# Patient Record
Sex: FEMALE | Race: WHITE | NOT HISPANIC OR LATINO | Employment: OTHER | URBAN - METROPOLITAN AREA
[De-identification: names, ages, dates, MRNs, and addresses within clinical notes are randomized per-mention and may not be internally consistent; named-entity substitution may affect disease eponyms.]

---

## 2017-01-01 ENCOUNTER — HOSPITAL ENCOUNTER (EMERGENCY)
Facility: HOSPITAL | Age: 82
Discharge: HOME | End: 2017-01-01
Attending: SPECIALIST
Payer: MEDICARE

## 2017-01-01 VITALS
TEMPERATURE: 98 F | DIASTOLIC BLOOD PRESSURE: 71 MMHG | WEIGHT: 180 LBS | BODY MASS INDEX: 30.73 KG/M2 | HEART RATE: 70 BPM | HEIGHT: 64 IN | OXYGEN SATURATION: 97 % | RESPIRATION RATE: 20 BRPM | SYSTOLIC BLOOD PRESSURE: 154 MMHG

## 2017-01-01 DIAGNOSIS — R00.2 PALPITATIONS: ICD-10-CM

## 2017-01-01 DIAGNOSIS — R91.8 PULMONARY INFILTRATE IN RIGHT LUNG ON CHEST X-RAY: Primary | ICD-10-CM

## 2017-01-01 LAB
ALBUMIN SERPL BCP-MCNC: 4.1 G/DL
ALP SERPL-CCNC: 61 U/L
ALT SERPL W/O P-5'-P-CCNC: 16 U/L
ANION GAP SERPL CALC-SCNC: 14 MMOL/L
APTT BLDCRRT: 28.2 SEC
AST SERPL-CCNC: 22 U/L
BASOPHILS # BLD AUTO: 0.02 K/UL
BASOPHILS NFR BLD: 0.2 %
BILIRUB SERPL-MCNC: 0.5 MG/DL
BILIRUB UR QL STRIP: NEGATIVE
BNP SERPL-MCNC: 99 PG/ML
BUN SERPL-MCNC: 32 MG/DL
CALCIUM SERPL-MCNC: 9.6 MG/DL
CHLORIDE SERPL-SCNC: 103 MMOL/L
CK SERPL-CCNC: 71 U/L
CLARITY UR: CLEAR
CO2 SERPL-SCNC: 22 MMOL/L
COLOR UR: YELLOW
CREAT SERPL-MCNC: 1.7 MG/DL
D DIMER PPP IA.FEU-MCNC: 0.98 MG/L FEU
DIFFERENTIAL METHOD: ABNORMAL
EOSINOPHIL # BLD AUTO: 0.2 K/UL
EOSINOPHIL NFR BLD: 1.9 %
ERYTHROCYTE [DISTWIDTH] IN BLOOD BY AUTOMATED COUNT: 13 %
EST. GFR  (AFRICAN AMERICAN): 31 ML/MIN/1.73 M^2
EST. GFR  (NON AFRICAN AMERICAN): 27 ML/MIN/1.73 M^2
GLUCOSE SERPL-MCNC: 92 MG/DL
GLUCOSE UR QL STRIP: NEGATIVE
HCT VFR BLD AUTO: 37.5 %
HGB BLD-MCNC: 12.9 G/DL
HGB UR QL STRIP: ABNORMAL
INR PPP: 1
KETONES UR QL STRIP: NEGATIVE
LEUKOCYTE ESTERASE UR QL STRIP: NEGATIVE
LYMPHOCYTES # BLD AUTO: 2.7 K/UL
LYMPHOCYTES NFR BLD: 32.5 %
MAGNESIUM SERPL-MCNC: 2.6 MG/DL
MCH RBC QN AUTO: 32.5 PG
MCHC RBC AUTO-ENTMCNC: 34.4 %
MCV RBC AUTO: 95 FL
MONOCYTES # BLD AUTO: 1 K/UL
MONOCYTES NFR BLD: 12.2 %
NEUTROPHILS # BLD AUTO: 4.4 K/UL
NEUTROPHILS NFR BLD: 53.2 %
NITRITE UR QL STRIP: NEGATIVE
PH UR STRIP: 6 [PH] (ref 5–8)
PLATELET # BLD AUTO: 239 K/UL
PMV BLD AUTO: 9.4 FL
POTASSIUM SERPL-SCNC: 4 MMOL/L
PROT SERPL-MCNC: 7.6 G/DL
PROT UR QL STRIP: NEGATIVE
PROTHROMBIN TIME: 10.6 SEC
RBC # BLD AUTO: 3.97 M/UL
SODIUM SERPL-SCNC: 139 MMOL/L
SP GR UR STRIP: 1.01 (ref 1–1.03)
TROPONIN I SERPL DL<=0.01 NG/ML-MCNC: 0.03 NG/ML
URN SPEC COLLECT METH UR: ABNORMAL
UROBILINOGEN UR STRIP-ACNC: NEGATIVE EU/DL
WBC # BLD AUTO: 8.25 K/UL

## 2017-01-01 PROCEDURE — 82550 ASSAY OF CK (CPK): CPT

## 2017-01-01 PROCEDURE — 85610 PROTHROMBIN TIME: CPT

## 2017-01-01 PROCEDURE — 80053 COMPREHEN METABOLIC PANEL: CPT

## 2017-01-01 PROCEDURE — 93010 ELECTROCARDIOGRAM REPORT: CPT | Mod: ,,, | Performed by: INTERNAL MEDICINE

## 2017-01-01 PROCEDURE — 85379 FIBRIN DEGRADATION QUANT: CPT

## 2017-01-01 PROCEDURE — 96361 HYDRATE IV INFUSION ADD-ON: CPT

## 2017-01-01 PROCEDURE — 81003 URINALYSIS AUTO W/O SCOPE: CPT

## 2017-01-01 PROCEDURE — 83735 ASSAY OF MAGNESIUM: CPT

## 2017-01-01 PROCEDURE — 83880 ASSAY OF NATRIURETIC PEPTIDE: CPT

## 2017-01-01 PROCEDURE — 25000003 PHARM REV CODE 250: Performed by: EMERGENCY MEDICINE

## 2017-01-01 PROCEDURE — 85730 THROMBOPLASTIN TIME PARTIAL: CPT

## 2017-01-01 PROCEDURE — 84484 ASSAY OF TROPONIN QUANT: CPT

## 2017-01-01 PROCEDURE — 87040 BLOOD CULTURE FOR BACTERIA: CPT | Mod: 59

## 2017-01-01 PROCEDURE — 63600175 PHARM REV CODE 636 W HCPCS: Performed by: SPECIALIST

## 2017-01-01 PROCEDURE — 25000003 PHARM REV CODE 250: Performed by: SPECIALIST

## 2017-01-01 PROCEDURE — 85025 COMPLETE CBC W/AUTO DIFF WBC: CPT

## 2017-01-01 PROCEDURE — 99284 EMERGENCY DEPT VISIT MOD MDM: CPT | Mod: 25

## 2017-01-01 PROCEDURE — 96365 THER/PROPH/DIAG IV INF INIT: CPT | Mod: 59

## 2017-01-01 RX ORDER — DOXYCYCLINE 100 MG/1
100 CAPSULE ORAL 2 TIMES DAILY
Qty: 20 CAPSULE | Refills: 0 | Status: SHIPPED | OUTPATIENT
Start: 2017-01-01 | End: 2017-01-09 | Stop reason: SINTOL

## 2017-01-01 RX ORDER — MOXIFLOXACIN HYDROCHLORIDE 400 MG/250ML
400 INJECTION, SOLUTION INTRAVENOUS
Status: DISCONTINUED | OUTPATIENT
Start: 2017-01-01 | End: 2017-01-01 | Stop reason: HOSPADM

## 2017-01-01 RX ORDER — DIPHENHYDRAMINE HCL 25 MG
25 CAPSULE ORAL
Status: COMPLETED | OUTPATIENT
Start: 2017-01-01 | End: 2017-01-01

## 2017-01-01 RX ORDER — DOXYCYCLINE HYCLATE 100 MG
100 TABLET ORAL
Status: COMPLETED | OUTPATIENT
Start: 2017-01-01 | End: 2017-01-01

## 2017-01-01 RX ADMIN — MOXIFLOXACIN HYDROCHLORIDE 400 MG: 400 INJECTION, SOLUTION INTRAVENOUS at 02:01

## 2017-01-01 RX ADMIN — SODIUM CHLORIDE 1000 ML: 0.9 INJECTION, SOLUTION INTRAVENOUS at 12:01

## 2017-01-01 RX ADMIN — DIPHENHYDRAMINE HYDROCHLORIDE 25 MG: 25 CAPSULE ORAL at 03:01

## 2017-01-01 RX ADMIN — DOXYCYCLINE HYCLATE 100 MG: 100 TABLET, COATED ORAL at 05:01

## 2017-01-01 NOTE — ED AVS SNAPSHOT
[facility]  [address] [facility] [address]               Shirley Metz   2017 11:27 AM   ED    Description:  Female : 12/3/1932   Department:  [facility]           Your Care was Coordinated By:     Provider Role From To    Mimi Miner MD Attending Provider 17 1128 17 1610    Wilbert Pate MD Attending Provider 17 1610 --      Reason for Visit     Hypertension           Diagnoses this Visit        Comments    Pulmonary infiltrate in right lung on chest x-ray    -  Primary     Palpitations           ED Disposition     None           To Do List           Follow-up Information     Follow up with Yandy Terrell MD. Call in 1 day.    Specialty:  Family Medicine    Contact information:    [address] [place] [address] [address]  [place] [address] [phone]          Follow up with [facility].    Specialty:  Emergency Medicine    Why:  If symptoms worsen    Contact information:    [id number] [place] [id number] [phone]        Follow up with Pulmonary. Call in 2 days.    Why:  for evaluation of Pulmionary infiltrate       These Medications        Disp Refills Start End    doxycycline (VIBRAMYCIN) 100 MG Cap 20 capsule 0 2017    Take 1 capsule (100 mg total) by mouth 2 (two) times daily. - Oral    Pharmacy: [facility] - [place], [place] - [address] Ph #: [phone]         [facility]     [facility] -  Assistance  Registered nurses in the [facility] provide clinical advisement, health education, appointment booking, and other advisory services.  Call for this free service at [phone].             Medications           Message regarding Medications     Verify the changes and/or additions to your medication regime listed below are the same as discussed with your  clinician today.  If any of these changes or additions are incorrect, please notify your healthcare provider.        START taking these NEW medications        Refills    doxycycline (VIBRAMYCIN) 100 MG Cap 0    Sig: Take 1 capsule (100 mg total) by mouth 2 (two) times daily.    Class: Normal    Route: Oral      These medications were administered today        Dose Freq    sodium chloride 0.9% bolus 1,000 mL 1,000 mL ED 1 Time    Sig: Inject 1,000 mLs into the vein ED 1 Time.    Class: Normal    Route: Intravenous    moxifloxacin 400 mg/250 mL IVPB 400 mg 400 mg Every 24 hours (non-standard times)    Sig: Inject 250 mLs (400 mg total) into the vein every 24 hours.    Class: Normal    Route: Intravenous    diphenhydrAMINE capsule 25 mg 25 mg ED 1 Time    Sig: Take 1 each (25 mg total) by mouth ED 1 Time.    Class: Normal    Route: Oral           Verify that the below list of medications is an accurate representation of the medications you are currently taking.  If none reported, the list may be blank. If incorrect, please contact your healthcare provider. Carry this list with you in case of emergency.           Current Medications     cloNIDine (CATAPRES) 0.1 MG tablet Take 1 tablet (0.1 mg total) by mouth 2 (two) times daily as needed.    diphenhydramine-acetaminophen (TYLENOL PM)  mg Tab Take 1 tablet by mouth nightly as needed.    doxycycline (VIBRAMYCIN) 100 MG Cap Take 1 capsule (100 mg total) by mouth 2 (two) times daily.    losartan-hydrochlorothiazide 50-12.5 mg (HYZAAR) 50-12.5 mg per tablet TAKE ONE TABLET BY MOUTH TWICE A DAY    loteprednol (LOTEMAX) 0.5 % ophthalmic suspension 1 drop once daily. One drop in the left eye and 2 drops in the right eye    metoprolol succinate (TOPROL-XL) 25 MG 24 hr tablet Take 2 tablets (50 mg total) by mouth every evening.    moxifloxacin 400 mg/250 mL IVPB 400 mg Inject 250 mLs (400 mg total) into the vein every 24 hours.           Clinical Reference Information     "       Your Vitals Were     BP Pulse Temp Resp Height Weight    154/71 70 98.4 °F (36.9 °C) (Oral) 20 5' 4" (1.626 m) 81.6 kg (180 lb)    SpO2 BMI             97% 30.9 kg/m2         Allergies as of 1/1/2017        Reactions    Claritin [Loratadine] Other (See Comments)    Double vision/spots    Hydrocodone-acetaminophen     wheezing    Naproxen     wheezing    Amlodipine     Myalgias    Fenofibrate     Causes muscle weakness    Hydralazine Analogues     Muscle tremors/aches    Lasix [Furosemide]     myalgias    Allegra [Fexofenadine] Other (See Comments)    Double vision/spots     Codeine Diarrhea, Other (See Comments)    Loss of balance     Erythromycin Other (See Comments)    Complete weakness/could not walk    Hydrocortisone (Bulk) Other (See Comments)    Only suppository/ caused muscle weakness    Patanol [Olopatadine] Other (See Comments)    Muscle weakness    Prednisone Anxiety    Statins-hmg-coa Reductase Inhibitors Other (See Comments)    Muscle weakness    Xalatan [Latanoprost] Other (See Comments)    Muscle weakness      Immunizations Administered on Date of Encounter - 1/1/2017     None      ED Micro, Lab, POCT     Start Ordered       Status Ordering Provider    01/01/17 1348 01/01/17 1348  Blood culture  Once      In process     01/01/17 1348 01/01/17 1348  Blood culture  Once      In process     01/01/17 1148 01/01/17 1148  CBC auto differential  Once      Final result     01/01/17 1148 01/01/17 1148  Comprehensive metabolic panel  Once      Final result     01/01/17 1148 01/01/17 1148  Magnesium  STAT      Final result     01/01/17 1148 01/01/17 1148  CK  STAT      Final result     01/01/17 1148 01/01/17 1148  Troponin I  STAT      Final result     01/01/17 1148 01/01/17 1148  Urinalysis  Once      Final result     01/01/17 1148 01/01/17 1148  Protime-INR  STAT      Final result     01/01/17 1148 01/01/17 1148  APTT  STAT      Final result     01/01/17 1148 01/01/17 1148  Brain natriuretic peptide  " STAT      Final result     01/01/17 1148 01/01/17 1148  D dimer, quantitative  STAT      Final result       ED Imaging Orders     Start Ordered       Status Ordering Provider    01/01/17 1451 01/01/17 1450  NM Lung Ventilation Perfusion Imaging  1 time imaging      Final result     01/01/17 1344 01/01/17 1343    1 time imaging,   Status:  Canceled      Canceled     01/01/17 1148 01/01/17 1148  X-Ray Chest PA And Lateral  1 time imaging      Final result         Discharge Instructions         Understanding Heart Palpitations    Heart palpitations are a symptom. Its the feeling you have when your heartbeat seems to be racing, pounding, skipping, or fluttering. Heart palpitations are most often felt in the chest. Sometimes, they may also be felt in the neck.  What causes heart palpitations?  In most cases, heart palpitations are caused by:  · Stress or anxiety  · Exercise  · Pregnancy  · Some medicines  · Caffeine  · Nicotine  · Alcohol  · Illegal drugs, such as cocaine  · Health problems, such as anemia or overactive thyroid  In some cases, heart palpitations may be caused by a problem with the heart. Abnormal heart rhythms (arrhythmias) are the main concern. They may need to be managed by you and your healthcare provider or treated right away.  How are heart palpitations treated?  Treatments for heart palpitations depend on the cause. Options may include:  · Managing the things that trigger your heart palpitations. This could mean:  ¨ Learning ways to reduce stress and anxiety  ¨ Avoiding caffeine, nicotine, alcohol, or illegal drugs  ¨ Stopping the use of certain medicines, under your doctors guidance  · Medicines, procedures, or surgery to treat an arrhythmia or other health problem that is causing your symptoms  What are the complications of heart palpitations?  Complications of heart palpitations are rare unless they are caused by a problem such as an arrhythmia. In such cases, complications can  include:  · Fainting  · Heart failure. This problem occurs when the heart is so weak it no longer pumps blood well.  · Blood clots and stroke  · Sudden cardiac arrest. This problem occurs when the heart suddenly stops beating.  When should I call my healthcare provider?  Call your healthcare provider right away if you have any of these:  · Fever of 100.4°F (38°C) or higher, or as directed  · Symptoms that dont get better with treatment, or symptoms that get worse  · New symptoms, such as chest pain, shortness of breath, dizziness, or fainting   © 3754-3623 [employer]. [address] [place] [address]. All rights reserved. This information is not intended as a substitute for professional medical care. Always follow your healthcare professional's instructions.          Discharge References/Attachments     ADULT, PNEUMONIA (ENGLISH)      Your Scheduled Appointments     Jan 04, 2017  9:00 AM CST   Nurse Visit with PRIMARY CARE NURSEKIM - Primary Care ([facility])    [address] [facility] [address]   [phone]            Jan 09, 2017  9:20 AM CST   Established Patient Visit with Yandy Terrell MD   [facility] - Primary Care ([facility]    [address] [facility] [address]   [phone]            Feb 06, 2017 10:30 AM CST   Established Patient Visit with Noemi Chery MD   [facility]ology ([facility]    [address] [facility] [address]   [phone]               [facility]  complies with applicable Federal civil rights laws and does not discriminate on the basis of race, color, national origin, age, disability, or sex.        Language Assistance Services     ATTENTION: Language assistance services are available, free of charge. Please call [phone].      ATENCIÓN: Si habla viviane, tiene a wylie disposición servicios gratuitos de asistencia lingüística. Llame al [phone].     CHÚ Ý: N?u b?n nói Ti?ng Vi?t,  có các d?ch v? h? tr? zia ng? mi?n phí dành cho b?n. G?i s? [phone].

## 2017-01-01 NOTE — DISCHARGE INSTRUCTIONS
Understanding Heart Palpitations    Heart palpitations are a symptom. Its the feeling you have when your heartbeat seems to be racing, pounding, skipping, or fluttering. Heart palpitations are most often felt in the chest. Sometimes, they may also be felt in the neck.  What causes heart palpitations?  In most cases, heart palpitations are caused by:  · Stress or anxiety  · Exercise  · Pregnancy  · Some medicines  · Caffeine  · Nicotine  · Alcohol  · Illegal drugs, such as cocaine  · Health problems, such as anemia or overactive thyroid  In some cases, heart palpitations may be caused by a problem with the heart. Abnormal heart rhythms (arrhythmias) are the main concern. They may need to be managed by you and your healthcare provider or treated right away.  How are heart palpitations treated?  Treatments for heart palpitations depend on the cause. Options may include:  · Managing the things that trigger your heart palpitations. This could mean:  ¨ Learning ways to reduce stress and anxiety  ¨ Avoiding caffeine, nicotine, alcohol, or illegal drugs  ¨ Stopping the use of certain medicines, under your doctors guidance  · Medicines, procedures, or surgery to treat an arrhythmia or other health problem that is causing your symptoms  What are the complications of heart palpitations?  Complications of heart palpitations are rare unless they are caused by a problem such as an arrhythmia. In such cases, complications can include:  · Fainting  · Heart failure. This problem occurs when the heart is so weak it no longer pumps blood well.  · Blood clots and stroke  · Sudden cardiac arrest. This problem occurs when the heart suddenly stops beating.  When should I call my healthcare provider?  Call your healthcare provider right away if you have any of these:  · Fever of 100.4°F (38°C) or higher, or as directed  · Symptoms that dont get better with treatment, or symptoms that get worse  · New symptoms, such as chest pain,  shortness of breath, dizziness, or fainting   © 7903-1517 The "[employer]. [address] [place] [address], [place] [address]. All rights reserved. This information is not intended as a substitute for professional medical care. Always follow your healthcare professional's instructions.

## 2017-01-01 NOTE — ED NOTES
Pt resting quietly in bed without distress. No new complaints. Pt AAOx3. resp E/U. Skin W/D. Bed low and locked. Will cont to monitor.

## 2017-01-01 NOTE — ED NOTES
Assumed care of pt at this time. Pt resting in ER stretcher, aaox4, rr e/u, NAD noted. Pt called to nurse's station c/o redness and itching to L arm proximal with to site of avelox infusion. Infusion stopped at this time, Dr. Miner notified and new orders placed. Pt denies SOB. Bed low and locked, call light in reach, side rails up x2. Cardiac monitor and fall precautions in place. Vss noted. Pt verbalized understanding of status and POC; denies further needs, will continue to monitor.

## 2017-01-01 NOTE — ED NOTES
Pt c/o of tachycardia and hypertension on and off since Christmas. Pt seen at Clinic on Monday and was told to follow up with PCP. Pt AAOX3. Resp E/U. Skin W/D. Will cont to monitor.

## 2017-01-01 NOTE — ED NOTES
Patient placed on continuous cardiac monitor, automatic blood pressure cuff and continuous pulse oximeter.

## 2017-01-01 NOTE — ED PROVIDER NOTES
SCRIBE #1 NOTE: I, Vijaya Jacobson, am scribing for, and in the presence of, Mimi Miner MD. I have scribed the HPI, ROS, and PEx.     SCRIBE #2 NOTE: I, Lacie Garcia, am scribing for, and in the presence of,  Wilbert Pate MD. I have scribed the remaining portions of the note not scribed by Scribe #1.     History      Chief Complaint   Patient presents with    Hypertension     my BP and pulse have been going up and down, seen at clinic monday       Review of patient's allergies indicates:   Allergen Reactions    Claritin [loratadine] Other (See Comments)     Double vision/spots    Hydrocodone-acetaminophen      wheezing    Naproxen      wheezing    Amlodipine      Myalgias      Fenofibrate      Causes muscle weakness    Hydralazine analogues      Muscle tremors/aches      Lasix [furosemide]      myalgias    Allegra [fexofenadine] Other (See Comments)     Double vision/spots     Codeine Diarrhea and Other (See Comments)     Loss of balance     Erythromycin Other (See Comments)     Complete weakness/could not walk    Hydrocortisone (bulk) Other (See Comments)     Only suppository/ caused muscle weakness    Patanol [olopatadine] Other (See Comments)     Muscle weakness    Prednisone Anxiety    Statins-hmg-coa reductase inhibitors Other (See Comments)     Muscle weakness    Xalatan [latanoprost] Other (See Comments)     Muscle weakness        HPI   HPI    1/1/2017, 11:31 AM   History obtained from the patient      History of Present Illness: Shirley Metz is a 84 y.o. female patient who presents to the Emergency Department for palpitations which onset gradually 1 week ago. Sxs are episodic, described as a fast heart beat, and moderate in severity. Pt was seen in clinic on Monday for these sxs. There are no  mitigating or exacerbating factors noted. Associated sxs include fluctuating blood pressure and SOB.  Pt denies any fever, N/V/D, CP, chest tightness, calf pain, cough,  and all other sxs at this time. No further complaints or concerns at this time.     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 Quintanilla    Eye surgery       Cataract wIOL    Breast cyst excision Left 1997 approx    Tonsillectomy  1938    Adenoidectomy  1938    Cholecystectomy  1975         Family History:  Family History   Problem Relation Age of Onset    Heart disease Mother     Hypertension Mother     Cancer Father 88     sarcoma( ?Kaposi's ) on leg    Deep vein thrombosis Neg Hx     Ovarian cancer Neg Hx     Breast cancer Neg Hx     Kidney disease Neg Hx     Strabismus Neg Hx     Retinal detachment Neg Hx     Macular degeneration Neg Hx     Glaucoma Neg Hx     Blindness Neg Hx     Amblyopia Neg Hx     Eczema Neg Hx     Lupus Neg Hx     Melanoma Neg Hx     Psoriasis Neg Hx     Diabetes Neg Hx     Stroke Neg Hx     Mental retardation Neg Hx     Mental illness Neg Hx     Hyperlipidemia Neg Hx     COPD Neg Hx     Asthma Neg Hx     Depression Neg Hx     Alcohol abuse Neg Hx     Drug abuse Neg Hx        Social History:  Social History     Social History Main Topics    Smoking status: Never Smoker    Smokeless tobacco: Never Used      Comment: history of passive smoking  from her ex-    Alcohol use 1.2 oz/week     2 Standard drinks or equivalent per week      Comment: 2 scotch drinks a week.     Drug use: No    Sexual activity: Yes     Partners: Male      Comment: discussed protection for STD's       ROS   Review of Systems   Constitutional: Negative for fever.   HENT: Negative for sore throat.    Respiratory: Negative for cough, chest tightness and shortness of breath.    Cardiovascular: Positive for palpitations. Negative for chest pain.   Gastrointestinal: Negative for diarrhea, nausea and vomiting.   Genitourinary: Negative for dysuria.   Musculoskeletal: Negative for back pain.        (-) calf pain   Skin: Negative for rash.   Neurological: Negative for weakness.   Hematological: Does not bruise/bleed easily.       Physical Exam    Initial Vitals   BP Pulse Resp Temp SpO2   01/01/17 1125 01/01/17 1125 01/01/17 1125 01/01/17 1125 01/01/17 1125   146/69 69 18 98.4 °F (36.9 °C) 93 %      Physical Exam  Nursing Notes and Vital Signs Reviewed.  Constitutional: Patient is in no acute distress. Awake and alert. Well-developed and well-nourished.  Head: Atraumatic. Normocephalic.  Eyes: PERRL. EOM intact. Conjunctivae are not pale. No scleral icterus.  ENT: Mucous membranes are moist. Oropharynx is clear and symmetric.    Neck: Supple. Full ROM. No lymphadenopathy.  Cardiovascular: Regular rate. Regular rhythm. No murmurs, rubs, or gallops. Distal pulses are 2+ and symmetric.  Pulmonary/Chest: No respiratory distress. Clear to auscultation bilaterally. No wheezing, rales, or rhonchi.  Abdominal: Soft and non-distended.  There is no tenderness.  No rebound, guarding, or rigidity.    Genitourinary: No CVA tenderness  Musculoskeletal: Moves all extremities. No obvious deformities. No edema. No calf tenderness.  Skin: Warm and dry.  Neurological:  Alert, awake, and appropriate.  Normal speech.  No acute focal neurological deficits are appreciated.  Psychiatric: Normal affect.  "Good eye contact. Appropriate in content.    ED Course    Procedures  ED Vital Signs:  Vitals:    01/01/17 1125 01/01/17 1223 01/01/17 1403 01/01/17 1440   BP: (!) 146/69 131/83 (!) 161/59 (!) 160/64   Pulse: 69 69 73 75   Resp: 18 16 (!) 21 (!) 21   Temp: 98.4 °F (36.9 °C)      TempSrc: Oral      SpO2: (!) 93% 97% 98% 98%   Weight: 81.6 kg (180 lb)      Height: 5' 4" (1.626 m)       01/01/17 1502   BP: (!) 154/71   Pulse: 70   Resp: 20   Temp:    TempSrc:    SpO2: 97%   Weight:    Height:        Abnormal Lab Results:  Labs Reviewed   CBC W/ AUTO DIFFERENTIAL - Abnormal; Notable for the following:        Result Value    RBC 3.97 (*)     MCH 32.5 (*)     All other components within normal limits   COMPREHENSIVE METABOLIC PANEL - Abnormal; Notable for the following:     CO2 22 (*)     BUN, Bld 32 (*)     Creatinine 1.7 (*)     eGFR if  31 (*)     eGFR if non  27 (*)     All other components within normal limits   URINALYSIS - Abnormal; Notable for the following:     Occult Blood UA Trace (*)     All other components within normal limits   D DIMER, QUANTITATIVE - Abnormal; Notable for the following:     D-Dimer 0.98 (*)     All other components within normal limits   CULTURE, BLOOD   CULTURE, BLOOD   MAGNESIUM   CK   TROPONIN I   PROTIME-INR   APTT   B-TYPE NATRIURETIC PEPTIDE        All Lab Results:  Results for orders placed or performed during the hospital encounter of 01/01/17   CBC auto differential   Result Value Ref Range    WBC 8.25 3.90 - 12.70 K/uL    RBC 3.97 (L) 4.00 - 5.40 M/uL    Hemoglobin 12.9 12.0 - 16.0 g/dL    Hematocrit 37.5 37.0 - 48.5 %    MCV 95 82 - 98 fL    MCH 32.5 (H) 27.0 - 31.0 pg    MCHC 34.4 32.0 - 36.0 %    RDW 13.0 11.5 - 14.5 %    Platelets 239 150 - 350 K/uL    MPV 9.4 9.2 - 12.9 fL    Gran # 4.4 1.8 - 7.7 K/uL    Lymph # 2.7 1.0 - 4.8 K/uL    Mono # 1.0 0.3 - 1.0 K/uL    Eos # 0.2 0.0 - 0.5 K/uL    Baso # 0.02 0.00 - 0.20 K/uL    Gran% 53.2 38.0 - 73.0 " %    Lymph% 32.5 18.0 - 48.0 %    Mono% 12.2 4.0 - 15.0 %    Eosinophil% 1.9 0.0 - 8.0 %    Basophil% 0.2 0.0 - 1.9 %    Differential Method Automated    Comprehensive metabolic panel   Result Value Ref Range    Sodium 139 136 - 145 mmol/L    Potassium 4.0 3.5 - 5.1 mmol/L    Chloride 103 95 - 110 mmol/L    CO2 22 (L) 23 - 29 mmol/L    Glucose 92 70 - 110 mg/dL    BUN, Bld 32 (H) 8 - 23 mg/dL    Creatinine 1.7 (H) 0.5 - 1.4 mg/dL    Calcium 9.6 8.7 - 10.5 mg/dL    Total Protein 7.6 6.0 - 8.4 g/dL    Albumin 4.1 3.5 - 5.2 g/dL    Total Bilirubin 0.5 0.1 - 1.0 mg/dL    Alkaline Phosphatase 61 55 - 135 U/L    AST 22 10 - 40 U/L    ALT 16 10 - 44 U/L    Anion Gap 14 8 - 16 mmol/L    eGFR if African American 31 (A) >60 mL/min/1.73 m^2    eGFR if non African American 27 (A) >60 mL/min/1.73 m^2   Magnesium   Result Value Ref Range    Magnesium 2.6 1.6 - 2.6 mg/dL   CK   Result Value Ref Range    CPK 71 20 - 180 U/L   Troponin I   Result Value Ref Range    Troponin I 0.025 0.000 - 0.026 ng/mL   Urinalysis   Result Value Ref Range    Specimen UA Urine, Clean Catch     Color, UA Yellow Yellow, Straw, Pallavi    Appearance, UA Clear Clear    pH, UA 6.0 5.0 - 8.0    Specific Gravity, UA 1.010 1.005 - 1.030    Protein, UA Negative Negative    Glucose, UA Negative Negative    Ketones, UA Negative Negative    Bilirubin (UA) Negative Negative    Occult Blood UA Trace (A) Negative    Nitrite, UA Negative Negative    Urobilinogen, UA Negative <2.0 EU/dL    Leukocytes, UA Negative Negative   Protime-INR   Result Value Ref Range    Prothrombin Time 10.6 9.0 - 12.5 sec    INR 1.0 0.8 - 1.2   APTT   Result Value Ref Range    aPTT 28.2 21.0 - 32.0 sec   Brain natriuretic peptide   Result Value Ref Range    BNP 99 0 - 99 pg/mL   D dimer, quantitative   Result Value Ref Range    D-Dimer 0.98 (H) <0.50 mg/L FEU         Imaging Results:  Imaging Results         NM Lung Ventilation Perfusion Imaging (Final result) Result time:  01/01/17 16:19:41     Final result by Bere Henson III, MD (01/01/17 16:19:41)    Impression:           Low probability for pulmonary embolism.        Electronically signed by: BERE HENSON MD  Date:     01/01/17  Time:    16:19     Narrative:    NM LUNG VENTILATION AND PERFUSION IMAGING    Clinical History: Shortness of breath.    Findings:   The patient received 1 mCi of technetium 99 DTPA for ventilation images and 6 mCi of technetium 99 MAA for perfusion images.  There is a small fixed V/Q defect in the lateral right lower lobe seen on the posterior view.  There are no mismatched segmental or lobar perfusion defects to suggest PE.            X-Ray Chest PA And Lateral (Final result) Result time:  01/01/17 13:06:46    Final result by Amish Montemayor MD (01/01/17 13:06:46)    Impression:         Questionable infiltrate in medial right lung base.  New since 2 19,016.      Electronically signed by: AMISH MONTEMAYOR MD  Date:     01/01/17  Time:    13:06     Narrative:    Exam: XR CHEST PA AND LATERAL,    Date:  01/01/17 12:51:46    History: Hypertension.    Comparison:  02/09/2016        Findings:     Cardiac silhouette is enlarged.  Increased density medial right lung base could represent infiltrate or artifact due to a superimposition of densities.  No significant bony findings               The EKG was ordered, reviewed, and independently interpreted by the ED provider.  Interpretation time: 11:54 AM  Rate: 63 BPM  Rhythm: normal sinus rhythm  Interpretation: No STEMI.           The Emergency Provider reviewed the vital signs and test results, which are outlined above.    ED Discussion     1:45 PM: Re-evaluated pt. Pt is resting comfortably. D/w pt all pertinent results. D/w pt any concerns expressed at this time. Answered all questions. Pt expresses understanding at this time.    4:00 PM: Dr. Miner transfers care of pt to Dr. Pate, pending imaging and lab results.    4:40 PM: Reassessed pt at this time. Will treat  patient with Doxycycline antibiotics. Instructed pt to f/u with pulmonary for evaluation of abnormal chest X-ray.  Discussed with pt all pertinent ED information and results. Discussed pt dx and plan of tx. Gave pt all f/u and return to the ED instructions. All questions and concerns were addressed at this time. Pt expresses understanding of information and instructions, and is comfortable with plan to discharge. Pt is stable for discharge.    I discussed with patient and/or family/caretaker that evaluation in the ED does not suggest any emergent or life threatening medical conditions requiring immediate intervention beyond what was provided in the ED, and I believe patient is safe for discharge.  Regardless, an unremarkable evaluation in the ED does not preclude the development or presence of a serious of life threatening condition. As such, patient was instructed to return immediately for any worsening or change in current symptoms.      ED Medication(s):  Medications   moxifloxacin 400 mg/250 mL IVPB 400 mg (0 mg Intravenous Stopped 1/1/17 1512)   sodium chloride 0.9% bolus 1,000 mL (0 mLs Intravenous Stopped 1/1/17 1430)   diphenhydrAMINE capsule 25 mg (25 mg Oral Given 1/1/17 1520)       New Prescriptions    DOXYCYCLINE (VIBRAMYCIN) 100 MG CAP    Take 1 capsule (100 mg total) by mouth 2 (two) times daily.       Follow-up Information     Follow up with Yandy Terrell MD. Call in 1 day.    Specialty:  Family Medicine    Contact information:    [address] [place] [address] [phone]          Follow up with [facility] - .    Specialty:  Emergency Medicine    Why:  If symptoms worsen    Contact information:    [id number] [place] [address] [phone]        Follow up with Pulmonary. Call in 2 days.    Why:  for evaluation of Pulmionary infiltrate          Pre-hypertension/Hypertension: The pt has been informed that they may have pre-hypertension or  hypertension based on a blood pressure reading in the ED. I recommend that the pt call the PCP listed on their discharge instructions or a physician of their choice this week to arrange f/u for further evaluation of possible pre-hypertension or hypertension.     Medical Decision Making    Medical Decision Making:   Clinical Tests:   Lab Tests: Ordered and Reviewed  Radiological Study: Ordered and Reviewed  Medical Tests: Ordered and Reviewed           Scribe Attestation:   Scribe #1: I performed the above scribed service and the documentation accurately describes the services I performed. I attest to the accuracy of the note.    Attending:   Physician Attestation Statement for Scribe #1: I, Mimi Miner MD, personally performed the services described in this documentation, as scribed by Vijaya Jacobson, in my presence, and it is both accurate and complete.       Scribe Attestation:   Scribe #2: I performed the above scribed service and the documentation accurately describes the services I performed. I attest to the accuracy of the note.    Attending Attestation:           Physician Attestation for Scribe:    Physician Attestation Statement for Scribe #2: I, Wilbert Pate MD, reviewed documentation, as scribed by Lacie Garcia in my presence, and it is both accurate and complete. I also acknowledge and confirm the content of the note done by Drakeibfavian #1.          Clinical Impression       ICD-10-CM ICD-9-CM   1. Pulmonary infiltrate in right lung on chest x-ray R91.8 793.19   2. Palpitations R00.2 785.1       Disposition:   Disposition: Discharged  Condition: Stable         Wilbert Pate MD  01/01/17 1098

## 2017-01-01 NOTE — ED NOTES
Pt ambulatory to restroom and returned to ER stretcher without difficulty. Pt resting in ER stretcher, aaox4, rr e/u, NAD noted. Pt remains on cardiac monitor with vss noted. Bed low and locked, call light in reach, side rails up x2. Pt verbalized understanding of status and POC; denies further needs. Will continue to monitor.

## 2017-01-04 ENCOUNTER — CLINICAL SUPPORT (OUTPATIENT)
Dept: INTERNAL MEDICINE | Facility: CLINIC | Age: 82
End: 2017-01-04
Payer: MEDICARE

## 2017-01-04 ENCOUNTER — TELEPHONE (OUTPATIENT)
Dept: CARDIOLOGY | Facility: CLINIC | Age: 82
End: 2017-01-04

## 2017-01-04 ENCOUNTER — TELEPHONE (OUTPATIENT)
Dept: INTERNAL MEDICINE | Facility: CLINIC | Age: 82
End: 2017-01-04

## 2017-01-04 DIAGNOSIS — R00.2 PALPITATIONS: ICD-10-CM

## 2017-01-04 DIAGNOSIS — E78.2 MIXED HYPERLIPIDEMIA: ICD-10-CM

## 2017-01-04 DIAGNOSIS — I10 ESSENTIAL HYPERTENSION: ICD-10-CM

## 2017-01-04 DIAGNOSIS — R00.2 PALPITATIONS: Primary | ICD-10-CM

## 2017-01-04 DIAGNOSIS — I49.3 PVC'S (PREMATURE VENTRICULAR CONTRACTIONS): ICD-10-CM

## 2017-01-04 LAB
ALBUMIN SERPL BCP-MCNC: 4.2 G/DL
ALP SERPL-CCNC: 62 U/L
ALT SERPL W/O P-5'-P-CCNC: 17 U/L
ANION GAP SERPL CALC-SCNC: 10 MMOL/L
AST SERPL-CCNC: 24 U/L
BASOPHILS # BLD AUTO: 0.03 K/UL
BASOPHILS NFR BLD: 0.5 %
BILIRUB SERPL-MCNC: 0.6 MG/DL
BUN SERPL-MCNC: 26 MG/DL
CALCIUM SERPL-MCNC: 10 MG/DL
CHLORIDE SERPL-SCNC: 105 MMOL/L
CHOLEST/HDLC SERPL: 5.9 {RATIO}
CO2 SERPL-SCNC: 25 MMOL/L
CREAT SERPL-MCNC: 1.5 MG/DL
DIFFERENTIAL METHOD: ABNORMAL
EOSINOPHIL # BLD AUTO: 0.1 K/UL
EOSINOPHIL NFR BLD: 1.8 %
ERYTHROCYTE [DISTWIDTH] IN BLOOD BY AUTOMATED COUNT: 12.9 %
EST. GFR  (AFRICAN AMERICAN): 36.6 ML/MIN/1.73 M^2
EST. GFR  (NON AFRICAN AMERICAN): 31.8 ML/MIN/1.73 M^2
GLUCOSE SERPL-MCNC: 104 MG/DL
HCT VFR BLD AUTO: 37.7 %
HDL/CHOLESTEROL RATIO: 17.1 %
HDLC SERPL-MCNC: 258 MG/DL
HDLC SERPL-MCNC: 44 MG/DL
HGB BLD-MCNC: 12.8 G/DL
LDLC SERPL CALC-MCNC: 177.2 MG/DL
LYMPHOCYTES # BLD AUTO: 1.9 K/UL
LYMPHOCYTES NFR BLD: 28.5 %
MCH RBC QN AUTO: 32.8 PG
MCHC RBC AUTO-ENTMCNC: 34 %
MCV RBC AUTO: 97 FL
MONOCYTES # BLD AUTO: 0.6 K/UL
MONOCYTES NFR BLD: 9.4 %
NEUTROPHILS # BLD AUTO: 3.9 K/UL
NEUTROPHILS NFR BLD: 59.5 %
NONHDLC SERPL-MCNC: 214 MG/DL
PLATELET # BLD AUTO: 255 K/UL
PMV BLD AUTO: 10.1 FL
POTASSIUM SERPL-SCNC: 4.2 MMOL/L
PROT SERPL-MCNC: 7.8 G/DL
RBC # BLD AUTO: 3.9 M/UL
SODIUM SERPL-SCNC: 140 MMOL/L
TRIGL SERPL-MCNC: 184 MG/DL
TSH SERPL DL<=0.005 MIU/L-ACNC: 1.87 UIU/ML
WBC # BLD AUTO: 6.49 K/UL

## 2017-01-04 PROCEDURE — 99999 PR PBB SHADOW E&M-EST. PATIENT-LVL I: CPT | Mod: PBBFAC,,,

## 2017-01-04 PROCEDURE — 84443 ASSAY THYROID STIM HORMONE: CPT

## 2017-01-04 PROCEDURE — 80053 COMPREHEN METABOLIC PANEL: CPT

## 2017-01-04 PROCEDURE — 80061 LIPID PANEL: CPT

## 2017-01-04 PROCEDURE — 36415 COLL VENOUS BLD VENIPUNCTURE: CPT | Mod: S$GLB,,, | Performed by: FAMILY MEDICINE

## 2017-01-04 PROCEDURE — 85025 COMPLETE CBC W/AUTO DIFF WBC: CPT

## 2017-01-04 NOTE — TELEPHONE ENCOUNTER
Pt called and left VM on my machine for me to call her back  She says over maya her pulse was up over 100bpm  She says she saw urgent care and they did ekg and it didn't show anything  She says it was good all the next week then once the weekend came , her pulse was up again so she went to the ER.   She says she has been having dry mouth and out of breath.   They did labs and x rays and she had some questionable pneumonia.   They treated her for this but she says she is still having her heart race from time to time  She says it has happened twice this week.   I offered pt appt with NP this week and she declined  She says she is seeing pulmonary on Friday of this week then her PCP on Monday 1/9  Pt has scheduled with Arvind Metz NP-C on 1/13 as of now   She says she will call and move appt up if needed.

## 2017-01-04 NOTE — TELEPHONE ENCOUNTER
The patient came in today and had labs done,  Would you like for us to do the TSH that was ordered on 11/28/16???

## 2017-01-05 ENCOUNTER — TELEPHONE (OUTPATIENT)
Dept: CARDIOLOGY | Facility: HOSPITAL | Age: 82
End: 2017-01-05

## 2017-01-06 LAB
BACTERIA BLD CULT: NORMAL
BACTERIA BLD CULT: NORMAL

## 2017-01-09 ENCOUNTER — APPOINTMENT (OUTPATIENT)
Dept: RADIOLOGY | Facility: HOSPITAL | Age: 82
End: 2017-01-09
Attending: FAMILY MEDICINE
Payer: MEDICARE

## 2017-01-09 ENCOUNTER — OFFICE VISIT (OUTPATIENT)
Dept: INTERNAL MEDICINE | Facility: CLINIC | Age: 82
End: 2017-01-09
Payer: MEDICARE

## 2017-01-09 VITALS
BODY MASS INDEX: 31.22 KG/M2 | OXYGEN SATURATION: 95 % | SYSTOLIC BLOOD PRESSURE: 144 MMHG | DIASTOLIC BLOOD PRESSURE: 64 MMHG | HEIGHT: 64 IN | TEMPERATURE: 98 F | HEART RATE: 64 BPM | WEIGHT: 182.88 LBS

## 2017-01-09 DIAGNOSIS — I70.0 CALCIFICATION OF AORTA: ICD-10-CM

## 2017-01-09 DIAGNOSIS — I10 ESSENTIAL HYPERTENSION: ICD-10-CM

## 2017-01-09 DIAGNOSIS — J18.9 PNEUMONIA OF RIGHT LOWER LOBE DUE TO INFECTIOUS ORGANISM: Primary | ICD-10-CM

## 2017-01-09 DIAGNOSIS — J18.9 PNEUMONIA OF RIGHT LOWER LOBE DUE TO INFECTIOUS ORGANISM: ICD-10-CM

## 2017-01-09 DIAGNOSIS — N18.30 CKD (CHRONIC KIDNEY DISEASE) STAGE 3, GFR 30-59 ML/MIN: ICD-10-CM

## 2017-01-09 PROCEDURE — 3077F SYST BP >= 140 MM HG: CPT | Mod: S$GLB,,, | Performed by: FAMILY MEDICINE

## 2017-01-09 PROCEDURE — 71020 XR CHEST PA AND LATERAL: CPT | Mod: TC,PO

## 2017-01-09 PROCEDURE — 71020 XR CHEST PA AND LATERAL: CPT | Mod: 26,,, | Performed by: RADIOLOGY

## 2017-01-09 PROCEDURE — 99999 PR PBB SHADOW E&M-EST. PATIENT-LVL III: CPT | Mod: PBBFAC,,, | Performed by: FAMILY MEDICINE

## 2017-01-09 PROCEDURE — 3078F DIAST BP <80 MM HG: CPT | Mod: S$GLB,,, | Performed by: FAMILY MEDICINE

## 2017-01-09 PROCEDURE — 99499 UNLISTED E&M SERVICE: CPT | Mod: S$GLB,,, | Performed by: FAMILY MEDICINE

## 2017-01-09 PROCEDURE — 1159F MED LIST DOCD IN RCRD: CPT | Mod: S$GLB,,, | Performed by: FAMILY MEDICINE

## 2017-01-09 PROCEDURE — 1160F RVW MEDS BY RX/DR IN RCRD: CPT | Mod: S$GLB,,, | Performed by: FAMILY MEDICINE

## 2017-01-09 PROCEDURE — 1157F ADVNC CARE PLAN IN RCRD: CPT | Mod: S$GLB,,, | Performed by: FAMILY MEDICINE

## 2017-01-09 PROCEDURE — 1125F AMNT PAIN NOTED PAIN PRSNT: CPT | Mod: S$GLB,,, | Performed by: FAMILY MEDICINE

## 2017-01-09 PROCEDURE — 99213 OFFICE O/P EST LOW 20 MIN: CPT | Mod: S$GLB,,, | Performed by: FAMILY MEDICINE

## 2017-01-09 NOTE — PROGRESS NOTES
Subjective:       Patient ID: Shirley Metz is a 84 y.o. female.    Chief Complaint: discuss lab    HPI Comments: She was seen at ED 1/2/17 for SOB, HR elevation to 105. The CXR showed infiltrate. She had allergic rxn to moxifloxacin in the ED.     She had problems with doxy - had nausea, weakness, SOB. She stopped after 3 doses and felt her symptoms were better. After 36 hours she tried again but still felt extremely weak. She stopped again after 3 doses. She sts she has problems with many meds, usually involving weakness and muscle aches.    Review of Systems   Constitutional: Negative for fever.   Respiratory: Positive for shortness of breath. Negative for cough.    Cardiovascular: Negative for chest pain, palpitations and leg swelling.       Objective:      Physical Exam   Constitutional: She is oriented to person, place, and time. She appears well-developed and well-nourished.   HENT:   Head: Normocephalic and atraumatic.   Right Ear: Tympanic membrane, external ear and ear canal normal.   Left Ear: Tympanic membrane, external ear and ear canal normal.   Nose: Nose normal.   Mouth/Throat: Oropharynx is clear and moist. No oropharyngeal exudate.   Eyes: Conjunctivae and EOM are normal.   Neck: Neck supple. No thyromegaly present.   Cardiovascular: Normal rate, regular rhythm and normal heart sounds.    Pulmonary/Chest: Effort normal and breath sounds normal.   Lymphadenopathy:     She has no cervical adenopathy.   Neurological: She is alert and oriented to person, place, and time.   Skin: Skin is warm and dry.   Psychiatric: She has a normal mood and affect. Her behavior is normal.       Assessment:       1. Pneumonia of right lower lobe due to infectious organism    2. Calcification of aorta    3. Essential hypertension    4. CKD (chronic kidney disease) stage 3, GFR 30-59 ml/min        Plan:     1. Pneumonia of right lower lobe due to infectious organism  X-Ray Chest PA And Lateral   2.  Calcification of aorta     3. Essential hypertension  Basic metabolic panel   4. CKD (chronic kidney disease) stage 3, GFR 30-59 ml/min  Basic metabolic panel     Can rx bactrim if infiltrate present.

## 2017-01-09 NOTE — MR AVS SNAPSHOT
[address]  [place] [address]  Phone: [phone]  Fax: [phone]                  Shirley Bettencourt Isaías   2017 9:20 AM   Office Visit    Description:  Female : 12/3/1932   Provider:  Yandy Terrell MD   Department:  [facility]           Reason for Visit     discuss lab           Diagnoses this Visit        Comments    Pneumonia of right lower lobe due to infectious organism    -  Primary     Calcification of aorta         Essential hypertension         CKD (chronic kidney disease) stage 3, GFR 30-59 ml/min                To Do List           Future Appointments        Provider Department Dept Phone    2017 3:20 PM Rachel Nava NP [facility] Pulmonary Services [phone]    2017 9:00 AM [facility] - Cardiology [phone]    2017 10:30 AM Noemi Chery MD [facility] Rheumatology [phone]    2017 8:40 AM PRIMARY CARE NURSEKIM [facility] [phone]    4/10/2017 9:00 AM Yandy Terrell MD [facility] [phone]      Goals (5 Years of Data)     None      Follow-Up and Disposition     Return in about 3 months (around 2017), or if symptoms worsen or fail to improve.      [facility] On [facility]     [facility] On Call Nurse [facility] -  Assistance  Registered nurses in the [facility] provide clinical advisement, health education, appointment booking, and other advisory services.  Call for this free service at [phone].             Medications           Message regarding Medications     Verify the changes and/or additions to your medication regime listed below are the same as discussed with your clinician today.  If any of these changes or additions are incorrect, please notify your healthcare provider.        STOP taking these medications     doxycycline (VIBRAMYCIN) 100 MG Cap Take 1 capsule (100 mg total) by mouth 2 (two) times daily.     "       Verify that the below list of medications is an accurate representation of the medications you are currently taking.  If none reported, the list may be blank. If incorrect, please contact your healthcare provider. Carry this list with you in case of emergency.           Current Medications     cloNIDine (CATAPRES) 0.1 MG tablet Take 1 tablet (0.1 mg total) by mouth 2 (two) times daily as needed.    diphenhydramine-acetaminophen (TYLENOL PM)  mg Tab Take 1 tablet by mouth nightly as needed.    losartan-hydrochlorothiazide 50-12.5 mg (HYZAAR) 50-12.5 mg per tablet TAKE ONE TABLET BY MOUTH TWICE A DAY    loteprednol (LOTEMAX) 0.5 % ophthalmic suspension 1 drop once daily. One drop in the left eye and 1 drops in the right eye    metoprolol succinate (TOPROL-XL) 25 MG 24 hr tablet Take 2 tablets (50 mg total) by mouth every evening.           Clinical Reference Information           Vital Signs - Last Recorded  Most recent update: 1/9/2017  9:23 AM by Argentina Pruitt LPN    BP Pulse Temp Ht Wt SpO2    (!) 144/64 (BP Location: Right arm, Patient Position: Sitting, BP Method: Automatic) 64 98.1 °F (36.7 °C) (Oral) 5' 4" (1.626 m) 83 kg (182 lb 14 oz) 95%    BMI                31.39 kg/m2          Blood Pressure          Most Recent Value    BP  (!)  144/64      Allergies as of 1/9/2017     Claritin [Loratadine]    Hydrocodone-acetaminophen    Naproxen    Vibramycin [Doxycycline Calcium]    Amlodipine    Fenofibrate    Hydralazine Analogues    Isosorbide    Lasix [Furosemide]    Moxifloxacin    Timolol    Allegra [Fexofenadine]    Codeine    Erythromycin    Hydrocortisone (Bulk)    Macrolide Antibiotics    Patanol [Olopatadine]    Prednisone    Statins-hmg-coa Reductase Inhibitors    Xalatan [Latanoprost]      Immunizations Administered on Date of Encounter - 1/9/2017     None      Orders Placed During Today's Visit     Future Labs/Procedures Expected by Expires    X-Ray Chest PA And Lateral  1/9/2017 " 1/9/2018    Basic metabolic panel  4/9/2017 3/10/2018

## 2017-01-13 ENCOUNTER — OFFICE VISIT (OUTPATIENT)
Dept: CARDIOLOGY | Facility: CLINIC | Age: 82
End: 2017-01-13
Payer: MEDICARE

## 2017-01-13 VITALS
DIASTOLIC BLOOD PRESSURE: 52 MMHG | SYSTOLIC BLOOD PRESSURE: 116 MMHG | HEART RATE: 64 BPM | BODY MASS INDEX: 31.17 KG/M2 | WEIGHT: 182.56 LBS | HEIGHT: 64 IN

## 2017-01-13 DIAGNOSIS — I11.9 LVH (LEFT VENTRICULAR HYPERTROPHY) DUE TO HYPERTENSIVE DISEASE, WITHOUT HEART FAILURE: Chronic | ICD-10-CM

## 2017-01-13 DIAGNOSIS — I10 ESSENTIAL HYPERTENSION: Primary | Chronic | ICD-10-CM

## 2017-01-13 DIAGNOSIS — R00.2 PALPITATIONS: ICD-10-CM

## 2017-01-13 PROCEDURE — 1126F AMNT PAIN NOTED NONE PRSNT: CPT | Mod: S$GLB,,, | Performed by: NURSE PRACTITIONER

## 2017-01-13 PROCEDURE — 99214 OFFICE O/P EST MOD 30 MIN: CPT | Mod: S$GLB,,, | Performed by: NURSE PRACTITIONER

## 2017-01-13 PROCEDURE — 1159F MED LIST DOCD IN RCRD: CPT | Mod: S$GLB,,, | Performed by: NURSE PRACTITIONER

## 2017-01-13 PROCEDURE — 1160F RVW MEDS BY RX/DR IN RCRD: CPT | Mod: S$GLB,,, | Performed by: NURSE PRACTITIONER

## 2017-01-13 PROCEDURE — 99999 PR PBB SHADOW E&M-EST. PATIENT-LVL III: CPT | Mod: PBBFAC,,, | Performed by: NURSE PRACTITIONER

## 2017-01-13 PROCEDURE — 99499 UNLISTED E&M SERVICE: CPT | Mod: S$GLB,,, | Performed by: NURSE PRACTITIONER

## 2017-01-13 PROCEDURE — 3074F SYST BP LT 130 MM HG: CPT | Mod: S$GLB,,, | Performed by: NURSE PRACTITIONER

## 2017-01-13 PROCEDURE — 3078F DIAST BP <80 MM HG: CPT | Mod: S$GLB,,, | Performed by: NURSE PRACTITIONER

## 2017-01-13 PROCEDURE — 1157F ADVNC CARE PLAN IN RCRD: CPT | Mod: S$GLB,,, | Performed by: NURSE PRACTITIONER

## 2017-01-13 NOTE — MR AVS SNAPSHOT
[facility]  [address] [facility] [address]  Phone: [phone]  Fax: [phone]                  Shirley Metz   2017 9:00 AM   Office Visit    Description:  Female : 12/3/1932   Provider:  FABIO Estrella   Department:  [facility]           Reason for Visit     Hypertension           Diagnoses this Visit        Comments    Essential hypertension    -  Primary     Palpitations         LVH (left ventricular hypertrophy) due to hypertensive disease, without heart failure                To Do List           Future Appointments        Provider Department Dept Phone    2017 10:30 AM Noemi Chery MD [facility] - Rheumatology [phone]    2017 8:00 AM PRIMARY CARE NURSE, [facility] [phone]    4/10/2017 9:00 AM Yandy Terrell MD [facility] [phone]      Goals (5 Years of Data)     None      Follow-Up and Disposition     Return for to review 2D echo results .      [facility] On Call     [facility] On Call Nurse Care Line -  Assistance  Registered nurses in the [facility] On Call Center provide clinical advisement, health education, appointment booking, and other advisory services.  Call for this free service at [phone].             Medications           Message regarding Medications     Verify the changes and/or additions to your medication regime listed below are the same as discussed with your clinician today.  If any of these changes or additions are incorrect, please notify your healthcare provider.             Verify that the below list of medications is an accurate representation of the medications you are currently taking.  If none reported, the list may be blank. If incorrect, please contact your healthcare provider. Carry this list with you in case of emergency.           Current Medications     cloNIDine (CATAPRES) 0.1 MG tablet Take 1 tablet (0.1 mg total) by mouth 2 (two)  "times daily as needed.    diphenhydramine-acetaminophen (TYLENOL PM)  mg Tab Take 1 tablet by mouth nightly as needed.    losartan-hydrochlorothiazide 50-12.5 mg (HYZAAR) 50-12.5 mg per tablet TAKE ONE TABLET BY MOUTH TWICE A DAY    loteprednol (LOTEMAX) 0.5 % ophthalmic suspension 1 drop once daily. One drop in the left eye and 1 drops in the right eye    metoprolol succinate (TOPROL-XL) 25 MG 24 hr tablet Take 2 tablets (50 mg total) by mouth every evening.           Clinical Reference Information           Vital Signs - Last Recorded  Most recent update: 1/13/2017  9:05 AM by Yandy Calvo LPN    BP Pulse Ht Wt BMI    (!) 116/52 (BP Location: Left arm, Patient Position: Sitting, BP Method: Manual) 64 5' 4" (1.626 m) 82.8 kg (182 lb 8.7 oz) 31.33 kg/m2      Blood Pressure          Most Recent Value    BP  (!)  116/52      Allergies as of 1/13/2017     Claritin [Loratadine]    Hydrocodone-acetaminophen    Naproxen    Vibramycin [Doxycycline Calcium]    Amlodipine    Fenofibrate    Hydralazine Analogues    Isosorbide    Lasix [Furosemide]    Moxifloxacin    Timolol    Allegra [Fexofenadine]    Codeine    Erythromycin    Hydrocortisone (Bulk)    Macrolide Antibiotics    Patanol [Olopatadine]    Prednisone    Statins-hmg-coa Reductase Inhibitors    Xalatan [Latanoprost]      Immunizations Administered on Date of Encounter - 1/13/2017     None      Orders Placed During Today's Visit     Future Labs/Procedures Expected by Expires    2D Echo w/ Color Flow Doppler  As directed 1/13/2018      "

## 2017-01-13 NOTE — PROGRESS NOTES
Subjective:   Patient ID:  Shirley Metz is a 84 y.o. female who presents for follow up of Hypertension      HPI   Patient presents to clinic with complaints of elevated BP readings and tachycardia. She was treated for RLL pneumonia by PCP. Was started on Doxycycline by PCP, but was unable to tolerate due to muscle aches. Was switched to Bactrim. Had repeat CXR that was clear. Patient known to be very sensitive to meds in the past. She has had multiple changes made to her medications due to myalgias and weakness. BP today in clinic today looks good. She states that her BP usually runs high at night. States that she attempted a sleep study before, but was unable to complete because she couldn't fall asleep. Has SOB at rest. Gets it sometimes when she walks around. Has been having SOB for a couple of months. Has not gotten worse, but is still present.       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 Quintanilla    Eye surgery       Cataract wIOL    Breast cyst excision Left 1997 approx    Tonsillectomy  1938    Adenoidectomy  1938    Cholecystectomy  1975       Social History   Substance Use Topics    Smoking status: Never Smoker    Smokeless tobacco: Never Used      Comment: history of passive smoking from her ex-    Alcohol use 1.2 oz/week     2 Standard drinks or equivalent per week       Comment: 2 scotch drinks a week.        Family History   Problem Relation Age of Onset    Heart disease Mother     Hypertension Mother     Cancer Father 88     sarcoma( ?Kaposi's ) on leg    Deep vein thrombosis Neg Hx     Ovarian cancer Neg Hx     Breast cancer Neg Hx     Kidney disease Neg Hx     Strabismus Neg Hx     Retinal detachment Neg Hx     Macular degeneration Neg Hx     Glaucoma Neg Hx     Blindness Neg Hx     Amblyopia Neg Hx     Eczema Neg Hx     Lupus Neg Hx     Melanoma Neg Hx     Psoriasis Neg Hx     Diabetes Neg Hx     Stroke Neg Hx     Mental retardation Neg Hx     Mental illness Neg Hx     Hyperlipidemia Neg Hx     COPD Neg Hx     Asthma Neg Hx     Depression Neg Hx     Alcohol abuse Neg Hx     Drug abuse Neg Hx        Current Outpatient Prescriptions   Medication Sig    cloNIDine (CATAPRES) 0.1 MG tablet Take 1 tablet (0.1 mg total) by mouth 2 (two) times daily as needed. (Patient taking differently: Take 0.1 mg by mouth as needed. When systolic bp is greater than 170)    diphenhydramine-acetaminophen (TYLENOL PM)  mg Tab Take 1 tablet by mouth nightly as needed.    losartan-hydrochlorothiazide 50-12.5 mg (HYZAAR) 50-12.5 mg per tablet TAKE ONE TABLET BY MOUTH TWICE A DAY    loteprednol (LOTEMAX) 0.5 % ophthalmic suspension 1 drop once daily. One drop in the left eye and 1 drops in the right eye    metoprolol succinate (TOPROL-XL) 25 MG 24 hr tablet Take 2 tablets (50 mg total) by mouth every evening.     No current facility-administered medications for this visit.      Current Outpatient Prescriptions on File Prior to Visit   Medication Sig    cloNIDine (CATAPRES) 0.1 MG tablet Take 1 tablet (0.1 mg total) by mouth 2 (two) times daily as needed. (Patient taking differently: Take 0.1 mg by mouth as needed. When systolic bp is greater than 170)    diphenhydramine-acetaminophen (TYLENOL PM)  mg Tab Take 1 tablet by mouth nightly as needed.     losartan-hydrochlorothiazide 50-12.5 mg (HYZAAR) 50-12.5 mg per tablet TAKE ONE TABLET BY MOUTH TWICE A DAY    loteprednol (LOTEMAX) 0.5 % ophthalmic suspension 1 drop once daily. One drop in the left eye and 1 drops in the right eye    metoprolol succinate (TOPROL-XL) 25 MG 24 hr tablet Take 2 tablets (50 mg total) by mouth every evening.     No current facility-administered medications on file prior to visit.        Review of Systems   Constitution: Negative for decreased appetite, weakness, malaise/fatigue, weight gain and weight loss.   HENT: Negative for nosebleeds.    Cardiovascular: Negative for chest pain, claudication, dyspnea on exertion, irregular heartbeat, leg swelling, near-syncope, orthopnea, palpitations, paroxysmal nocturnal dyspnea and syncope.   Respiratory: Negative for cough, shortness of breath, sleep disturbances due to breathing, snoring and wheezing.    Hematologic/Lymphatic: Negative for bleeding problem. Does not bruise/bleed easily.   Skin: Negative for rash.   Musculoskeletal: Positive for arthritis and myalgias. Negative for back pain, falls, joint pain, joint swelling, muscle cramps and muscle weakness.   Gastrointestinal: Negative for bloating, abdominal pain, constipation, diarrhea, heartburn, nausea and vomiting.   Genitourinary: Negative for dysuria, hematuria and nocturia.   Neurological: Negative for excessive daytime sleepiness, dizziness, light-headedness, loss of balance, numbness, paresthesias and vertigo.       Objective:   Physical Exam   Constitutional: She is oriented to person, place, and time. She appears well-developed and well-nourished.   Neck: Neck supple. No JVD present.   Cardiovascular: Normal rate, regular rhythm, normal heart sounds and normal pulses.  Exam reveals no friction rub.    No murmur heard.  Pulmonary/Chest: Effort normal. No respiratory distress. She has no decreased breath sounds. She has no wheezes. She has no rhonchi. She has no rales.  "  Abdominal: Soft. Bowel sounds are normal. She exhibits no distension.   Musculoskeletal: She exhibits no edema or tenderness.   Neurological: She is alert and oriented to person, place, and time.   Skin: Skin is warm and dry. No rash noted.   Psychiatric: She has a normal mood and affect. Her behavior is normal.   Nursing note and vitals reviewed.    Vitals:    01/13/17 0901 01/13/17 0904   BP: (!) 130/50 (!) 116/52   BP Location: Right arm Left arm   Patient Position: Sitting Sitting   BP Method: Manual Manual   Pulse: 64    Weight: 82.8 kg (182 lb 8.7 oz)    Height: 5' 4" (1.626 m)      Lab Results   Component Value Date    CHOL 258 (H) 01/04/2017    CHOL 252 (H) 06/13/2016    CHOL 255 (H) 11/02/2015     Lab Results   Component Value Date    HDL 44 01/04/2017    HDL 42 06/13/2016    HDL 47 11/02/2015     Lab Results   Component Value Date    LDLCALC 177.2 (H) 01/04/2017    LDLCALC 152.4 06/13/2016    LDLCALC 160.0 (H) 11/02/2015     Lab Results   Component Value Date    TRIG 184 (H) 01/04/2017    TRIG 288 (H) 06/13/2016    TRIG 240 (H) 11/02/2015     Lab Results   Component Value Date    CHOLHDL 17.1 (L) 01/04/2017    CHOLHDL 16.7 (L) 06/13/2016    CHOLHDL 18.4 (L) 11/02/2015       Chemistry        Component Value Date/Time     01/04/2017 0849    K 4.2 01/04/2017 0849     01/04/2017 0849    CO2 25 01/04/2017 0849    BUN 26 (H) 01/04/2017 0849    CREATININE 1.5 (H) 01/04/2017 0849     01/04/2017 0849        Component Value Date/Time    CALCIUM 10.0 01/04/2017 0849    ALKPHOS 62 01/04/2017 0849    AST 24 01/04/2017 0849    ALT 17 01/04/2017 0849    BILITOT 0.6 01/04/2017 0849          Lab Results   Component Value Date    TSH 1.871 01/04/2017     Lab Results   Component Value Date    INR 1.0 01/01/2017    INR 1.0 02/09/2016    INR 1.0 01/22/2015     Lab Results   Component Value Date    WBC 6.49 01/04/2017    HGB 12.8 01/04/2017    HCT 37.7 01/04/2017    MCV 97 01/04/2017     01/04/2017 "     BMP  Sodium   Date Value Ref Range Status   01/04/2017 140 136 - 145 mmol/L Final     Potassium   Date Value Ref Range Status   01/04/2017 4.2 3.5 - 5.1 mmol/L Final     Chloride   Date Value Ref Range Status   01/04/2017 105 95 - 110 mmol/L Final     CO2   Date Value Ref Range Status   01/04/2017 25 23 - 29 mmol/L Final     BUN, Bld   Date Value Ref Range Status   01/04/2017 26 (H) 8 - 23 mg/dL Final     Creatinine   Date Value Ref Range Status   01/04/2017 1.5 (H) 0.5 - 1.4 mg/dL Final     Calcium   Date Value Ref Range Status   01/04/2017 10.0 8.7 - 10.5 mg/dL Final     Anion Gap   Date Value Ref Range Status   01/04/2017 10 8 - 16 mmol/L Final     eGFR if    Date Value Ref Range Status   01/04/2017 36.6 (A) >60 mL/min/1.73 m^2 Final     eGFR if non    Date Value Ref Range Status   01/04/2017 31.8 (A) >60 mL/min/1.73 m^2 Final     Comment:     Calculation used to obtain the estimated glomerular filtration  rate (eGFR) is the CKD-EPI equation. Since race is unknown   in our information system, the eGFR values for   -American and Non--American patients are given   for each creatinine result.       Estimated Creatinine Clearance: 29 mL/min (based on Cr of 1.5).    Assessment:     1. Essential hypertension    BP stable today in clinic. Her BP is running higher in the evenings. She responds well to clonidine PRN. She continues to have myalgias when she takes her medications. She is very sensitive to medications.   I think she may have undiagnosed ROBINSON that could be causing her BP to fluctuate. Wasn't able to complete sleet study years ago.   She was noted to have cardiomegaly on recent CXR. Will check 2D echo.   No CNS complaints to suggest TIA or CVA    Plan:   Repeat 2D echo   Recommend attempt sleep study to rule out ROBINSON  Continue to use Clonidine PRN for elevated BP reading as she has been doing  May need to repeat nuclear stress test   Will refer to pulmonology  pending echo results    RTC after echo for review of results

## 2017-01-17 ENCOUNTER — CLINICAL SUPPORT (OUTPATIENT)
Dept: CARDIOLOGY | Facility: CLINIC | Age: 82
End: 2017-01-17
Payer: MEDICARE

## 2017-01-17 DIAGNOSIS — I10 ESSENTIAL HYPERTENSION: Chronic | ICD-10-CM

## 2017-01-17 DIAGNOSIS — R00.2 PALPITATIONS: ICD-10-CM

## 2017-01-17 DIAGNOSIS — I11.9 LVH (LEFT VENTRICULAR HYPERTROPHY) DUE TO HYPERTENSIVE DISEASE, WITHOUT HEART FAILURE: Chronic | ICD-10-CM

## 2017-01-17 LAB
DIASTOLIC DYSFUNCTION: YES
ESTIMATED PA SYSTOLIC PRESSURE: 31.81
MITRAL VALVE REGURGITATION: ABNORMAL
RETIRED EF AND QEF - SEE NOTES: 60 (ref 55–65)

## 2017-01-17 PROCEDURE — 93306 TTE W/DOPPLER COMPLETE: CPT | Mod: S$GLB,,, | Performed by: INTERNAL MEDICINE

## 2017-01-18 ENCOUNTER — OFFICE VISIT (OUTPATIENT)
Dept: CARDIOLOGY | Facility: CLINIC | Age: 82
End: 2017-01-18
Payer: MEDICARE

## 2017-01-18 VITALS
BODY MASS INDEX: 31.17 KG/M2 | WEIGHT: 182.56 LBS | HEIGHT: 64 IN | DIASTOLIC BLOOD PRESSURE: 58 MMHG | HEART RATE: 60 BPM | SYSTOLIC BLOOD PRESSURE: 142 MMHG

## 2017-01-18 DIAGNOSIS — I10 ESSENTIAL HYPERTENSION: Primary | Chronic | ICD-10-CM

## 2017-01-18 DIAGNOSIS — E78.5 HYPERLIPIDEMIA, UNSPECIFIED HYPERLIPIDEMIA TYPE: Chronic | ICD-10-CM

## 2017-01-18 DIAGNOSIS — I11.9 LVH (LEFT VENTRICULAR HYPERTROPHY) DUE TO HYPERTENSIVE DISEASE, WITHOUT HEART FAILURE: Chronic | ICD-10-CM

## 2017-01-18 PROCEDURE — 99999 PR PBB SHADOW E&M-EST. PATIENT-LVL III: CPT | Mod: PBBFAC,,, | Performed by: NURSE PRACTITIONER

## 2017-01-18 PROCEDURE — 1159F MED LIST DOCD IN RCRD: CPT | Mod: S$GLB,,, | Performed by: NURSE PRACTITIONER

## 2017-01-18 PROCEDURE — 3078F DIAST BP <80 MM HG: CPT | Mod: S$GLB,,, | Performed by: NURSE PRACTITIONER

## 2017-01-18 PROCEDURE — 3077F SYST BP >= 140 MM HG: CPT | Mod: S$GLB,,, | Performed by: NURSE PRACTITIONER

## 2017-01-18 PROCEDURE — 1126F AMNT PAIN NOTED NONE PRSNT: CPT | Mod: S$GLB,,, | Performed by: NURSE PRACTITIONER

## 2017-01-18 PROCEDURE — 1157F ADVNC CARE PLAN IN RCRD: CPT | Mod: S$GLB,,, | Performed by: NURSE PRACTITIONER

## 2017-01-18 PROCEDURE — 99214 OFFICE O/P EST MOD 30 MIN: CPT | Mod: S$GLB,,, | Performed by: NURSE PRACTITIONER

## 2017-01-18 PROCEDURE — 1160F RVW MEDS BY RX/DR IN RCRD: CPT | Mod: S$GLB,,, | Performed by: NURSE PRACTITIONER

## 2017-01-18 PROCEDURE — 99499 UNLISTED E&M SERVICE: CPT | Mod: S$GLB,,, | Performed by: NURSE PRACTITIONER

## 2017-01-18 RX ORDER — CLONIDINE HYDROCHLORIDE 0.1 MG/1
0.1 TABLET ORAL 2 TIMES DAILY PRN
Qty: 60 TABLET | Refills: 11 | Status: SHIPPED | OUTPATIENT
Start: 2017-01-18 | End: 2018-02-14 | Stop reason: SDUPTHER

## 2017-01-18 NOTE — MR AVS SNAPSHOT
[facility]  [address]  [place] [address]  Phone: [phone]  Fax: [phone]                  Shirley Metz   2017 2:00 PM   Office Visit    Description:  Female : 12/3/1932   Provider:  FABIO Estrella   Department:  [facility]           Diagnoses this Visit        Comments    Essential hypertension    -  Primary     Hyperlipidemia, unspecified hyperlipidemia type         LVH (left ventricular hypertrophy) due to hypertensive disease, without heart failure                To Do List           Future Appointments        Provider Department Dept Phone    2017 10:30 AM Noemi Chery MD [facility] - Rheumatology [phone]    2017 8:00 AM PRIMARY CARE NURSE, [facility] [phone]    4/10/2017 9:00 AM Yandy Terrell MD [facility] [phone]      Goals (5 Years of Data)     None      Follow-Up and Disposition     Return in about 3 months (around 2017) for HTN .       These Medications        Disp Refills Start End    cloNIDine (CATAPRES) 0.1 MG tablet 60 tablet 11 2017    Take 1 tablet (0.1 mg total) by mouth 2 (two) times daily as needed (BP greater than 160/90). - Oral    Pharmacy: [facility] - [place] - [address] Ph #: [phone]         [facility] On Call     [facility] On Call Nurse Care Line -  Assistance  Registered nurses in the [facility] On Call Center provide clinical advisement, health education, appointment booking, and other advisory services.  Call for this free service at [phone].             Medications           Message regarding Medications     Verify the changes and/or additions to your medication regime listed below are the same as discussed with your clinician today.  If any of these changes or additions are incorrect, please notify your healthcare provider.        CHANGE how you are  "taking these medications     Start Taking Instead of    cloNIDine (CATAPRES) 0.1 MG tablet cloNIDine (CATAPRES) 0.1 MG tablet    Dosage:  Take 1 tablet (0.1 mg total) by mouth 2 (two) times daily as needed (BP greater than 160/90). Dosage:  Take 1 tablet (0.1 mg total) by mouth 2 (two) times daily as needed.    Reason for Change:  Reorder            Verify that the below list of medications is an accurate representation of the medications you are currently taking.  If none reported, the list may be blank. If incorrect, please contact your healthcare provider. Carry this list with you in case of emergency.           Current Medications     cloNIDine (CATAPRES) 0.1 MG tablet Take 1 tablet (0.1 mg total) by mouth 2 (two) times daily as needed (BP greater than 160/90).    diphenhydramine-acetaminophen (TYLENOL PM)  mg Tab Take 1 tablet by mouth nightly as needed.    losartan-hydrochlorothiazide 50-12.5 mg (HYZAAR) 50-12.5 mg per tablet TAKE ONE TABLET BY MOUTH TWICE A DAY    loteprednol (LOTEMAX) 0.5 % ophthalmic suspension 1 drop once daily. One drop in the left eye and 1 drops in the right eye    metoprolol succinate (TOPROL-XL) 25 MG 24 hr tablet Take 2 tablets (50 mg total) by mouth every evening.           Clinical Reference Information           Vital Signs - Last Recorded  Most recent update: 1/18/2017  1:47 PM by Yandy Calvo LPN    BP Pulse Ht Wt BMI    (!) 150/58 (BP Location: Right arm, Patient Position: Sitting, BP Method: Manual) 60 5' 4" (1.626 m) 82.8 kg (182 lb 8.7 oz) 31.33 kg/m2      Blood Pressure          Most Recent Value    BP  (!)  150/58      Allergies as of 1/18/2017     Claritin [Loratadine]    Hydrocodone-acetaminophen    Naproxen    Vibramycin [Doxycycline Calcium]    Amlodipine    Fenofibrate    Hydralazine Analogues    Isosorbide    Lasix [Furosemide]    Moxifloxacin    Timolol    Allegra [Fexofenadine]    Codeine    Erythromycin    Hydrocortisone (Bulk)    Macrolide Antibiotics    " Patanol [Olopatadine]    Prednisone    Statins-hmg-coa Reductase Inhibitors    Xalatan [Latanoprost]      Immunizations Administered on Date of Encounter - 1/18/2017     None

## 2017-01-18 NOTE — PROGRESS NOTES
"Subjective:   Patient ID:  Shirley Metz is a 84 y.o. female who presents for follow up of No chief complaint on file.      HPI  Patient presents to clinic for echo results review and symptom check. On last viist, she complained of SOB and palpitations. Had CXR in the ED not long ago and was treated for pneumonia. Repeat CXR was negative. She had issues with her BP being controlled in the evening. Has had issues medication intolerance in the past. VERY sensitive to many medications that I have tried to prescribe for her HTN. He tends to get significant myalgias with nearly everything she tries. She even has this side effect with the 3 hypertensive agents she is currently on. She is taking Clonidine nightly to help control her BP.This has been working for her lately. She is no longer having SOB or tachycardia, but is having insomnia. Feels that this may be related to her tossing and turning from "stuffy nose". Attempted to do sleep study years ago, but was unable to fall asleep, so test canceled. She canceled her recent Pulmonology appt. Her BP looks ok today in clinic. She has no CNS complaints to suggest TIA or CVA. Has no chest pain or angina symptoms. Currently feels fine. 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 Quintanilla    Eye surgery       " Cataract wIOL    Breast cyst excision Left 1997 approx    Tonsillectomy  1938    Adenoidectomy  1938    Cholecystectomy  1975       Social History   Substance Use Topics    Smoking status: Never Smoker    Smokeless tobacco: Never Used      Comment: history of passive smoking from her ex-    Alcohol use 1.2 oz/week     2 Standard drinks or equivalent per week      Comment: 2 scotch drinks a week.        Family History   Problem Relation Age of Onset    Heart disease Mother     Hypertension Mother     Cancer Father 88     sarcoma( ?Kaposi's ) on leg    Deep vein thrombosis Neg Hx     Ovarian cancer Neg Hx     Breast cancer Neg Hx     Kidney disease Neg Hx     Strabismus Neg Hx     Retinal detachment Neg Hx     Macular degeneration Neg Hx     Glaucoma Neg Hx     Blindness Neg Hx     Amblyopia Neg Hx     Eczema Neg Hx     Lupus Neg Hx     Melanoma Neg Hx     Psoriasis Neg Hx     Diabetes Neg Hx     Stroke Neg Hx     Mental retardation Neg Hx     Mental illness Neg Hx     Hyperlipidemia Neg Hx     COPD Neg Hx     Asthma Neg Hx     Depression Neg Hx     Alcohol abuse Neg Hx     Drug abuse Neg Hx        Current Outpatient Prescriptions   Medication Sig    cloNIDine (CATAPRES) 0.1 MG tablet Take 1 tablet (0.1 mg total) by mouth 2 (two) times daily as needed (BP greater than 160/90).    diphenhydramine-acetaminophen (TYLENOL PM)  mg Tab Take 1 tablet by mouth nightly as needed.    losartan-hydrochlorothiazide 50-12.5 mg (HYZAAR) 50-12.5 mg per tablet TAKE ONE TABLET BY MOUTH TWICE A DAY    loteprednol (LOTEMAX) 0.5 % ophthalmic suspension 1 drop once daily. One drop in the left eye and 1 drops in the right eye    metoprolol succinate (TOPROL-XL) 25 MG 24 hr tablet Take 2 tablets (50 mg total) by mouth every evening.     No current facility-administered medications for this visit.      Current Outpatient Prescriptions on File Prior to Visit   Medication Sig     diphenhydramine-acetaminophen (TYLENOL PM)  mg Tab Take 1 tablet by mouth nightly as needed.    losartan-hydrochlorothiazide 50-12.5 mg (HYZAAR) 50-12.5 mg per tablet TAKE ONE TABLET BY MOUTH TWICE A DAY    loteprednol (LOTEMAX) 0.5 % ophthalmic suspension 1 drop once daily. One drop in the left eye and 1 drops in the right eye    metoprolol succinate (TOPROL-XL) 25 MG 24 hr tablet Take 2 tablets (50 mg total) by mouth every evening.    [DISCONTINUED] cloNIDine (CATAPRES) 0.1 MG tablet Take 1 tablet (0.1 mg total) by mouth 2 (two) times daily as needed. (Patient taking differently: Take 0.1 mg by mouth as needed. When systolic bp is greater than 170)     No current facility-administered medications on file prior to visit.        Review of Systems   Constitution: Negative for decreased appetite, weakness, malaise/fatigue, weight gain and weight loss.   HENT: Negative for nosebleeds.         Nasal congestion      Cardiovascular: Negative for chest pain, claudication, dyspnea on exertion, irregular heartbeat, leg swelling, near-syncope, orthopnea, palpitations, paroxysmal nocturnal dyspnea and syncope.   Respiratory: Negative for cough, shortness of breath, sleep disturbances due to breathing, snoring and wheezing.    Hematologic/Lymphatic: Negative for bleeding problem. Does not bruise/bleed easily.   Skin: Negative for rash.   Musculoskeletal: Positive for arthritis and myalgias. Negative for back pain, falls, joint pain, joint swelling, muscle cramps and muscle weakness.   Gastrointestinal: Negative for bloating, abdominal pain, constipation, diarrhea, heartburn, nausea and vomiting.   Genitourinary: Negative for dysuria, hematuria and nocturia.   Neurological: Negative for excessive daytime sleepiness, dizziness, light-headedness, loss of balance, numbness, paresthesias and vertigo.   Psychiatric/Behavioral: The patient has insomnia.        Objective:   Physical Exam   Constitutional: She is oriented to  "person, place, and time. She appears well-developed and well-nourished.   Neck: Neck supple. No JVD present.   Cardiovascular: Normal rate, regular rhythm, normal heart sounds and normal pulses.  Exam reveals no friction rub.    No murmur heard.  Pulmonary/Chest: Effort normal and breath sounds normal. No respiratory distress. She has no wheezes. She has no rales.   Abdominal: Soft. Bowel sounds are normal. She exhibits no distension.   Musculoskeletal: She exhibits no edema or tenderness.   Neurological: She is alert and oriented to person, place, and time.   Skin: Skin is warm and dry. No rash noted.   Psychiatric: She has a normal mood and affect. Her behavior is normal.   Nursing note and vitals reviewed.    Vitals:    01/18/17 1344   BP: (!) 142/58   BP Location: Right arm   Patient Position: Sitting   BP Method: Manual   Pulse: 60   Weight: 82.8 kg (182 lb 8.7 oz)   Height: 5' 4" (1.626 m)     Lab Results   Component Value Date    CHOL 258 (H) 01/04/2017    CHOL 252 (H) 06/13/2016    CHOL 255 (H) 11/02/2015     Lab Results   Component Value Date    HDL 44 01/04/2017    HDL 42 06/13/2016    HDL 47 11/02/2015     Lab Results   Component Value Date    LDLCALC 177.2 (H) 01/04/2017    LDLCALC 152.4 06/13/2016    LDLCALC 160.0 (H) 11/02/2015     Lab Results   Component Value Date    TRIG 184 (H) 01/04/2017    TRIG 288 (H) 06/13/2016    TRIG 240 (H) 11/02/2015     Lab Results   Component Value Date    CHOLHDL 17.1 (L) 01/04/2017    CHOLHDL 16.7 (L) 06/13/2016    CHOLHDL 18.4 (L) 11/02/2015       Chemistry        Component Value Date/Time     01/04/2017 0849    K 4.2 01/04/2017 0849     01/04/2017 0849    CO2 25 01/04/2017 0849    BUN 26 (H) 01/04/2017 0849    CREATININE 1.5 (H) 01/04/2017 0849     01/04/2017 0849        Component Value Date/Time    CALCIUM 10.0 01/04/2017 0849    ALKPHOS 62 01/04/2017 0849    AST 24 01/04/2017 0849    ALT 17 01/04/2017 0849    BILITOT 0.6 01/04/2017 0849    "       Lab Results   Component Value Date    TSH 1.871 01/04/2017     Lab Results   Component Value Date    INR 1.0 01/01/2017    INR 1.0 02/09/2016    INR 1.0 01/22/2015     Lab Results   Component Value Date    WBC 6.49 01/04/2017    HGB 12.8 01/04/2017    HCT 37.7 01/04/2017    MCV 97 01/04/2017     01/04/2017     BMP  Sodium   Date Value Ref Range Status   01/04/2017 140 136 - 145 mmol/L Final     Potassium   Date Value Ref Range Status   01/04/2017 4.2 3.5 - 5.1 mmol/L Final     Chloride   Date Value Ref Range Status   01/04/2017 105 95 - 110 mmol/L Final     CO2   Date Value Ref Range Status   01/04/2017 25 23 - 29 mmol/L Final     BUN, Bld   Date Value Ref Range Status   01/04/2017 26 (H) 8 - 23 mg/dL Final     Creatinine   Date Value Ref Range Status   01/04/2017 1.5 (H) 0.5 - 1.4 mg/dL Final     Calcium   Date Value Ref Range Status   01/04/2017 10.0 8.7 - 10.5 mg/dL Final     Anion Gap   Date Value Ref Range Status   01/04/2017 10 8 - 16 mmol/L Final     eGFR if    Date Value Ref Range Status   01/04/2017 36.6 (A) >60 mL/min/1.73 m^2 Final     eGFR if non    Date Value Ref Range Status   01/04/2017 31.8 (A) >60 mL/min/1.73 m^2 Final     Comment:     Calculation used to obtain the estimated glomerular filtration  rate (eGFR) is the CKD-EPI equation. Since race is unknown   in our information system, the eGFR values for   -American and Non--American patients are given   for each creatinine result.       CrCl cannot be calculated (Patient has no serum creatinine result on file.).    CONCLUSIONS     1 - Normal left ventricular systolic function (EF 60-65%).     2 - Left ventricular diastolic dysfunction.     3 - Normal right ventricular systolic function .             This document has been electronically    SIGNED BY: Pete Durán MD On: 01/17/2017 14:38    Assessment:     1. Essential hypertension    2. Hyperlipidemia, unspecified hyperlipidemia type    3.  LVH (left ventricular hypertrophy) due to hypertensive disease, without heart failure      BP stable  BP controlled at night with her taking Clonidine at night and extra tab PRN  No CNS complaints to suggest TIA or CVA.   No evidence of volume overload  Normal LVF on echo, DD  Recommend sleep study, but patient would like to try OTC nasal spray to see if this helps with her insomnia     Plan:   Continue current medical management  Heart healthy diet   Risk factor modification   Pulmonology appt recommended to rule out ROBINSON  RTC in 3 months  Or sooner if needed

## 2017-04-06 ENCOUNTER — CLINICAL SUPPORT (OUTPATIENT)
Dept: INTERNAL MEDICINE | Facility: CLINIC | Age: 82
End: 2017-04-06
Payer: MEDICARE

## 2017-04-06 DIAGNOSIS — N18.30 CKD (CHRONIC KIDNEY DISEASE) STAGE 3, GFR 30-59 ML/MIN: ICD-10-CM

## 2017-04-06 DIAGNOSIS — I10 ESSENTIAL HYPERTENSION: ICD-10-CM

## 2017-04-06 LAB
ANION GAP SERPL CALC-SCNC: 11 MMOL/L
BUN SERPL-MCNC: 27 MG/DL
CALCIUM SERPL-MCNC: 9.9 MG/DL
CHLORIDE SERPL-SCNC: 105 MMOL/L
CO2 SERPL-SCNC: 27 MMOL/L
CREAT SERPL-MCNC: 1.4 MG/DL
EST. GFR  (AFRICAN AMERICAN): 39.8 ML/MIN/1.73 M^2
EST. GFR  (NON AFRICAN AMERICAN): 34.5 ML/MIN/1.73 M^2
GLUCOSE SERPL-MCNC: 105 MG/DL
POTASSIUM SERPL-SCNC: 3.8 MMOL/L
SODIUM SERPL-SCNC: 143 MMOL/L

## 2017-04-06 PROCEDURE — 99999 PR PBB SHADOW E&M-EST. PATIENT-LVL I: CPT | Mod: PBBFAC,,,

## 2017-04-06 PROCEDURE — 80048 BASIC METABOLIC PNL TOTAL CA: CPT

## 2017-04-17 ENCOUNTER — OFFICE VISIT (OUTPATIENT)
Dept: INTERNAL MEDICINE | Facility: CLINIC | Age: 82
End: 2017-04-17
Payer: MEDICARE

## 2017-04-17 VITALS
OXYGEN SATURATION: 94 % | WEIGHT: 178.81 LBS | DIASTOLIC BLOOD PRESSURE: 59 MMHG | HEART RATE: 66 BPM | SYSTOLIC BLOOD PRESSURE: 137 MMHG | TEMPERATURE: 98 F | BODY MASS INDEX: 30.69 KG/M2

## 2017-04-17 DIAGNOSIS — L98.9 FINGER LESION: ICD-10-CM

## 2017-04-17 DIAGNOSIS — N18.30 CKD (CHRONIC KIDNEY DISEASE) STAGE 3, GFR 30-59 ML/MIN: ICD-10-CM

## 2017-04-17 DIAGNOSIS — I10 ESSENTIAL HYPERTENSION: Primary | ICD-10-CM

## 2017-04-17 DIAGNOSIS — M19.072 OSTEOARTHRITIS OF JOINTS OF TOES OF BOTH FEET: ICD-10-CM

## 2017-04-17 DIAGNOSIS — R35.0 URINARY FREQUENCY: ICD-10-CM

## 2017-04-17 DIAGNOSIS — R82.90 ABNORMAL URINE FINDING: ICD-10-CM

## 2017-04-17 DIAGNOSIS — M19.071 OSTEOARTHRITIS OF JOINTS OF TOES OF BOTH FEET: ICD-10-CM

## 2017-04-17 LAB
BILIRUB SERPL-MCNC: ABNORMAL MG/DL
BLOOD URINE, POC: ABNORMAL
COLOR, POC UA: ABNORMAL
GLUCOSE UR QL STRIP: NORMAL
KETONES UR QL STRIP: ABNORMAL
LEUKOCYTE ESTERASE URINE, POC: ABNORMAL
NITRITE, POC UA: ABNORMAL
PH, POC UA: 5
PROTEIN, POC: ABNORMAL
SPECIFIC GRAVITY, POC UA: 1.02
UROBILINOGEN, POC UA: NORMAL

## 2017-04-17 PROCEDURE — 3075F SYST BP GE 130 - 139MM HG: CPT | Mod: S$GLB,,, | Performed by: FAMILY MEDICINE

## 2017-04-17 PROCEDURE — 1126F AMNT PAIN NOTED NONE PRSNT: CPT | Mod: S$GLB,,, | Performed by: FAMILY MEDICINE

## 2017-04-17 PROCEDURE — 81002 URINALYSIS NONAUTO W/O SCOPE: CPT | Mod: S$GLB,,, | Performed by: FAMILY MEDICINE

## 2017-04-17 PROCEDURE — 1160F RVW MEDS BY RX/DR IN RCRD: CPT | Mod: S$GLB,,, | Performed by: FAMILY MEDICINE

## 2017-04-17 PROCEDURE — 87086 URINE CULTURE/COLONY COUNT: CPT

## 2017-04-17 PROCEDURE — 3078F DIAST BP <80 MM HG: CPT | Mod: S$GLB,,, | Performed by: FAMILY MEDICINE

## 2017-04-17 PROCEDURE — 99999 PR PBB SHADOW E&M-EST. PATIENT-LVL III: CPT | Mod: PBBFAC,,, | Performed by: FAMILY MEDICINE

## 2017-04-17 PROCEDURE — 1159F MED LIST DOCD IN RCRD: CPT | Mod: S$GLB,,, | Performed by: FAMILY MEDICINE

## 2017-04-17 PROCEDURE — 99499 UNLISTED E&M SERVICE: CPT | Mod: S$GLB,,, | Performed by: FAMILY MEDICINE

## 2017-04-17 PROCEDURE — 99214 OFFICE O/P EST MOD 30 MIN: CPT | Mod: S$GLB,,, | Performed by: FAMILY MEDICINE

## 2017-04-17 RX ORDER — VALSARTAN AND HYDROCHLOROTHIAZIDE 160; 25 MG/1; MG/1
1 TABLET ORAL DAILY
Qty: 30 TABLET | Refills: 2 | Status: SHIPPED | OUTPATIENT
Start: 2017-04-17 | End: 2017-05-03

## 2017-04-17 NOTE — PROGRESS NOTES
Subjective:       Patient ID: Shirley Metz is a 84 y.o. female.    Chief Complaint: 6mth check up; possible bladder infection; toe issues (right foot); and blister on finger    HPI Comments: Here with recent onset UTI symptoms. Also here to review recent labs.  BP checks - 130/53- 155/70 in AM. PM readings 150-160. When it hits 160 she is to take the clonidine. She has been taking the clonidine nightly now.  She has had problems with muscle weakness and pain that she feels occur due to various medications - including metoprolol per her assessment, and other BP meds.  She has been told she has hammer toes and wants me to look at them asa well.   She also has a lesion to her finger.  She calls it a blister.  It has been present for a while.    Urinary Tract Infection    This is a new problem. The current episode started in the past 7 days. The quality of the pain is described as burning. The pain is mild. There has been no fever. There is no history of pyelonephritis. Associated symptoms include frequency and urgency. Pertinent negatives include no chills, discharge, hesitancy or nausea. She has tried home medications and increased fluids for the symptoms. Her past medical history is significant for recurrent UTIs (las year). There is no history of kidney stones.     Review of Systems   Constitutional: Negative for chills.   Gastrointestinal: Negative for nausea.   Genitourinary: Positive for frequency and urgency. Negative for hesitancy.       Objective:      Physical Exam   Constitutional: She is oriented to person, place, and time. She appears well-developed.   HENT:   Head: Normocephalic and atraumatic.   Cardiovascular: Normal rate, regular rhythm and normal heart sounds.    Pulmonary/Chest: Effort normal and breath sounds normal.   Neurological: She is alert and oriented to person, place, and time.   Skin: Skin is warm and dry.   To tip R middle finger lesion appears to be chronic blister. No  erythema. Mild TTP.   Psychiatric: She has a normal mood and affect. Her behavior is normal.         Assessment/Plan:     1. Essential hypertension  valsartan-hydrochlorothiazide (DIOVAN-HCT) 160-25 mg per tablet   2. Urinary frequency  POCT urine dipstick without microscope    Urine culture   3. CKD (chronic kidney disease) stage 3, GFR 30-59 ml/min     4. Finger lesion  Ambulatory referral to Dermatology   5. 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 Abnormal urine finding  Urine culture   prefers to await cx results to rx med for UTI  Protection to finger lesion for now - monitor - derm for future  CKD - increase fluids, recheck numbers in 4-6 months - control BP - DASH diet info given  Long discussion re BP meds and possible change - she is reticent to increase meds due to problems in the past - consider change to valsartan with option to increase to 320/25 if no problems

## 2017-04-17 NOTE — MR AVS SNAPSHOT
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       These Medications        Disp Refills Start End    valsartan-hydrochlorothiazide (DIOVAN-HCT) 160-25 mg per tablet 30 tablet 2 2017    Take 1 tablet by mouth once daily. - Oral    Pharmacy: [facility] - [place], [place] - [address] [address] Ph #: [phone]         [facility]     [facility] -  Assistance  Unless otherwise directed by your provider, please contact [facility], our nurse care line that is available for  assistance.     Registered nurses in the [facility] provide: appointment scheduling, clinical advisement, health education, and other advisory services.  Call: [phone] (toll free)               Medications           Message regarding Medications     Verify the changes and/or additions to your medication regime listed below are the same as discussed with your clinician today.  If any of these changes or additions are  incorrect, please notify your healthcare provider.        START taking these NEW medications        Refills    valsartan-hydrochlorothiazide (DIOVAN-HCT) 160-25 mg per tablet 2    Sig: Take 1 tablet by mouth once daily.    Class: Normal    Route: Oral      STOP taking these medications     losartan-hydrochlorothiazide 50-12.5 mg (HYZAAR) 50-12.5 mg per tablet TAKE ONE TABLET BY MOUTH TWICE A DAY           Verify that the below list of medications is an accurate representation of the medications you are currently taking.  If none reported, the list may be blank. If incorrect, please contact your healthcare provider. Carry this list with you in case of emergency.           Current Medications     cloNIDine (CATAPRES) 0.1 MG tablet Take 1 tablet (0.1 mg total) by mouth 2 (two) times daily as needed (BP greater than 160/90).    loteprednol (LOTEMAX) 0.5 % ophthalmic suspension 1 drop once daily. One drop in the left eye and 1 drops in the right eye    metoprolol succinate (TOPROL-XL) 25 MG 24 hr tablet Take 2 tablets (50 mg total) by mouth every evening.    diphenhydramine-acetaminophen (TYLENOL PM)  mg Tab Take 1 tablet by mouth nightly as needed.    valsartan-hydrochlorothiazide (DIOVAN-HCT) 160-25 mg per tablet Take 1 tablet by mouth once daily.           Clinical Reference Information           Your Vitals Were     BP Pulse Temp    137/59 (BP Location: Right arm, Patient Position: Sitting, BP Method: Automatic) 66 98.3 °F (36.8 °C) (Tympanic)    Weight SpO2 BMI    81.1 kg (178 lb 12.7 oz) 94% 30.69 kg/m2      Blood Pressure          Most Recent Value    BP  (!)  137/59      Allergies as of 4/17/2017     Claritin [Loratadine]    Hydrocodone-acetaminophen    Naproxen    Vibramycin [Doxycycline Calcium]    Amlodipine    Fenofibrate    Hydralazine Analogues    Isosorbide    Lasix [Furosemide]    Moxifloxacin    Timolol    Allegra [Fexofenadine]    Codeine    Erythromycin    Hydrocortisone (Bulk)    Macrolide  Antibiotics    Patanol [Olopatadine]    Prednisone    Statins-hmg-coa Reductase Inhibitors    Xalatan [Latanoprost]      Immunizations Administered on Date of Encounter - 4/17/2017     None      Orders Placed During Today's Visit      Normal Orders This Visit    Ambulatory referral to Dermatology     POCT urine dipstick without microscope     Urine culture       Instructions      Controlling High Blood Pressure  High blood pressure (hypertension) is often called the silent killer. This is because many people who have it dont know it. High blood pressure is defined as 140/90 mm Hg or higher. Know your blood pressure and remember to check it regularly. Doing so can save your life. Here are some things you can do to help control your blood pressure.    Choose heart-healthy foods  · Select low-salt, low-fat foods. Limit sodium intake to 2,400 mg per day or the amount suggested by your healthcare provider.  · Limit canned, dried, cured, packaged, and fast foods. These can contain a lot of salt.  · Eat 8 to 10 servings of fruits and vegetables every day.  · Choose lean meats, fish, or chicken.  · Eat whole-grain pasta, brown rice, and beans.  · Eat 2 to 3 servings of low-fat or fat-free dairy products.  · Ask your doctor about the DASH eating plan. This plan helps reduce blood pressure.  · When you go to a restaurant, ask that your meal be prepared with no added salt.  Maintain a healthy weight  · Ask your healthcare provider how many calories to eat a day. Then stick to that number.  · Ask your healthcare provider what weight range is healthiest for you. If you are overweight, a weight loss of only 3% to 5% of your body weight can help lower blood pressure. Generally, a good weight loss goal is to lose 10% of your body weight in a year.  · Limit snacks and sweets.  · Get regular exercise.  Get up and get active  · Choose activities you enjoy. Find ones you can do with friends or family. This includes bicycling, dancing,  walking, and jogging.  · Park farther away from building entrances.  · Use stairs instead of the elevator.  · When you can, walk or bike instead of driving.  · [place] leaves, garden, or do household repairs.  · Be active at a moderate to vigorous level of physical activity for at least 40 minutes for a minimum of 3 to 4 days a week.   Manage stress  · Make time to relax and enjoy life. Find time to laugh.  · Communicate your concerns with your loved ones and your healthcare provider.  · Visit with family and friends, and keep up with hobbies.  Limit alcohol and quit smoking  · Men should have no more than 2 drinks per day.  · Women should have no more than 1 drink per day.  · Talk with your healthcare provider about quitting smoking. Smoking significantly increases your risk for heart disease and stroke. Ask your healthcare provider about community smoking cessation programs and other options.  Medicines  If lifestyle changes arent enough, your healthcare provider may prescribe high blood pressure medicine. Take all medicines as prescribed. If you have any questions about your medicines, ask your healthcare provider before stopping or changing them.   Date Last Reviewed: 4/27/2016  © 5116-0150 [employer]. [address] [place] [address]. All rights reserved. This information is not intended as a substitute for professional medical care. Always follow your healthcare professional's instructions.             Language Assistance Services     ATTENTION: Language assistance services are available, free of charge. Please call [phone].      ATENCIÓN: Si habla español, tiene a wylie disposición servicios gratuitos de asistencia lingüística. Llame al [phone].     [facility] Ý: N?u b?n nói Ti?ng Vi?t, có các d?ch v? h? tr? ngôn ng? mi?n phí dành cho b?n. G?i s? [phone].         [facility] complies with applicable Federal civil rights laws and does not discriminate on the basis of  race, color, national origin, age, disability, or sex.

## 2017-04-17 NOTE — PATIENT INSTRUCTIONS
Controlling High Blood Pressure  High blood pressure (hypertension) is often called the silent killer. This is because many people who have it dont know it. High blood pressure is defined as 140/90 mm Hg or higher. Know your blood pressure and remember to check it regularly. Doing so can save your life. Here are some things you can do to help control your blood pressure.    Choose heart-healthy foods  · Select low-salt, low-fat foods. Limit sodium intake to 2,400 mg per day or the amount suggested by your healthcare provider.  · Limit canned, dried, cured, packaged, and fast foods. These can contain a lot of salt.  · Eat 8 to 10 servings of fruits and vegetables every day.  · Choose lean meats, fish, or chicken.  · Eat whole-grain pasta, brown rice, and beans.  · Eat 2 to 3 servings of low-fat or fat-free dairy products.  · Ask your doctor about the DASH eating plan. This plan helps reduce blood pressure.  · When you go to a restaurant, ask that your meal be prepared with no added salt.  Maintain a healthy weight  · Ask your healthcare provider how many calories to eat a day. Then stick to that number.  · Ask your healthcare provider what weight range is healthiest for you. If you are overweight, a weight loss of only 3% to 5% of your body weight can help lower blood pressure. Generally, a good weight loss goal is to lose 10% of your body weight in a year.  · Limit snacks and sweets.  · Get regular exercise.  Get up and get active  · Choose activities you enjoy. Find ones you can do with friends or family. This includes bicycling, dancing, walking, and jogging.  · Park farther away from building entrances.  · Use stairs instead of the elevator.  · When you can, walk or bike instead of driving.  · Sherman leaves, garden, or do household repairs.  · Be active at a moderate to vigorous level of physical activity for at least 40 minutes for a minimum of 3 to 4 days a week.   Manage stress  · Make time to relax and enjoy  life. Find time to laugh.  · Communicate your concerns with your loved ones and your healthcare provider.  · Visit with family and friends, and keep up with hobbies.  Limit alcohol and quit smoking  · Men should have no more than 2 drinks per day.  · Women should have no more than 1 drink per day.  · Talk with your healthcare provider about quitting smoking. Smoking significantly increases your risk for heart disease and stroke. Ask your healthcare provider about community smoking cessation programs and other options.  Medicines  If lifestyle changes arent enough, your healthcare provider may prescribe high blood pressure medicine. Take all medicines as prescribed. If you have any questions about your medicines, ask your healthcare provider before stopping or changing them.   Date Last Reviewed: 4/27/2016  © 9037-2766 [employer]. [address] [place] [address], [place] [address]. All rights reserved. This information is not intended as a substitute for professional medical care. Always follow your healthcare professional's instructions.

## 2017-04-19 LAB
BACTERIA UR CULT: NORMAL
BACTERIA UR CULT: NORMAL

## 2017-04-25 ENCOUNTER — PATIENT MESSAGE (OUTPATIENT)
Dept: INTERNAL MEDICINE | Facility: CLINIC | Age: 82
End: 2017-04-25

## 2017-04-26 NOTE — TELEPHONE ENCOUNTER
.  I spoke with the patient.  It appears she is not tolerating the diuretic portion.  I asked her to take one half the valsartan/thiazide 160/25 tablet for now and give me a call in a week read tolerance etc.  I asked her to continue taking one clonidine every night.  She has it prescribed is as needed.  If her blood pressure gets into the 180s she can certainly take it in the morning as well as she has been occasionally.

## 2017-05-03 ENCOUNTER — OFFICE VISIT (OUTPATIENT)
Dept: INTERNAL MEDICINE | Facility: CLINIC | Age: 82
End: 2017-05-03
Payer: MEDICARE

## 2017-05-03 ENCOUNTER — PATIENT MESSAGE (OUTPATIENT)
Dept: INTERNAL MEDICINE | Facility: CLINIC | Age: 82
End: 2017-05-03

## 2017-05-03 ENCOUNTER — APPOINTMENT (OUTPATIENT)
Dept: RADIOLOGY | Facility: HOSPITAL | Age: 82
End: 2017-05-03
Attending: FAMILY MEDICINE
Payer: MEDICARE

## 2017-05-03 ENCOUNTER — TELEPHONE (OUTPATIENT)
Dept: INTERNAL MEDICINE | Facility: CLINIC | Age: 82
End: 2017-05-03

## 2017-05-03 VITALS
HEART RATE: 67 BPM | HEIGHT: 64 IN | OXYGEN SATURATION: 93 % | WEIGHT: 182.56 LBS | TEMPERATURE: 98 F | SYSTOLIC BLOOD PRESSURE: 153 MMHG | BODY MASS INDEX: 31.17 KG/M2 | DIASTOLIC BLOOD PRESSURE: 67 MMHG

## 2017-05-03 DIAGNOSIS — K59.09 CONSTIPATION, CHRONIC: ICD-10-CM

## 2017-05-03 DIAGNOSIS — R03.0 ELEVATED BLOOD PRESSURE READING: ICD-10-CM

## 2017-05-03 DIAGNOSIS — T50.905D MEDICATION SIDE EFFECTS, SUBSEQUENT ENCOUNTER: ICD-10-CM

## 2017-05-03 DIAGNOSIS — R00.2 PALPITATIONS: ICD-10-CM

## 2017-05-03 DIAGNOSIS — I10 ESSENTIAL HYPERTENSION: Primary | ICD-10-CM

## 2017-05-03 PROBLEM — T88.7XXA MEDICATION SIDE EFFECTS: Status: ACTIVE | Noted: 2017-05-03

## 2017-05-03 PROCEDURE — 1159F MED LIST DOCD IN RCRD: CPT | Mod: S$GLB,,, | Performed by: FAMILY MEDICINE

## 2017-05-03 PROCEDURE — 99999 PR PBB SHADOW E&M-EST. PATIENT-LVL III: CPT | Mod: 25,PBBFAC,, | Performed by: FAMILY MEDICINE

## 2017-05-03 PROCEDURE — 74000 XR ABDOMEN AP 1 VIEW: CPT | Mod: 26,,, | Performed by: RADIOLOGY

## 2017-05-03 PROCEDURE — 99214 OFFICE O/P EST MOD 30 MIN: CPT | Mod: S$GLB,,, | Performed by: FAMILY MEDICINE

## 2017-05-03 PROCEDURE — 3078F DIAST BP <80 MM HG: CPT | Mod: S$GLB,,, | Performed by: FAMILY MEDICINE

## 2017-05-03 PROCEDURE — 1125F AMNT PAIN NOTED PAIN PRSNT: CPT | Mod: S$GLB,,, | Performed by: FAMILY MEDICINE

## 2017-05-03 PROCEDURE — 99499 UNLISTED E&M SERVICE: CPT | Mod: S$GLB,,, | Performed by: FAMILY MEDICINE

## 2017-05-03 PROCEDURE — 74000 XR ABDOMEN AP 1 VIEW: CPT | Mod: TC,PO

## 2017-05-03 PROCEDURE — 3077F SYST BP >= 140 MM HG: CPT | Mod: S$GLB,,, | Performed by: FAMILY MEDICINE

## 2017-05-03 PROCEDURE — 1160F RVW MEDS BY RX/DR IN RCRD: CPT | Mod: S$GLB,,, | Performed by: FAMILY MEDICINE

## 2017-05-03 RX ORDER — HYDROCHLOROTHIAZIDE 25 MG/1
25 TABLET ORAL 2 TIMES DAILY
Qty: 60 TABLET | Refills: 2 | Status: SHIPPED | OUTPATIENT
Start: 2017-05-03 | End: 2017-06-19 | Stop reason: ALTCHOICE

## 2017-05-03 RX ORDER — CARVEDILOL 25 MG/1
25 TABLET ORAL 2 TIMES DAILY WITH MEALS
Qty: 60 TABLET | Refills: 2 | Status: ON HOLD | OUTPATIENT
Start: 2017-05-03 | End: 2017-05-16 | Stop reason: HOSPADM

## 2017-05-03 RX ORDER — ACETAMINOPHEN 500 MG
500 TABLET ORAL NIGHTLY PRN
COMMUNITY

## 2017-05-03 RX ORDER — METOPROLOL SUCCINATE 25 MG/1
25 TABLET, EXTENDED RELEASE ORAL 2 TIMES DAILY
Qty: 60 TABLET | Refills: 6
Start: 2017-05-03 | End: 2017-05-23 | Stop reason: SDUPTHER

## 2017-05-03 NOTE — PROGRESS NOTES
Subjective:       Patient ID: Shirley Metz is a 84 y.o. female.    Chief Complaint: upset stomach    HPI Comments: She reports that she is having side effects from her current blood pressure medications.  She is on metoprolol XL 25 mg 2 a day, Diovan HCTZ 160-/25 one half a day, clonidine 0.1 mg twice a day if needed.  She reports that she has increased dry eyes, increased dry mouth,myalgia, weakness, increased joint pain, initial diarrhea and nowconstipation.  She has had ALLERGY reactions or side effects from Isordil, Norvasc, Apresoline, Lasix.  She denies any significant headache, chest pain, palpitations, shortness of breath.  She denies using nicotine products.  She denies excess alcohol intake.  She denies sleep apnea.  She reports her sleep was witnessed and she did not quit breathing during sleep hours.  She tried this sleep study in the past but this was discontinued because she could not fall asleep.    Review of Systems   Constitutional: Negative for chills and fever.   HENT: Negative for congestion.    Eyes: Negative for photophobia, pain, discharge, redness and itching.        Dry eyes   Respiratory: Negative for cough, chest tightness, shortness of breath and wheezing.    Cardiovascular: Negative for chest pain, palpitations and leg swelling.        Denies lightheadedness or syncope   Gastrointestinal: Positive for constipation and diarrhea. Negative for abdominal distention, blood in stool and nausea.   Neurological: Positive for weakness. Negative for dizziness, syncope, light-headedness and headaches.       Objective:      Physical Exam   Constitutional: She is oriented to person, place, and time. She appears well-developed and well-nourished. No distress.   Eyes: Conjunctivae are normal.   Neck: Neck supple. No JVD present. No thyromegaly present.   Cardiovascular: Normal rate, regular rhythm and normal heart sounds.    No murmur heard.  Pulmonary/Chest: Effort normal and breath sounds  normal. No respiratory distress. She has no wheezes. She has no rales.   Abdominal: Soft. Bowel sounds are normal. She exhibits no distension and no mass. There is no tenderness. There is no guarding.   Lymphadenopathy:     She has no cervical adenopathy.   Neurological: She is alert and oriented to person, place, and time. She exhibits normal muscle tone. Coordination normal.   Skin: She is not diaphoretic.       Office Visit on 04/17/2017   Component Date Value Ref Range Status    Color, UA 04/17/2017 givens yellow   Final    Spec Grav UA 04/17/2017 1.020   Final    pH, UA 04/17/2017 5   Final    WBC, UA 04/17/2017 +   Final    Nitrite, UA 04/17/2017 -   Final    Protein 04/17/2017 trace   Final    Glucose, UA 04/17/2017 normal   Final    Ketones, UA 04/17/2017 -   Final    Urobilinogen, UA 04/17/2017 normal   Final    Bilirubin 04/17/2017 -   Final    Blood, UA 04/17/2017 -   Final    Urine Culture, Routine 04/17/2017 Multiple organisms isolated. None in predominance.  Repeat if   Final    Urine Culture, Routine 04/17/2017 clinically necessary.   Final     Assessment:       1. Essential hypertension    2. Constipation, chronic        Plan:     KUB for stool evaluation.  We will attempt to adjust her medications.  Continue Toprol XL 25 mg taken twice a day, start Coreg 25 mg twice a day, HCTZ 25 mg twice a day.  use clonidine 0.1 mg twice a day if systolic blood pressure 170/80.  Discontinue Diovan HCTZ for now. followup in  two-weeks as planned with Dr Terrell.  She is to monitor blood pressure and pulse  twice a day at home.if pulse less than 60 hold metoprolol dose .  Regards constipation she has tried milk of magnesia, Dulcolax, MiraLAX.  Recommend one bottle mag citrate  followed by fleets enema if needed.  Essential hypertension  -     hydrochlorothiazide (HYDRODIURIL) 25 MG tablet; Take 1 tablet (25 mg total) by mouth 2 (two) times daily.  Dispense: 60 tablet; Refill: 2  -     carvedilol  (COREG) 25 MG tablet; Take 1 tablet (25 mg total) by mouth 2 (two) times daily with meals.  Dispense: 60 tablet; Refill: 2    Constipation, chronic  -     X-Ray Abdomen AP 1 View; Future; Expected date: 5/3/17

## 2017-05-03 NOTE — TELEPHONE ENCOUNTER
----- Message from Daiana Durán sent at 5/3/2017  7:31 AM CDT -----  Contact: pt   Pt says she may have a blocked bowel, dry mouth, upset stomach, and she would like to speak with someone in the office,, please call pt back [phone]

## 2017-05-04 ENCOUNTER — TELEPHONE (OUTPATIENT)
Dept: INTERNAL MEDICINE | Facility: CLINIC | Age: 82
End: 2017-05-04

## 2017-05-04 NOTE — TELEPHONE ENCOUNTER
----- Message from Analilia Howard sent at 5/4/2017  4:15 PM CDT -----  Contact: patient  Calling concerning having elevated BP, headaches and bowel issues. Please call patient  ASAP @ [phone]. Thanks, guy

## 2017-05-04 NOTE — TELEPHONE ENCOUNTER
Pt was calling in because she has been having bowel issues (since about the 17th), headaches and elevated blood pressure. Pt states that she had been having episodes of dry mouth now. Pt states that she tok medication for the diarrhea and now is constipated. Pt came in to see Dr. Montejo and states that she took an X-ray and was told that she had no blockages and told to take magnesium citrate. She states that she had been having bowel movements, but it is just liquid that she is passing. She also states that she is experiencing some shortness of breath no and is having frequent urination now. Pt states that when she last took her BP it was 209/87.      Please Advise

## 2017-05-05 NOTE — TELEPHONE ENCOUNTER
Tried calling pt, her phone just rang and there was no option for voicemail. I will try and call again

## 2017-05-05 NOTE — TELEPHONE ENCOUNTER
Please call this patient to check on her.  Appears that she had called last night after  had left the day.  If she is still having problems she needs to be seen at urgent care tonight or tomorrow/Sunday.

## 2017-05-05 NOTE — TELEPHONE ENCOUNTER
She states last night she had bad cramps lower ab and took two dulcolax and had bowel movements this AM and feels much better. BP running 155/60 now. She states her SOB is better today and still with lots of urination. She has not yet started the new carvedilol or HCTZ 25 BID.    I recommended that she take just one of the HCTA 25 daily as she is already having excessive urination. Also will have her start out with carvedilol 12.5 BID (take 1/2 the 25) to get her started due to her sensitivity to meds. Rec she see me in 2 weeks. She sts she will try for a sooner atppt than the 29th.

## 2017-05-11 ENCOUNTER — OFFICE VISIT (OUTPATIENT)
Dept: INTERNAL MEDICINE | Facility: CLINIC | Age: 82
DRG: 305 | End: 2017-05-11
Payer: MEDICARE

## 2017-05-11 ENCOUNTER — TELEPHONE (OUTPATIENT)
Dept: INTERNAL MEDICINE | Facility: CLINIC | Age: 82
End: 2017-05-11

## 2017-05-11 VITALS
DIASTOLIC BLOOD PRESSURE: 69 MMHG | OXYGEN SATURATION: 95 % | WEIGHT: 182.88 LBS | BODY MASS INDEX: 31.39 KG/M2 | HEART RATE: 66 BPM | TEMPERATURE: 98 F | SYSTOLIC BLOOD PRESSURE: 148 MMHG

## 2017-05-11 DIAGNOSIS — M19.071 OSTEOARTHRITIS OF JOINTS OF TOES OF BOTH FEET: ICD-10-CM

## 2017-05-11 DIAGNOSIS — I10 ESSENTIAL HYPERTENSION: Primary | ICD-10-CM

## 2017-05-11 DIAGNOSIS — M79.10 MYALGIA: ICD-10-CM

## 2017-05-11 DIAGNOSIS — M19.072 OSTEOARTHRITIS OF JOINTS OF TOES OF BOTH FEET: ICD-10-CM

## 2017-05-11 DIAGNOSIS — T50.905D MEDICATION SIDE EFFECTS, SUBSEQUENT ENCOUNTER: ICD-10-CM

## 2017-05-11 PROCEDURE — 99999 PR PBB SHADOW E&M-EST. PATIENT-LVL III: CPT | Mod: PBBFAC,,, | Performed by: FAMILY MEDICINE

## 2017-05-11 PROCEDURE — 99499 UNLISTED E&M SERVICE: CPT | Mod: S$GLB,,, | Performed by: FAMILY MEDICINE

## 2017-05-11 PROCEDURE — 1159F MED LIST DOCD IN RCRD: CPT | Mod: S$GLB,,, | Performed by: FAMILY MEDICINE

## 2017-05-11 PROCEDURE — 3077F SYST BP >= 140 MM HG: CPT | Mod: S$GLB,,, | Performed by: FAMILY MEDICINE

## 2017-05-11 PROCEDURE — 99214 OFFICE O/P EST MOD 30 MIN: CPT | Mod: S$GLB,,, | Performed by: FAMILY MEDICINE

## 2017-05-11 PROCEDURE — 3078F DIAST BP <80 MM HG: CPT | Mod: S$GLB,,, | Performed by: FAMILY MEDICINE

## 2017-05-11 PROCEDURE — 1157F ADVNC CARE PLAN IN RCRD: CPT | Mod: S$GLB,,, | Performed by: FAMILY MEDICINE

## 2017-05-11 PROCEDURE — 1125F AMNT PAIN NOTED PAIN PRSNT: CPT | Mod: S$GLB,,, | Performed by: FAMILY MEDICINE

## 2017-05-11 PROCEDURE — 1160F RVW MEDS BY RX/DR IN RCRD: CPT | Mod: S$GLB,,, | Performed by: FAMILY MEDICINE

## 2017-05-11 NOTE — TELEPHONE ENCOUNTER
----- Message from Sofai Limon sent at 5/11/2017  7:12 AM CDT -----  Contact: Pt  Pt is requesting to speak to the nurse regarding some issues that she's having with her blood pressure medication. Pt states that her blood pressure has been running high, and that she had to call the paramedics last night. Pt states that her bp reading for this morning was 180/69.  Pls call pt back at [phone] or [phone].

## 2017-05-11 NOTE — PATIENT INSTRUCTIONS
No med changes over the weekend. Continue current meds.  Starting Monday 5/15, if PM BPs still running high, you may start taking 1/2 the HCTZ 25mg once at midday.    See me for recheck on 5/22 as scheduled

## 2017-05-11 NOTE — MR AVS SNAPSHOT
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     Registered nurses in the [facility] provide: appointment scheduling, clinical advisement, health education, and other advisory services.  Call: [phone] (toll free)               Medications           Message regarding Medications     Verify the changes and/or additions to your medication regime listed below are the same as discussed with your clinician today.  If any of these changes or additions are incorrect, please notify your healthcare provider.             Verify that the below list of medications is an accurate representation of the medications you are currently taking.  If none reported, the list may be blank. If incorrect, please contact your healthcare provider. Carry this list with you in case of emergency.           Current Medications     acetaminophen (TYLENOL) 500 MG tablet Take 500 mg by mouth once daily at 6am.     carvedilol (COREG) 25 MG tablet Take 1 tablet (25 mg total) by mouth 2 (two) times daily with meals.    cloNIDine (CATAPRES) 0.1 MG tablet Take 1 tablet (0.1 mg total) by mouth 2 (two) times daily as needed (BP greater than 160/90).    loteprednol (LOTEMAX) 0.5 % ophthalmic suspension 1 drop once daily. One drop in the left eye and 1 drops in the right eye    metoprolol succinate (TOPROL-XL) 25 MG 24 hr tablet Take 1 tablet (25 mg total) by mouth 2 (two) times daily.    diphenhydramine-acetaminophen (TYLENOL PM)  mg Tab Take 1 tablet by mouth nightly as needed.    hydrochlorothiazide (HYDRODIURIL) 25 MG tablet Take 1 tablet (25 mg total) by mouth 2 (two) times daily.           Clinical Reference Information           Your Vitals Were     BP Pulse Temp Weight SpO2 BMI    148/69 (BP Location: Right arm, Patient Position: Sitting, BP Method: Automatic) 66 98 °F (36.7 °C) (Tympanic) 83 kg (182 lb 14 oz) 95% 31.39 kg/m2      Blood Pressure          Most Recent Value    BP  (!)  148/69      Allergies as of 5/11/2017     Claritin [Loratadine]    Hydrocodone-acetaminophen    Naproxen    Vibramycin [Doxycycline Calcium]    Amlodipine    Fenofibrate    Hydralazine Analogues    Isosorbide    Lasix [Furosemide]    Moxifloxacin    Timolol    Allegra [Fexofenadine]    Codeine    Erythromycin    Hydrocortisone (Bulk)    Macrolide Antibiotics    Patanol [Olopatadine]    Prednisone    Statins-hmg-coa Reductase Inhibitors    Xalatan [Latanoprost]      Immunizations Administered on Date of Encounter - 5/11/2017     None      Instructions    No med changes over the weekend. Continue current meds.  Starting Monday 5/15, if PM BPs still running high, you may start taking 1/2 the HCTZ 25mg once at midday.    See me for recheck on 5/22 as scheduled       Language Assistance Services     ATTENTION: Language assistance services are available, free of charge. Please call [phone].      ATENCIÓN: fareed Melgar  disposición servicios gratuitos de asistencia lingüística. Matt al [phone].     WANDER Ý: N?u b?n nói Ti?ng Vi?t, có các d?ch v? h? tr? ngôn ng? mi?n phí dành cho b?n. G?i s? [phone].         [facility] complies with applicable Federal civil rights laws and does not discriminate on the basis of race, color, national origin, age, disability, or sex.

## 2017-05-11 NOTE — PROGRESS NOTES
"Subjective:       Patient ID: Shirley Metz is a 84 y.o. female.    Chief Complaint: HBP Readings (have been high lately, pt had to call the ambulance on lastnight 5/10/17) and medication adjustments    HPI Comments: She started taking 1/2 the carvedilol 25 BID, 1/2 the clonidine 0.1 BID, and taking metoprolol 25 BID Saturday 5/6 as discussed by phone 5/5. She states her BPs were running in the 150-170s =- she brings in detailed records of her readings. She would check BPs and take the "extra" clonidine in PM based on her HS reading. It was taken nightly since I spoke with her 5/5. Yesterday morning the blood pressure was 121/48 and she felt weak.  She took no meds and it went back up to 137/56 in a few hours. Then last evening she took a second clonidine after her blood pressure was 200/76 at 10 PM.  Awoke feeling bad at around midnight and her blood pressure 214/94 at 12:30 and called EMS.  She was checked out and all appeared okay, blood pressure started to come down.  She was asked if she wished to the hospital but she chose not to at that time.    She has questions about whether the valsartan is causing nasal congestion - it usually responds to echinacea, she sts, but has not responded.  She is had dry mouth/eyes, arthritis flareup and wrist/hand/finger/left shoulder.  States she is not getting much sleep due to nasal stuffiness.    Review of Systems   HENT: Positive for congestion. Negative for postnasal drip, rhinorrhea and sneezing.    Respiratory: Positive for shortness of breath. Negative for cough and chest tightness.    Cardiovascular: Leg swelling: no leg sweilling but she had B foot swelling since last night.   Gastrointestinal: Positive for diarrhea.   Musculoskeletal: Positive for arthralgias, gait problem, joint swelling, myalgias (swelling to right wrist - ) and neck pain (hurrts to touch a spot on her left neck below jaw angle.).       Objective:      Physical Exam   Constitutional: She " is oriented to person, place, and time. She appears well-developed.   HENT:   Head: Normocephalic and atraumatic.   Cardiovascular: Normal rate, regular rhythm and normal heart sounds.    Pulmonary/Chest: Effort normal and breath sounds normal.   Neurological: She is alert and oriented to person, place, and time.   Skin: Skin is warm and dry.   Psychiatric: She has a normal mood and affect. Her behavior is normal.         Assessment/Plan:     1. Essential hypertension     2. 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 Medication side effects, subsequent encounter     4. Myalgia     More than 50% of visit was spent in counseling regarding options, recommendations, medication use, side effects, expectations, and precautions.  Total time spent face-to-face was 40 minutes.  Reviewed that not taking her meds yesterday AM would have contributed to the high BPs last night.  She will make appt with rheum for wrists, etc.  Pt instructioins: No med changes over the weekend. Continue current meds.  Starting Monday 5/15, if PM BPs still running high, you may start taking 1/2 the HCTZ 25mg once at midday.  See me for recheck on 5/22 as scheduled.

## 2017-05-11 NOTE — TELEPHONE ENCOUNTER
Pt was calling in regarding her blood pressure being high. Pt says that she called the paramedics last night due to her HBP. Her blood pressure this morning was 180/69. Made pt an appointment to be seen on today for 10:40am

## 2017-05-13 ENCOUNTER — NURSE TRIAGE (OUTPATIENT)
Dept: ADMINISTRATIVE | Facility: CLINIC | Age: 82
End: 2017-05-13

## 2017-05-13 ENCOUNTER — HOSPITAL ENCOUNTER (INPATIENT)
Facility: HOSPITAL | Age: 82
LOS: 3 days | Discharge: HOME | DRG: 305 | End: 2017-05-16
Attending: EMERGENCY MEDICINE | Admitting: INTERNAL MEDICINE
Payer: MEDICARE

## 2017-05-13 DIAGNOSIS — I16.1 HYPERTENSIVE EMERGENCY: ICD-10-CM

## 2017-05-13 DIAGNOSIS — I51.7 CARDIOMEGALY: ICD-10-CM

## 2017-05-13 DIAGNOSIS — R00.2 PALPITATIONS: ICD-10-CM

## 2017-05-13 DIAGNOSIS — R06.02 SHORTNESS OF BREATH: ICD-10-CM

## 2017-05-13 DIAGNOSIS — K59.09 CONSTIPATION, CHRONIC: ICD-10-CM

## 2017-05-13 DIAGNOSIS — R79.89 ELEVATED TROPONIN I LEVEL: Primary | ICD-10-CM

## 2017-05-13 DIAGNOSIS — R94.31 ABNORMAL ECG: ICD-10-CM

## 2017-05-13 DIAGNOSIS — H35.30 MACULAR DEGENERATION: Chronic | ICD-10-CM

## 2017-05-13 DIAGNOSIS — G62.9 NEUROPATHY: ICD-10-CM

## 2017-05-13 DIAGNOSIS — Z96.1 LENS REPLACED BY OTHER MEANS: ICD-10-CM

## 2017-05-13 DIAGNOSIS — R07.9 CHEST PAIN: ICD-10-CM

## 2017-05-13 DIAGNOSIS — N18.30 CHRONIC KIDNEY DISEASE, STAGE 3: ICD-10-CM

## 2017-05-13 DIAGNOSIS — R03.0 ELEVATED BLOOD PRESSURE READING: ICD-10-CM

## 2017-05-13 DIAGNOSIS — G72.9 MYOPATHY: ICD-10-CM

## 2017-05-13 DIAGNOSIS — I16.0 HYPERTENSIVE URGENCY: ICD-10-CM

## 2017-05-13 DIAGNOSIS — I49.3 PVC'S (PREMATURE VENTRICULAR CONTRACTIONS): ICD-10-CM

## 2017-05-13 DIAGNOSIS — H40.1190 COAG (CHRONIC OPEN-ANGLE GLAUCOMA): ICD-10-CM

## 2017-05-13 DIAGNOSIS — M79.10 MYALGIA: ICD-10-CM

## 2017-05-13 DIAGNOSIS — H01.009 BLEPHARITIS, UNSPECIFIED: ICD-10-CM

## 2017-05-13 DIAGNOSIS — I70.0 CALCIFICATION OF AORTA: Chronic | ICD-10-CM

## 2017-05-13 LAB
ALBUMIN SERPL BCP-MCNC: 3.9 G/DL
ALP SERPL-CCNC: 62 U/L
ALT SERPL W/O P-5'-P-CCNC: 26 U/L
ANION GAP SERPL CALC-SCNC: 10 MMOL/L
AST SERPL-CCNC: 21 U/L
BASOPHILS # BLD AUTO: 0.02 K/UL
BASOPHILS NFR BLD: 0.3 %
BILIRUB SERPL-MCNC: 0.3 MG/DL
BNP SERPL-MCNC: 430 PG/ML
BUN SERPL-MCNC: 25 MG/DL
CALCIUM SERPL-MCNC: 9.3 MG/DL
CHLORIDE SERPL-SCNC: 109 MMOL/L
CK MB SERPL-MCNC: 1.1 NG/ML
CK MB SERPL-RTO: 2 %
CK SERPL-CCNC: 56 U/L
CK SERPL-CCNC: 56 U/L
CO2 SERPL-SCNC: 23 MMOL/L
CREAT SERPL-MCNC: 1.2 MG/DL
DIFFERENTIAL METHOD: ABNORMAL
EOSINOPHIL # BLD AUTO: 0.1 K/UL
EOSINOPHIL NFR BLD: 2.2 %
ERYTHROCYTE [DISTWIDTH] IN BLOOD BY AUTOMATED COUNT: 13.7 %
EST. GFR  (AFRICAN AMERICAN): 48 ML/MIN/1.73 M^2
EST. GFR  (NON AFRICAN AMERICAN): 42 ML/MIN/1.73 M^2
GLUCOSE SERPL-MCNC: 99 MG/DL
HCT VFR BLD AUTO: 36.2 %
HGB BLD-MCNC: 12.2 G/DL
LYMPHOCYTES # BLD AUTO: 1.3 K/UL
LYMPHOCYTES NFR BLD: 21.4 %
MCH RBC QN AUTO: 32.4 PG
MCHC RBC AUTO-ENTMCNC: 33.7 %
MCV RBC AUTO: 96 FL
MONOCYTES # BLD AUTO: 0.7 K/UL
MONOCYTES NFR BLD: 12.1 %
NEUTROPHILS # BLD AUTO: 3.9 K/UL
NEUTROPHILS NFR BLD: 64 %
PLATELET # BLD AUTO: 190 K/UL
PMV BLD AUTO: 9.6 FL
POTASSIUM SERPL-SCNC: 4 MMOL/L
PROT SERPL-MCNC: 7.2 G/DL
RBC # BLD AUTO: 3.77 M/UL
SODIUM SERPL-SCNC: 142 MMOL/L
TROPONIN I SERPL DL<=0.01 NG/ML-MCNC: 0.03 NG/ML
TROPONIN I SERPL DL<=0.01 NG/ML-MCNC: 0.04 NG/ML
WBC # BLD AUTO: 6.04 K/UL

## 2017-05-13 PROCEDURE — 84484 ASSAY OF TROPONIN QUANT: CPT

## 2017-05-13 PROCEDURE — 93005 ELECTROCARDIOGRAM TRACING: CPT

## 2017-05-13 PROCEDURE — 84484 ASSAY OF TROPONIN QUANT: CPT | Mod: 91

## 2017-05-13 PROCEDURE — 63600175 PHARM REV CODE 636 W HCPCS: Performed by: INTERNAL MEDICINE

## 2017-05-13 PROCEDURE — 80053 COMPREHEN METABOLIC PANEL: CPT

## 2017-05-13 PROCEDURE — 83880 ASSAY OF NATRIURETIC PEPTIDE: CPT

## 2017-05-13 PROCEDURE — 25000003 PHARM REV CODE 250: Performed by: INTERNAL MEDICINE

## 2017-05-13 PROCEDURE — 99285 EMERGENCY DEPT VISIT HI MDM: CPT | Mod: 25

## 2017-05-13 PROCEDURE — 93010 ELECTROCARDIOGRAM REPORT: CPT | Mod: ,,, | Performed by: INTERNAL MEDICINE

## 2017-05-13 PROCEDURE — 82553 CREATINE MB FRACTION: CPT

## 2017-05-13 PROCEDURE — 21400001 HC TELEMETRY ROOM

## 2017-05-13 PROCEDURE — 25000003 PHARM REV CODE 250: Performed by: EMERGENCY MEDICINE

## 2017-05-13 PROCEDURE — 85025 COMPLETE CBC W/AUTO DIFF WBC: CPT

## 2017-05-13 RX ORDER — IPRATROPIUM BROMIDE AND ALBUTEROL SULFATE 2.5; .5 MG/3ML; MG/3ML
3 SOLUTION RESPIRATORY (INHALATION) EVERY 4 HOURS PRN
Status: DISCONTINUED | OUTPATIENT
Start: 2017-05-13 | End: 2017-05-16 | Stop reason: HOSPADM

## 2017-05-13 RX ORDER — CARVEDILOL 12.5 MG/1
12.5 TABLET ORAL 2 TIMES DAILY WITH MEALS
Status: DISCONTINUED | OUTPATIENT
Start: 2017-05-14 | End: 2017-05-13

## 2017-05-13 RX ORDER — HYDRALAZINE HYDROCHLORIDE 20 MG/ML
10 INJECTION INTRAMUSCULAR; INTRAVENOUS EVERY 8 HOURS PRN
Status: DISCONTINUED | OUTPATIENT
Start: 2017-05-13 | End: 2017-05-16 | Stop reason: HOSPADM

## 2017-05-13 RX ORDER — LOSARTAN POTASSIUM 25 MG/1
25 TABLET ORAL DAILY
Status: DISCONTINUED | OUTPATIENT
Start: 2017-05-13 | End: 2017-05-14

## 2017-05-13 RX ORDER — METOPROLOL SUCCINATE 25 MG/1
25 TABLET, EXTENDED RELEASE ORAL DAILY
Status: DISCONTINUED | OUTPATIENT
Start: 2017-05-14 | End: 2017-05-14

## 2017-05-13 RX ORDER — FAMOTIDINE 20 MG/1
20 TABLET, FILM COATED ORAL 2 TIMES DAILY
Status: DISCONTINUED | OUTPATIENT
Start: 2017-05-13 | End: 2017-05-13 | Stop reason: SDUPTHER

## 2017-05-13 RX ORDER — HYDROCHLOROTHIAZIDE 25 MG/1
25 TABLET ORAL DAILY
Status: DISCONTINUED | OUTPATIENT
Start: 2017-05-14 | End: 2017-05-16 | Stop reason: HOSPADM

## 2017-05-13 RX ORDER — ACETAMINOPHEN 325 MG/1
650 TABLET ORAL EVERY 8 HOURS PRN
Status: DISCONTINUED | OUTPATIENT
Start: 2017-05-13 | End: 2017-05-16 | Stop reason: HOSPADM

## 2017-05-13 RX ORDER — LOTEPREDNOL ETABONATE 5 MG/G
1 GEL OPHTHALMIC DAILY
Status: DISCONTINUED | OUTPATIENT
Start: 2017-05-14 | End: 2017-05-16 | Stop reason: HOSPADM

## 2017-05-13 RX ORDER — ACETAMINOPHEN 325 MG/1
650 TABLET ORAL EVERY 8 HOURS PRN
Status: DISCONTINUED | OUTPATIENT
Start: 2017-05-13 | End: 2017-05-13

## 2017-05-13 RX ORDER — FAMOTIDINE 20 MG/1
20 TABLET, FILM COATED ORAL 2 TIMES DAILY
Status: DISCONTINUED | OUTPATIENT
Start: 2017-05-13 | End: 2017-05-15

## 2017-05-13 RX ORDER — ENOXAPARIN SODIUM 100 MG/ML
40 INJECTION SUBCUTANEOUS EVERY 24 HOURS
Status: DISCONTINUED | OUTPATIENT
Start: 2017-05-13 | End: 2017-05-16 | Stop reason: HOSPADM

## 2017-05-13 RX ORDER — ONDANSETRON 2 MG/ML
4 INJECTION INTRAMUSCULAR; INTRAVENOUS EVERY 12 HOURS PRN
Status: DISCONTINUED | OUTPATIENT
Start: 2017-05-13 | End: 2017-05-13

## 2017-05-13 RX ORDER — DIPHENHYDRAMINE HCL 25 MG
25 CAPSULE ORAL EVERY 6 HOURS PRN
Status: DISCONTINUED | OUTPATIENT
Start: 2017-05-13 | End: 2017-05-16 | Stop reason: HOSPADM

## 2017-05-13 RX ORDER — ONDANSETRON 2 MG/ML
4 INJECTION INTRAMUSCULAR; INTRAVENOUS EVERY 8 HOURS PRN
Status: DISCONTINUED | OUTPATIENT
Start: 2017-05-13 | End: 2017-05-16 | Stop reason: HOSPADM

## 2017-05-13 RX ORDER — MAG HYDROX/ALUMINUM HYD/SIMETH 200-200-20
30 SUSPENSION, ORAL (FINAL DOSE FORM) ORAL EVERY 6 HOURS PRN
Status: DISCONTINUED | OUTPATIENT
Start: 2017-05-13 | End: 2017-05-16 | Stop reason: HOSPADM

## 2017-05-13 RX ORDER — GUAIFENESIN 100 MG/5ML
200 SOLUTION ORAL EVERY 4 HOURS PRN
Status: DISCONTINUED | OUTPATIENT
Start: 2017-05-13 | End: 2017-05-16 | Stop reason: HOSPADM

## 2017-05-13 RX ORDER — CLONIDINE HYDROCHLORIDE 0.1 MG/1
0.1 TABLET ORAL 2 TIMES DAILY PRN
Status: DISCONTINUED | OUTPATIENT
Start: 2017-05-13 | End: 2017-05-16 | Stop reason: HOSPADM

## 2017-05-13 RX ADMIN — LOSARTAN POTASSIUM 25 MG: 25 TABLET, FILM COATED ORAL at 10:05

## 2017-05-13 RX ADMIN — CLONIDINE HYDROCHLORIDE 0.1 MG: 0.1 TABLET ORAL at 11:05

## 2017-05-13 RX ADMIN — ENOXAPARIN SODIUM 40 MG: 100 INJECTION SUBCUTANEOUS at 10:05

## 2017-05-13 RX ADMIN — FAMOTIDINE 20 MG: 20 TABLET ORAL at 10:05

## 2017-05-13 RX ADMIN — ACETAMINOPHEN 650 MG: 325 TABLET ORAL at 11:05

## 2017-05-13 NOTE — ED PROVIDER NOTES
"SCRIBE #1 NOTE: I, Mejia Boles, am scribing for, and in the presence of, Chad Lima NP. I have scribed the entire note.   SCRIBE #1 NOTE: I, Darell Franklin, am scribing for, and in the presence of,  Chester Graham MD. I have scribed the following portions of the note - the EKG reading. Other sections scribed: ED discussion.   SCRIBE #2 NOTE: I, Adilene Dobson, am scribing for, and in the presence of,  Usman Mendiola MD. I have scribed the remaining portions of the note not scribed by Scribe #1.     History      Chief Complaint   Patient presents with    Medication Reaction     pt on coreg x 1 week c/o sob with "swollen lips and difficulty walking" after starting medication.  pt appears asymtomatic at this time       Review of patient's allergies indicates:   Allergen Reactions    Claritin [loratadine] Other (See Comments)     Double vision/spots    Hydrocodone-acetaminophen      wheezing    Naproxen      wheezing    Vibramycin [doxycycline calcium] Other (See Comments)     Weakness nausea   sob    Amlodipine      Myalgias      Fenofibrate      Causes muscle weakness    Hydralazine analogues      Muscle tremors/aches      Isosorbide     Lasix [furosemide]      myalgias    Moxifloxacin Other (See Comments)     Hives   muscle soreness    Timolol     Allegra [fexofenadine] Other (See Comments)     Double vision/spots     Codeine Diarrhea and Other (See Comments)     Loss of balance     Erythromycin Other (See Comments)     Complete weakness/could not walk    Hydrocortisone (bulk) Other (See Comments)     Only suppository/ caused muscle weakness    Macrolide antibiotics Other (See Comments)     Complete weakness/could not walk    Patanol [olopatadine] Other (See Comments)     Muscle weakness    Prednisone Anxiety    Statins-hmg-coa reductase inhibitors Other (See Comments)     Muscle weakness    Xalatan [latanoprost] Other (See Comments)     Muscle weakness        HPI   HPI    5/13/2017, 4:36 PM   History " obtained from the patient      History of Present Illness: Shirley Metz is a 84 y.o. female patient who presents to the Emergency Department for an evaluation of a medication reaction which onset gradually x1 weeks ago. Symptoms are constant and moderate in severity. Pt states she was put on coreg a few weeks ago which she believes she may be having a reaction to. Pt states she has been suffering from mild SOB for x6 weeks but reprots her sx have worsened since starting her new medication. Pt is also c/o a dry mouth since starting coreg. Exacerbated by walking and lying down. Relieved by nothing. Associated sxs include SOB and dry mouth. Patient denies any fever, chills, diaphoresis, CP, cough, chest tightness, leg swelling, abdominal pain, n/v/d, HA, weakness, dysuria, hematuria, and all other sxs at this time. No further complaints or concerns at this time.     Arrival mode: Personal vehicle    PCP: Yandy Terrell MD       Past Medical History:  Past Medical History:   Diagnosis Date    Arthritis     Back pain     Fuchs' corneal dystrophy     Glaucoma     Hyperlipidemia     Hypertension     Macular degeneration dry    Obesity     Trouble in sleeping     Urinary tract infection        Past Surgical History:  Past Surgical History:   Procedure Laterality Date    ADENOIDECTOMY  1938    BREAST CYST EXCISION Left 1997 approx    CARPAL TUNNEL RELEASE Right 2012 approx    CATARACT EXTRACTION Bilateral 1994    CHOLECYSTECTOMY  1975    CORNEAL TRANSPLANT Bilateral 08/2015 8/2015 - left; Right 4/2016    EYE SURGERY      Fuch's Corneal dystrophy - had 2nd operation with Dr. Quintanilla    EYE SURGERY      Cataract wIOL    left thumb Left 2011    removed cuboid for arthritis    SLT- OS (aka TRABECULOPLASTY) Left 05/11/2011    repeat 3/14/12    TONSILLECTOMY  1938         Family History:  Family History   Problem Relation Age of Onset    Heart disease Mother     Hypertension Mother      Cancer Father 88     sarcoma( ?Kaposi's ) on leg    Deep vein thrombosis Neg Hx     Ovarian cancer Neg Hx     Breast cancer Neg Hx     Kidney disease Neg Hx     Strabismus Neg Hx     Retinal detachment Neg Hx     Macular degeneration Neg Hx     Glaucoma Neg Hx     Blindness Neg Hx     Amblyopia Neg Hx     Eczema Neg Hx     Lupus Neg Hx     Melanoma Neg Hx     Psoriasis Neg Hx     Diabetes Neg Hx     Stroke Neg Hx     Mental retardation Neg Hx     Mental illness Neg Hx     Hyperlipidemia Neg Hx     COPD Neg Hx     Asthma Neg Hx     Depression Neg Hx     Alcohol abuse Neg Hx     Drug abuse Neg Hx        Social History:  Social History     Social History Main Topics    Smoking status: Never Smoker    Smokeless tobacco: Never Used      Comment: history of passive smoking from her ex-    Alcohol use 1.2 oz/week     2 Standard drinks or equivalent per week      Comment: 2 scotch drinks a week.     Drug use: No    Sexual activity: Yes     Partners: Male      Comment: discussed protection for STD's       ROS   Review of Systems   Constitutional: Negative for chills, diaphoresis and fever.        (+) Dry mouth   HENT: Negative for sore throat and trouble swallowing.    Respiratory: Positive for shortness of breath. Negative for cough and chest tightness.    Cardiovascular: Negative for chest pain and leg swelling.   Gastrointestinal: Negative for abdominal pain, diarrhea, nausea and vomiting.   Genitourinary: Negative for decreased urine volume, difficulty urinating, dysuria and hematuria.   Musculoskeletal: Negative for back pain, neck pain and neck stiffness.   Skin: Negative for rash.   Neurological: Negative for dizziness, weakness, light-headedness and headaches.   Hematological: Does not bruise/bleed easily.     Physical Exam    Initial Vitals   BP Pulse Resp Temp SpO2   05/13/17 1620 05/13/17 1620 05/13/17 1620 05/13/17 1620 05/13/17 1620   183/74 69 20 98.1 °F (36.7 °C) 94 %       Physical Exam  Nursing Notes and Vital Signs Reviewed.  Constitutional: Patient is in no acute distress. Awake and alert. Well-developed and well-nourished.  Head: Atraumatic. Normocephalic.  Eyes: PERRL. EOM intact. Conjunctivae are not pale. No scleral icterus.  ENT: Mucous membranes are moist.    Neck: Supple. Full ROM. No lymphadenopathy.  Cardiovascular: Regular rate. Regular rhythm. No murmurs, rubs, or gallops. Distal pulses are 2+ and symmetric.  Pulmonary/Chest: No respiratory distress. Clear to auscultation bilaterally. No wheezing, rales, or rhonchi.  Abdominal: Soft and non-distended.  There is no tenderness.  No rebound, guarding, or rigidity. Good bowel sounds.  Genitourinary: No CVA tenderness  Musculoskeletal: Moves all extremities. No obvious deformities. No edema. No calf tenderness.  Skin: Warm and dry.  Neurological:  Alert, awake, and appropriate.  Normal speech.  No acute focal neurological deficits are appreciated.  Psychiatric: Normal affect. Good eye contact. Appropriate in content.    ED Course    Procedures  ED Vital Signs:  Vitals:    05/15/17 0100 05/15/17 0300 05/15/17 0424 05/15/17 0500   BP:   (!) 143/67    Pulse: 82 68 74 68   Resp:   18    Temp:   98.9 °F (37.2 °C)    TempSrc:   Oral    SpO2:   (!) 94%    Weight:       Height:        05/15/17 0816 05/15/17 1131 05/15/17 1600 05/15/17 1915   BP: (!) 164/71 (!) 143/60 (!) 148/78 134/74   Pulse: 76 72 75 78   Resp: 18 19 18 16   Temp: 99.3 °F (37.4 °C) 99.2 °F (37.3 °C) 98.5 °F (36.9 °C) 99.4 °F (37.4 °C)   TempSrc: Oral Oral Oral Oral   SpO2: 95% 95% 95% 95%   Weight:       Height:        05/16/17 0015 05/16/17 0416 05/16/17 0418 05/16/17 0430   BP: 134/68 (!) 185/80  (!) 162/76   Pulse: 74 75     Resp: 18 20     Temp: 98.5 °F (36.9 °C) 98.2 °F (36.8 °C)     TempSrc: Oral Oral     SpO2: 95% (!) 93%     Weight:   79.8 kg (176 lb)    Height:        05/16/17 0730 05/16/17 0917 05/16/17 1200   BP: (!) 178/78 (!) 150/78 (!) 164/75    Pulse: 68  79   Resp: 19  18   Temp: 99.1 °F (37.3 °C)  98.7 °F (37.1 °C)   TempSrc: Oral  Oral   SpO2: 95%  95%   Weight:      Height:          Abnormal Lab Results:  Labs Reviewed   CBC W/ AUTO DIFFERENTIAL - Abnormal; Notable for the following:        Result Value    RBC 3.77 (*)     Hematocrit 36.2 (*)     MCH 32.4 (*)     All other components within normal limits   COMPREHENSIVE METABOLIC PANEL - Abnormal; Notable for the following:     BUN, Bld 25 (*)     eGFR if  48 (*)     eGFR if non  42 (*)     All other components within normal limits   TROPONIN I - Abnormal; Notable for the following:     Troponin I 0.029 (*)     All other components within normal limits   B-TYPE NATRIURETIC PEPTIDE - Abnormal; Notable for the following:      (*)     All other components within normal limits   TROPONIN I - Abnormal; Notable for the following:     Troponin I 0.043 (*)     All other components within normal limits   CK   CK-MB        All Lab Results:  Results for orders placed or performed during the hospital encounter of 05/13/17   CBC auto differential   Result Value Ref Range    WBC 6.04 3.90 - 12.70 K/uL    RBC 3.77 (L) 4.00 - 5.40 M/uL    Hemoglobin 12.2 12.0 - 16.0 g/dL    Hematocrit 36.2 (L) 37.0 - 48.5 %    MCV 96 82 - 98 fL    MCH 32.4 (H) 27.0 - 31.0 pg    MCHC 33.7 32.0 - 36.0 %    RDW 13.7 11.5 - 14.5 %    Platelets 190 150 - 350 K/uL    MPV 9.6 9.2 - 12.9 fL    Gran # 3.9 1.8 - 7.7 K/uL    Lymph # 1.3 1.0 - 4.8 K/uL    Mono # 0.7 0.3 - 1.0 K/uL    Eos # 0.1 0.0 - 0.5 K/uL    Baso # 0.02 0.00 - 0.20 K/uL    Gran% 64.0 38.0 - 73.0 %    Lymph% 21.4 18.0 - 48.0 %    Mono% 12.1 4.0 - 15.0 %    Eosinophil% 2.2 0.0 - 8.0 %    Basophil% 0.3 0.0 - 1.9 %    Differential Method Automated    Comprehensive metabolic panel   Result Value Ref Range    Sodium 142 136 - 145 mmol/L    Potassium 4.0 3.5 - 5.1 mmol/L    Chloride 109 95 - 110 mmol/L    CO2 23 23 - 29 mmol/L    Glucose 99 70  - 110 mg/dL    BUN, Bld 25 (H) 8 - 23 mg/dL    Creatinine 1.2 0.5 - 1.4 mg/dL    Calcium 9.3 8.7 - 10.5 mg/dL    Total Protein 7.2 6.0 - 8.4 g/dL    Albumin 3.9 3.5 - 5.2 g/dL    Total Bilirubin 0.3 0.1 - 1.0 mg/dL    Alkaline Phosphatase 62 55 - 135 U/L    AST 21 10 - 40 U/L    ALT 26 10 - 44 U/L    Anion Gap 10 8 - 16 mmol/L    eGFR if African American 48 (A) >60 mL/min/1.73 m^2    eGFR if non African American 42 (A) >60 mL/min/1.73 m^2   CPK   Result Value Ref Range    CPK 56 20 - 180 U/L   CK-MB   Result Value Ref Range    CPK 56 20 - 180 U/L    CPK MB 1.1 0.1 - 6.5 ng/mL    MB% 2.0 0.0 - 5.0 %   Troponin I   Result Value Ref Range    Troponin I 0.029 (H) 0.000 - 0.026 ng/mL   Brain natriuretic peptide   Result Value Ref Range     (H) 0 - 99 pg/mL   Troponin I   Result Value Ref Range    Troponin I 0.043 (H) 0.000 - 0.026 ng/mL   Troponin I   Result Value Ref Range    Troponin I 0.041 (H) 0.000 - 0.026 ng/mL   CBC auto differential   Result Value Ref Range    WBC 7.59 3.90 - 12.70 K/uL    RBC 3.61 (L) 4.00 - 5.40 M/uL    Hemoglobin 11.7 (L) 12.0 - 16.0 g/dL    Hematocrit 34.8 (L) 37.0 - 48.5 %    MCV 96 82 - 98 fL    MCH 32.4 (H) 27.0 - 31.0 pg    MCHC 33.6 32.0 - 36.0 %    RDW 13.7 11.5 - 14.5 %    Platelets 194 150 - 350 K/uL    MPV 9.9 9.2 - 12.9 fL    Gran # 5.0 1.8 - 7.7 K/uL    Lymph # 1.6 1.0 - 4.8 K/uL    Mono # 0.9 0.3 - 1.0 K/uL    Eos # 0.1 0.0 - 0.5 K/uL    Baso # 0.03 0.00 - 0.20 K/uL    Gran% 65.8 38.0 - 73.0 %    Lymph% 21.2 18.0 - 48.0 %    Mono% 11.2 4.0 - 15.0 %    Eosinophil% 1.4 0.0 - 8.0 %    Basophil% 0.4 0.0 - 1.9 %    Differential Method Automated    Comprehensive metabolic panel   Result Value Ref Range    Sodium 141 136 - 145 mmol/L    Potassium 3.7 3.5 - 5.1 mmol/L    Chloride 108 95 - 110 mmol/L    CO2 25 23 - 29 mmol/L    Glucose 110 70 - 110 mg/dL    BUN, Bld 22 8 - 23 mg/dL    Creatinine 1.1 0.5 - 1.4 mg/dL    Calcium 9.2 8.7 - 10.5 mg/dL    Total Protein 6.6 6.0 - 8.4 g/dL     Albumin 3.7 3.5 - 5.2 g/dL    Total Bilirubin 0.7 0.1 - 1.0 mg/dL    Alkaline Phosphatase 55 55 - 135 U/L    AST 18 10 - 40 U/L    ALT 22 10 - 44 U/L    Anion Gap 8 8 - 16 mmol/L    eGFR if African American 53 (A) >60 mL/min/1.73 m^2    eGFR if non African American 46 (A) >60 mL/min/1.73 m^2   Hemoglobin A1c   Result Value Ref Range    Hemoglobin A1C 6.3 (H) 4.5 - 6.2 %    Estimated Avg Glucose 134 (H) 68 - 131 mg/dL   Troponin I   Result Value Ref Range    Troponin I 0.061 (H) 0.000 - 0.026 ng/mL   CBC auto differential   Result Value Ref Range    WBC 8.17 3.90 - 12.70 K/uL    RBC 3.53 (L) 4.00 - 5.40 M/uL    Hemoglobin 11.6 (L) 12.0 - 16.0 g/dL    Hematocrit 33.8 (L) 37.0 - 48.5 %    MCV 96 82 - 98 fL    MCH 32.9 (H) 27.0 - 31.0 pg    MCHC 34.3 32.0 - 36.0 %    RDW 13.8 11.5 - 14.5 %    Platelets 192 150 - 350 K/uL    MPV 9.9 9.2 - 12.9 fL    Gran # 5.8 1.8 - 7.7 K/uL    Lymph # 1.3 1.0 - 4.8 K/uL    Mono # 1.0 0.3 - 1.0 K/uL    Eos # 0.1 0.0 - 0.5 K/uL    Baso # 0.03 0.00 - 0.20 K/uL    Gran% 70.5 38.0 - 73.0 %    Lymph% 15.9 (L) 18.0 - 48.0 %    Mono% 12.2 4.0 - 15.0 %    Eosinophil% 1.0 0.0 - 8.0 %    Basophil% 0.4 0.0 - 1.9 %    Differential Method Automated    Comprehensive metabolic panel   Result Value Ref Range    Sodium 142 136 - 145 mmol/L    Potassium 3.7 3.5 - 5.1 mmol/L    Chloride 106 95 - 110 mmol/L    CO2 26 23 - 29 mmol/L    Glucose 106 70 - 110 mg/dL    BUN, Bld 24 (H) 8 - 23 mg/dL    Creatinine 1.3 0.5 - 1.4 mg/dL    Calcium 9.3 8.7 - 10.5 mg/dL    Total Protein 6.6 6.0 - 8.4 g/dL    Albumin 3.6 3.5 - 5.2 g/dL    Total Bilirubin 0.9 0.1 - 1.0 mg/dL    Alkaline Phosphatase 58 55 - 135 U/L    AST 15 10 - 40 U/L    ALT 18 10 - 44 U/L    Anion Gap 10 8 - 16 mmol/L    eGFR if African American 44 (A) >60 mL/min/1.73 m^2    eGFR if non African American 38 (A) >60 mL/min/1.73 m^2   CBC auto differential   Result Value Ref Range    WBC 8.66 3.90 - 12.70 K/uL    RBC 3.90 (L) 4.00 - 5.40 M/uL     Hemoglobin 12.5 12.0 - 16.0 g/dL    Hematocrit 37.3 37.0 - 48.5 %    MCV 96 82 - 98 fL    MCH 32.1 (H) 27.0 - 31.0 pg    MCHC 33.5 32.0 - 36.0 %    RDW 13.9 11.5 - 14.5 %    Platelets 227 150 - 350 K/uL    MPV 9.9 9.2 - 12.9 fL    Gran # 5.2 1.8 - 7.7 K/uL    Lymph # 2.1 1.0 - 4.8 K/uL    Mono # 1.3 (H) 0.3 - 1.0 K/uL    Eos # 0.1 0.0 - 0.5 K/uL    Baso # 0.02 0.00 - 0.20 K/uL    Gran% 59.7 38.0 - 73.0 %    Lymph% 23.7 18.0 - 48.0 %    Mono% 14.8 4.0 - 15.0 %    Eosinophil% 1.6 0.0 - 8.0 %    Basophil% 0.2 0.0 - 1.9 %    Differential Method Automated    Comprehensive metabolic panel   Result Value Ref Range    Sodium 141 136 - 145 mmol/L    Potassium 3.6 3.5 - 5.1 mmol/L    Chloride 105 95 - 110 mmol/L    CO2 26 23 - 29 mmol/L    Glucose 95 70 - 110 mg/dL    BUN, Bld 27 (H) 8 - 23 mg/dL    Creatinine 1.3 0.5 - 1.4 mg/dL    Calcium 9.8 8.7 - 10.5 mg/dL    Total Protein 7.3 6.0 - 8.4 g/dL    Albumin 3.7 3.5 - 5.2 g/dL    Total Bilirubin 1.0 0.1 - 1.0 mg/dL    Alkaline Phosphatase 63 55 - 135 U/L    AST 15 10 - 40 U/L    ALT 18 10 - 44 U/L    Anion Gap 10 8 - 16 mmol/L    eGFR if African American 44 (A) >60 mL/min/1.73 m^2    eGFR if non African American 38 (A) >60 mL/min/1.73 m^2   NM Multi Pharm Stress Cardiac Component   Result Value Ref Range    Diastolic Dysfunction No    2D echo with color flow doppler   Result Value Ref Range    EF 60 55 - 65    Mitral Valve Regurgitation MILD     Diastolic Dysfunction Yes (A)     Est. PA Systolic Pressure 44.97 (A)     Tricuspid Valve Regurgitation TRIVIAL TO MILD          Imaging Results:  Imaging Results         NM Myocardial Perfusion Spect Multi Pharmacologic (Final result) Result time:  05/17/17 15:51:34    Final result by Interface, Rad Results In (05/17/17 15:51:34)    Impression:     Please see cardiology CVIS report of same date for interpretation..      Electronically signed by: VIRTUAL RADIOLOGIST  Date:     05/17/17  Time:    15:51     Narrative:    Please see  cardiology CVIS report of same date for interpretation.            X-Ray Chest PA And Lateral (Final result) Result time:  05/13/17 17:12:44    Final result by Lay Quinonez MD (05/13/17 17:12:44)    Impression:      Cardiomegaly.  No acute findings.      Electronically signed by: LAY QUINONEZ MD  Date:     05/13/17  Time:    17:12     Narrative:    Exam: XR CHEST PA AND LATERAL,    Date:  05/13/17 17:03:50    History: Shortness of breath.    Comparison:  01/09/2017.    Findings: Cardiomegaly is evident.  The lung fields appear clear with no obvious failure.             The EKG was ordered, reviewed, and independently interpreted by the ED provider.  Interpretation time: 16:55  Rate: 63 BPM  Rhythm: normal sinus rhythm  Interpretation: Nonspecific ST and T wave abnormality. No STEMI.           The Emergency Provider reviewed the vital signs and test results, which are outlined above.    ED Discussion     4:55 PM: Chad Lima transfers care of pt to Dr. Graham, pending imaging results.    6:31 PM: Re-evaluated pt. Pt is resting comfortably and is in no acute distress.   D/w pt all pertinent results. D/w pt any concerns expressed at this time. Answered all questions. Pt expresses understanding at this time.    8:00 PM: Dr. Graham transfers care of pt to Dr. Mendiola, pending repeat tropnin results.    8:42 PM: Dr. Mendiola evaluated pt. Pt reports being unable to control her BP for a week after her PCP prescribed her coreg. Pt reports it being in the 200s last night and then going down to 112/48 this morning, but increasing again through the day. Pt complains of SOB and generalized weakness. Pt denies CP. I have discussed test results, shared treatment plan, and the need for admission with patient and family at bedside. Pt and family express understanding at this time and agree with all information. All questions answered. Pt and family have no further questions or concerns at this time. Pt is ready for  admit.    8:52 PM: Discussed case with Dr. Irby ([facility]), agrees with current care and management of pt and accepts admission.   Admitting Service: Hospital medicine   Admitting Physician: Dr. Irby  Admit to: Tele-inpatient    ED Medication(s):  Medications   lorazepam injection 0.5 mg (0.5 mg Intravenous Given 5/15/17 0205)   diphenhydrAMINE capsule 25 mg (25 mg Oral Given 5/15/17 0205)   regadenoson injection 0.4 mg (0.4 mg Intravenous Given 5/15/17 1145)       Discharge Medication List as of 5/16/2017  1:59 PM      START taking these medications    Details   alprazolam (XANAX) 0.5 MG tablet Take 1 tablet (0.5 mg total) by mouth 2 (two) times daily as needed for Anxiety or Insomnia., Starting 5/16/2017, Until Thu 6/15/17, Print      losartan (COZAAR) 50 MG tablet Take 1 tablet (50 mg total) by mouth 2 (two) times daily., Starting 5/16/2017, Until Wed 5/16/18, Normal             Follow-up Information     Follow up with Yandy Terrell MD. Go on 5/22/2017.    Specialty:  Family Medicine    Why:  Hospital follow up at 8 AM     Contact information:    [address] [place] [address] [phone]          Follow up with Jude Metz. Go on 5/23/2017.    Why:  Hospital follow up at 8:30 AM     Contact information:    [address] [facility]  [place] [address]  [phone]                Medical Decision Making    Medical Decision Making:   Clinical Tests:   Lab Tests: Ordered and Reviewed  Radiological Study: Ordered and Reviewed  Medical Tests: Reviewed and Ordered           Scribe Attestation:   Scribe #1: I performed the above scribed service and the documentation accurately describes the services I performed. I attest to the accuracy of the note.    Attending:   Physician Attestation Statement for Scribe #1: I, Chad Lima NP, personally performed the services described in this documentation, as scribed by Mejia Boles, in my presence, and it is both accurate and complete.        Scribe Attestation:   Scribe #1: I performed the above scribed service and the documentation accurately describes the services I performed. I attest to the accuracy of the note.  Scribe #2: I performed the above scribed service and the documentation accurately describes the services I performed. I attest to the accuracy of the note.    Attending Attestation:           Physician Attestation for Scribe:  Physician Attestation Statement for Scribe #1: I, Chseter Graham MD, reviewed documentation, as scribed by Darell Franklin in my presence, and it is both accurate and complete.   Physician Attestation Statement for Scribe #2: I, Usman Mendiola MD, reviewed documentation, as scribed by Adilene Dobson in my presence, and it is both accurate and complete. I also acknowledge and confirm the content of the note done by Scribe #1.          Clinical Impression       ICD-10-CM ICD-9-CM   1. Elevated troponin I level R74.8 790.6   2. Shortness of breath R06.02 786.05   3. Hypertensive emergency I16.1 401.9   4. Chronic kidney disease, stage 3 N18.3 585.3   5. Cardiomegaly I51.7 429.3   6. Hypertensive urgency I16.0 401.9   7. Chest pain R07.9 786.50   8. Macular degeneration - Both Eyes H35.30 362.50   9. Lens replaced by other means Z96.1 V43.1   10. COAG (chronic open-angle glaucoma) - Both Eyes H40.1190 365.11     365.70   11. Blepharitis, unspecified - Both Eyes H01.009 373.00   12. Palpitations R00.2 785.1   13. Abnormal ECG R94.31 794.31   14. PVC's (premature ventricular contractions) I49.3 427.69   15. Myopathy G72.9 359.9   16. Myalgia M79.1 729.1   17. Calcification of aorta I70.0 440.0   18. Neuropathy G62.9 355.9   19. Constipation, chronic K59.09 564.00   20. Elevated blood pressure reading R03.0 796.2       Disposition:   Disposition: Admitted (Tele-inpatient)  Condition: Fair         Usman Mendiola MD  05/17/17 5347

## 2017-05-13 NOTE — TELEPHONE ENCOUNTER
"  Reason for Disposition   [1] MODERATE longstanding difficulty breathing (e.g., speaks in phrases, SOB even at rest, pulse 100-120) AND [2] SAME as normal   [1] BP  >= 160 / 100 AND [2] cardiac or neurologic symptoms    (e.g., chest pain, difficulty breathing, unsteady gait, blurred vision)    Answer Assessment - Initial Assessment Questions  1. RESPIRATORY STATUS: "Describe your breathing?" (e.g., wheezing, shortness of breath, unable to speak, severe coughing)       SOB  2. ONSET: "When did this breathing problem begin?"       5 weeks ago  3. PATTERN "Does the difficult breathing come and go, or has it been constant since it started?"       Comes and goes  4. SEVERITY: "How bad is your breathing?" (e.g., mild, moderate, severe)     - MILD: No SOB at rest, mild SOB with walking, speaks normally in sentences, can lay down, no retractions, pulse < 100.     - MODERATE: SOB at rest, SOB with minimal exertion and prefers to sit, cannot lie down flat, speaks in phrases, mild retractions, audible wheezing, pulse 100-120.     - SEVERE: Very SOB at rest, speaks in single words, struggling to breathe, sitting hunched forward, retractions, pulse > 120       Moderate  5. RECURRENT SYMPTOM: "Have you had difficulty breathing before?" If so, ask: "When was the last time?" and "What happened that time?"       Yes. Patient states nothing. Patient states current SpO2 is 97%.  6. CARDIAC HISTORY: "Do you have any history of heart disease?" (e.g., heart attack, angina, bypass surgery, angioplasty)       No  7. LUNG HISTORY: "Do you have any history of lung disease?"  (e.g., pulmonary embolus, asthma, emphysema)      No  8. CAUSE: "What do you think is causing the breathing problem?"       unknown  9. OTHER SYMPTOMS: "Do you have any other symptoms? (e.g., dizziness, runny nose, cough, chest pain, fever)      Wheezing per patient. Heart pounding. Both arms tingling. Swollen lips.  10. PREGNANCY: "Is there any chance you are " "pregnant?" "When was your last menstrual period?"        No  11. TRAVEL: "Have you traveled out of the country in the last month?" (e.g., travel history, exposures)        No    Answer Assessment - Initial Assessment Questions  1. BLOOD PRESSURE: "What is the blood pressure?" "Did you take at least two measurements 5 minutes apart?"      188/81, hr 68 - 30 min ago; 203/81, hr 67 now  2. ONSET: "When did you take your blood pressure?"      now  3. HOW: "How did you obtain the blood pressure?" (e.g., visiting nurse, automatic home BP monitor)      Automatic home bp monitor  4. HISTORY: "Do you have a history of high blood pressure?"      Yes  5. MEDICATIONS: "Are you taking any medications for blood pressure?" "Have you missed any doses recently?"      Yes. No  6. OTHER SYMPTOMS: "Do you have any symptoms?" (e.g., headache, chest pain, blurred vision, difficulty breathing, weakness)      Tingling in both arms, diffiiculty breathing, generalized weakness  7. PREGNANCY: "Is there any chance you are pregnant?" "When was your last menstrual period?"      no    Protocols used: ST BREATHING DIFFICULTY-A-AH, ST HIGH BLOOD PRESSURE-A-AH      "

## 2017-05-13 NOTE — IP AVS SNAPSHOT
[address] [place] [address]           Patient Discharge Instructions   Our goal is to set you up for success. This packet includes information on your condition, medications, and your home care.  It will help you care for yourself to prevent having to return to the hospital.     Please ask your nurse if you have any questions.      There are many details to remember when preparing to leave the hospital. Here is what you will need to do:    1. Take your medicine. If you are prescribed medications, review your Medication List on the following pages. You may have new medications to  at the pharmacy and others that you'll need to stop taking. Review the instructions for how and when to take your medications. Talk with your doctor or nurses if you are unsure of what to do.     2. Go to your follow-up appointments. Specific follow-up information is listed in the following pages. Your may be contacted by a nurse or clinical provider about future appointments. Be sure we have all of the phone numbers to reach you. Please contact your provider's office if you are unable to make an appointment.     3. Watch for warning signs. Your doctor or nurse will give you detailed warning signs to watch for and when to call for assistance. These instructions may also include educational information about your condition. If you experience any of warning signs to your health, call your doctor.               ** Verify the list of medication(s) below is accurate and up to date. Carry this with you in case of emergency. If your medications have changed, please notify your healthcare provider.             Medication List      START taking these medications        Additional Info                      alprazolam 0.5 MG tablet   Commonly known as:  XANAX   Quantity:  4 tablet   Refills:  0   Dose:  0.5 mg    Last time this was given:  0.5 mg on 5/15/2017  8:14 PM   Instructions:  Take 1  tablet (0.5 mg total) by mouth 2 (two) times daily as needed for Anxiety or Insomnia.     Begin Date    AM    Noon    PM    Bedtime       losartan 50 MG tablet   Commonly known as:  COZAAR   Quantity:  60 tablet   Refills:  1   Dose:  50 mg    Last time this was given:  50 mg on 5/16/2017  8:08 AM   Instructions:  Take 1 tablet (50 mg total) by mouth 2 (two) times daily.     Begin Date    AM    Noon    PM    Bedtime         CHANGE how you take these medications        Additional Info                      cloNIDine 0.1 MG tablet   Commonly known as:  CATAPRES   Quantity:  60 tablet   Refills:  11   Dose:  0.1 mg   What changed:  reasons to take this    Last time this was given:  0.1 mg on 5/13/2017 11:57 PM   Instructions:  Take 1 tablet (0.1 mg total) by mouth 2 (two) times daily as needed (BP greater than 160/90).     Begin Date    AM    Noon    PM    Bedtime         CONTINUE taking these medications        Additional Info                      acetaminophen 500 MG tablet   Commonly known as:  TYLENOL   Refills:  0   Dose:  500 mg    Last time this was given:  650 mg on 5/16/2017  8:06 AM   Instructions:  Take 500 mg by mouth once daily at 6am.     Begin Date    AM    Noon    PM    Bedtime       hydrochlorothiazide 25 MG tablet   Commonly known as:  HYDRODIURIL   Quantity:  60 tablet   Refills:  2   Dose:  25 mg    Last time this was given:  25 mg on 5/14/2017  9:17 AM   Instructions:  Take 1 tablet (25 mg total) by mouth 2 (two) times daily.     Begin Date    AM    Noon    PM    Bedtime       loteprednol 0.5 % ophthalmic suspension   Commonly known as:  LOTEMAX   Refills:  0   Dose:  1 drop    Instructions:  1 drop once daily. One drop in the left eye and 1 drops in the right eye     Begin Date    AM    Noon    PM    Bedtime       metoprolol succinate 25 MG 24 hr tablet   Commonly known as:  TOPROL-XL   Quantity:  60 tablet   Refills:  6   Dose:  25 mg   Comments:  Dose adjustment, please d/c previous orders for  metoprolol    Last time this was given:  25 mg on 5/16/2017  8:07 AM   Instructions:  Take 1 tablet (25 mg total) by mouth 2 (two) times daily.     Begin Date    AM    Noon    PM    Bedtime       MIRALAX ORAL   Refills:  0    Instructions:  Take by mouth.     Begin Date    AM    Noon    PM    Bedtime         STOP taking these medications     carvedilol 25 MG tablet   Commonly known as:  COREG       diphenhydramine-acetaminophen  mg Tab   Commonly known as:  TYLENOL PM            Where to Get Your Medications      These medications were sent to [facility] - [place] - [address] [facility] [address]  [address] [facility], [place] [address]     Phone:  [phone]     losartan 50 MG tablet         You can get these medications from any pharmacy     Bring a paper prescription for each of these medications     alprazolam 0.5 MG tablet                  Please bring to all follow up appointments:    1. A copy of your discharge instructions.  2. All medicines you are currently taking in their original bottles.  3. Identification and insurance card.    Please arrive 15 minutes ahead of scheduled appointment time.    Please call 24 hours in advance if you must reschedule your appointment and/or time.        Your Scheduled Appointments     May 22, 2017  8:00 AM CDT   Follow Up/Office Visit with Yandy Terrell MD   [facility] ([facility])    [address] [id number] [phone]            May 23, 2017  8:30 AM CDT   Established Patient Visit with FABIO Estrella - Cardiology ([facility])    [id number] [place] [address] [phone]              Follow-up Information     Follow up with Yandy Terrell MD. Go on 5/22/2017.    Specialty:  Family Medicine    Why:  Hospital follow up at 8 AM     Contact information:    [address] [place] [address] [address] [place] [address] [phone]          Follow up with  "Jude Metz. Go on 5/23/2017.    Why:  Hospital follow up at 8:30 AM     Contact information:    [address]  [place] [address] [phone]            Discharge Instructions     Future Orders    Activity as tolerated     Call MD for:  difficulty breathing or increased cough     Call MD for:  increased confusion or weakness     Call MD for:  persistent dizziness, light-headedness, or visual disturbances     Diet general     Questions:    Total calories:      Fat restriction, if any:      Protein restriction, if any:      Na restriction, if any:      Fluid restriction:      Additional restrictions:  Cardiac (Low Na/Chol)        Primary Diagnosis     Your primary diagnosis was:  Severe Uncontrolled High Blood Pressure      Admission Information     Date & Time Provider Department CSN    5/13/2017  4:36 PM Ric Riley MD [facility] -  [id number]      Care Providers     Provider Role Specialty Primary office phone    Ric Riley MD Attending Provider Internal Medicine [phone]    Camilo Ya MD Consulting Physician  Cardiology [phone]    Krzysztof Sams MD Consulting Physician  Internal Medicine  [phone]    Ric Riley MD Team Attending  Internal Medicine [phone]      Your Vitals Were     BP Pulse Temp Resp Height Weight    164/75 (BP Location: Right arm, Patient Position: Sitting, BP Method: Automatic) 79 98.7 °F (37.1 °C) (Oral) 18 5' 4" (1.626 m) 79.8 kg (176 lb)    SpO2 BMI             95% 30.21 kg/m2         Recent Lab Values        4/20/2012 10/19/2012 4/3/2013 10/7/2013 4/10/2014 11/20/2014 11/2/2015 5/14/2017      8:51 AM  7:55 AM  8:44 AM  8:42 AM  9:16 AM  8:26 AM  9:33 AM  5:50 AM    A1C 6.2 5.9 6.3 (H) 6.2 6.2 5.9 6.1 6.3 (H)    Comment for A1C at  5:50 AM on 5/14/2017:  According to ADA guidelines, hemoglobin A1C <7.0% represents  optimal control in non-pregnant diabetic patients.  Different  metrics may apply " to specific populations.   Standards of Medical Care in Diabetes - 2016.  For the purpose of screening for the presence of diabetes:  <5.7%     Consistent with the absence of diabetes  5.7-6.4%  Consistent with increasing risk for diabetes   (prediabetes)  >or=6.5%  Consistent with diabetes  Currently no consensus exists for use of hemoglobin A1C  for diagnosis of diabetes for children.        Pending Labs     Order Current Status    Lipid panel In process      Allergies as of 5/16/2017        Reactions    Claritin [Loratadine] Other (See Comments)    Double vision/spots    Hydrocodone-acetaminophen     wheezing    Naproxen     wheezing    Vibramycin [Doxycycline Calcium] Other (See Comments)    Weakness nausea   sob    Amlodipine     Myalgias    Fenofibrate     Causes muscle weakness    Hydralazine Analogues     Muscle tremors/aches    Isosorbide     Lasix [Furosemide]     myalgias    Moxifloxacin Other (See Comments)    Hives   muscle soreness    Timolol     Allegra [Fexofenadine] Other (See Comments)    Double vision/spots     Codeine Diarrhea, Other (See Comments)    Loss of balance     Erythromycin Other (See Comments)    Complete weakness/could not walk    Hydrocortisone (Bulk) Other (See Comments)    Only suppository/ caused muscle weakness    Macrolide Antibiotics Other (See Comments)    Complete weakness/could not walk    Patanol [Olopatadine] Other (See Comments)    Muscle weakness    Prednisone Anxiety    Statins-hmg-coa Reductase Inhibitors Other (See Comments)    Muscle weakness    Xalatan [Latanoprost] Other (See Comments)    Muscle weakness      [facility]     [facility] Care Line - 24/7 Assistance  Unless otherwise directed by your provider, please contact [facility], our nurse care line that is available for 24/7 assistance.     Registered nurses in the [facility] provide clinical advisement, health education, appointment booking, and other advisory services.  Call  for this free service at [phone].        Advance Directives     An advance directive is a document which, in the event you are no longer able to make decisions for yourself, tells your healthcare team what kind of treatment you do or do not want to receive, or who you would like to make those decisions for you.  If you do not currently have an advance directive, [facility] encourages you to create one.  For more information call:  [phone]), [phone] ([phone]),  or log on to [email or web address].        Language Assistance Services     ATTENTION: Language assistance services are available, free of charge. Please call [phone].      ATENCIÓN: Si habla español, tiene a wylie disposición servicios gratuitos de asistencia lingüística. Llame al [phone].     CHÚ Ý: N?u b?n nói Ti?ng Vi?t, có các d?ch v? h? tr? ngôn ng? mi?n phí dành cho b?n. G?i s? [phone].        Chronic Kindey Disease Education              [facility]  complies with applicable Federal civil rights laws and does not discriminate on the basis of race, color, national origin, age, disability, or sex.

## 2017-05-14 LAB
ALBUMIN SERPL BCP-MCNC: 3.7 G/DL
ALP SERPL-CCNC: 55 U/L
ALT SERPL W/O P-5'-P-CCNC: 22 U/L
ANION GAP SERPL CALC-SCNC: 8 MMOL/L
AST SERPL-CCNC: 18 U/L
BASOPHILS # BLD AUTO: 0.03 K/UL
BASOPHILS NFR BLD: 0.4 %
BILIRUB SERPL-MCNC: 0.7 MG/DL
BUN SERPL-MCNC: 22 MG/DL
CALCIUM SERPL-MCNC: 9.2 MG/DL
CHLORIDE SERPL-SCNC: 108 MMOL/L
CO2 SERPL-SCNC: 25 MMOL/L
CREAT SERPL-MCNC: 1.1 MG/DL
DIASTOLIC DYSFUNCTION: YES
DIFFERENTIAL METHOD: ABNORMAL
EOSINOPHIL # BLD AUTO: 0.1 K/UL
EOSINOPHIL NFR BLD: 1.4 %
ERYTHROCYTE [DISTWIDTH] IN BLOOD BY AUTOMATED COUNT: 13.7 %
EST. GFR  (AFRICAN AMERICAN): 53 ML/MIN/1.73 M^2
EST. GFR  (NON AFRICAN AMERICAN): 46 ML/MIN/1.73 M^2
ESTIMATED PA SYSTOLIC PRESSURE: 44.97
GLUCOSE SERPL-MCNC: 110 MG/DL
HCT VFR BLD AUTO: 34.8 %
HGB BLD-MCNC: 11.7 G/DL
LYMPHOCYTES # BLD AUTO: 1.6 K/UL
LYMPHOCYTES NFR BLD: 21.2 %
MCH RBC QN AUTO: 32.4 PG
MCHC RBC AUTO-ENTMCNC: 33.6 %
MCV RBC AUTO: 96 FL
MITRAL VALVE REGURGITATION: ABNORMAL
MONOCYTES # BLD AUTO: 0.9 K/UL
MONOCYTES NFR BLD: 11.2 %
NEUTROPHILS # BLD AUTO: 5 K/UL
NEUTROPHILS NFR BLD: 65.8 %
PLATELET # BLD AUTO: 194 K/UL
PMV BLD AUTO: 9.9 FL
POTASSIUM SERPL-SCNC: 3.7 MMOL/L
PROT SERPL-MCNC: 6.6 G/DL
RBC # BLD AUTO: 3.61 M/UL
RETIRED EF AND QEF - SEE NOTES: 60 (ref 55–65)
SODIUM SERPL-SCNC: 141 MMOL/L
TRICUSPID VALVE REGURGITATION: ABNORMAL
TROPONIN I SERPL DL<=0.01 NG/ML-MCNC: 0.04 NG/ML
TROPONIN I SERPL DL<=0.01 NG/ML-MCNC: 0.06 NG/ML
WBC # BLD AUTO: 7.59 K/UL

## 2017-05-14 PROCEDURE — 25000003 PHARM REV CODE 250: Performed by: INTERNAL MEDICINE

## 2017-05-14 PROCEDURE — 93308 TTE F-UP OR LMTD: CPT | Mod: 26,,, | Performed by: INTERNAL MEDICINE

## 2017-05-14 PROCEDURE — 21400001 HC TELEMETRY ROOM

## 2017-05-14 PROCEDURE — 36415 COLL VENOUS BLD VENIPUNCTURE: CPT

## 2017-05-14 PROCEDURE — 93306 TTE W/DOPPLER COMPLETE: CPT

## 2017-05-14 PROCEDURE — 25000003 PHARM REV CODE 250: Performed by: NURSE PRACTITIONER

## 2017-05-14 PROCEDURE — 83036 HEMOGLOBIN GLYCOSYLATED A1C: CPT

## 2017-05-14 PROCEDURE — 84484 ASSAY OF TROPONIN QUANT: CPT

## 2017-05-14 PROCEDURE — 99223 1ST HOSP IP/OBS HIGH 75: CPT | Mod: ,,, | Performed by: INTERNAL MEDICINE

## 2017-05-14 PROCEDURE — 80053 COMPREHEN METABOLIC PANEL: CPT

## 2017-05-14 PROCEDURE — 25000003 PHARM REV CODE 250: Performed by: EMERGENCY MEDICINE

## 2017-05-14 PROCEDURE — 85025 COMPLETE CBC W/AUTO DIFF WBC: CPT

## 2017-05-14 PROCEDURE — 63600175 PHARM REV CODE 636 W HCPCS: Performed by: INTERNAL MEDICINE

## 2017-05-14 RX ORDER — METOPROLOL SUCCINATE 25 MG/1
25 TABLET, EXTENDED RELEASE ORAL 2 TIMES DAILY
Status: DISCONTINUED | OUTPATIENT
Start: 2017-05-14 | End: 2017-05-14

## 2017-05-14 RX ORDER — METOPROLOL SUCCINATE 25 MG/1
25 TABLET, EXTENDED RELEASE ORAL 2 TIMES DAILY
Status: DISCONTINUED | OUTPATIENT
Start: 2017-05-14 | End: 2017-05-16 | Stop reason: HOSPADM

## 2017-05-14 RX ORDER — LOSARTAN POTASSIUM 50 MG/1
50 TABLET ORAL 2 TIMES DAILY
Status: DISCONTINUED | OUTPATIENT
Start: 2017-05-14 | End: 2017-05-16 | Stop reason: HOSPADM

## 2017-05-14 RX ORDER — METOPROLOL SUCCINATE 50 MG/1
50 TABLET, EXTENDED RELEASE ORAL 2 TIMES DAILY
Status: DISCONTINUED | OUTPATIENT
Start: 2017-05-14 | End: 2017-05-14

## 2017-05-14 RX ADMIN — METOPROLOL SUCCINATE 25 MG: 25 TABLET, EXTENDED RELEASE ORAL at 08:05

## 2017-05-14 RX ADMIN — DIPHENHYDRAMINE HYDROCHLORIDE 25 MG: 25 CAPSULE ORAL at 09:05

## 2017-05-14 RX ADMIN — FAMOTIDINE 20 MG: 20 TABLET ORAL at 09:05

## 2017-05-14 RX ADMIN — LOTEPREDNOL ETABONATE 1 DROP: 5 GEL OPHTHALMIC at 09:05

## 2017-05-14 RX ADMIN — LOSARTAN POTASSIUM 50 MG: 50 TABLET, FILM COATED ORAL at 08:05

## 2017-05-14 RX ADMIN — HYDRALAZINE HYDROCHLORIDE 10 MG: 20 INJECTION INTRAMUSCULAR; INTRAVENOUS at 09:05

## 2017-05-14 RX ADMIN — FAMOTIDINE 20 MG: 20 TABLET ORAL at 08:05

## 2017-05-14 RX ADMIN — HYDROCHLOROTHIAZIDE 25 MG: 25 TABLET ORAL at 09:05

## 2017-05-14 RX ADMIN — METOPROLOL SUCCINATE 25 MG: 25 TABLET, EXTENDED RELEASE ORAL at 09:05

## 2017-05-14 RX ADMIN — LOSARTAN POTASSIUM 50 MG: 50 TABLET, FILM COATED ORAL at 09:05

## 2017-05-14 RX ADMIN — ACETAMINOPHEN 650 MG: 325 TABLET ORAL at 06:05

## 2017-05-14 RX ADMIN — ENOXAPARIN SODIUM 40 MG: 100 INJECTION SUBCUTANEOUS at 04:05

## 2017-05-14 NOTE — PROGRESS NOTES
[facility] Hospital Medicine  Progress Note    Patient Name: Shirley Metz  MRN: [id number]  Patient Class: IP- Inpatient   Admission Date: 5/13/2017  Length of Stay: 1 days  Attending Physician: Sebastián Irby MD  Primary Care Provider: Yandy Terrell MD        Subjective:     Principal Problem:Hypertensive urgency    HPI:  Ms. Bettencourt is a 85 y/o  female with h/o HTN presents to the ED c/o worsening SOB and uncontrolled hypertension for the past 2-3 weeks associated with dry mouth, increasing fatigue since starting coreg. Patient reports intolerance to multiple anti-hypertensive medications. In the ED, her BP was elevated at 201/83, repeat 175/82, however troponin initially elevated at 0.029, increased to 0.043. EKG unremarkable. Patient denies chest pain.     Hospital Course:  Patient admitted to Telemetry Unit for hypertension crisis with Cardiology consulted. Patient treated with HCTZ, Losartan, Metoprolol, and PRN antihypertensives. EKG unremarkable. Troponin trending up. 2D-Echo pending. Stress test in AM. Blood pressure stable. Patient denies chest pain but reports SOB.     Interval History: Patient stable overnight with improved blood pressure. Patient denies any chest pain or SOB. Cardiology following. Stress test in AM.     Review of Systems   Constitutional: Positive for fatigue. Negative for chills, diaphoresis and fever.   HENT: Negative.  Negative for congestion, nosebleeds and sinus pressure.    Eyes: Negative.  Negative for photophobia, redness and visual disturbance.   Respiratory: Positive for shortness of breath. Negative for cough, chest tightness and wheezing.    Cardiovascular: Negative.  Negative for chest pain, palpitations and leg swelling.   Gastrointestinal: Negative.  Negative for abdominal pain, diarrhea, nausea and vomiting.   Endocrine: Negative.  Negative for polydipsia, polyphagia and polyuria.   Genitourinary: Negative.  Negative for dysuria,  flank pain, frequency and urgency.   Musculoskeletal: Negative.  Negative for back pain, joint swelling and neck stiffness.   Skin: Negative.  Negative for color change, pallor and rash.   Allergic/Immunologic: Negative.  Negative for environmental allergies, food allergies and immunocompromised state.   Neurological: Negative for dizziness, syncope, speech difficulty, numbness and headaches.   Hematological: Negative.  Negative for adenopathy. Does not bruise/bleed easily.   Psychiatric/Behavioral: Negative.  Negative for confusion, decreased concentration and hallucinations. The patient is not nervous/anxious.    All other systems reviewed and are negative.    Objective:     Vital Signs (Most Recent):  Temp: 98.3 °F (36.8 °C) (05/14/17 0341)  Pulse: 75 (05/14/17 0848)  Resp: 18 (05/14/17 0848)  BP: (!) 163/79 (05/14/17 0848)  SpO2: 97 % (05/14/17 0848) Vital Signs (24h Range):  Temp:  [98.1 °F (36.7 °C)-98.7 °F (37.1 °C)] 98.3 °F (36.8 °C)  Pulse:  [60-75] 75  Resp:  [18-20] 18  SpO2:  [94 %-98 %] 97 %  BP: (119-201)/(64-84) 163/79     Weight: 81.6 kg (180 lb)  Body mass index is 30.9 kg/(m^2).    Intake/Output Summary (Last 24 hours) at 05/14/17 1003  Last data filed at 05/14/17 0600   Gross per 24 hour   Intake              360 ml   Output              800 ml   Net             -440 ml      Physical Exam   Constitutional: She is oriented to person, place, and time. She appears well-developed and well-nourished. No distress.   HENT:   Head: Normocephalic and atraumatic.   Eyes: Conjunctivae and EOM are normal. No scleral icterus.   Neck: Normal range of motion. Neck supple. No JVD present. No tracheal deviation present. No thyromegaly present.   Cardiovascular: Normal rate, regular rhythm, normal heart sounds and intact distal pulses.    No murmur heard.  Pulmonary/Chest: Effort normal and breath sounds normal. No respiratory distress. She has no wheezes. She has no rales. She exhibits no tenderness.   Abdominal:  Soft. Bowel sounds are normal. She exhibits no distension. There is no tenderness.   Musculoskeletal: Normal range of motion. She exhibits no edema or tenderness.   Lymphadenopathy:     She has no cervical adenopathy.   Neurological: She is alert and oriented to person, place, and time. No cranial nerve deficit. She exhibits normal muscle tone. Coordination normal.   Skin: Skin is warm and dry. She is not diaphoretic. No erythema.   Psychiatric: She has a normal mood and affect. Her behavior is normal. Judgment and thought content normal.   Nursing note and vitals reviewed.      Significant Labs:   CBC:   Recent Labs  Lab 05/13/17 1655 05/14/17  0550   WBC 6.04 7.59   HGB 12.2 11.7*   HCT 36.2* 34.8*    194     CMP:   Recent Labs  Lab 05/13/17 1655 05/14/17  0550    141   K 4.0 3.7    108   CO2 23 25   GLU 99 110   BUN 25* 22   CREATININE 1.2 1.1   CALCIUM 9.3 9.2   PROT 7.2 6.6   ALBUMIN 3.9 3.7   BILITOT 0.3 0.7   ALKPHOS 62 55   AST 21 18   ALT 26 22   ANIONGAP 10 8   EGFRNONAA 42* 46*     Cardiac Markers:   Recent Labs  Lab 05/13/17 1655   *     All pertinent labs within the past 24 hours have been reviewed.    Significant Imaging:   Imaging Results         X-Ray Chest PA And Lateral (Final result) Result time:  05/13/17 17:12:44    Final result by Lay Quinonez MD (05/13/17 17:12:44)    Impression:      Cardiomegaly.  No acute findings.      Electronically signed by: LAY QUINONEZ MD  Date:     05/13/17  Time:    17:12     Narrative:    Exam: XR CHEST PA AND LATERAL,    Date:  05/13/17 17:03:50    History: Shortness of breath.    Comparison:  01/09/2017.    Findings: Cardiomegaly is evident.  The lung fields appear clear with no obvious failure.            Assessment/Plan:      * Hypertensive urgency  -  BP controlled currently, SOB reported  - Patient intolerant to CCB, Lasix, hydralazine, Coreg  - Will resume losartan, metoprolol and HCTZ  - Cardiology following        Elevated troponin I level  - Likely related to uncontrolled HTN  - Patient denies chest pain but reports SOB  - Most recent troponin 0.061   -Cardiology following  -Stress test in AM       VTE Risk Mitigation         Ordered     enoxaparin injection 40 mg  Daily     Route:  Subcutaneous        05/13/17 2158     Medium Risk of VTE  Once      05/13/17 2135          Mirna Gomez NP  Department of Hospital Medicine   [facility]

## 2017-05-14 NOTE — ASSESSMENT & PLAN NOTE
- Likely related to uncontrolled HTN  - Patient denies chest pain but reports SOB  - Most recent troponin 0.061   -Cardiology following  -Stress test in AM

## 2017-05-14 NOTE — ASSESSMENT & PLAN NOTE
- Likely related to uncontrolled HTN  - Patient denies chest pian, but c/o SOB  - Initial troponin 0.029, repeat increased to 0.043, trend cardiac enzymes

## 2017-05-14 NOTE — ASSESSMENT & PLAN NOTE
- Uncontrolled BP with SOB, multiple allergies  - Patient intolerant to CCB, Lasix, hydralazine, Coreg  - Will resume metoprolol and HCTZ  - Reports she was tolerating losartan (will resume)  - Consult cardiology (have been adjusting her BP medications)

## 2017-05-14 NOTE — H&P
"[facility] Hospital Medicine  History & Physical    Patient Name: Shirley Metz  MRN: [id number]  Admission Date: 5/13/2017  Attending Physician: Sebastián Irby MD  Primary Care Provider: Yandy Terrell MD         Patient information was obtained from patient and ER records.     Subjective:     Principal Problem:Hypertensive urgency    Chief Complaint:   Chief Complaint   Patient presents with    Medication Reaction     pt on coreg x 1 week c/o sob with "swollen lips and difficulty walking" after starting medication.  pt appears asymtomatic at this time        HPI: Ms. Bettencourt is a 85 y/o  female with h/o HTN presents to the ED c/o worsening SOB and uncontrolled hypertension for the past 2-3 weeks associated with dry mouth, increasing fatigue since starting coreg. Patient reports intolerance to multiple anti-hypertensive medications. In the ED, her BP was elevated at 201/83, repeat 175/82, however troponin initially elevated at 0.029, increased to 0.043. EKG unremarkable. Patient denies chest pain.     Past Medical History:   Diagnosis Date    Arthritis     Back pain     Fuchs' corneal dystrophy     Glaucoma     Hyperlipidemia     Hypertension     Macular degeneration dry    Obesity     Trouble in sleeping     Urinary tract infection        Past Surgical History:   Procedure Laterality Date    ADENOIDECTOMY  1938    BREAST CYST EXCISION Left 1997 approx    CARPAL TUNNEL RELEASE Right 2012 approx    CATARACT EXTRACTION Bilateral 1994    CHOLECYSTECTOMY  1975    CORNEAL TRANSPLANT Bilateral 08/2015 8/2015 - left; Right 4/2016    EYE SURGERY      Fuch's Corneal dystrophy - had 2nd operation with Dr. Quintanilla    EYE SURGERY      Cataract wIOL    left thumb Left 2011    removed cuboid for arthritis    SLT- OS (aka TRABECULOPLASTY) Left 05/11/2011    repeat 3/14/12    TONSILLECTOMY  1938       Review of patient's allergies indicates:   Allergen Reactions    " Claritin [loratadine] Other (See Comments)     Double vision/spots    Hydrocodone-acetaminophen      wheezing    Naproxen      wheezing    Vibramycin [doxycycline calcium] Other (See Comments)     Weakness nausea   sob    Amlodipine      Myalgias      Fenofibrate      Causes muscle weakness    Hydralazine analogues      Muscle tremors/aches      Isosorbide     Lasix [furosemide]      myalgias    Moxifloxacin Other (See Comments)     Hives   muscle soreness    Timolol     Allegra [fexofenadine] Other (See Comments)     Double vision/spots     Codeine Diarrhea and Other (See Comments)     Loss of balance     Erythromycin Other (See Comments)     Complete weakness/could not walk    Hydrocortisone (bulk) Other (See Comments)     Only suppository/ caused muscle weakness    Macrolide antibiotics Other (See Comments)     Complete weakness/could not walk    Patanol [olopatadine] Other (See Comments)     Muscle weakness    Prednisone Anxiety    Statins-hmg-coa reductase inhibitors Other (See Comments)     Muscle weakness    Xalatan [latanoprost] Other (See Comments)     Muscle weakness       No current facility-administered medications on file prior to encounter.      Current Outpatient Prescriptions on File Prior to Encounter   Medication Sig    acetaminophen (TYLENOL) 500 MG tablet Take 500 mg by mouth once daily at 6am.    carvedilol (COREG) 25 MG tablet Take 1 tablet (25 mg total) by mouth 2 (two) times daily with meals. (Patient taking differently: Take 12.5 mg by mouth 2 (two) times daily with meals. )    cloNIDine (CATAPRES) 0.1 MG tablet Take 1 tablet (0.1 mg total) by mouth 2 (two) times daily as needed (BP greater than 160/90). (Patient taking differently: Take 0.1 mg by mouth 2 (two) times daily as needed (BP greater than 160/90  takes one at 7 pm and one at 11 pm). )    loteprednol (LOTEMAX) 0.5 % ophthalmic suspension 1 drop once daily. One drop in the left eye and 1 drops in the right eye     metoprolol succinate (TOPROL-XL) 25 MG 24 hr tablet Take 1 tablet (25 mg total) by mouth 2 (two) times daily.    diphenhydramine-acetaminophen (TYLENOL PM)  mg Tab Take 1 tablet by mouth nightly as needed.    hydrochlorothiazide (HYDRODIURIL) 25 MG tablet Take 1 tablet (25 mg total) by mouth 2 (two) times daily.     Family History     Problem Relation (Age of Onset)    Cancer Father (88)    Heart disease Mother    Hypertension Mother        Social History Main Topics    Smoking status: Never Smoker    Smokeless tobacco: Never Used      Comment: history of passive smoking from her ex-    Alcohol use 1.2 oz/week     2 Standard drinks or equivalent per week      Comment: 2 scotch drinks a week.     Drug use: No    Sexual activity: Yes     Partners: Male      Comment: discussed protection for STD's     Review of Systems   Constitutional: Positive for fatigue. Negative for chills, diaphoresis and fever.   HENT: Negative.  Negative for congestion, nosebleeds and sinus pressure.    Eyes: Negative.  Negative for photophobia, redness and visual disturbance.   Respiratory: Positive for shortness of breath. Negative for cough, chest tightness and wheezing.    Cardiovascular: Negative.  Negative for chest pain, palpitations and leg swelling.   Gastrointestinal: Negative.  Negative for abdominal pain, diarrhea, nausea and vomiting.   Endocrine: Negative.  Negative for polydipsia, polyphagia and polyuria.   Genitourinary: Negative.  Negative for dysuria, flank pain, frequency and urgency.   Musculoskeletal: Negative.  Negative for back pain, joint swelling and neck stiffness.   Skin: Negative.  Negative for color change, pallor and rash.   Allergic/Immunologic: Negative.  Negative for environmental allergies, food allergies and immunocompromised state.   Neurological: Positive for dizziness. Negative for syncope, speech difficulty, numbness and headaches.   Hematological: Negative.  Negative for  adenopathy. Does not bruise/bleed easily.   Psychiatric/Behavioral: Negative.  Negative for confusion, decreased concentration and hallucinations. The patient is not nervous/anxious.    All other systems reviewed and are negative.    Objective:     Vital Signs (Most Recent):  Temp: 98.7 °F (37.1 °C) (05/13/17 2138)  Pulse: 72 (05/13/17 2138)  Resp: 20 (05/13/17 2138)  BP: (!) 175/82 (05/13/17 2138)  SpO2: 98 % (05/13/17 2138) Vital Signs (24h Range):  Temp:  [98.1 °F (36.7 °C)-98.7 °F (37.1 °C)] 98.7 °F (37.1 °C)  Pulse:  [65-72] 72  Resp:  [20] 20  SpO2:  [94 %-98 %] 98 %  BP: (175-201)/(74-83) 175/82     Weight: 81.6 kg (180 lb)  Body mass index is 30.9 kg/(m^2).    Physical Exam   Constitutional: She is oriented to person, place, and time. She appears well-developed and well-nourished. No distress.   HENT:   Head: Normocephalic and atraumatic.   Eyes: Conjunctivae and EOM are normal. No scleral icterus.   Neck: Normal range of motion. Neck supple. No JVD present. No tracheal deviation present. No thyromegaly present.   Cardiovascular: Normal rate, regular rhythm, normal heart sounds and intact distal pulses.    No murmur heard.  Pulmonary/Chest: Effort normal and breath sounds normal. No respiratory distress. She has no wheezes. She has no rales. She exhibits no tenderness.   Abdominal: Soft. Bowel sounds are normal. She exhibits no distension. There is no tenderness.   Musculoskeletal: Normal range of motion. She exhibits no edema or tenderness.   Lymphadenopathy:     She has no cervical adenopathy.   Neurological: She is alert and oriented to person, place, and time. No cranial nerve deficit. She exhibits normal muscle tone. Coordination normal.   Skin: Skin is warm and dry. She is not diaphoretic. No erythema.   Psychiatric: She has a normal mood and affect. Her behavior is normal. Judgment and thought content normal.   Nursing note and vitals reviewed.       Significant Labs:   BMP:   Recent Labs  Lab  05/13/17  1655   GLU 99      K 4.0      CO2 23   BUN 25*   CREATININE 1.2   CALCIUM 9.3     CBC:   Recent Labs  Lab 05/13/17  1655   WBC 6.04   HGB 12.2   HCT 36.2*        CMP:   Recent Labs  Lab 05/13/17  1655      K 4.0      CO2 23   GLU 99   BUN 25*   CREATININE 1.2   CALCIUM 9.3   PROT 7.2   ALBUMIN 3.9   BILITOT 0.3   ALKPHOS 62   AST 21   ALT 26   ANIONGAP 10   EGFRNONAA 42*     Cardiac Markers:   Recent Labs  Lab 05/13/17  1655   *     Troponin:   Recent Labs  Lab 05/13/17  1655 05/13/17  1925   TROPONINI 0.029* 0.043*     All pertinent labs within the past 24 hours have been reviewed.    Significant Imaging: I have reviewed and interpreted all pertinent imaging results/findings within the past 24 hours.     Imaging Results         X-Ray Chest PA And Lateral (Final result) Result time:  05/13/17 17:12:44    Final result by Lay Quinonez MD (05/13/17 17:12:44)    Impression:      Cardiomegaly.  No acute findings.      Electronically signed by: LAY QUINONEZ MD  Date:     05/13/17  Time:    17:12     Narrative:    Exam: XR CHEST PA AND LATERAL,    Date:  05/13/17 17:03:50    History: Shortness of breath.    Comparison:  01/09/2017.    Findings: Cardiomegaly is evident.  The lung fields appear clear with no obvious failure.                Assessment/Plan:     * Hypertensive urgency  - Uncontrolled BP with SOB, multiple allergies  - Patient intolerant to CCB, Lasix, hydralazine, Coreg  - Will resume metoprolol and HCTZ  - Reports she was tolerating losartan (will resume)  - Consult cardiology (have been adjusting her BP medications)      Elevated troponin I level  - Likely related to uncontrolled HTN  - Patient denies chest pian, but c/o SOB  - Initial troponin 0.029, repeat increased to 0.043, trend cardiac enzymes      VTE Risk Mitigation         Ordered     enoxaparin injection 40 mg  Daily     Route:  Subcutaneous        05/13/17 8529     Medium Risk of VTE   Once      05/13/17 2138        Sebastián Irby MD  Department of Hospital Medicine   [facility]

## 2017-05-14 NOTE — PROGRESS NOTES
Patient arrived to unit from ER via stretcher. Transferred into bed independently. Bedside report given by ALPHONSE Bryant. VS stable. Tele monitored applied. Patient oriented to room and call bell. Encouraged to notify of all needs. Will continue to monitor.

## 2017-05-14 NOTE — PLAN OF CARE
Problem: Patient Care Overview  Goal: Plan of Care Review  Outcome: Ongoing (interventions implemented as appropriate)  Pt. Is aware of plan of care to continue blood pressure management and that she will have a pharmacological stress test in the morning.  Pt. Has no complaints at this time.

## 2017-05-14 NOTE — ASSESSMENT & PLAN NOTE
-  BP controlled currently, SOB reported  - Patient intolerant to CCB, Lasix, hydralazine, Coreg  - Will resume losartan, metoprolol and HCTZ  - Cardiology following

## 2017-05-14 NOTE — CONSULTS
Consult Note  Cardiology    Consult Requested By: Dr Irby   Reason for Consult: Elevated troponin, uncontrolled HTN    SUBJECTIVE:     History of Present Illness:  Ms Edmond is a 84 y.o. female presents with PMHx of HTN, HLD and palpitations. [place] [facility] [place] shortness of breath associated with swollen lips, difficult walking and diaphoresis. Denies chest pain, syncope or dizziness. She was started on Coreg for hypertension about one week ago. She has history of multiple reactions to Blood pressure medications. EKG: Normal sinus rhythm, Nonspecific ST and T wave abnormality. 2 D Echo on 1/17/2017 normal left ventricular systolic function (EF 60-65%), left ventricular diastolic dysfunction and normal right ventricular systolic function. Troponin 0.029>0.043>0.041>0.061. Bnp 430. Blood pressure 184/74 in the Emergency Room.     Review of patient's allergies indicates:   Allergen Reactions    Claritin [loratadine] Other (See Comments)     Double vision/spots    Hydrocodone-acetaminophen      wheezing    Naproxen      wheezing    Vibramycin [doxycycline calcium] Other (See Comments)     Weakness nausea   sob    Amlodipine      Myalgias      Fenofibrate      Causes muscle weakness    Hydralazine analogues      Muscle tremors/aches      Isosorbide     Lasix [furosemide]      myalgias    Moxifloxacin Other (See Comments)     Hives   muscle soreness    Timolol     Allegra [fexofenadine] Other (See Comments)     Double vision/spots     Codeine Diarrhea and Other (See Comments)     Loss of balance     Erythromycin Other (See Comments)     Complete weakness/could not walk    Hydrocortisone (bulk) Other (See Comments)     Only suppository/ caused muscle weakness    Macrolide antibiotics Other (See Comments)     Complete weakness/could not walk    Patanol [olopatadine] Other (See Comments)     Muscle weakness    Prednisone Anxiety    Statins-hmg-coa reductase inhibitors Other (See  Comments)     Muscle weakness    Xalatan [latanoprost] Other (See Comments)     Muscle weakness       Past Medical History:   Diagnosis Date    Arthritis     Back pain     Fuchs' corneal dystrophy     Glaucoma     Hyperlipidemia     Hypertension     Macular degeneration dry    Obesity     Trouble in sleeping     Urinary tract infection      Past Surgical History:   Procedure Laterality Date    ADENOIDECTOMY  1938    BREAST CYST EXCISION Left 1997 approx    CARPAL TUNNEL RELEASE Right 2012 approx    CATARACT EXTRACTION Bilateral 1994    CHOLECYSTECTOMY  1975    CORNEAL TRANSPLANT Bilateral 08/2015 8/2015 - left; Right 4/2016    EYE SURGERY      Fuch's Corneal dystrophy - had 2nd operation with Dr. Quintanilla    EYE SURGERY      Cataract wIOL    left thumb Left 2011    removed cuboid for arthritis    SLT- OS (aka TRABECULOPLASTY) Left 05/11/2011    repeat 3/14/12    TONSILLECTOMY  1938     Family History   Problem Relation Age of Onset    Heart disease Mother     Hypertension Mother     Cancer Father 88     sarcoma( ?K[place]osi's ) on leg    Deep vein thrombosis Neg Hx     Ovarian cancer Neg Hx     Breast cancer Neg Hx     Kidney disease Neg Hx     Strabismus Neg Hx     Retinal detachment Neg Hx     Macular degeneration Neg Hx     Glaucoma Neg Hx     Blindness Neg Hx     Amblyopia Neg Hx     Eczema Neg Hx     Lupus Neg Hx     Melanoma Neg Hx     Psoriasis Neg Hx     Diabetes Neg Hx     Stroke Neg Hx     Mental retardation Neg Hx     Mental illness Neg Hx     Hyperlipidemia Neg Hx     COPD Neg Hx     Asthma Neg Hx     Depression Neg Hx     Alcohol abuse Neg Hx     Drug abuse Neg Hx      Social History   Substance Use Topics    Smoking status: Never Smoker    Smokeless tobacco: Never Used      Comment: history of passive smoking from her ex-    Alcohol use 1.2 oz/week     2 Standard drinks or equivalent per week      Comment: 2 scotch drinks a week.         Review of  Systems:  Constitutional: positive for diaphoresis, negative for fever or chills   Eyes: negative  Ears, nose, mouth, throat, and face: negative  Respiratory: positive for shortness of breath, negative for orthopnea or PND  Cardiovascular: negative for chest pain, palpitations, syncope or near syncope  Gastrointestinal: negative for nausea or vomiting  Genitourinary:negative  Hematologic/lymphatic: negative  Musculoskeletal:negative,   Neurological: negative  Behavioral/Psych: negative  Endocrine: negative  Allergic/Immunologic: negative    OBJECTIVE:     Vital Signs (Most Recent)  Temp: 98.3 °F (36.8 °C) (05/14/17 0341)  Pulse: 75 (05/14/17 0848)  Resp: 18 (05/14/17 0848)  BP: (!) 163/79 (05/14/17 0848)  SpO2: 97 % (05/14/17 0848)    Vital Signs Range (Last 24H):  Temp:  [98.1 °F (36.7 °C)-98.7 °F (37.1 °C)]   Pulse:  [60-75]   Resp:  [18-20]   BP: (119-201)/(64-84)   SpO2:  [94 %-98 %]     Current Facility-Administered Medications   Medication    acetaminophen tablet 650 mg    albuterol-ipratropium 2.5mg-0.5mg/3mL nebulizer solution 3 mL    aluminum-magnesium hydroxide-simethicone 200-200-20 mg/5 mL suspension 30 mL    cloNIDine tablet 0.1 mg    diphenhydrAMINE capsule 25 mg    enoxaparin injection 40 mg    famotidine tablet 20 mg    guaifenesin 100 mg/5 ml syrup 200 mg    hydrALAZINE injection 10 mg    hydrochlorothiazide tablet 25 mg    losartan tablet 50 mg    loteprednol 0.5 % ophthalmic suspension 1 drop    metoprolol succinate (TOPROL-XL) 24 hr tablet 25 mg    ondansetron injection 4 mg     No current facility-administered medications on file prior to encounter.      Current Outpatient Prescriptions on File Prior to Encounter   Medication Sig    acetaminophen (TYLENOL) 500 MG tablet Take 500 mg by mouth once daily at 6am.    carvedilol (COREG) 25 MG tablet Take 1 tablet (25 mg total) by mouth 2 (two) times daily with meals. (Patient taking differently: Take 12.5 mg by mouth 2 (two) times  daily with meals. )    cloNIDine (CATAPRES) 0.1 MG tablet Take 1 tablet (0.1 mg total) by mouth 2 (two) times daily as needed (BP greater than 160/90). (Patient taking differently: Take 0.1 mg by mouth 2 (two) times daily as needed (BP greater than 160/90  takes one at 7 pm and one at 11 pm). )    loteprednol (LOTEMAX) 0.5 % ophthalmic suspension 1 drop once daily. One drop in the left eye and 1 drops in the right eye    metoprolol succinate (TOPROL-XL) 25 MG 24 hr tablet Take 1 tablet (25 mg total) by mouth 2 (two) times daily.    diphenhydramine-acetaminophen (TYLENOL PM)  mg Tab Take 1 tablet by mouth nightly as needed.    hydrochlorothiazide (HYDRODIURIL) 25 MG tablet Take 1 tablet (25 mg total) by mouth 2 (two) times daily.       Physical Exam:  General appearance: alert, appears stated age and cooperative  Head: Normocephalic, without obvious abnormality, atraumatic  Eyes:  conjunctivae/corneas clear. PERRL, EOM's intact. Fundi benign.  Nose: no discharge  Throat: normal findings: tongue midline and normal  Neck: no adenopathy, no carotid bruit, no JVD, supple, symmetrical, trachea midline and thyroid not enlarged, symmetric, no tenderness/mass/nodules  Back:  no skin lesions, erythema, or scars  Lungs:  clear to auscultation bilaterally  Chest wall: no tenderness  Heart: regular rate and rhythm, S1, S2 normal, no murmur, click, rub or gallop  Abdomen: soft, non-tender; bowel sounds normal; no masses,  no organomegaly  Extremities: extremities normal, atraumatic, no cyanosis or edema  Pulses: 1+ and symmetric  Skin: Skin color, texture, turgor normal. No rashes or lesions  Neurologic: Grossly normal    Laboratory:  Chemistry:   Lab Results   Component Value Date     05/14/2017    K 3.7 05/14/2017     05/14/2017    CO2 25 05/14/2017    BUN 22 05/14/2017    CREATININE 1.1 05/14/2017    CALCIUM 9.2 05/14/2017     Cardiac Markers:   Lab Results   Component Value Date    TROPONINI 0.041 (H)  05/14/2017     Cardiac Markers (Last 3):   Lab Results   Component Value Date    TROPONINI 0.041 (H) 05/14/2017    TROPONINI 0.043 (H) 05/13/2017    TROPONINI 0.029 (H) 05/13/2017     CBC:   Lab Results   Component Value Date    WBC 7.59 05/14/2017    HGB 11.7 (L) 05/14/2017    HCT 34.8 (L) 05/14/2017    MCV 96 05/14/2017     05/14/2017     Lipids:   Lab Results   Component Value Date    CHOL 258 (H) 01/04/2017    TRIG 184 (H) 01/04/2017    HDL 44 01/04/2017     Coagulation:   Lab Results   Component Value Date    INR 1.0 01/01/2017    APTT 28.2 01/01/2017       Diagnostic Results:  ECG: Reviewed  X-Ray: Reviewed  US: Reviewed        ASSESSMENT/PLAN:     Patient Active Problem List   Diagnosis    HTN (hypertension)    Hyperlipemia    Osteoarthritis of finger    Macular degeneration - Both Eyes    Fuch's endothelial dystrophy - Both Eyes    Lens replaced by other means    COAG (chronic open-angle glaucoma) - Both Eyes    Blepharitis, unspecified - Both Eyes    Palpitations    Abnormal ECG    PVC's (premature ventricular contractions)    Myopathy    LVH (left ventricular hypertrophy) due to hypertensive disease    Shoulder pain    Osteopenia    Sciatica    Myalgia    Calcification of aorta    Neuropathy    Chronic kidney disease, stage 3    Constipation, chronic    Elevated blood pressure reading    Medication side effects    Elevated troponin I level    Hypertensive urgency      Patient experiencing shortness of breath and mildly elevated troponin, probable related to hypertension.   She has history of multiple allergic reactions to blood pressure medications. Will need better control of HTN.     Plan:     Continue current medical management  Increase losartan 50 mg BiD  Increase Toprol XL 25 mg BiD  Continue HCTZ 25 mg daily   NM Pharm Stress Test tomorrow    Chart reviewed. Dr. Ya  agrees with plan as outlined above.

## 2017-05-14 NOTE — PLAN OF CARE
Problem: Patient Care Overview  Goal: Plan of Care Review  Outcome: Ongoing (interventions implemented as appropriate)  Patient currently resting quietly in bed. Patient awake, alert, oriented x4. Patient NSR on monitor at this time. Fall prevention reviewed with patient. Patient educated and encouraged to use the call light for any needs. Patient call light within reach. Patient free of falls. Patient has no complaints of pain at this time. Plan of care reviewed with patient. Patient has no further questions about plan of care at this time. Will continue to monitor.

## 2017-05-14 NOTE — ED NOTES
Pt resting in ER stretcher, aaox4, rr e/u, NAD noted.  Bed low and locked, call light in reach, side rails up x2. Pt verbalized understanding of status and POC; denies further needs. Will continue to monitor.

## 2017-05-14 NOTE — SUBJECTIVE & OBJECTIVE
Past Medical History:   Diagnosis Date    Arthritis     Back pain     Fuchs' corneal dystrophy     Glaucoma     Hyperlipidemia     Hypertension     Macular degeneration dry    Obesity     Trouble in sleeping     Urinary tract infection        Past Surgical History:   Procedure Laterality Date    ADENOIDECTOMY  1938    BREAST CYST EXCISION Left 1997 approx    CARPAL TUNNEL RELEASE Right 2012 approx    CATARACT EXTRACTION Bilateral 1994    CHOLECYSTECTOMY  1975    CORNEAL TRANSPLANT Bilateral 08/2015 8/2015 - left; Right 4/2016    EYE SURGERY      Fuch's Corneal dystrophy - had 2nd operation with Dr. Quintanilla    EYE SURGERY      Cataract wIOL    left thumb Left 2011    removed cuboid for arthritis    SLT- OS (aka TRABECULOPLASTY) Left 05/11/2011    repeat 3/14/12    TONSILLECTOMY  1938       Review of patient's allergies indicates:   Allergen Reactions    Claritin [loratadine] Other (See Comments)     Double vision/spots    Hydrocodone-acetaminophen      wheezing    Naproxen      wheezing    Vibramycin [doxycycline calcium] Other (See Comments)     Weakness nausea   sob    Amlodipine      Myalgias      Fenofibrate      Causes muscle weakness    Hydralazine analogues      Muscle tremors/aches      Isosorbide     Lasix [furosemide]      myalgias    Moxifloxacin Other (See Comments)     Hives   muscle soreness    Timolol     Allegra [fexofenadine] Other (See Comments)     Double vision/spots     Codeine Diarrhea and Other (See Comments)     Loss of balance     Erythromycin Other (See Comments)     Complete weakness/could not walk    Hydrocortisone (bulk) Other (See Comments)     Only suppository/ caused muscle weakness    Macrolide antibiotics Other (See Comments)     Complete weakness/could not walk    Patanol [olopatadine] Other (See Comments)     Muscle weakness    Prednisone Anxiety    Statins-hmg-coa reductase inhibitors Other (See Comments)     Muscle weakness    Xalatan  [latanoprost] Other (See Comments)     Muscle weakness       No current facility-administered medications on file prior to encounter.      Current Outpatient Prescriptions on File Prior to Encounter   Medication Sig    acetaminophen (TYLENOL) 500 MG tablet Take 500 mg by mouth once daily at 6am.    carvedilol (COREG) 25 MG tablet Take 1 tablet (25 mg total) by mouth 2 (two) times daily with meals. (Patient taking differently: Take 12.5 mg by mouth 2 (two) times daily with meals. )    cloNIDine (CATAPRES) 0.1 MG tablet Take 1 tablet (0.1 mg total) by mouth 2 (two) times daily as needed (BP greater than 160/90). (Patient taking differently: Take 0.1 mg by mouth 2 (two) times daily as needed (BP greater than 160/90  takes one at 7 pm and one at 11 pm). )    loteprednol (LOTEMAX) 0.5 % ophthalmic suspension 1 drop once daily. One drop in the left eye and 1 drops in the right eye    metoprolol succinate (TOPROL-XL) 25 MG 24 hr tablet Take 1 tablet (25 mg total) by mouth 2 (two) times daily.    diphenhydramine-acetaminophen (TYLENOL PM)  mg Tab Take 1 tablet by mouth nightly as needed.    hydrochlorothiazide (HYDRODIURIL) 25 MG tablet Take 1 tablet (25 mg total) by mouth 2 (two) times daily.     Family History     Problem Relation (Age of Onset)    Cancer Father (88)    Heart disease Mother    Hypertension Mother        Social History Main Topics    Smoking status: Never Smoker    Smokeless tobacco: Never Used      Comment: history of passive smoking from her ex-    Alcohol use 1.2 oz/week     2 Standard drinks or equivalent per week      Comment: 2 scotch drinks a week.     Drug use: No    Sexual activity: Yes     Partners: Male      Comment: discussed protection for STD's     Review of Systems   Constitutional: Positive for fatigue. Negative for chills, diaphoresis and fever.   HENT: Negative.  Negative for congestion, nosebleeds and sinus pressure.    Eyes: Negative.  Negative for photophobia,  redness and visual disturbance.   Respiratory: Positive for shortness of breath. Negative for cough, chest tightness and wheezing.    Cardiovascular: Negative.  Negative for chest pain, palpitations and leg swelling.   Gastrointestinal: Negative.  Negative for abdominal pain, diarrhea, nausea and vomiting.   Endocrine: Negative.  Negative for polydipsia, polyphagia and polyuria.   Genitourinary: Negative.  Negative for dysuria, flank pain, frequency and urgency.   Musculoskeletal: Negative.  Negative for back pain, joint swelling and neck stiffness.   Skin: Negative.  Negative for color change, pallor and rash.   Allergic/Immunologic: Negative.  Negative for environmental allergies, food allergies and immunocompromised state.   Neurological: Positive for dizziness. Negative for syncope, speech difficulty, numbness and headaches.   Hematological: Negative.  Negative for adenopathy. Does not bruise/bleed easily.   Psychiatric/Behavioral: Negative.  Negative for confusion, decreased concentration and hallucinations. The patient is not nervous/anxious.    All other systems reviewed and are negative.    Objective:     Vital Signs (Most Recent):  Temp: 98.7 °F (37.1 °C) (05/13/17 2138)  Pulse: 72 (05/13/17 2138)  Resp: 20 (05/13/17 2138)  BP: (!) 175/82 (05/13/17 2138)  SpO2: 98 % (05/13/17 2138) Vital Signs (24h Range):  Temp:  [98.1 °F (36.7 °C)-98.7 °F (37.1 °C)] 98.7 °F (37.1 °C)  Pulse:  [65-72] 72  Resp:  [20] 20  SpO2:  [94 %-98 %] 98 %  BP: (175-201)/(74-83) 175/82     Weight: 81.6 kg (180 lb)  Body mass index is 30.9 kg/(m^2).    Physical Exam   Constitutional: She is oriented to person, place, and time. She appears well-developed and well-nourished. No distress.   HENT:   Head: Normocephalic and atraumatic.   Eyes: Conjunctivae and EOM are normal. No scleral icterus.   Neck: Normal range of motion. Neck supple. No JVD present. No tracheal deviation present. No thyromegaly present.   Cardiovascular: Normal rate,  regular rhythm, normal heart sounds and intact distal pulses.    No murmur heard.  Pulmonary/Chest: Effort normal and breath sounds normal. No respiratory distress. She has no wheezes. She has no rales. She exhibits no tenderness.   Abdominal: Soft. Bowel sounds are normal. She exhibits no distension. There is no tenderness.   Musculoskeletal: Normal range of motion. She exhibits no edema or tenderness.   Lymphadenopathy:     She has no cervical adenopathy.   Neurological: She is alert and oriented to person, place, and time. No cranial nerve deficit. She exhibits normal muscle tone. Coordination normal.   Skin: Skin is warm and dry. She is not diaphoretic. No erythema.   Psychiatric: She has a normal mood and affect. Her behavior is normal. Judgment and thought content normal.   Nursing note and vitals reviewed.       Significant Labs:   BMP:   Recent Labs  Lab 05/13/17  1655   GLU 99      K 4.0      CO2 23   BUN 25*   CREATININE 1.2   CALCIUM 9.3     CBC:   Recent Labs  Lab 05/13/17  1655   WBC 6.04   HGB 12.2   HCT 36.2*        CMP:   Recent Labs  Lab 05/13/17  1655      K 4.0      CO2 23   GLU 99   BUN 25*   CREATININE 1.2   CALCIUM 9.3   PROT 7.2   ALBUMIN 3.9   BILITOT 0.3   ALKPHOS 62   AST 21   ALT 26   ANIONGAP 10   EGFRNONAA 42*     Cardiac Markers:   Recent Labs  Lab 05/13/17  1655   *     Troponin:   Recent Labs  Lab 05/13/17  1655 05/13/17  1925   TROPONINI 0.029* 0.043*     All pertinent labs within the past 24 hours have been reviewed.    Significant Imaging: I have reviewed and interpreted all pertinent imaging results/findings within the past 24 hours.     Imaging Results         X-Ray Chest PA And Lateral (Final result) Result time:  05/13/17 17:12:44    Final result by Lay Quinonez MD (05/13/17 17:12:44)    Impression:      Cardiomegaly.  No acute findings.      Electronically signed by: LAY QUINONEZ MD  Date:     05/13/17  Time:    17:12      Narrative:    Exam: XR CHEST PA AND LATERAL,    Date:  05/13/17 17:03:50    History: Shortness of breath.    Comparison:  01/09/2017.    Findings: Cardiomegaly is evident.  The lung fields appear clear with no obvious failure.

## 2017-05-14 NOTE — PROGRESS NOTES
Patient /82. Dr. Irby aware of patient BPs. MD ordered scheduled and prn BP medication. MD orders to recheck BP at midnight vitals and administer prn BP medication according. Patient currently resting in bed with no complaints of headache or chest pain. No distress noted. Will continue to monitor.

## 2017-05-15 LAB
ALBUMIN SERPL BCP-MCNC: 3.6 G/DL
ALP SERPL-CCNC: 58 U/L
ALT SERPL W/O P-5'-P-CCNC: 18 U/L
ANION GAP SERPL CALC-SCNC: 10 MMOL/L
AST SERPL-CCNC: 15 U/L
BASOPHILS # BLD AUTO: 0.03 K/UL
BASOPHILS NFR BLD: 0.4 %
BILIRUB SERPL-MCNC: 0.9 MG/DL
BUN SERPL-MCNC: 24 MG/DL
CALCIUM SERPL-MCNC: 9.3 MG/DL
CHLORIDE SERPL-SCNC: 106 MMOL/L
CO2 SERPL-SCNC: 26 MMOL/L
CREAT SERPL-MCNC: 1.3 MG/DL
DIASTOLIC DYSFUNCTION: NO
DIFFERENTIAL METHOD: ABNORMAL
EOSINOPHIL # BLD AUTO: 0.1 K/UL
EOSINOPHIL NFR BLD: 1 %
ERYTHROCYTE [DISTWIDTH] IN BLOOD BY AUTOMATED COUNT: 13.8 %
EST. GFR  (AFRICAN AMERICAN): 44 ML/MIN/1.73 M^2
EST. GFR  (NON AFRICAN AMERICAN): 38 ML/MIN/1.73 M^2
ESTIMATED AVG GLUCOSE: 134 MG/DL
GLUCOSE SERPL-MCNC: 106 MG/DL
HBA1C MFR BLD HPLC: 6.3 %
HCT VFR BLD AUTO: 33.8 %
HGB BLD-MCNC: 11.6 G/DL
LYMPHOCYTES # BLD AUTO: 1.3 K/UL
LYMPHOCYTES NFR BLD: 15.9 %
MCH RBC QN AUTO: 32.9 PG
MCHC RBC AUTO-ENTMCNC: 34.3 %
MCV RBC AUTO: 96 FL
MONOCYTES # BLD AUTO: 1 K/UL
MONOCYTES NFR BLD: 12.2 %
NEUTROPHILS # BLD AUTO: 5.8 K/UL
NEUTROPHILS NFR BLD: 70.5 %
PLATELET # BLD AUTO: 192 K/UL
PMV BLD AUTO: 9.9 FL
POTASSIUM SERPL-SCNC: 3.7 MMOL/L
PROT SERPL-MCNC: 6.6 G/DL
RBC # BLD AUTO: 3.53 M/UL
SODIUM SERPL-SCNC: 142 MMOL/L
WBC # BLD AUTO: 8.17 K/UL

## 2017-05-15 PROCEDURE — 63600175 PHARM REV CODE 636 W HCPCS: Performed by: INTERNAL MEDICINE

## 2017-05-15 PROCEDURE — 80053 COMPREHEN METABOLIC PANEL: CPT

## 2017-05-15 PROCEDURE — 21400001 HC TELEMETRY ROOM

## 2017-05-15 PROCEDURE — 78452 HT MUSCLE IMAGE SPECT MULT: CPT | Mod: 26,,, | Performed by: INTERNAL MEDICINE

## 2017-05-15 PROCEDURE — 25000003 PHARM REV CODE 250: Performed by: NURSE PRACTITIONER

## 2017-05-15 PROCEDURE — 36415 COLL VENOUS BLD VENIPUNCTURE: CPT

## 2017-05-15 PROCEDURE — 85025 COMPLETE CBC W/AUTO DIFF WBC: CPT

## 2017-05-15 PROCEDURE — 99232 SBSQ HOSP IP/OBS MODERATE 35: CPT | Mod: ,,, | Performed by: INTERNAL MEDICINE

## 2017-05-15 PROCEDURE — 25000003 PHARM REV CODE 250: Performed by: INTERNAL MEDICINE

## 2017-05-15 PROCEDURE — 93017 CV STRESS TEST TRACING ONLY: CPT

## 2017-05-15 PROCEDURE — 93016 CV STRESS TEST SUPVJ ONLY: CPT | Mod: ,,, | Performed by: INTERNAL MEDICINE

## 2017-05-15 PROCEDURE — 63600175 PHARM REV CODE 636 W HCPCS: Performed by: NURSE PRACTITIONER

## 2017-05-15 PROCEDURE — 25000003 PHARM REV CODE 250: Performed by: EMERGENCY MEDICINE

## 2017-05-15 PROCEDURE — 93018 CV STRESS TEST I&R ONLY: CPT | Mod: ,,, | Performed by: INTERNAL MEDICINE

## 2017-05-15 RX ORDER — DIPHENHYDRAMINE HCL 25 MG
25 CAPSULE ORAL ONCE
Status: COMPLETED | OUTPATIENT
Start: 2017-05-15 | End: 2017-05-15

## 2017-05-15 RX ORDER — ALPRAZOLAM 0.5 MG/1
0.5 TABLET ORAL 2 TIMES DAILY PRN
Status: DISCONTINUED | OUTPATIENT
Start: 2017-05-15 | End: 2017-05-16 | Stop reason: HOSPADM

## 2017-05-15 RX ORDER — REGADENOSON 0.08 MG/ML
0.4 INJECTION, SOLUTION INTRAVENOUS ONCE
Status: COMPLETED | OUTPATIENT
Start: 2017-05-15 | End: 2017-05-15

## 2017-05-15 RX ORDER — FAMOTIDINE 20 MG/1
20 TABLET, FILM COATED ORAL DAILY
Status: DISCONTINUED | OUTPATIENT
Start: 2017-05-16 | End: 2017-05-16 | Stop reason: HOSPADM

## 2017-05-15 RX ORDER — LORAZEPAM 2 MG/ML
0.5 INJECTION INTRAMUSCULAR ONCE
Status: COMPLETED | OUTPATIENT
Start: 2017-05-15 | End: 2017-05-15

## 2017-05-15 RX ADMIN — ALPRAZOLAM 0.5 MG: 0.5 TABLET ORAL at 08:05

## 2017-05-15 RX ADMIN — LORAZEPAM 0.5 MG: 2 INJECTION INTRAMUSCULAR; INTRAVENOUS at 02:05

## 2017-05-15 RX ADMIN — DIPHENHYDRAMINE HYDROCHLORIDE 25 MG: 25 CAPSULE ORAL at 02:05

## 2017-05-15 RX ADMIN — LOSARTAN POTASSIUM 50 MG: 50 TABLET, FILM COATED ORAL at 09:05

## 2017-05-15 RX ADMIN — FAMOTIDINE 20 MG: 20 TABLET ORAL at 09:05

## 2017-05-15 RX ADMIN — ACETAMINOPHEN 650 MG: 325 TABLET ORAL at 08:05

## 2017-05-15 RX ADMIN — DIPHENHYDRAMINE HYDROCHLORIDE 25 MG: 25 CAPSULE ORAL at 09:05

## 2017-05-15 RX ADMIN — LOSARTAN POTASSIUM 50 MG: 50 TABLET, FILM COATED ORAL at 08:05

## 2017-05-15 RX ADMIN — METOPROLOL SUCCINATE 25 MG: 25 TABLET, EXTENDED RELEASE ORAL at 08:05

## 2017-05-15 RX ADMIN — METOPROLOL SUCCINATE 25 MG: 25 TABLET, EXTENDED RELEASE ORAL at 09:05

## 2017-05-15 RX ADMIN — ENOXAPARIN SODIUM 40 MG: 100 INJECTION SUBCUTANEOUS at 05:05

## 2017-05-15 RX ADMIN — REGADENOSON 0.4 MG: 0.08 INJECTION, SOLUTION INTRAVENOUS at 11:05

## 2017-05-15 NOTE — PLAN OF CARE
05/15/17 1009   Discharge Assessment   Assessment Type Discharge Planning Assessment   Confirmed/corrected address and phone number on facesheet? Yes   Assessment information obtained from? Patient;Medical Record   Expected Length of Stay (days) (TBD)   Prior to hospitilization cognitive status: Alert/Oriented   Prior to hospitalization functional status: Assistive Equipment   Current cognitive status: Alert/Oriented   Current Functional Status: Assistive Equipment   Arrived From assisted living;home or self-care   Lives With alone   Able to Return to Prior Arrangements yes   Is patient able to care for self after discharge? Yes   Patient's perception of discharge disposition home or selfcare;assisted living   Readmission Within The Last 30 Days no previous admission in last 30 days   Patient currently being followed by outpatient case management? No   Patient currently receives home health services? No   Does the patient currently use HME? Yes   Equipment Currently Used at Home rollator;grab bar   Do you have any problems affording any of your prescribed medications? No   Is the patient taking medications as prescribed? yes   Do you have any financial concerns preventing you from receiving the healthcare you need? No   Does the patient have transportation to healthcare appointments? Yes   Transportation Available car;agency transportation  (Still drives; however, she has access to transportation at the assisted  living. )   On Dialysis? No   Discharge Plan A Assisted Living  ([facility])   Discharge Plan B Home Health   Patient/Family In Agreement With Plan yes

## 2017-05-15 NOTE — ASSESSMENT & PLAN NOTE
- Improving, currently 148/78  - Denies chest pain, mild SOB -- SOB may be related to anxiety  - Cardiology following  - Continue ARB, BB, Hctz  - BP tend to increase overnight. Will monitor overnight and if BP stable, will discharge in AM

## 2017-05-15 NOTE — ASSESSMENT & PLAN NOTE
- Likely related to uncontrolled HTN  - Patient denies chest pain but reports mild SOB  - NM stress test negative  - Will continue to monitor

## 2017-05-15 NOTE — PLAN OF CARE
"Problem: Patient Care Overview  Goal: Plan of Care Review  Outcome: Ongoing (interventions implemented as appropriate)  Pt oriented x 4, underwent nuclear stress test today; negative findings, blood pressure stabilizing last reading 148/78, if continuing to stabilize, possible discharge in the a.m., antianxiety medication as needed overnight, pt refused HCTZ med this a.m. D/t "keeping her going to the bathroom", pt educated on the therapeutic reasons to take medication, pt continue to refuse, Pt c/o slight SOB on exertion, mild nausea after stress test, Zofran offered, pt refused, fall free this shift, no distress noted, calm environment promoted, plan of care discussed with pt.       "

## 2017-05-15 NOTE — PROGRESS NOTES
Patient /72. Prn hydralazine administered per order. Patient reports not feeling well. Patient complains of bilateral tingling pain in both upper extremities. Patient currently flushed in the face with a temp of 100 orally. Patient BP now 144/100. Patient resting in bed. No complaint of headache. Dr. Irby notified. MD ordered to administer prn benadryll order. Patient BP now 172/87. Patient O2 sat 96%.

## 2017-05-15 NOTE — PLAN OF CARE
Problem: Patient Care Overview  Goal: Plan of Care Review  Outcome: Ongoing (interventions implemented as appropriate)  Patient currently resting quietly in bed. Patient awake, alert, oriented x4. VS currently stable. Patient NSR on monitor at this time. Patient receiving O2 @ 2L nasal cannula. Fall prevention reviewed with patient. Patient educated and encouraged to use the call light for any needs. Patient call light within reach. Patient free of falls. Patient has no complaints of pain at this time. Plan of care reviewed with patient. Patient has no further questions about plan of care at this time. Will continue to monitor.

## 2017-05-15 NOTE — PROGRESS NOTES
Patient complaining of feeling anxious and like she can not sleep. Patient reports having trouble breathing and congested. Patient currently sitting at bedside restless. Dr. Irby notified of patient anxiety episode. MD orders a one time dose of 25mg benadryl. MD also orders one time dose of ativan 0.5mg IV push. Medication administered. Patient currently back in bed attempting to rest. Will continue to monitor.

## 2017-05-15 NOTE — PROGRESS NOTES
Patient BP currently 163/86. Patient denies any headache and is asymptomatic resting in bed, reading a book. Patient refuses to take her prn clonidine because she states it has been causes her muscle aches and her lips to swell. Will continue to monitor.

## 2017-05-15 NOTE — SUBJECTIVE & OBJECTIVE
"Interval History: Pt seen and examined. Pt reports improvement with SOB, "not like it was last night." Pt denies chest pain. Will monitor BP overnight and discharge in AM if BP stable    Review of Systems   Constitutional: Negative for appetite change, chills and fever.   HENT: Negative for trouble swallowing and voice change.    Eyes: Negative.    Respiratory: Positive for shortness of breath. Negative for apnea, cough, choking, chest tightness, wheezing and stridor.    Cardiovascular: Negative for chest pain, palpitations and leg swelling.   Gastrointestinal: Negative for abdominal pain, constipation, diarrhea, nausea and vomiting.   Endocrine: Negative.    Genitourinary: Negative.    Musculoskeletal: Negative.    Skin: Negative.    Allergic/Immunologic: Negative.    Neurological: Negative for dizziness, tremors, seizures, syncope, facial asymmetry, speech difficulty, weakness, light-headedness, numbness and headaches.   Hematological: Negative.    Psychiatric/Behavioral: Negative.    All other systems reviewed and are negative.    Objective:     Vital Signs (Most Recent):  Temp: 98.5 °F (36.9 °C) (05/15/17 1600)  Pulse: 75 (05/15/17 1600)  Resp: 18 (05/15/17 1600)  BP: (!) 148/78 (05/15/17 1600)  SpO2: 95 % (05/15/17 1600) Vital Signs (24h Range):  Temp:  [98.5 °F (36.9 °C)-99.6 °F (37.6 °C)] 98.5 °F (36.9 °C)  Pulse:  [63-82] 75  Resp:  [16-19] 18  SpO2:  [94 %-97 %] 95 %  BP: (134-182)/() 148/78     Weight: 81.6 kg (180 lb)  Body mass index is 30.9 kg/(m^2).    Intake/Output Summary (Last 24 hours) at 05/15/17 1612  Last data filed at 05/15/17 1200   Gross per 24 hour   Intake             1080 ml   Output              200 ml   Net              880 ml      Physical Exam   Constitutional: She is oriented to person, place, and time. She appears well-developed and well-nourished.   HENT:   Head: Normocephalic and atraumatic.   Eyes: Conjunctivae and EOM are normal.   Neck: Normal range of motion. Neck supple. "   Cardiovascular: Normal rate, regular rhythm, normal heart sounds and intact distal pulses.    No murmur heard.  Pulmonary/Chest: Effort normal and breath sounds normal. No respiratory distress. She has no wheezes.   Abdominal: Soft. Bowel sounds are normal. She exhibits no distension. There is no tenderness.   Musculoskeletal: Normal range of motion.   Neurological: She is alert and oriented to person, place, and time.   Skin: Skin is warm and dry.   Psychiatric: She has a normal mood and affect. Her behavior is normal. Judgment and thought content normal.   Vitals reviewed.      Significant Labs:   CBC:   Recent Labs  Lab 05/13/17 1655 05/14/17  0550 05/15/17  0521   WBC 6.04 7.59 8.17   HGB 12.2 11.7* 11.6*   HCT 36.2* 34.8* 33.8*    194 192     CMP:   Recent Labs  Lab 05/13/17 1655 05/14/17  0550 05/15/17  0521    141 142   K 4.0 3.7 3.7    108 106   CO2 23 25 26   GLU 99 110 106   BUN 25* 22 24*   CREATININE 1.2 1.1 1.3   CALCIUM 9.3 9.2 9.3   PROT 7.2 6.6 6.6   ALBUMIN 3.9 3.7 3.6   BILITOT 0.3 0.7 0.9   ALKPHOS 62 55 58   AST 21 18 15   ALT 26 22 18   ANIONGAP 10 8 10   EGFRNONAA 42* 46* 38*       Significant Imaging:   Imaging Results         NM Myocardial Perfusion Spect Multi Pharmacologic (In process)         X-Ray Chest PA And Lateral (Final result) Result time:  05/13/17 17:12:44    Final result by Lay Quinonez MD (05/13/17 17:12:44)    Impression:      Cardiomegaly.  No acute findings.      Electronically signed by: LAY QUINONEZ MD  Date:     05/13/17  Time:    17:12     Narrative:    Exam: XR CHEST PA AND LATERAL,    Date:  05/13/17 17:03:50    History: Shortness of breath.    Comparison:  01/09/2017.    Findings: Cardiomegaly is evident.  The lung fields appear clear with no obvious failure.

## 2017-05-15 NOTE — PROGRESS NOTES
Cardiology Progress Note        SUBJECTIVE:     History of Present Illness:  Patient is a 84 y.o. female presents with shortness of breath. She is feeling better today, blood pressure was elevated during the night. Vital signs have been stable today.       OBJECTIVE:     Vital Signs (Most Recent)  Temp: 99.2 °F (37.3 °C) (05/15/17 1131)  Pulse: 72 (05/15/17 1131)  Resp: 19 (05/15/17 1131)  BP: (!) 143/60 (05/15/17 1131)  SpO2: 95 % (05/15/17 1131)    Vital Signs Range (Last 24H):  Temp:  [98.9 °F (37.2 °C)-99.6 °F (37.6 °C)]   Pulse:  [63-82]   Resp:  [16-19]   BP: (134-182)/()   SpO2:  [94 %-97 %]     Intake/Output last 3 shifts:  I/O last 3 completed shifts:  In: 1920 [P.O.:1920]  Out: 800 [Urine:800]    Intake/Output this shift:       Review of patient's allergies indicates:   Allergen Reactions    Claritin [loratadine] Other (See Comments)     Double vision/spots    Hydrocodone-acetaminophen      wheezing    Naproxen      wheezing    Vibramycin [doxycycline calcium] Other (See Comments)     Weakness nausea   sob    Amlodipine      Myalgias      Fenofibrate      Causes muscle weakness    Hydralazine analogues      Muscle tremors/aches      Isosorbide     Lasix [furosemide]      myalgias    Moxifloxacin Other (See Comments)     Hives   muscle soreness    Timolol     Allegra [fexofenadine] Other (See Comments)     Double vision/spots     Codeine Diarrhea and Other (See Comments)     Loss of balance     Erythromycin Other (See Comments)     Complete weakness/could not walk    Hydrocortisone (bulk) Other (See Comments)     Only suppository/ caused muscle weakness    Macrolide antibiotics Other (See Comments)     Complete weakness/could not walk    Patanol [olopatadine] Other (See Comments)     Muscle weakness    Prednisone Anxiety    Statins-hmg-coa reductase inhibitors Other (See Comments)     Muscle weakness    Xalatan [latanoprost] Other (See Comments)     Muscle weakness       Current  Facility-Administered Medications   Medication    acetaminophen tablet 650 mg    albuterol-ipratropium 2.5mg-0.5mg/3mL nebulizer solution 3 mL    aluminum-magnesium hydroxide-simethicone 200-200-20 mg/5 mL suspension 30 mL    cloNIDine tablet 0.1 mg    diphenhydrAMINE capsule 25 mg    enoxaparin injection 40 mg    famotidine tablet 20 mg    guaifenesin 100 mg/5 ml syrup 200 mg    hydrALAZINE injection 10 mg    hydrochlorothiazide tablet 25 mg    losartan tablet 50 mg    loteprednol etabonate 0.5 % DrpG 1 drop    metoprolol succinate (TOPROL-XL) 24 hr tablet 25 mg    ondansetron injection 4 mg     No current facility-administered medications on file prior to encounter.      Current Outpatient Prescriptions on File Prior to Encounter   Medication Sig    acetaminophen (TYLENOL) 500 MG tablet Take 500 mg by mouth once daily at 6am.    carvedilol (COREG) 25 MG tablet Take 1 tablet (25 mg total) by mouth 2 (two) times daily with meals. (Patient taking differently: Take 12.5 mg by mouth 2 (two) times daily with meals. )    cloNIDine (CATAPRES) 0.1 MG tablet Take 1 tablet (0.1 mg total) by mouth 2 (two) times daily as needed (BP greater than 160/90). (Patient taking differently: Take 0.1 mg by mouth 2 (two) times daily as needed (BP greater than 160/90  takes one at 7 pm and one at 11 pm). )    loteprednol (LOTEMAX) 0.5 % ophthalmic suspension 1 drop once daily. One drop in the left eye and 1 drops in the right eye    metoprolol succinate (TOPROL-XL) 25 MG 24 hr tablet Take 1 tablet (25 mg total) by mouth 2 (two) times daily.    diphenhydramine-acetaminophen (TYLENOL PM)  mg Tab Take 1 tablet by mouth nightly as needed.    hydrochlorothiazide (HYDRODIURIL) 25 MG tablet Take 1 tablet (25 mg total) by mouth 2 (two) times daily.       Physical Exam:  General appearance: alert, appears stated age and cooperative  Head: Normocephalic, without obvious abnormality, atraumatic  Eyes:  conjunctivae/corneas  clear. PERRL, EOM's intact. Fundi benign.  Nose: no discharge  Throat: normal findings: tongue midline and normal  Neck: no adenopathy, no carotid bruit, no JVD, supple, symmetrical, trachea midline and thyroid not enlarged, symmetric, no tenderness/mass/nodules  Back:  no skin lesions, erythema, or scars  Lungs:  clear to auscultation bilaterally  Chest wall: no tenderness  Heart: regular rate and rhythm, S1, S2 normal, no murmur, click, rub or gallop  Abdomen: soft, non-tender; bowel sounds normal; no masses,  no organomegaly  Extremities: extremities normal, atraumatic, no cyanosis or edema  Pulses: 1+ and symmetric  Skin: Skin color, texture, turgor normal. No rashes or lesions  Neurologic: Grossly normal    Laboratory:  Chemistry:   Lab Results   Component Value Date     05/15/2017    K 3.7 05/15/2017     05/15/2017    CO2 26 05/15/2017    BUN 24 (H) 05/15/2017    CREATININE 1.3 05/15/2017    CALCIUM 9.3 05/15/2017     Cardiac Markers:   Lab Results   Component Value Date    TROPONINI 0.061 (H) 05/14/2017     Cardiac Markers (Last 3):   Lab Results   Component Value Date    TROPONINI 0.061 (H) 05/14/2017    TROPONINI 0.041 (H) 05/14/2017    TROPONINI 0.043 (H) 05/13/2017     CBC:   Lab Results   Component Value Date    WBC 8.17 05/15/2017    HGB 11.6 (L) 05/15/2017    HCT 33.8 (L) 05/15/2017    MCV 96 05/15/2017     05/15/2017     Lipids:   Lab Results   Component Value Date    CHOL 258 (H) 01/04/2017    TRIG 184 (H) 01/04/2017    HDL 44 01/04/2017     Coagulation:   Lab Results   Component Value Date    INR 1.0 01/01/2017    APTT 28.2 01/01/2017       Diagnostic Results:  ECG: Reviewed  X-Ray: Reviewed  US: Reviewed  CT: Reviewed  Echo: Reviewed      ASSESSMENT/PLAN:     Patient Active Problem List   Diagnosis    HTN (hypertension)    Hyperlipemia    Osteoarthritis of finger    Macular degeneration - Both Eyes    Fuch's endothelial dystrophy - Both Eyes    Lens replaced by other means     COAG (chronic open-angle glaucoma) - Both Eyes    Blepharitis, unspecified - Both Eyes    Palpitations    Abnormal ECG    PVC's (premature ventricular contractions)    Myopathy    LVH (left ventricular hypertrophy) due to hypertensive disease    Shoulder pain    Osteopenia    Sciatica    Myalgia    Calcification of aorta    Neuropathy    Chronic kidney disease, stage 3    Constipation, chronic    Elevated blood pressure reading    Medication side effects    Elevated troponin I level    Hypertensive urgency      Patient doing better today. BP medications were adjusted on yesterday.  Blood pressure stable today, she reports its elevated at night. NM Pharm Stress Test today normal.     Plan:     Will continue current medications and treatment plan  Risk modification   Low sodium diet  Continue Toprol XL, Losartan and Clonidine PRN   Follow in Cardiology Clinic in one week after discharge.     Chart reviewed. Dr. Samson agrees with plan as outlined above.

## 2017-05-15 NOTE — PROGRESS NOTES
"[facility] Hospital Medicine  Progress Note    Patient Name: Shirley Metz  MRN: [id number]  Patient Class: IP- Inpatient   Admission Date: 5/13/2017  Length of Stay: 2 days  Attending Physician: Ric Riley MD  Primary Care Provider: Yandy Terrell MD        Subjective:     Principal Problem:Hypertensive urgency    HPI:  Ms. Bettencourt is a 83 y/o  female with h/o HTN presents to the ED c/o worsening SOB and uncontrolled hypertension for the past 2-3 weeks associated with dry mouth, increasing fatigue since starting coreg. Patient reports intolerance to multiple anti-hypertensive medications. In the ED, her BP was elevated at 201/83, repeat 175/82, however troponin initially elevated at 0.029, increased to 0.043. EKG unremarkable. Patient denies chest pain.     Hospital Course:  Shirley Bettencourt is an 84 year old female admitted for hypertensive urgency. Cardiology consulted. Patient treated with HCTZ, Losartan, Metoprolol, and PRN antihypertensives. EKG unremarkable. Troponin trending up. 2D ECHO with EF 60-65% with DD and pulmonary HTN. NM stress test negative today. Blood pressure stable. Patient denies chest pain but reports mild SOB but improved from last night. Will monitor BP overnight and discharge in the AM.     Interval History: Pt seen and examined. Pt reports improvement with SOB, "not like it was last night." Pt denies chest pain. Will monitor BP overnight and discharge in AM if BP stable    Review of Systems   Constitutional: Negative for appetite change, chills and fever.   HENT: Negative for trouble swallowing and voice change.    Eyes: Negative.    Respiratory: Positive for shortness of breath. Negative for apnea, cough, choking, chest tightness, wheezing and stridor.    Cardiovascular: Negative for chest pain, palpitations and leg swelling.   Gastrointestinal: Negative for abdominal pain, constipation, diarrhea, nausea and vomiting.   Endocrine: Negative.  "   Genitourinary: Negative.    Musculoskeletal: Negative.    Skin: Negative.    Allergic/Immunologic: Negative.    Neurological: Negative for dizziness, tremors, seizures, syncope, facial asymmetry, speech difficulty, weakness, light-headedness, numbness and headaches.   Hematological: Negative.    Psychiatric/Behavioral: Negative.    All other systems reviewed and are negative.    Objective:     Vital Signs (Most Recent):  Temp: 98.5 °F (36.9 °C) (05/15/17 1600)  Pulse: 75 (05/15/17 1600)  Resp: 18 (05/15/17 1600)  BP: (!) 148/78 (05/15/17 1600)  SpO2: 95 % (05/15/17 1600) Vital Signs (24h Range):  Temp:  [98.5 °F (36.9 °C)-99.6 °F (37.6 °C)] 98.5 °F (36.9 °C)  Pulse:  [63-82] 75  Resp:  [16-19] 18  SpO2:  [94 %-97 %] 95 %  BP: (134-182)/() 148/78     Weight: 81.6 kg (180 lb)  Body mass index is 30.9 kg/(m^2).    Intake/Output Summary (Last 24 hours) at 05/15/17 1612  Last data filed at 05/15/17 1200   Gross per 24 hour   Intake             1080 ml   Output              200 ml   Net              880 ml      Physical Exam   Constitutional: She is oriented to person, place, and time. She appears well-developed and well-nourished.   HENT:   Head: Normocephalic and atraumatic.   Eyes: Conjunctivae and EOM are normal.   Neck: Normal range of motion. Neck supple.   Cardiovascular: Normal rate, regular rhythm, normal heart sounds and intact distal pulses.    No murmur heard.  Pulmonary/Chest: Effort normal and breath sounds normal. No respiratory distress. She has no wheezes.   Abdominal: Soft. Bowel sounds are normal. She exhibits no distension. There is no tenderness.   Musculoskeletal: Normal range of motion.   Neurological: She is alert and oriented to person, place, and time.   Skin: Skin is warm and dry.   Psychiatric: She has a normal mood and affect. Her behavior is normal. Judgment and thought content normal.   Vitals reviewed.      Significant Labs:   CBC:   Recent Labs  Lab 05/13/17  1655 05/14/17  0550  05/15/17  0521   WBC 6.04 7.59 8.17   HGB 12.2 11.7* 11.6*   HCT 36.2* 34.8* 33.8*    194 192     CMP:   Recent Labs  Lab 05/13/17  1655 05/14/17  0550 05/15/17  0521    141 142   K 4.0 3.7 3.7    108 106   CO2 23 25 26   GLU 99 110 106   BUN 25* 22 24*   CREATININE 1.2 1.1 1.3   CALCIUM 9.3 9.2 9.3   PROT 7.2 6.6 6.6   ALBUMIN 3.9 3.7 3.6   BILITOT 0.3 0.7 0.9   ALKPHOS 62 55 58   AST 21 18 15   ALT 26 22 18   ANIONGAP 10 8 10   EGFRNONAA 42* 46* 38*       Significant Imaging:   Imaging Results         NM Myocardial Perfusion Spect Multi Pharmacologic (In process)         X-Ray Chest PA And Lateral (Final result) Result time:  05/13/17 17:12:44    Final result by Lay Quinonez MD (05/13/17 17:12:44)    Impression:      Cardiomegaly.  No acute findings.      Electronically signed by: LAY QUINONEZ MD  Date:     05/13/17  Time:    17:12     Narrative:    Exam: XR CHEST PA AND LATERAL,    Date:  05/13/17 17:03:50    History: Shortness of breath.    Comparison:  01/09/2017.    Findings: Cardiomegaly is evident.  The lung fields appear clear with no obvious failure.            Assessment/Plan:      * Hypertensive urgency  - Improving, currently 148/78  - Denies chest pain, mild SOB -- SOB may be related to anxiety  - Cardiology following  - Continue ARB, BB, Hctz  - BP tend to increase overnight. Will monitor overnight and if BP stable, will discharge in AM    Elevated troponin I level  - Likely related to uncontrolled HTN  - Patient denies chest pain but reports mild SOB  - NM stress test negative  - Will continue to monitor        VTE Risk Mitigation         Ordered     enoxaparin injection 40 mg  Daily     Route:  Subcutaneous        05/13/17 2158     Medium Risk of VTE  Once      05/13/17 2135          FABIO Roblero  Department of Hospital Medicine   [facility]

## 2017-05-16 VITALS
BODY MASS INDEX: 30.05 KG/M2 | HEART RATE: 79 BPM | DIASTOLIC BLOOD PRESSURE: 75 MMHG | OXYGEN SATURATION: 95 % | TEMPERATURE: 99 F | HEIGHT: 64 IN | SYSTOLIC BLOOD PRESSURE: 164 MMHG | WEIGHT: 176 LBS | RESPIRATION RATE: 18 BRPM

## 2017-05-16 LAB
ALBUMIN SERPL BCP-MCNC: 3.7 G/DL
ALP SERPL-CCNC: 63 U/L
ALT SERPL W/O P-5'-P-CCNC: 18 U/L
ANION GAP SERPL CALC-SCNC: 10 MMOL/L
AST SERPL-CCNC: 15 U/L
BASOPHILS # BLD AUTO: 0.02 K/UL
BASOPHILS NFR BLD: 0.2 %
BILIRUB SERPL-MCNC: 1 MG/DL
BUN SERPL-MCNC: 27 MG/DL
CALCIUM SERPL-MCNC: 9.8 MG/DL
CHLORIDE SERPL-SCNC: 105 MMOL/L
CO2 SERPL-SCNC: 26 MMOL/L
CREAT SERPL-MCNC: 1.3 MG/DL
DIFFERENTIAL METHOD: ABNORMAL
EOSINOPHIL # BLD AUTO: 0.1 K/UL
EOSINOPHIL NFR BLD: 1.6 %
ERYTHROCYTE [DISTWIDTH] IN BLOOD BY AUTOMATED COUNT: 13.9 %
EST. GFR  (AFRICAN AMERICAN): 44 ML/MIN/1.73 M^2
EST. GFR  (NON AFRICAN AMERICAN): 38 ML/MIN/1.73 M^2
GLUCOSE SERPL-MCNC: 95 MG/DL
HCT VFR BLD AUTO: 37.3 %
HGB BLD-MCNC: 12.5 G/DL
LYMPHOCYTES # BLD AUTO: 2.1 K/UL
LYMPHOCYTES NFR BLD: 23.7 %
MCH RBC QN AUTO: 32.1 PG
MCHC RBC AUTO-ENTMCNC: 33.5 %
MCV RBC AUTO: 96 FL
MONOCYTES # BLD AUTO: 1.3 K/UL
MONOCYTES NFR BLD: 14.8 %
NEUTROPHILS # BLD AUTO: 5.2 K/UL
NEUTROPHILS NFR BLD: 59.7 %
PLATELET # BLD AUTO: 227 K/UL
PMV BLD AUTO: 9.9 FL
POTASSIUM SERPL-SCNC: 3.6 MMOL/L
PROT SERPL-MCNC: 7.3 G/DL
RBC # BLD AUTO: 3.9 M/UL
SODIUM SERPL-SCNC: 141 MMOL/L
WBC # BLD AUTO: 8.66 K/UL

## 2017-05-16 PROCEDURE — 85025 COMPLETE CBC W/AUTO DIFF WBC: CPT

## 2017-05-16 PROCEDURE — 36415 COLL VENOUS BLD VENIPUNCTURE: CPT

## 2017-05-16 PROCEDURE — 25000003 PHARM REV CODE 250: Performed by: INTERNAL MEDICINE

## 2017-05-16 PROCEDURE — 25000003 PHARM REV CODE 250: Performed by: NURSE PRACTITIONER

## 2017-05-16 PROCEDURE — 80053 COMPREHEN METABOLIC PANEL: CPT

## 2017-05-16 PROCEDURE — 63600175 PHARM REV CODE 636 W HCPCS: Performed by: INTERNAL MEDICINE

## 2017-05-16 RX ORDER — LOSARTAN POTASSIUM 50 MG/1
50 TABLET ORAL 2 TIMES DAILY
Qty: 60 TABLET | Refills: 1 | Status: SHIPPED | OUTPATIENT
Start: 2017-05-16 | End: 2017-05-23 | Stop reason: SDUPTHER

## 2017-05-16 RX ORDER — ALPRAZOLAM 0.5 MG/1
0.5 TABLET ORAL 2 TIMES DAILY PRN
Qty: 4 TABLET | Refills: 0 | Status: SHIPPED | OUTPATIENT
Start: 2017-05-16 | End: 2017-05-22 | Stop reason: SDUPTHER

## 2017-05-16 RX ADMIN — HYDRALAZINE HYDROCHLORIDE 10 MG: 20 INJECTION INTRAMUSCULAR; INTRAVENOUS at 08:05

## 2017-05-16 RX ADMIN — METOPROLOL SUCCINATE 25 MG: 25 TABLET, EXTENDED RELEASE ORAL at 08:05

## 2017-05-16 RX ADMIN — FAMOTIDINE 20 MG: 20 TABLET ORAL at 08:05

## 2017-05-16 RX ADMIN — LOSARTAN POTASSIUM 50 MG: 50 TABLET, FILM COATED ORAL at 08:05

## 2017-05-16 RX ADMIN — ACETAMINOPHEN 650 MG: 325 TABLET ORAL at 08:05

## 2017-05-16 NOTE — DISCHARGE SUMMARY
[facility] Hospital Medicine  Discharge Summary      Patient Name: Shirley Metz  MRN: [id number]  Admission Date: 5/13/2017  Hospital Length of Stay: 3 days  Discharge Date and Time:  05/16/2017 1:28 PM  Attending Physician: Ric Riley MD   Discharging Provider: FABIO Rbolero  Primary Care Provider: Yandy Terrell MD      HPI:   Ms. Bettencourt is a 85 y/o  female with h/o HTN presents to the ED c/o worsening SOB and uncontrolled hypertension for the past 2-3 weeks associated with dry mouth, increasing fatigue since starting coreg. Patient reports intolerance to multiple anti-hypertensive medications. In the ED, her BP was elevated at 201/83, repeat 175/82, however troponin initially elevated at 0.029, increased to 0.043. EKG unremarkable. Patient denies chest pain.     * No surgery found *      Indwelling Lines/Drains at time of discharge:   Lines/Drains/Airways          No matching active lines, drains, or airways        Hospital Course:   Shirley Bettencourt is an 84 year old female admitted for hypertensive urgency. Cardiology consulted. Patient treated with HCTZ, Losartan, Metoprolol, and PRN antihypertensives. EKG unremarkable. 2D ECHO with EF 60-65% with DD and pulmonary HTN. Troponin trended up. NM stress test negative on 05/15/17.  BP improved. Pt to follow up with PCP within 3 days for hospital follow up. Pt will also follow with Chad Stanford NP (cardiology) one week after discharge for hospital follow up. Pt seen and examined on the date of discharge and determined suitable for discharge.      Consults:   Consults         Status Ordering Provider     Inpatient consult to Cardiology  Once     Provider:  Camilo Ya MD    Completed STEVAN ZAVALA          Significant Diagnostic Studies: Labs:   BMP:   Recent Labs  Lab 05/15/17  0521 05/16/17  0518    95    141   K 3.7 3.6    105   CO2 26 26   BUN 24* 27*   CREATININE 1.3 1.3   CALCIUM 9.3 9.8     and CBC   Recent Labs  Lab 05/15/17  0521 05/16/17  0518   WBC 8.17 8.66   HGB 11.6* 12.5   HCT 33.8* 37.3    227     Radiology:   Imaging Results         NM Myocardial Perfusion Spect Multi Pharmacologic (In process)         X-Ray Chest PA And Lateral (Final result) Result time:  05/13/17 17:12:44    Final result by Emmanuel Quinonez MD (05/13/17 17:12:44)    Impression:      Cardiomegaly.  No acute findings.      Electronically signed by: EMMANUEL QUINONEZ MD  Date:     05/13/17  Time:    17:12     Narrative:    Exam: XR CHEST PA AND LATERAL,    Date:  05/13/17 17:03:50    History: Shortness of breath.    Comparison:  01/09/2017.    Findings: Cardiomegaly is evident.  The lung fields appear clear with no obvious failure.              Pending Diagnostic Studies:     Procedure Component Value Units Date/Time    Lipid panel [[id number]] Collected:  05/14/17 0550    Order Status:  Sent Lab Status:  In process Updated:  05/14/17 0550    Specimen:  Blood from Blood     NM Myocardial Perfusion Spect Multi Pharmacologic [[id number]] Resulted:  05/15/17 0906    Order Status:  Sent Lab Status:  In process Updated:  05/15/17 1400        Final Active Diagnoses:    Diagnosis Date Noted POA    PRINCIPAL PROBLEM:  Hypertensive urgency [I16.0] 05/13/2017 Yes    Elevated troponin I level [R74.8] 05/13/2017 Yes      Problems Resolved During this Admission:    Diagnosis Date Noted Date Resolved POA        Discharged Condition: stable    Disposition: Home or Self Care    Follow Up:  Follow-up Information     Follow up with Yandy Terrell MD. Schedule an appointment as soon as possible for a visit in 3 days.    Specialty:  Family Medicine    Why:  hospital follow up    Contact information:    [facility] [id number] [phone]          Follow up with Milton Stanford NP. Schedule an appointment as soon as possible for a visit in 1 week.    Specialty:  Cardiology    Why:  hospital follow up    Contact  information:    [address]  [place] [address]  [phone]          Patient Instructions:     Diet general   Order Specific Question Answer Comments   Additional restrictions: Cardiac (Low Na/Chol)      Activity as tolerated     Call MD for:  increased confusion or weakness     Call MD for:  persistent dizziness, light-headedness, or visual disturbances     Call MD for:  difficulty breathing or increased cough       Medications:  Reconciled Home Medications:   Current Discharge Medication List      START taking these medications    Details   alprazolam (XANAX) 0.5 MG tablet Take 1 tablet (0.5 mg total) by mouth 2 (two) times daily as needed for Anxiety or Insomnia.  Qty: 4 tablet, Refills: 0      losartan (COZAAR) 50 MG tablet Take 1 tablet (50 mg total) by mouth 2 (two) times daily.  Qty: 60 tablet, Refills: 1         CONTINUE these medications which have NOT CHANGED    Details   acetaminophen (TYLENOL) 500 MG tablet Take 500 mg by mouth once daily at 6am.      cloNIDine (CATAPRES) 0.1 MG tablet Take 1 tablet (0.1 mg total) by mouth 2 (two) times daily as needed (BP greater than 160/90).  Qty: 60 tablet, Refills: 11      loteprednol (LOTEMAX) 0.5 % ophthalmic suspension 1 drop once daily. One drop in the left eye and 1 drops in the right eye      metoprolol succinate (TOPROL-XL) 25 MG 24 hr tablet Take 1 tablet (25 mg total) by mouth 2 (two) times daily.  Qty: 60 tablet, Refills: 6    Comments: Dose adjustment, please d/c previous orders for metoprolol  Associated Diagnoses: Palpitations      POLYETHYLENE GLYCOL 3350 (MIRALAX ORAL) Take by mouth.      hydrochlorothiazide (HYDRODIURIL) 25 MG tablet Take 1 tablet (25 mg total) by mouth 2 (two) times daily.  Qty: 60 tablet, Refills: 2    Associated Diagnoses: Essential hypertension         STOP taking these medications       carvedilol (COREG) 25 MG tablet Comments:   Reason for Stopping:         diphenhydramine-acetaminophen (TYLENOL PM)  mg Tab  Comments:   Reason for Stopping:             Time spent on the discharge of patient: 35 minutes    FABIO Roblero  Department of Hospital Medicine  [facility]

## 2017-05-16 NOTE — PROGRESS NOTES
Pt blood pressure 178/78 manually taken, no distress noted, a.m medication given, hydralazine PRN medication given, bp decreased to 150/78 manually taken approx one hour after administering a.m medication and PRN medication.

## 2017-05-16 NOTE — PLAN OF CARE
CM followed up with pt for d/c planning - Pt states that she will be returning to [facility] assisted [facility], where she is satisfied with level of care.  CM discussed home health with pt but pt denied need for home health services.  Pt expected to discharge with no CM needs.      D/C Plan:  Return to [facility]       05/16/17 1345   Final Note   Assessment Type Final Discharge Note   Discharge Disposition Home   Discharge planning education complete? Yes   Hospital Follow Up  Appt(s) scheduled? Yes   Discharge plans and expectations educations in teach back method with documentation complete? Yes   Discharge/Hospital Encounter Summary to ([facility]) PCP n/a   Referral to Outpatient Case Management complete? n/a   Referral to / orders for Home Health Complete? n/a   30 day supply of medicines given at discharge, if documented non-compliance / non-adherence? n/a   Any social issues identified prior to discharge? No   Did you assess the readiness or willingness of the family or caregiver to support self management of care? Yes   Right Care Referral Info   Post Acute Recommendation Other   Referral Type Assisted living    Facility Name [facility]    Street [address] [place] [address]

## 2017-05-16 NOTE — PLAN OF CARE
Problem: Patient Care Overview  Goal: Plan of Care Review  Outcome: Ongoing (interventions implemented as appropriate)  Plan of care reviewed with patient. AAO x3. V/S stable. Patient on telemetry NS rhythm noted. Patient ambulating independent, fall precautions in place, patient free from fall/injury. Patient has no questions at this time. Will continue to monitor.

## 2017-05-16 NOTE — PROGRESS NOTES
Pt discharged via w/c, no distress noted, discharge planning discussed with pt, paper rx given to pt, IV access removed, heart monitor removed by pt.

## 2017-05-18 ENCOUNTER — PATIENT OUTREACH (OUTPATIENT)
Dept: ADMINISTRATIVE | Facility: CLINIC | Age: 82
End: 2017-05-18
Payer: MEDICARE

## 2017-05-18 ENCOUNTER — TELEPHONE (OUTPATIENT)
Dept: CARDIOLOGY | Facility: CLINIC | Age: 82
End: 2017-05-18

## 2017-05-18 NOTE — TELEPHONE ENCOUNTER
Pt calling states she is experiencing same symptoms she had when she was previously in hospital states symptoms are worse now sob and pain in shoulder and leg are worse. Pt states BP yesterday morning was 215/86 before meds, at noon 172/74 after meds, last night 8:00pm- 181/74 and 10:00pm 190/68. Pt states yesterday she took 4 clonidine within 24 hours. Pt states BP this morning before meds was 170/69. I advised pt to go to ER since she feels her symptoms have got worse and her BP is still elevated pt states she isn't going to ER states she wants to speak with Arvind before going. I let pt know Arvind is in clinic and i will send message to her. Pt asked to speak with Chad I let pt know Chad is currently working in hospital. Pt verbalized understanding.

## 2017-05-18 NOTE — PATIENT INSTRUCTIONS
Discharge Instructions: Taking Your Blood Pressure  Blood pressure is the force of blood as it moves from the heart through the blood vessels. You can take your own blood pressure reading using a digital monitor. Take readings as often as your healthcare provider instructs. Take your readings each time in the same way, using the same arm. Here are guidelines for taking your blood pressure.  The American Heart Association (AHA) recommends purchasing a blood pressure monitor that is validated and approved by the Association for the Advancement of Medical Instrumentation, the [place] Hypertension Society, and the International Protocol for the Validation of Automated BP Measuring Devices. If the blood pressure monitor is for a senior adult, a pregnant woman, or a child, make certain it is validated for use with such a population. For the most reliable readings, the AHA recommends an automatic, cuff-style, upper arm (bicep) monitor. The readings from finger and wrist monitors are not as reliable as the upper arm monitor.        Step 1. Relax    · Wait at least a half hour after smoking, eating, or exercising. Do not drink coffee, tea, soda, or other caffeinated beverages before checking your blood pressure.   · Sit comfortably at a table. Place the monitor near you.  · Rest for a few minutes before you begin.        Step 2. Wrap the cuff    · Place your arm on the table, palm up. Put your arm in a position that is level with your heart. Wrap the cuff around your upper arm, about an inch above your elbow. Its best to wrap the cuff on bare skin, not over clothing.  · Make sure your cuff fits. If it doesnt wrap around your upper arm, order a larger cuff. A cuff that is too large or too small can result in an inaccurate blood pressure reading.           Step 3. Inflate the cuff    · Pump the cuff until the scale reads 200. If you have a self-inflating cuff, push the button that starts the pump.  · The cuff will  tighten, then loosen.  · The numbers will change. When they stop changing, your blood pressure reading will appear.  · If you get a reading that is too high or too low for you, relax for a few minutes. Then do the test again.    Step 4. Write down the results  · Write down your blood pressure numbers. Paul the date and time. Keep your results in one place, such as a notebook.  · Remove the cuff from your arm. Turn off the machine.  · Take the readings with you to your medical appointments.  · If you start a new blood pressure medicine, or change a blood pressure medicine dose, note the day you started the new drug or dosage on your blood pressure recording sheet. This will help your healthcare provider monitor the effect of medication changes.     Date Last Reviewed: 4/27/2016  © 4029-4710 [employer]. [address] [place] [address], [place] [address]. All rights reserved. This information is not intended as a substitute for professional medical care. Always follow your healthcare professional's instructions.

## 2017-05-18 NOTE — TELEPHONE ENCOUNTER
I have contacted patient regarding her concerns about her BP running high and symptoms that she has been having.  She was discharged from the hospital last week and had negative stress test. She would like to try taking Clonidine 0.2mg in the evening scheduled and follow up in clinic next Tuesday as scheduled

## 2017-05-22 ENCOUNTER — OFFICE VISIT (OUTPATIENT)
Dept: INTERNAL MEDICINE | Facility: CLINIC | Age: 82
End: 2017-05-22
Payer: MEDICARE

## 2017-05-22 VITALS
SYSTOLIC BLOOD PRESSURE: 129 MMHG | BODY MASS INDEX: 30.46 KG/M2 | WEIGHT: 177.5 LBS | DIASTOLIC BLOOD PRESSURE: 55 MMHG | TEMPERATURE: 98 F | HEART RATE: 64 BPM | OXYGEN SATURATION: 98 %

## 2017-05-22 DIAGNOSIS — I10 ESSENTIAL HYPERTENSION: ICD-10-CM

## 2017-05-22 DIAGNOSIS — F41.9 ANXIETY: ICD-10-CM

## 2017-05-22 DIAGNOSIS — T50.905D MEDICATION SIDE EFFECTS, SUBSEQUENT ENCOUNTER: ICD-10-CM

## 2017-05-22 DIAGNOSIS — Z09 HOSPITAL DISCHARGE FOLLOW-UP: Primary | ICD-10-CM

## 2017-05-22 DIAGNOSIS — G47.00 INSOMNIA, UNSPECIFIED TYPE: ICD-10-CM

## 2017-05-22 PROCEDURE — 99214 OFFICE O/P EST MOD 30 MIN: CPT | Mod: S$GLB,,, | Performed by: FAMILY MEDICINE

## 2017-05-22 PROCEDURE — 3074F SYST BP LT 130 MM HG: CPT | Mod: S$GLB,,, | Performed by: FAMILY MEDICINE

## 2017-05-22 PROCEDURE — 1126F AMNT PAIN NOTED NONE PRSNT: CPT | Mod: S$GLB,,, | Performed by: FAMILY MEDICINE

## 2017-05-22 PROCEDURE — 1159F MED LIST DOCD IN RCRD: CPT | Mod: S$GLB,,, | Performed by: FAMILY MEDICINE

## 2017-05-22 PROCEDURE — 99499 UNLISTED E&M SERVICE: CPT | Mod: S$GLB,,, | Performed by: FAMILY MEDICINE

## 2017-05-22 PROCEDURE — 1160F RVW MEDS BY RX/DR IN RCRD: CPT | Mod: S$GLB,,, | Performed by: FAMILY MEDICINE

## 2017-05-22 PROCEDURE — 3078F DIAST BP <80 MM HG: CPT | Mod: S$GLB,,, | Performed by: FAMILY MEDICINE

## 2017-05-22 PROCEDURE — 99999 PR PBB SHADOW E&M-EST. PATIENT-LVL III: CPT | Mod: PBBFAC,,, | Performed by: FAMILY MEDICINE

## 2017-05-22 RX ORDER — ALPRAZOLAM 0.5 MG/1
0.5 TABLET ORAL NIGHTLY PRN
Qty: 20 TABLET | Refills: 0 | Status: SHIPPED | OUTPATIENT
Start: 2017-05-22 | End: 2017-06-19 | Stop reason: SDUPTHER

## 2017-05-22 NOTE — PROGRESS NOTES
Transitional Care Note  Subjective:       Patient ID: Shirley Metz is a 84 y.o. female.  Chief Complaint: Hospital Follow Up and hypertensive urgency    Family and/or Caretaker present at visit?  No.  Diagnostic tests reviewed/disposition: I have reviewed all completed as well as pending diagnostic tests at the time of discharge.  Disease/illness education: reviewed  Home health/community services discussion/referrals: Patient does not have home health established from hospital visit.  They do not need home health.  If needed, we will set up home health for the patient.   Establishment or re-establishment of referral orders for community resources: No other necessary community resources.   Discussion with other health care providers: No discussion with other health care providers necessary.   HPI  Review of Systems    Objective:      Physical Exam    Assessment:       1. Hospital discharge follow-up    2. Essential hypertension    3. Medication side effects, subsequent encounter    4. Insomnia, unspecified type    5. Anxiety        Plan:       ***

## 2017-05-22 NOTE — PROGRESS NOTES
Subjective:       Patient ID: Shirley Metz is a 84 y.o. female.    Chief Complaint: Hospital Follow Up and hypertensive urgency    She is here for hospital DC F/U - complex course with uncontrolled BP - appears much improved today and she has been stable - taking metoprolol 25 BID, HCTZ 25 BID, clonidine 0.1 mg 2 at night, losartan 50 BID. She is having improved BPs since her cards had her take the clonidine (2) nightly, starting 5/18/17. She has c/o problems with crawling sensation to her arms and sleeplessness. She thinks these are due to the clonidine. She took a xanax last night and did well - slept 5 hours before having to get up to urinate.    She has long-term problem with anxiety but thinks it is worse. She has trouble with sleep onset. She was on zoloft and lexapro in past and felt it did not work.  States dry mouth sensation is better.  TRANSITION OF CARE:  Family and/or Caretaker present at visit?  No.  Diagnostic tests reviewed/disposition: I have reviewed all completed as well as pending diagnostic tests at the time of discharge.  Disease/illness education: reviewed  Home health/community services discussion/referrals: Patient does not have home health established from hospital visit.  They do not need home health.  If needed, we will set up home health for the patient.   Establishment or re-establishment of referral orders for community resources: No other necessary community resources.   Discussion with other health care providers: No discussion with other health care providers necessary.       Hypertension   This is a recurrent problem. The current episode started more than 1 year ago. The problem has been waxing and waning since onset. The problem is resistant. Associated symptoms include anxiety, malaise/fatigue and shortness of breath (states improved but increases with exertion - more so in last few weeks). Pertinent negatives include no blurred vision, chest pain, headaches, neck  pain, orthopnea, palpitations, peripheral edema, PND or sweats. There are no associated agents to hypertension. Risk factors for coronary artery disease include dyslipidemia, obesity and stress. The current treatment provides moderate improvement. Compliance problems include medication side effects.      Review of Systems   Constitutional: Positive for malaise/fatigue.   Eyes: Negative for blurred vision.   Respiratory: Positive for shortness of breath (states improved but increases with exertion - more so in last few weeks).    Cardiovascular: Negative for chest pain, palpitations, orthopnea and PND.   Musculoskeletal: Negative for neck pain.   Neurological: Negative for headaches.   Psychiatric/Behavioral: Positive for sleep disturbance.       Objective:      Physical Exam   Constitutional: She is oriented to person, place, and time. She appears well-developed.   HENT:   Head: Normocephalic and atraumatic.   Cardiovascular: Normal rate, regular rhythm and normal heart sounds.    Pulmonary/Chest: Effort normal and breath sounds normal.   Abdominal: Soft. She exhibits no distension. There is no tenderness.   Neurological: She is alert and oriented to person, place, and time.   Skin: Skin is warm and dry.   Psychiatric: She has a normal mood and affect. Her behavior is normal.         Assessment/Plan:     1. Hospital discharge follow-up     2. Essential hypertension     3. Medication side effects, subsequent encounter     4. Insomnia, unspecified type  Ambulatory referral to Psychiatry   5. Anxiety  Ambulatory referral to Psychiatry   More than 50% of visit was spent in counseling regarding options, recommendations, medication use, side effects, expectations, and precautions.  Total time spent face-to-face was 25 minutes.    Xanax 0.5 RF sent for prn use at night. Reviewed that this is not to be used nightly - reviewed possible use of another med in future. She had trouble with SSRIs in past  Consider CBT for  insomnia - referral to see Mr iSngleton through psychiatry given.   Continue with current dosing of BP meds - sees cards tomorrow. No changes for now. She has several c/o, but I do not think any are specific to her medication at this time.  Recheck 4 weeks to assess BP stability, insomnia, xanax use.

## 2017-05-23 ENCOUNTER — OFFICE VISIT (OUTPATIENT)
Dept: CARDIOLOGY | Facility: CLINIC | Age: 82
End: 2017-05-23
Payer: MEDICARE

## 2017-05-23 VITALS
HEIGHT: 64 IN | DIASTOLIC BLOOD PRESSURE: 60 MMHG | WEIGHT: 176.81 LBS | HEART RATE: 66 BPM | SYSTOLIC BLOOD PRESSURE: 150 MMHG | BODY MASS INDEX: 30.19 KG/M2

## 2017-05-23 DIAGNOSIS — N18.30 CHRONIC KIDNEY DISEASE, STAGE 3: ICD-10-CM

## 2017-05-23 DIAGNOSIS — I10 ESSENTIAL HYPERTENSION: Primary | Chronic | ICD-10-CM

## 2017-05-23 DIAGNOSIS — E78.5 HYPERLIPIDEMIA, UNSPECIFIED HYPERLIPIDEMIA TYPE: Chronic | ICD-10-CM

## 2017-05-23 DIAGNOSIS — I11.9 LVH (LEFT VENTRICULAR HYPERTROPHY) DUE TO HYPERTENSIVE DISEASE, WITHOUT HEART FAILURE: Chronic | ICD-10-CM

## 2017-05-23 DIAGNOSIS — R00.2 PALPITATIONS: ICD-10-CM

## 2017-05-23 PROCEDURE — 1126F AMNT PAIN NOTED NONE PRSNT: CPT | Mod: S$GLB,,, | Performed by: NURSE PRACTITIONER

## 2017-05-23 PROCEDURE — 99999 PR PBB SHADOW E&M-EST. PATIENT-LVL III: CPT | Mod: PBBFAC,,, | Performed by: NURSE PRACTITIONER

## 2017-05-23 PROCEDURE — 99214 OFFICE O/P EST MOD 30 MIN: CPT | Mod: S$GLB,,, | Performed by: NURSE PRACTITIONER

## 2017-05-23 PROCEDURE — 3077F SYST BP >= 140 MM HG: CPT | Mod: S$GLB,,, | Performed by: NURSE PRACTITIONER

## 2017-05-23 PROCEDURE — 99499 UNLISTED E&M SERVICE: CPT | Mod: S$GLB,,, | Performed by: NURSE PRACTITIONER

## 2017-05-23 PROCEDURE — 1159F MED LIST DOCD IN RCRD: CPT | Mod: S$GLB,,, | Performed by: NURSE PRACTITIONER

## 2017-05-23 PROCEDURE — 3078F DIAST BP <80 MM HG: CPT | Mod: S$GLB,,, | Performed by: NURSE PRACTITIONER

## 2017-05-23 RX ORDER — METOPROLOL SUCCINATE 25 MG/1
25 TABLET, EXTENDED RELEASE ORAL 2 TIMES DAILY
Qty: 180 TABLET | Refills: 3 | Status: SHIPPED | OUTPATIENT
Start: 2017-05-23 | End: 2018-03-14 | Stop reason: SDUPTHER

## 2017-05-23 RX ORDER — LOSARTAN POTASSIUM 50 MG/1
50 TABLET ORAL 2 TIMES DAILY
Qty: 180 TABLET | Refills: 3 | Status: SHIPPED | OUTPATIENT
Start: 2017-05-23 | End: 2018-03-17

## 2017-05-23 NOTE — PROGRESS NOTES
Subjective:   Patient ID:  Shirley Metz is a 84 y.o. female who presents for follow up of Hypertension      HPI  Patient presents to clinic for hospital follow up. She presented to the ED with HTN urgency. She was also noted to have mild troponin leak that was thought to be related to HTN and elevated BNP.  Patient has long standing history of HTN and has been very sensitive to meds in the past. Has had side effects and allergic reaction to multiple meds that have been prescribed to control her HTN. She had 2D ECHO with EF 60-65% with DD and pulmonary HTN.  She was started on Coreg after hospital admission, but felt that she was having a reaction to it, had lip swelling. She had nuclear stress test during admission that was negative The last change that was made to her medications is she was instructed to take 2 clonidine in the evening and monitor BP response. She presents today with no complaints. No chest pain, no edema, orthopnea, PND, dizziness, near syncope. Has chronic SOB.  She states that she feels more so back to her baseline. BP is up a little this morning, but didn't take her meds today. She has no CNS complaints to suggest TIA or CVA.  Has high suspicion for ROBINSON, but was unable to complete study due to her not being able to fall asleep       Past Medical History:   Diagnosis Date    Arthritis     Back pain     Fuchs' corneal dystrophy     Glaucoma     Hyperlipidemia     Hypertension     Macular degeneration dry    Obesity     Trouble in sleeping     Urinary tract infection        Past Surgical History:   Procedure Laterality Date    ADENOIDECTOMY  1938    BREAST CYST EXCISION Left 1997 approx    CARPAL TUNNEL RELEASE Right 2012 approx    CATARACT EXTRACTION Bilateral 1994    CHOLECYSTECTOMY  1975    CORNEAL TRANSPLANT Bilateral 08/2015 8/2015 - left; Right 4/2016    EYE SURGERY      Fuch's Corneal dystrophy - had 2nd operation with Dr. Quintanilla    EYE SURGERY      Cataract  wIOL    left thumb Left 2011    removed cuboid for arthritis    SLT- OS (aka TRABECULOPLASTY) Left 05/11/2011    repeat 3/14/12    TONSILLECTOMY  1938       Social History   Substance Use Topics    Smoking status: Never Smoker    Smokeless tobacco: Never Used      Comment: history of passive smoking from her ex-    Alcohol use 1.2 oz/week     2 Standard drinks or equivalent per week      Comment: 2 scotch drinks a week.        Family History   Problem Relation Age of Onset    Heart disease Mother     Hypertension Mother     Cancer Father 88     sarcoma( ?[place]osi's ) on leg    Deep vein thrombosis Neg Hx     Ovarian cancer Neg Hx     Breast cancer Neg Hx     Kidney disease Neg Hx     Strabismus Neg Hx     Retinal detachment Neg Hx     Macular degeneration Neg Hx     Glaucoma Neg Hx     Blindness Neg Hx     Amblyopia Neg Hx     Eczema Neg Hx     Lupus Neg Hx     Melanoma Neg Hx     Psoriasis Neg Hx     Diabetes Neg Hx     Stroke Neg Hx     Mental retardation Neg Hx     Mental illness Neg Hx     Hyperlipidemia Neg Hx     COPD Neg Hx     Asthma Neg Hx     Depression Neg Hx     Alcohol abuse Neg Hx     Drug abuse Neg Hx        Current Outpatient Prescriptions   Medication Sig    acetaminophen (TYLENOL) 500 MG tablet Take 500 mg by mouth once daily at 6am.    alprazolam (XANAX) 0.5 MG tablet Take 1 tablet (0.5 mg total) by mouth nightly as needed for Insomnia or Anxiety.    cloNIDine (CATAPRES) 0.1 MG tablet Take 1 tablet (0.1 mg total) by mouth 2 (two) times daily as needed (BP greater than 160/90). (Patient taking differently: Take 0.1 mg by mouth 2 (two) times daily as needed (BP greater than 160/90  takes one at 7 pm and one at 11 pm). )    hydrochlorothiazide (HYDRODIURIL) 25 MG tablet Take 1 tablet (25 mg total) by mouth 2 (two) times daily.    losartan (COZAAR) 50 MG tablet Take 1 tablet (50 mg total) by mouth 2 (two) times daily.    loteprednol (LOTEMAX) 0.5 %  ophthalmic suspension 1 drop once daily. One drop in the left eye and 1 drops in the right eye    metoprolol succinate (TOPROL-XL) 25 MG 24 hr tablet Take 1 tablet (25 mg total) by mouth 2 (two) times daily.    POLYETHYLENE GLYCOL 3350 (MIRALAX ORAL) Take by mouth.     No current facility-administered medications for this visit.      Current Outpatient Prescriptions on File Prior to Visit   Medication Sig    acetaminophen (TYLENOL) 500 MG tablet Take 500 mg by mouth once daily at 6am.    alprazolam (XANAX) 0.5 MG tablet Take 1 tablet (0.5 mg total) by mouth nightly as needed for Insomnia or Anxiety.    cloNIDine (CATAPRES) 0.1 MG tablet Take 1 tablet (0.1 mg total) by mouth 2 (two) times daily as needed (BP greater than 160/90). (Patient taking differently: Take 0.1 mg by mouth 2 (two) times daily as needed (BP greater than 160/90  takes one at 7 pm and one at 11 pm). )    hydrochlorothiazide (HYDRODIURIL) 25 MG tablet Take 1 tablet (25 mg total) by mouth 2 (two) times daily.    losartan (COZAAR) 50 MG tablet Take 1 tablet (50 mg total) by mouth 2 (two) times daily.    loteprednol (LOTEMAX) 0.5 % ophthalmic suspension 1 drop once daily. One drop in the left eye and 1 drops in the right eye    metoprolol succinate (TOPROL-XL) 25 MG 24 hr tablet Take 1 tablet (25 mg total) by mouth 2 (two) times daily.    POLYETHYLENE GLYCOL 3350 (MIRALAX ORAL) Take by mouth.     No current facility-administered medications on file prior to visit.        Review of Systems   Constitution: Negative for decreased appetite, weakness, malaise/fatigue, weight gain and weight loss.   HENT: Negative for nosebleeds.         Nasal congestion      Cardiovascular: Negative for chest pain, claudication, dyspnea on exertion, irregular heartbeat, leg swelling, near-syncope, orthopnea, palpitations, paroxysmal nocturnal dyspnea and syncope.   Respiratory: Negative for cough, shortness of breath, sleep disturbances due to breathing, snoring  "and wheezing.    Hematologic/Lymphatic: Negative for bleeding problem. Does not bruise/bleed easily.   Skin: Negative for rash.   Musculoskeletal: Positive for arthritis and myalgias. Negative for back pain, falls, joint pain, joint swelling, muscle cramps and muscle weakness.   Gastrointestinal: Negative for bloating, abdominal pain, constipation, diarrhea, heartburn, nausea and vomiting.   Genitourinary: Negative for dysuria, hematuria and nocturia.   Neurological: Negative for excessive daytime sleepiness, dizziness, light-headedness, loss of balance, numbness, paresthesias and vertigo.   Psychiatric/Behavioral: The patient has insomnia.        Objective:   Physical Exam   Constitutional: She is oriented to person, place, and time. She appears well-developed and well-nourished.   Neck: Neck supple. No JVD present.   Cardiovascular: Normal rate, regular rhythm, normal heart sounds and normal pulses.  Exam reveals no friction rub.    No murmur heard.  Pulmonary/Chest: Effort normal and breath sounds normal. No respiratory distress. She has no wheezes. She has no rales.   Abdominal: Soft. Bowel sounds are normal. She exhibits no distension.   Musculoskeletal: She exhibits no edema or tenderness.   Neurological: She is alert and oriented to person, place, and time.   Skin: Skin is warm and dry. No rash noted.   Psychiatric: She has a normal mood and affect. Her behavior is normal.   Nursing note and vitals reviewed.    Vitals:    05/23/17 0829   BP: (!) 150/60   BP Location: Right arm   Patient Position: Sitting   BP Method: Manual   Pulse: 66   Weight: 80.2 kg (176 lb 12.9 oz)   Height: 5' 4" (1.626 m)     Lab Results   Component Value Date    CHOL 258 (H) 01/04/2017    CHOL 252 (H) 06/13/2016    CHOL 255 (H) 11/02/2015     Lab Results   Component Value Date    HDL 44 01/04/2017    HDL 42 06/13/2016    HDL 47 11/02/2015     Lab Results   Component Value Date    LDLCALC 177.2 (H) 01/04/2017    LDLCALC 152.4 " 06/13/2016    LDLCALC 160.0 (H) 11/02/2015     Lab Results   Component Value Date    TRIG 184 (H) 01/04/2017    TRIG 288 (H) 06/13/2016    TRIG 240 (H) 11/02/2015     Lab Results   Component Value Date    CHOLHDL 17.1 (L) 01/04/2017    CHOLHDL 16.7 (L) 06/13/2016    CHOLHDL 18.4 (L) 11/02/2015       Chemistry        Component Value Date/Time     05/16/2017 0518    K 3.6 05/16/2017 0518     05/16/2017 0518    CO2 26 05/16/2017 0518    BUN 27 (H) 05/16/2017 0518    CREATININE 1.3 05/16/2017 0518    GLU 95 05/16/2017 0518        Component Value Date/Time    CALCIUM 9.8 05/16/2017 0518    ALKPHOS 63 05/16/2017 0518    AST 15 05/16/2017 0518    ALT 18 05/16/2017 0518    BILITOT 1.0 05/16/2017 0518          Lab Results   Component Value Date    TSH 1.871 01/04/2017     Lab Results   Component Value Date    INR 1.0 01/01/2017    INR 1.0 02/09/2016    INR 1.0 01/22/2015     Lab Results   Component Value Date    WBC 8.66 05/16/2017    HGB 12.5 05/16/2017    HCT 37.3 05/16/2017    MCV 96 05/16/2017     05/16/2017     BMP  Sodium   Date Value Ref Range Status   05/16/2017 141 136 - 145 mmol/L Final     Potassium   Date Value Ref Range Status   05/16/2017 3.6 3.5 - 5.1 mmol/L Final     Chloride   Date Value Ref Range Status   05/16/2017 105 95 - 110 mmol/L Final     CO2   Date Value Ref Range Status   05/16/2017 26 23 - 29 mmol/L Final     BUN, Bld   Date Value Ref Range Status   05/16/2017 27 (H) 8 - 23 mg/dL Final     Creatinine   Date Value Ref Range Status   05/16/2017 1.3 0.5 - 1.4 mg/dL Final     Calcium   Date Value Ref Range Status   05/16/2017 9.8 8.7 - 10.5 mg/dL Final     Anion Gap   Date Value Ref Range Status   05/16/2017 10 8 - 16 mmol/L Final     eGFR if    Date Value Ref Range Status   05/16/2017 44 (A) >60 mL/min/1.73 m^2 Final     eGFR if non    Date Value Ref Range Status   05/16/2017 38 (A) >60 mL/min/1.73 m^2 Final     Comment:     Calculation used to  obtain the estimated glomerular filtration  rate (eGFR) is the CKD-EPI equation. Since race is unknown   in our information system, the eGFR values for   -American and Non--American patients are given   for each creatinine result.       Estimated Creatinine Clearance: 33 mL/min (based on Cr of 1.3).    Assessment:     1. Essential hypertension    2. LVH (left ventricular hypertrophy) due to hypertensive disease, without heart failure    3. Chronic kidney disease, stage 3    4. Hyperlipidemia, unspecified hyperlipidemia type    BP well controlled on current regimen. Her BP is a little elevated this morning, but she hasn't taken her medication this morning  She has multiple drug allergies and has been very sensitive to meds. She is tolerating current regimen  No CNS complaints to suggest TIA or CVA  No evidence of CHF on exam  Negative stress test   Has high suspicion for ROBINSON, but was unable to complete study due to her not being able to fall asleep     Plan:   Continue current medical management   Heart healthy diet  50-60 oz fluid restriction  RTC in 6 months or sooner if needed

## 2017-05-24 ENCOUNTER — PATIENT MESSAGE (OUTPATIENT)
Dept: INTERNAL MEDICINE | Facility: CLINIC | Age: 82
End: 2017-05-24

## 2017-05-24 ENCOUNTER — TELEPHONE (OUTPATIENT)
Dept: INTERNAL MEDICINE | Facility: CLINIC | Age: 82
End: 2017-05-24

## 2017-05-24 NOTE — TELEPHONE ENCOUNTER
Please see the psychiatry referral including my question about whether CBT is available from Mr. Arvizu for help with sleep.  Please contact ps staff to consult with Mr. Arvizu about whether he is able to provide this kind of therapy for patient.

## 2017-05-24 NOTE — TELEPHONE ENCOUNTER
Please see the psychiatry referral including my question about whether CBT is available from Mr. Arvizu for help with sleep.  Please contact [facility] staff to consult with Mr. Arvizu about whether he is able to provide this kind of therapy for patient          Please advise if you are able to provide this kind of service

## 2017-05-24 NOTE — TELEPHONE ENCOUNTER
I do CBT for depression and anxiety.  Insomnia can be a result of anxiety and depression.  However, the patient may benefit from a sleep clinic if anxiety and depression are not part of their problem.  Let me know if you have any other questions.

## 2017-06-19 ENCOUNTER — OFFICE VISIT (OUTPATIENT)
Dept: INTERNAL MEDICINE | Facility: CLINIC | Age: 82
End: 2017-06-19
Payer: MEDICARE

## 2017-06-19 VITALS
TEMPERATURE: 98 F | BODY MASS INDEX: 30.22 KG/M2 | WEIGHT: 176.06 LBS | HEART RATE: 86 BPM | SYSTOLIC BLOOD PRESSURE: 100 MMHG | DIASTOLIC BLOOD PRESSURE: 57 MMHG | OXYGEN SATURATION: 96 %

## 2017-06-19 DIAGNOSIS — I10 ESSENTIAL HYPERTENSION: Primary | ICD-10-CM

## 2017-06-19 DIAGNOSIS — N18.30 CKD (CHRONIC KIDNEY DISEASE) STAGE 3, GFR 30-59 ML/MIN: ICD-10-CM

## 2017-06-19 DIAGNOSIS — F41.9 ANXIETY: ICD-10-CM

## 2017-06-19 DIAGNOSIS — G47.00 INSOMNIA, UNSPECIFIED TYPE: ICD-10-CM

## 2017-06-19 PROCEDURE — 1159F MED LIST DOCD IN RCRD: CPT | Mod: S$GLB,,, | Performed by: FAMILY MEDICINE

## 2017-06-19 PROCEDURE — 99214 OFFICE O/P EST MOD 30 MIN: CPT | Mod: S$GLB,,, | Performed by: FAMILY MEDICINE

## 2017-06-19 PROCEDURE — 99999 PR PBB SHADOW E&M-EST. PATIENT-LVL III: CPT | Mod: PBBFAC,,, | Performed by: FAMILY MEDICINE

## 2017-06-19 PROCEDURE — 1126F AMNT PAIN NOTED NONE PRSNT: CPT | Mod: S$GLB,,, | Performed by: FAMILY MEDICINE

## 2017-06-19 PROCEDURE — 99499 UNLISTED E&M SERVICE: CPT | Mod: S$GLB,,, | Performed by: FAMILY MEDICINE

## 2017-06-19 RX ORDER — ALPRAZOLAM 0.5 MG/1
0.5 TABLET ORAL NIGHTLY PRN
Qty: 30 TABLET | Refills: 0 | Status: SHIPPED | OUTPATIENT
Start: 2017-06-19 | End: 2017-08-16 | Stop reason: SDUPTHER

## 2017-06-19 RX ORDER — CHLORTHALIDONE 25 MG/1
25 TABLET ORAL DAILY
Qty: 90 TABLET | Refills: 1 | Status: SHIPPED | OUTPATIENT
Start: 2017-06-19 | End: 2017-11-22

## 2017-06-19 NOTE — PROGRESS NOTES
"Subjective:       Patient ID: Shirley Metz is a 84 y.o. female.    Chief Complaint: b/p follow up and medication check    She is here for BP, insomnia check. Taking Xanax at night when she has to go out next day.  She takes tylenol for pain and this is worse at night - she takes one 500mg tylenol usually first time in afternoon, then again in PM - 1 or 2 - but she is trying to limit this to no more than 3 a day.  C/o walking with more difficulty. Barely able to get to dining room using her walker. She feels weak and pain "all over" - sts definitely not her joints. She thinks her BP meds are a problem but sts this occurs in past with other kinds of meds such as eye drops and allergy meds. She sts the symptoms are the same no matter - all over pain/weakness. She sts when she stops meds she feels better.    She does state that even when on her BP meds she never feels completely OK, but some days are better than others.    She brings in her BP readings for last week - she checks in AM and one to 2 times in evening - depending on level. She takes a second clonidine if she thinks her BP is too high. She has recorded the days she has felt extremely weak in AM and they appear to coincide with morning after she took a second clonidine.  She felt very weak this morning and her BP was 145/61. She took 2 clonidine last night.      Review of Systems   Respiratory: Negative for shortness of breath.    Cardiovascular: Negative for chest pain, palpitations and leg swelling.   Musculoskeletal: Positive for gait problem.   Neurological: Positive for weakness and light-headedness (occas at rest - lsats brief moment or two).       Objective:      Physical Exam   Constitutional: She is oriented to person, place, and time. She appears well-developed.   HENT:   Head: Normocephalic and atraumatic.   Cardiovascular: Normal rate, regular rhythm and normal heart sounds.    No murmur heard.  Pulmonary/Chest: Effort normal and " breath sounds normal. No respiratory distress.   Neurological: She is alert and oriented to person, place, and time.   Skin: Skin is warm and dry.   Psychiatric: She has a normal mood and affect. Her behavior is normal.         Assessment/Plan:     1. Essential hypertension  chlorthalidone (HYGROTEN) 25 MG Tab   2. Insomnia, unspecified type  alprazolam (XANAX) 0.5 MG tablet   3. Anxiety  alprazolam (XANAX) 0.5 MG tablet   4. CKD (chronic kidney disease) stage 3, GFR 30-59 ml/min       Note low BP - pt is instructed to take no more than the one clonidine in PM. She may continue to take it couple hours before bedtime, but if she checks BP at bedtime - and she states she will although I recommend against this - and will take the xanax at bedtime if BP is high.  Switch to chlorthalidone 25 - stop BID HCTZ 25.  More than 50% of visit was spent in counseling regarding options, recommendations, medication use, side effects, expectations, and precautions.  Total time spent face-to-face was 25 minutes.

## 2017-06-19 NOTE — PATIENT INSTRUCTIONS
Stop hydrochlorothiazide. Start chlorthalidone 25 daily in AM.  Take one clonidine nightly at 8 PM. Record your BPs and your episodes of weak feelings.  You may check your BP at night if desired. Take one xanax if needed for sleep.

## 2017-07-19 ENCOUNTER — OFFICE VISIT (OUTPATIENT)
Dept: INTERNAL MEDICINE | Facility: CLINIC | Age: 82
End: 2017-07-19
Payer: MEDICARE

## 2017-07-19 VITALS
WEIGHT: 178.13 LBS | TEMPERATURE: 98 F | OXYGEN SATURATION: 97 % | HEIGHT: 64 IN | SYSTOLIC BLOOD PRESSURE: 131 MMHG | DIASTOLIC BLOOD PRESSURE: 61 MMHG | BODY MASS INDEX: 30.41 KG/M2 | HEART RATE: 63 BPM

## 2017-07-19 DIAGNOSIS — M89.9 DISORDER OF BONE: ICD-10-CM

## 2017-07-19 DIAGNOSIS — R52 PAIN, GENERALIZED: ICD-10-CM

## 2017-07-19 DIAGNOSIS — T50.905D MEDICATION SIDE EFFECTS, SUBSEQUENT ENCOUNTER: ICD-10-CM

## 2017-07-19 DIAGNOSIS — M85.80 OSTEOPENIA, UNSPECIFIED LOCATION: Chronic | ICD-10-CM

## 2017-07-19 DIAGNOSIS — I10 ESSENTIAL HYPERTENSION: ICD-10-CM

## 2017-07-19 DIAGNOSIS — R53.1 WEAKNESS GENERALIZED: Primary | ICD-10-CM

## 2017-07-19 PROCEDURE — 99215 OFFICE O/P EST HI 40 MIN: CPT | Mod: S$GLB,,, | Performed by: FAMILY MEDICINE

## 2017-07-19 PROCEDURE — 1125F AMNT PAIN NOTED PAIN PRSNT: CPT | Mod: S$GLB,,, | Performed by: FAMILY MEDICINE

## 2017-07-19 PROCEDURE — 99499 UNLISTED E&M SERVICE: CPT | Mod: S$GLB,,, | Performed by: FAMILY MEDICINE

## 2017-07-19 PROCEDURE — 99999 PR PBB SHADOW E&M-EST. PATIENT-LVL IV: CPT | Mod: PBBFAC,,, | Performed by: FAMILY MEDICINE

## 2017-07-19 PROCEDURE — 1159F MED LIST DOCD IN RCRD: CPT | Mod: S$GLB,,, | Performed by: FAMILY MEDICINE

## 2017-07-19 RX ORDER — LOSARTAN POTASSIUM 50 MG/1
50 TABLET ORAL 2 TIMES DAILY
Qty: 180 TABLET | Refills: 3 | Status: CANCELLED | OUTPATIENT
Start: 2017-07-19 | End: 2018-07-19

## 2017-07-19 RX ORDER — PYRIDOXINE HCL (VITAMIN B6) 100 MG
100 TABLET ORAL DAILY
Status: ON HOLD | COMMUNITY
End: 2019-10-11 | Stop reason: HOSPADM

## 2017-07-19 RX ORDER — ACETAMINOPHEN 500 MG
1 TABLET ORAL DAILY
COMMUNITY
End: 2024-02-02

## 2017-07-19 RX ORDER — ACETAMINOPHEN 160 MG/5ML
200 SUSPENSION, ORAL (FINAL DOSE FORM) ORAL DAILY
COMMUNITY
End: 2024-02-02

## 2017-07-19 NOTE — PATIENT INSTRUCTIONS
OK to try one HCTZ 25 one in AM instead of chlorthalidone for 2 weeks and continue to monitor BPs and symptoms.

## 2017-07-19 NOTE — PROGRESS NOTES
"Subjective:       Patient ID: Shirley Metz is a 84 y.o. female.    Chief Complaint: Hypertension and Medication Refill    Here for recheck on BP, weak spells. Still having weakness. Feels wiped out by afternoon. Using walker. Having increased pain "whole body" as well as shoulder pain. Legs feel "like they are going to fall off". States this has been going on for years and always has felt this was due to meds.  She has weakness - just can't move - has seen 2 neurologists, rheum, in past with nerve testing negative.  She is experiencing fullness and decreased appetite for couple weeks.   She has made notes of blood pressures and symptoms which are reviewed.  No specific patterns are found.  Sometimes she feels bad when the blood pressure is low sometimes not.  Oftentimes with pain her blood pressure is elevated into the 170s.  Her low-dose of beta in the 120s and there was one measurement of 110/56.      Hypertension   This is a recurrent problem. The current episode started more than 1 year ago. The problem has been waxing and waning since onset. The problem is resistant. Associated symptoms include anxiety, malaise/fatigue and shortness of breath. Pertinent negatives include no blurred vision, chest pain, headaches, neck pain, orthopnea, palpitations, peripheral edema, PND or sweats. Agents associated with hypertension include NSAIDs. Risk factors for coronary artery disease include dyslipidemia and obesity. The current treatment provides no improvement. Compliance problems include medication side effects.      Review of Systems   Constitutional: Positive for appetite change and malaise/fatigue.   Eyes: Negative for blurred vision.   Respiratory: Positive for shortness of breath.    Cardiovascular: Negative for chest pain, palpitations, orthopnea, leg swelling and PND.   Musculoskeletal: Negative for neck pain.   Neurological: Negative for headaches.       Objective:      Physical Exam   Constitutional: " She is oriented to person, place, and time. She appears well-developed.   HENT:   Head: Normocephalic and atraumatic.   Cardiovascular: Normal rate, regular rhythm and normal heart sounds.    Pulmonary/Chest: Effort normal and breath sounds normal.   Neurological: She is alert and oriented to person, place, and time.   MS5/5 throughout BUE, BLE.   Skin: Skin is warm and dry.   Psychiatric: She has a normal mood and affect. Her behavior is normal.         Assessment/Plan:     1. Essential hypertension     2. Medication side effects, subsequent encounter     3. Weakness generalized     4. Pain, generalized     5. Osteopenia, unspecified location  DXA Bone Density Spine And Hip   6. Disorder of bone   DXA Bone Density Spine And Hip   More than 50% of visit was spent in counseling regarding options, recommendations, medication use, side effects, expectations, and precautions.  Total time spent face-to-face was 55 minutes.  She continues with episodic weakness.  Her symptom diary and BP records are reviewed.  Blood pressures run within normal range as well as high.  There does not seem to be any specific correlation between BP levels and symptoms.  It is clear that she has had the same symptoms for years.  They have usually been blamed on the most recent medication.  We discussed how it would be unusual for different medications to cause the same symptoms.  Other possible etiologies and options were reviewed in depth.   Consider gabapentin. Consideration re another elimination diet. She wants to try gabapentin but also wants to try back on HCTZ instead of chlorthalidone. OK to do this for two weeks.  She will try the 2 weeks first on HCTZ - just one daily in AM - and let me know results. If no better, will start with low dose gabapentin 100 up to 3 dialy to start.  Cymbalta trial in future?  rec PT for strengthening - she defers for now.  Will revisit decreased appetite and early fullness next visit if it has not  resolved.  She declines mammogram and it is not indicated at her age - pt preference.

## 2017-07-27 ENCOUNTER — INITIAL CONSULT (OUTPATIENT)
Dept: PSYCHIATRY | Facility: CLINIC | Age: 82
End: 2017-07-27
Payer: MEDICARE

## 2017-07-27 DIAGNOSIS — F41.1 GENERALIZED ANXIETY DISORDER: Primary | ICD-10-CM

## 2017-07-27 PROCEDURE — 99499 UNLISTED E&M SERVICE: CPT | Mod: S$GLB,,, | Performed by: SOCIAL WORKER

## 2017-07-27 PROCEDURE — 90791 PSYCH DIAGNOSTIC EVALUATION: CPT | Mod: S$GLB,,, | Performed by: SOCIAL WORKER

## 2017-07-27 NOTE — PROGRESS NOTES
"Psychiatry Initial Visit (PhD/LCSW)  Diagnostic Interview - CPT 66208    Date: 7/27/2017    Site: [place]    Referral source: Yandy Terrell MD    Clinical status of patient: Outpatient    Shirley Metz, a 84 y.o. female, for initial evaluation visit.  Met with patient, "Ifrah."    Chief complaint/reason for encounter: anxiety and sleep    History of present illness: She has been having problems sleeping.  She is allergic to a lot of medication.  She has been taking Xanax which knocks her out.  She has been on it for six weeks.  She tries not to take it every night.  She will sleep around two hours.  She really does not want to take it at all.  She has always had a sleeping problem.  She would wake with any worry or anticipation.  It has gotten worse in the last three years.  She is not able to sleep during the day.  She lost her second  in 2012.  They were  for eight years.  He had multiple medical problems.  She moved into [facility] in September of 2012.  It is a senior residence.  She has an art studio in her place.  It is an active place with multiple things to do.  There are two meals a day.  They have aides if you need help.  It is a clean place.  She feels safe there.  If she takes a whole pill of Xanax, she will have "wild dreams."      Pain: 0  She had a bad pain yesterday.  The blood pressure medication will cause her pain.  She will have a general pain of weakness.      Symptoms:   · Mood: insomnia and poor concentration  · Anxiety: denied  · Substance abuse: denied  · Cognitive functioning: denied  · Health behaviors: medication allergies    Psychiatric history: She has never been in counseling before.      Medical history: She has high blood pressure.  Due to her allergies, she can't take many medications.  She is currently on a medication for blood pressure that she can't stay on for a long time.  She took statin drugs for three years.  It effected her ability to " walk.  She can walk a small distance.  She has had an eye surgery in the last two years.  She had corneal tissue transplant.  She had fuchs disease.    Family history of psychiatric illness: The patient does not know much about her family history.    Social history (marriage, employment, etc.):  Patient's mother, Kerry,  in .  She was 48.  She had rheumatic fever as a child and her heart gave out.  The patient was in college when her mother passed.  Her mother had had heart trouble for several years.  She lived in [place].  She was a homemaker.  Patient's father, Albert, is  since .  He was around 93.  He had multiple medical problems including cancer.  Her parents were  about twenty five years.  She believes it was a good marriage.  She is an only child.  She was raised in [place] the [place] and in the [place].  She had no friends and a long bus ride to school.  Her father was a  in a department store.  He commuted to [place] every day.  She graduated from [employer] in .  She went to the [employer].  She graduated in  with a bachelor's of arts.  She did not go for graduate work.  She worked her way through college.  She worked for an  in [place] for years.  She  her first , Ralph.  They were  for thirty nine years.  He  due to colon cancer.  They were sent overseas for his job in [place].  He was a  for jet engines.  He lived another ten years after the colon surgery.  They had three sons.  They are:  Rickie, 57, lives in [place].  He travels all over the world.  He is a .  He has been  three times.  He has a daughter in [place].  Maximus, 55, lives in [place], but he lives in [facility].  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  He is .  He has a three year old daughter.  Boris, 53, lives in [place].   He is  with two daughters.  He was a .  He is now doing landscaping.  His daughters are at [facility].  She has lived in [place] and [place].  They were tired of the cold weather in [place].  She moved here in 1992.  Her  passed away in 1999.  She met her second , Darrell, three years later.  They were  for eight years.  It was a good marriage.  He had one daughter.  He was three years older.  Her younger son moved into her home in [place].  His wife works for an  as a .  The patient was not raised any particular teresa.  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  She makes tiles and other pieces.  Her art may be related to her sleeping problems.  She has the best studio she has ever had.  She can't get all of the pieces she has in mind out.  She is making jewelry to sell.  She also thinks of places she would like to travel to.  She would like to go back to [facility].  There is wonderful art and unusual materials.  She would like to go to [place].  She has been in the [employer] since 1996.  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  She is involved in the maker's faire.  She has published books about jewelry.  She has been dating, Jerel, for five years.  He is blind.  He is 91.      Substance use:   Alcohol: She drinks one glass of wine or scotch per week.   Drugs: none   Tobacco: none   Caffeine: She drinks decaf coffee.  She drinks water and juices.    Current medications and drug reactions (include OTC, herbal): see medication list    Strengths and liabilities: Strength: Patient accepts guidance/feedback, Strength: Patient is expressive/articulate., Strength: Patient is intelligent., Strength: Patient is motivated for change., Strength: Patient has positive support network., Strength: Patient has reasonable judgment., Strength: Patient is stable., Liability: Patient has poor health.    Current Evaluation:      Mental Status Exam:  General Appearance:  age appropriate, well dressed, neatly groomed   Speech: normal tone, normal rate, normal pitch, normal volume      Level of Cooperation: cooperative      Thought Processes: normal and logical   Mood: anxious      Thought Content: normal, no suicidality, no homicidality, delusions, or paranoia   Affect: anxious   Orientation: Oriented x3   Memory: recent >  intact, remote >  intact   Attention Span & Concentration: intact   Fund of General Knowledge: intact and appropriate to age and level of education   Abstract Reasoning:    Judgment & Insight: fair     Language  intact     Diagnostic Impression - Plan:     Generalized Anxiety Disorder    Plan:individual psychotherapy   Medication management by physician    Return to Clinic: as scheduled    Length of Service (minutes): 45

## 2017-08-02 ENCOUNTER — PATIENT MESSAGE (OUTPATIENT)
Dept: INTERNAL MEDICINE | Facility: CLINIC | Age: 82
End: 2017-08-02

## 2017-08-02 DIAGNOSIS — M19.072 OSTEOARTHRITIS OF JOINTS OF TOES OF BOTH FEET: Primary | ICD-10-CM

## 2017-08-02 DIAGNOSIS — M19.071 OSTEOARTHRITIS OF JOINTS OF TOES OF BOTH FEET: Primary | ICD-10-CM

## 2017-08-07 ENCOUNTER — APPOINTMENT (OUTPATIENT)
Dept: RADIOLOGY | Facility: CLINIC | Age: 82
End: 2017-08-07
Attending: FAMILY MEDICINE
Payer: MEDICARE

## 2017-08-07 DIAGNOSIS — M85.80 OSTEOPENIA, UNSPECIFIED LOCATION: Chronic | ICD-10-CM

## 2017-08-07 DIAGNOSIS — M89.9 DISORDER OF BONE: ICD-10-CM

## 2017-08-07 PROCEDURE — 77080 DXA BONE DENSITY AXIAL: CPT | Mod: TC

## 2017-08-07 PROCEDURE — 77080 DXA BONE DENSITY AXIAL: CPT | Mod: 26,,, | Performed by: INTERNAL MEDICINE

## 2017-08-15 ENCOUNTER — TELEPHONE (OUTPATIENT)
Dept: OPHTHALMOLOGY | Facility: CLINIC | Age: 82
End: 2017-08-15

## 2017-08-15 ENCOUNTER — OFFICE VISIT (OUTPATIENT)
Dept: OPHTHALMOLOGY | Facility: CLINIC | Age: 82
End: 2017-08-15
Payer: MEDICARE

## 2017-08-15 DIAGNOSIS — Z94.7 HISTORY OF CORNEA TRANSPLANT: ICD-10-CM

## 2017-08-15 DIAGNOSIS — Z96.1 PSEUDOPHAKIA: ICD-10-CM

## 2017-08-15 DIAGNOSIS — H40.1134 PRIMARY OPEN ANGLE GLAUCOMA OF BOTH EYES, INDETERMINATE STAGE: Primary | ICD-10-CM

## 2017-08-15 PROCEDURE — 99999 PR PBB SHADOW E&M-EST. PATIENT-LVL I: CPT | Mod: PBBFAC,,, | Performed by: OPHTHALMOLOGY

## 2017-08-15 PROCEDURE — 92012 INTRM OPH EXAM EST PATIENT: CPT | Mod: S$GLB,,, | Performed by: OPHTHALMOLOGY

## 2017-08-15 PROCEDURE — 99499 UNLISTED E&M SERVICE: CPT | Mod: S$GLB,,, | Performed by: OPHTHALMOLOGY

## 2017-08-15 RX ORDER — TAFLUPROST OPTHALMIC 0 MG/.3ML
1 SOLUTION/ DROPS OPHTHALMIC NIGHTLY
Qty: 60 EACH | Refills: 12 | Status: SHIPPED | OUTPATIENT
Start: 2017-08-15 | End: 2017-11-22

## 2017-08-15 NOTE — TELEPHONE ENCOUNTER
----- Message from Kerry Jeffries sent at 8/15/2017  2:18 PM CDT -----  [facility]  Insurance is not covering tafluprost, PF, (ZIOPTAN, PF,) 0.0015 %  Pharmacy would like to be advise of replacement drops pls call  [phone]

## 2017-08-15 NOTE — PROGRESS NOTES
SUBJECTIVE:   Shirley Metz is a 84 y.o. female   Corrected distance visual acuity was 20/50 in the right eye and 20/40 +1 in the left eye.   Chief Complaint   Patient presents with    Glaucoma     pt here to get reastablished with [facility] gor gla.        HPI:  HPI     Glaucoma    Additional comments: pt here to get reastablished with [facility] gor gla.       Last edited by Kiersten Phillips MA on 8/15/2017 11:15 AM. (History)        Assessment /Plan :  1. Primary open angle glaucoma of both eyes, indeterminate stage   Doing well, IOP OD within acceptable range relative to target IOP and no evidence of progression. Continue current treatment. Reviewed importance of continued compliance with treatment and follow up.    IOP OS not within acceptable range relative to target IOP with risk of irreversible visual loss. Better IOP control is recommended. Discussed options, risks, and benefits of additional medication, SLT laser, and/or incisional glaucoma surgery. Reviewed importance of continued compliance with treatment and follow up.     Patient chooses to add Zioptan qhs OS       2. Pseudophakia stable   3. History of cornea transplant followed by Dr. Quintanilla       Return to clinic in 3 to 4 weeks  or as needed.  With 24-2 HVF, Dilation and SDP's and MRX

## 2017-08-15 NOTE — TELEPHONE ENCOUNTER
Per [facility], will try Travatan Z since the insurance will cover this drop. The pharmacy is out of stock of this drop. They will call other pharmacies to see if they have it. They are to call back if they can't find it by 4pm today.

## 2017-08-16 ENCOUNTER — OFFICE VISIT (OUTPATIENT)
Dept: INTERNAL MEDICINE | Facility: CLINIC | Age: 82
End: 2017-08-16
Payer: MEDICARE

## 2017-08-16 VITALS
TEMPERATURE: 98 F | SYSTOLIC BLOOD PRESSURE: 138 MMHG | WEIGHT: 175.63 LBS | OXYGEN SATURATION: 96 % | HEIGHT: 64 IN | BODY MASS INDEX: 29.98 KG/M2 | DIASTOLIC BLOOD PRESSURE: 63 MMHG | HEART RATE: 72 BPM

## 2017-08-16 DIAGNOSIS — M19.041 OSTEOARTHRITIS OF FINGERS OF BOTH HANDS: ICD-10-CM

## 2017-08-16 DIAGNOSIS — F41.9 ANXIETY: ICD-10-CM

## 2017-08-16 DIAGNOSIS — I10 ESSENTIAL HYPERTENSION: ICD-10-CM

## 2017-08-16 DIAGNOSIS — R52 PAIN, GENERALIZED: ICD-10-CM

## 2017-08-16 DIAGNOSIS — G47.00 INSOMNIA, UNSPECIFIED TYPE: ICD-10-CM

## 2017-08-16 DIAGNOSIS — M19.042 OSTEOARTHRITIS OF FINGERS OF BOTH HANDS: ICD-10-CM

## 2017-08-16 DIAGNOSIS — M25.50 ARTHRALGIA, UNSPECIFIED JOINT: ICD-10-CM

## 2017-08-16 DIAGNOSIS — M85.80 OSTEOPENIA, UNSPECIFIED LOCATION: Primary | ICD-10-CM

## 2017-08-16 PROCEDURE — 99499 UNLISTED E&M SERVICE: CPT | Mod: S$GLB,,, | Performed by: FAMILY MEDICINE

## 2017-08-16 PROCEDURE — 3008F BODY MASS INDEX DOCD: CPT | Mod: S$GLB,,, | Performed by: FAMILY MEDICINE

## 2017-08-16 PROCEDURE — 3078F DIAST BP <80 MM HG: CPT | Mod: S$GLB,,, | Performed by: FAMILY MEDICINE

## 2017-08-16 PROCEDURE — 99999 PR PBB SHADOW E&M-EST. PATIENT-LVL III: CPT | Mod: PBBFAC,,, | Performed by: FAMILY MEDICINE

## 2017-08-16 PROCEDURE — 1159F MED LIST DOCD IN RCRD: CPT | Mod: S$GLB,,, | Performed by: FAMILY MEDICINE

## 2017-08-16 PROCEDURE — 99215 OFFICE O/P EST HI 40 MIN: CPT | Mod: S$GLB,,, | Performed by: FAMILY MEDICINE

## 2017-08-16 PROCEDURE — 3075F SYST BP GE 130 - 139MM HG: CPT | Mod: S$GLB,,, | Performed by: FAMILY MEDICINE

## 2017-08-16 PROCEDURE — 1125F AMNT PAIN NOTED PAIN PRSNT: CPT | Mod: S$GLB,,, | Performed by: FAMILY MEDICINE

## 2017-08-16 RX ORDER — ALPRAZOLAM 0.5 MG/1
TABLET ORAL
Qty: 30 TABLET | Refills: 2 | Status: SHIPPED | OUTPATIENT
Start: 2017-08-16 | End: 2018-01-13 | Stop reason: SDUPTHER

## 2017-08-16 NOTE — PROGRESS NOTES
"Subjective:       Patient ID: Shirley Metz is a 84 y.o. female.    Chief Complaint: Hypertension (follow up)    HTN - her readings reviewed. She is back on HCTZ.  She has BP readings that range from the 130s/50s to the 160s/60s.  Diastolic blood pressures are virtually always controlled.  There was 1/1/59/97.  She seems to be more stable without extremely high blood pressures.  Only 5 are above 160 of her multiple readings.  She did have one reading at 171/73 units her notes record that she took an extra metoprolol.  BMD results reviewed in depth.  Using "KT tape", castor oil on hands shoulders. Both work well in 10 minutes. She wants to know if she can use solanpas. Reviewed her allergies and current meds - camphor, menthol, methyl salicylate.  She has an appt with rheum for arthralgias - fingers swollen and exacerbated few weeks ago with jewewlry making - improving but not resolved yet. Reviewed shoulder xray in chart, spine films in chart re OA changes.  She did a lot of walking at several shows and has foot and leg pain for couple days after.      Hypertension   This is a chronic problem. The current episode started more than 1 year ago. The problem has been waxing and waning since onset. The problem is resistant. Associated symptoms include anxiety. Pertinent negatives include no blurred vision, chest pain, headaches, malaise/fatigue, neck pain, PND, shortness of breath or sweats. Agents associated with hypertension include NSAIDs. Past treatments include ACE inhibitors, alpha 1 blockers, beta blockers, calcium channel blockers and diuretics. The current treatment provides no improvement. There are no compliance problems.      Review of Systems   Constitutional: Negative for malaise/fatigue.   HENT: Positive for postnasal drip.    Eyes: Negative for blurred vision.   Respiratory: Negative for shortness of breath.    Cardiovascular: Negative for chest pain and PND.   Musculoskeletal: Positive for " arthralgias. Negative for neck pain.   Neurological: Positive for weakness (frequent - daily - but not persistent  shoulders/arms/legs). Negative for headaches.   Psychiatric/Behavioral: Positive for sleep disturbance.       Objective:      Physical Exam   Constitutional: She is oriented to person, place, and time. She appears well-developed.   HENT:   Head: Normocephalic and atraumatic.   Cardiovascular: Normal rate, regular rhythm and normal heart sounds.    Pulmonary/Chest: Effort normal and breath sounds normal.   Musculoskeletal: She exhibits no edema.   Neurological: She is alert and oriented to person, place, and time.   Skin: Skin is warm and dry.   Psychiatric: She has a normal mood and affect. Her behavior is normal.         Assessment/Plan:     1. Osteopenia, unspecified location  Vitamin D   2. Insomnia, unspecified type  alprazolam (XANAX) 0.5 MG tablet   3. Anxiety  alprazolam (XANAX) 0.5 MG tablet   4. Pain, generalized  Ambulatory referral to Rheumatology   5. Essential hypertension  Basic metabolic panel   6. Osteoarthritis of fingers of both hands  Ambulatory referral to Rheumatology   7. Arthralgia, unspecified joint  Ambulatory referral to Rheumatology    no changes to her blood pressure medication at this time.  I will see her back before the end of the year in 3 months.   Reviewed my rec to try gabapentin for her pains, but will defer per her preference to rheum with an upcoming visit.  Full review of her bone density results performed with patient today all questions answered.  More than 50% of visit was spent in counseling regarding options, recommendations, medication use, side effects, expectations, and precautions.  Total time spent face-to-face was 45 minutes.

## 2017-08-22 ENCOUNTER — OFFICE VISIT (OUTPATIENT)
Dept: RHEUMATOLOGY | Facility: CLINIC | Age: 82
End: 2017-08-22
Payer: MEDICARE

## 2017-08-22 VITALS
BODY MASS INDEX: 30.42 KG/M2 | SYSTOLIC BLOOD PRESSURE: 130 MMHG | WEIGHT: 177.25 LBS | DIASTOLIC BLOOD PRESSURE: 65 MMHG | HEART RATE: 67 BPM

## 2017-08-22 DIAGNOSIS — G89.29 CHRONIC LEFT SHOULDER PAIN: ICD-10-CM

## 2017-08-22 DIAGNOSIS — M25.512 CHRONIC LEFT SHOULDER PAIN: ICD-10-CM

## 2017-08-22 DIAGNOSIS — M15.9 PRIMARY OSTEOARTHRITIS INVOLVING MULTIPLE JOINTS: Primary | ICD-10-CM

## 2017-08-22 PROCEDURE — 1125F AMNT PAIN NOTED PAIN PRSNT: CPT | Mod: S$GLB,,, | Performed by: PHYSICIAN ASSISTANT

## 2017-08-22 PROCEDURE — 99214 OFFICE O/P EST MOD 30 MIN: CPT | Mod: S$GLB,,, | Performed by: PHYSICIAN ASSISTANT

## 2017-08-22 PROCEDURE — 1159F MED LIST DOCD IN RCRD: CPT | Mod: S$GLB,,, | Performed by: PHYSICIAN ASSISTANT

## 2017-08-22 PROCEDURE — 99999 PR PBB SHADOW E&M-EST. PATIENT-LVL IV: CPT | Mod: PBBFAC,,, | Performed by: PHYSICIAN ASSISTANT

## 2017-08-22 PROCEDURE — 3078F DIAST BP <80 MM HG: CPT | Mod: S$GLB,,, | Performed by: PHYSICIAN ASSISTANT

## 2017-08-22 PROCEDURE — 3075F SYST BP GE 130 - 139MM HG: CPT | Mod: S$GLB,,, | Performed by: PHYSICIAN ASSISTANT

## 2017-08-22 PROCEDURE — 3008F BODY MASS INDEX DOCD: CPT | Mod: S$GLB,,, | Performed by: PHYSICIAN ASSISTANT

## 2017-08-22 NOTE — PATIENT INSTRUCTIONS
Increase tumeric dose about 1000-2000mg/day has been shown to be effective     Omega 3 or fish oil can help joints as well    Can go back to therapy if needed     Ok for prn tylenol    Limbrel to consider in future

## 2017-08-22 NOTE — PROGRESS NOTES
Subjective:       Patient ID: Shirley Metz is a 84 y.o. female.    Chief Complaint: osteoarthritis    Shirley Rg was last seen a year ago by Eun for osteoarthritis.  She is back today complaining of pain in her hand, right hand, left shoulder.  She has been in PT ([facility] [address]) for her left shoulder previously, she does home exercises which help.  Muscle weakness started with statin therapy years ago, has never been back to normal.  She is very sensitive to multiple medications including nsaids, prednisone. She avoids steroid shots.  She does take tylenol prn and takes tumeric about 625mg/day. She uses castor oil for her joints. She really likes to stay natural and avoid adding new medications.  She does not want cortisone injections.  She uses KT strips (otc tape) which helps her joints.  Today she just wanted to re establish care with us.       Review of Systems   Constitutional: Negative for chills, fatigue and fever.   HENT: Negative for mouth sores, rhinorrhea and sore throat.    Eyes: Negative for pain and redness.        Glaucoma   Respiratory: Negative for cough and shortness of breath.    Cardiovascular: Negative for chest pain.   Gastrointestinal: Negative for abdominal pain, constipation, diarrhea, nausea and vomiting.   Genitourinary: Negative for dysuria and hematuria.   Musculoskeletal: Positive for arthralgias and myalgias. Negative for joint swelling.        Left wrist trapeziectomy   Skin: Negative for rash.   Neurological: Negative for weakness, numbness and headaches.   Psychiatric/Behavioral: The patient is not nervous/anxious.          Objective:   /65   Pulse 67   Wt 80.4 kg (177 lb 4 oz)   BMI 30.42 kg/m²      Physical Exam   Constitutional: She is oriented to person, place, and time and well-developed, well-nourished, and in no distress.   HENT:   Head: Normocephalic and atraumatic.   Eyes: Pupils are equal, round, and reactive to light. Right eye  exhibits no discharge.   Neck: Normal range of motion.   Cardiovascular: Normal rate, regular rhythm and normal heart sounds.  Exam reveals no friction rub.    Pulmonary/Chest: Effort normal and breath sounds normal. No respiratory distress.   Abdominal: Soft. She exhibits no distension. There is no tenderness.   Lymphadenopathy:     She has no cervical adenopathy.   Neurological: She is alert and oriented to person, place, and time.   Skin: No rash noted. No erythema.     Psychiatric: Mood normal.   Musculoskeletal: Normal range of motion. She exhibits no edema, tenderness or deformity.   Mild oa changes to bli wrists, pip and dips, no synovitis;  Left wrist volar surface with some soft tissue bulging at the radial aspect (prior surgery, trapeziectomy)  Left shoulder ff about 160 degress, mild impingement sign  Right shoulder full rom  donna knees no effusion, full rom           Assessment:       1. Primary osteoarthritis involving multiple joints    2. Chronic left shoulder pain        Impression:  OA multiple joints including donna hands, left shoulder: reactions to multiple meds in the past, avoids nsaids, topical nsaids, prednisone, steroid shots; does take tumeric, uses castor oil, KT strips otc  Chronic left shoulder pain: OA, +impingment, has done well with PT in past, does home exercises that help      Plan:       Recommend increasing her tumeric to bid, takes around 625mg/day   Recommend going back to PT if left shoulder doesn't improve in the next several weeks  rec fish oil/ omega 3 for joints  Ok to take tylenol prn   Can consider limbrel in the future    rtc in 1 year or sooner if needed

## 2017-08-22 NOTE — LETTER
August 22, 2017      Yandy Terrell MD  [address] [place] [address] [address] [place] [id number]           [facility]  [address] [place] [address]  Phone: [phone]  Fax: [phone]          Patient: Shirley Metz   MR Number: [id number]   YOB: 1932   Date of Visit: 8/22/2017       Dear Dr. Yandy Terrell:    Thank you for referring Shirley Metz to me for evaluation. Attached you will find relevant portions of my assessment and plan of care.    If you have questions, please do not hesitate to call me. I look forward to following Shirley Metz along with you.    Sincerely,    Suzan Gregorio PA-C    Enclosure  CC:  No Recipients    If you would like to receive this communication electronically, please contact [email or web address] or [phone] to request more information on [email or web address] Link access.    For providers and/or their staff who would like to refer a patient to [facility], please contact us through our one-stop-shop provider referral line, [facility], at [phone].    If you feel you have received this communication in error or would no longer like to receive these types of communications, please e-mail [email or web address]

## 2017-08-23 ENCOUNTER — TELEPHONE (OUTPATIENT)
Dept: OPHTHALMOLOGY | Facility: CLINIC | Age: 82
End: 2017-08-23

## 2017-08-23 NOTE — TELEPHONE ENCOUNTER
I spoke to the patient. She states that since she started the Travatan Z her left eye has been real dry. She is also experiencing a burning feeling and tightness in her left eye. She does have some redness in both eyes. Patient states that Dr. Quintanilla does not want her eyes to be dry, because she had a corneal transplant in both eyes done. The patient is currently on Refresh 8 times a day, Lotemax qd OU, and Travatan Z qhs OS. The patient is also taking blood pressure medications which can also cause dry eyes. She is taking Chlorthalidone, Metoprolol, Losartan, and Catapres. I will ask Dr. Ware and call the patient back with his response.

## 2017-08-23 NOTE — TELEPHONE ENCOUNTER
I attempted to call the patient. Per Dr. Ware, the patient can try the Zioptan or she can stay on her current treatment. She can continue using the artificial tears prn OU and add Lubricating Oint qhs and prn and [place] 3 Fish Oils 2891-9595 mg po bid

## 2017-08-23 NOTE — TELEPHONE ENCOUNTER
I spoke to the patient. She wants to stay on the Travatan Z for now and she will try the dry eye treatment per Dr. Ware's instructions. The patient will call us back if the new treatment does not help.

## 2017-08-23 NOTE — TELEPHONE ENCOUNTER
----- Message from Yancy Burger sent at 8/23/2017  8:12 AM CDT -----  Contact: pt  She's calling stating that she is having a allergic reaction to eye drops that were prescribed last week, please advise [phone] (home)  [phone](cell)

## 2017-09-07 ENCOUNTER — OFFICE VISIT (OUTPATIENT)
Dept: OPHTHALMOLOGY | Facility: CLINIC | Age: 82
End: 2017-09-07
Payer: MEDICARE

## 2017-09-07 DIAGNOSIS — H01.00B BLEPHARITIS OF UPPER AND LOWER EYELIDS OF BOTH EYES, UNSPECIFIED TYPE: ICD-10-CM

## 2017-09-07 DIAGNOSIS — H40.1134 PRIMARY OPEN ANGLE GLAUCOMA OF BOTH EYES, INDETERMINATE STAGE: Primary | ICD-10-CM

## 2017-09-07 DIAGNOSIS — H01.00A BLEPHARITIS OF UPPER AND LOWER EYELIDS OF BOTH EYES, UNSPECIFIED TYPE: ICD-10-CM

## 2017-09-07 DIAGNOSIS — Z96.1 PSEUDOPHAKIA: ICD-10-CM

## 2017-09-07 DIAGNOSIS — Z94.7 HISTORY OF CORNEA TRANSPLANT: ICD-10-CM

## 2017-09-07 PROCEDURE — 92250 FUNDUS PHOTOGRAPHY W/I&R: CPT | Mod: S$GLB,,, | Performed by: OPHTHALMOLOGY

## 2017-09-07 PROCEDURE — 92014 COMPRE OPH EXAM EST PT 1/>: CPT | Mod: S$GLB,,, | Performed by: OPHTHALMOLOGY

## 2017-09-07 PROCEDURE — 99499 UNLISTED E&M SERVICE: CPT | Mod: S$GLB,,, | Performed by: OPHTHALMOLOGY

## 2017-09-07 PROCEDURE — 99999 PR PBB SHADOW E&M-EST. PATIENT-LVL II: CPT | Mod: PBBFAC,,, | Performed by: OPHTHALMOLOGY

## 2017-09-07 PROCEDURE — 92083 EXTENDED VISUAL FIELD XM: CPT | Mod: S$GLB,,, | Performed by: OPHTHALMOLOGY

## 2017-09-07 RX ORDER — AMOXICILLIN 500 MG
1 CAPSULE ORAL DAILY
COMMUNITY
End: 2024-02-02

## 2017-09-07 NOTE — PROGRESS NOTES
SUBJECTIVE:   Shirley Metz is a 84 y.o. female   Corrected distance visual acuity was 20/50 +1 in the right eye and 20/30 -2 in the left eye.   Chief Complaint   Patient presents with    Glaucoma     3-4 wk hvf, dilation, sdp's, mrx. travatan qhs os        HPI:  HPI     Glaucoma    Additional comments: 3-4 wk hvf, dilation, sdp's, mrx. travatan qhs os           Comments   1. COAG Goal < 19  SLT OD 3/04 (20 to 15) + 3/11 (19 to 15)  SLT OS 5/05 (20 to 14) +5/11 (18-19 to 15) + 3/12 (min resp.) + 3/11/15   (20.5-17.5)  (Cosopt, Xalatan, and Timolol cause Myalgias)  (Alphagan causes redness)  2. PCIOL OU  3. Dry AMD  4. Fuch's Dystrophy   DSAEK OD 4/16 Dr. Quintanilla  DSAEK OS 8/15 Dr. Quintanilla    Lotemax qd ou  Systane prn ou  travatan qhs os       Last edited by Sofia Villar MA on 9/7/2017  3:17 PM. (History)        Assessment /Plan :  1. Primary open angle glaucoma of both eyes, indeterminate stage     Doing well, IOP OD within acceptable range relative to target IOP and no evidence of progression. Continue current treatment. Reviewed importance of continued compliance with treatment and follow up.    IOP OS not within acceptable range relative to target IOP with risk of irreversible visual loss. Better IOP control is recommended. Discussed options, risks, and benefits of additional medication, SLT laser, and/or incisional glaucoma surgery. Reviewed importance of continued compliance with treatment and follow up.     Patient chooses defer and recheck IOP next visit       2. Pseudophakia stable   3. History of cornea transplant    4.      Blepharitis Findings and symptoms consistent with blepharitis.recommending:Warm compresses, Gentle Lid Scrubs and [place] 3 Fish Oils [id number] mg po bid      Return to clinic in 6 to 8 weeks  or as needed.  With IOP Check and GOCT

## 2017-09-17 ENCOUNTER — PATIENT MESSAGE (OUTPATIENT)
Dept: INTERNAL MEDICINE | Facility: CLINIC | Age: 82
End: 2017-09-17

## 2017-09-18 DIAGNOSIS — G89.29 CHRONIC LEFT SHOULDER PAIN: ICD-10-CM

## 2017-09-18 DIAGNOSIS — M25.512 CHRONIC LEFT SHOULDER PAIN: ICD-10-CM

## 2017-09-18 DIAGNOSIS — M15.9 PRIMARY OSTEOARTHRITIS INVOLVING MULTIPLE JOINTS: Primary | ICD-10-CM

## 2017-09-18 NOTE — TELEPHONE ENCOUNTER
She saw rheumatology 8/22/17 after seeing me.  They recommended she see PT if she did not improve.    Can we forward her request to Suzan Gregorio with rheumatology and have them RX the PT? Thanks.    I have written pt a note stating we will do this.

## 2017-09-28 ENCOUNTER — CLINICAL SUPPORT (OUTPATIENT)
Dept: REHABILITATION | Facility: HOSPITAL | Age: 82
End: 2017-09-28
Payer: MEDICARE

## 2017-09-28 DIAGNOSIS — G89.29 CHRONIC LEFT SHOULDER PAIN: ICD-10-CM

## 2017-09-28 DIAGNOSIS — M25.512 CHRONIC LEFT SHOULDER PAIN: ICD-10-CM

## 2017-09-28 DIAGNOSIS — M15.9 PRIMARY OSTEOARTHRITIS INVOLVING MULTIPLE JOINTS: Primary | ICD-10-CM

## 2017-09-28 PROCEDURE — 97140 MANUAL THERAPY 1/> REGIONS: CPT

## 2017-09-28 PROCEDURE — G8984 CARRY CURRENT STATUS: HCPCS | Mod: CJ

## 2017-09-28 PROCEDURE — 97161 PT EVAL LOW COMPLEX 20 MIN: CPT

## 2017-09-28 PROCEDURE — 97014 ELECTRIC STIMULATION THERAPY: CPT

## 2017-09-28 PROCEDURE — G8985 CARRY GOAL STATUS: HCPCS | Mod: CI

## 2017-09-28 NOTE — PROGRESS NOTES
PHYSICAL THERAPY INITIAL OUTPATIENT EVALUATION    Referring Provider:  Suzan Gregorio    Diagnosis:       ICD-10-CM ICD-9-CM    1. Primary osteoarthritis involving multiple joints M15.0 715.09    2. Chronic left shoulder pain M25.512 719.41     G89.29 338.29             Orders:  Evaluate and Treat    Date of Initial Evaluation: 9-28-17      Visit # 1 of 20    SUBJECTIVE:  Patient reports years of pain at L shoulder with insiduous onset.  Pt. Reports that she had PT with mobilization of shoulders that seemed to help and still does exercises. Pt. Has also began having pain in R shoulder. Pt. Reports that pain is relieved by Castor oil and kinesiotape.  Pt. Reports that she is limited with using rollator, has difficulty don/ doff shirt, and reaching overhead.    Current Pain:  0/10, located B shoulders, described as L-sharp and clicking , R- just painful  Worse pain: 6/10 'using it'      OBJECTIVE:    Sensation: B UE intact         R  L  Shoulder A/PROM: Flexion     113  93     Extension   57  40     Abduction   130  100      Adduction   30  15     Internal Rotation  63  59     External Rotation  70  63 click per pt.           R  L  Strength:  Supraspinatus   1+/5  1+/5      Subscapularis   1+/5  1+/5     Latissimus dorsi   4/5  1/5     Rhomboids   4/5  4/5     Upper Trapezius  4/5  4/5      Middle Trapezius  4/5  4/5     Lower Trapezius  4/5  4/5     Levator scapula  4/5  4/5     Biceps    4/5  3+/5     Triceps   4/5  3+/5     shld flexors   1+/5  1+/5     Extensors   4/5  4/5     Abductors   1+/5  1+/5           R  L  Special test:  NEER     -  +    Moon sukhdeep   -  +    Drop arm    -  +    Empty can    -  -      Int. Rot. Functional Reach     T6  S1    GHJ mobility: Spongy end feel with L shoulder flexion, Hard end feel at end range of abduction, crepitus noted with L external and internal rotation,         Tenderness to palpation:  L upper trapezius, L infraspinatus, L bicipital groove, B  biceps        Function: Patient reports 39% disability based on score of the Upper Extremity Functional Scale.      Functional Limitations and Goal:  This patient's primary Physical Therapy goal is to return to their prior level of function (Carryiing G-8984) without limitations.  The patient's current level of impairment is 20-40  percent impaired (CJ) based on their score of 39 percent impaired on the Quick DASH Shoulder Questionnaire.  The patient is expected to achieve a score of 1 to 19 percent impaired ( G-8985  CI ) within 10 treatments.        ASSESSMENT:  The patient is a 84 y.o. year old female who presents to physical therapy with complaints of B shoulder pain.  Patient's impairments include pain B shoulders, decreased B shoulder AROM in all planes, mild weakness R shoulder muscles, mild to moderate weakness L shoulder muscles.  These impairments are limiting patient's ability to reach overhead, don/doff shirt, and use rollator.  Patient's prognosis is  Good.  Patient will benefit from skilled physical therapy intervention to decrease pain, increase AROM, increase strength in B shoulders.  Co-morbidities which may impact the plan of care and potentially impede the patient's progress in therapy include arthritis.    Patients CLINICAL PRESENTATION is STABLE.     Short Term Goals:  1-4 weeks  1. I with HEP  2. 75% R Shoulder AROM  3. Decrease pain B shoulders to less than 4/10     Long Term Goals: 6-8 weeks  1. Return to activities with pain less than 2/10  2.Pt. To have full R shoulder AROM to don/ doff shirt with ease  3. Increase R shoulder strength to 5/5 to lift groceries and moderate size objects.  4. Pt. To have 75% L shoulder AROM to lift objects from overhead cabinets.  4. Increase L shoulder functional internal rotation reach to T6 to bathe opposite side of body.    TREATMENT PROVIDED:  -Manual Therapy: B shoulder joint mobilizations Grade 1-2 Sup-Inf, Inf- Sup, A-P, P-A x 8 min. B shoulder  stretch flexion, abd, external rotation, internal rotation x 16 min.  -Therapeutic Exercise:  N/A  -Modalities: e-stim B shoulders x 10 min. With cold pack  -Evaluation completed  -Education on HEP, condition and posture, activity modification    PLAN:  Patient will benefit from physical therapy (2) x/week for (6-8) weeks including manual therapy, therapeutic exercise, functional activities, modalities, and patient education.    Thank you for this referral.    These services are reasonable and necessary for the conditions set forth above while under my care.

## 2017-10-02 ENCOUNTER — CLINICAL SUPPORT (OUTPATIENT)
Dept: REHABILITATION | Facility: HOSPITAL | Age: 82
End: 2017-10-02
Payer: MEDICARE

## 2017-10-02 DIAGNOSIS — M15.9 PRIMARY OSTEOARTHRITIS INVOLVING MULTIPLE JOINTS: Primary | ICD-10-CM

## 2017-10-02 DIAGNOSIS — M25.512 CHRONIC LEFT SHOULDER PAIN: ICD-10-CM

## 2017-10-02 DIAGNOSIS — G89.29 CHRONIC LEFT SHOULDER PAIN: ICD-10-CM

## 2017-10-02 PROCEDURE — 97140 MANUAL THERAPY 1/> REGIONS: CPT

## 2017-10-02 PROCEDURE — 97014 ELECTRIC STIMULATION THERAPY: CPT

## 2017-10-02 PROCEDURE — 97110 THERAPEUTIC EXERCISES: CPT

## 2017-10-02 NOTE — PROGRESS NOTES
PHYSICAL THERAPY Daily Note    Referring Provider:  Suzan Gregorio    Diagnosis:       ICD-10-CM ICD-9-CM    1. Primary osteoarthritis involving multiple joints M15.0 715.09    2. Chronic left shoulder pain M25.512 719.41     G89.29 338.29             Orders:  Evaluate and Treat    Date of Initial Evaluation: 9-28-17      Visit # 1 of 20    SUBJECTIVE:  Patient reports episodes of BP decreasing this weekend causing her to not feel well.  Pt. Reports that her L shoulder felt better and she's been able to sleep on her sides.     Current Pain: L shoulder 3-4/10  R shoulder 0/10- pain increases with use      OBJECTIVE:    TREATMENT PROVIDED:    -Manual Therapy: L shoulder joint mobilizations Grade 1-2 Sup-Inf, Inf- Sup, A-P, P-A x 4 min. R shoulder joint mob Grade 1 Sup-Inf, A-P x 2 min. Passive shoulder stretch B flexion, B abd, L external rotation, B internal rotation x 12 min., STM x 8 min. To lateral deltoid and proximal brachialis  -Therapeutic Exercise: Shoulder:  flexion with wand x2 min., abduction with wand 2 min., ext. Rotation with wand x 4 min., scapular retraction x 2 min., biceps curls x 2 min., B triceps 2 min., shoulder shrugs x 2 min.  -Modalities: e-stim B shoulders x 10 min. With MHP; US x 8 min. To L posterior shoulder in open pack position  -Education on HEP, condition      ASSESSMENT:  Mild Muscle tension noted L lateral deltoid and proximal brachioradialis which dissolved with STM. However trigger point still palpable at proximal brachioradialis. No rigidity noted with L shoulder passive flexion.  Pt. Still moderately limited with passive L shoulder flexion and L shoulder abduction.  Pt. Severely limited with L shoulder passive external rotation.  Pt. Had pain with R shoulder passive stretching. Pt. Had pain with B shoulder abduction and fatigue with strengthening therex allowing only one set of most exercises. Will add internal rotation stretch next visit for B shoulders.      PLAN:  Patient will  benefit from physical therapy (2) x/week for (6-8) weeks including manual therapy, therapeutic exercise, functional activities, modalities, and patient education.    Thank you for this referral.    These services are reasonable and necessary for the conditions set forth above while under my care.

## 2017-10-05 ENCOUNTER — CLINICAL SUPPORT (OUTPATIENT)
Dept: REHABILITATION | Facility: HOSPITAL | Age: 82
End: 2017-10-05
Payer: MEDICARE

## 2017-10-05 DIAGNOSIS — M15.9 PRIMARY OSTEOARTHRITIS INVOLVING MULTIPLE JOINTS: Primary | ICD-10-CM

## 2017-10-05 DIAGNOSIS — G89.29 CHRONIC LEFT SHOULDER PAIN: ICD-10-CM

## 2017-10-05 DIAGNOSIS — M25.512 CHRONIC LEFT SHOULDER PAIN: ICD-10-CM

## 2017-10-05 PROCEDURE — 97110 THERAPEUTIC EXERCISES: CPT

## 2017-10-05 PROCEDURE — 97140 MANUAL THERAPY 1/> REGIONS: CPT

## 2017-10-05 PROCEDURE — 97014 ELECTRIC STIMULATION THERAPY: CPT

## 2017-10-05 NOTE — PROGRESS NOTES
PHYSICAL THERAPY Daily Note    Referring Provider:  Suzan Gregorio    Diagnosis:       ICD-10-CM ICD-9-CM    1. Primary osteoarthritis involving multiple joints M15.0 715.09    2. Chronic left shoulder pain M25.512 719.41     G89.29 338.29             Orders:  Evaluate and Treat    Date of Initial Evaluation: 9-28-17      Visit # 3 of 20    SUBJECTIVE: Pt. Reports not feeling well.  Pt. Reports having pain with sciatica after last treatment which may be from the way she was lying on treatment table.   Pt. Reports that she was not able to put weight on leg yesterday but pain resolved.  Pt. Reports increased pain L shoulder at proximal brachioradialis.      Current Pain: L shoulder >6/10 with movement, no movement 6/10;  2/10 R shoulder       OBJECTIVE:    TREATMENT PROVIDED:    -Manual Therapy: , STM x 12 min. To B lateral deltoid and proximal brachialis   -Therapeutic Exercise: scapular retraction x 1 min., shoulder shrugs x 2 min., shoulder protraction x 1 min. B isometric: internal rotation x 2 min., adduction x 2 min., flexion x 2 min.  -Modalities: e-stim R shoulder and L proximal brachioradialis x 10 min. With MHP; US x 4 min. To L posterior shoulder and x 4 min. To L proximal brachioradialis  -Education on HEP, condition      ASSESSMENT: Pt. Not motivated/ willing to do shoulder ROM exercise or strengthening therex with resistance secondary pain per pt. Pt. Began isometric strengthening therex for shoulders which she tolerated well but some pain did occur at R shoulder.  Trigger point noted B proximal brachioradialis and mild to moderate muscle tension noted B lateral deltoids. Will continue focusing US on B proximal brachioradialis to help decrease muscle tension and pain.     PLAN:  Patient will benefit from physical therapy (2) x/week for (6-8) weeks including manual therapy, therapeutic exercise, functional activities, modalities, and patient education.    Thank you for this referral.    These services are  reasonable and necessary for the conditions set forth above while under my care.

## 2017-10-09 ENCOUNTER — CLINICAL SUPPORT (OUTPATIENT)
Dept: REHABILITATION | Facility: HOSPITAL | Age: 82
End: 2017-10-09
Payer: MEDICARE

## 2017-10-09 DIAGNOSIS — G89.29 CHRONIC LEFT SHOULDER PAIN: ICD-10-CM

## 2017-10-09 DIAGNOSIS — M25.512 CHRONIC LEFT SHOULDER PAIN: ICD-10-CM

## 2017-10-09 DIAGNOSIS — M15.9 PRIMARY OSTEOARTHRITIS INVOLVING MULTIPLE JOINTS: Primary | ICD-10-CM

## 2017-10-09 PROCEDURE — 97014 ELECTRIC STIMULATION THERAPY: CPT

## 2017-10-09 PROCEDURE — 97110 THERAPEUTIC EXERCISES: CPT

## 2017-10-09 PROCEDURE — 97140 MANUAL THERAPY 1/> REGIONS: CPT

## 2017-10-09 NOTE — PROGRESS NOTES
PHYSICAL THERAPY Daily Note    Referring Provider:  Suzan Gregorio    Diagnosis:       ICD-10-CM ICD-9-CM    1. Primary osteoarthritis involving multiple joints M15.0 715.09    2. Chronic left shoulder pain M25.512 719.41     G89.29 338.29             Orders:  Evaluate and Treat    Date of Initial Evaluation: 9-28-17      Visit # 4 of 20    SUBJECTIVE: Pt. Reports that her shoulders feel better.  Pt. Reports doing lots of driving this weekend which may have increased the muscle tension in R shoulder.      Current Pain: 2/10 B shoulders      OBJECTIVE:    TREATMENT PROVIDED:    -Manual Therapy: , STM x 10 min. To B lateral deltoid and proximal brachialis   -Therapeutic Exercise: scapular retraction x 1 min., shoulder shrugs x 1 min., shoulder protraction x 1 min. B isometric: internal rotation x 2 min., adduction x 2 min., flexion x 2 min, extension x 2 min., external rotation x 2 min.   -Modalities: e-stim R shoulder and L proximal brachioradialis x 10 min. With MHP; US x 4 min. To B proximal brachioradialis x 4 min. Each side  -Education on HEP, condition      ASSESSMENT: More muscle tension noted R proximal brachialis and R lateral deltoid today with little resolve with US and STM.  Less muscle tension L lateral deltoid and L proximal brachialis region today and resolved well with STM and US  . Pt. Tolerated isometric strengthening to shoulders and scapula Movements well.  Will advance patients to trying 2 sets of therex instead of one to further her strengthening.  ROM exercises seem to significantly increase patients pain.    PLAN:  Patient will benefit from physical therapy (2) x/week for (6-8) weeks including manual therapy, therapeutic exercise, functional activities, modalities, and patient education.    Thank you for this referral.    These services are reasonable and necessary for the conditions set forth above while under my care.

## 2017-10-16 ENCOUNTER — CLINICAL SUPPORT (OUTPATIENT)
Dept: REHABILITATION | Facility: HOSPITAL | Age: 82
End: 2017-10-16
Payer: MEDICARE

## 2017-10-16 DIAGNOSIS — M25.512 CHRONIC LEFT SHOULDER PAIN: ICD-10-CM

## 2017-10-16 DIAGNOSIS — G89.29 CHRONIC LEFT SHOULDER PAIN: ICD-10-CM

## 2017-10-16 DIAGNOSIS — M15.9 PRIMARY OSTEOARTHRITIS INVOLVING MULTIPLE JOINTS: Primary | ICD-10-CM

## 2017-10-16 PROCEDURE — 97110 THERAPEUTIC EXERCISES: CPT

## 2017-10-16 PROCEDURE — 97140 MANUAL THERAPY 1/> REGIONS: CPT

## 2017-10-16 PROCEDURE — 97014 ELECTRIC STIMULATION THERAPY: CPT

## 2017-10-16 NOTE — PROGRESS NOTES
PHYSICAL THERAPY Daily Note    Referring Provider:  Suzan Gregorio    Diagnosis:       ICD-10-CM ICD-9-CM    1. Primary osteoarthritis involving multiple joints M15.0 715.09    2. Chronic left shoulder pain M25.512 719.41     G89.29 338.29             Orders:  Evaluate and Treat    Date of Initial Evaluation: 9-28-17      Visit # 5 of 20    SUBJECTIVE:  Pt. Reports having BP issues limited her coming to one of her appointments last week.  Pt. Reports trying an exercises with a dumbbell that that increased her shoulder pain.    Current Pain: 5-6/10 B shoulders      OBJECTIVE:    TREATMENT PROVIDED:    -Manual Therapy: , STM x 10 min. To B lateral deltoid and proximal brachialis   -Therapeutic Exercise: scapular retraction x 1 min., shoulder shrugs x 1 min., shoulder protraction x 1 min. B isometric: internal rotation x 2 min., adduction x 2 min., flexion x 2 min, extension x 2 min., external rotation x 2 min.   -Modalities: e-stim R shoulder and L proximal brachioradialis x 10 min. With MHP; US x 4 min. To B proximal brachioradialis (x 4 min. Each side)  -Education on HEP, condition      ASSESSMENT: R lateral deltoid more prominent than L lateral deltoid.  US and STM able to resolve muscle tension L lateral deltoid but no change noted R lateral deltoid.  Pt. Tolerates isometric strengthening therex and scapular mobilization therex well.  Pt. Educated to continuing trying to do bilateral shoulder flexion and B abduction stretches to maintain ROM at home.  Pt.'s pain increased from last week in B upper arm regions.  Cont. Skilled PT to resolve muscle tension lateral arms bilaterally and decrease pain.      PLAN:  Patient will benefit from physical therapy (2) x/week for (6-8) weeks including manual therapy, therapeutic exercise, functional activities, modalities, and patient education.    Thank you for this referral.    These services are reasonable and necessary for the conditions set forth above while under my care.

## 2017-10-20 ENCOUNTER — CLINICAL SUPPORT (OUTPATIENT)
Dept: REHABILITATION | Facility: HOSPITAL | Age: 82
End: 2017-10-20
Payer: MEDICARE

## 2017-10-20 DIAGNOSIS — M25.512 CHRONIC LEFT SHOULDER PAIN: ICD-10-CM

## 2017-10-20 DIAGNOSIS — G89.29 CHRONIC LEFT SHOULDER PAIN: ICD-10-CM

## 2017-10-20 DIAGNOSIS — M15.9 PRIMARY OSTEOARTHRITIS INVOLVING MULTIPLE JOINTS: Primary | ICD-10-CM

## 2017-10-20 PROCEDURE — 97140 MANUAL THERAPY 1/> REGIONS: CPT

## 2017-10-20 PROCEDURE — 97014 ELECTRIC STIMULATION THERAPY: CPT

## 2017-10-20 PROCEDURE — 97110 THERAPEUTIC EXERCISES: CPT

## 2017-10-20 NOTE — PROGRESS NOTES
PHYSICAL THERAPY Daily Note    Referring Provider:  Suzan Gregorio    Diagnosis:       ICD-10-CM ICD-9-CM    1. Primary osteoarthritis involving multiple joints M15.0 715.09    2. Chronic left shoulder pain M25.512 719.41     G89.29 338.29             Orders:  Evaluate and Treat    Date of Initial Evaluation: 9-28-17      Visit # 6 of 20    SUBJECTIVE:  Pt. Reports pain B lateral deltoids.  Pt. Reports that pain was low this morning but after moving around throughout the day it has increased. Pt. Reports that she has not been doing lots of jewelry beading to rest arms.      Current Pain: 2-4/10 B lateral deltoids/ B prox. brachialis      OBJECTIVE:    TREATMENT PROVIDED:    -Manual Therapy: , STM x 15 min. To B lateral deltoid and proximal brachialis   -Therapeutic Exercise: scapular retraction x 1 min., shoulder shrugs x 1 min., shoulder protraction x 1 min. B isometric: internal rotation x 2 min., adduction x 2 min., flexion x 2 min, extension x 2 min., external rotation x 2 min.   -Modalities: e-stim R shoulder and L proximal brachioradialis x 10 min. With MHP; US x 8 min. To L proximal brachioradialis   -Education on HEP, condition      ASSESSMENT: Pt. Pain level decreased from last visit.  More muscle tension noted R upper arm than L upper arm.  Only mild muscle tension noted L upper arm after STM.  Mild to moderate muscle tension still noted R upper arm after US and STM.  Pt. Cont. With isometric strengthening to shoulders and scapula exercises without resistance.  Cont. Skilled PT to further decrease muscle tension B lateral upper arms.    PLAN:  Patient will benefit from physical therapy (2) x/week for (6-8) weeks including manual therapy, therapeutic exercise, functional activities, modalities, and patient education.    Thank you for this referral.    These services are reasonable and necessary for the conditions set forth above while under my care.

## 2017-10-23 ENCOUNTER — CLINICAL SUPPORT (OUTPATIENT)
Dept: REHABILITATION | Facility: HOSPITAL | Age: 82
End: 2017-10-23
Payer: MEDICARE

## 2017-10-23 DIAGNOSIS — M15.9 PRIMARY OSTEOARTHRITIS INVOLVING MULTIPLE JOINTS: Primary | ICD-10-CM

## 2017-10-23 DIAGNOSIS — G89.29 CHRONIC LEFT SHOULDER PAIN: ICD-10-CM

## 2017-10-23 DIAGNOSIS — M25.512 CHRONIC LEFT SHOULDER PAIN: ICD-10-CM

## 2017-10-23 PROCEDURE — 97140 MANUAL THERAPY 1/> REGIONS: CPT

## 2017-10-23 PROCEDURE — 97014 ELECTRIC STIMULATION THERAPY: CPT

## 2017-10-23 PROCEDURE — 97110 THERAPEUTIC EXERCISES: CPT

## 2017-10-23 PROCEDURE — 97035 APP MDLTY 1+ULTRASOUND EA 15: CPT

## 2017-10-23 NOTE — PROGRESS NOTES
PHYSICAL THERAPY Daily Note    Referring Provider:  Suzan Gregorio    Diagnosis:       ICD-10-CM ICD-9-CM    1. Primary osteoarthritis involving multiple joints M15.0 715.09    2. Chronic left shoulder pain M25.512 719.41     G89.29 338.29             Orders:  Evaluate and Treat    Date of Initial Evaluation: 9-28-17      Visit # 7 of 20    SUBJECTIVE:  Pt. Reported that her shoulders feel pretty good today.    Current Pain: 2/10 B prox. brachialis      OBJECTIVE:    TREATMENT PROVIDED:    -Manual Therapy:  STM x 15 min. To B lateral deltoid and proximal brachialis   -Therapeutic Exercise: scapular retraction x 1 min., shoulder shrugs x 1 min., B isometric: internal rotation x 2 min., adduction x 2 min., flexion x 4 min, extension x 2 min., external rotation x 2 min.   -Modalities: e-stim B lataral deltoid/ proximal brachioradialis x 10 min. With MHP; US x 6 min. To R proximal brachialis, x 3 min. To L proximal brachialis  -Education on HEP, condition      ASSESSMENT: Muscle tension  In L distal bicep and L lateral brachialis resolved after STM and US . Mild muscle tension continued at R brachialis even after STM and US . Pt. Still required cues with isometric strengthening therex for shoulders.  Pt's pain level lower than last visit.  Cont. Skilled PT to make sure muscle tension resolves completely in upper arms to help decrease pain and tolerate hobbies and household chores.      PLAN:  Patient will benefit from physical therapy (2) x/week for (6-8) weeks including manual therapy, therapeutic exercise, functional activities, modalities, and patient education.    Thank you for this referral.    These services are reasonable and necessary for the conditions set forth above while under my care.s

## 2017-10-26 ENCOUNTER — CLINICAL SUPPORT (OUTPATIENT)
Dept: REHABILITATION | Facility: HOSPITAL | Age: 82
End: 2017-10-26
Payer: MEDICARE

## 2017-10-26 DIAGNOSIS — M25.512 CHRONIC LEFT SHOULDER PAIN: ICD-10-CM

## 2017-10-26 DIAGNOSIS — M15.9 PRIMARY OSTEOARTHRITIS INVOLVING MULTIPLE JOINTS: Primary | ICD-10-CM

## 2017-10-26 DIAGNOSIS — G89.29 CHRONIC LEFT SHOULDER PAIN: ICD-10-CM

## 2017-10-26 PROCEDURE — G8984 CARRY CURRENT STATUS: HCPCS | Mod: CJ

## 2017-10-26 PROCEDURE — 97140 MANUAL THERAPY 1/> REGIONS: CPT

## 2017-10-26 PROCEDURE — 97164 PT RE-EVAL EST PLAN CARE: CPT

## 2017-10-26 PROCEDURE — G8985 CARRY GOAL STATUS: HCPCS | Mod: CI

## 2017-10-26 PROCEDURE — 97035 APP MDLTY 1+ULTRASOUND EA 15: CPT

## 2017-10-26 NOTE — PROGRESS NOTES
PHYSICAL THERAPY Re-Evaluation    Referring Provider:  Suazn Gregorio    Diagnosis:       ICD-10-CM ICD-9-CM    1. Primary osteoarthritis involving multiple joints M15.0 715.09    2. Chronic left shoulder pain M25.512 719.41     G89.29 338.29             Orders:  Evaluate and Treat    Date of Initial Evaluation: 9-28-17      Visit # 8 of 20    SUBJECTIVE:  Pt. Reports fatgiue in arms and arms felt 'jittery' after e-stim last treatment which lasted until last night.  Pt. Reports that she had redness where electrodes were placed last visit.      Current Pain:  410, located B upper arms      OBJECTIVE:    Sensation:  decreased sensation L lateral upper arm            Evaluation    Reeval         R  L   R  L  Shoulder A/PROM: Flexion     113  93   123  120     Extension   57  40   WnL  WNL     Abduction   130  100   115  105        Adduction   30  15   20  35     Internal Rotation  63  59   45  65     External Rotation  70  63 'click' per pt. 85  63              Evaluation   Re-Eval          R  L   R  L  Strength:  Supraspinatus   1+/5  1+/5   1+/5  1+/5     Subscapularis   1+/5  1+/5   1+/5  1+/5     Latissimus dorsi   4/5  1/5   4/5  1/5     Rhomboids   4/5  4/5   4/5  4/5     Upper Trapezius  4/5  4/5   4/5  4/5     Middle Trapezius  4/5  4/5   4/5  4/5     Lower Trapezius  4/5  4/5   4/5  4/5     Levator scapula  4/5  4/5   4/5  4/5     Biceps    4/5  3+/5   4+/5  4+/5     Triceps   4/5  3+/5   4/5  4/5     shld flexors   1+/5  1+/5   1+/5  1+/5     Extensors   4/5  4/5   5/5  5/5     Abductors   1+/5  1+/5   1+/5  1+/5           R  L   R  L  Special test:  NEER     -  +   -  +    Moon sukhdeep   -  +   -  +    Drop arm    -  +   -  -    Empty can    -  -   -  -      Int. Rot. Functional Reach     T6  S1   T7  T12    GHJ mobility: crepitus noted with L external and L internal rotation      Tenderness to palpation:  L upper trapezius, L lateral suprapinatus fossa         Function: Patient reports 39% disability  based on score of the Upper Extremity Functional Scale on [facility] 10-2-17      Functional Limitations and Goal:  This patient's primary Physical Therapy goal is to return to their prior level of function (Carryiing G-8984) without limitations.  The patient's current level of impairment is 20-40  percent impaired (CJ) based on their score of 39 percent impaired on the Quick DASH Shoulder Questionnaire.  The patient is expected to achieve a score of 1 to 19 percent impaired ( G-8985  CI ) within 10 treatments.        ASSESSMENT: Pt. Has made progress with skilled physical therapy.  Pt. Has improved AROM  Shoulders with flexion, L horizontal adduction, L internal rotation.  Strength has improved in B biceps, B triceps, and B shoulder extensors.  Pt. Has less tenderness in B shoulders.  Most of patients pain is located in B brachialis with muscle tension noted bilaterally.  US and STM able to resolve L brachialis muscle tension, but only mildly resolved muscle tension R brachialis.  Pt. Doing well with shoulder stretches with HEP.  Will cont. PT x 1-2 more weeks to resolve muscle tension B brachialis.  No therex today to help decrease fatigue patient having in upper extremities today.       Short Term Goals:  1-4 weeks  1. I with HEP MET  2. 75% R Shoulder AROM Not Met  3. Decrease pain B shoulders to less than 4/10 Not Met Consistently     Long Term Goals: 6-8 weeks  1. Return to activities with pain less than 2/10 Not Met  2.Pt. To have full R shoulder AROM to don/ doff shirt with ease Not Met  3. Increase R shoulder strength to 5/5 to lift groceries and moderate size objects. Not Met  4. Pt. To have 75% L shoulder AROM to lift objects from overhead cabinets. Not Met  4. Increase L shoulder functional internal rotation reach to T6 to bathe opposite side of body. Not Met    TREATMENT PROVIDED:  -Manual Therapy: STM x 14 min. To B lateral proximal brachioradialis  -Therapeutic Exercise:  N/A  -Modalities: US x 6 min. To R  brachialis, US x 3 min. To L brachialis  -Re-Evaluation completed      PLAN:  Patient will benefit from physical therapy (2) x/week for (6-8) weeks including manual therapy, therapeutic exercise, functional activities, modalities, and patient education.    Thank you for this referral.    These services are reasonable and necessary for the conditions set forth above while under my care.

## 2017-10-30 ENCOUNTER — CLINICAL SUPPORT (OUTPATIENT)
Dept: REHABILITATION | Facility: HOSPITAL | Age: 82
End: 2017-10-30
Payer: MEDICARE

## 2017-10-30 DIAGNOSIS — G89.29 CHRONIC LEFT SHOULDER PAIN: ICD-10-CM

## 2017-10-30 DIAGNOSIS — M25.512 CHRONIC LEFT SHOULDER PAIN: ICD-10-CM

## 2017-10-30 DIAGNOSIS — M15.9 PRIMARY OSTEOARTHRITIS INVOLVING MULTIPLE JOINTS: Primary | ICD-10-CM

## 2017-10-30 PROCEDURE — 97140 MANUAL THERAPY 1/> REGIONS: CPT

## 2017-10-30 PROCEDURE — 97110 THERAPEUTIC EXERCISES: CPT

## 2017-10-30 PROCEDURE — 97150 GROUP THERAPEUTIC PROCEDURES: CPT

## 2017-10-30 PROCEDURE — 97035 APP MDLTY 1+ULTRASOUND EA 15: CPT

## 2017-10-30 NOTE — PROGRESS NOTES
PHYSICAL THERAPY Daily Note    Referring Provider:  Suzan Gregorio    Diagnosis:       ICD-10-CM ICD-9-CM    1. Primary osteoarthritis involving multiple joints M15.0 715.09    2. Chronic left shoulder pain M25.512 719.41     G89.29 338.29             Orders:  Evaluate and Treat    Date of Initial Evaluation: 9-28-17      Visit # 9 of 20    SUBJECTIVE:  Pt. Reports pain lower cervical region and B upper trapezius with trouble sleeping and pain in lateral upper arms with driving.  Pt. Reports still limited with L shoulder flexion.  Pt. Reports still having discomfort in L lateral upper arm from e-stim 2 visits ago. Pt. Reports redness still occurring B UE from electrodes.    Current Pain:  410, located B upper arms      OBJECTIVE:    TREATMENT PROVIDED:  -Manual Therapy: STM x 14 min. To B lateral proximal brachioradialis/ L distal biceps; STM x 6 min. B upper trapezius and B cervical paraspinals   -Therapeutic Exercise: scapular retraction x 2 min., isometric: int. Rot. X 2 min., adduction x 2 min., flexion x 2 min., abduction x 2 min.   -Modalities: US x 4 1/2 min. To R brachialis, US x 3 1/2 min. To L  Prox. Brachialis/ distal L biceps trigger point        ASSESSMENT: No redness noted B upper arms however pt. Does have tiny red bumps along B lateral arms.  Pt. Cont. To have moderate muscle tension lateral proximal brachialis after US and STM.  L proximal brachialis muscle tension and distal L biceps trigger point releases with STM. US began to lateral supraspinatus fossa secondary patients complaint of discomfort that region.  STM to B upper trapezius and B cervical paraspinals with mild muscle tension noted.  If no further decrease in pain noted next visit will decrease pt. To 1x/wk to make sure pain level doesn't increase without PT.      PLAN:  Patient will benefit from physical therapy (2) x/week for (6-8) weeks including manual therapy, therapeutic exercise, functional activities, modalities, and patient  education.    Thank you for this referral.    These services are reasonable and necessary for the conditions set forth above while under my care.

## 2017-11-02 ENCOUNTER — OFFICE VISIT (OUTPATIENT)
Dept: OPHTHALMOLOGY | Facility: CLINIC | Age: 82
End: 2017-11-02
Payer: MEDICARE

## 2017-11-02 DIAGNOSIS — H40.1134 PRIMARY OPEN ANGLE GLAUCOMA OF BOTH EYES, INDETERMINATE STAGE: Primary | ICD-10-CM

## 2017-11-02 DIAGNOSIS — Z94.7 HISTORY OF CORNEA TRANSPLANT: ICD-10-CM

## 2017-11-02 DIAGNOSIS — H04.129 DRY EYE: ICD-10-CM

## 2017-11-02 DIAGNOSIS — Z96.1 PSEUDOPHAKIA: ICD-10-CM

## 2017-11-02 PROCEDURE — 99999 PR PBB SHADOW E&M-EST. PATIENT-LVL II: CPT | Mod: PBBFAC,,, | Performed by: OPHTHALMOLOGY

## 2017-11-02 PROCEDURE — 92133 CPTRZD OPH DX IMG PST SGM ON: CPT | Mod: S$GLB,,, | Performed by: OPHTHALMOLOGY

## 2017-11-02 PROCEDURE — 99499 UNLISTED E&M SERVICE: CPT | Mod: S$GLB,,, | Performed by: OPHTHALMOLOGY

## 2017-11-02 PROCEDURE — 92012 INTRM OPH EXAM EST PATIENT: CPT | Mod: S$GLB,,, | Performed by: OPHTHALMOLOGY

## 2017-11-02 NOTE — PROGRESS NOTES
SUBJECTIVE:   Shirley Metz is a 84 y.o. female   Corrected distance visual acuity was 20/60 +2 in the right eye and 20/50 +2 in the left eye.   Chief Complaint   Patient presents with    Glaucoma        HPI:  HPI     Patient returns for a 9 week iop check, patient states she is 100%   compliant with drop usage.        1. COAG Goal < 19  SLT OD 3/04 (20 to 15) + 3/11 (19 to 15)  SLT OS 5/05 (20 to 14) +5/11 (18-19 to 15) + 3/12 (min resp.) + 3/11/15   (20.5-17.5)  (Cosopt, Xalatan, and Timolol cause Myalgias)  (Alphagan causes redness) (zioptan too expensive)  2. PCIOL OU  3. Dry AMD  4. Fuch's Dystrophy   DSAEK OD 4/16 Dr. Quintanilla  DSAEK OS 8/15 Dr. Quintanilla    Lotemax qd ou  Systane prn ou  Travatan qhs os    Last edited by RACHELLE Sanchez on 11/2/2017  9:27 AM. (History)        Assessment /Plan :  1. Primary open angle glaucoma of both eyes, indeterminate stage Doing well, IOP within acceptable range relative to target IOP and no evidence of progression. Continue current treatment. Reviewed importance of continued compliance with treatment and follow up.   2. Pseudophakia stable   3. 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      Dry Eyes, bilateral recommend artificial tears prn OU      Return to clinic in 3 months  or as needed.  With IOP Check

## 2017-11-06 ENCOUNTER — CLINICAL SUPPORT (OUTPATIENT)
Dept: REHABILITATION | Facility: HOSPITAL | Age: 82
End: 2017-11-06
Payer: MEDICARE

## 2017-11-06 DIAGNOSIS — M25.512 CHRONIC LEFT SHOULDER PAIN: ICD-10-CM

## 2017-11-06 DIAGNOSIS — M15.9 PRIMARY OSTEOARTHRITIS INVOLVING MULTIPLE JOINTS: Primary | ICD-10-CM

## 2017-11-06 DIAGNOSIS — G89.29 CHRONIC LEFT SHOULDER PAIN: ICD-10-CM

## 2017-11-06 PROCEDURE — 97035 APP MDLTY 1+ULTRASOUND EA 15: CPT

## 2017-11-06 PROCEDURE — 97110 THERAPEUTIC EXERCISES: CPT

## 2017-11-06 PROCEDURE — 97140 MANUAL THERAPY 1/> REGIONS: CPT

## 2017-11-06 NOTE — PROGRESS NOTES
PHYSICAL THERAPY Daily Note    Referring Provider:  Suzan Gregorio    Diagnosis:       ICD-10-CM ICD-9-CM    1. Primary osteoarthritis involving multiple joints M15.0 715.09    2. Chronic left shoulder pain M25.512 719.41     G89.29 338.29             Orders:  Evaluate and Treat    Date of Initial Evaluation: 9-28-17      Visit # 10 of 20    SUBJECTIVE:  Pt. Reports that her shoulders are better.  Pt. Reports more difficulty with L shoulder to get AROM for donning shirts.  Pt. Reports problems with eyes secondary glaucoma and side effect from eye medicine make her more fatigue.      Current Pain:  410, located B upper arms      OBJECTIVE:    TREATMENT PROVIDED:  -Manual Therapy: STM x 10 min. L supraspinatus and L upper trapezius ; STM x 6 min.  To R distal lateral deltoids and proximal brachialis  -Therapeutic Exercise: flexion with wand x 2 min., abd with wand x 2min., ext. Rot. With wand x 2 min.; isometric: int. Rot. X 2 min., adduction x 2 min., flexion x 2 min., extension x 2 min.   -Modalities: US x 3 min.  To  L suprapinatus fossa; US x 3 min. To L upper trapezius;  US x 3 min. To R Prox. Brachialis/ distal R lateral deltoid        ASSESSMENT: Pt.  Noted to still have muscle tension R distal lateral deltoid and R proximal brachialis not relieved with US and STM today.  Pt. Had tenderness and muscle tension noted supraspinatus fossa and L upper trapezius.  US and STM to L supraspinatus fossa and L upper trapezius which helped resolved some muscle tension.  Pt. Has about 50% B shoulder flexion and abduction. Pt. Mildly limited with active B shoulder external rotation with pain.  B shoulder internal rotation WNL B.  Began stretches to shoulder muscles again during PT session to work on increasing AROM.     PLAN:  Patient will benefit from physical therapy (2) x/week for (6-8) weeks including manual therapy, therapeutic exercise, functional activities, modalities, and patient education.    Thank you for this  referral.    These services are reasonable and necessary for the conditions set forth above while under my care.

## 2017-11-10 ENCOUNTER — CLINICAL SUPPORT (OUTPATIENT)
Dept: REHABILITATION | Facility: HOSPITAL | Age: 82
End: 2017-11-10
Payer: MEDICARE

## 2017-11-10 DIAGNOSIS — G89.29 CHRONIC LEFT SHOULDER PAIN: ICD-10-CM

## 2017-11-10 DIAGNOSIS — M25.512 CHRONIC LEFT SHOULDER PAIN: ICD-10-CM

## 2017-11-10 DIAGNOSIS — M15.9 PRIMARY OSTEOARTHRITIS INVOLVING MULTIPLE JOINTS: Primary | ICD-10-CM

## 2017-11-10 PROCEDURE — 97035 APP MDLTY 1+ULTRASOUND EA 15: CPT

## 2017-11-10 PROCEDURE — 97140 MANUAL THERAPY 1/> REGIONS: CPT

## 2017-11-10 PROCEDURE — 97110 THERAPEUTIC EXERCISES: CPT

## 2017-11-10 NOTE — PROGRESS NOTES
PHYSICAL THERAPY Daily Note    Referring Provider:  Suzan Gregorio    Diagnosis:       ICD-10-CM ICD-9-CM    1. Primary osteoarthritis involving multiple joints M15.0 715.09    2. Chronic left shoulder pain M25.512 719.41     G89.29 338.29             Orders:  Evaluate and Treat    Date of Initial Evaluation: 9-28-17      Visit # 11 of 20    SUBJECTIVE:  Pt. Reports that her B lateral upper arms feel better and she hadn't changed or limited any activities.  Pt. Reports limitations now with donning shirt because of limited B shoulder ROM.    Current Pain:  410, located B upper arms      OBJECTIVE:    TREATMENT PROVIDED:  -Manual Therapy: STM x 10 min. R distal lateral deltoid/ prox. brachialis and L upper trapezius  -Therapeutic Exercise: flexion with wand x 2 min., B abd with wand x 4min., B ext. Rot. With wand x 4 min., B internal rotation stretch x 4 min., scapular retraction x 1 min..   -Modalities: US x 4 min. To L upper trapezius;  US x 4 min. To R Prox. Brachialis/ distal R lateral deltoid        ASSESSMENT:  Pt. Still has moderate muscle tension distal lateral deltoid/ prox. Brachialis even after US and STM.  Moderate muscle tension noted L upper trapezius that was relieved mildly with US and STM.  Manual therapy began again to B shoulders to help increase ROM.  Pt. Tolerates 50% PROM L shoulder flexion and L shoulder abduction.  Pt. Has about 0 degrees L shoulder external rotation.  Pt. Has about 50% L shoulder internal rotation.  R shoulder only mildly limited with flexion and abduction, moderately limited with R external and internal rotation.  Pt.'s HEP updated to advanced shoulder stretches.    PLAN:  Patient will benefit from physical therapy (2) x/week for (6-8) weeks including manual therapy, therapeutic exercise, functional activities, modalities, and patient education.    Thank you for this referral.    These services are reasonable and necessary for the conditions set forth above while under my  care.

## 2017-11-13 ENCOUNTER — CLINICAL SUPPORT (OUTPATIENT)
Dept: REHABILITATION | Facility: HOSPITAL | Age: 82
End: 2017-11-13
Payer: MEDICARE

## 2017-11-13 ENCOUNTER — CLINICAL SUPPORT (OUTPATIENT)
Dept: INTERNAL MEDICINE | Facility: CLINIC | Age: 82
End: 2017-11-13
Payer: MEDICARE

## 2017-11-13 DIAGNOSIS — I10 ESSENTIAL HYPERTENSION: ICD-10-CM

## 2017-11-13 DIAGNOSIS — M85.80 OSTEOPENIA, UNSPECIFIED LOCATION: ICD-10-CM

## 2017-11-13 DIAGNOSIS — M15.9 PRIMARY OSTEOARTHRITIS INVOLVING MULTIPLE JOINTS: Primary | ICD-10-CM

## 2017-11-13 DIAGNOSIS — G89.29 CHRONIC LEFT SHOULDER PAIN: ICD-10-CM

## 2017-11-13 DIAGNOSIS — M25.512 CHRONIC LEFT SHOULDER PAIN: ICD-10-CM

## 2017-11-13 LAB
25(OH)D3+25(OH)D2 SERPL-MCNC: 51 NG/ML
ANION GAP SERPL CALC-SCNC: 8 MMOL/L
BUN SERPL-MCNC: 28 MG/DL
CALCIUM SERPL-MCNC: 9.9 MG/DL
CHLORIDE SERPL-SCNC: 105 MMOL/L
CO2 SERPL-SCNC: 28 MMOL/L
CREAT SERPL-MCNC: 1.3 MG/DL
EST. GFR  (AFRICAN AMERICAN): 43.5 ML/MIN/1.73 M^2
EST. GFR  (NON AFRICAN AMERICAN): 37.8 ML/MIN/1.73 M^2
GLUCOSE SERPL-MCNC: 102 MG/DL
POTASSIUM SERPL-SCNC: 4.4 MMOL/L
SODIUM SERPL-SCNC: 141 MMOL/L

## 2017-11-13 PROCEDURE — 82306 VITAMIN D 25 HYDROXY: CPT

## 2017-11-13 PROCEDURE — 36415 COLL VENOUS BLD VENIPUNCTURE: CPT | Mod: S$GLB,,, | Performed by: FAMILY MEDICINE

## 2017-11-13 PROCEDURE — 80048 BASIC METABOLIC PNL TOTAL CA: CPT

## 2017-11-13 PROCEDURE — 97110 THERAPEUTIC EXERCISES: CPT

## 2017-11-13 PROCEDURE — 97140 MANUAL THERAPY 1/> REGIONS: CPT

## 2017-11-13 PROCEDURE — 99999 PR PBB SHADOW E&M-EST. PATIENT-LVL I: CPT | Mod: PBBFAC,,,

## 2017-11-13 PROCEDURE — 97035 APP MDLTY 1+ULTRASOUND EA 15: CPT

## 2017-11-13 NOTE — PROGRESS NOTES
PHYSICAL THERAPY Daily Note    Referring Provider:  Suzan Gregorio    Diagnosis:       ICD-10-CM ICD-9-CM    1. Primary osteoarthritis involving multiple joints M15.0 715.09    2. Chronic left shoulder pain M25.512 719.41     G89.29 338.29             Orders:  Evaluate and Treat    Date of Initial Evaluation: 9-28-17      Visit # 12 of 20    SUBJECTIVE: Pt. Reports not much discomfort in B lateral upper arms.  Pt. Reports not lots of discomfort in shoulders today even though she pushed her walker a lot this weekend.   Current Pain:  410, located B upper arms      OBJECTIVE:    B Shoulders   Current Pain: 0/10     Worst Pain: 6/10    Internal rotation functional reach - R:L T8: L1    TREATMENT PROVIDED:  -Manual Therapy: STM x 10 min. R distal lateral deltoid/ prox. brachialis  -Therapeutic Exercise: flexion with wand x 2 min., B ext. Rot. With wand x 2 min., scapular retraction x 2 min., isometric: B int. Rot. X 2min., B adduction x 2min., B flexion x 2min., B ext. Rot. X 2min., B abd x 2min.; B D1 flex x 2 min., B D2 flex x 2 min.  -Modalities: US x 4 min. To L posterior shoulder external rotators;  US x 4 min. To R Prox. Brachialis/ distal R lateral deltoid        ASSESSMENT:  Crepitus noted with L shoulder PROM.  Pt. Had pain at end range B flex and L abduction PROM.  L shoulder flexion still 90 degrees and L shoulder abduction still 90 degrees actively or passively.  Pt. Has only mild limitations R shoulder flexion and has Full R shoulder abduction. Internal rotation reach still limited functionally bilaterally.  Pt. Had pain and grinding with shoulder flexion and external rotation stretches.  Pt. Had discomfort with B external rotation isometric strengthening.   Pt. Began D1 and D2 flexion shoulder strengthening exercises to assist with functional activities.  Pt. Only able to perform mild movement along functional strengthening planes of motion.     PLAN:  Patient will benefit from physical therapy (2) x/week  for (6-8) weeks including manual therapy, therapeutic exercise, functional activities, modalities, and patient education.    Thank you for this referral.    These services are reasonable and necessary for the conditions set forth above while under my care.

## 2017-11-20 ENCOUNTER — CLINICAL SUPPORT (OUTPATIENT)
Dept: REHABILITATION | Facility: HOSPITAL | Age: 82
End: 2017-11-20
Payer: MEDICARE

## 2017-11-20 DIAGNOSIS — M15.9 PRIMARY OSTEOARTHRITIS INVOLVING MULTIPLE JOINTS: Primary | ICD-10-CM

## 2017-11-20 DIAGNOSIS — G89.29 CHRONIC LEFT SHOULDER PAIN: ICD-10-CM

## 2017-11-20 DIAGNOSIS — M25.512 CHRONIC LEFT SHOULDER PAIN: ICD-10-CM

## 2017-11-20 PROCEDURE — 97150 GROUP THERAPEUTIC PROCEDURES: CPT

## 2017-11-20 PROCEDURE — 97140 MANUAL THERAPY 1/> REGIONS: CPT

## 2017-11-20 PROCEDURE — 97035 APP MDLTY 1+ULTRASOUND EA 15: CPT

## 2017-11-20 PROCEDURE — 97110 THERAPEUTIC EXERCISES: CPT

## 2017-11-20 NOTE — PROGRESS NOTES
5PHYSICAL THERAPY Daily Note    Referring Provider:  Suzan Gregorio    Diagnosis:       ICD-10-CM ICD-9-CM    1. Primary osteoarthritis involving multiple joints M15.0 715.09    2. Chronic left shoulder pain M25.512 719.41     G89.29 338.29             Orders:  Evaluate and Treat    Date of Initial Evaluation: 9-28-17      Visit # 13 of 20    SUBJECTIVE: Pt. Reports shoulders were doing well until a few days ago while making niya felt pain R shoulder which radiates to L shoulder.       Current Pain:  3/10, located B upper arms      OBJECTIVE:    TREATMENT PROVIDED:  -Manual Therapy: STM x 5 min. R distal lateral deltoid/ prox. Brachialis; B shoulder joint mobilizations A-P, P-A, Inf.-Sup, Sup-Inf. X 8 min. B shoulder passive stretch: flex, abd, int. Rot. Ext. Rot. X 8 min.   -Therapeutic Exercise:  scapular retraction x 1 min., isometric: B int. Rot. X 1min., B adduction x 1min., B flexion x 1min., B ext. Rot. X 1min., B abd x 1min.; B D1 flex x 1 min., B D2 flex x 1 min., shoulder shrugs x 1 min., bicep curls x 1 min.   -Modalities: US x 4 min. To L posterior shoulder external rotators;  US x 4 min. To R Prox. Brachialis/ distal R lateral deltoid        ASSESSMENT:   L shoulder abduction increased passively, L shoulder flexion still 90 degrees.  Pt. Tolerated L shoulder passive external rotation and internal rotation with no crepitus today.  R shoulder PROM only slightly limited from full ROM passively.  Pt. Still has moderate muscle tension R prox. Brachialis muscle.  No tenderness B UT . Pt.     PLAN:  Patient will benefit from physical therapy (2) x/week for (6-8) weeks including manual therapy, therapeutic exercise, functional activities, modalities, and patient education.    Thank you for this referral.    These services are reasonable and necessary for the conditions set forth above while under my care.

## 2017-11-22 ENCOUNTER — OFFICE VISIT (OUTPATIENT)
Dept: INTERNAL MEDICINE | Facility: CLINIC | Age: 82
End: 2017-11-22
Payer: MEDICARE

## 2017-11-22 VITALS
HEART RATE: 62 BPM | TEMPERATURE: 98 F | OXYGEN SATURATION: 95 % | DIASTOLIC BLOOD PRESSURE: 62 MMHG | WEIGHT: 177.25 LBS | BODY MASS INDEX: 30.42 KG/M2 | SYSTOLIC BLOOD PRESSURE: 162 MMHG

## 2017-11-22 DIAGNOSIS — I10 ESSENTIAL HYPERTENSION: Primary | ICD-10-CM

## 2017-11-22 DIAGNOSIS — M25.432 WRIST SWELLING, LEFT: ICD-10-CM

## 2017-11-22 DIAGNOSIS — H04.123 BILATERAL DRY EYES: ICD-10-CM

## 2017-11-22 PROCEDURE — 99214 OFFICE O/P EST MOD 30 MIN: CPT | Mod: S$GLB,,, | Performed by: FAMILY MEDICINE

## 2017-11-22 PROCEDURE — 99499 UNLISTED E&M SERVICE: CPT | Mod: S$GLB,,, | Performed by: FAMILY MEDICINE

## 2017-11-22 PROCEDURE — 99999 PR PBB SHADOW E&M-EST. PATIENT-LVL III: CPT | Mod: PBBFAC,,, | Performed by: FAMILY MEDICINE

## 2017-11-22 RX ORDER — VERAPAMIL HYDROCHLORIDE 120 MG/1
120 CAPSULE, EXTENDED RELEASE ORAL DAILY
Qty: 30 CAPSULE | Refills: 1 | Status: SHIPPED | OUTPATIENT
Start: 2017-11-22 | End: 2017-12-15 | Stop reason: DRUGHIGH

## 2017-11-22 RX ORDER — HYDROCHLOROTHIAZIDE 25 MG/1
25 TABLET ORAL DAILY
COMMUNITY
Start: 2017-10-18 | End: 2018-01-10

## 2017-11-22 NOTE — PROGRESS NOTES
Subjective:       Patient ID: Shirley Metz is a 84 y.o. female.    Chief Complaint: discuss lab results    Here with c/o dry eyes and concern re meds causing this/contributing to this. Her BP checks are varialbe - 128 - 160s/50s-70s.  She has tried taking 1/2 HCTZ - due to too much urination on whole tab. Not doing this daily. Has taken whole tab last few days. No meds today.  Has hx of instrumentation to her left hand thumb. Has increased swelling to this wrist. Is concerned about frequent xrays and worried about local cancer. Does not want xray today.      Review of Systems   Genitourinary: Positive for frequency.   Musculoskeletal: Positive for arthralgias and joint swelling.       Objective:      Physical Exam   Constitutional: She is oriented to person, place, and time. She appears well-developed.   HENT:   Head: Normocephalic and atraumatic.   Cardiovascular: Normal rate, regular rhythm and normal heart sounds.    Pulmonary/Chest: Effort normal and breath sounds normal.   Musculoskeletal:   subcu fatty tissue beneath well healed scar to left volar wrist. Mildly TTP over this area. No pain on ROM wirsts B. NT wrist and thumb joints.   Neurological: She is alert and oriented to person, place, and time.   Skin: Skin is warm and dry.   Psychiatric: She has a normal mood and affect. Her behavior is normal.         Assessment/Plan:     1. Essential hypertension  verapamil (VERELAN) 120 MG C24P   2. Bilateral dry eyes     3. Wrist swelling, left     More than 50% of visit was spent in counseling regarding options, recommendations, medication use, side effects, expectations, and precautions.  Total time spent face-to-face was 25 minutes.  Splint - pt has one - and ice advised for very focal TTP to left wrist. Xray in future per pt preference.  HTN uncontrolled. Will try 1/2 HCTZ 25 mg daily due to c/o urinary freq and eye dryness. Start verapamil 120. Recheck 6 weeks.

## 2017-11-22 NOTE — PATIENT INSTRUCTIONS
Take 1/2 the HCTZ daily. Add verapamil 120 daily in AM.    Continue to take clonidine 0.1mg in PM for now.

## 2017-11-28 PROBLEM — G47.00 INSOMNIA: Status: ACTIVE | Noted: 2017-11-28

## 2017-11-28 PROBLEM — F41.9 ANXIETY: Status: ACTIVE | Noted: 2017-11-28

## 2017-11-28 PROBLEM — I51.89 DIASTOLIC DYSFUNCTION: Status: ACTIVE | Noted: 2017-11-28

## 2017-12-04 ENCOUNTER — OFFICE VISIT (OUTPATIENT)
Dept: INTERNAL MEDICINE | Facility: CLINIC | Age: 82
End: 2017-12-04
Payer: MEDICARE

## 2017-12-04 VITALS
HEART RATE: 65 BPM | HEIGHT: 64 IN | BODY MASS INDEX: 30.04 KG/M2 | OXYGEN SATURATION: 98 % | WEIGHT: 175.94 LBS | SYSTOLIC BLOOD PRESSURE: 128 MMHG | DIASTOLIC BLOOD PRESSURE: 86 MMHG

## 2017-12-04 DIAGNOSIS — M85.80 OSTEOPENIA, UNSPECIFIED LOCATION: Chronic | ICD-10-CM

## 2017-12-04 DIAGNOSIS — H40.1134 PRIMARY OPEN ANGLE GLAUCOMA OF BOTH EYES, INDETERMINATE STAGE: ICD-10-CM

## 2017-12-04 DIAGNOSIS — E66.9 OBESITY (BMI 30.0-34.9): ICD-10-CM

## 2017-12-04 DIAGNOSIS — F41.9 ANXIETY: ICD-10-CM

## 2017-12-04 DIAGNOSIS — I10 ESSENTIAL HYPERTENSION: Chronic | ICD-10-CM

## 2017-12-04 DIAGNOSIS — M19.90 OSTEOARTHRITIS, UNSPECIFIED OSTEOARTHRITIS TYPE, UNSPECIFIED SITE: ICD-10-CM

## 2017-12-04 DIAGNOSIS — N18.30 CKD (CHRONIC KIDNEY DISEASE) STAGE 3, GFR 30-59 ML/MIN: ICD-10-CM

## 2017-12-04 DIAGNOSIS — G47.00 INSOMNIA, UNSPECIFIED TYPE: ICD-10-CM

## 2017-12-04 DIAGNOSIS — H35.30 MACULAR DEGENERATION: ICD-10-CM

## 2017-12-04 DIAGNOSIS — R73.03 PRE-DIABETES: ICD-10-CM

## 2017-12-04 DIAGNOSIS — E78.5 HYPERLIPIDEMIA, UNSPECIFIED HYPERLIPIDEMIA TYPE: Chronic | ICD-10-CM

## 2017-12-04 DIAGNOSIS — Z00.00 ENCOUNTER FOR PREVENTIVE HEALTH EXAMINATION: Primary | ICD-10-CM

## 2017-12-04 DIAGNOSIS — Z94.7 HISTORY OF CORNEA TRANSPLANT: ICD-10-CM

## 2017-12-04 DIAGNOSIS — I51.89 DIASTOLIC DYSFUNCTION: ICD-10-CM

## 2017-12-04 DIAGNOSIS — I70.0 CALCIFICATION OF AORTA: Chronic | ICD-10-CM

## 2017-12-04 PROBLEM — E66.811 OBESITY (BMI 30.0-34.9): Status: ACTIVE | Noted: 2017-12-04

## 2017-12-04 PROCEDURE — G0439 PPPS, SUBSEQ VISIT: HCPCS | Mod: S$GLB,,, | Performed by: NURSE PRACTITIONER

## 2017-12-04 PROCEDURE — 99499 UNLISTED E&M SERVICE: CPT | Mod: S$GLB,,, | Performed by: NURSE PRACTITIONER

## 2017-12-04 PROCEDURE — 99999 PR PBB SHADOW E&M-EST. PATIENT-LVL IV: CPT | Mod: PBBFAC,,, | Performed by: NURSE PRACTITIONER

## 2017-12-04 NOTE — PATIENT INSTRUCTIONS
"  Counseling and Referral of Other Preventative  (Italic type indicates deductible and co-insurance are waived)    Patient Name: Shirley Metz  Today's Date: 12/4/2017      SERVICE LIMITATIONS RECOMMENDATION    Vaccines    · Pneumococcal (once after 65)    · Influenza (annually)    · Hepatitis B (if medium/high risk)    · Prevnar 13      Hepatitis B medium/high risk factors:       - End-stage renal disease       - Hemophiliacs who received Factor VII or         IX concentrates       - Clients of institutions for the mentally             retarded       - Persons who live in the same house as          a HepB carrier       - Homosexual men       - Illicit injectable drug abusers     Pneumococcal: Reports had a pneumonia vaccine in past but unsure type or date.   She declines any further pneumonia vaccines.      Influenza: Recommended to patient, declined     Hepatitis B: N/A     Prevnar 13: Recommended to patient, declined    Mammogram (biennial age 50-74)  Annually (age 40 or over)  Mammogram 8/29/2016, report recommended 1 year repeat.   She is outside of recommended age for continued screening. Advised to discuss continued screening with PCP.     Pap (up to age 70 and after 70 if unknown history or abnormal study last 10 years)    Denies a history of abnormal pap smear.   She is due to follow up with GYN.   Recommended follow up for annual well woman exam; she declines scheduling at this time.     Colorectal cancer screening (to age 75)    · Fecal occult blood test (annual)  · Flexible sigmoidoscopy (5y)  · Screening colonoscopy (10y)  · Barium enema   Denies a family history of colon cancer.   History of colon polyps.   Colonoscopy 11/26/2013, per report "...- Repeat colonoscopy is not recommended for screening purposes...."    Diabetes self-management training (no USPSTF recommendations)  Requires referral by treating physician for patient with diabetes or renal disease. 10 hours of initial DSMT sessions of " no less than 30 minutes each in a continuous 12-month period. 2 hours of follow-up DSMT in subsequent years. Patient is not a diabetic.     Bone mass measurements (age 65 & older, biennial)  Requires diagnosis related to osteoporosis or estrogen deficiency. Biennial benefit unless patient has history of long-term glucocorticoid  DEXA 8/7/2017, report recommended to repeat in 2 years.     Glaucoma screening (no USPSTF recommendation)  Diabetes mellitus, family history   , age 50 or over    American, age 65 or over  Encouraged yearly eye exams.    Eye exam 11/2/2017    Medical nutrition therapy for diabetes or renal disease (no recommended schedule)  Requires referral by treating physician for patient with diabetes or renal disease or kidney transplant within the past 3 years.  Can be provided in same year as diabetes self-management training (DSMT), and CMS recommends medical nutrition therapy take place after DSMT. Up to 3 hours for initial year and 2 hours in subsequent years.  advised to discuss scheduling with PCP.     Cardiovascular screening blood tests (every 5 years)  · Fasting lipid panel  Order as a panel if possible Lipid 1/4/2017, repeat 1 year.     Diabetes screening tests (at least every 3 years, Medicare covers annually or at 6-month intervals for prediabetic patients)  · Fasting blood sugar (FBS) or glucose tolerance test (GTT)  Patient must be diagnosed with one of the following:       - Hypertension       - Dyslipidemia       - Obesity (BMI 30kg/m2)       - Previous elevated impaired FBS or GTT       ... or any two of the following:       - Overweight (BMI 25 but <30)       - Family history of diabetes       - Age 65 or older       - History of gestational diabetes or birth of baby weighing more than 9 pounds A1C 6.3 on 5/14/2017; advised to follow up with PCP.     HIV screening (annually for increased risk patients)  · HIV-1 and HIV-2 by EIA, or JAY, rapid antibody test or  oral mucosa transudate  Patients must be at increased risk for HIV infection per USPSTF guidelines or pregnant. Tests covered annually for patient at increased risk or as requested by the patient. Pregnant patients may receive up to 3 tests during pregnancy.  Risks discussed, screening is not recommended    Smoking cessation counseling (up to 8 sessions per year)  Patients must be asymptomatic of tobacco-related conditions to receive as a preventative service.  Non-smoker    Subsequent annual wellness visit  At least 12 months since last AWV  Return in one year     The following information is provided to all patients.  This information is to help you find resources for any of the problems found today that may be affecting your health:                Living healthy guide: www.[facility].[place].[facility]      Understanding Diabetes: www.diabetes.org      Eating healthy: www.cdc.gov/healthyweight      CDC home safety checklist: www.cdc.gov/steadi/patient.html      Agency on Aging: www.goea.[place].[facility]      Alcoholics anonymous (AA): www.aa.org      Physical Activity: www.andrew.nih.gov/qz1lgtk      Tobacco use: www.quitwithusla.org

## 2017-12-04 NOTE — PROGRESS NOTES
PHYSICAL THERAPY Re-Evaluation    Referring Provider:  Suzan Gregorio    Diagnosis:       ICD-10-CM ICD-9-CM    1. Primary osteoarthritis involving multiple joints M15.0 715.09    2. Chronic left shoulder pain M25.512 719.41     G89.29 338.29             Orders:  Evaluate and Treat    Date of Initial Evaluation: 9-28-17      Visit # 14 of 20    SUBJECTIVE:  Pt. Has had pain the past week in upper arms limiting her from coming to therapy.  Pt. Reports that her L upper arm is hurting more than her right and the pain is causing her to wake up at night.  Pt. Reports that heat /cold pack is not working.  Pt. Reports that arnica natural homeopathic is the only thing that works to decrease her pain.  Pt. Reports difficulty with upper body dressing.    Current Pain: L  6/10, 4/10 R UE  Worst Pain: 10/10      OBJECTIVE:    Sensation:  B UE intact              10-26-17   12-5-17          Evaluation    Reeval  Re-Eval         R  L   R  L   R  L  Shoulder A/PROM: Flexion     113  93   123  120  120P!  97P!     Extension   57  40   WnL  WNL  WNL P! WNL     Abduction   130  100   115  105  130click 97         Adduction   30  15   20  35  30 P!  35     Internal Rotation  63  59   45  65  65  WNL Neutral     External Rotation  70  63 'click' per pt. 85  63  70  30 grinding Neutral                10-26-17   12-5-17          Evaluation   Re-Eval   Re-Eval         R  L   R  L  R  L  Strength:  Supraspinatus   1+/5  1+/5   1+/5  1+/5  1+/5  1+/5     Subscapularis   1+/5  1+/5   1+/5  1+/5  1+/5  1+/5     Latissimus dorsi   4/5  1/5   4/5  1/5  3+/5  1+/5     Rhomboids   4/5  4/5   4/5  4/5  3+/5  4/5     Upper Trapezius  4/5  4/5   4/5  4/5  5/5  5/5     Middle Trapezius  4/5  4/5   4/5  4/5  3+/5  3+/5     Lower Trapezius  4/5  4/5   4/5  4/5  3+/5  1+/5       Levator scapula  4/5  4/5   4/5  4/5  4/5  4/5     Biceps    4/5  3+/5   4+/5  4+/5  4/5  4/5     Triceps   4/5  3+/5   4/5  4/5  4/5  4/5     shld  flexors   1+/5  1+/5   1+/5  1+/5  1+/5  1+/5     Extensors   4/5  4/5   5/5  5/5  3+/5  4/5     Abductors   1+/5  1+/5   1+/5  1+/5  1+/5  1+/5           R  L   R  L  R  L  Special test:  NEER     -  +   -  -  -  -    Red tarango   -  +   -  +  -  -    Drop arm    -  +   -  -  -  -    Empty can    -  -   -  -  -  -      Int. Rot. Functional Reach     T6  S1   T7  T12  T7  L1    GHJ mobility: Initial Eval-crepitus noted with L external and L internal rotation      Tenderness to palpation:  L upper trapezius, L lateral suprapinatus fossa; Re-Eval 12-5-17 no tenderness         Function: Patient reports 39% disability based on score of the Upper Extremity Functional Scale on eval 10-2-17    Patient reports 71% disability based on score of the Upper Extremity Functional Scale on eval 12-5-17    Functional Limitations and Goal:  This patient's primary Physical Therapy goal is to return to their prior level of function (Carryiing G-8984) without limitations.  The patient's current level of impairment is 60-80  percent impaired (CL) based on their score of 71 percent impaired on the Quick DASH Shoulder Questionnaire.  The patient is expected to achieve a score of 1 to 19 percent impaired ( G-8985  CI ) within 10 treatments.        ASSESSMENT: Pt. Has been limited with progress since last re-eval.  Pt. Wanted to work more on ROM again and more manual tx restarted to increase shoulder ROM and continued US and STM to biceps to decrease muscle pain.  Many treatments patient would forego exercise or report that exercise increases her pain. Patient does make jewelry and crafts that she feels may cause her to overuse her muscles.  Since last reassessment, R shoulder AROM increased grossly except external rotation and   L shoulder AROM grossly decreased.  B UE strength decreased grossly. More ROM noted passively than actively without much complaint of pain from patient.  Pt. Reports that she will go to doctor sometimes this  week secondary increased pain in shoulder.  Pt. Asked for more exercises to help strengthen her arms and pt. Given HEP with shoulder strengthening therex with band and weights for all shoulder planes of motion.  Pt. Did not want to work on exercises in clinic.  Pt. Needs to be more consistent with performing exercises in therapy and with HEP to help increase strength.        Short Term Goals:  1-4 weeks  1. I with HEP MET  2. 75% R Shoulder AROM Not Met  3. Decrease pain B shoulders to less than 4/10 Not Met Consistently     Long Term Goals: 6-8 weeks  1. Return to activities with pain less than 2/10 Not Met  2.Pt. To have full R shoulder AROM to don/ doff shirt with ease Not Met  3. Increase R shoulder strength to 5/5 to lift groceries and moderate size objects. Not Met  4. Pt. To have 75% L shoulder AROM to lift objects from overhead cabinets. Not Met  4. Increase L shoulder functional internal rotation reach to T6 to bathe opposite side of body. Not Met    TREATMENT PROVIDED:  -Manual Therapy: N/A  -Therapeutic Exercise:  N/A  -Modalities: N/A  -Re-Evaluation completed      PLAN:  Patient will benefit from physical therapy (1) x/week for (6-8) weeks including manual therapy, therapeutic exercise, functional activities, modalities, and patient education.    Thank you for this referral.    These services are reasonable and necessary for the conditions set forth above while under my care.

## 2017-12-04 NOTE — Clinical Note
Your patient was seen today for a HRA visit. Abnormalities have been identified during this visit that may require additional testing and  follow up. I have included a copy of my visit note, please review the note and feel free to contact me with any questions.  Thank you for allowing me to participate in the care of your patients.  Minna Floyd NP

## 2017-12-04 NOTE — PROGRESS NOTES
"Shirley Metz presented for a  Medicare AWV and comprehensive Health Risk Assessment today. The following components were reviewed and updated:    · Medical history  · Family History  · Social history  · Allergies and Current Medications  · Health Risk Assessment  · Health Maintenance  · Care Team     ** See Completed Assessments for Annual Wellness Visit within the encounter summary.**       The following assessments were completed:  · Living Situation  · CAGE  · Depression Screening  · Timed Get Up and Go  · Whisper Test  · Cognitive Function Screening  · Nutrition Screening  · ADL Screening  · PAQ Screening    Vitals:    12/04/17 0848   BP: 128/86   BP Location: Left arm   Patient Position: Sitting   BP Method: Medium (Manual)   Pulse: 65   SpO2: 98%   Weight: 79.8 kg (175 lb 14.8 oz)   Height: 5' 4" (1.626 m)     Body mass index is 30.2 kg/m².  Physical Exam   Constitutional: She is oriented to person, place, and time. She appears well-developed and well-nourished.   HENT:   Head: Normocephalic.   Cardiovascular: Normal rate, regular rhythm and normal heart sounds.    No murmur heard.  Ectopic beats noted.    Pulmonary/Chest: Effort normal and breath sounds normal. No respiratory distress.   Abdominal: Soft. She exhibits no mass. There is no tenderness.   Musculoskeletal: Normal range of motion. She exhibits no edema.   Neurological: She is alert and oriented to person, place, and time. She exhibits normal muscle tone.   Skin: Skin is warm, dry and intact.   Psychiatric: She has a normal mood and affect. Her speech is normal and behavior is normal.   Nursing note and vitals reviewed.        Diagnoses and health risks identified today and associated recommendations/orders:    1. Encounter for preventive health examination    2. Diastolic dysfunction  Echo 5/14/2017---CONCLUSIONS     1 - Concentric hypertrophy.     2 - No wall motion abnormalities.     3 - Normal left ventricular systolic function (EF " 60-65%).     4 - Impaired LV relaxation, elevated LAP (grade 2 diastolic dysfunction).     5 - Normal right ventricular systolic function .     6 - Pulmonary hypertension. The estimated PA systolic pressure is 45 mmHg.     7 - Mild mitral regurgitation.     8 - Trivial to mild tricuspid regurgitation.     9 - Intermediate central venous pressure.   Continue current treatment plan as previously prescribed with cardiology, PIETER Metz.   She declined making follow up appointment with cardiology at this time.   Advised to follow up with PCP. She expressed understanding.        3. Essential hypertension  Stable and controlled. Continue current treatment plan as previously prescribed with your PCP.     4. Hyperlipidemia, unspecified hyperlipidemia type  Not at goal.   Per patient intolerant to statins.   Discussed dietary changes.   Continue current treatment plan as previously prescribed with your PCP.     5. Calcification of aorta  CT Chest 6/21/2011---THE THORACIC AORTA IS CALCIFIED WITHOUT ANEURYSMAL DILATATION.  Discussed diagnosis and risk reduction. She expressed understanding.     Stable. Continue current treatment plan as previously prescribed with your PCP.      6. Pre-diabetes  A1C 6.3, increased prior check.   Advised to follow up with PCP for further evaluation and recommendations. She expressed understanding.      7. CKD (chronic kidney disease) stage 3, GFR 30-59 ml/min  Discussed diagnosis. Advised to avoid NSAIDs.   Has seen nephrology in past; she declines follow up appointment.   Stable. Continue current treatment plan as previously prescribed with your PCP.     8. Insomnia, unspecified type  Per patient nightly Xanax helps with insomnia.   Stable. Continue current treatment plan as previously prescribed with your PCP.     9. Anxiety  Per patient not controlled on Xanax.   Has seen  in past; declines follow up visit.   Advised to follow up with PCP for further evaluation and treatment. She  expressed understanding.      10. Osteopenia, unspecified location  DEXA 8/7/2017  Continue current treatment plan as previously prescribed with your PCP.      11. Osteoarthritis, unspecified osteoarthritis type, unspecified site  Per patient stable.   Stable and controlled. Continue current treatment plan as previously prescribed with your PCP and rheumatology, JERARDO Adames.     12. Obesity (BMI 30.0-34.9)  Encouraged healthy diet and exercise as tolerated to help bring BMI into normal range.   Continue current treatment plan as previously prescribed with your PCP.     13. Primary open angle glaucoma of both eyes, indeterminate stage  Continue current treatment plan as previously prescribed with your eye doctor, Dr. Ware.     14. Macular degeneration  Continue current treatment plan as previously prescribed with your eye doctor, Dr. Ware.     15. History of cornea transplant  Per patient left 2015; right 2016  Continue current treatment plan as previously prescribed with your eye doctors, Dr. Ware and Dr. Quintanilla.     16. Ectopic beats noted on PE.   Denies chest pains, SOB, or palpitations.  Denies lightheadedness, dizziness, near syncope, or syncope.    Advised to follow up with PCP for further evaluation and recommendations. She expressed understanding.      17. Reports she occasionally (maybe twice a month) has pain in the distal esophagus after swallowing. Reports she will burp and pain will resolve. Ongoing for about 3-4 months. Denies dysphagia, ILIANA, heartburn, or epigastric pain.   Advised to follow up with PCP for further evaluation and treatment. She expressed understanding.      Reports she is in physical therapy for arm pain.     Provided Shirley with a 5-10 year written screening schedule and personal prevention plan. Recommendations were developed using the USPSTF age appropriate recommendations. Education, counseling, and referrals were provided as needed. After Visit Summary printed and given to  patient which includes a list of additional screenings\tests needed.    Return in about 1 year (around 12/4/2018) for HRA.    Minna Floyd, NP

## 2017-12-05 ENCOUNTER — CLINICAL SUPPORT (OUTPATIENT)
Dept: REHABILITATION | Facility: HOSPITAL | Age: 82
End: 2017-12-05
Payer: MEDICARE

## 2017-12-05 DIAGNOSIS — M25.512 CHRONIC LEFT SHOULDER PAIN: ICD-10-CM

## 2017-12-05 DIAGNOSIS — G89.29 CHRONIC LEFT SHOULDER PAIN: ICD-10-CM

## 2017-12-05 DIAGNOSIS — M15.9 PRIMARY OSTEOARTHRITIS INVOLVING MULTIPLE JOINTS: Primary | ICD-10-CM

## 2017-12-05 PROCEDURE — 97164 PT RE-EVAL EST PLAN CARE: CPT

## 2017-12-05 PROCEDURE — G8984 CARRY CURRENT STATUS: HCPCS | Mod: CL

## 2017-12-05 PROCEDURE — G8985 CARRY GOAL STATUS: HCPCS | Mod: CH

## 2017-12-10 ENCOUNTER — PATIENT MESSAGE (OUTPATIENT)
Dept: INTERNAL MEDICINE | Facility: CLINIC | Age: 82
End: 2017-12-10

## 2017-12-12 ENCOUNTER — TELEPHONE (OUTPATIENT)
Dept: INTERNAL MEDICINE | Facility: CLINIC | Age: 82
End: 2017-12-12

## 2017-12-12 NOTE — TELEPHONE ENCOUNTER
Patient calling in regards to scheduling a sooner appointment that her scheduled one in January for her HBP. Scheduled the pt for 12/15/17 at 11 am. Pt verbalized understanding of the information given.

## 2017-12-12 NOTE — TELEPHONE ENCOUNTER
----- Message from Melodie Shaffer sent at 12/12/2017 12:23 PM CST -----  Contact: Pt  Please give pt a call at ..[phone] (home) she was waiting on a call back from Nurse Elaine.

## 2017-12-15 ENCOUNTER — OFFICE VISIT (OUTPATIENT)
Dept: INTERNAL MEDICINE | Facility: CLINIC | Age: 82
End: 2017-12-15
Payer: MEDICARE

## 2017-12-15 ENCOUNTER — TELEPHONE (OUTPATIENT)
Dept: INTERNAL MEDICINE | Facility: CLINIC | Age: 82
End: 2017-12-15

## 2017-12-15 VITALS
TEMPERATURE: 98 F | OXYGEN SATURATION: 95 % | HEART RATE: 64 BPM | BODY MASS INDEX: 30.66 KG/M2 | DIASTOLIC BLOOD PRESSURE: 68 MMHG | SYSTOLIC BLOOD PRESSURE: 172 MMHG | HEIGHT: 64 IN | WEIGHT: 179.56 LBS

## 2017-12-15 DIAGNOSIS — I10 ESSENTIAL HYPERTENSION: Primary | ICD-10-CM

## 2017-12-15 DIAGNOSIS — R13.19 ESOPHAGEAL DYSPHAGIA: ICD-10-CM

## 2017-12-15 DIAGNOSIS — R63.0 DECREASED APPETITE: ICD-10-CM

## 2017-12-15 LAB
ALBUMIN SERPL BCP-MCNC: 3.8 G/DL
ALP SERPL-CCNC: 66 U/L
ALT SERPL W/O P-5'-P-CCNC: 20 U/L
AST SERPL-CCNC: 22 U/L
BASOPHILS # BLD AUTO: 0.05 K/UL
BASOPHILS NFR BLD: 0.7 %
BILIRUB DIRECT SERPL-MCNC: 0.2 MG/DL
BILIRUB SERPL-MCNC: 0.4 MG/DL
DIFFERENTIAL METHOD: ABNORMAL
EOSINOPHIL # BLD AUTO: 0.1 K/UL
EOSINOPHIL NFR BLD: 1.8 %
ERYTHROCYTE [DISTWIDTH] IN BLOOD BY AUTOMATED COUNT: 13.2 %
HCT VFR BLD AUTO: 35.3 %
HGB BLD-MCNC: 11.6 G/DL
IMM GRANULOCYTES # BLD AUTO: 0.02 K/UL
IMM GRANULOCYTES NFR BLD AUTO: 0.3 %
LYMPHOCYTES # BLD AUTO: 1.9 K/UL
LYMPHOCYTES NFR BLD: 26.4 %
MCH RBC QN AUTO: 31.4 PG
MCHC RBC AUTO-ENTMCNC: 32.9 G/DL
MCV RBC AUTO: 96 FL
MONOCYTES # BLD AUTO: 0.7 K/UL
MONOCYTES NFR BLD: 9.9 %
NEUTROPHILS # BLD AUTO: 4.4 K/UL
NEUTROPHILS NFR BLD: 60.9 %
NRBC BLD-RTO: 0 /100 WBC
PLATELET # BLD AUTO: 232 K/UL
PMV BLD AUTO: 10.5 FL
PROT SERPL-MCNC: 7.4 G/DL
RBC # BLD AUTO: 3.69 M/UL
WBC # BLD AUTO: 7.2 K/UL

## 2017-12-15 PROCEDURE — 99214 OFFICE O/P EST MOD 30 MIN: CPT | Mod: S$GLB,,, | Performed by: FAMILY MEDICINE

## 2017-12-15 PROCEDURE — 85025 COMPLETE CBC W/AUTO DIFF WBC: CPT

## 2017-12-15 PROCEDURE — 99999 PR PBB SHADOW E&M-EST. PATIENT-LVL IV: CPT | Mod: PBBFAC,,, | Performed by: FAMILY MEDICINE

## 2017-12-15 PROCEDURE — 99499 UNLISTED E&M SERVICE: CPT | Mod: S$GLB,,, | Performed by: FAMILY MEDICINE

## 2017-12-15 PROCEDURE — 80076 HEPATIC FUNCTION PANEL: CPT

## 2017-12-15 RX ORDER — VERAPAMIL HYDROCHLORIDE 120 MG/1
120 CAPSULE, EXTENDED RELEASE ORAL 2 TIMES DAILY
Qty: 60 CAPSULE | Refills: 1 | Status: SHIPPED | OUTPATIENT
Start: 2017-12-15 | End: 2018-01-24 | Stop reason: DRUGHIGH

## 2017-12-15 NOTE — TELEPHONE ENCOUNTER
----- Message from Milagros Tran sent at 12/15/2017 11:05 AM CST -----  Contact: [phone]  11:05am-Pt running 15 mins- Called no answer- Jaime

## 2017-12-15 NOTE — PROGRESS NOTES
Subjective:       Patient ID: Shirley Metz is a 85 y.o. female.    Chief Complaint: Hypertension; Hernia; and heartbeat    She is here early for recheck on hypertension.  She had a recent HRA evaluation and was concerned about some of the items that were identified.  She has abdominal obesity and is concerned that she might have a possible herniation to the left lower quadrant.  At her last visit it was recommended that she take one half the hydrochlorothiazide and start her on verapamil.  She took verapamil didn't like numbers and stopped taking it after a week.  Her numbers continue to be very wide ranging.  However, after she started taking the verapamil her numbers looked pretty good with nothing higher than 160.  Her first dose of verapamil was 11/25/17.  Then on 12/7 she started having higher numbers with the 170s to 180s and even up to 190 systolic blood pressure; she decided it must be due to the verapamil and stopped taking it.  Her numbers continued high; diastolic blood pressures however have remained well controlled.  At 4 AM today her number was 182/74 it was 143/60 at 11 PM and 201/72 at 8 p.m.    She also notes she has had some episodes of food hanging up or feeling that it gets caught in the upper chest region.      Review of Systems   HENT: Positive for trouble swallowing.    Respiratory: Negative for shortness of breath.    Cardiovascular: Negative for chest pain and leg swelling.   Gastrointestinal: Positive for abdominal distention and constipation (miralax daily controls it). Negative for abdominal pain and nausea.   Genitourinary: Positive for frequency. Negative for dysuria.       Objective:      Physical Exam   Constitutional: She is oriented to person, place, and time. She appears well-developed and well-nourished.   HENT:   Head: Normocephalic and atraumatic.   Right Ear: External ear normal.   Left Ear: External ear normal.   Mouth/Throat: Oropharynx is clear and moist. No  oropharyngeal exudate.   Neck: Normal range of motion. Neck supple.   Cardiovascular: Normal rate, regular rhythm and normal heart sounds.    Pulmonary/Chest: Effort normal and breath sounds normal.   Abdominal: Soft. Bowel sounds are normal. She exhibits no distension and no mass. There is no tenderness. There is no rebound and no guarding. No hernia.   Obese abdomen   Neurological: She is alert and oriented to person, place, and time.   Skin: Skin is warm and dry.   Psychiatric: She has a normal mood and affect. Her behavior is normal.         Assessment/Plan:     1. Essential hypertension  verapamil (VERELAN) 120 MG C24P    CBC auto differential   2. Esophageal dysphagia     3. Decreased appetite  Hepatic function panel    CBC auto differential   increase to BID verapamil - pt does not want tablet, will ocntinue on the capsule but take it bid.  See patient instructions.  Recheck in 4 weeks.  Abdominal obesity - decreased appetite  - run LFTs. Consider abdominal imaging. She declines referral to GI at this time for swallowing evaluation. Also declines UGI/barium swallow at this time.  Coffee with coffee mate, pancake

## 2017-12-15 NOTE — PATIENT INSTRUCTIONS
Et back on the verapamil 120 daily for one week - then increase to twice daily.  After two weeks on two daily, if BPs are not controlled, you may add a third verapamil 120.

## 2017-12-19 ENCOUNTER — TELEPHONE (OUTPATIENT)
Dept: OPHTHALMOLOGY | Facility: CLINIC | Age: 82
End: 2017-12-19

## 2017-12-19 NOTE — TELEPHONE ENCOUNTER
----- Message from Yael Rao MA sent at 12/19/2017  1:16 PM CST -----  Contact: Pt  Pt would like to talk to a nurse re: broken blood vessel in the eye.  Please call the Pt#[phone].    Thank you,  Yael

## 2017-12-19 NOTE — TELEPHONE ENCOUNTER
Spoke to patient she has had a broken blood vessel in her right eye, she has no changes in vision or pain and has seen PCP who is treating her for increase in her BP.  I instructed patient if she has any pain or changes in vision, to call and we will get her in to see a doctor.  Explained that it will be like a bruise and may get worse before better and take upward of 10 days to improve

## 2017-12-20 ENCOUNTER — TELEPHONE (OUTPATIENT)
Dept: INTERNAL MEDICINE | Facility: CLINIC | Age: 82
End: 2017-12-20

## 2017-12-20 NOTE — TELEPHONE ENCOUNTER
----- Message from Lilia Paez sent at 12/20/2017  8:58 AM CST -----  Contact: Donnell[facility]  Donnell called and stated he has been trying for a few days to get a hold of someone regarding the Verelan; he has a question about the change.    [facility] - [place] - [address]  [address]  [place] [id number]  Phone: [phone] Fax: [phone]    Mary,  Lilia

## 2017-12-20 NOTE — TELEPHONE ENCOUNTER
I spoke with pharmacist.  We are going to keep her on the same medication as rxd, taken twice a day as she wants a capsule formula.

## 2017-12-20 NOTE — TELEPHONE ENCOUNTER
Spoke with Donnell from [facility]. He wanted to verify the pt Rx for the Verapamil 120 mg. He states that this medication it to be taken only once a day. He states that there is a 240 mg dosage that can be taken once per day and there is other different types of this medication that can be taken two times per day.     Please Advise

## 2017-12-20 NOTE — TELEPHONE ENCOUNTER
----- Message from Abril Mccrary sent at 12/20/2017  9:56 AM CST -----  Contact: Patient   Patient request a call back  at [phone], Regards to her results.    Thanks  td

## 2018-01-03 ENCOUNTER — CLINICAL SUPPORT (OUTPATIENT)
Dept: REHABILITATION | Facility: HOSPITAL | Age: 83
End: 2018-01-03
Payer: MEDICARE

## 2018-01-03 DIAGNOSIS — M25.512 CHRONIC LEFT SHOULDER PAIN: ICD-10-CM

## 2018-01-03 DIAGNOSIS — G89.29 CHRONIC LEFT SHOULDER PAIN: ICD-10-CM

## 2018-01-03 DIAGNOSIS — M15.9 PRIMARY OSTEOARTHRITIS INVOLVING MULTIPLE JOINTS: Primary | ICD-10-CM

## 2018-01-03 PROCEDURE — G8985 CARRY GOAL STATUS: HCPCS | Mod: CI

## 2018-01-03 PROCEDURE — G8984 CARRY CURRENT STATUS: HCPCS | Mod: CK

## 2018-01-03 PROCEDURE — 97164 PT RE-EVAL EST PLAN CARE: CPT

## 2018-01-03 PROCEDURE — 97140 MANUAL THERAPY 1/> REGIONS: CPT

## 2018-01-03 NOTE — PROGRESS NOTES
PHYSICAL THERAPY Re-Evaluation    Referring Provider:  Suzan Gregorio    Diagnosis:       ICD-10-CM ICD-9-CM    1. Primary osteoarthritis involving multiple joints M15.0 715.09    2. Chronic left shoulder pain M25.512 719.41     G89.29 338.29             Orders:  Evaluate and Treat    Date of Initial Evaluation: 9-28-17      Visit # 15 of 20    SUBJECTIVE:  Pt. Reports no longer having pain in lower upper arms but moved more proximally and greater in L arm than R arm.   Pt. feels that she has more movement in shoulders now. Pt. Still reports that pain in shoulders wakes her up at night  but shecan lie on sides.  Pt. Reports performing stretches at home but still has pain trying to perform strengthening therex.      Current Pain: 4/10 B UE      OBJECTIVE:    Sensation:  B UE intact              10-26-17   12-5-17    1-3-17          Evaluation    Reeval  Re-Eval    Re-Eval         R  L   R  L   R  L   R  L    Shoulder A/PROM: Flexion     113  93   123  120  120P!  97P!   122  110     Extension   57  40   WnL  WNL  WNL P! WNL   WNL  WNL     Abduction   130  100   115  105  130click 97   140  120      Adduction-(hor.)  30  15   20  35  30 P!  35   12             10     Internal Rotation  63  59   45  65  65  WNL Neutral  45-ABD 72     External Rotation  70  63 'click' per pt. 85  63  70  30 grinding Neutral 95  45                10-26-17   12-5-17   1-3-17          Evaluation   Re-Eval   Re-Eval   Re-Eval         R  L   R  L  R  L  R L  Strength:  Supraspinatus   1+/5  1+/5   1+/5  1+/5  1+/5  1+/5  1+/5    1+/5     Subscapularis   1+/5  1+/5   1+/5  1+/5  1+/5  1+/5  3+/5    1+/5     Latissimus dorsi   4/5  1/5   4/5  1/5  3+/5  1+/5  3+/5    1+/5     Rhomboids   4/5  4/5   4/5  4/5  3+/5  4/5  4/5    4/5     Upper Trapezius  4/5  4/5   4/5  4/5  5/5  5/5  5/5    5/5     Middle Trapezius  4/5 4/5 4/5 4/5  3+/5  3+/5  3+/5    3+/5     Lower Trapezius  4/5 4/5 4/5 4/5  3+/5  1+/5  3+/5    1+/5     Levator  scapula  4/5  4/5   4/5  4/5  4/5  4/5  4/5     4/5     Biceps    4/5  3+/5   4+/5  4+/5  4/5  4/5  5/5           5/5     Triceps   4/5  3+/5   4/5  4/5  4/5  4/5  5/5     5/5     shld flexors   1+/5  1+/5   1+/5  1+/5  1+/5  1+/5  3+/5     3+/5     Extensors   4/5  4/5   5/5  5/5  3+/5  4/5  4/5     4/5     Abductors   1+/5  1+/5   1+/5  1+/5  1+/5  1+/5  1+/5    1+/5           R  L   R  L  R  L  R L  Special test:  NEER     -  +   -  -  -  -  -    -    Red tarango   -  +   -  +  -  -  -    -    Drop arm    -  +   -  -  -  -  -    -    Empty can    -  -   -  -  -  -  -    -      Int. Rot. Functional Reach     T6  S1   T7  T12  T7  L1  T6    T10    GHJ mobility: Initial Eval-crepitus noted with L external and L internal rotation      Tenderness to palpation:  L upper trapezius, L lateral suprapinatus fossa; Re-Eval 12-5-17 no tenderness         Function: Patient reports 39% disability based on score of the Upper Extremity Functional Scale on eval 10-2-17    Patient reports 71% disability based on score of the Upper Extremity Functional Scale on eval 12-5-17    Patient reports 47% disability based on score of the Upper Extremity Functional Scale on eval 12-5-17        Functional Limitations and Goal:  This patient's primary Physical Therapy goal is to return to their prior level of function (Carryiing G-8984) without limitations.  The patient's current level of impairment is 40-60  percent impaired (CK) based on their score of 47 percent impaired on the Quick DASH Shoulder Questionnaire.  The patient is expected to achieve a score of 1 to 19 percent impaired ( G-8985  CI ) within 10 treatments.      ASSESSMENT: Pt. Showed progress with skilled PT since last re-eval.  Pt. Has improved AROM in B shoulders grossly except horizontal adduction bilaterally and R internal rotation.  Pt. Strength has improved in bilat. Biceps, triceps, shld flexors and shld extensors.   Pt. Still has crepitus with L shoulder external  rotation.  Pt. Still reports that pain occurs with strengthening exercises.  Pt. HEP updated with shoulder adduction stretches to help increase AROM bilaterally. Pt. Has moderate muscle tension L lateral deltoids that resolved with US and STM and mild muscle tension R lateral deltoids.  Moderate muscle tension still located R proximal brachialis but pain no longer occurs in that area.   Cont. skilled PT secondary pt. Still having difficulty with upper body dressing at L UE and reach maximum ROM able to be achieved for pt.        Short Term Goals:  1-4 weeks  1. I with HEP MET  2. 75% R Shoulder AROM Not Met  3. Decrease pain B shoulders to less than 4/10 Not Met Consistently     Long Term Goals: 6-8 weeks  1. Return to activities with pain less than 2/10 Not Met  2.Pt. To have full R shoulder AROM to don/ doff shirt with ease Not Met  3. Increase R shoulder strength to 5/5 to lift groceries and moderate size objects. Not Met  4. Pt. To have 75% L shoulder AROM to lift objects from overhead cabinets. Not Met  4. Increase L shoulder functional internal rotation reach to T6 to bathe opposite side of body. Not Met    TREATMENT PROVIDED:  -Manual Therapy: STM x 10 min. To B lateral deltoids and R proximal brachialis  -Therapeutic Exercise:  N/A  -Modalities: US x 8 min. To bilat. Lateral deltoids (4 min. Each)  -Re-Evaluation completed      PLAN:  Patient will benefit from physical therapy (1) x/week for (6-8) weeks including manual therapy, therapeutic exercise, functional activities, modalities, and patient education.    Thank you for this referral.    These services are reasonable and necessary for the conditions set forth above while under my care.

## 2018-01-10 ENCOUNTER — OFFICE VISIT (OUTPATIENT)
Dept: CARDIOLOGY | Facility: CLINIC | Age: 83
End: 2018-01-10
Payer: MEDICARE

## 2018-01-10 ENCOUNTER — LAB VISIT (OUTPATIENT)
Dept: LAB | Facility: HOSPITAL | Age: 83
End: 2018-01-10
Attending: NURSE PRACTITIONER
Payer: MEDICARE

## 2018-01-10 VITALS
HEART RATE: 62 BPM | WEIGHT: 177.94 LBS | BODY MASS INDEX: 30.38 KG/M2 | HEIGHT: 64 IN | DIASTOLIC BLOOD PRESSURE: 64 MMHG | SYSTOLIC BLOOD PRESSURE: 150 MMHG

## 2018-01-10 DIAGNOSIS — R35.0 FREQUENCY OF URINATION: ICD-10-CM

## 2018-01-10 DIAGNOSIS — I11.9 HYPERTENSIVE LEFT VENTRICULAR HYPERTROPHY, WITHOUT HEART FAILURE: Chronic | ICD-10-CM

## 2018-01-10 DIAGNOSIS — I10 ESSENTIAL HYPERTENSION: Primary | Chronic | ICD-10-CM

## 2018-01-10 DIAGNOSIS — I51.89 DIASTOLIC DYSFUNCTION: ICD-10-CM

## 2018-01-10 DIAGNOSIS — E78.5 HYPERLIPIDEMIA, UNSPECIFIED HYPERLIPIDEMIA TYPE: Chronic | ICD-10-CM

## 2018-01-10 DIAGNOSIS — N18.30 CHRONIC KIDNEY DISEASE, STAGE 3: ICD-10-CM

## 2018-01-10 LAB
BILIRUB UR QL STRIP: NEGATIVE
CLARITY UR REFRACT.AUTO: CLEAR
COLOR UR AUTO: ABNORMAL
GLUCOSE UR QL STRIP: NEGATIVE
HGB UR QL STRIP: NEGATIVE
KETONES UR QL STRIP: NEGATIVE
LEUKOCYTE ESTERASE UR QL STRIP: ABNORMAL
MICROSCOPIC COMMENT: NORMAL
NITRITE UR QL STRIP: NEGATIVE
PH UR STRIP: 7 [PH] (ref 5–8)
PROT UR QL STRIP: NEGATIVE
RBC #/AREA URNS AUTO: 1 /HPF (ref 0–4)
SP GR UR STRIP: 1.01 (ref 1–1.03)
SQUAMOUS #/AREA URNS AUTO: 1 /HPF
URN SPEC COLLECT METH UR: ABNORMAL
UROBILINOGEN UR STRIP-ACNC: NEGATIVE EU/DL
WBC #/AREA URNS AUTO: 3 /HPF (ref 0–5)

## 2018-01-10 PROCEDURE — 99214 OFFICE O/P EST MOD 30 MIN: CPT | Mod: S$GLB,,, | Performed by: NURSE PRACTITIONER

## 2018-01-10 PROCEDURE — 99999 PR PBB SHADOW E&M-EST. PATIENT-LVL III: CPT | Mod: PBBFAC,,, | Performed by: NURSE PRACTITIONER

## 2018-01-10 PROCEDURE — 99499 UNLISTED E&M SERVICE: CPT | Mod: S$GLB,,, | Performed by: NURSE PRACTITIONER

## 2018-01-10 PROCEDURE — 81001 URINALYSIS AUTO W/SCOPE: CPT

## 2018-01-10 NOTE — PROGRESS NOTES
Subjective:   Patient ID:  Shirley Metz is a 85 y.o. female who presents for follow up of Hypertension      HPI    Patient presents to clinic for follow up and management of HTN. Patient denies any chest pain . Has been a little SOB. Has been having significant bilaterally shoulder pain. Does PT and following closely with Rheumatologist.   Patient has had issues with BP control in the past. Is very sensitive to medical therapy in the past. Gets myalgias with almost every medicine that she has tried in the past. Her Baseline BP usually runs 140's-150's. Recently started on Verapamil 120mg BID by PCP for elevated BP readings. She  States that her BP has been running 150's systolic with taking Verapamil once daily. Still taking clonidine in the evening. Patient states that she hasn't taken her HCTZ in a few weeks. Complains of frequency. No hematuria, dysuria, fevers or chills.  No chest pain, no edema, orthopnea, PND, dizziness, near syncope. Has chronic SOB. Limiting sodium intake.     Past Medical History:   Diagnosis Date    Anxiety 11/28/2017    Arthritis     Back pain     Colon polyps     reported per patient.     Fuchs' corneal dystrophy     Glaucoma     Hyperlipidemia     Hypertension     Macular degeneration dry    Obesity     Trouble in sleeping     Urinary tract infection        Past Surgical History:   Procedure Laterality Date    ADENOIDECTOMY  1938    BREAST CYST EXCISION Left 1997 approx    CARPAL TUNNEL RELEASE Right 2012 approx    CATARACT EXTRACTION Bilateral 1994    CHOLECYSTECTOMY  1975    CORNEAL TRANSPLANT Bilateral 08/2015 8/2015 - left; Right 4/2016    EYE SURGERY      Fuch's Corneal dystrophy - had 2nd operation with Dr. Quintanilla    EYE SURGERY      Cataract wIOL    left thumb Left 2011    removed cuboid for arthritis    SLT- OS (aka TRABECULOPLASTY) Left 05/11/2011    repeat 3/14/12    TONSILLECTOMY  1938       Social History   Substance Use Topics     Smoking status: Never Smoker    Smokeless tobacco: Never Used      Comment: history of passive smoking from her ex-    Alcohol use 1.2 oz/week     2 Standard drinks or equivalent per week      Comment: occasional        Family History   Problem Relation Age of Onset    Heart disease Mother     Hypertension Mother     Cancer Father 88     sarcoma( ?Kaposi's ) on leg    Deep vein thrombosis Neg Hx     Ovarian cancer Neg Hx     Breast cancer Neg Hx     Kidney disease Neg Hx     Strabismus Neg Hx     Retinal detachment Neg Hx     Macular degeneration Neg Hx     Glaucoma Neg Hx     Blindness Neg Hx     Amblyopia Neg Hx     Eczema Neg Hx     Lupus Neg Hx     Melanoma Neg Hx     Psoriasis Neg Hx     Diabetes Neg Hx     Stroke Neg Hx     Mental retardation Neg Hx     Mental illness Neg Hx     Hyperlipidemia Neg Hx     COPD Neg Hx     Asthma Neg Hx     Depression Neg Hx     Alcohol abuse Neg Hx     Drug abuse Neg Hx        Current Outpatient Prescriptions   Medication Sig    acetaminophen (TYLENOL) 500 MG tablet Take 500 mg by mouth once daily at 6am. Taking 1 to 3    ALPHA LIPOIC ACID ORAL Take 200 mg by mouth.     alprazolam (XANAX) 0.5 MG tablet 1/2 to 1 tablet nightly by mouth for sleep or anxiety    CALCIUM CITRATE ORAL Take by mouth.    cholecalciferol, vitamin D3, (VITAMIN D3) 2,000 unit Cap Take 1 capsule by mouth once daily.    cloNIDine (CATAPRES) 0.1 MG tablet Take 1 tablet (0.1 mg total) by mouth 2 (two) times daily as needed (BP greater than 160/90). (Patient taking differently: Take 0.1 mg by mouth 2 (two) times daily as needed (BP greater than 160/90  takes one at 7 pm and one at 11 pm). 7/19/17 taking one nightly per the pt)    coenzyme Q10 200 mg capsule Take 400 mg by mouth once daily.     fish oil-omega-3 fatty acids 300-1,000 mg capsule Take 2 g by mouth once daily.    losartan (COZAAR) 50 MG tablet Take 1 tablet (50 mg total) by mouth 2 (two) times daily.     loteprednol (LOTEMAX) 0.5 % ophthalmic suspension 1 drop once daily. One drop in the left eye and 1 drops in the right eye    metoprolol succinate (TOPROL-XL) 25 MG 24 hr tablet Take 1 tablet (25 mg total) by mouth 2 (two) times daily.    POLYETHYLENE GLYCOL 3350 (MIRALAX ORAL) Take by mouth as needed.     pyridoxine, vitamin B6, (B-6) 100 MG Tab Take 100 mg by mouth 2 (two) times daily.     travoprost, benzalkonium, (TRAVATAN) 0.004 % ophthalmic solution Place 1 drop into the left eye every evening.    TURMERIC ROOT EXTRACT ORAL Take 665 mg by mouth.     verapamil (VERELAN) 120 MG C24P Take 1 capsule (120 mg total) by mouth 2 (two) times daily. (Patient taking differently: Take 120 mg by mouth once daily. )    hydroCHLOROthiazide (HYDRODIURIL) 25 MG tablet Take 25 mg by mouth once daily. As needed     No current facility-administered medications for this visit.      Current Outpatient Prescriptions on File Prior to Visit   Medication Sig    acetaminophen (TYLENOL) 500 MG tablet Take 500 mg by mouth once daily at 6am. Taking 1 to 3    ALPHA LIPOIC ACID ORAL Take 200 mg by mouth.     alprazolam (XANAX) 0.5 MG tablet 1/2 to 1 tablet nightly by mouth for sleep or anxiety    CALCIUM CITRATE ORAL Take by mouth.    cholecalciferol, vitamin D3, (VITAMIN D3) 2,000 unit Cap Take 1 capsule by mouth once daily.    cloNIDine (CATAPRES) 0.1 MG tablet Take 1 tablet (0.1 mg total) by mouth 2 (two) times daily as needed (BP greater than 160/90). (Patient taking differently: Take 0.1 mg by mouth 2 (two) times daily as needed (BP greater than 160/90  takes one at 7 pm and one at 11 pm). 7/19/17 taking one nightly per the pt)    coenzyme Q10 200 mg capsule Take 400 mg by mouth once daily.     fish oil-omega-3 fatty acids 300-1,000 mg capsule Take 2 g by mouth once daily.    losartan (COZAAR) 50 MG tablet Take 1 tablet (50 mg total) by mouth 2 (two) times daily.    loteprednol (LOTEMAX) 0.5 % ophthalmic suspension 1  drop once daily. One drop in the left eye and 1 drops in the right eye    metoprolol succinate (TOPROL-XL) 25 MG 24 hr tablet Take 1 tablet (25 mg total) by mouth 2 (two) times daily.    POLYETHYLENE GLYCOL 3350 (MIRALAX ORAL) Take by mouth as needed.     pyridoxine, vitamin B6, (B-6) 100 MG Tab Take 100 mg by mouth 2 (two) times daily.     travoprost, benzalkonium, (TRAVATAN) 0.004 % ophthalmic solution Place 1 drop into the left eye every evening.    TURMERIC ROOT EXTRACT ORAL Take 665 mg by mouth.     verapamil (VERELAN) 120 MG C24P Take 1 capsule (120 mg total) by mouth 2 (two) times daily. (Patient taking differently: Take 120 mg by mouth once daily. )    hydroCHLOROthiazide (HYDRODIURIL) 25 MG tablet Take 25 mg by mouth once daily. As needed     No current facility-administered medications on file prior to visit.        Review of Systems   Constitution: Negative for decreased appetite, weakness, malaise/fatigue, weight gain and weight loss.   HENT: Negative for nosebleeds.    Cardiovascular: Negative for chest pain, claudication, dyspnea on exertion, irregular heartbeat, leg swelling, near-syncope, orthopnea, palpitations, paroxysmal nocturnal dyspnea and syncope.   Respiratory: Negative for cough, shortness of breath, sleep disturbances due to breathing, snoring and wheezing.    Hematologic/Lymphatic: Negative for bleeding problem. Does not bruise/bleed easily.   Skin: Negative for rash.   Musculoskeletal: Positive for arthritis and myalgias. Negative for back pain, falls, joint pain, joint swelling, muscle cramps and muscle weakness.   Gastrointestinal: Negative for bloating, abdominal pain, constipation, diarrhea, heartburn, nausea and vomiting.   Genitourinary: Negative for dysuria, hematuria and nocturia.   Neurological: Negative for excessive daytime sleepiness, dizziness, light-headedness, loss of balance, numbness, paresthesias and vertigo.   Psychiatric/Behavioral: The patient has insomnia.   "      Objective:   Physical Exam   Constitutional: She is oriented to person, place, and time. She appears well-developed and well-nourished.   Neck: Neck supple. No JVD present.   Cardiovascular: Normal rate, regular rhythm, normal heart sounds and normal pulses.  Exam reveals no friction rub.    No murmur heard.  Pulmonary/Chest: Effort normal and breath sounds normal. No respiratory distress. She has no wheezes. She has no rales.   Abdominal: Soft. Bowel sounds are normal. She exhibits no distension.   Musculoskeletal: She exhibits no edema or tenderness.   Neurological: She is alert and oriented to person, place, and time.   Skin: Skin is warm and dry. No rash noted.   Psychiatric: She has a normal mood and affect. Her behavior is normal.   Nursing note and vitals reviewed.    Vitals:    01/10/18 0925   BP: (!) 150/64   BP Location: Left arm   Patient Position: Sitting   BP Method: Medium (Manual)   Pulse: 62   Weight: 80.7 kg (177 lb 14.6 oz)   Height: 5' 4" (1.626 m)     Lab Results   Component Value Date    CHOL 258 (H) 01/04/2017    CHOL 252 (H) 06/13/2016    CHOL 255 (H) 11/02/2015     Lab Results   Component Value Date    HDL 44 01/04/2017    HDL 42 06/13/2016    HDL 47 11/02/2015     Lab Results   Component Value Date    LDLCALC 177.2 (H) 01/04/2017    LDLCALC 152.4 06/13/2016    LDLCALC 160.0 (H) 11/02/2015     Lab Results   Component Value Date    TRIG 184 (H) 01/04/2017    TRIG 288 (H) 06/13/2016    TRIG 240 (H) 11/02/2015     Lab Results   Component Value Date    CHOLHDL 17.1 (L) 01/04/2017    CHOLHDL 16.7 (L) 06/13/2016    CHOLHDL 18.4 (L) 11/02/2015       Chemistry        Component Value Date/Time     11/13/2017 0935    K 4.4 11/13/2017 0935     11/13/2017 0935    CO2 28 11/13/2017 0935    BUN 28 (H) 11/13/2017 0935    CREATININE 1.3 11/13/2017 0935     11/13/2017 0935        Component Value Date/Time    CALCIUM 9.9 11/13/2017 0935    ALKPHOS 66 12/15/2017 1427    AST 22 " 12/15/2017 1427    ALT 20 12/15/2017 1427    BILITOT 0.4 12/15/2017 1427    ESTGFRAFRICA 43.5 (A) 11/13/2017 0935    EGFRNONAA 37.8 (A) 11/13/2017 0935          Lab Results   Component Value Date    TSH 1.871 01/04/2017     Lab Results   Component Value Date    INR 1.0 01/01/2017    INR 1.0 02/09/2016    INR 1.0 01/22/2015     Lab Results   Component Value Date    WBC 7.20 12/15/2017    HGB 11.6 (L) 12/15/2017    HCT 35.3 (L) 12/15/2017    MCV 96 12/15/2017     12/15/2017     BMP  Sodium   Date Value Ref Range Status   11/13/2017 141 136 - 145 mmol/L Final     Potassium   Date Value Ref Range Status   11/13/2017 4.4 3.5 - 5.1 mmol/L Final     Chloride   Date Value Ref Range Status   11/13/2017 105 95 - 110 mmol/L Final     CO2   Date Value Ref Range Status   11/13/2017 28 23 - 29 mmol/L Final     BUN, Bld   Date Value Ref Range Status   11/13/2017 28 (H) 8 - 23 mg/dL Final     Creatinine   Date Value Ref Range Status   11/13/2017 1.3 0.5 - 1.4 mg/dL Final     Calcium   Date Value Ref Range Status   11/13/2017 9.9 8.7 - 10.5 mg/dL Final     Anion Gap   Date Value Ref Range Status   11/13/2017 8 8 - 16 mmol/L Final     eGFR if    Date Value Ref Range Status   11/13/2017 43.5 (A) >60 mL/min/1.73 m^2 Final     eGFR if non    Date Value Ref Range Status   11/13/2017 37.8 (A) >60 mL/min/1.73 m^2 Final     Comment:     Calculation used to obtain the estimated glomerular filtration  rate (eGFR) is the CKD-EPI equation.        CrCl cannot be calculated (Patient's most recent lab result is older than the maximum 7 days allowed.).    Assessment:     1. Essential hypertension    2. Hyperlipidemia, unspecified hyperlipidemia type    3. Hypertensive left ventricular hypertrophy, without heart failure    4. Diastolic dysfunction    5. Chronic kidney disease, stage 3    BP looks ok given her baseline. She is having bilateral shoulder pain in which he is seeing her Rheumatologist.   She has  multiple drug allergies and has been very sensitive to meds. She is tolerating current regimen  No CNS complaints to suggest TIA or CVA  No evidence of CHF on exam  Has high suspicion for ROBINSON, but was unable to complete study due to her not being able to fall asleep      Plan:   Will continue current medical management for now. She would like to continue her Verapamil at 120mg daily and use her clonidine 0.1mg PRN. She would like to see how her BP balances out before making any further changes. She has been very sensitive to medication titration in the past. Can increase Verapamil to 180mg once daily if BP needs better control  Heart healthy diet   Keep BP log  RTC in 3 months or sooner if needed

## 2018-01-11 ENCOUNTER — TELEPHONE (OUTPATIENT)
Dept: CARDIOLOGY | Facility: CLINIC | Age: 83
End: 2018-01-11

## 2018-01-11 NOTE — TELEPHONE ENCOUNTER
Please inform patient that her UA looks ok. She can see her PCP to work up other possible causes of frequency

## 2018-01-11 NOTE — TELEPHONE ENCOUNTER
I have attempted without success to contact this patient by phone no answer left message for pt to call back.

## 2018-01-13 DIAGNOSIS — G47.00 INSOMNIA, UNSPECIFIED TYPE: ICD-10-CM

## 2018-01-13 DIAGNOSIS — F41.9 ANXIETY: ICD-10-CM

## 2018-01-15 RX ORDER — ALPRAZOLAM 0.5 MG/1
TABLET ORAL
Qty: 30 TABLET | Refills: 2 | Status: SHIPPED | OUTPATIENT
Start: 2018-01-15 | End: 2018-06-13 | Stop reason: SDUPTHER

## 2018-01-18 NOTE — PROGRESS NOTES
PHYSICAL THERAPY Daily Note    Referring Provider:  Suzan Gregorio    Diagnosis:       ICD-10-CM ICD-9-CM    1. Primary osteoarthritis involving multiple joints M15.0 715.09    2. Chronic left shoulder pain M25.512 719.41     G89.29 338.29             Orders:  Evaluate and Treat    Date of Initial Evaluation: 9-28-17      Visit # 16 of 20    SUBJECTIVE: Pt. Reports using self- applying kinesiotape on shoulders which helps but tape began to irritate her today and she felt her BP increasing and doffed tape.  Pt. Reports that her pain in her shoulders and upper arms calmed down after last visit but came back and has been fluctuating.  Pt. Reports that ifting or carrying doesn't bother her shoulders or arms but moving fingers and hands bothers upper arms.  Pt. Reports good ROM in L shoulder until last 2-3 days.       Current Pain: 4/10 B UE      OBJECTIVE:    /70    TREATMENT PROVIDED:  -Manual Therapy: STM x 12 min. To B lateral deltoids and B proximal brachialis  -Therapeutic Exercise:  N/A  -Modalities: US x 12 min. To bilat. Lateral deltoids (4 min. Each), L posterior shoulder joint    -Kinesiotape applied L proximal brachialis    ASSESSMENT: Pt.'s BP elevated secondary pain per pt.  Pt. Reports that she only takes her BP med if her systolic is above 175.  Muscle tension visible and palpable B lateral deltoid and B prox. Brachialis regions.  After US and e-stim, pt. Less muscle tension noted. Pt. Moderately limited with L shoulder AROM with pain at end ranges.  US to posterior shoulder to help increase ROM.  Kinesiotape applied to L prox. Brachialis with 25% stretch  To help decrease pain.  Pt. Educated to doff kinesiotape 2-3 days or immediately if irritation occurs.  Pt. Reports that she will followup with doctor next week to see if further PT is recommended.        PLAN:  Patient will benefit from physical therapy (1) x/week for (6-8) weeks including manual therapy, therapeutic exercise, functional  activities, modalities, and patient education.    Thank you for this referral.    These services are reasonable and necessary for the conditions set forth above while under my care.

## 2018-01-19 ENCOUNTER — CLINICAL SUPPORT (OUTPATIENT)
Dept: REHABILITATION | Facility: HOSPITAL | Age: 83
End: 2018-01-19
Payer: MEDICARE

## 2018-01-19 DIAGNOSIS — M25.512 CHRONIC LEFT SHOULDER PAIN: ICD-10-CM

## 2018-01-19 DIAGNOSIS — G89.29 CHRONIC LEFT SHOULDER PAIN: ICD-10-CM

## 2018-01-19 DIAGNOSIS — M15.9 PRIMARY OSTEOARTHRITIS INVOLVING MULTIPLE JOINTS: Primary | ICD-10-CM

## 2018-01-19 PROCEDURE — 97035 APP MDLTY 1+ULTRASOUND EA 15: CPT

## 2018-01-19 PROCEDURE — 97140 MANUAL THERAPY 1/> REGIONS: CPT

## 2018-01-19 NOTE — PROGRESS NOTES
Subjective:       Patient ID: Shirley Metz is a 85 y.o. female.    Chief Complaint: osteoarthritis    Shirley Rg is seen for osteoarthritis.  She has multiple joint pain.  She has been in PT ([facility] [address]) for her left shoulder previously, she does home exercises which help.  Muscle weakness started with statin therapy years ago, has never been back to normal.  She is very sensitive to multiple medications including nsaids, prednisone. She avoids steroid shots.  She does take tylenol prn and takes tumeric daily. She uses castor oil for her joints. She really likes to stay natural and avoid adding new medications.  She does not want cortisone injections. Has had bad experience with steroids in the past.   She uses KT strips (otc tape) which helps her joints.      Today she is here with increased pain in her shoulders and arms. Pain 6/10  She has been taking tylenol but hasn't helped.  Her pain is mainly at night and mainly in her left shoulder. Her motion is limited, pain wakes her from sleep. Goes into her arm and stops around her elbow. She has been having issues with high blood pressure.  Her pcp and cardiologist told her to see us.  She is on several medications for her BP and keeping logs of her BP.  This is worrying her.       Review of Systems   Constitutional: Negative for chills, fatigue and fever.   HENT: Negative for mouth sores, rhinorrhea and sore throat.    Eyes: Negative for pain and redness.        Glaucoma   Respiratory: Negative for cough and shortness of breath.    Cardiovascular: Negative for chest pain.   Gastrointestinal: Negative for abdominal pain, constipation, diarrhea, nausea and vomiting.   Genitourinary: Negative for dysuria and hematuria.   Musculoskeletal: Positive for arthralgias and myalgias. Negative for joint swelling.        Left wrist trapeziectomy   Skin: Negative for rash.   Neurological: Negative for weakness, numbness and headaches.  "  Psychiatric/Behavioral: The patient is not nervous/anxious.          Objective:   BP (!) 177/76   Pulse 68   Ht 5' 4" (1.626 m)   Wt 83.1 kg (183 lb 3.2 oz)   BMI 31.45 kg/m²      Physical Exam   Constitutional: She is oriented to person, place, and time and well-developed, well-nourished, and in no distress.   HENT:   Head: Normocephalic and atraumatic.   Eyes: Pupils are equal, round, and reactive to light. Right eye exhibits no discharge.   Neck: Normal range of motion.   Cardiovascular: Normal rate, regular rhythm and normal heart sounds.  Exam reveals no friction rub.    Pulmonary/Chest: Effort normal and breath sounds normal. No respiratory distress.   Abdominal: Soft. She exhibits no distension. There is no tenderness.   Lymphadenopathy:     She has no cervical adenopathy.   Neurological: She is alert and oriented to person, place, and time.   Skin: No rash noted. No erythema.     Psychiatric: Mood normal.   Musculoskeletal: Normal range of motion. She exhibits no edema, tenderness or deformity.   Mild oa changes to bli wrists, pip and dips, no synovitis;  Left wrist volar surface with some soft tissue bulging at the radial aspect (prior surgery, trapeziectomy)  Left shoulder ff about 130 degress, + impingement sign  Right shoulder full rom  donna knees no effusion, full rom           Assessment:       1. Chronic left shoulder pain    2. Tendinitis of left rotator cuff    3. Primary osteoarthritis involving multiple joints    4. Myalgia    5. Uncontrolled hypertension    6. Chronic kidney disease, stage 3        Impression:  OA multiple joints including donna hands, left shoulder: reactions to multiple meds in the past, avoids nsaids, topical nsaids, prednisone, steroid shots; does take tumeric, uses castor oil, KT strips otc    Myalgia: generalized, h/o rxn to statin with residual weakness     Chronic left shoulder pain: with acute exacerbation today, due to djd, +impingment, rotator cuff " tendonitis    Sensitivity to multiple medications: long list of med allergies, doesn't like to take much    CKD: avoid nsaids    Uncontrolled htn: working with pcp and cards, told to see us?; some elevation could be due to pain but certainly a systolic of 200 plus is not due to her left shoulder pain, medication needs to be adjusted.     Plan:       Would like to inject with steroid but she is declining this,   Recommend going back to PT and focus on shoulder, maybe dry needling may help?  We are really limited with options as she doesn't take many medications and will not get injections  rec fish oil/ omega 3 for joints, cont tumeric  Ok to take tylenol   Considered limbrel in the past but currently off the market  Discussed that her blood pressure issues need to be addressed by pcp, cards and that her significant elevation can not be attributed all to her shoulder pain.       See back prn

## 2018-01-22 ENCOUNTER — OFFICE VISIT (OUTPATIENT)
Dept: RHEUMATOLOGY | Facility: CLINIC | Age: 83
End: 2018-01-22
Payer: MEDICARE

## 2018-01-22 VITALS
WEIGHT: 183.19 LBS | HEART RATE: 68 BPM | BODY MASS INDEX: 31.27 KG/M2 | HEIGHT: 64 IN | DIASTOLIC BLOOD PRESSURE: 76 MMHG | SYSTOLIC BLOOD PRESSURE: 177 MMHG

## 2018-01-22 DIAGNOSIS — M79.10 MYALGIA: ICD-10-CM

## 2018-01-22 DIAGNOSIS — M75.82 TENDINITIS OF LEFT ROTATOR CUFF: ICD-10-CM

## 2018-01-22 DIAGNOSIS — G89.29 CHRONIC LEFT SHOULDER PAIN: Primary | ICD-10-CM

## 2018-01-22 DIAGNOSIS — M25.512 CHRONIC LEFT SHOULDER PAIN: Primary | ICD-10-CM

## 2018-01-22 DIAGNOSIS — M15.9 PRIMARY OSTEOARTHRITIS INVOLVING MULTIPLE JOINTS: ICD-10-CM

## 2018-01-22 DIAGNOSIS — I10 UNCONTROLLED HYPERTENSION: ICD-10-CM

## 2018-01-22 DIAGNOSIS — N18.30 CHRONIC KIDNEY DISEASE, STAGE 3: ICD-10-CM

## 2018-01-22 PROCEDURE — 99999 PR PBB SHADOW E&M-EST. PATIENT-LVL V: CPT | Mod: PBBFAC,,, | Performed by: PHYSICIAN ASSISTANT

## 2018-01-22 PROCEDURE — 99214 OFFICE O/P EST MOD 30 MIN: CPT | Mod: S$GLB,,, | Performed by: PHYSICIAN ASSISTANT

## 2018-01-22 PROCEDURE — 99499 UNLISTED E&M SERVICE: CPT | Mod: S$GLB,,, | Performed by: PHYSICIAN ASSISTANT

## 2018-01-22 NOTE — PATIENT INSTRUCTIONS
Ask about dry needling, if they think that would help     Back to PT     Can try increasing tumeric

## 2018-01-24 ENCOUNTER — TELEPHONE (OUTPATIENT)
Dept: INTERNAL MEDICINE | Facility: CLINIC | Age: 83
End: 2018-01-24

## 2018-01-24 ENCOUNTER — PATIENT MESSAGE (OUTPATIENT)
Dept: INTERNAL MEDICINE | Facility: CLINIC | Age: 83
End: 2018-01-24

## 2018-01-24 DIAGNOSIS — I10 ESSENTIAL HYPERTENSION: Primary | ICD-10-CM

## 2018-01-24 RX ORDER — VERAPAMIL HYDROCHLORIDE 180 MG/1
180 CAPSULE, EXTENDED RELEASE ORAL DAILY
Qty: 30 CAPSULE | Refills: 1 | Status: SHIPPED | OUTPATIENT
Start: 2018-01-24 | End: 2018-02-12 | Stop reason: SINTOL

## 2018-01-24 NOTE — TELEPHONE ENCOUNTER
----- Message from Maddi Mccrary sent at 1/24/2018 12:18 PM CST -----  Contact: pt   Call pt regarding the e-mail that she received on [facility].  Pt states that she cant see the messages and need to know what the doctor want her to do.   [phone]

## 2018-01-24 NOTE — TELEPHONE ENCOUNTER
I spoke with pt - she had problems going to verapamil 120 twice a day.  Per cardiology's suggestion, I suggested we increase to 180 verapamil daily.  She is agreeable.  Rx is sent to her pharmacy.    She has scheduled appointment with nephrology.  She has an appointment with PT for Monday.    For future, I would like her to consider a low dose of duloxetine 20 mg daily.  She is currently taking one half the 0.5 Xanax nightly.  She is currently taking clonidine 0.1 nightly at around 7 PM and a second dose at midnight when she wakes up.  She states that in the morning she ends up taking a half dose because of high blood pressure.  Patient is advised that we would like to get her on regular medication schedule to control her blood pressure, instead of her PRN dosing.

## 2018-01-25 ENCOUNTER — HOSPITAL ENCOUNTER (EMERGENCY)
Facility: HOSPITAL | Age: 83
Discharge: HOME | End: 2018-01-25
Attending: EMERGENCY MEDICINE
Payer: MEDICARE

## 2018-01-25 VITALS
OXYGEN SATURATION: 95 % | BODY MASS INDEX: 30.65 KG/M2 | SYSTOLIC BLOOD PRESSURE: 153 MMHG | RESPIRATION RATE: 24 BRPM | WEIGHT: 178.56 LBS | TEMPERATURE: 98 F | DIASTOLIC BLOOD PRESSURE: 67 MMHG | HEART RATE: 60 BPM

## 2018-01-25 DIAGNOSIS — M25.512 CHRONIC LEFT SHOULDER PAIN: ICD-10-CM

## 2018-01-25 DIAGNOSIS — R06.00 DYSPNEA, UNSPECIFIED TYPE: ICD-10-CM

## 2018-01-25 DIAGNOSIS — I10 ESSENTIAL HYPERTENSION: Primary | ICD-10-CM

## 2018-01-25 DIAGNOSIS — G89.29 CHRONIC LEFT SHOULDER PAIN: ICD-10-CM

## 2018-01-25 LAB
ALBUMIN SERPL BCP-MCNC: 3.8 G/DL
ALP SERPL-CCNC: 71 U/L
ALT SERPL W/O P-5'-P-CCNC: 25 U/L
ANION GAP SERPL CALC-SCNC: 12 MMOL/L
APTT BLDCRRT: 23.4 SEC
AST SERPL-CCNC: 23 U/L
BASOPHILS # BLD AUTO: 0.02 K/UL
BASOPHILS NFR BLD: 0.3 %
BILIRUB SERPL-MCNC: 0.8 MG/DL
BNP SERPL-MCNC: 487 PG/ML
BUN SERPL-MCNC: 24 MG/DL
CALCIUM SERPL-MCNC: 9.9 MG/DL
CHLORIDE SERPL-SCNC: 105 MMOL/L
CO2 SERPL-SCNC: 24 MMOL/L
CREAT SERPL-MCNC: 1.2 MG/DL
DIFFERENTIAL METHOD: ABNORMAL
EOSINOPHIL # BLD AUTO: 0.1 K/UL
EOSINOPHIL NFR BLD: 1.3 %
ERYTHROCYTE [DISTWIDTH] IN BLOOD BY AUTOMATED COUNT: 13.8 %
EST. GFR  (AFRICAN AMERICAN): 48 ML/MIN/1.73 M^2
EST. GFR  (NON AFRICAN AMERICAN): 41 ML/MIN/1.73 M^2
GLUCOSE SERPL-MCNC: 99 MG/DL
HCT VFR BLD AUTO: 35.8 %
HGB BLD-MCNC: 11.8 G/DL
INR PPP: 1
LYMPHOCYTES # BLD AUTO: 1.5 K/UL
LYMPHOCYTES NFR BLD: 20.4 %
MCH RBC QN AUTO: 31.3 PG
MCHC RBC AUTO-ENTMCNC: 33 G/DL
MCV RBC AUTO: 95 FL
MONOCYTES # BLD AUTO: 0.7 K/UL
MONOCYTES NFR BLD: 9.9 %
NEUTROPHILS # BLD AUTO: 5.1 K/UL
NEUTROPHILS NFR BLD: 68.1 %
PLATELET # BLD AUTO: 199 K/UL
PMV BLD AUTO: 9.5 FL
POTASSIUM SERPL-SCNC: 4 MMOL/L
PROT SERPL-MCNC: 7.1 G/DL
PROTHROMBIN TIME: 10.6 SEC
RBC # BLD AUTO: 3.77 M/UL
SODIUM SERPL-SCNC: 141 MMOL/L
TROPONIN I SERPL DL<=0.01 NG/ML-MCNC: 0.02 NG/ML
WBC # BLD AUTO: 7.45 K/UL

## 2018-01-25 PROCEDURE — 80053 COMPREHEN METABOLIC PANEL: CPT

## 2018-01-25 PROCEDURE — 36415 COLL VENOUS BLD VENIPUNCTURE: CPT

## 2018-01-25 PROCEDURE — 93010 ELECTROCARDIOGRAM REPORT: CPT | Mod: ,,, | Performed by: INTERNAL MEDICINE

## 2018-01-25 PROCEDURE — 93005 ELECTROCARDIOGRAM TRACING: CPT

## 2018-01-25 PROCEDURE — 85610 PROTHROMBIN TIME: CPT

## 2018-01-25 PROCEDURE — 85730 THROMBOPLASTIN TIME PARTIAL: CPT

## 2018-01-25 PROCEDURE — 85025 COMPLETE CBC W/AUTO DIFF WBC: CPT

## 2018-01-25 PROCEDURE — 83880 ASSAY OF NATRIURETIC PEPTIDE: CPT

## 2018-01-25 PROCEDURE — 84484 ASSAY OF TROPONIN QUANT: CPT

## 2018-01-25 PROCEDURE — 99284 EMERGENCY DEPT VISIT MOD MDM: CPT | Mod: 25

## 2018-01-25 NOTE — ED PROVIDER NOTES
SCRIBE #1 NOTE: I, Bryson Feliz, am scribing for, and in the presence of, Trang Tobar MD. I have scribed the entire note.      History      Chief Complaint   Patient presents with    Shortness of Breath     shortness of breath and nausea       Review of patient's allergies indicates:   Allergen Reactions    Claritin [loratadine] Other (See Comments)     Double vision/spots    Hydrocodone-acetaminophen      wheezing    Naproxen      wheezing    Vibramycin [doxycycline calcium] Other (See Comments)     Weakness nausea   sob    Amlodipine      Myalgias      Coreg [carvedilol] Swelling     lips    Fenofibrate      Causes muscle weakness    Hydralazine analogues      Muscle tremors/aches      Isosorbide     Lasix [furosemide]      myalgias    Moxifloxacin Other (See Comments)     Hives   muscle soreness    Timolol     Allegra [fexofenadine] Other (See Comments)     Double vision/spots     Codeine Diarrhea and Other (See Comments)     Loss of balance     Erythromycin Other (See Comments)     Complete weakness/could not walk    Hydrocortisone (bulk) Other (See Comments)     Only suppository/ caused muscle weakness    Macrolide antibiotics Other (See Comments)     Complete weakness/could not walk    Patanol [olopatadine] Other (See Comments)     Muscle weakness    Prednisone Anxiety    Statins-hmg-coa reductase inhibitors Other (See Comments)     Muscle weakness    Xalatan [latanoprost] Other (See Comments)     Muscle weakness        HPI   HPI    1/25/2018, 7:34 AM   History obtained from the patient      History of Present Illness: Shirley Metz is a 85 y.o. female patient who presents to the Emergency Department for SOB which onset gradually this morning after waking up at approximately 4 AM. Sxs are constant and moderate in severity. Pt states her BP was elevated this morning (235/85). Pt recently saw PCP and had verapamil dosage increased, but has yet to take increased  dosage.There are no mitigating or exacerbating factors noted. Associated sxs include nausea, fatigue, urinary frequency.  Pt denies any fever, N/V/D, chills, HA, numbness, CP, ABD pain, dysuria, and all other sxs at this time. No further complaints or concerns at this time.     Arrival mode: Personal vehicle      PCP: Yandy Terrell MD       Past Medical History:  Past Medical History:   Diagnosis Date    Anxiety 11/28/2017    Arthritis     Back pain     Colon polyps     reported per patient.     Fuchs' corneal dystrophy     Glaucoma     Hyperlipidemia     Hypertension     Macular degeneration dry    Obesity     Trouble in sleeping     Urinary tract infection        Past Surgical History:  Past Surgical History:   Procedure Laterality Date    ADENOIDECTOMY  1938    BREAST CYST EXCISION Left 1997 approx    CARPAL TUNNEL RELEASE Right 2012 approx    CATARACT EXTRACTION Bilateral 1994    CHOLECYSTECTOMY  1975    CORNEAL TRANSPLANT Bilateral 08/2015 8/2015 - left; Right 4/2016    EYE SURGERY      Fuch's Corneal dystrophy - had 2nd operation with Dr. Quintanilla    EYE SURGERY      Cataract wIOL    left thumb Left 2011    removed cuboid for arthritis    SLT- OS (aka TRABECULOPLASTY) Left 05/11/2011    repeat 3/14/12    TONSILLECTOMY  1938         Family History:  Family History   Problem Relation Age of Onset    Heart disease Mother     Hypertension Mother     Cancer Father 88     sarcoma( ?[place]'s ) on leg    Deep vein thrombosis Neg Hx     Ovarian cancer Neg Hx     Breast cancer Neg Hx     Kidney disease Neg Hx     Strabismus Neg Hx     Retinal detachment Neg Hx     Macular degeneration Neg Hx     Glaucoma Neg Hx     Blindness Neg Hx     Amblyopia Neg Hx     Eczema Neg Hx     Lupus Neg Hx     Melanoma Neg Hx     Psoriasis Neg Hx     Diabetes Neg Hx     Stroke Neg Hx     Mental retardation Neg Hx     Mental illness Neg Hx     Hyperlipidemia Neg Hx     COPD Neg Hx      Asthma Neg Hx     Depression Neg Hx     Alcohol abuse Neg Hx     Drug abuse Neg Hx        Social History:  Social History     Social History Main Topics    Smoking status: Never Smoker    Smokeless tobacco: Never Used      Comment: history of passive smoking from her ex-    Alcohol use 1.2 oz/week     2 Standard drinks or equivalent per week      Comment: occasional     Drug use: No    Sexual activity: Not Currently     Partners: Male     Birth control/ protection: Post-menopausal      Comment: discussed protection for STD's       ROS   Review of Systems   Constitutional: Positive for fatigue. Negative for fever.        (+) HTN   HENT: Negative for sore throat.    Respiratory: Positive for shortness of breath. Negative for cough.    Cardiovascular: Negative for chest pain, palpitations and leg swelling.   Gastrointestinal: Positive for nausea. Negative for abdominal distention, abdominal pain, diarrhea and vomiting.   Genitourinary: Positive for frequency. Negative for dysuria.   Musculoskeletal: Negative for back pain and gait problem.   Skin: Negative for rash.   Neurological: Negative for weakness.   Hematological: Does not bruise/bleed easily.     Physical Exam      Initial Vitals [01/25/18 0704]   BP Pulse Resp Temp SpO2   (!) 184/86 66 20 98.3 °F (36.8 °C) 95 %      MAP       118.67          Physical Exam  Nursing Notes and Vital Signs Reviewed.  Constitutional: Patient is in no acute distress. Well-developed and well-nourished.  Head: Atraumatic. Normocephalic.  Eyes: PERRL. EOM intact. Conjunctivae are not pale. No scleral icterus.  ENT: Mucous membranes are moist. Oropharynx is clear and symmetric.    Neck: Supple. Full ROM. No lymphadenopathy.  Cardiovascular: Regular rate. Regular rhythm. No murmurs, rubs, or gallops. Distal pulses are 2+ and symmetric.  Pulmonary/Chest: No respiratory distress. Clear to auscultation bilaterally. No wheezing or rales.  Abdominal: Soft and non-distended.   There is no tenderness.  No rebound, guarding, or rigidity. Good bowel sounds.  Genitourinary: No CVA tenderness  Musculoskeletal: Moves all extremities. No obvious deformities. No edema. No calf tenderness.  Skin: Warm and dry.  Neurological:  Alert, awake, and appropriate.  Normal speech.  No acute focal neurological deficits are appreciated.  Psychiatric: Normal affect. Good eye contact. Appropriate in content.    ED Course    Procedures  ED Vital Signs:  Vitals:    01/25/18 0704 01/25/18 0742 01/25/18 0808 01/25/18 0822   BP: (!) 184/86 (!) 165/72  (!) 153/67   Pulse: 66 60 60 60   Resp: 20 (!) 24  (!) 24   Temp: 98.3 °F (36.8 °C)      TempSrc: Oral      SpO2: 95% 95%  95%   Weight: 81 kg (178 lb 9.2 oz)          Abnormal Lab Results:  Labs Reviewed   CBC W/ AUTO DIFFERENTIAL - Abnormal; Notable for the following:        Result Value    RBC 3.77 (*)     Hemoglobin 11.8 (*)     Hematocrit 35.8 (*)     MCH 31.3 (*)     All other components within normal limits   COMPREHENSIVE METABOLIC PANEL - Abnormal; Notable for the following:     BUN, Bld 24 (*)     eGFR if  48 (*)     eGFR if non  41 (*)     All other components within normal limits   B-TYPE NATRIURETIC PEPTIDE - Abnormal; Notable for the following:      (*)     All other components within normal limits   TROPONIN I   PROTIME-INR   APTT        All Lab Results:  Results for orders placed or performed during the hospital encounter of 01/25/18   CBC auto differential   Result Value Ref Range    WBC 7.45 3.90 - 12.70 K/uL    RBC 3.77 (L) 4.00 - 5.40 M/uL    Hemoglobin 11.8 (L) 12.0 - 16.0 g/dL    Hematocrit 35.8 (L) 37.0 - 48.5 %    MCV 95 82 - 98 fL    MCH 31.3 (H) 27.0 - 31.0 pg    MCHC 33.0 32.0 - 36.0 g/dL    RDW 13.8 11.5 - 14.5 %    Platelets 199 150 - 350 K/uL    MPV 9.5 9.2 - 12.9 fL    Gran # (ANC) 5.1 1.8 - 7.7 K/uL    Lymph # 1.5 1.0 - 4.8 K/uL    Mono # 0.7 0.3 - 1.0 K/uL    Eos # 0.1 0.0 - 0.5 K/uL    Baso #  0.02 0.00 - 0.20 K/uL    Gran% 68.1 38.0 - 73.0 %    Lymph% 20.4 18.0 - 48.0 %    Mono% 9.9 4.0 - 15.0 %    Eosinophil% 1.3 0.0 - 8.0 %    Basophil% 0.3 0.0 - 1.9 %    Differential Method Automated    Comprehensive metabolic panel   Result Value Ref Range    Sodium 141 136 - 145 mmol/L    Potassium 4.0 3.5 - 5.1 mmol/L    Chloride 105 95 - 110 mmol/L    CO2 24 23 - 29 mmol/L    Glucose 99 70 - 110 mg/dL    BUN, Bld 24 (H) 8 - 23 mg/dL    Creatinine 1.2 0.5 - 1.4 mg/dL    Calcium 9.9 8.7 - 10.5 mg/dL    Total Protein 7.1 6.0 - 8.4 g/dL    Albumin 3.8 3.5 - 5.2 g/dL    Total Bilirubin 0.8 0.1 - 1.0 mg/dL    Alkaline Phosphatase 71 55 - 135 U/L    AST 23 10 - 40 U/L    ALT 25 10 - 44 U/L    Anion Gap 12 8 - 16 mmol/L    eGFR if African American 48 (A) >60 mL/min/1.73 m^2    eGFR if non African American 41 (A) >60 mL/min/1.73 m^2   Troponin I #1   Result Value Ref Range    Troponin I 0.020 0.000 - 0.026 ng/mL   B-Type natriuretic peptide (BNP)   Result Value Ref Range     (H) 0 - 99 pg/mL   Protime-INR   Result Value Ref Range    Prothrombin Time 10.6 9.0 - 12.5 sec    INR 1.0 0.8 - 1.2   APTT   Result Value Ref Range    aPTT 23.4 21.0 - 32.0 sec         Imaging Results:  Imaging Results          X-Ray Chest AP Portable (Final result)  Result time 01/25/18 08:04:43    Final result by Darell Merritt III, MD (01/25/18 08:04:43)                 Impression:     Cardiomegaly with grossly clear lung fields.      Electronically signed by: DARELL MERRITT MD  Date:     01/25/18  Time:    08:04              Narrative:    One view chest x-ray.    Clinical indication: Chest pain      Heart size is enlarged, grossly unchanged since prior study.  The lung fields are clear with mild chronic changes suggested.  Patient is slightly rotated.. No acute pulmonary infiltrate.                                    The EKG was ordered, reviewed, and independently interpreted by the ED provider.  Interpretation time: 7:16  Rate:  66 BPM  Rhythm: normal sinus rhythm  Interpretation: No acute ST changes. No STEMI.      The Emergency Provider reviewed the vital signs and test results, which are outlined above.    ED Discussion     8:57 AM: Reassessed pt, advised to fill BP medication, states she had a urine test done few days ago and urinalysis was negative, also reports she has an appointment with nephrology in few days with Dr. March. Pt states their condition has improved at this time.  Discussed with pt all pertinent ED information and results. Discussed plan of treatment with pt. Gave pt all f/u and return to the ED instructions. All questions and concerns were addressed at this time. Pt understands and agrees to plan as discussed. Pt is stable for discharge.     Pre-hypertension/Hypertension: The pt has been informed that they may have pre-hypertension or hypertension based on a blood pressure reading in the ED. I recommend that the pt call the PCP listed on their discharge instructions or a physician of their choice this week to arrange f/u for further evaluation of possible pre-hypertension or hypertension.     ED Medication(s):  Medications - No data to display    Discharge Medication List as of 1/25/2018  8:58 AM          Follow-up Information     Yandy Terrell MD. Schedule an appointment as soon as possible for a visit in 1 day.    Specialty:  Family Medicine  Why:  Return to the Emergency Room, If symptoms worsen  Contact information:  [address] [place] [id number]  [phone]                     Medical Decision Making    Medical Decision Making:   Clinical Tests:   Lab Tests: Reviewed and Ordered  Radiological Study: Reviewed and Ordered  Medical Tests: Reviewed and Ordered           Scribe Attestation:   Scribe #1: I performed the above scribed service and the documentation accurately describes the services I performed. I attest to the accuracy of the note.    Attending:   Physician Attestation Statement for Scribe  #1: I, Trang Tobar MD, personally performed the services described in this documentation, as scribed by Bryson Feliz, in my presence, and it is both accurate and complete.          Clinical Impression       ICD-10-CM ICD-9-CM   1. Essential hypertension I10 401.9   2. Dyspnea, unspecified type R06.00 786.09   3. Chronic left shoulder pain M25.512 719.41    G89.29 338.29       Disposition:   Disposition: Discharged  Condition: Stable         Trang Tobar MD  01/29/18 1624

## 2018-01-25 NOTE — ED NOTES
Patient requesting to use bathroom before being roomed. Patient able to ambulate without difficulty.

## 2018-01-29 ENCOUNTER — CLINICAL SUPPORT (OUTPATIENT)
Dept: REHABILITATION | Facility: HOSPITAL | Age: 83
End: 2018-01-29
Payer: MEDICARE

## 2018-01-29 DIAGNOSIS — M25.512 CHRONIC LEFT SHOULDER PAIN: ICD-10-CM

## 2018-01-29 DIAGNOSIS — M15.9 PRIMARY OSTEOARTHRITIS INVOLVING MULTIPLE JOINTS: Primary | ICD-10-CM

## 2018-01-29 DIAGNOSIS — G89.29 CHRONIC LEFT SHOULDER PAIN: ICD-10-CM

## 2018-01-29 PROCEDURE — 97140 MANUAL THERAPY 1/> REGIONS: CPT

## 2018-01-29 NOTE — PROGRESS NOTES
PHYSICAL THERAPY Daily Note    Referring Provider:  Suzan Gregorio    Diagnosis:       ICD-10-CM ICD-9-CM    1. Primary osteoarthritis involving multiple joints M15.0 715.09    2. Chronic left shoulder pain M25.512 719.41     G89.29 338.29             Orders:  Evaluate and Treat    Date of Initial Evaluation: 9-28-17      Visit # 16 of 20    SUBJECTIVE: Pt. Reports that kinesiotaping did help. Pt. Reports that she has pain in L shoulder when driving.  Pt. Reports that she went to ER last week secondary BP increased- systolic 235.  Pt. Reports having trouble with breathing sometimes.  Pt. Reports that she adjusted the time for taking her pain pill and an  extra strength BP pill was prescribed for her.     Current Pain: 4/10 B UE      OBJECTIVE:    /70    TREATMENT PROVIDED:  -Manual Therapy: STM x 8 min. To B lateral deltoids and B proximal brachialis; PROM L shoulder x 2 min.   -Therapeutic Exercise:  N/A  -Modalities: US x 8 min. To R prox. brachialis (4 min. Each), and L posterior shoulder joint (4 min.)  -Kinesiotape applied L shoulder to increase stability.  Kinesiotape applied to R shoulder R prox. Brachialis to decrease pain.    ASSESSMENT: Pt. Noted to have prominent muscle tension at R proximal brachialis and mild to moderate muscle tension L lateral deltoid and prox. Brachialis.  Pt. Still moderately to significantly limited with L shoulder PROM.  Kinesiotape performed L shoulder joint for stability and R proximal brachialis for pain.  Pt. Reported that her 'arthritis' doctor recommended dry needling.  Will begin dry needling next PT visit to help decrease pain and muscle tension.      PLAN:  Patient will benefit from physical therapy (1) x/week for (6-8) weeks including manual therapy, therapeutic exercise, functional activities, modalities, and patient education.    Thank you for this referral.    These services are reasonable and necessary for the conditions set forth above while under my care.

## 2018-01-31 ENCOUNTER — OFFICE VISIT (OUTPATIENT)
Dept: NEPHROLOGY | Facility: CLINIC | Age: 83
End: 2018-01-31
Payer: MEDICARE

## 2018-01-31 VITALS
BODY MASS INDEX: 29.77 KG/M2 | HEART RATE: 68 BPM | HEIGHT: 64 IN | SYSTOLIC BLOOD PRESSURE: 140 MMHG | WEIGHT: 174.38 LBS | DIASTOLIC BLOOD PRESSURE: 60 MMHG

## 2018-01-31 DIAGNOSIS — I11.9 HYPERTENSIVE LEFT VENTRICULAR HYPERTROPHY, WITHOUT HEART FAILURE: Chronic | ICD-10-CM

## 2018-01-31 DIAGNOSIS — N18.30 CHRONIC KIDNEY DISEASE, STAGE 3: Primary | ICD-10-CM

## 2018-01-31 PROCEDURE — 1125F AMNT PAIN NOTED PAIN PRSNT: CPT | Mod: S$GLB,,, | Performed by: INTERNAL MEDICINE

## 2018-01-31 PROCEDURE — 99999 PR PBB SHADOW E&M-EST. PATIENT-LVL IV: CPT | Mod: PBBFAC,,, | Performed by: INTERNAL MEDICINE

## 2018-01-31 PROCEDURE — 99213 OFFICE O/P EST LOW 20 MIN: CPT | Mod: S$GLB,,, | Performed by: INTERNAL MEDICINE

## 2018-01-31 PROCEDURE — 99499 UNLISTED E&M SERVICE: CPT | Mod: S$GLB,,, | Performed by: INTERNAL MEDICINE

## 2018-01-31 PROCEDURE — 1159F MED LIST DOCD IN RCRD: CPT | Mod: S$GLB,,, | Performed by: INTERNAL MEDICINE

## 2018-01-31 PROCEDURE — 3008F BODY MASS INDEX DOCD: CPT | Mod: S$GLB,,, | Performed by: INTERNAL MEDICINE

## 2018-01-31 PROCEDURE — 99354 PR PROLONGED SVC, OUPT, 1ST HR: CPT | Mod: S$GLB,,, | Performed by: INTERNAL MEDICINE

## 2018-01-31 NOTE — PATIENT INSTRUCTIONS
1.  Hypertension: Negative screening for renal artery stenosis no further workup at this point; patient has been managed by cardiology.  I would not make any adjustments in medications at this time.    Issues with dipping of BP in sleep vs ROBINSON vs evaluation for chronic bronchitis with possible evaluation with pulmonary if needed d/w patient       2.  Chronic kidney disease stage III: No recurrent UTI.  No heavy proteinuria and no recent hematuria.  Check Cystatin C  on follow-up.  Patient's PTH and vitamin D were normal in the past.  Latest vitamin D was checked in 2017.      Follow-up one year      Mineral oil for constipation

## 2018-01-31 NOTE — PROGRESS NOTES
Subjective:       Patient ID: Shirley Metz is a 85 y.o. White female who presents for follow up  evaluation of Chronic Kidney Disease; recurrent UTI; and Hypertension    Hematuria   Irritative symptoms do not include frequency or urgency. Pertinent negatives include no abdominal pain, chills, dysuria, fever, flank pain, nausea or vomiting.   Hypertension   This is a chronic problem. The current episode started more than 1 year ago. The problem has been waxing and waning since onset. The problem is resistant. Associated symptoms include anxiety, malaise/fatigue and peripheral edema. Pertinent negatives include no chest pain, headaches, neck pain, palpitations or shortness of breath. There are no associated agents (passive smoking years ago ) to hypertension. Risk factors for coronary artery disease include dyslipidemia, obesity, post-menopausal state, sedentary lifestyle and smoking/tobacco exposure. Past treatments include angiotensin blockers, beta blockers, calcium channel blockers and diuretics. The current treatment provides moderate improvement. There are no compliance problems.  Hypertensive end-organ damage includes left ventricular hypertrophy. Identifiable causes of hypertension include sleep apnea.   Edema   Associated symptoms include myalgias. Pertinent negatives include no abdominal pain, arthralgias, chest pain, chills, congestion, coughing, diaphoresis, fatigue, fever, headaches, joint swelling, nausea, neck pain, numbness, rash, vomiting or weakness.     Patient is an 85-year-old white female with long-standing history of hypertension which was adequately controlled in 2014 .  She has had issues of edema of the legs and also intolerance to different medications.  She has had severe weakness of her arms and legs for which she is now walking with a walker.  Apparently she has had severe side effects from cholesterol medications.  He has some symptoms of sleep apnea.        In 9/2014 she  comes in for consultation for fluctuating blood pressure status post 3 visits to the emergency room.  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 Cardiology consulted. Patient treated with HCTZ, Losartan, Metoprolol, and PRN antihypertensives. EKG unremarkable. 2D ECHO with EF 60-65% with DD and pulmonary HTN. Troponin trended up. NM stress test negative on 05/15/17. BP improved. .        January 2018 Today seen for follow up after a gap of over 2 years.  Creatinine has been fluctuating between 1.2 and 1.3.  No recent UTI.  Records reviewed 2015.  Normal vitamin D level.  Recent emergency room visit was for shortness of breath and hypertension uncontrolled.  Patient has been followed in multiple specialties including primary care physician Dr. Terrell, due to multiple ALLERGIES/reactions from medications patient has been managed with multiple blood pressure medications without clear evidence of ALLERGY.  Patient has had history of carvedilol causing ALLERGIC reaction but when she was seen in the emergency room no evidence was documented ALLERGY or lip swelling.  Patient has been very difficult to control with blood pressure.  Patient has been followed and managed in cardiology  division as well as rheumatology division.  Patient does have diastolic dysfunction and pulmonary hypertension with evidence of hypertension uncontrolled. Corneal transplant both sides. Off HCTZ due to polyuria       Review of Systems   Constitutional: Positive for malaise/fatigue. Negative for activity change, appetite change, chills, diaphoresis, fatigue, fever and unexpected weight change.   HENT: Negative for congestion, dental problem, drooling, ear discharge, hearing loss, postnasal drip, rhinorrhea and voice change.    Eyes: Negative for discharge and visual disturbance.   Respiratory: Negative for apnea, cough, choking, chest tightness, shortness of breath, wheezing and stridor.    Cardiovascular: Negative for chest pain, palpitations and leg swelling.   Gastrointestinal: Negative for abdominal distention, abdominal pain, blood in stool, constipation, diarrhea, nausea, rectal pain and vomiting.   Endocrine: Negative for cold intolerance, heat intolerance, polydipsia and polyuria.   Genitourinary: Negative for decreased urine volume, difficulty urinating, dyspareunia, dysuria, enuresis, flank pain, frequency, genital sores, hematuria, pelvic pain, urgency and vaginal discharge.   Musculoskeletal: Positive for gait problem and myalgias. Negative for arthralgias, back pain, joint swelling, neck pain and neck stiffness.   Skin: Negative for color change and rash.   Allergic/Immunologic: Negative for food allergies and immunocompromised state.   Neurological: Negative for dizziness, tremors, seizures, syncope, weakness, light-headedness, numbness and headaches.   Hematological: Does not bruise/bleed easily.   Psychiatric/Behavioral: Negative for agitation, behavioral problems, confusion, self-injury, sleep disturbance and suicidal ideas. The patient is not nervous/anxious and is not hyperactive.    All other systems reviewed and are negative.      Objective:       Vitals:    01/31/18 0854   BP: (!) 140/60  "  Pulse: 68   Weight: 79.1 kg (174 lb 6.1 oz)   Height: 5' 4" (1.626 m)       Physical Exam   Constitutional: She is oriented to person, place, and time. She appears well-developed and well-nourished.   HENT:   Head: Normocephalic and atraumatic.   Eyes: Conjunctivae and EOM are normal. Pupils are equal, round, and reactive to light.   Neck: Normal range of motion and full passive range of motion without pain. Neck supple. Carotid bruit is not present. No edema present. No thyroid mass and no thyromegaly present.   Cardiovascular: Normal rate, regular rhythm, S1 normal, S2 normal, normal heart sounds, intact distal pulses and normal pulses.   No extrasystoles are present. PMI is not displaced.  Exam reveals no friction rub.    No murmur heard.  Pulmonary/Chest: Effort normal and breath sounds normal. No accessory muscle usage. No respiratory distress. She has no wheezes. She has no rales. She exhibits no tenderness.   Abdominal: Soft. Bowel sounds are normal. She exhibits no distension and no mass. There is no hepatosplenomegaly. There is no tenderness. There is no rebound and no CVA tenderness. No hernia.   Musculoskeletal: Normal range of motion. She exhibits no edema or tenderness.   Neurological: She is alert and oriented to person, place, and time. She has normal reflexes. No cranial nerve deficit or sensory deficit. She exhibits normal muscle tone. Coordination normal.   Skin: Skin is warm and dry. No bruising, no ecchymosis and no rash noted. No cyanosis or erythema. No pallor. Nails show no clubbing.   Cholesterol emboli in both feet are better now    Psychiatric: She has a normal mood and affect. Her speech is normal and behavior is normal. Judgment and thought content normal.         Lab Results   Component Value Date    WBC 7.45 01/25/2018    HGB 11.8 (L) 01/25/2018    HCT 35.8 (L) 01/25/2018    MCV 95 01/25/2018     01/25/2018     Lab Results   Component Value Date    CREATININE 1.2 01/25/2018    " BUN 24 (H) 01/25/2018     01/25/2018    K 4.0 01/25/2018     01/25/2018    CO2 24 01/25/2018       Assessment:       1. Chronic kidney disease, stage 3    2. 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  Hypertension: Negative screening for renal artery stenosis no further workup at this point; patient has been managed by cardiology.  I would not make any adjustments in medications at this time.    Issues with dipping of BP in sleep vs ROBINSON vs evaluation for chronic bronchitis with possible evaluation with pulmonary if needed d/w patient       2.  Chronic kidney disease stage III: No recurrent UTI.  No heavy proteinuria and no recent hematuria.  Check Cystatin C  on follow-up.  Patient's PTH and vitamin D were normal in the past.  Latest vitamin D was checked in 2017.      Extended time was 30 minutes which was spent face-to-face with the patient.  Greater than 70% of the extended time was spent in discussing all of the complex medical problems including chronic kidney disease, management of hypertension, nocturnal high blood pressure, importance of evaluation of pulmonary hypertension and possibility of sleep apnea.  Reading material about blood pressure, nocturnal high blood pressure and nonpitting blood pressure given to the patient in detail.    Follow-up one year        Lana Beach MD

## 2018-02-01 ENCOUNTER — CLINICAL SUPPORT (OUTPATIENT)
Dept: REHABILITATION | Facility: HOSPITAL | Age: 83
End: 2018-02-01
Payer: MEDICARE

## 2018-02-01 DIAGNOSIS — G89.29 CHRONIC LEFT SHOULDER PAIN: ICD-10-CM

## 2018-02-01 DIAGNOSIS — M15.9 PRIMARY OSTEOARTHRITIS INVOLVING MULTIPLE JOINTS: Primary | ICD-10-CM

## 2018-02-01 DIAGNOSIS — M25.512 CHRONIC LEFT SHOULDER PAIN: ICD-10-CM

## 2018-02-01 PROCEDURE — G8984 CARRY CURRENT STATUS: HCPCS | Mod: CK

## 2018-02-01 PROCEDURE — G8985 CARRY GOAL STATUS: HCPCS | Mod: CI

## 2018-02-01 PROCEDURE — 97164 PT RE-EVAL EST PLAN CARE: CPT

## 2018-02-01 PROCEDURE — 97140 MANUAL THERAPY 1/> REGIONS: CPT

## 2018-02-01 NOTE — PROGRESS NOTES
PHYSICAL THERAPY Re-Evaluation    Referring Provider:  Suzan Gregorio    Diagnosis:       ICD-10-CM ICD-9-CM    1. Primary osteoarthritis involving multiple joints M15.0 715.09    2. Chronic left shoulder pain M25.512 719.41     G89.29 338.29             Orders:  Evaluate and Treat    Date of Initial Evaluation: 9-28-17      Visit # 16 of 20    SUBJECTIVE:  Pt. Reports using a long handled brush to wash back .  Pt. Reports that both shoulders hurt with driving.    Current Pain: 2/10 B UE      OBJECTIVE:    Sensation:  B UE intact              10-26-17   12-5-17    1-3-17    2-1-18          Evaluation   Reeval   Re-Eval    Re-Eval   Re-Eval         R  L   R  L   R  L   R  L        R L   Shoulder A/PROM: Flexion     113  93   123  120  120P!  97P!   122  110       140        110     Extension   57  40   WnL  WNL  WNL P! WNL   WNL  WNL     WNL      WNL     Abduction   130  100   115  105  130click 97   140  120       138         95      Adduction-(hor.)  30  15   20  35  30 P!  35   12             10          23          20     Internal Rotation  63  59   45  65  65  WNL Neutral  45-ABD 72   74          18 abducted     External Rotation  70  63 'click' per pt. 85  63  70  30 grinding Neutral 95  45                     90          18 neutral, 40 degrees abd                10-26-17   12-5-17   1-3-17     2-1-18          Evaluation   Re-Eval   Re-Eval   Re-Eval    Re-Eval         R  L   R  L  R  L  R L         R L  Strength:  Supraspinatus   1+/5  1+/5   1+/5  1+/5  1+/5  1+/5  1+/5    1+/5              1+/5         1+/5     Subscapularis   1+/5  1+/5   1+/5  1+/5  1+/5  1+/5  3+/5    1+/5    4/5         1+/5     Latissimus dorsi   4/5  1/5   4/5  1/5  3+/5  1+/5  3+/5    1+/5    4+/5      1+/5         Rhomboids   4/5  4/5   4/5  4/5  3+/5  4/5  4/5    4/5       4/5         4/5      Upper Trapezius  4/5  4/5   4/5  4/5  5/5  5/5  5/5    5/5    5/5         5/5     Middle Trapezius  4/5 4/5 4/5  4/5  3+/5  3+/5  3+/5     3+/5    3+/5       3+/5     Lower Trapezius  4/5  4/5   4/5  4/5  3+/5  1+/5  3+/5    1+/5    4+/5       1+/5     Levator scapula  4/5  4/5   4/5  4/5  4/5  4/5  4/5     4/5                                       5/5    3+/5     Biceps    4/5  3+/5   4+/5  4+/5  4/5  4/5  5/5           5/5     5/5           4/5     Triceps   4/5  3+/5   4/5  4/5  4/5  4/5  5/5     5/5     5/5           4/5     shld flexors   1+/5  1+/5   1+/5  1+/5  1+/5  1+/5  3+/5     3+/5   4/5           1+/5     Extensors   4/5  4/5   5/5  5/5  3+/5  4/5  4/5     4/5     4/5            1+/5     Abductors   1+/5  1+/5   1+/5  1+/5  1+/5  1+/5  1+/5    1+/5    4/5            1+/5           R  L   R  L  R  L  R L    R L  Special test:  NEER     -  +   -  -  -  -  -    -    -   +      Moon sukhdeep   -  +   -  +  -  -  -    -        -              +      Drop arm    -  +   -  -  -  -  -    -                                                -              +    Empty can    -  -   -  -  -  -  -    -                                                -              +      Int. Rot. Functional Reach     T6  S1   T7  T12  T7  L1  T6    T10        T6           T10    GHJ mobility: Initial Eval-crepitus noted with L external and L internal rotation      Tenderness to palpation:  L upper trapezius, L lateral suprapinatus fossa; Re-Eval 12-5-17 no tenderness         Function: Patient reports 39% disability based on score of the Upper Extremity Functional Scale on eval 10-2-17    Patient reports 71% disability based on score of the Upper Extremity Functional Scale on eval 12-5-17    Patient reports 47% disability based on score of the Upper Extremity Functional Scale on eval 12-5-17    Patient reports 52.3% disability based on score of the Upper Extremity Functional Scale on eval 12-5-17    Functional Limitations and Goal:  This patient's primary Physical Therapy goal is to return to their prior level of function (Car[place] G-0760) without limitations.  The  patient's current level of impairment is 40-60  percent impaired (CK) based on their score of 47 percent impaired on the Quick DASH Shoulder Questionnaire.  The patient is expected to achieve a score of 1 to 19 percent impaired ( G-8985  CI ) within 10 treatments.      ASSESSMENT: Pt. Has had decreased pain level last few visits.  Pt.'s R shoulder flexion, abduction, and adduction have improved with AROM.  L shoulder abduction actually has continued to decrease, but horizontal adduction has increased.  Increased R shoulder strength noted grossly.  Decreased L biceps and triceps strength noted the last few weeks.  Pt. Had less muscle tension in B lateral deltoids and triceps today.  Pt. Reports that she feels kinesiotape helped to decrease her pain. Dry needling to B lateral deltoids and B triceps performed today by Pete Hernandez, PT .  After dry needling, no muscle tension noted L lateral deltoids or triceps and mild muscle tension still noted R lateral deltoids and triceps.  Cont. POC.        Short Term Goals:  1-4 weeks  1. I with HEP MET  2. 75% R Shoulder AROM Not Met  3. Decrease pain B shoulders to less than 4/10 MET     Long Term Goals: 6-8 weeks  1. Return to activities with pain less than 2/10 Not Met  2.Pt. To have full R shoulder AROM to don/ doff shirt with ease Not Met  3. Increase R shoulder strength to 5/5 to lift groceries and moderate size objects. Not Met  4. Pt. To have 75% L shoulder AROM to lift objects from overhead cabinets. Not Met  4. Increase L shoulder functional internal rotation reach to T6 to bathe opposite side of body. Not Met    TREATMENT PROVIDED:  -Manual Therapy: STM x 10 min. To B lateral deltoids and R proximal brachialis; Dry Needling to B lateral deltoids and triceps x 5 min.  -Therapeutic Exercise:  N/A  -Modalities: N/A  -Re-Evaluation completed  - Kinesiotape to L shoulder for stabilization      PLAN:  Patient will benefit from physical therapy (1) x/week for (6-8) weeks  including manual therapy, therapeutic exercise, functional activities, modalities, and patient education.    Thank you for this referral.    These services are reasonable and necessary for the conditions set forth above while under my care.

## 2018-02-01 NOTE — PLAN OF CARE
PHYSICAL THERAPY Re-Evaluation    Referring Provider:  Suzan Gregorio    Diagnosis:       ICD-10-CM ICD-9-CM    1. Primary osteoarthritis involving multiple joints M15.0 715.09    2. Chronic left shoulder pain M25.512 719.41     G89.29 338.29             Orders:  Evaluate and Treat    Date of Initial Evaluation: 9-28-17      Visit # 16 of 20    SUBJECTIVE:  Pt. Reports using a long handled brush to wash back .  Pt. Reports that both shoulders hurt with driving.    Current Pain: 2/10 B UE      OBJECTIVE:    Sensation:  B UE intact              10-26-17   12-5-17    1-3-17    2-1-18          Evaluation   Reeval   Re-Eval    Re-Eval   Re-Eval         R  L   R  L   R  L   R  L        R L   Shoulder A/PROM: Flexion     113  93   123  120  120P!  97P!   122  110       140        110     Extension   57  40   WnL  WNL  WNL P! WNL   WNL  WNL     WNL      WNL     Abduction   130  100   115  105  130click 97   140  120       138         95      Adduction-(hor.)  30  15   20  35  30 P!  35   12             10          23          20     Internal Rotation  63  59   45  65  65  WNL Neutral  45-ABD 72   74          18 abducted     External Rotation  70  63 'click' per pt. 85  63  70  30 grinding Neutral 95  45                     90          18 neutral, 40 degrees abd                10-26-17   12-5-17   1-3-17     2-1-18          Evaluation   Re-Eval   Re-Eval   Re-Eval    Re-Eval         R  L   R  L  R  L  R L         R L  Strength:  Supraspinatus   1+/5  1+/5   1+/5  1+/5  1+/5  1+/5  1+/5    1+/5              1+/5         1+/5     Subscapularis   1+/5  1+/5   1+/5  1+/5  1+/5  1+/5  3+/5    1+/5    4/5         1+/5     Latissimus dorsi   4/5  1/5   4/5  1/5  3+/5  1+/5  3+/5    1+/5    4+/5      1+/5         Rhomboids   4/5  4/5   4/5  4/5  3+/5  4/5  4/5    4/5       4/5         4/5      Upper Trapezius  4/5  4/5   4/5  4/5  5/5  5/5  5/5    5/5    5/5         5/5     Middle Trapezius  4/5 4/5 4/5  4/5  3+/5  3+/5  3+/5     3+/5    3+/5       3+/5     Lower Trapezius  4/5  4/5   4/5  4/5  3+/5  1+/5  3+/5    1+/5    4+/5       1+/5     Levator scapula  4/5  4/5   4/5  4/5  4/5  4/5  4/5     4/5                                       5/5    3+/5     Biceps    4/5  3+/5   4+/5  4+/5  4/5  4/5  5/5           5/5     5/5           4/5     Triceps   4/5  3+/5   4/5  4/5  4/5  4/5  5/5     5/5     5/5           4/5     shld flexors   1+/5  1+/5   1+/5  1+/5  1+/5  1+/5  3+/5     3+/5   4/5           1+/5     Extensors   4/5  4/5   5/5  5/5  3+/5  4/5  4/5     4/5     4/5            1+/5     Abductors   1+/5  1+/5   1+/5  1+/5  1+/5  1+/5  1+/5    1+/5    4/5            1+/5           R  L   R  L  R  L  R L    R L  Special test:  NEER     -  +   -  -  -  -  -    -    -   +      Moon sukhdeep   -  +   -  +  -  -  -    -        -              +      Drop arm    -  +   -  -  -  -  -    -                                                -              +    Empty can    -  -   -  -  -  -  -    -                                                -              +      Int. Rot. Functional Reach     T6  S1   T7  T12  T7  L1  T6    T10        T6           T10    GHJ mobility: Initial Eval-crepitus noted with L external and L internal rotation      Tenderness to palpation:  L upper trapezius, L lateral suprapinatus fossa; Re-Eval 12-5-17 no tenderness         Function: Patient reports 39% disability based on score of the Upper Extremity Functional Scale on eval 10-2-17    Patient reports 71% disability based on score of the Upper Extremity Functional Scale on eval 12-5-17    Patient reports 47% disability based on score of the Upper Extremity Functional Scale on eval 12-5-17    Patient reports 52.3% disability based on score of the Upper Extremity Functional Scale on eval 12-5-17    Functional Limitations and Goal:  This patient's primary Physical Therapy goal is to return to their prior level of function (Carryii G-7025) without limitations.  The  patient's current level of impairment is 40-60  percent impaired (CK) based on their score of 47 percent impaired on the Quick DASH Shoulder Questionnaire.  The patient is expected to achieve a score of 1 to 19 percent impaired ( G-8985  CI ) within 10 treatments.      ASSESSMENT: Pt. Has had decreased pain level last few visits.  Pt.'s R shoulder flexion, abduction, and adduction have improved with AROM.  L shoulder abduction actually has continued to decrease, but horizontal adduction has increased.  Increased R shoulder strength noted grossly.  Decreased L biceps and triceps strength noted the last few weeks.  Pt. Had less muscle tension in B lateral deltoids and triceps today.  Pt. Reports that she feels kinesiotape helped to decrease her pain. Dry needling to B lateral deltoids and B triceps performed today by Pete Hernandez, PT .  After dry needling, no muscle tension noted L lateral deltoids or triceps and mild muscle tension still noted R lateral deltoids and triceps.  Cont. POC.        Short Term Goals:  1-4 weeks  1. I with HEP MET  2. 75% R Shoulder AROM Not Met  3. Decrease pain B shoulders to less than 4/10 MET     Long Term Goals: 6-8 weeks  1. Return to activities with pain less than 2/10 Not Met  2.Pt. To have full R shoulder AROM to don/ doff shirt with ease Not Met  3. Increase R shoulder strength to 5/5 to lift groceries and moderate size objects. Not Met  4. Pt. To have 75% L shoulder AROM to lift objects from overhead cabinets. Not Met  4. Increase L shoulder functional internal rotation reach to T6 to bathe opposite side of body. Not Met    TREATMENT PROVIDED:  -Manual Therapy: STM x 10 min. To B lateral deltoids and R proximal brachialis; Dry Needling to B lateral deltoids and triceps x 5 min.  -Therapeutic Exercise:  N/A  -Modalities: N/A  -Re-Evaluation completed  - Kinesiotape to L shoulder for stabilization      PLAN:  Patient will benefit from physical therapy (1) x/week for (6-8) weeks  including manual therapy, therapeutic exercise, functional activities, modalities, and patient education.    Thank you for this referral.    These services are reasonable and necessary for the conditions set forth above while under my care.

## 2018-02-05 ENCOUNTER — OFFICE VISIT (OUTPATIENT)
Dept: DERMATOLOGY | Facility: CLINIC | Age: 83
End: 2018-02-05
Payer: MEDICARE

## 2018-02-05 ENCOUNTER — PATIENT OUTREACH (OUTPATIENT)
Dept: ADMINISTRATIVE | Facility: HOSPITAL | Age: 83
End: 2018-02-05

## 2018-02-05 ENCOUNTER — PATIENT MESSAGE (OUTPATIENT)
Dept: INTERNAL MEDICINE | Facility: CLINIC | Age: 83
End: 2018-02-05

## 2018-02-05 DIAGNOSIS — D18.00 ANGIOMA: ICD-10-CM

## 2018-02-05 DIAGNOSIS — R23.8 SKIN IRRITATION: ICD-10-CM

## 2018-02-05 DIAGNOSIS — L82.1 SEBORRHEIC KERATOSIS: Primary | ICD-10-CM

## 2018-02-05 PROCEDURE — 1159F MED LIST DOCD IN RCRD: CPT | Mod: S$GLB,,, | Performed by: DERMATOLOGY

## 2018-02-05 PROCEDURE — 99213 OFFICE O/P EST LOW 20 MIN: CPT | Mod: S$GLB,,, | Performed by: DERMATOLOGY

## 2018-02-05 PROCEDURE — 99999 PR PBB SHADOW E&M-EST. PATIENT-LVL II: CPT | Mod: PBBFAC,,, | Performed by: DERMATOLOGY

## 2018-02-05 PROCEDURE — 3008F BODY MASS INDEX DOCD: CPT | Mod: S$GLB,,, | Performed by: DERMATOLOGY

## 2018-02-05 RX ORDER — TRIAMCINOLONE ACETONIDE 1 MG/G
CREAM TOPICAL 2 TIMES DAILY PRN
Qty: 30 G | Refills: 1 | Status: ON HOLD | OUTPATIENT
Start: 2018-02-05 | End: 2018-03-16 | Stop reason: HOSPADM

## 2018-02-05 NOTE — PROGRESS NOTES
Subjective:       Patient ID:  Shirley Metz is a 85 y.o. female who presents for   Chief Complaint   Patient presents with    Mole     c/o new moles on chest x several months.      Hx of seb derm, last seen on 2/3/16.  She c/o several lesions on chest x several months. No tx tried.         Review of Systems   Constitutional: Negative for fever and chills.   Gastrointestinal: Negative for nausea and vomiting.   Skin: Positive for activity-related sunscreen use. Negative for daily sunscreen use and recent sunburn.   Hematologic/Lymphatic: Does not bruise/bleed easily.        Objective:    Physical Exam   Constitutional: She appears well-developed and well-nourished. No distress.   Neurological: She is alert and oriented to person, place, and time. She is not disoriented.   Psychiatric: She has a normal mood and affect.   Skin:   Areas Examined (abnormalities noted in diagram):   Head / Face Inspection Performed  Neck Inspection Performed  Chest / Axilla Inspection Performed  Abdomen Inspection Performed  Back Inspection Performed  RUE Inspected  LUE Inspection Performed  Nails and Digits Inspection Performed                   Diagram Legend     Erythematous scaling macule/papule c/w actinic keratosis       Vascular papule c/w angioma      Pigmented verrucoid papule/plaque c/w seborrheic keratosis      Yellow umbilicated papule c/w sebaceous hyperplasia      Irregularly shaped tan macule c/w lentigo     1-2 mm smooth white papules consistent with Milia      Movable subcutaneous cyst with punctum c/w epidermal inclusion cyst      Subcutaneous movable cyst c/w pilar cyst      Firm pink to brown papule c/w dermatofibroma      Pedunculated fleshy papule(s) c/w skin tag(s)      Evenly pigmented macule c/w junctional nevus     Mildly variegated pigmented, slightly irregular-bordered macule c/w mildly atypical nevus      Flesh colored to evenly pigmented papule c/w intradermal nevus       Pink pearly  papule/plaque c/w basal cell carcinoma      Erythematous hyperkeratotic cursted plaque c/w SCC      Surgical scar with no sign of skin cancer recurrence      Open and closed comedones      Inflammatory papules and pustules      Verrucoid papule consistent consistent with wart     Erythematous eczematous patches and plaques     Dystrophic onycholytic nail with subungual debris c/w onychomycosis     Umbilicated papule    Erythematous-base heme-crusted tan verrucoid plaque consistent with inflamed seborrheic keratosis     Erythematous Silvery Scaling Plaque c/w Psoriasis     See annotation      Assessment / Plan:        Seborrheic keratosis  Reassurance given.  Lesions are benign.    Angioma  Reassurance given.  Lesions are benign.    Skin irritation  -     triamcinolone acetonide 0.1% (KENALOG) 0.1 % cream; Apply topically 2 (two) times daily as needed. itch  Dispense: 30 g; Refill: 1  -     Recommend above med for lesion on left back. If fails to resolve, recommend pt return to clinic for biopsy. The patient acknowledged understanding.              Follow-up if symptoms worsen or fail to improve.

## 2018-02-05 NOTE — PROGRESS NOTES
Last documented 2017 Medication reconciliation Post d/c has been sent to [facility] as attestation documentation for MEDICATION RECONCILITION.(PER [facility] CALL 05/18/17) Measure has been met.

## 2018-02-06 NOTE — TELEPHONE ENCOUNTER
I spoke w pt - she is having stools about every 1 1/2 days - while taking miralax daily for 3-4 weeks, daily prune juice, and daily Dulcolax bisacodyl.  She is having to strain a lot as well.    Advised to go back to her 120 mg verapamil.  She has this.  I will see her in one week.  She is also given a recipe for a fiber supplement from applesauce/wheat bran/prune juice.  Recommend stopping the bisacodyl and starting stool softener instead daily 100 mg of docusate sodium.

## 2018-02-07 ENCOUNTER — CLINICAL SUPPORT (OUTPATIENT)
Dept: REHABILITATION | Facility: HOSPITAL | Age: 83
End: 2018-02-07
Payer: MEDICARE

## 2018-02-07 DIAGNOSIS — M15.9 PRIMARY OSTEOARTHRITIS INVOLVING MULTIPLE JOINTS: Primary | ICD-10-CM

## 2018-02-07 DIAGNOSIS — M25.512 CHRONIC LEFT SHOULDER PAIN: ICD-10-CM

## 2018-02-07 DIAGNOSIS — G89.29 CHRONIC LEFT SHOULDER PAIN: ICD-10-CM

## 2018-02-07 PROCEDURE — 97035 APP MDLTY 1+ULTRASOUND EA 15: CPT

## 2018-02-07 PROCEDURE — 97140 MANUAL THERAPY 1/> REGIONS: CPT

## 2018-02-07 NOTE — PROGRESS NOTES
PHYSICAL THERAPY Daily Note    Referring Provider:  Suzan Gregorio    Diagnosis:       ICD-10-CM ICD-9-CM    1. Primary osteoarthritis involving multiple joints M15.0 715.09    2. Chronic left shoulder pain M25.512 719.41     G89.29 338.29             Orders:  Evaluate and Treat    Date of Initial Evaluation: 9-28-17      Visit # 19 of 20    SUBJECTIVE:  Pt. Reports soreness after dry needling but went away and feels that it helped her upper arms and she was able to sleep longer during the night without waking up.  Pt. Reports that kinesiotaping is helping but preferred initial technique.      Current Pain: 2/10 B UE      OBJECTIVE:    TREATMENT PROVIDED:  -Manual Therapy: STM x 12 min. To B lateral deltoids and R proximal brachialis; PROM x 4 min. To L shoulder   -Therapeutic Exercise:  N/A  -Modalities: US x 8 min. To B prox. Brachialis (x 4 min. Each)     .      ASSESSMENT: Pt. Did not want dry needling or kinesiotape today to see how long dry needling last.  Less muscle tension noted B shoulders.  Pt. Still tolerating L shoulder PROM well with moderate restrictions but less crepitus in L shoulder joint.          PLAN:  Patient will benefit from physical therapy (1) x/week for (6-8) weeks including manual therapy, therapeutic exercise, functional activities, modalities, and patient education.    Thank you for this referral.    These services are reasonable and necessary for the conditions set forth above while under my care.

## 2018-02-08 ENCOUNTER — OFFICE VISIT (OUTPATIENT)
Dept: OPHTHALMOLOGY | Facility: CLINIC | Age: 83
End: 2018-02-08
Payer: MEDICARE

## 2018-02-08 DIAGNOSIS — H04.129 DRY EYE: ICD-10-CM

## 2018-02-08 DIAGNOSIS — Z96.1 PSEUDOPHAKIA: ICD-10-CM

## 2018-02-08 DIAGNOSIS — Z94.7 HISTORY OF CORNEA TRANSPLANT: ICD-10-CM

## 2018-02-08 DIAGNOSIS — H40.1134 PRIMARY OPEN ANGLE GLAUCOMA OF BOTH EYES, INDETERMINATE STAGE: Primary | ICD-10-CM

## 2018-02-08 PROCEDURE — 99999 PR PBB SHADOW E&M-EST. PATIENT-LVL II: CPT | Mod: PBBFAC,,, | Performed by: OPHTHALMOLOGY

## 2018-02-08 PROCEDURE — 99499 UNLISTED E&M SERVICE: CPT | Mod: S$GLB,,, | Performed by: OPHTHALMOLOGY

## 2018-02-08 PROCEDURE — 92012 INTRM OPH EXAM EST PATIENT: CPT | Mod: S$GLB,,, | Performed by: OPHTHALMOLOGY

## 2018-02-08 RX ORDER — KETOROLAC TROMETHAMINE 5 MG/ML
SOLUTION OPHTHALMIC
Qty: 1 BOTTLE | Refills: 0 | Status: SHIPPED | OUTPATIENT
Start: 2018-02-08 | End: 2018-02-12

## 2018-02-08 NOTE — PROGRESS NOTES
PHYSICAL THERAPY Daily Note    Referring Provider:  Suzan Gregorio    Diagnosis:       ICD-10-CM ICD-9-CM    1. Primary osteoarthritis involving multiple joints M15.0 715.09    2. Chronic left shoulder pain M25.512 719.41     G89.29 338.29             Orders:  Evaluate and Treat    Date of Initial Evaluation: 9-28-17      Visit # 20     SUBJECTIVE: Pt. Reports that she had to run a lot of errands and go to appointments yesterday which wore her shoulders and arms out.  Pt. Reports that she has still been able to sleep better at night.       Current Pain: 4/10 B UE      OBJECTIVE:    TREATMENT PROVIDED:  -Manual Therapy: STM x 8 min. To R proximal brachialis; R shoulder PROM x 7 min.   -Therapeutic Exercise:  N/A  -Modalities: US x 8 min. To R prox. Brachialis, and L post. Shoulder ( 4 min. Each)    .      ASSESSMENT: Pt. Noted to have moderate muscle tension in R prox. Brachialis and lateral triceps.  No muscle tension noted L lateral deltoids or L prox. Brachialis.  Pt. Still able to tolerate PROM L shoulder with L shoulder flexion and abduction range above shoulder level, moderate restriction still noted with L external rotation at neutral, no limitation with internal rotation at neutral.  Pt. Did not want kinesiotape today to see if pain will not increase.          PLAN:  Patient will benefit from physical therapy (1) x/week for (6-8) weeks including manual therapy, therapeutic exercise, functional activities, modalities, and patient education.    Thank you for this referral.    These services are reasonable and necessary for the conditions set forth above while under my care.

## 2018-02-08 NOTE — PROGRESS NOTES
SUBJECTIVE:   Shirley Metz is a 85 y.o. female   Corrected distance visual acuity was 20/60 +2 in the right eye and 20/70 in the left eye.   Chief Complaint   Patient presents with    Glaucoma     Travatan qhs os        HPI:  HPI     Glaucoma    Additional comments: Travatan qhs os           Comments   Patient here for 3 month IOP, patient states she is 100% compliant with   drop usage, patient used systane gel in OS  At 300am and states her vision   in OS  Is still blurry.      1. COAG Goal < 19  SLT OD 3/04 (20 to 15) + 3/11 (19 to 15)  SLT OS 5/05 (20 to 14) +5/11 (18-19 to 15) + 3/12 (min resp.) + 3/11/15   (20.5-17.5)  (Cosopt, Xalatan, and Timolol cause Myalgias)  (Alphagan causes redness) (zioptan too expensive)  2. PCIOL OU  3. Dry AMD  4. Fuch's Dystrophy   DSAEK OD 4/16 Dr. Quintanilla  DSAEK OS 8/15 Dr. Quintanilla    Lotemax qd ou  Systane prn ou  Travatan qhs os       Last edited by RACHELLE Sanchez on 2/8/2018  9:06 AM. (History)        Assessment /Plan :  1. Primary open angle glaucoma of both eyes, indeterminate stage   Doing well, IOP OD within acceptable range relative to target IOP and no evidence of progression. Continue current treatment. Reviewed importance of continued compliance with treatment and follow up.      IOP OS not within acceptable range relative to target IOP with risk of irreversible visual loss. Better IOP control is recommended. Discussed options, risks, and benefits of additional medication, SLT laser, and/or incisional glaucoma surgery. Reviewed importance of continued compliance with treatment and follow up.     Patient chooses schedule SLT  OS     2. Pseudophakia  -- Condition stable, no therapeutic change required. Monitoring routinely.     3. History of cornea transplant followed by    4. Dry eye cont systane qid ou begin Xiidra bid ou

## 2018-02-09 ENCOUNTER — CLINICAL SUPPORT (OUTPATIENT)
Dept: REHABILITATION | Facility: HOSPITAL | Age: 83
End: 2018-02-09
Payer: MEDICARE

## 2018-02-09 DIAGNOSIS — G89.29 CHRONIC LEFT SHOULDER PAIN: ICD-10-CM

## 2018-02-09 DIAGNOSIS — M25.512 CHRONIC LEFT SHOULDER PAIN: ICD-10-CM

## 2018-02-09 DIAGNOSIS — M15.9 PRIMARY OSTEOARTHRITIS INVOLVING MULTIPLE JOINTS: Primary | ICD-10-CM

## 2018-02-09 PROCEDURE — 97035 APP MDLTY 1+ULTRASOUND EA 15: CPT

## 2018-02-09 PROCEDURE — 97140 MANUAL THERAPY 1/> REGIONS: CPT

## 2018-02-12 ENCOUNTER — OFFICE VISIT (OUTPATIENT)
Dept: INTERNAL MEDICINE | Facility: CLINIC | Age: 83
End: 2018-02-12
Payer: MEDICARE

## 2018-02-12 ENCOUNTER — CLINICAL SUPPORT (OUTPATIENT)
Dept: REHABILITATION | Facility: HOSPITAL | Age: 83
End: 2018-02-12
Payer: MEDICARE

## 2018-02-12 VITALS
OXYGEN SATURATION: 95 % | WEIGHT: 174.81 LBS | BODY MASS INDEX: 30.01 KG/M2 | TEMPERATURE: 99 F | SYSTOLIC BLOOD PRESSURE: 148 MMHG | HEART RATE: 62 BPM | DIASTOLIC BLOOD PRESSURE: 58 MMHG

## 2018-02-12 DIAGNOSIS — N18.30 CKD (CHRONIC KIDNEY DISEASE) STAGE 3, GFR 30-59 ML/MIN: ICD-10-CM

## 2018-02-12 DIAGNOSIS — E78.5 HYPERLIPIDEMIA, UNSPECIFIED HYPERLIPIDEMIA TYPE: ICD-10-CM

## 2018-02-12 DIAGNOSIS — I70.0 CALCIFICATION OF AORTA: ICD-10-CM

## 2018-02-12 DIAGNOSIS — M15.9 PRIMARY OSTEOARTHRITIS INVOLVING MULTIPLE JOINTS: Primary | ICD-10-CM

## 2018-02-12 DIAGNOSIS — G89.29 CHRONIC LEFT SHOULDER PAIN: ICD-10-CM

## 2018-02-12 DIAGNOSIS — M25.512 CHRONIC LEFT SHOULDER PAIN: ICD-10-CM

## 2018-02-12 DIAGNOSIS — I10 ESSENTIAL HYPERTENSION: Primary | ICD-10-CM

## 2018-02-12 DIAGNOSIS — I10 UNCONTROLLED HYPERTENSION: ICD-10-CM

## 2018-02-12 PROCEDURE — 1159F MED LIST DOCD IN RCRD: CPT | Mod: S$GLB,,, | Performed by: FAMILY MEDICINE

## 2018-02-12 PROCEDURE — 97035 APP MDLTY 1+ULTRASOUND EA 15: CPT

## 2018-02-12 PROCEDURE — 99999 PR PBB SHADOW E&M-EST. PATIENT-LVL III: CPT | Mod: PBBFAC,,, | Performed by: FAMILY MEDICINE

## 2018-02-12 PROCEDURE — 3008F BODY MASS INDEX DOCD: CPT | Mod: S$GLB,,, | Performed by: FAMILY MEDICINE

## 2018-02-12 PROCEDURE — 99499 UNLISTED E&M SERVICE: CPT | Mod: S$GLB,,, | Performed by: FAMILY MEDICINE

## 2018-02-12 PROCEDURE — 99213 OFFICE O/P EST LOW 20 MIN: CPT | Mod: S$GLB,,, | Performed by: FAMILY MEDICINE

## 2018-02-12 PROCEDURE — 97140 MANUAL THERAPY 1/> REGIONS: CPT

## 2018-02-12 PROCEDURE — 1125F AMNT PAIN NOTED PAIN PRSNT: CPT | Mod: S$GLB,,, | Performed by: FAMILY MEDICINE

## 2018-02-12 RX ORDER — VERAPAMIL HYDROCHLORIDE 120 MG/1
120 CAPSULE, EXTENDED RELEASE ORAL DAILY
COMMUNITY
End: 2018-03-14

## 2018-02-12 NOTE — PROGRESS NOTES
PHYSICAL THERAPY Daily Note    Referring Provider:  Suzan Gregorio    Diagnosis:       ICD-10-CM ICD-9-CM    1. Primary osteoarthritis involving multiple joints M15.0 715.09    2. Chronic left shoulder pain M25.512 719.41     G89.29 338.29             Orders:  Evaluate and Treat    Date of Initial Evaluation: 9-28-17      Visit # 21    SUBJECTIVE: Pt. Reports sore spot on L arm this morning.  Pt. Reports feeling that kinesiotape is helping.  Pt. Reports feeling weakness in L shoulder.    Current Pain: 4/10 B UE      OBJECTIVE:    L wrist strength: 4/5 grossly  L shoulder internal rotation T10    TREATMENT PROVIDED:  -Manual Therapy: STM x 8 min. To B proximal brachialis; L shoulder PROM x 6 min. ; Dry needling to B triceps and B lateral deltoids x 5 min. Performed by Pete Hernandez, PT  -Therapeutic Exercise:  N/A  -Modalities: US x 8 min. To R prox. Brachialis, and L post. Shoulder ( 4 min. Each)  -Kinesiotape applied to mid lateral upper arm to decrease pain with 70% stretch    .      ASSESSMENT: Mild to moderate muscle tension noted R lateral proximal brachialis.  Increased muscle tension noted L lateral deltoid, L proximal brachialis, distal L biceps and L triceps.  Pt. Noted to have increased PROM L shoulder flex, abd, ext. Rotation.  Dry needling performed to B shoulder regions.  Kinesiotape applied over L lateral mid arm region to decrease pain where increase muscle tension occurring.  Pt. Educated on having 6 more visits for PT .  Pt. Educated on exercises B shoulders at least 3 x/ week.  Pt. Educated that we can guide her through the exercises in the clinic.  Pt. Reported that she does exercises at home, but Pt. Reported that its hard to find a time when she can tolerate exercise secondary pain.  Pt. Educated that exercise may be able to help decrease her pain and to try exercises if pain not too intense.      PLAN:  Patient will benefit from physical therapy (1) x/week for (6-8) weeks including manual  therapy, therapeutic exercise, functional activities, modalities, and patient education.    Thank you for this referral.    These services are reasonable and necessary for the conditions set forth above while under my care.

## 2018-02-12 NOTE — PROGRESS NOTES
Subjective:       Patient ID: Shirley Metz is a 85 y.o. female.    Chief Complaint: 8 week f/u    She is here for recheck HTN - at last visit 2 months ago she was to increase her verapamil 120 to BID. Problems with this and we switched her to 180 daily - still problem and a week ago we reduced her to one 120 verapamil again. She sts BP checks are a little better in middle of night when she awakens: 150s-160s. States shoulder are hurting, but in the AM they are hurting worse and her BPs are 170s-180s/ 70s. She has PT for her shoulders. Intake BP is 148/58. My repeat is 162/70.    Her eyes are very dry and she wonders if BP meds are contributing.      Hypertension   This is a recurrent problem. The current episode started more than 1 year ago. The problem has been waxing and waning since onset. The problem is resistant. Associated symptoms include blurred vision, malaise/fatigue and shortness of breath. Pertinent negatives include no anxiety, chest pain, headaches, neck pain, orthopnea, palpitations, peripheral edema, PND or sweats. There are no associated agents to hypertension. Risk factors for coronary artery disease include dyslipidemia, obesity and post-menopausal state. Past treatments include ACE inhibitors, alpha 1 blockers, beta blockers and diuretics. The current treatment provides moderate improvement. Compliance problems include medication side effects.      Review of Systems   Constitutional: Positive for malaise/fatigue.   Eyes: Positive for blurred vision.   Respiratory: Positive for shortness of breath.    Cardiovascular: Negative for chest pain, palpitations, orthopnea and PND.   Musculoskeletal: Negative for neck pain.   Neurological: Negative for headaches.       Objective:      Physical Exam   Constitutional: She is oriented to person, place, and time. She appears well-developed.   HENT:   Head: Normocephalic and atraumatic.   Cardiovascular: Normal rate, regular rhythm and normal heart  sounds.    Pulmonary/Chest: Effort normal and breath sounds normal.   Neurological: She is alert and oriented to person, place, and time.   Skin: Skin is warm and dry.   Psychiatric: She has a normal mood and affect. Her behavior is normal.         Assessment/Plan:     1. Essential hypertension  Ambulatory referral to Endocrinology   2. Calcification of aorta     3. Hyperlipidemia, unspecified hyperlipidemia type  Lipid panel   4. CKD (chronic kidney disease) stage 3, GFR 30-59 ml/min     5. Uncontrolled hypertension  Ambulatory referral to Endocrinology     BP uncontrolled in office on intake, not on my recheck, and not on her checks. Several changes contemplated with pt but prior use of similar meds and cessation due to perceived SEs causes sig problem - no changes today.   Will get a fasting lipid level and consider Zetia.   She will see endo for difficult to control BP.

## 2018-02-13 ENCOUNTER — TELEPHONE (OUTPATIENT)
Dept: CARDIOLOGY | Facility: CLINIC | Age: 83
End: 2018-02-13

## 2018-02-13 NOTE — TELEPHONE ENCOUNTER
Patient refused appt offer with midlevel and cardiologist, stating times inconvenient and have a sick cat in hospital.    Awakes early morning hours around 2 a.m with severe arthritic shoulder and upper arm pain with a head ache.    's-190's, take clonidine 0.1mg , apply warm compress to shoulders, return back to bed, only to awake again at 6a.m and SBP back elevated.    Patient also stated PCP started her on Verapamil,  causing severe constipation, unrelieved with Miralax, blurred vision, dry eyes.  Patient also read clonidine can also cause dry eyes.    Patient again request a call from you.

## 2018-02-13 NOTE — TELEPHONE ENCOUNTER
Please call patient , having serious issues with blood pressure, refused to see other providers.      Request you call her.

## 2018-02-13 NOTE — TELEPHONE ENCOUNTER
I have spoken to patient regarding her BP concerns. She states that her PCP recently increased Verapamil to 180mg, but causing constipation. I have recommend that she continue this dose and try suppositories and daily stool softner

## 2018-02-14 DIAGNOSIS — I10 HYPERTENSION, UNSPECIFIED TYPE: ICD-10-CM

## 2018-02-14 DIAGNOSIS — I10 HYPERTENSION, UNSPECIFIED TYPE: Primary | ICD-10-CM

## 2018-02-14 RX ORDER — CLONIDINE HYDROCHLORIDE 0.1 MG/1
0.1 TABLET ORAL 2 TIMES DAILY PRN
Qty: 60 TABLET | Refills: 3 | Status: ON HOLD | OUTPATIENT
Start: 2018-02-14 | End: 2018-03-16 | Stop reason: HOSPADM

## 2018-02-14 RX ORDER — CLONIDINE HYDROCHLORIDE 0.1 MG/1
0.1 TABLET ORAL 2 TIMES DAILY PRN
Qty: 60 TABLET | Refills: 3 | Status: SHIPPED | OUTPATIENT
Start: 2018-02-14 | End: 2018-02-14 | Stop reason: SDUPTHER

## 2018-02-14 NOTE — TELEPHONE ENCOUNTER
----- Message from Liasndra Nguyen sent at 2/14/2018  1:18 PM CST -----  Contact: Rosemary ( [facility] )  Rosemary ( [facility] ) is requesting in approval on a refill request for clonidine .1 mg.          Please call Rosemary ( [facility] ) [phone]        ..  [facility] - [place] - [address]  [address]  [place] [address]  Phone: [phone] Fax: [phone]

## 2018-02-19 ENCOUNTER — CLINICAL SUPPORT (OUTPATIENT)
Dept: REHABILITATION | Facility: HOSPITAL | Age: 83
End: 2018-02-19
Payer: MEDICARE

## 2018-02-19 ENCOUNTER — CLINICAL SUPPORT (OUTPATIENT)
Dept: INTERNAL MEDICINE | Facility: CLINIC | Age: 83
End: 2018-02-19
Payer: MEDICARE

## 2018-02-19 DIAGNOSIS — E78.5 HYPERLIPIDEMIA, UNSPECIFIED HYPERLIPIDEMIA TYPE: ICD-10-CM

## 2018-02-19 DIAGNOSIS — G89.29 CHRONIC LEFT SHOULDER PAIN: ICD-10-CM

## 2018-02-19 DIAGNOSIS — M25.512 CHRONIC LEFT SHOULDER PAIN: ICD-10-CM

## 2018-02-19 DIAGNOSIS — M15.9 PRIMARY OSTEOARTHRITIS INVOLVING MULTIPLE JOINTS: Primary | ICD-10-CM

## 2018-02-19 LAB
CHOLEST SERPL-MCNC: 181 MG/DL
CHOLEST/HDLC SERPL: 4 {RATIO}
HDLC SERPL-MCNC: 45 MG/DL
HDLC SERPL: 24.9 %
LDLC SERPL CALC-MCNC: 118.2 MG/DL
NONHDLC SERPL-MCNC: 136 MG/DL
TRIGL SERPL-MCNC: 89 MG/DL

## 2018-02-19 PROCEDURE — 99999 PR PBB SHADOW E&M-EST. PATIENT-LVL I: CPT | Mod: PBBFAC,,,

## 2018-02-19 PROCEDURE — 97140 MANUAL THERAPY 1/> REGIONS: CPT

## 2018-02-19 PROCEDURE — G8986 CARRY D/C STATUS: HCPCS | Mod: CK

## 2018-02-19 PROCEDURE — 80061 LIPID PANEL: CPT

## 2018-02-19 PROCEDURE — 36415 COLL VENOUS BLD VENIPUNCTURE: CPT | Mod: S$GLB,,, | Performed by: FAMILY MEDICINE

## 2018-02-19 PROCEDURE — G8985 CARRY GOAL STATUS: HCPCS | Mod: CI

## 2018-02-19 PROCEDURE — G8984 CARRY CURRENT STATUS: HCPCS | Mod: CK

## 2018-02-19 NOTE — PLAN OF CARE
PHYSICAL THERAPY Treatment/ Discharge    Referring Provider:  Suzan Gregorio    Diagnosis:       ICD-10-CM ICD-9-CM    1. Primary osteoarthritis involving multiple joints M15.0 715.09    2. Chronic left shoulder pain M25.512 719.41     G89.29 338.29             Orders:  Evaluate and Treat    Date of Initial Evaluation: 9-28-17      Visit # 22    SUBJECTIVE:  Pt. Reports pain continues and feels that  physical therapy is no longer helping.  Pt. Reports pain with driving and lifting walker.    Current Pain: 6/10 B UE      OBJECTIVE:    Sensation:  B UE intact            10-26-17   12-5-17    1-3-17               2-1-18         Evaluation   Reeval   Re-Eval    Re-Eval              Re-Eval        R  L   R  L   R  L   R  L        R L   Shoulder A/PROM: Flexion    113  93   123  120  120P!  97P!   122  110       140        110     Extension  57  40   WnL  WNL  WNL P! WNL   WNL  WNL     WNL      WNL     Abduction  130  100   115  105  130click 97   140  120       138         95      Adduction-(hor.) 30  15   20  35  30 P!  35   12             10             23          20     Internal Rotation 63  59   45  65  65  WNL Neutral  45-ABD 72   74         18abd      External Rotation 70  63 'click' per pt. 85  63  70  30 grinding Neutral 95  45                     90   18neu,40abd       Discharge     R L             Flexion  132     105  Abduction  144 85  External rot WNL    33 neutral  Internal rot  WNL     WNL        Strength:                10-26-17   12-5-17   1-3-17   2-1-18           Evaluation   Re-Eval   Re-Eval   Re-Eval  Re-Eval         R  L   R  L  R  L  R L        R L     Supraspinatus   1+/5  1+/5   1+/5  1+/5  1+/5  1+/5  1+/5    1+/5              1+/5       1+/5     Subscapularis   1+/5  1+/5   1+/5  1+/5  1+/5  1+/5  3+/5    1+/5 4/5         1+/5      1+/5     Latissimus dorsi   4/5  1/5   4/5  1/5  3+/5  1+/5  3+/5    1+/5   4+/5      1+/5         Rhomboids   4/5  4/5   4/5  4/5  3+/5  4/5  4/5    4/5       4/5          4/5      Upper Trapezius  4/5  4/5   4/5  4/5  5/5  5/5  5/5    5/5               5/5          5/5      Middle Trapezius  4/5  4/5   4/5  4/5  3+/5  3+/5  3+/5    3+/5    3+/5       3+/5     Lower Trapezius  4/5  4/5   4/5  4/5  3+/5  1+/5  3+/5    1+/5    4+/5       1+/5     Levator scapula  4/5  4/5   4/5  4/5  4/5  4/5  4/5     4/5                  5/5      3+/5     Biceps    4/5  3+/5   4+/5  4+/5  4/5  4/5  5/5           5/5       5/5          4/5     Triceps   4/5  3+/5   4/5  4/5  4/5  4/5  5/5     5/5        5/5          4/5     shld flexors   1+/5  1+/5   1+/5  1+/5  1+/5  1+/5  3+/5     3+/5                 4/5          1+/5     Extensors   4/5  4/5   5/5  5/5  3+/5  4/5  4/5     4/5        4/5          1+/5     Abductors   1+/5  1+/5   1+/5  1+/5  1+/5  1+/5  1+/5    1+/5      4/5           1+/5    Discharge Strength: R:L no change         R  L   R  L  R  L  R L       R L  Special test:  NEER     -  +   -  -  -  -  -    -         -   +      Moon sukhdeep   -  +   -  +  -  -  -    -   -              +      Drop arm    -  +   -  -  -  -  -    -                                -              +    Empty can    -  -   -  -  -  -  -    -                                -              +      Int. Rot. Functional Reach     T6  S1   T7  T12  T7  L1  T6    T10   T6           T10     Discharge: Internal rotation functional reach R:L  T8: S5    GHJ mobility: Initial Eval-crepitus noted with L external and L internal rotation      Tenderness to palpation:  L upper trapezius, L lateral suprapinatus fossa; Re-Eval 12-5-17 no tenderness         Function: Patient reports 39% disability based on score of the Upper Extremity Functional Scale on eval 10-2-17    Patient reports 71% disability based on score of the Upper Extremity Functional Scale on eval 12-5-17    Patient reports 47% disability based on score of the Upper Extremity Functional Scale on eval 1-3-18    Patient reports 52.3% disability based on score of  the Upper Extremity Functional Scale on eval 2-1-18    Patient reports 54.5 disability based on score of the Upper Extremity Functional Scale on eval 2-19-18    Functional Limitations and Goal:  This patient's primary Physical Therapy goal is to return to their prior level of function (Carryiing [id number]) without limitations.  The patient's current level of impairment is 40-60  percent impaired (CK) based on their score of 54.5 percent impaired on the Quick DASH Shoulder Questionnaire.        ASSESSMENT: Pt.'s pain level increased from last visit.  Pt. Has reached maximum B shoulder AROM and maintained it this past month.  Pt. Still mildly to significantly weak in B shoulder musculature.  Pt. Has tried dry needling, strengthening, ROM, kinesiotape and not found any continued pain relief.  Pt. Educated that dry needling may need to be tried more than once but she did not want to try it anymore.  Pt. Had increased muscle tension in L upper trapezius so US and STM performed to area to help increase pt. Comfort.       Short Term Goals:  1-4 weeks  1. I with HEP MET  2. 75% R Shoulder AROM Not Met  3. Decrease pain B shoulders to less than 4/10 MET     Long Term Goals: 6-8 weeks  1. Return to activities with pain less than 2/10 Not Met  2.Pt. To have full R shoulder AROM to don/ doff shirt with ease Not Met  3. Increase R shoulder strength to 5/5 to lift groceries and moderate size objects. Not Met  4. Pt. To have 75% L shoulder AROM to lift objects from overhead cabinets. Not Met  4. Increase L shoulder functional internal rotation reach to T6 to bathe opposite side of body. Not Met    TREATMENT PROVIDED:  -Manual Therapy: STM x 8 minutes to L upper trapezius  -Therapeutic Exercise:  N/A  -Modalities: US x 8 min. To L upper trapezius        PLAN:  Patient discharged from skilled PT .  Thank you for this referral.    These services are reasonable and necessary for the conditions set forth above while under my care.

## 2018-03-11 ENCOUNTER — PATIENT MESSAGE (OUTPATIENT)
Dept: DERMATOLOGY | Facility: CLINIC | Age: 83
End: 2018-03-11

## 2018-03-11 ENCOUNTER — PATIENT MESSAGE (OUTPATIENT)
Dept: CARDIOLOGY | Facility: CLINIC | Age: 83
End: 2018-03-11

## 2018-03-12 ENCOUNTER — PATIENT MESSAGE (OUTPATIENT)
Dept: CARDIOLOGY | Facility: CLINIC | Age: 83
End: 2018-03-12

## 2018-03-12 NOTE — TELEPHONE ENCOUNTER
Recommend that patient be seen to discuss further and for possible med adjustments. She needs to bring all meds to clinic visit

## 2018-03-14 ENCOUNTER — OFFICE VISIT (OUTPATIENT)
Dept: CARDIOLOGY | Facility: CLINIC | Age: 83
End: 2018-03-14
Payer: MEDICARE

## 2018-03-14 VITALS
SYSTOLIC BLOOD PRESSURE: 130 MMHG | DIASTOLIC BLOOD PRESSURE: 54 MMHG | BODY MASS INDEX: 30.41 KG/M2 | HEIGHT: 64 IN | WEIGHT: 178.13 LBS | HEART RATE: 58 BPM

## 2018-03-14 DIAGNOSIS — E66.9 OBESITY (BMI 30.0-34.9): ICD-10-CM

## 2018-03-14 DIAGNOSIS — I11.9 HYPERTENSIVE LEFT VENTRICULAR HYPERTROPHY, WITHOUT HEART FAILURE: Chronic | ICD-10-CM

## 2018-03-14 DIAGNOSIS — I10 ESSENTIAL HYPERTENSION: Primary | Chronic | ICD-10-CM

## 2018-03-14 DIAGNOSIS — N18.30 CHRONIC KIDNEY DISEASE, STAGE 3: ICD-10-CM

## 2018-03-14 DIAGNOSIS — R00.2 PALPITATIONS: ICD-10-CM

## 2018-03-14 DIAGNOSIS — E78.5 HYPERLIPIDEMIA, UNSPECIFIED HYPERLIPIDEMIA TYPE: Chronic | ICD-10-CM

## 2018-03-14 DIAGNOSIS — R73.03 PRE-DIABETES: ICD-10-CM

## 2018-03-14 PROCEDURE — 99213 OFFICE O/P EST LOW 20 MIN: CPT | Mod: S$GLB,,, | Performed by: PHYSICIAN ASSISTANT

## 2018-03-14 PROCEDURE — 3078F DIAST BP <80 MM HG: CPT | Mod: CPTII,S$GLB,, | Performed by: PHYSICIAN ASSISTANT

## 2018-03-14 PROCEDURE — 99999 PR PBB SHADOW E&M-EST. PATIENT-LVL IV: CPT | Mod: PBBFAC,,, | Performed by: PHYSICIAN ASSISTANT

## 2018-03-14 PROCEDURE — 3075F SYST BP GE 130 - 139MM HG: CPT | Mod: CPTII,S$GLB,, | Performed by: PHYSICIAN ASSISTANT

## 2018-03-14 PROCEDURE — 99499 UNLISTED E&M SERVICE: CPT | Mod: S$GLB,,, | Performed by: PHYSICIAN ASSISTANT

## 2018-03-14 RX ORDER — METOPROLOL SUCCINATE 25 MG/1
50 TABLET, EXTENDED RELEASE ORAL 2 TIMES DAILY
Qty: 180 TABLET | Refills: 3 | Status: SHIPPED | OUTPATIENT
Start: 2018-03-14 | End: 2018-05-15 | Stop reason: SDUPTHER

## 2018-03-14 RX ORDER — VERAPAMIL HYDROCHLORIDE 180 MG/1
CAPSULE, EXTENDED RELEASE ORAL
COMMUNITY
Start: 2018-03-02 | End: 2018-03-14 | Stop reason: SINTOL

## 2018-03-14 NOTE — PROGRESS NOTES
Subjective:    Patient ID:  Shirley Metz is a 85 y.o. female who presents for follow-up of HTN    HPI   Ms. Flower is an 85 year old female patient with a PMHx of HTN, hyperlipidemia, palpitations who presents today for follow-up. Patient returns today and states she is doing ok. Her main complaint today is BP fluctuations that she attributes to bilateral shoulder and arm pain. Per patient, she injured her shoulder in October and has been having issues since that time with arthritis. Pain awakens her at night and goes down into her arms. Movement will often exacerbate the pain, but not always. Tylenol with sometimes help. She has seen rheumatology, declined steroid injection. Cardiac wise, seems to be doing ok. No increased SOB, chest pain, or chest tightness. No palpitations, lightheadedness, dizziness, near syncope, or syncope. Does feel weak when her BP drops. She reports compliance with her medications. Mindful of her salt intake. BP ok today in office but still having spikes at home. She would like to d/c Verapamil due to dry eyes and intolerable constipation. Patient is very sensitive to medications and titration/adjustment has been difficult in the past.     Review of Systems   Constitution: Positive for weakness and malaise/fatigue. Negative for chills, decreased appetite and fever.   HENT: Negative for congestion, hoarse voice and sore throat.    Eyes: Negative for blurred vision and discharge.   Cardiovascular: Negative for chest pain, claudication, cyanosis, dyspnea on exertion, irregular heartbeat, leg swelling, near-syncope, orthopnea, palpitations and paroxysmal nocturnal dyspnea.   Respiratory: Negative for cough, hemoptysis, shortness of breath, snoring, sputum production and wheezing.    Endocrine: Negative for cold intolerance and heat intolerance.   Hematologic/Lymphatic: Negative for bleeding problem. Does not bruise/bleed easily.   Skin: Negative for rash.   Musculoskeletal:  "Positive for arthritis, joint pain and myalgias. Negative for back pain, joint swelling, muscle cramps and muscle weakness.   Gastrointestinal: Negative for abdominal pain, constipation, diarrhea, heartburn, melena and nausea.   Genitourinary: Negative for hematuria.   Neurological: Negative for dizziness, focal weakness, headaches, light-headedness, loss of balance, numbness, paresthesias and seizures.   Psychiatric/Behavioral: Negative for memory loss. The patient does not have insomnia.    Allergic/Immunologic: Negative for hives.       BP (!) 130/54   Pulse (!) 58 Comment: radial  Ht 5' 4" (1.626 m)   Wt 80.8 kg (178 lb 2.1 oz)   BMI 30.58 kg/m²   Pulse upon recheck 62 in normal range  Objective:    Physical Exam   Constitutional: She is oriented to person, place, and time. She appears well-developed and well-nourished. No distress.   HENT:   Head: Normocephalic and atraumatic.   Eyes: Pupils are equal, round, and reactive to light. Right eye exhibits no discharge. Left eye exhibits no discharge.   Neck: Neck supple. No JVD present.   Cardiovascular: Normal rate, regular rhythm, S1 normal, S2 normal and normal heart sounds.    No murmur heard.  Pulmonary/Chest: Effort normal and breath sounds normal. No respiratory distress. She has no wheezes. She has no rales.   Abdominal: Soft. Bowel sounds are normal. She exhibits no distension. There is no tenderness. There is no rebound.   Musculoskeletal: She exhibits no edema.   Neurological: She is alert and oriented to person, place, and time.   Skin: Skin is warm and dry. She is not diaphoretic. No erythema.   Psychiatric: She has a normal mood and affect. Her behavior is normal. Thought content normal.   Nursing note and vitals reviewed.        Impression: NORMAL MYOCARDIAL PERFUSION  1. The perfusion scan is free of evidence for myocardial ischemia or injury.   2. Resting wall motion is physiologic.   3. Resting LV function is normal.   4. The ventricular " volumes are normal at rest and stress.   5. The extracardiac distribution of radioactivity is normal.   Assessment:       1. Essential hypertension    2. Hyperlipidemia, unspecified hyperlipidemia type    3. Palpitations    4. Obesity (BMI 30.0-34.9)    5. Pre-diabetes    6. Chronic kidney disease, stage 3    7. Hypertensive left ventricular hypertrophy, without heart failure       Doing ok, still having issues with fluctuating BP's, wants to stop Verapamil. Limited with therapy due to side effects/med intolerances. Will try increasing Toprol Xl to 50 mg BID. Patient's pulse has been in normal range, 62 on my recheck in office. She will monitor at home and let me know if any low readings. Continue other meds for now. Consider re-adding back HCTZ to regimen.   Plan:    -Stop Verapamil; increase Metoprolol to 50 mg BID  -Continue other meds  -Cardiac low salt diet  -Consider adding HCTZ  -RTC 2 weeks for re-check  -Monitor at home    Chart reviewed. Dr. Ya agrees with plan as outlined above.

## 2018-03-15 ENCOUNTER — HOSPITAL ENCOUNTER (OUTPATIENT)
Facility: HOSPITAL | Age: 83
Discharge: HOME | End: 2018-03-16
Attending: HOSPITALIST | Admitting: HOSPITALIST
Payer: MEDICARE

## 2018-03-15 ENCOUNTER — TELEPHONE (OUTPATIENT)
Dept: CARDIOLOGY | Facility: CLINIC | Age: 83
End: 2018-03-15

## 2018-03-15 DIAGNOSIS — I10 ESSENTIAL HYPERTENSION: Primary | ICD-10-CM

## 2018-03-15 DIAGNOSIS — I10 ACCELERATED HYPERTENSION: ICD-10-CM

## 2018-03-15 DIAGNOSIS — R79.89 ELEVATED TROPONIN: ICD-10-CM

## 2018-03-15 DIAGNOSIS — I10 HTN (HYPERTENSION): ICD-10-CM

## 2018-03-15 DIAGNOSIS — I16.0 HYPERTENSIVE URGENCY: Primary | ICD-10-CM

## 2018-03-15 LAB
ALBUMIN SERPL BCP-MCNC: 3.8 G/DL
ALP SERPL-CCNC: 62 U/L
ALT SERPL W/O P-5'-P-CCNC: 18 U/L
ANION GAP SERPL CALC-SCNC: 9 MMOL/L
AST SERPL-CCNC: 18 U/L
BASOPHILS # BLD AUTO: 0.02 K/UL
BASOPHILS NFR BLD: 0.3 %
BILIRUB SERPL-MCNC: 0.7 MG/DL
BNP SERPL-MCNC: 454 PG/ML
BUN SERPL-MCNC: 27 MG/DL
CALCIUM SERPL-MCNC: 10 MG/DL
CHLORIDE SERPL-SCNC: 106 MMOL/L
CK MB SERPL-MCNC: 1.3 NG/ML
CK MB SERPL-RTO: 2.7 %
CK SERPL-CCNC: 49 U/L
CO2 SERPL-SCNC: 26 MMOL/L
CREAT SERPL-MCNC: 1.1 MG/DL
DIFFERENTIAL METHOD: ABNORMAL
EOSINOPHIL # BLD AUTO: 0.2 K/UL
EOSINOPHIL NFR BLD: 2.9 %
ERYTHROCYTE [DISTWIDTH] IN BLOOD BY AUTOMATED COUNT: 13.9 %
EST. GFR  (AFRICAN AMERICAN): 53 ML/MIN/1.73 M^2
EST. GFR  (NON AFRICAN AMERICAN): 46 ML/MIN/1.73 M^2
GLUCOSE SERPL-MCNC: 105 MG/DL
HCT VFR BLD AUTO: 35.6 %
HGB BLD-MCNC: 11.8 G/DL
LYMPHOCYTES # BLD AUTO: 1.7 K/UL
LYMPHOCYTES NFR BLD: 27.2 %
MAGNESIUM SERPL-MCNC: 2.2 MG/DL
MCH RBC QN AUTO: 31.6 PG
MCHC RBC AUTO-ENTMCNC: 33.1 G/DL
MCV RBC AUTO: 95 FL
MONOCYTES # BLD AUTO: 0.8 K/UL
MONOCYTES NFR BLD: 12.5 %
NEUTROPHILS # BLD AUTO: 3.5 K/UL
NEUTROPHILS NFR BLD: 57.1 %
PLATELET # BLD AUTO: 198 K/UL
PMV BLD AUTO: 9.8 FL
POTASSIUM SERPL-SCNC: 4.1 MMOL/L
PROT SERPL-MCNC: 6.9 G/DL
RBC # BLD AUTO: 3.74 M/UL
SODIUM SERPL-SCNC: 141 MMOL/L
TROPONIN I SERPL DL<=0.01 NG/ML-MCNC: 0.03 NG/ML
TROPONIN I SERPL DL<=0.01 NG/ML-MCNC: 0.06 NG/ML
WBC # BLD AUTO: 6.15 K/UL

## 2018-03-15 PROCEDURE — 84484 ASSAY OF TROPONIN QUANT: CPT

## 2018-03-15 PROCEDURE — 99284 EMERGENCY DEPT VISIT MOD MDM: CPT

## 2018-03-15 PROCEDURE — 25000003 PHARM REV CODE 250: Performed by: HOSPITALIST

## 2018-03-15 PROCEDURE — 82550 ASSAY OF CK (CPK): CPT

## 2018-03-15 PROCEDURE — 83880 ASSAY OF NATRIURETIC PEPTIDE: CPT

## 2018-03-15 PROCEDURE — G0378 HOSPITAL OBSERVATION PER HR: HCPCS

## 2018-03-15 PROCEDURE — 83735 ASSAY OF MAGNESIUM: CPT

## 2018-03-15 PROCEDURE — 85025 COMPLETE CBC W/AUTO DIFF WBC: CPT

## 2018-03-15 PROCEDURE — 36415 COLL VENOUS BLD VENIPUNCTURE: CPT

## 2018-03-15 PROCEDURE — 82553 CREATINE MB FRACTION: CPT

## 2018-03-15 PROCEDURE — 93010 ELECTROCARDIOGRAM REPORT: CPT | Mod: 76,,, | Performed by: INTERNAL MEDICINE

## 2018-03-15 PROCEDURE — 80053 COMPREHEN METABOLIC PANEL: CPT

## 2018-03-15 PROCEDURE — 93010 ELECTROCARDIOGRAM REPORT: CPT | Mod: ,,, | Performed by: INTERNAL MEDICINE

## 2018-03-15 RX ORDER — TRIAMTERENE AND HYDROCHLOROTHIAZIDE 37.5; 25 MG/1; MG/1
1 CAPSULE ORAL EVERY MORNING
Qty: 30 CAPSULE | Refills: 11 | Status: SHIPPED | OUTPATIENT
Start: 2018-03-15 | End: 2018-05-01

## 2018-03-15 RX ORDER — TRIAMTERENE AND HYDROCHLOROTHIAZIDE 37.5; 25 MG/1; MG/1
1 CAPSULE ORAL EVERY MORNING
Status: DISCONTINUED | OUTPATIENT
Start: 2018-03-15 | End: 2018-03-16

## 2018-03-15 RX ORDER — ACETAMINOPHEN 325 MG/1
650 TABLET ORAL EVERY 6 HOURS PRN
Status: DISCONTINUED | OUTPATIENT
Start: 2018-03-15 | End: 2018-03-16 | Stop reason: HOSPADM

## 2018-03-15 RX ORDER — ASPIRIN 81 MG/1
81 TABLET ORAL DAILY
Status: DISCONTINUED | OUTPATIENT
Start: 2018-03-16 | End: 2018-03-16 | Stop reason: HOSPADM

## 2018-03-15 RX ORDER — NITROGLYCERIN 0.4 MG/1
0.4 TABLET SUBLINGUAL EVERY 5 MIN PRN
Status: DISCONTINUED | OUTPATIENT
Start: 2018-03-15 | End: 2018-03-16 | Stop reason: HOSPADM

## 2018-03-15 RX ORDER — METOPROLOL SUCCINATE 50 MG/1
50 TABLET, EXTENDED RELEASE ORAL 2 TIMES DAILY
Status: DISCONTINUED | OUTPATIENT
Start: 2018-03-15 | End: 2018-03-16

## 2018-03-15 RX ORDER — TRAVOPROST OPHTHALMIC SOLUTION 0.04 MG/ML
1 SOLUTION OPHTHALMIC NIGHTLY
Status: DISCONTINUED | OUTPATIENT
Start: 2018-03-16 | End: 2018-03-16 | Stop reason: HOSPADM

## 2018-03-15 RX ORDER — ASPIRIN 81 MG/1
81 TABLET ORAL DAILY
Status: DISCONTINUED | OUTPATIENT
Start: 2018-03-15 | End: 2018-03-15

## 2018-03-15 RX ORDER — ONDANSETRON 2 MG/ML
4 INJECTION INTRAMUSCULAR; INTRAVENOUS EVERY 6 HOURS PRN
Status: DISCONTINUED | OUTPATIENT
Start: 2018-03-15 | End: 2018-03-16 | Stop reason: HOSPADM

## 2018-03-15 RX ORDER — ALPRAZOLAM 0.5 MG/1
0.5 TABLET ORAL 2 TIMES DAILY PRN
Status: DISCONTINUED | OUTPATIENT
Start: 2018-03-15 | End: 2018-03-16 | Stop reason: HOSPADM

## 2018-03-15 RX ORDER — RAMELTEON 8 MG/1
8 TABLET ORAL NIGHTLY PRN
Status: DISCONTINUED | OUTPATIENT
Start: 2018-03-15 | End: 2018-03-16 | Stop reason: HOSPADM

## 2018-03-15 RX ORDER — LOSARTAN POTASSIUM 50 MG/1
50 TABLET ORAL 2 TIMES DAILY
Status: DISCONTINUED | OUTPATIENT
Start: 2018-03-15 | End: 2018-03-16

## 2018-03-15 RX ORDER — ENOXAPARIN SODIUM 100 MG/ML
40 INJECTION SUBCUTANEOUS EVERY 24 HOURS
Status: DISCONTINUED | OUTPATIENT
Start: 2018-03-16 | End: 2018-03-16 | Stop reason: HOSPADM

## 2018-03-15 RX ORDER — ASPIRIN 325 MG
325 TABLET ORAL ONCE
Status: COMPLETED | OUTPATIENT
Start: 2018-03-16 | End: 2018-03-15

## 2018-03-15 RX ADMIN — LOSARTAN POTASSIUM 50 MG: 50 TABLET, FILM COATED ORAL at 11:03

## 2018-03-15 RX ADMIN — METOPROLOL SUCCINATE 50 MG: 50 TABLET, EXTENDED RELEASE ORAL at 11:03

## 2018-03-15 RX ADMIN — ALPRAZOLAM 0.5 MG: 0.5 TABLET ORAL at 11:03

## 2018-03-15 RX ADMIN — ASPIRIN 325 MG ORAL TABLET 325 MG: 325 PILL ORAL at 11:03

## 2018-03-15 RX ADMIN — TRIAMTERENE AND HYDROCHLOROTHIAZIDE 1 CAPSULE: 25; 37.5 CAPSULE ORAL at 11:03

## 2018-03-15 NOTE — TELEPHONE ENCOUNTER
I spoke with patient. BP high at time of call 202/82. She has taken extra clonidine doses close together without improvement and may have element of rebound HTN. Sent to ED for further evaluation.    Advised to stay off Verapamil. Start dyazide once d/c'd from ED. Script sent to pharmacy

## 2018-03-15 NOTE — TELEPHONE ENCOUNTER
Pt states since Jo-Ann increased medication metoprolol to 50 mg twice daily states she d/c medication verapamil. Pt states she hasn't taking verapamil since Wednesday morning.    Pt states her bp has been elevated since change    3/14/18  Early am- 156/54  Pt states took clonidine around 7:30pm  11:00pm after med- 180/58  1:30am- 190/85 took another clonidine    3/15/18  6:00am 187/79- took clonidine and morning medication  Pt states she didn't take bp until just now at 2:20pm 188/68.     Pt denies any other symptoms.

## 2018-03-15 NOTE — PROGRESS NOTES
I spoke with patient. BP still elevated, she has take 3.5 total doses of Clonidine today and may have a component of rebound HTN due to close doses. BP at time of phone call was 205/82. Given persistent elevation, ED evaluation warranted. Patient agreed with recommendations.    Recommendations would be to re-start

## 2018-03-15 NOTE — ED PROVIDER NOTES
"SCRIBE #1 NOTE: I, Jennifer Iraheta, am scribing for, and in the presence of, JERARDO Lott. I have scribed the entire note.      History      Chief Complaint   Patient presents with    Hypertension     " My B/P has been really bad yesterday and today"       Review of patient's allergies indicates:   Allergen Reactions    Claritin [loratadine] Other (See Comments)     Double vision/spots    Hydrocodone-acetaminophen      wheezing    Naproxen      wheezing    Vibramycin [doxycycline calcium] Other (See Comments)     Weakness nausea   sob    Amlodipine      Myalgias      Coreg [carvedilol] Swelling     lips    Fenofibrate      Causes muscle weakness    Hydralazine analogues      Muscle tremors/aches      Isosorbide     Lasix [furosemide]      myalgias    Moxifloxacin Other (See Comments)     Hives   muscle soreness    Timolol     Allegra [fexofenadine] Other (See Comments)     Double vision/spots     Codeine Diarrhea and Other (See Comments)     Loss of balance     Erythromycin Other (See Comments)     Complete weakness/could not walk    Hydrocortisone (bulk) Other (See Comments)     Only suppository/ caused muscle weakness    Macrolide antibiotics Other (See Comments)     Complete weakness/could not walk    Patanol [olopatadine] Other (See Comments)     Muscle weakness    Prednisone Anxiety    Statins-hmg-coa reductase inhibitors Other (See Comments)     Muscle weakness    Xalatan [latanoprost] Other (See Comments)     Muscle weakness        HPI   HPI    3/15/2018, 6:32 PM   History obtained from the patient      History of Present Illness: Shirley Metz is a 85 y.o. female patient with PMHx of HTN who presents to the Emergency Department for persistent hypertension which onset gradually yesterday morning, despite reported compliance with medications. Symptoms are intermittent and moderate in severity. Associated sxs include pain in arms and irritability. Patient denies any HA , CP " , SOB , N/V , blurry vision, dizziness, palpitations, leg swelling, and all other sxs at this time. Patient reports that she has taken 3 doses of clonidine since last night. Was told by cardiologist to seek evaluation in the ED d/t persistently elevated BP.  Pt is compliant with daily medications and low sodium diet. Pt also reports that she has recently been taken off of verapamil d/t side effects of dry mouth, dry eyes, and constipation. No further complaints or concerns at this time.         Arrival mode: Personal vehicle     PCP: Yandy Terrell MD       Past Medical History:  Past Medical History:   Diagnosis Date    Anxiety 11/28/2017    Arthritis     Back pain     Colon polyps     reported per patient.     Fuchs' corneal dystrophy     Glaucoma     Hyperlipidemia     Hypertension     Macular degeneration dry    Obesity     Trouble in sleeping     Urinary tract infection        Past Surgical History:  Past Surgical History:   Procedure Laterality Date    ADENOIDECTOMY  1938    BREAST CYST EXCISION Left 1997 approx    CARPAL TUNNEL RELEASE Right 2012 approx    CATARACT EXTRACTION Bilateral 1994    CHOLECYSTECTOMY  1975    CORNEAL TRANSPLANT Bilateral 08/2015 8/2015 - left; Right 4/2016    EYE SURGERY      Fuch's Corneal dystrophy - had 2nd operation with Dr. Quintanilla    EYE SURGERY      Cataract wIOL    left thumb Left 2011    removed cuboid for arthritis    SLT- OS (aka TRABECULOPLASTY) Left 05/11/2011    repeat 3/14/12    TONSILLECTOMY  1938         Family History:  Family History   Problem Relation Age of Onset    Heart disease Mother     Hypertension Mother     Cancer Father 88     sarcoma( ?Kaposi's ) on leg    Deep vein thrombosis Neg Hx     Ovarian cancer Neg Hx     Breast cancer Neg Hx     Kidney disease Neg Hx     Strabismus Neg Hx     Retinal detachment Neg Hx     Macular degeneration Neg Hx     Glaucoma Neg Hx     Blindness Neg Hx     Amblyopia Neg Hx     Eczema  Neg Hx     Lupus Neg Hx     Melanoma Neg Hx     Psoriasis Neg Hx     Diabetes Neg Hx     Stroke Neg Hx     Mental retardation Neg Hx     Mental illness Neg Hx     Hyperlipidemia Neg Hx     COPD Neg Hx     Asthma Neg Hx     Depression Neg Hx     Alcohol abuse Neg Hx     Drug abuse Neg Hx        Social History:  Social History     Social History Main Topics    Smoking status: Never Smoker    Smokeless tobacco: Never Used      Comment: history of passive smoking from her ex-    Alcohol use 1.2 oz/week     2 Standard drinks or equivalent per week      Comment: occasional     Drug use: No    Sexual activity: Not Currently     Partners: Male     Birth control/ protection: Post-menopausal      Comment: discussed protection for STD's       ROS   Review of Systems   Constitutional: Negative for chills, diaphoresis and fever.   HENT: Negative for congestion, rhinorrhea and sore throat.    Eyes: Negative for visual disturbance.   Respiratory: Negative for cough and shortness of breath.    Cardiovascular: Negative for chest pain, palpitations and leg swelling.   Gastrointestinal: Negative for abdominal pain, blood in stool, constipation, nausea and vomiting.   Genitourinary: Negative for dysuria and hematuria.   Musculoskeletal: Negative for neck pain and neck stiffness.        (+) bilateral upper arm pain   Neurological: Negative for dizziness, numbness and headaches.   Psychiatric/Behavioral:        +irritability        Physical Exam      Initial Vitals [03/15/18 1804]   BP Pulse Resp Temp SpO2   (!) 183/67 60 18 98.1 °F (36.7 °C) 95 %      MAP       105.67          Physical Exam  Nursing Notes and Vital Signs Reviewed.  Constitutional:  Well-developed and well-nourished.  Head: Atraumatic. Normocephalic.  Eyes: PERRL. EOM intact. Conjunctivae are not pale. No scleral icterus.  ENT: Mucous membranes are moist. Oropharynx is clear and symmetric.    Neck: Supple. Full ROM. No  lymphadenopathy.  Cardiovascular: Regular rate. Regular rhythm. No murmurs, rubs, or gallops. Distal pulses are 2+ and symmetric.  Pulmonary/Chest: No respiratory distress. Clear to auscultation bilaterally. No wheezing or rales.  Abdominal: Soft and non-distended.  There is no tenderness.  No rebound, guarding, or rigidity. Good bowel sounds.  Musculoskeletal: Moves all extremities. No obvious deformities. No edema. No calf tenderness.  Skin: Warm and dry.  Neurological:  Alert, awake, and appropriate.  Normal speech.  No acute focal neurological deficits are appreciated.  Psychiatric: Normal affect. Good eye contact. Appropriate in content.    ED Course    Procedures  ED Vital Signs:  Vitals:    03/15/18 2046 03/15/18 2048 03/15/18 2217 03/15/18 2232   BP: (!) 188/86  (!) 186/78 (!) 190/83   Pulse:  64 61 65   Resp:  19  16   Temp:    98.2 °F (36.8 °C)   TempSrc:    Oral   SpO2:  97% 96% 97%   Weight:       Height:        03/15/18 2246 03/16/18 0001 03/16/18 0101 03/16/18 0156   BP:  (!) 206/87 (!) 172/77 (!) 109/56   Pulse: 62 66 66 61   Resp:  16     Temp:  97.9 °F (36.6 °C)     TempSrc:  Oral     SpO2:  95%     Weight:       Height:        03/16/18 0329 03/16/18 0728 03/16/18 0920 03/16/18 1045   BP: (!) 125/58 (!) 181/76  (!) 192/74   Pulse: 60 (!) 54 62    Resp: 16 18     Temp: 98.2 °F (36.8 °C) 96.3 °F (35.7 °C)     TempSrc: Oral Oral     SpO2: (!) 94% 95%     Weight:       Height:        03/16/18 1108 03/16/18 1217 03/16/18 1235   BP: (!) 192/91 (!) 192/74 (!) 172/82   Pulse: 62 61    Resp: 18 18    Temp: 98.7 °F (37.1 °C)     TempSrc: Oral     SpO2: 96% 96%    Weight:      Height:          Abnormal Lab Results:  Labs Reviewed   CBC W/ AUTO DIFFERENTIAL - Abnormal; Notable for the following:        Result Value    RBC 3.74 (*)     Hemoglobin 11.8 (*)     Hematocrit 35.6 (*)     MCH 31.6 (*)     All other components within normal limits   COMPREHENSIVE METABOLIC PANEL - Abnormal; Notable for the following:      BUN, Bld 27 (*)     eGFR if  53 (*)     eGFR if non  46 (*)     All other components within normal limits   TROPONIN I - Abnormal; Notable for the following:     Troponin I 0.033 (*)     All other components within normal limits   B-TYPE NATRIURETIC PEPTIDE - Abnormal; Notable for the following:      (*)     All other components within normal limits   TROPONIN I - Abnormal; Notable for the following:     Troponin I 0.057 (*)     All other components within normal limits        All Lab Results:  Results for orders placed or performed during the hospital encounter of 03/15/18   CBC auto differential   Result Value Ref Range    WBC 6.15 3.90 - 12.70 K/uL    RBC 3.74 (L) 4.00 - 5.40 M/uL    Hemoglobin 11.8 (L) 12.0 - 16.0 g/dL    Hematocrit 35.6 (L) 37.0 - 48.5 %    MCV 95 82 - 98 fL    MCH 31.6 (H) 27.0 - 31.0 pg    MCHC 33.1 32.0 - 36.0 g/dL    RDW 13.9 11.5 - 14.5 %    Platelets 198 150 - 350 K/uL    MPV 9.8 9.2 - 12.9 fL    Gran # (ANC) 3.5 1.8 - 7.7 K/uL    Lymph # 1.7 1.0 - 4.8 K/uL    Mono # 0.8 0.3 - 1.0 K/uL    Eos # 0.2 0.0 - 0.5 K/uL    Baso # 0.02 0.00 - 0.20 K/uL    Gran% 57.1 38.0 - 73.0 %    Lymph% 27.2 18.0 - 48.0 %    Mono% 12.5 4.0 - 15.0 %    Eosinophil% 2.9 0.0 - 8.0 %    Basophil% 0.3 0.0 - 1.9 %    Differential Method Automated    Comprehensive metabolic panel   Result Value Ref Range    Sodium 141 136 - 145 mmol/L    Potassium 4.1 3.5 - 5.1 mmol/L    Chloride 106 95 - 110 mmol/L    CO2 26 23 - 29 mmol/L    Glucose 105 70 - 110 mg/dL    BUN, Bld 27 (H) 8 - 23 mg/dL    Creatinine 1.1 0.5 - 1.4 mg/dL    Calcium 10.0 8.7 - 10.5 mg/dL    Total Protein 6.9 6.0 - 8.4 g/dL    Albumin 3.8 3.5 - 5.2 g/dL    Total Bilirubin 0.7 0.1 - 1.0 mg/dL    Alkaline Phosphatase 62 55 - 135 U/L    AST 18 10 - 40 U/L    ALT 18 10 - 44 U/L    Anion Gap 9 8 - 16 mmol/L    eGFR if African American 53 (A) >60 mL/min/1.73 m^2    eGFR if non African American 46 (A) >60 mL/min/1.73 m^2    Troponin I   Result Value Ref Range    Troponin I 0.033 (H) 0.000 - 0.026 ng/mL   Brain natriuretic peptide   Result Value Ref Range     (H) 0 - 99 pg/mL   Troponin I   Result Value Ref Range    Troponin I 0.057 (H) 0.000 - 0.026 ng/mL   Magnesium   Result Value Ref Range    Magnesium 2.2 1.6 - 2.6 mg/dL   CK-MB   Result Value Ref Range    CPK 49 20 - 180 U/L    CPK MB 1.3 0.1 - 6.5 ng/mL    MB% 2.7 0.0 - 5.0 %   Troponin I   Result Value Ref Range    Troponin I 0.020 0.000 - 0.026 ng/mL   CBC auto differential   Result Value Ref Range    WBC 6.77 3.90 - 12.70 K/uL    RBC 3.59 (L) 4.00 - 5.40 M/uL    Hemoglobin 11.3 (L) 12.0 - 16.0 g/dL    Hematocrit 34.2 (L) 37.0 - 48.5 %    MCV 95 82 - 98 fL    MCH 31.5 (H) 27.0 - 31.0 pg    MCHC 33.0 32.0 - 36.0 g/dL    RDW 13.9 11.5 - 14.5 %    Platelets 189 150 - 350 K/uL    MPV 9.8 9.2 - 12.9 fL    Gran # (ANC) 3.7 1.8 - 7.7 K/uL    Lymph # 2.1 1.0 - 4.8 K/uL    Mono # 0.8 0.3 - 1.0 K/uL    Eos # 0.2 0.0 - 0.5 K/uL    Baso # 0.03 0.00 - 0.20 K/uL    Gran% 54.5 38.0 - 73.0 %    Lymph% 30.9 18.0 - 48.0 %    Mono% 11.8 4.0 - 15.0 %    Eosinophil% 2.4 0.0 - 8.0 %    Basophil% 0.4 0.0 - 1.9 %    Differential Method Automated    Basic metabolic panel   Result Value Ref Range    Sodium 140 136 - 145 mmol/L    Potassium 3.9 3.5 - 5.1 mmol/L    Chloride 107 95 - 110 mmol/L    CO2 26 23 - 29 mmol/L    Glucose 95 70 - 110 mg/dL    BUN, Bld 24 (H) 8 - 23 mg/dL    Creatinine 1.0 0.5 - 1.4 mg/dL    Calcium 9.6 8.7 - 10.5 mg/dL    Anion Gap 7 (L) 8 - 16 mmol/L    eGFR if African American 59 (A) >60 mL/min/1.73 m^2    eGFR if non African American 51 (A) >60 mL/min/1.73 m^2   Magnesium   Result Value Ref Range    Magnesium 1.9 1.6 - 2.6 mg/dL   Troponin I   Result Value Ref Range    Troponin I 0.029 (H) 0.000 - 0.026 ng/mL   2D echo with color flow doppler   Result Value Ref Range    EF 55 55 - 65    Diastolic Dysfunction No     Tricuspid Valve Regurgitation MILD          Imaging  Results:  Imaging Results          X-Ray Chest PA And Lateral (Final result)  Result time 03/15/18 19:37:05    Final result by Amish Lee MD (03/15/18 19:37:05)                 Impression:         No acute findings.    Cardiomegaly.    No significant change since previous exam.      Electronically signed by: AMISH LEE MD  Date:     03/15/18  Time:    19:37              Narrative:    Exam: XR CHEST PA AND LATERAL,    Date:  03/15/18 19:13:50    History: primary hypertension.    Comparison:  01/25/2018        Findings:     Lungs clear.  Cardiac silhouette is enlarged.  No significant bony findings.                                  The EKG was ordered, reviewed, and independently interpreted by the ED provider.  Interpretation time: 18:54  Rate: 57 BPM  Rhythm: sinus bradycardia  Interpretation: no acute ST changes. No STEMI.      The Emergency Provider reviewed the vital signs and test results, which are outlined above.    ED Discussion     9:39 PM: Discussed case with Tracie ([facility]). Tracie agrees with current care and management of pt and accepts admission.   Admitting Service: [facility]   Admitting Physician: Dr. Cohen  Admit to: Tele    9:45 PM: Re-evaluated pt. I have discussed test results, shared treatment plan, and the need for admission with patient and family at bedside. Pt and family express understanding at this time and agree with all information. All questions answered. Pt and family have no further questions or concerns at this time. Pt is ready for admit.      ED Medication(s):  Medications   aspirin tablet 325 mg (325 mg Oral Given 3/15/18 2309)   nitroGLYCERIN 2% TD oint ointment 1 inch (1 inch Topical (Top) Given 3/16/18 0016)   nitroGLYCERIN 2% TD oint ointment 1 inch (1 inch Topical (Top) Given 3/16/18 1150)       Discharge Medication List as of 3/16/2018  1:29 PM      START taking these medications    Details   cloNIDine 0.2 mg/24 hr td ptwk (CATAPRES)  0.2 mg/24 hr Place 1 patch onto the skin every 7 days., Starting Fri 3/16/2018, Until Fri 3/30/2018, Normal             Follow-up Information     Yandy Terrell MD In 3 days.    Specialty:  Family Medicine  Why:  hospital follow-up   Contact information:  [address] [place] [address]  [phone]             Pete Durán MD In 1 week.    Specialties:  Cardiology, INTERVENTIONAL CARDIOLOGY  Why:  hospital follow-up   Contact information:  [address]  [place] [address]  [phone]                     Medical Decision Making    Medical Decision Making:   Clinical Tests:   Lab Tests: Ordered and Reviewed  Radiological Study: Ordered and Reviewed  Medical Tests: Ordered and Reviewed           Scribe Attestation:   Scribe #1: I performed the above scribed service and the documentation accurately describes the services I performed. I attest to the accuracy of the note.    Attending:   Physician Attestation Statement for Scribe #1: I, JERARDO Lott, personally performed the services described in this documentation, as scribed by Jennifer Iraheta, in my presence, and it is both accurate and complete.          Clinical Impression       ICD-10-CM ICD-9-CM   1. Hypertensive urgency I16.0 401.9   2. HTN (hypertension) I10 401.9   3. Elevated troponin R74.8 790.6   4. Accelerated hypertension I10 401.0       Disposition:   Disposition: Admitted  Condition: Fair         Joana Larson PA-C  03/16/18 1751

## 2018-03-15 NOTE — TELEPHONE ENCOUNTER
----- Message from Yvonne Jeffries sent at 3/15/2018  1:53 PM CDT -----  Contact: pt  The pt states she is having a reaction to a new med she was prescribed the pt can be reached at [phone]//[email or web address]

## 2018-03-16 ENCOUNTER — TELEPHONE (OUTPATIENT)
Dept: CARDIOLOGY | Facility: CLINIC | Age: 83
End: 2018-03-16

## 2018-03-16 ENCOUNTER — HOSPITAL ENCOUNTER (EMERGENCY)
Facility: HOSPITAL | Age: 83
Discharge: HOME | End: 2018-03-17
Attending: EMERGENCY MEDICINE
Payer: MEDICARE

## 2018-03-16 VITALS
WEIGHT: 176.38 LBS | BODY MASS INDEX: 30.11 KG/M2 | HEART RATE: 61 BPM | SYSTOLIC BLOOD PRESSURE: 172 MMHG | OXYGEN SATURATION: 96 % | RESPIRATION RATE: 18 BRPM | DIASTOLIC BLOOD PRESSURE: 82 MMHG | TEMPERATURE: 99 F | HEIGHT: 64 IN

## 2018-03-16 DIAGNOSIS — I10 ESSENTIAL HYPERTENSION: Primary | ICD-10-CM

## 2018-03-16 PROBLEM — E88.810 METABOLIC SYNDROME: Chronic | Status: ACTIVE | Noted: 2018-03-16

## 2018-03-16 PROBLEM — K59.09 CONSTIPATION, CHRONIC: Status: RESOLVED | Noted: 2017-05-03 | Resolved: 2018-03-16

## 2018-03-16 PROBLEM — I51.89 DIASTOLIC DYSFUNCTION WITHOUT HEART FAILURE: Chronic | Status: ACTIVE | Noted: 2017-11-28

## 2018-03-16 LAB
ANION GAP SERPL CALC-SCNC: 7 MMOL/L
BASOPHILS # BLD AUTO: 0.03 K/UL
BASOPHILS NFR BLD: 0.4 %
BUN SERPL-MCNC: 24 MG/DL
CALCIUM SERPL-MCNC: 9.6 MG/DL
CHLORIDE SERPL-SCNC: 107 MMOL/L
CHOLEST SERPL-MCNC: 180 MG/DL
CHOLEST/HDLC SERPL: 4.2 {RATIO}
CO2 SERPL-SCNC: 26 MMOL/L
CREAT SERPL-MCNC: 1 MG/DL
DIASTOLIC DYSFUNCTION: NO
DIFFERENTIAL METHOD: ABNORMAL
EOSINOPHIL # BLD AUTO: 0.2 K/UL
EOSINOPHIL NFR BLD: 2.4 %
ERYTHROCYTE [DISTWIDTH] IN BLOOD BY AUTOMATED COUNT: 13.9 %
EST. GFR  (AFRICAN AMERICAN): 59 ML/MIN/1.73 M^2
EST. GFR  (NON AFRICAN AMERICAN): 51 ML/MIN/1.73 M^2
ESTIMATED AVG GLUCOSE: 117 MG/DL
GLUCOSE SERPL-MCNC: 95 MG/DL
HBA1C MFR BLD HPLC: 5.7 %
HCT VFR BLD AUTO: 34.2 %
HDLC SERPL-MCNC: 43 MG/DL
HDLC SERPL: 23.9 %
HGB BLD-MCNC: 11.3 G/DL
LDLC SERPL CALC-MCNC: 117.6 MG/DL
LYMPHOCYTES # BLD AUTO: 2.1 K/UL
LYMPHOCYTES NFR BLD: 30.9 %
MAGNESIUM SERPL-MCNC: 1.9 MG/DL
MCH RBC QN AUTO: 31.5 PG
MCHC RBC AUTO-ENTMCNC: 33 G/DL
MCV RBC AUTO: 95 FL
MONOCYTES # BLD AUTO: 0.8 K/UL
MONOCYTES NFR BLD: 11.8 %
NEUTROPHILS # BLD AUTO: 3.7 K/UL
NEUTROPHILS NFR BLD: 54.5 %
NONHDLC SERPL-MCNC: 137 MG/DL
PLATELET # BLD AUTO: 189 K/UL
PMV BLD AUTO: 9.8 FL
POTASSIUM SERPL-SCNC: 3.9 MMOL/L
RBC # BLD AUTO: 3.59 M/UL
RETIRED EF AND QEF - SEE NOTES: 55 (ref 55–65)
SODIUM SERPL-SCNC: 140 MMOL/L
TRICUSPID VALVE REGURGITATION: NORMAL
TRIGL SERPL-MCNC: 97 MG/DL
TROPONIN I SERPL DL<=0.01 NG/ML-MCNC: 0.02 NG/ML
TROPONIN I SERPL DL<=0.01 NG/ML-MCNC: 0.03 NG/ML
WBC # BLD AUTO: 6.77 K/UL

## 2018-03-16 PROCEDURE — 83036 HEMOGLOBIN GLYCOSYLATED A1C: CPT

## 2018-03-16 PROCEDURE — 83735 ASSAY OF MAGNESIUM: CPT

## 2018-03-16 PROCEDURE — 36415 COLL VENOUS BLD VENIPUNCTURE: CPT

## 2018-03-16 PROCEDURE — 93005 ELECTROCARDIOGRAM TRACING: CPT

## 2018-03-16 PROCEDURE — 99283 EMERGENCY DEPT VISIT LOW MDM: CPT

## 2018-03-16 PROCEDURE — 25000003 PHARM REV CODE 250: Performed by: PHYSICIAN ASSISTANT

## 2018-03-16 PROCEDURE — 93306 TTE W/DOPPLER COMPLETE: CPT | Mod: 26,,, | Performed by: INTERNAL MEDICINE

## 2018-03-16 PROCEDURE — 25000003 PHARM REV CODE 250: Performed by: NURSE PRACTITIONER

## 2018-03-16 PROCEDURE — 84484 ASSAY OF TROPONIN QUANT: CPT

## 2018-03-16 PROCEDURE — 93306 TTE W/DOPPLER COMPLETE: CPT

## 2018-03-16 PROCEDURE — 80061 LIPID PANEL: CPT

## 2018-03-16 PROCEDURE — 25000003 PHARM REV CODE 250: Performed by: INTERNAL MEDICINE

## 2018-03-16 PROCEDURE — 80048 BASIC METABOLIC PNL TOTAL CA: CPT

## 2018-03-16 PROCEDURE — 25000003 PHARM REV CODE 250: Performed by: EMERGENCY MEDICINE

## 2018-03-16 PROCEDURE — 85025 COMPLETE CBC W/AUTO DIFF WBC: CPT

## 2018-03-16 PROCEDURE — G0378 HOSPITAL OBSERVATION PER HR: HCPCS

## 2018-03-16 RX ORDER — SPIRONOLACTONE 25 MG/1
25 TABLET ORAL DAILY
Status: DISCONTINUED | OUTPATIENT
Start: 2018-03-16 | End: 2018-03-16

## 2018-03-16 RX ORDER — CLONIDINE 0.2 MG/24H
1 PATCH, EXTENDED RELEASE TRANSDERMAL
Status: DISCONTINUED | OUTPATIENT
Start: 2018-03-16 | End: 2018-03-16 | Stop reason: HOSPADM

## 2018-03-16 RX ORDER — LOSARTAN POTASSIUM 50 MG/1
50 TABLET ORAL 2 TIMES DAILY
Status: DISCONTINUED | OUTPATIENT
Start: 2018-03-16 | End: 2018-03-16 | Stop reason: HOSPADM

## 2018-03-16 RX ORDER — METOPROLOL SUCCINATE 50 MG/1
50 TABLET, EXTENDED RELEASE ORAL 2 TIMES DAILY
Status: DISCONTINUED | OUTPATIENT
Start: 2018-03-16 | End: 2018-03-16 | Stop reason: HOSPADM

## 2018-03-16 RX ORDER — NIFEDIPINE 30 MG/1
30 TABLET, EXTENDED RELEASE ORAL
Status: COMPLETED | OUTPATIENT
Start: 2018-03-16 | End: 2018-03-16

## 2018-03-16 RX ORDER — TRIAMTERENE AND HYDROCHLOROTHIAZIDE 37.5; 25 MG/1; MG/1
1 CAPSULE ORAL EVERY MORNING
Status: DISCONTINUED | OUTPATIENT
Start: 2018-03-16 | End: 2018-03-16 | Stop reason: HOSPADM

## 2018-03-16 RX ORDER — CLONIDINE 0.2 MG/24H
1 PATCH, EXTENDED RELEASE TRANSDERMAL
Qty: 2 PATCH | Refills: 0 | Status: SHIPPED | OUTPATIENT
Start: 2018-03-16 | End: 2018-04-09 | Stop reason: SDUPTHER

## 2018-03-16 RX ADMIN — LOSARTAN POTASSIUM 50 MG: 50 TABLET, FILM COATED ORAL at 08:03

## 2018-03-16 RX ADMIN — TRIAMTERENE AND HYDROCHLOROTHIAZIDE 1 CAPSULE: 25; 37.5 CAPSULE ORAL at 09:03

## 2018-03-16 RX ADMIN — CLONIDINE 1 PATCH: 0.2 PATCH TRANSDERMAL at 12:03

## 2018-03-16 RX ADMIN — NITROGLYCERIN 1 INCH: 20 OINTMENT TOPICAL at 11:03

## 2018-03-16 RX ADMIN — METOPROLOL SUCCINATE 50 MG: 50 TABLET, EXTENDED RELEASE ORAL at 08:03

## 2018-03-16 RX ADMIN — NIFEDIPINE 30 MG: 30 TABLET, FILM COATED, EXTENDED RELEASE ORAL at 11:03

## 2018-03-16 RX ADMIN — ASPIRIN 81 MG: 81 TABLET, COATED ORAL at 08:03

## 2018-03-16 RX ADMIN — NITROGLYCERIN 1 INCH: 20 OINTMENT TOPICAL at 12:03

## 2018-03-16 NOTE — PLAN OF CARE
03/16/18 1057   NEGRO Message   Medicare Outpatient and Observation Notification regarding financial responsibility Given to patient/caregiver;Explained to patient/caregiver;Signed/date by patient/caregiver   Date NEGRO was signed 03/16/18   Time NEGRO was signed 1030

## 2018-03-16 NOTE — SUBJECTIVE & OBJECTIVE
Past Medical History:   Diagnosis Date    Anxiety 11/28/2017    Arthritis     Back pain     Colon polyps     reported per patient.     Fuchs' corneal dystrophy     Glaucoma     Hyperlipidemia     Hypertension     Macular degeneration dry    Obesity     Trouble in sleeping     Urinary tract infection        Past Surgical History:   Procedure Laterality Date    ADENOIDECTOMY  1938    BREAST CYST EXCISION Left 1997 approx    CARPAL TUNNEL RELEASE Right 2012 approx    CATARACT EXTRACTION Bilateral 1994    CHOLECYSTECTOMY  1975    CORNEAL TRANSPLANT Bilateral 08/2015 8/2015 - left; Right 4/2016    EYE SURGERY      Fuch's Corneal dystrophy - had 2nd operation with Dr. Quintanilla    EYE SURGERY      Cataract wIOL    left thumb Left 2011    removed cuboid for arthritis    SLT- OS (aka TRABECULOPLASTY) Left 05/11/2011    repeat 3/14/12    TONSILLECTOMY  1938       Review of patient's allergies indicates:   Allergen Reactions    Claritin [loratadine] Other (See Comments)     Double vision/spots    Hydrocodone-acetaminophen      wheezing    Naproxen      wheezing    Vibramycin [doxycycline calcium] Other (See Comments)     Weakness nausea   sob    Amlodipine      Myalgias      Coreg [carvedilol] Swelling     lips    Fenofibrate      Causes muscle weakness    Hydralazine analogues      Muscle tremors/aches      Isosorbide     Lasix [furosemide]      myalgias    Moxifloxacin Other (See Comments)     Hives   muscle soreness    Timolol     Allegra [fexofenadine] Other (See Comments)     Double vision/spots     Codeine Diarrhea and Other (See Comments)     Loss of balance     Erythromycin Other (See Comments)     Complete weakness/could not walk    Hydrocortisone (bulk) Other (See Comments)     Only suppository/ caused muscle weakness    Macrolide antibiotics Other (See Comments)     Complete weakness/could not walk    Patanol [olopatadine] Other (See Comments)     Muscle weakness     Prednisone Anxiety    Statins-hmg-coa reductase inhibitors Other (See Comments)     Muscle weakness    Xalatan [latanoprost] Other (See Comments)     Muscle weakness       No current facility-administered medications on file prior to encounter.      Current Outpatient Prescriptions on File Prior to Encounter   Medication Sig    acetaminophen (TYLENOL) 500 MG tablet Take 500 mg by mouth once daily at 6am. Taking 1 to 3    ALPHA LIPOIC ACID ORAL Take 200 mg by mouth.     ALPRAZolam (XANAX) 0.5 MG tablet TAKE 1/2 - 1 TABLET BY MOUTH NIGHTLY FOR SLEEP OR ANXIETY    CALCIUM CITRATE ORAL Take by mouth.    cholecalciferol, vitamin D3, (VITAMIN D3) 2,000 unit Cap Take 1 capsule by mouth once daily.    cloNIDine (CATAPRES) 0.1 MG tablet Take 1 tablet (0.1 mg total) by mouth 2 (two) times daily as needed.    coenzyme Q10 200 mg capsule Take 400 mg by mouth once daily.     fish oil-omega-3 fatty acids 300-1,000 mg capsule Take 2 g by mouth once daily.    losartan (COZAAR) 50 MG tablet Take 1 tablet (50 mg total) by mouth 2 (two) times daily.    loteprednol (LOTEMAX) 0.5 % ophthalmic suspension 1 drop once daily. One drop in the left eye and 1 drops in the right eye    metoprolol succinate (TOPROL-XL) 25 MG 24 hr tablet Take 2 tablets (50 mg total) by mouth 2 (two) times daily.    triamterene-hydrochlorothiazide 37.5-25 mg (DYAZIDE) 37.5-25 mg per capsule Take 1 capsule by mouth every morning.    POLYETHYLENE GLYCOL 3350 (MIRALAX ORAL) Take by mouth as needed.     pyridoxine, vitamin B6, (B-6) 100 MG Tab Take 100 mg by mouth 2 (two) times daily.     travoprost, benzalkonium, (TRAVATAN) 0.004 % ophthalmic solution Place 1 drop into the left eye every evening.    triamcinolone acetonide 0.1% (KENALOG) 0.1 % cream Apply topically 2 (two) times daily as needed. itch    TURMERIC ROOT EXTRACT ORAL Take 665 mg by mouth.      Family History     Problem Relation (Age of Onset)    Cancer Father (88)    Heart disease  Mother    Hypertension Mother        Social History Main Topics    Smoking status: Never Smoker    Smokeless tobacco: Never Used      Comment: history of passive smoking from her ex-    Alcohol use 1.2 oz/week     2 Standard drinks or equivalent per week      Comment: occasional     Drug use: No    Sexual activity: Not Currently     Partners: Male     Birth control/ protection: Post-menopausal      Comment: discussed protection for STD's     Review of Systems   Constitutional: Negative for activity change, chills, diaphoresis, fatigue, fever and unexpected weight change.   HENT: Negative for congestion, sore throat and trouble swallowing.    Eyes: Negative for photophobia and visual disturbance.   Respiratory: Negative for apnea, cough, chest tightness, shortness of breath and wheezing.    Cardiovascular: Negative for chest pain, palpitations and leg swelling.   Gastrointestinal: Negative for abdominal distention, abdominal pain, blood in stool, constipation, diarrhea, nausea and vomiting.   Endocrine: Negative for polydipsia, polyphagia and polyuria.   Genitourinary: Negative for difficulty urinating, dysuria, frequency, hematuria and urgency.   Musculoskeletal: Positive for myalgias. Negative for arthralgias, back pain, gait problem and joint swelling.        Tightness to bilateral upper arms.   Skin: Negative for pallor, rash and wound.   Neurological: Negative for dizziness, seizures, syncope, facial asymmetry, speech difficulty, weakness, light-headedness, numbness and headaches.   Psychiatric/Behavioral: Positive for agitation. Negative for confusion, decreased concentration, hallucinations and sleep disturbance. The patient is not nervous/anxious.    All other systems reviewed and are negative.    Objective:     Vital Signs (Most Recent):  Temp: 98.1 °F (36.7 °C) (03/15/18 1804)  Pulse: 61 (03/15/18 2217)  Resp: 19 (03/15/18 2048)  BP: (!) 186/78 (03/15/18 2217)  SpO2: 96 % (03/15/18 2217) Vital  Signs (24h Range):  Temp:  [98.1 °F (36.7 °C)] 98.1 °F (36.7 °C)  Pulse:  [60-64] 61  Resp:  [18-19] 19  SpO2:  [95 %-98 %] 96 %  BP: (173-188)/(67-86) 186/78     Weight: 80 kg (176 lb 5.9 oz)  Body mass index is 30.27 kg/m².    Physical Exam   Constitutional: She is oriented to person, place, and time. She appears well-developed and well-nourished. No distress.   HENT:   Head: Normocephalic and atraumatic.   Eyes: Conjunctivae are normal.   PERRL; EOM intact.   Neck: Normal range of motion. Neck supple. No JVD present.   Cardiovascular: Normal rate, regular rhythm, S1 normal, S2 normal and intact distal pulses.   No extrasystoles are present. Exam reveals no gallop and no friction rub.    No murmur heard.  Pulses:       Radial pulses are 2+ on the right side, and 2+ on the left side.        Dorsalis pedis pulses are 2+ on the right side, and 2+ on the left side.        Posterior tibial pulses are 2+ on the right side, and 2+ on the left side.   Pulmonary/Chest: Effort normal and breath sounds normal. No accessory muscle usage. No tachypnea. No respiratory distress. She has no wheezes. She has no rhonchi. She has no rales.   Abdominal: Soft. Bowel sounds are normal. She exhibits no distension. There is no tenderness. There is no rebound, no guarding and no CVA tenderness.   Musculoskeletal: Normal range of motion. She exhibits no edema, tenderness or deformity.   Neurological: She is alert and oriented to person, place, and time. No cranial nerve deficit or sensory deficit.   Skin: Skin is warm, dry and intact. Capillary refill takes less than 2 seconds. No rash noted. She is not diaphoretic. No cyanosis or erythema.   Psychiatric: She has a normal mood and affect. Her speech is normal and behavior is normal. Cognition and memory are normal.   Nursing note and vitals reviewed.          Significant Labs:   Results for orders placed or performed during the hospital encounter of 03/15/18   CBC auto differential    Result Value Ref Range    WBC 6.15 3.90 - 12.70 K/uL    RBC 3.74 (L) 4.00 - 5.40 M/uL    Hemoglobin 11.8 (L) 12.0 - 16.0 g/dL    Hematocrit 35.6 (L) 37.0 - 48.5 %    MCV 95 82 - 98 fL    MCH 31.6 (H) 27.0 - 31.0 pg    MCHC 33.1 32.0 - 36.0 g/dL    RDW 13.9 11.5 - 14.5 %    Platelets 198 150 - 350 K/uL    MPV 9.8 9.2 - 12.9 fL    Gran # (ANC) 3.5 1.8 - 7.7 K/uL    Lymph # 1.7 1.0 - 4.8 K/uL    Mono # 0.8 0.3 - 1.0 K/uL    Eos # 0.2 0.0 - 0.5 K/uL    Baso # 0.02 0.00 - 0.20 K/uL    Gran% 57.1 38.0 - 73.0 %    Lymph% 27.2 18.0 - 48.0 %    Mono% 12.5 4.0 - 15.0 %    Eosinophil% 2.9 0.0 - 8.0 %    Basophil% 0.3 0.0 - 1.9 %    Differential Method Automated    Comprehensive metabolic panel   Result Value Ref Range    Sodium 141 136 - 145 mmol/L    Potassium 4.1 3.5 - 5.1 mmol/L    Chloride 106 95 - 110 mmol/L    CO2 26 23 - 29 mmol/L    Glucose 105 70 - 110 mg/dL    BUN, Bld 27 (H) 8 - 23 mg/dL    Creatinine 1.1 0.5 - 1.4 mg/dL    Calcium 10.0 8.7 - 10.5 mg/dL    Total Protein 6.9 6.0 - 8.4 g/dL    Albumin 3.8 3.5 - 5.2 g/dL    Total Bilirubin 0.7 0.1 - 1.0 mg/dL    Alkaline Phosphatase 62 55 - 135 U/L    AST 18 10 - 40 U/L    ALT 18 10 - 44 U/L    Anion Gap 9 8 - 16 mmol/L    eGFR if African American 53 (A) >60 mL/min/1.73 m^2    eGFR if non African American 46 (A) >60 mL/min/1.73 m^2   Troponin I   Result Value Ref Range    Troponin I 0.033 (H) 0.000 - 0.026 ng/mL   Brain natriuretic peptide   Result Value Ref Range     (H) 0 - 99 pg/mL   Troponin I   Result Value Ref Range    Troponin I 0.057 (H) 0.000 - 0.026 ng/mL      All pertinent labs within the past 24 hours have been reviewed.    Significant Imaging:   Imaging Results          X-Ray Chest PA And Lateral (Final result)  Result time 03/15/18 19:37:05    Final result by Amish Montemayor MD (03/15/18 19:37:05)                 Impression:         No acute findings.    Cardiomegaly.    No significant change since previous exam.      Electronically signed  by: JOSE ALFREDO LEE MD  Date:     03/15/18  Time:    19:37              Narrative:    Exam: XR CHEST PA AND LATERAL,    Date:  03/15/18 19:13:50    History: primary hypertension.    Comparison:  01/25/2018        Findings:     Lungs clear.  Cardiac silhouette is enlarged.  No significant bony findings.                             I have reviewed all pertinent imaging results/findings within the past 24 hours.     EKG: (personally reviewed)  Normal sinus rhythm, no acute changes from previous tracings.

## 2018-03-16 NOTE — HOSPITAL COURSE
85 year old female admitted for accelerated HTN. Troponin 0.033, , EKG unrevealing, CXR unremarkable. Likely due to medication noncompliance. Resumed BB, diuretic, and ARB. Troponin chronically elevated. Patient needing better blood pressure control, clonidine patch started. Symptoms improved. Patient instructed on the importance of medication complaince, pt. Verbalized understanding. Case discussed with Dr. Benton. Home meds reconciled. New prescription given (clonidine patch). Patient to follow-up with PCP in 3-5 days for hospital follow-up. Patient to also follow-up with Dr. Durán (Cardiology) in 1 week. Patient seen and examined on the date of discharge and found suitable for discharge.

## 2018-03-16 NOTE — NURSING
Pt arrive to unit via stretcher, pt ambulated from stretcher to bed without assistance. Made comfortable in bed and placed on heart monitor. Vitals and assessment completed. Educated on fall prevention and hourly rounding. No complaints at this time. Call bell and personal belongings within reach. Reminded to call for assistance. Will continue to monitor.

## 2018-03-16 NOTE — TELEPHONE ENCOUNTER
I spoke with patient, she actually received dyazide in hospital and tolerated it well. Ok to continue it for now.

## 2018-03-16 NOTE — DISCHARGE SUMMARY
[facility] Hospital Medicine  Discharge Summary      Patient Name: Shirley Metz  MRN: [id number]  Admission Date: 3/15/2018  Hospital Length of Stay: 0 days  Discharge Date and Time:  03/16/2018 12:54 PM  Attending Physician: Faith Benton MD   Discharging Provider: Mirna Gomez NP  Primary Care Provider: Yandy Terrell MD      HPI:   Ms. Bettencourt is an 86yo female with a PMHx of resistant HTN, HLD, metabolic syndrome, and CKD, who presented to the ED with c/o bilateral upper arm tightness secondary to persistently elevated blood pressure.  No aggravating or alleviating factors.  Denies any CP, palpitations, SOB/RUIZ, orthopnea, PND, edema, cough, ABD pain, N/V, diaphoresis, lightheadedness/dizziness, syncope, visual disturbances, HA, AMS, or fever.  She states being compliant with all BP medications.  She recently stopped her verapamil due to dry eyes and mouth, and constipation.  She has h/o intolerance to multiple medications due to side effects.  Upon arrival to ED, /67.  Troponin 0.033, , EKG unrevealing, CXR unremarkable.  Subsequently, patient was admitted to r/o AMI and BP monitoring under Hospital Medicine services.  Currently, patient appears comfortable in NAD.  Denies any CP, upper extremity pain/tightness, or SOB.    * No surgery found *      Hospital Course:   85 year old female admitted for accelerated HTN. Troponin 0.033, , EKG unrevealing, CXR unremarkable. Likely due to medication noncompliance. Resumed BB, diuretic, and ARB. Troponin chronically elevated. Patient needing better blood pressure control, clonidine patch started. Symptoms improved. Patient instructed on the importance of medication complaince, pt. Verbalized understanding. Case discussed with Dr. Benton. Home meds reconciled. New prescription given (clonidine patch). Patient to follow-up with PCP in 3-5 days for hospital follow-up. Patient to also follow-up with Dr. Durán  (Cardiology) in 1 week. Patient seen and examined on the date of discharge and found suitable for discharge.          Consults:     No new Assessment & Plan notes have been filed under this hospital service since the last note was generated.  Service: Hospital Medicine    Final Active Diagnoses:    Diagnosis Date Noted POA    PRINCIPAL PROBLEM:  Accelerated hypertension [I10] 03/16/2018 Yes    Metabolic syndrome [E88.81] 03/16/2018 Yes     Chronic    Elevated troponin [R74.8] 03/15/2018 Yes    Chronic kidney disease, stage 3 [N18.3] 08/08/2016 Yes     Chronic      Problems Resolved During this Admission:    Diagnosis Date Noted Date Resolved POA       Discharged Condition: stable    Disposition: Home or Self Care    Follow Up:  Follow-up Information     Yandy Terrell MD In 3 days.    Specialty:  Family Medicine  Why:  hospital follow-up   Contact information:  [address] [place] [address]  [phone]             Pete Durán MD In 1 week.    Specialties:  Cardiology, INTERVENTIONAL CARDIOLOGY  Why:  hospital follow-up   Contact information:  [address]  [place] [id number] [phone]                 Patient Instructions:     Diet Cardiac     Activity as tolerated     Notify your health care provider if you experience any of the following:  difficulty breathing or increased cough     Notify your health care provider if you experience any of the following:  severe persistent headache     Notify your health care provider if you experience any of the following:  increased confusion or weakness     Notify your health care provider if you experience any of the following:  persistent dizziness, light-headedness, or visual disturbances         Significant Diagnostic Studies: Labs:   BMP:   Recent Labs  Lab 03/15/18  1910 03/15/18  2045 03/16/18  0429     --  95     --  140   K 4.1  --  3.9     --  107   CO2 26  --  26   BUN 27*  --  24*   CREATININE 1.1  --  1.0   CALCIUM 10.0   --  9.6   MG  --  2.2 1.9   , CMP   Recent Labs  Lab 03/15/18  1910 03/16/18  0429    140   K 4.1 3.9    107   CO2 26 26    95   BUN 27* 24*   CREATININE 1.1 1.0   CALCIUM 10.0 9.6   PROT 6.9  --    ALBUMIN 3.8  --    BILITOT 0.7  --    ALKPHOS 62  --    AST 18  --    ALT 18  --    ANIONGAP 9 7*   ESTGFRAFRICA 53* 59*   EGFRNONAA 46* 51*   , INR   Lab Results   Component Value Date    INR 1.0 01/25/2018    INR 1.0 01/01/2017    INR 1.0 02/09/2016   , Troponin   Recent Labs  Lab 03/16/18 0825   TROPONINI 0.029*    and All labs within the past 24 hours have been reviewed    Pending Diagnostic Studies:     Procedure Component Value Units Date/Time    Hemoglobin A1c [[id number]] Collected:  03/16/18 0825    Order Status:  Sent Lab Status:  In process Updated:  03/16/18 0825    Specimen:  Blood from Blood     Lipid panel [[id number]] Collected:  03/16/18 0250    Order Status:  Sent Lab Status:  In process Updated:  03/16/18 0250    Specimen:  Blood from Blood          Medications:  Reconciled Home Medications:   Current Discharge Medication List      START taking these medications    Details   cloNIDine 0.2 mg/24 hr td ptwk (CATAPRES) 0.2 mg/24 hr Place 1 patch onto the skin every 7 days.  Qty: 2 patch, Refills: 0         CONTINUE these medications which have NOT CHANGED    Details   acetaminophen (TYLENOL) 500 MG tablet Take 500 mg by mouth once daily at 6am. Taking 1 to 3      ALPHA LIPOIC ACID ORAL Take 200 mg by mouth.       ALPRAZolam (XANAX) 0.5 MG tablet TAKE 1/2 - 1 TABLET BY MOUTH NIGHTLY FOR SLEEP OR ANXIETY  Qty: 30 tablet, Refills: 2    Associated Diagnoses: Insomnia, unspecified type; Anxiety      CALCIUM CITRATE ORAL Take by mouth.      cholecalciferol, vitamin D3, (VITAMIN D3) 2,000 unit Cap Take 1 capsule by mouth once daily.      coenzyme Q10 200 mg capsule Take 400 mg by mouth once daily.       fish oil-omega-3 fatty acids 300-1,000 mg capsule Take 2 g by mouth once daily.       losartan (COZAAR) 50 MG tablet Take 1 tablet (50 mg total) by mouth 2 (two) times daily.  Qty: 180 tablet, Refills: 3      loteprednol (LOTEMAX) 0.5 % ophthalmic suspension 1 drop once daily. One drop in the left eye and 1 drops in the right eye      metoprolol succinate (TOPROL-XL) 25 MG 24 hr tablet Take 2 tablets (50 mg total) by mouth 2 (two) times daily.  Qty: 180 tablet, Refills: 3    Comments: Dose adjustment, please d/c previous orders for metoprolol  Associated Diagnoses: Palpitations      triamterene-hydrochlorothiazide 37.5-25 mg (DYAZIDE) 37.5-25 mg per capsule Take 1 capsule by mouth every morning.  Qty: 30 capsule, Refills: 11    Associated Diagnoses: Essential hypertension      POLYETHYLENE GLYCOL 3350 (MIRALAX ORAL) Take by mouth as needed.       pyridoxine, vitamin B6, (B-6) 100 MG Tab Take 100 mg by mouth 2 (two) times daily.       travoprost, benzalkonium, (TRAVATAN) 0.004 % ophthalmic solution Place 1 drop into the left eye every evening.      TURMERIC ROOT EXTRACT ORAL Take 665 mg by mouth.          STOP taking these medications       cloNIDine (CATAPRES) 0.1 MG tablet Comments:   Reason for Stopping:         triamcinolone acetonide 0.1% (KENALOG) 0.1 % cream Comments:   Reason for Stopping:               Indwelling Lines/Drains at time of discharge:   Lines/Drains/Airways          No matching active lines, drains, or airways          Time spent on the discharge of patient: >45 minutes  Patient was seen and examined on the date of discharge and determined to be suitable for discharge.         Mirna Gomez NP  Department of Hospital Medicine  [facility]

## 2018-03-16 NOTE — PLAN OF CARE
CM met with patient Discharge Assessment completed. Patient states she lives in [facility]. Patient verbalizes that she has good family support with her son Arie and daughter in law Yandy. No discharge needs identified by patient or CM at this time.              03/16/18 1032   Discharge Assessment   Assessment Type Discharge Planning Assessment   Confirmed/corrected address and phone number on facesheet? Yes   Assessment information obtained from? Patient   Communicated expected length of stay with patient/caregiver no   Prior to hospitilization cognitive status: Alert/Oriented   Prior to hospitalization functional status: Independent;Assistive Equipment   Current cognitive status: Alert/Oriented   Current Functional Status: Independent;Assistive Equipment   Facility Arrived From: [facility]    Lives With alone  (Lives in [facility]. )   Able to Return to Prior Arrangements yes   Is patient able to care for self after discharge? Yes   Who are your caregiver(s) and their phone number(s)? Arie Bettencourt (son) [phone] & Yandy Bettencourt (daughter in law) [phone]   Patient's perception of discharge disposition independent living facility;home or selfcare   Patient currently being followed by outpatient case management? No   Patient currently receives any other outside agency services? No   Equipment Currently Used at Home cane, straight;rollator   Do you have any problems affording any of your prescribed medications? No   Is the patient taking medications as prescribed? yes   Does the patient have transportation home? Yes   Transportation Available family or friend will provide;other (see comments)  (And pateint states she still drives herself as well. )   Dialysis Name and Scheduled days N/A   Does the patient receive services at [facility]? No   Discharge Plan A Home;Independent living facility   Discharge Plan B  Home;Independent living facility   Patient/Family In Agreement With Plan yes

## 2018-03-16 NOTE — PROGRESS NOTES
Pt had 12 beat run of vtach. VSS. Pt currently sleeping. Denies s/s. Tracie, NP notified. No new orders. Will continue to monitor.

## 2018-03-16 NOTE — PROGRESS NOTES
Went over discharge instructions with patient.   Stressed importance of making and keeping all follow ups.   Patient verbalized understanding and has no questions in regards to discharge.  IV removed, catheter intact.  Telemetry box removed.  Patient walked down to waiting car with staff, no distress noted.

## 2018-03-16 NOTE — ASSESSMENT & PLAN NOTE
- Likely due to medication noncompliance.  - Will resume home beta blocker, diuretic, and ARB.  - Antihypertensive options are limited given intolerance to multiple medications.  - Consider adding shilpi if needed.  - Patient would benefit from sleep study outpatient.

## 2018-03-16 NOTE — PLAN OF CARE
Problem: Patient Care Overview  Goal: Plan of Care Review  Outcome: Ongoing (interventions implemented as appropriate)  POC reviewed, verbalized understanding. Pt remained free from falls, fall precautions in place. Pt is NSR/SB on monitor, 56-70s. BP monitored, see previous notes. PRN medication given for anxiety. No other c/o at this time. PIV intact. Turns/ambulates independently. Call bell and personal belongings within reach. Hourly rounding complete. Reminded to call for assistance. Will continue to monitor.

## 2018-03-16 NOTE — TELEPHONE ENCOUNTER
Spoke with aleksandar with [facility] pharmacy to d/c medication dyazide Aleksandar states medication isn't on file but will d/c if medication comes to pharmacy.

## 2018-03-16 NOTE — H&P
"[facility] Hospital Medicine  History & Physical    Patient Name: Shirley Metz  MRN: [id number]  Admission Date: 3/15/2018  Attending Physician: Adonis Cohen MD   Primary Care Provider: Yandy Terrell MD         Patient information was obtained from patient, past medical records and ER records.     Subjective:     Principal Problem:Accelerated hypertension    Chief Complaint:   Chief Complaint   Patient presents with    Hypertension     " My B/P has been really bad yesterday and today"        HPI: Ms. Bettencourt is an 86yo female with a PMHx of resistant HTN, HLD, metabolic syndrome, and CKD, who presented to the ED with c/o bilateral upper arm tightness secondary to persistently elevated blood pressure.  No aggravating or alleviating factors.  Denies any CP, palpitations, SOB/RUIZ, orthopnea, PND, edema, cough, ABD pain, N/V, diaphoresis, lightheadedness/dizziness, syncope, visual disturbances, HA, AMS, or fever.  She states being compliant with all BP medications.  She recently stopped her verapamil due to dry eyes and mouth, and constipation.  She has h/o intolerance to multiple medications due to side effects.  Upon arrival to ED, /67.  Troponin 0.033, , EKG unrevealing, CXR unremarkable.  Subsequently, patient was admitted to r/o AMI and BP monitoring under Hospital Medicine services.  Currently, patient appears comfortable in NAD.  Denies any CP, upper extremity pain/tightness, or SOB.    Past Medical History:   Diagnosis Date    Anxiety 11/28/2017    Arthritis     Back pain     Colon polyps     reported per patient.     Fuchs' corneal dystrophy     Glaucoma     Hyperlipidemia     Hypertension     Macular degeneration dry    Obesity     Trouble in sleeping     Urinary tract infection        Past Surgical History:   Procedure Laterality Date    ADENOIDECTOMY  1938    BREAST CYST EXCISION Left 1997 approx    CARPAL TUNNEL RELEASE Right 2012 approx    " CATARACT EXTRACTION Bilateral 1994    CHOLECYSTECTOMY  1975    CORNEAL TRANSPLANT Bilateral 08/2015 8/2015 - left; Right 4/2016    EYE SURGERY      Fuch's Corneal dystrophy - had 2nd operation with Dr. Quintanilla    EYE SURGERY      Cataract wIOL    left thumb Left 2011    removed cuboid for arthritis    SLT- OS (aka TRABECULOPLASTY) Left 05/11/2011    repeat 3/14/12    TONSILLECTOMY  1938       Review of patient's allergies indicates:   Allergen Reactions    Claritin [loratadine] Other (See Comments)     Double vision/spots    Hydrocodone-acetaminophen      wheezing    Naproxen      wheezing    Vibramycin [doxycycline calcium] Other (See Comments)     Weakness nausea   sob    Amlodipine      Myalgias      Coreg [carvedilol] Swelling     lips    Fenofibrate      Causes muscle weakness    Hydralazine analogues      Muscle tremors/aches      Isosorbide     Lasix [furosemide]      myalgias    Moxifloxacin Other (See Comments)     Hives   muscle soreness    Timolol     Allegra [fexofenadine] Other (See Comments)     Double vision/spots     Codeine Diarrhea and Other (See Comments)     Loss of balance     Erythromycin Other (See Comments)     Complete weakness/could not walk    Hydrocortisone (bulk) Other (See Comments)     Only suppository/ caused muscle weakness    Macrolide antibiotics Other (See Comments)     Complete weakness/could not walk    Patanol [olopatadine] Other (See Comments)     Muscle weakness    Prednisone Anxiety    Statins-hmg-coa reductase inhibitors Other (See Comments)     Muscle weakness    Xalatan [latanoprost] Other (See Comments)     Muscle weakness       No current facility-administered medications on file prior to encounter.      Current Outpatient Prescriptions on File Prior to Encounter   Medication Sig    acetaminophen (TYLENOL) 500 MG tablet Take 500 mg by mouth once daily at 6am. Taking 1 to 3    ALPHA LIPOIC ACID ORAL Take 200 mg by mouth.     ALPRAZolam  (XANAX) 0.5 MG tablet TAKE 1/2 - 1 TABLET BY MOUTH NIGHTLY FOR SLEEP OR ANXIETY    CALCIUM CITRATE ORAL Take by mouth.    cholecalciferol, vitamin D3, (VITAMIN D3) 2,000 unit Cap Take 1 capsule by mouth once daily.    cloNIDine (CATAPRES) 0.1 MG tablet Take 1 tablet (0.1 mg total) by mouth 2 (two) times daily as needed.    coenzyme Q10 200 mg capsule Take 400 mg by mouth once daily.     fish oil-omega-3 fatty acids 300-1,000 mg capsule Take 2 g by mouth once daily.    losartan (COZAAR) 50 MG tablet Take 1 tablet (50 mg total) by mouth 2 (two) times daily.    loteprednol (LOTEMAX) 0.5 % ophthalmic suspension 1 drop once daily. One drop in the left eye and 1 drops in the right eye    metoprolol succinate (TOPROL-XL) 25 MG 24 hr tablet Take 2 tablets (50 mg total) by mouth 2 (two) times daily.    triamterene-hydrochlorothiazide 37.5-25 mg (DYAZIDE) 37.5-25 mg per capsule Take 1 capsule by mouth every morning.    POLYETHYLENE GLYCOL 3350 (MIRALAX ORAL) Take by mouth as needed.     pyridoxine, vitamin B6, (B-6) 100 MG Tab Take 100 mg by mouth 2 (two) times daily.     travoprost, benzalkonium, (TRAVATAN) 0.004 % ophthalmic solution Place 1 drop into the left eye every evening.    triamcinolone acetonide 0.1% (KENALOG) 0.1 % cream Apply topically 2 (two) times daily as needed. itch    TURMERIC ROOT EXTRACT ORAL Take 665 mg by mouth.      Family History     Problem Relation (Age of Onset)    Cancer Father (88)    Heart disease Mother    Hypertension Mother        Social History Main Topics    Smoking status: Never Smoker    Smokeless tobacco: Never Used      Comment: history of passive smoking from her ex-    Alcohol use 1.2 oz/week     2 Standard drinks or equivalent per week      Comment: occasional     Drug use: No    Sexual activity: Not Currently     Partners: Male     Birth control/ protection: Post-menopausal      Comment: discussed protection for STD's     Review of Systems    Constitutional: Negative for activity change, chills, diaphoresis, fatigue, fever and unexpected weight change.   HENT: Negative for congestion, sore throat and trouble swallowing.    Eyes: Negative for photophobia and visual disturbance.   Respiratory: Negative for apnea, cough, chest tightness, shortness of breath and wheezing.    Cardiovascular: Negative for chest pain, palpitations and leg swelling.   Gastrointestinal: Negative for abdominal distention, abdominal pain, blood in stool, constipation, diarrhea, nausea and vomiting.   Endocrine: Negative for polydipsia, polyphagia and polyuria.   Genitourinary: Negative for difficulty urinating, dysuria, frequency, hematuria and urgency.   Musculoskeletal: Positive for myalgias. Negative for arthralgias, back pain, gait problem and joint swelling.        Tightness to bilateral upper arms.   Skin: Negative for pallor, rash and wound.   Neurological: Negative for dizziness, seizures, syncope, facial asymmetry, speech difficulty, weakness, light-headedness, numbness and headaches.   Psychiatric/Behavioral: Positive for agitation. Negative for confusion, decreased concentration, hallucinations and sleep disturbance. The patient is not nervous/anxious.    All other systems reviewed and are negative.    Objective:     Vital Signs (Most Recent):  Temp: 98.1 °F (36.7 °C) (03/15/18 1804)  Pulse: 61 (03/15/18 2217)  Resp: 19 (03/15/18 2048)  BP: (!) 186/78 (03/15/18 2217)  SpO2: 96 % (03/15/18 2217) Vital Signs (24h Range):  Temp:  [98.1 °F (36.7 °C)] 98.1 °F (36.7 °C)  Pulse:  [60-64] 61  Resp:  [18-19] 19  SpO2:  [95 %-98 %] 96 %  BP: (173-188)/(67-86) 186/78     Weight: 80 kg (176 lb 5.9 oz)  Body mass index is 30.27 kg/m².    Physical Exam   Constitutional: She is oriented to person, place, and time. She appears well-developed and well-nourished. No distress.   HENT:   Head: Normocephalic and atraumatic.   Eyes: Conjunctivae are normal.   PERRL; EOM intact.   Neck:  Normal range of motion. Neck supple. No JVD present.   Cardiovascular: Normal rate, regular rhythm, S1 normal, S2 normal and intact distal pulses.   No extrasystoles are present. Exam reveals no gallop and no friction rub.    No murmur heard.  Pulses:       Radial pulses are 2+ on the right side, and 2+ on the left side.        Dorsalis pedis pulses are 2+ on the right side, and 2+ on the left side.        Posterior tibial pulses are 2+ on the right side, and 2+ on the left side.   Pulmonary/Chest: Effort normal and breath sounds normal. No accessory muscle usage. No tachypnea. No respiratory distress. She has no wheezes. She has no rhonchi. She has no rales.   Abdominal: Soft. Bowel sounds are normal. She exhibits no distension. There is no tenderness. There is no rebound, no guarding and no CVA tenderness.   Musculoskeletal: Normal range of motion. She exhibits no edema, tenderness or deformity.   Neurological: She is alert and oriented to person, place, and time. No cranial nerve deficit or sensory deficit.   Skin: Skin is warm, dry and intact. Capillary refill takes less than 2 seconds. No rash noted. She is not diaphoretic. No cyanosis or erythema.   Psychiatric: She has a normal mood and affect. Her speech is normal and behavior is normal. Cognition and memory are normal.   Nursing note and vitals reviewed.          Significant Labs:   Results for orders placed or performed during the hospital encounter of 03/15/18   CBC auto differential   Result Value Ref Range    WBC 6.15 3.90 - 12.70 K/uL    RBC 3.74 (L) 4.00 - 5.40 M/uL    Hemoglobin 11.8 (L) 12.0 - 16.0 g/dL    Hematocrit 35.6 (L) 37.0 - 48.5 %    MCV 95 82 - 98 fL    MCH 31.6 (H) 27.0 - 31.0 pg    MCHC 33.1 32.0 - 36.0 g/dL    RDW 13.9 11.5 - 14.5 %    Platelets 198 150 - 350 K/uL    MPV 9.8 9.2 - 12.9 fL    Gran # (ANC) 3.5 1.8 - 7.7 K/uL    Lymph # 1.7 1.0 - 4.8 K/uL    Mono # 0.8 0.3 - 1.0 K/uL    Eos # 0.2 0.0 - 0.5 K/uL    Baso # 0.02 0.00 - 0.20  K/uL    Gran% 57.1 38.0 - 73.0 %    Lymph% 27.2 18.0 - 48.0 %    Mono% 12.5 4.0 - 15.0 %    Eosinophil% 2.9 0.0 - 8.0 %    Basophil% 0.3 0.0 - 1.9 %    Differential Method Automated    Comprehensive metabolic panel   Result Value Ref Range    Sodium 141 136 - 145 mmol/L    Potassium 4.1 3.5 - 5.1 mmol/L    Chloride 106 95 - 110 mmol/L    CO2 26 23 - 29 mmol/L    Glucose 105 70 - 110 mg/dL    BUN, Bld 27 (H) 8 - 23 mg/dL    Creatinine 1.1 0.5 - 1.4 mg/dL    Calcium 10.0 8.7 - 10.5 mg/dL    Total Protein 6.9 6.0 - 8.4 g/dL    Albumin 3.8 3.5 - 5.2 g/dL    Total Bilirubin 0.7 0.1 - 1.0 mg/dL    Alkaline Phosphatase 62 55 - 135 U/L    AST 18 10 - 40 U/L    ALT 18 10 - 44 U/L    Anion Gap 9 8 - 16 mmol/L    eGFR if African American 53 (A) >60 mL/min/1.73 m^2    eGFR if non African American 46 (A) >60 mL/min/1.73 m^2   Troponin I   Result Value Ref Range    Troponin I 0.033 (H) 0.000 - 0.026 ng/mL   Brain natriuretic peptide   Result Value Ref Range     (H) 0 - 99 pg/mL   Troponin I   Result Value Ref Range    Troponin I 0.057 (H) 0.000 - 0.026 ng/mL      All pertinent labs within the past 24 hours have been reviewed.    Significant Imaging:   Imaging Results          X-Ray Chest PA And Lateral (Final result)  Result time 03/15/18 19:37:05    Final result by Amish Lee MD (03/15/18 19:37:05)                 Impression:         No acute findings.    Cardiomegaly.    No significant change since previous exam.      Electronically signed by: AMISH LEE MD  Date:     03/15/18  Time:    19:37              Narrative:    Exam: XR CHEST PA AND LATERAL,    Date:  03/15/18 19:13:50    History: primary hypertension.    Comparison:  01/25/2018        Findings:     Lungs clear.  Cardiac silhouette is enlarged.  No significant bony findings.                             I have reviewed all pertinent imaging results/findings within the past 24 hours.     EKG: (personally reviewed)  Normal sinus rhythm, no acute  changes from previous tracings.         Assessment/Plan:     * Accelerated hypertension    - Likely due to medication noncompliance.  - Will resume home beta blocker, diuretic, and ARB.  - Antihypertensive options are limited given intolerance to multiple medications.  - Consider adding shilpi if needed.  - Patient would benefit from sleep study outpatient.         Elevated troponin    - Likely due to #1.  - No angina, initial troponin minimally elevated at 0.033, EKG unrevealing.  - Trend serial CE's, EKG.  - ASA and beta blocker.  - Will consult Cardiology as needed.        Metabolic syndrome    - Antihypertensives as above.  - No statin due to h/o intolerance.  - Check HbA1c.  - Counseled on lifestyle modifications.         Chronic kidney disease, stage 3    - Cr. stable, monitor.  - Avoid nephrotoxic agents.          VTE Risk Mitigation         Ordered     enoxaparin injection 40 mg  Daily     Route:  Subcutaneous        03/15/18 2259     Place sequential compression device  Until discontinued      03/15/18 2259     IP VTE LOW RISK PATIENT  Once      03/15/18 2227             FABIO Tanner  Department of Hospital Medicine   [facility]

## 2018-03-16 NOTE — PROGRESS NOTES
Pt /87 1 hr after medication administration, see TYSON Hodges NP notified. Orders for 1 inch nitro paste.  Double checked allergies with pt, pt states she used to take nitro spray and is not allergic to nitro.

## 2018-03-16 NOTE — ASSESSMENT & PLAN NOTE
- Antihypertensives as above.  - No statin due to h/o intolerance.  - Check HbA1c.  - Counseled on lifestyle modifications.

## 2018-03-16 NOTE — ASSESSMENT & PLAN NOTE
- Likely due to #1.  - No angina, initial troponin minimally elevated at 0.033, EKG unrevealing.  - Trend serial CE's, EKG.  - ASA and beta blocker.  - Will consult Cardiology as needed.

## 2018-03-17 VITALS
RESPIRATION RATE: 20 BRPM | HEIGHT: 64 IN | TEMPERATURE: 99 F | WEIGHT: 171.5 LBS | DIASTOLIC BLOOD PRESSURE: 90 MMHG | SYSTOLIC BLOOD PRESSURE: 146 MMHG | BODY MASS INDEX: 29.28 KG/M2 | OXYGEN SATURATION: 95 % | HEART RATE: 63 BPM

## 2018-03-17 PROCEDURE — 25000003 PHARM REV CODE 250: Performed by: EMERGENCY MEDICINE

## 2018-03-17 RX ORDER — LOSARTAN POTASSIUM 50 MG/1
100 TABLET ORAL 2 TIMES DAILY
Qty: 30 TABLET | Refills: 0 | Status: SHIPPED | OUTPATIENT
Start: 2018-03-17 | End: 2018-06-11 | Stop reason: SDUPTHER

## 2018-03-17 RX ORDER — LORAZEPAM 1 MG/1
1 TABLET ORAL
Status: COMPLETED | OUTPATIENT
Start: 2018-03-17 | End: 2018-03-17

## 2018-03-17 RX ADMIN — LORAZEPAM 1 MG: 1 TABLET ORAL at 01:03

## 2018-03-17 NOTE — ED NOTES
Patient left ed ambulatory with steady gait accompanied by family. Pt speaking full clear sentences without difficulty. Pt verbalized understanding and importance of follow up. Pt states she has 2 dr appointments coming up.

## 2018-03-17 NOTE — ED PROVIDER NOTES
SCRIBE #1 NOTE: I, Jennifer Iraheta, am scribing for, and in the presence of, Gretel Chavez MD. I have scribed the entire note.      History      Chief Complaint   Patient presents with    Hypertension     Pt reports being seen here yesterday for HTN. Pt reports wearing a clonidine pt w/o relief. CP denied       Review of patient's allergies indicates:   Allergen Reactions    Claritin [loratadine] Other (See Comments)     Double vision/spots    Hydrocodone-acetaminophen      wheezing    Naproxen      wheezing    Vibramycin [doxycycline calcium] Other (See Comments)     Weakness nausea   sob    Amlodipine      Myalgias      Coreg [carvedilol] Swelling     lips    Fenofibrate      Causes muscle weakness    Hydralazine analogues      Muscle tremors/aches      Isosorbide     Lasix [furosemide]      myalgias    Moxifloxacin Other (See Comments)     Hives   muscle soreness    Timolol     Allegra [fexofenadine] Other (See Comments)     Double vision/spots     Codeine Diarrhea and Other (See Comments)     Loss of balance     Erythromycin Other (See Comments)     Complete weakness/could not walk    Hydrocortisone (bulk) Other (See Comments)     Only suppository/ caused muscle weakness    Macrolide antibiotics Other (See Comments)     Complete weakness/could not walk    Patanol [olopatadine] Other (See Comments)     Muscle weakness    Prednisone Anxiety    Statins-hmg-coa reductase inhibitors Other (See Comments)     Muscle weakness    Xalatan [latanoprost] Other (See Comments)     Muscle weakness        HPI   HPI    3/16/2018, 11:48 PM   History obtained from the patient      History of Present Illness: Shirley Metz is a 85 y.o. female patient who presents to the Emergency Department for evaluation of HTN which onset gradually tonight. Symptoms are constant and moderate in severity. Pt reports she was admitted to the hospital yesterday for HTN and was discharged this morning. Pt reports BP  increased again tonight which prompted her to RTED. No mitigating or exacerbating factors reported. Associated sxs include pain in arms and weakness. Patient denies any fever, leg swelling, chills, diaphoresis, dizziness, blurry vision, HA , SOB, CP , and all other sxs at this time. Prior Tx includes cloNIDine patch with no relief. Pt is compliant with daily medications of metoprolol and losartan. No further complaints or concerns at this time.         Arrival mode: Personal vehicle     PCP: Yandy Terrell MD       Past Medical History:  Past Medical History:   Diagnosis Date    Anxiety 11/28/2017    Arthritis     Back pain     Colon polyps     reported per patient.     Fuchs' corneal dystrophy     Glaucoma     Hyperlipidemia     Hypertension     Macular degeneration dry    Obesity     Trouble in sleeping     Urinary tract infection        Past Surgical History:  Past Surgical History:   Procedure Laterality Date    ADENOIDECTOMY  1938    BREAST CYST EXCISION Left 1997 approx    CARPAL TUNNEL RELEASE Right 2012 approx    CATARACT EXTRACTION Bilateral 1994    CHOLECYSTECTOMY  1975    CORNEAL TRANSPLANT Bilateral 08/2015 8/2015 - left; Right 4/2016    EYE SURGERY      Fuch's Corneal dystrophy - had 2nd operation with Dr. Quintanilla    EYE SURGERY      Cataract wIOL    left thumb Left 2011    removed cuboid for arthritis    SLT- OS (aka TRABECULOPLASTY) Left 05/11/2011    repeat 3/14/12    TONSILLECTOMY  1938         Family History:  Family History   Problem Relation Age of Onset    Heart disease Mother     Hypertension Mother     Cancer Father 88     sarcoma( ?K[place]osi's ) on leg    Deep vein thrombosis Neg Hx     Ovarian cancer Neg Hx     Breast cancer Neg Hx     Kidney disease Neg Hx     Strabismus Neg Hx     Retinal detachment Neg Hx     Macular degeneration Neg Hx     Glaucoma Neg Hx     Blindness Neg Hx     Amblyopia Neg Hx     Eczema Neg Hx     Lupus Neg Hx     Melanoma Neg  Hx     Psoriasis Neg Hx     Diabetes Neg Hx     Stroke Neg Hx     Mental retardation Neg Hx     Mental illness Neg Hx     Hyperlipidemia Neg Hx     COPD Neg Hx     Asthma Neg Hx     Depression Neg Hx     Alcohol abuse Neg Hx     Drug abuse Neg Hx        Social History:  Social History     Social History Main Topics    Smoking status: Never Smoker    Smokeless tobacco: Never Used      Comment: history of passive smoking from her ex-    Alcohol use 1.2 oz/week     2 Standard drinks or equivalent per week      Comment: occasional     Drug use: No    Sexual activity: Not Currently     Partners: Male     Birth control/ protection: Post-menopausal      Comment: discussed protection for STD's       ROS   Review of Systems   Constitutional: Negative for chills, diaphoresis and fever.   HENT: Negative for congestion, rhinorrhea and sore throat.    Respiratory: Negative for cough and shortness of breath.    Cardiovascular: Negative for chest pain, palpitations and leg swelling.   Gastrointestinal: Negative for abdominal pain, nausea and vomiting.   Genitourinary: Negative for dysuria and hematuria.   Musculoskeletal: Positive for myalgias (bilateral arms). Negative for neck pain and neck stiffness.   Neurological: Positive for weakness. Negative for dizziness, light-headedness and headaches.       Physical Exam      Initial Vitals [03/16/18 2226]   BP Pulse Resp Temp SpO2   (!) 197/81 64 20 98.6 °F (37 °C) (!) 94 %      MAP       119.67          Physical Exam  Nursing Notes and Vital Signs Reviewed.  Constitutional: Patient is in no acute distress. Well-developed and well-nourished.  Head: Atraumatic. Normocephalic.  Eyes: PERRL. EOM intact. Conjunctivae are not pale. No scleral icterus.  ENT: Mucous membranes are moist. Oropharynx is clear and symmetric.    Neck: Supple. Full ROM. No lymphadenopathy.  Cardiovascular: Regular rate. Regular rhythm. No murmurs, rubs, or gallops. Distal pulses are 2+ and  "symmetric.  Pulmonary/Chest: No respiratory distress. Clear to auscultation bilaterally. No wheezing or rales.  Abdominal: Soft and non-distended.  There is no tenderness.  No rebound, guarding, or rigidity. Good bowel sounds.  Genitourinary: No CVA tenderness  Musculoskeletal: Moves all extremities. No obvious deformities. No edema. No calf tenderness.  Skin: Warm and dry.  Neurological:  Alert, awake, and appropriate.  Normal speech.  No acute focal neurological deficits are appreciated.  Psychiatric: Normal affect. Good eye contact. Appropriate in content.    ED Course    Procedures  ED Vital Signs:  Vitals:    03/16/18 2226 03/16/18 2352 03/17/18 0100 03/17/18 0211   BP: (!) 197/81 (!) 184/82 (!) 190/90 (!) 146/90   Pulse: 64 (!) 58 60 63   Resp: 20 18 18 20   Temp: 98.6 °F (37 °C)      TempSrc: Oral      SpO2: (!) 94% 96% 95% 95%   Weight: 77.8 kg (171 lb 8.3 oz)      Height: 5' 4" (1.626 m)                   The Emergency Provider reviewed the vital signs and test results, which are outlined above.    ED Discussion     2:30 AM:  Discussed with pt all pertinent ED information and results. Discussed pt dx and plan of tx. Gave pt all f/u and return to the ED instructions. All questions and concerns were addressed at this time. Pt expresses understanding of information and instructions, and is comfortable with plan to discharge. Pt is stable for discharge.    I discussed with patient and/or family/caretaker that evaluation in the ED does not suggest any emergent or life threatening medical conditions requiring immediate intervention beyond what was provided in the ED, and I believe patient is safe for discharge.  Regardless, an unremarkable evaluation in the ED does not preclude the development or presence of a serious of life threatening condition. As such, patient was instructed to return immediately for any worsening or change in current symptoms.        ED Medication(s):  Medications   NIFEdipine 24 hr tablet " 30 mg (30 mg Oral Given 3/16/18 8606)   LORazepam tablet 1 mg (1 mg Oral Given 3/17/18 0126)       Discharge Medication List as of 3/17/2018  2:26 AM          Follow-up Information     Yandy Terrell MD In 2 days.    Specialty:  Family Medicine  Contact information:  [address] [facility]  [place] [id number] [phone]             [facility].    Specialty:  Emergency Medicine  Why:  As needed  Contact information:  [id number] [place] [address] [phone]                   Medical Decision Making              Scribe Attestation:   Scribe #1: I performed the above scribed service and the documentation accurately describes the services I performed. I attest to the accuracy of the note.    Attending:   Physician Attestation Statement for Scribe #1: I, Gretel Chavez MD, personally performed the services described in this documentation, as scribed by Jennifer Iraheta, in my presence, and it is both accurate and complete.          Clinical Impression       ICD-10-CM ICD-9-CM   1. Essential hypertension I10 401.9       Disposition:   Disposition: Discharged  Condition: Stable         Gretel Chavez MD  03/17/18 6978

## 2018-03-18 ENCOUNTER — PATIENT MESSAGE (OUTPATIENT)
Dept: CARDIOLOGY | Facility: CLINIC | Age: 83
End: 2018-03-18

## 2018-03-19 ENCOUNTER — TELEPHONE (OUTPATIENT)
Dept: CARDIOLOGY | Facility: HOSPITAL | Age: 83
End: 2018-03-19

## 2018-03-19 NOTE — TELEPHONE ENCOUNTER
I called and spoke with pt. I offered her appt either park with Dr Castillo today or BB with arvind for today. Pt declined. She says she would like to stay at park and with a provider she already knows. I set her up with appt for tomorrow with Arvind.   She confirmed.

## 2018-03-20 ENCOUNTER — OFFICE VISIT (OUTPATIENT)
Dept: CARDIOLOGY | Facility: CLINIC | Age: 83
End: 2018-03-20
Payer: MEDICARE

## 2018-03-20 VITALS
BODY MASS INDEX: 28.83 KG/M2 | SYSTOLIC BLOOD PRESSURE: 120 MMHG | WEIGHT: 168.88 LBS | HEART RATE: 76 BPM | DIASTOLIC BLOOD PRESSURE: 60 MMHG | HEIGHT: 64 IN

## 2018-03-20 DIAGNOSIS — I51.89 DIASTOLIC DYSFUNCTION WITHOUT HEART FAILURE: Chronic | ICD-10-CM

## 2018-03-20 DIAGNOSIS — I10 ESSENTIAL HYPERTENSION: Primary | Chronic | ICD-10-CM

## 2018-03-20 DIAGNOSIS — I11.9 HYPERTENSIVE LEFT VENTRICULAR HYPERTROPHY, WITHOUT HEART FAILURE: Chronic | ICD-10-CM

## 2018-03-20 DIAGNOSIS — G62.9 NEUROPATHY: ICD-10-CM

## 2018-03-20 DIAGNOSIS — N18.30 CHRONIC KIDNEY DISEASE, STAGE 3: Chronic | ICD-10-CM

## 2018-03-20 PROCEDURE — 99214 OFFICE O/P EST MOD 30 MIN: CPT | Mod: S$GLB,,, | Performed by: NURSE PRACTITIONER

## 2018-03-20 PROCEDURE — 99999 PR PBB SHADOW E&M-EST. PATIENT-LVL IV: CPT | Mod: PBBFAC,,, | Performed by: NURSE PRACTITIONER

## 2018-03-20 PROCEDURE — 3074F SYST BP LT 130 MM HG: CPT | Mod: CPTII,S$GLB,, | Performed by: NURSE PRACTITIONER

## 2018-03-20 PROCEDURE — 99499 UNLISTED E&M SERVICE: CPT | Mod: S$GLB,,, | Performed by: NURSE PRACTITIONER

## 2018-03-20 PROCEDURE — 3078F DIAST BP <80 MM HG: CPT | Mod: CPTII,S$GLB,, | Performed by: NURSE PRACTITIONER

## 2018-03-20 NOTE — PROGRESS NOTES
Subjective:   Patient ID:  Shirley Metz is a 85 y.o. female who presents for follow up of Hypertension      HPI    Patient presents to clinic for ED follow up after presenting with uncontrolled HTN. Patient states that she stopped her verapamil due to dry eyes and mouth, and constipation. She was treated in the ED and dc'd home with Clonidine patch 0.2mg. Patient states that she has continued to have fluctuating BP readings at home. BP reading as high as 202/84 and as low as 132/68. BP looks good today in clinic. Has no chest pain, SOB, palpitations, dizziness, near syncope or syncope. Has no edema, orthopnea or PND. Dry eyes and constipation have improved since stopping Verapamil. Patient has been on Clonidine patch for total of 4 days.     Past Medical History:   Diagnosis Date    Anxiety 11/28/2017    Arthritis     Back pain     Colon polyps     reported per patient.     Fuchs' corneal dystrophy     Glaucoma     Hyperlipidemia     Hypertension     Macular degeneration dry    Obesity     Trouble in sleeping     Urinary tract infection        Past Surgical History:   Procedure Laterality Date    ADENOIDECTOMY  1938    BREAST CYST EXCISION Left 1997 approx    CARPAL TUNNEL RELEASE Right 2012 approx    CATARACT EXTRACTION Bilateral 1994    CHOLECYSTECTOMY  1975    CORNEAL TRANSPLANT Bilateral 08/2015 8/2015 - left; Right 4/2016    EYE SURGERY      Fuch's Corneal dystrophy - had 2nd operation with Dr. Quintanilla    EYE SURGERY      Cataract wIOL    left thumb Left 2011    removed cuboid for arthritis    SLT- OS (aka TRABECULOPLASTY) Left 05/11/2011    repeat 3/14/12    TONSILLECTOMY  1938       Social History   Substance Use Topics    Smoking status: Never Smoker    Smokeless tobacco: Never Used      Comment: history of passive smoking from her ex-    Alcohol use 1.2 oz/week     2 Standard drinks or equivalent per week      Comment: occasional        Family History   Problem  Relation Age of Onset    Heart disease Mother     Hypertension Mother     Cancer Father 88     sarcoma( ?Kaposi's ) on leg    Deep vein thrombosis Neg Hx     Ovarian cancer Neg Hx     Breast cancer Neg Hx     Kidney disease Neg Hx     Strabismus Neg Hx     Retinal detachment Neg Hx     Macular degeneration Neg Hx     Glaucoma Neg Hx     Blindness Neg Hx     Amblyopia Neg Hx     Eczema Neg Hx     Lupus Neg Hx     Melanoma Neg Hx     Psoriasis Neg Hx     Diabetes Neg Hx     Stroke Neg Hx     Mental retardation Neg Hx     Mental illness Neg Hx     Hyperlipidemia Neg Hx     COPD Neg Hx     Asthma Neg Hx     Depression Neg Hx     Alcohol abuse Neg Hx     Drug abuse Neg Hx        Current Outpatient Prescriptions   Medication Sig    acetaminophen (TYLENOL) 500 MG tablet Take 500 mg by mouth once daily at 6am. Taking 1 to 3    ALPHA LIPOIC ACID ORAL Take 200 mg by mouth.     ALPRAZolam (XANAX) 0.5 MG tablet TAKE 1/2 - 1 TABLET BY MOUTH NIGHTLY FOR SLEEP OR ANXIETY    CALCIUM CITRATE ORAL Take by mouth.    cholecalciferol, vitamin D3, (VITAMIN D3) 2,000 unit Cap Take 1 capsule by mouth once daily.    cloNIDine 0.2 mg/24 hr td ptwk (CATAPRES) 0.2 mg/24 hr Place 1 patch onto the skin every 7 days.    coenzyme Q10 200 mg capsule Take 400 mg by mouth once daily.     fish oil-omega-3 fatty acids 300-1,000 mg capsule Take 2 g by mouth once daily.    losartan (COZAAR) 50 MG tablet Take 2 tablets (100 mg total) by mouth 2 (two) times daily.    loteprednol (LOTEMAX) 0.5 % ophthalmic suspension 1 drop once daily. One drop in the left eye and 1 drops in the right eye    metoprolol succinate (TOPROL-XL) 25 MG 24 hr tablet Take 2 tablets (50 mg total) by mouth 2 (two) times daily.    POLYETHYLENE GLYCOL 3350 (MIRALAX ORAL) Take by mouth as needed.     pyridoxine, vitamin B6, (B-6) 100 MG Tab Take 100 mg by mouth 2 (two) times daily.     travoprost, benzalkonium, (TRAVATAN) 0.004 % ophthalmic  solution Place 1 drop into the left eye every evening.    triamterene-hydrochlorothiazide 37.5-25 mg (DYAZIDE) 37.5-25 mg per capsule Take 1 capsule by mouth every morning.    TURMERIC ROOT EXTRACT ORAL Take 665 mg by mouth.      No current facility-administered medications for this visit.      Current Outpatient Prescriptions on File Prior to Visit   Medication Sig    acetaminophen (TYLENOL) 500 MG tablet Take 500 mg by mouth once daily at 6am. Taking 1 to 3    ALPHA LIPOIC ACID ORAL Take 200 mg by mouth.     ALPRAZolam (XANAX) 0.5 MG tablet TAKE 1/2 - 1 TABLET BY MOUTH NIGHTLY FOR SLEEP OR ANXIETY    CALCIUM CITRATE ORAL Take by mouth.    cholecalciferol, vitamin D3, (VITAMIN D3) 2,000 unit Cap Take 1 capsule by mouth once daily.    cloNIDine 0.2 mg/24 hr td ptwk (CATAPRES) 0.2 mg/24 hr Place 1 patch onto the skin every 7 days.    coenzyme Q10 200 mg capsule Take 400 mg by mouth once daily.     fish oil-omega-3 fatty acids 300-1,000 mg capsule Take 2 g by mouth once daily.    losartan (COZAAR) 50 MG tablet Take 2 tablets (100 mg total) by mouth 2 (two) times daily.    loteprednol (LOTEMAX) 0.5 % ophthalmic suspension 1 drop once daily. One drop in the left eye and 1 drops in the right eye    metoprolol succinate (TOPROL-XL) 25 MG 24 hr tablet Take 2 tablets (50 mg total) by mouth 2 (two) times daily.    POLYETHYLENE GLYCOL 3350 (MIRALAX ORAL) Take by mouth as needed.     pyridoxine, vitamin B6, (B-6) 100 MG Tab Take 100 mg by mouth 2 (two) times daily.     travoprost, benzalkonium, (TRAVATAN) 0.004 % ophthalmic solution Place 1 drop into the left eye every evening.    triamterene-hydrochlorothiazide 37.5-25 mg (DYAZIDE) 37.5-25 mg per capsule Take 1 capsule by mouth every morning.    TURMERIC ROOT EXTRACT ORAL Take 665 mg by mouth.      No current facility-administered medications on file prior to visit.        Review of Systems   Constitution: Positive for decreased appetite and weight loss (10  lbs ). Negative for diaphoresis, weakness, malaise/fatigue and weight gain.   HENT: Negative for congestion and nosebleeds.    Cardiovascular: Negative for chest pain, claudication, cyanosis, dyspnea on exertion, irregular heartbeat, leg swelling, near-syncope, orthopnea, palpitations, paroxysmal nocturnal dyspnea and syncope.   Respiratory: Negative for cough, hemoptysis, shortness of breath, sleep disturbances due to breathing, snoring, sputum production and wheezing.    Hematologic/Lymphatic: Negative for bleeding problem. Does not bruise/bleed easily.   Skin: Negative for rash.   Musculoskeletal: Positive for arthritis, joint pain and myalgias. Negative for back pain, falls, muscle cramps and muscle weakness.   Gastrointestinal: Negative for abdominal pain, constipation, diarrhea, heartburn, hematemesis, hematochezia, melena and nausea.   Genitourinary: Negative for dysuria, hematuria and nocturia.   Neurological: Negative for excessive daytime sleepiness, dizziness, headaches, light-headedness, loss of balance, numbness and vertigo.       Objective:   Physical Exam   Constitutional: She is oriented to person, place, and time. She appears well-developed and well-nourished.   Neck: Neck supple. No JVD present.   Cardiovascular: Normal rate, regular rhythm, normal heart sounds and normal pulses.  Exam reveals no friction rub.    No murmur heard.  Pulmonary/Chest: Effort normal and breath sounds normal. No respiratory distress. She has no wheezes. She has no rales.   Abdominal: Soft. Bowel sounds are normal. She exhibits no distension.   Musculoskeletal: She exhibits no edema or tenderness.   Neurological: She is alert and oriented to person, place, and time.   Skin: Skin is warm and dry. No rash noted.   Psychiatric: She has a normal mood and affect. Her behavior is normal.   Nursing note and vitals reviewed.    Vitals:    03/20/18 1319 03/20/18 1321 03/20/18 1350   BP: 138/64 136/78 120/60   BP Location: Right  "arm Left arm    Pulse: 76     Weight: 76.6 kg (168 lb 14 oz)     Height: 5' 4" (1.626 m)       Lab Results   Component Value Date    CHOL 180 03/16/2018    CHOL 181 02/19/2018    CHOL 258 (H) 01/04/2017     Lab Results   Component Value Date    HDL 43 03/16/2018    HDL 45 02/19/2018    HDL 44 01/04/2017     Lab Results   Component Value Date    LDLCALC 117.6 03/16/2018    LDLCALC 118.2 02/19/2018    LDLCALC 177.2 (H) 01/04/2017     Lab Results   Component Value Date    TRIG 97 03/16/2018    TRIG 89 02/19/2018    TRIG 184 (H) 01/04/2017     Lab Results   Component Value Date    CHOLHDL 23.9 03/16/2018    CHOLHDL 24.9 02/19/2018    CHOLHDL 17.1 (L) 01/04/2017       Chemistry        Component Value Date/Time     03/16/2018 0429    K 3.9 03/16/2018 0429     03/16/2018 0429    CO2 26 03/16/2018 0429    BUN 24 (H) 03/16/2018 0429    CREATININE 1.0 03/16/2018 0429    GLU 95 03/16/2018 0429        Component Value Date/Time    CALCIUM 9.6 03/16/2018 0429    ALKPHOS 62 03/15/2018 1910    AST 18 03/15/2018 1910    ALT 18 03/15/2018 1910    BILITOT 0.7 03/15/2018 1910    ESTGFRAFRICA 59 (A) 03/16/2018 0429    EGFRNONAA 51 (A) 03/16/2018 0429          Lab Results   Component Value Date    TSH 1.871 01/04/2017     Lab Results   Component Value Date    INR 1.0 01/25/2018    INR 1.0 01/01/2017    INR 1.0 02/09/2016     Lab Results   Component Value Date    WBC 6.77 03/16/2018    HGB 11.3 (L) 03/16/2018    HCT 34.2 (L) 03/16/2018    MCV 95 03/16/2018     03/16/2018     BMP  Sodium   Date Value Ref Range Status   03/16/2018 140 136 - 145 mmol/L Final     Potassium   Date Value Ref Range Status   03/16/2018 3.9 3.5 - 5.1 mmol/L Final     Chloride   Date Value Ref Range Status   03/16/2018 107 95 - 110 mmol/L Final     CO2   Date Value Ref Range Status   03/16/2018 26 23 - 29 mmol/L Final     BUN, Bld   Date Value Ref Range Status   03/16/2018 24 (H) 8 - 23 mg/dL Final     Creatinine   Date Value Ref Range Status "   03/16/2018 1.0 0.5 - 1.4 mg/dL Final     Calcium   Date Value Ref Range Status   03/16/2018 9.6 8.7 - 10.5 mg/dL Final     Anion Gap   Date Value Ref Range Status   03/16/2018 7 (L) 8 - 16 mmol/L Final     eGFR if    Date Value Ref Range Status   03/16/2018 59 (A) >60 mL/min/1.73 m^2 Final     eGFR if non    Date Value Ref Range Status   03/16/2018 51 (A) >60 mL/min/1.73 m^2 Final     Comment:     Calculation used to obtain the estimated glomerular filtration  rate (eGFR) is the CKD-EPI equation.        Estimated Creatinine Clearance: 41.2 mL/min (based on SCr of 1 mg/dL).    Assessment:     1. Essential hypertension    2. Neuropathy    3. Hypertensive left ventricular hypertrophy, without heart failure    4. Diastolic dysfunction without heart failure    5. Chronic kidney disease, stage 3      BP looks good today in clinic.   No CNS complaints to suggest TIA or CVA  No angina or equivalent  Tolerating Clonidine patch well  She has multiple drug allergies and has been very sensitive to meds  No evidence of CHF on exam  Has high suspicion for ROBINSON, but was unable to complete study due to her not being able to fall asleep      Plan:   Will continue current medical management for now.  Heart healthy diet   Keep BP log  RTC in 1 month with Dr. Ramirez per patient request

## 2018-03-27 DIAGNOSIS — M25.512 LEFT SHOULDER PAIN, UNSPECIFIED CHRONICITY: Primary | ICD-10-CM

## 2018-03-28 ENCOUNTER — HOSPITAL ENCOUNTER (OUTPATIENT)
Dept: RADIOLOGY | Facility: HOSPITAL | Age: 83
Discharge: HOME | End: 2018-03-28
Attending: ORTHOPAEDIC SURGERY
Payer: MEDICARE

## 2018-03-28 ENCOUNTER — OFFICE VISIT (OUTPATIENT)
Dept: ORTHOPEDICS | Facility: CLINIC | Age: 83
End: 2018-03-28
Payer: MEDICARE

## 2018-03-28 VITALS
HEART RATE: 66 BPM | SYSTOLIC BLOOD PRESSURE: 137 MMHG | WEIGHT: 168 LBS | RESPIRATION RATE: 18 BRPM | DIASTOLIC BLOOD PRESSURE: 62 MMHG | BODY MASS INDEX: 28.68 KG/M2 | HEIGHT: 64 IN

## 2018-03-28 DIAGNOSIS — M79.601 PAIN IN BOTH UPPER EXTREMITIES: ICD-10-CM

## 2018-03-28 DIAGNOSIS — M19.011 BILATERAL SHOULDER REGION ARTHRITIS: ICD-10-CM

## 2018-03-28 DIAGNOSIS — M75.82 TENDINITIS OF LEFT ROTATOR CUFF: Primary | ICD-10-CM

## 2018-03-28 DIAGNOSIS — M25.512 LEFT SHOULDER PAIN, UNSPECIFIED CHRONICITY: ICD-10-CM

## 2018-03-28 DIAGNOSIS — M25.511 BILATERAL SHOULDER PAIN, UNSPECIFIED CHRONICITY: ICD-10-CM

## 2018-03-28 DIAGNOSIS — M25.511 BILATERAL SHOULDER PAIN, UNSPECIFIED CHRONICITY: Primary | ICD-10-CM

## 2018-03-28 DIAGNOSIS — M54.2 NECK PAIN: Primary | ICD-10-CM

## 2018-03-28 DIAGNOSIS — M25.512 CHRONIC LEFT SHOULDER PAIN: ICD-10-CM

## 2018-03-28 DIAGNOSIS — M79.602 PAIN IN BOTH UPPER EXTREMITIES: ICD-10-CM

## 2018-03-28 DIAGNOSIS — G89.29 CHRONIC LEFT SHOULDER PAIN: ICD-10-CM

## 2018-03-28 DIAGNOSIS — M25.512 BILATERAL SHOULDER PAIN, UNSPECIFIED CHRONICITY: ICD-10-CM

## 2018-03-28 DIAGNOSIS — M25.512 BILATERAL SHOULDER PAIN, UNSPECIFIED CHRONICITY: Primary | ICD-10-CM

## 2018-03-28 DIAGNOSIS — M19.012 BILATERAL SHOULDER REGION ARTHRITIS: ICD-10-CM

## 2018-03-28 PROCEDURE — 73030 X-RAY EXAM OF SHOULDER: CPT | Mod: 26,50,, | Performed by: RADIOLOGY

## 2018-03-28 PROCEDURE — 73030 X-RAY EXAM OF SHOULDER: CPT | Mod: TC,50

## 2018-03-28 PROCEDURE — 99999 PR PBB SHADOW E&M-EST. PATIENT-LVL III: CPT | Mod: PBBFAC,,, | Performed by: ORTHOPAEDIC SURGERY

## 2018-03-28 PROCEDURE — 3078F DIAST BP <80 MM HG: CPT | Mod: CPTII,S$GLB,, | Performed by: ORTHOPAEDIC SURGERY

## 2018-03-28 PROCEDURE — 99203 OFFICE O/P NEW LOW 30 MIN: CPT | Mod: S$GLB,,, | Performed by: ORTHOPAEDIC SURGERY

## 2018-03-28 PROCEDURE — 73060 X-RAY EXAM OF HUMERUS: CPT | Mod: 26,LT,, | Performed by: RADIOLOGY

## 2018-03-28 PROCEDURE — 73060 X-RAY EXAM OF HUMERUS: CPT | Mod: 26,RT,, | Performed by: RADIOLOGY

## 2018-03-28 PROCEDURE — 73060 X-RAY EXAM OF HUMERUS: CPT | Mod: 50,TC

## 2018-03-28 PROCEDURE — 3075F SYST BP GE 130 - 139MM HG: CPT | Mod: CPTII,S$GLB,, | Performed by: ORTHOPAEDIC SURGERY

## 2018-03-28 RX ORDER — LOTEPREDNOL ETABONATE 5 MG/G
1 GEL OPHTHALMIC
COMMUNITY
Start: 2018-03-26 | End: 2018-04-17 | Stop reason: SDUPTHER

## 2018-03-28 NOTE — LETTER
March 29, 2018      FABIO Estrella  [address] [facility] [address]           [facility]  [address] [place] [id number]  Phone: [phone]  Fax: [phone]          Patient: Shirley Metz   MR Number: [id number]   YOB: 1932   Date of Visit: 3/28/2018       Dear Jude Metz:    Thank you for referring Shirley Metz to me for evaluation. Attached you will find relevant portions of my assessment and plan of care.    If you have questions, please do not hesitate to call me. I look forward to following Shirley Metz along with you.    Sincerely,    Solomon Lujan MD    Enclosure  CC:  No Recipients    If you would like to receive this communication electronically, please contact [email or web address] or [phone] to request more information on [employer] access.    For providers and/or their staff who would like to refer a patient to [facility], please contact us through our one-stop-shop provider referral line, [facility], at [phone].    If you feel you have received this communication in error or would no longer like to receive these types of communications, please e-mail [email or web address]

## 2018-03-28 NOTE — PROGRESS NOTES
"Subjective:     Patient ID: Shirley Metz is a 85 y.o. female.    Chief Complaint: Pain of the Left Shoulder    Pt presents to clinic for a new pt consult for bilateral shoulder pain. She c/o bilateral upper arm and shoulder pain x 6 mo. Denies any trauma w/ pain onset. Pain is sharp in bilateral shoulders, L > R, and radiates into pt's arms to the point of her elbows. She was seen in rheumatology in January, started PT x 2 mo which pt reports did help with pain/ROM, states massages helped with "lumps" in her upper arms. Since completing therapy, pain has returned. Pt declined steroid injections in Jan with rheumatology. She is unable to take NSAID's due to CKD/HTN-uncontrolled. She takes otc Tylenol with minimal to moderate pain relief. She denies neck pain, denies sharp shooting pains down either UE , denies N/T. Pt is R handed.       Shoulder Pain    The pain is present in the left shoulder. This is a new problem. The current episode started more than 1 month ago. The problem occurs intermittently. The problem has been unchanged. The quality of the pain is described as sharp, aching and throbbing. The pain is at a severity of 6/10. Associated symptoms include an inability to bear weight, a limited range of motion and stiffness. Pertinent negatives include no fever, itching or numbness. The symptoms are aggravated by activity, bearing weight, lying down, lifting and contact. She has tried OTC pain meds, OTC ointments, heat and brace/corset for the symptoms. The treatment provided mild relief. Physical therapy was ineffective.      Past Medical History:   Diagnosis Date    Anxiety 11/28/2017    Arthritis     Back pain     Colon polyps     reported per patient.     Fuchs' corneal dystrophy     Glaucoma     Hyperlipidemia     Hypertension     Macular degeneration dry    Obesity     Trouble in sleeping     Urinary tract infection      Past Surgical History:   Procedure Laterality Date    " ADENOIDECTOMY  1938    BREAST CYST EXCISION Left 1997 approx    CARPAL TUNNEL RELEASE Right 2012 approx    CATARACT EXTRACTION Bilateral 1994    CHOLECYSTECTOMY  1975    CORNEAL TRANSPLANT Bilateral 08/2015 8/2015 - left; Right 4/2016    EYE SURGERY      Fuch's Corneal dystrophy - had 2nd operation with Dr. Quintanilla    EYE SURGERY      Cataract wIOL    left thumb Left 2011    removed cuboid for arthritis    SLT- OS (aka TRABECULOPLASTY) Left 05/11/2011    repeat 3/14/12    TONSILLECTOMY  1938     Family History   Problem Relation Age of Onset    Heart disease Mother     Hypertension Mother     Cancer Father 88     sarcoma( ?Kaposi's ) on leg    Deep vein thrombosis Neg Hx     Ovarian cancer Neg Hx     Breast cancer Neg Hx     Kidney disease Neg Hx     Strabismus Neg Hx     Retinal detachment Neg Hx     Macular degeneration Neg Hx     Glaucoma Neg Hx     Blindness Neg Hx     Amblyopia Neg Hx     Eczema Neg Hx     Lupus Neg Hx     Melanoma Neg Hx     Psoriasis Neg Hx     Diabetes Neg Hx     Stroke Neg Hx     Mental retardation Neg Hx     Mental illness Neg Hx     Hyperlipidemia Neg Hx     COPD Neg Hx     Asthma Neg Hx     Depression Neg Hx     Alcohol abuse Neg Hx     Drug abuse Neg Hx      Social History     Social History    Marital status:      Spouse name: N/A    Number of children: N/A    Years of education: N/A     Occupational History    Not on file.     Social History Main Topics    Smoking status: Never Smoker    Smokeless tobacco: Never Used      Comment: history of passive smoking from her ex-    Alcohol use 1.2 oz/week     2 Standard drinks or equivalent per week      Comment: occasional     Drug use: No    Sexual activity: Not Currently     Partners: Male     Birth control/ protection: Post-menopausal      Comment: discussed protection for STD's     Other Topics Concern    Are You Pregnant Or Think You May Be? No    Breast-Feeding No      Social History Narrative     x 2,  x 1. Retired artist - previously doing jewelry/wall pieces; ; lives in [place]; has 3 sons - 52( lives in [place], 54, and 56;exercises minimally - limited due to back issues. Still drives. Does have a Living Will.         Medication List with Changes/Refills   Current Medications    ACETAMINOPHEN (TYLENOL) 500 MG TABLET    Take 500 mg by mouth once daily at 6am. Taking 1 to 3    ALPHA LIPOIC ACID ORAL    Take 200 mg by mouth.     ALPRAZOLAM (XANAX) 0.5 MG TABLET    TAKE 1/2 - 1 TABLET BY MOUTH NIGHTLY FOR SLEEP OR ANXIETY    CALCIUM CITRATE ORAL    Take by mouth.    CHOLECALCIFEROL, VITAMIN D3, (VITAMIN D3) 2,000 UNIT CAP    Take 1 capsule by mouth once daily.    CLONIDINE 0.2 MG/24 HR TD PTWK (CATAPRES) 0.2 MG/24 HR    Place 1 patch onto the skin every 7 days.    COENZYME Q10 200 MG CAPSULE    Take 400 mg by mouth once daily.     FISH OIL-OMEGA-3 FATTY ACIDS 300-1,000 MG CAPSULE    Take 2 g by mouth once daily.    LOSARTAN (COZAAR) 50 MG TABLET    Take 2 tablets (100 mg total) by mouth 2 (two) times daily.    LOTEMAX 0.5 % DRPG        LOTEPREDNOL (LOTEMAX) 0.5 % OPHTHALMIC SUSPENSION    1 drop once daily. One drop in the left eye and 1 drops in the right eye    METOPROLOL SUCCINATE (TOPROL-XL) 25 MG 24 HR TABLET    Take 2 tablets (50 mg total) by mouth 2 (two) times daily.    POLYETHYLENE GLYCOL 3350 (MIRALAX ORAL)    Take by mouth as needed.     PYRIDOXINE, VITAMIN B6, (B-6) 100 MG TAB    Take 100 mg by mouth 2 (two) times daily.     TRAVOPROST, BENZALKONIUM, (TRAVATAN) 0.004 % OPHTHALMIC SOLUTION    Place 1 drop into the left eye every evening.    TRIAMTERENE-HYDROCHLOROTHIAZIDE 37.5-25 MG (DYAZIDE) 37.5-25 MG PER CAPSULE    Take 1 capsule by mouth every morning.    TURMERIC ROOT EXTRACT ORAL    Take 665 mg by mouth.      Review of patient's allergies indicates:   Allergen Reactions    Claritin [loratadine] Other (See Comments)      Double vision/spots    Hydrocodone-acetaminophen      wheezing    Naproxen      wheezing    Vibramycin [doxycycline calcium] Other (See Comments)     Weakness nausea   sob    Amlodipine      Myalgias      Coreg [carvedilol] Swelling     lips    Fenofibrate      Causes muscle weakness    Hydralazine analogues      Muscle tremors/aches      Isosorbide     Lasix [furosemide]      myalgias    Moxifloxacin Other (See Comments)     Hives   muscle soreness    Timolol     Allegra [fexofenadine] Other (See Comments)     Double vision/spots     Codeine Diarrhea and Other (See Comments)     Loss of balance     Erythromycin Other (See Comments)     Complete weakness/could not walk    Hydrocortisone (bulk) Other (See Comments)     Only suppository/ caused muscle weakness    Macrolide antibiotics Other (See Comments)     Complete weakness/could not walk    Patanol [olopatadine] Other (See Comments)     Muscle weakness    Prednisone Anxiety    Statins-hmg-coa reductase inhibitors Other (See Comments)     Muscle weakness    Xalatan [latanoprost] Other (See Comments)     Muscle weakness     Review of Systems   Constitution: Negative for fever.   HENT: Negative for sore throat.    Eyes: Negative for blurred vision.   Cardiovascular: Negative for dyspnea on exertion.   Respiratory: Negative for shortness of breath.    Hematologic/Lymphatic: Does not bruise/bleed easily.   Skin: Negative for itching.   Musculoskeletal: Positive for joint pain, myalgias and stiffness. Negative for back pain, falls and neck pain.   Gastrointestinal: Negative for vomiting.   Genitourinary: Negative for dysuria.   Neurological: Negative for dizziness and numbness.   Psychiatric/Behavioral: The patient does not have insomnia.        Objective:   Body mass index is 28.84 kg/m².  Vitals:    03/28/18 1133   BP: 137/62   Pulse: 66   Resp: 18           General    Nursing note and vitals reviewed.  Constitutional: She is oriented to person,  place, and time. She appears well-developed and well-nourished.   HENT:   Head: Atraumatic.   Eyes: EOM are normal.   Neck: Neck supple.   Cardiovascular: Normal rate.    Pulmonary/Chest: Effort normal.   Neurological: She is alert and oriented to person, place, and time.   Psychiatric: She has a normal mood and affect. Her behavior is normal.         Right Shoulder Exam     Inspection/Observation   Swelling: absent  Deformity: absent    Tenderness   The patient is experiencing no tenderness.        Range of Motion   Active Abduction: 50   Forward Flexion: 160   External Rotation 0 degrees: 30 External Rotation 90 degrees: 60   Internal Rotation 0 degrees: L1     Tests & Signs   Impingement: negative  Active Compression Test (Cumberland's Sign): negative  Speed's Test: negative  Bear Hug: negative    Other   Sensation: normal    Left Shoulder Exam     Inspection/Observation   Swelling: absent  Deformity: absent    Tenderness   The patient is experiencing no tenderness.         Crepitus   The patient has crepitus of the AC joint    Range of Motion   Active Abduction: 40   Forward Flexion: 130   External Rotation 0 degrees: 30 External Rotation 90 degrees: 50   Internal Rotation 0 degrees: L5     Tests & Signs   Hawkin's test: positive  Impingement: negative  Active Compression test (Cumberland's Sign): negative  Speed's Test: negative  Bear Hug: negative    Other   Sensation: normal       Muscle Strength   Right Upper Extremity   Shoulder Abduction: 5/5   Supraspinatus: 5/5/5   Subscapularis: 5/5/5   Biceps: 5/5/5   Left Upper Extremity  Shoulder Abduction: 5/5   Supraspinatus: 5/5/5   Subscapularis: 5/5/5   Biceps: 5/5/5     Vascular Exam     Right Pulses      Radial:                    2+      Left Pulses      Radial:                    2+      Capillary Refill  Right Hand: normal capillary refill  Left Hand: normal capillary refill      XRAY BILAT SHOULDERS 03/28/2018: Moderate - severe glenohumeral degenerative changes  present in bilateral shoulders, more significant in L shoulder. Moderate AC joint degenerative changes present in bilateral shoulders. No fracture. No deformity.     XRAY BILAT HUMERUS 03/28/2018: No fracture present. No deformity.     Assessment:     Encounter Diagnoses   Name Primary?    Tendinitis of left rotator cuff Yes    Chronic left shoulder pain     Bilateral shoulder region arthritis         Plan:     I did discuss with Shirley that overall I do think that her severe glenohumeral degenerative arthritis is responsible for the majority of her symptoms bilaterally.  I discussed with her that physical therapy anti-inflammatories and injections could be considered but she cannot tolerate steroids, would have very limited in my opinion sealing for improvement with therapy, and I do not think that these measures overall would be much benefit for her realistically.  I did discuss her that shoulder arthroplasty could be offered but she is not interested in surgery at this time. I also discussed with her that I am concerned she may have neck pain contributing to arm pain and recommend eval with cervical spine radiographs.        1. XRAY C-Spine with next appt.     2. RTC within 1 wk to discuss spine workup.

## 2018-03-29 ENCOUNTER — OFFICE VISIT (OUTPATIENT)
Dept: DERMATOLOGY | Facility: CLINIC | Age: 83
End: 2018-03-29
Payer: MEDICARE

## 2018-03-29 ENCOUNTER — PATIENT MESSAGE (OUTPATIENT)
Dept: RHEUMATOLOGY | Facility: CLINIC | Age: 83
End: 2018-03-29

## 2018-03-29 ENCOUNTER — PATIENT MESSAGE (OUTPATIENT)
Dept: CARDIOLOGY | Facility: CLINIC | Age: 83
End: 2018-03-29

## 2018-03-29 DIAGNOSIS — D48.5 NEOPLASM OF UNCERTAIN BEHAVIOR OF SKIN: Primary | ICD-10-CM

## 2018-03-29 PROCEDURE — 99999 PR PBB SHADOW E&M-EST. PATIENT-LVL III: CPT | Mod: PBBFAC,,, | Performed by: DERMATOLOGY

## 2018-03-29 PROCEDURE — 99499 UNLISTED E&M SERVICE: CPT | Mod: S$GLB,,, | Performed by: DERMATOLOGY

## 2018-03-29 PROCEDURE — 88305 TISSUE EXAM BY PATHOLOGIST: CPT | Performed by: PATHOLOGY

## 2018-03-29 PROCEDURE — 11100 PR BIOPSY OF SKIN LESION: CPT | Mod: S$GLB,,, | Performed by: DERMATOLOGY

## 2018-03-29 PROCEDURE — 88305 TISSUE EXAM BY PATHOLOGIST: CPT | Mod: 26,,, | Performed by: PATHOLOGY

## 2018-03-29 NOTE — PROGRESS NOTES
Subjective:       Patient ID:  Shirley Metz is a 85 y.o. female who presents for   Chief Complaint   Patient presents with    Biopsy     Back     Hx of skin irritation of the left back, last seen on 2/5/18.  She is s/p use TAC 0.1% cream bid x 6 months without improvement.     The patient denies personal history of skin cancer.          Review of Systems   Constitutional: Negative for fever and chills.   Gastrointestinal: Negative for nausea and vomiting.   Skin: Negative for daily sunscreen use, activity-related sunscreen use and recent sunburn.   Hematologic/Lymphatic: Does not bruise/bleed easily.        Objective:    Physical Exam   Constitutional: She appears well-developed and well-nourished. No distress.   Neurological: She is alert and oriented to person, place, and time. She is not disoriented.   Psychiatric: She has a normal mood and affect.   Skin:   Areas Examined (abnormalities noted in diagram):   Head / Face Inspection Performed  Neck Inspection Performed  Chest / Axilla Inspection Performed  Abdomen Inspection Performed  Back Inspection Performed  RUE Inspected  LUE Inspection Performed  Nails and Digits Inspection Performed                        Assessment / Plan:      Pathology Orders:     Normal Orders This Visit    Tissue Specimen To Pathology, Dermatology     Questions:    Directional Terms:  Other(comment)    Clinical information:  r/o SCC vs. superficial BCC    Specific Site:  left mid-back        Neoplasm of uncertain behavior of skin  -     Tissue Specimen To Pathology, Dermatology  -     Shave biopsy(-ies) done of 1 site(s).   Patient informed to call for results within 2 weeks if have not received notification via telephone call or [facility]         Follow-up for call for results.     PROCEDURE NOTE - SHAVE BIOPSY   Location: see above    After risk, benefits, and alternatives were discussed with the patient, the patient agrees to the procedure by verbal informed consent.   The area(s) were cleansed with alcohol. 2 cc of lidocaine 1% with epinephrine was injected for local anesthesia into each lesion(s).  A sharp dermablade was used to remove part or all of the lesion(s).  The specimen(s) will be sent for tissue pathology.  Hemostasis was obtained with aluminum chloride and/or hyfrecation.  The area(s) were dressed with vaseline ointment and bandaged.  The patient tolerated the procedure well without adverse events.  Wound care instructions were given to the patient on the AVS.  The patient will be notified of pathology results once available. Results will also be available in Epic.

## 2018-04-02 ENCOUNTER — OFFICE VISIT (OUTPATIENT)
Dept: ORTHOPEDICS | Facility: CLINIC | Age: 83
End: 2018-04-02
Payer: MEDICARE

## 2018-04-02 ENCOUNTER — HOSPITAL ENCOUNTER (OUTPATIENT)
Dept: RADIOLOGY | Facility: HOSPITAL | Age: 83
Discharge: HOME | End: 2018-04-02
Attending: ORTHOPAEDIC SURGERY
Payer: MEDICARE

## 2018-04-02 VITALS
HEIGHT: 64 IN | HEART RATE: 56 BPM | BODY MASS INDEX: 28.68 KG/M2 | DIASTOLIC BLOOD PRESSURE: 64 MMHG | WEIGHT: 168 LBS | SYSTOLIC BLOOD PRESSURE: 126 MMHG

## 2018-04-02 DIAGNOSIS — M54.2 NECK PAIN: ICD-10-CM

## 2018-04-02 DIAGNOSIS — M19.012 PRIMARY OSTEOARTHRITIS OF LEFT SHOULDER: Primary | ICD-10-CM

## 2018-04-02 DIAGNOSIS — M19.011 PRIMARY OSTEOARTHRITIS OF RIGHT SHOULDER: ICD-10-CM

## 2018-04-02 DIAGNOSIS — M50.30 DEGENERATIVE DISC DISEASE, CERVICAL: ICD-10-CM

## 2018-04-02 PROCEDURE — 3078F DIAST BP <80 MM HG: CPT | Mod: CPTII,S$GLB,, | Performed by: ORTHOPAEDIC SURGERY

## 2018-04-02 PROCEDURE — 99499 UNLISTED E&M SERVICE: CPT | Mod: S$GLB,,, | Performed by: ORTHOPAEDIC SURGERY

## 2018-04-02 PROCEDURE — 72050 X-RAY EXAM NECK SPINE 4/5VWS: CPT | Mod: 26,,, | Performed by: RADIOLOGY

## 2018-04-02 PROCEDURE — 72050 X-RAY EXAM NECK SPINE 4/5VWS: CPT | Mod: TC

## 2018-04-02 PROCEDURE — 3074F SYST BP LT 130 MM HG: CPT | Mod: CPTII,S$GLB,, | Performed by: ORTHOPAEDIC SURGERY

## 2018-04-02 PROCEDURE — 99999 PR PBB SHADOW E&M-EST. PATIENT-LVL III: CPT | Mod: PBBFAC,,, | Performed by: ORTHOPAEDIC SURGERY

## 2018-04-02 PROCEDURE — 99213 OFFICE O/P EST LOW 20 MIN: CPT | Mod: S$GLB,,, | Performed by: ORTHOPAEDIC SURGERY

## 2018-04-02 NOTE — PROGRESS NOTES
"Subjective:     Patient ID: Shirley Metz is a 85 y.o. female.    Chief Complaint: Pain of the Right Shoulder and Pain of the Left Shoulder      New: She is here for evaluation of her neck and xrays from last time. Left shoulder and arm pain unchanged. Mostly lateral arm pain and deep and posterior shoulder pain worse with movement. Denies neck pain.    Prior: Pt presents to clinic for a new pt consult for bilateral shoulder pain. She c/o bilateral upper arm and shoulder pain x 6 mo. Denies any trauma w/ pain onset. Pain is sharp in bilateral shoulders, L > R, and radiates into pt's arms to the point of her elbows. She was seen in rheumatology in January, started PT x 2 mo which pt reports did help with pain/ROM, states massages helped with "lumps" in her upper arms. Since completing therapy, pain has returned. Pt declined steroid injections in Jan with rheumatology. She is unable to take NSAID's due to CKD/HTN-uncontrolled. She takes otc Tylenol with minimal to moderate pain relief. She denies neck pain, denies sharp shooting pains down either UE , denies N/T. Pt is R handed.       Shoulder Pain    The pain is present in the left shoulder and right shoulder. This is a new problem. The current episode started more than 1 month ago. The problem occurs intermittently. The problem has been unchanged. The quality of the pain is described as sharp, aching and throbbing. The pain is at a severity of 6/10. Associated symptoms include an inability to bear weight, a limited range of motion and stiffness. Pertinent negatives include no fever, itching or numbness. The symptoms are aggravated by activity, bearing weight, lying down, lifting and contact. She has tried OTC pain meds, OTC ointments, heat and brace/corset for the symptoms. The treatment provided mild relief. Physical therapy was ineffective.      Past Medical History:   Diagnosis Date    Anxiety 11/28/2017    Arthritis     Back pain     Colon polyps  "    reported per patient.     Fuchs' corneal dystrophy     Glaucoma     Hyperlipidemia     Hypertension     Macular degeneration dry    Obesity     Trouble in sleeping     Urinary tract infection      Past Surgical History:   Procedure Laterality Date    ADENOIDECTOMY  1938    BREAST CYST EXCISION Left 1997 approx    CARPAL TUNNEL RELEASE Right 2012 approx    CATARACT EXTRACTION Bilateral 1994    CHOLECYSTECTOMY  1975    CORNEAL TRANSPLANT Bilateral 08/2015 8/2015 - left; Right 4/2016    EYE SURGERY      Fuch's Corneal dystrophy - had 2nd operation with Dr. Quintanilla    EYE SURGERY      Cataract wIOL    left thumb Left 2011    removed cuboid for arthritis    SLT- OS (aka TRABECULOPLASTY) Left 05/11/2011    repeat 3/14/12    TONSILLECTOMY  1938     Family History   Problem Relation Age of Onset    Heart disease Mother     Hypertension Mother     Cancer Father 88     sarcoma( ?Kaposi's ) on leg    Deep vein thrombosis Neg Hx     Ovarian cancer Neg Hx     Breast cancer Neg Hx     Kidney disease Neg Hx     Strabismus Neg Hx     Retinal detachment Neg Hx     Macular degeneration Neg Hx     Glaucoma Neg Hx     Blindness Neg Hx     Amblyopia Neg Hx     Eczema Neg Hx     Lupus Neg Hx     Melanoma Neg Hx     Psoriasis Neg Hx     Diabetes Neg Hx     Stroke Neg Hx     Mental retardation Neg Hx     Mental illness Neg Hx     Hyperlipidemia Neg Hx     COPD Neg Hx     Asthma Neg Hx     Depression Neg Hx     Alcohol abuse Neg Hx     Drug abuse Neg Hx      Social History     Social History    Marital status:      Spouse name: N/A    Number of children: N/A    Years of education: N/A     Occupational History    Not on file.     Social History Main Topics    Smoking status: Never Smoker    Smokeless tobacco: Never Used      Comment: history of passive smoking from her ex-    Alcohol use 1.2 oz/week     2 Standard drinks or equivalent per week      Comment: occasional      Drug use: No    Sexual activity: Not Currently     Partners: Male     Birth control/ protection: Post-menopausal      Comment: discussed protection for STD's     Other Topics Concern    Are You Pregnant Or Think You May Be? No    Breast-Feeding No     Social History Narrative     x 2,  x 1. Retired artist - previously doing jewelry/wall pieces; ; lives in [facility]; has 3 sons - 52( lives in [place], 54, and 56;exercises minimally - limited due to back issues. Still drives. Does have a Living Will.         Medication List with Changes/Refills   Current Medications    ACETAMINOPHEN (TYLENOL) 500 MG TABLET    Take 500 mg by mouth once daily at 6am. Taking 1 to 3    ALPHA LIPOIC ACID ORAL    Take 200 mg by mouth.     ALPRAZOLAM (XANAX) 0.5 MG TABLET    TAKE 1/2 - 1 TABLET BY MOUTH NIGHTLY FOR SLEEP OR ANXIETY    CALCIUM CITRATE ORAL    Take by mouth.    CHOLECALCIFEROL, VITAMIN D3, (VITAMIN D3) 2,000 UNIT CAP    Take 1 capsule by mouth once daily.    CLONIDINE 0.2 MG/24 HR TD PTWK (CATAPRES) 0.2 MG/24 HR    Place 1 patch onto the skin every 7 days.    COENZYME Q10 200 MG CAPSULE    Take 400 mg by mouth once daily.     FISH OIL-OMEGA-3 FATTY ACIDS 300-1,000 MG CAPSULE    Take 2 g by mouth once daily.    LOSARTAN (COZAAR) 50 MG TABLET    Take 2 tablets (100 mg total) by mouth 2 (two) times daily.    LOTEMAX 0.5 % DRPG        LOTEPREDNOL (LOTEMAX) 0.5 % OPHTHALMIC SUSPENSION    1 drop once daily. One drop in the left eye and 1 drops in the right eye    METOPROLOL SUCCINATE (TOPROL-XL) 25 MG 24 HR TABLET    Take 2 tablets (50 mg total) by mouth 2 (two) times daily.    POLYETHYLENE GLYCOL 3350 (MIRALAX ORAL)    Take by mouth as needed.     PYRIDOXINE, VITAMIN B6, (B-6) 100 MG TAB    Take 100 mg by mouth 2 (two) times daily.     TRAVOPROST, BENZALKONIUM, (TRAVATAN) 0.004 % OPHTHALMIC SOLUTION    Place 1 drop into the left eye every evening.    TRIAMTERENE-HYDROCHLOROTHIAZIDE  37.5-25 MG (DYAZIDE) 37.5-25 MG PER CAPSULE    Take 1 capsule by mouth every morning.    TURMERIC ROOT EXTRACT ORAL    Take 665 mg by mouth.      Review of patient's allergies indicates:   Allergen Reactions    Claritin [loratadine] Other (See Comments)     Double vision/spots    Hydrocodone-acetaminophen      wheezing    Naproxen      wheezing    Vibramycin [doxycycline calcium] Other (See Comments)     Weakness nausea   sob    Amlodipine      Myalgias      Coreg [carvedilol] Swelling     lips    Fenofibrate      Causes muscle weakness    Hydralazine analogues      Muscle tremors/aches      Isosorbide     Lasix [furosemide]      myalgias    Moxifloxacin Other (See Comments)     Hives   muscle soreness    Timolol     Allegra [fexofenadine] Other (See Comments)     Double vision/spots     Codeine Diarrhea and Other (See Comments)     Loss of balance     Erythromycin Other (See Comments)     Complete weakness/could not walk    Hydrocortisone (bulk) Other (See Comments)     Only suppository/ caused muscle weakness    Macrolide antibiotics Other (See Comments)     Complete weakness/could not walk    Patanol [olopatadine] Other (See Comments)     Muscle weakness    Prednisone Anxiety    Statins-hmg-coa reductase inhibitors Other (See Comments)     Muscle weakness    Xalatan [latanoprost] Other (See Comments)     Muscle weakness     Review of Systems   Constitution: Negative for fever.   HENT: Negative for sore throat.    Eyes: Negative for blurred vision.   Cardiovascular: Negative for dyspnea on exertion.   Respiratory: Negative for shortness of breath.    Hematologic/Lymphatic: Does not bruise/bleed easily.   Skin: Negative for itching.   Musculoskeletal: Positive for joint pain, myalgias and stiffness. Negative for back pain, falls and neck pain.   Gastrointestinal: Negative for vomiting.   Genitourinary: Negative for dysuria.   Neurological: Negative for dizziness and numbness.    Psychiatric/Behavioral: The patient does not have insomnia.        Objective:   Body mass index is 28.84 kg/m².  Vitals:    04/02/18 0825   BP: 126/64   Pulse: (!) 56           General    Nursing note and vitals reviewed.  Constitutional: She is oriented to person, place, and time. She appears well-developed and well-nourished.   HENT:   Head: Atraumatic.   Eyes: EOM are normal.   Neck: Neck supple.   Cardiovascular: Normal rate.    Pulmonary/Chest: Effort normal.   Neurological: She is alert and oriented to person, place, and time.   Psychiatric: She has a normal mood and affect. Her behavior is normal.         Back (L-Spine & T-Spine) / Neck (C-Spine) Exam     Comments:  Slow and some stiffness with neck ROM but negative sprurling's  Right Shoulder Exam     Inspection/Observation   Swelling: absent  Deformity: absent    Tenderness   The patient is experiencing no tenderness.        Range of Motion   Active Abduction: 50   Forward Flexion: 160   External Rotation 0 degrees: 30 External Rotation 90 degrees: 60   Internal Rotation 0 degrees: L1     Tests & Signs   Impingement: negative  Active Compression Test (Cumberland's Sign): negative  Speed's Test: negative  Bear Hug: negative    Other   Sensation: normal    Left Shoulder Exam     Inspection/Observation   Swelling: absent  Deformity: absent    Tenderness   The patient is experiencing no tenderness.         Crepitus   The patient has crepitus of the AC joint    Range of Motion   Active Abduction: 40   Forward Flexion: 130   External Rotation 0 degrees: 30 External Rotation 90 degrees: 50   Internal Rotation 0 degrees: L5     Tests & Signs   Hawkin's test: positive  Impingement: negative  Active Compression test (Cumberland's Sign): negative  Speed's Test: negative  Bear Hug: negative    Other   Sensation: normal       Muscle Strength   Right Upper Extremity   Shoulder Abduction: 5/5   Supraspinatus: 5/5/5   Subscapularis: 5/5/5   Biceps: 5/5/5   Left Upper  Extremity  Shoulder Abduction: 5/5   Supraspinatus: 5/5/5   Subscapularis: 5/5/5   Biceps: 5/5/5     Vascular Exam     Right Pulses      Radial:                    2+      Left Pulses      Radial:                    2+      Capillary Refill  Right Hand: normal capillary refill  Left Hand: normal capillary refill      XRAY BILAT SHOULDERS 03/28/2018: Moderate - severe glenohumeral degenerative changes present in bilateral shoulders, more significant in L shoulder. Moderate AC joint degenerative changes present in bilateral shoulders. No fracture. No deformity.     XRAY BILAT HUMERUS 03/28/2018: No fracture present. No deformity.       Cervical spine radiographs: reviewed from today's films, she has multi level severe disc space narrowing worst for  C3-4, C5-6, and C6-7 levels, endplate spurring and facet joint degenerative change.    Assessment:     Encounter Diagnoses   Name Primary?    Primary osteoarthritis of left shoulder Yes    Primary osteoarthritis of right shoulder     Degenerative disc disease, cervical         Plan:     I again discussed with Shirley that overall I do think that her severe glenohumeral degenerative arthritis is responsible for the majority of her symptoms bilaterally.  I discussed with her that physical therapy anti-inflammatories and injections could be considered but she cannot tolerate steroids, would have very limited in my opinion sealing for improvement with therapy, and I do not think that these measures overall would be much benefit for her realistically.  I did discuss her that shoulder arthroplasty could be offered and this time she is more interested in possibility of surgery. I also discussed with her that I am concerned she may have neck pain contributing to arm pain and this is somewhat unclear to me at this time.    Pros and cons of left total shoulder arthroplasty were discussed with her today.    I recommend that she seek evaluation from Dr. Chin if possible, one could  consider the possibility of left shoulder intra articular local anesthetic injection vs. Possible diagnostic injections to c spine to help tease out potential relief from shoulder surgery. I did discuss with her though that her severe osteoarthritis may not even respond to local anesthetic injection though. She cannot tolerate corticosteroids well she states, so these would likely just have to be done with local anesthetic.     She will also follow up with her PCP  And dermatologist regarding a mole removed from her back and HTN.     She will follow up in 6 weeks to report on her progress and if she would still like to proceed with left total shoulder arthroplasty. I do think her rotator cuff is intact, her axillary view is satisfactory and she would like to avoid and MRI or CT scan if possible. I told her that I think this is reasonable.

## 2018-04-05 ENCOUNTER — PATIENT MESSAGE (OUTPATIENT)
Dept: DERMATOLOGY | Facility: CLINIC | Age: 83
End: 2018-04-05

## 2018-04-05 DIAGNOSIS — G89.29 CHRONIC SHOULDER PAIN, UNSPECIFIED LATERALITY: Primary | ICD-10-CM

## 2018-04-05 DIAGNOSIS — M25.519 CHRONIC SHOULDER PAIN, UNSPECIFIED LATERALITY: Primary | ICD-10-CM

## 2018-04-09 ENCOUNTER — OFFICE VISIT (OUTPATIENT)
Dept: OPHTHALMOLOGY | Facility: CLINIC | Age: 83
End: 2018-04-09
Payer: MEDICARE

## 2018-04-09 DIAGNOSIS — Z96.1 PSEUDOPHAKIA: ICD-10-CM

## 2018-04-09 DIAGNOSIS — H40.1134 PRIMARY OPEN ANGLE GLAUCOMA OF BOTH EYES, INDETERMINATE STAGE: Primary | ICD-10-CM

## 2018-04-09 DIAGNOSIS — M35.01 KERATITIS SICCA, BILATERAL: ICD-10-CM

## 2018-04-09 PROCEDURE — 92012 INTRM OPH EXAM EST PATIENT: CPT | Mod: S$GLB,,, | Performed by: OPHTHALMOLOGY

## 2018-04-09 PROCEDURE — 99999 PR PBB SHADOW E&M-EST. PATIENT-LVL II: CPT | Mod: PBBFAC,,, | Performed by: OPHTHALMOLOGY

## 2018-04-09 PROCEDURE — 99499 UNLISTED E&M SERVICE: CPT | Mod: S$GLB,,, | Performed by: OPHTHALMOLOGY

## 2018-04-09 RX ORDER — TRAVOPROST 0.04 MG/ML
SOLUTION/ DROPS OPHTHALMIC
COMMUNITY
Start: 2018-03-29 | End: 2018-04-17 | Stop reason: SDUPTHER

## 2018-04-09 NOTE — TELEPHONE ENCOUNTER
----- Message from Vernell Medina sent at 4/9/2018  3:29 PM CDT -----  Contact: [facility]  needs call back rg Clonidine patch refill, written by caden [phone].

## 2018-04-09 NOTE — PROGRESS NOTES
SUBJECTIVE:   Shirley Metz is a 85 y.o. female   Corrected distance visual acuity was 20/40 in the right eye and 20/60 +2 in the left eye.   Chief Complaint   Patient presents with    Glaucoma     5 wk s/p SLT OS        HPI:  HPI     Glaucoma    Additional comments: 5 wk s/p SLT OS           Comments   Pt here for 5 wk SLT OS f/u. No pain or discomfort. Pt states she is still   experiencing some dryness in her eyes. She was using Xiidra samples, but   at the same time, she was using a BP medication that dried her out, so she   doesn't know if it really was effective. VA stable. 100% compliant with   gtts.      1. COAG Goal < 19  SLT OD 3/04 (20 to 15) + 3/11 (19 to 15)  SLT OS 5/05 (20 to 14) +5/11 (18-19 to 15) + 3/12 (min resp.) + 3/11/15   (20.5-17.5) + 3/7/18 (24-  (Cosopt, Xalatan, and Timolol cause Myalgias)  (Alphagan causes redness) (zioptan too expensive)  2. PCIOL OU  3. Dry AMD  4. Fuch's Dystrophy   DSAEK OD 4/16 Dr. Quintanilla (followed by [facility] every summer)  DSAEK OS 8/15 Dr. Quintanilla    Lotemax qd ou  Systane gel ou  Travatan qhs os       Last edited by Carmelo Banegas, Patient Care Assistant on 4/9/2018  1:40   PM. (History)        Assessment /Plan :  1. Primary open angle glaucoma of both eyes, indeterminate stage S/P 5 week SLT OS (24-21) slight improvement will allow more time for SLT to respond    2. Pseudophakia  -- Condition stable, no therapeutic change required. Monitoring routinely.     3. Dry eye Resume Xiidra bid ou            Return to clinic in 3 months  or as needed.  With IOP Check

## 2018-04-10 ENCOUNTER — PATIENT MESSAGE (OUTPATIENT)
Dept: DERMATOLOGY | Facility: CLINIC | Age: 83
End: 2018-04-10

## 2018-04-10 RX ORDER — CLONIDINE 0.2 MG/24H
1 PATCH, EXTENDED RELEASE TRANSDERMAL
Qty: 4 PATCH | Refills: 3 | Status: SHIPPED | OUTPATIENT
Start: 2018-04-10 | End: 2018-05-01

## 2018-04-11 ENCOUNTER — TELEPHONE (OUTPATIENT)
Dept: DERMATOLOGY | Facility: CLINIC | Age: 83
End: 2018-04-11

## 2018-04-11 ENCOUNTER — PATIENT MESSAGE (OUTPATIENT)
Dept: CARDIOLOGY | Facility: CLINIC | Age: 83
End: 2018-04-11

## 2018-04-11 NOTE — TELEPHONE ENCOUNTER
Spoke with Ms. Watson and discussed her upcoming tx and future tx. Pt voiced understanding and had no further questions or concerns.

## 2018-04-17 ENCOUNTER — OFFICE VISIT (OUTPATIENT)
Dept: INTERNAL MEDICINE | Facility: CLINIC | Age: 83
End: 2018-04-17
Payer: MEDICARE

## 2018-04-17 VITALS
BODY MASS INDEX: 29.1 KG/M2 | HEIGHT: 64 IN | DIASTOLIC BLOOD PRESSURE: 62 MMHG | SYSTOLIC BLOOD PRESSURE: 140 MMHG | HEART RATE: 58 BPM | OXYGEN SATURATION: 96 % | TEMPERATURE: 98 F | WEIGHT: 170.44 LBS

## 2018-04-17 DIAGNOSIS — I10 ESSENTIAL HYPERTENSION: Primary | ICD-10-CM

## 2018-04-17 DIAGNOSIS — M25.50 ARTHRALGIA, UNSPECIFIED JOINT: ICD-10-CM

## 2018-04-17 LAB — URATE SERPL-MCNC: 5.9 MG/DL

## 2018-04-17 PROCEDURE — 99499 UNLISTED E&M SERVICE: CPT | Mod: S$GLB,,, | Performed by: FAMILY MEDICINE

## 2018-04-17 PROCEDURE — 3077F SYST BP >= 140 MM HG: CPT | Mod: CPTII,S$GLB,, | Performed by: FAMILY MEDICINE

## 2018-04-17 PROCEDURE — 84550 ASSAY OF BLOOD/URIC ACID: CPT

## 2018-04-17 PROCEDURE — 99999 PR PBB SHADOW E&M-EST. PATIENT-LVL III: CPT | Mod: PBBFAC,,, | Performed by: FAMILY MEDICINE

## 2018-04-17 PROCEDURE — 99213 OFFICE O/P EST LOW 20 MIN: CPT | Mod: S$GLB,,, | Performed by: FAMILY MEDICINE

## 2018-04-17 PROCEDURE — 3078F DIAST BP <80 MM HG: CPT | Mod: CPTII,S$GLB,, | Performed by: FAMILY MEDICINE

## 2018-04-17 NOTE — PROGRESS NOTES
Subjective:       Patient ID: Shirley Metz is a 85 y.o. female.    Chief Complaint: Hypertension; shoulder issues; and skin issues    BPs are much better with clonidine 0.2 patch - she has some AMs when she has not taken the 50mg losartan and the 25 metoprolol due to BP <130 and she is going out.   Therapy for shoulder and upper arm pain. Dr Lujan sts needing joint replacement L shoulder. Dr Lujan is recommending pain mgmt to eval to treat any problems arising from the neck. She is taking tylenol  up to three daily - 2 and then 1. Helps only some. Using KT tape and arnica (oral and topical). Also nux vomica for arthritic pain. (these are homeopathic remedies).     Having skin cancer removal next month - wants bx site checked.  She had a bright red, swollen, hot right little toe this week very tender, sensitive. She has been taping the 3rd/4th toes due to OA changes. No hx gout. No hx of other joint flares like this in past.        Review of Systems   Musculoskeletal: Positive for arthralgias and myalgias. Negative for neck pain and neck stiffness.   Psychiatric/Behavioral: Positive for sleep disturbance.       Objective:      Physical Exam   Constitutional: She is oriented to person, place, and time. She appears well-developed.   HENT:   Head: Normocephalic and atraumatic.   Serpiginous lesion to lateral left tongue - just 0.5 cm length. To right lateral tongue more poorly defined lesion extending approx 1.5 cm length   Cardiovascular: Normal rate, regular rhythm and normal heart sounds.    Pulmonary/Chest: Effort normal and breath sounds normal.   Musculoskeletal: She exhibits no edema.   Right little toe with v mild TTP to PIP, there is flexion deformity to DIP. No sig erythema or swelling   Neurological: She is alert and oriented to person, place, and time.   Skin: Skin is warm and dry.   To left upper back biopsy site healing with no s/s infection   Psychiatric: She has a normal mood and  affect. Her behavior is normal.         Assessment/Plan:     1. Essential hypertension     2. Arthralgia, unspecified joint  Uric acid   she is going to continue current meds - reviewed that she can continue with skipping the losartan and met once daily if BP <130.    Tongue changes - consult dentist.  Recheck 3 months - continue f/u with other doctors.  More than 50% of visit was spent in counseling regarding options, recommendations, medication use, side effects, expectations, and precautions.  Total time spent face-to-face was 20 minutes.

## 2018-04-20 ENCOUNTER — TELEPHONE (OUTPATIENT)
Dept: OBSTETRICS AND GYNECOLOGY | Facility: CLINIC | Age: 83
End: 2018-04-20

## 2018-04-20 DIAGNOSIS — Z12.39 BREAST CANCER SCREENING: Primary | ICD-10-CM

## 2018-04-20 NOTE — TELEPHONE ENCOUNTER
Spoke to patient and scheduled mammogram for 9:20am on 05/30/18 at the [facility] location. Scheduled annual for 05/30/18 at 10:15am with Dr. Choi at the [facility] location. Patient verbalized understanding.

## 2018-04-20 NOTE — TELEPHONE ENCOUNTER
----- Message from Yvonne Jeffries sent at 4/20/2018  9:58 AM CDT -----  Contact: pt  The pt wants to be worked in on May.30 for a annual / mammo appt, the pt can be reached at [phone]///[id number]

## 2018-05-01 ENCOUNTER — OFFICE VISIT (OUTPATIENT)
Dept: CARDIOLOGY | Facility: CLINIC | Age: 83
End: 2018-05-01
Payer: MEDICARE

## 2018-05-01 VITALS
BODY MASS INDEX: 29.24 KG/M2 | WEIGHT: 171.31 LBS | DIASTOLIC BLOOD PRESSURE: 56 MMHG | HEIGHT: 64 IN | HEART RATE: 60 BPM | SYSTOLIC BLOOD PRESSURE: 140 MMHG

## 2018-05-01 DIAGNOSIS — I16.0 HYPERTENSIVE URGENCY: ICD-10-CM

## 2018-05-01 DIAGNOSIS — E78.5 HYPERLIPIDEMIA, UNSPECIFIED HYPERLIPIDEMIA TYPE: Chronic | ICD-10-CM

## 2018-05-01 DIAGNOSIS — I11.9 HYPERTENSIVE LEFT VENTRICULAR HYPERTROPHY, WITHOUT HEART FAILURE: Chronic | ICD-10-CM

## 2018-05-01 DIAGNOSIS — I10 ESSENTIAL HYPERTENSION: Primary | Chronic | ICD-10-CM

## 2018-05-01 DIAGNOSIS — I51.89 DIASTOLIC DYSFUNCTION WITHOUT HEART FAILURE: Chronic | ICD-10-CM

## 2018-05-01 PROCEDURE — 99499 UNLISTED E&M SERVICE: CPT | Mod: S$GLB,,, | Performed by: INTERNAL MEDICINE

## 2018-05-01 PROCEDURE — 99214 OFFICE O/P EST MOD 30 MIN: CPT | Mod: S$GLB,,, | Performed by: INTERNAL MEDICINE

## 2018-05-01 PROCEDURE — 99999 PR PBB SHADOW E&M-EST. PATIENT-LVL IV: CPT | Mod: PBBFAC,,, | Performed by: INTERNAL MEDICINE

## 2018-05-01 PROCEDURE — 3077F SYST BP >= 140 MM HG: CPT | Mod: CPTII,S$GLB,, | Performed by: INTERNAL MEDICINE

## 2018-05-01 PROCEDURE — 3078F DIAST BP <80 MM HG: CPT | Mod: CPTII,S$GLB,, | Performed by: INTERNAL MEDICINE

## 2018-05-01 RX ORDER — CLONIDINE 0.1 MG/24H
1 PATCH, EXTENDED RELEASE TRANSDERMAL
Qty: 4 PATCH | Refills: 11 | Status: SHIPPED | OUTPATIENT
Start: 2018-05-01 | End: 2018-05-01 | Stop reason: SDUPTHER

## 2018-05-01 RX ORDER — CLONIDINE 0.1 MG/24H
1 PATCH, EXTENDED RELEASE TRANSDERMAL
Qty: 4 PATCH | Refills: 11 | Status: SHIPPED | OUTPATIENT
Start: 2018-05-01 | End: 2018-05-24

## 2018-05-01 NOTE — PROGRESS NOTES
Subjective:   Patient ID:  Shirley Metz is a 85 y.o. female who presents for cardiac consult of Hypertension      HPI  The patient came in today for cardiac consult of Hypertension    Ms. Flower is an 85 year old female patient with a PMHx of HTN, hyperlipidemia, palpitations who presents today for follow-up.    Has significant side effects from many BP meds, could not tolerate Verapamil recently. BP has improved, but sometimes BP gets too low - occasionally 119/53.   She thinks her BP is too low under 130 systolic and wants to decrease some meds. Discussed we can half the clonidine patch and monitor. Will continue other meds for now.  She has a good log with BPs daily. Overall BP is well controlled now. Tries to eat low salt diet for the most part but is at Nursing home and can't always control the diet.   Other main issue is her arm pain due to shoulder pain will see pain management specialist Dr. Chin.     Patient feels no chest pain, no sob, no leg swelling, no PND, no palpitation, no dizziness, no syncope, no CNS symptoms.    Patient is compliant with medications.    Past Medical History:   Diagnosis Date    Anxiety 11/28/2017    Arthritis     Back pain     Colon polyps     reported per patient.     Fuchs' corneal dystrophy     Glaucoma     Hyperlipidemia     Hypertension     Macular degeneration dry    Obesity     Trouble in sleeping     Urinary tract infection        Past Surgical History:   Procedure Laterality Date    ADENOIDECTOMY  1938    BREAST CYST EXCISION Left 1997 approx    CARPAL TUNNEL RELEASE Right 2012 approx    CATARACT EXTRACTION Bilateral 1994    CHOLECYSTECTOMY  1975    CORNEAL TRANSPLANT Bilateral 08/2015 8/2015 - left; Right 4/2016    EYE SURGERY      Fuch's Corneal dystrophy - had 2nd operation with Dr. Quintanilla    EYE SURGERY      Cataract wIOL    left thumb Left 2011    removed cuboid for arthritis    SLT- OS (aka TRABECULOPLASTY) Left  05/11/2011    repeat 3/14/12    TONSILLECTOMY  1938       Social History   Substance Use Topics    Smoking status: Never Smoker    Smokeless tobacco: Never Used      Comment: history of passive smoking from her ex-    Alcohol use 1.2 oz/week     2 Standard drinks or equivalent per week      Comment: occasional        Family History   Problem Relation Age of Onset    Heart disease Mother     Hypertension Mother     Cancer Father 88     sarcoma( ?Kaposi's ) on leg    Deep vein thrombosis Neg Hx     Ovarian cancer Neg Hx     Breast cancer Neg Hx     Kidney disease Neg Hx     Strabismus Neg Hx     Retinal detachment Neg Hx     Macular degeneration Neg Hx     Glaucoma Neg Hx     Blindness Neg Hx     Amblyopia Neg Hx     Eczema Neg Hx     Lupus Neg Hx     Melanoma Neg Hx     Psoriasis Neg Hx     Diabetes Neg Hx     Stroke Neg Hx     Mental retardation Neg Hx     Mental illness Neg Hx     Hyperlipidemia Neg Hx     COPD Neg Hx     Asthma Neg Hx     Depression Neg Hx     Alcohol abuse Neg Hx     Drug abuse Neg Hx        Patient's Medications   New Prescriptions    CLONIDINE 0.1 MG/24 HR TD PTWK (CATAPRES) 0.1 MG/24 HR    Place 1 patch onto the skin every 7 days.   Previous Medications    ACETAMINOPHEN (TYLENOL) 500 MG TABLET    Take 500 mg by mouth once daily at 6am. Taking 1 to 3    ALPHA LIPOIC ACID ORAL    Take 200 mg by mouth.     ALPRAZOLAM (XANAX) 0.5 MG TABLET    TAKE 1/2 - 1 TABLET BY MOUTH NIGHTLY FOR SLEEP OR ANXIETY    B COMPLEX-VITAMIN C-FOLIC ACID 0.8 MG TAB    Take 1 tablet by mouth once daily.    CALCIUM CITRATE ORAL    Take by mouth.    CHOLECALCIFEROL, VITAMIN D3, (VITAMIN D3) 2,000 UNIT CAP    Take 1 capsule by mouth once daily.    COENZYME Q10 200 MG CAPSULE    Take 400 mg by mouth once daily.     FISH OIL-OMEGA-3 FATTY ACIDS 300-1,000 MG CAPSULE    Take 2 g by mouth once daily.    LIFITEGRAST (XIIDRA) 5 % DPET    Apply 1 drop to eye 2 (two) times daily.    LOSARTAN  "(COZAAR) 50 MG TABLET    Take 2 tablets (100 mg total) by mouth 2 (two) times daily.    LOTEPREDNOL (LOTEMAX) 0.5 % OPHTHALMIC SUSPENSION    1 drop once daily. One drop in the left eye and 1 drops in the right eye    METOPROLOL SUCCINATE (TOPROL-XL) 25 MG 24 HR TABLET    Take 2 tablets (50 mg total) by mouth 2 (two) times daily.    POLYETHYLENE GLYCOL 3350 (MIRALAX ORAL)    Take by mouth as needed.     PYRIDOXINE, VITAMIN B6, (B-6) 100 MG TAB    Take 100 mg by mouth 2 (two) times daily.     TRAVOPROST, BENZALKONIUM, (TRAVATAN) 0.004 % OPHTHALMIC SOLUTION    Place 1 drop into the left eye every evening.    TRIAMTERENE-HYDROCHLOROTHIAZIDE 37.5-25 MG (DYAZIDE) 37.5-25 MG PER CAPSULE    Take 1 capsule by mouth every morning.    TURMERIC ROOT EXTRACT ORAL    Take 665 mg by mouth.    Modified Medications    No medications on file   Discontinued Medications    CLONIDINE 0.2 MG/24 HR TD PTWK (CATAPRES) 0.2 MG/24 HR    Place 1 patch onto the skin every 7 days.       Review of Systems   Respiratory: Negative for shortness of breath.    Cardiovascular: Negative for chest pain, palpitations and leg swelling.   Musculoskeletal: Positive for joint pain.   Neurological: Positive for dizziness.       Wt Readings from Last 3 Encounters:   05/01/18 77.7 kg (171 lb 4.8 oz)   04/17/18 77.3 kg (170 lb 6.7 oz)   04/02/18 76.2 kg (167 lb 15.9 oz)     Temp Readings from Last 3 Encounters:   04/17/18 97.8 °F (36.6 °C)   03/16/18 98.6 °F (37 °C) (Oral)   03/16/18 98.7 °F (37.1 °C) (Oral)     BP Readings from Last 3 Encounters:   05/01/18 (!) 140/56   04/17/18 (!) 140/62   04/02/18 126/64     Pulse Readings from Last 3 Encounters:   05/01/18 60   04/17/18 (!) 58   04/02/18 (!) 56       BP (!) 140/56 (BP Method: Large (Manual))   Pulse 60   Ht 5' 4" (1.626 m)   Wt 77.7 kg (171 lb 4.8 oz)   BMI 29.40 kg/m²     Objective:   Physical Exam   Constitutional: She is oriented to person, place, and time. She appears well-developed and " well-nourished. No distress.   HENT:   Head: Normocephalic and atraumatic.   Nose: Nose normal.   Mouth/Throat: Oropharynx is clear and moist.   Eyes: Conjunctivae and EOM are normal. No scleral icterus.   Neck: Normal range of motion. Neck supple. No JVD present. No thyromegaly present.   Cardiovascular: Normal rate, regular rhythm, S1 normal and S2 normal.  Exam reveals no gallop, no S3, no S4 and no friction rub.    No murmur heard.  Pulmonary/Chest: Effort normal and breath sounds normal. No stridor. No respiratory distress. She has no wheezes. She has no rales. She exhibits no tenderness.   Abdominal: Soft. Bowel sounds are normal. She exhibits no distension and no mass. There is no tenderness. There is no rebound.   Genitourinary:   Genitourinary Comments: Deferred   Musculoskeletal: Normal range of motion. She exhibits no edema, tenderness or deformity.   Lymphadenopathy:     She has no cervical adenopathy.   Neurological: She is alert and oriented to person, place, and time. She exhibits normal muscle tone. Coordination normal.   Skin: Skin is warm and dry. No rash noted. She is not diaphoretic. No erythema. No pallor.   Psychiatric: She has a normal mood and affect. Her behavior is normal. Judgment and thought content normal.   Nursing note and vitals reviewed.      Lab Results   Component Value Date     03/16/2018    K 3.9 03/16/2018     03/16/2018    CO2 26 03/16/2018    BUN 24 (H) 03/16/2018    CREATININE 1.0 03/16/2018    GLU 95 03/16/2018    HGBA1C 5.7 (H) 03/16/2018    MG 1.9 03/16/2018    AST 18 03/15/2018    ALT 18 03/15/2018    ALBUMIN 3.8 03/15/2018    PROT 6.9 03/15/2018    BILITOT 0.7 03/15/2018    WBC 6.77 03/16/2018    HGB 11.3 (L) 03/16/2018    HCT 34.2 (L) 03/16/2018    MCV 95 03/16/2018     03/16/2018    INR 1.0 01/25/2018    TSH 1.871 01/04/2017    CHOL 180 03/16/2018    HDL 43 03/16/2018    LDLCALC 117.6 03/16/2018    TRIG 97 03/16/2018     (H) 03/15/2018      Assessment:      1. Essential hypertension    2. Hyperlipidemia, unspecified hyperlipidemia type    3. Hypertensive left ventricular hypertrophy, without heart failure    4. Diastolic dysfunction without heart failure    5. Hypertensive urgency        Plan:   1. HTN  - decreased clonidine patch to 0.1 mg weekly  - will continue other meds and log BP and call if BP is significantly higher  - cont low salt diet    2. HLD  - cont low manuel diet    3. Diastolc HF  - pt euvolemic  - occ ankle swelling at night but now normal    4. Palpitations  - cont BB    RTC in 4 weeks  Thank you for allowing me to participate in this patient's care. Please do not hesitate to contact me with any questions or concerns. Consult note has been forwarded to the referral physician.

## 2018-05-10 ENCOUNTER — PATIENT MESSAGE (OUTPATIENT)
Dept: CARDIOLOGY | Facility: CLINIC | Age: 83
End: 2018-05-10

## 2018-05-14 ENCOUNTER — OFFICE VISIT (OUTPATIENT)
Dept: PAIN MEDICINE | Facility: CLINIC | Age: 83
End: 2018-05-14
Payer: MEDICARE

## 2018-05-14 VITALS
DIASTOLIC BLOOD PRESSURE: 69 MMHG | WEIGHT: 171 LBS | HEART RATE: 73 BPM | HEIGHT: 64 IN | SYSTOLIC BLOOD PRESSURE: 155 MMHG | BODY MASS INDEX: 29.19 KG/M2

## 2018-05-14 DIAGNOSIS — G89.29 CHRONIC LEFT SHOULDER PAIN: ICD-10-CM

## 2018-05-14 DIAGNOSIS — M19.011 BILATERAL SHOULDER REGION ARTHRITIS: ICD-10-CM

## 2018-05-14 DIAGNOSIS — M19.012 BILATERAL SHOULDER REGION ARTHRITIS: ICD-10-CM

## 2018-05-14 DIAGNOSIS — G89.29 CHRONIC PAIN OF BOTH SHOULDERS: Primary | ICD-10-CM

## 2018-05-14 DIAGNOSIS — M25.512 CHRONIC LEFT SHOULDER PAIN: ICD-10-CM

## 2018-05-14 DIAGNOSIS — M25.512 CHRONIC PAIN OF BOTH SHOULDERS: Primary | ICD-10-CM

## 2018-05-14 DIAGNOSIS — M25.511 CHRONIC PAIN OF BOTH SHOULDERS: Primary | ICD-10-CM

## 2018-05-14 DIAGNOSIS — I51.7 LVH (LEFT VENTRICULAR HYPERTROPHY): Primary | ICD-10-CM

## 2018-05-14 PROCEDURE — 99204 OFFICE O/P NEW MOD 45 MIN: CPT | Mod: S$GLB,,, | Performed by: ANESTHESIOLOGY

## 2018-05-14 PROCEDURE — 99999 PR PBB SHADOW E&M-EST. PATIENT-LVL III: CPT | Mod: PBBFAC,,, | Performed by: ANESTHESIOLOGY

## 2018-05-14 PROCEDURE — 3078F DIAST BP <80 MM HG: CPT | Mod: CPTII,S$GLB,, | Performed by: ANESTHESIOLOGY

## 2018-05-14 PROCEDURE — 3077F SYST BP >= 140 MM HG: CPT | Mod: CPTII,S$GLB,, | Performed by: ANESTHESIOLOGY

## 2018-05-14 RX ORDER — METOPROLOL SUCCINATE 50 MG/1
50 TABLET, EXTENDED RELEASE ORAL DAILY
Qty: 30 TABLET | Refills: 6 | Status: SHIPPED | OUTPATIENT
Start: 2018-05-14 | End: 2018-06-11 | Stop reason: SDUPTHER

## 2018-05-14 NOTE — TELEPHONE ENCOUNTER
----- Message from Lilia Paez sent at 5/14/2018  9:46 AM CDT -----  Contact: [facility]  [facility] called and stated because there is a dose change of the Metoprolol the insurance is requiring an Authorization.    [facility] - [place], [place] - [address]   [address] [facility] [address]  Phone: [phone] Fax: [phone]    Thanks,  Lilia

## 2018-05-14 NOTE — PROGRESS NOTES
Chief Pain Complaint:  Shoulder Pain (left)        History of Present Illness:   Shirley Metz is a 85 y.o. female  who is presenting with a chief complaint of Shoulder Pain (left)  . The patient began experiencing this problem insidiously, and the pain has been gradually worsening over the past 2 year(s). The pain is described as throbbing, cramping, aching and heavy and is located in the bilateral shoudlers. Pain is intermittent and lasts hours. The  pain is nonradiating. The patient rates her pain a 8 out of ten and interferes with activities of daily living a 8 out of ten. Pain is exacerbated by abduction of the shoulder joint, and is improved by rest. Patient reports no prior trauma, no prior spinal surgery     - pertinent negatives: No fever, No chills, No weight loss, No bladder dysfunction, No bowel dysfunction, No saddle anesthesia  - pertinent positives: none    - medications, other therapies tried (physical therapy, injections):     >> Tylenol and zanaflex    >> Has previously undergone Physical Therapy    >> Has NOT previously undergone spinal injection/s      Imaging / Labs / Studies (reviewed on 5/14/2018):                                    Results for orders placed during the hospital encounter of 06/11/15   X-Ray Lumbar Spine AP And Lateral    Narrative Comparison: None     two views, 3 images    Findings:    Body habitus, generalized osteopenia and overlying bowel combine to limit the study.  Multilevel marginal spurring and facet arthropathy.  Multilevel loss of disc height and grade 1 anterolisthesis L4 on L5 noted.  No acute fracture.  Pedicles and SI   joints appear intact.  Postsurgical changes right upper quadrant.    Impression       Osteopenia and spondylosis without acute fracture noted.    Grade 1 anterolisthesis L4 on L5.    Follow-up and/or further evaluation as warranted.       Electronically signed by: MAGDY RIOS III, MD  Date:     06/11/15  Time:    13:46               Results for orders placed in visit on 07/05/12   X-Ray Cervical Spine AP And Lateral    Narrative DATE OF EXAM: Jul 5 2012      [facility]   0133  -  C-SPINE 2 OR 3 VIEWS:   \  [id number]     CLINICAL HISTORY:   \723.1 0 CERVICALGIA     PROCEDURE COMMENT:   \     ICD 9 CODE(S):   (\)     CPT 4 CODE(S)/MODIFIER(S):   (\)     Findings: There is moderate to marked multilevel degenerative change with   prominent bilateral facet arthropathy, vertebral end plate spurring,   and/or disk space narrowing from C3-4 through C7-T1.  Minimal degree of   grade 1 anterolisthesis is present at C5-6.  Odontoid and lateral masses   appear intact.  There is no visible fracture.        Impression: As above.  ______________________________________      Electronically signed by: GABRIEL MELGOZA MD  Date:     07/05/12  Time:    08:56            : JOLEEN  Transcribe Date/Time: Jul 5 2012  8:56A  Dictated by : GABRIEL MELGOZA MD  Report reviewed by:   Read On:   \  Images were reviewed, findings were verified and document was   electronically  SIGNED BY: GABRIEL MELGOZA MD On: Jul 5 2012  8:56A          Results for orders placed during the hospital encounter of 04/02/18   X-Ray Cervical Spine Complete 5 view    Narrative EXAMINATION:  XR CERVICAL SPINE COMPLETE 5 VIEW    CLINICAL HISTORY:  . Cervicalgia    TECHNIQUE:  AP, Lateral, bilateral oblique and open mouth views of the cervical spine were performed.    COMPARISON:  None    FINDINGS:  The bones are diffusely osteopenic.  There is degenerative vertebral endplate spurring, bilateral facet arthropathy, and disc space narrowing, most pronounced at C3-4, C5-6, and C6-7.  Variable degree of bony foraminal encroachment noted bilaterally at multiple levels.  Odontoid is intact.  No fracture or subluxation.      Impression As above.      Electronically signed by: ROHAN Melgoza MD  Date:    04/02/2018  Time:    08:42           Review of Systems:  CONSTITUTIONAL: patient  "denies any fever, chills, or weight loss  SKIN: patient denies any rash or itching  RESPIRATORY: patient denies having any shortness of breath  GASTROINTESTINAL: patient denies having any diarrhea, constipation, or bowel incontinence  GENITOURINARY: patient denies having any abnormal bladder function    MUSCULOSKELETAL:  - patient complains of the above noted pain/s (see chief pain complaint)    NEUROLOGICAL:   - pain as above  - strength in Upper extremities is intact, BILATERALLY  - sensation in Upper extremities is intact, BILATERALLY  - patient denies any loss of bowel or bladder control      PSYCHIATRIC: patient denies any change in mood    Other:  All other systems reviewed and are negative      Physical Exam:  BP (!) 155/69   Pulse 73   Ht 5' 4" (1.626 m)   Wt 77.6 kg (171 lb)   BMI 29.35 kg/m²  (reviewed on 5/14/2018)  General: Alert and oriented, in no apparent distress.  Gait: normal gait.  Skin: No rashes, No discoloration, No obvious lesions  HEENT: Normocephalic, atraumatic. Pupils equal and round.  Cardiovascular: Regular rate and rhythm , no significant peripheral edema present  Respiratory: Without audible wheezing, without use of accessory muscles of respiration.    Musculoskeletal:    Cervical Spine    - Pain on flexion of cervical spine Absent  - Spurling's Test:  Absent    - Pain on extension of cervical spine Absent  - TTP over the cervical facet joints Absent  - Cervical facet loading Absent    -TTP over bilateral Shoulder Joints  -ROM slight decresed    Neuro:    Strength:  UE R/L: D: 5/5; B: 5/5; T: 5/5; WF: 5/5; WE: 5/5; IO: 5/5;    Extremity Reflexes: Brisk and symmetric throughout.      Extremity Sensory: Sensation to pinprick and temperature symmetric. Proprioception intact.      Psych:  Mood and affect is appropriate      Assessment:    Shirley Metz is a 85 y.o. year old female who is presenting with    Encounter Diagnoses   Name Primary?    Chronic left shoulder pain  "    Bilateral shoulder region arthritis     Chronic pain of both shoulders Yes       Plan:    1. Interventional: Bilateral Shoulder Injection with local NO STEROID ONLY LIDOCAINE.     2. Pharmacologic: Tylenol PRN.    3. Rehabilitative: Encouraged PT.    4. Diagnostic: None for now.    5. Follow up: Follow-up for After Injection.      20 minutes were spent in this encounter with more than 50% of the time used for counseling and review of the plan.  Imaging / studies reviewed, detailed above.  I discussed in detail the risks, benefits, and alternatives to any and all potential treatment options.  All questions and concerns were fully addressed today in clinic. Medical decision making moderate.    Thank you for the opportunity to assist in the care of this patient.    Best wishes,    Signed:    Gregor Chin MD          Disclaimer:  This note may have been prepared using voice recognition software, it may have not been extensively proofed, as such there could be errors within the text such as sound alike errors.

## 2018-05-14 NOTE — TELEPHONE ENCOUNTER
New prescription for Metoprolol Succinate 50 mg tablet one tablet by mouth once daily sent to pharmacy

## 2018-05-14 NOTE — LETTER
May 14, 2018      Solomon Lujan MD  [id number] [place] [address] [address] [place] [id number]           [facility]  [address] [facility] [address]  [place] [address]  Phone: [phone]  Fax: [phone]          Patient: Shirley Metz   MR Number: [id number]   YOB: 1932   Date of Visit: 5/14/2018       Dear Dr. Solomon Lujan:    Thank you for referring Shirley Metz to me for evaluation. Attached you will find relevant portions of my assessment and plan of care.    If you have questions, please do not hesitate to call me. I look forward to following Shirley Metz along with you.    Sincerely,    Gregor Chin MD    Enclosure  CC:  No Recipients    If you would like to receive this communication electronically, please contact [email or web address] or [phone] to request more information on [facility] access.    For providers and/or their staff who would like to refer a patient to [facility], please contact us through our one-stop-shop provider referral line, [facility], at [phone].    If you feel you have received this communication in error or would no longer like to receive these types of communications, please e-mail [email or web address]

## 2018-05-15 ENCOUNTER — TELEPHONE (OUTPATIENT)
Dept: CARDIOLOGY | Facility: CLINIC | Age: 83
End: 2018-05-15

## 2018-05-15 DIAGNOSIS — R00.2 PALPITATIONS: ICD-10-CM

## 2018-05-15 DIAGNOSIS — I51.7 LVH (LEFT VENTRICULAR HYPERTROPHY): ICD-10-CM

## 2018-05-15 RX ORDER — METOPROLOL SUCCINATE 25 MG/1
100 TABLET, EXTENDED RELEASE ORAL 2 TIMES DAILY
Qty: 180 TABLET | Refills: 3 | Status: SHIPPED | OUTPATIENT
Start: 2018-05-15 | End: 2018-05-24

## 2018-05-15 NOTE — TELEPHONE ENCOUNTER
Called back to [facility]--states patient would like to get an order for Metoprolol XL 25 mg tablets one tablet by mouth four times a day because patient tapers medication according to blood pressure readings--advised [facility] I will mention to Dr. Ramirez and uncertain that patient's insurance will cover--please advise

## 2018-05-15 NOTE — TELEPHONE ENCOUNTER
----- Message from Moriah Siddiqui sent at 5/15/2018  9:34 AM CDT -----  Contact: [facility]  She's calling in regards to , prior authorization (metoprolol 25mg er tablets), Pls call back [phone] ask for Snehal Londono

## 2018-05-16 NOTE — TELEPHONE ENCOUNTER
Called to [employer] for prior authorization for metoprolol 25 mg tablet one tablet by mouth four times a day dispense 120 tablets for 30 day fill with six refills--diagnosis palpitations and htn--information given to Daiana--states will fax answer to [phone] within 24-72 hiours----

## 2018-05-21 ENCOUNTER — OFFICE VISIT (OUTPATIENT)
Dept: ORTHOPEDICS | Facility: CLINIC | Age: 83
End: 2018-05-21
Payer: MEDICARE

## 2018-05-21 ENCOUNTER — PROCEDURE VISIT (OUTPATIENT)
Dept: DERMATOLOGY | Facility: CLINIC | Age: 83
End: 2018-05-21
Payer: MEDICARE

## 2018-05-21 VITALS
SYSTOLIC BLOOD PRESSURE: 112 MMHG | WEIGHT: 171 LBS | DIASTOLIC BLOOD PRESSURE: 55 MMHG | BODY MASS INDEX: 29.19 KG/M2 | HEART RATE: 62 BPM | HEIGHT: 64 IN

## 2018-05-21 DIAGNOSIS — M19.011 PRIMARY OSTEOARTHRITIS OF BOTH SHOULDERS: Primary | ICD-10-CM

## 2018-05-21 DIAGNOSIS — C44.519 BASAL CELL CARCINOMA (BCC) OF SKIN OF TRUNK: Primary | ICD-10-CM

## 2018-05-21 DIAGNOSIS — M19.012 PRIMARY OSTEOARTHRITIS OF BOTH SHOULDERS: Primary | ICD-10-CM

## 2018-05-21 PROCEDURE — 99499 UNLISTED E&M SERVICE: CPT | Mod: S$GLB,,, | Performed by: DERMATOLOGY

## 2018-05-21 PROCEDURE — 99213 OFFICE O/P EST LOW 20 MIN: CPT | Mod: S$GLB,,, | Performed by: ORTHOPAEDIC SURGERY

## 2018-05-21 PROCEDURE — 99499 UNLISTED E&M SERVICE: CPT | Mod: S$GLB,,, | Performed by: ORTHOPAEDIC SURGERY

## 2018-05-21 PROCEDURE — 3078F DIAST BP <80 MM HG: CPT | Mod: CPTII,S$GLB,, | Performed by: ORTHOPAEDIC SURGERY

## 2018-05-21 PROCEDURE — 3074F SYST BP LT 130 MM HG: CPT | Mod: CPTII,S$GLB,, | Performed by: ORTHOPAEDIC SURGERY

## 2018-05-21 PROCEDURE — 99999 PR PBB SHADOW E&M-EST. PATIENT-LVL IV: CPT | Mod: PBBFAC,,, | Performed by: ORTHOPAEDIC SURGERY

## 2018-05-21 PROCEDURE — 11602 EXC TR-EXT MAL+MARG 1.1-2 CM: CPT | Mod: S$GLB,,, | Performed by: DERMATOLOGY

## 2018-05-21 RX ORDER — LOTEPREDNOL ETABONATE 5 MG/G
GEL OPHTHALMIC
COMMUNITY
Start: 2018-05-04 | End: 2020-10-28 | Stop reason: SDUPTHER

## 2018-05-21 NOTE — PATIENT INSTRUCTIONS
Wound Care    A pressure dressing and vaseline ointment has been placed over the site.  This should remain in place for 48 hours.  It is recommended that you keep the area dry for the first 24 hours.  After 24 hours, you may remove the bandage and wash the area with warm soap and water, apply Vaseline, and keep covered with a bandage.  This should be repeated every 24 hours. Many patients prefer to use Neosporin or Bacitracin ointment.  This is acceptable; however, know that you can develop an allergy to this medication even if you have used it safely for years.  It is important to keep the area moist.  Letting it dry out and get air slows healing time, and will worsen the scar.  Keep the areas covered with a bandage for two weeks.       If you notice increasing redness, tenderness, pain, or yellow drainage at the site, please notify your doctor.  These are signs of an infection.    If your site is bleeding, apply firm pressure for 15 minutes straight.  Repeat for another 15 minutes, if it is still bleeding.   If the surgical site continues to bleed, then please contact your doctor.      [facility]   DERMATOLOGY  [address]   [place] [address]   Dept: [phone]   Dept Fax: [phone]

## 2018-05-21 NOTE — PROGRESS NOTES
"Subjective:     Patient ID: Shirley Metz is a 85 y.o. female.    Chief Complaint: Pain of the Right Shoulder and Pain of the Left Shoulder      New: Since last visit she has bilateral injections to shoulders scheduled with Dr. Chin, is interested in possible arthroplasty     Prior: Pt presents to clinic for a new pt consult for bilateral shoulder pain. She c/o bilateral upper arm and shoulder pain x 6 mo. Denies any trauma w/ pain onset. Pain is sharp in bilateral shoulders, L > R, and radiates into pt's arms to the point of her elbows. She was seen in rheumatology in January, started PT x 2 mo which pt reports did help with pain/ROM, states massages helped with "lumps" in her upper arms. Since completing therapy, pain has returned. Pt declined steroid injections in Jan with rheumatology. She is unable to take NSAID's due to CKD/HTN-uncontrolled. She takes otc Tylenol with minimal to moderate pain relief. She denies neck pain, denies sharp shooting pains down either UE , denies N/T. Pt is R handed.       Shoulder Pain    The pain is present in the left shoulder and right shoulder. This is a new problem. The current episode started more than 1 month ago. The problem occurs intermittently. The problem has been unchanged. The quality of the pain is described as sharp, aching and throbbing. The pain is at a severity of 6/10. Associated symptoms include an inability to bear weight, a limited range of motion and stiffness. Pertinent negatives include no fever, itching or numbness. The symptoms are aggravated by activity, bearing weight, lying down, lifting and contact. She has tried OTC pain meds, OTC ointments, heat and brace/corset for the symptoms. The treatment provided mild relief. Physical therapy was ineffective.      Past Medical History:   Diagnosis Date    Anxiety 11/28/2017    Arthritis     Back pain     Colon polyps     reported per patient.     Fuchs' corneal dystrophy     Glaucoma     " Hyperlipidemia     Hypertension     Macular degeneration dry    Obesity     Trouble in sleeping     Urinary tract infection      Past Surgical History:   Procedure Laterality Date    ADENOIDECTOMY  1938    BREAST CYST EXCISION Left 1997 approx    CARPAL TUNNEL RELEASE Right 2012 approx    CATARACT EXTRACTION Bilateral 1994    CHOLECYSTECTOMY  1975    CORNEAL TRANSPLANT Bilateral 08/2015 8/2015 - left; Right 4/2016    EYE SURGERY      Fuch's Corneal dystrophy - had 2nd operation with Dr. Quintanilla    EYE SURGERY      Cataract wIOL    left thumb Left 2011    removed cuboid for arthritis    SLT- OS (aka TRABECULOPLASTY) Left 05/11/2011    repeat 3/14/12    TONSILLECTOMY  1938     Family History   Problem Relation Age of Onset    Heart disease Mother     Hypertension Mother     Cancer Father 88        sarcoma( ?Kaposi's ) on leg    Deep vein thrombosis Neg Hx     Ovarian cancer Neg Hx     Breast cancer Neg Hx     Kidney disease Neg Hx     Strabismus Neg Hx     Retinal detachment Neg Hx     Macular degeneration Neg Hx     Glaucoma Neg Hx     Blindness Neg Hx     Amblyopia Neg Hx     Eczema Neg Hx     Lupus Neg Hx     Melanoma Neg Hx     Psoriasis Neg Hx     Diabetes Neg Hx     Stroke Neg Hx     Mental retardation Neg Hx     Mental illness Neg Hx     Hyperlipidemia Neg Hx     COPD Neg Hx     Asthma Neg Hx     Depression Neg Hx     Alcohol abuse Neg Hx     Drug abuse Neg Hx      Social History     Social History    Marital status:      Spouse name: N/A    Number of children: N/A    Years of education: N/A     Occupational History    Not on file.     Social History Main Topics    Smoking status: Never Smoker    Smokeless tobacco: Never Used      Comment: history of passive smoking from her ex-    Alcohol use 1.2 oz/week     2 Standard drinks or equivalent per week      Comment: occasional     Drug use: No    Sexual activity: Not Currently     Partners: Male      Birth control/ protection: Post-menopausal      Comment: discussed protection for STD's     Other Topics Concern    Are You Pregnant Or Think You May Be? No    Breast-Feeding No     Social History Narrative     x 2,  x 1. Retired artist - previously doing jewelry/wall pieces; ; lives in [facility]; has 3 sons - 52( lives in [place], 54, and 56;exercises minimally - limited due to back issues. Still drives. Does have a Living Will.         Medication List with Changes/Refills   Current Medications    ACETAMINOPHEN (TYLENOL) 500 MG TABLET    Take 500 mg by mouth once daily at 6am. Taking 1 to 3    ALPHA LIPOIC ACID ORAL    Take 200 mg by mouth.     ALPRAZOLAM (XANAX) 0.5 MG TABLET    TAKE 1/2 - 1 TABLET BY MOUTH NIGHTLY FOR SLEEP OR ANXIETY    B COMPLEX-VITAMIN C-FOLIC ACID 0.8 MG TAB    Take 1 tablet by mouth once daily.    CALCIUM CITRATE ORAL    Take by mouth.    CHOLECALCIFEROL, VITAMIN D3, (VITAMIN D3) 2,000 UNIT CAP    Take 1 capsule by mouth once daily.    CLONIDINE 0.1 MG/24 HR TD PTWK (CATAPRES) 0.1 MG/24 HR    Place 1 patch onto the skin every 7 days.    COENZYME Q10 200 MG CAPSULE    Take 400 mg by mouth once daily.     FISH OIL-OMEGA-3 FATTY ACIDS 300-1,000 MG CAPSULE    Take 2 g by mouth once daily.    LIFITEGRAST (XIIDRA) 5 % DPET    Apply 1 drop to eye 2 (two) times daily.    LOSARTAN (COZAAR) 50 MG TABLET    Take 2 tablets (100 mg total) by mouth 2 (two) times daily.    LOTEMAX 0.5 % DRPG        LOTEPREDNOL (LOTEMAX) 0.5 % OPHTHALMIC SUSPENSION    1 drop once daily. One drop in the left eye and 1 drops in the right eye    METOPROLOL SUCCINATE (TOPROL-XL) 25 MG 24 HR TABLET    Take 4 tablets (100 mg total) by mouth 2 (two) times daily.    METOPROLOL SUCCINATE (TOPROL-XL) 50 MG 24 HR TABLET    Take 1 tablet (50 mg total) by mouth once daily.    POLYETHYLENE GLYCOL 3350 (MIRALAX ORAL)    Take by mouth as needed.     PYRIDOXINE, VITAMIN B6, (B-6) 100 MG TAB     Take 100 mg by mouth 2 (two) times daily.     TRAVOPROST, BENZALKONIUM, (TRAVATAN) 0.004 % OPHTHALMIC SOLUTION    Place 1 drop into the left eye every evening.    TRIAMTERENE-HYDROCHLOROTHIAZIDE 37.5-25 MG (DYAZIDE) 37.5-25 MG PER CAPSULE    Take 1 capsule by mouth every morning.    TURMERIC ROOT EXTRACT ORAL    Take 665 mg by mouth.      Review of patient's allergies indicates:   Allergen Reactions    Claritin [loratadine] Other (See Comments)     Double vision/spots    Hydrocodone-acetaminophen      wheezing    Naproxen      wheezing    Vibramycin [doxycycline calcium] Other (See Comments)     Weakness nausea   sob    Amlodipine      Myalgias      Coreg [carvedilol] Swelling     lips    Fenofibrate      Causes muscle weakness    Hydralazine analogues      Muscle tremors/aches      Isosorbide     Lasix [furosemide]      myalgias    Moxifloxacin Other (See Comments)     Hives   muscle soreness    Timolol     Allegra [fexofenadine] Other (See Comments)     Double vision/spots     Codeine Diarrhea and Other (See Comments)     Loss of balance     Erythromycin Other (See Comments)     Complete weakness/could not walk    Hydrocortisone (bulk) Other (See Comments)     Only suppository/ caused muscle weakness    Macrolide antibiotics Other (See Comments)     Complete weakness/could not walk    Patanol [olopatadine] Other (See Comments)     Muscle weakness    Prednisone Anxiety    Statins-hmg-coa reductase inhibitors Other (See Comments)     Muscle weakness    Xalatan [latanoprost] Other (See Comments)     Muscle weakness     Review of Systems   Constitution: Negative for fever.   HENT: Negative for sore throat.    Eyes: Negative for blurred vision.   Cardiovascular: Negative for dyspnea on exertion.   Respiratory: Negative for shortness of breath.    Hematologic/Lymphatic: Does not bruise/bleed easily.   Skin: Negative for itching.   Musculoskeletal: Positive for joint pain, myalgias and stiffness.  Negative for back pain, falls and neck pain.   Gastrointestinal: Negative for vomiting.   Genitourinary: Negative for dysuria.   Neurological: Negative for dizziness and numbness.   Psychiatric/Behavioral: The patient does not have insomnia.        Objective:   Body mass index is 29.35 kg/m².  Vitals:    05/21/18 1458   BP: (!) 112/55   Pulse: 62           General    Nursing note and vitals reviewed.  Constitutional: She is oriented to person, place, and time. She appears well-developed and well-nourished.   HENT:   Head: Atraumatic.   Eyes: EOM are normal.   Neck: Neck supple.   Cardiovascular: Normal rate.    Pulmonary/Chest: Effort normal.   Neurological: She is alert and oriented to person, place, and time.   Psychiatric: She has a normal mood and affect. Her behavior is normal.         Back (L-Spine & T-Spine) / Neck (C-Spine) Exam     Comments:  Slow and some stiffness with neck ROM but negative sprurling's  Right Shoulder Exam     Inspection/Observation   Swelling: absent  Deformity: absent    Tenderness   The patient is experiencing no tenderness.        Range of Motion   Active Abduction: 50   Forward Flexion: 160   External Rotation 0 degrees: 30 External Rotation 90 degrees: 60   Internal Rotation 0 degrees: L1     Tests & Signs   Impingement: negative  Active Compression Test (Sussex's Sign): negative  Speed's Test: negative  Bear Hug: negative    Other   Sensation: normal    Left Shoulder Exam     Inspection/Observation   Swelling: absent  Deformity: absent    Tenderness   The patient is experiencing no tenderness.         Crepitus   The patient has crepitus of the AC joint    Range of Motion   Active Abduction: 40   Forward Flexion: 130   External Rotation 0 degrees: 30 External Rotation 90 degrees: 50   Internal Rotation 0 degrees: L5     Tests & Signs   Hawkin's test: positive  Impingement: negative  Active Compression test (Sussex's Sign): negative  Speed's Test: negative  Bear Hug:  negative    Other   Sensation: normal       Muscle Strength   Right Upper Extremity   Shoulder Abduction: 5/5   Supraspinatus: 5/5/5   Subscapularis: 5/5/5   Biceps: 5/5/5   Left Upper Extremity  Shoulder Abduction: 5/5   Supraspinatus: 4/5/5   Subscapularis: 4/5/5   Biceps: 5/5/5     Vascular Exam     Right Pulses      Radial:                    2+      Left Pulses      Radial:                    2+      Capillary Refill  Right Hand: normal capillary refill  Left Hand: normal capillary refill      XRAY BILAT SHOULDERS 03/28/2018: severe glenohumeral degenerative changes present in bilateral shoulders, more significant in L shoulder. Moderate AC joint degenerative changes present in bilateral shoulders. No fracture. No deformity.     XRAY BILAT HUMERUS 03/28/2018: No fracture present. No deformity.       Cervical spine radiographs: reviewed from today's films, she has multi level severe disc space narrowing worst for  C3-4, C5-6, and C6-7 levels, endplate spurring and facet joint degenerative change.    Assessment:     Encounter Diagnosis   Name Primary?    Primary osteoarthritis of both shoulders Yes        Plan:     -keep appointment with Dr. Chin for injections to shoulders  -exercises for ROM reviewed and importance reviewed  -RTC in 3 weeks, to likely discuss L TSA vs L RTSA  Likely recommend CT and MRI presurgical planning

## 2018-05-21 NOTE — PROGRESS NOTES
Here for electrodesiccation and curettage of superficial basal cell carcinoma on the left mid-back. Biopsy was done on 3/29/18:    Electrodessication and Curettage Procedure note:    Written and informed consent obtained. Lesional tissue marked and prepped with alcohol. Lesion anesthetized with 3 cc 1% lidocaine with epinephrine. Curettage and desiccation x 3 cycles to base. Aluminum chloride for hemostasis. Lesion size after primary curettage: 1.6 cm    Area bandaged and wound care explained. AVS given.     Follow-up in about 2 months (around 7/21/2018).

## 2018-05-24 ENCOUNTER — OFFICE VISIT (OUTPATIENT)
Dept: CARDIOLOGY | Facility: CLINIC | Age: 83
End: 2018-05-24
Payer: MEDICARE

## 2018-05-24 VITALS
HEIGHT: 64 IN | BODY MASS INDEX: 29.1 KG/M2 | WEIGHT: 170.44 LBS | HEART RATE: 81 BPM | DIASTOLIC BLOOD PRESSURE: 56 MMHG | SYSTOLIC BLOOD PRESSURE: 140 MMHG

## 2018-05-24 DIAGNOSIS — I70.0 CALCIFICATION OF AORTA: Chronic | ICD-10-CM

## 2018-05-24 DIAGNOSIS — I49.3 PVC'S (PREMATURE VENTRICULAR CONTRACTIONS): ICD-10-CM

## 2018-05-24 DIAGNOSIS — E78.5 HYPERLIPIDEMIA, UNSPECIFIED HYPERLIPIDEMIA TYPE: Chronic | ICD-10-CM

## 2018-05-24 DIAGNOSIS — I51.89 DIASTOLIC DYSFUNCTION WITHOUT HEART FAILURE: Chronic | ICD-10-CM

## 2018-05-24 DIAGNOSIS — I10 ESSENTIAL HYPERTENSION: Primary | Chronic | ICD-10-CM

## 2018-05-24 DIAGNOSIS — R00.2 PALPITATIONS: ICD-10-CM

## 2018-05-24 DIAGNOSIS — I11.9 HYPERTENSIVE LEFT VENTRICULAR HYPERTROPHY, WITHOUT HEART FAILURE: Chronic | ICD-10-CM

## 2018-05-24 PROCEDURE — 99499 UNLISTED E&M SERVICE: CPT | Mod: S$GLB,,, | Performed by: INTERNAL MEDICINE

## 2018-05-24 PROCEDURE — 99215 OFFICE O/P EST HI 40 MIN: CPT | Mod: S$GLB,,, | Performed by: INTERNAL MEDICINE

## 2018-05-24 PROCEDURE — 99999 PR PBB SHADOW E&M-EST. PATIENT-LVL III: CPT | Mod: PBBFAC,,, | Performed by: INTERNAL MEDICINE

## 2018-05-24 PROCEDURE — 3077F SYST BP >= 140 MM HG: CPT | Mod: CPTII,S$GLB,, | Performed by: INTERNAL MEDICINE

## 2018-05-24 PROCEDURE — 3078F DIAST BP <80 MM HG: CPT | Mod: CPTII,S$GLB,, | Performed by: INTERNAL MEDICINE

## 2018-05-24 RX ORDER — CLONIDINE 0.2 MG/24H
PATCH, EXTENDED RELEASE TRANSDERMAL
COMMUNITY
Start: 2018-05-21 | End: 2018-06-11

## 2018-05-24 RX ORDER — TRIAMTERENE AND HYDROCHLOROTHIAZIDE 37.5; 25 MG/1; MG/1
1 CAPSULE ORAL DAILY
COMMUNITY
Start: 2018-05-21 | End: 2018-10-18 | Stop reason: SDUPTHER

## 2018-05-24 NOTE — PROGRESS NOTES
Subjective:   Patient ID:  Shirley Metz is a 85 y.o. female who presents for cardiac consult of Follow-up      HPI  The patient came in today for cardiac consult of Follow-up    Ms. Flower is an 85 year old female patient with a PMHx of HTN, hyperlipidemia, palpitations who presents today for follow-up.    5/1/18  She has significant side effects from many BP meds, could not tolerate Verapamil recently. BP has improved, but sometimes BP gets too low - occasionally 119/53.   She thinks her BP is too low under 130 systolic and wants to decrease some meds. Discussed we can half the clonidine patch and monitor. Will continue other meds for now.  She has a good log with BPs daily. Overall BP is well controlled now. Tries to eat low salt diet for the most part but is at Nursing home and can't always control the diet.   Other main issue is her arm pain due to shoulder pain will see pain management specialist Dr. Chin.     5/24/18  Has a lot of pain in both arms, will be seeing Dr. Chin and then may need surgery by Dr. Lujan. Reviewed BP log - mostly 130s-140s, once 96/58. Occasional palpitations - usually with waking up or sleeping.     Patient feels no chest pain, no sob, no leg swelling, no PND,  no dizziness, no syncope, no CNS symptoms.    Patient is compliant with medications.    2D ECHO  CONCLUSIONS     1 - Concentric hypertrophy.     2 - No wall motion abnormalities.     3 - Normal left ventricular systolic function (EF 55-60%).     4 - Normal left ventricular diastolic function.     5 - Normal right ventricular systolic function .     6 - Mild tricuspid regurgitation.       This document has been electronically    SIGNED BY: Samy Castillo MD On: 03/16/2018 12:40    Past Medical History:   Diagnosis Date    Anxiety 11/28/2017    Arthritis     Back pain     Colon polyps     reported per patient.     Fuchs' corneal dystrophy     Glaucoma     Hyperlipidemia     Hypertension     Macular  degeneration dry    Obesity     Trouble in sleeping     Urinary tract infection        Past Surgical History:   Procedure Laterality Date    ADENOIDECTOMY  1938    BREAST CYST EXCISION Left 1997 approx    CARPAL TUNNEL RELEASE Right 2012 approx    CATARACT EXTRACTION Bilateral 1994    CHOLECYSTECTOMY  1975    CORNEAL TRANSPLANT Bilateral 08/2015 8/2015 - left; Right 4/2016    EYE SURGERY      Fuch's Corneal dystrophy - had 2nd operation with Dr. Quintanilla    EYE SURGERY      Cataract wIOL    left thumb Left 2011    removed cuboid for arthritis    SLT- OS (aka TRABECULOPLASTY) Left 05/11/2011    repeat 3/14/12    TONSILLECTOMY  1938       Social History   Substance Use Topics    Smoking status: Never Smoker    Smokeless tobacco: Never Used      Comment: history of passive smoking from her ex-    Alcohol use 1.2 oz/week     2 Standard drinks or equivalent per week      Comment: occasional        Family History   Problem Relation Age of Onset    Heart disease Mother     Hypertension Mother     Cancer Father 88        sarcoma( ?Kaposi's ) on leg    Deep vein thrombosis Neg Hx     Ovarian cancer Neg Hx     Breast cancer Neg Hx     Kidney disease Neg Hx     Strabismus Neg Hx     Retinal detachment Neg Hx     Macular degeneration Neg Hx     Glaucoma Neg Hx     Blindness Neg Hx     Amblyopia Neg Hx     Eczema Neg Hx     Lupus Neg Hx     Melanoma Neg Hx     Psoriasis Neg Hx     Diabetes Neg Hx     Stroke Neg Hx     Mental retardation Neg Hx     Mental illness Neg Hx     Hyperlipidemia Neg Hx     COPD Neg Hx     Asthma Neg Hx     Depression Neg Hx     Alcohol abuse Neg Hx     Drug abuse Neg Hx        Patient's Medications   New Prescriptions    No medications on file   Previous Medications    ACETAMINOPHEN (TYLENOL) 500 MG TABLET    Take 500 mg by mouth once daily at 6am. Taking 1 to 3    ALPHA LIPOIC ACID ORAL    Take 200 mg by mouth.     ALPRAZOLAM (XANAX) 0.5 MG TABLET     TAKE 1/2 - 1 TABLET BY MOUTH NIGHTLY FOR SLEEP OR ANXIETY    B COMPLEX-VITAMIN C-FOLIC ACID 0.8 MG TAB    Take 1 tablet by mouth once daily.    CALCIUM CITRATE ORAL    Take by mouth.    CHOLECALCIFEROL, VITAMIN D3, (VITAMIN D3) 2,000 UNIT CAP    Take 1 capsule by mouth once daily.    CLONIDINE 0.2 MG/24 HR TD PTWK (CATAPRES) 0.2 MG/24 HR        COENZYME Q10 200 MG CAPSULE    Take 400 mg by mouth once daily.     FISH OIL-OMEGA-3 FATTY ACIDS 300-1,000 MG CAPSULE    Take 2 g by mouth once daily.    LIFITEGRAST (XIIDRA) 5 % DPET    Apply 1 drop to eye 2 (two) times daily.    LOSARTAN (COZAAR) 50 MG TABLET    Take 2 tablets (100 mg total) by mouth 2 (two) times daily.    LOTEMAX 0.5 % DRPG        METOPROLOL SUCCINATE (TOPROL-XL) 50 MG 24 HR TABLET    Take 1 tablet (50 mg total) by mouth once daily.    POLYETHYLENE GLYCOL 3350 (MIRALAX ORAL)    Take by mouth as needed.     PYRIDOXINE, VITAMIN B6, (B-6) 100 MG TAB    Take 100 mg by mouth 2 (two) times daily.     TRAVOPROST, BENZALKONIUM, (TRAVATAN) 0.004 % OPHTHALMIC SOLUTION    Place 1 drop into the left eye every evening.    TRIAMTERENE-HYDROCHLOROTHIAZIDE 37.5-25 MG (DYAZIDE) 37.5-25 MG PER CAPSULE    Take 1 capsule by mouth every morning.    TRIAMTERENE-HYDROCHLOROTHIAZIDE 37.5-25 MG (DYAZIDE) 37.5-25 MG PER CAPSULE        TURMERIC ROOT EXTRACT ORAL    Take 665 mg by mouth.    Modified Medications    No medications on file   Discontinued Medications    CLONIDINE 0.1 MG/24 HR TD PTWK (CATAPRES) 0.1 MG/24 HR    Place 1 patch onto the skin every 7 days.    LOTEPREDNOL (LOTEMAX) 0.5 % OPHTHALMIC SUSPENSION    1 drop once daily. One drop in the left eye and 1 drops in the right eye    METOPROLOL SUCCINATE (TOPROL-XL) 25 MG 24 HR TABLET    Take 4 tablets (100 mg total) by mouth 2 (two) times daily.       Review of Systems   Constitutional: Negative.    HENT: Negative.    Eyes: Negative.    Respiratory: Negative.  Negative for shortness of breath.    Cardiovascular: Positive  "for palpitations. Negative for chest pain and leg swelling.   Gastrointestinal: Negative.    Genitourinary: Negative.    Musculoskeletal: Positive for joint pain.   Skin: Negative.    Neurological: Positive for dizziness.   Endo/Heme/Allergies: Negative.    Psychiatric/Behavioral: Negative.    All 12 systems otherwise negative.      Wt Readings from Last 3 Encounters:   05/24/18 77.3 kg (170 lb 6.7 oz)   05/21/18 77.6 kg (171 lb)   05/14/18 77.6 kg (171 lb)     Temp Readings from Last 3 Encounters:   04/17/18 97.8 °F (36.6 °C)   03/16/18 98.6 °F (37 °C) (Oral)   03/16/18 98.7 °F (37.1 °C) (Oral)     BP Readings from Last 3 Encounters:   05/24/18 (!) 140/56   05/21/18 (!) 112/55   05/14/18 (!) 155/69     Pulse Readings from Last 3 Encounters:   05/24/18 81   05/21/18 62   05/14/18 73       BP (!) 140/56   Pulse 81   Ht 5' 4" (1.626 m)   Wt 77.3 kg (170 lb 6.7 oz)   BMI 29.25 kg/m²     Objective:   Physical Exam   Constitutional: She is oriented to person, place, and time. She appears well-developed and well-nourished. No distress.   HENT:   Head: Normocephalic and atraumatic.   Nose: Nose normal.   Mouth/Throat: Oropharynx is clear and moist.   Eyes: Conjunctivae and EOM are normal. No scleral icterus.   Neck: Normal range of motion. Neck supple. No JVD present. No thyromegaly present.   Cardiovascular: Normal rate, regular rhythm, S1 normal and S2 normal.  Exam reveals no gallop, no S3, no S4 and no friction rub.    No murmur heard.  Pulmonary/Chest: Effort normal and breath sounds normal. No stridor. No respiratory distress. She has no wheezes. She has no rales. She exhibits no tenderness.   Abdominal: Soft. Bowel sounds are normal. She exhibits no distension and no mass. There is no tenderness. There is no rebound.   Genitourinary:   Genitourinary Comments: Deferred   Musculoskeletal: Normal range of motion. She exhibits no edema, tenderness or deformity.   Lymphadenopathy:     She has no cervical adenopathy. "   Neurological: She is alert and oriented to person, place, and time. She exhibits normal muscle tone. Coordination normal.   Skin: Skin is warm and dry. No rash noted. She is not diaphoretic. No erythema. No pallor.   Psychiatric: She has a normal mood and affect. Her behavior is normal. Judgment and thought content normal.   Nursing note and vitals reviewed.      Lab Results   Component Value Date     03/16/2018    K 3.9 03/16/2018     03/16/2018    CO2 26 03/16/2018    BUN 24 (H) 03/16/2018    CREATININE 1.0 03/16/2018    GLU 95 03/16/2018    HGBA1C 5.7 (H) 03/16/2018    MG 1.9 03/16/2018    AST 18 03/15/2018    ALT 18 03/15/2018    ALBUMIN 3.8 03/15/2018    PROT 6.9 03/15/2018    BILITOT 0.7 03/15/2018    WBC 6.77 03/16/2018    HGB 11.3 (L) 03/16/2018    HCT 34.2 (L) 03/16/2018    MCV 95 03/16/2018     03/16/2018    INR 1.0 01/25/2018    TSH 1.871 01/04/2017    CHOL 180 03/16/2018    HDL 43 03/16/2018    LDLCALC 117.6 03/16/2018    TRIG 97 03/16/2018     (H) 03/15/2018     Assessment:      1. Essential hypertension    2. Hyperlipidemia, unspecified hyperlipidemia type    3. Hypertensive left ventricular hypertrophy, without heart failure    4. Calcification of aorta    5. Diastolic dysfunction without heart failure    6. Palpitations    7. PVC's (premature ventricular contractions)        Plan:   1. HTN  - cont meds  - cont low salt diet    2. HLD  - cont low manuel diet    3. Diastolc HF  - pt euvolemic  - occ ankle swelling at night but now normal    4. Palpitations  - cont BB    5. Pre-OP CV evaluation prior to shoulder surgery  Low periop risk of CV events for moderate risk procedure.  Ok to proceed to the scheduled surgery without further cardiac study.  Continue Metoprolol periop.      Thank you for allowing me to participate in this patient's care. Please do not hesitate to contact me with any questions or concerns. Consult note has been forwarded to the referral physician.

## 2018-05-30 ENCOUNTER — HOSPITAL ENCOUNTER (OUTPATIENT)
Dept: RADIOLOGY | Facility: HOSPITAL | Age: 83
Discharge: HOME | End: 2018-05-30
Attending: OBSTETRICS & GYNECOLOGY
Payer: MEDICARE

## 2018-05-30 ENCOUNTER — OFFICE VISIT (OUTPATIENT)
Dept: OBSTETRICS AND GYNECOLOGY | Facility: CLINIC | Age: 83
End: 2018-05-30
Payer: MEDICARE

## 2018-05-30 VITALS
SYSTOLIC BLOOD PRESSURE: 124 MMHG | BODY MASS INDEX: 29.59 KG/M2 | DIASTOLIC BLOOD PRESSURE: 72 MMHG | HEIGHT: 64 IN | WEIGHT: 173.31 LBS

## 2018-05-30 DIAGNOSIS — Z12.39 BREAST CANCER SCREENING: ICD-10-CM

## 2018-05-30 DIAGNOSIS — Z01.419 WELL WOMAN EXAM WITH ROUTINE GYNECOLOGICAL EXAM: Primary | ICD-10-CM

## 2018-05-30 PROCEDURE — 3078F DIAST BP <80 MM HG: CPT | Mod: CPTII,S$GLB,, | Performed by: OBSTETRICS & GYNECOLOGY

## 2018-05-30 PROCEDURE — 3074F SYST BP LT 130 MM HG: CPT | Mod: CPTII,S$GLB,, | Performed by: OBSTETRICS & GYNECOLOGY

## 2018-05-30 PROCEDURE — 99397 PER PM REEVAL EST PAT 65+ YR: CPT | Mod: S$GLB,,, | Performed by: OBSTETRICS & GYNECOLOGY

## 2018-05-30 PROCEDURE — 99999 PR PBB SHADOW E&M-EST. PATIENT-LVL III: CPT | Mod: PBBFAC,,, | Performed by: OBSTETRICS & GYNECOLOGY

## 2018-05-30 PROCEDURE — 77067 SCR MAMMO BI INCL CAD: CPT | Mod: TC

## 2018-05-30 PROCEDURE — 77067 SCR MAMMO BI INCL CAD: CPT | Mod: 26,,, | Performed by: RADIOLOGY

## 2018-05-30 PROCEDURE — 77063 BREAST TOMOSYNTHESIS BI: CPT | Mod: 26,,, | Performed by: RADIOLOGY

## 2018-05-30 NOTE — PROGRESS NOTES
CHIEF COMPLAINT:   Gynecologic Exam  Chief Complaint   Patient presents with    Annual Exam       HISTORY OF PRESENT ILLNESS  Patient presents for annual exam. The patient has no complaints today.     No LMP recorded. Patient is postmenopausal.      GYN screening history: last Pap: was normal. Never had any abnormal Pap smears in past.     Reports no bowel movement irregularities from baseline, bloating, or weight loss  HRT:   None        Health Maintenance   Topic Date Due    Zoster Vaccine  12/03/1992    Pneumococcal (65+) (1 of 2 - PCV13) 12/03/1997    Mammogram  08/30/2017    Influenza Vaccine  08/01/2018    Lipid Panel  03/16/2019    DEXA SCAN  08/07/2019    TETANUS VACCINE  11/09/2021    Hepatitis C Screening  Completed       HISTORY  Patient Active Problem List   Diagnosis    HTN (hypertension)    Hyperlipemia    Primary osteoarthritis involving multiple joints    Macular degeneration - Both Eyes    Fuch's endothelial dystrophy - Both Eyes    Lens replaced by other means    COAG (chronic open-angle glaucoma) - Both Eyes    Blepharitis, unspecified - Both Eyes    Palpitations    Abnormal ECG    PVC's (premature ventricular contractions)    Myopathy    LVH (left ventricular hypertrophy) due to hypertensive disease    Shoulder pain    Osteopenia    Sciatica    Myalgia    Calcification of aorta    Neuropathy    Chronic kidney disease, stage 3    Elevated blood pressure reading    Medication side effects    Elevated troponin I level    Hypertensive urgency    History of cornea transplant    Pseudophakia    Insomnia    Anxiety    Diastolic dysfunction without heart failure    Pre-diabetes    Obesity (BMI 30.0-34.9)    Tendinitis of left rotator cuff    Chronic left shoulder pain    Elevated troponin    Accelerated hypertension    Metabolic syndrome    Bilateral shoulder region arthritis       Past Medical History:   Diagnosis Date    Anxiety 11/28/2017    Arthritis      Back pain     Colon polyps     reported per patient.     Fuchs' corneal dystrophy     Glaucoma     Hyperlipidemia     Hypertension     Macular degeneration dry    Obesity     Trouble in sleeping     Urinary tract infection        Past Surgical History:   Procedure Laterality Date    ADENOIDECTOMY  1938    BREAST BIOPSY Left     1997. benign    BREAST CYST EXCISION Left 1997 approx    CARPAL TUNNEL RELEASE Right 2012 approx    CATARACT EXTRACTION Bilateral 1994    CHOLECYSTECTOMY  1975    CORNEAL TRANSPLANT Bilateral 08/2015 8/2015 - left; Right 4/2016    EYE SURGERY      Fuch's Corneal dystrophy - had 2nd operation with Dr. Quintanilla    EYE SURGERY      Cataract wIOL    left thumb Left 2011    removed cuboid for arthritis    SLT- OS (aka TRABECULOPLASTY) Left 05/11/2011    repeat 3/14/12    TONSILLECTOMY  1938       Family History   Problem Relation Age of Onset    Heart disease Mother     Hypertension Mother     Cancer Father 88        sarcoma( ?Kaposi's ) on leg    Deep vein thrombosis Neg Hx     Ovarian cancer Neg Hx     Breast cancer Neg Hx     Kidney disease Neg Hx     Strabismus Neg Hx     Retinal detachment Neg Hx     Macular degeneration Neg Hx     Glaucoma Neg Hx     Blindness Neg Hx     Amblyopia Neg Hx     Eczema Neg Hx     Lupus Neg Hx     Melanoma Neg Hx     Psoriasis Neg Hx     Diabetes Neg Hx     Stroke Neg Hx     Mental retardation Neg Hx     Mental illness Neg Hx     Hyperlipidemia Neg Hx     COPD Neg Hx     Asthma Neg Hx     Depression Neg Hx     Alcohol abuse Neg Hx     Drug abuse Neg Hx        Social History     Social History    Marital status:      Spouse name: N/A    Number of children: N/A    Years of education: N/A     Social History Main Topics    Smoking status: Never Smoker    Smokeless tobacco: Never Used      Comment: history of passive smoking from her ex-    Alcohol use 1.2 oz/week     2 Standard drinks or  equivalent per week      Comment: occasional     Drug use: No    Sexual activity: Not Currently     Partners: Male     Birth control/ protection: Post-menopausal      Comment: discussed protection for STD's     Other Topics Concern    Are You Pregnant Or Think You May Be? No    Breast-Feeding No     Social History Narrative     x 2,  x 1. Retired artist - previously doing jewelry/wall pieces; ; lives in [facility]; has 3 sons - 52( lives in [place], 54, and 56;exercises minimally - limited due to back issues. Still drives. Does have a Living Will.           Current Outpatient Prescriptions   Medication Sig Dispense Refill    acetaminophen (TYLENOL) 500 MG tablet Take 500 mg by mouth once daily at 6am. Taking 1 to 3      ALPHA LIPOIC ACID ORAL Take 200 mg by mouth.       ALPRAZolam (XANAX) 0.5 MG tablet TAKE 1/2 - 1 TABLET BY MOUTH NIGHTLY FOR SLEEP OR ANXIETY 30 tablet 2    B complex-vitamin C-folic acid 0.8 mg Tab Take 1 tablet by mouth once daily.      CALCIUM CITRATE ORAL Take by mouth.      cholecalciferol, vitamin D3, (VITAMIN D3) 2,000 unit Cap Take 1 capsule by mouth once daily.      cloNIDine 0.2 mg/24 hr td ptwk (CATAPRES) 0.2 mg/24 hr       coenzyme Q10 200 mg capsule Take 400 mg by mouth once daily.       fish oil-omega-3 fatty acids 300-1,000 mg capsule Take 2 g by mouth once daily.      lifitegrast (XIIDRA) 5 % Dpet Apply 1 drop to eye 2 (two) times daily. 60 each 12    losartan (COZAAR) 50 MG tablet Take 2 tablets (100 mg total) by mouth 2 (two) times daily. (Patient taking differently: Take 50 mg by mouth 2 (two) times daily. ) 30 tablet 0    LOTEMAX 0.5 % DrpG       metoprolol succinate (TOPROL-XL) 50 MG 24 hr tablet Take 1 tablet (50 mg total) by mouth once daily. 30 tablet 6    POLYETHYLENE GLYCOL 3350 (MIRALAX ORAL) Take by mouth as needed.       pyridoxine, vitamin B6, (B-6) 100 MG Tab Take 100 mg by mouth 2 (two) times daily.        travoprost, benzalkonium, (TRAVATAN) 0.004 % ophthalmic solution Place 1 drop into the left eye every evening.      triamterene-hydrochlorothiazide 37.5-25 mg (DYAZIDE) 37.5-25 mg per capsule       TURMERIC ROOT EXTRACT ORAL Take 665 mg by mouth.        No current facility-administered medications for this visit.        Review of patient's allergies indicates:   Allergen Reactions    Claritin [loratadine] Other (See Comments)     Double vision/spots    Hydrocodone-acetaminophen      wheezing    Naproxen      wheezing    Verapamil Diarrhea    Vibramycin [doxycycline calcium] Other (See Comments)     Weakness nausea   sob    Amlodipine      Myalgias      Coreg [carvedilol] Swelling     lips    Fenofibrate      Causes muscle weakness    Hydralazine analogues      Muscle tremors/aches      Isosorbide     Lasix [furosemide]      myalgias    Moxifloxacin Other (See Comments)     Hives   muscle soreness    Timolol     Allegra [fexofenadine] Other (See Comments)     Double vision/spots     Codeine Diarrhea and Other (See Comments)     Loss of balance     Erythromycin Other (See Comments)     Complete weakness/could not walk    Hydrocortisone (bulk) Other (See Comments)     Only suppository/ caused muscle weakness    Macrolide antibiotics Other (See Comments)     Complete weakness/could not walk    Patanol [olopatadine] Other (See Comments)     Muscle weakness    Prednisone Anxiety    Statins-hmg-coa reductase inhibitors Other (See Comments)     Muscle weakness    Xalatan [latanoprost] Other (See Comments)     Muscle weakness           PHYSICAL EXAM     Vitals:    05/30/18 1100   BP: 124/72       PAIN SCALE: 0/10 None    ROS:  GENERAL: No fever, chills, fatigability or weight loss.  ABDOMEN: Appetite fine. No weight loss. Denies diarrhea, abdominal pain, hematemesis or blood in stool.  No change in bowel movement pattern.  URINARY: No flank pain, dysuria or hematuria.  REPRODUCTIVE: No abnormal  vaginal bleeding.  BREASTS: Breasts symmetric, nontender and no lumps detected.    PE:   APPEARANCE: Well nourished, well developed, in no acute distress.  NECK: Neck symmetric without masses or thyromegaly.   NODES: No inguinal lymph node enlargement.  ABDOMEN: Soft. No tenderness or masses. No hepatosplenomegaly. No hernias.  BREASTS: Symmetrical, no skin changes or visible lesions. No palpable masses, nipple discharge or adenopathy bilaterally.    PELVIC:   VULVA: No lesions.  Atrophic but normal female genitalia.  URETHRAL MEATUS: Normal size and location, no lesions, no prolapse.  URETHRA: No masses, tenderness, prolapse or scarring.  VAGINA: dry and narrow w/ atrophy, no discharge, no significant cystocele or rectocele.  CERVIX: No lesions, normal diameter, no stenosis, no cervical motion tenderness.  UTERUS: about 8 week size, mobile, non-tender, normal position, good support. - overall somewhat limited exam due to protuberant abdomen - however it is smaller and flatter than last encounter  ADNEXA: No masses, tenderness or CDS nodularity.  ANUS PERINEUM: No lesions, no relaxation, external hemorrhoids noted.       DIAGNOSIS:   1. Normal gyn exam  2.  Physiologic atrophy    PLAN:   Mammogram    Colonoscopy  dexa      MEDICATIONS PRESCRIBED:   Calcium vitamin D and weight bearing exercise    COUNSELING:  Patient was counseled today on A.C.S. Pap guidelines and recommendations for yearly pelvic exams, mammograms and monthly self breast exams; to see her PCP for other health maintenance.     FOLLOW-UP: With me in 1 year.

## 2018-06-07 ENCOUNTER — HOSPITAL ENCOUNTER (OUTPATIENT)
Dept: RADIOLOGY | Facility: HOSPITAL | Age: 83
Discharge: HOME | End: 2018-06-07
Attending: ANESTHESIOLOGY
Payer: MEDICARE

## 2018-06-07 ENCOUNTER — SURGERY (OUTPATIENT)
Age: 83
End: 2018-06-07

## 2018-06-07 DIAGNOSIS — M25.512 CHRONIC PAIN OF BOTH SHOULDERS: ICD-10-CM

## 2018-06-07 DIAGNOSIS — G89.29 CHRONIC PAIN OF BOTH SHOULDERS: ICD-10-CM

## 2018-06-07 DIAGNOSIS — M25.511 CHRONIC PAIN OF BOTH SHOULDERS: ICD-10-CM

## 2018-06-07 DIAGNOSIS — M19.012 BILATERAL SHOULDER REGION ARTHRITIS: ICD-10-CM

## 2018-06-07 DIAGNOSIS — M19.011 BILATERAL SHOULDER REGION ARTHRITIS: ICD-10-CM

## 2018-06-07 PROCEDURE — 77002 NEEDLE LOCALIZATION BY XRAY: CPT | Mod: TC

## 2018-06-07 RX ADMIN — BUPIVACAINE HYDROCHLORIDE 20 ML: 2.5 INJECTION, SOLUTION EPIDURAL; INFILTRATION; INTRACAUDAL; PERINEURAL at 10:06

## 2018-06-07 RX ADMIN — LIDOCAINE HYDROCHLORIDE 10 ML: 20 INJECTION, SOLUTION EPIDURAL; INFILTRATION; INTRACAUDAL; PERINEURAL at 10:06

## 2018-06-07 NOTE — H&P (VIEW-ONLY)
Chief Pain Complaint:  Shoulder Pain (left)        History of Present Illness:   Shirley Metz is a 85 y.o. female  who is presenting with a chief complaint of Shoulder Pain (left)  . The patient began experiencing this problem insidiously, and the pain has been gradually worsening over the past 2 year(s). The pain is described as throbbing, cramping, aching and heavy and is located in the bilateral shoudlers. Pain is intermittent and lasts hours. The  pain is nonradiating. The patient rates her pain a 8 out of ten and interferes with activities of daily living a 8 out of ten. Pain is exacerbated by abduction of the shoulder joint, and is improved by rest. Patient reports no prior trauma, no prior spinal surgery     - pertinent negatives: No fever, No chills, No weight loss, No bladder dysfunction, No bowel dysfunction, No saddle anesthesia  - pertinent positives: none    - medications, other therapies tried (physical therapy, injections):     >> Tylenol and zanaflex    >> Has previously undergone Physical Therapy    >> Has NOT previously undergone spinal injection/s      Imaging / Labs / Studies (reviewed on 5/14/2018):                                    Results for orders placed during the hospital encounter of 06/11/15   X-Ray Lumbar Spine AP And Lateral    Narrative Comparison: None     two views, 3 images    Findings:    Body habitus, generalized osteopenia and overlying bowel combine to limit the study.  Multilevel marginal spurring and facet arthropathy.  Multilevel loss of disc height and grade 1 anterolisthesis L4 on L5 noted.  No acute fracture.  Pedicles and SI   joints appear intact.  Postsurgical changes right upper quadrant.    Impression       Osteopenia and spondylosis without acute fracture noted.    Grade 1 anterolisthesis L4 on L5.    Follow-up and/or further evaluation as warranted.       Electronically signed by: MAGDY RIOS III, MD  Date:     06/11/15  Time:    13:46               Results for orders placed in visit on 07/05/12   X-Ray Cervical Spine AP And Lateral    Narrative DATE OF EXAM: Jul 5 2012      [facility]   0133  -  C-SPINE 2 OR 3 VIEWS:   \  [id number]     CLINICAL HISTORY:   \723.1 0 CERVICALGIA     PROCEDURE COMMENT:   \     ICD 9 CODE(S):   (\)     CPT 4 CODE(S)/MODIFIER(S):   (\)     Findings: There is moderate to marked multilevel degenerative change with   prominent bilateral facet arthropathy, vertebral end plate spurring,   and/or disk space narrowing from C3-4 through C7-T1.  Minimal degree of   grade 1 anterolisthesis is present at C5-6.  Odontoid and lateral masses   appear intact.  There is no visible fracture.        Impression: As above.  ______________________________________      Electronically signed by: GABRIEL MELGOZA MD  Date:     07/05/12  Time:    08:56            : JOLEEN  Transcribe Date/Time: Jul 5 2012  8:56A  Dictated by : GABRIEL MELGOZA MD  Report reviewed by:   Read On:   \  Images were reviewed, findings were verified and document was   electronically  SIGNED BY: GABRIEL MELGOZA MD On: Jul 5 2012  8:56A          Results for orders placed during the hospital encounter of 04/02/18   X-Ray Cervical Spine Complete 5 view    Narrative EXAMINATION:  XR CERVICAL SPINE COMPLETE 5 VIEW    CLINICAL HISTORY:  . Cervicalgia    TECHNIQUE:  AP, Lateral, bilateral oblique and open mouth views of the cervical spine were performed.    COMPARISON:  None    FINDINGS:  The bones are diffusely osteopenic.  There is degenerative vertebral endplate spurring, bilateral facet arthropathy, and disc space narrowing, most pronounced at C3-4, C5-6, and C6-7.  Variable degree of bony foraminal encroachment noted bilaterally at multiple levels.  Odontoid is intact.  No fracture or subluxation.      Impression As above.      Electronically signed by: ROHAN Melgoza MD  Date:    04/02/2018  Time:    08:42           Review of Systems:  CONSTITUTIONAL: patient  "denies any fever, chills, or weight loss  SKIN: patient denies any rash or itching  RESPIRATORY: patient denies having any shortness of breath  GASTROINTESTINAL: patient denies having any diarrhea, constipation, or bowel incontinence  GENITOURINARY: patient denies having any abnormal bladder function    MUSCULOSKELETAL:  - patient complains of the above noted pain/s (see chief pain complaint)    NEUROLOGICAL:   - pain as above  - strength in Upper extremities is intact, BILATERALLY  - sensation in Upper extremities is intact, BILATERALLY  - patient denies any loss of bowel or bladder control      PSYCHIATRIC: patient denies any change in mood    Other:  All other systems reviewed and are negative      Physical Exam:  BP (!) 155/69   Pulse 73   Ht 5' 4" (1.626 m)   Wt 77.6 kg (171 lb)   BMI 29.35 kg/m²  (reviewed on 5/14/2018)  General: Alert and oriented, in no apparent distress.  Gait: normal gait.  Skin: No rashes, No discoloration, No obvious lesions  HEENT: Normocephalic, atraumatic. Pupils equal and round.  Cardiovascular: Regular rate and rhythm , no significant peripheral edema present  Respiratory: Without audible wheezing, without use of accessory muscles of respiration.    Musculoskeletal:    Cervical Spine    - Pain on flexion of cervical spine Absent  - Spurling's Test:  Absent    - Pain on extension of cervical spine Absent  - TTP over the cervical facet joints Absent  - Cervical facet loading Absent    -TTP over bilateral Shoulder Joints  -ROM slight decresed    Neuro:    Strength:  UE R/L: D: 5/5; B: 5/5; T: 5/5; WF: 5/5; WE: 5/5; IO: 5/5;    Extremity Reflexes: Brisk and symmetric throughout.      Extremity Sensory: Sensation to pinprick and temperature symmetric. Proprioception intact.      Psych:  Mood and affect is appropriate      Assessment:    Shirley Metz is a 85 y.o. year old female who is presenting with    Encounter Diagnoses   Name Primary?    Chronic left shoulder pain  "    Bilateral shoulder region arthritis     Chronic pain of both shoulders Yes       Plan:    1. Interventional: Bilateral Shoulder Injection with local NO STEROID ONLY LIDOCAINE.     2. Pharmacologic: Tylenol PRN.    3. Rehabilitative: Encouraged PT.    4. Diagnostic: None for now.    5. Follow up: Follow-up for After Injection.      20 minutes were spent in this encounter with more than 50% of the time used for counseling and review of the plan.  Imaging / studies reviewed, detailed above.  I discussed in detail the risks, benefits, and alternatives to any and all potential treatment options.  All questions and concerns were fully addressed today in clinic. Medical decision making moderate.    Thank you for the opportunity to assist in the care of this patient.    Best wishes,    Signed:    Gregor Chin MD          Disclaimer:  This note may have been prepared using voice recognition software, it may have not been extensively proofed, as such there could be errors within the text such as sound alike errors.

## 2018-06-08 NOTE — DISCHARGE SUMMARY
[facility]  Discharge Note       Description of Procedure: Bilateral Shoulder Injection under Fluoroscopic Guidance    Procedure Date: 6/8/2018    Admit Date: 6/7/2018  Discharge Date: 6/7/2018     Attending Physician: Giuseppe Bundy   Discharge Provider: Giuseppe Bnudy    Preoperative Diagnosis: Bilateral Shoulder Pain and Osteoarthritis      Postoperative Diagnosis: as above, same as preoperative diagnosis    Discharged Condition: Stable    Hospital Course: Patient was admitted for an outpatient procedure. The procedure was tolerated well with no complications.    Final Diagnoses: Same as principal problem.    Disposition: Home, self-care.    Follow up/Patient Instructions:  Follow-up in clinic in 2-3 weeks.    Medications: No medications were prescribed today. The patient was advised to resume normal medication regimen without change.  Specific information was provided regarding restarting any anticoagulant/s.    Discharge Procedure Orders (must include Diet, Follow-up, Activity):  Light activity for the remainder of the day, resume normal activity tomorrow. Resume normal diet. Follow-up in clinic in 2-3 weeks.

## 2018-06-08 NOTE — OP NOTE
PROCEDURE: Bilateral Shoulder Injection under fluoroscopic guidance       SIDE: Bilateral      PROCEDURE DATE: 06/07/2018     PREOPERATIVE DIAGNOSIS: Bilateral Shoulder Pain   POSTOPERATIVE DIAGNOSIS: Bilateral Shoulder Pain     PROVIDER: Gregor Chin MD  Assistant(s): none     Anesthesia: Local     >> 0 mg of VERSED    >> 0 mcg of FENTANYL      INDICATION: The patient has a history of pain due to shoulder pain unresponsive to conservative treatments. Fluoroscopy was used to optimize visualization of needle placement and to maximize safety.         PROCEDURE DESCRIPTION: The patient was seen and identified in the preoperative area. Risks, benefits, complications, and alternatives were discussed with the patient. The patient agreed to proceed with the procedure and signed the consent. The site and side of the procedure was identified and marked.     Bilateral Shoulder Injection:  The right and left shoulders was prepped and draped in the usual sterile fashion with ChloraPrep. 1% lidocaine through a 27-gauge needle was used to anesthetize the target area, and then a 3-1/2 inch 25-gauge Quincke-point needle advanced under direct vision fluoroscopy toward the right-sided shoulder joint through the lateral approach, and the placement of the needle was confirmed by injecting 1 mL Omnipaque contrast live fluoroscopy, which demonstrated excellent placement of the needle. Aspiration was negative for blood and CSF and air. Subsequently 3 mL solution containing 0.25% Bupuvacaine was injected, and then the needle was withdrawn. The same was done on the left. The patient tolerated the procedure very well and was brought to the postprocedure recovery room in excellent condition     Description of Findings: Not applicable     Prosthetic devices, grafts, tissues, or devices implanted: None     Specimen Removed: No     Estimated Blood Loss: minimal     COMPLICATIONS: None     DISPOSITION / PLANS: The patient was transferred to the  recovery area in a stable condition for observation. The patient was reexamined prior to discharge. There was no evidence of acute neurologic injury following the procedure.  Patient was discharged from the recovery room after meeting discharge criteria. Home discharge instructions were given to the patient by the staff.

## 2018-06-10 ENCOUNTER — NURSE TRIAGE (OUTPATIENT)
Dept: ADMINISTRATIVE | Facility: CLINIC | Age: 83
End: 2018-06-10

## 2018-06-10 NOTE — TELEPHONE ENCOUNTER
Spoke with Dr. Durán. Patient can apply Benadryl cream to site and call PCP in [place] for further tx.  I called to patient and related the same. She voiced understanding.

## 2018-06-10 NOTE — TELEPHONE ENCOUNTER
Reason for Disposition   Patient wants to be seen    Protocols used: ST RASH OR REDNESS - [id number]

## 2018-06-11 ENCOUNTER — OFFICE VISIT (OUTPATIENT)
Dept: ORTHOPEDICS | Facility: CLINIC | Age: 83
End: 2018-06-11
Payer: MEDICARE

## 2018-06-11 ENCOUNTER — OFFICE VISIT (OUTPATIENT)
Dept: CARDIOLOGY | Facility: CLINIC | Age: 83
End: 2018-06-11
Payer: MEDICARE

## 2018-06-11 VITALS
DIASTOLIC BLOOD PRESSURE: 67 MMHG | WEIGHT: 173 LBS | BODY MASS INDEX: 29.53 KG/M2 | HEIGHT: 64 IN | SYSTOLIC BLOOD PRESSURE: 151 MMHG | HEART RATE: 65 BPM

## 2018-06-11 VITALS
DIASTOLIC BLOOD PRESSURE: 58 MMHG | SYSTOLIC BLOOD PRESSURE: 154 MMHG | WEIGHT: 173.06 LBS | BODY MASS INDEX: 29.55 KG/M2 | HEIGHT: 64 IN | HEART RATE: 64 BPM

## 2018-06-11 DIAGNOSIS — R00.2 PALPITATIONS: ICD-10-CM

## 2018-06-11 DIAGNOSIS — M19.012 PRIMARY OSTEOARTHRITIS OF LEFT SHOULDER: ICD-10-CM

## 2018-06-11 DIAGNOSIS — I10 ESSENTIAL HYPERTENSION: Primary | Chronic | ICD-10-CM

## 2018-06-11 DIAGNOSIS — I51.7 LVH (LEFT VENTRICULAR HYPERTROPHY): ICD-10-CM

## 2018-06-11 DIAGNOSIS — M25.512 LEFT SHOULDER PAIN, UNSPECIFIED CHRONICITY: Primary | ICD-10-CM

## 2018-06-11 DIAGNOSIS — E78.5 HYPERLIPIDEMIA, UNSPECIFIED HYPERLIPIDEMIA TYPE: Chronic | ICD-10-CM

## 2018-06-11 DIAGNOSIS — M75.22 BICEPS TENDINITIS OF LEFT SHOULDER: ICD-10-CM

## 2018-06-11 DIAGNOSIS — I49.3 PVC'S (PREMATURE VENTRICULAR CONTRACTIONS): ICD-10-CM

## 2018-06-11 DIAGNOSIS — I51.89 DIASTOLIC DYSFUNCTION WITHOUT HEART FAILURE: Chronic | ICD-10-CM

## 2018-06-11 PROCEDURE — 99213 OFFICE O/P EST LOW 20 MIN: CPT | Mod: S$GLB,,, | Performed by: ORTHOPAEDIC SURGERY

## 2018-06-11 PROCEDURE — 99999 PR PBB SHADOW E&M-EST. PATIENT-LVL V: CPT | Mod: PBBFAC,,, | Performed by: ORTHOPAEDIC SURGERY

## 2018-06-11 PROCEDURE — 99999 PR PBB SHADOW E&M-EST. PATIENT-LVL IV: CPT | Mod: PBBFAC,,, | Performed by: INTERNAL MEDICINE

## 2018-06-11 PROCEDURE — 99499 UNLISTED E&M SERVICE: CPT | Mod: S$GLB,,, | Performed by: INTERNAL MEDICINE

## 2018-06-11 PROCEDURE — 3078F DIAST BP <80 MM HG: CPT | Mod: CPTII,S$GLB,, | Performed by: ORTHOPAEDIC SURGERY

## 2018-06-11 PROCEDURE — 99214 OFFICE O/P EST MOD 30 MIN: CPT | Mod: S$GLB,,, | Performed by: INTERNAL MEDICINE

## 2018-06-11 PROCEDURE — 3078F DIAST BP <80 MM HG: CPT | Mod: CPTII,S$GLB,, | Performed by: INTERNAL MEDICINE

## 2018-06-11 PROCEDURE — 99499 UNLISTED E&M SERVICE: CPT | Mod: S$GLB,,, | Performed by: ORTHOPAEDIC SURGERY

## 2018-06-11 PROCEDURE — 3077F SYST BP >= 140 MM HG: CPT | Mod: CPTII,S$GLB,, | Performed by: ORTHOPAEDIC SURGERY

## 2018-06-11 PROCEDURE — 3077F SYST BP >= 140 MM HG: CPT | Mod: CPTII,S$GLB,, | Performed by: INTERNAL MEDICINE

## 2018-06-11 RX ORDER — METOPROLOL SUCCINATE 25 MG/1
50 TABLET, EXTENDED RELEASE ORAL DAILY
Qty: 180 TABLET | Refills: 3 | Status: SHIPPED | OUTPATIENT
Start: 2018-06-11 | End: 2018-10-10

## 2018-06-11 RX ORDER — CLONIDINE 0.1 MG/24H
1 PATCH, EXTENDED RELEASE TRANSDERMAL
Qty: 12 PATCH | Refills: 3 | Status: SHIPPED | OUTPATIENT
Start: 2018-06-11 | End: 2018-07-18 | Stop reason: SINTOL

## 2018-06-11 RX ORDER — CLONIDINE HYDROCHLORIDE 0.1 MG/1
0.1 TABLET ORAL 2 TIMES DAILY
Qty: 180 TABLET | Refills: 3 | Status: SHIPPED | OUTPATIENT
Start: 2018-06-11 | End: 2019-10-07

## 2018-06-11 RX ORDER — LOSARTAN POTASSIUM 50 MG/1
50 TABLET ORAL 2 TIMES DAILY
Qty: 180 TABLET | Refills: 3 | Status: SHIPPED | OUTPATIENT
Start: 2018-06-11 | End: 2019-08-07 | Stop reason: SDUPTHER

## 2018-06-11 NOTE — PROGRESS NOTES
Subjective:   Patient ID:  Shirley Metz is a 85 y.o. female who presents for cardiac consult of Hypertension      HPI  The patient came in today for cardiac consult of Hypertension    Ms. Flower is an 85 year old female patient with a PMHx of HTN, hyperlipidemia, palpitations who presents today for follow-up.    5/1/18  She has significant side effects from many BP meds, could not tolerate Verapamil recently. BP has improved, but sometimes BP gets too low - occasionally 119/53.   She thinks her BP is too low under 130 systolic and wants to decrease some meds. Discussed we can half the clonidine patch and monitor. Will continue other meds for now.  She has a good log with BPs daily. Overall BP is well controlled now. Tries to eat low salt diet for the most part but is at [facility] home and can't always control the diet.   Other main issue is her arm pain due to shoulder pain will see pain management specialist Dr. Chin.     5/24/18  Has a lot of pain in both arms, will be seeing Dr. Chin and then may need surgery by Dr. Lujan. Reviewed BP log - mostly 130s-140s, once 96/58. Occasional palpitations - usually with waking up or sleeping.     6/11/18  Pt has been getting painful rash at site of clonidine. Also has an infection possibly at left shoulder where procedure happened for shoulders. She is also seeing surgery today for shoulder pain. Pt also feels very weak due to clonidine and has trouble getting up with 0.2 mg patch. Last night BP went up to 190/71 and took a clonidine .1 mg PO dose which improved BP. Bp mildly elevated today but also in a lot of pain.     Patient feels no chest pain, no sob, no leg swelling, no PND,  no dizziness, no syncope, no CNS symptoms.    Patient is compliant with medications.    2D ECHO  CONCLUSIONS     1 - Concentric hypertrophy.     2 - No wall motion abnormalities.     3 - Normal left ventricular systolic function (EF 55-60%).     4 - Normal left ventricular  diastolic function.     5 - Normal right ventricular systolic function .     6 - Mild tricuspid regurgitation.       This document has been electronically    SIGNED BY: Samy Castillo MD On: 03/16/2018 12:40    Past Medical History:   Diagnosis Date    Anxiety 11/28/2017    Arthritis     Back pain     Colon polyps     reported per patient.     Fuchs' corneal dystrophy     Glaucoma     Hyperlipidemia     Hypertension     Macular degeneration dry    Obesity     Trouble in sleeping     Urinary tract infection        Past Surgical History:   Procedure Laterality Date    ADENOIDECTOMY  1938    BREAST BIOPSY Left     1997. benign    BREAST CYST EXCISION Left 1997 approx    CARPAL TUNNEL RELEASE Right 2012 approx    CATARACT EXTRACTION Bilateral 1994    CHOLECYSTECTOMY  1975    CORNEAL TRANSPLANT Bilateral 08/2015 8/2015 - left; Right 4/2016    EYE SURGERY      Fuch's Corneal dystrophy - had 2nd operation with Dr. Quintanilla    EYE SURGERY      Cataract wIOL    left thumb Left 2011    removed cuboid for arthritis    SLT- OS (aka TRABECULOPLASTY) Left 05/11/2011    repeat 3/14/12    TONSILLECTOMY  1938       Social History   Substance Use Topics    Smoking status: Never Smoker    Smokeless tobacco: Never Used      Comment: history of passive smoking from her ex-    Alcohol use 1.2 oz/week     2 Standard drinks or equivalent per week      Comment: occasional        Family History   Problem Relation Age of Onset    Heart disease Mother     Hypertension Mother     Cancer Father 88        sarcoma( ?Kaposi's ) on leg    Deep vein thrombosis Neg Hx     Ovarian cancer Neg Hx     Breast cancer Neg Hx     Kidney disease Neg Hx     Strabismus Neg Hx     Retinal detachment Neg Hx     Macular degeneration Neg Hx     Glaucoma Neg Hx     Blindness Neg Hx     Amblyopia Neg Hx     Eczema Neg Hx     Lupus Neg Hx     Melanoma Neg Hx     Psoriasis Neg Hx     Diabetes Neg Hx     Stroke Neg Hx      Mental retardation Neg Hx     Mental illness Neg Hx     Hyperlipidemia Neg Hx     COPD Neg Hx     Asthma Neg Hx     Depression Neg Hx     Alcohol abuse Neg Hx     Drug abuse Neg Hx        Patient's Medications   New Prescriptions    CLONIDINE (CATAPRES) 0.1 MG TABLET    Take 1 tablet (0.1 mg total) by mouth 2 (two) times daily.    CLONIDINE 0.1 MG/24 HR TD PTWK (CATAPRES) 0.1 MG/24 HR    Place 1 patch onto the skin every 7 days.   Previous Medications    ACETAMINOPHEN (TYLENOL) 500 MG TABLET    Take 500 mg by mouth once daily at 6am. Taking 1 to 3    ALPHA LIPOIC ACID ORAL    Take 200 mg by mouth.     ALPRAZOLAM (XANAX) 0.5 MG TABLET    TAKE 1/2 - 1 TABLET BY MOUTH NIGHTLY FOR SLEEP OR ANXIETY    B COMPLEX-VITAMIN C-FOLIC ACID 0.8 MG TAB    Take 1 tablet by mouth once daily.    CALCIUM CITRATE ORAL    Take by mouth.    CHOLECALCIFEROL, VITAMIN D3, (VITAMIN D3) 2,000 UNIT CAP    Take 1 capsule by mouth once daily.    COENZYME Q10 200 MG CAPSULE    Take 400 mg by mouth once daily.     FISH OIL-OMEGA-3 FATTY ACIDS 300-1,000 MG CAPSULE    Take 2 g by mouth once daily.    LIFITEGRAST (XIIDRA) 5 % DPET    Apply 1 drop to eye 2 (two) times daily.    LOTEMAX 0.5 % DRPG        POLYETHYLENE GLYCOL 3350 (MIRALAX ORAL)    Take by mouth as needed.     PYRIDOXINE, VITAMIN B6, (B-6) 100 MG TAB    Take 100 mg by mouth 2 (two) times daily.     TRAVOPROST, BENZALKONIUM, (TRAVATAN) 0.004 % OPHTHALMIC SOLUTION    Place 1 drop into the left eye every evening.    TRIAMTERENE-HYDROCHLOROTHIAZIDE 37.5-25 MG (DYAZIDE) 37.5-25 MG PER CAPSULE    1 capsule once daily.     TURMERIC ROOT EXTRACT ORAL    Take 665 mg by mouth.    Modified Medications    Modified Medication Previous Medication    LOSARTAN (COZAAR) 50 MG TABLET losartan (COZAAR) 50 MG tablet       Take 1 tablet (50 mg total) by mouth 2 (two) times daily.    Take 2 tablets (100 mg total) by mouth 2 (two) times daily.    METOPROLOL SUCCINATE (TOPROL-XL) 25 MG 24 HR TABLET  "metoprolol succinate (TOPROL-XL) 50 MG 24 hr tablet       Take 2 tablets (50 mg total) by mouth once daily.    Take 1 tablet (50 mg total) by mouth once daily.   Discontinued Medications    CLONIDINE 0.2 MG/24 HR TD PTWK (CATAPRES) 0.2 MG/24 HR           Review of Systems   Constitutional: Negative.    HENT: Negative.    Eyes: Negative.    Respiratory: Negative.    Cardiovascular: Negative for leg swelling.   Gastrointestinal: Negative.    Genitourinary: Negative.    Musculoskeletal: Positive for joint pain.   Skin: Positive for rash.   Neurological: Positive for dizziness.   Endo/Heme/Allergies: Negative.    Psychiatric/Behavioral: Negative.    All 12 systems otherwise negative.      Wt Readings from Last 3 Encounters:   06/11/18 78.5 kg (173 lb 1 oz)   05/30/18 78.6 kg (173 lb 4.5 oz)   05/24/18 77.3 kg (170 lb 6.7 oz)     Temp Readings from Last 3 Encounters:   06/07/18 98 °F (36.7 °C) (Oral)   04/17/18 97.8 °F (36.6 °C)   03/16/18 98.6 °F (37 °C) (Oral)     BP Readings from Last 3 Encounters:   06/11/18 (!) 154/58   06/07/18 (!) 173/80   05/30/18 124/72     Pulse Readings from Last 3 Encounters:   06/11/18 64   06/07/18 64   05/24/18 81       BP (!) 154/58 (BP Method: Large (Manual))   Pulse 64   Ht 5' 4" (1.626 m)   Wt 78.5 kg (173 lb 1 oz)   BMI 29.71 kg/m²     Objective:   Physical Exam   Constitutional: She is oriented to person, place, and time. She appears well-developed and well-nourished. No distress.   HENT:   Head: Normocephalic and atraumatic.   Nose: Nose normal.   Mouth/Throat: Oropharynx is clear and moist.   Eyes: Conjunctivae and EOM are normal. No scleral icterus.   Neck: Normal range of motion. Neck supple. No JVD present. No thyromegaly present.   Cardiovascular: Normal rate, regular rhythm, S1 normal and S2 normal.  Exam reveals no gallop, no S3, no S4 and no friction rub.    No murmur heard.  Pulmonary/Chest: Effort normal and breath sounds normal. No stridor. No respiratory distress. " She has no wheezes. She has no rales. She exhibits no tenderness.   Abdominal: Soft. Bowel sounds are normal. She exhibits no distension and no mass. There is no tenderness. There is no rebound.   Genitourinary:   Genitourinary Comments: Deferred   Musculoskeletal: Normal range of motion. She exhibits no edema, tenderness or deformity.   Lymphadenopathy:     She has no cervical adenopathy.   Neurological: She is alert and oriented to person, place, and time. She exhibits normal muscle tone. Coordination normal.   Skin: Skin is warm and dry. Rash (on anterior chest - 2x2 square of clonide) noted. She is not diaphoretic. There is erythema. No pallor.   Psychiatric: She has a normal mood and affect. Her behavior is normal. Judgment and thought content normal.   Nursing note and vitals reviewed.      Lab Results   Component Value Date     03/16/2018    K 3.9 03/16/2018     03/16/2018    CO2 26 03/16/2018    BUN 24 (H) 03/16/2018    CREATININE 1.0 03/16/2018    GLU 95 03/16/2018    HGBA1C 5.7 (H) 03/16/2018    MG 1.9 03/16/2018    AST 18 03/15/2018    ALT 18 03/15/2018    ALBUMIN 3.8 03/15/2018    PROT 6.9 03/15/2018    BILITOT 0.7 03/15/2018    WBC 6.77 03/16/2018    HGB 11.3 (L) 03/16/2018    HCT 34.2 (L) 03/16/2018    MCV 95 03/16/2018     03/16/2018    INR 1.0 01/25/2018    TSH 1.871 01/04/2017    CHOL 180 03/16/2018    HDL 43 03/16/2018    LDLCALC 117.6 03/16/2018    TRIG 97 03/16/2018     (H) 03/15/2018     Assessment:      1. Essential hypertension    2. Hyperlipidemia, unspecified hyperlipidemia type    3. Diastolic dysfunction without heart failure    4. Palpitations    5. PVC's (premature ventricular contractions)    6. LVH (left ventricular hypertrophy)        Plan:   1. HTN - cannot tolerate clonidine 2 mg patch  - cont clonidine 0.1mg patch, may need x 2; PRN clonidine  - pt has numerous side effects to almost all other BP meds - norvasc, coreg, verapamil  - cont low salt diet    2.  HLD  - cont low manuel diet    3. Diastolc HF  - pt euvolemic  - occ ankle swelling at night but now normal    4. Palpitations  - cont BB    5. Pre-OP CV evaluation prior to shoulder surgery  Low periop risk of CV events for moderate risk procedure.  Ok to proceed to the scheduled surgery without further cardiac study.  Continue Metoprolol periop.    Thank you for allowing me to participate in this patient's care. Please do not hesitate to contact me with any questions or concerns. Consult note has been forwarded to the referral physician.

## 2018-06-11 NOTE — PROGRESS NOTES
"Subjective:     Patient ID: Shirley Metz is a 85 y.o. female.    Chief Complaint: Pain of the Right Shoulder and Pain of the Left Shoulder      New: Since last visit she had bilateral lidocaine injections to shoulders with Dr. Chin, definite short term improvement to the left shoulder immediately after the injection, is interested in possible arthroplasty     Prior: Pt presents to clinic for a new pt consult for bilateral shoulder pain. She c/o bilateral upper arm and shoulder pain x 6 mo. Denies any trauma w/ pain onset. Pain is sharp in bilateral shoulders, L > R, and radiates into pt's arms to the point of her elbows. She was seen in rheumatology in January, started PT x 2 mo which pt reports did help with pain/ROM, states massages helped with "lumps" in her upper arms. Since completing therapy, pain has returned. Pt declined steroid injections in Jan with rheumatology. She is unable to take NSAID's due to CKD/HTN-uncontrolled. She takes otc Tylenol with minimal to moderate pain relief. She denies neck pain, denies sharp shooting pains down either UE , denies N/T. Pt is R handed.       Shoulder Pain    The pain is present in the left shoulder and right shoulder. This is a new problem. The current episode started more than 1 month ago. The problem occurs intermittently. The problem has been unchanged. The quality of the pain is described as sharp, aching and throbbing. The pain is at a severity of 6/10. Associated symptoms include an inability to bear weight, a limited range of motion and stiffness. Pertinent negatives include no fever, itching or numbness. The symptoms are aggravated by activity, bearing weight, lying down, lifting and contact. She has tried OTC pain meds, OTC ointments, heat and brace/corset for the symptoms. The treatment provided mild relief. Physical therapy was ineffective.      Past Medical History:   Diagnosis Date    Anxiety 11/28/2017    Arthritis     Back pain     Colon " polyps     reported per patient.     Fuchs' corneal dystrophy     Glaucoma     Hyperlipidemia     Hypertension     Macular degeneration dry    Obesity     Trouble in sleeping     Urinary tract infection      Past Surgical History:   Procedure Laterality Date    ADENOIDECTOMY  1938    BREAST BIOPSY Left     1997. benign    BREAST CYST EXCISION Left 1997 approx    CARPAL TUNNEL RELEASE Right 2012 approx    CATARACT EXTRACTION Bilateral 1994    CHOLECYSTECTOMY  1975    CORNEAL TRANSPLANT Bilateral 08/2015 8/2015 - left; Right 4/2016    EYE SURGERY      Fuch's Corneal dystrophy - had 2nd operation with Dr. Quintanilla    EYE SURGERY      Cataract wIOL    left thumb Left 2011    removed cuboid for arthritis    SLT- OS (aka TRABECULOPLASTY) Left 05/11/2011    repeat 3/14/12    TONSILLECTOMY  1938     Family History   Problem Relation Age of Onset    Heart disease Mother     Hypertension Mother     Cancer Father 88        sarcoma( ?Kaposi's ) on leg    Deep vein thrombosis Neg Hx     Ovarian cancer Neg Hx     Breast cancer Neg Hx     Kidney disease Neg Hx     Strabismus Neg Hx     Retinal detachment Neg Hx     Macular degeneration Neg Hx     Glaucoma Neg Hx     Blindness Neg Hx     Amblyopia Neg Hx     Eczema Neg Hx     Lupus Neg Hx     Melanoma Neg Hx     Psoriasis Neg Hx     Diabetes Neg Hx     Stroke Neg Hx     Mental retardation Neg Hx     Mental illness Neg Hx     Hyperlipidemia Neg Hx     COPD Neg Hx     Asthma Neg Hx     Depression Neg Hx     Alcohol abuse Neg Hx     Drug abuse Neg Hx      Social History     Social History    Marital status:      Spouse name: N/A    Number of children: N/A    Years of education: N/A     Occupational History    Not on file.     Social History Main Topics    Smoking status: Never Smoker    Smokeless tobacco: Never Used      Comment: history of passive smoking from her ex-    Alcohol use 1.2 oz/week     2 Standard drinks  or equivalent per week      Comment: occasional     Drug use: No    Sexual activity: Not Currently     Partners: Male     Birth control/ protection: Post-menopausal      Comment: discussed protection for STD's     Other Topics Concern    Are You Pregnant Or Think You May Be? No    Breast-Feeding No     Social History Narrative     x 2,  x 1. Retired artist - previously doing jewelry/wall pieces; ; lives in [facility]; has 3 sons - 52( lives in [place], 54, and 56;exercises minimally - limited due to back issues. Still drives. Does have a Living Will.         Medication List with Changes/Refills   Current Medications    ACETAMINOPHEN (TYLENOL) 500 MG TABLET    Take 500 mg by mouth once daily at 6am. Taking 1 to 3    ALPHA LIPOIC ACID ORAL    Take 200 mg by mouth.     ALPRAZOLAM (XANAX) 0.5 MG TABLET    TAKE 1/2 - 1 TABLET BY MOUTH NIGHTLY FOR SLEEP OR ANXIETY    B COMPLEX-VITAMIN C-FOLIC ACID 0.8 MG TAB    Take 1 tablet by mouth once daily.    CALCIUM CITRATE ORAL    Take by mouth.    CHOLECALCIFEROL, VITAMIN D3, (VITAMIN D3) 2,000 UNIT CAP    Take 1 capsule by mouth once daily.    CLONIDINE (CATAPRES) 0.1 MG TABLET    Take 1 tablet (0.1 mg total) by mouth 2 (two) times daily.    CLONIDINE 0.1 MG/24 HR TD PTWK (CATAPRES) 0.1 MG/24 HR    Place 1 patch onto the skin every 7 days.    COENZYME Q10 200 MG CAPSULE    Take 400 mg by mouth once daily.     FISH OIL-OMEGA-3 FATTY ACIDS 300-1,000 MG CAPSULE    Take 2 g by mouth once daily.    LIFITEGRAST (XIIDRA) 5 % DPET    Apply 1 drop to eye 2 (two) times daily.    LOSARTAN (COZAAR) 50 MG TABLET    Take 1 tablet (50 mg total) by mouth 2 (two) times daily.    LOTEMAX 0.5 % DRPG        METOPROLOL SUCCINATE (TOPROL-XL) 25 MG 24 HR TABLET    Take 2 tablets (50 mg total) by mouth once daily.    POLYETHYLENE GLYCOL 3350 (MIRALAX ORAL)    Take by mouth as needed.     PYRIDOXINE, VITAMIN B6, (B-6) 100 MG TAB    Take 100 mg by mouth 2 (two)  times daily.     TRAVOPROST, BENZALKONIUM, (TRAVATAN) 0.004 % OPHTHALMIC SOLUTION    Place 1 drop into the left eye every evening.    TRIAMTERENE-HYDROCHLOROTHIAZIDE 37.5-25 MG (DYAZIDE) 37.5-25 MG PER CAPSULE    1 capsule once daily.     TURMERIC ROOT EXTRACT ORAL    Take 665 mg by mouth.      Review of patient's allergies indicates:   Allergen Reactions    Claritin [loratadine] Other (See Comments)     Double vision/spots    Hydrocodone-acetaminophen      wheezing    Naproxen      wheezing    Vibramycin [doxycycline calcium] Other (See Comments)     Weakness nausea   sob    Amlodipine      Myalgias      Coreg [carvedilol] Swelling     lips    Fenofibrate      Causes muscle weakness    Hydralazine analogues      Muscle tremors/aches      Isosorbide     Lasix [furosemide]      myalgias    Moxifloxacin Other (See Comments)     Hives   muscle soreness    Timolol     Allegra [fexofenadine] Other (See Comments)     Double vision/spots     Codeine Diarrhea and Other (See Comments)     Loss of balance     Erythromycin Other (See Comments)     Complete weakness/could not walk    Hydrocortisone (bulk) Other (See Comments)     Only suppository/ caused muscle weakness    Macrolide antibiotics Other (See Comments)     Complete weakness/could not walk    Patanol [olopatadine] Other (See Comments)     Muscle weakness    Prednisone Anxiety    Statins-hmg-coa reductase inhibitors Other (See Comments)     Muscle weakness    Xalatan [latanoprost] Other (See Comments)     Muscle weakness     Review of Systems   Constitution: Negative for fever.   HENT: Negative for sore throat.    Eyes: Negative for blurred vision.   Cardiovascular: Negative for dyspnea on exertion.   Respiratory: Negative for shortness of breath.    Hematologic/Lymphatic: Does not bruise/bleed easily.   Skin: Negative for itching.   Musculoskeletal: Positive for joint pain, myalgias and stiffness. Negative for back pain, falls and neck pain.    Gastrointestinal: Negative for vomiting.   Genitourinary: Negative for dysuria.   Neurological: Negative for dizziness and numbness.   Psychiatric/Behavioral: The patient does not have insomnia.        Objective:   Body mass index is 29.7 kg/m².  Vitals:    06/11/18 1336   BP: (!) 151/67   Pulse: 65           General    Nursing note and vitals reviewed.  Constitutional: She is oriented to person, place, and time. She appears well-developed and well-nourished.   HENT:   Head: Atraumatic.   Eyes: EOM are normal.   Neck: Neck supple.   Cardiovascular: Normal rate.    Pulmonary/Chest: Effort normal.   Neurological: She is alert and oriented to person, place, and time.   Psychiatric: She has a normal mood and affect. Her behavior is normal.         Back (L-Spine & T-Spine) / Neck (C-Spine) Exam     Comments:  Slow and some stiffness with neck ROM but negative sprurling's  Right Shoulder Exam     Inspection/Observation   Swelling: absent  Deformity: absent    Tenderness   The patient is experiencing no tenderness.        Range of Motion   Active Abduction: 50   Forward Flexion: 160   External Rotation 0 degrees: 30 External Rotation 90 degrees: 60   Internal Rotation 0 degrees: L1     Tests & Signs   Impingement: negative  Active Compression Test (Roger Mills's Sign): negative  Speed's Test: negative  Bear Hug: negative    Other   Sensation: normal    Left Shoulder Exam     Inspection/Observation   Swelling: absent  Deformity: absent    Tenderness   The patient is tender to palpation of the biceps tendon.    Crepitus   The patient has crepitus of the AC joint    Range of Motion   Active Abduction: 40   Forward Flexion: 130   External Rotation 0 degrees: 30 External Rotation 90 degrees: 50   Internal Rotation 0 degrees: L5     Tests & Signs   Hawkin's test: positive  Impingement: negative  Active Compression test (Roger Mills's Sign): negative  Speed's Test: negative  Bear Hug: negative    Other   Sensation: normal       Muscle  Strength   Right Upper Extremity   Shoulder Abduction: 5/5   Supraspinatus: 5/5/5   Subscapularis: 5/5/5   Biceps: 5/5/5   Left Upper Extremity  Shoulder Abduction: 5/5   Supraspinatus: 4/5/5   Subscapularis: 4/5/5   Biceps: 5/5/5     Vascular Exam     Right Pulses      Radial:                    2+      Left Pulses      Radial:                    2+      Capillary Refill  Right Hand: normal capillary refill  Left Hand: normal capillary refill      XRAY BILAT SHOULDERS 03/28/2018: severe glenohumeral degenerative changes present in bilateral shoulders, more significant in L shoulder. Moderate AC joint degenerative changes present in bilateral shoulders. No fracture. No deformity.     XRAY BILAT HUMERUS 03/28/2018: No fracture present. No deformity.       Cervical spine radiographs: reviewed from today's films, she has multi level severe disc space narrowing worst for  C3-4, C5-6, and C6-7 levels, endplate spurring and facet joint degenerative change.      All 3 sets of these prior 3 radiographs sets were reviewed by me and reviewed by me and reviewed with the patient today.      Assessment:     Encounter Diagnoses   Name Primary?    Left shoulder pain, unspecified chronicity Yes    Primary osteoarthritis of left shoulder     Biceps tendinitis of left shoulder         Plan:     -exercises for ROM reviewed and importance reviewed  recommend CT and MRI presurgical planning    Discussed the importance of these preoperative imaging studies to help plan left shoulder arthroplasty.  Discussed that the results of these studies will help determine whether not we can do a total shoulder arthroplasty anatomic, versus reverse total shoulder arthroplasty. Also discussed that CT scan will help planned glenoid version and bone stock available.  These tests are necessary for proper surgical planning and outcomes here.

## 2018-06-13 DIAGNOSIS — G47.00 INSOMNIA, UNSPECIFIED TYPE: ICD-10-CM

## 2018-06-13 DIAGNOSIS — F41.9 ANXIETY: ICD-10-CM

## 2018-06-13 RX ORDER — ALPRAZOLAM 0.5 MG/1
TABLET ORAL
Qty: 30 TABLET | Refills: 2 | Status: SHIPPED | OUTPATIENT
Start: 2018-06-13 | End: 2018-12-28 | Stop reason: SDUPTHER

## 2018-06-13 RX ORDER — ALPRAZOLAM 0.5 MG/1
TABLET ORAL
Qty: 30 TABLET | Refills: 2 | Status: CANCELLED | OUTPATIENT
Start: 2018-06-13

## 2018-06-18 ENCOUNTER — PATIENT MESSAGE (OUTPATIENT)
Dept: ORTHOPEDICS | Facility: CLINIC | Age: 83
End: 2018-06-18

## 2018-06-20 ENCOUNTER — PATIENT MESSAGE (OUTPATIENT)
Dept: ORTHOPEDICS | Facility: CLINIC | Age: 83
End: 2018-06-20

## 2018-06-21 ENCOUNTER — PATIENT MESSAGE (OUTPATIENT)
Dept: CARDIOLOGY | Facility: CLINIC | Age: 83
End: 2018-06-21

## 2018-06-22 ENCOUNTER — OFFICE VISIT (OUTPATIENT)
Dept: INTERNAL MEDICINE | Facility: CLINIC | Age: 83
End: 2018-06-22
Payer: MEDICARE

## 2018-06-22 VITALS
OXYGEN SATURATION: 98 % | DIASTOLIC BLOOD PRESSURE: 70 MMHG | SYSTOLIC BLOOD PRESSURE: 144 MMHG | BODY MASS INDEX: 28.93 KG/M2 | HEART RATE: 64 BPM | HEIGHT: 64 IN | WEIGHT: 169.44 LBS | TEMPERATURE: 97 F

## 2018-06-22 DIAGNOSIS — I10 ESSENTIAL HYPERTENSION: ICD-10-CM

## 2018-06-22 DIAGNOSIS — L23.1 CONTACT DERMATITIS DUE TO ADHESIVES, UNSPECIFIED CONTACT DERMATITIS TYPE: Primary | ICD-10-CM

## 2018-06-22 PROCEDURE — 99499 UNLISTED E&M SERVICE: CPT | Mod: S$GLB,,, | Performed by: FAMILY MEDICINE

## 2018-06-22 PROCEDURE — 3077F SYST BP >= 140 MM HG: CPT | Mod: CPTII,S$GLB,, | Performed by: FAMILY MEDICINE

## 2018-06-22 PROCEDURE — 99999 PR PBB SHADOW E&M-EST. PATIENT-LVL III: CPT | Mod: PBBFAC,,, | Performed by: FAMILY MEDICINE

## 2018-06-22 PROCEDURE — 3078F DIAST BP <80 MM HG: CPT | Mod: CPTII,S$GLB,, | Performed by: FAMILY MEDICINE

## 2018-06-22 PROCEDURE — 99214 OFFICE O/P EST MOD 30 MIN: CPT | Mod: S$GLB,,, | Performed by: FAMILY MEDICINE

## 2018-06-22 RX ORDER — HYDROCORTISONE VALERATE CREAM 2 MG/G
CREAM TOPICAL 2 TIMES DAILY
Status: CANCELLED | OUTPATIENT
Start: 2018-06-22 | End: 2018-07-02

## 2018-06-22 RX ORDER — TRIAMCINOLONE ACETONIDE 5 MG/G
CREAM TOPICAL 2 TIMES DAILY
Status: CANCELLED | OUTPATIENT
Start: 2018-06-22 | End: 2018-07-02

## 2018-06-22 RX ORDER — TRIAMCINOLONE ACETONIDE 1 MG/G
CREAM TOPICAL 2 TIMES DAILY
Qty: 15 G | Refills: 0 | Status: SHIPPED | OUTPATIENT
Start: 2018-06-22 | End: 2018-10-10

## 2018-06-22 NOTE — PROGRESS NOTES
Subjective:       Patient ID: Shirley Metz is a 85 y.o. female.    Chief Complaint: Rash (clonidine patch)    She is here due to problems with clonidine patch. Starts with severe pain, itching after 4-5 days.  Then she has to take it off.  She states she has tried some over-the-counter steroids and some antibiotic ointment.  Her cardiologist prescribed a patch from a different company.  The first patch was removed 4 days ago because of severe itching.  She shows me the area and it is still bright red.  She has a new patch on for the last 4 days and sent for it has not caused any symptoms.  Unfortunately, the patch is very expensive and she can't afford to remove them early before they start causing symptoms.    Her blood pressure has been better apparently with patches. 160/88 at intake with repeat 144/70 in exam room.      Rash       Review of Systems   Skin: Positive for rash.       Objective:      Physical Exam   Constitutional: She is oriented to person, place, and time. She appears well-developed.   HENT:   Head: Normocephalic and atraumatic.   Cardiovascular: Normal rate, regular rhythm and normal heart sounds.    Pulmonary/Chest: Effort normal and breath sounds normal.   Neurological: She is alert and oriented to person, place, and time.   Skin: Skin is warm and dry.   Erythematous, finely papular, oblong patch to her right upper back/shoulder area.  Patch in place to her left upper arm with no surrounding erythema or other changes to the skin.   Psychiatric: She has a normal mood and affect. Her behavior is normal.         Assessment/Plan:     1. Contact dermatitis due to adhesives, unspecified contact dermatitis type  triamcinolone acetonide 0.1% (KENALOG) 0.1 % cream   2. Essential hypertension       She tried OTC steroid - we will try a stronger dose. Also can consider pre-placement steroid treatment if the triamcinolone is not effective to relieve symptoms.  Also advised to try zyrtec at  "night for any itch.  Reviewed in depth her method of taking her blood pressure medicines.  She currently checks her blood pressure twice a day.  If her systolic blood pressure is less than 130, she is holding both her losartan and her metoprolol.  She is taking both of them twice a day so if her blood pressure is over 130 at the next check she will take them both.  She states her last hold was about 2 days ago. She continues to take her Dyazide daily.  She is only taking the clonidine 0.1 mg in the evening and only if her blood pressure is higher than about 150 or 160.  Advised her to try holding only the metoprolol if she has a "low" blood pressure.  Advised her to try and continue to take the losartan.  More than 50% of visit was spent in counseling regarding options, recommendations, medication use, side effects, expectations, and precautions.  Total time spent face-to-face was 25 minutes.      "

## 2018-07-02 ENCOUNTER — TELEPHONE (OUTPATIENT)
Dept: DERMATOLOGY | Facility: CLINIC | Age: 83
End: 2018-07-02

## 2018-07-02 NOTE — TELEPHONE ENCOUNTER
----- Message from Sharmaine Bearden sent at 7/2/2018  1:48 PM CDT -----  Contact: pt  States called to check on her appts and it shows Dr Valadez's appt needs to be rescheduled. Nothing available until October. Please call pt at [phone]. Thank you

## 2018-07-17 ENCOUNTER — OFFICE VISIT (OUTPATIENT)
Dept: DERMATOLOGY | Facility: CLINIC | Age: 83
End: 2018-07-17
Payer: MEDICARE

## 2018-07-17 DIAGNOSIS — Z12.83 SCREENING, MALIGNANT NEOPLASM, SKIN: ICD-10-CM

## 2018-07-17 DIAGNOSIS — D18.00 ANGIOMA: ICD-10-CM

## 2018-07-17 DIAGNOSIS — Z85.828 HISTORY OF SKIN CANCER: ICD-10-CM

## 2018-07-17 DIAGNOSIS — L90.5 SCAR CONDITIONS/SKIN FIBROSIS: Primary | ICD-10-CM

## 2018-07-17 PROCEDURE — 99999 PR PBB SHADOW E&M-EST. PATIENT-LVL II: CPT | Mod: PBBFAC,,, | Performed by: DERMATOLOGY

## 2018-07-17 PROCEDURE — 99213 OFFICE O/P EST LOW 20 MIN: CPT | Mod: S$GLB,,, | Performed by: DERMATOLOGY

## 2018-07-17 NOTE — PATIENT INSTRUCTIONS
Preventing Skin Cancer  Relaxing in the sun may feel good. But it isnt good for your skin. In fact, being exposed to the suns harmful rays is a major cause of skin cancer. This is a serious disease that can be life-threatening. People of all ages and backgrounds are at risk. But in most cases, skin cancer can be prevented.    Your Role in Prevention  You can act today to help prevent skin cancer. Start by avoiding the suns UV (ultraviolet) rays. And dont use tanning beds, which are no safer than the sun. Taking these steps can help keep you from getting skin cancer. It can also help prevent wrinkles and other sun-induced aging effects. Make sure your children also follow these safeguards. Now is the time to start taking preventive steps against skin cancer.  When You Are Outdoors  Protect your skin when you go outdoors during the day. Take precautions whenever you go out to eat, run errands by car or on foot, or do any outdoor activity. There isnt just one easy way to protect your skin. Its best to follow all of these steps:  · Wear tightly woven clothing that covers your skin. Put on a wide-brimmed hat to protect your face, ears, and scalp.  · Watch the clock. Try to avoid the sun between 10 a.m. and 4 p.m., when it is strongest.  · Head for the shade or create your own. Use an umbrella when sitting or strolling.  · Know that the suns rays can reflect off sand, water, and snow. This can harm your skin. Take extra care when you are near reflective surfaces.  · Keep in mind that even when the weather is hazy or cloudy, your skin can be exposed to strong UV rays.  · Shield your skin with sunscreen. Also, apply sunscreen to your childrens skin.  Tips for Using Sunscreen  To help prevent skin cancer, choose the right sunscreen and use it correctly. Try the following tips:  · Choose a sunscreen that has a sun protection factor (SPF) of at least 15. For the best protection, an SPF of at least 30 is preferred.  Also, choose a sunscreen labeled broad spectrum. This will shield you from both UVA and UVB (ultraviolet A and B) rays.  · If one brand irritates your skin, try another, particularly ones without fragrance.  · Use a water-resistant sunscreen if swimming or sweating.  · Reapply sunscreen every 2 hours. If youre active, do this more often.  · Cover any sun-exposed skin, from your face to your feet. Dont forget your ears and your lips.  · Know that while sunscreen helps protect you, it isnt enough. You should also wear protective clothing. And try to stay out of the sun as much as you can, especially from 10 a.m. to 4 p.m.  © 0100-2345 [employer]. [address] [place] [address], [place] [address]. All rights reserved. This information is not intended as a substitute for professional medical care. Always follow your healthcare professional's instructions.

## 2018-07-17 NOTE — PROGRESS NOTES
Subjective:       Patient ID:  Shirley Metz is a 85 y.o. female who presents for   Chief Complaint   Patient presents with    Skin Check     UBSE,, 3 month f/u for BCC to left mid back    Spot     c/o bumps to forehead x 4 months,, itchy     Hx of BCC of the left mid-back (s/p ED&C on 5/21/18), here today for skin check.  She c/o lesions of the forehead, + pruritus.  Tried OTC creams without improvement.         Review of Systems   Constitutional: Negative for fever and chills.   Gastrointestinal: Negative for nausea.   Skin: Negative for daily sunscreen use, activity-related sunscreen use and recent sunburn.   Hematologic/Lymphatic: Does not bruise/bleed easily.        Objective:    Physical Exam   Constitutional: She appears well-developed and well-nourished. No distress.   Neurological: She is alert and oriented to person, place, and time. She is not disoriented.   Psychiatric: She has a normal mood and affect.   Skin:   Areas Examined (abnormalities noted in diagram):   Scalp / Hair Palpated and Inspected  Head / Face Inspection Performed  Neck Inspection Performed  Chest / Axilla Inspection Performed  Abdomen Inspection Performed  Back Inspection Performed  RUE Inspected  LUE Inspection Performed                   Diagram Legend     Erythematous scaling macule/papule c/w actinic keratosis       Vascular papule c/w angioma      Pigmented verrucoid papule/plaque c/w seborrheic keratosis      Yellow umbilicated papule c/w sebaceous hyperplasia      Irregularly shaped tan macule c/w lentigo     1-2 mm smooth white papules consistent with Milia      Movable subcutaneous cyst with punctum c/w epidermal inclusion cyst      Subcutaneous movable cyst c/w pilar cyst      Firm pink to brown papule c/w dermatofibroma      Pedunculated fleshy papule(s) c/w skin tag(s)      Evenly pigmented macule c/w junctional nevus     Mildly variegated pigmented, slightly irregular-bordered macule c/w mildly atypical  nevus      Flesh colored to evenly pigmented papule c/w intradermal nevus       Pink pearly papule/plaque c/w basal cell carcinoma      Erythematous hyperkeratotic cursted plaque c/w SCC      Surgical scar with no sign of skin cancer recurrence      Open and closed comedones      Inflammatory papules and pustules      Verrucoid papule consistent consistent with wart     Erythematous eczematous patches and plaques     Dystrophic onycholytic nail with subungual debris c/w onychomycosis     Umbilicated papule    Erythematous-base heme-crusted tan verrucoid plaque consistent with inflamed seborrheic keratosis     Erythematous Silvery Scaling Plaque c/w Psoriasis     See annotation      Assessment / Plan:        Scar conditions/skin fibrosis  Screening, malignant neoplasm, skin  History of skin cancer  Scar of the left mid-back, hx of NMSC.  No evidence of recurrence on physical exam today.  Continue routine skin surveillance. Daily sunscreen advised.    Angioma  Reassurance given.  Lesions are benign.             Follow-up in about 6 months (around 1/17/2019).

## 2018-07-18 ENCOUNTER — OFFICE VISIT (OUTPATIENT)
Dept: INTERNAL MEDICINE | Facility: CLINIC | Age: 83
End: 2018-07-18
Payer: MEDICARE

## 2018-07-18 VITALS
HEIGHT: 64 IN | BODY MASS INDEX: 29 KG/M2 | HEART RATE: 80 BPM | TEMPERATURE: 98 F | OXYGEN SATURATION: 96 % | WEIGHT: 169.88 LBS | DIASTOLIC BLOOD PRESSURE: 66 MMHG | SYSTOLIC BLOOD PRESSURE: 160 MMHG

## 2018-07-18 DIAGNOSIS — L23.1 CONTACT DERMATITIS DUE TO ADHESIVES, UNSPECIFIED CONTACT DERMATITIS TYPE: ICD-10-CM

## 2018-07-18 DIAGNOSIS — I10 ESSENTIAL HYPERTENSION: Primary | ICD-10-CM

## 2018-07-18 PROCEDURE — 99213 OFFICE O/P EST LOW 20 MIN: CPT | Mod: S$GLB,,, | Performed by: FAMILY MEDICINE

## 2018-07-18 PROCEDURE — 99999 PR PBB SHADOW E&M-EST. PATIENT-LVL III: CPT | Mod: PBBFAC,,, | Performed by: FAMILY MEDICINE

## 2018-07-18 PROCEDURE — 3077F SYST BP >= 140 MM HG: CPT | Mod: CPTII,S$GLB,, | Performed by: FAMILY MEDICINE

## 2018-07-18 PROCEDURE — 3078F DIAST BP <80 MM HG: CPT | Mod: CPTII,S$GLB,, | Performed by: FAMILY MEDICINE

## 2018-07-18 NOTE — PROGRESS NOTES
Subjective:       Patient ID: Shirley Metz is a 85 y.o. female.    Chief Complaint: Hypertension    Here for recheck with contact dermatitis to clonidine patches - on topical steroid - and changes to her management of BP recs at last visit 4 weeks ago. She was holding both bP meds - ARB and BB if BP < 130 - checking BPs 2 x daily. Asked her to simply hold the BB, continue the ARB. The clonidine patches were helping control BP more than prior meds. Possibly because she can't stop and start the patch as she does with oral meds.    Could not continue the patches - too painful sore even with steroid cream.  Stopped patches. Took clonidine only twice since stopping ptatches 4 days ago - she was trying to take them off early but even that did not help - they were getting red after two days.  BPs running 130-140s/60-70. She states she has started acupuncture with Jack Bond one time so far -for B shoulder pain - couldn't sleep, and this is better but both still sore. She has been through 3.5 months PT for shoulders and has been seen by Dr Lujan. She is scheduled for MRI left shoulder tomorrow (open).    She felt better in general - legs loosened up - post acupuncture.     She is still stopping both ARB and BB up to twice daily if BP below 130 - she has stopped both meds for both doses two of the last four days for  or so. hse states she has a lightheaded feeling if her BP goes to 120. She fears falling/fainting. She fainted a few times with vomiting and with some vertigo - nothing in years.      Review of Systems   Respiratory: Negative for chest tightness and shortness of breath.    Cardiovascular: Negative for chest pain, palpitations and leg swelling.   Skin: Positive for rash.       Objective:      Physical Exam   Constitutional: She is oriented to person, place, and time. She appears well-developed.   HENT:   Head: Normocephalic and atraumatic.   Cardiovascular: Normal rate, regular rhythm and  "normal heart sounds.    Pulmonary/Chest: Effort normal and breath sounds normal.   Musculoskeletal: She exhibits no edema.   Neurological: She is alert and oriented to person, place, and time.   Skin: Skin is warm and dry.   Psychiatric: She has a normal mood and affect. Her behavior is normal.         Assessment/Plan:     1. Essential hypertension     2. Contact dermatitis due to adhesives, unspecified contact dermatitis type     still with HTN, better control while on catapres patches as she could not hold them as she does with other meds. Unfortunately has had rash with two different mfger's patches.  Am going to see if she can change her habit of holding metoprolol and the ARB when BP lower than 130.   see pt instructions: Hold only the metoprolol if you have a "low" blood pressure. Consider taking 1/2 the metoprolol instead of none at all at these times.  Please continue to take the losartan twice a day.  She is not open to changing her personal rule of adjusting med if SBP lower than 130.  Recheck 3 months - she will be having surgery.  She again declines PNV 13 or 23.  "

## 2018-07-18 NOTE — PATIENT INSTRUCTIONS
"Hold only the metoprolol if you have a "low" blood pressure. Consider taking 1/2 the metoprolol instead of none at all at these times.    Please continue to take the losartan twice a day.  "

## 2018-07-23 ENCOUNTER — OFFICE VISIT (OUTPATIENT)
Dept: ORTHOPEDICS | Facility: CLINIC | Age: 83
End: 2018-07-23
Payer: MEDICARE

## 2018-07-23 VITALS
BODY MASS INDEX: 28.85 KG/M2 | HEIGHT: 64 IN | HEART RATE: 78 BPM | WEIGHT: 169 LBS | SYSTOLIC BLOOD PRESSURE: 170 MMHG | DIASTOLIC BLOOD PRESSURE: 70 MMHG

## 2018-07-23 DIAGNOSIS — Z01.818 PREOP TESTING: Primary | ICD-10-CM

## 2018-07-23 DIAGNOSIS — M19.012 PRIMARY OSTEOARTHRITIS OF LEFT SHOULDER: Primary | ICD-10-CM

## 2018-07-23 PROCEDURE — 99499 UNLISTED E&M SERVICE: CPT | Mod: HCNC,S$GLB,, | Performed by: ORTHOPAEDIC SURGERY

## 2018-07-23 PROCEDURE — 3077F SYST BP >= 140 MM HG: CPT | Mod: CPTII,S$GLB,, | Performed by: ORTHOPAEDIC SURGERY

## 2018-07-23 PROCEDURE — 3078F DIAST BP <80 MM HG: CPT | Mod: CPTII,S$GLB,, | Performed by: ORTHOPAEDIC SURGERY

## 2018-07-23 PROCEDURE — 99214 OFFICE O/P EST MOD 30 MIN: CPT | Mod: S$GLB,,, | Performed by: ORTHOPAEDIC SURGERY

## 2018-07-23 PROCEDURE — 99999 PR PBB SHADOW E&M-EST. PATIENT-LVL III: CPT | Mod: PBBFAC,,, | Performed by: ORTHOPAEDIC SURGERY

## 2018-07-23 NOTE — PROGRESS NOTES
"Subjective:     Patient ID: Shirley Metz is a 85 y.o. female.    Chief Complaint: Pain of the Left Shoulder      New: Since last visit she had bilateral lidocaine injections to shoulders with Dr. Chin, definite short term improvement to the left shoulder immediately after the injection, is interested in possible arthroplasty     Prior: Pt presents to clinic for a new pt consult for bilateral shoulder pain. She c/o bilateral upper arm and shoulder pain x 6 mo. Denies any trauma w/ pain onset. Pain is sharp in bilateral shoulders, L > R, and radiates into pt's arms to the point of her elbows. She was seen in rheumatology in January, started PT x 2 mo which pt reports did help with pain/ROM, states massages helped with "lumps" in her upper arms. Since completing therapy, pain has returned. Pt declined steroid injections in Jan with rheumatology. She is unable to take NSAID's due to CKD/HTN-uncontrolled. She takes otc Tylenol with minimal to moderate pain relief. She denies neck pain, denies sharp shooting pains down either UE , denies N/T. Pt is R handed.       Shoulder Pain    The pain is present in the left shoulder and right shoulder. This is a new problem. The current episode started more than 1 month ago. The problem occurs intermittently. The problem has been unchanged. The quality of the pain is described as sharp, aching and throbbing. The pain is at a severity of 6/10. Associated symptoms include an inability to bear weight, a limited range of motion and stiffness. Pertinent negatives include no fever, itching or numbness. The symptoms are aggravated by activity, bearing weight, lying down, lifting and contact. She has tried OTC pain meds, OTC ointments, heat and brace/corset for the symptoms. The treatment provided mild relief. Physical therapy was ineffective.      Past Medical History:   Diagnosis Date    Anxiety 11/28/2017    Arthritis     Back pain     Basal cell carcinoma 03/29/2018    " left mid back    Colon polyps     reported per patient.     Fuchs' corneal dystrophy     Glaucoma     Hyperlipidemia     Hypertension     Macular degeneration dry    Obesity     Trouble in sleeping     Urinary tract infection      Past Surgical History:   Procedure Laterality Date    ADENOIDECTOMY  1938    BREAST BIOPSY Left     1997. benign    BREAST CYST EXCISION Left 1997 approx    CARPAL TUNNEL RELEASE Right 2012 approx    CATARACT EXTRACTION Bilateral 1994    CHOLECYSTECTOMY  1975    CORNEAL TRANSPLANT Bilateral 08/2015 8/2015 - left; Right 4/2016    EYE SURGERY      Fuch's Corneal dystrophy - had 2nd operation with Dr. Quintanilla    EYE SURGERY      Cataract wIOL    left thumb Left 2011    removed cuboid for arthritis    SLT- OS (aka TRABECULOPLASTY) Left 05/11/2011    repeat 3/14/12    TONSILLECTOMY  1938     Family History   Problem Relation Age of Onset    Heart disease Mother     Hypertension Mother     Cancer Father 88        sarcoma( ?[place]'s ) on leg    Deep vein thrombosis Neg Hx     Ovarian cancer Neg Hx     Breast cancer Neg Hx     Kidney disease Neg Hx     Strabismus Neg Hx     Retinal detachment Neg Hx     Macular degeneration Neg Hx     Glaucoma Neg Hx     Blindness Neg Hx     Amblyopia Neg Hx     Eczema Neg Hx     Lupus Neg Hx     Melanoma Neg Hx     Psoriasis Neg Hx     Diabetes Neg Hx     Stroke Neg Hx     Mental retardation Neg Hx     Mental illness Neg Hx     Hyperlipidemia Neg Hx     COPD Neg Hx     Asthma Neg Hx     Depression Neg Hx     Alcohol abuse Neg Hx     Drug abuse Neg Hx      Social History     Social History    Marital status:      Spouse name: N/A    Number of children: N/A    Years of education: N/A     Occupational History    Not on file.     Social History Main Topics    Smoking status: Never Smoker    Smokeless tobacco: Never Used      Comment: history of passive smoking from her ex-    Alcohol use 1.2  oz/week     2 Standard drinks or equivalent per week      Comment: occasional     Drug use: No    Sexual activity: Not Currently     Partners: Male     Birth control/ protection: Post-menopausal      Comment: discussed protection for STD's     Other Topics Concern    Are You Pregnant Or Think You May Be? No    Breast-Feeding No     Social History Narrative     x 2,  x 1. Retired artist - previously doing jewelry/wall pieces; ; lives in [facility]; has 3 sons - 52( lives in [place], 54, and 56;exercises minimally - limited due to back issues. Still drives. Does have a Living Will.         Medication List with Changes/Refills   Current Medications    ACETAMINOPHEN (TYLENOL) 500 MG TABLET    Take 500 mg by mouth once daily at 6am. Taking 1 to 3    ALPHA LIPOIC ACID ORAL    Take 200 mg by mouth.     ALPRAZOLAM (XANAX) 0.5 MG TABLET    TAKE 1/2 - 1 TABLET BY MOUTH NIGHTLY FOR SLEEP OR ANXIETY    B COMPLEX-VITAMIN C-FOLIC ACID 0.8 MG TAB    Take 1 tablet by mouth once daily.    CALCIUM CITRATE ORAL    Take by mouth.    CHOLECALCIFEROL, VITAMIN D3, (VITAMIN D3) 2,000 UNIT CAP    Take 1 capsule by mouth once daily.    CLONIDINE (CATAPRES) 0.1 MG TABLET    Take 1 tablet (0.1 mg total) by mouth 2 (two) times daily.    COENZYME Q10 200 MG CAPSULE    Take 400 mg by mouth once daily.     FISH OIL-OMEGA-3 FATTY ACIDS 300-1,000 MG CAPSULE    Take 2 g by mouth once daily.    LIFITEGRAST (XIIDRA) 5 % DPET    Apply 1 drop to eye 2 (two) times daily.    LOSARTAN (COZAAR) 50 MG TABLET    Take 1 tablet (50 mg total) by mouth 2 (two) times daily.    LOTEMAX 0.5 % DRPG        METOPROLOL SUCCINATE (TOPROL-XL) 25 MG 24 HR TABLET    Take 2 tablets (50 mg total) by mouth once daily.    POLYETHYLENE GLYCOL 3350 (MIRALAX ORAL)    Take by mouth as needed.     PYRIDOXINE, VITAMIN B6, (B-6) 100 MG TAB    Take 100 mg by mouth 2 (two) times daily.     TRAVOPROST, BENZALKONIUM, (TRAVATAN) 0.004 % OPHTHALMIC  SOLUTION    Place 1 drop into the left eye every evening.    TRIAMCINOLONE ACETONIDE 0.1% (KENALOG) 0.1 % CREAM    Apply topically 2 (two) times daily. For up to one week as needed    TRIAMTERENE-HYDROCHLOROTHIAZIDE 37.5-25 MG (DYAZIDE) 37.5-25 MG PER CAPSULE    1 capsule once daily.     TURMERIC ROOT EXTRACT ORAL    Take 665 mg by mouth.      Review of patient's allergies indicates:   Allergen Reactions    Claritin [loratadine] Other (See Comments)     Double vision/spots    Hydrocodone-acetaminophen      wheezing    Naproxen      wheezing    Vibramycin [doxycycline calcium] Other (See Comments)     Weakness nausea   sob    Amlodipine      Myalgias      Coreg [carvedilol] Swelling     lips    Fenofibrate      Causes muscle weakness    Hydralazine analogues      Muscle tremors/aches      Isosorbide     Lasix [furosemide]      myalgias    Moxifloxacin Other (See Comments)     Hives   muscle soreness    Timolol     Allegra [fexofenadine] Other (See Comments)     Double vision/spots     Codeine Diarrhea and Other (See Comments)     Loss of balance     Erythromycin Other (See Comments)     Complete weakness/could not walk    Hydrocortisone (bulk) Other (See Comments)     Only suppository/ caused muscle weakness    Macrolide antibiotics Other (See Comments)     Complete weakness/could not walk    Patanol [olopatadine] Other (See Comments)     Muscle weakness    Prednisone Anxiety    Statins-hmg-coa reductase inhibitors Other (See Comments)     Muscle weakness    Xalatan [latanoprost] Other (See Comments)     Muscle weakness     Review of Systems   Constitution: Negative for fever.   HENT: Negative for sore throat.    Eyes: Negative for blurred vision.   Cardiovascular: Negative for dyspnea on exertion.   Respiratory: Negative for shortness of breath.    Hematologic/Lymphatic: Does not bruise/bleed easily.   Skin: Negative for itching.   Musculoskeletal: Positive for joint pain, myalgias and  stiffness. Negative for back pain, falls and neck pain.   Gastrointestinal: Negative for vomiting.   Genitourinary: Negative for dysuria.   Neurological: Negative for dizziness and numbness.   Psychiatric/Behavioral: The patient does not have insomnia.        Objective:   Body mass index is 29.01 kg/m².  Vitals:    07/23/18 1020   BP: (!) 170/70   Pulse: 78           General    Nursing note and vitals reviewed.  Constitutional: She is oriented to person, place, and time. She appears well-developed and well-nourished.   HENT:   Head: Atraumatic.   Eyes: EOM are normal.   Neck: Neck supple.   Cardiovascular: Normal rate.    Pulmonary/Chest: Effort normal.   Neurological: She is alert and oriented to person, place, and time.   Psychiatric: She has a normal mood and affect. Her behavior is normal.         Back (L-Spine & T-Spine) / Neck (C-Spine) Exam     Comments:  Slow and some stiffness with neck ROM but negative sprurling's  Right Shoulder Exam     Inspection/Observation   Swelling: absent  Deformity: absent    Tenderness   The patient is experiencing no tenderness.        Range of Motion   Active Abduction: 50   Forward Flexion: 160   External Rotation 0 degrees: 30 External Rotation 90 degrees: 60   Internal Rotation 0 degrees: L1     Tests & Signs   Impingement: negative  Active Compression Test ([place]or[place]'s Sign): negative  Speed's Test: negative  Bear Hug: negative    Other   Sensation: normal    Left Shoulder Exam     Inspection/Observation   Swelling: absent  Deformity: absent    Tenderness   The patient is tender to palpation of the biceps tendon.    Crepitus   The patient has crepitus of the AC joint    Range of Motion   Active Abduction: 40   Forward Flexion: 130   External Rotation 0 degrees: 30 External Rotation 90 degrees: 50   Internal Rotation 0 degrees: L5     Tests & Signs   Hawkin's test: positive  Impingement: negative  Active Compression test ([place]'s Sign): negative  Speed's Test: negative  Bear  Hug: negative    Other   Sensation: normal       Muscle Strength   Right Upper Extremity   Shoulder Abduction: 5/5   Supraspinatus: 5/5/5   Subscapularis: 5/5/5   Biceps: 5/5/5   Left Upper Extremity  Shoulder Abduction: 5/5   Supraspinatus: 4/5/5   Subscapularis: 4/5/5   Biceps: 5/5/5     Vascular Exam     Right Pulses      Radial:                    2+      Left Pulses      Radial:                    2+      Capillary Refill  Right Hand: normal capillary refill  Left Hand: normal capillary refill      XRAY BILAT SHOULDERS 03/28/2018: severe glenohumeral degenerative changes present in bilateral shoulders, more significant in L shoulder. Moderate AC joint degenerative changes present in bilateral shoulders. No fracture. No deformity.     XRAY BILAT HUMERUS 03/28/2018: No fracture present. No deformity.       Cervical spine radiographs: reviewed from today's films, she has multi level severe disc space narrowing worst for  C3-4, C5-6, and C6-7 levels, endplate spurring and facet joint degenerative change.      All 3 sets of these prior 3 radiographs sets were reviewed by me and reviewed by me and reviewed with the patient today.        MRI reviewed  CT reviewed    Shows cyst humeral head and shaft, no excessive biconcave glenoid, adequate glenoid vault, rotator cuff looks intact with only small insertional tendinosis. Evidence AVN humeral head.    Assessment:     Encounter Diagnosis   Name Primary?    Primary osteoarthritis of left shoulder Yes    Left shoulder avascular necrosis    Plan:       We reviewed with Shirley today, the pathology and natural history of her diagnosis. We have discussed a variety of treatment options including medications, physical therapy and other alternative treatments. I also explained the indications, risks and benefits of surgery. After discussion, Shirley decided to proceed with surgery. The decision was made to go forward with     Left shoulder total shoulder arthroplasty, possible  reverse total shoulder arthroplasty, biceps tenodesis.      The details of the surgical procedure were explained, including the location of probable incisions and a description of likely hardware and/or grafts to be used.  The patient understands the likely convalescence after surgery.  Also, we have thoroughly discussed the risks, benefits and alternatives to surgery, including, but not limited to, the risk of infection, joint stiffness, blood clot (including DVT and/or pulmonary embolus), neurologic and vascular injury.  It was explained that, if tissue has been repaired or reconstructed, there is a chance of failure, which may require further management. Discussed risk of loosening, poly wear, dislocation, continued pain, worsening pain, notching, possible need for more surgery, risks of anesthesia heart attack, stroke, death. Medical clearance per protocol.      All of the patient's questions were answered and informed consent was obtained. The patient will contact us if they have any questions or concerns in the interim.

## 2018-07-23 NOTE — H&P (VIEW-ONLY)
"Subjective:     Patient ID: Shirley Metz is a 85 y.o. female.    Chief Complaint: Pain of the Left Shoulder      New: Since last visit she had bilateral lidocaine injections to shoulders with Dr. Chin, definite short term improvement to the left shoulder immediately after the injection, is interested in possible arthroplasty     Prior: Pt presents to clinic for a new pt consult for bilateral shoulder pain. She c/o bilateral upper arm and shoulder pain x 6 mo. Denies any trauma w/ pain onset. Pain is sharp in bilateral shoulders, L > R, and radiates into pt's arms to the point of her elbows. She was seen in rheumatology in January, started PT x 2 mo which pt reports did help with pain/ROM, states massages helped with "lumps" in her upper arms. Since completing therapy, pain has returned. Pt declined steroid injections in Jan with rheumatology. She is unable to take NSAID's due to CKD/HTN-uncontrolled. She takes otc Tylenol with minimal to moderate pain relief. She denies neck pain, denies sharp shooting pains down either UE , denies N/T. Pt is R handed.       Shoulder Pain    The pain is present in the left shoulder and right shoulder. This is a new problem. The current episode started more than 1 month ago. The problem occurs intermittently. The problem has been unchanged. The quality of the pain is described as sharp, aching and throbbing. The pain is at a severity of 6/10. Associated symptoms include an inability to bear weight, a limited range of motion and stiffness. Pertinent negatives include no fever, itching or numbness. The symptoms are aggravated by activity, bearing weight, lying down, lifting and contact. She has tried OTC pain meds, OTC ointments, heat and brace/corset for the symptoms. The treatment provided mild relief. Physical therapy was ineffective.      Past Medical History:   Diagnosis Date    Anxiety 11/28/2017    Arthritis     Back pain     Basal cell carcinoma 03/29/2018    " left mid back    Colon polyps     reported per patient.     Fuchs' corneal dystrophy     Glaucoma     Hyperlipidemia     Hypertension     Macular degeneration dry    Obesity     Trouble in sleeping     Urinary tract infection      Past Surgical History:   Procedure Laterality Date    ADENOIDECTOMY  1938    BREAST BIOPSY Left     1997. benign    BREAST CYST EXCISION Left 1997 approx    CARPAL TUNNEL RELEASE Right 2012 approx    CATARACT EXTRACTION Bilateral 1994    CHOLECYSTECTOMY  1975    CORNEAL TRANSPLANT Bilateral 08/2015 8/2015 - left; Right 4/2016    EYE SURGERY      Fuch's Corneal dystrophy - had 2nd operation with Dr. Quintanilla    EYE SURGERY      Cataract wIOL    left thumb Left 2011    removed cuboid for arthritis    SLT- OS (aka TRABECULOPLASTY) Left 05/11/2011    repeat 3/14/12    TONSILLECTOMY  1938     Family History   Problem Relation Age of Onset    Heart disease Mother     Hypertension Mother     Cancer Father 88        sarcoma( ?Kaposi's ) on leg    Deep vein thrombosis Neg Hx     Ovarian cancer Neg Hx     Breast cancer Neg Hx     Kidney disease Neg Hx     Strabismus Neg Hx     Retinal detachment Neg Hx     Macular degeneration Neg Hx     Glaucoma Neg Hx     Blindness Neg Hx     Amblyopia Neg Hx     Eczema Neg Hx     Lupus Neg Hx     Melanoma Neg Hx     Psoriasis Neg Hx     Diabetes Neg Hx     Stroke Neg Hx     Mental retardation Neg Hx     Mental illness Neg Hx     Hyperlipidemia Neg Hx     COPD Neg Hx     Asthma Neg Hx     Depression Neg Hx     Alcohol abuse Neg Hx     Drug abuse Neg Hx      Social History     Social History    Marital status:      Spouse name: N/A    Number of children: N/A    Years of education: N/A     Occupational History    Not on file.     Social History Main Topics    Smoking status: Never Smoker    Smokeless tobacco: Never Used      Comment: history of passive smoking from her ex-    Alcohol use 1.2  oz/week     2 Standard drinks or equivalent per week      Comment: occasional     Drug use: No    Sexual activity: Not Currently     Partners: Male     Birth control/ protection: Post-menopausal      Comment: discussed protection for STD's     Other Topics Concern    Are You Pregnant Or Think You May Be? No    Breast-Feeding No     Social History Narrative     x 2,  x 1. Retired artist - previously doing jewelry/wall pieces; ; lives in [facility]; has 3 sons - 52( lives in [place], 54, and 56;exercises minimally - limited due to back issues. Still drives. Does have a Living Will.         Medication List with Changes/Refills   Current Medications    ACETAMINOPHEN (TYLENOL) 500 MG TABLET    Take 500 mg by mouth once daily at 6am. Taking 1 to 3    ALPHA LIPOIC ACID ORAL    Take 200 mg by mouth.     ALPRAZOLAM (XANAX) 0.5 MG TABLET    TAKE 1/2 - 1 TABLET BY MOUTH NIGHTLY FOR SLEEP OR ANXIETY    B COMPLEX-VITAMIN C-FOLIC ACID 0.8 MG TAB    Take 1 tablet by mouth once daily.    CALCIUM CITRATE ORAL    Take by mouth.    CHOLECALCIFEROL, VITAMIN D3, (VITAMIN D3) 2,000 UNIT CAP    Take 1 capsule by mouth once daily.    CLONIDINE (CATAPRES) 0.1 MG TABLET    Take 1 tablet (0.1 mg total) by mouth 2 (two) times daily.    COENZYME Q10 200 MG CAPSULE    Take 400 mg by mouth once daily.     FISH OIL-OMEGA-3 FATTY ACIDS 300-1,000 MG CAPSULE    Take 2 g by mouth once daily.    LIFITEGRAST (XIIDRA) 5 % DPET    Apply 1 drop to eye 2 (two) times daily.    LOSARTAN (COZAAR) 50 MG TABLET    Take 1 tablet (50 mg total) by mouth 2 (two) times daily.    LOTEMAX 0.5 % DRPG        METOPROLOL SUCCINATE (TOPROL-XL) 25 MG 24 HR TABLET    Take 2 tablets (50 mg total) by mouth once daily.    POLYETHYLENE GLYCOL 3350 (MIRALAX ORAL)    Take by mouth as needed.     PYRIDOXINE, VITAMIN B6, (B-6) 100 MG TAB    Take 100 mg by mouth 2 (two) times daily.     TRAVOPROST, BENZALKONIUM, (TRAVATAN) 0.004 % OPHTHALMIC  SOLUTION    Place 1 drop into the left eye every evening.    TRIAMCINOLONE ACETONIDE 0.1% (KENALOG) 0.1 % CREAM    Apply topically 2 (two) times daily. For up to one week as needed    TRIAMTERENE-HYDROCHLOROTHIAZIDE 37.5-25 MG (DYAZIDE) 37.5-25 MG PER CAPSULE    1 capsule once daily.     TURMERIC ROOT EXTRACT ORAL    Take 665 mg by mouth.      Review of patient's allergies indicates:   Allergen Reactions    Claritin [loratadine] Other (See Comments)     Double vision/spots    Hydrocodone-acetaminophen      wheezing    Naproxen      wheezing    Vibramycin [doxycycline calcium] Other (See Comments)     Weakness nausea   sob    Amlodipine      Myalgias      Coreg [carvedilol] Swelling     lips    Fenofibrate      Causes muscle weakness    Hydralazine analogues      Muscle tremors/aches      Isosorbide     Lasix [furosemide]      myalgias    Moxifloxacin Other (See Comments)     Hives   muscle soreness    Timolol     Allegra [fexofenadine] Other (See Comments)     Double vision/spots     Codeine Diarrhea and Other (See Comments)     Loss of balance     Erythromycin Other (See Comments)     Complete weakness/could not walk    Hydrocortisone (bulk) Other (See Comments)     Only suppository/ caused muscle weakness    Macrolide antibiotics Other (See Comments)     Complete weakness/could not walk    Patanol [olopatadine] Other (See Comments)     Muscle weakness    Prednisone Anxiety    Statins-hmg-coa reductase inhibitors Other (See Comments)     Muscle weakness    Xalatan [latanoprost] Other (See Comments)     Muscle weakness     Review of Systems   Constitution: Negative for fever.   HENT: Negative for sore throat.    Eyes: Negative for blurred vision.   Cardiovascular: Negative for dyspnea on exertion.   Respiratory: Negative for shortness of breath.    Hematologic/Lymphatic: Does not bruise/bleed easily.   Skin: Negative for itching.   Musculoskeletal: Positive for joint pain, myalgias and  stiffness. Negative for back pain, falls and neck pain.   Gastrointestinal: Negative for vomiting.   Genitourinary: Negative for dysuria.   Neurological: Negative for dizziness and numbness.   Psychiatric/Behavioral: The patient does not have insomnia.        Objective:   Body mass index is 29.01 kg/m².  Vitals:    07/23/18 1020   BP: (!) 170/70   Pulse: 78           General    Nursing note and vitals reviewed.  Constitutional: She is oriented to person, place, and time. She appears well-developed and well-nourished.   HENT:   Head: Atraumatic.   Eyes: EOM are normal.   Neck: Neck supple.   Cardiovascular: Normal rate.    Pulmonary/Chest: Effort normal.   Neurological: She is alert and oriented to person, place, and time.   Psychiatric: She has a normal mood and affect. Her behavior is normal.         Back (L-Spine & T-Spine) / Neck (C-Spine) Exam     Comments:  Slow and some stiffness with neck ROM but negative sprurling's  Right Shoulder Exam     Inspection/Observation   Swelling: absent  Deformity: absent    Tenderness   The patient is experiencing no tenderness.        Range of Motion   Active Abduction: 50   Forward Flexion: 160   External Rotation 0 degrees: 30 External Rotation 90 degrees: 60   Internal Rotation 0 degrees: L1     Tests & Signs   Impingement: negative  Active Compression Test ([place]'s Sign): negative  Speed's Test: negative  Bear Hug: negative    Other   Sensation: normal    Left Shoulder Exam     Inspection/Observation   Swelling: absent  Deformity: absent    Tenderness   The patient is tender to palpation of the biceps tendon.    Crepitus   The patient has crepitus of the AC joint    Range of Motion   Active Abduction: 40   Forward Flexion: 130   External Rotation 0 degrees: 30 External Rotation 90 degrees: 50   Internal Rotation 0 degrees: L5     Tests & Signs   Hawkin's test: positive  Impingement: negative  Active Compression test ([place]'s Sign): negative  Speed's Test: negative  Bear  Hug: negative    Other   Sensation: normal       Muscle Strength   Right Upper Extremity   Shoulder Abduction: 5/5   Supraspinatus: 5/5/5   Subscapularis: 5/5/5   Biceps: 5/5/5   Left Upper Extremity  Shoulder Abduction: 5/5   Supraspinatus: 4/5/5   Subscapularis: 4/5/5   Biceps: 5/5/5     Vascular Exam     Right Pulses      Radial:                    2+      Left Pulses      Radial:                    2+      Capillary Refill  Right Hand: normal capillary refill  Left Hand: normal capillary refill      XRAY BILAT SHOULDERS 03/28/2018: severe glenohumeral degenerative changes present in bilateral shoulders, more significant in L shoulder. Moderate AC joint degenerative changes present in bilateral shoulders. No fracture. No deformity.     XRAY BILAT HUMERUS 03/28/2018: No fracture present. No deformity.       Cervical spine radiographs: reviewed from today's films, she has multi level severe disc space narrowing worst for  C3-4, C5-6, and C6-7 levels, endplate spurring and facet joint degenerative change.      All 3 sets of these prior 3 radiographs sets were reviewed by me and reviewed by me and reviewed with the patient today.        MRI reviewed  CT reviewed    Shows cyst humeral head and shaft, no excessive biconcave glenoid, adequate glenoid vault, rotator cuff looks intact with only small insertional tendinosis. Evidence AVN humeral head.    Assessment:     Encounter Diagnosis   Name Primary?    Primary osteoarthritis of left shoulder Yes    Left shoulder avascular necrosis    Plan:       We reviewed with Shirley today, the pathology and natural history of her diagnosis. We have discussed a variety of treatment options including medications, physical therapy and other alternative treatments. I also explained the indications, risks and benefits of surgery. After discussion, Shirley decided to proceed with surgery. The decision was made to go forward with     Left shoulder total shoulder arthroplasty, possible  reverse total shoulder arthroplasty, biceps tenodesis.      The details of the surgical procedure were explained, including the location of probable incisions and a description of likely hardware and/or grafts to be used.  The patient understands the likely convalescence after surgery.  Also, we have thoroughly discussed the risks, benefits and alternatives to surgery, including, but not limited to, the risk of infection, joint stiffness, blood clot (including DVT and/or pulmonary embolus), neurologic and vascular injury.  It was explained that, if tissue has been repaired or reconstructed, there is a chance of failure, which may require further management. Discussed risk of loosening, poly wear, dislocation, continued pain, worsening pain, notching, possible need for more surgery, risks of anesthesia heart attack, stroke, death. Medical clearance per protocol.      All of the patient's questions were answered and informed consent was obtained. The patient will contact us if they have any questions or concerns in the interim.

## 2018-07-24 ENCOUNTER — PATIENT MESSAGE (OUTPATIENT)
Dept: INTERNAL MEDICINE | Facility: CLINIC | Age: 83
End: 2018-07-24

## 2018-07-24 ENCOUNTER — LAB VISIT (OUTPATIENT)
Dept: LAB | Facility: HOSPITAL | Age: 83
End: 2018-07-24
Attending: ORTHOPAEDIC SURGERY
Payer: MEDICARE

## 2018-07-24 ENCOUNTER — OFFICE VISIT (OUTPATIENT)
Dept: CARDIOLOGY | Facility: CLINIC | Age: 83
End: 2018-07-24
Payer: MEDICARE

## 2018-07-24 VITALS
HEART RATE: 68 BPM | DIASTOLIC BLOOD PRESSURE: 64 MMHG | SYSTOLIC BLOOD PRESSURE: 154 MMHG | HEIGHT: 64 IN | WEIGHT: 169.56 LBS | BODY MASS INDEX: 28.95 KG/M2

## 2018-07-24 DIAGNOSIS — M19.012 PRIMARY OSTEOARTHRITIS OF LEFT SHOULDER: Primary | ICD-10-CM

## 2018-07-24 DIAGNOSIS — I10 ESSENTIAL HYPERTENSION: Primary | Chronic | ICD-10-CM

## 2018-07-24 DIAGNOSIS — Z01.818 PREOP TESTING: ICD-10-CM

## 2018-07-24 DIAGNOSIS — R00.2 PALPITATIONS: ICD-10-CM

## 2018-07-24 DIAGNOSIS — I11.9 HYPERTENSIVE LEFT VENTRICULAR HYPERTROPHY, WITHOUT HEART FAILURE: Chronic | ICD-10-CM

## 2018-07-24 DIAGNOSIS — I49.3 PVC'S (PREMATURE VENTRICULAR CONTRACTIONS): ICD-10-CM

## 2018-07-24 DIAGNOSIS — E78.49 OTHER HYPERLIPIDEMIA: Chronic | ICD-10-CM

## 2018-07-24 DIAGNOSIS — I51.89 DIASTOLIC DYSFUNCTION WITHOUT HEART FAILURE: Chronic | ICD-10-CM

## 2018-07-24 DIAGNOSIS — I70.0 CALCIFICATION OF AORTA: Chronic | ICD-10-CM

## 2018-07-24 DIAGNOSIS — I50.30 HEART FAILURE WITH PRESERVED LEFT VENTRICULAR FUNCTION (HFPEF): ICD-10-CM

## 2018-07-24 LAB
ALBUMIN SERPL BCP-MCNC: 3.8 G/DL
ALP SERPL-CCNC: 74 U/L
ALT SERPL W/O P-5'-P-CCNC: 14 U/L
ANION GAP SERPL CALC-SCNC: 13 MMOL/L
AST SERPL-CCNC: 18 U/L
BASOPHILS # BLD AUTO: 0.04 K/UL
BASOPHILS NFR BLD: 0.5 %
BILIRUB SERPL-MCNC: 0.5 MG/DL
BUN SERPL-MCNC: 40 MG/DL
CALCIUM SERPL-MCNC: 10.7 MG/DL
CHLORIDE SERPL-SCNC: 103 MMOL/L
CO2 SERPL-SCNC: 25 MMOL/L
CREAT SERPL-MCNC: 1.4 MG/DL
DIFFERENTIAL METHOD: ABNORMAL
EOSINOPHIL # BLD AUTO: 0.2 K/UL
EOSINOPHIL NFR BLD: 2.3 %
ERYTHROCYTE [DISTWIDTH] IN BLOOD BY AUTOMATED COUNT: 12.7 %
EST. GFR  (AFRICAN AMERICAN): 39.5 ML/MIN/1.73 M^2
EST. GFR  (NON AFRICAN AMERICAN): 34.3 ML/MIN/1.73 M^2
GLUCOSE SERPL-MCNC: 98 MG/DL
HCT VFR BLD AUTO: 39.1 %
HGB BLD-MCNC: 12.8 G/DL
IMM GRANULOCYTES # BLD AUTO: 0.02 K/UL
IMM GRANULOCYTES NFR BLD AUTO: 0.3 %
LYMPHOCYTES # BLD AUTO: 2.3 K/UL
LYMPHOCYTES NFR BLD: 29.1 %
MCH RBC QN AUTO: 32.2 PG
MCHC RBC AUTO-ENTMCNC: 32.7 G/DL
MCV RBC AUTO: 99 FL
MONOCYTES # BLD AUTO: 0.8 K/UL
MONOCYTES NFR BLD: 9.7 %
NEUTROPHILS # BLD AUTO: 4.5 K/UL
NEUTROPHILS NFR BLD: 58.1 %
NRBC BLD-RTO: 0 /100 WBC
PLATELET # BLD AUTO: 260 K/UL
PMV BLD AUTO: 10.7 FL
POTASSIUM SERPL-SCNC: 4.3 MMOL/L
PROT SERPL-MCNC: 7.8 G/DL
RBC # BLD AUTO: 3.97 M/UL
SODIUM SERPL-SCNC: 141 MMOL/L
WBC # BLD AUTO: 7.81 K/UL

## 2018-07-24 PROCEDURE — 80053 COMPREHEN METABOLIC PANEL: CPT

## 2018-07-24 PROCEDURE — 99999 PR PBB SHADOW E&M-EST. PATIENT-LVL III: CPT | Mod: PBBFAC,,, | Performed by: INTERNAL MEDICINE

## 2018-07-24 PROCEDURE — 85025 COMPLETE CBC W/AUTO DIFF WBC: CPT

## 2018-07-24 PROCEDURE — 36415 COLL VENOUS BLD VENIPUNCTURE: CPT

## 2018-07-24 PROCEDURE — 3077F SYST BP >= 140 MM HG: CPT | Mod: CPTII,S$GLB,, | Performed by: INTERNAL MEDICINE

## 2018-07-24 PROCEDURE — 3078F DIAST BP <80 MM HG: CPT | Mod: CPTII,S$GLB,, | Performed by: INTERNAL MEDICINE

## 2018-07-24 PROCEDURE — 99214 OFFICE O/P EST MOD 30 MIN: CPT | Mod: S$GLB,,, | Performed by: INTERNAL MEDICINE

## 2018-07-24 NOTE — PROGRESS NOTES
Subjective:   Patient ID:  Shirley Metz is a 85 y.o. female who presents for cardiac consult of Hypertension (pre-op) and Hyperlipidemia      HPI  The patient came in today for cardiac consult of Hypertension (pre-op) and Hyperlipidemia    Ms. Flower is an 85 year old female patient with HTN, hyperlipidemia, palpitations presents today for follow-up.    5/1/18  She has significant side effects from many BP meds, could not tolerate Verapamil recently. BP has improved, but sometimes BP gets too low - occasionally 119/53.   She thinks her BP is too low under 130 systolic and wants to decrease some meds. Discussed we can half the clonidine patch and monitor. Will continue other meds for now.  She has a good log with BPs daily. Overall BP is well controlled now. Tries to eat low salt diet for the most part but is at Nursing home and can't always control the diet.   Other main issue is her arm pain due to shoulder pain will see pain management specialist Dr. Chin.     5/24/18  Has a lot of pain in both arms, will be seeing Dr. Chin and then may need surgery by Dr. Lujan. Reviewed BP log - mostly 130s-140s, once 96/58. Occasional palpitations - usually with waking up or sleeping.     6/11/18  Pt has been getting painful rash at site of clonidine. Also has an infection possibly at left shoulder where procedure happened for shoulders. She is also seeing surgery today for shoulder pain. Pt also feels very weak due to clonidine and has trouble getting up with 0.2 mg patch. Last night BP went up to 190/71 and took a clonidine .1 mg PO dose which improved BP. Bp mildly elevated today but also in a lot of pain.     7/24/18  Pt is scheduled for shoulder surgery on shoulder Aug 21st. She has been getting accupuncture which has helped to walk. She has stopped clonidine patch is taking pills BID/TID. BP overall have been well controlled, 140-150s/50-60s. Otherwise feels ok.     Patient feels no chest pain, no  sob, no leg swelling, no PND,  no dizziness, no syncope, no CNS symptoms.    Patient is compliant with medications.    2D ECHO  CONCLUSIONS     1 - Concentric hypertrophy.     2 - No wall motion abnormalities.     3 - Normal left ventricular systolic function (EF 55-60%).     4 - Normal left ventricular diastolic function.     5 - Normal right ventricular systolic function .     6 - Mild tricuspid regurgitation.       This document has been electronically    SIGNED BY: Samy Castillo MD On: 03/16/2018 12:40    Past Medical History:   Diagnosis Date    Anxiety 11/28/2017    Arthritis     Back pain     Basal cell carcinoma 03/29/2018    left mid back    Colon polyps     reported per patient.     Fuchs' corneal dystrophy     Glaucoma     Heart failure with preserved left ventricular function (HFpEF) 7/24/2018    Hyperlipidemia     Hypertension     Macular degeneration dry    Obesity     Trouble in sleeping     Urinary tract infection        Past Surgical History:   Procedure Laterality Date    ADENOIDECTOMY  1938    BREAST BIOPSY Left     1997. benign    BREAST CYST EXCISION Left 1997 approx    CARPAL TUNNEL RELEASE Right 2012 approx    CATARACT EXTRACTION Bilateral 1994    CHOLECYSTECTOMY  1975    CORNEAL TRANSPLANT Bilateral 08/2015 8/2015 - left; Right 4/2016    EYE SURGERY      Fuch's Corneal dystrophy - had 2nd operation with Dr. Quintanilla    EYE SURGERY      Cataract wIOL    left thumb Left 2011    removed cuboid for arthritis    SLT- OS (aka TRABECULOPLASTY) Left 05/11/2011    repeat 3/14/12    TONSILLECTOMY  1938       Social History   Substance Use Topics    Smoking status: Never Smoker    Smokeless tobacco: Never Used      Comment: history of passive smoking from her ex-    Alcohol use 1.2 oz/week     2 Standard drinks or equivalent per week      Comment: occasional        Family History   Problem Relation Age of Onset    Heart disease Mother     Hypertension Mother      Cancer Father 88        sarcoma( ?Kaposi's ) on leg    Deep vein thrombosis Neg Hx     Ovarian cancer Neg Hx     Breast cancer Neg Hx     Kidney disease Neg Hx     Strabismus Neg Hx     Retinal detachment Neg Hx     Macular degeneration Neg Hx     Glaucoma Neg Hx     Blindness Neg Hx     Amblyopia Neg Hx     Eczema Neg Hx     Lupus Neg Hx     Melanoma Neg Hx     Psoriasis Neg Hx     Diabetes Neg Hx     Stroke Neg Hx     Mental retardation Neg Hx     Mental illness Neg Hx     Hyperlipidemia Neg Hx     COPD Neg Hx     Asthma Neg Hx     Depression Neg Hx     Alcohol abuse Neg Hx     Drug abuse Neg Hx        Patient's Medications   New Prescriptions    No medications on file   Previous Medications    ACETAMINOPHEN (TYLENOL) 500 MG TABLET    Take 500 mg by mouth once daily at 6am. Taking 1 to 3    ALPHA LIPOIC ACID ORAL    Take 200 mg by mouth.     ALPRAZOLAM (XANAX) 0.5 MG TABLET    TAKE 1/2 - 1 TABLET BY MOUTH NIGHTLY FOR SLEEP OR ANXIETY    B COMPLEX-VITAMIN C-FOLIC ACID 0.8 MG TAB    Take 1 tablet by mouth once daily.    BOSWELLIA LASHAWN EXTRACT (BOSWELLIA LASHAWN XT, BULK, MISC)    by Misc.(Non-Drug; Combo Route) route.    CALCIUM CITRATE ORAL    Take by mouth.    CHOLECALCIFEROL, VITAMIN D3, (VITAMIN D3) 2,000 UNIT CAP    Take 1 capsule by mouth once daily.    CLONIDINE (CATAPRES) 0.1 MG TABLET    Take 1 tablet (0.1 mg total) by mouth 2 (two) times daily.    COENZYME Q10 200 MG CAPSULE    Take 400 mg by mouth once daily.     FISH OIL-OMEGA-3 FATTY ACIDS 300-1,000 MG CAPSULE    Take 2 g by mouth once daily.    LIFITEGRAST (XIIDRA) 5 % DPET    Apply 1 drop to eye 2 (two) times daily.    LOSARTAN (COZAAR) 50 MG TABLET    Take 1 tablet (50 mg total) by mouth 2 (two) times daily.    LOTEMAX 0.5 % DRPG        METOPROLOL SUCCINATE (TOPROL-XL) 25 MG 24 HR TABLET    Take 2 tablets (50 mg total) by mouth once daily.    POLYETHYLENE GLYCOL 3350 (MIRALAX ORAL)    Take by mouth as needed.      "PYRIDOXINE, VITAMIN B6, (B-6) 100 MG TAB    Take 100 mg by mouth 2 (two) times daily.     TRAVOPROST, BENZALKONIUM, (TRAVATAN) 0.004 % OPHTHALMIC SOLUTION    Place 1 drop into the left eye every evening.    TRIAMCINOLONE ACETONIDE 0.1% (KENALOG) 0.1 % CREAM    Apply topically 2 (two) times daily. For up to one week as needed    TRIAMTERENE-HYDROCHLOROTHIAZIDE 37.5-25 MG (DYAZIDE) 37.5-25 MG PER CAPSULE    1 capsule once daily.     TURMERIC ROOT EXTRACT ORAL    Take 665 mg by mouth.    Modified Medications    No medications on file   Discontinued Medications    No medications on file       Review of Systems   Constitutional: Negative.    HENT: Negative.    Eyes: Negative.    Respiratory: Negative.    Cardiovascular: Negative for leg swelling.   Gastrointestinal: Negative.    Genitourinary: Negative.    Musculoskeletal: Positive for joint pain.   Skin: Positive for rash.   Neurological: Positive for dizziness.   Endo/Heme/Allergies: Negative.    Psychiatric/Behavioral: Negative.    All 12 systems otherwise negative.      Wt Readings from Last 3 Encounters:   07/24/18 76.9 kg (169 lb 8.5 oz)   07/23/18 76.7 kg (169 lb)   07/18/18 77 kg (169 lb 13.8 oz)     Temp Readings from Last 3 Encounters:   07/18/18 97.7 °F (36.5 °C) (Tympanic)   06/22/18 97.2 °F (36.2 °C) (Tympanic)   06/07/18 98 °F (36.7 °C) (Oral)     BP Readings from Last 3 Encounters:   07/24/18 (!) 154/64   07/23/18 (!) 170/70   07/18/18 (!) 160/66     Pulse Readings from Last 3 Encounters:   07/24/18 68   07/23/18 78   07/18/18 80       BP (!) 154/64 (BP Method: Small (Manual))   Pulse 68   Ht 5' 4" (1.626 m)   Wt 76.9 kg (169 lb 8.5 oz)   BMI 29.10 kg/m²     Objective:   Physical Exam   Constitutional: She is oriented to person, place, and time. She appears well-developed and well-nourished. No distress.   HENT:   Head: Normocephalic and atraumatic.   Nose: Nose normal.   Mouth/Throat: Oropharynx is clear and moist.   Eyes: Conjunctivae and EOM are " normal. No scleral icterus.   Neck: Normal range of motion. Neck supple. No JVD present. No thyromegaly present.   Cardiovascular: Normal rate, regular rhythm, S1 normal and S2 normal.  Exam reveals no gallop, no S3, no S4 and no friction rub.    No murmur heard.  Pulmonary/Chest: Effort normal and breath sounds normal. No stridor. No respiratory distress. She has no wheezes. She has no rales. She exhibits no tenderness.   Abdominal: Soft. Bowel sounds are normal. She exhibits no distension and no mass. There is no tenderness. There is no rebound.   Genitourinary:   Genitourinary Comments: Deferred   Musculoskeletal: Normal range of motion. She exhibits no edema, tenderness or deformity.   Lymphadenopathy:     She has no cervical adenopathy.   Neurological: She is alert and oriented to person, place, and time. She exhibits normal muscle tone. Coordination normal.   Skin: Skin is warm and dry. Rash (on anterior chest - 2x2 square of clonide) noted. She is not diaphoretic. There is erythema. No pallor.   Psychiatric: She has a normal mood and affect. Her behavior is normal. Judgment and thought content normal.   Nursing note and vitals reviewed.      Lab Results   Component Value Date     03/16/2018    K 3.9 03/16/2018     03/16/2018    CO2 26 03/16/2018    BUN 24 (H) 03/16/2018    CREATININE 1.0 03/16/2018    GLU 95 03/16/2018    HGBA1C 5.7 (H) 03/16/2018    MG 1.9 03/16/2018    AST 18 03/15/2018    ALT 18 03/15/2018    ALBUMIN 3.8 03/15/2018    PROT 6.9 03/15/2018    BILITOT 0.7 03/15/2018    WBC 6.77 03/16/2018    HGB 11.3 (L) 03/16/2018    HCT 34.2 (L) 03/16/2018    MCV 95 03/16/2018     03/16/2018    INR 1.0 01/25/2018    TSH 1.871 01/04/2017    CHOL 180 03/16/2018    HDL 43 03/16/2018    LDLCALC 117.6 03/16/2018    TRIG 97 03/16/2018     (H) 03/15/2018     Assessment:      1. Essential hypertension    2. Other hyperlipidemia    3. Hypertensive left ventricular hypertrophy, without heart  failure    4. Diastolic dysfunction without heart failure    5. Calcification of aorta    6. Palpitations    7. PVC's (premature ventricular contractions)    8. Heart failure with preserved left ventricular function (HFpEF)        Plan:   1. HTN - well controlled overall  - cont current meds   - pt has numerous side effects to almost all other BP meds - norvasc, coreg, verapamil  - cont low salt diet    2. HLD  - cont low manuel diet    3. Diastolc HF  - pt euvolemic  - occ ankle swelling at night but now normal    4. Palpitations  - cont BB    5. Pre-OP CV evaluation prior to shoulder surgery  Low periop risk of CV events for moderate risk procedure.  Ok to proceed to the scheduled surgery without further cardiac study.  Continue Metoprolol periop.      Thank you for allowing me to participate in this patient's care. Please do not hesitate to contact me with any questions or concerns. Consult note has been forwarded to the referral physician.

## 2018-07-25 NOTE — TELEPHONE ENCOUNTER
Patient states that she is scheduled for left shoulder joint replacement operation on 8/21/18. She had blood work done and her BUN test showed 40. She wants to know is there anything that she can do about this?    Please Advise

## 2018-08-06 ENCOUNTER — OFFICE VISIT (OUTPATIENT)
Dept: OPHTHALMOLOGY | Facility: CLINIC | Age: 83
End: 2018-08-06
Payer: MEDICARE

## 2018-08-06 DIAGNOSIS — H52.7 REFRACTIVE ERROR: ICD-10-CM

## 2018-08-06 DIAGNOSIS — H40.1134 PRIMARY OPEN ANGLE GLAUCOMA OF BOTH EYES, INDETERMINATE STAGE: Primary | ICD-10-CM

## 2018-08-06 DIAGNOSIS — Z94.7 HISTORY OF CORNEA TRANSPLANT: ICD-10-CM

## 2018-08-06 DIAGNOSIS — Z96.1 PSEUDOPHAKIA: ICD-10-CM

## 2018-08-06 PROCEDURE — 92133 CPTRZD OPH DX IMG PST SGM ON: CPT | Mod: S$GLB,,, | Performed by: OPHTHALMOLOGY

## 2018-08-06 PROCEDURE — 92012 INTRM OPH EXAM EST PATIENT: CPT | Mod: S$GLB,,, | Performed by: OPHTHALMOLOGY

## 2018-08-06 PROCEDURE — 99999 PR PBB SHADOW E&M-EST. PATIENT-LVL II: CPT | Mod: PBBFAC,,, | Performed by: OPHTHALMOLOGY

## 2018-08-06 PROCEDURE — 92015 DETERMINE REFRACTIVE STATE: CPT | Mod: S$GLB,,, | Performed by: OPHTHALMOLOGY

## 2018-08-06 RX ORDER — TRAVOPROST OPHTHALMIC SOLUTION 0.04 MG/ML
1 SOLUTION OPHTHALMIC NIGHTLY
Qty: 3 ML | Refills: 12 | Status: SHIPPED | OUTPATIENT
Start: 2018-08-06 | End: 2019-09-03 | Stop reason: SDUPTHER

## 2018-08-06 NOTE — PROGRESS NOTES
SUBJECTIVE:   Shirley Metz is a 85 y.o. female   Corrected distance visual acuity was 20/40 in the right eye and 20/70 +1 in the left eye.   Chief Complaint   Patient presents with    Glaucoma        HPI:  HPI     Pt here for 3m IOP chk. No pain or discomfort. VA stable. 100% compliant   with gtts.     1. COAG Goal < 19  SLT OD 3/04 (20 to 15) + 3/11 (19 to 15)  SLT OS 5/05 (20 to 14) +5/11 (18-19 to 15) + 3/12 (min resp.) + 3/11/15   (20.5-17.5) + 3/7/18 (24- 21)  (Cosopt, Xalatan, and Timolol cause Myalgias)  (Alphagan causes redness) (zioptan too expensive)  2. PCIOL OU  3. Dry AMD  4. Fuch's Dystrophy   DSAEK OD 4/16 Dr. Quintanilla (followed by Dwight every summer)  DSAEK OS 8/15 Dr. Quintanilla  5. Dry Eye -intolerance to Restasis       Lotemax qd ou  Systane gel ou  Travatan qhs os    Last edited by Carmelo Banegas, Patient Care Assistant on 8/6/2018  8:21   AM. (History)        Assessment /Plan :  1. Primary open angle glaucoma of both eyes, indeterminate stage   Doing well, IOP OD within acceptable range relative to target IOP and no evidence of progression. Continue current treatment. Reviewed importance of continued compliance with treatment and follow up.  IOP OS not within acceptable range relative to target IOP with risk of irreversible visual loss. Better IOP control is recommended. Discussed options, risks, and benefits of additional medication, SLT laser, and/or incisional glaucoma surgery. Reviewed importance of continued compliance with treatment and follow up.     Patient chooses to add Rhopressa qhs OS   Continue Travatan Z qhs OS and Lotemax qd OU     2. Pseudophakia  -- Condition stable, no therapeutic change required. Monitoring routinely.     3. History of cornea transplant stable   4.      Refractive Error distance rx and rx for readers    Return to clinic in 2 weeks  or as needed.  With IOP Check

## 2018-08-17 ENCOUNTER — PATIENT MESSAGE (OUTPATIENT)
Dept: SURGERY | Facility: HOSPITAL | Age: 83
End: 2018-08-17

## 2018-08-17 ENCOUNTER — PES CALL (OUTPATIENT)
Dept: ADMINISTRATIVE | Facility: CLINIC | Age: 83
End: 2018-08-17

## 2018-08-20 ENCOUNTER — TELEPHONE (OUTPATIENT)
Dept: PHARMACY | Facility: CLINIC | Age: 83
End: 2018-08-20

## 2018-08-20 ENCOUNTER — ANESTHESIA EVENT (OUTPATIENT)
Dept: SURGERY | Facility: HOSPITAL | Age: 83
DRG: 483 | End: 2018-08-20
Payer: MEDICARE

## 2018-08-20 ENCOUNTER — TELEPHONE (OUTPATIENT)
Dept: ORTHOPEDICS | Facility: CLINIC | Age: 83
End: 2018-08-20

## 2018-08-20 ENCOUNTER — OFFICE VISIT (OUTPATIENT)
Dept: OPHTHALMOLOGY | Facility: CLINIC | Age: 83
End: 2018-08-20
Payer: MEDICARE

## 2018-08-20 DIAGNOSIS — Z96.1 PSEUDOPHAKIA: ICD-10-CM

## 2018-08-20 DIAGNOSIS — H40.1134 PRIMARY OPEN ANGLE GLAUCOMA OF BOTH EYES, INDETERMINATE STAGE: Primary | ICD-10-CM

## 2018-08-20 PROCEDURE — 92012 INTRM OPH EXAM EST PATIENT: CPT | Mod: S$GLB,,, | Performed by: OPHTHALMOLOGY

## 2018-08-20 PROCEDURE — 99999 PR PBB SHADOW E&M-EST. PATIENT-LVL II: CPT | Mod: PBBFAC,,, | Performed by: OPHTHALMOLOGY

## 2018-08-20 NOTE — PRE-PROCEDURE INSTRUCTIONS
Pre op instructions reviewed with patient per phone:    To confirm, Your surgeon has instructed you:  Surgery is scheduled 8/21/18 at 0700.      Please report to [facility] 1st floor main lobby by 0500.   Pre admit office to call this afternoon only if arrival time for surgery changes      INSTRUCTIONS IMPORTANT!!!  ¨ Do not eat, drink, or smoke after 12 midnight-including water. OK to brush teeth, no gum, candy or mints!    ¨ Take only these medicines with a small swallow of water-morning of surgery.  Losartan    ____  Do not wear makeup, including mascara.  ____  No powder, lotions or creams to surgical area.  ____  Please remove all jewelry, including piercings and leave at home.  ____  No money or valuables needed. Please leave at home.  ____  Please bring identification and insurance information to hospital.  ____  If going home the same day, arrange for a ride home. You will not be able to   drive if Anesthesia was used.  ____  Children, under 12 years old, must remain in the waiting room with an adult.  They are not allowed in patient areas.  ____  Wear loose fitting clothing. Allow for dressings, bandages.  ____  Stop Aspirin, Ibuprofen, Motrin and Aleve at least 5-7 days before surgery, unless otherwise instructed by your doctor, or the nurse.   You MAY use Tylenol/acetaminophen until day of surgery.  ____  If you take diabetic medication, do not take am of surgery unless instructed by   Doctor.  ____ Stop taking any Fish Oil supplement or any Vitamins that contain Vitamin E at least 5 days prior to surgery.          Bathing Instructions-- The night before surgery and the morning prior to coming to the hospital:   -Do not shave the surgical area.   -Shower and wash your hair and body as usual with anti-bacterial  soap and shampoo.   -Rinse your hair and body completely.   -Use one packet of hibiclens to wash the surgical site (using your hand) gently for 5 minutes.  Do not scrub you  skin too hard.   -Do not use hibiclens on your head, face, or genitals.   -Do not wash with anti-bacterial soap after you use the hibiclens.   -Rinse your body thoroughly.   -Dry with clean, soft towel.  Do not use lotion, cream, deodorant, or powders on   the surgical site.    Use antibacterial soap in place of hibiclens if your surgery is on the head, face or genitals.         Surgical Site Infection    Prevention of surgical site infections:     -Keep incisions clean and dry.   -Do not soak/submerge incisions in water until completely healed.   -Do not apply lotions, powders, creams, or deodorants to site.   -Always make sure hands are cleaned with antibacterial soap/ alcohol-based   prior to touching the surgical site.  (This includes doctors, nurses, staff, and yourself.)    Signs and symptoms:   -Redness and pain around the area where you had surgery   -Drainage of cloudy fluid from your surgical wound   -Fever over 100.4  I have read or had read and explained to me, and understand the above information.

## 2018-08-20 NOTE — TELEPHONE ENCOUNTER
Patient notified face to face PA required for Rhopressa by insurance.    Explained that PA has been submitted to her insurance company, [employer], today, 8/20/18, @ 12:20pm and can take up to 72 hours.    Will notify patient and provider upon approval / denial.    PT verbalized understanding.    Thanks,   Pily Perdomo CPhT, B.A  Patient Care Advocate   [facility]  Phone: [phone] Ext [phone]  Fax: [phone]

## 2018-08-20 NOTE — PROGRESS NOTES
SUBJECTIVE:   Shirley Metz is a 85 y.o. female   Corrected distance visual acuity was 20/25 -2 in the right eye and 20/30 -2 in the left eye.   Chief Complaint   Patient presents with    Glaucoma     2 wk IOP check. rhopressa trial.         HPI:  HPI     Glaucoma      Additional comments: 2 wk IOP check. rhopressa trial.               Comments     1. COAG Goal < 19  SLT OD 3/04 (20 to 15) + 3/11 (19 to 15)  SLT OS 5/05 (20 to 14) +5/11 (18-19 to 15) + 3/12 (min resp.) + 3/11/15   (20.5-17.5) + 3/7/18 (24- 21)  (Cosopt, Xalatan, and Timolol cause Myalgias)  (Alphagan causes redness) (zioptan too expensive)  2. PCIOL OU  3. Dry AMD  4. Fuch's Dystrophy   DSAEK OD 4/16 Dr. Quintanilla (followed by Dwight every summer)  DSAEK OS 8/15 Dr. Quintanilla  5. Dry Eye -intolerance to Restasis       Lotemax qd ou  Systane gel ou  Travatan Z qhs os  Rhopressa qhs OS          Last edited by Sofia Vizcaino MA on 8/20/2018  8:45 AM. (History)        Assessment /Plan :  1. Primary open angle glaucoma of both eyes, indeterminate stage IOP not within acceptable range relative to target IOP with risk of irreversible visual loss. Better IOP control is recommended. Discussed options, risks, and benefits of additional medication, SLT laser, and/or incisional glaucoma surgery. Reviewed importance of continued compliance with treatment and follow up.     Patient chooses decrease Lotemax to once a day every other day OS cont current glaucoma drops       IOP Improving but still above Target    2. Pseudophakia  -- Condition stable, no therapeutic change required. Monitoring routinely.           RTC 3-4 weeks IOP check

## 2018-08-20 NOTE — TELEPHONE ENCOUNTER
Discussed w patient to come in at 7:30 am to preop for shoulder surgery, she expressed good understanding. Will do her case second tomorrow.

## 2018-08-21 ENCOUNTER — ANESTHESIA (OUTPATIENT)
Dept: SURGERY | Facility: HOSPITAL | Age: 83
DRG: 483 | End: 2018-08-21
Payer: MEDICARE

## 2018-08-21 ENCOUNTER — PES CALL (OUTPATIENT)
Dept: ADMINISTRATIVE | Facility: CLINIC | Age: 83
End: 2018-08-21

## 2018-08-21 ENCOUNTER — HOSPITAL ENCOUNTER (INPATIENT)
Facility: HOSPITAL | Age: 83
LOS: 2 days | Discharge: HOME HEALTH | DRG: 483 | End: 2018-08-23
Attending: ORTHOPAEDIC SURGERY | Admitting: ORTHOPAEDIC SURGERY
Payer: MEDICARE

## 2018-08-21 DIAGNOSIS — Z96.612 S/P REVERSE TOTAL SHOULDER ARTHROPLASTY, LEFT: ICD-10-CM

## 2018-08-21 DIAGNOSIS — G89.29 CHRONIC LEFT SHOULDER PAIN: Primary | ICD-10-CM

## 2018-08-21 DIAGNOSIS — M19.012 BILATERAL SHOULDER REGION ARTHRITIS: ICD-10-CM

## 2018-08-21 DIAGNOSIS — M19.012 ARTHRITIS OF LEFT SHOULDER REGION: ICD-10-CM

## 2018-08-21 DIAGNOSIS — M25.512 CHRONIC LEFT SHOULDER PAIN: Primary | ICD-10-CM

## 2018-08-21 DIAGNOSIS — M19.011 BILATERAL SHOULDER REGION ARTHRITIS: ICD-10-CM

## 2018-08-21 LAB
ABO + RH BLD: NORMAL
BLD GP AB SCN CELLS X3 SERPL QL: NORMAL

## 2018-08-21 PROCEDURE — 63600175 PHARM REV CODE 636 W HCPCS: Performed by: PHYSICIAN ASSISTANT

## 2018-08-21 PROCEDURE — 63600175 PHARM REV CODE 636 W HCPCS: Performed by: ANESTHESIOLOGY

## 2018-08-21 PROCEDURE — C1769 GUIDE WIRE: HCPCS | Performed by: ORTHOPAEDIC SURGERY

## 2018-08-21 PROCEDURE — 25000003 PHARM REV CODE 250: Performed by: NURSE ANESTHETIST, CERTIFIED REGISTERED

## 2018-08-21 PROCEDURE — 0RRK00Z REPLACEMENT OF LEFT SHOULDER JOINT WITH REVERSE BALL AND SOCKET SYNTHETIC SUBSTITUTE, OPEN APPROACH: ICD-10-PCS | Performed by: ORTHOPAEDIC SURGERY

## 2018-08-21 PROCEDURE — 36000711: Performed by: ORTHOPAEDIC SURGERY

## 2018-08-21 PROCEDURE — 23472 RECONSTRUCT SHOULDER JOINT: CPT | Mod: LT,,, | Performed by: ORTHOPAEDIC SURGERY

## 2018-08-21 PROCEDURE — 37000009 HC ANESTHESIA EA ADD 15 MINS: Performed by: ORTHOPAEDIC SURGERY

## 2018-08-21 PROCEDURE — 36000710: Performed by: ORTHOPAEDIC SURGERY

## 2018-08-21 PROCEDURE — 37000008 HC ANESTHESIA 1ST 15 MINUTES: Performed by: ORTHOPAEDIC SURGERY

## 2018-08-21 PROCEDURE — 63600175 PHARM REV CODE 636 W HCPCS: Performed by: NURSE ANESTHETIST, CERTIFIED REGISTERED

## 2018-08-21 PROCEDURE — 27201423 OPTIME MED/SURG SUP & DEVICES STERILE SUPPLY: Performed by: ORTHOPAEDIC SURGERY

## 2018-08-21 PROCEDURE — 71000039 HC RECOVERY, EACH ADD'L HOUR: Performed by: ORTHOPAEDIC SURGERY

## 2018-08-21 PROCEDURE — 71000033 HC RECOVERY, INTIAL HOUR: Performed by: ORTHOPAEDIC SURGERY

## 2018-08-21 PROCEDURE — 64415 NJX AA&/STRD BRCH PLXS IMG: CPT | Performed by: ANESTHESIOLOGY

## 2018-08-21 PROCEDURE — 23472 RECONSTRUCT SHOULDER JOINT: CPT | Mod: 80,LT,, | Performed by: ORTHOPAEDIC SURGERY

## 2018-08-21 PROCEDURE — 11000001 HC ACUTE MED/SURG PRIVATE ROOM

## 2018-08-21 PROCEDURE — 86901 BLOOD TYPING SEROLOGIC RH(D): CPT

## 2018-08-21 PROCEDURE — 23430 REPAIR BICEPS TENDON: CPT | Mod: 80,59,LT, | Performed by: ORTHOPAEDIC SURGERY

## 2018-08-21 PROCEDURE — 25000003 PHARM REV CODE 250: Performed by: PHYSICIAN ASSISTANT

## 2018-08-21 PROCEDURE — 25000003 PHARM REV CODE 250: Performed by: ANESTHESIOLOGY

## 2018-08-21 PROCEDURE — C1776 JOINT DEVICE (IMPLANTABLE): HCPCS | Performed by: ORTHOPAEDIC SURGERY

## 2018-08-21 PROCEDURE — 23430 REPAIR BICEPS TENDON: CPT | Mod: 59,LT,, | Performed by: ORTHOPAEDIC SURGERY

## 2018-08-21 PROCEDURE — 94799 UNLISTED PULMONARY SVC/PX: CPT

## 2018-08-21 PROCEDURE — C1713 ANCHOR/SCREW BN/BN,TIS/BN: HCPCS | Performed by: ORTHOPAEDIC SURGERY

## 2018-08-21 PROCEDURE — 3E0T3BZ INTRODUCTION OF ANESTHETIC AGENT INTO PERIPHERAL NERVES AND PLEXI, PERCUTANEOUS APPROACH: ICD-10-PCS | Performed by: ANESTHESIOLOGY

## 2018-08-21 DEVICE — IMPLANTABLE DEVICE: Type: IMPLANTABLE DEVICE | Site: SHOULDER | Status: FUNCTIONAL

## 2018-08-21 RX ORDER — OXYCODONE AND ACETAMINOPHEN 5; 325 MG/1; MG/1
1 TABLET ORAL EVERY 4 HOURS PRN
Status: DISCONTINUED | OUTPATIENT
Start: 2018-08-21 | End: 2018-08-22

## 2018-08-21 RX ORDER — CLONIDINE HYDROCHLORIDE 0.1 MG/1
0.1 TABLET ORAL 2 TIMES DAILY
Status: DISCONTINUED | OUTPATIENT
Start: 2018-08-21 | End: 2018-08-23 | Stop reason: HOSPADM

## 2018-08-21 RX ORDER — SUCCINYLCHOLINE CHLORIDE 20 MG/ML
INJECTION INTRAMUSCULAR; INTRAVENOUS
Status: DISCONTINUED | OUTPATIENT
Start: 2018-08-21 | End: 2018-08-21

## 2018-08-21 RX ORDER — SODIUM CHLORIDE 0.9 % (FLUSH) 0.9 %
3 SYRINGE (ML) INJECTION
Status: DISCONTINUED | OUTPATIENT
Start: 2018-08-21 | End: 2018-08-23 | Stop reason: HOSPADM

## 2018-08-21 RX ORDER — CEFAZOLIN SODIUM 2 G/50ML
2 SOLUTION INTRAVENOUS
Status: COMPLETED | OUTPATIENT
Start: 2018-08-21 | End: 2018-08-21

## 2018-08-21 RX ORDER — TRANEXAMIC ACID 100 MG/ML
1000 INJECTION, SOLUTION INTRAVENOUS
Status: COMPLETED | OUTPATIENT
Start: 2018-08-21 | End: 2018-08-21

## 2018-08-21 RX ORDER — SODIUM CHLORIDE 0.9 % (FLUSH) 0.9 %
3 SYRINGE (ML) INJECTION EVERY 8 HOURS
Status: DISCONTINUED | OUTPATIENT
Start: 2018-08-21 | End: 2018-08-21 | Stop reason: HOSPADM

## 2018-08-21 RX ORDER — NAPROXEN SODIUM 220 MG/1
81 TABLET, FILM COATED ORAL DAILY
Status: DISCONTINUED | OUTPATIENT
Start: 2018-08-22 | End: 2018-08-23 | Stop reason: HOSPADM

## 2018-08-21 RX ORDER — OXYCODONE AND ACETAMINOPHEN 5; 325 MG/1; MG/1
1 TABLET ORAL
Status: DISCONTINUED | OUTPATIENT
Start: 2018-08-21 | End: 2018-08-21 | Stop reason: HOSPADM

## 2018-08-21 RX ORDER — CEFAZOLIN SODIUM 2 G/50ML
2 SOLUTION INTRAVENOUS
Status: COMPLETED | OUTPATIENT
Start: 2018-08-21 | End: 2018-08-22

## 2018-08-21 RX ORDER — MORPHINE SULFATE 4 MG/ML
2 INJECTION, SOLUTION INTRAMUSCULAR; INTRAVENOUS EVERY 5 MIN PRN
Status: DISCONTINUED | OUTPATIENT
Start: 2018-08-21 | End: 2018-08-21 | Stop reason: HOSPADM

## 2018-08-21 RX ORDER — ACETAMINOPHEN 500 MG
1000 TABLET ORAL EVERY 8 HOURS PRN
Status: DISCONTINUED | OUTPATIENT
Start: 2018-08-21 | End: 2018-08-23 | Stop reason: HOSPADM

## 2018-08-21 RX ORDER — GLYCOPYRROLATE 0.2 MG/ML
INJECTION INTRAMUSCULAR; INTRAVENOUS
Status: DISCONTINUED | OUTPATIENT
Start: 2018-08-21 | End: 2018-08-21

## 2018-08-21 RX ORDER — LIDOCAINE HYDROCHLORIDE 10 MG/ML
INJECTION INFILTRATION; PERINEURAL
Status: DISCONTINUED | OUTPATIENT
Start: 2018-08-21 | End: 2018-08-21

## 2018-08-21 RX ORDER — TRANEXAMIC ACID 100 MG/ML
1000 INJECTION, SOLUTION INTRAVENOUS
Status: DISCONTINUED | OUTPATIENT
Start: 2018-08-21 | End: 2018-08-21

## 2018-08-21 RX ORDER — CHLORHEXIDINE GLUCONATE ORAL RINSE 1.2 MG/ML
10 SOLUTION DENTAL 2 TIMES DAILY
Status: DISCONTINUED | OUTPATIENT
Start: 2018-08-21 | End: 2018-08-23 | Stop reason: HOSPADM

## 2018-08-21 RX ORDER — FENTANYL CITRATE 50 UG/ML
INJECTION, SOLUTION INTRAMUSCULAR; INTRAVENOUS
Status: DISCONTINUED | OUTPATIENT
Start: 2018-08-21 | End: 2018-08-21

## 2018-08-21 RX ORDER — ROCURONIUM BROMIDE 10 MG/ML
INJECTION, SOLUTION INTRAVENOUS
Status: DISCONTINUED | OUTPATIENT
Start: 2018-08-21 | End: 2018-08-21

## 2018-08-21 RX ORDER — ONDANSETRON 2 MG/ML
INJECTION INTRAMUSCULAR; INTRAVENOUS
Status: DISCONTINUED | OUTPATIENT
Start: 2018-08-21 | End: 2018-08-21

## 2018-08-21 RX ORDER — MEPERIDINE HYDROCHLORIDE 50 MG/ML
12.5 INJECTION INTRAMUSCULAR; INTRAVENOUS; SUBCUTANEOUS
Status: DISCONTINUED | OUTPATIENT
Start: 2018-08-21 | End: 2018-08-21 | Stop reason: HOSPADM

## 2018-08-21 RX ORDER — CHLORHEXIDINE GLUCONATE ORAL RINSE 1.2 MG/ML
10 SOLUTION DENTAL
Status: DISCONTINUED | OUTPATIENT
Start: 2018-08-21 | End: 2018-08-21

## 2018-08-21 RX ORDER — ROPIVACAINE HYDROCHLORIDE 5 MG/ML
INJECTION, SOLUTION EPIDURAL; INFILTRATION; PERINEURAL
Status: DISPENSED
Start: 2018-08-21 | End: 2018-08-22

## 2018-08-21 RX ORDER — ONDANSETRON 8 MG/1
8 TABLET, ORALLY DISINTEGRATING ORAL EVERY 8 HOURS PRN
Status: DISCONTINUED | OUTPATIENT
Start: 2018-08-21 | End: 2018-08-23 | Stop reason: HOSPADM

## 2018-08-21 RX ORDER — METOPROLOL SUCCINATE 50 MG/1
50 TABLET, EXTENDED RELEASE ORAL DAILY
Status: DISCONTINUED | OUTPATIENT
Start: 2018-08-21 | End: 2018-08-23 | Stop reason: HOSPADM

## 2018-08-21 RX ORDER — ALPRAZOLAM 0.5 MG/1
0.5 TABLET ORAL NIGHTLY PRN
Status: DISCONTINUED | OUTPATIENT
Start: 2018-08-21 | End: 2018-08-23 | Stop reason: HOSPADM

## 2018-08-21 RX ORDER — SODIUM CHLORIDE 0.9 % (FLUSH) 0.9 %
3 SYRINGE (ML) INJECTION
Status: DISCONTINUED | OUTPATIENT
Start: 2018-08-21 | End: 2024-02-02

## 2018-08-21 RX ORDER — TRAVOPROST OPHTHALMIC SOLUTION 0.04 MG/ML
1 SOLUTION OPHTHALMIC NIGHTLY
Status: DISCONTINUED | OUTPATIENT
Start: 2018-08-21 | End: 2018-08-23 | Stop reason: HOSPADM

## 2018-08-21 RX ORDER — SODIUM CHLORIDE, SODIUM LACTATE, POTASSIUM CHLORIDE, CALCIUM CHLORIDE 600; 310; 30; 20 MG/100ML; MG/100ML; MG/100ML; MG/100ML
INJECTION, SOLUTION INTRAVENOUS CONTINUOUS
Status: DISCONTINUED | OUTPATIENT
Start: 2018-08-21 | End: 2018-08-21

## 2018-08-21 RX ORDER — LOSARTAN POTASSIUM 50 MG/1
50 TABLET ORAL 2 TIMES DAILY
Status: DISCONTINUED | OUTPATIENT
Start: 2018-08-21 | End: 2018-08-23 | Stop reason: HOSPADM

## 2018-08-21 RX ORDER — SODIUM CHLORIDE 9 MG/ML
INJECTION, SOLUTION INTRAVENOUS CONTINUOUS
Status: ACTIVE | OUTPATIENT
Start: 2018-08-21 | End: 2018-08-22

## 2018-08-21 RX ORDER — PROPOFOL 10 MG/ML
VIAL (ML) INTRAVENOUS
Status: DISCONTINUED | OUTPATIENT
Start: 2018-08-21 | End: 2018-08-21

## 2018-08-21 RX ORDER — ONDANSETRON 2 MG/ML
4 INJECTION INTRAMUSCULAR; INTRAVENOUS DAILY PRN
Status: DISCONTINUED | OUTPATIENT
Start: 2018-08-21 | End: 2018-08-21 | Stop reason: HOSPADM

## 2018-08-21 RX ORDER — NEOSTIGMINE METHYLSULFATE 1 MG/ML
INJECTION, SOLUTION INTRAVENOUS
Status: DISCONTINUED | OUTPATIENT
Start: 2018-08-21 | End: 2018-08-21

## 2018-08-21 RX ORDER — ROPIVACAINE HYDROCHLORIDE 5 MG/ML
INJECTION, SOLUTION EPIDURAL; INFILTRATION; PERINEURAL
Status: DISCONTINUED | OUTPATIENT
Start: 2018-08-21 | End: 2018-08-21

## 2018-08-21 RX ADMIN — ROCURONIUM BROMIDE 5 MG: 10 INJECTION, SOLUTION INTRAVENOUS at 09:08

## 2018-08-21 RX ADMIN — FENTANYL CITRATE 25 MCG: 50 INJECTION, SOLUTION INTRAMUSCULAR; INTRAVENOUS at 11:08

## 2018-08-21 RX ADMIN — FENTANYL CITRATE 50 MCG: 50 INJECTION, SOLUTION INTRAMUSCULAR; INTRAVENOUS at 10:08

## 2018-08-21 RX ADMIN — ROCURONIUM BROMIDE 10 MG: 10 INJECTION, SOLUTION INTRAVENOUS at 12:08

## 2018-08-21 RX ADMIN — CHLORHEXIDINE GLUCONATE 10 ML: 1.2 RINSE ORAL at 08:08

## 2018-08-21 RX ADMIN — CEFAZOLIN SODIUM 2 G: 2 SOLUTION INTRAVENOUS at 10:08

## 2018-08-21 RX ADMIN — SODIUM CHLORIDE, SODIUM LACTATE, POTASSIUM CHLORIDE, AND CALCIUM CHLORIDE: 600; 310; 30; 20 INJECTION, SOLUTION INTRAVENOUS at 12:08

## 2018-08-21 RX ADMIN — FENTANYL CITRATE 25 MCG: 50 INJECTION, SOLUTION INTRAMUSCULAR; INTRAVENOUS at 01:08

## 2018-08-21 RX ADMIN — MEPERIDINE HYDROCHLORIDE 12.5 MG: 50 INJECTION INTRAMUSCULAR; INTRAVENOUS; SUBCUTANEOUS at 02:08

## 2018-08-21 RX ADMIN — CEFAZOLIN SODIUM 2 G: 2 SOLUTION INTRAVENOUS at 05:08

## 2018-08-21 RX ADMIN — ROCURONIUM BROMIDE 35 MG: 10 INJECTION, SOLUTION INTRAVENOUS at 09:08

## 2018-08-21 RX ADMIN — ROBINUL 0.6 MG: 0.2 INJECTION INTRAMUSCULAR; INTRAVENOUS at 01:08

## 2018-08-21 RX ADMIN — NEOSTIGMINE METHYLSULFATE 4 MG: 1 INJECTION INTRAVENOUS at 01:08

## 2018-08-21 RX ADMIN — TRAVOPROST 1 DROP: 0.04 SOLUTION/ DROPS OPHTHALMIC at 08:08

## 2018-08-21 RX ADMIN — ROCURONIUM BROMIDE 10 MG: 10 INJECTION, SOLUTION INTRAVENOUS at 11:08

## 2018-08-21 RX ADMIN — LIDOCAINE HYDROCHLORIDE 50 MG: 10 INJECTION, SOLUTION INFILTRATION; PERINEURAL at 09:08

## 2018-08-21 RX ADMIN — TRANEXAMIC ACID 1000 MG: 100 INJECTION, SOLUTION INTRAVENOUS at 08:08

## 2018-08-21 RX ADMIN — ONDANSETRON 4 MG: 2 INJECTION, SOLUTION INTRAMUSCULAR; INTRAVENOUS at 01:08

## 2018-08-21 RX ADMIN — LOSARTAN POTASSIUM 50 MG: 50 TABLET, FILM COATED ORAL at 08:08

## 2018-08-21 RX ADMIN — SODIUM CHLORIDE, SODIUM LACTATE, POTASSIUM CHLORIDE, AND CALCIUM CHLORIDE: 600; 310; 30; 20 INJECTION, SOLUTION INTRAVENOUS at 08:08

## 2018-08-21 RX ADMIN — SUCCINYLCHOLINE CHLORIDE 100 MG: 20 INJECTION, SOLUTION INTRAMUSCULAR; INTRAVENOUS at 09:08

## 2018-08-21 RX ADMIN — OXYCODONE HYDROCHLORIDE AND ACETAMINOPHEN 1 TABLET: 5; 325 TABLET ORAL at 09:08

## 2018-08-21 RX ADMIN — PROPOFOL 140 MG: 10 INJECTION, EMULSION INTRAVENOUS at 09:08

## 2018-08-21 RX ADMIN — ROPIVACAINE HYDROCHLORIDE 20 ML: 5 INJECTION, SOLUTION EPIDURAL; INFILTRATION; PERINEURAL at 03:08

## 2018-08-21 NOTE — SUBJECTIVE & OBJECTIVE
Past Medical History:   Diagnosis Date    Anxiety 11/28/2017    Arthritis     Back pain     Basal cell carcinoma 03/29/2018    left mid back    Colon polyps     reported per patient.     Fuchs' corneal dystrophy     General anesthetics causing adverse effect in therapeutic use     woke up during surgery and gagged on ET tube    Glaucoma     Heart failure with preserved left ventricular function (HFpEF) 7/24/2018    Hyperlipidemia     Hypertension     Macular degeneration dry    Obesity     Trouble in sleeping     Urinary tract infection        Past Surgical History:   Procedure Laterality Date    ADENOIDECTOMY  1938    BREAST BIOPSY Left     1997. benign    BREAST CYST EXCISION Left 1997 approx    CARPAL TUNNEL RELEASE Right 2012 approx    CATARACT EXTRACTION Bilateral 1994    CHOLECYSTECTOMY  1975    CORNEAL TRANSPLANT Bilateral 08/2015 8/2015 - left; Right 4/2016    EYE SURGERY      Fuch's Corneal dystrophy - had 2nd operation with Dr. Quintanilla    EYE SURGERY      Cataract wIOL    left thumb Left 2011    removed cuboid for arthritis    SLT- OS (aka TRABECULOPLASTY) Left 05/11/2011    repeat 3/14/12    TONSILLECTOMY  1938       Review of patient's allergies indicates:   Allergen Reactions    Claritin [loratadine] Other (See Comments)     Double vision/spots    Hydrocodone-acetaminophen      wheezing    Naproxen      wheezing    Verapamil Diarrhea    Vibramycin [doxycycline calcium] Other (See Comments)     Weakness nausea   sob    Amlodipine      Myalgias      Coreg [carvedilol] Swelling     lips    Fenofibrate      Causes muscle weakness    Hydralazine analogues      Muscle tremors/aches      Isosorbide     Lasix [furosemide]      myalgias    Moxifloxacin Other (See Comments)     Hives   muscle soreness    Timolol     Adhesive Rash     Clonidine patch - 2 mfg - contact dermatitis    Allegra [fexofenadine] Other (See Comments)     Double vision/spots     Codeine Diarrhea  and Other (See Comments)     Loss of balance     Erythromycin Other (See Comments)     Complete weakness/could not walk    Hydrocortisone (bulk) Other (See Comments)     Only suppository/ caused muscle weakness    Macrolide antibiotics Other (See Comments)     Complete weakness/could not walk    Patanol [olopatadine] Other (See Comments)     Muscle weakness    Prednisone Anxiety    Statins-hmg-coa reductase inhibitors Other (See Comments)     Muscle weakness    Xalatan [latanoprost] Other (See Comments)     Muscle weakness       No current facility-administered medications on file prior to encounter.      Current Outpatient Medications on File Prior to Encounter   Medication Sig    ALPHA LIPOIC ACID ORAL Take 200 mg by mouth.     ALPRAZolam (XANAX) 0.5 MG tablet TAKE 1/2 - 1 TABLET BY MOUTH NIGHTLY FOR SLEEP OR ANXIETY    cloNIDine (CATAPRES) 0.1 MG tablet Take 1 tablet (0.1 mg total) by mouth 2 (two) times daily.    coenzyme Q10 200 mg capsule Take 400 mg by mouth once daily.     fish oil-omega-3 fatty acids 300-1,000 mg capsule Take 2 g by mouth once daily.    losartan (COZAAR) 50 MG tablet Take 1 tablet (50 mg total) by mouth 2 (two) times daily.    LOTEMAX 0.5 % DrpG     metoprolol succinate (TOPROL-XL) 25 MG 24 hr tablet Take 2 tablets (50 mg total) by mouth once daily. (Patient taking differently: Take 50 mg by mouth once daily. )    POLYETHYLENE GLYCOL 3350 (MIRALAX ORAL) Take by mouth as needed.     pyridoxine, vitamin B6, (B-6) 100 MG Tab Take 100 mg by mouth 2 (two) times daily.     triamcinolone acetonide 0.1% (KENALOG) 0.1 % cream Apply topically 2 (two) times daily. For up to one week as needed    triamterene-hydrochlorothiazide 37.5-25 mg (DYAZIDE) 37.5-25 mg per capsule 1 capsule once daily.     TURMERIC ROOT EXTRACT ORAL Take 665 mg by mouth.     acetaminophen (TYLENOL) 500 MG tablet Take 500 mg by mouth once daily at 6am. Taking 1 to 3    B complex-vitamin C-folic acid 0.8 mg  Tab Take 1 tablet by mouth once daily.    Boswellia lashawn extract (BOSWELLIA LASAHWN XT, BULK, MISC) by Misc.(Non-Drug; Combo Route) route.    CALCIUM CITRATE ORAL Take by mouth.    cholecalciferol, vitamin D3, (VITAMIN D3) 2,000 unit Cap Take 1 capsule by mouth once daily.     Family History     Problem Relation (Age of Onset)    Cancer Father (88)    Heart disease Mother    Hypertension Mother        Tobacco Use    Smoking status: Never Smoker    Smokeless tobacco: Never Used    Tobacco comment: history of passive smoking from her ex-   Substance and Sexual Activity    Alcohol use: Yes     Alcohol/week: 1.2 oz     Types: 2 Standard drinks or equivalent per week     Comment: occasional     Drug use: No    Sexual activity: Not Currently     Partners: Male     Birth control/protection: Post-menopausal     Comment: discussed protection for STD's     Review of Systems   Constitutional: Negative for appetite change, chills, diaphoresis, fatigue and fever.   HENT: Negative for congestion, ear pain, mouth sores, sore throat and trouble swallowing.    Eyes: Negative for pain and visual disturbance.   Respiratory: Negative for cough, chest tightness and shortness of breath.    Cardiovascular: Negative for chest pain, palpitations and leg swelling.   Gastrointestinal: Negative for abdominal pain, constipation and nausea.   Endocrine: Negative for cold intolerance, heat intolerance, polydipsia and polyuria.   Genitourinary: Negative for dysuria, frequency and hematuria.   Musculoskeletal: Positive for arthralgias (bilateral shoulder). Negative for back pain, myalgias and neck pain.   Skin: Negative for pallor, rash and wound.   Allergic/Immunologic: Negative for environmental allergies and immunocompromised state.   Neurological: Negative for dizziness, seizures, syncope, weakness, numbness and headaches.   Hematological: Negative for adenopathy. Does not bruise/bleed easily.   Psychiatric/Behavioral:  Negative for agitation, confusion and sleep disturbance.     Objective:     Vital Signs (Most Recent):  Temp: 97.6 °F (36.4 °C) (08/21/18 1530)  Pulse: 85 (08/21/18 1530)  Resp: 16 (08/21/18 1530)  BP: (!) 152/70 (08/21/18 1530)  SpO2: (!) 92 % (08/21/18 1530) Vital Signs (24h Range):  Temp:  [97.5 °F (36.4 °C)-98.8 °F (37.1 °C)] 97.6 °F (36.4 °C)  Pulse:  [70-85] 85  Resp:  [15-20] 16  SpO2:  [92 %-100 %] 92 %  BP: (140-183)/(62-78) 152/70     Weight: 76 kg (167 lb 8.8 oz)  Body mass index is 28.76 kg/m².    Physical Exam   Constitutional: She is oriented to person, place, and time. She appears well-developed and well-nourished. No distress.   HENT:   Head: Normocephalic and atraumatic.   Eyes: Conjunctivae are normal.   PERRL   Neck: Neck supple. No JVD present.   Cardiovascular: Normal rate, regular rhythm and normal heart sounds.   Pulmonary/Chest: Effort normal and breath sounds normal.   Abdominal: Soft. Bowel sounds are normal. She exhibits no distension. There is no tenderness.   Musculoskeletal:        Left shoulder: She exhibits decreased range of motion, tenderness and swelling.   Left upper extremity sling in place.    Neurological: She is alert and oriented to person, place, and time.   Skin: Skin is warm and dry.   Psychiatric: She has a normal mood and affect. Her behavior is normal. Thought content normal.   Nursing note and vitals reviewed.          Significant Labs: All pertinent labs within the past 24 hours have been reviewed.    Significant Imaging: I have reviewed all pertinent imaging results/findings within the past 24 hours.

## 2018-08-21 NOTE — ANESTHESIA RELEASE NOTE
"Anesthesia Release from PACU Note    Patient: Shirley Metz    Procedure(s) Performed: Procedure(s) (LRB):  ARTHROPLASTY, SHOULDER, TOTAL, REVERSE (Left)    Anesthesia type: general    Post pain: Adequate analgesia    Post assessment: no apparent anesthetic complications, tolerated procedure well and no evidence of recall    Last Vitals:   Visit Vitals  BP (!) 144/67 (BP Location: Right arm, Patient Position: Sitting)   Pulse 70   Temp 36.4 °C (97.5 °F) (Temporal)   Resp 18   Ht 5' 4" (1.626 m)   Wt 76 kg (167 lb 8.8 oz)   SpO2 97%   Breastfeeding? No   BMI 28.76 kg/m²       Post vital signs: stable    Level of consciousness: responds to stimulation    Nausea/Vomiting: no nausea/no vomiting    Complications: none    Airway Patency: patent    Respiratory: unassisted    Cardiovascular: stable and blood pressure at baseline    Hydration: euvolemic     "

## 2018-08-21 NOTE — OP NOTE
Operative Note       Surgery Date: 8/21/2018     Surgeon(s) and Role:     * Solomon Lujan MD - Primary     * Alexandru Walker MD - Assisting    The use of Dr. Walker as an assistant surgeon was medically necessary for patient positioning, skin retraction, closure and assistance with this procedure. The procedure could not be performed properly without the use of Dr. Walker as an assisting surgeon. There was no qualified resident/fellow available for assistance with this procedure.       Pre-op Diagnosis:      Primary osteoarthritis of left shoulder [M19.012]  Rotator cuff tear left shoulder  Cyst of humeral head and metaphysis left humerus  Left shoulder biceps tendonitis    Post-op Diagnosis: Same    Procedure(s) (LRB):    1. Left reverse total shoulder arthroplasty  2. Left shoulder open biceps soft tissue tenodesis    Anesthesia: General    Estimated Blood Loss: 50 cc           Specimens: None    Implants:       Cementless metaglene  36,30, 18 screws  38 glenosphere  38+9 spacer  Size 1 left epi  12 stemp      Implant Name Type Inv. Item Serial No.  Lot No. LRB No. Used   KIT PIN STEINMANN PFC .[id number] - [id number]  KIT PIN STEINMANN PFC .[id number]  [employer] [id number] Left 1   DELTAXTEND locking metaglene screw    [employer] [id number] Left 1   DELTA XTEND cementless metaglene    [employer] [id number] Left 1   DELTA XTEND locking metaglene screw    [employer] [id number] Left 1   DELTA XTEND nonlocking metaglene screw     [id number] Left 1   DELTA XTEND Glenosphere    [employer] [id number] Left 1   DELTA XTEND STANDARD HUMERAL PE CUP    [employer] [id number] Left 1   DELTA XTEND MODULAR ECCENTRIC EPIPHYSIS    [employer] [id number] Left 1   DELTA XTEND MODULAR HUMERAL STEM    [employer] [id number] Left 1   DELTA XTEND nonlocking metaglene screw    [employer] [id number] Left 1   PIN GUIDE DELTA [id number] - [id number]  PIN GUIDE DELTA [id number]  [employer] [id number] Left 1   1.5MM GUIDE PIN     [id number] Left 1        OPERATIVE INDICATIONS: Shirley Metz is a 85 y.o. female with progressive pain in her left shoulder, diagnosed with severe glenohumeral osteoarthritis and biceps tendinitis with humeral head cyst involving and extending towards the insertion of the rotator cuff insertion. Preoperative imaging showed partial thickness tearing of the supraspinatus tendon and mild posterior wear to glenoid. She failed non-operative treatment and elected operative intervention to include a left total shoulder replacement vs reverse shoulder arthroplasty and biceps tenodesis. All risks and benefits have been discussed, including dislocation, infection, bleeding, scarring, need for additional surgery and damage to neurovascular structures, risks of anesthesia stroke, heart attack and  and she elected to proceed. No guarantees given nor implied.     INTRAOPERATIVE FINDINGS:  1. Severe glenohumeral osteoarthritis  2. High grade biceps tendinopathy  3. Humeral head cyst extending to the greater tuberosity and rotator cuff insertion    APPROACH: Deltopectoral with subscapularis tenotomy.      PROCEDURE IN DETAIL: The patient was brought in the room.  After undergoing general anesthesia, she was placed in a well-padded beach chair positioner. The left upper extremity was prepped and draped in usual sterile fashion including the use of Barrier Ioban devices and space suits, which were worn during the case.      Perioperative antibiotics including ancef was given during the case, prior to the incision and redosed if appropriate.     A 10 cm deltopectoral incision was made from the tip of the coracoid down to the insertion of the deltoid. After going through skin and subcutaneous tissues, excellent hemostasis was achieved. Blunt dissection was taken down to the deltopectoral interval.     The soft tissue lateral to the conjoined tendon condyle of the clavipectoral fascia was cleaned off.      BICEPS TENODESIS:  The biceps tendon  was identified and tenotomized with #2 fiberwire to pectoralis major tendon before releasing it within the groove to ensure the correct length-tension relationship was maintained. The proximal remnant was removed and discarded     The subscapularis was identified. The large three sisters were ligated. A subscapularis tenotomy was planned. Then #2 Fiber wire sutures were placed as tagging sutures into the subscapularis.      Subscapularis tenotomy 1 - 1.5 cm from its insertion was performed. Joint fluid was evacuated.  The soft tissue on the neck of the humerus was cleaned off with the Bovie device. A retractor was placed inferiorly to protect the axillary nerve, and the axillary nerve was protected during the entire portion of the case. Subscapularis had good excursion and did not need formal release other than finger dissection, was freely mobile. The humeral head was dislocated anteriorly with external rotation. Humeral head osteophytes were removed.   The overlying supraspinatus was protected during the case and overall looked to be intact, but humeral head cyst extended to the tuberosity with thin cortical bone here. Because of the risk for perioperative fracture of the greater tuberosity and potential loss of the rotator cuff insertion / attachment, which could compromise the results of a conventional total shoulder arthroplasty, decision was made to proceed with reverse total shoulder arthroplasty, felt appropriate given patients age.     Attention was turned to the humerus first. A starting reamer, followed by sequential reamers up to a size 12, were inserted. Once this was in place, the neck cut intermedullary jig was applied. A 10 degree retroverted cut was planned, which was in line with the normal anatomy. Rotator cuff insertion was protected, but as previously stated due to destructive cyst the rotator cuff insertion was not felt to be at potential long term risk for displacement. The cut was  "provisionally done, once the guide had been pinned in place. The reamer was removed, and the neck cut was completed. All excess bony osteophytes were removed. A metal disk was put in place to protect the neck cut during glenoid exposure. Rotator cuff protected and intact after cut.     Retractors were taken out and the glenoid was exposed. Multiple forked glenoid retractors were used for a complete exposure. The remnant in the biceps was removed. The labrum was removed in 360 degrees. Remaining osteophytes were removed. Inferior release from 3 o clock to 9 oclock was performed. Template was placed at the inferior margin of the glenoid to ensure the glenosphere would eventually be inferiorized and avoid / minimize notching. Guidewire was inserted. This was judged to be in a correct location. The reamer, followed by peripheral reamer, was used and a rim of bone was removed. The central reamer was planned and performed. The trial was placed and 38 felt to best reproduce the patient's anatomy. The metaglene was placed and three screws placed all with excellent purchase with more than "2 hand" tight feedback. Inferior screw was housed within bone and measured 38 mm thus 36 mm screw placed. Superior screw was 30 mm, posterior screw was 18 mm. The anterior screw hole was measured at 14 mm and thus left free.     After pulsatile lavage, the glenosphere was placed. This was impacted into position and screwed to 2 hand tight. The glenosphere stability was checked with freer and was stable.     The broach was used and sequentially up to a size 12, which was judged to be the correct size.  A size 38 + 6 trial was reduced and showed excellent ROM with forward flexion, adduction, external rotation with abduction, and internal rotation with abduction. Good stability for all of the above and with subscapularis held over, good stability with external rotation with abduction. With the arm in neutral position, there was no "shuck" - " "deltoid felt appropriately tensioned but not over tensioned, and with axial traction there was no separation of the components, the entire shoulder moved before 1 mm of separation of the components.       The trial broach was taken out, and the permanent size 12 stem and 38 cup + 9 was selected to upsize one size from trial to implant placed and was put in good position. The cup was impacted into place and checked and was stable on the stem.  There was excellent range of motion as noted above with no evidence of impingement or instability after final implants were in place. With the arm in neutral position, there was no "shuck" - deltoid felt appropriately tensioned but not overtensioned, and with axial traction there was no separation of the components, the entire shoulder moved before 1 mm of separation of the components. With external rotation and abduction there was only 2 mm of separation of the components with good stability.        The wound was copiously irrigated with a normal saline lavage. Subscapularis was not over tensioned when trial reduced and thus was repaired with #2 Fiber wire. Strong repair here was achieved. After final repair, full range of motion was achieved. The deltopectoral interval was reapproximated with #0 Vicryl. Skin was closed with 0 Vicryl, 2-0 Vicryl and staples. Wound covered with Xeroform, 4 x 4 fluffs and foam tape. The patient was placed in a sling for protection, was extubated in the room and transferred to the Recovery Room in stable condition.     There were no complications in the case.     Postop plan: NWB LUE in sling for 2 weeks  No ER past neutral  Pain control  24 hours abx                   "

## 2018-08-21 NOTE — PLAN OF CARE
Patient prepared for surgery. Son at bedside. Blood delivered to lab for type and screen. Tranexamic acid administered per order.

## 2018-08-21 NOTE — ANESTHESIA PROCEDURE NOTES
Peripheral    Patient location during procedure: post-op   Block not for primary anesthetic.  Reason for block: at surgeon's request and post-op pain management   Post-op Pain Location: left shoulder  Start time: 8/21/2018 2:27 PM  Timeout: 8/21/2018 2:25 PM   End time: 8/21/2018 2:34 PM  Staffing  Anesthesiologist: Basilia Winter MD  Preanesthetic Checklist  Completed: patient identified, site marked, surgical consent, pre-op evaluation, timeout performed, IV checked, risks and benefits discussed and monitors and equipment checked  Peripheral Block  Patient position: sitting  Prep: ChloraPrep  Patient monitoring: heart rate, cardiac monitor, continuous pulse ox, continuous capnometry and frequent blood pressure checks  Block type: interscalene  Laterality: left  Injection technique: single shot  Needle  Needle type: Stimuplex   Needle gauge: 24.  Needle length: 1in.  Needle localization: ultrasound guidance and nerve stimulator     Assessment  Injection assessment: negative aspiration, negative parasthesia and local visualized surrounding nerve  Paresthesia pain: none  Heart rate change: no  Slow fractionated injection: yes  Additional Notes  VSS.  [facility] RN monitoring vitals throughout procedure.  Patient tolerated procedure well.

## 2018-08-21 NOTE — ANESTHESIA POSTPROCEDURE EVALUATION
"Anesthesia Post Evaluation    Patient: Shirley Metz    Procedure(s) Performed: Procedure(s) (LRB):  ARTHROPLASTY, SHOULDER, TOTAL, REVERSE (Left)    Final Anesthesia Type: general  Patient location during evaluation: PACU  Patient participation: Yes- Able to Participate  Level of consciousness: awake and alert  Post-procedure vital signs: reviewed and stable  Pain management: adequate  Airway patency: patent  PONV status at discharge: No PONV  Anesthetic complications: no      Cardiovascular status: hemodynamically stable  Respiratory status: spontaneous ventilation  Hydration status: euvolemic  Follow-up not needed.        Visit Vitals  BP (!) 140/77   Pulse 75   Temp 37.1 °C (98.8 °F) (Axillary)   Resp 20   Ht 5' 4" (1.626 m)   Wt 76 kg (167 lb 8.8 oz)   SpO2 95%   Breastfeeding? No   BMI 28.76 kg/m²       Pain/Beata Score: Pain Assessment Performed: Yes (8/21/2018  3:00 PM)  Presence of Pain: complains of pain/discomfort (8/21/2018  3:00 PM)  Pain Rating Prior to Med Admin: 8 (8/21/2018  2:09 PM)  Beata Score: 10 (8/21/2018  3:00 PM)        "

## 2018-08-21 NOTE — HPI
Shirley Metz is an 85 year old female with CHF, hypertension and hyperlipidemia who presented for elective left total reverse shoulder arthroplasty performed by Dr. Lujan today. The patient tolerated the procedure well. She denies uncontrolled shoulder pain. Prior to surgery, the patient reports feeling well. She denies fever, cough, SOB and lower extremity edema. Code status was discussed with the patient. She is a full code. Her son, Boris Bettencourt is her medical power of .

## 2018-08-21 NOTE — PLAN OF CARE
Problem: Patient Care Overview  Goal: Plan of Care Review  Outcome: Ongoing (interventions implemented as appropriate)  Received patient from PACU, tolerating well at this time. Complains of numbness in left hand. Sling adjusted and in place. Dressing clean, dry, and intact. Patient due to void. Will continue to monitor. 12 hour chart check.

## 2018-08-21 NOTE — ANESTHESIA PREPROCEDURE EVALUATION
08/20/2018  Shirley Metz is a 85 y.o., female.    Anesthesia Evaluation    I have reviewed the Patient Summary Reports.    I have reviewed the Nursing Notes.      Review of Systems  Anesthesia Hx:  Denies Hx of Anesthetic complications    Social:  Non-Smoker    Hematology/Oncology:  Hematology Normal   Oncology Normal     Cardiovascular:   Hypertension ECG has been reviewed.    HPI  The patient came in today for cardiac consult of Hypertension (pre-op) and Hyperlipidemia     Ms. Flower is an 85 year old female patient with HTN, hyperlipidemia, palpitations presents today for follow-up.     5/1/18  She has significant side effects from many BP meds, could not tolerate Verapamil recently. BP has improved, but sometimes BP gets too low - occasionally 119/53.   She thinks her BP is too low under 130 systolic and wants to decrease some meds. Discussed we can half the clonidine patch and monitor. Will continue other meds for now.  She has a good log with BPs daily. Overall BP is well controlled now. Tries to eat low salt diet for the most part but is at Nursing home and can't always control the diet.   Other main issue is her arm pain due to shoulder pain will see pain management specialist Dr. Chin.      5/24/18  Has a lot of pain in both arms, will be seeing Dr. Chin and then may need surgery by Dr. Lujan. Reviewed BP log - mostly 130s-140s, once 96/58. Occasional palpitations - usually with waking up or sleeping.      6/11/18  Pt has been getting painful rash at site of clonidine. Also has an infection possibly at left shoulder where procedure happened for shoulders. She is also seeing surgery today for shoulder pain. Pt also feels very weak due to clonidine and has trouble getting up with 0.2 mg patch. Last night BP went up to 190/71 and took a clonidine .1 mg PO dose which improved BP.  Bp mildly elevated today but also in a lot of pain.      7/24/18  Pt is scheduled for shoulder surgery on shoulder Aug 21st. She has been getting accupuncture which has helped to walk. She has stopped clonidine patch is taking pills BID/TID. BP overall have been well controlled, 140-150s/50-60s. Otherwise feels ok.      Patient feels no chest pain, no sob, no leg swelling, no PND,  no dizziness, no syncope, no CNS symptoms.     Patient is compliant with medications.     2D ECHO  CONCLUSIONS     1 - Concentric hypertrophy.     2 - No wall motion abnormalities.     3 - Normal left ventricular systolic function (EF 55-60%).     4 - Normal left ventricular diastolic function.     5 - Normal right ventricular systolic function .     6 - Mild tricuspid regurgitation.     5. Pre-OP CV evaluation prior to shoulder surgery  Low periop risk of CV events for moderate risk procedure.  Ok to proceed to the scheduled surgery without further cardiac study.  Continue Metoprolol periop.      Pulmonary:  Pulmonary Normal    Renal/:   Chronic Renal Disease, CRI    Hepatic/GI:  Hepatic/GI Normal    Neurological:   Denies Neuromuscular Disease.    Psych:   Psychiatric History anxiety          Physical Exam  General:  Well nourished    Airway/Jaw/Neck:  Airway Findings: Mouth Opening: Normal General Airway Assessment: Adult  Mallampati: II  TM Distance: 4 - 6 cm       Chest/Lungs:  Chest/Lungs Findings: Clear to auscultation     Heart/Vascular:  Heart Findings: Rate: Normal  Rhythm: Regular Rhythm        Mental Status:  Mental Status Findings:  Cooperative, Alert and Oriented         Anesthesia Plan  Type of Anesthesia, risks & benefits discussed:  Anesthesia Type:  general  Patient's Preference:   Intra-op Monitoring Plan:   Intra-op Monitoring Plan Comments:   Post Op Pain Control Plan: peripheral nerve block  Post Op Pain Control Plan Comments:   Induction:   IV  Beta Blocker:    Patient is on a Beta-Blocker and is not required to  be redosed perioperatively for the following medical conditions:   Beta Blocker Comments: Patient states she takes beta blocker on a prn basis and does not meet criteria this AM for dose.  She also relates a history of labile BP.  Will treat intraop if needed.  Informed Consent:    ASA Score: 3     Day of Surgery Review of History & Physical:            Ready For Surgery From Anesthesia Perspective.

## 2018-08-21 NOTE — H&P
[facility] Hospital Medicine  History & Physical    Patient Name: Shirley Metz  MRN: [id number]  Admission Date: 8/21/2018  Attending Physician: Solomon Lujan MD   Primary Care Provider: Yandy Terrell MD         Patient information was obtained from patient, past medical records and ER records.     Subjective:     Principal Problem:Arthritis of left shoulder region    Chief Complaint:   Chief Complaint   Patient presents with    Shoulder Pain        HPI: Shirley Metz is an 85 year old female with CHF, hypertension and hyperlipidemia who presented for elective left total reverse shoulder arthroplasty performed by Dr. Lujan today. The patient tolerated the procedure well. She denies uncontrolled shoulder pain. Prior to surgery, the patient reports feeling well. She denies fever, cough, SOB and lower extremity edema. Code status was discussed with the patient. She is a full code. Her son, Boris Bettencourt is her medical power of .     Past Medical History:   Diagnosis Date    Anxiety 11/28/2017    Arthritis     Back pain     Basal cell carcinoma 03/29/2018    left mid back    Colon polyps     reported per patient.     Fuchs' corneal dystrophy     General anesthetics causing adverse effect in therapeutic use     woke up during surgery and gagged on ET tube    Glaucoma     Heart failure with preserved left ventricular function (HFpEF) 7/24/2018    Hyperlipidemia     Hypertension     Macular degeneration dry    Obesity     Trouble in sleeping     Urinary tract infection        Past Surgical History:   Procedure Laterality Date    ADENOIDECTOMY  1938    BREAST BIOPSY Left     1997. benign    BREAST CYST EXCISION Left 1997 approx    CARPAL TUNNEL RELEASE Right 2012 approx    CATARACT EXTRACTION Bilateral 1994    CHOLECYSTECTOMY  1975    CORNEAL TRANSPLANT Bilateral 08/2015 8/2015 - left; Right 4/2016    EYE SURGERY      Fuch's Corneal dystrophy -  had 2nd operation with Dr. Quintanilla    EYE SURGERY      Cataract wIOL    left thumb Left 2011    removed cuboid for arthritis    SLT- OS (aka TRABECULOPLASTY) Left 05/11/2011    repeat 3/14/12    TONSILLECTOMY  1938       Review of patient's allergies indicates:   Allergen Reactions    Claritin [loratadine] Other (See Comments)     Double vision/spots    Hydrocodone-acetaminophen      wheezing    Naproxen      wheezing    Verapamil Diarrhea    Vibramycin [doxycycline calcium] Other (See Comments)     Weakness nausea   sob    Amlodipine      Myalgias      Coreg [carvedilol] Swelling     lips    Fenofibrate      Causes muscle weakness    Hydralazine analogues      Muscle tremors/aches      Isosorbide     Lasix [furosemide]      myalgias    Moxifloxacin Other (See Comments)     Hives   muscle soreness    Timolol     Adhesive Rash     Clonidine patch - 2 mfg - contact dermatitis    Allegra [fexofenadine] Other (See Comments)     Double vision/spots     Codeine Diarrhea and Other (See Comments)     Loss of balance     Erythromycin Other (See Comments)     Complete weakness/could not walk    Hydrocortisone (bulk) Other (See Comments)     Only suppository/ caused muscle weakness    Macrolide antibiotics Other (See Comments)     Complete weakness/could not walk    Patanol [olopatadine] Other (See Comments)     Muscle weakness    Prednisone Anxiety    Statins-hmg-coa reductase inhibitors Other (See Comments)     Muscle weakness    Xalatan [latanoprost] Other (See Comments)     Muscle weakness       No current facility-administered medications on file prior to encounter.      Current Outpatient Medications on File Prior to Encounter   Medication Sig    ALPHA LIPOIC ACID ORAL Take 200 mg by mouth.     ALPRAZolam (XANAX) 0.5 MG tablet TAKE 1/2 - 1 TABLET BY MOUTH NIGHTLY FOR SLEEP OR ANXIETY    cloNIDine (CATAPRES) 0.1 MG tablet Take 1 tablet (0.1 mg total) by mouth 2 (two) times daily.    coenzyme  Q10 200 mg capsule Take 400 mg by mouth once daily.     fish oil-omega-3 fatty acids 300-1,000 mg capsule Take 2 g by mouth once daily.    losartan (COZAAR) 50 MG tablet Take 1 tablet (50 mg total) by mouth 2 (two) times daily.    LOTEMAX 0.5 % DrpG     metoprolol succinate (TOPROL-XL) 25 MG 24 hr tablet Take 2 tablets (50 mg total) by mouth once daily. (Patient taking differently: Take 50 mg by mouth once daily. )    POLYETHYLENE GLYCOL 3350 (MIRALAX ORAL) Take by mouth as needed.     pyridoxine, vitamin B6, (B-6) 100 MG Tab Take 100 mg by mouth 2 (two) times daily.     triamcinolone acetonide 0.1% (KENALOG) 0.1 % cream Apply topically 2 (two) times daily. For up to one week as needed    triamterene-hydrochlorothiazide 37.5-25 mg (DYAZIDE) 37.5-25 mg per capsule 1 capsule once daily.     TURMERIC ROOT EXTRACT ORAL Take 665 mg by mouth.     acetaminophen (TYLENOL) 500 MG tablet Take 500 mg by mouth once daily at 6am. Taking 1 to 3    B complex-vitamin C-folic acid 0.8 mg Tab Take 1 tablet by mouth once daily.    Boswellia lashawn extract (BOSWELLIA LASHAWN XT, BULK, MISC) by Misc.(Non-Drug; Combo Route) route.    CALCIUM CITRATE ORAL Take by mouth.    cholecalciferol, vitamin D3, (VITAMIN D3) 2,000 unit Cap Take 1 capsule by mouth once daily.     Family History     Problem Relation (Age of Onset)    Cancer Father (88)    Heart disease Mother    Hypertension Mother        Tobacco Use    Smoking status: Never Smoker    Smokeless tobacco: Never Used    Tobacco comment: history of passive smoking from her ex-   Substance and Sexual Activity    Alcohol use: Yes     Alcohol/week: 1.2 oz     Types: 2 Standard drinks or equivalent per week     Comment: occasional     Drug use: No    Sexual activity: Not Currently     Partners: Male     Birth control/protection: Post-menopausal     Comment: discussed protection for STD's     Review of Systems   Constitutional: Negative for appetite change, chills,  diaphoresis, fatigue and fever.   HENT: Negative for congestion, ear pain, mouth sores, sore throat and trouble swallowing.    Eyes: Negative for pain and visual disturbance.   Respiratory: Negative for cough, chest tightness and shortness of breath.    Cardiovascular: Negative for chest pain, palpitations and leg swelling.   Gastrointestinal: Negative for abdominal pain, constipation and nausea.   Endocrine: Negative for cold intolerance, heat intolerance, polydipsia and polyuria.   Genitourinary: Negative for dysuria, frequency and hematuria.   Musculoskeletal: Positive for arthralgias (bilateral shoulder). Negative for back pain, myalgias and neck pain.   Skin: Negative for pallor, rash and wound.   Allergic/Immunologic: Negative for environmental allergies and immunocompromised state.   Neurological: Negative for dizziness, seizures, syncope, weakness, numbness and headaches.   Hematological: Negative for adenopathy. Does not bruise/bleed easily.   Psychiatric/Behavioral: Negative for agitation, confusion and sleep disturbance.     Objective:     Vital Signs (Most Recent):  Temp: 97.6 °F (36.4 °C) (08/21/18 1530)  Pulse: 85 (08/21/18 1530)  Resp: 16 (08/21/18 1530)  BP: (!) 152/70 (08/21/18 1530)  SpO2: (!) 92 % (08/21/18 1530) Vital Signs (24h Range):  Temp:  [97.5 °F (36.4 °C)-98.8 °F (37.1 °C)] 97.6 °F (36.4 °C)  Pulse:  [70-85] 85  Resp:  [15-20] 16  SpO2:  [92 %-100 %] 92 %  BP: (140-183)/(62-78) 152/70     Weight: 76 kg (167 lb 8.8 oz)  Body mass index is 28.76 kg/m².    Physical Exam   Constitutional: She is oriented to person, place, and time. She appears well-developed and well-nourished. No distress.   HENT:   Head: Normocephalic and atraumatic.   Eyes: Conjunctivae are normal.   PERRL   Neck: Neck supple. No JVD present.   Cardiovascular: Normal rate, regular rhythm and normal heart sounds.   Pulmonary/Chest: Effort normal and breath sounds normal.   Abdominal: Soft. Bowel sounds are normal. She  exhibits no distension. There is no tenderness.   Musculoskeletal:        Left shoulder: She exhibits decreased range of motion, tenderness and swelling.   Left upper extremity sling in place.    Neurological: She is alert and oriented to person, place, and time.   Skin: Skin is warm and dry.   Psychiatric: She has a normal mood and affect. Her behavior is normal. Thought content normal.   Nursing note and vitals reviewed.          Significant Labs: All pertinent labs within the past 24 hours have been reviewed.    Significant Imaging: I have reviewed all pertinent imaging results/findings within the past 24 hours.           Assessment/Plan:     * Arthritis of left shoulder region    -S/P reverse total arthroplasty.   -Management per Orthopedics.           Heart failure with preserved left ventricular function (HFpEF)    -Chronic and appears compensated.   -Monitor volume status.   -Continue metoprolol.           HTN (hypertension)    Continue Dyazide, clonidine and losartan.          Anxiety    Continue Xanax as needed.             VTE Risk Mitigation (From admission, onward)        Ordered     IP VTE LOW RISK PATIENT  Once      08/21/18 0725     Place sequential compression device  Until discontinued      08/21/18 0725             JERADRO Beatty  Department of Hospital Medicine   [facility]

## 2018-08-21 NOTE — INTERVAL H&P NOTE
The patient has been examined and the H&P has been reviewed:    I concur with the findings and no changes have occurred since H&P was written. plan left total shoulder arthroplasty vs reverse shoulder arthroplasty     Anesthesia/Surgery risks, benefits and alternative options discussed and understood by patient/family.          Active Hospital Problems    Diagnosis  POA    Arthritis of left shoulder region [M19.012]  Yes      Resolved Hospital Problems   No resolved problems to display.

## 2018-08-21 NOTE — OR NURSING
Left interscalene nerve block complete per Dr. Winter, no complications noted. Verbalized understanding.

## 2018-08-21 NOTE — TRANSFER OF CARE
"Anesthesia Transfer of Care Note    Patient: Shirley Metz    Procedure(s) Performed: Procedure(s) (LRB):  ARTHROPLASTY, SHOULDER, TOTAL, REVERSE (Left)    Patient location: PACU    Anesthesia Type: general    Transport from OR: Transported from OR on room air with adequate spontaneous ventilation    Post pain: adequate analgesia    Post assessment: no apparent anesthetic complications    Post vital signs: stable    Level of consciousness: responds to stimulation    Nausea/Vomiting: no nausea/vomiting    Complications: none    Transfer of care protocol was followed      Last vitals:   Visit Vitals  BP (!) 144/67 (BP Location: Right arm, Patient Position: Sitting)   Pulse 70   Temp 36.4 °C (97.5 °F) (Temporal)   Resp 18   Ht 5' 4" (1.626 m)   Wt 76 kg (167 lb 8.8 oz)   SpO2 97%   Breastfeeding? No   BMI 28.76 kg/m²     "

## 2018-08-22 LAB
ANION GAP SERPL CALC-SCNC: 8 MMOL/L
BASOPHILS # BLD AUTO: 0.02 K/UL
BASOPHILS NFR BLD: 0.2 %
BUN SERPL-MCNC: 29 MG/DL
CALCIUM SERPL-MCNC: 9 MG/DL
CHLORIDE SERPL-SCNC: 102 MMOL/L
CO2 SERPL-SCNC: 24 MMOL/L
CREAT SERPL-MCNC: 1.4 MG/DL
DIFFERENTIAL METHOD: ABNORMAL
EOSINOPHIL # BLD AUTO: 0 K/UL
EOSINOPHIL NFR BLD: 0.2 %
ERYTHROCYTE [DISTWIDTH] IN BLOOD BY AUTOMATED COUNT: 12.9 %
EST. GFR  (AFRICAN AMERICAN): 40 ML/MIN/1.73 M^2
EST. GFR  (NON AFRICAN AMERICAN): 34 ML/MIN/1.73 M^2
GLUCOSE SERPL-MCNC: 150 MG/DL
HCT VFR BLD AUTO: 31.6 %
HGB BLD-MCNC: 10.6 G/DL
LYMPHOCYTES # BLD AUTO: 1.2 K/UL
LYMPHOCYTES NFR BLD: 12.8 %
MCH RBC QN AUTO: 31.8 PG
MCHC RBC AUTO-ENTMCNC: 33.5 G/DL
MCV RBC AUTO: 95 FL
MONOCYTES # BLD AUTO: 1.2 K/UL
MONOCYTES NFR BLD: 12.9 %
NEUTROPHILS # BLD AUTO: 7.1 K/UL
NEUTROPHILS NFR BLD: 73.9 %
PLATELET # BLD AUTO: 198 K/UL
PMV BLD AUTO: 9.3 FL
POTASSIUM SERPL-SCNC: 3.9 MMOL/L
RBC # BLD AUTO: 3.33 M/UL
SODIUM SERPL-SCNC: 134 MMOL/L
WBC # BLD AUTO: 9.59 K/UL

## 2018-08-22 PROCEDURE — 25000003 PHARM REV CODE 250: Performed by: NURSE PRACTITIONER

## 2018-08-22 PROCEDURE — 99900035 HC TECH TIME PER 15 MIN (STAT)

## 2018-08-22 PROCEDURE — 63600175 PHARM REV CODE 636 W HCPCS: Performed by: NURSE PRACTITIONER

## 2018-08-22 PROCEDURE — 25000003 PHARM REV CODE 250: Performed by: PHYSICIAN ASSISTANT

## 2018-08-22 PROCEDURE — 94799 UNLISTED PULMONARY SVC/PX: CPT

## 2018-08-22 PROCEDURE — 63600175 PHARM REV CODE 636 W HCPCS: Performed by: PHYSICIAN ASSISTANT

## 2018-08-22 PROCEDURE — 80048 BASIC METABOLIC PNL TOTAL CA: CPT

## 2018-08-22 PROCEDURE — 11000001 HC ACUTE MED/SURG PRIVATE ROOM

## 2018-08-22 PROCEDURE — 85025 COMPLETE CBC W/AUTO DIFF WBC: CPT

## 2018-08-22 PROCEDURE — 94760 N-INVAS EAR/PLS OXIMETRY 1: CPT

## 2018-08-22 PROCEDURE — 36415 COLL VENOUS BLD VENIPUNCTURE: CPT

## 2018-08-22 RX ORDER — CHLORHEXIDINE GLUCONATE ORAL RINSE 1.2 MG/ML
10 SOLUTION DENTAL 2 TIMES DAILY
Status: CANCELLED | OUTPATIENT
Start: 2018-08-22 | End: 2018-08-27

## 2018-08-22 RX ORDER — OXYCODONE AND ACETAMINOPHEN 7.5; 325 MG/1; MG/1
1 TABLET ORAL EVERY 4 HOURS PRN
Qty: 40 TABLET | Refills: 0 | Status: SHIPPED | OUTPATIENT
Start: 2018-08-22 | End: 2018-10-10

## 2018-08-22 RX ORDER — BISACODYL 5 MG
5 TABLET, DELAYED RELEASE (ENTERIC COATED) ORAL DAILY PRN
Status: DISCONTINUED | OUTPATIENT
Start: 2018-08-22 | End: 2018-08-23 | Stop reason: HOSPADM

## 2018-08-22 RX ORDER — OXYCODONE AND ACETAMINOPHEN 7.5; 325 MG/1; MG/1
2 TABLET ORAL EVERY 6 HOURS PRN
Status: DISCONTINUED | OUTPATIENT
Start: 2018-08-22 | End: 2018-08-23 | Stop reason: HOSPADM

## 2018-08-22 RX ORDER — ONDANSETRON 4 MG/1
8 TABLET, ORALLY DISINTEGRATING ORAL EVERY 8 HOURS PRN
Qty: 20 TABLET | Refills: 0 | Status: SHIPPED | OUTPATIENT
Start: 2018-08-22 | End: 2018-10-10

## 2018-08-22 RX ORDER — MORPHINE SULFATE 4 MG/ML
4 INJECTION, SOLUTION INTRAMUSCULAR; INTRAVENOUS ONCE
Status: COMPLETED | OUTPATIENT
Start: 2018-08-22 | End: 2018-08-22

## 2018-08-22 RX ORDER — OXYCODONE AND ACETAMINOPHEN 7.5; 325 MG/1; MG/1
1 TABLET ORAL EVERY 4 HOURS PRN
Status: DISCONTINUED | OUTPATIENT
Start: 2018-08-22 | End: 2018-08-23 | Stop reason: HOSPADM

## 2018-08-22 RX ADMIN — CEFAZOLIN SODIUM 2 G: 2 SOLUTION INTRAVENOUS at 09:08

## 2018-08-22 RX ADMIN — OXYCODONE HYDROCHLORIDE AND ACETAMINOPHEN 1 TABLET: 5; 325 TABLET ORAL at 07:08

## 2018-08-22 RX ADMIN — OXYCODONE HYDROCHLORIDE AND ACETAMINOPHEN 1 TABLET: 5; 325 TABLET ORAL at 04:08

## 2018-08-22 RX ADMIN — OXYCODONE HYDROCHLORIDE AND ACETAMINOPHEN 1 TABLET: 5; 325 TABLET ORAL at 02:08

## 2018-08-22 RX ADMIN — BISACODYL 5 MG: 5 TABLET, COATED ORAL at 06:08

## 2018-08-22 RX ADMIN — SODIUM CHLORIDE: 0.9 INJECTION, SOLUTION INTRAVENOUS at 12:08

## 2018-08-22 RX ADMIN — CLONIDINE HYDROCHLORIDE 0.1 MG: 0.1 TABLET ORAL at 09:08

## 2018-08-22 RX ADMIN — LOSARTAN POTASSIUM 50 MG: 50 TABLET, FILM COATED ORAL at 09:08

## 2018-08-22 RX ADMIN — OXYCODONE HYDROCHLORIDE AND ACETAMINOPHEN 2 TABLET: 7.5; 325 TABLET ORAL at 06:08

## 2018-08-22 RX ADMIN — CEFAZOLIN SODIUM 2 G: 2 SOLUTION INTRAVENOUS at 01:08

## 2018-08-22 RX ADMIN — MORPHINE SULFATE 4 MG: 4 INJECTION INTRAVENOUS at 12:08

## 2018-08-22 RX ADMIN — CHLORHEXIDINE GLUCONATE 10 ML: 1.2 RINSE ORAL at 09:08

## 2018-08-22 RX ADMIN — TRAVOPROST 1 DROP: 0.04 SOLUTION/ DROPS OPHTHALMIC at 09:08

## 2018-08-22 NOTE — ASSESSMENT & PLAN NOTE
Continue Dyazide, clonidine and losartan.    8/22-  Continue current medications  Elevated BP today due to pain, pain medications adjusted.  Monitor

## 2018-08-22 NOTE — HOSPITAL COURSE
Ms Bettencourt is a 85 year old female who underwent left reverse total shoulder arthroplasty by Dr Lujan on yesterday. She has experienced increase Left shoulder pain today. Patient received dose of Morphine today and Percocet dose was increased. Blood pressure has also been elevated, thought to be related to pain. Denies associated symptoms of chest pain, shortness of breath, fever or chills.     8/23/2018- Patient seen and examined today. Reports less pain to left shoulder. Vital signs more stable. Ambulated in valles with assistance. Will monitor. Plan discharge today.

## 2018-08-22 NOTE — PLAN OF CARE
Problem: Patient Care Overview  Goal: Plan of Care Review  Outcome: Ongoing (interventions implemented as appropriate)  Pt had no adverse events during shift. IVF/abx administered as ordered. Pt kept LUE elevated and immobile during shift. Ambulated to the bathroom. Pt repositions in bed w/ minimal assistance. Sx site CDI. Applied ice to shoulder area and pt reported local relief. Pain well controlled w/ PRN pain meds. VSS. Chart reviewed, will continue to monitor.

## 2018-08-22 NOTE — SUBJECTIVE & OBJECTIVE
Past Medical History:   Diagnosis Date    Anxiety 11/28/2017    Arthritis     Back pain     Basal cell carcinoma 03/29/2018    left mid back    Colon polyps     reported per patient.     Fuchs' corneal dystrophy     General anesthetics causing adverse effect in therapeutic use     woke up during surgery and gagged on ET tube    Glaucoma     Heart failure with preserved left ventricular function (HFpEF) 7/24/2018    Hyperlipidemia     Hypertension     Macular degeneration dry    Obesity     Trouble in sleeping     Urinary tract infection        Past Surgical History:   Procedure Laterality Date    ADENOIDECTOMY  1938    BREAST BIOPSY Left     1997. benign    BREAST CYST EXCISION Left 1997 approx    CARPAL TUNNEL RELEASE Right 2012 approx    CATARACT EXTRACTION Bilateral 1994    CHOLECYSTECTOMY  1975    CORNEAL TRANSPLANT Bilateral 08/2015 8/2015 - left; Right 4/2016    EYE SURGERY      Fuch's Corneal dystrophy - had 2nd operation with Dr. Quintanilla    EYE SURGERY      Cataract wIOL    left thumb Left 2011    removed cuboid for arthritis    SLT- OS (aka TRABECULOPLASTY) Left 05/11/2011    repeat 3/14/12    TONSILLECTOMY  1938       Review of patient's allergies indicates:   Allergen Reactions    Claritin [loratadine] Other (See Comments)     Double vision/spots    Hydrocodone-acetaminophen      wheezing    Naproxen      wheezing    Verapamil Diarrhea    Vibramycin [doxycycline calcium] Other (See Comments)     Weakness nausea   sob    Amlodipine      Myalgias      Coreg [carvedilol] Swelling     lips    Fenofibrate      Causes muscle weakness    Hydralazine analogues      Muscle tremors/aches      Isosorbide     Lasix [furosemide]      myalgias    Moxifloxacin Other (See Comments)     Hives   muscle soreness    Timolol     Adhesive Rash     Clonidine patch - 2 mfg - contact dermatitis    Allegra [fexofenadine] Other (See Comments)     Double vision/spots     Codeine Diarrhea  and Other (See Comments)     Loss of balance     Erythromycin Other (See Comments)     Complete weakness/could not walk    Hydrocortisone (bulk) Other (See Comments)     Only suppository/ caused muscle weakness    Macrolide antibiotics Other (See Comments)     Complete weakness/could not walk    Patanol [olopatadine] Other (See Comments)     Muscle weakness    Prednisone Anxiety    Statins-hmg-coa reductase inhibitors Other (See Comments)     Muscle weakness    Xalatan [latanoprost] Other (See Comments)     Muscle weakness       No current facility-administered medications on file prior to encounter.      Current Outpatient Medications on File Prior to Encounter   Medication Sig    ALPHA LIPOIC ACID ORAL Take 200 mg by mouth.     ALPRAZolam (XANAX) 0.5 MG tablet TAKE 1/2 - 1 TABLET BY MOUTH NIGHTLY FOR SLEEP OR ANXIETY    cloNIDine (CATAPRES) 0.1 MG tablet Take 1 tablet (0.1 mg total) by mouth 2 (two) times daily.    coenzyme Q10 200 mg capsule Take 400 mg by mouth once daily.     fish oil-omega-3 fatty acids 300-1,000 mg capsule Take 2 g by mouth once daily.    losartan (COZAAR) 50 MG tablet Take 1 tablet (50 mg total) by mouth 2 (two) times daily.    LOTEMAX 0.5 % DrpG     metoprolol succinate (TOPROL-XL) 25 MG 24 hr tablet Take 2 tablets (50 mg total) by mouth once daily. (Patient taking differently: Take 50 mg by mouth once daily. )    POLYETHYLENE GLYCOL 3350 (MIRALAX ORAL) Take by mouth as needed.     pyridoxine, vitamin B6, (B-6) 100 MG Tab Take 100 mg by mouth 2 (two) times daily.     triamcinolone acetonide 0.1% (KENALOG) 0.1 % cream Apply topically 2 (two) times daily. For up to one week as needed    triamterene-hydrochlorothiazide 37.5-25 mg (DYAZIDE) 37.5-25 mg per capsule 1 capsule once daily.     TURMERIC ROOT EXTRACT ORAL Take 665 mg by mouth.     acetaminophen (TYLENOL) 500 MG tablet Take 500 mg by mouth once daily at 6am. Taking 1 to 3    B complex-vitamin C-folic acid 0.8 mg  Tab Take 1 tablet by mouth once daily.    Boswellia lashawn extract (BOSWELLIA LASHAWN XT, BULK, MISC) by Misc.(Non-Drug; Combo Route) route.    CALCIUM CITRATE ORAL Take by mouth.    cholecalciferol, vitamin D3, (VITAMIN D3) 2,000 unit Cap Take 1 capsule by mouth once daily.     Family History     Problem Relation (Age of Onset)    Cancer Father (88)    Heart disease Mother    Hypertension Mother        Tobacco Use    Smoking status: Never Smoker    Smokeless tobacco: Never Used    Tobacco comment: history of passive smoking from her ex-   Substance and Sexual Activity    Alcohol use: Yes     Alcohol/week: 1.2 oz     Types: 2 Standard drinks or equivalent per week     Comment: occasional     Drug use: No    Sexual activity: Not Currently     Partners: Male     Birth control/protection: Post-menopausal     Comment: discussed protection for STD's     Review of Systems   Constitutional: Negative for chills and fever.   HENT: Negative for congestion, rhinorrhea and sinus pressure.    Respiratory: Negative for apnea, cough, choking, chest tightness, shortness of breath, wheezing and stridor.    Cardiovascular: Negative for chest pain, palpitations and leg swelling.   Gastrointestinal: Negative for abdominal distention, abdominal pain, diarrhea, nausea and vomiting.   Endocrine: Negative for cold intolerance and heat intolerance.   Genitourinary: Negative for difficulty urinating and hematuria.   Musculoskeletal: Negative for arthralgias and joint swelling.        Left shoulder pain     Skin: Negative for color change, pallor and rash.   Neurological: Negative for dizziness, seizures, weakness, numbness and headaches.   Psychiatric/Behavioral: Negative for agitation. The patient is not nervous/anxious.      Objective:     Vital Signs (Most Recent):  Temp: 99.9 °F (37.7 °C) (08/22/18 1557)  Pulse: 91 (08/22/18 1557)  Resp: 20 (08/22/18 1156)  BP: (!) 149/67 (08/22/18 1557)  SpO2: 96 % (08/22/18 1557) Vital  Signs (24h Range):  Temp:  [97.9 °F (36.6 °C)-99.9 °F (37.7 °C)] 99.9 °F (37.7 °C)  Pulse:  [] 91  Resp:  [18-20] 20  SpO2:  [92 %-98 %] 96 %  BP: (113-186)/(53-72) 149/67     Weight: 76 kg (167 lb 8.8 oz)  Body mass index is 28.76 kg/m².    Physical Exam   HENT:   Head: Normocephalic and atraumatic.   Eyes: Right eye exhibits no discharge. Left eye exhibits no discharge.   Neck: No JVD present.   Cardiovascular: Exam reveals no gallop and no friction rub.   No murmur heard.  Pulmonary/Chest: No stridor. No respiratory distress. She has no wheezes. She has no rales. She exhibits no tenderness.   Abdominal: She exhibits no distension and no mass. There is no tenderness. There is no rebound and no guarding. No hernia.   Musculoskeletal: Normal range of motion. She exhibits no edema or deformity.   Lt arm abduction sling intact    Neurological: She is alert.   Skin: Skin is warm and dry.   Psychiatric: She has a normal mood and affect. Her behavior is normal. Judgment and thought content normal.   Nursing note and vitals reviewed.          Significant Labs:   CBC:   Recent Labs   Lab  08/22/18   0513   WBC  9.59   HGB  10.6*   HCT  31.6*   PLT  198     CMP:   Recent Labs   Lab  08/22/18   0513   NA  134*   K  3.9   CL  102   CO2  24   GLU  150*   BUN  29*   CREATININE  1.4   CALCIUM  9.0   ANIONGAP  8   EGFRNONAA  34*

## 2018-08-22 NOTE — PLAN OF CARE
Sep5 Hospital Follow Up with Solomon Lujan MD   Wednesday Sep 5, 2018 2:20 PM  [facility]   [id number] [facility] [id number] [phone]         08/22/18 1253   Final Note   Assessment Type Final Discharge Note   Discharge Disposition Home-[facility]   Hospital Follow Up  Appt(s) scheduled? Yes   Right Care Referral Info   Post Acute Recommendation Home-care   Facility Name [facility]

## 2018-08-22 NOTE — PLAN OF CARE
Problem: Patient Care Overview  Goal: Plan of Care Review  Outcome: Outcome(s) achieved Date Met: 08/22/18  Goals adequately met for discharge.

## 2018-08-22 NOTE — NURSING
Pt has urinated very small amounts since start of shift totaling less than 100mL. Bladder scan showed approx 120mL. Spoke to Darell Martinez NP about starting yang vs waiting, he ordered 1L of NS 100mL/hr x 12h. Will monitor pt for need for yang or if needed will d/c fluids.

## 2018-08-22 NOTE — PROGRESS NOTES
[facility] Hospital Medicine  Progress Note    Patient Name: Shirley Metz  MRN: [id number]  Patient Class: IP- Inpatient   Admission Date: 8/21/2018  Length of Stay: 1 days  Attending Physician: Solomon Lujan MD  Primary Care Provider: Yandy Terrell MD        Subjective:     Principal Problem:Arthritis of left shoulder region    HPI:  Shirley Metz is an 85 year old female with CHF, hypertension and hyperlipidemia who presented for elective left total reverse shoulder arthroplasty performed by Dr. Lujan today. The patient tolerated the procedure well. She denies uncontrolled shoulder pain. Prior to surgery, the patient reports feeling well. She denies fever, cough, SOB and lower extremity edema. Code status was discussed with the patient. She is a full code. Her son, Boris Bettencourt is her medical power of .     Hospital Course:  Ms Bettencourt is a 85 year old female who underwent left reverse total shoulder arthroplasty by Dr Lujan on yesterday. She has experienced increase Left shoulder pain today. Patient received dose of Morphine today and Percocet dose was increased. Blood pressure has also been elevated, thought to be related to pain. Denies associated symptoms of chest pain, shortness of breath, fever or chills.     Past Medical History:   Diagnosis Date    Anxiety 11/28/2017    Arthritis     Back pain     Basal cell carcinoma 03/29/2018    left mid back    Colon polyps     reported per patient.     Fuchs' corneal dystrophy     General anesthetics causing adverse effect in therapeutic use     woke up during surgery and gagged on ET tube    Glaucoma     Heart failure with preserved left ventricular function (HFpEF) 7/24/2018    Hyperlipidemia     Hypertension     Macular degeneration dry    Obesity     Trouble in sleeping     Urinary tract infection        Past Surgical History:   Procedure Laterality Date    ADENOIDECTOMY  1938    BREAST BIOPSY  Left     1997. benign    BREAST CYST EXCISION Left 1997 approx    CARPAL TUNNEL RELEASE Right 2012 approx    CATARACT EXTRACTION Bilateral 1994    CHOLECYSTECTOMY  1975    CORNEAL TRANSPLANT Bilateral 08/2015 8/2015 - left; Right 4/2016    EYE SURGERY      Fuch's Corneal dystrophy - had 2nd operation with Dr. Quintanilla    EYE SURGERY      Cataract wIOL    left thumb Left 2011    removed cuboid for arthritis    SLT- OS (aka TRABECULOPLASTY) Left 05/11/2011    repeat 3/14/12    TONSILLECTOMY  1938       Review of patient's allergies indicates:   Allergen Reactions    Claritin [loratadine] Other (See Comments)     Double vision/spots    Hydrocodone-acetaminophen      wheezing    Naproxen      wheezing    Verapamil Diarrhea    Vibramycin [doxycycline calcium] Other (See Comments)     Weakness nausea   sob    Amlodipine      Myalgias      Coreg [carvedilol] Swelling     lips    Fenofibrate      Causes muscle weakness    Hydralazine analogues      Muscle tremors/aches      Isosorbide     Lasix [furosemide]      myalgias    Moxifloxacin Other (See Comments)     Hives   muscle soreness    Timolol     Adhesive Rash     Clonidine patch - 2 mfg - contact dermatitis    Allegra [fexofenadine] Other (See Comments)     Double vision/spots     Codeine Diarrhea and Other (See Comments)     Loss of balance     Erythromycin Other (See Comments)     Complete weakness/could not walk    Hydrocortisone (bulk) Other (See Comments)     Only suppository/ caused muscle weakness    Macrolide antibiotics Other (See Comments)     Complete weakness/could not walk    Patanol [olopatadine] Other (See Comments)     Muscle weakness    Prednisone Anxiety    Statins-hmg-coa reductase inhibitors Other (See Comments)     Muscle weakness    Xalatan [latanoprost] Other (See Comments)     Muscle weakness       No current facility-administered medications on file prior to encounter.      Current Outpatient Medications on File  Prior to Encounter   Medication Sig    ALPHA LIPOIC ACID ORAL Take 200 mg by mouth.     ALPRAZolam (XANAX) 0.5 MG tablet TAKE 1/2 - 1 TABLET BY MOUTH NIGHTLY FOR SLEEP OR ANXIETY    cloNIDine (CATAPRES) 0.1 MG tablet Take 1 tablet (0.1 mg total) by mouth 2 (two) times daily.    coenzyme Q10 200 mg capsule Take 400 mg by mouth once daily.     fish oil-omega-3 fatty acids 300-1,000 mg capsule Take 2 g by mouth once daily.    losartan (COZAAR) 50 MG tablet Take 1 tablet (50 mg total) by mouth 2 (two) times daily.    LOTEMAX 0.5 % DrpG     metoprolol succinate (TOPROL-XL) 25 MG 24 hr tablet Take 2 tablets (50 mg total) by mouth once daily. (Patient taking differently: Take 50 mg by mouth once daily. )    POLYETHYLENE GLYCOL 3350 (MIRALAX ORAL) Take by mouth as needed.     pyridoxine, vitamin B6, (B-6) 100 MG Tab Take 100 mg by mouth 2 (two) times daily.     triamcinolone acetonide 0.1% (KENALOG) 0.1 % cream Apply topically 2 (two) times daily. For up to one week as needed    triamterene-hydrochlorothiazide 37.5-25 mg (DYAZIDE) 37.5-25 mg per capsule 1 capsule once daily.     TURMERIC ROOT EXTRACT ORAL Take 665 mg by mouth.     acetaminophen (TYLENOL) 500 MG tablet Take 500 mg by mouth once daily at 6am. Taking 1 to 3    B complex-vitamin C-folic acid 0.8 mg Tab Take 1 tablet by mouth once daily.    Boswellia lashawn extract (BOSWELLIA LASHAWN XT, BULK, MISC) by Misc.(Non-Drug; Combo Route) route.    CALCIUM CITRATE ORAL Take by mouth.    cholecalciferol, vitamin D3, (VITAMIN D3) 2,000 unit Cap Take 1 capsule by mouth once daily.     Family History     Problem Relation (Age of Onset)    Cancer Father (88)    Heart disease Mother    Hypertension Mother        Tobacco Use    Smoking status: Never Smoker    Smokeless tobacco: Never Used    Tobacco comment: history of passive smoking from her ex-   Substance and Sexual Activity    Alcohol use: Yes     Alcohol/week: 1.2 oz     Types: 2 Standard  drinks or equivalent per week     Comment: occasional     Drug use: No    Sexual activity: Not Currently     Partners: Male     Birth control/protection: Post-menopausal     Comment: discussed protection for STD's     Review of Systems   Constitutional: Negative for chills and fever.   HENT: Negative for congestion, rhinorrhea and sinus pressure.    Respiratory: Negative for apnea, cough, choking, chest tightness, shortness of breath, wheezing and stridor.    Cardiovascular: Negative for chest pain, palpitations and leg swelling.   Gastrointestinal: Negative for abdominal distention, abdominal pain, diarrhea, nausea and vomiting.   Endocrine: Negative for cold intolerance and heat intolerance.   Genitourinary: Negative for difficulty urinating and hematuria.   Musculoskeletal: Negative for arthralgias and joint swelling.        Left shoulder pain     Skin: Negative for color change, pallor and rash.   Neurological: Negative for dizziness, seizures, weakness, numbness and headaches.   Psychiatric/Behavioral: Negative for agitation. The patient is not nervous/anxious.      Objective:     Vital Signs (Most Recent):  Temp: 99.9 °F (37.7 °C) (08/22/18 1557)  Pulse: 91 (08/22/18 1557)  Resp: 20 (08/22/18 1156)  BP: (!) 149/67 (08/22/18 1557)  SpO2: 96 % (08/22/18 1557) Vital Signs (24h Range):  Temp:  [97.9 °F (36.6 °C)-99.9 °F (37.7 °C)] 99.9 °F (37.7 °C)  Pulse:  [] 91  Resp:  [18-20] 20  SpO2:  [92 %-98 %] 96 %  BP: (113-186)/(53-72) 149/67     Weight: 76 kg (167 lb 8.8 oz)  Body mass index is 28.76 kg/m².    Physical Exam   HENT:   Head: Normocephalic and atraumatic.   Eyes: Right eye exhibits no discharge. Left eye exhibits no discharge.   Neck: No JVD present.   Cardiovascular: Exam reveals no gallop and no friction rub.   No murmur heard.  Pulmonary/Chest: No stridor. No respiratory distress. She has no wheezes. She has no rales. She exhibits no tenderness.   Abdominal: She exhibits no distension and no  mass. There is no tenderness. There is no rebound and no guarding. No hernia.   Musculoskeletal: Normal range of motion. She exhibits no edema or deformity.   Lt arm abduction sling intact    Neurological: She is alert.   Skin: Skin is warm and dry.   Psychiatric: She has a normal mood and affect. Her behavior is normal. Judgment and thought content normal.   Nursing note and vitals reviewed.          Significant Labs:   CBC:   Recent Labs   Lab  08/22/18 0513   WBC  9.59   HGB  10.6*   HCT  31.6*   PLT  198     CMP:   Recent Labs   Lab  08/22/18 0513   NA  134*   K  3.9   CL  102   CO2  24   GLU  150*   BUN  29*   CREATININE  1.4   CALCIUM  9.0   ANIONGAP  8   EGFRNONAA  34*                 Assessment/Plan:      * Arthritis of left shoulder region    -S/P reverse total arthroplasty.   -Management per Orthopedics.     8/22  Patient seen and examined  Orthopedic following  Morphine 4 mg IV X 1  Percocet dose increased by orthopedic for better pain control.           Heart failure with preserved left ventricular function (HFpEF)    -Chronic and appears compensated.   -Monitor volume status.   -Continue metoprolol.     8/22-  History of HFpEF  No sign of ADHF on exam   Monitor           Anxiety    Continue Xanax as needed.           HTN (hypertension)    Continue Dyazide, clonidine and losartan.    8/22-  Continue current medications  Elevated BP today due to pain, pain medications adjusted.  Monitor           VTE Risk Mitigation (From admission, onward)        Ordered     IP VTE LOW RISK PATIENT  Once      08/21/18 0725     Place sequential compression device  Until discontinued      08/21/18 0725              Milton Stanford NP  Department of Hospital Medicine   [facility]

## 2018-08-22 NOTE — NURSING
Patient c/o unbearable pain and threatening to pass out.  Tai Kwok notified multiple times.  No changes to medication.  Patient refused available pain medication.  With OCT help, assisted patient to bedside commode.  Provided patient with privacy.  Will continue to monitor.

## 2018-08-22 NOTE — PLAN OF CARE
CM met with patient/family at the bedside to discuss discharge planning.  Patient lives alone in an apartment at [facility].  She has services through [facility], but was told that she would have home health at discharge.  She states that she was driving prior to admission but [place] has a bus and she has family that can transport her until she is able to drive again.  She has a rollator that she uses for longer distances.  CM to follow.  CM provided a transitional care folder, information on advanced directives, information on pharmacy bedside delivery, and discharge planning begins on admission with contact information for any needs/questions.    [facility]  [address] [place] [address]  Phone: [phone] Fax: [phone]    Yandy Terrell MD  Payor: [employer] MEDICARE / Plan: [employer] MEDICARE HMO / Product Type: Capitation /       08/22/18 0826   Discharge Assessment   Assessment Type Discharge Planning Assessment   Confirmed/corrected address and phone number on facesheet? Yes   Assessment information obtained from? Patient;Medical Record   Expected Length of Stay (days) (1-2)   Communicated expected length of stay with patient/caregiver yes   Prior to hospitilization cognitive status: Alert/Oriented   Prior to hospitalization functional status: Independent;Assistive Equipment   Current cognitive status: Alert/Oriented   Current Functional Status: Independent;Assistive Equipment;Needs Assistance   Facility Arrived From: home   Lives With alone  ([facility])   Able to Return to Prior Arrangements yes   Is patient able to care for self after discharge? Yes   Who are your caregiver(s) and their phone number(s)? Arie Bettencourt, son [phone]   Patient's perception of discharge disposition home health   Readmission Within The Last 30 Days no previous admission in last 30 days   Patient currently being followed by outpatient case  management? No   Patient currently receives any other outside agency services? No   Equipment Currently Used at Home rollator   Do you have any problems affording any of your prescribed medications? No   Is the patient taking medications as prescribed? yes   Does the patient have transportation home? Yes   Transportation Available family or friend will provide   Dialysis Name and Scheduled days Na   Does the patient receive services at the [facility]mad[facility] Clinic? (na)   Discharge Plan A Home;Home Health   Patient/Family In Agreement With Plan yes

## 2018-08-22 NOTE — ASSESSMENT & PLAN NOTE
-Chronic and appears compensated.   -Monitor volume status.   -Continue metoprolol.     8/22-  History of HFpEF  No sign of ADHF on exam   Monitor

## 2018-08-22 NOTE — PROGRESS NOTES
Orthopedic Sports Surgery Rounding Note     2018    Patient is status post left reverse total shoulder replacement performed on 18.  Pain is better controlled now that she was given a one time dose of Morphine per HM. No fever, vomiting, shortness-of-breath or chest pain.     Discussed treatment and discharge plans.    Vital Signs:    Vitals:    18 0352 18 0737 18 0759 18 1156   BP: 132/60  (!) 142/65 (!) 186/72   BP Location: Right arm  Right arm Right arm   Patient Position: Lying  Sitting Lying   Pulse: 91  88 86   Resp: 20  19 20   Temp: 98.6 °F (37 °C)  99 °F (37.2 °C) 98.3 °F (36.8 °C)   TempSrc: Oral  Oral Oral   SpO2: (!) 92% 98% 95% 98%   Weight:       Height:           Temp (48hrs), Av.3 °F (36.8 °C), Min:97.5 °F (36.4 °C), Max:99 °F (37.2 °C)      Recent Lab Results:  Recent Labs   Lab  18   0513   NA  134*   K  3.9   CL  102   CO2  24   BUN  29*   CREATININE  1.4   GLU  150*   CALCIUM  9.0     Recent Labs      18   0513   WBC  9.59   HGB  10.6*   HCT  31.6*   PLT  198         Physical Exam:  A/O *3. No acute distress    Left Upper Extremity  Dressing/Incision C/D/I  Sensation Present  Cap refill less than 2 sec  Compartment soft, NT  2+ DR pulse  AIN and PIN function - intact      Assessment:  Left shoulder, status post reverse total shoulder replacement, POD # 1    Plan:  Pain Control- one time dose with Morphine per HM. Switch pain medication to Percocet 7.5 Q4H  DVT prophylaxis with ASA 81 mg  Left LE NWB  Antibiotics Ancef- completed 3 doses   D/C planning with [facility]  Follow-up in 2 weeks with Dr. Lujan    Disposition: Pain control, DC when stable today or tomorrow    Steve Galarza PA-C  Orthopedic Surgery

## 2018-08-22 NOTE — ASSESSMENT & PLAN NOTE
-S/P reverse total arthroplasty.   -Management per Orthopedics.     8/22  Patient seen and examined  Orthopedic following  Morphine 4 mg IV X 1  Percocet dose increased by orthopedic for better pain control.

## 2018-08-22 NOTE — NURSING
"Reviewed pt meds and pt was about to take them when she said she could not take the Xanax or Clonidine b/c she takes "half a pink pill" of each at home and was very unsure of the dosage. Told pt the meds had been reviewed but she refused. Pt also has multiple eye meds at bedside. Told pt we could take them down to be approved by pharmacy so we can administer while she is here, she said she could not let anyone take them b/c they are too expensive. She also does not want them filled while she is here for the same reason.   "

## 2018-08-23 VITALS
SYSTOLIC BLOOD PRESSURE: 109 MMHG | BODY MASS INDEX: 28.6 KG/M2 | OXYGEN SATURATION: 94 % | WEIGHT: 167.56 LBS | DIASTOLIC BLOOD PRESSURE: 53 MMHG | HEIGHT: 64 IN | TEMPERATURE: 99 F | HEART RATE: 80 BPM | RESPIRATION RATE: 20 BRPM

## 2018-08-23 PROCEDURE — 25000003 PHARM REV CODE 250: Performed by: PHYSICIAN ASSISTANT

## 2018-08-23 PROCEDURE — 99900035 HC TECH TIME PER 15 MIN (STAT)

## 2018-08-23 RX ADMIN — OXYCODONE HYDROCHLORIDE AND ACETAMINOPHEN 1 TABLET: 7.5; 325 TABLET ORAL at 12:08

## 2018-08-23 RX ADMIN — ONDANSETRON 8 MG: 8 TABLET, ORALLY DISINTEGRATING ORAL at 09:08

## 2018-08-23 RX ADMIN — OXYCODONE HYDROCHLORIDE AND ACETAMINOPHEN 1 TABLET: 7.5; 325 TABLET ORAL at 07:08

## 2018-08-23 NOTE — NURSING
Patient refused all morning medications and says she could not eat her breakfast.  50% of breakfast meal consumed.  When asked if patient would like something different, she requested choices.  Called dietary from room.  Dietary personnel states that patient had already disclosed wanting a different meal, but refused any other options.  Dietary personnel to call room again and verify what patient would like.

## 2018-08-23 NOTE — NURSING
"Patient states she "does not feel good" sitting up in chair.  Asked patient if she would like to lay down, she denied.  No acute distress or injury noted to patient.  Vital signs in stable condition.  No ssx of pain.  Patient c/o nausea, but drinking coffee and juice.  Will continue to monitor.   "

## 2018-08-23 NOTE — PLAN OF CARE
Problem: Patient Care Overview  Goal: Plan of Care Review  Outcome: Outcome(s) achieved Date Met: 08/23/18  POC reviewed, including indications and possible side effects of administered medications. Patient verbalized understanding and teach back. No adverse reactions noted. Patient c/o L should pain. Administered medications per order. Sling in place. VSS. Sitter at bedside. Patient remains free of falls and injuries during shift. Will continue to monitor.    Chart check complete.

## 2018-08-23 NOTE — NURSING
Verbalized understanding of discharge paperwork, follow up care, prescriptions, etc.  No acute distress noted.  Will continue to monitor until discharge.

## 2018-08-23 NOTE — ASSESSMENT & PLAN NOTE
Continue Dyazide, clonidine and losartan.    8/22-  Continue current medications  Elevated BP today due to pain, pain medications adjusted.  Monitor     8/23-   BP stable

## 2018-08-23 NOTE — ASSESSMENT & PLAN NOTE
-S/P reverse total arthroplasty.   -Management per Orthopedics.     8/22  Patient seen and examined  Orthopedic following  Morphine 4 mg IV X 1  Percocet dose increased by orthopedic for better pain control.     8/23-  S/p Lt reverse total shoulder arthroplasty.   Seen and examined today.  Reports Lt shoulder pain decreased   Abduction sling intact

## 2018-08-23 NOTE — SUBJECTIVE & OBJECTIVE
Interval History: Patient seen and examined. Vital signs stable. Ambulated in valles with assistance. Abduction sling intact to left arm, Left shoulder dressing intact. Plan discharge today.     Review of Systems   Constitutional: Negative for chills and fever.   HENT: Negative for congestion, rhinorrhea and sinus pressure.    Respiratory: Negative for apnea, cough, choking, chest tightness, shortness of breath, wheezing and stridor.    Cardiovascular: Negative for chest pain, palpitations and leg swelling.   Gastrointestinal: Negative for abdominal distention, abdominal pain, diarrhea, nausea and vomiting.   Endocrine: Negative for cold intolerance and heat intolerance.   Genitourinary: Negative for difficulty urinating and hematuria.   Musculoskeletal: Negative for arthralgias and joint swelling.        Left shoulder pain     Skin: Negative for color change, pallor and rash.   Neurological: Negative for dizziness, seizures, weakness, numbness and headaches.   Psychiatric/Behavioral: Negative for agitation. The patient is nervous/anxious.      Objective:     Vital Signs (Most Recent):  Temp: 98.5 °F (36.9 °C) (08/23/18 1143)  Pulse: 80 (08/23/18 1143)  Resp: 20 (08/23/18 1143)  BP: (!) 109/53 (08/23/18 1143)  SpO2: (!) 94 % (08/23/18 1143) Vital Signs (24h Range):  Temp:  [98.3 °F (36.8 °C)-99.9 °F (37.7 °C)] 98.5 °F (36.9 °C)  Pulse:  [80-96] 80  Resp:  [16-20] 20  SpO2:  [94 %-98 %] 94 %  BP: (109-186)/(53-72) 109/53     Weight: 76 kg (167 lb 8.8 oz)  Body mass index is 28.76 kg/m².    Intake/Output Summary (Last 24 hours) at 8/23/2018 1144  Last data filed at 8/23/2018 0452  Gross per 24 hour   Intake 540 ml   Output 900 ml   Net -360 ml      Physical Exam   HENT:   Head: Normocephalic and atraumatic.   Eyes: Right eye exhibits no discharge. Left eye exhibits no discharge.   Neck: No JVD present.   Cardiovascular: Exam reveals no gallop and no friction rub.   No murmur heard.  Pulmonary/Chest: No stridor. No  respiratory distress. She has no wheezes. She has no rales. She exhibits no tenderness.   Abdominal: She exhibits no distension and no mass. There is no tenderness. There is no rebound and no guarding. No hernia.   Musculoskeletal: Normal range of motion. She exhibits no edema or deformity.   Lt arm abduction sling intact    Neurological: She is alert.   Skin: Skin is warm and dry.   Psychiatric: Her mood appears anxious.   Nursing note and vitals reviewed.      Significant Labs:   CBC:   Recent Labs   Lab  08/22/18   0513   WBC  9.59   HGB  10.6*   HCT  31.6*   PLT  198     CMP:   Recent Labs   Lab  08/22/18   0513   NA  134*   K  3.9   CL  102   CO2  24   GLU  150*   BUN  29*   CREATININE  1.4   CALCIUM  9.0   ANIONGAP  8   EGFRNONAA  34*

## 2018-08-23 NOTE — PLAN OF CARE
Problem: Patient Care Overview  Goal: Plan of Care Review  Outcome: Outcome(s) achieved Date Met: 08/23/18  Goals adequately met for discharge.

## 2018-08-23 NOTE — NURSING
"Called to room by patient. Upon entering room, pt sitting up in chair in NAD.  Pt stated that when she sits up that her head "spins" and that she cant swallow food but patient also stated that she was able to get some biscuit and estrada this am.  Pt stated she was concerned with being dc. I informed pt that I would contact VINITA Bonilla to come assess pt. Patient agreeable with this.  Contacted VINITA Bonilla of the above and primary nurse ALPHONSE Mayers aware.   "

## 2018-08-24 NOTE — DISCHARGE SUMMARY
Discharge Note    SUMMARY     Admit Date: 8/21/2018    Discharge Date and Time:  8/23/2018  1:10 PM    Pre-op Diagnosis:  Primary osteoarthritis of left shoulder [M19.012]     * Primary osteoarthritis of left shoulder [M19.012]  Rotator cuff tear high grade partial thickness left shoulder  Cysts left humeral head involving metaphysis and extending to the rotator cuff insertion    Post-op Diagnosis:  Post-Op Diagnosis Codes:     * Primary osteoarthritis of left shoulder [M19.012]  Rotator cuff tear high grade partial thickness left shoulder  Cysts left humeral head involving metaphysis and extending to the rotator cuff insertion    Procedure: Procedure(s) (LRB):  ARTHROPLASTY, SHOULDER, TOTAL, REVERSE (Left)    Hospital Course (synopsis of major diagnoses, care, treatment, and services provided during the course of the hospital stay):      Shirley Metz underwent same day outpatient surgery with procedure(s) noted above without complication. She had short stay in hospital for pain control.  Shirley was discharged from the hospital in stable condition to home with appropriate follow up as an outpatient arranged.    Final Diagnosis: Post-Op Diagnosis Codes:     * Primary osteoarthritis of left shoulder [M19.012]  Rotator cuff tear high grade partial thickness left shoulder  Cysts left humeral head involving metaphysis and extending to the rotator cuff insertion    Disposition: Home or Self Care    Follow Up/Patient Instructions: see discharge instructions    Medications:  Reconciled Home Medications:      Medication List      START taking these medications    nozaseptin nasal   Commonly known as:  NOZIN  1 each by Each Nare route 2 (two) times daily.     ondansetron 4 MG Tbdl  Commonly known as:  ZOFRAN-ODT  Take 2 tablets (8 mg total) by mouth every 8 (eight) hours as needed.     oxyCODONE-acetaminophen 7.5-325 mg per tablet  Commonly known as:  PERCOCET  Take 1 tablet by mouth every 4 (four) hours as  needed for Pain. If needed, take 1-2 tablets every 4-8 hrs for severe pain.        CONTINUE taking these medications    acetaminophen 500 MG tablet  Commonly known as:  TYLENOL  Take 500 mg by mouth once daily at 6am. Taking 1 to 3     ALPHA LIPOIC ACID ORAL  Take 200 mg by mouth.     ALPRAZolam 0.5 MG tablet  Commonly known as:  XANAX  TAKE 1/2 - 1 TABLET BY MOUTH NIGHTLY FOR SLEEP OR ANXIETY     B complex-vitamin C-folic acid 0.8 mg Tab  Take 1 tablet by mouth once daily.     BOSWELLIA LASHAWN XT (BULK) MISC  by Misc.(Non-Drug; Combo Route) route.     CALCIUM CITRATE ORAL  Take by mouth.     cloNIDine 0.1 MG tablet  Commonly known as:  CATAPRES  Take 1 tablet (0.1 mg total) by mouth 2 (two) times daily.     coenzyme Q10 200 mg capsule  Take 400 mg by mouth once daily.     fish oil-omega-3 fatty acids 300-1,000 mg capsule  Take 2 g by mouth once daily.     losartan 50 MG tablet  Commonly known as:  COZAAR  Take 1 tablet (50 mg total) by mouth 2 (two) times daily.     LOTEMAX 0.5 % Drpg  Generic drug:  loteprednol etabonate     metoprolol succinate 25 MG 24 hr tablet  Commonly known as:  TOPROL-XL  Take 2 tablets (50 mg total) by mouth once daily.     MIRALAX ORAL  Take by mouth as needed.     * netarsudil 0.02 % Drop  Commonly known as:  RHOPRESSA  Place 1 drop into the left eye every evening.     * netarsudil 0.02 % Drop  Commonly known as:  RHOPRESSA  Place 1 drop into both eyes every evening.     pyridoxine (vitamin B6) 100 MG Tab  Commonly known as:  B-6  Take 100 mg by mouth 2 (two) times daily.     travoprost 0.004 % ophthalmic solution  Commonly known as:  TRAVATAN Z  Place 1 drop into the left eye every evening.     triamcinolone acetonide 0.1% 0.1 % cream  Commonly known as:  KENALOG  Apply topically 2 (two) times daily. For up to one week as needed     triamterene-hydrochlorothiazide 37.5-25 mg 37.5-25 mg per capsule  Commonly known as:  DYAZIDE  1 capsule once daily.     TURMERIC ROOT EXTRACT ORAL  Take  665 mg by mouth.     VITAMIN D3 2,000 unit Cap  Generic drug:  cholecalciferol (vitamin D3)  Take 1 capsule by mouth once daily.         * This list has 2 medication(s) that are the same as other medications prescribed for you. Read the directions carefully, and ask your doctor or other care provider to review them with you.              Discharge Procedure Orders   Diet general     Call MD for:  temperature >100.4     Call MD for:  persistent nausea and vomiting     Call MD for:  severe uncontrolled pain     Call MD for:  difficulty breathing, headache or visual disturbances     Call MD for:  redness, tenderness, or signs of infection (pain, swelling, redness, odor or green/yellow discharge around incision site)     Call MD for:  hives     Ice to affected area     Lifting restrictions     Keep surgical extremity elevated     Non weight bearing     Follow-up Information     [facility] [place] [facility].    Specialty:  Home Health Services  Contact information:  [address], [address]  [place] [address]  [phone]             Solomon Lujan MD. Schedule an appointment as soon as possible for a visit in 14 days.    Specialty:  Orthopedic Surgery  Why:  For wound re-check  Contact information:  [id number] [place] [address] [address] [place] [id number]  [phone]

## 2018-08-27 ENCOUNTER — PATIENT OUTREACH (OUTPATIENT)
Dept: ADMINISTRATIVE | Facility: CLINIC | Age: 83
End: 2018-08-27

## 2018-08-27 NOTE — PATIENT INSTRUCTIONS
After Reverse Total Shoulder Replacement  Reverse total shoulder replacement is a type of surgery. Its done to repair an injury to the rotator cuff. The rotator cuff is a group of muscles and tendons that hold the shoulder joint together.  After your surgery  After the procedure, you will spend several hours in a recovery room. You may be sleepy and confused when you wake up. Your health care team will watch your vital signs, such as your heart rate and breathing. Your arm and shoulder will feel numb if you had regional anesthesia. This will begin to wear off over several hours. Youll be given pain medicine if you need it. You may also be given antibiotic medicine. This is to help prevent infection.  Youll likely be in the hospital for 2 to 3 days. To help lessen pain and swelling, a cooling device may be used on your shoulder. You will likely have follow-up X-rays. These are to make sure the artificial ball and socket are in place.  You may begin physical therapy in the hospital. This is to help you regain strength and movement.  Recovering at home  Follow all the instructions your health care provider gives you for medicines, exercise, diet, and wound care. Your arm will probably be in a sling for several weeks. You will continue to have some pain as you heal. But any pain you had from the rotator cuff injury and joint damage should be gone.  Continue to use the cooling device on your shoulder. Do physical therapy as instructed. Limit the use of your arm and shoulder as instructed. Your doctor will tell you when you can return to normal activities.  Follow-up care  Make sure to keep all of your follow-up appointments with your surgeon and with your physical therapist. Most joints last for several years before they need to be replaced. Talk with your surgeon about what he or she expects for you.     When to call your health care provider  Call your health care provider right away if any of these  occur:  · Fever of 100.4°F (38°C) or higher  · Redness, swelling, or fluid leaking from your incision that gets worse  · Pain that gets worse  · Symptoms that dont get better, or get worse  · New symptoms   Date Last Reviewed: 6/16/2015  © 8549-7092 [employer]. [address] [place] [address], [place] [address]. All rights reserved. This information is not intended as a substitute for professional medical care. Always follow your healthcare professional's instructions.

## 2018-08-28 ENCOUNTER — PATIENT MESSAGE (OUTPATIENT)
Dept: ORTHOPEDICS | Facility: CLINIC | Age: 83
End: 2018-08-28

## 2018-08-30 ENCOUNTER — PATIENT MESSAGE (OUTPATIENT)
Dept: ORTHOPEDICS | Facility: CLINIC | Age: 83
End: 2018-08-30

## 2018-08-31 DIAGNOSIS — R52 PAIN: Primary | ICD-10-CM

## 2018-09-05 ENCOUNTER — OFFICE VISIT (OUTPATIENT)
Dept: ORTHOPEDICS | Facility: CLINIC | Age: 83
End: 2018-09-05
Payer: MEDICARE

## 2018-09-05 ENCOUNTER — TELEPHONE (OUTPATIENT)
Dept: ORTHOPEDICS | Facility: CLINIC | Age: 83
End: 2018-09-05

## 2018-09-05 ENCOUNTER — HOSPITAL ENCOUNTER (OUTPATIENT)
Dept: RADIOLOGY | Facility: HOSPITAL | Age: 83
Discharge: HOME | End: 2018-09-05
Attending: ORTHOPAEDIC SURGERY
Payer: MEDICARE

## 2018-09-05 VITALS
HEART RATE: 97 BPM | DIASTOLIC BLOOD PRESSURE: 70 MMHG | BODY MASS INDEX: 28.51 KG/M2 | WEIGHT: 167 LBS | SYSTOLIC BLOOD PRESSURE: 118 MMHG | HEIGHT: 64 IN | TEMPERATURE: 97 F | RESPIRATION RATE: 18 BRPM

## 2018-09-05 DIAGNOSIS — R52 PAIN: ICD-10-CM

## 2018-09-05 DIAGNOSIS — M19.012 PRIMARY OSTEOARTHRITIS OF LEFT SHOULDER: Primary | ICD-10-CM

## 2018-09-05 PROCEDURE — 99999 PR PBB SHADOW E&M-EST. PATIENT-LVL V: CPT | Mod: PBBFAC,,, | Performed by: ORTHOPAEDIC SURGERY

## 2018-09-05 PROCEDURE — 99215 OFFICE O/P EST HI 40 MIN: CPT | Mod: PBBFAC,25 | Performed by: ORTHOPAEDIC SURGERY

## 2018-09-05 PROCEDURE — 73030 X-RAY EXAM OF SHOULDER: CPT | Mod: TC,LT

## 2018-09-05 PROCEDURE — 99024 POSTOP FOLLOW-UP VISIT: CPT | Mod: ,,, | Performed by: ORTHOPAEDIC SURGERY

## 2018-09-05 PROCEDURE — 73030 X-RAY EXAM OF SHOULDER: CPT | Mod: 26,LT,, | Performed by: RADIOLOGY

## 2018-09-05 NOTE — PROGRESS NOTES
Subjective:     Patient ID: Shirley Metz is a 85 y.o. female.    Chief Complaint: Post-op Evaluation, Pain, and Follow-up of the Left Shoulder    She is post op visit 1 left reverse TSA  No F or C  Pain rated 8/10 today      Shoulder Pain    The pain is present in the left shoulder and right shoulder. This is a new problem. The current episode started more than 1 month ago. The problem occurs intermittently. The problem has been gradually improving. The quality of the pain is described as sharp, aching, throbbing and tightness. The pain is at a severity of 8/10. Associated symptoms include an inability to bear weight, a limited range of motion and stiffness. Pertinent negatives include no fever, itching or numbness. The symptoms are aggravated by activity, bearing weight, lying down, lifting and contact. She has tried OTC pain meds, OTC ointments, heat and brace/corset for the symptoms. The treatment provided mild relief. Physical therapy was ineffective.      Past Medical History:   Diagnosis Date    Anxiety 11/28/2017    Arthritis     Back pain     Basal cell carcinoma 03/29/2018    left mid back    Colon polyps     reported per patient.     Fuchs' corneal dystrophy     General anesthetics causing adverse effect in therapeutic use     woke up during surgery and gagged on ET tube    Glaucoma     Heart failure with preserved left ventricular function (HFpEF) 7/24/2018    Hyperlipidemia     Hypertension     Macular degeneration dry    Obesity     Trouble in sleeping     Urinary tract infection      Past Surgical History:   Procedure Laterality Date    ADENOIDECTOMY  1938    ARTHROPLASTY, SHOULDER, TOTAL, REVERSE Left 8/21/2018    [facility]    BREAST BIOPSY Left     1997. benign    BREAST CYST EXCISION Left 1997 approx    CARPAL TUNNEL RELEASE Right 2012 approx    CATARACT EXTRACTION Bilateral 1994    CHOLECYSTECTOMY  1975    COLONOSCOPY N/A  11/26/2013    [facility] Familia Santo [facility] at [facility]    CORNEAL TRANSPLANT Bilateral 08/2015 8/2015 - left; Right 4/2016    EYE SURGERY      Fuch's Corneal dystrophy - had 2nd operation with Dr. Quintanilla    EYE SURGERY      Cataract wIOL    left thumb Left 2011    removed cuboid for arthritis    REVERSE TOTAL SHOULDER ARTHROPLASTY Left 8/21/2018    Procedure: ARTHROPLASTY, SHOULDER, TOTAL, REVERSE;  Surgeon: Solomon Lujan MD;  Location: [facility] OR;  Service: Orthopedics;  Laterality: Left;  WITH BICEP TENODESIS    SLT- OS (aka TRABECULOPLASTY) Left 05/11/2011    repeat 3/14/12    TENOTOMY Left 8/21/2018    Procedure: TENOTOMY;  Surgeon: Solomon Lujan MD;  Location: [facility] OR;  Service: Orthopedics;  Laterality: Left;    TENOTOMY Left 8/21/2018    [facility] at [facility] OR    TONSILLECTOMY  1938     Family History   Problem Relation Age of Onset    Heart disease Mother     Hypertension Mother     Cancer Father 88        sarcoma( ?Kaposi's ) on leg    Deep vein thrombosis Neg Hx     Ovarian cancer Neg Hx     Breast cancer Neg Hx     Kidney disease Neg Hx     Strabismus Neg Hx     Retinal detachment Neg Hx     Macular degeneration Neg Hx     Glaucoma Neg Hx     Blindness Neg Hx     Amblyopia Neg Hx     Eczema Neg Hx     Lupus Neg Hx     Melanoma Neg Hx     Psoriasis Neg Hx     Diabetes Neg Hx     Stroke Neg Hx     Mental retardation Neg Hx     Mental illness Neg Hx     Hyperlipidemia Neg Hx     COPD Neg Hx     Asthma Neg Hx     Depression Neg Hx     Alcohol abuse Neg Hx     Drug abuse Neg Hx      Social History     Socioeconomic History    Marital status:      Spouse name: Not on file    Number of children: Not on file    Years of education: Not on file    Highest education level: Not on file   Social Needs    Financial resource strain: Not on file    Food insecurity - worry: Not on file    Food insecurity - inability: Not on file     Transportation needs - medical: Not on file    Transportation needs - non-medical: Not on file   Occupational History    Not on file   Tobacco Use    Smoking status: Never Smoker    Smokeless tobacco: Never Used    Tobacco comment: history of passive smoking from her ex-   Substance and Sexual Activity    Alcohol use: Yes     Alcohol/week: 1.2 oz     Types: 2 Standard drinks or equivalent per week     Comment: occasional     Drug use: No    Sexual activity: Not Currently     Partners: Male     Birth control/protection: Post-menopausal     Comment: discussed protection for STD's   Other Topics Concern    Are you pregnant or think you may be? No    Breast-feeding No   Social History Narrative     x 2,  x 1. Retired artist - previously doing jewelry/wall pieces; ; lives in [facility]; has 3 sons - 52( lives in [place], 54, and 56;exercises minimally - limited due to back issues. Still drives. Does have a Living Will.        Medication List           Accurate as of 9/5/18 11:59 PM. If you have any questions, ask your nurse or doctor.               CONTINUE taking these medications    acetaminophen 500 MG tablet  Commonly known as:  TYLENOL     ALPHA LIPOIC ACID ORAL     ALPRAZolam 0.5 MG tablet  Commonly known as:  XANAX  TAKE 1/2 - 1 TABLET BY MOUTH NIGHTLY FOR SLEEP OR ANXIETY     B complex-vitamin C-folic acid 0.8 mg Tab  Commonly known as:  LEVY-REG/NEPHRO-REG     BOSWELLIA ALSHAWN XT (BULK) MISC     CALCIUM CITRATE ORAL     cloNIDine 0.1 MG tablet  Commonly known as:  CATAPRES  Take 1 tablet (0.1 mg total) by mouth 2 (two) times daily.     coenzyme Q10 200 mg capsule     fish oil-omega-3 fatty acids 300-1,000 mg capsule     losartan 50 MG tablet  Commonly known as:  COZAAR  Take 1 tablet (50 mg total) by mouth 2 (two) times daily.     LOTEMAX 0.5 % Drpg  Generic drug:  loteprednol etabonate     metoprolol succinate 25 MG 24 hr tablet  Commonly known as:   TOPROL-XL  Take 2 tablets (50 mg total) by mouth once daily.     MIRALAX ORAL     * netarsudil 0.02 % Drop  Commonly known as:  RHOPRESSA  Place 1 drop into the left eye every evening.     * RHOPRESSA 0.02 % Drop  Generic drug:  netarsudil  Place 1 drop into both eyes every evening.     nozaseptin nasal   Commonly known as:  NOZIN  1 each by Each Nare route 2 (two) times daily.     ondansetron 4 MG Tbdl  Commonly known as:  ZOFRAN-ODT  Take 2 tablets (8 mg total) by mouth every 8 (eight) hours as needed.     oxyCODONE-acetaminophen 7.5-325 mg per tablet  Commonly known as:  PERCOCET  Take 1 tablet by mouth every 4 (four) hours as needed for Pain. If needed, take 1-2 tablets every 4-8 hrs for severe pain.     pyridoxine (vitamin B6) 100 MG Tab  Commonly known as:  B-6     travoprost 0.004 % ophthalmic solution  Commonly known as:  TRAVATAN Z  Place 1 drop into the left eye every evening.     triamcinolone acetonide 0.1% 0.1 % cream  Commonly known as:  KENALOG  Apply topically 2 (two) times daily. For up to one week as needed     triamterene-hydrochlorothiazide 37.5-25 mg 37.5-25 mg per capsule  Commonly known as:  DYAZIDE     TURMERIC ROOT EXTRACT ORAL     VITAMIN D3 2,000 unit Cap  Generic drug:  cholecalciferol (vitamin D3)         * This list has 2 medication(s) that are the same as other medications prescribed for you. Read the directions carefully, and ask your doctor or other care provider to review them with you.              Review of patient's allergies indicates:   Allergen Reactions    Claritin [loratadine] Other (See Comments)     Double vision/spots    Hydrocodone-acetaminophen      wheezing    Naproxen      wheezing    Vibramycin [doxycycline calcium] Other (See Comments)     Weakness nausea   sob    Amlodipine      Myalgias      Coreg [carvedilol] Swelling     lips    Fenofibrate      Causes muscle weakness    Hydralazine analogues      Muscle tremors/aches      Isosorbide     Lasix  [furosemide]      myalgias    Moxifloxacin Other (See Comments)     Hives   muscle soreness    Timolol     Allegra [fexofenadine] Other (See Comments)     Double vision/spots     Codeine Diarrhea and Other (See Comments)     Loss of balance     Erythromycin Other (See Comments)     Complete weakness/could not walk    Hydrocortisone (bulk) Other (See Comments)     Only suppository/ caused muscle weakness    Macrolide antibiotics Other (See Comments)     Complete weakness/could not walk    Patanol [olopatadine] Other (See Comments)     Muscle weakness    Prednisone Anxiety    Statins-hmg-coa reductase inhibitors Other (See Comments)     Muscle weakness    Xalatan [latanoprost] Other (See Comments)     Muscle weakness     Review of Systems   Constitution: Negative for fever.   HENT: Negative for sore throat.    Eyes: Negative for blurred vision.   Cardiovascular: Negative for dyspnea on exertion.   Respiratory: Negative for shortness of breath.    Hematologic/Lymphatic: Does not bruise/bleed easily.   Skin: Negative for itching.   Musculoskeletal: Positive for joint pain, muscle cramps, muscle weakness, myalgias and stiffness. Negative for back pain, falls and neck pain.   Gastrointestinal: Negative for vomiting.   Genitourinary: Negative for dysuria.   Neurological: Negative for dizziness and numbness.   Psychiatric/Behavioral: The patient does not have insomnia.        Objective:   Body mass index is 28.67 kg/m².  Vitals:    09/05/18 1408   BP: 118/70   Pulse: 97   Resp: 18   Temp: 97.3 °F (36.3 °C)                       Left Shoulder Exam     Other   Sensation: normal     Comments:  Incision c/d/i  No SOI  SLTI RUM Ax  +AIN PIN INT  Staples removed  steris applied        Vascular Exam       Capillary Refill  Left Hand: normal capillary refill       EXAMINATION:  XR SHOULDER COMPLETE 2 OR MORE VIEWS LEFT    CLINICAL HISTORY:  Pain, unspecified    TECHNIQUE:  Two or three views of the left shoulder were  preformed.    COMPARISON:  08/21/2018    FINDINGS:  A reverse left shoulder prosthesis is noted.  Skin staples are in place.  Air within the soft tissues has resolved.  No hardware failure or loosening.   Impression       1.  As above      Electronically signed by: Ugo Ramos DO  Date: 09/05/2018  Time: 14:11     Radiographs / Imaging : Results reviewed by me and interpreted by me, discussed with the patient and / or family       Assessment:     Encounter Diagnosis   Name Primary?    Primary osteoarthritis of left shoulder Yes         Plan:      -nwb left shoulder  In sling other than for hygiene and elbow and wrist ROM  Docusate for constipation, miralax, senna  Hospital bed  Staples out  steris on

## 2018-09-06 ENCOUNTER — TELEPHONE (OUTPATIENT)
Dept: ORTHOPEDICS | Facility: CLINIC | Age: 83
End: 2018-09-06

## 2018-09-06 ENCOUNTER — PATIENT MESSAGE (OUTPATIENT)
Dept: ORTHOPEDICS | Facility: CLINIC | Age: 83
End: 2018-09-06

## 2018-09-06 DIAGNOSIS — Z98.890 STATUS POST SURGERY: Primary | ICD-10-CM

## 2018-09-06 RX ORDER — POLYETHYLENE GLYCOL 3350 17 G/17G
17 POWDER, FOR SOLUTION ORAL DAILY
Qty: 1 BOTTLE | Refills: 0 | Status: CANCELLED | OUTPATIENT
Start: 2018-09-06

## 2018-09-06 RX ORDER — SENNOSIDES 8.6 MG/1
1 TABLET ORAL DAILY
Status: CANCELLED | COMMUNITY
Start: 2018-09-06

## 2018-09-07 ENCOUNTER — PATIENT MESSAGE (OUTPATIENT)
Dept: ORTHOPEDICS | Facility: CLINIC | Age: 83
End: 2018-09-07

## 2018-09-09 ENCOUNTER — PATIENT MESSAGE (OUTPATIENT)
Dept: INTERNAL MEDICINE | Facility: CLINIC | Age: 83
End: 2018-09-09

## 2018-09-10 DIAGNOSIS — Z96.612 STATUS POST TOTAL SHOULDER ARTHROPLASTY, LEFT: Primary | ICD-10-CM

## 2018-09-10 DIAGNOSIS — Z96.612 STATUS POST REVERSE ARTHROPLASTY OF LEFT SHOULDER: ICD-10-CM

## 2018-09-13 ENCOUNTER — TELEPHONE (OUTPATIENT)
Dept: ADMINISTRATIVE | Facility: CLINIC | Age: 83
End: 2018-09-13

## 2018-09-13 NOTE — TELEPHONE ENCOUNTER
Home Health SOC 08/24/2018 - 10/22/2018 with [facility] ([place] [facility] - Dr. Isaias Lujan. SN and HHA services.

## 2018-09-17 ENCOUNTER — OFFICE VISIT (OUTPATIENT)
Dept: OPHTHALMOLOGY | Facility: CLINIC | Age: 83
End: 2018-09-17
Payer: MEDICARE

## 2018-09-17 DIAGNOSIS — H40.1134 PRIMARY OPEN ANGLE GLAUCOMA OF BOTH EYES, INDETERMINATE STAGE: Primary | ICD-10-CM

## 2018-09-17 PROCEDURE — 99999 PR PBB SHADOW E&M-EST. PATIENT-LVL I: CPT | Mod: PBBFAC,,, | Performed by: OPTOMETRIST

## 2018-09-17 PROCEDURE — 92012 INTRM OPH EXAM EST PATIENT: CPT | Mod: S$PBB,,, | Performed by: OPTOMETRIST

## 2018-09-17 PROCEDURE — 99211 OFF/OP EST MAY X REQ PHY/QHP: CPT | Mod: PBBFAC,PO | Performed by: OPTOMETRIST

## 2018-09-20 ENCOUNTER — TELEPHONE (OUTPATIENT)
Dept: ORTHOPEDICS | Facility: CLINIC | Age: 83
End: 2018-09-20

## 2018-09-20 NOTE — TELEPHONE ENCOUNTER
Home health nurse stated her insurance is denying pt after next week. I informed the home health nurse I would speak to dr. freyder nurse to get another order in for her. I asked the home health when is her last visit he stated Tuesday. I told him we should be contacting her with another order for PT. [facility] will tell the pt.        ----- Message from Rhonda Wilson sent at 9/20/2018  3:10 PM CDT -----  Contact: Mauricio Purvis- [facility]  Mauricio is calling in regards to pt insurance is denying pt after next next week. Mauricio states the pt will have to be discharged from [facility] after next week. Please call Mauricio back at [phone].    Thanks,   Rhonda Wilson

## 2018-09-23 ENCOUNTER — PATIENT MESSAGE (OUTPATIENT)
Dept: ORTHOPEDICS | Facility: CLINIC | Age: 83
End: 2018-09-23

## 2018-09-25 ENCOUNTER — PATIENT MESSAGE (OUTPATIENT)
Dept: ORTHOPEDICS | Facility: CLINIC | Age: 83
End: 2018-09-25

## 2018-09-27 ENCOUNTER — PATIENT MESSAGE (OUTPATIENT)
Dept: ORTHOPEDICS | Facility: CLINIC | Age: 83
End: 2018-09-27

## 2018-10-01 ENCOUNTER — TELEPHONE (OUTPATIENT)
Dept: ORTHOPEDICS | Facility: CLINIC | Age: 83
End: 2018-10-01

## 2018-10-01 NOTE — TELEPHONE ENCOUNTER
After speaking with pt she provided me with number for [facility]- [phone].     Spoke with Irene from [facility] that stated the part of the patients PT is not approved and that she would have to ask her home health agency to re-request more visits or call the appeals number that was provided on her denial letter.     Informed pt of what insurance said. She said she was told that the DR would have to do change codes but I informed her that in order for us to change/PA she would have to start the appeal.     Pt verbalized understanding.

## 2018-10-01 NOTE — TELEPHONE ENCOUNTER
----- Message from Kristina Martin sent at 10/1/2018  2:02 PM CDT -----  Contact: Pt  Pt called and stated that she has left several messages requesting a nurse to call her and she has not received a call back She stated that it is regarding a wrong diagnosis code and her insurance coverage. She can be reached at [phone] or [phone].    Thanks,  TF

## 2018-10-10 ENCOUNTER — OFFICE VISIT (OUTPATIENT)
Dept: ORTHOPEDICS | Facility: CLINIC | Age: 83
End: 2018-10-10
Payer: MEDICARE

## 2018-10-10 VITALS
HEART RATE: 86 BPM | DIASTOLIC BLOOD PRESSURE: 69 MMHG | SYSTOLIC BLOOD PRESSURE: 140 MMHG | WEIGHT: 167 LBS | BODY MASS INDEX: 28.51 KG/M2 | HEIGHT: 64 IN

## 2018-10-10 DIAGNOSIS — Z96.612 STATUS POST TOTAL SHOULDER ARTHROPLASTY, LEFT: Primary | ICD-10-CM

## 2018-10-10 PROCEDURE — 99499 UNLISTED E&M SERVICE: CPT | Mod: HCNC,S$GLB,, | Performed by: ORTHOPAEDIC SURGERY

## 2018-10-10 PROCEDURE — 99024 POSTOP FOLLOW-UP VISIT: CPT | Mod: ,,, | Performed by: ORTHOPAEDIC SURGERY

## 2018-10-10 PROCEDURE — 99214 OFFICE O/P EST MOD 30 MIN: CPT | Mod: PBBFAC | Performed by: ORTHOPAEDIC SURGERY

## 2018-10-10 PROCEDURE — 99999 PR PBB SHADOW E&M-EST. PATIENT-LVL IV: CPT | Mod: PBBFAC,,, | Performed by: ORTHOPAEDIC SURGERY

## 2018-10-10 NOTE — PROGRESS NOTES
Subjective:     Patient ID: Shirley Metz is a 85 y.o. female.    Chief Complaint: Pain of the Left Shoulder    She is post op 6 weeks left reverse total shoulder arthroplasty. Pain is controlled. 4/10 today. She is upset that some home therapy visits were not covered by her insurance.      Shoulder Pain    The pain is present in the left shoulder. This is a new problem. The current episode started more than 1 month ago. The problem occurs intermittently. The problem has been gradually improving. The quality of the pain is described as sharp, aching and numbing. The pain is at a severity of 4/10. Associated symptoms include an inability to bear weight, a limited range of motion and stiffness. Pertinent negatives include no fever, itching or numbness. The symptoms are aggravated by activity, bearing weight, lying down, lifting and contact. She has tried OTC pain meds, OTC ointments, heat and brace/corset for the symptoms. The treatment provided mild relief. Physical therapy was ineffective.      Past Medical History:   Diagnosis Date    Anxiety 11/28/2017    Arthritis     Back pain     Basal cell carcinoma 03/29/2018    left mid back    Colon polyps     reported per patient.     Fuchs' corneal dystrophy     General anesthetics causing adverse effect in therapeutic use     woke up during surgery and gagged on ET tube    Glaucoma     Heart failure with preserved left ventricular function (HFpEF) 7/24/2018    Hyperlipidemia     Hypertension     Macular degeneration dry    Obesity     Trouble in sleeping     Urinary tract infection      Past Surgical History:   Procedure Laterality Date    ADENOIDECTOMY  1938    ARTHROPLASTY, SHOULDER, TOTAL, REVERSE Left 8/21/2018    [facility] at [facility]    BREAST BIOPSY Left     1997. benign    BREAST CYST EXCISION Left 1997 approx    CARPAL TUNNEL RELEASE Right 2012 approx    CATARACT EXTRACTION Bilateral 1994     CHOLECYSTECTOMY  1975    COLONOSCOPY N/A 11/26/2013    Performed by Familia Santo MD at [facility] ENDO    CORNEAL TRANSPLANT Bilateral 08/2015 8/2015 - left; Right 4/2016    EYE SURGERY      Fuch's Corneal dystrophy - had 2nd operation with Dr. Quintanilla    EYE SURGERY      Cataract wIOL    left thumb Left 2011    removed cuboid for arthritis    REVERSE TOTAL SHOULDER ARTHROPLASTY Left 8/21/2018    Procedure: ARTHROPLASTY, SHOULDER, TOTAL, REVERSE;  Surgeon: Solomon Lujan MD;  Location: [facility] OR;  Service: Orthopedics;  Laterality: Left;  WITH BICEP TENODESIS    SLT- OS (aka TRABECULOPLASTY) Left 05/11/2011    repeat 3/14/12    TENOTOMY Left 8/21/2018    Procedure: TENOTOMY;  Surgeon: Solomon Lujan MD;  Location: [facility] OR;  Service: Orthopedics;  Laterality: Left;    TENOTOMY Left 8/21/2018    Performed by [facility] at [facility] OR    TONSILLECTOMY  1938     Family History   Problem Relation Age of Onset    Heart disease Mother     Hypertension Mother     Cancer Father 88        sarcoma( ?[place]'s ) on leg    Deep vein thrombosis Neg Hx     Ovarian cancer Neg Hx     Breast cancer Neg Hx     Kidney disease Neg Hx     Strabismus Neg Hx     Retinal detachment Neg Hx     Macular degeneration Neg Hx     Glaucoma Neg Hx     Blindness Neg Hx     Amblyopia Neg Hx     Eczema Neg Hx     Lupus Neg Hx     Melanoma Neg Hx     Psoriasis Neg Hx     Diabetes Neg Hx     Stroke Neg Hx     Mental retardation Neg Hx     Mental illness Neg Hx     Hyperlipidemia Neg Hx     COPD Neg Hx     Asthma Neg Hx     Depression Neg Hx     Alcohol abuse Neg Hx     Drug abuse Neg Hx      Social History     Socioeconomic History    Marital status:      Spouse name: Not on file    Number of children: Not on file    Years of education: Not on file    Highest education level: Not on file   Social Needs    Financial resource strain: Not on file    Food insecurity - worry: Not on file    Food  insecurity - inability: Not on file    Transportation needs - medical: Not on file    Transportation needs - non-medical: Not on file   Occupational History    Not on file   Tobacco Use    Smoking status: Never Smoker    Smokeless tobacco: Never Used    Tobacco comment: history of passive smoking from her ex-   Substance and Sexual Activity    Alcohol use: Yes     Alcohol/week: 1.2 oz     Types: 2 Standard drinks or equivalent per week     Comment: occasional     Drug use: No    Sexual activity: Not Currently     Partners: Male     Birth control/protection: Post-menopausal     Comment: discussed protection for STD's   Other Topics Concern    Are you pregnant or think you may be? No    Breast-feeding No   Social History Narrative     x 2,  x 1. Retired artist - previously doing jewelry/wall pieces; ; lives in [facility]; has 3 sons - 52( lives in [place], 54, and 56;exercises minimally - limited due to back issues. Still drives. Does have a Living Will.        Medication List           Accurate as of 10/10/18 11:59 PM. If you have any questions, ask your nurse or doctor.               CONTINUE taking these medications    acetaminophen 500 MG tablet  Commonly known as:  TYLENOL     ALPHA LIPOIC ACID ORAL     ALPRAZolam 0.5 MG tablet  Commonly known as:  XANAX  TAKE 1/2 - 1 TABLET BY MOUTH NIGHTLY FOR SLEEP OR ANXIETY     B complex-vitamin C-folic acid 0.8 mg Tab  Commonly known as:  LEVY-REG/NEPHRO-REG     BOSWELLIA LASHAWN XT (BULK) MISC     CALCIUM CITRATE ORAL     cloNIDine 0.1 MG tablet  Commonly known as:  CATAPRES  Take 1 tablet (0.1 mg total) by mouth 2 (two) times daily.     coenzyme Q10 200 mg capsule     fish oil-omega-3 fatty acids 300-1,000 mg capsule     losartan 50 MG tablet  Commonly known as:  COZAAR  Take 1 tablet (50 mg total) by mouth 2 (two) times daily.     LOTEMAX 0.5 % Drpg  Generic drug:  loteprednol etabonate     MIRALAX ORAL     *  netarsudil 0.02 % Drop  Commonly known as:  RHOPRESSA  Place 1 drop into the left eye every evening.     * RHOPRESSA 0.02 % Drop  Generic drug:  netarsudil  Place 1 drop into both eyes every evening.     pyridoxine (vitamin B6) 100 MG Tab  Commonly known as:  B-6     travoprost 0.004 % ophthalmic solution  Commonly known as:  TRAVATAN Z  Place 1 drop into the left eye every evening.     triamterene-hydrochlorothiazide 37.5-25 mg 37.5-25 mg per capsule  Commonly known as:  DYAZIDE     VITAMIN D3 2,000 unit Cap  Generic drug:  cholecalciferol (vitamin D3)         * This list has 2 medication(s) that are the same as other medications prescribed for you. Read the directions carefully, and ask your doctor or other care provider to review them with you.            STOP taking these medications    metoprolol succinate 25 MG 24 hr tablet  Commonly known as:  TOPROL-XL  Stopped by:  Solomon Lujan MD     nozaseptin nasal   Commonly known as:  NOZIN  Stopped by:  Solomon Lujan MD     ondansetron 4 MG Tbdl  Commonly known as:  ZOFRAN-ODT  Stopped by:  Solomon Lujan MD     oxyCODONE-acetaminophen 7.5-325 mg per tablet  Commonly known as:  PERCOCET  Stopped by:  Solomon Lujan MD     triamcinolone acetonide 0.1% 0.1 % cream  Commonly known as:  KENALOG  Stopped by:  Solomon Lujan MD     TURMERIC ROOT EXTRACT ORAL  Stopped by:  Solomon Lujan MD          Review of patient's allergies indicates:   Allergen Reactions    Claritin [loratadine] Other (See Comments)     Double vision/spots    Hydrocodone-acetaminophen      wheezing    Naproxen      wheezing    Vibramycin [doxycycline calcium] Other (See Comments)     Weakness nausea   sob    Amlodipine      Myalgias      Coreg [carvedilol] Swelling     lips    Fenofibrate      Causes muscle weakness    Hydralazine analogues      Muscle tremors/aches      Isosorbide     Lasix [furosemide]      myalgias    Moxifloxacin Other (See Comments)      Hives   muscle soreness    Timolol     Allegra [fexofenadine] Other (See Comments)     Double vision/spots     Codeine Diarrhea and Other (See Comments)     Loss of balance     Erythromycin Other (See Comments)     Complete weakness/could not walk    Hydrocortisone (bulk) Other (See Comments)     Only suppository/ caused muscle weakness    Macrolide antibiotics Other (See Comments)     Complete weakness/could not walk    Patanol [olopatadine] Other (See Comments)     Muscle weakness    Prednisone Anxiety    Statins-hmg-coa reductase inhibitors Other (See Comments)     Muscle weakness    Xalatan [latanoprost] Other (See Comments)     Muscle weakness     Review of Systems   Constitution: Negative for fever.   HENT: Negative for sore throat.    Eyes: Negative for blurred vision.   Cardiovascular: Negative for dyspnea on exertion.   Respiratory: Negative for shortness of breath.    Hematologic/Lymphatic: Does not bruise/bleed easily.   Skin: Negative for itching.   Musculoskeletal: Positive for joint pain, muscle cramps, muscle weakness, myalgias and stiffness. Negative for back pain, falls and neck pain.   Gastrointestinal: Negative for vomiting.   Genitourinary: Negative for dysuria.   Neurological: Negative for dizziness and numbness.   Psychiatric/Behavioral: The patient does not have insomnia.        Objective:   Body mass index is 28.67 kg/m².  Vitals:    10/10/18 1349   BP: (!) 140/69   Pulse: 86                       Left Shoulder Exam     Other   Sensation: normal     Comments:  Incision healing well, no SOI    SLTI RUM AX  Deltoid firiing  Formal ROM not tested      Vascular Exam       Capillary Refill  Left Hand: normal capillary refill           Assessment:     Encounter Diagnosis   Name Primary?    Status post total shoulder arthroplasty, left Yes    Left shoulder avascular necrosis    Plan:     -doing well post reverse total shoulder  -PT ochsner protocol left shoulder post reverse total  shoulder arthroplasty  Did explain to Ms. Bettencourt that I will do whatever I can to help her get her visits covered by her insurance, but I did explain that I am just the physician here, and not the insurance company / medicare  She does need home health visits  No more than 1 lb lifting left shoulder

## 2018-10-18 ENCOUNTER — OFFICE VISIT (OUTPATIENT)
Dept: INTERNAL MEDICINE | Facility: CLINIC | Age: 83
End: 2018-10-18
Payer: MEDICARE

## 2018-10-18 VITALS
BODY MASS INDEX: 28.67 KG/M2 | HEART RATE: 79 BPM | SYSTOLIC BLOOD PRESSURE: 139 MMHG | DIASTOLIC BLOOD PRESSURE: 80 MMHG | OXYGEN SATURATION: 98 % | TEMPERATURE: 98 F | WEIGHT: 167 LBS

## 2018-10-18 DIAGNOSIS — N18.30 CKD (CHRONIC KIDNEY DISEASE) STAGE 3, GFR 30-59 ML/MIN: ICD-10-CM

## 2018-10-18 DIAGNOSIS — I10 ESSENTIAL HYPERTENSION: Primary | ICD-10-CM

## 2018-10-18 DIAGNOSIS — Z23 FLU VACCINE NEED: ICD-10-CM

## 2018-10-18 LAB
ANION GAP SERPL CALC-SCNC: 10 MMOL/L
BASOPHILS # BLD AUTO: 0.07 K/UL
BASOPHILS NFR BLD: 1 %
BUN SERPL-MCNC: 32 MG/DL
CALCIUM SERPL-MCNC: 10.2 MG/DL
CHLORIDE SERPL-SCNC: 105 MMOL/L
CO2 SERPL-SCNC: 25 MMOL/L
CREAT SERPL-MCNC: 1.3 MG/DL
DIFFERENTIAL METHOD: ABNORMAL
EOSINOPHIL # BLD AUTO: 0.2 K/UL
EOSINOPHIL NFR BLD: 3.3 %
ERYTHROCYTE [DISTWIDTH] IN BLOOD BY AUTOMATED COUNT: 13.2 %
EST. GFR  (AFRICAN AMERICAN): 43.2 ML/MIN/1.73 M^2
EST. GFR  (NON AFRICAN AMERICAN): 37.5 ML/MIN/1.73 M^2
GLUCOSE SERPL-MCNC: 87 MG/DL
HCT VFR BLD AUTO: 36.9 %
HGB BLD-MCNC: 11.5 G/DL
IMM GRANULOCYTES # BLD AUTO: 0.01 K/UL
IMM GRANULOCYTES NFR BLD AUTO: 0.1 %
LYMPHOCYTES # BLD AUTO: 1.7 K/UL
LYMPHOCYTES NFR BLD: 25.3 %
MCH RBC QN AUTO: 31.4 PG
MCHC RBC AUTO-ENTMCNC: 31.2 G/DL
MCV RBC AUTO: 101 FL
MONOCYTES # BLD AUTO: 0.9 K/UL
MONOCYTES NFR BLD: 12.4 %
NEUTROPHILS # BLD AUTO: 4 K/UL
NEUTROPHILS NFR BLD: 57.9 %
NRBC BLD-RTO: 0 /100 WBC
PLATELET # BLD AUTO: 262 K/UL
PMV BLD AUTO: 10.2 FL
POTASSIUM SERPL-SCNC: 4.4 MMOL/L
RBC # BLD AUTO: 3.66 M/UL
SODIUM SERPL-SCNC: 140 MMOL/L
WBC # BLD AUTO: 6.87 K/UL

## 2018-10-18 PROCEDURE — 99999 PR PBB SHADOW E&M-EST. PATIENT-LVL III: CPT | Mod: PBBFAC,,, | Performed by: FAMILY MEDICINE

## 2018-10-18 PROCEDURE — 3075F SYST BP GE 130 - 139MM HG: CPT | Mod: CPTII,,, | Performed by: FAMILY MEDICINE

## 2018-10-18 PROCEDURE — 85025 COMPLETE CBC W/AUTO DIFF WBC: CPT

## 2018-10-18 PROCEDURE — 3079F DIAST BP 80-89 MM HG: CPT | Mod: CPTII,,, | Performed by: FAMILY MEDICINE

## 2018-10-18 PROCEDURE — 99213 OFFICE O/P EST LOW 20 MIN: CPT | Mod: PBBFAC,PO,25 | Performed by: FAMILY MEDICINE

## 2018-10-18 PROCEDURE — 90662 IIV NO PRSV INCREASED AG IM: CPT | Mod: PBBFAC,PO

## 2018-10-18 PROCEDURE — 1101F PT FALLS ASSESS-DOCD LE1/YR: CPT | Mod: CPTII,,, | Performed by: FAMILY MEDICINE

## 2018-10-18 PROCEDURE — 99213 OFFICE O/P EST LOW 20 MIN: CPT | Mod: S$PBB,,, | Performed by: FAMILY MEDICINE

## 2018-10-18 PROCEDURE — 80048 BASIC METABOLIC PNL TOTAL CA: CPT

## 2018-10-18 RX ORDER — TRIAMTERENE AND HYDROCHLOROTHIAZIDE 37.5; 25 MG/1; MG/1
1 CAPSULE ORAL DAILY
Qty: 90 CAPSULE | Refills: 3 | Status: SHIPPED | OUTPATIENT
Start: 2018-10-18 | End: 2019-03-12 | Stop reason: ALTCHOICE

## 2018-10-18 NOTE — PROGRESS NOTES
"Subjective:       Patient ID: Shirley Metz is a 85 y.o. female.    Chief Complaint: Hypertension    Here for the three-month recheck hypertension.  At her last visit her instructions were to "Hold only the metoprolol if you have a "low" blood pressure. Consider taking 1/2 the metoprolol instead of none at all at these times. Please continue to take the losartan twice a day."  Intake BP is 140/75. She has hx of holding meds for "low" BPs.    She states she had her left shoulder surgery end of August. States prior to surgery she was getting acupuncture and sts her BP was down so she stopped her metoprolol about 3 weeks prior to surgery. States she has had two acupuncture txs since surgery. She brings in her numbers and they range from 119/52 to 143/64. Ranges generally in the 130s/60s.    Losartan 50 BID, clonidine 0.1 1/2 qHS, triam/hctz 37.5/25 qAM (did not take this this AM however) still being takien dialy - having trouble with sleep - frequent awakening. Right shoulder still hurts, and there is pain into left upper arm but not shoulder joint.      Review of Systems   Constitutional: Positive for activity change. Negative for unexpected weight change.   HENT: Negative for hearing loss, rhinorrhea and trouble swallowing.    Eyes: Positive for visual disturbance. Negative for discharge.   Respiratory: Negative for chest tightness and wheezing.    Cardiovascular: Negative for chest pain and palpitations.   Gastrointestinal: Positive for constipation (improving). Negative for blood in stool, diarrhea and vomiting.   Endocrine: Positive for polyuria. Negative for polydipsia.   Genitourinary: Positive for frequency (nocturia several times nightly). Negative for difficulty urinating, dysuria, hematuria and menstrual problem.   Musculoskeletal: Positive for neck pain. Negative for arthralgias and joint swelling.   Neurological: Positive for weakness. Negative for headaches.   Psychiatric/Behavioral: Negative " for confusion and dysphoric mood.       Objective:      Physical Exam   Constitutional: She is oriented to person, place, and time. She appears well-developed.   HENT:   Head: Normocephalic and atraumatic.   Cardiovascular: Normal rate, regular rhythm and normal heart sounds.   Pulmonary/Chest: Effort normal and breath sounds normal.   Musculoskeletal: She exhibits no edema.   Neurological: She is alert and oriented to person, place, and time.   Skin: Skin is warm and dry.   Psychiatric: She has a normal mood and affect. Her behavior is normal.         Assessment/Plan:     1. Essential hypertension  triamterene-hydrochlorothiazide 37.5-25 mg (DYAZIDE) 37.5-25 mg per capsule    Basic metabolic panel    CBC auto differential   2. Flu vaccine need  Influenza - High Dose (65+) (PF) (IM)   3. CKD (chronic kidney disease) stage 3, GFR 30-59 ml/min       She declines PNV/prevnar.   No smart phone - so no digital HTN program.  Recheck 6 months - same meds. BP much improved. Ok to stay off metoprolol for now but may want to get back on low dose metoprolol for cardiac protection in future. Using clonidine in tiny dose qHS and it may help her sleep - will not D/C.

## 2018-10-24 ENCOUNTER — CLINICAL SUPPORT (OUTPATIENT)
Dept: REHABILITATION | Facility: HOSPITAL | Age: 83
End: 2018-10-24
Attending: ORTHOPAEDIC SURGERY
Payer: MEDICARE

## 2018-10-24 DIAGNOSIS — Z96.612 STATUS POST TOTAL SHOULDER ARTHROPLASTY, LEFT: Primary | ICD-10-CM

## 2018-10-24 PROCEDURE — G8984 CARRY CURRENT STATUS: HCPCS | Mod: CK

## 2018-10-24 PROCEDURE — G8985 CARRY GOAL STATUS: HCPCS | Mod: CI

## 2018-10-24 PROCEDURE — 97110 THERAPEUTIC EXERCISES: CPT

## 2018-10-24 PROCEDURE — 97161 PT EVAL LOW COMPLEX 20 MIN: CPT

## 2018-10-24 NOTE — PLAN OF CARE
PHYSICAL THERAPY INITIAL OUTPATIENT EVALUATION    Referring Provider:  Dr. Solomon Lujan    Diagnosis:       ICD-10-CM ICD-9-CM    1. Status post total shoulder arthroplasty, left Z96.612 V43.61             Orders:  Evaluate and Treat    Date of Initial Evaluation: 10-24-18      Visit # 1     SUBJECTIVE:    Pt. Had previous physical therapy months ago however reports that pain has continued in L shoulder resulting in surgery.  Pt. S/p L shoulder open biceps tenotomy and L reverse TSA on 8-21-18.  Patient reports dx with finding a cyst in L humerus causing surgery to be more intense. Pt. Reports being in immobilizer about 8 weeks.  Pt. Reports pain occurs across B shoulder blades.  Pt. Reports lots of edema in L forearm and fingers after surgery but decreased with treatment from home health PT.  Pt. reports of skin irritation in L forearm that is finally now resolving.   Pt. Reports having accupuncture prior surgery which helped lower BP and decreased L shoulder pain.    Current Pain: 4 /10, across upper arm and forearm and into hand  Worse pain: sometimes uses tape      OBJECTIVE:    Sensation: decreased sensation medial and lateral forearm and hand            L  Shoulder A/PROM: Flexion     65 pain      Extension   WNL      Abduction   60 pain      Adduction   Lack 35 degrees      Internal Rotation  WNL neutral      External Rotation  0 degrees neutral    L elbow flex: 115  L shoulder int. Rot functional reach L hip     R UE AROM: R UE ROM 75%  Flexion and abduction, WNL ext. And int. Rot              R shoulder int. Rot functional reach  T 8             L   Strength:  Supraspinatus   1+/5      Subscapularis   1+/5     Latissimus dorsi   NT     Rhomboids   NT     Upper Trapezius  NT     Middle Trapezius  NT     Lower Trapezius  1+/5     Levator scapula  NT     Biceps    NT     Triceps   NT     Flexors    1+/5     Abductors   1+/5           STT (selective tension test)- NT    Special test:  NT    GHJ mobility:  pain at end range of all planes of movement  AC mobility: NT  ST mobility: NT      Tenderness to palpation:  NT      Quick DASH Shoulder Questionnaire           Score 1-5  1. Opening a tight jar       4/5  2. Do heavy household chores     NA  3. Carry a shopping bag or briefcase     4/5  4. Wash your back      3/5  5. Use a knife to cut food     3/5  6. Recreactional activities requiring force   5/5  7. Social Limitation      4/5  8. Work/ADL limitation      3/5  9. Arm, Shoulder or hand Pain    3/5  10. Tingling       2/5  11. Sleeping Limitation      2/5         Function: Patient reports 57.5% disability based on score of the Quick DASH.    Functional Limitations and Goal:   This patient's primary Physical Therapy goal is to improve his current level of function(carrying,moving and handling objects ) with minimal limitations. The patient's current level of impairment is 40-60% Impaired(CK) based on their score of 57.5% impaired on the Quick DASH. The Patient is expected to achieve a score of 1-20%(CI) within 10 treatments.    ASSESSMENT:  The patient is a 85 y.o. year old female who presents to physical therapy s/p L shoulder open biceps tenotomy and L reverse TSA.  Patient's impairments include decreased L shoulder AROM, significant weakness in L UE, decreased sensation L forearm and L hand.  These impairments are limiting patient's ability to open jars, lift or carry objects, wash back, cut food, social activities, and sleeping.  Patient's prognosis is good.  Patient will benefit from skilled physical therapy intervention to increase L shoulder ROM and strength to perform usual activities.    Co-morbidities which may impact the plan of care and potentially impede the patient's progress in therapy include:  Patients CLINICAL PRESENTATION is STABLE. Pt. Has multiple comorbidities.  Pt. Had 2 body systems examined.  Pt. Has low complexity eval.     Short Term Goals:  1-4 weeks  1. I with HEP  2.60% or greater  and pain free  Shoulder AROM     Long Term Goals: 5-12 weeks  1. Return to activities without complains of pain and dysfunction  2. Increase L shoulder/ UE strength to 4+/5 to perform lifting and carrying activities.  3. Increase L shoulder AROM to 75% or greater to reach into overhead cabinet.      TREATMENT PROVIDED:  -Manual Therapy:  L shoulder PROM  X 5 min.  -Therapeutic Exercise: shoulder shrugs x 2min., shoulder protraction/ retraction x 2min., shoulder circumduction x 2 min., AAROM with wand: flex x 2min., abd x 2 min., ext. Rot x 2 min.; int. Rot stretch with belt x 1 min., scap retraction x 2 min., isometrics: flex x 2min., abd x 2min., ext. Rot x 2min., int. Rot x 2 min., add x 2min.  -Modalities: N/A  -Evaluation completed  -Education on HEP and importance of performing HEP, condition and posture, activity modification    PLAN:  Patient will benefit from physical therapy (2-3) x/week for (10-11) weeks including manual therapy, therapeutic exercise, functional activities, modalities, and patient education.    Thank you for this referral.    These services are reasonable and necessary for the conditions set forth above while under my care.

## 2018-10-26 ENCOUNTER — TELEPHONE (OUTPATIENT)
Dept: ORTHOPEDICS | Facility: CLINIC | Age: 83
End: 2018-10-26

## 2018-10-26 NOTE — TELEPHONE ENCOUNTER
Called and spoke to pt to inform of location CHANGE with appointment on 12/10/2018 with Dr. Lujan to [facility]. Pt verbally understood.

## 2018-10-29 ENCOUNTER — CLINICAL SUPPORT (OUTPATIENT)
Dept: REHABILITATION | Facility: HOSPITAL | Age: 83
End: 2018-10-29
Attending: ORTHOPAEDIC SURGERY
Payer: MEDICARE

## 2018-10-29 DIAGNOSIS — M15.9 PRIMARY OSTEOARTHRITIS INVOLVING MULTIPLE JOINTS: ICD-10-CM

## 2018-10-29 DIAGNOSIS — Z96.612 STATUS POST TOTAL SHOULDER ARTHROPLASTY, LEFT: Primary | ICD-10-CM

## 2018-10-29 DIAGNOSIS — M25.512 CHRONIC LEFT SHOULDER PAIN: ICD-10-CM

## 2018-10-29 DIAGNOSIS — G89.29 CHRONIC LEFT SHOULDER PAIN: ICD-10-CM

## 2018-10-29 PROCEDURE — 97140 MANUAL THERAPY 1/> REGIONS: CPT

## 2018-10-29 PROCEDURE — 97110 THERAPEUTIC EXERCISES: CPT

## 2018-10-29 NOTE — PROGRESS NOTES
PHYSICAL THERAPY Daily Note    Referring Provider:  Dr. Solomon Lujan    Diagnosis:       ICD-10-CM ICD-9-CM    1. Status post total shoulder arthroplasty, left Z96.612 V43.61    2. Primary osteoarthritis involving multiple joints M15.0 715.09    3. Chronic left shoulder pain M25.512 719.41     G89.29 338.29             Orders:  Evaluate and Treat    Date of Initial Evaluation: 10-24-18      Visit # 2    SUBJECTIVE:    Pt. Reports performing HEP. Pt. Reports pain occurred one night while lying down.    Current Pain: not reported today    OBJECTIVE:      ASSESSMENT:  Pt. Tolerated manual therapy well with pain occurring at end range of all planes of motion.  Pt. Noted to have improved ROM in all planes of motion with only moderate ROM limits in flexion and abduction now.  Pt. Still limited 75% in ext. And int. Rot and hor. Add.  Cont. POC.      TREATMENT PROVIDED:  -Manual Therapy:  L shoulder PROM  X 5 min., L shoulder joint mob x 4 min.   -Therapeutic Exercise: one on one 14 min., supervised: 11 min.: shoulder shrugs x 2min., shoulder protraction/ retraction x 2min., shoulder circumduction x 2 min., AAROM with wand: flex x 2min., abd x 2 min., ext. Rot x 2 min.; int. Rot stretch with belt x 1 min., scap retraction x 2 min., isometrics: flex x 2min., abd x 2min., ext. Rot x 2min., int. Rot x 2 min., add x 2min.  -Modalities: N/A  -Education on HEP and importance of performing HEP, condition and posture, activity modification    PLAN:  Patient will benefit from physical therapy (2-3) x/week for (10-11) weeks including manual therapy, therapeutic exercise, functional activities, modalities, and patient education.    Thank you for this referral.    These services are reasonable and necessary for the conditions set forth above while under my care.

## 2018-11-01 ENCOUNTER — CLINICAL SUPPORT (OUTPATIENT)
Dept: REHABILITATION | Facility: HOSPITAL | Age: 83
End: 2018-11-01
Attending: ORTHOPAEDIC SURGERY
Payer: MEDICARE

## 2018-11-01 DIAGNOSIS — Z96.612 STATUS POST TOTAL SHOULDER ARTHROPLASTY, LEFT: Primary | ICD-10-CM

## 2018-11-01 PROCEDURE — 97110 THERAPEUTIC EXERCISES: CPT

## 2018-11-01 NOTE — PROGRESS NOTES
PHYSICAL THERAPY Daily Note    Referring Provider:  Dr. Solomon Lujan    Diagnosis:       ICD-10-CM ICD-9-CM    1. Status post total shoulder arthroplasty, left Z96.612 V43.61             Orders:  Evaluate and Treat    Date of Initial Evaluation: 10-24-18      Visit # 3    SUBJECTIVE:    Pt. Reports that she's only able to sleep about 2.5 hours at a time secondary shoulder discomfort (L).  Pt. Reports pain in L elbow mostly with driving.       Current Pain: not reported today    OBJECTIVE:      ASSESSMENT: Pt. Has about 75% ROM passively in flexion and abduction. Pt. Has almost 50% AROM L shoulder flexion and abduction.  Pt. Has 50%  Passive or AROM ext. And int. Rot and hor. Add. Pt. More compliant with HEP and seems to remember exercises well.  Cont. POC to increase strength and ROM to maximum potential in L shoulder.    TREATMENT PROVIDED:  -Manual Therapy:  L shoulder PROM  X 5 min.  -Therapeutic Exercise: one on one 27 min.: shoulder shrugs x 2min., shoulder protraction/ retraction x 2min., shoulder circumduction x 2 min., int. Rot x 2 min., ext. Rot x 2 min.; int. Rot stretch with belt x 2 min., scap retraction x 2 min., isometrics: flex x 2min., abd x 2min., ext. Rot x 2min., int. Rot x 2 min., add x 2min., AROM x 5 min.  -Modalities: N/A     PLAN:  Patient will benefit from physical therapy (2-3) x/week for (10-11) weeks including manual therapy, therapeutic exercise, functional activities, modalities, and patient education.    Thank you for this referral.    These services are reasonable and necessary for the conditions set forth above while under my care.

## 2018-11-05 ENCOUNTER — CLINICAL SUPPORT (OUTPATIENT)
Dept: REHABILITATION | Facility: HOSPITAL | Age: 83
End: 2018-11-05
Attending: ORTHOPAEDIC SURGERY
Payer: MEDICARE

## 2018-11-05 DIAGNOSIS — Z96.612 STATUS POST TOTAL SHOULDER ARTHROPLASTY, LEFT: Primary | ICD-10-CM

## 2018-11-05 PROCEDURE — 97140 MANUAL THERAPY 1/> REGIONS: CPT

## 2018-11-05 PROCEDURE — 97110 THERAPEUTIC EXERCISES: CPT

## 2018-11-05 NOTE — PROGRESS NOTES
PHYSICAL THERAPY Daily Note    Referring Provider:  Dr. Solomon Lujan    Diagnosis:       ICD-10-CM ICD-9-CM    1. Status post total shoulder arthroplasty, left Z96.612 V43.61             Orders:  Evaluate and Treat    Date of Initial Evaluation: 10-24-18      Visit # 4    SUBJECTIVE:    Pt. Reports that her L shoulder has been good.  Pt.. Reports that she takes Arnica for pain at night.    Current Pain: not reported today    OBJECTIVE:      ASSESSMENT: Pt. Cont. To have in L shoulder AROM in all planes.  Pt. Tolerates PROM to shoulder in all planes well.  Pt. Is reaching about  50% AROM L shoulder flexion and abduction.  Pt. Has 75% PROM in all planes.  Pt. Had muscle tension L biceps- manual STM resolved tension mildly.  Cont. POC.    TREATMENT PROVIDED:  -Manual Therapy:  L shoulder PROM  X 5 min., STM x 6 min., L biceps  -Therapeutic Exercise: one on one 12min., supervised 13min.: shoulder shrugs x 2min.,  int. Rot x 2 min., ext. Rot x 2 min.; int. Rot stretch with belt x 2 min., scap retraction x 2 min., isometrics: flex x 2min., abd x 2min., ext. Rot x 2min., int. Rot x 2 min., add x 2min., AROM x 5 min.  -Modalities: N/A     PLAN:  Patient will benefit from physical therapy (2-3) x/week for (10-11) weeks including manual therapy, therapeutic exercise, functional activities, modalities, and patient education.    Thank you for this referral.    These services are reasonable and necessary for the conditions set forth above while under my care.

## 2018-11-09 ENCOUNTER — CLINICAL SUPPORT (OUTPATIENT)
Dept: REHABILITATION | Facility: HOSPITAL | Age: 83
End: 2018-11-09
Attending: ORTHOPAEDIC SURGERY
Payer: MEDICARE

## 2018-11-09 DIAGNOSIS — Z96.612 STATUS POST TOTAL SHOULDER ARTHROPLASTY, LEFT: Primary | ICD-10-CM

## 2018-11-09 PROCEDURE — 97140 MANUAL THERAPY 1/> REGIONS: CPT | Mod: HCNC

## 2018-11-09 NOTE — PROGRESS NOTES
PHYSICAL THERAPY Daily Note    Referring Provider:  Dr. Solomon Lujan    Diagnosis:       ICD-10-CM ICD-9-CM    1. Status post total shoulder arthroplasty, left Z96.612 V43.61             Orders:  Evaluate and Treat    Date of Initial Evaluation: 10-24-18      Visit # 5    SUBJECTIVE:    Pt. Reports not having to take Arnica every night now.  Pt. Reports tolerating sleeping better.    Current Pain: not reported today    OBJECTIVE:      ASSESSMENT: Pt. Passively shows increase in L shoulder ROM.  Pt. Not noted to have increase in L shoulder AROM this visit.  Pt. Has little pain reported with L shoulder ROM.  Pt.'s muscle tension in L biceps decreased with STM today.  Will begin more scapula strengthening next week.  Cont. POC.    TREATMENT PROVIDED:  -Manual Therapy:  L shoulder PROM  X 5 min., STM x 8 min., L biceps  -Therapeutic Exercise:  supervised 25 min.: shoulder shrugs x 2min.,  int. Rot x 2 min., ext. Rot x 2 min.; int. Rot stretch with belt x 2 min., scap retraction x 2 min., isometrics: flex x 2min., abd x 2min., ext. Rot x 2min., int. Rot x 2 min., add x 2min., AROM x 5 min.  -Modalities: N/A     PLAN:  Patient will benefit from physical therapy (2-3) x/week for (10-11) weeks including manual therapy, therapeutic exercise, functional activities, modalities, and patient education.    Thank you for this referral.    These services are reasonable and necessary for the conditions set forth above while under my care.

## 2018-11-12 DIAGNOSIS — I10 ESSENTIAL HYPERTENSION: Primary | ICD-10-CM

## 2018-11-13 ENCOUNTER — CLINICAL SUPPORT (OUTPATIENT)
Dept: CARDIOLOGY | Facility: CLINIC | Age: 83
End: 2018-11-13
Payer: MEDICARE

## 2018-11-13 ENCOUNTER — CLINICAL SUPPORT (OUTPATIENT)
Dept: REHABILITATION | Facility: HOSPITAL | Age: 83
End: 2018-11-13
Attending: ORTHOPAEDIC SURGERY
Payer: MEDICARE

## 2018-11-13 ENCOUNTER — OFFICE VISIT (OUTPATIENT)
Dept: CARDIOLOGY | Facility: CLINIC | Age: 83
End: 2018-11-13
Payer: MEDICARE

## 2018-11-13 VITALS
BODY MASS INDEX: 29.28 KG/M2 | DIASTOLIC BLOOD PRESSURE: 70 MMHG | HEIGHT: 64 IN | SYSTOLIC BLOOD PRESSURE: 136 MMHG | HEART RATE: 76 BPM | WEIGHT: 171.5 LBS

## 2018-11-13 DIAGNOSIS — I70.0 CALCIFICATION OF AORTA: Chronic | ICD-10-CM

## 2018-11-13 DIAGNOSIS — I49.3 PVC'S (PREMATURE VENTRICULAR CONTRACTIONS): ICD-10-CM

## 2018-11-13 DIAGNOSIS — N18.30 CHRONIC KIDNEY DISEASE, STAGE 3: Chronic | ICD-10-CM

## 2018-11-13 DIAGNOSIS — I10 ESSENTIAL HYPERTENSION: Chronic | ICD-10-CM

## 2018-11-13 DIAGNOSIS — I50.30 HEART FAILURE WITH PRESERVED LEFT VENTRICULAR FUNCTION (HFPEF): ICD-10-CM

## 2018-11-13 DIAGNOSIS — Z96.612 STATUS POST TOTAL SHOULDER ARTHROPLASTY, LEFT: Primary | ICD-10-CM

## 2018-11-13 DIAGNOSIS — I11.9 HYPERTENSIVE LEFT VENTRICULAR HYPERTROPHY, WITHOUT HEART FAILURE: Chronic | ICD-10-CM

## 2018-11-13 DIAGNOSIS — I10 ESSENTIAL HYPERTENSION: ICD-10-CM

## 2018-11-13 DIAGNOSIS — E78.49 OTHER HYPERLIPIDEMIA: Chronic | ICD-10-CM

## 2018-11-13 DIAGNOSIS — I51.89 DIASTOLIC DYSFUNCTION WITHOUT HEART FAILURE: Primary | Chronic | ICD-10-CM

## 2018-11-13 PROCEDURE — 1101F PT FALLS ASSESS-DOCD LE1/YR: CPT | Mod: CPTII,HCNC,S$GLB, | Performed by: INTERNAL MEDICINE

## 2018-11-13 PROCEDURE — 93000 ELECTROCARDIOGRAM COMPLETE: CPT | Mod: HCNC,S$GLB,, | Performed by: INTERNAL MEDICINE

## 2018-11-13 PROCEDURE — 3078F DIAST BP <80 MM HG: CPT | Mod: CPTII,HCNC,S$GLB, | Performed by: INTERNAL MEDICINE

## 2018-11-13 PROCEDURE — 3075F SYST BP GE 130 - 139MM HG: CPT | Mod: CPTII,HCNC,S$GLB, | Performed by: INTERNAL MEDICINE

## 2018-11-13 PROCEDURE — 97110 THERAPEUTIC EXERCISES: CPT | Mod: HCNC

## 2018-11-13 PROCEDURE — 99999 PR PBB SHADOW E&M-EST. PATIENT-LVL III: CPT | Mod: PBBFAC,HCNC,, | Performed by: INTERNAL MEDICINE

## 2018-11-13 PROCEDURE — 99214 OFFICE O/P EST MOD 30 MIN: CPT | Mod: HCNC,S$GLB,, | Performed by: INTERNAL MEDICINE

## 2018-11-13 NOTE — PROGRESS NOTES
PHYSICAL THERAPY Daily Note    Referring Provider:  Dr. Solomon Lujan    Diagnosis:       ICD-10-CM ICD-9-CM    1. Status post total shoulder arthroplasty, left Z96.612 V43.61             Orders:  Evaluate and Treat    Date of Initial Evaluation: 10-24-18      Visit # 6    SUBJECTIVE:   Pt. Reports that her L shoulder is feeling good.  Pt. Reports most limited with reaching L hand behind back.  Pt. Educated on holding stretch longer for internal rotation functional reach.      Current Pain: not reported today    OBJECTIVE:      ASSESSMENT: Pt. Progressing with all L shoulder PROM.  L shoulder AROM  Flex, abd, and horizontal adduction about 50%.  Pt. Has only slightly decreased L shoulder external rotation.  Advanced pt. To internal rotation strengthening with theraband.  Pt. Has good ROM with ext. Rot in abd but significantly limited in neutral.  Began middle and lower trapezius strengthening.  Pt. Limited with L lower trapezius strengthening secondary decrease flexion.       TREATMENT PROVIDED:  -Manual Therapy:  L shoulder PROM  X 5 min.  -Therapeutic Exercise:    One on one 23min.:   int. Rot x 2 min., ext. Rot x 2 min.; int. Rot stretch with belt x 2 min., scap retraction x 2 min., middle trap x 2 min., lower trap x 2min., isometrics: flex x 2min., abd x 2min., ext. Rot x 2min.,, add x 2min., AROM x 5 min.  -Modalities: N/A     PLAN:  Patient will benefit from physical therapy (2-3) x/week for (10-11) weeks including manual therapy, therapeutic exercise, functional activities, modalities, and patient education.    Thank you for this referral.    These services are reasonable and necessary for the conditions set forth above while under my care.

## 2018-11-13 NOTE — SUBJECTIVE & OBJECTIVE
Interval History: Patient stable overnight with improved blood pressure. Patient denies any chest pain or SOB. Cardiology following. Stress test in AM.     Review of Systems   Constitutional: Positive for fatigue. Negative for chills, diaphoresis and fever.   HENT: Negative.  Negative for congestion, nosebleeds and sinus pressure.    Eyes: Negative.  Negative for photophobia, redness and visual disturbance.   Respiratory: Positive for shortness of breath. Negative for cough, chest tightness and wheezing.    Cardiovascular: Negative.  Negative for chest pain, palpitations and leg swelling.   Gastrointestinal: Negative.  Negative for abdominal pain, diarrhea, nausea and vomiting.   Endocrine: Negative.  Negative for polydipsia, polyphagia and polyuria.   Genitourinary: Negative.  Negative for dysuria, flank pain, frequency and urgency.   Musculoskeletal: Negative.  Negative for back pain, joint swelling and neck stiffness.   Skin: Negative.  Negative for color change, pallor and rash.   Allergic/Immunologic: Negative.  Negative for environmental allergies, food allergies and immunocompromised state.   Neurological: Negative for dizziness, syncope, speech difficulty, numbness and headaches.   Hematological: Negative.  Negative for adenopathy. Does not bruise/bleed easily.   Psychiatric/Behavioral: Negative.  Negative for confusion, decreased concentration and hallucinations. The patient is not nervous/anxious.    All other systems reviewed and are negative.    Objective:     Vital Signs (Most Recent):  Temp: 98.3 °F (36.8 °C) (05/14/17 0341)  Pulse: 75 (05/14/17 0848)  Resp: 18 (05/14/17 0848)  BP: (!) 163/79 (05/14/17 0848)  SpO2: 97 % (05/14/17 0848) Vital Signs (24h Range):  Temp:  [98.1 °F (36.7 °C)-98.7 °F (37.1 °C)] 98.3 °F (36.8 °C)  Pulse:  [60-75] 75  Resp:  [18-20] 18  SpO2:  [94 %-98 %] 97 %  BP: (119-201)/(64-84) 163/79     Weight: 81.6 kg (180 lb)  Body mass index is 30.9 kg/(m^2).    Intake/Output Summary  (Last 24 hours) at 05/14/17 1003  Last data filed at 05/14/17 0600   Gross per 24 hour   Intake              360 ml   Output              800 ml   Net             -440 ml      Physical Exam   Constitutional: She is oriented to person, place, and time. She appears well-developed and well-nourished. No distress.   HENT:   Head: Normocephalic and atraumatic.   Eyes: Conjunctivae and EOM are normal. No scleral icterus.   Neck: Normal range of motion. Neck supple. No JVD present. No tracheal deviation present. No thyromegaly present.   Cardiovascular: Normal rate, regular rhythm, normal heart sounds and intact distal pulses.    No murmur heard.  Pulmonary/Chest: Effort normal and breath sounds normal. No respiratory distress. She has no wheezes. She has no rales. She exhibits no tenderness.   Abdominal: Soft. Bowel sounds are normal. She exhibits no distension. There is no tenderness.   Musculoskeletal: Normal range of motion. She exhibits no edema or tenderness.   Lymphadenopathy:     She has no cervical adenopathy.   Neurological: She is alert and oriented to person, place, and time. No cranial nerve deficit. She exhibits normal muscle tone. Coordination normal.   Skin: Skin is warm and dry. She is not diaphoretic. No erythema.   Psychiatric: She has a normal mood and affect. Her behavior is normal. Judgment and thought content normal.   Nursing note and vitals reviewed.      Significant Labs:   CBC:   Recent Labs  Lab 05/13/17  1655 05/14/17  0550   WBC 6.04 7.59   HGB 12.2 11.7*   HCT 36.2* 34.8*    194     CMP:   Recent Labs  Lab 05/13/17  1655 05/14/17  0550    141   K 4.0 3.7    108   CO2 23 25   GLU 99 110   BUN 25* 22   CREATININE 1.2 1.1   CALCIUM 9.3 9.2   PROT 7.2 6.6   ALBUMIN 3.9 3.7   BILITOT 0.3 0.7   ALKPHOS 62 55   AST 21 18   ALT 26 22   ANIONGAP 10 8   EGFRNONAA 42* 46*     Cardiac Markers:   Recent Labs  Lab 05/13/17  1655   *     All pertinent labs within the past 24 hours  have been reviewed.    Significant Imaging:   Imaging Results         X-Ray Chest PA And Lateral (Final result) Result time:  05/13/17 17:12:44    Final result by Lay Quinonez MD (05/13/17 17:12:44)    Impression:      Cardiomegaly.  No acute findings.      Electronically signed by: LAY QUINONEZ MD  Date:     05/13/17  Time:    17:12     Narrative:    Exam: XR CHEST PA AND LATERAL,    Date:  05/13/17 17:03:50    History: Shortness of breath.    Comparison:  01/09/2017.    Findings: Cardiomegaly is evident.  The lung fields appear clear with no obvious failure.             98.2

## 2018-11-13 NOTE — PROGRESS NOTES
Subjective:   Patient ID:  Shirley Metz is a 85 y.o. female who presents for cardiac consult of Hypertension (6 mth f/u)      HPI  The patient came in today for cardiac consult of Hypertension (6 mth f/u)    Ms. Flower is an 85 year old female patient with HTN, hyperlipidemia, palpitations presents today for follow-up.    5/1/18  She has significant side effects from many BP meds, could not tolerate Verapamil recently. BP has improved, but sometimes BP gets too low - occasionally 119/53.   She thinks her BP is too low under 130 systolic and wants to decrease some meds. Discussed we can half the clonidine patch and monitor. Will continue other meds for now.  She has a good log with BPs daily. Overall BP is well controlled now. Tries to eat low salt diet for the most part but is at Nursing home and can't always control the diet.   Other main issue is her arm pain due to shoulder pain will see pain management specialist Dr. Chin.     5/24/18  Has a lot of pain in both arms, will be seeing Dr. Chin and then may need surgery by Dr. Lujan. Reviewed BP log - mostly 130s-140s, once 96/58. Occasional palpitations - usually with waking up or sleeping.     6/11/18  Pt has been getting painful rash at site of clonidine. Also has an infection possibly at left shoulder where procedure happened for shoulders. She is also seeing surgery today for shoulder pain. Pt also feels very weak due to clonidine and has trouble getting up with 0.2 mg patch. Last night BP went up to 190/71 and took a clonidine .1 mg PO dose which improved BP. Bp mildly elevated today but also in a lot of pain.     7/24/18  Pt is scheduled for shoulder surgery on shoulder Aug 21st. She has been getting accupuncture which has helped to walk. She has stopped clonidine patch is taking pills BID/TID. BP overall have been well controlled, 140-150s/50-60s. Otherwise feels ok.     11/13/18  She is s/p L shoulder surgery currently undergoing PT.  Pt had reverse joint repair/surgery due to a cyst in the joint/bone. Has another month of rehab at least. BP overall has been in 130s/60s. No further dizziness, has stopped metoprolol.   ECG - NSR    Patient feels no chest pain, no sob, no leg swelling, no PND,  no dizziness, no syncope, no CNS symptoms.    Patient is compliant with medications.    2D ECHO 3/2018  CONCLUSIONS     1 - Concentric hypertrophy.     2 - No wall motion abnormalities.     3 - Normal left ventricular systolic function (EF 55-60%).     4 - Normal left ventricular diastolic function.     5 - Normal right ventricular systolic function .     6 - Mild tricuspid regurgitation.       Past Medical History:   Diagnosis Date    Anxiety 11/28/2017    Arthritis     Back pain     Basal cell carcinoma 03/29/2018    left mid back    Colon polyps     reported per patient.     Fuchs' corneal dystrophy     General anesthetics causing adverse effect in therapeutic use     woke up during surgery and gagged on ET tube    Glaucoma     Heart failure with preserved left ventricular function (HFpEF) 7/24/2018    Hyperlipidemia     Hypertension     Macular degeneration dry    Obesity     Trouble in sleeping     Urinary tract infection        Past Surgical History:   Procedure Laterality Date    ADENOIDECTOMY  1938    ARTHROPLASTY, SHOULDER, TOTAL, REVERSE Left 8/21/2018    [facility]    BREAST BIOPSY Left     1997. benign    BREAST CYST EXCISION Left 1997 approx    CARPAL TUNNEL RELEASE Right 2012 approx    CATARACT EXTRACTION Bilateral 1994    CHOLECYSTECTOMY  1975    COLONOSCOPY N/A 11/26/2013    Performed by Familia Santo MD at [facility] ENDO    CORNEAL TRANSPLANT Bilateral 08/2015 8/2015 - left; Right 4/2016    EYE SURGERY      Fuch's Corneal dystrophy - had 2nd operation with Dr. Quintanilla    EYE SURGERY      Cataract wIOL    left thumb Left 2011    removed cuboid for arthritis    REVERSE TOTAL  SHOULDER ARTHROPLASTY Left 8/21/2018    Procedure: ARTHROPLASTY, SHOULDER, TOTAL, REVERSE;  Surgeon: Solomon Lujan MD;  Location: [facility] OR;  Service: Orthopedics;  Laterality: Left;  WITH BICEP TENODESIS    SLT- OS (aka TRABECULOPLASTY) Left 05/11/2011    repeat 3/14/12    TENOTOMY Left 8/21/2018    Procedure: TENOTOMY;  Surgeon: Solomon Lujan MD;  Location: [facility] OR;  Service: Orthopedics;  Laterality: Left;    TENOTOMY Left 8/21/2018    Performed by [facility]jan MD at [facility] OR    TONSILLECTOMY  1938       Social History     Tobacco Use    Smoking status: Never Smoker    Smokeless tobacco: Never Used    Tobacco comment: history of passive smoking from her ex-   Substance Use Topics    Alcohol use: Yes     Alcohol/week: 1.2 oz     Types: 2 Standard drinks or equivalent per week     Comment: occasional     Drug use: No       Family History   Problem Relation Age of Onset    Heart disease Mother     Hypertension Mother     Cancer Father 88        sarcoma( ?[place]'s ) on leg    Deep vein thrombosis Neg Hx     Ovarian cancer Neg Hx     Breast cancer Neg Hx     Kidney disease Neg Hx     Strabismus Neg Hx     Retinal detachment Neg Hx     Macular degeneration Neg Hx     Glaucoma Neg Hx     Blindness Neg Hx     Amblyopia Neg Hx     Eczema Neg Hx     Lupus Neg Hx     Melanoma Neg Hx     Psoriasis Neg Hx     Diabetes Neg Hx     Stroke Neg Hx     Mental retardation Neg Hx     Mental illness Neg Hx     Hyperlipidemia Neg Hx     COPD Neg Hx     Asthma Neg Hx     Depression Neg Hx     Alcohol abuse Neg Hx     Drug abuse Neg Hx           Medication List           Accurate as of 11/13/18 11:36 AM. If you have any questions, ask your nurse or doctor.               CHANGE how you take these medications    cloNIDine 0.1 MG tablet  Commonly known as:  CATAPRES  Take 1 tablet (0.1 mg total) by mouth 2 (two) times daily.  What changed:    · how much to take  · when to take  this        CONTINUE taking these medications    acetaminophen 500 MG tablet  Commonly known as:  TYLENOL     ALPHA LIPOIC ACID ORAL     ALPRAZolam 0.5 MG tablet  Commonly known as:  XANAX  TAKE 1/2 - 1 TABLET BY MOUTH NIGHTLY FOR SLEEP OR ANXIETY     B complex-vitamin C-folic acid 0.8 mg Tab  Commonly known as:  LEVY-REG/NEPHRO-REG     BOSWELLIA LASHAWN XT (BULK) MISC     CALCIUM CITRATE ORAL     coenzyme Q10 200 mg capsule     fish oil-omega-3 fatty acids 300-1,000 mg capsule     losartan 50 MG tablet  Commonly known as:  COZAAR  Take 1 tablet (50 mg total) by mouth 2 (two) times daily.     LOTEMAX 0.5 % Drpg  Generic drug:  loteprednol etabonate     MIRALAX ORAL     * netarsudil 0.02 % Drop  Commonly known as:  RHOPRESSA  Place 1 drop into the left eye every evening.     * RHOPRESSA 0.02 % Drop  Generic drug:  netarsudil  Place 1 drop into both eyes every evening.     pyridoxine (vitamin B6) 100 MG Tab  Commonly known as:  B-6     travoprost 0.004 % ophthalmic solution  Commonly known as:  TRAVATAN Z  Place 1 drop into the left eye every evening.     triamterene-hydrochlorothiazide 37.5-25 mg 37.5-25 mg per capsule  Commonly known as:  DYAZIDE  Take 1 capsule by mouth once daily.     VITAMIN D3 2,000 unit Cap  Generic drug:  cholecalciferol (vitamin D3)         * This list has 2 medication(s) that are the same as other medications prescribed for you. Read the directions carefully, and ask your doctor or other care provider to review them with you.                Review of Systems   Constitutional: Negative.    HENT: Negative.    Eyes: Negative.    Respiratory: Negative.    Cardiovascular: Negative for leg swelling.   Gastrointestinal: Negative.    Genitourinary: Negative.    Musculoskeletal: Positive for joint pain.   Skin: Negative for rash.   Neurological: Negative for dizziness.   Endo/Heme/Allergies: Negative.    Psychiatric/Behavioral: Negative.    All 12 systems otherwise negative.      Wt Readings from  "Last 3 Encounters:   11/13/18 77.8 kg (171 lb 8.3 oz)   10/18/18 75.8 kg (166 lb 16 oz)   10/10/18 75.8 kg (167 lb)     Temp Readings from Last 3 Encounters:   10/18/18 98.2 °F (36.8 °C) (Tympanic)   09/05/18 97.3 °F (36.3 °C)   08/23/18 98.5 °F (36.9 °C) (Oral)     BP Readings from Last 3 Encounters:   11/13/18 136/70   10/18/18 139/80   10/10/18 (!) 140/69     Pulse Readings from Last 3 Encounters:   11/13/18 76   10/18/18 79   10/10/18 86       /70   Pulse 76   Ht 5' 4" (1.626 m)   Wt 77.8 kg (171 lb 8.3 oz)   BMI 29.44 kg/m²     Objective:   Physical Exam   Constitutional: She is oriented to person, place, and time. She appears well-developed and well-nourished. No distress.   HENT:   Head: Normocephalic and atraumatic.   Nose: Nose normal.   Mouth/Throat: Oropharynx is clear and moist.   Eyes: Conjunctivae and EOM are normal. No scleral icterus.   Neck: Normal range of motion. Neck supple. No JVD present. No thyromegaly present.   Cardiovascular: Normal rate, regular rhythm, S1 normal and S2 normal. Exam reveals no gallop, no S3, no S4 and no friction rub.   No murmur heard.  Pulmonary/Chest: Effort normal and breath sounds normal. No stridor. No respiratory distress. She has no wheezes. She has no rales. She exhibits no tenderness.   Abdominal: Soft. Bowel sounds are normal. She exhibits no distension and no mass. There is no tenderness. There is no rebound.   Genitourinary:   Genitourinary Comments: Deferred   Musculoskeletal: Normal range of motion. She exhibits no edema, tenderness or deformity.   Lymphadenopathy:     She has no cervical adenopathy.   Neurological: She is alert and oriented to person, place, and time. She exhibits normal muscle tone. Coordination normal.   Skin: Skin is warm and dry. Rash (on anterior chest - 2x2 square of clonide) noted. She is not diaphoretic. There is erythema. No pallor.   Psychiatric: She has a normal mood and affect. Her behavior is normal. Judgment and " thought content normal.   Nursing note and vitals reviewed.      Lab Results   Component Value Date     10/18/2018    K 4.4 10/18/2018     10/18/2018    CO2 25 10/18/2018    BUN 32 (H) 10/18/2018    CREATININE 1.3 10/18/2018    GLU 87 10/18/2018    HGBA1C 5.7 (H) 03/16/2018    MG 1.9 03/16/2018    AST 18 07/24/2018    ALT 14 07/24/2018    ALBUMIN 3.8 07/24/2018    PROT 7.8 07/24/2018    BILITOT 0.5 07/24/2018    WBC 6.87 10/18/2018    HGB 11.5 (L) 10/18/2018    HCT 36.9 (L) 10/18/2018     (H) 10/18/2018     10/18/2018    INR 1.0 01/25/2018    TSH 1.871 01/04/2017    CHOL 180 03/16/2018    HDL 43 03/16/2018    LDLCALC 117.6 03/16/2018    TRIG 97 03/16/2018     (H) 03/15/2018     Assessment:      1. Diastolic dysfunction without heart failure    2. Calcification of aorta    3. Hypertensive left ventricular hypertrophy, without heart failure    4. Essential hypertension    5. Other hyperlipidemia    6. Heart failure with preserved left ventricular function (HFpEF)    7. PVC's (premature ventricular contractions)    8. Chronic kidney disease, stage 3        Plan:   1. HTN - well controlled overall  - cont current meds   - pt has numerous side effects to almost all other BP meds - norvasc, coreg, verapamil, BB  - cont low salt diet    2. HLD  - cont low manuel diet    3. Diastolc HF  - pt euvolemic  - cont low salt diet    4. Palpitations  - no further episdoes  - off BB     5. CKD3  - cont to monitor    Thank you for allowing me to participate in this patient's care. Please do not hesitate to contact me with any questions or concerns. Consult note has been forwarded to the referral physician.

## 2018-11-15 ENCOUNTER — OFFICE VISIT (OUTPATIENT)
Dept: OPHTHALMOLOGY | Facility: CLINIC | Age: 83
End: 2018-11-15
Payer: MEDICARE

## 2018-11-15 DIAGNOSIS — Z94.7 HISTORY OF CORNEA TRANSPLANT: ICD-10-CM

## 2018-11-15 DIAGNOSIS — Z96.1 PSEUDOPHAKIA: ICD-10-CM

## 2018-11-15 DIAGNOSIS — H40.1134 PRIMARY OPEN ANGLE GLAUCOMA OF BOTH EYES, INDETERMINATE STAGE: Primary | ICD-10-CM

## 2018-11-15 PROCEDURE — 99999 PR PBB SHADOW E&M-EST. PATIENT-LVL II: CPT | Mod: PBBFAC,HCNC,, | Performed by: OPHTHALMOLOGY

## 2018-11-15 PROCEDURE — 92012 INTRM OPH EXAM EST PATIENT: CPT | Mod: HCNC,S$GLB,, | Performed by: OPHTHALMOLOGY

## 2018-11-15 PROCEDURE — 92133 CPTRZD OPH DX IMG PST SGM ON: CPT | Mod: HCNC,S$GLB,, | Performed by: OPHTHALMOLOGY

## 2018-11-15 NOTE — PROGRESS NOTES
PHYSICAL THERAPY Daily Note    Referring Provider:  Dr. Solomon Lujan    Diagnosis:       ICD-10-CM ICD-9-CM    1. Status post total shoulder arthroplasty, left Z96.612 V43.61             Orders:  Evaluate and Treat    Date of Initial Evaluation: 10-24-18      Visit # 7    SUBJECTIVE:  Pt. Has no complaints.  Pt. Reports more tenderness L thumb after wearing shoulder immobilizer.      Current Pain: not reported today    OBJECTIVE:      ASSESSMENT: Pt. Cont. To have increase in L shoulder PROM in all planes of motion.  Pt. Tolerate PROM well.  Pt. Still limited about  50% with L shoulder flexion and abduction but showed increased speed with performing AROM.  Pt. Had mild muscle tension in L Prox. Biceps tendon that resolved mildly with STM. Pt. Educated on biceps and shoulder extensor strengthening.  Pt. Began UBE x 2 min., and able to perform full revolutions forward and backward.  Cont. POC.      TREATMENT PROVIDED:  -Manual Therapy:  L shoulder PROM  X 5 min.  -Therapeutic Exercise:    One on one 28min.:   int. Rot x 2 min., ext. Rot x 2 min.; int. Rot stretch with belt x 2 min., middle trap x 2 min., lower trap x 2min., isometrics: flex x 2min., abd x 2min., ext. Rot x 2min., add x 2min., AROM x 5 min., bicep curls x 2min., shoulder ext x 2 min. UBE x 2 min.   -Modalities: N/A     PLAN:  Patient will benefit from physical therapy (2-3) x/week for (10-11) weeks including manual therapy, therapeutic exercise, functional activities, modalities, and patient education.    Thank you for this referral.    These services are reasonable and necessary for the conditions set forth above while under my care.

## 2018-11-15 NOTE — PROGRESS NOTES
SUBJECTIVE:   Shirley Metz is a 85 y.o. female   Corrected distance visual acuity was 20/30 +2 in the right eye and 20/40 +2 in the left eye.   Chief Complaint   Patient presents with    Glaucoma        HPI:  HPI     Patient saw TRF  On 09/17/18 for iop check, patient returns today for iop   check patient  states she is 100% compliant with drop usage, patient will   get dilated next visit.        Lives at [facility] sx on 8/21/18    1. COAG Goal < 19  SLT OD 3/04 (20 to 15) + 3/11 (19 to 15)  SLT OS 5/05 (20 to 14) +5/11 (18-19 to 15) + 3/12 (min resp.) + 3/11/15   (20.5-17.5) + 3/7/18 (24- 21)  (Cosopt, Xalatan, and Timolol cause Myalgias)  (Alphagan causes redness) (zioptan too expensive)  2. PCIOL OU  3. Dry AMD  4. Fuch's Dystrophy   DSAEK OD 4/16 Dr. Quintanilla (followed by [facility] every summer)  DSAEK OS 8/15 Dr. Quintanilla  5. Dry Eye -intolerance to Restasis       Lotemax Mon, Wed, Fri and Sun once daily OU  Systane 4-5 times daily  Travatan Z qhs os  Rhopressa qhs OS             Last edited by RACHELLE Sanchez on 11/15/2018  9:51 AM. (History)        Assessment /Plan :  1. Primary open angle glaucoma of both eyes, indeterminate stage   Doing well, IOP OD within acceptable range relative to target IOP and no evidence of progression. Continue current treatment. Reviewed importance of continued compliance with treatment and follow up.    IOP OS not within acceptable range relative to target IOP with risk of irreversible visual loss. Better IOP control is recommended. Discussed options, risks, and benefits of additional medication, SLT laser, and/or incisional glaucoma surgery. Reviewed importance of continued compliance with treatment and follow up.     Patient chooses defer and recheck IOP next visit     If IOP is still increased may consider the Xen gel OS     2. Pseudophakia  -- Condition stable, no therapeutic change required. Monitoring routinely.     3. History of cornea  transplant followed by Dr. Quintanilla     Return to clinic in 5 to 6 weeks  or as needed.  With IOP Check and GOCT

## 2018-11-16 ENCOUNTER — CLINICAL SUPPORT (OUTPATIENT)
Dept: REHABILITATION | Facility: HOSPITAL | Age: 83
End: 2018-11-16
Attending: ORTHOPAEDIC SURGERY
Payer: MEDICARE

## 2018-11-16 DIAGNOSIS — Z96.612 STATUS POST TOTAL SHOULDER ARTHROPLASTY, LEFT: Primary | ICD-10-CM

## 2018-11-16 PROCEDURE — 97110 THERAPEUTIC EXERCISES: CPT | Mod: HCNC

## 2018-11-19 ENCOUNTER — CLINICAL SUPPORT (OUTPATIENT)
Dept: REHABILITATION | Facility: HOSPITAL | Age: 83
End: 2018-11-19
Attending: ORTHOPAEDIC SURGERY
Payer: MEDICARE

## 2018-11-19 DIAGNOSIS — Z96.612 STATUS POST TOTAL SHOULDER ARTHROPLASTY, LEFT: Primary | ICD-10-CM

## 2018-11-19 PROCEDURE — 97140 MANUAL THERAPY 1/> REGIONS: CPT | Mod: HCNC

## 2018-11-19 PROCEDURE — 97110 THERAPEUTIC EXERCISES: CPT | Mod: HCNC

## 2018-11-26 ENCOUNTER — CLINICAL SUPPORT (OUTPATIENT)
Dept: REHABILITATION | Facility: HOSPITAL | Age: 83
End: 2018-11-26
Attending: ORTHOPAEDIC SURGERY
Payer: MEDICARE

## 2018-11-26 DIAGNOSIS — Z96.612 STATUS POST TOTAL SHOULDER ARTHROPLASTY, LEFT: Primary | ICD-10-CM

## 2018-11-26 DIAGNOSIS — M15.9 PRIMARY OSTEOARTHRITIS INVOLVING MULTIPLE JOINTS: ICD-10-CM

## 2018-11-26 PROCEDURE — 97164 PT RE-EVAL EST PLAN CARE: CPT | Mod: HCNC

## 2018-11-26 PROCEDURE — G8985 CARRY GOAL STATUS: HCPCS | Mod: CI,HCNC

## 2018-11-26 PROCEDURE — G8984 CARRY CURRENT STATUS: HCPCS | Mod: CJ,HCNC

## 2018-11-26 PROCEDURE — 97140 MANUAL THERAPY 1/> REGIONS: CPT | Mod: HCNC

## 2018-11-26 NOTE — PROGRESS NOTES
PHYSICAL THERAPY Re- EVALUATION    Referring Provider:  Dr. Solomon Lujan    Diagnosis:       ICD-10-CM ICD-9-CM    1. Status post total shoulder arthroplasty, left Z96.612 V43.61    2. Primary osteoarthritis involving multiple joints M15.0 715.09             Orders:  Evaluate and Treat    Date of Initial Evaluation: 10-24-18      Visit # 9    SUBJECTIVE:    Pt. Has no complaints with L shoulder today.        Current Pain:  Not reported today    OBJECTIVE:    Sensation: decreased sensation medial and lateral forearm and hand            Eval    Re-Eval         L    L  Shoulder A/PROM: Flexion     65 pain    80     Extension   WNL     WNL     Abduction   60 pain    90     Adduction   Lack 35 degrees   10     Internal Rotation  WNL neutral    WNL abd 90 deg.     External Rotation  0 degrees neutral  20 deg, 60 deg abd 90    L elbow flex: 115  L shoulder int. Rot functional reach L hip   L shoulder int. Rot functional reach L glut    R UE AROM: R UE ROM 75%  Flexion and abduction, WNL ext. And int. Rot              R shoulder int. Rot functional reach  T 8             Eval  Re-Eval         L   L  Strength:  Supraspinatus   1+/5  1+/5     Subscapularis   1+/5  3+/5     Latissimus dorsi   NT  NT     Rhomboids   NT  3+/5     Upper Trapezius  NT  NT     Middle Trapezius  NT  3+/5     Lower Trapezius  1+/5  1+/5     Levator scapula  NT  NT     Biceps    NT  4/5     Triceps   NT  4/5     Flexors    1+/5  1+/5     Abductors   1+/5  1+/5           STT (selective tension test)- NT    Special test:  NT    GHJ mobility: pain at end range of all planes of movement  AC mobility: NT  ST mobility: NT      Tenderness to palpation:  NT      Quick DASH Shoulder Questionnaire           Score 1-5  Re-Eval  1. Opening a tight jar       4/5    4/5  2. Do heavy household chores     NA    4/5  3. Carry a shopping bag or briefcase     4/5    2/5  4. Wash your back      3/5    4/5  5. Use a knife to cut food     3/5    NA  6. Recreactional  activities requiring force   5/5 4/5  7. Social Limitation      4/5 2/5  8. Work/ADL limitation      3/5    1/5  9. Arm, Shoulder or hand Pain    3/5    2/5  10. Tingling       2/5 1/5  11. Sleeping Limitation      2/5 1/5         Function: Patient reports 37.5% disability based on score of the Quick DASH.    Functional Limitations and Goal:   This patient's primary Physical Therapy goal is to improve his current level of function(carrying,moving and handling objects ) with minimal limitations. The patient's current level of impairment is 20-40% Impaired(CJ) based on their score of 37.5% impaired on the Quick DASH. The Patient is expected to achieve a score of 1-20%(CI) within 10 treatments.    ASSESSMENT:  Pt.  Is progressing with L shoulder ROM.  L shoulder PROM is 75% flexion and abduction about 60%.  Pt.'s strength is improving in L UE but shoulder strength is weak due to limited ROM.  Cont. POC.      Co-morbidities which may impact the plan of care and potentially impede the patient's progress in therapy include:  Patients CLINICAL PRESENTATION is STABLE. Pt. Has multiple comorbidities.  Pt. Had 2 body systems examined.  Pt. Has low complexity eval.     Short Term Goals:  1-4 weeks  1. I with HEP Met  2.60% or greater and pain free  Shoulder AROM Progressing      Long Term Goals: 5-12 weeks  1. Return to activities without complains of pain and dysfunction Not Met  2. Increase L shoulder/ UE strength to 4+/5 to perform lifting and carrying activities. Not Met  3. Increase L shoulder AROM to 75% or greater to reach into overhead cabinet.      TREATMENT PROVIDED:  -Manual Therapy:  L shoulder PROM  X 5 min., STM x 5 min. To L biceps long head   -Therapeutic Exercise: supervised 25 min.: shoulder shrugs x 2min., shoulder protraction/ retraction x 2min., shoulder circumduction x 2 min., AAROM with wand: flex x 2min., abd x 2 min., ext. Rot x 2 min.; int. Rot stretch with belt x 1 min., scap  retraction x 2 min., isometrics: flex x 2min., abd x 2min., ext. Rot x 2min., int. Rot x 2 min., add x 2min.  -Modalities: N/A  -Re-Evaluation completed  -Education on HEP and importance of performing HEP, condition and posture, activity modification    PLAN:  Patient will benefit from physical therapy (2-3) x/week for (10-11) weeks including manual therapy, therapeutic exercise, functional activities, modalities, and patient education.    Thank you for this referral.    These services are reasonable and necessary for the conditions set forth above while under my care.

## 2018-11-28 NOTE — PROGRESS NOTES
PHYSICAL THERAPY Daily Note    Referring Provider:  Dr. Solomon Lujan    Diagnosis:       ICD-10-CM ICD-9-CM    1. Status post total shoulder arthroplasty, left Z96.612 V43.61             Orders:  Evaluate and Treat    Date of Initial Evaluation: 10-24-18      Visit # 10    SUBJECTIVE:    Pt. Reports having 'boil' superior L shoulder and doctor examined it and wants her to have a biopsy next week.  Pt. Reports no discomfort in L shoulder.      Current Pain:  Not reported today      OBJECTIVE:    L shoulder int. Rot functional reach: L glut      ASSESSMENT: Pt. Has less muscle tension noted with L prox. Biceps tendon..  Pt. Finally reaching 60% L shoulder abduction actively. Pt. Reaches 65% L shoulder flexion actively, 50% ext. Rot actively, full L shoulder int. Rot.  Pt. Is showing progress with L shoulder AROM.  Began int. Rot strengthening with resistance today which pt. Tolerated well. Pt. Still lack full L scaption ROM but has more stability with middle trapezius strengthening.  Pt. Will begin strengthening L shoulder flexors next visit.        TREATMENT PROVIDED:  -Manual Therapy:  L shoulder PROM  X 5 min.  -Therapeutic Exercise: one on one 24 min.:  int. Rot stretch with belt x 2 min., scap retraction x 2 min., int. Rot x 2 min., lower trap x 2 min., middle trap x 2min. Biceps curl x 2 min., shoulder ext x 2 min., isometrics: flex x 2min., abd x 2min., ext. Rot x 2min., int. Rot x 2 min., add x 2min.  -Modalities: N/A  -Education on HEP and importance of performing HEP, condition and posture, activity modification    PLAN:  Patient will benefit from physical therapy (2-3) x/week for (10-11) weeks including manual therapy, therapeutic exercise, functional activities, modalities, and patient education.    Thank you for this referral.    These services are reasonable and necessary for the conditions set forth above while under my care.

## 2018-11-29 ENCOUNTER — TELEPHONE (OUTPATIENT)
Dept: DERMATOLOGY | Facility: CLINIC | Age: 83
End: 2018-11-29

## 2018-11-29 ENCOUNTER — CLINICAL SUPPORT (OUTPATIENT)
Dept: REHABILITATION | Facility: HOSPITAL | Age: 83
End: 2018-11-29
Attending: ORTHOPAEDIC SURGERY
Payer: MEDICARE

## 2018-11-29 ENCOUNTER — OFFICE VISIT (OUTPATIENT)
Dept: INTERNAL MEDICINE | Facility: CLINIC | Age: 83
End: 2018-11-29
Payer: MEDICARE

## 2018-11-29 VITALS
BODY MASS INDEX: 29.2 KG/M2 | OXYGEN SATURATION: 98 % | DIASTOLIC BLOOD PRESSURE: 80 MMHG | HEIGHT: 64 IN | TEMPERATURE: 98 F | RESPIRATION RATE: 18 BRPM | HEART RATE: 80 BPM | WEIGHT: 171.06 LBS | SYSTOLIC BLOOD PRESSURE: 132 MMHG

## 2018-11-29 DIAGNOSIS — M85.80 OSTEOPENIA, UNSPECIFIED LOCATION: Chronic | ICD-10-CM

## 2018-11-29 DIAGNOSIS — Z00.00 ENCOUNTER FOR PREVENTIVE HEALTH EXAMINATION: Primary | ICD-10-CM

## 2018-11-29 DIAGNOSIS — F41.9 ANXIETY: ICD-10-CM

## 2018-11-29 DIAGNOSIS — I10 ESSENTIAL HYPERTENSION: Chronic | ICD-10-CM

## 2018-11-29 DIAGNOSIS — E66.9 OBESITY (BMI 30.0-34.9): ICD-10-CM

## 2018-11-29 DIAGNOSIS — I51.89 DIASTOLIC DYSFUNCTION WITHOUT HEART FAILURE: Chronic | ICD-10-CM

## 2018-11-29 DIAGNOSIS — H40.1131 CHRONIC OPEN ANGLE GLAUCOMA OF BOTH EYES, MILD STAGE: ICD-10-CM

## 2018-11-29 DIAGNOSIS — G47.00 INSOMNIA, UNSPECIFIED TYPE: ICD-10-CM

## 2018-11-29 DIAGNOSIS — Z96.612 STATUS POST TOTAL SHOULDER ARTHROPLASTY, LEFT: Primary | ICD-10-CM

## 2018-11-29 DIAGNOSIS — I70.0 CALCIFICATION OF AORTA: Chronic | ICD-10-CM

## 2018-11-29 DIAGNOSIS — R73.03 PRE-DIABETES: ICD-10-CM

## 2018-11-29 DIAGNOSIS — D64.9 ANEMIA, UNSPECIFIED TYPE: ICD-10-CM

## 2018-11-29 DIAGNOSIS — N18.30 CHRONIC KIDNEY DISEASE, STAGE 3: Chronic | ICD-10-CM

## 2018-11-29 DIAGNOSIS — E78.49 OTHER HYPERLIPIDEMIA: Chronic | ICD-10-CM

## 2018-11-29 PROCEDURE — 3079F DIAST BP 80-89 MM HG: CPT | Mod: CPTII,HCNC,S$GLB, | Performed by: NURSE PRACTITIONER

## 2018-11-29 PROCEDURE — 99999 PR PBB SHADOW E&M-EST. PATIENT-LVL V: CPT | Mod: PBBFAC,HCNC,, | Performed by: NURSE PRACTITIONER

## 2018-11-29 PROCEDURE — 97110 THERAPEUTIC EXERCISES: CPT | Mod: HCNC

## 2018-11-29 PROCEDURE — 3075F SYST BP GE 130 - 139MM HG: CPT | Mod: CPTII,HCNC,S$GLB, | Performed by: NURSE PRACTITIONER

## 2018-11-29 PROCEDURE — G0439 PPPS, SUBSEQ VISIT: HCPCS | Mod: HCNC,S$GLB,, | Performed by: NURSE PRACTITIONER

## 2018-11-29 NOTE — ASSESSMENT & PLAN NOTE
Lab Results   Component Value Date    CHOL 180 03/16/2018    CHOL 181 02/19/2018    CHOL 258 (H) 01/04/2017     Lab Results   Component Value Date    HDL 43 03/16/2018    HDL 45 02/19/2018    HDL 44 01/04/2017     Lab Results   Component Value Date    LDLCALC 117.6 03/16/2018    LDLCALC 118.2 02/19/2018    LDLCALC 177.2 (H) 01/04/2017     Lab Results   Component Value Date    TRIG 97 03/16/2018    TRIG 89 02/19/2018    TRIG 184 (H) 01/04/2017     Lab Results   Component Value Date    CHOLHDL 23.9 03/16/2018    CHOLHDL 24.9 02/19/2018    CHOLHDL 17.1 (L) 01/04/2017     Stable and controlled with diet. Continue current treatment plan as previously prescribed with your PCP.

## 2018-11-29 NOTE — ASSESSMENT & PLAN NOTE
CMP  Sodium   Date Value Ref Range Status   10/18/2018 140 136 - 145 mmol/L Final     Potassium   Date Value Ref Range Status   10/18/2018 4.4 3.5 - 5.1 mmol/L Final     Chloride   Date Value Ref Range Status   10/18/2018 105 95 - 110 mmol/L Final     CO2   Date Value Ref Range Status   10/18/2018 25 23 - 29 mmol/L Final     Glucose   Date Value Ref Range Status   10/18/2018 87 70 - 110 mg/dL Final     BUN, Bld   Date Value Ref Range Status   10/18/2018 32 (H) 8 - 23 mg/dL Final     Creatinine   Date Value Ref Range Status   10/18/2018 1.3 0.5 - 1.4 mg/dL Final     Calcium   Date Value Ref Range Status   10/18/2018 10.2 8.7 - 10.5 mg/dL Final     Total Protein   Date Value Ref Range Status   07/24/2018 7.8 6.0 - 8.4 g/dL Final     Albumin   Date Value Ref Range Status   07/24/2018 3.8 3.5 - 5.2 g/dL Final     Total Bilirubin   Date Value Ref Range Status   07/24/2018 0.5 0.1 - 1.0 mg/dL Final     Comment:     For infants and newborns, interpretation of results should be based  on gestational age, weight and in agreement with clinical  observations.  Premature Infant recommended reference ranges:  Up to 24 hours.............<8.0 mg/dL  Up to 48 hours............<12.0 mg/dL  3-5 days..................<15.0 mg/dL  6-29 days.................<15.0 mg/dL       Alkaline Phosphatase   Date Value Ref Range Status   07/24/2018 74 55 - 135 U/L Final     AST   Date Value Ref Range Status   07/24/2018 18 10 - 40 U/L Final     ALT   Date Value Ref Range Status   07/24/2018 14 10 - 44 U/L Final     Anion Gap   Date Value Ref Range Status   10/18/2018 10 8 - 16 mmol/L Final     eGFR if    Date Value Ref Range Status   10/18/2018 43.2 (A) >60 mL/min/1.73 m^2 Final     eGFR if non    Date Value Ref Range Status   10/18/2018 37.5 (A) >60 mL/min/1.73 m^2 Final     Comment:     Calculation used to obtain the estimated glomerular filtration  rate (eGFR) is the CKD-EPI equation.        Stable. Avoids  NSAIDs. Continue current treatment plan as previously prescribed with your PCP.

## 2018-11-29 NOTE — PROGRESS NOTES
"Shirley Metz presented for a  Medicare AWV and comprehensive Health Risk Assessment today. The following components were reviewed and updated:    · Medical history  · Family History  · Social history  · Allergies and Current Medications  · Health Risk Assessment  · Health Maintenance  · Care Team     ** See Completed Assessments for Annual Wellness Visit within the encounter summary.**       The following assessments were completed:  · Living Situation  · CAGE  · Depression Screening  · Timed Get Up and Go  · Whisper Test  · Cognitive Function Screening  · Nutrition Screening  · ADL Screening  · PAQ Screening    Vitals:    11/29/18 0946   BP: 132/80   Pulse: 80   Resp: 18   Temp: 98 °F (36.7 °C)   TempSrc: Tympanic   SpO2: 98%   Weight: 77.6 kg (171 lb 1.2 oz)   Height: 5' 4" (1.626 m)     Body mass index is 29.37 kg/m².  Physical Exam   Constitutional: She is oriented to person, place, and time. She appears well-developed and well-nourished. No distress.   HENT:   Head: Normocephalic and atraumatic.   Eyes: Conjunctivae are normal.   Cardiovascular: Normal rate and regular rhythm. Exam reveals no gallop and no friction rub.   No murmur heard.  Pulmonary/Chest: Effort normal and breath sounds normal.   Neurological: She is alert and oriented to person, place, and time.   Skin: Skin is warm and dry.        Psychiatric: She has a normal mood and affect.   Vitals reviewed.        Diagnoses and health risks identified today and associated recommendations/orders:    1. Encounter for preventive health examination  Reviewed H/M. Patient declines shingles and PNA vaccines.    Calcification of aorta  6/25/11: CT chest: THORACIC AORTA IS CALCIFIED WITHOUT ANEURYSMAL DILATATION.    Stable and controlled. Continue current treatment plan as previously prescribed with your PCP.       Chronic kidney disease, stage 3  CMP  Sodium   Date Value Ref Range Status   10/18/2018 140 136 - 145 mmol/L Final     Potassium   Date Value " Ref Range Status   10/18/2018 4.4 3.5 - 5.1 mmol/L Final     Chloride   Date Value Ref Range Status   10/18/2018 105 95 - 110 mmol/L Final     CO2   Date Value Ref Range Status   10/18/2018 25 23 - 29 mmol/L Final     Glucose   Date Value Ref Range Status   10/18/2018 87 70 - 110 mg/dL Final     BUN, Bld   Date Value Ref Range Status   10/18/2018 32 (H) 8 - 23 mg/dL Final     Creatinine   Date Value Ref Range Status   10/18/2018 1.3 0.5 - 1.4 mg/dL Final     Calcium   Date Value Ref Range Status   10/18/2018 10.2 8.7 - 10.5 mg/dL Final     Total Protein   Date Value Ref Range Status   07/24/2018 7.8 6.0 - 8.4 g/dL Final     Albumin   Date Value Ref Range Status   07/24/2018 3.8 3.5 - 5.2 g/dL Final     Total Bilirubin   Date Value Ref Range Status   07/24/2018 0.5 0.1 - 1.0 mg/dL Final     Comment:     For infants and newborns, interpretation of results should be based  on gestational age, weight and in agreement with clinical  observations.  Premature Infant recommended reference ranges:  Up to 24 hours.............<8.0 mg/dL  Up to 48 hours............<12.0 mg/dL  3-5 days..................<15.0 mg/dL  6-29 days.................<15.0 mg/dL       Alkaline Phosphatase   Date Value Ref Range Status   07/24/2018 74 55 - 135 U/L Final     AST   Date Value Ref Range Status   07/24/2018 18 10 - 40 U/L Final     ALT   Date Value Ref Range Status   07/24/2018 14 10 - 44 U/L Final     Anion Gap   Date Value Ref Range Status   10/18/2018 10 8 - 16 mmol/L Final     eGFR if    Date Value Ref Range Status   10/18/2018 43.2 (A) >60 mL/min/1.73 m^2 Final     eGFR if non    Date Value Ref Range Status   10/18/2018 37.5 (A) >60 mL/min/1.73 m^2 Final     Comment:     Calculation used to obtain the estimated glomerular filtration  rate (eGFR) is the CKD-EPI equation.        Stable. Avoids NSAIDs. Continue current treatment plan as previously prescribed with your PCP.      Diastolic dysfunction without  heart failure  Echo 5/14/2017---CONCLUSIONS     1 - Concentric hypertrophy.     2 - No wall motion abnormalities.     3 - Normal left ventricular systolic function (EF 60-65%).     4 - Impaired LV relaxation, elevated LAP (grade 2 diastolic dysfunction).     5 - Normal right ventricular systolic function .     6 - Pulmonary hypertension. The estimated PA systolic pressure is 45 mmHg.     7 - Mild mitral regurgitation.     8 - Trivial to mild tricuspid regurgitation.     9 - Intermediate central venous pressure.     ECHO 3/16/18  CONCLUSIONS     1 - Concentric hypertrophy.     2 - No wall motion abnormalities.     3 - Normal left ventricular systolic function (EF 55-60%).     4 - Normal left ventricular diastolic function.     5 - Normal right ventricular systolic function .     6 - Mild tricuspid regurgitation.     Stable and controlled. Continue current treatment plan as previously prescribed with your PCP and cardiologist.    HTN (hypertension)  Stable and controlled. Continue current treatment plan as previously prescribed with your PCP.         Hyperlipemia  Lab Results   Component Value Date    CHOL 180 03/16/2018    CHOL 181 02/19/2018    CHOL 258 (H) 01/04/2017     Lab Results   Component Value Date    HDL 43 03/16/2018    HDL 45 02/19/2018    HDL 44 01/04/2017     Lab Results   Component Value Date    LDLCALC 117.6 03/16/2018    LDLCALC 118.2 02/19/2018    LDLCALC 177.2 (H) 01/04/2017     Lab Results   Component Value Date    TRIG 97 03/16/2018    TRIG 89 02/19/2018    TRIG 184 (H) 01/04/2017     Lab Results   Component Value Date    CHOLHDL 23.9 03/16/2018    CHOLHDL 24.9 02/19/2018    CHOLHDL 17.1 (L) 01/04/2017     Stable and controlled with diet. Continue current treatment plan as previously prescribed with your PCP.       Pre-diabetes  Lab Results   Component Value Date    HGBA1C 5.7 (H) 03/16/2018     Improving with diet changes. Continue current treatment plan as previously prescribed with your  PCP.      Osteopenia  DEXA 8/7/2017  DEXA 7/31/15 H -0.9, N -1.2, S 1.5     Stable and controlled. On vitamin D. Continue current treatment plan as previously prescribed with your PCP.       Insomnia  Takes partial xanax nightly. Continue current treatment plan as previously prescribed with your PCP.      Anxiety  Stable and controlled with clonodine and xanax. Continue current treatment plan as previously prescribed with your PCP.         COAG (chronic open-angle glaucoma) - Both Eyes  Not currently controlled. Followed by Dr. Ware.    Obesity (BMI 30.0-34.9)  Improving. Recent weight loss after shoulder surgery. Continue current treatment plan as previously prescribed with your PCP.      Anemia  Lab Results   Component Value Date    WBC 6.87 10/18/2018    HGB 11.5 (L) 10/18/2018    HCT 36.9 (L) 10/18/2018     (H) 10/18/2018     10/18/2018     This is a new problem discussed today. Likely due to CKD. Advised to follow up with PCP for further evaluation and treatment    History of BCC  Patient had BCC removed by Dr. Valadez in 2017. She has a new spot on her left shoulder she first noted last month. Given history of BCC and physical exam, recommended she follow up with derm.        Provided Shirley with a 5-10 year written screening schedule and personal prevention plan. Recommendations were developed using the USPSTF age appropriate recommendations. Education, counseling, and referrals were provided as needed. After Visit Summary printed and given to patient which includes a list of additional screenings\tests needed.    Follow-up in about 1 year (around 11/29/2019) for annual HRA.    Shirley Mccrary NP

## 2018-11-29 NOTE — ASSESSMENT & PLAN NOTE
Echo 5/14/2017---CONCLUSIONS     1 - Concentric hypertrophy.     2 - No wall motion abnormalities.     3 - Normal left ventricular systolic function (EF 60-65%).     4 - Impaired LV relaxation, elevated LAP (grade 2 diastolic dysfunction).     5 - Normal right ventricular systolic function .     6 - Pulmonary hypertension. The estimated PA systolic pressure is 45 mmHg.     7 - Mild mitral regurgitation.     8 - Trivial to mild tricuspid regurgitation.     9 - Intermediate central venous pressure.     ECHO 3/16/18  CONCLUSIONS     1 - Concentric hypertrophy.     2 - No wall motion abnormalities.     3 - Normal left ventricular systolic function (EF 55-60%).     4 - Normal left ventricular diastolic function.     5 - Normal right ventricular systolic function .     6 - Mild tricuspid regurgitation.     Stable and controlled. Continue current treatment plan as previously prescribed with your PCP and cardiologist.

## 2018-11-29 NOTE — ASSESSMENT & PLAN NOTE
Stable and controlled with clonodine and xanax. Continue current treatment plan as previously prescribed with your PCP.

## 2018-11-29 NOTE — ASSESSMENT & PLAN NOTE
Takes partial xanax nightly. Continue current treatment plan as previously prescribed with your PCP.

## 2018-11-29 NOTE — ASSESSMENT & PLAN NOTE
Improving. Recent weight loss after shoulder surgery. Continue current treatment plan as previously prescribed with your PCP.

## 2018-11-29 NOTE — TELEPHONE ENCOUNTER
----- Message from Barbara Durán sent at 11/29/2018 11:07 AM CST -----  Contact: self- [phone]  Would like to consult with the nurse. Would like to be work in sooner then January for a appt. patients has a spot on back. . Only wants to see Ms Valadez. Please call back at [phone]. Thanks lea

## 2018-11-29 NOTE — ASSESSMENT & PLAN NOTE
Lab Results   Component Value Date    WBC 6.87 10/18/2018    HGB 11.5 (L) 10/18/2018    HCT 36.9 (L) 10/18/2018     (H) 10/18/2018     10/18/2018     This is a new problem discussed today. Likely due to CKD. Advised to follow up with PCP for further evaluation and treatment

## 2018-11-29 NOTE — Clinical Note
Your patient was seen today for a HRA visit. Abnormalities have been identified during this visit that may require additional testing and  follow up. I have included a copy of my visit note, please review the note and feel free to contact me with any questions. Added anemia to problem listHas a new skin lesion on left upper back. Recommended derm follow up as she has a history of BCC. She will contact derm to be seen sooner than January. Thank you for allowing me to participate in the care of your patients.JHOAN [place]

## 2018-11-29 NOTE — ASSESSMENT & PLAN NOTE
6/25/11: CT chest: THORACIC AORTA IS CALCIFIED WITHOUT ANEURYSMAL DILATATION.    Stable and controlled. Continue current treatment plan as previously prescribed with your PCP.

## 2018-11-29 NOTE — ASSESSMENT & PLAN NOTE
Lab Results   Component Value Date    HGBA1C 5.7 (H) 03/16/2018     Improving with diet changes. Continue current treatment plan as previously prescribed with your PCP.

## 2018-11-29 NOTE — PATIENT INSTRUCTIONS
Counseling and Referral of Other Preventative  (Italic type indicates deductible and co-insurance are waived)    Patient Name: Shirley Metz  Today's Date: 11/29/2018    Health Maintenance       Date Due Completion Date    Zoster Vaccine 11/28/2019 (Originally [birth year]/3/1992) ---    Pneumococcal (65+) (1 of 2 - PCV13) 11/29/2019 (Originally 12/3/1997) ---    Lipid Panel 03/16/2019 3/16/2018    Mammogram 05/30/2019 5/30/2018    Override on 11/27/2012: Done    Override on 5/23/2011: Done    Override on 3/22/2010: Done    Override on 1/5/2009: Done    DEXA SCAN 08/07/2019 8/7/2017    Override on 11/17/2008: Done    Override on 11/21/2006: Done    Override on 12/8/2003: Done    TETANUS VACCINE 11/09/2021 11/9/2011        No orders of the defined types were placed in this encounter.    The following information is provided to all patients.  This information is to help you find resources for any of the problems found today that may be affecting your health:                Living healthy guide: www.[facility].[place].gov      Understanding Diabetes: www.diabetes.org      Eating healthy: www.cdc.gov/healthyweight      CDC home safety checklist: www.cdc.gov/steadi/patient.html      Agency on Aging: www.goea.[place].[facility]      Alcoholics anonymous (AA): www.aa.org      Physical Activity: www.andrew.nih.gov/rp8gqjr      Tobacco use: www.quitwithusla.org

## 2018-11-29 NOTE — ASSESSMENT & PLAN NOTE
DEXA 8/7/2017  DEXA 7/31/15 H -0.9, N -1.2, S 1.5     Stable and controlled. On vitamin D. Continue current treatment plan as previously prescribed with your PCP.

## 2018-12-03 ENCOUNTER — OFFICE VISIT (OUTPATIENT)
Dept: DERMATOLOGY | Facility: CLINIC | Age: 83
End: 2018-12-03
Payer: MEDICARE

## 2018-12-03 DIAGNOSIS — Z12.83 SCREENING, MALIGNANT NEOPLASM, SKIN: ICD-10-CM

## 2018-12-03 DIAGNOSIS — D18.00 ANGIOMA: ICD-10-CM

## 2018-12-03 DIAGNOSIS — Z85.828 HISTORY OF SKIN CANCER: ICD-10-CM

## 2018-12-03 DIAGNOSIS — D48.5 NEOPLASM OF UNCERTAIN BEHAVIOR OF SKIN: ICD-10-CM

## 2018-12-03 DIAGNOSIS — L82.1 SEBORRHEIC KERATOSIS: ICD-10-CM

## 2018-12-03 DIAGNOSIS — L91.0 HYPERTROPHIC SCAR: ICD-10-CM

## 2018-12-03 DIAGNOSIS — L90.5 SCAR CONDITIONS/SKIN FIBROSIS: Primary | ICD-10-CM

## 2018-12-03 PROCEDURE — 88305 TISSUE EXAM BY PATHOLOGIST: CPT | Mod: 26,HCNC,, | Performed by: PATHOLOGY

## 2018-12-03 PROCEDURE — 11900 INJECT SKIN LESIONS </W 7: CPT | Mod: 59,HCNC,S$GLB, | Performed by: DERMATOLOGY

## 2018-12-03 PROCEDURE — 99213 OFFICE O/P EST LOW 20 MIN: CPT | Mod: 25,HCNC,S$GLB, | Performed by: DERMATOLOGY

## 2018-12-03 PROCEDURE — 11100 PR BIOPSY OF SKIN LESION: CPT | Mod: HCNC,S$GLB,, | Performed by: DERMATOLOGY

## 2018-12-03 PROCEDURE — 99999 PR PBB SHADOW E&M-EST. PATIENT-LVL III: CPT | Mod: PBBFAC,HCNC,, | Performed by: DERMATOLOGY

## 2018-12-03 PROCEDURE — 1101F PT FALLS ASSESS-DOCD LE1/YR: CPT | Mod: CPTII,HCNC,S$GLB, | Performed by: DERMATOLOGY

## 2018-12-03 PROCEDURE — 11101 PR BIOPSY, EACH ADDED LESION: CPT | Mod: HCNC,S$GLB,, | Performed by: DERMATOLOGY

## 2018-12-03 PROCEDURE — 88305 TISSUE EXAM BY PATHOLOGIST: CPT | Mod: HCNC | Performed by: PATHOLOGY

## 2018-12-03 RX ORDER — TRIAMCINOLONE ACETONIDE 40 MG/ML
20 INJECTION, SUSPENSION INTRA-ARTICULAR; INTRAMUSCULAR
Status: COMPLETED | OUTPATIENT
Start: 2018-12-03 | End: 2018-12-03

## 2018-12-03 RX ADMIN — TRIAMCINOLONE ACETONIDE 20 MG: 40 INJECTION, SUSPENSION INTRA-ARTICULAR; INTRAMUSCULAR at 11:12

## 2018-12-03 NOTE — PROGRESS NOTES
Subjective:       Patient ID:  Shirley Metz is a 86 y.o. female who presents for   Chief Complaint   Patient presents with    Acne     on shoulder      Hx of BCC of the left mid-back (s/p ED&C on 5/21/18), last seen on 7/17/18.  She is s/p left total shoulder arthropalsty on 8/21/18 and noted development of new lesion superior to surgical site x 1 month.  Denies pain.  No tx tried.         Review of Systems   Constitutional: Negative for fever and chills.   Gastrointestinal: Negative for nausea and vomiting.   Skin: Negative for daily sunscreen use, activity-related sunscreen use and recent sunburn.   Hematologic/Lymphatic: Does not bruise/bleed easily.        Objective:    Physical Exam   Constitutional: She appears well-developed and well-nourished. No distress.   Neurological: She is alert and oriented to person, place, and time. She is not disoriented.   Psychiatric: She has a normal mood and affect.   Skin:   Areas Examined (abnormalities noted in diagram):   Head / Face Inspection Performed  Neck Inspection Performed  Chest / Axilla Inspection Performed  Abdomen Inspection Performed  Back Inspection Performed  RUE Inspected  LUE Inspection Performed  Nails and Digits Inspection Performed                   Diagram Legend     Erythematous scaling macule/papule c/w actinic keratosis       Vascular papule c/w angioma      Pigmented verrucoid papule/plaque c/w seborrheic keratosis      Yellow umbilicated papule c/w sebaceous hyperplasia      Irregularly shaped tan macule c/w lentigo     1-2 mm smooth white papules consistent with Milia      Movable subcutaneous cyst with punctum c/w epidermal inclusion cyst      Subcutaneous movable cyst c/w pilar cyst      Firm pink to brown papule c/w dermatofibroma      Pedunculated fleshy papule(s) c/w skin tag(s)      Evenly pigmented macule c/w junctional nevus     Mildly variegated pigmented, slightly irregular-bordered macule c/w mildly atypical nevus       Flesh colored to evenly pigmented papule c/w intradermal nevus       Pink pearly papule/plaque c/w basal cell carcinoma      Erythematous hyperkeratotic cursted plaque c/w SCC      Surgical scar with no sign of skin cancer recurrence      Open and closed comedones      Inflammatory papules and pustules      Verrucoid papule consistent consistent with wart     Erythematous eczematous patches and plaques     Dystrophic onycholytic nail with subungual debris c/w onychomycosis     Umbilicated papule    Erythematous-base heme-crusted tan verrucoid plaque consistent with inflamed seborrheic keratosis     Erythematous Silvery Scaling Plaque c/w Psoriasis     See annotation                Assessment / Plan:      Pathology Orders:     Normal Orders This Visit    Tissue Specimen To Pathology, Dermatology     Questions:    Directional Terms:  Other(comment)    Clinical information:  scarred EIC vs. BCC    Specific Site:  left forehead    Tissue Specimen To Pathology, Dermatology     Questions:    Directional Terms:  Other(comment)    Clinical information:  SCC, KA-type    Specific Site:  left posterior shoulder        Scar conditions/skin fibrosis  Screening, malignant neoplasm, skin  History of skin cancer  Scar of the left mid-back, hx of NMSC.  No evidence of recurrence on physical exam today.  Continue routine skin surveillance. Daily sunscreen advised.    Seborrheic keratosis  Reassurance given. Discussed diagnosis and that lesions are benign.  AAD handout given.     Angioma  Reassurance given.  Lesions are benign.    Hypertrophic scar  -     triamcinolone acetonide injection 20 mg  -     ILK #1 done today. After risks, benefits and alternatives explained and side effect profile reviewed, including hypopigmentation, telangiectasia and atrophy, the patient verbally consented to ILK injection. A total of 0.2 cc of kenalog 20 mg/ml was injected into the hypertrophic scar of the left mid-back.  Pt tolerated well without side  effects.  RTC in 4 weeks for repeat injection.      Neoplasm of uncertain behavior of skin  -     Tissue Specimen To Pathology, Dermatology  -     Tissue Specimen To Pathology, Dermatology  -     Shave biopsy(-ies) done of 2 site(s).   Patient informed to call for results within 2 weeks if have not [facility]             Follow-up for call for results.     PROCEDURE NOTE - SHAVE BIOPSY   Location: see above    After risk, benefits, and alternatives were discussed with the patient, the patient agrees to the procedure by verbal informed consent.  The area(s) were cleansed with alcohol. 2 cc of lidocaine 1% with epinephrine was injected for local anesthesia into each lesion(s).  A sharp dermablade was used to remove part or all of the lesion(s).  The specimen(s) will be sent for tissue pathology.  Hemostasis was obtained with aluminum chloride and/or hyfrecation.  The area(s) were dressed with vaseline ointment and bandaged.  The patient tolerated the procedure well without adverse events.  Wound care instructions were given to the patient on the AVS.  The patient will be notified of pathology results once available. Results will also be available in Epic.

## 2018-12-03 NOTE — PATIENT INSTRUCTIONS
Shave Biopsy Wound Care    Your doctor has performed a shave biopsy today.  A band aid and vaseline ointment has been placed over the site.  This should remain in place for 24 hours.  It is recommended that you keep the area dry for the first 24 hours.  After 24 hours, you may remove the band aid and wash the area with warm soap and water and apply Vaseline jelly.  Many patients prefer to use Neosporin or Bacitracin ointment.  This is acceptable; however, know that you can develop an allergy to this medication even if you have used it safely for years.  It is important to keep the area moist.  Letting it dry out and get air slows healing time, and will worsen the scar.  Band aid is optional after first 24 hours.      If you notice increasing redness, tenderness, pain, or yellow drainage at the biopsy site, please notify your doctor.  These are signs of an infection.    If your biopsy site is bleeding, apply firm pressure for 15 minutes straight.  Repeat for another 15 minutes, if it is still bleeding.   If the surgical site continues to bleed, then please contact your doctor.      [facility]   DERMATOLOGY  [address]   [place] [address]   Dept: [phone]   Dept Fax: [phone]

## 2018-12-06 ENCOUNTER — CLINICAL SUPPORT (OUTPATIENT)
Dept: REHABILITATION | Facility: HOSPITAL | Age: 83
End: 2018-12-06
Attending: ORTHOPAEDIC SURGERY
Payer: MEDICARE

## 2018-12-06 DIAGNOSIS — M25.512 CHRONIC LEFT SHOULDER PAIN: ICD-10-CM

## 2018-12-06 DIAGNOSIS — Z96.612 STATUS POST TOTAL SHOULDER ARTHROPLASTY, LEFT: Primary | ICD-10-CM

## 2018-12-06 DIAGNOSIS — M15.9 PRIMARY OSTEOARTHRITIS INVOLVING MULTIPLE JOINTS: ICD-10-CM

## 2018-12-06 DIAGNOSIS — G89.29 CHRONIC LEFT SHOULDER PAIN: ICD-10-CM

## 2018-12-06 PROCEDURE — 97110 THERAPEUTIC EXERCISES: CPT | Mod: HCNC

## 2018-12-06 NOTE — PROGRESS NOTES
PHYSICAL THERAPY Daily Note    Referring Provider:  Dr. Solomon Lujan    Diagnosis:       ICD-10-CM ICD-9-CM    1. Status post total shoulder arthroplasty, left Z96.612 V43.61    2. Primary osteoarthritis involving multiple joints M15.0 715.09    3. Chronic left shoulder pain M25.512 719.41     G89.29 338.29             Orders:  Evaluate and Treat    Date of Initial Evaluation: 10-24-18      Visit # 11    SUBJECTIVE:    Pt. Reports that her L shoulder is feeling 'good'.    Current Pain:  Not reported today      OBJECTIVE:    L shoulder int. Rot functional reach: L glut      ASSESSMENT: Passively L shoulder flex 75% and abd 65%, 90% ext. Rot , int. Rot and hor. Add WNL. No muscle tension L biceps tendon.  Pt. Showing increase ROM reaching 50% scaption/ lower trap strengthening. Pt. Had biopsy Monday of boil on L superior shoulder- undergoing testing to find if it is cancerous.  Pt. Advanced to strengthening with theraband for int and ext. Rot strengthening and tolerated it well.  Cont. POC.        TREATMENT PROVIDED:  -Manual Therapy:  L shoulder PROM  X 5 min.  -Therapeutic Exercise: one on one 25 min.:  scap retraction x 2 min., lower trap x 2 min., middle trap x 2min. Biceps curl x 2 min., shoulder ext x 2 min., isometrics: flex x 2min., abd x 2min., add x 2 min.; Theraband: ext. Rot x 2min., int. Rot x 2 min., AROM/AAROM x 5 min.   -Modalities: N/A  -Education on HEP and importance of performing HEP, condition and posture, activity modification    PLAN:  Patient will benefit from physical therapy (2-3) x/week for (10-11) weeks including manual therapy, therapeutic exercise, functional activities, modalities, and patient education.    Thank you for this referral.    These services are reasonable and necessary for the conditions set forth above while under my care.

## 2018-12-10 ENCOUNTER — CLINICAL SUPPORT (OUTPATIENT)
Dept: REHABILITATION | Facility: HOSPITAL | Age: 83
End: 2018-12-10
Attending: ORTHOPAEDIC SURGERY
Payer: MEDICARE

## 2018-12-10 ENCOUNTER — TELEPHONE (OUTPATIENT)
Dept: ORTHOPEDICS | Facility: CLINIC | Age: 83
End: 2018-12-10

## 2018-12-10 ENCOUNTER — HOSPITAL ENCOUNTER (OUTPATIENT)
Dept: RADIOLOGY | Facility: HOSPITAL | Age: 83
Discharge: HOME | End: 2018-12-10
Attending: ORTHOPAEDIC SURGERY
Payer: MEDICARE

## 2018-12-10 ENCOUNTER — OFFICE VISIT (OUTPATIENT)
Dept: ORTHOPEDICS | Facility: CLINIC | Age: 83
End: 2018-12-10
Payer: MEDICARE

## 2018-12-10 VITALS
DIASTOLIC BLOOD PRESSURE: 77 MMHG | SYSTOLIC BLOOD PRESSURE: 159 MMHG | WEIGHT: 171 LBS | BODY MASS INDEX: 29.19 KG/M2 | HEART RATE: 93 BPM | HEIGHT: 64 IN

## 2018-12-10 DIAGNOSIS — Z96.612 STATUS POST TOTAL SHOULDER ARTHROPLASTY, LEFT: ICD-10-CM

## 2018-12-10 DIAGNOSIS — M19.012 PRIMARY OSTEOARTHRITIS OF LEFT SHOULDER: Primary | ICD-10-CM

## 2018-12-10 DIAGNOSIS — R52 PAIN: Primary | ICD-10-CM

## 2018-12-10 DIAGNOSIS — Z96.612 STATUS POST TOTAL SHOULDER ARTHROPLASTY, LEFT: Primary | ICD-10-CM

## 2018-12-10 DIAGNOSIS — R52 PAIN: ICD-10-CM

## 2018-12-10 PROCEDURE — 97140 MANUAL THERAPY 1/> REGIONS: CPT | Mod: HCNC

## 2018-12-10 PROCEDURE — 73030 X-RAY EXAM OF SHOULDER: CPT | Mod: TC,FY,HCNC,PO,LT

## 2018-12-10 PROCEDURE — 99213 OFFICE O/P EST LOW 20 MIN: CPT | Mod: HCNC,S$GLB,, | Performed by: ORTHOPAEDIC SURGERY

## 2018-12-10 PROCEDURE — 73030 X-RAY EXAM OF SHOULDER: CPT | Mod: 26,HCNC,LT, | Performed by: RADIOLOGY

## 2018-12-10 PROCEDURE — 99999 PR PBB SHADOW E&M-EST. PATIENT-LVL IV: CPT | Mod: PBBFAC,HCNC,, | Performed by: ORTHOPAEDIC SURGERY

## 2018-12-10 PROCEDURE — 1101F PT FALLS ASSESS-DOCD LE1/YR: CPT | Mod: CPTII,HCNC,S$GLB, | Performed by: ORTHOPAEDIC SURGERY

## 2018-12-10 NOTE — PROGRESS NOTES
Subjective:     Patient ID: Shirley Metz is a 86 y.o. female.    Chief Complaint: Pain of the Left Shoulder    She is post op 4 months left reverse total shoulder arthroplasty. Pain is continuing to improve.     Continue to improve with therapy, improving strength, range of motion and pain is continuing to improve.    She also had recently a biopsy taken on a lesion that she states appeared in past 1-2 months on her lateral aspect of her shoulder, awaiting results from Dermatology.      Shoulder Pain    The pain is present in the left shoulder. The current episode started more than 1 month ago. The problem occurs intermittently. The problem has been resolved. The quality of the pain is described as aching. The pain is at a severity of 0/10. Pertinent negatives include no fever, itching or numbness. The symptoms are aggravated by activity. She has tried OTC pain meds, OTC ointments, heat and brace/corset for the symptoms. The treatment provided moderate relief. Physical therapy was effective.      Past Medical History:   Diagnosis Date    Anemia 11/29/2018    Anxiety 11/28/2017    Arthritis     Back pain     Basal cell carcinoma 03/29/2018    left mid back    Colon polyps     reported per patient.     Fuchs' corneal dystrophy     General anesthetics causing adverse effect in therapeutic use     woke up during surgery and gagged on ET tube    Glaucoma     Glaucoma     Heart failure with preserved left ventricular function (HFpEF) 7/24/2018    Hyperlipidemia     Hypertension     Macular degeneration dry    Obesity     Trouble in sleeping     Urinary tract infection      Past Surgical History:   Procedure Laterality Date    ADENOIDECTOMY  1938    ARTHROPLASTY, SHOULDER, TOTAL, REVERSE Left 8/21/2018    [facility]    BREAST BIOPSY Left     1997. benign    BREAST CYST EXCISION Left 1997 approx    CARPAL TUNNEL RELEASE Right 2012 approx    CATARACT  EXTRACTION Bilateral 1994    CHOLECYSTECTOMY  1975    COLONOSCOPY N/A 11/26/2013    Per[facility] by Familia Santo [facility] at [facility] ENDO    CORNEAL TRANSPLANT Bilateral 08/2015 8/2015 - left; Right 4/2016    EYE SURGERY      Fuch's Corneal dystrophy - had 2nd operation with Dr. Quintanilla    EYE SURGERY      Cataract wIOL    left thumb Left 2011    removed cuboid for arthritis    REVERSE TOTAL SHOULDER ARTHROPLASTY Left 8/21/2018    Procedure: ARTHROPLASTY, SHOULDER, TOTAL, REVERSE;  Surgeon: Solomon Lujan MD;  Location: [facility] OR;  Service: Orthopedics;  Laterality: Left;  WITH BICEP TENODESIS    SKIN CANCER EXCISION Left 2017    BCC removal by Dr. Valadez    SLT- OS (aka TRABECULOPLASTY) Left 05/11/2011    repeat 3/14/12    TENOTOMY Left 8/21/2018    Procedure: TENOTOMY;  Surgeon: Solomon Lujan MD;  Location: [facility] OR;  Service: Orthopedics;  Laterality: Left;    TENOTOMY Left 8/21/2018    [facility] at [facility] OR    TONSILLECTOMY  1938     Family History   Problem Relation Age of Onset    Heart disease Mother     Hypertension Mother     Cancer Father 88        sarcoma( ?[place]'s ) on leg    Deep vein thrombosis Neg Hx     Ovarian cancer Neg Hx     Breast cancer Neg Hx     Kidney disease Neg Hx     Strabismus Neg Hx     Retinal detachment Neg Hx     Macular degeneration Neg Hx     Glaucoma Neg Hx     Blindness Neg Hx     Amblyopia Neg Hx     Eczema Neg Hx     Lupus Neg Hx     Melanoma Neg Hx     Psoriasis Neg Hx     Diabetes Neg Hx     Stroke Neg Hx     Mental retardation Neg Hx     Mental illness Neg Hx     Hyperlipidemia Neg Hx     COPD Neg Hx     Asthma Neg Hx     Depression Neg Hx     Alcohol abuse Neg Hx     Drug abuse Neg Hx      Social History     Socioeconomic History    Marital status:      Spouse name: Not on file    Number of children: Not on file    Years of education: Not on file    Highest education level: Not on file   Social Needs     Financial resource strain: Not on file    Food insecurity - worry: Not on file    Food insecurity - inability: Not on file    Transportation needs - medical: Not on file    Transportation needs - non-medical: Not on file   Occupational History    Not on file   Tobacco Use    Smoking status: Never Smoker    Smokeless tobacco: Never Used    Tobacco comment: history of passive smoking from her ex-   Substance and Sexual Activity    Alcohol use: Yes     Alcohol/week: 1.2 oz     Types: 2 Standard drinks or equivalent per week     Comment: occasional     Drug use: No    Sexual activity: Not Currently     Partners: Male     Birth control/protection: Post-menopausal     Comment: discussed protection for STD's   Other Topics Concern    Are you pregnant or think you may be? No    Breast-feeding No   Social History Narrative     x 2,  x 1. Retired artist - previously doing jewelry/wall pieces; ; lives in [facility] [place] [facility]; has 3 sons - 52( lives in [place], 54, and 56;exercises minimally - limited due to back issues. Still drives. Does have a Living Will.        Medication List           Accurate as of 12/10/18  8:44 PM. If you have any questions, ask your nurse or doctor.               CHANGE how you take these medications    cloNIDine 0.1 MG tablet  Commonly known as:  CATAPRES  Take 1 tablet (0.1 mg total) by mouth 2 (two) times daily.  What changed:    · how much to take  · when to take this        CONTINUE taking these medications    acetaminophen 500 MG tablet  Commonly known as:  TYLENOL     ALPHA LIPOIC ACID ORAL     ALPRAZolam 0.5 MG tablet  Commonly known as:  XANAX  TAKE 1/2 - 1 TABLET BY MOUTH NIGHTLY FOR SLEEP OR ANXIETY     B complex-vitamin C-folic acid 0.8 mg Tab  Commonly known as:  LEVY-REG/NEPHRO-REG     BOSWELLIA LASHAWN XT (BULK) MISC     CALCIUM CITRATE ORAL     coenzyme Q10 200 mg capsule     fish oil-omega-3 fatty acids 300-1,000 mg capsule      losartan 50 MG tablet  Commonly known as:  COZAAR  Take 1 tablet (50 mg total) by mouth 2 (two) times daily.     LOTEMAX 0.5 % Drpg  Generic drug:  loteprednol etabonate     MIRALAX ORAL     netarsudil 0.02 % Drop  Commonly known as:  RHOPRESSA  Place 1 drop into the left eye every evening.     pyridoxine (vitamin B6) 100 MG Tab  Commonly known as:  B-6     travoprost 0.004 % ophthalmic solution  Commonly known as:  TRAVATAN Z  Place 1 drop into the left eye every evening.     triamterene-hydrochlorothiazide 37.5-25 mg 37.5-25 mg per capsule  Commonly known as:  DYAZIDE  Take 1 capsule by mouth once daily.     VITAMIN D3 2,000 unit Cap  Generic drug:  cholecalciferol (vitamin D3)          Review of patient's allergies indicates:   Allergen Reactions    Claritin [loratadine] Other (See Comments)     Double vision/spots    Hydrocodone-acetaminophen      wheezing    Naproxen      wheezing    Verapamil Diarrhea    Vibramycin [doxycycline calcium] Other (See Comments)     Weakness nausea   sob    Amlodipine      Myalgias      Coreg [carvedilol] Swelling     lips    Fenofibrate      Causes muscle weakness    Hydralazine analogues      Muscle tremors/aches      Isosorbide     Lasix [furosemide]      myalgias    Moxifloxacin Other (See Comments)     Hives   muscle soreness    Timolol     Adhesive Rash     Clonidine patch - 2 mfg - contact dermatitis    Allegra [fexofenadine] Other (See Comments)     Double vision/spots     Codeine Diarrhea and Other (See Comments)     Loss of balance     Erythromycin Other (See Comments)     Complete weakness/could not walk    Hydrocortisone (bulk) Other (See Comments)     Only suppository/ caused muscle weakness    Macrolide antibiotics Other (See Comments)     Complete weakness/could not walk    Patanol [olopatadine] Other (See Comments)     Muscle weakness    Prednisone Anxiety    Statins-hmg-coa reductase inhibitors Other (See Comments)     Muscle weakness     Xalatan [latanoprost] Other (See Comments)     Muscle weakness     Review of Systems   Constitution: Negative for fever.   HENT: Negative for sore throat.    Eyes: Negative for blurred vision.   Cardiovascular: Negative for dyspnea on exertion.   Respiratory: Negative for shortness of breath.    Hematologic/Lymphatic: Does not bruise/bleed easily.   Skin: Negative for itching.   Gastrointestinal: Negative for vomiting.   Genitourinary: Negative for dysuria.   Neurological: Negative for dizziness and numbness.   Psychiatric/Behavioral: The patient does not have insomnia.        Objective:   Body mass index is 29.35 kg/m².  Vitals:    12/10/18 1447   BP: (!) 159/77   Pulse: 93           General    Nursing note and vitals reviewed.  Constitutional: She is oriented to person, place, and time. She appears well-developed. No distress.   HENT:   Head: Normocephalic and atraumatic.   Eyes: EOM are normal.   Cardiovascular: Normal rate.    Pulmonary/Chest: Effort normal. No stridor.   Neurological: She is alert and oriented to person, place, and time.   Psychiatric: She has a normal mood and affect. Her behavior is normal.             Left Shoulder Exam     Tests & Signs   Rotator cuff painful arc/range: No significant.    Other   Sensation: normal     Comments:  Left shoulder incision clean dry intact healing well no signs infection, sensation light touch axillary nerve distribution, active range of motion with forward flexion to 100, external rotation at the side is to 10, internal rotation is to L5 no crepitus      Vascular Exam       Capillary Refill  Left Hand: normal capillary refill      IMAGING    EXAMINATION:  XR SHOULDER COMPLETE 2 OR MORE VIEWS LEFT    CLINICAL HISTORY:  Pain, unspecified    TECHNIQUE:  Two or three views of the left shoulder were performed.    COMPARISON:  09/05/2018    FINDINGS:  Postoperative findings related to prior left shoulder arthroplasty with reverse prosthesis again noted.  Hardware  appears unchanged without definite evidence of failure or loosening.  No acute fractures or dislocations visualized.  Mild AC joint arthrosis again noted.  Skin staples have been removed.   Impression       As Above      Electronically signed by: Molina Dial MD  Date: 12/10/2018  Time: 15:48     Radiographs / Imaging : Results reviewed by me and interpreted by me, discussed with the patient and / or family       Assessment:     Encounter Diagnoses   Name Primary?    Primary osteoarthritis of left shoulder Yes    Status post total shoulder arthroplasty, left         Plan:     -continue physical therapy ROM, strengthening  -avoid lifting more than 5 pounds overhead  -follow up dermatology biopsy  -follow up in 3 months with new xrays left shoulder

## 2018-12-10 NOTE — PROGRESS NOTES
PHYSICAL THERAPY Daily Note    Referring Provider:  Dr. Solomon Lujan    Diagnosis:       ICD-10-CM ICD-9-CM    1. Status post total shoulder arthroplasty, left Z96.612 V43.61             Orders:  Evaluate and Treat    Date of Initial Evaluation: 10-24-18      Visit # 12    SUBJECTIVE:    Pt. Reports that her blood pressure is low today and does not feel that she can do exercise today.      Current Pain: 0/10      OBJECTIVE:      ASSESSMENT:   Pt. Noted to tolerate greater L shoulder flexion passively 170 degrees.  Pt. Tolerates 75% L shoulder abduction passively.  Pt. Tolerate 90% L shoulder internal rotation passively.  L shoulder internal rotation and hor. Adduction WNL passively.  Pt. Had moderate muscle tension L prox. Biceps tendon that resolved well with STM.  Cont. POC.        TREATMENT PROVIDED:  -Manual Therapy:  L shoulder PROM  X 5 min., STM L proximal biceps tendon x 5 min.  -Therapeutic Exercise: N/A  -Modalities: N/A  -Education on HEP and importance of performing HEP, condition and posture, activity modification    PLAN:  Patient will benefit from physical therapy (2-3) x/week for (10-11) weeks including manual therapy, therapeutic exercise, functional activities, modalities, and patient education.    Thank you for this referral.    These services are reasonable and necessary for the conditions set forth above while under my care.

## 2018-12-11 ENCOUNTER — TELEPHONE (OUTPATIENT)
Dept: DERMATOLOGY | Facility: CLINIC | Age: 83
End: 2018-12-11

## 2018-12-11 NOTE — TELEPHONE ENCOUNTER
Returned call to pt and informed her we will contact her as soon as Dr Valadez reviews her biopsy results and give us her plan of care.  Pt verbalized understanding to information given and stated she will be available after 2 pm tomorrow (12/12/18)

## 2018-12-11 NOTE — TELEPHONE ENCOUNTER
----- Message from Jackie Durán sent at 12/11/2018  2:41 PM CST -----  Contact: pt  Calling for results and please advise. [phone]

## 2018-12-12 ENCOUNTER — TELEPHONE (OUTPATIENT)
Dept: DERMATOLOGY | Facility: CLINIC | Age: 83
End: 2018-12-12

## 2018-12-12 ENCOUNTER — PATIENT MESSAGE (OUTPATIENT)
Dept: DERMATOLOGY | Facility: CLINIC | Age: 83
End: 2018-12-12

## 2018-12-12 NOTE — TELEPHONE ENCOUNTER
Called to advise pt that her path report has been placed in the mail and r/s pt for a sooner procedure.

## 2018-12-12 NOTE — TELEPHONE ENCOUNTER
----- Message from Mona Fuller sent at 12/12/2018  3:07 PM CST -----  Contact: Shirley Rg [phone] or [phone]  Returning a missed call from the nurse

## 2018-12-12 NOTE — TELEPHONE ENCOUNTER
LVM for pt to call clinic to get scheduled for a sooner date also to advise her that I have mailed her path report to her.

## 2018-12-13 NOTE — PROGRESS NOTES
PHYSICAL THERAPY Daily Note    Referring Provider:  Dr. Solomon Lujan    Diagnosis:       ICD-10-CM ICD-9-CM    1. Status post total shoulder arthroplasty, left Z96.612 V43.61             Orders:  Evaluate and Treat    Date of Initial Evaluation: 10-24-18      Visit # 13    SUBJECTIVE:    Pt. Reports will have cancer spot removed from superior shoulder January 8.  Pt. Reports returning to ortho surgeon and surgeons feels that everything is going well.      Current Pain: 0/10      OBJECTIVE:      ASSESSMENT:   Pt reports having accupuncture this morning.  Pt more relaxed during PROM today.  Only mild limitation noted with L shoulder flexion, abduction, external rotation passively.  Pt. Has full L shoulder hor. Adduction and int. Rot passively.  Pt. Has about 90 degrees active L shoulder flexion and abduction, 50% ext. Rot., full L int. Rot., and only 5 degrees hor. Add.  Began scapula mob manually today and additional L shoulder flexion and abduction stretches to increase ROM.          TREATMENT PROVIDED:  -Manual Therapy:  L shoulder PROM  X 5 min., STM L proximal biceps tendon x 3 min., L shoulder scapula mob x 2min.   -Therapeutic Exercise:  one on one 13 min., supervised x 16 min.   scap retraction x 2 min., lower trap x 2 min., middle trap x 2min. Biceps curl x 2 min., shoulder ext x 2 min., isometrics: flex x 2min., abd x 2min., add x 2 min.; Theraband: ext. Rot x 2min., int. Rot x 2 min., AROM/AAROM x 5 min, flex stretch x 2min., abd stretch x 2 min.   -Modalities: N/A  -Education on HEP and importance of performing HEP, condition and posture, activity modification    PLAN:  Patient will benefit from physical therapy (2-3) x/week for (10-11) weeks including manual therapy, therapeutic exercise, functional activities, modalities, and patient education.    Thank you for this referral.    These services are reasonable and necessary for the conditions set forth above while under my care.

## 2018-12-14 ENCOUNTER — CLINICAL SUPPORT (OUTPATIENT)
Dept: REHABILITATION | Facility: HOSPITAL | Age: 83
End: 2018-12-14
Attending: ORTHOPAEDIC SURGERY
Payer: MEDICARE

## 2018-12-14 DIAGNOSIS — Z96.612 STATUS POST TOTAL SHOULDER ARTHROPLASTY, LEFT: Primary | ICD-10-CM

## 2018-12-14 PROCEDURE — 97140 MANUAL THERAPY 1/> REGIONS: CPT | Mod: HCNC

## 2018-12-14 PROCEDURE — 97110 THERAPEUTIC EXERCISES: CPT | Mod: HCNC

## 2018-12-17 ENCOUNTER — OFFICE VISIT (OUTPATIENT)
Dept: OPHTHALMOLOGY | Facility: CLINIC | Age: 83
End: 2018-12-17
Payer: MEDICARE

## 2018-12-17 ENCOUNTER — PATIENT MESSAGE (OUTPATIENT)
Dept: DERMATOLOGY | Facility: CLINIC | Age: 83
End: 2018-12-17

## 2018-12-17 DIAGNOSIS — Z94.7 HISTORY OF CORNEA TRANSPLANT: ICD-10-CM

## 2018-12-17 DIAGNOSIS — M35.01 KERATITIS SICCA, BILATERAL: ICD-10-CM

## 2018-12-17 DIAGNOSIS — H40.1134 PRIMARY OPEN ANGLE GLAUCOMA OF BOTH EYES, INDETERMINATE STAGE: Primary | ICD-10-CM

## 2018-12-17 DIAGNOSIS — Z96.1 PSEUDOPHAKIA: ICD-10-CM

## 2018-12-17 PROCEDURE — 92133 CPTRZD OPH DX IMG PST SGM ON: CPT | Mod: HCNC,S$GLB,, | Performed by: OPHTHALMOLOGY

## 2018-12-17 PROCEDURE — 92012 INTRM OPH EXAM EST PATIENT: CPT | Mod: HCNC,S$GLB,, | Performed by: OPHTHALMOLOGY

## 2018-12-17 PROCEDURE — 99999 PR PBB SHADOW E&M-EST. PATIENT-LVL II: CPT | Mod: PBBFAC,HCNC,, | Performed by: OPHTHALMOLOGY

## 2018-12-17 NOTE — PROGRESS NOTES
SUBJECTIVE:   Shirley Metz is a 86 y.o. female   Corrected distance visual acuity was 20/30 -1 in the right eye and 20/30 in the left eye.   Chief Complaint   Patient presents with    Glaucoma        HPI:  HPI     Pt here for 6 wk IOP chk. No pain or discomfort. VA stable. 100%   compliant with gtts.     Lives at [facility] sx on 8/21/18    1. Mild COAG Goal < 19  SLT OD 3/04 (20 to 15) + 3/11 (19 to 15)  SLT OS 5/05 (20 to 14) +5/11 (18-19 to 15) + 3/12 (min resp.) + 3/11/15   (20.5-17.5) + 3/7/18 (24- 21)  (Cosopt, Xalatan, and Timolol cause Myalgias)  (Alphagan causes redness) (zioptan too expensive)  2. PCIOL OU  3. Dry AMD  4. Fuch's Dystrophy   DSAEK OD 4/16 Dr. Quintanilla (followed by [facility] every summer)  DSAEK OS 8/15 Dr. Quintanilla  5. Dry Eye -intolerance to Restasis       Lotemax Mon, Wed, Fri and Sun once daily OU  Systane Ultra 4-5 tiimes daily  Travatan Z qhs os  Rhopressa qhs OS    Last edited by Carmelo Banegas, Patient Care Assistant on 12/17/2018 10:29   AM. (History)        Assessment /Plan :  1. Primary open angle glaucoma of both eyes, indeterminate stage Doing well, IOP OD within acceptable range relative to target IOP and no evidence of progression. Continue current treatment. Reviewed importance of continued compliance with treatment and follow up.    IOP OS not within acceptable range relative to target IOP with risk of irreversible visual loss. Better IOP control is recommended. Discussed options, risks, and benefits of additional medication, SLT laser, and/or incisional glaucoma surgery. Reviewed importance of continued compliance with treatment and follow up.     Patient chooses continue current drop regimen will recheck iop in 3 months     IOP much improved today but slightly above goal OS   2. Pseudophakia  -- Condition stable, no therapeutic change required. Monitoring routinely.     3. History of cornea transplant  -- Condition stable, no therapeutic change  required. Monitoring routinely.     4. Keratitis sicca, bilateral  -- Condition stable, no therapeutic change required. Monitoring routinely.         Return to clinic in 3 months  or as needed.  With Dilation, HVF 24-2 and SDP

## 2018-12-18 ENCOUNTER — TELEPHONE (OUTPATIENT)
Dept: DERMATOLOGY | Facility: CLINIC | Age: 83
End: 2018-12-18

## 2018-12-18 ENCOUNTER — CLINICAL SUPPORT (OUTPATIENT)
Dept: REHABILITATION | Facility: HOSPITAL | Age: 83
End: 2018-12-18
Attending: ORTHOPAEDIC SURGERY
Payer: MEDICARE

## 2018-12-18 DIAGNOSIS — Z96.612 STATUS POST TOTAL SHOULDER ARTHROPLASTY, LEFT: Primary | ICD-10-CM

## 2018-12-18 PROCEDURE — 97110 THERAPEUTIC EXERCISES: CPT | Mod: HCNC

## 2018-12-18 NOTE — TELEPHONE ENCOUNTER
S/w pt and answer all of her questions. Pt voiced understanding and had no further questions or concerns.

## 2018-12-18 NOTE — PROGRESS NOTES
PHYSICAL THERAPY Daily Note    Referring Provider:  Dr. Solomon Lujan    Diagnosis:       ICD-10-CM ICD-9-CM    1. Status post total shoulder arthroplasty, left Z96.612 V43.61             Orders:  Evaluate and Treat    Date of Initial Evaluation: 10-24-18      Visit # 14    SUBJECTIVE:    Pt. Reports BP being high the last few days and unable to do her exercises.  Pt. Reports BP currently 130/65.    Current Pain: 0/10      OBJECTIVE:    AROM  Shoulder flexion: 50%  Shoulder abduction: 40%  Ext. Rot: 75%  Int. Rot: WNL  Hor. Abd: 25%    ASSESSMENT:  Pt. Tolerated PROM well.  Pt. Still has limitations with L shoulder AROM which may be due to weakness in scapular muscles to achieve higher ROM.  Pt. Began shoulder extension strengthening.  No subscapularis tension noted but mild tension in L pectoral noted which could be limiting ext. Rot.  Began Pectoral stretches.  Cont. POC.      TREATMENT PROVIDED:  -Manual Therapy:  L shoulder PROM  X 5 min., L shoulder scapula mob x 3min.   -Therapeutic Exercise:  one on one 23 min., supervised x 7 min.   scap retraction x 2 min., lower trap x 2 min., middle trap x 2min. Biceps curl x 2 min., shoulder ext x 2 min., isometrics: flex x 2min., abd x 2min.; Theraband: ext. Rot x 2min., int. Rot x 2 min., AROM/AAROM x 5 min, flex stretch x 2min., abd stretch x 2 min., hor. Add stretch x 2min., pectoral stretch x 2min.   -Modalities: N/A  -Education on HEP and importance of performing HEP, condition and posture, activity modification    PLAN:  Patient will benefit from physical therapy (2-3) x/week for (10-11) weeks including manual therapy, therapeutic exercise, functional activities, modalities, and patient education.    Thank you for this referral.    These services are reasonable and necessary for the conditions set forth above while under my care.

## 2018-12-19 NOTE — PROGRESS NOTES
PHYSICAL THERAPY Daily Note    Referring Provider:  Dr. Solomon Lujan    Diagnosis:       ICD-10-CM ICD-9-CM    1. Status post total shoulder arthroplasty, left Z96.612 V43.61             Orders:  Evaluate and Treat    Date of Initial Evaluation: 10-24-18      Visit # 15    SUBJECTIVE:    Pt. Reports some pain in morning across upper back. Pt. Reports that she is able to sleep on L shoulder sometimes.      Current Pain: 0/10      OBJECTIVE:    PROM  Shoulder flexion: 85%  Shoulder abduction: WNL  Ext. Rot.  75%  Int. Rot: WNL  Hor. Add: 50%    Shoulder AROM  Flexion  60%  Abduction  90%  Ext. Rot. 50    Int. Rot. WNL  Hor. Add 5 degrees     ASSESSMENT:  Muscle tension noted L inferior shoulder which resolved with STM.  Muscle tension noted L prox. Biceps with mild resolve with STM. Pt. Educated on applying hot pack to L upper arm and inferior L shoulder.  Pt. Reports decreased supination with L elbow flexion since surgery.  Pt. Educated on how to stretch into supination.  Pt. Tender at L prox. Radioulnar joint secondary previous injury limiting mobilization.  Pt. Requires encouragement to hold stretches and perform full set of exercises with patients.  Pt. Complained of fatigue and left without completing scap retraction and rotator cuff strengthening therex and shoulder flexion and abduction strengthening.  Pt. Limited with progress secondary decreased motivation.      TREATMENT PROVIDED:  -Manual Therapy:  L shoulder PROM  X 5 min., L shoulder scapula mob x 3min., STM L pectorals and L prox. Biceps tendon x 6 min.   -Therapeutic Exercise:  one on one 25 min.:  lower trap x 2 min., middle trap x 2min. Biceps curl x 2 min., shoulder ext x 2 min.,  Rot x 2 min., AROM/AAROM x 5 min, flex stretch x 2min., abd stretch x 2 min., hor. Add stretch x 2min., pectoral stretch x 2min., shld flex stretch on mat x 2 min.    -Modalities: N/A  -Education on HEP and importance of performing HEP, condition and posture, activity  modification    PLAN:  Patient will benefit from physical therapy (2-3) x/week for (10-11) weeks including manual therapy, therapeutic exercise, functional activities, modalities, and patient education.    Thank you for this referral.    These services are reasonable and necessary for the conditions set forth above while under my care.

## 2018-12-21 ENCOUNTER — CLINICAL SUPPORT (OUTPATIENT)
Dept: REHABILITATION | Facility: HOSPITAL | Age: 83
End: 2018-12-21
Attending: ORTHOPAEDIC SURGERY
Payer: MEDICARE

## 2018-12-21 DIAGNOSIS — Z96.612 STATUS POST TOTAL SHOULDER ARTHROPLASTY, LEFT: Primary | ICD-10-CM

## 2018-12-21 PROCEDURE — 97140 MANUAL THERAPY 1/> REGIONS: CPT | Mod: HCNC

## 2018-12-21 PROCEDURE — 97110 THERAPEUTIC EXERCISES: CPT | Mod: HCNC

## 2018-12-27 ENCOUNTER — TELEPHONE (OUTPATIENT)
Dept: DERMATOLOGY | Facility: CLINIC | Age: 83
End: 2018-12-27

## 2018-12-27 NOTE — TELEPHONE ENCOUNTER
----- Message from Lisandra Nguyen sent at 12/27/2018 10:14 AM CST -----  Pt is returning a call to nurse.          Please call pt back at [phone]

## 2018-12-27 NOTE — TELEPHONE ENCOUNTER
Returned call to pt and reviewed biopsy results.  Pt verbalized understanding to all information given.

## 2018-12-28 DIAGNOSIS — G47.00 INSOMNIA, UNSPECIFIED TYPE: ICD-10-CM

## 2018-12-28 DIAGNOSIS — F41.9 ANXIETY: ICD-10-CM

## 2018-12-28 RX ORDER — ALPRAZOLAM 0.5 MG/1
TABLET ORAL
Qty: 30 TABLET | Refills: 2 | Status: SHIPPED | OUTPATIENT
Start: 2018-12-28 | End: 2019-07-09 | Stop reason: SDUPTHER

## 2019-01-02 NOTE — TELEPHONE ENCOUNTER
Patient informed of her medication being sent to the pharmacy and verbally understood the information given.

## 2019-01-11 ENCOUNTER — CLINICAL SUPPORT (OUTPATIENT)
Dept: REHABILITATION | Facility: HOSPITAL | Age: 84
End: 2019-01-11
Attending: ORTHOPAEDIC SURGERY
Payer: MEDICARE

## 2019-01-11 DIAGNOSIS — Z96.612 STATUS POST TOTAL SHOULDER ARTHROPLASTY, LEFT: Primary | ICD-10-CM

## 2019-01-11 PROCEDURE — 97164 PT RE-EVAL EST PLAN CARE: CPT | Mod: HCNC

## 2019-01-11 PROCEDURE — G8985 CARRY GOAL STATUS: HCPCS | Mod: CI,HCNC

## 2019-01-11 PROCEDURE — G8984 CARRY CURRENT STATUS: HCPCS | Mod: CJ,HCNC

## 2019-01-11 PROCEDURE — 97140 MANUAL THERAPY 1/> REGIONS: CPT | Mod: HCNC

## 2019-01-11 NOTE — PLAN OF CARE
PHYSICAL THERAPY Re- EVALUATION    Referring Provider:  Dr. Solomon Lujan    Diagnosis:       ICD-10-CM ICD-9-CM    1. Status post total shoulder arthroplasty, left Z96.612 V43.61             Orders:  Evaluate and Treat    Date of Initial Evaluation: 10-24-18      Visit # 16    SUBJECTIVE:    Pt. Reports still having problems with blood pressure being elevated which is elevated today.  Patient reports skin cancer will be removed next Tuesday.        Current Pain:  Feels pretty good, does ache at night    OBJECTIVE:    Sensation: decreased sensation medial and lateral forearm and hand            Eval    Re-Eval   Re-Eval 1-11-9         L    L    L  Shoulder A/PROM: Flexion     65 pain    80    110     Extension   WNL     WNL    WNL     Abduction   60 pain    90    105     Adduction   Lack 35 degrees   10    10     Internal Rotation  WNL neutral    WNL abd 90 deg.  WNL     External Rotation  0 degrees neutral  20 deg, 60 deg abd 90 25 deg. Neutral, 35 deg abd 90    Eval- L elbow flex: 115  Eval- L shoulder int. Rot functional reach L hip   Re-Eval L shoulder int. Rot functional reach L glut  Re-Eval- S1    R UE AROM: R UE ROM 75%  Flexion and abduction, WNL ext. And int. Rot              R shoulder int. Rot functional reach  T 8               Eval  Re-Eval  Re-Eval 1-11-19         L   L   L  Strength:  Supraspinatus   1+/5  1+/5   1+/5     Subscapularis   1+/5  3+/5   3+/5     Latissimus dorsi   NT  NT   NT     Rhomboids   NT  3+/5   3+/5     Upper Trapezius  NT  NT   B 5/5     Middle Trapezius  NT  3+/5   3+/5     Lower Trapezius  1+/5  1+/5   1+/5     Levator scapula  NT  NT   NT     Biceps    NT  4/5   4+/5     Triceps   NT  4/5   3/5     Flexors    1+/5  1+/5   1+/5       Abductors   1+/5  1+/5   1+/5           STT (selective tension test)- NT    Special test:  NT    GHJ mobility: pain at end range of all planes of movement  AC mobility: NT  ST mobility: NT      Tenderness to palpation:  NT      Quick DASH  Shoulder Questionnaire           Score 1-5         Re-Eval            Re-Eval 1-11-19  1. Opening a tight jar       4/5    4/5  3/5  2. Do heavy household chores     NA    4/5 4/5  3. Carry a shopping bag or briefcase     4/5    2/5  3/5  4. Wash your back      3/5    4/5  4/5  5. Use a knife to cut food     3/5    NA  3/5  6. Recreactional activities requiring force   5/5 4/5  7. Social Limitation      4/5 2/5 2/5  8. Work/ADL limitation      3/5    1/5  2/5  9. Arm, Shoulder or hand Pain    3/5    2/5  3/5  10. Tingling       2/5 1/5 1/5  11. Sleeping Limitation      2/5 1/5 1/5         Function: Patient reports 37.5% disability based on score of the Quick DASH. Eval    Patient reports 40% disability based on score of the Quick DASH. Re-Eval    Functional Limitations and Goal:   This patient's primary Physical Therapy goal is to improve his current level of function(carrying,moving and handling objects ) with minimal limitations. The patient's current level of impairment is 20-40% Impaired(CJ) based on their score of 40% impaired on the Quick DASH. The Patient is expected to achieve a score of 1-20%(CI) within 10 treatments.    ASSESSMENT:  Pt. Had made progress since last re-eval.  Pt. Has less pain in L shoulder.  Pt. Has increased L shoulder ROM.  Pt.'s strength in her L shoulder has improved however scapula strength is still weak.  Cont. Skilled PT needed to increase R shoulder strength and ROM to reach into overhead cabinets and perform overhead tasks without discomfort.  Pt. Reports unable to do therex today secondary increased BP.      Co-morbidities which may impact the plan of care and potentially impede the patient's progress in therapy include:  Patients CLINICAL PRESENTATION is STABLE. Pt. Has multiple comorbidities.  Pt. Had 2 body systems examined.  Pt. Has low complexity eval.     Short Term Goals:  1-4 weeks  1. I with HEP Met  2.60% or greater and pain free  Shoulder AROM  Progressing      Long Term Goals: 5-12 weeks  1. Return to activities without complains of pain and dysfunction Not Met  2. Increase L shoulder/ UE strength to 4+/5 to perform lifting and carrying activities. Not Met  3. Increase L shoulder AROM to 75% or greater to reach into overhead cabinet.  Not Met    TREATMENT PROVIDED:  -Manual Therapy:  L shoulder PROM  X 5 min., STM x 5 min. To B UT., UT stretch x 2 min., B levator stretch x 2min., L scapula mob x 2 min.  -Therapeutic Exercise: N/A  -Modalities: N/A  -Re-Evaluation completed  -Education on condition and posture, activity modification    PLAN:  Patient will benefit from physical therapy (2-3) x/week for (4-6) weeks including manual therapy, therapeutic exercise, functional activities, modalities, and patient education.    Thank you for this referral.    These services are reasonable and necessary for the conditions set forth above while under my care.

## 2019-01-14 ENCOUNTER — CLINICAL SUPPORT (OUTPATIENT)
Dept: REHABILITATION | Facility: HOSPITAL | Age: 84
End: 2019-01-14
Attending: ORTHOPAEDIC SURGERY
Payer: MEDICARE

## 2019-01-14 DIAGNOSIS — Z96.612 STATUS POST TOTAL SHOULDER ARTHROPLASTY, LEFT: Primary | ICD-10-CM

## 2019-01-14 PROCEDURE — 97140 MANUAL THERAPY 1/> REGIONS: CPT | Mod: HCNC

## 2019-01-14 PROCEDURE — 97110 THERAPEUTIC EXERCISES: CPT | Mod: HCNC

## 2019-01-14 NOTE — PROGRESS NOTES
PHYSICAL THERAPY Daily Note    Referring Provider:  Dr. Solomon Lujan    Diagnosis:       ICD-10-CM ICD-9-CM    1. Status post total shoulder arthroplasty, left Z96.612 V43.61             Orders:  Evaluate and Treat    Date of Initial Evaluation: 10-24-18      Visit # 17    SUBJECTIVE:    Pt. Reports that her BP dropped today but did return to normal now.  Pt reports some discomfort in L shoulder at night.  Pt. Reports being able to sleep on L shoulder now.         Current Pain:  Not reported today     OBJECTIVE:  AROM Shoulder  Flexion: 75%  Abduction 50%  Ext. Rot WNL  Int. Rot WNL  Hor. Add WNL    ASSESSMENT:  Pt.'s L shoulder flexion and horizontal adduction is improving well.  Pt. Still has moderate AROM limitations with L shoulder abduction.  No muscle tension noted in L biceps or L pectoral muscle today.  Pt.has most fatigue with lower trapezius strengthening.  Pt. Has discomfort also with shoulder abduction stretch.  Pt. Began shoulder flexion and abduction strengthening with resistance band today.      TREATMENT PROVIDED:  -Manual Therapy:  L shoulder PROM  X 5 min.,  L AC joint mob x 2 min., L scapula mob x 2 min.  -Therapeutic Exercise: one on one 27 min.:  lower trap x 2 min., middle trap x 2min., Biceps curl x 2 min., Theraband: flex x 2min., abd x 2 min., shoulder ext x 2 min.,  ext. Rot x 2 min. Int. Rot x 2 min., AROM/AAROM x 5 min, flex stretch x 2min., abd stretch x 2 min., shld flex stretch on mat x 2 min.    -Modalities: N/A  -Education on condition and posture, activity modification    PLAN:  Patient will benefit from physical therapy (2-3) x/week for (4-6) weeks including manual therapy, therapeutic exercise, functional activities, modalities, and patient education.    Thank you for this referral.    These services are reasonable and necessary for the conditions set forth above while under my care.

## 2019-01-17 NOTE — PROGRESS NOTES
PHYSICAL THERAPY Daily Note    Referring Provider:  Dr. Solomon Lujan    Diagnosis:       ICD-10-CM ICD-9-CM    1. Status post total shoulder arthroplasty, left Z96.612 V43.61             Orders:  Evaluate and Treat    Date of Initial Evaluation: 10-24-18      Visit # 18    SUBJECTIVE:    Pt. Reports back pain today after lifting boxes.  Pt. Reports that her L shoulder is feeling good.  Mild pain R UT region.        Current Pain:  Not reported today     OBJECTIVE:    AROM Shoulder:  Flexion: 50%  Abduction 50%  Ext. Rot WNL  Int. Rot WNL  Hor. Add WNL    ASSESSMENT:  Pt. Still has moderate limitations with L shoulder flexion and abduction ROM.  Pt. Has some tenderness with L shoulder inferior joint mobilization.  Pain in low back with active L ext. Rot.  Pt.  Reported discomfort L olecranon and was found to have tightness at prox. Extensor digitorum- STM began.  Pt. Reported discomfort R UT region- STM performed.  Pt. Did not want to perform exercise today secondary her back hurting.  Pt. Educated on donning cold pack to low back secondary acute injury.  Pt. Educated to cont. Lower trap strengthening to be able to reach overhead.      TREATMENT PROVIDED:  -Manual Therapy:  L shoulder PROM  X 5 min.,  L AC joint mob x 2 min., L scapula mob x 2 min., STM x L prox. Extensor digitorum and R UT x 8 min.   -Therapeutic Exercise: one on one 27 min.:  AROM x 8 min.  -Modalities: N/A  -Education on condition and posture, activity modification    PLAN:  Patient will benefit from physical therapy (2-3) x/week for (4-6) weeks including manual therapy, therapeutic exercise, functional activities, modalities, and patient education.    Thank you for this referral.    These services are reasonable and necessary for the conditions set forth above while under my care.

## 2019-01-18 ENCOUNTER — CLINICAL SUPPORT (OUTPATIENT)
Dept: REHABILITATION | Facility: HOSPITAL | Age: 84
End: 2019-01-18
Attending: ORTHOPAEDIC SURGERY
Payer: MEDICARE

## 2019-01-18 DIAGNOSIS — Z96.612 STATUS POST TOTAL SHOULDER ARTHROPLASTY, LEFT: Primary | ICD-10-CM

## 2019-01-18 PROCEDURE — 97140 MANUAL THERAPY 1/> REGIONS: CPT | Mod: HCNC

## 2019-01-18 PROCEDURE — 97110 THERAPEUTIC EXERCISES: CPT | Mod: HCNC

## 2019-01-21 ENCOUNTER — TELEPHONE (OUTPATIENT)
Dept: RHEUMATOLOGY | Facility: CLINIC | Age: 84
End: 2019-01-21

## 2019-01-21 NOTE — PROGRESS NOTES
"Subjective:       Patient ID: Shirley Metz is a 86 y.o. female.    Chief Complaint: osteoarthritis    Shirley Rg is seen for osteoarthritis.  She has multiple joint pain. L shoulder replaced since last seen.  She has been in PT ([facility] [address]) for her joints in the past, she does home exercises which help.  Muscle weakness started with statin therapy years ago, has never been back to normal.  She is very sensitive to multiple medications including nsaids, prednisone. She avoids steroid shots.  She does take tylenol prn and takes tumeric daily. She uses castor oil for her joints. She really likes to stay natural and avoid adding new medications.  Has had bad experience with steroids in the past.   She uses KT strips (otc tape) which helps her joints.  Uses Arnica pills and ointment and this sig helps her joint pain.     Today she has some low back pain, strained muscle lifting heavy objects a few days ago. No radiating pain into her legs.  No worsening weakness.  Using ice for her back pain.         Review of Systems   Constitutional: Negative for chills, fatigue and fever.   HENT: Negative for mouth sores, rhinorrhea and sore throat.    Eyes: Negative for pain and redness.        Glaucoma   Respiratory: Negative for cough and shortness of breath.    Cardiovascular: Negative for chest pain.   Gastrointestinal: Negative for abdominal pain, constipation, diarrhea, nausea and vomiting.   Genitourinary: Negative for dysuria and hematuria.   Musculoskeletal: Positive for arthralgias and myalgias. Negative for joint swelling.        Left wrist trapeziectomy   Skin: Negative for rash.   Neurological: Negative for weakness, numbness and headaches.   Psychiatric/Behavioral: The patient is not nervous/anxious.          Objective:   BP (!) 142/60   Pulse 74   Ht 5' 4" (1.626 m)   Wt 77 kg (169 lb 12.1 oz)   BMI 29.14 kg/m²      Physical Exam   Constitutional: She is oriented to person, place, and " time and well-developed, well-nourished, and in no distress.   HENT:   Head: Normocephalic and atraumatic.   Eyes: Pupils are equal, round, and reactive to light. Right eye exhibits no discharge.   Neck: Normal range of motion.   Cardiovascular: Normal rate, regular rhythm and normal heart sounds.  Exam reveals no friction rub.    Pulmonary/Chest: Effort normal and breath sounds normal. No respiratory distress.   Abdominal: Soft. She exhibits no distension. There is no tenderness.   Lymphadenopathy:     She has no cervical adenopathy.   Neurological: She is alert and oriented to person, place, and time.   Skin: No rash noted. No erythema.     Psychiatric: Mood normal.   Musculoskeletal: Normal range of motion. She exhibits no edema, tenderness or deformity.   Mild oa changes to bli wrists, pip and dips, no synovitis;  Left wrist volar surface with some soft tissue bulging at the radial aspect (prior surgery, trapeziectomy)  L shoulder replaced   Right shoulder full rom  donna knees no effusion, full rom    Mild low back tenderness right and left paraspinals           Assessment:       1. Primary osteoarthritis involving multiple joints    2. Chronic kidney disease, stage 3    3. Myalgia        Impression:  OA multiple joints including donna hands L shoulder: now s/p L TSA,  reactions to multiple meds in the past, avoids nsaids, topical nsaids, prednisone, steroid shots; does take tumeric, uses castor oil, KT strips otc, arnica supplement    Low back pain: recent muscle strain, no radiation, using ice    Myalgia: generalized, h/o rxn to statin with residual weakness     Sensitivity to multiple medications: long list of med allergies, doesn't like to take much    CKD: avoid nsaids    Plan:       Recommend heat for her low back strain, thermacare patches as well otc  Continue with her natural supplements for her OA, she wont take rx meds really  Cont tumeric arnica, omega 3 etc  Ok to take tylenol         See back prn

## 2019-01-22 ENCOUNTER — PROCEDURE VISIT (OUTPATIENT)
Dept: DERMATOLOGY | Facility: CLINIC | Age: 84
End: 2019-01-22
Payer: MEDICARE

## 2019-01-22 ENCOUNTER — OFFICE VISIT (OUTPATIENT)
Dept: RHEUMATOLOGY | Facility: CLINIC | Age: 84
End: 2019-01-22
Payer: MEDICARE

## 2019-01-22 VITALS
BODY MASS INDEX: 28.98 KG/M2 | HEART RATE: 74 BPM | WEIGHT: 169.75 LBS | SYSTOLIC BLOOD PRESSURE: 142 MMHG | DIASTOLIC BLOOD PRESSURE: 60 MMHG | HEIGHT: 64 IN

## 2019-01-22 DIAGNOSIS — C44.629 SQUAMOUS CELL CANCER OF SKIN OF LEFT SHOULDER: Primary | ICD-10-CM

## 2019-01-22 DIAGNOSIS — M15.9 PRIMARY OSTEOARTHRITIS INVOLVING MULTIPLE JOINTS: Primary | ICD-10-CM

## 2019-01-22 DIAGNOSIS — N18.30 CHRONIC KIDNEY DISEASE, STAGE 3: ICD-10-CM

## 2019-01-22 DIAGNOSIS — D48.5 NEOPLASM OF UNCERTAIN BEHAVIOR OF SKIN: ICD-10-CM

## 2019-01-22 DIAGNOSIS — S39.012A STRAIN OF LUMBAR REGION, INITIAL ENCOUNTER: ICD-10-CM

## 2019-01-22 DIAGNOSIS — L71.9 ROSACEA: ICD-10-CM

## 2019-01-22 DIAGNOSIS — M79.10 MYALGIA: ICD-10-CM

## 2019-01-22 PROCEDURE — 12032 PR LAYR CLOS WND TRUNK,ARM,LEG 2.6-7.5 CM: ICD-10-PCS | Mod: 59,HCNC,S$GLB, | Performed by: DERMATOLOGY

## 2019-01-22 PROCEDURE — 99999 PR PBB SHADOW E&M-EST. PATIENT-LVL V: CPT | Mod: PBBFAC,HCNC,, | Performed by: PHYSICIAN ASSISTANT

## 2019-01-22 PROCEDURE — 1101F PR PT FALLS ASSESS DOC 0-1 FALLS W/OUT INJ PAST YR: ICD-10-PCS | Mod: CPTII,HCNC,S$GLB, | Performed by: PHYSICIAN ASSISTANT

## 2019-01-22 PROCEDURE — 99499 RISK ADDL DX/OHS AUDIT: ICD-10-PCS | Mod: S$GLB,,, | Performed by: PHYSICIAN ASSISTANT

## 2019-01-22 PROCEDURE — 99499 NO LOS: ICD-10-PCS | Mod: HCNC,S$GLB,, | Performed by: DERMATOLOGY

## 2019-01-22 PROCEDURE — 99213 PR OFFICE/OUTPT VISIT, EST, LEVL III, 20-29 MIN: ICD-10-PCS | Mod: HCNC,S$GLB,, | Performed by: PHYSICIAN ASSISTANT

## 2019-01-22 PROCEDURE — 11102 TANGNTL BX SKIN SINGLE LES: CPT | Mod: 59,HCNC,S$GLB, | Performed by: DERMATOLOGY

## 2019-01-22 PROCEDURE — 88305 TISSUE EXAM BY PATHOLOGIST: CPT | Mod: HCNC,59 | Performed by: PATHOLOGY

## 2019-01-22 PROCEDURE — 99499 UNLISTED E&M SERVICE: CPT | Mod: S$GLB,,, | Performed by: PHYSICIAN ASSISTANT

## 2019-01-22 PROCEDURE — 88305 TISSUE EXAM BY PATHOLOGIST: CPT | Mod: 26,HCNC,, | Performed by: PATHOLOGY

## 2019-01-22 PROCEDURE — 88305 TISSUE SPECIMEN TO PATHOLOGY, DERMATOLOGY: ICD-10-PCS | Mod: 26,HCNC,, | Performed by: PATHOLOGY

## 2019-01-22 PROCEDURE — 99999 PR PBB SHADOW E&M-EST. PATIENT-LVL V: ICD-10-PCS | Mod: PBBFAC,HCNC,, | Performed by: PHYSICIAN ASSISTANT

## 2019-01-22 PROCEDURE — 99213 OFFICE O/P EST LOW 20 MIN: CPT | Mod: HCNC,S$GLB,, | Performed by: PHYSICIAN ASSISTANT

## 2019-01-22 PROCEDURE — 11102 PR TANGENTIAL BIOPSY, SKIN, SINGLE LESION: ICD-10-PCS | Mod: 59,HCNC,S$GLB, | Performed by: DERMATOLOGY

## 2019-01-22 PROCEDURE — 1101F PT FALLS ASSESS-DOCD LE1/YR: CPT | Mod: CPTII,HCNC,S$GLB, | Performed by: PHYSICIAN ASSISTANT

## 2019-01-22 PROCEDURE — 11602 EXC TR-EXT MAL+MARG 1.1-2 CM: CPT | Mod: HCNC,S$GLB,, | Performed by: DERMATOLOGY

## 2019-01-22 PROCEDURE — 12032 INTMD RPR S/A/T/EXT 2.6-7.5: CPT | Mod: 59,HCNC,S$GLB, | Performed by: DERMATOLOGY

## 2019-01-22 PROCEDURE — 11602 PR EXC SKIN MALIG 1.1-2 CM TRUNK,ARM,LEG: ICD-10-PCS | Mod: HCNC,S$GLB,, | Performed by: DERMATOLOGY

## 2019-01-22 PROCEDURE — 99499 UNLISTED E&M SERVICE: CPT | Mod: HCNC,S$GLB,, | Performed by: DERMATOLOGY

## 2019-01-22 RX ORDER — METRONIDAZOLE 7.5 MG/G
GEL TOPICAL 2 TIMES DAILY
Qty: 45 G | Refills: 3 | Status: SHIPPED | OUTPATIENT
Start: 2019-01-22 | End: 2019-03-12

## 2019-01-22 NOTE — PROGRESS NOTES
Subjective:       Patient ID:  Shirley Metz is a 86 y.o. female who presents for No chief complaint on file.    HPI    Review of Systems     Objective:    Physical Exam       Diagram Legend     Erythematous scaling macule/papule c/w actinic keratosis       Vascular papule c/w angioma      Pigmented verrucoid papule/plaque c/w seborrheic keratosis      Yellow umbilicated papule c/w sebaceous hyperplasia      Irregularly shaped tan macule c/w lentigo     1-2 mm smooth white papules consistent with Milia      Movable subcutaneous cyst with punctum c/w epidermal inclusion cyst      Subcutaneous movable cyst c/w pilar cyst      Firm pink to brown papule c/w dermatofibroma      Pedunculated fleshy papule(s) c/w skin tag(s)      Evenly pigmented macule c/w junctional nevus     Mildly variegated pigmented, slightly irregular-bordered macule c/w mildly atypical nevus      Flesh colored to evenly pigmented papule c/w intradermal nevus       Pink pearly papule/plaque c/w basal cell carcinoma      Erythematous hyperkeratotic cursted plaque c/w SCC      Surgical scar with no sign of skin cancer recurrence      Open and closed comedones      Inflammatory papules and pustules      Verrucoid papule consistent consistent with wart     Erythematous eczematous patches and plaques     Dystrophic onycholytic nail with subungual debris c/w onychomycosis     Umbilicated papule    Erythematous-base heme-crusted tan verrucoid plaque consistent with inflamed seborrheic keratosis     Erythematous Silvery Scaling Plaque c/w Psoriasis     See annotation      Assessment / Plan:        There are no diagnoses linked to this encounter.         No Follow-up on file.

## 2019-01-22 NOTE — PATIENT INSTRUCTIONS
Wound Care    A pressure dressing and vaseline ointment has been placed over the site.  This should remain in place for 48 hours.  It is recommended that you keep the area dry for the first 48 hours.  After 48 hours, you may remove the bandage and wash the area with warm soap and water, apply Vaseline, and keep covered with a bandage.  This should be repeated every 24 hours. Many patients prefer to use Neosporin or Bacitracin ointment.  This is acceptable; however, know that you can develop an allergy to this medication even if you have used it safely for years.  It is important to keep the area moist.  Letting it dry out and get air slows healing time, and will worsen the scar.  Keep the areas covered with a bandage until sutures are removed. Sutures will be removed in 1 week for face sutures and 2 weeks for body sutures unless otherwise specified.      If you notice increasing redness, tenderness, pain, or yellow drainage at the site, please notify your doctor.  These are signs of an infection.    If your site is bleeding, apply firm pressure for 15 minutes straight.  Repeat for another 15 minutes, if it is still bleeding.   If the surgical site continues to bleed, then please contact your doctor.      [facility]   DERMATOLOGY  [address] [address]   Dept: [phone]   Dept Fax: [phone]     Shave Biopsy Wound Care    Your doctor has performed a shave biopsy today.  A band aid and vaseline ointment has been placed over the site.  This should remain in place for 24 hours.  It is recommended that you keep the area dry for the first 24 hours.  After 24 hours, you may remove the band aid and wash the area with warm soap and water and apply Vaseline jelly.  Many patients prefer to use Neosporin or Bacitracin ointment.  This is acceptable; however, know that you can develop an allergy to this medication even if you have used it safely for  years.  It is important to keep the area moist.  Letting it dry out and get air slows healing time, and will worsen the scar.  Band aid is optional after first 24 hours.      If you notice increasing redness, tenderness, pain, or yellow drainage at the biopsy site, please notify your doctor.  These are signs of an infection.    If your biopsy site is bleeding, apply firm pressure for 15 minutes straight.  Repeat for another 15 minutes, if it is still bleeding.   If the surgical site continues to bleed, then please contact your doctor.      [facility]   DERMATOLOGY  [address] [address]   Dept: [phone]   Dept Fax: [phone]

## 2019-01-22 NOTE — PROGRESS NOTES
PROCEDURE #1: Elliptical excision with intermediate layered repair    ANESTHETIC: 7 cc 2% Lidocaine with Epinephrine 1:100,000    SURGICAL PREP: Betadine    SURGEON: Rosario Valadez MD    ASSISTANTS: Yvonne Vicente LPN     PREOPERATIVE DIAGNOSIS: SCC    POSTOPERATIVE DIAGNOSIS: SCC    PATHOLOGIC DIAGNOSIS: Pending    LOCATION: left posterior shoulder    INITIAL LESION SIZE: 1.0 cm    EXCISED DIAMETER: 2.0 cm    PREPARATION:  The diagnosis, procedure, alternatives, benefits and risks, including but not limited to: drug reactions, pain, scar or cosmetic defect, local sensation disturbances, and/or recurrence of present condition were explained to the patient. The patient elected to proceed.    PROCEDURE:  The area of the left posterior shoulder was prepped, draped, and anesthetized in the usual sterile fashion. Lesional tissue was carefully marked prior to administration of the anesthesia. An elliptical excision was drawn around the lesion with margins. A fusiform elliptical excision was done with a #15 blade carried down completely through the dermis into the subcutaneous tissues. Then, with a combination of blunt and sharp dissection, the lesion was removed.  The specimen was marked at the 12 o'clock position with suture and submitted for histologic evaluation. The operative site was widely undermined in the subcutaneous tissue plane. Then, electrocoagulation was used to obtain hemostasis. Blood loss was minimal. The wound was then approximated in a layered fashion with subcutaneous and intradermal 3-0 Vicryl sutures. The wound was then superficially closed with interrupted 3-0 Ethilon sutures.    The patient tolerated the procedure well.    The area was cleaned and dressed appropriately and the patient was given wound care instructions, as well as an appointment for follow-up evaluation.    LENGTH OF REPAIR: 4.3 cm          PROCEDURE #2:     HPI:  Pt c/o growth of possible scar of the left-mid back at prior skin  cancer site for several months.  Pt with hx of BCC s/p ED&C 5/2018.  ILK done at last visit for possible hypertrophic scar.  She states the areas has continued to be indurated, concern for skin cancer recurrence.     PE: 3 mm indurated papule within surgical scar of the left mid-back    A/P:    NUB -     Shave biopsy(-ies) done of 1 site(s).   Patient informed to call for results within 2 weeks if have not received notification via telephone call or [employer]    PROCEDURE NOTE - SHAVE BIOPSY   Location: see above    After risk, benefits, and alternatives were discussed with the patient, the patient agrees to the procedure by verbal informed consent.  The area(s) were cleansed with alcohol. 2 cc of lidocaine 1% with epinephrine was injected for local anesthesia into each lesion(s).  A sharp dermablade was used to remove part or all of the lesion(s).  The specimen(s) will be sent for tissue pathology.  Hemostasis was obtained with aluminum chloride and/or hyfrecation.  The area(s) were dressed with vaseline ointment and bandaged.  The patient tolerated the procedure well without adverse events.  Wound care instructions were given to the patient on the AVS.  The patient will be notified of pathology results once available. Results will also be available in Epic.        Follow-up in about 2 weeks (around 2/5/2019).

## 2019-01-22 NOTE — PATIENT INSTRUCTIONS
Thermacare patches over the counter   Or heat for low back     Cont natural supplements   instaflex

## 2019-01-30 ENCOUNTER — CLINICAL SUPPORT (OUTPATIENT)
Dept: REHABILITATION | Facility: HOSPITAL | Age: 84
End: 2019-01-30
Attending: ORTHOPAEDIC SURGERY
Payer: MEDICARE

## 2019-01-30 DIAGNOSIS — Z96.612 STATUS POST TOTAL SHOULDER ARTHROPLASTY, LEFT: Primary | ICD-10-CM

## 2019-01-30 PROCEDURE — 97140 MANUAL THERAPY 1/> REGIONS: CPT | Mod: HCNC

## 2019-01-30 NOTE — PROGRESS NOTES
PHYSICAL THERAPY Daily Note    Referring Provider:  Dr. Solomon Lujan    Diagnosis:       ICD-10-CM ICD-9-CM    1. Status post total shoulder arthroplasty, left Z96.612 V43.61             Orders:  Evaluate and Treat    Date of Initial Evaluation: 10-24-18      Visit # 19    SUBJECTIVE:    Pt. Reports skin cancer removed last Thursday.  Pt. Reports that she was instructed that she can't do anything to pull on those muscles around the debrided area until stitches are removed and reassessed.      Current Pain:  Not reported today     OBJECTIVE:    PROM Shoulder:  Flexion: 50%  Abduction 60%  Ext. Rot 75%  Int. Rot WNL  Hor. Add WNL    ASSESSMENT:  Pt. Has had a decrease in PROM of L shoulder flexion, abduction, and external rotation.  Pt. Did not want to do any active exercises but reports that she's been performing scapula elevation and depression and protraction and retraction type exercises.  Pt. Did have mild muscle tension proximal L biceps and L lateral pectoral muscle that resolved with STM.  Pt. Reports discomfort in neck so manual stretches and STM performed. Pt. Reports more tension on R side cervical region than left.  Pt. Educated on obtaining a referral for PT for cervical pain for further treatment.  Pt. Educated on importance of strengthening to increase ROM in L shoulder. Cont. POC.      TREATMENT PROVIDED:  -Manual Therapy:  L shoulder PROM  X 5 min., STM B cervical paraspinals and B UT and L prox. Biceps x L lateral pectoral muscle x 12 min., B UT stretch x 4min., B levator stretch x 4 min.   -Therapeutic Exercise: N/A  -Modalities: N/A  -Education on condition and posture, activity modification    PLAN:  Patient will benefit from physical therapy (2-3) x/week for (4-6) weeks including manual therapy, therapeutic exercise, functional activities, modalities, and patient education.    Thank you for this referral.    These services are reasonable and necessary for the conditions set forth above  while under my care.

## 2019-02-04 ENCOUNTER — CLINICAL SUPPORT (OUTPATIENT)
Dept: REHABILITATION | Facility: HOSPITAL | Age: 84
End: 2019-02-04
Attending: ORTHOPAEDIC SURGERY
Payer: MEDICARE

## 2019-02-04 DIAGNOSIS — Z96.612 STATUS POST TOTAL SHOULDER ARTHROPLASTY, LEFT: Primary | ICD-10-CM

## 2019-02-04 PROCEDURE — 97140 MANUAL THERAPY 1/> REGIONS: CPT | Mod: HCNC

## 2019-02-04 NOTE — PROGRESS NOTES
PHYSICAL THERAPY Daily Note    Referring Provider:  Dr. Solomon Lujan    Diagnosis:       ICD-10-CM ICD-9-CM    1. Status post total shoulder arthroplasty, left Z96.612 V43.61             Orders:  Evaluate and Treat    Date of Initial Evaluation: 10-24-18      Visit # 20    SUBJECTIVE:   Pt. Reports bumping L shoulder a few times but no increased pain or bruising.  Pt. Wants to do same for last treatment and will return to doctor tomorrow to get stitches removed from skin lesion.       Current Pain:  Not reported today     OBJECTIVE:    PROM Shoulder:  Flexion: 75%  Abduction 65%  Ext. Rot 85%  Int. Rot WNL  Hor. Add WNL    ASSESSMENT:  Pt. Able to tolerate greater L shoulder PROM and AROM today.  Pt. Cont. To report pain in cervical region which may be due from guarding after shoulder surgery and skin lesion removal.  Moderate muscle tension noted B UT and mild muscle tension B cervical paraspinals.  Pt. Educated on maintaining upright posture for upper body to help decrease muscle tension.      TREATMENT PROVIDED:  -Manual Therapy:  L shoulder PROM  X 5 min., STM B cervical paraspinals and B UT and L prox. Biceps x L lateral pectoral muscle x 10 min., B UT stretch x 4 min., B levator stretch x 4 min.   -Therapeutic Exercise: AROM L shoulder x 5 min.  -Modalities: N/A  -Education on condition and posture, activity modification    PLAN:  Patient will benefit from physical therapy (2-3) x/week for (4-6) weeks including manual therapy, therapeutic exercise, functional activities, modalities, and patient education.    Thank you for this referral.    These services are reasonable and necessary for the conditions set forth above while under my care.

## 2019-02-05 ENCOUNTER — CLINICAL SUPPORT (OUTPATIENT)
Dept: DERMATOLOGY | Facility: CLINIC | Age: 84
End: 2019-02-05
Payer: MEDICARE

## 2019-02-05 DIAGNOSIS — Z48.02 VISIT FOR SUTURE REMOVAL: Primary | ICD-10-CM

## 2019-02-05 PROCEDURE — 99024 PR POST-OP FOLLOW-UP VISIT: ICD-10-PCS | Mod: HCNC,S$GLB,, | Performed by: DERMATOLOGY

## 2019-02-05 PROCEDURE — 99024 POSTOP FOLLOW-UP VISIT: CPT | Mod: HCNC,S$GLB,, | Performed by: DERMATOLOGY

## 2019-02-05 NOTE — PROGRESS NOTES
Patient presents for suture removal. The wound is well healed without signs of infection.  The sutures are removed. Wound care and activity instructions given. Return 3 months.

## 2019-02-06 ENCOUNTER — PATIENT MESSAGE (OUTPATIENT)
Dept: ORTHOPEDICS | Facility: CLINIC | Age: 84
End: 2019-02-06

## 2019-02-06 DIAGNOSIS — Z96.612 STATUS POST SHOULDER REPLACEMENT, LEFT: Primary | ICD-10-CM

## 2019-02-06 NOTE — PROGRESS NOTES
PHYSICAL THERAPY Daily Note    Referring Provider:  Dr. Solomon Lujan    Diagnosis:       ICD-10-CM ICD-9-CM    1. Status post total shoulder arthroplasty, left Z96.612 V43.61             Orders:  Evaluate and Treat    Date of Initial Evaluation: 10-24-18      Visit # 21    SUBJECTIVE:   Pt. Reports she was cleared to perform all movements from dermatology doctor visit earlier this week.       Current Pain:  Not reported today     OBJECTIVE:    PROM Shoulder:  Flexion: 80%  Abduction 80%  Ext. Rot 85%  Int. Rot WNL  Hor. Add WNL    AROM L shoulder:  Flexion 75%  Abduction 60%  Ext. Rot  In 90 deg abd 50%  Int. Rot WNL  Hor. Add WNL    L Int. Rotation Functional reach : reach to L glut        ASSESSMENT: Pt. L shoulder  AROM and PROM of flexion and abduction increased 25% from last visit.  Pt. Able to resume all exercises recommended per protocol at this time.  Pt. Tolerated therex and manual tx well.  Pt tolerated therex with dumbbell better than TB for flexion and abduction.  Cont. POC.  Pt. Expected to D/C 2-3 more weeks.         TREATMENT PROVIDED:  -Manual Therapy:  L shoulder PROM  X 5 min., L scapula mob x 2 min., L AC joint mob x 2 min.   -Therapeutic Exercise:  min.one on one 21 min.:  lower trap x 2 min., middle trap x 2min., shoulder ext x 2 min., int. Rot x 2 min., ext. Rot x 2 min.,  AROM/AAROM x 5 min, scap retraction x 2 min., shld flex x 2 min., shld abd x 2 min.    -Modalities: N/A  -Education on condition and posture, activity modifications    PLAN:  Patient will benefit from physical therapy (2-3) x/week for (4-6) weeks including manual therapy, therapeutic exercise, functional activities, modalities, and patient education.    Thank you for this referral.    These services are reasonable and necessary for the conditions set forth above while under my care.

## 2019-02-08 ENCOUNTER — CLINICAL SUPPORT (OUTPATIENT)
Dept: REHABILITATION | Facility: HOSPITAL | Age: 84
End: 2019-02-08
Attending: ORTHOPAEDIC SURGERY
Payer: MEDICARE

## 2019-02-08 DIAGNOSIS — Z96.612 STATUS POST TOTAL SHOULDER ARTHROPLASTY, LEFT: Primary | ICD-10-CM

## 2019-02-08 PROCEDURE — 97140 MANUAL THERAPY 1/> REGIONS: CPT | Mod: HCNC

## 2019-02-08 PROCEDURE — 97110 THERAPEUTIC EXERCISES: CPT | Mod: HCNC

## 2019-02-11 ENCOUNTER — CLINICAL SUPPORT (OUTPATIENT)
Dept: REHABILITATION | Facility: HOSPITAL | Age: 84
End: 2019-02-11
Attending: ORTHOPAEDIC SURGERY
Payer: MEDICARE

## 2019-02-11 DIAGNOSIS — Z96.612 STATUS POST TOTAL SHOULDER ARTHROPLASTY, LEFT: Primary | ICD-10-CM

## 2019-02-11 PROCEDURE — 97110 THERAPEUTIC EXERCISES: CPT | Mod: HCNC

## 2019-02-11 PROCEDURE — G8978 MOBILITY CURRENT STATUS: HCPCS | Mod: CJ,HCNC

## 2019-02-11 PROCEDURE — 97164 PT RE-EVAL EST PLAN CARE: CPT | Mod: HCNC,59

## 2019-02-11 PROCEDURE — G8979 MOBILITY GOAL STATUS: HCPCS | Mod: CI,HCNC

## 2019-02-11 NOTE — PLAN OF CARE
PHYSICAL THERAPY Re- EVALUATION    Referring Provider:  Dr. Solomon Lujan    Diagnosis:       ICD-10-CM ICD-9-CM    1. Status post total shoulder arthroplasty, left Z96.612 V43.61             Orders:  Evaluate and Treat    Date of Initial Evaluation: 10-24-18      Visit #     SUBJECTIVE:    Pt. Reports low BP today and taking eye steroid on empty stomach has caused her to feel weak also.        Current Pain:  Feels pretty good, does ache at night    OBJECTIVE:    Sensation: decreased sensation medial and lateral forearm and hand            Eval    Re-Eval   Re-Eval 1-11-9   Re-Eval 2-11-19         L    L    L     L  Shoulder A/PROM: Flexion     65 pain    80    110     150     Extension   WNL     WNL    WNL     WNL     Abduction   60 pain    90    105     112     Adduction   Lack 35 degrees   10    10     WNL     Internal Rotation  WNL neutral    WNL abd 90 deg.  WNL     WNL     External Rotation  0 degrees neutral  20 deg, 60 deg abd 90 25 deg. Neutral, 35 deg abd 90 50% with L shoulder abd           Eval     Re-Eval  Eval- L elbow flex: 115     141        Eval- L shoulder int. Rot functional reach L hip    Re-Eval L shoulder int. Rot functional reach L glut  Re-Eval- S1  Re-Eval 2-11-19: L sacrum      R UE AROM: R UE ROM 75%  Flexion and abduction, WNL ext. And int. Rot              R shoulder int. Rot functional reach  T 8               Eval  Re-Eval  Re-Eval 1-11-19 Re-Eval 2-11-19         L   L   L   L  Strength:  Supraspinatus   1+/5  1+/5   1+/5   1+/5     Subscapularis   1+/5  3+/5   3+/5   3+/5     Latissimus dorsi   NT  NT   NT     Rhomboids   NT  3+/5   3+/5   3/5     Upper Trapezius  NT  NT   B 5/5     Middle Trapezius  NT  3+/5   3+/5   3/5     Lower Trapezius  1+/5  1+/5   1+/5   3/5     Levator scapula  NT  NT   NT     Biceps    NT  4/5   4+/5   5/5     Triceps   NT  4/5   3/5   4/5     Flexors    1+/5  1+/5   1+/5   4/5      Abductors   1+/5  1+/5   1+/5   1+/5           STT (selective tension  test)- NT    Special test:  NT    GHJ mobility: pain at end range of all planes of movement  AC mobility: NT  ST mobility: NT      Tenderness to palpation:  NT      Quick DASH Shoulder Questionnaire           Score 1-5          Re-Eval            Re-Eval 1-11-19    Re-Eval 2-11-19  1. Opening a tight jar       4/5    4/5  3/5   2/5  2. Do heavy household chores     NA    4/5 4/5 4/5  3. Carry a shopping bag or briefcase     4/5    2/5  3/5   2/5  4. Wash your back      3/5    4/5  4/5   4/5  5. Use a knife to cut food     3/5    NA  3/5   2/5  6. Recreactional activities requiring force   5/5 4/5  7. Social Limitation      4/5 2/5 2/5 2/5  8. Work/ADL limitation      3/5    1/5  2/5   2/5  9. Arm, Shoulder or hand Pain    3/5    2/5  3/5   2/5  10. Tingling       2/5 1/5 1/5 1/5  11. Sleeping Limitation      2/5 1/5 1/5 2/5         Function: Patient reports 57.5% disability based on score of the Quick DASH. Eval    Patient reports 37.5% disability based on score of the Quick DASH. Re-Eval    Patient reports 40% disability based on score of the Quick DASH. Re-Eval    Patient reports 32.5% disability based on score of the Quick DASH. Re-Eval 2-11-19    Functional Limitations and Goal:   This patient's primary Physical Therapy goal is to improve his current level of function(carrying,moving and handling objects ) with minimal limitations. The patient's current level of impairment is 20-40% Impaired(CJ) based on their score of 40% impaired on the Quick DASH. The Patient is expected to achieve a score of 1-20%(CI) within 10 treatments.    ASSESSMENT:  Pt. Is progressing very well.  Pt. Is reaching overhead into flexion with ease.  Pt. Still limited moderately with L shoulder abduction and external rotation.  Pt.'s strength is improving but still has moderate weakness in L scapula muscles.  Pt. No longer has muscle tension in L biceps.  Pt. Expected to be ready for D/C in the  Next 3-4 weeks  with improved ROM and functional strength.      Co-morbidities which may impact the plan of care and potentially impede the patient's progress in therapy include:  Patients CLINICAL PRESENTATION is STABLE. Pt. Has multiple comorbidities.  Pt. Had 2 body systems examined.  Pt. Has low complexity eval.     Short Term Goals:  1-4 weeks  1. I with HEP Met  2.60% or greater and pain free  Shoulder AROM Progressing      Long Term Goals: 5-12 weeks  1. Return to activities without complains of pain and dysfunction Progressing  2. Increase L shoulder/ UE strength to 4+/5 to perform lifting and carrying activities. Progressing  3. Increase L shoulder AROM to 75% or greater to reach into overhead cabinet.  Progressing    TREATMENT PROVIDED:  -Manual Therapy:  L shoulder PROM  X 5 min.  -Therapeutic Exercise: min.one on one 12 min.: shoulder ext x 2 min., int. Rot x 2 min., ext. Rot x 2 min., scap retraction x 2 min., shld flex x 2 min., shld abd x 2 min.   -Modalities: N/A  -Re-Evaluation completed  -Education on condition and posture, activity modification    PLAN:  Patient will benefit from physical therapy (2-3) x/week for (4-6) weeks including manual therapy, therapeutic exercise, functional activities, modalities, and patient education.    Thank you for this referral.    These services are reasonable and necessary for the conditions set forth above while under my care.

## 2019-02-15 ENCOUNTER — CLINICAL SUPPORT (OUTPATIENT)
Dept: REHABILITATION | Facility: HOSPITAL | Age: 84
End: 2019-02-15
Attending: ORTHOPAEDIC SURGERY
Payer: MEDICARE

## 2019-02-15 DIAGNOSIS — Z96.612 STATUS POST TOTAL SHOULDER ARTHROPLASTY, LEFT: Primary | ICD-10-CM

## 2019-02-15 PROCEDURE — 97110 THERAPEUTIC EXERCISES: CPT | Mod: HCNC

## 2019-02-15 NOTE — PROGRESS NOTES
PHYSICAL THERAPY Daily Note    Referring Provider:  Dr. Solomon Lujan    Diagnosis:       ICD-10-CM ICD-9-CM    1. Status post total shoulder arthroplasty, left Z96.612 V43.61             Orders:  Evaluate and Treat    Date of Initial Evaluation: 10-24-18      Visit # 23    SUBJECTIVE:    Pt. Reports achiness in L shoulder just sitting or lying down.        Current Pain:  Not reported today    OBJECTIVE:      ASSESSMENT:  Pt. Cont. To improve with L shoulder PROM.  L shoulder active flexion improving.  Pt. Still moderately limited with L shoulder abduction and external rotation.  Pt. Has full L shoulder internal rotation and horizontal adduction.  Added strengthening to L shoulder horizontal adductors.  Pt. Had more tolerance for therex today.  Pt. Reported feeling that her ROM of L shoulder is back to where it was prior surgery.  Pt. Educated on completing rehab and educating PT on her goals.  Will begun functional diagonal pattern exercises and power and coordination activities next visit.      TREATMENT PROVIDED:  -Manual Therapy:  L shoulder PROM  X 5 min.  -Therapeutic Exercise: one on one 23 min.: L shoulder AROM x 5 min., shoulder ext x 2 min., int. Rot x 2 min., ext. Rot x 2 min., scap retraction x 2 min., shld flex x 2 min., shld abd x 2 min., Middle trap x 2 min., lower trap x 2 min., hor. Adduction x 2 min.    -Modalities: N/A  -Education on condition and posture, activity modification    PLAN:  Patient will benefit from physical therapy (2-3) x/week for (4-6) weeks including manual therapy, therapeutic exercise, functional activities, modalities, and patient education.    Thank you for this referral.    These services are reasonable and necessary for the conditions set forth above while under my care.

## 2019-02-18 ENCOUNTER — CLINICAL SUPPORT (OUTPATIENT)
Dept: REHABILITATION | Facility: HOSPITAL | Age: 84
End: 2019-02-18
Attending: ORTHOPAEDIC SURGERY
Payer: MEDICARE

## 2019-02-18 DIAGNOSIS — Z96.612 STATUS POST TOTAL SHOULDER ARTHROPLASTY, LEFT: Primary | ICD-10-CM

## 2019-02-18 PROCEDURE — 97110 THERAPEUTIC EXERCISES: CPT | Mod: HCNC

## 2019-02-18 NOTE — PROGRESS NOTES
PHYSICAL THERAPY Daily Note    Referring Provider:  Dr. Solomon Lujan    Diagnosis:       ICD-10-CM ICD-9-CM    1. Status post total shoulder arthroplasty, left Z96.612 V43.61             Orders:  Evaluate and Treat    Date of Initial Evaluation: 10-24-18      Visit # 24    SUBJECTIVE:    Pt. Reports achiness in shoulders.        Current Pain:  Not reported today    OBJECTIVE:      ASSESSMENT: Pt. Cont. To have 85% L shoulder flexion, abduction, and external rotation passively.  Pt. Cont. To have full PROM and active L shoulder horizontal adduction internal rotation.   Pt.'s L shoulder abduction improving actively to about 70% now.  Pt. Began functional strengthening of UE today  Which she tolerated well.  Will cont. 2-3  More visits to make sure pt. Independent with HEP.      TREATMENT PROVIDED:  -Manual Therapy:  L shoulder PROM  X 5 min.  -Therapeutic Exercise: one on one 23 min.: L shoulder AROM x 5 min., D1 flex x 2 min., D2 flex x 2 min., middle trap x 2 min., lower trap x 2 min., UBE x 3 min., Wall pushups x 2 min., ball on wall x 2 min., rebounder x 3 min.   -Modalities: N/A  -Education on condition and posture, activity modification    PLAN:  Patient will benefit from physical therapy (2-3) x/week for (4-6) weeks including manual therapy, therapeutic exercise, functional activities, modalities, and patient education.    Thank you for this referral.    These services are reasonable and necessary for the conditions set forth above while under my care.

## 2019-02-19 ENCOUNTER — TELEPHONE (OUTPATIENT)
Dept: OPHTHALMOLOGY | Facility: CLINIC | Age: 84
End: 2019-02-19

## 2019-02-19 NOTE — TELEPHONE ENCOUNTER
----- Message from Kerry Jeffries sent at 2/19/2019  8:26 AM CST -----  Contact: pt  Calling to speak with nurse of Dr. Ware about a RX. Please call patient at  [phone].

## 2019-02-19 NOTE — TELEPHONE ENCOUNTER
Spoke with pt.  She received a letter stating that her Rhopressa will cost over $200 now.  [facility] uses [facility] and would like to appeal.

## 2019-02-22 ENCOUNTER — CLINICAL SUPPORT (OUTPATIENT)
Dept: REHABILITATION | Facility: HOSPITAL | Age: 84
End: 2019-02-22
Attending: ORTHOPAEDIC SURGERY
Payer: MEDICARE

## 2019-02-22 DIAGNOSIS — Z96.612 STATUS POST TOTAL SHOULDER ARTHROPLASTY, LEFT: Primary | ICD-10-CM

## 2019-02-22 PROCEDURE — 97140 MANUAL THERAPY 1/> REGIONS: CPT | Mod: HCNC

## 2019-02-22 PROCEDURE — 97110 THERAPEUTIC EXERCISES: CPT | Mod: HCNC

## 2019-02-25 ENCOUNTER — CLINICAL SUPPORT (OUTPATIENT)
Dept: REHABILITATION | Facility: HOSPITAL | Age: 84
End: 2019-02-25
Attending: ORTHOPAEDIC SURGERY
Payer: MEDICARE

## 2019-02-25 DIAGNOSIS — Z96.612 STATUS POST TOTAL SHOULDER ARTHROPLASTY, LEFT: Primary | ICD-10-CM

## 2019-02-25 PROCEDURE — 97140 MANUAL THERAPY 1/> REGIONS: CPT | Mod: HCNC

## 2019-02-25 PROCEDURE — 97110 THERAPEUTIC EXERCISES: CPT | Mod: HCNC

## 2019-02-27 ENCOUNTER — PATIENT MESSAGE (OUTPATIENT)
Dept: INTERNAL MEDICINE | Facility: CLINIC | Age: 84
End: 2019-02-27

## 2019-02-27 DIAGNOSIS — R53.83 FATIGUE, UNSPECIFIED TYPE: ICD-10-CM

## 2019-02-27 DIAGNOSIS — R52 BODY ACHES: ICD-10-CM

## 2019-02-27 DIAGNOSIS — D53.9 MACROCYTIC ANEMIA: ICD-10-CM

## 2019-02-27 DIAGNOSIS — H40.1134 PRIMARY OPEN ANGLE GLAUCOMA OF BOTH EYES, INDETERMINATE STAGE: ICD-10-CM

## 2019-02-27 DIAGNOSIS — D64.9 ANEMIA, UNSPECIFIED TYPE: ICD-10-CM

## 2019-02-27 DIAGNOSIS — I10 ESSENTIAL HYPERTENSION: Primary | ICD-10-CM

## 2019-02-27 DIAGNOSIS — D75.89 MACROCYTOSIS: ICD-10-CM

## 2019-02-27 NOTE — TELEPHONE ENCOUNTER
She had B12 and folate checked back in 2015 and they were WNL. We will go ahead and recheck for anemia and recheck B12 and folate due to increased red cell size.    Orders placed - pls schedule

## 2019-02-27 NOTE — TELEPHONE ENCOUNTER
Patient sent over a My Chart message stating that she recently had a health assessment and was made aware that she do a test for anemia. Patient wants to know if she can do this for her up coming office visit 3/12/19. Patient last seen on 10/18/18.    Please Advise   6

## 2019-02-27 NOTE — TELEPHONE ENCOUNTER
----- Message from Kerry Jeffries sent at 2/27/2019 10:29 AM CST -----  Contact: [facility]  Need an ok before filling netarsudil (RHOPRESSA) 0.02 % Drop pls call pt is waiting at pharmacy [phone]

## 2019-03-04 ENCOUNTER — CLINICAL SUPPORT (OUTPATIENT)
Dept: INTERNAL MEDICINE | Facility: CLINIC | Age: 84
End: 2019-03-04
Payer: MEDICARE

## 2019-03-04 DIAGNOSIS — D75.89 MACROCYTOSIS: ICD-10-CM

## 2019-03-04 DIAGNOSIS — R53.83 FATIGUE, UNSPECIFIED TYPE: ICD-10-CM

## 2019-03-04 DIAGNOSIS — D53.9 MACROCYTIC ANEMIA: ICD-10-CM

## 2019-03-04 DIAGNOSIS — R52 BODY ACHES: ICD-10-CM

## 2019-03-04 DIAGNOSIS — D64.9 ANEMIA, UNSPECIFIED TYPE: ICD-10-CM

## 2019-03-04 DIAGNOSIS — I10 ESSENTIAL HYPERTENSION: ICD-10-CM

## 2019-03-04 LAB
ANION GAP SERPL CALC-SCNC: 9 MMOL/L
BASOPHILS # BLD AUTO: 0.05 K/UL
BASOPHILS NFR BLD: 0.8 %
BUN SERPL-MCNC: 39 MG/DL
CALCIUM SERPL-MCNC: 10.3 MG/DL
CHLORIDE SERPL-SCNC: 104 MMOL/L
CO2 SERPL-SCNC: 28 MMOL/L
CREAT SERPL-MCNC: 1.3 MG/DL
DIFFERENTIAL METHOD: ABNORMAL
EOSINOPHIL # BLD AUTO: 0.2 K/UL
EOSINOPHIL NFR BLD: 2.5 %
ERYTHROCYTE [DISTWIDTH] IN BLOOD BY AUTOMATED COUNT: 13 %
EST. GFR  (AFRICAN AMERICAN): 42.9 ML/MIN/1.73 M^2
EST. GFR  (NON AFRICAN AMERICAN): 37.2 ML/MIN/1.73 M^2
FOLATE SERPL-MCNC: 13.5 NG/ML
GLUCOSE SERPL-MCNC: 104 MG/DL
HCT VFR BLD AUTO: 38.9 %
HGB BLD-MCNC: 12.5 G/DL
IMM GRANULOCYTES # BLD AUTO: 0.01 K/UL
IMM GRANULOCYTES NFR BLD AUTO: 0.2 %
LYMPHOCYTES # BLD AUTO: 1.8 K/UL
LYMPHOCYTES NFR BLD: 27.7 %
MCH RBC QN AUTO: 32.2 PG
MCHC RBC AUTO-ENTMCNC: 32.1 G/DL
MCV RBC AUTO: 100 FL
MONOCYTES # BLD AUTO: 0.6 K/UL
MONOCYTES NFR BLD: 8.9 %
NEUTROPHILS # BLD AUTO: 3.8 K/UL
NEUTROPHILS NFR BLD: 59.9 %
NRBC BLD-RTO: 0 /100 WBC
PLATELET # BLD AUTO: 259 K/UL
PMV BLD AUTO: 10.5 FL
POTASSIUM SERPL-SCNC: 4.3 MMOL/L
RBC # BLD AUTO: 3.88 M/UL
SODIUM SERPL-SCNC: 141 MMOL/L
VIT B12 SERPL-MCNC: 532 PG/ML
WBC # BLD AUTO: 6.32 K/UL

## 2019-03-04 PROCEDURE — 82607 VITAMIN B-12: CPT | Mod: HCNC

## 2019-03-04 PROCEDURE — 82746 ASSAY OF FOLIC ACID SERUM: CPT | Mod: HCNC

## 2019-03-04 PROCEDURE — 85025 COMPLETE CBC W/AUTO DIFF WBC: CPT | Mod: HCNC

## 2019-03-04 PROCEDURE — 99999 PR PBB SHADOW E&M-EST. PATIENT-LVL I: ICD-10-PCS | Mod: PBBFAC,HCNC,,

## 2019-03-04 PROCEDURE — 80048 BASIC METABOLIC PNL TOTAL CA: CPT | Mod: HCNC

## 2019-03-04 PROCEDURE — 99999 PR PBB SHADOW E&M-EST. PATIENT-LVL I: CPT | Mod: PBBFAC,HCNC,,

## 2019-03-05 NOTE — PROGRESS NOTES
PHYSICAL THERAPY Daily Note/ Daily Note    Referring Provider:  Dr. Solomon Lujan    Diagnosis:       ICD-10-CM ICD-9-CM    1. Status post total shoulder arthroplasty, left Z96.612 V43.61             Orders:  Evaluate and Treat    Date of Initial Evaluation: 10-24-18      Visit # 27    SUBJECTIVE:  Pt. Reports having some aches at night in L shoulder.        Current Pain:  Not reported today      OBJECTIVE:                                             Function:       Quick DASH Shoulder Questionnaire           Score 1-5  1. Opening a tight jar       3/5  2. Do heavy household chores     3/5  3. Carry a shopping bag or briefcase     2/5  4. Wash your back      2/5  5. Use a knife to cut food     1/5  6. Recreactional activities requiring force   N/A  7. Social Limitation      1/5  8. Work/ADL limitation      1/5  9. Arm, Shoulder or hand Pain    2/5  10. Tingling       1/5  11. Sleeping Limitation      1/5           Patient reports 57.5% disability based on score of the Quick DASH. Eval                          Patient reports 37.5% disability based on score of the Quick DASH. Re-Eval                          Patient reports 40% disability based on score of the Quick DASH. Re-Eval                          Patient reports 32.5% disability based on score of the Quick DASH. Re-Eval 2-11-19      Patient reports 17.5% disability based on score of the Quick DASH. Re-Eval 2-11-19    Functional Limitations and Goal:   This patient's primary Physical Therapy goal is to improve his current level of function(carrying,moving and handling objects ) with minimal limitations. The patient's current level of impairment is 1-20% Impaired(CJ) based on their score of 17.5% impaired on the Quick DASH.     ASSESSMENT: Pt. Has full ROM passively of L shoulder.  Pt. Has 75%-85% L shoulder AROM.  Pt. Has good strength in L shoulder musculature now.  Pt. Has fair strength L scapula muscles now.  Pt. Has mild muscle tension L long  head of biceps tendon.  Pt. Educated on eccentric biceps strengthening to help decrease muscle tension.  Pt. Independent with HEP.  Pt. Agreement with D/C and continuing HEP.  Pt. Educated on continuing HEP to prevent decrease in ROM and strength.  Pt. Educated to ask for PT referral if any increase in pain or decrease in strength or ROM.        TREATMENT PROVIDED:    -Manual Therapy:  L shoulder PROM with stretch and end range  X 5 min    -Therapeutic Exercise: one on one: AROM x 5 min., L eccentric biceps x 3 min.     -Modalities: N/A    -Education on condition and posture, activity modification        PLAN:  Patient D/C with HEP.      Thank you for this referral.    These services are reasonable and necessary for the conditions set forth above while under my care.

## 2019-03-06 ENCOUNTER — CLINICAL SUPPORT (OUTPATIENT)
Dept: REHABILITATION | Facility: HOSPITAL | Age: 84
End: 2019-03-06
Attending: ORTHOPAEDIC SURGERY
Payer: MEDICARE

## 2019-03-06 DIAGNOSIS — Z96.612 STATUS POST TOTAL SHOULDER ARTHROPLASTY, LEFT: Primary | ICD-10-CM

## 2019-03-06 PROCEDURE — G8986 CARRY D/C STATUS: HCPCS | Mod: CI,HCNC

## 2019-03-06 PROCEDURE — G8984 CARRY CURRENT STATUS: HCPCS | Mod: CI,HCNC

## 2019-03-06 PROCEDURE — G8985 CARRY GOAL STATUS: HCPCS | Mod: CI,HCNC

## 2019-03-06 PROCEDURE — 97110 THERAPEUTIC EXERCISES: CPT | Mod: HCNC

## 2019-03-11 ENCOUNTER — OFFICE VISIT (OUTPATIENT)
Dept: ORTHOPEDICS | Facility: CLINIC | Age: 84
End: 2019-03-11
Payer: MEDICARE

## 2019-03-11 ENCOUNTER — HOSPITAL ENCOUNTER (OUTPATIENT)
Dept: RADIOLOGY | Facility: HOSPITAL | Age: 84
Discharge: HOME | End: 2019-03-11
Attending: ORTHOPAEDIC SURGERY
Payer: MEDICARE

## 2019-03-11 ENCOUNTER — OFFICE VISIT (OUTPATIENT)
Dept: DERMATOLOGY | Facility: CLINIC | Age: 84
End: 2019-03-11
Payer: MEDICARE

## 2019-03-11 VITALS — SYSTOLIC BLOOD PRESSURE: 139 MMHG | HEART RATE: 74 BPM | RESPIRATION RATE: 12 BRPM | DIASTOLIC BLOOD PRESSURE: 82 MMHG

## 2019-03-11 DIAGNOSIS — M19.012 PRIMARY OSTEOARTHRITIS OF LEFT SHOULDER: Primary | ICD-10-CM

## 2019-03-11 DIAGNOSIS — D18.00 ANGIOMA: ICD-10-CM

## 2019-03-11 DIAGNOSIS — Z85.828 HISTORY OF SKIN CANCER: ICD-10-CM

## 2019-03-11 DIAGNOSIS — Z12.83 SCREENING, MALIGNANT NEOPLASM, SKIN: ICD-10-CM

## 2019-03-11 DIAGNOSIS — L82.1 SEBORRHEIC KERATOSIS: ICD-10-CM

## 2019-03-11 DIAGNOSIS — L90.5 SCAR CONDITIONS/SKIN FIBROSIS: Primary | ICD-10-CM

## 2019-03-11 DIAGNOSIS — Z96.612 STATUS POST REVERSE ARTHROPLASTY OF LEFT SHOULDER: ICD-10-CM

## 2019-03-11 PROCEDURE — 73030 X-RAY EXAM OF SHOULDER: CPT | Mod: 26,HCNC,LT, | Performed by: RADIOLOGY

## 2019-03-11 PROCEDURE — 99213 PR OFFICE/OUTPT VISIT, EST, LEVL III, 20-29 MIN: ICD-10-PCS | Mod: HCNC,S$GLB,, | Performed by: ORTHOPAEDIC SURGERY

## 2019-03-11 PROCEDURE — 99999 PR PBB SHADOW E&M-EST. PATIENT-LVL II: CPT | Mod: PBBFAC,HCNC,, | Performed by: DERMATOLOGY

## 2019-03-11 PROCEDURE — 1101F PT FALLS ASSESS-DOCD LE1/YR: CPT | Mod: HCNC,CPTII,S$GLB, | Performed by: DERMATOLOGY

## 2019-03-11 PROCEDURE — 1101F PR PT FALLS ASSESS DOC 0-1 FALLS W/OUT INJ PAST YR: ICD-10-PCS | Mod: HCNC,CPTII,S$GLB, | Performed by: ORTHOPAEDIC SURGERY

## 2019-03-11 PROCEDURE — 99213 PR OFFICE/OUTPT VISIT, EST, LEVL III, 20-29 MIN: ICD-10-PCS | Mod: HCNC,S$GLB,, | Performed by: DERMATOLOGY

## 2019-03-11 PROCEDURE — 73030 XR SHOULDER COMPLETE 2 OR MORE VIEWS LEFT: ICD-10-PCS | Mod: 26,HCNC,LT, | Performed by: RADIOLOGY

## 2019-03-11 PROCEDURE — 99213 OFFICE O/P EST LOW 20 MIN: CPT | Mod: HCNC,S$GLB,, | Performed by: DERMATOLOGY

## 2019-03-11 PROCEDURE — 99999 PR PBB SHADOW E&M-EST. PATIENT-LVL IV: ICD-10-PCS | Mod: PBBFAC,HCNC,, | Performed by: ORTHOPAEDIC SURGERY

## 2019-03-11 PROCEDURE — 1101F PR PT FALLS ASSESS DOC 0-1 FALLS W/OUT INJ PAST YR: ICD-10-PCS | Mod: HCNC,CPTII,S$GLB, | Performed by: DERMATOLOGY

## 2019-03-11 PROCEDURE — 99213 OFFICE O/P EST LOW 20 MIN: CPT | Mod: HCNC,S$GLB,, | Performed by: ORTHOPAEDIC SURGERY

## 2019-03-11 PROCEDURE — 99999 PR PBB SHADOW E&M-EST. PATIENT-LVL II: ICD-10-PCS | Mod: PBBFAC,HCNC,, | Performed by: DERMATOLOGY

## 2019-03-11 PROCEDURE — 99999 PR PBB SHADOW E&M-EST. PATIENT-LVL IV: CPT | Mod: PBBFAC,HCNC,, | Performed by: ORTHOPAEDIC SURGERY

## 2019-03-11 PROCEDURE — 73030 X-RAY EXAM OF SHOULDER: CPT | Mod: TC,HCNC,LT

## 2019-03-11 PROCEDURE — 1101F PT FALLS ASSESS-DOCD LE1/YR: CPT | Mod: HCNC,CPTII,S$GLB, | Performed by: ORTHOPAEDIC SURGERY

## 2019-03-11 NOTE — PATIENT INSTRUCTIONS
Over-the-counter Retinol Products    Neutrogena Rapid Wrinkle Repair with retinol     - OR -    [employer] [place] Regenerist Intensive Repair with retinol    Apply a small amount to the entire face at bedtime    Use a facial sunscreen with SPF of at least 30 daily        Preventing Skin Cancer  Relaxing in the sun may feel good. But it isnt good for your skin. In fact, being exposed to the suns harmful rays is a major cause of skin cancer. This is a serious disease that can be life-threatening. People of all ages and backgrounds are at risk. But in most cases, skin cancer can be prevented.    Your Role in Prevention  You can act today to help prevent skin cancer. Start by avoiding the suns UV (ultraviolet) rays. And dont use tanning beds, which are no safer than the sun. Taking these steps can help keep you from getting skin cancer. It can also help prevent wrinkles and other sun-induced aging effects. Make sure your children also follow these safeguards. Now is the time to start taking preventive steps against skin cancer.  When You Are Outdoors  Protect your skin when you go outdoors during the day. Take precautions whenever you go out to eat, run errands by car or on foot, or do any outdoor activity. There isnt just one easy way to protect your skin. Its best to follow all of these steps:  · Wear tightly woven clothing that covers your skin. Put on a wide-brimmed hat to protect your face, ears, and scalp.  · Watch the clock. Try to avoid the sun between 10 a.m. and 4 p.m., when it is strongest.  · Head for the shade or create your own. Use an umbrella when sitting or strolling.  · Know that the suns rays can reflect off sand, water, and snow. This can harm your skin. Take extra care when you are near reflective surfaces.  · Keep in mind that even when the weather is hazy or cloudy, your skin can be exposed to strong UV rays.  · Shield your skin with sunscreen. Also, apply sunscreen to your childrens skin.  Tips  for Using Sunscreen  To help prevent skin cancer, choose the right sunscreen and use it correctly. Try the following tips:  · Choose a sunscreen that has a sun protection factor (SPF) of at least 15. For the best protection, an SPF of at least 30 is preferred. Also, choose a sunscreen labeled broad spectrum. This will shield you from both UVA and UVB (ultraviolet A and B) rays.  · If one brand irritates your skin, try another, particularly ones without fragrance.  · Use a water-resistant sunscreen if swimming or sweating.  · Reapply sunscreen every 2 hours. If youre active, do this more often.  · Cover any sun-exposed skin, from your face to your feet. Dont forget your ears and your lips.  · Know that while sunscreen helps protect you, it isnt enough. You should also wear protective clothing. And try to stay out of the sun as much as you can, especially from 10 a.m. to 4 p.m.  © 0520-7888 [employer]. [address] [place] [address], [place] [address]. All rights reserved. This information is not intended as a substitute for professional medical care. Always follow your healthcare professional's instructions.

## 2019-03-11 NOTE — PROGRESS NOTES
Subjective:       Patient ID:  Shirley Metz is a 86 y.o. female who presents for   Chief Complaint   Patient presents with    Follow-up     check spots on back     Hx of SCC left posterior shoulder and BCC of the left mid-back (s/p ED&C on 5/21/18, s/p re-biopsy of ED&C site on 1/22/19 showing keloid scar no evidence of recurrence), last seen on 7/17/18.  This is a high risk patient here to check for the development of new lesions. Denies bleeding, tender, growing, or concerning lesions.           Review of Systems   Constitutional: Negative for fever and chills.   Gastrointestinal: Negative for nausea and vomiting.   Skin: Negative for daily sunscreen use, activity-related sunscreen use and recent sunburn.   Hematologic/Lymphatic: Does not bruise/bleed easily.        Objective:    Physical Exam   Constitutional: She appears well-developed and well-nourished. No distress.   Neurological: She is alert and oriented to person, place, and time. She is not disoriented.   Psychiatric: She has a normal mood and affect.   Skin:   Areas Examined (abnormalities noted in diagram):   Scalp / Hair Palpated and Inspected  Head / Face Inspection Performed  Neck Inspection Performed  Chest / Axilla Inspection Performed  Abdomen Inspection Performed  Back Inspection Performed  RUE Inspected  LUE Inspection Performed  RLE Inspected  LLE Inspection Performed  Nails and Digits Inspection Performed                   Diagram Legend     Erythematous scaling macule/papule c/w actinic keratosis       Vascular papule c/w angioma      Pigmented verrucoid papule/plaque c/w seborrheic keratosis      Yellow umbilicated papule c/w sebaceous hyperplasia      Irregularly shaped tan macule c/w lentigo     1-2 mm smooth white papules consistent with Milia      Movable subcutaneous cyst with punctum c/w epidermal inclusion cyst      Subcutaneous movable cyst c/w pilar cyst      Firm pink to brown papule c/w dermatofibroma       Pedunculated fleshy papule(s) c/w skin tag(s)      Evenly pigmented macule c/w junctional nevus     Mildly variegated pigmented, slightly irregular-bordered macule c/w mildly atypical nevus      Flesh colored to evenly pigmented papule c/w intradermal nevus       Pink pearly papule/plaque c/w basal cell carcinoma      Erythematous hyperkeratotic cursted plaque c/w SCC      Surgical scar with no sign of skin cancer recurrence      Open and closed comedones      Inflammatory papules and pustules      Verrucoid papule consistent consistent with wart     Erythematous eczematous patches and plaques     Dystrophic onycholytic nail with subungual debris c/w onychomycosis     Umbilicated papule    Erythematous-base heme-crusted tan verrucoid plaque consistent with inflamed seborrheic keratosis     Erythematous Silvery Scaling Plaque c/w Psoriasis     See annotation      Assessment / Plan:        Scar conditions/skin fibrosis  Screening, malignant neoplasm, skin  History of skin cancer  Scar of the left posterior shoulder, left mid-back, hx of NMSC.  No evidence of recurrence on physical exam today.  Continue routine skin surveillance. Daily sunscreen advised.    Seborrheic keratosis  Reassurance given. Discussed diagnosis and that lesions are benign.  AAD handout given.     Angioma  Reassurance given.  Lesions are benign.             Follow-up in about 6 months (around 9/11/2019).

## 2019-03-11 NOTE — PROGRESS NOTES
Subjective:     Patient ID: Shirley Metz is a 86 y.o. female.    Chief Complaint: Post-op Evaluation and Pain of the Left Shoulder    She is post op 6-7  months left reverse total shoulder arthroplasty. Pain is continuing to improve.     Continue to improve with therapy, improving strength, range of motion and pain is continuing to improve.        Shoulder Pain    The pain is present in the left shoulder. This is a chronic problem. The current episode started more than 1 month ago. The problem occurs rarely. The problem has been gradually improving. The quality of the pain is described as aching. The pain is at a severity of 0/10. Pertinent negatives include no fever or itching. The symptoms are aggravated by activity. She has tried OTC pain meds, OTC ointments, heat and brace/corset for the symptoms. The treatment provided moderate relief. Physical therapy was effective.      Past Medical History:   Diagnosis Date    Anemia 11/29/2018    Anxiety 11/28/2017    Arthritis     Back pain     Basal cell carcinoma 03/29/2018    left mid back    Colon polyps     reported per patient.     Fuchs' corneal dystrophy     General anesthetics causing adverse effect in therapeutic use     woke up during surgery and gagged on ET tube    Glaucoma     Glaucoma     Heart failure with preserved left ventricular function (HFpEF) 7/24/2018    Hyperlipidemia     Hypertension     Macular degeneration dry    Obesity     Squamous cell carcinoma 01/08/2019    left shoulder    Trouble in sleeping     Urinary tract infection      Past Surgical History:   Procedure Laterality Date    ADENOIDECTOMY  1938    ARTHROPLASTY, SHOULDER, TOTAL, REVERSE Left 8/21/2018    [facility]    BREAST BIOPSY Left     1997. 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 Quintanilla    EYE SURGERY      Cataract wIOL    left thumb Left 2011    removed cuboid for arthritis    SKIN CANCER EXCISION Left 2017    BCC removal by Dr. Valadez    SLT- OS (aka TRABECULOPLASTY) Left 05/11/2011    repeat 3/14/12    TENOTOMY Left 8/21/2018    [facility] OR    TONSILLECTOMY  1938     Family History   Problem Relation Age of Onset    Heart disease Mother     Hypertension Mother     Cancer Father 88        sarcoma( ?Kaposi's ) on leg    Deep vein thrombosis Neg Hx     Ovarian cancer Neg Hx     Breast cancer Neg Hx     Kidney disease Neg Hx     Strabismus Neg Hx     Retinal detachment Neg Hx     Macular degeneration Neg Hx     Glaucoma Neg Hx     Blindness Neg Hx     Amblyopia Neg Hx     Eczema Neg Hx     Lupus Neg Hx     Melanoma Neg Hx     Psoriasis Neg Hx     Diabetes Neg Hx     Stroke Neg Hx     Mental retardation Neg Hx     Mental illness Neg Hx     Hyperlipidemia Neg Hx     COPD Neg Hx     Asthma Neg Hx     Depression Neg Hx     Alcohol abuse Neg Hx     Drug abuse Neg Hx      Social History     Socioeconomic History    Marital status:      Spouse name: Not on file    Number of children: Not on file    Years of education: Not on file    Highest education level: Not on file   Social Needs    Financial resource strain: Not on file    Food insecurity - worry: Not on file    Food insecurity - inability: Not on file    Transportation needs - medical: Not on file    Transportation needs - non-medical: Not on file   Occupational History    Not on file   Tobacco Use    Smoking status: Never Smoker    Smokeless tobacco: Never Used    Tobacco comment: history of passive smoking from her ex-   Substance and Sexual  Activity    Alcohol use: Yes     Alcohol/week: 1.2 oz     Types: 2 Standard drinks or equivalent per week     Comment: occasional     Drug use: No    Sexual activity: Not Currently     Partners: Male     Birth control/protection: Post-menopausal     Comment: discussed protection for STD's   Other Topics Concern    Are you pregnant or think you may be? No    Breast-feeding No   Social History Narrative     x 2,  x 1. Retired artist - previously doing jewelry/wall pieces; ; lives in [facility]; has 3 sons - 52( lives in [place], 54, and 56;exercises minimally - limited due to back issues. Still drives. Does have a Living Will.     Medication List with Changes/Refills   Current Medications    ACETAMINOPHEN (TYLENOL) 500 MG TABLET    Take 500 mg by mouth once daily at 6am. Taking 1 to 3    ALPHA LIPOIC ACID ORAL    Take 200 mg by mouth.     ALPRAZOLAM (XANAX) 0.5 MG TABLET    TAKE 1/2 - 1 TABLET BY MOUTH NIGHTLY FOR SLEEP OR ANXIETY    B COMPLEX-VITAMIN C-FOLIC ACID 0.8 MG TAB    Take 1 tablet by mouth once daily.    BOSWELLIA LASHAWN EXTRACT (BOSWELLIA LASHAWN XT, BULK, MISC)    by Misc.(Non-Drug; Combo Route) route.    CALCIUM CITRATE ORAL    Take by mouth.    CHOLECALCIFEROL, VITAMIN D3, (VITAMIN D3) 2,000 UNIT CAP    Take 1 capsule by mouth once daily.    CLONIDINE (CATAPRES) 0.1 MG TABLET    Take 1 tablet (0.1 mg total) by mouth 2 (two) times daily.    COENZYME Q10 200 MG CAPSULE    Take 400 mg by mouth once daily.     FISH OIL-OMEGA-3 FATTY ACIDS 300-1,000 MG CAPSULE    Take 2 g by mouth once daily.    LOSARTAN (COZAAR) 50 MG TABLET    Take 1 tablet (50 mg total) by mouth 2 (two) times daily.    LOTEMAX 0.5 % DRPG        METRONIDAZOLE (METROGEL) 0.75 % GEL    Apply topically 2 (two) times daily.    NETARSUDIL (RHOPRESSA) 0.02 % DROP    Place 1 drop into the left eye every evening.    NETARSUDIL (RHOPRESSA) 0.02 % DROP    Place 1 drop into the left eye every evening.     POLYETHYLENE GLYCOL 3350 (MIRALAX ORAL)    Take by mouth as needed.     PYRIDOXINE, VITAMIN B6, (B-6) 100 MG TAB    Take 100 mg by mouth 2 (two) times daily.     TRAVOPROST (TRAVATAN Z) 0.004 % OPHTHALMIC SOLUTION    Place 1 drop into the left eye every evening.    TRIAMTERENE-HYDROCHLOROTHIAZIDE 37.5-25 MG (DYAZIDE) 37.5-25 MG PER CAPSULE    Take 1 capsule by mouth once daily.     Review of patient's allergies indicates:   Allergen Reactions    Claritin [loratadine] Other (See Comments)     Double vision/spots    Hydrocodone-acetaminophen      wheezing    Naproxen      wheezing    Verapamil Diarrhea    Vibramycin [doxycycline calcium] Other (See Comments)     Weakness nausea   sob    Amlodipine      Myalgias      Coreg [carvedilol] Swelling     lips    Fenofibrate      Causes muscle weakness    Hydralazine analogues      Muscle tremors/aches      Isosorbide     Lasix [furosemide]      myalgias    Moxifloxacin Other (See Comments)     Hives   muscle soreness    Timolol     Adhesive Rash     Clonidine patch - 2 mfg - contact dermatitis    Allegra [fexofenadine] Other (See Comments)     Double vision/spots     Codeine Diarrhea and Other (See Comments)     Loss of balance     Erythromycin Other (See Comments)     Complete weakness/could not walk    Hydrocortisone (bulk) Other (See Comments)     Only suppository/ caused muscle weakness    Macrolide antibiotics Other (See Comments)     Complete weakness/could not walk    Patanol [olopatadine] Other (See Comments)     Muscle weakness    Prednisone Anxiety    Statins-hmg-coa reductase inhibitors Other (See Comments)     Muscle weakness    Xalatan [latanoprost] Other (See Comments)     Muscle weakness     Review of Systems   Constitution: Negative for fever.   HENT: Negative for sore throat.    Eyes: Negative for blurred vision.   Cardiovascular: Negative for dyspnea on exertion.   Respiratory: Negative for shortness of breath.     Hematologic/Lymphatic: Does not bruise/bleed easily.   Skin: Negative for itching.   Gastrointestinal: Negative for vomiting.   Genitourinary: Negative for dysuria.   Neurological: Negative for dizziness.   Psychiatric/Behavioral: The patient does not have insomnia.        Objective:   There is no height or weight on file to calculate BMI.  Vitals:    03/11/19 0926   BP: 139/82   Pulse: 74   Resp: 12           General    Nursing note and vitals reviewed.  Constitutional: She is oriented to person, place, and time. She appears well-developed. No distress.   HENT:   Head: Normocephalic and atraumatic.   Eyes: EOM are normal.   Cardiovascular: Normal rate.    Pulmonary/Chest: Effort normal. No stridor.   Neurological: She is alert and oriented to person, place, and time.   Psychiatric: She has a normal mood and affect. Her behavior is normal.         Right Shoulder Exam     Comments:  For flexion actively 155    Left Shoulder Exam     Tests & Signs   Rotator cuff painful arc/range: No significant.    Other   Sensation: normal     Comments:  Left shoulder incision clean dry intact healing well no signs infection, sensation light touch axillary nerve distribution, active range of motion with forward flexion to 140, external rotation at the side is to 20, internal rotation is to L5 no crepitus    5/5 strength external rotation and internal rotation, also good strength with abduction and supraspinatus testing      Vascular Exam       Capillary Refill  Left Hand: normal capillary refill      Radiographs / Imaging : Results reviewed by me and interpreted by me, discussed with the patient and / or family   Left reverse total shoulder prosthesis in good position no signs of loosening or failure, no signs of scapular notching, no interval detrimental changes seen      Assessment:     Encounter Diagnoses   Name Primary?    Primary osteoarthritis of left shoulder Yes    Status post reverse arthroplasty of left shoulder          Plan:     Overall Ifrah is doing excellent, congratulated on her her on her rehabilitation    Can progress to activities as tolerated left shoulder, recommend avoid heavy lifting in general    Continue home exercise program    Follow-up in 6 months for new x-rays of the left shoulder for surveillance -  she relates that she is very happy with her outcome and definitely notices decreased pain now compared to prior to surgery and increased function and range of motion to the left shoulder.

## 2019-03-12 ENCOUNTER — OFFICE VISIT (OUTPATIENT)
Dept: INTERNAL MEDICINE | Facility: CLINIC | Age: 84
End: 2019-03-12
Payer: MEDICARE

## 2019-03-12 VITALS
SYSTOLIC BLOOD PRESSURE: 127 MMHG | WEIGHT: 172.06 LBS | DIASTOLIC BLOOD PRESSURE: 64 MMHG | BODY MASS INDEX: 29.54 KG/M2 | HEART RATE: 71 BPM | TEMPERATURE: 98 F | OXYGEN SATURATION: 99 %

## 2019-03-12 DIAGNOSIS — I50.30 HEART FAILURE WITH PRESERVED LEFT VENTRICULAR FUNCTION (HFPEF): ICD-10-CM

## 2019-03-12 DIAGNOSIS — T88.7XXA MEDICATION SIDE EFFECTS: ICD-10-CM

## 2019-03-12 DIAGNOSIS — I70.0 CALCIFICATION OF AORTA: ICD-10-CM

## 2019-03-12 DIAGNOSIS — I10 ESSENTIAL HYPERTENSION: Primary | ICD-10-CM

## 2019-03-12 DIAGNOSIS — I51.89 DIASTOLIC DYSFUNCTION WITHOUT HEART FAILURE: ICD-10-CM

## 2019-03-12 DIAGNOSIS — N18.30 CKD (CHRONIC KIDNEY DISEASE) STAGE 3, GFR 30-59 ML/MIN: ICD-10-CM

## 2019-03-12 DIAGNOSIS — D75.89 MACROCYTOSIS: ICD-10-CM

## 2019-03-12 PROCEDURE — 1101F PT FALLS ASSESS-DOCD LE1/YR: CPT | Mod: HCNC,CPTII,S$GLB, | Performed by: FAMILY MEDICINE

## 2019-03-12 PROCEDURE — 1101F PR PT FALLS ASSESS DOC 0-1 FALLS W/OUT INJ PAST YR: ICD-10-PCS | Mod: HCNC,CPTII,S$GLB, | Performed by: FAMILY MEDICINE

## 2019-03-12 PROCEDURE — 99999 PR PBB SHADOW E&M-EST. PATIENT-LVL III: CPT | Mod: PBBFAC,HCNC,, | Performed by: FAMILY MEDICINE

## 2019-03-12 PROCEDURE — 99999 PR PBB SHADOW E&M-EST. PATIENT-LVL III: ICD-10-PCS | Mod: PBBFAC,HCNC,, | Performed by: FAMILY MEDICINE

## 2019-03-12 PROCEDURE — 99499 RISK ADDL DX/OHS AUDIT: ICD-10-PCS | Mod: S$GLB,,, | Performed by: FAMILY MEDICINE

## 2019-03-12 PROCEDURE — 99214 OFFICE O/P EST MOD 30 MIN: CPT | Mod: HCNC,S$GLB,, | Performed by: FAMILY MEDICINE

## 2019-03-12 PROCEDURE — 99214 PR OFFICE/OUTPT VISIT, EST, LEVL IV, 30-39 MIN: ICD-10-PCS | Mod: HCNC,S$GLB,, | Performed by: FAMILY MEDICINE

## 2019-03-12 PROCEDURE — 99499 UNLISTED E&M SERVICE: CPT | Mod: S$GLB,,, | Performed by: FAMILY MEDICINE

## 2019-03-12 RX ORDER — HYDROCHLOROTHIAZIDE 12.5 MG/1
12.5 CAPSULE ORAL DAILY
Qty: 90 CAPSULE | Refills: 1 | Status: SHIPPED | OUTPATIENT
Start: 2019-03-12 | End: 2019-04-17

## 2019-03-12 RX ORDER — VITAMIN E 268 MG
400 CAPSULE ORAL DAILY
Status: ON HOLD | COMMUNITY
End: 2019-10-11 | Stop reason: HOSPADM

## 2019-03-12 NOTE — PROGRESS NOTES
Subjective:       Patient ID: Shirley Metz is a 86 y.o. female.    Chief Complaint: 6mth check up    Patient with difficult to control hypertension, hyperlipidemia, multiple med sensitivities/intolerance, anxiety here for scheduled recheck with recent labs. Lab Results       Component                Value               Date                       WBC                      6.32                03/04/2019                 HGB                      12.5                03/04/2019                 HCT                      38.9                03/04/2019                 PLT                      259                 03/04/2019                 CHOL                     180                 03/16/2018                 TRIG                     97                  03/16/2018                 HDL                      43                  03/16/2018                 LDLCALC                  117.6               03/16/2018                 ALT                      14                  07/24/2018                 AST                      18                  07/24/2018                 NA                       141                 03/04/2019                 K                        4.3                 03/04/2019                 CL                       104                 03/04/2019                 CALCIUM                  10.3                03/04/2019                 CREATININE               1.3                 03/04/2019                 BUN                      39 (H)              03/04/2019                 CO2                      28                  03/04/2019                 TSH                      1.871               01/04/2017                 INR                      1.0                 01/25/2018                 GLU                      104                 03/04/2019                 ESTGFRAFRICA             42.9 (A)            03/04/2019                 EGFRNONAA                37.2 (A)            03/04/2019                  HGBA1C                   5.7 (H)             03/16/2018   Last visit notes 10/18/18: No smart phone - so no digital HTN program. Recheck 6 months - same meds. BP much improved. Ok to stay off metoprolol for now but may want to get back on low dose metoprolol for cardiac protection in future. Using clonidine in tiny dose qHS and it may help her sleep - will not D/C.    She is continued on the 1/2 clonidine nightly, losartan 50 b.i.d., and 12.5 hydrochlorothiazide daily.  Her blood pressure is controlled at intake.    She is concerned about a patch of skin to left axilla. She thinks there is a bulge there, different from the right.    She continues with macrocytosis - B12 and folate checked once again and WNL. Anemia is resolved.      Review of Systems   Constitutional: Negative for activity change and fever.   Psychiatric/Behavioral: Positive for sleep disturbance (improved with meds).       Objective:      Physical Exam   Constitutional: She is oriented to person, place, and time. She appears well-developed.   HENT:   Head: Normocephalic and atraumatic.   Cardiovascular: Normal rate, regular rhythm and normal heart sounds.   Pulmonary/Chest: Effort normal and breath sounds normal.   Neurological: She is alert and oriented to person, place, and time.   Skin: Skin is warm and dry.   Sl asymmetry to fat collection axillae with pulled in puckered skin L - no subcu or deep lesion palpable. No lymph nodes either side.   Psychiatric: She has a normal mood and affect. Her behavior is normal.         Assessment/Plan:     1. Essential hypertension  hydroCHLOROthiazide (MICROZIDE) 12.5 mg capsule   2. CKD (chronic kidney disease) stage 3, GFR 30-59 ml/min     3. Calcification of aorta     4. Macrocytosis     5. Diastolic dysfunction without heart failure     6. Heart failure with preserved left ventricular function (HFpEF)     7. Medication side effects

## 2019-03-13 ENCOUNTER — TELEPHONE (OUTPATIENT)
Dept: OPHTHALMOLOGY | Facility: CLINIC | Age: 84
End: 2019-03-13

## 2019-03-13 DIAGNOSIS — H40.1134 PRIMARY OPEN ANGLE GLAUCOMA OF BOTH EYES, INDETERMINATE STAGE: ICD-10-CM

## 2019-03-13 NOTE — TELEPHONE ENCOUNTER
Mrs Sg Metz sent a letter that her Rhopressa was $199.00 for one bottle based on how the script is being wrote as taken to one eye. She can get 1 bottle if dosed to OU for $99.00  I also spoke to the Pharmacist today at [facility] that verified  This information and I gave her a verbal to change to [facility] over the phone   Dr Ware therefore sent a new rx to [facility] over   I then notified the patient that this process was completed as per her letter   Her co pay is more also due to her deductible

## 2019-03-21 ENCOUNTER — OFFICE VISIT (OUTPATIENT)
Dept: OPHTHALMOLOGY | Facility: CLINIC | Age: 84
End: 2019-03-21
Payer: MEDICARE

## 2019-03-21 DIAGNOSIS — Z96.1 PSEUDOPHAKIA: ICD-10-CM

## 2019-03-21 DIAGNOSIS — Z94.7 HISTORY OF CORNEA TRANSPLANT: ICD-10-CM

## 2019-03-21 DIAGNOSIS — H40.1134 PRIMARY OPEN ANGLE GLAUCOMA OF BOTH EYES, INDETERMINATE STAGE: Primary | ICD-10-CM

## 2019-03-21 PROCEDURE — 99999 PR PBB SHADOW E&M-EST. PATIENT-LVL II: CPT | Mod: PBBFAC,HCNC,, | Performed by: OPHTHALMOLOGY

## 2019-03-21 PROCEDURE — 92083 EXTENDED VISUAL FIELD XM: CPT | Mod: HCNC,S$GLB,, | Performed by: OPHTHALMOLOGY

## 2019-03-21 PROCEDURE — 92083 HUMPHREY VISUAL FIELD - OU - BOTH EYES: ICD-10-PCS | Mod: HCNC,S$GLB,, | Performed by: OPHTHALMOLOGY

## 2019-03-21 PROCEDURE — 92012 PR EYE EXAM, EST PATIENT,INTERMED: ICD-10-PCS | Mod: HCNC,S$GLB,, | Performed by: OPHTHALMOLOGY

## 2019-03-21 PROCEDURE — 99499 RISK ADDL DX/OHS AUDIT: ICD-10-PCS | Mod: S$GLB,,, | Performed by: OPHTHALMOLOGY

## 2019-03-21 PROCEDURE — 92012 INTRM OPH EXAM EST PATIENT: CPT | Mod: HCNC,S$GLB,, | Performed by: OPHTHALMOLOGY

## 2019-03-21 PROCEDURE — 99999 PR PBB SHADOW E&M-EST. PATIENT-LVL II: ICD-10-PCS | Mod: PBBFAC,HCNC,, | Performed by: OPHTHALMOLOGY

## 2019-03-21 PROCEDURE — 99499 UNLISTED E&M SERVICE: CPT | Mod: S$GLB,,, | Performed by: OPHTHALMOLOGY

## 2019-03-21 NOTE — PROGRESS NOTES
SUBJECTIVE:   Shirley Metz is a 86 y.o. female   Corrected distance visual acuity was 20/25 -2 in the right eye and 20/30 -1 in the left eye.   Chief Complaint   Patient presents with    Glaucoma     3M HVF, SDP, and DOA        HPI:  HPI     Glaucoma      Additional comments: 3M HVF, SDP, and DOA              Comments     The patient states her eyes are doing okay but they are very dry still.  100% drop compliance    Lives at [facility] sx on 8/21/18    1. Mild COAG Goal < 19  SLT OD 3/04 (20 to 15) + 3/11 (19 to 15)  SLT OS 5/05 (20 to 14) +5/11 (18-19 to 15) + 3/12 (min resp.) + 3/11/15   (20.5-17.5) + 3/7/18 (24- 21)  (Cosopt, Xalatan, and Timolol cause Myalgias)  (Alphagan causes redness) (zioptan too expensive)  2. PCIOL OU  3. Dry AMD  4. Fuch's Dystrophy   DSAEK OD 4/16 Dr. Quintanilla (followed by Dwight every summer)  DSAEK OS 8/15 Dr. Quintanilla  5. Dry Eye -intolerance to Restasis       Lotemax Mon, Wed, Fri and Sun once daily OU  Systane Ultra 4-5 tiimes daily  Travatan Z qhs os  Rhopressa qhs OS          Last edited by Nupur Amaya on 3/21/2019  1:20 PM. (History)        Assessment /Plan :  1. Primary open angle glaucoma of both eyes, indeterminate stage   Doing well, IOP OD within acceptable range relative to target IOP and no evidence of progression. Continue current treatment. Reviewed importance of continued compliance with treatment and follow up.    IOP OS not within acceptable range relative to target IOP with risk of irreversible visual loss. Better IOP control is recommended. Discussed options, risks, and benefits of additional medication, SLT laser, and/or incisional glaucoma surgery. Reviewed importance of continued compliance with treatment and follow up.     Patient chooses defer and recheck IOP next visit     2. Pseudophakia stable   3. History of cornea transplant followed by Dr. Quintanilla       Return to clinic in 6 weeks  or as needed.  With Dilation and  SDP

## 2019-04-02 ENCOUNTER — TELEPHONE (OUTPATIENT)
Dept: ORTHOPEDICS | Facility: CLINIC | Age: 84
End: 2019-04-02

## 2019-04-17 ENCOUNTER — OFFICE VISIT (OUTPATIENT)
Dept: INTERNAL MEDICINE | Facility: CLINIC | Age: 84
End: 2019-04-17
Payer: MEDICARE

## 2019-04-17 VITALS
WEIGHT: 172.94 LBS | TEMPERATURE: 96 F | OXYGEN SATURATION: 99 % | HEART RATE: 71 BPM | SYSTOLIC BLOOD PRESSURE: 141 MMHG | HEIGHT: 64 IN | BODY MASS INDEX: 29.52 KG/M2 | DIASTOLIC BLOOD PRESSURE: 64 MMHG

## 2019-04-17 DIAGNOSIS — I10 ESSENTIAL HYPERTENSION: Primary | ICD-10-CM

## 2019-04-17 DIAGNOSIS — R20.2 PARESTHESIA OF BOTH FEET: ICD-10-CM

## 2019-04-17 DIAGNOSIS — N18.30 CKD (CHRONIC KIDNEY DISEASE) STAGE 3, GFR 30-59 ML/MIN: ICD-10-CM

## 2019-04-17 DIAGNOSIS — R73.03 PRE-DIABETES: ICD-10-CM

## 2019-04-17 PROCEDURE — 1101F PR PT FALLS ASSESS DOC 0-1 FALLS W/OUT INJ PAST YR: ICD-10-PCS | Mod: HCNC,CPTII,S$GLB, | Performed by: FAMILY MEDICINE

## 2019-04-17 PROCEDURE — 99999 PR PBB SHADOW E&M-EST. PATIENT-LVL III: ICD-10-PCS | Mod: PBBFAC,HCNC,, | Performed by: FAMILY MEDICINE

## 2019-04-17 PROCEDURE — 99214 PR OFFICE/OUTPT VISIT, EST, LEVL IV, 30-39 MIN: ICD-10-PCS | Mod: HCNC,S$GLB,, | Performed by: FAMILY MEDICINE

## 2019-04-17 PROCEDURE — 1101F PT FALLS ASSESS-DOCD LE1/YR: CPT | Mod: HCNC,CPTII,S$GLB, | Performed by: FAMILY MEDICINE

## 2019-04-17 PROCEDURE — 99999 PR PBB SHADOW E&M-EST. PATIENT-LVL III: CPT | Mod: PBBFAC,HCNC,, | Performed by: FAMILY MEDICINE

## 2019-04-17 PROCEDURE — 99214 OFFICE O/P EST MOD 30 MIN: CPT | Mod: HCNC,S$GLB,, | Performed by: FAMILY MEDICINE

## 2019-04-17 RX ORDER — TRIAMTERENE AND HYDROCHLOROTHIAZIDE 37.5; 25 MG/1; MG/1
1 CAPSULE ORAL EVERY MORNING
COMMUNITY
End: 2019-06-27

## 2019-04-17 RX ORDER — LOSARTAN POTASSIUM AND HYDROCHLOROTHIAZIDE 12.5; 5 MG/1; MG/1
2 TABLET ORAL
COMMUNITY
Start: 2016-05-09 | End: 2019-04-17

## 2019-04-17 NOTE — PATIENT INSTRUCTIONS
Take both BP meds prior to nurse visit in 4 weeks.  You may start now taking both at the same time.

## 2019-04-17 NOTE — PROGRESS NOTES
Subjective:       Patient ID: Shirley Metz is a 86 y.o. female.    Chief Complaint: 6 wk follow up r/t HTN    Patient with difficult to control hypertension, hyperlipidemia, multiple med sensitivities/intolerance, anxiety here for recheck. Last seen 5 weeks ago and BP controlled at that time on multiple meds.  Intake today 142/73. Repeat also not quite controlled - 141/64.    Lab Results       Component                Value               Date                       WBC                      6.32                03/04/2019                 HGB                      12.5                03/04/2019                 HCT                      38.9                03/04/2019                 PLT                      259                 03/04/2019                 CHOL                     180                 03/16/2018                 TRIG                     97                  03/16/2018                 HDL                      43                  03/16/2018                 LDLCALC                  117.6               03/16/2018                 ALT                      14                  07/24/2018                 AST                      18                  07/24/2018                 NA                       141                 03/04/2019                 K                        4.3                 03/04/2019                 CL                       104                 03/04/2019                 CALCIUM                  10.3                03/04/2019                 CREATININE               1.3                 03/04/2019                 BUN                      39 (H)              03/04/2019                 CO2                      28                  03/04/2019                 TSH                      1.871               01/04/2017                 INR                      1.0                 01/25/2018                 GLU                      104                 03/04/2019                 ESTGFRAFRICA              "42.9 (A)            03/04/2019                 EGFRNONAA                37.2 (A)            03/04/2019                 HGBA1C                   5.7 (H)             03/16/2018              She states the HCTZ 12.5 did not control her BP - she had more swelling, SOB, -160s. She tried it for little over a week. Then she went back on the Dyazide. She states these symptoms resolved. She does not want to be on a diuretic. She sts she wants to be on a "natural diuretic" - sts she has researched dandelion and wants to use a product that has multiple herbs that she has in mind.    She brings in BPS - running 120s -130s/50s-70s.  Reviewed prior mammo, BMD, Colonoscopy reports with pt.    She is c/o redness and heat to both feet at end of day - notices it after she gets home. Happening daily. Feet get hot. NO pain, burning, numbness.    Review of Systems   Constitutional: Negative for fever.   Respiratory: Negative for shortness of breath.    Cardiovascular: Negative for chest pain, palpitations and leg swelling.   Musculoskeletal: Positive for myalgias (sore R calf for 3-4 weeks).   Skin: Positive for color change (redness to toes and lateral feet - it comes and goes - assoc with feet feeling hot.).   Neurological: Positive for light-headedness. Negative for numbness.       Objective:      Physical Exam   Constitutional: She is oriented to person, place, and time. She appears well-developed.   HENT:   Head: Normocephalic and atraumatic.   Cardiovascular: Normal rate, regular rhythm and normal heart sounds.   Pulmonary/Chest: Effort normal and breath sounds normal.   Neurological: She is alert and oriented to person, place, and time.   Skin: Skin is warm and dry.   Psychiatric: She has a normal mood and affect. Her behavior is normal.         Assessment/Plan:     1. Essential hypertension  Comprehensive metabolic panel    Lipid panel   2. CKD (chronic kidney disease) stage 3, GFR 30-59 ml/min     3. Pre-diabetes  " Hemoglobin A1c   4. Paresthesia of both feet     foot symptoms do not appear c/w peripheral neuropathy. Will monitor.  Will recheck in 6 months if BP controlled at nurse visit. Get lipids/BMD at that time - due in august earliest and she wishes to get it later.  She is off the metoprolol - would add this if still not controlled.  More than 50% of visit was spent in counseling regarding options, recommendations, medication use, side effects, expectations, and precautions.  Total time spent face-to-face was 25 minutes.

## 2019-05-06 ENCOUNTER — OFFICE VISIT (OUTPATIENT)
Dept: OPHTHALMOLOGY | Facility: CLINIC | Age: 84
End: 2019-05-06
Payer: MEDICARE

## 2019-05-06 DIAGNOSIS — H40.1134 PRIMARY OPEN ANGLE GLAUCOMA OF BOTH EYES, INDETERMINATE STAGE: Primary | ICD-10-CM

## 2019-05-06 DIAGNOSIS — Z96.1 PSEUDOPHAKIA: ICD-10-CM

## 2019-05-06 DIAGNOSIS — R60.0 FACIAL EDEMA: ICD-10-CM

## 2019-05-06 DIAGNOSIS — Z94.7 HISTORY OF CORNEA TRANSPLANT: ICD-10-CM

## 2019-05-06 PROCEDURE — 92014 COMPRE OPH EXAM EST PT 1/>: CPT | Mod: S$GLB,,, | Performed by: OPHTHALMOLOGY

## 2019-05-06 PROCEDURE — 99999 PR PBB SHADOW E&M-EST. PATIENT-LVL II: ICD-10-PCS | Mod: PBBFAC,HCNC,, | Performed by: OPHTHALMOLOGY

## 2019-05-06 PROCEDURE — 92250 FUNDUS PHOTOGRAPHY W/I&R: CPT | Mod: S$GLB,,, | Performed by: OPHTHALMOLOGY

## 2019-05-06 PROCEDURE — 92014 PR EYE EXAM, EST PATIENT,COMPREHESV: ICD-10-PCS | Mod: S$GLB,,, | Performed by: OPHTHALMOLOGY

## 2019-05-06 PROCEDURE — 92250 COLOR FUNDUS PHOTOGRAPHY - OU - BOTH EYES: ICD-10-PCS | Mod: S$GLB,,, | Performed by: OPHTHALMOLOGY

## 2019-05-06 PROCEDURE — 99999 PR PBB SHADOW E&M-EST. PATIENT-LVL II: CPT | Mod: PBBFAC,HCNC,, | Performed by: OPHTHALMOLOGY

## 2019-05-06 NOTE — PROGRESS NOTES
SUBJECTIVE:   Shirley Metz is a 86 y.o. female   Corrected distance visual acuity was 20/25 -2 in the right eye and 20/30 +1 in the left eye.   Chief Complaint   Patient presents with    Glaucoma     IOP check then FD, L'max MWF daily OU, Rhopressa OS QHS, Travatan Z QHS OS        HPI:  HPI     Glaucoma      Additional comments: IOP check then FD, L'max MWF daily OU, Rhopressa OS   QHS, Travatan Z QHS OS              Comments     Pt states she's using her drops as directed. No ocular pain or   irritation. Vision is about the same.    [facility]    1. Mild COAG Goal < 19  SLT OD 3/04 (20 to 15) + 3/11 (19 to 15)  SLT OS 5/05 (20 to 14) +5/11 (18-19 to 15) + 3/12 (min resp.) + 3/11/15   (20.5-17.5) + 3/7/18 (24- 21)  (Cosopt, Xalatan, and Timolol cause Myalgias)  (Alphagan causes redness) (zioptan too expensive)  2. PCIOL OU  3. Dry AMD  4. Fuch's Dystrophy   DSAEK OD 4/16 Dr. Quintanilla (followed by [facility] every summer)  DSAEK OS 8/15 Dr. Quintanilla  5. Dry Eye -intolerance to Restasis       Lotemax Mon, Wed, Fri and Sun once daily OU  Systane Ultra 4-5 tiimes daily  Travatan Z qhs os  Rhopressa qhs OS          Last edited by Wenceslao Sher on 5/6/2019 10:17 AM. (History)        Assessment /Plan :  1. Primary open angle glaucoma of both eyes, indeterminate stage   Doing well, IOP OD within acceptable range relative to target IOP and no evidence of progression. Continue current treatment. Reviewed importance of continued compliance with treatment and follow up.    IOP OS not within acceptable range relative to target IOP with risk of irreversible visual loss. Better IOP control is recommended. Discussed options, risks, and benefits of additional medication, SLT laser, and/or incisional glaucoma surgery. Reviewed importance of continued compliance with treatment and follow up.     Patient chooses defer and recheck IOP next visit       2. Pseudophakia stable   3.  History of cornea transplant    4.      Facial edema recommend ice packs and Benadryl po as directed, could consult Dr. Henson in the future    Return to clinic in 3 months  or as needed.  With IOP Check and GOCT

## 2019-06-27 ENCOUNTER — TELEPHONE (OUTPATIENT)
Dept: CARDIOLOGY | Facility: CLINIC | Age: 84
End: 2019-06-27

## 2019-06-27 ENCOUNTER — OFFICE VISIT (OUTPATIENT)
Dept: CARDIOLOGY | Facility: CLINIC | Age: 84
End: 2019-06-27
Payer: MEDICARE

## 2019-06-27 ENCOUNTER — CLINICAL SUPPORT (OUTPATIENT)
Dept: CARDIOLOGY | Facility: CLINIC | Age: 84
End: 2019-06-27
Payer: MEDICARE

## 2019-06-27 VITALS
DIASTOLIC BLOOD PRESSURE: 70 MMHG | WEIGHT: 169.31 LBS | SYSTOLIC BLOOD PRESSURE: 168 MMHG | HEIGHT: 64 IN | BODY MASS INDEX: 28.91 KG/M2 | HEART RATE: 71 BPM

## 2019-06-27 DIAGNOSIS — I10 ESSENTIAL HYPERTENSION: ICD-10-CM

## 2019-06-27 DIAGNOSIS — E78.49 OTHER HYPERLIPIDEMIA: Chronic | ICD-10-CM

## 2019-06-27 DIAGNOSIS — I10 ESSENTIAL HYPERTENSION: Primary | ICD-10-CM

## 2019-06-27 DIAGNOSIS — I50.30 HEART FAILURE WITH PRESERVED LEFT VENTRICULAR FUNCTION (HFPEF): ICD-10-CM

## 2019-06-27 DIAGNOSIS — I10 ESSENTIAL HYPERTENSION: Primary | Chronic | ICD-10-CM

## 2019-06-27 DIAGNOSIS — I49.3 PVC'S (PREMATURE VENTRICULAR CONTRACTIONS): ICD-10-CM

## 2019-06-27 DIAGNOSIS — I11.9 HYPERTENSIVE LEFT VENTRICULAR HYPERTROPHY, WITHOUT HEART FAILURE: Chronic | ICD-10-CM

## 2019-06-27 DIAGNOSIS — N18.30 CHRONIC KIDNEY DISEASE, STAGE 3: Chronic | ICD-10-CM

## 2019-06-27 PROBLEM — R03.0 ELEVATED BLOOD PRESSURE READING: Status: RESOLVED | Noted: 2017-05-03 | Resolved: 2019-06-27

## 2019-06-27 PROBLEM — I51.89 DIASTOLIC DYSFUNCTION WITHOUT HEART FAILURE: Chronic | Status: RESOLVED | Noted: 2017-11-28 | Resolved: 2019-06-27

## 2019-06-27 PROBLEM — R79.89 ELEVATED TROPONIN I LEVEL: Status: RESOLVED | Noted: 2017-05-13 | Resolved: 2019-06-27

## 2019-06-27 PROCEDURE — 99214 OFFICE O/P EST MOD 30 MIN: CPT | Mod: HCNC,S$GLB,, | Performed by: INTERNAL MEDICINE

## 2019-06-27 PROCEDURE — 99214 PR OFFICE/OUTPT VISIT, EST, LEVL IV, 30-39 MIN: ICD-10-PCS | Mod: HCNC,S$GLB,, | Performed by: INTERNAL MEDICINE

## 2019-06-27 PROCEDURE — 99499 RISK ADDL DX/OHS AUDIT: ICD-10-PCS | Mod: HCNC,S$GLB,, | Performed by: INTERNAL MEDICINE

## 2019-06-27 PROCEDURE — 99499 UNLISTED E&M SERVICE: CPT | Mod: HCNC,S$GLB,, | Performed by: INTERNAL MEDICINE

## 2019-06-27 PROCEDURE — 1101F PT FALLS ASSESS-DOCD LE1/YR: CPT | Mod: HCNC,CPTII,S$GLB, | Performed by: INTERNAL MEDICINE

## 2019-06-27 PROCEDURE — 93000 ELECTROCARDIOGRAM COMPLETE: CPT | Mod: HCNC,S$GLB,, | Performed by: INTERNAL MEDICINE

## 2019-06-27 PROCEDURE — 99999 PR PBB SHADOW E&M-EST. PATIENT-LVL III: CPT | Mod: PBBFAC,HCNC,, | Performed by: INTERNAL MEDICINE

## 2019-06-27 PROCEDURE — 93000 EKG 12-LEAD: ICD-10-PCS | Mod: HCNC,S$GLB,, | Performed by: INTERNAL MEDICINE

## 2019-06-27 PROCEDURE — 1101F PR PT FALLS ASSESS DOC 0-1 FALLS W/OUT INJ PAST YR: ICD-10-PCS | Mod: HCNC,CPTII,S$GLB, | Performed by: INTERNAL MEDICINE

## 2019-06-27 PROCEDURE — 99999 PR PBB SHADOW E&M-EST. PATIENT-LVL III: ICD-10-PCS | Mod: PBBFAC,HCNC,, | Performed by: INTERNAL MEDICINE

## 2019-06-27 RX ORDER — HYDROCHLOROTHIAZIDE 12.5 MG/1
12.5 TABLET ORAL DAILY
Qty: 30 TABLET | Refills: 11 | Status: SHIPPED | OUTPATIENT
Start: 2019-06-27 | End: 2019-08-01

## 2019-06-27 RX ORDER — TRIAMTERENE CAPSULES 50 MG/1
50 CAPSULE ORAL DAILY
Qty: 30 CAPSULE | Refills: 11 | Status: SHIPPED | OUTPATIENT
Start: 2019-06-27 | End: 2019-08-07 | Stop reason: SDUPTHER

## 2019-06-27 NOTE — TELEPHONE ENCOUNTER
----- Message from Sharmaine Bearden sent at 6/27/2019  2:36 PM CDT -----  Contact: [facility]  Type:  Pharmacy Calling to Clarify an RX    Name of Caller:Bud  Pharmacy Name:[facility]   Prescription Name:dyrenium isn't available, she can't find the brand, the generic (triamterene), its a couple hundred bucks   What do they need to clarify?:she would like discuss this with us  Best Call Back Number:[phone]  Additional Information: .    Thank you

## 2019-06-27 NOTE — PROGRESS NOTES
Subjective:   Patient ID:  Shirley Metz is a 86 y.o. female who presents for cardiac consult of Hypertension (6 mo f/u) and Hyperlipidemia      Hypertension       The patient came in today for cardiac consult of Hypertension (6 mo f/u) and Hyperlipidemia    Shirley Metz is a 86 y.o. female pt with HTN, HFpEF, hyperlipidemia, palpitations, CKD presents today for follow-up CV eval.    5/1/18  She has significant side effects from many BP meds, could not tolerate Verapamil recently. BP has improved, but sometimes BP gets too low - occasionally 119/53.   She thinks her BP is too low under 130 systolic and wants to decrease some meds. Discussed we can half the clonidine patch and monitor. Will continue other meds for now.She has a good log with BPs daily. Overall BP is well controlled now. Tries to eat low salt diet for the most part but is at Nursing home and can't always control the diet. Other main issue is her arm pain due to shoulder pain will see pain management specialist Dr. Chin.     5/24/18  Has a lot of pain in both arms, will be seeing Dr. Chin and then may need surgery by Dr. Lujan. Reviewed BP log - mostly 130s-140s, once 96/58. Occasional palpitations - usually with waking up or sleeping.     6/11/18  Pt has been getting painful rash at site of clonidine. Also has an infection possibly at left shoulder where procedure happened for shoulders. She is also seeing surgery today for shoulder pain. Pt also feels very weak due to clonidine and has trouble getting up with 0.2 mg patch. Last night BP went up to 190/71 and took a clonidine .1 mg PO dose which improved BP. Bp mildly elevated today but also in a lot of pain.     7/24/18  Pt is scheduled for shoulder surgery on shoulder Aug 21st. She has been getting accupuncture which has helped to walk. She has stopped clonidine patch is taking pills BID/TID. BP overall have been well controlled, 140-150s/50-60s. Otherwise feels ok.      11/13/18  She is s/p L shoulder surgery currently undergoing PT. Pt had reverse joint repair/surgery due to a cyst in the joint/bone. Has another month of rehab at least. BP overall has been in 130s/60s. No further dizziness, has stopped metoprolol.   ECG - NSR    6/27/19  She had some allergy to chemicals at [employer] while something was sprayed. She had sneezing, runny nose. She was taking echinacea and other herbal meds which helped. She then took Benadryl and accupuncture. BP has been up for a month. She was off metoprolol, doubled clonidine and still elevated. Discussed wants lower HCTZ but BP has been normal at home as well.   ECG - NSR    Patient feels no chest pain, no sob, no leg swelling, no PND,  no dizziness, no syncope, no CNS symptoms.    Patient is compliant with medications.    2D ECHO 3/2018  CONCLUSIONS     1 - Concentric hypertrophy.     2 - No wall motion abnormalities.     3 - Normal left ventricular systolic function (EF 55-60%).     4 - Normal left ventricular diastolic function.     5 - Normal right ventricular systolic function .     6 - Mild tricuspid regurgitation.       Past Medical History:   Diagnosis Date    Anemia 11/29/2018    Anxiety 11/28/2017    Arthritis     Back pain     Basal cell carcinoma 03/29/2018    left mid back    Colon polyps     reported per patient.     Fuchs' corneal dystrophy     General anesthetics causing adverse effect in therapeutic use     woke up during surgery and gagged on ET tube    Glaucoma     Glaucoma     Heart failure with preserved left ventricular function (HFpEF) 7/24/2018    Hyperlipidemia     Hypertension     Macular degeneration dry    Obesity     Squamous cell carcinoma 01/08/2019    left shoulder    Trouble in sleeping     Urinary tract infection        Past Surgical History:   Procedure Laterality Date    ADENOIDECTOMY  1938    ARTHROPLASTY, SHOULDER, TOTAL, REVERSE Left 8/21/2018    Per[facility] by [facility]  at [facility]    BREAST BIOPSY Left     1997. benign    BREAST CYST EXCISION Left 1997 approx    CARPAL TUNNEL RELEASE Right 2012 approx    CATARACT EXTRACTION Bilateral 1994    CHOLECYSTECTOMY  1975    COLONOSCOPY N/A 11/26/2013    Performed by Familia Santo MD at [facility] ENDO    CORNEAL TRANSPLANT Bilateral 08/2015 8/2015 - left; Right 4/2016    EYE SURGERY      Fuch's Corneal dystrophy - had 2nd operation with Dr. Quintanilla    EYE SURGERY      Cataract wIOL    left thumb Left 2011    removed cuboid for arthritis    SKIN CANCER EXCISION Left 2017    BCC removal by Dr. Valadez    SLT- OS (aka TRABECULOPLASTY) Left 05/11/2011    repeat 3/14/12    TENOTOMY Left 8/21/2018    Performed by [facility] at [facility] OR    TONSILLECTOMY  1938       Social History     Tobacco Use    Smoking status: Never Smoker    Smokeless tobacco: Never Used    Tobacco comment: history of passive smoking from her ex-   Substance Use Topics    Alcohol use: Yes     Alcohol/week: 1.2 oz     Types: 2 Standard drinks or equivalent per week     Comment: occasional     Drug use: No       Family History   Problem Relation Age of Onset    Heart disease Mother     Hypertension Mother     Cancer Father 88        sarcoma( ?Kaposi's ) on leg    Deep vein thrombosis Neg Hx     Ovarian cancer Neg Hx     Breast cancer Neg Hx     Kidney disease Neg Hx     Strabismus Neg Hx     Retinal detachment Neg Hx     Macular degeneration Neg Hx     Glaucoma Neg Hx     Blindness Neg Hx     Amblyopia Neg Hx     Eczema Neg Hx     Lupus Neg Hx     Melanoma Neg Hx     Psoriasis Neg Hx     Diabetes Neg Hx     Stroke Neg Hx     Mental retardation Neg Hx     Mental illness Neg Hx     Hyperlipidemia Neg Hx     COPD Neg Hx     Asthma Neg Hx     Depression Neg Hx     Alcohol abuse Neg Hx     Drug abuse Neg Hx        Patient's Medications   New Prescriptions    No medications on file   Previous Medications    ACETAMINOPHEN  (TYLENOL) 500 MG TABLET    Take 500 mg by mouth daily as needed. Taking 1 to 3    ALPHA LIPOIC ACID ORAL    Take 200 mg by mouth.     ALPRAZOLAM (XANAX) 0.5 MG TABLET    TAKE 1/2 - 1 TABLET BY MOUTH NIGHTLY FOR SLEEP OR ANXIETY    B COMPLEX-VITAMIN C-FOLIC ACID 0.8 MG TAB    Take 1 tablet by mouth once daily.    BOSWELLIA LASHAWN EXTRACT (BOSWELLIA LASHAWN XT, BULK, MISC)    by Misc.(Non-Drug; Combo Route) route.    CALCIUM CITRATE ORAL    Take by mouth.    CHOLECALCIFEROL, VITAMIN D3, (VITAMIN D3) 2,000 UNIT CAP    Take 1 capsule by mouth once daily.    CHROMIUM ORAL    Take 200 mg by mouth once daily.    CLONIDINE (CATAPRES) 0.1 MG TABLET    Take 1 tablet (0.1 mg total) by mouth 2 (two) times daily.    COENZYME Q10 200 MG CAPSULE    Take 400 mg by mouth once daily.     DIPHENHYDRAMINE-ACETAMINOPHEN (TYLENOL PM)  MG TAB    Take 1 tablet by mouth nightly as needed.     FISH OIL-OMEGA-3 FATTY ACIDS 300-1,000 MG CAPSULE    Take 2 g by mouth 2 (two) times daily.     LOSARTAN (COZAAR) 50 MG TABLET    Take 1 tablet (50 mg total) by mouth 2 (two) times daily.    LOTEMAX 0.5 % DRPG        NETARSUDIL (RHOPRESSA) 0.02 % DROP    Place 1 drop into the left eye every evening.    POLYETHYLENE GLYCOL 3350 (MIRALAX ORAL)    Take by mouth as needed.     PYRIDOXINE, VITAMIN B6, (B-6) 100 MG TAB    Take 100 mg by mouth once daily.     TRAVOPROST (TRAVATAN Z) 0.004 % OPHTHALMIC SOLUTION    Place 1 drop into the left eye every evening.    TRIAMTERENE-HYDROCHLOROTHIAZIDE 37.5-25 MG (DYAZIDE) 37.5-25 MG PER CAPSULE    Take 1 capsule by mouth every morning.    TURMERIC ORAL    Take 500 mg by mouth 2 (two) times daily.    VITAMIN E 400 UNIT CAPSULE    Take 400 Units by mouth once daily.    Modified Medications    No medications on file   Discontinued Medications    NETARSUDIL (RHOPRESSA) 0.02 % DROP    Place 1 drop into both eyes every evening.       Review of Systems   Constitutional: Negative.    HENT: Negative.    Eyes: Negative.   "  Respiratory: Negative.    Cardiovascular: Negative for leg swelling.   Gastrointestinal: Negative.    Genitourinary: Negative.    Musculoskeletal: Positive for joint pain.   Skin: Negative for rash.   Neurological: Negative for dizziness.   Endo/Heme/Allergies: Negative.    Psychiatric/Behavioral: Negative.        Wt Readings from Last 3 Encounters:   06/27/19 76.8 kg (169 lb 5 oz)   04/17/19 78.4 kg (172 lb 15.2 oz)   03/12/19 78 kg (172 lb 1.1 oz)     Temp Readings from Last 3 Encounters:   04/17/19 96.1 °F (35.6 °C) (Oral)   03/12/19 97.5 °F (36.4 °C) (Tympanic)   11/29/18 98 °F (36.7 °C) (Tympanic)     BP Readings from Last 3 Encounters:   06/27/19 (!) 168/70   04/17/19 (!) 141/64   03/12/19 127/64     Pulse Readings from Last 3 Encounters:   06/27/19 71   04/17/19 71   03/12/19 71       BP (!) 168/70 (BP Method: Large (Manual)) Comment: checked with pt's home monitor right arm: 174/67  Pulse 71   Ht 5' 4" (1.626 m)   Wt 76.8 kg (169 lb 5 oz)   BMI 29.06 kg/m²     Objective:   Physical Exam   Constitutional: She is oriented to person, place, and time. She appears well-developed and well-nourished. No distress.   HENT:   Head: Normocephalic and atraumatic.   Nose: Nose normal.   Mouth/Throat: Oropharynx is clear and moist.   Eyes: Conjunctivae and EOM are normal. No scleral icterus.   Neck: Normal range of motion. Neck supple. No JVD present. No thyromegaly present.   Cardiovascular: Normal rate, regular rhythm, S1 normal and S2 normal. Exam reveals no gallop, no S3, no S4 and no friction rub.   No murmur heard.  Pulmonary/Chest: Effort normal and breath sounds normal. No stridor. No respiratory distress. She has no wheezes. She has no rales. She exhibits no tenderness.   Abdominal: Soft. Bowel sounds are normal. She exhibits no distension and no mass. There is no tenderness. There is no rebound.   Genitourinary:   Genitourinary Comments: Deferred   Musculoskeletal: Normal range of motion. She exhibits no " edema, tenderness or deformity.   Lymphadenopathy:     She has no cervical adenopathy.   Neurological: She is alert and oriented to person, place, and time. She exhibits normal muscle tone. Coordination normal.   Skin: Skin is warm and dry. Rash (on anterior chest - 2x2 square of clonide) noted. She is not diaphoretic. There is erythema. No pallor.   Psychiatric: She has a normal mood and affect. Her behavior is normal. Judgment and thought content normal.   Nursing note and vitals reviewed.      Lab Results   Component Value Date     03/04/2019    K 4.3 03/04/2019     03/04/2019    CO2 28 03/04/2019    BUN 39 (H) 03/04/2019    CREATININE 1.3 03/04/2019     03/04/2019    HGBA1C 5.7 (H) 03/16/2018    MG 1.9 03/16/2018    AST 18 07/24/2018    ALT 14 07/24/2018    ALBUMIN 3.8 07/24/2018    PROT 7.8 07/24/2018    BILITOT 0.5 07/24/2018    WBC 6.32 03/04/2019    HGB 12.5 03/04/2019    HCT 38.9 03/04/2019     (H) 03/04/2019     03/04/2019    INR 1.0 01/25/2018    TSH 1.871 01/04/2017    CHOL 180 03/16/2018    HDL 43 03/16/2018    LDLCALC 117.6 03/16/2018    TRIG 97 03/16/2018     (H) 03/15/2018     Assessment:      1. Essential hypertension    2. Other hyperlipidemia    3. Hypertensive left ventricular hypertrophy, without heart failure    4. Heart failure with preserved left ventricular function (HFpEF)    5. Chronic kidney disease, stage 3    6. PVC's (premature ventricular contractions)        Plan:   1. HTN - elevated today  - increase Triamterine, decrease HCTZ due to freq uination   - pt has numerous side effects to almost all other BP meds - norvasc, coreg, verapamil, BB  - cont low salt diet    2. HLD  - cont low manuel diet    3. HFpEF  - pt euvolemic  - cont low salt diet    4. Palpitations  - no further episdoes  - off BB     5. CKD3  - cont to monitor    Thank you for allowing me to participate in this patient's care. Please do not hesitate to contact me with any  questions or concerns. Consult note has been forwarded to the referral physician.

## 2019-06-27 NOTE — TELEPHONE ENCOUNTER
Called to patient and informed of Dr. Ramirez's recommendations concerning issue with insurance covering hydrodiuril---tell her to revert to her current meds she's on and check BP, will have to adjust meds but it will be a challenge due to multiple allergies-patient verbalizes understanding of all information

## 2019-07-09 ENCOUNTER — PATIENT MESSAGE (OUTPATIENT)
Dept: INTERNAL MEDICINE | Facility: CLINIC | Age: 84
End: 2019-07-09

## 2019-07-09 DIAGNOSIS — F41.9 ANXIETY: ICD-10-CM

## 2019-07-09 DIAGNOSIS — G47.00 INSOMNIA, UNSPECIFIED TYPE: ICD-10-CM

## 2019-07-09 RX ORDER — ALPRAZOLAM 0.5 MG/1
TABLET ORAL
Qty: 30 TABLET | Refills: 2 | Status: SHIPPED | OUTPATIENT
Start: 2019-07-09 | End: 2020-01-08 | Stop reason: SDUPTHER

## 2019-08-01 ENCOUNTER — OFFICE VISIT (OUTPATIENT)
Dept: INTERNAL MEDICINE | Facility: CLINIC | Age: 84
End: 2019-08-01
Payer: MEDICARE

## 2019-08-01 VITALS
HEIGHT: 64 IN | WEIGHT: 170.5 LBS | BODY MASS INDEX: 29.11 KG/M2 | SYSTOLIC BLOOD PRESSURE: 138 MMHG | DIASTOLIC BLOOD PRESSURE: 64 MMHG | TEMPERATURE: 99 F | HEART RATE: 71 BPM | OXYGEN SATURATION: 97 %

## 2019-08-01 DIAGNOSIS — R10.84 GENERALIZED ABDOMINAL PAIN: Primary | ICD-10-CM

## 2019-08-01 DIAGNOSIS — I10 ESSENTIAL HYPERTENSION: Chronic | ICD-10-CM

## 2019-08-01 DIAGNOSIS — R73.03 PRE-DIABETES: ICD-10-CM

## 2019-08-01 LAB
ALBUMIN SERPL BCP-MCNC: 3.6 G/DL (ref 3.5–5.2)
ALP SERPL-CCNC: 72 U/L (ref 55–135)
ALT SERPL W/O P-5'-P-CCNC: 14 U/L (ref 10–44)
ANION GAP SERPL CALC-SCNC: 12 MMOL/L (ref 8–16)
AST SERPL-CCNC: 21 U/L (ref 10–40)
BILIRUB SERPL-MCNC: 0.4 MG/DL (ref 0.1–1)
BUN SERPL-MCNC: 30 MG/DL (ref 8–23)
CALCIUM SERPL-MCNC: 10 MG/DL (ref 8.7–10.5)
CHLORIDE SERPL-SCNC: 104 MMOL/L (ref 95–110)
CHOLEST SERPL-MCNC: 233 MG/DL (ref 120–199)
CHOLEST/HDLC SERPL: 4.8 {RATIO} (ref 2–5)
CO2 SERPL-SCNC: 24 MMOL/L (ref 23–29)
CREAT SERPL-MCNC: 1.3 MG/DL (ref 0.5–1.4)
EST. GFR  (AFRICAN AMERICAN): 42.9 ML/MIN/1.73 M^2
EST. GFR  (NON AFRICAN AMERICAN): 37.2 ML/MIN/1.73 M^2
ESTIMATED AVG GLUCOSE: 126 MG/DL (ref 68–131)
GLUCOSE SERPL-MCNC: 94 MG/DL (ref 70–110)
HBA1C MFR BLD HPLC: 6 % (ref 4–5.6)
HDLC SERPL-MCNC: 49 MG/DL (ref 40–75)
HDLC SERPL: 21 % (ref 20–50)
LDLC SERPL CALC-MCNC: 155 MG/DL (ref 63–159)
MAGNESIUM SERPL-MCNC: 2 MG/DL (ref 1.6–2.6)
NONHDLC SERPL-MCNC: 184 MG/DL
POTASSIUM SERPL-SCNC: 4.2 MMOL/L (ref 3.5–5.1)
PROT SERPL-MCNC: 7.4 G/DL (ref 6–8.4)
SODIUM SERPL-SCNC: 140 MMOL/L (ref 136–145)
TRIGL SERPL-MCNC: 145 MG/DL (ref 30–150)

## 2019-08-01 PROCEDURE — 99213 OFFICE O/P EST LOW 20 MIN: CPT | Mod: HCNC,S$GLB,, | Performed by: FAMILY MEDICINE

## 2019-08-01 PROCEDURE — 83036 HEMOGLOBIN GLYCOSYLATED A1C: CPT | Mod: HCNC

## 2019-08-01 PROCEDURE — 80053 COMPREHEN METABOLIC PANEL: CPT | Mod: HCNC

## 2019-08-01 PROCEDURE — 80061 LIPID PANEL: CPT | Mod: HCNC

## 2019-08-01 PROCEDURE — 99213 PR OFFICE/OUTPT VISIT, EST, LEVL III, 20-29 MIN: ICD-10-PCS | Mod: HCNC,S$GLB,, | Performed by: FAMILY MEDICINE

## 2019-08-01 PROCEDURE — 99999 PR PBB SHADOW E&M-EST. PATIENT-LVL III: ICD-10-PCS | Mod: PBBFAC,HCNC,, | Performed by: FAMILY MEDICINE

## 2019-08-01 PROCEDURE — 99999 PR PBB SHADOW E&M-EST. PATIENT-LVL III: CPT | Mod: PBBFAC,HCNC,, | Performed by: FAMILY MEDICINE

## 2019-08-01 PROCEDURE — 1101F PR PT FALLS ASSESS DOC 0-1 FALLS W/OUT INJ PAST YR: ICD-10-PCS | Mod: HCNC,CPTII,S$GLB, | Performed by: FAMILY MEDICINE

## 2019-08-01 PROCEDURE — 83735 ASSAY OF MAGNESIUM: CPT | Mod: HCNC

## 2019-08-01 PROCEDURE — 1101F PT FALLS ASSESS-DOCD LE1/YR: CPT | Mod: HCNC,CPTII,S$GLB, | Performed by: FAMILY MEDICINE

## 2019-08-01 RX ORDER — TRIAMTERENE AND HYDROCHLOROTHIAZIDE 37.5; 25 MG/1; MG/1
1 CAPSULE ORAL EVERY MORNING
COMMUNITY
End: 2019-09-18

## 2019-08-01 NOTE — PROGRESS NOTES
"Subjective:       Patient ID: Shirley Metz is a 86 y.o. female.    Chief Complaint: Abdominal Pain (since last night)    HPI  Onset last night  0/10 now  Off and on for 3 months  Last BM this AM  No improvement with positional changes  Arnica helped  Concerns regarding diuretic causing abdominal pain  Worried diuretic affecting kidney function  Was seen recently by cards  Denies fever, weight loss, fmh cancers  Urinating helps pain  Characterizes as sharp and radiating from sides to front bilaterally  Review of Systems   Constitutional: Negative for fever and unexpected weight change.   Gastrointestinal: Positive for abdominal pain. Negative for abdominal distention and constipation.   Genitourinary: Positive for frequency. Negative for flank pain.   Musculoskeletal: Negative for myalgias.        Objective:   /64 (BP Location: Left arm, Patient Position: Sitting, BP Method: Large (Automatic))   Pulse 71   Temp 99.1 °F (37.3 °C) (Tympanic)   Ht 5' 4" (1.626 m)   Wt 77.3 kg (170 lb 8.4 oz)   SpO2 97%   BMI 29.27 kg/m²     BP Readings from Last 3 Encounters:   08/01/19 138/64   06/27/19 (!) 168/70   04/17/19 (!) 141/64       Lab Results   Component Value Date    HGBA1C 5.7 (H) 03/16/2018       Physical Exam   Constitutional: She is oriented to person, place, and time. She appears well-nourished. No distress.   HENT:   Head: Normocephalic and atraumatic.   Mouth/Throat: Oropharynx is clear and moist.   Eyes: Conjunctivae and EOM are normal. No scleral icterus.   Neck: Normal range of motion. Neck supple.   Cardiovascular: Normal rate, regular rhythm and normal heart sounds.   Pulmonary/Chest: Effort normal and breath sounds normal. She has no wheezes.   Abdominal: Soft. Bowel sounds are normal. There is no tenderness. There is no rebound, no CVA tenderness and negative Iraheta's sign.   Normal abdominal exam w/  Tympanic to percussion upper quadrants  Dullness to percussion lower quadrants  Neg " carnet sign  Neg peritoneal signs with heel strike   Musculoskeletal: She exhibits no edema or deformity.   Neurological: She is alert and oriented to person, place, and time.   Skin: Skin is warm and dry.   Abdominal scar from manuel, no s/s infection   Psychiatric: She has a normal mood and affect. Her behavior is normal.   Vitals reviewed.    Assessment:     1. Generalized abdominal pain    2. Essential hypertension    3. Pre-diabetes      Plan:     Problem List Items Addressed This Visit        Cardiac/Vascular    HTN (hypertension) (Chronic)    Overview     Fluctuating BP with Myalgias         Relevant Orders    Magnesium       Endocrine    Pre-diabetes      Other Visit Diagnoses     Generalized abdominal pain    -  Primary    Relevant Orders    Magnesium      released labs from PCP to eval electrolytes. Added mg  Abdominal exam reassuring. Likely msk cramping  Ok to continue arnica for sx  D/t allergy list, reluctant to add anything  Will defer management of antihypertensives/diuretic to pcp and/or cards    Follow up if symptoms worsen or fail to improve.

## 2019-08-01 NOTE — PATIENT INSTRUCTIONS
Unknown Causes of Abdominal Pain (Female)    The exact cause of your abdominal (stomach) pain is not clear. This does not mean that this is something to worry about. Everyone likes to know the exact cause of the problem, but sometimes with abdominal pain, there is no clear-cut cause, and this could be a good thing. The good news is that your symptoms can be treated, and you will feel better.   Your condition does not seem serious now; however, sometimes the signs of a serious problem may take more time to appear. For this reason, it is important for you to watch for any new symptoms, problems, or worsening of your condition.  Over the next few days, the abdominal pain may come and go, or be continuous. Other common symptoms can include nausea and vomiting. Sometimes it can be difficult to tell if you feel nauseous, you may just feel bad and not associate that feeling with nausea. Constipation, diarrhea, and a fever may go along with the pain.  The pain may continue even if treated correctly over the following days. Depending on how things go, sometimes the cause can become clear and may require further or different treatment. Additional evaluations, medications, or tests may also be needed.  Home care  Your healthcare provider may prescribe medicine for pain, symptoms, or an infection.  Follow the healthcare provider's instructions for taking these medicines.  General care  · Rest as much as you can until your next exam. No strenuous activities.  · Try to find positions that ease discomfort. A small pillow placed on the abdomen may help relieve pain.  · Something warm on your abdomen (such as a heating pad) may help, but be careful not to burn yourself.  Diet  · Do not force yourself to eat, especially if having cramps, vomiting, or diarrhea.  · Water is important so you do not get dehydrated. Soup may also be good. Sports drinks may also help, especially if they are not too acidic. Make sure you don't drink  sugary drinks as this can make things worse. Take liquids in small amounts. Do not guzzle them.  · Caffeine sometimes makes the pain and cramping worse.  · Avoid dairy products if you have vomiting or diarrhea.  · Don't eat large amounts at a time. Wait a few minutes between bites.  · Eat a diet low in fiber (called a low-residue diet). Foods allowed include refined breads, white rice, fruit and vegetable juices without pulp, tender meats. These foods will pass more easily through the intestine.  · Avoid whole-grain foods, whole fruits and vegetables, meats, seeds and nuts, fried or fatty foods, dairy, alcohol and spicy foods until your symptoms go away.  Follow-up care  Follow up with your healthcare provider, or as advised, if your pain does not begin to improve in the next 24 hours.  Call 911  Call 911 if any of these occur:  · Trouble breathing  · Confusion  · Fainting or loss of consciousness  · Rapid heart rate  · Seizure  When to seek medical advice  Call your healthcare provider right away if any of these occur:  · Pain gets worse or moves to the right lower abdomen  · New or worsening vomiting or diarrhea  · Swelling of the abdomen  · Unable to pass stool for more than 3 days  · Fever of 100.4ºF (38ºC) or higher, or as directed by your healthcare provider.  · Blood in vomit or bowel movements (dark red or black color)  · Jaundice (yellow color of eyes and skin)  · Weakness, dizziness  · Chest, arm, back, neck or jaw pain  · Unexpected vaginal bleeding or missed period  · Can't keep down liquids or water and are getting dehydrated  Date Last Reviewed: 12/30/2015  © 6696-1077 [employer]. [address] [place] [address], [place] [address]. All rights reserved. This information is not intended as a substitute for professional medical care. Always follow your healthcare professional's instructions.

## 2019-08-02 NOTE — PROGRESS NOTES
pls notify pt labs are stable. Cont to monitor sx, likely muscle-related. rtc if sx persist despite taking arnica

## 2019-08-07 ENCOUNTER — OFFICE VISIT (OUTPATIENT)
Dept: INTERNAL MEDICINE | Facility: CLINIC | Age: 84
End: 2019-08-07
Payer: MEDICARE

## 2019-08-07 VITALS
SYSTOLIC BLOOD PRESSURE: 154 MMHG | HEIGHT: 64 IN | WEIGHT: 163.13 LBS | TEMPERATURE: 98 F | HEART RATE: 66 BPM | BODY MASS INDEX: 27.85 KG/M2 | DIASTOLIC BLOOD PRESSURE: 73 MMHG | OXYGEN SATURATION: 98 %

## 2019-08-07 DIAGNOSIS — N18.30 CKD (CHRONIC KIDNEY DISEASE) STAGE 3, GFR 30-59 ML/MIN: ICD-10-CM

## 2019-08-07 DIAGNOSIS — R10.2 PELVIC PAIN: ICD-10-CM

## 2019-08-07 DIAGNOSIS — I10 ESSENTIAL HYPERTENSION: Chronic | ICD-10-CM

## 2019-08-07 DIAGNOSIS — R73.03 PRE-DIABETES: ICD-10-CM

## 2019-08-07 DIAGNOSIS — R82.90 ABNORMAL URINE FINDINGS: Primary | ICD-10-CM

## 2019-08-07 LAB
BILIRUB SERPL-MCNC: NEGATIVE MG/DL
BILIRUB UR QL STRIP: NEGATIVE
BLOOD URINE, POC: 50
CLARITY UR REFRACT.AUTO: CLEAR
COLOR UR AUTO: YELLOW
COLOR, POC UA: ABNORMAL
GLUCOSE UR QL STRIP: NEGATIVE
GLUCOSE UR QL STRIP: NORMAL
HGB UR QL STRIP: NEGATIVE
KETONES UR QL STRIP: NEGATIVE
KETONES UR QL STRIP: NEGATIVE
LEUKOCYTE ESTERASE UR QL STRIP: NEGATIVE
LEUKOCYTE ESTERASE URINE, POC: ABNORMAL
NITRITE UR QL STRIP: NEGATIVE
NITRITE, POC UA: NEGATIVE
PH UR STRIP: 7 [PH] (ref 5–8)
PH, POC UA: 7
PROT UR QL STRIP: NEGATIVE
PROTEIN, POC: ABNORMAL
SP GR UR STRIP: 1.01 (ref 1–1.03)
SPECIFIC GRAVITY, POC UA: 1.01
URN SPEC COLLECT METH UR: NORMAL
UROBILINOGEN, POC UA: NORMAL

## 2019-08-07 PROCEDURE — 81003 URINALYSIS AUTO W/O SCOPE: CPT | Mod: HCNC

## 2019-08-07 PROCEDURE — 99213 PR OFFICE/OUTPT VISIT, EST, LEVL III, 20-29 MIN: ICD-10-PCS | Mod: 25,HCNC,S$GLB, | Performed by: FAMILY MEDICINE

## 2019-08-07 PROCEDURE — 81002 URINALYSIS NONAUTO W/O SCOPE: CPT | Mod: HCNC,S$GLB,, | Performed by: FAMILY MEDICINE

## 2019-08-07 PROCEDURE — 1101F PR PT FALLS ASSESS DOC 0-1 FALLS W/OUT INJ PAST YR: ICD-10-PCS | Mod: HCNC,CPTII,S$GLB, | Performed by: FAMILY MEDICINE

## 2019-08-07 PROCEDURE — 87086 URINE CULTURE/COLONY COUNT: CPT | Mod: HCNC

## 2019-08-07 PROCEDURE — 99999 PR PBB SHADOW E&M-EST. PATIENT-LVL III: CPT | Mod: PBBFAC,HCNC,, | Performed by: FAMILY MEDICINE

## 2019-08-07 PROCEDURE — 99213 OFFICE O/P EST LOW 20 MIN: CPT | Mod: 25,HCNC,S$GLB, | Performed by: FAMILY MEDICINE

## 2019-08-07 PROCEDURE — 81002 POCT URINE DIPSTICK WITHOUT MICROSCOPE: ICD-10-PCS | Mod: HCNC,S$GLB,, | Performed by: FAMILY MEDICINE

## 2019-08-07 PROCEDURE — 99999 PR PBB SHADOW E&M-EST. PATIENT-LVL III: ICD-10-PCS | Mod: PBBFAC,HCNC,, | Performed by: FAMILY MEDICINE

## 2019-08-07 PROCEDURE — 1101F PT FALLS ASSESS-DOCD LE1/YR: CPT | Mod: HCNC,CPTII,S$GLB, | Performed by: FAMILY MEDICINE

## 2019-08-07 RX ORDER — LOSARTAN POTASSIUM 50 MG/1
50 TABLET ORAL 2 TIMES DAILY
Qty: 180 TABLET | Refills: 3 | Status: ON HOLD | OUTPATIENT
Start: 2019-08-07 | End: 2019-10-11 | Stop reason: HOSPADM

## 2019-08-07 NOTE — PATIENT INSTRUCTIONS
Check your old meds for the HCTZ 12.5 (hydrochlorothiazide) and you may use that occasionally for increased fluid accumulation.

## 2019-08-07 NOTE — PROGRESS NOTES
Subjective:       Patient ID: Shirley Metz is a 86 y.o. female.    Chief Complaint: Cystitis (not taking fluid pill)    She has had problems with low abdominal pain last few days. She is concerned about possible UTI/bladder infection.  Pain is off and on. No fever.     Having urinary frequency still - nocturia x 4 last night - usually x3. Quantity is less now not taking any diuretic.  Reviewed in detail the history of attempt to adjust her dyazide - triamterene alone was not covered by insurance - she was back on dyazide  Until her visit for abdominal pain 8/1/19. She has not taken the diuretic since then.    Her BP checks have been 130s - 140s. Occasionally low 112/xx and she takes 1/2 clonidine at night if BP < 140, this occurs about 50% of the time.    Lab Results       Component                Value               Date                       WBC                      6.32                03/04/2019                 HGB                      12.5                03/04/2019                 HCT                      38.9                03/04/2019                 PLT                      259                 03/04/2019                 CHOL                     233 (H)             08/01/2019                 TRIG                     145                 08/01/2019                 HDL                      49                  08/01/2019                 LDLCALC                  155.0               08/01/2019                 ALT                      14                  08/01/2019                 AST                      21                  08/01/2019                 NA                       140                 08/01/2019                 K                        4.2                 08/01/2019                 CL                       104                 08/01/2019                 CALCIUM                  10.0                08/01/2019                 CREATININE               1.3                 08/01/2019                  BUN                      30 (H)              08/01/2019                 CO2                      24                  08/01/2019                 TSH                      1.871               01/04/2017                 INR                      1.0                 01/25/2018                 GLU                      94                  08/01/2019                 ESTGFRAFRICA             42.9 (A)            08/01/2019                 EGFRNONAA                37.2 (A)            08/01/2019                 HGBA1C                   6.0 (H)             08/01/2019            reviewd labs including increase in lipids ocver last time - not tolerate statins.    Review of Systems   Constitutional: Positive for unexpected weight change. Negative for activity change.   HENT: Negative for hearing loss, rhinorrhea and trouble swallowing.    Eyes: Positive for visual disturbance. Negative for discharge.   Respiratory: Negative for chest tightness and wheezing.    Cardiovascular: Negative for chest pain and palpitations.   Gastrointestinal: Negative for blood in stool, constipation, diarrhea and vomiting.   Endocrine: Positive for polyuria. Negative for polydipsia.   Genitourinary: Negative for difficulty urinating, dysuria, hematuria and menstrual problem.   Musculoskeletal: Positive for arthralgias (shoulders painful in AM) and joint swelling (fingers - swell and sometimes they hurt - uses arnica topically, hot water). Negative for neck pain.   Neurological: Positive for weakness (legs get weak with distance - uses rolling seated walker to got o dining valles etc at [facility] living). Negative for headaches.   Psychiatric/Behavioral: Negative for confusion and dysphoric mood.       Objective:      Physical Exam   Constitutional: She is oriented to person, place, and time. She appears well-developed and well-nourished.   HENT:   Head: Normocephalic and atraumatic.   Right Ear: External ear normal.   Left Ear: External ear normal.    Mouth/Throat: Oropharynx is clear and moist. No oropharyngeal exudate.   Neck: Normal range of motion. Neck supple.   Cardiovascular: Normal rate, regular rhythm and normal heart sounds.   Pulmonary/Chest: Effort normal and breath sounds normal.   Abdominal: Soft. Bowel sounds are normal. She exhibits no distension. There is tenderness (mild TTP just prox to inguinal fold R>L otherwise ab NTTP).   No CVA tenderness B   Neurological: She is alert and oriented to person, place, and time.   Skin: Skin is warm and dry.   Psychiatric: She has a normal mood and affect. Her behavior is normal.         Assessment/Plan:     1. Abnormal urine findings  Urinalysis    Urine culture   2. Essential hypertension  losartan (COZAAR) 50 MG tablet    Basic metabolic panel    CBC auto differential   3. Pelvic pain  POCT urine dipstick without microscope   4. Pre-diabetes  Hemoglobin A1c   5. CKD (chronic kidney disease) stage 3, GFR 30-59 ml/min  Basic metabolic panel    Check urine.  Continue on losartan without the hydrochlorothiazide.  She has some 12.5 tablets and can use them for episodes of perceived swelling.  She prefers to defer theBMD - wait 3 yrs per her preference - reviewed stable BMD 2015/2017 with borderline osteopenia hip only.  She prefers to continue obtaining mammograms; every 2 years.

## 2019-08-08 DIAGNOSIS — M25.512 LEFT SHOULDER PAIN, UNSPECIFIED CHRONICITY: Primary | ICD-10-CM

## 2019-08-08 LAB — BACTERIA UR CULT: NORMAL

## 2019-08-15 ENCOUNTER — OFFICE VISIT (OUTPATIENT)
Dept: OPHTHALMOLOGY | Facility: CLINIC | Age: 84
End: 2019-08-15
Payer: MEDICARE

## 2019-08-15 ENCOUNTER — PATIENT MESSAGE (OUTPATIENT)
Dept: INTERNAL MEDICINE | Facility: CLINIC | Age: 84
End: 2019-08-15

## 2019-08-15 DIAGNOSIS — Z96.1 PSEUDOPHAKIA: ICD-10-CM

## 2019-08-15 DIAGNOSIS — M35.01 KERATITIS SICCA, BILATERAL: ICD-10-CM

## 2019-08-15 DIAGNOSIS — H40.1134 PRIMARY OPEN ANGLE GLAUCOMA OF BOTH EYES, INDETERMINATE STAGE: Primary | ICD-10-CM

## 2019-08-15 PROCEDURE — 92133 POSTERIOR SEGMENT OCT OPTIC NERVE(OCULAR COHERENCE TOMOGRAPHY) - OU - BOTH EYES: ICD-10-PCS | Mod: HCNC,S$GLB,, | Performed by: OPHTHALMOLOGY

## 2019-08-15 PROCEDURE — 92133 CPTRZD OPH DX IMG PST SGM ON: CPT | Mod: HCNC,S$GLB,, | Performed by: OPHTHALMOLOGY

## 2019-08-15 PROCEDURE — 99999 PR PBB SHADOW E&M-EST. PATIENT-LVL II: ICD-10-PCS | Mod: PBBFAC,HCNC,, | Performed by: OPHTHALMOLOGY

## 2019-08-15 PROCEDURE — 99999 PR PBB SHADOW E&M-EST. PATIENT-LVL II: CPT | Mod: PBBFAC,HCNC,, | Performed by: OPHTHALMOLOGY

## 2019-08-15 PROCEDURE — 92012 INTRM OPH EXAM EST PATIENT: CPT | Mod: HCNC,S$GLB,, | Performed by: OPHTHALMOLOGY

## 2019-08-15 PROCEDURE — 92012 PR EYE EXAM, EST PATIENT,INTERMED: ICD-10-PCS | Mod: HCNC,S$GLB,, | Performed by: OPHTHALMOLOGY

## 2019-08-15 NOTE — PROGRESS NOTES
SUBJECTIVE:   Shirley Metz is a 86 y.o. female   Corrected distance visual acuity was 20/30 -1 in the right eye and 20/25 -1 in the left eye.   Chief Complaint   Patient presents with    Glaucoma     3 mth IOP check and GOCT. pt wants new glasses rx. svl distance and svl near         HPI:  HPI     Glaucoma      Additional comments: 3 mth IOP check and GOCT. pt wants new glasses rx.   svl distance and svl near               Comments     1. Mild COAG Goal < 19  SLT OD 3/04 (20 to 15) + 3/11 (19 to 15)  SLT OS 5/05 (20 to 14) +5/11 (18-19 to 15) + 3/12 (min resp.) + 3/11/15   (20.5-17.5) + 3/7/18 (24- 21)  (Cosopt, Xalatan, and Timolol cause Myalgias)  (Alphagan causes redness) (zioptan too expensive)  2. PCIOL OU  3. Dry AMD  4. Fuch's Dystrophy   DSAEK OD 4/16 Dr. Quintanilla (followed by [facility] every summer)  DSAEK OS 8/15 Dr. Quintanilla  5. Dry Eye -intolerance to Restasis       Lotemax Mon, Wed, Fri and Sun once daily OU  Systane Ultra 4-5 tiimes daily  Travatan Z qhs os  Rhopressa qhs OS          Last edited by Sofai Vizcaino MA on 8/15/2019 10:14 AM. (History)        Assessment /Plan :  1. Primary open angle glaucoma of both eyes, indeterminate stage Doing well, IOP within acceptable range relative to target IOP and no evidence of progression. Continue current treatment. Reviewed importance of continued compliance with treatment and follow up.     2. Pseudophakia  -- Condition stable, no therapeutic change required. Monitoring routinely.     3. Keratitis sicca, bilateral  Cont systane qid ou     Return to clinic in 4 months  or as needed.  With IOP Check

## 2019-08-19 NOTE — TELEPHONE ENCOUNTER
SPoke with pt by phone - states after getting sleep few nights it is in the 130s.     States with HCTZ 12.5 - frequent urination but less amount than with dyazide in past. Still urinates a lot on days she does not take the HCTZ 12.5.  She does continue to drink water in good amounts.  I reviewed her most recent Chem panel and a UA done earlier this month and see no reason to see Nephrology.  She continues with CKD 3 but it is relatively stable.    Regarding her question of whether to add metoprolol when she notes high blood pressures I advised that she could take a 3rd clonidine 0.1 mg.  She was taking it b.i.d. during the time she was having high blood pressures.  Usually takes it once at night.

## 2019-09-03 DIAGNOSIS — H40.1134 PRIMARY OPEN ANGLE GLAUCOMA OF BOTH EYES, INDETERMINATE STAGE: ICD-10-CM

## 2019-09-03 RX ORDER — TRAVOPROST 0.04 MG/ML
SOLUTION/ DROPS OPHTHALMIC
Qty: 2.5 ML | Refills: 12 | Status: SHIPPED | OUTPATIENT
Start: 2019-09-03 | End: 2020-11-12

## 2019-09-04 ENCOUNTER — PATIENT OUTREACH (OUTPATIENT)
Dept: ADMINISTRATIVE | Facility: HOSPITAL | Age: 84
End: 2019-09-04

## 2019-09-04 ENCOUNTER — TELEPHONE (OUTPATIENT)
Dept: ORTHOPEDICS | Facility: CLINIC | Age: 84
End: 2019-09-04

## 2019-09-04 NOTE — TELEPHONE ENCOUNTER
Called pt to reschedule her apt due to provider being out of office. Pt is now reschedule to 9/24. Pt was reminded to arrive early due to x-ray. Pt understood.

## 2019-09-12 ENCOUNTER — PATIENT MESSAGE (OUTPATIENT)
Dept: INTERNAL MEDICINE | Facility: CLINIC | Age: 84
End: 2019-09-12

## 2019-09-12 NOTE — TELEPHONE ENCOUNTER
Patient has a lab appointment scheduled on 09/16/2019 for CBC,BMP, and A1C.  On 08/01/2019, patient had a CMP, Lipid, magnesium, and A1C done.  Please advise if the patient has to have other labs done on 09/16/2019.

## 2019-09-16 ENCOUNTER — CLINICAL SUPPORT (OUTPATIENT)
Dept: INTERNAL MEDICINE | Facility: CLINIC | Age: 84
End: 2019-09-16
Payer: MEDICARE

## 2019-09-16 DIAGNOSIS — N18.30 CKD (CHRONIC KIDNEY DISEASE) STAGE 3, GFR 30-59 ML/MIN: ICD-10-CM

## 2019-09-16 DIAGNOSIS — I10 ESSENTIAL HYPERTENSION: Chronic | ICD-10-CM

## 2019-09-16 PROCEDURE — 36415 COLL VENOUS BLD VENIPUNCTURE: CPT | Mod: HCNC,S$GLB,, | Performed by: FAMILY MEDICINE

## 2019-09-16 PROCEDURE — 36415 PR COLLECTION VENOUS BLOOD,VENIPUNCTURE: ICD-10-PCS | Mod: HCNC,S$GLB,, | Performed by: FAMILY MEDICINE

## 2019-09-16 PROCEDURE — 85025 COMPLETE CBC W/AUTO DIFF WBC: CPT | Mod: HCNC

## 2019-09-16 NOTE — PROGRESS NOTES
Patient here to have her labs drawn.  Patient just had labs done on 08/01/2019 and wanted to know why she had to do a BMP and she has completed a CMP on 08/01/2019.  Had  to take a look at the patient's labs again and was advised to only do a CBC.  CBC drawn successfully.

## 2019-09-17 LAB
BASOPHILS # BLD AUTO: 0.05 K/UL (ref 0–0.2)
BASOPHILS NFR BLD: 0.8 % (ref 0–1.9)
DIFFERENTIAL METHOD: ABNORMAL
EOSINOPHIL # BLD AUTO: 0.2 K/UL (ref 0–0.5)
EOSINOPHIL NFR BLD: 3 % (ref 0–8)
ERYTHROCYTE [DISTWIDTH] IN BLOOD BY AUTOMATED COUNT: 13.7 % (ref 11.5–14.5)
HCT VFR BLD AUTO: 35.1 % (ref 37–48.5)
HGB BLD-MCNC: 11 G/DL (ref 12–16)
IMM GRANULOCYTES # BLD AUTO: 0.01 K/UL (ref 0–0.04)
IMM GRANULOCYTES NFR BLD AUTO: 0.2 % (ref 0–0.5)
LYMPHOCYTES # BLD AUTO: 1.6 K/UL (ref 1–4.8)
LYMPHOCYTES NFR BLD: 24.6 % (ref 18–48)
MCH RBC QN AUTO: 31.6 PG (ref 27–31)
MCHC RBC AUTO-ENTMCNC: 31.3 G/DL (ref 32–36)
MCV RBC AUTO: 101 FL (ref 82–98)
MONOCYTES # BLD AUTO: 0.6 K/UL (ref 0.3–1)
MONOCYTES NFR BLD: 9.6 % (ref 4–15)
NEUTROPHILS # BLD AUTO: 3.9 K/UL (ref 1.8–7.7)
NEUTROPHILS NFR BLD: 61.8 % (ref 38–73)
NRBC BLD-RTO: 0 /100 WBC
PLATELET # BLD AUTO: 206 K/UL (ref 150–350)
PMV BLD AUTO: 11 FL (ref 9.2–12.9)
RBC # BLD AUTO: 3.48 M/UL (ref 4–5.4)
WBC # BLD AUTO: 6.34 K/UL (ref 3.9–12.7)

## 2019-09-18 ENCOUNTER — OFFICE VISIT (OUTPATIENT)
Dept: INTERNAL MEDICINE | Facility: CLINIC | Age: 84
End: 2019-09-18
Payer: MEDICARE

## 2019-09-18 VITALS
SYSTOLIC BLOOD PRESSURE: 155 MMHG | WEIGHT: 171.75 LBS | HEIGHT: 64 IN | HEART RATE: 65 BPM | TEMPERATURE: 99 F | DIASTOLIC BLOOD PRESSURE: 69 MMHG | BODY MASS INDEX: 29.32 KG/M2 | OXYGEN SATURATION: 97 %

## 2019-09-18 DIAGNOSIS — Z79.899 ON POTASSIUM WASTING DIURETIC THERAPY: ICD-10-CM

## 2019-09-18 DIAGNOSIS — I10 UNCONTROLLED HYPERTENSION: Primary | ICD-10-CM

## 2019-09-18 DIAGNOSIS — N18.30 CKD (CHRONIC KIDNEY DISEASE) STAGE 3, GFR 30-59 ML/MIN: ICD-10-CM

## 2019-09-18 DIAGNOSIS — R53.1 WEAKNESS: ICD-10-CM

## 2019-09-18 DIAGNOSIS — R53.83 FATIGUE, UNSPECIFIED TYPE: ICD-10-CM

## 2019-09-18 DIAGNOSIS — R35.0 URINARY FREQUENCY: ICD-10-CM

## 2019-09-18 DIAGNOSIS — Z78.9 MEDICATION INTOLERANCE: ICD-10-CM

## 2019-09-18 LAB
BILIRUB SERPL-MCNC: NEGATIVE MG/DL
BLOOD URINE, POC: ABNORMAL
COLOR, POC UA: YELLOW
GLUCOSE UR QL STRIP: NORMAL
KETONES UR QL STRIP: NEGATIVE
LEUKOCYTE ESTERASE URINE, POC: NEGATIVE
NITRITE, POC UA: NEGATIVE
PH, POC UA: 6
PROTEIN, POC: ABNORMAL
SPECIFIC GRAVITY, POC UA: 1.01
UROBILINOGEN, POC UA: NORMAL

## 2019-09-18 PROCEDURE — 80048 BASIC METABOLIC PNL TOTAL CA: CPT | Mod: HCNC

## 2019-09-18 PROCEDURE — 99214 PR OFFICE/OUTPT VISIT, EST, LEVL IV, 30-39 MIN: ICD-10-PCS | Mod: 25,HCNC,S$GLB, | Performed by: FAMILY MEDICINE

## 2019-09-18 PROCEDURE — 81002 POCT URINE DIPSTICK WITHOUT MICROSCOPE: ICD-10-PCS | Mod: HCNC,S$GLB,, | Performed by: FAMILY MEDICINE

## 2019-09-18 PROCEDURE — 1101F PR PT FALLS ASSESS DOC 0-1 FALLS W/OUT INJ PAST YR: ICD-10-PCS | Mod: HCNC,CPTII,S$GLB, | Performed by: FAMILY MEDICINE

## 2019-09-18 PROCEDURE — 99214 OFFICE O/P EST MOD 30 MIN: CPT | Mod: 25,HCNC,S$GLB, | Performed by: FAMILY MEDICINE

## 2019-09-18 PROCEDURE — 81002 URINALYSIS NONAUTO W/O SCOPE: CPT | Mod: HCNC,S$GLB,, | Performed by: FAMILY MEDICINE

## 2019-09-18 PROCEDURE — 1101F PT FALLS ASSESS-DOCD LE1/YR: CPT | Mod: HCNC,CPTII,S$GLB, | Performed by: FAMILY MEDICINE

## 2019-09-18 PROCEDURE — 99999 PR PBB SHADOW E&M-EST. PATIENT-LVL V: ICD-10-PCS | Mod: PBBFAC,HCNC,, | Performed by: FAMILY MEDICINE

## 2019-09-18 PROCEDURE — 84443 ASSAY THYROID STIM HORMONE: CPT | Mod: HCNC

## 2019-09-18 PROCEDURE — 99999 PR PBB SHADOW E&M-EST. PATIENT-LVL V: CPT | Mod: PBBFAC,HCNC,, | Performed by: FAMILY MEDICINE

## 2019-09-18 RX ORDER — HYDROCHLOROTHIAZIDE 12.5 MG/1
12.5 CAPSULE ORAL DAILY
COMMUNITY
End: 2019-10-07

## 2019-09-18 RX ORDER — NETARSUDIL 0.2 MG/ML
SOLUTION/ DROPS OPHTHALMIC; TOPICAL DAILY
COMMUNITY
Start: 2019-08-15 | End: 2020-06-02

## 2019-09-18 NOTE — PATIENT INSTRUCTIONS
Potassium-Rich Foods  The normal adult diet usually contains 2,000 mg to 4,000 mg of potassium per day. More potassium is needed when you lose too much potassium from your body. This can happen if you have diarrhea or vomiting. It can also happen if you take a medicine to make you urinate more (diuretic). To increase the amount of potassium in your diet, include these high-potassium foods.     [The (*) indicates foods highest in potassium.]  Vegetables  Artichokes. Cooked 1/2 cup, 200 mg to 300 mg*  Asparagus. Cooked 1/2 cup, 200 mg to 300 mg  Beans. White, red, macias cooked 1/2 cup, 300 mg to 500 mg*  Beets. Cooked 1/2 cup, 200 mg to 300 mg  Broccoli. Cooked or raw 1 cup, 200 mg to 500 mg*  Josephine sprouts. Cooked 1/2 cup, 200 mg to 300 mg  Cabbage. Raw 1 cup, 100 mg to 200 mg  Carrots. Raw or cooked 1/2 cup, 100 mg to 200 mg  Celery. Raw 1 cup, 200 mg to 300 mg  Lima beans. Fresh or frozen 1/2 cup, 300 mg to 500 mg*   Mushrooms. Raw or cooked 1/2 cup, 100 mg to 300 mg  Peas. Cooked 1/2 cup, 150 mg to 250 mg   Potatoes. Baked 1 medium, 500 mg to 900 mg*   Spinach. Cooked 1 cup, 800 mg to 900 mg*   Spinach. Raw 2 cups, 300 mg to 400 mg *  Squash, winter. Fresh, frozen, or cooked 1/2 cup, 200 mg to 400 mg   Tomato. Fresh 1 medium, 200 mg to 300 mg   Tomato juice. Canned 1/2 cup, 200 mg to 300 mg   Fruits  Apple juice. Unsweetened 1 cup, 200 mg to 300 mg   Apricots. Canned 1/2 cup, 200 mg to 300 mg   Apricots. Dried 4 pieces, 100 mg to 200 mg   Avocado. Raw 1/2 cup, 300 mg to 400 mg*  Banana. Fresh 1 small, 300 mg to 400 mg*   Cantaloupe. Fresh 1 cup diced, 300 mg to 400 mg*   Grape juice. Unsweetened 1 cup, 200 mg to 300 mg   Honeydew melon. Fresh 1 cup diced, 300 mg to 400 mg*   Orange. Fresh 1 medium, 200 mg to 300 mg    Orange juice. Unsweetened, fresh or frozen 1/2 cup, 200 mg to 300 mg  Pineapple juice. Unsweetened 1 cup, 300 mg to 400 mg   Prune juice. Unsweetened 1/2 cup, 300 mg to 400 mg*   Prunes. Dried 5  pieces, 300 mg to 400 mg*   Strawberries. Fresh or frozen 1 cup, 200 mg to 300 mg  Meat  Red meat. Cooked 3 ounces, 100 mg to 300 mg   Seafood  Cod, flounder, halibut. Cooked 3 ounces, 100 mg to 300 mg*  Lyndon. Cooked, 3 ounces 300 mg to 400 mg*   Scallops. Cooked 3 ounces, 200 mg to 300 mg*  Shrimp. Cooked 3/4 cup, 100 mg to 200 mg   Tuna. Fresh or canned 3/4 cup, 200 mg to 500 mg   Date Last Reviewed: 10/1/2016  © 9089-2352 [employer]. [address] [place] [address], [place] [address]. All rights reserved. This information is not intended as a substitute for professional medical care. Always follow your healthcare professional's instructions.

## 2019-09-18 NOTE — PROGRESS NOTES
Subjective:       Patient ID: Shirley Metz is a 86 y.o. female.    Chief Complaint: lab follow up    She had scheduled a visit and got some lab work preceding it.  Only the CBC was drawn.  She will need a repeat on the BMP if we think it is needed. Last seen about 6 weeks ago with concerns about diuretic use and hypertension.  That time she was to stay off diuretics at her last visit and use occasional p.r.n. 12.5 hydrochlorothiazide for perceived swelling. States she stayed off it as well as the Dyazide for about 2 weeks.  She was monitoring her urination.  She states it was no more frequent the back on the diuretic but she had larger quantities of urine each time.  She has stayed on the hydrochlorothiazide daily for about 2 weeks possibly more.  She has been concerned about high blood pressures ranging up into the 160s systolic but in the 70s diastolic.  For the past couple weeks she has been taking the clonidine b.i.d..  She will then take an extra 1/2 tablet at bedtime if her blood pressure is still up. She is continuing to take her losartan 50 b.i.d. and is asking if she can have a higher dose of that.    She states she has been having weakness for the last 3 or 4 weeks.  She states she is stumbling.  She states she has been so weak she can't get down stairs some days.  She uses a walker with a seat, has an elevator in her house.  She notes increased shortness of breath with exertion.    BP Readings from Last 3 Encounters:  09/18/19 : (!) 155/69  08/07/19 : (!) 154/73  08/01/19 : 138/64  Hypertension still uncontrolled.  My BP check is 164/70.  Hypertension Medications      losartan (COZAAR) 50 MG tablet Take 1 tablet (50 mg total) BID    cloNIDine (CATAPRES) 0.1 MG tablet Take 1 tablet (0.1 mg total) BID - only takes PM dose until recently taking BID.  Lab Results       Component                Value               Date                       WBC                      6.34                09/16/2019                  HGB                      11.0 (L)            09/16/2019                 HCT                      35.1 (L)            09/16/2019                 PLT                      206                 09/16/2019                 CHOL                     233 (H)             08/01/2019                 TRIG                     145                 08/01/2019                 HDL                      49                  08/01/2019                 LDLCALC                  155.0               08/01/2019                 ALT                      14                  08/01/2019                 AST                      21                  08/01/2019                 NA                       140                 08/01/2019                 K                        4.2                 08/01/2019                 CL                       104                 08/01/2019                 CALCIUM                  10.0                08/01/2019                 CREATININE               1.3                 08/01/2019                 BUN                      30 (H)              08/01/2019                 CO2                      24                  08/01/2019                 TSH                      1.871               01/04/2017                 INR                      1.0                 01/25/2018                 GLU                      94                  08/01/2019                 ESTGFRAFRICA             42.9 (A)            08/01/2019                 EGFRNONAA                37.2 (A)            08/01/2019                 HGBA1C                   6.0 (H)             08/01/2019            EGFR has been stable at 37.  No significant change to her mild anemia.  She does have macrocytosis and B12 is normal 6 months ago.    She states she has been on daily MiraLax for a long time and over the last 2-4 weeks has doubled that to BID as well as taking a stool softener daily.    She is c/o urinary freq - getting up too often at night - lack of  "sleep may be contributing to weakness.  She is having leg cramps again mostly at night. She c/o contd "swelling" to her chest at end of day - takes off bra and is more comfortable.      Hypertension   This is a chronic problem. The current episode started more than 1 year ago. The problem has been waxing and waning since onset. The problem is resistant. Associated symptoms include anxiety, malaise/fatigue and shortness of breath. Pertinent negatives include no blurred vision, chest pain, headaches, neck pain, orthopnea, palpitations, peripheral edema, PND or sweats. Agents associated with hypertension include NSAIDs. Risk factors for coronary artery disease include stress. Past treatments include ACE inhibitors, beta blockers, calcium channel blockers and diuretics. The current treatment provides mild improvement. Compliance problems include medication side effects.      Review of Systems   Constitutional: Positive for fatigue and malaise/fatigue.   Eyes: Negative for blurred vision.   Respiratory: Positive for shortness of breath.    Cardiovascular: Negative for chest pain, palpitations, orthopnea and PND.   Gastrointestinal: Positive for constipation.   Musculoskeletal: Positive for arthralgias (stiff painful on getting up - to shoulder girdle and hip girdle - clears with movemenet) and gait problem. Negative for neck pain.   Neurological: Positive for weakness. Negative for headaches.       Objective:      Physical Exam   Constitutional: She is oriented to person, place, and time. She appears well-developed.   HENT:   Head: Normocephalic and atraumatic.   Cardiovascular: Normal rate, regular rhythm and normal heart sounds.   Pulmonary/Chest: Effort normal and breath sounds normal.   Musculoskeletal: She exhibits no edema.   Neurological: She is alert and oriented to person, place, and time.   Skin: Skin is warm and dry.   Psychiatric: She has a normal mood and affect. Her behavior is normal.     "     Assessment/Plan:     1. Uncontrolled hypertension  Ambulatory referral to Nephrology    Basic metabolic panel   2. Weakness     3. Medication intolerance  Ambulatory referral to Nephrology   4. Urinary frequency  POCT urine dipstick without microscope   5. Fatigue, unspecified type  TSH   6. CKD (chronic kidney disease) stage 3, GFR 30-59 ml/min  Ambulatory referral to Nephrology   7. On potassium wasting diuretic therapy     will refer to renal for HTN help in pt with multiple med intolerances, CKD 3.  Would benefit from digital medicine program.  Check the BMP again now she is back on diuretic. K+ rich foods list given. Check TSH for perceived weakness.  Will await labs, recheck 6 weeks and prn. Continue current losartan 50 BID and HCTZ 12.5. Clonidine not preferred med for her 2/2 SEs at increased dose and non-24 hour coverage.  Look forward to assistance from renal.  Has upcoming recheck appt with cards 2 weeks.

## 2019-09-19 LAB
ANION GAP SERPL CALC-SCNC: 10 MMOL/L (ref 8–16)
BUN SERPL-MCNC: 28 MG/DL (ref 8–23)
CALCIUM SERPL-MCNC: 10 MG/DL (ref 8.7–10.5)
CHLORIDE SERPL-SCNC: 103 MMOL/L (ref 95–110)
CO2 SERPL-SCNC: 26 MMOL/L (ref 23–29)
CREAT SERPL-MCNC: 1.3 MG/DL (ref 0.5–1.4)
EST. GFR  (AFRICAN AMERICAN): 42.9 ML/MIN/1.73 M^2
EST. GFR  (NON AFRICAN AMERICAN): 37.2 ML/MIN/1.73 M^2
GLUCOSE SERPL-MCNC: 108 MG/DL (ref 70–110)
POTASSIUM SERPL-SCNC: 4 MMOL/L (ref 3.5–5.1)
SODIUM SERPL-SCNC: 139 MMOL/L (ref 136–145)
TSH SERPL DL<=0.005 MIU/L-ACNC: 1.14 UIU/ML (ref 0.4–4)

## 2019-09-23 ENCOUNTER — OFFICE VISIT (OUTPATIENT)
Dept: NEPHROLOGY | Facility: CLINIC | Age: 84
End: 2019-09-23
Payer: MEDICARE

## 2019-09-23 VITALS
BODY MASS INDEX: 29.59 KG/M2 | HEART RATE: 80 BPM | HEIGHT: 64 IN | WEIGHT: 173.31 LBS | SYSTOLIC BLOOD PRESSURE: 160 MMHG | DIASTOLIC BLOOD PRESSURE: 62 MMHG

## 2019-09-23 DIAGNOSIS — N18.30 CKD (CHRONIC KIDNEY DISEASE) STAGE 3, GFR 30-59 ML/MIN: Primary | ICD-10-CM

## 2019-09-23 DIAGNOSIS — R35.0 URINARY FREQUENCY: ICD-10-CM

## 2019-09-23 PROCEDURE — 99999 PR PBB SHADOW E&M-EST. PATIENT-LVL III: CPT | Mod: PBBFAC,HCNC,, | Performed by: INTERNAL MEDICINE

## 2019-09-23 PROCEDURE — 1101F PT FALLS ASSESS-DOCD LE1/YR: CPT | Mod: HCNC,CPTII,S$GLB, | Performed by: INTERNAL MEDICINE

## 2019-09-23 PROCEDURE — 99999 PR PBB SHADOW E&M-EST. PATIENT-LVL III: ICD-10-PCS | Mod: PBBFAC,HCNC,, | Performed by: INTERNAL MEDICINE

## 2019-09-23 PROCEDURE — 99214 PR OFFICE/OUTPT VISIT, EST, LEVL IV, 30-39 MIN: ICD-10-PCS | Mod: HCNC,S$GLB,, | Performed by: INTERNAL MEDICINE

## 2019-09-23 PROCEDURE — 99214 OFFICE O/P EST MOD 30 MIN: CPT | Mod: HCNC,S$GLB,, | Performed by: INTERNAL MEDICINE

## 2019-09-23 PROCEDURE — 1101F PR PT FALLS ASSESS DOC 0-1 FALLS W/OUT INJ PAST YR: ICD-10-PCS | Mod: HCNC,CPTII,S$GLB, | Performed by: INTERNAL MEDICINE

## 2019-09-23 RX ORDER — LABETALOL 100 MG/1
100 TABLET, FILM COATED ORAL 2 TIMES DAILY
Qty: 60 TABLET | Refills: 11 | Status: SHIPPED | OUTPATIENT
Start: 2019-09-23 | End: 2019-10-07 | Stop reason: ALTCHOICE

## 2019-09-23 NOTE — LETTER
September 23, 2019      Yandy Terrell MD  [address] [place] [address] [address] [place] [id number]           [facility]  [address] [place] [address]  [place] [id number]  Phone: [phone]  Fax: [phone]          Patient: Shirley Metz   MR Number: [id number]   YOB: 1932   Date of Visit: 9/23/2019       Dear Dr. Yandy Terrell:    Thank you for referring Shirley Metz to me for evaluation. Attached you will find relevant portions of my assessment and plan of care.    If you have questions, please do not hesitate to call me. I look forward to following Shirley Metz along with you.    Sincerely,    Sven Cortez MD    Enclosure  CC:  No Recipients    If you would like to receive this communication electronically, please contact [email or web address] or [phone] to request more information on [employer] Link access.    For providers and/or their staff who would like to refer a patient to [facility], please contact us through our one-stop-shop provider referral line, [facility], at [phone].    If you feel you have received this communication in error or would no longer like to receive these types of communications, please e-mail [email or web address]

## 2019-09-23 NOTE — PROGRESS NOTES
Subjective:       Patient ID: Shirley Metz is a 86 y.o. White female who presents for follow up  evaluation of No chief complaint on file.    Hypertension   This is a chronic problem. The current episode started more than 1 year ago. The problem has been waxing and waning since onset. The problem is resistant. Associated symptoms include anxiety, malaise/fatigue and peripheral edema. Pertinent negatives include no chest pain, headaches, neck pain, palpitations or shortness of breath. There are no associated agents (passive smoking years ago ) to hypertension. Risk factors for coronary artery disease include dyslipidemia, obesity, post-menopausal state, sedentary lifestyle and smoking/tobacco exposure. Past treatments include angiotensin blockers, beta blockers, calcium channel blockers and diuretics. The current treatment provides moderate improvement. There are no compliance problems.  Hypertensive end-organ damage includes left ventricular hypertrophy. Identifiable causes of hypertension include sleep apnea.   Hematuria   Irritative symptoms do not include frequency or urgency. Pertinent negatives include no abdominal pain, chills, dysuria, fever, flank pain, nausea or vomiting.   Edema   Pertinent negatives include no abdominal pain, arthralgias, chest pain, chills, congestion, coughing, diaphoresis, fatigue, fever, headaches, joint swelling, nausea, neck pain, numbness, rash, vomiting or weakness.     Patient is an 85-year-old white female with long-standing history of hypertension which was adequately controlled in 2014 .  She has had issues of edema of the legs and also intolerance to different medications.  She has had severe weakness of her arms and legs for which she is now walking with a walker.  Apparently she has had severe side effects from cholesterol medications.  He has some symptoms of sleep apnea.        In 9/2014 she comes in for consultation for fluctuating blood pressure status post  3 visits to the emergency room.  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 Cardiology consulted. Patient treated with HCTZ, Losartan, Metoprolol, and PRN antihypertensives. EKG unremarkable. 2D ECHO with EF 60-65% with DD and pulmonary HTN. Troponin trended up. NM stress test negative on 05/15/17. BP improved. .        January 2018 Today seen for follow up after a gap of over 2 years.  Creatinine has been fluctuating between 1.2 and 1.3.  No recent UTI.  Records reviewed 2015.  Normal vitamin D level.  Recent emergency room visit was for shortness of breath and hypertension uncontrolled.  Patient has been followed in multiple specialties including primary care physician Dr. Terrell, due to multiple ALLERGIES/reactions from medications patient has been managed with multiple blood pressure medications without clear evidence of ALLERGY.  Patient has had history of carvedilol causing ALLERGIC reaction but when she was seen in the emergency room no evidence was documented ALLERGY or lip swelling.  Patient has been very difficult to control with blood pressure.  Patient has been followed and managed in cardiology division as well as rheumatology division.  Patient does have  diastolic dysfunction and pulmonary hypertension with evidence of hypertension uncontrolled. Corneal transplant both sides. Off HCTZ due to polyuria       September 23, 2019:  Patient was last seen by Dr. Beach on 1/31/18. She presents to renal clinic for follow-up today. Renal function has remained stable with creatinine at 1.3.   Patient states that she is currently taking Clonidine and Losartan for BP control only. She stopped taking HCTZ because of frequent urination. No major complaints at present.       Review of Systems   Constitutional: Positive for malaise/fatigue. Negative for activity change, appetite change, chills, diaphoresis, fatigue, fever and unexpected weight change.   HENT: Negative for congestion, dental problem, drooling, ear discharge, hearing loss, postnasal drip, rhinorrhea and voice change.    Eyes: Negative for discharge and visual disturbance.   Respiratory: Negative for apnea, cough, choking, chest tightness, shortness of breath, wheezing and stridor.    Cardiovascular: Negative for chest pain, palpitations and leg swelling.   Gastrointestinal: Negative for abdominal distention, abdominal pain, blood in stool, constipation, diarrhea, nausea, rectal pain and vomiting.   Endocrine: Negative for cold intolerance, heat intolerance, polydipsia and polyuria.   Genitourinary: Negative for decreased urine volume, difficulty urinating, dyspareunia, dysuria, enuresis, flank pain, frequency, genital sores, hematuria, pelvic pain, urgency and vaginal discharge.   Musculoskeletal: Negative for arthralgias, back pain, joint swelling, neck pain and neck stiffness.   Skin: Negative for color change and rash.   Allergic/Immunologic: Negative for food allergies and immunocompromised state.   Neurological: Negative for dizziness, tremors, seizures, syncope, weakness, light-headedness, numbness and headaches.   Hematological: Does not bruise/bleed easily.   Psychiatric/Behavioral: Negative for agitation,  behavioral problems, confusion, self-injury, sleep disturbance and suicidal ideas. The patient is not nervous/anxious and is not hyperactive.    All other systems reviewed and are negative.      Objective:       There were no vitals filed for this visit.    Physical Exam   Constitutional: She is oriented to person, place, and time. She appears well-developed and well-nourished.   HENT:   Head: Normocephalic and atraumatic.   Eyes: Pupils are equal, round, and reactive to light. Conjunctivae and EOM are normal.   Neck: Normal range of motion and full passive range of motion without pain. Neck supple. Carotid bruit is not present. No edema present. No thyroid mass and no thyromegaly present.   Cardiovascular: Normal rate, regular rhythm, S1 normal, S2 normal, normal heart sounds, intact distal pulses and normal pulses.  No extrasystoles are present. PMI is not displaced. Exam reveals no friction rub.   No murmur heard.  Pulmonary/Chest: Effort normal and breath sounds normal. No accessory muscle usage. No respiratory distress. She has no wheezes. She has no rales. She exhibits no tenderness.   Abdominal: Soft. Bowel sounds are normal. She exhibits no distension and no mass. There is no hepatosplenomegaly. There is no tenderness. There is no rebound and no CVA tenderness. No hernia.   Musculoskeletal: Normal range of motion. She exhibits no edema or tenderness.   Neurological: She is alert and oriented to person, place, and time. She has normal reflexes. No cranial nerve deficit or sensory deficit. She exhibits normal muscle tone. Coordination normal.   Skin: Skin is warm and dry. No bruising, no ecchymosis and no rash noted. No cyanosis or erythema. No pallor. Nails show no clubbing.   Psychiatric: She has a normal mood and affect. Her speech is normal and behavior is normal. Judgment and thought content normal.         Lab Results   Component Value Date    WBC 6.34 09/16/2019    HGB 11.0 (L) 09/16/2019    HCT 35.1  (L) 09/16/2019     (H) 09/16/2019     09/16/2019     Lab Results   Component Value Date    CREATININE 1.3 09/18/2019    BUN 28 (H) 09/18/2019     09/18/2019    K 4.0 09/18/2019     09/18/2019    CO2 26 09/18/2019       Assessment:       No diagnosis found.    Plan:           1.  Hypertension: She reports home SBP ranging from 130s to 170s. Currently on Losartan and clonidine. Will stop clonidine and start Labetalol 100 mg po bid instead. Patient will reports BP readings via [employer]. Patient not a candidate for digital BP monitoring since she does not have a smartphone. Will make adjustments as needed.     2.  Chronic kidney disease stage III: Stable with creatinine at 1.3. No proteinuria/hematuria. She will return to clinic in 4 months.         Sven Cortez MD

## 2019-09-24 ENCOUNTER — OFFICE VISIT (OUTPATIENT)
Dept: ORTHOPEDICS | Facility: CLINIC | Age: 84
End: 2019-09-24
Payer: MEDICARE

## 2019-09-24 ENCOUNTER — HOSPITAL ENCOUNTER (OUTPATIENT)
Dept: RADIOLOGY | Facility: HOSPITAL | Age: 84
Discharge: HOME | End: 2019-09-24
Attending: ORTHOPAEDIC SURGERY
Payer: MEDICARE

## 2019-09-24 VITALS
BODY MASS INDEX: 29.53 KG/M2 | WEIGHT: 173 LBS | RESPIRATION RATE: 18 BRPM | HEIGHT: 64 IN | SYSTOLIC BLOOD PRESSURE: 171 MMHG | DIASTOLIC BLOOD PRESSURE: 88 MMHG | HEART RATE: 80 BPM

## 2019-09-24 DIAGNOSIS — M25.512 LEFT SHOULDER PAIN, UNSPECIFIED CHRONICITY: ICD-10-CM

## 2019-09-24 DIAGNOSIS — M19.012 PRIMARY OSTEOARTHRITIS OF LEFT SHOULDER: Primary | ICD-10-CM

## 2019-09-24 DIAGNOSIS — Z96.612 STATUS POST REVERSE ARTHROPLASTY OF LEFT SHOULDER: ICD-10-CM

## 2019-09-24 PROCEDURE — 73030 X-RAY EXAM OF SHOULDER: CPT | Mod: 26,HCNC,LT, | Performed by: RADIOLOGY

## 2019-09-24 PROCEDURE — 73030 X-RAY EXAM OF SHOULDER: CPT | Mod: TC,HCNC,LT

## 2019-09-24 PROCEDURE — 1101F PT FALLS ASSESS-DOCD LE1/YR: CPT | Mod: HCNC,CPTII,S$GLB, | Performed by: ORTHOPAEDIC SURGERY

## 2019-09-24 PROCEDURE — 1101F PR PT FALLS ASSESS DOC 0-1 FALLS W/OUT INJ PAST YR: ICD-10-PCS | Mod: HCNC,CPTII,S$GLB, | Performed by: ORTHOPAEDIC SURGERY

## 2019-09-24 PROCEDURE — 99999 PR PBB SHADOW E&M-EST. PATIENT-LVL IV: ICD-10-PCS | Mod: PBBFAC,HCNC,, | Performed by: ORTHOPAEDIC SURGERY

## 2019-09-24 PROCEDURE — 99213 PR OFFICE/OUTPT VISIT, EST, LEVL III, 20-29 MIN: ICD-10-PCS | Mod: HCNC,S$GLB,, | Performed by: ORTHOPAEDIC SURGERY

## 2019-09-24 PROCEDURE — 99213 OFFICE O/P EST LOW 20 MIN: CPT | Mod: HCNC,S$GLB,, | Performed by: ORTHOPAEDIC SURGERY

## 2019-09-24 PROCEDURE — 73030 XR SHOULDER COMPLETE 2 OR MORE VIEWS LEFT: ICD-10-PCS | Mod: 26,HCNC,LT, | Performed by: RADIOLOGY

## 2019-09-24 PROCEDURE — 99999 PR PBB SHADOW E&M-EST. PATIENT-LVL IV: CPT | Mod: PBBFAC,HCNC,, | Performed by: ORTHOPAEDIC SURGERY

## 2019-09-24 NOTE — PROGRESS NOTES
"Subjective:     Patient ID: Shirley Metz is a 86 y.o. female.    Chief Complaint: Pain of the Left Shoulder    She is post op 13 months left reverse total shoulder arthroplasty. Pain rated 0/10. Having some numbness to anterolateral forearm, doesn't bother her though. Pain to shoulder is markedly improved. "no pain." She continues to do her shoulder exercises at home for both right and left shoulder she states.  Right shoulder doing better with the exercises, left shoulder doing better with the exercises.  Overall very happy that she had the surgery she states.    No fevers or chills no wound drainage      Shoulder Pain    The pain is present in the left shoulder. This is a chronic problem. The current episode started more than 1 year ago. The problem occurs rarely. The problem has been resolved. The quality of the pain is described as aching. The pain is at a severity of 0/10. Pertinent negatives include no fever or itching. The symptoms are aggravated by activity. She has tried OTC pain meds, OTC ointments, heat and brace/corset for the symptoms. The treatment provided moderate relief. Physical therapy was effective.      Past Medical History:   Diagnosis Date    Anemia 11/29/2018    Anxiety 11/28/2017    Arthritis     Back pain     Basal cell carcinoma 03/29/2018    left mid back    Colon polyps     reported per patient.     Fuchs' corneal dystrophy     General anesthetics causing adverse effect in therapeutic use     woke up during surgery and gagged on ET tube    Glaucoma     Glaucoma     Heart failure with preserved left ventricular function (HFpEF) 7/24/2018    Hyperlipidemia     Hypertension     Macular degeneration dry    Obesity     Squamous cell carcinoma 01/08/2019    left shoulder    Trouble in sleeping     Urinary tract infection      Past Surgical History:   Procedure Laterality Date    ADENOIDECTOMY  1938    BREAST BIOPSY Left     1997. benign    BREAST CYST EXCISION " Left 1997 approx    CARPAL TUNNEL RELEASE Right 2012 approx    CATARACT EXTRACTION Bilateral 1994    CHOLECYSTECTOMY  1975    CORNEAL TRANSPLANT Bilateral 08/2015 8/2015 - left; Right 4/2016    EYE SURGERY      Fuch's Corneal dystrophy - had 2nd operation with Dr. Quintanilla    EYE SURGERY      Cataract wIOL    left thumb Left 2011    removed cuboid for arthritis    REVERSE TOTAL SHOULDER ARTHROPLASTY Left 8/21/2018    Procedure: ARTHROPLASTY, SHOULDER, TOTAL, REVERSE;  Surgeon: Solomon Lujan MD;  Location: [facility] OR;  Service: Orthopedics;  Laterality: Left;  WITH BICEP TENODESIS    SKIN CANCER EXCISION Left 2017    BCC removal by Dr. Valadez    SLT- OS (aka TRABECULOPLASTY) Left 05/11/2011    repeat 3/14/12    TENOTOMY Left 8/21/2018    Procedure: TENOTOMY;  Surgeon: Solomon Lujan MD;  Location: [facility] OR;  Service: Orthopedics;  Laterality: Left;    TONSILLECTOMY  1938     Family History   Problem Relation Age of Onset    Heart disease Mother     Hypertension Mother     Cancer Father 88        sarcoma( ?Kaposi's ) on leg    Deep vein thrombosis Neg Hx     Ovarian cancer Neg Hx     Breast cancer Neg Hx     Kidney disease Neg Hx     Strabismus Neg Hx     Retinal detachment Neg Hx     Macular degeneration Neg Hx     Glaucoma Neg Hx     Blindness Neg Hx     Amblyopia Neg Hx     Eczema Neg Hx     Lupus Neg Hx     Melanoma Neg Hx     Psoriasis Neg Hx     Diabetes Neg Hx     Stroke Neg Hx     Mental retardation Neg Hx     Mental illness Neg Hx     Hyperlipidemia Neg Hx     COPD Neg Hx     Asthma Neg Hx     Depression Neg Hx     Alcohol abuse Neg Hx     Drug abuse Neg Hx      Social History     Socioeconomic History    Marital status:      Spouse name: Not on file    Number of children: Not on file    Years of education: Not on file    Highest education level: Not on file   Occupational History    Not on file   Social Needs    Financial resource strain: Not on file     Food insecurity:     Worry: Not on file     Inability: Not on file    Transportation needs:     Medical: Not on file     Non-medical: Not on file   Tobacco Use    Smoking status: Never Smoker    Smokeless tobacco: Never Used    Tobacco comment: history of passive smoking from her ex-   Substance and Sexual Activity    Alcohol use: Yes     Alcohol/week: 2.0 standard drinks     Types: 2 Standard drinks or equivalent per week     Comment: occasional     Drug use: No    Sexual activity: Not Currently     Partners: Male     Birth control/protection: Post-menopausal     Comment: discussed protection for STD's   Lifestyle    Physical activity:     Days per week: Not on file     Minutes per session: Not on file    Stress: Not on file   Relationships    Social connections:     Talks on phone: Not on file     Gets together: Not on file     Attends Oriental orthodox service: Not on file     Active member of club or organization: Not on file     Attends meetings of clubs or organizations: Not on file     Relationship status: Not on file   Other Topics Concern    Are you pregnant or think you may be? No    Breast-feeding No   Social History Narrative     x 2,  x 1. Retired artist - previously doing jewelry/wall pieces; ; lives in [facility]; has 3 sons - 52( lives in [place], 54, and 56;exercises minimally - limited due to back issues. Still drives. Does have a Living Will.     Medication List with Changes/Refills   Current Medications    ACETAMINOPHEN (TYLENOL) 500 MG TABLET    Take 500 mg by mouth daily as needed. Taking 1 to 3    ALPHA LIPOIC ACID ORAL    Take 200 mg by mouth.     ALPRAZOLAM (XANAX) 0.5 MG TABLET    TAKE 1/2 - 1 TABLET BY MOUTH NIGHTLY FOR SLEEP OR ANXIETY    B COMPLEX-VITAMIN C-FOLIC ACID 0.8 MG TAB    Take 1 tablet by mouth once daily.    BOSWELLIA LASHAWN EXTRACT (BOSWELLIA LASHAWN XT, BULK, MISC)    by Misc.(Non-Drug; Combo Route) route.    CALCIUM  CITRATE ORAL    Take by mouth.    CHOLECALCIFEROL, VITAMIN D3, (VITAMIN D3) 2,000 UNIT CAP    Take 1 capsule by mouth once daily.    CHROMIUM ORAL    Take 200 mg by mouth once daily.    CLONIDINE (CATAPRES) 0.1 MG TABLET    Take 1 tablet (0.1 mg total) by mouth 2 (two) times daily.    COENZYME Q10 200 MG CAPSULE    Take 400 mg by mouth once daily.     DIPHENHYDRAMINE-ACETAMINOPHEN (TYLENOL PM)  MG TAB    Take 1 tablet by mouth nightly as needed.     FISH OIL-OMEGA-3 FATTY ACIDS 300-1,000 MG CAPSULE    Take 2 g by mouth 2 (two) times daily.     HYDROCHLOROTHIAZIDE (MICROZIDE) 12.5 MG CAPSULE    Take 12.5 mg by mouth once daily.    LABETALOL (NORMODYNE) 100 MG TABLET    Take 1 tablet (100 mg total) by mouth 2 (two) times daily.    LOSARTAN (COZAAR) 50 MG TABLET    Take 1 tablet (50 mg total) by mouth 2 (two) times daily.    LOTEMAX 0.5 % DRPG        POLYETHYLENE GLYCOL 3350 (MIRALAX ORAL)    Take by mouth as needed.     PYRIDOXINE, VITAMIN B6, (B-6) 100 MG TAB    Take 100 mg by mouth once daily.     RHOPRESSA 0.02 % DROP        TRAVATAN Z 0.004 % OPHTHALMIC SOLUTION    PLACE 1 DROP INTO THE LEFT EYE EVERY EVENING.    TURMERIC ORAL    Take 500 mg by mouth 2 (two) times daily.    VITAMIN E 400 UNIT CAPSULE    Take 400 Units by mouth once daily.      Review of patient's allergies indicates:   Allergen Reactions    Claritin [loratadine] Other (See Comments)     Double vision/spots    Hydrocodone-acetaminophen      wheezing    Naproxen      wheezing    Verapamil Diarrhea    Vibramycin [doxycycline calcium] Other (See Comments)     Weakness nausea   sob    Amlodipine      Myalgias      Coreg [carvedilol] Swelling     lips    Fenofibrate      Causes muscle weakness    Hydralazine analogues      Muscle tremors/aches      Isosorbide     Lasix [furosemide]      myalgias    Moxifloxacin Other (See Comments)     Hives   muscle soreness    Timolol     Adhesive Rash     Clonidine patch - 2 mfg - contact  dermatitis    Allegra [fexofenadine] Other (See Comments)     Double vision/spots     Codeine Diarrhea and Other (See Comments)     Loss of balance     Erythromycin Other (See Comments)     Complete weakness/could not walk    Hydrocortisone (bulk) Other (See Comments)     Only suppository/ caused muscle weakness    Macrolide antibiotics Other (See Comments)     Complete weakness/could not walk    Patanol [olopatadine] Other (See Comments)     Muscle weakness    Prednisone Anxiety    Statins-hmg-coa reductase inhibitors Other (See Comments)     Muscle weakness    Xalatan [latanoprost] Other (See Comments)     Muscle weakness     Review of Systems   Constitution: Negative for fever.   HENT: Negative for sore throat.    Eyes: Negative for blurred vision.   Cardiovascular: Negative for dyspnea on exertion.   Respiratory: Negative for shortness of breath.    Hematologic/Lymphatic: Does not bruise/bleed easily.   Skin: Negative for itching.   Gastrointestinal: Negative for vomiting.   Genitourinary: Negative for dysuria.   Neurological: Negative for dizziness.   Psychiatric/Behavioral: The patient does not have insomnia.        Objective:   Body mass index is 29.7 kg/m².  Vitals:    09/24/19 1039   BP: (!) 171/88   Pulse: 80   Resp: 18           General    Nursing note and vitals reviewed.  Constitutional: She is oriented to person, place, and time. She appears well-developed. No distress.   HENT:   Head: Normocephalic and atraumatic.   Eyes: EOM are normal.   Cardiovascular: Normal rate.    Pulmonary/Chest: Effort normal. No stridor.   Neurological: She is alert and oriented to person, place, and time.   Psychiatric: She has a normal mood and affect. Her behavior is normal.         Right Shoulder Exam     Comments:  For flexion actively 155    Left Shoulder Exam     Tests & Signs   Rotator cuff painful arc/range: No significant.    Other   Sensation: normal     Comments:  Left shoulder incision clean dry intact  healing well no signs infection, sensation light touch axillary nerve distribution, active range of motion with forward flexion to 140, external rotation at the side is to 20, internal rotation is to L4 no crepitus    5/5 strength external rotation and internal rotation, also good strength with abduction and supraspinatus testing      Vascular Exam       Capillary Refill  Left Hand: normal capillary refill      Radiographs / Imaging : Results reviewed by me and interpreted by me, discussed with the patient and / or family   Left reverse total shoulder prosthesis in good position no signs of loosening or failure, no signs of scapular notching, no interval detrimental changes seen, reviewed and agree      Assessment:     Encounter Diagnoses   Name Primary?    Primary osteoarthritis of left shoulder Yes    Status post reverse arthroplasty of left shoulder         Plan:     Overall Ifrah is doing excellent, congratulated on her her on her rehabilitation    Can progress to activities as tolerated left shoulder, recommend avoid heavy lifting in general    Continue home exercise program    Follow-up in 12-24 months for new x-rays of the left shoulder for surveillance -  she relates that she is very happy with her outcome and definitely notices decreased pain now compared to prior to surgery and increased function and range of motion to the left shoulder.

## 2019-09-24 NOTE — PROGRESS NOTES
Subjective:     Patient ID: Shirley Metz is a 86 y.o. female.    Chief Complaint: No chief complaint on file.    She is post op 6-7  months left reverse total shoulder arthroplasty. Pain is continuing to improve.     Continue to improve with therapy, improving strength, range of motion and pain is continuing to improve.      Shoulder Pain    The pain is present in the left shoulder. This is a chronic problem. The current episode started more than 1 year ago. The problem occurs rarely. The problem has been gradually improving. The quality of the pain is described as aching. The pain is at a severity of 0/10. Pertinent negatives include no fever or itching. The symptoms are aggravated by activity. She has tried OTC pain meds, OTC ointments, heat and brace/corset for the symptoms. The treatment provided moderate relief. Physical therapy was effective.      Past Medical History:   Diagnosis Date    Anemia 11/29/2018    Anxiety 11/28/2017    Arthritis     Back pain     Basal cell carcinoma 03/29/2018    left mid back    Colon polyps     reported per patient.     Fuchs' corneal dystrophy     General anesthetics causing adverse effect in therapeutic use     woke up during surgery and gagged on ET tube    Glaucoma     Glaucoma     Heart failure with preserved left ventricular function (HFpEF) 7/24/2018    Hyperlipidemia     Hypertension     Macular degeneration dry    Obesity     Squamous cell carcinoma 01/08/2019    left shoulder    Trouble in sleeping     Urinary tract infection      Past Surgical History:   Procedure Laterality Date    ADENOIDECTOMY  1938    BREAST BIOPSY Left     1997. benign    BREAST CYST EXCISION Left 1997 approx    CARPAL TUNNEL RELEASE Right 2012 approx    CATARACT EXTRACTION Bilateral 1994    CHOLECYSTECTOMY  1975    CORNEAL TRANSPLANT Bilateral 08/2015 8/2015 - left; Right 4/2016    EYE SURGERY      Fuch's Corneal dystrophy - had 2nd operation with   [facility]    EYE SURGERY      Cataract wIOL    left thumb Left 2011    removed cuboid for arthritis    REVERSE TOTAL SHOULDER ARTHROPLASTY Left 8/21/2018    Procedure: ARTHROPLASTY, SHOULDER, TOTAL, REVERSE;  Surgeon: Solomon Lujan MD;  Location: [facility];  Service: Orthopedics;  Laterality: Left;  WITH BICEP TENODESIS    SKIN CANCER EXCISION Left 2017    BCC removal by Dr. Valadez    SLT- OS (aka TRABECULOPLASTY) Left 05/11/2011    repeat 3/14/12    TENOTOMY Left 8/21/2018    Procedure: TENOTOMY;  Surgeon: Solomon Lujan MD;  Location: [facility] OR;  Service: Orthopedics;  Laterality: Left;    TONSILLECTOMY  1938     Family History   Problem Relation Age of Onset    Heart disease Mother     Hypertension Mother     Cancer Father 88        sarcoma( ?Kaposi's ) on leg    Deep vein thrombosis Neg Hx     Ovarian cancer Neg Hx     Breast cancer Neg Hx     Kidney disease Neg Hx     Strabismus Neg Hx     Retinal detachment Neg Hx     Macular degeneration Neg Hx     Glaucoma Neg Hx     Blindness Neg Hx     Amblyopia Neg Hx     Eczema Neg Hx     Lupus Neg Hx     Melanoma Neg Hx     Psoriasis Neg Hx     Diabetes Neg Hx     Stroke Neg Hx     Mental retardation Neg Hx     Mental illness Neg Hx     Hyperlipidemia Neg Hx     COPD Neg Hx     Asthma Neg Hx     Depression Neg Hx     Alcohol abuse Neg Hx     Drug abuse Neg Hx      Social History     Socioeconomic History    Marital status:      Spouse name: Not on file    Number of children: Not on file    Years of education: Not on file    Highest education level: Not on file   Occupational History    Not on file   Social Needs    Financial resource strain: Not on file    Food insecurity:     Worry: Not on file     Inability: Not on file    Transportation needs:     Medical: Not on file     Non-medical: Not on file   Tobacco Use    Smoking status: Never Smoker    Smokeless tobacco: Never Used    Tobacco comment: history of passive  smoking from her ex-   Substance and Sexual Activity    Alcohol use: Yes     Alcohol/week: 2.0 standard drinks     Types: 2 Standard drinks or equivalent per week     Comment: occasional     Drug use: No    Sexual activity: Not Currently     Partners: Male     Birth control/protection: Post-menopausal     Comment: discussed protection for STD's   Lifestyle    Physical activity:     Days per week: Not on file     Minutes per session: Not on file    Stress: Not on file   Relationships    Social connections:     Talks on phone: Not on file     Gets together: Not on file     Attends Je[employer]'s witness service: Not on file     Active member of club or organization: Not on file     Attends meetings of clubs or organizations: Not on file     Relationship status: Not on file   Other Topics Concern    Are you pregnant or think you may be? No    Breast-feeding No   Social History Narrative     x 2,  x 1. Retired artist - previously doing jewelry/wall pieces; ; lives in [facility]; has 3 sons - 52( lives in [place], 54, and 56;exercises minimally - limited due to back issues. Still drives. Does have a Living Will.     Medication List with Changes/Refills   Current Medications    ACETAMINOPHEN (TYLENOL) 500 MG TABLET    Take 500 mg by mouth daily as needed. Taking 1 to 3    ALPHA LIPOIC ACID ORAL    Take 200 mg by mouth.     ALPRAZOLAM (XANAX) 0.5 MG TABLET    TAKE 1/2 - 1 TABLET BY MOUTH NIGHTLY FOR SLEEP OR ANXIETY    B COMPLEX-VITAMIN C-FOLIC ACID 0.8 MG TAB    Take 1 tablet by mouth once daily.    BOSWELLIA LASHAWN EXTRACT (BOSWELLIA LASHAWN XT, BULK, MISC)    by Misc.(Non-Drug; Combo Route) route.    CALCIUM CITRATE ORAL    Take by mouth.    CHOLECALCIFEROL, VITAMIN D3, (VITAMIN D3) 2,000 UNIT CAP    Take 1 capsule by mouth once daily.    CHROMIUM ORAL    Take 200 mg by mouth once daily.    CLONIDINE (CATAPRES) 0.1 MG TABLET    Take 1 tablet (0.1 mg total) by mouth 2 (two) times  daily.    COENZYME Q10 200 MG CAPSULE    Take 400 mg by mouth once daily.     DIPHENHYDRAMINE-ACETAMINOPHEN (TYLENOL PM)  MG TAB    Take 1 tablet by mouth nightly as needed.     FISH OIL-OMEGA-3 FATTY ACIDS 300-1,000 MG CAPSULE    Take 2 g by mouth 2 (two) times daily.     HYDROCHLOROTHIAZIDE (MICROZIDE) 12.5 MG CAPSULE    Take 12.5 mg by mouth once daily.    LABETALOL (NORMODYNE) 100 MG TABLET    Take 1 tablet (100 mg total) by mouth 2 (two) times daily.    LOSARTAN (COZAAR) 50 MG TABLET    Take 1 tablet (50 mg total) by mouth 2 (two) times daily.    LOTEMAX 0.5 % DRPG        POLYETHYLENE GLYCOL 3350 (MIRALAX ORAL)    Take by mouth as needed.     PYRIDOXINE, VITAMIN B6, (B-6) 100 MG TAB    Take 100 mg by mouth once daily.     RHOPRESSA 0.02 % DROP        TRAVATAN Z 0.004 % OPHTHALMIC SOLUTION    PLACE 1 DROP INTO THE LEFT EYE EVERY EVENING.    TURMERIC ORAL    Take 500 mg by mouth 2 (two) times daily.    VITAMIN E 400 UNIT CAPSULE    Take 400 Units by mouth once daily.      Review of patient's allergies indicates:   Allergen Reactions    Claritin [loratadine] Other (See Comments)     Double vision/spots    Hydrocodone-acetaminophen      wheezing    Naproxen      wheezing    Verapamil Diarrhea    Vibramycin [doxycycline calcium] Other (See Comments)     Weakness nausea   sob    Amlodipine      Myalgias      Coreg [carvedilol] Swelling     lips    Fenofibrate      Causes muscle weakness    Hydralazine analogues      Muscle tremors/aches      Isosorbide     Lasix [furosemide]      myalgias    Moxifloxacin Other (See Comments)     Hives   muscle soreness    Timolol     Adhesive Rash     Clonidine patch - 2 mfg - contact dermatitis    Allegra [fexofenadine] Other (See Comments)     Double vision/spots     Codeine Diarrhea and Other (See Comments)     Loss of balance     Erythromycin Other (See Comments)     Complete weakness/could not walk    Hydrocortisone (bulk) Other (See Comments)     Only  suppository/ caused muscle weakness    Macrolide antibiotics Other (See Comments)     Complete weakness/could not walk    Patanol [olopatadine] Other (See Comments)     Muscle weakness    Prednisone Anxiety    Statins-hmg-coa reductase inhibitors Other (See Comments)     Muscle weakness    Xalatan [latanoprost] Other (See Comments)     Muscle weakness     Review of Systems   Constitution: Negative for fever.   HENT: Negative for sore throat.    Eyes: Negative for blurred vision.   Cardiovascular: Negative for dyspnea on exertion.   Respiratory: Negative for shortness of breath.    Hematologic/Lymphatic: Does not bruise/bleed easily.   Skin: Negative for itching.   Gastrointestinal: Negative for vomiting.   Genitourinary: Negative for dysuria.   Neurological: Negative for dizziness.   Psychiatric/Behavioral: The patient does not have insomnia.        Objective:   There is no height or weight on file to calculate BMI.  There were no vitals filed for this visit.        General    Nursing note and vitals reviewed.  Constitutional: She is oriented to person, place, and time. She appears well-developed. No distress.   HENT:   Head: Normocephalic and atraumatic.   Eyes: EOM are normal.   Cardiovascular: Normal rate.    Pulmonary/Chest: Effort normal. No stridor.   Neurological: She is alert and oriented to person, place, and time.   Psychiatric: She has a normal mood and affect. Her behavior is normal.         Right Shoulder Exam     Comments:  For flexion actively 155    Left Shoulder Exam     Tests & Signs   Rotator cuff painful arc/range: No significant.    Other   Sensation: normal     Comments:  Left shoulder incision clean dry intact healing well no signs infection, sensation light touch axillary nerve distribution, active range of motion with forward flexion to 140, external rotation at the side is to 20, internal rotation is to L5 no crepitus    5/5 strength external rotation and internal rotation, also good  strength with abduction and supraspinatus testing      Vascular Exam       Capillary Refill  Left Hand: normal capillary refill      Radiographs / Imaging : Results reviewed by me and interpreted by me, discussed with the patient and / or family   Left reverse total shoulder prosthesis in good position no signs of loosening or failure, no signs of scapular notching, no interval detrimental changes seen      Assessment:     No diagnosis found.     Plan:     Overall Ifrah is doing excellent, congratulated on her her on her rehabilitation    Can progress to activities as tolerated left shoulder, recommend avoid heavy lifting in general    Continue home exercise program    Follow-up in 6 months for new x-rays of the left shoulder for surveillance -  she relates that she is very happy with her outcome and definitely notices decreased pain now compared to prior to surgery and increased function and range of motion to the left shoulder.

## 2019-09-26 ENCOUNTER — OFFICE VISIT (OUTPATIENT)
Dept: INTERNAL MEDICINE | Facility: CLINIC | Age: 84
End: 2019-09-26
Payer: MEDICARE

## 2019-09-26 ENCOUNTER — PATIENT MESSAGE (OUTPATIENT)
Dept: NEPHROLOGY | Facility: CLINIC | Age: 84
End: 2019-09-26

## 2019-09-26 VITALS
TEMPERATURE: 98 F | HEIGHT: 64 IN | WEIGHT: 171.5 LBS | DIASTOLIC BLOOD PRESSURE: 74 MMHG | BODY MASS INDEX: 29.28 KG/M2 | HEART RATE: 79 BPM | OXYGEN SATURATION: 97 % | SYSTOLIC BLOOD PRESSURE: 186 MMHG

## 2019-09-26 DIAGNOSIS — I16.0 ASYMPTOMATIC HYPERTENSIVE URGENCY: Primary | ICD-10-CM

## 2019-09-26 PROCEDURE — 99213 PR OFFICE/OUTPT VISIT, EST, LEVL III, 20-29 MIN: ICD-10-PCS | Mod: HCNC,S$GLB,, | Performed by: FAMILY MEDICINE

## 2019-09-26 PROCEDURE — 99499 UNLISTED E&M SERVICE: CPT | Mod: HCNC,S$GLB,, | Performed by: FAMILY MEDICINE

## 2019-09-26 PROCEDURE — 1101F PR PT FALLS ASSESS DOC 0-1 FALLS W/OUT INJ PAST YR: ICD-10-PCS | Mod: HCNC,CPTII,S$GLB, | Performed by: FAMILY MEDICINE

## 2019-09-26 PROCEDURE — 1101F PT FALLS ASSESS-DOCD LE1/YR: CPT | Mod: HCNC,CPTII,S$GLB, | Performed by: FAMILY MEDICINE

## 2019-09-26 PROCEDURE — 99999 PR PBB SHADOW E&M-EST. PATIENT-LVL III: CPT | Mod: PBBFAC,HCNC,, | Performed by: FAMILY MEDICINE

## 2019-09-26 PROCEDURE — 99999 PR PBB SHADOW E&M-EST. PATIENT-LVL III: ICD-10-PCS | Mod: PBBFAC,HCNC,, | Performed by: FAMILY MEDICINE

## 2019-09-26 PROCEDURE — 99499 RISK ADDL DX/OHS AUDIT: ICD-10-PCS | Mod: HCNC,S$GLB,, | Performed by: FAMILY MEDICINE

## 2019-09-26 PROCEDURE — 99213 OFFICE O/P EST LOW 20 MIN: CPT | Mod: HCNC,S$GLB,, | Performed by: FAMILY MEDICINE

## 2019-09-26 NOTE — PATIENT INSTRUCTIONS
Take 1/2 the labetolol 100 AM and PM.    OK to stay off the HCTZ 12.5 for now.    Consider getting back on 100 mg daily of losartan - consult with Dr Cortez.    OK to take clonidine for persistent BP =>180. Consider taking no more than 1/2 tablet for as needed use and try to avoid its use.    Schedule recheck with nephrology.

## 2019-09-26 NOTE — PROGRESS NOTES
Subjective:       Patient ID: Shirley Metz is a 86 y.o. female.    Chief Complaint: Hypertension    Here for previously scheduled visit.  She has seen Nephrology who made some changes.  She was placed on labetalol 100 b.i.d., states she was decreased to losartan 50 mg once a day from b.i.d., hydrochlorothiazide discontinued, clonidine discontinued.  Patient was supposed to be taking clonidine p.r.n. Only, but was taking it regularly.  She took her 1st dose of labetalol 9/25 at 5:00 a.m..  Her blood pressure was 136/63 and she was dizzy, nauseated, out of breath at 6:30 a.m..  At 2:00 p.m. her blood pressure was 136/66.  143/71 at 5:00 p.m. and she took the 2nd low bed all.  She was concerned because the next morning her blood pressure is 192/100 at 5:00 a.m..  Several other checks today have been in the 160s/70-80s and 182/78.    She has no hx asthma.    Review of Systems   Constitutional: Negative for fever.   Cardiovascular: Negative for chest pain.   Gastrointestinal: Positive for nausea.   Neurological: Positive for light-headedness.       Objective:      Physical Exam   Constitutional: She is oriented to person, place, and time. She appears well-developed.   HENT:   Head: Normocephalic and atraumatic.   Cardiovascular: Normal rate, regular rhythm and normal heart sounds.   Pulmonary/Chest: Effort normal and breath sounds normal.   Neurological: She is alert and oriented to person, place, and time.   Skin: Skin is warm and dry.   Psychiatric: She has a normal mood and affect. Her behavior is normal.         Assessment/Plan:     1. Asymptomatic hypertensive urgency     Pt got off several meds at same time as getting on the labetolol. Going to get her back on some of those meds - she is c/o feeling bad with first dose labetolol. Will have her try a lower dose. Consider increasing back up to 100 mg losartan. Take clonidine prn as directed.  Pt instruction: Take 1/2 the labetolol 100 AM and PM. OK to  stay off the HCTZ 12.5 for now.   Consider getting back on 100 mg daily of losartan - consult with Dr Cortez.  OK to take clonidine for persistent BP =>180. Consider taking no more than 1/2 tablet for as needed use and try to avoid its use.    Schedule recheck with nephrology pls.  She should see me as needed as well.   Advised pt that the low BPs may not have been related to effect of labetolol with just one initial dose. Advised her that sensations may be experienced with a new med that resolve with contd use over course of few days.

## 2019-09-30 ENCOUNTER — TELEPHONE (OUTPATIENT)
Dept: NEPHROLOGY | Facility: CLINIC | Age: 84
End: 2019-09-30

## 2019-09-30 ENCOUNTER — PES CALL (OUTPATIENT)
Dept: ADMINISTRATIVE | Facility: CLINIC | Age: 84
End: 2019-09-30

## 2019-09-30 NOTE — TELEPHONE ENCOUNTER
----- Message from Anika Downs sent at 9/30/2019  3:39 PM CDT -----  Contact: Pt   Pt is calling .Type:  Patient Returning Call    Who Called: Pt   Who Left Message for Patient: nurse   Does the patient know what this is regarding?:  Concerning taking dosages    Would the patient rather a call back or a response via [facility]? Call back   Best Call Back Number: [phone]         .Thank You  Anika Downs

## 2019-09-30 NOTE — TELEPHONE ENCOUNTER
Returned call to patient. She wanted to know if provider sent a message to her through the portal. Informed her not as of yet. She expressed understanding.

## 2019-10-03 NOTE — PROGRESS NOTES
[facility]      cc:   MD Izabel Ruiz M.D.    PREOPERATIVE DIAGNOSIS:  SMALL-BOWEL OBSTRUCTION.    POSTOPERATIVE DIAGNOSIS:  Small-bowel obstruction, internal hernia.    PROCEDURE:  1. DIAGNOSTIC LAPAROSCOPY.  2. EXPLORATORY LAPAROTOMY.  3. LYSIS OF ADHESIONS.    SURGEON:  HOPE GARZA MD.    ASSISTANT:  IZABEL NICOLAS M.D.    ANESTHESIA:  GENERAL ENDOTRACHEAL.    CONDITION:  The patient is stable.    EBL:  Minimal 10 mL.    COMPLICATIONS:  None.    SPECIMENS:  None.    The counts of needles, instruments, and sponges at the end of the operation  are correct.    SHORT HISTORY:  The patient is a 35-year-old lady  3 mom, 33 weeks pregnant who came to  the labor and delivery with abdominal pain, nausea, and vomiting for 1 day.  An extensive workup of the patient was done for her abdominal pain, nausea,  and vomiting including ultrasound, MRI of the abdomen as well as CT scan of  abdomen and pelvis with p.o. and IV contrast.  A diagnosis of small-bowel  obstruction high grade with a possible internal hernia or close loop was done  by clinical, laboratory, and imaging criteria.  An extensive long conversation  with the patient and her family was held regarding the patient's condition,  possible treatment options, and possible perioperative complications.  All the  questions were answered to patient and her family's satisfaction.  The patient  and the family verbalized understanding of the condition, possible treatment  options, and possible perioperative complications and agreed to proceed with  diagnostic laparoscopy, possible exploratory laparotomy, possible lysis of  adhesions, possible bowel resection.    DESCRIPTION OF PROCEDURE:  Operative Report - 1  Once the consent was obtained, the patient was brought to the operating room  and put on the operating table in supine position with slightly turned to the  left.  We started the operation after proper  Patient currently now resting quietly in bed. Patient has no complaints of anxiety. Will continue to monitor.   time-out and sterile prepping and  draping of the patient with 2 g of Ancef given preoperatively.  The patient  received OG tube as well as a Ugalde catheter.  We started the operation with a  midline incision about 10 cm above the umbilicus.  We used open method of  Selena to obtain access to the peritoneal cavity.  We put a 10 mm balloon  Selena trocar, inflated the balloon, and obtained pneumoperitoneum using CO2  at the highest level of 15 mmHg.  Through these ports, we put a 10-mm 30-  degree scope and our initial inspection showed the patient has extremely  dilated loops of small bowel and some hemorrhagic changes of the small bowel  in the right lower quadrant.  Because of the advanced pregnancy of the patient  and dilated loops, we decided that laparoscopic exploration is not going to be  safe for the patient.  For that reason, we decided to do an exploratory  laparotomy, about 18 cm in length, started from the incision for the  laparoscopy going down below the umbilicus about 5 cm below the umbilicus.  We  entered the abdominal cavity layer by layer obtaining excellent hemostasis.  Once we did so, we found that the patient has moderate amount of ascites in  her peritoneal cavity which was suctioned back.  We eviscerated the small  bowel out of the wound and what we found is that the patient has a very tight  and dense band that was obstructing the small bowel.  It was cut with the use  of a Bovie, and it was immediate release of the small bowel mesentery.  We ran  the small bowel from the ligament of Treitz down to the ileocecal valve, and  we did not find any other problems.  At that time, we decided to put the  bowels back into the abdomen and decided to close the abdomen using two 0  Vicryl looped stitches starting from above and from below, tying them to  themselves and in between them as well.  Once we did so, we used 3-0 Vicryl  for the subcutaneous tissue for better approximation of the wound.  The  skin  was approximated using skin staples.  Bacitracin and sterile dressing, Opsite  was put upon the incision site.  The patient was extubated in the operating  room, brought to recovery room in stable condition where she is going to be  monitored by the labor and delivery.    Preoperative and postoperative diagnosis; small bowel obstruction, internal  hernia.  Procedure; diagnostic laparoscopy, exploratory laparotomy, lysis of  adhesions.      Hope Tavarez MD          JACOBBREANA  MRN#:  [id number]  ACCT#:  [id number]  DATE OF SURGERY:  08/23/2017  ROOM:  [facility]  SVC:  O-B  DICTATED BY: Hope Tavarez MD  DD: 08/23/2017 DT: 08/23/2017 TD: 18:21 TT: 21:21 K#: [id number]    REPORT OF OPERATION                           Operative Report - 2           Electronically Signed On 08/24/2017 06:57  __________________________________________________   HOPE BUTTERFIELD

## 2019-10-06 ENCOUNTER — PATIENT MESSAGE (OUTPATIENT)
Dept: INTERNAL MEDICINE | Facility: CLINIC | Age: 84
End: 2019-10-06

## 2019-10-07 ENCOUNTER — OFFICE VISIT (OUTPATIENT)
Dept: CARDIOLOGY | Facility: CLINIC | Age: 84
End: 2019-10-07
Payer: MEDICARE

## 2019-10-07 ENCOUNTER — OFFICE VISIT (OUTPATIENT)
Dept: INTERNAL MEDICINE | Facility: CLINIC | Age: 84
End: 2019-10-07
Payer: MEDICARE

## 2019-10-07 VITALS
HEART RATE: 148 BPM | WEIGHT: 172.81 LBS | OXYGEN SATURATION: 95 % | SYSTOLIC BLOOD PRESSURE: 160 MMHG | DIASTOLIC BLOOD PRESSURE: 92 MMHG | BODY MASS INDEX: 29.67 KG/M2

## 2019-10-07 VITALS
WEIGHT: 171.44 LBS | HEIGHT: 64 IN | BODY MASS INDEX: 29.27 KG/M2 | OXYGEN SATURATION: 97 % | HEART RATE: 134 BPM | TEMPERATURE: 98 F | DIASTOLIC BLOOD PRESSURE: 103 MMHG | SYSTOLIC BLOOD PRESSURE: 152 MMHG

## 2019-10-07 DIAGNOSIS — I48.91 NEW ONSET A-FIB: ICD-10-CM

## 2019-10-07 DIAGNOSIS — I49.9 IRREGULAR HEART BEATS: ICD-10-CM

## 2019-10-07 DIAGNOSIS — I48.91 ATRIAL FIBRILLATION, UNSPECIFIED TYPE: Primary | ICD-10-CM

## 2019-10-07 DIAGNOSIS — I10 ESSENTIAL HYPERTENSION: Chronic | ICD-10-CM

## 2019-10-07 DIAGNOSIS — I48.91 ATRIAL FIBRILLATION WITH RAPID VENTRICULAR RESPONSE: Primary | ICD-10-CM

## 2019-10-07 DIAGNOSIS — M25.561 RIGHT MEDIAL KNEE PAIN: ICD-10-CM

## 2019-10-07 DIAGNOSIS — I47.9 TACHYCARDIA, PAROXYSMAL: ICD-10-CM

## 2019-10-07 DIAGNOSIS — M79.662 PAIN OF LEFT CALF: ICD-10-CM

## 2019-10-07 DIAGNOSIS — I50.30 HEART FAILURE WITH PRESERVED LEFT VENTRICULAR FUNCTION (HFPEF): ICD-10-CM

## 2019-10-07 DIAGNOSIS — I10 ESSENTIAL HYPERTENSION: Primary | Chronic | ICD-10-CM

## 2019-10-07 PROCEDURE — 99999 PR PBB SHADOW E&M-EST. PATIENT-LVL III: CPT | Mod: PBBFAC,HCNC,, | Performed by: INTERNAL MEDICINE

## 2019-10-07 PROCEDURE — 99999 PR PBB SHADOW E&M-EST. PATIENT-LVL III: ICD-10-PCS | Mod: PBBFAC,HCNC,, | Performed by: FAMILY MEDICINE

## 2019-10-07 PROCEDURE — 99999 PR PBB SHADOW E&M-EST. PATIENT-LVL III: CPT | Mod: PBBFAC,HCNC,, | Performed by: FAMILY MEDICINE

## 2019-10-07 PROCEDURE — 99215 PR OFFICE/OUTPT VISIT, EST, LEVL V, 40-54 MIN: ICD-10-PCS | Mod: HCNC,S$GLB,, | Performed by: INTERNAL MEDICINE

## 2019-10-07 PROCEDURE — 1101F PT FALLS ASSESS-DOCD LE1/YR: CPT | Mod: HCNC,CPTII,S$GLB, | Performed by: FAMILY MEDICINE

## 2019-10-07 PROCEDURE — 1101F PR PT FALLS ASSESS DOC 0-1 FALLS W/OUT INJ PAST YR: ICD-10-PCS | Mod: HCNC,CPTII,S$GLB, | Performed by: FAMILY MEDICINE

## 2019-10-07 PROCEDURE — 99999 PR PBB SHADOW E&M-EST. PATIENT-LVL III: ICD-10-PCS | Mod: PBBFAC,HCNC,, | Performed by: INTERNAL MEDICINE

## 2019-10-07 PROCEDURE — 99213 PR OFFICE/OUTPT VISIT, EST, LEVL III, 20-29 MIN: ICD-10-PCS | Mod: HCNC,S$GLB,, | Performed by: FAMILY MEDICINE

## 2019-10-07 PROCEDURE — 99213 OFFICE O/P EST LOW 20 MIN: CPT | Mod: HCNC,S$GLB,, | Performed by: FAMILY MEDICINE

## 2019-10-07 PROCEDURE — 1101F PR PT FALLS ASSESS DOC 0-1 FALLS W/OUT INJ PAST YR: ICD-10-PCS | Mod: HCNC,CPTII,S$GLB, | Performed by: INTERNAL MEDICINE

## 2019-10-07 PROCEDURE — 1101F PT FALLS ASSESS-DOCD LE1/YR: CPT | Mod: HCNC,CPTII,S$GLB, | Performed by: INTERNAL MEDICINE

## 2019-10-07 PROCEDURE — 93005 EKG 12-LEAD: ICD-10-PCS | Mod: HCNC,S$GLB,, | Performed by: FAMILY MEDICINE

## 2019-10-07 PROCEDURE — 93005 ELECTROCARDIOGRAM TRACING: CPT | Mod: HCNC,S$GLB,, | Performed by: FAMILY MEDICINE

## 2019-10-07 PROCEDURE — 93010 ELECTROCARDIOGRAM REPORT: CPT | Mod: HCNC,S$GLB,, | Performed by: NUCLEAR MEDICINE

## 2019-10-07 PROCEDURE — 93010 EKG 12-LEAD: ICD-10-PCS | Mod: HCNC,S$GLB,, | Performed by: NUCLEAR MEDICINE

## 2019-10-07 PROCEDURE — 99215 OFFICE O/P EST HI 40 MIN: CPT | Mod: HCNC,S$GLB,, | Performed by: INTERNAL MEDICINE

## 2019-10-07 RX ORDER — METOPROLOL TARTRATE 50 MG/1
100 TABLET ORAL 2 TIMES DAILY
Qty: 60 TABLET | Refills: 11 | Status: SHIPPED | OUTPATIENT
Start: 2019-10-07 | End: 2019-10-08 | Stop reason: SDUPTHER

## 2019-10-07 NOTE — PROGRESS NOTES
Subjective:   Patient ID:  Shirley Metz is a 86 y.o. female who presents for follow up of Follow-up      85 yo female. Came in for afib. F/u at cardiology service  PMH HTN, HLD chfpEF and CKD  C/o recent weakness,  stomach upset, worse after added Labetalol in   Has fast pulse. Some dizziness, and Monet.  No chest pain, and faint  C/o muscle cramp. Held HCTZ. Only losartan 50 mg bid and labetolol  Walker depedent chronically. No fall  Today, ekg showed afib with RVR.  bpm.    echo showed EF 60%. LVH   Nulcear stress test no ischemia      Past Medical History:   Diagnosis Date    Anemia 11/29/2018    Anxiety 11/28/2017    Arthritis     Atrial fibrillation with RVR 10/9/2019    Back pain     Basal cell carcinoma 03/29/2018    left mid back    Colon polyps     reported per patient.     Fuchs' corneal dystrophy     General anesthetics causing adverse effect in therapeutic use     woke up during surgery and gagged on ET tube    Glaucoma     Glaucoma     Heart failure with preserved left ventricular function (HFpEF) 7/24/2018    Hyperlipidemia     Hypertension     Macular degeneration dry    Obesity     Squamous cell carcinoma 01/08/2019    left shoulder    Trouble in sleeping     Urinary tract infection        Past Surgical History:   Procedure Laterality Date    ADENOIDECTOMY  1938    BREAST BIOPSY Left     1997. benign    BREAST CYST EXCISION Left 1997 approx    CARPAL TUNNEL RELEASE Right 2012 approx    CATARACT EXTRACTION Bilateral 1994    CHOLECYSTECTOMY  1975    CORNEAL TRANSPLANT Bilateral 08/2015 8/2015 - left; Right 4/2016    EYE SURGERY      Fuch's Corneal dystrophy - had 2nd operation with Dr. Quintanilla    EYE SURGERY      Cataract wIOL    left thumb Left 2011    removed cuboid for arthritis    REVERSE TOTAL SHOULDER ARTHROPLASTY Left 8/21/2018    Procedure: ARTHROPLASTY, SHOULDER, TOTAL, REVERSE;  Surgeon: Solomon Lujan MD;  Location:  [facility] OR;  Service: Orthopedics;  Laterality: Left;  WITH BICEP TENODESIS    SKIN CANCER EXCISION Left 2017    BCC removal by Dr. Valadez    SLT- OS (aka TRABECULOPLASTY) Left 05/11/2011    repeat 3/14/12    TENOTOMY Left 8/21/2018    Procedure: TENOTOMY;  Surgeon: Solomon Lujan MD;  Location: [facility] OR;  Service: Orthopedics;  Laterality: Left;    TONSILLECTOMY  1938       Social History     Tobacco Use    Smoking status: Never Smoker    Smokeless tobacco: Never Used    Tobacco comment: history of passive smoking from her ex-   Substance Use Topics    Alcohol use: Yes     Alcohol/week: 2.0 standard drinks     Types: 2 Standard drinks or equivalent per week     Comment: occasional     Drug use: No       Family History   Problem Relation Age of Onset    Heart disease Mother     Hypertension Mother     Cancer Father 88        sarcoma( ?Kaposi's ) on leg    Deep vein thrombosis Neg Hx     Ovarian cancer Neg Hx     Breast cancer Neg Hx     Kidney disease Neg Hx     Strabismus Neg Hx     Retinal detachment Neg Hx     Macular degeneration Neg Hx     Glaucoma Neg Hx     Blindness Neg Hx     Amblyopia Neg Hx     Eczema Neg Hx     Lupus Neg Hx     Melanoma Neg Hx     Psoriasis Neg Hx     Diabetes Neg Hx     Stroke Neg Hx     Mental retardation Neg Hx     Mental illness Neg Hx     Hyperlipidemia Neg Hx     COPD Neg Hx     Asthma Neg Hx     Depression Neg Hx     Alcohol abuse Neg Hx     Drug abuse Neg Hx          Review of Systems   Constitution: Positive for malaise/fatigue. Negative for decreased appetite, diaphoresis, fever and night sweats.   HENT: Negative for nosebleeds.    Eyes: Negative for blurred vision and double vision.   Cardiovascular: Positive for dyspnea on exertion and palpitations. Negative for chest pain, claudication, irregular heartbeat, leg swelling, near-syncope, orthopnea, paroxysmal nocturnal dyspnea and syncope.   Respiratory: Negative for cough, shortness of  breath, sleep disturbances due to breathing, snoring, sputum production and wheezing.    Endocrine: Negative for cold intolerance and polyuria.   Hematologic/Lymphatic: Does not bruise/bleed easily.   Skin: Negative for rash.   Musculoskeletal: Negative for back pain, falls, joint pain, joint swelling and neck pain.   Gastrointestinal: Positive for abdominal pain. Negative for heartburn, nausea and vomiting.   Genitourinary: Negative for dysuria, frequency and hematuria.   Neurological: Negative for difficulty with concentration, dizziness, focal weakness, headaches, light-headedness, numbness, seizures and weakness.   Psychiatric/Behavioral: Negative for depression, memory loss and substance abuse. The patient does not have insomnia.    Allergic/Immunologic: Negative for HIV exposure and hives.       Objective:   Physical Exam   Constitutional: She is oriented to person, place, and time. She appears well-nourished.   HENT:   Head: Normocephalic.   Eyes: Pupils are equal, round, and reactive to light.   Neck: Normal carotid pulses and no JVD present. Carotid bruit is not present. No thyromegaly present.   Cardiovascular: Normal heart sounds and normal pulses. An irregularly irregular rhythm present.  No extrasystoles are present. Tachycardia present. PMI is not displaced. Exam reveals no gallop and no S3.   No murmur heard.  Pulmonary/Chest: Breath sounds normal. No stridor. No respiratory distress.   Abdominal: Soft. Bowel sounds are normal. There is no tenderness. There is no rebound.   Musculoskeletal: Normal range of motion.   Neurological: She is alert and oriented to person, place, and time.   Skin: Skin is intact. No rash noted.   Psychiatric: Her behavior is normal.       Lab Results   Component Value Date    CHOL 233 (H) 08/01/2019    CHOL 180 03/16/2018    CHOL 181 02/19/2018     Lab Results   Component Value Date    HDL 49 08/01/2019    HDL 43 03/16/2018    HDL 45 02/19/2018     Lab Results   Component  Value Date    LDLCALC 155.0 08/01/2019    LDLCALC 117.6 03/16/2018    LDLCALC 118.2 02/19/2018     Lab Results   Component Value Date    TRIG 145 08/01/2019    TRIG 97 03/16/2018    TRIG 89 02/19/2018     Lab Results   Component Value Date    CHOLHDL 21.0 08/01/2019    CHOLHDL 23.9 03/16/2018    CHOLHDL 24.9 02/19/2018       Chemistry        Component Value Date/Time     10/11/2019 0410    K 4.2 10/11/2019 0410     10/11/2019 0410    CO2 24 10/11/2019 0410    BUN 46 (H) 10/11/2019 0410    CREATININE 1.6 (H) 10/11/2019 0410     (H) 10/11/2019 0410        Component Value Date/Time    CALCIUM 9.6 10/11/2019 0410    ALKPHOS 76 10/09/2019 0203    AST 42 (H) 10/09/2019 0203    ALT 48 (H) 10/09/2019 0203    BILITOT 0.5 10/09/2019 0203    ESTGFRAFRICA 33 (A) 10/11/2019 0410    EGFRNONAA 29 (A) 10/11/2019 0410          Lab Results   Component Value Date    HGBA1C 6.0 (H) 08/01/2019     Lab Results   Component Value Date    TSH 0.849 10/09/2019     Lab Results   Component Value Date    INR 1.0 01/25/2018    INR 1.0 01/01/2017    INR 1.0 02/09/2016     Lab Results   Component Value Date    WBC 8.57 10/11/2019    HGB 10.4 (L) 10/11/2019    HCT 32.6 (L) 10/11/2019    MCV 99 (H) 10/11/2019     10/11/2019     BMP  Sodium   Date Value Ref Range Status   10/11/2019 139 136 - 145 mmol/L Final     Potassium   Date Value Ref Range Status   10/11/2019 4.2 3.5 - 5.1 mmol/L Final     Chloride   Date Value Ref Range Status   10/11/2019 104 95 - 110 mmol/L Final     CO2   Date Value Ref Range Status   10/11/2019 24 23 - 29 mmol/L Final     BUN, Bld   Date Value Ref Range Status   10/11/2019 46 (H) 8 - 23 mg/dL Final     Creatinine   Date Value Ref Range Status   10/11/2019 1.6 (H) 0.5 - 1.4 mg/dL Final     Calcium   Date Value Ref Range Status   10/11/2019 9.6 8.7 - 10.5 mg/dL Final     Anion Gap   Date Value Ref Range Status   10/11/2019 11 8 - 16 mmol/L Final     eGFR if    Date Value Ref Range  Status   10/11/2019 33 (A) >60 mL/min/1.73 m^2 Final     eGFR if non    Date Value Ref Range Status   10/11/2019 29 (A) >60 mL/min/1.73 m^2 Final     Comment:     Calculation used to obtain the estimated glomerular filtration  rate (eGFR) is the CKD-EPI equation.        BNP  @LABRCNTIP(BNP,BNPTRIAGEBLO)@  @LABRCNTIP(troponini)@  CrCl cannot be calculated (Unknown ideal weight.).  No results found in the last 24 hours.  No results found in the last 24 hours.  No results found in the last 24 hours.    Assessment:      1. Atrial fibrillation, unspecified type    2. Heart failure with preserved left ventricular function (HFpEF)    3. Essential hypertension    4. New onset a-fib      New Dx of afib with RVR. Symptomatic. Pt is reluctant to go to ER now.  CHFpEF FC III  BP high    Plan:   Increase Metoprolol to 100 mg bid  Continue Cardizem 120 mg daily  Add Eliquis 2.5 mg bid For systemic thromboembolism prophylaxis.  Discussed the benefits and risks of NOAC.   Advise pt to go to ER if the symptoms worse  F/u in 1 week

## 2019-10-07 NOTE — PROGRESS NOTES
Subjective:       Patient ID: Shirley Metz is a 86 y.o. female.    Chief Complaint: Hypertension    See patient's e-mail note from earlier today.  She has been getting elevated heart rates over the last week.  Her diastolic blood pressures have also been elevated in 90s or low 100s.  Variable reading some within normal range at 124/86 HR 90.  Heart rate running variably to 120s-130s on her blood pressure checks.  Occasional low blood pressure:  Yesterday 94/81 at a heart rate of 136.  States she is very tired and legs feel weak.  She needed help getting back to her room after eating last night.  Currently taking losartan 50 mg b.i.d. and labetalol 50 mg b.i.d..  She was previously on clonidine taking 1/2 in a.m. and 1 tab in pm.  Labs change per Nephrology visit and then further adjusted by me 09/26/2019 visit as she was not tolerating 100 mg labetalol b.i.d..    Also complaining of right calf spasms using a topical menthol salicylate product on the calf.  Complaining of soreness there running 6 to 7/10 most the time.  The topical medication relieves it and then it returns.  She is also having a sore right medial knee just tender to touch.  BP Readings from Last 3 Encounters:  10/07/19 : (!) 152/103 - my repeat 155/96, her meter 139/89 with   09/26/19 : (!) 186/74  09/24/19 : (!) 171/88    Lab Results       Component                Value               Date                       WBC                      6.34                09/16/2019                 HGB                      11.0 (L)            09/16/2019                 HCT                      35.1 (L)            09/16/2019                 PLT                      206                 09/16/2019                 CHOL                     233 (H)             08/01/2019                 TRIG                     145                 08/01/2019                 HDL                      49                  08/01/2019                 LDLCALC                   155.0               08/01/2019                 ALT                      14                  08/01/2019                 AST                      21                  08/01/2019                 NA                       139                 09/18/2019                 K                        4.0                 09/18/2019                 CL                       103                 09/18/2019                 CALCIUM                  10.0                09/18/2019                 CREATININE               1.3                 09/18/2019                 BUN                      28 (H)              09/18/2019                 CO2                      26                  09/18/2019                 TSH                      1.140               09/18/2019                 INR                      1.0                 01/25/2018                 GLU                      108                 09/18/2019                 ESTGFRAFRICA             42.9 (A)            09/18/2019                 EGFRNONAA                37.2 (A)            09/18/2019                 HGBA1C                   6.0 (H)             08/01/2019            Has had recent TSH within normal range checked just 3 weeks ago.      Review of Systems   Constitutional: Positive for fatigue.   Cardiovascular: Positive for palpitations. Negative for chest pain and leg swelling.   Musculoskeletal: Positive for arthralgias and myalgias.       Objective:      Physical Exam   Constitutional: She is oriented to person, place, and time. She appears well-developed.   HENT:   Head: Normocephalic and atraumatic.   Cardiovascular: Normal heart sounds.   Heart rate running to 132 and irregular on exam.   Pulmonary/Chest: Effort normal and breath sounds normal. No respiratory distress. She has no wheezes.   Musculoskeletal:   Calves without swelling or tenderness generally to palpation.  There is a specific point to her right medial calf which is tender to palpation.  Tenderness to the  medial right knee generally.   Neurological: She is alert and oriented to person, place, and time.   Skin: Skin is warm and dry.   Psychiatric: She has a normal mood and affect. Her behavior is normal.         Assessment/Plan:     1. Atrial fibrillation with rapid ventricular response     2. Irregular heart beats  EKG 12-lead   3. Tachycardia, paroxysmal     4. Right medial knee pain     5. Pain of left calf     new onset a fib with RVR. Denies lightheaded or SOB. She is fatigued.  Sees Dr Ramirez - emily with him or another provider in cards for tomorrow.  rec add the second half of the labetolol 100 tonight. (she has been taking 1/2 100 mg BID)  No clonidine as BP not elevated.  Addendum: she will see cards today.

## 2019-10-08 ENCOUNTER — NURSE TRIAGE (OUTPATIENT)
Dept: ADMINISTRATIVE | Facility: CLINIC | Age: 84
End: 2019-10-08

## 2019-10-09 ENCOUNTER — HOSPITAL ENCOUNTER (INPATIENT)
Facility: HOSPITAL | Age: 84
LOS: 2 days | Discharge: HOME | DRG: 309 | End: 2019-10-11
Attending: EMERGENCY MEDICINE | Admitting: INTERNAL MEDICINE
Payer: MEDICARE

## 2019-10-09 DIAGNOSIS — I48.91 ATRIAL FIBRILLATION WITH RVR: ICD-10-CM

## 2019-10-09 DIAGNOSIS — I48.91 NEW ONSET ATRIAL FIBRILLATION: ICD-10-CM

## 2019-10-09 DIAGNOSIS — R07.9 CHEST PAIN: ICD-10-CM

## 2019-10-09 DIAGNOSIS — I48.91 NEW ONSET A-FIB: Primary | ICD-10-CM

## 2019-10-09 DIAGNOSIS — I48.91 ATRIAL FIBRILLATION: ICD-10-CM

## 2019-10-09 LAB
ALBUMIN SERPL BCP-MCNC: 3.9 G/DL (ref 3.5–5.2)
ALP SERPL-CCNC: 76 U/L (ref 55–135)
ALT SERPL W/O P-5'-P-CCNC: 48 U/L (ref 10–44)
ANION GAP SERPL CALC-SCNC: 15 MMOL/L (ref 8–16)
AST SERPL-CCNC: 42 U/L (ref 10–40)
BASOPHILS # BLD AUTO: 0.07 K/UL (ref 0–0.2)
BASOPHILS NFR BLD: 0.7 % (ref 0–1.9)
BILIRUB SERPL-MCNC: 0.5 MG/DL (ref 0.1–1)
BNP SERPL-MCNC: 491 PG/ML (ref 0–99)
BUN SERPL-MCNC: 37 MG/DL (ref 8–23)
CALCIUM SERPL-MCNC: 10.4 MG/DL (ref 8.7–10.5)
CHLORIDE SERPL-SCNC: 105 MMOL/L (ref 95–110)
CO2 SERPL-SCNC: 22 MMOL/L (ref 23–29)
CREAT SERPL-MCNC: 1.4 MG/DL (ref 0.5–1.4)
DIASTOLIC DYSFUNCTION: NO
DIFFERENTIAL METHOD: ABNORMAL
EOSINOPHIL # BLD AUTO: 0.2 K/UL (ref 0–0.5)
EOSINOPHIL NFR BLD: 2.2 % (ref 0–8)
ERYTHROCYTE [DISTWIDTH] IN BLOOD BY AUTOMATED COUNT: 13.6 % (ref 11.5–14.5)
EST. GFR  (AFRICAN AMERICAN): 39 ML/MIN/1.73 M^2
EST. GFR  (NON AFRICAN AMERICAN): 34 ML/MIN/1.73 M^2
ESTIMATED PA SYSTOLIC PRESSURE: 40.04
GLOBAL PERICARDIAL EFFUSION: ABNORMAL
GLUCOSE SERPL-MCNC: 120 MG/DL (ref 70–110)
HCT VFR BLD AUTO: 37.1 % (ref 37–48.5)
HGB BLD-MCNC: 12.1 G/DL (ref 12–16)
IMM GRANULOCYTES # BLD AUTO: 0.03 K/UL (ref 0–0.04)
IMM GRANULOCYTES NFR BLD AUTO: 0.3 % (ref 0–0.5)
LYMPHOCYTES # BLD AUTO: 2.2 K/UL (ref 1–4.8)
LYMPHOCYTES NFR BLD: 21.2 % (ref 18–48)
MAGNESIUM SERPL-MCNC: 2.1 MG/DL (ref 1.6–2.6)
MCH RBC QN AUTO: 31.8 PG (ref 27–31)
MCHC RBC AUTO-ENTMCNC: 32.6 G/DL (ref 32–36)
MCV RBC AUTO: 97 FL (ref 82–98)
MITRAL VALVE REGURGITATION: ABNORMAL
MONOCYTES # BLD AUTO: 0.9 K/UL (ref 0.3–1)
MONOCYTES NFR BLD: 9.2 % (ref 4–15)
NEUTROPHILS # BLD AUTO: 6.7 K/UL (ref 1.8–7.7)
NEUTROPHILS NFR BLD: 66.4 % (ref 38–73)
NRBC BLD-RTO: 0 /100 WBC
PLATELET # BLD AUTO: 255 K/UL (ref 150–350)
PMV BLD AUTO: 10.1 FL (ref 9.2–12.9)
POTASSIUM SERPL-SCNC: 4.3 MMOL/L (ref 3.5–5.1)
PROT SERPL-MCNC: 7.4 G/DL (ref 6–8.4)
RBC # BLD AUTO: 3.81 M/UL (ref 4–5.4)
RETIRED EF AND QEF - SEE NOTES: 55 (ref 55–65)
SODIUM SERPL-SCNC: 142 MMOL/L (ref 136–145)
TRICUSPID VALVE REGURGITATION: ABNORMAL
TROPONIN I SERPL DL<=0.01 NG/ML-MCNC: 0.03 NG/ML (ref 0–0.03)
TROPONIN I SERPL DL<=0.01 NG/ML-MCNC: 0.03 NG/ML (ref 0–0.03)
TROPONIN I SERPL DL<=0.01 NG/ML-MCNC: 0.04 NG/ML (ref 0–0.03)
TSH SERPL DL<=0.005 MIU/L-ACNC: 0.85 UIU/ML (ref 0.4–4)
WBC # BLD AUTO: 10.14 K/UL (ref 3.9–12.7)

## 2019-10-09 PROCEDURE — 83735 ASSAY OF MAGNESIUM: CPT | Mod: HCNC

## 2019-10-09 PROCEDURE — 99223 1ST HOSP IP/OBS HIGH 75: CPT | Mod: HCNC,,, | Performed by: INTERNAL MEDICINE

## 2019-10-09 PROCEDURE — 93306 TTE W/DOPPLER COMPLETE: CPT | Mod: 26,HCNC,, | Performed by: INTERNAL MEDICINE

## 2019-10-09 PROCEDURE — 93010 ELECTROCARDIOGRAM REPORT: CPT | Mod: HCNC,,, | Performed by: INTERNAL MEDICINE

## 2019-10-09 PROCEDURE — 21400001 HC TELEMETRY ROOM: Mod: HCNC

## 2019-10-09 PROCEDURE — 25000003 PHARM REV CODE 250: Mod: HCNC | Performed by: EMERGENCY MEDICINE

## 2019-10-09 PROCEDURE — 84484 ASSAY OF TROPONIN QUANT: CPT | Mod: HCNC

## 2019-10-09 PROCEDURE — 99223 PR INITIAL HOSPITAL CARE,LEVL III: ICD-10-PCS | Mod: HCNC,,, | Performed by: INTERNAL MEDICINE

## 2019-10-09 PROCEDURE — 25000003 PHARM REV CODE 250: Mod: HCNC

## 2019-10-09 PROCEDURE — 84484 ASSAY OF TROPONIN QUANT: CPT | Mod: 91,HCNC

## 2019-10-09 PROCEDURE — 80053 COMPREHEN METABOLIC PANEL: CPT | Mod: HCNC

## 2019-10-09 PROCEDURE — 93005 ELECTROCARDIOGRAM TRACING: CPT | Mod: HCNC

## 2019-10-09 PROCEDURE — 96374 THER/PROPH/DIAG INJ IV PUSH: CPT | Mod: HCNC

## 2019-10-09 PROCEDURE — 36415 COLL VENOUS BLD VENIPUNCTURE: CPT | Mod: HCNC

## 2019-10-09 PROCEDURE — 25000003 PHARM REV CODE 250: Mod: HCNC | Performed by: INTERNAL MEDICINE

## 2019-10-09 PROCEDURE — 63600175 PHARM REV CODE 636 W HCPCS: Mod: HCNC | Performed by: NURSE PRACTITIONER

## 2019-10-09 PROCEDURE — 93306 2D ECHO WITH COLOR FLOW DOPPLER: ICD-10-PCS | Mod: 26,HCNC,, | Performed by: INTERNAL MEDICINE

## 2019-10-09 PROCEDURE — 93010 EKG 12-LEAD: ICD-10-PCS | Mod: HCNC,,, | Performed by: INTERNAL MEDICINE

## 2019-10-09 PROCEDURE — 83880 ASSAY OF NATRIURETIC PEPTIDE: CPT | Mod: HCNC

## 2019-10-09 PROCEDURE — 93306 TTE W/DOPPLER COMPLETE: CPT | Mod: HCNC

## 2019-10-09 PROCEDURE — 85025 COMPLETE CBC W/AUTO DIFF WBC: CPT | Mod: HCNC

## 2019-10-09 PROCEDURE — 99285 EMERGENCY DEPT VISIT HI MDM: CPT | Mod: 25,HCNC

## 2019-10-09 PROCEDURE — 84443 ASSAY THYROID STIM HORMONE: CPT | Mod: HCNC

## 2019-10-09 RX ORDER — DILTIAZEM HCL/D5W 125 MG/125
15 PLASTIC BAG, INJECTION (ML) INTRAVENOUS CONTINUOUS
Status: DISCONTINUED | OUTPATIENT
Start: 2019-10-09 | End: 2019-10-09

## 2019-10-09 RX ORDER — ALPRAZOLAM 0.5 MG/1
0.5 TABLET ORAL 2 TIMES DAILY PRN
Status: DISCONTINUED | OUTPATIENT
Start: 2019-10-09 | End: 2019-10-11 | Stop reason: HOSPADM

## 2019-10-09 RX ORDER — SODIUM CHLORIDE 0.9 % (FLUSH) 0.9 %
10 SYRINGE (ML) INJECTION
Status: DISCONTINUED | OUTPATIENT
Start: 2019-10-09 | End: 2019-10-11 | Stop reason: HOSPADM

## 2019-10-09 RX ORDER — POLYETHYLENE GLYCOL 3350 17 G/17G
17 POWDER, FOR SOLUTION ORAL DAILY
Status: DISCONTINUED | OUTPATIENT
Start: 2019-10-10 | End: 2019-10-10

## 2019-10-09 RX ORDER — LANOLIN ALCOHOL/MO/W.PET/CERES
100 CREAM (GRAM) TOPICAL DAILY
Status: DISCONTINUED | OUTPATIENT
Start: 2019-10-10 | End: 2019-10-11 | Stop reason: HOSPADM

## 2019-10-09 RX ORDER — VITAMIN E 268 MG
400 CAPSULE ORAL DAILY
Status: DISCONTINUED | OUTPATIENT
Start: 2019-10-10 | End: 2019-10-11 | Stop reason: HOSPADM

## 2019-10-09 RX ORDER — HYDROCHLOROTHIAZIDE 12.5 MG/1
12.5 TABLET ORAL DAILY
Status: DISCONTINUED | OUTPATIENT
Start: 2019-10-10 | End: 2019-10-10

## 2019-10-09 RX ORDER — ACETAMINOPHEN 325 MG/1
650 TABLET ORAL EVERY 4 HOURS PRN
Status: DISCONTINUED | OUTPATIENT
Start: 2019-10-09 | End: 2019-10-11 | Stop reason: HOSPADM

## 2019-10-09 RX ORDER — ENOXAPARIN SODIUM 100 MG/ML
1 INJECTION SUBCUTANEOUS
Status: DISCONTINUED | OUTPATIENT
Start: 2019-10-09 | End: 2019-10-09

## 2019-10-09 RX ORDER — METOPROLOL TARTRATE 50 MG/1
100 TABLET ORAL 2 TIMES DAILY
Status: DISCONTINUED | OUTPATIENT
Start: 2019-10-09 | End: 2019-10-11 | Stop reason: HOSPADM

## 2019-10-09 RX ORDER — HYDROCHLOROTHIAZIDE 12.5 MG/1
12.5 TABLET ORAL DAILY
Status: ON HOLD | COMMUNITY
End: 2019-10-11 | Stop reason: HOSPADM

## 2019-10-09 RX ORDER — ASPIRIN 325 MG
325 TABLET ORAL
Status: COMPLETED | OUTPATIENT
Start: 2019-10-09 | End: 2019-10-09

## 2019-10-09 RX ORDER — IBUPROFEN 200 MG
200 CAPSULE ORAL DAILY
Status: DISCONTINUED | OUTPATIENT
Start: 2019-10-10 | End: 2019-10-11 | Stop reason: HOSPADM

## 2019-10-09 RX ORDER — METOPROLOL TARTRATE 50 MG/1
100 TABLET ORAL 2 TIMES DAILY
Qty: 60 TABLET | Refills: 11 | Status: SHIPPED | OUTPATIENT
Start: 2019-10-09 | End: 2019-12-17 | Stop reason: SDUPTHER

## 2019-10-09 RX ORDER — ACETAMINOPHEN 500 MG
500 TABLET ORAL EVERY 4 HOURS PRN
Status: DISCONTINUED | OUTPATIENT
Start: 2019-10-09 | End: 2019-10-09 | Stop reason: SDUPTHER

## 2019-10-09 RX ORDER — DILTIAZEM HYDROCHLORIDE 5 MG/ML
10 INJECTION INTRAVENOUS
Status: COMPLETED | OUTPATIENT
Start: 2019-10-09 | End: 2019-10-09

## 2019-10-09 RX ORDER — ACETAMINOPHEN 325 MG/1
650 TABLET ORAL EVERY 8 HOURS PRN
Status: DISCONTINUED | OUTPATIENT
Start: 2019-10-09 | End: 2019-10-09 | Stop reason: SDUPTHER

## 2019-10-09 RX ORDER — ACETAMINOPHEN 325 MG/1
650 TABLET ORAL EVERY 6 HOURS PRN
Status: DISCONTINUED | OUTPATIENT
Start: 2019-10-09 | End: 2019-10-09 | Stop reason: SDUPTHER

## 2019-10-09 RX ORDER — TRAVOPROST OPHTHALMIC SOLUTION 0.04 MG/ML
1 SOLUTION OPHTHALMIC NIGHTLY
Status: DISCONTINUED | OUTPATIENT
Start: 2019-10-09 | End: 2019-10-11 | Stop reason: HOSPADM

## 2019-10-09 RX ORDER — ONDANSETRON 8 MG/1
8 TABLET, ORALLY DISINTEGRATING ORAL EVERY 8 HOURS PRN
Status: DISCONTINUED | OUTPATIENT
Start: 2019-10-09 | End: 2019-10-11 | Stop reason: HOSPADM

## 2019-10-09 RX ORDER — DILTIAZEM HCL/D5W 125 MG/125
10 PLASTIC BAG, INJECTION (ML) INTRAVENOUS CONTINUOUS
Status: DISCONTINUED | OUTPATIENT
Start: 2019-10-09 | End: 2019-10-11

## 2019-10-09 RX ORDER — LOSARTAN POTASSIUM 50 MG/1
50 TABLET ORAL 2 TIMES DAILY
Status: DISCONTINUED | OUTPATIENT
Start: 2019-10-09 | End: 2019-10-10

## 2019-10-09 RX ADMIN — ACETAMINOPHEN 650 MG: 325 TABLET ORAL at 07:10

## 2019-10-09 RX ADMIN — ENOXAPARIN SODIUM 80 MG: 100 INJECTION SUBCUTANEOUS at 04:10

## 2019-10-09 RX ADMIN — APIXABAN 2.5 MG: 2.5 TABLET, FILM COATED ORAL at 09:10

## 2019-10-09 RX ADMIN — Medication 10 MG/HR: at 11:10

## 2019-10-09 RX ADMIN — DILTIAZEM HYDROCHLORIDE 10 MG: 5 INJECTION INTRAVENOUS at 09:10

## 2019-10-09 RX ADMIN — DILTIAZEM HYDROCHLORIDE 10 MG: 5 INJECTION INTRAVENOUS at 01:10

## 2019-10-09 RX ADMIN — ASPIRIN 325 MG ORAL TABLET 325 MG: 325 PILL ORAL at 01:10

## 2019-10-09 RX ADMIN — Medication 15 MG/HR: at 03:10

## 2019-10-09 RX ADMIN — TRAVOPROST 1 DROP: 0.04 SOLUTION/ DROPS OPHTHALMIC at 09:10

## 2019-10-09 RX ADMIN — Medication 5 MG/HR: at 04:10

## 2019-10-09 RX ADMIN — LOSARTAN POTASSIUM 50 MG: 50 TABLET, FILM COATED ORAL at 09:10

## 2019-10-09 RX ADMIN — METOPROLOL TARTRATE 100 MG: 50 TABLET ORAL at 09:10

## 2019-10-09 NOTE — ASSESSMENT & PLAN NOTE
Patient presented to ED due to chest pain and palpitations which occurred suddenly PTA  EKG revealed AFIB/AFL, variable rate control  Received IV Diltiazem bolus then IV Cardizem gtt started in ED  Received Lovenox 1 mg/kg SQ x 1 dose this AM in ED  Resume home Eliquis 2.5 mg BID for CVA prophylaxis  Increase Cardizem gtt to 15 mg/hr today  ECHO pending  NO CNS complaints to suggest TIA or CVA today  NO signs of abnormal bleeding  Repeat EKG in AM  Further recs to follow.     Discussed with patient in detail per Dr. Durán regarding risks, benefits and treatment alternatives regarding Eliquis for CVA prophylaxis. Patient agrees to proceed with Eliquis at this time.

## 2019-10-09 NOTE — TELEPHONE ENCOUNTER
Was seen by Dr. Randhawa on yesterday afternoon from consult from . Medication was changed a couple of weeks ago and did not help. Was diagnosed with A fib by Dr. Randhawa and she was started on eloquist and took about 5 pm. Now have a pain coming down left side of chest. Blood pressure 144/96. Pulse 130. Pt resides at [facility] and will have security call EMS for her now.    Reason for Disposition   [1] Chest pain lasts > 5 minutes AND [2] history of heart disease  (i.e., heart attack, bypass surgery, angina, angioplasty, CHF; not just a heart murmur)    Additional Information   Negative: Shock suspected (e.g., cold/pale/clammy skin, too weak to stand, low BP, rapid pulse)   Negative: Difficult to awaken or acting confused (e.g., disoriented, slurred speech)   Negative: Severe difficulty breathing (e.g., struggling for each breath, speaks in single words)    Protocols used: CHEST PAIN-A-AH

## 2019-10-09 NOTE — H&P (VIEW-ONLY)
[facility] -   Cardiology  Consult Note    Patient Name: Shirley Metz  MRN: [id number]  Admission Date: 10/9/2019  Hospital Length of Stay: 0 days  Code Status: Prior   Attending Provider: Emanuel Kwok MD   Consulting Provider: KE Almazan  Primary Care Physician: Yandy Terrell MD  Principal Problem:New onset a-fib    Patient information was obtained from patient, past medical records and ER records.     Inpatient consult to Cardiology  Consult performed by: KE Sanabria  Consult ordered by: Tracie Lancaster NP  Reason for consult: chest pain        Subjective:     Chief Complaint:  Chest pain, new onset AFIB     HPI:   Shirley Metz is a 86 year old female who presented to [facility] due to chest pain radiation down her left side which started last night PTA. She was recently seen by Dr. Castillo and started on Eliquis and her medications were adjusted. Her current medical conditions include Anemia, Basal cell carcinoma, HTN, HLP, AFIB. Of note, she has numerous intolerances with multiple cardiac medications. ED workup revealed , K+ 4.3, Cr 1.4, Troponin 0.033, EKG revealed AFL/AFIB, variable rate control. She received IV Diltiazem in ED and was started on IV Diltiazem drip. She also received Lovenox 1mg/kg this AM as well. Cardiology consulted to assist with medical management. Chart reviewed, Patient seen and examined in ED. Denies chest pain on exam today. She remains in AFIB, variable rate control at time of exam today. No shortness of breath, RUIZ or palpitations. NO leg swelling or claudications. Denies orthopnea, PND or abdominal bloating today. Will adjust Cardizem gtt to 15 mg/hr today for better rate control and give an additional bolus today. ECHO pending. Further recs to follow.          Past Medical History:   Diagnosis Date    Anemia 11/29/2018    Anxiety 11/28/2017    Arthritis     Atrial fibrillation with RVR 10/9/2019    Back pain      Basal cell carcinoma 03/29/2018    left mid back    Colon polyps     reported per patient.     Fuchs' corneal dystrophy     General anesthetics causing adverse effect in therapeutic use     woke up during surgery and gagged on ET tube    Glaucoma     Glaucoma     Heart failure with preserved left ventricular function (HFpEF) 7/24/2018    Hyperlipidemia     Hypertension     Macular degeneration dry    Obesity     Squamous cell carcinoma 01/08/2019    left shoulder    Trouble in sleeping     Urinary tract infection        Past Surgical History:   Procedure Laterality Date    ADENOIDECTOMY  1938    BREAST BIOPSY Left     1997. benign    BREAST CYST EXCISION Left 1997 approx    CARPAL TUNNEL RELEASE Right 2012 approx    CATARACT EXTRACTION Bilateral 1994    CHOLECYSTECTOMY  1975    CORNEAL TRANSPLANT Bilateral 08/2015 8/2015 - left; Right 4/2016    EYE SURGERY      Fuch's Corneal dystrophy - had 2nd operation with Dr. Quintanilla    EYE SURGERY      Cataract wIOL    left thumb Left 2011    removed cuboid for arthritis    REVERSE TOTAL SHOULDER ARTHROPLASTY Left 8/21/2018    Procedure: ARTHROPLASTY, SHOULDER, TOTAL, REVERSE;  Surgeon: Solomon Lujan MD;  Location: [facility] OR;  Service: Orthopedics;  Laterality: Left;  WITH BICEP TENODESIS    SKIN CANCER EXCISION Left 2017    BCC removal by Dr. Valadez    SLT- OS (aka TRABECULOPLASTY) Left 05/11/2011    repeat 3/14/12    TENOTOMY Left 8/21/2018    Procedure: TENOTOMY;  Surgeon: Solomon Lujan MD;  Location: [facility] OR;  Service: Orthopedics;  Laterality: Left;    TONSILLECTOMY  1938       Review of patient's allergies indicates:   Allergen Reactions    Claritin [loratadine] Other (See Comments)     Double vision/spots    Hydrocodone-acetaminophen      wheezing    Labetalol Shortness Of Breath, Nausea Only and Other (See Comments)     dizziness    Naproxen      wheezing    Verapamil Diarrhea    Vibramycin [doxycycline calcium] Other (See  Comments)     Weakness nausea   sob    Amlodipine      Myalgias      Coreg [carvedilol] Swelling     lips    Fenofibrate      Causes muscle weakness    Hydralazine analogues      Muscle tremors/aches      Isosorbide     Lasix [furosemide]      myalgias    Moxifloxacin Other (See Comments)     Hives   muscle soreness    Timolol     Adhesive Rash     Clonidine patch - 2 mfg - contact dermatitis    Allegra [fexofenadine] Other (See Comments)     Double vision/spots     Codeine Diarrhea and Other (See Comments)     Loss of balance     Erythromycin Other (See Comments)     Complete weakness/could not walk    Hydrocortisone (bulk) Other (See Comments)     Only suppository/ caused muscle weakness    Macrolide antibiotics Other (See Comments)     Complete weakness/could not walk    Patanol [olopatadine] Other (See Comments)     Muscle weakness    Prednisone Anxiety    Statins-hmg-coa reductase inhibitors Other (See Comments)     Muscle weakness    Xalatan [latanoprost] Other (See Comments)     Muscle weakness       Current Facility-Administered Medications on File Prior to Encounter   Medication    omnipaque 240 iohexol 2 mL    sodium chloride 0.9% flush 3 mL     Current Outpatient Medications on File Prior to Encounter   Medication Sig    acetaminophen (TYLENOL) 500 MG tablet Take 500 mg by mouth daily as needed. Taking 1 to 3    ALPHA LIPOIC ACID ORAL Take 200 mg by mouth.     ALPRAZolam (XANAX) 0.5 MG tablet TAKE 1/2 - 1 TABLET BY MOUTH NIGHTLY FOR SLEEP OR ANXIETY    apixaban (ELIQUIS) 2.5 mg Tab Take 1 tablet (2.5 mg total) by mouth 2 (two) times daily.    B complex-vitamin C-folic acid 0.8 mg Tab Take 1 tablet by mouth once daily.    Boswellia lashawn extract (BOSWELLIA LASHAWN XT, BULK, MISC) by Misc.(Non-Drug; Combo Route) route.    CALCIUM CITRATE ORAL Take by mouth.    cholecalciferol, vitamin D3, (VITAMIN D3) 2,000 unit Cap Take 1 capsule by mouth once daily.    CHROMIUM ORAL Take 200  mg by mouth once daily.    coenzyme Q10 200 mg capsule Take 400 mg by mouth once daily.     fish oil-omega-3 fatty acids 300-1,000 mg capsule Take 2 g by mouth 2 (two) times daily.     hydroCHLOROthiazide (HYDRODIURIL) 12.5 MG Tab Take 12.5 mg by mouth once daily.    losartan (COZAAR) 50 MG tablet Take 1 tablet (50 mg total) by mouth 2 (two) times daily.    LOTEMAX 0.5 % DrpG     POLYETHYLENE GLYCOL 3350 (MIRALAX ORAL) Take by mouth as needed.     pyridoxine, vitamin B6, (B-6) 100 MG Tab Take 100 mg by mouth once daily.     RHOPRESSA 0.02 % Drop     TRAVATAN Z 0.004 % ophthalmic solution PLACE 1 DROP INTO THE LEFT EYE EVERY EVENING.    TURMERIC ORAL Take 500 mg by mouth 2 (two) times daily.    vitamin E 400 UNIT capsule Take 400 Units by mouth once daily.     metoprolol tartrate (LOPRESSOR) 50 MG tablet Take 2 tablets (100 mg total) by mouth 2 (two) times daily.    [DISCONTINUED] diphenhydramine-acetaminophen (TYLENOL PM)  mg Tab Take 1 tablet by mouth nightly as needed.      Family History     Problem Relation (Age of Onset)    Cancer Father (88)    Heart disease Mother    Hypertension Mother        Tobacco Use    Smoking status: Never Smoker    Smokeless tobacco: Never Used    Tobacco comment: history of passive smoking from her ex-   Substance and Sexual Activity    Alcohol use: Yes     Alcohol/week: 2.0 standard drinks     Types: 2 Standard drinks or equivalent per week     Comment: occasional     Drug use: No    Sexual activity: Not Currently     Partners: Male     Birth control/protection: Post-menopausal     Comment: discussed protection for STD's     Review of Systems   Constitution: Positive for malaise/fatigue.   HENT: Negative for hearing loss and hoarse voice.    Eyes: Negative for blurred vision and visual disturbance.   Cardiovascular: Positive for chest pain, irregular heartbeat and palpitations. Negative for claudication, dyspnea on exertion, leg swelling,  near-syncope, orthopnea, paroxysmal nocturnal dyspnea and syncope.   Respiratory: Negative for cough, hemoptysis, shortness of breath, sleep disturbances due to breathing, snoring and wheezing.    Endocrine: Negative for cold intolerance and heat intolerance.   Hematologic/Lymphatic: Bruises/bleeds easily.   Skin: Negative for color change, dry skin and nail changes.   Musculoskeletal: Positive for arthritis and back pain. Negative for joint pain and myalgias.   Gastrointestinal: Negative for bloating, abdominal pain, constipation, nausea and vomiting.   Genitourinary: Negative for dysuria, flank pain, hematuria and hesitancy.   Neurological: Negative for headaches, light-headedness, loss of balance, numbness, paresthesias and weakness.   Psychiatric/Behavioral: Negative for altered mental status.   Allergic/Immunologic: Negative for environmental allergies.     Objective:     Vital Signs (Most Recent):  Temp: 98 °F (36.7 °C) (10/09/19 0739)  Pulse: (!) 111 (10/09/19 0739)  Resp: (!) 27 (10/09/19 0739)  BP: 135/68 (10/09/19 0739)  SpO2: 98 % (10/09/19 0739) Vital Signs (24h Range):  Temp:  [98 °F (36.7 °C)-98.3 °F (36.8 °C)] 98 °F (36.7 °C)  Pulse:  [] 111  Resp:  [16-27] 27  SpO2:  [95 %-99 %] 98 %  BP: (107-145)/(64-80) 135/68     Weight: 78 kg (172 lb)  Body mass index is 29.52 kg/m².    SpO2: 98 %  O2 Device (Oxygen Therapy): room air    No intake or output data in the 24 hours ending 10/09/19 1001    Lines/Drains/Airways     Peripheral Intravenous Line                 Peripheral IV - Single Lumen 10/09/19 0123 18 G Right Hand less than 1 day                Physical Exam   Constitutional: She is oriented to person, place, and time. She appears well-developed and well-nourished. No distress.   HENT:   Head: Normocephalic and atraumatic.   Eyes: Pupils are equal, round, and reactive to light.   Neck: Normal range of motion and full passive range of motion without pain. Neck supple. No JVD present.    Cardiovascular: S1 normal, S2 normal and intact distal pulses. An irregularly irregular rhythm present. Tachycardia present. PMI is not displaced. Exam reveals no distant heart sounds.   No murmur heard.  Pulses:       Radial pulses are 2+ on the right side, and 2+ on the left side.        Dorsalis pedis pulses are 2+ on the right side, and 2+ on the left side.   Remains in AFIB today, variable rate control   Pulmonary/Chest: Effort normal and breath sounds normal. No accessory muscle usage. No respiratory distress. She has no decreased breath sounds. She has no wheezes. She has no rales.   Abdominal: Soft. Bowel sounds are normal. She exhibits no distension. There is no tenderness.   Musculoskeletal: Normal range of motion. She exhibits no edema.        Right ankle: She exhibits no swelling.        Left ankle: She exhibits no swelling.   Neurological: She is alert and oriented to person, place, and time.   Skin: Skin is warm and dry. She is not diaphoretic. No cyanosis. Nails show no clubbing.   Psychiatric: She has a normal mood and affect. Her speech is normal and behavior is normal. Judgment and thought content normal. Cognition and memory are normal.   Nursing note and vitals reviewed.      Significant Labs:   CMP   Recent Labs   Lab 10/09/19  0203      K 4.3      CO2 22*   *   BUN 37*   CREATININE 1.4   CALCIUM 10.4   PROT 7.4   ALBUMIN 3.9   BILITOT 0.5   ALKPHOS 76   AST 42*   ALT 48*   ANIONGAP 15   ESTGFRAFRICA 39*   EGFRNONAA 34*   , CBC   Recent Labs   Lab 10/09/19  0203   WBC 10.14   HGB 12.1   HCT 37.1      , Troponin   Recent Labs   Lab 10/09/19  0203   TROPONINI 0.033*    and All pertinent lab results from the last 24 hours have been reviewed.    Significant Imaging: Echocardiogram:   2D echo with color flow doppler:   Results for orders placed or performed during the hospital encounter of 03/15/18   2D echo with color flow doppler   Result Value Ref Range    QEF 55 55 -  65    Diastolic Dysfunction No     Tricuspid Valve Regurgitation MILD     Narrative    Date of Procedure: 03/16/2018        TEST DESCRIPTION       Aorta: The aortic root is normal in size, measuring 2.9 cm at sinotubular junction and 3.1 cm at Sinuses of Valsalva. The proximal ascending aorta is normal in size, measuring 3.2 cm across.     Left Atrium: The left atrial volume index is normal, measuring 15.90 cc/m2.     Left Ventricle: The left ventricle is normal in size, with an end-diastolic diameter of 4.2 cm, and an end-systolic diameter of 2.5 cm. LV wall thickness is normal, with the septum measuring 1.3 cm and the posterior wall measuring 1.6 cm across. Relative   wall thickness was increased at 0.76, and the LV mass index was increased at 152.2 g/m2 consistent with concentric left ventricular hypertrophy. There are no regional wall motion abnormalities. Left ventricular systolic function appears normal. Visually   estimated ejection fraction is 55-60%. The LV Doppler derived stroke volume equals 90.0 ccs.     Diastolic indices: E wave velocity 1.1 m/s, E/A ratio 3.0,  msec., E/e' ratio(avg) 10. Diastolic function is normal.     Right Atrium: The right atrium is normal in size, measuring 4.2 cm in length in the apical view.     Right Ventricle: The right ventricle is normal in size. Global right ventricular systolic function appears normal. Tricuspid annular plane systolic excursion (TAPSE) is 1.3 cm.     Aortic Valve:  The aortic valve is normal in structure. The mean gradient obtained across the aortic valve is 6 mmHg.     Mitral Valve:  The mitral valve is normal in structure. The pressure half time is 54 msec. The calculated mitral valve area is 4.07 cm2.     Tricuspid Valve:  The tricuspid valve is normal in structure. There is mild tricuspid regurgitation.     Pulmonary Valve:  The pulmonic valve is not well seen.     IVC: IVC is normal in size and collapses > 50% with a sniff, suggesting normal  right atrial pressure of 3 mmHg.     Intracavitary: There is no evidence of pericardial effusion, intracavity mass, thrombi, or vegetation.         CONCLUSIONS     1 - Concentric hypertrophy.     2 - No wall motion abnormalities.     3 - Normal left ventricular systolic function (EF 55-60%).     4 - Normal left ventricular diastolic function.     5 - Normal right ventricular systolic function .     6 - Mild tricuspid regurgitation.             This document has been electronically    SIGNED BY: Samy Castillo MD On: 03/16/2018 12:40    and X-Ray: CXR: X-Ray Chest 1 View (CXR): No results found for this visit on 10/09/19.    Assessment and Plan:     * New onset a-fib  Patient presented to ED due to chest pain and palpitations which occurred suddenly PTA  EKG revealed AFIB/AFL, variable rate control  Received IV Diltiazem bolus then IV Cardizem gtt started in ED  Received Lovenox 1 mg/kg SQ x 1 dose this AM in ED  Resume home Eliquis 2.5 mg BID for CVA prophylaxis  Increase Cardizem gtt to 15 mg/hr today  ECHO pending  NO CNS complaints to suggest TIA or CVA today  NO signs of abnormal bleeding  Repeat EKG in AM  Further recs to follow.     Discussed with patient in detail per Dr. Durán regarding risks, benefits and treatment alternatives regarding Eliquis for CVA prophylaxis. Patient agrees to proceed with Eliquis at this time.     Chest pain  Presented to ED due to chest pain and found to be in AFIB, variable rate control  Chest pain resolved after receiving IV diltiazem  Troponin 0.033, continue to trend serial troponin  ECHO pending    Chronic kidney disease, stage 3  Monitor Closely  Recommend daily labs    HTN (hypertension)  -Continue Diltiazem gtt today  -Resume BB tomorrow, to allow room for increase in Cardizem gtt today.   -Monitor BP trend        VTE Risk Mitigation (From admission, onward)         Ordered     apixaban tablet 2.5 mg  2 times daily      10/09/19 0910                Thank you for your consult. I  will follow-up with patient. Please contact us if you have any additional questions.    KE Almazan  Cardiology   [facility] [place]

## 2019-10-09 NOTE — ED PROVIDER NOTES
SCRIBE #1 NOTE: I, Bradford Castle, am scribing for, and in the presence of, Chester Graham Jr., MD. I have scribed the entire note.      History      Chief Complaint   Patient presents with    Chest Pain     afib RVR on scene 140's       Review of patient's allergies indicates:   Allergen Reactions    Claritin [loratadine] Other (See Comments)     Double vision/spots    Hydrocodone-acetaminophen      wheezing    Labetalol Shortness Of Breath, Nausea Only and Other (See Comments)     dizziness    Naproxen      wheezing    Verapamil Diarrhea    Vibramycin [doxycycline calcium] Other (See Comments)     Weakness nausea   sob    Amlodipine      Myalgias      Coreg [carvedilol] Swelling     lips    Fenofibrate      Causes muscle weakness    Hydralazine analogues      Muscle tremors/aches      Isosorbide     Lasix [furosemide]      myalgias    Moxifloxacin Other (See Comments)     Hives   muscle soreness    Timolol     Adhesive Rash     Clonidine patch - 2 mfg - contact dermatitis    Allegra [fexofenadine] Other (See Comments)     Double vision/spots     Codeine Diarrhea and Other (See Comments)     Loss of balance     Erythromycin Other (See Comments)     Complete weakness/could not walk    Hydrocortisone (bulk) Other (See Comments)     Only suppository/ caused muscle weakness    Macrolide antibiotics Other (See Comments)     Complete weakness/could not walk    Patanol [olopatadine] Other (See Comments)     Muscle weakness    Prednisone Anxiety    Statins-hmg-coa reductase inhibitors Other (See Comments)     Muscle weakness    Xalatan [latanoprost] Other (See Comments)     Muscle weakness        HPI   HPI    10/9/2019, 1:35 AM   History obtained from the patient      History of Present Illness: Shirley Metz is a 86 y.o. female patient with a PMHx of anemia, basal cell carcinoma, Flaucoma, Hyperlipidemia, and HTN who presents to the Emergency Department for CP radiating down left side  which onset gradually 2 weeks PTA. Pt states her BP has been fluctuating and had a change in BP meds by Dr. Franks (Cardiology). Symptoms are constant and moderate in severity. No mitigating or exacerbating factors reported. No associated sxs included. Patient denies any diaphoresis, cough, SOB, palpitations, leg swelling/pain, N/V/D, dizziness, extremity weakness/numbness, and all other sxs at this time. Prior Tx includes eliquis with no relief. No further complaints or concerns at this time.       Arrival mode: Personal vehicle     PCP: Yandy Terrell MD       Past Medical History:  Past Medical History:   Diagnosis Date    Anemia 11/29/2018    Anxiety 11/28/2017    Arthritis     Atrial fibrillation with RVR 10/9/2019    Back pain     Basal cell carcinoma 03/29/2018    left mid back    Colon polyps     reported per patient.     Fuchs' corneal dystrophy     General anesthetics causing adverse effect in therapeutic use     woke up during surgery and gagged on ET tube    Glaucoma     Glaucoma     Heart failure with preserved left ventricular function (HFpEF) 7/24/2018    Hyperlipidemia     Hypertension     Macular degeneration dry    Obesity     Squamous cell carcinoma 01/08/2019    left shoulder    Trouble in sleeping     Urinary tract infection        Past Surgical History:  Past Surgical History:   Procedure Laterality Date    ADENOIDECTOMY  1938    BREAST BIOPSY Left     1997. benign    BREAST CYST EXCISION Left 1997 approx    CARPAL TUNNEL RELEASE Right 2012 approx    CATARACT EXTRACTION Bilateral 1994    CHOLECYSTECTOMY  1975    CORNEAL TRANSPLANT Bilateral 08/2015 8/2015 - left; Right 4/2016    EYE SURGERY      Fuch's Corneal dystrophy - had 2nd operation with Dr. Quintanilla    EYE SURGERY      Cataract wIOL    left thumb Left 2011    removed cuboid for arthritis    REVERSE TOTAL SHOULDER ARTHROPLASTY Left 8/21/2018    Procedure: ARTHROPLASTY, SHOULDER, TOTAL, REVERSE;  Surgeon:  Solomon Lujan MD;  Location: [facility] OR;  Service: Orthopedics;  Laterality: Left;  WITH BICEP TENODESIS    SKIN CANCER EXCISION Left 2017    BCC removal by Dr. Valadez    SLT- OS (aka TRABECULOPLASTY) Left 05/11/2011    repeat 3/14/12    TENOTOMY Left 8/21/2018    Procedure: TENOTOMY;  Surgeon: Solomon Lujan MD;  Location: [facility] OR;  Service: Orthopedics;  Laterality: Left;    TONSILLECTOMY  1938         Family History:  Family History   Problem Relation Age of Onset    Heart disease Mother     Hypertension Mother     Cancer Father 88        sarcoma( ?[place]'s ) on leg    Deep vein thrombosis Neg Hx     Ovarian cancer Neg Hx     Breast cancer Neg Hx     Kidney disease Neg Hx     Strabismus Neg Hx     Retinal detachment Neg Hx     Macular degeneration Neg Hx     Glaucoma Neg Hx     Blindness Neg Hx     Amblyopia Neg Hx     Eczema Neg Hx     Lupus Neg Hx     Melanoma Neg Hx     Psoriasis Neg Hx     Diabetes Neg Hx     Stroke Neg Hx     Mental retardation Neg Hx     Mental illness Neg Hx     Hyperlipidemia Neg Hx     COPD Neg Hx     Asthma Neg Hx     Depression Neg Hx     Alcohol abuse Neg Hx     Drug abuse Neg Hx        Social History:  Social History     Tobacco Use    Smoking status: Never Smoker    Smokeless tobacco: Never Used    Tobacco comment: history of passive smoking from her ex-   Substance and Sexual Activity    Alcohol use: Yes     Alcohol/week: 2.0 standard drinks     Types: 2 Standard drinks or equivalent per week     Comment: occasional     Drug use: No    Sexual activity: Not Currently     Partners: Male     Birth control/protection: Post-menopausal     Comment: discussed protection for STD's       ROS   Review of Systems   Constitutional: Negative for diaphoresis and fever.   HENT: Negative for sore throat.    Respiratory: Negative for cough and shortness of breath.    Cardiovascular: Positive for chest pain. Negative for palpitations and leg  swelling.   Gastrointestinal: Negative for diarrhea, nausea and vomiting.   Genitourinary: Negative for dysuria.   Musculoskeletal: Negative for back pain.        (-) leg pain     Skin: Negative for rash.   Neurological: Negative for dizziness, weakness and numbness.   Hematological: Does not bruise/bleed easily.   All other systems reviewed and are negative.    Physical Exam      Initial Vitals [10/09/19 0108]   BP Pulse Resp Temp SpO2   126/80 (!) 120 16 98.3 °F (36.8 °C) 99 %      MAP       --          Physical Exam  Nursing Notes and Vital Signs Reviewed.  Constitutional: Patient is in no acute distress. Well-developed and well-nourished.  Head: Atraumatic. Normocephalic.  Eyes: PERRL. EOM intact. Conjunctivae are not pale. No scleral icterus.  ENT: Mucous membranes are moist. Oropharynx is clear and symmetric.    Neck: Supple. Full ROM. No lymphadenopathy.  Cardiovascular: Tachycardic. Regularly irregular rhythm . No murmurs, rubs, or gallops. Distal pulses are 2+ and symmetric.  Pulmonary/Chest: No respiratory distress. Clear to auscultation bilaterally. No wheezing or rales.  Abdominal: Soft and non-distended.  There is no tenderness.  No rebound, guarding, or rigidity. Good bowel sounds.  Genitourinary: No CVA tenderness  Musculoskeletal: Moves all extremities. No obvious deformities. No edema. No calf tenderness.  Skin: Warm and dry.  Neurological:  Alert, awake, and appropriate.  Normal speech.  No acute focal neurological deficits are appreciated.  Psychiatric: Normal affect. Good eye contact. Appropriate in content.    ED Course    Critical Care  Date/Time: 10/9/2019 5:02 AM  Performed by: Chester Graham Jr., MD  Authorized by: Chester Graham Jr., MD   Direct patient critical care time: 20 minutes  Additional history critical care time: 20 minutes  Ordering / reviewing critical care time: 15 minutes  Documentation critical care time: 10 minutes  Consulting other physicians critical care time: 10  "minutes  Total critical care time (exclusive of procedural time) : 75 minutes  Critical care time was exclusive of separately billable procedures and treating other patients and teaching time.  Critical care was necessary to treat or prevent imminent or life-threatening deterioration of the following conditions: atrial fibrillation with RVR.  Critical care was time spent personally by me on the following activities: blood draw for specimens, discussions with consultants, development of treatment plan with patient or surrogate, interpretation of cardiac output measurements, evaluation of patient's response to treatment, examination of patient, obtaining history from patient or surrogate, ordering and performing treatments and interventions, ordering and review of laboratory studies, pulse oximetry, review of old charts, re-evaluation of patient's condition and ordering and review of radiographic studies.        ED Vital Signs:  Vitals:    10/09/19 0108 10/09/19 0127 10/09/19 0131 10/09/19 0201   BP: 126/80  (!) 145/68 107/67   Pulse: (!) 120 (!) 116 (!) 131 92   Resp: 16  18 20   Temp: 98.3 °F (36.8 °C)      TempSrc: Oral      SpO2: 99%  96% 97%   Weight: 78 kg (172 lb)      Height: 5' 4" (1.626 m)       10/09/19 0231 10/09/19 0331   BP: 113/64 131/80   Pulse: 99 105   Resp: 20 17   Temp:     TempSrc:     SpO2: 96% 96%   Weight:     Height:         Abnormal Lab Results:  Labs Reviewed   CBC W/ AUTO DIFFERENTIAL - Abnormal; Notable for the following components:       Result Value    RBC 3.81 (*)     Mean Corpuscular Hemoglobin 31.8 (*)     All other components within normal limits   COMPREHENSIVE METABOLIC PANEL - Abnormal; Notable for the following components:    CO2 22 (*)     Glucose 120 (*)     BUN, Bld 37 (*)     AST 42 (*)     ALT 48 (*)     eGFR if  39 (*)     eGFR if non  34 (*)     All other components within normal limits   TROPONIN I - Abnormal; Notable for the following " components:    Troponin I 0.033 (*)     All other components within normal limits   B-TYPE NATRIURETIC PEPTIDE - Abnormal; Notable for the following components:     (*)     All other components within normal limits   MAGNESIUM   TSH        All Lab Results:  Results for orders placed or performed during the hospital encounter of 10/09/19   CBC auto differential   Result Value Ref Range    WBC 10.14 3.90 - 12.70 K/uL    RBC 3.81 (L) 4.00 - 5.40 M/uL    Hemoglobin 12.1 12.0 - 16.0 g/dL    Hematocrit 37.1 37.0 - 48.5 %    Mean Corpuscular Volume 97 82 - 98 fL    Mean Corpuscular Hemoglobin 31.8 (H) 27.0 - 31.0 pg    Mean Corpuscular Hemoglobin Conc 32.6 32.0 - 36.0 g/dL    RDW 13.6 11.5 - 14.5 %    Platelets 255 150 - 350 K/uL    MPV 10.1 9.2 - 12.9 fL    Immature Granulocytes 0.3 0.0 - 0.5 %    Gran # (ANC) 6.7 1.8 - 7.7 K/uL    Immature Grans (Abs) 0.03 0.00 - 0.04 K/uL    Lymph # 2.2 1.0 - 4.8 K/uL    Mono # 0.9 0.3 - 1.0 K/uL    Eos # 0.2 0.0 - 0.5 K/uL    Baso # 0.07 0.00 - 0.20 K/uL    nRBC 0 0 /100 WBC    Gran% 66.4 38.0 - 73.0 %    Lymph% 21.2 18.0 - 48.0 %    Mono% 9.2 4.0 - 15.0 %    Eosinophil% 2.2 0.0 - 8.0 %    Basophil% 0.7 0.0 - 1.9 %    Differential Method Automated    Comprehensive metabolic panel   Result Value Ref Range    Sodium 142 136 - 145 mmol/L    Potassium 4.3 3.5 - 5.1 mmol/L    Chloride 105 95 - 110 mmol/L    CO2 22 (L) 23 - 29 mmol/L    Glucose 120 (H) 70 - 110 mg/dL    BUN, Bld 37 (H) 8 - 23 mg/dL    Creatinine 1.4 0.5 - 1.4 mg/dL    Calcium 10.4 8.7 - 10.5 mg/dL    Total Protein 7.4 6.0 - 8.4 g/dL    Albumin 3.9 3.5 - 5.2 g/dL    Total Bilirubin 0.5 0.1 - 1.0 mg/dL    Alkaline Phosphatase 76 55 - 135 U/L    AST 42 (H) 10 - 40 U/L    ALT 48 (H) 10 - 44 U/L    Anion Gap 15 8 - 16 mmol/L    eGFR if African American 39 (A) >60 mL/min/1.73 m^2    eGFR if non African American 34 (A) >60 mL/min/1.73 m^2   Troponin I #1   Result Value Ref Range    Troponin I 0.033 (H) 0.000 - 0.026 ng/mL    B-Type natriuretic peptide (BNP)   Result Value Ref Range     (H) 0 - 99 pg/mL         Imaging Results:  Imaging Results          X-Ray Chest AP Portable (In process)                The EKG was ordered, reviewed, and independently interpreted by the ED provider.  Interpretation time: 01:22  Rate: 116 BPM  Rhythm: atrial flutter and variable AV block  Interpretation: Abnormal ECG. No STEMI.           The Emergency Provider reviewed the vital signs and test results, which are outlined above.    ED Discussion     3:14 AM: Re-evaluated pt. Heart Rate is persistently in the 120-130s. Ordered a cardizem drip. Pt is resting comfortably and is in no acute distress.  D/w pt all pertinent results. D/w pt any concerns expressed at this time. Answered all questions. Pt expresses understanding at this time.    3:35 AM: Discussed case with Tracie Lancaster NP (Hospital Medicine). Dr. Blair agrees with current care and management of pt and accepts admission.   Admitting Service: Hospital Medicine  Admitting Physician: Dr. Blair  Admit to: Inpatient Tele    3:38 AM: Re-evaluated pt. I have discussed test results, shared treatment plan, and the need for admission with patient and family at bedside. Pt and family express understanding at this time and agree with all information. All questions answered. Pt and family have no further questions or concerns at this time. Pt is ready for admit.      ED Medication(s):  Medications   diltiaZEM 125 mg in dextrose 5% 125 mL infusion (non-titrating) (5 mg/hr Intravenous New Bag 10/9/19 0405)   enoxaparin injection 80 mg (80 mg Subcutaneous Given 10/9/19 041)   aspirin tablet 325 mg (325 mg Oral Given 10/9/19 0159)   diltiaZEM injection 10 mg (10 mg Intravenous Given 10/9/19 0148)             Medical Decision Making    Medical Decision Making:   Clinical Tests:   Lab Tests: Reviewed and Ordered  Radiological Study: Reviewed and Ordered  Medical Tests: Ordered and Reviewed            Scribe Attestation:   Scribe #1: I performed the above scribed service and the documentation accurately describes the services I performed. I attest to the accuracy of the note.    Attending:   Physician Attestation Statement for Scribe #1: I, Chester Graham Jr., MD, personally performed the services described in this documentation, as scribed by Bradford Castle, in my presence, and it is both accurate and complete.          Clinical Impression       ICD-10-CM ICD-9-CM   1. New onset a-fib I48.91 427.31   2. Chest pain R07.9 786.50   3. Atrial fibrillation with RVR I48.91 427.31   4. New onset atrial fibrillation I48.91 427.31       Disposition:   Disposition: Admitted (Inpatient Tele)  Condition: Fair         Chester Graham Jr., MD  10/09/19 0521

## 2019-10-09 NOTE — ASSESSMENT & PLAN NOTE
Presented to ED due to chest pain and found to be in AFIB, variable rate control  Chest pain resolved after receiving IV diltiazem  Troponin 0.033, continue to trend serial troponin  ECHO pending

## 2019-10-09 NOTE — CONSULTS
[facility] -   Cardiology  Consult Note    Patient Name: Shirley Metz  MRN: [id number]  Admission Date: 10/9/2019  Hospital Length of Stay: 0 days  Code Status: Prior   Attending Provider: Emanuel Kwok MD   Consulting Provider: KE Almazan  Primary Care Physician: Yandy Terrell MD  Principal Problem:New onset a-fib    Patient information was obtained from patient, past medical records and ER records.     Inpatient consult to Cardiology  Consult performed by: KE Sanabria  Consult ordered by: Tracie Lancaster NP  Reason for consult: chest pain        Subjective:     Chief Complaint:  Chest pain, new onset AFIB     HPI:   Shirley Metz is a 86 year old female who presented to [facility] due to chest pain radiation down her left side which started last night PTA. She was recently seen by Dr. Castillo and started on Eliquis and her medications were adjusted. Her current medical conditions include Anemia, Basal cell carcinoma, HTN, HLP, AFIB. Of note, she has numerous intolerances with multiple cardiac medications. ED workup revealed , K+ 4.3, Cr 1.4, Troponin 0.033, EKG revealed AFL/AFIB, variable rate control. She received IV Diltiazem in ED and was started on IV Diltiazem drip. She also received Lovenox 1mg/kg this AM as well. Cardiology consulted to assist with medical management. Chart reviewed, Patient seen and examined in ED. Denies chest pain on exam today. She remains in AFIB, variable rate control at time of exam today. No shortness of breath, RUIZ or palpitations. NO leg swelling or claudications. Denies orthopnea, PND or abdominal bloating today. Will adjust Cardizem gtt to 15 mg/hr today for better rate control and give an additional bolus today. ECHO pending. Further recs to follow.          Past Medical History:   Diagnosis Date    Anemia 11/29/2018    Anxiety 11/28/2017    Arthritis     Atrial fibrillation with RVR 10/9/2019    Back pain      Basal cell carcinoma 03/29/2018    left mid back    Colon polyps     reported per patient.     Fuchs' corneal dystrophy     General anesthetics causing adverse effect in therapeutic use     woke up during surgery and gagged on ET tube    Glaucoma     Glaucoma     Heart failure with preserved left ventricular function (HFpEF) 7/24/2018    Hyperlipidemia     Hypertension     Macular degeneration dry    Obesity     Squamous cell carcinoma 01/08/2019    left shoulder    Trouble in sleeping     Urinary tract infection        Past Surgical History:   Procedure Laterality Date    ADENOIDECTOMY  1938    BREAST BIOPSY Left     1997. benign    BREAST CYST EXCISION Left 1997 approx    CARPAL TUNNEL RELEASE Right 2012 approx    CATARACT EXTRACTION Bilateral 1994    CHOLECYSTECTOMY  1975    CORNEAL TRANSPLANT Bilateral 08/2015 8/2015 - left; Right 4/2016    EYE SURGERY      Fuch's Corneal dystrophy - had 2nd operation with Dr. Quintanilla    EYE SURGERY      Cataract wIOL    left thumb Left 2011    removed cuboid for arthritis    REVERSE TOTAL SHOULDER ARTHROPLASTY Left 8/21/2018    Procedure: ARTHROPLASTY, SHOULDER, TOTAL, REVERSE;  Surgeon: Solomon Lujan MD;  Location: [facility] OR;  Service: Orthopedics;  Laterality: Left;  WITH BICEP TENODESIS    SKIN CANCER EXCISION Left 2017    BCC removal by Dr. Valadez    SLT- OS (aka TRABECULOPLASTY) Left 05/11/2011    repeat 3/14/12    TENOTOMY Left 8/21/2018    Procedure: TENOTOMY;  Surgeon: Solomon Lujan MD;  Location: [facility] OR;  Service: Orthopedics;  Laterality: Left;    TONSILLECTOMY  1938       Review of patient's allergies indicates:   Allergen Reactions    Claritin [loratadine] Other (See Comments)     Double vision/spots    Hydrocodone-acetaminophen      wheezing    Labetalol Shortness Of Breath, Nausea Only and Other (See Comments)     dizziness    Naproxen      wheezing    Verapamil Diarrhea    Vibramycin [doxycycline calcium] Other (See  Comments)     Weakness nausea   sob    Amlodipine      Myalgias      Coreg [carvedilol] Swelling     lips    Fenofibrate      Causes muscle weakness    Hydralazine analogues      Muscle tremors/aches      Isosorbide     Lasix [furosemide]      myalgias    Moxifloxacin Other (See Comments)     Hives   muscle soreness    Timolol     Adhesive Rash     Clonidine patch - 2 mfg - contact dermatitis    Allegra [fexofenadine] Other (See Comments)     Double vision/spots     Codeine Diarrhea and Other (See Comments)     Loss of balance     Erythromycin Other (See Comments)     Complete weakness/could not walk    Hydrocortisone (bulk) Other (See Comments)     Only suppository/ caused muscle weakness    Macrolide antibiotics Other (See Comments)     Complete weakness/could not walk    Patanol [olopatadine] Other (See Comments)     Muscle weakness    Prednisone Anxiety    Statins-hmg-coa reductase inhibitors Other (See Comments)     Muscle weakness    Xalatan [latanoprost] Other (See Comments)     Muscle weakness       Current Facility-Administered Medications on File Prior to Encounter   Medication    omnipaque 240 iohexol 2 mL    sodium chloride 0.9% flush 3 mL     Current Outpatient Medications on File Prior to Encounter   Medication Sig    acetaminophen (TYLENOL) 500 MG tablet Take 500 mg by mouth daily as needed. Taking 1 to 3    ALPHA LIPOIC ACID ORAL Take 200 mg by mouth.     ALPRAZolam (XANAX) 0.5 MG tablet TAKE 1/2 - 1 TABLET BY MOUTH NIGHTLY FOR SLEEP OR ANXIETY    apixaban (ELIQUIS) 2.5 mg Tab Take 1 tablet (2.5 mg total) by mouth 2 (two) times daily.    B complex-vitamin C-folic acid 0.8 mg Tab Take 1 tablet by mouth once daily.    Boswellia lashawn extract (BOSWELLIA LASHAWN XT, BULK, MISC) by Misc.(Non-Drug; Combo Route) route.    CALCIUM CITRATE ORAL Take by mouth.    cholecalciferol, vitamin D3, (VITAMIN D3) 2,000 unit Cap Take 1 capsule by mouth once daily.    CHROMIUM ORAL Take 200  mg by mouth once daily.    coenzyme Q10 200 mg capsule Take 400 mg by mouth once daily.     fish oil-omega-3 fatty acids 300-1,000 mg capsule Take 2 g by mouth 2 (two) times daily.     hydroCHLOROthiazide (HYDRODIURIL) 12.5 MG Tab Take 12.5 mg by mouth once daily.    losartan (COZAAR) 50 MG tablet Take 1 tablet (50 mg total) by mouth 2 (two) times daily.    LOTEMAX 0.5 % DrpG     POLYETHYLENE GLYCOL 3350 (MIRALAX ORAL) Take by mouth as needed.     pyridoxine, vitamin B6, (B-6) 100 MG Tab Take 100 mg by mouth once daily.     RHOPRESSA 0.02 % Drop     TRAVATAN Z 0.004 % ophthalmic solution PLACE 1 DROP INTO THE LEFT EYE EVERY EVENING.    TURMERIC ORAL Take 500 mg by mouth 2 (two) times daily.    vitamin E 400 UNIT capsule Take 400 Units by mouth once daily.     metoprolol tartrate (LOPRESSOR) 50 MG tablet Take 2 tablets (100 mg total) by mouth 2 (two) times daily.    [DISCONTINUED] diphenhydramine-acetaminophen (TYLENOL PM)  mg Tab Take 1 tablet by mouth nightly as needed.      Family History     Problem Relation (Age of Onset)    Cancer Father (88)    Heart disease Mother    Hypertension Mother        Tobacco Use    Smoking status: Never Smoker    Smokeless tobacco: Never Used    Tobacco comment: history of passive smoking from her ex-   Substance and Sexual Activity    Alcohol use: Yes     Alcohol/week: 2.0 standard drinks     Types: 2 Standard drinks or equivalent per week     Comment: occasional     Drug use: No    Sexual activity: Not Currently     Partners: Male     Birth control/protection: Post-menopausal     Comment: discussed protection for STD's     Review of Systems   Constitution: Positive for malaise/fatigue.   HENT: Negative for hearing loss and hoarse voice.    Eyes: Negative for blurred vision and visual disturbance.   Cardiovascular: Positive for chest pain, irregular heartbeat and palpitations. Negative for claudication, dyspnea on exertion, leg swelling,  near-syncope, orthopnea, paroxysmal nocturnal dyspnea and syncope.   Respiratory: Negative for cough, hemoptysis, shortness of breath, sleep disturbances due to breathing, snoring and wheezing.    Endocrine: Negative for cold intolerance and heat intolerance.   Hematologic/Lymphatic: Bruises/bleeds easily.   Skin: Negative for color change, dry skin and nail changes.   Musculoskeletal: Positive for arthritis and back pain. Negative for joint pain and myalgias.   Gastrointestinal: Negative for bloating, abdominal pain, constipation, nausea and vomiting.   Genitourinary: Negative for dysuria, flank pain, hematuria and hesitancy.   Neurological: Negative for headaches, light-headedness, loss of balance, numbness, paresthesias and weakness.   Psychiatric/Behavioral: Negative for altered mental status.   Allergic/Immunologic: Negative for environmental allergies.     Objective:     Vital Signs (Most Recent):  Temp: 98 °F (36.7 °C) (10/09/19 0739)  Pulse: (!) 111 (10/09/19 0739)  Resp: (!) 27 (10/09/19 0739)  BP: 135/68 (10/09/19 0739)  SpO2: 98 % (10/09/19 0739) Vital Signs (24h Range):  Temp:  [98 °F (36.7 °C)-98.3 °F (36.8 °C)] 98 °F (36.7 °C)  Pulse:  [] 111  Resp:  [16-27] 27  SpO2:  [95 %-99 %] 98 %  BP: (107-145)/(64-80) 135/68     Weight: 78 kg (172 lb)  Body mass index is 29.52 kg/m².    SpO2: 98 %  O2 Device (Oxygen Therapy): room air    No intake or output data in the 24 hours ending 10/09/19 1001    Lines/Drains/Airways     Peripheral Intravenous Line                 Peripheral IV - Single Lumen 10/09/19 0123 18 G Right Hand less than 1 day                Physical Exam   Constitutional: She is oriented to person, place, and time. She appears well-developed and well-nourished. No distress.   HENT:   Head: Normocephalic and atraumatic.   Eyes: Pupils are equal, round, and reactive to light.   Neck: Normal range of motion and full passive range of motion without pain. Neck supple. No JVD present.    Cardiovascular: S1 normal, S2 normal and intact distal pulses. An irregularly irregular rhythm present. Tachycardia present. PMI is not displaced. Exam reveals no distant heart sounds.   No murmur heard.  Pulses:       Radial pulses are 2+ on the right side, and 2+ on the left side.        Dorsalis pedis pulses are 2+ on the right side, and 2+ on the left side.   Remains in AFIB today, variable rate control   Pulmonary/Chest: Effort normal and breath sounds normal. No accessory muscle usage. No respiratory distress. She has no decreased breath sounds. She has no wheezes. She has no rales.   Abdominal: Soft. Bowel sounds are normal. She exhibits no distension. There is no tenderness.   Musculoskeletal: Normal range of motion. She exhibits no edema.        Right ankle: She exhibits no swelling.        Left ankle: She exhibits no swelling.   Neurological: She is alert and oriented to person, place, and time.   Skin: Skin is warm and dry. She is not diaphoretic. No cyanosis. Nails show no clubbing.   Psychiatric: She has a normal mood and affect. Her speech is normal and behavior is normal. Judgment and thought content normal. Cognition and memory are normal.   Nursing note and vitals reviewed.      Significant Labs:   CMP   Recent Labs   Lab 10/09/19  0203      K 4.3      CO2 22*   *   BUN 37*   CREATININE 1.4   CALCIUM 10.4   PROT 7.4   ALBUMIN 3.9   BILITOT 0.5   ALKPHOS 76   AST 42*   ALT 48*   ANIONGAP 15   ESTGFRAFRICA 39*   EGFRNONAA 34*   , CBC   Recent Labs   Lab 10/09/19  0203   WBC 10.14   HGB 12.1   HCT 37.1      , Troponin   Recent Labs   Lab 10/09/19  0203   TROPONINI 0.033*    and All pertinent lab results from the last 24 hours have been reviewed.    Significant Imaging: Echocardiogram:   2D echo with color flow doppler:   Results for orders placed or performed during the hospital encounter of 03/15/18   2D echo with color flow doppler   Result Value Ref Range    QEF 55 55 -  65    Diastolic Dysfunction No     Tricuspid Valve Regurgitation MILD     Narrative    Date of Procedure: 03/16/2018        TEST DESCRIPTION       Aorta: The aortic root is normal in size, measuring 2.9 cm at sinotubular junction and 3.1 cm at Sinuses of Valsalva. The proximal ascending aorta is normal in size, measuring 3.2 cm across.     Left Atrium: The left atrial volume index is normal, measuring 15.90 cc/m2.     Left Ventricle: The left ventricle is normal in size, with an end-diastolic diameter of 4.2 cm, and an end-systolic diameter of 2.5 cm. LV wall thickness is normal, with the septum measuring 1.3 cm and the posterior wall measuring 1.6 cm across. Relative   wall thickness was increased at 0.76, and the LV mass index was increased at 152.2 g/m2 consistent with concentric left ventricular hypertrophy. There are no regional wall motion abnormalities. Left ventricular systolic function appears normal. Visually   estimated ejection fraction is 55-60%. The LV Doppler derived stroke volume equals 90.0 ccs.     Diastolic indices: E wave velocity 1.1 m/s, E/A ratio 3.0,  msec., E/e' ratio(avg) 10. Diastolic function is normal.     Right Atrium: The right atrium is normal in size, measuring 4.2 cm in length in the apical view.     Right Ventricle: The right ventricle is normal in size. Global right ventricular systolic function appears normal. Tricuspid annular plane systolic excursion (TAPSE) is 1.3 cm.     Aortic Valve:  The aortic valve is normal in structure. The mean gradient obtained across the aortic valve is 6 mmHg.     Mitral Valve:  The mitral valve is normal in structure. The pressure half time is 54 msec. The calculated mitral valve area is 4.07 cm2.     Tricuspid Valve:  The tricuspid valve is normal in structure. There is mild tricuspid regurgitation.     Pulmonary Valve:  The pulmonic valve is not well seen.     IVC: IVC is normal in size and collapses > 50% with a sniff, suggesting normal  right atrial pressure of 3 mmHg.     Intracavitary: There is no evidence of pericardial effusion, intracavity mass, thrombi, or vegetation.         CONCLUSIONS     1 - Concentric hypertrophy.     2 - No wall motion abnormalities.     3 - Normal left ventricular systolic function (EF 55-60%).     4 - Normal left ventricular diastolic function.     5 - Normal right ventricular systolic function .     6 - Mild tricuspid regurgitation.             This document has been electronically    SIGNED BY: Samy Castillo MD On: 03/16/2018 12:40    and X-Ray: CXR: X-Ray Chest 1 View (CXR): No results found for this visit on 10/09/19.    Assessment and Plan:     * New onset a-fib  Patient presented to ED due to chest pain and palpitations which occurred suddenly PTA  EKG revealed AFIB/AFL, variable rate control  Received IV Diltiazem bolus then IV Cardizem gtt started in ED  Received Lovenox 1 mg/kg SQ x 1 dose this AM in ED  Resume home Eliquis 2.5 mg BID for CVA prophylaxis  Increase Cardizem gtt to 15 mg/hr today  ECHO pending  NO CNS complaints to suggest TIA or CVA today  NO signs of abnormal bleeding  Repeat EKG in AM  Further recs to follow.     Discussed with patient in detail per Dr. Durán regarding risks, benefits and treatment alternatives regarding Eliquis for CVA prophylaxis. Patient agrees to proceed with Eliquis at this time.     Chest pain  Presented to ED due to chest pain and found to be in AFIB, variable rate control  Chest pain resolved after receiving IV diltiazem  Troponin 0.033, continue to trend serial troponin  ECHO pending    Chronic kidney disease, stage 3  Monitor Closely  Recommend daily labs    HTN (hypertension)  -Continue Diltiazem gtt today  -Resume BB tomorrow, to allow room for increase in Cardizem gtt today.   -Monitor BP trend        VTE Risk Mitigation (From admission, onward)         Ordered     apixaban tablet 2.5 mg  2 times daily      10/09/19 0910                Thank you for your consult. I  will follow-up with patient. Please contact us if you have any additional questions.    KE Almazan  Cardiology   [facility] [place]

## 2019-10-09 NOTE — ASSESSMENT & PLAN NOTE
-Continue Diltiazem gtt today  -Resume BB tomorrow, to allow room for increase in Cardizem gtt today.   -Monitor BP trend

## 2019-10-09 NOTE — SUBJECTIVE & OBJECTIVE
Past Medical History:   Diagnosis Date    Anemia 11/29/2018    Anxiety 11/28/2017    Arthritis     Atrial fibrillation with RVR 10/9/2019    Back pain     Basal cell carcinoma 03/29/2018    left mid back    Colon polyps     reported per patient.     Fuchs' corneal dystrophy     General anesthetics causing adverse effect in therapeutic use     woke up during surgery and gagged on ET tube    Glaucoma     Glaucoma     Heart failure with preserved left ventricular function (HFpEF) 7/24/2018    Hyperlipidemia     Hypertension     Macular degeneration dry    Obesity     Squamous cell carcinoma 01/08/2019    left shoulder    Trouble in sleeping     Urinary tract infection        Past Surgical History:   Procedure Laterality Date    ADENOIDECTOMY  1938    BREAST BIOPSY Left     1997. benign    BREAST CYST EXCISION Left 1997 approx    CARPAL TUNNEL RELEASE Right 2012 approx    CATARACT EXTRACTION Bilateral 1994    CHOLECYSTECTOMY  1975    CORNEAL TRANSPLANT Bilateral 08/2015 8/2015 - left; Right 4/2016    EYE SURGERY      Fuch's Corneal dystrophy - had 2nd operation with Dr. Quintanilla    EYE SURGERY      Cataract wIOL    left thumb Left 2011    removed cuboid for arthritis    REVERSE TOTAL SHOULDER ARTHROPLASTY Left 8/21/2018    Procedure: ARTHROPLASTY, SHOULDER, TOTAL, REVERSE;  Surgeon: Solomon Lujan MD;  Location: [facility] OR;  Service: Orthopedics;  Laterality: Left;  WITH BICEP TENODESIS    SKIN CANCER EXCISION Left 2017    BCC removal by Dr. Valadez    SLT- OS (aka TRABECULOPLASTY) Left 05/11/2011    repeat 3/14/12    TENOTOMY Left 8/21/2018    Procedure: TENOTOMY;  Surgeon: Solomon Lujan MD;  Location: [facility] OR;  Service: Orthopedics;  Laterality: Left;    TONSILLECTOMY  1938       Review of patient's allergies indicates:   Allergen Reactions    Claritin [loratadine] Other (See Comments)     Double vision/spots    Hydrocodone-acetaminophen      wheezing    Labetalol Shortness  Of Breath, Nausea Only and Other (See Comments)     dizziness    Naproxen      wheezing    Verapamil Diarrhea    Vibramycin [doxycycline calcium] Other (See Comments)     Weakness nausea   sob    Amlodipine      Myalgias      Coreg [carvedilol] Swelling     lips    Fenofibrate      Causes muscle weakness    Hydralazine analogues      Muscle tremors/aches      Isosorbide     Lasix [furosemide]      myalgias    Moxifloxacin Other (See Comments)     Hives   muscle soreness    Timolol     Adhesive Rash     Clonidine patch - 2 mfg - contact dermatitis    Allegra [fexofenadine] Other (See Comments)     Double vision/spots     Codeine Diarrhea and Other (See Comments)     Loss of balance     Erythromycin Other (See Comments)     Complete weakness/could not walk    Hydrocortisone (bulk) Other (See Comments)     Only suppository/ caused muscle weakness    Macrolide antibiotics Other (See Comments)     Complete weakness/could not walk    Patanol [olopatadine] Other (See Comments)     Muscle weakness    Prednisone Anxiety    Statins-hmg-coa reductase inhibitors Other (See Comments)     Muscle weakness    Xalatan [latanoprost] Other (See Comments)     Muscle weakness       Current Facility-Administered Medications on File Prior to Encounter   Medication    omnipaque 240 iohexol 2 mL    sodium chloride 0.9% flush 3 mL     Current Outpatient Medications on File Prior to Encounter   Medication Sig    acetaminophen (TYLENOL) 500 MG tablet Take 500 mg by mouth daily as needed. Taking 1 to 3    ALPHA LIPOIC ACID ORAL Take 200 mg by mouth.     ALPRAZolam (XANAX) 0.5 MG tablet TAKE 1/2 - 1 TABLET BY MOUTH NIGHTLY FOR SLEEP OR ANXIETY    apixaban (ELIQUIS) 2.5 mg Tab Take 1 tablet (2.5 mg total) by mouth 2 (two) times daily.    B complex-vitamin C-folic acid 0.8 mg Tab Take 1 tablet by mouth once daily.    Boswellia lashawn extract (BOSWELLIA LASHAWN XT, BULK, MISC) by Misc.(Non-Drug; Combo Route) route.     CALCIUM CITRATE ORAL Take by mouth.    cholecalciferol, vitamin D3, (VITAMIN D3) 2,000 unit Cap Take 1 capsule by mouth once daily.    CHROMIUM ORAL Take 200 mg by mouth once daily.    coenzyme Q10 200 mg capsule Take 400 mg by mouth once daily.     fish oil-omega-3 fatty acids 300-1,000 mg capsule Take 2 g by mouth 2 (two) times daily.     hydroCHLOROthiazide (HYDRODIURIL) 12.5 MG Tab Take 12.5 mg by mouth once daily.    losartan (COZAAR) 50 MG tablet Take 1 tablet (50 mg total) by mouth 2 (two) times daily.    LOTEMAX 0.5 % DrpG     POLYETHYLENE GLYCOL 3350 (MIRALAX ORAL) Take by mouth as needed.     pyridoxine, vitamin B6, (B-6) 100 MG Tab Take 100 mg by mouth once daily.     RHOPRESSA 0.02 % Drop     TRAVATAN Z 0.004 % ophthalmic solution PLACE 1 DROP INTO THE LEFT EYE EVERY EVENING.    TURMERIC ORAL Take 500 mg by mouth 2 (two) times daily.    vitamin E 400 UNIT capsule Take 400 Units by mouth once daily.     metoprolol tartrate (LOPRESSOR) 50 MG tablet Take 2 tablets (100 mg total) by mouth 2 (two) times daily.    [DISCONTINUED] diphenhydramine-acetaminophen (TYLENOL PM)  mg Tab Take 1 tablet by mouth nightly as needed.      Family History     Problem Relation (Age of Onset)    Cancer Father (88)    Heart disease Mother    Hypertension Mother        Tobacco Use    Smoking status: Never Smoker    Smokeless tobacco: Never Used    Tobacco comment: history of passive smoking from her ex-   Substance and Sexual Activity    Alcohol use: Yes     Alcohol/week: 2.0 standard drinks     Types: 2 Standard drinks or equivalent per week     Comment: occasional     Drug use: No    Sexual activity: Not Currently     Partners: Male     Birth control/protection: Post-menopausal     Comment: discussed protection for STD's     Review of Systems   Constitution: Positive for malaise/fatigue.   HENT: Negative for hearing loss and hoarse voice.    Eyes: Negative for blurred vision and visual  disturbance.   Cardiovascular: Positive for chest pain, irregular heartbeat and palpitations. Negative for claudication, dyspnea on exertion, leg swelling, near-syncope, orthopnea, paroxysmal nocturnal dyspnea and syncope.   Respiratory: Negative for cough, hemoptysis, shortness of breath, sleep disturbances due to breathing, snoring and wheezing.    Endocrine: Negative for cold intolerance and heat intolerance.   Hematologic/Lymphatic: Bruises/bleeds easily.   Skin: Negative for color change, dry skin and nail changes.   Musculoskeletal: Positive for arthritis and back pain. Negative for joint pain and myalgias.   Gastrointestinal: Negative for bloating, abdominal pain, constipation, nausea and vomiting.   Genitourinary: Negative for dysuria, flank pain, hematuria and hesitancy.   Neurological: Negative for headaches, light-headedness, loss of balance, numbness, paresthesias and weakness.   Psychiatric/Behavioral: Negative for altered mental status.   Allergic/Immunologic: Negative for environmental allergies.     Objective:     Vital Signs (Most Recent):  Temp: 98 °F (36.7 °C) (10/09/19 0739)  Pulse: (!) 111 (10/09/19 0739)  Resp: (!) 27 (10/09/19 0739)  BP: 135/68 (10/09/19 0739)  SpO2: 98 % (10/09/19 0739) Vital Signs (24h Range):  Temp:  [98 °F (36.7 °C)-98.3 °F (36.8 °C)] 98 °F (36.7 °C)  Pulse:  [] 111  Resp:  [16-27] 27  SpO2:  [95 %-99 %] 98 %  BP: (107-145)/(64-80) 135/68     Weight: 78 kg (172 lb)  Body mass index is 29.52 kg/m².    SpO2: 98 %  O2 Device (Oxygen Therapy): room air    No intake or output data in the 24 hours ending 10/09/19 1001    Lines/Drains/Airways     Peripheral Intravenous Line                 Peripheral IV - Single Lumen 10/09/19 0123 18 G Right Hand less than 1 day                Physical Exam   Constitutional: She is oriented to person, place, and time. She appears well-developed and well-nourished. No distress.   HENT:   Head: Normocephalic and atraumatic.   Eyes: Pupils  are equal, round, and reactive to light.   Neck: Normal range of motion and full passive range of motion without pain. Neck supple. No JVD present.   Cardiovascular: S1 normal, S2 normal and intact distal pulses. An irregularly irregular rhythm present. Tachycardia present. PMI is not displaced. Exam reveals no distant heart sounds.   No murmur heard.  Pulses:       Radial pulses are 2+ on the right side, and 2+ on the left side.        Dorsalis pedis pulses are 2+ on the right side, and 2+ on the left side.   Remains in AFIB today, variable rate control   Pulmonary/Chest: Effort normal and breath sounds normal. No accessory muscle usage. No respiratory distress. She has no decreased breath sounds. She has no wheezes. She has no rales.   Abdominal: Soft. Bowel sounds are normal. She exhibits no distension. There is no tenderness.   Musculoskeletal: Normal range of motion. She exhibits no edema.        Right ankle: She exhibits no swelling.        Left ankle: She exhibits no swelling.   Neurological: She is alert and oriented to person, place, and time.   Skin: Skin is warm and dry. She is not diaphoretic. No cyanosis. Nails show no clubbing.   Psychiatric: She has a normal mood and affect. Her speech is normal and behavior is normal. Judgment and thought content normal. Cognition and memory are normal.   Nursing note and vitals reviewed.      Significant Labs:   CMP   Recent Labs   Lab 10/09/19  0203      K 4.3      CO2 22*   *   BUN 37*   CREATININE 1.4   CALCIUM 10.4   PROT 7.4   ALBUMIN 3.9   BILITOT 0.5   ALKPHOS 76   AST 42*   ALT 48*   ANIONGAP 15   ESTGFRAFRICA 39*   EGFRNONAA 34*   , CBC   Recent Labs   Lab 10/09/19  0203   WBC 10.14   HGB 12.1   HCT 37.1      , Troponin   Recent Labs   Lab 10/09/19  0203   TROPONINI 0.033*    and All pertinent lab results from the last 24 hours have been reviewed.    Significant Imaging: Echocardiogram:   2D echo with color flow doppler:   Results  for orders placed or performed during the hospital encounter of 03/15/18   2D echo with color flow doppler   Result Value Ref Range    QEF 55 55 - 65    Diastolic Dysfunction No     Tricuspid Valve Regurgitation MILD     Narrative    Date of Procedure: 03/16/2018        TEST DESCRIPTION       Aorta: The aortic root is normal in size, measuring 2.9 cm at sinotubular junction and 3.1 cm at Sinuses of Valsalva. The proximal ascending aorta is normal in size, measuring 3.2 cm across.     Left Atrium: The left atrial volume index is normal, measuring 15.90 cc/m2.     Left Ventricle: The left ventricle is normal in size, with an end-diastolic diameter of 4.2 cm, and an end-systolic diameter of 2.5 cm. LV wall thickness is normal, with the septum measuring 1.3 cm and the posterior wall measuring 1.6 cm across. Relative   wall thickness was increased at 0.76, and the LV mass index was increased at 152.2 g/m2 consistent with concentric left ventricular hypertrophy. There are no regional wall motion abnormalities. Left ventricular systolic function appears normal. Visually   estimated ejection fraction is 55-60%. The LV Doppler derived stroke volume equals 90.0 ccs.     Diastolic indices: E wave velocity 1.1 m/s, E/A ratio 3.0,  msec., E/e' ratio(avg) 10. Diastolic function is normal.     Right Atrium: The right atrium is normal in size, measuring 4.2 cm in length in the apical view.     Right Ventricle: The right ventricle is normal in size. Global right ventricular systolic function appears normal. Tricuspid annular plane systolic excursion (TAPSE) is 1.3 cm.     Aortic Valve:  The aortic valve is normal in structure. The mean gradient obtained across the aortic valve is 6 mmHg.     Mitral Valve:  The mitral valve is normal in structure. The pressure half time is 54 msec. The calculated mitral valve area is 4.07 cm2.     Tricuspid Valve:  The tricuspid valve is normal in structure. There is mild tricuspid  regurgitation.     Pulmonary Valve:  The pulmonic valve is not well seen.     IVC: IVC is normal in size and collapses > 50% with a sniff, suggesting normal right atrial pressure of 3 mmHg.     Intracavitary: There is no evidence of pericardial effusion, intracavity mass, thrombi, or vegetation.         CONCLUSIONS     1 - Concentric hypertrophy.     2 - No wall motion abnormalities.     3 - Normal left ventricular systolic function (EF 55-60%).     4 - Normal left ventricular diastolic function.     5 - Normal right ventricular systolic function .     6 - Mild tricuspid regurgitation.             This document has been electronically    SIGNED BY: Samy Castillo MD On: 03/16/2018 12:40    and X-Ray: CXR: X-Ray Chest 1 View (CXR): No results found for this visit on 10/09/19.

## 2019-10-09 NOTE — PROGRESS NOTES
Pt seen and examined, chart, EKG reviewed. Pleasant elderly lady, sitting up in bed comfortably, on Cardizem drip, rate controlled at 75 but remains in Afib. On Eliquis. Mildly Pre renal and troponin remain flat.

## 2019-10-10 ENCOUNTER — ANESTHESIA (OUTPATIENT)
Dept: CARDIOLOGY | Facility: HOSPITAL | Age: 84
DRG: 309 | End: 2019-10-10
Payer: MEDICARE

## 2019-10-10 ENCOUNTER — ANESTHESIA EVENT (OUTPATIENT)
Dept: CARDIOLOGY | Facility: HOSPITAL | Age: 84
DRG: 309 | End: 2019-10-10
Payer: MEDICARE

## 2019-10-10 PROBLEM — I48.92 PAROXYSMAL ATRIAL FLUTTER: Status: ACTIVE | Noted: 2019-10-10

## 2019-10-10 PROBLEM — I48.0 PAROXYSMAL ATRIAL FIBRILLATION: Status: ACTIVE | Noted: 2019-10-10

## 2019-10-10 LAB
ANION GAP SERPL CALC-SCNC: 11 MMOL/L (ref 8–16)
AORTIC ATHEROMA: YES
BASOPHILS # BLD AUTO: 0.06 K/UL (ref 0–0.2)
BASOPHILS NFR BLD: 0.6 % (ref 0–1.9)
BUN SERPL-MCNC: 41 MG/DL (ref 8–23)
CALCIUM SERPL-MCNC: 9.6 MG/DL (ref 8.7–10.5)
CHLORIDE SERPL-SCNC: 104 MMOL/L (ref 95–110)
CO2 SERPL-SCNC: 25 MMOL/L (ref 23–29)
CREAT SERPL-MCNC: 1.5 MG/DL (ref 0.5–1.4)
DIASTOLIC DYSFUNCTION: NO
DIFFERENTIAL METHOD: ABNORMAL
EOSINOPHIL # BLD AUTO: 0.2 K/UL (ref 0–0.5)
EOSINOPHIL NFR BLD: 1.7 % (ref 0–8)
ERYTHROCYTE [DISTWIDTH] IN BLOOD BY AUTOMATED COUNT: 13.6 % (ref 11.5–14.5)
EST. GFR  (AFRICAN AMERICAN): 36 ML/MIN/1.73 M^2
EST. GFR  (NON AFRICAN AMERICAN): 31 ML/MIN/1.73 M^2
GLUCOSE SERPL-MCNC: 126 MG/DL (ref 70–110)
HCT VFR BLD AUTO: 34.8 % (ref 37–48.5)
HGB BLD-MCNC: 11 G/DL (ref 12–16)
IMM GRANULOCYTES # BLD AUTO: 0.04 K/UL (ref 0–0.04)
IMM GRANULOCYTES NFR BLD AUTO: 0.4 % (ref 0–0.5)
LYMPHOCYTES # BLD AUTO: 1.6 K/UL (ref 1–4.8)
LYMPHOCYTES NFR BLD: 17.4 % (ref 18–48)
MAGNESIUM SERPL-MCNC: 2.1 MG/DL (ref 1.6–2.6)
MCH RBC QN AUTO: 31.3 PG (ref 27–31)
MCHC RBC AUTO-ENTMCNC: 31.6 G/DL (ref 32–36)
MCV RBC AUTO: 99 FL (ref 82–98)
MITRAL VALVE REGURGITATION: ABNORMAL
MONOCYTES # BLD AUTO: 1 K/UL (ref 0.3–1)
MONOCYTES NFR BLD: 10.4 % (ref 4–15)
NEUTROPHILS # BLD AUTO: 6.4 K/UL (ref 1.8–7.7)
NEUTROPHILS NFR BLD: 69.5 % (ref 38–73)
NRBC BLD-RTO: 0 /100 WBC
PHOSPHATE SERPL-MCNC: 4.5 MG/DL (ref 2.7–4.5)
PLATELET # BLD AUTO: 228 K/UL (ref 150–350)
PMV BLD AUTO: 10.3 FL (ref 9.2–12.9)
POTASSIUM SERPL-SCNC: 4 MMOL/L (ref 3.5–5.1)
RBC # BLD AUTO: 3.52 M/UL (ref 4–5.4)
RETIRED EF AND QEF - SEE NOTES: 60 (ref 55–65)
SODIUM SERPL-SCNC: 140 MMOL/L (ref 136–145)
TRICUSPID VALVE REGURGITATION: ABNORMAL
WBC # BLD AUTO: 9.26 K/UL (ref 3.9–12.7)

## 2019-10-10 PROCEDURE — 93010 ELECTROCARDIOGRAM REPORT: CPT | Mod: HCNC,,, | Performed by: INTERNAL MEDICINE

## 2019-10-10 PROCEDURE — 37000009 HC ANESTHESIA EA ADD 15 MINS: Mod: HCNC | Performed by: INTERNAL MEDICINE

## 2019-10-10 PROCEDURE — 93320 TRANSESOPHAGEAL ECHO: ICD-10-PCS | Mod: 26,HCNC,, | Performed by: INTERNAL MEDICINE

## 2019-10-10 PROCEDURE — 83735 ASSAY OF MAGNESIUM: CPT | Mod: HCNC

## 2019-10-10 PROCEDURE — 37000008 HC ANESTHESIA 1ST 15 MINUTES: Mod: HCNC | Performed by: INTERNAL MEDICINE

## 2019-10-10 PROCEDURE — 80048 BASIC METABOLIC PNL TOTAL CA: CPT | Mod: HCNC

## 2019-10-10 PROCEDURE — 63600175 PHARM REV CODE 636 W HCPCS: Mod: HCNC | Performed by: NURSE ANESTHETIST, CERTIFIED REGISTERED

## 2019-10-10 PROCEDURE — 85025 COMPLETE CBC W/AUTO DIFF WBC: CPT | Mod: HCNC

## 2019-10-10 PROCEDURE — 93312 TRANSESOPHAGEAL ECHO: ICD-10-PCS | Mod: 26,HCNC,, | Performed by: INTERNAL MEDICINE

## 2019-10-10 PROCEDURE — 93320 DOPPLER ECHO COMPLETE: CPT | Performed by: INTERNAL MEDICINE

## 2019-10-10 PROCEDURE — 93005 ELECTROCARDIOGRAM TRACING: CPT | Mod: HCNC

## 2019-10-10 PROCEDURE — 93325 DOPPLER ECHO COLOR FLOW MAPG: CPT | Performed by: INTERNAL MEDICINE

## 2019-10-10 PROCEDURE — 25000003 PHARM REV CODE 250: Mod: HCNC

## 2019-10-10 PROCEDURE — 25000003 PHARM REV CODE 250: Mod: HCNC | Performed by: INTERNAL MEDICINE

## 2019-10-10 PROCEDURE — 93312 ECHO TRANSESOPHAGEAL: CPT | Performed by: INTERNAL MEDICINE

## 2019-10-10 PROCEDURE — 93325 DOPPLER ECHO COLOR FLOW MAPG: CPT | Mod: 26,HCNC,, | Performed by: INTERNAL MEDICINE

## 2019-10-10 PROCEDURE — 21400001 HC TELEMETRY ROOM: Mod: HCNC

## 2019-10-10 PROCEDURE — 93325 TRANSESOPHAGEAL ECHO: ICD-10-PCS | Mod: 26,HCNC,, | Performed by: INTERNAL MEDICINE

## 2019-10-10 PROCEDURE — 84100 ASSAY OF PHOSPHORUS: CPT | Mod: HCNC

## 2019-10-10 PROCEDURE — 99233 SBSQ HOSP IP/OBS HIGH 50: CPT | Mod: HCNC,,, | Performed by: INTERNAL MEDICINE

## 2019-10-10 PROCEDURE — 93312 ECHO TRANSESOPHAGEAL: CPT | Mod: 26,HCNC,, | Performed by: INTERNAL MEDICINE

## 2019-10-10 PROCEDURE — 93010 EKG 12-LEAD: ICD-10-PCS | Mod: HCNC,,, | Performed by: INTERNAL MEDICINE

## 2019-10-10 PROCEDURE — 25000003 PHARM REV CODE 250: Mod: HCNC | Performed by: EMERGENCY MEDICINE

## 2019-10-10 PROCEDURE — 36415 COLL VENOUS BLD VENIPUNCTURE: CPT | Mod: HCNC

## 2019-10-10 PROCEDURE — 99233 PR SUBSEQUENT HOSPITAL CARE,LEVL III: ICD-10-PCS | Mod: HCNC,,, | Performed by: INTERNAL MEDICINE

## 2019-10-10 PROCEDURE — 25000003 PHARM REV CODE 250: Mod: HCNC | Performed by: NURSE ANESTHETIST, CERTIFIED REGISTERED

## 2019-10-10 PROCEDURE — 93320 DOPPLER ECHO COMPLETE: CPT | Mod: 26,HCNC,, | Performed by: INTERNAL MEDICINE

## 2019-10-10 RX ORDER — POLYETHYLENE GLYCOL 3350 17 G/17G
17 POWDER, FOR SOLUTION ORAL DAILY
Status: DISCONTINUED | OUTPATIENT
Start: 2019-10-10 | End: 2019-10-11 | Stop reason: HOSPADM

## 2019-10-10 RX ORDER — PROPOFOL 10 MG/ML
VIAL (ML) INTRAVENOUS
Status: DISCONTINUED | OUTPATIENT
Start: 2019-10-10 | End: 2019-10-10

## 2019-10-10 RX ORDER — SODIUM CHLORIDE 9 MG/ML
INJECTION, SOLUTION INTRAVENOUS CONTINUOUS PRN
Status: DISCONTINUED | OUTPATIENT
Start: 2019-10-10 | End: 2019-10-10

## 2019-10-10 RX ORDER — LIDOCAINE HYDROCHLORIDE 10 MG/ML
INJECTION, SOLUTION EPIDURAL; INFILTRATION; INTRACAUDAL; PERINEURAL
Status: DISCONTINUED | OUTPATIENT
Start: 2019-10-10 | End: 2019-10-10

## 2019-10-10 RX ADMIN — Medication 100 MG: at 08:10

## 2019-10-10 RX ADMIN — METOPROLOL TARTRATE 100 MG: 50 TABLET ORAL at 08:10

## 2019-10-10 RX ADMIN — RENO CAPS 1 CAPSULE: 100; 1.5; 1.7; 20; 10; 1; 150; 5; 6 CAPSULE ORAL at 08:10

## 2019-10-10 RX ADMIN — APIXABAN 5 MG: 2.5 TABLET, FILM COATED ORAL at 08:10

## 2019-10-10 RX ADMIN — TRAVOPROST 1 DROP: 0.04 SOLUTION/ DROPS OPHTHALMIC at 08:10

## 2019-10-10 RX ADMIN — PROPOFOL 80 MG: 10 INJECTION, EMULSION INTRAVENOUS at 03:10

## 2019-10-10 RX ADMIN — ONDANSETRON 8 MG: 8 TABLET, ORALLY DISINTEGRATING ORAL at 12:10

## 2019-10-10 RX ADMIN — Medication 10 MG/HR: at 01:10

## 2019-10-10 RX ADMIN — VITAMIN E CAP 400 UNIT 400 UNITS: 400 CAP at 08:10

## 2019-10-10 RX ADMIN — LIDOCAINE HYDROCHLORIDE 30 MG: 10 INJECTION, SOLUTION EPIDURAL; INFILTRATION; INTRACAUDAL; PERINEURAL at 03:10

## 2019-10-10 RX ADMIN — CALCIUM CITRATE 200 MG (950 MG) TABLET 200 MG: at 08:10

## 2019-10-10 RX ADMIN — POLYETHYLENE GLYCOL (3350) 17 G: 17 POWDER, FOR SOLUTION ORAL at 08:10

## 2019-10-10 RX ADMIN — SODIUM CHLORIDE: 9 INJECTION, SOLUTION INTRAVENOUS at 03:10

## 2019-10-10 NOTE — ASSESSMENT & PLAN NOTE
Continue present regimen  Monitor renal function and electrolytes;   Avoid nephrotoxins  Renal adjust meds.

## 2019-10-10 NOTE — INTERVAL H&P NOTE
The patient has been examined and the H&P has been reviewed:    I concur with the findings and no changes have occurred since H&P was written.     STACEY GUIDED CARDIOVERSION FOR SYMPTOMATIC A FIB/FLUTTER.    Anesthesia/Surgery risks, benefits and alternative options discussed and understood by patient/family.          Active Hospital Problems    Diagnosis  POA    *New onset a-fib [I48.91]  Yes    Chest pain [R07.9]  Yes    Chronic kidney disease, stage 3 [N18.3]  Yes     Chronic    Abnormal ECG [R94.31]  Yes    HTN (hypertension) [I10]  Yes     Chronic     Fluctuating BP with Myalgias        Resolved Hospital Problems   No resolved problems to display.

## 2019-10-10 NOTE — H&P
[facility] Hospital Medicine  History & Physical    Patient Name: Shirley Metz  MRN: [id number]  Admission Date: 10/9/2019  Attending Physician: Dr. Kwok  Primary Care Provider: Yandy Terrell MD         Patient information was obtained from patient and ER records.     Subjective:     Principal Problem:New onset a-fib    Chief Complaint:   Chief Complaint   Patient presents with    Chest Pain     afib RVR on scene 140's        HPI: 85 YO WF presenting with complaints of left sided chest discomfort and palpitations.  She has history of hypertension for several years and complains of recent fluctuations in the BP;  She was recently referred to cardiology Dr. Randhawa with apparent new onset of atrial fibrillation.  She was started on eliquis and asked to follow up in the office;  With the occurrence of chest pain she was told to come in to the hospital for further management;  She is chest pain free at present;      Past Medical History:   Diagnosis Date    Anemia 11/29/2018    Anxiety 11/28/2017    Arthritis     Atrial fibrillation with RVR 10/9/2019    Back pain     Basal cell carcinoma 03/29/2018    left mid back    Colon polyps     reported per patient.     Fuchs' corneal dystrophy     General anesthetics causing adverse effect in therapeutic use     woke up during surgery and gagged on ET tube    Glaucoma     Glaucoma     Heart failure with preserved left ventricular function (HFpEF) 7/24/2018    Hyperlipidemia     Hypertension     Macular degeneration dry    Obesity     Squamous cell carcinoma 01/08/2019    left shoulder    Trouble in sleeping     Urinary tract infection        Past Surgical History:   Procedure Laterality Date    ADENOIDECTOMY  1938    BREAST BIOPSY Left     1997. benign    BREAST CYST EXCISION Left 1997 approx    CARPAL TUNNEL RELEASE Right 2012 approx    CATARACT EXTRACTION Bilateral 1994    CHOLECYSTECTOMY  1975    CORNEAL TRANSPLANT  Bilateral 08/2015 8/2015 - left; Right 4/2016    EYE SURGERY      Fuch's Corneal dystrophy - had 2nd operation with Dr. Quintanilla    EYE SURGERY      Cataract wIOL    left thumb Left 2011    removed cuboid for arthritis    REVERSE TOTAL SHOULDER ARTHROPLASTY Left 8/21/2018    Procedure: ARTHROPLASTY, SHOULDER, TOTAL, REVERSE;  Surgeon: Solomon Lujan MD;  Location: [facility] OR;  Service: Orthopedics;  Laterality: Left;  WITH BICEP TENODESIS    SKIN CANCER EXCISION Left 2017    BCC removal by Dr. Valadez    SLT- OS (aka TRABECULOPLASTY) Left 05/11/2011    repeat 3/14/12    TENOTOMY Left 8/21/2018    Procedure: TENOTOMY;  Surgeon: Solomon Lujan MD;  Location: [facility] OR;  Service: Orthopedics;  Laterality: Left;    TONSILLECTOMY  1938       Review of patient's allergies indicates:   Allergen Reactions    Claritin [loratadine] Other (See Comments)     Double vision/spots    Hydrocodone-acetaminophen      wheezing    Labetalol Shortness Of Breath, Nausea Only and Other (See Comments)     dizziness    Naproxen      wheezing    Verapamil Diarrhea    Vibramycin [doxycycline calcium] Other (See Comments)     Weakness nausea   sob    Amlodipine      Myalgias      Coreg [carvedilol] Swelling     lips    Fenofibrate      Causes muscle weakness    Hydralazine analogues      Muscle tremors/aches      Isosorbide     Lasix [furosemide]      myalgias    Moxifloxacin Other (See Comments)     Hives   muscle soreness    Timolol     Adhesive Rash     Clonidine patch - 2 mfg - contact dermatitis    Allegra [fexofenadine] Other (See Comments)     Double vision/spots     Codeine Diarrhea and Other (See Comments)     Loss of balance     Erythromycin Other (See Comments)     Complete weakness/could not walk    Hydrocortisone (bulk) Other (See Comments)     Only suppository/ caused muscle weakness    Macrolide antibiotics Other (See Comments)     Complete weakness/could not walk    Patanol [olopatadine] Other  (See Comments)     Muscle weakness    Prednisone Anxiety    Statins-hmg-coa reductase inhibitors Other (See Comments)     Muscle weakness    Xalatan [latanoprost] Other (See Comments)     Muscle weakness       Current Facility-Administered Medications on File Prior to Encounter   Medication    sodium chloride 0.9% flush 3 mL     Current Outpatient Medications on File Prior to Encounter   Medication Sig    acetaminophen (TYLENOL) 500 MG tablet Take 500 mg by mouth daily as needed. Taking 1 to 3    ALPHA LIPOIC ACID ORAL Take 200 mg by mouth.     ALPRAZolam (XANAX) 0.5 MG tablet TAKE 1/2 - 1 TABLET BY MOUTH NIGHTLY FOR SLEEP OR ANXIETY    apixaban (ELIQUIS) 2.5 mg Tab Take 1 tablet (2.5 mg total) by mouth 2 (two) times daily.    B complex-vitamin C-folic acid 0.8 mg Tab Take 1 tablet by mouth once daily.    Boswellia lashawn extract (BOSWELLIA LASHAWN XT, BULK, MISC) by Misc.(Non-Drug; Combo Route) route.    CALCIUM CITRATE ORAL Take by mouth.    cholecalciferol, vitamin D3, (VITAMIN D3) 2,000 unit Cap Take 1 capsule by mouth once daily.    CHROMIUM ORAL Take 200 mg by mouth once daily.    coenzyme Q10 200 mg capsule Take 400 mg by mouth once daily.     fish oil-omega-3 fatty acids 300-1,000 mg capsule Take 2 g by mouth 2 (two) times daily.     hydroCHLOROthiazide (HYDRODIURIL) 12.5 MG Tab Take 12.5 mg by mouth once daily.    losartan (COZAAR) 50 MG tablet Take 1 tablet (50 mg total) by mouth 2 (two) times daily.    LOTEMAX 0.5 % DrpG     metoprolol tartrate (LOPRESSOR) 50 MG tablet Take 2 tablets (100 mg total) by mouth 2 (two) times daily.    POLYETHYLENE GLYCOL 3350 (MIRALAX ORAL) Take by mouth as needed.     pyridoxine, vitamin B6, (B-6) 100 MG Tab Take 100 mg by mouth once daily.     RHOPRESSA 0.02 % Drop     TRAVATAN Z 0.004 % ophthalmic solution PLACE 1 DROP INTO THE LEFT EYE EVERY EVENING.    TURMERIC ORAL Take 500 mg by mouth 2 (two) times daily.    vitamin E 400 UNIT capsule Take 400  Units by mouth once daily.     [DISCONTINUED] diphenhydramine-acetaminophen (TYLENOL PM)  mg Tab Take 1 tablet by mouth nightly as needed.      Family History     Problem Relation (Age of Onset)    Cancer Father (88)    Heart disease Mother    Hypertension Mother        Tobacco Use    Smoking status: Never Smoker    Smokeless tobacco: Never Used    Tobacco comment: history of passive smoking from her ex-   Substance and Sexual Activity    Alcohol use: Yes     Alcohol/week: 2.0 standard drinks     Types: 2 Standard drinks or equivalent per week     Comment: occasional     Drug use: No    Sexual activity: Not Currently     Partners: Male     Birth control/protection: Post-menopausal     Comment: discussed protection for STD's     Review of Systems   Constitutional: Negative for activity change and appetite change.   HENT: Negative.    Eyes: Negative for discharge and itching.   Respiratory: Negative for apnea and shortness of breath.    Cardiovascular: Positive for palpitations. Negative for chest pain and leg swelling.   Gastrointestinal: Negative for abdominal distention.   Endocrine: Negative for cold intolerance and heat intolerance.   Genitourinary: Negative for difficulty urinating.   Musculoskeletal: Negative.    Skin: Negative.    Allergic/Immunologic: Negative.    Neurological: Negative for dizziness, weakness and light-headedness.   Hematological: Negative for adenopathy. Does not bruise/bleed easily.   Psychiatric/Behavioral: Negative for agitation and behavioral problems.     Objective:     Vital Signs (Most Recent):  Temp: 98 °F (36.7 °C) (10/09/19 0739)  Pulse: (!) 111 (10/09/19 0739)  Resp: (!) 27 (10/09/19 0739)  BP: 135/68 (10/09/19 0739)  SpO2: 98 % (10/09/19 0739) Vital Signs (24h Range):  Temp:  [98 °F (36.7 °C)-98.3 °F (36.8 °C)] 98 °F (36.7 °C)  Pulse:  [] 111  Resp:  [16-27] 27  SpO2:  [95 %-99 %] 98 %  BP: (107-145)/(64-80) 135/68     Weight: 78 kg (172 lb)  Body mass  index is 29.52 kg/m².    Physical Exam   Constitutional: She is oriented to person, place, and time. She appears well-developed and well-nourished.   HENT:   Head: Normocephalic and atraumatic.   Eyes: Pupils are equal, round, and reactive to light. EOM are normal.   Neck: Normal range of motion.   Cardiovascular: Normal rate.   Irreg; rate controlled   Pulmonary/Chest: Effort normal and breath sounds normal.   Abdominal: Soft. Bowel sounds are normal.   Genitourinary:   Genitourinary Comments: Deferred   Neurological: She is alert and oriented to person, place, and time.   Skin: Skin is warm and dry.   Psychiatric: She has a normal mood and affect. Her behavior is normal. Judgment and thought content normal.   Nursing note and vitals reviewed.        CRANIAL NERVES     CN III, IV, VI   Pupils are equal, round, and reactive to light.  Extraocular motions are normal.        Significant Labs:   BMP:   Recent Labs   Lab 10/09/19  0203 10/09/19  1003   *  --      --    K 4.3  --      --    CO2 22*  --    BUN 37*  --    CREATININE 1.4  --    CALCIUM 10.4  --    MG  --  2.1     CBC:   Recent Labs   Lab 10/09/19  0203   WBC 10.14   HGB 12.1   HCT 37.1        CMP:   Recent Labs   Lab 10/09/19  0203      K 4.3      CO2 22*   *   BUN 37*   CREATININE 1.4   CALCIUM 10.4   PROT 7.4   ALBUMIN 3.9   BILITOT 0.5   ALKPHOS 76   AST 42*   ALT 48*   ANIONGAP 15   EGFRNONAA 34*     Coagulation: No results for input(s): PT, INR, APTT in the last 48 hours.  Lipid Panel: No results for input(s): CHOL, HDL, LDLCALC, TRIG, CHOLHDL in the last 48 hours.  Troponin:   Recent Labs   Lab 10/09/19  0203 10/09/19  1003 10/09/19  1357   TROPONINI 0.033* 0.027* 0.035*       Significant Imaging:   Imaging Results          X-Ray Chest AP Portable (Final result)  Result time 10/09/19 08:23:30    Final result by Victor M Lopez MD (10/09/19 08:23:30)                 Impression:      No acute  abnormality.      Electronically signed by: Victor M Lopez  Date:    10/09/2019  Time:    08:23             Narrative:    EXAMINATION:  XR CHEST AP PORTABLE    CLINICAL HISTORY:  Chest Pain;.    TECHNIQUE:  Single frontal portable view of the chest was performed.    COMPARISON:  03/15/2018    FINDINGS:  Support devices: Telemetry leads    The lungs are clear, with normal appearance of pulmonary vasculature and no pleural effusion or pneumothorax.    Unchanged cardiomegaly.    Left total shoulder arthroplasty.                              Assessment/Plan:     * New onset a-fib  Continue cardizerm drip  Cycle cardiac enzymes  Cardiology to see      Chest pain  Cardiac monitoring for ischemia  Cycle cardiac enzymes  cardiolgy to see.      Chronic kidney disease, stage 3  Continue present regimen  Monitor renal function and electrolytes;   Avoid nephrotoxins  Renal adjust meds.      HTN (hypertension)  Continue present regimen with adjustment based on clinical course        VTE Risk Mitigation (From admission, onward)         Ordered     apixaban tablet 2.5 mg  2 times daily      10/09/19 0910                   Patrick Morrissey MD  Department of Hospital Medicine   [facility]

## 2019-10-10 NOTE — HOSPITAL COURSE
Pleasant elderly lady, hx of HTN, sitting up in bed comfortably, on Cardizem drip, rate controlled at 75 but remains in Afib. On Eliquis. Mildly Pre renal and troponin remains flat.  10/10- looks comfortable, Nuclear stress test Negative, about to get STACEY/DCCV by Dr. Durán.    10/11- pt underwent STACEY which showed LA Thrombus-- hence no cardioversion. Pt is already on Eliquis and her HR is controlled. She was cleared by Dr. Durán for discharge and may undergo cardioversion in 4-6 weeks if needed and if there is no thrombus seen. She is doing well, eating drinking well. She was seen and examined and deemed stable for discharge home today.

## 2019-10-10 NOTE — ASSESSMENT & PLAN NOTE
Continue cardizerm drip  Cycle cardiac enzymes  Cardiology to see    Getting STACEY/DCCV today-

## 2019-10-10 NOTE — PLAN OF CARE
POC reviewed, verbalized understanding. Pt remained free from falls, fall precautions in place. Pt is a fib on monitor. VSS. No other c/o at this time. PIV intact, infusing cardizem. Call bell and personal belongings within reach. Hourly rounding complete. Reminded to call for assistance. Will continue to monitor.

## 2019-10-10 NOTE — PLAN OF CARE
CM attempted bedside discharge planning assessment.  Patient not in the room at this time.  Medical record review completed and CM will meet with patient at a later date/time.     10/10/19 6094   Discharge Assessment   Assessment Type Discharge Planning Assessment   Assessment information obtained from? Medical Record   Expected Length of Stay (days)   (1-2)   Prior to hospitilization cognitive status: Alert/Oriented   Readmission Within the Last 30 Days no previous admission in last 30 days   Patient/Family in Agreement with Plan unable to assess

## 2019-10-10 NOTE — TRANSFER OF CARE
"Anesthesia Transfer of Care Note    Patient: Shirley Metz    Procedure(s) Performed: Procedure(s) (LRB):  ECHOCARDIOGRAM,TRANSESOPHAGEAL (N/A)  CARDIOVERSION (N/A)    Patient location: Cath Lab    Anesthesia Type: MAC    Post pain: adequate analgesia    Post assessment: no apparent anesthetic complications and tolerated procedure well    Post vital signs: stable    Level of consciousness: awake, alert and oriented    Nausea/Vomiting: no nausea/vomiting    Complications: none    Transfer of care protocol was followed      Last vitals:   Visit Vitals  /62   Pulse 84   Temp 36.8 °C (98.2 °F)   Resp 18   Ht 5' 4" (1.626 m)   Wt 79.9 kg (176 lb 2.4 oz)   SpO2 (!) 92%   Breastfeeding? No   BMI 30.24 kg/m²     "

## 2019-10-10 NOTE — ASSESSMENT & PLAN NOTE
Presented to ED due to chest pain and found to be in AFIB, variable rate control  Chest pain resolved after receiving IV diltiazem  Troponin 0.033, continue to trend serial troponin  ECHO pending    10/10/19  -Resolved  -Likely secondary to afib with RVR  -Troponin slightly elevated but flat  -2D echo showed normal EF  -Consider MPI stress test as OP

## 2019-10-10 NOTE — PHYSICIAN QUERY
PT Name: Shirley Metz  MR #: [id number]    Physician Query Form - Atrial Fibrillation Specificity     CDS/: Chelsea Davis               Contact information:[email or web address]     This form is a permanent document in the medical record.     Query Date: October 10, 2019    By submitting this query, we are merely seeking further clarification of documentation. Please utilize your independent clinical judgment when addressing the question(s) below.    The medical record contains the following:   Indicators     Supporting Clinical Findings Location in Medical Record   x Atrial Fibrillation New onset a-fib       H&P   x EKG results EKG revealed AFIB/AFL, variable rate control    Cardiology cn 10-9   x Medication Continue cardizerm drip     H&P    Treatment      Other         Provider, please further specify the Atrial Fibrillation diagnosis.    [xx  ] Paroxysmal   [  ] Other (please specify):   [  ] Clinically Undetermined       Please document in your progress notes daily for the duration of treatment until resolved, and include in your discharge summary.

## 2019-10-10 NOTE — ANESTHESIA PREPROCEDURE EVALUATION
10/10/2019  Shirley Metz is a 86 y.o., female.    Anesthesia Evaluation    I have reviewed the Patient Summary Reports.    I have reviewed the Nursing Notes.   I have reviewed the Medications.     Review of Systems  Anesthesia Hx:  No problems with previous Anesthesia Hx of Anesthetic complications  Neg history of prior surgery. Denies Family Hx of Anesthesia complications.   Denies Personal Hx of Anesthesia complications.   Social:  Non-Smoker, No Alcohol Use    Hematology/Oncology:  Hematology Normal   Oncology Normal     EENT/Dental:EENT/Dental Normal   Cardiovascular:   Exercise tolerance: good Denies Pacemaker.  Denies Hypertension. Dysrhythmias atrial fibrillation CHF NYHA Classification I ECG has been reviewed.    Pulmonary:  Pulmonary Normal    Renal/:   Chronic Renal Disease    Hepatic/GI:  Hepatic/GI Normal    Musculoskeletal:   Arthritis     Neurological:   Neuromuscular Disease,    Endocrine:  Endocrine Normal    Psych:  Psychiatric Normal           Physical Exam  General:  Obesity    Airway/Jaw/Neck:  Airway Findings: Mouth Opening: Normal Tongue: Normal  Mallampati: I  Improves to II with phonation.  TM Distance: Normal, at least 6 cm     Eyes/Ears/Nose:  EYES/EARS/NOSE FINDINGS: Normal   Dental:  DENTAL FINDINGS: Normal   Chest/Lungs:  Chest/Lungs Findings: Clear to auscultation, Normal Respiratory Rate     Heart/Vascular:  Heart Findings: Rate: Tachycardia  Rhythm: Irregularly Irregular  Sounds: Normal     Abdomen:  Abdomen Findings:  Normal, Soft     Musculoskeletal:  Musculoskeletal Findings: Normal   Skin:  Skin Findings: Normal    Mental Status:  Mental Status Findings: Normal        Anesthesia Plan  Type of Anesthesia, risks & benefits discussed:  Anesthesia Type:  MAC  Patient's Preference:   Intra-op Monitoring Plan: standard ASA monitors  Intra-op Monitoring Plan  Comments:   Post Op Pain Control Plan:   Post Op Pain Control Plan Comments:   Induction:   IV  Beta Blocker:         Informed Consent: Patient understands risks and agrees with Anesthesia plan.  Questions answered. Anesthesia consent signed with patient.  ASA Score: 4     Day of Surgery Review of History & Physical: I have interviewed and examined the patient. I have reviewed the patient's H&P dated:  There are no significant changes.          Ready For Surgery From Anesthesia Perspective.

## 2019-10-10 NOTE — ASSESSMENT & PLAN NOTE
Patient presented to ED due to chest pain and palpitations which occurred suddenly PTA  EKG revealed AFIB/AFL, variable rate control  Received IV Diltiazem bolus then IV Cardizem gtt started in ED  Received Lovenox 1 mg/kg SQ x 1 dose this AM in ED  Resume home Eliquis 2.5 mg BID for CVA prophylaxis  Increase Cardizem gtt to 15 mg/hr today  ECHO pending  NO CNS complaints to suggest TIA or CVA today  NO signs of abnormal bleeding  Repeat EKG in AM  Further recs to follow.     Discussed with patient in detail per Dr. Durán regarding risks, benefits and treatment alternatives regarding Eliquis for CVA prophylaxis. Patient agrees to proceed with Eliquis at this time.     10/10/19  -Remains in afib with variable rate  -Continue Cardizem gtt  -Continue BB  -Continue Eliquis  -STACEY/DCCV planned today by Dr. Durán. All risks, benefits, and treatment alternatives explained to patient in detail. All questions answered. She has agreed to proceed.

## 2019-10-10 NOTE — HOSPITAL COURSE
10/10/19-Patient seen and examined today, sitting up on couch. Seems to feel ok. Admits to palpitations and some nausea overnight. No chest pain or SOB. Labs reviewed and are stable. 2D echo showed normal EF. STACEY/DCCV planned for today.    10/11/19-Patient seen and examined today, sitting up in bed. STACEY yesterday showed LA thrombus, so no DCCV performed. Feels well. No chest pain or SOB. Remains in afib but rate controlled. Labs stable, creatinine 1.6.

## 2019-10-10 NOTE — ASSESSMENT & PLAN NOTE
Cardiac monitoring for ischemia  Cycle cardiac enzymes  cardiolgy to see.      Sec to Afib w RVR- no cardiac ischemia

## 2019-10-10 NOTE — PROGRESS NOTES
[facility] -   Cardiology  Progress Note    Patient Name: Shirley Metz  MRN: [id number]  Admission Date: 10/9/2019  Hospital Length of Stay: 1 days  Code Status: Full Code   Attending Physician: Emanuel Kwok MD   Primary Care Physician: Yandy Terrell MD  Expected Discharge Date:   Principal Problem:New onset a-fib    Subjective:   HPI:  Shirley Metz is a 86 year old female who presented to [facility] due to chest pain radiation down her left side which started last night PTA. She was recently seen by Dr. Castillo and started on Eliquis and her medications were adjusted. Her current medical conditions include Anemia, Basal cell carcinoma, HTN, HLP, AFIB. Of note, she has numerous intolerances with multiple cardiac medications. ED workup revealed , K+ 4.3, Cr 1.4, Troponin 0.033, EKG revealed AFL/AFIB, variable rate control. She received IV Diltiazem in ED and was started on IV Diltiazem drip. She also received Lovenox 1mg/kg this AM as well. Cardiology consulted to assist with medical management. Chart reviewed, Patient seen and examined in ED. Denies chest pain on exam today. She remains in AFIB, variable rate control at time of exam today. No shortness of breath, RUIZ or palpitations. NO leg swelling or claudications. Denies orthopnea, PND or abdominal bloating today. Will adjust Cardizem gtt to 15 mg/hr today for better rate control and give an additional bolus today. ECHO pending. Further recs to follow.    Hospital Course:   10/10/19-Patient seen and examined today, sitting up on couch. Seems to feel ok. Admits to palpitations and some nausea overnight. No chest pain or SOB. Labs reviewed and are stable. 2D echo showed normal EF. STACEY/DCCV planned for today.        Review of Systems   Constitution: Negative.   HENT: Negative.    Eyes: Negative.    Cardiovascular: Positive for palpitations.   Respiratory: Negative.    Endocrine: Negative.    Hematologic/Lymphatic: Negative.     Skin: Negative.    Musculoskeletal: Negative.    Gastrointestinal: Positive for nausea.   Genitourinary: Negative.    Neurological: Negative.    Psychiatric/Behavioral: Negative.    Allergic/Immunologic: Negative.      Objective:     Vital Signs (Most Recent):  Temp: 98.2 °F (36.8 °C) (10/10/19 1113)  Pulse: 84 (10/10/19 1113)  Resp: 18 (10/10/19 1113)  BP: 139/62 (10/10/19 1113)  SpO2: (!) 92 % (10/10/19 1113) Vital Signs (24h Range):  Temp:  [98.1 °F (36.7 °C)-98.8 °F (37.1 °C)] 98.2 °F (36.8 °C)  Pulse:  [59-86] 84  Resp:  [16-18] 18  SpO2:  [92 %-97 %] 92 %  BP: (103-139)/(58-67) 139/62     Weight: 79.9 kg (176 lb 2.4 oz)  Body mass index is 30.24 kg/m².     SpO2: (!) 92 %  O2 Device (Oxygen Therapy): room air      Intake/Output Summary (Last 24 hours) at 10/10/2019 1354  Last data filed at 10/10/2019 0617  Gross per 24 hour   Intake 360 ml   Output 75 ml   Net 285 ml       Lines/Drains/Airways     Peripheral Intravenous Line                 Peripheral IV - Single Lumen 10/09/19 1522 20 G Anterior;Left;Proximal Forearm less than 1 day                Physical Exam   Constitutional: She is oriented to person, place, and time. She appears well-developed and well-nourished. No distress.   HENT:   Head: Normocephalic and atraumatic.   Eyes: Pupils are equal, round, and reactive to light. Right eye exhibits no discharge. Left eye exhibits no discharge.   Neck: Neck supple. No JVD present.   Cardiovascular: Normal rate, regular rhythm, S1 normal, S2 normal and normal heart sounds.   No murmur heard.  Pulmonary/Chest: Effort normal and breath sounds normal. No respiratory distress. She has no wheezes. She has no rales.   Abdominal: Soft. She exhibits no distension.   Musculoskeletal: She exhibits no edema.   Neurological: She is alert and oriented to person, place, and time.   Skin: Skin is warm and dry. She is not diaphoretic. No erythema.   Psychiatric: She has a normal mood and affect. Her behavior is normal.  Thought content normal.   Nursing note and vitals reviewed.      Significant Labs:   CMP   Recent Labs   Lab 10/09/19  0203 10/10/19  0422    140   K 4.3 4.0    104   CO2 22* 25   * 126*   BUN 37* 41*   CREATININE 1.4 1.5*   CALCIUM 10.4 9.6   PROT 7.4  --    ALBUMIN 3.9  --    BILITOT 0.5  --    ALKPHOS 76  --    AST 42*  --    ALT 48*  --    ANIONGAP 15 11   ESTGFRAFRICA 39* 36*   EGFRNONAA 34* 31*   , CBC   Recent Labs   Lab 10/09/19  0203 10/10/19  0422   WBC 10.14 9.26   HGB 12.1 11.0*   HCT 37.1 34.8*    228   , Troponin   Recent Labs   Lab 10/09/19  0203 10/09/19  1003 10/09/19  1357   TROPONINI 0.033* 0.027* 0.035*    and All pertinent lab results from the last 24 hours have been reviewed.    Significant Imaging: Echocardiogram:   2D echo with color flow doppler:   Results for orders placed or performed during the hospital encounter of 10/09/19   2D echo with color flow doppler   Result Value Ref Range    QEF 55 55 - 65    Mitral Valve Regurgitation MILD TO MODERATE     Diastolic Dysfunction No     Est. PA Systolic Pressure 40.04 (A)     Pericardial Effusion TRIVIAL     Tricuspid Valve Regurgitation MILD     Narrative    Date of Procedure: 10/09/2019        TEST DESCRIPTION   Technical Quality: This is a technically challenging study. There is poor endocardial definition.     General: The patient was in an irregularly irregular rhythm throughout the study.     Aorta: The aortic root is normal in size, measuring 2.9 cm at sinotubular junction and 3.4 cm at Sinuses of Valsalva. The proximal ascending aorta is normal in size, measuring 2.9 cm across.     Left Atrium: The left atrial volume index is normal, measuring 22.02 cc/m2.     Left Ventricle: The left ventricle is normal in size, with an end-diastolic diameter of 3.8 cm, and an end-systolic diameter of 2.8 cm. Wall thickness is increased, with the septum measuring 1.7 cm and the posterior wall measuring 1.6 cm across. Relative    wall thickness was increased at 0.84, and the LV mass index was increased at 164.8 g/m2 consistent with concentric left ventricular hypertrophy. There are no regional wall motion abnormalities. Left ventricular systolic function appears normal. Visually   estimated ejection fraction is 55-60%. The LV Doppler derived stroke volume equals 60.0 ccs.     Diastolic indices: E wave velocity 1.0 m/s, E/A ratio 4.8,  msec., E/e' ratio(avg) 8. Diastolic function is normal.     Right Atrium: The right atrium is normal in size, measuring 3.9 cm in length in the apical view.     Right Ventricle: The right ventricle is normal in size. Global right ventricular systolic function appears normal. Tricuspid annular plane systolic excursion (TAPSE) is 1.5 cm. The estimated PA systolic pressure is 40 mmHg.     Aortic Valve:  The aortic valve is normal in structure. The mean gradient obtained across the aortic valve is 7 mmHg.     Mitral Valve:  The mitral valve is mildly sclerotic. The pressure half time is 62 msec. The calculated mitral valve area is 3.55 cm2. There is mild to moderate mitral regurgitation. There is mitral annular calcification.     Tricuspid Valve:  The tricuspid valve is normal in structure. There is mild tricuspid regurgitation.     Pulmonary Valve:  The pulmonic valve is not well seen.     Pericardium: There is evidence of a trivial pericardial effusion.     IVC: IVC is enlarged but collapses > 50% with a sniff, suggesting intermediate right atrial pressure of 8 mmHg.     Intracavitary: There is no evidence of intracavity mass, thrombi, or vegetation.         CONCLUSIONS     1 - Normal left ventricular systolic function (EF 55-60%).     2 - Normal left ventricular diastolic function.     3 - Normal right ventricular systolic function .     4 - Mild to moderate mitral regurgitation.     5 - Concentric hypertrophy.             This document has been electronically    SIGNED BY: Pete Durán MD On: 10/09/2019  20:00   , EKG: Reviewed and X-Ray: CXR: X-Ray Chest 1 View (CXR): No results found for this visit on 10/09/19. and X-Ray Chest PA and Lateral (CXR): No results found for this visit on 10/09/19.    Assessment and Plan:   Patient who presents with new onset symptomatic afib with RVR. Continue same meds/mgmt. STACEY/DCCV planned for today.    * New onset a-fib  Patient presented to ED due to chest pain and palpitations which occurred suddenly PTA  EKG revealed AFIB/AFL, variable rate control  Received IV Diltiazem bolus then IV Cardizem gtt started in ED  Received Lovenox 1 mg/kg SQ x 1 dose this AM in ED  Resume home Eliquis 2.5 mg BID for CVA prophylaxis  Increase Cardizem gtt to 15 mg/hr today  ECHO pending  NO CNS complaints to suggest TIA or CVA today  NO signs of abnormal bleeding  Repeat EKG in AM  Further recs to follow.     Discussed with patient in detail per Dr. Durán regarding risks, benefits and treatment alternatives regarding Eliquis for CVA prophylaxis. Patient agrees to proceed with Eliquis at this time.     10/10/19  -Remains in afib with variable rate  -Continue Cardizem gtt  -Continue BB  -Continue Eliquis  -STACEY/DCCV planned today by Dr. Durán. All risks, benefits, and treatment alternatives explained to patient in detail. All questions answered. She has agreed to proceed.    Chest pain  Presented to ED due to chest pain and found to be in AFIB, variable rate control  Chest pain resolved after receiving IV diltiazem  Troponin 0.033, continue to trend serial troponin  ECHO pending    10/10/19  -Resolved  -Likely secondary to afib with RVR  -Troponin slightly elevated but flat  -2D echo showed normal EF  -Consider MPI stress test as OP    Chronic kidney disease, stage 3  -Creatinine stable, monitor    HTN (hypertension)  -Continue Diltiazem gtt today  -Continue BB        VTE Risk Mitigation (From admission, onward)         Ordered     apixaban tablet 5 mg  2 times daily      10/10/19 1292                 Jo-Ann Jacobo PA-C  Cardiology  [facility]

## 2019-10-10 NOTE — SUBJECTIVE & OBJECTIVE
Review of Systems   Constitution: Negative.   HENT: Negative.    Eyes: Negative.    Cardiovascular: Positive for palpitations.   Respiratory: Negative.    Endocrine: Negative.    Hematologic/Lymphatic: Negative.    Skin: Negative.    Musculoskeletal: Negative.    Gastrointestinal: Positive for nausea.   Genitourinary: Negative.    Neurological: Negative.    Psychiatric/Behavioral: Negative.    Allergic/Immunologic: Negative.      Objective:     Vital Signs (Most Recent):  Temp: 98.2 °F (36.8 °C) (10/10/19 1113)  Pulse: 84 (10/10/19 1113)  Resp: 18 (10/10/19 1113)  BP: 139/62 (10/10/19 1113)  SpO2: (!) 92 % (10/10/19 1113) Vital Signs (24h Range):  Temp:  [98.1 °F (36.7 °C)-98.8 °F (37.1 °C)] 98.2 °F (36.8 °C)  Pulse:  [59-86] 84  Resp:  [16-18] 18  SpO2:  [92 %-97 %] 92 %  BP: (103-139)/(58-67) 139/62     Weight: 79.9 kg (176 lb 2.4 oz)  Body mass index is 30.24 kg/m².     SpO2: (!) 92 %  O2 Device (Oxygen Therapy): room air      Intake/Output Summary (Last 24 hours) at 10/10/2019 1354  Last data filed at 10/10/2019 0617  Gross per 24 hour   Intake 360 ml   Output 75 ml   Net 285 ml       Lines/Drains/Airways     Peripheral Intravenous Line                 Peripheral IV - Single Lumen 10/09/19 1522 20 G Anterior;Left;Proximal Forearm less than 1 day                Physical Exam   Constitutional: She is oriented to person, place, and time. She appears well-developed and well-nourished. No distress.   HENT:   Head: Normocephalic and atraumatic.   Eyes: Pupils are equal, round, and reactive to light. Right eye exhibits no discharge. Left eye exhibits no discharge.   Neck: Neck supple. No JVD present.   Cardiovascular: Normal rate, regular rhythm, S1 normal, S2 normal and normal heart sounds.   No murmur heard.  Pulmonary/Chest: Effort normal and breath sounds normal. No respiratory distress. She has no wheezes. She has no rales.   Abdominal: Soft. She exhibits no distension.   Musculoskeletal: She exhibits no  edema.   Neurological: She is alert and oriented to person, place, and time.   Skin: Skin is warm and dry. She is not diaphoretic. No erythema.   Psychiatric: She has a normal mood and affect. Her behavior is normal. Thought content normal.   Nursing note and vitals reviewed.      Significant Labs:   CMP   Recent Labs   Lab 10/09/19  0203 10/10/19  0422    140   K 4.3 4.0    104   CO2 22* 25   * 126*   BUN 37* 41*   CREATININE 1.4 1.5*   CALCIUM 10.4 9.6   PROT 7.4  --    ALBUMIN 3.9  --    BILITOT 0.5  --    ALKPHOS 76  --    AST 42*  --    ALT 48*  --    ANIONGAP 15 11   ESTGFRAFRICA 39* 36*   EGFRNONAA 34* 31*   , CBC   Recent Labs   Lab 10/09/19  0203 10/10/19  0422   WBC 10.14 9.26   HGB 12.1 11.0*   HCT 37.1 34.8*    228   , Troponin   Recent Labs   Lab 10/09/19  0203 10/09/19  1003 10/09/19  1357   TROPONINI 0.033* 0.027* 0.035*    and All pertinent lab results from the last 24 hours have been reviewed.    Significant Imaging: Echocardiogram:   2D echo with color flow doppler:   Results for orders placed or performed during the hospital encounter of 10/09/19   2D echo with color flow doppler   Result Value Ref Range    QEF 55 55 - 65    Mitral Valve Regurgitation MILD TO MODERATE     Diastolic Dysfunction No     Est. PA Systolic Pressure 40.04 (A)     Pericardial Effusion TRIVIAL     Tricuspid Valve Regurgitation MILD     Narrative    Date of Procedure: 10/09/2019        TEST DESCRIPTION   Technical Quality: This is a technically challenging study. There is poor endocardial definition.     General: The patient was in an irregularly irregular rhythm throughout the study.     Aorta: The aortic root is normal in size, measuring 2.9 cm at sinotubular junction and 3.4 cm at Sinuses of Valsalva. The proximal ascending aorta is normal in size, measuring 2.9 cm across.     Left Atrium: The left atrial volume index is normal, measuring 22.02 cc/m2.     Left Ventricle: The left ventricle is  normal in size, with an end-diastolic diameter of 3.8 cm, and an end-systolic diameter of 2.8 cm. Wall thickness is increased, with the septum measuring 1.7 cm and the posterior wall measuring 1.6 cm across. Relative   wall thickness was increased at 0.84, and the LV mass index was increased at 164.8 g/m2 consistent with concentric left ventricular hypertrophy. There are no regional wall motion abnormalities. Left ventricular systolic function appears normal. Visually   estimated ejection fraction is 55-60%. The LV Doppler derived stroke volume equals 60.0 ccs.     Diastolic indices: E wave velocity 1.0 m/s, E/A ratio 4.8,  msec., E/e' ratio(avg) 8. Diastolic function is normal.     Right Atrium: The right atrium is normal in size, measuring 3.9 cm in length in the apical view.     Right Ventricle: The right ventricle is normal in size. Global right ventricular systolic function appears normal. Tricuspid annular plane systolic excursion (TAPSE) is 1.5 cm. The estimated PA systolic pressure is 40 mmHg.     Aortic Valve:  The aortic valve is normal in structure. The mean gradient obtained across the aortic valve is 7 mmHg.     Mitral Valve:  The mitral valve is mildly sclerotic. The pressure half time is 62 msec. The calculated mitral valve area is 3.55 cm2. There is mild to moderate mitral regurgitation. There is mitral annular calcification.     Tricuspid Valve:  The tricuspid valve is normal in structure. There is mild tricuspid regurgitation.     Pulmonary Valve:  The pulmonic valve is not well seen.     Pericardium: There is evidence of a trivial pericardial effusion.     IVC: IVC is enlarged but collapses > 50% with a sniff, suggesting intermediate right atrial pressure of 8 mmHg.     Intracavitary: There is no evidence of intracavity mass, thrombi, or vegetation.         CONCLUSIONS     1 - Normal left ventricular systolic function (EF 55-60%).     2 - Normal left ventricular diastolic function.     3 -  Normal right ventricular systolic function .     4 - Mild to moderate mitral regurgitation.     5 - Concentric hypertrophy.             This document has been electronically    SIGNED BY: Pete Durán MD On: 10/09/2019 20:00   , EKG: Reviewed and X-Ray: CXR: X-Ray Chest 1 View (CXR): No results found for this visit on 10/09/19. and X-Ray Chest PA and Lateral (CXR): No results found for this visit on 10/09/19.

## 2019-10-10 NOTE — OP NOTE
[facility] -   Cardiac   Procedure Note    SUMMARY     Shirley Metz  [id number]  Yanyd Terrell MD    Date of Procedure: 10/10/2019    Procedure:  STACEY    Provider: Pete Durán MD    Indications: She was referred for STACEY for a fib cardioversion.    Pre-Procedure Diagnosis: Atrial Fibrillation    Post-Procedure Diagnosis:  Left atrial appendage thrombus    Anesthesia: Monitor Anesthesia Care    Description of the Findings of the Procedure:     The risks, benefits, complications, treatment options, and expected outcomes were discussed with the patient. The patient and/or family concurred with the proposed plan, giving informed consent. Patient was brought to the cath lab after IV hydration was begun and oral premedication was given.    Findings:  Left atrial appendage thrombus  Normal LVEF  See report for full details.    Complications: None; patient tolerated the procedure well.    Estimated Blood Loss (EBL): None           Implants: None    Specimens: None    Condition: stable    Disposition: PACU - hemodynamically stable.    Attestation: I was present and scrubbed for the entire procedure.     Recommendations:    Usual post STACEY care.  Continue anticoagulation with Eliqus and consider repeat STACEY guided cardioversion at later date.

## 2019-10-10 NOTE — HPI
85 YO WF presenting with complaints of left sided chest discomfort and palpitations.  She has history of hypertension for several years and complains of recent fluctuations in the BP;  She was recently referred to cardiology Dr. Randhawa with apparent new onset of atrial fibrillation.  She was started on eliquis and asked to follow up in the office;  With the occurrence of chest pain she was told to come in to the hospital for further management;  She is chest pain free at present;

## 2019-10-10 NOTE — SUBJECTIVE & OBJECTIVE
Interval History: looks comfortable, Nuclear stress test Negative, about to get STACEY/DCCV by Dr. Durán.       Review of Systems   Constitutional: Negative for activity change and appetite change.   HENT: Negative.    Eyes: Negative for discharge and itching.   Respiratory: Negative for apnea and shortness of breath.    Cardiovascular: Positive for palpitations. Negative for chest pain and leg swelling.   Gastrointestinal: Negative for abdominal distention.   Endocrine: Negative for cold intolerance and heat intolerance.   Genitourinary: Negative for difficulty urinating.   Musculoskeletal: Negative.    Skin: Negative.    Allergic/Immunologic: Negative.    Neurological: Negative for dizziness, weakness and light-headedness.   Hematological: Negative for adenopathy. Does not bruise/bleed easily.   Psychiatric/Behavioral: Negative for agitation and behavioral problems.     Objective:     Vital Signs (Most Recent):  Temp: 98.2 °F (36.8 °C) (10/10/19 1113)  Pulse: 84 (10/10/19 1113)  Resp: 18 (10/10/19 1113)  BP: 139/62 (10/10/19 1113)  SpO2: (!) 92 % (10/10/19 1113) Vital Signs (24h Range):  Temp:  [98.1 °F (36.7 °C)-98.8 °F (37.1 °C)] 98.2 °F (36.8 °C)  Pulse:  [59-86] 84  Resp:  [16-18] 18  SpO2:  [92 %-97 %] 92 %  BP: (103-139)/(58-67) 139/62     Weight: 79.9 kg (176 lb 2.4 oz)  Body mass index is 30.24 kg/m².    Intake/Output Summary (Last 24 hours) at 10/10/2019 1547  Last data filed at 10/10/2019 0617  Gross per 24 hour   Intake 360 ml   Output 75 ml   Net 285 ml      Physical Exam   Constitutional: She is oriented to person, place, and time. She appears well-developed and well-nourished.   HENT:   Head: Normocephalic and atraumatic.   Eyes: Pupils are equal, round, and reactive to light. EOM are normal.   Neck: Normal range of motion.   Cardiovascular: Normal rate.   Irreg; rate controlled   Pulmonary/Chest: Effort normal and breath sounds normal.   Abdominal: Soft. Bowel sounds are normal.   Genitourinary:    Genitourinary Comments: Deferred   Neurological: She is alert and oriented to person, place, and time.   Skin: Skin is warm and dry.   Psychiatric: She has a normal mood and affect. Her behavior is normal. Judgment and thought content normal.   Nursing note and vitals reviewed.      Significant Labs:   BMP:   Recent Labs   Lab 10/10/19  0422   *      K 4.0      CO2 25   BUN 41*   CREATININE 1.5*   CALCIUM 9.6   MG 2.1     CBC:   Recent Labs   Lab 10/09/19  0203 10/10/19  0422   WBC 10.14 9.26   HGB 12.1 11.0*   HCT 37.1 34.8*    228     CMP:   Recent Labs   Lab 10/09/19  0203 10/10/19  0422    140   K 4.3 4.0    104   CO2 22* 25   * 126*   BUN 37* 41*   CREATININE 1.4 1.5*   CALCIUM 10.4 9.6   PROT 7.4  --    ALBUMIN 3.9  --    BILITOT 0.5  --    ALKPHOS 76  --    AST 42*  --    ALT 48*  --    ANIONGAP 15 11   EGFRNONAA 34* 31*     Coagulation:   Magnesium:   Recent Labs   Lab 10/09/19  1003 10/10/19  0422   MG 2.1 2.1     Troponin:   Recent Labs   Lab 10/09/19  0203 10/09/19  1003 10/09/19  1357   TROPONINI 0.033* 0.027* 0.035*     All pertinent labs within the past 24 hours have been reviewed.    Significant Imaging: I have reviewed all pertinent imaging results/findings within the past 24 hours.

## 2019-10-10 NOTE — ANESTHESIA POSTPROCEDURE EVALUATION
Anesthesia Post Evaluation    Patient: Shirley Metz    Procedure(s) Performed: Procedure(s) (LRB):  ECHOCARDIOGRAM,TRANSESOPHAGEAL (N/A)  CARDIOVERSION (N/A)    Final Anesthesia Type: MAC  Patient location during evaluation: Cath Lab  Patient participation: Yes- Able to Participate  Level of consciousness: awake and alert and oriented  Post-procedure vital signs: reviewed and stable  Pain management: adequate  Airway patency: patent  PONV status at discharge: No PONV  Anesthetic complications: no      Cardiovascular status: hemodynamically stable  Respiratory status: spontaneous ventilation and nasal cannula  Hydration status: euvolemic  Follow-up not needed.          Vitals Value Taken Time   /62 10/10/2019 11:13 AM   Temp 36.8 °C (98.2 °F) 10/10/2019 11:13 AM   Pulse 84 10/10/2019 11:13 AM   Resp 18 10/10/2019 11:13 AM   SpO2 92 % 10/10/2019 11:13 AM         No case tracking events are documented in the log.      Pain/Beata Score: Pain Rating Prior to Med Admin: 2 (10/9/2019  7:37 PM)

## 2019-10-10 NOTE — SUBJECTIVE & OBJECTIVE
Past Medical History:   Diagnosis Date    Anemia 11/29/2018    Anxiety 11/28/2017    Arthritis     Atrial fibrillation with RVR 10/9/2019    Back pain     Basal cell carcinoma 03/29/2018    left mid back    Colon polyps     reported per patient.     Fuchs' corneal dystrophy     General anesthetics causing adverse effect in therapeutic use     woke up during surgery and gagged on ET tube    Glaucoma     Glaucoma     Heart failure with preserved left ventricular function (HFpEF) 7/24/2018    Hyperlipidemia     Hypertension     Macular degeneration dry    Obesity     Squamous cell carcinoma 01/08/2019    left shoulder    Trouble in sleeping     Urinary tract infection        Past Surgical History:   Procedure Laterality Date    ADENOIDECTOMY  1938    BREAST BIOPSY Left     1997. benign    BREAST CYST EXCISION Left 1997 approx    CARPAL TUNNEL RELEASE Right 2012 approx    CATARACT EXTRACTION Bilateral 1994    CHOLECYSTECTOMY  1975    CORNEAL TRANSPLANT Bilateral 08/2015 8/2015 - left; Right 4/2016    EYE SURGERY      Fuch's Corneal dystrophy - had 2nd operation with Dr. Quintanilla    EYE SURGERY      Cataract wIOL    left thumb Left 2011    removed cuboid for arthritis    REVERSE TOTAL SHOULDER ARTHROPLASTY Left 8/21/2018    Procedure: ARTHROPLASTY, SHOULDER, TOTAL, REVERSE;  Surgeon: Solomon Lujan MD;  Location: [facility] OR;  Service: Orthopedics;  Laterality: Left;  WITH BICEP TENODESIS    SKIN CANCER EXCISION Left 2017    BCC removal by Dr. Valadez    SLT- OS (aka TRABECULOPLASTY) Left 05/11/2011    repeat 3/14/12    TENOTOMY Left 8/21/2018    Procedure: TENOTOMY;  Surgeon: Solomon Lujan MD;  Location: [facility] OR;  Service: Orthopedics;  Laterality: Left;    TONSILLECTOMY  1938       Review of patient's allergies indicates:   Allergen Reactions    Claritin [loratadine] Other (See Comments)     Double vision/spots    Hydrocodone-acetaminophen      wheezing    Labetalol Shortness  Of Breath, Nausea Only and Other (See Comments)     dizziness    Naproxen      wheezing    Verapamil Diarrhea    Vibramycin [doxycycline calcium] Other (See Comments)     Weakness nausea   sob    Amlodipine      Myalgias      Coreg [carvedilol] Swelling     lips    Fenofibrate      Causes muscle weakness    Hydralazine analogues      Muscle tremors/aches      Isosorbide     Lasix [furosemide]      myalgias    Moxifloxacin Other (See Comments)     Hives   muscle soreness    Timolol     Adhesive Rash     Clonidine patch - 2 mfg - contact dermatitis    Allegra [fexofenadine] Other (See Comments)     Double vision/spots     Codeine Diarrhea and Other (See Comments)     Loss of balance     Erythromycin Other (See Comments)     Complete weakness/could not walk    Hydrocortisone (bulk) Other (See Comments)     Only suppository/ caused muscle weakness    Macrolide antibiotics Other (See Comments)     Complete weakness/could not walk    Patanol [olopatadine] Other (See Comments)     Muscle weakness    Prednisone Anxiety    Statins-hmg-coa reductase inhibitors Other (See Comments)     Muscle weakness    Xalatan [latanoprost] Other (See Comments)     Muscle weakness       Current Facility-Administered Medications on File Prior to Encounter   Medication    sodium chloride 0.9% flush 3 mL     Current Outpatient Medications on File Prior to Encounter   Medication Sig    acetaminophen (TYLENOL) 500 MG tablet Take 500 mg by mouth daily as needed. Taking 1 to 3    ALPHA LIPOIC ACID ORAL Take 200 mg by mouth.     ALPRAZolam (XANAX) 0.5 MG tablet TAKE 1/2 - 1 TABLET BY MOUTH NIGHTLY FOR SLEEP OR ANXIETY    apixaban (ELIQUIS) 2.5 mg Tab Take 1 tablet (2.5 mg total) by mouth 2 (two) times daily.    B complex-vitamin C-folic acid 0.8 mg Tab Take 1 tablet by mouth once daily.    Boswellia lashawn extract (BOSWELLIA LASHAWN XT, BULK, MISC) by Misc.(Non-Drug; Combo Route) route.    CALCIUM CITRATE ORAL Take by  mouth.    cholecalciferol, vitamin D3, (VITAMIN D3) 2,000 unit Cap Take 1 capsule by mouth once daily.    CHROMIUM ORAL Take 200 mg by mouth once daily.    coenzyme Q10 200 mg capsule Take 400 mg by mouth once daily.     fish oil-omega-3 fatty acids 300-1,000 mg capsule Take 2 g by mouth 2 (two) times daily.     hydroCHLOROthiazide (HYDRODIURIL) 12.5 MG Tab Take 12.5 mg by mouth once daily.    losartan (COZAAR) 50 MG tablet Take 1 tablet (50 mg total) by mouth 2 (two) times daily.    LOTEMAX 0.5 % DrpG     metoprolol tartrate (LOPRESSOR) 50 MG tablet Take 2 tablets (100 mg total) by mouth 2 (two) times daily.    POLYETHYLENE GLYCOL 3350 (MIRALAX ORAL) Take by mouth as needed.     pyridoxine, vitamin B6, (B-6) 100 MG Tab Take 100 mg by mouth once daily.     RHOPRESSA 0.02 % Drop     TRAVATAN Z 0.004 % ophthalmic solution PLACE 1 DROP INTO THE LEFT EYE EVERY EVENING.    TURMERIC ORAL Take 500 mg by mouth 2 (two) times daily.    vitamin E 400 UNIT capsule Take 400 Units by mouth once daily.     [DISCONTINUED] diphenhydramine-acetaminophen (TYLENOL PM)  mg Tab Take 1 tablet by mouth nightly as needed.      Family History     Problem Relation (Age of Onset)    Cancer Father (88)    Heart disease Mother    Hypertension Mother        Tobacco Use    Smoking status: Never Smoker    Smokeless tobacco: Never Used    Tobacco comment: history of passive smoking from her ex-   Substance and Sexual Activity    Alcohol use: Yes     Alcohol/week: 2.0 standard drinks     Types: 2 Standard drinks or equivalent per week     Comment: occasional     Drug use: No    Sexual activity: Not Currently     Partners: Male     Birth control/protection: Post-menopausal     Comment: discussed protection for STD's     Review of Systems   Constitutional: Negative for activity change and appetite change.   HENT: Negative.    Eyes: Negative for discharge and itching.   Respiratory: Negative for apnea and shortness of  breath.    Cardiovascular: Positive for palpitations. Negative for chest pain and leg swelling.   Gastrointestinal: Negative for abdominal distention.   Endocrine: Negative for cold intolerance and heat intolerance.   Genitourinary: Negative for difficulty urinating.   Musculoskeletal: Negative.    Skin: Negative.    Allergic/Immunologic: Negative.    Neurological: Negative for dizziness, weakness and light-headedness.   Hematological: Negative for adenopathy. Does not bruise/bleed easily.   Psychiatric/Behavioral: Negative for agitation and behavioral problems.     Objective:     Vital Signs (Most Recent):  Temp: 98 °F (36.7 °C) (10/09/19 0739)  Pulse: (!) 111 (10/09/19 0739)  Resp: (!) 27 (10/09/19 0739)  BP: 135/68 (10/09/19 0739)  SpO2: 98 % (10/09/19 0739) Vital Signs (24h Range):  Temp:  [98 °F (36.7 °C)-98.3 °F (36.8 °C)] 98 °F (36.7 °C)  Pulse:  [] 111  Resp:  [16-27] 27  SpO2:  [95 %-99 %] 98 %  BP: (107-145)/(64-80) 135/68     Weight: 78 kg (172 lb)  Body mass index is 29.52 kg/m².    Physical Exam   Constitutional: She is oriented to person, place, and time. She appears well-developed and well-nourished.   HENT:   Head: Normocephalic and atraumatic.   Eyes: Pupils are equal, round, and reactive to light. EOM are normal.   Neck: Normal range of motion.   Cardiovascular: Normal rate.   Irreg; rate controlled   Pulmonary/Chest: Effort normal and breath sounds normal.   Abdominal: Soft. Bowel sounds are normal.   Genitourinary:   Genitourinary Comments: Deferred   Neurological: She is alert and oriented to person, place, and time.   Skin: Skin is warm and dry.   Psychiatric: She has a normal mood and affect. Her behavior is normal. Judgment and thought content normal.   Nursing note and vitals reviewed.        CRANIAL NERVES     CN III, IV, VI   Pupils are equal, round, and reactive to light.  Extraocular motions are normal.        Significant Labs:   BMP:   Recent Labs   Lab 10/09/19  020  10/09/19  1003   *  --      --    K 4.3  --      --    CO2 22*  --    BUN 37*  --    CREATININE 1.4  --    CALCIUM 10.4  --    MG  --  2.1     CBC:   Recent Labs   Lab 10/09/19  0203   WBC 10.14   HGB 12.1   HCT 37.1        CMP:   Recent Labs   Lab 10/09/19  0203      K 4.3      CO2 22*   *   BUN 37*   CREATININE 1.4   CALCIUM 10.4   PROT 7.4   ALBUMIN 3.9   BILITOT 0.5   ALKPHOS 76   AST 42*   ALT 48*   ANIONGAP 15   EGFRNONAA 34*     Coagulation: No results for input(s): PT, INR, APTT in the last 48 hours.  Lipid Panel: No results for input(s): CHOL, HDL, LDLCALC, TRIG, CHOLHDL in the last 48 hours.  Troponin:   Recent Labs   Lab 10/09/19  0203 10/09/19  1003 10/09/19  1357   TROPONINI 0.033* 0.027* 0.035*       Significant Imaging:   Imaging Results          X-Ray Chest AP Portable (Final result)  Result time 10/09/19 08:23:30    Final result by Victor M Lopez MD (10/09/19 08:23:30)                 Impression:      No acute abnormality.      Electronically signed by: Victor M Lopez  Date:    10/09/2019  Time:    08:23             Narrative:    EXAMINATION:  XR CHEST AP PORTABLE    CLINICAL HISTORY:  Chest Pain;.    TECHNIQUE:  Single frontal portable view of the chest was performed.    COMPARISON:  03/15/2018    FINDINGS:  Support devices: Telemetry leads    The lungs are clear, with normal appearance of pulmonary vasculature and no pleural effusion or pneumothorax.    Unchanged cardiomegaly.    Left total shoulder arthroplasty.

## 2019-10-10 NOTE — ASSESSMENT & PLAN NOTE
Continue present regimen  Monitor renal function and electrolytes;   Avoid nephrotoxins  Renal adjust meds.    Likely sec to HCTZ and Afib w RVR

## 2019-10-10 NOTE — PROGRESS NOTES
Clinical Pharmacy Progress Note: Telemetry Pharmacist Rounding     Pharmacist counseled patient on the telemetry pharmacist's availability to discuss new medications, side effects, and reasons for changes in medication during admission.   Asked if patient had any medication questions at this time.   Instructed patient to use call light with any questions or concerns to discuss with pharmacy during the admission.  Offered bedside medication delivery to patient.   Patient expressed understanding and had no further questions.    Thank you for allowing us to participate in this patient's care.  Laura Schroeder PharmD 10/10/2019 3:30 PM

## 2019-10-11 VITALS
HEIGHT: 64 IN | OXYGEN SATURATION: 95 % | SYSTOLIC BLOOD PRESSURE: 128 MMHG | WEIGHT: 175.5 LBS | RESPIRATION RATE: 18 BRPM | DIASTOLIC BLOOD PRESSURE: 74 MMHG | HEART RATE: 87 BPM | TEMPERATURE: 98 F | BODY MASS INDEX: 29.96 KG/M2

## 2019-10-11 PROBLEM — R07.9 CHEST PAIN: Status: RESOLVED | Noted: 2019-10-09 | Resolved: 2019-10-11

## 2019-10-11 LAB
ANION GAP SERPL CALC-SCNC: 11 MMOL/L (ref 8–16)
BASOPHILS # BLD AUTO: 0.04 K/UL (ref 0–0.2)
BASOPHILS NFR BLD: 0.5 % (ref 0–1.9)
BUN SERPL-MCNC: 46 MG/DL (ref 8–23)
CALCIUM SERPL-MCNC: 9.6 MG/DL (ref 8.7–10.5)
CHLORIDE SERPL-SCNC: 104 MMOL/L (ref 95–110)
CO2 SERPL-SCNC: 24 MMOL/L (ref 23–29)
CREAT SERPL-MCNC: 1.6 MG/DL (ref 0.5–1.4)
DIFFERENTIAL METHOD: ABNORMAL
EOSINOPHIL # BLD AUTO: 0.1 K/UL (ref 0–0.5)
EOSINOPHIL NFR BLD: 1.4 % (ref 0–8)
ERYTHROCYTE [DISTWIDTH] IN BLOOD BY AUTOMATED COUNT: 13.5 % (ref 11.5–14.5)
EST. GFR  (AFRICAN AMERICAN): 33 ML/MIN/1.73 M^2
EST. GFR  (NON AFRICAN AMERICAN): 29 ML/MIN/1.73 M^2
GLUCOSE SERPL-MCNC: 116 MG/DL (ref 70–110)
HCT VFR BLD AUTO: 32.6 % (ref 37–48.5)
HGB BLD-MCNC: 10.4 G/DL (ref 12–16)
IMM GRANULOCYTES # BLD AUTO: 0.04 K/UL (ref 0–0.04)
IMM GRANULOCYTES NFR BLD AUTO: 0.5 % (ref 0–0.5)
LYMPHOCYTES # BLD AUTO: 1.5 K/UL (ref 1–4.8)
LYMPHOCYTES NFR BLD: 16.9 % (ref 18–48)
MAGNESIUM SERPL-MCNC: 2.2 MG/DL (ref 1.6–2.6)
MCH RBC QN AUTO: 31.5 PG (ref 27–31)
MCHC RBC AUTO-ENTMCNC: 31.9 G/DL (ref 32–36)
MCV RBC AUTO: 99 FL (ref 82–98)
MONOCYTES # BLD AUTO: 0.9 K/UL (ref 0.3–1)
MONOCYTES NFR BLD: 10.6 % (ref 4–15)
NEUTROPHILS # BLD AUTO: 6 K/UL (ref 1.8–7.7)
NEUTROPHILS NFR BLD: 70.1 % (ref 38–73)
NRBC BLD-RTO: 0 /100 WBC
PLATELET # BLD AUTO: 214 K/UL (ref 150–350)
PMV BLD AUTO: 10.6 FL (ref 9.2–12.9)
POTASSIUM SERPL-SCNC: 4.2 MMOL/L (ref 3.5–5.1)
RBC # BLD AUTO: 3.3 M/UL (ref 4–5.4)
SODIUM SERPL-SCNC: 139 MMOL/L (ref 136–145)
WBC # BLD AUTO: 8.57 K/UL (ref 3.9–12.7)

## 2019-10-11 PROCEDURE — 83735 ASSAY OF MAGNESIUM: CPT | Mod: HCNC

## 2019-10-11 PROCEDURE — 25000003 PHARM REV CODE 250: Mod: HCNC | Performed by: INTERNAL MEDICINE

## 2019-10-11 PROCEDURE — 25000003 PHARM REV CODE 250: Mod: HCNC | Performed by: PHYSICIAN ASSISTANT

## 2019-10-11 PROCEDURE — 99233 SBSQ HOSP IP/OBS HIGH 50: CPT | Mod: HCNC,,, | Performed by: INTERNAL MEDICINE

## 2019-10-11 PROCEDURE — 85025 COMPLETE CBC W/AUTO DIFF WBC: CPT | Mod: HCNC

## 2019-10-11 PROCEDURE — 36415 COLL VENOUS BLD VENIPUNCTURE: CPT | Mod: HCNC

## 2019-10-11 PROCEDURE — 25000003 PHARM REV CODE 250: Mod: HCNC

## 2019-10-11 PROCEDURE — 80048 BASIC METABOLIC PNL TOTAL CA: CPT | Mod: HCNC

## 2019-10-11 PROCEDURE — 99233 PR SUBSEQUENT HOSPITAL CARE,LEVL III: ICD-10-PCS | Mod: HCNC,,, | Performed by: INTERNAL MEDICINE

## 2019-10-11 RX ORDER — DILTIAZEM HYDROCHLORIDE 120 MG/1
120 CAPSULE, COATED, EXTENDED RELEASE ORAL DAILY
Status: DISCONTINUED | OUTPATIENT
Start: 2019-10-11 | End: 2019-10-11 | Stop reason: HOSPADM

## 2019-10-11 RX ORDER — ONDANSETRON 8 MG/1
8 TABLET, ORALLY DISINTEGRATING ORAL EVERY 8 HOURS PRN
Qty: 30 TABLET | Refills: 1 | Status: SHIPPED | OUTPATIENT
Start: 2019-10-11 | End: 2020-01-08

## 2019-10-11 RX ORDER — DILTIAZEM HYDROCHLORIDE 120 MG/1
120 CAPSULE, COATED, EXTENDED RELEASE ORAL DAILY
Qty: 30 CAPSULE | Refills: 11 | Status: SHIPPED | OUTPATIENT
Start: 2019-10-12 | End: 2019-11-11 | Stop reason: ALTCHOICE

## 2019-10-11 RX ADMIN — ONDANSETRON 8 MG: 8 TABLET, ORALLY DISINTEGRATING ORAL at 01:10

## 2019-10-11 RX ADMIN — ACETAMINOPHEN 650 MG: 325 TABLET ORAL at 01:10

## 2019-10-11 RX ADMIN — VITAMIN E CAP 400 UNIT 400 UNITS: 400 CAP at 08:10

## 2019-10-11 RX ADMIN — DILTIAZEM HYDROCHLORIDE 120 MG: 120 CAPSULE, COATED, EXTENDED RELEASE ORAL at 01:10

## 2019-10-11 RX ADMIN — METOPROLOL TARTRATE 100 MG: 50 TABLET ORAL at 08:10

## 2019-10-11 RX ADMIN — RENO CAPS 1 CAPSULE: 100; 1.5; 1.7; 20; 10; 1; 150; 5; 6 CAPSULE ORAL at 08:10

## 2019-10-11 RX ADMIN — APIXABAN 5 MG: 2.5 TABLET, FILM COATED ORAL at 08:10

## 2019-10-11 RX ADMIN — CALCIUM CITRATE 200 MG (950 MG) TABLET 200 MG: at 08:10

## 2019-10-11 RX ADMIN — Medication 10 MG/HR: at 02:10

## 2019-10-11 RX ADMIN — Medication 100 MG: at 08:10

## 2019-10-11 RX ADMIN — POLYETHYLENE GLYCOL (3350) 17 G: 17 POWDER, FOR SOLUTION ORAL at 08:10

## 2019-10-11 NOTE — PLAN OF CARE
POC reviewed. Pt AAOx4. Diltiazem @10mg/hr. Comfort measures and safety measures addressed. Pt on  telemetry in controlled Afib. VSS. Pt remains free from falls and injury this shift.

## 2019-10-11 NOTE — ASSESSMENT & PLAN NOTE
Patient presented to ED due to chest pain and palpitations which occurred suddenly PTA  EKG revealed AFIB/AFL, variable rate control  Received IV Diltiazem bolus then IV Cardizem gtt started in ED  Received Lovenox 1 mg/kg SQ x 1 dose this AM in ED  Resume home Eliquis 2.5 mg BID for CVA prophylaxis  Increase Cardizem gtt to 15 mg/hr today  ECHO pending  NO CNS complaints to suggest TIA or CVA today  NO signs of abnormal bleeding  Repeat EKG in AM  Further recs to follow.     Discussed with patient in detail per Dr. Durán regarding risks, benefits and treatment alternatives regarding Eliquis for CVA prophylaxis. Patient agrees to proceed with Eliquis at this time.     10/10/19  -Remains in afib with variable rate  -Continue Cardizem gtt  -Continue BB  -Continue Eliquis  -STACEY/DCCV planned today by Dr. Durán. All risks, benefits, and treatment alternatives explained to patient in detail. All questions answered. She has agreed to proceed.    10/11/19  -Remains in afib  -STACEY showed LA thrombus, so no DCCV performed  -Stop cardizem gtt; start Cardizem  mg daily  -Continue Lopressor 100 mg BID  -Continue Eliquis 5 mg BID for CVA prophylaxis; will re-check renal function next week

## 2019-10-11 NOTE — PROGRESS NOTES
[facility] -   Cardiology  Progress Note    Patient Name: Shirley Metz  MRN: [id number]  Admission Date: 10/9/2019  Hospital Length of Stay: 2 days  Code Status: Full Code   Attending Physician: Emanuel wKok MD   Primary Care Physician: Yandy Terrell MD  Expected Discharge Date:   Principal Problem:New onset a-fib    Subjective:   HPI:  Shirley Metz is a 86 year old female who presented to [facility] due to chest pain radiation down her left side which started last night PTA. She was recently seen by Dr. Castillo and started on Eliquis and her medications were adjusted. Her current medical conditions include Anemia, Basal cell carcinoma, HTN, HLP, AFIB. Of note, she has numerous intolerances with multiple cardiac medications. ED workup revealed , K+ 4.3, Cr 1.4, Troponin 0.033, EKG revealed AFL/AFIB, variable rate control. She received IV Diltiazem in ED and was started on IV Diltiazem drip. She also received Lovenox 1mg/kg this AM as well. Cardiology consulted to assist with medical management. Chart reviewed, Patient seen and examined in ED. Denies chest pain on exam today. She remains in AFIB, variable rate control at time of exam today. No shortness of breath, RUIZ or palpitations. NO leg swelling or claudications. Denies orthopnea, PND or abdominal bloating today. Will adjust Cardizem gtt to 15 mg/hr today for better rate control and give an additional bolus today. ECHO pending. Further recs to follow.    Hospital Course:   10/10/19-Patient seen and examined today, sitting up on couch. Seems to feel ok. Admits to palpitations and some nausea overnight. No chest pain or SOB. Labs reviewed and are stable. 2D echo showed normal EF. STACEY/DCCV planned for today.    10/11/19-Patient seen and examined today, sitting up in bed. STACEY yesterday showed LA thrombus, so no DCCV performed. Feels well. No chest pain or SOB. Remains in afib but rate controlled. Labs stable, creatinine  1.6.         Review of Systems   Constitution: Negative.   HENT: Negative.    Eyes: Negative.    Cardiovascular: Negative.    Respiratory: Negative.    Endocrine: Negative.    Hematologic/Lymphatic: Negative.    Skin: Negative.    Musculoskeletal: Positive for arthritis, back pain and joint pain.   Gastrointestinal: Negative.    Genitourinary: Negative.    Neurological: Negative.    Psychiatric/Behavioral: Negative.    Allergic/Immunologic: Negative.      Objective:     Vital Signs (Most Recent):  Temp: 97.9 °F (36.6 °C) (10/11/19 1509)  Pulse: 87 (10/11/19 1509)  Resp: 18 (10/11/19 1509)  BP: 128/74 (10/11/19 1509)  SpO2: 95 % (10/11/19 1509) Vital Signs (24h Range):  Temp:  [97.5 °F (36.4 °C)-98.4 °F (36.9 °C)] 97.9 °F (36.6 °C)  Pulse:  [60-92] 87  Resp:  [18] 18  SpO2:  [92 %-96 %] 95 %  BP: (117-136)/(55-74) 128/74     Weight: 79.6 kg (175 lb 7.8 oz)  Body mass index is 30.12 kg/m².     SpO2: 95 %  O2 Device (Oxygen Therapy): room air      Intake/Output Summary (Last 24 hours) at 10/11/2019 1616  Last data filed at 10/11/2019 0400  Gross per 24 hour   Intake 652 ml   Output 875 ml   Net -223 ml       Lines/Drains/Airways     Peripheral Intravenous Line                 Peripheral IV - Single Lumen 10/10/19 1511 22 G;1 in Right Wrist 1 day                Physical Exam   Constitutional: She is oriented to person, place, and time. She appears well-developed and well-nourished. No distress.   HENT:   Head: Normocephalic and atraumatic.   Eyes: Pupils are equal, round, and reactive to light. Right eye exhibits no discharge. Left eye exhibits no discharge.   Neck: Neck supple. No JVD present.   Cardiovascular: Normal rate, S1 normal, S2 normal and normal heart sounds. An irregularly irregular rhythm present.   No murmur heard.  Pulmonary/Chest: Effort normal and breath sounds normal. No respiratory distress. She has no wheezes. She has no rales.   Abdominal: Soft. She exhibits no distension.   Musculoskeletal: She  exhibits no edema.   Neurological: She is alert and oriented to person, place, and time.   Skin: Skin is warm and dry. She is not diaphoretic. No erythema.   Psychiatric: She has a normal mood and affect. Her behavior is normal. Thought content normal.   Nursing note and vitals reviewed.      Significant Labs:   CMP   Recent Labs   Lab 10/10/19  0422 10/11/19  0410    139   K 4.0 4.2    104   CO2 25 24   * 116*   BUN 41* 46*   CREATININE 1.5* 1.6*   CALCIUM 9.6 9.6   ANIONGAP 11 11   ESTGFRAFRICA 36* 33*   EGFRNONAA 31* 29*   , CBC   Recent Labs   Lab 10/10/19  0422 10/11/19  0410   WBC 9.26 8.57   HGB 11.0* 10.4*   HCT 34.8* 32.6*    214   , Troponin No results for input(s): TROPONINI in the last 48 hours. and All pertinent lab results from the last 24 hours have been reviewed.    Significant Imaging: Echocardiogram:   2D echo with color flow doppler:   Results for orders placed or performed during the hospital encounter of 10/09/19   2D echo with color flow doppler   Result Value Ref Range    QEF 55 55 - 65    Mitral Valve Regurgitation MILD TO MODERATE     Diastolic Dysfunction No     Est. PA Systolic Pressure 40.04 (A)     Pericardial Effusion TRIVIAL     Tricuspid Valve Regurgitation MILD     Narrative    Date of Procedure: 10/09/2019        TEST DESCRIPTION   Technical Quality: This is a technically challenging study. There is poor endocardial definition.     General: The patient was in an irregularly irregular rhythm throughout the study.     Aorta: The aortic root is normal in size, measuring 2.9 cm at sinotubular junction and 3.4 cm at Sinuses of Valsalva. The proximal ascending aorta is normal in size, measuring 2.9 cm across.     Left Atrium: The left atrial volume index is normal, measuring 22.02 cc/m2.     Left Ventricle: The left ventricle is normal in size, with an end-diastolic diameter of 3.8 cm, and an end-systolic diameter of 2.8 cm. Wall thickness is increased, with the  septum measuring 1.7 cm and the posterior wall measuring 1.6 cm across. Relative   wall thickness was increased at 0.84, and the LV mass index was increased at 164.8 g/m2 consistent with concentric left ventricular hypertrophy. There are no regional wall motion abnormalities. Left ventricular systolic function appears normal. Visually   estimated ejection fraction is 55-60%. The LV Doppler derived stroke volume equals 60.0 ccs.     Diastolic indices: E wave velocity 1.0 m/s, E/A ratio 4.8,  msec., E/e' ratio(avg) 8. Diastolic function is normal.     Right Atrium: The right atrium is normal in size, measuring 3.9 cm in length in the apical view.     Right Ventricle: The right ventricle is normal in size. Global right ventricular systolic function appears normal. Tricuspid annular plane systolic excursion (TAPSE) is 1.5 cm. The estimated PA systolic pressure is 40 mmHg.     Aortic Valve:  The aortic valve is normal in structure. The mean gradient obtained across the aortic valve is 7 mmHg.     Mitral Valve:  The mitral valve is mildly sclerotic. The pressure half time is 62 msec. The calculated mitral valve area is 3.55 cm2. There is mild to moderate mitral regurgitation. There is mitral annular calcification.     Tricuspid Valve:  The tricuspid valve is normal in structure. There is mild tricuspid regurgitation.     Pulmonary Valve:  The pulmonic valve is not well seen.     Pericardium: There is evidence of a trivial pericardial effusion.     IVC: IVC is enlarged but collapses > 50% with a sniff, suggesting intermediate right atrial pressure of 8 mmHg.     Intracavitary: There is no evidence of intracavity mass, thrombi, or vegetation.         CONCLUSIONS     1 - Normal left ventricular systolic function (EF 55-60%).     2 - Normal left ventricular diastolic function.     3 - Normal right ventricular systolic function .     4 - Mild to moderate mitral regurgitation.     5 - Concentric hypertrophy.              This document has been electronically    SIGNED BY: Pete Durán MD On: 10/09/2019 20:00   , EKG: Reviewed and X-Ray: CXR: X-Ray Chest 1 View (CXR): No results found for this visit on 10/09/19. and X-Ray Chest PA and Lateral (CXR): No results found for this visit on 10/09/19.    Assessment and Plan:   Patient who presents with new onset afib. Meds adjusted. Follow-up in clinic.    * New onset a-fib  Patient presented to ED due to chest pain and palpitations which occurred suddenly PTA  EKG revealed AFIB/AFL, variable rate control  Received IV Diltiazem bolus then IV Cardizem gtt started in ED  Received Lovenox 1 mg/kg SQ x 1 dose this AM in ED  Resume home Eliquis 2.5 mg BID for CVA prophylaxis  Increase Cardizem gtt to 15 mg/hr today  ECHO pending  NO CNS complaints to suggest TIA or CVA today  NO signs of abnormal bleeding  Repeat EKG in AM  Further recs to follow.     Discussed with patient in detail per Dr. Durán regarding risks, benefits and treatment alternatives regarding Eliquis for CVA prophylaxis. Patient agrees to proceed with Eliquis at this time.     10/10/19  -Remains in afib with variable rate  -Continue Cardizem gtt  -Continue BB  -Continue Eliquis  -STACEY/DCCV planned today by Dr. Durán. All risks, benefits, and treatment alternatives explained to patient in detail. All questions answered. She has agreed to proceed.    10/11/19  -Remains in afib  -STACEY showed LA thrombus, so no DCCV performed  -Stop cardizem gtt; start Cardizem  mg daily  -Continue Lopressor 100 mg BID  -Continue Eliquis 5 mg BID for CVA prophylaxis; will re-check renal function next week    Chest pain  Presented to ED due to chest pain and found to be in AFIB, variable rate control  Chest pain resolved after receiving IV diltiazem  Troponin 0.033, continue to trend serial troponin  ECHO pending    10/10/19  -Resolved  -Likely secondary to afib with RVR  -Troponin slightly elevated but flat  -2D echo showed normal  EF  -Consider MPI stress test as OP    ARF/ Pre Renal Azotemia  -Creatinine stable, monitor    HTN (hypertension)  -Switch diltizem gtt to cardizem  mg daily  -Continue BB        VTE Risk Mitigation (From admission, onward)         Ordered     apixaban tablet 5 mg  2 times daily      10/10/19 4365                Jo-Ann Jacobo PA-C  Cardiology  [facility]

## 2019-10-11 NOTE — PHYSICIAN QUERY
PT Name: Shirley Metz  MR #: [id number]     Physician Query Form - Documentation Clarification      CDS/: Chelsea Davis               Contact information:[email or web address]    This form is a permanent document in the medical record.     Query Date: October 11, 2019    By submitting this query, we are merely seeking further clarification of documentation. Please utilize your independent clinical judgment when addressing the question(s) below.    The Medical record reflects the following:    Supporting Clinical Findings Location in Medical Record   ARF/ Pre Renal Azotemia   Continue present regimen   Monitor renal function and electrolytes;   Avoid nephrotoxins   Renal adjust meds.      Chronic kidney disease, stage 3   Continue present regimen   Monitor renal function and electrolytes;   Avoid nephrotoxins   Renal adjust meds.      Bun=37 to 46  Creatinine=1.4 to 1.6   HM pn 10-10            H&P            Lab 10-9 to 10-11                                                                                    Doctor, Please specify diagnosis or diagnoses associated with above clinical findings.    Please further specify the renal condition and poa status.    Provider Use Only      [ xx  ] ARF meaning acute renal failure present on admit    [   ] ARF meaning acute renal failure and not present on admit    [   ] Renal insufficiency present on admit      [   ] Renal insufficiency not present on admit      [   ] Other please explain _________________                                                                                                                   [   ] Clinically Undetermined

## 2019-10-11 NOTE — SUBJECTIVE & OBJECTIVE
Review of Systems   Constitution: Negative.   HENT: Negative.    Eyes: Negative.    Cardiovascular: Negative.    Respiratory: Negative.    Endocrine: Negative.    Hematologic/Lymphatic: Negative.    Skin: Negative.    Musculoskeletal: Positive for arthritis, back pain and joint pain.   Gastrointestinal: Negative.    Genitourinary: Negative.    Neurological: Negative.    Psychiatric/Behavioral: Negative.    Allergic/Immunologic: Negative.      Objective:     Vital Signs (Most Recent):  Temp: 97.9 °F (36.6 °C) (10/11/19 1509)  Pulse: 87 (10/11/19 1509)  Resp: 18 (10/11/19 1509)  BP: 128/74 (10/11/19 1509)  SpO2: 95 % (10/11/19 1509) Vital Signs (24h Range):  Temp:  [97.5 °F (36.4 °C)-98.4 °F (36.9 °C)] 97.9 °F (36.6 °C)  Pulse:  [60-92] 87  Resp:  [18] 18  SpO2:  [92 %-96 %] 95 %  BP: (117-136)/(55-74) 128/74     Weight: 79.6 kg (175 lb 7.8 oz)  Body mass index is 30.12 kg/m².     SpO2: 95 %  O2 Device (Oxygen Therapy): room air      Intake/Output Summary (Last 24 hours) at 10/11/2019 1616  Last data filed at 10/11/2019 0400  Gross per 24 hour   Intake 652 ml   Output 875 ml   Net -223 ml       Lines/Drains/Airways     Peripheral Intravenous Line                 Peripheral IV - Single Lumen 10/10/19 1511 22 G;1 in Right Wrist 1 day                Physical Exam   Constitutional: She is oriented to person, place, and time. She appears well-developed and well-nourished. No distress.   HENT:   Head: Normocephalic and atraumatic.   Eyes: Pupils are equal, round, and reactive to light. Right eye exhibits no discharge. Left eye exhibits no discharge.   Neck: Neck supple. No JVD present.   Cardiovascular: Normal rate, S1 normal, S2 normal and normal heart sounds. An irregularly irregular rhythm present.   No murmur heard.  Pulmonary/Chest: Effort normal and breath sounds normal. No respiratory distress. She has no wheezes. She has no rales.   Abdominal: Soft. She exhibits no distension.   Musculoskeletal: She exhibits no  edema.   Neurological: She is alert and oriented to person, place, and time.   Skin: Skin is warm and dry. She is not diaphoretic. No erythema.   Psychiatric: She has a normal mood and affect. Her behavior is normal. Thought content normal.   Nursing note and vitals reviewed.      Significant Labs:   CMP   Recent Labs   Lab 10/10/19  0422 10/11/19  0410    139   K 4.0 4.2    104   CO2 25 24   * 116*   BUN 41* 46*   CREATININE 1.5* 1.6*   CALCIUM 9.6 9.6   ANIONGAP 11 11   ESTGFRAFRICA 36* 33*   EGFRNONAA 31* 29*   , CBC   Recent Labs   Lab 10/10/19  0422 10/11/19  0410   WBC 9.26 8.57   HGB 11.0* 10.4*   HCT 34.8* 32.6*    214   , Troponin No results for input(s): TROPONINI in the last 48 hours. and All pertinent lab results from the last 24 hours have been reviewed.    Significant Imaging: Echocardiogram:   2D echo with color flow doppler:   Results for orders placed or performed during the hospital encounter of 10/09/19   2D echo with color flow doppler   Result Value Ref Range    QEF 55 55 - 65    Mitral Valve Regurgitation MILD TO MODERATE     Diastolic Dysfunction No     Est. PA Systolic Pressure 40.04 (A)     Pericardial Effusion TRIVIAL     Tricuspid Valve Regurgitation MILD     Narrative    Date of Procedure: 10/09/2019        TEST DESCRIPTION   Technical Quality: This is a technically challenging study. There is poor endocardial definition.     General: The patient was in an irregularly irregular rhythm throughout the study.     Aorta: The aortic root is normal in size, measuring 2.9 cm at sinotubular junction and 3.4 cm at Sinuses of Valsalva. The proximal ascending aorta is normal in size, measuring 2.9 cm across.     Left Atrium: The left atrial volume index is normal, measuring 22.02 cc/m2.     Left Ventricle: The left ventricle is normal in size, with an end-diastolic diameter of 3.8 cm, and an end-systolic diameter of 2.8 cm. Wall thickness is increased, with the septum  measuring 1.7 cm and the posterior wall measuring 1.6 cm across. Relative   wall thickness was increased at 0.84, and the LV mass index was increased at 164.8 g/m2 consistent with concentric left ventricular hypertrophy. There are no regional wall motion abnormalities. Left ventricular systolic function appears normal. Visually   estimated ejection fraction is 55-60%. The LV Doppler derived stroke volume equals 60.0 ccs.     Diastolic indices: E wave velocity 1.0 m/s, E/A ratio 4.8,  msec., E/e' ratio(avg) 8. Diastolic function is normal.     Right Atrium: The right atrium is normal in size, measuring 3.9 cm in length in the apical view.     Right Ventricle: The right ventricle is normal in size. Global right ventricular systolic function appears normal. Tricuspid annular plane systolic excursion (TAPSE) is 1.5 cm. The estimated PA systolic pressure is 40 mmHg.     Aortic Valve:  The aortic valve is normal in structure. The mean gradient obtained across the aortic valve is 7 mmHg.     Mitral Valve:  The mitral valve is mildly sclerotic. The pressure half time is 62 msec. The calculated mitral valve area is 3.55 cm2. There is mild to moderate mitral regurgitation. There is mitral annular calcification.     Tricuspid Valve:  The tricuspid valve is normal in structure. There is mild tricuspid regurgitation.     Pulmonary Valve:  The pulmonic valve is not well seen.     Pericardium: There is evidence of a trivial pericardial effusion.     IVC: IVC is enlarged but collapses > 50% with a sniff, suggesting intermediate right atrial pressure of 8 mmHg.     Intracavitary: There is no evidence of intracavity mass, thrombi, or vegetation.         CONCLUSIONS     1 - Normal left ventricular systolic function (EF 55-60%).     2 - Normal left ventricular diastolic function.     3 - Normal right ventricular systolic function .     4 - Mild to moderate mitral regurgitation.     5 - Concentric hypertrophy.             This  document has been electronically    SIGNED BY: Pete Durán MD On: 10/09/2019 20:00   , EKG: Reviewed and X-Ray: CXR: X-Ray Chest 1 View (CXR): No results found for this visit on 10/09/19. and X-Ray Chest PA and Lateral (CXR): No results found for this visit on 10/09/19.

## 2019-10-11 NOTE — PLAN OF CARE
Patient discharged to home. PIV removed. Tele-monitor removed. Discharge instructions reviewed with pt. Prescriptions sent to patient's pharmacy. Patient wheeled down to son's car.

## 2019-10-13 NOTE — DISCHARGE SUMMARY
[facility] Hospital Medicine  Discharge Summary      Patient Name: Shirley Metz  MRN: [id number]  Admission Date: 10/9/2019  Hospital Length of Stay: 2 days  Discharge Date and Time:  10/13/2019 10:36 AM  Attending Physician: No att. providers found   Discharging Provider: Emanuel Kwok MD  Primary Care Provider: Yandy Terrell MD      HPI:   87 YO WF presenting with complaints of left sided chest discomfort and palpitations.  She has history of hypertension for several years and complains of recent fluctuations in the BP;  She was recently referred to cardiology Dr. Randhawa with apparent new onset of atrial fibrillation.  She was started on eliquis and asked to follow up in the office;  With the occurrence of chest pain she was told to come in to the hospital for further management;  She is chest pain free at present;      Procedure(s) (LRB):  ECHOCARDIOGRAM,TRANSESOPHAGEAL (N/A)  CARDIOVERSION (N/A)      Hospital Course:   Pleasant elderly lady, hx of HTN, sitting up in bed comfortably, on Cardizem drip, rate controlled at 75 but remains in Afib. On Eliquis. Mildly Pre renal and troponin remains flat.  10/10- looks comfortable, Nuclear stress test Negative, about to get STACEY/DCCV by Dr. Durán.    10/11- pt underwent STACEY which showed LA Thrombus-- hence no cardioversion. Pt is already on Eliquis and her HR is controlled. She was cleared by Dr. Durán for discharge and may undergo cardioversion in 4-6 weeks if needed and if there is no thrombus seen. She is doing well, eating drinking well. She was seen and examined and deemed stable for discharge home today.       Consults:   Consults (From admission, onward)        Status Ordering Provider     Inpatient consult to Cardiology  Once     Provider:  (Not yet assigned)    Completed BRAD TOUSSAINT          No new Assessment & Plan notes have been filed under this hospital service since the last note was generated.  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 Schedule an appointment as soon as possible for a visit in 1 week.    Specialty:  Cardiology  Why:  Hospital follow up  Contact information:  [address] [place] [address] DR Juan C [facility] [id number] [phone]                 Patient Instructions:      Diet Cardiac     Activity as tolerated       Significant Diagnostic Studies: Labs:   Lab Results   Component Value Date    WBC 8.57 10/11/2019    RBC 3.30 (L) 10/11/2019    HGB 10.4 (L) 10/11/2019    HCT 32.6 (L) 10/11/2019    MCV 99 (H) 10/11/2019    MCH 31.5 (H) 10/11/2019    MCHC 31.9 (L) 10/11/2019    RDW 13.5 10/11/2019     10/11/2019    MPV 10.6 10/11/2019    GRAN 6.0 10/11/2019    GRAN 70.1 10/11/2019    LYMPH 1.5 10/11/2019    LYMPH 16.9 (L) 10/11/2019    MONO 0.9 10/11/2019    MONO 10.6 10/11/2019    EOS 0.1 10/11/2019    BASO 0.04 10/11/2019    EOSINOPHIL 1.4 10/11/2019    BASOPHIL 0.5 10/11/2019    INR   Lab Results   Component Value Date    INR 1.0 01/25/2018    INR 1.0 01/01/2017    INR 1.0 02/09/2016   , Troponin   Recent Labs   Lab 10/09/19  1357   TROPONINI 0.035*   , A1C:   Recent Labs   Lab 08/01/19  1015   HGBA1C 6.0*     CMP  Sodium   Date Value Ref Range Status   10/11/2019 139 136 - 145 mmol/L Final     Potassium   Date Value Ref Range Status   10/11/2019 4.2 3.5 - 5.1 mmol/L Final     Chloride   Date Value Ref Range Status   10/11/2019 104 95 - 110 mmol/L Final     CO2   Date Value Ref  Range Status   10/11/2019 24 23 - 29 mmol/L Final     Glucose   Date Value Ref Range Status   10/11/2019 116 (H) 70 - 110 mg/dL Final     BUN, Bld   Date Value Ref Range Status   10/11/2019 46 (H) 8 - 23 mg/dL Final     Creatinine   Date Value Ref Range Status   10/11/2019 1.6 (H) 0.5 - 1.4 mg/dL Final     Calcium   Date Value Ref Range Status   10/11/2019 9.6 8.7 - 10.5 mg/dL Final     Total Protein   Date Value Ref Range Status   10/09/2019 7.4 6.0 - 8.4 g/dL Final     Albumin   Date Value Ref Range Status   10/09/2019 3.9 3.5 - 5.2 g/dL Final     Total Bilirubin   Date Value Ref Range Status   10/09/2019 0.5 0.1 - 1.0 mg/dL Final     Comment:     For infants and newborns, interpretation of results should be based  on gestational age, weight and in agreement with clinical  observations.  Premature Infant recommended reference ranges:  Up to 24 hours.............<8.0 mg/dL  Up to 48 hours............<12.0 mg/dL  3-5 days..................<15.0 mg/dL  6-29 days.................<15.0 mg/dL       Alkaline Phosphatase   Date Value Ref Range Status   10/09/2019 76 55 - 135 U/L Final     AST   Date Value Ref Range Status   10/09/2019 42 (H) 10 - 40 U/L Final     ALT   Date Value Ref Range Status   10/09/2019 48 (H) 10 - 44 U/L Final     Anion Gap   Date Value Ref Range Status   10/11/2019 11 8 - 16 mmol/L Final     eGFR if    Date Value Ref Range Status   10/11/2019 33 (A) >60 mL/min/1.73 m^2 Final     eGFR if non    Date Value Ref Range Status   10/11/2019 29 (A) >60 mL/min/1.73 m^2 Final     Comment:     Calculation used to obtain the estimated glomerular filtration  rate (eGFR) is the CKD-EPI equation.       All labs within the past 24 hours have been reviewed  Cardiac Graphics: Echocardiogram:   2D echo with color flow doppler:   Results for orders placed or performed during the hospital encounter of 10/09/19   2D echo with color flow doppler   Result Value Ref Range    QEF 55 55 - 65     Mitral Valve Regurgitation MILD TO MODERATE     Diastolic Dysfunction No     Est. PA Systolic Pressure 40.04 (A)     Pericardial Effusion TRIVIAL     Tricuspid Valve Regurgitation MILD     Narrative    Date of Procedure: 10/09/2019        TEST DESCRIPTION   Technical Quality: This is a technically challenging study. There is poor endocardial definition.     General: The patient was in an irregularly irregular rhythm throughout the study.     Aorta: The aortic root is normal in size, measuring 2.9 cm at sinotubular junction and 3.4 cm at Sinuses of Valsalva. The proximal ascending aorta is normal in size, measuring 2.9 cm across.     Left Atrium: The left atrial volume index is normal, measuring 22.02 cc/m2.     Left Ventricle: The left ventricle is normal in size, with an end-diastolic diameter of 3.8 cm, and an end-systolic diameter of 2.8 cm. Wall thickness is increased, with the septum measuring 1.7 cm and the posterior wall measuring 1.6 cm across. Relative   wall thickness was increased at 0.84, and the LV mass index was increased at 164.8 g/m2 consistent with concentric left ventricular hypertrophy. There are no regional wall motion abnormalities. Left ventricular systolic function appears normal. Visually   estimated ejection fraction is 55-60%. The LV Doppler derived stroke volume equals 60.0 ccs.     Diastolic indices: E wave velocity 1.0 m/s, E/A ratio 4.8,  msec., E/e' ratio(avg) 8. Diastolic function is normal.     Right Atrium: The right atrium is normal in size, measuring 3.9 cm in length in the apical view.     Right Ventricle: The right ventricle is normal in size. Global right ventricular systolic function appears normal. Tricuspid annular plane systolic excursion (TAPSE) is 1.5 cm. The estimated PA systolic pressure is 40 mmHg.     Aortic Valve:  The aortic valve is normal in structure. The mean gradient obtained across the aortic valve is 7 mmHg.     Mitral Valve:  The mitral valve is  mildly sclerotic. The pressure half time is 62 msec. The calculated mitral valve area is 3.55 cm2. There is mild to moderate mitral regurgitation. There is mitral annular calcification.     Tricuspid Valve:  The tricuspid valve is normal in structure. There is mild tricuspid regurgitation.     Pulmonary Valve:  The pulmonic valve is not well seen.     Pericardium: There is evidence of a trivial pericardial effusion.     IVC: IVC is enlarged but collapses > 50% with a sniff, suggesting intermediate right atrial pressure of 8 mmHg.     Intracavitary: There is no evidence of intracavity mass, thrombi, or vegetation.         CONCLUSIONS     1 - Normal left ventricular systolic function (EF 55-60%).     2 - Normal left ventricular diastolic function.     3 - Normal right ventricular systolic function .     4 - Mild to moderate mitral regurgitation.     5 - Concentric hypertrophy.             This document has been electronically    SIGNED BY: Pete Durán MD On: 10/09/2019 20:00      Pete Durán MD   Physician   Cardiology   Op Note   Signed                       []Hide copied text    []Hover for details  [facility]   Cardiac   Procedure Note     SUMMARY      Shirley Bettencourt [place]  [id number]  Yandy Terrell MD     Date of Procedure: 10/10/2019     Procedure:  STACEY     Provider: Pete Durán MD     Indications: She was referred for STACEY for a fib cardioversion.     Pre-Procedure Diagnosis: Atrial Fibrillation     Post-Procedure Diagnosis:  Left atrial appendage thrombus     Anesthesia: Monitor Anesthesia Care     Description of the Findings of the Procedure:      The risks, benefits, complications, treatment options, and expected outcomes were discussed with the patient. The patient and/or family concurred with the proposed plan, giving informed consent. Patient was brought to the cath lab after IV hydration was begun and oral premedication was given.     Findings:  Left atrial appendage  thrombus  Normal LVEF  See report for full details.     Complications: None; patient tolerated the procedure well.     Estimated Blood Loss (EBL): None           Implants: None     Specimens: None     Condition: stable     Disposition: PACU - hemodynamically stable.     Attestation: I was present and scrubbed for the entire procedure.      Recommendations:     Usual post STACEY care.  Continue anticoagulation with Eliqus and consider repeat STACEY guided cardioversion at later date.               Pending Diagnostic Studies:     None         Medications:  Reconciled Home Medications:      Medication List      START taking these medications    diltiaZEM 120 MG Cp24  Commonly known as:  CARDIZEM CD  Take 1 capsule (120 mg total) by mouth once daily.     ondansetron 8 MG Tbdl  Commonly known as:  ZOFRAN-ODT  Take 1 tablet (8 mg total) by mouth every 8 (eight) hours as needed.        CONTINUE taking these medications    acetaminophen 500 MG tablet  Commonly known as:  TYLENOL  Take 500 mg by mouth daily as needed. Taking 1 to 3     ALPRAZolam 0.5 MG tablet  Commonly known as:  XANAX  TAKE 1/2 - 1 TABLET BY MOUTH NIGHTLY FOR SLEEP OR ANXIETY     apixaban 2.5 mg Tab  Commonly known as:  ELIQUIS  Take 1 tablet (2.5 mg total) by mouth 2 (two) times daily.     coenzyme Q10 200 mg capsule  Take 400 mg by mouth once daily.     fish oil-omega-3 fatty acids 300-1,000 mg capsule  Take 2 g by mouth 2 (two) times daily.     LOTEMAX 0.5 % Drpg  Generic drug:  loteprednol etabonate     metoprolol tartrate 50 MG tablet  Commonly known as:  LOPRESSOR  Take 2 tablets (100 mg total) by mouth 2 (two) times daily.     MIRALAX ORAL  Take by mouth as needed.     RHOPRESSA 0.02 % Drop  Generic drug:  netarsudil     TRAVATAN Z 0.004 % ophthalmic solution  Generic drug:  travoprost  PLACE 1 DROP INTO THE LEFT EYE EVERY EVENING.     VITAMIN D3 2,000 unit Cap  Generic drug:  cholecalciferol (vitamin D3)  Take 1 capsule by mouth once daily.        STOP  taking these medications    ALPHA LIPOIC ACID ORAL     B complex-vitamin C-folic acid 0.8 mg Tab  Commonly known as:  LEVY-REG/NEPHRO-REG     BOSWELLIA LASHAWN XT (BULK) MISC     CALCIUM CITRATE ORAL     CHROMIUM ORAL     hydroCHLOROthiazide 12.5 MG Tab  Commonly known as:  HYDRODIURIL     losartan 50 MG tablet  Commonly known as:  COZAAR     pyridoxine (vitamin B6) 100 MG Tab  Commonly known as:  B-6     TURMERIC ORAL     vitamin E 400 UNIT capsule            Indwelling Lines/Drains at time of discharge:   Lines/Drains/Airways     None                 Time spent on the discharge of patient: 43 minutes  Patient was seen and examined on the date of discharge and determined to be suitable for discharge.         Emanuel Kwok MD  Department of Hospital Medicine  [facility]

## 2019-10-14 ENCOUNTER — PATIENT OUTREACH (OUTPATIENT)
Dept: ADMINISTRATIVE | Facility: CLINIC | Age: 84
End: 2019-10-14

## 2019-10-14 ENCOUNTER — PATIENT MESSAGE (OUTPATIENT)
Dept: INTERNAL MEDICINE | Facility: CLINIC | Age: 84
End: 2019-10-14

## 2019-10-14 NOTE — PATIENT INSTRUCTIONS
Discharge Instructions for Atrial Fibrillation  You have been diagnosed with an abnormal heart rhythm called atrial fibrillation. With this condition, your hearts 2 upper chambers quiver rather than squeeze the blood out in a normal pattern. This leads to an irregular and sometimes rapid heartbeat. Some people will develop associated symptoms such as a flip-flopping heartbeat, chest pain, lightheadedness, or shortness of breath. Other people may have no symptoms at all. Atrial fibrillation is serious because it affects the hearts ability to fill with blood as it should. Blood clots may form. This increases the risk for stroke. Untreated atrial fibrillation can also lead to heart failure. Atrial fibrillation can be controlled. With treatment, most people with atrial fibrillation lead normal lives.  Treatment options  Recommended treatment for atrial fibrillation depends on your age, symptoms, how long you have had atrial fibrillation, and other factors. You will have a complete evaluation to find out if you have any abnormalities that caused your heart to go into atrial fibrillation. This might be blocked heart arteries or a thyroid problem. Your doctor will assess your particular case and discuss choices with you.  Treatment choices may include:  · Treating an underlying disorder that puts you at risk for atrial fibrillation. For example, correcting an abnormal thyroid or electrolyte problem, or treating a blocked heart artery.  · Restoring a normal heart rhythm with an electrical shock (cardioversion) or with an antiarrhythmic medicine (chemical cardioversion)  · Using medicine to control your heart rate in atrial fibrillation.  · Preventing the risk for blood clot and stroke using blood-thinning medicines. Your doctor will tell you what he or she recommends. Choices may include aspirin, clopidogrel, warfarin, dabigatran, rivaroxaban, apixaban, and edoxaban.  · Doing catheter ablation or a surgical maze  procedure. These use different methods to destroy certain areas of heart tissue. This interrupts the electrical signals causing atrial fibrillation. One of these procedures may be a choice when medicines do not work, or as an alternative to long-term medicine.  · Other treatment choices may be recommended for you by your doctor.  Managing risk factors for stroke and preventing heart failure are important parts of any treatment plan for atrial fibrillation.  Home care  · Take your medicines exactly as directed. Dont skip doses.  · Work with your doctor to find the right medicines and doses for you.  · Learn to take your own pulse. Keep a record of your results. Ask your doctor which pulse rates mean that you need medical attention. Slowing your pulse is often the goal of treatment. Ask your doctor if its OK for you to use an automatic machine to check your pulse at home. Sometimes these machines dont count the pulse correctly when you have atrial fibrillation.  · Limit your intake of coffee, tea, cola, and other beverages with caffeine. Talk with your doctor about whether you should eliminate caffeine.  · Avoid over-the-counter medicines that have caffeine in them.  · Let your doctor know what medicines you take, including prescription and over-the-counter medicines, as well as any supplements. They interfere with some medicines given for atrial fibrillation.  · Ask your doctor about whether you can drink alcohol. Some people need to avoid alcohol to better treat atrial fibrillation. If you are taking blood-thinner medicines, alcohol may interfere with them by increasing their effect.  · Never take stimulants such as amphetamines or cocaine. These drugs can speed up your heart rate and trigger atrial fibrillation.  Follow-up care  Follow up with your doctor, or as advised.     When should I call my healthcare provider  Call your healthcare provider right away if you have any of the  following:  · Weakness  · Dizziness  · Fainting  · Fatigue  · Shortness of breath  · Chest pain with increased activity  · A change in the usual regularity of your heartbeat, or an unusually fast heartbeat   Date Last Reviewed: 4/23/2016  © 1477-2710 [employer]. [address] [place] [address], [place] [address]. All rights reserved. This information is not intended as a substitute for professional medical care. Always follow your healthcare professional's instructions.

## 2019-10-14 NOTE — PLAN OF CARE
Oct15 Follow Up/Office Visit with Samy Castillo MD   Tuesday Oct 15, 2019 3:40 PM   Arrive at check-in approximately 15 minutes before your scheduled appointment time. Bring all outside medical records and imaging, along with a list of your current medications and insurance card.  3rd Floor - From [address], take the [facility] exit ([address]). Enter the facility from the [address]  [facility]   [address] [facility] [address]  [place] [address]   [phone]         10/14/19 0724   Final Note   Assessment Type Final Discharge Note   Anticipated Discharge Disposition Home   Hospital Follow Up  Appt(s) scheduled? Yes   Right Care Referral Info   Post Acute Recommendation No Care

## 2019-10-15 ENCOUNTER — OFFICE VISIT (OUTPATIENT)
Dept: CARDIOLOGY | Facility: CLINIC | Age: 84
End: 2019-10-15
Payer: MEDICARE

## 2019-10-15 VITALS
DIASTOLIC BLOOD PRESSURE: 60 MMHG | HEART RATE: 86 BPM | WEIGHT: 177.69 LBS | SYSTOLIC BLOOD PRESSURE: 134 MMHG | BODY MASS INDEX: 30.34 KG/M2 | HEIGHT: 64 IN | OXYGEN SATURATION: 97 %

## 2019-10-15 DIAGNOSIS — I51.3 THROMBUS OF LEFT ATRIAL APPENDAGE: ICD-10-CM

## 2019-10-15 DIAGNOSIS — I49.3 PVC'S (PREMATURE VENTRICULAR CONTRACTIONS): ICD-10-CM

## 2019-10-15 DIAGNOSIS — I50.32 CHRONIC DIASTOLIC CONGESTIVE HEART FAILURE: ICD-10-CM

## 2019-10-15 DIAGNOSIS — I10 ESSENTIAL HYPERTENSION: Chronic | ICD-10-CM

## 2019-10-15 DIAGNOSIS — I48.91 NEW ONSET A-FIB: Primary | ICD-10-CM

## 2019-10-15 DIAGNOSIS — E78.49 OTHER HYPERLIPIDEMIA: Chronic | ICD-10-CM

## 2019-10-15 PROCEDURE — 99999 PR PBB SHADOW E&M-EST. PATIENT-LVL III: ICD-10-PCS | Mod: PBBFAC,HCNC,, | Performed by: INTERNAL MEDICINE

## 2019-10-15 PROCEDURE — 99999 PR PBB SHADOW E&M-EST. PATIENT-LVL III: CPT | Mod: PBBFAC,HCNC,, | Performed by: INTERNAL MEDICINE

## 2019-10-15 PROCEDURE — 99214 PR OFFICE/OUTPT VISIT, EST, LEVL IV, 30-39 MIN: ICD-10-PCS | Mod: HCNC,S$GLB,, | Performed by: INTERNAL MEDICINE

## 2019-10-15 PROCEDURE — 1101F PR PT FALLS ASSESS DOC 0-1 FALLS W/OUT INJ PAST YR: ICD-10-PCS | Mod: HCNC,CPTII,S$GLB, | Performed by: INTERNAL MEDICINE

## 2019-10-15 PROCEDURE — 1101F PT FALLS ASSESS-DOCD LE1/YR: CPT | Mod: HCNC,CPTII,S$GLB, | Performed by: INTERNAL MEDICINE

## 2019-10-15 PROCEDURE — 99214 OFFICE O/P EST MOD 30 MIN: CPT | Mod: HCNC,S$GLB,, | Performed by: INTERNAL MEDICINE

## 2019-10-15 RX ORDER — BUMETANIDE 1 MG/1
1 TABLET ORAL DAILY
Qty: 30 TABLET | Refills: 11 | Status: SHIPPED | OUTPATIENT
Start: 2019-10-15 | End: 2019-12-17 | Stop reason: SDUPTHER

## 2019-10-15 NOTE — PROGRESS NOTES
Subjective:   Patient ID:  Shirley Metz is a 86 y.o. female who presents for follow up of Atrial Fibrillation (F/u)      87 yo female. came in for afib. F/u at cardiology service  PMH Persistent afib, + positive SELVIN, HTN, HLD chfpEF and CKD   echo showed EF 60%. LVH   Nulcear stress test no ischemia    10/07 saw pt in the office for C/o recent weakness,  stomach upset,. Has fast pulse. Some dizziness, and Monet. ekg showed new onset afib with RVR. Increased Mwetoprolol to 100 mg bid and add cardizem and eliquis 2.5 m bid.  10/09, admitted to [facility] for chest pain. Troponin 0.035 chronic elevation. Afib with RVR  . Echo showed normal EF and mild MR> Next day, STACEY showed + SELVIN thrombus and DCCV cancelled.   Today, still has fatigue, leg swelling, imbalanced,   Chronic MONET  And sleeps with two pillows.   No chest pain and faint.            Past Medical History:   Diagnosis Date    Anemia 11/29/2018    Anxiety 11/28/2017    Arthritis     Atrial fibrillation with RVR 10/9/2019    Back pain     Basal cell carcinoma 03/29/2018    left mid back    Chronic diastolic congestive heart failure 10/15/2019    Colon polyps     reported per patient.     Fuchs' corneal dystrophy     General anesthetics causing adverse effect in therapeutic use     woke up during surgery and gagged on ET tube    Glaucoma     Glaucoma     Heart failure with preserved left ventricular function (HFpEF) 7/24/2018    Hyperlipidemia     Hypertension     Macular degeneration dry    Obesity     Squamous cell carcinoma 01/08/2019    left shoulder    Trouble in sleeping     Urinary tract infection        Past Surgical History:   Procedure Laterality Date    ADENOIDECTOMY  1938    BREAST BIOPSY Left     1997. benign    BREAST CYST EXCISION Left 1997 approx    CARPAL TUNNEL RELEASE Right 2012 approx    CATARACT EXTRACTION Bilateral 1994    CHOLECYSTECTOMY  1975    CORNEAL TRANSPLANT Bilateral 08/2015     8/2015 - left; Right 4/2016    EYE SURGERY      Fuch's Corneal dystrophy - had 2nd operation with Dr. Quintanilla    EYE SURGERY      Cataract wIOL    left thumb Left 2011    removed cuboid for arthritis    REVERSE TOTAL SHOULDER ARTHROPLASTY Left 8/21/2018    Procedure: ARTHROPLASTY, SHOULDER, TOTAL, REVERSE;  Surgeon: Solomon Lujan MD;  Location: [facility] OR;  Service: Orthopedics;  Laterality: Left;  WITH BICEP TENODESIS    SKIN CANCER EXCISION Left 2017    BCC removal by Dr. Valadez    SLT- OS (aka TRABECULOPLASTY) Left 05/11/2011    repeat 3/14/12    TENOTOMY Left 8/21/2018    Procedure: TENOTOMY;  Surgeon: Solomon Lujan MD;  Location: [facility] OR;  Service: Orthopedics;  Laterality: Left;    TONSILLECTOMY  1938       Social History     Tobacco Use    Smoking status: Never Smoker    Smokeless tobacco: Never Used    Tobacco comment: history of passive smoking from her ex-   Substance Use Topics    Alcohol use: Yes     Alcohol/week: 2.0 standard drinks     Types: 2 Standard drinks or equivalent per week     Comment: occasional     Drug use: No       Family History   Problem Relation Age of Onset    Heart disease Mother     Hypertension Mother     Cancer Father 88        sarcoma( ?Kaposi's ) on leg    Deep vein thrombosis Neg Hx     Ovarian cancer Neg Hx     Breast cancer Neg Hx     Kidney disease Neg Hx     Strabismus Neg Hx     Retinal detachment Neg Hx     Macular degeneration Neg Hx     Glaucoma Neg Hx     Blindness Neg Hx     Amblyopia Neg Hx     Eczema Neg Hx     Lupus Neg Hx     Melanoma Neg Hx     Psoriasis Neg Hx     Diabetes Neg Hx     Stroke Neg Hx     Mental retardation Neg Hx     Mental illness Neg Hx     Hyperlipidemia Neg Hx     COPD Neg Hx     Asthma Neg Hx     Depression Neg Hx     Alcohol abuse Neg Hx     Drug abuse Neg Hx          Review of Systems   Constitution: Positive for malaise/fatigue. Negative for decreased appetite, diaphoresis, fever and  night sweats.   HENT: Negative for nosebleeds.    Eyes: Negative for blurred vision and double vision.   Cardiovascular: Positive for dyspnea on exertion and palpitations. Negative for chest pain, claudication, irregular heartbeat, leg swelling, near-syncope, orthopnea, paroxysmal nocturnal dyspnea and syncope.   Respiratory: Negative for cough, shortness of breath, sleep disturbances due to breathing, snoring, sputum production and wheezing.    Endocrine: Negative for cold intolerance and polyuria.   Hematologic/Lymphatic: Does not bruise/bleed easily.   Skin: Negative for rash.   Musculoskeletal: Negative for back pain, falls, joint pain, joint swelling and neck pain.   Gastrointestinal: Positive for abdominal pain. Negative for heartburn, nausea and vomiting.   Genitourinary: Negative for dysuria, frequency and hematuria.   Neurological: Negative for difficulty with concentration, dizziness, focal weakness, headaches, light-headedness, numbness, seizures and weakness.   Psychiatric/Behavioral: Negative for depression, memory loss and substance abuse. The patient does not have insomnia.    Allergic/Immunologic: Negative for HIV exposure and hives.       Objective:   Physical Exam   Constitutional: She is oriented to person, place, and time. She appears well-nourished.   HENT:   Head: Normocephalic.   Eyes: Pupils are equal, round, and reactive to light.   Neck: Normal carotid pulses and no JVD present. Carotid bruit is not present. No thyromegaly present.   Cardiovascular: Normal heart sounds and normal pulses. An irregularly irregular rhythm present.  No extrasystoles are present. Tachycardia present. PMI is not displaced. Exam reveals no gallop and no S3.   No murmur heard.  Pulmonary/Chest: Breath sounds normal. No stridor. No respiratory distress.   Abdominal: Soft. Bowel sounds are normal. There is no tenderness. There is no rebound.   Musculoskeletal: Normal range of motion.   Neurological: She is alert and  oriented to person, place, and time.   Skin: Skin is intact. No rash noted.   Psychiatric: Her behavior is normal.       Lab Results   Component Value Date    CHOL 233 (H) 08/01/2019    CHOL 180 03/16/2018    CHOL 181 02/19/2018     Lab Results   Component Value Date    HDL 49 08/01/2019    HDL 43 03/16/2018    HDL 45 02/19/2018     Lab Results   Component Value Date    LDLCALC 155.0 08/01/2019    LDLCALC 117.6 03/16/2018    LDLCALC 118.2 02/19/2018     Lab Results   Component Value Date    TRIG 145 08/01/2019    TRIG 97 03/16/2018    TRIG 89 02/19/2018     Lab Results   Component Value Date    CHOLHDL 21.0 08/01/2019    CHOLHDL 23.9 03/16/2018    CHOLHDL 24.9 02/19/2018       Chemistry        Component Value Date/Time     10/11/2019 0410    K 4.2 10/11/2019 0410     10/11/2019 0410    CO2 24 10/11/2019 0410    BUN 46 (H) 10/11/2019 0410    CREATININE 1.6 (H) 10/11/2019 0410     (H) 10/11/2019 0410        Component Value Date/Time    CALCIUM 9.6 10/11/2019 0410    ALKPHOS 76 10/09/2019 0203    AST 42 (H) 10/09/2019 0203    ALT 48 (H) 10/09/2019 0203    BILITOT 0.5 10/09/2019 0203    ESTGFRAFRICA 33 (A) 10/11/2019 0410    EGFRNONAA 29 (A) 10/11/2019 0410          Lab Results   Component Value Date    HGBA1C 6.0 (H) 08/01/2019     Lab Results   Component Value Date    TSH 0.849 10/09/2019     Lab Results   Component Value Date    INR 1.0 01/25/2018    INR 1.0 01/01/2017    INR 1.0 02/09/2016     Lab Results   Component Value Date    WBC 8.57 10/11/2019    HGB 10.4 (L) 10/11/2019    HCT 32.6 (L) 10/11/2019    MCV 99 (H) 10/11/2019     10/11/2019     BMP  Sodium   Date Value Ref Range Status   10/11/2019 139 136 - 145 mmol/L Final     Potassium   Date Value Ref Range Status   10/11/2019 4.2 3.5 - 5.1 mmol/L Final     Chloride   Date Value Ref Range Status   10/11/2019 104 95 - 110 mmol/L Final     CO2   Date Value Ref Range Status   10/11/2019 24 23 - 29 mmol/L Final     BUN, Bld   Date Value  Ref Range Status   10/11/2019 46 (H) 8 - 23 mg/dL Final     Creatinine   Date Value Ref Range Status   10/11/2019 1.6 (H) 0.5 - 1.4 mg/dL Final     Calcium   Date Value Ref Range Status   10/11/2019 9.6 8.7 - 10.5 mg/dL Final     Anion Gap   Date Value Ref Range Status   10/11/2019 11 8 - 16 mmol/L Final     eGFR if    Date Value Ref Range Status   10/11/2019 33 (A) >60 mL/min/1.73 m^2 Final     eGFR if non    Date Value Ref Range Status   10/11/2019 29 (A) >60 mL/min/1.73 m^2 Final     Comment:     Calculation used to obtain the estimated glomerular filtration  rate (eGFR) is the CKD-EPI equation.        BNP  @LABRCNTIP(BNP,BNPTRIAGEBLO)@  @LABRCNTIP(troponini)@  Estimated Creatinine Clearance: 25.9 mL/min (A) (based on SCr of 1.6 mg/dL (H)).  No results found in the last 24 hours.  No results found in the last 24 hours.  No results found in the last 24 hours.    Assessment:      1. New onset a-fib    2. Thrombus of left atrial appendage    3. Chronic diastolic congestive heart failure        Plan:   Add Bumex 1 mg daily for CHF  Repeat BNP and BMP in 1 week  Due to SELVIN thrombus ,eliquis 10 mg bid for 7 days and then 2.5 mg bid  Continue metoprolol 100 mg bid and cardizem 120 mg   DASH  RTC in 4 weeks

## 2019-10-23 ENCOUNTER — LAB VISIT (OUTPATIENT)
Dept: LAB | Facility: HOSPITAL | Age: 84
End: 2019-10-23
Attending: INTERNAL MEDICINE
Payer: MEDICARE

## 2019-10-23 DIAGNOSIS — I50.32 CHRONIC DIASTOLIC CONGESTIVE HEART FAILURE: ICD-10-CM

## 2019-10-23 LAB
ANION GAP SERPL CALC-SCNC: 10 MMOL/L (ref 8–16)
BNP SERPL-MCNC: 599 PG/ML (ref 0–99)
BUN SERPL-MCNC: 31 MG/DL (ref 8–23)
CALCIUM SERPL-MCNC: 9.4 MG/DL (ref 8.7–10.5)
CHLORIDE SERPL-SCNC: 106 MMOL/L (ref 95–110)
CO2 SERPL-SCNC: 27 MMOL/L (ref 23–29)
CREAT SERPL-MCNC: 1.4 MG/DL (ref 0.5–1.4)
EST. GFR  (AFRICAN AMERICAN): 39.2 ML/MIN/1.73 M^2
EST. GFR  (NON AFRICAN AMERICAN): 34 ML/MIN/1.73 M^2
GLUCOSE SERPL-MCNC: 113 MG/DL (ref 70–110)
POTASSIUM SERPL-SCNC: 4 MMOL/L (ref 3.5–5.1)
SODIUM SERPL-SCNC: 143 MMOL/L (ref 136–145)

## 2019-10-23 PROCEDURE — 80048 BASIC METABOLIC PNL TOTAL CA: CPT | Mod: HCNC

## 2019-10-23 PROCEDURE — 83880 ASSAY OF NATRIURETIC PEPTIDE: CPT | Mod: HCNC

## 2019-10-23 PROCEDURE — 36415 COLL VENOUS BLD VENIPUNCTURE: CPT | Mod: HCNC

## 2019-10-24 ENCOUNTER — PATIENT MESSAGE (OUTPATIENT)
Dept: CARDIOLOGY | Facility: CLINIC | Age: 84
End: 2019-10-24

## 2019-10-24 ENCOUNTER — TELEPHONE (OUTPATIENT)
Dept: CARDIOLOGY | Facility: CLINIC | Age: 84
End: 2019-10-24

## 2019-10-24 NOTE — TELEPHONE ENCOUNTER
Patient contacted and was advised that her;Lab stable   Continue current rx    Patient stated understanding with no questions or concerns.

## 2019-10-24 NOTE — TELEPHONE ENCOUNTER
Patient contacted and wanted to know if the BMP lab results come with any restrictions they are out of range. Patient viewed them online and would like to be advised.  Previous message:  I am concerned about my blood test results and assume I will be hearing from you about them.  I have finished the high doses of Eliquis and back on the 2.5%.  I have averaged up my blood pressure results over that time and wanted to get them to you   :         115/77   pulse 97.  Please advise.

## 2019-10-30 ENCOUNTER — OFFICE VISIT (OUTPATIENT)
Dept: INTERNAL MEDICINE | Facility: CLINIC | Age: 84
End: 2019-10-30
Payer: MEDICARE

## 2019-10-30 VITALS
HEIGHT: 64 IN | OXYGEN SATURATION: 98 % | WEIGHT: 170.63 LBS | TEMPERATURE: 97 F | DIASTOLIC BLOOD PRESSURE: 86 MMHG | HEART RATE: 105 BPM | BODY MASS INDEX: 29.13 KG/M2 | SYSTOLIC BLOOD PRESSURE: 139 MMHG

## 2019-10-30 DIAGNOSIS — M79.662 BILATERAL CALF PAIN: ICD-10-CM

## 2019-10-30 DIAGNOSIS — I48.91 ATRIAL FIBRILLATION, UNSPECIFIED TYPE: Primary | ICD-10-CM

## 2019-10-30 DIAGNOSIS — I51.3 THROMBUS OF LEFT ATRIAL APPENDAGE: ICD-10-CM

## 2019-10-30 DIAGNOSIS — M79.661 BILATERAL CALF PAIN: ICD-10-CM

## 2019-10-30 PROCEDURE — 1101F PR PT FALLS ASSESS DOC 0-1 FALLS W/OUT INJ PAST YR: ICD-10-PCS | Mod: HCNC,CPTII,S$GLB, | Performed by: FAMILY MEDICINE

## 2019-10-30 PROCEDURE — 99999 PR PBB SHADOW E&M-EST. PATIENT-LVL III: CPT | Mod: PBBFAC,HCNC,, | Performed by: FAMILY MEDICINE

## 2019-10-30 PROCEDURE — 99213 OFFICE O/P EST LOW 20 MIN: CPT | Mod: HCNC,S$GLB,, | Performed by: FAMILY MEDICINE

## 2019-10-30 PROCEDURE — 1101F PT FALLS ASSESS-DOCD LE1/YR: CPT | Mod: HCNC,CPTII,S$GLB, | Performed by: FAMILY MEDICINE

## 2019-10-30 PROCEDURE — 99213 PR OFFICE/OUTPT VISIT, EST, LEVL III, 20-29 MIN: ICD-10-PCS | Mod: HCNC,S$GLB,, | Performed by: FAMILY MEDICINE

## 2019-10-30 PROCEDURE — 99999 PR PBB SHADOW E&M-EST. PATIENT-LVL III: ICD-10-PCS | Mod: PBBFAC,HCNC,, | Performed by: FAMILY MEDICINE

## 2019-10-30 PROCEDURE — 99499 RISK ADDL DX/OHS AUDIT: ICD-10-PCS | Mod: S$GLB,,, | Performed by: FAMILY MEDICINE

## 2019-10-30 PROCEDURE — 99499 UNLISTED E&M SERVICE: CPT | Mod: S$GLB,,, | Performed by: FAMILY MEDICINE

## 2019-10-30 NOTE — PROGRESS NOTES
Subjective:       Patient ID: Shirley Metz is a 86 y.o. female.    Chief Complaint: follow up from hospital    Patient here for hospital follow-up.  She was seen here 10/07/2019 and diagnosed with new onset atrial fibrillation.  Seen by Cardiology and placed on Eliquis.  Then hospitalized 10/10 through 10/13/2019 experiencing atrial flutter at presentation to ED.  She was also found to have a left atrial thrombus no cardioversion therefore was performed.  She was discharged on Eliquis 2.5 b.i.d., metoprolol 50 two b.i.d., diltiazem 120 mg. She is not taking diltiazem - her understanding at her cardiology F/U was to take just eliquis, metoprolol and bumex. In his note, he sts she should be taking it.    Hypertension Medications        bumetanide (BUMEX) 1 MG tablet Take 1 tablet (1 mg total) by mouth once daily.    metoprolol tartrate (LOPRESSOR) 50 MG tablet Take 2 tablets (100 mg total) by mouth 2 (two) times daily.    diltiaZEM (CARDIZEM CD) 120 MG Cp24 Take 1 capsule (120 mg total) by mouth once daily. - not taking since 10/15/19     Reviewed her BP readings which she brings in - they are a little higher since she stopped the diltiazem - running 120s-130s/60s-90s - prior was [id number]/120s/60s-70s for the few days post hospital. She stopped taking the diltiazem 10/15/19.    C/O sore legs R>L and muscle spasm on standing up R>L. Note Mg was WNL - K WNL last check. Heating pad helps. She did stop all her supplements after being put on eliquis    Lab Results       Component                Value               Date                       WBC                      8.57                10/11/2019                 HGB                      10.4 (L)            10/11/2019                 HCT                      32.6 (L)            10/11/2019                 PLT                      214                 10/11/2019                 CHOL                     233 (H)             08/01/2019                 TRIG                      145                 08/01/2019                 HDL                      49                  08/01/2019                 LDLCALC                  155.0               08/01/2019                 ALT                      48 (H)              10/09/2019                 AST                      42 (H)              10/09/2019                 NA                       143                 10/23/2019                 K                        4.0                 10/23/2019                 CL                       106                 10/23/2019                 CALCIUM                  9.4                 10/23/2019                 CREATININE               1.4                 10/23/2019                 BUN                      31 (H)              10/23/2019                 CO2                      27                  10/23/2019                 TSH                      0.849               10/09/2019                 INR                      1.0                 01/25/2018                 GLU                      113 (H)             10/23/2019                 ESTGFRAFRICA             39.2 (A)            10/23/2019                 EGFRNONAA                34.0 (A)            10/23/2019                 HGBA1C                   6.0 (H)             08/01/2019      TCC elements:  Family and/or Caretaker present at visit?  No.  Diagnostic tests reviewed/disposition: I have reviewed all completed as well as pending diagnostic tests at the time of discharge.  Disease/illness education: reviewed need for K+ foods  Home health/community services discussion/referrals: Patient does not have home health established from hospital visit.  They do not need home health.  If needed, we will set up home health for the patient.   Establishment or re-establishment of referral orders for community resources: No other necessary community resources.   Discussion with other health care providers: No discussion with other health care providers  necessary.     Review of Systems   Respiratory: Negative for shortness of breath and wheezing.    Cardiovascular: Negative for chest pain, palpitations and leg swelling.   Psychiatric/Behavioral: Negative for sleep disturbance.       Objective:      Physical Exam   Constitutional: She is oriented to person, place, and time. She appears well-developed.   HENT:   Head: Normocephalic and atraumatic.   Cardiovascular: Normal rate, regular rhythm and normal heart sounds.   Pulmonary/Chest: Effort normal and breath sounds normal.   Musculoskeletal: She exhibits no edema.   Neurological: She is alert and oriented to person, place, and time.   Skin: Skin is warm and dry.   Psychiatric: She has a normal mood and affect. Her behavior is normal.         Assessment/Plan:     1. Atrial fibrillation, unspecified type     2. Thrombus of left atrial appendage     3. Bilateral calf pain      Continue to follow with Cardiology.  K+ rich foods reviewed with pt/list given. Recheck early Jan with BMP at that time.  More than 50% of visit was spent in counseling regarding options, recommendations, medication use, side effects, expectations, and precautions.  Total time spent face-to-face was 20 minutes.

## 2019-10-30 NOTE — PATIENT INSTRUCTIONS
Potassium-Rich Foods  The normal adult diet usually contains 2,000 mg to 4,000 mg of potassium per day. More potassium is needed when you lose too much potassium from your body. This can happen if you have diarrhea or vomiting. It can also happen if you take a medicine to make you urinate more (diuretic). To increase the amount of potassium in your diet, include these high-potassium foods.     [The (*) indicates foods highest in potassium.]  Vegetables  Artichokes. Cooked 1/2 cup, 200 mg to 300 mg*  Asparagus. Cooked 1/2 cup, 200 mg to 300 mg  Beans. White, red, macias cooked 1/2 cup, 300 mg to 500 mg*  Beets. Cooked 1/2 cup, 200 mg to 300 mg  Broccoli. Cooked or raw 1 cup, 200 mg to 500 mg*  [place] sprouts. Cooked 1/2 cup, 200 mg to 300 mg  Cabbage. Raw 1 cup, 100 mg to 200 mg  Carrots. Raw or cooked 1/2 cup, 100 mg to 200 mg  Celery. Raw 1 cup, 200 mg to 300 mg  Lima beans. Fresh or frozen 1/2 cup, 300 mg to 500 mg*   Mushrooms. Raw or cooked 1/2 cup, 100 mg to 300 mg  Peas. Cooked 1/2 cup, 150 mg to 250 mg   Potatoes. Baked 1 medium, 500 mg to 900 mg*   Spinach. Cooked 1 cup, 800 mg to 900 mg*   Spinach. Raw 2 cups, 300 mg to 400 mg *  Squash, winter. Fresh, frozen, or cooked 1/2 cup, 200 mg to 400 mg   Tomato. Fresh 1 medium, 200 mg to 300 mg   Tomato juice. Canned 1/2 cup, 200 mg to 300 mg   Fruits  Apple juice. Unsweetened 1 cup, 200 mg to 300 mg   Apricots. Canned 1/2 cup, 200 mg to 300 mg   Apricots. Dried 4 pieces, 100 mg to 200 mg   Avocado. Raw 1/2 cup, 300 mg to 400 mg*  Banana. Fresh 1 small, 300 mg to 400 mg*   Cantaloupe. Fresh 1 cup diced, 300 mg to 400 mg*   Grape juice. Unsweetened 1 cup, 200 mg to 300 mg   Honeydew melon. Fresh 1 cup diced, 300 mg to 400 mg*   Orange. Fresh 1 medium, 200 mg to 300 mg    Orange juice. Unsweetened, fresh or frozen 1/2 cup, 200 mg to 300 mg  Pineapple juice. Unsweetened 1 cup, 300 mg to 400 mg   Prune juice. Unsweetened 1/2 cup, 300 mg to 400 mg*   Prunes. Dried 5  pieces, 300 mg to 400 mg*   Strawberries. Fresh or frozen 1 cup, 200 mg to 300 mg  Meat  Red meat. Cooked 3 ounces, 100 mg to 300 mg   Seafood  Cod, flounder, halibut. Cooked 3 ounces, 100 mg to 300 mg*  Lenore. Cooked, 3 ounces 300 mg to 400 mg*   Scallops. Cooked 3 ounces, 200 mg to 300 mg*  Shrimp. Cooked 3/4 cup, 100 mg to 200 mg   Tuna. Fresh or canned 3/4 cup, 200 mg to 500 mg   Date Last Reviewed: 10/1/2016  © 1371-8770 [employer]. [address] [place] [address], [place] [address]. All rights reserved. This information is not intended as a substitute for professional medical care. Always follow your healthcare professional's instructions.

## 2019-11-03 ENCOUNTER — PATIENT MESSAGE (OUTPATIENT)
Dept: OPHTHALMOLOGY | Facility: CLINIC | Age: 84
End: 2019-11-03

## 2019-11-11 ENCOUNTER — TELEPHONE (OUTPATIENT)
Dept: CARDIOLOGY | Facility: CLINIC | Age: 84
End: 2019-11-11

## 2019-11-11 ENCOUNTER — OFFICE VISIT (OUTPATIENT)
Dept: CARDIOLOGY | Facility: CLINIC | Age: 84
End: 2019-11-11
Payer: MEDICARE

## 2019-11-11 VITALS
BODY MASS INDEX: 29.62 KG/M2 | HEART RATE: 120 BPM | RESPIRATION RATE: 16 BRPM | SYSTOLIC BLOOD PRESSURE: 112 MMHG | WEIGHT: 173.5 LBS | OXYGEN SATURATION: 95 % | HEIGHT: 64 IN | DIASTOLIC BLOOD PRESSURE: 78 MMHG

## 2019-11-11 DIAGNOSIS — I48.91 NEW ONSET A-FIB: Primary | ICD-10-CM

## 2019-11-11 DIAGNOSIS — I51.3 THROMBUS OF LEFT ATRIAL APPENDAGE: ICD-10-CM

## 2019-11-11 DIAGNOSIS — I10 ESSENTIAL HYPERTENSION: Chronic | ICD-10-CM

## 2019-11-11 DIAGNOSIS — I50.32 CHRONIC DIASTOLIC CONGESTIVE HEART FAILURE: ICD-10-CM

## 2019-11-11 DIAGNOSIS — E66.9 OBESITY (BMI 30.0-34.9): ICD-10-CM

## 2019-11-11 DIAGNOSIS — E78.49 OTHER HYPERLIPIDEMIA: Chronic | ICD-10-CM

## 2019-11-11 PROCEDURE — 1101F PT FALLS ASSESS-DOCD LE1/YR: CPT | Mod: HCNC,CPTII,S$GLB, | Performed by: INTERNAL MEDICINE

## 2019-11-11 PROCEDURE — 99215 PR OFFICE/OUTPT VISIT, EST, LEVL V, 40-54 MIN: ICD-10-PCS | Mod: HCNC,S$GLB,, | Performed by: INTERNAL MEDICINE

## 2019-11-11 PROCEDURE — 99999 PR PBB SHADOW E&M-EST. PATIENT-LVL III: CPT | Mod: PBBFAC,HCNC,, | Performed by: INTERNAL MEDICINE

## 2019-11-11 PROCEDURE — 1101F PR PT FALLS ASSESS DOC 0-1 FALLS W/OUT INJ PAST YR: ICD-10-PCS | Mod: HCNC,CPTII,S$GLB, | Performed by: INTERNAL MEDICINE

## 2019-11-11 PROCEDURE — 99215 OFFICE O/P EST HI 40 MIN: CPT | Mod: HCNC,S$GLB,, | Performed by: INTERNAL MEDICINE

## 2019-11-11 PROCEDURE — 99999 PR PBB SHADOW E&M-EST. PATIENT-LVL III: ICD-10-PCS | Mod: PBBFAC,HCNC,, | Performed by: INTERNAL MEDICINE

## 2019-11-11 RX ORDER — LABETALOL 100 MG/1
100 TABLET, FILM COATED ORAL 2 TIMES DAILY
COMMUNITY
End: 2019-11-11 | Stop reason: ALTCHOICE

## 2019-11-11 NOTE — H&P (VIEW-ONLY)
Subjective:   Patient ID:  Shirley Metz is a 86 y.o. female who presents for follow up of Follow-up (4 wk.)      87 yo female. came in for afib. F/u at cardiology service  [facility] Persistent afib, + positive SELVIN, HTN, HLD chfpEF and CKD   echo showed EF 60%. LVH   Nulcear stress test no ischemia    10/07 saw pt in the office for C/o recent weakness,  stomach upset,. Has fast pulse. Some dizziness, and Monet. ekg showed new onset afib with RVR. Increased Mwetoprolol to 100 mg bid and add cardizem and eliquis 2.5 m bid.  10/09, admitted to [facility] for chest pain. Troponin 0.035 chronic elevation. Afib with RVR  . Echo showed normal EF and mild MR> Next day, STACEY showed + SELVIN thrombus and DCCV cancelled.     States that not taking cardizem for 4 weeks.  Log checked, BP and pulse controlled  Taking Bumex, eliquis 2.5 mg bid and Metoprolol. Could not tolerate Labetalol   Improved fatigue, leg swelling, imbalanced,   Chronic MONET  And sleeps with two pillows.   No chest pain and faint.          Past Medical History:   Diagnosis Date    Anemia 11/29/2018    Anxiety 11/28/2017    Arthritis     Atrial fibrillation with RVR 10/9/2019    Back pain     Basal cell carcinoma 03/29/2018    left mid back    Chronic diastolic congestive heart failure 10/15/2019    Colon polyps     reported per patient.     Fuchs' corneal dystrophy     General anesthetics causing adverse effect in therapeutic use     woke up during surgery and gagged on ET tube    Glaucoma     Glaucoma     Heart failure with preserved left ventricular function (HFpEF) 7/24/2018    Hyperlipidemia     Hypertension     Macular degeneration dry    Obesity     Squamous cell carcinoma 01/08/2019    left shoulder    Trouble in sleeping     Urinary tract infection        Past Surgical History:   Procedure Laterality Date    ADENOIDECTOMY  1938    BREAST BIOPSY Left     1997. benign    BREAST CYST EXCISION Left 1997 approx     CARPAL TUNNEL RELEASE Right 2012 approx    CATARACT EXTRACTION Bilateral 1994    CHOLECYSTECTOMY  1975    CORNEAL TRANSPLANT Bilateral 08/2015 8/2015 - left; Right 4/2016    EYE SURGERY      Fuch's Corneal dystrophy - had 2nd operation with Dr. Quintanilla    EYE SURGERY      Cataract wIOL    left thumb Left 2011    removed cuboid for arthritis    REVERSE TOTAL SHOULDER ARTHROPLASTY Left 8/21/2018    Procedure: ARTHROPLASTY, SHOULDER, TOTAL, REVERSE;  Surgeon: Solomon Lujan MD;  Location: [facility] OR;  Service: Orthopedics;  Laterality: Left;  WITH BICEP TENODESIS    SKIN CANCER EXCISION Left 2017    BCC removal by Dr. Valadez    SLT- OS (aka TRABECULOPLASTY) Left 05/11/2011    repeat 3/14/12    TENOTOMY Left 8/21/2018    Procedure: TENOTOMY;  Surgeon: Solomon Lujan MD;  Location: [facility] OR;  Service: Orthopedics;  Laterality: Left;    TONSILLECTOMY  1938    TREATMENT OF CARDIAC ARRHYTHMIA N/A 10/10/2019    Procedure: CARDIOVERSION;  Surgeon: Pete Durán MD;  Location: [facility] CATH LAB;  Service: Cardiology;  Laterality: N/A;       Social History     Tobacco Use    Smoking status: Never Smoker    Smokeless tobacco: Never Used    Tobacco comment: history of passive smoking from her ex-   Substance Use Topics    Alcohol use: Not Currently     Alcohol/week: 2.0 standard drinks     Types: 2 Standard drinks or equivalent per week     Frequency: Never     Comment: occasional     Drug use: No       Family History   Problem Relation Age of Onset    Heart disease Mother     Hypertension Mother     Cancer Father 88        sarcoma( ?Kaposi's ) on leg    Deep vein thrombosis Neg Hx     Ovarian cancer Neg Hx     Breast cancer Neg Hx     Kidney disease Neg Hx     Strabismus Neg Hx     Retinal detachment Neg Hx     Macular degeneration Neg Hx     Glaucoma Neg Hx     Blindness Neg Hx     Amblyopia Neg Hx     Eczema Neg Hx     Lupus Neg Hx     Melanoma Neg Hx     Psoriasis Neg Hx      Diabetes Neg Hx     Stroke Neg Hx     Mental retardation Neg Hx     Mental illness Neg Hx     Hyperlipidemia Neg Hx     COPD Neg Hx     Asthma Neg Hx     Depression Neg Hx     Alcohol abuse Neg Hx     Drug abuse Neg Hx          Review of Systems   Constitution: Positive for malaise/fatigue. Negative for decreased appetite, diaphoresis, fever and night sweats.   HENT: Negative for nosebleeds.    Eyes: Negative for blurred vision and double vision.   Cardiovascular: Positive for dyspnea on exertion and palpitations. Negative for chest pain, claudication, irregular heartbeat, leg swelling, near-syncope, orthopnea, paroxysmal nocturnal dyspnea and syncope.   Respiratory: Negative for cough, shortness of breath, sleep disturbances due to breathing, snoring, sputum production and wheezing.    Endocrine: Negative for cold intolerance and polyuria.   Hematologic/Lymphatic: Does not bruise/bleed easily.   Skin: Negative for rash.   Musculoskeletal: Negative for back pain, falls, joint pain, joint swelling and neck pain.   Gastrointestinal: Positive for abdominal pain. Negative for heartburn, nausea and vomiting.   Genitourinary: Negative for dysuria, frequency and hematuria.   Neurological: Negative for difficulty with concentration, dizziness, focal weakness, headaches, light-headedness, numbness, seizures and weakness.   Psychiatric/Behavioral: Negative for depression, memory loss and substance abuse. The patient does not have insomnia.    Allergic/Immunologic: Negative for HIV exposure and hives.       Objective:   Physical Exam   Constitutional: She is oriented to person, place, and time. She appears well-nourished.   HENT:   Head: Normocephalic.   Eyes: Pupils are equal, round, and reactive to light.   Neck: Normal carotid pulses and no JVD present. Carotid bruit is not present. No thyromegaly present.   Cardiovascular: Normal heart sounds and normal pulses. An irregularly irregular rhythm present.  No  extrasystoles are present. Tachycardia present. PMI is not displaced. Exam reveals no gallop and no S3.   No murmur heard.  Pulmonary/Chest: Breath sounds normal. No stridor. No respiratory distress.   Abdominal: Soft. Bowel sounds are normal. There is no tenderness. There is no rebound.   Musculoskeletal: Normal range of motion.   Neurological: She is alert and oriented to person, place, and time.   Skin: Skin is intact. No rash noted.   Psychiatric: Her behavior is normal.       Lab Results   Component Value Date    CHOL 233 (H) 08/01/2019    CHOL 180 03/16/2018    CHOL 181 02/19/2018     Lab Results   Component Value Date    HDL 49 08/01/2019    HDL 43 03/16/2018    HDL 45 02/19/2018     Lab Results   Component Value Date    LDLCALC 155.0 08/01/2019    LDLCALC 117.6 03/16/2018    LDLCALC 118.2 02/19/2018     Lab Results   Component Value Date    TRIG 145 08/01/2019    TRIG 97 03/16/2018    TRIG 89 02/19/2018     Lab Results   Component Value Date    CHOLHDL 21.0 08/01/2019    CHOLHDL 23.9 03/16/2018    CHOLHDL 24.9 02/19/2018       Chemistry        Component Value Date/Time     10/23/2019 0859    K 4.0 10/23/2019 0859     10/23/2019 0859    CO2 27 10/23/2019 0859    BUN 31 (H) 10/23/2019 0859    CREATININE 1.4 10/23/2019 0859     (H) 10/23/2019 0859        Component Value Date/Time    CALCIUM 9.4 10/23/2019 0859    ALKPHOS 76 10/09/2019 0203    AST 42 (H) 10/09/2019 0203    ALT 48 (H) 10/09/2019 0203    BILITOT 0.5 10/09/2019 0203    ESTGFRAFRICA 39.2 (A) 10/23/2019 0859    EGFRNONAA 34.0 (A) 10/23/2019 0859          Lab Results   Component Value Date    HGBA1C 6.0 (H) 08/01/2019     Lab Results   Component Value Date    TSH 0.849 10/09/2019     Lab Results   Component Value Date    INR 1.0 01/25/2018    INR 1.0 01/01/2017    INR 1.0 02/09/2016     Lab Results   Component Value Date    WBC 8.57 10/11/2019    HGB 10.4 (L) 10/11/2019    HCT 32.6 (L) 10/11/2019    MCV 99 (H) 10/11/2019      10/11/2019     BMP  Sodium   Date Value Ref Range Status   10/23/2019 143 136 - 145 mmol/L Final     Potassium   Date Value Ref Range Status   10/23/2019 4.0 3.5 - 5.1 mmol/L Final     Chloride   Date Value Ref Range Status   10/23/2019 106 95 - 110 mmol/L Final     CO2   Date Value Ref Range Status   10/23/2019 27 23 - 29 mmol/L Final     BUN, Bld   Date Value Ref Range Status   10/23/2019 31 (H) 8 - 23 mg/dL Final     Creatinine   Date Value Ref Range Status   10/23/2019 1.4 0.5 - 1.4 mg/dL Final     Calcium   Date Value Ref Range Status   10/23/2019 9.4 8.7 - 10.5 mg/dL Final     Anion Gap   Date Value Ref Range Status   10/23/2019 10 8 - 16 mmol/L Final     eGFR if    Date Value Ref Range Status   10/23/2019 39.2 (A) >60 mL/min/1.73 m^2 Final     eGFR if non    Date Value Ref Range Status   10/23/2019 34.0 (A) >60 mL/min/1.73 m^2 Final     Comment:     Calculation used to obtain the estimated glomerular filtration  rate (eGFR) is the CKD-EPI equation.        BNP  @LABRCNTIP(BNP,BNPTRIAGEBLO)@  @LABRCNTIP(troponini)@  CrCl cannot be calculated (Patient's most recent lab result is older than the maximum 7 days allowed.).  No results found in the last 24 hours.  No results found in the last 24 hours.  No results found in the last 24 hours.    Assessment:      1. New onset a-fib    2. Chronic diastolic congestive heart failure    3. Essential hypertension    4. Other hyperlipidemia    5. Thrombus of left atrial appendage    6. Obesity (BMI 30.0-34.9)      Afib with RVR, + SELVIN thrombus  BP and HR controlled and symptoms improved    Plan:   Continue Bumex, Eliquis and Metoprolol  DASH  Recommend heart-healthy diet, weight control and regular exercise.  Shannen. Risk modification.   Repeat STACEY and DCCV on 12/06/2019      I have reviewed all pertinent labs and cardiac studies. Plans and recommendations have been formulated under my direct supervision. All questions answered and patient  voiced understanding. Patient to continue current medications.

## 2019-11-11 NOTE — PROGRESS NOTES
Subjective:   Patient ID:  Shirley Metz is a 86 y.o. female who presents for follow up of Follow-up (4 wk.)      87 yo female. came in for afib. F/u at cardiology service  [facility] Persistent afib, + positive SELVIN, HTN, HLD chfpEF and CKD   echo showed EF 60%. LVH   Nulcear stress test no ischemia    10/07 saw pt in the office for C/o recent weakness,  stomach upset,. Has fast pulse. Some dizziness, and Monet. ekg showed new onset afib with RVR. Increased Mwetoprolol to 100 mg bid and add cardizem and eliquis 2.5 m bid.  10/09, admitted to [facility] for chest pain. Troponin 0.035 chronic elevation. Afib with RVR  . Echo showed normal EF and mild MR> Next day, STACEY showed + SELVIN thrombus and DCCV cancelled.     States that not taking cardizem for 4 weeks.  Log checked, BP and pulse controlled  Taking Bumex, eliquis 2.5 mg bid and Metoprolol. Could not tolerate Labetalol   Improved fatigue, leg swelling, imbalanced,   Chronic MONET  And sleeps with two pillows.   No chest pain and faint.          Past Medical History:   Diagnosis Date    Anemia 11/29/2018    Anxiety 11/28/2017    Arthritis     Atrial fibrillation with RVR 10/9/2019    Back pain     Basal cell carcinoma 03/29/2018    left mid back    Chronic diastolic congestive heart failure 10/15/2019    Colon polyps     reported per patient.     Fuchs' corneal dystrophy     General anesthetics causing adverse effect in therapeutic use     woke up during surgery and gagged on ET tube    Glaucoma     Glaucoma     Heart failure with preserved left ventricular function (HFpEF) 7/24/2018    Hyperlipidemia     Hypertension     Macular degeneration dry    Obesity     Squamous cell carcinoma 01/08/2019    left shoulder    Trouble in sleeping     Urinary tract infection        Past Surgical History:   Procedure Laterality Date    ADENOIDECTOMY  1938    BREAST BIOPSY Left     1997. benign    BREAST CYST EXCISION Left 1997 approx     CARPAL TUNNEL RELEASE Right 2012 approx    CATARACT EXTRACTION Bilateral 1994    CHOLECYSTECTOMY  1975    CORNEAL TRANSPLANT Bilateral 08/2015 8/2015 - left; Right 4/2016    EYE SURGERY      Fuch's Corneal dystrophy - had 2nd operation with Dr. Quintanilla    EYE SURGERY      Cataract wIOL    left thumb Left 2011    removed cuboid for arthritis    REVERSE TOTAL SHOULDER ARTHROPLASTY Left 8/21/2018    Procedure: ARTHROPLASTY, SHOULDER, TOTAL, REVERSE;  Surgeon: Solomon Lujan MD;  Location: [facility] OR;  Service: Orthopedics;  Laterality: Left;  WITH BICEP TENODESIS    SKIN CANCER EXCISION Left 2017    BCC removal by Dr. Valadez    SLT- OS (aka TRABECULOPLASTY) Left 05/11/2011    repeat 3/14/12    TENOTOMY Left 8/21/2018    Procedure: TENOTOMY;  Surgeon: Solomon Lujan MD;  Location: [facility] OR;  Service: Orthopedics;  Laterality: Left;    TONSILLECTOMY  1938    TREATMENT OF CARDIAC ARRHYTHMIA N/A 10/10/2019    Procedure: CARDIOVERSION;  Surgeon: Pete Durán MD;  Location: [facility] CATH LAB;  Service: Cardiology;  Laterality: N/A;       Social History     Tobacco Use    Smoking status: Never Smoker    Smokeless tobacco: Never Used    Tobacco comment: history of passive smoking from her ex-   Substance Use Topics    Alcohol use: Not Currently     Alcohol/week: 2.0 standard drinks     Types: 2 Standard drinks or equivalent per week     Frequency: Never     Comment: occasional     Drug use: No       Family History   Problem Relation Age of Onset    Heart disease Mother     Hypertension Mother     Cancer Father 88        sarcoma( ?Kaposi's ) on leg    Deep vein thrombosis Neg Hx     Ovarian cancer Neg Hx     Breast cancer Neg Hx     Kidney disease Neg Hx     Strabismus Neg Hx     Retinal detachment Neg Hx     Macular degeneration Neg Hx     Glaucoma Neg Hx     Blindness Neg Hx     Amblyopia Neg Hx     Eczema Neg Hx     Lupus Neg Hx     Melanoma Neg Hx     Psoriasis Neg Hx      Diabetes Neg Hx     Stroke Neg Hx     Mental retardation Neg Hx     Mental illness Neg Hx     Hyperlipidemia Neg Hx     COPD Neg Hx     Asthma Neg Hx     Depression Neg Hx     Alcohol abuse Neg Hx     Drug abuse Neg Hx          Review of Systems   Constitution: Positive for malaise/fatigue. Negative for decreased appetite, diaphoresis, fever and night sweats.   HENT: Negative for nosebleeds.    Eyes: Negative for blurred vision and double vision.   Cardiovascular: Positive for dyspnea on exertion and palpitations. Negative for chest pain, claudication, irregular heartbeat, leg swelling, near-syncope, orthopnea, paroxysmal nocturnal dyspnea and syncope.   Respiratory: Negative for cough, shortness of breath, sleep disturbances due to breathing, snoring, sputum production and wheezing.    Endocrine: Negative for cold intolerance and polyuria.   Hematologic/Lymphatic: Does not bruise/bleed easily.   Skin: Negative for rash.   Musculoskeletal: Negative for back pain, falls, joint pain, joint swelling and neck pain.   Gastrointestinal: Positive for abdominal pain. Negative for heartburn, nausea and vomiting.   Genitourinary: Negative for dysuria, frequency and hematuria.   Neurological: Negative for difficulty with concentration, dizziness, focal weakness, headaches, light-headedness, numbness, seizures and weakness.   Psychiatric/Behavioral: Negative for depression, memory loss and substance abuse. The patient does not have insomnia.    Allergic/Immunologic: Negative for HIV exposure and hives.       Objective:   Physical Exam   Constitutional: She is oriented to person, place, and time. She appears well-nourished.   HENT:   Head: Normocephalic.   Eyes: Pupils are equal, round, and reactive to light.   Neck: Normal carotid pulses and no JVD present. Carotid bruit is not present. No thyromegaly present.   Cardiovascular: Normal heart sounds and normal pulses. An irregularly irregular rhythm present.  No  extrasystoles are present. Tachycardia present. PMI is not displaced. Exam reveals no gallop and no S3.   No murmur heard.  Pulmonary/Chest: Breath sounds normal. No stridor. No respiratory distress.   Abdominal: Soft. Bowel sounds are normal. There is no tenderness. There is no rebound.   Musculoskeletal: Normal range of motion.   Neurological: She is alert and oriented to person, place, and time.   Skin: Skin is intact. No rash noted.   Psychiatric: Her behavior is normal.       Lab Results   Component Value Date    CHOL 233 (H) 08/01/2019    CHOL 180 03/16/2018    CHOL 181 02/19/2018     Lab Results   Component Value Date    HDL 49 08/01/2019    HDL 43 03/16/2018    HDL 45 02/19/2018     Lab Results   Component Value Date    LDLCALC 155.0 08/01/2019    LDLCALC 117.6 03/16/2018    LDLCALC 118.2 02/19/2018     Lab Results   Component Value Date    TRIG 145 08/01/2019    TRIG 97 03/16/2018    TRIG 89 02/19/2018     Lab Results   Component Value Date    CHOLHDL 21.0 08/01/2019    CHOLHDL 23.9 03/16/2018    CHOLHDL 24.9 02/19/2018       Chemistry        Component Value Date/Time     10/23/2019 0859    K 4.0 10/23/2019 0859     10/23/2019 0859    CO2 27 10/23/2019 0859    BUN 31 (H) 10/23/2019 0859    CREATININE 1.4 10/23/2019 0859     (H) 10/23/2019 0859        Component Value Date/Time    CALCIUM 9.4 10/23/2019 0859    ALKPHOS 76 10/09/2019 0203    AST 42 (H) 10/09/2019 0203    ALT 48 (H) 10/09/2019 0203    BILITOT 0.5 10/09/2019 0203    ESTGFRAFRICA 39.2 (A) 10/23/2019 0859    EGFRNONAA 34.0 (A) 10/23/2019 0859          Lab Results   Component Value Date    HGBA1C 6.0 (H) 08/01/2019     Lab Results   Component Value Date    TSH 0.849 10/09/2019     Lab Results   Component Value Date    INR 1.0 01/25/2018    INR 1.0 01/01/2017    INR 1.0 02/09/2016     Lab Results   Component Value Date    WBC 8.57 10/11/2019    HGB 10.4 (L) 10/11/2019    HCT 32.6 (L) 10/11/2019    MCV 99 (H) 10/11/2019      10/11/2019     BMP  Sodium   Date Value Ref Range Status   10/23/2019 143 136 - 145 mmol/L Final     Potassium   Date Value Ref Range Status   10/23/2019 4.0 3.5 - 5.1 mmol/L Final     Chloride   Date Value Ref Range Status   10/23/2019 106 95 - 110 mmol/L Final     CO2   Date Value Ref Range Status   10/23/2019 27 23 - 29 mmol/L Final     BUN, Bld   Date Value Ref Range Status   10/23/2019 31 (H) 8 - 23 mg/dL Final     Creatinine   Date Value Ref Range Status   10/23/2019 1.4 0.5 - 1.4 mg/dL Final     Calcium   Date Value Ref Range Status   10/23/2019 9.4 8.7 - 10.5 mg/dL Final     Anion Gap   Date Value Ref Range Status   10/23/2019 10 8 - 16 mmol/L Final     eGFR if    Date Value Ref Range Status   10/23/2019 39.2 (A) >60 mL/min/1.73 m^2 Final     eGFR if non    Date Value Ref Range Status   10/23/2019 34.0 (A) >60 mL/min/1.73 m^2 Final     Comment:     Calculation used to obtain the estimated glomerular filtration  rate (eGFR) is the CKD-EPI equation.        BNP  @LABRCNTIP(BNP,BNPTRIAGEBLO)@  @LABRCNTIP(troponini)@  CrCl cannot be calculated (Patient's most recent lab result is older than the maximum 7 days allowed.).  No results found in the last 24 hours.  No results found in the last 24 hours.  No results found in the last 24 hours.    Assessment:      1. New onset a-fib    2. Chronic diastolic congestive heart failure    3. Essential hypertension    4. Other hyperlipidemia    5. Thrombus of left atrial appendage    6. Obesity (BMI 30.0-34.9)      Afib with RVR, + SELVIN thrombus  BP and HR controlled and symptoms improved    Plan:   Continue Bumex, Eliquis and Metoprolol  DASH  Recommend heart-healthy diet, weight control and regular exercise.  Shannen. Risk modification.   Repeat STACEY and DCCV on 12/06/2019      I have reviewed all pertinent labs and cardiac studies. Plans and recommendations have been formulated under my direct supervision. All questions answered and patient  voiced understanding. Patient to continue current medications.

## 2019-11-11 NOTE — TELEPHONE ENCOUNTER
Scheduled 12/6/19 for 10am  Will call and review instructions    ----- Message from Samy Castillo MD sent at 11/11/2019  1:52 PM CST -----  STACEY and DCCV on 12/06/2019  THX

## 2019-11-11 NOTE — PROGRESS NOTES
Transitional Care Note  Subjective:       Patient ID: Shirley Metz is a 86 y.o. female.  Chief Complaint: follow up from hospital    Family and/or Caretaker present at visit?  No.  Diagnostic tests reviewed/disposition: I have reviewed all completed as well as pending diagnostic tests at the time of discharge.  Disease/illness education: reviewed need for K+ foods  Home health/community services discussion/referrals: Patient does not have home health established from hospital visit.  They do not need home health.  If needed, we will set up home health for the patient.   Establishment or re-establishment of referral orders for community resources: No other necessary community resources.   Discussion with other health care providers: No discussion with other health care providers necessary.   HPI  Review of Systems    Objective:      Physical Exam    Assessment:       1. Atrial fibrillation, unspecified type    2. Thrombus of left atrial appendage    3. Bilateral calf pain        Plan:       ***

## 2019-11-19 ENCOUNTER — PATIENT MESSAGE (OUTPATIENT)
Dept: CARDIOLOGY | Facility: HOSPITAL | Age: 84
End: 2019-11-19

## 2019-11-21 NOTE — TELEPHONE ENCOUNTER
Telephoned patient with instructions and confirm Master/Cv on 12/6/19 for 10am  Arrival time 830, Npo status confirmed

## 2019-11-26 ENCOUNTER — PES CALL (OUTPATIENT)
Dept: ADMINISTRATIVE | Facility: CLINIC | Age: 84
End: 2019-11-26

## 2019-12-06 ENCOUNTER — ANESTHESIA EVENT (OUTPATIENT)
Dept: CARDIOLOGY | Facility: HOSPITAL | Age: 84
End: 2019-12-06
Payer: MEDICARE

## 2019-12-06 ENCOUNTER — ANESTHESIA (OUTPATIENT)
Dept: CARDIOLOGY | Facility: HOSPITAL | Age: 84
End: 2019-12-06
Payer: MEDICARE

## 2019-12-06 ENCOUNTER — HOSPITAL ENCOUNTER (OUTPATIENT)
Facility: HOSPITAL | Age: 84
Discharge: HOME | End: 2019-12-06
Attending: INTERNAL MEDICINE | Admitting: INTERNAL MEDICINE
Payer: MEDICARE

## 2019-12-06 VITALS
SYSTOLIC BLOOD PRESSURE: 132 MMHG | HEART RATE: 109 BPM | HEIGHT: 64 IN | WEIGHT: 173 LBS | BODY MASS INDEX: 29.53 KG/M2 | RESPIRATION RATE: 18 BRPM | DIASTOLIC BLOOD PRESSURE: 73 MMHG | TEMPERATURE: 98 F | OXYGEN SATURATION: 99 %

## 2019-12-06 DIAGNOSIS — I48.91 A-FIB: ICD-10-CM

## 2019-12-06 DIAGNOSIS — I51.3 INTRACARDIAC THROMBOSIS, NOT ELSEWHERE CLASSIFIED: ICD-10-CM

## 2019-12-06 PROCEDURE — 63600175 PHARM REV CODE 636 W HCPCS: Mod: HCNC | Performed by: NURSE ANESTHETIST, CERTIFIED REGISTERED

## 2019-12-06 PROCEDURE — 93325 TRANSESOPHAGEAL ECHO: ICD-10-PCS | Mod: 26,HCNC,, | Performed by: INTERNAL MEDICINE

## 2019-12-06 PROCEDURE — 93005 ELECTROCARDIOGRAM TRACING: CPT | Mod: HCNC,59

## 2019-12-06 PROCEDURE — 92960 CARDIOVERSION (DCCV): ICD-10-PCS | Mod: HCNC,,, | Performed by: INTERNAL MEDICINE

## 2019-12-06 PROCEDURE — 93312 ECHO TRANSESOPHAGEAL: CPT | Mod: HCNC | Performed by: INTERNAL MEDICINE

## 2019-12-06 PROCEDURE — 93312 TRANSESOPHAGEAL ECHO: ICD-10-PCS | Mod: 26,HCNC,, | Performed by: INTERNAL MEDICINE

## 2019-12-06 PROCEDURE — 25000003 PHARM REV CODE 250: Mod: HCNC

## 2019-12-06 PROCEDURE — 92960 CARDIOVERSION ELECTRIC EXT: CPT | Mod: HCNC

## 2019-12-06 PROCEDURE — 93010 ELECTROCARDIOGRAM REPORT: CPT | Mod: HCNC,,, | Performed by: INTERNAL MEDICINE

## 2019-12-06 PROCEDURE — 37000008 HC ANESTHESIA 1ST 15 MINUTES: Mod: HCNC | Performed by: INTERNAL MEDICINE

## 2019-12-06 PROCEDURE — 92960 CARDIOVERSION ELECTRIC EXT: CPT | Mod: HCNC,,, | Performed by: INTERNAL MEDICINE

## 2019-12-06 PROCEDURE — 93325 DOPPLER ECHO COLOR FLOW MAPG: CPT | Mod: HCNC | Performed by: INTERNAL MEDICINE

## 2019-12-06 PROCEDURE — 93325 DOPPLER ECHO COLOR FLOW MAPG: CPT | Mod: 26,HCNC,, | Performed by: INTERNAL MEDICINE

## 2019-12-06 PROCEDURE — 93312 ECHO TRANSESOPHAGEAL: CPT | Mod: 26,HCNC,, | Performed by: INTERNAL MEDICINE

## 2019-12-06 PROCEDURE — 63600175 PHARM REV CODE 636 W HCPCS: Mod: HCNC

## 2019-12-06 PROCEDURE — 93010 EKG 12-LEAD: ICD-10-PCS | Mod: HCNC,,, | Performed by: INTERNAL MEDICINE

## 2019-12-06 RX ORDER — SODIUM CHLORIDE 0.9 % (FLUSH) 0.9 %
10 SYRINGE (ML) INJECTION
Status: DISCONTINUED | OUTPATIENT
Start: 2019-12-06 | End: 2019-12-06 | Stop reason: HOSPADM

## 2019-12-06 RX ORDER — SODIUM CHLORIDE 9 MG/ML
INJECTION, SOLUTION INTRAVENOUS CONTINUOUS PRN
Status: DISCONTINUED | OUTPATIENT
Start: 2019-12-06 | End: 2019-12-06

## 2019-12-06 RX ORDER — PROPOFOL 10 MG/ML
VIAL (ML) INTRAVENOUS
Status: DISCONTINUED | OUTPATIENT
Start: 2019-12-06 | End: 2019-12-06

## 2019-12-06 RX ADMIN — PROPOFOL 80 MG: 10 INJECTION, EMULSION INTRAVENOUS at 11:12

## 2019-12-06 RX ADMIN — SODIUM CHLORIDE: 9 INJECTION, SOLUTION INTRAVENOUS at 10:12

## 2019-12-06 NOTE — ANESTHESIA RELEASE NOTE
"Anesthesia Release from PACU Note    Patient: Shirley Metz    Procedure(s) Performed: Procedure(s) (LRB):  ECHOCARDIOGRAM,TRANSESOPHAGEAL (N/A)    Anesthesia type: MAC    Post pain: Adequate analgesia    Post assessment: no apparent anesthetic complications    Last Vitals:   Visit Vitals  /73   Pulse 109   Temp 36.7 °C (98.1 °F) (Oral)   Resp 18   Ht 5' 4" (1.626 m)   Wt 78.5 kg (173 lb)   SpO2 99%   Breastfeeding? No   BMI 29.70 kg/m²       Post vital signs: stable    Level of consciousness: responds to stimulation    Nausea/Vomiting: no nausea/no vomiting    Complications: none    Airway Patency: patent    Respiratory: unassisted, nasal cannula    Cardiovascular: stable and blood pressure at baseline    Hydration: euvolemic  "

## 2019-12-06 NOTE — PLAN OF CARE
1205 AWAKE AND ORIENTED.  DISCHARGE INST. REVIEWED AND COPY GIVEN.  1210 IV REMOVED. 1215 DISCHARGED HOME.  TO EXIT VIA W/C

## 2019-12-06 NOTE — DISCHARGE INSTRUCTIONS
ACTIVITY/SAFETY  Because of the aftereffect of the sedation you received, we advise you to refrain from the following activities for 24 hours.  1. Do not drive or operate machinery.  2. Do not operate appliances if alone. (stove, iron, lawnmower)  3. Do not sign legal papers or make important decisions.    Have standby assistance on stairways.  It is advised that you have someone stay with you for at least 8 hours after procedure    Comfort:  If you develop a sore throat gargle with warm salt water (1/2 tsp.of salt in 8oz of warm water) or use throat lozenges.     Some of the sedatives you received today may make you nauseated.  Begin with clear liquids and then progress to solid foods as tolerated.    Avoid eating for 1 hour after procedure due to numbing of throat before procedure.    CALL THE DOCTOR FOR:  SEVERE THROAT PAIN / DIFFICULTY SWALLOWING                                                  SEVERE ABDOMINAL OR CHEST PAIN                                                  VOMITING BLOOD.                 ACTIVITY LEVEL  · You may feel sleepy for several hours.  Rest until you are more awake.  Gradually resume your normal activities over the next 24 hours.   · For the next 8 hours, you should be watched by a responsible adult. This person should make sure your condition is not getting worse.   · Don't drink any alcohol for the next 24 hours.  · Don't drive, operate dangerous machinery, or make important business or personal decisions during the next 24 hours.  · Your healthcare provider may tell you not to take any medicine by mouth for pain or sleep in the next 4 hours. These medicines may react with the medicines you were given in the hospital. This could cause a much stronger response than usual.     DIET  At home, begin with liquids and then progress to normal foods if you don't experience nausea.    MEDICATIONS  Continue home medications as before procedure.  You may take Tylenol every four hours as needed  for minor discomfort at pad sites or chest soreness.  If you take Coumadin, resume your regimen and continue to have your blood tested weekly as directed by your physician.    FOLLOW UP  You will be scheduled for a follow up appointment and EKG within two weeks of your procedure.              CALL YOUR HEALTHCARE PROVIDER IF YOU START TO HAVE THE FOLLOWING SYMPTOMS:        1.  Drowsiness that doesn't get better       2. Weakness or dizziness that doesn't get better            3. Repeated vomiting

## 2019-12-06 NOTE — ANESTHESIA PREPROCEDURE EVALUATION
12/06/2019  Shirley Metz is a 87 y.o., female.    Anesthesia Evaluation    I have reviewed the Patient Summary Reports.    I have reviewed the Nursing Notes.   I have reviewed the Medications.     Review of Systems  Anesthesia Hx:  No problems with previous Anesthesia  Neg history of prior surgery. Denies Family Hx of Anesthesia complications.   Denies Personal Hx of Anesthesia complications.   Social:  Non-Smoker, No Alcohol Use    Hematology/Oncology:  Hematology Normal   Oncology Normal     EENT/Dental:EENT/Dental Normal   Cardiovascular:   Exercise tolerance: good Hypertension Dysrhythmias atrial fibrillation CHF hyperlipidemia RUIZ NYHA Classification I ECG has been reviewed. Echo 10/2019:    CONCLUSIONS     1 - Left atrial appendage thrombus.      2 - Normal left ventricular systolic function (EF 60-65%).     3 - Mild to moderate mitral regurgitation.    Pulmonary:  Pulmonary Normal    Renal/:  Renal/ Normal     Hepatic/GI:   GERD    Musculoskeletal:   Arthritis   Spine Disorders: Chronic Pain    Endocrine:  Endocrine Normal    Dermatological:  Skin Normal    Psych:  Psychiatric Normal           Physical Exam  General:  Obesity    Airway/Jaw/Neck:  Airway Findings: Mouth Opening: Normal Tongue: Normal  General Airway Assessment: Adult  Mallampati: I  Improves to II with phonation.  TM Distance: Normal, at least 6 cm       Chest/Lungs:  Chest/Lungs Findings: Normal Respiratory Rate     Heart/Vascular:  Heart Findings: Rate: Tachycardia  Rhythm: Irregularly Irregular  Sounds: Normal             Anesthesia Plan  Type of Anesthesia, risks & benefits discussed:  Anesthesia Type:  MAC  Patient's Preference:   Intra-op Monitoring Plan: standard ASA monitors  Intra-op Monitoring Plan Comments:   Post Op Pain Control Plan: per primary service following discharge from PACU  Post Op Pain Control  Plan Comments:   Induction:   IV  Beta Blocker:  Patient is on a Beta-Blocker and has received one dose within the past 24 hours (No further documentation required).       Informed Consent: Patient understands risks and agrees with Anesthesia plan.  Questions answered. Anesthesia consent signed with patient.  ASA Score: 4     Day of Surgery Review of History & Physical: I have interviewed and examined the patient. I have reviewed the patient's H&P dated:  There are no significant changes.  H&P update referred to the surgeon.         Ready For Surgery From Anesthesia Perspective.

## 2019-12-06 NOTE — ANESTHESIA POSTPROCEDURE EVALUATION
Anesthesia Post Evaluation    Patient: Shirley Metz    Procedure(s) Performed: Procedure(s) (LRB):  ECHOCARDIOGRAM,TRANSESOPHAGEAL (N/A)    Final Anesthesia Type: MAC    Patient location: [facility].  Patient participation: Yes- Able to Participate  Level of consciousness: responds to stimulation  Post-procedure vital signs: reviewed and stable  Pain management: adequate  Airway patency: patent    PONV status at discharge: No PONV  Anesthetic complications: no      Cardiovascular status: blood pressure returned to baseline  Respiratory status: unassisted, room air and nasal cannula  Hydration status: euvolemic  Follow-up not needed.          Vitals Value Taken Time   /73 12/6/2019 10:14 AM   Temp 36.7 °C (98.1 °F) 12/6/2019  9:57 AM   Pulse 109 12/6/2019  9:57 AM   Resp 18 12/6/2019  9:57 AM   SpO2 99 % 12/6/2019  9:57 AM         No case tracking events are documented in the log.      Pain/Beata Score: No data recorded

## 2019-12-06 NOTE — TRANSFER OF CARE
"Anesthesia Transfer of Care Note    Patient: Shirley Metz    Procedure(s) Performed: Procedure(s) (LRB):  ECHOCARDIOGRAM,TRANSESOPHAGEAL (N/A)    Patient location: [facility].    Anesthesia Type: MAC    Transport from OR: Transported from OR on room air with adequate spontaneous ventilation    Post pain: adequate analgesia    Post assessment: no apparent anesthetic complications    Post vital signs: stable    Level of consciousness: responds to stimulation    Nausea/Vomiting: no nausea/vomiting    Complications: none    Transfer of care protocol was followed      Last vitals:   Visit Vitals  /73   Pulse 109   Temp 36.7 °C (98.1 °F) (Oral)   Resp 18   Ht 5' 4" (1.626 m)   Wt 78.5 kg (173 lb)   SpO2 99%   Breastfeeding? No   BMI 29.70 kg/m²     "

## 2019-12-06 NOTE — BRIEF OP NOTE
[facility]  Discharge and Brief Operative Note     SUMMARY     Surgery Date: 12/6/2019     Surgeon(s) and Role:     * Samy Castillo MD - Primary    Assisting Surgeon: None    Pre-op Diagnosis:  Atrial fibrillation, new onset [I48.91]  Thrombus of left atrial appendage [I51.3]    Post-op Diagnosis:  Post-Op Diagnosis Codes:     * Atrial fibrillation, new onset [I48.91]     * Thrombus of left atrial appendage [I51.3]    Procedure(s) (LRB):  ECHOCARDIOGRAM,TRANSESOPHAGEAL (N/A)  DCCV  Anesthesia: Monitor Anesthesia Care      Procedure Description: The risks, benefits, and alternatives of the procedure expalined in detail with patient and family. The patient voices understanding and all questions have been addressed. The patient agrees to proceed as planned.   The patient was sedated by anesthesiology service. The probe was advanced via throat into esophagus smoothly. The patient tolerated the procedure well and there was no complications. The probed was withdrawal at the end of study. The patient was hemodynamically stable.   Estimated Blood Loss: none.   Findings / Operative Note:   No SELVIN thrombus  After STACEY, DCCv x1 and afib was converted to sinus  Continue Eliquis, metoprolol    Ok to discharge to home once hemodynamically stable.  F/U with me in two weeks at cardiology clinic.  DIET DASH  ACTIVITY as toleratde  Specimens:   Specimen (12h ago, onward)    None          Discharge Note    SUMMARY     Admit Date: 12/6/2019    Discharge Date and Time:  12/06/2019 11:17 AM    Final Diagnosis: Post-Op Diagnosis Codes:     * Atrial fibrillation, new onset [I48.91]     * Thrombus of left atrial appendage [I51.3]    Disposition: Home or Self Care    Follow Up/Patient Instructions:     Medications:  Reconciled Home Medications:      Medication List      CONTINUE taking these medications    acetaminophen 500 MG tablet  Commonly known as:  TYLENOL  Take 500 mg by mouth daily as needed. Taking 1 to 3     ALPRAZolam  0.5 MG tablet  Commonly known as:  XANAX  TAKE 1/2 - 1 TABLET BY MOUTH NIGHTLY FOR SLEEP OR ANXIETY     apixaban 2.5 mg Tab  Commonly known as:  ELIQUIS  Take 1 tablet (2.5 mg total) by mouth 2 (two) times daily.     bumetanide 1 MG tablet  Commonly known as:  BUMEX  Take 1 tablet (1 mg total) by mouth once daily.     coenzyme Q10 200 mg capsule  Take 400 mg by mouth once daily.     fish oil-omega-3 fatty acids 300-1,000 mg capsule  Take 2 g by mouth 2 (two) times daily.     Lotemax 0.5 % Drpg  Generic drug:  loteprednol etabonate     metoprolol tartrate 50 MG tablet  Commonly known as:  LOPRESSOR  Take 2 tablets (100 mg total) by mouth 2 (two) times daily.     MIRALAX ORAL  Take by mouth as needed.     ondansetron 8 MG Tbdl  Commonly known as:  ZOFRAN-ODT  Take 1 tablet (8 mg total) by mouth every 8 (eight) hours as needed.     Rhopressa 0.02 % Drop  Generic drug:  netarsudil  once daily.     Travatan Z 0.004 % ophthalmic solution  Generic drug:  travoprost  PLACE 1 DROP INTO THE LEFT EYE EVERY EVENING.     Vitamin D3 50 mcg (2,000 unit) Cap  Generic drug:  cholecalciferol (vitamin D3)  Take 1 capsule by mouth once daily.          No discharge procedures on file.

## 2019-12-09 ENCOUNTER — OFFICE VISIT (OUTPATIENT)
Dept: DERMATOLOGY | Facility: CLINIC | Age: 84
End: 2019-12-09
Payer: MEDICARE

## 2019-12-09 DIAGNOSIS — L82.0 SEBORRHEIC KERATOSES, INFLAMED: ICD-10-CM

## 2019-12-09 DIAGNOSIS — Z12.83 SCREENING EXAM FOR SKIN CANCER: ICD-10-CM

## 2019-12-09 DIAGNOSIS — L82.1 SEBORRHEIC KERATOSIS: ICD-10-CM

## 2019-12-09 DIAGNOSIS — Z85.828 HISTORY OF SKIN CANCER: Primary | ICD-10-CM

## 2019-12-09 DIAGNOSIS — D18.00 HEMANGIOMA, UNSPECIFIED SITE: ICD-10-CM

## 2019-12-09 PROCEDURE — 99214 OFFICE O/P EST MOD 30 MIN: CPT | Mod: 25,HCNC,S$GLB, | Performed by: DERMATOLOGY

## 2019-12-09 PROCEDURE — 17110 DESTRUCTION B9 LES UP TO 14: CPT | Mod: HCNC,S$GLB,, | Performed by: DERMATOLOGY

## 2019-12-09 PROCEDURE — 99999 PR PBB SHADOW E&M-EST. PATIENT-LVL II: CPT | Mod: PBBFAC,HCNC,, | Performed by: DERMATOLOGY

## 2019-12-09 PROCEDURE — 99999 PR PBB SHADOW E&M-EST. PATIENT-LVL II: ICD-10-PCS | Mod: PBBFAC,HCNC,, | Performed by: DERMATOLOGY

## 2019-12-09 PROCEDURE — 1126F AMNT PAIN NOTED NONE PRSNT: CPT | Mod: HCNC,S$GLB,, | Performed by: DERMATOLOGY

## 2019-12-09 PROCEDURE — 1101F PT FALLS ASSESS-DOCD LE1/YR: CPT | Mod: HCNC,CPTII,S$GLB, | Performed by: DERMATOLOGY

## 2019-12-09 PROCEDURE — 1101F PR PT FALLS ASSESS DOC 0-1 FALLS W/OUT INJ PAST YR: ICD-10-PCS | Mod: HCNC,CPTII,S$GLB, | Performed by: DERMATOLOGY

## 2019-12-09 PROCEDURE — 1126F PR PAIN SEVERITY QUANTIFIED, NO PAIN PRESENT: ICD-10-PCS | Mod: HCNC,S$GLB,, | Performed by: DERMATOLOGY

## 2019-12-09 PROCEDURE — 17110 PR DESTRUCTION BENIGN LESIONS UP TO 14: ICD-10-PCS | Mod: HCNC,S$GLB,, | Performed by: DERMATOLOGY

## 2019-12-09 PROCEDURE — 1159F PR MEDICATION LIST DOCUMENTED IN MEDICAL RECORD: ICD-10-PCS | Mod: HCNC,S$GLB,, | Performed by: DERMATOLOGY

## 2019-12-09 PROCEDURE — 99214 PR OFFICE/OUTPT VISIT, EST, LEVL IV, 30-39 MIN: ICD-10-PCS | Mod: 25,HCNC,S$GLB, | Performed by: DERMATOLOGY

## 2019-12-09 PROCEDURE — 1159F MED LIST DOCD IN RCRD: CPT | Mod: HCNC,S$GLB,, | Performed by: DERMATOLOGY

## 2019-12-09 NOTE — PATIENT INSTRUCTIONS

## 2019-12-09 NOTE — PROGRESS NOTES
Subjective:       Patient ID:  Shirley Metz is a 87 y.o. female who presents for   Chief Complaint   Patient presents with    Mole     mole check     Skin Check     all over      History of Present Illness: The patient presents for follow up of skin check.    Last seen March 2019 by Dr. Valadez. Hx of SCC left posterior shoulder and BCC of the left mid-back (s/p ED&C on 5/21/18, s/p re-biopsy of ED&C site on 1/22/19 showing keloid scar no evidence of recurrence).    This is a high risk patient here to check for the development of new lesions. Denies bleeding lesions.     Other skin complaints: see below    Patient complains of lesion(s)  Location: lip  Duration: several months to a year  Symptoms: none  Relieving factors/Previous treatments: none    Patient complains of lesion(s)  Location: L forehead  Duration: 6 months  Symptoms: itches  Relieving factors/Previous treatments: none    Patient complains of lesion(s)  Location: lower forehead  Duration: several months-year  Symptoms: intermittent swelling, enlarging, and itching, described as half inch circles above the eyebrows and lumpy when wrinkling the forehead  Relieving factors/Previous treatments: none              Review of Systems   Skin: Positive for activity-related sunscreen use. Negative for daily sunscreen use and recent sunburn.   Hematologic/Lymphatic: Bruises/bleeds easily (on eliquis).        Objective:    Physical Exam   Constitutional: She appears well-developed and well-nourished. No distress.   Neurological: She is alert and oriented to person, place, and time. She is not disoriented.   Psychiatric: She has a normal mood and affect.   Skin:   Areas Examined (abnormalities noted in diagram):   Scalp / Hair Palpated and Inspected  Head / Face Inspection Performed  Neck Inspection Performed  Chest / Axilla Inspection Performed  Abdomen Inspection Performed  Back Inspection Performed  RUE Inspected  LUE Inspection Performed  RLE  Inspected  LLE Inspection Performed  Nails and Digits Inspection Performed                   Diagram Legend     Erythematous scaling macule/papule c/w actinic keratosis       Vascular papule c/w angioma      Pigmented verrucoid papule/plaque c/w seborrheic keratosis      Yellow umbilicated papule c/w sebaceous hyperplasia      Irregularly shaped tan macule c/w lentigo     1-2 mm smooth white papules consistent with Milia      Movable subcutaneous cyst with punctum c/w epidermal inclusion cyst      Subcutaneous movable cyst c/w pilar cyst      Firm pink to brown papule c/w dermatofibroma      Pedunculated fleshy papule(s) c/w skin tag(s)      Evenly pigmented macule c/w junctional nevus     Mildly variegated pigmented, slightly irregular-bordered macule c/w mildly atypical nevus      Flesh colored to evenly pigmented papule c/w intradermal nevus       Pink pearly papule/plaque c/w basal cell carcinoma      Erythematous hyperkeratotic cursted plaque c/w SCC      Surgical scar with no sign of skin cancer recurrence      Open and closed comedones      Inflammatory papules and pustules      Verrucoid papule consistent consistent with wart     Erythematous eczematous patches and plaques     Dystrophic onycholytic nail with subungual debris c/w onychomycosis     Umbilicated papule    Erythematous-base heme-crusted tan verrucoid plaque consistent with inflamed seborrheic keratosis     Erythematous Silvery Scaling Plaque c/w Psoriasis     See annotation      Assessment / Plan:        History of NMSC  Screening exam for skin cancer  Area of previous skin cancers examined. Site well healed with no signs of recurrence.    Total body skin examination performed today including at least 12 points as noted in physical examination. No lesions suspicious for malignancy noted.      Angiomas (trunk), venous lake (lip)  This is a benign vascular lesion. Reassurance given. No treatment required.     Seborrheic keratosis  This is a common  condition representing benign enlargement of the sebaceous lobule. It typically occurs in adulthood. Reassurance given to patient.     Seborrheic keratoses, inflamed  Cryosurgery procedure note:    Verbal consent from the patient is obtained. Liquid nitrogen cryosurgery is applied to 1 lesions to produce a freeze injury. The patient is aware that blisters may form and is instructed on wound care with gentle cleansing and use of vaseline ointment to keep moist until healed. The patient is supplied a handout on cryosurgery.    Multiple benign nevi  Full body skin examination performed today including at least 12 points as noted in physical examination. No lesions suspicious for malignancy noted.  Reassurance provided.  Instructed patient to observe lesion(s) for changes and follow up in clinic if changes are noted. Discussed ABCDE's of moles and brochure provided.    Offered biopsy of area of lower forehead that is concerning to patient, though likely to be low yield as I could not identify physical exam findings. Patient declined but will continue to monitor.           Follow up in about 6 months (around 6/9/2020) with Dr. Valadez

## 2019-12-10 ENCOUNTER — OFFICE VISIT (OUTPATIENT)
Dept: OPHTHALMOLOGY | Facility: CLINIC | Age: 84
End: 2019-12-10
Payer: MEDICARE

## 2019-12-10 DIAGNOSIS — H40.1134 PRIMARY OPEN ANGLE GLAUCOMA OF BOTH EYES, INDETERMINATE STAGE: Primary | ICD-10-CM

## 2019-12-10 DIAGNOSIS — Z96.1 PSEUDOPHAKIA: ICD-10-CM

## 2019-12-10 PROCEDURE — 92012 INTRM OPH EXAM EST PATIENT: CPT | Mod: HCNC,S$GLB,, | Performed by: OPHTHALMOLOGY

## 2019-12-10 PROCEDURE — 99999 PR PBB SHADOW E&M-EST. PATIENT-LVL II: CPT | Mod: PBBFAC,HCNC,, | Performed by: OPHTHALMOLOGY

## 2019-12-10 PROCEDURE — 92012 PR EYE EXAM, EST PATIENT,INTERMED: ICD-10-PCS | Mod: HCNC,S$GLB,, | Performed by: OPHTHALMOLOGY

## 2019-12-10 PROCEDURE — 99999 PR PBB SHADOW E&M-EST. PATIENT-LVL II: ICD-10-PCS | Mod: PBBFAC,HCNC,, | Performed by: OPHTHALMOLOGY

## 2019-12-10 NOTE — PROGRESS NOTES
SUBJECTIVE:   Shirley Metz is a 87 y.o. female   Corrected distance visual acuity was 20/30 in the right eye and 20/30 in the left eye.   Chief Complaint   Patient presents with    Glaucoma        HPI:  HPI     Pt here for iop check stated doing good with eye drops   OS running water    1. Mild COAG Goal < 19  SLT OD 3/04 (20 to 15) + 3/11 (19 to 15)  SLT OS 5/05 (20 to 14) +5/11 (18-19 to 15) + 3/12 (min resp.) + 3/11/15   (20.5-17.5) + 3/7/18 (24- 21)  (Cosopt, Xalatan, and Timolol cause Myalgias)  (Alphagan causes redness) (zioptan too expensive)  2. PCIOL OU  3. Dry AMD  4. Fuch's Dystrophy   DSAEK OD 4/16 Dr. Quintanilla (followed by Dwight every summer)  DSAEK OS 8/15 Dr. Quintanilla  5. Dry Eye -intolerance to Restasis       Lotemax Mon, Wed, Fri and Sun once daily OU  Systane Ultra 4-5 tiimes daily  Travatan Z qhs os  Rhopressa qhs OS    Last edited by Kiersten Phillips MA on 12/10/2019 10:40 AM. (History)        Assessment /Plan :  1. Primary open angle glaucoma of both eyes, indeterminate stage   Doing well, IOP OD within acceptable range relative to target IOP and no evidence of progression. Continue current treatment. Reviewed importance of continued compliance with treatment and follow up.    IOP OS not within acceptable range relative to target IOP with risk of irreversible visual loss. Better IOP control is recommended. Discussed options, risks, and benefits of additional medication, SLT laser, and/or incisional glaucoma surgery. Reviewed importance of continued compliance with treatment and follow up.     Patient chooses defer and recheck IOP next visit       2. Pseudophakia  -- Condition stable, no therapeutic change required. Monitoring routinely.       Return to clinic in 4 months  or as needed.  With GOCT, Dilation and HVF 24-2

## 2019-12-17 ENCOUNTER — LAB VISIT (OUTPATIENT)
Dept: LAB | Facility: HOSPITAL | Age: 84
End: 2019-12-17
Attending: INTERNAL MEDICINE
Payer: MEDICARE

## 2019-12-17 ENCOUNTER — OFFICE VISIT (OUTPATIENT)
Dept: CARDIOLOGY | Facility: CLINIC | Age: 84
End: 2019-12-17
Payer: MEDICARE

## 2019-12-17 VITALS
BODY MASS INDEX: 29.33 KG/M2 | OXYGEN SATURATION: 97 % | SYSTOLIC BLOOD PRESSURE: 148 MMHG | HEART RATE: 69 BPM | WEIGHT: 170.88 LBS | DIASTOLIC BLOOD PRESSURE: 80 MMHG

## 2019-12-17 DIAGNOSIS — I50.32 CHRONIC DIASTOLIC CONGESTIVE HEART FAILURE: ICD-10-CM

## 2019-12-17 DIAGNOSIS — I48.0 PAROXYSMAL ATRIAL FIBRILLATION: Primary | ICD-10-CM

## 2019-12-17 DIAGNOSIS — I51.3 THROMBUS OF LEFT ATRIAL APPENDAGE: ICD-10-CM

## 2019-12-17 DIAGNOSIS — I10 ESSENTIAL HYPERTENSION: Chronic | ICD-10-CM

## 2019-12-17 DIAGNOSIS — I48.0 PAROXYSMAL ATRIAL FIBRILLATION: ICD-10-CM

## 2019-12-17 LAB
ANION GAP SERPL CALC-SCNC: 8 MMOL/L (ref 8–16)
BUN SERPL-MCNC: 31 MG/DL (ref 8–23)
CALCIUM SERPL-MCNC: 9.9 MG/DL (ref 8.7–10.5)
CHLORIDE SERPL-SCNC: 104 MMOL/L (ref 95–110)
CO2 SERPL-SCNC: 30 MMOL/L (ref 23–29)
CREAT SERPL-MCNC: 1.3 MG/DL (ref 0.5–1.4)
EST. GFR  (AFRICAN AMERICAN): 42.6 ML/MIN/1.73 M^2
EST. GFR  (NON AFRICAN AMERICAN): 37 ML/MIN/1.73 M^2
GLUCOSE SERPL-MCNC: 95 MG/DL (ref 70–110)
POTASSIUM SERPL-SCNC: 4 MMOL/L (ref 3.5–5.1)
SODIUM SERPL-SCNC: 142 MMOL/L (ref 136–145)

## 2019-12-17 PROCEDURE — 99999 PR PBB SHADOW E&M-EST. PATIENT-LVL III: ICD-10-PCS | Mod: PBBFAC,HCNC,, | Performed by: INTERNAL MEDICINE

## 2019-12-17 PROCEDURE — 1101F PR PT FALLS ASSESS DOC 0-1 FALLS W/OUT INJ PAST YR: ICD-10-PCS | Mod: HCNC,CPTII,S$GLB, | Performed by: INTERNAL MEDICINE

## 2019-12-17 PROCEDURE — 1126F PR PAIN SEVERITY QUANTIFIED, NO PAIN PRESENT: ICD-10-PCS | Mod: HCNC,S$GLB,, | Performed by: INTERNAL MEDICINE

## 2019-12-17 PROCEDURE — 1159F MED LIST DOCD IN RCRD: CPT | Mod: HCNC,S$GLB,, | Performed by: INTERNAL MEDICINE

## 2019-12-17 PROCEDURE — 36415 COLL VENOUS BLD VENIPUNCTURE: CPT | Mod: HCNC

## 2019-12-17 PROCEDURE — 99214 PR OFFICE/OUTPT VISIT, EST, LEVL IV, 30-39 MIN: ICD-10-PCS | Mod: HCNC,S$GLB,, | Performed by: INTERNAL MEDICINE

## 2019-12-17 PROCEDURE — 80048 BASIC METABOLIC PNL TOTAL CA: CPT | Mod: HCNC

## 2019-12-17 PROCEDURE — 1159F PR MEDICATION LIST DOCUMENTED IN MEDICAL RECORD: ICD-10-PCS | Mod: HCNC,S$GLB,, | Performed by: INTERNAL MEDICINE

## 2019-12-17 PROCEDURE — 1126F AMNT PAIN NOTED NONE PRSNT: CPT | Mod: HCNC,S$GLB,, | Performed by: INTERNAL MEDICINE

## 2019-12-17 PROCEDURE — 99999 PR PBB SHADOW E&M-EST. PATIENT-LVL III: CPT | Mod: PBBFAC,HCNC,, | Performed by: INTERNAL MEDICINE

## 2019-12-17 PROCEDURE — 99214 OFFICE O/P EST MOD 30 MIN: CPT | Mod: HCNC,S$GLB,, | Performed by: INTERNAL MEDICINE

## 2019-12-17 PROCEDURE — 1101F PT FALLS ASSESS-DOCD LE1/YR: CPT | Mod: HCNC,CPTII,S$GLB, | Performed by: INTERNAL MEDICINE

## 2019-12-17 RX ORDER — METOPROLOL TARTRATE 50 MG/1
75 TABLET ORAL 2 TIMES DAILY
Qty: 60 TABLET | Refills: 11 | Status: SHIPPED | OUTPATIENT
Start: 2019-12-17 | End: 2020-01-20 | Stop reason: ALTCHOICE

## 2019-12-17 RX ORDER — BUMETANIDE 0.5 MG/1
0.5 TABLET ORAL DAILY
Qty: 30 TABLET | Refills: 11 | Status: SHIPPED | OUTPATIENT
Start: 2019-12-17 | End: 2020-01-20 | Stop reason: SDUPTHER

## 2019-12-17 NOTE — PROGRESS NOTES
Subjective:   Patient ID:  Shirley Metz is a 87 y.o. female who presents for follow up of No chief complaint on file.      86 yo female. came in for afib. F/u at cardiology service  PMH pafib, + positive SELVIN, HTN, HLD chfpEF and CKD   echo showed EF 60%. LVH   Nulcear stress test no ischemia    10/07 saw pt in the office for C/o recent weakness,  stomach upset,. Has fast pulse. Some dizziness, and Monet. ekg showed new onset afib with RVR. Increased Mwetoprolol to 100 mg bid and add cardizem and eliquis 2.5 m bid.  10/09, admitted to [facility] for chest pain. Troponin 0.035 chronic elevation. Afib with RVR  . Echo showed normal EF and mild MR> Next day, STACEY showed + SELVIN thrombus and DCCV cancelled. Continued Eliquis  12/07 repeated STACEY no SELVIN thrombus and DCCV x1 converted to sinus  Log checked, BP and pulse controlled  Taking Bumex, eliquis 2.5 mg bid and Metoprolol. Could not tolerate Labetalol   Improved fatigue, leg swelling, imbalanced,   Chronic MONET improved.  And sleeps with two pillows.   C/o urinated too much. Edema better  No chest pain and faint.          Past Medical History:   Diagnosis Date    Anemia 11/29/2018    Anxiety 11/28/2017    Arthritis     Atrial fibrillation with RVR 10/9/2019    Back pain     Basal cell carcinoma 03/29/2018    left mid back    Chronic diastolic congestive heart failure 10/15/2019    Colon polyps     reported per patient.     Fuchs' corneal dystrophy     General anesthetics causing adverse effect in therapeutic use     woke up during surgery and gagged on ET tube    Glaucoma     Glaucoma     Heart failure with preserved left ventricular function (HFpEF) 7/24/2018    Hyperlipidemia     Hypertension     Macular degeneration dry    Obesity     Squamous cell carcinoma 01/08/2019    left shoulder    Trouble in sleeping     Urinary tract infection        Past Surgical History:   Procedure Laterality Date    ADENOIDECTOMY  1938     BREAST BIOPSY Left     1997. benign    BREAST CYST EXCISION Left 1997 approx    CARPAL TUNNEL RELEASE Right 2012 approx    CATARACT EXTRACTION Bilateral 1994    CHOLECYSTECTOMY  1975    CORNEAL TRANSPLANT Bilateral 08/2015 8/2015 - left; Right 4/2016    EYE SURGERY      Fuch's Corneal dystrophy - had 2nd operation with Dr. Quintanilla    EYE SURGERY      Cataract wIOL    left thumb Left 2011    removed cuboid for arthritis    REVERSE TOTAL SHOULDER ARTHROPLASTY Left 8/21/2018    Procedure: ARTHROPLASTY, SHOULDER, TOTAL, REVERSE;  Surgeon: Solomon Lujan MD;  Location: [facility] OR;  Service: Orthopedics;  Laterality: Left;  WITH BICEP TENODESIS    SKIN CANCER EXCISION Left 2017    BCC removal by Dr. Valadez    SLT- OS (aka TRABECULOPLASTY) Left 05/11/2011    repeat 3/14/12    TENOTOMY Left 8/21/2018    Procedure: TENOTOMY;  Surgeon: Solomon Lujan MD;  Location: [facility] OR;  Service: Orthopedics;  Laterality: Left;    TONSILLECTOMY  1938    TREATMENT OF CARDIAC ARRHYTHMIA N/A 10/10/2019    Procedure: CARDIOVERSION;  Surgeon: Pete Durán MD;  Location: [facility] CATH LAB;  Service: Cardiology;  Laterality: N/A;    TREATMENT OF CARDIAC ARRHYTHMIA N/A 12/6/2019    Procedure: CARDIOVERSION;  Surgeon: Samy Castillo MD;  Location: [facility] CATH LAB;  Service: Cardiology;  Laterality: N/A;       Social History     Tobacco Use    Smoking status: Never Smoker    Smokeless tobacco: Never Used    Tobacco comment: history of passive smoking from her ex-   Substance Use Topics    Alcohol use: Not Currently     Alcohol/week: 2.0 standard drinks     Types: 2 Standard drinks or equivalent per week     Frequency: Never     Comment: occasional     Drug use: No       Family History   Problem Relation Age of Onset    Heart disease Mother     Hypertension Mother     Cancer Father 88        sarcoma( ?Kaposi's ) on leg    Deep vein thrombosis Neg Hx     Ovarian cancer Neg Hx     Breast cancer Neg Hx     Kidney  disease Neg Hx     Strabismus Neg Hx     Retinal detachment Neg Hx     Macular degeneration Neg Hx     Glaucoma Neg Hx     Blindness Neg Hx     Amblyopia Neg Hx     Eczema Neg Hx     Lupus Neg Hx     Melanoma Neg Hx     Psoriasis Neg Hx     Diabetes Neg Hx     Stroke Neg Hx     Mental retardation Neg Hx     Mental illness Neg Hx     Hyperlipidemia Neg Hx     COPD Neg Hx     Asthma Neg Hx     Depression Neg Hx     Alcohol abuse Neg Hx     Drug abuse Neg Hx          Review of Systems   Constitution: Positive for malaise/fatigue and weight loss. Negative for decreased appetite, diaphoresis, fever and night sweats.   HENT: Negative for nosebleeds.    Eyes: Negative for blurred vision and double vision.   Cardiovascular: Positive for palpitations. Negative for chest pain, claudication, irregular heartbeat, leg swelling, near-syncope, orthopnea, paroxysmal nocturnal dyspnea and syncope.   Respiratory: Negative for cough, shortness of breath, sleep disturbances due to breathing, snoring, sputum production and wheezing.    Endocrine: Negative for cold intolerance and polyuria.   Hematologic/Lymphatic: Does not bruise/bleed easily.   Skin: Negative for rash.   Musculoskeletal: Negative for back pain, falls, joint pain, joint swelling and neck pain.   Gastrointestinal: Positive for abdominal pain. Negative for heartburn, nausea and vomiting.   Genitourinary: Negative for dysuria, frequency and hematuria.   Neurological: Negative for difficulty with concentration, dizziness, focal weakness, headaches, light-headedness, numbness, seizures and weakness.   Psychiatric/Behavioral: Negative for depression, memory loss and substance abuse. The patient does not have insomnia.    Allergic/Immunologic: Negative for HIV exposure and hives.       Objective:   Physical Exam   Constitutional: She is oriented to person, place, and time. She appears well-nourished.   HENT:   Head: Normocephalic.   Eyes: Pupils are equal,  round, and reactive to light.   Neck: Normal carotid pulses and no JVD present. Carotid bruit is not present. No thyromegaly present.   Cardiovascular: Normal rate, regular rhythm, normal heart sounds and normal pulses.  No extrasystoles are present. PMI is not displaced. Exam reveals no gallop and no S3.   No murmur heard.  Pulmonary/Chest: Breath sounds normal. No stridor. No respiratory distress.   Abdominal: Soft. Bowel sounds are normal. There is no tenderness. There is no rebound.   Musculoskeletal: Normal range of motion.   Neurological: She is alert and oriented to person, place, and time.   Skin: Skin is intact. No rash noted.   Psychiatric: Her behavior is normal.       Lab Results   Component Value Date    CHOL 233 (H) 08/01/2019    CHOL 180 03/16/2018    CHOL 181 02/19/2018     Lab Results   Component Value Date    HDL 49 08/01/2019    HDL 43 03/16/2018    HDL 45 02/19/2018     Lab Results   Component Value Date    LDLCALC 155.0 08/01/2019    LDLCALC 117.6 03/16/2018    LDLCALC 118.2 02/19/2018     Lab Results   Component Value Date    TRIG 145 08/01/2019    TRIG 97 03/16/2018    TRIG 89 02/19/2018     Lab Results   Component Value Date    CHOLHDL 21.0 08/01/2019    CHOLHDL 23.9 03/16/2018    CHOLHDL 24.9 02/19/2018       Chemistry        Component Value Date/Time     10/23/2019 0859    K 4.0 10/23/2019 0859     10/23/2019 0859    CO2 27 10/23/2019 0859    BUN 31 (H) 10/23/2019 0859    CREATININE 1.4 10/23/2019 0859     (H) 10/23/2019 0859        Component Value Date/Time    CALCIUM 9.4 10/23/2019 0859    ALKPHOS 76 10/09/2019 0203    AST 42 (H) 10/09/2019 0203    ALT 48 (H) 10/09/2019 0203    BILITOT 0.5 10/09/2019 0203    ESTGFRAFRICA 39.2 (A) 10/23/2019 0859    EGFRNONAA 34.0 (A) 10/23/2019 0859          Lab Results   Component Value Date    HGBA1C 6.0 (H) 08/01/2019     Lab Results   Component Value Date    TSH 0.849 10/09/2019     Lab Results   Component Value Date    INR 1.0  01/25/2018    INR 1.0 01/01/2017    INR 1.0 02/09/2016     Lab Results   Component Value Date    WBC 8.57 10/11/2019    HGB 10.4 (L) 10/11/2019    HCT 32.6 (L) 10/11/2019    MCV 99 (H) 10/11/2019     10/11/2019     BMP  Sodium   Date Value Ref Range Status   10/23/2019 143 136 - 145 mmol/L Final     Potassium   Date Value Ref Range Status   10/23/2019 4.0 3.5 - 5.1 mmol/L Final     Chloride   Date Value Ref Range Status   10/23/2019 106 95 - 110 mmol/L Final     CO2   Date Value Ref Range Status   10/23/2019 27 23 - 29 mmol/L Final     BUN, Bld   Date Value Ref Range Status   10/23/2019 31 (H) 8 - 23 mg/dL Final     Creatinine   Date Value Ref Range Status   10/23/2019 1.4 0.5 - 1.4 mg/dL Final     Calcium   Date Value Ref Range Status   10/23/2019 9.4 8.7 - 10.5 mg/dL Final     Anion Gap   Date Value Ref Range Status   10/23/2019 10 8 - 16 mmol/L Final     eGFR if    Date Value Ref Range Status   10/23/2019 39.2 (A) >60 mL/min/1.73 m^2 Final     eGFR if non    Date Value Ref Range Status   10/23/2019 34.0 (A) >60 mL/min/1.73 m^2 Final     Comment:     Calculation used to obtain the estimated glomerular filtration  rate (eGFR) is the CKD-EPI equation.        BNP  @LABRCNTIP(BNP,BNPTRIAGEBLO)@  @LABRCNTIP(troponini)@  CrCl cannot be calculated (Patient's most recent lab result is older than the maximum 7 days allowed.).  No results found in the last 24 hours.  No results found in the last 24 hours.  No results found in the last 24 hours.    Assessment:      1. Paroxysmal atrial fibrillation    2. Chronic diastolic congestive heart failure    3. Essential hypertension    4. Thrombus of left atrial appendage      Pt's BP always been controlled at home  PAF on sinus  No active bleeding  CHfpEF compensated FC III  Lives at senior apartment     Plan:   Decrease Bumex to 0.5 mg daily  Continue Metoprolol 75 mg bid and Eliquis 2.5 mg bid advanced age dose  BMP now  DASH  Recommend  heart-healthy diet, weight control and regular exercise.  Shannen. Risk modification.   RTC in 3 months    I have reviewed all pertinent labs and cardiac studies. Plans and recommendations have been formulated under my direct supervision. All questions answered and patient voiced understanding. Patient to continue current medications.

## 2019-12-20 ENCOUNTER — CLINICAL SUPPORT (OUTPATIENT)
Dept: CARDIOLOGY | Facility: CLINIC | Age: 84
End: 2019-12-20
Payer: MEDICARE

## 2019-12-20 ENCOUNTER — OFFICE VISIT (OUTPATIENT)
Dept: INTERNAL MEDICINE | Facility: CLINIC | Age: 84
End: 2019-12-20
Payer: MEDICARE

## 2019-12-20 VITALS
BODY MASS INDEX: 29.24 KG/M2 | SYSTOLIC BLOOD PRESSURE: 150 MMHG | HEART RATE: 60 BPM | WEIGHT: 171.31 LBS | TEMPERATURE: 99 F | DIASTOLIC BLOOD PRESSURE: 76 MMHG | OXYGEN SATURATION: 95 % | HEIGHT: 64 IN

## 2019-12-20 DIAGNOSIS — I10 ESSENTIAL HYPERTENSION: Chronic | ICD-10-CM

## 2019-12-20 DIAGNOSIS — I48.0 PAROXYSMAL ATRIAL FIBRILLATION: Primary | ICD-10-CM

## 2019-12-20 DIAGNOSIS — I48.0 PAROXYSMAL ATRIAL FIBRILLATION: ICD-10-CM

## 2019-12-20 PROBLEM — R74.8 ELEVATED LIVER ENZYMES: Status: ACTIVE | Noted: 2019-12-20

## 2019-12-20 PROCEDURE — 99214 OFFICE O/P EST MOD 30 MIN: CPT | Mod: HCNC,S$GLB,, | Performed by: FAMILY MEDICINE

## 2019-12-20 PROCEDURE — 1101F PT FALLS ASSESS-DOCD LE1/YR: CPT | Mod: HCNC,CPTII,S$GLB, | Performed by: FAMILY MEDICINE

## 2019-12-20 PROCEDURE — 93010 ELECTROCARDIOGRAM REPORT: CPT | Mod: HCNC,S$GLB,, | Performed by: INTERNAL MEDICINE

## 2019-12-20 PROCEDURE — 1125F AMNT PAIN NOTED PAIN PRSNT: CPT | Mod: HCNC,S$GLB,, | Performed by: FAMILY MEDICINE

## 2019-12-20 PROCEDURE — 1159F MED LIST DOCD IN RCRD: CPT | Mod: HCNC,S$GLB,, | Performed by: FAMILY MEDICINE

## 2019-12-20 PROCEDURE — 1101F PR PT FALLS ASSESS DOC 0-1 FALLS W/OUT INJ PAST YR: ICD-10-PCS | Mod: HCNC,CPTII,S$GLB, | Performed by: FAMILY MEDICINE

## 2019-12-20 PROCEDURE — 93005 EKG 12-LEAD: ICD-10-PCS | Mod: HCNC,S$GLB,, | Performed by: FAMILY MEDICINE

## 2019-12-20 PROCEDURE — 1125F PR PAIN SEVERITY QUANTIFIED, PAIN PRESENT: ICD-10-PCS | Mod: HCNC,S$GLB,, | Performed by: FAMILY MEDICINE

## 2019-12-20 PROCEDURE — 93010 EKG 12-LEAD: ICD-10-PCS | Mod: HCNC,S$GLB,, | Performed by: INTERNAL MEDICINE

## 2019-12-20 PROCEDURE — 99999 PR PBB SHADOW E&M-EST. PATIENT-LVL III: ICD-10-PCS | Mod: PBBFAC,HCNC,, | Performed by: FAMILY MEDICINE

## 2019-12-20 PROCEDURE — 93005 ELECTROCARDIOGRAM TRACING: CPT | Mod: HCNC,S$GLB,, | Performed by: FAMILY MEDICINE

## 2019-12-20 PROCEDURE — 1159F PR MEDICATION LIST DOCUMENTED IN MEDICAL RECORD: ICD-10-PCS | Mod: HCNC,S$GLB,, | Performed by: FAMILY MEDICINE

## 2019-12-20 PROCEDURE — 99999 PR PBB SHADOW E&M-EST. PATIENT-LVL III: CPT | Mod: PBBFAC,HCNC,, | Performed by: FAMILY MEDICINE

## 2019-12-20 PROCEDURE — 99214 PR OFFICE/OUTPT VISIT, EST, LEVL IV, 30-39 MIN: ICD-10-PCS | Mod: HCNC,S$GLB,, | Performed by: FAMILY MEDICINE

## 2019-12-20 RX ORDER — CLONIDINE HYDROCHLORIDE 0.1 MG/1
0.1 TABLET ORAL 3 TIMES DAILY
Qty: 90 TABLET | Refills: 0 | Status: SHIPPED | OUTPATIENT
Start: 2019-12-20 | End: 2020-01-24 | Stop reason: SDUPTHER

## 2019-12-20 RX ORDER — CLONIDINE HYDROCHLORIDE 0.1 MG/1
0.1 TABLET ORAL 3 TIMES DAILY
Qty: 90 TABLET | Refills: 0 | Status: SHIPPED | OUTPATIENT
Start: 2019-12-20 | End: 2019-12-20 | Stop reason: SDUPTHER

## 2019-12-20 NOTE — PATIENT INSTRUCTIONS
Clonidine .1 mg      Take 1 tab if top number is greater than 150 or bottom is greater than 100. Repeat blood pressure in 2 hours. Can repeat a pill if same high number. Do not exceed more than 4 tab in 1 day.

## 2019-12-20 NOTE — PROGRESS NOTES
Subjective:       Patient ID: Shirley Metz is a 87 y.o. female.    Chief Complaint: Hypertension    F/U:         Pt is a 87 year old who on the 9th for Dec was cardio converted. Pt EKG showed Sinus ed after the procedure but 2 weeks now after BP has gone up to as high 160's.     Review of Systems   Constitutional: Negative.    Respiratory: Negative.    Cardiovascular: Negative.    Genitourinary: Negative.    Musculoskeletal: Negative.    Neurological: Negative.    Hematological: Negative.    Psychiatric/Behavioral: Negative.        Objective:      Physical Exam   Constitutional: She is oriented to person, place, and time. She appears well-developed and well-nourished.   Cardiovascular: Regular rhythm.   Pulmonary/Chest: Effort normal and breath sounds normal. No stridor. She has no wheezes.   Abdominal: Soft. Bowel sounds are normal.   Neurological: She is alert and oriented to person, place, and time.   Psychiatric: She has a normal mood and affect. Her behavior is normal.       Assessment:       1. Paroxysmal atrial fibrillation    2. Essential hypertension        Plan:       Paroxysmal atrial fibrillation  Comments:  Do not appreciate irregular rhythm on PE but will get xray  Orders:  -     SCHEDULED EKG 12-LEAD (to Muse); Future; Expected date: 12/20/2019    Essential hypertension  Comments:  Will send pt to Cardiology. Will start as needed Clonidine .1 mg if BP is elevated    Other orders  -     Discontinue: cloNIDine (CATAPRES) 0.1 MG tablet; Take 1 tablet (0.1 mg total) by mouth 3 (three) times daily.  Dispense: 90 tablet; Refill: 0  -     cloNIDine (CATAPRES) 0.1 MG tablet; Take 1 tablet (0.1 mg total) by mouth 3 (three) times daily.  Dispense: 90 tablet; Refill: 0

## 2019-12-23 ENCOUNTER — OFFICE VISIT (OUTPATIENT)
Dept: CARDIOLOGY | Facility: CLINIC | Age: 84
End: 2019-12-23
Payer: MEDICARE

## 2019-12-23 ENCOUNTER — PATIENT MESSAGE (OUTPATIENT)
Dept: CARDIOLOGY | Facility: CLINIC | Age: 84
End: 2019-12-23

## 2019-12-23 VITALS
DIASTOLIC BLOOD PRESSURE: 70 MMHG | HEIGHT: 64 IN | BODY MASS INDEX: 29.5 KG/M2 | SYSTOLIC BLOOD PRESSURE: 148 MMHG | OXYGEN SATURATION: 98 % | HEART RATE: 52 BPM | WEIGHT: 172.81 LBS

## 2019-12-23 DIAGNOSIS — I48.91 NEW ONSET A-FIB: ICD-10-CM

## 2019-12-23 DIAGNOSIS — E66.9 OBESITY (BMI 30.0-34.9): ICD-10-CM

## 2019-12-23 DIAGNOSIS — R94.31 ABNORMAL ECG: ICD-10-CM

## 2019-12-23 DIAGNOSIS — I11.9 HYPERTENSIVE LEFT VENTRICULAR HYPERTROPHY, WITHOUT HEART FAILURE: Chronic | ICD-10-CM

## 2019-12-23 DIAGNOSIS — I50.32 CHRONIC DIASTOLIC CONGESTIVE HEART FAILURE: ICD-10-CM

## 2019-12-23 DIAGNOSIS — I10 ACCELERATED HYPERTENSION: ICD-10-CM

## 2019-12-23 DIAGNOSIS — D64.9 ANEMIA, UNSPECIFIED TYPE: ICD-10-CM

## 2019-12-23 DIAGNOSIS — I10 ESSENTIAL HYPERTENSION: Primary | Chronic | ICD-10-CM

## 2019-12-23 DIAGNOSIS — E78.49 OTHER HYPERLIPIDEMIA: Chronic | ICD-10-CM

## 2019-12-23 PROCEDURE — 1126F PR PAIN SEVERITY QUANTIFIED, NO PAIN PRESENT: ICD-10-PCS | Mod: HCNC,S$GLB,, | Performed by: PHYSICIAN ASSISTANT

## 2019-12-23 PROCEDURE — 99999 PR PBB SHADOW E&M-EST. PATIENT-LVL IV: ICD-10-PCS | Mod: PBBFAC,HCNC,, | Performed by: PHYSICIAN ASSISTANT

## 2019-12-23 PROCEDURE — 99999 PR PBB SHADOW E&M-EST. PATIENT-LVL IV: CPT | Mod: PBBFAC,HCNC,, | Performed by: PHYSICIAN ASSISTANT

## 2019-12-23 PROCEDURE — 1126F AMNT PAIN NOTED NONE PRSNT: CPT | Mod: HCNC,S$GLB,, | Performed by: PHYSICIAN ASSISTANT

## 2019-12-23 PROCEDURE — 1101F PT FALLS ASSESS-DOCD LE1/YR: CPT | Mod: HCNC,CPTII,S$GLB, | Performed by: PHYSICIAN ASSISTANT

## 2019-12-23 PROCEDURE — 1101F PR PT FALLS ASSESS DOC 0-1 FALLS W/OUT INJ PAST YR: ICD-10-PCS | Mod: HCNC,CPTII,S$GLB, | Performed by: PHYSICIAN ASSISTANT

## 2019-12-23 PROCEDURE — 99214 OFFICE O/P EST MOD 30 MIN: CPT | Mod: HCNC,S$GLB,, | Performed by: PHYSICIAN ASSISTANT

## 2019-12-23 PROCEDURE — 1159F MED LIST DOCD IN RCRD: CPT | Mod: HCNC,S$GLB,, | Performed by: PHYSICIAN ASSISTANT

## 2019-12-23 PROCEDURE — 99214 PR OFFICE/OUTPT VISIT, EST, LEVL IV, 30-39 MIN: ICD-10-PCS | Mod: HCNC,S$GLB,, | Performed by: PHYSICIAN ASSISTANT

## 2019-12-23 PROCEDURE — 1159F PR MEDICATION LIST DOCUMENTED IN MEDICAL RECORD: ICD-10-PCS | Mod: HCNC,S$GLB,, | Performed by: PHYSICIAN ASSISTANT

## 2019-12-23 RX ORDER — LOSARTAN POTASSIUM 25 MG/1
25 TABLET ORAL DAILY
Qty: 90 TABLET | Refills: 3 | Status: SHIPPED | OUTPATIENT
Start: 2019-12-23 | End: 2020-01-20

## 2019-12-23 NOTE — PROGRESS NOTES
Subjective:    Patient ID:  Shirley Metz is a 87 y.o. female who presents for follow-up of HTN      HPI   Ms. Sg Metz is an 87 year old female patient whose current medical conditions include PAF (on Eliquis), HTN, hyperlipidemia, diastolic CHF, and CKD who presents today for follow-up. Patient returns today and states she is doing ok. Reports she has been having BP issues over the past 1-2 weeks, s/p cardioversion. Notices when it spikes, feels weak and tired when this happens. She saw seen by her PCP and given clonidine prn. Reviewed home log, BP variable with some systolic BP's in 170's-180's and some diastolic BP's in 100's. Other than that issue, patient seems to be doing well. No SOB, LE edema, PND, or orthopnea. No chest pain, heaviness, or tightness. No near syncope or syncope. BP ok today in clinic however patient did take a clonidine pill earlier this AM. Patient reports compliance with her medications. No bleeding issues. No s/s of TIA/CVA.    Review of Systems   Constitution: Positive for malaise/fatigue. Negative for chills, decreased appetite and fever.   HENT: Negative for congestion, hoarse voice and sore throat.    Eyes: Negative for blurred vision and discharge.   Cardiovascular: Negative for chest pain, claudication, cyanosis, dyspnea on exertion, irregular heartbeat, leg swelling, near-syncope, orthopnea, palpitations and paroxysmal nocturnal dyspnea.   Respiratory: Negative for cough, hemoptysis, shortness of breath, snoring, sputum production and wheezing.    Endocrine: Negative for cold intolerance and heat intolerance.   Hematologic/Lymphatic: Negative for bleeding problem. Does not bruise/bleed easily.   Skin: Negative for rash.   Musculoskeletal: Negative for arthritis, back pain, joint pain, joint swelling, muscle cramps, muscle weakness and myalgias.   Gastrointestinal: Negative for abdominal pain, constipation, diarrhea, heartburn, melena and nausea.   Genitourinary:  "Negative for hematuria.   Neurological: Positive for weakness. Negative for dizziness, focal weakness, headaches, light-headedness, loss of balance, numbness, paresthesias and seizures.   Psychiatric/Behavioral: Negative for memory loss. The patient does not have insomnia.    Allergic/Immunologic: Negative for hives.     BP (!) 148/70 (BP Location: Right arm, Patient Position: Sitting, BP Method: Medium (Manual))   Pulse (!) 52   Ht 5' 4" (1.626 m)   Wt 78.4 kg (172 lb 13.5 oz)   SpO2 98%   BMI 29.67 kg/m²     Objective:    Physical Exam   Constitutional: She is oriented to person, place, and time. She appears well-developed and well-nourished. No distress.   HENT:   Head: Normocephalic and atraumatic.   Eyes: Pupils are equal, round, and reactive to light. Right eye exhibits no discharge. Left eye exhibits no discharge.   Neck: Neck supple. No JVD present.   Cardiovascular: Regular rhythm, S1 normal, S2 normal, normal heart sounds and intact distal pulses. Bradycardia present.   No murmur heard.  Pulmonary/Chest: Effort normal and breath sounds normal. No respiratory distress. She has no wheezes. She has no rales.   Abdominal: Soft. She exhibits no distension.   Musculoskeletal: She exhibits no edema.   Neurological: She is alert and oriented to person, place, and time.   Skin: Skin is warm and dry. She is not diaphoretic. No erythema.   Psychiatric: She has a normal mood and affect. Her behavior is normal. Thought content normal.   Nursing note and vitals reviewed.    2D Echo CONCLUSIONS     1 - Normal left ventricular systolic function (EF 55-60%).     2 - Normal left ventricular diastolic function.     3 - Normal right ventricular systolic function .     4 - Mild to moderate mitral regurgitation.     5 - Concentric hypertrophy.   Assessment:       1. Essential hypertension    2. Abnormal ECG    3. Accelerated hypertension    4. Other hyperlipidemia    5. New onset a-fib    6. Chronic diastolic congestive " heart failure    7. Hypertensive left ventricular hypertrophy, without heart failure    8. Anemia, unspecified type    9. Obesity (BMI 30.0-34.9)      Patient presents for f/u. Doing ok but having BP spikes, has had to use clonidine prn on a few occasions. Multiple drug intolerances/allergies which can make BP control a challenge. She was previously on Losartan in the past and did well with this. I cannot find documentation as to why this was d/c'd. Will re-start at a low dose with close monitoring of her renal function. Ok to use clonidine prn, parameters given.  Plan:   -Add Losartan 25 mg daily  -Continue Metoprolol, Bumex  -Clonidine prn with parameters  -BMP scheduled 1/8/19   -Follow-up TBA pending review of labs  -Patient will review her records and contact me with the reason as to why Losartan was d/c'd

## 2019-12-29 ENCOUNTER — PATIENT MESSAGE (OUTPATIENT)
Dept: CARDIOLOGY | Facility: CLINIC | Age: 84
End: 2019-12-29

## 2019-12-30 ENCOUNTER — TELEPHONE (OUTPATIENT)
Dept: CARDIOLOGY | Facility: HOSPITAL | Age: 84
End: 2019-12-30

## 2019-12-30 DIAGNOSIS — I10 ESSENTIAL HYPERTENSION: Primary | Chronic | ICD-10-CM

## 2019-12-30 NOTE — TELEPHONE ENCOUNTER
Please phone patient and do symptom check. I reviewed her BP numbers and they do look better. Make sure she has started the Losartan 25 mg daily    She mentions stumbling and drooling in her patient message to me. Make sure she is taking Eliquis 2.5 mg BID, with these symptoms I would advise ED evaluation to make sure she has not had any stroke    Thanks

## 2019-12-30 NOTE — TELEPHONE ENCOUNTER
I spoke with patient. Advised ED evaluation/CT of head given her symptoms. She declined, stated she did not feel she was having a stroke. Thinks some of her symptoms could be due to Losartan.    Advised ok to hold Losartan for now, will reassess in AM. She is taking Eliquis 2.5 mg BID.    Can you see if she can have a BMP drawn today or tmw.  Thanks

## 2019-12-31 ENCOUNTER — TELEPHONE (OUTPATIENT)
Dept: CARDIOLOGY | Facility: HOSPITAL | Age: 84
End: 2019-12-31

## 2019-12-31 NOTE — TELEPHONE ENCOUNTER
Please do symptom check-I stopped her Losartan yesterday due to side effects.    If any concerning symptoms, I again recommend ED.    Thanks

## 2020-01-03 ENCOUNTER — PATIENT MESSAGE (OUTPATIENT)
Dept: CARDIOLOGY | Facility: CLINIC | Age: 85
End: 2020-01-03

## 2020-01-03 ENCOUNTER — HOSPITAL ENCOUNTER (EMERGENCY)
Facility: HOSPITAL | Age: 85
Discharge: HOME | End: 2020-01-03
Attending: EMERGENCY MEDICINE
Payer: MEDICARE

## 2020-01-03 VITALS
BODY MASS INDEX: 29.57 KG/M2 | RESPIRATION RATE: 18 BRPM | HEART RATE: 51 BPM | SYSTOLIC BLOOD PRESSURE: 151 MMHG | WEIGHT: 173.19 LBS | OXYGEN SATURATION: 96 % | HEIGHT: 64 IN | TEMPERATURE: 98 F | DIASTOLIC BLOOD PRESSURE: 68 MMHG

## 2020-01-03 DIAGNOSIS — I10 ESSENTIAL HYPERTENSION: Primary | ICD-10-CM

## 2020-01-03 PROCEDURE — 99284 EMERGENCY DEPT VISIT MOD MDM: CPT | Mod: HCNC,,, | Performed by: INTERNAL MEDICINE

## 2020-01-03 PROCEDURE — 99281 EMR DPT VST MAYX REQ PHY/QHP: CPT | Mod: HCNC

## 2020-01-03 PROCEDURE — 99284 PR EMERGENCY DEPT VISIT,LEVEL IV: ICD-10-PCS | Mod: HCNC,,, | Performed by: INTERNAL MEDICINE

## 2020-01-03 NOTE — SUBJECTIVE & OBJECTIVE
Past Medical History:   Diagnosis Date    Anemia 11/29/2018    Anxiety 11/28/2017    Arthritis     Atrial fibrillation with RVR 10/9/2019    Back pain     Basal cell carcinoma 03/29/2018    left mid back    Chronic diastolic congestive heart failure 10/15/2019    Colon polyps     reported per patient.     Fuchs' corneal dystrophy     General anesthetics causing adverse effect in therapeutic use     woke up during surgery and gagged on ET tube    Glaucoma     Glaucoma     Heart failure with preserved left ventricular function (HFpEF) 7/24/2018    Hyperlipidemia     Hypertension     Macular degeneration dry    Obesity     Squamous cell carcinoma 01/08/2019    left shoulder    Trouble in sleeping     Urinary tract infection        Past Surgical History:   Procedure Laterality Date    ADENOIDECTOMY  1938    BREAST BIOPSY Left     1997. benign    BREAST CYST EXCISION Left 1997 approx    CARPAL TUNNEL RELEASE Right 2012 approx    CATARACT EXTRACTION Bilateral 1994    CHOLECYSTECTOMY  1975    CORNEAL TRANSPLANT Bilateral 08/2015 8/2015 - left; Right 4/2016    EYE SURGERY      Fuch's Corneal dystrophy - had 2nd operation with Dr. Quintanilla    EYE SURGERY      Cataract wIOL    left thumb Left 2011    removed cuboid for arthritis    REVERSE TOTAL SHOULDER ARTHROPLASTY Left 8/21/2018    Procedure: ARTHROPLASTY, SHOULDER, TOTAL, REVERSE;  Surgeon: Solomon Lujan MD;  Location: [facility] OR;  Service: Orthopedics;  Laterality: Left;  WITH BICEP TENODESIS    SKIN CANCER EXCISION Left 2017    BCC removal by Dr. Valadez    SLT- OS (aka TRABECULOPLASTY) Left 05/11/2011    repeat 3/14/12    TENOTOMY Left 8/21/2018    Procedure: TENOTOMY;  Surgeon: Solomon Lujan MD;  Location: [facility] OR;  Service: Orthopedics;  Laterality: Left;    TONSILLECTOMY  1938    TREATMENT OF CARDIAC ARRHYTHMIA N/A 10/10/2019    Procedure: CARDIOVERSION;  Surgeon: Pete Durán MD;  Location: [facility] CATH LAB;  Service:  Cardiology;  Laterality: N/A;    TREATMENT OF CARDIAC ARRHYTHMIA N/A 12/6/2019    Procedure: CARDIOVERSION;  Surgeon: Samy Castillo MD;  Location: [facility] CATH LAB;  Service: Cardiology;  Laterality: N/A;       Review of patient's allergies indicates:   Allergen Reactions    Claritin [loratadine] Other (See Comments)     Double vision/spots    Hydrocodone-acetaminophen      wheezing    Labetalol Shortness Of Breath, Nausea Only and Other (See Comments)     dizziness    Naproxen      wheezing    Verapamil Diarrhea    Vibramycin [doxycycline calcium] Other (See Comments)     Weakness nausea   sob    Amlodipine      Myalgias      Coreg [carvedilol] Swelling     lips    Fenofibrate      Causes muscle weakness    Hydralazine analogues      Muscle tremors/aches      Isosorbide     Lasix [furosemide]      myalgias    Moxifloxacin Other (See Comments)     Hives   muscle soreness    Timolol     Adhesive Rash     Clonidine patch - 2 mfg - contact dermatitis    Allegra [fexofenadine] Other (See Comments)     Double vision/spots     Codeine Diarrhea and Other (See Comments)     Loss of balance     Erythromycin Other (See Comments)     Complete weakness/could not walk    Hydrocortisone (bulk) Other (See Comments)     Only suppository/ caused muscle weakness    Macrolide antibiotics Other (See Comments)     Complete weakness/could not walk    Patanol [olopatadine] Other (See Comments)     Muscle weakness    Prednisone Anxiety    Statins-hmg-coa reductase inhibitors Other (See Comments)     Muscle weakness    Xalatan [latanoprost] Other (See Comments)     Muscle weakness       Current Facility-Administered Medications on File Prior to Encounter   Medication    sodium chloride 0.9% flush 3 mL     Current Outpatient Medications on File Prior to Encounter   Medication Sig    acetaminophen (TYLENOL) 500 MG tablet Take 500 mg by mouth daily as needed. Taking 1 to 3    ALPRAZolam (XANAX) 0.5 MG tablet TAKE 1/2 - 1  TABLET BY MOUTH NIGHTLY FOR SLEEP OR ANXIETY    apixaban (ELIQUIS) 2.5 mg Tab Take 1 tablet (2.5 mg total) by mouth 2 (two) times daily.    bumetanide (BUMEX) 0.5 MG Tab Take 1 tablet (0.5 mg total) by mouth once daily.    cholecalciferol, vitamin D3, (VITAMIN D3) 2,000 unit Cap Take 1 capsule by mouth once daily.    cloNIDine (CATAPRES) 0.1 MG tablet Take 1 tablet (0.1 mg total) by mouth 3 (three) times daily.    coenzyme Q10 200 mg capsule Take 400 mg by mouth once daily.     fish oil-omega-3 fatty acids 300-1,000 mg capsule Take 2 g by mouth 2 (two) times daily.     LOTEMAX 0.5 % DrpG     metoprolol tartrate (LOPRESSOR) 50 MG tablet Take 1.5 tablets (75 mg total) by mouth 2 (two) times daily.    POLYETHYLENE GLYCOL 3350 (MIRALAX ORAL) Take by mouth as needed.     RHOPRESSA 0.02 % Drop once daily.     TRAVATAN Z 0.004 % ophthalmic solution PLACE 1 DROP INTO THE LEFT EYE EVERY EVENING.    losartan (COZAAR) 25 MG tablet Take 1 tablet (25 mg total) by mouth once daily.    ondansetron (ZOFRAN-ODT) 8 MG TbDL Take 1 tablet (8 mg total) by mouth every 8 (eight) hours as needed.     Family History     Problem Relation (Age of Onset)    Cancer Father (88)    Heart disease Mother    Hypertension Mother        Tobacco Use    Smoking status: Never Smoker    Smokeless tobacco: Never Used    Tobacco comment: history of passive smoking from her ex-   Substance and Sexual Activity    Alcohol use: Not Currently     Alcohol/week: 2.0 standard drinks     Types: 2 Standard drinks or equivalent per week     Frequency: Never     Comment: occasional     Drug use: No    Sexual activity: Not Currently     Partners: Male     Birth control/protection: Post-menopausal     Comment: discussed protection for STD's     Review of Systems   Constitution: Positive for malaise/fatigue. Negative for decreased appetite, diaphoresis, fever and night sweats.   HENT: Negative for nosebleeds.    Eyes: Negative for blurred vision  and double vision.   Cardiovascular: Positive for dyspnea on exertion. Negative for chest pain, claudication, irregular heartbeat, leg swelling, near-syncope, orthopnea, palpitations, paroxysmal nocturnal dyspnea and syncope.   Respiratory: Positive for shortness of breath. Negative for cough, sleep disturbances due to breathing, snoring, sputum production and wheezing.    Endocrine: Negative for cold intolerance and polyuria.   Hematologic/Lymphatic: Does not bruise/bleed easily.   Skin: Negative for rash.   Musculoskeletal: Positive for back pain. Negative for falls, joint pain, joint swelling and neck pain.   Gastrointestinal: Negative for abdominal pain, heartburn, nausea and vomiting.   Genitourinary: Negative for dysuria, frequency and hematuria.   Neurological: Positive for headaches. Negative for difficulty with concentration, dizziness, focal weakness, light-headedness, numbness, seizures and weakness.   Psychiatric/Behavioral: Negative for depression, memory loss and substance abuse. The patient does not have insomnia.    Allergic/Immunologic: Negative for HIV exposure and hives.     Objective:     Vital Signs (Most Recent):  Temp: 97.8 °F (36.6 °C) (01/03/20 0940)  Pulse: (!) 53 (01/03/20 1033)  Resp: 18 (01/03/20 0940)  BP: 129/60 (01/03/20 1033)  SpO2: 95 % (01/03/20 1033) Vital Signs (24h Range):  Temp:  [97.8 °F (36.6 °C)] 97.8 °F (36.6 °C)  Pulse:  [53-56] 53  Resp:  [18] 18  SpO2:  [95 %] 95 %  BP: (127-129)/(60-61) 129/60     Weight: 78.5 kg (173 lb 2.7 oz)  Body mass index is 29.72 kg/m².    SpO2: 95 %  O2 Device (Oxygen Therapy): room air    No intake or output data in the 24 hours ending 01/03/20 1109    Lines/Drains/Airways     None                 Physical Exam   Constitutional: She is oriented to person, place, and time. She appears well-nourished.   HENT:   Head: Normocephalic.   Eyes: Pupils are equal, round, and reactive to light.   Neck: Normal carotid pulses and no JVD present. Carotid  bruit is not present. No thyromegaly present.   Cardiovascular: Normal rate, regular rhythm, normal heart sounds and normal pulses.  No extrasystoles are present. PMI is not displaced. Exam reveals no gallop and no S3.   No murmur heard.  Pulmonary/Chest: Breath sounds normal. No stridor. No respiratory distress.   Abdominal: Soft. Bowel sounds are normal. There is no tenderness. There is no rebound.   Neurological: She is alert and oriented to person, place, and time.   Skin: Skin is intact. No rash noted.   Psychiatric: Her behavior is normal.       Significant Labs:   ABG: No results for input(s): PH, PCO2, HCO3, POCSATURATED, BE in the last 48 hours., Blood Culture: No results for input(s): LABBLOO in the last 48 hours., BMP: No results for input(s): GLU, NA, K, CL, CO2, BUN, CREATININE, CALCIUM, MG in the last 48 hours., CMP No results for input(s): NA, K, CL, CO2, GLU, BUN, CREATININE, CALCIUM, PROT, ALBUMIN, BILITOT, ALKPHOS, AST, ALT, ANIONGAP, ESTGFRAFRICA, EGFRNONAA in the last 48 hours., CBC No results for input(s): WBC, HGB, HCT, PLT in the last 48 hours., INR No results for input(s): INR, PROTIME in the last 48 hours.,   Pathology Results  (Last 10 years)    None       and Troponin No results for input(s): TROPONINI in the last 48 hours.    Significant Imaging: Echocardiogram:   2D echo with color flow doppler:   Results for orders placed or performed during the hospital encounter of 10/09/19   2D echo with color flow doppler   Result Value Ref Range    QEF 55 55 - 65    Mitral Valve Regurgitation MILD TO MODERATE     Diastolic Dysfunction No     Est. PA Systolic Pressure 40.04 (A)     Pericardial Effusion TRIVIAL     Tricuspid Valve Regurgitation MILD     Narrative    Date of Procedure: 10/09/2019        TEST DESCRIPTION   Technical Quality: This is a technically challenging study. There is poor endocardial definition.     General: The patient was in an irregularly irregular rhythm throughout the  study.     Aorta: The aortic root is normal in size, measuring 2.9 cm at sinotubular junction and 3.4 cm at Sinuses of Valsalva. The proximal ascending aorta is normal in size, measuring 2.9 cm across.     Left Atrium: The left atrial volume index is normal, measuring 22.02 cc/m2.     Left Ventricle: The left ventricle is normal in size, with an end-diastolic diameter of 3.8 cm, and an end-systolic diameter of 2.8 cm. Wall thickness is increased, with the septum measuring 1.7 cm and the posterior wall measuring 1.6 cm across. Relative   wall thickness was increased at 0.84, and the LV mass index was increased at 164.8 g/m2 consistent with concentric left ventricular hypertrophy. There are no regional wall motion abnormalities. Left ventricular systolic function appears normal. Visually   estimated ejection fraction is 55-60%. The LV Doppler derived stroke volume equals 60.0 ccs.     Diastolic indices: E wave velocity 1.0 m/s, E/A ratio 4.8,  msec., E/e' ratio(avg) 8. Diastolic function is normal.     Right Atrium: The right atrium is normal in size, measuring 3.9 cm in length in the apical view.     Right Ventricle: The right ventricle is normal in size. Global right ventricular systolic function appears normal. Tricuspid annular plane systolic excursion (TAPSE) is 1.5 cm. The estimated PA systolic pressure is 40 mmHg.     Aortic Valve:  The aortic valve is normal in structure. The mean gradient obtained across the aortic valve is 7 mmHg.     Mitral Valve:  The mitral valve is mildly sclerotic. The pressure half time is 62 msec. The calculated mitral valve area is 3.55 cm2. There is mild to moderate mitral regurgitation. There is mitral annular calcification.     Tricuspid Valve:  The tricuspid valve is normal in structure. There is mild tricuspid regurgitation.     Pulmonary Valve:  The pulmonic valve is not well seen.     Pericardium: There is evidence of a trivial pericardial effusion.     IVC: IVC is  enlarged but collapses > 50% with a sniff, suggesting intermediate right atrial pressure of 8 mmHg.     Intracavitary: There is no evidence of intracavity mass, thrombi, or vegetation.         CONCLUSIONS     1 - Normal left ventricular systolic function (EF 55-60%).     2 - Normal left ventricular diastolic function.     3 - Normal right ventricular systolic function .     4 - Mild to moderate mitral regurgitation.     5 - Concentric hypertrophy.             This document has been electronically    SIGNED BY: Pete Durán MD On: 10/09/2019 20:00

## 2020-01-03 NOTE — ASSESSMENT & PLAN NOTE
Episode of uncontrolled BP, improved after clonidine prn    -advise low salt diet  -taking Clonidine prn if SBP > 160 mmHG  -continue metoprolol and Losartan  -pt was allergic to multiple HTN meds

## 2020-01-03 NOTE — CONSULTS
[facility]  Cardiology  Consult Note    Patient Name: Shirley Metz  MRN: [id number]  Admission Date: 1/3/2020  Hospital Length of Stay: 0 days  Code Status: Prior   Attending Provider: Fili Whitlock MD   Consulting Provider: Samy Castillo MD  Primary Care Physician: Yandy Terrell MD  Principal Problem:<principal problem not specified>    Patient information was obtained from patient and ER records.     Inpatient consult to Cardiology  Consult performed by: Samy Castillo MD  Consult ordered by: Fili Whitlock MD        Subjective:       HPI:   86 yo female consult for HTN  F/u at cardiology service  [facility] pafib, + positive SELVIN, HTN, HLD chfpEF and CKD   echo showed EF 60%. LVH   Nulcear stress test no ischemia   10/07 saw pt in the office for C/o recent weakness,  stomach upset,. Has fast pulse. Some dizziness, and Monet. ekg showed new onset afib with RVR. Increased Mwetoprolol to 100 mg bid and add cardizem and eliquis 2.5 m bid.  10/09, admitted to [facility] for chest pain. Troponin 0.035 chronic elevation. Afib with RVR  . Echo showed normal EF and mild MR> Next day, STACEY showed + SELVIN thrombus and DCCV cancelled. Continued Eliquis  12/07 repeated STACEY no SELVIN thrombus and DCCV x1 converted to sinus  Log checked, BP and pulse controlled  Taking Bumex, eliquis 2.5 mg bid and Metoprolol. Could not tolerate Labetalol   Improved fatigue, leg swelling, imbalanced,     Today, c/p SBP at 190 mmHG with headache and weakness in AM and took Clonidine twice. When arrived ER. BR normal,.  Pt c/o chronic leg weakness and lower lip swelling.  No chest pain and dyspnea.   On regular rhythm    Past Medical History:   Diagnosis Date    Anemia 11/29/2018    Anxiety 11/28/2017    Arthritis     Atrial fibrillation with RVR 10/9/2019    Back pain     Basal cell carcinoma 03/29/2018    left mid back    Chronic diastolic congestive heart failure 10/15/2019    Colon polyps     reported  per patient.     Fuchs' corneal dystrophy     General anesthetics causing adverse effect in therapeutic use     woke up during surgery and gagged on ET tube    Glaucoma     Glaucoma     Heart failure with preserved left ventricular function (HFpEF) 7/24/2018    Hyperlipidemia     Hypertension     Macular degeneration dry    Obesity     Squamous cell carcinoma 01/08/2019    left shoulder    Trouble in sleeping     Urinary tract infection        Past Surgical History:   Procedure Laterality Date    ADENOIDECTOMY  1938    BREAST BIOPSY Left     1997. benign    BREAST CYST EXCISION Left 1997 approx    CARPAL TUNNEL RELEASE Right 2012 approx    CATARACT EXTRACTION Bilateral 1994    CHOLECYSTECTOMY  1975    CORNEAL TRANSPLANT Bilateral 08/2015 8/2015 - left; Right 4/2016    EYE SURGERY      Fuch's Corneal dystrophy - had 2nd operation with Dr. Quintanilla    EYE SURGERY      Cataract wIOL    left thumb Left 2011    removed cuboid for arthritis    REVERSE TOTAL SHOULDER ARTHROPLASTY Left 8/21/2018    Procedure: ARTHROPLASTY, SHOULDER, TOTAL, REVERSE;  Surgeon: Solomon Lujan MD;  Location: [facility] OR;  Service: Orthopedics;  Laterality: Left;  WITH BICEP TENODESIS    SKIN CANCER EXCISION Left 2017    BCC removal by Dr. Valadez    SLT- OS (aka TRABECULOPLASTY) Left 05/11/2011    repeat 3/14/12    TENOTOMY Left 8/21/2018    Procedure: TENOTOMY;  Surgeon: Solomon Lujan MD;  Location: [facility] OR;  Service: Orthopedics;  Laterality: Left;    TONSILLECTOMY  1938    TREATMENT OF CARDIAC ARRHYTHMIA N/A 10/10/2019    Procedure: CARDIOVERSION;  Surgeon: Pete Durán MD;  Location: [facility] CATH LAB;  Service: Cardiology;  Laterality: N/A;    TREATMENT OF CARDIAC ARRHYTHMIA N/A 12/6/2019    Procedure: CARDIOVERSION;  Surgeon: Samy Castillo MD;  Location: [facility] CATH LAB;  Service: Cardiology;  Laterality: N/A;       Review of patient's allergies indicates:   Allergen Reactions    Claritin [loratadine] Other  (See Comments)     Double vision/spots    Hydrocodone-acetaminophen      wheezing    Labetalol Shortness Of Breath, Nausea Only and Other (See Comments)     dizziness    Naproxen      wheezing    Verapamil Diarrhea    Vibramycin [doxycycline calcium] Other (See Comments)     Weakness nausea   sob    Amlodipine      Myalgias      Coreg [carvedilol] Swelling     lips    Fenofibrate      Causes muscle weakness    Hydralazine analogues      Muscle tremors/aches      Isosorbide     Lasix [furosemide]      myalgias    Moxifloxacin Other (See Comments)     Hives   muscle soreness    Timolol     Adhesive Rash     Clonidine patch - 2 mfg - contact dermatitis    Allegra [fexofenadine] Other (See Comments)     Double vision/spots     Codeine Diarrhea and Other (See Comments)     Loss of balance     Erythromycin Other (See Comments)     Complete weakness/could not walk    Hydrocortisone (bulk) Other (See Comments)     Only suppository/ caused muscle weakness    Macrolide antibiotics Other (See Comments)     Complete weakness/could not walk    Patanol [olopatadine] Other (See Comments)     Muscle weakness    Prednisone Anxiety    Statins-hmg-coa reductase inhibitors Other (See Comments)     Muscle weakness    Xalatan [latanoprost] Other (See Comments)     Muscle weakness       Current Facility-Administered Medications on File Prior to Encounter   Medication    sodium chloride 0.9% flush 3 mL     Current Outpatient Medications on File Prior to Encounter   Medication Sig    acetaminophen (TYLENOL) 500 MG tablet Take 500 mg by mouth daily as needed. Taking 1 to 3    ALPRAZolam (XANAX) 0.5 MG tablet TAKE 1/2 - 1 TABLET BY MOUTH NIGHTLY FOR SLEEP OR ANXIETY    apixaban (ELIQUIS) 2.5 mg Tab Take 1 tablet (2.5 mg total) by mouth 2 (two) times daily.    bumetanide (BUMEX) 0.5 MG Tab Take 1 tablet (0.5 mg total) by mouth once daily.    cholecalciferol, vitamin D3, (VITAMIN D3) 2,000 unit Cap Take 1 capsule by  mouth once daily.    cloNIDine (CATAPRES) 0.1 MG tablet Take 1 tablet (0.1 mg total) by mouth 3 (three) times daily.    coenzyme Q10 200 mg capsule Take 400 mg by mouth once daily.     fish oil-omega-3 fatty acids 300-1,000 mg capsule Take 2 g by mouth 2 (two) times daily.     LOTEMAX 0.5 % DrpG     metoprolol tartrate (LOPRESSOR) 50 MG tablet Take 1.5 tablets (75 mg total) by mouth 2 (two) times daily.    POLYETHYLENE GLYCOL 3350 (MIRALAX ORAL) Take by mouth as needed.     RHOPRESSA 0.02 % Drop once daily.     TRAVATAN Z 0.004 % ophthalmic solution PLACE 1 DROP INTO THE LEFT EYE EVERY EVENING.    losartan (COZAAR) 25 MG tablet Take 1 tablet (25 mg total) by mouth once daily.    ondansetron (ZOFRAN-ODT) 8 MG TbDL Take 1 tablet (8 mg total) by mouth every 8 (eight) hours as needed.     Family History     Problem Relation (Age of Onset)    Cancer Father (88)    Heart disease Mother    Hypertension Mother        Tobacco Use    Smoking status: Never Smoker    Smokeless tobacco: Never Used    Tobacco comment: history of passive smoking from her ex-   Substance and Sexual Activity    Alcohol use: Not Currently     Alcohol/week: 2.0 standard drinks     Types: 2 Standard drinks or equivalent per week     Frequency: Never     Comment: occasional     Drug use: No    Sexual activity: Not Currently     Partners: Male     Birth control/protection: Post-menopausal     Comment: discussed protection for STD's     Review of Systems   Constitution: Positive for malaise/fatigue. Negative for decreased appetite, diaphoresis, fever and night sweats.   HENT: Negative for nosebleeds.    Eyes: Negative for blurred vision and double vision.   Cardiovascular: Positive for dyspnea on exertion. Negative for chest pain, claudication, irregular heartbeat, leg swelling, near-syncope, orthopnea, palpitations, paroxysmal nocturnal dyspnea and syncope.   Respiratory: Positive for shortness of breath. Negative for cough, sleep  disturbances due to breathing, snoring, sputum production and wheezing.    Endocrine: Negative for cold intolerance and polyuria.   Hematologic/Lymphatic: Does not bruise/bleed easily.   Skin: Negative for rash.   Musculoskeletal: Positive for back pain. Negative for falls, joint pain, joint swelling and neck pain.   Gastrointestinal: Negative for abdominal pain, heartburn, nausea and vomiting.   Genitourinary: Negative for dysuria, frequency and hematuria.   Neurological: Positive for headaches. Negative for difficulty with concentration, dizziness, focal weakness, light-headedness, numbness, seizures and weakness.   Psychiatric/Behavioral: Negative for depression, memory loss and substance abuse. The patient does not have insomnia.    Allergic/Immunologic: Negative for HIV exposure and hives.     Objective:     Vital Signs (Most Recent):  Temp: 97.8 °F (36.6 °C) (01/03/20 0940)  Pulse: (!) 53 (01/03/20 1033)  Resp: 18 (01/03/20 0940)  BP: 129/60 (01/03/20 1033)  SpO2: 95 % (01/03/20 1033) Vital Signs (24h Range):  Temp:  [97.8 °F (36.6 °C)] 97.8 °F (36.6 °C)  Pulse:  [53-56] 53  Resp:  [18] 18  SpO2:  [95 %] 95 %  BP: (127-129)/(60-61) 129/60     Weight: 78.5 kg (173 lb 2.7 oz)  Body mass index is 29.72 kg/m².    SpO2: 95 %  O2 Device (Oxygen Therapy): room air    No intake or output data in the 24 hours ending 01/03/20 1109    Lines/Drains/Airways     None                 Physical Exam   Constitutional: She is oriented to person, place, and time. She appears well-nourished.   HENT:   Head: Normocephalic.   Eyes: Pupils are equal, round, and reactive to light.   Neck: Normal carotid pulses and no JVD present. Carotid bruit is not present. No thyromegaly present.   Cardiovascular: Normal rate, regular rhythm, normal heart sounds and normal pulses.  No extrasystoles are present. PMI is not displaced. Exam reveals no gallop and no S3.   No murmur heard.  Pulmonary/Chest: Breath sounds normal. No stridor. No  respiratory distress.   Abdominal: Soft. Bowel sounds are normal. There is no tenderness. There is no rebound.   Neurological: She is alert and oriented to person, place, and time.   Skin: Skin is intact. No rash noted.   Psychiatric: Her behavior is normal.       Significant Labs:   ABG: No results for input(s): PH, PCO2, HCO3, POCSATURATED, BE in the last 48 hours., Blood Culture: No results for input(s): LABBLOO in the last 48 hours., BMP: No results for input(s): GLU, NA, K, CL, CO2, BUN, CREATININE, CALCIUM, MG in the last 48 hours., CMP No results for input(s): NA, K, CL, CO2, GLU, BUN, CREATININE, CALCIUM, PROT, ALBUMIN, BILITOT, ALKPHOS, AST, ALT, ANIONGAP, ESTGFRAFRICA, EGFRNONAA in the last 48 hours., CBC No results for input(s): WBC, HGB, HCT, PLT in the last 48 hours., INR No results for input(s): INR, PROTIME in the last 48 hours.,   Pathology Results  (Last 10 years)    None       and Troponin No results for input(s): TROPONINI in the last 48 hours.    Significant Imaging: Echocardiogram:   2D echo with color flow doppler:   Results for orders placed or performed during the hospital encounter of 10/09/19   2D echo with color flow doppler   Result Value Ref Range    QEF 55 55 - 65    Mitral Valve Regurgitation MILD TO MODERATE     Diastolic Dysfunction No     Est. PA Systolic Pressure 40.04 (A)     Pericardial Effusion TRIVIAL     Tricuspid Valve Regurgitation MILD     Narrative    Date of Procedure: 10/09/2019        TEST DESCRIPTION   Technical Quality: This is a technically challenging study. There is poor endocardial definition.     General: The patient was in an irregularly irregular rhythm throughout the study.     Aorta: The aortic root is normal in size, measuring 2.9 cm at sinotubular junction and 3.4 cm at Sinuses of Valsalva. The proximal ascending aorta is normal in size, measuring 2.9 cm across.     Left Atrium: The left atrial volume index is normal, measuring 22.02 cc/m2.     Left  Ventricle: The left ventricle is normal in size, with an end-diastolic diameter of 3.8 cm, and an end-systolic diameter of 2.8 cm. Wall thickness is increased, with the septum measuring 1.7 cm and the posterior wall measuring 1.6 cm across. Relative   wall thickness was increased at 0.84, and the LV mass index was increased at 164.8 g/m2 consistent with concentric left ventricular hypertrophy. There are no regional wall motion abnormalities. Left ventricular systolic function appears normal. Visually   estimated ejection fraction is 55-60%. The LV Doppler derived stroke volume equals 60.0 ccs.     Diastolic indices: E wave velocity 1.0 m/s, E/A ratio 4.8,  msec., E/e' ratio(avg) 8. Diastolic function is normal.     Right Atrium: The right atrium is normal in size, measuring 3.9 cm in length in the apical view.     Right Ventricle: The right ventricle is normal in size. Global right ventricular systolic function appears normal. Tricuspid annular plane systolic excursion (TAPSE) is 1.5 cm. The estimated PA systolic pressure is 40 mmHg.     Aortic Valve:  The aortic valve is normal in structure. The mean gradient obtained across the aortic valve is 7 mmHg.     Mitral Valve:  The mitral valve is mildly sclerotic. The pressure half time is 62 msec. The calculated mitral valve area is 3.55 cm2. There is mild to moderate mitral regurgitation. There is mitral annular calcification.     Tricuspid Valve:  The tricuspid valve is normal in structure. There is mild tricuspid regurgitation.     Pulmonary Valve:  The pulmonic valve is not well seen.     Pericardium: There is evidence of a trivial pericardial effusion.     IVC: IVC is enlarged but collapses > 50% with a sniff, suggesting intermediate right atrial pressure of 8 mmHg.     Intracavitary: There is no evidence of intracavity mass, thrombi, or vegetation.         CONCLUSIONS     1 - Normal left ventricular systolic function (EF 55-60%).     2 - Normal left  ventricular diastolic function.     3 - Normal right ventricular systolic function .     4 - Mild to moderate mitral regurgitation.     5 - Concentric hypertrophy.             This document has been electronically    SIGNED BY: Pete Durán MD On: 10/09/2019 20:00     Assessment and Plan:     A-fib  Sinus  Continue ELiquis 2.5 mg bid and BB    Chronic diastolic congestive heart failure  CHFpEF compensated  Continue Bumex    Accelerated hypertension  Episode of uncontrolled BP, improved after clonidine prn    -advise low salt diet  -taking Clonidine prn if SBP > 160 mmHG  -continue metoprolol and Losartan  -pt was allergic to multiple HTN meds        VTE Risk Mitigation (From admission, onward)    None          Thank you for your consult. I will sign off. Please contact us if you have any additional questions.    Samy Castillo MD  Cardiology   [facility]

## 2020-01-03 NOTE — ED PROVIDER NOTES
"SCRIBE #1 NOTE: I, Taty Ruiz, am scribing for, and in the presence of, Fili Whitlock MD. I have scribed the entire note.       History     Chief Complaint   Patient presents with    Hypertension     with "really bad headache". took two 0.1 mg clonidine at 0600 and took home BP meds     Review of patient's allergies indicates:   Allergen Reactions    Claritin [loratadine] Other (See Comments)     Double vision/spots    Hydrocodone-acetaminophen      wheezing    Labetalol Shortness Of Breath, Nausea Only and Other (See Comments)     dizziness    Naproxen      wheezing    Verapamil Diarrhea    Vibramycin [doxycycline calcium] Other (See Comments)     Weakness nausea   sob    Amlodipine      Myalgias      Coreg [carvedilol] Swelling     lips    Fenofibrate      Causes muscle weakness    Hydralazine analogues      Muscle tremors/aches      Isosorbide     Lasix [furosemide]      myalgias    Moxifloxacin Other (See Comments)     Hives   muscle soreness    Timolol     Adhesive Rash     Clonidine patch - 2 mfg - contact dermatitis    Allegra [fexofenadine] Other (See Comments)     Double vision/spots     Codeine Diarrhea and Other (See Comments)     Loss of balance     Erythromycin Other (See Comments)     Complete weakness/could not walk    Hydrocortisone (bulk) Other (See Comments)     Only suppository/ caused muscle weakness    Macrolide antibiotics Other (See Comments)     Complete weakness/could not walk    Patanol [olopatadine] Other (See Comments)     Muscle weakness    Prednisone Anxiety    Statins-hmg-coa reductase inhibitors Other (See Comments)     Muscle weakness    Xalatan [latanoprost] Other (See Comments)     Muscle weakness         History of Present Illness     HPI    1/3/2020, 9:47 AM  History obtained from the patient      History of Present Illness: Shirley Metz is a 87 y.o. female patient with a PMHx of anemia, anxiety, Afib w/ RVR, HTN, HLD, obesity, and s/p " CHANDA COOK, and cardioversion (12/6/19) who presents to the Emergency Department for evaluation of hypertension which onset gradually this morning. Pt reports that she took her BP at home with a reading of 187 systolic, waited 20 minutes, and rechecked her BP with a reading of 193/93. Pt states that she has had difficulty regulating her BP over the last few months and has been evaluated in the ED twice in the last month for her BP. Symptoms are intermittent and moderate in severity. No mitigating or exacerbating factors reported. Associated sxs include HA, lip swelling (x1 month), and generalized weakness of BLE. Patient denies any speech difficulty, facial droop, confusion, neck stiffness, photophobia, visual disturbances,  fever/ chills, SOB, cough, CP, palpations, numbness, dizziness, rash, wound, abdominal pain, n/v/d, back/ neck pain, sore throat, congestion, dysuria, hematuria, localized weakness, easily bruising, and all other sxs at this time. Prior Tx includes two 0.1 mg Clonidine tablets this morning. No further complaints or concerns at this time.     Arrival mode: Personal vehicle      PCP: Yandy Terrell MD        Past Medical History:  Past Medical History:   Diagnosis Date    Anemia 11/29/2018    Anxiety 11/28/2017    Arthritis     Atrial fibrillation with RVR 10/9/2019    Back pain     Basal cell carcinoma 03/29/2018    left mid back    Chronic diastolic congestive heart failure 10/15/2019    Colon polyps     reported per patient.     Fuchs' corneal dystrophy     General anesthetics causing adverse effect in therapeutic use     woke up during surgery and gagged on ET tube    Glaucoma     Glaucoma     Heart failure with preserved left ventricular function (HFpEF) 7/24/2018    Hyperlipidemia     Hypertension     Macular degeneration dry    Obesity     Squamous cell carcinoma 01/08/2019    left shoulder    Trouble in sleeping     Urinary tract infection        Past Surgical  History:  Past Surgical History:   Procedure Laterality Date    ADENOIDECTOMY  1938    BREAST BIOPSY Left     1997. benign    BREAST CYST EXCISION Left 1997 approx    CARPAL TUNNEL RELEASE Right 2012 approx    CATARACT EXTRACTION Bilateral 1994    CHOLECYSTECTOMY  1975    CORNEAL TRANSPLANT Bilateral 08/2015 8/2015 - left; Right 4/2016    EYE SURGERY      Fuch's Corneal dystrophy - had 2nd operation with Dr. Quintanilla    EYE SURGERY      Cataract wIOL    left thumb Left 2011    removed cuboid for arthritis    REVERSE TOTAL SHOULDER ARTHROPLASTY Left 8/21/2018    Procedure: ARTHROPLASTY, SHOULDER, TOTAL, REVERSE;  Surgeon: Solomon Lujan MD;  Location: [facility] OR;  Service: Orthopedics;  Laterality: Left;  WITH BICEP TENODESIS    SKIN CANCER EXCISION Left 2017    BCC removal by Dr. Valadez    SLT- OS (aka TRABECULOPLASTY) Left 05/11/2011    repeat 3/14/12    TENOTOMY Left 8/21/2018    Procedure: TENOTOMY;  Surgeon: Solomon Lujan MD;  Location: [facility] OR;  Service: Orthopedics;  Laterality: Left;    TONSILLECTOMY  1938    TREATMENT OF CARDIAC ARRHYTHMIA N/A 10/10/2019    Procedure: CARDIOVERSION;  Surgeon: Pete Durán MD;  Location: [facility] CATH LAB;  Service: Cardiology;  Laterality: N/A;    TREATMENT OF CARDIAC ARRHYTHMIA N/A 12/6/2019    Procedure: CARDIOVERSION;  Surgeon: Samy Castillo MD;  Location: [facility] CATH LAB;  Service: Cardiology;  Laterality: N/A;         Family History:  Family History   Problem Relation Age of Onset    Heart disease Mother     Hypertension Mother     Cancer Father 88        sarcoma( ?Kaposi's ) on leg    Deep vein thrombosis Neg Hx     Ovarian cancer Neg Hx     Breast cancer Neg Hx     Kidney disease Neg Hx     Strabismus Neg Hx     Retinal detachment Neg Hx     Macular degeneration Neg Hx     Glaucoma Neg Hx     Blindness Neg Hx     Amblyopia Neg Hx     Eczema Neg Hx     Lupus Neg Hx     Melanoma Neg Hx     Psoriasis Neg Hx     Diabetes Neg Hx      Stroke Neg Hx     Mental retardation Neg Hx     Mental illness Neg Hx     Hyperlipidemia Neg Hx     COPD Neg Hx     Asthma Neg Hx     Depression Neg Hx     Alcohol abuse Neg Hx     Drug abuse Neg Hx        Social History:  Social History     Tobacco Use    Smoking status: Never Smoker    Smokeless tobacco: Never Used    Tobacco comment: history of passive smoking from her ex-   Substance and Sexual Activity    Alcohol use: Not Currently     Alcohol/week: 2.0 standard drinks     Types: 2 Standard drinks or equivalent per week     Frequency: Never     Comment: occasional     Drug use: No    Sexual activity: Not Currently     Partners: Male     Birth control/protection: Post-menopausal     Comment: discussed protection for STD's        Review of Systems     Review of Systems   Constitutional: Negative for chills and fever.        + Generalized weakness of BLE   HENT: Positive for facial swelling (lips, x1 month). Negative for congestion and sore throat.    Eyes: Negative for photophobia and visual disturbance.   Respiratory: Negative for cough and shortness of breath.    Cardiovascular: Negative for chest pain and palpitations.   Gastrointestinal: Negative for abdominal pain, diarrhea, nausea and vomiting.   Genitourinary: Negative for dysuria and hematuria.   Musculoskeletal: Negative for back pain, neck pain and neck stiffness.   Skin: Negative for rash and wound.   Neurological: Positive for headaches. Negative for dizziness, facial asymmetry, speech difficulty, weakness and numbness.   Hematological: Does not bruise/bleed easily.   Psychiatric/Behavioral: Negative for confusion.   All other systems reviewed and are negative.     Physical Exam     Initial Vitals [01/03/20 0940]   BP Pulse Resp Temp SpO2   127/61 (!) 56 18 97.8 °F (36.6 °C) 95 %      MAP       --          Physical Exam  Nursing Notes and Vital Signs Reviewed.  Constitutional: Patient is in no acute distress. Well-developed and  "well-nourished.  Head: Atraumatic. Normocephalic.  Eyes: PERRL. EOM intact. Conjunctivae are not pale. No scleral icterus.  ENT: Mucous membranes are moist. Oropharynx is clear and symmetric.    Neck: Supple. Full ROM. No lymphadenopathy.  Cardiovascular: Regular rate. Regular rhythm. No murmurs, rubs, or gallops. Distal pulses are 2+ and symmetric.  Pulmonary/Chest: No respiratory distress. Clear to auscultation bilaterally. No wheezing or rales.  Abdominal: Soft and non-distended.  There is no tenderness.  No rebound, guarding, or rigidity. Good bowel sounds.  Genitourinary: No CVA tenderness  Musculoskeletal: Moves all extremities. No obvious deformities. No edema. No calf tenderness.  Skin: Warm and dry.  Neurological:  Alert, awake, and appropriate.  Normal speech.  No acute focal neurological deficits are appreciated.  Psychiatric: Normal affect. Good eye contact. Appropriate in content.     ED Course   Procedures  ED Vital Signs:  Vitals:    01/03/20 0940 01/03/20 1033 01/03/20 1114   BP: 127/61 129/60 (!) 151/68   Pulse: (!) 56 (!) 53 (!) 51   Resp: 18  18   Temp: 97.8 °F (36.6 °C)     TempSrc: Oral     SpO2: 95% 95% 96%   Weight: 78.5 kg (173 lb 2.7 oz)     Height: 5' 4" (1.626 m)         Abnormal Lab Results:  Labs Reviewed - No data to display     All Lab Results:  None.     Imaging Results:  Imaging Results    None                 The Emergency Provider reviewed the vital signs and test results, which are outlined above.     ED Discussion     10:56 AM: Discussed pt's case with Dr. Samy Castillo (Cardiology) who evaluated the pt in the ED and advised the pt to take an additional Clonidine PRN when the pt notices her BP to be elevated. Dr. Castillo feels that the pt is stable to be discharged and does not need lab work at this time. Pt had a normal work-up in her outpatient appointment last week.     10:59 AM: Reassessed pt at this time.  Pt states her condition has improved at this time. Discussed with pt all " pertinent ED information. Discussed pt dx and plan of tx. Gave pt all f/u and return to the ED instructions. All questions and concerns were addressed at this time. Pt expresses understanding of information and instructions, and is comfortable with plan to discharge. Pt is stable for discharge.    Pre-hypertension/Hypertension: The pt has been informed that they may have pre-hypertension or hypertension based on a blood pressure reading in the ED. I recommend that the pt call the PCP listed on their discharge instructions or a physician of their choice this week to arrange f/u for further evaluation of possible pre-hypertension or hypertension.     I discussed with patient and/or family/caretaker that evaluation in the ED does not suggest any emergent or life threatening medical conditions requiring immediate intervention beyond what was provided in the ED, and I believe patient is safe for discharge.  Regardless, an unremarkable evaluation in the ED does not preclude the development or presence of a serious of life threatening condition. As such, patient was instructed to return immediately for any worsening or change in current symptoms.                   ED Medication(s):  Medications - No data to display    Discharge Medication List as of 1/3/2020 10:56 AM          Follow-up Information     Yandy Terrell MD.    Specialty:  Family Medicine  Contact information:  [address] [place] [address] [address] [place] [id number]  [phone]                       Scribe Attestation:   Scribe #1: I performed the above scribed service and the documentation accurately describes the services I performed. I attest to the accuracy of the note.     Attending:   Physician Attestation Statement for Scribe #1: I, Fili Whitlock MD, personally performed the services described in this documentation, as scribed by Taty Ruiz, in my presence, and it is both accurate and complete.           Clinical Impression       ICD-10-CM ICD-9-CM   1.  Essential hypertension I10 401.9       Disposition:   Disposition: Discharged  Condition: Stable         Fili Whitlock MD  01/03/20 1152

## 2020-01-03 NOTE — HPI
86 yo female consult for HTN  F/u at cardiology service  PMH pafib, + positive SELVIN, HTN, HLD chfpEF and CKD   echo showed EF 60%. LVH   Nulcear stress test no ischemia   10/07 saw pt in the office for C/o recent weakness,  stomach upset,. Has fast pulse. Some dizziness, and Monet. ekg showed new onset afib with RVR. Increased Mwetoprolol to 100 mg bid and add cardizem and eliquis 2.5 m bid.  10/09, admitted to [facility] for chest pain. Troponin 0.035 chronic elevation. Afib with RVR  . Echo showed normal EF and mild MR> Next day, STACEY showed + SELVIN thrombus and DCCV cancelled. Continued Eliquis  12/07 repeated STACEY no SELVIN thrombus and DCCV x1 converted to sinus  Log checked, BP and pulse controlled  Taking Bumex, eliquis 2.5 mg bid and Metoprolol. Could not tolerate Labetalol   Improved fatigue, leg swelling, imbalanced,     Today, c/p SBP at 190 mmHG with headache and weakness in AM and took Clonidine twice. When arrived ER. BR normal,.  Pt c/o chronic leg weakness and lower lip swelling.  No chest pain and dyspnea.   On regular rhythm

## 2020-01-06 ENCOUNTER — PATIENT MESSAGE (OUTPATIENT)
Dept: CARDIOLOGY | Facility: CLINIC | Age: 85
End: 2020-01-06

## 2020-01-08 ENCOUNTER — OFFICE VISIT (OUTPATIENT)
Dept: INTERNAL MEDICINE | Facility: CLINIC | Age: 85
End: 2020-01-08
Payer: MEDICARE

## 2020-01-08 VITALS
DIASTOLIC BLOOD PRESSURE: 86 MMHG | BODY MASS INDEX: 29.47 KG/M2 | WEIGHT: 172.63 LBS | HEART RATE: 52 BPM | OXYGEN SATURATION: 98 % | HEIGHT: 64 IN | SYSTOLIC BLOOD PRESSURE: 175 MMHG | TEMPERATURE: 98 F

## 2020-01-08 DIAGNOSIS — M72.0 DUPUYTREN'S CONTRACTURE OF LEFT HAND: ICD-10-CM

## 2020-01-08 DIAGNOSIS — R13.10 DYSPHAGIA, UNSPECIFIED TYPE: ICD-10-CM

## 2020-01-08 DIAGNOSIS — G47.00 INSOMNIA, UNSPECIFIED TYPE: ICD-10-CM

## 2020-01-08 DIAGNOSIS — I10 ESSENTIAL HYPERTENSION: Primary | Chronic | ICD-10-CM

## 2020-01-08 DIAGNOSIS — R73.03 PRE-DIABETES: ICD-10-CM

## 2020-01-08 DIAGNOSIS — I70.0 CALCIFICATION OF AORTA: Chronic | ICD-10-CM

## 2020-01-08 DIAGNOSIS — F41.9 ANXIETY: ICD-10-CM

## 2020-01-08 DIAGNOSIS — N18.30 CKD (CHRONIC KIDNEY DISEASE) STAGE 3, GFR 30-59 ML/MIN: ICD-10-CM

## 2020-01-08 DIAGNOSIS — K59.00 CONSTIPATION, UNSPECIFIED CONSTIPATION TYPE: ICD-10-CM

## 2020-01-08 PROBLEM — S39.012A LOW BACK STRAIN: Status: RESOLVED | Noted: 2019-01-22 | Resolved: 2020-01-08

## 2020-01-08 PROBLEM — M25.512 CHRONIC LEFT SHOULDER PAIN: Status: RESOLVED | Noted: 2018-01-22 | Resolved: 2020-01-08

## 2020-01-08 PROBLEM — M75.82 TENDINITIS OF LEFT ROTATOR CUFF: Status: RESOLVED | Noted: 2018-01-22 | Resolved: 2020-01-08

## 2020-01-08 PROBLEM — G89.29 CHRONIC LEFT SHOULDER PAIN: Status: RESOLVED | Noted: 2018-01-22 | Resolved: 2020-01-08

## 2020-01-08 LAB
ALBUMIN SERPL BCP-MCNC: 3.9 G/DL (ref 3.5–5.2)
ANION GAP SERPL CALC-SCNC: 8 MMOL/L (ref 8–16)
ANION GAP SERPL CALC-SCNC: 8 MMOL/L (ref 8–16)
BACTERIA #/AREA URNS AUTO: NORMAL /HPF
BILIRUB UR QL STRIP: NEGATIVE
BUN SERPL-MCNC: 26 MG/DL (ref 8–23)
BUN SERPL-MCNC: 26 MG/DL (ref 8–23)
CALCIUM SERPL-MCNC: 9.9 MG/DL (ref 8.7–10.5)
CALCIUM SERPL-MCNC: 9.9 MG/DL (ref 8.7–10.5)
CHLORIDE SERPL-SCNC: 103 MMOL/L (ref 95–110)
CHLORIDE SERPL-SCNC: 103 MMOL/L (ref 95–110)
CLARITY UR REFRACT.AUTO: CLEAR
CO2 SERPL-SCNC: 29 MMOL/L (ref 23–29)
CO2 SERPL-SCNC: 29 MMOL/L (ref 23–29)
COLOR UR AUTO: YELLOW
CREAT SERPL-MCNC: 1.3 MG/DL (ref 0.5–1.4)
CREAT SERPL-MCNC: 1.3 MG/DL (ref 0.5–1.4)
EST. GFR  (AFRICAN AMERICAN): 42.6 ML/MIN/1.73 M^2
EST. GFR  (AFRICAN AMERICAN): 42.6 ML/MIN/1.73 M^2
EST. GFR  (NON AFRICAN AMERICAN): 37 ML/MIN/1.73 M^2
EST. GFR  (NON AFRICAN AMERICAN): 37 ML/MIN/1.73 M^2
GLUCOSE SERPL-MCNC: 94 MG/DL (ref 70–110)
GLUCOSE SERPL-MCNC: 94 MG/DL (ref 70–110)
GLUCOSE UR QL STRIP: NEGATIVE
HGB UR QL STRIP: NEGATIVE
KETONES UR QL STRIP: NEGATIVE
LEUKOCYTE ESTERASE UR QL STRIP: NEGATIVE
MICROSCOPIC COMMENT: NORMAL
NITRITE UR QL STRIP: NEGATIVE
PH UR STRIP: 5 [PH] (ref 5–8)
PHOSPHATE SERPL-MCNC: 3.9 MG/DL (ref 2.7–4.5)
POTASSIUM SERPL-SCNC: 3.9 MMOL/L (ref 3.5–5.1)
POTASSIUM SERPL-SCNC: 3.9 MMOL/L (ref 3.5–5.1)
PROT UR QL STRIP: NEGATIVE
RBC #/AREA URNS AUTO: 2 /HPF (ref 0–4)
SODIUM SERPL-SCNC: 140 MMOL/L (ref 136–145)
SODIUM SERPL-SCNC: 140 MMOL/L (ref 136–145)
SP GR UR STRIP: 1.02 (ref 1–1.03)
SQUAMOUS #/AREA URNS AUTO: 1 /HPF
URN SPEC COLLECT METH UR: NORMAL
WBC #/AREA URNS AUTO: 0 /HPF (ref 0–5)

## 2020-01-08 PROCEDURE — 99214 OFFICE O/P EST MOD 30 MIN: CPT | Mod: HCNC,S$GLB,, | Performed by: FAMILY MEDICINE

## 2020-01-08 PROCEDURE — 1125F PR PAIN SEVERITY QUANTIFIED, PAIN PRESENT: ICD-10-PCS | Mod: HCNC,S$GLB,, | Performed by: FAMILY MEDICINE

## 2020-01-08 PROCEDURE — 81001 URINALYSIS AUTO W/SCOPE: CPT | Mod: HCNC

## 2020-01-08 PROCEDURE — 1159F MED LIST DOCD IN RCRD: CPT | Mod: HCNC,S$GLB,, | Performed by: FAMILY MEDICINE

## 2020-01-08 PROCEDURE — 99499 RISK ADDL DX/OHS AUDIT: ICD-10-PCS | Mod: HCNC,S$GLB,, | Performed by: FAMILY MEDICINE

## 2020-01-08 PROCEDURE — 1159F PR MEDICATION LIST DOCUMENTED IN MEDICAL RECORD: ICD-10-PCS | Mod: HCNC,S$GLB,, | Performed by: FAMILY MEDICINE

## 2020-01-08 PROCEDURE — 99999 PR PBB SHADOW E&M-EST. PATIENT-LVL IV: CPT | Mod: PBBFAC,HCNC,, | Performed by: FAMILY MEDICINE

## 2020-01-08 PROCEDURE — 99499 UNLISTED E&M SERVICE: CPT | Mod: HCNC,S$GLB,, | Performed by: FAMILY MEDICINE

## 2020-01-08 PROCEDURE — 1101F PT FALLS ASSESS-DOCD LE1/YR: CPT | Mod: HCNC,CPTII,S$GLB, | Performed by: FAMILY MEDICINE

## 2020-01-08 PROCEDURE — 99214 PR OFFICE/OUTPT VISIT, EST, LEVL IV, 30-39 MIN: ICD-10-PCS | Mod: HCNC,S$GLB,, | Performed by: FAMILY MEDICINE

## 2020-01-08 PROCEDURE — 1125F AMNT PAIN NOTED PAIN PRSNT: CPT | Mod: HCNC,S$GLB,, | Performed by: FAMILY MEDICINE

## 2020-01-08 PROCEDURE — 1101F PR PT FALLS ASSESS DOC 0-1 FALLS W/OUT INJ PAST YR: ICD-10-PCS | Mod: HCNC,CPTII,S$GLB, | Performed by: FAMILY MEDICINE

## 2020-01-08 PROCEDURE — 80069 RENAL FUNCTION PANEL: CPT | Mod: HCNC

## 2020-01-08 PROCEDURE — 99999 PR PBB SHADOW E&M-EST. PATIENT-LVL IV: ICD-10-PCS | Mod: PBBFAC,HCNC,, | Performed by: FAMILY MEDICINE

## 2020-01-08 PROCEDURE — 83036 HEMOGLOBIN GLYCOSYLATED A1C: CPT | Mod: HCNC

## 2020-01-08 RX ORDER — ALPRAZOLAM 0.5 MG/1
TABLET ORAL
Qty: 30 TABLET | Refills: 2 | Status: SHIPPED | OUTPATIENT
Start: 2020-01-08 | End: 2020-07-07

## 2020-01-08 NOTE — PROGRESS NOTES
"Subjective:       Patient ID: Shirley Metz is a 87 y.o. female.    Chief Complaint: follow-up HTN and Medication Refill    Here for scheduled 2 month recheck - she brings in a summary of problems since last seen. Has been to cards, ED. Brings in extensive records re BP readings - they are reviewed with pt. Readings can range from 113/57 to 189/103 2 weeks later in December.    Had addition of losartan 25mg 12/23 with subsequent rather prompt removal 2/2 "wasn't helping BP". Had been on losartan, valsartan in past over the years previously. Does not appear she was on it at time she was c/o mouth swelling in December - see 12/6/19 dates.    Went to ED 1/3/2020 for high BP not responding to clonidine prn. Dr Castillo encouraged her to lie down and relax if BP high rather than adjusting meds.    Hypertension   This is a chronic problem. The current episode started more than 1 year ago. The problem has been waxing and waning since onset. The problem is resistant. Associated symptoms include anxiety, blurred vision, malaise/fatigue, palpitations and shortness of breath. Pertinent negatives include no chest pain, headaches, neck pain, orthopnea, peripheral edema, PND or sweats. There are no associated agents to hypertension. Risk factors for coronary artery disease include stress. Past treatments include ACE inhibitors, beta blockers, calcium channel blockers and diuretics. The current treatment provides moderate improvement. Compliance problems include medication side effects.      Review of Systems   Constitutional: Positive for malaise/fatigue.   Eyes: Positive for blurred vision.   Respiratory: Positive for shortness of breath.    Cardiovascular: Positive for palpitations. Negative for chest pain, orthopnea and PND.   Musculoskeletal: Negative for neck pain.   Neurological: Negative for headaches.       Objective:      Physical Exam   Constitutional: She is oriented to person, place, and time. She appears " well-developed.   HENT:   Head: Normocephalic and atraumatic.   Cardiovascular: Normal rate, regular rhythm and normal heart sounds.   Pulmonary/Chest: Effort normal and breath sounds normal.   Neurological: She is alert and oriented to person, place, and time.   Skin: Skin is warm and dry.   Psychiatric: She has a normal mood and affect. Her behavior is normal.         Assessment/Plan:     1. Essential hypertension  Basic metabolic panel    Ambulatory Referral to Outpatient Case Management   2. CKD (chronic kidney disease) stage 3, GFR 30-59 ml/min  Urinalysis    Ambulatory Referral to Outpatient Case Management    Renal function panel   3. Dupuytren's contracture of left hand     4. Pre-diabetes  Hemoglobin A1c   5. Dysphagia, unspecified type  Ambulatory referral to Gastroenterology   6. Insomnia, unspecified type  ALPRAZolam (XANAX) 0.5 MG tablet   7. Anxiety  Ambulatory Referral to Outpatient Case Management    ALPRAZolam (XANAX) 0.5 MG tablet   8. Constipation, unspecified constipation type  Ambulatory referral to Gastroenterology   9. Calcification of aorta      Continues with hypertension problems with blood pressures, reactions to medications.  Anxiety without daily med - using xanax approx #15/month - 1/2 qhs.  She is to follow with renal, refer to GI.  Has Dupuytren's contracture but not currently a candidate for treatment.  Referred patient to outpatient case management due to multiple issues and frequent ED use.  Reviewed with pt the aortic calcification and implications.  Encouraged pt to follow Dr Castillo's rec re lying down if BP up.  Recheck 3 months.

## 2020-01-09 ENCOUNTER — TELEPHONE (OUTPATIENT)
Dept: INTERNAL MEDICINE | Facility: CLINIC | Age: 85
End: 2020-01-09

## 2020-01-09 ENCOUNTER — OUTPATIENT CASE MANAGEMENT (OUTPATIENT)
Dept: ADMINISTRATIVE | Facility: OTHER | Age: 85
End: 2020-01-09

## 2020-01-09 LAB
ESTIMATED AVG GLUCOSE: 137 MG/DL (ref 68–131)
HBA1C MFR BLD HPLC: 6.4 % (ref 4–5.6)

## 2020-01-09 NOTE — TELEPHONE ENCOUNTER
----- Message from Shirley Ryan RN sent at 1/9/2020  3:44 PM CST -----  Contact: Shirley Terrell/Staff,    Mrs Metz questioned me about a referral to a RD for nutritional counseling related to her diagnosis of CKD. I am sending literature related to CKD and associated diet recommendations, but she requested that I message you to see if you will refer her to a dietician. Thank you.     Kindest Regards,     Shirley Ryan RN, [facility]  Outpatient Case Management  [phone]  Ext [phone]  [email or web address]

## 2020-01-09 NOTE — PROGRESS NOTES
Outpatient Care Management  Initial Patient Assessment    Patient: Shirley Metz  MRN: [id number]  Date of Service: 01/09/2020  Completed by: Shirley Ryan RN  Referral Date: 01/08/2020  Program: Case Management (High Risk)    Reason for Visit   Patient presents with    OPCM Enrollment Call     Medication Reconciliation     Nursing Assessment    PHQ-9    Plan Of Care       Brief Summary: Pt is an 88 y/o female with diagnoses of neuropathy, anxiety, glaucoma, Fuch's endothelial dystrophy, HTN, CHF, newly diagnosed a fib, CKD3 and OA of multiple joints. She lives alone in an assisted living apt and reports she uses a rollator for all mobility. She reports having had no falls in the past 12 months. She is independent with most ADL's and IADL's and is assisted by the staff at the facility with those things she cannot manage, like housekeeping and driving. She reports good med compliance. She checks her BP three times a day and takes a half of a 0.1 mg Clonidine if it is >150/100. She reports she does not ever have to take a second dose as the first one always brings it down. She tries to follow diet restrictions for management of her HTN and CKD3, but states the renal diet is confusing. She questions whether this CM can refer her to a dietician. Advised this CM will message her PCP that referred her to OPCM and advise of her request and she can make referral, if in agreement. She is aware of her  [employer] OTC benefit, but is not accessing it. She is not aware of the transportation benefit or [employer] benefit and requests information related to both of these. Discussed that this CM will send literature about a fib, CKD and HTN and ways to help manage, including recommended diet for CKD and HTN and will follow up with her in 2 weeks. She voiced understanding and agreement with this plan.     Assessment Documentation     OPCM Initial Assessment    Involvement of Care  Do I have permission to speak with other  family members about your care?:  Yes (Comment: Arie, son)  Assessment completed by:  Patient  Patient Reported Insurance  Verified current insurance plan:  [employer] Medicare Advantage  [employer] benefits discussed:  Silver Sneakers, Mail Order Pharmacy, OTC prescription discounts, Transportation, [employer]  Current Health Status  Patient Health Rating  Compared to other people your age, how would you rate your health?:  Good  Patient Reported Labs & Vitals  Any patient reported labs and/or vitals?:  Yes  Patient reported blood pressure (mmHg):  152/73  Social Determinants  Advanced Care Planning  Do you have any of the following?:  Medical power of , Living will  If yes, do we have a copy?:  Yes (Comment: States it is on chart, but not soon in demographics. )  If no, would the patient like Advance Directive resources?:  N/A  Advanced Care Planning resources provided?:  No  Is Advanced Care Planning an area of need?:  No  Support  Present activity level:  not limited in any of these ways  Who takes you to your medical appointments?:  other (see comment), patient (Comment: Assisted living van. )  Housing  Living arrangements:  alone  Number of people in home:  1  Type of residence:  apartment  Own or rent?:  rent  Permanent residence?:  yes  Does the patient's residence have any of the following?:  grab bars in the bathroom  Is housing an area of need?:  no  Access to [employer]  Does the patient have access to any of the following devices or technologies?:  Internet access, home computer  Clinical Assessment  Medication Adherence  How does the patient obtain their medications?:  pharmacy delivery  How many days a week do you miss medications?:  never  Do you use a pill box or medication chart to help you manage your medications?:  no  Do you sometimes have difficulty refilling your medications?:  no  Medication reconciliation completed?:  Yes  Is medication adherence an area of need?:   No  *Active medication list was reviewed and reconciled with patient and/or caregiver:    Nutritional Status  Diet:  low sodium, other diet (see comments) (Comment: low carbs)  Change in appetite?:  no  Recent changes in weight?:  no  Dentition:  N/A  Is nutrition an area of need?:  no  Labs  Do you have regular lab work to monitor your medications?:  no  Is lab adherence an area of need?:  no  PHQ Depression Screen  Does patient's PHQ Depression Screening indicate a barrier to meeting self-care needs?:  No  Cognitive/Behavioral Health  Alert and oriented?:  yes  Difficulty thinking?:  no  Requires prompting?:  no  Requires assistance for routine expression?:  no  Is Cognitive/Behavioral health an area of need?:  no  Culture/Yazidism  Does patient have cultural or Quaker beliefs that may impact ability to access healthcare?:  no  Communication  Language preference:  English   needed?:  no  Hearing problems?:  no  Decreased vision?:  no  Legally blind?:  no  Vision assistance:  glasses  Is Communication an area of need?:  no  Health Literacy  Preferred learning method:  reading materials  How often do you need to have someone help you read instructions, pamphlets, or other written material from your doctor or pharmacy?:  never  Is there a Health Literacy need?:  no  Activities of Daily Living  Ambulation:  assistance required  Dressing:  independent  Bathing:  assistance required  Transfers:  independent  Toileting:  assistance required  Feeding:  independent  Cleaning home/chores:  assistance required  Telephone use:  independent  Shopping/attending doctors' appointments:  assistance required  Paying bills:  independent  Taking meds:  independent  Climbing stairs:  dependent  Fall Risk  Patient mobility status:  Ambulatory w/ assistance (Comment: Uses rollator with all mobility. )  Number of falls in the past 12 months:  0  Equipment/Current Services  Equipment/supplies used in home:  walker, cane, tub  seat, hearing or visual assistive devices  Current services:  n/a  Is Equipment/Supplies/Services an area of need?:  no  Community & Government Programs  Support:  none  Government suppot assistance:  N/A  [facility] Office of Aging and Adult Services:  N/A  Community Resource Assessment  Based on the assessment of needs:  No community resources needed at this time  Completion of Initial Assessment  Is the Initial Assessment Complete at this time?:  yes         Problem List and History     Patient Active Problem List   Diagnosis    HTN (hypertension)    Hyperlipemia    Primary osteoarthritis involving multiple joints    Macular degeneration - Both Eyes    Fuch's endothelial dystrophy - Both Eyes    Lens replaced by other means    COAG (chronic open-angle glaucoma) - Both Eyes    Blepharitis, unspecified - Both Eyes    Palpitations    Abnormal ECG    PVC's (premature ventricular contractions)    Myopathy    LVH (left ventricular hypertrophy) due to hypertensive disease    Osteopenia    Sciatica    Myalgia    Calcification of aorta    Neuropathy    Medication side effects    Hypertensive urgency    History of cornea transplant    Pseudophakia    Insomnia    Anxiety    Pre-diabetes    Obesity (BMI 30.0-34.9)    Elevated troponin    Accelerated hypertension    Metabolic syndrome    Bilateral shoulder region arthritis    Heart failure with preserved left ventricular function (HFpEF)    Arthritis of left shoulder region    Anemia    New onset a-fib    Thrombus of left atrial appendage    Chronic diastolic congestive heart failure    A-fib    Elevated liver enzymes       Reviewed Active Problem List with patient and/or Caregiver. The following were identified as areas of need: HTN, CKD3 and a fib    Medical History:  Reviewed medical history with patient and/or caregiver    Social History:  Reviewed social history with patient and/or caregiver    Complex Care Plan    Care plan was discussed  and completed today with input from patient and/or caregiver.    Goals      Patient/caregiver will have an action plan in place to manage and prevent complications of CKD prior to discharge from OPCM. - Priority: Med      Overall Time to Completion  2 months from 01/09/2020     OPCM Identified Patient Needs:  Advanced Care Planning:  No  Nutrition:  no  Housing:  no  Medication Adherence:  No  Lab Adherence:  no  Cognitive/Behavioral Health:  no  Communication:  no  Health Literacy:  no  Equipment/Supplies/Services:  no  Culural/Gnosticist Beliefs:  no  PHQ:  No       OPCM Identified Disease Education Needs:  Hypertension:  Pos - Care Plan Created  Chronic Kidney Disease:  Neg          Short Term Goals  Patient/caregiver will verbalize 2 signs and symptoms of Kidney Disease within 1 month.   Interventions   · Recognize and provide educational material (ANTONIO).     Status  · Partially met      Patient/caregiver will verbalize 2 ways of preventing complications due to disease process within 1 month.  Interventions   · Encourage compliance with Physician follow-ups.  · Encourage Dietary Compliance.  · Encourage Exercise.  · Encourage Medication Compliance.  · Recognize and provide educational material (ANTONIO).     Status  · Partially met    Patient/Caregiver agrees to receive educational literature related to CKD by 1/23/20.  Patient/Caregiver agrees to OPCM follow up within 2 weeks to assess progress to goal.            Clinical Reference Documents Added to Patient Instructions       Document    BALANCING CALCIUM AND PHOSPHORUS, KIDNEY DISEASE (ENGLISH)    CHOOSING THE RIGHT PROTEIN FOR YOUR BODY, KIDNEY DISEASE (ENGLISH)    CHRONIC KIDNEY DISEASE, DIET FOR (ENGLISH)    HEART-HEALTHY FOOD, EATING: USING THE DASH PLAN (ENGLISH)             Patient/caregiver will have an action plan in place to manage and prevent complications of high blood pressure prior to discharge from OPCM. - Priority: High      Overall Time to  Completion  2 months from 01/09/2020     OPCM Identified Patient Needs:  Advanced Care Planning:  No  Nutrition:  no  Housing:  no  Medication Adherence:  No  Lab Adherence:  no  Cognitive/Behavioral Health:  no  Communication:  no  Health Literacy:  no  Equipment/Supplies/Services:  no  Culural/Worship Beliefs:  no  PHQ:  No       OPCM Identified Disease Education Needs:  Hypertension:  Pos - Care Plan Created  Chronic Kidney Disease:  Neg         Short Term Goals      Patient/caregiver will verbalize 2 signs and symptoms of Hypertension within 3 weeks.   Interventions    · Recognize and provide educational material (ANTONIO).     Status  · Partially met      Patient/caregiver will verbalize 2 ways of preventing complications due to disease process within 3 weeks.  Interventions     · Encourage Dietary Compliance.  · Encourage Exercise.  · Encourage Medication Compliance.  · Recognize and provide educational material (ANTONIO).     Status  · Partially met    Patient/Caregiver agrees to receive educational literature related to HTN by 1/23/20.  Patient/Caregiver agrees to OPCM follow up within 2 weeks to assess progress to goal.          Clinical Reference Documents Added to Patient Instructions       Document    BALANCING CALCIUM AND PHOSPHORUS, KIDNEY DISEASE (ENGLISH)    CHOOSING THE RIGHT PROTEIN FOR YOUR BODY, KIDNEY DISEASE (ENGLISH)    CHRONIC KIDNEY DISEASE, DIET FOR (ENGLISH)    HEART-HEALTHY FOOD, EATING: USING THE DASH PLAN (ENGLISH)                  Patient Instructions     Instructions were provided via the [employer] patient resources and are available for the patient to view on the patient portal, if active.    Next steps: Mail educational literature as above and follow up in two weeks. Shirley Ryan RN    No follow-ups on file.    [facility] Self-Management Care Plan was developed with the patients/caregivers input and was based on identified barriers from todays assessment.  Goals were written  today with the patient/caregiver and the patient has agreed to work towards these goals to improve his/her overall well-being. Patient verbalized understanding of the care plan, goals, and all of today's instructions. Encouraged patient/caregiver to communicate with his/her physician and health care team about health conditions and the treatment plan.  Provided my contact information today and encouraged patient/caregiver to call me with any questions as needed.

## 2020-01-09 NOTE — LETTER
January 9, 2020    Shirley Bettencourt Isaías  [address]  [address]  [place] [address]             [facility]  [address]  [place] [address] Welcome to [facility]s Complex Care Management Program.  It was a pleasure talking with you today.  My name is Shirley Ryan and I look forward to being your Care Manager.  My goal is to help you function at the healthiest and highest level possible.  You can contact me directly at [phone].    As an [facility] patient, some of the services we may be able to provide include:      Development of an individualized care plan with a Registered Nurse    Connection with a    Connection with available resources and services     Coordinate communication among your care team members    Provide coaching and education    Help you understand your doctors treatment plan   Help you obtain information about your insurance coverage.     All services provided by [facility]s Complex Care Managers and other care team members are coordinated with and communicated to your primary care team.    As part of your enrollment, you will be receiving education materials and more information about these services in your [facility] account, by phone or through the mail.  If you do not wish to participate or receive information, please contact our office at [phone].      Sincerely,        Shirley Banks RN, CCM [facility]   Out-patient RN Complex Care Manager

## 2020-01-09 NOTE — PATIENT INSTRUCTIONS
Kidney Disease: Balancing Calcium and Phosphorus    Calcium and phosphorus are minerals found in many foods. Your body works best when these minerals are in balance. But if you have kidney disease, phosphorus may build up in your blood. This can weaken your bones over time. To help keep your bones strong, control the amount of phosphorus in your body. This sheet tells you how.  Take phosphate binders  Phosphate binders are medicines that stick to the phosphorus in the food you eat. This keeps the phosphorus from being absorbed into your body. Instead, the phosphorus passes from your body with stool (solid waste). For best results, keep these tips in mind:  ? Use only the type of phosphate binder that your healthcare provider recommends. The type of binder that you should be taking is ___________________________  ? Always take phosphate binders as directed.  ? With meals  ? Other _________________________  ? Phosphate binders can cause constipation. You may need to eat more fiber or take stool softeners.  Limit these foods   Do not eat these foods   To keep calcium and phosphorus in balance, limit the amount of phosphorus you eat. To do so, eat less of the following foods:  · Milk  · Chocolate  · Cheese  · Beer  · Yogurt  · Soybeans  · Ice cream   Some foods are so high in phosphorus that you may need to stop eating them. Talk with your dietitian before eating these foods:  · Cola drinks  · Dried or baked beans  · Nuts and seeds of all kinds  · Peanut butter  · Split peas  · Whole-grain cereals   Date Last Reviewed: 1/1/2017 © 2000-2017 [employer]. [address] [place] [id number]. All rights reserved. This information is not intended as a substitute for professional medical care. Always follow your healthcare professional's instructions.        Kidney Disease: Choosing the Right Protein for Your Body  Choosing the right type and quantity of proteins you eat is important when you have  chronic kidney disease. This can affect your overall health and kidney function we well.    Choosing the right amount of protein  If you have kidney disease but are not yet on dialysis, you will most likely need a low-protein diet. This is important in slowing down the speed at which your kidneys are failing. The amount of protein you can eat daily will be calculated by the dietitian in the kidney clinic. It is based on your body weight, the degree of kidney failure, and your daily activities.  Eat your daily protein  The amount of protein that you can eat each day may change with time. Your healthcare provider determines your protein intake according to the stage of your kidney disease.  Your body weight is also a factor. If your protein intake is decreased, you may need to eat more calories from other types of food. Carbohydrates, such as bread and pasta, make good choices.  · I can eat _____ grams of protein each day.  · I should eat a total of _____ calories each day to maintain my body weight and muscle mass.  Choosing the right type of protein  People with kidney failure have to limit the amount of phosphorus in the diet. Unfortunately, many proteins contain high amounts of phosphorus and may have to be taken in limited amounts. Milk products are a good example because they have a lot of phosphorus. The choices of protein may also be limited because of cultural, Religion, and social values. Vegetarian, vegan, kosher, and halal diets are all good examples of diets with limited protein choices. Dietitians in the clinic can work out the protein types and amounts to suit your needs.  Date Last Reviewed: 1/1/2017  © 6335-1191 [employer]. [address] [place] [address], [place] [address]. All rights reserved. This information is not intended as a substitute for professional medical care. Always follow your healthcare professional's instructions.        Diet for Chronic Kidney Disease  Following a special  diet when you have kidney disease can help you stay as healthy as possible. Your healthcare provider or dietitian should make a special diet plan just for you.    Eating right  Here are some good eating rules to follow:  · Protein. Eating protein is important for your body. But too much protein can put a strain on your kidneys. Eating less protein may slow the progression of chronic kidney disease. Foods high in protein include meat, fish, eggs, cheese, and other dairy products. A registered dietitian can help you plan a diet that has the right amount of protein for you.  · Sodium. Having too much salt in your diet can make your body hold onto (retain) water. Ask your provider or dietitian how much sodium per day you are allowed. This will help you avoid fluid buildup in your body (fluid retention). It can also help control high blood pressure. Learn to read food labels to know how much sodium is in one serving. Foods high in salt include processed meats, canned and boxed foods, sauces, salted chips and snacks, pickled foods, frozen dinners, and restaurant and fast food.  · Fluids. If you have advanced kidney disease, you will need to limit the water and fluids you drink. If you dont, then too much water will build up in your body. The exact amount of fluid you can drink depends on how well your kidneys are working. Ask your provider how much water you can safely drink each day.  · Potassium. In advanced kidney disease, your potassium level can go dangerously high. This affects your heart. It can cause an irregular heartbeat (arrhythmia). Ask your provider or dietitian if you should limit potassium in your diet. Foods high in potassium include dairy products (milk, yogurt, cheese), dried beans, bananas, oranges, potatoes, tomatoes, spinach, cantaloupe, honeydew melon, dried fruits, and nuts.   · Calcium. Calcium is important to build strong bones. But foods high in calcium are also high in phosphorus, which can  take calcium from your bones. Limiting foods high in phosphorus will help keep calcium in your bones. Ask your provider how much calcium you should get each day.  · Phosphorus. In advanced kidney disease, your phosphorus level can go dangerously high. This affects many systems in the body and can damage your heart. Limit your intake of phosphorus-rich foods. These include dried beans and peas, nuts, peanut butter, cocoa, beer, cola drinks, and dairy products.  Date Last Reviewed: 8/1/2016  © 7988-3820 [employer]. [address] [place] [address]. All rights reserved. This information is not intended as a substitute for professional medical care. Always follow your healthcare professional's instructions.        Eating Heart-Healthy Food: Using the DASH Plan    Eating for your heart doesnt have to be hard or boring. You just need to know how to make healthier choices. The DASH eating plan has been developed to help you do just that. DASH stands for Dietary Approaches to Stop Hypertension. It is a plan that has been proven to be healthier for your heart and to lower your risk for high blood pressure. It can also help lower your risk for cancer, heart disease, osteoporosis, and diabetes.  Choosing from each food group  Choose foods from each of the food groups below each day. Try to get the recommended number of servings for each food group. The serving numbers are based on a diet of 2,000 calories a day. Talk to your doctor if youre unsure about your calorie needs. Along with getting the correct servings, the DASH plan also recommends a sodium intake less than 2,300 mg per day.        Grains  Servings: 6 to 8 a day  A serving is:  · 1 slice bread  · 1 ounce dry cereal  · Half a cup cooked rice, pasta or cereal  Best choices: Whole grains and any grains high in fiber. Vegetables  Servings: 4 to 5 a day  A serving is:  · 1 cup raw leafy vegetable  · Half a cup cut-up raw or cooked  vegetable  · Half a cup vegetable juice  Best choices: Fresh or frozen vegetables prepared without added salt or fat.   Fruits  Servings: 4 to 5 a day  A serving is:  · 1 medium fruit  · One-quarter cup dried fruit  · Half a cup fresh, frozen, or canned fruit  · Half a cup of 100% fruit juices  Best choices: A variety of fresh fruits of different colors. Whole fruits are a better choice than fruit juices. Low-fat or fat-free dairy  Servings: 2 to 3 a day  A serving is:  · 1 cup milk  · 1 cup yogurt  · One and a half ounces cheese  Best choices: Skim or 1% milk, low-fat or fat-free yogurt or buttermilk, and low-fat cheeses.         Lean meats, poultry, fish  Servings: 6 or fewer a day  A serving is:  · 1 ounce cooked meats, poultry, or fish  · 1 egg  Best choices: Lean poultry and fish. Trim away visible fat. Broil, grill, roast, or boil instead of frying. Remove skin from poultry before eating. Limit how much red meat you eat.  Nuts, seeds, beans  Servings: 4 to 5 a week  A serving is:  · One-third cup nuts (one and a half ounces)  · 2 tablespoons nut butter or seeds  · Half a cup cooked dry beans or legumes  Best choices: Dry roasted nuts with no salt added, lentils, kidney beans, garbanzo beans, and whole macias beans.   Fats and oils  Servings: 2 to 3 a day  A serving is:  · 1 teaspoon vegetable oil  · 1 teaspoon soft margarine  · 1 tablespoon mayonnaise  · 2 tablespoons salad dressing  Best choices: Nut and vegetable oils (nontropical vegetable oils), such as olive and canola oil. Sweets  Servings: 5 a week or fewer  A serving is:  · 1 tablespoon sugar, maple syrup, or honey  · 1 tablespoon jam or jelly  · 1 half-ounce jelly beans (about 15)  · 1 cup lemonade  Best choices: Dried fruit can be a satisfying sweet. Choose low-fat sweets. And watch your serving sizes!      For more on the DASH eating plan, visit:  www.nhlbi.nih.gov/health/health-topics/topics/dash   Date Last Reviewed: 6/1/2016  © 4025-6050 [employer]. [address] [place] [address], [place] [address]. All rights reserved. This information is not intended as a substitute for professional medical care. Always follow your healthcare professional's instructions.        Understanding Atrial Fibrillation    An arrhythmia is any problem with the speed or pattern of the heartbeat. Atrial fibrillation (AFib) is a common type of arrhythmia. It causes fast, chaotic electrical signals in the atria. This leads to poor functioning of the heart. It also affects how much blood your heart can pump out to the body.  Afib may occur once in a while and go away on its own. Or it may continue for longer periods and need treatment.  AFib can lead to serious problems, such as stroke. Your healthcare provider will need to monitor and manage it.  What happens during atrial fibrillation?   The heart has an electrical system that sends signals to control the heartbeat. As signals move through the heart, they tell the hearts upper chambers (atria) and lower chambers (ventricles) when to squeeze (contract) and relax. This lets blood move through the heart and out to the body and lungs.  With AFib, the atria receive abnormal signals. This causes them to contract in a fast and irregular way, and out of sync with the ventricles. When this happens, the atria also have a harder time moving blood into the ventricles. Blood may then pool in the atria, which increases the risk for blood clots and stroke. The ventricles also may contract too quickly and irregularly. As a result, they may not pump blood to the body and lungs as well as they should. This can weaken the heart muscle over time and cause heart failure.  What causes atrial fibrillation?  AFib is more common in older adults. It has many possible causes including:  · Coronary artery disease  · Heart valve disease  · Heart attack  · Heart surgery  · High blood pressure  · Thyroid disease  · Diabetes  · Lung disease  · Sleep  apnea  · Heavy alcohol use  In some cases of AFib, doctors do not know the cause.  What are the symptoms of atrial fibrillation?  AFib may or may not cause symptoms. If symptoms do occur, they may include:  · A fast, pounding, irregular heartbeat  · Shortness of breath  · Tiredness  · Dizziness or fainting  · Chest pain  How is atrial fibrillation treated?  Treatments for AFib can include any of the options below.  · Medicines. You may be prescribed:  ¨ Heart rate medicines to help slow down the heartbeat  ¨ Heart rhythm medicines to help the heart beat more regularly  ¨ Anti-clotting medicines to help reduce the risk for blood clots and stroke  · Electrical cardioversion. Your healthcare provider uses special pads or paddles to send one or more brief electrical shocks to the heart. This can help reset the heartbeat to normal.  · Ablation. Long, thin tubes called catheters are threaded through a blood vessel to the heart. There, the catheters send out hot or cold energy to the areas causing the abnormal signals. This energy destroys the problem tissue or cells. This improves the chances that your heart will stay in normal rhythm without using medicines. If your heart rate and rhythm cant be controlled, you may need ablation and a pacemaker. These will help control the heart rate and regularity of the heartbeat.  · Surgery. During surgery, your healthcare provider may use different methods to create scar tissue in the areas of the heart causing the abnormal signals. The scar tissue disrupts the abnormal signals and may stop AFib from occurring.  What are the complications of atrial fibrillation?  These can include:  · Blood clots  · Stroke  · Heart failure. This problem occurs when the heart muscle weakens so much that it can no longer pump blood well.  When should I call my healthcare provider?  Call your healthcare provider right away if you have any of these:  · Symptoms that dont get better with treatment, or  get worse  · New symptoms   Date Last Reviewed: 5/1/2016  © 6606-8261 [employer]. [address] [place] [address], [place] [address]. All rights reserved. This information is not intended as a substitute for professional medical care. Always follow your healthcare professional's instructions.

## 2020-01-10 ENCOUNTER — TELEPHONE (OUTPATIENT)
Dept: CARDIOLOGY | Facility: HOSPITAL | Age: 85
End: 2020-01-10

## 2020-01-10 NOTE — TELEPHONE ENCOUNTER
she sees Dr Coretz  1/16/20. Recommend she discuss diet changes with him/referral if indicated.  pls inform pt.

## 2020-01-15 ENCOUNTER — OFFICE VISIT (OUTPATIENT)
Dept: OPHTHALMOLOGY | Facility: CLINIC | Age: 85
End: 2020-01-15
Payer: MEDICARE

## 2020-01-15 ENCOUNTER — TELEPHONE (OUTPATIENT)
Dept: OPHTHALMOLOGY | Facility: CLINIC | Age: 85
End: 2020-01-15

## 2020-01-15 DIAGNOSIS — H04.123 DRY EYES, BILATERAL: Primary | ICD-10-CM

## 2020-01-15 DIAGNOSIS — H10.13 ALLERGIC CONJUNCTIVITIS, BILATERAL: ICD-10-CM

## 2020-01-15 PROCEDURE — 99999 PR PBB SHADOW E&M-EST. PATIENT-LVL I: ICD-10-PCS | Mod: PBBFAC,HCNC,, | Performed by: OPTOMETRIST

## 2020-01-15 PROCEDURE — 92014 COMPRE OPH EXAM EST PT 1/>: CPT | Mod: HCNC,S$GLB,, | Performed by: OPTOMETRIST

## 2020-01-15 PROCEDURE — 99999 PR PBB SHADOW E&M-EST. PATIENT-LVL I: CPT | Mod: PBBFAC,HCNC,, | Performed by: OPTOMETRIST

## 2020-01-15 PROCEDURE — 92014 PR EYE EXAM, EST PATIENT,COMPREHESV: ICD-10-PCS | Mod: HCNC,S$GLB,, | Performed by: OPTOMETRIST

## 2020-01-15 NOTE — TELEPHONE ENCOUNTER
Spoke with pt.  She is having pain and tearing OD>OS.  She is being treated by ENT for fungal infection.  She thinks her eye problems may be caused by an allergic reaction to one of her other meds.  She is seeing nephro tomorrow about this.  Appt today with .

## 2020-01-15 NOTE — PROGRESS NOTES
HPI     Both eye watering, hurting, blurred vision x several weeks getting worse.  Pain scale 7 this morning. Pain scale now 4.  Last eye visit 12/10/2019 [facility].  Last eye visit with [facility] 09/17/2018.  HX of Glaucoma     Last edited by Marlee Jeffries on 1/15/2020  2:39 PM. (History)            Assessment /Plan     For exam results, see Encounter Report.    Dry eyes, bilateral    Allergic conjunctivitis, bilateral      Increase PF Refresh gtts and Refresh gel    Start Al[place] bid OU    Continue all glaucoma meds. Lower IOP today.    RTC prn

## 2020-01-15 NOTE — TELEPHONE ENCOUNTER
----- Message from Josie Newman sent at 1/15/2020  9:57 AM CST -----  Contact: self  Type:  Needs Medical Advice    Who Called: pt  Symptoms (please be specific): eyes are running and burning and blurred vision   How long has patient had these symptoms: n/a  Pharmacy name and phone #:  n/a  Would the patient rather a call back or a response via [facility]? Call back  Best Call Back Number:[phone] or [phone]  Additional Information: pt states it has gotten progressively worse within the last two-three days. Pt sees Dr. Ware    Thanks,  Josie Newman

## 2020-01-16 ENCOUNTER — PATIENT MESSAGE (OUTPATIENT)
Dept: INTERNAL MEDICINE | Facility: CLINIC | Age: 85
End: 2020-01-16

## 2020-01-16 ENCOUNTER — PATIENT MESSAGE (OUTPATIENT)
Dept: CARDIOLOGY | Facility: CLINIC | Age: 85
End: 2020-01-16

## 2020-01-18 ENCOUNTER — NURSE TRIAGE (OUTPATIENT)
Dept: ADMINISTRATIVE | Facility: CLINIC | Age: 85
End: 2020-01-18

## 2020-01-18 NOTE — TELEPHONE ENCOUNTER
Reason for Disposition   [1] Systolic BP  >= 160 OR Diastolic >= 100 AND [2] cardiac or neurologic symptoms (e.g., chest pain, difficulty breathing, unsteady gait, blurred vision)    Additional Information   Negative: Difficult to awaken or acting confused (e.g., disoriented, slurred speech)   Negative: Severe difficulty breathing (e.g., struggling for each breath, speaks in single words)   Negative: [1] Weakness of the face, arm or leg on one side of the body AND [2] new onset   Negative: [1] Numbness (i.e., loss of sensation) of the face, arm or leg on one side of the body AND [2] new onset   Negative: [1] Chest pain lasts > 5 minutes AND [2] history of heart disease  (i.e., heart attack, bypass surgery, angina, angioplasty, CHF)   Negative: [1] Chest pain AND [2] took nitrogylcerin AND [3] pain was not relieved   Negative: Sounds like a life-threatening emergency to the triager   Negative: Symptom is main concern  (e.g., headache, chest pain)   Negative: Low blood pressure is main concern    Protocols used: HIGH BLOOD PRESSURE-A-    Pt stated her BP is 165/82 and she has blurred vision. Per triage protocol, Pt advised to go to ED. Pt verbalized understanding.

## 2020-01-19 ENCOUNTER — HOSPITAL ENCOUNTER (EMERGENCY)
Facility: HOSPITAL | Age: 85
Discharge: HOME | End: 2020-01-19
Attending: EMERGENCY MEDICINE
Payer: MEDICARE

## 2020-01-19 VITALS
OXYGEN SATURATION: 96 % | WEIGHT: 176.81 LBS | DIASTOLIC BLOOD PRESSURE: 90 MMHG | HEIGHT: 64 IN | RESPIRATION RATE: 21 BRPM | BODY MASS INDEX: 30.19 KG/M2 | TEMPERATURE: 98 F | SYSTOLIC BLOOD PRESSURE: 173 MMHG | HEART RATE: 47 BPM

## 2020-01-19 DIAGNOSIS — I10 ESSENTIAL HYPERTENSION: Primary | ICD-10-CM

## 2020-01-19 PROCEDURE — 99281 EMR DPT VST MAYX REQ PHY/QHP: CPT | Mod: HCNC

## 2020-01-19 NOTE — ED NOTES
Patient identifiers verified and correct for name and .    Pt states BP has been elevated this morning. Took Clonidine approx 1.5 hours prior to arrival but BP wasn't going down and was different in each arm so she called 911. Patient has not taken her routine blood pressure medicatin this morning. Denies pain and dizziness    LOC: The patient is awake, alert. Appropriate affect, oriented x 3, speech clear.  SKIN: Warm and dry, color consistent with ethnicity, normal skin turgor and moist mucus membranes,   MUSCULOSKELETAL: Moving all extremities spontaneously.   RESPIRATORY: Airway is patent, no accessory muscle use noted, bilateral breath sounds clear.  CARDIAC: Sinus ed without ectopy,no peripheral edema, capillary refill brisk.  :  Denies urinary symptoms.  ABDOMEN: Soft and non tender to palpation, no distention noted, bowel sounds present.  NEUROLOGIC: Eyes open spontaneously, behavior appropriate to situation, follows commands, facial expression symmetrical, purposeful motor response noted, normal sensation in all extremities.

## 2020-01-19 NOTE — ED PROVIDER NOTES
SCRIBE #1 NOTE: I, Bradford Castle, am scribing for, and in the presence of, Darell Spain MD. I have scribed the entire note.      History      Chief Complaint   Patient presents with    Hypertension     uncontrolled BP x 2-3 days with weakness       Review of patient's allergies indicates:   Allergen Reactions    Claritin [loratadine] Other (See Comments)     Double vision/spots    Hydrocodone-acetaminophen      wheezing    Labetalol Shortness Of Breath, Nausea Only and Other (See Comments)     dizziness    Naproxen      wheezing    Verapamil Diarrhea    Vibramycin [doxycycline calcium] Other (See Comments)     Weakness nausea   sob    Amlodipine      Myalgias      Coreg [carvedilol] Swelling     lips    Fenofibrate      Causes muscle weakness    Hydralazine analogues      Muscle tremors/aches      Isosorbide     Lasix [furosemide]      myalgias    Moxifloxacin Other (See Comments)     Hives   muscle soreness    Timolol     Adhesive Rash     Clonidine patch - 2 mfg - contact dermatitis    Allegra [fexofenadine] Other (See Comments)     Double vision/spots     Codeine Diarrhea and Other (See Comments)     Loss of balance     Erythromycin Other (See Comments)     Complete weakness/could not walk    Hydrocortisone (bulk) Other (See Comments)     Only suppository/ caused muscle weakness    Macrolide antibiotics Other (See Comments)     Complete weakness/could not walk    Patanol [olopatadine] Other (See Comments)     Muscle weakness    Prednisone Anxiety    Statins-hmg-coa reductase inhibitors Other (See Comments)     Muscle weakness    Xalatan [latanoprost] Other (See Comments)     Muscle weakness        HPI   HPI    1/19/2020, 8:45 AM   History obtained from the patient      History of Present Illness: Shirley Metz is a 87 y.o. female patient with a PMHx of hyperlipidemia and HTN who presents to the Emergency Department for uncontrolled BP which onset gradually 3-4 days ago.   Pt had an average systolic BP of 175 from 1/16-1/19. Symptoms are constant and moderate in severity. No mitigating or exacerbating factors reported. No associated sxs included. Patient denies any fatigue, blurred vision, palpitations, dizziness, lightheadedness, syncope, N/V, extremity weakness/numbness, confusion, and all other sxs at this time. Prior Tx includes 2 clonidine with no relief. Pt states she only takes clonidine prn, but rarely ever takes it. No further complaints or concerns at this time.       Arrival mode: EMS    PCP: Yandy Terrell MD       Past Medical History:  Past Medical History:   Diagnosis Date    Anemia 11/29/2018    Anxiety 11/28/2017    Arthritis     Atrial fibrillation with RVR 10/9/2019    Back pain     Basal cell carcinoma 03/29/2018    left mid back    Chronic diastolic congestive heart failure 10/15/2019    Colon polyps     reported per patient.     Fuchs' corneal dystrophy     General anesthetics causing adverse effect in therapeutic use     woke up during surgery and gagged on ET tube    Glaucoma     Glaucoma     Heart failure with preserved left ventricular function (HFpEF) 7/24/2018    Hyperlipidemia     Hypertension     Macular degeneration dry    Obesity     Squamous cell carcinoma 01/08/2019    left shoulder    Trouble in sleeping     Urinary tract infection        Past Surgical History:  Past Surgical History:   Procedure Laterality Date    ADENOIDECTOMY  1938    BREAST BIOPSY Left     1997. benign    BREAST CYST EXCISION Left 1997 approx    CARPAL TUNNEL RELEASE Right 2012 approx    CATARACT EXTRACTION Bilateral 1994    CHOLECYSTECTOMY  1975    CORNEAL TRANSPLANT Bilateral 08/2015 8/2015 - left; Right 4/2016    EYE SURGERY      Fuch's Corneal dystrophy - had 2nd operation with Dr. Quintanilla    EYE SURGERY      Cataract wIOL    left thumb Left 2011    removed cuboid for arthritis    REVERSE TOTAL SHOULDER ARTHROPLASTY Left 8/21/2018     Procedure: ARTHROPLASTY, SHOULDER, TOTAL, REVERSE;  Surgeon: Solomon Lujan MD;  Location: [facility] OR;  Service: Orthopedics;  Laterality: Left;  WITH BICEP TENODESIS    SKIN CANCER EXCISION Left 2017    BCC removal by Dr. Rory MAHONEY- OS (aka TRABECULOPLASTY) Left 05/11/2011    repeat 3/14/12    TENOTOMY Left 8/21/2018    Procedure: TENOTOMY;  Surgeon: Solomon Lujan MD;  Location: [facility] OR;  Service: Orthopedics;  Laterality: Left;    TONSILLECTOMY  1938    TREATMENT OF CARDIAC ARRHYTHMIA N/A 10/10/2019    Procedure: CARDIOVERSION;  Surgeon: Pete Durán MD;  Location: [facility] CATH LAB;  Service: Cardiology;  Laterality: N/A;    TREATMENT OF CARDIAC ARRHYTHMIA N/A 12/6/2019    Procedure: CARDIOVERSION;  Surgeon: Samy Castillo MD;  Location: [facility] CATH LAB;  Service: Cardiology;  Laterality: N/A;         Family History:  Family History   Problem Relation Age of Onset    Heart disease Mother     Hypertension Mother     Cancer Father 88        sarcoma( ?K[place]'s ) on leg    Deep vein thrombosis Neg Hx     Ovarian cancer Neg Hx     Breast cancer Neg Hx     Kidney disease Neg Hx     Strabismus Neg Hx     Retinal detachment Neg Hx     Macular degeneration Neg Hx     Glaucoma Neg Hx     Blindness Neg Hx     Amblyopia Neg Hx     Eczema Neg Hx     Lupus Neg Hx     Melanoma Neg Hx     Psoriasis Neg Hx     Diabetes Neg Hx     Stroke Neg Hx     Mental retardation Neg Hx     Mental illness Neg Hx     Hyperlipidemia Neg Hx     COPD Neg Hx     Asthma Neg Hx     Depression Neg Hx     Alcohol abuse Neg Hx     Drug abuse Neg Hx        Social History:  Social History     Tobacco Use    Smoking status: Never Smoker    Smokeless tobacco: Never Used    Tobacco comment: history of passive smoking from her ex-   Substance and Sexual Activity    Alcohol use: Not Currently     Alcohol/week: 2.0 standard drinks     Types: 2 Standard drinks or equivalent per week     Frequency: Never     Comment:  occasional     Drug use: No    Sexual activity: Not Currently     Partners: Male     Birth control/protection: Post-menopausal     Comment: discussed protection for STD's       ROS   Review of Systems   Constitutional: Negative for fatigue and fever.   HENT: Negative for sore throat.    Eyes: Negative for visual disturbance.   Respiratory: Negative for shortness of breath.    Cardiovascular: Negative for chest pain and palpitations.        (+) increased BP   Gastrointestinal: Negative for nausea and vomiting.   Genitourinary: Negative for dysuria.   Musculoskeletal: Negative for back pain.   Skin: Negative for rash.   Neurological: Negative for dizziness, syncope, weakness, light-headedness and numbness.   Hematological: Does not bruise/bleed easily.   Psychiatric/Behavioral: Negative for confusion.   All other systems reviewed and are negative.    Physical Exam      Initial Vitals [01/19/20 0802]   BP Pulse Resp Temp SpO2   (!) 184/74 (!) 49 20 98.2 °F (36.8 °C) 98 %      MAP       --          Physical Exam  Nursing Notes and Vital Signs Reviewed.  Constitutional: Patient is in no acute distress. Well-developed and well-nourished.  Head: Atraumatic. Normocephalic.  Eyes: PERRL. EOM intact. Conjunctivae are not pale. No scleral icterus.  ENT: Mucous membranes are moist. Oropharynx is clear and symmetric.    Neck: Supple. Full ROM. No lymphadenopathy.  Cardiovascular: Regular rate. Regular rhythm. No murmurs, rubs, or gallops. Distal pulses are 2+ and symmetric.  Pulmonary/Chest: No respiratory distress. Clear to auscultation bilaterally. No wheezing or rales.  Abdominal: Soft and non-distended.  There is no tenderness.  No rebound, guarding, or rigidity. Good bowel sounds.  Genitourinary: No CVA tenderness  Musculoskeletal: Moves all extremities. No obvious deformities. No edema. No calf tenderness.  Skin: Warm and dry.  Neurological:  Alert, awake, and appropriate.  Normal speech.  No acute focal neurological  "deficits are appreciated.  Psychiatric: Normal affect. Good eye contact. Appropriate in content.    ED Course    Procedures  ED Vital Signs:  Vitals:    01/19/20 0802 01/19/20 0845 01/19/20 0853 01/19/20 0856   BP: (!) 184/74 (!) 164/92  (!) 165/76   Pulse: (!) 49 (!) 47  (!) 49   Resp: 20 (!) 21  16   Temp: 98.2 °F (36.8 °C)      TempSrc: Oral      SpO2: 98% 96%     Weight:   80.2 kg (176 lb 12.9 oz)    Height: 5' 4" (1.626 m)       01/19/20 0930 01/19/20 1000   BP: (!) 188/79 (!) 173/90   Pulse: (!) 46 (!) 47   Resp: 19 (!) 21   Temp:     TempSrc:     SpO2: 96% 96%   Weight:     Height:         Imaging Results:  Imaging Results    None                 The Emergency Provider reviewed the vital signs and test results, which are outlined above.    ED Discussion     10:44 AM: Reassessed pt at this time.  Pt states her condition has improved at this time. Discussed with pt all pertinent ED information. Discussed pt dx and plan of tx. Gave pt all f/u and return to the ED instructions. All questions and concerns were addressed at this time. Pt expresses understanding of information and instructions, and is comfortable with plan to discharge. Pt is stable for discharge.    I discussed with patient and/or family/caretaker that evaluation in the ED does not suggest any emergent or life threatening medical conditions requiring immediate intervention beyond what was provided in the ED, and I believe patient is safe for discharge.  Regardless, an unremarkable evaluation in the ED does not preclude the development or presence of a serious of life threatening condition. As such, patient was instructed to return immediately for any worsening or change in current symptoms.    Pre-hypertension/Hypertension: The pt has been informed that they may have pre-hypertension or hypertension based on a blood pressure reading in the ED. I recommend that the pt call the PCP listed on their discharge instructions or a physician of their choice " this week to arrange f/u for further evaluation of possible pre-hypertension or hypertension.          ED Medication(s):  Medications - No data to display    Discharge Medication List as of 2020 10:45 AM           Medication List      ASK your doctor about these medications    acetaminophen 500 MG tablet  Commonly known as:  TYLENOL     ALPRAZolam 0.5 MG tablet  Commonly known as:  XANAX  TAKE 1/2 - 1 TABLET BY MOUTH NIGHTLY FOR SLEEP OR ANXIETY     apixaban 2.5 mg Tab  Commonly known as:  ELIQUIS  Take 1 tablet (2.5 mg total) by mouth 2 (two) times daily.     cloNIDine 0.1 MG tablet  Commonly known as:  CATAPRES  Take 1 tablet (0.1 mg total) by mouth 3 (three) times daily.     coenzyme Q10 200 mg capsule     fish oil-omega-3 fatty acids 300-1,000 mg capsule     Lotemax 0.5 % Drpg  Generic drug:  loteprednol etabonate     MIRALAX ORAL     Rhopressa 0.02 % Drop  Generic drug:  netarsudil     Travatan Z 0.004 % ophthalmic solution  Generic drug:  travoprost  PLACE 1 DROP INTO THE LEFT EYE EVERY EVENING.     Vitamin D3 50 mcg (2,000 unit) Cap  Generic drug:  cholecalciferol (vitamin D3)            Follow-up Information     Schedule an appointment as soon as possible for a visit  with Yandy Terrell MD.    Specialty:  Family Medicine  Contact information:  [address] [place] [address] [phone]             [facility]    Specialty:  Emergency Medicine  Why:  As needed, If symptoms worsen  Contact information:  [id number] [place] [id number] [phone]                   Medical Decision Making    Medical Decision Makin-year-old female with a past medical history of hypertension presents to the emergency department for complaints of asymptomatic hypertension; she has a blood pressure log that shows elevated blood pressures for the past 3-4 days; average systolic around 175; no systolic blood pressures above 200; she is on multiple blood pressure  medicines currently; she states that she is prescribed clonidine t.i.d. P.r.n, but states that she almost never takes it; she does state that she took 2 this morning prior to coming to the emergency department; she says that she has never taking 2 before; she also states that she was recently taken off of her losartan due to concerns for angioedema; patient was advised to start taking her clonidine more regularly as advised by her physician if her blood pressure remains elevated and to follow up with her primary care physician for outpatient evaluation and long-term management of essential hypertension           Scribe Attestation:   Scribe #1: I performed the above scribed service and the documentation accurately describes the services I performed. I attest to the accuracy of the note.    Attending:   Physician Attestation Statement for Scribe #1: I, Darell Spain MD, personally performed the services described in this documentation, as scribed by Bradford Castle, in my presence, and it is both accurate and complete.          Clinical Impression       ICD-10-CM ICD-9-CM   1. Essential hypertension I10 401.9       Disposition:   Disposition: Discharged  Condition: Stable         Darell Spain MD  01/20/20 [id number]

## 2020-01-20 ENCOUNTER — OFFICE VISIT (OUTPATIENT)
Dept: CARDIOLOGY | Facility: CLINIC | Age: 85
End: 2020-01-20
Payer: MEDICARE

## 2020-01-20 VITALS
OXYGEN SATURATION: 96 % | SYSTOLIC BLOOD PRESSURE: 144 MMHG | HEART RATE: 56 BPM | WEIGHT: 175.94 LBS | BODY MASS INDEX: 30.2 KG/M2 | DIASTOLIC BLOOD PRESSURE: 78 MMHG

## 2020-01-20 DIAGNOSIS — I48.0 PAROXYSMAL ATRIAL FIBRILLATION: ICD-10-CM

## 2020-01-20 DIAGNOSIS — I49.3 PVC'S (PREMATURE VENTRICULAR CONTRACTIONS): ICD-10-CM

## 2020-01-20 DIAGNOSIS — I50.32 CHRONIC DIASTOLIC CONGESTIVE HEART FAILURE: Primary | ICD-10-CM

## 2020-01-20 DIAGNOSIS — I10 ESSENTIAL HYPERTENSION: Chronic | ICD-10-CM

## 2020-01-20 PROCEDURE — 1159F MED LIST DOCD IN RCRD: CPT | Mod: HCNC,S$GLB,, | Performed by: INTERNAL MEDICINE

## 2020-01-20 PROCEDURE — 1101F PT FALLS ASSESS-DOCD LE1/YR: CPT | Mod: HCNC,CPTII,S$GLB, | Performed by: INTERNAL MEDICINE

## 2020-01-20 PROCEDURE — 99999 PR PBB SHADOW E&M-EST. PATIENT-LVL III: ICD-10-PCS | Mod: PBBFAC,HCNC,, | Performed by: INTERNAL MEDICINE

## 2020-01-20 PROCEDURE — 1125F AMNT PAIN NOTED PAIN PRSNT: CPT | Mod: HCNC,S$GLB,, | Performed by: INTERNAL MEDICINE

## 2020-01-20 PROCEDURE — 99214 PR OFFICE/OUTPT VISIT, EST, LEVL IV, 30-39 MIN: ICD-10-PCS | Mod: HCNC,S$GLB,, | Performed by: INTERNAL MEDICINE

## 2020-01-20 PROCEDURE — 1101F PR PT FALLS ASSESS DOC 0-1 FALLS W/OUT INJ PAST YR: ICD-10-PCS | Mod: HCNC,CPTII,S$GLB, | Performed by: INTERNAL MEDICINE

## 2020-01-20 PROCEDURE — 99999 PR PBB SHADOW E&M-EST. PATIENT-LVL III: CPT | Mod: PBBFAC,HCNC,, | Performed by: INTERNAL MEDICINE

## 2020-01-20 PROCEDURE — 99214 OFFICE O/P EST MOD 30 MIN: CPT | Mod: HCNC,S$GLB,, | Performed by: INTERNAL MEDICINE

## 2020-01-20 PROCEDURE — 1125F PR PAIN SEVERITY QUANTIFIED, PAIN PRESENT: ICD-10-PCS | Mod: HCNC,S$GLB,, | Performed by: INTERNAL MEDICINE

## 2020-01-20 PROCEDURE — 1159F PR MEDICATION LIST DOCUMENTED IN MEDICAL RECORD: ICD-10-PCS | Mod: HCNC,S$GLB,, | Performed by: INTERNAL MEDICINE

## 2020-01-20 RX ORDER — METOPROLOL TARTRATE 25 MG/1
25 TABLET, FILM COATED ORAL 2 TIMES DAILY
Qty: 60 TABLET | Refills: 11 | Status: SHIPPED | OUTPATIENT
Start: 2020-01-20 | End: 2020-03-23 | Stop reason: SDUPTHER

## 2020-01-20 RX ORDER — IRBESARTAN 75 MG/1
75 TABLET ORAL 2 TIMES DAILY
Qty: 60 TABLET | Refills: 3 | Status: SHIPPED | OUTPATIENT
Start: 2020-01-20 | End: 2020-01-21 | Stop reason: SDUPTHER

## 2020-01-20 RX ORDER — BUMETANIDE 0.5 MG/1
0.5 TABLET ORAL
Qty: 16 TABLET | Refills: 11 | Status: ON HOLD | OUTPATIENT
Start: 2020-01-20 | End: 2020-02-03 | Stop reason: HOSPADM

## 2020-01-20 NOTE — PATIENT INSTRUCTIONS
Add Irbesartan 75 mg twice a day  Decrease Bumex to 0.5 mg on M, W, F S.  Decrease Metoprolol to 25 mg bid  Taking Clonidine 0.1 mg tid. Ok to have extra clonidine if SBP > 160 mmHg

## 2020-01-20 NOTE — PROGRESS NOTES
Subjective:   Patient ID:  Shirley Metz is a 87 y.o. female who presents for follow up of Shortness of Breath and Dizziness      86 yo female. came in er visit f/uF/u at cardiology service  PMH pafib, + positive SELVIN, HTN, HLD chfpEF and CKD   echo showed EF 60%. LVH   Nulcear stress test no ischemia  10/07 saw pt in the office for C/o recent weakness,  stomach upset,. Has fast pulse. Some dizziness, and Monet. ekg showed new onset afib with RVR. Increased Mwetoprolol to 100 mg bid and add cardizem and eliquis 2.5 m bid.  10/09, admitted to OMR for chest pain. Troponin 0.035 chronic elevation. Afib with RVR  . Echo showed normal EF and mild MR> Next day, STACEY showed + SELVIN thrombus and DCCV cancelled. Continued Eliquis  12/07 repeated STACEY no SELVIN thrombus and DCCV x1 converted to sinus    Pt has had multiple allergies to HTB medication and multiple ER visits for uncontrolled HTN. Yesterday went to  again for /89 mmHG. CXR stable  Pt also feels dehydration and trouble to swallow. Has lightheaded, MONET, weakness. Has constipation.   Her pulse was at 45 to 55 bpm.   Last time to take Bumex was two days ago.  Held Losartan due to lip swelling.            Past Medical History:   Diagnosis Date    Anemia 11/29/2018    Anxiety 11/28/2017    Arthritis     Atrial fibrillation with RVR 10/9/2019    Back pain     Basal cell carcinoma 03/29/2018    left mid back    Chronic diastolic congestive heart failure 10/15/2019    Colon polyps     reported per patient.     Fuchs' corneal dystrophy     General anesthetics causing adverse effect in therapeutic use     woke up during surgery and gagged on ET tube    Glaucoma     Glaucoma     Heart failure with preserved left ventricular function (HFpEF) 7/24/2018    Hyperlipidemia     Hypertension     Macular degeneration dry    Obesity     Squamous cell carcinoma 01/08/2019    left shoulder    Trouble in sleeping     Urinary tract  infection        Past Surgical History:   Procedure Laterality Date    ADENOIDECTOMY  1938    BREAST BIOPSY Left     1997. benign    BREAST CYST EXCISION Left 1997 approx    CARPAL TUNNEL RELEASE Right 2012 approx    CATARACT EXTRACTION Bilateral 1994    CHOLECYSTECTOMY  1975    CORNEAL TRANSPLANT Bilateral 08/2015 8/2015 - left; Right 4/2016    EYE SURGERY      Fuch's Corneal dystrophy - had 2nd operation with Dr. Quintanilla    EYE SURGERY      Cataract wIOL    left thumb Left 2011    removed cuboid for arthritis    REVERSE TOTAL SHOULDER ARTHROPLASTY Left 8/21/2018    Procedure: ARTHROPLASTY, SHOULDER, TOTAL, REVERSE;  Surgeon: Solomon Lujan MD;  Location: [facility] OR;  Service: Orthopedics;  Laterality: Left;  WITH BICEP TENODESIS    SKIN CANCER EXCISION Left 2017    BCC removal by Dr. Valadez    SLT- OS (aka TRABECULOPLASTY) Left 05/11/2011    repeat 3/14/12    TENOTOMY Left 8/21/2018    Procedure: TENOTOMY;  Surgeon: Solomon Lujan MD;  Location: [facility] OR;  Service: Orthopedics;  Laterality: Left;    TONSILLECTOMY  1938    TREATMENT OF CARDIAC ARRHYTHMIA N/A 10/10/2019    Procedure: CARDIOVERSION;  Surgeon: Pete Durán MD;  Location: [facility] CATH LAB;  Service: Cardiology;  Laterality: N/A;    TREATMENT OF CARDIAC ARRHYTHMIA N/A 12/6/2019    Procedure: CARDIOVERSION;  Surgeon: Samy Castillo MD;  Location: [facility] CATH LAB;  Service: Cardiology;  Laterality: N/A;       Social History     Tobacco Use    Smoking status: Never Smoker    Smokeless tobacco: Never Used    Tobacco comment: history of passive smoking from her ex-   Substance Use Topics    Alcohol use: Not Currently     Alcohol/week: 2.0 standard drinks     Types: 2 Standard drinks or equivalent per week     Frequency: Never     Comment: occasional     Drug use: No       Family History   Problem Relation Age of Onset    Heart disease Mother     Hypertension Mother     Cancer Father 88        sarcoma( ?Kaposi's ) on leg     Deep vein thrombosis Neg Hx     Ovarian cancer Neg Hx     Breast cancer Neg Hx     Kidney disease Neg Hx     Strabismus Neg Hx     Retinal detachment Neg Hx     Macular degeneration Neg Hx     Glaucoma Neg Hx     Blindness Neg Hx     Amblyopia Neg Hx     Eczema Neg Hx     Lupus Neg Hx     Melanoma Neg Hx     Psoriasis Neg Hx     Diabetes Neg Hx     Stroke Neg Hx     Mental retardation Neg Hx     Mental illness Neg Hx     Hyperlipidemia Neg Hx     COPD Neg Hx     Asthma Neg Hx     Depression Neg Hx     Alcohol abuse Neg Hx     Drug abuse Neg Hx          Review of Systems   Constitution: Positive for malaise/fatigue and weight loss. Negative for decreased appetite, diaphoresis, fever and night sweats.   HENT: Negative for nosebleeds.    Eyes: Negative for blurred vision and double vision.   Cardiovascular: Positive for palpitations. Negative for chest pain, claudication, irregular heartbeat, leg swelling, near-syncope, orthopnea, paroxysmal nocturnal dyspnea and syncope.   Respiratory: Negative for cough, shortness of breath, sleep disturbances due to breathing, snoring, sputum production and wheezing.    Endocrine: Negative for cold intolerance and polyuria.   Hematologic/Lymphatic: Does not bruise/bleed easily.   Skin: Negative for rash.   Musculoskeletal: Negative for back pain, falls, joint pain, joint swelling and neck pain.   Gastrointestinal: Positive for abdominal pain. Negative for heartburn, nausea and vomiting.   Genitourinary: Negative for dysuria, frequency and hematuria.   Neurological: Negative for difficulty with concentration, dizziness, focal weakness, headaches, light-headedness, numbness, seizures and weakness.   Psychiatric/Behavioral: Negative for depression, memory loss and substance abuse. The patient does not have insomnia.    Allergic/Immunologic: Negative for HIV exposure and hives.       Objective:   Physical Exam   Constitutional: She is oriented to person, place,  and time. She appears well-nourished.   HENT:   Head: Normocephalic.   Eyes: Pupils are equal, round, and reactive to light.   Neck: Normal carotid pulses and no JVD present. Carotid bruit is not present. No thyromegaly present.   Cardiovascular: Normal rate, regular rhythm, normal heart sounds and normal pulses.  No extrasystoles are present. PMI is not displaced. Exam reveals no gallop and no S3.   No murmur heard.  Pulmonary/Chest: Breath sounds normal. No stridor. No respiratory distress.   Abdominal: Soft. Bowel sounds are normal. There is no tenderness. There is no rebound.   Musculoskeletal: Normal range of motion.   Neurological: She is alert and oriented to person, place, and time.   Skin: Skin is intact. No rash noted.   Psychiatric: Her behavior is normal.       Lab Results   Component Value Date    CHOL 233 (H) 08/01/2019    CHOL 180 03/16/2018    CHOL 181 02/19/2018     Lab Results   Component Value Date    HDL 49 08/01/2019    HDL 43 03/16/2018    HDL 45 02/19/2018     Lab Results   Component Value Date    LDLCALC 155.0 08/01/2019    LDLCALC 117.6 03/16/2018    LDLCALC 118.2 02/19/2018     Lab Results   Component Value Date    TRIG 145 08/01/2019    TRIG 97 03/16/2018    TRIG 89 02/19/2018     Lab Results   Component Value Date    CHOLHDL 21.0 08/01/2019    CHOLHDL 23.9 03/16/2018    CHOLHDL 24.9 02/19/2018       Chemistry        Component Value Date/Time     01/08/2020 1342     01/08/2020 1342    K 3.9 01/08/2020 1342    K 3.9 01/08/2020 1342     01/08/2020 1342     01/08/2020 1342    CO2 29 01/08/2020 1342    CO2 29 01/08/2020 1342    BUN 26 (H) 01/08/2020 1342    BUN 26 (H) 01/08/2020 1342    CREATININE 1.3 01/08/2020 1342    CREATININE 1.3 01/08/2020 1342    GLU 94 01/08/2020 1342    GLU 94 01/08/2020 1342        Component Value Date/Time    CALCIUM 9.9 01/08/2020 1342    CALCIUM 9.9 01/08/2020 1342    ALKPHOS 76 10/09/2019 0203    AST 42 (H) 10/09/2019 0203    ALT 48 (H)  10/09/2019 0203    BILITOT 0.5 10/09/2019 0203    ESTGFRAFRICA 42.6 (A) 01/08/2020 1342    ESTGFRAFRICA 42.6 (A) 01/08/2020 1342    EGFRNONAA 37.0 (A) 01/08/2020 1342    EGFRNONAA 37.0 (A) 01/08/2020 1342          Lab Results   Component Value Date    HGBA1C 6.4 (H) 01/08/2020     Lab Results   Component Value Date    TSH 0.849 10/09/2019     Lab Results   Component Value Date    INR 1.0 01/25/2018    INR 1.0 01/01/2017    INR 1.0 02/09/2016     Lab Results   Component Value Date    WBC 8.57 10/11/2019    HGB 10.4 (L) 10/11/2019    HCT 32.6 (L) 10/11/2019    MCV 99 (H) 10/11/2019     10/11/2019     BMP  Sodium   Date Value Ref Range Status   01/08/2020 140 136 - 145 mmol/L Final   01/08/2020 140 136 - 145 mmol/L Final     Potassium   Date Value Ref Range Status   01/08/2020 3.9 3.5 - 5.1 mmol/L Final   01/08/2020 3.9 3.5 - 5.1 mmol/L Final     Chloride   Date Value Ref Range Status   01/08/2020 103 95 - 110 mmol/L Final   01/08/2020 103 95 - 110 mmol/L Final     CO2   Date Value Ref Range Status   01/08/2020 29 23 - 29 mmol/L Final   01/08/2020 29 23 - 29 mmol/L Final     BUN, Bld   Date Value Ref Range Status   01/08/2020 26 (H) 8 - 23 mg/dL Final   01/08/2020 26 (H) 8 - 23 mg/dL Final     Creatinine   Date Value Ref Range Status   01/08/2020 1.3 0.5 - 1.4 mg/dL Final   01/08/2020 1.3 0.5 - 1.4 mg/dL Final     Calcium   Date Value Ref Range Status   01/08/2020 9.9 8.7 - 10.5 mg/dL Final   01/08/2020 9.9 8.7 - 10.5 mg/dL Final     Anion Gap   Date Value Ref Range Status   01/08/2020 8 8 - 16 mmol/L Final   01/08/2020 8 8 - 16 mmol/L Final     eGFR if    Date Value Ref Range Status   01/08/2020 42.6 (A) >60 mL/min/1.73 m^2 Final   01/08/2020 42.6 (A) >60 mL/min/1.73 m^2 Final     eGFR if non    Date Value Ref Range Status   01/08/2020 37.0 (A) >60 mL/min/1.73 m^2 Final     Comment:     Calculation used to obtain the estimated glomerular filtration  rate (eGFR) is the CKD-EPI  equation.      01/08/2020 37.0 (A) >60 mL/min/1.73 m^2 Final     Comment:     Calculation used to obtain the estimated glomerular filtration  rate (eGFR) is the CKD-EPI equation.        BNP  @LABRCNTIP(BNP,BNPTRIAGEBLO)@  @LABRCNTIP(troponini)@  CrCl cannot be calculated (Patient's most recent lab result is older than the maximum 7 days allowed.).  No results found in the last 24 hours.  No results found in the last 24 hours.  No results found in the last 24 hours.    Assessment:      1. Chronic diastolic congestive heart failure    2. Essential hypertension    3. Paroxysmal atrial fibrillation    4. PVC's (premature ventricular contractions)      CHF pEF compensated  BP uncontrolled, consider due to holding Losartan recently, pt states that her lip and gem swelling no change after holdign Losartan. Pt is allergic to multiple HTB medications. And ARB is only one of the medications pt can tolerate.  Plan:   Add Irbesartan 75 mg twice a day  Decrease Bumex to 0.5 mg on M, W, F S. rpn  Decrease Metoprolol to 25 mg bid due to bradycardia  Taking Clonidine 0.1 mg tid. Ok to have extra clonidine if SBP > 160 mmHg  Check BMP and BNP in 2 weeks.  Order ZIO to afib load and bradycardia  Fluid restriction 1.5 liters a day  Na< 2 gm  RTC in 4 to 6 weeks.

## 2020-01-21 DIAGNOSIS — I10 ESSENTIAL HYPERTENSION: Primary | ICD-10-CM

## 2020-01-21 NOTE — TELEPHONE ENCOUNTER
Pt has seen cardiology yesterday and was given multiple new instructions. She is to F/U in 4-6 weeks with cards.

## 2020-01-21 NOTE — TELEPHONE ENCOUNTER
----- Message from Anthony Howard sent at 1/21/2020  1:11 PM CST -----  Contact: pt   ..Type:  RX Refill Request    Who Called:  Pt   Refill or New Rx: new   RX Name and Strength irbesartan (AVAPRO) 75 MG tablet  How is the patient currently taking it? (ex. 1XDay): 2 x day   Is this a 30 day or 90 day RX: pt doesn't know   Preferred Pharmacy with phone number: [facility] or Mail Order: local   Ordering Provider: naomi   Would the patient rather a call back or a response via [facility]? Callback   Best Call Back Number: .[phone]    Additional Information:          .  [facility] - [place] - [address]  [address]  [place] [address]  Phone: [phone] Fax: [phone]

## 2020-01-22 ENCOUNTER — PATIENT MESSAGE (OUTPATIENT)
Dept: CARDIOLOGY | Facility: CLINIC | Age: 85
End: 2020-01-22

## 2020-01-22 ENCOUNTER — OUTPATIENT CASE MANAGEMENT (OUTPATIENT)
Dept: ADMINISTRATIVE | Facility: OTHER | Age: 85
End: 2020-01-22

## 2020-01-22 DIAGNOSIS — I10 ESSENTIAL HYPERTENSION: Primary | ICD-10-CM

## 2020-01-22 RX ORDER — IRBESARTAN 150 MG/1
150 TABLET ORAL 2 TIMES DAILY
COMMUNITY
End: 2020-01-22

## 2020-01-22 RX ORDER — IRBESARTAN 150 MG/1
150 TABLET ORAL DAILY
Qty: 30 TABLET | Refills: 11 | Status: SHIPPED | OUTPATIENT
Start: 2020-01-22 | End: 2020-01-23 | Stop reason: SDUPTHER

## 2020-01-22 RX ORDER — IRBESARTAN 75 MG/1
75 TABLET ORAL 2 TIMES DAILY
Qty: 60 TABLET | Refills: 3 | Status: SHIPPED | OUTPATIENT
Start: 2020-01-22 | End: 2020-01-22

## 2020-01-22 NOTE — TELEPHONE ENCOUNTER
----- Message from Leida Rowe sent at 1/22/2020  7:55 AM CST -----  Type:  Needs Medical Advice    Who Called:  Pt Shirley Rg  Symptoms (please be specific):   Needing a prescription filled(Irdesartin)   How long has patient had these symptoms:     Pharmacy name and phone #:   [facility] on [address]  Would the patient rather a call back or a response via [facility]?   Call back  Best Call Back Number:   [phone]  Additional Information:  Pt states Dr Castillo had prescribed a medication on Monday/1-20-20 and she is having a problem filling it//she was told  needs to call the manufacturor//please call to discuss//ernesto/laura

## 2020-01-22 NOTE — PROGRESS NOTES
Outpatient Care Management  Plan of Care Follow Up Visit    Patient: Shirley Metz  MRN: [id number]  Date of Service: 01/22/2020  Completed by: Shirley Ryan RN  Referral Date: 01/08/2020  Program: Case Management (High Risk)    Reason for Visit   Patient presents with    Update Plan Of Care       Brief Summary: Phone contact with pt per her phoned request this AM for contact one day earlier than this CM's planned follow up. She advised she has seen her cardio provider recently and had irbisartan 75 mg ordered BID, but [employer] will not approve for pharmacy to dispense 60 pills monthly and are requesting that provider change order to 150 mg, 1/2 tab BID. She has messaged cardio provider on [facility] and called and left messages with his staff requesting new corrected script, but it has not been called or sent to pharmacy yet. She advised she has rec'd mailed literature sent by this CM and reviewed most, but wanted [employer] benefits informational brochures that this CM failed to include in mail out. Advised this CM will send OTC booklet, transportation number and Well Dine brochures, along with [employer] number. CM will also message Dr Castillo's staff about script correction needed and will follow up with her in two weeks to see if she has rec'd new mail out. She voiced understanding and agreement with this plan.     Patient Summary     Involvement of Care:  Do I have permission to speak with other family members about your care?   Yes, son     Patient Reported Labs & Vitals:  1.  Any Patient Reported Labs & Vitals?   No  2.  Patient Reported Blood Pressure:     3.  Patient Reported Pulse:     4.  Patient Reported Weight (Kg):     5.  Patient Reported Blood Glucose (mg/dl):       Medical History:  Reviewed medical history with patient and/or caregiver    Social History:  Reviewed social history with patient and/or caregiver    Clinical Assessment     Reviewed and provided basic information on available  community resources for mental health, transportation, wellness resources, and palliative care programs with patient and/or caregiver.    Complex Care Plan     Care plan was discussed and completed today with input from patient and/or caregiver.    Goals      Patient/caregiver will have an action plan in place to manage and prevent complications of a fib prior to discharge from OPCM. - Priority: Low      Overall Time to Completion  2 months from 01/09/2020     OPCM Identified Patient Needs:  Advanced Care Planning:  No  Nutrition:  no  Housing:  no  Medication Adherence:  No  Lab Adherence:  no  Cognitive/Behavioral Health:  no  Communication:  no  Health Literacy:  no  Equipment/Supplies/Services:  no  Culural/Oriental orthodox Beliefs:  no  PHQ:  No       OPCM Identified Disease Education Needs:  Hypertension:  Pos - Care Plan Created  Chronic Kidney Disease:  Neg         Short Term Goals  Patient/caregiver will verbalize 2 signs and symptoms of A-Fib within 1 month.   Interventions   · Recognize and provide educational material (ANTONIO).     Status  · Partially met      Patient/caregiver will verbalize 2 ways of preventing complications due to disease process within 1 month.  Interventions   · Encourage compliance with Physician follow-ups.  · Encourage Dietary Compliance.  · Encourage Exercise.  · Encourage Medication Compliance.  · Recognize and provide educational material (ANTONIO).     Status  · Partially met    Patient/Caregiver agrees to receive educational literature about a fib by 1/23/20.  Patient/Caregiver agrees to OPCM follow up within 2 weeks to assess progress to goal.          Clinical Reference Documents Added to Patient Instructions       Document    ATRIAL FIBRILLATION, UNDERSTANDING (ENGLISH)    BALANCING CALCIUM AND PHOSPHORUS, KIDNEY DISEASE (ENGLISH)    CHOOSING THE RIGHT PROTEIN FOR YOUR BODY, KIDNEY DISEASE (ENGLISH)    CHRONIC KIDNEY DISEASE, DIET FOR (ENGLISH)    HEART-HEALTHY FOOD, EATING:  USING THE DASH PLAN (ENGLISH)             Patient/caregiver will have an action plan in place to manage and prevent complications of CKD prior to discharge from OPCM. - Priority: Med      Overall Time to Completion  2 months from 01/09/2020     OPCM Identified Patient Needs:  Advanced Care Planning:  No  Nutrition:  no  Housing:  no  Medication Adherence:  No  Lab Adherence:  no  Cognitive/Behavioral Health:  no  Communication:  no  Health Literacy:  no  Equipment/Supplies/Services:  no  Culural/Protestant Beliefs:  no  PHQ:  No       OPCM Identified Disease Education Needs:  Hypertension:  Pos - Care Plan Created  Chronic Kidney Disease:  Neg          Short Term Goals  Patient/caregiver will verbalize 2 signs and symptoms of Kidney Disease within 1 month.   Interventions   · Recognize and provide educational material (KRAMES).     Status  · Partially met      Patient/caregiver will verbalize 2 ways of preventing complications due to disease process within 1 month.  Interventions   · Encourage compliance with Physician follow-ups.  · Encourage Dietary Compliance.  · Encourage Exercise.  · Encourage Medication Compliance.  · Recognize and provide educational material (KRAMES).     Status  · Partially met    Patient/Caregiver agrees to receive educational literature related to CKD by 1/23/20.  Patient/Caregiver agrees to OPCM follow up within 2 weeks to assess progress to goal.            Clinical Reference Documents Added to Patient Instructions       Document    BALANCING CALCIUM AND PHOSPHORUS, KIDNEY DISEASE (ENGLISH)    CHOOSING THE RIGHT PROTEIN FOR YOUR BODY, KIDNEY DISEASE (ENGLISH)    CHRONIC KIDNEY DISEASE, DIET FOR (ENGLISH)    HEART-HEALTHY FOOD, EATING: USING THE DASH PLAN (ENGLISH)             Patient/caregiver will have knowledge of resources available in order to obtain the services that are needed prior to discharge from OPCM. - Priority: Low      Overall Time to Completion  2 months from  01/22/2020     OPCM Identified Patient Needs:     OPCM Identified Disease Education Needs:       Short Term Goals  Patient/caregiver will have improved understanding of support resources within 1 month.  Interventions   · Mail [employer] OTC Benefit Contact Information.  · Mail [employer] Transportation Contact Information.  · Refer to Community Resource: Mail [employer] Well [employer]ine Brochure.     Status  · Partially met - 1/22/20 - Mailed today.       Patient/Caregiver agrees to receipt of [employer] program brochures by 2/5/20.  Patient/Caregiver agrees to OPCM follow up within 2 weeks to assess progress to goal.           Clinical Reference Documents Added to Patient Instructions       Document    BALANCING CALCIUM AND PHOSPHORUS, KIDNEY DISEASE (ENGLISH)    CHOOSING THE RIGHT PROTEIN FOR YOUR BODY, KIDNEY DISEASE (ENGLISH)    CHRONIC KIDNEY DISEASE, DIET FOR (ENGLISH)    HEART-HEALTHY FOOD, EATING: USING THE DASH PLAN (ENGLISH)                      Patient Instructions     Instructions were provided via the [facility] patient resources and are available for the patient to view on the patient portal.    Next Steps: Follow up in two weeks to see if she has rec'd mailed literature and has any questions. Did irbisartan script get corrected? How is blood pressure? Is she following recommended daily fluid restriction? Shirley Ryan RN    No follow-ups on file.      [facility] was developed with the patients/caregivers input and was based on identified barriers from todays assessment.  Goals were written today with the patient/caregiver and the patient has agreed to work towards these goals to improve his/her overall well-being. Patient verbalized understanding of the care plan, goals, and all of today's instructions. Encouraged patient/caregiver to communicate with his/her physician and health care team about health conditions and the treatment plan.  Provided my contact information today and encouraged  patient/caregiver to call me with any questions as needed.

## 2020-01-22 NOTE — TELEPHONE ENCOUNTER
----- Message from Shirley Ryan RN sent at 1/22/2020  3:52 PM CST -----  Contact: Shirley Castillo/Staff,    I am working with Mrs Bettencourt in Outpatient Case Management and she called me today asking that I message you about her irbesartan script. Apparently, [employer] will not approve dispensing 60 tabs a month of 75 mg strength and are requesting that you change the script to 150 mg, 1/2 tab BID. I told her I would also message you, altho I see that she has messaged your staff and left voice mails. Thank you.     Kindest Regards,     Shirley Ryan RN, [facility]  Outpatient Case Management  [phone]  Ext [phone]  [email or web address]

## 2020-01-22 NOTE — TELEPHONE ENCOUNTER
I am working with Mrs Bettencourt in Outpatient Case Management and she called me today asking that I message you about her irbesartan script. Apparently, [employer] will not approve dispensing 60 tabs a month of 75 mg strength and are requesting that you change the script to 150 mg, 1/2 tab BID. I told her I would also message you, altho I see that she has messaged your staff and left voice mails. Thank you.     Patient Rx resubmitted in her chart to change the dosage for the irbesartan script 150 mg. The Rx will be sent to Dr. Ramirez as he is covering Dr. Castillo's box while he is out the office. Patient will be notified of the Rx changes.

## 2020-01-23 DIAGNOSIS — I10 ESSENTIAL HYPERTENSION: ICD-10-CM

## 2020-01-23 RX ORDER — CLONIDINE HYDROCHLORIDE 0.1 MG/1
0.1 TABLET ORAL 3 TIMES DAILY
Qty: 180 TABLET | Refills: 3 | Status: CANCELLED | OUTPATIENT
Start: 2020-01-23 | End: 2021-01-22

## 2020-01-23 NOTE — TELEPHONE ENCOUNTER
The pharmacist called and the patient's insurance will pay for Avapro 150 mg 1/2 a tablet once a day, but not 150 mg 1 tablet once a day.

## 2020-01-24 ENCOUNTER — OFFICE VISIT (OUTPATIENT)
Dept: INTERNAL MEDICINE | Facility: CLINIC | Age: 85
End: 2020-01-24
Payer: MEDICARE

## 2020-01-24 ENCOUNTER — TELEPHONE (OUTPATIENT)
Dept: CARDIOLOGY | Facility: CLINIC | Age: 85
End: 2020-01-24

## 2020-01-24 ENCOUNTER — CLINICAL SUPPORT (OUTPATIENT)
Dept: CARDIOLOGY | Facility: CLINIC | Age: 85
End: 2020-01-24
Payer: MEDICARE

## 2020-01-24 ENCOUNTER — NURSE TRIAGE (OUTPATIENT)
Dept: ADMINISTRATIVE | Facility: CLINIC | Age: 85
End: 2020-01-24

## 2020-01-24 VITALS
TEMPERATURE: 99 F | SYSTOLIC BLOOD PRESSURE: 142 MMHG | BODY MASS INDEX: 29.47 KG/M2 | WEIGHT: 172.63 LBS | HEIGHT: 64 IN | DIASTOLIC BLOOD PRESSURE: 80 MMHG | OXYGEN SATURATION: 97 % | HEART RATE: 54 BPM

## 2020-01-24 DIAGNOSIS — I48.91 NEW ONSET A-FIB: ICD-10-CM

## 2020-01-24 DIAGNOSIS — I50.32 CHRONIC DIASTOLIC CONGESTIVE HEART FAILURE: ICD-10-CM

## 2020-01-24 DIAGNOSIS — I48.91 NEW ONSET A-FIB: Primary | ICD-10-CM

## 2020-01-24 DIAGNOSIS — I16.0 HYPERTENSIVE URGENCY: ICD-10-CM

## 2020-01-24 PROCEDURE — 99214 OFFICE O/P EST MOD 30 MIN: CPT | Mod: HCNC,S$GLB,, | Performed by: FAMILY MEDICINE

## 2020-01-24 PROCEDURE — 1125F PR PAIN SEVERITY QUANTIFIED, PAIN PRESENT: ICD-10-PCS | Mod: HCNC,S$GLB,, | Performed by: FAMILY MEDICINE

## 2020-01-24 PROCEDURE — 99999 PR PBB SHADOW E&M-EST. PATIENT-LVL III: ICD-10-PCS | Mod: PBBFAC,HCNC,, | Performed by: FAMILY MEDICINE

## 2020-01-24 PROCEDURE — 99214 PR OFFICE/OUTPT VISIT, EST, LEVL IV, 30-39 MIN: ICD-10-PCS | Mod: HCNC,S$GLB,, | Performed by: FAMILY MEDICINE

## 2020-01-24 PROCEDURE — 1101F PT FALLS ASSESS-DOCD LE1/YR: CPT | Mod: HCNC,CPTII,S$GLB, | Performed by: FAMILY MEDICINE

## 2020-01-24 PROCEDURE — 1159F MED LIST DOCD IN RCRD: CPT | Mod: HCNC,S$GLB,, | Performed by: FAMILY MEDICINE

## 2020-01-24 PROCEDURE — 1125F AMNT PAIN NOTED PAIN PRSNT: CPT | Mod: HCNC,S$GLB,, | Performed by: FAMILY MEDICINE

## 2020-01-24 PROCEDURE — 99999 PR PBB SHADOW E&M-EST. PATIENT-LVL III: CPT | Mod: PBBFAC,HCNC,, | Performed by: FAMILY MEDICINE

## 2020-01-24 PROCEDURE — 1101F PR PT FALLS ASSESS DOC 0-1 FALLS W/OUT INJ PAST YR: ICD-10-PCS | Mod: HCNC,CPTII,S$GLB, | Performed by: FAMILY MEDICINE

## 2020-01-24 PROCEDURE — 93010 ELECTROCARDIOGRAM REPORT: CPT | Mod: HCNC,S$GLB,, | Performed by: INTERNAL MEDICINE

## 2020-01-24 PROCEDURE — 1159F PR MEDICATION LIST DOCUMENTED IN MEDICAL RECORD: ICD-10-PCS | Mod: HCNC,S$GLB,, | Performed by: FAMILY MEDICINE

## 2020-01-24 PROCEDURE — 93010 EKG 12-LEAD: ICD-10-PCS | Mod: HCNC,S$GLB,, | Performed by: INTERNAL MEDICINE

## 2020-01-24 PROCEDURE — 93005 ELECTROCARDIOGRAM TRACING: CPT | Mod: HCNC,S$GLB,, | Performed by: FAMILY MEDICINE

## 2020-01-24 PROCEDURE — 93005 EKG 12-LEAD: ICD-10-PCS | Mod: HCNC,S$GLB,, | Performed by: FAMILY MEDICINE

## 2020-01-24 RX ORDER — CLONIDINE HYDROCHLORIDE 0.1 MG/1
0.1 TABLET ORAL 3 TIMES DAILY
Qty: 90 TABLET | Refills: 0 | Status: SHIPPED | OUTPATIENT
Start: 2020-01-24 | End: 2020-01-30 | Stop reason: SDUPTHER

## 2020-01-24 NOTE — PROGRESS NOTES
Subjective:       Patient ID: Shirley Metz is a 87 y.o. female.    Chief Complaint: No chief complaint on file.    Pt is a 87 year old with increase BP that is not being controlled.     Review of Systems    Objective:      Physical Exam    Assessment:       No diagnosis found.    Plan:       ***

## 2020-01-24 NOTE — TELEPHONE ENCOUNTER
Reason for Disposition   [1] Systolic BP  >= 160 OR Diastolic >= 100 AND [2] cardiac or neurologic symptoms (e.g., chest pain, difficulty breathing, unsteady gait, blurred vision)    Additional Information   Negative: Difficult to awaken or acting confused (e.g., disoriented, slurred speech)   Negative: Severe difficulty breathing (e.g., struggling for each breath, speaks in single words)   Negative: [1] Numbness (i.e., loss of sensation) of the face, arm or leg on one side of the body AND [2] new onset   Negative: [1] Weakness of the face, arm or leg on one side of the body AND [2] new onset   Negative: [1] Chest pain lasts > 5 minutes AND [2] history of heart disease  (i.e., heart attack, bypass surgery, angina, angioplasty, CHF)   Negative: [1] Chest pain AND [2] took nitrogylcerin AND [3] pain was not relieved   Negative: Sounds like a life-threatening emergency to the triager    Protocols used: HIGH BLOOD PRESSURE-A-AH    Patient states she is shaky and mild shortness of breath with exertion. She states that she was told by Dr. Castillo she could take up to 5 clonidine in 24 hour period. Patient informed that the limit of clonidine in 24 hours is not written on her record, but she could call a pharmacist to inquire about that. Also, she just took 1 dose of irbesartan because she picked it up on 1/23 although it was ordered on 1/20/2020. But for right now, she is not responding to the clonidine and I would have recommend she got to the ED for evaluation of bp and shakiness. She wants to take another clonidine and wait and see what happens because the ED didn't do anything the other day. Unsure what patient will do.

## 2020-01-24 NOTE — PROGRESS NOTES
Subjective:       Patient ID: Shirley Metz is a 87 y.o. female.    Chief Complaint: Blood Pressure Check    Pt BP is per her home records up and down to high 180's. Pt has Afib but recently cardio-converted and is no in sinus ed. She reports no CP or SOB when BP raises. Medication have been changed in the last 4-6 months.    Review of Systems   Constitutional: Negative.    Respiratory: Negative.    Cardiovascular: Negative.    Genitourinary: Negative.    Neurological: Negative.        Objective:      Physical Exam   Constitutional: She appears well-developed and well-nourished.   Cardiovascular: Normal rate and regular rhythm. Exam reveals no friction rub.   No murmur heard.  Pulmonary/Chest: Effort normal and breath sounds normal.   Abdominal: Soft. Bowel sounds are normal.   Skin: Skin is warm and dry.   Psychiatric: She has a normal mood and affect.       Assessment:       1. New onset a-fib    2. Hypertensive urgency    3. Chronic diastolic congestive heart failure        Plan:       New onset a-fib  Comments:  EKG is Bradycardia with NSR.  Orders:  -     SCHEDULED EKG 12-LEAD (to [place]); Future    Hypertensive urgency  Comments:  Will continue to use the clonidicine .1 mg but have her take 2 if needed    Chronic diastolic congestive heart failure  Comments:  Seems to be stable. Continue to follow-up with Cardiology    Other orders  -     cloNIDine (CATAPRES) 0.1 MG tablet; Take 1 tablet (0.1 mg total) by mouth 3 (three) times daily.  Dispense: 90 tablet; Refill: 0

## 2020-01-24 NOTE — TELEPHONE ENCOUNTER
Patient is experiencing elevated blood pressure and would like to be seen today. Patient states has an appointment with Dr. Bryson today at the [place] location about her elevated blood pressure.

## 2020-01-26 ENCOUNTER — NURSE TRIAGE (OUTPATIENT)
Dept: ADMINISTRATIVE | Facility: CLINIC | Age: 85
End: 2020-01-26

## 2020-01-27 RX ORDER — IRBESARTAN 150 MG/1
150 TABLET ORAL DAILY
Qty: 30 TABLET | Refills: 11 | Status: SHIPPED | OUTPATIENT
Start: 2020-01-27 | End: 2020-01-28

## 2020-01-27 NOTE — TELEPHONE ENCOUNTER
Reason for Disposition   Systolic BP  >= 180 OR Diastolic >= 110    Additional Information   Negative: Difficult to awaken or acting confused (e.g., disoriented, slurred speech)   Negative: Severe difficulty breathing (e.g., struggling for each breath, speaks in single words)   Negative: [1] Weakness of the face, arm or leg on one side of the body AND [2] new onset   Negative: [1] Numbness (i.e., loss of sensation) of the face, arm or leg on one side of the body AND [2] new onset   Negative: [1] Chest pain lasts > 5 minutes AND [2] history of heart disease  (i.e., heart attack, bypass surgery, angina, angioplasty, CHF)   Negative: [1] Chest pain AND [2] took nitrogylcerin AND [3] pain was not relieved   Negative: Sounds like a life-threatening emergency to the triager   Negative: [1] Systolic BP  >= 160 OR Diastolic >= 100 AND [2] cardiac or neurologic symptoms (e.g., chest pain, difficulty breathing, unsteady gait, blurred vision)   Negative: [1] Systolic BP  >= 200 OR Diastolic >= 120  AND [2] having NO cardiac or neurologic symptoms   Negative: [1] Postpartum < 6 weeks AND [2] BP Systolic BP  >= 140 OR Diastolic >= 90   Negative: [1] Systolic BP  >= 180 OR Diastolic >= 110 AND [2] missed most recent dose of blood pressure medication    Protocols used: HIGH BLOOD PRESSURE-A-    Patient called in reference to her bp going up and down. She denies symptoms. Her bp is 198/98 HR 58. She states she took clonidine 945p. Patient advised to call back if she begins to experience symptoms, but if no symptoms, patient still needs to see her card tomorrow and she should call the clinic. She verbalized understanding of care advice.

## 2020-01-28 ENCOUNTER — OFFICE VISIT (OUTPATIENT)
Dept: INTERNAL MEDICINE | Facility: CLINIC | Age: 85
End: 2020-01-28
Payer: MEDICARE

## 2020-01-28 ENCOUNTER — TELEPHONE (OUTPATIENT)
Dept: INTERNAL MEDICINE | Facility: CLINIC | Age: 85
End: 2020-01-28

## 2020-01-28 VITALS
HEART RATE: 49 BPM | SYSTOLIC BLOOD PRESSURE: 195 MMHG | DIASTOLIC BLOOD PRESSURE: 9 MMHG | HEIGHT: 64 IN | BODY MASS INDEX: 29.32 KG/M2 | TEMPERATURE: 99 F | WEIGHT: 171.75 LBS

## 2020-01-28 DIAGNOSIS — N30.00 ACUTE CYSTITIS WITHOUT HEMATURIA: Primary | ICD-10-CM

## 2020-01-28 DIAGNOSIS — I48.91 NEW ONSET A-FIB: ICD-10-CM

## 2020-01-28 DIAGNOSIS — I10 ESSENTIAL HYPERTENSION: Chronic | ICD-10-CM

## 2020-01-28 PROCEDURE — 1126F PR PAIN SEVERITY QUANTIFIED, NO PAIN PRESENT: ICD-10-PCS | Mod: HCNC,S$GLB,, | Performed by: FAMILY MEDICINE

## 2020-01-28 PROCEDURE — 1126F AMNT PAIN NOTED NONE PRSNT: CPT | Mod: HCNC,S$GLB,, | Performed by: FAMILY MEDICINE

## 2020-01-28 PROCEDURE — 1101F PR PT FALLS ASSESS DOC 0-1 FALLS W/OUT INJ PAST YR: ICD-10-PCS | Mod: HCNC,CPTII,S$GLB, | Performed by: FAMILY MEDICINE

## 2020-01-28 PROCEDURE — 1159F PR MEDICATION LIST DOCUMENTED IN MEDICAL RECORD: ICD-10-PCS | Mod: HCNC,S$GLB,, | Performed by: FAMILY MEDICINE

## 2020-01-28 PROCEDURE — 1101F PT FALLS ASSESS-DOCD LE1/YR: CPT | Mod: HCNC,CPTII,S$GLB, | Performed by: FAMILY MEDICINE

## 2020-01-28 PROCEDURE — 1159F MED LIST DOCD IN RCRD: CPT | Mod: HCNC,S$GLB,, | Performed by: FAMILY MEDICINE

## 2020-01-28 PROCEDURE — 99214 OFFICE O/P EST MOD 30 MIN: CPT | Mod: HCNC,S$GLB,, | Performed by: FAMILY MEDICINE

## 2020-01-28 PROCEDURE — 99214 PR OFFICE/OUTPT VISIT, EST, LEVL IV, 30-39 MIN: ICD-10-PCS | Mod: HCNC,S$GLB,, | Performed by: FAMILY MEDICINE

## 2020-01-28 PROCEDURE — 99999 PR PBB SHADOW E&M-EST. PATIENT-LVL III: ICD-10-PCS | Mod: PBBFAC,HCNC,, | Performed by: FAMILY MEDICINE

## 2020-01-28 PROCEDURE — 99999 PR PBB SHADOW E&M-EST. PATIENT-LVL III: CPT | Mod: PBBFAC,HCNC,, | Performed by: FAMILY MEDICINE

## 2020-01-28 RX ORDER — LOSARTAN POTASSIUM 50 MG/1
100 TABLET ORAL DAILY
Qty: 180 TABLET | Refills: 0 | Status: SHIPPED | OUTPATIENT
Start: 2020-01-28 | End: 2020-05-07

## 2020-01-28 NOTE — TELEPHONE ENCOUNTER
I returned pt's call in regards to lab/UA results, I informed her that we were still waiting on Urine Culture to come back and will contact her once results are resulted by Dr. Bryson. Pt thanked me for returning call and ended call. //BJ

## 2020-01-28 NOTE — TELEPHONE ENCOUNTER
----- Message from Analilia Howard sent at 1/28/2020  3:57 PM CST -----  Contact: patient  Type:  Test Results    Who Called: patient  Name of Test (Lab/Mammo/Etc): lab/U/A  Date of Test: TODAY  Ordering Provider: VALENTE  Where the test was performed: [facility]  Would the patient rather a call back or a response via [facility]? CALL  Best Call Back Number: [phone]  Additional Information:  PLEASE RETURN CALL

## 2020-01-28 NOTE — PROGRESS NOTES
Subjective:       Patient ID: Shirley Metz is a 87 y.o. female.    Chief Complaint: Hypertension and Dehydration    Pt is a 87 year old who was seen last week for elevated BP. Pt had some recent medication changes and to the best of her records BP became erratic when changed from losartan to avapro. Pt certainly has had better pressures since the switch. She recently was diagnosed with Afib and seems to have cardio converted. She is complaining of Shortness of breath    Hypertension   This is a recurrent problem. The current episode started more than 1 year ago. The problem has been waxing and waning since onset. The problem is uncontrolled. Associated symptoms include anxiety and shortness of breath. There are no associated agents to hypertension. Risk factors for coronary artery disease include obesity and stress. Past treatments include ACE inhibitors, beta blockers, calcium channel blockers and diuretics. The current treatment provides moderate improvement. Compliance problems include medication side effects.      Review of Systems   Constitutional: Negative.    Respiratory: Positive for shortness of breath.    Cardiovascular: Negative.    Genitourinary: Negative.    Hematological: Negative.        Objective:      Physical Exam   Constitutional: She appears well-developed and well-nourished.   Cardiovascular: Normal rate and regular rhythm.   Pulmonary/Chest: Effort normal and breath sounds normal. No stridor. She has no wheezes.   Abdominal: Soft. Bowel sounds are normal.   Neurological: She displays normal reflexes. Coordination normal.       Assessment:       1. Acute cystitis without hematuria    2. Essential hypertension    3. New onset a-fib        Plan:       Acute cystitis without hematuria  Comments:  Will do UA just to see is infection causing disruption  Orders:  -     Urinalysis; Future; Expected date: 01/28/2020  -     Urine culture; Future; Expected date: 01/28/2020    Essential  hypertension  Comments:  Switched pt to losartan. Not sure why this would make a difference as both are [facility]. Follow up with Cards in 2 days. Stay on clonidine as back up    New onset a-fib  Comments:  Sinus ed    Other orders  -     losartan (COZAAR) 50 MG tablet; Take 2 tablets (100 mg total) by mouth once daily.  Dispense: 180 tablet; Refill: 0

## 2020-01-30 ENCOUNTER — PATIENT MESSAGE (OUTPATIENT)
Dept: INTERNAL MEDICINE | Facility: CLINIC | Age: 85
End: 2020-01-30

## 2020-01-30 ENCOUNTER — TELEPHONE (OUTPATIENT)
Dept: CARDIOLOGY | Facility: CLINIC | Age: 85
End: 2020-01-30

## 2020-01-30 ENCOUNTER — OFFICE VISIT (OUTPATIENT)
Dept: CARDIOLOGY | Facility: CLINIC | Age: 85
End: 2020-01-30
Payer: MEDICARE

## 2020-01-30 VITALS
BODY MASS INDEX: 29.93 KG/M2 | WEIGHT: 174.38 LBS | DIASTOLIC BLOOD PRESSURE: 88 MMHG | HEART RATE: 59 BPM | OXYGEN SATURATION: 96 % | SYSTOLIC BLOOD PRESSURE: 178 MMHG

## 2020-01-30 DIAGNOSIS — R09.89 UNEQUAL BLOOD PRESSURES IN PAIRED EXTREMITIES: ICD-10-CM

## 2020-01-30 DIAGNOSIS — I10 ESSENTIAL HYPERTENSION: Primary | Chronic | ICD-10-CM

## 2020-01-30 DIAGNOSIS — I50.32 CHRONIC DIASTOLIC CONGESTIVE HEART FAILURE: ICD-10-CM

## 2020-01-30 DIAGNOSIS — I48.91 NEW ONSET A-FIB: ICD-10-CM

## 2020-01-30 PROCEDURE — 1101F PR PT FALLS ASSESS DOC 0-1 FALLS W/OUT INJ PAST YR: ICD-10-PCS | Mod: HCNC,CPTII,S$GLB, | Performed by: INTERNAL MEDICINE

## 2020-01-30 PROCEDURE — 99999 PR PBB SHADOW E&M-EST. PATIENT-LVL III: ICD-10-PCS | Mod: PBBFAC,HCNC,, | Performed by: INTERNAL MEDICINE

## 2020-01-30 PROCEDURE — 1126F AMNT PAIN NOTED NONE PRSNT: CPT | Mod: HCNC,S$GLB,, | Performed by: INTERNAL MEDICINE

## 2020-01-30 PROCEDURE — 1101F PT FALLS ASSESS-DOCD LE1/YR: CPT | Mod: HCNC,CPTII,S$GLB, | Performed by: INTERNAL MEDICINE

## 2020-01-30 PROCEDURE — 1126F PR PAIN SEVERITY QUANTIFIED, NO PAIN PRESENT: ICD-10-PCS | Mod: HCNC,S$GLB,, | Performed by: INTERNAL MEDICINE

## 2020-01-30 PROCEDURE — 1159F MED LIST DOCD IN RCRD: CPT | Mod: HCNC,S$GLB,, | Performed by: INTERNAL MEDICINE

## 2020-01-30 PROCEDURE — 1159F PR MEDICATION LIST DOCUMENTED IN MEDICAL RECORD: ICD-10-PCS | Mod: HCNC,S$GLB,, | Performed by: INTERNAL MEDICINE

## 2020-01-30 PROCEDURE — 99999 PR PBB SHADOW E&M-EST. PATIENT-LVL III: CPT | Mod: PBBFAC,HCNC,, | Performed by: INTERNAL MEDICINE

## 2020-01-30 PROCEDURE — 99214 OFFICE O/P EST MOD 30 MIN: CPT | Mod: HCNC,S$GLB,, | Performed by: INTERNAL MEDICINE

## 2020-01-30 PROCEDURE — 99214 PR OFFICE/OUTPT VISIT, EST, LEVL IV, 30-39 MIN: ICD-10-PCS | Mod: HCNC,S$GLB,, | Performed by: INTERNAL MEDICINE

## 2020-01-30 RX ORDER — DOXAZOSIN 4 MG/1
4 TABLET ORAL NIGHTLY
Qty: 30 TABLET | Refills: 11 | Status: SHIPPED | OUTPATIENT
Start: 2020-01-30 | End: 2020-01-30 | Stop reason: SDUPTHER

## 2020-01-30 RX ORDER — CLONIDINE HYDROCHLORIDE 0.1 MG/1
0.2 TABLET ORAL 3 TIMES DAILY
Qty: 180 TABLET | Refills: 5 | Status: ON HOLD | OUTPATIENT
Start: 2020-01-30 | End: 2020-02-03 | Stop reason: SDUPTHER

## 2020-01-30 RX ORDER — DOXAZOSIN 4 MG/1
4 TABLET ORAL NIGHTLY
Qty: 30 TABLET | Refills: 11 | Status: SHIPPED | OUTPATIENT
Start: 2020-01-30 | End: 2020-02-15

## 2020-01-30 NOTE — PROGRESS NOTES
Subjective:   Patient ID:  Shirley Metz is a 87 y.o. female who presents for follow up of No chief complaint on file.      86 yo female. came in er visit f/uF/u at cardiology service  [facility] pafib, + positive SELVIN, HTN, HLD chfpEF and CKD   echo showed EF 60%. LVH   Nulcear stress test no ischemia  10/07 saw pt in the office for C/o recent weakness,  stomach upset,. Has fast pulse. Some dizziness, and Monet. ekg showed new onset afib with RVR. Increased Mwetoprolol to 100 mg bid and add cardizem and eliquis 2.5 m bid.  10/09, admitted to [facility] for chest pain. Troponin 0.035 chronic elevation. Afib with RVR  . Echo showed normal EF and mild MR> Next day, STACEY showed + SELVIN thrombus and DCCV cancelled. Continued Eliquis  12/07 repeated STACEY no SELVIN thrombus and DCCV x1 converted to sinus    Pt has had multiple allergies to HTN medication and multiple ER visits for uncontrolled HTN.   Pt also feels dehydration and trouble to swallow. Has lightheaded, MONET, weakness. Has constipation.   Her pulse was at 45 to 55 bpm.   Had headache and blurred vision  BP at home 160 to 200 mmHG  Today states that ok to continue Losartan 100 mg daily  Daily taking clonidine 1 mg daily  Continue Metoprolol 25 mg bid              Past Medical History:   Diagnosis Date    Anemia 11/29/2018    Anxiety 11/28/2017    Arthritis     Atrial fibrillation with RVR 10/9/2019    Back pain     Basal cell carcinoma 03/29/2018    left mid back    Chronic diastolic congestive heart failure 10/15/2019    Colon polyps     reported per patient.     Fuchs' corneal dystrophy     General anesthetics causing adverse effect in therapeutic use     woke up during surgery and gagged on ET tube    Glaucoma     Glaucoma     Heart failure with preserved left ventricular function (HFpEF) 7/24/2018    Hyperlipidemia     Hypertension     Macular degeneration dry    Obesity     Squamous cell carcinoma 01/08/2019    left shoulder     Trouble in sleeping     Urinary tract infection        Past Surgical History:   Procedure Laterality Date    ADENOIDECTOMY  1938    BREAST BIOPSY Left     1997. benign    BREAST CYST EXCISION Left 1997 approx    CARPAL TUNNEL RELEASE Right 2012 approx    CATARACT EXTRACTION Bilateral 1994    CHOLECYSTECTOMY  1975    CORNEAL TRANSPLANT Bilateral 08/2015 8/2015 - left; Right 4/2016    EYE SURGERY      Fuch's Corneal dystrophy - had 2nd operation with Dr. Quintanilla    EYE SURGERY      Cataract wIOL    left thumb Left 2011    removed cuboid for arthritis    REVERSE TOTAL SHOULDER ARTHROPLASTY Left 8/21/2018    Procedure: ARTHROPLASTY, SHOULDER, TOTAL, REVERSE;  Surgeon: Solomon Lujan MD;  Location: [facility] OR;  Service: Orthopedics;  Laterality: Left;  WITH BICEP TENODESIS    SKIN CANCER EXCISION Left 2017    BCC removal by Dr. Valadez    SLT- OS (aka TRABECULOPLASTY) Left 05/11/2011    repeat 3/14/12    TENOTOMY Left 8/21/2018    Procedure: TENOTOMY;  Surgeon: Solomon Lujan MD;  Location: [facility] OR;  Service: Orthopedics;  Laterality: Left;    TONSILLECTOMY  1938    TREATMENT OF CARDIAC ARRHYTHMIA N/A 10/10/2019    Procedure: CARDIOVERSION;  Surgeon: Pete Durán MD;  Location: [facility] CATH LAB;  Service: Cardiology;  Laterality: N/A;    TREATMENT OF CARDIAC ARRHYTHMIA N/A 12/6/2019    Procedure: CARDIOVERSION;  Surgeon: Samy Castillo MD;  Location: [facility] CATH LAB;  Service: Cardiology;  Laterality: N/A;       Social History     Tobacco Use    Smoking status: Never Smoker    Smokeless tobacco: Never Used    Tobacco comment: history of passive smoking from her ex-   Substance Use Topics    Alcohol use: Not Currently     Alcohol/week: 2.0 standard drinks     Types: 2 Standard drinks or equivalent per week     Frequency: Monthly or less     Drinks per session: 1 or 2     Binge frequency: Never     Comment: occasional     Drug use: No       Family History   Problem Relation Age of Onset     Heart disease Mother     Hypertension Mother     Cancer Father 88        sarcoma( ?[place]'s ) on leg    Deep vein thrombosis Neg Hx     Ovarian cancer Neg Hx     Breast cancer Neg Hx     Kidney disease Neg Hx     Strabismus Neg Hx     Retinal detachment Neg Hx     Macular degeneration Neg Hx     Glaucoma Neg Hx     Blindness Neg Hx     Amblyopia Neg Hx     Eczema Neg Hx     Lupus Neg Hx     Melanoma Neg Hx     Psoriasis Neg Hx     Diabetes Neg Hx     Stroke Neg Hx     Mental retardation Neg Hx     Mental illness Neg Hx     Hyperlipidemia Neg Hx     COPD Neg Hx     Asthma Neg Hx     Depression Neg Hx     Alcohol abuse Neg Hx     Drug abuse Neg Hx          Review of Systems   Constitution: Positive for malaise/fatigue and weight loss. Negative for decreased appetite, diaphoresis, fever and night sweats.   HENT: Negative for nosebleeds.    Eyes: Negative for blurred vision and double vision.   Cardiovascular: Positive for palpitations. Negative for chest pain, claudication, irregular heartbeat, leg swelling, near-syncope, orthopnea, paroxysmal nocturnal dyspnea and syncope.   Respiratory: Negative for cough, shortness of breath, sleep disturbances due to breathing, snoring, sputum production and wheezing.    Endocrine: Negative for cold intolerance and polyuria.   Hematologic/Lymphatic: Does not bruise/bleed easily.   Skin: Negative for rash.   Musculoskeletal: Negative for back pain, falls, joint pain, joint swelling and neck pain.   Gastrointestinal: Positive for abdominal pain. Negative for heartburn, nausea and vomiting.   Genitourinary: Negative for dysuria, frequency and hematuria.   Neurological: Negative for difficulty with concentration, dizziness, focal weakness, headaches, light-headedness, numbness, seizures and weakness.   Psychiatric/Behavioral: Negative for depression, memory loss and substance abuse. The patient does not have insomnia.    Allergic/Immunologic: Negative for HIV  exposure and hives.       Objective:   Physical Exam   Constitutional: She is oriented to person, place, and time. She appears well-nourished.   HENT:   Head: Normocephalic.   Eyes: Pupils are equal, round, and reactive to light.   Neck: Normal carotid pulses and no JVD present. Carotid bruit is not present. No thyromegaly present.   Cardiovascular: Normal rate, regular rhythm, normal heart sounds and normal pulses.  No extrasystoles are present. PMI is not displaced. Exam reveals no gallop and no S3.   No murmur heard.  Pulmonary/Chest: Breath sounds normal. No stridor. No respiratory distress.   Abdominal: Soft. Bowel sounds are normal. There is no tenderness. There is no rebound.   Musculoskeletal: Normal range of motion.   Neurological: She is alert and oriented to person, place, and time.   Skin: Skin is intact. No rash noted.   Psychiatric: Her behavior is normal.       Lab Results   Component Value Date    CHOL 233 (H) 08/01/2019    CHOL 180 03/16/2018    CHOL 181 02/19/2018     Lab Results   Component Value Date    HDL 49 08/01/2019    HDL 43 03/16/2018    HDL 45 02/19/2018     Lab Results   Component Value Date    LDLCALC 155.0 08/01/2019    LDLCALC 117.6 03/16/2018    LDLCALC 118.2 02/19/2018     Lab Results   Component Value Date    TRIG 145 08/01/2019    TRIG 97 03/16/2018    TRIG 89 02/19/2018     Lab Results   Component Value Date    CHOLHDL 21.0 08/01/2019    CHOLHDL 23.9 03/16/2018    CHOLHDL 24.9 02/19/2018       Chemistry        Component Value Date/Time     01/08/2020 1342     01/08/2020 1342    K 3.9 01/08/2020 1342    K 3.9 01/08/2020 1342     01/08/2020 1342     01/08/2020 1342    CO2 29 01/08/2020 1342    CO2 29 01/08/2020 1342    BUN 26 (H) 01/08/2020 1342    BUN 26 (H) 01/08/2020 1342    CREATININE 1.3 01/08/2020 1342    CREATININE 1.3 01/08/2020 1342    GLU 94 01/08/2020 1342    GLU 94 01/08/2020 1342        Component Value Date/Time    CALCIUM 9.9 01/08/2020 1341     CALCIUM 9.9 01/08/2020 1342    ALKPHOS 76 10/09/2019 0203    AST 42 (H) 10/09/2019 0203    ALT 48 (H) 10/09/2019 0203    BILITOT 0.5 10/09/2019 0203    ESTGFRAFRICA 42.6 (A) 01/08/2020 1342    ESTGFRAFRICA 42.6 (A) 01/08/2020 1342    EGFRNONAA 37.0 (A) 01/08/2020 1342    EGFRNONAA 37.0 (A) 01/08/2020 1342          Lab Results   Component Value Date    HGBA1C 6.4 (H) 01/08/2020     Lab Results   Component Value Date    TSH 0.849 10/09/2019     Lab Results   Component Value Date    INR 1.0 01/25/2018    INR 1.0 01/01/2017    INR 1.0 02/09/2016     Lab Results   Component Value Date    WBC 8.57 10/11/2019    HGB 10.4 (L) 10/11/2019    HCT 32.6 (L) 10/11/2019    MCV 99 (H) 10/11/2019     10/11/2019     BMP  Sodium   Date Value Ref Range Status   01/08/2020 140 136 - 145 mmol/L Final   01/08/2020 140 136 - 145 mmol/L Final     Potassium   Date Value Ref Range Status   01/08/2020 3.9 3.5 - 5.1 mmol/L Final   01/08/2020 3.9 3.5 - 5.1 mmol/L Final     Chloride   Date Value Ref Range Status   01/08/2020 103 95 - 110 mmol/L Final   01/08/2020 103 95 - 110 mmol/L Final     CO2   Date Value Ref Range Status   01/08/2020 29 23 - 29 mmol/L Final   01/08/2020 29 23 - 29 mmol/L Final     BUN, Bld   Date Value Ref Range Status   01/08/2020 26 (H) 8 - 23 mg/dL Final   01/08/2020 26 (H) 8 - 23 mg/dL Final     Creatinine   Date Value Ref Range Status   01/08/2020 1.3 0.5 - 1.4 mg/dL Final   01/08/2020 1.3 0.5 - 1.4 mg/dL Final     Calcium   Date Value Ref Range Status   01/08/2020 9.9 8.7 - 10.5 mg/dL Final   01/08/2020 9.9 8.7 - 10.5 mg/dL Final     Anion Gap   Date Value Ref Range Status   01/08/2020 8 8 - 16 mmol/L Final   01/08/2020 8 8 - 16 mmol/L Final     eGFR if    Date Value Ref Range Status   01/08/2020 42.6 (A) >60 mL/min/1.73 m^2 Final   01/08/2020 42.6 (A) >60 mL/min/1.73 m^2 Final     eGFR if non    Date Value Ref Range Status   01/08/2020 37.0 (A) >60 mL/min/1.73 m^2 Final      Comment:     Calculation used to obtain the estimated glomerular filtration  rate (eGFR) is the CKD-EPI equation.      01/08/2020 37.0 (A) >60 mL/min/1.73 m^2 Final     Comment:     Calculation used to obtain the estimated glomerular filtration  rate (eGFR) is the CKD-EPI equation.        BNP  @LABRCNTIP(BNP,BNPTRIAGEBLO)@  @LABRCNTIP(troponini)@  CrCl cannot be calculated (Patient's most recent lab result is older than the maximum 7 days allowed.).  No results found in the last 24 hours.  No results found in the last 24 hours.  No results found in the last 24 hours.    Assessment:      1. Essential hypertension    2. Chronic diastolic congestive heart failure    3. New onset a-fib    4. Unequal blood pressures in paired extremities        Plan:   Add cardura 4 mg daily  Continue clonidine 0.2 mg tid, losartan 100 gm and metoprolol 25 mg bid  Carotid US and left arm arterial US for different BP reading on both arms  DASH  Check BP at home  Continue Eliquis 2.5 mg bid for PAF.  rtC in 3 months sooner if indicated

## 2020-01-30 NOTE — TELEPHONE ENCOUNTER
Patient contacted the office to report high BP readings that have lasted all this week in the ranges of 190/90. Patient stated she has been taking all her medications as prescribed. Patient  Would like to be advised if she should go to the ER or have a scheduled visit today?

## 2020-02-01 ENCOUNTER — HOSPITAL ENCOUNTER (OUTPATIENT)
Facility: HOSPITAL | Age: 85
Discharge: HOME | End: 2020-02-03
Attending: EMERGENCY MEDICINE | Admitting: FAMILY MEDICINE
Payer: MEDICARE

## 2020-02-01 DIAGNOSIS — I10 CHRONIC HYPERTENSION: ICD-10-CM

## 2020-02-01 DIAGNOSIS — I16.0 HYPERTENSIVE URGENCY: Primary | ICD-10-CM

## 2020-02-01 DIAGNOSIS — R53.1 WEAKNESS: ICD-10-CM

## 2020-02-01 LAB
ALBUMIN SERPL BCP-MCNC: 4 G/DL (ref 3.5–5.2)
ALP SERPL-CCNC: 79 U/L (ref 55–135)
ALT SERPL W/O P-5'-P-CCNC: 11 U/L (ref 10–44)
ANION GAP SERPL CALC-SCNC: 14 MMOL/L (ref 8–16)
AST SERPL-CCNC: 16 U/L (ref 10–40)
BASOPHILS # BLD AUTO: 0.03 K/UL (ref 0–0.2)
BASOPHILS NFR BLD: 0.4 % (ref 0–1.9)
BILIRUB SERPL-MCNC: 0.6 MG/DL (ref 0.1–1)
BNP SERPL-MCNC: 621 PG/ML (ref 0–99)
BUN SERPL-MCNC: 25 MG/DL (ref 8–23)
CALCIUM SERPL-MCNC: 9.9 MG/DL (ref 8.7–10.5)
CHLORIDE SERPL-SCNC: 102 MMOL/L (ref 95–110)
CO2 SERPL-SCNC: 25 MMOL/L (ref 23–29)
CREAT SERPL-MCNC: 1.4 MG/DL (ref 0.5–1.4)
DIFFERENTIAL METHOD: ABNORMAL
EOSINOPHIL # BLD AUTO: 0.2 K/UL (ref 0–0.5)
EOSINOPHIL NFR BLD: 2.1 % (ref 0–8)
ERYTHROCYTE [DISTWIDTH] IN BLOOD BY AUTOMATED COUNT: 13.2 % (ref 11.5–14.5)
EST. GFR  (AFRICAN AMERICAN): 39 ML/MIN/1.73 M^2
EST. GFR  (NON AFRICAN AMERICAN): 34 ML/MIN/1.73 M^2
GLUCOSE SERPL-MCNC: 106 MG/DL (ref 70–110)
HCT VFR BLD AUTO: 42.3 % (ref 37–48.5)
HGB BLD-MCNC: 13.3 G/DL (ref 12–16)
IMM GRANULOCYTES # BLD AUTO: 0.02 K/UL (ref 0–0.04)
IMM GRANULOCYTES NFR BLD AUTO: 0.3 % (ref 0–0.5)
LYMPHOCYTES # BLD AUTO: 2 K/UL (ref 1–4.8)
LYMPHOCYTES NFR BLD: 26 % (ref 18–48)
MCH RBC QN AUTO: 31 PG (ref 27–31)
MCHC RBC AUTO-ENTMCNC: 31.4 G/DL (ref 32–36)
MCV RBC AUTO: 99 FL (ref 82–98)
MONOCYTES # BLD AUTO: 0.7 K/UL (ref 0.3–1)
MONOCYTES NFR BLD: 9.8 % (ref 4–15)
NEUTROPHILS # BLD AUTO: 4.7 K/UL (ref 1.8–7.7)
NEUTROPHILS NFR BLD: 61.4 % (ref 38–73)
NRBC BLD-RTO: 0 /100 WBC
PLATELET # BLD AUTO: 202 K/UL (ref 150–350)
PMV BLD AUTO: 11.3 FL (ref 9.2–12.9)
POTASSIUM SERPL-SCNC: 4.2 MMOL/L (ref 3.5–5.1)
PROT SERPL-MCNC: 7.4 G/DL (ref 6–8.4)
RBC # BLD AUTO: 4.29 M/UL (ref 4–5.4)
SODIUM SERPL-SCNC: 141 MMOL/L (ref 136–145)
TROPONIN I SERPL DL<=0.01 NG/ML-MCNC: 0.03 NG/ML (ref 0–0.03)
WBC # BLD AUTO: 7.58 K/UL (ref 3.9–12.7)

## 2020-02-01 PROCEDURE — 99291 CRITICAL CARE FIRST HOUR: CPT | Mod: HCNC

## 2020-02-01 PROCEDURE — 25000003 PHARM REV CODE 250: Mod: HCNC | Performed by: FAMILY MEDICINE

## 2020-02-01 PROCEDURE — 84484 ASSAY OF TROPONIN QUANT: CPT | Mod: HCNC

## 2020-02-01 PROCEDURE — 80053 COMPREHEN METABOLIC PANEL: CPT | Mod: HCNC

## 2020-02-01 PROCEDURE — 96374 THER/PROPH/DIAG INJ IV PUSH: CPT | Mod: HCNC

## 2020-02-01 PROCEDURE — 36415 COLL VENOUS BLD VENIPUNCTURE: CPT | Mod: HCNC

## 2020-02-01 PROCEDURE — 93005 ELECTROCARDIOGRAM TRACING: CPT | Mod: HCNC

## 2020-02-01 PROCEDURE — 83880 ASSAY OF NATRIURETIC PEPTIDE: CPT | Mod: HCNC

## 2020-02-01 PROCEDURE — 84484 ASSAY OF TROPONIN QUANT: CPT | Mod: 91,HCNC

## 2020-02-01 PROCEDURE — 85025 COMPLETE CBC W/AUTO DIFF WBC: CPT | Mod: HCNC

## 2020-02-01 PROCEDURE — G0378 HOSPITAL OBSERVATION PER HR: HCPCS | Mod: HCNC

## 2020-02-01 PROCEDURE — 93010 EKG 12-LEAD: ICD-10-PCS | Mod: HCNC,,, | Performed by: INTERNAL MEDICINE

## 2020-02-01 PROCEDURE — 25000003 PHARM REV CODE 250: Mod: HCNC | Performed by: EMERGENCY MEDICINE

## 2020-02-01 PROCEDURE — 93010 ELECTROCARDIOGRAM REPORT: CPT | Mod: HCNC,,, | Performed by: INTERNAL MEDICINE

## 2020-02-01 RX ORDER — PANTOPRAZOLE SODIUM 40 MG/1
40 TABLET, DELAYED RELEASE ORAL DAILY
Status: DISCONTINUED | OUTPATIENT
Start: 2020-02-02 | End: 2020-02-03 | Stop reason: HOSPADM

## 2020-02-01 RX ORDER — ACETAMINOPHEN 325 MG/1
650 TABLET ORAL EVERY 6 HOURS PRN
Status: DISCONTINUED | OUTPATIENT
Start: 2020-02-01 | End: 2020-02-03 | Stop reason: HOSPADM

## 2020-02-01 RX ORDER — ALPRAZOLAM 0.5 MG/1
0.5 TABLET ORAL 2 TIMES DAILY PRN
Status: DISCONTINUED | OUTPATIENT
Start: 2020-02-01 | End: 2020-02-03 | Stop reason: HOSPADM

## 2020-02-01 RX ORDER — METOPROLOL TARTRATE 25 MG/1
25 TABLET, FILM COATED ORAL 2 TIMES DAILY
Status: DISCONTINUED | OUTPATIENT
Start: 2020-02-01 | End: 2020-02-02

## 2020-02-01 RX ORDER — ENALAPRILAT 1.25 MG/ML
0.62 INJECTION INTRAVENOUS
Status: COMPLETED | OUTPATIENT
Start: 2020-02-01 | End: 2020-02-01

## 2020-02-01 RX ORDER — POLYETHYLENE GLYCOL 3350 17 G/17G
17 POWDER, FOR SOLUTION ORAL 2 TIMES DAILY PRN
Status: DISCONTINUED | OUTPATIENT
Start: 2020-02-01 | End: 2020-02-03 | Stop reason: HOSPADM

## 2020-02-01 RX ORDER — DOXAZOSIN 2 MG/1
4 TABLET ORAL NIGHTLY
Status: DISCONTINUED | OUTPATIENT
Start: 2020-02-01 | End: 2020-02-03 | Stop reason: HOSPADM

## 2020-02-01 RX ORDER — TRAVOPROST OPHTHALMIC SOLUTION 0.04 MG/ML
1 SOLUTION OPHTHALMIC NIGHTLY
Status: DISCONTINUED | OUTPATIENT
Start: 2020-02-01 | End: 2020-02-03 | Stop reason: HOSPADM

## 2020-02-01 RX ORDER — ONDANSETRON 2 MG/ML
4 INJECTION INTRAMUSCULAR; INTRAVENOUS EVERY 6 HOURS PRN
Status: DISCONTINUED | OUTPATIENT
Start: 2020-02-01 | End: 2020-02-03 | Stop reason: HOSPADM

## 2020-02-01 RX ORDER — CLONIDINE HYDROCHLORIDE 0.2 MG/1
0.2 TABLET ORAL 3 TIMES DAILY
Status: DISCONTINUED | OUTPATIENT
Start: 2020-02-02 | End: 2020-02-02

## 2020-02-01 RX ORDER — LOSARTAN POTASSIUM 50 MG/1
100 TABLET ORAL DAILY
Status: DISCONTINUED | OUTPATIENT
Start: 2020-02-02 | End: 2020-02-03 | Stop reason: HOSPADM

## 2020-02-01 RX ORDER — SPIRONOLACTONE 25 MG/1
25 TABLET ORAL DAILY
Status: DISCONTINUED | OUTPATIENT
Start: 2020-02-01 | End: 2020-02-03 | Stop reason: HOSPADM

## 2020-02-01 RX ADMIN — SPIRONOLACTONE 25 MG: 25 TABLET ORAL at 11:02

## 2020-02-01 RX ADMIN — ENALAPRILAT 0.62 MG: 1.25 INJECTION INTRAVENOUS at 05:02

## 2020-02-01 RX ADMIN — NITROGLYCERIN 1 INCH: 20 OINTMENT TOPICAL at 07:02

## 2020-02-01 RX ADMIN — METOPROLOL TARTRATE 25 MG: 25 TABLET, FILM COATED ORAL at 11:02

## 2020-02-01 RX ADMIN — APIXABAN 2.5 MG: 2.5 TABLET, FILM COATED ORAL at 11:02

## 2020-02-01 NOTE — ED PROVIDER NOTES
SCRIBE #1 NOTE: I, Shaila Griffin, am scribing for, and in the presence of, Trang Tobar MD. I have scribed the entire note.       History     Chief Complaint   Patient presents with    Fatigue     elevated BP x weeks, weakness     Review of patient's allergies indicates:   Allergen Reactions    Claritin [loratadine] Other (See Comments)     Double vision/spots    Hydrocodone-acetaminophen      wheezing    Labetalol Shortness Of Breath, Nausea Only and Other (See Comments)     dizziness    Naproxen      wheezing    Verapamil Diarrhea    Vibramycin [doxycycline calcium] Other (See Comments)     Weakness nausea   sob    Amlodipine      Myalgias      Coreg [carvedilol] Swelling     lips    Fenofibrate      Causes muscle weakness    Hydralazine analogues      Muscle tremors/aches      Isosorbide     Lasix [furosemide]      myalgias    Moxifloxacin Other (See Comments)     Hives   muscle soreness    Timolol     Adhesive Rash     Clonidine patch - 2 mfg - contact dermatitis    Allegra [fexofenadine] Other (See Comments)     Double vision/spots     Codeine Diarrhea and Other (See Comments)     Loss of balance     Erythromycin Other (See Comments)     Complete weakness/could not walk    Hydrocortisone (bulk) Other (See Comments)     Only suppository/ caused muscle weakness    Macrolide antibiotics Other (See Comments)     Complete weakness/could not walk    Patanol [olopatadine] Other (See Comments)     Muscle weakness    Prednisone Anxiety    Statins-hmg-coa reductase inhibitors Other (See Comments)     Muscle weakness    Xalatan [latanoprost] Other (See Comments)     Muscle weakness         History of Present Illness     HPI    2/1/2020, 4:30 PM  History obtained from the patient      History of Present Illness: Shirley Metz is a 87 y.o. female patient with a PMHx of Afib with RVR, CHF, HLD, and HTN who presents to the Emergency Department for evaluation of elevated blood  pressure which onset gradually several weeks ago. Reports her blood pressure has gradually increased every day. Symptoms are worsening and moderate in severity. No mitigating or exacerbating factors reported. Associated sxs include generalized weakness and fatigue. Patient denies any HA, blurred vision, CP, SOB, cough, palpitations, N/V, abdominal pain, and all other sxs at this time. Patient admits taking incorrect clonidine dosage to try to lower her blood pressure with no improvement. No further complaints or concerns at this time.         Arrival mode: Personal vehicle    PCP: Yandy Terrell MD        Past Medical History:  Past Medical History:   Diagnosis Date    Anemia 11/29/2018    Anxiety 11/28/2017    Arthritis     Atrial fibrillation with RVR 10/9/2019    Back pain     Basal cell carcinoma 03/29/2018    left mid back    Chronic diastolic congestive heart failure 10/15/2019    Colon polyps     reported per patient.     Fuchs' corneal dystrophy     General anesthetics causing adverse effect in therapeutic use     woke up during surgery and gagged on ET tube    Glaucoma     Glaucoma     Heart failure with preserved left ventricular function (HFpEF) 7/24/2018    Hyperlipidemia     Hypertension     Macular degeneration dry    Obesity     Squamous cell carcinoma 01/08/2019    left shoulder    Trouble in sleeping     Urinary tract infection        Past Surgical History:  Past Surgical History:   Procedure Laterality Date    ADENOIDECTOMY  1938    BREAST BIOPSY Left     1997. benign    BREAST CYST EXCISION Left 1997 approx    CARPAL TUNNEL RELEASE Right 2012 approx    CATARACT EXTRACTION Bilateral 1994    CHOLECYSTECTOMY  1975    CORNEAL TRANSPLANT Bilateral 08/2015 8/2015 - left; Right 4/2016    EYE SURGERY      Fuch's Corneal dystrophy - had 2nd operation with Dr. Quintanilla    EYE SURGERY      Cataract wIOL    left thumb Left 2011    removed cuboid for arthritis    REVERSE  TOTAL SHOULDER ARTHROPLASTY Left 8/21/2018    Procedure: ARTHROPLASTY, SHOULDER, TOTAL, REVERSE;  Surgeon: Solomon Lujan MD;  Location: [facility] OR;  Service: Orthopedics;  Laterality: Left;  WITH BICEP TENODESIS    SKIN CANCER EXCISION Left 2017    BCC removal by Dr. Valadez    SLT- OS (aka TRABECULOPLASTY) Left 05/11/2011    repeat 3/14/12    TENOTOMY Left 8/21/2018    Procedure: TENOTOMY;  Surgeon: Solomon Lujan MD;  Location: [facility] OR;  Service: Orthopedics;  Laterality: Left;    TONSILLECTOMY  1938    TREATMENT OF CARDIAC ARRHYTHMIA N/A 10/10/2019    Procedure: CARDIOVERSION;  Surgeon: Pete Durán MD;  Location: [facility] CATH LAB;  Service: Cardiology;  Laterality: N/A;    TREATMENT OF CARDIAC ARRHYTHMIA N/A 12/6/2019    Procedure: CARDIOVERSION;  Surgeon: Samy Castillo MD;  Location: [facility] CATH LAB;  Service: Cardiology;  Laterality: N/A;         Family History:  Family History   Problem Relation Age of Onset    Heart disease Mother     Hypertension Mother     Cancer Father 88        sarcoma( ?[place]'s ) on leg    Deep vein thrombosis Neg Hx     Ovarian cancer Neg Hx     Breast cancer Neg Hx     Kidney disease Neg Hx     Strabismus Neg Hx     Retinal detachment Neg Hx     Macular degeneration Neg Hx     Glaucoma Neg Hx     Blindness Neg Hx     Amblyopia Neg Hx     Eczema Neg Hx     Lupus Neg Hx     Melanoma Neg Hx     Psoriasis Neg Hx     Diabetes Neg Hx     Stroke Neg Hx     Mental retardation Neg Hx     Mental illness Neg Hx     Hyperlipidemia Neg Hx     COPD Neg Hx     Asthma Neg Hx     Depression Neg Hx     Alcohol abuse Neg Hx     Drug abuse Neg Hx        Social History:  Social History     Tobacco Use    Smoking status: Never Smoker    Smokeless tobacco: Never Used    Tobacco comment: history of passive smoking from her ex-   Substance and Sexual Activity    Alcohol use: Not Currently     Alcohol/week: 2.0 standard drinks     Types: 2 Standard drinks or  equivalent per week     Frequency: Monthly or less     Drinks per session: 1 or 2     Binge frequency: Never     Comment: occasional     Drug use: No    Sexual activity: Not Currently     Partners: Male     Birth control/protection: Post-menopausal     Comment: discussed protection for STD's        Review of Systems     Review of Systems   Constitutional: Positive for fatigue. Negative for activity change, appetite change, chills, diaphoresis and fever.        (+) elevated BP  (+) generalized weakness   HENT: Negative for congestion, ear pain, nosebleeds, rhinorrhea, sinus pain, sore throat and trouble swallowing.    Eyes: Negative for pain, discharge and visual disturbance.   Respiratory: Negative for cough, chest tightness, shortness of breath, wheezing and stridor.    Cardiovascular: Negative for chest pain, palpitations and leg swelling.   Gastrointestinal: Negative for abdominal distention, abdominal pain, blood in stool, constipation, diarrhea, nausea and vomiting.   Genitourinary: Negative for difficulty urinating, dysuria, flank pain, frequency, hematuria and urgency.   Musculoskeletal: Negative for arthralgias, back pain, myalgias and neck pain.   Skin: Negative for pallor, rash and wound.   Neurological: Negative for dizziness, syncope, weakness, light-headedness, numbness and headaches.   Hematological: Does not bruise/bleed easily.   Psychiatric/Behavioral: Negative for confusion and self-injury.   All other systems reviewed and are negative.     Physical Exam     Initial Vitals [02/01/20 1557]   BP Pulse Resp Temp SpO2   (!) 204/88 (!) 56 16 98.3 °F (36.8 °C) 95 %      MAP       --          Physical Exam  Nursing Notes and Vital Signs Reviewed.  Constitutional: Patient is in no acute distress. Well-developed and well-nourished.  Head: Atraumatic. Normocephalic.  Eyes: PERRL. EOM intact. Conjunctivae are not pale. No scleral icterus.  ENT: Mucous membranes are moist. Oropharynx is clear and symmetric.     Neck: Supple. Full ROM. No lymphadenopathy.  Cardiovascular: Regular rate. Regular rhythm. No murmurs, rubs, or gallops. Distal pulses are 2+ and symmetric.  Pulmonary/Chest: No respiratory distress. Clear to auscultation bilaterally. No wheezing or rales.  Abdominal: Soft and non-distended.  There is no tenderness.  No rebound, guarding, or rigidity. Good bowel sounds.  Genitourinary: No CVA tenderness  Musculoskeletal: Moves all extremities. No obvious deformities. No edema. No calf tenderness.  Skin: Warm and dry.  Neurological:  Alert, awake, and appropriate.  Normal speech.  No acute focal neurological deficits are appreciated.  Psychiatric: Normal affect. Good eye contact. Appropriate in content.     ED Course   Critical Care  Date/Time: 2/1/2020 8:37 PM  Performed by: Trang Tobar MD  Authorized by: Trang Tobar MD   Direct patient critical care time: 16 minutes  Additional history critical care time: 7 minutes  Ordering / reviewing critical care time: 12 minutes  Documentation critical care time: 4 minutes  Consulting other physicians critical care time: 6 minutes  Consult with family critical care time: 0 minutes  Other critical care time: 0 minutes  Total critical care time (exclusive of procedural time) : 45 minutes  Critical care time was exclusive of separately billable procedures and treating other patients and teaching time.  Critical care was necessary to treat or prevent imminent or life-threatening deterioration of the following conditions: hypertensive urgency.  Critical care was time spent personally by me on the following activities: blood draw for specimens, development of treatment plan with patient or surrogate, discussions with consultants, interpretation of cardiac output measurements, evaluation of patient's response to treatment, examination of patient, obtaining history from patient or surrogate, ordering and performing treatments and interventions, ordering and  review of laboratory studies, pulse oximetry, re-evaluation of patient's condition and review of old charts.        ED Vital Signs:  Vitals:    02/01/20 1641 02/01/20 1702 02/01/20 1706 02/01/20 1713   BP:  (!) 202/96 (!) 177/74 (!) 199/83   Pulse: (!) 49 (!) 53 (!) 53 (!) 50   Resp:  20 20 19   Temp:       TempSrc:       SpO2:  95% 96% 96%   Weight:        02/01/20 1732 02/01/20 1802 02/01/20 1818 02/01/20 1903   BP: (!) 205/85 (!) 179/78 (!) 197/84 (!) 199/98   Pulse: (!) 49 (!) 50 (!) 51 (!) 53   Resp: 18 20 20    Temp:       TempSrc:       SpO2: 96% 96% 96% 97%   Weight:        02/01/20 1917 02/01/20 1933 02/01/20 1943 02/01/20 1946   BP: (!) 204/92 (!) 196/88  (!) 189/71   Pulse: (!) 48 (!) 51  (!) 53   Resp:       Temp:       TempSrc:       SpO2: 98% 95% 96%    Weight:        02/01/20 2000 02/01/20 2002 02/01/20 2007   BP:  (!) 169/71    Pulse:  (!) 58    Resp:      Temp:      TempSrc:      SpO2: 96%  96%   Weight:          Abnormal Lab Results:  Labs Reviewed   CBC W/ AUTO DIFFERENTIAL - Abnormal; Notable for the following components:       Result Value    Mean Corpuscular Volume 99 (*)     Mean Corpuscular Hemoglobin Conc 31.4 (*)     All other components within normal limits   COMPREHENSIVE METABOLIC PANEL - Abnormal; Notable for the following components:    BUN, Bld 25 (*)     eGFR if  39 (*)     eGFR if non  34 (*)     All other components within normal limits   TROPONIN I - Abnormal; Notable for the following components:    Troponin I 0.030 (*)     All other components within normal limits   B-TYPE NATRIURETIC PEPTIDE - Abnormal; Notable for the following components:     (*)     All other components within normal limits        All Lab Results:  Results for orders placed or performed during the hospital encounter of 02/01/20   CBC auto differential   Result Value Ref Range    WBC 7.58 3.90 - 12.70 K/uL    RBC 4.29 4.00 - 5.40 M/uL    Hemoglobin 13.3 12.0 - 16.0 g/dL     Hematocrit 42.3 37.0 - 48.5 %    Mean Corpuscular Volume 99 (H) 82 - 98 fL    Mean Corpuscular Hemoglobin 31.0 27.0 - 31.0 pg    Mean Corpuscular Hemoglobin Conc 31.4 (L) 32.0 - 36.0 g/dL    RDW 13.2 11.5 - 14.5 %    Platelets 202 150 - 350 K/uL    MPV 11.3 9.2 - 12.9 fL    Immature Granulocytes 0.3 0.0 - 0.5 %    Gran # (ANC) 4.7 1.8 - 7.7 K/uL    Immature Grans (Abs) 0.02 0.00 - 0.04 K/uL    Lymph # 2.0 1.0 - 4.8 K/uL    Mono # 0.7 0.3 - 1.0 K/uL    Eos # 0.2 0.0 - 0.5 K/uL    Baso # 0.03 0.00 - 0.20 K/uL    nRBC 0 0 /100 WBC    Gran% 61.4 38.0 - 73.0 %    Lymph% 26.0 18.0 - 48.0 %    Mono% 9.8 4.0 - 15.0 %    Eosinophil% 2.1 0.0 - 8.0 %    Basophil% 0.4 0.0 - 1.9 %    Differential Method Automated    Comprehensive metabolic panel   Result Value Ref Range    Sodium 141 136 - 145 mmol/L    Potassium 4.2 3.5 - 5.1 mmol/L    Chloride 102 95 - 110 mmol/L    CO2 25 23 - 29 mmol/L    Glucose 106 70 - 110 mg/dL    BUN, Bld 25 (H) 8 - 23 mg/dL    Creatinine 1.4 0.5 - 1.4 mg/dL    Calcium 9.9 8.7 - 10.5 mg/dL    Total Protein 7.4 6.0 - 8.4 g/dL    Albumin 4.0 3.5 - 5.2 g/dL    Total Bilirubin 0.6 0.1 - 1.0 mg/dL    Alkaline Phosphatase 79 55 - 135 U/L    AST 16 10 - 40 U/L    ALT 11 10 - 44 U/L    Anion Gap 14 8 - 16 mmol/L    eGFR if African American 39 (A) >60 mL/min/1.73 m^2    eGFR if non African American 34 (A) >60 mL/min/1.73 m^2   Troponin I #1   Result Value Ref Range    Troponin I 0.030 (H) 0.000 - 0.026 ng/mL   B-Type natriuretic peptide (BNP)   Result Value Ref Range     (H) 0 - 99 pg/mL         Imaging Results:  Imaging Results    None          The EKG was ordered, reviewed, and independently interpreted by the ED provider.  Interpretation time: 15:58  Rate: 51 BPM  Rhythm: sinus bradycardia  Interpretation: Nonspecific ST abnormality. No STEMI.  When compared to EKG performed 1/24/2020, there are no significant changes.           The Emergency Provider reviewed the vital signs and test results, which  are outlined above.     ED Discussion     7:32 PM: Discussed pt's case with Tracie Lancaster NP (hospitals medicine) who recommends try nitro paste and if BP does not improve will admit to ICU.    8:19 PM: Discussed case with Tracie Lancaster NP ([facility]). Dr. Esposito agrees with current care and management of pt and accepts admission.   Admitting Service: hospital medicine  Admitting Physician: Dr. Esposito  Admit to: observation tele    8:20 PM: Re-evaluated pt. I have discussed test results, shared treatment plan, and the need for admission with patient and family at bedside. Pt and family express understanding at this time and agree with all information. All questions answered. Pt and family have no further questions or concerns at this time. Pt is ready for admit.       Medical Decision Making:   Clinical Tests:   Lab Tests: Ordered and Reviewed  Medical Tests: Ordered and Reviewed           ED Medication(s):  Medications   enalaprilat injection 0.625 mg (0.625 mg Intravenous Given 2/1/20 1703)   nitroGLYCERIN 2% TD oint ointment 1 inch (1 inch Topical (Top) Given 2/1/20 1937)       Scribe Attestation:   Scribe #1: I performed the above scribed service and the documentation accurately describes the services I performed. I attest to the accuracy of the note.     Attending:   Physician Attestation Statement for Scribe #1: I, Trang Tobar MD, personally performed the services described in this documentation, as scribed by Shaila Griffin, in my presence, and it is both accurate and complete.           Clinical Impression       ICD-10-CM ICD-9-CM   1. Hypertensive urgency I16.0 401.9   2. Weakness R53.1 780.79   3. Chronic hypertension I10 401.9       Disposition:   Disposition: Placed in Observation  Condition: Fair         Trang Tobar MD  02/01/20 7990

## 2020-02-02 PROBLEM — R00.1 SINUS BRADYCARDIA: Status: ACTIVE | Noted: 2020-02-02

## 2020-02-02 LAB
ANION GAP SERPL CALC-SCNC: 10 MMOL/L (ref 8–16)
BUN SERPL-MCNC: 28 MG/DL (ref 8–23)
CALCIUM SERPL-MCNC: 9.5 MG/DL (ref 8.7–10.5)
CHLORIDE SERPL-SCNC: 105 MMOL/L (ref 95–110)
CO2 SERPL-SCNC: 25 MMOL/L (ref 23–29)
CREAT SERPL-MCNC: 1.3 MG/DL (ref 0.5–1.4)
EST. GFR  (AFRICAN AMERICAN): 43 ML/MIN/1.73 M^2
EST. GFR  (NON AFRICAN AMERICAN): 37 ML/MIN/1.73 M^2
GLUCOSE SERPL-MCNC: 101 MG/DL (ref 70–110)
POTASSIUM SERPL-SCNC: 4.1 MMOL/L (ref 3.5–5.1)
SODIUM SERPL-SCNC: 140 MMOL/L (ref 136–145)
TROPONIN I SERPL DL<=0.01 NG/ML-MCNC: 0.03 NG/ML (ref 0–0.03)
TROPONIN I SERPL DL<=0.01 NG/ML-MCNC: 0.03 NG/ML (ref 0–0.03)

## 2020-02-02 PROCEDURE — 97161 PT EVAL LOW COMPLEX 20 MIN: CPT | Mod: HCNC

## 2020-02-02 PROCEDURE — 36415 COLL VENOUS BLD VENIPUNCTURE: CPT | Mod: HCNC

## 2020-02-02 PROCEDURE — 80048 BASIC METABOLIC PNL TOTAL CA: CPT | Mod: HCNC

## 2020-02-02 PROCEDURE — 25000003 PHARM REV CODE 250: Mod: HCNC | Performed by: FAMILY MEDICINE

## 2020-02-02 PROCEDURE — G0378 HOSPITAL OBSERVATION PER HR: HCPCS | Mod: HCNC

## 2020-02-02 PROCEDURE — 25000003 PHARM REV CODE 250: Mod: HCNC | Performed by: INTERNAL MEDICINE

## 2020-02-02 PROCEDURE — 97530 THERAPEUTIC ACTIVITIES: CPT | Mod: HCNC

## 2020-02-02 PROCEDURE — 84484 ASSAY OF TROPONIN QUANT: CPT | Mod: HCNC

## 2020-02-02 RX ORDER — CLONIDINE HYDROCHLORIDE 0.2 MG/1
0.2 TABLET ORAL 3 TIMES DAILY PRN
Status: DISCONTINUED | OUTPATIENT
Start: 2020-02-02 | End: 2020-02-03 | Stop reason: HOSPADM

## 2020-02-02 RX ORDER — ISOSORBIDE MONONITRATE 30 MG/1
30 TABLET, EXTENDED RELEASE ORAL DAILY
Status: DISCONTINUED | OUTPATIENT
Start: 2020-02-02 | End: 2020-02-03 | Stop reason: HOSPADM

## 2020-02-02 RX ORDER — DIPHENOXYLATE HYDROCHLORIDE AND ATROPINE SULFATE 2.5; .025 MG/1; MG/1
1 TABLET ORAL 4 TIMES DAILY PRN
Status: DISCONTINUED | OUTPATIENT
Start: 2020-02-02 | End: 2020-02-03 | Stop reason: HOSPADM

## 2020-02-02 RX ORDER — METOPROLOL TARTRATE 25 MG/1
12.5 TABLET ORAL 2 TIMES DAILY
Status: DISCONTINUED | OUTPATIENT
Start: 2020-02-02 | End: 2020-02-03 | Stop reason: HOSPADM

## 2020-02-02 RX ADMIN — ACETAMINOPHEN 650 MG: 325 TABLET ORAL at 09:02

## 2020-02-02 RX ADMIN — LOSARTAN POTASSIUM 100 MG: 50 TABLET, FILM COATED ORAL at 09:02

## 2020-02-02 RX ADMIN — POLYETHYLENE GLYCOL (3350) 17 G: 17 POWDER, FOR SOLUTION ORAL at 09:02

## 2020-02-02 RX ADMIN — DOXAZOSIN 4 MG: 2 TABLET ORAL at 09:02

## 2020-02-02 RX ADMIN — APIXABAN 2.5 MG: 2.5 TABLET, FILM COATED ORAL at 09:02

## 2020-02-02 RX ADMIN — PANTOPRAZOLE SODIUM 40 MG: 40 TABLET, DELAYED RELEASE ORAL at 09:02

## 2020-02-02 RX ADMIN — ACETAMINOPHEN 650 MG: 325 TABLET ORAL at 12:02

## 2020-02-02 RX ADMIN — SPIRONOLACTONE 25 MG: 25 TABLET ORAL at 09:02

## 2020-02-02 RX ADMIN — ALPRAZOLAM 0.5 MG: 0.5 TABLET ORAL at 09:02

## 2020-02-02 RX ADMIN — METOPROLOL TARTRATE 12.5 MG: 25 TABLET, FILM COATED ORAL at 09:02

## 2020-02-02 RX ADMIN — METOPROLOL TARTRATE 12.5 MG: 25 TABLET, FILM COATED ORAL at 10:02

## 2020-02-02 RX ADMIN — ISOSORBIDE MONONITRATE 30 MG: 30 TABLET, EXTENDED RELEASE ORAL at 10:02

## 2020-02-02 RX ADMIN — DIPHENOXYLATE HYDROCHLORIDE AND ATROPINE SULFATE 1 TABLET: 2.5; .025 TABLET ORAL at 04:02

## 2020-02-02 NOTE — ASSESSMENT & PLAN NOTE
- Likely due to #1.  - No angina, initial troponin minimally elevated at 0.03, EKG unrevealing.  - Trend serial CE's.  - ASA and beta blocker.  - Will consult Cardiology as needed.  2/2  Flat elevation and does not appear significant   Pt does not have chest pain   EKG- non specific ST  abnormality

## 2020-02-02 NOTE — HOSPITAL COURSE
2/2-  Admitted overnight for evaluation of markedly elevated blood pressure . No evidence of acute end organ damage . Renal function preserved . Cardiac troponin is marginally elevated and does not appears significant. BNP is elevated noted. Echo in 10/2019 resulted concentric hypertrophy , normal systolic and diastolic function and estimated  PA  pressure 40.     Clonidine was not given due to persistent bradycardia and risk of rebound HTN.  Metoprolol resumed at 12.5 mg bid   Added  Spironolactone 25 mg and Imdur 30 mg and pt seems to be tolerating and responding   Will monitor BP trend   Possible D/C in am   2/3  BP control is better with current regimen of antihypertensives . Pt was examined before discharge and found stable and suitable for discharge.

## 2020-02-02 NOTE — PROGRESS NOTES
"[facility] Hospital Medicine  Progress Note    Patient Name: Shirley Metz  MRN: [id number]  Patient Class: OP- Observation   Admission Date: 2/1/2020  Length of Stay: 0 days  Attending Physician: Levar Jacobo MD  Primary Care Provider: Yandy Terrell MD        Subjective:     Principal Problem:Accelerated hypertension        HPI:  Ms. Bettencourt is an 86yo female with a PMHx of resistant HTN, HLD, metabolic syndrome, and CKD, who presented to the ED with c/o generalized weakness and fatigue x several weeks.  Associated elevated blood pressure.  No aggravating or alleviating factors.  She reports taking 10 clonidine pills throughout the day today with no improvement in BP.  Denies any CP, palpitations, SOB/RUIZ, orthopnea, PND, edema, cough, ABD pain, N/V, diaphoresis, lightheadedness/dizziness, syncope, visual disturbances, HA, AMS, or fever.  She states being compliant with all BP medications.  She has h/o intolerance to multiple BP medications due to side effects.  Upon arrival to ED, /95.  Initial work-up resulted t roponin of 0.03, , and EKG unrevealing.  Patient was bradycardic in the ED, likely due to multiple doses of clonidine and beta-blocker today.  0.625 mg IV enalaprilat and 1" nitro paste given, which improved BP to 168/74.  Hospital Medicine was called for admission.      Overview/Hospital Course:  2/2-  Admitted overnight for evaluation of markedly elevated blood pressure . No evidence of acute end organ damage . Renal function preserved . Cardiac troponin is marginally elevated and does not appears significant. BNP is elevated noted. Echo in 10/2019 resulted concentric hypertrophy , normal systolic and diastolic function and estimated  PA  pressure 40.     Clonidine was not given due to persistent bradycardia and risk of rebound HTN.  Metoprolol resumed at 12.5 mg bid   Added  Spironolactone 25 mg and Imdur 30 mg and pt seems to be tolerating and responding "   Will monitor BP trend   Possible D/C in am       Interval History:     Admitted overnight for evaluation of markedly elevated blood pressure . No evidence of acute end organ damage . Renal function preserved . Cardiac troponin is marginally elevated and does not appears significant. BNP is elevated noted. Echo in 10/2019 resulted concentric hypertrophy , normal systolic and diastolic function and estimated  PA  pressure 40.     Clonidine was not given due to persistent bradycardia and risk of rebound HTN.  Metoprolol resumed at 12.5 mg bid   Added  Spironolactone 25 mg and Imdur 30 mg and pt seems to be tolerating and responding   Will monitor BP trend   Possible D/C in am     Review of Systems   Constitutional: Negative for activity change, appetite change and fever.   HENT: Negative for sore throat.    Eyes: Negative for visual disturbance.   Respiratory: Negative for cough, chest tightness and shortness of breath.    Cardiovascular: Negative for chest pain, palpitations and leg swelling.   Gastrointestinal: Negative for abdominal distention, abdominal pain, constipation, diarrhea, nausea and vomiting.   Endocrine: Negative for polyuria.   Genitourinary: Negative for decreased urine volume, dysuria, flank pain, frequency and hematuria.   Musculoskeletal: Negative for back pain and gait problem.   Skin: Negative for rash.   Neurological: Negative for syncope, speech difficulty, weakness, light-headedness and headaches.   Psychiatric/Behavioral: Negative for confusion, hallucinations and sleep disturbance.     Objective:     Vital Signs (Most Recent):  Temp: 98.4 °F (36.9 °C) (02/02/20 1520)  Pulse: (!) 59 (02/02/20 1520)  Resp: 20 (02/02/20 1520)  BP: 136/63 (02/02/20 1520)  SpO2: (!) 93 % (02/02/20 1520) Vital Signs (24h Range):  Temp:  [97.4 °F (36.3 °C)-99.6 °F (37.6 °C)] 98.4 °F (36.9 °C)  Pulse:  [48-61] 59  Resp:  [16-20] 20  SpO2:  [93 %-98 %] 93 %  BP: (136-221)/(62-98) 136/63     Weight: 77.6 kg (171 lb  1.2 oz)  Body mass index is 29.37 kg/m².    Intake/Output Summary (Last 24 hours) at 2/2/2020 1550  Last data filed at 2/2/2020 1200  Gross per 24 hour   Intake 840 ml   Output 250 ml   Net 590 ml      Physical Exam   Constitutional: She is oriented to person, place, and time. She appears well-developed and well-nourished. No distress.   HENT:   Head: Normocephalic and atraumatic.   Mouth/Throat: No oropharyngeal exudate.   Eyes: Pupils are equal, round, and reactive to light. Conjunctivae and EOM are normal.   Neck: Normal range of motion. Neck supple. No JVD present. No thyromegaly present.   Cardiovascular: Normal rate, regular rhythm and normal heart sounds.   No murmur heard.  Pulmonary/Chest: Effort normal and breath sounds normal. No respiratory distress. She has no wheezes. She has no rales. She exhibits no tenderness.   Abdominal: Soft. Bowel sounds are normal. She exhibits no distension. There is no tenderness. There is no rebound and no guarding.   Musculoskeletal: Normal range of motion. She exhibits no edema.   Lymphadenopathy:     She has no cervical adenopathy.   Neurological: She is alert and oriented to person, place, and time. She displays normal reflexes. No cranial nerve deficit or sensory deficit.   Skin: Skin is warm and dry. No rash noted. She is not diaphoretic.   Psychiatric: She has a normal mood and affect.       Significant Labs:   BMP:   Recent Labs   Lab 02/02/20  0402         K 4.1      CO2 25   BUN 28*   CREATININE 1.3   CALCIUM 9.5     CBC:   Recent Labs   Lab 02/01/20  1642   WBC 7.58   HGB 13.3   HCT 42.3        CMP:   Recent Labs   Lab 02/01/20  1642 02/02/20  0402    140   K 4.2 4.1    105   CO2 25 25    101   BUN 25* 28*   CREATININE 1.4 1.3   CALCIUM 9.9 9.5   PROT 7.4  --    ALBUMIN 4.0  --    BILITOT 0.6  --    ALKPHOS 79  --    AST 16  --    ALT 11  --    ANIONGAP 14 10   EGFRNONAA 34* 37*       Significant Imaging:        Assessment/Plan:      * Accelerated hypertension  - Patient with long-standing h/o uncontrolled HTN despite medication compliance.  - Will resume home Lopressor and clonidine (with HR parameters to prevent further bradycardia), losartan, and doxazosin.  Consider changing PO clonidine to transdermal patch to avoid rebound hypertension.   - Antihypertensive options are limited given intolerance to multiple medications.  Will add spironolactone for further BP control.  - Patient would benefit from sleep study outpatient.   2/2  Clonidine held   Added spironolactone and Imdur   Metoprolol tartrate resumed at 12.5 mg bid   Monitor BP trend     Sinus bradycardia  - Likely due to the multiple doses of clonidine and Lopressor patient took throughout the day today.  HR remains bradycardic, but stable.  Patient asymptomatic.   - Monitor telemetry.  - Will add HR parameters to Lopressor and clonidine to prevent further bradycardia.   2/2  We held Clonidine   Metoprolol tartrate resumed at 12.5 mg bid     Elevated troponin  - Likely due to #1.  - No angina, initial troponin minimally elevated at 0.03, EKG unrevealing.  - Trend serial CE's.  - ASA and beta blocker.  - Will consult Cardiology as needed.  2/2  Flat elevation and does not appear significant   Pt does not have chest pain   EKG- non specific ST  abnormality       VTE Risk Mitigation (From admission, onward)         Ordered     apixaban tablet 2.5 mg  2 times daily      02/01/20 2205     IP VTE HIGH RISK PATIENT  Once      02/01/20 2205     Place sequential compression device  Until discontinued      02/01/20 2205                      Levar Jacobo MD  Department of Hospital Medicine   [facility]

## 2020-02-02 NOTE — ASSESSMENT & PLAN NOTE
- Likely due to #1.  - No angina, initial troponin minimally elevated at 0.03, EKG unrevealing.  - Trend serial CE's.  - ASA and beta blocker.  - Will consult Cardiology as needed.

## 2020-02-02 NOTE — ASSESSMENT & PLAN NOTE
- Likely due to the multiple doses of clonidine and Lopressor patient took throughout the day today.  HR remains bradycardic, but stable.  Patient asymptomatic.   - Monitor telemetry.  - Will add HR parameters to Lopressor and clonidine to prevent further bradycardia.   2/2  We held Clonidine   Metoprolol tartrate resumed at 12.5 mg bid

## 2020-02-02 NOTE — PT/OT/SLP EVAL
Physical Therapy Evaluation and Discharge Note    Patient Name:  Shirley Metz   MRN:  [id number]    Recommendations:     Discharge Recommendations:  home   Discharge Equipment Recommendations: none   Barriers to discharge: None    Assessment:     Shirley Metz is a 87 y.o. female admitted with a medical diagnosis of Accelerated hypertension. .  At this time, patient is functioning at their prior level of function and does not require further acute PT services.     Recent Surgery: * No surgery found *      Plan:     During this hospitalization, patient does not require further acute PT services.  Please re-consult if situation changes.      Subjective     Chief Complaint: none  Patient/Family Comments/goals: to go home  Pain/Comfort:  · Pain Rating 1: 0/10    Patients cultural, spiritual, Holiness conflicts given the current situation:      Living Environment:  Lives at [facility] in indep apt  Prior to admission, patients level of function was mod indep with cane vs rollator.  Equipment used at home: rollator/spc/built in shower seat with grab bars in bathroom.  DME owned (not currently used): none.  Upon discharge, patient will have assistance from friends.    Objective:     Communicated with RN prior to session.  Patient found HOB elevated with telemetry upon PT entry to room.    General Precautions: Standard,     Orthopedic Precautions:N/A   Braces: N/A     Exams:  · B LE ROM WFL and strength at least 4+/5    Functional Mobility:  · All mobility spv or less; amb >150ft SPC no LOB    AM-PAC 6 CLICK MOBILITY  Total Score:21       Therapeutic Activities and Exercises:   PT educated patient on POC, B LE TE to prep mobility and safety precautions with mobility.    AM-PAC 6 CLICK MOBILITY  Total Score:21     Patient left HOB elevated with all lines intact, call button in reach, RN notified and friend present.    GOALS:   Multidisciplinary Problems     Physical Therapy Goals     Not on file                 History:     Past Medical History:   Diagnosis Date    Anemia 11/29/2018    Anxiety 11/28/2017    Arthritis     Atrial fibrillation with RVR 10/9/2019    Back pain     Basal cell carcinoma 03/29/2018    left mid back    Chronic diastolic congestive heart failure 10/15/2019    Colon polyps     reported per patient.     Fuchs' corneal dystrophy     General anesthetics causing adverse effect in therapeutic use     woke up during surgery and gagged on ET tube    Glaucoma     Glaucoma     Heart failure with preserved left ventricular function (HFpEF) 7/24/2018    Hyperlipidemia     Hypertension     Macular degeneration dry    Obesity     Squamous cell carcinoma 01/08/2019    left shoulder    Trouble in sleeping     Urinary tract infection        Past Surgical History:   Procedure Laterality Date    ADENOIDECTOMY  1938    BREAST BIOPSY Left     1997. benign    BREAST CYST EXCISION Left 1997 approx    CARPAL TUNNEL RELEASE Right 2012 approx    CATARACT EXTRACTION Bilateral 1994    CHOLECYSTECTOMY  1975    CORNEAL TRANSPLANT Bilateral 08/2015 8/2015 - left; Right 4/2016    EYE SURGERY      Fuch's Corneal dystrophy - had 2nd operation with Dr. Quintanilla    EYE SURGERY      Cataract wIOL    left thumb Left 2011    removed cuboid for arthritis    REVERSE TOTAL SHOULDER ARTHROPLASTY Left 8/21/2018    Procedure: ARTHROPLASTY, SHOULDER, TOTAL, REVERSE;  Surgeon: Solomon Lujan MD;  Location: [facility] OR;  Service: Orthopedics;  Laterality: Left;  WITH BICEP TENODESIS    SKIN CANCER EXCISION Left 2017    BCC removal by Dr. Valadez    SLT- OS (aka TRABECULOPLASTY) Left 05/11/2011    repeat 3/14/12    TENOTOMY Left 8/21/2018    Procedure: TENOTOMY;  Surgeon: Solomon Lujan MD;  Location: [facility] OR;  Service: Orthopedics;  Laterality: Left;    TONSILLECTOMY  1938    TREATMENT OF CARDIAC ARRHYTHMIA N/A 10/10/2019    Procedure: CARDIOVERSION;  Surgeon: Pete Durán MD;  Location: [facility]  LAB;  Service: Cardiology;  Laterality: N/A;    TREATMENT OF CARDIAC ARRHYTHMIA N/A 12/6/2019    Procedure: CARDIOVERSION;  Surgeon: Samy Castillo MD;  Location: [facility] CATH LAB;  Service: Cardiology;  Laterality: N/A;       Time Tracking:     PT Received On: 02/02/20  PT Start Time: 1355     PT Stop Time: 1420  PT Total Time (min): 25 min     Billable Minutes: Evaluation 15 and Therapeutic Activity 10      Jonathon Raymond, PT  02/02/2020

## 2020-02-02 NOTE — ASSESSMENT & PLAN NOTE
- Patient with long-standing h/o uncontrolled HTN despite medication compliance.  - Will resume home Lopressor and clonidine (with HR parameters to prevent further bradycardia), losartan, and doxazosin.  Consider changing PO clonidine to transdermal patch to avoid rebound hypertension.   - Antihypertensive options are limited given intolerance to multiple medications.  Will add spironolactone for further BP control.  - Patient would benefit from sleep study outpatient.   2/2  Clonidine held   Added spironolactone and Imdur   Metoprolol tartrate resumed at 12.5 mg bid   Monitor BP trend

## 2020-02-02 NOTE — HPI
"Ms. Bettencourt is an 88yo female with a PMHx of resistant HTN, HLD, metabolic syndrome, and CKD, who presented to the ED with c/o generalized weakness and fatigue x several weeks.  Associated elevated blood pressure.  No aggravating or alleviating factors.  She reports taking 10 clonidine pills throughout the day today with no improvement in BP.  Denies any CP, palpitations, SOB/RUIZ, orthopnea, PND, edema, cough, ABD pain, N/V, diaphoresis, lightheadedness/dizziness, syncope, visual disturbances, HA, AMS, or fever.  She states being compliant with all BP medications.  She has h/o intolerance to multiple BP medications due to side effects.  Upon arrival to ED, /95.  Initial work-up resulted troponin of 0.03, , and EKG unrevealing.  Patient was bradycardic in the ED, likely due to multiple doses of clonidine and beta-blocker today.  0.625 mg IV enalaprilat and 1" nitro paste given, which improved BP to 168/74.  Hospital Medicine was called for admission.    "

## 2020-02-02 NOTE — H&P
"[facility] Hospital Medicine  History & Physical    Patient Name: Shirley Metz  MRN: [id number]  Admission Date: 2/1/2020  Attending Physician: Tong Esposito MD   Primary Care Provider: Yandy Terrell MD         Patient information was obtained from patient, past medical records and ER records.     Subjective:     Principal Problem:Accelerated hypertension    Chief Complaint:   Chief Complaint   Patient presents with    Fatigue     elevated BP x weeks, weakness        HPI:  Ms. Bettencourt is an 86yo female with a PMHx of resistant HTN, HLD, metabolic syndrome, and CKD, who presented to the ED with c/o generalized weakness and fatigue x several weeks.  Associated elevated blood pressure.  No aggravating or alleviating factors.  She reports taking 10 clonidine pills throughout the day today with no improvement in BP.  Denies any CP, palpitations, SOB/RUIZ, orthopnea, PND, edema, cough, ABD pain, N/V, diaphoresis, lightheadedness/dizziness, syncope, visual disturbances, HA, AMS, or fever.  She states being compliant with all BP medications.  She has h/o intolerance to multiple BP medications due to side effects.  Upon arrival to ED, /95.  Initial work-up resulted troponin of 0.03, , and EKG unrevealing.  Patient was bradycardic in the ED, likely due to multiple doses of clonidine and beta-blocker today.  0.625 mg IV enalaprilat and 1" nitro paste given, which improved BP to 168/74.  Hospital Medicine was called for admission.      Past Medical History:   Diagnosis Date    Anemia 11/29/2018    Anxiety 11/28/2017    Arthritis     Atrial fibrillation with RVR 10/9/2019    Back pain     Basal cell carcinoma 03/29/2018    left mid back    Chronic diastolic congestive heart failure 10/15/2019    Colon polyps     reported per patient.     Fuchs' corneal dystrophy     General anesthetics causing adverse effect in therapeutic use     woke up during surgery and gagged on ET tube    " Glaucoma     Glaucoma     Heart failure with preserved left ventricular function (HFpEF) 7/24/2018    Hyperlipidemia     Hypertension     Macular degeneration dry    Obesity     Squamous cell carcinoma 01/08/2019    left shoulder    Trouble in sleeping     Urinary tract infection        Past Surgical History:   Procedure Laterality Date    ADENOIDECTOMY  1938    BREAST BIOPSY Left     1997. benign    BREAST CYST EXCISION Left 1997 approx    CARPAL TUNNEL RELEASE Right 2012 approx    CATARACT EXTRACTION Bilateral 1994    CHOLECYSTECTOMY  1975    CORNEAL TRANSPLANT Bilateral 08/2015 8/2015 - left; Right 4/2016    EYE SURGERY      Fuch's Corneal dystrophy - had 2nd operation with Dr. Quintanilla    EYE SURGERY      Cataract wIOL    left thumb Left 2011    removed cuboid for arthritis    REVERSE TOTAL SHOULDER ARTHROPLASTY Left 8/21/2018    Procedure: ARTHROPLASTY, SHOULDER, TOTAL, REVERSE;  Surgeon: Solomon Lujan MD;  Location: [facility] OR;  Service: Orthopedics;  Laterality: Left;  WITH BICEP TENODESIS    SKIN CANCER EXCISION Left 2017    BCC removal by Dr. Valadez    SLT- OS (aka TRABECULOPLASTY) Left 05/11/2011    repeat 3/14/12    TENOTOMY Left 8/21/2018    Procedure: TENOTOMY;  Surgeon: Solomon Lujan MD;  Location: [facility] OR;  Service: Orthopedics;  Laterality: Left;    TONSILLECTOMY  1938    TREATMENT OF CARDIAC ARRHYTHMIA N/A 10/10/2019    Procedure: CARDIOVERSION;  Surgeon: Pete Durán MD;  Location: [facility] CATH LAB;  Service: Cardiology;  Laterality: N/A;    TREATMENT OF CARDIAC ARRHYTHMIA N/A 12/6/2019    Procedure: CARDIOVERSION;  Surgeon: Samy Castillo MD;  Location: [facility] CATH LAB;  Service: Cardiology;  Laterality: N/A;       Review of patient's allergies indicates:   Allergen Reactions    Claritin [loratadine] Other (See Comments)     Double vision/spots    Hydrocodone-acetaminophen      wheezing    Labetalol Shortness Of Breath, Nausea Only and Other (See Comments)      dizziness    Naproxen      wheezing    Verapamil Diarrhea    Vibramycin [doxycycline calcium] Other (See Comments)     Weakness nausea   sob    Amlodipine      Myalgias      Coreg [carvedilol] Swelling     lips    Fenofibrate      Causes muscle weakness    Hydralazine analogues      Muscle tremors/aches      Isosorbide     Lasix [furosemide]      myalgias    Moxifloxacin Other (See Comments)     Hives   muscle soreness    Timolol     Adhesive Rash     Clonidine patch - 2 mfg - contact dermatitis    Allegra [fexofenadine] Other (See Comments)     Double vision/spots     Codeine Diarrhea and Other (See Comments)     Loss of balance     Erythromycin Other (See Comments)     Complete weakness/could not walk    Hydrocortisone (bulk) Other (See Comments)     Only suppository/ caused muscle weakness    Macrolide antibiotics Other (See Comments)     Complete weakness/could not walk    Patanol [olopatadine] Other (See Comments)     Muscle weakness    Prednisone Anxiety    Statins-hmg-coa reductase inhibitors Other (See Comments)     Muscle weakness    Xalatan [latanoprost] Other (See Comments)     Muscle weakness       Current Facility-Administered Medications on File Prior to Encounter   Medication    sodium chloride 0.9% flush 3 mL     Current Outpatient Medications on File Prior to Encounter   Medication Sig    acetaminophen (TYLENOL) 500 MG tablet Take 500 mg by mouth daily as needed. Taking 1 to 3    ALPRAZolam (XANAX) 0.5 MG tablet TAKE 1/2 - 1 TABLET BY MOUTH NIGHTLY FOR SLEEP OR ANXIETY    apixaban (ELIQUIS) 2.5 mg Tab Take 1 tablet (2.5 mg total) by mouth 2 (two) times daily.    cholecalciferol, vitamin D3, (VITAMIN D3) 2,000 unit Cap Take 1 capsule by mouth once daily.    cloNIDine (CATAPRES) 0.1 MG tablet Take 2 tablets (0.2 mg total) by mouth 3 (three) times daily.    coenzyme Q10 200 mg capsule Take 400 mg by mouth once daily.     doxazosin (CARDURA) 4 MG tablet Take 1 tablet (4 mg  total) by mouth every evening.    fish oil-omega-3 fatty acids 300-1,000 mg capsule Take 2 g by mouth 2 (two) times daily.     losartan (COZAAR) 50 MG tablet Take 2 tablets (100 mg total) by mouth once daily.    LOTEMAX 0.5 % DrpG     metoprolol tartrate (LOPRESSOR) 25 MG tablet Take 1 tablet (25 mg total) by mouth 2 (two) times daily.    POLYETHYLENE GLYCOL 3350 (MIRALAX ORAL) Take by mouth as needed. Taking BID    RHOPRESSA 0.02 % Drop once daily.     TRAVATAN Z 0.004 % ophthalmic solution PLACE 1 DROP INTO THE LEFT EYE EVERY EVENING.    bumetanide (BUMEX) 0.5 MG Tab Take 1 tablet (0.5 mg total) by mouth every Mon, Wed, Fri, Sun.     Family History     Problem Relation (Age of Onset)    Cancer Father (88)    Heart disease Mother    Hypertension Mother        Tobacco Use    Smoking status: Never Smoker    Smokeless tobacco: Never Used    Tobacco comment: history of passive smoking from her ex-   Substance and Sexual Activity    Alcohol use: Not Currently     Alcohol/week: 2.0 standard drinks     Types: 2 Standard drinks or equivalent per week     Frequency: Monthly or less     Drinks per session: 1 or 2     Binge frequency: Never     Comment: occasional     Drug use: No    Sexual activity: Not Currently     Partners: Male     Birth control/protection: Post-menopausal     Comment: discussed protection for STD's     Review of Systems   Constitutional: Positive for fatigue. Negative for chills, diaphoresis, fever and unexpected weight change.   HENT: Negative for congestion.    Eyes: Negative for visual disturbance.   Respiratory: Negative for cough, chest tightness, shortness of breath and wheezing.    Cardiovascular: Negative for chest pain, palpitations and leg swelling.   Gastrointestinal: Negative for abdominal pain, diarrhea, nausea and vomiting.   Musculoskeletal: Negative for back pain.   Skin: Negative for pallor and rash.   Neurological: Positive for weakness (generalized). Negative for  dizziness, syncope, facial asymmetry, speech difficulty, light-headedness, numbness and headaches.   Psychiatric/Behavioral: Negative for confusion. The patient is not nervous/anxious.    All other systems reviewed and are negative.    Objective:     Vital Signs (Most Recent):  Temp: 97.9 °F (36.6 °C) (02/01/20 2139)  Pulse: (!) 53 (02/01/20 2139)  Resp: 19 (02/01/20 2139)  BP: (!) 168/74 (02/01/20 2139)  SpO2: 96 % (02/01/20 2139) Vital Signs (24h Range):  Temp:  [97.9 °F (36.6 °C)-98.5 °F (36.9 °C)] 97.9 °F (36.6 °C)  Pulse:  [48-58] 53  Resp:  [16-20] 19  SpO2:  [95 %-98 %] 96 %  BP: (144-221)/(62-98) 168/74     Weight: 77.8 kg (171 lb 8.3 oz)  Body mass index is 29.44 kg/m².    Physical Exam   Constitutional: She is oriented to person, place, and time. She appears well-developed and well-nourished. No distress.   HENT:   Head: Normocephalic and atraumatic.   Eyes: Conjunctivae are normal.   PERRL; EOM intact.   Neck: Normal range of motion. Neck supple. No JVD present.   Cardiovascular: Regular rhythm, S1 normal, S2 normal and intact distal pulses.  No extrasystoles are present. Bradycardia present. Exam reveals no gallop and no friction rub.   No murmur heard.  Pulses:       Radial pulses are 2+ on the right side, and 2+ on the left side.        Dorsalis pedis pulses are 2+ on the right side, and 2+ on the left side.        Posterior tibial pulses are 2+ on the right side, and 2+ on the left side.   Pulmonary/Chest: Effort normal and breath sounds normal. No accessory muscle usage. No tachypnea. No respiratory distress. She has no wheezes. She has no rhonchi. She has no rales.   Abdominal: Soft. Bowel sounds are normal. She exhibits no distension. There is no tenderness. There is no rebound, no guarding and no CVA tenderness.   Musculoskeletal: Normal range of motion. She exhibits no edema, tenderness or deformity.   Neurological: She is alert and oriented to person, place, and time. No cranial nerve deficit or  sensory deficit.   Skin: Skin is warm, dry and intact. Capillary refill takes less than 2 seconds. No rash noted. She is not diaphoretic. No cyanosis or erythema.   Psychiatric: She has a normal mood and affect. Her speech is normal and behavior is normal. Cognition and memory are normal.   Nursing note and vitals reviewed.          Significant Labs:  Results for orders placed or performed during the hospital encounter of 02/01/20   CBC auto differential   Result Value Ref Range    WBC 7.58 3.90 - 12.70 K/uL    RBC 4.29 4.00 - 5.40 M/uL    Hemoglobin 13.3 12.0 - 16.0 g/dL    Hematocrit 42.3 37.0 - 48.5 %    Mean Corpuscular Volume 99 (H) 82 - 98 fL    Mean Corpuscular Hemoglobin 31.0 27.0 - 31.0 pg    Mean Corpuscular Hemoglobin Conc 31.4 (L) 32.0 - 36.0 g/dL    RDW 13.2 11.5 - 14.5 %    Platelets 202 150 - 350 K/uL    MPV 11.3 9.2 - 12.9 fL    Immature Granulocytes 0.3 0.0 - 0.5 %    Gran # (ANC) 4.7 1.8 - 7.7 K/uL    Immature Grans (Abs) 0.02 0.00 - 0.04 K/uL    Lymph # 2.0 1.0 - 4.8 K/uL    Mono # 0.7 0.3 - 1.0 K/uL    Eos # 0.2 0.0 - 0.5 K/uL    Baso # 0.03 0.00 - 0.20 K/uL    nRBC 0 0 /100 WBC    Gran% 61.4 38.0 - 73.0 %    Lymph% 26.0 18.0 - 48.0 %    Mono% 9.8 4.0 - 15.0 %    Eosinophil% 2.1 0.0 - 8.0 %    Basophil% 0.4 0.0 - 1.9 %    Differential Method Automated    Comprehensive metabolic panel   Result Value Ref Range    Sodium 141 136 - 145 mmol/L    Potassium 4.2 3.5 - 5.1 mmol/L    Chloride 102 95 - 110 mmol/L    CO2 25 23 - 29 mmol/L    Glucose 106 70 - 110 mg/dL    BUN, Bld 25 (H) 8 - 23 mg/dL    Creatinine 1.4 0.5 - 1.4 mg/dL    Calcium 9.9 8.7 - 10.5 mg/dL    Total Protein 7.4 6.0 - 8.4 g/dL    Albumin 4.0 3.5 - 5.2 g/dL    Total Bilirubin 0.6 0.1 - 1.0 mg/dL    Alkaline Phosphatase 79 55 - 135 U/L    AST 16 10 - 40 U/L    ALT 11 10 - 44 U/L    Anion Gap 14 8 - 16 mmol/L    eGFR if African American 39 (A) >60 mL/min/1.73 m^2    eGFR if non African American 34 (A) >60 mL/min/1.73 m^2   Troponin I #1    Result Value Ref Range    Troponin I 0.030 (H) 0.000 - 0.026 ng/mL   B-Type natriuretic peptide (BNP)   Result Value Ref Range     (H) 0 - 99 pg/mL      All pertinent labs within the past 24 hours have been reviewed.    Significant Imaging:  Imaging Results    None        I have reviewed all pertinent imaging results/findings within the past 24 hours.     EKG: (personally reviewed)  Sinus bradycardia, nonspecific ST abnormality, no acute ischemic ST-T changes.  No significant change from previous tracings.             Assessment/Plan:     * Accelerated hypertension  - Patient with long-standing h/o uncontrolled HTN despite medication compliance.  - Will resume home Lopressor and clonidine (with HR parameters to prevent further bradycardia), losartan, and doxazosin.  Consider changing PO clonidine to transdermal patch to avoid rebound hypertension.   - Antihypertensive options are limited given intolerance to multiple medications.  Will add spironolactone for further BP control.  - Patient would benefit from sleep study outpatient.     Elevated troponin  - Likely due to #1.  - No angina, initial troponin minimally elevated at 0.03, EKG unrevealing.  - Trend serial CE's.  - ASA and beta blocker.  - Will consult Cardiology as needed.    Sinus bradycardia  - Likely due to the multiple doses of clonidine and Lopressor patient took throughout the day today.  HR remains bradycardic, but stable.  Patient asymptomatic.   - Monitor telemetry.  - Will add HR parameters to Lopressor and clonidine to prevent further bradycardia.       VTE Risk Mitigation (From admission, onward)         Ordered     apixaban tablet 2.5 mg  2 times daily      02/01/20 2205     IP VTE HIGH RISK PATIENT  Once      02/01/20 2205     Place sequential compression device  Until discontinued      02/01/20 2205                   Tracie Lancaster NP  Department of Hospital Medicine   [facility]

## 2020-02-02 NOTE — SUBJECTIVE & OBJECTIVE
Past Medical History:   Diagnosis Date    Anemia 11/29/2018    Anxiety 11/28/2017    Arthritis     Atrial fibrillation with RVR 10/9/2019    Back pain     Basal cell carcinoma 03/29/2018    left mid back    Chronic diastolic congestive heart failure 10/15/2019    Colon polyps     reported per patient.     Fuchs' corneal dystrophy     General anesthetics causing adverse effect in therapeutic use     woke up during surgery and gagged on ET tube    Glaucoma     Glaucoma     Heart failure with preserved left ventricular function (HFpEF) 7/24/2018    Hyperlipidemia     Hypertension     Macular degeneration dry    Obesity     Squamous cell carcinoma 01/08/2019    left shoulder    Trouble in sleeping     Urinary tract infection        Past Surgical History:   Procedure Laterality Date    ADENOIDECTOMY  1938    BREAST BIOPSY Left     1997. benign    BREAST CYST EXCISION Left 1997 approx    CARPAL TUNNEL RELEASE Right 2012 approx    CATARACT EXTRACTION Bilateral 1994    CHOLECYSTECTOMY  1975    CORNEAL TRANSPLANT Bilateral 08/2015 8/2015 - left; Right 4/2016    EYE SURGERY      Fuch's Corneal dystrophy - had 2nd operation with Dr. Quintanilla    EYE SURGERY      Cataract wIOL    left thumb Left 2011    removed cuboid for arthritis    REVERSE TOTAL SHOULDER ARTHROPLASTY Left 8/21/2018    Procedure: ARTHROPLASTY, SHOULDER, TOTAL, REVERSE;  Surgeon: Solomon Lujan MD;  Location: [facility] OR;  Service: Orthopedics;  Laterality: Left;  WITH BICEP TENODESIS    SKIN CANCER EXCISION Left 2017    BCC removal by Dr. Valadez    SLT- OS (aka TRABECULOPLASTY) Left 05/11/2011    repeat 3/14/12    TENOTOMY Left 8/21/2018    Procedure: TENOTOMY;  Surgeon: Solomon Lujan MD;  Location: [facility] OR;  Service: Orthopedics;  Laterality: Left;    TONSILLECTOMY  1938    TREATMENT OF CARDIAC ARRHYTHMIA N/A 10/10/2019    Procedure: CARDIOVERSION;  Surgeon: Pete Durán MD;  Location: [facility] CATH LAB;  Service:  Cardiology;  Laterality: N/A;    TREATMENT OF CARDIAC ARRHYTHMIA N/A 12/6/2019    Procedure: CARDIOVERSION;  Surgeon: Samy Castillo MD;  Location: [facility] CATH LAB;  Service: Cardiology;  Laterality: N/A;       Review of patient's allergies indicates:   Allergen Reactions    Claritin [loratadine] Other (See Comments)     Double vision/spots    Hydrocodone-acetaminophen      wheezing    Labetalol Shortness Of Breath, Nausea Only and Other (See Comments)     dizziness    Naproxen      wheezing    Verapamil Diarrhea    Vibramycin [doxycycline calcium] Other (See Comments)     Weakness nausea   sob    Amlodipine      Myalgias      Coreg [carvedilol] Swelling     lips    Fenofibrate      Causes muscle weakness    Hydralazine analogues      Muscle tremors/aches      Isosorbide     Lasix [furosemide]      myalgias    Moxifloxacin Other (See Comments)     Hives   muscle soreness    Timolol     Adhesive Rash     Clonidine patch - 2 mfg - contact dermatitis    Allegra [fexofenadine] Other (See Comments)     Double vision/spots     Codeine Diarrhea and Other (See Comments)     Loss of balance     Erythromycin Other (See Comments)     Complete weakness/could not walk    Hydrocortisone (bulk) Other (See Comments)     Only suppository/ caused muscle weakness    Macrolide antibiotics Other (See Comments)     Complete weakness/could not walk    Patanol [olopatadine] Other (See Comments)     Muscle weakness    Prednisone Anxiety    Statins-hmg-coa reductase inhibitors Other (See Comments)     Muscle weakness    Xalatan [latanoprost] Other (See Comments)     Muscle weakness       Current Facility-Administered Medications on File Prior to Encounter   Medication    sodium chloride 0.9% flush 3 mL     Current Outpatient Medications on File Prior to Encounter   Medication Sig    acetaminophen (TYLENOL) 500 MG tablet Take 500 mg by mouth daily as needed. Taking 1 to 3    ALPRAZolam (XANAX) 0.5 MG tablet TAKE 1/2 - 1  TABLET BY MOUTH NIGHTLY FOR SLEEP OR ANXIETY    apixaban (ELIQUIS) 2.5 mg Tab Take 1 tablet (2.5 mg total) by mouth 2 (two) times daily.    cholecalciferol, vitamin D3, (VITAMIN D3) 2,000 unit Cap Take 1 capsule by mouth once daily.    cloNIDine (CATAPRES) 0.1 MG tablet Take 2 tablets (0.2 mg total) by mouth 3 (three) times daily.    coenzyme Q10 200 mg capsule Take 400 mg by mouth once daily.     doxazosin (CARDURA) 4 MG tablet Take 1 tablet (4 mg total) by mouth every evening.    fish oil-omega-3 fatty acids 300-1,000 mg capsule Take 2 g by mouth 2 (two) times daily.     losartan (COZAAR) 50 MG tablet Take 2 tablets (100 mg total) by mouth once daily.    LOTEMAX 0.5 % DrpG     metoprolol tartrate (LOPRESSOR) 25 MG tablet Take 1 tablet (25 mg total) by mouth 2 (two) times daily.    POLYETHYLENE GLYCOL 3350 (MIRALAX ORAL) Take by mouth as needed. Taking BID    RHOPRESSA 0.02 % Drop once daily.     TRAVATAN Z 0.004 % ophthalmic solution PLACE 1 DROP INTO THE LEFT EYE EVERY EVENING.    bumetanide (BUMEX) 0.5 MG Tab Take 1 tablet (0.5 mg total) by mouth every Mon, Wed, Fri, Sun.     Family History     Problem Relation (Age of Onset)    Cancer Father (88)    Heart disease Mother    Hypertension Mother        Tobacco Use    Smoking status: Never Smoker    Smokeless tobacco: Never Used    Tobacco comment: history of passive smoking from her ex-   Substance and Sexual Activity    Alcohol use: Not Currently     Alcohol/week: 2.0 standard drinks     Types: 2 Standard drinks or equivalent per week     Frequency: Monthly or less     Drinks per session: 1 or 2     Binge frequency: Never     Comment: occasional     Drug use: No    Sexual activity: Not Currently     Partners: Male     Birth control/protection: Post-menopausal     Comment: discussed protection for STD's     Review of Systems   Constitutional: Positive for fatigue. Negative for chills, diaphoresis, fever and unexpected weight change.    HENT: Negative for congestion.    Eyes: Negative for visual disturbance.   Respiratory: Negative for cough, chest tightness, shortness of breath and wheezing.    Cardiovascular: Negative for chest pain, palpitations and leg swelling.   Gastrointestinal: Negative for abdominal pain, diarrhea, nausea and vomiting.   Musculoskeletal: Negative for back pain.   Skin: Negative for pallor and rash.   Neurological: Positive for weakness (generalized). Negative for dizziness, syncope, facial asymmetry, speech difficulty, light-headedness, numbness and headaches.   Psychiatric/Behavioral: Negative for confusion. The patient is not nervous/anxious.    All other systems reviewed and are negative.    Objective:     Vital Signs (Most Recent):  Temp: 97.9 °F (36.6 °C) (02/01/20 2139)  Pulse: (!) 53 (02/01/20 2139)  Resp: 19 (02/01/20 2139)  BP: (!) 168/74 (02/01/20 2139)  SpO2: 96 % (02/01/20 2139) Vital Signs (24h Range):  Temp:  [97.9 °F (36.6 °C)-98.5 °F (36.9 °C)] 97.9 °F (36.6 °C)  Pulse:  [48-58] 53  Resp:  [16-20] 19  SpO2:  [95 %-98 %] 96 %  BP: (144-221)/(62-98) 168/74     Weight: 77.8 kg (171 lb 8.3 oz)  Body mass index is 29.44 kg/m².    Physical Exam   Constitutional: She is oriented to person, place, and time. She appears well-developed and well-nourished. No distress.   HENT:   Head: Normocephalic and atraumatic.   Eyes: Conjunctivae are normal.   PERRL; EOM intact.   Neck: Normal range of motion. Neck supple. No JVD present.   Cardiovascular: Regular rhythm, S1 normal, S2 normal and intact distal pulses.  No extrasystoles are present. Bradycardia present. Exam reveals no gallop and no friction rub.   No murmur heard.  Pulses:       Radial pulses are 2+ on the right side, and 2+ on the left side.        Dorsalis pedis pulses are 2+ on the right side, and 2+ on the left side.        Posterior tibial pulses are 2+ on the right side, and 2+ on the left side.   Pulmonary/Chest: Effort normal and breath sounds normal.  No accessory muscle usage. No tachypnea. No respiratory distress. She has no wheezes. She has no rhonchi. She has no rales.   Abdominal: Soft. Bowel sounds are normal. She exhibits no distension. There is no tenderness. There is no rebound, no guarding and no CVA tenderness.   Musculoskeletal: Normal range of motion. She exhibits no edema, tenderness or deformity.   Neurological: She is alert and oriented to person, place, and time. No cranial nerve deficit or sensory deficit.   Skin: Skin is warm, dry and intact. Capillary refill takes less than 2 seconds. No rash noted. She is not diaphoretic. No cyanosis or erythema.   Psychiatric: She has a normal mood and affect. Her speech is normal and behavior is normal. Cognition and memory are normal.   Nursing note and vitals reviewed.          Significant Labs:  Results for orders placed or performed during the hospital encounter of 02/01/20   CBC auto differential   Result Value Ref Range    WBC 7.58 3.90 - 12.70 K/uL    RBC 4.29 4.00 - 5.40 M/uL    Hemoglobin 13.3 12.0 - 16.0 g/dL    Hematocrit 42.3 37.0 - 48.5 %    Mean Corpuscular Volume 99 (H) 82 - 98 fL    Mean Corpuscular Hemoglobin 31.0 27.0 - 31.0 pg    Mean Corpuscular Hemoglobin Conc 31.4 (L) 32.0 - 36.0 g/dL    RDW 13.2 11.5 - 14.5 %    Platelets 202 150 - 350 K/uL    MPV 11.3 9.2 - 12.9 fL    Immature Granulocytes 0.3 0.0 - 0.5 %    Gran # (ANC) 4.7 1.8 - 7.7 K/uL    Immature Grans (Abs) 0.02 0.00 - 0.04 K/uL    Lymph # 2.0 1.0 - 4.8 K/uL    Mono # 0.7 0.3 - 1.0 K/uL    Eos # 0.2 0.0 - 0.5 K/uL    Baso # 0.03 0.00 - 0.20 K/uL    nRBC 0 0 /100 WBC    Gran% 61.4 38.0 - 73.0 %    Lymph% 26.0 18.0 - 48.0 %    Mono% 9.8 4.0 - 15.0 %    Eosinophil% 2.1 0.0 - 8.0 %    Basophil% 0.4 0.0 - 1.9 %    Differential Method Automated    Comprehensive metabolic panel   Result Value Ref Range    Sodium 141 136 - 145 mmol/L    Potassium 4.2 3.5 - 5.1 mmol/L    Chloride 102 95 - 110 mmol/L    CO2 25 23 - 29 mmol/L    Glucose  106 70 - 110 mg/dL    BUN, Bld 25 (H) 8 - 23 mg/dL    Creatinine 1.4 0.5 - 1.4 mg/dL    Calcium 9.9 8.7 - 10.5 mg/dL    Total Protein 7.4 6.0 - 8.4 g/dL    Albumin 4.0 3.5 - 5.2 g/dL    Total Bilirubin 0.6 0.1 - 1.0 mg/dL    Alkaline Phosphatase 79 55 - 135 U/L    AST 16 10 - 40 U/L    ALT 11 10 - 44 U/L    Anion Gap 14 8 - 16 mmol/L    eGFR if African American 39 (A) >60 mL/min/1.73 m^2    eGFR if non African American 34 (A) >60 mL/min/1.73 m^2   Troponin I #1   Result Value Ref Range    Troponin I 0.030 (H) 0.000 - 0.026 ng/mL   B-Type natriuretic peptide (BNP)   Result Value Ref Range     (H) 0 - 99 pg/mL      All pertinent labs within the past 24 hours have been reviewed.    Significant Imaging:  Imaging Results    None        I have reviewed all pertinent imaging results/findings within the past 24 hours.     EKG: (personally reviewed)  Sinus bradycardia, nonspecific ST abnormality, no acute ischemic ST-T changes.  No significant change from previous tracings.

## 2020-02-02 NOTE — ASSESSMENT & PLAN NOTE
- Patient with long-standing h/o uncontrolled HTN despite medication compliance.  - Will resume home Lopressor and clonidine (with HR parameters to prevent further bradycardia), losartan, and doxazosin.  Consider changing PO clonidine to transdermal patch to avoid rebound hypertension.   - Antihypertensive options are limited given intolerance to multiple medications.  Will add spironolactone for further BP control.  - Patient would benefit from sleep study outpatient.

## 2020-02-02 NOTE — SUBJECTIVE & OBJECTIVE
Interval History:     Admitted overnight for evaluation of markedly elevated blood pressure . No evidence of acute end organ damage . Renal function preserved . Cardiac troponin is marginally elevated and does not appears significant. BNP is elevated noted. Echo in 10/2019 resulted concentric hypertrophy , normal systolic and diastolic function and estimated  PA  pressure 40.     Clonidine was not given due to persistent bradycardia and risk of rebound HTN.  Metoprolol resumed at 12.5 mg bid   Added  Spironolactone 25 mg and Imdur 30 mg and pt seems to be tolerating and responding   Will monitor BP trend   Possible D/C in am     Review of Systems   Constitutional: Negative for activity change, appetite change and fever.   HENT: Negative for sore throat.    Eyes: Negative for visual disturbance.   Respiratory: Negative for cough, chest tightness and shortness of breath.    Cardiovascular: Negative for chest pain, palpitations and leg swelling.   Gastrointestinal: Negative for abdominal distention, abdominal pain, constipation, diarrhea, nausea and vomiting.   Endocrine: Negative for polyuria.   Genitourinary: Negative for decreased urine volume, dysuria, flank pain, frequency and hematuria.   Musculoskeletal: Negative for back pain and gait problem.   Skin: Negative for rash.   Neurological: Negative for syncope, speech difficulty, weakness, light-headedness and headaches.   Psychiatric/Behavioral: Negative for confusion, hallucinations and sleep disturbance.     Objective:     Vital Signs (Most Recent):  Temp: 98.4 °F (36.9 °C) (02/02/20 1520)  Pulse: (!) 59 (02/02/20 1520)  Resp: 20 (02/02/20 1520)  BP: 136/63 (02/02/20 1520)  SpO2: (!) 93 % (02/02/20 1520) Vital Signs (24h Range):  Temp:  [97.4 °F (36.3 °C)-99.6 °F (37.6 °C)] 98.4 °F (36.9 °C)  Pulse:  [48-61] 59  Resp:  [16-20] 20  SpO2:  [93 %-98 %] 93 %  BP: (136-221)/(62-98) 136/63     Weight: 77.6 kg (171 lb 1.2 oz)  Body mass index is 29.37  kg/m².    Intake/Output Summary (Last 24 hours) at 2/2/2020 1550  Last data filed at 2/2/2020 1200  Gross per 24 hour   Intake 840 ml   Output 250 ml   Net 590 ml      Physical Exam   Constitutional: She is oriented to person, place, and time. She appears well-developed and well-nourished. No distress.   HENT:   Head: Normocephalic and atraumatic.   Mouth/Throat: No oropharyngeal exudate.   Eyes: Pupils are equal, round, and reactive to light. Conjunctivae and EOM are normal.   Neck: Normal range of motion. Neck supple. No JVD present. No thyromegaly present.   Cardiovascular: Normal rate, regular rhythm and normal heart sounds.   No murmur heard.  Pulmonary/Chest: Effort normal and breath sounds normal. No respiratory distress. She has no wheezes. She has no rales. She exhibits no tenderness.   Abdominal: Soft. Bowel sounds are normal. She exhibits no distension. There is no tenderness. There is no rebound and no guarding.   Musculoskeletal: Normal range of motion. She exhibits no edema.   Lymphadenopathy:     She has no cervical adenopathy.   Neurological: She is alert and oriented to person, place, and time. She displays normal reflexes. No cranial nerve deficit or sensory deficit.   Skin: Skin is warm and dry. No rash noted. She is not diaphoretic.   Psychiatric: She has a normal mood and affect.       Significant Labs:   BMP:   Recent Labs   Lab 02/02/20  0402         K 4.1      CO2 25   BUN 28*   CREATININE 1.3   CALCIUM 9.5     CBC:   Recent Labs   Lab 02/01/20  1642   WBC 7.58   HGB 13.3   HCT 42.3        CMP:   Recent Labs   Lab 02/01/20  1642 02/02/20  0402    140   K 4.2 4.1    105   CO2 25 25    101   BUN 25* 28*   CREATININE 1.4 1.3   CALCIUM 9.9 9.5   PROT 7.4  --    ALBUMIN 4.0  --    BILITOT 0.6  --    ALKPHOS 79  --    AST 16  --    ALT 11  --    ANIONGAP 14 10   EGFRNONAA 34* 37*       Significant Imaging:

## 2020-02-02 NOTE — PLAN OF CARE
Pt c/o of HTN.   Interventions performed: cardiac monitoring continued, labs monitored, medications adjusted, encouraged slow position changes, activity increased as tolerated, BPs monitored closely.  Mobility status: Independent, minimal assist.   Patient SB on monitor.   Safety maintained per pt.  Pt aware of POC.  Will continue to monitor.

## 2020-02-02 NOTE — ASSESSMENT & PLAN NOTE
- Likely due to the multiple doses of clonidine and Lopressor patient took throughout the day today.  HR remains bradycardic, but stable.  Patient asymptomatic.   - Monitor telemetry.  - Will add HR parameters to Lopressor and clonidine to prevent further bradycardia.

## 2020-02-03 ENCOUNTER — TELEPHONE (OUTPATIENT)
Dept: INTERNAL MEDICINE | Facility: CLINIC | Age: 85
End: 2020-02-03

## 2020-02-03 VITALS
BODY MASS INDEX: 28.98 KG/M2 | RESPIRATION RATE: 20 BRPM | HEART RATE: 62 BPM | WEIGHT: 169.75 LBS | SYSTOLIC BLOOD PRESSURE: 145 MMHG | TEMPERATURE: 98 F | OXYGEN SATURATION: 96 % | HEIGHT: 64 IN | DIASTOLIC BLOOD PRESSURE: 63 MMHG

## 2020-02-03 PROBLEM — R00.1 SINUS BRADYCARDIA: Status: RESOLVED | Noted: 2020-02-02 | Resolved: 2020-02-03

## 2020-02-03 PROBLEM — I10 ACCELERATED HYPERTENSION: Status: RESOLVED | Noted: 2018-03-16 | Resolved: 2020-02-03

## 2020-02-03 PROBLEM — R79.89 ELEVATED TROPONIN: Status: RESOLVED | Noted: 2018-03-15 | Resolved: 2020-02-03

## 2020-02-03 LAB
ANION GAP SERPL CALC-SCNC: 9 MMOL/L (ref 8–16)
BASOPHILS # BLD AUTO: 0.03 K/UL (ref 0–0.2)
BASOPHILS NFR BLD: 0.5 % (ref 0–1.9)
BUN SERPL-MCNC: 32 MG/DL (ref 8–23)
CALCIUM SERPL-MCNC: 9.3 MG/DL (ref 8.7–10.5)
CHLORIDE SERPL-SCNC: 105 MMOL/L (ref 95–110)
CO2 SERPL-SCNC: 25 MMOL/L (ref 23–29)
CREAT SERPL-MCNC: 1.5 MG/DL (ref 0.5–1.4)
DIFFERENTIAL METHOD: ABNORMAL
EOSINOPHIL # BLD AUTO: 0.2 K/UL (ref 0–0.5)
EOSINOPHIL NFR BLD: 2.3 % (ref 0–8)
ERYTHROCYTE [DISTWIDTH] IN BLOOD BY AUTOMATED COUNT: 13.3 % (ref 11.5–14.5)
EST. GFR  (AFRICAN AMERICAN): 36 ML/MIN/1.73 M^2
EST. GFR  (NON AFRICAN AMERICAN): 31 ML/MIN/1.73 M^2
GLUCOSE SERPL-MCNC: 102 MG/DL (ref 70–110)
HCT VFR BLD AUTO: 36.5 % (ref 37–48.5)
HGB BLD-MCNC: 11.7 G/DL (ref 12–16)
IMM GRANULOCYTES # BLD AUTO: 0.02 K/UL (ref 0–0.04)
IMM GRANULOCYTES NFR BLD AUTO: 0.3 % (ref 0–0.5)
LYMPHOCYTES # BLD AUTO: 1.6 K/UL (ref 1–4.8)
LYMPHOCYTES NFR BLD: 24.7 % (ref 18–48)
MAGNESIUM SERPL-MCNC: 2 MG/DL (ref 1.6–2.6)
MCH RBC QN AUTO: 31.3 PG (ref 27–31)
MCHC RBC AUTO-ENTMCNC: 32.1 G/DL (ref 32–36)
MCV RBC AUTO: 98 FL (ref 82–98)
MONOCYTES # BLD AUTO: 0.9 K/UL (ref 0.3–1)
MONOCYTES NFR BLD: 13.9 % (ref 4–15)
NEUTROPHILS # BLD AUTO: 3.7 K/UL (ref 1.8–7.7)
NEUTROPHILS NFR BLD: 58.3 % (ref 38–73)
NRBC BLD-RTO: 0 /100 WBC
PLATELET # BLD AUTO: 159 K/UL (ref 150–350)
PMV BLD AUTO: 10.7 FL (ref 9.2–12.9)
POTASSIUM SERPL-SCNC: 3.9 MMOL/L (ref 3.5–5.1)
RBC # BLD AUTO: 3.74 M/UL (ref 4–5.4)
SODIUM SERPL-SCNC: 139 MMOL/L (ref 136–145)
WBC # BLD AUTO: 6.39 K/UL (ref 3.9–12.7)

## 2020-02-03 PROCEDURE — 85025 COMPLETE CBC W/AUTO DIFF WBC: CPT | Mod: HCNC

## 2020-02-03 PROCEDURE — 36415 COLL VENOUS BLD VENIPUNCTURE: CPT | Mod: HCNC

## 2020-02-03 PROCEDURE — 83735 ASSAY OF MAGNESIUM: CPT | Mod: HCNC

## 2020-02-03 PROCEDURE — G0378 HOSPITAL OBSERVATION PER HR: HCPCS | Mod: HCNC

## 2020-02-03 PROCEDURE — 25000003 PHARM REV CODE 250: Mod: HCNC | Performed by: INTERNAL MEDICINE

## 2020-02-03 PROCEDURE — 25000003 PHARM REV CODE 250: Mod: HCNC | Performed by: FAMILY MEDICINE

## 2020-02-03 PROCEDURE — 80048 BASIC METABOLIC PNL TOTAL CA: CPT | Mod: HCNC

## 2020-02-03 RX ORDER — ISOSORBIDE MONONITRATE 30 MG/1
30 TABLET, EXTENDED RELEASE ORAL DAILY
Qty: 30 TABLET | Refills: 0 | Status: SHIPPED | OUTPATIENT
Start: 2020-02-04 | End: 2020-02-17 | Stop reason: SDUPTHER

## 2020-02-03 RX ORDER — CLONIDINE HYDROCHLORIDE 0.1 MG/1
0.2 TABLET ORAL 3 TIMES DAILY PRN
Qty: 180 TABLET | Refills: 5
Start: 2020-02-03 | End: 2020-02-17 | Stop reason: SDUPTHER

## 2020-02-03 RX ORDER — SPIRONOLACTONE 25 MG/1
25 TABLET ORAL DAILY
Qty: 30 TABLET | Refills: 0 | Status: ON HOLD | OUTPATIENT
Start: 2020-02-04 | End: 2020-02-13 | Stop reason: HOSPADM

## 2020-02-03 RX ADMIN — PANTOPRAZOLE SODIUM 40 MG: 40 TABLET, DELAYED RELEASE ORAL at 08:02

## 2020-02-03 RX ADMIN — APIXABAN 2.5 MG: 2.5 TABLET, FILM COATED ORAL at 08:02

## 2020-02-03 RX ADMIN — ISOSORBIDE MONONITRATE 30 MG: 30 TABLET, EXTENDED RELEASE ORAL at 08:02

## 2020-02-03 RX ADMIN — ACETAMINOPHEN 650 MG: 325 TABLET ORAL at 03:02

## 2020-02-03 RX ADMIN — METOPROLOL TARTRATE 12.5 MG: 25 TABLET, FILM COATED ORAL at 08:02

## 2020-02-03 RX ADMIN — LOSARTAN POTASSIUM 100 MG: 50 TABLET, FILM COATED ORAL at 08:02

## 2020-02-03 RX ADMIN — SPIRONOLACTONE 25 MG: 25 TABLET ORAL at 08:02

## 2020-02-03 NOTE — PLAN OF CARE
Pt alert and oriented. Pt remained free of falls during shift, no skin breakdown noted at this time. Bed alarm set , call light in reach, room free of clutter, side rails  up x2, pt on telemetry monitor SR/SB, will continue to monitor.

## 2020-02-03 NOTE — TELEPHONE ENCOUNTER
----- Message from Carol Smith RN sent at 2/3/2020 12:13 PM CST -----  This patient was admitted to the hospital for hypertension and her medications were adjusted. She is being discharged today but will need a follow up visit as soon as possible to ensure her new medication regimen is controlling her BP as intended. Please contact the pt directly with appointment availability. Thank you!

## 2020-02-03 NOTE — NURSING
Went over discharge instructions with patient.   Stressed importance of making and keeping all follow ups as well as making prescribed medication changes.   Message sent to Dr. Terrell's staff in regards to f/u with pt upon discharge.  Prescriptions sent to pt's requested pharmacy.  Patient verbalized understanding and has had all questions in regards to discharge answered to satisfaction.  IV rand tele monitor removed by REN Sandoval.  PCT sent to pt's room to discharge pt via wheelchair to personal transportation.

## 2020-02-03 NOTE — TELEPHONE ENCOUNTER
----- Message from Idalia Bartlett RN sent at 2/3/2020 11:37 AM CST -----  Pt needs a hospital follow up appointment with physician or mid level provider in 3 days.  Please call pt with date and time of arranged appointment.  Thanks.

## 2020-02-05 ENCOUNTER — OUTPATIENT CASE MANAGEMENT (OUTPATIENT)
Dept: ADMINISTRATIVE | Facility: OTHER | Age: 85
End: 2020-02-05

## 2020-02-06 NOTE — PROGRESS NOTES
Outpatient Care Management  Plan of Care Follow Up Visit    Patient: Shirley Metz  MRN: [id number]  Date of Service: 02/05/2020  Completed by: Shirley Ryan RN  Referral Date: 01/08/2020  Program: Case Management (High Risk)    Reason for Visit   Patient presents with    OPCM RN First Follow-Up Attempt       Brief Summary: Rec'd return call from pt after voice mail left yesterday. She advised she [facility] in [facility] to continued BP elevations that she could not control with home meds. She reports meds were adjusted and she is aware of new med doses and frequencies and states systolic BP has been around 150 since being home, which she is very happy with. She has rec'd literature mailed by this CM related to [employer] programs and has called and qualified for meal delivery and was told she has a $75 quarterly OTC benefit, but has not yet ordered anything. Reviewed upcoming nephro provider appt tomorrow and encouraged her to review CKD literature mailed by this CM so he may answer any questions she may have after reading it. Also reviewed PCP appt and cardio procedure appts next week and advised this CM will follow up after all are completed to discuss any changes to her meds or other care protocol. She voiced understanding and agreement with this.     Patient Summary     Involvement of Care:  Do I have permission to speak with other family members about your care?   Yes, son    Patient Reported Labs & Vitals:  1.  Any Patient Reported Labs & Vitals?   No  2.  Patient Reported Blood Pressure:     3.  Patient Reported Pulse:     4.  Patient Reported Weight (Kg):     5.  Patient Reported Blood Glucose (mg/dl):       Medical History:  Reviewed medical history with patient and/or caregiver    Social History:  Reviewed social history with patient and/or caregiver    Clinical Assessment     Reviewed and provided basic information on available community  resources for mental health, transportation, wellness resources, and palliative care programs with patient and/or caregiver.    Complex Care Plan     Care plan was discussed and completed today with input from patient and/or caregiver.    Goals      Patient/caregiver will have an action plan in place to manage and prevent complications of a fib prior to discharge from OPCM. - Priority: Low      Overall Time to Completion  2 months from 01/09/2020     OPCM Identified Patient Needs:  Advanced Care Planning:  No  Nutrition:  no  Housing:  no  Medication Adherence:  No  Lab Adherence:  no  Cognitive/Behavioral Health:  no  Communication:  no  Health Literacy:  no  Equipment/Supplies/Services:  no  Culural/[employer] Beliefs:  no  PHQ:  No       OPCM Identified Disease Education Needs:  Hypertension:  Pos - Care Plan Created  Chronic Kidney Disease:  Neg         Short Term Goals  Patient/caregiver will verbalize 2 signs and symptoms of A-Fib within 1 month.   Interventions   · Recognize and provide educational material (ANTONIO).     Status  · Partially met      Patient/caregiver will verbalize 2 ways of preventing complications due to disease process within 1 month.  Interventions   · Encourage compliance with Physician follow-ups.  · Encourage Dietary Compliance.  · Encourage Exercise.  · Encourage Medication Compliance.  · Recognize and provide educational material (ANTONIO).     Status  · Partially met    Patient/Caregiver agrees to receive educational literature about a fib by 1/23/20.  Patient/Caregiver agrees to OPCM follow up within 2 weeks to assess progress to goal.          Clinical Reference Documents Added to Patient Instructions       Document    ATRIAL FIBRILLATION, UNDERSTANDING (ENGLISH)    BALANCING CALCIUM AND PHOSPHORUS, KIDNEY DISEASE (ENGLISH)    CHOOSING THE RIGHT PROTEIN FOR YOUR BODY, KIDNEY DISEASE (ENGLISH)    CHRONIC KIDNEY DISEASE, DIET FOR (ENGLISH)    HEART-HEALTHY FOOD, EATING: USING THE  DASH PLAN (ENGLISH)             Patient/caregiver will have an action plan in place to manage and prevent complications of CKD prior to discharge from OPCM. - Priority: Med      Overall Time to Completion  2 months from 01/09/2020     OPCM Identified Patient Needs:  Advanced Care Planning:  No  Nutrition:  no  Housing:  no  Medication Adherence:  No  Lab Adherence:  no  Cognitive/Behavioral Health:  no  Communication:  no  Health Literacy:  no  Equipment/Supplies/Services:  no  Culural/Rastafari Beliefs:  no  PHQ:  No       OPCM Identified Disease Education Needs:  Hypertension:  Pos - Care Plan Created  Chronic Kidney Disease:  Neg          Short Term Goals  Patient/caregiver will verbalize 2 signs and symptoms of Kidney Disease within 1 month.   Interventions   · Recognize and provide educational material (ANTONIO).     Status  · Partially met - 2/6/20 -       Patient/caregiver will verbalize 2 ways of preventing complications due to disease process within 1 month.  Interventions   · Encourage compliance with Physician follow-ups.  · Encourage Dietary Compliance.  · Encourage Exercise.  · Encourage Medication Compliance.  · Recognize and provide educational material (ANTONIO).     Status  · Partially met    Patient/Caregiver agrees to receive educational literature related to CKD by 1/23/20.  Patient/Caregiver agrees to OPCM follow up within 2 weeks to assess progress to goal.            Clinical Reference Documents Added to Patient Instructions       Document    BALANCING CALCIUM AND PHOSPHORUS, KIDNEY DISEASE (ENGLISH)    CHOOSING THE RIGHT PROTEIN FOR YOUR BODY, KIDNEY DISEASE (ENGLISH)    CHRONIC KIDNEY DISEASE, DIET FOR (ENGLISH)    HEART-HEALTHY FOOD, EATING: USING THE DASH PLAN (ENGLISH)                  Patient Instructions     Instructions were provided via the [facility] patient resources and are available for the patient to view on the patient portal.    Next Steps: Follow up in one week. Discuss outcome of  PCP and nephro provider appts. Review CKD symptoms of exacerbation and measures to help to control. Shirley Ryan RN    Follow up in about 1 week (around 2/12/2020).      Todays OPCM Self-Management Care Plan was developed with the patients/caregivers input and was based on identified barriers from todays assessment.  Goals were written today with the patient/caregiver and the patient has agreed to work towards these goals to improve his/her overall well-being. Patient verbalized understanding of the care plan, goals, and all of today's instructions. Encouraged patient/caregiver to communicate with his/her physician and health care team about health conditions and the treatment plan.  Provided my contact information today and encouraged patient/caregiver to call me with any questions as needed.

## 2020-02-07 ENCOUNTER — OFFICE VISIT (OUTPATIENT)
Dept: NEPHROLOGY | Facility: CLINIC | Age: 85
End: 2020-02-07
Payer: MEDICARE

## 2020-02-07 VITALS
HEART RATE: 64 BPM | DIASTOLIC BLOOD PRESSURE: 82 MMHG | BODY MASS INDEX: 28.79 KG/M2 | SYSTOLIC BLOOD PRESSURE: 148 MMHG | HEIGHT: 64 IN | WEIGHT: 168.63 LBS | RESPIRATION RATE: 18 BRPM | OXYGEN SATURATION: 95 %

## 2020-02-07 DIAGNOSIS — N18.30 CKD (CHRONIC KIDNEY DISEASE) STAGE 3, GFR 30-59 ML/MIN: Primary | ICD-10-CM

## 2020-02-07 PROCEDURE — 99999 PR PBB SHADOW E&M-EST. PATIENT-LVL III: ICD-10-PCS | Mod: PBBFAC,HCNC,, | Performed by: INTERNAL MEDICINE

## 2020-02-07 PROCEDURE — 1159F PR MEDICATION LIST DOCUMENTED IN MEDICAL RECORD: ICD-10-PCS | Mod: HCNC,S$GLB,, | Performed by: INTERNAL MEDICINE

## 2020-02-07 PROCEDURE — 1159F MED LIST DOCD IN RCRD: CPT | Mod: HCNC,S$GLB,, | Performed by: INTERNAL MEDICINE

## 2020-02-07 PROCEDURE — 1101F PR PT FALLS ASSESS DOC 0-1 FALLS W/OUT INJ PAST YR: ICD-10-PCS | Mod: HCNC,CPTII,S$GLB, | Performed by: INTERNAL MEDICINE

## 2020-02-07 PROCEDURE — 1101F PT FALLS ASSESS-DOCD LE1/YR: CPT | Mod: HCNC,CPTII,S$GLB, | Performed by: INTERNAL MEDICINE

## 2020-02-07 PROCEDURE — 99214 PR OFFICE/OUTPT VISIT, EST, LEVL IV, 30-39 MIN: ICD-10-PCS | Mod: HCNC,S$GLB,, | Performed by: INTERNAL MEDICINE

## 2020-02-07 PROCEDURE — 99999 PR PBB SHADOW E&M-EST. PATIENT-LVL III: CPT | Mod: PBBFAC,HCNC,, | Performed by: INTERNAL MEDICINE

## 2020-02-07 PROCEDURE — 1126F PR PAIN SEVERITY QUANTIFIED, NO PAIN PRESENT: ICD-10-PCS | Mod: HCNC,S$GLB,, | Performed by: INTERNAL MEDICINE

## 2020-02-07 PROCEDURE — 99499 RISK ADDL DX/OHS AUDIT: ICD-10-PCS | Mod: HCNC,S$GLB,, | Performed by: INTERNAL MEDICINE

## 2020-02-07 PROCEDURE — 99214 OFFICE O/P EST MOD 30 MIN: CPT | Mod: HCNC,S$GLB,, | Performed by: INTERNAL MEDICINE

## 2020-02-07 PROCEDURE — 99499 UNLISTED E&M SERVICE: CPT | Mod: HCNC,S$GLB,, | Performed by: INTERNAL MEDICINE

## 2020-02-07 PROCEDURE — 1126F AMNT PAIN NOTED NONE PRSNT: CPT | Mod: HCNC,S$GLB,, | Performed by: INTERNAL MEDICINE

## 2020-02-07 NOTE — PROGRESS NOTES
Subjective:       Patient ID: Shirley Metz is a 87 y.o. White female who presents for follow up  evaluation of No chief complaint on file.    Hypertension   This is a chronic problem. The current episode started more than 1 year ago. The problem has been waxing and waning since onset. The problem is resistant. Associated symptoms include anxiety and peripheral edema. Pertinent negatives include no chest pain, headaches, neck pain, palpitations or shortness of breath. There are no associated agents (passive smoking years ago ) to hypertension. Risk factors for coronary artery disease include dyslipidemia, obesity, post-menopausal state, sedentary lifestyle and smoking/tobacco exposure. Past treatments include angiotensin blockers, beta blockers, calcium channel blockers and diuretics. The current treatment provides moderate improvement. There are no compliance problems.  Hypertensive end-organ damage includes left ventricular hypertrophy. Identifiable causes of hypertension include sleep apnea.   Hematuria   Irritative symptoms do not include frequency or urgency. Pertinent negatives include no abdominal pain, chills, dysuria, fever, flank pain, nausea or vomiting.   Edema   Pertinent negatives include no abdominal pain, arthralgias, chest pain, chills, congestion, coughing, diaphoresis, fatigue, fever, headaches, joint swelling, nausea, neck pain, numbness, rash, vomiting or weakness.     Patient is an 85-year-old white female with long-standing history of hypertension which was adequately controlled in 2014 .  She has had issues of edema of the legs and also intolerance to different medications.  She has had severe weakness of her arms and legs for which she is now walking with a walker.  Apparently she has had severe side effects from cholesterol medications.  He has some symptoms of sleep apnea.        In 9/2014 she comes in for consultation for fluctuating blood pressure status post 3 visits to the  emergency room.  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 Cardiology consulted. Patient treated with HCTZ, Losartan, Metoprolol, and PRN antihypertensives. EKG unremarkable. 2D ECHO with EF 60-65% with DD and pulmonary HTN. Troponin trended up. NM stress test negative on 05/15/17. BP improved. .        January 2018 Today seen for follow up after a gap of over 2 years.  Creatinine has been fluctuating between 1.2 and 1.3.  No recent UTI.  Records reviewed 2015.  Normal vitamin D level.  Recent emergency room visit was for shortness of breath and hypertension uncontrolled.  Patient has been followed in multiple specialties including primary care physician Dr. Terrell, due to multiple ALLERGIES/reactions from medications patient has been managed with multiple blood pressure medications without clear evidence of ALLERGY.  Patient has had history of carvedilol causing ALLERGIC reaction but when she was seen in the emergency room no evidence was documented ALLERGY or lip swelling.  Patient has been very difficult to control with blood pressure.  Patient has been followed and managed in cardiology division as well as rheumatology division.  Patient does have diastolic dysfunction and  pulmonary hypertension with evidence of hypertension uncontrolled. Corneal transplant both sides. Off HCTZ due to polyuria       September 23, 2019:  Patient was last seen by Dr. Beach on 1/31/18. She presents to renal clinic for follow-up today. Renal function has remained stable with creatinine at 1.3.   Patient states that she is currently taking Clonidine and Losartan for BP control only. She stopped taking HCTZ because of frequent urination. No major complaints at present.       February 7, 2020:  Patient reports new onset atrial fibrillation since last visit. She also developed intracardiac clot which has now resolved. Patient is s/p cardioversion and now in sinus rhythm. She is still on Eliquis. Patient also reports problems with fluctuating BP. This is in part related to frequent BP medication changes since patient has developed allergies to multiple commonly used BP meds. She is currently on Losartan, metoprolol, isosorbide and aldactone for BP control. She presents home SBP with average of 150s. Patient has no complaints at present.     Review of Systems   Constitutional: Negative for chills, diaphoresis, fatigue and fever.   HENT: Negative for congestion.    Respiratory: Negative for cough and shortness of breath.    Cardiovascular: Negative for chest pain and palpitations.   Gastrointestinal: Negative for abdominal pain, nausea and vomiting.   Genitourinary: Negative for dysuria, flank pain, frequency, hematuria and urgency.   Musculoskeletal: Negative for arthralgias, joint swelling and neck pain.   Skin: Negative for rash.   Neurological: Negative for weakness, numbness and headaches.         Objective:       There were no vitals filed for this visit.    Physical Exam   Constitutional: She is oriented to person, place, and time. She appears well-developed and well-nourished.   HENT:   Head: Normocephalic and atraumatic.   Eyes: Pupils are equal, round, and reactive to light. Conjunctivae and EOM are  normal.   Neck: Normal range of motion and full passive range of motion without pain. Neck supple. Carotid bruit is not present. No edema present. No thyroid mass and no thyromegaly present.   Cardiovascular: Normal rate, regular rhythm, S1 normal, S2 normal, normal heart sounds, intact distal pulses and normal pulses.  No extrasystoles are present. PMI is not displaced. Exam reveals no friction rub.   No murmur heard.  Pulmonary/Chest: Effort normal and breath sounds normal. No accessory muscle usage. No respiratory distress. She has no wheezes. She has no rales. She exhibits no tenderness.   Abdominal: Soft. Bowel sounds are normal. She exhibits no distension and no mass. There is no hepatosplenomegaly. There is no tenderness. There is no rebound and no CVA tenderness. No hernia.   Musculoskeletal: Normal range of motion. She exhibits no edema or tenderness.   Neurological: She is alert and oriented to person, place, and time. She has normal reflexes. No cranial nerve deficit or sensory deficit. She exhibits normal muscle tone. Coordination normal.   Skin: Skin is warm and dry. No bruising, no ecchymosis and no rash noted. No cyanosis or erythema. No pallor. Nails show no clubbing.   Psychiatric: She has a normal mood and affect. Her speech is normal and behavior is normal. Judgment and thought content normal.         Lab Results   Component Value Date    WBC 6.39 02/03/2020    HGB 11.7 (L) 02/03/2020    HCT 36.5 (L) 02/03/2020    MCV 98 02/03/2020     02/03/2020     Lab Results   Component Value Date    CREATININE 1.5 (H) 02/03/2020    BUN 32 (H) 02/03/2020     02/03/2020    K 3.9 02/03/2020     02/03/2020    CO2 25 02/03/2020     Lab Results   Component Value Date    PTH 46.0 01/28/2015    CALCIUM 9.3 02/03/2020    PHOS 3.9 01/08/2020     Lab Results   Component Value Date    ALBUMIN 4.0 02/01/2020     Urinalysis: no protein, no blood (1/28/20)    Assessment:       No diagnosis found.    Plan:            1.  Hypertension: She reports home SBP with average of 150s. Currently on Losartan, metoprolol, isosorbide and aldactone. Last 2 meds were added 3-4 days ago. Will not make any changes at present. She has follow-up with PMD and Cardiology in near future and BP meds adjustments can be made by these providers. Patient voiced understanding.     2.  Chronic kidney disease stage III: Stable with creatinine at 1.5. No proteinuria/hematuria. She will return to clinic in 3 months.     3. Electrolytes: at goal.    4. Acid-base: no issues.    5. Atrial fibrillation: s/p cardioversion, now in NSR, still on Eliquis, followed by Cardiology.    6. Medications. Reviewed.                 Sven Cortez MD

## 2020-02-08 NOTE — DISCHARGE SUMMARY
"[facility] Hospital Medicine  Discharge Summary      Patient Name: Shirley Metz  MRN: [id number]  Admission Date: 2/1/2020  Hospital Length of Stay: 0 days  Discharge Date and Time:  02/08/2020 3:00 PM  Attending Physician: No att. providers found   Discharging Provider: Levar Jacobo MD  Primary Care Provider: Yandy Terrell MD      HPI:   Ms. Bettencourt is an 88yo female with a PMHx of resistant HTN, HLD, metabolic syndrome, and CKD, who presented to the ED with c/o generalized weakness and fatigue x several weeks.  Associated elevated blood pressure.  No aggravating or alleviating factors.  She reports taking 10 clonidine pills throughout the day today with no improvement in BP.  Denies any CP, palpitations, SOB/RUIZ, orthopnea, PND, edema, cough, ABD pain, N/V, diaphoresis, lightheadedness/dizziness, syncope, visual disturbances, HA, AMS, or fever.  She states being compliant with all BP medications.  She has h/o intolerance to multiple BP medications due to side effects.  Upon arrival to ED, /95.  Initial work-up resulted t roponin of 0.03, , and EKG unrevealing.  Patient was bradycardic in the ED, likely due to multiple doses of clonidine and beta-blocker today.  0.625 mg IV enalaprilat and 1" nitro paste given, which improved BP to 168/74.  Hospital Medicine was called for admission.      * No surgery found *      Hospital Course:   2/2-  Admitted overnight for evaluation of markedly elevated blood pressure . No evidence of acute end organ damage . Renal function preserved . Cardiac troponin is marginally elevated and does not appears significant. BNP is elevated noted. Echo in 10/2019 resulted concentric hypertrophy , normal systolic and diastolic function and estimated  PA  pressure 40.     Clonidine was not given due to persistent bradycardia and risk of rebound HTN.  Metoprolol resumed at 12.5 mg bid   Added  Spironolactone 25 mg and Imdur 30 mg and pt seems to be " tolerating and responding   Will monitor BP trend   Possible D/C in am   2/3  BP control is better with current regimen of antihypertensives . Pt was examined before discharge and found stable and suitable for discharge.      Consults:     No new Assessment & Plan notes have been filed under this hospital service since the last note was generated.  Service: Hospital Medicine    Final Active Diagnoses:      Problems Resolved During this Admission:    Diagnosis Date Noted Date Resolved POA    PRINCIPAL PROBLEM:  Accelerated hypertension [I10] 03/16/2018 02/03/2020 Yes    Sinus bradycardia [R00.1] 02/02/2020 02/03/2020 Yes    Elevated troponin [R79.89] 03/15/2018 02/03/2020 Yes       Discharged Condition: stable    Disposition: Home or Self Care    Follow Up:  Follow-up Information     Yandy Terrell MD. Schedule an appointment as soon as possible for a visit in 3 days.    Specialty:  Family Medicine  Why:  Discharge follow up on HTN and new meds   Contact information:  [facility] [place] [address] [place] [id number] [phone]                 Patient Instructions:      Diet Cardiac   Order Comments: Salt restriction 2 gram /day     Activity as tolerated       Significant Diagnostic Studies: Labs: BMP: No results for input(s): GLU, NA, K, CL, CO2, BUN, CREATININE, CALCIUM, MG in the last 48 hours., CMP No results for input(s): NA, K, CL, CO2, GLU, BUN, CREATININE, CALCIUM, PROT, ALBUMIN, BILITOT, ALKPHOS, AST, ALT, ANIONGAP, ESTGFRAFRICA, EGFRNONAA in the last 48 hours. and CBC No results for input(s): WBC, HGB, HCT, PLT in the last 48 hours.    Pending Diagnostic Studies:     None         Medications:  Reconciled Home Medications:      Medication List      START taking these medications    isosorbide mononitrate 30 MG 24 hr tablet  Commonly known as:  IMDUR  Take 1 tablet (30 mg total) by mouth once daily.     spironolactone 25 MG tablet  Commonly known as:  ALDACTONE  Take 1 tablet (25 mg total) by mouth once daily.         CHANGE how you take these medications    cloNIDine 0.1 MG tablet  Commonly known as:  CATAPRES  Take 2 tablets (0.2 mg total) by mouth 3 (three) times daily as needed (for BP more than 170/100).  What changed:    · when to take this  · reasons to take this        CONTINUE taking these medications    acetaminophen 500 MG tablet  Commonly known as:  TYLENOL  Take 500 mg by mouth daily as needed. Taking 1 to 3     ALPRAZolam 0.5 MG tablet  Commonly known as:  XANAX  TAKE 1/2 - 1 TABLET BY MOUTH NIGHTLY FOR SLEEP OR ANXIETY     apixaban 2.5 mg Tab  Commonly known as:  ELIQUIS  Take 1 tablet (2.5 mg total) by mouth 2 (two) times daily.     coenzyme Q10 200 mg capsule  Take 400 mg by mouth once daily.     doxazosin 4 MG tablet  Commonly known as:  CARDURA  Take 1 tablet (4 mg total) by mouth every evening.     fish oil-omega-3 fatty acids 300-1,000 mg capsule  Take 2 g by mouth 2 (two) times daily.     losartan 50 MG tablet  Commonly known as:  COZAAR  Take 2 tablets (100 mg total) by mouth once daily.     Lotemax 0.5 % Drpg  Generic drug:  loteprednol etabonate     metoprolol tartrate 25 MG tablet  Commonly known as:  LOPRESSOR  Take 1 tablet (25 mg total) by mouth 2 (two) times daily.     MIRALAX ORAL  Take by mouth as needed. Taking BID     Rhopressa 0.02 % Drop  Generic drug:  netarsudil  once daily.     Travatan Z 0.004 % ophthalmic solution  Generic drug:  travoprost  PLACE 1 DROP INTO THE LEFT EYE EVERY EVENING.     Vitamin D3 50 mcg (2,000 unit) Cap  Generic drug:  cholecalciferol (vitamin D3)  Take 1 capsule by mouth once daily.        STOP taking these medications    bumetanide 0.5 MG Tab  Commonly known as:  BUMEX            Indwelling Lines/Drains at time of discharge:   Lines/Drains/Airways     None                 Time spent on the discharge of patient: 40  minutes  Patient was seen and examined on the date of discharge and determined to be suitable for discharge.         Levar Jacobo MD  Department  of [facility] Medicine  [facility]

## 2020-02-10 ENCOUNTER — HOSPITAL ENCOUNTER (OUTPATIENT)
Dept: CARDIOLOGY | Facility: HOSPITAL | Age: 85
Discharge: HOME | DRG: 309 | End: 2020-02-10
Attending: INTERNAL MEDICINE
Payer: MEDICARE

## 2020-02-10 ENCOUNTER — OFFICE VISIT (OUTPATIENT)
Dept: INTERNAL MEDICINE | Facility: CLINIC | Age: 85
DRG: 309 | End: 2020-02-10
Payer: MEDICARE

## 2020-02-10 VITALS
WEIGHT: 168.75 LBS | BODY MASS INDEX: 28.81 KG/M2 | DIASTOLIC BLOOD PRESSURE: 67 MMHG | HEART RATE: 65 BPM | HEIGHT: 64 IN | OXYGEN SATURATION: 96 % | TEMPERATURE: 98 F | SYSTOLIC BLOOD PRESSURE: 126 MMHG

## 2020-02-10 VITALS
HEIGHT: 64 IN | DIASTOLIC BLOOD PRESSURE: 67 MMHG | WEIGHT: 168 LBS | BODY MASS INDEX: 28.68 KG/M2 | SYSTOLIC BLOOD PRESSURE: 126 MMHG

## 2020-02-10 VITALS
DIASTOLIC BLOOD PRESSURE: 67 MMHG | WEIGHT: 168 LBS | BODY MASS INDEX: 28.68 KG/M2 | HEIGHT: 64 IN | SYSTOLIC BLOOD PRESSURE: 126 MMHG

## 2020-02-10 DIAGNOSIS — Z09 HOSPITAL DISCHARGE FOLLOW-UP: Primary | ICD-10-CM

## 2020-02-10 DIAGNOSIS — R09.89 UNEQUAL BLOOD PRESSURES IN PAIRED EXTREMITIES: ICD-10-CM

## 2020-02-10 DIAGNOSIS — I10 ESSENTIAL HYPERTENSION: ICD-10-CM

## 2020-02-10 DIAGNOSIS — R09.89 UNEQUAL BLOOD PRESSURES IN PAIRED EXTREMITIES: Primary | ICD-10-CM

## 2020-02-10 PROCEDURE — 1159F PR MEDICATION LIST DOCUMENTED IN MEDICAL RECORD: ICD-10-PCS | Mod: HCNC,S$GLB,, | Performed by: FAMILY MEDICINE

## 2020-02-10 PROCEDURE — 1159F MED LIST DOCD IN RCRD: CPT | Mod: HCNC,S$GLB,, | Performed by: FAMILY MEDICINE

## 2020-02-10 PROCEDURE — 99213 OFFICE O/P EST LOW 20 MIN: CPT | Mod: HCNC,S$GLB,, | Performed by: FAMILY MEDICINE

## 2020-02-10 PROCEDURE — 99999 PR PBB SHADOW E&M-EST. PATIENT-LVL III: CPT | Mod: PBBFAC,HCNC,, | Performed by: FAMILY MEDICINE

## 2020-02-10 PROCEDURE — 1125F PR PAIN SEVERITY QUANTIFIED, PAIN PRESENT: ICD-10-PCS | Mod: HCNC,S$GLB,, | Performed by: FAMILY MEDICINE

## 2020-02-10 PROCEDURE — 99999 PR PBB SHADOW E&M-EST. PATIENT-LVL III: ICD-10-PCS | Mod: PBBFAC,HCNC,, | Performed by: FAMILY MEDICINE

## 2020-02-10 PROCEDURE — 93880 EXTRACRANIAL BILAT STUDY: CPT | Mod: 26,HCNC,, | Performed by: INTERNAL MEDICINE

## 2020-02-10 PROCEDURE — 1101F PT FALLS ASSESS-DOCD LE1/YR: CPT | Mod: HCNC,CPTII,S$GLB, | Performed by: FAMILY MEDICINE

## 2020-02-10 PROCEDURE — 93931 UPPER EXTREMITY STUDY: CPT | Mod: 26,HCNC,LT, | Performed by: INTERNAL MEDICINE

## 2020-02-10 PROCEDURE — 93930 UPPER EXTREMITY STUDY: CPT | Mod: 50,HCNC

## 2020-02-10 PROCEDURE — 93931 CV US DOPPLER ARTERIAL ARM LEFT (CUPID ONLY): ICD-10-PCS | Mod: 26,HCNC,LT, | Performed by: INTERNAL MEDICINE

## 2020-02-10 PROCEDURE — 93880 CV US DOPPLER CAROTID (CUPID ONLY): ICD-10-PCS | Mod: 26,HCNC,, | Performed by: INTERNAL MEDICINE

## 2020-02-10 PROCEDURE — 99213 PR OFFICE/OUTPT VISIT, EST, LEVL III, 20-29 MIN: ICD-10-PCS | Mod: HCNC,S$GLB,, | Performed by: FAMILY MEDICINE

## 2020-02-10 PROCEDURE — 1101F PR PT FALLS ASSESS DOC 0-1 FALLS W/OUT INJ PAST YR: ICD-10-PCS | Mod: HCNC,CPTII,S$GLB, | Performed by: FAMILY MEDICINE

## 2020-02-10 PROCEDURE — 93931 UPPER EXTREMITY STUDY: CPT | Mod: HCNC,LT

## 2020-02-10 PROCEDURE — 93880 EXTRACRANIAL BILAT STUDY: CPT | Mod: HCNC

## 2020-02-10 PROCEDURE — 1125F AMNT PAIN NOTED PAIN PRSNT: CPT | Mod: HCNC,S$GLB,, | Performed by: FAMILY MEDICINE

## 2020-02-10 NOTE — PROGRESS NOTES
Subjective:       Patient ID: Shirley Metz is a 87 y.o. female.    Chief Complaint: Hospital Follow Up    Here for Hospital F/U - her BP is excellent today, 126/67 at intake. She was discharged 02/08/20/20 after brief stay for significantly elevated blood pressures.  She was also bradycardic, felt to be due to multiple doses of clonidine in pt's attempts to bring her blood pressure down. She was started on spironolactone 25 and isosorbide mononitrate 30 mg each once daily while inpt.  Patient states she has taken clonidine 3 times since discharge the last time this morning. Her blood pressure was 190/96 at 5:00 a.m. she took a clonidine 0.1 and came down to 141/73.  She states it is in the 150s to 170s in general.  She states that if it is 180 or above she will take a clonidine.  It will drop down to 120 after taking clonidine. Not experiencing any problems with the new medications.    Review of Systems   HENT: Positive for trouble swallowing (improved - no problem with food - just fluid in early AM or late PM).    Respiratory: Negative for shortness of breath.    Cardiovascular: Positive for leg swelling (off and on). Negative for chest pain and palpitations (not since hosp DC).       Objective:      Physical Exam   Constitutional: She is oriented to person, place, and time. She appears well-developed.   HENT:   Head: Normocephalic and atraumatic.   Cardiovascular: Normal rate, regular rhythm and normal heart sounds.   Pulmonary/Chest: Effort normal and breath sounds normal.   Musculoskeletal: She exhibits no edema.   Neurological: She is alert and oriented to person, place, and time.   Skin: Skin is warm and dry.   Psychiatric: She has a normal mood and affect. Her behavior is normal.         Assessment/Plan:     1. Hospital discharge follow-up     2. Essential hypertension      Recommend she restart her doxazosin 4 mg at bedtime to help with persistent high blood pressures. (initially started  1/30/2020 by cardiology).She is warned about hypotension possible especially with the 1st dosing. Recheck 4 weeks.

## 2020-02-11 LAB
LEFT ARM DIASTOLIC BLOOD PRESSURE: 67 MMHG
LEFT ARM SYSTOLIC BLOOD PRESSURE: 118 MMHG
LEFT CBA DIAS: 9 CM/S
LEFT CBA SYS: 82 CM/S
LEFT CCA DIST DIAS: 8 CM/S
LEFT CCA DIST SYS: 88 CM/S
LEFT CCA MID DIAS: 10 CM/S
LEFT CCA MID SYS: 84 CM/S
LEFT CCA PROX DIAS: 9 CM/S
LEFT CCA PROX SYS: 88 CM/S
LEFT ECA SYS: 115 CM/S
LEFT ICA DIST DIAS: 20 CM/S
LEFT ICA DIST SYS: 67 CM/S
LEFT ICA MID DIAS: 15 CM/S
LEFT ICA MID SYS: 104 CM/S
LEFT ICA PROX DIAS: 11 CM/S
LEFT ICA PROX SYS: 110 CM/S
LEFT VERTEBRAL DIAS: 9 CM/S
LEFT VERTEBRAL SYS: 47 CM/S
LEFT WRIST BRACHIAL INDEX: 1.1 WBI
OHS CV CAROTID RIGHT ICA EDV HIGHEST: 13
OHS CV CAROTID ULTRASOUND LEFT ICA/CCA RATIO: 1.25
OHS CV CAROTID ULTRASOUND RIGHT ICA/CCA RATIO: 1.15
OHS CV PV CAROTID LEFT HIGHEST CCA: 88
OHS CV PV CAROTID LEFT HIGHEST ICA: 110
OHS CV PV CAROTID RIGHT HIGHEST CCA: 101
OHS CV PV CAROTID RIGHT HIGHEST ICA: 116
OHS CV US CAROTID LEFT HIGHEST EDV: 20
RIGHT ARM DIASTOLIC BLOOD PRESSURE: 67 MMHG
RIGHT ARM SYSTOLIC BLOOD PRESSURE: 126 MMHG
RIGHT CBA DIAS: 11 CM/S
RIGHT CBA SYS: 77 CM/S
RIGHT CCA DIST DIAS: 11 CM/S
RIGHT CCA DIST SYS: 101 CM/S
RIGHT CCA MID DIAS: 6 CM/S
RIGHT CCA MID SYS: 62 CM/S
RIGHT CCA PROX DIAS: 6 CM/S
RIGHT CCA PROX SYS: 69 CM/S
RIGHT ECA SYS: 125 CM/S
RIGHT ICA DIST DIAS: 13 CM/S
RIGHT ICA DIST SYS: 68 CM/S
RIGHT ICA MID DIAS: 8 CM/S
RIGHT ICA MID SYS: 109 CM/S
RIGHT ICA PROX DIAS: 12 CM/S
RIGHT ICA PROX SYS: 116 CM/S
RIGHT VERTEBRAL DIAS: 6 CM/S
RIGHT VERTEBRAL SYS: 29 CM/S
UPPER ARTERIAL LEFT ARM AXILLARY SYS MAX: 189 CM/S
UPPER ARTERIAL LEFT ARM BRACHIAL SYS MAX: 199 CM/S
UPPER ARTERIAL LEFT ARM RADIAL SYS MAX: 81 CM/S
UPPER ARTERIAL LEFT ARM SUBCLAVIAN SYS MAX: 223 CM/S
UPPER ARTERIAL LEFT ARM ULNAR SYS MAX: 109 CM/S

## 2020-02-12 ENCOUNTER — PATIENT MESSAGE (OUTPATIENT)
Dept: INTERNAL MEDICINE | Facility: CLINIC | Age: 85
End: 2020-02-12

## 2020-02-12 ENCOUNTER — TELEPHONE (OUTPATIENT)
Dept: FAMILY MEDICINE | Facility: CLINIC | Age: 85
End: 2020-02-12

## 2020-02-12 ENCOUNTER — HOSPITAL ENCOUNTER (INPATIENT)
Facility: HOSPITAL | Age: 85
LOS: 1 days | Discharge: HOME | DRG: 309 | End: 2020-02-13
Attending: EMERGENCY MEDICINE | Admitting: FAMILY MEDICINE
Payer: MEDICARE

## 2020-02-12 ENCOUNTER — NURSE TRIAGE (OUTPATIENT)
Dept: ADMINISTRATIVE | Facility: CLINIC | Age: 85
End: 2020-02-12

## 2020-02-12 DIAGNOSIS — I48.92 ATRIAL FLUTTER WITH RAPID VENTRICULAR RESPONSE: Primary | ICD-10-CM

## 2020-02-12 DIAGNOSIS — I48.91 A-FIB: ICD-10-CM

## 2020-02-12 DIAGNOSIS — R00.0 TACHYCARDIA: ICD-10-CM

## 2020-02-12 DIAGNOSIS — R79.89 ELEVATED TROPONIN: ICD-10-CM

## 2020-02-12 DIAGNOSIS — Z79.01 CHRONIC ANTICOAGULATION: ICD-10-CM

## 2020-02-12 PROBLEM — E87.5 HYPERKALEMIA: Status: ACTIVE | Noted: 2020-02-12

## 2020-02-12 LAB
ALBUMIN SERPL BCP-MCNC: 3.8 G/DL (ref 3.5–5.2)
ALP SERPL-CCNC: 76 U/L (ref 55–135)
ALT SERPL W/O P-5'-P-CCNC: 19 U/L (ref 10–44)
ANION GAP SERPL CALC-SCNC: 12 MMOL/L (ref 8–16)
APTT BLDCRRT: 27.8 SEC (ref 21–32)
AST SERPL-CCNC: 33 U/L (ref 10–40)
BASOPHILS # BLD AUTO: 0.05 K/UL (ref 0–0.2)
BASOPHILS NFR BLD: 0.7 % (ref 0–1.9)
BILIRUB SERPL-MCNC: 0.5 MG/DL (ref 0.1–1)
BNP SERPL-MCNC: 170 PG/ML (ref 0–99)
BUN SERPL-MCNC: 25 MG/DL (ref 8–23)
CALCIUM SERPL-MCNC: 9.7 MG/DL (ref 8.7–10.5)
CHLORIDE SERPL-SCNC: 106 MMOL/L (ref 95–110)
CO2 SERPL-SCNC: 21 MMOL/L (ref 23–29)
CREAT SERPL-MCNC: 1.3 MG/DL (ref 0.5–1.4)
DIFFERENTIAL METHOD: ABNORMAL
EOSINOPHIL # BLD AUTO: 0.1 K/UL (ref 0–0.5)
EOSINOPHIL NFR BLD: 1.9 % (ref 0–8)
ERYTHROCYTE [DISTWIDTH] IN BLOOD BY AUTOMATED COUNT: 13.2 % (ref 11.5–14.5)
EST. GFR  (AFRICAN AMERICAN): 43 ML/MIN/1.73 M^2
EST. GFR  (NON AFRICAN AMERICAN): 37 ML/MIN/1.73 M^2
GLUCOSE SERPL-MCNC: 133 MG/DL (ref 70–110)
HCT VFR BLD AUTO: 42.1 % (ref 37–48.5)
HGB BLD-MCNC: 13.5 G/DL (ref 12–16)
IMM GRANULOCYTES # BLD AUTO: 0.01 K/UL (ref 0–0.04)
IMM GRANULOCYTES NFR BLD AUTO: 0.1 % (ref 0–0.5)
INR PPP: 1 (ref 0.8–1.2)
LYMPHOCYTES # BLD AUTO: 1.6 K/UL (ref 1–4.8)
LYMPHOCYTES NFR BLD: 21.4 % (ref 18–48)
MAGNESIUM SERPL-MCNC: 2.1 MG/DL (ref 1.6–2.6)
MCH RBC QN AUTO: 31.3 PG (ref 27–31)
MCHC RBC AUTO-ENTMCNC: 32.1 G/DL (ref 32–36)
MCV RBC AUTO: 98 FL (ref 82–98)
MONOCYTES # BLD AUTO: 0.8 K/UL (ref 0.3–1)
MONOCYTES NFR BLD: 10.4 % (ref 4–15)
NEUTROPHILS # BLD AUTO: 4.9 K/UL (ref 1.8–7.7)
NEUTROPHILS NFR BLD: 65.5 % (ref 38–73)
NRBC BLD-RTO: 0 /100 WBC
PLATELET # BLD AUTO: 252 K/UL (ref 150–350)
PMV BLD AUTO: 10 FL (ref 9.2–12.9)
POTASSIUM SERPL-SCNC: 3.6 MMOL/L (ref 3.5–5.1)
POTASSIUM SERPL-SCNC: 5.6 MMOL/L (ref 3.5–5.1)
PROT SERPL-MCNC: 8 G/DL (ref 6–8.4)
PROTHROMBIN TIME: 10.7 SEC (ref 9–12.5)
RBC # BLD AUTO: 4.32 M/UL (ref 4–5.4)
SODIUM SERPL-SCNC: 139 MMOL/L (ref 136–145)
TROPONIN I SERPL DL<=0.01 NG/ML-MCNC: 0.07 NG/ML (ref 0–0.03)
WBC # BLD AUTO: 7.42 K/UL (ref 3.9–12.7)

## 2020-02-12 PROCEDURE — 83880 ASSAY OF NATRIURETIC PEPTIDE: CPT | Mod: HCNC

## 2020-02-12 PROCEDURE — 80053 COMPREHEN METABOLIC PANEL: CPT | Mod: HCNC

## 2020-02-12 PROCEDURE — 83735 ASSAY OF MAGNESIUM: CPT | Mod: HCNC

## 2020-02-12 PROCEDURE — 96374 THER/PROPH/DIAG INJ IV PUSH: CPT | Mod: HCNC

## 2020-02-12 PROCEDURE — 93005 ELECTROCARDIOGRAM TRACING: CPT | Mod: HCNC

## 2020-02-12 PROCEDURE — 21400001 HC TELEMETRY ROOM: Mod: HCNC

## 2020-02-12 PROCEDURE — 25000003 PHARM REV CODE 250: Mod: HCNC | Performed by: FAMILY MEDICINE

## 2020-02-12 PROCEDURE — 84484 ASSAY OF TROPONIN QUANT: CPT | Mod: HCNC

## 2020-02-12 PROCEDURE — 85730 THROMBOPLASTIN TIME PARTIAL: CPT | Mod: HCNC

## 2020-02-12 PROCEDURE — 99233 SBSQ HOSP IP/OBS HIGH 50: CPT | Mod: 25,HCNC,, | Performed by: INTERNAL MEDICINE

## 2020-02-12 PROCEDURE — 99285 EMERGENCY DEPT VISIT HI MDM: CPT | Mod: 25,HCNC

## 2020-02-12 PROCEDURE — 63600175 PHARM REV CODE 636 W HCPCS: Mod: HCNC | Performed by: FAMILY MEDICINE

## 2020-02-12 PROCEDURE — 96375 TX/PRO/DX INJ NEW DRUG ADDON: CPT

## 2020-02-12 PROCEDURE — 36415 COLL VENOUS BLD VENIPUNCTURE: CPT | Mod: HCNC

## 2020-02-12 PROCEDURE — 85025 COMPLETE CBC W/AUTO DIFF WBC: CPT | Mod: HCNC

## 2020-02-12 PROCEDURE — 63600175 PHARM REV CODE 636 W HCPCS: Mod: HCNC | Performed by: EMERGENCY MEDICINE

## 2020-02-12 PROCEDURE — 85610 PROTHROMBIN TIME: CPT | Mod: HCNC

## 2020-02-12 PROCEDURE — 93010 EKG 12-LEAD: ICD-10-PCS | Mod: HCNC,,, | Performed by: INTERNAL MEDICINE

## 2020-02-12 PROCEDURE — 25000003 PHARM REV CODE 250: Mod: HCNC | Performed by: EMERGENCY MEDICINE

## 2020-02-12 PROCEDURE — 93010 ELECTROCARDIOGRAM REPORT: CPT | Mod: HCNC,,, | Performed by: INTERNAL MEDICINE

## 2020-02-12 PROCEDURE — 25000003 PHARM REV CODE 250: Mod: HCNC | Performed by: INTERNAL MEDICINE

## 2020-02-12 PROCEDURE — 99233 PR SUBSEQUENT HOSPITAL CARE,LEVL III: ICD-10-PCS | Mod: 25,HCNC,, | Performed by: INTERNAL MEDICINE

## 2020-02-12 PROCEDURE — 84132 ASSAY OF SERUM POTASSIUM: CPT | Mod: HCNC

## 2020-02-12 RX ORDER — METOPROLOL TARTRATE 1 MG/ML
5 INJECTION, SOLUTION INTRAVENOUS EVERY 4 HOURS PRN
Status: DISCONTINUED | OUTPATIENT
Start: 2020-02-12 | End: 2020-02-13 | Stop reason: HOSPADM

## 2020-02-12 RX ORDER — DILTIAZEM HYDROCHLORIDE 5 MG/ML
20 INJECTION INTRAVENOUS
Status: DISCONTINUED | OUTPATIENT
Start: 2020-02-12 | End: 2020-02-12

## 2020-02-12 RX ORDER — METOPROLOL TARTRATE 25 MG/1
25 TABLET, FILM COATED ORAL 2 TIMES DAILY
Status: DISCONTINUED | OUTPATIENT
Start: 2020-02-12 | End: 2020-02-13 | Stop reason: HOSPADM

## 2020-02-12 RX ORDER — ISOSORBIDE MONONITRATE 30 MG/1
30 TABLET, EXTENDED RELEASE ORAL DAILY
Status: DISCONTINUED | OUTPATIENT
Start: 2020-02-13 | End: 2020-02-13 | Stop reason: HOSPADM

## 2020-02-12 RX ORDER — DOXAZOSIN 2 MG/1
4 TABLET ORAL NIGHTLY
Status: DISCONTINUED | OUTPATIENT
Start: 2020-02-12 | End: 2020-02-13 | Stop reason: HOSPADM

## 2020-02-12 RX ORDER — LOSARTAN POTASSIUM 50 MG/1
100 TABLET ORAL DAILY
Status: DISCONTINUED | OUTPATIENT
Start: 2020-02-13 | End: 2020-02-12

## 2020-02-12 RX ORDER — SPIRONOLACTONE 25 MG/1
25 TABLET ORAL DAILY
Status: DISCONTINUED | OUTPATIENT
Start: 2020-02-13 | End: 2020-02-12

## 2020-02-12 RX ORDER — ACETAMINOPHEN 325 MG/1
650 TABLET ORAL EVERY 6 HOURS PRN
Status: DISCONTINUED | OUTPATIENT
Start: 2020-02-12 | End: 2020-02-13 | Stop reason: HOSPADM

## 2020-02-12 RX ORDER — ALPRAZOLAM 0.5 MG/1
0.5 TABLET ORAL NIGHTLY PRN
Status: DISCONTINUED | OUTPATIENT
Start: 2020-02-12 | End: 2020-02-13 | Stop reason: HOSPADM

## 2020-02-12 RX ORDER — METOPROLOL TARTRATE 1 MG/ML
5 INJECTION, SOLUTION INTRAVENOUS EVERY 5 MIN PRN
Status: COMPLETED | OUTPATIENT
Start: 2020-02-12 | End: 2020-02-12

## 2020-02-12 RX ORDER — CLONIDINE HYDROCHLORIDE 0.2 MG/1
0.2 TABLET ORAL 3 TIMES DAILY PRN
Status: DISCONTINUED | OUTPATIENT
Start: 2020-02-12 | End: 2020-02-13 | Stop reason: HOSPADM

## 2020-02-12 RX ORDER — SODIUM CHLORIDE 9 MG/ML
INJECTION, SOLUTION INTRAVENOUS CONTINUOUS
Status: DISCONTINUED | OUTPATIENT
Start: 2020-02-12 | End: 2020-02-13 | Stop reason: HOSPADM

## 2020-02-12 RX ADMIN — METOPROLOL TARTRATE 5 MG: 5 INJECTION INTRAVENOUS at 04:02

## 2020-02-12 RX ADMIN — METOPROLOL TARTRATE 25 MG: 25 TABLET, FILM COATED ORAL at 10:02

## 2020-02-12 RX ADMIN — SODIUM CHLORIDE: 0.9 INJECTION, SOLUTION INTRAVENOUS at 09:02

## 2020-02-12 RX ADMIN — AMIODARONE HYDROCHLORIDE 0.5 MG/MIN: 1.8 INJECTION, SOLUTION INTRAVENOUS at 11:02

## 2020-02-12 RX ADMIN — APIXABAN 2.5 MG: 2.5 TABLET, FILM COATED ORAL at 09:02

## 2020-02-12 RX ADMIN — ALPRAZOLAM 0.5 MG: 0.5 TABLET ORAL at 09:02

## 2020-02-12 RX ADMIN — AMIODARONE HYDROCHLORIDE 150 MG: 1.5 INJECTION, SOLUTION INTRAVENOUS at 05:02

## 2020-02-12 RX ADMIN — AMIODARONE HYDROCHLORIDE 1 MG/MIN: 1.8 INJECTION, SOLUTION INTRAVENOUS at 05:02

## 2020-02-12 RX ADMIN — ACETAMINOPHEN 650 MG: 325 TABLET ORAL at 10:02

## 2020-02-12 NOTE — ED PROVIDER NOTES
SCRIBE #1 NOTE: I, Molina Allison, am scribing for, and in the presence of, Trang Tobar MD. I have scribed the entire note.       History     Chief Complaint   Patient presents with    Irregular Heart Beat     pt reports irregular pulse, blurred vision, and weakness x 2 days     Review of patient's allergies indicates:   Allergen Reactions    Claritin [loratadine] Other (See Comments)     Double vision/spots    Hydrocodone-acetaminophen      wheezing    Labetalol Shortness Of Breath, Nausea Only and Other (See Comments)     Palpitations, LE weakness    Naproxen      wheezing    Verapamil Diarrhea    Vibramycin [doxycycline calcium] Other (See Comments)     Weakness nausea   sob    Amlodipine      Myalgias      Coreg [carvedilol] Swelling     lips    Fenofibrate      Causes muscle weakness    Hydralazine analogues      Muscle tremors/aches      Isosorbide     Lasix [furosemide]      myalgias    Moxifloxacin Other (See Comments)     Hives   muscle soreness    Timolol     Adhesive Rash     Clonidine patch - 2 mfg - contact dermatitis    Allegra [fexofenadine] Other (See Comments)     Double vision/spots     Codeine Diarrhea and Other (See Comments)     Loss of balance     Doxazosin Palpitations    Erythromycin Other (See Comments)     Complete weakness/could not walk    Hydrocortisone (bulk) Other (See Comments)     Only suppository/ caused muscle weakness    Macrolide antibiotics Other (See Comments)     Complete weakness/could not walk    Patanol [olopatadine] Other (See Comments)     Muscle weakness    Prednisone Anxiety    Statins-hmg-coa reductase inhibitors Other (See Comments)     Muscle weakness    Xalatan [latanoprost] Other (See Comments)     Muscle weakness         History of Present Illness     HPI    2/12/2020, 3:02 PM  History obtained from the patient      History of Present Illness: Shirley Metz is a 87 y.o. female patient with a h/o AFRVR who presents to  the Emergency Department for evaluation of SOB which onset 2 days ago. Symptoms are constant and moderate in severity. No mitigating or exacerbating factors reported. Associated sxs include dizziness, nausea, and palpitations. Patient denies any fever, cough, CP, emesis, weakness/numbness, and all other sxs at this time. No prior Tx reported. No further complaints or concerns at this time. Patient recently started on doxazosin by PCP. Patient states she has taken 2 doses and thinks current sx are a reaction to doxazosin.       Arrival mode: Personal transportation      PCP: Yandy Terrell MD      Past Medical History:  Past Medical History:   Diagnosis Date    Anemia 11/29/2018    Anxiety 11/28/2017    Arthritis     Atrial fibrillation with RVR 10/9/2019    Back pain     Basal cell carcinoma 03/29/2018    left mid back    Chronic diastolic congestive heart failure 10/15/2019    Colon polyps     reported per patient.     Fuchs' corneal dystrophy     General anesthetics causing adverse effect in therapeutic use     woke up during surgery and gagged on ET tube    Glaucoma     Glaucoma     Heart failure with preserved left ventricular function (HFpEF) 7/24/2018    Hyperlipidemia     Hypertension     Macular degeneration dry    Obesity     Squamous cell carcinoma 01/08/2019    left shoulder    Trouble in sleeping     Urinary tract infection        Past Surgical History:  Past Surgical History:   Procedure Laterality Date    ADENOIDECTOMY  1938    BREAST BIOPSY Left     1997. benign    BREAST CYST EXCISION Left 1997 approx    CARPAL TUNNEL RELEASE Right 2012 approx    CATARACT EXTRACTION Bilateral 1994    CHOLECYSTECTOMY  1975    CORNEAL TRANSPLANT Bilateral 08/2015 8/2015 - left; Right 4/2016    EYE SURGERY      Fuch's Corneal dystrophy - had 2nd operation with Dr. Quintanilla    EYE SURGERY      Cataract wIOL    left thumb Left 2011    removed cuboid for arthritis    REVERSE TOTAL  SHOULDER ARTHROPLASTY Left 8/21/2018    Procedure: ARTHROPLASTY, SHOULDER, TOTAL, REVERSE;  Surgeon: Solomon Lujan MD;  Location: [facility] OR;  Service: Orthopedics;  Laterality: Left;  WITH BICEP TENODESIS    SKIN CANCER EXCISION Left 2017    BCC removal by Dr. Valadez    SLT- OS (aka TRABECULOPLASTY) Left 05/11/2011    repeat 3/14/12    TENOTOMY Left 8/21/2018    Procedure: TENOTOMY;  Surgeon: Solomon Lujan MD;  Location: [facility] OR;  Service: Orthopedics;  Laterality: Left;    TONSILLECTOMY  1938    TREATMENT OF CARDIAC ARRHYTHMIA N/A 10/10/2019    Procedure: CARDIOVERSION;  Surgeon: Pete Durán MD;  Location: [facility] CATH LAB;  Service: Cardiology;  Laterality: N/A;    TREATMENT OF CARDIAC ARRHYTHMIA N/A 12/6/2019    Procedure: CARDIOVERSION;  Surgeon: Samy Castillo MD;  Location: [facility] CATH LAB;  Service: Cardiology;  Laterality: N/A;         Family History:  Family History   Problem Relation Age of Onset    Heart disease Mother     Hypertension Mother     Cancer Father 88        sarcoma( ?[place]'s ) on leg    Deep vein thrombosis Neg Hx     Ovarian cancer Neg Hx     Breast cancer Neg Hx     Kidney disease Neg Hx     Strabismus Neg Hx     Retinal detachment Neg Hx     Macular degeneration Neg Hx     Glaucoma Neg Hx     Blindness Neg Hx     Amblyopia Neg Hx     Eczema Neg Hx     Lupus Neg Hx     Melanoma Neg Hx     Psoriasis Neg Hx     Diabetes Neg Hx     Stroke Neg Hx     Mental retardation Neg Hx     Mental illness Neg Hx     Hyperlipidemia Neg Hx     COPD Neg Hx     Asthma Neg Hx     Depression Neg Hx     Alcohol abuse Neg Hx     Drug abuse Neg Hx        Social History:   Social History     Tobacco Use    Smoking status: Never Smoker    Smokeless tobacco: Never Used    Tobacco comment: history of passive smoking from her ex-   Substance and Sexual Activity    Alcohol use: Not Currently     Alcohol/week: 2.0 standard drinks     Types: 2 Standard drinks or equivalent  per week     Frequency: Monthly or less     Drinks per session: 1 or 2     Binge frequency: Never     Comment: occasional     Drug use: No    Sexual activity: Not Currently     Partners: Male     Birth control/protection: Post-menopausal     Comment: discussed protection for STD's        Review of Systems     Review of Systems   Constitutional: Negative for fever.   HENT: Negative for sore throat.    Respiratory: Positive for shortness of breath. Negative for cough.    Cardiovascular: Positive for palpitations. Negative for chest pain.   Gastrointestinal: Positive for nausea. Negative for vomiting.   Genitourinary: Negative for dysuria.   Musculoskeletal: Negative for back pain.   Skin: Negative for rash.   Neurological: Positive for dizziness. Negative for weakness and numbness.   Hematological: Does not bruise/bleed easily.   All other systems reviewed and are negative.       Physical Exam     Initial Vitals [02/12/20 1445]   BP Pulse Resp Temp SpO2   (!) 152/86 (!) 122 18 98.4 °F (36.9 °C) 100 %      MAP       --          Physical Exam  Nursing Notes and Vital Signs Reviewed.  Constitutional: Well-developed and well-nourished. NAD  Head: Atraumatic. Normocephalic.  Eyes: PERRL. EOM intact. Conjunctivae are not pale. No scleral icterus.  ENT: Mucous membranes are moist. Oropharynx is clear and symmetric.    Neck: Supple. Full ROM. No lymphadenopathy.  Cardiovascular: Tachycardic. Regular rhythm. No murmurs, rubs, or gallops. Distal pulses are 2+ and symmetric.  Pulmonary/Chest: No respiratory distress. Clear to auscultation bilaterally. No wheezing or rales.  Abdominal: Soft and non-distended.  There is no tenderness.  No rebound, guarding, or rigidity. Good bowel sounds.  Genitourinary: No CVA tenderness  Musculoskeletal: Moves all extremities. No obvious deformities. No calf tenderness.  Skin: Warm and dry.  Neurological:  Alert, awake, and appropriate.  Normal speech.  No acute focal neurological deficits are  "appreciated.  Psychiatric: Anxious affect. Good eye contact. Appropriate in content.     ED Course   Procedures  ED Vital Signs:  Vitals:    02/12/20 1612 02/12/20 1615 02/12/20 1617 02/12/20 1626   BP: 131/89  (!) 205/72 125/80   Pulse: (!) 149 (!) 135 (!) 140 (!) 125   Resp: 18 (!) 22 (!) 22 20   Temp:       TempSrc:       SpO2: 96% 97% 97% 96%   Weight:       Height:        02/12/20 1630 02/12/20 1645 02/12/20 1700 02/12/20 1715   BP: (!) 145/70 102/65 (!) 159/76 (!) 163/85   Pulse: (!) 114 106 109 (!) 111   Resp: (!) 22 (!) 21 (!) 22 15   Temp:       TempSrc:       SpO2: 96% 95% 97% 97%   Weight:       Height:        02/12/20 1730 02/12/20 1745 02/12/20 1746 02/12/20 1800   BP: (!) 151/76  (!) 170/78 (!) 143/67   Pulse: 96 73 73 71   Resp: 12 20 20 20   Temp:       TempSrc:       SpO2: 96% 96% 96% 98%   Weight:       Height:        02/12/20 1831 02/12/20 1906 02/12/20 1943   BP: (!) 141/68 (!) 141/77    Pulse: 73 77    Resp: 20 18    Temp:  97.8 °F (36.6 °C)    TempSrc:  Oral    SpO2: 96% 98%    Weight:   76.1 kg (167 lb 12.3 oz)   Height:   5' 4" (1.626 m)       Abnormal Lab Results:  Labs Reviewed   CBC W/ AUTO DIFFERENTIAL - Abnormal; Notable for the following components:       Result Value    Mean Corpuscular Hemoglobin 31.3 (*)     All other components within normal limits   COMPREHENSIVE METABOLIC PANEL - Abnormal; Notable for the following components:    Potassium 5.6 (*)     CO2 21 (*)     Glucose 133 (*)     BUN, Bld 25 (*)     eGFR if  43 (*)     eGFR if non  37 (*)     All other components within normal limits   TROPONIN I - Abnormal; Notable for the following components:    Troponin I 0.075 (*)     All other components within normal limits   B-TYPE NATRIURETIC PEPTIDE - Abnormal; Notable for the following components:     (*)     All other components within normal limits   PROTIME-INR   APTT   MAGNESIUM   POTASSIUM        All Lab Results:  Results for orders " placed or performed during the hospital encounter of 02/12/20   CBC auto differential   Result Value Ref Range    WBC 7.42 3.90 - 12.70 K/uL    RBC 4.32 4.00 - 5.40 M/uL    Hemoglobin 13.5 12.0 - 16.0 g/dL    Hematocrit 42.1 37.0 - 48.5 %    Mean Corpuscular Volume 98 82 - 98 fL    Mean Corpuscular Hemoglobin 31.3 (H) 27.0 - 31.0 pg    Mean Corpuscular Hemoglobin Conc 32.1 32.0 - 36.0 g/dL    RDW 13.2 11.5 - 14.5 %    Platelets 252 150 - 350 K/uL    MPV 10.0 9.2 - 12.9 fL    Immature Granulocytes 0.1 0.0 - 0.5 %    Gran # (ANC) 4.9 1.8 - 7.7 K/uL    Immature Grans (Abs) 0.01 0.00 - 0.04 K/uL    Lymph # 1.6 1.0 - 4.8 K/uL    Mono # 0.8 0.3 - 1.0 K/uL    Eos # 0.1 0.0 - 0.5 K/uL    Baso # 0.05 0.00 - 0.20 K/uL    nRBC 0 0 /100 WBC    Gran% 65.5 38.0 - 73.0 %    Lymph% 21.4 18.0 - 48.0 %    Mono% 10.4 4.0 - 15.0 %    Eosinophil% 1.9 0.0 - 8.0 %    Basophil% 0.7 0.0 - 1.9 %    Differential Method Automated    Comprehensive metabolic panel   Result Value Ref Range    Sodium 139 136 - 145 mmol/L    Potassium 5.6 (H) 3.5 - 5.1 mmol/L    Chloride 106 95 - 110 mmol/L    CO2 21 (L) 23 - 29 mmol/L    Glucose 133 (H) 70 - 110 mg/dL    BUN, Bld 25 (H) 8 - 23 mg/dL    Creatinine 1.3 0.5 - 1.4 mg/dL    Calcium 9.7 8.7 - 10.5 mg/dL    Total Protein 8.0 6.0 - 8.4 g/dL    Albumin 3.8 3.5 - 5.2 g/dL    Total Bilirubin 0.5 0.1 - 1.0 mg/dL    Alkaline Phosphatase 76 55 - 135 U/L    AST 33 10 - 40 U/L    ALT 19 10 - 44 U/L    Anion Gap 12 8 - 16 mmol/L    eGFR if African American 43 (A) >60 mL/min/1.73 m^2    eGFR if non African American 37 (A) >60 mL/min/1.73 m^2   Troponin I #1   Result Value Ref Range    Troponin I 0.075 (H) 0.000 - 0.026 ng/mL   B-Type natriuretic peptide (BNP)   Result Value Ref Range     (H) 0 - 99 pg/mL   Protime-INR   Result Value Ref Range    Prothrombin Time 10.7 9.0 - 12.5 sec    INR 1.0 0.8 - 1.2   APTT   Result Value Ref Range    aPTT 27.8 21.0 - 32.0 sec   Magnesium   Result Value Ref Range     Magnesium 2.1 1.6 - 2.6 mg/dL     *Note: Due to a large number of results and/or encounters for the requested time period, some results have not been displayed. A complete set of results can be found in Results Review.         Imaging Results          X-Ray Chest AP Portable (Final result)  Result time 02/12/20 15:33:44    Final result by Darell Merritt III, MD (02/12/20 15:33:44)                 Impression:      Borderline heart size.  Clear lungs..      Electronically signed by: Darell Merritt MD  Date:    02/12/2020  Time:    15:33             Narrative:    EXAMINATION:  XR CHEST AP PORTABLE    CLINICAL HISTORY:  tachycardia;    COMPARISON:  October    FINDINGS:  No change.  Heart size upper limits of normal.  Lungs are clear.  Left shoulder prosthesis noted.                                 The EKG was ordered, reviewed, and independently interpreted by the ED provider.  Interpretation time: 1525  Rate: 119 BPM  Rhythm: atrial flutter with variable AV block  Interpretation: rightward axis. Nonspecific ST and T wave abnormality. No STEMI.      The Emergency Provider reviewed the vital signs and test results, which are outlined above.     ED Discussion     4:49 PM: Dr. Ramirez (Cardiology) informed of plan for admit to hospital medicine.    4:53 PM: Discussed case with Kateryna Tran NP (Hospital Medicine). Dr. Esposito agrees with current care and management of pt and accepts admission.   Admitting Service: Hospital Medicine  Admit to: obs / tele    Re-evaluated pt. I have discussed test results, shared treatment plan, and the need for admission with patient and family at bedside. Pt and family express understanding at this time and agree with all information. All questions answered. Pt and family have no further questions or concerns at this time. Pt is ready for admit.    4:57 PM: Discussed pt's case with Dr. Ramirez (Cardiology) who recommends starting amiodarone.       MDM        Medical Decision Making:    Clinical Tests:   Lab Tests: Ordered and Reviewed  Radiological Study: Ordered and Reviewed  Medical Tests: Reviewed and Ordered           ED Medication(s):  Medications   amiodarone 360 mg/200 mL (1.8 mg/mL) infusion (1 mg/min Intravenous New Bag 2/12/20 1741)   amiodarone 360 mg/200 mL (1.8 mg/mL) infusion (has no administration in time range)   ALPRAZolam tablet 0.5 mg (has no administration in time range)   doxazosin tablet 4 mg (has no administration in time range)   isosorbide mononitrate 24 hr tablet 30 mg (has no administration in time range)   metoprolol tartrate (LOPRESSOR) tablet 25 mg (has no administration in time range)   cloNIDine tablet 0.2 mg (has no administration in time range)   apixaban tablet 2.5 mg (has no administration in time range)   metoprolol injection 5 mg (has no administration in time range)   0.9%  NaCl infusion (has no administration in time range)   metoprolol injection 5 mg (5 mg Intravenous Given 2/12/20 1624)   amiodarone in dextrose 150 mg/100 mL (1.5 mg/mL) loading dose 150 mg (0 mg Intravenous Stopped 2/12/20 1729)       Current Discharge Medication List                  Scribe Attestation:   Scribe #1: I performed the above scribed service and the documentation accurately describes the services I performed. I attest to the accuracy of the note.     Attending:   Physician Attestation Statement for Scribe #1: I, Trang Tobar MD, personally performed the services described in this documentation, as scribed by Molina Allison, in my presence, and it is both accurate and complete.           Clinical Impression       ICD-10-CM ICD-9-CM   1. Atrial flutter with rapid ventricular response I48.92 427.32   2. Tachycardia R00.0 785.0   3. Chronic anticoagulation Z79.01 V58.61   4. Elevated troponin R79.89 790.6       Disposition:   Disposition: Placed in Observation  Condition: Fair         Trang Tobar MD  02/12/20 2001

## 2020-02-12 NOTE — TELEPHONE ENCOUNTER
Spoke to pt - she is awaiting son who will take her to ED.  had taken her doxazosyn Monday night at 10 PM and felt bad at 2 AM - nausea, weak. This resolved over time and she took the doxazosyn again last night.  heart has not stopped beating fast and BP low. 150 HR. She is now lying down. Advised continue with plan to be seen at ED for elevated HR, etc.

## 2020-02-12 NOTE — HPI
Ms. Bettencourt is an 84yo female with a PMHx of resistant HTN, HLD, metabolic syndrome, and CKD, who presented to the ED with c/o bilateral upper arm tightness secondary to persistently elevated blood pressure.  No aggravating or alleviating factors.  Denies any CP, palpitations, SOB/RUIZ, orthopnea, PND, edema, cough, ABD pain, N/V, diaphoresis, lightheadedness/dizziness, syncope, visual disturbances, HA, AMS, or fever.  She states being compliant with all BP medications.  She recently stopped her verapamil due to dry eyes and mouth, and constipation.  She has h/o intolerance to multiple medications due to side effects.  Upon arrival to ED, /67.  Troponin 0.033, , EKG unrevealing, CXR unremarkable.  Subsequently, patient was admitted to r/o AMI and BP monitoring under Hospital Medicine services.  Currently, patient appears comfortable in NAD.  Denies any CP, upper extremity pain/tightness, or SOB.   Pt had colposcopy 02/11/20

## 2020-02-12 NOTE — HPI
Shirley Metz is a 87 y.o. female with AFib on Eliquis s/p STACEY/DCCV, HFPEF, h/o SELVIN thrombus, resistant HTN, HLD, metabolic syndrome, and CKD here in Emergency Department for evaluation of SOB which onset 2 days ago. Info obtained from chart and pt.  Symptoms are constant and moderate in severity. No mitigating or exacerbating factors reported. Associated sxs include dizziness, nausea, and palpitations. Patient denies any fever, cough, CP, emesis, weakness/numbness, and all other sxs at this time. No prior Tx reported. No further complaints or concerns at this time. Patient recently started on doxazosin by PCP. Patient states she has taken 2 doses and thinks current sx are a reaction to doxazosin.   Cardiology consulted for rapid Afib. Discussed will start Amio drip and may repeat DCCV if she does not convert. Has been on Eliquis.

## 2020-02-12 NOTE — SUBJECTIVE & OBJECTIVE
Past Medical History:   Diagnosis Date    Anemia 11/29/2018    Anxiety 11/28/2017    Arthritis     Atrial fibrillation with RVR 10/9/2019    Back pain     Basal cell carcinoma 03/29/2018    left mid back    Chronic diastolic congestive heart failure 10/15/2019    Colon polyps     reported per patient.     Fuchs' corneal dystrophy     General anesthetics causing adverse effect in therapeutic use     woke up during surgery and gagged on ET tube    Glaucoma     Glaucoma     Heart failure with preserved left ventricular function (HFpEF) 7/24/2018    Hyperlipidemia     Hypertension     Macular degeneration dry    Obesity     Squamous cell carcinoma 01/08/2019    left shoulder    Trouble in sleeping     Urinary tract infection        Past Surgical History:   Procedure Laterality Date    ADENOIDECTOMY  1938    BREAST BIOPSY Left     1997. benign    BREAST CYST EXCISION Left 1997 approx    CARPAL TUNNEL RELEASE Right 2012 approx    CATARACT EXTRACTION Bilateral 1994    CHOLECYSTECTOMY  1975    CORNEAL TRANSPLANT Bilateral 08/2015 8/2015 - left; Right 4/2016    EYE SURGERY      Fuch's Corneal dystrophy - had 2nd operation with Dr. Quintanilla    EYE SURGERY      Cataract wIOL    left thumb Left 2011    removed cuboid for arthritis    REVERSE TOTAL SHOULDER ARTHROPLASTY Left 8/21/2018    Procedure: ARTHROPLASTY, SHOULDER, TOTAL, REVERSE;  Surgeon: Solomon Lujan MD;  Location: [facility] OR;  Service: Orthopedics;  Laterality: Left;  WITH BICEP TENODESIS    SKIN CANCER EXCISION Left 2017    BCC removal by Dr. Valadez    SLT- OS (aka TRABECULOPLASTY) Left 05/11/2011    repeat 3/14/12    TENOTOMY Left 8/21/2018    Procedure: TENOTOMY;  Surgeon: Solomon Lujan MD;  Location: [facility] OR;  Service: Orthopedics;  Laterality: Left;    TONSILLECTOMY  1938    TREATMENT OF CARDIAC ARRHYTHMIA N/A 10/10/2019    Procedure: CARDIOVERSION;  Surgeon: Pete Durán MD;  Location: [facility] CATH LAB;  Service:  Cardiology;  Laterality: N/A;    TREATMENT OF CARDIAC ARRHYTHMIA N/A 12/6/2019    Procedure: CARDIOVERSION;  Surgeon: Samy Castillo MD;  Location[facility] C[facility] LAB;  Service: Cardiology;  Laterality: N/A;       Review of patient's allergies indicates:   Allergen Reactions    Claritin [loratadine] Other (See Comments)     Double vision/spots    Hydrocodone-acetaminophen      wheezing    Labetalol Shortness Of Breath, Nausea Only and Other (See Comments)     Palpitations, LE weakness    Naproxen      wheezing    Verapamil Diarrhea    Vibramycin [doxycycline calcium] Other (See Comments)     Weakness nausea   sob    Amlodipine      Myalgias      Coreg [carvedilol] Swelling     lips    Fenofibrate      Causes muscle weakness    Hydralazine analogues      Muscle tremors/aches      Isosorbide     Lasix [furosemide]      myalgias    Moxifloxacin Other (See Comments)     Hives   muscle soreness    Timolol     Adhesive Rash     Clonidine patch - 2 mfg - contact dermatitis    Allegra [fexofenadine] Other (See Comments)     Double vision/spots     Codeine Diarrhea and Other (See Comments)     Loss of balance     Erythromycin Other (See Comments)     Complete weakness/could not walk    Hydrocortisone (bulk) Other (See Comments)     Only suppository/ caused muscle weakness    Macrolide antibiotics Other (See Comments)     Complete weakness/could not walk    Patanol [olopatadine] Other (See Comments)     Muscle weakness    Prednisone Anxiety    Statins-hmg-coa reductase inhibitors Other (See Comments)     Muscle weakness    Xalatan [latanoprost] Other (See Comments)     Muscle weakness       Current Facility-Administered Medications on File Prior to Encounter   Medication    sodium chloride 0.9% flush 3 mL     Current Outpatient Medications on File Prior to Encounter   Medication Sig    acetaminophen (TYLENOL) 500 MG tablet Take 500 mg by mouth daily as needed. Taking 1 to 3    ALPRAZolam (XANAX) 0.5 MG  tablet TAKE 1/2 - 1 TABLET BY MOUTH NIGHTLY FOR SLEEP OR ANXIETY    apixaban (ELIQUIS) 2.5 mg Tab Take 1 tablet (2.5 mg total) by mouth 2 (two) times daily.    cholecalciferol, vitamin D3, (VITAMIN D3) 2,000 unit Cap Take 1 capsule by mouth once daily.    cloNIDine (CATAPRES) 0.1 MG tablet Take 2 tablets (0.2 mg total) by mouth 3 (three) times daily as needed (for BP more than 170/100).    coenzyme Q10 200 mg capsule Take 400 mg by mouth once daily.     doxazosin (CARDURA) 4 MG tablet Take 1 tablet (4 mg total) by mouth every evening.    fish oil-omega-3 fatty acids 300-1,000 mg capsule Take 2 g by mouth 2 (two) times daily.     isosorbide mononitrate (IMDUR) 30 MG 24 hr tablet Take 1 tablet (30 mg total) by mouth once daily.    LOTEMAX 0.5 % DrpG     metoprolol tartrate (LOPRESSOR) 25 MG tablet Take 1 tablet (25 mg total) by mouth 2 (two) times daily.    POLYETHYLENE GLYCOL 3350 (MIRALAX ORAL) Take by mouth as needed. Taking BID    RHOPRESSA 0.02 % Drop once daily.     spironolactone (ALDACTONE) 25 MG tablet Take 1 tablet (25 mg total) by mouth once daily.    TRAVATAN Z 0.004 % ophthalmic solution PLACE 1 DROP INTO THE LEFT EYE EVERY EVENING.    losartan (COZAAR) 50 MG tablet Take 2 tablets (100 mg total) by mouth once daily.     Family History     Problem Relation (Age of Onset)    Cancer Father (88)    Heart disease Mother    Hypertension Mother        Tobacco Use    Smoking status: Never Smoker    Smokeless tobacco: Never Used    Tobacco comment: history of passive smoking from her ex-   Substance and Sexual Activity    Alcohol use: Not Currently     Alcohol/week: 2.0 standard drinks     Types: 2 Standard drinks or equivalent per week     Frequency: Monthly or less     Drinks per session: 1 or 2     Binge frequency: Never     Comment: occasional     Drug use: No    Sexual activity: Not Currently     Partners: Male     Birth control/protection: Post-menopausal     Comment: discussed  protection for STD's     Review of Systems   Constitutional: Negative for activity change, appetite change, chills, fatigue and fever.   HENT: Negative for congestion, dental problem, ear pain, hearing loss, mouth sores, sinus pressure, sore throat, tinnitus and trouble swallowing.    Eyes: Negative for pain, discharge, redness and visual disturbance.   Respiratory: Positive for shortness of breath. Negative for apnea, cough, choking, chest tightness and wheezing.    Cardiovascular: Positive for palpitations. Negative for chest pain and leg swelling.   Gastrointestinal: Positive for nausea. Negative for abdominal distention, abdominal pain, anal bleeding, blood in stool, constipation, diarrhea, rectal pain and vomiting.   Endocrine: Negative for cold intolerance, heat intolerance, polydipsia and polyuria.   Genitourinary: Negative for difficulty urinating, dysuria, flank pain, frequency, hematuria and urgency.   Musculoskeletal: Negative for arthralgias, back pain, gait problem, joint swelling, myalgias, neck pain and neck stiffness.   Skin: Negative for color change, rash and wound.   Allergic/Immunologic: Negative for food allergies and immunocompromised state.   Neurological: Positive for dizziness. Negative for tremors, seizures, syncope, speech difficulty, light-headedness and headaches.   Hematological: Negative.    Psychiatric/Behavioral: Negative for agitation, behavioral problems, confusion and sleep disturbance. The patient is not nervous/anxious.    All other systems reviewed and are negative.    Objective:     Vital Signs (Most Recent):  Temp: 98.4 °F (36.9 °C) (02/12/20 1445)  Pulse: 106 (02/12/20 1645)  Resp: (!) 21 (02/12/20 1645)  BP: 102/65 (02/12/20 1645)  SpO2: 95 % (02/12/20 1645) Vital Signs (24h Range):  Temp:  [98.4 °F (36.9 °C)] 98.4 °F (36.9 °C)  Pulse:  [106-149] 106  Resp:  [18-22] 21  SpO2:  [94 %-100 %] 95 %  BP: (102-205)/(64-91) 102/65     Weight: 74.8 kg (165 lb)  Body mass index is  28.32 kg/m².    Physical Exam   Constitutional: She is oriented to person, place, and time. She appears well-developed and well-nourished. No distress.   HENT:   Head: Normocephalic and atraumatic.   Nose: Nose normal.   Mouth/Throat: Oropharynx is clear and moist.   Eyes: Pupils are equal, round, and reactive to light. EOM are normal. Right eye exhibits no discharge. Left eye exhibits no discharge.   Neck: Normal range of motion. Neck supple. No JVD present. No tracheal deviation present. No thyromegaly present.   Cardiovascular: Normal heart sounds and intact distal pulses. An irregularly irregular rhythm present. Tachycardia present. Exam reveals no gallop and no friction rub.   No murmur heard.  Pulmonary/Chest: Effort normal and breath sounds normal. Tachypnea noted. No respiratory distress. She has no wheezes. She has no rales. She exhibits no tenderness.   Abdominal: Soft. Bowel sounds are normal. She exhibits no distension and no mass. There is no tenderness.   Genitourinary:   Genitourinary Comments: deferred   Musculoskeletal: Normal range of motion. She exhibits no edema, tenderness or deformity.   Neurological: She is alert and oriented to person, place, and time. No cranial nerve deficit. She exhibits normal muscle tone. Coordination normal.   Skin: Skin is warm and dry. Capillary refill takes less than 2 seconds. No rash noted. She is not diaphoretic. No erythema. No pallor.   Psychiatric: She has a normal mood and affect. Her behavior is normal.   Nursing note and vitals reviewed.        CRANIAL NERVES     CN III, IV, VI   Pupils are equal, round, and reactive to light.  Extraocular motions are normal.        Significant Labs:   Recent Lab Results       02/12/20  1530        Albumin 3.8     Alkaline Phosphatase 76     ALT 19     Anion Gap 12     aPTT 27.8  Comment:  aPTT therapeutic range = 39-69 seconds     AST 33     Baso # 0.05     Basophil% 0.7     BILIRUBIN TOTAL 0.5  Comment:  For infants and  newborns, interpretation of results should be based  on gestational age, weight and in agreement with clinical  observations.  Premature Infant recommended reference ranges:  Up to 24 hours.............<8.0 mg/dL  Up to 48 hours............<12.0 mg/dL  3-5 days..................<15.0 mg/dL  6-29 days.................<15.0 mg/dL         Comment:  Values of less than 100 pg/ml are consistent with non-CHF populations.     BUN, Bld 25     Calcium 9.7     Chloride 106     CO2 21     Creatinine 1.3     Differential Method Automated     eGFR if  43     eGFR if non  37  Comment:  Calculation used to obtain the estimated glomerular filtration  rate (eGFR) is the CKD-EPI equation.        Eos # 0.1     Eosinophil% 1.9     Glucose 133     Gran # (ANC) 4.9     Gran% 65.5     Hematocrit 42.1     Hemoglobin 13.5     Immature Grans (Abs) 0.01  Comment:  Mild elevation in immature granulocytes is non specific and   can be seen in a variety of conditions including stress response,   acute inflammation, trauma and pregnancy. Correlation with other   laboratory and clinical findings is essential.       Immature Granulocytes 0.1     INR 1.0  Comment:  Coumadin Therapy:  2.0 - 3.0 for INR for all indicators except mechanical heart valves  and antiphospholipid syndromes which should use 2.5 - 3.5.       Lymph # 1.6     Lymph% 21.4     Magnesium 2.1     MCH 31.3     MCHC 32.1     MCV 98     Mono # 0.8     Mono% 10.4     MPV 10.0     nRBC 0     Platelets 252     Potassium 5.6     PROTEIN TOTAL 8.0     Protime 10.7     RBC 4.32     RDW 13.2     Sodium 139     Troponin I 0.075  Comment:  The reference interval for Troponin I represents the 99th percentile   cutoff   for our facility and is consistent with 3rd generation assay   performance.       WBC 7.42         All pertinent labs within the past 24 hours have been reviewed.    Significant Imaging: I have reviewed all pertinent imaging results/findings  within the past 24 hours.

## 2020-02-12 NOTE — CONSULTS
[facility]  Cardiology  Consult Note    Patient Name: Shirley Metz  MRN: [id number]  Admission Date: 2/12/2020  Hospital Length of Stay: 0 days  Code Status: Prior   Attending Provider: Trang Tobar MD   Consulting Provider: Trevon Ramirez Md, MD  Primary Care Physician: Yandy Terrell MD  Principal Problem:<principal problem not specified>    Patient information was obtained from patient, past medical records and ER records.     Inpatient consult to Cardiology  Consult performed by: Trevon Ramirez MD  Consult ordered by: Trang Tobar MD  Reason for consult: Afib V RVR        Subjective:     Chief Complaint:  SOB, palpitations, weakness     HPI:   Shirley Metz is a 87 y.o. female with AFib on Eliquis s/p STACEY/DCCV, HFPEF, h/o SELVIN thrombus, resistant HTN, HLD, metabolic syndrome, and CKD here in Emergency Department for evaluation of SOB which onset 2 days ago. Info obtained from chart and pt.  Symptoms are constant and moderate in severity. No mitigating or exacerbating factors reported. Associated sxs include dizziness, nausea, and palpitations. Patient denies any fever, cough, CP, emesis, weakness/numbness, and all other sxs at this time. No prior Tx reported. No further complaints or concerns at this time. Patient recently started on doxazosin by PCP. Patient states she has taken 2 doses and thinks current sx are a reaction to doxazosin.   Cardiology consulted for rapid Afib. Discussed will start Amio drip and may repeat DCCV if she does not convert. Has been on Eliquis.     Past Medical History:   Diagnosis Date    Anemia 11/29/2018    Anxiety 11/28/2017    Arthritis     Atrial fibrillation with RVR 10/9/2019    Back pain     Basal cell carcinoma 03/29/2018    left mid back    Chronic diastolic congestive heart failure 10/15/2019    Colon polyps     reported per patient.     Fuchs' corneal dystrophy     General anesthetics causing adverse effect in  therapeutic use     woke up during surgery and gagged on ET tube    Glaucoma     Glaucoma     Heart failure with preserved left ventricular function (HFpEF) 7/24/2018    Hyperlipidemia     Hypertension     Macular degeneration dry    Obesity     Squamous cell carcinoma 01/08/2019    left shoulder    Trouble in sleeping     Urinary tract infection        Past Surgical History:   Procedure Laterality Date    ADENOIDECTOMY  1938    BREAST BIOPSY Left     1997. benign    BREAST CYST EXCISION Left 1997 approx    CARPAL TUNNEL RELEASE Right 2012 approx    CATARACT EXTRACTION Bilateral 1994    CHOLECYSTECTOMY  1975    CORNEAL TRANSPLANT Bilateral 08/2015 8/2015 - left; Right 4/2016    EYE SURGERY      Fuch's Corneal dystrophy - had 2nd operation with Dr. Quintanilla    EYE SURGERY      Cataract wIOL    left thumb Left 2011    removed cuboid for arthritis    REVERSE TOTAL SHOULDER ARTHROPLASTY Left 8/21/2018    Procedure: ARTHROPLASTY, SHOULDER, TOTAL, REVERSE;  Surgeon: Solomon Lujan MD;  Location: [facility] OR;  Service: Orthopedics;  Laterality: Left;  WITH BICEP TENODESIS    SKIN CANCER EXCISION Left 2017    BCC removal by Dr. Valadez    SLT- OS (aka TRABECULOPLASTY) Left 05/11/2011    repeat 3/14/12    TENOTOMY Left 8/21/2018    Procedure: TENOTOMY;  Surgeon: Solomon Lujan MD;  Location: [facility] OR;  Service: Orthopedics;  Laterality: Left;    TONSILLECTOMY  1938    TREATMENT OF CARDIAC ARRHYTHMIA N/A 10/10/2019    Procedure: CARDIOVERSION;  Surgeon: Pete Durán MD;  Location: [facility] CATH LAB;  Service: Cardiology;  Laterality: N/A;    TREATMENT OF CARDIAC ARRHYTHMIA N/A 12/6/2019    Procedure: CARDIOVERSION;  Surgeon: Samy Castillo MD;  Location: [facility] CATH LAB;  Service: Cardiology;  Laterality: N/A;       Review of patient's allergies indicates:   Allergen Reactions    Claritin [loratadine] Other (See Comments)     Double vision/spots    Hydrocodone-acetaminophen      wheezing    Labetalol  Shortness Of Breath, Nausea Only and Other (See Comments)     Palpitations, LE weakness    Naproxen      wheezing    Verapamil Diarrhea    Vibramycin [doxycycline calcium] Other (See Comments)     Weakness nausea   sob    Amlodipine      Myalgias      Coreg [carvedilol] Swelling     lips    Fenofibrate      Causes muscle weakness    Hydralazine analogues      Muscle tremors/aches      Isosorbide     Lasix [furosemide]      myalgias    Moxifloxacin Other (See Comments)     Hives   muscle soreness    Timolol     Adhesive Rash     Clonidine patch - 2 mfg - contact dermatitis    Allegra [fexofenadine] Other (See Comments)     Double vision/spots     Codeine Diarrhea and Other (See Comments)     Loss of balance     Erythromycin Other (See Comments)     Complete weakness/could not walk    Hydrocortisone (bulk) Other (See Comments)     Only suppository/ caused muscle weakness    Macrolide antibiotics Other (See Comments)     Complete weakness/could not walk    Patanol [olopatadine] Other (See Comments)     Muscle weakness    Prednisone Anxiety    Statins-hmg-coa reductase inhibitors Other (See Comments)     Muscle weakness    Xalatan [latanoprost] Other (See Comments)     Muscle weakness       Current Facility-Administered Medications on File Prior to Encounter   Medication    sodium chloride 0.9% flush 3 mL     Current Outpatient Medications on File Prior to Encounter   Medication Sig    acetaminophen (TYLENOL) 500 MG tablet Take 500 mg by mouth daily as needed. Taking 1 to 3    ALPRAZolam (XANAX) 0.5 MG tablet TAKE 1/2 - 1 TABLET BY MOUTH NIGHTLY FOR SLEEP OR ANXIETY    apixaban (ELIQUIS) 2.5 mg Tab Take 1 tablet (2.5 mg total) by mouth 2 (two) times daily.    cholecalciferol, vitamin D3, (VITAMIN D3) 2,000 unit Cap Take 1 capsule by mouth once daily.    cloNIDine (CATAPRES) 0.1 MG tablet Take 2 tablets (0.2 mg total) by mouth 3 (three) times daily as needed (for BP more than 170/100).     coenzyme Q10 200 mg capsule Take 400 mg by mouth once daily.     doxazosin (CARDURA) 4 MG tablet Take 1 tablet (4 mg total) by mouth every evening.    fish oil-omega-3 fatty acids 300-1,000 mg capsule Take 2 g by mouth 2 (two) times daily.     isosorbide mononitrate (IMDUR) 30 MG 24 hr tablet Take 1 tablet (30 mg total) by mouth once daily.    LOTEMAX 0.5 % DrpG     metoprolol tartrate (LOPRESSOR) 25 MG tablet Take 1 tablet (25 mg total) by mouth 2 (two) times daily.    POLYETHYLENE GLYCOL 3350 (MIRALAX ORAL) Take by mouth as needed. Taking BID    RHOPRESSA 0.02 % Drop once daily.     spironolactone (ALDACTONE) 25 MG tablet Take 1 tablet (25 mg total) by mouth once daily.    TRAVATAN Z 0.004 % ophthalmic solution PLACE 1 DROP INTO THE LEFT EYE EVERY EVENING.    losartan (COZAAR) 50 MG tablet Take 2 tablets (100 mg total) by mouth once daily.     Family History     Problem Relation (Age of Onset)    Cancer Father (88)    Heart disease Mother    Hypertension Mother        Tobacco Use    Smoking status: Never Smoker    Smokeless tobacco: Never Used    Tobacco comment: history of passive smoking from her ex-   Substance and Sexual Activity    Alcohol use: Not Currently     Alcohol/week: 2.0 standard drinks     Types: 2 Standard drinks or equivalent per week     Frequency: Monthly or less     Drinks per session: 1 or 2     Binge frequency: Never     Comment: occasional     Drug use: No    Sexual activity: Not Currently     Partners: Male     Birth control/protection: Post-menopausal     Comment: discussed protection for STD's     Review of Systems   Constitution: Positive for chills, decreased appetite and malaise/fatigue.   HENT: Negative.    Eyes: Negative.    Cardiovascular: Positive for dyspnea on exertion, irregular heartbeat, orthopnea and palpitations.   Respiratory: Positive for shortness of breath.    Endocrine: Negative.    Hematologic/Lymphatic: Negative.    Skin: Negative.     Musculoskeletal: Negative.    Gastrointestinal: Negative.    Genitourinary: Negative.    Neurological: Negative.    Psychiatric/Behavioral: Negative.    Allergic/Immunologic: Negative.    All other systems reviewed and are negative.    Objective:     Vital Signs (Most Recent):  Temp: 98.4 °F (36.9 °C) (02/12/20 1445)  Pulse: 106 (02/12/20 1645)  Resp: (!) 21 (02/12/20 1645)  BP: 102/65 (02/12/20 1645)  SpO2: 95 % (02/12/20 1645) Vital Signs (24h Range):  Temp:  [98.4 °F (36.9 °C)] 98.4 °F (36.9 °C)  Pulse:  [106-149] 106  Resp:  [18-22] 21  SpO2:  [94 %-100 %] 95 %  BP: (102-205)/(64-91) 102/65     Weight: 74.8 kg (165 lb)  Body mass index is 28.32 kg/m².    SpO2: 95 %  O2 Device (Oxygen Therapy): room air    No intake or output data in the 24 hours ending 02/12/20 1708    Lines/Drains/Airways     Peripheral Intravenous Line                 Peripheral IV - Single Lumen 02/12/20 1530 20 G Left Hand less than 1 day                Physical Exam   Constitutional: She is oriented to person, place, and time. She appears well-developed and well-nourished. No distress.   HENT:   Head: Normocephalic and atraumatic.   Nose: Nose normal.   Mouth/Throat: Oropharynx is clear and moist.   Eyes: Conjunctivae and EOM are normal. No scleral icterus.   Neck: Normal range of motion. Neck supple. No JVD present. No thyromegaly present.   Cardiovascular: S1 normal and S2 normal. An irregularly irregular rhythm present. Tachycardia present. Exam reveals no gallop, no S3, no S4 and no friction rub.   Murmur heard.  Pulmonary/Chest: Effort normal and breath sounds normal. No stridor. No respiratory distress. She has no wheezes. She has no rales. She exhibits no tenderness.   Abdominal: Soft. Bowel sounds are normal. She exhibits no distension and no mass. There is no tenderness. There is no rebound.   Genitourinary:   Genitourinary Comments: Deferred   Musculoskeletal: Normal range of motion. She exhibits no edema, tenderness or  deformity.   Lymphadenopathy:     She has no cervical adenopathy.   Neurological: She is alert and oriented to person, place, and time. She exhibits normal muscle tone. Coordination normal.   Skin: Skin is warm and dry. No rash noted. She is not diaphoretic. No erythema. No pallor.   Psychiatric: She has a normal mood and affect. Her behavior is normal. Judgment and thought content normal.   Nursing note and vitals reviewed.      Significant Labs:   All pertinent lab results from the last 24 hours have been reviewed. and   Recent Lab Results       02/12/20  1530        Albumin 3.8     Alkaline Phosphatase 76     ALT 19     Anion Gap 12     aPTT 27.8  Comment:  aPTT therapeutic range = 39-69 seconds     AST 33     Baso # 0.05     Basophil% 0.7     BILIRUBIN TOTAL 0.5  Comment:  For infants and newborns, interpretation of results should be based  on gestational age, weight and in agreement with clinical  observations.  Premature Infant recommended reference ranges:  Up to 24 hours.............<8.0 mg/dL  Up to 48 hours............<12.0 mg/dL  3-5 days..................<15.0 mg/dL  6-29 days.................<15.0 mg/dL         Comment:  Values of less than 100 pg/ml are consistent with non-CHF populations.     BUN, Bld 25     Calcium 9.7     Chloride 106     CO2 21     Creatinine 1.3     Differential Method Automated     eGFR if  43     eGFR if non  37  Comment:  Calculation used to obtain the estimated glomerular filtration  rate (eGFR) is the CKD-EPI equation.        Eos # 0.1     Eosinophil% 1.9     Glucose 133     Gran # (ANC) 4.9     Gran% 65.5     Hematocrit 42.1     Hemoglobin 13.5     Immature Grans (Abs) 0.01  Comment:  Mild elevation in immature granulocytes is non specific and   can be seen in a variety of conditions including stress response,   acute inflammation, trauma and pregnancy. Correlation with other   laboratory and clinical findings is essential.        Immature Granulocytes 0.1     INR 1.0  Comment:  Coumadin Therapy:  2.0 - 3.0 for INR for all indicators except mechanical heart valves  and antiphospholipid syndromes which should use 2.5 - 3.5.       Lymph # 1.6     Lymph% 21.4     Magnesium 2.1     MCH 31.3     MCHC 32.1     MCV 98     Mono # 0.8     Mono% 10.4     MPV 10.0     nRBC 0     Platelets 252     Potassium 5.6     PROTEIN TOTAL 8.0     Protime 10.7     RBC 4.32     RDW 13.2     Sodium 139     Troponin I 0.075  Comment:  The reference interval for Troponin I represents the 99th percentile   cutoff   for our facility and is consistent with 3rd generation assay   performance.       WBC 7.42           Significant Imaging: Echocardiogram:   2D echo with color flow doppler:   Results for orders placed or performed during the hospital encounter of 10/09/19   2D echo with color flow doppler   Result Value Ref Range    QEF 55 55 - 65    Mitral Valve Regurgitation MILD TO MODERATE     Diastolic Dysfunction No     Est. PA Systolic Pressure 40.04 (A)     Pericardial Effusion TRIVIAL     Tricuspid Valve Regurgitation MILD     Narrative    Date of Procedure: 10/09/2019        TEST DESCRIPTION   Technical Quality: This is a technically challenging study. There is poor endocardial definition.     General: The patient was in an irregularly irregular rhythm throughout the study.     Aorta: The aortic root is normal in size, measuring 2.9 cm at sinotubular junction and 3.4 cm at Sinuses of Valsalva. The proximal ascending aorta is normal in size, measuring 2.9 cm across.     Left Atrium: The left atrial volume index is normal, measuring 22.02 cc/m2.     Left Ventricle: The left ventricle is normal in size, with an end-diastolic diameter of 3.8 cm, and an end-systolic diameter of 2.8 cm. Wall thickness is increased, with the septum measuring 1.7 cm and the posterior wall measuring 1.6 cm across. Relative   wall thickness was increased at 0.84, and the LV mass index was  increased at 164.8 g/m2 consistent with concentric left ventricular hypertrophy. There are no regional wall motion abnormalities. Left ventricular systolic function appears normal. Visually   estimated ejection fraction is 55-60%. The LV Doppler derived stroke volume equals 60.0 ccs.     Diastolic indices: E wave velocity 1.0 m/s, E/A ratio 4.8,  msec., E/e' ratio(avg) 8. Diastolic function is normal.     Right Atrium: The right atrium is normal in size, measuring 3.9 cm in length in the apical view.     Right Ventricle: The right ventricle is normal in size. Global right ventricular systolic function appears normal. Tricuspid annular plane systolic excursion (TAPSE) is 1.5 cm. The estimated PA systolic pressure is 40 mmHg.     Aortic Valve:  The aortic valve is normal in structure. The mean gradient obtained across the aortic valve is 7 mmHg.     Mitral Valve:  The mitral valve is mildly sclerotic. The pressure half time is 62 msec. The calculated mitral valve area is 3.55 cm2. There is mild to moderate mitral regurgitation. There is mitral annular calcification.     Tricuspid Valve:  The tricuspid valve is normal in structure. There is mild tricuspid regurgitation.     Pulmonary Valve:  The pulmonic valve is not well seen.     Pericardium: There is evidence of a trivial pericardial effusion.     IVC: IVC is enlarged but collapses > 50% with a sniff, suggesting intermediate right atrial pressure of 8 mmHg.     Intracavitary: There is no evidence of intracavity mass, thrombi, or vegetation.         CONCLUSIONS     1 - Normal left ventricular systolic function (EF 55-60%).     2 - Normal left ventricular diastolic function.     3 - Normal right ventricular systolic function .     4 - Mild to moderate mitral regurgitation.     5 - Concentric hypertrophy.             This document has been electronically    SIGNED BY: Pete Durán MD On: 10/09/2019 20:00    and X-Ray: CXR: X-Ray Chest 1 View (CXR): No results  found for this visit on 02/12/20.    Assessment and Plan:     Atrial flutter with rapid ventricular response  Afib/Flutter  Will load with IV amio  Cont Eliquis and BB  DCCV on Friday if she does not convert    A-fib  See above    Chronic diastolic congestive heart failure  I/O  Low salt diet  Cont diuretics    Anemia  Cont to monitor    HTN (hypertension)  Cont meds   Will titrate  Difficult to manage due to AE's        VTE Risk Mitigation (From admission, onward)    None          Thank you for your consult. I will follow-up with patient. Please contact us if you have any additional questions.    Trevon Ramirez Md, MD  Cardiology   [facility]

## 2020-02-12 NOTE — TELEPHONE ENCOUNTER
----- Message from Claus Beyer sent at 2/12/2020  8:58 AM CST -----  Contact: Pt   Pt called in regards to canceling appointment. Pt can be reached at [phone] (home). Pt is having a bad reaction to medication.

## 2020-02-12 NOTE — HPI
86 y/o female with PMHx of AF with RVR who presented to the ED with c/o SOB that onset gradually 2 days ago. associated symptoms include palpitations, nausea, and dizziness. Symptoms are constant and moderate in severity. No mitigating or exacerbating factors reported. Patient denies any fever, cough, CP, emesis, weakness/numbness, and all other sxs at this time.  Patient recently started on doxazosin by PCP. Patient states she has taken 2 doses and thinks current sx are a reaction to doxazosin.   She will be kept on OBS for a fib with RVR under the care of [facility].     Advance Care Planning   she is a full code and her SMD is her son, Kirt

## 2020-02-12 NOTE — TELEPHONE ENCOUNTER
Pt calling, states BP is 98/70 with . Per triage protocol, pt advised to dial 911 and then lie down with feet elevated. Pt verbalized understanding.    Reason for Disposition   Shock suspected (e.g., cold/pale/clammy skin, too weak to stand, low BP, rapid pulse)    Additional Information   Negative: Systolic BP < 90 and feeling dizzy, lightheaded, or weak   Negative: Started suddenly after an allergic medicine, an allergic food, or bee sting    Protocols used: LOW BLOOD PRESSURE-A-OH

## 2020-02-12 NOTE — SUBJECTIVE & OBJECTIVE
Past Medical History:   Diagnosis Date    Anemia 11/29/2018    Anxiety 11/28/2017    Arthritis     Atrial fibrillation with RVR 10/9/2019    Back pain     Basal cell carcinoma 03/29/2018    left mid back    Chronic diastolic congestive heart failure 10/15/2019    Colon polyps     reported per patient.     Fuchs' corneal dystrophy     General anesthetics causing adverse effect in therapeutic use     woke up during surgery and gagged on ET tube    Glaucoma     Glaucoma     Heart failure with preserved left ventricular function (HFpEF) 7/24/2018    Hyperlipidemia     Hypertension     Macular degeneration dry    Obesity     Squamous cell carcinoma 01/08/2019    left shoulder    Trouble in sleeping     Urinary tract infection        Past Surgical History:   Procedure Laterality Date    ADENOIDECTOMY  1938    BREAST BIOPSY Left     1997. benign    BREAST CYST EXCISION Left 1997 approx    CARPAL TUNNEL RELEASE Right 2012 approx    CATARACT EXTRACTION Bilateral 1994    CHOLECYSTECTOMY  1975    CORNEAL TRANSPLANT Bilateral 08/2015 8/2015 - left; Right 4/2016    EYE SURGERY      Fuch's Corneal dystrophy - had 2nd operation with Dr. Quintanilla    EYE SURGERY      Cataract wIOL    left thumb Left 2011    removed cuboid for arthritis    REVERSE TOTAL SHOULDER ARTHROPLASTY Left 8/21/2018    Procedure: ARTHROPLASTY, SHOULDER, TOTAL, REVERSE;  Surgeon: Solomon Lujan MD;  Location: [facility] OR;  Service: Orthopedics;  Laterality: Left;  WITH BICEP TENODESIS    SKIN CANCER EXCISION Left 2017    BCC removal by Dr. Valadez    SLT- OS (aka TRABECULOPLASTY) Left 05/11/2011    repeat 3/14/12    TENOTOMY Left 8/21/2018    Procedure: TENOTOMY;  Surgeon: Solomon Lujan MD;  Location: [facility] OR;  Service: Orthopedics;  Laterality: Left;    TONSILLECTOMY  1938    TREATMENT OF CARDIAC ARRHYTHMIA N/A 10/10/2019    Procedure: CARDIOVERSION;  Surgeon: Pete Durán MD;  Location: [facility] CATH LAB;  Service:  Cardiology;  Laterality: N/A;    TREATMENT OF CARDIAC ARRHYTHMIA N/A 12/6/2019    Procedure: CARDIOVERSION;  Surgeon: Samy Castillo MD;  Location: [facility] CATH LAB;  Service: Cardiology;  Laterality: N/A;       Review of patient's allergies indicates:   Allergen Reactions    Claritin [loratadine] Other (See Comments)     Double vision/spots    Hydrocodone-acetaminophen      wheezing    Labetalol Shortness Of Breath, Nausea Only and Other (See Comments)     Palpitations, LE weakness    Naproxen      wheezing    Verapamil Diarrhea    Vibramycin [doxycycline calcium] Other (See Comments)     Weakness nausea   sob    Amlodipine      Myalgias      Coreg [carvedilol] Swelling     lips    Fenofibrate      Causes muscle weakness    Hydralazine analogues      Muscle tremors/aches      Isosorbide     Lasix [furosemide]      myalgias    Moxifloxacin Other (See Comments)     Hives   muscle soreness    Timolol     Adhesive Rash     Clonidine patch - 2 mfg - contact dermatitis    Allegra [fexofenadine] Other (See Comments)     Double vision/spots     Codeine Diarrhea and Other (See Comments)     Loss of balance     Erythromycin Other (See Comments)     Complete weakness/could not walk    Hydrocortisone (bulk) Other (See Comments)     Only suppository/ caused muscle weakness    Macrolide antibiotics Other (See Comments)     Complete weakness/could not walk    Patanol [olopatadine] Other (See Comments)     Muscle weakness    Prednisone Anxiety    Statins-hmg-coa reductase inhibitors Other (See Comments)     Muscle weakness    Xalatan [latanoprost] Other (See Comments)     Muscle weakness       Current Facility-Administered Medications on File Prior to Encounter   Medication    sodium chloride 0.9% flush 3 mL     Current Outpatient Medications on File Prior to Encounter   Medication Sig    acetaminophen (TYLENOL) 500 MG tablet Take 500 mg by mouth daily as needed. Taking 1 to 3    ALPRAZolam (XANAX) 0.5 MG  tablet TAKE 1/2 - 1 TABLET BY MOUTH NIGHTLY FOR SLEEP OR ANXIETY    apixaban (ELIQUIS) 2.5 mg Tab Take 1 tablet (2.5 mg total) by mouth 2 (two) times daily.    cholecalciferol, vitamin D3, (VITAMIN D3) 2,000 unit Cap Take 1 capsule by mouth once daily.    cloNIDine (CATAPRES) 0.1 MG tablet Take 2 tablets (0.2 mg total) by mouth 3 (three) times daily as needed (for BP more than 170/100).    coenzyme Q10 200 mg capsule Take 400 mg by mouth once daily.     doxazosin (CARDURA) 4 MG tablet Take 1 tablet (4 mg total) by mouth every evening.    fish oil-omega-3 fatty acids 300-1,000 mg capsule Take 2 g by mouth 2 (two) times daily.     isosorbide mononitrate (IMDUR) 30 MG 24 hr tablet Take 1 tablet (30 mg total) by mouth once daily.    LOTEMAX 0.5 % DrpG     metoprolol tartrate (LOPRESSOR) 25 MG tablet Take 1 tablet (25 mg total) by mouth 2 (two) times daily.    POLYETHYLENE GLYCOL 3350 (MIRALAX ORAL) Take by mouth as needed. Taking BID    RHOPRESSA 0.02 % Drop once daily.     spironolactone (ALDACTONE) 25 MG tablet Take 1 tablet (25 mg total) by mouth once daily.    TRAVATAN Z 0.004 % ophthalmic solution PLACE 1 DROP INTO THE LEFT EYE EVERY EVENING.    losartan (COZAAR) 50 MG tablet Take 2 tablets (100 mg total) by mouth once daily.     Family History     Problem Relation (Age of Onset)    Cancer Father (88)    Heart disease Mother    Hypertension Mother        Tobacco Use    Smoking status: Never Smoker    Smokeless tobacco: Never Used    Tobacco comment: history of passive smoking from her ex-   Substance and Sexual Activity    Alcohol use: Not Currently     Alcohol/week: 2.0 standard drinks     Types: 2 Standard drinks or equivalent per week     Frequency: Monthly or less     Drinks per session: 1 or 2     Binge frequency: Never     Comment: occasional     Drug use: No    Sexual activity: Not Currently     Partners: Male     Birth control/protection: Post-menopausal     Comment: discussed  protection for STD's     Review of Systems   Constitution: Positive for chills, decreased appetite and malaise/fatigue.   HENT: Negative.    Eyes: Negative.    Cardiovascular: Positive for dyspnea on exertion, irregular heartbeat, orthopnea and palpitations.   Respiratory: Positive for shortness of breath.    Endocrine: Negative.    Hematologic/Lymphatic: Negative.    Skin: Negative.    Musculoskeletal: Negative.    Gastrointestinal: Negative.    Genitourinary: Negative.    Neurological: Negative.    Psychiatric/Behavioral: Negative.    Allergic/Immunologic: Negative.    All other systems reviewed and are negative.    Objective:     Vital Signs (Most Recent):  Temp: 98.4 °F (36.9 °C) (02/12/20 1445)  Pulse: 106 (02/12/20 1645)  Resp: (!) 21 (02/12/20 1645)  BP: 102/65 (02/12/20 1645)  SpO2: 95 % (02/12/20 1645) Vital Signs (24h Range):  Temp:  [98.4 °F (36.9 °C)] 98.4 °F (36.9 °C)  Pulse:  [106-149] 106  Resp:  [18-22] 21  SpO2:  [94 %-100 %] 95 %  BP: (102-205)/(64-91) 102/65     Weight: 74.8 kg (165 lb)  Body mass index is 28.32 kg/m².    SpO2: 95 %  O2 Device (Oxygen Therapy): room air    No intake or output data in the 24 hours ending 02/12/20 1708    Lines/Drains/Airways     Peripheral Intravenous Line                 Peripheral IV - Single Lumen 02/12/20 1530 20 G Left Hand less than 1 day                Physical Exam   Constitutional: She is oriented to person, place, and time. She appears well-developed and well-nourished. No distress.   HENT:   Head: Normocephalic and atraumatic.   Nose: Nose normal.   Mouth/Throat: Oropharynx is clear and moist.   Eyes: Conjunctivae and EOM are normal. No scleral icterus.   Neck: Normal range of motion. Neck supple. No JVD present. No thyromegaly present.   Cardiovascular: S1 normal and S2 normal. An irregularly irregular rhythm present. Tachycardia present. Exam reveals no gallop, no S3, no S4 and no friction rub.   Murmur heard.  Pulmonary/Chest: Effort normal and breath  sounds normal. No stridor. No respiratory distress. She has no wheezes. She has no rales. She exhibits no tenderness.   Abdominal: Soft. Bowel sounds are normal. She exhibits no distension and no mass. There is no tenderness. There is no rebound.   Genitourinary:   Genitourinary Comments: Deferred   Musculoskeletal: Normal range of motion. She exhibits no edema, tenderness or deformity.   Lymphadenopathy:     She has no cervical adenopathy.   Neurological: She is alert and oriented to person, place, and time. She exhibits normal muscle tone. Coordination normal.   Skin: Skin is warm and dry. No rash noted. She is not diaphoretic. No erythema. No pallor.   Psychiatric: She has a normal mood and affect. Her behavior is normal. Judgment and thought content normal.   Nursing note and vitals reviewed.      Significant Labs:   All pertinent lab results from the last 24 hours have been reviewed. and   Recent Lab Results       02/12/20  1530        Albumin 3.8     Alkaline Phosphatase 76     ALT 19     Anion Gap 12     aPTT 27.8  Comment:  aPTT therapeutic range = 39-69 seconds     AST 33     Baso # 0.05     Basophil% 0.7     BILIRUBIN TOTAL 0.5  Comment:  For infants and newborns, interpretation of results should be based  on gestational age, weight and in agreement with clinical  observations.  Premature Infant recommended reference ranges:  Up to 24 hours.............<8.0 mg/dL  Up to 48 hours............<12.0 mg/dL  3-5 days..................<15.0 mg/dL  6-29 days.................<15.0 mg/dL         Comment:  Values of less than 100 pg/ml are consistent with non-CHF populations.     BUN, Bld 25     Calcium 9.7     Chloride 106     CO2 21     Creatinine 1.3     Differential Method Automated     eGFR if  43     eGFR if non  37  Comment:  Calculation used to obtain the estimated glomerular filtration  rate (eGFR) is the CKD-EPI equation.        Eos # 0.1     Eosinophil% 1.9      Glucose 133     Gran # (ANC) 4.9     Gran% 65.5     Hematocrit 42.1     Hemoglobin 13.5     Immature Grans (Abs) 0.01  Comment:  Mild elevation in immature granulocytes is non specific and   can be seen in a variety of conditions including stress response,   acute inflammation, trauma and pregnancy. Correlation with other   laboratory and clinical findings is essential.       Immature Granulocytes 0.1     INR 1.0  Comment:  Coumadin Therapy:  2.0 - 3.0 for INR for all indicators except mechanical heart valves  and antiphospholipid syndromes which should use 2.5 - 3.5.       Lymph # 1.6     Lymph% 21.4     Magnesium 2.1     MCH 31.3     MCHC 32.1     MCV 98     Mono # 0.8     Mono% 10.4     MPV 10.0     nRBC 0     Platelets 252     Potassium 5.6     PROTEIN TOTAL 8.0     Protime 10.7     RBC 4.32     RDW 13.2     Sodium 139     Troponin I 0.075  Comment:  The reference interval for Troponin I represents the 99th percentile   cutoff   for our facility and is consistent with 3rd generation assay   performance.       WBC 7.42           Significant Imaging: Echocardiogram:   2D echo with color flow doppler:   Results for orders placed or performed during the hospital encounter of 10/09/19   2D echo with color flow doppler   Result Value Ref Range    QEF 55 55 - 65    Mitral Valve Regurgitation MILD TO MODERATE     Diastolic Dysfunction No     Est. PA Systolic Pressure 40.04 (A)     Pericardial Effusion TRIVIAL     Tricuspid Valve Regurgitation MILD     Narrative    Date of Procedure: 10/09/2019        TEST DESCRIPTION   Technical Quality: This is a technically challenging study. There is poor endocardial definition.     General: The patient was in an irregularly irregular rhythm throughout the study.     Aorta: The aortic root is normal in size, measuring 2.9 cm at sinotubular junction and 3.4 cm at Sinuses of Valsalva. The proximal ascending aorta is normal in size, measuring 2.9 cm across.     Left Atrium: The left  atrial volume index is normal, measuring 22.02 cc/m2.     Left Ventricle: The left ventricle is normal in size, with an end-diastolic diameter of 3.8 cm, and an end-systolic diameter of 2.8 cm. Wall thickness is increased, with the septum measuring 1.7 cm and the posterior wall measuring 1.6 cm across. Relative   wall thickness was increased at 0.84, and the LV mass index was increased at 164.8 g/m2 consistent with concentric left ventricular hypertrophy. There are no regional wall motion abnormalities. Left ventricular systolic function appears normal. Visually   estimated ejection fraction is 55-60%. The LV Doppler derived stroke volume equals 60.0 ccs.     Diastolic indices: E wave velocity 1.0 m/s, E/A ratio 4.8,  msec., E/e' ratio(avg) 8. Diastolic function is normal.     Right Atrium: The right atrium is normal in size, measuring 3.9 cm in length in the apical view.     Right Ventricle: The right ventricle is normal in size. Global right ventricular systolic function appears normal. Tricuspid annular plane systolic excursion (TAPSE) is 1.5 cm. The estimated PA systolic pressure is 40 mmHg.     Aortic Valve:  The aortic valve is normal in structure. The mean gradient obtained across the aortic valve is 7 mmHg.     Mitral Valve:  The mitral valve is mildly sclerotic. The pressure half time is 62 msec. The calculated mitral valve area is 3.55 cm2. There is mild to moderate mitral regurgitation. There is mitral annular calcification.     Tricuspid Valve:  The tricuspid valve is normal in structure. There is mild tricuspid regurgitation.     Pulmonary Valve:  The pulmonic valve is not well seen.     Pericardium: There is evidence of a trivial pericardial effusion.     IVC: IVC is enlarged but collapses > 50% with a sniff, suggesting intermediate right atrial pressure of 8 mmHg.     Intracavitary: There is no evidence of intracavity mass, thrombi, or vegetation.         CONCLUSIONS     1 - Normal left  ventricular systolic function (EF 55-60%).     2 - Normal left ventricular diastolic function.     3 - Normal right ventricular systolic function .     4 - Mild to moderate mitral regurgitation.     5 - Concentric hypertrophy.             This document has been electronically    SIGNED BY: Pete Durán MD On: 10/09/2019 20:00    and X-Ray: CXR: X-Ray Chest 1 View (CXR): No results found for this visit on 02/12/20.

## 2020-02-12 NOTE — TELEPHONE ENCOUNTER
Noted. I had spoken with the patient after she talked to triage and was waiting to go to the ED earlier today.

## 2020-02-13 ENCOUNTER — TELEPHONE (OUTPATIENT)
Dept: CARDIOLOGY | Facility: CLINIC | Age: 85
End: 2020-02-13

## 2020-02-13 ENCOUNTER — TELEPHONE (OUTPATIENT)
Dept: INTERNAL MEDICINE | Facility: CLINIC | Age: 85
End: 2020-02-13

## 2020-02-13 ENCOUNTER — OUTPATIENT CASE MANAGEMENT (OUTPATIENT)
Dept: ADMINISTRATIVE | Facility: OTHER | Age: 85
End: 2020-02-13

## 2020-02-13 VITALS
TEMPERATURE: 98 F | BODY MASS INDEX: 28.6 KG/M2 | SYSTOLIC BLOOD PRESSURE: 145 MMHG | DIASTOLIC BLOOD PRESSURE: 67 MMHG | OXYGEN SATURATION: 95 % | HEART RATE: 66 BPM | WEIGHT: 167.56 LBS | HEIGHT: 64 IN | RESPIRATION RATE: 18 BRPM

## 2020-02-13 PROBLEM — E87.5 HYPERKALEMIA: Status: RESOLVED | Noted: 2020-02-12 | Resolved: 2020-02-13

## 2020-02-13 PROBLEM — I48.92 ATRIAL FLUTTER WITH RAPID VENTRICULAR RESPONSE: Status: RESOLVED | Noted: 2020-02-12 | Resolved: 2020-02-13

## 2020-02-13 LAB
ANION GAP SERPL CALC-SCNC: 11 MMOL/L (ref 8–16)
BASOPHILS # BLD AUTO: 0.05 K/UL (ref 0–0.2)
BASOPHILS NFR BLD: 0.7 % (ref 0–1.9)
BUN SERPL-MCNC: 26 MG/DL (ref 8–23)
CALCIUM SERPL-MCNC: 9.9 MG/DL (ref 8.7–10.5)
CHLORIDE SERPL-SCNC: 105 MMOL/L (ref 95–110)
CO2 SERPL-SCNC: 25 MMOL/L (ref 23–29)
CREAT SERPL-MCNC: 1.4 MG/DL (ref 0.5–1.4)
DIFFERENTIAL METHOD: ABNORMAL
EOSINOPHIL # BLD AUTO: 0.2 K/UL (ref 0–0.5)
EOSINOPHIL NFR BLD: 3.3 % (ref 0–8)
ERYTHROCYTE [DISTWIDTH] IN BLOOD BY AUTOMATED COUNT: 13.3 % (ref 11.5–14.5)
EST. GFR  (AFRICAN AMERICAN): 39 ML/MIN/1.73 M^2
EST. GFR  (NON AFRICAN AMERICAN): 34 ML/MIN/1.73 M^2
GLUCOSE SERPL-MCNC: 117 MG/DL (ref 70–110)
HCT VFR BLD AUTO: 40.3 % (ref 37–48.5)
HGB BLD-MCNC: 12.9 G/DL (ref 12–16)
IMM GRANULOCYTES # BLD AUTO: 0.02 K/UL (ref 0–0.04)
IMM GRANULOCYTES NFR BLD AUTO: 0.3 % (ref 0–0.5)
LYMPHOCYTES # BLD AUTO: 2.4 K/UL (ref 1–4.8)
LYMPHOCYTES NFR BLD: 32.8 % (ref 18–48)
MCH RBC QN AUTO: 31.5 PG (ref 27–31)
MCHC RBC AUTO-ENTMCNC: 32 G/DL (ref 32–36)
MCV RBC AUTO: 99 FL (ref 82–98)
MONOCYTES # BLD AUTO: 0.7 K/UL (ref 0.3–1)
MONOCYTES NFR BLD: 9.5 % (ref 4–15)
NEUTROPHILS # BLD AUTO: 3.9 K/UL (ref 1.8–7.7)
NEUTROPHILS NFR BLD: 53.4 % (ref 38–73)
NRBC BLD-RTO: 0 /100 WBC
PLATELET # BLD AUTO: 242 K/UL (ref 150–350)
PMV BLD AUTO: 10.1 FL (ref 9.2–12.9)
POTASSIUM SERPL-SCNC: 4 MMOL/L (ref 3.5–5.1)
RBC # BLD AUTO: 4.09 M/UL (ref 4–5.4)
SODIUM SERPL-SCNC: 141 MMOL/L (ref 136–145)
TROPONIN I SERPL DL<=0.01 NG/ML-MCNC: 0.08 NG/ML (ref 0–0.03)
WBC # BLD AUTO: 7.23 K/UL (ref 3.9–12.7)

## 2020-02-13 PROCEDURE — 25000003 PHARM REV CODE 250: Mod: HCNC | Performed by: PHYSICIAN ASSISTANT

## 2020-02-13 PROCEDURE — 93005 ELECTROCARDIOGRAM TRACING: CPT | Mod: HCNC

## 2020-02-13 PROCEDURE — 93010 ELECTROCARDIOGRAM REPORT: CPT | Mod: HCNC,,, | Performed by: INTERNAL MEDICINE

## 2020-02-13 PROCEDURE — 36415 COLL VENOUS BLD VENIPUNCTURE: CPT | Mod: HCNC

## 2020-02-13 PROCEDURE — 99233 SBSQ HOSP IP/OBS HIGH 50: CPT | Mod: HCNC,,, | Performed by: INTERNAL MEDICINE

## 2020-02-13 PROCEDURE — 84484 ASSAY OF TROPONIN QUANT: CPT | Mod: HCNC

## 2020-02-13 PROCEDURE — 85025 COMPLETE CBC W/AUTO DIFF WBC: CPT | Mod: HCNC

## 2020-02-13 PROCEDURE — 99233 PR SUBSEQUENT HOSPITAL CARE,LEVL III: ICD-10-PCS | Mod: HCNC,,, | Performed by: INTERNAL MEDICINE

## 2020-02-13 PROCEDURE — 80048 BASIC METABOLIC PNL TOTAL CA: CPT | Mod: HCNC

## 2020-02-13 PROCEDURE — 93010 EKG 12-LEAD: ICD-10-PCS | Mod: HCNC,,, | Performed by: INTERNAL MEDICINE

## 2020-02-13 PROCEDURE — 25000003 PHARM REV CODE 250: Mod: HCNC | Performed by: FAMILY MEDICINE

## 2020-02-13 RX ORDER — LOSARTAN POTASSIUM 50 MG/1
100 TABLET ORAL DAILY
Status: DISCONTINUED | OUTPATIENT
Start: 2020-02-13 | End: 2020-02-13

## 2020-02-13 RX ORDER — AMIODARONE HYDROCHLORIDE 200 MG/1
200 TABLET ORAL 2 TIMES DAILY
Qty: 60 TABLET | Refills: 11 | Status: SHIPPED | OUTPATIENT
Start: 2020-02-13 | End: 2020-03-23 | Stop reason: SDUPTHER

## 2020-02-13 RX ORDER — AMIODARONE HYDROCHLORIDE 200 MG/1
200 TABLET ORAL 2 TIMES DAILY
Status: DISCONTINUED | OUTPATIENT
Start: 2020-02-13 | End: 2020-02-13 | Stop reason: HOSPADM

## 2020-02-13 RX ADMIN — METOPROLOL TARTRATE 25 MG: 25 TABLET, FILM COATED ORAL at 09:02

## 2020-02-13 RX ADMIN — ISOSORBIDE MONONITRATE 30 MG: 30 TABLET, EXTENDED RELEASE ORAL at 09:02

## 2020-02-13 RX ADMIN — APIXABAN 2.5 MG: 2.5 TABLET, FILM COATED ORAL at 09:02

## 2020-02-13 RX ADMIN — AMIODARONE HYDROCHLORIDE 200 MG: 200 TABLET ORAL at 12:02

## 2020-02-13 NOTE — DISCHARGE SUMMARY
[facility] Hospital Medicine  Discharge Summary      Patient Name: Shirley Metz  MRN: [id number]  Admission Date: 2/12/2020  Hospital Length of Stay: 1 days  Discharge Date and Time: 2/13/2020  2:26 PM  Attending Physician: No att. providers found   Discharging Provider: Tong Esposito MD  Primary Care Provider: Yandy Terrell MD      HPI:   88 y/o female with PMHx of AF with RVR who presented to the ED with c/o SOB that onset gradually 2 days ago. associated symptoms include palpitations, nausea, and dizziness. Symptoms are constant and moderate in severity. No mitigating or exacerbating factors reported. Patient denies any fever, cough, CP, emesis, weakness/numbness, and all other sxs at this time.  Patient recently started on doxazosin by PCP. Patient states she has taken 2 doses and thinks current sx are a reaction to doxazosin.   She will be kept on OBS for a fib with RVR under the care of Hospital Medicine.     Advance Care Planning   she is a full code and her SMD is her son, Kirt         * No surgery found *      Hospital Course:   Patient was admitted for afib RVR. Patient was started on amiodarone drip and converted to NSR overnight. She was started on PO amiodarone and discharged home with instructions to follow up with cards outpatient.      Consults:   Consults (From admission, onward)        Status Ordering Provider     Inpatient consult to Cardiology  Once     Provider:  Trevon Ramirez MD    Completed SEN SONI          No new Assessment & Plan notes have been filed under this hospital service since the last note was generated.  Service: Hospital Medicine    Final Active Diagnoses:    Diagnosis Date Noted POA    Chronic diastolic congestive heart failure [I50.32] 10/15/2019 Yes    HTN (hypertension) [I10] 06/10/2013 Yes     Chronic      Problems Resolved During this Admission:    Diagnosis Date Noted Date Resolved POA    PRINCIPAL PROBLEM:  Atrial flutter with  rapid ventricular response [I48.92] 02/12/2020 02/13/2020 Yes    Hyperkalemia [E87.5] 02/12/2020 02/13/2020 Yes       Discharged Condition: good    Disposition: Home or Self Care    Follow Up:  Follow-up Information     Yandy Terrell MD In 1 week.    Specialty:  Family Medicine  Contact information:  [address] [place] [address]  [phone]             Trevon Ramirez Md, MD In 1 week.    Specialties:  Cardiology, Internal Medicine  Contact information:  [address] [facility]  [place] [address]  [phone]                 Patient Instructions:   No discharge procedures on file.    Significant Diagnostic Studies:   Results for orders placed or performed during the hospital encounter of 02/12/20   CBC auto differential   Result Value Ref Range    WBC 7.42 3.90 - 12.70 K/uL    RBC 4.32 4.00 - 5.40 M/uL    Hemoglobin 13.5 12.0 - 16.0 g/dL    Hematocrit 42.1 37.0 - 48.5 %    Mean Corpuscular Volume 98 82 - 98 fL    Mean Corpuscular Hemoglobin 31.3 (H) 27.0 - 31.0 pg    Mean Corpuscular Hemoglobin Conc 32.1 32.0 - 36.0 g/dL    RDW 13.2 11.5 - 14.5 %    Platelets 252 150 - 350 K/uL    MPV 10.0 9.2 - 12.9 fL    Immature Granulocytes 0.1 0.0 - 0.5 %    Gran # (ANC) 4.9 1.8 - 7.7 K/uL    Immature Grans (Abs) 0.01 0.00 - 0.04 K/uL    Lymph # 1.6 1.0 - 4.8 K/uL    Mono # 0.8 0.3 - 1.0 K/uL    Eos # 0.1 0.0 - 0.5 K/uL    Baso # 0.05 0.00 - 0.20 K/uL    nRBC 0 0 /100 WBC    Gran% 65.5 38.0 - 73.0 %    Lymph% 21.4 18.0 - 48.0 %    Mono% 10.4 4.0 - 15.0 %    Eosinophil% 1.9 0.0 - 8.0 %    Basophil% 0.7 0.0 - 1.9 %    Differential Method Automated    Comprehensive metabolic panel   Result Value Ref Range    Sodium 139 136 - 145 mmol/L    Potassium 5.6 (H) 3.5 - 5.1 mmol/L    Chloride 106 95 - 110 mmol/L    CO2 21 (L) 23 - 29 mmol/L    Glucose 133 (H) 70 - 110 mg/dL    BUN, Bld 25 (H) 8 - 23 mg/dL    Creatinine 1.3 0.5 - 1.4 mg/dL    Calcium 9.7 8.7 - 10.5 mg/dL    Total Protein 8.0 6.0 - 8.4 g/dL    Albumin 3.8 3.5 -  5.2 g/dL    Total Bilirubin 0.5 0.1 - 1.0 mg/dL    Alkaline Phosphatase 76 55 - 135 U/L    AST 33 10 - 40 U/L    ALT 19 10 - 44 U/L    Anion Gap 12 8 - 16 mmol/L    eGFR if African American 43 (A) >60 mL/min/1.73 m^2    eGFR if non African American 37 (A) >60 mL/min/1.73 m^2   Troponin I #1   Result Value Ref Range    Troponin I 0.075 (H) 0.000 - 0.026 ng/mL   B-Type natriuretic peptide (BNP)   Result Value Ref Range     (H) 0 - 99 pg/mL   Protime-INR   Result Value Ref Range    Prothrombin Time 10.7 9.0 - 12.5 sec    INR 1.0 0.8 - 1.2   APTT   Result Value Ref Range    aPTT 27.8 21.0 - 32.0 sec   Magnesium   Result Value Ref Range    Magnesium 2.1 1.6 - 2.6 mg/dL   Potassium   Result Value Ref Range    Potassium 3.6 3.5 - 5.1 mmol/L   CBC auto differential   Result Value Ref Range    WBC 7.23 3.90 - 12.70 K/uL    RBC 4.09 4.00 - 5.40 M/uL    Hemoglobin 12.9 12.0 - 16.0 g/dL    Hematocrit 40.3 37.0 - 48.5 %    Mean Corpuscular Volume 99 (H) 82 - 98 fL    Mean Corpuscular Hemoglobin 31.5 (H) 27.0 - 31.0 pg    Mean Corpuscular Hemoglobin Conc 32.0 32.0 - 36.0 g/dL    RDW 13.3 11.5 - 14.5 %    Platelets 242 150 - 350 K/uL    MPV 10.1 9.2 - 12.9 fL    Immature Granulocytes 0.3 0.0 - 0.5 %    Gran # (ANC) 3.9 1.8 - 7.7 K/uL    Immature Grans (Abs) 0.02 0.00 - 0.04 K/uL    Lymph # 2.4 1.0 - 4.8 K/uL    Mono # 0.7 0.3 - 1.0 K/uL    Eos # 0.2 0.0 - 0.5 K/uL    Baso # 0.05 0.00 - 0.20 K/uL    nRBC 0 0 /100 WBC    Gran% 53.4 38.0 - 73.0 %    Lymph% 32.8 18.0 - 48.0 %    Mono% 9.5 4.0 - 15.0 %    Eosinophil% 3.3 0.0 - 8.0 %    Basophil% 0.7 0.0 - 1.9 %    Differential Method Automated    Basic metabolic panel   Result Value Ref Range    Sodium 141 136 - 145 mmol/L    Potassium 4.0 3.5 - 5.1 mmol/L    Chloride 105 95 - 110 mmol/L    CO2 25 23 - 29 mmol/L    Glucose 117 (H) 70 - 110 mg/dL    BUN, Bld 26 (H) 8 - 23 mg/dL    Creatinine 1.4 0.5 - 1.4 mg/dL    Calcium 9.9 8.7 - 10.5 mg/dL    Anion Gap 11 8 - 16 mmol/L    eGFR  if African American 39 (A) >60 mL/min/1.73 m^2    eGFR if non African American 34 (A) >60 mL/min/1.73 m^2   Troponin I   Result Value Ref Range    Troponin I 0.081 (H) 0.000 - 0.026 ng/mL     *Note: Due to a large number of results and/or encounters for the requested time period, some results have not been displayed. A complete set of results can be found in Results Review.        Pending Diagnostic Studies:     None         Medications:  Reconciled Home Medications:      Medication List      START taking these medications    amiodarone 200 MG Tab  Commonly known as:  PACERONE  Take 1 tablet (200 mg total) by mouth 2 (two) times daily.        CONTINUE taking these medications    acetaminophen 500 MG tablet  Commonly known as:  TYLENOL  Take 500 mg by mouth daily as needed. Taking 1 to 3     ALPRAZolam 0.5 MG tablet  Commonly known as:  XANAX  TAKE 1/2 - 1 TABLET BY MOUTH NIGHTLY FOR SLEEP OR ANXIETY     apixaban 2.5 mg Tab  Commonly known as:  ELIQUIS  Take 1 tablet (2.5 mg total) by mouth 2 (two) times daily.     cloNIDine 0.1 MG tablet  Commonly known as:  CATAPRES  Take 2 tablets (0.2 mg total) by mouth 3 (three) times daily as needed (for BP more than 170/100).     coenzyme Q10 200 mg capsule  Take 400 mg by mouth once daily.     doxazosin 4 MG tablet  Commonly known as:  CARDURA  Take 1 tablet (4 mg total) by mouth every evening.     fish oil-omega-3 fatty acids 300-1,000 mg capsule  Take 2 g by mouth 2 (two) times daily.     isosorbide mononitrate 30 MG 24 hr tablet  Commonly known as:  IMDUR  Take 1 tablet (30 mg total) by mouth once daily.     losartan 50 MG tablet  Commonly known as:  COZAAR  Take 2 tablets (100 mg total) by mouth once daily.     Lotemax 0.5 % Drpg  Generic drug:  loteprednol etabonate     metoprolol tartrate 25 MG tablet  Commonly known as:  LOPRESSOR  Take 1 tablet (25 mg total) by mouth 2 (two) times daily.     MIRALAX ORAL  Take by mouth as needed. Taking BID     Rhopressa 0.02 %  Drop  Generic drug:  netarsudil  once daily.     Travatan Z 0.004 % ophthalmic solution  Generic drug:  travoprost  PLACE 1 DROP INTO THE LEFT EYE EVERY EVENING.     Vitamin D3 50 mcg (2,000 unit) Cap  Generic drug:  cholecalciferol (vitamin D3)  Take 1 capsule by mouth once daily.        STOP taking these medications    spironolactone 25 MG tablet  Commonly known as:  ALDACTONE            Indwelling Lines/Drains at time of discharge:   Lines/Drains/Airways     None                 Time spent on the discharge of patient: 40 minutes  Patient was seen and examined on the date of discharge and determined to be suitable for discharge.         Tong Esposito MD  Department of Hospital Medicine  [facility]

## 2020-02-13 NOTE — NURSING
Patient received from ED. Placed on tele box [id number] and verified. Patient oriented to unit and room. VSS. Will continue to monitor.

## 2020-02-13 NOTE — PROGRESS NOTES
[facility] -   Cardiology  Progress Note    Patient Name: Shirley Metz  MRN: [id number]  Admission Date: 2/12/2020  Hospital Length of Stay: 1 days  Code Status: Full Code   Attending Physician: Tong Esposito MD   Primary Care Physician: Yandy Terrell MD  Expected Discharge Date:   Principal Problem:Atrial flutter with rapid ventricular response    Subjective:   HPI:  Shirley Metz is a 87 y.o. female with AFib on Eliquis s/p STACEY/DCCV, HFPEF, h/o SELVIN thrombus, resistant HTN, HLD, metabolic syndrome, and CKD here in Emergency Department for evaluation of SOB which onset 2 days ago. Info obtained from chart and pt.  Symptoms are constant and moderate in severity. No mitigating or exacerbating factors reported. Associated sxs include dizziness, nausea, and palpitations. Patient denies any fever, cough, CP, emesis, weakness/numbness, and all other sxs at this time. No prior Tx reported. No further complaints or concerns at this time. Patient recently started on doxazosin by PCP. Patient states she has taken 2 doses and thinks current sx are a reaction to doxazosin.   Cardiology consulted for rapid Afib. Discussed will start Amio drip and may repeat DCCV if she does not convert. Has been on [facility] Course:   2/13/2020-Patient seen and examined today, resting in bed. Feels well. No complaints. Converted to SR. Labs reviewed, K normal.         Review of Systems   Constitution: Positive for malaise/fatigue.   HENT: Negative.    Eyes: Negative.    Cardiovascular: Negative.    Respiratory: Negative.    Endocrine: Negative.    Hematologic/Lymphatic: Bruises/bleeds easily.   Skin: Negative.    Musculoskeletal: Positive for arthritis, back pain and joint pain.   Gastrointestinal: Negative.    Genitourinary: Negative.    Neurological: Negative.    Psychiatric/Behavioral: Negative.    Allergic/Immunologic: Negative.      Objective:     Vital Signs (Most Recent):  Temp: 97.8 °F (36.6 °C)  (02/13/20 0743)  Pulse: 62 (02/13/20 1114)  Resp: 20 (02/13/20 0743)  BP: (!) 173/74 (02/13/20 0743)  SpO2: 97 % (02/13/20 0743) Vital Signs (24h Range):  Temp:  [97.1 °F (36.2 °C)-98.4 °F (36.9 °C)] 97.8 °F (36.6 °C)  Pulse:  [] 62  Resp:  [12-22] 20  SpO2:  [94 %-100 %] 97 %  BP: (102-205)/(52-91) 173/74     Weight: 76 kg (167 lb 8.8 oz)  Body mass index is 28.76 kg/m².     SpO2: 97 %  O2 Device (Oxygen Therapy): room air      Intake/Output Summary (Last 24 hours) at 2/13/2020 1116  Last data filed at 2/13/2020 0743  Gross per 24 hour   Intake 1182.05 ml   Output 250 ml   Net 932.05 ml       Lines/Drains/Airways     Peripheral Intravenous Line                 Peripheral IV - Single Lumen 02/12/20 1530 20 G Left Hand less than 1 day         Peripheral IV - Single Lumen 02/12/20 2030 22 G Anterior;Left;Proximal Forearm less than 1 day                Physical Exam   Constitutional: She is oriented to person, place, and time. She appears well-developed and well-nourished. No distress.   HENT:   Head: Normocephalic and atraumatic.   Eyes: Pupils are equal, round, and reactive to light. Right eye exhibits no discharge. Left eye exhibits no discharge.   Neck: Neck supple. No JVD present.   Cardiovascular: Normal rate, regular rhythm, S1 normal, S2 normal and normal heart sounds.   No murmur heard.  Pulmonary/Chest: Effort normal and breath sounds normal. No respiratory distress. She has no wheezes. She has no rales.   Abdominal: Soft. She exhibits no distension.   Musculoskeletal: She exhibits no edema.   Neurological: She is alert and oriented to person, place, and time.   Skin: Skin is warm and dry. She is not diaphoretic. No erythema.   Psychiatric: She has a normal mood and affect. Her behavior is normal. Thought content normal.   Nursing note and vitals reviewed.      Significant Labs:   CMP   Recent Labs   Lab 02/12/20  1530 02/12/20  2116 02/13/20  0600     --  141   K 5.6* 3.6 4.0     --  105    CO2 21*  --  25   *  --  117*   BUN 25*  --  26*   CREATININE 1.3  --  1.4   CALCIUM 9.7  --  9.9   PROT 8.0  --   --    ALBUMIN 3.8  --   --    BILITOT 0.5  --   --    ALKPHOS 76  --   --    AST 33  --   --    ALT 19  --   --    ANIONGAP 12  --  11   ESTGFRAFRICA 43*  --  39*   EGFRNONAA 37*  --  34*   , CBC   Recent Labs   Lab 02/12/20  1530 02/13/20  0600   WBC 7.42 7.23   HGB 13.5 12.9   HCT 42.1 40.3    242   , Troponin   Recent Labs   Lab 02/12/20  1530   TROPONINI 0.075*    and All pertinent lab results from the last 24 hours have been reviewed.    Significant Imaging: Echocardiogram:   2D echo with color flow doppler:   Results for orders placed or performed during the hospital encounter of 10/09/19   2D echo with color flow doppler   Result Value Ref Range    QEF 55 55 - 65    Mitral Valve Regurgitation MILD TO MODERATE     Diastolic Dysfunction No     Est. PA Systolic Pressure 40.04 (A)     Pericardial Effusion TRIVIAL     Tricuspid Valve Regurgitation MILD     Narrative    Date of Procedure: 10/09/2019        TEST DESCRIPTION   Technical Quality: This is a technically challenging study. There is poor endocardial definition.     General: The patient was in an irregularly irregular rhythm throughout the study.     Aorta: The aortic root is normal in size, measuring 2.9 cm at sinotubular junction and 3.4 cm at Sinuses of Valsalva. The proximal ascending aorta is normal in size, measuring 2.9 cm across.     Left Atrium: The left atrial volume index is normal, measuring 22.02 cc/m2.     Left Ventricle: The left ventricle is normal in size, with an end-diastolic diameter of 3.8 cm, and an end-systolic diameter of 2.8 cm. Wall thickness is increased, with the septum measuring 1.7 cm and the posterior wall measuring 1.6 cm across. Relative   wall thickness was increased at 0.84, and the LV mass index was increased at 164.8 g/m2 consistent with concentric left ventricular hypertrophy. There are no  regional wall motion abnormalities. Left ventricular systolic function appears normal. Visually   estimated ejection fraction is 55-60%. The LV Doppler derived stroke volume equals 60.0 ccs.     Diastolic indices: E wave velocity 1.0 m/s, E/A ratio 4.8,  msec., E/e' ratio(avg) 8. Diastolic function is normal.     Right Atrium: The right atrium is normal in size, measuring 3.9 cm in length in the apical view.     Right Ventricle: The right ventricle is normal in size. Global right ventricular systolic function appears normal. Tricuspid annular plane systolic excursion (TAPSE) is 1.5 cm. The estimated PA systolic pressure is 40 mmHg.     Aortic Valve:  The aortic valve is normal in structure. The mean gradient obtained across the aortic valve is 7 mmHg.     Mitral Valve:  The mitral valve is mildly sclerotic. The pressure half time is 62 msec. The calculated mitral valve area is 3.55 cm2. There is mild to moderate mitral regurgitation. There is mitral annular calcification.     Tricuspid Valve:  The tricuspid valve is normal in structure. There is mild tricuspid regurgitation.     Pulmonary Valve:  The pulmonic valve is not well seen.     Pericardium: There is evidence of a trivial pericardial effusion.     IVC: IVC is enlarged but collapses > 50% with a sniff, suggesting intermediate right atrial pressure of 8 mmHg.     Intracavitary: There is no evidence of intracavity mass, thrombi, or vegetation.         CONCLUSIONS     1 - Normal left ventricular systolic function (EF 55-60%).     2 - Normal left ventricular diastolic function.     3 - Normal right ventricular systolic function .     4 - Mild to moderate mitral regurgitation.     5 - Concentric hypertrophy.             This document has been electronically    SIGNED BY: Pete Durán MD On: 10/09/2019 20:00   , EKG: Reviewed and X-Ray: CXR: X-Ray Chest 1 View (CXR): No results found for this visit on 02/12/20. and X-Ray Chest PA and Lateral (CXR): No results  found for this visit on 02/12/20.    Assessment and Plan:   Patient who presents with aflutter with RVR. Converted to SR overnight. PO amiodarone started. Continue BB and Eliquis. Elevated troponin secondary to demand ischemia from tachycardia. Repeat today.    * Atrial flutter with rapid ventricular response  Afib/Flutter  Will load with IV amio  Cont Eliquis and BB  DCCV on Friday if she does not convert    2/13/2020  -Converted to SR overnight  -D/C amiodarone gtt; start amiodarone 200 mg BID  -Continue BB  -Continue Eliquis for CVA prophylaxis     Hyperkalemia  -Resolved      A-fib  See above    Chronic diastolic congestive heart failure  -I/O  Low salt diet  Cont diuretics    Anemia  Cont to monitor    HTN (hypertension)  -Continue BB and Imdur  -Continue ARB  -Would be cautious with Aldactone given elevated K upon admission  -Can f/u in clinic for further adjustments        VTE Risk Mitigation (From admission, onward)         Ordered     apixaban tablet 2.5 mg  2 times daily      02/12/20 0207                Jo-Ann Jacobo PA-C  Cardiology  [facility]

## 2020-02-13 NOTE — TELEPHONE ENCOUNTER
I spoke with Ms Watson and she is requesting a hospital follow up appt with Dr Ramirez on MWF at [place] before 2 pm next week. I will send a message to Dr Ramirez's staff.

## 2020-02-13 NOTE — TELEPHONE ENCOUNTER
----- Message from Jesus Patel RN sent at 2/13/2020  1:20 PM CST -----  Please called patient for hospital follow up appointment within 1 week of discharge, no availability noted per request. Patient is planned for discharge 2/13/20, thank you

## 2020-02-13 NOTE — TELEPHONE ENCOUNTER
----- Message from Jesus Patel RN sent at 2/13/2020  1:12 PM CST -----  Please call patient to set hospital follow up appointment, no availability listed within 1 week of discharge per AVS. Patient being discharged 2/13/20. Thank you

## 2020-02-13 NOTE — SUBJECTIVE & OBJECTIVE
Review of Systems   Constitution: Positive for malaise/fatigue.   HENT: Negative.    Eyes: Negative.    Cardiovascular: Negative.    Respiratory: Negative.    Endocrine: Negative.    Hematologic/Lymphatic: Bruises/bleeds easily.   Skin: Negative.    Musculoskeletal: Positive for arthritis, back pain and joint pain.   Gastrointestinal: Negative.    Genitourinary: Negative.    Neurological: Negative.    Psychiatric/Behavioral: Negative.    Allergic/Immunologic: Negative.      Objective:     Vital Signs (Most Recent):  Temp: 97.8 °F (36.6 °C) (02/13/20 0743)  Pulse: 62 (02/13/20 1114)  Resp: 20 (02/13/20 0743)  BP: (!) 173/74 (02/13/20 0743)  SpO2: 97 % (02/13/20 0743) Vital Signs (24h Range):  Temp:  [97.1 °F (36.2 °C)-98.4 °F (36.9 °C)] 97.8 °F (36.6 °C)  Pulse:  [] 62  Resp:  [12-22] 20  SpO2:  [94 %-100 %] 97 %  BP: (102-205)/(52-91) 173/74     Weight: 76 kg (167 lb 8.8 oz)  Body mass index is 28.76 kg/m².     SpO2: 97 %  O2 Device (Oxygen Therapy): room air      Intake/Output Summary (Last 24 hours) at 2/13/2020 1116  Last data filed at 2/13/2020 0743  Gross per 24 hour   Intake 1182.05 ml   Output 250 ml   Net 932.05 ml       Lines/Drains/Airways     Peripheral Intravenous Line                 Peripheral IV - Single Lumen 02/12/20 1530 20 G Left Hand less than 1 day         Peripheral IV - Single Lumen 02/12/20 2030 22 G Anterior;Left;Proximal Forearm less than 1 day                Physical Exam   Constitutional: She is oriented to person, place, and time. She appears well-developed and well-nourished. No distress.   HENT:   Head: Normocephalic and atraumatic.   Eyes: Pupils are equal, round, and reactive to light. Right eye exhibits no discharge. Left eye exhibits no discharge.   Neck: Neck supple. No JVD present.   Cardiovascular: Normal rate, regular rhythm, S1 normal, S2 normal and normal heart sounds.   No murmur heard.  Pulmonary/Chest: Effort normal and breath sounds normal. No respiratory  distress. She has no wheezes. She has no rales.   Abdominal: Soft. She exhibits no distension.   Musculoskeletal: She exhibits no edema.   Neurological: She is alert and oriented to person, place, and time.   Skin: Skin is warm and dry. She is not diaphoretic. No erythema.   Psychiatric: She has a normal mood and affect. Her behavior is normal. Thought content normal.   Nursing note and vitals reviewed.      Significant Labs:   CMP   Recent Labs   Lab 02/12/20  1530 02/12/20  2116 02/13/20  0600     --  141   K 5.6* 3.6 4.0     --  105   CO2 21*  --  25   *  --  117*   BUN 25*  --  26*   CREATININE 1.3  --  1.4   CALCIUM 9.7  --  9.9   PROT 8.0  --   --    ALBUMIN 3.8  --   --    BILITOT 0.5  --   --    ALKPHOS 76  --   --    AST 33  --   --    ALT 19  --   --    ANIONGAP 12  --  11   ESTGFRAFRICA 43*  --  39*   EGFRNONAA 37*  --  34*   , CBC   Recent Labs   Lab 02/12/20  1530 02/13/20  0600   WBC 7.42 7.23   HGB 13.5 12.9   HCT 42.1 40.3    242   , Troponin   Recent Labs   Lab 02/12/20  1530   TROPONINI 0.075*    and All pertinent lab results from the last 24 hours have been reviewed.    Significant Imaging: Echocardiogram:   2D echo with color flow doppler:   Results for orders placed or performed during the hospital encounter of 10/09/19   2D echo with color flow doppler   Result Value Ref Range    QEF 55 55 - 65    Mitral Valve Regurgitation MILD TO MODERATE     Diastolic Dysfunction No     Est. PA Systolic Pressure 40.04 (A)     Pericardial Effusion TRIVIAL     Tricuspid Valve Regurgitation MILD     Narrative    Date of Procedure: 10/09/2019        TEST DESCRIPTION   Technical Quality: This is a technically challenging study. There is poor endocardial definition.     General: The patient was in an irregularly irregular rhythm throughout the study.     Aorta: The aortic root is normal in size, measuring 2.9 cm at sinotubular junction and 3.4 cm at Sinuses of Valsalva. The proximal  ascending aorta is normal in size, measuring 2.9 cm across.     Left Atrium: The left atrial volume index is normal, measuring 22.02 cc/m2.     Left Ventricle: The left ventricle is normal in size, with an end-diastolic diameter of 3.8 cm, and an end-systolic diameter of 2.8 cm. Wall thickness is increased, with the septum measuring 1.7 cm and the posterior wall measuring 1.6 cm across. Relative   wall thickness was increased at 0.84, and the LV mass index was increased at 164.8 g/m2 consistent with concentric left ventricular hypertrophy. There are no regional wall motion abnormalities. Left ventricular systolic function appears normal. Visually   estimated ejection fraction is 55-60%. The LV Doppler derived stroke volume equals 60.0 ccs.     Diastolic indices: E wave velocity 1.0 m/s, E/A ratio 4.8,  msec., E/e' ratio(avg) 8. Diastolic function is normal.     Right Atrium: The right atrium is normal in size, measuring 3.9 cm in length in the apical view.     Right Ventricle: The right ventricle is normal in size. Global right ventricular systolic function appears normal. Tricuspid annular plane systolic excursion (TAPSE) is 1.5 cm. The estimated PA systolic pressure is 40 mmHg.     Aortic Valve:  The aortic valve is normal in structure. The mean gradient obtained across the aortic valve is 7 mmHg.     Mitral Valve:  The mitral valve is mildly sclerotic. The pressure half time is 62 msec. The calculated mitral valve area is 3.55 cm2. There is mild to moderate mitral regurgitation. There is mitral annular calcification.     Tricuspid Valve:  The tricuspid valve is normal in structure. There is mild tricuspid regurgitation.     Pulmonary Valve:  The pulmonic valve is not well seen.     Pericardium: There is evidence of a trivial pericardial effusion.     IVC: IVC is enlarged but collapses > 50% with a sniff, suggesting intermediate right atrial pressure of 8 mmHg.     Intracavitary: There is no evidence of  intracavity mass, thrombi, or vegetation.         CONCLUSIONS     1 - Normal left ventricular systolic function (EF 55-60%).     2 - Normal left ventricular diastolic function.     3 - Normal right ventricular systolic function .     4 - Mild to moderate mitral regurgitation.     5 - Concentric hypertrophy.             This document has been electronically    SIGNED BY: Pete Durán MD On: 10/09/2019 20:00   , EKG: Reviewed and X-Ray: CXR: X-Ray Chest 1 View (CXR): No results found for this visit on 02/12/20. and X-Ray Chest PA and Lateral (CXR): No results found for this visit on 02/12/20.

## 2020-02-13 NOTE — NURSING
Discharge instructions provided and reviewed with patient  IV removed  Telemetry removed and returned  Pt properly dressed for weather  Pt ate prior to discharge  Prescriptions provided by retail pharmacy as ordered   Pt medicated prior to discharge, refer to MAR for medication administration  Pt left via wheelchair safely out of hospital with all personal belongings  Jesus Patel 02/13/2020 2:15 PM

## 2020-02-13 NOTE — ASSESSMENT & PLAN NOTE
-Continue BB and Imdur  -Continue ARB  -Would be cautious with Aldactone given elevated K upon admission  -Can f/u in clinic for further adjustments

## 2020-02-13 NOTE — PLAN OF CARE
CM met with patient at bedside to assess for discharge needs. Pt states that she lives at [email or web address] and is mainly independent with her needs. She uses a cane and a walker for assistance. She denies having home health services or the need for anything post discharge. DC plan dependent on hospital progress at this time. CM provided a transitional care folder, information on advanced directives, information on pharmacy bedside delivery, and discharge planning begins on admission with contact information for any needs/questions.    D/C Plan: Home  PCP: Yandy Terrell MD  Preferred Pharmacy: [facility]  Discharge transportation: Family  My [facility]: Active  Pharmacy Bedside Delivery: Yes       02/13/20 1015   Discharge Assessment   Assessment Type Discharge Planning Assessment   Confirmed/corrected address and phone number on facesheet? Yes   Assessment information obtained from? Patient   Expected Length of Stay (days)   (TBD)   Communicated expected length of stay with patient/caregiver yes   Prior to hospitilization cognitive status: Alert/Oriented   Prior to hospitalization functional status: Independent;Assistive Equipment   Current cognitive status: Alert/Oriented   Current Functional Status: Independent;Assistive Equipment   Facility Arrived From: [facility]   Lives With alone  ([place])   Able to Return to Prior Arrangements yes   Is patient able to care for self after discharge? Yes   Who are your caregiver(s) and their phone number(s)? Arie Bettencourt, Son- [phone]; Yandy Bettencourt, Daughter- [phone]   Patient's perception of discharge disposition home or selfcare   Readmission Within the Last 30 Days no previous admission in last 30 days   Patient currently being followed by outpatient case management? No   Patient currently receives any other outside agency services? No   Equipment Currently Used at Home walker, rolling;cane, straight   Do  you have any problems affording any of your prescribed medications? No   Is the patient taking medications as prescribed? yes   Does the patient have transportation home? Yes   Transportation Anticipated family or friend will provide   Dialysis Name and Scheduled days NA   Does the patient receive services at the [facility]mad[facility] Clinic? No   Discharge Plan A Home   DME Needed Upon Discharge  none   Patient/Family in Agreement with Plan yes

## 2020-02-13 NOTE — H&P
[facility]  History & Physical    Patient Name: Shirley Metz  MRN: [id number]  Admission Date: 2/12/2020  Attending Physician: Trang Tobar MD   Primary Care Provider: Yandy Terrell MD         Patient information was obtained from patient, relative(s), past medical records and ER records.     Subjective:     Principal Problem:Atrial flutter with rapid ventricular response    Chief Complaint:   Chief Complaint   Patient presents with    Irregular Heart Beat     pt reports irregular pulse, blurred vision, and weakness x 2 days        HPI: 86 y/o female with PMHx of AF with RVR who presented to the ED with c/o SOB that onset gradually 2 days ago. associated symptoms include palpitations, nausea, and dizziness. Symptoms are constant and moderate in severity. No mitigating or exacerbating factors reported. Patient denies any fever, cough, CP, emesis, weakness/numbness, and all other sxs at this time.  Patient recently started on doxazosin by PCP. Patient states she has taken 2 doses and thinks current sx are a reaction to doxazosin.   She will be kept on OBS for a fib with RVR under the care of [facility].     Advance Care Planning   she is a full code and her SMD is her son, Kirt         Past Medical History:   Diagnosis Date    Anemia 11/29/2018    Anxiety 11/28/2017    Arthritis     Atrial fibrillation with RVR 10/9/2019    Back pain     Basal cell carcinoma 03/29/2018    left mid back    Chronic diastolic congestive heart failure 10/15/2019    Colon polyps     reported per patient.     Fuchs' corneal dystrophy     General anesthetics causing adverse effect in therapeutic use     woke up during surgery and gagged on ET tube    Glaucoma     Glaucoma     Heart failure with preserved left ventricular function (HFpEF) 7/24/2018    Hyperlipidemia     Hypertension     Macular degeneration dry    Obesity     Squamous cell carcinoma  01/08/2019    left shoulder    Trouble in sleeping     Urinary tract infection        Past Surgical History:   Procedure Laterality Date    ADENOIDECTOMY  1938    BREAST BIOPSY Left     1997. benign    BREAST CYST EXCISION Left 1997 approx    CARPAL TUNNEL RELEASE Right 2012 approx    CATARACT EXTRACTION Bilateral 1994    CHOLECYSTECTOMY  1975    CORNEAL TRANSPLANT Bilateral 08/2015 8/2015 - left; Right 4/2016    EYE SURGERY      Fuch's Corneal dystrophy - had 2nd operation with Dr. Quintanilla    EYE SURGERY      Cataract wIOL    left thumb Left 2011    removed cuboid for arthritis    REVERSE TOTAL SHOULDER ARTHROPLASTY Left 8/21/2018    Procedure: ARTHROPLASTY, SHOULDER, TOTAL, REVERSE;  Surgeon: Solomon Lujan MD;  Location: [facility] OR;  Service: Orthopedics;  Laterality: Left;  WITH BICEP TENODESIS    SKIN CANCER EXCISION Left 2017    BCC removal by Dr. Valadez    SLT- OS (aka TRABECULOPLASTY) Left 05/11/2011    repeat 3/14/12    TENOTOMY Left 8/21/2018    Procedure: TENOTOMY;  Surgeon: Solomon Lujan MD;  Location: [facility] OR;  Service: Orthopedics;  Laterality: Left;    TONSILLECTOMY  1938    TREATMENT OF CARDIAC ARRHYTHMIA N/A 10/10/2019    Procedure: CARDIOVERSION;  Surgeon: Pete Durán MD;  Location: [facility] CATH LAB;  Service: Cardiology;  Laterality: N/A;    TREATMENT OF CARDIAC ARRHYTHMIA N/A 12/6/2019    Procedure: CARDIOVERSION;  Surgeon: Samy Castillo MD;  Location: [facility] CATH LAB;  Service: Cardiology;  Laterality: N/A;       Review of patient's allergies indicates:   Allergen Reactions    Claritin [loratadine] Other (See Comments)     Double vision/spots    Hydrocodone-acetaminophen      wheezing    Labetalol Shortness Of Breath, Nausea Only and Other (See Comments)     Palpitations, LE weakness    Naproxen      wheezing    Verapamil Diarrhea    Vibramycin [doxycycline calcium] Other (See Comments)     Weakness nausea   sob    Amlodipine      Myalgias      Coreg [carvedilol]  Swelling     lips    Fenofibrate      Causes muscle weakness    Hydralazine analogues      Muscle tremors/aches      Isosorbide     Lasix [furosemide]      myalgias    Moxifloxacin Other (See Comments)     Hives   muscle soreness    Timolol     Adhesive Rash     Clonidine patch - 2 mfg - contact dermatitis    Allegra [fexofenadine] Other (See Comments)     Double vision/spots     Codeine Diarrhea and Other (See Comments)     Loss of balance     Erythromycin Other (See Comments)     Complete weakness/could not walk    Hydrocortisone (bulk) Other (See Comments)     Only suppository/ caused muscle weakness    Macrolide antibiotics Other (See Comments)     Complete weakness/could not walk    Patanol [olopatadine] Other (See Comments)     Muscle weakness    Prednisone Anxiety    Statins-hmg-coa reductase inhibitors Other (See Comments)     Muscle weakness    Xalatan [latanoprost] Other (See Comments)     Muscle weakness       Current Facility-Administered Medications on File Prior to Encounter   Medication    sodium chloride 0.9% flush 3 mL     Current Outpatient Medications on File Prior to Encounter   Medication Sig    acetaminophen (TYLENOL) 500 MG tablet Take 500 mg by mouth daily as needed. Taking 1 to 3    ALPRAZolam (XANAX) 0.5 MG tablet TAKE 1/2 - 1 TABLET BY MOUTH NIGHTLY FOR SLEEP OR ANXIETY    apixaban (ELIQUIS) 2.5 mg Tab Take 1 tablet (2.5 mg total) by mouth 2 (two) times daily.    cholecalciferol, vitamin D3, (VITAMIN D3) 2,000 unit Cap Take 1 capsule by mouth once daily.    cloNIDine (CATAPRES) 0.1 MG tablet Take 2 tablets (0.2 mg total) by mouth 3 (three) times daily as needed (for BP more than 170/100).    coenzyme Q10 200 mg capsule Take 400 mg by mouth once daily.     doxazosin (CARDURA) 4 MG tablet Take 1 tablet (4 mg total) by mouth every evening.    fish oil-omega-3 fatty acids 300-1,000 mg capsule Take 2 g by mouth 2 (two) times daily.     isosorbide mononitrate (IMDUR) 30  MG 24 hr tablet Take 1 tablet (30 mg total) by mouth once daily.    LOTEMAX 0.5 % DrpG     metoprolol tartrate (LOPRESSOR) 25 MG tablet Take 1 tablet (25 mg total) by mouth 2 (two) times daily.    POLYETHYLENE GLYCOL 3350 (MIRALAX ORAL) Take by mouth as needed. Taking BID    RHOPRESSA 0.02 % Drop once daily.     spironolactone (ALDACTONE) 25 MG tablet Take 1 tablet (25 mg total) by mouth once daily.    TRAVATAN Z 0.004 % ophthalmic solution PLACE 1 DROP INTO THE LEFT EYE EVERY EVENING.    losartan (COZAAR) 50 MG tablet Take 2 tablets (100 mg total) by mouth once daily.     Family History     Problem Relation (Age of Onset)    Cancer Father (88)    Heart disease Mother    Hypertension Mother        Tobacco Use    Smoking status: Never Smoker    Smokeless tobacco: Never Used    Tobacco comment: history of passive smoking from her ex-   Substance and Sexual Activity    Alcohol use: Not Currently     Alcohol/week: 2.0 standard drinks     Types: 2 Standard drinks or equivalent per week     Frequency: Monthly or less     Drinks per session: 1 or 2     Binge frequency: Never     Comment: occasional     Drug use: No    Sexual activity: Not Currently     Partners: Male     Birth control/protection: Post-menopausal     Comment: discussed protection for STD's     Review of Systems   Constitutional: Negative for activity change, appetite change, chills, fatigue and fever.   HENT: Negative for congestion, dental problem, ear pain, hearing loss, mouth sores, sinus pressure, sore throat, tinnitus and trouble swallowing.    Eyes: Negative for pain, discharge, redness and visual disturbance.   Respiratory: Positive for shortness of breath. Negative for apnea, cough, choking, chest tightness and wheezing.    Cardiovascular: Positive for palpitations. Negative for chest pain and leg swelling.   Gastrointestinal: Positive for nausea. Negative for abdominal distention, abdominal pain, anal bleeding, blood in stool,  constipation, diarrhea, rectal pain and vomiting.   Endocrine: Negative for cold intolerance, heat intolerance, polydipsia and polyuria.   Genitourinary: Negative for difficulty urinating, dysuria, flank pain, frequency, hematuria and urgency.   Musculoskeletal: Negative for arthralgias, back pain, gait problem, joint swelling, myalgias, neck pain and neck stiffness.   Skin: Negative for color change, rash and wound.   Allergic/Immunologic: Negative for food allergies and immunocompromised state.   Neurological: Positive for dizziness. Negative for tremors, seizures, syncope, speech difficulty, light-headedness and headaches.   Hematological: Negative.    Psychiatric/Behavioral: Negative for agitation, behavioral problems, confusion and sleep disturbance. The patient is not nervous/anxious.    All other systems reviewed and are negative.    Objective:     Vital Signs (Most Recent):  Temp: 98.4 °F (36.9 °C) (02/12/20 1445)  Pulse: 106 (02/12/20 1645)  Resp: (!) 21 (02/12/20 1645)  BP: 102/65 (02/12/20 1645)  SpO2: 95 % (02/12/20 1645) Vital Signs (24h Range):  Temp:  [98.4 °F (36.9 °C)] 98.4 °F (36.9 °C)  Pulse:  [106-149] 106  Resp:  [18-22] 21  SpO2:  [94 %-100 %] 95 %  BP: (102-205)/(64-91) 102/65     Weight: 74.8 kg (165 lb)  Body mass index is 28.32 kg/m².    Physical Exam   Constitutional: She is oriented to person, place, and time. She appears well-developed and well-nourished. No distress.   HENT:   Head: Normocephalic and atraumatic.   Nose: Nose normal.   Mouth/Throat: Oropharynx is clear and moist.   Eyes: Pupils are equal, round, and reactive to light. EOM are normal. Right eye exhibits no discharge. Left eye exhibits no discharge.   Neck: Normal range of motion. Neck supple. No JVD present. No tracheal deviation present. No thyromegaly present.   Cardiovascular: Normal heart sounds and intact distal pulses. An irregularly irregular rhythm present. Tachycardia present. Exam reveals no gallop and no  friction rub.   No murmur heard.  Pulmonary/Chest: Effort normal and breath sounds normal. Tachypnea noted. No respiratory distress. She has no wheezes. She has no rales. She exhibits no tenderness.   Abdominal: Soft. Bowel sounds are normal. She exhibits no distension and no mass. There is no tenderness.   Genitourinary:   Genitourinary Comments: deferred   Musculoskeletal: Normal range of motion. She exhibits no edema, tenderness or deformity.   Neurological: She is alert and oriented to person, place, and time. No cranial nerve deficit. She exhibits normal muscle tone. Coordination normal.   Skin: Skin is warm and dry. Capillary refill takes less than 2 seconds. No rash noted. She is not diaphoretic. No erythema. No pallor.   Psychiatric: She has a normal mood and affect. Her behavior is normal.   Nursing note and vitals reviewed.        CRANIAL NERVES     CN III, IV, VI   Pupils are equal, round, and reactive to light.  Extraocular motions are normal.        Significant Labs:   Recent Lab Results       02/12/20  1530        Albumin 3.8     Alkaline Phosphatase 76     ALT 19     Anion Gap 12     aPTT 27.8  Comment:  aPTT therapeutic range = 39-69 seconds     AST 33     Baso # 0.05     Basophil% 0.7     BILIRUBIN TOTAL 0.5  Comment:  For infants and newborns, interpretation of results should be based  on gestational age, weight and in agreement with clinical  observations.  Premature Infant recommended reference ranges:  Up to 24 hours.............<8.0 mg/dL  Up to 48 hours............<12.0 mg/dL  3-5 days..................<15.0 mg/dL  6-29 days.................<15.0 mg/dL         Comment:  Values of less than 100 pg/ml are consistent with non-CHF populations.     BUN, Bld 25     Calcium 9.7     Chloride 106     CO2 21     Creatinine 1.3     Differential Method Automated     eGFR if  43     eGFR if non  37  Comment:  Calculation used to obtain the estimated glomerular  filtration  rate (eGFR) is the CKD-EPI equation.        Eos # 0.1     Eosinophil% 1.9     Glucose 133     Gran # (ANC) 4.9     Gran% 65.5     Hematocrit 42.1     Hemoglobin 13.5     Immature Grans (Abs) 0.01  Comment:  Mild elevation in immature granulocytes is non specific and   can be seen in a variety of conditions including stress response,   acute inflammation, trauma and pregnancy. Correlation with other   laboratory and clinical findings is essential.       Immature Granulocytes 0.1     INR 1.0  Comment:  Coumadin Therapy:  2.0 - 3.0 for INR for all indicators except mechanical heart valves  and antiphospholipid syndromes which should use 2.5 - 3.5.       Lymph # 1.6     Lymph% 21.4     Magnesium 2.1     MCH 31.3     MCHC 32.1     MCV 98     Mono # 0.8     Mono% 10.4     MPV 10.0     nRBC 0     Platelets 252     Potassium 5.6     PROTEIN TOTAL 8.0     Protime 10.7     RBC 4.32     RDW 13.2     Sodium 139     Troponin I 0.075  Comment:  The reference interval for Troponin I represents the 99th percentile   cutoff   for our facility and is consistent with 3rd generation assay   performance.       WBC 7.42         All pertinent labs within the past 24 hours have been reviewed.    Significant Imaging: I have reviewed all pertinent imaging results/findings within the past 24 hours.    Assessment/Plan:     * Atrial flutter with rapid ventricular response  2/12:  Currently on amiodarone drip  Cards consulted on case  Resumed eliquis  Metoprolol prn   Telemetry admission      Hyperkalemia  2/12:  K: 5.6  No renal dysfunction currently  No ekg changes  Possibly due to ace and aldactone use   Will hold   Repeat K this evening  Plan to treat should K worsen  Gently hydrate         Chronic diastolic congestive heart failure  2/12:  Resumed home meds  Holding losartan and aldactone due to hyperkalemia       HTN (hypertension)  2/12:  Resumed home meds  Clonidine PRN          VTE Risk Mitigation (From admission, onward)          Ordered     apixaban tablet 2.5 mg  2 times daily      02/12/20 1749                   JENNIFER ContrerasP-C  Department of Hospital Medicine   [facility]

## 2020-02-13 NOTE — ASSESSMENT & PLAN NOTE
2/12:  Currently on amiodarone drip  Cards consulted on case  Resumed eliquis  Metoprolol prn   Telemetry admission

## 2020-02-13 NOTE — HOSPITAL COURSE
2/13/2020-Patient seen and examined today, resting in bed. Feels well. No complaints. Converted to SR. Labs reviewed, K normal.

## 2020-02-13 NOTE — HOSPITAL COURSE
Patient was admitted for afib RVR. Patient was started on amiodarone drip and converted to NSR overnight. She was started on PO amiodarone and discharged home with instructions to follow up with cards outpatient.

## 2020-02-13 NOTE — PLAN OF CARE
Patient AAOx4.  Piv clean dry and intact.   Amio drip infusing. Amio drip decreased to 16.7mL/hr per order.  No falls this shift   Will continue to monitor.

## 2020-02-13 NOTE — ASSESSMENT & PLAN NOTE
2/12:  K: 5.6  No renal dysfunction currently  No ekg changes  Possibly due to ace and aldactone use   Will hold   Repeat K this evening  Plan to treat should K worsen  Gently hydrate

## 2020-02-13 NOTE — ASSESSMENT & PLAN NOTE
Afib/Flutter  Will load with IV amio  Cont Eliquis and BB  DCCV on Friday if she does not convert    2/13/2020  -Converted to SR overnight  -D/C amiodarone gtt; start amiodarone 200 mg BID  -Continue BB  -Continue Eliquis for CVA prophylaxis

## 2020-02-14 ENCOUNTER — PATIENT MESSAGE (OUTPATIENT)
Dept: CARDIOLOGY | Facility: CLINIC | Age: 85
End: 2020-02-14

## 2020-02-14 NOTE — PLAN OF CARE
02/14/20 0709   Final Note   Assessment Type Final Discharge Note   Anticipated Discharge Disposition Home   Right Care Referral Info   Post Acute Recommendation No Care

## 2020-02-15 ENCOUNTER — NURSE TRIAGE (OUTPATIENT)
Dept: ADMINISTRATIVE | Facility: CLINIC | Age: 85
End: 2020-02-15

## 2020-02-15 ENCOUNTER — HOSPITAL ENCOUNTER (EMERGENCY)
Facility: HOSPITAL | Age: 85
Discharge: HOME | End: 2020-02-15
Attending: EMERGENCY MEDICINE
Payer: MEDICARE

## 2020-02-15 VITALS
HEART RATE: 69 BPM | RESPIRATION RATE: 20 BRPM | WEIGHT: 168 LBS | SYSTOLIC BLOOD PRESSURE: 192 MMHG | TEMPERATURE: 98 F | OXYGEN SATURATION: 98 % | HEIGHT: 64 IN | BODY MASS INDEX: 28.68 KG/M2 | DIASTOLIC BLOOD PRESSURE: 86 MMHG

## 2020-02-15 DIAGNOSIS — I10 ESSENTIAL HYPERTENSION: Primary | ICD-10-CM

## 2020-02-15 PROCEDURE — 25000003 PHARM REV CODE 250: Mod: HCNC | Performed by: EMERGENCY MEDICINE

## 2020-02-15 PROCEDURE — 99283 EMERGENCY DEPT VISIT LOW MDM: CPT | Mod: HCNC

## 2020-02-15 RX ORDER — HYDROCHLOROTHIAZIDE 25 MG/1
25 TABLET ORAL DAILY
Qty: 7 TABLET | Refills: 0 | Status: SHIPPED | OUTPATIENT
Start: 2020-02-15 | End: 2020-02-17 | Stop reason: SDUPTHER

## 2020-02-15 RX ORDER — HYDROCHLOROTHIAZIDE 25 MG/1
25 TABLET ORAL
Status: COMPLETED | OUTPATIENT
Start: 2020-02-15 | End: 2020-02-15

## 2020-02-15 RX ADMIN — HYDROCHLOROTHIAZIDE 25 MG: 25 TABLET ORAL at 05:02

## 2020-02-15 NOTE — ED PROVIDER NOTES
SCRIBE #1 NOTE: I, Molina Salazar, am scribing for, and in the presence of, Darell Spain MD. I have scribed the entire note.       History     Chief Complaint   Patient presents with    Hypertension     pt reports b/p at home 198/93 at home. takes b/p med in the am. did not take any tonight     Review of patient's allergies indicates:   Allergen Reactions    Claritin [loratadine] Other (See Comments)     Double vision/spots    Hydrocodone-acetaminophen      wheezing    Labetalol Shortness Of Breath, Nausea Only and Other (See Comments)     Palpitations, LE weakness    Naproxen      wheezing    Verapamil Diarrhea    Vibramycin [doxycycline calcium] Other (See Comments)     Weakness nausea   sob    Amlodipine      Myalgias      Coreg [carvedilol] Swelling     lips    Fenofibrate      Causes muscle weakness    Hydralazine analogues      Muscle tremors/aches      Isosorbide     Lasix [furosemide]      myalgias    Moxifloxacin Other (See Comments)     Hives   muscle soreness    Timolol     Adhesive Rash     Clonidine patch - 2 mfg - contact dermatitis    Allegra [fexofenadine] Other (See Comments)     Double vision/spots     Codeine Diarrhea and Other (See Comments)     Loss of balance     Doxazosin Palpitations    Erythromycin Other (See Comments)     Complete weakness/could not walk    Hydrocortisone (bulk) Other (See Comments)     Only suppository/ caused muscle weakness    Macrolide antibiotics Other (See Comments)     Complete weakness/could not walk    Patanol [olopatadine] Other (See Comments)     Muscle weakness    Prednisone Anxiety    Statins-hmg-coa reductase inhibitors Other (See Comments)     Muscle weakness    Xalatan [latanoprost] Other (See Comments)     Muscle weakness         History of Present Illness     [facility]    2/15/2020, 3:41 AM  History obtained from the patient      History of Present Illness: Shirley Metz is a 87 y.o. female patient with a PMHx of anemia,  anxiety, arthritis, A Fib with RVR, back pain, chronic diastolic congestive heart failure, colon polyps, HLD, HTN, and UTI who presents to the Emergency Department for evaluation of hypertension which onset gradually today. Pt states that her blood pressure measured 198/93 at home and took nothing tonight. Pt took her morning dose of Losartan 100 mg. Pt is currently Rx'd Elequis, Losartan, Metoprolol, and (recently) amiodarone. Symptoms are constant and moderate in severity. No mitigating or exacerbating factors reported. No associated sxs reported. Patient denies any fever, diaphoresis, N/V/D, appetite changes, HA, dizziness, blurred vision, CP, SOB, neck pain, back pain, and all other sxs at this time. Prior Tx includes Losartan as Rx'd without sx improvement. No further complaints or concerns at this time.       Arrival mode: Personal vehicle    PCP: Yandy Terrell MD        Past Medical History:  Past Medical History:   Diagnosis Date    Anemia 11/29/2018    Anxiety 11/28/2017    Arthritis     Atrial fibrillation with RVR 10/9/2019    Back pain     Basal cell carcinoma 03/29/2018    left mid back    Chronic diastolic congestive heart failure 10/15/2019    Colon polyps     reported per patient.     Fuchs' corneal dystrophy     General anesthetics causing adverse effect in therapeutic use     woke up during surgery and gagged on ET tube    Glaucoma     Glaucoma     Heart failure with preserved left ventricular function (HFpEF) 7/24/2018    Hyperlipidemia     Hypertension     Macular degeneration dry    Obesity     Squamous cell carcinoma 01/08/2019    left shoulder    Trouble in sleeping     Urinary tract infection        Past Surgical History:  Past Surgical History:   Procedure Laterality Date    ADENOIDECTOMY  1938    BREAST BIOPSY Left     1997. benign    BREAST CYST EXCISION Left 1997 approx    CARPAL TUNNEL RELEASE Right 2012 approx    CATARACT EXTRACTION Bilateral 1994     CHOLECYSTECTOMY  1975    CORNEAL TRANSPLANT Bilateral 08/2015 8/2015 - left; Right 4/2016    EYE SURGERY      Fuch's Corneal dystrophy - had 2nd operation with Dr. Quintanilla    EYE SURGERY      Cataract wIOL    left thumb Left 2011    removed cuboid for arthritis    REVERSE TOTAL SHOULDER ARTHROPLASTY Left 8/21/2018    Procedure: ARTHROPLASTY, SHOULDER, TOTAL, REVERSE;  Surgeon: Solomon Lujan MD;  Location: [facility] OR;  Service: Orthopedics;  Laterality: Left;  WITH BICEP TENODESIS    SKIN CANCER EXCISION Left 2017    BCC removal by Dr. Valadez    SLT- OS (aka TRABECULOPLASTY) Left 05/11/2011    repeat 3/14/12    TENOTOMY Left 8/21/2018    Procedure: TENOTOMY;  Surgeon: Solomon Lujan MD;  Location: [facility] OR;  Service: Orthopedics;  Laterality: Left;    TONSILLECTOMY  1938    TREATMENT OF CARDIAC ARRHYTHMIA N/A 10/10/2019    Procedure: CARDIOVERSION;  Surgeon: Pete Durán MD;  Location: [facility] CATH LAB;  Service: Cardiology;  Laterality: N/A;    TREATMENT OF CARDIAC ARRHYTHMIA N/A 12/6/2019    Procedure: CARDIOVERSION;  Surgeon: Samy Castillo MD;  Location: [facility] CATH LAB;  Service: Cardiology;  Laterality: N/A;         Family History:  Family History   Problem Relation Age of Onset    Heart disease Mother     Hypertension Mother     Cancer Father 88        sarcoma( ?Kaposi's ) on leg    Deep vein thrombosis Neg Hx     Ovarian cancer Neg Hx     Breast cancer Neg Hx     Kidney disease Neg Hx     Strabismus Neg Hx     Retinal detachment Neg Hx     Macular degeneration Neg Hx     Glaucoma Neg Hx     Blindness Neg Hx     Amblyopia Neg Hx     Eczema Neg Hx     Lupus Neg Hx     Melanoma Neg Hx     Psoriasis Neg Hx     Diabetes Neg Hx     Stroke Neg Hx     Mental retardation Neg Hx     Mental illness Neg Hx     Hyperlipidemia Neg Hx     COPD Neg Hx     Asthma Neg Hx     Depression Neg Hx     Alcohol abuse Neg Hx     Drug abuse Neg Hx        Social History:  Social History     Tobacco  Use    Smoking status: Never Smoker    Smokeless tobacco: Never Used    Tobacco comment: history of passive smoking from her ex-   Substance and Sexual Activity    Alcohol use: Not Currently     Alcohol/week: 2.0 standard drinks     Types: 2 Standard drinks or equivalent per week     Frequency: Monthly or less     Drinks per session: 1 or 2     Binge frequency: Never     Comment: occasional     Drug use: No    Sexual activity: Not Currently     Partners: Male     Birth control/protection: Post-menopausal     Comment: discussed protection for STD's        Review of Systems     Review of Systems   Constitutional: Negative for appetite change, chills, diaphoresis and fever.   HENT: Negative for sore throat.    Eyes: Negative for visual disturbance.   Respiratory: Negative for cough and shortness of breath.    Cardiovascular: Negative for chest pain and leg swelling.   Gastrointestinal: Negative for abdominal pain, diarrhea, nausea and vomiting.   Genitourinary: Negative for dysuria.   Musculoskeletal: Negative for back pain, neck pain and neck stiffness.   Skin: Negative for rash and wound.   Neurological: Negative for dizziness, weakness, light-headedness, numbness and headaches.   Hematological: Does not bruise/bleed easily.   All other systems reviewed and are negative.     Physical Exam     Initial Vitals [02/15/20 0316]   BP Pulse Resp Temp SpO2   (!) 218/99 71 18 97.7 °F (36.5 °C) 96 %      MAP       --          Physical Exam  Nursing Notes and Vital Signs Reviewed.  Constitutional: Patient is in no acute distress. Well-developed and well-nourished.  Head: Atraumatic. Normocephalic.  Eyes: PERRL. EOM intact. Conjunctivae are not pale. No scleral icterus.  ENT: Mucous membranes are moist. Oropharynx is clear and symmetric.    Neck: Supple. Full ROM. No lymphadenopathy.  Cardiovascular: Regular rate. Regular rhythm. No murmurs, rubs, or gallops. Distal pulses are 2+ and symmetric.  Pulmonary/Chest: No  "respiratory distress. Clear to auscultation bilaterally. No wheezing or rales.  Abdominal: Soft and non-distended.  There is no tenderness.  No rebound, guarding, or rigidity. Good bowel sounds.  Genitourinary: No CVA tenderness  Musculoskeletal: Moves all extremities. No obvious deformities. No edema. No calf tenderness.  Skin: Warm and dry.  Neurological:  Alert, awake, and appropriate.  Normal speech.  No acute focal neurological deficits are appreciated.  Psychiatric: Normal affect. Good eye contact. Appropriate in content.     ED Course   Procedures  ED Vital Signs:  Vitals:    02/15/20 0316 02/15/20 0333 02/15/20 0402 02/15/20 0431   BP: (!) 218/99 (!) 229/95 (!) 193/84    Pulse: 71 73 66 69   Resp: 18 18 16 18   Temp: 97.7 °F (36.5 °C)      TempSrc: Oral      SpO2: 96% 97% 97% 99%   Weight: 76.2 kg (167 lb 15.9 oz)      Height: 5' 4" (1.626 m)       02/15/20 0504 02/15/20 0531 02/15/20 0600   BP: (!) 194/84  (!) 192/86   Pulse: 67 68 69   Resp: 18 17 20   Temp:      TempSrc:      SpO2: 99% 98% 98%   Weight:      Height:          Abnormal Lab Results:  Labs Reviewed - No data to display     All Lab Results:  None    Imaging Results:  Imaging Results    None                 The Emergency Provider reviewed the vital signs and test results, which are outlined above.     ED Discussion     5:14 AM: Reassessed pt at this time. Discussed with pt all pertinent ED information and results. Discussed pt dx and plan of tx. Gave pt all f/u and return to the ED instructions. All questions and concerns were addressed at this time. Pt expresses understanding of information and instructions, and is comfortable with plan to discharge. Pt is stable for discharge.    Pre-hypertension/Hypertension: The pt has been informed that they may have pre-hypertension or hypertension based on a blood pressure reading in the ED. I recommend that the pt call the PCP listed on their discharge instructions or a physician of their choice this " week to arrange f/u for further evaluation of possible pre-hypertension or hypertension.    I discussed with patient and/or family/caretaker that evaluation in the ED does not suggest any emergent or life threatening medical conditions requiring immediate intervention beyond what was provided in the ED, and I believe patient is safe for discharge.  Regardless, an unremarkable evaluation in the ED does not preclude the development or presence of a serious of life threatening condition. As such, patient was instructed to return immediately for any worsening or change in current symptoms.       Medical Decision Making:   Patient presents to the emergency department for asymptomatic hypertension; patient has a known history of hypertension;  she is being treated by her cardiologist; she has numerous allergies reported including numerous hypertension medications making treatment of hypertension difficult; 1 medication that she is not on and not allergic to is hydrochlorothiazide; will provide patient with a 7 day prescription of hydrochlorothiazide to take to bridge her to her cardiology appointment, where she will receive further long-term management; discharged home stable condition           ED Medication(s):  Medications   hydroCHLOROthiazide tablet 25 mg (25 mg Oral Given 2/15/20 0523)       Discharge Medication List as of 2/15/2020  5:16 AM      START taking these medications    Details   hydroCHLOROthiazide (HYDRODIURIL) 25 MG tablet Take 1 tablet (25 mg total) by mouth once daily. for 7 days, Starting Sat 2/15/2020, Until Sat 2/22/2020, Print             Follow-up Information     Schedule an appointment as soon as possible for a visit  with Trevon Ramirez Md, MD.    Specialties:  Cardiology, Internal Medicine  Contact information:  [id number]  [place] [address]  [phone]             [facility] - .    Specialty:  Emergency Medicine  Why:  As needed, If symptoms worsen  Contact  information:  [address] [facility] [address] [phone]                     Scribe Attestation:   Scribe #1: I performed the above scribed service and the documentation accurately describes the services I performed. I attest to the accuracy of the note.     Attending:   Physician Attestation Statement for Scribe #1: I, Darell Spain MD, personally performed the services described in this documentation, as scribed by Molina Salazar, in my presence, and it is both accurate and complete.           Clinical Impression       ICD-10-CM ICD-9-CM   1. Essential hypertension I10 401.9       Disposition:   Disposition: Discharged  Condition: Stable       Darell Spain MD  02/16/20 4204

## 2020-02-15 NOTE — ED NOTES
Pt to ED c/o high blood pressure. States she was seen in the ER a couple of days ago for same and started on amiodarone. Pt denies any symptoms/complaints.

## 2020-02-15 NOTE — TELEPHONE ENCOUNTER
Eliquis, metoprolol, and xanax, and amiodarone @1930 on     Reason for Disposition   [1] Systolic BP  >= 200 OR Diastolic >= 120  AND [2] having NO cardiac or neurologic symptoms    Additional Information   Negative: Difficult to awaken or acting confused (e.g., disoriented, slurred speech)   Negative: Severe difficulty breathing (e.g., struggling for each breath, speaks in single words)   Negative: [1] Weakness of the face, arm or leg on one side of the body AND [2] new onset   Negative: [1] Numbness (i.e., loss of sensation) of the face, arm or leg on one side of the body AND [2] new onset   Negative: [1] Chest pain lasts > 5 minutes AND [2] history of heart disease  (i.e., heart attack, bypass surgery, angina, angioplasty, CHF)   Negative: [1] Chest pain AND [2] took nitrogylcerin AND [3] pain was not relieved   Negative: Sounds like a life-threatening emergency to the triager   Negative: [1] Systolic BP  >= 160 OR Diastolic >= 100 AND [2] cardiac or neurologic symptoms (e.g., chest pain, difficulty breathing, unsteady gait, blurred vision)   Negative: [1] Pregnant > 20 weeks AND [2] new hand or face swelling   Negative: [1] Pregnant > 20 weeks AND [2] BP Systolic BP  >= 140 OR Diastolic >= 90    Protocols used: HIGH BLOOD PRESSURE-A-AH    Current bp 194/94 HR 71, but @11pm 203/93 HR 69. Patient denies symptoms (no SOB, no chest pain, no vision changes, no dizziness, no swelling, etc.). She states they told her several months ago to stop taking clonidine because she was probably taking too much. The d/c notes and AVS from patient's recent OBS stay on 2/12 to 2/13 indicates she is to continue the doxazosin and the clonidine. So, after asking the patient questions about when the changes occurred with the clonidine, I told her I needed to call Dr. Ramirez.     Dr. Ramirez informed that per protocol I would have to send her to the ED because of her bp alone although symptomatic. However, clonidine was never  d/c'd and would I tell the patient to take it as prescribed though she was told several months ago to stop it because she was taking too much. Dr. Ramirez states he never told her that and because that was an issue before she should be seen in the ED tonight. If they d/c her and tell her to monitor at home, Dr. Ramirez states to let her know this is ok. If they keep her in OBS overnight until the morning, he will see her in the hospital.     Went back to the line with the patient to discuss. She states she has been to the ED 5 times since the end of last year and asked why again. I explained Dr. Ramirez doesn't want her to resume the clonidine if she was told not take it by another provider. Mrs. Sg Metz verbalized understanding.

## 2020-02-17 ENCOUNTER — PATIENT OUTREACH (OUTPATIENT)
Dept: ADMINISTRATIVE | Facility: CLINIC | Age: 85
End: 2020-02-17

## 2020-02-17 ENCOUNTER — TELEPHONE (OUTPATIENT)
Dept: CARDIOLOGY | Facility: CLINIC | Age: 85
End: 2020-02-17

## 2020-02-17 ENCOUNTER — OFFICE VISIT (OUTPATIENT)
Dept: CARDIOLOGY | Facility: CLINIC | Age: 85
End: 2020-02-17
Payer: MEDICARE

## 2020-02-17 VITALS
OXYGEN SATURATION: 94 % | SYSTOLIC BLOOD PRESSURE: 130 MMHG | DIASTOLIC BLOOD PRESSURE: 64 MMHG | HEIGHT: 64 IN | BODY MASS INDEX: 28.53 KG/M2 | WEIGHT: 167.13 LBS | HEART RATE: 64 BPM

## 2020-02-17 DIAGNOSIS — I11.9 HYPERTENSIVE LEFT VENTRICULAR HYPERTROPHY, WITHOUT HEART FAILURE: Chronic | ICD-10-CM

## 2020-02-17 DIAGNOSIS — E78.49 OTHER HYPERLIPIDEMIA: Chronic | ICD-10-CM

## 2020-02-17 DIAGNOSIS — I16.0 HYPERTENSIVE URGENCY: ICD-10-CM

## 2020-02-17 DIAGNOSIS — I50.32 CHRONIC DIASTOLIC CONGESTIVE HEART FAILURE: ICD-10-CM

## 2020-02-17 DIAGNOSIS — I50.30 HEART FAILURE WITH PRESERVED LEFT VENTRICULAR FUNCTION (HFPEF): ICD-10-CM

## 2020-02-17 DIAGNOSIS — I48.91 NEW ONSET A-FIB: ICD-10-CM

## 2020-02-17 DIAGNOSIS — I10 ESSENTIAL HYPERTENSION: Primary | Chronic | ICD-10-CM

## 2020-02-17 PROCEDURE — 1101F PT FALLS ASSESS-DOCD LE1/YR: CPT | Mod: HCNC,CPTII,S$GLB, | Performed by: INTERNAL MEDICINE

## 2020-02-17 PROCEDURE — 1159F PR MEDICATION LIST DOCUMENTED IN MEDICAL RECORD: ICD-10-PCS | Mod: HCNC,S$GLB,, | Performed by: INTERNAL MEDICINE

## 2020-02-17 PROCEDURE — 99214 PR OFFICE/OUTPT VISIT, EST, LEVL IV, 30-39 MIN: ICD-10-PCS | Mod: HCNC,S$GLB,, | Performed by: INTERNAL MEDICINE

## 2020-02-17 PROCEDURE — 99999 PR PBB SHADOW E&M-EST. PATIENT-LVL III: CPT | Mod: PBBFAC,HCNC,, | Performed by: INTERNAL MEDICINE

## 2020-02-17 PROCEDURE — 99214 OFFICE O/P EST MOD 30 MIN: CPT | Mod: HCNC,S$GLB,, | Performed by: INTERNAL MEDICINE

## 2020-02-17 PROCEDURE — 1159F MED LIST DOCD IN RCRD: CPT | Mod: HCNC,S$GLB,, | Performed by: INTERNAL MEDICINE

## 2020-02-17 PROCEDURE — 1101F PR PT FALLS ASSESS DOC 0-1 FALLS W/OUT INJ PAST YR: ICD-10-PCS | Mod: HCNC,CPTII,S$GLB, | Performed by: INTERNAL MEDICINE

## 2020-02-17 PROCEDURE — 99999 PR PBB SHADOW E&M-EST. PATIENT-LVL III: ICD-10-PCS | Mod: PBBFAC,HCNC,, | Performed by: INTERNAL MEDICINE

## 2020-02-17 RX ORDER — HYDROCHLOROTHIAZIDE 25 MG/1
25 TABLET ORAL DAILY
Qty: 30 TABLET | Refills: 3 | OUTPATIENT
Start: 2020-02-17 | End: 2020-05-18

## 2020-02-17 RX ORDER — CLONIDINE HYDROCHLORIDE 0.1 MG/1
0.2 TABLET ORAL 3 TIMES DAILY PRN
Qty: 180 TABLET | Refills: 5
Start: 2020-02-17 | End: 2020-05-07

## 2020-02-17 RX ORDER — ISOSORBIDE MONONITRATE 60 MG/1
60 TABLET, EXTENDED RELEASE ORAL DAILY
Qty: 30 TABLET | Refills: 0 | Status: SHIPPED | OUTPATIENT
Start: 2020-02-17 | End: 2020-03-13 | Stop reason: SDUPTHER

## 2020-02-17 NOTE — PROGRESS NOTES
Subjective:   Patient ID:  Shirley Metz is a 87 y.o. female who presents for cardiac consult of Hypertension and Shortness of Breath      Hypertension   Associated symptoms include shortness of breath.   Shortness of Breath   Pertinent negatives include no leg swelling or rash.     The patient came in today for cardiac consult of Hypertension and Shortness of Breath    Shirley Metz is a 87 y.o. female pt with HTN, HFpEF, hyperlipidemia, palpitations, CKD presents today for follow-up CV eval.    5/1/18  She has significant side effects from many BP meds, could not tolerate Verapamil recently. BP has improved, but sometimes BP gets too low - occasionally 119/53.   She thinks her BP is too low under 130 systolic and wants to decrease some meds. Discussed we can half the clonidine patch and monitor. Will continue other meds for now.She has a good log with BPs daily. Overall BP is well controlled now. Tries to eat low salt diet for the most part but is at Nursing home and can't always control the diet. Other main issue is her arm pain due to shoulder pain will see pain management specialist Dr. Chin.     5/24/18  Has a lot of pain in both arms, will be seeing Dr. Chin and then may need surgery by Dr. Lujan. Reviewed BP log - mostly 130s-140s, once 96/58. Occasional palpitations - usually with waking up or sleeping.     6/11/18  Pt has been getting painful rash at site of clonidine. Also has an infection possibly at left shoulder where procedure happened for shoulders. She is also seeing surgery today for shoulder pain. Pt also feels very weak due to clonidine and has trouble getting up with 0.2 mg patch. Last night BP went up to 190/71 and took a clonidine .1 mg PO dose which improved BP. Bp mildly elevated today but also in a lot of pain.     7/24/18  Pt is scheduled for shoulder surgery on shoulder Aug 21st. She has been getting accupuncture which has helped to walk. She has stopped  clonidine patch is taking pills BID/TID. BP overall have been well controlled, 140-150s/50-60s. Otherwise feels ok.     11/13/18  She is s/p L shoulder surgery currently undergoing PT. Pt had reverse joint repair/surgery due to a cyst in the joint/bone. Has another month of rehab at least. BP overall has been in 130s/60s. No further dizziness, has stopped metoprolol.   ECG - NSR    6/27/19  She had some allergy to chemicals at [facility] while something was sprayed. She had sneezing, runny nose. She was taking echinacea and other herbal meds which helped. She then took Benadryl and accupuncture. BP has been up for a month. She was off metoprolol, doubled clonidine and still elevated. Discussed wants lower HCTZ but BP has been normal at home as well.   ECG - NSR    2/17/20  She was admitted for afib RVR. Patient was started on amiodarone drip and converted to NSR overnight. She was started on PO amiodarone and discharged home with instructions to follow up with cards outpatient.  She went to ER after DC for HTN urgency - she was given HCTZ.      Patient feels no chest pain, no sob, no leg swelling, no PND,  no dizziness, no syncope, no CNS symptoms.    Patient is compliant with medications.    2D ECHO 3/2018  CONCLUSIONS     1 - Concentric hypertrophy.     2 - No wall motion abnormalities.     3 - Normal left ventricular systolic function (EF 55-60%).     4 - Normal left ventricular diastolic function.     5 - Normal right ventricular systolic function .     6 - Mild tricuspid regurgitation.       Past Medical History:   Diagnosis Date    Anemia 11/29/2018    Anxiety 11/28/2017    Arthritis     Atrial fibrillation with RVR 10/9/2019    Back pain     Basal cell carcinoma 03/29/2018    left mid back    Chronic diastolic congestive heart failure 10/15/2019    Colon polyps     reported per patient.     Fuchs' corneal dystrophy     General anesthetics causing adverse effect in therapeutic use     woke up during  surgery and gagged on ET tube    Glaucoma     Glaucoma     Heart failure with preserved left ventricular function (HFpEF) 7/24/2018    Hyperlipidemia     Hypertension     Macular degeneration dry    Obesity     Squamous cell carcinoma 01/08/2019    left shoulder    Trouble in sleeping     Urinary tract infection        Past Surgical History:   Procedure Laterality Date    ADENOIDECTOMY  1938    BREAST BIOPSY Left     1997. benign    BREAST CYST EXCISION Left 1997 approx    CARPAL TUNNEL RELEASE Right 2012 approx    CATARACT EXTRACTION Bilateral 1994    CHOLECYSTECTOMY  1975    CORNEAL TRANSPLANT Bilateral 08/2015 8/2015 - left; Right 4/2016    EYE SURGERY      Fuch's Corneal dystrophy - had 2nd operation with Dr. Quintanilla    EYE SURGERY      Cataract wIOL    left thumb Left 2011    removed cuboid for arthritis    REVERSE TOTAL SHOULDER ARTHROPLASTY Left 8/21/2018    Procedure: ARTHROPLASTY, SHOULDER, TOTAL, REVERSE;  Surgeon: Solomon Lujan MD;  Location: [facility] OR;  Service: Orthopedics;  Laterality: Left;  WITH BICEP TENODESIS    SKIN CANCER EXCISION Left 2017    BCC removal by Dr. Valadez    SLT- OS (aka TRABECULOPLASTY) Left 05/11/2011    repeat 3/14/12    TENOTOMY Left 8/21/2018    Procedure: TENOTOMY;  Surgeon: Solomon Lujan MD;  Location: [facility] OR;  Service: Orthopedics;  Laterality: Left;    TONSILLECTOMY  1938    TREATMENT OF CARDIAC ARRHYTHMIA N/A 10/10/2019    Procedure: CARDIOVERSION;  Surgeon: Pete Durán MD;  Location: [facility] CATH LAB;  Service: Cardiology;  Laterality: N/A;    TREATMENT OF CARDIAC ARRHYTHMIA N/A 12/6/2019    Procedure: CARDIOVERSION;  Surgeon: Samy Castillo MD;  Location: [facility] CATH LAB;  Service: Cardiology;  Laterality: N/A;       Social History     Tobacco Use    Smoking status: Never Smoker    Smokeless tobacco: Never Used    Tobacco comment: history of passive smoking from her ex-   Substance Use Topics    Alcohol use: Not Currently      Alcohol/week: 2.0 standard drinks     Types: 2 Standard drinks or equivalent per week     Frequency: Monthly or less     Drinks per session: 1 or 2     Binge frequency: Never     Comment: occasional     Drug use: No       Family History   Problem Relation Age of Onset    Heart disease Mother     Hypertension Mother     Cancer Father 88        sarcoma( ?[place]'s ) on leg    Deep vein thrombosis Neg Hx     Ovarian cancer Neg Hx     Breast cancer Neg Hx     Kidney disease Neg Hx     Strabismus Neg Hx     Retinal detachment Neg Hx     Macular degeneration Neg Hx     Glaucoma Neg Hx     Blindness Neg Hx     Amblyopia Neg Hx     Eczema Neg Hx     Lupus Neg Hx     Melanoma Neg Hx     Psoriasis Neg Hx     Diabetes Neg Hx     Stroke Neg Hx     Mental retardation Neg Hx     Mental illness Neg Hx     Hyperlipidemia Neg Hx     COPD Neg Hx     Asthma Neg Hx     Depression Neg Hx     Alcohol abuse Neg Hx     Drug abuse Neg Hx        Patient's Medications   New Prescriptions    No medications on file   Previous Medications    ACETAMINOPHEN (TYLENOL) 500 MG TABLET    Take 500 mg by mouth daily as needed. Taking 1 to 3    ALPRAZOLAM (XANAX) 0.5 MG TABLET    TAKE 1/2 - 1 TABLET BY MOUTH NIGHTLY FOR SLEEP OR ANXIETY    AMIODARONE (PACERONE) 200 MG TAB    Take 1 tablet (200 mg total) by mouth 2 (two) times daily.    APIXABAN (ELIQUIS) 2.5 MG TAB    Take 1 tablet (2.5 mg total) by mouth 2 (two) times daily.    CHOLECALCIFEROL, VITAMIN D3, (VITAMIN D3) 2,000 UNIT CAP    Take 1 capsule by mouth once daily.    COENZYME Q10 200 MG CAPSULE    Take 400 mg by mouth once daily.     FISH OIL-OMEGA-3 FATTY ACIDS 300-1,000 MG CAPSULE    Take 2 g by mouth 2 (two) times daily.     LOSARTAN (COZAAR) 50 MG TABLET    Take 2 tablets (100 mg total) by mouth once daily.    LOTEMAX 0.5 % DRPG        METOPROLOL TARTRATE (LOPRESSOR) 25 MG TABLET    Take 1 tablet (25 mg total) by mouth 2 (two) times daily.    POLYETHYLENE GLYCOL  3350 (MIRALAX ORAL)    Take by mouth as needed. Taking BID    RHOPRESSA 0.02 % DROP    once daily.     TRAVATAN Z 0.004 % OPHTHALMIC SOLUTION    PLACE 1 DROP INTO THE LEFT EYE EVERY EVENING.   Modified Medications    Modified Medication Previous Medication    CLONIDINE (CATAPRES) 0.1 MG TABLET cloNIDine (CATAPRES) 0.1 MG tablet       Take 2 tablets (0.2 mg total) by mouth 3 (three) times daily as needed (for BP more than 170/100).    Take 2 tablets (0.2 mg total) by mouth 3 (three) times daily as needed (for BP more than 170/100).    HYDROCHLOROTHIAZIDE (HYDRODIURIL) 25 MG TABLET hydroCHLOROthiazide (HYDRODIURIL) 25 MG tablet       Take 1 tablet (25 mg total) by mouth once daily.    Take 1 tablet (25 mg total) by mouth once daily. for 7 days    ISOSORBIDE MONONITRATE (IMDUR) 60 MG 24 HR TABLET isosorbide mononitrate (IMDUR) 30 MG 24 hr tablet       Take 1 tablet (60 mg total) by mouth once daily.    Take 1 tablet (30 mg total) by mouth once daily.   Discontinued Medications    No medications on file       Review of Systems   Constitutional: Negative.    HENT: Negative.    Eyes: Negative.    Respiratory: Positive for shortness of breath.    Cardiovascular: Negative for leg swelling.   Gastrointestinal: Negative.    Genitourinary: Negative.    Musculoskeletal: Positive for joint pain.   Skin: Negative for rash.   Neurological: Negative for dizziness.   Endo/Heme/Allergies: Negative.    Psychiatric/Behavioral: Negative.        Wt Readings from Last 3 Encounters:   02/17/20 75.8 kg (167 lb 1.7 oz)   02/15/20 76.2 kg (167 lb 15.9 oz)   02/13/20 76 kg (167 lb 8.8 oz)     Temp Readings from Last 3 Encounters:   02/15/20 97.7 °F (36.5 °C) (Oral)   02/13/20 98.4 °F (36.9 °C)   02/10/20 98.3 °F (36.8 °C) (Oral)     BP Readings from Last 3 Encounters:   02/17/20 130/64   02/15/20 (!) 192/86   02/13/20 (!) 145/67     Pulse Readings from Last 3 Encounters:   02/17/20 64   02/15/20 69   02/13/20 66       /64 (BP Location:  "Right arm, Patient Position: Sitting, BP Method: Small (Manual))   Pulse 64   Ht 5' 4" (1.626 m)   Wt 75.8 kg (167 lb 1.7 oz)   LMP  (LMP Unknown)   SpO2 (!) 94%   BMI 28.68 kg/m²     Objective:   Physical Exam   Constitutional: She is oriented to person, place, and time. She appears well-developed and well-nourished. No distress.   HENT:   Head: Normocephalic and atraumatic.   Nose: Nose normal.   Mouth/Throat: Oropharynx is clear and moist.   Eyes: Conjunctivae and EOM are normal. No scleral icterus.   Neck: Normal range of motion. Neck supple. No JVD present. No thyromegaly present.   Cardiovascular: Normal rate, regular rhythm, S1 normal and S2 normal. Exam reveals no gallop, no S3, no S4 and no friction rub.   No murmur heard.  Pulmonary/Chest: Effort normal and breath sounds normal. No stridor. No respiratory distress. She has no wheezes. She has no rales. She exhibits no tenderness.   Abdominal: Soft. Bowel sounds are normal. She exhibits no distension and no mass. There is no tenderness. There is no rebound.   Genitourinary:   Genitourinary Comments: Deferred   Musculoskeletal: Normal range of motion. She exhibits no edema, tenderness or deformity.   Lymphadenopathy:     She has no cervical adenopathy.   Neurological: She is alert and oriented to person, place, and time. She exhibits normal muscle tone. Coordination normal.   Skin: Skin is warm and dry. Rash (on anterior chest - 2x2 square of clonide) noted. She is not diaphoretic. There is erythema. No pallor.   Psychiatric: She has a normal mood and affect. Her behavior is normal. Judgment and thought content normal.   Nursing note and vitals reviewed.      Lab Results   Component Value Date     02/13/2020    K 4.0 02/13/2020     02/13/2020    CO2 25 02/13/2020    BUN 26 (H) 02/13/2020    CREATININE 1.4 02/13/2020     (H) 02/13/2020    HGBA1C 6.4 (H) 01/08/2020    MG 2.1 02/12/2020    AST 33 02/12/2020    ALT 19 02/12/2020    " ALBUMIN 3.8 02/12/2020    PROT 8.0 02/12/2020    BILITOT 0.5 02/12/2020    WBC 7.23 02/13/2020    HGB 12.9 02/13/2020    HCT 40.3 02/13/2020    MCV 99 (H) 02/13/2020     02/13/2020    INR 1.0 02/12/2020    TSH 0.849 10/09/2019    CHOL 233 (H) 08/01/2019    HDL 49 08/01/2019    LDLCALC 155.0 08/01/2019    TRIG 145 08/01/2019     (H) 02/12/2020     Assessment:      1. Essential hypertension    2. Other hyperlipidemia    3. Hypertensive left ventricular hypertrophy, without heart failure    4. Hypertensive urgency    5. Heart failure with preserved left ventricular function (HFpEF)    6. New onset a-fib    7. Chronic diastolic congestive heart failure        Plan:   1. HTN   - cont meds - increase imdur, cont rest of meds  - pt has numerous side effects to almost all other BP meds - norvasc, coreg, verapamil, BB  - cont low salt diet  - aldactone stopped due to hyperkalemia; repeat BMP and Mg in 2 weeks    2. HLD  - cont low manuel diet    3. HFpEF  - pt euvolemic  - cont low salt diet    4. CKD  - cont to monitor    5. PAF - in nSR   - cont cordell and Eliquis    Thank you for allowing me to participate in this patient's care. Please do not hesitate to contact me with any questions or concerns. Consult note has been forwarded to the referral physician.

## 2020-02-17 NOTE — PHYSICIAN QUERY
PT Name: Shirley Metz  MR #: [id number]    Physician Query Form - Atrial Flutter Specificity Clarification     CDS/: Kathy Marie               Contact information: [email or web address]    This form is a permanent document in the medical record.     Query Date: February 17, 2020    By submitting this query, we are merely seeking further clarification of documentation. Please utilize your independent clinical judgment when addressing the question(s) below.    The medical record contains the following:   Indicators     Supporting Clinical Findings Location in Medical Record   x Atrial Flutter Atrial flutter with rapid ventricular response  Afib/Flutter  Will load with IV amio  Cont Eliquis and BB  DCCV on Friday if she does not convert    2/13/2020  -Converted to SR overnight  -D/C amiodarone gtt; start amiodarone 200 mg BID  -Continue BB  -Continue Eliquis for CVA prophylaxis  Cards consult             Cards note 2/13   x EKG results Atrial flutter with variable A-V block  Rightward axis  Nonspecific ST and T wave abnormality  Abnormal ECG EKG 2/12    x Medication amiodarone in dextrose 150 mg/100 mL (1.5 mg/mL) loading dose 150 mg   Dose: 150 mg  Freq: Once Route: IV  Start: 02/12/20 1700 End: 02/12/20 1729    amiodarone 360 mg/200 mL (1.8 mg/mL) infusion   Rate: 16.7 mL/hr Dose: 0.5 mg/min  Freq: Continuous Route: IV  Start: 02/12/20 2315 End: 02/13/20 1114 MAR             MAR     Treatment      Other       Provider, please further specify the Atrial Flutter diagnosis.    [   ] Atypical   [  x ] Type I   [   ] Type II   [   ] Typical   [   ] Other (please specify):   [  ] Clinically Undetermined         Please document in your progress notes daily for the duration of treatment until resolved, and include in your discharge summary.

## 2020-02-17 NOTE — PHYSICIAN QUERY
PT Name: Shirley Metz  MR #: [id number]    Physician Query Form - Atrial Fibrillation Specificity     CDS/: Kathy Marie               Contact information: [email or web address]       This form is a permanent document in the medical record.     Query Date: February 17, 2020    By submitting this query, we are merely seeking further clarification of documentation. Please utilize your independent clinical judgment when addressing the question(s) below.    The medical record contains the following:   Indicators     Supporting Clinical Findings Location in Medical Record   x Atrial Fibrillation AFib on Eliquis s/p STACEY/DCCV    Atrial flutter with rapid ventricular response  Afib/Flutter  Will load with IV amio  Cont Eliquis and BB  DCCV on Friday if she does not convert     2/13/2020  -Converted to SR overnight  -D/C amiodarone gtt; start amiodarone 200 mg BID  -Continue BB  -Continue Eliquis for CVA prophylaxis  Cards note 2/13     Cards note 2/13      x EKG results Atrial flutter with variable A-V block  Rightward axis  Nonspecific ST and T wave abnormality  Abnormal ECG EKG 2/12    x Medication amiodarone in dextrose 150 mg/100 mL (1.5 mg/mL) loading dose 150 mg   Dose: 150 mg  Freq: Once Route: IV  Start: 02/12/20 1700 End: 02/12/20 1729     amiodarone 360 mg/200 mL (1.8 mg/mL) infusion   Rate: 16.7 mL/hr Dose: 0.5 mg/min  Freq: Continuous Route: IV  Start: 02/12/20 2315 End: 02/13/20 1114 MAR             MAR     Treatment      Other         Provider, please further specify the Atrial Fibrillation diagnosis.    [  ] Chronic Persistent atrial fibrillation   [  ] Chronic, unspecified atrial fibrillation   [  ] Other persistent atrial fibrillation   [  x] Other (please specify): PAF   [  ] Clinically Undetermined         Please document in your progress notes daily for the duration of treatment until resolved, and include in your discharge summary.

## 2020-02-17 NOTE — PATIENT INSTRUCTIONS
Discharge Instructions for Atrial Fibrillation  You have been diagnosed with an abnormal heart rhythm called atrial fibrillation. With this condition, your hearts 2 upper chambers quiver rather than squeeze the blood out in a normal pattern. This leads to an irregular and sometimes rapid heartbeat. Some people will develop associated symptoms such as a flip-flopping heartbeat, chest pain, lightheadedness, or shortness of breath. Other people may have no symptoms at all. Atrial fibrillation is serious because it affects the hearts ability to fill with blood as it should. Blood clots may form. This increases the risk for stroke. Untreated atrial fibrillation can also lead to heart failure. Atrial fibrillation can be controlled. With treatment, most people with atrial fibrillation lead normal lives.  Treatment options  Recommended treatment for atrial fibrillation depends on your age, symptoms, how long you have had atrial fibrillation, and other factors. You will have a complete evaluation to find out if you have any abnormalities that caused your heart to go into atrial fibrillation. This might be blocked heart arteries or a thyroid problem. Your doctor will assess your particular case and discuss choices with you.  Treatment choices may include:  · Treating an underlying disorder that puts you at risk for atrial fibrillation. For example, correcting an abnormal thyroid or electrolyte problem, or treating a blocked heart artery.  · Restoring a normal heart rhythm with an electrical shock (cardioversion) or with an antiarrhythmic medicine (chemical cardioversion)  · Using medicine to control your heart rate in atrial fibrillation.  · Preventing the risk for blood clot and stroke using blood-thinning medicines. Your doctor will tell you what he or she recommends. Choices may include aspirin, clopidogrel, warfarin, dabigatran, rivaroxaban, apixaban, and edoxaban.  · Doing catheter ablation or a surgical maze  procedure. These use different methods to destroy certain areas of heart tissue. This interrupts the electrical signals causing atrial fibrillation. One of these procedures may be a choice when medicines do not work, or as an alternative to long-term medicine.  · Other treatment choices may be recommended for you by your doctor.  Managing risk factors for stroke and preventing heart failure are important parts of any treatment plan for atrial fibrillation.  Home care  · Take your medicines exactly as directed. Dont skip doses.  · Work with your doctor to find the right medicines and doses for you.  · Learn to take your own pulse. Keep a record of your results. Ask your doctor which pulse rates mean that you need medical attention. Slowing your pulse is often the goal of treatment. Ask your doctor if its OK for you to use an automatic machine to check your pulse at home. Sometimes these machines dont count the pulse correctly when you have atrial fibrillation.  · Limit your intake of coffee, tea, cola, and other beverages with caffeine. Talk with your doctor about whether you should eliminate caffeine.  · Avoid over-the-counter medicines that have caffeine in them.  · Let your doctor know what medicines you take, including prescription and over-the-counter medicines, as well as any supplements. They interfere with some medicines given for atrial fibrillation.  · Ask your doctor about whether you can drink alcohol. Some people need to avoid alcohol to better treat atrial fibrillation. If you are taking blood-thinner medicines, alcohol may interfere with them by increasing their effect.  · Never take stimulants such as amphetamines or cocaine. These drugs can speed up your heart rate and trigger atrial fibrillation.  Follow-up care  Follow up with your doctor, or as advised.     When should I call my healthcare provider  Call your healthcare provider right away if you have any of the  following:  · Weakness  · Dizziness  · Fainting  · Fatigue  · Shortness of breath  · Chest pain with increased activity  · A change in the usual regularity of your heartbeat, or an unusually fast heartbeat   Date Last Reviewed: 4/23/2016  © 9590-3500 [employer]. [address] [place] [address], [place] [address]. All rights reserved. This information is not intended as a substitute for professional medical care. Always follow your healthcare professional's instructions.

## 2020-02-19 ENCOUNTER — NURSE TRIAGE (OUTPATIENT)
Dept: ADMINISTRATIVE | Facility: CLINIC | Age: 85
End: 2020-02-19

## 2020-02-19 ENCOUNTER — HOSPITAL ENCOUNTER (EMERGENCY)
Facility: HOSPITAL | Age: 85
Discharge: HOME | End: 2020-02-19
Attending: EMERGENCY MEDICINE
Payer: MEDICARE

## 2020-02-19 VITALS
RESPIRATION RATE: 20 BRPM | HEIGHT: 64 IN | TEMPERATURE: 98 F | WEIGHT: 167.75 LBS | HEART RATE: 78 BPM | BODY MASS INDEX: 28.64 KG/M2 | OXYGEN SATURATION: 99 % | SYSTOLIC BLOOD PRESSURE: 164 MMHG | DIASTOLIC BLOOD PRESSURE: 78 MMHG

## 2020-02-19 DIAGNOSIS — R07.89 LEFT-SIDED CHEST WALL PAIN: ICD-10-CM

## 2020-02-19 LAB
ALBUMIN SERPL BCP-MCNC: 4.3 G/DL (ref 3.5–5.2)
ALP SERPL-CCNC: 80 U/L (ref 55–135)
ALT SERPL W/O P-5'-P-CCNC: 16 U/L (ref 10–44)
ANION GAP SERPL CALC-SCNC: 12 MMOL/L (ref 8–16)
AST SERPL-CCNC: 20 U/L (ref 10–40)
BASOPHILS # BLD AUTO: 0.07 K/UL (ref 0–0.2)
BASOPHILS NFR BLD: 0.9 % (ref 0–1.9)
BILIRUB SERPL-MCNC: 0.8 MG/DL (ref 0.1–1)
BNP SERPL-MCNC: 160 PG/ML (ref 0–99)
BUN SERPL-MCNC: 31 MG/DL (ref 8–23)
CALCIUM SERPL-MCNC: 10.2 MG/DL (ref 8.7–10.5)
CHLORIDE SERPL-SCNC: 103 MMOL/L (ref 95–110)
CO2 SERPL-SCNC: 27 MMOL/L (ref 23–29)
CREAT SERPL-MCNC: 1.6 MG/DL (ref 0.5–1.4)
D DIMER PPP IA.FEU-MCNC: 0.79 MG/L FEU
DIFFERENTIAL METHOD: ABNORMAL
EOSINOPHIL # BLD AUTO: 0.4 K/UL (ref 0–0.5)
EOSINOPHIL NFR BLD: 4.5 % (ref 0–8)
ERYTHROCYTE [DISTWIDTH] IN BLOOD BY AUTOMATED COUNT: 13.1 % (ref 11.5–14.5)
EST. GFR  (AFRICAN AMERICAN): 33 ML/MIN/1.73 M^2
EST. GFR  (NON AFRICAN AMERICAN): 29 ML/MIN/1.73 M^2
GLUCOSE SERPL-MCNC: 103 MG/DL (ref 70–110)
HCT VFR BLD AUTO: 43.1 % (ref 37–48.5)
HGB BLD-MCNC: 14 G/DL (ref 12–16)
IMM GRANULOCYTES # BLD AUTO: 0.02 K/UL (ref 0–0.04)
IMM GRANULOCYTES NFR BLD AUTO: 0.3 % (ref 0–0.5)
LYMPHOCYTES # BLD AUTO: 2.1 K/UL (ref 1–4.8)
LYMPHOCYTES NFR BLD: 26.8 % (ref 18–48)
MCH RBC QN AUTO: 31.7 PG (ref 27–31)
MCHC RBC AUTO-ENTMCNC: 32.5 G/DL (ref 32–36)
MCV RBC AUTO: 98 FL (ref 82–98)
MONOCYTES # BLD AUTO: 0.7 K/UL (ref 0.3–1)
MONOCYTES NFR BLD: 8.5 % (ref 4–15)
NEUTROPHILS # BLD AUTO: 4.7 K/UL (ref 1.8–7.7)
NEUTROPHILS NFR BLD: 59 % (ref 38–73)
NRBC BLD-RTO: 0 /100 WBC
PLATELET # BLD AUTO: 252 K/UL (ref 150–350)
PMV BLD AUTO: 9.6 FL (ref 9.2–12.9)
POTASSIUM SERPL-SCNC: 4 MMOL/L (ref 3.5–5.1)
PROT SERPL-MCNC: 7.9 G/DL (ref 6–8.4)
RBC # BLD AUTO: 4.41 M/UL (ref 4–5.4)
SODIUM SERPL-SCNC: 142 MMOL/L (ref 136–145)
TROPONIN I SERPL DL<=0.01 NG/ML-MCNC: 0.04 NG/ML (ref 0–0.03)
WBC # BLD AUTO: 7.99 K/UL (ref 3.9–12.7)

## 2020-02-19 PROCEDURE — 84484 ASSAY OF TROPONIN QUANT: CPT | Mod: HCNC

## 2020-02-19 PROCEDURE — 85025 COMPLETE CBC W/AUTO DIFF WBC: CPT | Mod: HCNC

## 2020-02-19 PROCEDURE — 93005 ELECTROCARDIOGRAM TRACING: CPT | Mod: HCNC

## 2020-02-19 PROCEDURE — 80053 COMPREHEN METABOLIC PANEL: CPT | Mod: HCNC

## 2020-02-19 PROCEDURE — 93010 EKG 12-LEAD: ICD-10-PCS | Mod: HCNC,,, | Performed by: INTERNAL MEDICINE

## 2020-02-19 PROCEDURE — 99285 EMERGENCY DEPT VISIT HI MDM: CPT | Mod: 25,HCNC

## 2020-02-19 PROCEDURE — 93010 ELECTROCARDIOGRAM REPORT: CPT | Mod: HCNC,,, | Performed by: INTERNAL MEDICINE

## 2020-02-19 PROCEDURE — 25000003 PHARM REV CODE 250: Mod: HCNC | Performed by: EMERGENCY MEDICINE

## 2020-02-19 PROCEDURE — 83880 ASSAY OF NATRIURETIC PEPTIDE: CPT | Mod: HCNC

## 2020-02-19 PROCEDURE — 85379 FIBRIN DEGRADATION QUANT: CPT | Mod: HCNC

## 2020-02-19 RX ORDER — ACETAMINOPHEN 500 MG
1000 TABLET ORAL
Status: COMPLETED | OUTPATIENT
Start: 2020-02-19 | End: 2020-02-19

## 2020-02-19 RX ADMIN — ACETAMINOPHEN 1000 MG: 500 TABLET ORAL at 01:02

## 2020-02-19 NOTE — TELEPHONE ENCOUNTER
"Shirley called to report severe pain under her left arm, axilla. She stressed that it is not in her shoulder. States never had this pain before.  She states she made an appointment for 1000 with Dr Terrell, pcp, but the pain is excruciating and she cannot wait. Pain is not related to arm movement, and is present even when still. Of note, she did not take her BP this morning, but says she was seen in ED 02/12 for A flutter, and 02/15 for HTN; she did follow up with Dr Ramirez on 02/17/2020.  The pain was not present for any of those dates, and not discussed with Dr Ramirez.   Per [facility] triage protocol, recommend ED now for evaluation.  She does have someone to bring her and will go now. Message to Dr Ramirez and Yandy Terrell MD, pcp.  Please contact caller directly with any additional care advice.      Reason for Disposition   [1] Age > 40 AND [2] no obvious cause AND [3] pain even when not moving the arm    (Exception: pain is clearly made worse by moving arm or bending neck)    Additional Information   Negative: Shock suspected (e.g., cold/pale/clammy skin, too weak to stand, low BP, rapid pulse)   Negative: [1] Similar pain previously AND [2] it was from "heart attack"   Negative: [1] Similar pain previously AND [2] it was from "angina" AND [3] not relieved by nitroglycerin   Negative: Sounds like a life-threatening emergency to the triager   Negative: Followed an arm injury   Negative: Chest pain   Negative: Pain, redness, or swelling at intravenous (IV) site or along course of vein   Negative: Wound looks infected   Negative: Elbow pain is main symptom   Negative: Wrist pain is main symptom   Negative: Difficulty breathing or unusual sweating (e.g., sweating without exertion)   Negative: [1] Age > 40 AND [2] associated chest or jaw pain AND [3] pain lasts > 5 minutes    Protocols used: ARM PAIN-A-AH      "

## 2020-02-19 NOTE — ED PROVIDER NOTES
SCRIBE #1 NOTE: I, Daniel Manzano, am scribing for, and in the presence of, Jose Manuel Green MD. I have scribed the entire note.      History      Chief Complaint   Patient presents with    Pain     pt reports sharp pain to under L armpit that started last night      Review of patient's allergies indicates:   Allergen Reactions    Claritin [loratadine] Other (See Comments)     Double vision/spots    Hydrocodone-acetaminophen      wheezing    Labetalol Shortness Of Breath, Nausea Only and Other (See Comments)     Palpitations, LE weakness    Naproxen      wheezing    Verapamil Diarrhea    Vibramycin [doxycycline calcium] Other (See Comments)     Weakness nausea   sob    Amlodipine      Myalgias      Coreg [carvedilol] Swelling     lips    Fenofibrate      Causes muscle weakness    Hydralazine analogues      Muscle tremors/aches      Isosorbide     Lasix [furosemide]      myalgias    Moxifloxacin Other (See Comments)     Hives   muscle soreness    Timolol     Adhesive Rash     Clonidine patch - 2 mfg - contact dermatitis    Allegra [fexofenadine] Other (See Comments)     Double vision/spots     Codeine Diarrhea and Other (See Comments)     Loss of balance     Doxazosin Palpitations    Erythromycin Other (See Comments)     Complete weakness/could not walk    Hydrocortisone (bulk) Other (See Comments)     Only suppository/ caused muscle weakness    Macrolide antibiotics Other (See Comments)     Complete weakness/could not walk    Patanol [olopatadine] Other (See Comments)     Muscle weakness    Prednisone Anxiety    Statins-hmg-coa reductase inhibitors Other (See Comments)     Muscle weakness    Xalatan [latanoprost] Other (See Comments)     Muscle weakness        HPI   HPI    2/19/2020, 8:26 AM   History obtained from the patient      History of Present Illness: Shirley Metz is a 87 y.o. female patient with a PMHx of Afib RVR who presents to the Emergency Department for L axillary pain,  onset last night. Pt is concerned that she may have a blood clot in her LUE. Symptoms are constant and moderate in severity. No mitigating or exacerbating factors reported. No associated sxs reported. Patient denies any fever, chills, n/v/d, SOB, CP, weakness, numbness, dizziness, headache, and all other sxs at this time. Pt is currently on Eliquis. No further complaints or concerns at this time.       Arrival mode: Personal vehicle    PCP: Yandy Terrell MD     Past Medical History:  Past Medical History:   Diagnosis Date    Anemia 11/29/2018    Anxiety 11/28/2017    Arthritis     Atrial fibrillation with RVR 10/9/2019    Back pain     Basal cell carcinoma 03/29/2018    left mid back    Chronic diastolic congestive heart failure 10/15/2019    Colon polyps     reported per patient.     Fuchs' corneal dystrophy     General anesthetics causing adverse effect in therapeutic use     woke up during surgery and gagged on ET tube    Glaucoma     Glaucoma     Heart failure with preserved left ventricular function (HFpEF) 7/24/2018    Hyperlipidemia     Hypertension     Macular degeneration dry    Obesity     Squamous cell carcinoma 01/08/2019    left shoulder    Trouble in sleeping     Urinary tract infection        Past Surgical History:  Past Surgical History:   Procedure Laterality Date    ADENOIDECTOMY  1938    BREAST BIOPSY Left     1997. benign    BREAST CYST EXCISION Left 1997 approx    CARPAL TUNNEL RELEASE Right 2012 approx    CATARACT EXTRACTION Bilateral 1994    CHOLECYSTECTOMY  1975    CORNEAL TRANSPLANT Bilateral 08/2015 8/2015 - left; Right 4/2016    EYE SURGERY      Fuch's Corneal dystrophy - had 2nd operation with Dr. Quintanilla    EYE SURGERY      Cataract wIOL    left thumb Left 2011    removed cuboid for arthritis    REVERSE TOTAL SHOULDER ARTHROPLASTY Left 8/21/2018    Procedure: ARTHROPLASTY, SHOULDER, TOTAL, REVERSE;  Surgeon: Solomon Lujan MD;  Location: [facility] OR;   Service: Orthopedics;  Laterality: Left;  WITH BICEP TENODESIS    SKIN CANCER EXCISION Left 2017    BCC removal by Dr. Valadez    SLT- OS (aka TRABECULOPLASTY) Left 05/11/2011    repeat 3/14/12    TENOTOMY Left 8/21/2018    Procedure: TENOTOMY;  Surgeon: Solomon Lujan MD;  Location: [facility] OR;  Service: Orthopedics;  Laterality: Left;    TONSILLECTOMY  1938    TREATMENT OF CARDIAC ARRHYTHMIA N/A 10/10/2019    Procedure: CARDIOVERSION;  Surgeon: Pete Durán MD;  Location: [facility] CATH LAB;  Service: Cardiology;  Laterality: N/A;    TREATMENT OF CARDIAC ARRHYTHMIA N/A 12/6/2019    Procedure: CARDIOVERSION;  Surgeon: Samy Castillo MD;  Location: [facility] CATH LAB;  Service: Cardiology;  Laterality: N/A;         Family History:  Family History   Problem Relation Age of Onset    Heart disease Mother     Hypertension Mother     Cancer Father 88        sarcoma( ?Kaposi's ) on leg    Deep vein thrombosis Neg Hx     Ovarian cancer Neg Hx     Breast cancer Neg Hx     Kidney disease Neg Hx     Strabismus Neg Hx     Retinal detachment Neg Hx     Macular degeneration Neg Hx     Glaucoma Neg Hx     Blindness Neg Hx     Amblyopia Neg Hx     Eczema Neg Hx     Lupus Neg Hx     Melanoma Neg Hx     Psoriasis Neg Hx     Diabetes Neg Hx     Stroke Neg Hx     Mental retardation Neg Hx     Mental illness Neg Hx     Hyperlipidemia Neg Hx     COPD Neg Hx     Asthma Neg Hx     Depression Neg Hx     Alcohol abuse Neg Hx     Drug abuse Neg Hx        Social History:  Social History     Tobacco Use    Smoking status: Never Smoker    Smokeless tobacco: Never Used    Tobacco comment: history of passive smoking from her ex-   Substance and Sexual Activity    Alcohol use: Not Currently     Alcohol/week: 2.0 standard drinks     Types: 2 Standard drinks or equivalent per week     Frequency: Monthly or less     Drinks per session: 1 or 2     Binge frequency: Never     Comment: occasional     Drug use: No     Sexual activity: Not Currently     Partners: Male     Birth control/protection: Post-menopausal     Comment: discussed protection for STD's       ROS   Review of Systems   Constitutional: Negative for chills, diaphoresis, fatigue and fever.   HENT: Negative for sore throat.    Respiratory: Negative for shortness of breath.    Cardiovascular: Negative for chest pain.   Gastrointestinal: Negative for diarrhea, nausea and vomiting.   Genitourinary: Negative for dysuria.   Musculoskeletal: Positive for myalgias (L axilla). Negative for back pain.   Skin: Negative for rash.   Neurological: Negative for dizziness, weakness, light-headedness, numbness and headaches.   Hematological: Does not bruise/bleed easily.   All other systems reviewed and are negative.    Physical Exam      Initial Vitals [02/19/20 0727]   BP Pulse Resp Temp SpO2   (!) 195/80 72 18 98.4 °F (36.9 °C) 96 %      MAP       --          Physical Exam  Nursing Notes and Vital Signs Reviewed.  Constitutional: Patient is in no acute distress. Well-developed and well-nourished.  Head: Atraumatic. Normocephalic.  Eyes: PERRL. EOM intact. Conjunctivae are not pale. No scleral icterus.  ENT: Mucous membranes are moist. Oropharynx is clear and symmetric.    Neck: Supple. Full ROM. No lymphadenopathy.  Cardiovascular: Regular rate. Regular rhythm. No murmurs, rubs, or gallops. Distal pulses are 2+ and symmetric. Specifically, there are strong radial pulses in the LUE.  Pulmonary/Chest: Tenderness to palpation to the L chest wall, inferior to the axilla. No respiratory distress. Clear to auscultation bilaterally. No wheezing or rales.  Abdominal: Soft and non-distended.  There is no tenderness.  No rebound, guarding, or rigidity.   Musculoskeletal: Moves all extremities. No obvious deformities. No edema.  Skin: Warm and dry.  Neurological:  Alert, awake, and appropriate.  Normal speech.  No acute focal neurological deficits are appreciated.  Psychiatric: Normal  "affect. Good eye contact. Appropriate in content.    ED Course    Procedures  ED Vital Signs:  Vitals:    02/19/20 0727 02/19/20 0845 02/19/20 0900 02/19/20 0920   BP: (!) 195/80 (!) 173/106 (!) 175/76 (!) 164/78   Pulse: 72 71 65 67   Resp: 18 18 20 20   Temp: 98.4 °F (36.9 °C)      TempSrc: Oral      SpO2: 96% 97% 97% 96%   Weight: 76.1 kg (167 lb 12.3 oz)      Height: 5' 4" (1.626 m)       02/19/20 1030 02/19/20 1103 02/19/20 1117 02/19/20 1130   BP: (!) 177/84 (!) 183/84 (!) 179/77 (!) 168/59   Pulse: 80 74 73 75   Resp: 20 20 20 20   Temp:  98.1 °F (36.7 °C)     TempSrc:  Oral     SpO2: 99% 97% 96% 97%   Weight:       Height:        02/19/20 1220 02/19/20 1319   BP: (!) 177/84 (!) 164/78   Pulse: 72 78   Resp: 20 20   Temp:  97.8 °F (36.6 °C)   TempSrc:  Oral   SpO2: 96% 99%   Weight:     Height:         Abnormal Lab Results:  Labs Reviewed   CBC W/ AUTO DIFFERENTIAL - Abnormal; Notable for the following components:       Result Value    Mean Corpuscular Hemoglobin 31.7 (*)     All other components within normal limits   COMPREHENSIVE METABOLIC PANEL - Abnormal; Notable for the following components:    BUN, Bld 31 (*)     Creatinine 1.6 (*)     eGFR if  33 (*)     eGFR if non  29 (*)     All other components within normal limits   B-TYPE NATRIURETIC PEPTIDE - Abnormal; Notable for the following components:     (*)     All other components within normal limits   TROPONIN I - Abnormal; Notable for the following components:    Troponin I 0.038 (*)     All other components within normal limits   D DIMER, QUANTITATIVE - Abnormal; Notable for the following components:    D-Dimer 0.79 (*)     All other components within normal limits        All Lab Results:  Results for orders placed or performed during the hospital encounter of 02/19/20   CBC auto differential   Result Value Ref Range    WBC 7.99 3.90 - 12.70 K/uL    RBC 4.41 4.00 - 5.40 M/uL    Hemoglobin 14.0 12.0 - 16.0 g/dL "    Hematocrit 43.1 37.0 - 48.5 %    Mean Corpuscular Volume 98 82 - 98 fL    Mean Corpuscular Hemoglobin 31.7 (H) 27.0 - 31.0 pg    Mean Corpuscular Hemoglobin Conc 32.5 32.0 - 36.0 g/dL    RDW 13.1 11.5 - 14.5 %    Platelets 252 150 - 350 K/uL    MPV 9.6 9.2 - 12.9 fL    Immature Granulocytes 0.3 0.0 - 0.5 %    Gran # (ANC) 4.7 1.8 - 7.7 K/uL    Immature Grans (Abs) 0.02 0.00 - 0.04 K/uL    Lymph # 2.1 1.0 - 4.8 K/uL    Mono # 0.7 0.3 - 1.0 K/uL    Eos # 0.4 0.0 - 0.5 K/uL    Baso # 0.07 0.00 - 0.20 K/uL    nRBC 0 0 /100 WBC    Gran% 59.0 38.0 - 73.0 %    Lymph% 26.8 18.0 - 48.0 %    Mono% 8.5 4.0 - 15.0 %    Eosinophil% 4.5 0.0 - 8.0 %    Basophil% 0.9 0.0 - 1.9 %    Differential Method Automated    Comprehensive metabolic panel   Result Value Ref Range    Sodium 142 136 - 145 mmol/L    Potassium 4.0 3.5 - 5.1 mmol/L    Chloride 103 95 - 110 mmol/L    CO2 27 23 - 29 mmol/L    Glucose 103 70 - 110 mg/dL    BUN, Bld 31 (H) 8 - 23 mg/dL    Creatinine 1.6 (H) 0.5 - 1.4 mg/dL    Calcium 10.2 8.7 - 10.5 mg/dL    Total Protein 7.9 6.0 - 8.4 g/dL    Albumin 4.3 3.5 - 5.2 g/dL    Total Bilirubin 0.8 0.1 - 1.0 mg/dL    Alkaline Phosphatase 80 55 - 135 U/L    AST 20 10 - 40 U/L    ALT 16 10 - 44 U/L    Anion Gap 12 8 - 16 mmol/L    eGFR if African American 33 (A) >60 mL/min/1.73 m^2    eGFR if non African American 29 (A) >60 mL/min/1.73 m^2   Brain natriuretic peptide   Result Value Ref Range     (H) 0 - 99 pg/mL   Troponin I   Result Value Ref Range    Troponin I 0.038 (H) 0.000 - 0.026 ng/mL   D dimer, quantitative   Result Value Ref Range    D-Dimer 0.79 (H) <0.50 mg/L FEU     *Note: Due to a large number of results and/or encounters for the requested time period, some results have not been displayed. A complete set of results can be found in Results Review.     Imaging Results:  Imaging Results          NM Lung Scan Ventilation Perfusion (Final result)  Result time 02/19/20 12:30:41    Final result by Darell MADRID  Shaina SEGOVIA MD (02/19/20 12:30:41)                 Impression:      1.  Low probability for pulmonary emboli.  No ventilation/perfusion mismatch.      Electronically signed by: Darell Merritt MD  Date:    02/19/2020  Time:    12:30             Narrative:    EXAMINATION:  NM LUNG VENTILATION AND PERFUSION IMAGING    CLINICAL HISTORY:  Chest pain, acute, PE suspected, intermed prob, positive D-dimer;    FINDINGS:  The ventilation portion of the scan was performed utilizing 1 millicurie of technetium 99 M DTPA.  The perfusion portion of the scan was performed utilizing 6 millicuries of technetium 99 M DTPA.    The ventilation and perfusion scans are similar.  There is no ventilation/perfusion mismatch.  Probability for pulmonary emboli is felt to be extremely low.                               X-Ray Chest AP Portable (Final result)  Result time 02/19/20 09:29:39    Final result by Darell Calderon MD (02/19/20 09:29:39)                 Impression:      No acute process seen.      Electronically signed by: Darell Calderon MD  Date:    02/19/2020  Time:    09:29             Narrative:    EXAMINATION:  XR CHEST AP PORTABLE    CLINICAL HISTORY:  Left chest wall pain;    FINDINGS:  Single view of the chest.  Aorta demonstrates atherosclerotic disease.    Cardiac silhouette is enlarged but stable.  The lungs demonstrate no evidence of active disease.  No evidence of pleural effusion or pneumothorax.  Bones demonstrate degenerative changes.  No displaced rib fractures are seen.                               The EKG was ordered, reviewed, and independently interpreted by the ED provider.  Interpretation time: 08:36  Rate: 66 BPM  Rhythm: normal sinus rhythm  Interpretation: No acute ST changes. No STEMI.           The Emergency Provider reviewed the vital signs and test results, which are outlined above.    ED Discussion     12:38 PM: Reassessed pt at this time. Discussed with pt all pertinent ED information and results.  Discussed pt dx and plan of tx. Gave pt all f/u and return to the ED instructions. All questions and concerns were addressed at this time. Pt expresses understanding of information and instructions, and is comfortable with plan to discharge. Pt is stable for discharge.    I discussed with patient and/or family/caretaker that evaluation in the ED does not suggest any emergent or life threatening medical conditions requiring immediate intervention beyond what was provided in the ED, and I believe patient is safe for discharge.  Regardless, an unremarkable evaluation in the ED does not preclude the development or presence of a serious of life threatening condition. As such, patient was instructed to return immediately for any worsening or change in current symptoms.    ED Course as of Feb 19 1943   Wed Feb 19, 2020 1940 Troponin at patients baseline. D-dimer elevated, but V/Q is low probability for PE. BNP not elevated. Creatinine is only mildly elevated from level 2 days ago and does not relate to her chief complaint of left axillary/chest wall pain. I discussed her arterial studies performed with the patient that occurred several days ago, and recommended further cardiology follow up. She I stable for discharge, and will f/u with her cardiologist.     [BA]   1941 Sodium: 142 [BA]   1941 Sodium: 142 [BA]   1941 Sodium: 142 [BA]   1941 Sodium: 142 [BA]      ED Course User Index  [BA] Jose Manuel Green MD       ED Medication(s):  Medications   acetaminophen tablet 1,000 mg (1,000 mg Oral Given 2/19/20 1316)     Follow-up Information     Samy MD Jonathan. Schedule an appointment as soon as possible for a visit in 1 day.    Specialties:  Cardiology, Cardiovascular Disease  Why:  For re-evaluation and further treatment  Contact information:  [id number]  [place] [address]  [phone]             [facility]. Go today.    Specialty:  Emergency Medicine  Why:  If symptoms worsen, For re-evaluation  and further treatment  Contact information:  [id number] [facility] [address] [phone]                Discharge Medication List as of 2/19/2020 12:41 PM            Medical Decision Making    Medical Decision Making:   Clinical Tests:   Lab Tests: Ordered and Reviewed  Radiological Study: Ordered and Reviewed  Medical Tests: Ordered and Reviewed           Scribe Attestation:   Scribe #1: I performed the above scribed service and the documentation accurately describes the services I performed. I attest to the accuracy of the note.    Attending:   Physician Attestation Statement for Scribe #1: I, Jose Manuel Green MD, personally performed the services described in this documentation, as scribed by Daniel Manzano, in my presence, and it is both accurate and complete.          Clinical Impression       ICD-10-CM ICD-9-CM   1. Left-sided chest wall pain R07.89 786.52       Disposition:   Disposition: Discharged  Condition: Stable         Jose Manuel Green MD  02/19/20 1944

## 2020-02-19 NOTE — ED NOTES
Notified Dr. Green that patient has complaints of being starved and dehydrated. Patient states she has had nothing to eat or drink for 8 hours and would like something. Patient would like to know which cardiologist is on-call and would like to talk to them. Dr. Green notified.

## 2020-02-20 NOTE — PROGRESS NOTES
Outpatient Care Management  Plan of Care Follow Up Visit    Patient: Shirley Metz  MRN: [id number]  Date of Service: 02/13/2020  Completed by: Shirley Ryan RN  Referral Date: 01/08/2020  Program: Case Management (High Risk)    Reason for Visit   Patient presents with    Update Plan Of Care       Brief Summary: Phone contact with pt this AM. Reviewed that she has been treated in the ED X 2 with an overnite hospital stay, since this CM's last contact and has recently had a lot of cardiac symptoms including HTN and a fib and most recently uncontrolled pain to her L axilla. She reports she is taking all meds as prescribed and is without any symptoms causing concern this morning. When upcoming appts were reviewed she advised she plans to follow up with Dr Castillo for cardio, so advised she needs to cancel upcoming appt with Dr Ramirez in March. Also advised Dr Castillo's staff attempted to contact her on 2/17/20 to review results of carotid US and encouraged her to call his staff to get these results. Discussed a fib symptoms and importance of med compliance and prompt treatment if/when symptoms occur. She advised she was given meds while admitted recently when in a fib that led to convert back to NSR and she has not felt symptoms since then. Discussed CKD and importance of med and diet compliance to help control. She questioned CM about night shade vegetables and need to avoid these for CKD, but eat them for heart disease. Encouraged her to follow a well balanced diet of lean meats, vegetables and fruits in moderation with protein portions of 3-4 oz per meal and her frequent provider appts with lab checks can monitor for any abnormals in her blood chemistry that can be addressed. Discussed [employer] programs and she advised she thinks she is eligible for Chronic Conditions Meal Program, but advised it is not available to [place] residents this year. Advised she can get [employer] meals delivered related to  recent over nite stay on 2/15/20 and she advised she will call about this. Advised this CM will follow up in two weeks and will plan to d/c from OPCM at that time if no new problems.     Patient Summary     Involvement of Care:  Do I have permission to speak with other family members about your care?    Yes, son  Patient Reported Labs & Vitals:  1.  Any Patient Reported Labs & Vitals?   No  2.  Patient Reported Blood Pressure:     3.  Patient Reported Pulse:     4.  Patient Reported Weight (Kg):     5.  Patient Reported Blood Glucose (mg/dl):       Medical History:  Reviewed medical history with patient and/or caregiver    Social History:  Reviewed social history with patient and/or caregiver    Clinical Assessment     Reviewed and provided basic information on available community resources for mental health, transportation, wellness resources, and palliative care programs with patient and/or caregiver.    Complex Care Plan     Care plan was discussed and completed today with input from patient and/or caregiver.    Goals      Patient/caregiver will have an action plan in place to manage and prevent complications of a fib prior to discharge from OPCM. - Priority: Low      Overall Time to Completion  2 months from 01/09/2020     OPCM Identified Patient Needs:  Advanced Care Planning:  No  Nutrition:  no  Housing:  no  Medication Adherence:  No  Lab Adherence:  no  Cognitive/Behavioral Health:  no  Communication:  no  Health Literacy:  no  Equipment/Supplies/Services:  no  Culural/Sikhism Beliefs:  no  PHQ:  No       OPCM Identified Disease Education Needs:  Hypertension:  Pos - Care Plan Created  Chronic Kidney Disease:  Neg         Short Term Goals  Patient/caregiver will verbalize 2 signs and symptoms of A-Fib within 1 month.   Interventions   · Recognize and provide educational material (ANTONIO).     Status  · Partially met - 2/20/20 -       Patient/caregiver will verbalize 2 ways of preventing complications due  to disease process within 1 month.  Interventions   · Encourage compliance with Physician follow-ups.  · Encourage Dietary Compliance.  · Encourage Exercise.  · Encourage Medication Compliance.  · Recognize and provide educational material (ANTONIO).     Status  · Partially met -     Patient/Caregiver agrees to receive educational literature about a fib by 1/23/20.  Patient/Caregiver agrees to OPCM follow up within 2 weeks to assess progress to goal.          Clinical Reference Documents Added to Patient Instructions       Document    ATRIAL FIBRILLATION, UNDERSTANDING (ENGLISH)    BALANCING CALCIUM AND PHOSPHORUS, KIDNEY DISEASE (ENGLISH)    CHOOSING THE RIGHT PROTEIN FOR YOUR BODY, KIDNEY DISEASE (ENGLISH)    CHRONIC KIDNEY DISEASE, DIET FOR (ENGLISH)    HEART-HEALTHY FOOD, EATING: USING THE DASH PLAN (ENGLISH)             Patient/caregiver will have an action plan in place to manage and prevent complications of CKD prior to discharge from OPCM. - Priority: Med      Overall Time to Completion  2 months from 01/09/2020     OPCM Identified Patient Needs:  Advanced Care Planning:  No  Nutrition:  no  Housing:  no  Medication Adherence:  No  Lab Adherence:  no  Cognitive/Behavioral Health:  no  Communication:  no  Health Literacy:  no  Equipment/Supplies/Services:  no  Culural/Evangelical Beliefs:  no  PHQ:  No       OPCM Identified Disease Education Needs:  Hypertension:  Pos - Care Plan Created  Chronic Kidney Disease:  Neg          Short Term Goals  Patient/caregiver will verbalize 2 signs and symptoms of Kidney Disease within 1 month.   Interventions   · Recognize and provide educational material (ANTONIO).     Status  · Partially met - 2/6/20 -       Patient/caregiver will verbalize 2 ways of preventing complications due to disease process within 1 month.  Interventions   · Encourage compliance with Physician follow-ups.  · Encourage Dietary Compliance.  · Encourage Exercise.  · Encourage Medication  Compliance.  · Recognize and provide educational material (ANTONIO).     Status  · Partially met    Patient/Caregiver agrees to receive educational literature related to CKD by 1/23/20.  Patient/Caregiver agrees to OPCM follow up within 2 weeks to assess progress to goal.            Clinical Reference Documents Added to Patient Instructions       Document    BALANCING CALCIUM AND PHOSPHORUS, KIDNEY DISEASE (ENGLISH)    CHOOSING THE RIGHT PROTEIN FOR YOUR BODY, KIDNEY DISEASE (ENGLISH)    CHRONIC KIDNEY DISEASE, DIET FOR (ENGLISH)    HEART-HEALTHY FOOD, EATING: USING THE DASH PLAN (ENGLISH)                  Patient Instructions     Instructions were provided via the [facility] patient resources and are available for the patient to view on the patient portal.    Next Steps: Review CKD symptoms and current diet. D/C from OPCM if no new problems/concerns. Shirley Ryan RN    Follow up in about 1 week (around 2/20/2020).      Todays OPCM Self-Management Care Plan was developed with the patients/caregivers input and was based on identified barriers from todays assessment.  Goals were written today with the patient/caregiver and the patient has agreed to work towards these goals to improve his/her overall well-being. Patient verbalized understanding of the care plan, goals, and all of today's instructions. Encouraged patient/caregiver to communicate with his/her physician and health care team about health conditions and the treatment plan.  Provided my contact information today and encouraged patient/caregiver to call me with any questions as needed.

## 2020-02-24 ENCOUNTER — OFFICE VISIT (OUTPATIENT)
Dept: CARDIOLOGY | Facility: CLINIC | Age: 85
End: 2020-02-24
Payer: MEDICARE

## 2020-02-24 VITALS
WEIGHT: 170.63 LBS | BODY MASS INDEX: 29.29 KG/M2 | SYSTOLIC BLOOD PRESSURE: 166 MMHG | OXYGEN SATURATION: 96 % | HEART RATE: 65 BPM | DIASTOLIC BLOOD PRESSURE: 77 MMHG

## 2020-02-24 DIAGNOSIS — I50.30 HEART FAILURE WITH PRESERVED LEFT VENTRICULAR FUNCTION (HFPEF): ICD-10-CM

## 2020-02-24 DIAGNOSIS — I48.0 PAROXYSMAL ATRIAL FIBRILLATION: ICD-10-CM

## 2020-02-24 DIAGNOSIS — I10 ESSENTIAL HYPERTENSION: Primary | Chronic | ICD-10-CM

## 2020-02-24 DIAGNOSIS — I51.3 THROMBUS OF LEFT ATRIAL APPENDAGE: ICD-10-CM

## 2020-02-24 DIAGNOSIS — I70.0 CALCIFICATION OF AORTA: Chronic | ICD-10-CM

## 2020-02-24 PROCEDURE — 99214 OFFICE O/P EST MOD 30 MIN: CPT | Mod: HCNC,S$GLB,, | Performed by: INTERNAL MEDICINE

## 2020-02-24 PROCEDURE — 99214 PR OFFICE/OUTPT VISIT, EST, LEVL IV, 30-39 MIN: ICD-10-PCS | Mod: HCNC,S$GLB,, | Performed by: INTERNAL MEDICINE

## 2020-02-24 PROCEDURE — 99499 RISK ADDL DX/OHS AUDIT: ICD-10-PCS | Mod: HCNC,S$GLB,, | Performed by: INTERNAL MEDICINE

## 2020-02-24 PROCEDURE — 1101F PR PT FALLS ASSESS DOC 0-1 FALLS W/OUT INJ PAST YR: ICD-10-PCS | Mod: HCNC,CPTII,S$GLB, | Performed by: INTERNAL MEDICINE

## 2020-02-24 PROCEDURE — 1159F PR MEDICATION LIST DOCUMENTED IN MEDICAL RECORD: ICD-10-PCS | Mod: HCNC,S$GLB,, | Performed by: INTERNAL MEDICINE

## 2020-02-24 PROCEDURE — 99999 PR PBB SHADOW E&M-EST. PATIENT-LVL III: ICD-10-PCS | Mod: PBBFAC,HCNC,, | Performed by: INTERNAL MEDICINE

## 2020-02-24 PROCEDURE — 1159F MED LIST DOCD IN RCRD: CPT | Mod: HCNC,S$GLB,, | Performed by: INTERNAL MEDICINE

## 2020-02-24 PROCEDURE — 99999 PR PBB SHADOW E&M-EST. PATIENT-LVL III: CPT | Mod: PBBFAC,HCNC,, | Performed by: INTERNAL MEDICINE

## 2020-02-24 PROCEDURE — 99499 UNLISTED E&M SERVICE: CPT | Mod: HCNC,S$GLB,, | Performed by: INTERNAL MEDICINE

## 2020-02-24 PROCEDURE — 1101F PT FALLS ASSESS-DOCD LE1/YR: CPT | Mod: HCNC,CPTII,S$GLB, | Performed by: INTERNAL MEDICINE

## 2020-02-24 NOTE — PROGRESS NOTES
Subjective:   Patient ID:  Shirley Metz is a 87 y.o. female who presents for follow up of Hospital Follow Up      88 yo female. came in er visit f/u   PMH pafib, + positive SELVIN, HTN, HLD chfpEF and CKD   echo showed EF 60%. LVH   Nulcear stress test no ischemia  10/07 saw pt in the office for C/o recent weakness,  stomach upset,. Has fast pulse. Some dizziness, and Monet. ekg showed new onset afib with RVR. Increased Mwetoprolol to 100 mg bid and add cardizem and eliquis 2.5 m bid.  10/09, admitted to [facility] for chest pain. Troponin 0.035 chronic elevation. Afib with RVR  . Echo showed normal EF and mild MR> Next day, STACEY showed + SELVIN thrombus and DCCV cancelled. Continued Eliquis  12/07 repeated STACEY no SELVIN thrombus and DCCV x1 converted to sinus   LUM arterial and carotid US showed mild Dz.    went to ER due to severe left arm pain. Troponin 0.03 and lung perfusion scan negative for PE    Pt has had multiple allergies to HTN medication and multiple ER visits for uncontrolled HTN.   Pt also feels dehydration and trouble to swallow. Has lightheaded, MONET, weakness. Has constipation.   BP log showed  to 116 mmHG. Pt is satisfied her BP  And not took her clonidine for a week.  Her pulse was at 45 to 55 bpm.   Had headache and blurred vision  Today states that ok to continue Losartan 100 mg daily  Daily taking clonidine 1 mg daily  Continue Metoprolol 25 mg bid              Past Medical History:   Diagnosis Date    Anemia 11/29/2018    Anxiety 11/28/2017    Arthritis     Atrial fibrillation with RVR 10/9/2019    Back pain     Basal cell carcinoma 03/29/2018    left mid back    Chronic diastolic congestive heart failure 10/15/2019    Colon polyps     reported per patient.     Fuchs' corneal dystrophy     General anesthetics causing adverse effect in therapeutic use     woke up during surgery and gagged on ET tube    Glaucoma     Glaucoma     Heart failure  with preserved left ventricular function (HFpEF) 7/24/2018    Hyperlipidemia     Hypertension     Macular degeneration dry    Obesity     Squamous cell carcinoma 01/08/2019    left shoulder    Trouble in sleeping     Urinary tract infection        Past Surgical History:   Procedure Laterality Date    ADENOIDECTOMY  1938    BREAST BIOPSY Left     1997. benign    BREAST CYST EXCISION Left 1997 approx    CARPAL TUNNEL RELEASE Right 2012 approx    CATARACT EXTRACTION Bilateral 1994    CHOLECYSTECTOMY  1975    CORNEAL TRANSPLANT Bilateral 08/2015 8/2015 - left; Right 4/2016    EYE SURGERY      Fuch's Corneal dystrophy - had 2nd operation with Dr. Quintanilla    EYE SURGERY      Cataract wIOL    left thumb Left 2011    removed cuboid for arthritis    REVERSE TOTAL SHOULDER ARTHROPLASTY Left 8/21/2018    Procedure: ARTHROPLASTY, SHOULDER, TOTAL, REVERSE;  Surgeon: Solomon Lujan MD;  Location: [facility] OR;  Service: Orthopedics;  Laterality: Left;  WITH BICEP TENODESIS    SKIN CANCER EXCISION Left 2017    BCC removal by Dr. Valadez    SLT- OS (aka TRABECULOPLASTY) Left 05/11/2011    repeat 3/14/12    TENOTOMY Left 8/21/2018    Procedure: TENOTOMY;  Surgeon: Solomon Lujan MD;  Location: [facility] OR;  Service: Orthopedics;  Laterality: Left;    TONSILLECTOMY  1938    TREATMENT OF CARDIAC ARRHYTHMIA N/A 10/10/2019    Procedure: CARDIOVERSION;  Surgeon: Pete Durán MD;  Location: [facility] CATH LAB;  Service: Cardiology;  Laterality: N/A;    TREATMENT OF CARDIAC ARRHYTHMIA N/A 12/6/2019    Procedure: CARDIOVERSION;  Surgeon: Samy Castillo MD;  Location: [facility] CATH LAB;  Service: Cardiology;  Laterality: N/A;       Social History     Tobacco Use    Smoking status: Never Smoker    Smokeless tobacco: Never Used    Tobacco comment: history of passive smoking from her ex-   Substance Use Topics    Alcohol use: Not Currently     Alcohol/week: 2.0 standard drinks     Types: 2 Standard drinks or equivalent  per week     Frequency: Monthly or less     Drinks per session: 1 or 2     Binge frequency: Never     Comment: occasional     Drug use: No       Family History   Problem Relation Age of Onset    Heart disease Mother     Hypertension Mother     Cancer Father 88        sarcoma( ?[place]'s ) on leg    Deep vein thrombosis Neg Hx     Ovarian cancer Neg Hx     Breast cancer Neg Hx     Kidney disease Neg Hx     Strabismus Neg Hx     Retinal detachment Neg Hx     Macular degeneration Neg Hx     Glaucoma Neg Hx     Blindness Neg Hx     Amblyopia Neg Hx     Eczema Neg Hx     Lupus Neg Hx     Melanoma Neg Hx     Psoriasis Neg Hx     Diabetes Neg Hx     Stroke Neg Hx     Mental retardation Neg Hx     Mental illness Neg Hx     Hyperlipidemia Neg Hx     COPD Neg Hx     Asthma Neg Hx     Depression Neg Hx     Alcohol abuse Neg Hx     Drug abuse Neg Hx          Review of Systems   Constitution: Positive for malaise/fatigue and weight loss. Negative for decreased appetite, diaphoresis, fever and night sweats.   HENT: Negative for nosebleeds.    Eyes: Negative for blurred vision and double vision.   Cardiovascular: Positive for palpitations. Negative for chest pain, claudication, irregular heartbeat, leg swelling, near-syncope, orthopnea, paroxysmal nocturnal dyspnea and syncope.   Respiratory: Negative for cough, shortness of breath, sleep disturbances due to breathing, snoring, sputum production and wheezing.    Endocrine: Negative for cold intolerance and polyuria.   Hematologic/Lymphatic: Does not bruise/bleed easily.   Skin: Negative for rash.   Musculoskeletal: Negative for back pain, falls, joint pain, joint swelling and neck pain.   Gastrointestinal: Positive for abdominal pain. Negative for heartburn, nausea and vomiting.   Genitourinary: Negative for dysuria, frequency and hematuria.   Neurological: Negative for difficulty with concentration, dizziness, focal weakness, headaches,  light-headedness, numbness, seizures and weakness.   Psychiatric/Behavioral: Negative for depression, memory loss and substance abuse. The patient does not have insomnia.    Allergic/Immunologic: Negative for HIV exposure and hives.       Objective:   Physical Exam   Constitutional: She is oriented to person, place, and time. She appears well-nourished.   HENT:   Head: Normocephalic.   Eyes: Pupils are equal, round, and reactive to light.   Neck: Normal carotid pulses and no JVD present. Carotid bruit is not present. No thyromegaly present.   Cardiovascular: Normal rate, regular rhythm, normal heart sounds and normal pulses.  No extrasystoles are present. PMI is not displaced. Exam reveals no gallop and no S3.   No murmur heard.  Pulmonary/Chest: Breath sounds normal. No stridor. No respiratory distress.   Abdominal: Soft. Bowel sounds are normal. There is no tenderness. There is no rebound.   Musculoskeletal: Normal range of motion.   Neurological: She is alert and oriented to person, place, and time.   Skin: Skin is intact. No rash noted.   Psychiatric: Her behavior is normal.       Lab Results   Component Value Date    CHOL 233 (H) 08/01/2019    CHOL 180 03/16/2018    CHOL 181 02/19/2018     Lab Results   Component Value Date    HDL 49 08/01/2019    HDL 43 03/16/2018    HDL 45 02/19/2018     Lab Results   Component Value Date    LDLCALC 155.0 08/01/2019    LDLCALC 117.6 03/16/2018    LDLCALC 118.2 02/19/2018     Lab Results   Component Value Date    TRIG 145 08/01/2019    TRIG 97 03/16/2018    TRIG 89 02/19/2018     Lab Results   Component Value Date    CHOLHDL 21.0 08/01/2019    CHOLHDL 23.9 03/16/2018    CHOLHDL 24.9 02/19/2018       Chemistry        Component Value Date/Time     02/19/2020 0855    K 4.0 02/19/2020 0855     02/19/2020 0855    CO2 27 02/19/2020 0855    BUN 31 (H) 02/19/2020 0855    CREATININE 1.6 (H) 02/19/2020 0855     02/19/2020 0855        Component Value Date/Time     CALCIUM 10.2 02/19/2020 0855    ALKPHOS 80 02/19/2020 0855    AST 20 02/19/2020 0855    ALT 16 02/19/2020 0855    BILITOT 0.8 02/19/2020 0855    ESTGFRAFRICA 33 (A) 02/19/2020 0855    EGFRNONAA 29 (A) 02/19/2020 0855          Lab Results   Component Value Date    HGBA1C 6.4 (H) 01/08/2020     Lab Results   Component Value Date    TSH 0.849 10/09/2019     Lab Results   Component Value Date    INR 1.0 02/12/2020    INR 1.0 01/25/2018    INR 1.0 01/01/2017     Lab Results   Component Value Date    WBC 7.99 02/19/2020    HGB 14.0 02/19/2020    HCT 43.1 02/19/2020    MCV 98 02/19/2020     02/19/2020     BMP  Sodium   Date Value Ref Range Status   02/19/2020 142 136 - 145 mmol/L Final     Potassium   Date Value Ref Range Status   02/19/2020 4.0 3.5 - 5.1 mmol/L Final     Chloride   Date Value Ref Range Status   02/19/2020 103 95 - 110 mmol/L Final     CO2   Date Value Ref Range Status   02/19/2020 27 23 - 29 mmol/L Final     BUN, Bld   Date Value Ref Range Status   02/19/2020 31 (H) 8 - 23 mg/dL Final     Creatinine   Date Value Ref Range Status   02/19/2020 1.6 (H) 0.5 - 1.4 mg/dL Final     Calcium   Date Value Ref Range Status   02/19/2020 10.2 8.7 - 10.5 mg/dL Final     Anion Gap   Date Value Ref Range Status   02/19/2020 12 8 - 16 mmol/L Final     eGFR if    Date Value Ref Range Status   02/19/2020 33 (A) >60 mL/min/1.73 m^2 Final     eGFR if non    Date Value Ref Range Status   02/19/2020 29 (A) >60 mL/min/1.73 m^2 Final     Comment:     Calculation used to obtain the estimated glomerular filtration  rate (eGFR) is the CKD-EPI equation.        BNP  @LABRCNTIP(BNP,BNPTRIAGEBLO)@  @LABRCNTIP(troponini)@  Estimated Creatinine Clearance: 24.9 mL/min (A) (based on SCr of 1.6 mg/dL (H)).  No results found in the last 24 hours.  No results found in the last 24 hours.  No results found in the last 24 hours.    Assessment:      1. Essential hypertension    2. Heart failure with preserved  left ventricular function (HFpEF)    3. Calcification of aorta    4. Paroxysmal atrial fibrillation    5. Thrombus of left atrial appendage      Labile HTN. Better controlled BP and pt is satisfied of BP control  PAF on sinus no active bleeding    Plan:   Advise Clonidine 0.1 mg prn if SBP > 170 mmHG  Continue Metoprolol, losartan and HCTZ  Continue Eliquis 2.5 mg bid and Amiodarone 200 mg daily  Counseled DASH  Recommend heart-healthy diet, weight control and regular exercise.  Shannen. Risk modification.   RTC in 3 months    I have reviewed all pertinent labs and cardiac studies. Plans and recommendations have been formulated under my direct supervision. All questions answered and patient voiced understanding. Patient to continue current medications.

## 2020-03-02 ENCOUNTER — PATIENT MESSAGE (OUTPATIENT)
Dept: CARDIOLOGY | Facility: CLINIC | Age: 85
End: 2020-03-02

## 2020-03-02 ENCOUNTER — PATIENT MESSAGE (OUTPATIENT)
Dept: INTERNAL MEDICINE | Facility: CLINIC | Age: 85
End: 2020-03-02

## 2020-03-05 ENCOUNTER — OUTPATIENT CASE MANAGEMENT (OUTPATIENT)
Dept: ADMINISTRATIVE | Facility: OTHER | Age: 85
End: 2020-03-05

## 2020-03-05 NOTE — PROGRESS NOTES
Outpatient Care Management  Plan of Care Follow Up Visit    Patient: Shirley Metz  MRN: [id number]  Date of Service: 03/05/2020  Completed by: Shirley Ryan RN  Referral Date: 01/08/2020  Program: Case Management (High Risk)    Reason for Visit   Patient presents with    Update Plan Of Care       Brief Summary: Phone contact made with pt, but she is out to eat at a restaurant and requests to call this CM back this afternoon.   Addendum @ 3:15 PM on 3/5/20 - Rec'd return call from pt. She advised she has not had chest pain or a fib symptoms since this CM's last contact in Feb. She has had one BP that she reported to her PCP and cardio provider that she felt was too low, but rec'd a response from Dr Castillo yesterday to continue her current regimen as long as she does not have any discomfort. She voiced understanding of this. Upcoming appts were reviewed and she is aware of those scheduled later this month with plans to attend. Also discussed [employer] programs and she is accessing all that she is eligible for. Advised this CM will d/c her from OPCM today related to all goals met and encouraged her to follow up with the appropriate provider for any symptoms causing concern in the future. She verbalized understanding.     Patient Summary     Involvement of Care:  Do I have permission to speak with other family members about your care?   Yes, son    Patient Reported Labs & Vitals:  1.  Any Patient Reported Labs & Vitals?    No  2.  Patient Reported Blood Pressure:     3.  Patient Reported Pulse:     4.  Patient Reported Weight (Kg):     5.  Patient Reported Blood Glucose (mg/dl):       Medical History:  Reviewed medical history with patient and/or caregiver    Social History:  Reviewed social history with patient and/or caregiver    Clinical Assessment     Reviewed and provided basic information on available community resources for mental health, transportation, wellness resources, and palliative care programs  with patient and/or caregiver.    Complex Care Plan     Care plan was discussed and completed today with input from patient and/or caregiver.    Goals      Patient/caregiver will have an action plan in place to manage and prevent complications of CKD prior to discharge from OPCM. - Priority: Med      Overall Time to Completion  2 months from 01/09/2020     OPCM Identified Patient Needs:  Advanced Care Planning:  No  Nutrition:  no  Housing:  no  Medication Adherence:  No  Lab Adherence:  no  Cognitive/Behavioral Health:  no  Communication:  no  Health Literacy:  no  Equipment/Supplies/Services:  no  Culural/Holiness Beliefs:  no  PHQ:  No       OPCM Identified Disease Education Needs:  Hypertension:  Pos - Care Plan Created  Chronic Kidney Disease:  Neg          Short Term Goals  Patient/caregiver will verbalize 2 signs and symptoms of Kidney Disease within 1 month.   Interventions   · Recognize and provide educational material (ANTONIO).     Status  · Partially met - 2/20/20 - Discussed      Patient/caregiver will verbalize 2 ways of preventing complications due to disease process within 1 month.  Interventions   · Encourage compliance with Physician follow-ups.  · Encourage Dietary Compliance.  · Encourage Exercise.  · Encourage Medication Compliance.  · Recognize and provide educational material (ANTONIO).     Status  · Partially met - 2/20/20 - Discussed    Patient/Caregiver agrees to receive educational literature related to CKD by 1/23/20.  Patient/Caregiver agrees to OPCM follow up within 2 weeks to assess progress to goal.            Clinical Reference Documents Added to Patient Instructions       Document    BALANCING CALCIUM AND PHOSPHORUS, KIDNEY DISEASE (ENGLISH)    CHOOSING THE RIGHT PROTEIN FOR YOUR BODY, KIDNEY DISEASE (ENGLISH)    CHRONIC KIDNEY DISEASE, DIET FOR (ENGLISH)    HEART-HEALTHY FOOD, EATING: USING THE DASH PLAN (ENGLISH)                  Patient Instructions     Instructions were provided  via the [employer] patient resources and are available for the patient to view on the patient portal.    Next Steps: D/C from OPCM related to goals met. Shirley Ryan RN    No follow-ups on file.      Todays OPCM Self-Management Care Plan was developed with the patients/caregivers input and was based on identified barriers from todays assessment.  Goals were written today with the patient/caregiver and the patient has agreed to work towards these goals to improve his/her overall well-being. Patient verbalized understanding of the care plan, goals, and all of today's instructions. Encouraged patient/caregiver to communicate with his/her physician and health care team about health conditions and the treatment plan.  Provided my contact information today and encouraged patient/caregiver to call me with any questions as needed.

## 2020-03-09 ENCOUNTER — PATIENT MESSAGE (OUTPATIENT)
Dept: CARDIOLOGY | Facility: CLINIC | Age: 85
End: 2020-03-09

## 2020-03-13 RX ORDER — ISOSORBIDE MONONITRATE 60 MG/1
60 TABLET, EXTENDED RELEASE ORAL DAILY
Qty: 30 TABLET | Refills: 11 | Status: SHIPPED | OUTPATIENT
Start: 2020-03-13 | End: 2020-08-20 | Stop reason: SDUPTHER

## 2020-03-13 NOTE — TELEPHONE ENCOUNTER
----- Message from Josie Newman sent at 3/13/2020  9:44 AM CDT -----  [facility]  Requesting call back regarding getting refill on rx amiodarone and isosorbide. States they have sent a refill request over as well. Please call back at [phone].    Thanks,  Josie Newman

## 2020-03-22 ENCOUNTER — PATIENT MESSAGE (OUTPATIENT)
Dept: CARDIOLOGY | Facility: CLINIC | Age: 85
End: 2020-03-22

## 2020-03-23 ENCOUNTER — TELEPHONE (OUTPATIENT)
Dept: CARDIOLOGY | Facility: CLINIC | Age: 85
End: 2020-03-23

## 2020-03-23 RX ORDER — METOPROLOL TARTRATE 25 MG/1
12.5 TABLET, FILM COATED ORAL 2 TIMES DAILY
Qty: 30 TABLET | Refills: 11
Start: 2020-03-23 | End: 2020-12-04 | Stop reason: SDUPTHER

## 2020-03-23 RX ORDER — AMIODARONE HYDROCHLORIDE 200 MG/1
100 TABLET ORAL DAILY
Qty: 15 TABLET | Refills: 11
Start: 2020-03-23 | End: 2020-05-18 | Stop reason: SDUPTHER

## 2020-03-23 NOTE — TELEPHONE ENCOUNTER
Contacted pt and she would like to keep her scheduled appointment for Friday @11:40.        ----- Message from Leida Rowe sent at 3/23/2020  3:17 PM CDT -----  Type:  Patient Returning Call    Who Called: Pt  Shirley Rg  Who Left Message for Patient:  Dr Castillo's office  Does the patient know what this is regarding?:   Would the patient rather a call back or a response via [facility]?    Call back  Best Call Back Number:  [phone]  Additional Information:  States she is returning  Your call//please call again//thanks/lh

## 2020-03-23 NOTE — TELEPHONE ENCOUNTER
Attempted to contacted pt about Decrease amiodarone to 1/2 pill once a day and Lopressor 1/2 pill twice a day lvm for pt to call back.

## 2020-03-26 ENCOUNTER — PATIENT MESSAGE (OUTPATIENT)
Dept: CARDIOLOGY | Facility: CLINIC | Age: 85
End: 2020-03-26

## 2020-03-26 ENCOUNTER — PATIENT OUTREACH (OUTPATIENT)
Dept: ADMINISTRATIVE | Facility: OTHER | Age: 85
End: 2020-03-26

## 2020-03-27 ENCOUNTER — PATIENT MESSAGE (OUTPATIENT)
Dept: CARDIOLOGY | Facility: CLINIC | Age: 85
End: 2020-03-27

## 2020-04-02 ENCOUNTER — PATIENT MESSAGE (OUTPATIENT)
Dept: UROLOGY | Facility: CLINIC | Age: 85
End: 2020-04-02

## 2020-04-15 RX ORDER — APIXABAN 2.5 MG/1
TABLET, FILM COATED ORAL
Qty: 60 TABLET | Refills: 5 | Status: SHIPPED | OUTPATIENT
Start: 2020-04-15 | End: 2020-08-20 | Stop reason: SDUPTHER

## 2020-04-25 ENCOUNTER — PATIENT MESSAGE (OUTPATIENT)
Dept: OPHTHALMOLOGY | Facility: CLINIC | Age: 85
End: 2020-04-25

## 2020-05-05 ENCOUNTER — PATIENT MESSAGE (OUTPATIENT)
Dept: OPHTHALMOLOGY | Facility: CLINIC | Age: 85
End: 2020-05-05

## 2020-05-07 ENCOUNTER — OFFICE VISIT (OUTPATIENT)
Dept: INTERNAL MEDICINE | Facility: CLINIC | Age: 85
End: 2020-05-07
Payer: MEDICARE

## 2020-05-07 VITALS — SYSTOLIC BLOOD PRESSURE: 141 MMHG | HEART RATE: 51 BPM | DIASTOLIC BLOOD PRESSURE: 70 MMHG

## 2020-05-07 DIAGNOSIS — I10 HYPERTENSION, ESSENTIAL: Primary | ICD-10-CM

## 2020-05-07 DIAGNOSIS — R21 RASH OF FACE: ICD-10-CM

## 2020-05-07 PROCEDURE — 99443 PR PHYSICIAN TELEPHONE EVALUATION 21-30 MIN: ICD-10-PCS | Mod: HCNC,95,, | Performed by: FAMILY MEDICINE

## 2020-05-07 PROCEDURE — 99443 PR PHYSICIAN TELEPHONE EVALUATION 21-30 MIN: CPT | Mod: HCNC,95,, | Performed by: FAMILY MEDICINE

## 2020-05-07 RX ORDER — VITAMIN B COMPLEX
1 CAPSULE ORAL DAILY
COMMUNITY
End: 2024-02-02

## 2020-05-07 RX ORDER — LOSARTAN POTASSIUM 50 MG/1
50 TABLET ORAL 2 TIMES DAILY
Qty: 180 TABLET | Refills: 0
Start: 2020-05-07 | End: 2020-08-14

## 2020-05-07 NOTE — PATIENT INSTRUCTIONS
Increase the losartan from 50 mg once daily to twice daily - AM and PM. Continue with BP checks.  Please schedule 3 month recheck for approx 8/7/2020

## 2020-05-07 NOTE — PROGRESS NOTES
Subjective:       Patient ID: Shirley Metz is a 87 y.o. female.    Chief Complaint: Hypertension    Audio Only Telehealth Visit     The patient location is: home / [place]  The chief complaint leading to consultation is: HTN3  Visit type: Virtual visit with audio only (telephone)  Time In/Out: 11:21 - 12:03  The reason for the audio only service rather than synchronous audio and video virtual visit was related to technical difficulties or patient preference/necessity.     Each patient to whom I provide medical services by telemedicine is:  (1) informed of the relationship between the physician and patient and the respective role of any other health care provider with respect to management of the patient; and (2) notified that they may decline to receive medical services by telemedicine and may withdraw from such care at any time. Patient verbally consented to receive this service via voice-only telephone call.    This service was not originating from a related E/M service provided within the previous 7 days nor will  to an E/M service or procedure within the next 24 hours or my soonest available appointment.  Prevailing standard of care was able to be met in this audio-only visit.      HPI:  Here for 3 month recheck hypertension.  She was to recheck with me in about 4 weeks but COVID-19 intervened.  This is an audio only visit.  She states her walking is steadier, better - not SOB as much. Doing more. Not sure why better.   Reviewed her BP readings.    In addition, she wanted to address: having trouble with  - right handed - but both having trouble gripping. No numbness. No problem with strength, grasp - just fine movement perhaps. Can't  small items like paper clip. Notes tips of digits red and indent with pressure. States not swollen.  She mentions the lip swelling that occurred over the winter - it resolved. Now c/o blue spots on her lips now - was one, now 4. States there was just one  when seen by derm this winter.  Also c/o swelling itchy bumps to forehead. States she showed it to me and to derm - we could not appreciate anything. Nothing OTC or the rx given by derm has helped the bumps resolve.  States itchiness off and on - topicals do work for that. 1/2 inch in diam circles x 5. Feels the bone is elevated underneath.  Reviewed derm note - states no evidence of any changes to skin or bone beneath are appreciated. I could find no mention in my notes of similar c/o.      She does her own BP checks.  BP Readings from Last 3 Encounters:  02/24/20 : (!) 166/77  02/19/20 : (!) 164/78  02/17/20 : 130/64  Hypertension Medications  - reviewed all meds - she is only taking one 50mg losartan daily.  Reviewed her numbers - AMs are high 150s-160s. Yesterday /75, noon 116/56, p.m. 132/57.  Heart rate runs low 50s.  She has a 2 week average for her a.m., noon and p.m. BP's.  They are quite similar to yesterday's readings.  A.m. 157/73, noon 128/61, p.m. 145/68.     hydroCHLOROthiazide (HYDRODIURIL) 25 MG tablet Take 1 tablet (25 mg total) by mouth once daily.    isosorbide mononitrate (IMDUR) 60 MG 24 hr tablet Take 1 tablet (60 mg total) by mouth once daily.    losartan (COZAAR) 50 MG tablet Take 2 tablets (100 mg total) by mouth once daily.    metoprolol tartrate (LOPRESSOR) 25 MG tablet Take 0.5 tablets (12.5 mg total) by mouth 2 (two) times daily.          Review of Systems   Skin: Positive for rash.       Objective:      Physical Exam   Constitutional: She is oriented to person, place, and time. No distress.   Cardiovascular: Normal rate.   Pulmonary/Chest: Effort normal. No respiratory distress.   Neurological: She is alert and oriented to person, place, and time.   Skin: Skin is warm and dry.   Psychiatric: She has a normal mood and affect. Her behavior is normal.         Assessment/Plan:     1. Hypertension, essential  losartan (COZAAR) 50 MG tablet   2. Rash of face      per pt description -  audio only visit - cannot assess    She is only taking one 50 mg losartan daily.  Her prescription is for 2 daily.  Recommend she take 50 mg BID and this may help lower her a.m. high blood pressure readings.  Recheck 3 months and as needed.  Will assess for the forehead symptoms at her next visit.  She does see Dermatology next week and will also review this with dermatology.  Consider outpatient care management.  She is a candidate.  Did not discuss today.  In the past she has declined.

## 2020-05-12 ENCOUNTER — PATIENT OUTREACH (OUTPATIENT)
Dept: ADMINISTRATIVE | Facility: OTHER | Age: 85
End: 2020-05-12

## 2020-05-12 ENCOUNTER — TELEPHONE (OUTPATIENT)
Dept: OPHTHALMOLOGY | Facility: CLINIC | Age: 85
End: 2020-05-12

## 2020-05-12 NOTE — TELEPHONE ENCOUNTER
----- Message from Arlene Murillo sent at 5/12/2020  2:24 PM CDT -----  Contact: Patient  Patient states that she has left messages before regarding a medication from another Dr, but she has not heard back please call her back at [phone]. Thank you

## 2020-05-13 ENCOUNTER — TELEPHONE (OUTPATIENT)
Dept: OPHTHALMOLOGY | Facility: CLINIC | Age: 85
End: 2020-05-13

## 2020-05-13 ENCOUNTER — OFFICE VISIT (OUTPATIENT)
Dept: DERMATOLOGY | Facility: CLINIC | Age: 85
End: 2020-05-13
Payer: MEDICARE

## 2020-05-13 DIAGNOSIS — Z12.83 SCREENING EXAM FOR SKIN CANCER: ICD-10-CM

## 2020-05-13 DIAGNOSIS — L90.5 SCAR CONDITIONS/SKIN FIBROSIS: Primary | ICD-10-CM

## 2020-05-13 DIAGNOSIS — Z12.83 SCREENING, MALIGNANT NEOPLASM, SKIN: ICD-10-CM

## 2020-05-13 DIAGNOSIS — R23.8 VENOUS LAKE: ICD-10-CM

## 2020-05-13 DIAGNOSIS — D18.00 HEMANGIOMA, UNSPECIFIED SITE: ICD-10-CM

## 2020-05-13 DIAGNOSIS — L57.8 ACTINIC ELASTOSIS: ICD-10-CM

## 2020-05-13 DIAGNOSIS — L29.9 PRURITUS: ICD-10-CM

## 2020-05-13 PROCEDURE — 99214 PR OFFICE/OUTPT VISIT, EST, LEVL IV, 30-39 MIN: ICD-10-PCS | Mod: HCNC,S$GLB,, | Performed by: DERMATOLOGY

## 2020-05-13 PROCEDURE — 1159F PR MEDICATION LIST DOCUMENTED IN MEDICAL RECORD: ICD-10-PCS | Mod: HCNC,S$GLB,, | Performed by: DERMATOLOGY

## 2020-05-13 PROCEDURE — 1159F MED LIST DOCD IN RCRD: CPT | Mod: HCNC,S$GLB,, | Performed by: DERMATOLOGY

## 2020-05-13 PROCEDURE — 99999 PR PBB SHADOW E&M-EST. PATIENT-LVL II: ICD-10-PCS | Mod: PBBFAC,HCNC,, | Performed by: DERMATOLOGY

## 2020-05-13 PROCEDURE — 1101F PT FALLS ASSESS-DOCD LE1/YR: CPT | Mod: HCNC,CPTII,S$GLB, | Performed by: DERMATOLOGY

## 2020-05-13 PROCEDURE — 99214 OFFICE O/P EST MOD 30 MIN: CPT | Mod: HCNC,S$GLB,, | Performed by: DERMATOLOGY

## 2020-05-13 PROCEDURE — 99999 PR PBB SHADOW E&M-EST. PATIENT-LVL II: CPT | Mod: PBBFAC,HCNC,, | Performed by: DERMATOLOGY

## 2020-05-13 PROCEDURE — 1101F PR PT FALLS ASSESS DOC 0-1 FALLS W/OUT INJ PAST YR: ICD-10-PCS | Mod: HCNC,CPTII,S$GLB, | Performed by: DERMATOLOGY

## 2020-05-13 NOTE — PATIENT INSTRUCTIONS
Start Sarna Anti-Itch Lotion    XEROSIS (DRY SKIN)      1. Definition    Xerosis is the term for dry skin.  We all have a natural oil coating over our skin produced by the skin oil glands.  If this oil is removed, the skin becomes dry which can lead to cracking, which can lead to inflammation.  Xerosis is usually a long-term problem that recurs often, especially in the winter.    2. Cause     Long hot baths or showers can remove our natural oil and lead to xerosis.  One should never take more than one bath or shower a day and for no longer than ten minutes.   Use of harsh soaps such as Zest, Dial, Chinese Spring, Lever and Ivory can worsen and cause xerosis.   Cold winter weather worsens xerosis because the amount of moisture contained in cold air is much less than the amount of moisture in warm air.    3. Treatment     Treatment is intended to restore the natural oil to your skin.  Keep the skin lubricated.     Do not take more than one bath or shower a day.  Use lukewarm water, not hot.  Hot water dries out the skin.     Use a gentle moisturizing soap such as Cetaphil soap, Dove, or Cetaphil Restoraderm cleanser.     When toweling dry, dont rub.  Blot the skin so there is still some water left on the skin.  You should apply a moisturizing cream to all of the skin such as Cerave cream, Cetaphil cream, Restoraderm or Eucerin Original Formula cream.   Alpha hydroxyacid lotions, i.e., AmLactin, also work very well for preventing dry skin, but may burn when used on inflamed or reddened skin.      [facility]   DERMATOLOGY  [address] [place] [address]    Dept: [phone]   Dept Fax: [phone]

## 2020-05-13 NOTE — PROGRESS NOTES
Subjective:       Patient ID:  Shirley Metz is a 87 y.o. 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 Davis.  She c/o swelling of the forehead and lips.  This is a high risk patient here to check for the development of new lesions. Denies bleeding, tender, growing, or concerning lesions.     She c/o pruritus of numerous sites of the back, shoulder blades and ears.     + floaters and double vision with anti-histamines       Review of Systems   Constitutional: Negative for fever and chills.   Gastrointestinal: Negative for nausea and vomiting.   Skin: Positive for activity-related sunscreen use. Negative for daily sunscreen use and recent sunburn.   Hematologic/Lymphatic: Does not bruise/bleed easily.        Objective:    Physical Exam   Constitutional: She appears well-developed and well-nourished. No distress.   Neurological: She is alert and oriented to person, place, and time. She is not disoriented.   Psychiatric: She has a normal mood and affect.   Skin:   Areas Examined (abnormalities noted in diagram):   Head / Face Inspection Performed  Neck Inspection Performed  Chest / Axilla Inspection Performed  Abdomen Inspection Performed  Back Inspection Performed  RUE Inspected  LUE Inspection Performed  Nails and Digits Inspection Performed                       Diagram Legend     Erythematous scaling macule/papule c/w actinic keratosis       Vascular papule c/w angioma      Pigmented verrucoid papule/plaque c/w seborrheic keratosis      Yellow umbilicated papule c/w sebaceous hyperplasia      Irregularly shaped tan macule c/w lentigo     1-2 mm smooth white papules consistent with Milia      Movable subcutaneous cyst with punctum c/w  epidermal inclusion cyst      Subcutaneous movable cyst c/w pilar cyst      Firm pink to brown papule c/w dermatofibroma      Pedunculated fleshy papule(s) c/w skin tag(s)      Evenly pigmented macule c/w junctional nevus     Mildly variegated pigmented, slightly irregular-bordered macule c/w mildly atypical nevus      Flesh colored to evenly pigmented papule c/w intradermal nevus       Pink pearly papule/plaque c/w basal cell carcinoma      Erythematous hyperkeratotic cursted plaque c/w SCC      Surgical scar with no sign of skin cancer recurrence      Open and closed comedones      Inflammatory papules and pustules      Verrucoid papule consistent consistent with wart     Erythematous eczematous patches and plaques     Dystrophic onycholytic nail with subungual debris c/w onychomycosis     Umbilicated papule    Erythematous-base heme-crusted tan verrucoid plaque consistent with inflamed seborrheic keratosis     Erythematous Silvery Scaling Plaque c/w Psoriasis     See annotation      Assessment / Plan:        Scar conditions/skin fibrosis  Screening, malignant neoplasm, skin  Screening exam for skin cancer  Scar of the left posterior shoulder, left mid-back, hx of NMSC.  No evidence of recurrence on physical exam today.  + hypertrophic scar of the left mid-back, pt defers ILK. Continue routine skin surveillance. Daily sunscreen advised.    Hemangioma, unspecified site  Venous lake  + venous lake on the lips, angiomas on body. Reassurance given.  Lesions are benign.    Pruritus  Pt currently undergoing w/u by Dr. Cortez for elevated Cr.  Pt wishes to avoid antihistamines and topical steroids at this time. She will continue with herbal supplements    Actinic elastosis  Of the forehead, discussed relationship to actinic damage. The patient acknowledged understanding. Pt reports circular swellings of the area, discussed possible biopsy in the future if pt feels area has worsened.              Follow up in about 6  months (around 11/13/2020).

## 2020-05-18 ENCOUNTER — OFFICE VISIT (OUTPATIENT)
Dept: CARDIOLOGY | Facility: CLINIC | Age: 85
End: 2020-05-18
Payer: MEDICARE

## 2020-05-18 ENCOUNTER — PATIENT OUTREACH (OUTPATIENT)
Dept: ADMINISTRATIVE | Facility: OTHER | Age: 85
End: 2020-05-18

## 2020-05-18 VITALS
BODY MASS INDEX: 29.59 KG/M2 | SYSTOLIC BLOOD PRESSURE: 178 MMHG | HEART RATE: 57 BPM | WEIGHT: 173.31 LBS | HEIGHT: 64 IN | OXYGEN SATURATION: 95 % | DIASTOLIC BLOOD PRESSURE: 82 MMHG

## 2020-05-18 DIAGNOSIS — E78.2 MIXED HYPERLIPIDEMIA: Chronic | ICD-10-CM

## 2020-05-18 DIAGNOSIS — I10 ESSENTIAL HYPERTENSION: Primary | Chronic | ICD-10-CM

## 2020-05-18 DIAGNOSIS — I50.30 HEART FAILURE WITH PRESERVED LEFT VENTRICULAR FUNCTION (HFPEF): ICD-10-CM

## 2020-05-18 DIAGNOSIS — I51.3 THROMBUS OF LEFT ATRIAL APPENDAGE: ICD-10-CM

## 2020-05-18 PROCEDURE — 99214 PR OFFICE/OUTPT VISIT, EST, LEVL IV, 30-39 MIN: ICD-10-PCS | Mod: HCNC,S$GLB,, | Performed by: INTERNAL MEDICINE

## 2020-05-18 PROCEDURE — 1159F MED LIST DOCD IN RCRD: CPT | Mod: HCNC,S$GLB,, | Performed by: INTERNAL MEDICINE

## 2020-05-18 PROCEDURE — 1126F PR PAIN SEVERITY QUANTIFIED, NO PAIN PRESENT: ICD-10-PCS | Mod: HCNC,S$GLB,, | Performed by: INTERNAL MEDICINE

## 2020-05-18 PROCEDURE — 1101F PR PT FALLS ASSESS DOC 0-1 FALLS W/OUT INJ PAST YR: ICD-10-PCS | Mod: HCNC,CPTII,S$GLB, | Performed by: INTERNAL MEDICINE

## 2020-05-18 PROCEDURE — 99499 RISK ADDL DX/OHS AUDIT: ICD-10-PCS | Mod: HCNC,S$GLB,, | Performed by: INTERNAL MEDICINE

## 2020-05-18 PROCEDURE — 99499 UNLISTED E&M SERVICE: CPT | Mod: HCNC,S$GLB,, | Performed by: INTERNAL MEDICINE

## 2020-05-18 PROCEDURE — 1101F PT FALLS ASSESS-DOCD LE1/YR: CPT | Mod: HCNC,CPTII,S$GLB, | Performed by: INTERNAL MEDICINE

## 2020-05-18 PROCEDURE — 99999 PR PBB SHADOW E&M-EST. PATIENT-LVL III: ICD-10-PCS | Mod: PBBFAC,HCNC,, | Performed by: INTERNAL MEDICINE

## 2020-05-18 PROCEDURE — 1126F AMNT PAIN NOTED NONE PRSNT: CPT | Mod: HCNC,S$GLB,, | Performed by: INTERNAL MEDICINE

## 2020-05-18 PROCEDURE — 1159F PR MEDICATION LIST DOCUMENTED IN MEDICAL RECORD: ICD-10-PCS | Mod: HCNC,S$GLB,, | Performed by: INTERNAL MEDICINE

## 2020-05-18 PROCEDURE — 99214 OFFICE O/P EST MOD 30 MIN: CPT | Mod: HCNC,S$GLB,, | Performed by: INTERNAL MEDICINE

## 2020-05-18 PROCEDURE — 99999 PR PBB SHADOW E&M-EST. PATIENT-LVL III: CPT | Mod: PBBFAC,HCNC,, | Performed by: INTERNAL MEDICINE

## 2020-05-18 RX ORDER — AMIODARONE HYDROCHLORIDE 200 MG/1
100 TABLET ORAL DAILY
Qty: 45 TABLET | Refills: 11 | Status: SHIPPED | OUTPATIENT
Start: 2020-05-18 | End: 2021-06-17

## 2020-05-29 ENCOUNTER — PATIENT OUTREACH (OUTPATIENT)
Dept: ADMINISTRATIVE | Facility: OTHER | Age: 85
End: 2020-05-29

## 2020-06-01 ENCOUNTER — OFFICE VISIT (OUTPATIENT)
Dept: UROLOGY | Facility: CLINIC | Age: 85
End: 2020-06-01
Payer: MEDICARE

## 2020-06-01 VITALS
SYSTOLIC BLOOD PRESSURE: 140 MMHG | WEIGHT: 172.38 LBS | TEMPERATURE: 97 F | DIASTOLIC BLOOD PRESSURE: 64 MMHG | BODY MASS INDEX: 29.59 KG/M2

## 2020-06-01 DIAGNOSIS — N39.0 URINARY TRACT INFECTION WITHOUT HEMATURIA, SITE UNSPECIFIED: ICD-10-CM

## 2020-06-01 DIAGNOSIS — N32.81 OAB (OVERACTIVE BLADDER): Primary | ICD-10-CM

## 2020-06-01 DIAGNOSIS — I10 HYPERTENSION, UNSPECIFIED TYPE: ICD-10-CM

## 2020-06-01 DIAGNOSIS — R35.1 NOCTURIA: ICD-10-CM

## 2020-06-01 LAB
BACTERIA #/AREA URNS AUTO: ABNORMAL /HPF
BILIRUB SERPL-MCNC: NORMAL MG/DL
BLOOD URINE, POC: NORMAL
COLOR, POC UA: YELLOW
GLUCOSE UR QL STRIP: NORMAL
HYALINE CASTS UR QL AUTO: 3 /LPF
KETONES UR QL STRIP: NORMAL
LEUKOCYTE ESTERASE URINE, POC: NORMAL
MICROSCOPIC COMMENT: ABNORMAL
NITRITE, POC UA: NORMAL
PH, POC UA: 5
POC RESIDUAL URINE VOLUME: 13 ML (ref 0–100)
PROTEIN, POC: NORMAL
RBC #/AREA URNS AUTO: 5 /HPF (ref 0–4)
SPECIFIC GRAVITY, POC UA: 1.01
UROBILINOGEN, POC UA: NORMAL
WBC #/AREA URNS AUTO: 0 /HPF (ref 0–5)

## 2020-06-01 PROCEDURE — 87086 URINE CULTURE/COLONY COUNT: CPT | Mod: HCNC

## 2020-06-01 PROCEDURE — 99204 OFFICE O/P NEW MOD 45 MIN: CPT | Mod: HCNC,25,S$GLB, | Performed by: UROLOGY

## 2020-06-01 PROCEDURE — 1101F PT FALLS ASSESS-DOCD LE1/YR: CPT | Mod: HCNC,CPTII,S$GLB, | Performed by: UROLOGY

## 2020-06-01 PROCEDURE — 99499 RISK ADDL DX/OHS AUDIT: ICD-10-PCS | Mod: HCNC,S$GLB,, | Performed by: UROLOGY

## 2020-06-01 PROCEDURE — 81002 URINALYSIS NONAUTO W/O SCOPE: CPT | Mod: HCNC,S$GLB,, | Performed by: UROLOGY

## 2020-06-01 PROCEDURE — 51798 POCT BLADDER SCAN: ICD-10-PCS | Mod: HCNC,S$GLB,, | Performed by: UROLOGY

## 2020-06-01 PROCEDURE — 99499 UNLISTED E&M SERVICE: CPT | Mod: HCNC,S$GLB,, | Performed by: UROLOGY

## 2020-06-01 PROCEDURE — 99999 PR PBB SHADOW E&M-EST. PATIENT-LVL III: ICD-10-PCS | Mod: PBBFAC,HCNC,, | Performed by: UROLOGY

## 2020-06-01 PROCEDURE — 81001 URINALYSIS AUTO W/SCOPE: CPT | Mod: HCNC

## 2020-06-01 PROCEDURE — 81002 POCT URINE DIPSTICK WITHOUT MICROSCOPE: ICD-10-PCS | Mod: HCNC,S$GLB,, | Performed by: UROLOGY

## 2020-06-01 PROCEDURE — 99999 PR PBB SHADOW E&M-EST. PATIENT-LVL III: CPT | Mod: PBBFAC,HCNC,, | Performed by: UROLOGY

## 2020-06-01 PROCEDURE — 99204 PR OFFICE/OUTPT VISIT, NEW, LEVL IV, 45-59 MIN: ICD-10-PCS | Mod: HCNC,25,S$GLB, | Performed by: UROLOGY

## 2020-06-01 PROCEDURE — 1101F PR PT FALLS ASSESS DOC 0-1 FALLS W/OUT INJ PAST YR: ICD-10-PCS | Mod: HCNC,CPTII,S$GLB, | Performed by: UROLOGY

## 2020-06-01 PROCEDURE — 1126F AMNT PAIN NOTED NONE PRSNT: CPT | Mod: HCNC,S$GLB,, | Performed by: UROLOGY

## 2020-06-01 PROCEDURE — 1159F MED LIST DOCD IN RCRD: CPT | Mod: HCNC,S$GLB,, | Performed by: UROLOGY

## 2020-06-01 PROCEDURE — 1159F PR MEDICATION LIST DOCUMENTED IN MEDICAL RECORD: ICD-10-PCS | Mod: HCNC,S$GLB,, | Performed by: UROLOGY

## 2020-06-01 PROCEDURE — 51798 US URINE CAPACITY MEASURE: CPT | Mod: HCNC,S$GLB,, | Performed by: UROLOGY

## 2020-06-01 PROCEDURE — 1126F PR PAIN SEVERITY QUANTIFIED, NO PAIN PRESENT: ICD-10-PCS | Mod: HCNC,S$GLB,, | Performed by: UROLOGY

## 2020-06-01 NOTE — LETTER
June 1, 2020      Sven Cortez MD  [address]  [place] [address]           '[facility]  [address] [place] [id number]  Phone: [phone]  Fax: [phone]          Patient: Shirley Metz   MR Number: [id number]   YOB: 1932   Date of Visit: 6/1/2020       Dear Dr. Sven Cortez:    Thank you for referring Shirley Metz to me for evaluation. Attached you will find relevant portions of my assessment and plan of care.    If you have questions, please do not hesitate to call me. I look forward to following Shirley Metz along with you.    Sincerely,    Patrick Queen IV, MD    Enclosure  CC:  No Recipients    If you would like to receive this communication electronically, please contact [email or web address] or [phone] to request more information on [employer] Link access.    For providers and/or their staff who would like to refer a patient to [facility], please contact us through our one-stop-shop provider referral line, [facility], at [phone].    If you feel you have received this communication in error or would no longer like to receive these types of communications, please e-mail [email or web address]

## 2020-06-01 NOTE — PROGRESS NOTES
Chief Complaint: Nocturia    HPI:   6/1/20: 86 yo woman referred by Dr. Terrell with CKD-3 and trouble sleeping with nocturiz x3.  Takes diuretic in AM.  No abd/pelvic pain and no exac/rel factors.  Takes miralax bid for constipation.  No hematuria.  No urolithiasis.  No urinary bother.  No sig  history.   Has an eye pressure problem with multiple surgeries.  Decaff coffee.  Has to have chocolate daily, usually right after lunch.  One good glass of water with her pills at night.  Tested, does not have sleep apnea.    Allergies:  Claritin [loratadine]; Hydrocodone-acetaminophen; Labetalol; Naproxen; Verapamil; Vibramycin [doxycycline calcium]; Amlodipine; Coreg [carvedilol]; Fenofibrate; Hydralazine analogues; Isosorbide; Lasix [furosemide]; Moxifloxacin; Timolol; Adhesive; Allegra [fexofenadine]; Codeine; Doxazosin; Erythromycin; Hydrocortisone (bulk); Macrolide antibiotics; Patanol [olopatadine]; Prednisone; Statins-hmg-coa reductase inhibitors; and Xalatan [latanoprost]    Medications: has a current medication list which includes the following prescription(s): acetaminophen, alprazolam, amiodarone, b complex vitamins, cholecalciferol (vitamin d3), coenzyme q10, eliquis, fish oil-omega-3 fatty acids, losartan, loteprednol etabonate, metoprolol tartrate, polyethylene glycol 3350, rhopressa, travatan z, hydrochlorothiazide, isosorbide mononitrate, and lotemax, and the following Facility-Administered Medications: sodium chloride 0.9%.    Review of Systems:  General: No fever, chills, fatigability, or weight loss.  Skin: No rashes, itching, or changes in color or texture of skin.  Chest: Denies RUIZ, cyanosis, wheezing, cough, and sputum production.  Abdomen: Appetite fine. No weight loss. Denies diarrhea, abdominal pain, hematemesis, or blood in stool.  Musculoskeletal: No joint stiffness or swelling. Denies back pain.  : As above.  All other review of systems negative.    PMH:   has a past medical history of  Anemia (11/29/2018), Anxiety (11/28/2017), Arthritis, Atrial fibrillation with RVR (10/9/2019), Back pain, Basal cell carcinoma (03/29/2018), Chronic diastolic congestive heart failure (10/15/2019), Colon polyps, Fuchs' corneal dystrophy, General anesthetics causing adverse effect in therapeutic use, Glaucoma, Glaucoma, Heart failure with preserved left ventricular function (HFpEF) (7/24/2018), Hyperlipidemia, Hypertension, Macular degeneration (dry), Obesity, Squamous cell carcinoma (01/08/2019), Trouble in sleeping, and Urinary tract infection.    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    FamHx: family history includes Cancer (age of onset: 88) in her father; Heart disease in her mother; Hypertension in her mother.    SocHx:  reports that she has never smoked. She has never used smokeless tobacco. She reports that she drank about 2.0 standard drinks of alcohol per week. She reports that she does not use drugs.     Physical Exam:  Vitals:   Vitals:    06/01/20 1033   BP: (!) 140/64   Temp: 97.3 °F (36.3 °C)     General: A&Ox3. No apparent distress. No deformities.  Neck: No masses. Normal thyroid.  Lungs: normal inspiration. No use of accessory muscles.  Heart: normal pulse. No arrhythmias.  Abdomen: Soft. NT. ND. No masses. No hernias. No hepatosplenomegaly.  Lymphatic: Neck and groin nodes negative.  Skin: The skin is warm and dry. No  jaundice.  Ext: No c/c/e.  : External genitalia normal.     Labs/Studies:   Urinalysis performed in clinic, summary: UA normal     Impression/Plan:   1. HTN: mild elev; takes her meds discussed  2. Anticholinergics absolutely contraindicated secondary to eye pressure/glaucoma  3. Discussed OAB/nocturia. Offered mybetriq but she defers.

## 2020-06-01 NOTE — PROGRESS NOTES
.Pt in for cath ua/cs. Specimen collected via sterile technique. Pt tolerated well. Informed that we will contact her with results. Pt verbalized understanding.

## 2020-06-02 ENCOUNTER — TELEPHONE (OUTPATIENT)
Dept: UROLOGY | Facility: CLINIC | Age: 85
End: 2020-06-02

## 2020-06-03 LAB — BACTERIA UR CULT: NO GROWTH

## 2020-06-08 ENCOUNTER — PATIENT OUTREACH (OUTPATIENT)
Dept: ADMINISTRATIVE | Facility: OTHER | Age: 85
End: 2020-06-08

## 2020-06-08 NOTE — PROGRESS NOTES
Chart reviewed.   Immunizations: Triggered Imm Registry     Orders placed: n/a  Upcoming appts to satisfy SEBAS topics: n/a

## 2020-06-09 ENCOUNTER — PATIENT MESSAGE (OUTPATIENT)
Dept: CARDIOLOGY | Facility: CLINIC | Age: 85
End: 2020-06-09

## 2020-06-09 ENCOUNTER — TELEPHONE (OUTPATIENT)
Dept: OPHTHALMOLOGY | Facility: CLINIC | Age: 85
End: 2020-06-09

## 2020-06-09 DIAGNOSIS — M79.641 RIGHT HAND PAIN: Primary | ICD-10-CM

## 2020-06-09 NOTE — TELEPHONE ENCOUNTER
----- Message from Kanika Rojas sent at 6/9/2020 11:45 AM CDT -----  Contact: pt  Pt is calling the staff regarding to speak with the staff regarding to explain what happen with today's appt and blood pressure.    Pt call back to be advised [phone]    Thanks

## 2020-06-16 ENCOUNTER — OFFICE VISIT (OUTPATIENT)
Dept: OPHTHALMOLOGY | Facility: CLINIC | Age: 85
End: 2020-06-16
Payer: MEDICARE

## 2020-06-16 DIAGNOSIS — Z96.1 PSEUDOPHAKIA: ICD-10-CM

## 2020-06-16 DIAGNOSIS — Z94.7 HISTORY OF CORNEA TRANSPLANT: ICD-10-CM

## 2020-06-16 DIAGNOSIS — M35.01 KERATITIS SICCA, BILATERAL: ICD-10-CM

## 2020-06-16 DIAGNOSIS — H40.1134 PRIMARY OPEN ANGLE GLAUCOMA OF BOTH EYES, INDETERMINATE STAGE: Primary | ICD-10-CM

## 2020-06-16 PROCEDURE — 92012 PR EYE EXAM, EST PATIENT,INTERMED: ICD-10-PCS | Mod: HCNC,S$GLB,, | Performed by: OPHTHALMOLOGY

## 2020-06-16 PROCEDURE — 99999 PR PBB SHADOW E&M-EST. PATIENT-LVL III: CPT | Mod: PBBFAC,HCNC,, | Performed by: OPHTHALMOLOGY

## 2020-06-16 PROCEDURE — 92012 INTRM OPH EXAM EST PATIENT: CPT | Mod: HCNC,S$GLB,, | Performed by: OPHTHALMOLOGY

## 2020-06-16 PROCEDURE — 99999 PR PBB SHADOW E&M-EST. PATIENT-LVL III: ICD-10-PCS | Mod: PBBFAC,HCNC,, | Performed by: OPHTHALMOLOGY

## 2020-06-16 RX ORDER — LOTEPREDNOL ETABONATE 5 MG/ML
1 SUSPENSION/ DROPS OPHTHALMIC DAILY
Qty: 10 ML | Refills: 3 | Status: SHIPPED | OUTPATIENT
Start: 2020-06-16 | End: 2022-03-16 | Stop reason: SDUPTHER

## 2020-06-16 NOTE — PROGRESS NOTES
SUBJECTIVE  AdrianaIfrah Metz is 87 y.o. female  Corrected distance visual acuity was 20/25 -2 in the right eye and 20/30 -2 in the left eye.   Chief Complaint   Patient presents with    Glaucoma          HPI     Pt here for iop check doing well with drops         1. Mild COAG Goal < 19  SLT OD 3/04 (20 to 15) + 3/11 (19 to 15)  SLT OS 5/05 (20 to 14) +5/11 (18-19 to 15) + 3/12 (min resp.) + 3/11/15   (20.5-17.5) + 3/7/18 (24- 21)  (Cosopt, Xalatan, and Timolol cause Myalgias)  (Alphagan causes redness) (zioptan too expensive)  2. PCIOL OU  3. Dry AMD  4. Fuch's Dystrophy   DSAEK OD 4/16 Dr. Quintanilla (followed by [facility] every summer)  DSAEK OS 8/15 Dr. Quintanilla  5. Dry Eye -intolerance to Restasis   6. Recent A-Fib. (from Labetolol ?)      Lotemax Mon, Wed, Fri and Sun once daily OU  Systane Ultra 4-5 tiimes daily  Travatan Z qhs os  Rhopressa qhs OS    Last edited by Kiersten Phillips MA on 6/16/2020  1:15 PM. (History)         Assessment /Plan :  1. Primary open angle glaucoma of both eyes, indeterminate stage Doing well, IOP within acceptable range relative to target IOP and no evidence of progression. Continue current treatment. Reviewed importance of continued compliance with treatment and follow up.     2. Keratitis sicca, bilateral  -- Condition stable, no therapeutic change required. Monitoring routinely.     3. Pseudophakia  -- Condition stable, no therapeutic change required. Monitoring routinely.     4. History of cornea transplant   Inveltys increased the IOP so change to Lotemax 0.5% ou qd     RTC as scheduled or prn

## 2020-06-17 ENCOUNTER — TELEPHONE (OUTPATIENT)
Dept: ORTHOPEDICS | Facility: CLINIC | Age: 85
End: 2020-06-17

## 2020-06-17 NOTE — TELEPHONE ENCOUNTER
Dr. Fish will not be in clinic 6-19-20 has surgery. Pt is rescheduled to 7-8-20 840a. Pt would not like to X-ray, until she sees Dr. Fish. Xray cancelled. Pt [facility] [place] location for appt

## 2020-06-22 ENCOUNTER — TELEPHONE (OUTPATIENT)
Dept: PHARMACY | Facility: CLINIC | Age: 85
End: 2020-06-22

## 2020-06-22 NOTE — TELEPHONE ENCOUNTER
The prior authorization for Shirley Metz's Lotemax has been submitted on 6/22/2020 @ 1:39pm.  It can take up to 72 hours for a decision to be rendered from the insurance.  Patient is aware of PA and process.  Please let me know if you have any questions.    Thank You!   Pily Perdomo CPhT, B.A  Patient Care Advocate   [facility]  Phone: [phone] Ext [phone]  Fax: [phone]

## 2020-06-26 ENCOUNTER — LAB VISIT (OUTPATIENT)
Dept: LAB | Facility: HOSPITAL | Age: 85
End: 2020-06-26
Attending: INTERNAL MEDICINE
Payer: MEDICARE

## 2020-06-26 ENCOUNTER — TELEPHONE (OUTPATIENT)
Dept: NEPHROLOGY | Facility: CLINIC | Age: 85
End: 2020-06-26

## 2020-06-26 DIAGNOSIS — N18.30 CKD (CHRONIC KIDNEY DISEASE) STAGE 3, GFR 30-59 ML/MIN: ICD-10-CM

## 2020-06-26 LAB
ALBUMIN SERPL BCP-MCNC: 3.3 G/DL (ref 3.5–5.2)
ANION GAP SERPL CALC-SCNC: 7 MMOL/L (ref 8–16)
BASOPHILS # BLD AUTO: 0.05 K/UL (ref 0–0.2)
BASOPHILS NFR BLD: 0.8 % (ref 0–1.9)
BUN SERPL-MCNC: 28 MG/DL (ref 8–23)
CALCIUM SERPL-MCNC: 9.4 MG/DL (ref 8.7–10.5)
CHLORIDE SERPL-SCNC: 103 MMOL/L (ref 95–110)
CO2 SERPL-SCNC: 32 MMOL/L (ref 23–29)
CREAT SERPL-MCNC: 1.6 MG/DL (ref 0.5–1.4)
DIFFERENTIAL METHOD: ABNORMAL
EOSINOPHIL # BLD AUTO: 0.4 K/UL (ref 0–0.5)
EOSINOPHIL NFR BLD: 5.7 % (ref 0–8)
ERYTHROCYTE [DISTWIDTH] IN BLOOD BY AUTOMATED COUNT: 13.2 % (ref 11.5–14.5)
EST. GFR  (AFRICAN AMERICAN): 33 ML/MIN/1.73 M^2
EST. GFR  (NON AFRICAN AMERICAN): 29 ML/MIN/1.73 M^2
GLUCOSE SERPL-MCNC: 136 MG/DL (ref 70–110)
HCT VFR BLD AUTO: 37.6 % (ref 37–48.5)
HGB BLD-MCNC: 12.4 G/DL (ref 12–16)
IMM GRANULOCYTES # BLD AUTO: 0.01 K/UL (ref 0–0.04)
IMM GRANULOCYTES NFR BLD AUTO: 0.2 % (ref 0–0.5)
LYMPHOCYTES # BLD AUTO: 1.5 K/UL (ref 1–4.8)
LYMPHOCYTES NFR BLD: 24.1 % (ref 18–48)
MCH RBC QN AUTO: 33.2 PG (ref 27–31)
MCHC RBC AUTO-ENTMCNC: 33 G/DL (ref 32–36)
MCV RBC AUTO: 101 FL (ref 82–98)
MONOCYTES # BLD AUTO: 0.5 K/UL (ref 0.3–1)
MONOCYTES NFR BLD: 8.7 % (ref 4–15)
NEUTROPHILS # BLD AUTO: 3.7 K/UL (ref 1.8–7.7)
NEUTROPHILS NFR BLD: 60.5 % (ref 38–73)
NRBC BLD-RTO: 0 /100 WBC
PHOSPHATE SERPL-MCNC: 3.7 MG/DL (ref 2.7–4.5)
PLATELET # BLD AUTO: 200 K/UL (ref 150–350)
PMV BLD AUTO: 9.4 FL (ref 9.2–12.9)
POTASSIUM SERPL-SCNC: 4 MMOL/L (ref 3.5–5.1)
RBC # BLD AUTO: 3.74 M/UL (ref 4–5.4)
SODIUM SERPL-SCNC: 142 MMOL/L (ref 136–145)
WBC # BLD AUTO: 6.18 K/UL (ref 3.9–12.7)

## 2020-06-26 PROCEDURE — 85025 COMPLETE CBC W/AUTO DIFF WBC: CPT | Mod: HCNC

## 2020-06-26 PROCEDURE — 36415 COLL VENOUS BLD VENIPUNCTURE: CPT | Mod: HCNC

## 2020-06-26 PROCEDURE — 80069 RENAL FUNCTION PANEL: CPT | Mod: HCNC

## 2020-07-13 ENCOUNTER — PATIENT MESSAGE (OUTPATIENT)
Dept: UROLOGY | Facility: CLINIC | Age: 85
End: 2020-07-13

## 2020-07-28 ENCOUNTER — PATIENT OUTREACH (OUTPATIENT)
Dept: ADMINISTRATIVE | Facility: OTHER | Age: 85
End: 2020-07-28

## 2020-07-29 ENCOUNTER — CLINICAL SUPPORT (OUTPATIENT)
Dept: UROLOGY | Facility: CLINIC | Age: 85
End: 2020-07-29
Payer: MEDICARE

## 2020-07-29 ENCOUNTER — OFFICE VISIT (OUTPATIENT)
Dept: ORTHOPEDICS | Facility: CLINIC | Age: 85
End: 2020-07-29
Payer: MEDICARE

## 2020-07-29 VITALS
WEIGHT: 172.38 LBS | DIASTOLIC BLOOD PRESSURE: 72 MMHG | BODY MASS INDEX: 29.37 KG/M2 | BODY MASS INDEX: 29.43 KG/M2 | HEIGHT: 64 IN | HEIGHT: 64 IN | HEART RATE: 56 BPM | SYSTOLIC BLOOD PRESSURE: 152 MMHG | WEIGHT: 172 LBS

## 2020-07-29 DIAGNOSIS — M72.0 DUPUYTREN'S CONTRACTURE OF LEFT HAND: Primary | ICD-10-CM

## 2020-07-29 DIAGNOSIS — N39.0 URINARY TRACT INFECTION WITHOUT HEMATURIA, SITE UNSPECIFIED: Primary | ICD-10-CM

## 2020-07-29 PROCEDURE — 81001 URINALYSIS AUTO W/SCOPE: CPT | Mod: HCNC

## 2020-07-29 PROCEDURE — 99213 OFFICE O/P EST LOW 20 MIN: CPT | Mod: HCNC,S$GLB,, | Performed by: ORTHOPAEDIC SURGERY

## 2020-07-29 PROCEDURE — 1126F PR PAIN SEVERITY QUANTIFIED, NO PAIN PRESENT: ICD-10-PCS | Mod: HCNC,S$GLB,, | Performed by: ORTHOPAEDIC SURGERY

## 2020-07-29 PROCEDURE — 99213 PR OFFICE/OUTPT VISIT, EST, LEVL III, 20-29 MIN: ICD-10-PCS | Mod: HCNC,S$GLB,, | Performed by: ORTHOPAEDIC SURGERY

## 2020-07-29 PROCEDURE — 99999 PR PBB SHADOW E&M-EST. PATIENT-LVL III: CPT | Mod: PBBFAC,HCNC,,

## 2020-07-29 PROCEDURE — 1159F MED LIST DOCD IN RCRD: CPT | Mod: HCNC,S$GLB,, | Performed by: ORTHOPAEDIC SURGERY

## 2020-07-29 PROCEDURE — 1101F PT FALLS ASSESS-DOCD LE1/YR: CPT | Mod: HCNC,CPTII,S$GLB, | Performed by: ORTHOPAEDIC SURGERY

## 2020-07-29 PROCEDURE — 1159F PR MEDICATION LIST DOCUMENTED IN MEDICAL RECORD: ICD-10-PCS | Mod: HCNC,S$GLB,, | Performed by: ORTHOPAEDIC SURGERY

## 2020-07-29 PROCEDURE — 1101F PR PT FALLS ASSESS DOC 0-1 FALLS W/OUT INJ PAST YR: ICD-10-PCS | Mod: HCNC,CPTII,S$GLB, | Performed by: ORTHOPAEDIC SURGERY

## 2020-07-29 PROCEDURE — 87086 URINE CULTURE/COLONY COUNT: CPT | Mod: HCNC

## 2020-07-29 PROCEDURE — 99999 PR PBB SHADOW E&M-EST. PATIENT-LVL III: ICD-10-PCS | Mod: PBBFAC,HCNC,, | Performed by: ORTHOPAEDIC SURGERY

## 2020-07-29 PROCEDURE — 1126F AMNT PAIN NOTED NONE PRSNT: CPT | Mod: HCNC,S$GLB,, | Performed by: ORTHOPAEDIC SURGERY

## 2020-07-29 PROCEDURE — 99999 PR PBB SHADOW E&M-EST. PATIENT-LVL III: CPT | Mod: PBBFAC,HCNC,, | Performed by: ORTHOPAEDIC SURGERY

## 2020-07-29 PROCEDURE — 99999 PR PBB SHADOW E&M-EST. PATIENT-LVL III: ICD-10-PCS | Mod: PBBFAC,HCNC,,

## 2020-07-29 NOTE — PROGRESS NOTES
..Pt in for cath ua/cs. Specimen collected via sterile technique. Pt tolerated well. Informed that we will contact her with results. Pt verbalized understanding.

## 2020-07-29 NOTE — PROGRESS NOTES
Subjective:     Patient ID: Shirley Metz is a 87 y.o. female.    Chief Complaint: Pain of the Right Hand    The patient is an 87-year-old female who has previously had arthrodesis left thumb metacarpophalangeal joint by  and did well.  She has had previous carpal tunnel surgery.  She has developed a early Dupuytren's contracture left ring finger metacarpophalangeal joint of about 10°.  She has no functional problems at this point.      Past Medical History:   Diagnosis Date    Anemia 11/29/2018    Anxiety 11/28/2017    Arthritis     Atrial fibrillation with RVR 10/9/2019    Back pain     Basal cell carcinoma 03/29/2018    left mid back    Chronic diastolic congestive heart failure 10/15/2019    Colon polyps     reported per patient.     Fuchs' corneal dystrophy     General anesthetics causing adverse effect in therapeutic use     woke up during surgery and gagged on ET tube    Glaucoma     Glaucoma     Heart failure with preserved left ventricular function (HFpEF) 7/24/2018    Hyperlipidemia     Hypertension     Macular degeneration dry    Obesity     Squamous cell carcinoma 01/08/2019    left shoulder    Trouble in sleeping     Urinary tract infection      Past Surgical History:   Procedure Laterality Date    ADENOIDECTOMY  1938    BREAST BIOPSY Left     1997. benign    BREAST CYST EXCISION Left 1997 approx    CARPAL TUNNEL RELEASE Right 2012 approx    CATARACT EXTRACTION Bilateral 1994    CHOLECYSTECTOMY  1975    CORNEAL TRANSPLANT Bilateral 08/2015 8/2015 - left; Right 4/2016    EYE SURGERY      Fuch's Corneal dystrophy - had 2nd operation with Dr. Quintanilla    EYE SURGERY      Cataract wIOL    left thumb Left 2011    removed cuboid for arthritis    REVERSE TOTAL SHOULDER ARTHROPLASTY Left 8/21/2018    Procedure: ARTHROPLASTY, SHOULDER, TOTAL, REVERSE;  Surgeon: Solomon Lujan MD;  Location: [facility];  Service: Orthopedics;  Laterality: Left;  WITH BICEP  TENODESIS    SKIN CANCER EXCISION Left 2017    BCC removal by Dr. Valadez    SLT- OS (aka TRABECULOPLASTY) Left 05/11/2011    repeat 3/14/12    TENOTOMY Left 8/21/2018    Procedure: TENOTOMY;  Surgeon: Sloomon Lujan MD;  Location: [facility] OR;  Service: Orthopedics;  Laterality: Left;    TONSILLECTOMY  1938    TREATMENT OF CARDIAC ARRHYTHMIA N/A 10/10/2019    Procedure: CARDIOVERSION;  Surgeon: Pete Durán MD;  Location: [facility] CATH LAB;  Service: Cardiology;  Laterality: N/A;    TREATMENT OF CARDIAC ARRHYTHMIA N/A 12/6/2019    Procedure: CARDIOVERSION;  Surgeon: Samy Castillo MD;  Location: [facility] CATH LAB;  Service: Cardiology;  Laterality: N/A;     Family History   Problem Relation Age of Onset    Heart disease Mother     Hypertension Mother     Cancer Father 88        sarcoma( ?Kaposi's ) on leg    Deep vein thrombosis Neg Hx     Ovarian cancer Neg Hx     Breast cancer Neg Hx     Kidney disease Neg Hx     Strabismus Neg Hx     Retinal detachment Neg Hx     Macular degeneration Neg Hx     Glaucoma Neg Hx     Blindness Neg Hx     Amblyopia Neg Hx     Eczema Neg Hx     Lupus Neg Hx     Melanoma Neg Hx     Psoriasis Neg Hx     Diabetes Neg Hx     Stroke Neg Hx     Mental retardation Neg Hx     Mental illness Neg Hx     Hyperlipidemia Neg Hx     COPD Neg Hx     Asthma Neg Hx     Depression Neg Hx     Alcohol abuse Neg Hx     Drug abuse Neg Hx      Social History     Socioeconomic History    Marital status:      Spouse name: Not on file    Number of children: Not on file    Years of education: Not on file    Highest education level: Not on file   Occupational History    Not on file   Social Needs    Financial resource strain: Not hard at all    Food insecurity     Worry: Never true     Inability: Never true    Transportation needs     Medical: No     Non-medical: No   Tobacco Use    Smoking status: Never Smoker    Smokeless tobacco: Never Used    Tobacco comment: history  of passive smoking from her ex-   Substance and Sexual Activity    Alcohol use: Not Currently     Alcohol/week: 2.0 standard drinks     Types: 2 Standard drinks or equivalent per week     Frequency: Monthly or less     Drinks per session: 1 or 2     Binge frequency: Never     Comment: occasional     Drug use: No    Sexual activity: Not Currently     Partners: Male     Birth control/protection: Post-menopausal     Comment: discussed protection for STD's   Lifestyle    Physical activity     Days per week: 0 days     Minutes per session: 20 min    Stress: To some extent   Relationships    Social connections     Talks on phone: More than three times a week     Gets together: Three times a week     Attends L[employer] day service: Not on file     Active member of club or organization: Yes     Attends meetings of clubs or organizations: More than 4 times per year     Relationship status:    Other Topics Concern    Are you pregnant or think you may be? No    Breast-feeding No   Social History Narrative     x 2,  x 1. Retired artist - previously doing jewelry/wall pieces; ; lives in [facility]; has 3 sons - 52( lives in [place], 54, and 56;exercises minimally - limited due to back issues. Still drives. Does have a Living Will.     Medication List with Changes/Refills   Current Medications    ACETAMINOPHEN (TYLENOL) 500 MG TABLET    Take 500 mg by mouth daily as needed. Taking 1 to 3    ALPRAZOLAM (XANAX) 0.5 MG TABLET    TAKE 1/2 - 1 TABLET BY MOUTH NIGHTLY FOR SLEEP OR ANXIETY    AMIODARONE (PACERONE) 200 MG TAB    Take 0.5 tablets (100 mg total) by mouth once daily.    B COMPLEX VITAMINS CAPSULE    Take 1 capsule by mouth once daily.    CHOLECALCIFEROL, VITAMIN D3, (VITAMIN D3) 2,000 UNIT CAP    Take 1 capsule by mouth once daily.    COENZYME Q10 200 MG CAPSULE    Take 400 mg by mouth once daily.     ELIQUIS 2.5 MG TAB    TAKE 1 TABLET (2.5 MG TOTAL) BY MOUTH 2 (TWO)  TIMES DAILY.    FISH OIL-OMEGA-3 FATTY ACIDS 300-1,000 MG CAPSULE    Take 2 g by mouth 2 (two) times daily.     HYDROCHLOROTHIAZIDE (HYDRODIURIL) 12.5 MG TAB    TAKE 1 TABLET (12.5 MG TOTAL) BY MOUTH ONCE DAILY.    ISOSORBIDE MONONITRATE (IMDUR) 60 MG 24 HR TABLET    Take 1 tablet (60 mg total) by mouth once daily.    LOSARTAN (COZAAR) 50 MG TABLET    Take 1 tablet (50 mg total) by mouth 2 (two) times a day. This is NO-PRINT - documentation of change only    LOTEMAX 0.5 % DRPG        LOTEPREDNOL (LOTEMAX) 0.5 % OPHTHALMIC SUSPENSION    Place 1 drop into both eyes once daily.    LOTEPREDNOL ETABONATE (INVELTYS) 1 % DRPS    Apply to eye every other day.    METOPROLOL TARTRATE (LOPRESSOR) 25 MG TABLET    Take 0.5 tablets (12.5 mg total) by mouth 2 (two) times daily.    POLYETHYLENE GLYCOL 3350 (MIRALAX ORAL)    Take by mouth once daily. Taking BID    RHOPRESSA 0.02 % DROP    PLACE 1 DROP INTO BOTH EYES EVERY EVENING.    TRAVATAN Z 0.004 % OPHTHALMIC SOLUTION    PLACE 1 DROP INTO THE LEFT EYE EVERY EVENING.     Review of patient's allergies indicates:   Allergen Reactions    Claritin [loratadine] Other (See Comments)     Double vision/spots    Hydrocodone-acetaminophen      wheezing    Labetalol Shortness Of Breath, Nausea Only and Other (See Comments)     Palpitations, LE weakness    Naproxen      wheezing    Verapamil Diarrhea    Vibramycin [doxycycline calcium] Other (See Comments)     Weakness nausea   sob    Amlodipine      Myalgias      Coreg [carvedilol] Swelling     lips    Fenofibrate      Causes muscle weakness    Hydralazine analogues      Muscle tremors/aches      Isosorbide     Lasix [furosemide]      myalgias    Moxifloxacin Other (See Comments)     Hives   muscle soreness    Timolol     Adhesive Rash     Clonidine patch - 2 mfg - contact dermatitis    Allegra [fexofenadine] Other (See Comments)     Double vision/spots     Codeine Diarrhea and Other (See Comments)     Loss of balance      Doxazosin Palpitations    Erythromycin Other (See Comments)     Complete weakness/could not walk    Hydrocortisone (bulk) Other (See Comments)     Only suppository/ caused muscle weakness    Macrolide antibiotics Other (See Comments)     Complete weakness/could not walk    Patanol [olopatadine] Other (See Comments)     Muscle weakness    Prednisone Anxiety    Statins-hmg-coa reductase inhibitors Other (See Comments)     Muscle weakness    Xalatan [latanoprost] Other (See Comments)     Muscle weakness     Review of Systems   Constitution: Negative for malaise/fatigue.   HENT: Negative for hearing loss.    Eyes: Positive for visual disturbance. Negative for double vision.   Cardiovascular: Positive for irregular heartbeat. Negative for chest pain.   Respiratory: Negative for shortness of breath.    Endocrine: Negative for cold intolerance.   Hematologic/Lymphatic: Does not bruise/bleed easily.   Skin: Negative for poor wound healing and suspicious lesions.   Musculoskeletal: Positive for myalgias. Negative for gout, joint pain and joint swelling.   Gastrointestinal: Negative for nausea and vomiting.   Genitourinary: Negative for dysuria.   Neurological: Positive for numbness, paresthesias and sensory change.   Psychiatric/Behavioral: Negative for depression, memory loss and substance abuse. The patient has insomnia and is nervous/anxious.    Allergic/Immunologic: Negative for persistent infections.       Objective:   Body mass index is 29.52 kg/m².  Vitals:    07/29/20 1030   BP: (!) 152/72   Pulse: (!) 56                General    Constitutional: She is oriented to person, place, and time. She appears well-developed and well-nourished. No distress.   HENT:   Head: Normocephalic.   Eyes: EOM are normal.   Neck: Normal range of motion.   Pulmonary/Chest: Effort normal.   Neurological: She is oriented to person, place, and time.   Psychiatric: She has a normal mood and affect.         Left Hand/Wrist Exam      Inspection   Scars: Wrist - present Hand -  absent  Effusion: Wrist - absent Hand -  absent    Other     Sensory Exam  Median Distribution: normal  Ulnar Distribution: normal  Radial Distribution: normal    Comments:  The patient has a pretendinous cord left ring finger with about a 10 degree flexion contracture metacarpophalangeal joint ring finger.  Table top test is very slightly positive.  There are no motor or sensory deficits.          Vascular Exam       Capillary Refill  Left Hand: normal capillary refill      Relevant imaging results reviewed and interpreted by me, discussed with the patient and / or family today radiographs left hand were reviewed which show 2 screws across the thumb metacarpophalangeal joint and good fusion.  The remainder of the exam is unremarkable.  Assessment:     Encounter Diagnosis   Name Primary?    Dupuytren's contracture of left hand Yes    Dupuytren's contracture left ring finger metacarpophalangeal joint     Plan:     The patient is doing well.  I see no indication for Xiaflex at this time.  If she becomes more symptomatic she will return for consideration of injection.                Disclaimer: This note was prepared using a voice recognition system and is likely to have sound alike errors within the text.

## 2020-07-30 ENCOUNTER — OFFICE VISIT (OUTPATIENT)
Dept: OPHTHALMOLOGY | Facility: CLINIC | Age: 85
End: 2020-07-30
Payer: MEDICARE

## 2020-07-30 DIAGNOSIS — Z94.7 HISTORY OF CORNEA TRANSPLANT: ICD-10-CM

## 2020-07-30 DIAGNOSIS — M35.01 KERATITIS SICCA, BILATERAL: ICD-10-CM

## 2020-07-30 DIAGNOSIS — Z96.1 PSEUDOPHAKIA: ICD-10-CM

## 2020-07-30 DIAGNOSIS — H40.1131 PRIMARY OPEN ANGLE GLAUCOMA (POAG) OF BOTH EYES, MILD STAGE: Primary | ICD-10-CM

## 2020-07-30 LAB
HYALINE CASTS UR QL AUTO: 1 /LPF
MICROSCOPIC COMMENT: NORMAL
RBC #/AREA URNS AUTO: 3 /HPF (ref 0–4)
WBC #/AREA URNS AUTO: 1 /HPF (ref 0–5)

## 2020-07-30 PROCEDURE — 92012 PR EYE EXAM, EST PATIENT,INTERMED: ICD-10-PCS | Mod: HCNC,S$GLB,, | Performed by: OPHTHALMOLOGY

## 2020-07-30 PROCEDURE — 92133 POSTERIOR SEGMENT OCT OPTIC NERVE(OCULAR COHERENCE TOMOGRAPHY) - OU - BOTH EYES: ICD-10-PCS | Mod: HCNC,S$GLB,, | Performed by: OPHTHALMOLOGY

## 2020-07-30 PROCEDURE — 92083 HUMPHREY VISUAL FIELD - OU - BOTH EYES: ICD-10-PCS | Mod: HCNC,S$GLB,, | Performed by: OPHTHALMOLOGY

## 2020-07-30 PROCEDURE — 92083 EXTENDED VISUAL FIELD XM: CPT | Mod: HCNC,S$GLB,, | Performed by: OPHTHALMOLOGY

## 2020-07-30 PROCEDURE — 92133 CPTRZD OPH DX IMG PST SGM ON: CPT | Mod: HCNC,S$GLB,, | Performed by: OPHTHALMOLOGY

## 2020-07-30 PROCEDURE — 99999 PR PBB SHADOW E&M-EST. PATIENT-LVL III: ICD-10-PCS | Mod: PBBFAC,HCNC,, | Performed by: OPHTHALMOLOGY

## 2020-07-30 PROCEDURE — 99999 PR PBB SHADOW E&M-EST. PATIENT-LVL III: CPT | Mod: PBBFAC,HCNC,, | Performed by: OPHTHALMOLOGY

## 2020-07-30 PROCEDURE — 92012 INTRM OPH EXAM EST PATIENT: CPT | Mod: HCNC,S$GLB,, | Performed by: OPHTHALMOLOGY

## 2020-07-30 RX ORDER — CLONIDINE HYDROCHLORIDE 0.1 MG/1
0.1 TABLET ORAL 2 TIMES DAILY PRN
COMMUNITY
Start: 2016-05-09 | End: 2021-12-15

## 2020-07-30 RX ORDER — LOSARTAN POTASSIUM 25 MG/1
TABLET ORAL
COMMUNITY
Start: 2020-07-18 | End: 2020-08-13 | Stop reason: SDUPTHER

## 2020-07-30 NOTE — PROGRESS NOTES
SUBJECTIVE  Shirley Metz is 87 y.o. female  Corrected distance visual acuity was 20/25 in the right eye and 20/40 in the left eye.   Chief Complaint   Patient presents with    Glaucoma     [facility] with [facility]          HPI     Glaucoma      Additional comments: IOP check with [facility]              Comments     Patient feels OU is doing well, no changes in VA and happy with current   glasses, using drops as directed.      Lives at [facility] sx on 8/21/18    1. Mild COAG Goal < 19  SLT OD 3/04 (20 to 15) + 3/11 (19 to 15)  SLT OS 5/05 (20 to 14) +5/11 (18-19 to 15) + 3/12 (min resp.) + 3/11/15   (20.5-17.5) + 3/7/18 (24- 21)  (Cosopt, Xalatan, and Timolol cause Myalgias)  (Alphagan causes redness) (zioptan too expensive)  Possible steroid responder  2. PCIOL OU  3. Dry AMD  4. Fuch's Dystrophy   DSAEK OD 4/16 Dr. Quintanilla (followed by [facility] every summer)  DSAEK OS 8/15 Dr. Quintanilla  Rx Inveltys (1% lotemax) (IOP 15/23) 5/29/20  5. Dry Eye -intolerance to Restasis   6. Recent A-Fib. (from Labetolol ?)      Eduardo OU: Mon, Wed,Fri and Sun once daily  Systane Ultra 4-5 tiimes daily  Travatan Z qhs os  Rhopressa qhs OS          Last edited by Bonita Petersen, Patient Care Assistant on 7/30/2020  8:25   AM. (History)         Assessment /Plan :  1. Primary open angle glaucoma (POAG) of both eyes, mild stage   Doing well, IOP OD within acceptable range relative to target IOP and no evidence of progression. Continue current treatment. Reviewed importance of continued compliance with treatment and follow up.    IOP OS not within acceptable range relative to target IOP with risk of irreversible visual loss. Better IOP control is recommended. Discussed options, risks, and benefits of additional medication, SLT laser, and/or incisional glaucoma surgery. Reviewed importance of continued compliance with treatment and follow up.     Patient chooses defer and recheck IOP next visit       2. Keratitis sicca,  bilateral continue with current treatment   3. Pseudophakia  -- Condition stable, no therapeutic change required. Monitoring routinely.     4. History of cornea transplant followed by Dr. Quintanilla     Return to clinic in 3 months  or as needed.  With Dilation and SDP

## 2020-07-31 LAB — BACTERIA UR CULT: NO GROWTH

## 2020-08-10 DIAGNOSIS — I10 ESSENTIAL HYPERTENSION: Primary | Chronic | ICD-10-CM

## 2020-08-14 RX ORDER — LOSARTAN POTASSIUM 25 MG/1
25 TABLET ORAL 2 TIMES DAILY
Qty: 180 TABLET | Refills: 3 | Status: SHIPPED | OUTPATIENT
Start: 2020-08-14 | End: 2021-01-08 | Stop reason: SDUPTHER

## 2020-08-18 ENCOUNTER — PES CALL (OUTPATIENT)
Dept: ADMINISTRATIVE | Facility: CLINIC | Age: 85
End: 2020-08-18

## 2020-08-20 ENCOUNTER — OFFICE VISIT (OUTPATIENT)
Dept: CARDIOLOGY | Facility: CLINIC | Age: 85
End: 2020-08-20
Payer: MEDICARE

## 2020-08-20 VITALS
DIASTOLIC BLOOD PRESSURE: 84 MMHG | WEIGHT: 171.5 LBS | BODY MASS INDEX: 29.44 KG/M2 | OXYGEN SATURATION: 97 % | SYSTOLIC BLOOD PRESSURE: 184 MMHG | HEART RATE: 61 BPM

## 2020-08-20 DIAGNOSIS — I48.0 PAROXYSMAL ATRIAL FIBRILLATION: Primary | ICD-10-CM

## 2020-08-20 DIAGNOSIS — I50.32 CHRONIC DIASTOLIC CONGESTIVE HEART FAILURE: ICD-10-CM

## 2020-08-20 DIAGNOSIS — E66.9 OBESITY (BMI 30.0-34.9): ICD-10-CM

## 2020-08-20 DIAGNOSIS — E78.2 MIXED HYPERLIPIDEMIA: Chronic | ICD-10-CM

## 2020-08-20 DIAGNOSIS — I10 ESSENTIAL HYPERTENSION: Chronic | ICD-10-CM

## 2020-08-20 PROCEDURE — 99214 OFFICE O/P EST MOD 30 MIN: CPT | Mod: HCNC,S$GLB,, | Performed by: INTERNAL MEDICINE

## 2020-08-20 PROCEDURE — 1159F MED LIST DOCD IN RCRD: CPT | Mod: HCNC,S$GLB,, | Performed by: INTERNAL MEDICINE

## 2020-08-20 PROCEDURE — 99214 PR OFFICE/OUTPT VISIT, EST, LEVL IV, 30-39 MIN: ICD-10-PCS | Mod: HCNC,S$GLB,, | Performed by: INTERNAL MEDICINE

## 2020-08-20 PROCEDURE — 1126F PR PAIN SEVERITY QUANTIFIED, NO PAIN PRESENT: ICD-10-PCS | Mod: HCNC,S$GLB,, | Performed by: INTERNAL MEDICINE

## 2020-08-20 PROCEDURE — 1126F AMNT PAIN NOTED NONE PRSNT: CPT | Mod: HCNC,S$GLB,, | Performed by: INTERNAL MEDICINE

## 2020-08-20 PROCEDURE — 1159F PR MEDICATION LIST DOCUMENTED IN MEDICAL RECORD: ICD-10-PCS | Mod: HCNC,S$GLB,, | Performed by: INTERNAL MEDICINE

## 2020-08-20 PROCEDURE — 99999 PR PBB SHADOW E&M-EST. PATIENT-LVL IV: CPT | Mod: PBBFAC,HCNC,, | Performed by: INTERNAL MEDICINE

## 2020-08-20 PROCEDURE — 1101F PR PT FALLS ASSESS DOC 0-1 FALLS W/OUT INJ PAST YR: ICD-10-PCS | Mod: HCNC,CPTII,S$GLB, | Performed by: INTERNAL MEDICINE

## 2020-08-20 PROCEDURE — 1101F PT FALLS ASSESS-DOCD LE1/YR: CPT | Mod: HCNC,CPTII,S$GLB, | Performed by: INTERNAL MEDICINE

## 2020-08-20 PROCEDURE — 99999 PR PBB SHADOW E&M-EST. PATIENT-LVL IV: ICD-10-PCS | Mod: PBBFAC,HCNC,, | Performed by: INTERNAL MEDICINE

## 2020-08-20 RX ORDER — ISOSORBIDE MONONITRATE 60 MG/1
60 TABLET, EXTENDED RELEASE ORAL DAILY
Qty: 90 TABLET | Refills: 3 | Status: SHIPPED | OUTPATIENT
Start: 2020-08-20 | End: 2021-05-06 | Stop reason: SDUPTHER

## 2020-08-20 RX ORDER — HYDROCHLOROTHIAZIDE 25 MG/1
25 TABLET ORAL DAILY
Qty: 90 TABLET | Refills: 3 | Status: SHIPPED | OUTPATIENT
Start: 2020-08-20 | End: 2020-09-22 | Stop reason: ALTCHOICE

## 2020-08-20 NOTE — PROGRESS NOTES
Subjective:   Patient ID:  Shirley Metz is a 87 y.o. female who presents for follow up of No chief complaint on file.      86 yo female. came in HTN.  PMH pafib, + positive SELVIN, HTN, HLD chfpEF and CKD, s/p corneal Tx   echo showed EF 60%. LVH   Nulcear stress test no ischemia  10/07 saw pt in the office for C/o recent weakness,  stomach upset,. Has fast pulse. Some dizziness, and Monet. ekg showed new onset afib with RVR. Increased Mwetoprolol to 100 mg bid and add cardizem and eliquis 2.5 m bid.  10/09, admitted to [facility] for chest pain. Troponin 0.035 chronic elevation. Afib with RVR  . Echo showed normal EF and mild MR> Next day, STACEY showed + SELVIN thrombus and DCCV cancelled. Continued Eliquis  12/07 repeated STACEY no SELVIN thrombus and DCCV x1 converted to sinus   LUM arterial and carotid US showed mild Dz.    went to ER due to severe left arm pain. Troponin 0.03 and lung perfusion scan negative for PE    Pt has had multiple allergies to HTN medication and multiple ER visits for uncontrolled HTN.   In the past 3 moths, felt better. Walks longer, more energy and less MONET. Sleeps with two pillows. Improved headache and blurred vision. Occasional skin ictching.   SBP high in  to 150 mmHG. Sometime 140 mmHG   Pt is satisfied her BP Her pulse was at 45 to 55 bpm.    And not took her clonidine for a week.    Today states that ok to continue Losartan 100 mg daily  Daily taking clonidine 1 mg daily  Continue Metoprolol 25 mg bid  In the past few weeks the BP controlled and pt feels better  Today stressful and BP high in the office              Past Medical History:   Diagnosis Date    Anemia 11/29/2018    Anxiety 11/28/2017    Arthritis     Atrial fibrillation with RVR 10/9/2019    Back pain     Basal cell carcinoma 03/29/2018    left mid back    Chronic diastolic congestive heart failure 10/15/2019    Colon polyps     reported per patient.     Fuchs' corneal  dystrophy     General anesthetics causing adverse effect in therapeutic use     woke up during surgery and gagged on ET tube    Glaucoma     Glaucoma     Heart failure with preserved left ventricular function (HFpEF) 7/24/2018    Hyperlipidemia     Hypertension     Macular degeneration dry    Obesity     Squamous cell carcinoma 01/08/2019    left shoulder    Trouble in sleeping     Urinary tract infection        Past Surgical History:   Procedure Laterality Date    ADENOIDECTOMY  1938    BREAST BIOPSY Left     1997. benign    BREAST CYST EXCISION Left 1997 approx    CARPAL TUNNEL RELEASE Right 2012 approx    CATARACT EXTRACTION Bilateral 1994    CHOLECYSTECTOMY  1975    CORNEAL TRANSPLANT Bilateral 08/2015 8/2015 - left; Right 4/2016    EYE SURGERY      Fuch's Corneal dystrophy - had 2nd operation with Dr. Quintanilla    EYE SURGERY      Cataract wIOL    left thumb Left 2011    removed cuboid for arthritis    REVERSE TOTAL SHOULDER ARTHROPLASTY Left 8/21/2018    Procedure: ARTHROPLASTY, SHOULDER, TOTAL, REVERSE;  Surgeon: Solomon Lujan MD;  Location: [facility] OR;  Service: Orthopedics;  Laterality: Left;  WITH BICEP TENODESIS    SKIN CANCER EXCISION Left 2017    BCC removal by Dr. Valadez    SLT- OS (aka TRABECULOPLASTY) Left 05/11/2011    repeat 3/14/12    TENOTOMY Left 8/21/2018    Procedure: TENOTOMY;  Surgeon: Solomon Lujan MD;  Location: [facility] OR;  Service: Orthopedics;  Laterality: Left;    TONSILLECTOMY  1938    TREATMENT OF CARDIAC ARRHYTHMIA N/A 10/10/2019    Procedure: CARDIOVERSION;  Surgeon: Pete Durán MD;  Location: [facility] CATH LAB;  Service: Cardiology;  Laterality: N/A;    TREATMENT OF CARDIAC ARRHYTHMIA N/A 12/6/2019    Procedure: CARDIOVERSION;  Surgeon: Samy Castillo MD;  Location: [facility] CATH LAB;  Service: Cardiology;  Laterality: N/A;       Social History     Tobacco Use    Smoking status: Never Smoker    Smokeless tobacco: Never Used    Tobacco comment: history of  passive smoking from her ex-   Substance Use Topics    Alcohol use: Not Currently     Alcohol/week: 2.0 standard drinks     Types: 2 Standard drinks or equivalent per week     Frequency: Monthly or less     Drinks per session: 1 or 2     Binge frequency: Never     Comment: occasional     Drug use: No       Family History   Problem Relation Age of Onset    Heart disease Mother     Hypertension Mother     Cancer Father 88        sarcoma( ?Kaposi's ) on leg    Deep vein thrombosis Neg Hx     Ovarian cancer Neg Hx     Breast cancer Neg Hx     Kidney disease Neg Hx     Strabismus Neg Hx     Retinal detachment Neg Hx     Macular degeneration Neg Hx     Glaucoma Neg Hx     Blindness Neg Hx     Amblyopia Neg Hx     Eczema Neg Hx     Lupus Neg Hx     Melanoma Neg Hx     Psoriasis Neg Hx     Diabetes Neg Hx     Stroke Neg Hx     Mental retardation Neg Hx     Mental illness Neg Hx     Hyperlipidemia Neg Hx     COPD Neg Hx     Asthma Neg Hx     Depression Neg Hx     Alcohol abuse Neg Hx     Drug abuse Neg Hx          Review of Systems   Constitution: Positive for malaise/fatigue and weight loss. Negative for decreased appetite, diaphoresis, fever and night sweats.   HENT: Negative for nosebleeds.    Eyes: Negative for blurred vision and double vision.   Cardiovascular: Positive for palpitations. Negative for chest pain, claudication, irregular heartbeat, leg swelling, near-syncope, orthopnea, paroxysmal nocturnal dyspnea and syncope.   Respiratory: Negative for cough, shortness of breath, sleep disturbances due to breathing, snoring, sputum production and wheezing.    Endocrine: Negative for cold intolerance and polyuria.   Hematologic/Lymphatic: Does not bruise/bleed easily.   Skin: Negative for rash.   Musculoskeletal: Negative for back pain, falls, joint pain, joint swelling and neck pain.   Gastrointestinal: Positive for abdominal pain. Negative for heartburn, nausea and vomiting.    Genitourinary: Negative for dysuria, frequency and hematuria.   Neurological: Negative for difficulty with concentration, dizziness, focal weakness, headaches, light-headedness, numbness, seizures and weakness.   Psychiatric/Behavioral: Negative for depression, memory loss and substance abuse. The patient does not have insomnia.    Allergic/Immunologic: Negative for HIV exposure and hives.       Objective:   Physical Exam   Constitutional: She is oriented to person, place, and time. She appears well-nourished.   HENT:   Head: Normocephalic.   Eyes: Pupils are equal, round, and reactive to light.   Neck: Normal carotid pulses and no JVD present. Carotid bruit is not present. No thyromegaly present.   Cardiovascular: Normal rate, regular rhythm, normal heart sounds and normal pulses.  No extrasystoles are present. PMI is not displaced. Exam reveals no gallop and no S3.   No murmur heard.  Pulmonary/Chest: Breath sounds normal. No stridor. No respiratory distress.   Abdominal: Soft. Bowel sounds are normal. There is no abdominal tenderness. There is no rebound.   Musculoskeletal: Normal range of motion.   Neurological: She is alert and oriented to person, place, and time.   Skin: Skin is intact. No rash noted.   Psychiatric: Her behavior is normal.       Lab Results   Component Value Date    CHOL 233 (H) 08/01/2019    CHOL 180 03/16/2018    CHOL 181 02/19/2018     Lab Results   Component Value Date    HDL 49 08/01/2019    HDL 43 03/16/2018    HDL 45 02/19/2018     Lab Results   Component Value Date    LDLCALC 155.0 08/01/2019    LDLCALC 117.6 03/16/2018    LDLCALC 118.2 02/19/2018     Lab Results   Component Value Date    TRIG 145 08/01/2019    TRIG 97 03/16/2018    TRIG 89 02/19/2018     Lab Results   Component Value Date    CHOLHDL 21.0 08/01/2019    CHOLHDL 23.9 03/16/2018    CHOLHDL 24.9 02/19/2018       Chemistry        Component Value Date/Time     06/26/2020 0926    K 4.0 06/26/2020 0926      06/26/2020 0926    CO2 32 (H) 06/26/2020 0926    BUN 28 (H) 06/26/2020 0926    CREATININE 1.6 (H) 06/26/2020 0926     (H) 06/26/2020 0926        Component Value Date/Time    CALCIUM 9.4 06/26/2020 0926    ALKPHOS 80 02/19/2020 0855    AST 20 02/19/2020 0855    ALT 16 02/19/2020 0855    BILITOT 0.8 02/19/2020 0855    ESTGFRAFRICA 33 (A) 06/26/2020 0926    EGFRNONAA 29 (A) 06/26/2020 0926          Lab Results   Component Value Date    HGBA1C 6.4 (H) 01/08/2020     Lab Results   Component Value Date    TSH 0.849 10/09/2019     Lab Results   Component Value Date    INR 1.0 02/12/2020    INR 1.0 01/25/2018    INR 1.0 01/01/2017     Lab Results   Component Value Date    WBC 6.18 06/26/2020    HGB 12.4 06/26/2020    HCT 37.6 06/26/2020     (H) 06/26/2020     06/26/2020     BMP  Sodium   Date Value Ref Range Status   06/26/2020 142 136 - 145 mmol/L Final     Potassium   Date Value Ref Range Status   06/26/2020 4.0 3.5 - 5.1 mmol/L Final     Chloride   Date Value Ref Range Status   06/26/2020 103 95 - 110 mmol/L Final     CO2   Date Value Ref Range Status   06/26/2020 32 (H) 23 - 29 mmol/L Final     BUN, Bld   Date Value Ref Range Status   06/26/2020 28 (H) 8 - 23 mg/dL Final     Creatinine   Date Value Ref Range Status   06/26/2020 1.6 (H) 0.5 - 1.4 mg/dL Final     Calcium   Date Value Ref Range Status   06/26/2020 9.4 8.7 - 10.5 mg/dL Final     Anion Gap   Date Value Ref Range Status   06/26/2020 7 (L) 8 - 16 mmol/L Final     eGFR if    Date Value Ref Range Status   06/26/2020 33 (A) >60 mL/min/1.73 m^2 Final     eGFR if non    Date Value Ref Range Status   06/26/2020 29 (A) >60 mL/min/1.73 m^2 Final     Comment:     Calculation used to obtain the estimated glomerular filtration  rate (eGFR) is the CKD-EPI equation.        BNP  @LABRCNTIP(BNP,BNPTRIAGEBLO)@  @LABRCNTIP(troponini)@  CrCl cannot be calculated (Patient's most recent lab result is older than the maximum 7  days allowed.).  No results found in the last 24 hours.  No results found in the last 24 hours.  No results found in the last 24 hours.    Assessment:      1. Paroxysmal atrial fibrillation    2. Chronic diastolic congestive heart failure    3. Essential hypertension    4. Mixed hyperlipidemia    5. Obesity (BMI 30.0-34.9)        Plan:   Continue Eliquis 2.5 mg bid amiodarone 100 mg qd, Losartan 25 mg bid, lopressor 12.5 mg bid, imdur 60 mg daily  Continue Clonidine prn if SBP > 160 mmHG  Counseled DASH  Check Lipid profile in 6 months  Recommend heart-healthy diet, weight control and regular exercise.  Shannen. Risk modification.   RTC in 6 months    I have reviewed all pertinent labs and cardiac studies independently. Plans and recommendations have been formulated under my direct supervision. All questions answered and patient voiced understanding.   If symptoms persist go to the ED

## 2020-08-31 ENCOUNTER — HOSPITAL ENCOUNTER (EMERGENCY)
Facility: HOSPITAL | Age: 85
Discharge: HOME | End: 2020-08-31
Attending: EMERGENCY MEDICINE
Payer: MEDICARE

## 2020-08-31 VITALS
BODY MASS INDEX: 29.34 KG/M2 | HEART RATE: 58 BPM | RESPIRATION RATE: 19 BRPM | TEMPERATURE: 98 F | HEIGHT: 64 IN | SYSTOLIC BLOOD PRESSURE: 143 MMHG | DIASTOLIC BLOOD PRESSURE: 69 MMHG | OXYGEN SATURATION: 96 % | WEIGHT: 171.88 LBS

## 2020-08-31 DIAGNOSIS — R07.9 CHEST PAIN: ICD-10-CM

## 2020-08-31 DIAGNOSIS — R00.2 PALPITATION: Primary | ICD-10-CM

## 2020-08-31 DIAGNOSIS — I10 HYPERTENSION, UNSPECIFIED TYPE: ICD-10-CM

## 2020-08-31 LAB
ALBUMIN SERPL BCP-MCNC: 3.8 G/DL (ref 3.5–5.2)
ALP SERPL-CCNC: 64 U/L (ref 55–135)
ALT SERPL W/O P-5'-P-CCNC: 18 U/L (ref 10–44)
ANION GAP SERPL CALC-SCNC: 14 MMOL/L (ref 8–16)
AST SERPL-CCNC: 36 U/L (ref 10–40)
BASOPHILS # BLD AUTO: 0.07 K/UL (ref 0–0.2)
BASOPHILS NFR BLD: 1 % (ref 0–1.9)
BILIRUB SERPL-MCNC: 0.3 MG/DL (ref 0.1–1)
BNP SERPL-MCNC: 98 PG/ML (ref 0–99)
BUN SERPL-MCNC: 38 MG/DL (ref 8–23)
CALCIUM SERPL-MCNC: 10 MG/DL (ref 8.7–10.5)
CHLORIDE SERPL-SCNC: 104 MMOL/L (ref 95–110)
CO2 SERPL-SCNC: 23 MMOL/L (ref 23–29)
CREAT SERPL-MCNC: 1.6 MG/DL (ref 0.5–1.4)
DIFFERENTIAL METHOD: ABNORMAL
EOSINOPHIL # BLD AUTO: 0.3 K/UL (ref 0–0.5)
EOSINOPHIL NFR BLD: 4.6 % (ref 0–8)
ERYTHROCYTE [DISTWIDTH] IN BLOOD BY AUTOMATED COUNT: 13.3 % (ref 11.5–14.5)
EST. GFR  (AFRICAN AMERICAN): 33 ML/MIN/1.73 M^2
EST. GFR  (NON AFRICAN AMERICAN): 29 ML/MIN/1.73 M^2
GLUCOSE SERPL-MCNC: 143 MG/DL (ref 70–110)
HCT VFR BLD AUTO: 40.3 % (ref 37–48.5)
HGB BLD-MCNC: 13 G/DL (ref 12–16)
IMM GRANULOCYTES # BLD AUTO: 0.02 K/UL (ref 0–0.04)
IMM GRANULOCYTES NFR BLD AUTO: 0.3 % (ref 0–0.5)
LYMPHOCYTES # BLD AUTO: 1.8 K/UL (ref 1–4.8)
LYMPHOCYTES NFR BLD: 26.7 % (ref 18–48)
MCH RBC QN AUTO: 32.6 PG (ref 27–31)
MCHC RBC AUTO-ENTMCNC: 32.3 G/DL (ref 32–36)
MCV RBC AUTO: 101 FL (ref 82–98)
MONOCYTES # BLD AUTO: 0.8 K/UL (ref 0.3–1)
MONOCYTES NFR BLD: 11.5 % (ref 4–15)
NEUTROPHILS # BLD AUTO: 3.7 K/UL (ref 1.8–7.7)
NEUTROPHILS NFR BLD: 55.9 % (ref 38–73)
NRBC BLD-RTO: 0 /100 WBC
PLATELET # BLD AUTO: 235 K/UL (ref 150–350)
PMV BLD AUTO: 9.9 FL (ref 9.2–12.9)
POTASSIUM SERPL-SCNC: 4.7 MMOL/L (ref 3.5–5.1)
PROT SERPL-MCNC: 8.2 G/DL (ref 6–8.4)
RBC # BLD AUTO: 3.99 M/UL (ref 4–5.4)
SARS-COV-2 RDRP RESP QL NAA+PROBE: NEGATIVE
SODIUM SERPL-SCNC: 141 MMOL/L (ref 136–145)
TROPONIN I SERPL DL<=0.01 NG/ML-MCNC: 0.04 NG/ML (ref 0–0.03)
WBC # BLD AUTO: 6.67 K/UL (ref 3.9–12.7)

## 2020-08-31 PROCEDURE — 84484 ASSAY OF TROPONIN QUANT: CPT | Mod: HCNC

## 2020-08-31 PROCEDURE — 36415 COLL VENOUS BLD VENIPUNCTURE: CPT | Mod: HCNC

## 2020-08-31 PROCEDURE — 83880 ASSAY OF NATRIURETIC PEPTIDE: CPT | Mod: HCNC

## 2020-08-31 PROCEDURE — 80053 COMPREHEN METABOLIC PANEL: CPT | Mod: HCNC

## 2020-08-31 PROCEDURE — 93005 ELECTROCARDIOGRAM TRACING: CPT | Mod: HCNC

## 2020-08-31 PROCEDURE — 93010 ELECTROCARDIOGRAM REPORT: CPT | Mod: HCNC,,, | Performed by: INTERNAL MEDICINE

## 2020-08-31 PROCEDURE — 36000 PLACE NEEDLE IN VEIN: CPT | Mod: HCNC

## 2020-08-31 PROCEDURE — 93010 EKG 12-LEAD: ICD-10-PCS | Mod: HCNC,,, | Performed by: INTERNAL MEDICINE

## 2020-08-31 PROCEDURE — U0002 COVID-19 LAB TEST NON-CDC: HCPCS | Mod: HCNC

## 2020-08-31 PROCEDURE — 85025 COMPLETE CBC W/AUTO DIFF WBC: CPT | Mod: HCNC

## 2020-08-31 PROCEDURE — 99285 EMERGENCY DEPT VISIT HI MDM: CPT | Mod: 25,HCNC

## 2020-09-01 ENCOUNTER — PES CALL (OUTPATIENT)
Dept: ADMINISTRATIVE | Facility: CLINIC | Age: 85
End: 2020-09-01

## 2020-09-01 NOTE — ED PROVIDER NOTES
SCRIBE #1 NOTE: I, Artur Thornton, am scribing for, and in the presence of, Juan Leroy Do, MD. I have scribed the entire note.       History     Chief Complaint   Patient presents with    Hypertension     States elevated BP today, took Catapress and feels like it is going down.    Palpitations     States palpitations associated with her high BP.     Review of patient's allergies indicates:   Allergen Reactions    Claritin [loratadine] Other (See Comments)     Double vision/spots    Hydrocodone-acetaminophen      wheezing    Labetalol Shortness Of Breath, Nausea Only and Other (See Comments)     Palpitations, LE weakness    Naproxen      wheezing    Verapamil Diarrhea    Vibramycin [doxycycline calcium] Other (See Comments)     Weakness nausea   sob    Amlodipine      Myalgias      Coreg [carvedilol] Swelling     lips    Fenofibrate      Causes muscle weakness    Hydralazine analogues      Muscle tremors/aches      Isosorbide     Lasix [furosemide]      myalgias    Moxifloxacin Other (See Comments)     Hives   muscle soreness    Timolol     Adhesive Rash     Clonidine patch - 2 mfg - contact dermatitis    Allegra [fexofenadine] Other (See Comments)     Double vision/spots     Codeine Diarrhea and Other (See Comments)     Loss of balance     Doxazosin Palpitations    Erythromycin Other (See Comments)     Complete weakness/could not walk    Hydrocortisone (bulk) Other (See Comments)     Only suppository/ caused muscle weakness    Macrolide antibiotics Other (See Comments)     Complete weakness/could not walk    Patanol [olopatadine] Other (See Comments)     Muscle weakness    Prednisone Anxiety    Statins-hmg-coa reductase inhibitors Other (See Comments)     Muscle weakness    Xalatan [latanoprost] Other (See Comments)     Muscle weakness         History of Present Illness     [facility]    8/31/2020, 9:04 PM  History obtained from the patient      History of Present Illness: Shirley Bettencourt  "Isaías is a 87 y.o. female patient with a PMHx of A fib who presents to the Emergency Department for evaluation of HTN and palpitations which onset today. Pt reports her BP this morning was 135/65 before it dean to 215/78. Pt reports that at times, "I can feel my heart beat." Symptoms are intermittent and moderate in severity. No mitigating or exacerbating factors reported. Associated sxs include HA. Patient denies any CP, SOB, fever, chills, n/v/d, abd pain, and all other sxs at this time. Prior Tx includes clonidine at 7:10 PM with mild relief. Pt saw her cardiologist 2 weeks ago. No further complaints or concerns at this time.       Arrival mode: Personal vehicle    PCP: Yandy Terrell MD        Past Medical History:  Past Medical History:   Diagnosis Date    Anemia 11/29/2018    Anxiety 11/28/2017    Arthritis     Atrial fibrillation with RVR 10/9/2019    Back pain     Basal cell carcinoma 03/29/2018    left mid back    Chronic diastolic congestive heart failure 10/15/2019    Colon polyps     reported per patient.     Fuchs' corneal dystrophy     General anesthetics causing adverse effect in therapeutic use     woke up during surgery and gagged on ET tube    Glaucoma     Glaucoma     Heart failure with preserved left ventricular function (HFpEF) 7/24/2018    Hyperlipidemia     Hypertension     Macular degeneration dry    Obesity     Squamous cell carcinoma 01/08/2019    left shoulder    Trouble in sleeping     Urinary tract infection        Past Surgical History:  Past Surgical History:   Procedure Laterality Date    ADENOIDECTOMY  1938    BREAST BIOPSY Left     1997. benign    BREAST CYST EXCISION Left 1997 approx    CARPAL TUNNEL RELEASE Right 2012 approx    CATARACT EXTRACTION Bilateral 1994    CHOLECYSTECTOMY  1975    CORNEAL TRANSPLANT Bilateral 08/2015 8/2015 - left; Right 4/2016    EYE SURGERY      Fuch's Corneal dystrophy - had 2nd operation with Dr. Quintanilla    EYE " SURGERY      Cataract wIOL    left thumb Left 2011    removed cuboid for arthritis    REVERSE TOTAL SHOULDER ARTHROPLASTY Left 8/21/2018    Procedure: ARTHROPLASTY, SHOULDER, TOTAL, REVERSE;  Surgeon: Solomon Lujan MD;  Location: [facility] OR;  Service: Orthopedics;  Laterality: Left;  WITH BICEP TENODESIS    SKIN CANCER EXCISION Left 2017    BCC removal by Dr. Valadez    SLT- OS (aka TRABECULOPLASTY) Left 05/11/2011    repeat 3/14/12    TENOTOMY Left 8/21/2018    Procedure: TENOTOMY;  Surgeon: Solomon Lujan MD;  Location: [facility] OR;  Service: Orthopedics;  Laterality: Left;    TONSILLECTOMY  1938    TREATMENT OF CARDIAC ARRHYTHMIA N/A 10/10/2019    Procedure: CARDIOVERSION;  Surgeon: Pete Durán MD;  Location: [facility] CATH LAB;  Service: Cardiology;  Laterality: N/A;    TREATMENT OF CARDIAC ARRHYTHMIA N/A 12/6/2019    Procedure: CARDIOVERSION;  Surgeon: Samy Castillo MD;  Location: [facility] CATH LAB;  Service: Cardiology;  Laterality: N/A;         Family History:  Family History   Problem Relation Age of Onset    Heart disease Mother     Hypertension Mother     Cancer Father 88        sarcoma( ?Kaposi's ) on leg    Deep vein thrombosis Neg Hx     Ovarian cancer Neg Hx     Breast cancer Neg Hx     Kidney disease Neg Hx     Strabismus Neg Hx     Retinal detachment Neg Hx     Macular degeneration Neg Hx     Glaucoma Neg Hx     Blindness Neg Hx     Amblyopia Neg Hx     Eczema Neg Hx     Lupus Neg Hx     Melanoma Neg Hx     Psoriasis Neg Hx     Diabetes Neg Hx     Stroke Neg Hx     Mental retardation Neg Hx     Mental illness Neg Hx     Hyperlipidemia Neg Hx     COPD Neg Hx     Asthma Neg Hx     Depression Neg Hx     Alcohol abuse Neg Hx     Drug abuse Neg Hx        Social History:  Social History     Tobacco Use    Smoking status: Never Smoker    Smokeless tobacco: Never Used    Tobacco comment: history of passive smoking from her ex-   Substance and Sexual Activity    Alcohol  use: Not Currently     Alcohol/week: 2.0 standard drinks     Types: 2 Standard drinks or equivalent per week     Frequency: Monthly or less     Drinks per session: 1 or 2     Binge frequency: Never     Comment: occasional     Drug use: No    Sexual activity: Not Currently     Partners: Male     Birth control/protection: Post-menopausal     Comment: discussed protection for STD's        Review of Systems     Review of Systems   Constitutional: Negative for activity change, appetite change, chills, diaphoresis and fever.   HENT: Negative for congestion, drooling, ear pain, mouth sores, rhinorrhea, sinus pain, sore throat and trouble swallowing.    Eyes: Negative for pain and discharge.   Respiratory: Negative for cough, chest tightness, shortness of breath, wheezing and stridor.    Cardiovascular: Positive for palpitations. Negative for chest pain and leg swelling.        (+) HTN   Gastrointestinal: Negative for abdominal distention, abdominal pain, blood in stool, constipation, diarrhea, nausea and vomiting.   Genitourinary: Negative for difficulty urinating, dysuria, flank pain, frequency, hematuria and urgency.   Musculoskeletal: Negative for arthralgias, back pain and myalgias.   Skin: Negative for pallor, rash and wound.   Neurological: Positive for headaches. Negative for dizziness, syncope, weakness, light-headedness and numbness.   All other systems reviewed and are negative.       Physical Exam     Initial Vitals [08/31/20 2035]   BP Pulse Resp Temp SpO2   (!) 177/75 71 18 98.1 °F (36.7 °C) 95 %      MAP       --          Physical Exam  Nursing Notes and Vital Signs Reviewed.  Constitutional: Patient is in no acute distress. Elderly.  Head: Atraumatic. Normocephalic.  Eyes: PERRL. EOM intact.  ENT: Mucous membranes are moist. Oropharynx is clear and symmetric.    Neck: Supple. Full ROM. No lymphadenopathy.  Cardiovascular: Regular rate. Regular rhythm. No murmurs, rubs, or gallops. Distal pulses are 2+ and  "symmetric.  Pulmonary/Chest: No respiratory distress. Clear to auscultation bilaterally. No wheezing or rales.  Abdominal: Soft and non-distended.  There is no tenderness.  No rebound, guarding, or rigidity. Good bowel sounds.  Genitourinary: No CVA tenderness  Musculoskeletal: Moves all extremities. No obvious deformities. No pitting edema. No calf tenderness.  Skin: Warm and dry.  Neurological:  Alert, awake, and appropriate.  Normal speech.  No acute focal neurological deficits are appreciated.  Psychiatric: Normal affect. Good eye contact. Appropriate in content.     ED Course   Procedures  ED Vital Signs:  Vitals:    08/31/20 2035 08/31/20 2102 08/31/20 2108 08/31/20 2133   BP: (!) 177/75 (!) 163/71     Pulse: 71 63 65 (!) 59   Resp: 18   20   Temp: 98.1 °F (36.7 °C)      TempSrc: Oral      SpO2: 95% 98%  96%   Weight: 78 kg (171 lb 13.6 oz)      Height: 5' 4" (1.626 m)       08/31/20 2202 08/31/20 2232   BP: (!) 140/55 (!) 158/67   Pulse: (!) 58 (!) 58   Resp: 20 19   Temp:     TempSrc:     SpO2: 97% 96%   Weight:     Height:         Abnormal Lab Results:  Labs Reviewed   CBC W/ AUTO DIFFERENTIAL - Abnormal; Notable for the following components:       Result Value    RBC 3.99 (*)     Mean Corpuscular Volume 101 (*)     Mean Corpuscular Hemoglobin 32.6 (*)     All other components within normal limits   COMPREHENSIVE METABOLIC PANEL - Abnormal; Notable for the following components:    Glucose 143 (*)     BUN, Bld 38 (*)     Creatinine 1.6 (*)     eGFR if  33 (*)     eGFR if non  29 (*)     All other components within normal limits   TROPONIN I - Abnormal; Notable for the following components:    Troponin I 0.036 (*)     All other components within normal limits   B-TYPE NATRIURETIC PEPTIDE   SARS-COV-2 RNA AMPLIFICATION, QUAL        All Lab Results:  Results for orders placed or performed during the hospital encounter of 08/31/20   CBC auto differential   Result Value Ref Range "    WBC 6.67 3.90 - 12.70 K/uL    RBC 3.99 (L) 4.00 - 5.40 M/uL    Hemoglobin 13.0 12.0 - 16.0 g/dL    Hematocrit 40.3 37.0 - 48.5 %    Mean Corpuscular Volume 101 (H) 82 - 98 fL    Mean Corpuscular Hemoglobin 32.6 (H) 27.0 - 31.0 pg    Mean Corpuscular Hemoglobin Conc 32.3 32.0 - 36.0 g/dL    RDW 13.3 11.5 - 14.5 %    Platelets 235 150 - 350 K/uL    MPV 9.9 9.2 - 12.9 fL    Immature Granulocytes 0.3 0.0 - 0.5 %    Gran # (ANC) 3.7 1.8 - 7.7 K/uL    Immature Grans (Abs) 0.02 0.00 - 0.04 K/uL    Lymph # 1.8 1.0 - 4.8 K/uL    Mono # 0.8 0.3 - 1.0 K/uL    Eos # 0.3 0.0 - 0.5 K/uL    Baso # 0.07 0.00 - 0.20 K/uL    nRBC 0 0 /100 WBC    Gran% 55.9 38.0 - 73.0 %    Lymph% 26.7 18.0 - 48.0 %    Mono% 11.5 4.0 - 15.0 %    Eosinophil% 4.6 0.0 - 8.0 %    Basophil% 1.0 0.0 - 1.9 %    Differential Method Automated    Comprehensive metabolic panel   Result Value Ref Range    Sodium 141 136 - 145 mmol/L    Potassium 4.7 3.5 - 5.1 mmol/L    Chloride 104 95 - 110 mmol/L    CO2 23 23 - 29 mmol/L    Glucose 143 (H) 70 - 110 mg/dL    BUN, Bld 38 (H) 8 - 23 mg/dL    Creatinine 1.6 (H) 0.5 - 1.4 mg/dL    Calcium 10.0 8.7 - 10.5 mg/dL    Total Protein 8.2 6.0 - 8.4 g/dL    Albumin 3.8 3.5 - 5.2 g/dL    Total Bilirubin 0.3 0.1 - 1.0 mg/dL    Alkaline Phosphatase 64 55 - 135 U/L    AST 36 10 - 40 U/L    ALT 18 10 - 44 U/L    Anion Gap 14 8 - 16 mmol/L    eGFR if African American 33 (A) >60 mL/min/1.73 m^2    eGFR if non African American 29 (A) >60 mL/min/1.73 m^2   Troponin I #1   Result Value Ref Range    Troponin I 0.036 (H) 0.000 - 0.026 ng/mL   B-Type natriuretic peptide (BNP)   Result Value Ref Range    BNP 98 0 - 99 pg/mL   COVID-19 Rapid Screening   Result Value Ref Range    SARS-CoV-2 RNA, Amplification, Qual Negative Negative     *Note: Due to a large number of results and/or encounters for the requested time period, some results have not been displayed. A complete set of results can be found in Results Review.         Imaging  Results:  Imaging Results          X-Ray Chest AP Portable (Final result)  Result time 08/31/20 21:40:12    Final result by PAXTON Burger Sr., MD (08/31/20 21:40:12)                 Impression:      1. There is a mild amount of haziness in the base of the right lung.  This is characteristic of atelectasis or pneumonia.  2. The borders of the heart are not well seen.  This is characteristic of pericardial fat pads.  3. There is prosthetic hardware in the left shoulder.  .      Electronically signed by: Amish Burger MD  Date:    08/31/2020  Time:    21:40             Narrative:    EXAMINATION:  XR CHEST AP PORTABLE    CLINICAL HISTORY:  Chest Pain;    COMPARISON:  02/19/2020    FINDINGS:  The borders of the heart are not well seen.  There is a mild amount of haziness in the base of the right lung.  There is no focal pulmonary infiltrate visualized in the left lung.  There is no pneumothorax.  The costophrenic angles are sharp.  There is prosthetic hardware in the left shoulder.                                 The EKG was ordered, reviewed, and independently interpreted by the ED provider.  Interpretation time: 20:50  Rate: 68 BPM  Rhythm: normal sinus rhythm  Interpretation: Incomplete RBBB. Nonspecific ST abnormality. No STEMI.           The Emergency Provider reviewed the vital signs and test results, which are outlined above.     ED Discussion       11:13 PM: Reassessed pt at this time.  Pt states her condition has improved at this time. Discussed with pt all pertinent ED information and results. Discussed pt dx and plan of tx. Gave pt all f/u and return to the ED instructions. All questions and concerns were addressed at this time. Pt expresses understanding of information and instructions, and is comfortable with plan to discharge. Pt is stable for discharge.     I discussed with patient and/or family/caretaker that evaluation in the ED does not suggest any emergent or life threatening medical conditions  requiring immediate intervention beyond what was provided in the ED, and I believe patient is safe for discharge.  Regardless, an unremarkable evaluation in the ED does not preclude the development or presence of a serious of life threatening condition. As such, patient was instructed to return immediately for any worsening or change in current symptoms.    I will forward pt's chart to cardiology so they may monitor palpitations.         Medical Decision Making:   Clinical Tests:   Lab Tests: Ordered and Reviewed  Radiological Study: Ordered and Reviewed  Medical Tests: Ordered and Reviewed           ED Medication(s):  Medications - No data to display    New Prescriptions    No medications on file       Follow-up Information     Samy MD Jonathan In 2 days.    Specialties: Cardiology, Cardiovascular Disease  Contact information:  [address] [facility] [address] [phone]                       Scribe Attestation:   Scribe #1: I performed the above scribed service and the documentation accurately describes the services I performed. I attest to the accuracy of the note.     Attending:   Physician Attestation Statement for Scribe #1: I, Juan Leroy Do, MD, personally performed the services described in this documentation, as scribed by Artur Thornton, in my presence, and it is both accurate and complete.           Clinical Impression       ICD-10-CM ICD-9-CM   1. Palpitation  R00.2 785.1   2. Chest pain  R07.9 786.50   3. Hypertension, unspecified type  I10 401.9       Disposition:   Disposition: Discharged  Condition: Stable         Juan Leroy Do, MD  09/01/20 0056

## 2020-09-03 ENCOUNTER — PATIENT OUTREACH (OUTPATIENT)
Dept: ADMINISTRATIVE | Facility: OTHER | Age: 85
End: 2020-09-03

## 2020-09-04 ENCOUNTER — OFFICE VISIT (OUTPATIENT)
Dept: CARDIOLOGY | Facility: CLINIC | Age: 85
End: 2020-09-04
Payer: MEDICARE

## 2020-09-04 VITALS
BODY MASS INDEX: 29.55 KG/M2 | SYSTOLIC BLOOD PRESSURE: 148 MMHG | WEIGHT: 172.19 LBS | OXYGEN SATURATION: 96 % | HEART RATE: 59 BPM | DIASTOLIC BLOOD PRESSURE: 80 MMHG

## 2020-09-04 DIAGNOSIS — I10 ESSENTIAL HYPERTENSION: Primary | Chronic | ICD-10-CM

## 2020-09-04 DIAGNOSIS — I49.3 PVC'S (PREMATURE VENTRICULAR CONTRACTIONS): ICD-10-CM

## 2020-09-04 DIAGNOSIS — I48.0 PAROXYSMAL ATRIAL FIBRILLATION: ICD-10-CM

## 2020-09-04 DIAGNOSIS — I50.32 CHRONIC DIASTOLIC CONGESTIVE HEART FAILURE: ICD-10-CM

## 2020-09-04 PROCEDURE — 1126F AMNT PAIN NOTED NONE PRSNT: CPT | Mod: HCNC,S$GLB,, | Performed by: INTERNAL MEDICINE

## 2020-09-04 PROCEDURE — 99499 UNLISTED E&M SERVICE: CPT | Mod: HCNC,S$GLB,, | Performed by: INTERNAL MEDICINE

## 2020-09-04 PROCEDURE — 1101F PR PT FALLS ASSESS DOC 0-1 FALLS W/OUT INJ PAST YR: ICD-10-PCS | Mod: HCNC,CPTII,S$GLB, | Performed by: INTERNAL MEDICINE

## 2020-09-04 PROCEDURE — 1159F PR MEDICATION LIST DOCUMENTED IN MEDICAL RECORD: ICD-10-PCS | Mod: HCNC,S$GLB,, | Performed by: INTERNAL MEDICINE

## 2020-09-04 PROCEDURE — 99214 PR OFFICE/OUTPT VISIT, EST, LEVL IV, 30-39 MIN: ICD-10-PCS | Mod: HCNC,S$GLB,, | Performed by: INTERNAL MEDICINE

## 2020-09-04 PROCEDURE — 1159F MED LIST DOCD IN RCRD: CPT | Mod: HCNC,S$GLB,, | Performed by: INTERNAL MEDICINE

## 2020-09-04 PROCEDURE — 1126F PR PAIN SEVERITY QUANTIFIED, NO PAIN PRESENT: ICD-10-PCS | Mod: HCNC,S$GLB,, | Performed by: INTERNAL MEDICINE

## 2020-09-04 PROCEDURE — 99499 RISK ADDL DX/OHS AUDIT: ICD-10-PCS | Mod: HCNC,S$GLB,, | Performed by: INTERNAL MEDICINE

## 2020-09-04 PROCEDURE — 99999 PR PBB SHADOW E&M-EST. PATIENT-LVL IV: ICD-10-PCS | Mod: PBBFAC,HCNC,, | Performed by: INTERNAL MEDICINE

## 2020-09-04 PROCEDURE — 99999 PR PBB SHADOW E&M-EST. PATIENT-LVL IV: CPT | Mod: PBBFAC,HCNC,, | Performed by: INTERNAL MEDICINE

## 2020-09-04 PROCEDURE — 99214 OFFICE O/P EST MOD 30 MIN: CPT | Mod: HCNC,S$GLB,, | Performed by: INTERNAL MEDICINE

## 2020-09-04 PROCEDURE — 1101F PT FALLS ASSESS-DOCD LE1/YR: CPT | Mod: HCNC,CPTII,S$GLB, | Performed by: INTERNAL MEDICINE

## 2020-09-04 NOTE — PROGRESS NOTES
Subjective:   Patient ID:  Shirley Metz is a 87 y.o. female who presents for follow up of No chief complaint on file.      88 yo female. ER visit f/u  PMH pafib, + positive SELVIN, HTN, HLD chfpEF and CKD, s/p corneal Tx   echo showed EF 60%. LVH   Nulcear stress test no ischemia   LUM arterial and carotid US showed mild Dz.   10/07 saw pt in the office for C/o recent weakness,  stomach upset,. Has fast pulse. Some dizziness, and Monet. ekg showed new onset afib with RVR. Increased Mwetoprolol to 100 mg bid and add cardizem and eliquis 2.5 m bid.  [facility]09, [facility]. Troponin 0.035 chronic elevation. Afib with RVR  . Echo showed normal EF and mild MR> Next day, STACEY showed + SELVIN thrombus and DCCV cancelled. Continued Eliquis  12/07 repeated STACEY no SELVIN thrombus and DCCV x1 converted to sinus   went to ER due to severe left arm pain. Troponin 0.03 and lung perfusion scan negative for PE  Pt has had multiple allergies to HTN medication and multiple ER visits for uncontrolled HTN.   In the past 3 moths, felt better. Walks longer, more energy and less MONET. Sleeps with two pillows. Improved headache and blurred vision. Occasional skin ictching.   SBP high in  to 150 mmHG. Sometime 140 mmHG   Pt Her pulse was at 45 to 55 bpm.   Continue Losartan 100 mg daily  Daily taking clonidine 1 mg daily  Continue Metoprolol 25 mg bid  In the past few weeks the BP controlled and pt feels better    Went to ER on 09/01 for high BP and palpitation. She had two days of anxiety and could not sleep well. She worried she may develop the afib. EKG in ER NSR and Lab normal BNP slight elevation of troponin chronically.   Today reviewed BP log controlled,             Past Medical History:   Diagnosis Date    Anemia 11/29/2018    Anxiety 11/28/2017    Arthritis     Atrial fibrillation with RVR 10/9/2019    Back pain     Basal cell carcinoma 03/29/2018    left mid back     Chronic diastolic congestive heart failure 10/15/2019    Colon polyps     reported per patient.     Fuchs' corneal dystrophy     General anesthetics causing adverse effect in therapeutic use     woke up during surgery and gagged on ET tube    Glaucoma     Glaucoma     Heart failure with preserved left ventricular function (HFpEF) 7/24/2018    Hyperlipidemia     Hypertension     Macular degeneration dry    Obesity     Squamous cell carcinoma 01/08/2019    left shoulder    Trouble in sleeping     Urinary tract infection        Past Surgical History:   Procedure Laterality Date    ADENOIDECTOMY  1938    BREAST BIOPSY Left     1997. benign    BREAST CYST EXCISION Left 1997 approx    CARPAL TUNNEL RELEASE Right 2012 approx    CATARACT EXTRACTION Bilateral 1994    CHOLECYSTECTOMY  1975    CORNEAL TRANSPLANT Bilateral 08/2015 8/2015 - left; Right 4/2016    EYE SURGERY      Fuch's Corneal dystrophy - had 2nd operation with Dr. Quintanilla    EYE SURGERY      Cataract wIOL    left thumb Left 2011    removed cuboid for arthritis    REVERSE TOTAL SHOULDER ARTHROPLASTY Left 8/21/2018    Procedure: ARTHROPLASTY, SHOULDER, TOTAL, REVERSE;  Surgeon: Solomon Lujan MD;  Location: [facility] OR;  Service: Orthopedics;  Laterality: Left;  WITH BICEP TENODESIS    SKIN CANCER EXCISION Left 2017    BCC removal by Dr. Valadez    SLT- OS (aka TRABECULOPLASTY) Left 05/11/2011    repeat 3/14/12    TENOTOMY Left 8/21/2018    Procedure: TENOTOMY;  Surgeon: Solomon Lujan MD;  Location: [facility] OR;  Service: Orthopedics;  Laterality: Left;    TONSILLECTOMY  1938    TREATMENT OF CARDIAC ARRHYTHMIA N/A 10/10/2019    Procedure: CARDIOVERSION;  Surgeon: Pete Durán MD;  Location: [facility] CATH LAB;  Service: Cardiology;  Laterality: N/A;    TREATMENT OF CARDIAC ARRHYTHMIA N/A 12/6/2019    Procedure: CARDIOVERSION;  Surgeon: Samy Castillo MD;  Location: [facility] CATH LAB;  Service: Cardiology;  Laterality: N/A;       Social  History     Tobacco Use    Smoking status: Never Smoker    Smokeless tobacco: Never Used    Tobacco comment: history of passive smoking from her ex-   Substance Use Topics    Alcohol use: Not Currently     Alcohol/week: 2.0 standard drinks     Types: 2 Standard drinks or equivalent per week     Frequency: Monthly or less     Drinks per session: 1 or 2     Binge frequency: Never     Comment: occasional     Drug use: No       Family History   Problem Relation Age of Onset    Heart disease Mother     Hypertension Mother     Cancer Father 88        sarcoma( ?Kaposi's ) on leg    Deep vein thrombosis Neg Hx     Ovarian cancer Neg Hx     Breast cancer Neg Hx     Kidney disease Neg Hx     Strabismus Neg Hx     Retinal detachment Neg Hx     Macular degeneration Neg Hx     Glaucoma Neg Hx     Blindness Neg Hx     Amblyopia Neg Hx     Eczema Neg Hx     Lupus Neg Hx     Melanoma Neg Hx     Psoriasis Neg Hx     Diabetes Neg Hx     Stroke Neg Hx     Mental retardation Neg Hx     Mental illness Neg Hx     Hyperlipidemia Neg Hx     COPD Neg Hx     Asthma Neg Hx     Depression Neg Hx     Alcohol abuse Neg Hx     Drug abuse Neg Hx          Review of Systems   Constitution: Positive for malaise/fatigue and weight loss. Negative for decreased appetite, diaphoresis, fever and night sweats.   HENT: Negative for nosebleeds.    Eyes: Negative for blurred vision and double vision.   Cardiovascular: Positive for palpitations. Negative for chest pain, claudication, irregular heartbeat, leg swelling, near-syncope, orthopnea, paroxysmal nocturnal dyspnea and syncope.   Respiratory: Negative for cough, shortness of breath, sleep disturbances due to breathing, snoring, sputum production and wheezing.    Endocrine: Negative for cold intolerance and polyuria.   Hematologic/Lymphatic: Does not bruise/bleed easily.   Skin: Negative for rash.   Musculoskeletal: Negative for back pain, falls, joint pain, joint  swelling and neck pain.   Gastrointestinal: Positive for abdominal pain. Negative for heartburn, nausea and vomiting.   Genitourinary: Negative for dysuria, frequency and hematuria.   Neurological: Negative for difficulty with concentration, dizziness, focal weakness, headaches, light-headedness, numbness, seizures and weakness.   Psychiatric/Behavioral: Negative for depression, memory loss and substance abuse. The patient does not have insomnia.    Allergic/Immunologic: Negative for HIV exposure and hives.       Objective:   Physical Exam   Constitutional: She is oriented to person, place, and time. She appears well-nourished.   HENT:   Head: Normocephalic.   Eyes: Pupils are equal, round, and reactive to light.   Neck: Normal carotid pulses and no JVD present. Carotid bruit is not present. No thyromegaly present.   Cardiovascular: Normal rate, regular rhythm, normal heart sounds and normal pulses.  No extrasystoles are present. PMI is not displaced. Exam reveals no gallop and no S3.   No murmur heard.  Pulmonary/Chest: Breath sounds normal. No stridor. No respiratory distress.   Abdominal: Soft. Bowel sounds are normal. There is no abdominal tenderness. There is no rebound.   Musculoskeletal: Normal range of motion.   Neurological: She is alert and oriented to person, place, and time.   Skin: Skin is intact. No rash noted.   Psychiatric: Her behavior is normal.       Lab Results   Component Value Date    CHOL 233 (H) 08/01/2019    CHOL 180 03/16/2018    CHOL 181 02/19/2018     Lab Results   Component Value Date    HDL 49 08/01/2019    HDL 43 03/16/2018    HDL 45 02/19/2018     Lab Results   Component Value Date    LDLCALC 155.0 08/01/2019    LDLCALC 117.6 03/16/2018    LDLCALC 118.2 02/19/2018     Lab Results   Component Value Date    TRIG 145 08/01/2019    TRIG 97 03/16/2018    TRIG 89 02/19/2018     Lab Results   Component Value Date    CHOLHDL 21.0 08/01/2019    CHOLHDL 23.9 03/16/2018    CHOLHDL 24.9  02/19/2018       Chemistry        Component Value Date/Time     08/31/2020 2101    K 4.7 08/31/2020 2101     08/31/2020 2101    CO2 23 08/31/2020 2101    BUN 38 (H) 08/31/2020 2101    CREATININE 1.6 (H) 08/31/2020 2101     (H) 08/31/2020 2101        Component Value Date/Time    CALCIUM 10.0 08/31/2020 2101    ALKPHOS 64 08/31/2020 2101    AST 36 08/31/2020 2101    ALT 18 08/31/2020 2101    BILITOT 0.3 08/31/2020 2101    ESTGFRAFRICA 33 (A) 08/31/2020 2101    EGFRNONAA 29 (A) 08/31/2020 2101          Lab Results   Component Value Date    HGBA1C 6.4 (H) 01/08/2020     Lab Results   Component Value Date    TSH 0.849 10/09/2019     Lab Results   Component Value Date    INR 1.0 02/12/2020    INR 1.0 01/25/2018    INR 1.0 01/01/2017     Lab Results   Component Value Date    WBC 6.67 08/31/2020    HGB 13.0 08/31/2020    HCT 40.3 08/31/2020     (H) 08/31/2020     08/31/2020     BMP  Sodium   Date Value Ref Range Status   08/31/2020 141 136 - 145 mmol/L Final     Potassium   Date Value Ref Range Status   08/31/2020 4.7 3.5 - 5.1 mmol/L Final     Chloride   Date Value Ref Range Status   08/31/2020 104 95 - 110 mmol/L Final     CO2   Date Value Ref Range Status   08/31/2020 23 23 - 29 mmol/L Final     BUN, Bld   Date Value Ref Range Status   08/31/2020 38 (H) 8 - 23 mg/dL Final     Creatinine   Date Value Ref Range Status   08/31/2020 1.6 (H) 0.5 - 1.4 mg/dL Final     Calcium   Date Value Ref Range Status   08/31/2020 10.0 8.7 - 10.5 mg/dL Final     Anion Gap   Date Value Ref Range Status   08/31/2020 14 8 - 16 mmol/L Final     eGFR if    Date Value Ref Range Status   08/31/2020 33 (A) >60 mL/min/1.73 m^2 Final     eGFR if non    Date Value Ref Range Status   08/31/2020 29 (A) >60 mL/min/1.73 m^2 Final     Comment:     Calculation used to obtain the estimated glomerular filtration  rate (eGFR) is the CKD-EPI equation.         BNP  @LABRCNTIP(BNP,BNPTRIAGEBLO)@  @LABRCNTIP(troponini)@  Estimated Creatinine Clearance: 25.1 mL/min (A) (based on SCr of 1.6 mg/dL (H)).  No results found in the last 24 hours.  No results found in the last 24 hours.  No results found in the last 24 hours.    Assessment:      1. Essential hypertension    2. Chronic diastolic congestive heart failure    3. Paroxysmal atrial fibrillation    4. PVC's (premature ventricular contractions)        Plan:   Continue Eliquis 2.5 mg bid amiodarone 100 mg qd, Losartan 25 mg bid, lopressor 12.5 mg bid, imdur 60 mg daily and HCTZ 25 mg  Continue Clonidine prn if SBP > 160 mmHG  DM Rx per PCP  Counseled DASH  Check Lipid profile in 6 months  Recommend heart-healthy diet, weight control and regular exercise.  Shannen. Risk modification.   RTC in 3 months    I have reviewed all pertinent labs and cardiac studies independently. Plans and recommendations have been formulated under my direct supervision. All questions answered and patient voiced understanding.   If symptoms persist go to the ED

## 2020-09-18 ENCOUNTER — LAB VISIT (OUTPATIENT)
Dept: LAB | Facility: HOSPITAL | Age: 85
End: 2020-09-18
Attending: INTERNAL MEDICINE
Payer: MEDICARE

## 2020-09-18 DIAGNOSIS — R73.03 PRE-DIABETES: ICD-10-CM

## 2020-09-18 DIAGNOSIS — I10 ESSENTIAL HYPERTENSION: Chronic | ICD-10-CM

## 2020-09-18 LAB
ALBUMIN SERPL BCP-MCNC: 3.6 G/DL (ref 3.5–5.2)
ALBUMIN SERPL BCP-MCNC: 3.6 G/DL (ref 3.5–5.2)
ALP SERPL-CCNC: 59 U/L (ref 55–135)
ALT SERPL W/O P-5'-P-CCNC: 16 U/L (ref 10–44)
ANION GAP SERPL CALC-SCNC: 10 MMOL/L (ref 8–16)
AST SERPL-CCNC: 20 U/L (ref 10–40)
BASOPHILS # BLD AUTO: 0.05 K/UL (ref 0–0.2)
BASOPHILS NFR BLD: 0.8 % (ref 0–1.9)
BILIRUB DIRECT SERPL-MCNC: 0.2 MG/DL (ref 0.1–0.3)
BILIRUB SERPL-MCNC: 0.6 MG/DL (ref 0.1–1)
BUN SERPL-MCNC: 24 MG/DL (ref 8–23)
CALCIUM SERPL-MCNC: 9.4 MG/DL (ref 8.7–10.5)
CHLORIDE SERPL-SCNC: 101 MMOL/L (ref 95–110)
CO2 SERPL-SCNC: 29 MMOL/L (ref 23–29)
CREAT SERPL-MCNC: 1.7 MG/DL (ref 0.5–1.4)
DIFFERENTIAL METHOD: ABNORMAL
EOSINOPHIL # BLD AUTO: 0.3 K/UL (ref 0–0.5)
EOSINOPHIL NFR BLD: 4.4 % (ref 0–8)
ERYTHROCYTE [DISTWIDTH] IN BLOOD BY AUTOMATED COUNT: 13.5 % (ref 11.5–14.5)
EST. GFR  (AFRICAN AMERICAN): 31 ML/MIN/1.73 M^2
EST. GFR  (NON AFRICAN AMERICAN): 27 ML/MIN/1.73 M^2
ESTIMATED AVG GLUCOSE: 123 MG/DL (ref 68–131)
GLUCOSE SERPL-MCNC: 95 MG/DL (ref 70–110)
HBA1C MFR BLD HPLC: 5.9 % (ref 4–5.6)
HCT VFR BLD AUTO: 36.7 % (ref 37–48.5)
HGB BLD-MCNC: 12 G/DL (ref 12–16)
IMM GRANULOCYTES # BLD AUTO: 0.03 K/UL (ref 0–0.04)
IMM GRANULOCYTES NFR BLD AUTO: 0.5 % (ref 0–0.5)
LYMPHOCYTES # BLD AUTO: 1.4 K/UL (ref 1–4.8)
LYMPHOCYTES NFR BLD: 21.8 % (ref 18–48)
MCH RBC QN AUTO: 32.7 PG (ref 27–31)
MCHC RBC AUTO-ENTMCNC: 32.7 G/DL (ref 32–36)
MCV RBC AUTO: 100 FL (ref 82–98)
MONOCYTES # BLD AUTO: 0.6 K/UL (ref 0.3–1)
MONOCYTES NFR BLD: 9.5 % (ref 4–15)
NEUTROPHILS # BLD AUTO: 4.1 K/UL (ref 1.8–7.7)
NEUTROPHILS NFR BLD: 63 % (ref 38–73)
NRBC BLD-RTO: 0 /100 WBC
PHOSPHATE SERPL-MCNC: 3.9 MG/DL (ref 2.7–4.5)
PLATELET # BLD AUTO: 210 K/UL (ref 150–350)
PMV BLD AUTO: 9 FL (ref 9.2–12.9)
POTASSIUM SERPL-SCNC: 4 MMOL/L (ref 3.5–5.1)
PROT SERPL-MCNC: 7.1 G/DL (ref 6–8.4)
PTH-INTACT SERPL-MCNC: 76.8 PG/ML (ref 9–77)
RBC # BLD AUTO: 3.67 M/UL (ref 4–5.4)
SODIUM SERPL-SCNC: 140 MMOL/L (ref 136–145)
WBC # BLD AUTO: 6.43 K/UL (ref 3.9–12.7)

## 2020-09-18 PROCEDURE — 83970 ASSAY OF PARATHORMONE: CPT | Mod: HCNC

## 2020-09-18 PROCEDURE — 36415 COLL VENOUS BLD VENIPUNCTURE: CPT | Mod: HCNC

## 2020-09-18 PROCEDURE — 80069 RENAL FUNCTION PANEL: CPT | Mod: HCNC

## 2020-09-18 PROCEDURE — 85025 COMPLETE CBC W/AUTO DIFF WBC: CPT | Mod: HCNC

## 2020-09-18 PROCEDURE — 80076 HEPATIC FUNCTION PANEL: CPT | Mod: HCNC,59

## 2020-09-18 PROCEDURE — 80061 LIPID PANEL: CPT | Mod: HCNC

## 2020-09-18 PROCEDURE — 83036 HEMOGLOBIN GLYCOSYLATED A1C: CPT | Mod: HCNC

## 2020-09-19 LAB
CHOLEST SERPL-MCNC: 247 MG/DL (ref 120–199)
CHOLEST/HDLC SERPL: 4.1 {RATIO} (ref 2–5)
HDLC SERPL-MCNC: 60 MG/DL (ref 40–75)
HDLC SERPL: 24.3 % (ref 20–50)
LDLC SERPL CALC-MCNC: 163.8 MG/DL (ref 63–159)
NONHDLC SERPL-MCNC: 187 MG/DL
TRIGL SERPL-MCNC: 116 MG/DL (ref 30–150)

## 2020-09-22 ENCOUNTER — OFFICE VISIT (OUTPATIENT)
Dept: INTERNAL MEDICINE | Facility: CLINIC | Age: 85
End: 2020-09-22
Payer: MEDICARE

## 2020-09-22 VITALS
SYSTOLIC BLOOD PRESSURE: 136 MMHG | HEIGHT: 64 IN | HEART RATE: 65 BPM | DIASTOLIC BLOOD PRESSURE: 70 MMHG | BODY MASS INDEX: 29.77 KG/M2 | TEMPERATURE: 98 F | OXYGEN SATURATION: 95 % | WEIGHT: 174.38 LBS

## 2020-09-22 DIAGNOSIS — Z78.0 ASYMPTOMATIC MENOPAUSAL STATE: ICD-10-CM

## 2020-09-22 DIAGNOSIS — I12.9 CKD STAGE 4 SECONDARY TO HYPERTENSION: ICD-10-CM

## 2020-09-22 DIAGNOSIS — N18.4 CKD STAGE 4 SECONDARY TO HYPERTENSION: ICD-10-CM

## 2020-09-22 DIAGNOSIS — R73.03 PRE-DIABETES: ICD-10-CM

## 2020-09-22 DIAGNOSIS — E78.2 MIXED HYPERLIPIDEMIA: ICD-10-CM

## 2020-09-22 DIAGNOSIS — M85.859 OSTEOPENIA OF HIP, UNSPECIFIED LATERALITY: ICD-10-CM

## 2020-09-22 DIAGNOSIS — I10 ESSENTIAL HYPERTENSION: Primary | ICD-10-CM

## 2020-09-22 PROCEDURE — 1101F PR PT FALLS ASSESS DOC 0-1 FALLS W/OUT INJ PAST YR: ICD-10-PCS | Mod: HCNC,CPTII,S$GLB, | Performed by: FAMILY MEDICINE

## 2020-09-22 PROCEDURE — 1159F PR MEDICATION LIST DOCUMENTED IN MEDICAL RECORD: ICD-10-PCS | Mod: HCNC,S$GLB,, | Performed by: FAMILY MEDICINE

## 2020-09-22 PROCEDURE — 99499 UNLISTED E&M SERVICE: CPT | Mod: HCNC,S$GLB,, | Performed by: FAMILY MEDICINE

## 2020-09-22 PROCEDURE — 99999 PR PBB SHADOW E&M-EST. PATIENT-LVL V: ICD-10-PCS | Mod: PBBFAC,HCNC,, | Performed by: FAMILY MEDICINE

## 2020-09-22 PROCEDURE — 1159F MED LIST DOCD IN RCRD: CPT | Mod: HCNC,S$GLB,, | Performed by: FAMILY MEDICINE

## 2020-09-22 PROCEDURE — 1126F PR PAIN SEVERITY QUANTIFIED, NO PAIN PRESENT: ICD-10-PCS | Mod: HCNC,S$GLB,, | Performed by: FAMILY MEDICINE

## 2020-09-22 PROCEDURE — 99213 OFFICE O/P EST LOW 20 MIN: CPT | Mod: HCNC,S$GLB,, | Performed by: FAMILY MEDICINE

## 2020-09-22 PROCEDURE — 99213 PR OFFICE/OUTPT VISIT, EST, LEVL III, 20-29 MIN: ICD-10-PCS | Mod: HCNC,S$GLB,, | Performed by: FAMILY MEDICINE

## 2020-09-22 PROCEDURE — 1101F PT FALLS ASSESS-DOCD LE1/YR: CPT | Mod: HCNC,CPTII,S$GLB, | Performed by: FAMILY MEDICINE

## 2020-09-22 PROCEDURE — 1126F AMNT PAIN NOTED NONE PRSNT: CPT | Mod: HCNC,S$GLB,, | Performed by: FAMILY MEDICINE

## 2020-09-22 PROCEDURE — 99499 RISK ADDL DX/OHS AUDIT: ICD-10-PCS | Mod: HCNC,S$GLB,, | Performed by: FAMILY MEDICINE

## 2020-09-22 PROCEDURE — 99999 PR PBB SHADOW E&M-EST. PATIENT-LVL V: CPT | Mod: PBBFAC,HCNC,, | Performed by: FAMILY MEDICINE

## 2020-09-22 RX ORDER — HYDROCHLOROTHIAZIDE 12.5 MG/1
12.5 TABLET ORAL DAILY
COMMUNITY
Start: 2020-09-02 | End: 2021-11-30

## 2020-09-29 ENCOUNTER — PATIENT MESSAGE (OUTPATIENT)
Dept: OTHER | Facility: OTHER | Age: 85
End: 2020-09-29

## 2020-10-14 ENCOUNTER — OFFICE VISIT (OUTPATIENT)
Dept: NEPHROLOGY | Facility: CLINIC | Age: 85
End: 2020-10-14
Payer: MEDICARE

## 2020-10-14 VITALS
SYSTOLIC BLOOD PRESSURE: 140 MMHG | WEIGHT: 177.69 LBS | HEART RATE: 58 BPM | HEIGHT: 64 IN | RESPIRATION RATE: 20 BRPM | DIASTOLIC BLOOD PRESSURE: 70 MMHG | BODY MASS INDEX: 30.34 KG/M2

## 2020-10-14 DIAGNOSIS — N18.30 STAGE 3 CHRONIC KIDNEY DISEASE, UNSPECIFIED WHETHER STAGE 3A OR 3B CKD: Primary | ICD-10-CM

## 2020-10-14 PROCEDURE — 99214 PR OFFICE/OUTPT VISIT, EST, LEVL IV, 30-39 MIN: ICD-10-PCS | Mod: HCNC,S$GLB,, | Performed by: INTERNAL MEDICINE

## 2020-10-14 PROCEDURE — 1159F MED LIST DOCD IN RCRD: CPT | Mod: HCNC,S$GLB,, | Performed by: INTERNAL MEDICINE

## 2020-10-14 PROCEDURE — 1126F AMNT PAIN NOTED NONE PRSNT: CPT | Mod: HCNC,S$GLB,, | Performed by: INTERNAL MEDICINE

## 2020-10-14 PROCEDURE — 99214 OFFICE O/P EST MOD 30 MIN: CPT | Mod: HCNC,S$GLB,, | Performed by: INTERNAL MEDICINE

## 2020-10-14 PROCEDURE — 1101F PR PT FALLS ASSESS DOC 0-1 FALLS W/OUT INJ PAST YR: ICD-10-PCS | Mod: HCNC,CPTII,S$GLB, | Performed by: INTERNAL MEDICINE

## 2020-10-14 PROCEDURE — 99999 PR PBB SHADOW E&M-EST. PATIENT-LVL IV: ICD-10-PCS | Mod: PBBFAC,HCNC,, | Performed by: INTERNAL MEDICINE

## 2020-10-14 PROCEDURE — 99999 PR PBB SHADOW E&M-EST. PATIENT-LVL IV: CPT | Mod: PBBFAC,HCNC,, | Performed by: INTERNAL MEDICINE

## 2020-10-14 PROCEDURE — 1126F PR PAIN SEVERITY QUANTIFIED, NO PAIN PRESENT: ICD-10-PCS | Mod: HCNC,S$GLB,, | Performed by: INTERNAL MEDICINE

## 2020-10-14 PROCEDURE — 1101F PT FALLS ASSESS-DOCD LE1/YR: CPT | Mod: HCNC,CPTII,S$GLB, | Performed by: INTERNAL MEDICINE

## 2020-10-14 PROCEDURE — 1159F PR MEDICATION LIST DOCUMENTED IN MEDICAL RECORD: ICD-10-PCS | Mod: HCNC,S$GLB,, | Performed by: INTERNAL MEDICINE

## 2020-10-14 NOTE — PROGRESS NOTES
Subjective:       Patient ID: Shirley Metz is a 87 y.o. White female who presents for follow up  evaluation of No chief complaint on file.    Hypertension  This is a chronic problem. The current episode started more than 1 year ago. The problem has been waxing and waning since onset. The problem is resistant. Associated symptoms include anxiety and peripheral edema. Pertinent negatives include no chest pain, headaches, neck pain, palpitations or shortness of breath. There are no associated agents (passive smoking years ago ) to hypertension. Risk factors for coronary artery disease include dyslipidemia, obesity, post-menopausal state, sedentary lifestyle and smoking/tobacco exposure. Past treatments include angiotensin blockers, beta blockers, calcium channel blockers and diuretics. The current treatment provides moderate improvement. There are no compliance problems.  Hypertensive end-organ damage includes left ventricular hypertrophy. Identifiable causes of hypertension include sleep apnea.   Hematuria  Irritative symptoms do not include frequency or urgency. Pertinent negatives include no abdominal pain, chills, dysuria, fever, flank pain, nausea or vomiting.   Edema  Pertinent negatives include no abdominal pain, arthralgias, chest pain, chills, congestion, coughing, diaphoresis, fatigue, fever, headaches, joint swelling, nausea, neck pain, numbness, rash, vomiting or weakness.     Patient is an 85-year-old white female with long-standing history of hypertension which was adequately controlled in 2014 .  She has had issues of edema of the legs and also intolerance to different medications.  She has had severe weakness of her arms and legs for which she is now walking with a walker.  Apparently she has had severe side effects from cholesterol medications.  He has some symptoms of sleep apnea.        In 9/2014 she comes in for consultation for fluctuating blood pressure status post 3 visits to the  emergency room.  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 Cardiology consulted. Patient treated with HCTZ, Losartan, Metoprolol, and PRN antihypertensives. EKG unremarkable. 2D ECHO with EF 60-65% with DD and pulmonary HTN. Troponin trended up. NM stress test negative on 05/15/17. BP improved. .        January 2018 Today seen for follow up after a gap of over 2 years.  Creatinine has been fluctuating between 1.2 and 1.3.  No recent UTI.  Records reviewed 2015.  Normal vitamin D level.  Recent emergency room visit was for shortness of breath and hypertension uncontrolled.  Patient has been followed in multiple specialties including primary care physician Dr. Terrell, due to multiple ALLERGIES/reactions from medications patient has been managed with multiple blood pressure medications without clear evidence of ALLERGY.  Patient has had history of carvedilol causing ALLERGIC reaction but when she was seen in the emergency room no evidence was documented ALLERGY or lip swelling.  Patient has been very difficult to control with blood pressure.  Patient has been followed and managed in cardiology division as well as rheumatology division.  Patient does have diastolic dysfunction and  "pulmonary hypertension with evidence of hypertension uncontrolled. Corneal transplant both sides. Off HCTZ due to polyuria       September 23, 2019:  Patient was last seen by Dr. Beach on 1/31/18. She presents to renal clinic for follow-up today. Renal function has remained stable with creatinine at 1.3.   Patient states that she is currently taking Clonidine and Losartan for BP control only. She stopped taking HCTZ because of frequent urination. No major complaints at present.       February 7, 2020:  Patient reports new onset atrial fibrillation since last visit. She also developed intracardiac clot which has now resolved. Patient is s/p cardioversion and now in sinus rhythm. She is still on Eliquis. Patient also reports problems with fluctuating BP. This is in part related to frequent BP medication changes since patient has developed allergies to multiple commonly used BP meds. She is currently on Losartan, metoprolol, isosorbide and aldactone for BP control. She presents home SBP with average of 150s. Patient has no complaints at present.     October 14, 2020:  Patient without complaints. Creatinine at 1.7 today.     Review of Systems   Constitutional: Negative for chills, diaphoresis, fatigue and fever.   HENT: Negative for congestion.    Respiratory: Negative for cough and shortness of breath.    Cardiovascular: Negative for chest pain and palpitations.   Gastrointestinal: Negative for abdominal pain, nausea and vomiting.   Genitourinary: Negative for dysuria, flank pain, frequency, hematuria and urgency.   Musculoskeletal: Negative for arthralgias, joint swelling and neck pain.   Skin: Negative for rash.   Neurological: Negative for weakness, numbness and headaches.         Objective:       Vitals:    10/14/20 1002   BP: (!) 140/70   Pulse: (!) 58   Resp: 20   Weight: 80.6 kg (177 lb 11.1 oz)   Height: 5' 4" (1.626 m)       Physical Exam   Constitutional: She is oriented to person, place, and time. She appears " well-developed and well-nourished.   HENT:   Head: Normocephalic and atraumatic.   Eyes: Pupils are equal, round, and reactive to light. Conjunctivae and EOM are normal.   Neck: Normal range of motion and full passive range of motion without pain. Neck supple. Carotid bruit is not present. No edema present. No thyroid mass and no thyromegaly present.   Cardiovascular: Normal rate, regular rhythm, S1 normal, S2 normal, normal heart sounds, intact distal pulses and normal pulses.  No extrasystoles are present. PMI is not displaced. Exam reveals no friction rub.   No murmur heard.  Pulmonary/Chest: Effort normal and breath sounds normal. No accessory muscle usage. No respiratory distress. She has no wheezes. She has no rales. She exhibits no tenderness.   Abdominal: Soft. Bowel sounds are normal. She exhibits no distension and no mass. There is no hepatosplenomegaly. There is no abdominal tenderness. There is no rebound and no CVA tenderness. No hernia.   Musculoskeletal: Normal range of motion.         General: No tenderness or edema.   Neurological: She is alert and oriented to person, place, and time. She has normal reflexes. No cranial nerve deficit or sensory deficit. She exhibits normal muscle tone. Coordination normal.   Skin: Skin is warm and dry. No bruising, no ecchymosis and no rash noted. No cyanosis or erythema. No pallor. Nails show no clubbing.   Psychiatric: She has a normal mood and affect. Her speech is normal and behavior is normal. Judgment and thought content normal.         Lab Results   Component Value Date    WBC 6.43 09/18/2020    HGB 12.0 09/18/2020    HCT 36.7 (L) 09/18/2020     (H) 09/18/2020     09/18/2020     Lab Results   Component Value Date    CREATININE 1.7 (H) 09/18/2020    BUN 24 (H) 09/18/2020     09/18/2020    K 4.0 09/18/2020     09/18/2020    CO2 29 09/18/2020     Lab Results   Component Value Date    PTH 76.8 09/18/2020    CALCIUM 9.4 09/18/2020    PHOS  3.9 09/18/2020     Lab Results   Component Value Date    ALBUMIN 3.6 09/18/2020    ALBUMIN 3.6 09/18/2020     Urinalysis: no protein, no blood (1/28/20)    Assessment:       No diagnosis found.    Plan:           1.  Hypertension: She reports home SBP with average of 130s. Continue current regimen.    2.  Chronic kidney disease stage III: Stable with creatinine at 1.7. No proteinuria/hematuria. She will return to clinic in 5 months.     3. Electrolytes: at goal.    4. Acid-base: no issues.    5. Atrial fibrillation: s/p cardioversion, now in NSR, still on Eliquis, followed by Cardiology.    6. Medications. Reviewed.         Sven Cortez MD

## 2020-10-19 ENCOUNTER — IMMUNIZATION (OUTPATIENT)
Dept: INTERNAL MEDICINE | Facility: CLINIC | Age: 85
End: 2020-10-19
Payer: MEDICARE

## 2020-10-19 ENCOUNTER — APPOINTMENT (OUTPATIENT)
Dept: RADIOLOGY | Facility: HOSPITAL | Age: 85
End: 2020-10-19
Attending: FAMILY MEDICINE
Payer: MEDICARE

## 2020-10-19 DIAGNOSIS — Z78.0 ASYMPTOMATIC MENOPAUSAL STATE: ICD-10-CM

## 2020-10-19 PROCEDURE — G0008 ADMIN INFLUENZA VIRUS VAC: HCPCS | Mod: HCNC,S$GLB,, | Performed by: FAMILY MEDICINE

## 2020-10-19 PROCEDURE — 77080 DXA BONE DENSITY AXIAL: CPT | Mod: 26,HCNC,, | Performed by: RADIOLOGY

## 2020-10-19 PROCEDURE — 90694 FLU VACCINE - QUADRIVALENT - ADJUVANTED: ICD-10-PCS | Mod: HCNC,S$GLB,, | Performed by: FAMILY MEDICINE

## 2020-10-19 PROCEDURE — 77080 DEXA BONE DENSITY SPINE HIP: ICD-10-PCS | Mod: 26,HCNC,, | Performed by: RADIOLOGY

## 2020-10-19 PROCEDURE — G0008 FLU VACCINE - QUADRIVALENT - ADJUVANTED: ICD-10-PCS | Mod: HCNC,S$GLB,, | Performed by: FAMILY MEDICINE

## 2020-10-19 PROCEDURE — 90694 VACC AIIV4 NO PRSRV 0.5ML IM: CPT | Mod: HCNC,S$GLB,, | Performed by: FAMILY MEDICINE

## 2020-10-19 PROCEDURE — 77080 DXA BONE DENSITY AXIAL: CPT | Mod: TC,HCNC

## 2020-10-28 ENCOUNTER — OFFICE VISIT (OUTPATIENT)
Dept: HOME HEALTH SERVICES | Facility: CLINIC | Age: 85
End: 2020-10-28
Payer: MEDICARE

## 2020-10-28 VITALS
BODY MASS INDEX: 30.22 KG/M2 | OXYGEN SATURATION: 97 % | DIASTOLIC BLOOD PRESSURE: 67 MMHG | HEIGHT: 64 IN | WEIGHT: 177 LBS | HEART RATE: 58 BPM | TEMPERATURE: 97 F | SYSTOLIC BLOOD PRESSURE: 155 MMHG

## 2020-10-28 DIAGNOSIS — M19.012 BILATERAL SHOULDER REGION ARTHRITIS: ICD-10-CM

## 2020-10-28 DIAGNOSIS — Z94.7 HISTORY OF CORNEA TRANSPLANT: ICD-10-CM

## 2020-10-28 DIAGNOSIS — H40.1131 CHRONIC OPEN ANGLE GLAUCOMA OF BOTH EYES, MILD STAGE: ICD-10-CM

## 2020-10-28 DIAGNOSIS — F41.9 ANXIETY: ICD-10-CM

## 2020-10-28 DIAGNOSIS — M85.852 OSTEOPENIA OF LEFT HIP: ICD-10-CM

## 2020-10-28 DIAGNOSIS — R73.03 PRE-DIABETES: ICD-10-CM

## 2020-10-28 DIAGNOSIS — D64.9 ANEMIA, UNSPECIFIED TYPE: ICD-10-CM

## 2020-10-28 DIAGNOSIS — I70.0 CALCIFICATION OF AORTA: Chronic | ICD-10-CM

## 2020-10-28 DIAGNOSIS — N18.30 STAGE 3 CHRONIC KIDNEY DISEASE, UNSPECIFIED WHETHER STAGE 3A OR 3B CKD: ICD-10-CM

## 2020-10-28 DIAGNOSIS — Z74.09 OTHER REDUCED MOBILITY: ICD-10-CM

## 2020-10-28 DIAGNOSIS — Z79.01 CHRONIC ANTICOAGULATION: ICD-10-CM

## 2020-10-28 DIAGNOSIS — M19.011 BILATERAL SHOULDER REGION ARTHRITIS: ICD-10-CM

## 2020-10-28 DIAGNOSIS — G47.00 INSOMNIA, UNSPECIFIED TYPE: ICD-10-CM

## 2020-10-28 DIAGNOSIS — E66.9 OBESITY (BMI 30.0-34.9): ICD-10-CM

## 2020-10-28 DIAGNOSIS — H18.519 FUCHS' CORNEAL DYSTROPHY: Chronic | ICD-10-CM

## 2020-10-28 DIAGNOSIS — E78.2 MIXED HYPERLIPIDEMIA: ICD-10-CM

## 2020-10-28 DIAGNOSIS — Z00.00 ENCOUNTER FOR PREVENTIVE HEALTH EXAMINATION: Primary | ICD-10-CM

## 2020-10-28 DIAGNOSIS — I48.0 PAROXYSMAL ATRIAL FIBRILLATION: ICD-10-CM

## 2020-10-28 DIAGNOSIS — I50.30 HEART FAILURE WITH PRESERVED LEFT VENTRICULAR FUNCTION (HFPEF): ICD-10-CM

## 2020-10-28 DIAGNOSIS — I10 ESSENTIAL HYPERTENSION: Chronic | ICD-10-CM

## 2020-10-28 DIAGNOSIS — H35.30 MACULAR DEGENERATION, UNSPECIFIED LATERALITY, UNSPECIFIED TYPE: Chronic | ICD-10-CM

## 2020-10-28 PROBLEM — I16.0 HYPERTENSIVE URGENCY: Status: RESOLVED | Noted: 2017-05-13 | Resolved: 2020-10-28

## 2020-10-28 PROBLEM — I50.32 CHRONIC DIASTOLIC CONGESTIVE HEART FAILURE: Status: RESOLVED | Noted: 2019-10-15 | Resolved: 2020-10-28

## 2020-10-28 PROBLEM — I48.91 NEW ONSET A-FIB: Status: RESOLVED | Noted: 2019-10-09 | Resolved: 2020-10-28

## 2020-10-28 PROCEDURE — G0439 PR MEDICARE ANNUAL WELLNESS SUBSEQUENT VISIT: ICD-10-PCS | Mod: S$GLB,,, | Performed by: NURSE PRACTITIONER

## 2020-10-28 PROCEDURE — G0439 PPPS, SUBSEQ VISIT: HCPCS | Mod: S$GLB,,, | Performed by: NURSE PRACTITIONER

## 2020-10-28 NOTE — Clinical Note
Medicare awv complete. Health maintenance: Patient declined shingles and pneumonia vaccine. Annual physical exam due.     2. Essential hypertension  This problem is currently not controlled. Continue amiodarone, hctz, and cozaar, and lopressor.  follow up with your PCP as planned to discuss adjustments to your treatment plan.

## 2020-10-28 NOTE — PROGRESS NOTES
"Shirley Metz presented for Medicare AWV today. The following components were reviewed and updated:    · Medical history  · Family History  · Social history  · Allergies and Current Medications  · Health Risk Assessment  · Health Maintenance  · Care Team    **See Completed Assessments for Annual Wellness visit with in the encounter summary    The following assessments were completed:  · Depression Screening  · Cognitive function Screening      · Timed Get Up Test  · Whisper Test    Vitals:    10/28/20 0903   BP: (!) 155/67   Pulse: (!) 58   Temp: 97.2 °F (36.2 °C)   TempSrc: Temporal   SpO2: 97%   Weight: 80.3 kg (177 lb)   Height: 5' 4" (1.626 m)     Body mass index is 30.38 kg/m².   ]    Physical Exam  Vitals signs reviewed.   Constitutional:       Appearance: Normal appearance. She is obese.   HENT:      Head: Normocephalic and atraumatic.   Neck:      Musculoskeletal: Normal range of motion and neck supple.   Cardiovascular:      Rate and Rhythm: Normal rate and regular rhythm.      Pulses: Normal pulses.      Heart sounds: Normal heart sounds.   Pulmonary:      Effort: Pulmonary effort is normal.      Breath sounds: Normal breath sounds.   Musculoskeletal: Normal range of motion.   Skin:     General: Skin is warm and dry.      Comments: Superficial Varicose veins to distal bilateral lower extremities.    Neurological:      Mental Status: She is alert and oriented to person, place, and time.   Psychiatric:         Mood and Affect: Mood normal.         Behavior: Behavior normal.          Outpatient Medications Marked as Taking for the 10/28/20 encounter (Office Visit) with FABIO Aguilar   Medication Sig Dispense Refill    acetaminophen (TYLENOL) 500 MG tablet Take 500 mg by mouth daily as needed. Taking 1 to 3      ALPRAZolam (XANAX) 0.5 MG tablet TAKE 1/2 - 1 TABLET BY MOUTH NIGHTLY FOR SLEEP OR ANXIETY 30 tablet 1    amiodarone (PACERONE) 200 MG Tab Take 0.5 tablets (100 mg total) by mouth " once daily. 45 tablet 11    apixaban (ELIQUIS) 2.5 mg Tab Take 1 tablet (2.5 mg total) by mouth 2 (two) times daily. 180 tablet 3    b complex vitamins capsule Take 1 capsule by mouth once daily.      cholecalciferol, vitamin D3, (VITAMIN D3) 2,000 unit Cap Take 1 capsule by mouth once daily.      cloNIDine (CATAPRES) 0.1 MG tablet Take 0.1 mg by mouth 2 (two) times daily as needed.      coenzyme Q10 200 mg capsule Take 400 mg by mouth once daily.       fish oil-omega-3 fatty acids 300-1,000 mg capsule Take 2 g by mouth 2 (two) times daily.       hydroCHLOROthiazide (HYDRODIURIL) 12.5 MG Tab Take 12.5 mg by mouth once daily.       isosorbide mononitrate (IMDUR) 60 MG 24 hr tablet Take 1 tablet (60 mg total) by mouth once daily. 90 tablet 3    losartan (COZAAR) 25 MG tablet Take 1 tablet (25 mg total) by mouth 2 (two) times daily. 180 tablet 3    loteprednol (LOTEMAX) 0.5 % ophthalmic suspension Place 1 drop into both eyes once daily. 10 mL 3    metoprolol tartrate (LOPRESSOR) 25 MG tablet Take 0.5 tablets (12.5 mg total) by mouth 2 (two) times daily. 30 tablet 11    POLYETHYLENE GLYCOL 3350 (MIRALAX ORAL) Take by mouth once daily. Taking BID      RHOPRESSA 0.02 % Drop PLACE 1 DROP INTO BOTH EYES EVERY EVENING. (Patient taking differently: Place 1 drop into the left eye every evening. ) 2.5 mL 11    TRAVATAN Z 0.004 % ophthalmic solution PLACE 1 DROP INTO THE LEFT EYE EVERY EVENING. 2.5 mL 12        Diagnoses and health risks identified today and associated recommendations/orders:  1. Encounter for preventive health examination  Medicare aw complete. Health maintenance: Patient declined shingles and pneumonia vaccine. Annual physical exam due.     2. Essential hypertension  This problem is currently not controlled. Continue amiodarone, hctz, and cozaar, and lopressor.  follow up with your PCP as planned to discuss adjustments to your treatment plan.     3. Heart failure with preserved left ventricular  function (HFpEF)  Chronic and stable. No acute issues. Continue current management. See med list above. Follow up with cardiology.     4. Calcification of aorta  Chronic and stable. No acute issues. Continue current management. See med list above. Follow up with PCP.     5. Paroxysmal atrial fibrillation  Chronic and stable. No acute issues. Continue current management Eliquis, amiodarone, lopressor. See med list above. Follow up with cardiology.     6. Chronic anticoagulation  See #5.    7. Mixed hyperlipidemia  Lab Results   Component Value Date    CHOL 247 (H) 09/18/2020    CHOL 233 (H) 08/01/2019    CHOL 180 03/16/2018     Lab Results   Component Value Date    HDL 60 09/18/2020    HDL 49 08/01/2019    HDL 43 03/16/2018     Lab Results   Component Value Date    LDLCALC 163.8 (H) 09/18/2020    LDLCALC 155.0 08/01/2019    LDLCALC 117.6 03/16/2018     Lab Results   Component Value Date    TRIG 116 09/18/2020    TRIG 145 08/01/2019    TRIG 97 03/16/2018     Lab Results   Component Value Date    CHOLHDL 24.3 09/18/2020    CHOLHDL 21.0 08/01/2019    CHOLHDL 23.9 03/16/2018    Chronic and stable. No acute issues. Continue current management. See med list above. Follow up with PCP.     8. Anemia, unspecified type   Results for NAYANA UZÑIGA ACOSTA VALDOVINOS (MRN [id number]) as of 10/28/2020 14:39   Ref. Range 9/18/2020 10:10   Hemoglobin Latest Ref Range: 12.0 - 16.0 g/dL 12.0   Hematocrit Latest Ref Range: 37.0 - 48.5 % 36.7 (L)   Stable. F/u with PCP.     9. Obesity (BMI 30.0-34.9)  This problem is currently not controlled.  follow up with your PCP as planned to discuss adjustments to your treatment plan.     10. Pre-diabetes  Lab Results   Component Value Date    HGBA1C 5.9 (H) 09/18/2020    Chronic and stable. No acute issues. Continue current management. See med list above. Follow up with PCP.     11. Bilateral shoulder region arthritis  Chronic and stable. No acute issues. Continue current management. See med list above.  Follow up with PCP.     12. Osteopenia of left hip  Chronic and stable. No acute issues. Continue current management. Recommend vitamin d supplement, calcium in the diet, and weight bearing exercise. Follow up with PCP.     13. Chronic open angle glaucoma of both eyes, mild stage  Chronic and stable. No acute issues. Continue current management. See med list above. Follow up with ophthalmology.     14. Macular degeneration, unspecified laterality, unspecified type  Chronic and stable. No acute issues. Continue current management. See med list above. Follow up with ophthalmology.     15. Fuch's endothelial dystrophy - Both Eyes  Chronic and stable. No acute issues. Continue current management. Follow up with ophthalmology.     16. History of cornea transplant  Chronic and stable. No acute issues. Continue current management. Follow up with ophthalmology.     17. Anxiety  Chronic and stable. No acute issues. Continue current management on xanax. See med list above. Follow up with PCP.     18. Insomnia, unspecified type  Chronic and stable. No acute issues. Continue current management on xanax. See med list above. Follow up with PCP.     19. Other reduced mobility  Chronic and stable. No acute issues. Continue current management. Fall precautions recommended. Follow up with PCP.      20. Stage 3 chronic kidney disease, unspecified whether stage 3a or 3b CKD  Results for ACOSTA SLAUGHTER (MRN [id number]) as of 10/28/2020 14:39   Ref. Range 2/19/2020 08:55 6/26/2020 09:26 8/31/2020 21:01 9/18/2020 10:10   eGFR if non African American Latest Ref Range: >60 mL/min/1.73 m^2 29 (A) 29 (A) 29 (A) 27 (A)   Chronic and stable. No acute issues. Continue current management.  Follow up with nephrology.         Provided Acosta with a 5-10 year written screening schedule and personal prevention plan. Recommendations were developed using the USPSTF age appropriate recommendations. Education, counseling, and referrals were  provided as needed.  After Visit Summary printed and given to patient which includes a list of additional screenings\tests needed.    Follow up in about 1 year (around 10/28/2021) for annual wellness visit.      FABIO Aguilar    Patient will bring medical living will and power of  to [facility] to be uploaded to code section of her chart.

## 2020-10-28 NOTE — PATIENT INSTRUCTIONS
Counseling and Referral of Other Preventative  (Italic type indicates deductible and co-insurance are waived)    Patient Name: Shirley Metz  Today's Date: 10/28/2020    Health Maintenance       Date Due Completion Date    Pneumococcal Vaccine (65+ Low/Medium Risk) (1 of 2 - PCV13) 10/28/2021 (Originally 12/3/1997) ---    Shingles Vaccine (1 of 2) 10/28/2021 (Originally 12/3/1982) ---    Lipid Panel 09/18/2021 9/18/2020    TETANUS VACCINE 11/09/2021 11/9/2011    DEXA SCAN 10/19/2023 10/19/2020    Override on 11/17/2008: Done    Override on 11/21/2006: Done    Override on 12/8/2003: Done        No orders of the defined types were placed in this encounter.    The following information is provided to all patients.  This information is to help you find resources for any of the problems found today that may be affecting your health:                Living healthy guide: www.[facility].[place].[facility]      Understanding Diabetes: www.diabetes.org      Eating healthy: www.cdc.gov/healthyweight      CDC home safety checklist: www.cdc.gov/steadi/patient.html      Agency on Aging: www.goea.[place].gov      Alcoholics anonymous (AA): www.aa.org      Physical Activity: www.andrew.nih.gov/xo1ueqt      Tobacco use: www.quitwithusla.org

## 2020-11-06 ENCOUNTER — OFFICE VISIT (OUTPATIENT)
Dept: OPHTHALMOLOGY | Facility: CLINIC | Age: 85
End: 2020-11-06
Payer: MEDICARE

## 2020-11-06 DIAGNOSIS — M35.01 KERATITIS SICCA, BILATERAL: ICD-10-CM

## 2020-11-06 DIAGNOSIS — Z96.1 PSEUDOPHAKIA: ICD-10-CM

## 2020-11-06 DIAGNOSIS — Z94.7 HISTORY OF CORNEA TRANSPLANT: ICD-10-CM

## 2020-11-06 DIAGNOSIS — H40.1131 PRIMARY OPEN ANGLE GLAUCOMA (POAG) OF BOTH EYES, MILD STAGE: Primary | ICD-10-CM

## 2020-11-06 DIAGNOSIS — H35.3131 EARLY DRY STAGE NONEXUDATIVE AGE-RELATED MACULAR DEGENERATION OF BOTH EYES: ICD-10-CM

## 2020-11-06 PROCEDURE — 92014 PR EYE EXAM, EST PATIENT,COMPREHESV: ICD-10-PCS | Mod: HCNC,S$GLB,, | Performed by: OPHTHALMOLOGY

## 2020-11-06 PROCEDURE — 92014 COMPRE OPH EXAM EST PT 1/>: CPT | Mod: HCNC,S$GLB,, | Performed by: OPHTHALMOLOGY

## 2020-11-06 PROCEDURE — 99999 PR PBB SHADOW E&M-EST. PATIENT-LVL II: CPT | Mod: PBBFAC,HCNC,, | Performed by: OPHTHALMOLOGY

## 2020-11-06 PROCEDURE — 99999 PR PBB SHADOW E&M-EST. PATIENT-LVL II: ICD-10-PCS | Mod: PBBFAC,HCNC,, | Performed by: OPHTHALMOLOGY

## 2020-11-06 NOTE — PROGRESS NOTES
SUBJECTIVE  AdrianaIfrah Metz is 87 y.o. female  Corrected distance visual acuity was 20/30 +1 in the right eye and 20/30 -2 in the left eye.   Chief Complaint   Patient presents with    Glaucoma     3 month FD, OS: Travatan QHS, Rhopressa QHS, OU: Lotemax MWF and sunday once a day          HPI     Glaucoma      Additional comments: 3 month FD, OS: Travatan QHS, Rhopressa QHS, OU:   Lotemax MWF and sunday once a day              Comments     Pt states she's been having a few floaters since her last visit but not   any flashes or any big changes. Using her drops as directed. No pain in   the eyes.     1. Mild COAG Goal < 19  SLT OD 3/04 (20 to 15) + 3/11 (19 to 15)  SLT OS 5/05 (20 to 14) +5/11 (18-19 to 15) + 3/12 (min resp.) + 3/11/15   (20.5-17.5) + 3/7/18 (24- 21)  (Cosopt, Xalatan, and Timolol cause Myalgias)  (Alphagan causes redness) (zioptan too expensive)  Possible steroid responder  2. PCIOL OU  3. Dry AMD  4. Fuch's Dystrophy   DSAEK OD 4/16 Dr. Quintanilla (followed by [facility] every summer)  DSAEK OS 8/15 Dr. Quintanilla  Rx Inveltys (1% lotemax) (IOP 15/23) 5/29/20  5. Dry Eye -intolerance to Restasis   6. Recent A-Fib. (from Labetolol ?)      Lotemax OU: Mon, Wed,Fri and Sun once daily  Systane Ultra 4-5 tiimes daily  Travatan Z qhs os  Rhopressa qhs OS          Last edited by Wenceslao Sher on 11/6/2020  9:48 AM. (History)         Assessment /Plan :  1. Primary open angle glaucoma (POAG) of both eyes, mild stage Doing well, IOP within acceptable range relative to target IOP and no evidence of progression. Continue current treatment. Reviewed importance of continued compliance with treatment and follow up.     2. Pseudophakia  -- Condition stable, no therapeutic change required. Monitoring routinely.     3. Keratitis sicca, bilateral  -- Condition stable, no therapeutic change required. Monitoring routinely.     4. History of cornea transplant  -- Condition stable, no therapeutic change required.  Monitoring routinely. followed by Dr. Quintanilla     5. Early dry stage nonexudative age-related macular degeneration of both eyes  -- Condition stable, no therapeutic change required. Monitoring routinely.         Return to clinic in 4 months  or as needed.  With IOP Check and GOCT

## 2020-11-20 ENCOUNTER — PATIENT MESSAGE (OUTPATIENT)
Dept: INTERNAL MEDICINE | Facility: CLINIC | Age: 85
End: 2020-11-20

## 2020-11-23 DIAGNOSIS — M85.859 OSTEOPENIA OF HIP, UNSPECIFIED LATERALITY: Primary | ICD-10-CM

## 2020-11-23 DIAGNOSIS — M89.9 DISORDER OF BONE, UNSPECIFIED: ICD-10-CM

## 2020-12-04 ENCOUNTER — OFFICE VISIT (OUTPATIENT)
Dept: CARDIOLOGY | Facility: CLINIC | Age: 85
End: 2020-12-04
Payer: MEDICARE

## 2020-12-04 VITALS
WEIGHT: 183.63 LBS | DIASTOLIC BLOOD PRESSURE: 68 MMHG | SYSTOLIC BLOOD PRESSURE: 172 MMHG | HEART RATE: 56 BPM | OXYGEN SATURATION: 96 % | BODY MASS INDEX: 31.52 KG/M2

## 2020-12-04 DIAGNOSIS — D64.9 ANEMIA, UNSPECIFIED TYPE: ICD-10-CM

## 2020-12-04 DIAGNOSIS — I48.0 PAROXYSMAL ATRIAL FIBRILLATION: ICD-10-CM

## 2020-12-04 DIAGNOSIS — I50.30 HEART FAILURE WITH PRESERVED LEFT VENTRICULAR FUNCTION (HFPEF): Primary | ICD-10-CM

## 2020-12-04 DIAGNOSIS — I10 ESSENTIAL HYPERTENSION: Chronic | ICD-10-CM

## 2020-12-04 DIAGNOSIS — E78.2 MIXED HYPERLIPIDEMIA: ICD-10-CM

## 2020-12-04 DIAGNOSIS — N18.32 STAGE 3B CHRONIC KIDNEY DISEASE: ICD-10-CM

## 2020-12-04 PROCEDURE — 1159F MED LIST DOCD IN RCRD: CPT | Mod: HCNC,S$GLB,, | Performed by: INTERNAL MEDICINE

## 2020-12-04 PROCEDURE — 99999 PR PBB SHADOW E&M-EST. PATIENT-LVL IV: CPT | Mod: PBBFAC,HCNC,, | Performed by: INTERNAL MEDICINE

## 2020-12-04 PROCEDURE — 1126F PR PAIN SEVERITY QUANTIFIED, NO PAIN PRESENT: ICD-10-PCS | Mod: HCNC,S$GLB,, | Performed by: INTERNAL MEDICINE

## 2020-12-04 PROCEDURE — 99214 OFFICE O/P EST MOD 30 MIN: CPT | Mod: HCNC,S$GLB,, | Performed by: INTERNAL MEDICINE

## 2020-12-04 PROCEDURE — 1126F AMNT PAIN NOTED NONE PRSNT: CPT | Mod: HCNC,S$GLB,, | Performed by: INTERNAL MEDICINE

## 2020-12-04 PROCEDURE — 99999 PR PBB SHADOW E&M-EST. PATIENT-LVL IV: ICD-10-PCS | Mod: PBBFAC,HCNC,, | Performed by: INTERNAL MEDICINE

## 2020-12-04 PROCEDURE — 1159F PR MEDICATION LIST DOCUMENTED IN MEDICAL RECORD: ICD-10-PCS | Mod: HCNC,S$GLB,, | Performed by: INTERNAL MEDICINE

## 2020-12-04 PROCEDURE — 99499 RISK ADDL DX/OHS AUDIT: ICD-10-PCS | Mod: S$GLB,,, | Performed by: INTERNAL MEDICINE

## 2020-12-04 PROCEDURE — 99499 UNLISTED E&M SERVICE: CPT | Mod: S$GLB,,, | Performed by: INTERNAL MEDICINE

## 2020-12-04 PROCEDURE — 99214 PR OFFICE/OUTPT VISIT, EST, LEVL IV, 30-39 MIN: ICD-10-PCS | Mod: HCNC,S$GLB,, | Performed by: INTERNAL MEDICINE

## 2020-12-04 RX ORDER — FUROSEMIDE 20 MG/1
20 TABLET ORAL
Qty: 12 TABLET | Refills: 11 | Status: SHIPPED | OUTPATIENT
Start: 2020-12-04 | End: 2020-12-07 | Stop reason: SDUPTHER

## 2020-12-04 RX ORDER — METOPROLOL TARTRATE 25 MG/1
12.5 TABLET, FILM COATED ORAL 2 TIMES DAILY
Qty: 90 TABLET | Refills: 2 | Status: SHIPPED | OUTPATIENT
Start: 2020-12-04 | End: 2021-08-23

## 2020-12-04 NOTE — PROGRESS NOTES
Subjective:   Patient ID:  Shirley Metz is a 88 y.o. female who presents for follow up of No chief complaint on file.      89 yo female. Came in for uncontrolled HTN and SOB  PMH pafib, + positive SELVIN, HTN, HLD chfpEF and CKD, s/p corneal Tx   echo showed EF 60%. LVH   Nulcear stress test no ischemia   LUM arterial and carotid US showed mild Dz.   10/07 saw pt in the office for C/o recent weakness,  stomach upset,. Has fast pulse. Some dizziness, and Monet. ekg showed new onset afib with RVR. Increased Mwetoprolol to 100 mg bid and add cardizem and eliquis 2.5 m bid.  10/09, admitted to [facility] for chest pain. Troponin 0.035 chronic elevation. Afib with RVR  . Echo showed normal EF and mild MR> Next day, STACEY showed + SELVIN thrombus and DCCV cancelled. Continued Eliquis  12/07 repeated STACEY no SELVIN thrombus and DCCV x1 converted to sinus   went to ER due to severe left arm pain. Troponin 0.03 and lung perfusion scan negative for PE  Pt has had multiple allergies to HTN medications and multiple ER visits for uncontrolled HTN.   In the past 3 moths, felt better. Walks longer, more energy and less MONET. Sleeps with two pillows. Improved headache and blurred vision. Occasional skin ictching.   SBP high in  to 150 mmHG. Sometime 140 mmHG   Pt Her pulse was at 45 to 55 bpm.   Continue Losartan 100 mg daily  Daily taking clonidine 1 mg daily  Continue Metoprolol 25 mg bid  In the past few weeks the BP controlled and pt feels better    Went to ER on 09/01 for high BP and palpitation. She had two days of anxiety and could not sleep well. She worried she may develop the afib. EKG in ER NSR and Lab normal BNP slight elevation of troponin chronically.   Today reviewed BP log controlled,     Today states that SOB worse for 1 month, with ankle swelling, sleeps with 2 pillows. No PND  No chest pain  SBP up to 170 to 190 mmHG. No headache and vision change. No N/B  Weight gained 16 pounds  compared to       Past Medical History:   Diagnosis Date    Anemia 11/29/2018    Anxiety 11/28/2017    Arthritis     Atrial fibrillation with RVR 10/9/2019    Back pain     Basal cell carcinoma 03/29/2018    left mid back    Chronic diastolic congestive heart failure 10/15/2019    Colon polyps     reported per patient.     Fuchs' corneal dystrophy     General anesthetics causing adverse effect in therapeutic use     woke up during surgery and gagged on ET tube    Glaucoma     Glaucoma     Heart failure with preserved left ventricular function (HFpEF) 7/24/2018    Hyperlipidemia     Hypertension     Macular degeneration dry    Obesity     Squamous cell carcinoma 01/08/2019    left shoulder    Trouble in sleeping     Urinary tract infection        Past Surgical History:   Procedure Laterality Date    ADENOIDECTOMY  1938    BREAST BIOPSY Left     1997. benign    BREAST CYST EXCISION Left 1997 approx    CARPAL TUNNEL RELEASE Right 2012 approx    CATARACT EXTRACTION Bilateral 1994    CHOLECYSTECTOMY  1975    CORNEAL TRANSPLANT Bilateral 08/2015 8/2015 - left; Right 4/2016    EYE SURGERY      Fuch's Corneal dystrophy - had 2nd operation with Dr. Quintanilla    EYE SURGERY      Cataract wIOL    left thumb Left 2011    removed cuboid for arthritis    REVERSE TOTAL SHOULDER ARTHROPLASTY Left 8/21/2018    Procedure: ARTHROPLASTY, SHOULDER, TOTAL, REVERSE;  Surgeon: Solomon Lujan MD;  Location: [facility] OR;  Service: Orthopedics;  Laterality: Left;  WITH BICEP TENODESIS    SKIN CANCER EXCISION Left 2017    BCC removal by Dr. Valadez    SLT- OS (aka TRABECULOPLASTY) Left 05/11/2011    repeat 3/14/12    TENOTOMY Left 8/21/2018    Procedure: TENOTOMY;  Surgeon: Solomon Lujan MD;  Location: [facility] OR;  Service: Orthopedics;  Laterality: Left;    TONSILLECTOMY  1938    TREATMENT OF CARDIAC ARRHYTHMIA N/A 10/10/2019    Procedure: CARDIOVERSION;  Surgeon: Pete Durán MD;  Location: [facility]  CATH LAB;  Service: Cardiology;  Laterality: N/A;    TREATMENT OF CARDIAC ARRHYTHMIA N/A 12/6/2019    Procedure: CARDIOVERSION;  Surgeon: Samy Castillo MD;  Location[facility] CATH LAB;  Service: Cardiology;  Laterality: N/A;       Social History     Tobacco Use    Smoking status: Never Smoker    Smokeless tobacco: Never Used    Tobacco comment: history of passive smoking from her ex-   Substance Use Topics    Alcohol use: Not Currently     Alcohol/week: 2.0 standard drinks     Types: 2 Standard drinks or equivalent per week     Frequency: Monthly or less     Drinks per session: 1 or 2     Binge frequency: Never    Drug use: No       Family History   Problem Relation Age of Onset    Heart disease Mother     Hypertension Mother     Cancer Father 88        sarcoma( ?Kaposi's ) on leg    Deep vein thrombosis Neg Hx     Ovarian cancer Neg Hx     Breast cancer Neg Hx     Kidney disease Neg Hx     Strabismus Neg Hx     Retinal detachment Neg Hx     Macular degeneration Neg Hx     Glaucoma Neg Hx     Blindness Neg Hx     Amblyopia Neg Hx     Eczema Neg Hx     Lupus Neg Hx     Melanoma Neg Hx     Psoriasis Neg Hx     Diabetes Neg Hx     Stroke Neg Hx     Mental retardation Neg Hx     Mental illness Neg Hx     Hyperlipidemia Neg Hx     COPD Neg Hx     Asthma Neg Hx     Depression Neg Hx     Alcohol abuse Neg Hx     Drug abuse Neg Hx          Review of Systems   Constitution: Positive for malaise/fatigue and weight gain. Negative for decreased appetite, diaphoresis, fever and night sweats.   HENT: Negative for nosebleeds.    Eyes: Negative for blurred vision and double vision.   Cardiovascular: Positive for dyspnea on exertion, leg swelling and palpitations. Negative for chest pain, claudication, irregular heartbeat, near-syncope, orthopnea, paroxysmal nocturnal dyspnea and syncope.   Respiratory: Negative for cough, shortness of breath, sleep disturbances due to breathing, snoring, sputum  production and wheezing.    Endocrine: Negative for cold intolerance and polyuria.   Hematologic/Lymphatic: Does not bruise/bleed easily.   Skin: Negative for rash.   Musculoskeletal: Negative for back pain, falls, joint pain, joint swelling and neck pain.   Gastrointestinal: Positive for abdominal pain. Negative for heartburn, nausea and vomiting.   Genitourinary: Negative for dysuria, frequency and hematuria.   Neurological: Positive for dizziness. Negative for difficulty with concentration, focal weakness, headaches, light-headedness, numbness, seizures and weakness.   Psychiatric/Behavioral: Negative for depression, memory loss and substance abuse. The patient does not have insomnia.    Allergic/Immunologic: Negative for HIV exposure and hives.       Objective:   Physical Exam   Constitutional: She is oriented to person, place, and time. She appears well-nourished.   HENT:   Head: Normocephalic.   Eyes: Pupils are equal, round, and reactive to light.   Neck: Normal carotid pulses and no JVD present. Carotid bruit is not present. No thyromegaly present.   Cardiovascular: Normal rate, regular rhythm, normal heart sounds and normal pulses.  No extrasystoles are present. PMI is not displaced. Exam reveals no gallop and no S3.   No murmur heard.  Pulmonary/Chest: Breath sounds normal. No stridor. No respiratory distress.   Abdominal: Soft. Bowel sounds are normal. There is no abdominal tenderness. There is no rebound.   Musculoskeletal: Normal range of motion.   Neurological: She is alert and oriented to person, place, and time.   Skin: Skin is intact. No rash noted.   Psychiatric: Her behavior is normal.       Lab Results   Component Value Date    CHOL 247 (H) 09/18/2020    CHOL 233 (H) 08/01/2019    CHOL 180 03/16/2018     Lab Results   Component Value Date    HDL 60 09/18/2020    HDL 49 08/01/2019    HDL 43 03/16/2018     Lab Results   Component Value Date    LDLCALC 163.8 (H) 09/18/2020    LDLCALC 155.0  08/01/2019    LDLCALC 117.6 03/16/2018     Lab Results   Component Value Date    TRIG 116 09/18/2020    TRIG 145 08/01/2019    TRIG 97 03/16/2018     Lab Results   Component Value Date    CHOLHDL 24.3 09/18/2020    CHOLHDL 21.0 08/01/2019    CHOLHDL 23.9 03/16/2018       Chemistry        Component Value Date/Time     09/18/2020 1010    K 4.0 09/18/2020 1010     09/18/2020 1010    CO2 29 09/18/2020 1010    BUN 24 (H) 09/18/2020 1010    CREATININE 1.7 (H) 09/18/2020 1010    GLU 95 09/18/2020 1010        Component Value Date/Time    CALCIUM 9.4 09/18/2020 1010    ALKPHOS 59 09/18/2020 1010    AST 20 09/18/2020 1010    ALT 16 09/18/2020 1010    BILITOT 0.6 09/18/2020 1010    ESTGFRAFRICA 31 (A) 09/18/2020 1010    EGFRNONAA 27 (A) 09/18/2020 1010          Lab Results   Component Value Date    HGBA1C 5.9 (H) 09/18/2020     Lab Results   Component Value Date    TSH 0.849 10/09/2019     Lab Results   Component Value Date    INR 1.0 02/12/2020    INR 1.0 01/25/2018    INR 1.0 01/01/2017     Lab Results   Component Value Date    WBC 6.43 09/18/2020    HGB 12.0 09/18/2020    HCT 36.7 (L) 09/18/2020     (H) 09/18/2020     09/18/2020     BMP  Sodium   Date Value Ref Range Status   09/18/2020 140 136 - 145 mmol/L Final     Potassium   Date Value Ref Range Status   09/18/2020 4.0 3.5 - 5.1 mmol/L Final     Chloride   Date Value Ref Range Status   09/18/2020 101 95 - 110 mmol/L Final     CO2   Date Value Ref Range Status   09/18/2020 29 23 - 29 mmol/L Final     BUN   Date Value Ref Range Status   09/18/2020 24 (H) 8 - 23 mg/dL Final     Creatinine   Date Value Ref Range Status   09/18/2020 1.7 (H) 0.5 - 1.4 mg/dL Final     Calcium   Date Value Ref Range Status   09/18/2020 9.4 8.7 - 10.5 mg/dL Final     Anion Gap   Date Value Ref Range Status   09/18/2020 10 8 - 16 mmol/L Final     eGFR if    Date Value Ref Range Status   09/18/2020 31 (A) >60 mL/min/1.73 m^2 Final     eGFR if non African  American   Date Value Ref Range Status   09/18/2020 27 (A) >60 mL/min/1.73 m^2 Final     Comment:     Calculation used to obtain the estimated glomerular filtration  rate (eGFR) is the CKD-EPI equation.        BNP  @LABRCNTIP(BNP,BNPTRIAGEBLO)@  @LABRCNTIP(troponini)@  CrCl cannot be calculated (Patient's most recent lab result is older than the maximum 7 days allowed.).  No results found in the last 24 hours.  No results found in the last 24 hours.  No results found in the last 24 hours.    Assessment:      1. Heart failure with preserved left ventricular function (HFpEF)    2. Essential hypertension    3. Mixed hyperlipidemia    4. Paroxysmal atrial fibrillation    5. Stage 3b chronic kidney disease    6. Anemia, unspecified type      Fluid overloaed    Plan:   Add Lasix 20 mg on MWF ONLY AS needed  BNP and BMP in 2 weeks  Continue lopressor 12.5 mg bid Losartan 25 mg bid, imdur 60 mg daily HCTZ 12.5 mg daily , clonidine 0.1 mg bid   Continue ELiquis 2.5 mg bid pacerone 200 mg daily  DM Rx per PCP    Counseled DASH  Check Lipid profile in 6 months  Recommend heart-healthy diet, weight control and regular exercise.  Shannen. Risk modification.   I have reviewed all pertinent labs and cardiac studies independently. Plans and recommendations have been formulated under my direct supervision. All questions answered and patient voiced understanding.   If symptoms persist go to the ED  RTC in 4 to 6 weeks

## 2020-12-07 ENCOUNTER — TELEPHONE (OUTPATIENT)
Dept: CARDIOLOGY | Facility: HOSPITAL | Age: 85
End: 2020-12-07

## 2020-12-07 RX ORDER — FUROSEMIDE 20 MG/1
20 TABLET ORAL
Qty: 12 TABLET | Refills: 11 | Status: CANCELLED | OUTPATIENT
Start: 2020-12-07 | End: 2021-12-07

## 2020-12-07 RX ORDER — FUROSEMIDE 20 MG/1
20 TABLET ORAL
Qty: 12 TABLET | Refills: 11 | Status: SHIPPED | OUTPATIENT
Start: 2020-12-07 | End: 2021-01-08 | Stop reason: SDUPTHER

## 2020-12-07 RX ORDER — FUROSEMIDE 20 MG/1
20 TABLET ORAL
Qty: 12 TABLET | Refills: 11 | Status: SHIPPED | OUTPATIENT
Start: 2020-12-07 | End: 2020-12-07 | Stop reason: SDUPTHER

## 2020-12-07 NOTE — TELEPHONE ENCOUNTER
----- Message from Alicja Xie sent at 12/7/2020 10:48 AM CST -----  Regarding: pharm. call  .Type:  Pharmacy Calling to Clarify an RX    Name of Caller:   Pharmacy Name:  [id number]  Prescription Name:alex   What do they need to clarify?:  needs new Rx   Best Call Back Number:# [phone]   Additional Information:

## 2020-12-07 NOTE — TELEPHONE ENCOUNTER
Patient stated did nit receive Lasix 20mg rx as was instructed. Patient informed rx was printed and left in office. Informed will call rx in to [facility] as requested.    Patient verbalized understanding.    Patient notified refill called in.

## 2020-12-07 NOTE — TELEPHONE ENCOUNTER
----- Message from Kaitlin Cuevas sent at 12/7/2020  8:17 AM CST -----  Contact: Shirley Rg  Pt called in regarding speaking to the nurse about her medication that she was supposed to be taking. She stated it did not go through. Please give her a call back at [phone].    Thanks,  Pb

## 2020-12-11 ENCOUNTER — PATIENT MESSAGE (OUTPATIENT)
Dept: OTHER | Facility: OTHER | Age: 85
End: 2020-12-11

## 2020-12-12 NOTE — LETTER
July 27, 2017        Yandy Terrell MD  [address] [place] [address] [address] [place] [id number]             [facility]  [address] [facility] [place] [address]  Phone: [phone]  Fax: [phone]   Patient: Shirley Metz   MR Number: [id number]   YOB: 1932   Date of Visit: 7/27/2017       Dear Dr. Terrell:    Thank you for referring Shirley Metz to me for evaluation. Below are the relevant portions of my assessment and plan of care.            If you have questions, please do not hesitate to call me. I look forward to following Shirley along with you.    Sincerely,      Michael Arvizu, ARVIN           CC  No Recipients       
Chance Randle

## 2020-12-15 ENCOUNTER — TELEPHONE (OUTPATIENT)
Dept: DERMATOLOGY | Facility: CLINIC | Age: 85
End: 2020-12-15

## 2020-12-15 ENCOUNTER — PATIENT MESSAGE (OUTPATIENT)
Dept: DERMATOLOGY | Facility: CLINIC | Age: 85
End: 2020-12-15

## 2020-12-18 ENCOUNTER — LAB VISIT (OUTPATIENT)
Dept: LAB | Facility: HOSPITAL | Age: 85
End: 2020-12-18
Attending: INTERNAL MEDICINE
Payer: MEDICARE

## 2020-12-18 DIAGNOSIS — I50.30 HEART FAILURE WITH PRESERVED LEFT VENTRICULAR FUNCTION (HFPEF): ICD-10-CM

## 2020-12-18 LAB
ANION GAP SERPL CALC-SCNC: 11 MMOL/L (ref 8–16)
BUN SERPL-MCNC: 29 MG/DL (ref 8–23)
CALCIUM SERPL-MCNC: 9.5 MG/DL (ref 8.7–10.5)
CHLORIDE SERPL-SCNC: 106 MMOL/L (ref 95–110)
CO2 SERPL-SCNC: 28 MMOL/L (ref 23–29)
CREAT SERPL-MCNC: 1.6 MG/DL (ref 0.5–1.4)
EST. GFR  (AFRICAN AMERICAN): 32.9 ML/MIN/1.73 M^2
EST. GFR  (NON AFRICAN AMERICAN): 28.6 ML/MIN/1.73 M^2
GLUCOSE SERPL-MCNC: 99 MG/DL (ref 70–110)
POTASSIUM SERPL-SCNC: 4 MMOL/L (ref 3.5–5.1)
SODIUM SERPL-SCNC: 145 MMOL/L (ref 136–145)

## 2020-12-18 PROCEDURE — 36415 COLL VENOUS BLD VENIPUNCTURE: CPT | Mod: HCNC

## 2020-12-18 PROCEDURE — 80048 BASIC METABOLIC PNL TOTAL CA: CPT | Mod: HCNC

## 2020-12-21 DIAGNOSIS — I11.9 HYPERTENSIVE LEFT VENTRICULAR HYPERTROPHY, WITHOUT HEART FAILURE: Chronic | ICD-10-CM

## 2020-12-21 DIAGNOSIS — I10 ESSENTIAL HYPERTENSION: Primary | Chronic | ICD-10-CM

## 2020-12-22 ENCOUNTER — LAB VISIT (OUTPATIENT)
Dept: LAB | Facility: HOSPITAL | Age: 85
End: 2020-12-22
Attending: INTERNAL MEDICINE
Payer: MEDICARE

## 2020-12-22 DIAGNOSIS — I10 ESSENTIAL HYPERTENSION: Chronic | ICD-10-CM

## 2020-12-22 DIAGNOSIS — I11.9 HYPERTENSIVE LEFT VENTRICULAR HYPERTROPHY, WITHOUT HEART FAILURE: Chronic | ICD-10-CM

## 2020-12-22 PROCEDURE — 83880 ASSAY OF NATRIURETIC PEPTIDE: CPT | Mod: HCNC

## 2020-12-22 PROCEDURE — 36415 COLL VENOUS BLD VENIPUNCTURE: CPT | Mod: HCNC

## 2020-12-24 LAB — NT-PROBNP SERPL-MCNC: 760 PG/ML

## 2020-12-29 ENCOUNTER — TELEPHONE (OUTPATIENT)
Dept: CARDIOLOGY | Facility: CLINIC | Age: 85
End: 2020-12-29

## 2020-12-29 NOTE — TELEPHONE ENCOUNTER
Pt states that the lasix is causing her to have muscle weakness and she cant get up or walk without assistance. She is wanting to stop the lasix. Please advise pb

## 2020-12-29 NOTE — TELEPHONE ENCOUNTER
Pt notified of results     ----- Message from Samy Castillo MD sent at 12/28/2020 10:59 PM CST -----  Lab showed no CHF

## 2020-12-30 ENCOUNTER — OFFICE VISIT (OUTPATIENT)
Dept: DERMATOLOGY | Facility: CLINIC | Age: 85
End: 2020-12-30
Payer: MEDICARE

## 2020-12-30 VITALS — WEIGHT: 183.63 LBS | HEIGHT: 64 IN | BODY MASS INDEX: 31.35 KG/M2

## 2020-12-30 DIAGNOSIS — Z12.83 SCREENING EXAM FOR SKIN CANCER: ICD-10-CM

## 2020-12-30 DIAGNOSIS — L90.5 SCAR CONDITIONS/SKIN FIBROSIS: Primary | ICD-10-CM

## 2020-12-30 DIAGNOSIS — L71.9 ROSACEA: ICD-10-CM

## 2020-12-30 DIAGNOSIS — Z12.83 SCREENING, MALIGNANT NEOPLASM, SKIN: ICD-10-CM

## 2020-12-30 PROCEDURE — 99214 OFFICE O/P EST MOD 30 MIN: CPT | Mod: HCNC,S$GLB,, | Performed by: DERMATOLOGY

## 2020-12-30 PROCEDURE — 99999 PR PBB SHADOW E&M-EST. PATIENT-LVL IV: CPT | Mod: PBBFAC,HCNC,, | Performed by: DERMATOLOGY

## 2020-12-30 PROCEDURE — 99999 PR PBB SHADOW E&M-EST. PATIENT-LVL IV: ICD-10-PCS | Mod: PBBFAC,HCNC,, | Performed by: DERMATOLOGY

## 2020-12-30 PROCEDURE — 3288F FALL RISK ASSESSMENT DOCD: CPT | Mod: HCNC,CPTII,S$GLB, | Performed by: DERMATOLOGY

## 2020-12-30 PROCEDURE — 1101F PT FALLS ASSESS-DOCD LE1/YR: CPT | Mod: HCNC,CPTII,S$GLB, | Performed by: DERMATOLOGY

## 2020-12-30 PROCEDURE — 1159F MED LIST DOCD IN RCRD: CPT | Mod: HCNC,S$GLB,, | Performed by: DERMATOLOGY

## 2020-12-30 PROCEDURE — 99214 PR OFFICE/OUTPT VISIT, EST, LEVL IV, 30-39 MIN: ICD-10-PCS | Mod: HCNC,S$GLB,, | Performed by: DERMATOLOGY

## 2020-12-30 PROCEDURE — 1159F PR MEDICATION LIST DOCUMENTED IN MEDICAL RECORD: ICD-10-PCS | Mod: HCNC,S$GLB,, | Performed by: DERMATOLOGY

## 2020-12-30 PROCEDURE — 3288F PR FALLS RISK ASSESSMENT DOCUMENTED: ICD-10-PCS | Mod: HCNC,CPTII,S$GLB, | Performed by: DERMATOLOGY

## 2020-12-30 PROCEDURE — 1101F PR PT FALLS ASSESS DOC 0-1 FALLS W/OUT INJ PAST YR: ICD-10-PCS | Mod: HCNC,CPTII,S$GLB, | Performed by: DERMATOLOGY

## 2020-12-30 RX ORDER — METRONIDAZOLE 7.5 MG/G
CREAM TOPICAL
Qty: 45 G | Refills: 5 | Status: SHIPPED | OUTPATIENT
Start: 2020-12-30 | End: 2021-12-15

## 2020-12-30 NOTE — PATIENT INSTRUCTIONS
Rosacea  Rosacea is a long-lasting (chronic) skin condition affecting the face. In the early stages it causes easy flushing or blushing. Redness may become long-term (permanent) as the small blood vessels of the face widen (dilate). There may be small, red, pus-filled bumps (pustules). It looks like a bad case of acne, and has been called adult acne. But it is not caused by the same things that cause acne.  Rosacea is a chronic illness. You will have flare-ups that come and go. This may happen every few weeks or every few months. Experts don't know what causes rosacea. If not treated, it tends to get worse over time.  Some men with a more severe form of rosacea develop a condition called rhinophyma. The oil glands on the skin of the nose become blocked and the nose gets bigger. The cheeks also become puffy. Alcohol may increase the flushing. But this condition is not caused by alcohol use.  Rosacea can be treated with topical gels and creams. Oral antibiotics are used for more severe cases. You should see improvement in the first 4 weeks. Dilated blood vessels can be treated with a small electric needle or laser surgery. Rhinophyma can be treated with surgery. Or it may be treated by surgically scraping the skin (dermabrasion).  Home care  · Avoid things that make your face red or flushed. These include hot drinks, spicy foods, caffeine, and alcohol.  · Exercise indoors or in a cool area to avoid getting overheated.  · Avoid excess sun exposure. Use a sunscreen with at least SPF 15 or higher.  · Avoid extreme hot or extreme cold weather.  · Don't scrub your face. That will irritate the skin and increase redness.  · Avoid harsh soaps or moisturizers with irritating ingredients. You may need to use hypoallergenic cosmetics.  · Avoid over-the-counter treatments unless instructed by your healthcare provider. Some of these treatments may make rosacea worse.  · Try to figure out what triggers your flares (such as  stress, sun exposure, or certain foods).  Follow-up care  Follow up with your healthcare provider, or as advised. Contact your provider if your condition is not responding to the medicines you were given. Getting treatment early can stop it from getting worse.  When to seek medical advice  Call your healthcare provider right away if you have redness, burning, or a gritty feeling in your eyes.  Date Last Reviewed: 8/1/2016  © 6952-0951 [employer]. [address] [place] [address], [place] [address]. All rights reserved. This information is not intended as a substitute for professional medical care. Always follow your healthcare professional's instructions.        Treating Rosacea     Use sunscreen with at least SPF 15 or more every day.      There is no cure for rosacea. But rosacea can be controlled in most cases, particularly with medical treatment to manage your symptoms.  Your healthcare provider may prescribe 1 or more treatments to put on your skin each day. You may also be given medicines to take by mouth if you have more severe rosacea symptoms. To relieve rosacea eye symptoms, you may need prescription eye drops. Avoid things that can easily cause your rosacea to flare up. These include spicy foods, alcohol, getting embarrassed, and going from a cold environment to a warm environment. You may have surgery to fix the more severe forms of scarring rosacea of the nose. This is called rhinophyma and means the redness and swelling that cause the nose to enlarge.  Your role in medical treatment  How well your treatment works depends partly on you. Follow your healthcare providers treatment plan. Rosacea symptoms often get better with medicines. But they tend to get worse again if you stop taking the medicines. If your symptoms continue or get worse, ask about other treatment options, including combinations of treatments.  Rosacea self-care  Besides sticking with your treatment plan, follow these tips to care  for your skin:  · Wash your face twice a day with a gentle facial cleanser. Rinse your skin well with warm (not hot) water. Pat your skin dry with a cotton towel.  · Dont scrub your skin or use sponges, brushes, or other abrasive tools. Doing so can irritate your skin.  · Avoid harsh scrubs or astringents. These products can irritate your skin.  · If you shave your face, use an electric razor.  · Apply sunscreen with at least SPF 15 or more every day. Sun exposure can make rosacea symptoms worse.  · Choose skin care products and cosmetics that do not irritate the skin, and are oil-free and fragrance-free.  · Avoid putting steroids on the skin sores. Steroids may make rosacea worse.   Getting good results  Learning about rosacea and treating it early is the first step toward controlling this disease. With proper treatment and self-care, you can manage your symptoms and feel better about your skin. The National Rosacea Society is a great resource for information.   What causes rosacea flare-ups?  Its often hard to pinpoint the factors that cause rosacea flare-ups. Different people can be affected by different triggers. Common triggers include weather extremes, sun exposure, alcoholic or hot beverages, spicy food, physical exertion, stress, illness, some skin products, and medicines. To prevent flare-ups, keep a list of things that seem to make your rosacea worse. Then try to avoid them.  Date Last Reviewed: 2/1/2017  © 0943-1424 [employer]. [address] [place] [address], [place] [address]. All rights reserved. This information is not intended as a substitute for professional medical care. Always follow your healthcare professional's instructions.

## 2020-12-30 NOTE — PROGRESS NOTES
Subjective:       Patient ID:  Shirley Metz is a 88 y.o. female who presents for   Chief Complaint   Patient presents with    Skin Check     Skin check on face     Hx of SCC left posterior shoulder (s/p excision 1/22/19) and BCC of the left mid-back (s/p ED&C on 5/21/18, s/p re-biopsy of ED&C site on 1/22/19 showing keloid scar no evidence of recurrence), last seen on 5/13/20.  She c/o lesion of the scalp, + irritation. No tx tried.   This is a high risk patient here to check for the development of new lesions. Denies bleeding, tender, growing, or concerning lesions.       Review of Systems   Constitutional: Negative for fever and chills.   Gastrointestinal: Negative for nausea and vomiting.   Skin: Positive for activity-related sunscreen use. Negative for daily sunscreen use and recent sunburn.   Hematologic/Lymphatic: Does not bruise/bleed easily.        Objective:    Physical Exam   Constitutional: She appears well-developed and well-nourished. No distress.   Neurological: She is alert and oriented to person, place, and time. She is not disoriented.   Psychiatric: She has a normal mood and affect.   Skin:   Areas Examined (abnormalities noted in diagram):   Head / Face Inspection Performed  Neck Inspection Performed  Chest / Axilla Inspection Performed  Abdomen Inspection Performed  Back Inspection Performed  RUE Inspected  LUE Inspection Performed  Nails and Digits Inspection Performed                   Diagram Legend     Erythematous scaling macule/papule c/w actinic keratosis       Vascular papule c/w angioma      Pigmented verrucoid papule/plaque c/w seborrheic keratosis      Yellow umbilicated papule c/w sebaceous hyperplasia      Irregularly shaped tan macule c/w lentigo     1-2 mm smooth white papules consistent with Milia      Movable subcutaneous cyst with punctum c/w epidermal inclusion cyst      Subcutaneous movable cyst c/w pilar cyst      Firm pink to brown papule c/w dermatofibroma       Pedunculated fleshy papule(s) c/w skin tag(s)      Evenly pigmented macule c/w junctional nevus     Mildly variegated pigmented, slightly irregular-bordered macule c/w mildly atypical nevus      Flesh colored to evenly pigmented papule c/w intradermal nevus       Pink pearly papule/plaque c/w basal cell carcinoma      Erythematous hyperkeratotic cursted plaque c/w SCC      Surgical scar with no sign of skin cancer recurrence      Open and closed comedones      Inflammatory papules and pustules      Verrucoid papule consistent consistent with wart     Erythematous eczematous patches and plaques     Dystrophic onycholytic nail with subungual debris c/w onychomycosis     Umbilicated papule    Erythematous-base heme-crusted tan verrucoid plaque consistent with inflamed seborrheic keratosis     Erythematous Silvery Scaling Plaque c/w Psoriasis     See annotation      Assessment / Plan:        Scar conditions/skin fibrosis  Screening, malignant neoplasm, skin  Screening exam for skin cancer  Scar of the left posterior shoulder and left mid-back, hx of NMSC.  No evidence of recurrence on physical exam today.  Continue routine skin surveillance. Daily sunscreen advised.      Rosacea  -     metronidazole 0.75% (METROCREAM) 0.75 % Crea; AAA bid for rosacea/redness  Dispense: 45 g; Refill: 5  -     Discussed dx, AVS given.               Follow up in about 6 months (around 6/30/2021).

## 2021-01-05 ENCOUNTER — IMMUNIZATION (OUTPATIENT)
Dept: INTERNAL MEDICINE | Facility: CLINIC | Age: 86
End: 2021-01-05
Payer: MEDICARE

## 2021-01-05 DIAGNOSIS — Z23 NEED FOR VACCINATION: ICD-10-CM

## 2021-01-05 PROCEDURE — 91300 COVID-19, MRNA, LNP-S, PF, 30 MCG/0.3 ML DOSE VACCINE: CPT | Mod: PBBFAC | Performed by: FAMILY MEDICINE

## 2021-01-08 ENCOUNTER — OFFICE VISIT (OUTPATIENT)
Dept: CARDIOLOGY | Facility: CLINIC | Age: 86
End: 2021-01-08
Payer: MEDICARE

## 2021-01-08 VITALS
BODY MASS INDEX: 31.67 KG/M2 | SYSTOLIC BLOOD PRESSURE: 148 MMHG | OXYGEN SATURATION: 96 % | WEIGHT: 184.5 LBS | DIASTOLIC BLOOD PRESSURE: 78 MMHG | HEART RATE: 64 BPM

## 2021-01-08 DIAGNOSIS — I10 ESSENTIAL HYPERTENSION: Primary | Chronic | ICD-10-CM

## 2021-01-08 DIAGNOSIS — I48.0 PAROXYSMAL ATRIAL FIBRILLATION: ICD-10-CM

## 2021-01-08 DIAGNOSIS — I50.30 HEART FAILURE WITH PRESERVED LEFT VENTRICULAR FUNCTION (HFPEF): ICD-10-CM

## 2021-01-08 DIAGNOSIS — I51.3 THROMBUS OF LEFT ATRIAL APPENDAGE: ICD-10-CM

## 2021-01-08 PROCEDURE — 99214 PR OFFICE/OUTPT VISIT, EST, LEVL IV, 30-39 MIN: ICD-10-PCS | Mod: HCNC,S$GLB,, | Performed by: INTERNAL MEDICINE

## 2021-01-08 PROCEDURE — 99999 PR PBB SHADOW E&M-EST. PATIENT-LVL IV: CPT | Mod: PBBFAC,HCNC,, | Performed by: INTERNAL MEDICINE

## 2021-01-08 PROCEDURE — 1159F MED LIST DOCD IN RCRD: CPT | Mod: HCNC,S$GLB,, | Performed by: INTERNAL MEDICINE

## 2021-01-08 PROCEDURE — 99999 PR PBB SHADOW E&M-EST. PATIENT-LVL IV: ICD-10-PCS | Mod: PBBFAC,HCNC,, | Performed by: INTERNAL MEDICINE

## 2021-01-08 PROCEDURE — 99214 OFFICE O/P EST MOD 30 MIN: CPT | Mod: HCNC,S$GLB,, | Performed by: INTERNAL MEDICINE

## 2021-01-08 PROCEDURE — 1159F PR MEDICATION LIST DOCUMENTED IN MEDICAL RECORD: ICD-10-PCS | Mod: HCNC,S$GLB,, | Performed by: INTERNAL MEDICINE

## 2021-01-08 RX ORDER — FUROSEMIDE 20 MG/1
20 TABLET ORAL WEEKLY
Qty: 4 TABLET | Refills: 11 | Status: SHIPPED | OUTPATIENT
Start: 2021-01-08 | End: 2021-01-08 | Stop reason: SDUPTHER

## 2021-01-08 RX ORDER — LOSARTAN POTASSIUM 50 MG/1
50 TABLET ORAL 2 TIMES DAILY
Qty: 60 TABLET | Refills: 5 | Status: SHIPPED | OUTPATIENT
Start: 2021-01-08 | End: 2021-03-25 | Stop reason: SDUPTHER

## 2021-01-08 RX ORDER — FUROSEMIDE 20 MG/1
20 TABLET ORAL WEEKLY
Qty: 4 TABLET | Refills: 11 | Status: SHIPPED | OUTPATIENT
Start: 2021-01-08 | End: 2021-03-25 | Stop reason: SDUPTHER

## 2021-01-26 ENCOUNTER — IMMUNIZATION (OUTPATIENT)
Dept: INTERNAL MEDICINE | Facility: CLINIC | Age: 86
End: 2021-01-26
Payer: MEDICARE

## 2021-01-26 DIAGNOSIS — Z23 NEED FOR VACCINATION: Primary | ICD-10-CM

## 2021-01-26 PROCEDURE — 0002A COVID-19, MRNA, LNP-S, PF, 30 MCG/0.3 ML DOSE VACCINE: CPT | Mod: PBBFAC | Performed by: FAMILY MEDICINE

## 2021-01-26 PROCEDURE — 91300 COVID-19, MRNA, LNP-S, PF, 30 MCG/0.3 ML DOSE VACCINE: CPT | Mod: PBBFAC | Performed by: FAMILY MEDICINE

## 2021-02-01 ENCOUNTER — TELEPHONE (OUTPATIENT)
Dept: INTERNAL MEDICINE | Facility: CLINIC | Age: 86
End: 2021-02-01

## 2021-02-22 ENCOUNTER — OFFICE VISIT (OUTPATIENT)
Dept: PRIMARY CARE CLINIC | Facility: CLINIC | Age: 86
End: 2021-02-22
Payer: MEDICARE

## 2021-02-22 VITALS
OXYGEN SATURATION: 93 % | WEIGHT: 190.06 LBS | BODY MASS INDEX: 32.45 KG/M2 | HEIGHT: 64 IN | DIASTOLIC BLOOD PRESSURE: 82 MMHG | SYSTOLIC BLOOD PRESSURE: 180 MMHG | TEMPERATURE: 98 F | HEART RATE: 72 BPM

## 2021-02-22 DIAGNOSIS — I70.0 CALCIFICATION OF AORTA: ICD-10-CM

## 2021-02-22 DIAGNOSIS — I50.30 HEART FAILURE WITH PRESERVED LEFT VENTRICULAR FUNCTION (HFPEF): Primary | ICD-10-CM

## 2021-02-22 DIAGNOSIS — N18.4 CKD STAGE 4 SECONDARY TO HYPERTENSION: ICD-10-CM

## 2021-02-22 DIAGNOSIS — I12.9 CKD STAGE 4 SECONDARY TO HYPERTENSION: ICD-10-CM

## 2021-02-22 DIAGNOSIS — I51.3 THROMBUS OF LEFT ATRIAL APPENDAGE: ICD-10-CM

## 2021-02-22 DIAGNOSIS — I10 ESSENTIAL HYPERTENSION: Chronic | ICD-10-CM

## 2021-02-22 DIAGNOSIS — E11.9 TYPE 2 DIABETES MELLITUS WITHOUT COMPLICATION, WITHOUT LONG-TERM CURRENT USE OF INSULIN: ICD-10-CM

## 2021-02-22 DIAGNOSIS — I48.19 PERSISTENT ATRIAL FIBRILLATION: ICD-10-CM

## 2021-02-22 DIAGNOSIS — M35.01 KERATITIS SICCA, BILATERAL: ICD-10-CM

## 2021-02-22 PROCEDURE — 1159F MED LIST DOCD IN RCRD: CPT | Mod: S$GLB,,, | Performed by: NURSE PRACTITIONER

## 2021-02-22 PROCEDURE — 99499 UNLISTED E&M SERVICE: CPT | Mod: S$GLB,,, | Performed by: NURSE PRACTITIONER

## 2021-02-22 PROCEDURE — 99215 OFFICE O/P EST HI 40 MIN: CPT | Mod: S$GLB,,, | Performed by: NURSE PRACTITIONER

## 2021-02-22 PROCEDURE — 99999 PR PBB SHADOW E&M-EST. PATIENT-LVL III: CPT | Mod: PBBFAC,,, | Performed by: NURSE PRACTITIONER

## 2021-02-22 PROCEDURE — 1159F PR MEDICATION LIST DOCUMENTED IN MEDICAL RECORD: ICD-10-PCS | Mod: S$GLB,,, | Performed by: NURSE PRACTITIONER

## 2021-02-22 PROCEDURE — 99215 PR OFFICE/OUTPT VISIT, EST, LEVL V, 40-54 MIN: ICD-10-PCS | Mod: S$GLB,,, | Performed by: NURSE PRACTITIONER

## 2021-02-22 PROCEDURE — 99999 PR PBB SHADOW E&M-EST. PATIENT-LVL III: ICD-10-PCS | Mod: PBBFAC,,, | Performed by: NURSE PRACTITIONER

## 2021-02-22 PROCEDURE — 99499 RISK ADDL DX/OHS AUDIT: ICD-10-PCS | Mod: S$GLB,,, | Performed by: NURSE PRACTITIONER

## 2021-02-24 PROBLEM — N18.4 CKD STAGE 4 SECONDARY TO HYPERTENSION: Status: ACTIVE | Noted: 2021-02-24

## 2021-02-24 PROBLEM — H16.223 KERATITIS SICCA, BILATERAL: Status: ACTIVE | Noted: 2021-02-24

## 2021-02-24 PROBLEM — I12.9 CKD STAGE 4 SECONDARY TO HYPERTENSION: Status: ACTIVE | Noted: 2021-02-24

## 2021-02-24 PROBLEM — E11.9 TYPE 2 DIABETES MELLITUS WITHOUT COMPLICATION, WITHOUT LONG-TERM CURRENT USE OF INSULIN: Status: ACTIVE | Noted: 2021-02-24

## 2021-02-24 PROBLEM — M35.01 KERATITIS SICCA, BILATERAL: Status: ACTIVE | Noted: 2021-02-24

## 2021-03-08 ENCOUNTER — LAB VISIT (OUTPATIENT)
Dept: LAB | Facility: HOSPITAL | Age: 86
End: 2021-03-08
Attending: FAMILY MEDICINE
Payer: MEDICARE

## 2021-03-08 ENCOUNTER — OFFICE VISIT (OUTPATIENT)
Dept: OPHTHALMOLOGY | Facility: CLINIC | Age: 86
End: 2021-03-08
Payer: MEDICARE

## 2021-03-08 DIAGNOSIS — M35.01 KERATITIS SICCA, BILATERAL: ICD-10-CM

## 2021-03-08 DIAGNOSIS — Z94.7 HISTORY OF CORNEA TRANSPLANT: ICD-10-CM

## 2021-03-08 DIAGNOSIS — H40.1131 PRIMARY OPEN ANGLE GLAUCOMA (POAG) OF BOTH EYES, MILD STAGE: Primary | ICD-10-CM

## 2021-03-08 DIAGNOSIS — R73.03 PRE-DIABETES: ICD-10-CM

## 2021-03-08 DIAGNOSIS — I10 ESSENTIAL HYPERTENSION: Chronic | ICD-10-CM

## 2021-03-08 DIAGNOSIS — Z96.1 PSEUDOPHAKIA: ICD-10-CM

## 2021-03-08 DIAGNOSIS — I50.30 HEART FAILURE WITH PRESERVED LEFT VENTRICULAR FUNCTION (HFPEF): ICD-10-CM

## 2021-03-08 LAB
ALBUMIN SERPL BCP-MCNC: 3.7 G/DL (ref 3.5–5.2)
ALP SERPL-CCNC: 63 U/L (ref 55–135)
ALT SERPL W/O P-5'-P-CCNC: 15 U/L (ref 10–44)
ANION GAP SERPL CALC-SCNC: 10 MMOL/L (ref 8–16)
AST SERPL-CCNC: 18 U/L (ref 10–40)
BILIRUB SERPL-MCNC: 0.6 MG/DL (ref 0.1–1)
BNP SERPL-MCNC: 199 PG/ML (ref 0–99)
BUN SERPL-MCNC: 31 MG/DL (ref 8–23)
CALCIUM SERPL-MCNC: 9.7 MG/DL (ref 8.7–10.5)
CHLORIDE SERPL-SCNC: 103 MMOL/L (ref 95–110)
CO2 SERPL-SCNC: 30 MMOL/L (ref 23–29)
CREAT SERPL-MCNC: 1.8 MG/DL (ref 0.5–1.4)
EST. GFR  (AFRICAN AMERICAN): 28.6 ML/MIN/1.73 M^2
EST. GFR  (NON AFRICAN AMERICAN): 24.8 ML/MIN/1.73 M^2
GLUCOSE SERPL-MCNC: 111 MG/DL (ref 70–110)
POTASSIUM SERPL-SCNC: 4 MMOL/L (ref 3.5–5.1)
PROT SERPL-MCNC: 7.6 G/DL (ref 6–8.4)
SODIUM SERPL-SCNC: 143 MMOL/L (ref 136–145)

## 2021-03-08 PROCEDURE — 92133 POSTERIOR SEGMENT OCT OPTIC NERVE(OCULAR COHERENCE TOMOGRAPHY) - OU - BOTH EYES: ICD-10-PCS | Mod: S$GLB,,, | Performed by: OPHTHALMOLOGY

## 2021-03-08 PROCEDURE — 1159F PR MEDICATION LIST DOCUMENTED IN MEDICAL RECORD: ICD-10-PCS | Mod: S$GLB,,, | Performed by: OPHTHALMOLOGY

## 2021-03-08 PROCEDURE — 80053 COMPREHEN METABOLIC PANEL: CPT | Performed by: NURSE PRACTITIONER

## 2021-03-08 PROCEDURE — 92133 CPTRZD OPH DX IMG PST SGM ON: CPT | Mod: S$GLB,,, | Performed by: OPHTHALMOLOGY

## 2021-03-08 PROCEDURE — 99999 PR PBB SHADOW E&M-EST. PATIENT-LVL III: ICD-10-PCS | Mod: PBBFAC,,, | Performed by: OPHTHALMOLOGY

## 2021-03-08 PROCEDURE — 1159F MED LIST DOCD IN RCRD: CPT | Mod: S$GLB,,, | Performed by: OPHTHALMOLOGY

## 2021-03-08 PROCEDURE — 83880 ASSAY OF NATRIURETIC PEPTIDE: CPT | Performed by: NURSE PRACTITIONER

## 2021-03-08 PROCEDURE — 99999 PR PBB SHADOW E&M-EST. PATIENT-LVL III: CPT | Mod: PBBFAC,,, | Performed by: OPHTHALMOLOGY

## 2021-03-08 PROCEDURE — 83036 HEMOGLOBIN GLYCOSYLATED A1C: CPT | Performed by: FAMILY MEDICINE

## 2021-03-08 PROCEDURE — 36415 COLL VENOUS BLD VENIPUNCTURE: CPT | Performed by: NURSE PRACTITIONER

## 2021-03-08 PROCEDURE — 99214 PR OFFICE/OUTPT VISIT, EST, LEVL IV, 30-39 MIN: ICD-10-PCS | Mod: S$GLB,,, | Performed by: OPHTHALMOLOGY

## 2021-03-08 PROCEDURE — 99214 OFFICE O/P EST MOD 30 MIN: CPT | Mod: S$GLB,,, | Performed by: OPHTHALMOLOGY

## 2021-03-09 LAB
ESTIMATED AVG GLUCOSE: 123 MG/DL (ref 68–131)
HBA1C MFR BLD: 5.9 % (ref 4–5.6)

## 2021-03-17 ENCOUNTER — LAB VISIT (OUTPATIENT)
Dept: LAB | Facility: HOSPITAL | Age: 86
End: 2021-03-17
Attending: NURSE PRACTITIONER
Payer: MEDICARE

## 2021-03-17 ENCOUNTER — OFFICE VISIT (OUTPATIENT)
Dept: PRIMARY CARE CLINIC | Facility: CLINIC | Age: 86
End: 2021-03-17
Payer: MEDICARE

## 2021-03-17 VITALS
HEIGHT: 64 IN | HEART RATE: 63 BPM | SYSTOLIC BLOOD PRESSURE: 162 MMHG | TEMPERATURE: 99 F | WEIGHT: 190.25 LBS | BODY MASS INDEX: 32.48 KG/M2 | OXYGEN SATURATION: 95 % | DIASTOLIC BLOOD PRESSURE: 84 MMHG

## 2021-03-17 DIAGNOSIS — R31.9 HEMATURIA OF UNKNOWN CAUSE: ICD-10-CM

## 2021-03-17 DIAGNOSIS — I10 ESSENTIAL HYPERTENSION: Chronic | ICD-10-CM

## 2021-03-17 DIAGNOSIS — I12.9 CKD STAGE 4 SECONDARY TO HYPERTENSION: ICD-10-CM

## 2021-03-17 DIAGNOSIS — N18.4 CKD STAGE 4 SECONDARY TO HYPERTENSION: Primary | ICD-10-CM

## 2021-03-17 DIAGNOSIS — R39.82 CHRONIC BLADDER PAIN: ICD-10-CM

## 2021-03-17 DIAGNOSIS — R31.29 OTHER MICROSCOPIC HEMATURIA: ICD-10-CM

## 2021-03-17 DIAGNOSIS — I12.9 CKD STAGE 4 SECONDARY TO HYPERTENSION: Primary | ICD-10-CM

## 2021-03-17 DIAGNOSIS — N18.4 CKD STAGE 4 SECONDARY TO HYPERTENSION: ICD-10-CM

## 2021-03-17 LAB
BACTERIA #/AREA URNS HPF: NORMAL /HPF
BILIRUB UR QL STRIP: NEGATIVE
CLARITY UR: ABNORMAL
COLOR UR: YELLOW
GLUCOSE UR QL STRIP: NEGATIVE
HGB UR QL STRIP: ABNORMAL
HYALINE CASTS #/AREA URNS LPF: 0 /LPF
KETONES UR QL STRIP: NEGATIVE
LEUKOCYTE ESTERASE UR QL STRIP: NEGATIVE
MICROSCOPIC COMMENT: NORMAL
NITRITE UR QL STRIP: NEGATIVE
PH UR STRIP: 7 [PH] (ref 5–8)
PROT UR QL STRIP: ABNORMAL
RBC #/AREA URNS HPF: 2 /HPF (ref 0–4)
SP GR UR STRIP: 1.01 (ref 1–1.03)
SQUAMOUS #/AREA URNS HPF: 1 /HPF
URN SPEC COLLECT METH UR: ABNORMAL
WBC #/AREA URNS HPF: 1 /HPF (ref 0–5)

## 2021-03-17 PROCEDURE — 3288F FALL RISK ASSESSMENT DOCD: CPT | Mod: CPTII,S$GLB,, | Performed by: NURSE PRACTITIONER

## 2021-03-17 PROCEDURE — 1101F PR PT FALLS ASSESS DOC 0-1 FALLS W/OUT INJ PAST YR: ICD-10-PCS | Mod: CPTII,S$GLB,, | Performed by: NURSE PRACTITIONER

## 2021-03-17 PROCEDURE — 99215 OFFICE O/P EST HI 40 MIN: CPT | Mod: S$GLB,,, | Performed by: NURSE PRACTITIONER

## 2021-03-17 PROCEDURE — 1159F MED LIST DOCD IN RCRD: CPT | Mod: S$GLB,,, | Performed by: NURSE PRACTITIONER

## 2021-03-17 PROCEDURE — 81000 URINALYSIS NONAUTO W/SCOPE: CPT | Performed by: NURSE PRACTITIONER

## 2021-03-17 PROCEDURE — 99999 PR PBB SHADOW E&M-EST. PATIENT-LVL V: ICD-10-PCS | Mod: PBBFAC,,, | Performed by: NURSE PRACTITIONER

## 2021-03-17 PROCEDURE — 3288F PR FALLS RISK ASSESSMENT DOCUMENTED: ICD-10-PCS | Mod: CPTII,S$GLB,, | Performed by: NURSE PRACTITIONER

## 2021-03-17 PROCEDURE — 1125F PR PAIN SEVERITY QUANTIFIED, PAIN PRESENT: ICD-10-PCS | Mod: S$GLB,,, | Performed by: NURSE PRACTITIONER

## 2021-03-17 PROCEDURE — 99215 PR OFFICE/OUTPT VISIT, EST, LEVL V, 40-54 MIN: ICD-10-PCS | Mod: S$GLB,,, | Performed by: NURSE PRACTITIONER

## 2021-03-17 PROCEDURE — 1101F PT FALLS ASSESS-DOCD LE1/YR: CPT | Mod: CPTII,S$GLB,, | Performed by: NURSE PRACTITIONER

## 2021-03-17 PROCEDURE — 1159F PR MEDICATION LIST DOCUMENTED IN MEDICAL RECORD: ICD-10-PCS | Mod: S$GLB,,, | Performed by: NURSE PRACTITIONER

## 2021-03-17 PROCEDURE — 99999 PR PBB SHADOW E&M-EST. PATIENT-LVL V: CPT | Mod: PBBFAC,,, | Performed by: NURSE PRACTITIONER

## 2021-03-17 PROCEDURE — 1125F AMNT PAIN NOTED PAIN PRSNT: CPT | Mod: S$GLB,,, | Performed by: NURSE PRACTITIONER

## 2021-03-18 ENCOUNTER — PATIENT MESSAGE (OUTPATIENT)
Dept: PRIMARY CARE CLINIC | Facility: CLINIC | Age: 86
End: 2021-03-18

## 2021-03-23 DIAGNOSIS — I11.9 HYPERTENSIVE LEFT VENTRICULAR HYPERTROPHY, WITHOUT HEART FAILURE: ICD-10-CM

## 2021-03-23 DIAGNOSIS — R94.31 ABNORMAL ECG: ICD-10-CM

## 2021-03-23 DIAGNOSIS — I50.30 HEART FAILURE WITH PRESERVED LEFT VENTRICULAR FUNCTION (HFPEF): ICD-10-CM

## 2021-03-23 DIAGNOSIS — I48.19 PERSISTENT ATRIAL FIBRILLATION: ICD-10-CM

## 2021-03-23 DIAGNOSIS — I10 ESSENTIAL HYPERTENSION: Primary | ICD-10-CM

## 2021-03-23 DIAGNOSIS — E78.2 MIXED HYPERLIPIDEMIA: ICD-10-CM

## 2021-03-23 DIAGNOSIS — I49.3 PVC'S (PREMATURE VENTRICULAR CONTRACTIONS): ICD-10-CM

## 2021-03-23 DIAGNOSIS — I51.3 THROMBUS OF LEFT ATRIAL APPENDAGE: ICD-10-CM

## 2021-03-23 DIAGNOSIS — I70.0 CALCIFICATION OF AORTA: ICD-10-CM

## 2021-03-24 PROCEDURE — 93010 EKG 12-LEAD: ICD-10-PCS | Mod: ,,, | Performed by: INTERNAL MEDICINE

## 2021-03-24 PROCEDURE — 93010 ELECTROCARDIOGRAM REPORT: CPT | Mod: ,,, | Performed by: INTERNAL MEDICINE

## 2021-03-25 ENCOUNTER — OFFICE VISIT (OUTPATIENT)
Dept: CARDIOLOGY | Facility: CLINIC | Age: 86
End: 2021-03-25
Payer: MEDICARE

## 2021-03-25 ENCOUNTER — HOSPITAL ENCOUNTER (OUTPATIENT)
Dept: CARDIOLOGY | Facility: HOSPITAL | Age: 86
Discharge: HOME | End: 2021-03-25
Attending: INTERNAL MEDICINE
Payer: MEDICARE

## 2021-03-25 ENCOUNTER — TELEPHONE (OUTPATIENT)
Dept: CARDIOLOGY | Facility: CLINIC | Age: 86
End: 2021-03-25

## 2021-03-25 VITALS
SYSTOLIC BLOOD PRESSURE: 148 MMHG | DIASTOLIC BLOOD PRESSURE: 78 MMHG | WEIGHT: 185.19 LBS | HEART RATE: 65 BPM | OXYGEN SATURATION: 98 % | BODY MASS INDEX: 31.79 KG/M2

## 2021-03-25 DIAGNOSIS — I50.30 HEART FAILURE WITH PRESERVED LEFT VENTRICULAR FUNCTION (HFPEF): ICD-10-CM

## 2021-03-25 DIAGNOSIS — I49.3 PVC'S (PREMATURE VENTRICULAR CONTRACTIONS): ICD-10-CM

## 2021-03-25 DIAGNOSIS — I48.0 PAROXYSMAL ATRIAL FIBRILLATION: Primary | ICD-10-CM

## 2021-03-25 DIAGNOSIS — I11.9 HYPERTENSIVE LEFT VENTRICULAR HYPERTROPHY, WITHOUT HEART FAILURE: Chronic | ICD-10-CM

## 2021-03-25 DIAGNOSIS — R60.9 EDEMA, UNSPECIFIED TYPE: ICD-10-CM

## 2021-03-25 DIAGNOSIS — I12.9 CKD STAGE 4 SECONDARY TO HYPERTENSION: ICD-10-CM

## 2021-03-25 DIAGNOSIS — T88.7XXA MEDICATION SIDE EFFECTS: ICD-10-CM

## 2021-03-25 DIAGNOSIS — E78.2 MIXED HYPERLIPIDEMIA: ICD-10-CM

## 2021-03-25 DIAGNOSIS — I51.3 THROMBUS OF LEFT ATRIAL APPENDAGE: ICD-10-CM

## 2021-03-25 DIAGNOSIS — N18.4 CKD STAGE 4 SECONDARY TO HYPERTENSION: ICD-10-CM

## 2021-03-25 DIAGNOSIS — I11.9 HYPERTENSIVE LEFT VENTRICULAR HYPERTROPHY, WITHOUT HEART FAILURE: ICD-10-CM

## 2021-03-25 DIAGNOSIS — I10 ESSENTIAL HYPERTENSION: Chronic | ICD-10-CM

## 2021-03-25 DIAGNOSIS — I48.19 PERSISTENT ATRIAL FIBRILLATION: ICD-10-CM

## 2021-03-25 DIAGNOSIS — I10 ESSENTIAL HYPERTENSION: ICD-10-CM

## 2021-03-25 DIAGNOSIS — R94.31 ABNORMAL ECG: ICD-10-CM

## 2021-03-25 DIAGNOSIS — I70.0 CALCIFICATION OF AORTA: ICD-10-CM

## 2021-03-25 PROCEDURE — 93005 ELECTROCARDIOGRAM TRACING: CPT

## 2021-03-25 PROCEDURE — 1101F PR PT FALLS ASSESS DOC 0-1 FALLS W/OUT INJ PAST YR: ICD-10-PCS | Mod: CPTII,S$GLB,, | Performed by: INTERNAL MEDICINE

## 2021-03-25 PROCEDURE — 99999 PR PBB SHADOW E&M-EST. PATIENT-LVL IV: CPT | Mod: PBBFAC,,, | Performed by: INTERNAL MEDICINE

## 2021-03-25 PROCEDURE — 1159F MED LIST DOCD IN RCRD: CPT | Mod: S$GLB,,, | Performed by: INTERNAL MEDICINE

## 2021-03-25 PROCEDURE — 1126F PR PAIN SEVERITY QUANTIFIED, NO PAIN PRESENT: ICD-10-PCS | Mod: S$GLB,,, | Performed by: INTERNAL MEDICINE

## 2021-03-25 PROCEDURE — 3288F PR FALLS RISK ASSESSMENT DOCUMENTED: ICD-10-PCS | Mod: CPTII,S$GLB,, | Performed by: INTERNAL MEDICINE

## 2021-03-25 PROCEDURE — 3288F FALL RISK ASSESSMENT DOCD: CPT | Mod: CPTII,S$GLB,, | Performed by: INTERNAL MEDICINE

## 2021-03-25 PROCEDURE — 1159F PR MEDICATION LIST DOCUMENTED IN MEDICAL RECORD: ICD-10-PCS | Mod: S$GLB,,, | Performed by: INTERNAL MEDICINE

## 2021-03-25 PROCEDURE — 99999 PR PBB SHADOW E&M-EST. PATIENT-LVL IV: ICD-10-PCS | Mod: PBBFAC,,, | Performed by: INTERNAL MEDICINE

## 2021-03-25 PROCEDURE — 1126F AMNT PAIN NOTED NONE PRSNT: CPT | Mod: S$GLB,,, | Performed by: INTERNAL MEDICINE

## 2021-03-25 PROCEDURE — 99214 OFFICE O/P EST MOD 30 MIN: CPT | Mod: S$GLB,,, | Performed by: INTERNAL MEDICINE

## 2021-03-25 PROCEDURE — 1101F PT FALLS ASSESS-DOCD LE1/YR: CPT | Mod: CPTII,S$GLB,, | Performed by: INTERNAL MEDICINE

## 2021-03-25 PROCEDURE — 99214 PR OFFICE/OUTPT VISIT, EST, LEVL IV, 30-39 MIN: ICD-10-PCS | Mod: S$GLB,,, | Performed by: INTERNAL MEDICINE

## 2021-03-25 RX ORDER — FUROSEMIDE 40 MG/1
40 TABLET ORAL WEEKLY
Qty: 30 TABLET | Refills: 3 | Status: SHIPPED | OUTPATIENT
Start: 2021-03-25 | End: 2021-06-17

## 2021-03-25 RX ORDER — LOSARTAN POTASSIUM 50 MG/1
50 TABLET ORAL 2 TIMES DAILY
Qty: 60 TABLET | Refills: 5 | Status: SHIPPED | OUTPATIENT
Start: 2021-03-25 | End: 2021-07-22 | Stop reason: SDUPTHER

## 2021-03-29 ENCOUNTER — HOSPITAL ENCOUNTER (OUTPATIENT)
Dept: RADIOLOGY | Facility: HOSPITAL | Age: 86
Discharge: HOME | End: 2021-03-29
Attending: NURSE PRACTITIONER
Payer: MEDICARE

## 2021-03-29 DIAGNOSIS — E27.8 ADRENAL NODULE: Primary | ICD-10-CM

## 2021-03-29 DIAGNOSIS — R31.9 HEMATURIA OF UNKNOWN CAUSE: ICD-10-CM

## 2021-03-29 DIAGNOSIS — R39.82 CHRONIC BLADDER PAIN: ICD-10-CM

## 2021-03-29 DIAGNOSIS — R91.1 SOLITARY PULMONARY NODULE: ICD-10-CM

## 2021-03-29 PROCEDURE — 74176 CT ABD & PELVIS W/O CONTRAST: CPT | Mod: TC

## 2021-04-05 ENCOUNTER — TELEPHONE (OUTPATIENT)
Dept: CARDIOLOGY | Facility: CLINIC | Age: 86
End: 2021-04-05

## 2021-04-05 ENCOUNTER — TELEPHONE (OUTPATIENT)
Dept: INTERNAL MEDICINE | Facility: CLINIC | Age: 86
End: 2021-04-05

## 2021-04-05 ENCOUNTER — PATIENT MESSAGE (OUTPATIENT)
Dept: PRIMARY CARE CLINIC | Facility: CLINIC | Age: 86
End: 2021-04-05

## 2021-04-07 DIAGNOSIS — G47.00 INSOMNIA, UNSPECIFIED TYPE: ICD-10-CM

## 2021-04-07 DIAGNOSIS — F41.9 ANXIETY: ICD-10-CM

## 2021-04-07 RX ORDER — ALPRAZOLAM 0.5 MG/1
TABLET ORAL
Qty: 30 TABLET | Refills: 2 | Status: SHIPPED | OUTPATIENT
Start: 2021-04-07 | End: 2021-06-16 | Stop reason: SDUPTHER

## 2021-04-09 ENCOUNTER — HOSPITAL ENCOUNTER (OUTPATIENT)
Dept: CARDIOLOGY | Facility: HOSPITAL | Age: 86
Discharge: HOME | End: 2021-04-09
Attending: INTERNAL MEDICINE
Payer: MEDICARE

## 2021-04-09 VITALS
HEIGHT: 64 IN | WEIGHT: 185 LBS | BODY MASS INDEX: 31.58 KG/M2 | WEIGHT: 185 LBS | BODY MASS INDEX: 31.58 KG/M2 | BODY MASS INDEX: 31.58 KG/M2 | HEIGHT: 64 IN | WEIGHT: 185 LBS | HEIGHT: 64 IN

## 2021-04-09 DIAGNOSIS — R60.9 EDEMA, UNSPECIFIED TYPE: ICD-10-CM

## 2021-04-09 PROCEDURE — 93970 EXTREMITY STUDY: CPT | Mod: 50

## 2021-04-09 PROCEDURE — 93970 CV US DOPPLER VENOUS LEGS BILATERAL (CUPID ONLY): ICD-10-PCS | Mod: 26,,, | Performed by: INTERNAL MEDICINE

## 2021-04-09 PROCEDURE — 93925 LOWER EXTREMITY STUDY: CPT

## 2021-04-09 PROCEDURE — 93922 UPR/L XTREMITY ART 2 LEVELS: CPT

## 2021-04-09 PROCEDURE — 93922 ANKLE BRACHIAL INDICES (ABI): ICD-10-PCS | Mod: 26,,, | Performed by: INTERNAL MEDICINE

## 2021-04-09 PROCEDURE — 93922 UPR/L XTREMITY ART 2 LEVELS: CPT | Mod: 26,,, | Performed by: INTERNAL MEDICINE

## 2021-04-09 PROCEDURE — 93925 CV US DOPPLER ARTERIAL LEGS BILATERAL (CUPID ONLY): ICD-10-PCS | Mod: 26,,, | Performed by: INTERNAL MEDICINE

## 2021-04-09 PROCEDURE — 93925 LOWER EXTREMITY STUDY: CPT | Mod: 26,,, | Performed by: INTERNAL MEDICINE

## 2021-04-09 PROCEDURE — 93970 EXTREMITY STUDY: CPT | Mod: 26,,, | Performed by: INTERNAL MEDICINE

## 2021-04-12 ENCOUNTER — HOSPITAL ENCOUNTER (OUTPATIENT)
Dept: RADIOLOGY | Facility: HOSPITAL | Age: 86
Discharge: HOME | End: 2021-04-12
Attending: NURSE PRACTITIONER
Payer: MEDICARE

## 2021-04-12 ENCOUNTER — TELEPHONE (OUTPATIENT)
Dept: CARDIOLOGY | Facility: CLINIC | Age: 86
End: 2021-04-12

## 2021-04-12 ENCOUNTER — TELEPHONE (OUTPATIENT)
Dept: PRIMARY CARE CLINIC | Facility: CLINIC | Age: 86
End: 2021-04-12

## 2021-04-12 DIAGNOSIS — R91.1 SOLITARY PULMONARY NODULE: ICD-10-CM

## 2021-04-12 DIAGNOSIS — R91.8 PULMONARY NODULES: Primary | ICD-10-CM

## 2021-04-12 LAB
LEFT ABI: 0.66
LEFT ANT TIBIAL SYS PSV: 25 CM/S
LEFT ARM BP: 176 MMHG
LEFT CFA PSV: 164 CM/S
LEFT DORSALIS PEDIS: 112 MMHG
LEFT PERONEAL SYS PSV: 45 CM/S
LEFT POPLITEAL PSV: 77 CM/S
LEFT POST TIBIAL SYS PSV: 35 CM/S
LEFT POSTERIOR TIBIAL: 116 MMHG
LEFT PROFUNDA SYS PSV: 143 CM/S
LEFT SUPER FEMORAL DIST SYS PSV: 264 CM/S
LEFT SUPER FEMORAL MID SYS PSV: 492 CM/S
LEFT SUPER FEMORAL OSTIAL SYS PSV: 77 CM/S
LEFT SUPER FEMORAL PROX SYS PSV: 75 CM/S
LEFT TBI: 0.56
LEFT TIB/PER TRUNK SYS PSV: 124 CM/S
LEFT TOE PRESSURE: 99 MMHG
OHS CV LEFT LOWER EXTREMITY ABI (NO CALC): 0.66
OHS CV RIGHT ABI LOWER EXTREMITY (NO CALC): 0.71
RIGHT ABI: 0.71
RIGHT ANT TIBIAL SYS PSV: 38 CM/S
RIGHT ARM BP: 148 MMHG
RIGHT CFA PSV: 151 CM/S
RIGHT DORSALIS PEDIS: 124 MMHG
RIGHT PERONEAL SYS PSV: 78 CM/S
RIGHT POPLITEAL PSV: 83 CM/S
RIGHT POST TIBIAL SYS PSV: 38 CM/S
RIGHT POSTERIOR TIBIAL: 125 MMHG
RIGHT PROFUNDA SYS PSV: 76 CM/S
RIGHT SUPER FEMORAL DIST SYS PSV: 338 CM/S
RIGHT SUPER FEMORAL MID SYS PSV: 58 CM/S
RIGHT SUPER FEMORAL OSTIAL SYS PSV: 117 CM/S
RIGHT SUPER FEMORAL PROX SYS PSV: 82 CM/S
RIGHT TBI: 0.66
RIGHT TIB/PER TRUNK SYS PSV: 65 CM/S
RIGHT TOE PRESSURE: 117 MMHG

## 2021-04-12 PROCEDURE — 71250 CT THORAX DX C-: CPT | Mod: TC

## 2021-04-14 ENCOUNTER — OFFICE VISIT (OUTPATIENT)
Dept: ORTHOPEDICS | Facility: CLINIC | Age: 86
End: 2021-04-14
Payer: MEDICARE

## 2021-04-14 VITALS
HEIGHT: 64 IN | WEIGHT: 185 LBS | BODY MASS INDEX: 31.58 KG/M2 | HEART RATE: 46 BPM | SYSTOLIC BLOOD PRESSURE: 164 MMHG | DIASTOLIC BLOOD PRESSURE: 64 MMHG

## 2021-04-14 DIAGNOSIS — M72.0 DUPUYTREN'S CONTRACTURE OF LEFT HAND: Primary | ICD-10-CM

## 2021-04-14 PROCEDURE — 1125F PR PAIN SEVERITY QUANTIFIED, PAIN PRESENT: ICD-10-PCS | Mod: S$GLB,,, | Performed by: ORTHOPAEDIC SURGERY

## 2021-04-14 PROCEDURE — 1159F PR MEDICATION LIST DOCUMENTED IN MEDICAL RECORD: ICD-10-PCS | Mod: S$GLB,,, | Performed by: ORTHOPAEDIC SURGERY

## 2021-04-14 PROCEDURE — 3288F PR FALLS RISK ASSESSMENT DOCUMENTED: ICD-10-PCS | Mod: CPTII,S$GLB,, | Performed by: ORTHOPAEDIC SURGERY

## 2021-04-14 PROCEDURE — 99213 PR OFFICE/OUTPT VISIT, EST, LEVL III, 20-29 MIN: ICD-10-PCS | Mod: S$GLB,,, | Performed by: ORTHOPAEDIC SURGERY

## 2021-04-14 PROCEDURE — 99213 OFFICE O/P EST LOW 20 MIN: CPT | Mod: S$GLB,,, | Performed by: ORTHOPAEDIC SURGERY

## 2021-04-14 PROCEDURE — 1101F PR PT FALLS ASSESS DOC 0-1 FALLS W/OUT INJ PAST YR: ICD-10-PCS | Mod: CPTII,S$GLB,, | Performed by: ORTHOPAEDIC SURGERY

## 2021-04-14 PROCEDURE — 99999 PR PBB SHADOW E&M-EST. PATIENT-LVL IV: CPT | Mod: PBBFAC,,, | Performed by: ORTHOPAEDIC SURGERY

## 2021-04-14 PROCEDURE — 1125F AMNT PAIN NOTED PAIN PRSNT: CPT | Mod: S$GLB,,, | Performed by: ORTHOPAEDIC SURGERY

## 2021-04-14 PROCEDURE — 99999 PR PBB SHADOW E&M-EST. PATIENT-LVL IV: ICD-10-PCS | Mod: PBBFAC,,, | Performed by: ORTHOPAEDIC SURGERY

## 2021-04-14 PROCEDURE — 1159F MED LIST DOCD IN RCRD: CPT | Mod: S$GLB,,, | Performed by: ORTHOPAEDIC SURGERY

## 2021-04-14 PROCEDURE — 1101F PT FALLS ASSESS-DOCD LE1/YR: CPT | Mod: CPTII,S$GLB,, | Performed by: ORTHOPAEDIC SURGERY

## 2021-04-14 PROCEDURE — 3288F FALL RISK ASSESSMENT DOCD: CPT | Mod: CPTII,S$GLB,, | Performed by: ORTHOPAEDIC SURGERY

## 2021-04-26 ENCOUNTER — OFFICE VISIT (OUTPATIENT)
Dept: CARDIOLOGY | Facility: CLINIC | Age: 86
End: 2021-04-26
Payer: MEDICARE

## 2021-04-26 VITALS
SYSTOLIC BLOOD PRESSURE: 132 MMHG | HEART RATE: 68 BPM | BODY MASS INDEX: 32.54 KG/M2 | OXYGEN SATURATION: 93 % | WEIGHT: 189.63 LBS | DIASTOLIC BLOOD PRESSURE: 74 MMHG

## 2021-04-26 DIAGNOSIS — I48.0 PAROXYSMAL ATRIAL FIBRILLATION: ICD-10-CM

## 2021-04-26 DIAGNOSIS — N18.32 STAGE 3B CHRONIC KIDNEY DISEASE: ICD-10-CM

## 2021-04-26 DIAGNOSIS — R73.03 PRE-DIABETES: ICD-10-CM

## 2021-04-26 DIAGNOSIS — I70.0 CALCIFICATION OF AORTA: Chronic | ICD-10-CM

## 2021-04-26 DIAGNOSIS — I49.3 PVC'S (PREMATURE VENTRICULAR CONTRACTIONS): ICD-10-CM

## 2021-04-26 DIAGNOSIS — F41.9 ANXIETY: ICD-10-CM

## 2021-04-26 DIAGNOSIS — I51.3 THROMBUS OF LEFT ATRIAL APPENDAGE: ICD-10-CM

## 2021-04-26 DIAGNOSIS — R09.89 UNEQUAL BLOOD PRESSURES IN PAIRED EXTREMITIES: ICD-10-CM

## 2021-04-26 DIAGNOSIS — R94.31 ABNORMAL ECG: ICD-10-CM

## 2021-04-26 DIAGNOSIS — G62.9 NEUROPATHY: ICD-10-CM

## 2021-04-26 DIAGNOSIS — I11.9 HYPERTENSIVE LEFT VENTRICULAR HYPERTROPHY, WITHOUT HEART FAILURE: Chronic | ICD-10-CM

## 2021-04-26 DIAGNOSIS — E78.2 MIXED HYPERLIPIDEMIA: ICD-10-CM

## 2021-04-26 DIAGNOSIS — I10 ESSENTIAL HYPERTENSION: Chronic | ICD-10-CM

## 2021-04-26 DIAGNOSIS — I73.9 PVD (PERIPHERAL VASCULAR DISEASE): Primary | ICD-10-CM

## 2021-04-26 PROCEDURE — 1159F MED LIST DOCD IN RCRD: CPT | Mod: S$GLB,,, | Performed by: INTERNAL MEDICINE

## 2021-04-26 PROCEDURE — 99204 OFFICE O/P NEW MOD 45 MIN: CPT | Mod: S$GLB,,, | Performed by: INTERNAL MEDICINE

## 2021-04-26 PROCEDURE — 1159F PR MEDICATION LIST DOCUMENTED IN MEDICAL RECORD: ICD-10-PCS | Mod: S$GLB,,, | Performed by: INTERNAL MEDICINE

## 2021-04-26 PROCEDURE — 99204 PR OFFICE/OUTPT VISIT, NEW, LEVL IV, 45-59 MIN: ICD-10-PCS | Mod: S$GLB,,, | Performed by: INTERNAL MEDICINE

## 2021-04-26 PROCEDURE — 99499 UNLISTED E&M SERVICE: CPT | Mod: S$GLB,,, | Performed by: INTERNAL MEDICINE

## 2021-04-26 PROCEDURE — 99999 PR PBB SHADOW E&M-EST. PATIENT-LVL III: ICD-10-PCS | Mod: PBBFAC,,, | Performed by: INTERNAL MEDICINE

## 2021-04-26 PROCEDURE — 99999 PR PBB SHADOW E&M-EST. PATIENT-LVL III: CPT | Mod: PBBFAC,,, | Performed by: INTERNAL MEDICINE

## 2021-04-26 PROCEDURE — 99499 RISK ADDL DX/OHS AUDIT: ICD-10-PCS | Mod: S$GLB,,, | Performed by: INTERNAL MEDICINE

## 2021-04-27 ENCOUNTER — TELEPHONE (OUTPATIENT)
Dept: PRIMARY CARE CLINIC | Facility: CLINIC | Age: 86
End: 2021-04-27

## 2021-04-29 PROBLEM — E88.810 METABOLIC SYNDROME: Chronic | Status: RESOLVED | Noted: 2018-03-16 | Resolved: 2021-04-29

## 2021-04-29 PROBLEM — E11.9 TYPE 2 DIABETES MELLITUS WITHOUT COMPLICATION, WITHOUT LONG-TERM CURRENT USE OF INSULIN: Status: RESOLVED | Noted: 2021-02-24 | Resolved: 2021-04-29

## 2021-05-03 ENCOUNTER — OUTPATIENT CASE MANAGEMENT (OUTPATIENT)
Dept: ADMINISTRATIVE | Facility: OTHER | Age: 86
End: 2021-05-03

## 2021-05-03 ENCOUNTER — OFFICE VISIT (OUTPATIENT)
Dept: PRIMARY CARE CLINIC | Facility: CLINIC | Age: 86
End: 2021-05-03
Payer: MEDICARE

## 2021-05-03 ENCOUNTER — TELEPHONE (OUTPATIENT)
Dept: PULMONOLOGY | Facility: CLINIC | Age: 86
End: 2021-05-03

## 2021-05-03 ENCOUNTER — LAB VISIT (OUTPATIENT)
Dept: LAB | Facility: HOSPITAL | Age: 86
End: 2021-05-03
Attending: NURSE PRACTITIONER
Payer: MEDICARE

## 2021-05-03 VITALS
TEMPERATURE: 99 F | BODY MASS INDEX: 32.29 KG/M2 | OXYGEN SATURATION: 92 % | DIASTOLIC BLOOD PRESSURE: 72 MMHG | HEIGHT: 64 IN | WEIGHT: 189.13 LBS | SYSTOLIC BLOOD PRESSURE: 122 MMHG | HEART RATE: 70 BPM

## 2021-05-03 DIAGNOSIS — Z78.9 MEDICATION INTOLERANCE: ICD-10-CM

## 2021-05-03 DIAGNOSIS — I12.9 CKD STAGE 4 SECONDARY TO HYPERTENSION: ICD-10-CM

## 2021-05-03 DIAGNOSIS — I10 ESSENTIAL HYPERTENSION: Chronic | ICD-10-CM

## 2021-05-03 DIAGNOSIS — N18.4 CKD STAGE 4 SECONDARY TO HYPERTENSION: Primary | ICD-10-CM

## 2021-05-03 DIAGNOSIS — N18.4 CKD STAGE 4 SECONDARY TO HYPERTENSION: ICD-10-CM

## 2021-05-03 DIAGNOSIS — R91.1 SOLITARY PULMONARY NODULE: ICD-10-CM

## 2021-05-03 DIAGNOSIS — I13.2 HYPERTENSIVE HEART AND RENAL DISEASE WITH BOTH (CONGESTIVE) HEART FAILURE AND RENAL FAILURE: ICD-10-CM

## 2021-05-03 DIAGNOSIS — I48.11 LONGSTANDING PERSISTENT ATRIAL FIBRILLATION: ICD-10-CM

## 2021-05-03 DIAGNOSIS — I12.9 CKD STAGE 4 SECONDARY TO HYPERTENSION: Primary | ICD-10-CM

## 2021-05-03 DIAGNOSIS — M48.061 SPINAL STENOSIS AT L4-L5 LEVEL: ICD-10-CM

## 2021-05-03 DIAGNOSIS — R91.8 PULMONARY NODULES/LESIONS, MULTIPLE: ICD-10-CM

## 2021-05-03 DIAGNOSIS — N19 HYPERTENSIVE HEART AND RENAL DISEASE WITH BOTH (CONGESTIVE) HEART FAILURE AND RENAL FAILURE: ICD-10-CM

## 2021-05-03 LAB
BASOPHILS # BLD AUTO: 0.08 K/UL (ref 0–0.2)
BASOPHILS NFR BLD: 1 % (ref 0–1.9)
DIFFERENTIAL METHOD: ABNORMAL
EOSINOPHIL # BLD AUTO: 0.3 K/UL (ref 0–0.5)
EOSINOPHIL NFR BLD: 3.9 % (ref 0–8)
ERYTHROCYTE [DISTWIDTH] IN BLOOD BY AUTOMATED COUNT: 12.9 % (ref 11.5–14.5)
ERYTHROCYTE [SEDIMENTATION RATE] IN BLOOD BY WESTERGREN METHOD: 26 MM/HR (ref 0–20)
HCT VFR BLD AUTO: 39 % (ref 37–48.5)
HGB BLD-MCNC: 12.6 G/DL (ref 12–16)
IMM GRANULOCYTES # BLD AUTO: 0.03 K/UL (ref 0–0.04)
IMM GRANULOCYTES NFR BLD AUTO: 0.4 % (ref 0–0.5)
LYMPHOCYTES # BLD AUTO: 1.6 K/UL (ref 1–4.8)
LYMPHOCYTES NFR BLD: 19.7 % (ref 18–48)
MCH RBC QN AUTO: 33.2 PG (ref 27–31)
MCHC RBC AUTO-ENTMCNC: 32.3 G/DL (ref 32–36)
MCV RBC AUTO: 103 FL (ref 82–98)
MONOCYTES # BLD AUTO: 0.8 K/UL (ref 0.3–1)
MONOCYTES NFR BLD: 10.1 % (ref 4–15)
NEUTROPHILS # BLD AUTO: 5.1 K/UL (ref 1.8–7.7)
NEUTROPHILS NFR BLD: 64.9 % (ref 38–73)
NRBC BLD-RTO: 0 /100 WBC
PLATELET # BLD AUTO: 229 K/UL (ref 150–450)
PMV BLD AUTO: 10.2 FL (ref 9.2–12.9)
RBC # BLD AUTO: 3.8 M/UL (ref 4–5.4)
WBC # BLD AUTO: 7.9 K/UL (ref 3.9–12.7)

## 2021-05-03 PROCEDURE — 99214 OFFICE O/P EST MOD 30 MIN: CPT | Mod: S$GLB,,, | Performed by: NURSE PRACTITIONER

## 2021-05-03 PROCEDURE — 1125F PR PAIN SEVERITY QUANTIFIED, PAIN PRESENT: ICD-10-PCS | Mod: S$GLB,,, | Performed by: NURSE PRACTITIONER

## 2021-05-03 PROCEDURE — 1159F PR MEDICATION LIST DOCUMENTED IN MEDICAL RECORD: ICD-10-PCS | Mod: S$GLB,,, | Performed by: NURSE PRACTITIONER

## 2021-05-03 PROCEDURE — 99499 RISK ADDL DX/OHS AUDIT: ICD-10-PCS | Mod: S$GLB,,, | Performed by: NURSE PRACTITIONER

## 2021-05-03 PROCEDURE — 1159F MED LIST DOCD IN RCRD: CPT | Mod: S$GLB,,, | Performed by: NURSE PRACTITIONER

## 2021-05-03 PROCEDURE — 86140 C-REACTIVE PROTEIN: CPT | Performed by: NURSE PRACTITIONER

## 2021-05-03 PROCEDURE — 99214 PR OFFICE/OUTPT VISIT, EST, LEVL IV, 30-39 MIN: ICD-10-PCS | Mod: S$GLB,,, | Performed by: NURSE PRACTITIONER

## 2021-05-03 PROCEDURE — 36415 COLL VENOUS BLD VENIPUNCTURE: CPT | Performed by: NURSE PRACTITIONER

## 2021-05-03 PROCEDURE — 1101F PT FALLS ASSESS-DOCD LE1/YR: CPT | Mod: CPTII,S$GLB,, | Performed by: NURSE PRACTITIONER

## 2021-05-03 PROCEDURE — 3288F FALL RISK ASSESSMENT DOCD: CPT | Mod: CPTII,S$GLB,, | Performed by: NURSE PRACTITIONER

## 2021-05-03 PROCEDURE — 85025 COMPLETE CBC W/AUTO DIFF WBC: CPT | Performed by: NURSE PRACTITIONER

## 2021-05-03 PROCEDURE — 1101F PR PT FALLS ASSESS DOC 0-1 FALLS W/OUT INJ PAST YR: ICD-10-PCS | Mod: CPTII,S$GLB,, | Performed by: NURSE PRACTITIONER

## 2021-05-03 PROCEDURE — 85651 RBC SED RATE NONAUTOMATED: CPT | Performed by: NURSE PRACTITIONER

## 2021-05-03 PROCEDURE — 99499 UNLISTED E&M SERVICE: CPT | Mod: S$GLB,,, | Performed by: NURSE PRACTITIONER

## 2021-05-03 PROCEDURE — 80053 COMPREHEN METABOLIC PANEL: CPT | Performed by: NURSE PRACTITIONER

## 2021-05-03 PROCEDURE — 99999 PR PBB SHADOW E&M-EST. PATIENT-LVL V: ICD-10-PCS | Mod: PBBFAC,,, | Performed by: NURSE PRACTITIONER

## 2021-05-03 PROCEDURE — 1125F AMNT PAIN NOTED PAIN PRSNT: CPT | Mod: S$GLB,,, | Performed by: NURSE PRACTITIONER

## 2021-05-03 PROCEDURE — 3288F PR FALLS RISK ASSESSMENT DOCUMENTED: ICD-10-PCS | Mod: CPTII,S$GLB,, | Performed by: NURSE PRACTITIONER

## 2021-05-03 PROCEDURE — 99999 PR PBB SHADOW E&M-EST. PATIENT-LVL V: CPT | Mod: PBBFAC,,, | Performed by: NURSE PRACTITIONER

## 2021-05-04 LAB
ALBUMIN SERPL BCP-MCNC: 3.7 G/DL (ref 3.5–5.2)
ALP SERPL-CCNC: 62 U/L (ref 55–135)
ALT SERPL W/O P-5'-P-CCNC: 15 U/L (ref 10–44)
ANION GAP SERPL CALC-SCNC: 22 MMOL/L (ref 8–16)
AST SERPL-CCNC: 21 U/L (ref 10–40)
BILIRUB SERPL-MCNC: 0.5 MG/DL (ref 0.1–1)
BUN SERPL-MCNC: 29 MG/DL (ref 8–23)
CALCIUM SERPL-MCNC: 9.6 MG/DL (ref 8.7–10.5)
CHLORIDE SERPL-SCNC: 104 MMOL/L (ref 95–110)
CO2 SERPL-SCNC: 18 MMOL/L (ref 23–29)
CREAT SERPL-MCNC: 1.7 MG/DL (ref 0.5–1.4)
CRP SERPL-MCNC: 1.4 MG/L (ref 0–8.2)
EST. GFR  (AFRICAN AMERICAN): 30.6 ML/MIN/1.73 M^2
EST. GFR  (NON AFRICAN AMERICAN): 26.5 ML/MIN/1.73 M^2
GLUCOSE SERPL-MCNC: 85 MG/DL (ref 70–110)
POTASSIUM SERPL-SCNC: 4.2 MMOL/L (ref 3.5–5.1)
PROT SERPL-MCNC: 7.5 G/DL (ref 6–8.4)
SODIUM SERPL-SCNC: 144 MMOL/L (ref 136–145)

## 2021-05-05 ENCOUNTER — TELEPHONE (OUTPATIENT)
Dept: PRIMARY CARE CLINIC | Facility: CLINIC | Age: 86
End: 2021-05-05

## 2021-05-05 DIAGNOSIS — R93.89 ABNORMAL CT SCAN: Primary | ICD-10-CM

## 2021-05-06 ENCOUNTER — OFFICE VISIT (OUTPATIENT)
Dept: CARDIOLOGY | Facility: CLINIC | Age: 86
End: 2021-05-06
Payer: MEDICARE

## 2021-05-06 VITALS
HEIGHT: 64 IN | DIASTOLIC BLOOD PRESSURE: 70 MMHG | SYSTOLIC BLOOD PRESSURE: 156 MMHG | RESPIRATION RATE: 16 BRPM | OXYGEN SATURATION: 98 % | HEART RATE: 66 BPM | WEIGHT: 190.69 LBS | BODY MASS INDEX: 32.56 KG/M2

## 2021-05-06 DIAGNOSIS — N19 HYPERTENSIVE HEART AND RENAL DISEASE WITH BOTH (CONGESTIVE) HEART FAILURE AND RENAL FAILURE: ICD-10-CM

## 2021-05-06 DIAGNOSIS — I13.2 HYPERTENSIVE HEART AND RENAL DISEASE WITH BOTH (CONGESTIVE) HEART FAILURE AND RENAL FAILURE: ICD-10-CM

## 2021-05-06 DIAGNOSIS — I73.9 PVD (PERIPHERAL VASCULAR DISEASE): ICD-10-CM

## 2021-05-06 DIAGNOSIS — I10 ESSENTIAL HYPERTENSION: Chronic | ICD-10-CM

## 2021-05-06 DIAGNOSIS — I48.11 LONGSTANDING PERSISTENT ATRIAL FIBRILLATION: ICD-10-CM

## 2021-05-06 DIAGNOSIS — I70.0 CALCIFICATION OF AORTA: Chronic | ICD-10-CM

## 2021-05-06 DIAGNOSIS — I11.9 HYPERTENSIVE LEFT VENTRICULAR HYPERTROPHY, WITHOUT HEART FAILURE: Primary | Chronic | ICD-10-CM

## 2021-05-06 DIAGNOSIS — I49.3 PVC'S (PREMATURE VENTRICULAR CONTRACTIONS): ICD-10-CM

## 2021-05-06 DIAGNOSIS — I51.3 THROMBUS OF LEFT ATRIAL APPENDAGE: ICD-10-CM

## 2021-05-06 DIAGNOSIS — E78.2 MIXED HYPERLIPIDEMIA: ICD-10-CM

## 2021-05-06 PROCEDURE — 99999 PR PBB SHADOW E&M-EST. PATIENT-LVL IV: CPT | Mod: PBBFAC,,, | Performed by: INTERNAL MEDICINE

## 2021-05-06 PROCEDURE — 1159F MED LIST DOCD IN RCRD: CPT | Mod: S$GLB,,, | Performed by: INTERNAL MEDICINE

## 2021-05-06 PROCEDURE — 99999 PR PBB SHADOW E&M-EST. PATIENT-LVL IV: ICD-10-PCS | Mod: PBBFAC,,, | Performed by: INTERNAL MEDICINE

## 2021-05-06 PROCEDURE — 99214 PR OFFICE/OUTPT VISIT, EST, LEVL IV, 30-39 MIN: ICD-10-PCS | Mod: S$GLB,,, | Performed by: INTERNAL MEDICINE

## 2021-05-06 PROCEDURE — 99214 OFFICE O/P EST MOD 30 MIN: CPT | Mod: S$GLB,,, | Performed by: INTERNAL MEDICINE

## 2021-05-06 PROCEDURE — 1159F PR MEDICATION LIST DOCUMENTED IN MEDICAL RECORD: ICD-10-PCS | Mod: S$GLB,,, | Performed by: INTERNAL MEDICINE

## 2021-05-06 RX ORDER — ISOSORBIDE MONONITRATE 30 MG/1
30 TABLET, EXTENDED RELEASE ORAL 2 TIMES DAILY
Qty: 90 TABLET | Refills: 1 | Status: SHIPPED | OUTPATIENT
Start: 2021-05-06 | End: 2021-07-15

## 2021-05-10 ENCOUNTER — TELEPHONE (OUTPATIENT)
Dept: PRIMARY CARE CLINIC | Facility: CLINIC | Age: 86
End: 2021-05-10

## 2021-05-13 ENCOUNTER — OUTPATIENT CASE MANAGEMENT (OUTPATIENT)
Dept: ADMINISTRATIVE | Facility: OTHER | Age: 86
End: 2021-05-13

## 2021-05-17 ENCOUNTER — TELEPHONE (OUTPATIENT)
Dept: PRIMARY CARE CLINIC | Facility: CLINIC | Age: 86
End: 2021-05-17

## 2021-05-20 ENCOUNTER — PATIENT MESSAGE (OUTPATIENT)
Dept: PRIMARY CARE CLINIC | Facility: CLINIC | Age: 86
End: 2021-05-20

## 2021-05-21 ENCOUNTER — PATIENT MESSAGE (OUTPATIENT)
Dept: HEPATOLOGY | Facility: CLINIC | Age: 86
End: 2021-05-21

## 2021-05-31 ENCOUNTER — HOSPITAL ENCOUNTER (OUTPATIENT)
Dept: RADIOLOGY | Facility: HOSPITAL | Age: 86
Discharge: HOME | End: 2021-05-31
Attending: NURSE PRACTITIONER
Payer: MEDICARE

## 2021-05-31 DIAGNOSIS — R91.1 SOLITARY PULMONARY NODULE: ICD-10-CM

## 2021-05-31 PROCEDURE — 78815 PET IMAGE W/CT SKULL-THIGH: CPT | Mod: TC,PI

## 2021-05-31 PROCEDURE — 78815 PET IMAGE W/CT SKULL-THIGH: CPT | Mod: 26,PI,, | Performed by: RADIOLOGY

## 2021-05-31 PROCEDURE — 78815 NM PET CT ROUTINE: ICD-10-PCS | Mod: 26,PI,, | Performed by: RADIOLOGY

## 2021-06-02 ENCOUNTER — LAB VISIT (OUTPATIENT)
Dept: LAB | Facility: HOSPITAL | Age: 86
End: 2021-06-02
Payer: MEDICARE

## 2021-06-02 ENCOUNTER — OFFICE VISIT (OUTPATIENT)
Dept: PRIMARY CARE CLINIC | Facility: CLINIC | Age: 86
End: 2021-06-02
Payer: MEDICARE

## 2021-06-02 VITALS
HEIGHT: 64 IN | HEART RATE: 68 BPM | WEIGHT: 190.25 LBS | DIASTOLIC BLOOD PRESSURE: 78 MMHG | BODY MASS INDEX: 32.48 KG/M2 | SYSTOLIC BLOOD PRESSURE: 128 MMHG | OXYGEN SATURATION: 95 %

## 2021-06-02 DIAGNOSIS — R73.03 PRE-DIABETES: ICD-10-CM

## 2021-06-02 DIAGNOSIS — R30.0 DYSURIA: ICD-10-CM

## 2021-06-02 DIAGNOSIS — R30.0 DYSURIA: Primary | ICD-10-CM

## 2021-06-02 DIAGNOSIS — J84.10 PULMONARY GRANULOMA: ICD-10-CM

## 2021-06-02 LAB
BACTERIA #/AREA URNS HPF: ABNORMAL /HPF
BILIRUB UR QL STRIP: NEGATIVE
CLARITY UR: CLEAR
COLOR UR: YELLOW
GLUCOSE UR QL STRIP: NEGATIVE
HGB UR QL STRIP: ABNORMAL
KETONES UR QL STRIP: NEGATIVE
LEUKOCYTE ESTERASE UR QL STRIP: ABNORMAL
MICROSCOPIC COMMENT: ABNORMAL
NITRITE UR QL STRIP: NEGATIVE
NON-SQ EPI CELLS #/AREA URNS HPF: <1 /HPF
PH UR STRIP: 5.5 [PH] (ref 5–8)
PROT UR QL STRIP: NEGATIVE
RBC #/AREA URNS HPF: 5 /HPF (ref 0–4)
SP GR UR STRIP: 1.02 (ref 1–1.03)
SQUAMOUS #/AREA URNS HPF: 1 /HPF
URN SPEC COLLECT METH UR: ABNORMAL
WBC #/AREA URNS HPF: 5 /HPF (ref 0–5)

## 2021-06-02 PROCEDURE — 99999 PR PBB SHADOW E&M-EST. PATIENT-LVL V: CPT | Mod: PBBFAC,,, | Performed by: NURSE PRACTITIONER

## 2021-06-02 PROCEDURE — 1159F MED LIST DOCD IN RCRD: CPT | Mod: S$GLB,,, | Performed by: NURSE PRACTITIONER

## 2021-06-02 PROCEDURE — 81000 URINALYSIS NONAUTO W/SCOPE: CPT | Performed by: NURSE PRACTITIONER

## 2021-06-02 PROCEDURE — 1101F PR PT FALLS ASSESS DOC 0-1 FALLS W/OUT INJ PAST YR: ICD-10-PCS | Mod: CPTII,S$GLB,, | Performed by: NURSE PRACTITIONER

## 2021-06-02 PROCEDURE — 1101F PT FALLS ASSESS-DOCD LE1/YR: CPT | Mod: CPTII,S$GLB,, | Performed by: NURSE PRACTITIONER

## 2021-06-02 PROCEDURE — 3288F FALL RISK ASSESSMENT DOCD: CPT | Mod: CPTII,S$GLB,, | Performed by: NURSE PRACTITIONER

## 2021-06-02 PROCEDURE — 1125F PR PAIN SEVERITY QUANTIFIED, PAIN PRESENT: ICD-10-PCS | Mod: S$GLB,,, | Performed by: NURSE PRACTITIONER

## 2021-06-02 PROCEDURE — 99215 OFFICE O/P EST HI 40 MIN: CPT | Mod: S$GLB,,, | Performed by: NURSE PRACTITIONER

## 2021-06-02 PROCEDURE — 99999 PR PBB SHADOW E&M-EST. PATIENT-LVL V: ICD-10-PCS | Mod: PBBFAC,,, | Performed by: NURSE PRACTITIONER

## 2021-06-02 PROCEDURE — 3288F PR FALLS RISK ASSESSMENT DOCUMENTED: ICD-10-PCS | Mod: CPTII,S$GLB,, | Performed by: NURSE PRACTITIONER

## 2021-06-02 PROCEDURE — 99215 PR OFFICE/OUTPT VISIT, EST, LEVL V, 40-54 MIN: ICD-10-PCS | Mod: S$GLB,,, | Performed by: NURSE PRACTITIONER

## 2021-06-02 PROCEDURE — 1159F PR MEDICATION LIST DOCUMENTED IN MEDICAL RECORD: ICD-10-PCS | Mod: S$GLB,,, | Performed by: NURSE PRACTITIONER

## 2021-06-02 PROCEDURE — 1125F AMNT PAIN NOTED PAIN PRSNT: CPT | Mod: S$GLB,,, | Performed by: NURSE PRACTITIONER

## 2021-06-02 RX ORDER — FUROSEMIDE 20 MG/1
TABLET ORAL
COMMUNITY
Start: 2021-05-24 | End: 2021-06-17 | Stop reason: SDUPTHER

## 2021-06-03 ENCOUNTER — OUTPATIENT CASE MANAGEMENT (OUTPATIENT)
Dept: ADMINISTRATIVE | Facility: OTHER | Age: 86
End: 2021-06-03

## 2021-06-09 ENCOUNTER — PATIENT MESSAGE (OUTPATIENT)
Dept: PRIMARY CARE CLINIC | Facility: CLINIC | Age: 86
End: 2021-06-09

## 2021-06-15 ENCOUNTER — OUTPATIENT CASE MANAGEMENT (OUTPATIENT)
Dept: ADMINISTRATIVE | Facility: OTHER | Age: 86
End: 2021-06-15

## 2021-06-16 ENCOUNTER — OFFICE VISIT (OUTPATIENT)
Dept: PRIMARY CARE CLINIC | Facility: CLINIC | Age: 86
End: 2021-06-16
Payer: MEDICARE

## 2021-06-16 VITALS
WEIGHT: 190.25 LBS | OXYGEN SATURATION: 94 % | HEIGHT: 64 IN | HEART RATE: 67 BPM | DIASTOLIC BLOOD PRESSURE: 80 MMHG | BODY MASS INDEX: 32.48 KG/M2 | SYSTOLIC BLOOD PRESSURE: 130 MMHG

## 2021-06-16 DIAGNOSIS — M47.816 LUMBAR SPONDYLOSIS: ICD-10-CM

## 2021-06-16 DIAGNOSIS — F41.9 ANXIETY: ICD-10-CM

## 2021-06-16 DIAGNOSIS — I73.9 PAD (PERIPHERAL ARTERY DISEASE): ICD-10-CM

## 2021-06-16 DIAGNOSIS — I13.2 HYPERTENSIVE HEART AND RENAL DISEASE WITH BOTH (CONGESTIVE) HEART FAILURE AND RENAL FAILURE: ICD-10-CM

## 2021-06-16 DIAGNOSIS — N18.4 CKD STAGE 4 SECONDARY TO HYPERTENSION: ICD-10-CM

## 2021-06-16 DIAGNOSIS — M48.061 SPINAL STENOSIS AT L4-L5 LEVEL: ICD-10-CM

## 2021-06-16 DIAGNOSIS — I48.11 LONGSTANDING PERSISTENT ATRIAL FIBRILLATION: ICD-10-CM

## 2021-06-16 DIAGNOSIS — G47.00 INSOMNIA, UNSPECIFIED TYPE: ICD-10-CM

## 2021-06-16 DIAGNOSIS — R73.03 PRE-DIABETES: ICD-10-CM

## 2021-06-16 DIAGNOSIS — J84.10 PULMONARY GRANULOMA: ICD-10-CM

## 2021-06-16 DIAGNOSIS — I12.9 CKD STAGE 4 SECONDARY TO HYPERTENSION: ICD-10-CM

## 2021-06-16 DIAGNOSIS — N19 HYPERTENSIVE HEART AND RENAL DISEASE WITH BOTH (CONGESTIVE) HEART FAILURE AND RENAL FAILURE: ICD-10-CM

## 2021-06-16 PROCEDURE — 1159F PR MEDICATION LIST DOCUMENTED IN MEDICAL RECORD: ICD-10-PCS | Mod: S$GLB,,, | Performed by: NURSE PRACTITIONER

## 2021-06-16 PROCEDURE — 3288F FALL RISK ASSESSMENT DOCD: CPT | Mod: CPTII,S$GLB,, | Performed by: NURSE PRACTITIONER

## 2021-06-16 PROCEDURE — 1125F AMNT PAIN NOTED PAIN PRSNT: CPT | Mod: S$GLB,,, | Performed by: NURSE PRACTITIONER

## 2021-06-16 PROCEDURE — 3288F PR FALLS RISK ASSESSMENT DOCUMENTED: ICD-10-PCS | Mod: CPTII,S$GLB,, | Performed by: NURSE PRACTITIONER

## 2021-06-16 PROCEDURE — 1125F PR PAIN SEVERITY QUANTIFIED, PAIN PRESENT: ICD-10-PCS | Mod: S$GLB,,, | Performed by: NURSE PRACTITIONER

## 2021-06-16 PROCEDURE — 99999 PR PBB SHADOW E&M-EST. PATIENT-LVL V: CPT | Mod: PBBFAC,,, | Performed by: NURSE PRACTITIONER

## 2021-06-16 PROCEDURE — 99214 OFFICE O/P EST MOD 30 MIN: CPT | Mod: S$GLB,,, | Performed by: NURSE PRACTITIONER

## 2021-06-16 PROCEDURE — 1101F PT FALLS ASSESS-DOCD LE1/YR: CPT | Mod: CPTII,S$GLB,, | Performed by: NURSE PRACTITIONER

## 2021-06-16 PROCEDURE — 99214 PR OFFICE/OUTPT VISIT, EST, LEVL IV, 30-39 MIN: ICD-10-PCS | Mod: S$GLB,,, | Performed by: NURSE PRACTITIONER

## 2021-06-16 PROCEDURE — 1159F MED LIST DOCD IN RCRD: CPT | Mod: S$GLB,,, | Performed by: NURSE PRACTITIONER

## 2021-06-16 PROCEDURE — 99999 PR PBB SHADOW E&M-EST. PATIENT-LVL V: ICD-10-PCS | Mod: PBBFAC,,, | Performed by: NURSE PRACTITIONER

## 2021-06-16 PROCEDURE — 1101F PR PT FALLS ASSESS DOC 0-1 FALLS W/OUT INJ PAST YR: ICD-10-PCS | Mod: CPTII,S$GLB,, | Performed by: NURSE PRACTITIONER

## 2021-06-16 RX ORDER — ALPRAZOLAM 0.5 MG/1
0.5 TABLET ORAL NIGHTLY PRN
Qty: 30 TABLET | Refills: 5 | Status: SHIPPED | OUTPATIENT
Start: 2021-06-16 | End: 2022-02-21 | Stop reason: SDUPTHER

## 2021-06-17 ENCOUNTER — PATIENT MESSAGE (OUTPATIENT)
Dept: PRIMARY CARE CLINIC | Facility: CLINIC | Age: 86
End: 2021-06-17

## 2021-06-17 ENCOUNTER — OFFICE VISIT (OUTPATIENT)
Dept: CARDIOLOGY | Facility: CLINIC | Age: 86
End: 2021-06-17
Payer: MEDICARE

## 2021-06-17 VITALS
SYSTOLIC BLOOD PRESSURE: 174 MMHG | DIASTOLIC BLOOD PRESSURE: 62 MMHG | OXYGEN SATURATION: 93 % | BODY MASS INDEX: 32.48 KG/M2 | HEART RATE: 65 BPM | HEIGHT: 64 IN | WEIGHT: 190.25 LBS

## 2021-06-17 DIAGNOSIS — I48.11 LONGSTANDING PERSISTENT ATRIAL FIBRILLATION: ICD-10-CM

## 2021-06-17 DIAGNOSIS — I10 ESSENTIAL HYPERTENSION: Chronic | ICD-10-CM

## 2021-06-17 DIAGNOSIS — I11.9 HYPERTENSIVE LEFT VENTRICULAR HYPERTROPHY, WITHOUT HEART FAILURE: Chronic | ICD-10-CM

## 2021-06-17 DIAGNOSIS — I51.3 THROMBUS OF LEFT ATRIAL APPENDAGE: ICD-10-CM

## 2021-06-17 DIAGNOSIS — I73.9 PAD (PERIPHERAL ARTERY DISEASE): ICD-10-CM

## 2021-06-17 DIAGNOSIS — I49.3 PVC'S (PREMATURE VENTRICULAR CONTRACTIONS): ICD-10-CM

## 2021-06-17 DIAGNOSIS — N18.32 STAGE 3B CHRONIC KIDNEY DISEASE: ICD-10-CM

## 2021-06-17 DIAGNOSIS — Z79.01 CHRONIC ANTICOAGULATION: ICD-10-CM

## 2021-06-17 DIAGNOSIS — N19 HYPERTENSIVE HEART AND RENAL DISEASE WITH BOTH (CONGESTIVE) HEART FAILURE AND RENAL FAILURE: Primary | ICD-10-CM

## 2021-06-17 DIAGNOSIS — I13.2 HYPERTENSIVE HEART AND RENAL DISEASE WITH BOTH (CONGESTIVE) HEART FAILURE AND RENAL FAILURE: Primary | ICD-10-CM

## 2021-06-17 PROCEDURE — 99999 PR PBB SHADOW E&M-EST. PATIENT-LVL IV: CPT | Mod: PBBFAC,,, | Performed by: INTERNAL MEDICINE

## 2021-06-17 PROCEDURE — 1159F PR MEDICATION LIST DOCUMENTED IN MEDICAL RECORD: ICD-10-PCS | Mod: S$GLB,,, | Performed by: INTERNAL MEDICINE

## 2021-06-17 PROCEDURE — 99214 OFFICE O/P EST MOD 30 MIN: CPT | Mod: S$GLB,,, | Performed by: INTERNAL MEDICINE

## 2021-06-17 PROCEDURE — 99999 PR PBB SHADOW E&M-EST. PATIENT-LVL IV: ICD-10-PCS | Mod: PBBFAC,,, | Performed by: INTERNAL MEDICINE

## 2021-06-17 PROCEDURE — 99214 PR OFFICE/OUTPT VISIT, EST, LEVL IV, 30-39 MIN: ICD-10-PCS | Mod: S$GLB,,, | Performed by: INTERNAL MEDICINE

## 2021-06-17 PROCEDURE — 1159F MED LIST DOCD IN RCRD: CPT | Mod: S$GLB,,, | Performed by: INTERNAL MEDICINE

## 2021-06-17 RX ORDER — FUROSEMIDE 20 MG/1
20 TABLET ORAL
Qty: 30 TABLET | Refills: 1 | Status: SHIPPED | OUTPATIENT
Start: 2021-06-17 | End: 2021-12-10

## 2021-06-24 ENCOUNTER — OUTPATIENT CASE MANAGEMENT (OUTPATIENT)
Dept: ADMINISTRATIVE | Facility: OTHER | Age: 86
End: 2021-06-24

## 2021-06-28 ENCOUNTER — TELEPHONE (OUTPATIENT)
Dept: PRIMARY CARE CLINIC | Facility: CLINIC | Age: 86
End: 2021-06-28

## 2021-06-28 ENCOUNTER — PATIENT MESSAGE (OUTPATIENT)
Dept: PRIMARY CARE CLINIC | Facility: CLINIC | Age: 86
End: 2021-06-28

## 2021-06-28 DIAGNOSIS — G62.9 PERIPHERAL POLYNEUROPATHY: Primary | ICD-10-CM

## 2021-06-28 DIAGNOSIS — R26.81 UNSTEADINESS ON FEET: ICD-10-CM

## 2021-06-28 DIAGNOSIS — Z79.899 OTHER LONG TERM (CURRENT) DRUG THERAPY: ICD-10-CM

## 2021-06-30 ENCOUNTER — TELEPHONE (OUTPATIENT)
Dept: ADMINISTRATIVE | Facility: HOSPITAL | Age: 86
End: 2021-06-30

## 2021-07-13 ENCOUNTER — PATIENT MESSAGE (OUTPATIENT)
Dept: PRIMARY CARE CLINIC | Facility: CLINIC | Age: 86
End: 2021-07-13

## 2021-07-15 ENCOUNTER — PATIENT OUTREACH (OUTPATIENT)
Dept: ADMINISTRATIVE | Facility: HOSPITAL | Age: 86
End: 2021-07-15

## 2021-07-15 ENCOUNTER — OFFICE VISIT (OUTPATIENT)
Dept: PRIMARY CARE CLINIC | Facility: CLINIC | Age: 86
End: 2021-07-15
Payer: MEDICARE

## 2021-07-15 VITALS
HEART RATE: 67 BPM | HEIGHT: 64 IN | BODY MASS INDEX: 32.41 KG/M2 | DIASTOLIC BLOOD PRESSURE: 76 MMHG | SYSTOLIC BLOOD PRESSURE: 144 MMHG | OXYGEN SATURATION: 94 % | WEIGHT: 189.81 LBS | TEMPERATURE: 99 F

## 2021-07-15 DIAGNOSIS — I10 ESSENTIAL HYPERTENSION: Chronic | ICD-10-CM

## 2021-07-15 DIAGNOSIS — K40.90 UNILATERAL INGUINAL HERNIA WITHOUT OBSTRUCTION OR GANGRENE, RECURRENCE NOT SPECIFIED: ICD-10-CM

## 2021-07-15 DIAGNOSIS — R10.32 LEFT LOWER QUADRANT ABDOMINAL PAIN: Primary | ICD-10-CM

## 2021-07-15 DIAGNOSIS — J84.10 PULMONARY GRANULOMA: ICD-10-CM

## 2021-07-15 DIAGNOSIS — K40.21 BILATERAL RECURRENT INGUINAL HERNIA WITHOUT OBSTRUCTION OR GANGRENE: ICD-10-CM

## 2021-07-15 DIAGNOSIS — I48.11 LONGSTANDING PERSISTENT ATRIAL FIBRILLATION: ICD-10-CM

## 2021-07-15 DIAGNOSIS — M48.061 SPINAL STENOSIS AT L4-L5 LEVEL: ICD-10-CM

## 2021-07-15 DIAGNOSIS — R10.32 LLQ ABDOMINAL PAIN: ICD-10-CM

## 2021-07-15 PROCEDURE — 1101F PR PT FALLS ASSESS DOC 0-1 FALLS W/OUT INJ PAST YR: ICD-10-PCS | Mod: CPTII,S$GLB,, | Performed by: NURSE PRACTITIONER

## 2021-07-15 PROCEDURE — 99999 PR PBB SHADOW E&M-EST. PATIENT-LVL V: ICD-10-PCS | Mod: PBBFAC,,, | Performed by: NURSE PRACTITIONER

## 2021-07-15 PROCEDURE — 99499 UNLISTED E&M SERVICE: CPT | Mod: HCNC,S$GLB,, | Performed by: NURSE PRACTITIONER

## 2021-07-15 PROCEDURE — 99999 PR PBB SHADOW E&M-EST. PATIENT-LVL V: CPT | Mod: PBBFAC,,, | Performed by: NURSE PRACTITIONER

## 2021-07-15 PROCEDURE — 1159F PR MEDICATION LIST DOCUMENTED IN MEDICAL RECORD: ICD-10-PCS | Mod: S$GLB,,, | Performed by: NURSE PRACTITIONER

## 2021-07-15 PROCEDURE — 99215 OFFICE O/P EST HI 40 MIN: CPT | Mod: S$GLB,,, | Performed by: NURSE PRACTITIONER

## 2021-07-15 PROCEDURE — 3288F PR FALLS RISK ASSESSMENT DOCUMENTED: ICD-10-PCS | Mod: CPTII,S$GLB,, | Performed by: NURSE PRACTITIONER

## 2021-07-15 PROCEDURE — 99215 PR OFFICE/OUTPT VISIT, EST, LEVL V, 40-54 MIN: ICD-10-PCS | Mod: S$GLB,,, | Performed by: NURSE PRACTITIONER

## 2021-07-15 PROCEDURE — 1125F AMNT PAIN NOTED PAIN PRSNT: CPT | Mod: S$GLB,,, | Performed by: NURSE PRACTITIONER

## 2021-07-15 PROCEDURE — 1159F MED LIST DOCD IN RCRD: CPT | Mod: S$GLB,,, | Performed by: NURSE PRACTITIONER

## 2021-07-15 PROCEDURE — 1125F PR PAIN SEVERITY QUANTIFIED, PAIN PRESENT: ICD-10-PCS | Mod: S$GLB,,, | Performed by: NURSE PRACTITIONER

## 2021-07-15 PROCEDURE — 99499 RISK ADDL DX/OHS AUDIT: ICD-10-PCS | Mod: HCNC,S$GLB,, | Performed by: NURSE PRACTITIONER

## 2021-07-15 PROCEDURE — 3288F FALL RISK ASSESSMENT DOCD: CPT | Mod: CPTII,S$GLB,, | Performed by: NURSE PRACTITIONER

## 2021-07-15 PROCEDURE — 1101F PT FALLS ASSESS-DOCD LE1/YR: CPT | Mod: CPTII,S$GLB,, | Performed by: NURSE PRACTITIONER

## 2021-07-15 RX ORDER — ISOSORBIDE MONONITRATE 30 MG/1
30 TABLET, EXTENDED RELEASE ORAL 2 TIMES DAILY
Qty: 90 TABLET | Refills: 1 | Status: SHIPPED | OUTPATIENT
Start: 2021-07-15 | End: 2021-07-15

## 2021-07-19 ENCOUNTER — LAB VISIT (OUTPATIENT)
Dept: LAB | Facility: HOSPITAL | Age: 86
End: 2021-07-19
Attending: INTERNAL MEDICINE
Payer: MEDICARE

## 2021-07-19 ENCOUNTER — OFFICE VISIT (OUTPATIENT)
Dept: OPHTHALMOLOGY | Facility: CLINIC | Age: 86
End: 2021-07-19
Payer: MEDICARE

## 2021-07-19 DIAGNOSIS — H40.1131 PRIMARY OPEN ANGLE GLAUCOMA (POAG) OF BOTH EYES, MILD STAGE: Primary | ICD-10-CM

## 2021-07-19 DIAGNOSIS — Z94.7 HISTORY OF CORNEA TRANSPLANT: ICD-10-CM

## 2021-07-19 DIAGNOSIS — H04.123 DRY EYES, BILATERAL: ICD-10-CM

## 2021-07-19 DIAGNOSIS — E78.2 MIXED HYPERLIPIDEMIA: ICD-10-CM

## 2021-07-19 DIAGNOSIS — R26.81 UNSTEADINESS ON FEET: ICD-10-CM

## 2021-07-19 DIAGNOSIS — G62.9 PERIPHERAL POLYNEUROPATHY: ICD-10-CM

## 2021-07-19 DIAGNOSIS — Z79.899 OTHER LONG TERM (CURRENT) DRUG THERAPY: ICD-10-CM

## 2021-07-19 DIAGNOSIS — Z96.1 PSEUDOPHAKIA: ICD-10-CM

## 2021-07-19 LAB
CHOLEST SERPL-MCNC: 232 MG/DL (ref 120–199)
CHOLEST/HDLC SERPL: 4.8 {RATIO} (ref 2–5)
FOLATE SERPL-MCNC: 11.2 NG/ML (ref 4–24)
HDLC SERPL-MCNC: 48 MG/DL (ref 40–75)
HDLC SERPL: 20.7 % (ref 20–50)
LDLC SERPL CALC-MCNC: 155.8 MG/DL (ref 63–159)
NONHDLC SERPL-MCNC: 184 MG/DL
TRIGL SERPL-MCNC: 141 MG/DL (ref 30–150)
VIT B12 SERPL-MCNC: 270 PG/ML (ref 210–950)

## 2021-07-19 PROCEDURE — 99214 PR OFFICE/OUTPT VISIT, EST, LEVL IV, 30-39 MIN: ICD-10-PCS | Mod: S$GLB,,, | Performed by: OPHTHALMOLOGY

## 2021-07-19 PROCEDURE — 80061 LIPID PANEL: CPT | Performed by: INTERNAL MEDICINE

## 2021-07-19 PROCEDURE — 92083 EXTENDED VISUAL FIELD XM: CPT | Mod: S$GLB,,, | Performed by: OPHTHALMOLOGY

## 2021-07-19 PROCEDURE — 83921 ORGANIC ACID SINGLE QUANT: CPT | Performed by: NURSE PRACTITIONER

## 2021-07-19 PROCEDURE — 92083 HUMPHREY VISUAL FIELD - OU - BOTH EYES: ICD-10-PCS | Mod: S$GLB,,, | Performed by: OPHTHALMOLOGY

## 2021-07-19 PROCEDURE — 1159F PR MEDICATION LIST DOCUMENTED IN MEDICAL RECORD: ICD-10-PCS | Mod: CPTII,S$GLB,, | Performed by: OPHTHALMOLOGY

## 2021-07-19 PROCEDURE — 82746 ASSAY OF FOLIC ACID SERUM: CPT | Performed by: NURSE PRACTITIONER

## 2021-07-19 PROCEDURE — 1159F MED LIST DOCD IN RCRD: CPT | Mod: CPTII,S$GLB,, | Performed by: OPHTHALMOLOGY

## 2021-07-19 PROCEDURE — 82607 VITAMIN B-12: CPT | Performed by: NURSE PRACTITIONER

## 2021-07-19 PROCEDURE — 99999 PR PBB SHADOW E&M-EST. PATIENT-LVL III: ICD-10-PCS | Mod: PBBFAC,,, | Performed by: OPHTHALMOLOGY

## 2021-07-19 PROCEDURE — 99999 PR PBB SHADOW E&M-EST. PATIENT-LVL III: CPT | Mod: PBBFAC,,, | Performed by: OPHTHALMOLOGY

## 2021-07-19 PROCEDURE — 99214 OFFICE O/P EST MOD 30 MIN: CPT | Mod: S$GLB,,, | Performed by: OPHTHALMOLOGY

## 2021-07-20 ENCOUNTER — TELEPHONE (OUTPATIENT)
Dept: RADIOLOGY | Facility: HOSPITAL | Age: 86
End: 2021-07-20

## 2021-07-21 ENCOUNTER — HOSPITAL ENCOUNTER (OUTPATIENT)
Dept: RADIOLOGY | Facility: HOSPITAL | Age: 86
Discharge: HOME | End: 2021-07-21
Attending: NURSE PRACTITIONER
Payer: MEDICARE

## 2021-07-21 DIAGNOSIS — K40.90 UNILATERAL INGUINAL HERNIA WITHOUT OBSTRUCTION OR GANGRENE, RECURRENCE NOT SPECIFIED: ICD-10-CM

## 2021-07-21 DIAGNOSIS — R10.32 LEFT LOWER QUADRANT ABDOMINAL PAIN: ICD-10-CM

## 2021-07-21 PROCEDURE — 76705 US SOFT TISSUE, ABDOMEN: ICD-10-PCS | Mod: 26,,, | Performed by: RADIOLOGY

## 2021-07-21 PROCEDURE — 76705 ECHO EXAM OF ABDOMEN: CPT | Mod: TC

## 2021-07-21 PROCEDURE — 76705 ECHO EXAM OF ABDOMEN: CPT | Mod: 26,,, | Performed by: RADIOLOGY

## 2021-07-22 ENCOUNTER — TELEPHONE (OUTPATIENT)
Dept: CARDIOLOGY | Facility: CLINIC | Age: 86
End: 2021-07-22

## 2021-07-22 ENCOUNTER — PATIENT MESSAGE (OUTPATIENT)
Dept: ORTHOPEDICS | Facility: CLINIC | Age: 86
End: 2021-07-22

## 2021-07-22 RX ORDER — LOSARTAN POTASSIUM 50 MG/1
50 TABLET ORAL 2 TIMES DAILY
Qty: 60 TABLET | Refills: 5 | Status: SHIPPED | OUTPATIENT
Start: 2021-07-22 | End: 2021-11-30 | Stop reason: SDUPTHER

## 2021-07-23 LAB — METHYLMALONATE SERPL-SCNC: 0.44 UMOL/L

## 2021-08-03 ENCOUNTER — INITIAL CONSULT (OUTPATIENT)
Dept: VASCULAR SURGERY | Facility: CLINIC | Age: 86
End: 2021-08-03
Payer: MEDICARE

## 2021-08-03 ENCOUNTER — PATIENT OUTREACH (OUTPATIENT)
Dept: ADMINISTRATIVE | Facility: OTHER | Age: 86
End: 2021-08-03

## 2021-08-03 VITALS
WEIGHT: 191.13 LBS | DIASTOLIC BLOOD PRESSURE: 79 MMHG | HEART RATE: 71 BPM | SYSTOLIC BLOOD PRESSURE: 181 MMHG | BODY MASS INDEX: 32.81 KG/M2

## 2021-08-03 DIAGNOSIS — I73.9 PAD (PERIPHERAL ARTERY DISEASE): ICD-10-CM

## 2021-08-03 DIAGNOSIS — M79.605 PAIN IN LEFT LEG: Primary | ICD-10-CM

## 2021-08-03 PROCEDURE — 99999 PR PBB SHADOW E&M-EST. PATIENT-LVL III: CPT | Mod: PBBFAC,,, | Performed by: SURGERY

## 2021-08-03 PROCEDURE — 99999 PR PBB SHADOW E&M-EST. PATIENT-LVL III: ICD-10-PCS | Mod: PBBFAC,,, | Performed by: SURGERY

## 2021-08-04 ENCOUNTER — TELEPHONE (OUTPATIENT)
Dept: DERMATOLOGY | Facility: CLINIC | Age: 86
End: 2021-08-04

## 2021-08-09 ENCOUNTER — PATIENT MESSAGE (OUTPATIENT)
Dept: ORTHOPEDICS | Facility: CLINIC | Age: 86
End: 2021-08-09

## 2021-08-18 ENCOUNTER — PATIENT MESSAGE (OUTPATIENT)
Dept: ORTHOPEDICS | Facility: CLINIC | Age: 86
End: 2021-08-18

## 2021-08-23 NOTE — PROGRESS NOTES
Subjective:   Patient ID:  Shirley Metz is a 87 y.o. female who presents for follow up of No chief complaint on file.      88 yo female. came in HTN and afib  PMH pafib, + positive SELVIN, HTN, HLD chfpEF and CKD, s/p corneal Tx   echo showed EF 60%. LVH   Nulcear stress test no ischemia  10/07 saw pt in the office for C/o recent weakness,  stomach upset,. Has fast pulse. Some dizziness, and Monet. ekg showed new onset afib with RVR. Increased Mwetoprolol to 100 mg bid and add cardizem and eliquis 2.5 m bid.  10/09, admitted to [facility] for chest pain. Troponin 0.035 chronic elevation. Afib with RVR  . Echo showed normal EF and mild MR> Next day, STACEY showed + SELVIN thrombus and DCCV cancelled. Continued Eliquis  12/07 repeated STACEY no SELVIN thrombus and DCCV x1 converted to sinus   LUM arterial and carotid US showed mild Dz.    went to ER due to severe left arm pain. Troponin 0.03 and lung perfusion scan negative for PE    Pt has had multiple allergies to HTN medication and multiple ER visits for uncontrolled HTN.   In the past 3 moths, felt better. Walks longer, more energy and less MONET. Sleeps with two pillows. Improved headache and blurred vision. Occasional skin ictching.   SBP high in  to 150 mmHG. Sometime 140 mmHG   Pt is satisfied her BP Her pulse was at 45 to 55 bpm.    And not took her clonidine for a week.    Today states that ok to continue Losartan 100 mg daily  Daily taking clonidine 1 mg daily  Continue Metoprolol 25 mg bid              Past Medical History:   Diagnosis Date    Anemia 11/29/2018    Anxiety 11/28/2017    Arthritis     Atrial fibrillation with RVR 10/9/2019    Back pain     Basal cell carcinoma 03/29/2018    left mid back    Chronic diastolic congestive heart failure 10/15/2019    Colon polyps     reported per patient.     Fuchs' corneal dystrophy     General anesthetics causing adverse effect in therapeutic use     woke up during  Summary of Your Rheumatology Visit    Next Appointment:  6 Months    Medications:      Referrals:      Tests:     Urine sample today.    Please have labs performed in about 2-3 months.       Injections:        Other:         surgery and gagged on ET tube    Glaucoma     Glaucoma     Heart failure with preserved left ventricular function (HFpEF) 7/24/2018    Hyperlipidemia     Hypertension     Macular degeneration dry    Obesity     Squamous cell carcinoma 01/08/2019    left shoulder    Trouble in sleeping     Urinary tract infection        Past Surgical History:   Procedure Laterality Date    ADENOIDECTOMY  1938    BREAST BIOPSY Left     1997. benign    BREAST CYST EXCISION Left 1997 approx    CARPAL TUNNEL RELEASE Right 2012 approx    CATARACT EXTRACTION Bilateral 1994    CHOLECYSTECTOMY  1975    CORNEAL TRANSPLANT Bilateral 08/2015 8/2015 - left; Right 4/2016    EYE SURGERY      Fuch's Corneal dystrophy - had 2nd operation with Dr. Quintanilla    EYE SURGERY      Cataract wIOL    left thumb Left 2011    removed cuboid for arthritis    REVERSE TOTAL SHOULDER ARTHROPLASTY Left 8/21/2018    Procedure: ARTHROPLASTY, SHOULDER, TOTAL, REVERSE;  Surgeon: Solomon Lujan MD;  Location: [facility] OR;  Service: Orthopedics;  Laterality: Left;  WITH BICEP TENODESIS    SKIN CANCER EXCISION Left 2017    BCC removal by Dr. Valadez    SLT- OS (aka TRABECULOPLASTY) Left 05/11/2011    repeat 3/14/12    TENOTOMY Left 8/21/2018    Procedure: TENOTOMY;  Surgeon: Solomon Lujan MD;  Location: [facility] OR;  Service: Orthopedics;  Laterality: Left;    TONSILLECTOMY  1938    TREATMENT OF CARDIAC ARRHYTHMIA N/A 10/10/2019    Procedure: CARDIOVERSION;  Surgeon: Pete Durán MD;  Location: [facility] CATH LAB;  Service: Cardiology;  Laterality: N/A;    TREATMENT OF CARDIAC ARRHYTHMIA N/A 12/6/2019    Procedure: CARDIOVERSION;  Surgeon: Samy Castillo MD;  Location: [facility] CATH LAB;  Service: Cardiology;  Laterality: N/A;       Social History     Tobacco Use    Smoking status: Never Smoker    Smokeless tobacco: Never Used    Tobacco comment: history of passive smoking from her ex-   Substance Use Topics    Alcohol use: Not Currently      Alcohol/week: 2.0 standard drinks     Types: 2 Standard drinks or equivalent per week     Frequency: Monthly or less     Drinks per session: 1 or 2     Binge frequency: Never     Comment: occasional     Drug use: No       Family History   Problem Relation Age of Onset    Heart disease Mother     Hypertension Mother     Cancer Father 88        sarcoma( ?Kaposi's ) on leg    Deep vein thrombosis Neg Hx     Ovarian cancer Neg Hx     Breast cancer Neg Hx     Kidney disease Neg Hx     Strabismus Neg Hx     Retinal detachment Neg Hx     Macular degeneration Neg Hx     Glaucoma Neg Hx     Blindness Neg Hx     Amblyopia Neg Hx     Eczema Neg Hx     Lupus Neg Hx     Melanoma Neg Hx     Psoriasis Neg Hx     Diabetes Neg Hx     Stroke Neg Hx     Mental retardation Neg Hx     Mental illness Neg Hx     Hyperlipidemia Neg Hx     COPD Neg Hx     Asthma Neg Hx     Depression Neg Hx     Alcohol abuse Neg Hx     Drug abuse Neg Hx          Review of Systems   Constitution: Positive for malaise/fatigue and weight loss. Negative for decreased appetite, diaphoresis, fever and night sweats.   HENT: Negative for nosebleeds.    Eyes: Negative for blurred vision and double vision.   Cardiovascular: Positive for palpitations. Negative for chest pain, claudication, irregular heartbeat, leg swelling, near-syncope, orthopnea, paroxysmal nocturnal dyspnea and syncope.   Respiratory: Negative for cough, shortness of breath, sleep disturbances due to breathing, snoring, sputum production and wheezing.    Endocrine: Negative for cold intolerance and polyuria.   Hematologic/Lymphatic: Does not bruise/bleed easily.   Skin: Negative for rash.   Musculoskeletal: Negative for back pain, falls, joint pain, joint swelling and neck pain.   Gastrointestinal: Positive for abdominal pain. Negative for heartburn, nausea and vomiting.   Genitourinary: Negative for dysuria, frequency and hematuria.   Neurological: Negative for  difficulty with concentration, dizziness, focal weakness, headaches, light-headedness, numbness, seizures and weakness.   Psychiatric/Behavioral: Negative for depression, memory loss and substance abuse. The patient does not have insomnia.    Allergic/Immunologic: Negative for HIV exposure and hives.       Objective:   Physical Exam   Constitutional: She is oriented to person, place, and time. She appears well-nourished.   HENT:   Head: Normocephalic.   Eyes: Pupils are equal, round, and reactive to light.   Neck: Normal carotid pulses and no JVD present. Carotid bruit is not present. No thyromegaly present.   Cardiovascular: Normal rate, regular rhythm, normal heart sounds and normal pulses.  No extrasystoles are present. PMI is not displaced. Exam reveals no gallop and no S3.   No murmur heard.  Pulmonary/Chest: Breath sounds normal. No stridor. No respiratory distress.   Abdominal: Soft. Bowel sounds are normal. There is no tenderness. There is no rebound.   Musculoskeletal: Normal range of motion.   Neurological: She is alert and oriented to person, place, and time.   Skin: Skin is intact. No rash noted.   Psychiatric: Her behavior is normal.       Lab Results   Component Value Date    CHOL 233 (H) 08/01/2019    CHOL 180 03/16/2018    CHOL 181 02/19/2018     Lab Results   Component Value Date    HDL 49 08/01/2019    HDL 43 03/16/2018    HDL 45 02/19/2018     Lab Results   Component Value Date    LDLCALC 155.0 08/01/2019    LDLCALC 117.6 03/16/2018    LDLCALC 118.2 02/19/2018     Lab Results   Component Value Date    TRIG 145 08/01/2019    TRIG 97 03/16/2018    TRIG 89 02/19/2018     Lab Results   Component Value Date    CHOLHDL 21.0 08/01/2019    CHOLHDL 23.9 03/16/2018    CHOLHDL 24.9 02/19/2018       Chemistry        Component Value Date/Time     02/19/2020 0855    K 4.0 02/19/2020 0855     02/19/2020 0855    CO2 27 02/19/2020 0855    BUN 31 (H) 02/19/2020 0855    CREATININE 1.6 (H) 02/19/2020 0855      02/19/2020 0855        Component Value Date/Time    CALCIUM 10.2 02/19/2020 0855    ALKPHOS 80 02/19/2020 0855    AST 20 02/19/2020 0855    ALT 16 02/19/2020 0855    BILITOT 0.8 02/19/2020 0855    ESTGFRAFRICA 33 (A) 02/19/2020 0855    EGFRNONAA 29 (A) 02/19/2020 0855          Lab Results   Component Value Date    HGBA1C 6.4 (H) 01/08/2020     Lab Results   Component Value Date    TSH 0.849 10/09/2019     Lab Results   Component Value Date    INR 1.0 02/12/2020    INR 1.0 01/25/2018    INR 1.0 01/01/2017     Lab Results   Component Value Date    WBC 7.99 02/19/2020    HGB 14.0 02/19/2020    HCT 43.1 02/19/2020    MCV 98 02/19/2020     02/19/2020     BMP  Sodium   Date Value Ref Range Status   02/19/2020 142 136 - 145 mmol/L Final     Potassium   Date Value Ref Range Status   02/19/2020 4.0 3.5 - 5.1 mmol/L Final     Chloride   Date Value Ref Range Status   02/19/2020 103 95 - 110 mmol/L Final     CO2   Date Value Ref Range Status   02/19/2020 27 23 - 29 mmol/L Final     BUN, Bld   Date Value Ref Range Status   02/19/2020 31 (H) 8 - 23 mg/dL Final     Creatinine   Date Value Ref Range Status   02/19/2020 1.6 (H) 0.5 - 1.4 mg/dL Final     Calcium   Date Value Ref Range Status   02/19/2020 10.2 8.7 - 10.5 mg/dL Final     Anion Gap   Date Value Ref Range Status   02/19/2020 12 8 - 16 mmol/L Final     eGFR if    Date Value Ref Range Status   02/19/2020 33 (A) >60 mL/min/1.73 m^2 Final     eGFR if non    Date Value Ref Range Status   02/19/2020 29 (A) >60 mL/min/1.73 m^2 Final     Comment:     Calculation used to obtain the estimated glomerular filtration  rate (eGFR) is the CKD-EPI equation.        BNP  @LABRCNTIP(BNP,BNPTRIAGEBLO)@  @LABRCNTIP(troponini)@  CrCl cannot be calculated (Patient's most recent lab result is older than the maximum 7 days allowed.).  No results found in the last 24 hours.  No results found in the last 24 hours.  No results found in the last 24  hours.    Assessment:      1. Essential hypertension    2. Mixed hyperlipidemia    3. Heart failure with preserved left ventricular function (HFpEF)    4. Thrombus of left atrial appendage      Pt is allergic multiple HTN medications, she had better controlled SBP at home and happy current Rx regimen  Plan:   Continue Amio 100 mg daily, and eliuis 2.5 mg bid  Continue Lopressor 12.5 mg bid losartan 50 mg bid, imdur 60 mg HCTZ 25 mg daily  Counseled DASH  Recommend heart-healthy diet, weight control and regular exercise.  Shannen. Risk modification.   RTC in 3 months    I have reviewed all pertinent labs and cardiac studies. Plans and recommendations have been formulated under my direct supervision. All questions answered and patient voiced understanding. Patient to continue current medications.

## 2021-08-24 ENCOUNTER — TELEPHONE (OUTPATIENT)
Dept: PRIMARY CARE CLINIC | Facility: CLINIC | Age: 86
End: 2021-08-24

## 2021-08-25 ENCOUNTER — OFFICE VISIT (OUTPATIENT)
Dept: PRIMARY CARE CLINIC | Facility: CLINIC | Age: 86
End: 2021-08-25
Payer: MEDICARE

## 2021-08-25 VITALS
WEIGHT: 188.69 LBS | TEMPERATURE: 99 F | HEART RATE: 73 BPM | BODY MASS INDEX: 32.39 KG/M2 | DIASTOLIC BLOOD PRESSURE: 58 MMHG | OXYGEN SATURATION: 94 % | SYSTOLIC BLOOD PRESSURE: 138 MMHG

## 2021-08-25 DIAGNOSIS — N18.4 CKD STAGE 4 SECONDARY TO HYPERTENSION: ICD-10-CM

## 2021-08-25 DIAGNOSIS — I12.9 CKD STAGE 4 SECONDARY TO HYPERTENSION: ICD-10-CM

## 2021-08-25 DIAGNOSIS — I48.11 LONGSTANDING PERSISTENT ATRIAL FIBRILLATION: ICD-10-CM

## 2021-08-25 DIAGNOSIS — L03.211 CELLULITIS, FACE: ICD-10-CM

## 2021-08-25 PROCEDURE — 99215 OFFICE O/P EST HI 40 MIN: CPT | Mod: S$GLB,,, | Performed by: NURSE PRACTITIONER

## 2021-08-25 PROCEDURE — 99999 PR PBB SHADOW E&M-EST. PATIENT-LVL IV: CPT | Mod: PBBFAC,,, | Performed by: NURSE PRACTITIONER

## 2021-08-25 PROCEDURE — 99215 PR OFFICE/OUTPT VISIT, EST, LEVL V, 40-54 MIN: ICD-10-PCS | Mod: S$GLB,,, | Performed by: NURSE PRACTITIONER

## 2021-08-25 PROCEDURE — 1159F PR MEDICATION LIST DOCUMENTED IN MEDICAL RECORD: ICD-10-PCS | Mod: CPTII,S$GLB,, | Performed by: NURSE PRACTITIONER

## 2021-08-25 PROCEDURE — 1125F PR PAIN SEVERITY QUANTIFIED, PAIN PRESENT: ICD-10-PCS | Mod: CPTII,S$GLB,, | Performed by: NURSE PRACTITIONER

## 2021-08-25 PROCEDURE — 1159F MED LIST DOCD IN RCRD: CPT | Mod: CPTII,S$GLB,, | Performed by: NURSE PRACTITIONER

## 2021-08-25 PROCEDURE — 99999 PR PBB SHADOW E&M-EST. PATIENT-LVL IV: ICD-10-PCS | Mod: PBBFAC,,, | Performed by: NURSE PRACTITIONER

## 2021-08-25 PROCEDURE — 1125F AMNT PAIN NOTED PAIN PRSNT: CPT | Mod: CPTII,S$GLB,, | Performed by: NURSE PRACTITIONER

## 2021-08-25 RX ORDER — ISOSORBIDE MONONITRATE 30 MG/1
30 TABLET, EXTENDED RELEASE ORAL 2 TIMES DAILY
Qty: 60 TABLET | Refills: 11 | Status: SHIPPED | OUTPATIENT
Start: 2021-08-25 | End: 2021-11-30

## 2021-08-25 RX ORDER — CLINDAMYCIN HYDROCHLORIDE 300 MG/1
300 CAPSULE ORAL 3 TIMES DAILY
Qty: 42 CAPSULE | Refills: 0 | Status: SHIPPED | OUTPATIENT
Start: 2021-08-25 | End: 2021-09-08

## 2021-08-25 RX ORDER — CLINDAMYCIN HYDROCHLORIDE 150 MG/1
150 CAPSULE ORAL 3 TIMES DAILY
Qty: 42 CAPSULE | Refills: 0 | Status: SHIPPED | OUTPATIENT
Start: 2021-08-25 | End: 2021-09-08

## 2021-08-27 ENCOUNTER — OFFICE VISIT (OUTPATIENT)
Dept: PRIMARY CARE CLINIC | Facility: CLINIC | Age: 86
End: 2021-08-27
Payer: MEDICARE

## 2021-08-27 VITALS
SYSTOLIC BLOOD PRESSURE: 132 MMHG | OXYGEN SATURATION: 92 % | HEART RATE: 62 BPM | HEIGHT: 64 IN | WEIGHT: 190.94 LBS | BODY MASS INDEX: 32.6 KG/M2 | TEMPERATURE: 99 F | DIASTOLIC BLOOD PRESSURE: 70 MMHG

## 2021-08-27 DIAGNOSIS — Z79.01 CHRONIC ANTICOAGULATION: ICD-10-CM

## 2021-08-27 DIAGNOSIS — L03.211 CELLULITIS, FACE: ICD-10-CM

## 2021-08-27 PROCEDURE — 3288F FALL RISK ASSESSMENT DOCD: CPT | Mod: CPTII,S$GLB,, | Performed by: NURSE PRACTITIONER

## 2021-08-27 PROCEDURE — 99213 PR OFFICE/OUTPT VISIT, EST, LEVL III, 20-29 MIN: ICD-10-PCS | Mod: S$GLB,,, | Performed by: NURSE PRACTITIONER

## 2021-08-27 PROCEDURE — 99213 OFFICE O/P EST LOW 20 MIN: CPT | Mod: S$GLB,,, | Performed by: NURSE PRACTITIONER

## 2021-08-27 PROCEDURE — 99999 PR PBB SHADOW E&M-EST. PATIENT-LVL IV: ICD-10-PCS | Mod: PBBFAC,,, | Performed by: NURSE PRACTITIONER

## 2021-08-27 PROCEDURE — 1159F MED LIST DOCD IN RCRD: CPT | Mod: CPTII,S$GLB,, | Performed by: NURSE PRACTITIONER

## 2021-08-27 PROCEDURE — 1101F PR PT FALLS ASSESS DOC 0-1 FALLS W/OUT INJ PAST YR: ICD-10-PCS | Mod: CPTII,S$GLB,, | Performed by: NURSE PRACTITIONER

## 2021-08-27 PROCEDURE — 3288F PR FALLS RISK ASSESSMENT DOCUMENTED: ICD-10-PCS | Mod: CPTII,S$GLB,, | Performed by: NURSE PRACTITIONER

## 2021-08-27 PROCEDURE — 1126F PR PAIN SEVERITY QUANTIFIED, NO PAIN PRESENT: ICD-10-PCS | Mod: CPTII,S$GLB,, | Performed by: NURSE PRACTITIONER

## 2021-08-27 PROCEDURE — 99999 PR PBB SHADOW E&M-EST. PATIENT-LVL IV: CPT | Mod: PBBFAC,,, | Performed by: NURSE PRACTITIONER

## 2021-08-27 PROCEDURE — 1126F AMNT PAIN NOTED NONE PRSNT: CPT | Mod: CPTII,S$GLB,, | Performed by: NURSE PRACTITIONER

## 2021-08-27 PROCEDURE — 1159F PR MEDICATION LIST DOCUMENTED IN MEDICAL RECORD: ICD-10-PCS | Mod: CPTII,S$GLB,, | Performed by: NURSE PRACTITIONER

## 2021-08-27 PROCEDURE — 1101F PT FALLS ASSESS-DOCD LE1/YR: CPT | Mod: CPTII,S$GLB,, | Performed by: NURSE PRACTITIONER

## 2021-09-09 ENCOUNTER — OFFICE VISIT (OUTPATIENT)
Dept: OPHTHALMOLOGY | Facility: CLINIC | Age: 86
End: 2021-09-09
Payer: MEDICARE

## 2021-09-09 DIAGNOSIS — M35.01 KERATITIS SICCA, BILATERAL: ICD-10-CM

## 2021-09-09 DIAGNOSIS — H52.7 REFRACTIVE ERROR: ICD-10-CM

## 2021-09-09 DIAGNOSIS — Z94.7 HISTORY OF CORNEA TRANSPLANT: ICD-10-CM

## 2021-09-09 DIAGNOSIS — H40.1131 PRIMARY OPEN ANGLE GLAUCOMA (POAG) OF BOTH EYES, MILD STAGE: Primary | ICD-10-CM

## 2021-09-09 DIAGNOSIS — Z96.1 PSEUDOPHAKIA: ICD-10-CM

## 2021-09-09 PROCEDURE — 99999 PR PBB SHADOW E&M-EST. PATIENT-LVL II: ICD-10-PCS | Mod: PBBFAC,,, | Performed by: OPHTHALMOLOGY

## 2021-09-09 PROCEDURE — 92015 PR REFRACTION: ICD-10-PCS | Mod: S$GLB,,, | Performed by: OPHTHALMOLOGY

## 2021-09-09 PROCEDURE — 99214 PR OFFICE/OUTPT VISIT, EST, LEVL IV, 30-39 MIN: ICD-10-PCS | Mod: S$GLB,,, | Performed by: OPHTHALMOLOGY

## 2021-09-09 PROCEDURE — 1159F PR MEDICATION LIST DOCUMENTED IN MEDICAL RECORD: ICD-10-PCS | Mod: CPTII,S$GLB,, | Performed by: OPHTHALMOLOGY

## 2021-09-09 PROCEDURE — 1160F RVW MEDS BY RX/DR IN RCRD: CPT | Mod: CPTII,S$GLB,, | Performed by: OPHTHALMOLOGY

## 2021-09-09 PROCEDURE — 99214 OFFICE O/P EST MOD 30 MIN: CPT | Mod: S$GLB,,, | Performed by: OPHTHALMOLOGY

## 2021-09-09 PROCEDURE — 99999 PR PBB SHADOW E&M-EST. PATIENT-LVL II: CPT | Mod: PBBFAC,,, | Performed by: OPHTHALMOLOGY

## 2021-09-09 PROCEDURE — 92015 DETERMINE REFRACTIVE STATE: CPT | Mod: S$GLB,,, | Performed by: OPHTHALMOLOGY

## 2021-09-09 PROCEDURE — 1160F PR REVIEW ALL MEDS BY PRESCRIBER/CLIN PHARMACIST DOCUMENTED: ICD-10-PCS | Mod: CPTII,S$GLB,, | Performed by: OPHTHALMOLOGY

## 2021-09-09 PROCEDURE — 1159F MED LIST DOCD IN RCRD: CPT | Mod: CPTII,S$GLB,, | Performed by: OPHTHALMOLOGY

## 2021-09-09 RX ORDER — KETOROLAC TROMETHAMINE 5 MG/ML
SOLUTION OPHTHALMIC
Qty: 1 BOTTLE | Refills: 0 | Status: SHIPPED | OUTPATIENT
Start: 2021-09-09 | End: 2021-12-15

## 2021-09-13 ENCOUNTER — PATIENT OUTREACH (OUTPATIENT)
Dept: ADMINISTRATIVE | Facility: OTHER | Age: 86
End: 2021-09-13

## 2021-09-13 ENCOUNTER — TELEPHONE (OUTPATIENT)
Dept: INTERNAL MEDICINE | Facility: CLINIC | Age: 86
End: 2021-09-13

## 2021-09-14 ENCOUNTER — TELEPHONE (OUTPATIENT)
Dept: INTERNAL MEDICINE | Facility: CLINIC | Age: 86
End: 2021-09-14

## 2021-09-15 ENCOUNTER — OFFICE VISIT (OUTPATIENT)
Dept: DERMATOLOGY | Facility: CLINIC | Age: 86
End: 2021-09-15
Payer: MEDICARE

## 2021-09-15 ENCOUNTER — TELEPHONE (OUTPATIENT)
Dept: FAMILY MEDICINE | Facility: CLINIC | Age: 86
End: 2021-09-15

## 2021-09-15 DIAGNOSIS — L90.5 SCAR CONDITIONS/SKIN FIBROSIS: Primary | ICD-10-CM

## 2021-09-15 DIAGNOSIS — Z12.83 SCREENING EXAM FOR SKIN CANCER: ICD-10-CM

## 2021-09-15 DIAGNOSIS — L21.9 SEBORRHEIC DERMATITIS: ICD-10-CM

## 2021-09-15 DIAGNOSIS — Z12.83 SCREENING, MALIGNANT NEOPLASM, SKIN: ICD-10-CM

## 2021-09-15 PROCEDURE — 3288F PR FALLS RISK ASSESSMENT DOCUMENTED: ICD-10-PCS | Mod: CPTII,S$GLB,, | Performed by: DERMATOLOGY

## 2021-09-15 PROCEDURE — 1160F PR REVIEW ALL MEDS BY PRESCRIBER/CLIN PHARMACIST DOCUMENTED: ICD-10-PCS | Mod: CPTII,S$GLB,, | Performed by: DERMATOLOGY

## 2021-09-15 PROCEDURE — 1160F RVW MEDS BY RX/DR IN RCRD: CPT | Mod: CPTII,S$GLB,, | Performed by: DERMATOLOGY

## 2021-09-15 PROCEDURE — 99999 PR PBB SHADOW E&M-EST. PATIENT-LVL III: CPT | Mod: PBBFAC,,, | Performed by: DERMATOLOGY

## 2021-09-15 PROCEDURE — 99999 PR PBB SHADOW E&M-EST. PATIENT-LVL III: ICD-10-PCS | Mod: PBBFAC,,, | Performed by: DERMATOLOGY

## 2021-09-15 PROCEDURE — 1101F PT FALLS ASSESS-DOCD LE1/YR: CPT | Mod: CPTII,S$GLB,, | Performed by: DERMATOLOGY

## 2021-09-15 PROCEDURE — 1126F AMNT PAIN NOTED NONE PRSNT: CPT | Mod: CPTII,S$GLB,, | Performed by: DERMATOLOGY

## 2021-09-15 PROCEDURE — 1126F PR PAIN SEVERITY QUANTIFIED, NO PAIN PRESENT: ICD-10-PCS | Mod: CPTII,S$GLB,, | Performed by: DERMATOLOGY

## 2021-09-15 PROCEDURE — 3288F FALL RISK ASSESSMENT DOCD: CPT | Mod: CPTII,S$GLB,, | Performed by: DERMATOLOGY

## 2021-09-15 PROCEDURE — 99213 OFFICE O/P EST LOW 20 MIN: CPT | Mod: S$GLB,,, | Performed by: DERMATOLOGY

## 2021-09-15 PROCEDURE — 1159F PR MEDICATION LIST DOCUMENTED IN MEDICAL RECORD: ICD-10-PCS | Mod: CPTII,S$GLB,, | Performed by: DERMATOLOGY

## 2021-09-15 PROCEDURE — 99213 PR OFFICE/OUTPT VISIT, EST, LEVL III, 20-29 MIN: ICD-10-PCS | Mod: S$GLB,,, | Performed by: DERMATOLOGY

## 2021-09-15 PROCEDURE — 1101F PR PT FALLS ASSESS DOC 0-1 FALLS W/OUT INJ PAST YR: ICD-10-PCS | Mod: CPTII,S$GLB,, | Performed by: DERMATOLOGY

## 2021-09-15 PROCEDURE — 1159F MED LIST DOCD IN RCRD: CPT | Mod: CPTII,S$GLB,, | Performed by: DERMATOLOGY

## 2021-09-15 RX ORDER — FLUOCINOLONE ACETONIDE 0.11 MG/ML
OIL AURICULAR (OTIC)
Qty: 20 ML | Refills: 1 | Status: SHIPPED | OUTPATIENT
Start: 2021-09-15 | End: 2022-03-11

## 2021-09-23 ENCOUNTER — OFFICE VISIT (OUTPATIENT)
Dept: CARDIOLOGY | Facility: CLINIC | Age: 86
End: 2021-09-23
Payer: MEDICARE

## 2021-09-23 VITALS
HEIGHT: 64 IN | DIASTOLIC BLOOD PRESSURE: 60 MMHG | HEART RATE: 84 BPM | WEIGHT: 188.69 LBS | SYSTOLIC BLOOD PRESSURE: 130 MMHG | OXYGEN SATURATION: 93 % | BODY MASS INDEX: 32.21 KG/M2

## 2021-09-23 DIAGNOSIS — I49.3 PVC'S (PREMATURE VENTRICULAR CONTRACTIONS): ICD-10-CM

## 2021-09-23 DIAGNOSIS — I73.9 PAD (PERIPHERAL ARTERY DISEASE): ICD-10-CM

## 2021-09-23 DIAGNOSIS — Z78.9 STATIN INTOLERANCE: ICD-10-CM

## 2021-09-23 DIAGNOSIS — Z79.01 CHRONIC ANTICOAGULATION: ICD-10-CM

## 2021-09-23 DIAGNOSIS — I48.0 PAF (PAROXYSMAL ATRIAL FIBRILLATION): ICD-10-CM

## 2021-09-23 DIAGNOSIS — I12.9 CKD STAGE 4 SECONDARY TO HYPERTENSION: ICD-10-CM

## 2021-09-23 DIAGNOSIS — N19 HYPERTENSIVE HEART AND RENAL DISEASE WITH BOTH (CONGESTIVE) HEART FAILURE AND RENAL FAILURE: Primary | ICD-10-CM

## 2021-09-23 DIAGNOSIS — N18.4 CKD STAGE 4 SECONDARY TO HYPERTENSION: ICD-10-CM

## 2021-09-23 DIAGNOSIS — I11.9 HYPERTENSIVE LEFT VENTRICULAR HYPERTROPHY, WITHOUT HEART FAILURE: Chronic | ICD-10-CM

## 2021-09-23 DIAGNOSIS — I10 ESSENTIAL HYPERTENSION: Chronic | ICD-10-CM

## 2021-09-23 DIAGNOSIS — I13.2 HYPERTENSIVE HEART AND RENAL DISEASE WITH BOTH (CONGESTIVE) HEART FAILURE AND RENAL FAILURE: Primary | ICD-10-CM

## 2021-09-23 DIAGNOSIS — E66.9 OBESITY (BMI 30.0-34.9): ICD-10-CM

## 2021-09-23 PROCEDURE — 1159F PR MEDICATION LIST DOCUMENTED IN MEDICAL RECORD: ICD-10-PCS | Mod: CPTII,S$GLB,, | Performed by: INTERNAL MEDICINE

## 2021-09-23 PROCEDURE — 1159F MED LIST DOCD IN RCRD: CPT | Mod: CPTII,S$GLB,, | Performed by: INTERNAL MEDICINE

## 2021-09-23 PROCEDURE — 99999 PR PBB SHADOW E&M-EST. PATIENT-LVL IV: ICD-10-PCS | Mod: PBBFAC,,, | Performed by: INTERNAL MEDICINE

## 2021-09-23 PROCEDURE — 99214 OFFICE O/P EST MOD 30 MIN: CPT | Mod: S$GLB,,, | Performed by: INTERNAL MEDICINE

## 2021-09-23 PROCEDURE — 99214 PR OFFICE/OUTPT VISIT, EST, LEVL IV, 30-39 MIN: ICD-10-PCS | Mod: S$GLB,,, | Performed by: INTERNAL MEDICINE

## 2021-09-23 PROCEDURE — 99999 PR PBB SHADOW E&M-EST. PATIENT-LVL IV: CPT | Mod: PBBFAC,,, | Performed by: INTERNAL MEDICINE

## 2021-09-27 ENCOUNTER — OFFICE VISIT (OUTPATIENT)
Dept: PRIMARY CARE CLINIC | Facility: CLINIC | Age: 86
End: 2021-09-27
Payer: MEDICARE

## 2021-09-27 VITALS
TEMPERATURE: 98 F | DIASTOLIC BLOOD PRESSURE: 64 MMHG | BODY MASS INDEX: 32.37 KG/M2 | OXYGEN SATURATION: 95 % | SYSTOLIC BLOOD PRESSURE: 132 MMHG | HEIGHT: 64 IN | WEIGHT: 189.63 LBS | HEART RATE: 70 BPM

## 2021-09-27 DIAGNOSIS — I48.11 LONGSTANDING PERSISTENT ATRIAL FIBRILLATION: ICD-10-CM

## 2021-09-27 DIAGNOSIS — Z79.01 CHRONIC ANTICOAGULATION: ICD-10-CM

## 2021-09-27 DIAGNOSIS — N19 HYPERTENSIVE HEART AND RENAL DISEASE WITH BOTH (CONGESTIVE) HEART FAILURE AND RENAL FAILURE: ICD-10-CM

## 2021-09-27 DIAGNOSIS — K62.9 RECTAL ABNORMALITY: ICD-10-CM

## 2021-09-27 DIAGNOSIS — I13.2 HYPERTENSIVE HEART AND RENAL DISEASE WITH BOTH (CONGESTIVE) HEART FAILURE AND RENAL FAILURE: ICD-10-CM

## 2021-09-27 PROCEDURE — 1160F RVW MEDS BY RX/DR IN RCRD: CPT | Mod: CPTII,S$GLB,, | Performed by: NURSE PRACTITIONER

## 2021-09-27 PROCEDURE — 3288F FALL RISK ASSESSMENT DOCD: CPT | Mod: CPTII,S$GLB,, | Performed by: NURSE PRACTITIONER

## 2021-09-27 PROCEDURE — 99214 OFFICE O/P EST MOD 30 MIN: CPT | Mod: S$GLB,,, | Performed by: NURSE PRACTITIONER

## 2021-09-27 PROCEDURE — 1101F PR PT FALLS ASSESS DOC 0-1 FALLS W/OUT INJ PAST YR: ICD-10-PCS | Mod: CPTII,S$GLB,, | Performed by: NURSE PRACTITIONER

## 2021-09-27 PROCEDURE — 99214 PR OFFICE/OUTPT VISIT, EST, LEVL IV, 30-39 MIN: ICD-10-PCS | Mod: S$GLB,,, | Performed by: NURSE PRACTITIONER

## 2021-09-27 PROCEDURE — 1159F MED LIST DOCD IN RCRD: CPT | Mod: CPTII,S$GLB,, | Performed by: NURSE PRACTITIONER

## 2021-09-27 PROCEDURE — 1126F PR PAIN SEVERITY QUANTIFIED, NO PAIN PRESENT: ICD-10-PCS | Mod: CPTII,S$GLB,, | Performed by: NURSE PRACTITIONER

## 2021-09-27 PROCEDURE — 3288F PR FALLS RISK ASSESSMENT DOCUMENTED: ICD-10-PCS | Mod: CPTII,S$GLB,, | Performed by: NURSE PRACTITIONER

## 2021-09-27 PROCEDURE — 1126F AMNT PAIN NOTED NONE PRSNT: CPT | Mod: CPTII,S$GLB,, | Performed by: NURSE PRACTITIONER

## 2021-09-27 PROCEDURE — 99999 PR PBB SHADOW E&M-EST. PATIENT-LVL IV: CPT | Mod: PBBFAC,,, | Performed by: NURSE PRACTITIONER

## 2021-09-27 PROCEDURE — 99999 PR PBB SHADOW E&M-EST. PATIENT-LVL IV: ICD-10-PCS | Mod: PBBFAC,,, | Performed by: NURSE PRACTITIONER

## 2021-09-27 PROCEDURE — 1159F PR MEDICATION LIST DOCUMENTED IN MEDICAL RECORD: ICD-10-PCS | Mod: CPTII,S$GLB,, | Performed by: NURSE PRACTITIONER

## 2021-09-27 PROCEDURE — 1160F PR REVIEW ALL MEDS BY PRESCRIBER/CLIN PHARMACIST DOCUMENTED: ICD-10-PCS | Mod: CPTII,S$GLB,, | Performed by: NURSE PRACTITIONER

## 2021-09-27 PROCEDURE — 1101F PT FALLS ASSESS-DOCD LE1/YR: CPT | Mod: CPTII,S$GLB,, | Performed by: NURSE PRACTITIONER

## 2021-09-29 ENCOUNTER — IMMUNIZATION (OUTPATIENT)
Dept: PRIMARY CARE CLINIC | Facility: CLINIC | Age: 86
End: 2021-09-29
Payer: MEDICARE

## 2021-09-29 DIAGNOSIS — Z23 NEED FOR VACCINATION: Primary | ICD-10-CM

## 2021-09-29 PROCEDURE — 91300 COVID-19, MRNA, LNP-S, PF, 30 MCG/0.3 ML DOSE VACCINE: CPT | Mod: PBBFAC | Performed by: FAMILY MEDICINE

## 2021-09-29 PROCEDURE — 0003A COVID-19, MRNA, LNP-S, PF, 30 MCG/0.3 ML DOSE VACCINE: CPT | Mod: CV19,PBBFAC | Performed by: FAMILY MEDICINE

## 2021-10-05 ENCOUNTER — OFFICE VISIT (OUTPATIENT)
Dept: INTERNAL MEDICINE | Facility: CLINIC | Age: 86
End: 2021-10-05
Payer: MEDICARE

## 2021-10-05 VITALS
WEIGHT: 191.38 LBS | DIASTOLIC BLOOD PRESSURE: 76 MMHG | SYSTOLIC BLOOD PRESSURE: 132 MMHG | HEIGHT: 64 IN | BODY MASS INDEX: 32.67 KG/M2 | OXYGEN SATURATION: 97 % | HEART RATE: 72 BPM

## 2021-10-05 DIAGNOSIS — R73.03 PREDIABETES: ICD-10-CM

## 2021-10-05 DIAGNOSIS — M35.01 KERATITIS SICCA, BILATERAL: ICD-10-CM

## 2021-10-05 DIAGNOSIS — N18.4 CKD (CHRONIC KIDNEY DISEASE) STAGE 4, GFR 15-29 ML/MIN: ICD-10-CM

## 2021-10-05 DIAGNOSIS — E66.9 OBESITY (BMI 30.0-34.9): ICD-10-CM

## 2021-10-05 DIAGNOSIS — I65.29 STENOSIS OF CAROTID ARTERY, UNSPECIFIED LATERALITY: ICD-10-CM

## 2021-10-05 DIAGNOSIS — E78.5 HYPERLIPIDEMIA, UNSPECIFIED HYPERLIPIDEMIA TYPE: ICD-10-CM

## 2021-10-05 DIAGNOSIS — Z85.828 HISTORY OF SKIN CANCER: ICD-10-CM

## 2021-10-05 DIAGNOSIS — I11.0 HYPERTENSIVE HEART DISEASE WITH HEART FAILURE: ICD-10-CM

## 2021-10-05 DIAGNOSIS — I70.0 CALCIFICATION OF AORTA: ICD-10-CM

## 2021-10-05 DIAGNOSIS — I10 HYPERTENSION, UNSPECIFIED TYPE: ICD-10-CM

## 2021-10-05 DIAGNOSIS — H35.30 MACULAR DEGENERATION, UNSPECIFIED LATERALITY, UNSPECIFIED TYPE: ICD-10-CM

## 2021-10-05 DIAGNOSIS — F41.9 ANXIETY: ICD-10-CM

## 2021-10-05 DIAGNOSIS — I25.84 CORONARY ARTERY CALCIFICATION: ICD-10-CM

## 2021-10-05 DIAGNOSIS — I48.91 ATRIAL FIBRILLATION, UNSPECIFIED TYPE: ICD-10-CM

## 2021-10-05 DIAGNOSIS — H40.1131 PRIMARY OPEN ANGLE GLAUCOMA (POAG) OF BOTH EYES, MILD STAGE: ICD-10-CM

## 2021-10-05 DIAGNOSIS — I73.9 PAD (PERIPHERAL ARTERY DISEASE): ICD-10-CM

## 2021-10-05 DIAGNOSIS — I25.10 CORONARY ARTERY CALCIFICATION: ICD-10-CM

## 2021-10-05 DIAGNOSIS — Z99.89 DEPENDENCE ON OTHER ENABLING MACHINES AND DEVICES: ICD-10-CM

## 2021-10-05 DIAGNOSIS — M85.80 OSTEOPENIA, UNSPECIFIED LOCATION: ICD-10-CM

## 2021-10-05 DIAGNOSIS — Z00.00 ENCOUNTER FOR PREVENTIVE HEALTH EXAMINATION: Primary | ICD-10-CM

## 2021-10-05 DIAGNOSIS — R91.8 LUNG NODULES: ICD-10-CM

## 2021-10-05 PROCEDURE — 99999 PR PBB SHADOW E&M-EST. PATIENT-LVL V: CPT | Mod: PBBFAC,,, | Performed by: NURSE PRACTITIONER

## 2021-10-05 PROCEDURE — 1126F AMNT PAIN NOTED NONE PRSNT: CPT | Mod: CPTII,S$GLB,, | Performed by: NURSE PRACTITIONER

## 2021-10-05 PROCEDURE — 1101F PT FALLS ASSESS-DOCD LE1/YR: CPT | Mod: CPTII,S$GLB,, | Performed by: NURSE PRACTITIONER

## 2021-10-05 PROCEDURE — 1126F PR PAIN SEVERITY QUANTIFIED, NO PAIN PRESENT: ICD-10-PCS | Mod: CPTII,S$GLB,, | Performed by: NURSE PRACTITIONER

## 2021-10-05 PROCEDURE — 3288F PR FALLS RISK ASSESSMENT DOCUMENTED: ICD-10-PCS | Mod: CPTII,S$GLB,, | Performed by: NURSE PRACTITIONER

## 2021-10-05 PROCEDURE — 3288F FALL RISK ASSESSMENT DOCD: CPT | Mod: CPTII,S$GLB,, | Performed by: NURSE PRACTITIONER

## 2021-10-05 PROCEDURE — G0439 PPPS, SUBSEQ VISIT: HCPCS | Mod: S$GLB,,, | Performed by: NURSE PRACTITIONER

## 2021-10-05 PROCEDURE — 1159F MED LIST DOCD IN RCRD: CPT | Mod: CPTII,S$GLB,, | Performed by: NURSE PRACTITIONER

## 2021-10-05 PROCEDURE — 1160F RVW MEDS BY RX/DR IN RCRD: CPT | Mod: CPTII,S$GLB,, | Performed by: NURSE PRACTITIONER

## 2021-10-05 PROCEDURE — 1101F PR PT FALLS ASSESS DOC 0-1 FALLS W/OUT INJ PAST YR: ICD-10-PCS | Mod: CPTII,S$GLB,, | Performed by: NURSE PRACTITIONER

## 2021-10-05 PROCEDURE — G0439 PR MEDICARE ANNUAL WELLNESS SUBSEQUENT VISIT: ICD-10-PCS | Mod: S$GLB,,, | Performed by: NURSE PRACTITIONER

## 2021-10-05 PROCEDURE — 99999 PR PBB SHADOW E&M-EST. PATIENT-LVL V: ICD-10-PCS | Mod: PBBFAC,,, | Performed by: NURSE PRACTITIONER

## 2021-10-05 PROCEDURE — 1160F PR REVIEW ALL MEDS BY PRESCRIBER/CLIN PHARMACIST DOCUMENTED: ICD-10-PCS | Mod: CPTII,S$GLB,, | Performed by: NURSE PRACTITIONER

## 2021-10-05 PROCEDURE — 1159F PR MEDICATION LIST DOCUMENTED IN MEDICAL RECORD: ICD-10-PCS | Mod: CPTII,S$GLB,, | Performed by: NURSE PRACTITIONER

## 2021-10-06 ENCOUNTER — TELEPHONE (OUTPATIENT)
Dept: OPHTHALMOLOGY | Facility: CLINIC | Age: 86
End: 2021-10-06

## 2021-11-03 ENCOUNTER — OUTSIDE PLACE OF SERVICE (OUTPATIENT)
Dept: OPHTHALMOLOGY | Facility: CLINIC | Age: 86
End: 2021-11-03
Payer: COMMERCIAL

## 2021-11-03 PROCEDURE — 65855 TRABECULOPLASTY LASER SURG: CPT | Mod: LT,,, | Performed by: OPHTHALMOLOGY

## 2021-11-03 PROCEDURE — 65855 PR TRABECULOPLASTY LASER SURGERY: ICD-10-PCS | Mod: LT,,, | Performed by: OPHTHALMOLOGY

## 2021-11-08 ENCOUNTER — LAB VISIT (OUTPATIENT)
Dept: LAB | Facility: HOSPITAL | Age: 86
End: 2021-11-08
Attending: NURSE PRACTITIONER
Payer: MEDICARE

## 2021-11-08 DIAGNOSIS — I13.2 HYPERTENSIVE HEART AND RENAL DISEASE WITH BOTH (CONGESTIVE) HEART FAILURE AND RENAL FAILURE: ICD-10-CM

## 2021-11-08 DIAGNOSIS — N19 HYPERTENSIVE HEART AND RENAL DISEASE WITH BOTH (CONGESTIVE) HEART FAILURE AND RENAL FAILURE: ICD-10-CM

## 2021-11-08 DIAGNOSIS — E53.8 B12 DEFICIENCY: Primary | ICD-10-CM

## 2021-11-08 LAB
ALBUMIN SERPL BCP-MCNC: 3.7 G/DL (ref 3.5–5.2)
ANION GAP SERPL CALC-SCNC: 10 MMOL/L (ref 8–16)
BASOPHILS # BLD AUTO: 0.04 K/UL (ref 0–0.2)
BASOPHILS NFR BLD: 0.6 % (ref 0–1.9)
BUN SERPL-MCNC: 26 MG/DL (ref 8–23)
CALCIUM SERPL-MCNC: 9.7 MG/DL (ref 8.7–10.5)
CHLORIDE SERPL-SCNC: 106 MMOL/L (ref 95–110)
CO2 SERPL-SCNC: 26 MMOL/L (ref 23–29)
CREAT SERPL-MCNC: 1.6 MG/DL (ref 0.5–1.4)
DIFFERENTIAL METHOD: ABNORMAL
EOSINOPHIL # BLD AUTO: 0.2 K/UL (ref 0–0.5)
EOSINOPHIL NFR BLD: 2.9 % (ref 0–8)
ERYTHROCYTE [DISTWIDTH] IN BLOOD BY AUTOMATED COUNT: 13.1 % (ref 11.5–14.5)
EST. GFR  (AFRICAN AMERICAN): 32.9 ML/MIN/1.73 M^2
EST. GFR  (NON AFRICAN AMERICAN): 28.6 ML/MIN/1.73 M^2
GLUCOSE SERPL-MCNC: 134 MG/DL (ref 70–110)
HCT VFR BLD AUTO: 39.2 % (ref 37–48.5)
HGB BLD-MCNC: 12.7 G/DL (ref 12–16)
IMM GRANULOCYTES # BLD AUTO: 0.02 K/UL (ref 0–0.04)
IMM GRANULOCYTES NFR BLD AUTO: 0.3 % (ref 0–0.5)
LYMPHOCYTES # BLD AUTO: 1.6 K/UL (ref 1–4.8)
LYMPHOCYTES NFR BLD: 23.8 % (ref 18–48)
MCH RBC QN AUTO: 33.3 PG (ref 27–31)
MCHC RBC AUTO-ENTMCNC: 32.4 G/DL (ref 32–36)
MCV RBC AUTO: 103 FL (ref 82–98)
MONOCYTES # BLD AUTO: 0.6 K/UL (ref 0.3–1)
MONOCYTES NFR BLD: 8.7 % (ref 4–15)
NEUTROPHILS # BLD AUTO: 4.2 K/UL (ref 1.8–7.7)
NEUTROPHILS NFR BLD: 63.7 % (ref 38–73)
NRBC BLD-RTO: 0 /100 WBC
PHOSPHATE SERPL-MCNC: 3.3 MG/DL (ref 2.7–4.5)
PLATELET # BLD AUTO: 236 K/UL (ref 150–450)
PMV BLD AUTO: 10.7 FL (ref 9.2–12.9)
POTASSIUM SERPL-SCNC: 4 MMOL/L (ref 3.5–5.1)
RBC # BLD AUTO: 3.81 M/UL (ref 4–5.4)
SODIUM SERPL-SCNC: 142 MMOL/L (ref 136–145)
WBC # BLD AUTO: 6.64 K/UL (ref 3.9–12.7)

## 2021-11-08 PROCEDURE — 85025 COMPLETE CBC W/AUTO DIFF WBC: CPT | Mod: HCNC | Performed by: NURSE PRACTITIONER

## 2021-11-08 PROCEDURE — 36415 COLL VENOUS BLD VENIPUNCTURE: CPT | Mod: HCNC | Performed by: NURSE PRACTITIONER

## 2021-11-08 PROCEDURE — 80069 RENAL FUNCTION PANEL: CPT | Mod: HCNC | Performed by: NURSE PRACTITIONER

## 2021-11-29 DIAGNOSIS — I13.2 HYPERTENSIVE HEART AND RENAL DISEASE WITH BOTH (CONGESTIVE) HEART FAILURE AND RENAL FAILURE: ICD-10-CM

## 2021-11-29 DIAGNOSIS — I25.84 CORONARY ARTERY CALCIFICATION: ICD-10-CM

## 2021-11-29 DIAGNOSIS — I51.3 THROMBUS OF LEFT ATRIAL APPENDAGE: ICD-10-CM

## 2021-11-29 DIAGNOSIS — I48.11 LONGSTANDING PERSISTENT ATRIAL FIBRILLATION: ICD-10-CM

## 2021-11-29 DIAGNOSIS — I49.3 PVC'S (PREMATURE VENTRICULAR CONTRACTIONS): ICD-10-CM

## 2021-11-29 DIAGNOSIS — N19 HYPERTENSIVE HEART AND RENAL DISEASE WITH BOTH (CONGESTIVE) HEART FAILURE AND RENAL FAILURE: ICD-10-CM

## 2021-11-29 DIAGNOSIS — I10 PRIMARY HYPERTENSION: Primary | ICD-10-CM

## 2021-11-29 DIAGNOSIS — E66.9 OBESITY (BMI 30.0-34.9): ICD-10-CM

## 2021-11-29 DIAGNOSIS — R94.31 ABNORMAL ECG: ICD-10-CM

## 2021-11-29 DIAGNOSIS — I12.9 CKD STAGE 4 SECONDARY TO HYPERTENSION: ICD-10-CM

## 2021-11-29 DIAGNOSIS — I70.0 CALCIFICATION OF AORTA: ICD-10-CM

## 2021-11-29 DIAGNOSIS — Z79.01 CHRONIC ANTICOAGULATION: ICD-10-CM

## 2021-11-29 DIAGNOSIS — I65.29 STENOSIS OF CAROTID ARTERY, UNSPECIFIED LATERALITY: ICD-10-CM

## 2021-11-29 DIAGNOSIS — T88.7XXA MEDICATION SIDE EFFECTS: ICD-10-CM

## 2021-11-29 DIAGNOSIS — I73.9 PAD (PERIPHERAL ARTERY DISEASE): ICD-10-CM

## 2021-11-29 DIAGNOSIS — Z78.9 STATIN INTOLERANCE: ICD-10-CM

## 2021-11-29 DIAGNOSIS — E78.2 MIXED HYPERLIPIDEMIA: ICD-10-CM

## 2021-11-29 DIAGNOSIS — N18.4 CKD STAGE 4 SECONDARY TO HYPERTENSION: ICD-10-CM

## 2021-11-29 DIAGNOSIS — R09.89 UNEQUAL BLOOD PRESSURES IN PAIRED EXTREMITIES: ICD-10-CM

## 2021-11-29 DIAGNOSIS — I11.9 HYPERTENSIVE LEFT VENTRICULAR HYPERTROPHY, WITHOUT HEART FAILURE: ICD-10-CM

## 2021-11-29 DIAGNOSIS — I25.10 CORONARY ARTERY CALCIFICATION: ICD-10-CM

## 2021-11-30 ENCOUNTER — OFFICE VISIT (OUTPATIENT)
Dept: CARDIOLOGY | Facility: CLINIC | Age: 86
End: 2021-11-30
Payer: MEDICARE

## 2021-11-30 ENCOUNTER — HOSPITAL ENCOUNTER (OUTPATIENT)
Dept: CARDIOLOGY | Facility: HOSPITAL | Age: 86
Discharge: HOME | End: 2021-11-30
Attending: INTERNAL MEDICINE
Payer: MEDICARE

## 2021-11-30 VITALS
DIASTOLIC BLOOD PRESSURE: 76 MMHG | OXYGEN SATURATION: 96 % | SYSTOLIC BLOOD PRESSURE: 122 MMHG | HEART RATE: 73 BPM | BODY MASS INDEX: 32.56 KG/M2 | WEIGHT: 190.69 LBS | HEIGHT: 64 IN

## 2021-11-30 DIAGNOSIS — Z79.01 CHRONIC ANTICOAGULATION: ICD-10-CM

## 2021-11-30 DIAGNOSIS — Z78.9 STATIN INTOLERANCE: ICD-10-CM

## 2021-11-30 DIAGNOSIS — I49.3 PVC'S (PREMATURE VENTRICULAR CONTRACTIONS): ICD-10-CM

## 2021-11-30 DIAGNOSIS — I11.9 HYPERTENSIVE LEFT VENTRICULAR HYPERTROPHY, WITHOUT HEART FAILURE: ICD-10-CM

## 2021-11-30 DIAGNOSIS — I10 PRIMARY HYPERTENSION: Primary | ICD-10-CM

## 2021-11-30 DIAGNOSIS — I51.3 THROMBUS OF LEFT ATRIAL APPENDAGE: ICD-10-CM

## 2021-11-30 DIAGNOSIS — R09.89 UNEQUAL BLOOD PRESSURES IN PAIRED EXTREMITIES: ICD-10-CM

## 2021-11-30 DIAGNOSIS — I73.9 PAD (PERIPHERAL ARTERY DISEASE): ICD-10-CM

## 2021-11-30 DIAGNOSIS — N19 HYPERTENSIVE HEART AND RENAL DISEASE WITH BOTH (CONGESTIVE) HEART FAILURE AND RENAL FAILURE: ICD-10-CM

## 2021-11-30 DIAGNOSIS — N18.4 CKD STAGE 4 SECONDARY TO HYPERTENSION: ICD-10-CM

## 2021-11-30 DIAGNOSIS — E66.9 OBESITY (BMI 30.0-34.9): ICD-10-CM

## 2021-11-30 DIAGNOSIS — I10 PRIMARY HYPERTENSION: ICD-10-CM

## 2021-11-30 DIAGNOSIS — I70.0 CALCIFICATION OF AORTA: ICD-10-CM

## 2021-11-30 DIAGNOSIS — I65.29 STENOSIS OF CAROTID ARTERY, UNSPECIFIED LATERALITY: ICD-10-CM

## 2021-11-30 DIAGNOSIS — I48.11 LONGSTANDING PERSISTENT ATRIAL FIBRILLATION: ICD-10-CM

## 2021-11-30 DIAGNOSIS — I13.2 HYPERTENSIVE HEART AND RENAL DISEASE WITH BOTH (CONGESTIVE) HEART FAILURE AND RENAL FAILURE: ICD-10-CM

## 2021-11-30 DIAGNOSIS — I25.10 CORONARY ARTERY CALCIFICATION: ICD-10-CM

## 2021-11-30 DIAGNOSIS — I25.84 CORONARY ARTERY CALCIFICATION: ICD-10-CM

## 2021-11-30 DIAGNOSIS — E78.2 MIXED HYPERLIPIDEMIA: ICD-10-CM

## 2021-11-30 DIAGNOSIS — T88.7XXA MEDICATION SIDE EFFECTS: ICD-10-CM

## 2021-11-30 DIAGNOSIS — R94.31 ABNORMAL ECG: ICD-10-CM

## 2021-11-30 DIAGNOSIS — I12.9 CKD STAGE 4 SECONDARY TO HYPERTENSION: ICD-10-CM

## 2021-11-30 PROCEDURE — 93010 ELECTROCARDIOGRAM REPORT: CPT | Mod: HCNC,,, | Performed by: INTERNAL MEDICINE

## 2021-11-30 PROCEDURE — 93005 ELECTROCARDIOGRAM TRACING: CPT | Mod: HCNC

## 2021-11-30 PROCEDURE — 99214 OFFICE O/P EST MOD 30 MIN: CPT | Mod: HCNC,S$GLB,, | Performed by: INTERNAL MEDICINE

## 2021-11-30 PROCEDURE — 99999 PR PBB SHADOW E&M-EST. PATIENT-LVL IV: ICD-10-PCS | Mod: PBBFAC,HCNC,, | Performed by: INTERNAL MEDICINE

## 2021-11-30 PROCEDURE — 93010 EKG 12-LEAD: ICD-10-PCS | Mod: HCNC,,, | Performed by: INTERNAL MEDICINE

## 2021-11-30 PROCEDURE — 99999 PR PBB SHADOW E&M-EST. PATIENT-LVL IV: CPT | Mod: PBBFAC,HCNC,, | Performed by: INTERNAL MEDICINE

## 2021-11-30 PROCEDURE — 99214 PR OFFICE/OUTPT VISIT, EST, LEVL IV, 30-39 MIN: ICD-10-PCS | Mod: HCNC,S$GLB,, | Performed by: INTERNAL MEDICINE

## 2021-11-30 RX ORDER — ISOSORBIDE MONONITRATE 30 MG/1
30 TABLET, EXTENDED RELEASE ORAL NIGHTLY
Qty: 30 TABLET | Refills: 3 | Status: SHIPPED | OUTPATIENT
Start: 2021-11-30 | End: 2022-06-15 | Stop reason: SDUPTHER

## 2021-11-30 RX ORDER — LOSARTAN POTASSIUM 25 MG/1
25 TABLET ORAL 2 TIMES DAILY
Qty: 180 TABLET | Refills: 1 | Status: SHIPPED | OUTPATIENT
Start: 2021-11-30 | End: 2022-01-13 | Stop reason: SDUPTHER

## 2021-12-01 ENCOUNTER — PATIENT OUTREACH (OUTPATIENT)
Dept: ADMINISTRATIVE | Facility: OTHER | Age: 86
End: 2021-12-01
Payer: MEDICARE

## 2021-12-06 ENCOUNTER — OFFICE VISIT (OUTPATIENT)
Dept: OPHTHALMOLOGY | Facility: CLINIC | Age: 86
End: 2021-12-06
Payer: MEDICARE

## 2021-12-06 DIAGNOSIS — M35.01 KERATITIS SICCA, BILATERAL: ICD-10-CM

## 2021-12-06 DIAGNOSIS — Z94.7 HISTORY OF CORNEA TRANSPLANT: ICD-10-CM

## 2021-12-06 DIAGNOSIS — Z96.1 PSEUDOPHAKIA: ICD-10-CM

## 2021-12-06 DIAGNOSIS — H40.1131 PRIMARY OPEN ANGLE GLAUCOMA (POAG) OF BOTH EYES, MILD STAGE: Primary | ICD-10-CM

## 2021-12-06 PROCEDURE — 99214 PR OFFICE/OUTPT VISIT, EST, LEVL IV, 30-39 MIN: ICD-10-PCS | Mod: HCNC,S$GLB,, | Performed by: OPHTHALMOLOGY

## 2021-12-06 PROCEDURE — 99214 OFFICE O/P EST MOD 30 MIN: CPT | Mod: HCNC,S$GLB,, | Performed by: OPHTHALMOLOGY

## 2021-12-06 PROCEDURE — 99999 PR PBB SHADOW E&M-EST. PATIENT-LVL I: CPT | Mod: PBBFAC,HCNC,, | Performed by: OPHTHALMOLOGY

## 2021-12-06 PROCEDURE — 99999 PR PBB SHADOW E&M-EST. PATIENT-LVL I: ICD-10-PCS | Mod: PBBFAC,HCNC,, | Performed by: OPHTHALMOLOGY

## 2021-12-10 ENCOUNTER — TELEPHONE (OUTPATIENT)
Dept: OBSTETRICS AND GYNECOLOGY | Facility: CLINIC | Age: 86
End: 2021-12-10
Payer: MEDICARE

## 2021-12-15 ENCOUNTER — OFFICE VISIT (OUTPATIENT)
Dept: OBSTETRICS AND GYNECOLOGY | Facility: CLINIC | Age: 86
End: 2021-12-15
Payer: MEDICARE

## 2021-12-15 VITALS — BODY MASS INDEX: 33 KG/M2 | WEIGHT: 192.25 LBS | SYSTOLIC BLOOD PRESSURE: 160 MMHG | DIASTOLIC BLOOD PRESSURE: 64 MMHG

## 2021-12-15 DIAGNOSIS — Z01.419 WELL WOMAN EXAM WITH ROUTINE GYNECOLOGICAL EXAM: Primary | ICD-10-CM

## 2021-12-15 PROCEDURE — 99999 PR PBB SHADOW E&M-EST. PATIENT-LVL III: ICD-10-PCS | Mod: PBBFAC,HCNC,, | Performed by: NURSE PRACTITIONER

## 2021-12-15 PROCEDURE — 99999 PR PBB SHADOW E&M-EST. PATIENT-LVL III: CPT | Mod: PBBFAC,HCNC,, | Performed by: NURSE PRACTITIONER

## 2021-12-15 PROCEDURE — G0101 CA SCREEN;PELVIC/BREAST EXAM: HCPCS | Mod: HCNC,S$GLB,, | Performed by: NURSE PRACTITIONER

## 2021-12-15 PROCEDURE — G0101 PR CA SCREEN;PELVIC/BREAST EXAM: ICD-10-PCS | Mod: HCNC,S$GLB,, | Performed by: NURSE PRACTITIONER

## 2022-01-13 ENCOUNTER — OFFICE VISIT (OUTPATIENT)
Dept: CARDIOLOGY | Facility: CLINIC | Age: 87
End: 2022-01-13
Payer: MEDICARE

## 2022-01-13 VITALS
OXYGEN SATURATION: 94 % | DIASTOLIC BLOOD PRESSURE: 84 MMHG | HEART RATE: 75 BPM | BODY MASS INDEX: 32.81 KG/M2 | WEIGHT: 191.13 LBS | SYSTOLIC BLOOD PRESSURE: 144 MMHG

## 2022-01-13 DIAGNOSIS — I65.23 CAROTID ARTERY CALCIFICATION, BILATERAL: ICD-10-CM

## 2022-01-13 DIAGNOSIS — I70.0 CALCIFICATION OF AORTA: ICD-10-CM

## 2022-01-13 DIAGNOSIS — I48.0 PAF (PAROXYSMAL ATRIAL FIBRILLATION): ICD-10-CM

## 2022-01-13 DIAGNOSIS — I10 PRIMARY HYPERTENSION: Primary | ICD-10-CM

## 2022-01-13 DIAGNOSIS — I65.29 STENOSIS OF CAROTID ARTERY, UNSPECIFIED LATERALITY: ICD-10-CM

## 2022-01-13 DIAGNOSIS — I73.9 PAD (PERIPHERAL ARTERY DISEASE): ICD-10-CM

## 2022-01-13 DIAGNOSIS — I49.3 PVC'S (PREMATURE VENTRICULAR CONTRACTIONS): ICD-10-CM

## 2022-01-13 DIAGNOSIS — E78.2 MIXED HYPERLIPIDEMIA: ICD-10-CM

## 2022-01-13 DIAGNOSIS — I11.9 HYPERTENSIVE LEFT VENTRICULAR HYPERTROPHY, WITHOUT HEART FAILURE: ICD-10-CM

## 2022-01-13 PROCEDURE — 1159F MED LIST DOCD IN RCRD: CPT | Mod: HCNC,CPTII,S$GLB, | Performed by: INTERNAL MEDICINE

## 2022-01-13 PROCEDURE — 1126F PR PAIN SEVERITY QUANTIFIED, NO PAIN PRESENT: ICD-10-PCS | Mod: HCNC,CPTII,S$GLB, | Performed by: INTERNAL MEDICINE

## 2022-01-13 PROCEDURE — 1126F AMNT PAIN NOTED NONE PRSNT: CPT | Mod: HCNC,CPTII,S$GLB, | Performed by: INTERNAL MEDICINE

## 2022-01-13 PROCEDURE — 99214 OFFICE O/P EST MOD 30 MIN: CPT | Mod: HCNC,S$GLB,, | Performed by: INTERNAL MEDICINE

## 2022-01-13 PROCEDURE — 1160F RVW MEDS BY RX/DR IN RCRD: CPT | Mod: HCNC,CPTII,S$GLB, | Performed by: INTERNAL MEDICINE

## 2022-01-13 PROCEDURE — 99999 PR PBB SHADOW E&M-EST. PATIENT-LVL III: ICD-10-PCS | Mod: PBBFAC,HCNC,, | Performed by: INTERNAL MEDICINE

## 2022-01-13 PROCEDURE — 99999 PR PBB SHADOW E&M-EST. PATIENT-LVL III: CPT | Mod: PBBFAC,HCNC,, | Performed by: INTERNAL MEDICINE

## 2022-01-13 PROCEDURE — 99499 UNLISTED E&M SERVICE: CPT | Mod: S$GLB,,, | Performed by: INTERNAL MEDICINE

## 2022-01-13 PROCEDURE — 1160F PR REVIEW ALL MEDS BY PRESCRIBER/CLIN PHARMACIST DOCUMENTED: ICD-10-PCS | Mod: HCNC,CPTII,S$GLB, | Performed by: INTERNAL MEDICINE

## 2022-01-13 PROCEDURE — 99214 PR OFFICE/OUTPT VISIT, EST, LEVL IV, 30-39 MIN: ICD-10-PCS | Mod: HCNC,S$GLB,, | Performed by: INTERNAL MEDICINE

## 2022-01-13 PROCEDURE — 99499 RISK ADDL DX/OHS AUDIT: ICD-10-PCS | Mod: S$GLB,,, | Performed by: INTERNAL MEDICINE

## 2022-01-13 PROCEDURE — 1159F PR MEDICATION LIST DOCUMENTED IN MEDICAL RECORD: ICD-10-PCS | Mod: HCNC,CPTII,S$GLB, | Performed by: INTERNAL MEDICINE

## 2022-01-13 RX ORDER — FUROSEMIDE 40 MG/1
40 TABLET ORAL DAILY PRN
Qty: 60 TABLET | Refills: 3 | Status: SHIPPED | OUTPATIENT
Start: 2022-01-13 | End: 2022-07-26 | Stop reason: SDUPTHER

## 2022-01-13 RX ORDER — LOSARTAN POTASSIUM 50 MG/1
50 TABLET ORAL 2 TIMES DAILY
Qty: 90 TABLET | Refills: 1 | Status: SHIPPED | OUTPATIENT
Start: 2022-01-13 | End: 2022-06-15

## 2022-01-13 NOTE — PROGRESS NOTES
Subjective:   Patient ID:  Shirley Metz is a 89 y.o. female who presents for cardiac consult of No chief complaint on file.      Shortness of Breath  Pertinent negatives include no leg swelling or rash.   Fatigue  Associated symptoms include fatigue. Pertinent negatives include no rash.   Hypertension  Associated symptoms include shortness of breath.     The patient came in today for cardiac consult of No chief complaint on file.    Shirley Metz is a 89 y.o. female pt with HTN, PAF on Eliquis, PVD, carotid artery disease,  HFpEF, preDM,  hyperlipidemia, palpitations, CKD presents today for follow-up CV eval.    5/1/18  She has significant side effects from many BP meds, could not tolerate Verapamil recently. BP has improved, but sometimes BP gets too low - occasionally 119/53.   She thinks her BP is too low under 130 systolic and wants to decrease some meds. Discussed we can half the clonidine patch and monitor. Will continue other meds for now.She has a good log with BPs daily. Overall BP is well controlled now. Tries to eat low salt diet for the most part but is at Nursing home and can't always control the diet. Other main issue is her arm pain due to shoulder pain will see pain management specialist Dr. Chin.     5/24/18  Has a lot of pain in both arms, will be seeing Dr. Chin and then may need surgery by Dr. Lujan. Reviewed BP log - mostly 130s-140s, once 96/58. Occasional palpitations - usually with waking up or sleeping.     6/11/18  Pt has been getting painful rash at site of clonidine. Also has an infection possibly at left shoulder where procedure happened for shoulders. She is also seeing surgery today for shoulder pain. Pt also feels very weak due to clonidine and has trouble getting up with 0.2 mg patch. Last night BP went up to 190/71 and took a clonidine .1 mg PO dose which improved BP. Bp mildly elevated today but also in a lot of pain.     7/24/18  Pt is scheduled for  shoulder surgery on shoulder Aug 21st. She has been getting accupuncture which has helped to walk. She has stopped clonidine patch is taking pills BID/TID. BP overall have been well controlled, 140-150s/50-60s. Otherwise feels ok.     11/13/18  She is s/p L shoulder surgery currently undergoing PT. Pt had reverse joint repair/surgery due to a cyst in the joint/bone. Has another month of rehab at least. BP overall has been in 130s/60s. No further dizziness, has stopped metoprolol.   ECG - NSR    6/27/19  She had some allergy to chemicals at [facility] while something was sprayed. She had sneezing, runny nose. She was taking echinacea and other herbal meds which helped. She then took Benadryl and accupuncture. BP has been up for a month. She was off metoprolol, doubled clonidine and still elevated. Discussed wants lower HCTZ but BP has been normal at home as well.   ECG - NSR    2/17/20  She was admitted for afib RVR. Patient was started on amiodarone drip and converted to NSR overnight. She was started on PO amiodarone and discharged home with instructions to follow up with cards outpatient.  She went to ER after DC for HTN urgency - she was given HCTZ.      3/25/21  Increased Imdur last visit. /78 today, HR 60s. She has been having more edema, not taking lasix as much.     5/6/21  BP 150s/70s. HR 66.  increased lasix to 40mg weekly PRN. She feels weak still with dyspnea. She had LE u/s and referred for Dr. Ya no angio now but will f/u.     6/17/21  BP elevated today 170s/60s. HR 60s. She has been having more problems with L leg. She has been having sharp L leg pain. Does not want to see pain management.     9/23/21  Lipids improved slightly from last year. BP is much better, has no further low BPs since lowering Imdur. She has been feeling better off amiodarone. She had an injection in tooth and had root canal which improved. She will see Dr. Mendoza at [facility].     11/30/21  BP and HR well  controlled today. She had a dental infection had oral surgery since last visit, she could not chew much so ate ice cream and soft diet. She is off abx.   BP at home low 110/70s.   ECG - NSR, PACs    1/13/22  BP 140s/80s. HR 70s. She changed BP meds - took Imdur only evening, and stopped HCTZ    Patient feels no chest pain, no sob, no leg swelling, no PND,  no dizziness, no syncope, no CNS symptoms.    Patient is compliant with medications.    Carotid u/s 2/2020  Conclusion    · There is 20-39% right Internal Carotid Stenosis.  · There is 20-39% left Internal Carotid Stenosis.          LE u/s  · There is no evidence of a right lower extremity DVT.  · There is no evidence of a left lower extremity DVT.       CONCLUSIONS     1 - Normal left ventricular systolic function (EF 55-60%).     2 - Normal left ventricular diastolic function.     3 - Normal right ventricular systolic function .     4 - Mild to moderate mitral regurgitation.     5 - Concentric hypertrophy.     This document has been electronically    SIGNED BY: Pete Durán MD On: 10/09/2019 20:00    LE u/s  · 50-99% stenosis bilateral SFA with biphasic and monophasic waveforms  · Moderate to severe PAD BLE    Past Medical History:   Diagnosis Date    Anemia 11/29/2018    Anxiety 11/28/2017    Arthritis     Atrial fibrillation with RVR 10/9/2019    Back pain     Basal cell carcinoma 03/29/2018    left mid back    Chronic diastolic congestive heart failure 10/15/2019    CKD (chronic kidney disease) stage 3, GFR 30-59 ml/min 8/8/2016    Colon polyps     reported per patient.     Coronary artery calcification 10/5/2021    Fuchs' corneal dystrophy     General anesthetics causing adverse effect in therapeutic use     woke up during surgery and gagged on ET tube    Glaucoma     Glaucoma     Heart failure with preserved left ventricular function (HFpEF) 7/24/2018    Hyperlipidemia     Hypertension     Macular degeneration dry    Obesity     PVD  (peripheral vascular disease) 4/26/2021    PVD (peripheral vascular disease) 4/26/2021    Squamous cell carcinoma 01/08/2019    left shoulder    Stenosis of carotid artery 10/5/2021    Trouble in sleeping     Type 2 diabetes mellitus without complication, without long-term current use of insulin 2/24/2021    Urinary tract infection        Past Surgical History:   Procedure Laterality Date    ADENOIDECTOMY  1938    BREAST BIOPSY Left     1997. benign    BREAST CYST EXCISION Left 1997 approx    CARPAL TUNNEL RELEASE Right 2012 approx    CATARACT EXTRACTION Bilateral 1994    CHOLECYSTECTOMY  1975    CORNEAL TRANSPLANT Bilateral 08/2015 8/2015 - left; Right 4/2016    EYE SURGERY      Fuch's Corneal dystrophy - had 2nd operation with Dr. Quintanilla    EYE SURGERY      Cataract wIOL    left thumb Left 2011    removed cuboid for arthritis    REVERSE TOTAL SHOULDER ARTHROPLASTY Left 8/21/2018    Procedure: ARTHROPLASTY, SHOULDER, TOTAL, REVERSE;  Surgeon: Solomon Lujan MD;  Location: [facility] OR;  Service: Orthopedics;  Laterality: Left;  WITH BICEP TENODESIS    SKIN CANCER EXCISION Left 2017    BCC removal by Dr. Valadez    SLT- OS (aka TRABECULOPLASTY) Left 05/11/2011    repeat 3/14/12    TENOTOMY Left 8/21/2018    Procedure: TENOTOMY;  Surgeon: Solomon Lujan MD;  Location: [facility] OR;  Service: Orthopedics;  Laterality: Left;    TONSILLECTOMY  1938    TREATMENT OF CARDIAC ARRHYTHMIA N/A 10/10/2019    Procedure: CARDIOVERSION;  Surgeon: Pete Durán MD;  Location: [facility] CATH LAB;  Service: Cardiology;  Laterality: N/A;    TREATMENT OF CARDIAC ARRHYTHMIA N/A 12/6/2019    Procedure: CARDIOVERSION;  Surgeon: Samy Castillo MD;  Location: [facility] CATH LAB;  Service: Cardiology;  Laterality: N/A;       Social History     Tobacco Use    Smoking status: Never Smoker    Smokeless tobacco: Never Used    Tobacco comment: history of passive smoking from her ex-   Substance Use Topics    Alcohol use: Yes      Alcohol/week: 2.0 standard drinks     Types: 2 Standard drinks or equivalent per week     Comment: occ    Drug use: No       Family History   Problem Relation Age of Onset    Heart disease Mother     Hypertension Mother     Cancer Father 88        sarcoma( ?[place]'s ) on leg    Deep vein thrombosis Neg Hx     Ovarian cancer Neg Hx     Breast cancer Neg Hx     Kidney disease Neg Hx     Strabismus Neg Hx     Retinal detachment Neg Hx     Macular degeneration Neg Hx     Glaucoma Neg Hx     Blindness Neg Hx     Amblyopia Neg Hx     Eczema Neg Hx     Lupus Neg Hx     Melanoma Neg Hx     Psoriasis Neg Hx     Diabetes Neg Hx     Stroke Neg Hx     Mental retardation Neg Hx     Mental illness Neg Hx     Hyperlipidemia Neg Hx     COPD Neg Hx     Asthma Neg Hx     Depression Neg Hx     Alcohol abuse Neg Hx     Drug abuse Neg Hx        Patient's Medications   New Prescriptions    FUROSEMIDE (LASIX) 40 MG TABLET    Take 1 tablet (40 mg total) by mouth daily as needed (edema, swelling, fluid).   Previous Medications    ACETAMINOPHEN (TYLENOL) 500 MG TABLET    Take 500 mg by mouth daily as needed. Taking 1 to 3    ALPRAZOLAM (XANAX) 0.5 MG TABLET    Take 1 tablet (0.5 mg total) by mouth nightly as needed for Anxiety.    APIXABAN (ELIQUIS) 2.5 MG TAB    Take 1 tablet (2.5 mg total) by mouth 2 (two) times daily.    B COMPLEX VITAMINS CAPSULE    Take 1 capsule by mouth once daily.    CHOLECALCIFEROL, VITAMIN D3, (VITAMIN D3) 50 MCG (2,000 UNIT) CAP    Take 1 capsule by mouth once daily.    COENZYME Q10 200 MG CAPSULE    Take 400 mg by mouth once daily.     FISH OIL-OMEGA-3 FATTY ACIDS 300-1,000 MG CAPSULE    Take 2 g by mouth 2 (two) times daily.     FLUOCINOLONE ACETONIDE OIL (DERMOTIC OIL) 0.01 % DROP    Apply 1-2 drops twice a day to ears    ISOSORBIDE MONONITRATE (IMDUR) 30 MG 24 HR TABLET    Take 1 tablet (30 mg total) by mouth every evening.    LOTEPREDNOL (LOTEMAX) 0.5 % OPHTHALMIC SUSPENSION     Place 1 drop into both eyes once daily.    METOPROLOL TARTRATE (LOPRESSOR) 25 MG TABLET    TAKE 0.5 TABLETS (12.5 MG TOTAL) BY MOUTH 2 (TWO) TIMES DAILY.    NETARSUDIL (RHOPRESSA) 0.02 % OPHTHALMIC SOLUTION    Place 1 drop into the left eye every evening.    POLYETHYLENE GLYCOL 3350 (MIRALAX ORAL)    Take 17 g by mouth 2 (two) times a day. Taking BID    TRAVOPROST (TRAVATAN Z) 0.004 % OPHTHALMIC SOLUTION    PLACE 1 DROP INTO THE LEFT EYE EVERY EVENING.   Modified Medications    Modified Medication Previous Medication    LOSARTAN (COZAAR) 50 MG TABLET losartan (COZAAR) 25 MG tablet       Take 1 tablet (50 mg total) by mouth 2 (two) times daily.    Take 1 tablet (25 mg total) by mouth 2 (two) times daily.   Discontinued Medications    FUROSEMIDE (LASIX) 20 MG TABLET    TAKE 1 TABLET BY MOUTH 3 TIMES A WEEK ON MONDAY, WEDNESDAY, AND FRIDAY AS NEEDED. TAKE WITH HYDROCHLOROTHIAZIDE EVERY MORNING       Review of Systems   Constitutional: Positive for fatigue.   HENT: Negative.    Eyes: Negative.    Respiratory: Positive for shortness of breath.    Cardiovascular: Negative for leg swelling.   Gastrointestinal: Negative.    Genitourinary: Negative.    Musculoskeletal: Positive for joint pain.   Skin: Negative for rash.   Neurological: Negative for dizziness.   Endo/Heme/Allergies: Negative.    Psychiatric/Behavioral: Negative.        Wt Readings from Last 3 Encounters:   01/13/22 86.7 kg (191 lb 2.2 oz)   12/15/21 87.2 kg (192 lb 3.9 oz)   11/30/21 86.5 kg (190 lb 11.2 oz)     Temp Readings from Last 3 Encounters:   09/27/21 98.2 °F (36.8 °C) (Tympanic)   08/27/21 98.8 °F (37.1 °C) (Tympanic)   08/25/21 99.3 °F (37.4 °C)     BP Readings from Last 3 Encounters:   01/13/22 (!) 144/84   12/15/21 (!) 160/64   11/30/21 122/76     Pulse Readings from Last 3 Encounters:   01/13/22 75   11/30/21 73   10/05/21 72       BP (!) 144/84 (BP Location: Right arm, Patient Position: Sitting)   Pulse 75   Wt 86.7 kg (191 lb 2.2 oz)   LMP   (LMP Unknown)   SpO2 (!) 94%   BMI 32.81 kg/m²     Objective:   Physical Exam  Vitals and nursing note reviewed.   Constitutional:       General: She is not in acute distress.     Appearance: She is well-developed. She is not diaphoretic.   HENT:      Head: Normocephalic and atraumatic.      Nose: Nose normal.   Eyes:      General: No scleral icterus.     Conjunctiva/sclera: Conjunctivae normal.   Neck:      Thyroid: No thyromegaly.      Vascular: No JVD.   Cardiovascular:      Rate and Rhythm: Normal rate and regular rhythm.      Heart sounds: S1 normal and S2 normal. No murmur heard.  No friction rub. No gallop. No S3 or S4 sounds.    Pulmonary:      Effort: Pulmonary effort is normal. No respiratory distress.      Breath sounds: Normal breath sounds. No stridor. No wheezing or rales.   Chest:      Chest wall: No tenderness.   Abdominal:      General: Bowel sounds are normal. There is no distension.      Palpations: Abdomen is soft. There is no mass.      Tenderness: There is no abdominal tenderness. There is no rebound.   Genitourinary:     Comments: Deferred  Musculoskeletal:         General: No tenderness or deformity. Normal range of motion.      Cervical back: Normal range of motion and neck supple.   Lymphadenopathy:      Cervical: No cervical adenopathy.   Skin:     General: Skin is warm and dry.      Coloration: Skin is not pale.      Findings: Erythema and rash (on anterior chest - 2x2 square of clonide) present.   Neurological:      Mental Status: She is alert and oriented to person, place, and time.      Motor: No abnormal muscle tone.      Coordination: Coordination normal.   Psychiatric:         Behavior: Behavior normal.         Thought Content: Thought content normal.         Judgment: Judgment normal.         Lab Results   Component Value Date     11/08/2021    K 4.0 11/08/2021     11/08/2021    CO2 26 11/08/2021    BUN 26 (H) 11/08/2021    CREATININE 1.6 (H) 11/08/2021     (H)  11/08/2021    HGBA1C 5.9 (H) 03/08/2021    MG 2.1 02/12/2020    AST 21 05/03/2021    ALT 15 05/03/2021    ALBUMIN 3.7 11/08/2021    PROT 7.5 05/03/2021    BILITOT 0.5 05/03/2021    WBC 6.64 11/08/2021    HGB 12.7 11/08/2021    HCT 39.2 11/08/2021     (H) 11/08/2021     11/08/2021    INR 1.0 02/12/2020    TSH 0.849 10/09/2019    CHOL 232 (H) 07/19/2021    HDL 48 07/19/2021    LDLCALC 155.8 07/19/2021    TRIG 141 07/19/2021     (H) 03/08/2021     Assessment:      1. Primary hypertension    2. Hypertensive left ventricular hypertrophy, without heart failure    3. Calcification of aorta    4. Mixed hyperlipidemia    5. PVC's (premature ventricular contractions)    6. PAD (peripheral artery disease)    7. Stenosis of carotid artery, unspecified laterality    8. PAF (paroxysmal atrial fibrillation)    9. Carotid artery calcification, bilateral        Plan:   1. HTN  - elevated  - adjust meds, Imdur, stopped HCTZ, cont lasix 40 PRN  - pt has numerous side effects to almost all other BP meds - norvasc, coreg, verapamil, BB  - cont low salt diet  - increase Losartan to 50mg BID    2. HLD  - cont low manuel diet  - rec Repatha/Praluent but does not want now    3. HFpEF -  - cont low salt diet  - changed lasix to 20mg weekly PRN     4. CKD4  - cont to monitor    5. PAF - in NSR   - stop amio due to possible side effects  - cont Eliquis with BB    6. Edema with PVD; carotid artery disease - mild  - cont to monitor   - LE venous and arterial u/s with GEOVANI - moderate PAD; neg for DVT  - f/u with Dr. Ya as needed     7. PreDm/ Obesity  - not on meds now  - rec low manuel diet and weight loss    8. Dyspnea  - will f/u with pulm - Dr. Mendoza at [facility]     Thank you for allowing me to participate in this patient's care. Please do not hesitate to contact me with any questions or concerns. Consult note has been forwarded to the referral physician.

## 2022-01-24 ENCOUNTER — LAB VISIT (OUTPATIENT)
Dept: LAB | Facility: HOSPITAL | Age: 87
End: 2022-01-24
Attending: NURSE PRACTITIONER
Payer: MEDICARE

## 2022-01-24 ENCOUNTER — OFFICE VISIT (OUTPATIENT)
Dept: PRIMARY CARE CLINIC | Facility: CLINIC | Age: 87
End: 2022-01-24
Payer: MEDICARE

## 2022-01-24 VITALS
SYSTOLIC BLOOD PRESSURE: 142 MMHG | TEMPERATURE: 99 F | OXYGEN SATURATION: 94 % | DIASTOLIC BLOOD PRESSURE: 82 MMHG | HEART RATE: 59 BPM | WEIGHT: 190.94 LBS | HEIGHT: 64 IN | BODY MASS INDEX: 32.6 KG/M2

## 2022-01-24 DIAGNOSIS — N19 HYPERTENSIVE HEART AND RENAL DISEASE WITH BOTH (CONGESTIVE) HEART FAILURE AND RENAL FAILURE: ICD-10-CM

## 2022-01-24 DIAGNOSIS — M79.10 MYALGIA: ICD-10-CM

## 2022-01-24 DIAGNOSIS — I13.2 HYPERTENSIVE HEART AND RENAL DISEASE WITH BOTH (CONGESTIVE) HEART FAILURE AND RENAL FAILURE: ICD-10-CM

## 2022-01-24 DIAGNOSIS — I12.9 CKD STAGE 4 SECONDARY TO HYPERTENSION: ICD-10-CM

## 2022-01-24 DIAGNOSIS — E53.8 B12 DEFICIENCY: ICD-10-CM

## 2022-01-24 DIAGNOSIS — N18.4 CKD STAGE 4 SECONDARY TO HYPERTENSION: ICD-10-CM

## 2022-01-24 DIAGNOSIS — M79.10 MYALGIA: Primary | ICD-10-CM

## 2022-01-24 LAB — ERYTHROCYTE [SEDIMENTATION RATE] IN BLOOD BY WESTERGREN METHOD: 33 MM/HR (ref 0–20)

## 2022-01-24 PROCEDURE — 1101F PT FALLS ASSESS-DOCD LE1/YR: CPT | Mod: HCNC,CPTII,S$GLB, | Performed by: NURSE PRACTITIONER

## 2022-01-24 PROCEDURE — 3288F FALL RISK ASSESSMENT DOCD: CPT | Mod: HCNC,CPTII,S$GLB, | Performed by: NURSE PRACTITIONER

## 2022-01-24 PROCEDURE — 36415 COLL VENOUS BLD VENIPUNCTURE: CPT | Mod: HCNC | Performed by: NURSE PRACTITIONER

## 2022-01-24 PROCEDURE — 86039 ANTINUCLEAR ANTIBODIES (ANA): CPT | Mod: HCNC | Performed by: NURSE PRACTITIONER

## 2022-01-24 PROCEDURE — 99499 RISK ADDL DX/OHS AUDIT: ICD-10-PCS | Mod: S$GLB,,, | Performed by: NURSE PRACTITIONER

## 2022-01-24 PROCEDURE — 1125F AMNT PAIN NOTED PAIN PRSNT: CPT | Mod: HCNC,CPTII,S$GLB, | Performed by: NURSE PRACTITIONER

## 2022-01-24 PROCEDURE — 82550 ASSAY OF CK (CPK): CPT | Mod: HCNC | Performed by: NURSE PRACTITIONER

## 2022-01-24 PROCEDURE — 1125F PR PAIN SEVERITY QUANTIFIED, PAIN PRESENT: ICD-10-PCS | Mod: HCNC,CPTII,S$GLB, | Performed by: NURSE PRACTITIONER

## 2022-01-24 PROCEDURE — 86431 RHEUMATOID FACTOR QUANT: CPT | Mod: HCNC | Performed by: NURSE PRACTITIONER

## 2022-01-24 PROCEDURE — 99999 PR PBB SHADOW E&M-EST. PATIENT-LVL IV: ICD-10-PCS | Mod: PBBFAC,HCNC,, | Performed by: NURSE PRACTITIONER

## 2022-01-24 PROCEDURE — 99999 PR PBB SHADOW E&M-EST. PATIENT-LVL IV: CPT | Mod: PBBFAC,HCNC,, | Performed by: NURSE PRACTITIONER

## 2022-01-24 PROCEDURE — 99215 PR OFFICE/OUTPT VISIT, EST, LEVL V, 40-54 MIN: ICD-10-PCS | Mod: HCNC,S$GLB,, | Performed by: NURSE PRACTITIONER

## 2022-01-24 PROCEDURE — 85025 COMPLETE CBC W/AUTO DIFF WBC: CPT | Mod: HCNC | Performed by: NURSE PRACTITIONER

## 2022-01-24 PROCEDURE — 99499 UNLISTED E&M SERVICE: CPT | Mod: S$GLB,,, | Performed by: NURSE PRACTITIONER

## 2022-01-24 PROCEDURE — 84443 ASSAY THYROID STIM HORMONE: CPT | Mod: HCNC | Performed by: NURSE PRACTITIONER

## 2022-01-24 PROCEDURE — 86235 NUCLEAR ANTIGEN ANTIBODY: CPT | Mod: 59,HCNC | Performed by: NURSE PRACTITIONER

## 2022-01-24 PROCEDURE — 86038 ANTINUCLEAR ANTIBODIES: CPT | Mod: HCNC | Performed by: NURSE PRACTITIONER

## 2022-01-24 PROCEDURE — 3288F PR FALLS RISK ASSESSMENT DOCUMENTED: ICD-10-PCS | Mod: HCNC,CPTII,S$GLB, | Performed by: NURSE PRACTITIONER

## 2022-01-24 PROCEDURE — 1101F PR PT FALLS ASSESS DOC 0-1 FALLS W/OUT INJ PAST YR: ICD-10-PCS | Mod: HCNC,CPTII,S$GLB, | Performed by: NURSE PRACTITIONER

## 2022-01-24 PROCEDURE — 80053 COMPREHEN METABOLIC PANEL: CPT | Mod: HCNC | Performed by: NURSE PRACTITIONER

## 2022-01-24 PROCEDURE — 1159F MED LIST DOCD IN RCRD: CPT | Mod: HCNC,CPTII,S$GLB, | Performed by: NURSE PRACTITIONER

## 2022-01-24 PROCEDURE — 1159F PR MEDICATION LIST DOCUMENTED IN MEDICAL RECORD: ICD-10-PCS | Mod: HCNC,CPTII,S$GLB, | Performed by: NURSE PRACTITIONER

## 2022-01-24 PROCEDURE — 85651 RBC SED RATE NONAUTOMATED: CPT | Mod: HCNC | Performed by: NURSE PRACTITIONER

## 2022-01-24 PROCEDURE — 99215 OFFICE O/P EST HI 40 MIN: CPT | Mod: HCNC,S$GLB,, | Performed by: NURSE PRACTITIONER

## 2022-01-24 NOTE — ASSESSMENT & PLAN NOTE
Continue B12 replacement indefinitely.   Check B12, MMA in about two weeks.   May be contributing to myalgia.

## 2022-01-24 NOTE — ASSESSMENT & PLAN NOTE
BP Readings from Last 3 Encounters:   01/24/22 (!) 142/82   01/13/22 (!) 144/84   12/15/21 (!) 160/64     Has been difficult to control.   Continue f/u with nephrology, cardiology.

## 2022-01-24 NOTE — PROGRESS NOTES
"Shirley Metz  01/24/2022  [id number]    Heather Miller NP  Patient Care Team:  Heather Miller NP as PCP - General (Family Medicine)  Wilbert Ware MD as Consulting Physician (Ophthalmology)  Rajinder Quintanilla MD as Consulting Physician (Ophthalmology)  Suzan Gregorio PA-C as Physician Assistant (Rheumatology)  Rosario Valadez MD as Consulting Physician (Dermatology)  Trevon Ramirez MD as Consulting Physician (Cardiology)  Amish Mendoza MD (Pulmonary Disease)  Jaquan Choi MD as Consulting Physician (Vascular Surgery)  Debbie Choi MD as Consulting Physician (Gynecology)  Emmanuel Fish MD as Consulting Physician (Hand Surgery)  PAXTON Chaidez Jr., MD as Consulting Physician (Orthopedic Surgery)        [facility] Primary Care Note      Chief Complaint:  Chief Complaint   Patient presents with    4 mth check        History of Present Illness:  HPI    Working with cardiology for BP mgmt.   Aching all over, worse in AM. Sx improve after an hour.     BP improved after taking furosemide. HCTZ stopped and furosemide dose increased. Feels like she was less swollen when taking HCTZ plus Lasix.    Difficulty walking due to generalized weakness and some pain.    Describes pain in muscles more than joints. Some intermittent LBP with minimal activity. LBP resolved with heat. Weakness shoulders down.     Generalized pruritis back, neck, arms.     Hasn't taken B vitamins regularly in the past month or so.     Hemorrhoids, not going away, has "sore spot."  Using prep H, calendula cream and abx.      Needs to reschedule "Lung scan." F/U CT??   Pt will schedule f/u CT at [facility].     Consider rheumatology appt.     Check B12 labs in about 2 weeks.     ROS      The following were reviewed: Active problem list, medication list, allergies, family history, social history, and Health Maintenance.     History:  Past Medical History:   Diagnosis Date    Anemia 11/29/2018    Anxiety 11/28/2017 "    Arthritis     Atrial fibrillation with RVR 10/9/2019    Back pain     Basal cell carcinoma 03/29/2018    left mid back    Chronic diastolic congestive heart failure 10/15/2019    CKD (chronic kidney disease) stage 3, GFR 30-59 ml/min 8/8/2016    Colon polyps     reported per patient.     Coronary artery calcification 10/5/2021    Fuchs' corneal dystrophy     General anesthetics causing adverse effect in therapeutic use     woke up during surgery and gagged on ET tube    Glaucoma     Glaucoma     Heart failure with preserved left ventricular function (HFpEF) 7/24/2018    Hyperlipidemia     Hypertension     Macular degeneration dry    Obesity     PVD (peripheral vascular disease) 4/26/2021    PVD (peripheral vascular disease) 4/26/2021    Squamous cell carcinoma 01/08/2019    left shoulder    Stenosis of carotid artery 10/5/2021    Trouble in sleeping     Type 2 diabetes mellitus without complication, without long-term current use of insulin 2/24/2021    Urinary tract infection      Past Surgical History:   Procedure Laterality Date    ADENOIDECTOMY  1938    BREAST BIOPSY Left     1997. benign    BREAST CYST EXCISION Left 1997 approx    CARPAL TUNNEL RELEASE Right 2012 approx    CATARACT EXTRACTION Bilateral 1994    CHOLECYSTECTOMY  1975    CORNEAL TRANSPLANT Bilateral 08/2015 8/2015 - left; Right 4/2016    EYE SURGERY      Fuch's Corneal dystrophy - had 2nd operation with Dr. Quintanilla    EYE SURGERY      Cataract wIOL    left thumb Left 2011    removed cuboid for arthritis    REVERSE TOTAL SHOULDER ARTHROPLASTY Left 8/21/2018    Procedure: ARTHROPLASTY, SHOULDER, TOTAL, REVERSE;  Surgeon: Solomon Lujan MD;  Location: [facility];  Service: Orthopedics;  Laterality: Left;  WITH BICEP TENODESIS    SKIN CANCER EXCISION Left 2017    BCC removal by Dr. Valadez    SLT- OS (aka TRABECULOPLASTY) Left 05/11/2011    repeat 3/14/12    TENOTOMY Left 8/21/2018    Procedure: TENOTOMY;   Surgeon: Solomon Lujan MD;  Location: [facility] OR;  Service: Orthopedics;  Laterality: Left;    TONSILLECTOMY  1938    TREATMENT OF CARDIAC ARRHYTHMIA N/A 10/10/2019    Procedure: CARDIOVERSION;  Surgeon: Pete Durán MD;  Location: [facility] CATH LAB;  Service: Cardiology;  Laterality: N/A;    TREATMENT OF CARDIAC ARRHYTHMIA N/A 12/6/2019    Procedure: CARDIOVERSION;  Surgeon: Samy Castillo MD;  Location: [facility] CATH LAB;  Service: Cardiology;  Laterality: N/A;     Family History   Problem Relation Age of Onset    Heart disease Mother     Hypertension Mother     Cancer Father 88        sarcoma( ?[place]'s ) on leg    Deep vein thrombosis Neg Hx     Ovarian cancer Neg Hx     Breast cancer Neg Hx     Kidney disease Neg Hx     Strabismus Neg Hx     Retinal detachment Neg Hx     Macular degeneration Neg Hx     Glaucoma Neg Hx     Blindness Neg Hx     Amblyopia Neg Hx     Eczema Neg Hx     Lupus Neg Hx     Melanoma Neg Hx     Psoriasis Neg Hx     Diabetes Neg Hx     Stroke Neg Hx     Mental retardation Neg Hx     Mental illness Neg Hx     Hyperlipidemia Neg Hx     COPD Neg Hx     Asthma Neg Hx     Depression Neg Hx     Alcohol abuse Neg Hx     Drug abuse Neg Hx      Social History     Socioeconomic History    Marital status:     Number of children: 3   Tobacco Use    Smoking status: Never Smoker    Smokeless tobacco: Never Used    Tobacco comment: history of passive smoking from her ex-   Substance and Sexual Activity    Alcohol use: Yes     Alcohol/week: 2.0 standard drinks     Types: 2 Standard drinks or equivalent per week     Comment: occ    Drug use: No    Sexual activity: Not Currently     Partners: Male     Birth control/protection: Post-menopausal     Comment: discussed protection for STD's   Other Topics Concern    Are you pregnant or think you may be? No    Breast-feeding No   Social History Narrative     x 2,  x 1. Retired artist - previously  doing jewelry/wall pieces; ; lives in [place] [facility]; has 3 sons - 52( lives in [place], 54, and 56;exercises minimally - limited due to back issues. Still drives. Does have a Living Will.     Social Determinants of Health     Financial Resource Strain: Low Risk     Difficulty of Paying Living Expenses: Not hard at all   Food Insecurity: No Food Insecurity    Worried About Running Out of Food in the Last Year: Never true    Ran Out of Food in the Last Year: Never true   Transportation Needs: No Transportation Needs    Lack of Transportation (Medical): No    Lack of Transportation (Non-Medical): No   Physical Activity: Insufficiently Active    Days of Exercise per Week: 3 days    Minutes of Exercise per Session: 10 min   Stress: No Stress Concern Present    Feeling of Stress : Not at all   Social Connections: Moderately Isolated    Frequency of Communication with Friends and Family: Three times a week    Frequency of Social Gatherings with Friends and Family: Three times a week    Attends Holiness Services: Never    Active Member of Clubs or Organizations: Yes    Attends Club or Organization Meetings: More than 4 times per year    Marital Status:    Housing Stability: Low Risk     Unable to Pay for Housing in the Last Year: No    Number of Places Lived in the Last Year: 1    Unstable Housing in the Last Year: No     Patient Active Problem List   Diagnosis    HTN (hypertension)    Mixed hyperlipidemia    Primary osteoarthritis involving multiple joints    Macular degeneration - Both Eyes    Fuch's endothelial dystrophy - Both Eyes    Lens replaced by other means    COAG (chronic open-angle glaucoma) - Both Eyes    Blepharitis, unspecified - Both Eyes    Abnormal ECG    PVC's (premature ventricular contractions)    Other microscopic hematuria    LVH (left ventricular hypertrophy) due to hypertensive disease    Osteopenia of hip    Calcification of aorta     Neuropathy    Unequal blood pressures in paired extremities    Medication side effects    History of cornea transplant    Pseudophakia    Insomnia    Anxiety    Pre-diabetes    Obesity (BMI 30.0-34.9)    Bilateral shoulder region arthritis    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    Anemia    Thrombus of left atrial appendage    PAF (paroxysmal atrial fibrillation)    Elevated liver enzymes    Chronic anticoagulation    Keratitis sicca, bilateral    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    PAD (peripheral artery disease)    Statin intolerance    Pulmonary nodules/lesions, multiple    Spinal stenosis at L4-L5 level    Pulmonary granuloma    Lumbar spondylosis    Bilateral recurrent inguinal hernia without obstruction or gangrene    LLQ abdominal pain    Pain in left leg    Cellulitis, face    Rectal abnormality    Stenosis of carotid artery    Carotid artery calcification, bilateral    B12 deficiency     Review of patient's allergies indicates:   Allergen Reactions    Carvedilol Swelling     lips  lips    Claritin [loratadine] Other (See Comments)     Double vision/spots    Erythromycin Other (See Comments)     Complete weakness/could not walk    Gadolinium-containing contrast media Other (See Comments)     angioedema    Hydrocodone-acetaminophen      wheezing  wheezing  wheezing    Inveltys [loteprednol etabonate] Palpitations and Other (See Comments)     Patient stated bp went up.    Labetalol Shortness Of Breath, Nausea Only and Other (See Comments)     Palpitations, LE weakness    Macrolide antibiotics Other (See Comments)     Complete weakness/could not walk  Complete weakness/could not walk  Complete weakness/could not walk  Complete weakness/could not walk    Moxifloxacin Other (See Comments)     Hives   muscle soreness    Naproxen      wheezing  wheezing  wheezing    Statins-hmg-coa reductase inhibitors Other (See  Comments)     Severe Muscle weakness  Muscle weakness  Severe Muscle weakness    Timolol Other (See Comments)     Severe muscle weakness, eye problems.    Verapamil Diarrhea     Severe diarrhea.    Vibramycin [doxycycline calcium] Other (See Comments)     Weakness nausea   sob    Amlodipine      Myalgias      Fenofibrate      Causes muscle weakness    Hydralazine analogues      Muscle tremors/aches      Isosorbide      Tolerates Imdur    Adhesive Rash     Clonidine patch - 2 mfg - contact dermatitis    Allegra [fexofenadine] Other (See Comments)     Double vision/spots     Codeine Diarrhea and Other (See Comments)     Loss of balance     Doxazosin Palpitations    Hydrocortisone (bulk) Other (See Comments)     Only suppository/ caused muscle weakness    Patanol [olopatadine] Other (See Comments)     Muscle weakness    Prednisone Anxiety    Xalatan [latanoprost] Other (See Comments)     Muscle weakness       Medications:  Current Outpatient Medications on File Prior to Visit   Medication Sig Dispense Refill    acetaminophen (TYLENOL) 500 MG tablet Take 500 mg by mouth daily as needed. Taking 1 to 3      apixaban (ELIQUIS) 2.5 mg Tab Take 1 tablet (2.5 mg total) by mouth 2 (two) times daily. 180 tablet 3    b complex vitamins capsule Take 1 capsule by mouth once daily.      cholecalciferol, vitamin D3, (VITAMIN D3) 50 mcg (2,000 unit) Cap Take 1 capsule by mouth once daily.      coenzyme Q10 200 mg capsule Take 400 mg by mouth once daily.       fish oil-omega-3 fatty acids 300-1,000 mg capsule Take 2 g by mouth 2 (two) times daily.       furosemide (LASIX) 40 MG tablet Take 1 tablet (40 mg total) by mouth daily as needed (edema, swelling, fluid). 60 tablet 3    isosorbide mononitrate (IMDUR) 30 MG 24 hr tablet Take 1 tablet (30 mg total) by mouth every evening. 30 tablet 3    losartan (COZAAR) 50 MG tablet Take 1 tablet (50 mg total) by mouth 2 (two) times daily. 90 tablet 1    loteprednol  (LOTEMAX) 0.5 % ophthalmic suspension Place 1 drop into both eyes once daily. (Patient taking differently: Place 1 drop into both eyes once daily. Right eye only) 10 mL 3    metoprolol tartrate (LOPRESSOR) 25 MG tablet TAKE 0.5 TABLETS (12.5 MG TOTAL) BY MOUTH 2 (TWO) TIMES DAILY. 90 tablet 1    netarsudiL (RHOPRESSA) 0.02 % ophthalmic solution Place 1 drop into the left eye every evening. 2.5 mL 12    POLYETHYLENE GLYCOL 3350 (MIRALAX ORAL) Take 17 g by mouth 2 (two) times a day. Taking BID      travoprost (TRAVATAN Z) 0.004 % ophthalmic solution PLACE 1 DROP INTO THE LEFT EYE EVERY EVENING. 2.5 mL 10    ALPRAZolam (XANAX) 0.5 MG tablet Take 1 tablet (0.5 mg total) by mouth nightly as needed for Anxiety. 30 tablet 5    fluocinolone acetonide oiL (DERMOTIC OIL) 0.01 % Drop Apply 1-2 drops twice a day to ears (Patient not taking: Reported on 1/24/2022) 20 mL 1     Current Facility-Administered Medications on File Prior to Visit   Medication Dose Route Frequency Provider Last Rate Last Admin    sodium chloride 0.9% flush 3 mL  3 mL Intravenous PRN Steve Galarza PA-C           Medications have been reviewed and reconciled with patient at visit today.    Barriers to medications present (no )    Adverse reactions to current medications (no)      Exam:  Vitals:    01/24/22 1306   BP: (!) 142/82   Pulse: (!) 59   Temp: 98.9 °F (37.2 °C)     Weight: 86.6 kg (190 lb 14.7 oz)   Body mass index is 32.77 kg/m².      BP Readings from Last 3 Encounters:   01/24/22 (!) 142/82   01/13/22 (!) 144/84   12/15/21 (!) 160/64     Wt Readings from Last 3 Encounters:   01/24/22 1306 86.6 kg (190 lb 14.7 oz)   01/13/22 1312 86.7 kg (191 lb 2.2 oz)   12/15/21 0959 87.2 kg (192 lb 3.9 oz)            Laboratory Reviewed: (Yes)  Lab Results   Component Value Date    WBC 6.64 11/08/2021    HGB 12.7 11/08/2021    HCT 39.2 11/08/2021     11/08/2021    CHOL 232 (H) 07/19/2021    TRIG 141 07/19/2021    HDL 48 07/19/2021    ALT 15  05/03/2021    AST 21 05/03/2021     11/08/2021    K 4.0 11/08/2021     11/08/2021    CREATININE 1.6 (H) 11/08/2021    BUN 26 (H) 11/08/2021    CO2 26 11/08/2021    TSH 0.849 10/09/2019    INR 1.0 02/12/2020    HGBA1C 5.9 (H) 03/08/2021           Health Maintenance  Health Maintenance Topics with due status: Not Due       Topic Last Completion Date    DEXA SCAN 10/19/2020    Lipid Panel 07/19/2021     Health Maintenance Due   Topic Date Due    Pneumococcal Vaccines (Age 65+) (1 of 2 - PPSV23) Never done    Shingles Vaccine (1 of 2) Never done    Influenza Vaccine (1) 09/01/2021    TETANUS VACCINE  11/09/2021     Not interested in flu vacc, pneumovax.         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   Continue HTN mgmt, HF compensated.   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   Continue f/u with nephrology, cardiology.               Endocrine    B12 deficiency     Continue B12 replacement indefinitely.   Check B12, MMA in about two weeks.   May be contributing to myalgia.         Relevant Orders    Vitamin B12    Methylmalonic Acid, Serum      Other Visit Diagnoses     Myalgia    -  Primary    Relevant Orders    CBC Auto Differential    Sedimentation rate (Completed)    CK    Rheumatoid Factor    VIRY Screen w/Reflex    TSH    Comprehensive Metabolic Panel            Plan:  Myalgia  -     CBC Auto Differential; Future; Expected date: 01/24/2022  -     Sedimentation rate; Future; Expected date: 01/24/2022  -     CK; Future; Expected date: 01/24/2022  -     Rheumatoid Factor; Future; Expected date: 01/24/2022  -     VIRY Screen w/Reflex; Future; Expected date:  01/24/2022  -     TSH; Future; Expected date: 01/24/2022  -     Comprehensive Metabolic Panel; Future; Expected date: 01/24/2022    B12 deficiency  -     Vitamin B12; Future; Expected date: 02/07/2022  -     Methylmalonic Acid, Serum; Future; Expected date: 02/07/2022    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 Patient expressed understanding. Patient was given the opportunity to ask questions and be an active participant in their medical care. Patient had no further questions or concerns at this time.   -Documentation of patient's health and condition was obtained from family member who was present during visit.  -Patient is an overall moderate risk for health complications from their medical conditions.       Follow up: Follow up in about 4 weeks (around 2/21/2022) for Medication Change, Labs.      After visit summary printed and given to patient upon discharge.  Patient goals and care plan are included in After visit summary.    Total medical decision making time was 63 min.  The following issues were discussed: The primary encounter diagnosis was Myalgia. 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    Health maintenance needs, recent test results and goals of care discussed with pt and questions answered.

## 2022-01-25 LAB
ALBUMIN SERPL BCP-MCNC: 3.9 G/DL (ref 3.5–5.2)
ALP SERPL-CCNC: 65 U/L (ref 55–135)
ALT SERPL W/O P-5'-P-CCNC: 16 U/L (ref 10–44)
ANA PATTERN 1: NORMAL
ANA SER QL IF: POSITIVE
ANA TITR SER IF: NORMAL {TITER}
ANION GAP SERPL CALC-SCNC: 14 MMOL/L (ref 8–16)
AST SERPL-CCNC: 21 U/L (ref 10–40)
BASOPHILS # BLD AUTO: 0.08 K/UL (ref 0–0.2)
BASOPHILS NFR BLD: 1.1 % (ref 0–1.9)
BILIRUB SERPL-MCNC: 0.3 MG/DL (ref 0.1–1)
BUN SERPL-MCNC: 28 MG/DL (ref 8–23)
CALCIUM SERPL-MCNC: 9.6 MG/DL (ref 8.7–10.5)
CHLORIDE SERPL-SCNC: 107 MMOL/L (ref 95–110)
CK SERPL-CCNC: 55 U/L (ref 20–180)
CO2 SERPL-SCNC: 22 MMOL/L (ref 23–29)
CREAT SERPL-MCNC: 1.4 MG/DL (ref 0.5–1.4)
DIFFERENTIAL METHOD: ABNORMAL
EOSINOPHIL # BLD AUTO: 0.2 K/UL (ref 0–0.5)
EOSINOPHIL NFR BLD: 2.9 % (ref 0–8)
ERYTHROCYTE [DISTWIDTH] IN BLOOD BY AUTOMATED COUNT: 12.7 % (ref 11.5–14.5)
EST. GFR  (AFRICAN AMERICAN): 38.4 ML/MIN/1.73 M^2
EST. GFR  (NON AFRICAN AMERICAN): 33.3 ML/MIN/1.73 M^2
GLUCOSE SERPL-MCNC: 100 MG/DL (ref 70–110)
HCT VFR BLD AUTO: 41.1 % (ref 37–48.5)
HGB BLD-MCNC: 13 G/DL (ref 12–16)
IMM GRANULOCYTES # BLD AUTO: 0.02 K/UL (ref 0–0.04)
IMM GRANULOCYTES NFR BLD AUTO: 0.3 % (ref 0–0.5)
LYMPHOCYTES # BLD AUTO: 1.8 K/UL (ref 1–4.8)
LYMPHOCYTES NFR BLD: 24.4 % (ref 18–48)
MCH RBC QN AUTO: 32.5 PG (ref 27–31)
MCHC RBC AUTO-ENTMCNC: 31.6 G/DL (ref 32–36)
MCV RBC AUTO: 103 FL (ref 82–98)
MONOCYTES # BLD AUTO: 0.9 K/UL (ref 0.3–1)
MONOCYTES NFR BLD: 11.3 % (ref 4–15)
NEUTROPHILS # BLD AUTO: 4.5 K/UL (ref 1.8–7.7)
NEUTROPHILS NFR BLD: 60 % (ref 38–73)
NRBC BLD-RTO: 0 /100 WBC
PLATELET # BLD AUTO: 253 K/UL (ref 150–450)
PMV BLD AUTO: 10.3 FL (ref 9.2–12.9)
POTASSIUM SERPL-SCNC: 4.1 MMOL/L (ref 3.5–5.1)
PROT SERPL-MCNC: 7.7 G/DL (ref 6–8.4)
RBC # BLD AUTO: 4 M/UL (ref 4–5.4)
RHEUMATOID FACT SERPL-ACNC: <13 IU/ML (ref 0–15)
SODIUM SERPL-SCNC: 143 MMOL/L (ref 136–145)
TSH SERPL DL<=0.005 MIU/L-ACNC: 1.41 UIU/ML (ref 0.4–4)
WBC # BLD AUTO: 7.55 K/UL (ref 3.9–12.7)

## 2022-01-26 LAB
ANTI SM ANTIBODY: 0.07 RATIO (ref 0–0.99)
ANTI SM/RNP ANTIBODY: 0.12 RATIO (ref 0–0.99)
ANTI-SM INTERPRETATION: NEGATIVE
ANTI-SM/RNP INTERPRETATION: NEGATIVE
ANTI-SSA ANTIBODY: 0.06 RATIO (ref 0–0.99)
ANTI-SSA INTERPRETATION: NEGATIVE
ANTI-SSB ANTIBODY: 0.07 RATIO (ref 0–0.99)
ANTI-SSB INTERPRETATION: NEGATIVE
DSDNA AB SER-ACNC: NORMAL [IU]/ML

## 2022-01-27 DIAGNOSIS — R76.8 ANA POSITIVE: Primary | ICD-10-CM

## 2022-02-09 ENCOUNTER — TELEPHONE (OUTPATIENT)
Dept: INTERNAL MEDICINE | Facility: CLINIC | Age: 87
End: 2022-02-09
Payer: COMMERCIAL

## 2022-02-09 NOTE — TELEPHONE ENCOUNTER
----- Message from Leida Hand sent at 2/9/2022  9:12 AM CST -----  Contact: Patient, [phone]  Calling to speak with the nurse. Please call her. Thanks.

## 2022-02-09 NOTE — TELEPHONE ENCOUNTER
Patient stated she sent a my chart message Friday but I did not see any message, checked on Lungs yesterday and she does have a spot on her lung and she is having a biopsy. Patient had a blood test yesterday.  at [facility]. She just wanted you to know.

## 2022-02-18 NOTE — PROGRESS NOTES
"Shirley Metz  03/11/2022  [id number]    Heather Miller NP  Patient Care Team:  Heather Miller NP as PCP - General (Family Medicine)  Wilbert Ware MD as Consulting Physician (Ophthalmology)  Rajinder Quintanilla MD as Consulting Physician (Ophthalmology)  Suzan Gregorio PA-C as Physician Assistant (Rheumatology)  Rosario Valadez MD as Consulting Physician (Dermatology)  Trevon Ramirez MD as Consulting Physician (Cardiology)  Amish Mendoza MD (Pulmonary Disease)  Jaquan Choi MD as Consulting Physician (Vascular Surgery)  Debbie Choi MD (Inactive) as Consulting Physician (Gynecology)  Emmanuel Fish MD as Consulting Physician (Hand Surgery)  PAXTON Chaidez Jr., MD as Consulting Physician (Orthopedic Surgery)        [facility] Primary Care Note      Chief Complaint:  Chief Complaint   Patient presents with    4 week f/u: med change/labs       History of Present Illness:  HPI    She had stopped taking B vitamins and was planning to resume last month.   Myalgias last visit. Positive VIRY, CRP? Appt with rheumatology scheduled in June.   BP was mildly elevated. Has appt with Dr Jiménez in June.     Has lung bx planned of LLL growing mass. Dr Mendoza.   Has biopsy planned at [facility].     Still having trouble with MyChart.     Dr Martinez on [facility] as PCP.     Myalgias are improved x2 weeks, not sure why.     BP improved.  HCTZ stopped, now taking Lasix 40 mg daily PRN. Hasn't reduced edema completely, still some PM edema. Taking Q 4 days. Now having problems with constipation, dry mouth/ears.    Considering herbal remedies for fluid.     Breathing is good. Some RUIZ wearing mask at baseline. Occasional wheeze relieved by echinacea.    "Muscle masses in my legs" since rxn to statin. No change in size/quality.           CT Chest:  Impression     Mosaic perfusion pattern persists with diffuse bilateral symmetric   alternating geographic areas of increased or decreased attenuation most "   characteristic of small airway process.  New nodular consolidation dependent   left lower lobe lung base, 4.6 x 1.2 x 2 cm, posterior costophrenic angle where   previously there were (2) regional nodules.  Coalescing, enlarging pulmonary   nodules versus pneumonia or atelectasis sequela.  Recommend continued close   interval follow-up.  PET/CT may be helpful to further assess for abnormal   biologic activity.  Again, there is a pattern of multiple stable pulmonary   nodules, multiple new pulmonary nodules, and a couple of resolved pulmonary   nodules, as detailed above.  Cardiomegaly.               Review of Systems   Constitutional: Negative for chills, fever and weight loss.   Respiratory: Negative for cough.    Cardiovascular: Negative for chest pain and leg swelling.   Musculoskeletal: Positive for myalgias (chronic but improved).         The following were reviewed: Active problem list, medication list, allergies, family history, social history, and Health Maintenance.     History:  Past Medical History:   Diagnosis Date    Anemia 11/29/2018    Anxiety 11/28/2017    Arthritis     Atrial fibrillation with RVR 10/9/2019    Back pain     Basal cell carcinoma 03/29/2018    left mid back    Chronic diastolic congestive heart failure 10/15/2019    CKD (chronic kidney disease) stage 3, GFR 30-59 ml/min 8/8/2016    Colon polyps     reported per patient.     Coronary artery calcification 10/5/2021    Fuchs' corneal dystrophy     General anesthetics causing adverse effect in therapeutic use     woke up during surgery and gagged on ET tube    Glaucoma     Glaucoma     Heart failure with preserved left ventricular function (HFpEF) 7/24/2018    Hyperlipidemia     Hypertension     Macular degeneration dry    Obesity     PVD (peripheral vascular disease) 4/26/2021    PVD (peripheral vascular disease) 4/26/2021    Squamous cell carcinoma 01/08/2019    left shoulder    Stenosis of carotid artery  10/5/2021    Trouble in sleeping     Type 2 diabetes mellitus without complication, without long-term current use of insulin 2/24/2021    Urinary tract infection      Past Surgical History:   Procedure Laterality Date    ADENOIDECTOMY  1938    BREAST BIOPSY Left     1997. benign    BREAST CYST EXCISION Left 1997 approx    CARPAL TUNNEL RELEASE Right 2012 approx    CATARACT EXTRACTION Bilateral 1994    CHOLECYSTECTOMY  1975    CORNEAL TRANSPLANT Bilateral 08/2015 8/2015 - left; Right 4/2016    EYE SURGERY      Fuch's Corneal dystrophy - had 2nd operation with Dr. Quintanilla    EYE SURGERY      Cataract wIOL    left thumb Left 2011    removed cuboid for arthritis    REVERSE TOTAL SHOULDER ARTHROPLASTY Left 8/21/2018    Procedure: ARTHROPLASTY, SHOULDER, TOTAL, REVERSE;  Surgeon: Solomon Lujan MD;  Location: [facility] OR;  Service: Orthopedics;  Laterality: Left;  WITH BICEP TENODESIS    SKIN CANCER EXCISION Left 2017    BCC removal by Dr. Valadez    SLT- OS (aka TRABECULOPLASTY) Left 05/11/2011    repeat 3/14/12    TENOTOMY Left 8/21/2018    Procedure: TENOTOMY;  Surgeon: Solomon Lujan MD;  Location: [facility] OR;  Service: Orthopedics;  Laterality: Left;    TONSILLECTOMY  1938    TREATMENT OF CARDIAC ARRHYTHMIA N/A 10/10/2019    Procedure: CARDIOVERSION;  Surgeon: Pete Durán MD;  Location: [facility] CATH LAB;  Service: Cardiology;  Laterality: N/A;    TREATMENT OF CARDIAC ARRHYTHMIA N/A 12/6/2019    Procedure: CARDIOVERSION;  Surgeon: Samy Castillo MD;  Location: [facility] CATH LAB;  Service: Cardiology;  Laterality: N/A;     Family History   Problem Relation Age of Onset    Heart disease Mother     Hypertension Mother     Cancer Father 88        sarcoma( ?Kaposi's ) on leg    Deep vein thrombosis Neg Hx     Ovarian cancer Neg Hx     Breast cancer Neg Hx     Kidney disease Neg Hx     Strabismus Neg Hx     Retinal detachment Neg Hx     Macular degeneration Neg Hx     Glaucoma Neg Hx     Blindness  Neg Hx     Amblyopia Neg Hx     Eczema Neg Hx     Lupus Neg Hx     Melanoma Neg Hx     Psoriasis Neg Hx     Diabetes Neg Hx     Stroke Neg Hx     Mental retardation Neg Hx     Mental illness Neg Hx     Hyperlipidemia Neg Hx     COPD Neg Hx     Asthma Neg Hx     Depression Neg Hx     Alcohol abuse Neg Hx     Drug abuse Neg Hx      Social History     Socioeconomic History    Marital status:     Number of children: 3   Tobacco Use    Smoking status: Never Smoker    Smokeless tobacco: Never Used    Tobacco comment: history of passive smoking from her ex-   Substance and Sexual Activity    Alcohol use: Yes     Alcohol/week: 2.0 standard drinks     Types: 2 Standard drinks or equivalent per week     Comment: occ    Drug use: No    Sexual activity: Not Currently     Partners: Male     Birth control/protection: Post-menopausal     Comment: discussed protection for STD's   Other Topics Concern    Are you pregnant or think you may be? No    Breast-feeding No   Social History Narrative     x 2,  x 1. Retired artist - previously doing jewelry/wall pieces; ; lives in [facility] [place] [facility]; has 3 sons - 52( lives in [place], 54, and 56;exercises minimally - limited due to back issues. Still drives. Does have a Living Will.     Social Determinants of Health     Financial Resource Strain: Low Risk     Difficulty of Paying Living Expenses: Not hard at all   Food Insecurity: No Food Insecurity    Worried About Running Out of Food in the Last Year: Never true    Ran Out of Food in the Last Year: Never true   Transportation Needs: No Transportation Needs    Lack of Transportation (Medical): No    Lack of Transportation (Non-Medical): No   Physical Activity: Insufficiently Active    Days of Exercise per Week: 3 days    Minutes of Exercise per Session: 10 min   Stress: No Stress Concern Present    Feeling of Stress : Not at all   Social Connections: Moderately  Isolated    Frequency of Communication with Friends and Family: Three times a week    Frequency of Social Gatherings with Friends and Family: Three times a week    Attends Protestant Services: Never    Active Member of Clubs or Organizations: Yes    Attends Club or Organization Meetings: More than 4 times per year    Marital Status:    Housing Stability: Low Risk     Unable to Pay for Housing in the Last Year: No    Number of Places Lived in the Last Year: 1    Unstable Housing in the Last Year: No     Patient Active Problem List   Diagnosis    HTN (hypertension)    Mixed hyperlipidemia    Primary osteoarthritis involving multiple joints    Macular degeneration - Both Eyes    Fuch's endothelial dystrophy - Both Eyes    Lens replaced by other means    COAG (chronic open-angle glaucoma) - Both Eyes    Blepharitis, unspecified - Both Eyes    Abnormal ECG    PVC's (premature ventricular contractions)    Other microscopic hematuria    LVH (left ventricular hypertrophy) due to hypertensive disease    Osteopenia of hip    Calcification of aorta    Neuropathy    Unequal blood pressures in paired extremities    Medication side effects    History of cornea transplant    Pseudophakia    Insomnia    Anxiety    Pre-diabetes    Obesity (BMI 30.0-34.9)    Bilateral shoulder region arthritis    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    Anemia    Thrombus of left atrial appendage    PAF (paroxysmal atrial fibrillation)    Elevated liver enzymes    Chronic anticoagulation    Keratitis sicca, bilateral    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    PAD (peripheral artery disease)    Statin intolerance    Pulmonary nodules/lesions, multiple    Spinal stenosis at L4-L5 level    Pulmonary granuloma    Lumbar spondylosis    Bilateral recurrent inguinal hernia without obstruction or gangrene    LLQ abdominal pain    Pain in left leg     Cellulitis, face    Rectal abnormality    Stenosis of carotid artery    Carotid artery calcification, bilateral    B12 deficiency     Review of patient's allergies indicates:   Allergen Reactions    Carvedilol Swelling     lips  lips    Claritin [loratadine] Other (See Comments)     Double vision/spots    Erythromycin Other (See Comments)     Complete weakness/could not walk    Gadolinium-containing contrast media Other (See Comments)     angioedema    Hydrocodone-acetaminophen      wheezing  wheezing  wheezing    Inveltys [loteprednol etabonate] Palpitations and Other (See Comments)     Patient stated bp went up.    Labetalol Shortness Of Breath, Nausea Only and Other (See Comments)     Palpitations, LE weakness    Macrolide antibiotics Other (See Comments)     Complete weakness/could not walk  Complete weakness/could not walk  Complete weakness/could not walk  Complete weakness/could not walk    Moxifloxacin Other (See Comments)     Hives   muscle soreness    Naproxen      wheezing  wheezing  wheezing    Statins-hmg-coa reductase inhibitors Other (See Comments)     Severe Muscle weakness  Muscle weakness  Severe Muscle weakness    Timolol Other (See Comments)     Severe muscle weakness, eye problems.    Verapamil Diarrhea     Severe diarrhea.    Vibramycin [doxycycline calcium] Other (See Comments)     Weakness nausea   sob    Amlodipine      Myalgias      Fenofibrate      Causes muscle weakness    Hydralazine analogues      Muscle tremors/aches      Isosorbide      Tolerates Imdur    Adhesive Rash     Clonidine patch - 2 mfg - contact dermatitis    Allegra [fexofenadine] Other (See Comments)     Double vision/spots     Codeine Diarrhea and Other (See Comments)     Loss of balance     Doxazosin Palpitations    Hydrocortisone (bulk) Other (See Comments)     Only suppository/ caused muscle weakness    Patanol [olopatadine] Other (See Comments)     Muscle weakness    Prednisone  Anxiety    Xalatan [latanoprost] Other (See Comments)     Muscle weakness       Medications:  Current Outpatient Medications on File Prior to Visit   Medication Sig Dispense Refill    acetaminophen (TYLENOL) 500 MG tablet Take 500 mg by mouth daily as needed. Taking 1 to 3      apixaban (ELIQUIS) 2.5 mg Tab Take 1 tablet (2.5 mg total) by mouth 2 (two) times daily. 180 tablet 3    b complex vitamins capsule Take 1 capsule by mouth once daily.      cholecalciferol, vitamin D3, (VITAMIN D3) 50 mcg (2,000 unit) Cap Take 1 capsule by mouth once daily.      coenzyme Q10 200 mg capsule Take 400 mg by mouth once daily.       fish oil-omega-3 fatty acids 300-1,000 mg capsule Take 2 g by mouth 2 (two) times daily.       furosemide (LASIX) 40 MG tablet Take 1 tablet (40 mg total) by mouth daily as needed (edema, swelling, fluid). 60 tablet 3    isosorbide mononitrate (IMDUR) 30 MG 24 hr tablet Take 1 tablet (30 mg total) by mouth every evening. 30 tablet 3    losartan (COZAAR) 50 MG tablet Take 1 tablet (50 mg total) by mouth 2 (two) times daily. 90 tablet 1    loteprednol (LOTEMAX) 0.5 % ophthalmic suspension Place 1 drop into both eyes once daily. (Patient taking differently: Place 1 drop into both eyes once daily. Right eye only) 10 mL 3    netarsudiL (RHOPRESSA) 0.02 % ophthalmic solution Place 1 drop into the left eye every evening. 2.5 mL 12    POLYETHYLENE GLYCOL 3350 (MIRALAX ORAL) Take 17 g by mouth 2 (two) times a day. Taking BID      travoprost (TRAVATAN Z) 0.004 % ophthalmic solution PLACE 1 DROP INTO THE LEFT EYE EVERY EVENING. 2.5 mL 10    fluocinolone acetonide oiL (DERMOTIC OIL) 0.01 % Drop Apply 1-2 drops twice a day to ears (Patient not taking: No sig reported) 20 mL 1     Current Facility-Administered Medications on File Prior to Visit   Medication Dose Route Frequency Provider Last Rate Last Admin    sodium chloride 0.9% flush 3 mL  3 mL Intravenous PRN Steve Galarza PA-C            Medications have been reviewed and reconciled with patient at visit today.    Barriers to medications present (no )    Adverse reactions to current medications (no)      Exam:  Vitals:    02/21/22 1303   BP: 138/80   Pulse: 84   Temp: 98.2 °F (36.8 °C)     Weight: 87 kg (191 lb 12.8 oz)   Body mass index is 32.92 kg/m².      BP Readings from Last 3 Encounters:   02/21/22 138/80   01/24/22 (!) 142/82   01/13/22 (!) 144/84     Wt Readings from Last 3 Encounters:   02/21/22 1303 87 kg (191 lb 12.8 oz)   01/24/22 1306 86.6 kg (190 lb 14.7 oz)   01/13/22 1312 86.7 kg (191 lb 2.2 oz)            Physical Exam  Constitutional:       General: She is not in acute distress.  Eyes:      General: No scleral icterus.  Cardiovascular:      Rate and Rhythm: Normal rate and regular rhythm.      Heart sounds: Normal heart sounds.   Pulmonary:      Effort: Pulmonary effort is normal.      Breath sounds: Normal breath sounds.   Abdominal:      Palpations: Abdomen is soft.      Tenderness: There is no abdominal tenderness.   Musculoskeletal:         General: No swelling (trace).   Skin:     General: Skin is warm and dry.   Neurological:      Mental Status: She is alert. Mental status is at baseline.   Psychiatric:         Mood and Affect: Mood normal.         Behavior: Behavior normal.         Thought Content: Thought content normal.         Laboratory Reviewed: (Yes)  Lab Results   Component Value Date    WBC 7.55 01/24/2022    HGB 13.0 01/24/2022    HCT 41.1 01/24/2022     01/24/2022    CHOL 232 (H) 07/19/2021    TRIG 141 07/19/2021    HDL 48 07/19/2021    ALT 16 01/24/2022    AST 21 01/24/2022     01/24/2022    K 4.1 01/24/2022     01/24/2022    CREATININE 1.4 01/24/2022    BUN 28 (H) 01/24/2022    CO2 22 (L) 01/24/2022    TSH 1.412 01/24/2022    INR 1.0 02/12/2020    HGBA1C 5.9 (H) 03/08/2021           Health Maintenance  Health Maintenance Topics with due status: Not Due       Topic Last Completion Date     DEXA Scan 10/19/2020    Lipid Panel 07/19/2021     Health Maintenance Due   Topic Date Due    Shingles Vaccine (1 of 2) Never done    Pneumococcal Vaccines (Age 65+) (1 of 1 - PPSV23) Never done    Influenza Vaccine (1) 09/01/2021    TETANUS VACCINE  11/09/2021    COVID-19 Vaccine (4 - Booster for Pfizer series) 02/28/2022       Assessment:  Problem List Items Addressed This Visit        Psychiatric    Anxiety     Sx managed with alprazolam PRN.            Relevant Medications    ALPRAZolam (XANAX) 0.5 MG tablet       Ophtho    Keratitis sicca, bilateral     Followed by ophth                Pulmonary    Pulmonary granuloma       Cardiac/Vascular    HTN (hypertension) (Chronic)    Relevant Orders    Renal Function Panel       Endocrine    B12 deficiency - Primary    Relevant Orders    Vitamin B12    Methylmalonic Acid, Serum       Other    Insomnia    Relevant Medications    ALPRAZolam (XANAX) 0.5 MG tablet      Other Visit Diagnoses     Myalgia        Relevant Orders    C-reactive protein            Plan:  B12 deficiency  -     Vitamin B12; Future; Expected date: 02/21/2022  -     Methylmalonic Acid, Serum; Future; Expected date: 02/21/2022    Myalgia  -     C-reactive protein; Future; Expected date: 02/21/2022    Insomnia, unspecified type  -     ALPRAZolam (XANAX) 0.5 MG tablet; Take 1 tablet (0.5 mg total) by mouth nightly as needed for Anxiety.  Dispense: 30 tablet; Refill: 5    Anxiety  -     ALPRAZolam (XANAX) 0.5 MG tablet; Take 1 tablet (0.5 mg total) by mouth nightly as needed for Anxiety.  Dispense: 30 tablet; Refill: 5    Primary hypertension  -     Renal Function Panel; Future; Expected date: 02/21/2022    Keratitis sicca, bilateral    Pulmonary granuloma      -Patient's lab results were reviewed and discussed with patient  -Treatment options and alternatives were discussed with the patient. Patient expressed understanding. Patient was given the opportunity to ask questions and be an active  participant in their medical care. Patient had no further questions or concerns at this time.   -Documentation of patient's health and condition was obtained from family member who was present during visit.  -Patient is an overall moderate risk for health complications from their medical conditions.       Follow up: Follow up in about 3 months (around 5/21/2022).      After visit summary printed and given to patient upon discharge.  Patient goals and care plan are included in After visit summary.    Total medical decision making time was 38 min.  The following issues were discussed: The primary encounter diagnosis was B12 deficiency. Diagnoses of Myalgia, Insomnia, unspecified type, Anxiety, Primary hypertension, Keratitis sicca, bilateral, and Pulmonary granuloma were also pertinent to this visit.    Health maintenance needs, recent test results and goals of care discussed with pt and questions answered.

## 2022-02-21 ENCOUNTER — OFFICE VISIT (OUTPATIENT)
Dept: PRIMARY CARE CLINIC | Facility: CLINIC | Age: 87
End: 2022-02-21
Payer: MEDICARE

## 2022-02-21 VITALS
OXYGEN SATURATION: 94 % | TEMPERATURE: 98 F | HEIGHT: 64 IN | DIASTOLIC BLOOD PRESSURE: 80 MMHG | SYSTOLIC BLOOD PRESSURE: 138 MMHG | WEIGHT: 191.81 LBS | HEART RATE: 84 BPM | BODY MASS INDEX: 32.74 KG/M2

## 2022-02-21 DIAGNOSIS — F41.9 ANXIETY: ICD-10-CM

## 2022-02-21 DIAGNOSIS — M79.10 MYALGIA: ICD-10-CM

## 2022-02-21 DIAGNOSIS — J84.10 PULMONARY GRANULOMA: ICD-10-CM

## 2022-02-21 DIAGNOSIS — G47.00 INSOMNIA, UNSPECIFIED TYPE: ICD-10-CM

## 2022-02-21 DIAGNOSIS — E53.8 B12 DEFICIENCY: Primary | ICD-10-CM

## 2022-02-21 DIAGNOSIS — M35.01 KERATITIS SICCA, BILATERAL: ICD-10-CM

## 2022-02-21 DIAGNOSIS — I10 PRIMARY HYPERTENSION: ICD-10-CM

## 2022-02-21 PROCEDURE — 99999 PR PBB SHADOW E&M-EST. PATIENT-LVL IV: ICD-10-PCS | Mod: PBBFAC,HCNC,, | Performed by: NURSE PRACTITIONER

## 2022-02-21 PROCEDURE — 3288F PR FALLS RISK ASSESSMENT DOCUMENTED: ICD-10-PCS | Mod: CPTII,S$GLB,, | Performed by: NURSE PRACTITIONER

## 2022-02-21 PROCEDURE — 1101F PT FALLS ASSESS-DOCD LE1/YR: CPT | Mod: CPTII,S$GLB,, | Performed by: NURSE PRACTITIONER

## 2022-02-21 PROCEDURE — 99214 OFFICE O/P EST MOD 30 MIN: CPT | Mod: HCNC,S$GLB,, | Performed by: NURSE PRACTITIONER

## 2022-02-21 PROCEDURE — 1101F PR PT FALLS ASSESS DOC 0-1 FALLS W/OUT INJ PAST YR: ICD-10-PCS | Mod: CPTII,S$GLB,, | Performed by: NURSE PRACTITIONER

## 2022-02-21 PROCEDURE — 99999 PR PBB SHADOW E&M-EST. PATIENT-LVL IV: CPT | Mod: PBBFAC,HCNC,, | Performed by: NURSE PRACTITIONER

## 2022-02-21 PROCEDURE — 1159F PR MEDICATION LIST DOCUMENTED IN MEDICAL RECORD: ICD-10-PCS | Mod: CPTII,S$GLB,, | Performed by: NURSE PRACTITIONER

## 2022-02-21 PROCEDURE — 1159F MED LIST DOCD IN RCRD: CPT | Mod: CPTII,S$GLB,, | Performed by: NURSE PRACTITIONER

## 2022-02-21 PROCEDURE — 99214 PR OFFICE/OUTPT VISIT, EST, LEVL IV, 30-39 MIN: ICD-10-PCS | Mod: HCNC,S$GLB,, | Performed by: NURSE PRACTITIONER

## 2022-02-21 PROCEDURE — 99499 RISK ADDL DX/OHS AUDIT: ICD-10-PCS | Mod: S$PBB,,, | Performed by: NURSE PRACTITIONER

## 2022-02-21 PROCEDURE — 99499 UNLISTED E&M SERVICE: CPT | Mod: S$PBB,,, | Performed by: NURSE PRACTITIONER

## 2022-02-21 PROCEDURE — 3288F FALL RISK ASSESSMENT DOCD: CPT | Mod: CPTII,S$GLB,, | Performed by: NURSE PRACTITIONER

## 2022-02-21 RX ORDER — ALPRAZOLAM 0.5 MG/1
0.5 TABLET ORAL NIGHTLY PRN
Qty: 30 TABLET | Refills: 5 | Status: SHIPPED | OUTPATIENT
Start: 2022-02-21 | End: 2022-07-24

## 2022-02-24 ENCOUNTER — TELEPHONE (OUTPATIENT)
Dept: RHEUMATOLOGY | Facility: CLINIC | Age: 87
End: 2022-02-24
Payer: COMMERCIAL

## 2022-02-24 NOTE — TELEPHONE ENCOUNTER
"Spoke to patient and she said that she has been receiving messages on her portal stating that she is being offered "sooner appts" but the message doesn't say what provider or list the date and time.    "

## 2022-02-24 NOTE — TELEPHONE ENCOUNTER
----- Message from Opal Velez sent at 2/24/2022  9:06 AM CST -----  Patient need to speak to nurse regarding her scheduled jaun. Call back number [phone]. Mynor

## 2022-03-11 ENCOUNTER — TELEPHONE (OUTPATIENT)
Dept: PRIMARY CARE CLINIC | Facility: CLINIC | Age: 87
End: 2022-03-11
Payer: COMMERCIAL

## 2022-03-11 NOTE — TELEPHONE ENCOUNTER
Patient called in to get her urine results that was collected by Dr. Galindo office. She stated that it was collected last week but she was never contacted with her results. She says that Dr. Mendoza is out until mid nect week?   Lov: 02/21/2022

## 2022-03-14 NOTE — TELEPHONE ENCOUNTER
Patient informed per Heather that no urine results were seen from Dr. Galindo office. Patient verbally understood the information.

## 2022-03-16 ENCOUNTER — OFFICE VISIT (OUTPATIENT)
Dept: PRIMARY CARE CLINIC | Facility: CLINIC | Age: 87
End: 2022-03-16
Payer: MEDICARE

## 2022-03-16 VITALS
BODY MASS INDEX: 32.63 KG/M2 | HEIGHT: 64 IN | SYSTOLIC BLOOD PRESSURE: 144 MMHG | TEMPERATURE: 99 F | HEART RATE: 75 BPM | OXYGEN SATURATION: 93 % | WEIGHT: 191.13 LBS | DIASTOLIC BLOOD PRESSURE: 68 MMHG

## 2022-03-16 DIAGNOSIS — Z94.7 HISTORY OF CORNEA TRANSPLANT: ICD-10-CM

## 2022-03-16 DIAGNOSIS — Z23 ENCOUNTER FOR IMMUNIZATION: Primary | ICD-10-CM

## 2022-03-16 PROCEDURE — 1101F PT FALLS ASSESS-DOCD LE1/YR: CPT | Mod: CPTII,S$GLB,, | Performed by: NURSE PRACTITIONER

## 2022-03-16 PROCEDURE — 99215 OFFICE O/P EST HI 40 MIN: CPT | Mod: S$GLB,,, | Performed by: NURSE PRACTITIONER

## 2022-03-16 PROCEDURE — 1159F MED LIST DOCD IN RCRD: CPT | Mod: CPTII,S$GLB,, | Performed by: NURSE PRACTITIONER

## 2022-03-16 PROCEDURE — 3288F PR FALLS RISK ASSESSMENT DOCUMENTED: ICD-10-PCS | Mod: CPTII,S$GLB,, | Performed by: NURSE PRACTITIONER

## 2022-03-16 PROCEDURE — 1126F AMNT PAIN NOTED NONE PRSNT: CPT | Mod: CPTII,S$GLB,, | Performed by: NURSE PRACTITIONER

## 2022-03-16 PROCEDURE — G0009 ADMIN PNEUMOCOCCAL VACCINE: HCPCS | Mod: S$GLB,,, | Performed by: NURSE PRACTITIONER

## 2022-03-16 PROCEDURE — 90732 PNEUMOCOCCAL POLYSACCHARIDE VACCINE 23-VALENT =>2YO SQ IM: ICD-10-PCS | Mod: S$GLB,,, | Performed by: NURSE PRACTITIONER

## 2022-03-16 PROCEDURE — G0009 PNEUMOCOCCAL POLYSACCHARIDE VACCINE 23-VALENT =>2YO SQ IM: ICD-10-PCS | Mod: S$GLB,,, | Performed by: NURSE PRACTITIONER

## 2022-03-16 PROCEDURE — 99215 PR OFFICE/OUTPT VISIT, EST, LEVL V, 40-54 MIN: ICD-10-PCS | Mod: S$GLB,,, | Performed by: NURSE PRACTITIONER

## 2022-03-16 PROCEDURE — 99999 PR PBB SHADOW E&M-EST. PATIENT-LVL IV: CPT | Mod: PBBFAC,,, | Performed by: NURSE PRACTITIONER

## 2022-03-16 PROCEDURE — 99999 PR PBB SHADOW E&M-EST. PATIENT-LVL IV: ICD-10-PCS | Mod: PBBFAC,,, | Performed by: NURSE PRACTITIONER

## 2022-03-16 PROCEDURE — 1160F RVW MEDS BY RX/DR IN RCRD: CPT | Mod: CPTII,S$GLB,, | Performed by: NURSE PRACTITIONER

## 2022-03-16 PROCEDURE — 1101F PR PT FALLS ASSESS DOC 0-1 FALLS W/OUT INJ PAST YR: ICD-10-PCS | Mod: CPTII,S$GLB,, | Performed by: NURSE PRACTITIONER

## 2022-03-16 PROCEDURE — 90732 PPSV23 VACC 2 YRS+ SUBQ/IM: CPT | Mod: S$GLB,,, | Performed by: NURSE PRACTITIONER

## 2022-03-16 PROCEDURE — 1160F PR REVIEW ALL MEDS BY PRESCRIBER/CLIN PHARMACIST DOCUMENTED: ICD-10-PCS | Mod: CPTII,S$GLB,, | Performed by: NURSE PRACTITIONER

## 2022-03-16 PROCEDURE — 3288F FALL RISK ASSESSMENT DOCD: CPT | Mod: CPTII,S$GLB,, | Performed by: NURSE PRACTITIONER

## 2022-03-16 PROCEDURE — 1126F PR PAIN SEVERITY QUANTIFIED, NO PAIN PRESENT: ICD-10-PCS | Mod: CPTII,S$GLB,, | Performed by: NURSE PRACTITIONER

## 2022-03-16 PROCEDURE — 1159F PR MEDICATION LIST DOCUMENTED IN MEDICAL RECORD: ICD-10-PCS | Mod: CPTII,S$GLB,, | Performed by: NURSE PRACTITIONER

## 2022-03-16 RX ORDER — LOTEPREDNOL ETABONATE 5 MG/ML
1 SUSPENSION/ DROPS OPHTHALMIC DAILY
Qty: 5 ML | Refills: 11 | Status: ON HOLD | OUTPATIENT
Start: 2022-03-16 | End: 2022-07-14 | Stop reason: HOSPADM

## 2022-03-16 NOTE — PROGRESS NOTES
Shirley Metz  03/16/2022  [id number]    Heather Miller NP  Patient Care Team:  Heather Miller NP as PCP - General (Family Medicine)  Wilbert Ware MD as Consulting Physician (Ophthalmology)  Rajinder Quintanilla MD as Consulting Physician (Ophthalmology)  Suzan Gregorio PA-C as Physician Assistant (Rheumatology)  Rosario Valadez MD as Consulting Physician (Dermatology)  Trevon Ramirez MD as Consulting Physician (Cardiology)  Amish Mendoza MD (Pulmonary Disease)  Jaquan Choi MD as Consulting Physician (Vascular Surgery)  Debbie Choi MD (Inactive) as Consulting Physician (Gynecology)  Emmanuel Fish MD as Consulting Physician (Hand Surgery)  PAXTON Chaidez Jr., MD as Consulting Physician (Orthopedic Surgery)        [facility] Primary Care Note      Chief Complaint:  No chief complaint on file.      History of Present Illness:  HPI    Completed lung bx last month at  Clinic. Very uncomfortable during procedure.   Benign/chronic inflammation.     Checking urine for fungal infection, negative.   Also noted mild hematuria.     Saw Rachel Nava about 10 years ago.   Not interested in follow up with pulmonology anymore unles sx worse.   Wants serial CTs instead.     Severe allergy to erythromycin.     Trouble with diuretic. Taking Lasix 40 mg Q 4 days. Not helping. Still with ankle swelling in PM. Sx much better with HCTZ. Taking Lasix 20 mg in between as needed. Has cardiology appt next week. Mouth dry, lips cracking. Requiring more laxatives. Attributes to diuretic.     BP okay at home. Has been stable, typically -140.    Breathing okay.     Occasional AM palpitations, very brief, no other sx. Cards appt next week.             Review of Systems   Constitutional: Negative for chills and fever.   Respiratory: Negative for cough, sputum production, shortness of breath and wheezing (occasional, brief).    Cardiovascular: Positive for palpitations and leg swelling. Negative for  chest pain.   Gastrointestinal: Negative for abdominal pain, heartburn, nausea and vomiting.   Genitourinary: Negative for dysuria.   Neurological: Negative for dizziness and headaches.   Psychiatric/Behavioral: Negative for depression.         The following were reviewed: Active problem list, medication list, allergies, family history, social history, and Health Maintenance.     History:  Past Medical History:   Diagnosis Date    Anemia 11/29/2018    Anxiety 11/28/2017    Arthritis     Atrial fibrillation with RVR 10/9/2019    Back pain     Basal cell carcinoma 03/29/2018    left mid back    Chronic diastolic congestive heart failure 10/15/2019    CKD (chronic kidney disease) stage 3, GFR 30-59 ml/min 8/8/2016    Colon polyps     reported per patient.     Coronary artery calcification 10/5/2021    Fuchs' corneal dystrophy     General anesthetics causing adverse effect in therapeutic use     woke up during surgery and gagged on ET tube    Glaucoma     Glaucoma     Heart failure with preserved left ventricular function (HFpEF) 7/24/2018    Hyperlipidemia     Hypertension     Macular degeneration dry    Obesity     PVD (peripheral vascular disease) 4/26/2021    PVD (peripheral vascular disease) 4/26/2021    Squamous cell carcinoma 01/08/2019    left shoulder    Stenosis of carotid artery 10/5/2021    Trouble in sleeping     Type 2 diabetes mellitus without complication, without long-term current use of insulin 2/24/2021    Urinary tract infection      Past Surgical History:   Procedure Laterality Date    ADENOIDECTOMY  1938    BREAST BIOPSY Left     1997. benign    BREAST CYST EXCISION Left 1997 approx    CARPAL TUNNEL RELEASE Right 2012 approx    CATARACT EXTRACTION Bilateral 1994    CHOLECYSTECTOMY  1975    CORNEAL TRANSPLANT Bilateral 08/2015 8/2015 - left; Right 4/2016    EYE SURGERY      Fuch's Corneal dystrophy - had 2nd operation with Dr. Quintanilla    EYE SURGERY      Cataract  wIOL    left thumb Left 2011    removed cuboid for arthritis    REVERSE TOTAL SHOULDER ARTHROPLASTY Left 8/21/2018    Procedure: ARTHROPLASTY, SHOULDER, TOTAL, REVERSE;  Surgeon: Solomon Lujan MD;  Location: [facility] OR;  Service: Orthopedics;  Laterality: Left;  WITH BICEP TENODESIS    SKIN CANCER EXCISION Left 2017    BCC removal by Dr. Valadez    SLT- OS (aka TRABECULOPLASTY) Left 05/11/2011    repeat 3/14/12    TENOTOMY Left 8/21/2018    Procedure: TENOTOMY;  Surgeon: Solomon Lujan MD;  Location: [facility] OR;  Service: Orthopedics;  Laterality: Left;    TONSILLECTOMY  1938    TREATMENT OF CARDIAC ARRHYTHMIA N/A 10/10/2019    Procedure: CARDIOVERSION;  Surgeon: Pete Durán MD;  Location: [facility] CATH LAB;  Service: Cardiology;  Laterality: N/A;    TREATMENT OF CARDIAC ARRHYTHMIA N/A 12/6/2019    Procedure: CARDIOVERSION;  Surgeon: Samy Castillo MD;  Location: [facility] CATH LAB;  Service: Cardiology;  Laterality: N/A;     Family History   Problem Relation Age of Onset    Heart disease Mother     Hypertension Mother     Cancer Father 88        sarcoma( ?Kaposi's ) on leg    Deep vein thrombosis Neg Hx     Ovarian cancer Neg Hx     Breast cancer Neg Hx     Kidney disease Neg Hx     Strabismus Neg Hx     Retinal detachment Neg Hx     Macular degeneration Neg Hx     Glaucoma Neg Hx     Blindness Neg Hx     Amblyopia Neg Hx     Eczema Neg Hx     Lupus Neg Hx     Melanoma Neg Hx     Psoriasis Neg Hx     Diabetes Neg Hx     Stroke Neg Hx     Mental retardation Neg Hx     Mental illness Neg Hx     Hyperlipidemia Neg Hx     COPD Neg Hx     Asthma Neg Hx     Depression Neg Hx     Alcohol abuse Neg Hx     Drug abuse Neg Hx      Social History     Socioeconomic History    Marital status:     Number of children: 3   Tobacco Use    Smoking status: Never Smoker    Smokeless tobacco: Never Used    Tobacco comment: history of passive smoking from her ex-   Substance and Sexual  Activity    Alcohol use: Yes     Alcohol/week: 2.0 standard drinks     Types: 2 Standard drinks or equivalent per week     Comment: occ    Drug use: No    Sexual activity: Not Currently     Partners: Male     Birth control/protection: Post-menopausal     Comment: discussed protection for STD's   Other Topics Concern    Are you pregnant or think you may be? No    Breast-feeding No   Social History Narrative     x 2,  x 1. Retired artist - previously doing jewelry/wall pieces; ; lives in [facility]; has 3 sons - 52( lives in [place], 54, and 56;exercises minimally - limited due to back issues. Still drives. Does have a Living Will.     Social Determinants of Health     Financial Resource Strain: Low Risk     Difficulty of Paying Living Expenses: Not hard at all   Food Insecurity: No Food Insecurity    Worried About Running Out of Food in the Last Year: Never true    Ran Out of Food in the Last Year: Never true   Transportation Needs: No Transportation Needs    Lack of Transportation (Medical): No    Lack of Transportation (Non-Medical): No   Physical Activity: Insufficiently Active    Days of Exercise per Week: 3 days    Minutes of Exercise per Session: 10 min   Stress: No Stress Concern Present    Feeling of Stress : Not at all   Social Connections: Moderately Isolated    Frequency of Communication with Friends and Family: Three times a week    Frequency of Social Gatherings with Friends and Family: Three times a week    Attends Evangelical Services: Never    Active Member of Clubs or Organizations: Yes    Attends Club or Organization Meetings: More than 4 times per year    Marital Status:    Housing Stability: Low Risk     Unable to Pay for Housing in the Last Year: No    Number of Places Lived in the Last Year: 1    Unstable Housing in the Last Year: No     Patient Active Problem List   Diagnosis    HTN (hypertension)    Mixed hyperlipidemia     Primary osteoarthritis involving multiple joints    Macular degeneration - Both Eyes    Fuch's endothelial dystrophy - Both Eyes    Lens replaced by other means    COAG (chronic open-angle glaucoma) - Both Eyes    Blepharitis, unspecified - Both Eyes    Abnormal ECG    PVC's (premature ventricular contractions)    Other microscopic hematuria    LVH (left ventricular hypertrophy) due to hypertensive disease    Osteopenia of hip    Calcification of aorta    Neuropathy    Unequal blood pressures in paired extremities    Medication side effects    History of cornea transplant    Pseudophakia    Insomnia    Anxiety    Pre-diabetes    Obesity (BMI 30.0-34.9)    Bilateral shoulder region arthritis    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    Anemia    Thrombus of left atrial appendage    PAF (paroxysmal atrial fibrillation)    Elevated liver enzymes    Chronic anticoagulation    Keratitis sicca, bilateral    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    PAD (peripheral artery disease)    Statin intolerance    Pulmonary nodules/lesions, multiple    Spinal stenosis at L4-L5 level    Pulmonary granuloma    Lumbar spondylosis    Bilateral recurrent inguinal hernia without obstruction or gangrene    LLQ abdominal pain    Pain in left leg    Cellulitis, face    Rectal abnormality    Stenosis of carotid artery    Carotid artery calcification, bilateral    B12 deficiency     Review of patient's allergies indicates:   Allergen Reactions    Carvedilol Swelling     lips  lips  Other reaction(s): swelling    Doxycycline calcium Other (See Comments)     Weakness nausea   sob  Other reaction(s): weakness, nausea, SOB    Erythromycin Other (See Comments)     Complete weakness/could not walk    Gadolinium-containing contrast media Other (See Comments)     angioedema  Other reaction(s): Angioedema    Hydrocodone-acetaminophen       wheezing  wheezing  wheezing  Other reaction(s): wheezing    Inveltys [loteprednol etabonate] Palpitations and Other (See Comments)     Patient stated bp went up.    Labetalol Shortness Of Breath, Nausea Only and Other (See Comments)     Palpitations, LE weakness  Other reaction(s): SOB, palpitations, weakness    Loratadine Other (See Comments)     Double vision/spots  Other reaction(s): double vision    Macrolide antibiotics Other (See Comments)     Complete weakness/could not walk  Complete weakness/could not walk  Complete weakness/could not walk  Complete weakness/could not walk  Other reaction(s): complete weakness    Moxifloxacin Other (See Comments)     Hives   muscle soreness  Other reaction(s): hives, muscle soreness    Naproxen      wheezing  wheezing  wheezing  Other reaction(s): wheezing    Statins-hmg-coa reductase inhibitors Other (See Comments)     Severe Muscle weakness  Muscle weakness  Severe Muscle weakness  Other reaction(s): severe muscle weakness    Timolol Other (See Comments)     Severe muscle weakness, eye problems.  Other reaction(s): severe muscle weakness    Verapamil Diarrhea     Severe diarrhea.  Other reaction(s): diarrhea    Amlodipine      Myalgias    Other reaction(s): myalgian    Fenofibrate      Causes muscle weakness  Other reaction(s): muscle weakness    Hydralazine      Other reaction(s): muscle tremors    Hydralazine analogues      Muscle tremors/aches      Isosorbide      Tolerates Imdur    Loteprednol      Other reaction(s): increased BP    Adhesive Rash     Clonidine patch - 2 mfg - contact dermatitis    Codeine Diarrhea and Other (See Comments)     Loss of balance   Other reaction(s): loss of balance    Doxazosin Palpitations     Other reaction(s): palpitations    Fexofenadine Other (See Comments)     Double vision/spots   Other reaction(s): double vision    Hydrocortisone (bulk) Other (See Comments)     Only suppository/ caused muscle weakness     Latanoprost Other (See Comments)     Muscle weakness  Other reaction(s): muscle weakness    Olopatadine Other (See Comments)     Muscle weakness  Other reaction(s): muscle weakness    Prednisone Anxiety       Medications:  Current Outpatient Medications on File Prior to Visit   Medication Sig Dispense Refill    acetaminophen (TYLENOL) 500 MG tablet Take 500 mg by mouth daily as needed. Taking 1 to 3      ALPRAZolam (XANAX) 0.5 MG tablet Take 1 tablet (0.5 mg total) by mouth nightly as needed for Anxiety. 30 tablet 5    apixaban (ELIQUIS) 2.5 mg Tab Take 1 tablet (2.5 mg total) by mouth 2 (two) times daily. 180 tablet 3    b complex vitamins capsule Take 1 capsule by mouth once daily.      cholecalciferol, vitamin D3, (VITAMIN D3) 50 mcg (2,000 unit) Cap Take 1 capsule by mouth once daily.      coenzyme Q10 200 mg capsule Take 400 mg by mouth once daily.       fish oil-omega-3 fatty acids 300-1,000 mg capsule Take 2 g by mouth 2 (two) times daily.       furosemide (LASIX) 40 MG tablet Take 1 tablet (40 mg total) by mouth daily as needed (edema, swelling, fluid). 60 tablet 3    isosorbide mononitrate (IMDUR) 30 MG 24 hr tablet Take 1 tablet (30 mg total) by mouth every evening. 30 tablet 3    losartan (COZAAR) 50 MG tablet Take 1 tablet (50 mg total) by mouth 2 (two) times daily. 90 tablet 1    metoprolol tartrate (LOPRESSOR) 25 MG tablet TAKE 0.5 TABLETS (12.5 MG TOTAL) BY MOUTH 2 (TWO) TIMES DAILY. 90 tablet 0    netarsudiL (RHOPRESSA) 0.02 % ophthalmic solution Place 1 drop into the left eye every evening. 2.5 mL 12    POLYETHYLENE GLYCOL 3350 (MIRALAX ORAL) Take 17 g by mouth 2 (two) times a day. Taking BID      travoprost (TRAVATAN Z) 0.004 % ophthalmic solution PLACE 1 DROP INTO THE LEFT EYE EVERY EVENING. 2.5 mL 10    [DISCONTINUED] loteprednol (LOTEMAX) 0.5 % ophthalmic suspension Place 1 drop into both eyes once daily. (Patient taking differently: Place 1 drop into both eyes once daily. Right  eye only) 10 mL 3     Current Facility-Administered Medications on File Prior to Visit   Medication Dose Route Frequency Provider Last Rate Last Admin    sodium chloride 0.9% flush 3 mL  3 mL Intravenous PRN Steve Galarza PA-C           Medications have been reviewed and reconciled with patient at visit today.    Barriers to medications present (no )    Adverse reactions to current medications (no)      Exam:  Vitals:    03/16/22 1307   BP: (!) 144/68   Pulse: 75   Temp: 99.2 °F (37.3 °C)     Weight: 86.7 kg (191 lb 2.2 oz)   Body mass index is 32.81 kg/m².      BP Readings from Last 3 Encounters:   03/16/22 (!) 144/68   02/21/22 138/80   01/24/22 (!) 142/82     Wt Readings from Last 3 Encounters:   03/16/22 1307 86.7 kg (191 lb 2.2 oz)   02/21/22 1303 87 kg (191 lb 12.8 oz)   01/24/22 1306 86.6 kg (190 lb 14.7 oz)            Physical Exam  Constitutional:       General: She is not in acute distress.  HENT:      Mouth/Throat:      Mouth: Mucous membranes are moist.   Eyes:      General: No scleral icterus.  Cardiovascular:      Rate and Rhythm: Normal rate and regular rhythm.      Heart sounds: Normal heart sounds.   Pulmonary:      Effort: Pulmonary effort is normal. No respiratory distress.      Breath sounds: Normal breath sounds.   Musculoskeletal:         General: Swelling (trace BLE) present.      Cervical back: Neck supple.   Skin:     General: Skin is warm and dry.   Neurological:      Mental Status: She is alert. Mental status is at baseline.      Gait: Gait normal.   Psychiatric:         Mood and Affect: Mood normal.         Behavior: Behavior normal.         Thought Content: Thought content normal.         Laboratory Reviewed: (Yes)  Lab Results   Component Value Date    WBC 7.55 01/24/2022    HGB 13.0 01/24/2022    HCT 41.1 01/24/2022     01/24/2022    CHOL 232 (H) 07/19/2021    TRIG 141 07/19/2021    HDL 48 07/19/2021    ALT 16 01/24/2022    AST 21 01/24/2022     01/24/2022    K 4.1  01/24/2022     01/24/2022    CREATININE 1.4 01/24/2022    BUN 28 (H) 01/24/2022    CO2 22 (L) 01/24/2022    TSH 1.412 01/24/2022    INR 1.0 02/12/2020    HGBA1C 5.9 (H) 03/08/2021           Health Maintenance  Health Maintenance Topics with due status: Not Due       Topic Last Completion Date    DEXA Scan 10/19/2020    Lipid Panel 07/19/2021     Health Maintenance Due   Topic Date Due    Shingles Vaccine (1 of 2) Never done    Influenza Vaccine (1) 09/01/2021    TETANUS VACCINE  11/09/2021    COVID-19 Vaccine (4 - Booster for Pfizer series) 02/28/2022       Assessment:  Problem List Items Addressed This Visit        Ophtho    History of cornea transplant    Relevant Medications    loteprednol (LOTEMAX) 0.5 % ophthalmic suspension      Other Visit Diagnoses     Encounter for immunization    -  Primary    Relevant Orders    (In Office Administered) Pneumococcal Polysaccharide Vaccine (23 Valent) (SQ/IM) (Completed)    Influenza - High Dose (65+) (PF) (IM)            Plan:  Encounter for immunization  -     (In Office Administered) Pneumococcal Polysaccharide Vaccine (23 Valent) (SQ/IM)  -     Influenza - High Dose (65+) (PF) (IM); Future; Expected date: 03/23/2022    History of cornea transplant  -     loteprednol (LOTEMAX) 0.5 % ophthalmic suspension; Place 1 drop into the right eye once daily. Right eye only  Dispense: 5 mL; Refill: 11      -Patient's lab results were reviewed and discussed with patient  -Treatment options and alternatives were discussed with the patient. Patient expressed understanding. Patient was given the opportunity to ask questions and be an active participant in their medical care. Patient had no further questions or concerns at this time.   -Documentation of patient's health and condition was obtained from family member who was present during visit.  -Patient is an overall moderate risk for health complications from their medical conditions.       Follow up: Follow up in about 6  months (around 9/16/2022).      After visit summary printed and given to patient upon discharge.  Patient goals and care plan are included in After visit summary.    Total medical decision making time was 42 min.  The following issues were discussed: The primary encounter diagnosis was Encounter for immunization. A diagnosis of History of cornea transplant was also pertinent to this visit.    Health maintenance needs, recent test results and goals of care discussed with pt and questions answered.

## 2022-03-24 ENCOUNTER — OFFICE VISIT (OUTPATIENT)
Dept: CARDIOLOGY | Facility: CLINIC | Age: 87
End: 2022-03-24
Payer: MEDICARE

## 2022-03-24 ENCOUNTER — IMMUNIZATION (OUTPATIENT)
Dept: INTERNAL MEDICINE | Facility: CLINIC | Age: 87
End: 2022-03-24
Payer: MEDICARE

## 2022-03-24 VITALS
HEART RATE: 82 BPM | BODY MASS INDEX: 32.82 KG/M2 | OXYGEN SATURATION: 93 % | HEIGHT: 64 IN | SYSTOLIC BLOOD PRESSURE: 162 MMHG | WEIGHT: 192.25 LBS | DIASTOLIC BLOOD PRESSURE: 76 MMHG

## 2022-03-24 DIAGNOSIS — I73.9 PAD (PERIPHERAL ARTERY DISEASE): ICD-10-CM

## 2022-03-24 DIAGNOSIS — E78.2 MIXED HYPERLIPIDEMIA: ICD-10-CM

## 2022-03-24 DIAGNOSIS — N19 HYPERTENSIVE HEART AND RENAL DISEASE WITH BOTH (CONGESTIVE) HEART FAILURE AND RENAL FAILURE: ICD-10-CM

## 2022-03-24 DIAGNOSIS — N18.32 STAGE 3B CHRONIC KIDNEY DISEASE: ICD-10-CM

## 2022-03-24 DIAGNOSIS — I10 PRIMARY HYPERTENSION: Primary | ICD-10-CM

## 2022-03-24 DIAGNOSIS — I65.29 STENOSIS OF CAROTID ARTERY, UNSPECIFIED LATERALITY: ICD-10-CM

## 2022-03-24 DIAGNOSIS — I70.0 CALCIFICATION OF AORTA: ICD-10-CM

## 2022-03-24 DIAGNOSIS — R94.31 ABNORMAL ECG: ICD-10-CM

## 2022-03-24 DIAGNOSIS — I13.2 HYPERTENSIVE HEART AND RENAL DISEASE WITH BOTH (CONGESTIVE) HEART FAILURE AND RENAL FAILURE: ICD-10-CM

## 2022-03-24 DIAGNOSIS — I11.9 HYPERTENSIVE LEFT VENTRICULAR HYPERTROPHY, WITHOUT HEART FAILURE: ICD-10-CM

## 2022-03-24 DIAGNOSIS — I49.3 PVC'S (PREMATURE VENTRICULAR CONTRACTIONS): ICD-10-CM

## 2022-03-24 DIAGNOSIS — I65.23 CAROTID ARTERY CALCIFICATION, BILATERAL: ICD-10-CM

## 2022-03-24 DIAGNOSIS — I48.0 PAF (PAROXYSMAL ATRIAL FIBRILLATION): ICD-10-CM

## 2022-03-24 PROCEDURE — 3288F FALL RISK ASSESSMENT DOCD: CPT | Mod: CPTII,S$GLB,, | Performed by: INTERNAL MEDICINE

## 2022-03-24 PROCEDURE — 99214 PR OFFICE/OUTPT VISIT, EST, LEVL IV, 30-39 MIN: ICD-10-PCS | Mod: S$GLB,,, | Performed by: INTERNAL MEDICINE

## 2022-03-24 PROCEDURE — 1126F PR PAIN SEVERITY QUANTIFIED, NO PAIN PRESENT: ICD-10-PCS | Mod: CPTII,S$GLB,, | Performed by: INTERNAL MEDICINE

## 2022-03-24 PROCEDURE — 3288F PR FALLS RISK ASSESSMENT DOCUMENTED: ICD-10-PCS | Mod: CPTII,S$GLB,, | Performed by: INTERNAL MEDICINE

## 2022-03-24 PROCEDURE — 99999 PR PBB SHADOW E&M-EST. PATIENT-LVL IV: CPT | Mod: PBBFAC,,, | Performed by: INTERNAL MEDICINE

## 2022-03-24 PROCEDURE — 99499 UNLISTED E&M SERVICE: CPT | Mod: S$PBB,,, | Performed by: INTERNAL MEDICINE

## 2022-03-24 PROCEDURE — 1160F RVW MEDS BY RX/DR IN RCRD: CPT | Mod: CPTII,S$GLB,, | Performed by: INTERNAL MEDICINE

## 2022-03-24 PROCEDURE — 1159F MED LIST DOCD IN RCRD: CPT | Mod: CPTII,S$GLB,, | Performed by: INTERNAL MEDICINE

## 2022-03-24 PROCEDURE — 90694 FLU VACCINE - QUADRIVALENT - ADJUVANTED: ICD-10-PCS | Mod: S$GLB,,, | Performed by: NURSE PRACTITIONER

## 2022-03-24 PROCEDURE — 99214 OFFICE O/P EST MOD 30 MIN: CPT | Mod: S$GLB,,, | Performed by: INTERNAL MEDICINE

## 2022-03-24 PROCEDURE — 99499 RISK ADDL DX/OHS AUDIT: ICD-10-PCS | Mod: S$PBB,,, | Performed by: INTERNAL MEDICINE

## 2022-03-24 PROCEDURE — 1101F PR PT FALLS ASSESS DOC 0-1 FALLS W/OUT INJ PAST YR: ICD-10-PCS | Mod: CPTII,S$GLB,, | Performed by: INTERNAL MEDICINE

## 2022-03-24 PROCEDURE — 1101F PT FALLS ASSESS-DOCD LE1/YR: CPT | Mod: CPTII,S$GLB,, | Performed by: INTERNAL MEDICINE

## 2022-03-24 PROCEDURE — G0008 ADMIN INFLUENZA VIRUS VAC: HCPCS | Mod: S$GLB,,, | Performed by: NURSE PRACTITIONER

## 2022-03-24 PROCEDURE — 90694 VACC AIIV4 NO PRSRV 0.5ML IM: CPT | Mod: S$GLB,,, | Performed by: NURSE PRACTITIONER

## 2022-03-24 PROCEDURE — 99999 PR PBB SHADOW E&M-EST. PATIENT-LVL IV: ICD-10-PCS | Mod: PBBFAC,,, | Performed by: INTERNAL MEDICINE

## 2022-03-24 PROCEDURE — G0008 FLU VACCINE - QUADRIVALENT - ADJUVANTED: ICD-10-PCS | Mod: S$GLB,,, | Performed by: NURSE PRACTITIONER

## 2022-03-24 PROCEDURE — 1126F AMNT PAIN NOTED NONE PRSNT: CPT | Mod: CPTII,S$GLB,, | Performed by: INTERNAL MEDICINE

## 2022-03-24 PROCEDURE — 1160F PR REVIEW ALL MEDS BY PRESCRIBER/CLIN PHARMACIST DOCUMENTED: ICD-10-PCS | Mod: CPTII,S$GLB,, | Performed by: INTERNAL MEDICINE

## 2022-03-24 PROCEDURE — 1159F PR MEDICATION LIST DOCUMENTED IN MEDICAL RECORD: ICD-10-PCS | Mod: CPTII,S$GLB,, | Performed by: INTERNAL MEDICINE

## 2022-03-24 RX ORDER — HYDROCHLOROTHIAZIDE 12.5 MG/1
12.5 TABLET ORAL DAILY
Qty: 90 TABLET | Refills: 1 | Status: SHIPPED | OUTPATIENT
Start: 2022-03-24 | End: 2022-04-25 | Stop reason: SDUPTHER

## 2022-03-24 NOTE — PROGRESS NOTES
Subjective:   Patient ID:  Shirley Metz is a 89 y.o. female who presents for cardiac consult of No chief complaint on file.      Shortness of Breath  Pertinent negatives include no leg swelling or rash.   Fatigue  Associated symptoms include fatigue. Pertinent negatives include no rash.   Hypertension  Associated symptoms include shortness of breath.     The patient came in today for cardiac consult of No chief complaint on file.    Shirley Metz is a 89 y.o. female pt with HTN, PAF on Eliquis, PVD, carotid artery disease,  HFpEF, preDM,  hyperlipidemia, palpitations, CKD presents today for follow-up CV eval.    5/1/18  She has significant side effects from many BP meds, could not tolerate Verapamil recently. BP has improved, but sometimes BP gets too low - occasionally 119/53.   She thinks her BP is too low under 130 systolic and wants to decrease some meds. Discussed we can half the clonidine patch and monitor. Will continue other meds for now.She has a good log with BPs daily. Overall BP is well controlled now. Tries to eat low salt diet for the most part but is at Nursing home and can't always control the diet. Other main issue is her arm pain due to shoulder pain will see pain management specialist Dr. Chin.     5/24/18  Has a lot of pain in both arms, will be seeing Dr. Chin and then may need surgery by Dr. Lujan. Reviewed BP log - mostly 130s-140s, once 96/58. Occasional palpitations - usually with waking up or sleeping.     6/11/18  Pt has been getting painful rash at site of clonidine. Also has an infection possibly at left shoulder where procedure happened for shoulders. She is also seeing surgery today for shoulder pain. Pt also feels very weak due to clonidine and has trouble getting up with 0.2 mg patch. Last night BP went up to 190/71 and took a clonidine .1 mg PO dose which improved BP. Bp mildly elevated today but also in a lot of pain.     7/24/18  Pt is scheduled for  shoulder surgery on shoulder Aug 21st. She has been getting accupuncture which has helped to walk. She has stopped clonidine patch is taking pills BID/TID. BP overall have been well controlled, 140-150s/50-60s. Otherwise feels ok.     11/13/18  She is s/p L shoulder surgery currently undergoing PT. Pt had reverse joint repair/surgery due to a cyst in the joint/bone. Has another month of rehab at least. BP overall has been in 130s/60s. No further dizziness, has stopped metoprolol.   ECG - NSR    6/27/19  She had some allergy to chemicals at [facility] while something was sprayed. She had sneezing, runny nose. She was taking echinacea and other herbal meds which helped. She then took Benadryl and accupuncture. BP has been up for a month. She was off metoprolol, doubled clonidine and still elevated. Discussed wants lower HCTZ but BP has been normal at home as well.   ECG - NSR    2/17/20  She was admitted for afib RVR. Patient was started on amiodarone drip and converted to NSR overnight. She was started on PO amiodarone and discharged home with instructions to follow up with cards outpatient.  She went to ER after DC for HTN urgency - she was given HCTZ.      3/25/21  Increased Imdur last visit. /78 today, HR 60s. She has been having more edema, not taking lasix as much.     5/6/21  BP 150s/70s. HR 66.  increased lasix to 40mg weekly PRN. She feels weak still with dyspnea. She had LE u/s and referred for Dr. Ya no angio now but will f/u.     6/17/21  BP elevated today 170s/60s. HR 60s. She has been having more problems with L leg. She has been having sharp L leg pain. Does not want to see pain management.     9/23/21  Lipids improved slightly from last year. BP is much better, has no further low BPs since lowering Imdur. She has been feeling better off amiodarone. She had an injection in tooth and had root canal which improved. She will see [facility]. Mendoza at [facility].     11/30/21  BP and HR well  controlled today. She had a dental infection had oral surgery since last visit, she could not chew much so ate ice cream and soft diet. She is off abx.   BP at home low 110/70s.   ECG - NSR, PACs    1/13/22  BP 140s/80s. HR 70s. She changed BP meds - took Imdur only evening, and stopped HCTZ.    3/24/22  BP is elevated 160s/70s. HR 80s. She had a lung biopsy at [facility] Jan 2022 - neg for cancer, scar noted. But sample size reported to be too small to r/o other causes of nodule - infection/amio/mold neg.     Patient feels no chest pain, no sob, no leg swelling, no PND,  no dizziness, no syncope, no CNS symptoms.    Patient is compliant with medications.    Carotid u/s 2/2020  Conclusion    · There is 20-39% right Internal Carotid Stenosis.  · There is 20-39% left Internal Carotid Stenosis.          LE u/s  · There is no evidence of a right lower extremity DVT.  · There is no evidence of a left lower extremity DVT.       CONCLUSIONS     1 - Normal left ventricular systolic function (EF 55-60%).     2 - Normal left ventricular diastolic function.     3 - Normal right ventricular systolic function .     4 - Mild to moderate mitral regurgitation.     5 - Concentric hypertrophy.     This document has been electronically    SIGNED BY: Pete Durán MD On: 10/09/2019 20:00    LE u/s  · 50-99% stenosis bilateral SFA with biphasic and monophasic waveforms  · Moderate to severe PAD BLE    Past Medical History:   Diagnosis Date    Anemia 11/29/2018    Anxiety 11/28/2017    Arthritis     Atrial fibrillation with RVR 10/9/2019    Back pain     Basal cell carcinoma 03/29/2018    left mid back    Chronic diastolic congestive heart failure 10/15/2019    CKD (chronic kidney disease) stage 3, GFR 30-59 ml/min 8/8/2016    Colon polyps     reported per patient.     Coronary artery calcification 10/5/2021    Fuchs' corneal dystrophy     General anesthetics causing adverse effect in therapeutic use     woke up during surgery and  gagged on ET tube    Glaucoma     Glaucoma     Heart failure with preserved left ventricular function (HFpEF) 7/24/2018    Hyperlipidemia     Hypertension     Macular degeneration dry    Obesity     PVD (peripheral vascular disease) 4/26/2021    PVD (peripheral vascular disease) 4/26/2021    Squamous cell carcinoma 01/08/2019    left shoulder    Stenosis of carotid artery 10/5/2021    Trouble in sleeping     Type 2 diabetes mellitus without complication, without long-term current use of insulin 2/24/2021    Urinary tract infection        Past Surgical History:   Procedure Laterality Date    ADENOIDECTOMY  1938    BREAST BIOPSY Left     1997. benign    BREAST CYST EXCISION Left 1997 approx    CARPAL TUNNEL RELEASE Right 2012 approx    CATARACT EXTRACTION Bilateral 1994    CHOLECYSTECTOMY  1975    CORNEAL TRANSPLANT Bilateral 08/2015 8/2015 - left; Right 4/2016    EYE SURGERY      Fuch's Corneal dystrophy - had 2nd operation with Dr. Quintanilla    EYE SURGERY      Cataract wIOL    left thumb Left 2011    removed cuboid for arthritis    REVERSE TOTAL SHOULDER ARTHROPLASTY Left 8/21/2018    Procedure: ARTHROPLASTY, SHOULDER, TOTAL, REVERSE;  Surgeon: Solomon Lujan MD;  Location: [facility] OR;  Service: Orthopedics;  Laterality: Left;  WITH BICEP TENODESIS    SKIN CANCER EXCISION Left 2017    BCC removal by Dr. Valadez    SLT- OS (aka TRABECULOPLASTY) Left 05/11/2011    repeat 3/14/12    TENOTOMY Left 8/21/2018    Procedure: TENOTOMY;  Surgeon: Solomon Lujan MD;  Location: [facility] OR;  Service: Orthopedics;  Laterality: Left;    TONSILLECTOMY  1938    TREATMENT OF CARDIAC ARRHYTHMIA N/A 10/10/2019    Procedure: CARDIOVERSION;  Surgeon: Pete Durán MD;  Location: [facility] CATH LAB;  Service: Cardiology;  Laterality: N/A;    TREATMENT OF CARDIAC ARRHYTHMIA N/A 12/6/2019    Procedure: CARDIOVERSION;  Surgeon: aSmy Castillo MD;  Location: [facility] CATH LAB;  Service: Cardiology;  Laterality: N/A;        Social History     Tobacco Use    Smoking status: Never Smoker    Smokeless tobacco: Never Used    Tobacco comment: history of passive smoking from her ex-   Substance Use Topics    Alcohol use: Yes     Alcohol/week: 2.0 standard drinks     Types: 2 Standard drinks or equivalent per week     Comment: occ    Drug use: No       Family History   Problem Relation Age of Onset    Heart disease Mother     Hypertension Mother     Cancer Father 88        sarcoma( ?[place]'s ) on leg    Deep vein thrombosis Neg Hx     Ovarian cancer Neg Hx     Breast cancer Neg Hx     Kidney disease Neg Hx     Strabismus Neg Hx     Retinal detachment Neg Hx     Macular degeneration Neg Hx     Glaucoma Neg Hx     Blindness Neg Hx     Amblyopia Neg Hx     Eczema Neg Hx     Lupus Neg Hx     Melanoma Neg Hx     Psoriasis Neg Hx     Diabetes Neg Hx     Stroke Neg Hx     Mental retardation Neg Hx     Mental illness Neg Hx     Hyperlipidemia Neg Hx     COPD Neg Hx     Asthma Neg Hx     Depression Neg Hx     Alcohol abuse Neg Hx     Drug abuse Neg Hx        Patient's Medications   New Prescriptions    No medications on file   Previous Medications    ACETAMINOPHEN (TYLENOL) 500 MG TABLET    Take 500 mg by mouth daily as needed. Taking 1 to 3    ALPRAZOLAM (XANAX) 0.5 MG TABLET    Take 1 tablet (0.5 mg total) by mouth nightly as needed for Anxiety.    APIXABAN (ELIQUIS) 2.5 MG TAB    Take 1 tablet (2.5 mg total) by mouth 2 (two) times daily.    B COMPLEX VITAMINS CAPSULE    Take 1 capsule by mouth once daily.    CHOLECALCIFEROL, VITAMIN D3, (VITAMIN D3) 50 MCG (2,000 UNIT) CAP    Take 1 capsule by mouth once daily.    COENZYME Q10 200 MG CAPSULE    Take 400 mg by mouth once daily.     FISH OIL-OMEGA-3 FATTY ACIDS 300-1,000 MG CAPSULE    Take 2 g by mouth 2 (two) times daily.     FUROSEMIDE (LASIX) 40 MG TABLET    Take 1 tablet (40 mg total) by mouth daily as needed (edema, swelling, fluid).    ISOSORBIDE  MONONITRATE (IMDUR) 30 MG 24 HR TABLET    Take 1 tablet (30 mg total) by mouth every evening.    LOSARTAN (COZAAR) 50 MG TABLET    Take 1 tablet (50 mg total) by mouth 2 (two) times daily.    LOTEPREDNOL (LOTEMAX) 0.5 % OPHTHALMIC SUSPENSION    Place 1 drop into the right eye once daily. Right eye only    METOPROLOL TARTRATE (LOPRESSOR) 25 MG TABLET    TAKE 0.5 TABLETS (12.5 MG TOTAL) BY MOUTH 2 (TWO) TIMES DAILY.    NETARSUDIL (RHOPRESSA) 0.02 % OPHTHALMIC SOLUTION    Place 1 drop into the left eye every evening.    POLYETHYLENE GLYCOL 3350 (MIRALAX ORAL)    Take 17 g by mouth 2 (two) times a day. Taking BID    TRAVOPROST (TRAVATAN Z) 0.004 % OPHTHALMIC SOLUTION    PLACE 1 DROP INTO THE LEFT EYE EVERY EVENING.   Modified Medications    No medications on file   Discontinued Medications    No medications on file       Review of Systems   Constitutional: Positive for fatigue.   HENT: Negative.    Eyes: Negative.    Respiratory: Positive for shortness of breath.    Cardiovascular: Negative for leg swelling.   Gastrointestinal: Negative.    Genitourinary: Negative.    Musculoskeletal: Positive for joint pain.   Skin: Negative for rash.   Neurological: Negative for dizziness.   Endo/Heme/Allergies: Negative.    Psychiatric/Behavioral: Negative.        Wt Readings from Last 3 Encounters:   03/16/22 86.7 kg (191 lb 2.2 oz)   02/21/22 87 kg (191 lb 12.8 oz)   01/24/22 86.6 kg (190 lb 14.7 oz)     Temp Readings from Last 3 Encounters:   03/16/22 99.2 °F (37.3 °C) (Tympanic)   02/21/22 98.2 °F (36.8 °C) (Tympanic)   01/24/22 98.9 °F (37.2 °C) (Tympanic)     BP Readings from Last 3 Encounters:   03/16/22 (!) 144/68   02/21/22 138/80   01/24/22 (!) 142/82     Pulse Readings from Last 3 Encounters:   03/16/22 75   02/21/22 84   01/24/22 (!) 59       LMP  (LMP Unknown)     Objective:   Physical Exam  Vitals and nursing note reviewed.   Constitutional:       General: She is not in acute distress.     Appearance: She is  well-developed. She is not diaphoretic.   HENT:      Head: Normocephalic and atraumatic.      Nose: Nose normal.   Eyes:      General: No scleral icterus.     Conjunctiva/sclera: Conjunctivae normal.   Neck:      Thyroid: No thyromegaly.      Vascular: No JVD.   Cardiovascular:      Rate and Rhythm: Normal rate and regular rhythm.      Heart sounds: S1 normal and S2 normal. No murmur heard.    No friction rub. No gallop. No S3 or S4 sounds.   Pulmonary:      Effort: Pulmonary effort is normal. No respiratory distress.      Breath sounds: Normal breath sounds. No stridor. No wheezing or rales.   Chest:      Chest wall: No tenderness.   Abdominal:      General: Bowel sounds are normal. There is no distension.      Palpations: Abdomen is soft. There is no mass.      Tenderness: There is no abdominal tenderness. There is no rebound.   Genitourinary:     Comments: Deferred  Musculoskeletal:         General: No tenderness or deformity. Normal range of motion.      Cervical back: Normal range of motion and neck supple.   Lymphadenopathy:      Cervical: No cervical adenopathy.   Skin:     General: Skin is warm and dry.      Coloration: Skin is not pale.      Findings: Erythema and rash (on anterior chest - 2x2 square of clonide) present.   Neurological:      Mental Status: She is alert and oriented to person, place, and time.      Motor: No abnormal muscle tone.      Coordination: Coordination normal.   Psychiatric:         Behavior: Behavior normal.         Thought Content: Thought content normal.         Judgment: Judgment normal.         Lab Results   Component Value Date     01/24/2022    K 4.1 01/24/2022     01/24/2022    CO2 22 (L) 01/24/2022    BUN 28 (H) 01/24/2022    CREATININE 1.4 01/24/2022     01/24/2022    HGBA1C 5.9 (H) 03/08/2021    MG 2.1 02/12/2020    AST 21 01/24/2022    ALT 16 01/24/2022    ALBUMIN 3.9 01/24/2022    PROT 7.7 01/24/2022    BILITOT 0.3 01/24/2022    WBC 7.55 01/24/2022     HGB 13.0 01/24/2022    HCT 41.1 01/24/2022     (H) 01/24/2022     01/24/2022    INR 1.0 02/12/2020    TSH 1.412 01/24/2022    CHOL 232 (H) 07/19/2021    HDL 48 07/19/2021    LDLCALC 155.8 07/19/2021    TRIG 141 07/19/2021     (H) 03/08/2021     Assessment:      1. Primary hypertension    2. Hypertensive left ventricular hypertrophy, without heart failure    3. Calcification of aorta    4. Mixed hyperlipidemia    5. PVC's (premature ventricular contractions)    6. PAD (peripheral artery disease)    7. Stenosis of carotid artery, unspecified laterality    8. PAF (paroxysmal atrial fibrillation)    9. Carotid artery calcification, bilateral    10. Abnormal ECG    11. 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 Stage 3b chronic kidney disease        Plan:   1. HTN  - elevated here normal at home   - adjust meds, Imdur, cont lasix 40 PRN; restart HCTZ daily   - pt has numerous side effects to almost all other BP meds - norvasc, coreg, verapamil, BB  - cont low salt diet  - increase Losartan to 50mg BID    2. HLD  - cont low manuel diet  - rec Repatha/Praluent but does not want now    3. HFpEF -  - cont low salt diet  - changed lasix to 40mg PRN    4. CKD 3-4  - cont to monitor  - needs to f/u nephro     5. PAF - in NSR   - stop amio due to possible side effects  - cont Eliquis with BB    6. Edema with PVD; carotid artery disease - mild  - cont to monitor   - LE venous and arterial u/s with GEOVANI - moderate PAD; neg for DVT  - f/u with Dr. Ya as needed     7. PreDm/ Obesity - BMI 33 - 192 lbs   - not on meds now  - rec low manuel diet and weight loss    8. Dyspnea  - will f/u with pulm - Dr. Mendoza at [facility]    - had lung biopsy at [facility] - neg for CA    Thank you for allowing me to participate in this patient's care. Please do not hesitate to contact me with any questions or concerns. Consult note has been forwarded to the referral physician.

## 2022-03-24 NOTE — PROGRESS NOTES
Pt presents to clinic for high dose influenza immunization.  Two pt identifiers used.  Pt verbalized understanding of appointment.  Immunization documented in chart.  Pt instructed to wait in lobby for 15 minutes after administration.  Pt tolerated well.

## 2022-04-03 ENCOUNTER — PATIENT OUTREACH (OUTPATIENT)
Dept: ADMINISTRATIVE | Facility: OTHER | Age: 87
End: 2022-04-03
Payer: COMMERCIAL

## 2022-04-04 NOTE — PROGRESS NOTES
Health Maintenance Due   Topic Date Due    Shingles Vaccine (1 of 2) Never done    TETANUS VACCINE  11/09/2021    COVID-19 Vaccine (4 - Booster for Pfizer series) 02/28/2022     Updates were requested from care everywhere.  Chart was reviewed for overdue Proactive [facility] Encounters (SEBAS) topics (CRS, Breast Cancer Screening, Eye exam)  Health Maintenance has been updated.  LINKS immunization registry triggered.  Immunizations were reconciled.

## 2022-04-06 ENCOUNTER — TELEPHONE (OUTPATIENT)
Dept: RHEUMATOLOGY | Facility: CLINIC | Age: 87
End: 2022-04-06
Payer: COMMERCIAL

## 2022-04-07 ENCOUNTER — TELEPHONE (OUTPATIENT)
Dept: RHEUMATOLOGY | Facility: CLINIC | Age: 87
End: 2022-04-07
Payer: COMMERCIAL

## 2022-04-07 ENCOUNTER — OFFICE VISIT (OUTPATIENT)
Dept: RHEUMATOLOGY | Facility: CLINIC | Age: 87
End: 2022-04-07
Payer: MEDICARE

## 2022-04-07 VITALS
BODY MASS INDEX: 32.45 KG/M2 | DIASTOLIC BLOOD PRESSURE: 81 MMHG | WEIGHT: 190.06 LBS | HEIGHT: 64 IN | HEART RATE: 78 BPM | SYSTOLIC BLOOD PRESSURE: 179 MMHG

## 2022-04-07 DIAGNOSIS — R91.8 PULMONARY NODULES/LESIONS, MULTIPLE: ICD-10-CM

## 2022-04-07 DIAGNOSIS — Z71.9 COUNSELING, UNSPECIFIED: ICD-10-CM

## 2022-04-07 DIAGNOSIS — R76.8 POSITIVE ANA (ANTINUCLEAR ANTIBODY): Primary | ICD-10-CM

## 2022-04-07 DIAGNOSIS — Z94.7 HISTORY OF CORNEA TRANSPLANT: ICD-10-CM

## 2022-04-07 DIAGNOSIS — M79.10 MYALGIA: ICD-10-CM

## 2022-04-07 DIAGNOSIS — M62.81 MUSCLE WEAKNESS OF EXTREMITY: ICD-10-CM

## 2022-04-07 PROCEDURE — 99205 OFFICE O/P NEW HI 60 MIN: CPT | Mod: S$GLB,,,

## 2022-04-07 PROCEDURE — 1126F PR PAIN SEVERITY QUANTIFIED, NO PAIN PRESENT: ICD-10-PCS | Mod: CPTII,S$GLB,,

## 2022-04-07 PROCEDURE — 1101F PT FALLS ASSESS-DOCD LE1/YR: CPT | Mod: CPTII,S$GLB,,

## 2022-04-07 PROCEDURE — 1159F MED LIST DOCD IN RCRD: CPT | Mod: CPTII,S$GLB,,

## 2022-04-07 PROCEDURE — 1159F PR MEDICATION LIST DOCUMENTED IN MEDICAL RECORD: ICD-10-PCS | Mod: CPTII,S$GLB,,

## 2022-04-07 PROCEDURE — 99999 PR PBB SHADOW E&M-EST. PATIENT-LVL IV: CPT | Mod: PBBFAC,,,

## 2022-04-07 PROCEDURE — 3288F PR FALLS RISK ASSESSMENT DOCUMENTED: ICD-10-PCS | Mod: CPTII,S$GLB,,

## 2022-04-07 PROCEDURE — 1160F PR REVIEW ALL MEDS BY PRESCRIBER/CLIN PHARMACIST DOCUMENTED: ICD-10-PCS | Mod: CPTII,S$GLB,,

## 2022-04-07 PROCEDURE — 3288F FALL RISK ASSESSMENT DOCD: CPT | Mod: CPTII,S$GLB,,

## 2022-04-07 PROCEDURE — 1126F AMNT PAIN NOTED NONE PRSNT: CPT | Mod: CPTII,S$GLB,,

## 2022-04-07 PROCEDURE — 99999 PR PBB SHADOW E&M-EST. PATIENT-LVL IV: ICD-10-PCS | Mod: PBBFAC,,,

## 2022-04-07 PROCEDURE — 1160F RVW MEDS BY RX/DR IN RCRD: CPT | Mod: CPTII,S$GLB,,

## 2022-04-07 PROCEDURE — 99205 PR OFFICE/OUTPT VISIT, NEW, LEVL V, 60-74 MIN: ICD-10-PCS | Mod: S$GLB,,,

## 2022-04-07 PROCEDURE — 1101F PR PT FALLS ASSESS DOC 0-1 FALLS W/OUT INJ PAST YR: ICD-10-PCS | Mod: CPTII,S$GLB,,

## 2022-04-07 RX ORDER — LOTEPREDNOL ETABONATE 3.8 MG/G
1 GEL OPHTHALMIC DAILY
Qty: 10 G | Refills: 3 | Status: SHIPPED | OUTPATIENT
Start: 2022-04-07 | End: 2022-06-15

## 2022-04-07 NOTE — TELEPHONE ENCOUNTER
Hi, pt doing labs for us on 5/10/22, would like labs needed from PCP to be linked as well.    Please advise.

## 2022-04-07 NOTE — PROGRESS NOTES
RHEUMATOLOGY OUTPATIENT CLINIC NOTE    04/07/2022    Subjective:       Patient ID: Shirley Metz is a 89 y.o. female.    Chief Complaint: Consult and Joint Pain      Shirley Metz is a 89 y.o. pleasant female here for rheumatology initial evaluation for abnormal lab results (VIRY).  Labs done as part of workup for muscle and joint pain.  Patient reports a multiple year history of muscle pains and weakness which she attributes to prior statin use.  She reports generalized weakness requiring a rolling walker for ambulation.  Additionally, she reports ocular dryness for which she uses Systane daily, lower extremity edema currently on hctz, pruritic rash on forehead and right shoulder for which she uses calendula and arnica creams. She uses castor oil for her hands.  She also reports bilateral calf soreness at nighttime for which she uses osteoflex and arnica cream.  She is concerned regarding for dime-sized lumps on her forehead above her eyebrows which are pruritic.    Additionally, she reports multiple lung nodules seen on CT.  It appears pulmonology will continue to monitor this with CT.  Biopsy showing bland fibrosis on pathology.    Patient denies any photosensitivity, no sunsensitive rashes, no nodules or other skin lesions. No pleuritic chest pain, no mouth ulcers, no unexplained fevers, no weight loss, no syncopal or near syncopal episodes, no redness, warmth or swelling to any of her peripheral joints, no muscle weakness.  No recurrent infections. No hematuria or dysuria, no flank pain.  Denies tri colored discoloration of fingers upon cold exposure. Rheumatologic review of systems negative otherwise.    No known family history of autoimmune disease.     Patient has many allergies to medications which she says limits ability to treat pruritic rash, lower extremity edema.      Past Medical History:   Diagnosis Date    Anemia 11/29/2018    Anxiety 11/28/2017    Arthritis      Atrial fibrillation with RVR 10/9/2019    Back pain     Basal cell carcinoma 03/29/2018    left mid back    Chronic diastolic congestive heart failure 10/15/2019    CKD (chronic kidney disease) stage 3, GFR 30-59 ml/min 8/8/2016    Colon polyps     reported per patient.     Coronary artery calcification 10/5/2021    Fuchs' corneal dystrophy     General anesthetics causing adverse effect in therapeutic use     woke up during surgery and gagged on ET tube    Glaucoma     Glaucoma     Heart failure with preserved left ventricular function (HFpEF) 7/24/2018    Hyperlipidemia     Hypertension     Macular degeneration dry    Obesity     PVD (peripheral vascular disease) 4/26/2021    PVD (peripheral vascular disease) 4/26/2021    Squamous cell carcinoma 01/08/2019    left shoulder    Stenosis of carotid artery 10/5/2021    Trouble in sleeping     Type 2 diabetes mellitus without complication, without long-term current use of insulin 2/24/2021    Urinary tract infection      Past Surgical History:   Procedure Laterality Date    ADENOIDECTOMY  1938    BREAST BIOPSY Left     1997. benign    BREAST CYST EXCISION Left 1997 approx    CARPAL TUNNEL RELEASE Right 2012 approx    CATARACT EXTRACTION Bilateral 1994    CHOLECYSTECTOMY  1975    CORNEAL TRANSPLANT Bilateral 08/2015 8/2015 - left; Right 4/2016    EYE SURGERY      Fuch's Corneal dystrophy - had 2nd operation with Dr. Quintanilla    EYE SURGERY      Cataract wIOL    left thumb Left 2011    removed cuboid for arthritis    REVERSE TOTAL SHOULDER ARTHROPLASTY Left 8/21/2018    Procedure: ARTHROPLASTY, SHOULDER, TOTAL, REVERSE;  Surgeon: Solomon Lujan MD;  Location: [facility];  Service: Orthopedics;  Laterality: Left;  WITH BICEP TENODESIS    SKIN CANCER EXCISION Left 2017    BCC removal by Dr. Valadez    SLT- OS (aka TRABECULOPLASTY) Left 05/11/2011    repeat 3/14/12    TENOTOMY Left 8/21/2018    Procedure: TENOTOMY;  Surgeon: Solomon POND  MD Tai;  Location: [facility] OR;  Service: Orthopedics;  Laterality: Left;    TONSILLECTOMY  1938    TREATMENT OF CARDIAC ARRHYTHMIA N/A 10/10/2019    Procedure: CARDIOVERSION;  Surgeon: Pete Durán MD;  Location: [facility] CATH LAB;  Service: Cardiology;  Laterality: N/A;    TREATMENT OF CARDIAC ARRHYTHMIA N/A 12/6/2019    Procedure: CARDIOVERSION;  Surgeon: Samy Castillo MD;  Location: [facility] CATH LAB;  Service: Cardiology;  Laterality: N/A;     Family History   Problem Relation Age of Onset    Heart disease Mother     Hypertension Mother     Cancer Father 88        sarcoma( ?Kaposi's ) on leg    Deep vein thrombosis Neg Hx     Ovarian cancer Neg Hx     Breast cancer Neg Hx     Kidney disease Neg Hx     Strabismus Neg Hx     Retinal detachment Neg Hx     Macular degeneration Neg Hx     Glaucoma Neg Hx     Blindness Neg Hx     Amblyopia Neg Hx     Eczema Neg Hx     Lupus Neg Hx     Melanoma Neg Hx     Psoriasis Neg Hx     Diabetes Neg Hx     Stroke Neg Hx     Mental retardation Neg Hx     Mental illness Neg Hx     Hyperlipidemia Neg Hx     COPD Neg Hx     Asthma Neg Hx     Depression Neg Hx     Alcohol abuse Neg Hx     Drug abuse Neg Hx      Social History     Socioeconomic History    Marital status:     Number of children: 3   Tobacco Use    Smoking status: Never Smoker    Smokeless tobacco: Never Used    Tobacco comment: history of passive smoking from her ex-   Substance and Sexual Activity    Alcohol use: Yes     Alcohol/week: 2.0 standard drinks     Types: 2 Standard drinks or equivalent per week     Comment: occ    Drug use: No    Sexual activity: Not Currently     Partners: Male     Birth control/protection: Post-menopausal     Comment: discussed protection for STD's   Other Topics Concern    Are you pregnant or think you may be? No    Breast-feeding No   Social History Narrative     x 2,  x 1. 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 Still drives. Does have a Living Will.     Social Determinants of Health     Financial Resource Strain: Low Risk     Difficulty of Paying Living Expenses: Not hard at all   Food Insecurity: No Food Insecurity    Worried About Running Out of Food in the Last Year: Never true    Ran Out of Food in the Last Year: Never true   Transportation Needs: No Transportation Needs    Lack of Transportation (Medical): No    Lack of Transportation (Non-Medical): No   Physical Activity: Insufficiently Active    Days of Exercise per Week: 3 days    Minutes of Exercise per Session: 10 min   Stress: No Stress Concern Present    Feeling of Stress : Not at all   Social Connections: Moderately Isolated    Frequency of Communication with Friends and Family: Three times a week    Frequency of Social Gatherings with Friends and Family: Three times a week    Attends Christianity Services: Never    Active Member of Clubs or Organizations: Yes    Attends Club or Organization Meetings: More than 4 times per year    Marital Status:    Housing Stability: Low Risk     Unable to Pay for Housing in the Last Year: No    Number of Places Lived in the Last Year: 1    Unstable Housing in the Last Year: No     Review of patient's allergies indicates:   Allergen Reactions    Carvedilol Swelling     lips  lips  Other reaction(s): swelling    Doxycycline calcium Other (See Comments)     Weakness nausea   sob  Other reaction(s): weakness, nausea, SOB    Erythromycin Other (See Comments)     Complete weakness/could not walk    Gadolinium-containing contrast media Other (See Comments)     angioedema  Other reaction(s): Angioedema    Hydrocodone-acetaminophen      wheezing  wheezing  wheezing  Other reaction(s): wheezing    Inveltys [loteprednol etabonate] Palpitations and Other (See  Comments)     Patient stated bp went up.    Labetalol Shortness Of Breath, Nausea Only and Other (See Comments)     Palpitations, LE weakness  Other reaction(s): SOB, palpitations, weakness    Loratadine Other (See Comments)     Double vision/spots  Other reaction(s): double vision    Macrolide antibiotics Other (See Comments)     Complete weakness/could not walk  Complete weakness/could not walk  Complete weakness/could not walk  Complete weakness/could not walk  Other reaction(s): complete weakness    Moxifloxacin Other (See Comments)     Hives   muscle soreness  Other reaction(s): hives, muscle soreness    Naproxen      wheezing  wheezing  wheezing  Other reaction(s): wheezing    Statins-hmg-coa reductase inhibitors Other (See Comments)     Severe Muscle weakness  Muscle weakness  Severe Muscle weakness  Other reaction(s): severe muscle weakness    Timolol Other (See Comments)     Severe muscle weakness, eye problems.  Other reaction(s): severe muscle weakness    Verapamil Diarrhea     Severe diarrhea.  Other reaction(s): diarrhea    Amlodipine      Myalgias    Other reaction(s): myalgian    Fenofibrate      Causes muscle weakness  Other reaction(s): muscle weakness    Hydralazine      Other reaction(s): muscle tremors    Hydralazine analogues      Muscle tremors/aches      Isosorbide      Tolerates Imdur    Loteprednol      Other reaction(s): increased BP    Adhesive Rash     Clonidine patch - 2 mfg - contact dermatitis    Codeine Diarrhea and Other (See Comments)     Loss of balance   Other reaction(s): loss of balance    Doxazosin Palpitations     Other reaction(s): palpitations    Fexofenadine Other (See Comments)     Double vision/spots   Other reaction(s): double vision    Hydrocortisone (bulk) Other (See Comments)     Only suppository/ caused muscle weakness    Latanoprost Other (See Comments)     Muscle weakness  Other reaction(s): muscle weakness    Olopatadine Other (See Comments)  "    Muscle weakness  Other reaction(s): muscle weakness    Prednisone Anxiety           Objective:   BP (!) 179/81 Comment: denies headache or dizziness  Pulse 78   Ht 5' 4" (1.626 m)   Wt 86.2 kg (190 lb 0.6 oz)   LMP  (LMP Unknown)   BMI 32.62 kg/m²   Immunization History   Administered Date(s) Administered    COVID-19, MRNA, LN-S, PF (Pfizer) (Purple Cap) 01/05/2021, 01/26/2021, 09/29/2021    Influenza (FLUAD) - Quadrivalent - Adjuvanted - PF *Preferred* (65+) 10/19/2020, 03/24/2022    Influenza - High Dose - PF (65 years and older) 10/27/2006, 10/09/2007, 11/05/2008, 11/02/2009, 10/22/2010, 11/09/2011, 10/15/2013, 10/24/2014, 11/02/2015, 10/18/2018    Pneumococcal Polysaccharide - 23 Valent 03/16/2022    Tdap 11/09/2011              Physical Exam   Constitutional: She is oriented to person, place, and time. She does not appear ill. No distress.   HENT:   Head: Normocephalic and atraumatic.   Right Ear: External ear normal.   Left Ear: External ear normal.   Mouth/Throat: Mucous membranes are moist. No oropharyngeal exudate or posterior oropharyngeal erythema. Oropharynx is clear.   Pulmonary/Chest: Effort normal. No respiratory distress.   Abdominal: Normal appearance.   Musculoskeletal:         General: Tenderness and deformity present. No swelling or signs of injury. Normal range of motion.      Right lower leg: Edema present.      Left lower leg: Edema present.      Comments: Hard nodule on right 5th PIP, finger unable to fully extend  Full fist formation bilaterally  No obvious synovitis, no swelling, no increased warmth, no erythema to any joints  Dupuytren's contracture left hand    donna wrists, mcps, pips no synvoitis, no tenderness, no warmth, good rom  donna elbows shoulders no swelling or tenderness,full rom  donna knees no effusion, no warmth, no tenderness, full rom     Strength 5/5 bilateral upper and lower extremity with the exception of left hip flexion 4/5    Patient ambulates with rolling " walker     Neurological: She is alert and oriented to person, place, and time.   Skin: Skin is warm. No rash noted. No erythema.   No rash observed.  Slight erythema above eyebrows no objective masses palpated in this area of patient's concern.  No erythema, no dry skin, no active rash in bilateral arms.  Minimal redness to sun-exposed areas on arms below level of T shirt and on legs below level of shorts.   Psychiatric: Her behavior is normal. Mood normal.         No results found for this or any previous visit (from the past 672 hour(s)).     No results found for: TBGOLDPLUS   Lab Results   Component Value Date    HEPAIGM Negative 11/21/2006    HEPBIGM Negative 11/21/2006    HEPCAB Negative 11/21/2006        PET CT 05/31/2021  FINDINGS:  Head/neck: Physiologic activity present without concerning FDG avid focus.  Right maxillary sinus floor mucosal thickening.     Chest: No FDG avid or enlarged axillary or hilar lymph nodes.  Previously described enlarged precarinal lymph node demonstrates SUV max 2.2.     Multiple pulmonary nodules present, majority are below PET-CT threshold.  Largest nodules within the left lung demonstrate no significant FDG activity.  Similar nodules within the right mid lung appear smaller when compared to prior CT exam, axial fused images 63/64.  Nodular opacity within the right middle lobe also appears less prevalent with questionable faint low level activity.  Two tiny nodules within the right midlung, axial fused image 71 also demonstrate faint low level activity.  No focal consolidation present with scarring/atelectatic findings noted.     Abdomen/pelvis: No concerning FDG avid focus.  Physiologic changes noted.  Colonic diverticulosis present.     Skeletal/subcutaneous structures: No concerning FDG avid osseous lesion.  Bones are osteopenic with degenerative findings of the spine and hips.  Left shoulder arthroplasty noted.  Right shoulder degenerative findings  present.     Impression:     Multiple pulmonary nodules, majority below PET-CT threshold.  There is some suspected improvement/decrease in size of right lung nodules.  A few of the right lung nodules do demonstrate low level FDG activity.  Findings likely represent infectious or granulomatous process.  Neoplastic process felt to be less likely.  Follow-up diagnostic CT chest imaging recommended.    Assessment:       1. Positive VIRY (antinuclear antibody)    2. Myalgia    3. Counseling, unspecified    4. Pulmonary nodules/lesions, multiple    5. Muscle weakness of extremity          Impression:     Positive VIRY done as workup for persistent muscle pain and weakness  Low titer 1:160 homogeneous pattern, remaining panel negative  RF, CCP normal  Sed rate when corrected for age and gender within normal limits.  CRP normal  SPEP done in 2006 normal  Symptoms reported include myalgias, ocular dryness, muscular weakness, lower extremity edema, rash on shoulder and face, calf soreness  No rash appreciated on physical exam, weakness as below.  Low suspicion for rheumatologic cause of symptoms at this time.    Myalgias and muscle weakness  Patient reports generalized muscular weakness, weakness seen on physical exam only on left hip flexion.    Patient attributes muscle weakness to statin use many years ago.  Currently off of statins.  CPK normal   Myalgias bilateral calves at nighttime for which she uses osteoflex and arnica cream    Other specified counseling  Over 10 minutes spent regarding below topics:  - Immunization counseling done.  - Nutrition and exercise counseling.  - Medication counseling provided.     Pulmonary nodules  S seen on CT.  Pulmonology following.    Plan:          Orders Placed This Encounter   Procedures    X-Ray Hand Complete Bilateral    Urinalysis    C3 Complement    C4 Complement    Protein/Creatinine Ratio, Urine    Aldolase    MyoMarker Panel 3    HMGCR (HMG Coenzyme A Reductase) Ab         Workup as above.    If results increased suspicion for myositis, possibly consider EMG of left leg to determine cause of weakness observed on exam.    Patient requested these labs to be done on May 10th on the same day as her upcoming ophthalmology appointment.      Patient would like to follow with Dr. Jiménez moving forward.  Appointment scheduled in June.        Ami Serna PA-C  [facility]  Rheumatology       60 minutes of total time spent on the encounter, which includes face to face time and non-face to face time preparing to see the patient (eg, review of tests), Obtaining and/or reviewing separately obtained history, Documenting clinical information in the electronic or other health record, Independently interpreting results (not separately reported) and communicating results to the patient/family/caregiver, or Care coordination (not separately reported).

## 2022-04-13 ENCOUNTER — TELEPHONE (OUTPATIENT)
Dept: PRIMARY CARE CLINIC | Facility: CLINIC | Age: 87
End: 2022-04-13
Payer: COMMERCIAL

## 2022-04-13 NOTE — TELEPHONE ENCOUNTER
Patient call returned. She stated that she wanted to r/s her 5/18 appointment. Patient appointment was r/s to 5/19 & she verbally understood the appointment information given.

## 2022-04-13 NOTE — TELEPHONE ENCOUNTER
----- Message from Reilly Craig sent at 4/13/2022  2:10 PM CDT -----  Contact: Shirley Landa is requesting a call to reschedule an appointment. Please call her back at [phone].          Thanks  DD

## 2022-04-22 ENCOUNTER — TELEPHONE (OUTPATIENT)
Dept: PRIMARY CARE CLINIC | Facility: CLINIC | Age: 87
End: 2022-04-22

## 2022-04-22 NOTE — TELEPHONE ENCOUNTER
Patient called in with a few concerns. She says her B/P has been running high. This morning it was 170s/80s, she has taken the Isosorbide with her breakfast this morning around 8am. After breakfast her b/p was 174-81 pulse 68. She says she feels shaky, has tingling in her arms and has weakness with head discomfort. Patient says she hasn't has lasik in over one week but she is taking the HCTZ but has not had that in about 5 days. She reports that she is still having issues with frequent urination even though she has been off of HCTZ for the 5 days. She says she has taken Clonidine but it it out dated ( in .

## 2022-04-22 NOTE — TELEPHONE ENCOUNTER
----- Message from Trang Gill sent at 4/22/2022 11:09 AM CDT -----  Contact: ACOSTA SLAUGHTER [[id number]]  .Type:  Needs Medical Advice    Who Called: ACOSTA SLAUGHTER [[id number]]  Would the patient rather a call back or a response via [facility]? call  Best Call Back Number: .[phone] (home)    Additional Information: Pt is req a call back in regards to high blood pressure.. Thanks AW

## 2022-04-25 ENCOUNTER — TELEPHONE (OUTPATIENT)
Dept: PRIMARY CARE CLINIC | Facility: CLINIC | Age: 87
End: 2022-04-25
Payer: COMMERCIAL

## 2022-04-25 ENCOUNTER — OFFICE VISIT (OUTPATIENT)
Dept: PRIMARY CARE CLINIC | Facility: CLINIC | Age: 87
End: 2022-04-25
Payer: COMMERCIAL

## 2022-04-25 VITALS
SYSTOLIC BLOOD PRESSURE: 144 MMHG | HEART RATE: 77 BPM | HEIGHT: 64 IN | OXYGEN SATURATION: 95 % | DIASTOLIC BLOOD PRESSURE: 72 MMHG | WEIGHT: 189.63 LBS | TEMPERATURE: 99 F | BODY MASS INDEX: 32.37 KG/M2

## 2022-04-25 DIAGNOSIS — R31.29 OTHER MICROSCOPIC HEMATURIA: ICD-10-CM

## 2022-04-25 DIAGNOSIS — R35.0 URINARY FREQUENCY: ICD-10-CM

## 2022-04-25 DIAGNOSIS — I10 PRIMARY HYPERTENSION: Primary | ICD-10-CM

## 2022-04-25 LAB
BACTERIA #/AREA URNS HPF: NORMAL /HPF
BILIRUB UR QL STRIP: NEGATIVE
CLARITY UR: CLEAR
COLOR UR: YELLOW
GLUCOSE UR QL STRIP: NEGATIVE
HGB UR QL STRIP: ABNORMAL
KETONES UR QL STRIP: NEGATIVE
LEUKOCYTE ESTERASE UR QL STRIP: NEGATIVE
MICROSCOPIC COMMENT: NORMAL
NITRITE UR QL STRIP: NEGATIVE
PH UR STRIP: 6 [PH] (ref 5–8)
PROT UR QL STRIP: NEGATIVE
RBC #/AREA URNS HPF: 4 /HPF (ref 0–4)
SP GR UR STRIP: 1.02 (ref 1–1.03)
URN SPEC COLLECT METH UR: ABNORMAL
UROBILINOGEN UR STRIP-ACNC: NEGATIVE EU/DL
WBC #/AREA URNS HPF: 1 /HPF (ref 0–5)

## 2022-04-25 PROCEDURE — 1159F PR MEDICATION LIST DOCUMENTED IN MEDICAL RECORD: ICD-10-PCS | Mod: HCNC,CPTII,S$GLB, | Performed by: NURSE PRACTITIONER

## 2022-04-25 PROCEDURE — 1126F AMNT PAIN NOTED NONE PRSNT: CPT | Mod: HCNC,CPTII,S$GLB, | Performed by: NURSE PRACTITIONER

## 2022-04-25 PROCEDURE — 99215 OFFICE O/P EST HI 40 MIN: CPT | Mod: HCNC,S$GLB,, | Performed by: NURSE PRACTITIONER

## 2022-04-25 PROCEDURE — 99999 PR PBB SHADOW E&M-EST. PATIENT-LVL V: CPT | Mod: PBBFAC,HCNC,, | Performed by: NURSE PRACTITIONER

## 2022-04-25 PROCEDURE — 1101F PR PT FALLS ASSESS DOC 0-1 FALLS W/OUT INJ PAST YR: ICD-10-PCS | Mod: HCNC,CPTII,S$GLB, | Performed by: NURSE PRACTITIONER

## 2022-04-25 PROCEDURE — 1159F MED LIST DOCD IN RCRD: CPT | Mod: HCNC,CPTII,S$GLB, | Performed by: NURSE PRACTITIONER

## 2022-04-25 PROCEDURE — 1101F PT FALLS ASSESS-DOCD LE1/YR: CPT | Mod: HCNC,CPTII,S$GLB, | Performed by: NURSE PRACTITIONER

## 2022-04-25 PROCEDURE — 99999 PR PBB SHADOW E&M-EST. PATIENT-LVL V: ICD-10-PCS | Mod: PBBFAC,HCNC,, | Performed by: NURSE PRACTITIONER

## 2022-04-25 PROCEDURE — 81000 URINALYSIS NONAUTO W/SCOPE: CPT | Mod: HCNC | Performed by: NURSE PRACTITIONER

## 2022-04-25 PROCEDURE — 3288F FALL RISK ASSESSMENT DOCD: CPT | Mod: HCNC,CPTII,S$GLB, | Performed by: NURSE PRACTITIONER

## 2022-04-25 PROCEDURE — 3288F PR FALLS RISK ASSESSMENT DOCUMENTED: ICD-10-PCS | Mod: HCNC,CPTII,S$GLB, | Performed by: NURSE PRACTITIONER

## 2022-04-25 PROCEDURE — 99215 PR OFFICE/OUTPT VISIT, EST, LEVL V, 40-54 MIN: ICD-10-PCS | Mod: HCNC,S$GLB,, | Performed by: NURSE PRACTITIONER

## 2022-04-25 PROCEDURE — 1126F PR PAIN SEVERITY QUANTIFIED, NO PAIN PRESENT: ICD-10-PCS | Mod: HCNC,CPTII,S$GLB, | Performed by: NURSE PRACTITIONER

## 2022-04-25 RX ORDER — HYDROCHLOROTHIAZIDE 25 MG/1
25 TABLET ORAL DAILY
Qty: 30 TABLET | Refills: 11 | Status: SHIPPED | OUTPATIENT
Start: 2022-04-25 | End: 2022-05-23

## 2022-04-25 RX ORDER — CLONIDINE HYDROCHLORIDE 0.1 MG/1
0.1 TABLET ORAL 2 TIMES DAILY PRN
Qty: 20 TABLET | Refills: 1 | Status: SHIPPED | OUTPATIENT
Start: 2022-04-25 | End: 2022-07-14

## 2022-04-25 NOTE — TELEPHONE ENCOUNTER
----- Message from Douglas Bronson sent at 4/25/2022 10:18 AM CDT -----  Contact: Shirley Rg  .Type:  Patient Returning Call    Who Called: Shirley   Who Left Message for Patient:unknown  Does the patient know what this is regarding?:  Requests to be seen today  Would the patient rather a call back or a response via [facility]? call  Best Call Back Number:[phone]  Additional Information: reports missing a call, requests another high priority call back due to a lot of pain.  Thanks.  JEFFREY

## 2022-04-25 NOTE — PROGRESS NOTES
"Shirley Metz  2022  [id number]    Heather Miller NP  Patient Care Team:  Heather Miller NP as PCP - General (Family Medicine)  Wilbert Ware MD as Consulting Physician (Ophthalmology)  Rajinder Quintanilla MD as Consulting Physician (Ophthalmology)  Suzan Gregorio PA-C as Physician Assistant (Rheumatology)  Rosario Valadez MD as Consulting Physician (Dermatology)  Trevon Ramirez MD as Consulting Physician (Cardiology)  Amish Mendoza MD (Pulmonary Disease)  Jaquan Choi MD as Consulting Physician (Vascular Surgery)  Debbie Choi MD (Inactive) as Consulting Physician (Gynecology)  Emmanuel Fish MD as Consulting Physician (Hand Surgery)  PAXTON Chaidez Jr., MD as Consulting Physician (Orthopedic Surgery)        [facility] Primary Care Note      Chief Complaint:  Chief Complaint   Patient presents with    Possible UTI    Fluctuating B/P       History of Present Illness:  HPI    Possible UTI, labile BP.     Note received Friday: Patient called in with a few concerns. She says her B/P has been running high. This morning it was 170s/80s, she has taken the Isosorbide with her breakfast this morning around 8am. After breakfast her b/p was 174-81 pulse 68. She says she feels shaky, has tingling in her arms and has weakness with head discomfort. Patient says she hasn't has lasik in over one week but she is taking the HCTZ but has not had that in about 5 days. She reports that she is still having issues with frequent urination even though she has been off of HCTZ for the 5 days. She says she has taken Clonidine but it it out dated ( in .      Increased urine volume and frequency, even when she holds HCTZ.   No dysuria. Some lower abdominal discomfort.     Itching to lower spine, swollen area, "peaked." Was not able to see area. H/O similar sx to left shoulder r/t squamous cell CA.     Elevated BP, SBP near 200 two mornings, onset 1.5 weeks ago. Stopped AM isosorbide one " month ago by cardiology lina low BP. Recommended to start HCTZ at lower dose then. Losartan dose increased by cardiology 3/24. Lasix dose changed to PRN. She has not taken Lasix in past 3 weeks.     Pt believes HCTZ and Lasix not helping swelling    Pt resumed AM isosorbide dose on 4/12 on her own.  AM BP still elevated.     Has clonidine to take PRN.  Needs updated rx.     Some dyspnea, occasional. Worse when BP started elevating. Worse with activity.     Itching to forehead, side of right arm, scalp, ears. Relieved temporarily by calendula cream. Ears have improved. No rash.     Has lab appt 5/10 for rheumatology labs. Does not want to complete laB today.           Review of Systems   Constitutional: Negative for chills and fever.   Respiratory: Positive for shortness of breath. Negative for cough.    Cardiovascular: Positive for leg swelling (mild, ankles). Negative for chest pain.   Gastrointestinal: Negative for heartburn, nausea and vomiting.   Genitourinary: Positive for frequency and urgency. Negative for dysuria and hematuria.   Musculoskeletal: Positive for myalgias (at baseline).   Neurological: Negative for dizziness and headaches.   Endo/Heme/Allergies: Negative for polydipsia.         The following were reviewed: Active problem list, medication list, allergies, family history, social history, and Health Maintenance.     History:  Past Medical History:   Diagnosis Date    Anemia 11/29/2018    Anxiety 11/28/2017    Arthritis     Atrial fibrillation with RVR 10/9/2019    Back pain     Basal cell carcinoma 03/29/2018    left mid back    Chronic diastolic congestive heart failure 10/15/2019    CKD (chronic kidney disease) stage 3, GFR 30-59 ml/min 8/8/2016    Colon polyps     reported per patient.     Coronary artery calcification 10/5/2021    Fuchs' corneal dystrophy     General anesthetics causing adverse effect in therapeutic use     woke up during surgery and gagged on ET tube    Glaucoma      Glaucoma     Heart failure with preserved left ventricular function (HFpEF) 7/24/2018    Hyperlipidemia     Hypertension     Macular degeneration dry    Obesity     PVD (peripheral vascular disease) 4/26/2021    PVD (peripheral vascular disease) 4/26/2021    Squamous cell carcinoma 01/08/2019    left shoulder    Stenosis of carotid artery 10/5/2021    Trouble in sleeping     Type 2 diabetes mellitus without complication, without long-term current use of insulin 2/24/2021    Urinary tract infection      Past Surgical History:   Procedure Laterality Date    ADENOIDECTOMY  1938    BREAST BIOPSY Left     1997. benign    BREAST CYST EXCISION Left 1997 approx    CARPAL TUNNEL RELEASE Right 2012 approx    CATARACT EXTRACTION Bilateral 1994    CHOLECYSTECTOMY  1975    CORNEAL TRANSPLANT Bilateral 08/2015 8/2015 - left; Right 4/2016    EYE SURGERY      Fuch's Corneal dystrophy - had 2nd operation with Dr. Quintanilla    EYE SURGERY      Cataract wIOL    left thumb Left 2011    removed cuboid for arthritis    REVERSE TOTAL SHOULDER ARTHROPLASTY Left 8/21/2018    Procedure: ARTHROPLASTY, SHOULDER, TOTAL, REVERSE;  Surgeon: Solomon Lujan MD;  Location: [facility];  Service: Orthopedics;  Laterality: Left;  WITH BICEP TENODESIS    SKIN CANCER EXCISION Left 2017    BCC removal by Dr. Valadez    SLT- OS (aka TRABECULOPLASTY) Left 05/11/2011    repeat 3/14/12    TENOTOMY Left 8/21/2018    Procedure: TENOTOMY;  Surgeon: Solomon Lujan MD;  Location: [facility];  Service: Orthopedics;  Laterality: Left;    TONSILLECTOMY  1938    TREATMENT OF CARDIAC ARRHYTHMIA N/A 10/10/2019    Procedure: CARDIOVERSION;  Surgeon: Pete Durán MD;  Location: [facility];  Service: Cardiology;  Laterality: N/A;    TREATMENT OF CARDIAC ARRHYTHMIA N/A 12/6/2019    Procedure: CARDIOVERSION;  Surgeon: Samy Castillo MD;  Location: [facility];  Service: Cardiology;  Laterality: N/A;     Family History   Problem Relation  Age of Onset    Heart disease Mother     Hypertension Mother     Cancer Father 88        sarcoma( ?Kaposi's ) on leg    Deep vein thrombosis Neg Hx     Ovarian cancer Neg Hx     Breast cancer Neg Hx     Kidney disease Neg Hx     Strabismus Neg Hx     Retinal detachment Neg Hx     Macular degeneration Neg Hx     Glaucoma Neg Hx     Blindness Neg Hx     Amblyopia Neg Hx     Eczema Neg Hx     Lupus Neg Hx     Melanoma Neg Hx     Psoriasis Neg Hx     Diabetes Neg Hx     Stroke Neg Hx     Mental retardation Neg Hx     Mental illness Neg Hx     Hyperlipidemia Neg Hx     COPD Neg Hx     Asthma Neg Hx     Depression Neg Hx     Alcohol abuse Neg Hx     Drug abuse Neg Hx      Social History     Socioeconomic History    Marital status:     Number of children: 3   Tobacco Use    Smoking status: Never Smoker    Smokeless tobacco: Never Used    Tobacco comment: history of passive smoking from her ex-   Substance and Sexual Activity    Alcohol use: Yes     Alcohol/week: 2.0 standard drinks     Types: 2 Standard drinks or equivalent per week     Comment: occ    Drug use: No    Sexual activity: Not Currently     Partners: Male     Birth control/protection: Post-menopausal     Comment: discussed protection for STD's   Other Topics Concern    Are you pregnant or think you may be? No    Breast-feeding No   Social History Narrative     x 2,  x 1. Retired artist - previously doing jewelry/wall pieces; ; lives in [facility]; has 3 sons - 52( lives in [place], 54, and 56;exercises minimally - limited due to back issues. Still drives. Does have a Living Will.     Social Determinants of Health     Financial Resource Strain: Low Risk     Difficulty of Paying Living Expenses: Not hard at all   Food Insecurity: No Food Insecurity    Worried About Running Out of Food in the Last Year: Never true    Ran Out of Food in the Last Year: Never true    Transportation Needs: No Transportation Needs    Lack of Transportation (Medical): No    Lack of Transportation (Non-Medical): No   Physical Activity: Insufficiently Active    Days of Exercise per Week: 3 days    Minutes of Exercise per Session: 10 min   Stress: No Stress Concern Present    Feeling of Stress : Not at all   Social Connections: Moderately Isolated    Frequency of Communication with Friends and Family: Three times a week    Frequency of Social Gatherings with Friends and Family: Three times a week    Attends Jew Services: Never    Active Member of Clubs or Organizations: Yes    Attends Club or Organization Meetings: More than 4 times per year    Marital Status:    Housing Stability: Low Risk     Unable to Pay for Housing in the Last Year: No    Number of Places Lived in the Last Year: 1    Unstable Housing in the Last Year: No     Patient Active Problem List   Diagnosis    HTN (hypertension)    Mixed hyperlipidemia    Primary osteoarthritis involving multiple joints    Macular degeneration - Both Eyes    Fuch's endothelial dystrophy - Both Eyes    Lens replaced by other means    COAG (chronic open-angle glaucoma) - Both Eyes    Blepharitis, unspecified - Both Eyes    Abnormal ECG    PVC's (premature ventricular contractions)    Other microscopic hematuria    LVH (left ventricular hypertrophy) due to hypertensive disease    Osteopenia of hip    Calcification of aorta    Neuropathy    Unequal blood pressures in paired extremities    Medication side effects    History of cornea transplant    Pseudophakia    Insomnia    Anxiety    Pre-diabetes    Obesity (BMI 30.0-34.9)    Bilateral shoulder region arthritis    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    Anemia    Thrombus of left atrial appendage    PAF (paroxysmal atrial fibrillation)    Elevated liver enzymes    Chronic anticoagulation     Keratitis sicca, bilateral    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    PAD (peripheral artery disease)    Statin intolerance    Pulmonary nodules/lesions, multiple    Spinal stenosis at L4-L5 level    Pulmonary granuloma    Lumbar spondylosis    Bilateral recurrent inguinal hernia without obstruction or gangrene    LLQ abdominal pain    Pain in left leg    Cellulitis, face    Rectal abnormality    Stenosis of carotid artery    Carotid artery calcification, bilateral    B12 deficiency     Review of patient's allergies indicates:   Allergen Reactions    Carvedilol Swelling     lips  lips  Other reaction(s): swelling    Doxycycline calcium Other (See Comments)     Weakness nausea   sob  Other reaction(s): weakness, nausea, SOB    Erythromycin Other (See Comments)     Complete weakness/could not walk    Gadolinium-containing contrast media Other (See Comments)     angioedema  Other reaction(s): Angioedema    Hydrocodone-acetaminophen      wheezing  wheezing  wheezing  Other reaction(s): wheezing    Inveltys [loteprednol etabonate] Palpitations and Other (See Comments)     Patient stated bp went up.    Labetalol Shortness Of Breath, Nausea Only and Other (See Comments)     Palpitations, LE weakness  Other reaction(s): SOB, palpitations, weakness    Loratadine Other (See Comments)     Double vision/spots  Other reaction(s): double vision    Macrolide antibiotics Other (See Comments)     Complete weakness/could not walk  Complete weakness/could not walk  Complete weakness/could not walk  Complete weakness/could not walk  Other reaction(s): complete weakness    Moxifloxacin Other (See Comments)     Hives   muscle soreness  Other reaction(s): hives, muscle soreness    Naproxen      wheezing  wheezing  wheezing  Other reaction(s): wheezing    Statins-hmg-coa reductase inhibitors Other (See Comments)     Severe Muscle weakness  Muscle weakness  Severe Muscle weakness  Other reaction(s): severe muscle  weakness    Timolol Other (See Comments)     Severe muscle weakness, eye problems.  Other reaction(s): severe muscle weakness    Verapamil Diarrhea     Severe diarrhea.  Other reaction(s): diarrhea    Amlodipine      Myalgias    Other reaction(s): myalgian    Fenofibrate      Causes muscle weakness  Other reaction(s): muscle weakness    Hydralazine      Other reaction(s): muscle tremors    Hydralazine analogues      Muscle tremors/aches      Isosorbide      Tolerates Imdur    Loteprednol      Other reaction(s): increased BP    Adhesive Rash     Clonidine patch - 2 mfg - contact dermatitis    Codeine Diarrhea and Other (See Comments)     Loss of balance   Other reaction(s): loss of balance    Doxazosin Palpitations     Other reaction(s): palpitations    Fexofenadine Other (See Comments)     Double vision/spots   Other reaction(s): double vision    Hydrocortisone (bulk) Other (See Comments)     Only suppository/ caused muscle weakness    Latanoprost Other (See Comments)     Muscle weakness  Other reaction(s): muscle weakness    Olopatadine Other (See Comments)     Muscle weakness  Other reaction(s): muscle weakness    Prednisone Anxiety       Medications:  Current Outpatient Medications on File Prior to Visit   Medication Sig Dispense Refill    acetaminophen (TYLENOL) 500 MG tablet Take 500 mg by mouth daily as needed. Taking 1 to 3      ALPRAZolam (XANAX) 0.5 MG tablet Take 1 tablet (0.5 mg total) by mouth nightly as needed for Anxiety. 30 tablet 5    apixaban (ELIQUIS) 2.5 mg Tab Take 1 tablet (2.5 mg total) by mouth 2 (two) times daily. 180 tablet 3    b complex vitamins capsule Take 1 capsule by mouth once daily.      cholecalciferol, vitamin D3, (VITAMIN D3) 50 mcg (2,000 unit) Cap Take 1 capsule by mouth once daily.      coenzyme Q10 200 mg capsule Take 400 mg by mouth once daily.       fish oil-omega-3 fatty acids 300-1,000 mg capsule Take 2 g by mouth 2 (two) times daily.        furosemide (LASIX) 40 MG tablet Take 1 tablet (40 mg total) by mouth daily as needed (edema, swelling, fluid). 60 tablet 3    isosorbide mononitrate (IMDUR) 30 MG 24 hr tablet Take 1 tablet (30 mg total) by mouth every evening. 30 tablet 3    losartan (COZAAR) 50 MG tablet Take 1 tablet (50 mg total) by mouth 2 (two) times daily. 90 tablet 1    loteprednol (LOTEMAX) 0.5 % ophthalmic suspension Place 1 drop into the right eye once daily. Right eye only 5 mL 11    loteprednol etabonate (LOTEMAX SM) 0.38 % DrpG Place 1 drop into both eyes once daily. 10 g 3    metoprolol tartrate (LOPRESSOR) 25 MG tablet TAKE 0.5 TABLETS (12.5 MG TOTAL) BY MOUTH 2 (TWO) TIMES DAILY. 90 tablet 0    netarsudiL (RHOPRESSA) 0.02 % ophthalmic solution Place 1 drop into the left eye every evening. 2.5 mL 12    POLYETHYLENE GLYCOL 3350 (MIRALAX ORAL) Take 17 g by mouth 2 (two) times a day. Taking BID      travoprost (TRAVATAN Z) 0.004 % ophthalmic solution PLACE 1 DROP INTO THE LEFT EYE EVERY EVENING. 2.5 mL 10    [DISCONTINUED] hydroCHLOROthiazide (HYDRODIURIL) 12.5 MG Tab Take 1 tablet (12.5 mg total) by mouth once daily. 90 tablet 1     Current Facility-Administered Medications on File Prior to Visit   Medication Dose Route Frequency Provider Last Rate Last Admin    sodium chloride 0.9% flush 3 mL  3 mL Intravenous PRN Steve Galarza PA-C           Medications have been reviewed and reconciled with patient at visit today.    Barriers to medications present (no )    Adverse reactions to current medications (no)      Exam:  Vitals:    04/25/22 1138   BP:    Pulse:    Temp: 99.3 °F (37.4 °C)     Weight: 86 kg (189 lb 9.5 oz)   Body mass index is 32.54 kg/m².      BP Readings from Last 3 Encounters:   04/25/22 (!) 144/72   04/07/22 (!) 179/81   03/24/22 (!) 162/76     Wt Readings from Last 3 Encounters:   04/25/22 1136 86 kg (189 lb 9.5 oz)   04/07/22 1248 86.2 kg (190 lb 0.6 oz)   03/24/22 1306 87.2 kg (192 lb 3.9 oz)             Physical Exam  Constitutional:       General: She is not in acute distress.  HENT:      Mouth/Throat:      Mouth: Mucous membranes are moist.   Eyes:      General: No scleral icterus.  Cardiovascular:      Rate and Rhythm: Normal rate and regular rhythm.      Heart sounds: Normal heart sounds.   Pulmonary:      Effort: Pulmonary effort is normal.      Breath sounds: Normal breath sounds.   Abdominal:      Palpations: Abdomen is soft.      Tenderness: There is no abdominal tenderness. There is no right CVA tenderness or left CVA tenderness.   Musculoskeletal:         General: Swelling (trace BLE edema) present.      Cervical back: Neck supple.   Skin:     General: Skin is warm and dry.   Neurological:      Mental Status: She is alert. Mental status is at baseline.         Laboratory Reviewed: (Yes)  Lab Results   Component Value Date    WBC 7.55 01/24/2022    HGB 13.0 01/24/2022    HCT 41.1 01/24/2022     01/24/2022    CHOL 232 (H) 07/19/2021    TRIG 141 07/19/2021    HDL 48 07/19/2021    ALT 16 01/24/2022    AST 21 01/24/2022     01/24/2022    K 4.1 01/24/2022     01/24/2022    CREATININE 1.4 01/24/2022    BUN 28 (H) 01/24/2022    CO2 22 (L) 01/24/2022    TSH 1.412 01/24/2022    INR 1.0 02/12/2020    HGBA1C 5.9 (H) 03/08/2021           Health Maintenance  Health Maintenance Topics with due status: Not Due       Topic Last Completion Date    DEXA Scan 10/19/2020    Lipid Panel 07/19/2021    Pneumococcal Vaccines (Age 65+) 03/16/2022     Health Maintenance Due   Topic Date Due    Shingles Vaccine (1 of 2) Never done    TETANUS VACCINE  11/09/2021    COVID-19 Vaccine (4 - Booster for Pfizer series) 12/29/2021       Assessment:  Problem List Items Addressed This Visit        Cardiac/Vascular    HTN (hypertension) - Primary (Chronic)    Relevant Medications    hydroCHLOROthiazide (HYDRODIURIL) 25 MG tablet    cloNIDine (CATAPRES) 0.1 MG tablet       Renal/    Other microscopic hematuria      Incidental renal cyst last year not requiring f/u.   Now with uncontrolled HTN which could be explained by med change. Repeat RFP.  Previously seen by Dr Cortez.   May need to refer to new nephrologist.              Other Visit Diagnoses     Urinary frequency        Relevant Orders    Urinalysis, Reflex to Urine Culture Urine, Clean Catch (Completed)            Plan:  Primary hypertension  -     hydroCHLOROthiazide (HYDRODIURIL) 25 MG tablet; Take 1 tablet (25 mg total) by mouth once daily.  Dispense: 30 tablet; Refill: 11  -     cloNIDine (CATAPRES) 0.1 MG tablet; Take 1 tablet (0.1 mg total) by mouth 2 (two) times daily as needed (systolic BP over 180).  Dispense: 20 tablet; Refill: 1    Urinary frequency  -     Urinalysis, Reflex to Urine Culture Urine, Clean Catch    Other microscopic hematuria    Other orders  -     Urinalysis Microscopic      -Patient's lab results were reviewed and discussed with patient  -Treatment options and alternatives were discussed with the patient. Patient expressed understanding. Patient was given the opportunity to ask questions and be an active participant in their medical care. Patient had no further questions or concerns at this time.   -Documentation of patient's health and condition was obtained from family member who was present during visit.  -Patient is an overall moderate risk for health complications from their medical conditions.       Follow up: Follow up in about 4 weeks (around 5/23/2022) for HTN management, schedule with Dr Deleon. .      After visit summary printed and given to patient upon discharge.  Patient goals and care plan are included in After visit summary.    Total medical decision making time was 56 min.  The following issues were discussed: The primary encounter diagnosis was Primary hypertension. Diagnoses of Urinary frequency and Other microscopic hematuria were also pertinent to this visit.    Health maintenance needs, recent test results and goals  of care discussed with pt and questions answered.

## 2022-04-25 NOTE — TELEPHONE ENCOUNTER
----- Message from Kathryn Pruitt sent at 4/25/2022  8:14 AM CDT -----  Contact: [phone]  Patient would like to consult with a nurse in regards to her current symptoms. She stated she can possible have a bladder infection. Also she is having b/p concerns please call back at [phone]. Thanks r/s

## 2022-04-25 NOTE — TELEPHONE ENCOUNTER
Patient call returned. She stated that she wanted to be seen for her possible UTI & her blood pressure that keep fluctuating. Patient was scheduled to be seen today for 11:30 am & verbally understood the appointment information.

## 2022-04-25 NOTE — PROGRESS NOTES
No evidence of infection in urine but blood is present. We should check labs for kidney function, preferably before 5/10/22. Please let us know when we can schedule labs.

## 2022-04-25 NOTE — ASSESSMENT & PLAN NOTE
Incidental renal cyst last year not requiring f/u.   Now with uncontrolled HTN which could be explained by med change. Repeat RFP.  Previously seen by Dr Cortez.   May need to refer to new nephrologist.

## 2022-04-26 ENCOUNTER — TELEPHONE (OUTPATIENT)
Dept: PRIMARY CARE CLINIC | Facility: CLINIC | Age: 87
End: 2022-04-26
Payer: COMMERCIAL

## 2022-04-26 ENCOUNTER — LAB VISIT (OUTPATIENT)
Dept: LAB | Facility: HOSPITAL | Age: 87
End: 2022-04-26
Payer: COMMERCIAL

## 2022-04-26 DIAGNOSIS — I10 PRIMARY HYPERTENSION: ICD-10-CM

## 2022-04-26 DIAGNOSIS — M62.81 MUSCLE WEAKNESS OF EXTREMITY: ICD-10-CM

## 2022-04-26 DIAGNOSIS — R76.8 POSITIVE ANA (ANTINUCLEAR ANTIBODY): ICD-10-CM

## 2022-04-26 DIAGNOSIS — E53.8 B12 DEFICIENCY: ICD-10-CM

## 2022-04-26 DIAGNOSIS — M79.10 MYALGIA: ICD-10-CM

## 2022-04-26 LAB
ALBUMIN SERPL BCP-MCNC: 3.7 G/DL (ref 3.5–5.2)
ANION GAP SERPL CALC-SCNC: 11 MMOL/L (ref 8–16)
BUN SERPL-MCNC: 27 MG/DL (ref 8–23)
C3 SERPL-MCNC: 125 MG/DL (ref 50–180)
C4 SERPL-MCNC: 35 MG/DL (ref 11–44)
CALCIUM SERPL-MCNC: 9.7 MG/DL (ref 8.7–10.5)
CHLORIDE SERPL-SCNC: 105 MMOL/L (ref 95–110)
CO2 SERPL-SCNC: 27 MMOL/L (ref 23–29)
CREAT SERPL-MCNC: 1.4 MG/DL (ref 0.5–1.4)
CREAT UR-MCNC: 124 MG/DL (ref 15–325)
CRP SERPL-MCNC: 4 MG/L (ref 0–8.2)
EST. GFR  (AFRICAN AMERICAN): 38 ML/MIN/1.73 M^2
EST. GFR  (NON AFRICAN AMERICAN): 33 ML/MIN/1.73 M^2
GLUCOSE SERPL-MCNC: 142 MG/DL (ref 70–110)
PHOSPHATE SERPL-MCNC: 3.6 MG/DL (ref 2.7–4.5)
POTASSIUM SERPL-SCNC: 3.9 MMOL/L (ref 3.5–5.1)
PROT UR-MCNC: 9 MG/DL (ref 0–15)
PROT/CREAT UR: 0.07 MG/G{CREAT} (ref 0–0.2)
SODIUM SERPL-SCNC: 143 MMOL/L (ref 136–145)

## 2022-04-26 PROCEDURE — 86160 COMPLEMENT ANTIGEN: CPT | Mod: HCNC

## 2022-04-26 PROCEDURE — 84156 ASSAY OF PROTEIN URINE: CPT | Mod: HCNC

## 2022-04-26 PROCEDURE — 80069 RENAL FUNCTION PANEL: CPT | Mod: HCNC | Performed by: NURSE PRACTITIONER

## 2022-04-26 PROCEDURE — 82085 ASSAY OF ALDOLASE: CPT | Mod: HCNC

## 2022-04-26 PROCEDURE — 83516 IMMUNOASSAY NONANTIBODY: CPT | Mod: 59,HCNC

## 2022-04-26 PROCEDURE — 86140 C-REACTIVE PROTEIN: CPT | Mod: HCNC | Performed by: NURSE PRACTITIONER

## 2022-04-26 PROCEDURE — 36415 COLL VENOUS BLD VENIPUNCTURE: CPT | Mod: HCNC | Performed by: NURSE PRACTITIONER

## 2022-04-26 PROCEDURE — 83921 ORGANIC ACID SINGLE QUANT: CPT | Mod: HCNC | Performed by: NURSE PRACTITIONER

## 2022-04-26 PROCEDURE — 82607 VITAMIN B-12: CPT | Mod: HCNC | Performed by: NURSE PRACTITIONER

## 2022-04-26 PROCEDURE — 86235 NUCLEAR ANTIGEN ANTIBODY: CPT | Mod: 59,HCNC

## 2022-04-26 PROCEDURE — 86160 COMPLEMENT ANTIGEN: CPT | Mod: 59,HCNC

## 2022-04-26 PROCEDURE — 82397 CHEMILUMINESCENT ASSAY: CPT | Mod: HCNC

## 2022-04-26 NOTE — TELEPHONE ENCOUNTER
----- Message from Maddi Mccrary sent at 4/26/2022  8:16 AM CDT -----  Pt is requesting a call back in regards to some test that she need to get started on. Pt can be reached at [phone] ([email or web address])

## 2022-04-26 NOTE — TELEPHONE ENCOUNTER
Pt states she would like to do her lab work today if possible that Heather ordered. Are the labs that she needs linked to 5/10/2022 appt? And can she get them done today?

## 2022-04-27 LAB
ALDOLASE SERPL-CCNC: 5 U/L (ref 1.2–7.6)
VIT B12 SERPL-MCNC: 339 PG/ML (ref 210–950)
VIT B12 SERPL-MCNC: 339 PG/ML (ref 210–950)

## 2022-04-29 LAB — HMGCR IGG SER IA-ACNC: <20 CU

## 2022-05-02 LAB
METHYLMALONATE SERPL-SCNC: 0.44 UMOL/L

## 2022-05-06 ENCOUNTER — OFFICE VISIT (OUTPATIENT)
Dept: DERMATOLOGY | Facility: CLINIC | Age: 87
End: 2022-05-06
Payer: COMMERCIAL

## 2022-05-06 DIAGNOSIS — L90.5 SCAR CONDITIONS/SKIN FIBROSIS: ICD-10-CM

## 2022-05-06 DIAGNOSIS — Z85.828 HISTORY OF SKIN CANCER: ICD-10-CM

## 2022-05-06 DIAGNOSIS — D48.5 NEOPLASM OF UNCERTAIN BEHAVIOR OF SKIN: Primary | ICD-10-CM

## 2022-05-06 PROCEDURE — 1159F PR MEDICATION LIST DOCUMENTED IN MEDICAL RECORD: ICD-10-PCS | Mod: HCNC,CPTII,S$GLB, | Performed by: DERMATOLOGY

## 2022-05-06 PROCEDURE — 99499 NO LOS: ICD-10-PCS | Mod: HCNC,S$GLB,, | Performed by: DERMATOLOGY

## 2022-05-06 PROCEDURE — 1126F PR PAIN SEVERITY QUANTIFIED, NO PAIN PRESENT: ICD-10-PCS | Mod: HCNC,CPTII,S$GLB, | Performed by: DERMATOLOGY

## 2022-05-06 PROCEDURE — 3288F PR FALLS RISK ASSESSMENT DOCUMENTED: ICD-10-PCS | Mod: HCNC,CPTII,S$GLB, | Performed by: DERMATOLOGY

## 2022-05-06 PROCEDURE — 99499 UNLISTED E&M SERVICE: CPT | Mod: HCNC,S$GLB,, | Performed by: DERMATOLOGY

## 2022-05-06 PROCEDURE — 88305 TISSUE EXAM BY PATHOLOGIST: ICD-10-PCS | Mod: 26,HCNC,, | Performed by: PATHOLOGY

## 2022-05-06 PROCEDURE — 1126F AMNT PAIN NOTED NONE PRSNT: CPT | Mod: HCNC,CPTII,S$GLB, | Performed by: DERMATOLOGY

## 2022-05-06 PROCEDURE — 11102 TANGNTL BX SKIN SINGLE LES: CPT | Mod: HCNC,S$GLB,, | Performed by: DERMATOLOGY

## 2022-05-06 PROCEDURE — 1159F MED LIST DOCD IN RCRD: CPT | Mod: HCNC,CPTII,S$GLB, | Performed by: DERMATOLOGY

## 2022-05-06 PROCEDURE — 11102 PR TANGENTIAL BIOPSY, SKIN, SINGLE LESION: ICD-10-PCS | Mod: HCNC,S$GLB,, | Performed by: DERMATOLOGY

## 2022-05-06 PROCEDURE — 99999 PR PBB SHADOW E&M-EST. PATIENT-LVL IV: ICD-10-PCS | Mod: PBBFAC,HCNC,, | Performed by: DERMATOLOGY

## 2022-05-06 PROCEDURE — 1101F PR PT FALLS ASSESS DOC 0-1 FALLS W/OUT INJ PAST YR: ICD-10-PCS | Mod: HCNC,CPTII,S$GLB, | Performed by: DERMATOLOGY

## 2022-05-06 PROCEDURE — 3288F FALL RISK ASSESSMENT DOCD: CPT | Mod: HCNC,CPTII,S$GLB, | Performed by: DERMATOLOGY

## 2022-05-06 PROCEDURE — 99999 PR PBB SHADOW E&M-EST. PATIENT-LVL IV: CPT | Mod: PBBFAC,HCNC,, | Performed by: DERMATOLOGY

## 2022-05-06 PROCEDURE — 1160F RVW MEDS BY RX/DR IN RCRD: CPT | Mod: HCNC,CPTII,S$GLB, | Performed by: DERMATOLOGY

## 2022-05-06 PROCEDURE — 1160F PR REVIEW ALL MEDS BY PRESCRIBER/CLIN PHARMACIST DOCUMENTED: ICD-10-PCS | Mod: HCNC,CPTII,S$GLB, | Performed by: DERMATOLOGY

## 2022-05-06 PROCEDURE — 88305 TISSUE EXAM BY PATHOLOGIST: CPT | Mod: 26,HCNC,, | Performed by: PATHOLOGY

## 2022-05-06 PROCEDURE — 88305 TISSUE EXAM BY PATHOLOGIST: CPT | Mod: HCNC | Performed by: PATHOLOGY

## 2022-05-06 PROCEDURE — 1101F PT FALLS ASSESS-DOCD LE1/YR: CPT | Mod: HCNC,CPTII,S$GLB, | Performed by: DERMATOLOGY

## 2022-05-06 NOTE — PATIENT INSTRUCTIONS
Shave Biopsy Wound Care    Your doctor has performed a shave biopsy today.  A band aid and vaseline ointment has been placed over the site.  This should remain in place for 24 hours.  It is recommended that you keep the area dry for the first 24 hours.  After 24 hours, you may remove the band aid and wash the area with warm soap and water and apply Vaseline jelly.  Many patients prefer to use Neosporin or Bacitracin ointment.  This is acceptable; however, know that you can develop an allergy to this medication even if you have used it safely for years.  It is important to keep the area moist.  Letting it dry out and get air slows healing time, and will worsen the scar.  Band aid is optional after first 24 hours.      If you notice increasing redness, tenderness, pain, or yellow drainage at the biopsy site, please notify your doctor.  These are signs of an infection.    If your biopsy site is bleeding, apply firm pressure for 15 minutes straight.  Repeat for another 15 minutes, if it is still bleeding.   If the surgical site continues to bleed, then please contact your doctor.      [facility] - [facility]   DERMATOLOGY  [address]   [place] [address]   Dept: [phone]   Dept Fax: [phone]

## 2022-05-06 NOTE — PROGRESS NOTES
Subjective:       Patient ID:  Shirley Metz is a 89 y.o. 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  She c/o new lesion of the right posterior knee.  + irritation and catching on clothes.     This is a high risk patient here to check for the development of new lesions. Denies bleeding, tender, growing, or concerning lesions.       Review of Systems   Constitutional: Negative for fever and chills.   Gastrointestinal: Negative for nausea and vomiting.   Skin: Positive for activity-related sunscreen use. Negative for daily sunscreen use and recent sunburn.   Hematologic/Lymphatic: Does not bruise/bleed easily.        Objective:    Physical Exam   Constitutional: She appears well-developed and well-nourished. No distress.   Neurological: She is alert and oriented to person, place, and time. She is not disoriented.   Psychiatric: She has a normal mood and affect.   Skin:   Areas Examined (abnormalities noted in diagram):   Head / Face Inspection Performed  Neck Inspection Performed  RUE Inspected  LUE Inspection Performed  RLE Inspected  Nails and Digits Inspection Performed                     Assessment / Plan:      Pathology Orders:     Normal Orders This Visit    Specimen to Pathology, Dermatology     Questions:    Procedure Type: Dermatology and skin neoplasms    Number of Specimens: 1    ------------------------: -------------------------    Spec 1 Procedure: Biopsy    Spec 1 Clinical Impression: r/o ISK vs. other    Spec 1 Source: right posterior knee    Clinical Information: see above    Release to patient: Immediate        Neoplasm of uncertain behavior of skin  -     Specimen to Pathology, Dermatology  -     Shave biopsy(-ies)  done of 1 site(s).   Patient informed to call for results within 2 weeks if have not received notification via telephone call or MyChart    Scar conditions/skin fibrosis  History of skin cancer  Pt refuse skin exam, denies concerning lesions, discussed should perform at next f/u.                Follow up for call for results.     PROCEDURE NOTE - SHAVE BIOPSY   Location: see above    After risk, benefits, and alternatives were discussed with the patient, the patient agrees to the procedure by verbal informed consent.  The area(s) were cleansed with alcohol. 2 cc of lidocaine 1% with epinephrine was injected for local anesthesia into each lesion(s).  A sharp dermablade was used to remove part or all of the lesion(s).  The specimen(s) will be sent for tissue pathology.  Hemostasis was obtained with aluminum chloride and/or hyfrecation.  The area(s) were dressed with vaseline ointment and bandaged.  The patient tolerated the procedure well without adverse events.  Wound care instructions were given to the patient on the AVS.  The patient will be notified of pathology results once available. Results will also be available in Epic.

## 2022-05-10 ENCOUNTER — OFFICE VISIT (OUTPATIENT)
Dept: OPHTHALMOLOGY | Facility: CLINIC | Age: 87
End: 2022-05-10
Payer: COMMERCIAL

## 2022-05-10 ENCOUNTER — TELEPHONE (OUTPATIENT)
Dept: RHEUMATOLOGY | Facility: CLINIC | Age: 87
End: 2022-05-10
Payer: COMMERCIAL

## 2022-05-10 DIAGNOSIS — M35.01 KERATITIS SICCA, BILATERAL: ICD-10-CM

## 2022-05-10 DIAGNOSIS — H20.9 UVEITIS: ICD-10-CM

## 2022-05-10 DIAGNOSIS — H52.7 REFRACTIVE ERROR: ICD-10-CM

## 2022-05-10 DIAGNOSIS — H40.1131 PRIMARY OPEN ANGLE GLAUCOMA (POAG) OF BOTH EYES, MILD STAGE: Primary | ICD-10-CM

## 2022-05-10 DIAGNOSIS — Z96.1 PSEUDOPHAKIA: ICD-10-CM

## 2022-05-10 DIAGNOSIS — Z94.7 HISTORY OF CORNEA TRANSPLANT: ICD-10-CM

## 2022-05-10 PROCEDURE — 1159F MED LIST DOCD IN RCRD: CPT | Mod: HCNC,CPTII,S$GLB, | Performed by: OPHTHALMOLOGY

## 2022-05-10 PROCEDURE — 92015 PR REFRACTION: ICD-10-PCS | Mod: HCNC,S$GLB,, | Performed by: OPHTHALMOLOGY

## 2022-05-10 PROCEDURE — 99214 OFFICE O/P EST MOD 30 MIN: CPT | Mod: HCNC,S$GLB,, | Performed by: OPHTHALMOLOGY

## 2022-05-10 PROCEDURE — 99214 PR OFFICE/OUTPT VISIT, EST, LEVL IV, 30-39 MIN: ICD-10-PCS | Mod: HCNC,S$GLB,, | Performed by: OPHTHALMOLOGY

## 2022-05-10 PROCEDURE — 1160F PR REVIEW ALL MEDS BY PRESCRIBER/CLIN PHARMACIST DOCUMENTED: ICD-10-PCS | Mod: HCNC,CPTII,S$GLB, | Performed by: OPHTHALMOLOGY

## 2022-05-10 PROCEDURE — 1160F RVW MEDS BY RX/DR IN RCRD: CPT | Mod: HCNC,CPTII,S$GLB, | Performed by: OPHTHALMOLOGY

## 2022-05-10 PROCEDURE — 1159F PR MEDICATION LIST DOCUMENTED IN MEDICAL RECORD: ICD-10-PCS | Mod: HCNC,CPTII,S$GLB, | Performed by: OPHTHALMOLOGY

## 2022-05-10 PROCEDURE — 92133 CPTRZD OPH DX IMG PST SGM ON: CPT | Mod: HCNC,S$GLB,, | Performed by: OPHTHALMOLOGY

## 2022-05-10 PROCEDURE — 92133 POSTERIOR SEGMENT OCT OPTIC NERVE(OCULAR COHERENCE TOMOGRAPHY) - OU - BOTH EYES: ICD-10-PCS | Mod: HCNC,S$GLB,, | Performed by: OPHTHALMOLOGY

## 2022-05-10 PROCEDURE — 99999 PR PBB SHADOW E&M-EST. PATIENT-LVL II: CPT | Mod: PBBFAC,HCNC,, | Performed by: OPHTHALMOLOGY

## 2022-05-10 PROCEDURE — 99999 PR PBB SHADOW E&M-EST. PATIENT-LVL II: ICD-10-PCS | Mod: PBBFAC,HCNC,, | Performed by: OPHTHALMOLOGY

## 2022-05-10 PROCEDURE — 92015 DETERMINE REFRACTIVE STATE: CPT | Mod: HCNC,S$GLB,, | Performed by: OPHTHALMOLOGY

## 2022-05-10 NOTE — TELEPHONE ENCOUNTER
----- Message from Abril Mccrary sent at 5/10/2022  7:52 AM CDT -----  Contact: Shirley Watson would like a call back at [phone], Regards to why was her labs cancel.    Thanks  Td

## 2022-05-10 NOTE — TELEPHONE ENCOUNTER
That is fine regarding labs.     If she could have [facility] send us the results of her xrays that would be great.

## 2022-05-10 NOTE — TELEPHONE ENCOUNTER
"Contacted patient to discuss this with her. Advised patient that the lab work that was scheduled for today was canceled because she had the labs done on 04/26/2022. Patient verbalized understanding but stated that  Ami Serna PA-C had ordered xrays for her hands but she canceled them due to her having x[facility] done [facility] and her not wanting to be exposed to any more radiation. Advised patient that I will send this message to  Ami Serna PA-C for further instruction and someone will call her back once we hear something. Patient verbalized understanding. All questions answered.     Ami Nuno MA ([facility])  Rheumatology Department  "

## 2022-05-10 NOTE — PROGRESS NOTES
SUBJECTIVE  AdrianaIfrah Metz is 89 y.o. female  Corrected distance visual acuity was 20/25 in the right eye and 20/30+ in the left eye.   Chief Complaint   Patient presents with    Glaucoma     4 month GOCT and IOP          HPI     Glaucoma      Additional comments: 4 month GOCT and IOP              Comments     States that her vision is stable and that she has been compliant with   gtts as directed. States that she needs an updated script for near and   painting states that her distance glasses are great.     1. Mild COAG Goal < 19   SLT OD 3/04 (20 to 15) + 3/11 (19 to 15)   SLT OS 5/05 (20 to 14) +5/11 (18-19 to 15) + 3/12 (min resp.) + 3/11/15   (20.5-17.5) + 3/7/18 (24- 21) + 11/3/21 (22.5-17.5)  (Cosopt, Xalatan, and Timolol cause Myalgias)   (Alphagan causes redness) (zioptan too expensive)   Possible steroid responder   2. PCIOL OU   3. Mild Dry AMD   4. Fuch's Dystrophy   DSAEK OD 4/16 Dr. Quintanilla (followed by [facility] every summer)   DSAEK OS 8/15 Dr. Quintanilla   Rx Inveltys (1% lotemax) (IOP 15/23) 5/29/20   5. Dry Eye -intolerance to Restasis   6. Recent A-Fib. (from Labetolol ?)         Lotemax OU: Mon, Wed,Fri and Sun once daily   Systane Ultra 4-5 tiimes daily   Travatan Z qhs os   Rhopressa qhs OS     +HAG            Last edited by France Jason on 5/10/2022  2:27 PM. (History)         Assessment /Plan :  1. Primary open angle glaucoma (POAG) of both eyes, mild stage     Doing well, IOP OD within acceptable range relative to target IOP and no evidence of progression. Continue current treatment. Reviewed importance of continued compliance with treatment and follow up.    IOP OS not within acceptable range relative to target IOP with risk of irreversible visual loss. Better IOP control is recommended. Discussed options, risks, and benefits of additional medication, SLT laser, and/or incisional glaucoma surgery. Reviewed importance of continued compliance with treatment and follow up.      Patient instructed to continue using the following glaucoma medication as follows:  Rhopressa one drop in the left eye nightly and Travatan Z one drop in the left eye every night  Continue Lotemax one drop in the right eye once daily on Mondays, Wednesdays, Fridays and Sundays  Add Lotemax one drop in the left eye 2 times a day    Return to clinic in 3 weeks  or as needed.  With IOP Check       2. Uveitis add Lotemax one drop in the left eye 2 times a day   3. Keratitis sicca, bilateral  -- Condition stable, no therapeutic change required. Monitoring routinely.     4. Pseudophakia  -- Condition stable, no therapeutic change required. Monitoring routinely.     5. History of cornea transplant    6.      Refractive Error Rx for readers

## 2022-05-11 ENCOUNTER — CLINICAL SUPPORT (OUTPATIENT)
Dept: DERMATOLOGY | Facility: CLINIC | Age: 87
End: 2022-05-11
Payer: COMMERCIAL

## 2022-05-11 ENCOUNTER — TELEPHONE (OUTPATIENT)
Dept: DERMATOLOGY | Facility: CLINIC | Age: 87
End: 2022-05-11

## 2022-05-11 DIAGNOSIS — L08.9 SKIN INFECTION: Primary | ICD-10-CM

## 2022-05-11 LAB
ANTI-PM/SCL AB: <20 UNITS
ANTI-SS-A 52 KD AB, IGG: <20 UNITS
EJ AB SER QL: NEGATIVE
ENA JO1 AB SER IA-ACNC: <20 UNITS
ENA SM+RNP AB SER IA-ACNC: <20 UNITS
FIBRILLARIN (U3 RNP): NEGATIVE
KU AB SER QL: NEGATIVE
MDA-5 (P140): <20 UNITS
MI2 AB SER QL: NEGATIVE
NXP-2 (P140): <20 UNITS
OJ AB SER QL: NEGATIVE
PL12 AB SER QL: NEGATIVE
PL7 AB SER QL: NEGATIVE
SRP AB SERPL QL: NEGATIVE
TIF1 GAMMA (P155/140): <20 UNITS
U2 SNRNP: NEGATIVE

## 2022-05-11 PROCEDURE — 99999 PR PBB SHADOW E&M-EST. PATIENT-LVL II: ICD-10-PCS | Mod: PBBFAC,HCNC,,

## 2022-05-11 PROCEDURE — 87070 CULTURE OTHR SPECIMN AEROBIC: CPT | Mod: HCNC | Performed by: DERMATOLOGY

## 2022-05-11 PROCEDURE — 99024 PR POST-OP FOLLOW-UP VISIT: ICD-10-PCS | Mod: HCNC,S$GLB,, | Performed by: DERMATOLOGY

## 2022-05-11 PROCEDURE — 99999 PR PBB SHADOW E&M-EST. PATIENT-LVL II: CPT | Mod: PBBFAC,HCNC,,

## 2022-05-11 PROCEDURE — 99024 POSTOP FOLLOW-UP VISIT: CPT | Mod: HCNC,S$GLB,, | Performed by: DERMATOLOGY

## 2022-05-11 RX ORDER — MUPIROCIN 20 MG/G
OINTMENT TOPICAL
Qty: 30 G | Refills: 1 | Status: SHIPPED | OUTPATIENT
Start: 2022-05-11 | End: 2022-10-13

## 2022-05-11 RX ORDER — CEPHALEXIN 500 MG/1
500 CAPSULE ORAL EVERY 12 HOURS
Qty: 20 CAPSULE | Refills: 0 | Status: SHIPPED | OUTPATIENT
Start: 2022-05-11 | End: 2022-05-21

## 2022-05-11 NOTE — TELEPHONE ENCOUNTER
----- Message from Douglas Bronson sent at 5/11/2022  9:40 AM CDT -----  Contact: [facility]  [facility] is calling to speak with the nurse regarding two prescriptions patient is currently waiting at their location. Reports according to patient both a cream and antibiotic were to be sent to their pharmacy from office visit this morning. Pleas give them a high priority call back at [phone].   Thanks,  RP

## 2022-05-11 NOTE — TELEPHONE ENCOUNTER
Called pt and advised pt regarding recommendations from [facility]. Pt said that she doesn't think that they will send us the results  but she has the pictures of the xrays. Advised pt that if she could call them that would be great but she said she doesn't think they will fax them. Pt said [facility] didn't mention the xray at her last visit.

## 2022-05-11 NOTE — PROGRESS NOTES
Patient came in to get biopsy site re-checked behind knee.  + erythema, no drainage.  Pt used triple antibiotic x 1.  Pt with eGFR of 33 and numerous medication allergies (doxycycline - muscle weakness, macrolide).  Denies allergy to PCN/cephalosporin. Will start renal dose keflex 500 mg bid x 10 days.  Will start mupirocin bid to wound. Reviewed wound care, avoid neosporin/triple antibiotic.

## 2022-05-11 NOTE — ADDENDUM NOTE
Addended by: JANETTE COVINGTON on: 5/11/2022 01:08 PM     Modules accepted: Orders, Level of Service

## 2022-05-12 LAB
FINAL PATHOLOGIC DIAGNOSIS: NORMAL
GROSS: NORMAL
Lab: NORMAL
MICROSCOPIC EXAM: NORMAL

## 2022-05-14 LAB — BACTERIA SPEC AEROBE CULT: NORMAL

## 2022-05-18 ENCOUNTER — CLINICAL SUPPORT (OUTPATIENT)
Dept: DERMATOLOGY | Facility: CLINIC | Age: 87
End: 2022-05-18
Payer: COMMERCIAL

## 2022-05-18 DIAGNOSIS — Z09 FOLLOW UP: Primary | ICD-10-CM

## 2022-05-18 PROCEDURE — 99024 PR POST-OP FOLLOW-UP VISIT: ICD-10-PCS | Mod: HCNC,S$GLB,, | Performed by: DERMATOLOGY

## 2022-05-18 PROCEDURE — 99024 POSTOP FOLLOW-UP VISIT: CPT | Mod: HCNC,S$GLB,, | Performed by: DERMATOLOGY

## 2022-05-18 NOTE — PROGRESS NOTES
PHYSICAL THERAPY Daily Note    Referring Provider:  Dr. Solomon Lujan    Diagnosis:       ICD-10-CM ICD-9-CM    1. Status post total shoulder arthroplasty, left Z96.612 V43.61             Orders:  Evaluate and Treat    Date of Initial Evaluation: 10-24-18      Visit # 25    SUBJECTIVE:  Pt. Reports more pain in L shoulder that's achy.  Pt. Reports discomfort in L upper arm.        Current Pain:  Not reported today    OBJECTIVE:      ASSESSMENT: Pt. Reports pain with L shoulder internal rotation.  Pt. Has muscle tension in L bicep region a few days most likely secondary functional strengthening began last visit. Withheld throwing activities and cont. Diagonal strengthening without resistance.        TREATMENT PROVIDED:  -Manual Therapy:  L shoulder PROM  X 5 min., STM x 8 min. To L biceps, L shoulder joint mob x 2 min.      -Therapeutic Exercise: one on one 19 min.: L shoulder AROM x 5 min., D1 flex x 2 min., D2 flex x 2 min., middle trap x 2 min., lower trap x 2 min., ext. Rot x 2 min., int. Rot x 2 min., scap retraction x 2min.      -Modalities: N/A  -Education on condition and posture, activity modification    PLAN:  Patient will benefit from physical therapy (2-3) x/week for (4-6) weeks including manual therapy, therapeutic exercise, functional activities, modalities, and patient education.    Thank you for this referral.    These services are reasonable and necessary for the conditions set forth above while under my care.   Admitted

## 2022-05-23 ENCOUNTER — OFFICE VISIT (OUTPATIENT)
Dept: PRIMARY CARE CLINIC | Facility: CLINIC | Age: 87
End: 2022-05-23
Payer: COMMERCIAL

## 2022-05-23 VITALS
HEART RATE: 75 BPM | OXYGEN SATURATION: 95 % | SYSTOLIC BLOOD PRESSURE: 102 MMHG | TEMPERATURE: 97 F | HEIGHT: 64 IN | WEIGHT: 188.5 LBS | BODY MASS INDEX: 32.18 KG/M2 | DIASTOLIC BLOOD PRESSURE: 68 MMHG

## 2022-05-23 DIAGNOSIS — R31.29 OTHER MICROSCOPIC HEMATURIA: ICD-10-CM

## 2022-05-23 DIAGNOSIS — J30.2 SEASONAL ALLERGIC RHINITIS, UNSPECIFIED TRIGGER: ICD-10-CM

## 2022-05-23 DIAGNOSIS — M79.10 MYALGIA: ICD-10-CM

## 2022-05-23 DIAGNOSIS — I13.2 HYPERTENSIVE HEART AND RENAL DISEASE WITH BOTH (CONGESTIVE) HEART FAILURE AND RENAL FAILURE: ICD-10-CM

## 2022-05-23 DIAGNOSIS — I10 PRIMARY HYPERTENSION: Primary | ICD-10-CM

## 2022-05-23 DIAGNOSIS — H40.1131 CHRONIC OPEN ANGLE GLAUCOMA OF BOTH EYES, MILD STAGE: ICD-10-CM

## 2022-05-23 DIAGNOSIS — M15.9 PRIMARY OSTEOARTHRITIS INVOLVING MULTIPLE JOINTS: ICD-10-CM

## 2022-05-23 DIAGNOSIS — R91.8 PULMONARY NODULES/LESIONS, MULTIPLE: ICD-10-CM

## 2022-05-23 DIAGNOSIS — R73.03 PRE-DIABETES: ICD-10-CM

## 2022-05-23 DIAGNOSIS — N19 HYPERTENSIVE HEART AND RENAL DISEASE WITH BOTH (CONGESTIVE) HEART FAILURE AND RENAL FAILURE: ICD-10-CM

## 2022-05-23 PROCEDURE — 1101F PR PT FALLS ASSESS DOC 0-1 FALLS W/OUT INJ PAST YR: ICD-10-PCS | Mod: HCNC,CPTII,S$GLB, | Performed by: INTERNAL MEDICINE

## 2022-05-23 PROCEDURE — 1160F RVW MEDS BY RX/DR IN RCRD: CPT | Mod: HCNC,CPTII,S$GLB, | Performed by: INTERNAL MEDICINE

## 2022-05-23 PROCEDURE — 1125F AMNT PAIN NOTED PAIN PRSNT: CPT | Mod: HCNC,CPTII,S$GLB, | Performed by: INTERNAL MEDICINE

## 2022-05-23 PROCEDURE — 1125F PR PAIN SEVERITY QUANTIFIED, PAIN PRESENT: ICD-10-PCS | Mod: HCNC,CPTII,S$GLB, | Performed by: INTERNAL MEDICINE

## 2022-05-23 PROCEDURE — 3288F PR FALLS RISK ASSESSMENT DOCUMENTED: ICD-10-PCS | Mod: HCNC,CPTII,S$GLB, | Performed by: INTERNAL MEDICINE

## 2022-05-23 PROCEDURE — 3288F FALL RISK ASSESSMENT DOCD: CPT | Mod: HCNC,CPTII,S$GLB, | Performed by: INTERNAL MEDICINE

## 2022-05-23 PROCEDURE — 1160F PR REVIEW ALL MEDS BY PRESCRIBER/CLIN PHARMACIST DOCUMENTED: ICD-10-PCS | Mod: HCNC,CPTII,S$GLB, | Performed by: INTERNAL MEDICINE

## 2022-05-23 PROCEDURE — 99215 PR OFFICE/OUTPT VISIT, EST, LEVL V, 40-54 MIN: ICD-10-PCS | Mod: HCNC,S$GLB,, | Performed by: INTERNAL MEDICINE

## 2022-05-23 PROCEDURE — 1159F PR MEDICATION LIST DOCUMENTED IN MEDICAL RECORD: ICD-10-PCS | Mod: HCNC,CPTII,S$GLB, | Performed by: INTERNAL MEDICINE

## 2022-05-23 PROCEDURE — 99999 PR PBB SHADOW E&M-EST. PATIENT-LVL V: ICD-10-PCS | Mod: PBBFAC,HCNC,, | Performed by: INTERNAL MEDICINE

## 2022-05-23 PROCEDURE — 99215 OFFICE O/P EST HI 40 MIN: CPT | Mod: HCNC,S$GLB,, | Performed by: INTERNAL MEDICINE

## 2022-05-23 PROCEDURE — 99999 PR PBB SHADOW E&M-EST. PATIENT-LVL V: CPT | Mod: PBBFAC,HCNC,, | Performed by: INTERNAL MEDICINE

## 2022-05-23 PROCEDURE — 1101F PT FALLS ASSESS-DOCD LE1/YR: CPT | Mod: HCNC,CPTII,S$GLB, | Performed by: INTERNAL MEDICINE

## 2022-05-23 PROCEDURE — 1159F MED LIST DOCD IN RCRD: CPT | Mod: HCNC,CPTII,S$GLB, | Performed by: INTERNAL MEDICINE

## 2022-05-23 RX ORDER — HYDROCHLOROTHIAZIDE 12.5 MG/1
12.5 TABLET ORAL DAILY
Qty: 30 TABLET | Refills: 11 | Status: ON HOLD | OUTPATIENT
Start: 2022-05-23 | End: 2022-07-14 | Stop reason: HOSPADM

## 2022-05-23 NOTE — PATIENT INSTRUCTIONS
Advised hold losartan if SBP < 120    Cont low dose HCTZ (furosemide only if needed: 5 lbs weight gain w/in 1 week, increased LE edema, SOB, elevated BP despite other anti-hypertensive medication)    Cont metoprolol, imdur at bedtime    Discussed low carb options, intermittent fasting    F/u 2 mos (or earlier if needed)

## 2022-05-23 NOTE — PROGRESS NOTES
Shirley Metz  05/23/2022  [id number]    Heather Miller NP  Patient Care Team:  Heather Miller NP as PCP - General (Family Medicine)  Wilbert Ware MD as Consulting Physician (Ophthalmology)  Rajinder Quintanilla MD as Consulting Physician (Ophthalmology)  Suzan Gregorio PA-C as Physician Assistant (Rheumatology)  Rosario Valadez MD as Consulting Physician (Dermatology)  Trevon Ramirez MD as Consulting Physician (Cardiology)  Amish Mendoza MD (Pulmonary Disease)  Jaquan Choi MD as Consulting Physician (Vascular Surgery)  Debbie Choi MD (Inactive) as Consulting Physician (Gynecology)  Emmanuel Fish MD as Consulting Physician (Hand Surgery)  PAXTON Chaidez Jr., MD as Consulting Physician (Orthopedic Surgery)  Phylicia Deleon MD as Physician (Internal Medicine)    [facility] Primary Care Note    Chief Complaint:  Chief Complaint   Patient presents with    Follow-up       History of Present Illness:  Ms. Edmond is an 89 year old female w multiple chronic medical issues including Diastolic CHF, HTN, atherosclerosis, CKD, Afib on Eliquis, obesity, pre-diabetes, osteopenia, and OA.     She lives in [facility] - has been there 10 years. She is generally quite happy w the living situation there. Breakfast and lunch are provided but are not particularly healthy. Says it is a challenge to access/incorporate fresh fruits and vegetables.     Ms. Edmond reports development of myalgias and weakness which she attributes to cephalexin recently prescribed for possible post-biopsy skin infection. Feels she is still weak w myalgias from cephalexin but getting better now she is no longer taking. Was seen by Rheum Ami KURTZ 4/07 for myalgias, + VIRY (1:160 low titer, homogenous otherwise negative). She does have an appointment w [facility], Dr. Jiménez, June 8th. She reports B dupuytren's contractures, R carpal tunnel - s/p release procedure but bothering her again  "recently (using wrist splint helps). H/o surgery base of L thumb and 2nd finger and has had trapazoid bone removed L hand. Also w R shoulder osteoarthritis and s/p L reverse shoulder surgery.     Reports home BP's still fluctuating - highest SBP: 175. BP today 102/68. Denies lightheadedness or dizziness (and feels weakness is due to recent antibiotic Tx not low BP). Taking isosorbide QHS, HCTZ most mornings, furosemide once a week. She does have some swelling around the ankles today. Denies SOB, DUE at present.     Reviewed recent labwork: UA 1+ blood otherwise ok - has seen urologist in the past but not recently (3/2021 CT negative for renal stone). HgbA1c 5.9% (stable).    Per Ms. Bettencourt, she underwent a "full body scan" last year w no sig concerning findings. She reports lung biopsy in Feb at [facility] w Pulmoon, Dr. Mendoza. Reportedly pathology was inconclusive but not malignant and non-fungal. She is to f/u again in August.     Ophthal Jeanie 5/10 notes OS eye pressure elevated and new eyedrop(s) added. She will be seeing Ophthalmology, Dr. Alva, for further f/u next week.    Review of Systems   Constitutional: Positive for malaise/fatigue. Negative for chills and fever.   HENT: Positive for congestion. Negative for hearing loss and sore throat.    Eyes: Negative for pain.        Dry eyes   Respiratory: Negative for cough and shortness of breath.    Cardiovascular: Positive for leg swelling (mild, ankles). Negative for chest pain.   Gastrointestinal: Negative for heartburn, nausea and vomiting.   Genitourinary: Positive for frequency and urgency. Negative for dysuria.        1+ blood in urine but has not noted any gross hematuria   Musculoskeletal: Positive for myalgias (at baseline). Negative for falls.   Neurological: Negative for dizziness and headaches.   Endo/Heme/Allergies: Positive for environmental allergies. Negative for polydipsia.     The following were reviewed: Active problem list, " medication list, allergies, family history, social history, and Health Maintenance.     History:  Past Medical History:   Diagnosis Date    Anemia 11/29/2018    Anxiety 11/28/2017    Arthritis     Atrial fibrillation with RVR 10/9/2019    Back pain     Basal cell carcinoma 03/29/2018    left mid back    Chronic diastolic congestive heart failure 10/15/2019    CKD (chronic kidney disease) stage 3, GFR 30-59 ml/min 8/8/2016    Colon polyps     reported per patient.     Coronary artery calcification 10/5/2021    Fuchs' corneal dystrophy     General anesthetics causing adverse effect in therapeutic use     woke up during surgery and gagged on ET tube    Glaucoma     Glaucoma     Heart failure with preserved left ventricular function (HFpEF) 7/24/2018    Hyperlipidemia     Hypertension     Macular degeneration dry    Obesity     PVD (peripheral vascular disease) 4/26/2021    PVD (peripheral vascular disease) 4/26/2021    Squamous cell carcinoma 01/08/2019    left shoulder    Stenosis of carotid artery 10/5/2021    Trouble in sleeping     Type 2 diabetes mellitus without complication, without long-term current use of insulin 2/24/2021    Urinary tract infection      Past Surgical History:   Procedure Laterality Date    ADENOIDECTOMY  1938    BREAST BIOPSY Left     1997. benign    BREAST CYST EXCISION Left 1997 approx    CARPAL TUNNEL RELEASE Right 2012 approx    CATARACT EXTRACTION Bilateral 1994    CHOLECYSTECTOMY  1975    CORNEAL TRANSPLANT Bilateral 08/2015 8/2015 - left; Right 4/2016    EYE SURGERY      Fuch's Corneal dystrophy - had 2nd operation with Dr. Quintanilla    EYE SURGERY      Cataract wIOL    left thumb Left 2011    removed cuboid for arthritis    REVERSE TOTAL SHOULDER ARTHROPLASTY Left 8/21/2018    Procedure: ARTHROPLASTY, SHOULDER, TOTAL, REVERSE;  Surgeon: Solomon Lujan MD;  Location[facility];  Service: Orthopedics;  Laterality: Left;  WITH BICEP TENODESIS     SKIN CANCER EXCISION Left 2017    BCC removal by Dr. Valadez    SLT- OS (aka TRABECULOPLASTY) Left 05/11/2011    repeat 3/14/12    TENOTOMY Left 8/21/2018    Procedure: TENOTOMY;  Surgeon: Solomon Lujan MD;  Location: [facility] OR;  Service: Orthopedics;  Laterality: Left;    TONSILLECTOMY  1938    TREATMENT OF CARDIAC ARRHYTHMIA N/A 10/10/2019    Procedure: CARDIOVERSION;  Surgeon: Pete Durán MD;  Location: [facility] CATH LAB;  Service: Cardiology;  Laterality: N/A;    TREATMENT OF CARDIAC ARRHYTHMIA N/A 12/6/2019    Procedure: CARDIOVERSION;  Surgeon: Samy Castillo MD;  Location: [facility] CATH LAB;  Service: Cardiology;  Laterality: N/A;     Family History   Problem Relation Age of Onset    Heart disease Mother     Hypertension Mother     Cancer Father 88        sarcoma( ?Kaposi's ) on leg    Deep vein thrombosis Neg Hx     Ovarian cancer Neg Hx     Breast cancer Neg Hx     Kidney disease Neg Hx     Strabismus Neg Hx     Retinal detachment Neg Hx     Macular degeneration Neg Hx     Glaucoma Neg Hx     Blindness Neg Hx     Amblyopia Neg Hx     Eczema Neg Hx     Lupus Neg Hx     Melanoma Neg Hx     Psoriasis Neg Hx     Diabetes Neg Hx     Stroke Neg Hx     Mental retardation Neg Hx     Mental illness Neg Hx     Hyperlipidemia Neg Hx     COPD Neg Hx     Asthma Neg Hx     Depression Neg Hx     Alcohol abuse Neg Hx     Drug abuse Neg Hx      Social History     Socioeconomic History    Marital status:     Number of children: 3   Tobacco Use    Smoking status: Never Smoker    Smokeless tobacco: Never Used    Tobacco comment: history of passive smoking from her ex-   Substance and Sexual Activity    Alcohol use: Yes     Alcohol/week: 2.0 standard drinks     Types: 2 Standard drinks or equivalent per week     Comment: occ    Drug use: No    Sexual activity: Not Currently     Partners: Male     Birth control/protection: Post-menopausal     Comment: discussed protection for STD's    Other Topics Concern    Are you pregnant or think you may be? No    Breast-feeding No   Social History Narrative     x 2,  x 1. Retired artist - previously doing jewelry/wall pieces; ; lives in [facility]; has 3 sons - 52( lives in [place], 54, and 56;exercises minimally - limited due to back issues. Still drives. Does have a Living Will.     Social Determinants of Health     Financial Resource Strain: Low Risk     Difficulty of Paying Living Expenses: Not hard at all   Food Insecurity: No Food Insecurity    Worried About Running Out of Food in the Last Year: Never true    Ran Out of Food in the Last Year: Never true   Transportation Needs: No Transportation Needs    Lack of Transportation (Medical): No    Lack of Transportation (Non-Medical): No   Physical Activity: Insufficiently Active    Days of Exercise per Week: 3 days    Minutes of Exercise per Session: 10 min   Stress: No Stress Concern Present    Feeling of Stress : Not at all   Social Connections: Moderately Isolated    Frequency of Communication with Friends and Family: Three times a week    Frequency of Social Gatherings with Friends and Family: Three times a week    Attends [employer] Services: Never    Active Member of Clubs or Organizations: Yes    Attends Club or Organization Meetings: More than 4 times per year    Marital Status:    Housing Stability: Low Risk     Unable to Pay for Housing in the Last Year: No    Number of Places Lived in the Last Year: 1    Unstable Housing in the Last Year: No     Patient Active Problem List   Diagnosis    HTN (hypertension)    Mixed hyperlipidemia    Primary osteoarthritis involving multiple joints    Macular degeneration - Both Eyes    Fuch's endothelial dystrophy - Both Eyes    Lens replaced by other means    COAG (chronic open-angle glaucoma) - Both Eyes    Blepharitis, unspecified - Both Eyes    Abnormal ECG    PVC's (premature  ventricular contractions)    Other microscopic hematuria    LVH (left ventricular hypertrophy) due to hypertensive disease    Osteopenia of hip    Myalgia    Calcification of aorta    Neuropathy    Unequal blood pressures in paired extremities    Medication side effects    History of cornea transplant    Pseudophakia    Insomnia    Anxiety    Pre-diabetes    Obesity (BMI 30.0-34.9)    Bilateral shoulder region arthritis    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    Anemia    Thrombus of left atrial appendage    PAF (paroxysmal atrial fibrillation)    Elevated liver enzymes    Chronic anticoagulation    Keratitis sicca, bilateral    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    PAD (peripheral artery disease)    Statin intolerance    Pulmonary nodules/lesions, multiple    Spinal stenosis at L4-L5 level    Pulmonary granuloma    Lumbar spondylosis    Bilateral recurrent inguinal hernia without obstruction or gangrene    LLQ abdominal pain    Pain in left leg    Cellulitis, face    Rectal abnormality    Stenosis of carotid artery    Carotid artery calcification, bilateral    B12 deficiency    Seasonal allergic rhinitis     Review of patient's allergies indicates:   Allergen Reactions    Carvedilol Swelling     lips  lips  Other reaction(s): swelling    Cephalexin Other (See Comments)     Muscle/joint weakness unable to move     Doxycycline calcium Other (See Comments)     Weakness nausea   sob  Other reaction(s): weakness, nausea, SOB    Erythromycin Other (See Comments)     Complete weakness/could not walk    Gadolinium-containing contrast media Other (See Comments)     angioedema  Other reaction(s): Angioedema    Hydrocodone-acetaminophen      wheezing  wheezing  wheezing  Other reaction(s): wheezing    Inveltys [loteprednol etabonate] Palpitations and Other (See Comments)     Patient stated bp went up.    Labetalol Shortness  Of Breath, Nausea Only and Other (See Comments)     Palpitations, LE weakness  Other reaction(s): SOB, palpitations, weakness    Loratadine Other (See Comments)     Double vision/spots  Other reaction(s): double vision    Macrolide antibiotics Other (See Comments)     Complete weakness/could not walk  Complete weakness/could not walk  Complete weakness/could not walk  Complete weakness/could not walk  Other reaction(s): complete weakness    Moxifloxacin Other (See Comments)     Hives   muscle soreness  Other reaction(s): hives, muscle soreness    Naproxen      wheezing  wheezing  wheezing  Other reaction(s): wheezing    Statins-hmg-coa reductase inhibitors Other (See Comments)     Severe Muscle weakness  Muscle weakness  Severe Muscle weakness  Other reaction(s): severe muscle weakness    Timolol Other (See Comments)     Severe muscle weakness, eye problems.  Other reaction(s): severe muscle weakness    Verapamil Diarrhea     Severe diarrhea.  Other reaction(s): diarrhea    Amlodipine      Myalgias    Other reaction(s): myalgian    Fenofibrate      Causes muscle weakness  Other reaction(s): muscle weakness    Hydralazine      Other reaction(s): muscle tremors    Hydralazine analogues      Muscle tremors/aches      Isosorbide      Tolerates Imdur    Loteprednol      Other reaction(s): increased BP    Adhesive Rash     Clonidine patch - 2 mfg - contact dermatitis    Codeine Diarrhea and Other (See Comments)     Loss of balance   Other reaction(s): loss of balance    Doxazosin Palpitations     Other reaction(s): palpitations    Fexofenadine Other (See Comments)     Double vision/spots   Other reaction(s): double vision    Hydrocortisone (bulk) Other (See Comments)     Only suppository/ caused muscle weakness    Latanoprost Other (See Comments)     Muscle weakness  Other reaction(s): muscle weakness    Olopatadine Other (See Comments)     Muscle weakness  Other reaction(s): muscle weakness     Prednisone Anxiety       Medications:  Current Outpatient Medications on File Prior to Visit   Medication Sig Dispense Refill    acetaminophen (TYLENOL) 500 MG tablet Take 500 mg by mouth daily as needed. Taking 1 to 3      ALPRAZolam (XANAX) 0.5 MG tablet Take 1 tablet (0.5 mg total) by mouth nightly as needed for Anxiety. 30 tablet 5    apixaban (ELIQUIS) 2.5 mg Tab Take 1 tablet (2.5 mg total) by mouth 2 (two) times daily. 180 tablet 3    b complex vitamins capsule Take 1 capsule by mouth once daily.      cholecalciferol, vitamin D3, (VITAMIN D3) 50 mcg (2,000 unit) Cap Take 1 capsule by mouth once daily.      cloNIDine (CATAPRES) 0.1 MG tablet Take 1 tablet (0.1 mg total) by mouth 2 (two) times daily as needed (systolic BP over 180). 20 tablet 1    coenzyme Q10 200 mg capsule Take 400 mg by mouth once daily.       fish oil-omega-3 fatty acids 300-1,000 mg capsule Take 2 g by mouth 2 (two) times daily.       furosemide (LASIX) 40 MG tablet Take 1 tablet (40 mg total) by mouth daily as needed (edema, swelling, fluid). 60 tablet 3    isosorbide mononitrate (IMDUR) 30 MG 24 hr tablet Take 1 tablet (30 mg total) by mouth every evening. 30 tablet 3    losartan (COZAAR) 50 MG tablet Take 1 tablet (50 mg total) by mouth 2 (two) times daily. 90 tablet 1    loteprednol (LOTEMAX) 0.5 % ophthalmic suspension Place 1 drop into the right eye once daily. Right eye only 5 mL 11    loteprednol etabonate (LOTEMAX SM) 0.38 % DrpG Place 1 drop into both eyes once daily. 10 g 3    metoprolol tartrate (LOPRESSOR) 25 MG tablet TAKE 1/2 TABLET BY MOUTH TWICE DAILY 90 tablet 0    mupirocin (BACTROBAN) 2 % ointment apply to affected area twice a day with Q-tip. Antibiotic ointment. 30 g 1    netarsudiL (RHOPRESSA) 0.02 % ophthalmic solution Place 1 drop into the left eye every evening. 2.5 mL 12    POLYETHYLENE GLYCOL 3350 (MIRALAX ORAL) Take 17 g by mouth 2 (two) times a day. Taking BID      travoprost (TRAVATAN Z)  "0.004 % ophthalmic solution PLACE 1 DROP INTO THE LEFT EYE EVERY EVENING. 2.5 mL 10     Current Facility-Administered Medications on File Prior to Visit   Medication Dose Route Frequency Provider Last Rate Last Admin    sodium chloride 0.9% flush 3 mL  3 mL Intravenous PRN Steve Galarza PA-C           Medications have been reviewed and reconciled with patient at visit today.    Barriers to medications present (no)    Adverse reactions to current medications (no)  Feels cephalexin caused weakness and myalgias    Exam:  Vitals:    05/23/22 1119   BP: 102/68   Pulse: 75   Temp: 97.4 °F (36.3 °C)     Weight: 85.5 kg (188 lb 7.9 oz)   Body mass index is 32.35 kg/m².    BP Readings from Last 3 Encounters:   05/23/22 102/68   04/25/22 (!) 144/72   04/07/22 (!) 179/81     Wt Readings from Last 3 Encounters:   05/23/22 1119 85.5 kg (188 lb 7.9 oz)   04/25/22 1136 86 kg (189 lb 9.5 oz)   04/07/22 1248 86.2 kg (190 lb 0.6 oz)      Physical Exam  Constitutional:       General: She is not in acute distress.     Appearance: Normal appearance.      Comments: Appear younger than stated age   HENT:      Head: Normocephalic and atraumatic.      Right Ear: Tympanic membrane, ear canal and external ear normal. There is no impacted cerumen.      Left Ear: Ear canal and external ear normal. There is no impacted cerumen.      Nose: Nose normal. No congestion.      Comments: Mild R sinus tenderness on palpation  Frequent "sniffling"     Mouth/Throat:      Mouth: Mucous membranes are moist.      Pharynx: Oropharynx is clear.   Eyes:      General: No scleral icterus.        Right eye: No discharge.         Left eye: No discharge.      Extraocular Movements: Extraocular movements intact.      Comments: glasses   Neck:      Vascular: No carotid bruit.   Cardiovascular:      Rate and Rhythm: Normal rate and regular rhythm.      Heart sounds: Normal heart sounds.   Pulmonary:      Effort: Pulmonary effort is normal. No respiratory " distress.      Breath sounds: Normal breath sounds. No wheezing.   Abdominal:      General: Bowel sounds are normal.      Palpations: Abdomen is soft.      Tenderness: There is no abdominal tenderness. There is no guarding.   Musculoskeletal:         General: Swelling (trace BLE edema) present.      Cervical back: Neck supple.      Comments: Ambulates w rollator   Lymphadenopathy:      Cervical: No cervical adenopathy.   Skin:     General: Skin is warm and dry.   Neurological:      Mental Status: She is alert and oriented to person, place, and time. Mental status is at baseline.   Psychiatric:         Mood and Affect: Mood normal.         Behavior: Behavior normal.     Laboratory Reviewed: (Yes)  Lab Results   Component Value Date    WBC 7.55 01/24/2022    HGB 13.0 01/24/2022    HCT 41.1 01/24/2022     01/24/2022    CHOL 232 (H) 07/19/2021    TRIG 141 07/19/2021    HDL 48 07/19/2021    ALT 16 01/24/2022    AST 21 01/24/2022     04/26/2022    K 3.9 04/26/2022     04/26/2022    CREATININE 1.4 04/26/2022    BUN 27 (H) 04/26/2022    CO2 27 04/26/2022    TSH 1.412 01/24/2022    INR 1.0 02/12/2020    HGBA1C 5.9 (H) 03/08/2021     Health Maintenance  Health Maintenance Topics with due status: Not Due       Topic Last Completion Date    DEXA Scan 10/19/2020    Lipid Panel 07/19/2021    Pneumococcal Vaccines (Age 65+) 03/16/2022     Health Maintenance Due   Topic Date Due    Shingles Vaccine (1 of 2) Never done    TETANUS VACCINE  11/09/2021    COVID-19 Vaccine (4 - Booster for Pfizer series) 12/29/2021       Assessment:  Problem List Items Addressed This Visit        Ophtho    COAG (chronic open-angle glaucoma) - Both Eyes     Cont eye drops and close f/u w ophthalmology on elevated pressure OS (note - BP lability may be related to eye drops? - may want to discuss w her ophthalmologist)              ENT    Seasonal allergic rhinitis     Discussed can try intranasal saline water - has helped in the  past              Pulmonary    Pulmonary nodules/lesions, multiple     S/p PET scan and biopsy - thus far reassuring - 6 mos f/u w Pulm as scheduled              Cardiac/Vascular    HTN (hypertension) - Primary (Chronic)     Note concern of labile BP back to 2014 (could eye drops be contributing?). For now: advised to cont low dose HCTZ - furosemide prn only (wt gain, increased edema, SOB/DUE) Advised ok to take losartan once daily only - advised to hold losartan if SBP < 120. F/u by phone next week to see how BP's have been running.            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 Bettencourt seems often to attribute to medication however suspect not necessarily medication related - encourage increase movement, daily physical activity - f/u Rheum                  -Patient's recent lab results were reviewed and discussed with patient  -Treatment options and alternatives were discussed with the patient. Patient expressed understanding. Patient was given the opportunity to ask questions and be an active participant in their medical care. Patient had no further questions or concerns at this time.   -Patient is an overall moderate risk for ED or Hospitalization from their medical conditions but HIGH risk for CoVid complications, diabetes or stroke.     Follow up: Follow up in about 2  months (around 7/23/2022) for Follow Up.    After visit summary printed and given to patient upon discharge.    The following issues were discussed: The primary encounter diagnosis was Primary hypertension. 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    TOTAL TIME evaluating and managing this patient for this encounter was greater than 60 minutes. This time was spent personally by me on the following activities: review of patient's past medical history, assessing age-appropriate health maintenance needs, review of any interval history, review and interpretation of lab results, review and interpretation of imaging test results, review and interpretation of cardiology test results, reviewing consulting specialist notes, obtaining history from the patient, examination of the patient, medication reconciliation, managing and/or ordering prescription medications, educating patient and answering their questions about diagnosis, treatment plan, and goals of treatment, discussing planned follow-up and final documentation of the visit. This time was exclusive of any separately billable procedures for this patient and exclusive of time spent treating any other patients.

## 2022-05-24 PROBLEM — J30.2 SEASONAL ALLERGIC RHINITIS: Status: ACTIVE | Noted: 2022-05-23

## 2022-05-24 NOTE — ASSESSMENT & PLAN NOTE
Note concern of labile BP back to 2014 (could eye drops be contributing?). For now: advised to cont low dose HCTZ - furosemide prn only (wt gain, increased edema, SOB/DUE) Advised ok to take losartan once daily only - advised to hold losartan if SBP < 120. F/u by phone next week to see how BP's have been running.

## 2022-05-24 NOTE — ASSESSMENT & PLAN NOTE
Chronic - Ms. Bettencourt seems often to attribute to medication however suspect not necessarily medication related - encourage increase movement, daily physical activity - f/u Rheum

## 2022-05-24 NOTE — ASSESSMENT & PLAN NOTE
Cont eye drops and close f/u w ophthalmology on elevated pressure OS (note - BP lability may be related to eye drops? - may want to discuss w her ophthalmologist)

## 2022-05-24 NOTE — ASSESSMENT & PLAN NOTE
Chronic - 3/2021 renal CT negative for stone - consider referral to urology for cystoscope (r/o bladder cancer?)

## 2022-05-24 NOTE — ASSESSMENT & PLAN NOTE
Stable - cont encourage healthier food/beverage choices when possible - can try also keeping evening meal low carb and earlier and smaller

## 2022-05-31 ENCOUNTER — OFFICE VISIT (OUTPATIENT)
Dept: OPHTHALMOLOGY | Facility: CLINIC | Age: 87
End: 2022-05-31
Payer: COMMERCIAL

## 2022-05-31 DIAGNOSIS — Z94.7 HISTORY OF CORNEA TRANSPLANT: ICD-10-CM

## 2022-05-31 DIAGNOSIS — M35.01 KERATITIS SICCA, BILATERAL: ICD-10-CM

## 2022-05-31 DIAGNOSIS — Z96.1 PSEUDOPHAKIA: ICD-10-CM

## 2022-05-31 DIAGNOSIS — H40.1131 PRIMARY OPEN ANGLE GLAUCOMA (POAG) OF BOTH EYES, MILD STAGE: Primary | ICD-10-CM

## 2022-05-31 PROCEDURE — 1159F PR MEDICATION LIST DOCUMENTED IN MEDICAL RECORD: ICD-10-PCS | Mod: HCNC,CPTII,S$GLB, | Performed by: OPHTHALMOLOGY

## 2022-05-31 PROCEDURE — 99999 PR PBB SHADOW E&M-EST. PATIENT-LVL III: CPT | Mod: PBBFAC,HCNC,, | Performed by: OPHTHALMOLOGY

## 2022-05-31 PROCEDURE — 99999 PR PBB SHADOW E&M-EST. PATIENT-LVL III: ICD-10-PCS | Mod: PBBFAC,HCNC,, | Performed by: OPHTHALMOLOGY

## 2022-05-31 PROCEDURE — 99214 OFFICE O/P EST MOD 30 MIN: CPT | Mod: HCNC,S$GLB,, | Performed by: OPHTHALMOLOGY

## 2022-05-31 PROCEDURE — 1160F PR REVIEW ALL MEDS BY PRESCRIBER/CLIN PHARMACIST DOCUMENTED: ICD-10-PCS | Mod: HCNC,CPTII,S$GLB, | Performed by: OPHTHALMOLOGY

## 2022-05-31 PROCEDURE — 1159F MED LIST DOCD IN RCRD: CPT | Mod: HCNC,CPTII,S$GLB, | Performed by: OPHTHALMOLOGY

## 2022-05-31 PROCEDURE — 1160F RVW MEDS BY RX/DR IN RCRD: CPT | Mod: HCNC,CPTII,S$GLB, | Performed by: OPHTHALMOLOGY

## 2022-05-31 PROCEDURE — 99214 PR OFFICE/OUTPT VISIT, EST, LEVL IV, 30-39 MIN: ICD-10-PCS | Mod: HCNC,S$GLB,, | Performed by: OPHTHALMOLOGY

## 2022-05-31 NOTE — PROGRESS NOTES
SUBJECTIVE  AdrianaIfrah Metz is 89 y.o. female  Corrected distance visual acuity was 20/30 -2 in the right eye and 20/30 -2 in the left eye.   Chief Complaint   Patient presents with    Glaucoma     Pt here for 3 wk IOP chk. Pt says she saw Dr. Quintanilla last week and was told she can stop the Lotemax. No pain or discomfort. VA stable. 100% compliant with gtts.           HPI     Glaucoma      Additional comments: Pt here for 3 wk IOP chk. Pt says she saw Dr. Quintanilla last week and was told she can stop the Lotemax. No pain or   discomfort. VA stable. 100% compliant with gtts.               Comments     1. Mild COAG Goal < 19   SLT OD 3/04 (20 to 15) + 3/11 (19 to 15)   SLT OS 5/05 (20 to 14) +5/11 (18-19 to 15) + 3/12 (min resp.) + 3/11/15   (20.5-17.5) + 3/7/18 (24- 21) + 11/3/21 (22.5-17.5)  (Cosopt, Xalatan, and Timolol cause Myalgias)   (Alphagan causes redness) (zioptan too expensive)   Possible steroid responder   2. PCIOL OU   3. Mild Dry AMD   4. Fuch's Dystrophy   DSAEK OD 4/16 Dr. Quintanilla (followed by [facility] every summer)   DSAEK OS 8/15 Dr. Quintanilla   Rx Inveltys (1% lotemax) (IOP 15/23) 5/29/20   5. Dry Eye -intolerance to Restasis   6. Recent A-Fib. (from Labetolol ?)         Lotemax OD: Mon, Wed,Fri and Sun once daily   Lotemax BID OS  Systane Ultra 4-5 tiimes daily   Travatan Z qhs os   Rhopressa qhs OS             Last edited by Carmelo Banegas MA on 5/31/2022  1:20 PM. (History)         Assessment /Plan :  1. Primary open angle glaucoma (POAG) of both eyes, mild stage   Doing well, intraocular pressure (IOP) OD within acceptable range relative to target IOP and no evidence of progression. Continue current treatment. Reviewed importance of continued compliance with treatment and follow up.      Patient instructed to continue using the following glaucoma medication as follows:  Rhopressa one drop in the left eye nightly and Travatan Z one drop in the left eye every night    Intraocular pressure  (IOP) OS not within acceptable range relative to target IOP with risk of irreversible visual loss. Better IOP control is recommended. Treatment options may include change or additional medications, Selective Laser Trabeculoplasty (SLT laser), and/or incisional glaucoma surgery. Reviewed importance of continued compliance with treatment and follow up.     Possible steroid response  Patient chooses discontinue Lotemax OU    Return to clinic in 1 month  or as needed.  With IOP Check           2. Keratitis sicca, bilateral  -- Condition stable, no therapeutic change required. Monitoring routinely.     3. Pseudophakia  -- Condition stable, no therapeutic change required. Monitoring routinely.     4. History of cornea transplant - followed by Dr. Quintanilla

## 2022-06-08 ENCOUNTER — OFFICE VISIT (OUTPATIENT)
Dept: RHEUMATOLOGY | Facility: CLINIC | Age: 87
End: 2022-06-08
Payer: COMMERCIAL

## 2022-06-08 VITALS
SYSTOLIC BLOOD PRESSURE: 136 MMHG | WEIGHT: 188.06 LBS | HEIGHT: 64 IN | BODY MASS INDEX: 32.11 KG/M2 | DIASTOLIC BLOOD PRESSURE: 76 MMHG | HEART RATE: 75 BPM

## 2022-06-08 DIAGNOSIS — R76.8 ANA POSITIVE: Primary | ICD-10-CM

## 2022-06-08 DIAGNOSIS — M15.9 PRIMARY OSTEOARTHRITIS INVOLVING MULTIPLE JOINTS: ICD-10-CM

## 2022-06-08 DIAGNOSIS — Z71.89 COUNSELING ON HEALTH PROMOTION AND DISEASE PREVENTION: ICD-10-CM

## 2022-06-08 PROCEDURE — 99999 PR PBB SHADOW E&M-EST. PATIENT-LVL V: ICD-10-PCS | Mod: PBBFAC,HCNC,, | Performed by: INTERNAL MEDICINE

## 2022-06-08 PROCEDURE — 99999 PR PBB SHADOW E&M-EST. PATIENT-LVL V: CPT | Mod: PBBFAC,HCNC,, | Performed by: INTERNAL MEDICINE

## 2022-06-08 PROCEDURE — 99214 PR OFFICE/OUTPT VISIT, EST, LEVL IV, 30-39 MIN: ICD-10-PCS | Mod: HCNC,S$GLB,, | Performed by: INTERNAL MEDICINE

## 2022-06-08 PROCEDURE — 1125F PR PAIN SEVERITY QUANTIFIED, PAIN PRESENT: ICD-10-PCS | Mod: HCNC,CPTII,S$GLB, | Performed by: INTERNAL MEDICINE

## 2022-06-08 PROCEDURE — 1101F PT FALLS ASSESS-DOCD LE1/YR: CPT | Mod: HCNC,CPTII,S$GLB, | Performed by: INTERNAL MEDICINE

## 2022-06-08 PROCEDURE — 99214 OFFICE O/P EST MOD 30 MIN: CPT | Mod: HCNC,S$GLB,, | Performed by: INTERNAL MEDICINE

## 2022-06-08 PROCEDURE — 1159F MED LIST DOCD IN RCRD: CPT | Mod: HCNC,CPTII,S$GLB, | Performed by: INTERNAL MEDICINE

## 2022-06-08 PROCEDURE — 1125F AMNT PAIN NOTED PAIN PRSNT: CPT | Mod: HCNC,CPTII,S$GLB, | Performed by: INTERNAL MEDICINE

## 2022-06-08 PROCEDURE — 3288F PR FALLS RISK ASSESSMENT DOCUMENTED: ICD-10-PCS | Mod: HCNC,CPTII,S$GLB, | Performed by: INTERNAL MEDICINE

## 2022-06-08 PROCEDURE — 3288F FALL RISK ASSESSMENT DOCD: CPT | Mod: HCNC,CPTII,S$GLB, | Performed by: INTERNAL MEDICINE

## 2022-06-08 PROCEDURE — 1101F PR PT FALLS ASSESS DOC 0-1 FALLS W/OUT INJ PAST YR: ICD-10-PCS | Mod: HCNC,CPTII,S$GLB, | Performed by: INTERNAL MEDICINE

## 2022-06-08 PROCEDURE — 1159F PR MEDICATION LIST DOCUMENTED IN MEDICAL RECORD: ICD-10-PCS | Mod: HCNC,CPTII,S$GLB, | Performed by: INTERNAL MEDICINE

## 2022-06-08 NOTE — PROGRESS NOTES
RHEUMATOLOGY OUTPATIENT CLINIC NOTE    6/8/2022    Attending Rheumatologist: Taco Jiménez  Primary Care Provider/Physician Requesting Consultation: Heather Miller NP   Chief Complaint/Reason For Consultation:  Positive VIRY, Joint Pain, and Osteoarthritis      Subjective:     Shirley Metz is a 89 y.o. White female with positive VIRY and chronic pain syndrome for follow up visit.    Main concern of widespread arthralgias with mechanical pattern, most prominent on lower body.    Review of Systems   Constitutional: Negative for fever.   Eyes: Negative for photophobia and pain.   Respiratory: Negative for cough and shortness of breath.    Gastrointestinal: Negative for heartburn.        Denies dysphagia   Musculoskeletal: Positive for back pain, joint pain and myalgias.   Skin: Negative for rash.        Denies Raynaud phenomena   Neurological: Negative for focal weakness.       Chronic comorbid conditions affecting medical decision making today:  Past Medical History:   Diagnosis Date    Anemia 11/29/2018    Anxiety 11/28/2017    Arthritis     Atrial fibrillation with RVR 10/9/2019    Back pain     Basal cell carcinoma 03/29/2018    left mid back    Chronic diastolic congestive heart failure 10/15/2019    CKD (chronic kidney disease) stage 3, GFR 30-59 ml/min 8/8/2016    Colon polyps     reported per patient.     Coronary artery calcification 10/5/2021    Fuchs' corneal dystrophy     General anesthetics causing adverse effect in therapeutic use     woke up during surgery and gagged on ET tube    Glaucoma     Glaucoma     Heart failure with preserved left ventricular function (HFpEF) 7/24/2018    Hyperlipidemia     Hypertension     Macular degeneration dry    Obesity     PVD (peripheral vascular disease) 4/26/2021    PVD (peripheral vascular disease) 4/26/2021    Squamous cell carcinoma 01/08/2019    left shoulder    Stenosis of carotid artery 10/5/2021    Trouble in sleeping      Type 2 diabetes mellitus without complication, without long-term current use of insulin 2/24/2021    Urinary tract infection      Past Surgical History:   Procedure Laterality Date    ADENOIDECTOMY  1938    BREAST BIOPSY Left     1997. benign    BREAST CYST EXCISION Left 1997 approx    CARPAL TUNNEL RELEASE Right 2012 approx    CATARACT EXTRACTION Bilateral 1994    CHOLECYSTECTOMY  1975    CORNEAL TRANSPLANT Bilateral 08/2015 8/2015 - left; Right 4/2016    EYE SURGERY      Fuch's Corneal dystrophy - had 2nd operation with Dr. Quintanilla    EYE SURGERY      Cataract wIOL    left thumb Left 2011    removed cuboid for arthritis    REVERSE TOTAL SHOULDER ARTHROPLASTY Left 8/21/2018    Procedure: ARTHROPLASTY, SHOULDER, TOTAL, REVERSE;  Surgeon: Solomon Lujan MD;  Location: [facility] OR;  Service: Orthopedics;  Laterality: Left;  WITH BICEP TENODESIS    SKIN CANCER EXCISION Left 2017    BCC removal by Dr. Valadez    SLT- OS (aka TRABECULOPLASTY) Left 05/11/2011    repeat 3/14/12    TENOTOMY Left 8/21/2018    Procedure: TENOTOMY;  Surgeon: Solomon Lujan MD;  Location: [facility] OR;  Service: Orthopedics;  Laterality: Left;    TONSILLECTOMY  1938    TREATMENT OF CARDIAC ARRHYTHMIA N/A 10/10/2019    Procedure: CARDIOVERSION;  Surgeon: Pete Durán MD;  Location: [facility] CATH LAB;  Service: Cardiology;  Laterality: N/A;    TREATMENT OF CARDIAC ARRHYTHMIA N/A 12/6/2019    Procedure: CARDIOVERSION;  Surgeon: Samy Castillo MD;  Location: [facility] CATH LAB;  Service: Cardiology;  Laterality: N/A;     Family History   Problem Relation Age of Onset    Heart disease Mother     Hypertension Mother     Cancer Father 88        sarcoma( ?Kaposi's ) on leg    Deep vein thrombosis Neg Hx     Ovarian cancer Neg Hx     Breast cancer Neg Hx     Kidney disease Neg Hx     Strabismus Neg Hx     Retinal detachment Neg Hx     Macular degeneration Neg Hx     Glaucoma Neg Hx     Blindness Neg Hx     Amblyopia Neg Hx      Eczema Neg Hx     Lupus Neg Hx     Melanoma Neg Hx     Psoriasis Neg Hx     Diabetes Neg Hx     Stroke Neg Hx     Mental retardation Neg Hx     Mental illness Neg Hx     Hyperlipidemia Neg Hx     COPD Neg Hx     Asthma Neg Hx     Depression Neg Hx     Alcohol abuse Neg Hx     Drug abuse Neg Hx      Social History     Substance and Sexual Activity   Alcohol Use Yes    Alcohol/week: 2.0 standard drinks    Types: 2 Standard drinks or equivalent per week    Comment: occ     Social History     Tobacco Use   Smoking Status Never Smoker   Smokeless Tobacco Never Used   Tobacco Comment    history of passive smoking from her ex-     Social History     Substance and Sexual Activity   Drug Use No       Current Outpatient Medications:     acetaminophen (TYLENOL) 500 MG tablet, Take 500 mg by mouth daily as needed. Taking 1 to 3, Disp: , Rfl:     ALPRAZolam (XANAX) 0.5 MG tablet, Take 1 tablet (0.5 mg total) by mouth nightly as needed for Anxiety., Disp: 30 tablet, Rfl: 5    apixaban (ELIQUIS) 2.5 mg Tab, Take 1 tablet (2.5 mg total) by mouth 2 (two) times daily., Disp: 180 tablet, Rfl: 3    b complex vitamins capsule, Take 1 capsule by mouth once daily., Disp: , Rfl:     cholecalciferol, vitamin D3, (VITAMIN D3) 50 mcg (2,000 unit) Cap, Take 1 capsule by mouth once daily., Disp: , Rfl:     cloNIDine (CATAPRES) 0.1 MG tablet, Take 1 tablet (0.1 mg total) by mouth 2 (two) times daily as needed (systolic BP over 180)., Disp: 20 tablet, Rfl: 1    coenzyme Q10 200 mg capsule, Take 400 mg by mouth once daily. , Disp: , Rfl:     fish oil-omega-3 fatty acids 300-1,000 mg capsule, Take 2 g by mouth 2 (two) times daily. , Disp: , Rfl:     furosemide (LASIX) 40 MG tablet, Take 1 tablet (40 mg total) by mouth daily as needed (edema, swelling, fluid)., Disp: 60 tablet, Rfl: 3    hydroCHLOROthiazide (HYDRODIURIL) 12.5 MG Tab, Take 1 tablet (12.5 mg total) by mouth once daily., Disp: 30 tablet, Rfl: 11     isosorbide mononitrate (IMDUR) 30 MG 24 hr tablet, Take 1 tablet (30 mg total) by mouth every evening., Disp: 30 tablet, Rfl: 3    losartan (COZAAR) 50 MG tablet, Take 1 tablet (50 mg total) by mouth 2 (two) times daily., Disp: 90 tablet, Rfl: 1    loteprednol (LOTEMAX) 0.5 % ophthalmic suspension, Place 1 drop into the right eye once daily. Right eye only, Disp: 5 mL, Rfl: 11    metoprolol tartrate (LOPRESSOR) 25 MG tablet, TAKE 1/2 TABLET BY MOUTH TWICE DAILY, Disp: 90 tablet, Rfl: 0    mupirocin (BACTROBAN) 2 % ointment, apply to affected area twice a day with Q-tip. Antibiotic ointment., Disp: 30 g, Rfl: 1    netarsudiL (RHOPRESSA) 0.02 % ophthalmic solution, Place 1 drop into the left eye every evening., Disp: 2.5 mL, Rfl: 12    POLYETHYLENE GLYCOL 3350 (MIRALAX ORAL), Take 17 g by mouth 2 (two) times a day. Taking BID, Disp: , Rfl:     travoprost (TRAVATAN Z) 0.004 % ophthalmic solution, PLACE 1 DROP INTO THE LEFT EYE EVERY EVENING., Disp: 2.5 mL, Rfl: 10    loteprednol etabonate (LOTEMAX SM) 0.38 % DrpG, Place 1 drop into both eyes once daily., Disp: 10 g, Rfl: 3  No current facility-administered medications for this visit.    Facility-Administered Medications Ordered in Other Visits:     sodium chloride 0.9% flush 3 mL, 3 mL, Intravenous, PRN, Steve Galarza PA-C     Objective:     Vitals:    06/08/22 0812   BP: 136/76   Pulse: 75     Physical Exam   Constitutional: She appears obese.   Eyes: Conjunctivae are normal.   Pulmonary/Chest: Effort normal.   Musculoskeletal:         General: Deformity (Dubuytren;s 4th finger b/l) present. No swelling or tenderness. Normal range of motion.   Neurological: She displays no weakness. Gait normal.   Skin: No rash noted.       Reviewed available old and all outside pertinent medical records available.    All lab results personally reviewed and interpreted by me.  Lab Results   Component Value Date    WBC 7.55 01/24/2022    HGB 13.0 01/24/2022    HCT 41.1  01/24/2022     (H) 01/24/2022    RDW 12.7 01/24/2022     01/24/2022       Lab Results   Component Value Date     04/26/2022    K 3.9 04/26/2022     04/26/2022    CO2 27 04/26/2022     (H) 04/26/2022    BUN 27 (H) 04/26/2022    CALCIUM 9.7 04/26/2022    PROT 7.7 01/24/2022    ALBUMIN 3.7 04/26/2022    BILITOT 0.3 01/24/2022    AST 21 01/24/2022    ALKPHOS 65 01/24/2022    ALT 16 01/24/2022       Lab Results   Component Value Date    COLORU Yellow 04/25/2022    APPEARANCEUA Clear 04/25/2022    SPECGRAV 1.020 04/25/2022    PHUR 6.0 04/25/2022    PROTEINUA Negative 04/25/2022    KETONESU Negative 04/25/2022    LEUKOCYTESUR Negative 04/25/2022    NITRITE Negative 04/25/2022    UROBILINOGEN Negative 04/25/2022       Lab Results   Component Value Date    PTH 76.8 09/18/2020       Lab Results   Component Value Date    URICACID 5.9 (H) 04/17/2018       Lab Results   Component Value Date    CRP 4.0 04/26/2022       Lab Results   Component Value Date    ANATITER 1:160 01/24/2022       No components found for: TSPOTTB,  QUANTIFERON     ASSESSMENT:     Positive VIRY with no objective manifestation of systemic rheumatic disease.  HAYLEE panel negative, rest rheumatic workup unrevealing.  Patient reported muscle weakness with no reproducible muscle weakness on exam.  Describes symptoms suggestive of restless leg syndrome.  No history of DOMITILA or lower GI bleed.  Trial of either gabapentin or Requip for intermittent muscle cramps and lower body paresthesias.  Side effects of therapy discussed.  Patient will inform clinic if amenable to proceed with therapeutic trial.  Referral to physical therapy for range of motion, flexibility, and strengthening exercises.  Currently without rheumatic indication for immunosuppression.    PLAN:     1. VIRY positive    - Ambulatory referral/consult to Rheumatology    2. Osteoarthritis    3. Other specified counseling      Disclaimer: This note is prepared using voice  recognition software and as such is likely to have errors and has not been proof read. Please contact me for questions.       Taco Jiménez M.D.

## 2022-06-09 PROCEDURE — G0180 MD CERTIFICATION HHA PATIENT: HCPCS | Mod: ,,, | Performed by: INTERNAL MEDICINE

## 2022-06-09 PROCEDURE — G0180 PR HOME HEALTH MD CERTIFICATION: ICD-10-PCS | Mod: ,,, | Performed by: INTERNAL MEDICINE

## 2022-06-14 DIAGNOSIS — I10 PRIMARY HYPERTENSION: Primary | ICD-10-CM

## 2022-06-15 ENCOUNTER — OFFICE VISIT (OUTPATIENT)
Dept: CARDIOLOGY | Facility: CLINIC | Age: 87
End: 2022-06-15
Payer: COMMERCIAL

## 2022-06-15 ENCOUNTER — LAB VISIT (OUTPATIENT)
Dept: LAB | Facility: HOSPITAL | Age: 87
End: 2022-06-15
Attending: INTERNAL MEDICINE
Payer: MEDICARE

## 2022-06-15 ENCOUNTER — HOSPITAL ENCOUNTER (OUTPATIENT)
Dept: CARDIOLOGY | Facility: HOSPITAL | Age: 87
Discharge: HOME | End: 2022-06-15
Attending: INTERNAL MEDICINE
Payer: COMMERCIAL

## 2022-06-15 VITALS
OXYGEN SATURATION: 98 % | SYSTOLIC BLOOD PRESSURE: 140 MMHG | HEIGHT: 64 IN | DIASTOLIC BLOOD PRESSURE: 80 MMHG | BODY MASS INDEX: 31.92 KG/M2 | HEART RATE: 86 BPM | WEIGHT: 187 LBS

## 2022-06-15 DIAGNOSIS — I11.9 HYPERTENSIVE LEFT VENTRICULAR HYPERTROPHY, WITHOUT HEART FAILURE: ICD-10-CM

## 2022-06-15 DIAGNOSIS — I10 PRIMARY HYPERTENSION: ICD-10-CM

## 2022-06-15 DIAGNOSIS — I65.29 STENOSIS OF CAROTID ARTERY, UNSPECIFIED LATERALITY: ICD-10-CM

## 2022-06-15 DIAGNOSIS — I48.0 PAF (PAROXYSMAL ATRIAL FIBRILLATION): ICD-10-CM

## 2022-06-15 DIAGNOSIS — I70.0 CALCIFICATION OF AORTA: ICD-10-CM

## 2022-06-15 DIAGNOSIS — I49.3 PVC'S (PREMATURE VENTRICULAR CONTRACTIONS): ICD-10-CM

## 2022-06-15 DIAGNOSIS — N19 HYPERTENSIVE HEART AND RENAL DISEASE WITH BOTH (CONGESTIVE) HEART FAILURE AND RENAL FAILURE: ICD-10-CM

## 2022-06-15 DIAGNOSIS — E78.2 MIXED HYPERLIPIDEMIA: ICD-10-CM

## 2022-06-15 DIAGNOSIS — I13.2 HYPERTENSIVE HEART AND RENAL DISEASE WITH BOTH (CONGESTIVE) HEART FAILURE AND RENAL FAILURE: ICD-10-CM

## 2022-06-15 DIAGNOSIS — I73.9 PAD (PERIPHERAL ARTERY DISEASE): ICD-10-CM

## 2022-06-15 DIAGNOSIS — I65.23 CAROTID ARTERY CALCIFICATION, BILATERAL: ICD-10-CM

## 2022-06-15 DIAGNOSIS — I10 PRIMARY HYPERTENSION: Primary | ICD-10-CM

## 2022-06-15 LAB — BNP SERPL-MCNC: 107 PG/ML (ref 0–99)

## 2022-06-15 PROCEDURE — 93010 EKG 12-LEAD: ICD-10-PCS | Mod: HCNC,,, | Performed by: INTERNAL MEDICINE

## 2022-06-15 PROCEDURE — 93005 ELECTROCARDIOGRAM TRACING: CPT | Mod: HCNC

## 2022-06-15 PROCEDURE — 99999 PR PBB SHADOW E&M-EST. PATIENT-LVL V: CPT | Mod: PBBFAC,HCNC,, | Performed by: INTERNAL MEDICINE

## 2022-06-15 PROCEDURE — 1160F PR REVIEW ALL MEDS BY PRESCRIBER/CLIN PHARMACIST DOCUMENTED: ICD-10-PCS | Mod: HCNC,CPTII,S$GLB, | Performed by: INTERNAL MEDICINE

## 2022-06-15 PROCEDURE — 99999 PR PBB SHADOW E&M-EST. PATIENT-LVL V: ICD-10-PCS | Mod: PBBFAC,HCNC,, | Performed by: INTERNAL MEDICINE

## 2022-06-15 PROCEDURE — 83880 ASSAY OF NATRIURETIC PEPTIDE: CPT | Mod: HCNC | Performed by: INTERNAL MEDICINE

## 2022-06-15 PROCEDURE — 1159F PR MEDICATION LIST DOCUMENTED IN MEDICAL RECORD: ICD-10-PCS | Mod: HCNC,CPTII,S$GLB, | Performed by: INTERNAL MEDICINE

## 2022-06-15 PROCEDURE — 36415 COLL VENOUS BLD VENIPUNCTURE: CPT | Mod: HCNC | Performed by: INTERNAL MEDICINE

## 2022-06-15 PROCEDURE — 1159F MED LIST DOCD IN RCRD: CPT | Mod: HCNC,CPTII,S$GLB, | Performed by: INTERNAL MEDICINE

## 2022-06-15 PROCEDURE — 93010 ELECTROCARDIOGRAM REPORT: CPT | Mod: HCNC,,, | Performed by: INTERNAL MEDICINE

## 2022-06-15 PROCEDURE — 99214 OFFICE O/P EST MOD 30 MIN: CPT | Mod: HCNC,S$GLB,, | Performed by: INTERNAL MEDICINE

## 2022-06-15 PROCEDURE — 99214 PR OFFICE/OUTPT VISIT, EST, LEVL IV, 30-39 MIN: ICD-10-PCS | Mod: HCNC,S$GLB,, | Performed by: INTERNAL MEDICINE

## 2022-06-15 PROCEDURE — 1126F PR PAIN SEVERITY QUANTIFIED, NO PAIN PRESENT: ICD-10-PCS | Mod: HCNC,CPTII,S$GLB, | Performed by: INTERNAL MEDICINE

## 2022-06-15 PROCEDURE — 1126F AMNT PAIN NOTED NONE PRSNT: CPT | Mod: HCNC,CPTII,S$GLB, | Performed by: INTERNAL MEDICINE

## 2022-06-15 PROCEDURE — 1160F RVW MEDS BY RX/DR IN RCRD: CPT | Mod: HCNC,CPTII,S$GLB, | Performed by: INTERNAL MEDICINE

## 2022-06-15 RX ORDER — ISOSORBIDE MONONITRATE 30 MG/1
30 TABLET, EXTENDED RELEASE ORAL NIGHTLY
Qty: 90 TABLET | Refills: 1 | Status: SHIPPED | OUTPATIENT
Start: 2022-06-15 | End: 2022-07-14

## 2022-06-15 RX ORDER — LOSARTAN POTASSIUM 25 MG/1
25 TABLET ORAL 2 TIMES DAILY
Qty: 180 TABLET | Refills: 1 | Status: ON HOLD | OUTPATIENT
Start: 2022-06-15 | End: 2022-07-14 | Stop reason: SDUPTHER

## 2022-06-15 NOTE — PROGRESS NOTES
Subjective:   Patient ID:  Shirley Metz is a 89 y.o. female who presents for cardiac consult of Hypertension (Pt states her blood pressure drops when she stands up.)      Shortness of Breath  Pertinent negatives include no leg swelling or rash.   Fatigue  Associated symptoms include fatigue. Pertinent negatives include no rash.   Hypertension  Associated symptoms include malaise/fatigue and shortness of breath.     The patient came in today for cardiac consult of Hypertension (Pt states her blood pressure drops when she stands up.)    Shirley Metz is a 89 y.o. female pt with HTN, PAF on Eliquis, PVD, carotid artery disease,  HFpEF, preDM,  hyperlipidemia, palpitations, CKD presents today for follow-up CV eval.    5/1/18  She has significant side effects from many BP meds, could not tolerate Verapamil recently. BP has improved, but sometimes BP gets too low - occasionally 119/53.   She thinks her BP is too low under 130 systolic and wants to decrease some meds. Discussed we can half the clonidine patch and monitor. Will continue other meds for now.She has a good log with BPs daily. Overall BP is well controlled now. Tries to eat low salt diet for the most part but is at Nursing home and can't always control the diet. Other main issue is her arm pain due to shoulder pain will see pain management specialist Dr. Chin.     5/24/18  Has a lot of pain in both arms, will be seeing Dr. Chin and then may need surgery by Dr. Lujan. Reviewed BP log - mostly 130s-140s, once 96/58. Occasional palpitations - usually with waking up or sleeping.     6/11/18  Pt has been getting painful rash at site of clonidine. Also has an infection possibly at left shoulder where procedure happened for shoulders. She is also seeing surgery today for shoulder pain. Pt also feels very weak due to clonidine and has trouble getting up with 0.2 mg patch. Last night BP went up to 190/71 and took a clonidine .1 mg PO dose  which improved BP. Bp mildly elevated today but also in a lot of pain.     7/24/18  Pt is scheduled for shoulder surgery on shoulder Aug 21st. She has been getting accupuncture which has helped to walk. She has stopped clonidine patch is taking pills BID/TID. BP overall have been well controlled, 140-150s/50-60s. Otherwise feels ok.     11/13/18  She is s/p L shoulder surgery currently undergoing PT. Pt had reverse joint repair/surgery due to a cyst in the joint/bone. Has another month of rehab at least. BP overall has been in 130s/60s. No further dizziness, has stopped metoprolol.   ECG - NSR    6/27/19  She had some allergy to chemicals at [facility] while something was sprayed. She had sneezing, runny nose. She was taking echinacea and other herbal meds which helped. She then took Benadryl and accupuncture. BP has been up for a month. She was off metoprolol, doubled clonidine and still elevated. Discussed wants lower HCTZ but BP has been normal at home as well.   ECG - NSR    2/17/20  She was admitted for afib RVR. Patient was started on amiodarone drip and converted to NSR overnight. She was started on PO amiodarone and discharged home with instructions to follow up with cards outpatient.  She went to ER after DC for HTN urgency - she was given HCTZ.      3/25/21  Increased Imdur last visit. /78 today, HR 60s. She has been having more edema, not taking lasix as much.     5/6/21  BP 150s/70s. HR 66.  increased lasix to 40mg weekly PRN. She feels weak still with dyspnea. She had LE u/s and referred for Dr. Ya no angio now but will f/u.     6/17/21  BP elevated today 170s/60s. HR 60s. She has been having more problems with L leg. She has been having sharp L leg pain. Does not want to see pain management.     9/23/21  Lipids improved slightly from last year. BP is much better, has no further low BPs since lowering Imdur. She has been feeling better off amiodarone. She had an injection in tooth and had root  canal which improved. She will see Dr. Mendoza at [facility] - pul.     11/30/21  BP and HR well controlled today. She had a dental infection had oral surgery since last visit, she could not chew much so ate ice cream and soft diet. She is off abx.   BP at home low 110/70s.   ECG - NSR, PACs    1/13/22  BP 140s/80s. HR 70s. She changed BP meds - took Imdur only evening, and stopped HCTZ.    3/24/22  BP is elevated 160s/70s. HR 80s. She had a lung biopsy at [facility] Jan 2022 - neg for cancer, scar noted. But sample size reported to be too small to r/o other causes of nodule - infection/amio/mold neg.     6/15/22  BP occ low 100s systolic's.  BP today 140/80. HR 85. BMI 32 - 187 lbs. She had one episode of BP elevated middle of the night improved with clonidine. She had more arthritis flare ups now. She has more knee pain, will do PT/OT.     Patient feels no chest pain, no sob, no leg swelling, no PND,  no dizziness, no syncope, no CNS symptoms.    Patient is compliant with medications.    Carotid u/s 2/2020  Conclusion    · There is 20-39% right Internal Carotid Stenosis.  · There is 20-39% left Internal Carotid Stenosis.          LE u/s  · There is no evidence of a right lower extremity DVT.  · There is no evidence of a left lower extremity DVT.       CONCLUSIONS     1 - Normal left ventricular systolic function (EF 55-60%).     2 - Normal left ventricular diastolic function.     3 - Normal right ventricular systolic function .     4 - Mild to moderate mitral regurgitation.     5 - Concentric hypertrophy.     This document has been electronically    SIGNED BY: Pete Durán MD On: 10/09/2019 20:00    LE u/s  · 50-99% stenosis bilateral SFA with biphasic and monophasic waveforms  · Moderate to severe PAD BLE    Past Medical History:   Diagnosis Date    Anemia 11/29/2018    Anxiety 11/28/2017    Arthritis     Atrial fibrillation with RVR 10/9/2019    Back pain     Basal cell carcinoma 03/29/2018    left mid back     Chronic diastolic congestive heart failure 10/15/2019    CKD (chronic kidney disease) stage 3, GFR 30-59 ml/min 8/8/2016    Colon polyps     reported per patient.     Coronary artery calcification 10/5/2021    Fuchs' corneal dystrophy     General anesthetics causing adverse effect in therapeutic use     woke up during surgery and gagged on ET tube    Glaucoma     Glaucoma     Heart failure with preserved left ventricular function (HFpEF) 7/24/2018    Hyperlipidemia     Hypertension     Macular degeneration dry    Obesity     PVD (peripheral vascular disease) 4/26/2021    PVD (peripheral vascular disease) 4/26/2021    Squamous cell carcinoma 01/08/2019    left shoulder    Stenosis of carotid artery 10/5/2021    Trouble in sleeping     Type 2 diabetes mellitus without complication, without long-term current use of insulin 2/24/2021    Urinary tract infection        Past Surgical History:   Procedure Laterality Date    ADENOIDECTOMY  1938    BREAST BIOPSY Left     1997. benign    BREAST CYST EXCISION Left 1997 approx    CARPAL TUNNEL RELEASE Right 2012 approx    CATARACT EXTRACTION Bilateral 1994    CHOLECYSTECTOMY  1975    CORNEAL TRANSPLANT Bilateral 08/2015 8/2015 - left; Right 4/2016    EYE SURGERY      Fuch's Corneal dystrophy - had 2nd operation with Dr. Quintanilla    EYE SURGERY      Cataract wIOL    left thumb Left 2011    removed cuboid for arthritis    REVERSE TOTAL SHOULDER ARTHROPLASTY Left 8/21/2018    Procedure: ARTHROPLASTY, SHOULDER, TOTAL, REVERSE;  Surgeon: Solomon Lujan MD;  Location: [facility] OR;  Service: Orthopedics;  Laterality: Left;  WITH BICEP TENODESIS    SKIN CANCER EXCISION Left 2017    BCC removal by Dr. Valadez    SLT- OS (aka TRABECULOPLASTY) Left 05/11/2011    repeat 3/14/12    TENOTOMY Left 8/21/2018    Procedure: TENOTOMY;  Surgeon: Solomon Lujan MD;  Location: [facility] OR;  Service: Orthopedics;  Laterality: Left;    TONSILLECTOMY  1938    TREATMENT  OF CARDIAC ARRHYTHMIA N/A 10/10/2019    Procedure: CARDIOVERSION;  Surgeon: Pete Durán MD;  Location: [facility] CATH LAB;  Service: Cardiology;  Laterality: N/A;    TREATMENT OF CARDIAC ARRHYTHMIA N/A 12/6/2019    Procedure: CARDIOVERSION;  Surgeon: Samy Castillo MD;  Location: [facility] CATH LAB;  Service: Cardiology;  Laterality: N/A;       Social History     Tobacco Use    Smoking status: Never Smoker    Smokeless tobacco: Never Used    Tobacco comment: history of passive smoking from her ex-   Substance Use Topics    Alcohol use: Yes     Alcohol/week: 2.0 standard drinks     Types: 2 Standard drinks or equivalent per week     Comment: occ    Drug use: No       Family History   Problem Relation Age of Onset    Heart disease Mother     Hypertension Mother     Cancer Father 88        sarcoma( ?[place]'s ) on leg    Deep vein thrombosis Neg Hx     Ovarian cancer Neg Hx     Breast cancer Neg Hx     Kidney disease Neg Hx     Strabismus Neg Hx     Retinal detachment Neg Hx     Macular degeneration Neg Hx     Glaucoma Neg Hx     Blindness Neg Hx     Amblyopia Neg Hx     Eczema Neg Hx     Lupus Neg Hx     Melanoma Neg Hx     Psoriasis Neg Hx     Diabetes Neg Hx     Stroke Neg Hx     Mental retardation Neg Hx     Mental illness Neg Hx     Hyperlipidemia Neg Hx     COPD Neg Hx     Asthma Neg Hx     Depression Neg Hx     Alcohol abuse Neg Hx     Drug abuse Neg Hx        Patient's Medications   New Prescriptions    No medications on file   Previous Medications    ACETAMINOPHEN (TYLENOL) 500 MG TABLET    Take 500 mg by mouth daily as needed. Taking 1 to 3    ALPRAZOLAM (XANAX) 0.5 MG TABLET    Take 1 tablet (0.5 mg total) by mouth nightly as needed for Anxiety.    APIXABAN (ELIQUIS) 2.5 MG TAB    Take 1 tablet (2.5 mg total) by mouth 2 (two) times daily.    B COMPLEX VITAMINS CAPSULE    Take 1 capsule by mouth once daily.    CHOLECALCIFEROL, VITAMIN D3, (VITAMIN D3) 50 MCG (2,000 UNIT) CAP     Take 1 capsule by mouth once daily.    CLONIDINE (CATAPRES) 0.1 MG TABLET    Take 1 tablet (0.1 mg total) by mouth 2 (two) times daily as needed (systolic BP over 180).    COENZYME Q10 200 MG CAPSULE    Take 400 mg by mouth once daily.     FISH OIL-OMEGA-3 FATTY ACIDS 300-1,000 MG CAPSULE    Take 2 g by mouth 2 (two) times daily.     FUROSEMIDE (LASIX) 40 MG TABLET    Take 1 tablet (40 mg total) by mouth daily as needed (edema, swelling, fluid).    HYDROCHLOROTHIAZIDE (HYDRODIURIL) 12.5 MG TAB    Take 1 tablet (12.5 mg total) by mouth once daily.    ISOSORBIDE MONONITRATE (IMDUR) 30 MG 24 HR TABLET    Take 1 tablet (30 mg total) by mouth every evening.    LOSARTAN (COZAAR) 50 MG TABLET    Take 1 tablet (50 mg total) by mouth 2 (two) times daily.    LOTEPREDNOL (LOTEMAX) 0.5 % OPHTHALMIC SUSPENSION    Place 1 drop into the right eye once daily. Right eye only    METOPROLOL TARTRATE (LOPRESSOR) 25 MG TABLET    TAKE 1/2 TABLET BY MOUTH TWICE DAILY    MUPIROCIN (BACTROBAN) 2 % OINTMENT    apply to affected area twice a day with Q-tip. Antibiotic ointment.    NETARSUDIL (RHOPRESSA) 0.02 % OPHTHALMIC SOLUTION    Place 1 drop into the left eye every evening.    POLYETHYLENE GLYCOL 3350 (MIRALAX ORAL)    Take 17 g by mouth 2 (two) times a day. Taking BID    TRAVOPROST (TRAVATAN Z) 0.004 % OPHTHALMIC SOLUTION    PLACE 1 DROP INTO THE LEFT EYE EVERY EVENING.   Modified Medications    No medications on file   Discontinued Medications    LOTEPREDNOL ETABONATE (LOTEMAX SM) 0.38 % DRPG    Place 1 drop into both eyes once daily.       Review of Systems   Constitutional: Positive for fatigue and malaise/fatigue.   HENT: Negative.    Eyes: Negative.    Respiratory: Positive for shortness of breath.    Cardiovascular: Negative for leg swelling.   Gastrointestinal: Negative.    Genitourinary: Negative.    Musculoskeletal: Positive for back pain and joint pain.   Skin: Negative for rash.   Neurological: Negative for dizziness.  "  Endo/Heme/Allergies: Negative.    Psychiatric/Behavioral: Negative.        Wt Readings from Last 3 Encounters:   06/15/22 84.8 kg (187 lb)   06/08/22 85.3 kg (188 lb 0.8 oz)   05/23/22 85.5 kg (188 lb 7.9 oz)     Temp Readings from Last 3 Encounters:   05/23/22 97.4 °F (36.3 °C) (Tympanic)   04/25/22 99.3 °F (37.4 °C) (Tympanic)   03/16/22 99.2 °F (37.3 °C) (Tympanic)     BP Readings from Last 3 Encounters:   06/15/22 (!) 140/80   06/08/22 136/76   05/23/22 102/68     Pulse Readings from Last 3 Encounters:   06/15/22 86   06/08/22 75   05/23/22 75       BP (!) 140/80 Comment: standing for 5 minutes  Pulse 86   Ht 5' 4" (1.626 m)   Wt 84.8 kg (187 lb)   LMP  (LMP Unknown)   SpO2 98%   BMI 32.10 kg/m²     Objective:   Physical Exam  Vitals and nursing note reviewed.   Constitutional:       General: She is not in acute distress.     Appearance: She is well-developed. She is not diaphoretic.   HENT:      Head: Normocephalic and atraumatic.      Nose: Nose normal.   Eyes:      General: No scleral icterus.     Conjunctiva/sclera: Conjunctivae normal.   Neck:      Thyroid: No thyromegaly.      Vascular: No JVD.   Cardiovascular:      Rate and Rhythm: Normal rate and regular rhythm.      Heart sounds: S1 normal and S2 normal. No murmur heard.    No friction rub. No gallop. No S3 or S4 sounds.   Pulmonary:      Effort: Pulmonary effort is normal. No respiratory distress.      Breath sounds: Normal breath sounds. No stridor. No wheezing or rales.   Chest:      Chest wall: No tenderness.   Abdominal:      General: Bowel sounds are normal. There is no distension.      Palpations: Abdomen is soft. There is no mass.      Tenderness: There is no abdominal tenderness. There is no rebound.   Genitourinary:     Comments: Deferred  Musculoskeletal:         General: No tenderness or deformity. Normal range of motion.      Cervical back: Normal range of motion and neck supple.   Lymphadenopathy:      Cervical: No cervical " adenopathy.   Skin:     General: Skin is warm and dry.      Coloration: Skin is not pale.      Findings: No erythema or rash.   Neurological:      Mental Status: She is alert and oriented to person, place, and time.      Motor: No abnormal muscle tone.      Coordination: Coordination normal.   Psychiatric:         Behavior: Behavior normal.         Thought Content: Thought content normal.         Judgment: Judgment normal.         Lab Results   Component Value Date     04/26/2022    K 3.9 04/26/2022     04/26/2022    CO2 27 04/26/2022    BUN 27 (H) 04/26/2022    CREATININE 1.4 04/26/2022     (H) 04/26/2022    HGBA1C 5.9 (H) 03/08/2021    MG 2.1 02/12/2020    AST 21 01/24/2022    ALT 16 01/24/2022    ALBUMIN 3.7 04/26/2022    PROT 7.7 01/24/2022    BILITOT 0.3 01/24/2022    WBC 7.55 01/24/2022    HGB 13.0 01/24/2022    HCT 41.1 01/24/2022     (H) 01/24/2022     01/24/2022    INR 1.0 02/12/2020    TSH 1.412 01/24/2022    CHOL 232 (H) 07/19/2021    HDL 48 07/19/2021    LDLCALC 155.8 07/19/2021    TRIG 141 07/19/2021     (H) 03/08/2021     Assessment:      1. Primary hypertension    2. Hypertensive left ventricular hypertrophy, without heart failure    3. Calcification of aorta    4. PVC's (premature ventricular contractions)    5. Mixed hyperlipidemia    6. PAD (peripheral artery disease)    7. Stenosis of carotid artery, unspecified laterality    8. PAF (paroxysmal atrial fibrillation)    9. Carotid artery calcification, bilateral    10. 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 HTN  - occ labile - low  - adjust meds, Imdur, cont lasix 40 PRN; restart HCTZ daily  - takes 5 x week  - pt has numerous side effects to almost all other BP meds - norvasc, coreg, verapamil, BB  - cont low salt diet  - increased Losartan to 50mg BID --> dec to 25 mg BID    2. HLD  - cont low manuel diet  - rec Repatha/Praluent but does  not want now    3. HFpEF -  - cont low salt diet  - changed lasix to 40mg PRN - has not been using     4. CKD 3-4  - cont to monitor  - needs to f/u nephro     5. PAF - in NSR   - stop amio due to possible side effects  - cont Eliquis with BB    6. Edema with PVD; carotid artery disease - mild  - cont to monitor   - LE venous and arterial u/s with GEOVANI - moderate PAD; neg for DVT  - f/u with Dr. Ya as needed     7. PreDm/ Obesity - BMI 33 - 192 --> 187 lbs   - not on meds now  - rec low manuel diet and weight loss    8. Dyspnea  - will f/u with pulm - Dr. Mendoza at  clinic    - had lung biopsy at [facility] - neg for CA    Thank you for allowing me to participate in this patient's care. Please do not hesitate to contact me with any questions or concerns. Consult note has been forwarded to the referral physician.

## 2022-06-16 ENCOUNTER — TELEPHONE (OUTPATIENT)
Dept: PRIMARY CARE CLINIC | Facility: CLINIC | Age: 87
End: 2022-06-16
Payer: MEDICARE

## 2022-06-16 NOTE — TELEPHONE ENCOUNTER
Patient called in with complications with her pulse. She says it's 145 -150 doing nothing, just sitting. Her B/P this morning was 91/66. She says she did get it up & it's now 174/96 & 151/121. Pulse is still up in the 140's. Patient wants to be advised on what she should do. She says her diuretic the HCTZ she took it at 2 pm yesterday afternoon (low dose) she wants to know if this could be contributing to her issues.

## 2022-06-16 NOTE — TELEPHONE ENCOUNTER
----- Message from Sharmaine Bearden sent at 6/16/2022 10:13 AM CDT -----  States she is having some issues w/ her blood pressure. Please call pt [phone]. Thank you

## 2022-06-17 NOTE — TELEPHONE ENCOUNTER
Spoke to pt for f/u today. Feels fine. After taking additional metoprolol HR in 70s since, SBP 140s. Advised to increase metoprolol to 25 mg BID for HR control, notify me if HR <60 or >100. If symptomatic then call EMS. F/U with cardiology. Previously was taking amiodarone but this was stopped due to pulmonary sx. Taking Eliquis.

## 2022-06-28 ENCOUNTER — EXTERNAL HOME HEALTH (OUTPATIENT)
Dept: HOME HEALTH SERVICES | Facility: HOSPITAL | Age: 87
End: 2022-06-28
Payer: COMMERCIAL

## 2022-06-28 DIAGNOSIS — I12.9 CKD STAGE 4 SECONDARY TO HYPERTENSION: Primary | ICD-10-CM

## 2022-06-28 DIAGNOSIS — N18.4 CKD STAGE 4 SECONDARY TO HYPERTENSION: Primary | ICD-10-CM

## 2022-07-06 ENCOUNTER — TELEPHONE (OUTPATIENT)
Dept: OPHTHALMOLOGY | Facility: CLINIC | Age: 87
End: 2022-07-06
Payer: MEDICARE

## 2022-07-06 NOTE — TELEPHONE ENCOUNTER
I spoke with and offered her an earlier appt with one of our OD's however she stated that she would like to wait until her appt with Dr Ware.

## 2022-07-06 NOTE — TELEPHONE ENCOUNTER
----- Message from Ifrah Memo sent at 7/6/2022 11:40 AM CDT -----  Regarding: pt  Pt went to get her glasses and she stated that she can not see out of them. Pt would like to see the Dr. Ware to correct them. Please call back at [phone] or [phone]

## 2022-07-11 ENCOUNTER — TELEPHONE (OUTPATIENT)
Dept: PRIMARY CARE CLINIC | Facility: CLINIC | Age: 87
End: 2022-07-11
Payer: MEDICARE

## 2022-07-11 ENCOUNTER — OFFICE VISIT (OUTPATIENT)
Dept: PRIMARY CARE CLINIC | Facility: CLINIC | Age: 87
End: 2022-07-11
Payer: COMMERCIAL

## 2022-07-11 ENCOUNTER — HOSPITAL ENCOUNTER (INPATIENT)
Facility: HOSPITAL | Age: 87
LOS: 3 days | Discharge: HOME HEALTH | DRG: 309 | End: 2022-07-14
Attending: FAMILY MEDICINE | Admitting: FAMILY MEDICINE
Payer: COMMERCIAL

## 2022-07-11 ENCOUNTER — HOSPITAL ENCOUNTER (OUTPATIENT)
Dept: CARDIOLOGY | Facility: HOSPITAL | Age: 87
Discharge: HOME | End: 2022-07-11
Payer: COMMERCIAL

## 2022-07-11 VITALS
OXYGEN SATURATION: 97 % | TEMPERATURE: 99 F | HEIGHT: 64 IN | DIASTOLIC BLOOD PRESSURE: 72 MMHG | HEART RATE: 160 BPM | BODY MASS INDEX: 31.5 KG/M2 | WEIGHT: 184.5 LBS | SYSTOLIC BLOOD PRESSURE: 100 MMHG

## 2022-07-11 DIAGNOSIS — I48.92 ATRIAL FLUTTER WITH RAPID VENTRICULAR RESPONSE: ICD-10-CM

## 2022-07-11 DIAGNOSIS — I48.3 TYPICAL ATRIAL FLUTTER: ICD-10-CM

## 2022-07-11 DIAGNOSIS — R00.0 TACHYCARDIA: ICD-10-CM

## 2022-07-11 DIAGNOSIS — I48.0 PAF (PAROXYSMAL ATRIAL FIBRILLATION): Primary | ICD-10-CM

## 2022-07-11 DIAGNOSIS — I48.91 ATRIAL FIBRILLATION WITH RVR: Primary | ICD-10-CM

## 2022-07-11 DIAGNOSIS — I48.92 ATRIAL FLUTTER: ICD-10-CM

## 2022-07-11 PROBLEM — I48.4 ATYPICAL ATRIAL FLUTTER: Status: ACTIVE | Noted: 2020-02-12

## 2022-07-11 LAB
ALBUMIN SERPL BCP-MCNC: 3.8 G/DL (ref 3.5–5.2)
ALP SERPL-CCNC: 63 U/L (ref 55–135)
ALT SERPL W/O P-5'-P-CCNC: 13 U/L (ref 10–44)
ANION GAP SERPL CALC-SCNC: 13 MMOL/L (ref 8–16)
AST SERPL-CCNC: 15 U/L (ref 10–40)
BACTERIA #/AREA URNS HPF: NORMAL /HPF
BASOPHILS # BLD AUTO: 0.05 K/UL (ref 0–0.2)
BASOPHILS NFR BLD: 0.6 % (ref 0–1.9)
BILIRUB SERPL-MCNC: 0.3 MG/DL (ref 0.1–1)
BILIRUB UR QL STRIP: NEGATIVE
BNP SERPL-MCNC: 289 PG/ML (ref 0–99)
BUN SERPL-MCNC: 37 MG/DL (ref 8–23)
CALCIUM SERPL-MCNC: 10 MG/DL (ref 8.7–10.5)
CHLORIDE SERPL-SCNC: 105 MMOL/L (ref 95–110)
CK SERPL-CCNC: 70 U/L (ref 20–180)
CLARITY UR: CLEAR
CO2 SERPL-SCNC: 25 MMOL/L (ref 23–29)
COLOR UR: YELLOW
CREAT SERPL-MCNC: 1.5 MG/DL (ref 0.5–1.4)
DIFFERENTIAL METHOD: ABNORMAL
EOSINOPHIL # BLD AUTO: 0.2 K/UL (ref 0–0.5)
EOSINOPHIL NFR BLD: 1.8 % (ref 0–8)
ERYTHROCYTE [DISTWIDTH] IN BLOOD BY AUTOMATED COUNT: 12.3 % (ref 11.5–14.5)
EST. GFR  (AFRICAN AMERICAN): 35 ML/MIN/1.73 M^2
EST. GFR  (NON AFRICAN AMERICAN): 31 ML/MIN/1.73 M^2
GLUCOSE SERPL-MCNC: 129 MG/DL (ref 70–110)
GLUCOSE UR QL STRIP: NEGATIVE
HCT VFR BLD AUTO: 38.2 % (ref 37–48.5)
HGB BLD-MCNC: 12.9 G/DL (ref 12–16)
HGB UR QL STRIP: ABNORMAL
IMM GRANULOCYTES # BLD AUTO: 0.03 K/UL (ref 0–0.04)
IMM GRANULOCYTES NFR BLD AUTO: 0.4 % (ref 0–0.5)
KETONES UR QL STRIP: NEGATIVE
LEUKOCYTE ESTERASE UR QL STRIP: ABNORMAL
LYMPHOCYTES # BLD AUTO: 2.2 K/UL (ref 1–4.8)
LYMPHOCYTES NFR BLD: 26.4 % (ref 18–48)
MCH RBC QN AUTO: 32.3 PG (ref 27–31)
MCHC RBC AUTO-ENTMCNC: 33.8 G/DL (ref 32–36)
MCV RBC AUTO: 96 FL (ref 82–98)
MICROSCOPIC COMMENT: NORMAL
MONOCYTES # BLD AUTO: 1 K/UL (ref 0.3–1)
MONOCYTES NFR BLD: 11.3 % (ref 4–15)
NEUTROPHILS # BLD AUTO: 5.1 K/UL (ref 1.8–7.7)
NEUTROPHILS NFR BLD: 59.5 % (ref 38–73)
NITRITE UR QL STRIP: NEGATIVE
NRBC BLD-RTO: 0 /100 WBC
PH UR STRIP: 7 [PH] (ref 5–8)
PLATELET # BLD AUTO: 227 K/UL (ref 150–450)
PMV BLD AUTO: 9.6 FL (ref 9.2–12.9)
POTASSIUM SERPL-SCNC: 4.2 MMOL/L (ref 3.5–5.1)
PROT SERPL-MCNC: 7.5 G/DL (ref 6–8.4)
PROT UR QL STRIP: NEGATIVE
RBC # BLD AUTO: 3.99 M/UL (ref 4–5.4)
RBC #/AREA URNS HPF: 4 /HPF (ref 0–4)
SARS-COV-2 RDRP RESP QL NAA+PROBE: NEGATIVE
SODIUM SERPL-SCNC: 143 MMOL/L (ref 136–145)
SP GR UR STRIP: 1.01 (ref 1–1.03)
TROPONIN I SERPL DL<=0.01 NG/ML-MCNC: 0.09 NG/ML (ref 0–0.03)
URN SPEC COLLECT METH UR: ABNORMAL
UROBILINOGEN UR STRIP-ACNC: NEGATIVE EU/DL
WBC # BLD AUTO: 8.5 K/UL (ref 3.9–12.7)
WBC #/AREA URNS HPF: 3 /HPF (ref 0–5)
WBC CLUMPS URNS QL MICRO: NORMAL

## 2022-07-11 PROCEDURE — 83880 ASSAY OF NATRIURETIC PEPTIDE: CPT | Mod: HCNC | Performed by: NURSE PRACTITIONER

## 2022-07-11 PROCEDURE — 1159F MED LIST DOCD IN RCRD: CPT | Mod: HCNC,CPTII,S$GLB, | Performed by: NURSE PRACTITIONER

## 2022-07-11 PROCEDURE — U0002 COVID-19 LAB TEST NON-CDC: HCPCS | Mod: HCNC | Performed by: FAMILY MEDICINE

## 2022-07-11 PROCEDURE — 25000003 PHARM REV CODE 250: Mod: HCNC | Performed by: FAMILY MEDICINE

## 2022-07-11 PROCEDURE — 82550 ASSAY OF CK (CPK): CPT | Mod: HCNC | Performed by: NURSE PRACTITIONER

## 2022-07-11 PROCEDURE — 99999 PR PBB SHADOW E&M-EST. PATIENT-LVL V: ICD-10-PCS | Mod: PBBFAC,HCNC,, | Performed by: NURSE PRACTITIONER

## 2022-07-11 PROCEDURE — 25000003 PHARM REV CODE 250: Mod: HCNC | Performed by: INTERNAL MEDICINE

## 2022-07-11 PROCEDURE — 99999 PR PBB SHADOW E&M-EST. PATIENT-LVL V: CPT | Mod: PBBFAC,HCNC,, | Performed by: NURSE PRACTITIONER

## 2022-07-11 PROCEDURE — 81000 URINALYSIS NONAUTO W/SCOPE: CPT | Mod: HCNC | Performed by: FAMILY MEDICINE

## 2022-07-11 PROCEDURE — 1126F AMNT PAIN NOTED NONE PRSNT: CPT | Mod: HCNC,CPTII,S$GLB, | Performed by: NURSE PRACTITIONER

## 2022-07-11 PROCEDURE — 93010 ELECTROCARDIOGRAM REPORT: CPT | Mod: HCNC,,, | Performed by: INTERNAL MEDICINE

## 2022-07-11 PROCEDURE — 93005 ELECTROCARDIOGRAM TRACING: CPT | Mod: HCNC

## 2022-07-11 PROCEDURE — 93005 ELECTROCARDIOGRAM TRACING: CPT | Mod: HCNC,S$GLB,, | Performed by: NURSE PRACTITIONER

## 2022-07-11 PROCEDURE — 96365 THER/PROPH/DIAG IV INF INIT: CPT | Mod: HCNC

## 2022-07-11 PROCEDURE — 80053 COMPREHEN METABOLIC PANEL: CPT | Mod: HCNC | Performed by: NURSE PRACTITIONER

## 2022-07-11 PROCEDURE — 84484 ASSAY OF TROPONIN QUANT: CPT | Mod: HCNC | Performed by: NURSE PRACTITIONER

## 2022-07-11 PROCEDURE — 20000000 HC ICU ROOM: Mod: HCNC

## 2022-07-11 PROCEDURE — 99291 CRITICAL CARE FIRST HOUR: CPT | Mod: 25,HCNC

## 2022-07-11 PROCEDURE — 93010 EKG 12-LEAD: ICD-10-PCS | Mod: HCNC,,, | Performed by: INTERNAL MEDICINE

## 2022-07-11 PROCEDURE — 63600175 PHARM REV CODE 636 W HCPCS: Mod: HCNC | Performed by: INTERNAL MEDICINE

## 2022-07-11 PROCEDURE — 3288F FALL RISK ASSESSMENT DOCD: CPT | Mod: HCNC,CPTII,S$GLB, | Performed by: NURSE PRACTITIONER

## 2022-07-11 PROCEDURE — 99215 PR OFFICE/OUTPT VISIT, EST, LEVL V, 40-54 MIN: ICD-10-PCS | Mod: HCNC,S$GLB,, | Performed by: NURSE PRACTITIONER

## 2022-07-11 PROCEDURE — 93010 EKG 12-LEAD: ICD-10-PCS | Mod: HCNC,S$GLB,, | Performed by: INTERNAL MEDICINE

## 2022-07-11 PROCEDURE — 1101F PR PT FALLS ASSESS DOC 0-1 FALLS W/OUT INJ PAST YR: ICD-10-PCS | Mod: HCNC,CPTII,S$GLB, | Performed by: NURSE PRACTITIONER

## 2022-07-11 PROCEDURE — 1126F PR PAIN SEVERITY QUANTIFIED, NO PAIN PRESENT: ICD-10-PCS | Mod: HCNC,CPTII,S$GLB, | Performed by: NURSE PRACTITIONER

## 2022-07-11 PROCEDURE — 85025 COMPLETE CBC W/AUTO DIFF WBC: CPT | Mod: HCNC | Performed by: NURSE PRACTITIONER

## 2022-07-11 PROCEDURE — 1101F PT FALLS ASSESS-DOCD LE1/YR: CPT | Mod: HCNC,CPTII,S$GLB, | Performed by: NURSE PRACTITIONER

## 2022-07-11 PROCEDURE — 3288F PR FALLS RISK ASSESSMENT DOCUMENTED: ICD-10-PCS | Mod: HCNC,CPTII,S$GLB, | Performed by: NURSE PRACTITIONER

## 2022-07-11 PROCEDURE — 1159F PR MEDICATION LIST DOCUMENTED IN MEDICAL RECORD: ICD-10-PCS | Mod: HCNC,CPTII,S$GLB, | Performed by: NURSE PRACTITIONER

## 2022-07-11 PROCEDURE — 93010 ELECTROCARDIOGRAM REPORT: CPT | Mod: HCNC,S$GLB,, | Performed by: INTERNAL MEDICINE

## 2022-07-11 PROCEDURE — 93005 EKG 12-LEAD: ICD-10-PCS | Mod: HCNC,S$GLB,, | Performed by: NURSE PRACTITIONER

## 2022-07-11 PROCEDURE — 96375 TX/PRO/DX INJ NEW DRUG ADDON: CPT | Mod: HCNC

## 2022-07-11 PROCEDURE — 99215 OFFICE O/P EST HI 40 MIN: CPT | Mod: HCNC,S$GLB,, | Performed by: NURSE PRACTITIONER

## 2022-07-11 RX ORDER — GUAIFENESIN 100 MG/5ML
200 SOLUTION ORAL EVERY 4 HOURS PRN
Status: DISCONTINUED | OUTPATIENT
Start: 2022-07-11 | End: 2022-07-14 | Stop reason: HOSPADM

## 2022-07-11 RX ORDER — DILTIAZEM HCL 1 MG/ML
0-15 INJECTION, SOLUTION INTRAVENOUS CONTINUOUS
Status: DISCONTINUED | OUTPATIENT
Start: 2022-07-11 | End: 2022-07-13

## 2022-07-11 RX ORDER — DILTIAZEM HYDROCHLORIDE 5 MG/ML
10 INJECTION INTRAVENOUS
Status: COMPLETED | OUTPATIENT
Start: 2022-07-11 | End: 2022-07-11

## 2022-07-11 RX ORDER — ACETAMINOPHEN 325 MG/1
650 TABLET ORAL EVERY 6 HOURS PRN
Status: DISCONTINUED | OUTPATIENT
Start: 2022-07-11 | End: 2022-07-14 | Stop reason: HOSPADM

## 2022-07-11 RX ORDER — MAG HYDROX/ALUMINUM HYD/SIMETH 200-200-20
30 SUSPENSION, ORAL (FINAL DOSE FORM) ORAL EVERY 6 HOURS PRN
Status: DISCONTINUED | OUTPATIENT
Start: 2022-07-11 | End: 2022-07-14 | Stop reason: HOSPADM

## 2022-07-11 RX ORDER — POLYETHYLENE GLYCOL 3350 17 G/17G
17 POWDER, FOR SOLUTION ORAL 2 TIMES DAILY
Status: DISCONTINUED | OUTPATIENT
Start: 2022-07-12 | End: 2022-07-14 | Stop reason: HOSPADM

## 2022-07-11 RX ORDER — ONDANSETRON 2 MG/ML
4 INJECTION INTRAMUSCULAR; INTRAVENOUS EVERY 8 HOURS PRN
Status: DISCONTINUED | OUTPATIENT
Start: 2022-07-11 | End: 2022-07-14 | Stop reason: HOSPADM

## 2022-07-11 RX ORDER — POLYETHYLENE GLYCOL 3350 17 G/17G
17 POWDER, FOR SOLUTION ORAL 2 TIMES DAILY
Status: DISCONTINUED | OUTPATIENT
Start: 2022-07-11 | End: 2022-07-11 | Stop reason: SDUPTHER

## 2022-07-11 RX ADMIN — DILTIAZEM HYDROCHLORIDE 10 MG: 5 INJECTION INTRAVENOUS at 06:07

## 2022-07-11 RX ADMIN — Medication 2.5 MG/HR: at 08:07

## 2022-07-11 RX ADMIN — APIXABAN 2.5 MG: 2.5 TABLET, FILM COATED ORAL at 10:07

## 2022-07-11 RX ADMIN — AMIODARONE HYDROCHLORIDE 1 MG/MIN: 1.8 INJECTION, SOLUTION INTRAVENOUS at 10:07

## 2022-07-11 RX ADMIN — ACETAMINOPHEN 650 MG: 325 TABLET ORAL at 11:07

## 2022-07-11 RX ADMIN — DILTIAZEM HYDROCHLORIDE 10 MG: 5 INJECTION INTRAVENOUS at 07:07

## 2022-07-11 NOTE — PROGRESS NOTES
Shirley Metz  07/11/2022  [id number]    Heather Miller NP  Patient Care Team:  Heather Miller NP as PCP - General (Family Medicine)  Wilbert Ware MD as Consulting Physician (Ophthalmology)  Rajinder Quintanilla MD as Consulting Physician (Ophthalmology)  Suzan Gregorio PA-C as Physician Assistant (Rheumatology)  Rosario Valadez MD as Consulting Physician (Dermatology)  Trevon Ramirez MD as Consulting Physician (Cardiology)  Amish Mendoza MD (Pulmonary Disease)  Jaquan Choi MD as Consulting Physician (Vascular Surgery)  Debbie Choi MD (Inactive) as Consulting Physician (Gynecology)  Emmanuel Fish MD as Consulting Physician (Hand Surgery)  PAXTON Chaidez Jr., MD as Consulting Physician (Orthopedic Surgery)  Phylicia Deleon MD as Physician (Internal Medicine)        [facility] Primary Care Note      Chief Complaint:  Chief Complaint   Patient presents with    Low b/p; high pulse rate       History of Present Illness:  HPI   bpm presently. No sx.     Tachycardia and low BP. Onset 6 days ago. Initially pt had palpitations, no other associated sx per pt. Last episode of palpitations 3 days ago.  Pt attributes elevated HR to PT at home, although timing not directly related.  H/O Aflutter with RVR converted with amiodarone. Amiodarone eventually stopped due to pulmonary sx. Has taken apixiban and metoprolol since with good rate control until recently.     LOV 5/23 with Dr Deleon. HCTZ dose decreased. Hold losartan for low BP. Chronically labile BP.     Previously deferred referral to urology for hematuria, on apixaban.     Rheum appt for elevated VIRY 6/2/22: rheum workup unrevealing.     Cardiology 6/15: 1. HTN  - occ labile - low  - adjust meds, Imdur, cont lasix 40 PRN; restart HCTZ daily  - takes 5 x week  - pt has numerous side effects to almost all other BP meds - norvasc, coreg, verapamil, BB  - cont low salt diet  - increased Losartan to 50mg BID --> dec to 25 mg  BID        Review of Systems   Constitutional: Negative for fever and malaise/fatigue.   Respiratory: Negative for cough and shortness of breath.    Cardiovascular: Positive for palpitations. Negative for chest pain and leg swelling.   Gastrointestinal: Negative for heartburn, nausea and vomiting.   Genitourinary: Negative for dysuria.   Neurological: Negative for dizziness and headaches.         The following were reviewed: Active problem list, medication list, allergies, family history, social history, and Health Maintenance.     History:  Past Medical History:   Diagnosis Date    Anemia 11/29/2018    Anxiety 11/28/2017    Arthritis     Atrial fibrillation with RVR 10/9/2019    Back pain     Basal cell carcinoma 03/29/2018    left mid back    Chronic diastolic congestive heart failure 10/15/2019    CKD (chronic kidney disease) stage 3, GFR 30-59 ml/min 8/8/2016    Colon polyps     reported per patient.     Coronary artery calcification 10/5/2021    Fuchs' corneal dystrophy     General anesthetics causing adverse effect in therapeutic use     woke up during surgery and gagged on ET tube    Glaucoma     Glaucoma     Heart failure with preserved left ventricular function (HFpEF) 7/24/2018    Hyperlipidemia     Hypertension     Macular degeneration dry    Obesity     PVD (peripheral vascular disease) 4/26/2021    PVD (peripheral vascular disease) 4/26/2021    Squamous cell carcinoma 01/08/2019    left shoulder    Stenosis of carotid artery 10/5/2021    Trouble in sleeping     Type 2 diabetes mellitus without complication, without long-term current use of insulin 2/24/2021    Urinary tract infection      Past Surgical History:   Procedure Laterality Date    ADENOIDECTOMY  1938    BREAST BIOPSY Left     1997. benign    BREAST CYST EXCISION Left 1997 approx    CARPAL TUNNEL RELEASE Right 2012 approx    CATARACT EXTRACTION Bilateral 1994    CHOLECYSTECTOMY  1975    CORNEAL TRANSPLANT  Bilateral 08/2015 8/2015 - left; Right 4/2016    EYE SURGERY      Fuch's Corneal dystrophy - had 2nd operation with Dr. Quintanilla    EYE SURGERY      Cataract wIOL    left thumb Left 2011    removed cuboid for arthritis    REVERSE TOTAL SHOULDER ARTHROPLASTY Left 8/21/2018    Procedure: ARTHROPLASTY, SHOULDER, TOTAL, REVERSE;  Surgeon: Solomon Lujan MD;  Location: [facility] OR;  Service: Orthopedics;  Laterality: Left;  WITH BICEP TENODESIS    SKIN CANCER EXCISION Left 2017    BCC removal by Dr. Valadez    SLT- OS (aka TRABECULOPLASTY) Left 05/11/2011    repeat 3/14/12    TENOTOMY Left 8/21/2018    Procedure: TENOTOMY;  Surgeon: Solomon Lujan MD;  Location: [facility] OR;  Service: Orthopedics;  Laterality: Left;    TONSILLECTOMY  1938    TREATMENT OF CARDIAC ARRHYTHMIA N/A 10/10/2019    Procedure: CARDIOVERSION;  Surgeon: Pete Durán MD;  Location: [facility] CATH LAB;  Service: Cardiology;  Laterality: N/A;    TREATMENT OF CARDIAC ARRHYTHMIA N/A 12/6/2019    Procedure: CARDIOVERSION;  Surgeon: Samy Castillo MD;  Location: [facility] CATH LAB;  Service: Cardiology;  Laterality: N/A;     Family History   Problem Relation Age of Onset    Heart disease Mother     Hypertension Mother     Cancer Father 88        sarcoma( ?Kaposi's ) on leg    Deep vein thrombosis Neg Hx     Ovarian cancer Neg Hx     Breast cancer Neg Hx     Kidney disease Neg Hx     Strabismus Neg Hx     Retinal detachment Neg Hx     Macular degeneration Neg Hx     Glaucoma Neg Hx     Blindness Neg Hx     Amblyopia Neg Hx     Eczema Neg Hx     Lupus Neg Hx     Melanoma Neg Hx     Psoriasis Neg Hx     Diabetes Neg Hx     Stroke Neg Hx     Mental retardation Neg Hx     Mental illness Neg Hx     Hyperlipidemia Neg Hx     COPD Neg Hx     Asthma Neg Hx     Depression Neg Hx     Alcohol abuse Neg Hx     Drug abuse Neg Hx      Social History     Socioeconomic History    Marital status:     Number of children: 3   Tobacco Use     Smoking status: Never Smoker    Smokeless tobacco: Never Used    Tobacco comment: history of passive smoking from her ex-   Substance and Sexual Activity    Alcohol use: Yes     Alcohol/week: 2.0 standard drinks     Types: 2 Standard drinks or equivalent per week     Comment: occ    Drug use: No    Sexual activity: Not Currently     Partners: Male     Birth control/protection: Post-menopausal     Comment: discussed protection for STD's   Other Topics Concern    Are you pregnant or think you may be? No    Breast-feeding No   Social History Narrative     x 2,  x 1. Retired artist - previously doing jewelry/wall pieces; ; lives in [facility]; has 3 sons - 52( lives in [place], 54, and 56;exercises minimally - limited due to back issues. Still drives. Does have a Living Will.     Social Determinants of Health     Financial Resource Strain: Low Risk     Difficulty of Paying Living Expenses: Not hard at all   Food Insecurity: No Food Insecurity    Worried About Running Out of Food in the Last Year: Never true    Ran Out of Food in the Last Year: Never true   Transportation Needs: No Transportation Needs    Lack of Transportation (Medical): No    Lack of Transportation (Non-Medical): No   Physical Activity: Insufficiently Active    Days of Exercise per Week: 3 days    Minutes of Exercise per Session: 10 min   Stress: No Stress Concern Present    Feeling of Stress : Not at all   Social Connections: Moderately Isolated    Frequency of Communication with Friends and Family: Three times a week    Frequency of Social Gatherings with Friends and Family: Three times a week    Attends Taoist Services: Never    Active Member of Clubs or Organizations: Yes    Attends Club or Organization Meetings: More than 4 times per year    Marital Status:    Housing Stability: Low Risk     Unable to Pay for Housing in the Last Year: No    Number of Places Lived in  the Last Year: 1    Unstable Housing in the Last Year: No     Patient Active Problem List   Diagnosis    HTN (hypertension)    Mixed hyperlipidemia    Primary osteoarthritis involving multiple joints    Macular degeneration - Both Eyes    Fuch's endothelial dystrophy - Both Eyes    Lens replaced by other means    COAG (chronic open-angle glaucoma) - Both Eyes    Blepharitis, unspecified - Both Eyes    Abnormal ECG    PVC's (premature ventricular contractions)    Other microscopic hematuria    LVH (left ventricular hypertrophy) due to hypertensive disease    Osteopenia of hip    Myalgia    Calcification of aorta    Neuropathy    Unequal blood pressures in paired extremities    Medication side effects    History of cornea transplant    Pseudophakia    Insomnia    Anxiety    Pre-diabetes    Obesity (BMI 30.0-34.9)    Bilateral shoulder region arthritis    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    Anemia    Thrombus of left atrial appendage    PAF (paroxysmal atrial fibrillation)    Elevated liver enzymes    Atrial flutter with rapid ventricular response    Chronic anticoagulation    Keratitis sicca, bilateral    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    PAD (peripheral artery disease)    Statin intolerance    Pulmonary nodules/lesions, multiple    Spinal stenosis at L4-L5 level    Pulmonary granuloma    Lumbar spondylosis    Bilateral recurrent inguinal hernia without obstruction or gangrene    LLQ abdominal pain    Pain in left leg    Cellulitis, face    Rectal abnormality    Stenosis of carotid artery    Carotid artery calcification, bilateral    B12 deficiency    Seasonal allergic rhinitis     Review of patient's allergies indicates:   Allergen Reactions    Carvedilol Swelling     lips  lips  Other reaction(s): swelling    Cephalexin Other (See Comments)     Muscle/joint weakness unable to move     Doxycycline  calcium Other (See Comments)     Weakness nausea   sob  Other reaction(s): weakness, nausea, SOB    Erythromycin Other (See Comments)     Complete weakness/could not walk    Gadolinium-containing contrast media Other (See Comments)     angioedema  Other reaction(s): Angioedema    Hydrocodone-acetaminophen      wheezing  wheezing  wheezing  Other reaction(s): wheezing    Inveltys [loteprednol etabonate] Palpitations and Other (See Comments)     Patient stated bp went up.    Labetalol Shortness Of Breath, Nausea Only and Other (See Comments)     Palpitations, LE weakness  Other reaction(s): SOB, palpitations, weakness    Loratadine Other (See Comments)     Double vision/spots  Other reaction(s): double vision    Macrolide antibiotics Other (See Comments)     Complete weakness/could not walk  Complete weakness/could not walk  Complete weakness/could not walk  Complete weakness/could not walk  Other reaction(s): complete weakness    Moxifloxacin Other (See Comments)     Hives   muscle soreness  Other reaction(s): hives, muscle soreness    Naproxen      wheezing  wheezing  wheezing  Other reaction(s): wheezing    Statins-hmg-coa reductase inhibitors Other (See Comments)     Severe Muscle weakness  Muscle weakness  Severe Muscle weakness  Other reaction(s): severe muscle weakness    Timolol Other (See Comments)     Severe muscle weakness, eye problems.  Other reaction(s): severe muscle weakness    Verapamil Diarrhea     Severe diarrhea.  Other reaction(s): diarrhea    Amlodipine      Myalgias    Other reaction(s): myalgian    Doxycycline     Fenofibrate      Causes muscle weakness  Other reaction(s): muscle weakness    Hydralazine      Other reaction(s): muscle tremors    Hydralazine analogues      Muscle tremors/aches      Hydrocortisone     Isosorbide      Tolerates Imdur    Loteprednol      Other reaction(s): increased BP    Tetanus and diphtheria toxoids     Adhesive Rash     Clonidine patch - 2  mfg - contact dermatitis    Codeine Diarrhea and Other (See Comments)     Loss of balance   Other reaction(s): loss of balance    Doxazosin Palpitations     Other reaction(s): palpitations    Fexofenadine Other (See Comments)     Double vision/spots   Other reaction(s): double vision    Hydrocortisone (bulk) Other (See Comments)     Only suppository/ caused muscle weakness    Latanoprost Other (See Comments)     Muscle weakness  Other reaction(s): muscle weakness    Olopatadine Other (See Comments)     Muscle weakness  Other reaction(s): muscle weakness    Prednisone Anxiety       Medications:  Current Outpatient Medications on File Prior to Visit   Medication Sig Dispense Refill    acetaminophen (TYLENOL) 500 MG tablet Take 500 mg by mouth daily as needed. Taking 1 to 3      ALPRAZolam (XANAX) 0.5 MG tablet Take 1 tablet (0.5 mg total) by mouth nightly as needed for Anxiety. 30 tablet 5    apixaban (ELIQUIS) 2.5 mg Tab Take 1 tablet (2.5 mg total) by mouth 2 (two) times daily. 180 tablet 3    b complex vitamins capsule Take 1 capsule by mouth once daily.      cholecalciferol, vitamin D3, (VITAMIN D3) 50 mcg (2,000 unit) Cap Take 1 capsule by mouth once daily.      cloNIDine (CATAPRES) 0.1 MG tablet Take 1 tablet (0.1 mg total) by mouth 2 (two) times daily as needed (systolic BP over 180). 20 tablet 1    coenzyme Q10 200 mg capsule Take 400 mg by mouth once daily.       fish oil-omega-3 fatty acids 300-1,000 mg capsule Take 2 g by mouth 2 (two) times daily.       furosemide (LASIX) 40 MG tablet Take 1 tablet (40 mg total) by mouth daily as needed (edema, swelling, fluid). 60 tablet 3    hydroCHLOROthiazide (HYDRODIURIL) 12.5 MG Tab Take 1 tablet (12.5 mg total) by mouth once daily. 30 tablet 11    isosorbide mononitrate (IMDUR) 30 MG 24 hr tablet Take 1 tablet (30 mg total) by mouth every evening. 90 tablet 1    losartan (COZAAR) 25 MG tablet Take 1 tablet (25 mg total) by mouth 2 (two) times  daily. 180 tablet 1    metoprolol tartrate (LOPRESSOR) 25 MG tablet TAKE 1/2 TABLET BY MOUTH TWICE DAILY 90 tablet 0    mupirocin (BACTROBAN) 2 % ointment apply to affected area twice a day with Q-tip. Antibiotic ointment. 30 g 1    netarsudiL (RHOPRESSA) 0.02 % ophthalmic solution Place 1 drop into the left eye every evening. 2.5 mL 12    POLYETHYLENE GLYCOL 3350 (MIRALAX ORAL) Take 17 g by mouth 2 (two) times a day. Taking BID      travoprost (TRAVATAN Z) 0.004 % ophthalmic solution PLACE 1 DROP INTO THE LEFT EYE EVERY EVENING. 2.5 mL 10    loteprednol (LOTEMAX) 0.5 % ophthalmic suspension Place 1 drop into the right eye once daily. Right eye only (Patient not taking: Reported on 7/11/2022) 5 mL 11     Current Facility-Administered Medications on File Prior to Visit   Medication Dose Route Frequency Provider Last Rate Last Admin    sodium chloride 0.9% flush 3 mL  3 mL Intravenous PRN Steve Galarza PA-C           Medications have been reviewed and reconciled with patient at visit today.    Barriers to medications present (no )    Adverse reactions to current medications (no)      Exam:  Vitals:    07/11/22 1412   BP: 100/72   Pulse: (!) 160   Temp:      Weight: 83.7 kg (184 lb 8.4 oz)   Body mass index is 31.67 kg/m².      BP Readings from Last 3 Encounters:   07/11/22 100/72   06/15/22 (!) 140/80   06/08/22 136/76     Wt Readings from Last 3 Encounters:   07/11/22 1354 83.7 kg (184 lb 8.4 oz)   06/15/22 1320 84.8 kg (187 lb)   06/08/22 0812 85.3 kg (188 lb 0.8 oz)            Physical Exam  Constitutional:       Appearance: She is ill-appearing (mildly).   Eyes:      General: No scleral icterus.  Cardiovascular:      Rate and Rhythm: Regular rhythm. Tachycardia present.   Pulmonary:      Effort: Pulmonary effort is normal. No respiratory distress.      Breath sounds: Normal breath sounds.   Abdominal:      Palpations: Abdomen is soft.      Tenderness: There is no abdominal tenderness.    Musculoskeletal:         General: No swelling.      Cervical back: Neck supple.   Skin:     General: Skin is warm.      Comments: Diaphoretic   Neurological:      Mental Status: She is alert. Mental status is at baseline.   Psychiatric:         Mood and Affect: Mood normal.         Thought Content: Thought content normal.         Laboratory Reviewed: (Yes)  Lab Results   Component Value Date    WBC 7.55 01/24/2022    HGB 13.0 01/24/2022    HCT 41.1 01/24/2022     01/24/2022    CHOL 232 (H) 07/19/2021    TRIG 141 07/19/2021    HDL 48 07/19/2021    ALT 16 01/24/2022    AST 21 01/24/2022     04/26/2022    K 3.9 04/26/2022     04/26/2022    CREATININE 1.4 04/26/2022    BUN 27 (H) 04/26/2022    CO2 27 04/26/2022    TSH 1.412 01/24/2022    INR 1.0 02/12/2020    HGBA1C 5.9 (H) 03/08/2021           Health Maintenance  Health Maintenance Topics with due status: Not Due       Topic Last Completion Date    DEXA Scan 10/19/2020    Lipid Panel 07/19/2021    Pneumococcal Vaccines (Age 65+) 03/16/2022    Influenza Vaccine 03/24/2022     Health Maintenance Due   Topic Date Due    Shingles Vaccine (1 of 2) Never done    TETANUS VACCINE  11/09/2021    COVID-19 Vaccine (4 - Booster for Pfizer series) 12/29/2021       Assessment:  Problem List Items Addressed This Visit        Cardiac/Vascular    Atrial flutter with rapid ventricular response     Rate uncontrolle dand BP low. Discussed with Dr Ramirez. Recommends to ER for admission, STACEY and cardioversion.            Relevant Orders    Refer to Emergency Dept.    PAF (paroxysmal atrial fibrillation) - Primary    Relevant Orders    IN OFFICE EKG 12-LEAD (to [facility])        Pt declines transfer by ambulance. Son will transport pt to hospital. Presently alert and speaking on phone, no distress. Report phoned to Maritza in ER.    Plan:  PAF (paroxysmal atrial fibrillation)  -     IN OFFICE EKG 12-LEAD (to [facility])    Atrial flutter with rapid ventricular response  -      Refer to Emergency Dept.      -Patient's lab results were reviewed and discussed with patient  -Treatment options and alternatives were discussed with the patient. Patient expressed understanding. Patient was given the opportunity to ask questions and be an active participant in their medical care. Patient had no further questions or concerns at this time.   -Documentation of patient's health and condition was obtained from family member who was present during visit.  -Patient is an overall moderate risk for health complications from their medical conditions.       Follow up: Follow up after hospitalization.      After visit summary printed and given to patient upon discharge.  Patient goals and care plan are included in After visit summary.    Total medical decision making time was 42 min.  The following issues were discussed: The primary encounter diagnosis was PAF (paroxysmal atrial fibrillation). A diagnosis of Atrial flutter with rapid ventricular response was also pertinent to this visit.    Health maintenance needs, recent test results and goals of care discussed with pt and questions answered.

## 2022-07-11 NOTE — ASSESSMENT & PLAN NOTE
Rate uncontrolle dand BP low. Discussed with Dr Ramirez. Recommends to ER for admission, STACEY and cardioversion.

## 2022-07-11 NOTE — ED PROVIDER NOTES
SCRIBE #1 NOTE: I, Alexsander Zavala, am scribing for, and in the presence of, Adelita Henderson MD. I have scribed the entire note.       History     Chief Complaint   Patient presents with    Tachycardia     Pt seen at PCP and referred to ED. Pt A Fib with RVR. No CP SOB or other CC     Review of patient's allergies indicates:   Allergen Reactions    Carvedilol Swelling     lips  lips  Other reaction(s): swelling    Cephalexin Other (See Comments)     Muscle/joint weakness unable to move     Doxycycline calcium Other (See Comments)     Weakness nausea   sob  Other reaction(s): weakness, nausea, SOB    Erythromycin Other (See Comments)     Complete weakness/could not walk    Gadolinium-containing contrast media Other (See Comments)     angioedema  Other reaction(s): Angioedema    Hydrocodone-acetaminophen      wheezing  wheezing  wheezing  Other reaction(s): wheezing    Inveltys [loteprednol etabonate] Palpitations and Other (See Comments)     Patient stated bp went up.    Labetalol Shortness Of Breath, Nausea Only and Other (See Comments)     Palpitations, LE weakness  Other reaction(s): SOB, palpitations, weakness    Loratadine Other (See Comments)     Double vision/spots  Other reaction(s): double vision    Macrolide antibiotics Other (See Comments)     Complete weakness/could not walk  Complete weakness/could not walk  Complete weakness/could not walk  Complete weakness/could not walk  Other reaction(s): complete weakness    Moxifloxacin Other (See Comments)     Hives   muscle soreness  Other reaction(s): hives, muscle soreness    Naproxen      wheezing  wheezing  wheezing  Other reaction(s): wheezing    Statins-hmg-coa reductase inhibitors Other (See Comments)     Severe Muscle weakness  Muscle weakness  Severe Muscle weakness  Other reaction(s): severe muscle weakness    Timolol Other (See Comments)     Severe muscle weakness, eye problems.  Other reaction(s): severe muscle weakness    Verapamil  Diarrhea     Severe diarrhea.  Other reaction(s): diarrhea    Amlodipine      Myalgias    Other reaction(s): myalgian    Doxycycline     Fenofibrate      Causes muscle weakness  Other reaction(s): muscle weakness    Hydralazine      Other reaction(s): muscle tremors    Hydralazine analogues      Muscle tremors/aches      Hydrocortisone     Isosorbide      Tolerates Imdur    Loteprednol      Other reaction(s): increased BP    Tetanus and diphtheria toxoids     Adhesive Rash     Clonidine patch - 2 mfg - contact dermatitis    Codeine Diarrhea and Other (See Comments)     Loss of balance   Other reaction(s): loss of balance    Doxazosin Palpitations     Other reaction(s): palpitations    Fexofenadine Other (See Comments)     Double vision/spots   Other reaction(s): double vision    Hydrocortisone (bulk) Other (See Comments)     Only suppository/ caused muscle weakness    Latanoprost Other (See Comments)     Muscle weakness  Other reaction(s): muscle weakness    Olopatadine Other (See Comments)     Muscle weakness  Other reaction(s): muscle weakness    Prednisone Anxiety         History of Present Illness     HPI    7/11/2022, 6:36 PM  History obtained from the patient      History of Present Illness: Shirley Metz is a 89 y.o. female patient with a PMHx of HLD, Afib, CHF, CKD  who presents to the Emergency Department for evaluation of tachycardica. Pt was seen by PCP and referred to ED due to Afib with RVR. Symptoms are constant and moderate in severity. No mitigating or exacerbating factors reported. No associated sxs reported. Patient denies any CP, SOB, diaphoresis, HA, n/v, and all other sxs at this time. No further complaints or concerns at this time.       Arrival mode: Personal vehicle     PCP: Heather Miller NP        Past Medical History:  Past Medical History:   Diagnosis Date    Anemia 11/29/2018    Anxiety 11/28/2017    Arthritis     Atrial fibrillation with RVR 10/9/2019     Back pain     Basal cell carcinoma 03/29/2018    left mid back    Chronic diastolic congestive heart failure 10/15/2019    CKD (chronic kidney disease) stage 3, GFR 30-59 ml/min 8/8/2016    Colon polyps     reported per patient.     Coronary artery calcification 10/5/2021    Fuchs' corneal dystrophy     General anesthetics causing adverse effect in therapeutic use     woke up during surgery and gagged on ET tube    Glaucoma     Glaucoma     Heart failure with preserved left ventricular function (HFpEF) 7/24/2018    Hyperlipidemia     Hypertension     Macular degeneration dry    Obesity     PVD (peripheral vascular disease) 4/26/2021    PVD (peripheral vascular disease) 4/26/2021    Squamous cell carcinoma 01/08/2019    left shoulder    Stenosis of carotid artery 10/5/2021    Trouble in sleeping     Type 2 diabetes mellitus without complication, without long-term current use of insulin 2/24/2021    Urinary tract infection        Past Surgical History:  Past Surgical History:   Procedure Laterality Date    ADENOIDECTOMY  1938    BREAST BIOPSY Left     1997. benign    BREAST CYST EXCISION Left 1997 approx    CARPAL TUNNEL RELEASE Right 2012 approx    CATARACT EXTRACTION Bilateral 1994    CHOLECYSTECTOMY  1975    CORNEAL TRANSPLANT Bilateral 08/2015 8/2015 - left; Right 4/2016    EYE SURGERY      Fuch's Corneal dystrophy - had 2nd operation with Dr. Quintanilla    EYE SURGERY      Cataract wIOL    left thumb Left 2011    removed cuboid for arthritis    REVERSE TOTAL SHOULDER ARTHROPLASTY Left 8/21/2018    Procedure: ARTHROPLASTY, SHOULDER, TOTAL, REVERSE;  Surgeon: Solomon Lujan MD;  Location: [facility];  Service: Orthopedics;  Laterality: Left;  WITH BICEP TENODESIS    SKIN CANCER EXCISION Left 2017    BCC removal by Dr. Valadez    SLT- OS (aka TRABECULOPLASTY) Left 05/11/2011    repeat 3/14/12    TENOTOMY Left 8/21/2018    Procedure: TENOTOMY;  Surgeon: Solomon Lujan MD;   Location: [facility] OR;  Service: Orthopedics;  Laterality: Left;    TONSILLECTOMY  1938    TREATMENT OF CARDIAC ARRHYTHMIA N/A 10/10/2019    Procedure: CARDIOVERSION;  Surgeon: Pete Durán MD;  Location: [facility] CATH LAB;  Service: Cardiology;  Laterality: N/A;    TREATMENT OF CARDIAC ARRHYTHMIA N/A 12/6/2019    Procedure: CARDIOVERSION;  Surgeon: Samy Castillo MD;  Location: [facility] CATH LAB;  Service: Cardiology;  Laterality: N/A;         Family History:  Family History   Problem Relation Age of Onset    Heart disease Mother     Hypertension Mother     Cancer Father 88        sarcoma( ?[place]'s ) on leg    Deep vein thrombosis Neg Hx     Ovarian cancer Neg Hx     Breast cancer Neg Hx     Kidney disease Neg Hx     Strabismus Neg Hx     Retinal detachment Neg Hx     Macular degeneration Neg Hx     Glaucoma Neg Hx     Blindness Neg Hx     Amblyopia Neg Hx     Eczema Neg Hx     Lupus Neg Hx     Melanoma Neg Hx     Psoriasis Neg Hx     Diabetes Neg Hx     Stroke Neg Hx     Mental retardation Neg Hx     Mental illness Neg Hx     Hyperlipidemia Neg Hx     COPD Neg Hx     Asthma Neg Hx     Depression Neg Hx     Alcohol abuse Neg Hx     Drug abuse Neg Hx        Social History:  Social History     Tobacco Use    Smoking status: Never Smoker    Smokeless tobacco: Never Used    Tobacco comment: history of passive smoking from her ex-   Substance and Sexual Activity    Alcohol use: Yes     Alcohol/week: 2.0 standard drinks     Types: 2 Standard drinks or equivalent per week     Comment: occ    Drug use: No    Sexual activity: Not Currently     Partners: Male     Birth control/protection: Post-menopausal     Comment: discussed protection for STD's        Review of Systems     Review of Systems   Constitutional: Negative for fever.   HENT: Negative for sore throat.    Respiratory: Negative for shortness of breath.    Cardiovascular: Positive for palpitations and leg swelling. Negative for chest  pain.   Gastrointestinal: Negative for nausea.   Genitourinary: Negative for dysuria.   Musculoskeletal: Negative for back pain.   Skin: Negative for rash.   Neurological: Negative for weakness.   Hematological: Does not bruise/bleed easily.   All other systems reviewed and are negative.     Physical Exam     Initial Vitals [07/11/22 1608]   BP Pulse Resp Temp SpO2   (!) 151/85 (!) 151 18 98.2 °F (36.8 °C) 95 %      MAP       --          Physical Exam  Nursing Notes and Vital Signs Reviewed.  Constitutional: Patient is in no acute distress. Well-developed and well-nourished.  Head: Atraumatic. Normocephalic.  Eyes: PERRL. EOM intact. Conjunctivae are not pale. No scleral icterus.  ENT: Mucous membranes are moist. Oropharynx is clear and symmetric.    Neck: Supple. Full ROM. No lymphadenopathy.  Cardiovascular: Tachycardic. Irregularly regular rhythm. No murmurs, rubs, or gallops. Distal pulses are 2+ and symmetric.  Pulmonary/Chest: No respiratory distress. Clear to auscultation bilaterally. No wheezing or rales.  Abdominal: Soft and non-distended.  There is no tenderness.  No rebound, guarding, or rigidity. Good bowel sounds.  Genitourinary: No CVA tenderness  Musculoskeletal: Moves all extremities. No obvious deformities. 1+ BLE edema. No calf tenderness.  Skin: Warm and dry.  Neurological:  Alert, awake, and appropriate.  Normal speech.  No acute focal neurological deficits are appreciated.  Psychiatric: Normal affect. Good eye contact. Appropriate in content.     ED Course   Critical Care    Date/Time: 7/11/2022 8:29 PM  Performed by: Adelita Henderson MD  Authorized by: Adelita Henderson MD   Direct patient critical care time: 20 minutes  Additional history critical care time: 5 minutes  Ordering / reviewing critical care time: 5 minutes  Documentation critical care time: 10 minutes  Consulting other physicians critical care time: 5 minutes  Total critical care time (exclusive of procedural time) : 45  minutes  Critical care time was exclusive of separately billable procedures and treating other patients and teaching time.  Critical care was necessary to treat or prevent imminent or life-threatening deterioration of the following conditions: AFib with RVR.  Critical care was time spent personally by me on the following activities: blood draw for specimens, development of treatment plan with patient or surrogate, discussions with consultants, interpretation of cardiac output measurements, evaluation of patient's response to treatment, examination of patient, obtaining history from patient or surrogate, ordering and performing treatments and interventions, ordering and review of laboratory studies, ordering and review of radiographic studies, pulse oximetry, re-evaluation of patient's condition and review of old charts.        ED Vital Signs:  Vitals:    07/12/22 0845 07/12/22 0900 07/12/22 1045 07/12/22 1100   BP:  (!) 152/77 130/67 (!) 144/75   Pulse: (!) 142 (!) 134 107 107   Resp: (!) 32 (!) 23 20 19   Temp:       TempSrc:       SpO2: 97% 95% 95% 96%   Weight:       Height:        07/12/22 1130 07/12/22 1200 07/12/22 1230 07/12/22 1245   BP: (!) 141/87 (!) 129/98 135/80    Pulse: (!) 128 (!) 143 108 (!) 140   Resp: (!) 23 (!) 42 (!) 21 (!) 29   Temp: 97.6 °F (36.4 °C)      TempSrc: Oral      SpO2: 97% 98% 96% 96%   Weight:       Height:        07/12/22 1300 07/12/22 1330 07/12/22 1400 07/12/22 1430   BP: (!) 154/79 126/82 139/84 (!) 182/98   Pulse: (!) 143 (!) 125 (!) 137 (!) 143   Resp: (!) 35 (!) 22 (!) 23 (!) 31   Temp:       TempSrc:       SpO2: 95% 96% 98% 96%   Weight:       Height:        07/12/22 1500 07/12/22 1515 07/12/22 1530   BP: (!) 181/92 (!) 140/87 132/75   Pulse: (!) 139 (!) 138 (!) 138   Resp: (!) 28 (!) 34 (!) 26   Temp:  97.9 °F (36.6 °C)    TempSrc:  Oral    SpO2: 97% 97% 98%   Weight:      Height:          Abnormal Lab Results:  Labs Reviewed   CBC W/ AUTO DIFFERENTIAL - Abnormal; Notable  for the following components:       Result Value    RBC 3.99 (*)     MCH 32.3 (*)     All other components within normal limits   COMPREHENSIVE METABOLIC PANEL - Abnormal; Notable for the following components:    Glucose 129 (*)     BUN 37 (*)     Creatinine 1.5 (*)     eGFR if  35 (*)     eGFR if non  31 (*)     All other components within normal limits   TROPONIN I - Abnormal; Notable for the following components:    Troponin I 0.086 (*)     All other components within normal limits   B-TYPE NATRIURETIC PEPTIDE - Abnormal; Notable for the following components:     (*)     All other components within normal limits   URINALYSIS - Abnormal; Notable for the following components:    Occult Blood UA Trace (*)     Leukocytes, UA Trace (*)     All other components within normal limits   CK   SARS-COV-2 RNA AMPLIFICATION, QUAL   URINALYSIS MICROSCOPIC        All Lab Results:  Results for orders placed or performed during the hospital encounter of 07/11/22   CBC auto differential   Result Value Ref Range    WBC 8.50 3.90 - 12.70 K/uL    RBC 3.99 (L) 4.00 - 5.40 M/uL    Hemoglobin 12.9 12.0 - 16.0 g/dL    Hematocrit 38.2 37.0 - 48.5 %    MCV 96 82 - 98 fL    MCH 32.3 (H) 27.0 - 31.0 pg    MCHC 33.8 32.0 - 36.0 g/dL    RDW 12.3 11.5 - 14.5 %    Platelets 227 150 - 450 K/uL    MPV 9.6 9.2 - 12.9 fL    Immature Granulocytes 0.4 0.0 - 0.5 %    Gran # (ANC) 5.1 1.8 - 7.7 K/uL    Immature Grans (Abs) 0.03 0.00 - 0.04 K/uL    Lymph # 2.2 1.0 - 4.8 K/uL    Mono # 1.0 0.3 - 1.0 K/uL    Eos # 0.2 0.0 - 0.5 K/uL    Baso # 0.05 0.00 - 0.20 K/uL    nRBC 0 0 /100 WBC    Gran % 59.5 38.0 - 73.0 %    Lymph % 26.4 18.0 - 48.0 %    Mono % 11.3 4.0 - 15.0 %    Eosinophil % 1.8 0.0 - 8.0 %    Basophil % 0.6 0.0 - 1.9 %    Differential Method Automated    Comprehensive metabolic panel   Result Value Ref Range    Sodium 143 136 - 145 mmol/L    Potassium 4.2 3.5 - 5.1 mmol/L    Chloride 105 95 - 110 mmol/L     CO2 25 23 - 29 mmol/L    Glucose 129 (H) 70 - 110 mg/dL    BUN 37 (H) 8 - 23 mg/dL    Creatinine 1.5 (H) 0.5 - 1.4 mg/dL    Calcium 10.0 8.7 - 10.5 mg/dL    Total Protein 7.5 6.0 - 8.4 g/dL    Albumin 3.8 3.5 - 5.2 g/dL    Total Bilirubin 0.3 0.1 - 1.0 mg/dL    Alkaline Phosphatase 63 55 - 135 U/L    AST 15 10 - 40 U/L    ALT 13 10 - 44 U/L    Anion Gap 13 8 - 16 mmol/L    eGFR if African American 35 (A) >60 mL/min/1.73 m^2    eGFR if non African American 31 (A) >60 mL/min/1.73 m^2   Troponin I   Result Value Ref Range    Troponin I 0.086 (H) 0.000 - 0.026 ng/mL   CPK   Result Value Ref Range    CPK 70 20 - 180 U/L   Brain natriuretic peptide   Result Value Ref Range     (H) 0 - 99 pg/mL   Urinalysis - Clean Catch   Result Value Ref Range    Specimen UA Urine, Clean Catch     Color, UA Yellow Yellow, Straw, Pallavi    Appearance, UA Clear Clear    pH, UA 7.0 5.0 - 8.0    Specific Gravity, UA 1.015 1.005 - 1.030    Protein, UA Negative Negative    Glucose, UA Negative Negative    Ketones, UA Negative Negative    Bilirubin (UA) Negative Negative    Occult Blood UA Trace (A) Negative    Nitrite, UA Negative Negative    Urobilinogen, UA Negative <2.0 EU/dL    Leukocytes, UA Trace (A) Negative   COVID-19 Rapid Screening   Result Value Ref Range    SARS-CoV-2 RNA, Amplification, Qual Negative Negative   Urinalysis Microscopic   Result Value Ref Range    RBC, UA 4 0 - 4 /hpf    WBC, UA 3 0 - 5 /hpf    WBC Clumps, UA Rare None-Rare    Bacteria Rare None-Occ /hpf    Microscopic Comment SEE COMMENT    CBC Auto Differential   Result Value Ref Range    WBC 7.89 3.90 - 12.70 K/uL    RBC 4.14 4.00 - 5.40 M/uL    Hemoglobin 13.1 12.0 - 16.0 g/dL    Hematocrit 40.3 37.0 - 48.5 %    MCV 97 82 - 98 fL    MCH 31.6 (H) 27.0 - 31.0 pg    MCHC 32.5 32.0 - 36.0 g/dL    RDW 12.1 11.5 - 14.5 %    Platelets 230 150 - 450 K/uL    MPV 9.7 9.2 - 12.9 fL    Immature Granulocytes 0.3 0.0 - 0.5 %    Gran # (ANC) 4.0 1.8 - 7.7 K/uL    Immature  Grans (Abs) 0.02 0.00 - 0.04 K/uL    Lymph # 2.6 1.0 - 4.8 K/uL    Mono # 1.0 0.3 - 1.0 K/uL    Eos # 0.2 0.0 - 0.5 K/uL    Baso # 0.06 0.00 - 0.20 K/uL    nRBC 0 0 /100 WBC    Gran % 50.6 38.0 - 73.0 %    Lymph % 32.7 18.0 - 48.0 %    Mono % 12.9 4.0 - 15.0 %    Eosinophil % 2.7 0.0 - 8.0 %    Basophil % 0.8 0.0 - 1.9 %    Differential Method Automated    Magnesium   Result Value Ref Range    Magnesium 1.8 1.6 - 2.6 mg/dL   Comprehensive Metabolic Panel   Result Value Ref Range    Sodium 143 136 - 145 mmol/L    Potassium 3.9 3.5 - 5.1 mmol/L    Chloride 104 95 - 110 mmol/L    CO2 24 23 - 29 mmol/L    Glucose 118 (H) 70 - 110 mg/dL    BUN 36 (H) 8 - 23 mg/dL    Creatinine 1.6 (H) 0.5 - 1.4 mg/dL    Calcium 10.0 8.7 - 10.5 mg/dL    Total Protein 7.6 6.0 - 8.4 g/dL    Albumin 3.8 3.5 - 5.2 g/dL    Total Bilirubin 0.3 0.1 - 1.0 mg/dL    Alkaline Phosphatase 66 55 - 135 U/L    AST 15 10 - 40 U/L    ALT 13 10 - 44 U/L    Anion Gap 15 8 - 16 mmol/L    eGFR if African American 33 (A) >60 mL/min/1.73 m^2    eGFR if non African American 28 (A) >60 mL/min/1.73 m^2   Troponin I   Result Value Ref Range    Troponin I 0.091 (H) 0.000 - 0.026 ng/mL   Echo   Result Value Ref Range    BSA 1.94 m2    TDI SEPTAL 0.07 m/s    LV LATERAL E/E' RATIO 10.00 m/s    LV SEPTAL E/E' RATIO 15.71 m/s    IVC diameter 1.35 cm    Left Ventricular Outflow Tract Mean Velocity 0.500124699 cm/s    Left Ventricular Outflow Tract Mean Gradient 1.64 mmHg    TDI LATERAL 0.11 m/s    LVIDd 3.31 (A) 3.5 - 6.0 cm    IVS 1.55 (A) 0.6 - 1.1 cm    Posterior Wall 1.38 (A) 0.6 - 1.1 cm    Ao root annulus 3.06 cm    LVIDs 2.42 2.1 - 4.0 cm    FS 27 28 - 44 %    Sinus 3.25 cm    STJ 3.12 cm    Ascending aorta 3.06 cm    LV mass 172.58 g    LA size 3.11 cm    TAPSE 2.13 cm    Left Ventricle Relative Wall Thickness 0.83 cm    AV mean gradient 6 mmHg    AV valve area 2.07 cm2    AV Velocity Ratio 0.91     AV index (prosthetic) 0.61     MV valve area p 1/2 method 3.64  cm2    E/A ratio 3.24     Mean e' 0.09 m/s    E wave deceleration time 208.203213020106062 msec    IVRT 71.747846283245019 msec    LVOT diameter 2.08 cm    LVOT area 3.4 cm2    LVOT peak jus 1.20 m/s    LVOT peak VTI 15.90 cm    Ao peak jus 1.32 m/s    Ao VTI 26.1 cm    RVOT peak jus 0.90 m/s    RVOT peak VTI 5.6 cm    Mr max jus 4.52 m/s    LVOT stroke volume 54.00 cm3    AV peak gradient 7 mmHg    PV mean gradient 1.53 mmHg    E/E' ratio 12.22 m/s    MV Peak E Jus 1.10 m/s    TR Max Jus 2.74 m/s    MV stenosis pressure 1/2 time 60.595052065894135 ms    MV Peak A Jus 0.34 m/s    LV Systolic Volume 20.57 mL    LV Systolic Volume Index 10.9 mL/m2    LV Diastolic Volume 44.46 mL    LV Diastolic Volume Index 23.52 mL/m2    LV Mass Index 91 g/m2    RA Major Axis 3.77 cm    Left Atrium Minor Axis 4.11 cm    Left Atrium Major Axis 4.21 cm    Triscuspid Valve Regurgitation Peak Gradient 30 mmHg    RA Width 3.26 cm    EF 60 %   POCT glucose   Result Value Ref Range    POCT Glucose 141 (H) 70 - 110 mg/dL     *Note: Due to a large number of results and/or encounters for the requested time period, some results have not been displayed. A complete set of results can be found in Results Review.       Imaging Results:  Imaging Results    None        The EKG was ordered, reviewed, and independently interpreted by the ED provider.  Interpretation time: 16:08  Rate: 151 BPM  Rhythm: atrial flutter with 2:1 AV conduction  Interpretation: Lateral infarct, age undetermined. No STEMI.         The Emergency Provider reviewed the vital signs and test results, which are outlined above.     ED Discussion     8:16 PM: Discussed pt's case with Dr. Knutson (Cardiology) who states EKG is AFlutter, continue diltiazem drip, would go to amiodarone drip if no effect of prior drip    8:27 PM: Discussed case with Laz Christiansen NP (Hospital Medicine). Dr. Nye agrees with current care and management of pt and accepts admission.   Admitting Service:  Hospital medicine  Admitting Physician: Dr. Nye  Admit to: Inpatient ICU    8:28 PM: Re-evaluated pt. I have discussed test results, shared treatment plan, and the need for admission with patient and family at bedside. Pt and family express understanding at this time and agree with all information. All questions answered. Pt and family have no further questions or concerns at this time. Pt is ready for admit.     Medical Decision Making:   Clinical Tests:   Lab Tests: Ordered and Reviewed  Medical Tests: Ordered and Reviewed           ED Medication(s):  Medications   diltiaZEM 125 mg in D5W 125 mL infusion (0 mg/hr Intravenous Stopped 7/11/22 2231)   apixaban tablet 2.5 mg (2.5 mg Oral Given 7/12/22 0936)   acetaminophen tablet 650 mg (650 mg Oral Given 7/12/22 1443)   ondansetron injection 4 mg (has no administration in time range)   guaiFENesin 100 mg/5 ml syrup 200 mg (has no administration in time range)   aluminum-magnesium hydroxide-simethicone 200-200-20 mg/5 mL suspension 30 mL (has no administration in time range)   polyethylene glycol packet 17 g (17 g Oral Given 7/12/22 0936)   amiodarone 360 mg/200 mL (1.8 mg/mL) infusion (1 mg/min Intravenous Verify Only 7/12/22 0400)   amiodarone 360 mg/200 mL (1.8 mg/mL) infusion (0.5 mg/min Intravenous New Bag 7/12/22 1544)   senna-docusate 8.6-50 mg per tablet 2 tablet (2 tablets Oral Given 7/12/22 0936)   glucose chewable tablet 16 g (has no administration in time range)   glucose chewable tablet 24 g (has no administration in time range)   glucagon (human recombinant) injection 1 mg (has no administration in time range)   dextrose 10% bolus 125 mL (has no administration in time range)   dextrose 10% bolus 250 mL (has no administration in time range)   insulin aspart U-100 pen 1-10 Units (has no administration in time range)   mupirocin 2 % ointment ( Nasal Given 7/12/22 0936)   famotidine tablet 20 mg (20 mg Oral Given 7/12/22 0936)   diltiaZEM injection 10 mg  (10 mg Intravenous Given 7/11/22 1848)   diltiaZEM injection 10 mg (10 mg Intravenous Given 7/11/22 1914)   amiodarone 360 mg/200 mL (1.8 mg/mL) infusion (1 mg/min Intravenous New Bag 7/11/22 2232)       Current Discharge Medication List                  Scribe Attestation:   Scribe #1: I performed the above scribed service and the documentation accurately describes the services I performed. I attest to the accuracy of the note.     Attending:   Physician Attestation Statement for Scribe #1: I, Adelita Henderson MD, personally performed the services described in this documentation, as scribed by Alxesander Zavala, in my presence, and it is both accurate and complete.           Clinical Impression       ICD-10-CM ICD-9-CM   1. Atrial fibrillation with RVR  I48.91 427.31   2. Tachycardia  R00.0 785.0   3. Typical atrial flutter  I48.3 427.32   4. Atrial flutter with rapid ventricular response  I48.92 427.32       Disposition:   Disposition: Admitted  Condition: Serious         Adelita Henderson MD  07/12/22 1608     Length To Time In Minutes Device Was In Place: 10

## 2022-07-11 NOTE — TELEPHONE ENCOUNTER
Patient was contacted & she was scheduled to be seen here in [facility] with VINITA Miller on [address]. Patient verbally understood the appointment information given.

## 2022-07-11 NOTE — FIRST PROVIDER EVALUATION
"Medical screening exam completed.  I have conducted a focused provider triage encounter, findings are as follows:    Brief history of present illness:  Patient presents to ER for evaluation of a-fib. Patient states she was at her PCP appointment and instructed to go to ER for a-fib.    Vitals:    07/11/22 1608   BP: (!) 151/85   BP Location: Right arm   Patient Position: Sitting   Pulse: (!) 151   Resp: 18   Temp: 98.2 °F (36.8 °C)   TempSrc: Oral   SpO2: 95%   Weight: 83.6 kg (184 lb 4.9 oz)   Height: 5' 4" (1.626 m)       Pertinent physical exam:  Respirations even, unlabored. No acute distress.    Brief workup plan:  Labs, EKG,     Preliminary workup initiated; this workup will be continued and followed by the physician or advanced practice provider that is assigned to the patient when roomed.  "

## 2022-07-11 NOTE — TELEPHONE ENCOUNTER
Patient called in stating that she has been having a high pulse with low b/p. She says her pulse has been in the  140's to 150's. She says she has tried contacting Dr. Ramirez office but hasn't gotten a reply yet. She wants to know if you want her to come in to be seen? Blood pressure has been running around 110/60.

## 2022-07-11 NOTE — TELEPHONE ENCOUNTER
----- Message from Arvind George sent at 7/11/2022  8:14 AM CDT -----  Contact: self  Pt would like to consult with nurse regarding blood pressure.  Please contact Shirley Metz @ [phone].  Thanks/As

## 2022-07-12 PROBLEM — R79.89 ELEVATED SERUM CREATININE: Status: ACTIVE | Noted: 2022-07-12

## 2022-07-12 LAB
ALBUMIN SERPL BCP-MCNC: 3.8 G/DL (ref 3.5–5.2)
ALP SERPL-CCNC: 66 U/L (ref 55–135)
ALT SERPL W/O P-5'-P-CCNC: 13 U/L (ref 10–44)
ANION GAP SERPL CALC-SCNC: 15 MMOL/L (ref 8–16)
AORTIC ROOT ANNULUS: 3.06 CM
ASCENDING AORTA: 3.06 CM
AST SERPL-CCNC: 15 U/L (ref 10–40)
AV INDEX (PROSTH): 0.61
AV MEAN GRADIENT: 6 MMHG
AV PEAK GRADIENT: 7 MMHG
AV VALVE AREA: 2.07 CM2
AV VELOCITY RATIO: 0.91
BASOPHILS # BLD AUTO: 0.06 K/UL (ref 0–0.2)
BASOPHILS NFR BLD: 0.8 % (ref 0–1.9)
BILIRUB SERPL-MCNC: 0.3 MG/DL (ref 0.1–1)
BSA FOR ECHO PROCEDURE: 1.94 M2
BUN SERPL-MCNC: 36 MG/DL (ref 8–23)
CALCIUM SERPL-MCNC: 10 MG/DL (ref 8.7–10.5)
CHLORIDE SERPL-SCNC: 104 MMOL/L (ref 95–110)
CO2 SERPL-SCNC: 24 MMOL/L (ref 23–29)
CREAT SERPL-MCNC: 1.6 MG/DL (ref 0.5–1.4)
CV ECHO LV RWT: 0.83 CM
DIFFERENTIAL METHOD: ABNORMAL
DOP CALC AO PEAK VEL: 1.32 M/S
DOP CALC AO VTI: 26.1 CM
DOP CALC LVOT AREA: 3.4 CM2
DOP CALC LVOT DIAMETER: 2.08 CM
DOP CALC LVOT PEAK VEL: 1.2 M/S
DOP CALC LVOT STROKE VOLUME: 54 CM3
DOP CALC RVOT PEAK VEL: 0.9 M/S
DOP CALC RVOT VTI: 5.6 CM
DOP CALCLVOT PEAK VEL VTI: 15.9 CM
E WAVE DECELERATION TIME: 208.19 MSEC
E/A RATIO: 3.24
E/E' RATIO: 12.22 M/S
ECHO LV POSTERIOR WALL: 1.38 CM (ref 0.6–1.1)
EJECTION FRACTION: 60 %
EOSINOPHIL # BLD AUTO: 0.2 K/UL (ref 0–0.5)
EOSINOPHIL NFR BLD: 2.7 % (ref 0–8)
ERYTHROCYTE [DISTWIDTH] IN BLOOD BY AUTOMATED COUNT: 12.1 % (ref 11.5–14.5)
EST. GFR  (AFRICAN AMERICAN): 33 ML/MIN/1.73 M^2
EST. GFR  (NON AFRICAN AMERICAN): 28 ML/MIN/1.73 M^2
FRACTIONAL SHORTENING: 27 % (ref 28–44)
GLUCOSE SERPL-MCNC: 118 MG/DL (ref 70–110)
HCT VFR BLD AUTO: 40.3 % (ref 37–48.5)
HGB BLD-MCNC: 13.1 G/DL (ref 12–16)
IMM GRANULOCYTES # BLD AUTO: 0.02 K/UL (ref 0–0.04)
IMM GRANULOCYTES NFR BLD AUTO: 0.3 % (ref 0–0.5)
INTERVENTRICULAR SEPTUM: 1.55 CM (ref 0.6–1.1)
IVC DIAMETER: 1.35 CM
IVRT: 71.36 MSEC
LA MAJOR: 4.21 CM
LA MINOR: 4.11 CM
LEFT ATRIUM SIZE: 3.11 CM
LEFT INTERNAL DIMENSION IN SYSTOLE: 2.42 CM (ref 2.1–4)
LEFT VENTRICLE DIASTOLIC VOLUME INDEX: 23.52 ML/M2
LEFT VENTRICLE DIASTOLIC VOLUME: 44.46 ML
LEFT VENTRICLE MASS INDEX: 91 G/M2
LEFT VENTRICLE SYSTOLIC VOLUME INDEX: 10.9 ML/M2
LEFT VENTRICLE SYSTOLIC VOLUME: 20.57 ML
LEFT VENTRICULAR INTERNAL DIMENSION IN DIASTOLE: 3.31 CM (ref 3.5–6)
LEFT VENTRICULAR MASS: 172.58 G
LV LATERAL E/E' RATIO: 10 M/S
LV SEPTAL E/E' RATIO: 15.71 M/S
LVOT MG: 1.64 MMHG
LVOT MV: 0.63 CM/S
LYMPHOCYTES # BLD AUTO: 2.6 K/UL (ref 1–4.8)
LYMPHOCYTES NFR BLD: 32.7 % (ref 18–48)
MAGNESIUM SERPL-MCNC: 1.8 MG/DL (ref 1.6–2.6)
MCH RBC QN AUTO: 31.6 PG (ref 27–31)
MCHC RBC AUTO-ENTMCNC: 32.5 G/DL (ref 32–36)
MCV RBC AUTO: 97 FL (ref 82–98)
MONOCYTES # BLD AUTO: 1 K/UL (ref 0.3–1)
MONOCYTES NFR BLD: 12.9 % (ref 4–15)
MV PEAK A VEL: 0.34 M/S
MV PEAK E VEL: 1.1 M/S
MV STENOSIS PRESSURE HALF TIME: 60.38 MS
MV VALVE AREA P 1/2 METHOD: 3.64 CM2
NEUTROPHILS # BLD AUTO: 4 K/UL (ref 1.8–7.7)
NEUTROPHILS NFR BLD: 50.6 % (ref 38–73)
NRBC BLD-RTO: 0 /100 WBC
PISA MRMAX VEL: 4.52 M/S
PISA TR MAX VEL: 2.74 M/S
PLATELET # BLD AUTO: 230 K/UL (ref 150–450)
PMV BLD AUTO: 9.7 FL (ref 9.2–12.9)
POCT GLUCOSE: 138 MG/DL (ref 70–110)
POCT GLUCOSE: 141 MG/DL (ref 70–110)
POCT GLUCOSE: 157 MG/DL (ref 70–110)
POTASSIUM SERPL-SCNC: 3.9 MMOL/L (ref 3.5–5.1)
PROT SERPL-MCNC: 7.6 G/DL (ref 6–8.4)
PV MEAN GRADIENT: 1.53 MMHG
RA MAJOR: 3.77 CM
RA WIDTH: 3.26 CM
RBC # BLD AUTO: 4.14 M/UL (ref 4–5.4)
SINUS: 3.25 CM
SODIUM SERPL-SCNC: 143 MMOL/L (ref 136–145)
STJ: 3.12 CM
TDI LATERAL: 0.11 M/S
TDI SEPTAL: 0.07 M/S
TDI: 0.09 M/S
TR MAX PG: 30 MMHG
TRICUSPID ANNULAR PLANE SYSTOLIC EXCURSION: 2.13 CM
TROPONIN I SERPL DL<=0.01 NG/ML-MCNC: 0.09 NG/ML (ref 0–0.03)
WBC # BLD AUTO: 7.89 K/UL (ref 3.9–12.7)

## 2022-07-12 PROCEDURE — 25000003 PHARM REV CODE 250: Mod: HCNC | Performed by: INTERNAL MEDICINE

## 2022-07-12 PROCEDURE — 84484 ASSAY OF TROPONIN QUANT: CPT | Mod: HCNC | Performed by: STUDENT IN AN ORGANIZED HEALTH CARE EDUCATION/TRAINING PROGRAM

## 2022-07-12 PROCEDURE — 99222 PR INITIAL HOSPITAL CARE,LEVL II: ICD-10-PCS | Mod: HCNC,25,, | Performed by: INTERNAL MEDICINE

## 2022-07-12 PROCEDURE — 83735 ASSAY OF MAGNESIUM: CPT | Mod: HCNC | Performed by: INTERNAL MEDICINE

## 2022-07-12 PROCEDURE — 80053 COMPREHEN METABOLIC PANEL: CPT | Mod: HCNC | Performed by: INTERNAL MEDICINE

## 2022-07-12 PROCEDURE — 99291 CRITICAL CARE FIRST HOUR: CPT | Mod: HCNC,,, | Performed by: INTERNAL MEDICINE

## 2022-07-12 PROCEDURE — 85025 COMPLETE CBC W/AUTO DIFF WBC: CPT | Mod: HCNC | Performed by: INTERNAL MEDICINE

## 2022-07-12 PROCEDURE — 25000003 PHARM REV CODE 250: Mod: HCNC | Performed by: FAMILY MEDICINE

## 2022-07-12 PROCEDURE — 99222 1ST HOSP IP/OBS MODERATE 55: CPT | Mod: HCNC,25,, | Performed by: INTERNAL MEDICINE

## 2022-07-12 PROCEDURE — 20000000 HC ICU ROOM: Mod: HCNC

## 2022-07-12 PROCEDURE — 36415 COLL VENOUS BLD VENIPUNCTURE: CPT | Mod: HCNC | Performed by: INTERNAL MEDICINE

## 2022-07-12 PROCEDURE — 63600175 PHARM REV CODE 636 W HCPCS: Mod: HCNC | Performed by: FAMILY MEDICINE

## 2022-07-12 PROCEDURE — 36415 COLL VENOUS BLD VENIPUNCTURE: CPT | Mod: HCNC | Performed by: STUDENT IN AN ORGANIZED HEALTH CARE EDUCATION/TRAINING PROGRAM

## 2022-07-12 PROCEDURE — 25000003 PHARM REV CODE 250: Mod: HCNC | Performed by: SURGERY

## 2022-07-12 PROCEDURE — 25000003 PHARM REV CODE 250: Mod: HCNC | Performed by: STUDENT IN AN ORGANIZED HEALTH CARE EDUCATION/TRAINING PROGRAM

## 2022-07-12 PROCEDURE — 99291 PR CRITICAL CARE, E/M 30-74 MINUTES: ICD-10-PCS | Mod: HCNC,,, | Performed by: INTERNAL MEDICINE

## 2022-07-12 RX ORDER — INSULIN ASPART 100 [IU]/ML
1-10 INJECTION, SOLUTION INTRAVENOUS; SUBCUTANEOUS
Status: DISCONTINUED | OUTPATIENT
Start: 2022-07-12 | End: 2022-07-14 | Stop reason: HOSPADM

## 2022-07-12 RX ORDER — METOPROLOL TARTRATE 50 MG/1
50 TABLET ORAL ONCE
Status: COMPLETED | OUTPATIENT
Start: 2022-07-12 | End: 2022-07-12

## 2022-07-12 RX ORDER — METOPROLOL TARTRATE 25 MG/1
25 TABLET, FILM COATED ORAL 2 TIMES DAILY
Status: DISCONTINUED | OUTPATIENT
Start: 2022-07-13 | End: 2022-07-14 | Stop reason: HOSPADM

## 2022-07-12 RX ORDER — IBUPROFEN 200 MG
16 TABLET ORAL
Status: DISCONTINUED | OUTPATIENT
Start: 2022-07-12 | End: 2022-07-14 | Stop reason: HOSPADM

## 2022-07-12 RX ORDER — MUPIROCIN 20 MG/G
OINTMENT TOPICAL 2 TIMES DAILY
Status: DISCONTINUED | OUTPATIENT
Start: 2022-07-12 | End: 2022-07-14 | Stop reason: HOSPADM

## 2022-07-12 RX ORDER — AMOXICILLIN 250 MG
2 CAPSULE ORAL DAILY
Status: DISCONTINUED | OUTPATIENT
Start: 2022-07-12 | End: 2022-07-14 | Stop reason: HOSPADM

## 2022-07-12 RX ORDER — FAMOTIDINE 20 MG/1
20 TABLET, FILM COATED ORAL DAILY
Status: DISCONTINUED | OUTPATIENT
Start: 2022-07-12 | End: 2022-07-14 | Stop reason: HOSPADM

## 2022-07-12 RX ORDER — IBUPROFEN 200 MG
24 TABLET ORAL
Status: DISCONTINUED | OUTPATIENT
Start: 2022-07-12 | End: 2022-07-14 | Stop reason: HOSPADM

## 2022-07-12 RX ORDER — FAMOTIDINE 20 MG/1
20 TABLET, FILM COATED ORAL 2 TIMES DAILY
Status: DISCONTINUED | OUTPATIENT
Start: 2022-07-12 | End: 2022-07-12

## 2022-07-12 RX ORDER — GLUCAGON 1 MG
1 KIT INJECTION
Status: DISCONTINUED | OUTPATIENT
Start: 2022-07-12 | End: 2022-07-14 | Stop reason: HOSPADM

## 2022-07-12 RX ADMIN — FAMOTIDINE 20 MG: 20 TABLET ORAL at 09:07

## 2022-07-12 RX ADMIN — POLYETHYLENE GLYCOL 3350 17 G: 17 POWDER, FOR SOLUTION ORAL at 08:07

## 2022-07-12 RX ADMIN — ACETAMINOPHEN 650 MG: 325 TABLET ORAL at 02:07

## 2022-07-12 RX ADMIN — MUPIROCIN: 20 OINTMENT TOPICAL at 09:07

## 2022-07-12 RX ADMIN — APIXABAN 2.5 MG: 2.5 TABLET, FILM COATED ORAL at 09:07

## 2022-07-12 RX ADMIN — Medication 5 MG/HR: at 05:07

## 2022-07-12 RX ADMIN — APIXABAN 2.5 MG: 2.5 TABLET, FILM COATED ORAL at 08:07

## 2022-07-12 RX ADMIN — POLYETHYLENE GLYCOL 3350 17 G: 17 POWDER, FOR SOLUTION ORAL at 09:07

## 2022-07-12 RX ADMIN — MUPIROCIN: 20 OINTMENT TOPICAL at 08:07

## 2022-07-12 RX ADMIN — ACETAMINOPHEN 650 MG: 325 TABLET ORAL at 08:07

## 2022-07-12 RX ADMIN — AMIODARONE HYDROCHLORIDE 0.5 MG/MIN: 1.8 INJECTION, SOLUTION INTRAVENOUS at 03:07

## 2022-07-12 RX ADMIN — METOPROLOL TARTRATE 50 MG: 50 TABLET, FILM COATED ORAL at 10:07

## 2022-07-12 RX ADMIN — SENNOSIDES AND DOCUSATE SODIUM 2 TABLET: 50; 8.6 TABLET ORAL at 09:07

## 2022-07-12 RX ADMIN — POLYETHYLENE GLYCOL 3350 17 G: 17 POWDER, FOR SOLUTION ORAL at 12:07

## 2022-07-12 RX ADMIN — AMIODARONE HYDROCHLORIDE 0.5 MG/MIN: 1.8 INJECTION, SOLUTION INTRAVENOUS at 04:07

## 2022-07-12 RX ADMIN — AMIODARONE HYDROCHLORIDE 1 MG/MIN: 1.8 INJECTION, SOLUTION INTRAVENOUS at 12:07

## 2022-07-12 NOTE — ASSESSMENT & PLAN NOTE
Has not responded to diltiazem or amiodarone.  Has been intolerant to BBs in the past  Dig likely not a good choice in setting of CKD.    May need cardioversion.  Not unstable at this time.  Echo obtained with report pending.  Trend troponins; though no CP.  Cardiology consult pending.

## 2022-07-12 NOTE — ASSESSMENT & PLAN NOTE
89-year-old  female with PMH significant for atrial fibrillation on apixaban power, diastolic CHF, HTN, was sent to the ED by her PCP for atrial fibrillation with RVR.  Patient has been complaining of palpitations, fatigue. Pt started initially on IV cardizem but dc and is on IV amiodarone.  Pt with hx of cardioversions x 2    Restart Iv cardizem, continue elquis  If does not respond DCCV/cardioversion

## 2022-07-12 NOTE — CONSULTS
[facility] (Hospital)  Cardiology  Consult Note    Patient Name: Shirley Metz  MRN: [id number]  Admission Date: 7/11/2022  Hospital Length of Stay: 1 days  Code Status: Full Code   Attending Provider: Nasrin Christiansen MD   Consulting Provider: Rafa Samson MD  Primary Care Physician: Heather Miller NP  Principal Problem:Atrial fibrillation with RVR    Patient information was obtained from patient and ER records.     Inpatient consult to Cardiology  Consult performed by: Rafa Samson MD  Consult ordered by: Sebastián Irby MD        Subjective:     Chief Complaint:  palpitations     HPI:   89-year-old  female with PMH significant for atrial fibrillation on apixaban power, diastolic CHF, HTN, was sent to the ED by her PCP for atrial fibrillation with RVR.  Patient has been complaining of palpitations, fatigue. Pt started initially on IV cardizem but dc and is on IV amiodarone.  Pt with hx of cardioversions x 2      Past Medical History:   Diagnosis Date    Anemia 11/29/2018    Anxiety 11/28/2017    Arthritis     Atrial fibrillation with RVR 10/9/2019    Back pain     Basal cell carcinoma 03/29/2018    left mid back    Chronic diastolic congestive heart failure 10/15/2019    CKD (chronic kidney disease) stage 3, GFR 30-59 ml/min 8/8/2016    Colon polyps     reported per patient.     Coronary artery calcification 10/5/2021    Fuchs' corneal dystrophy     General anesthetics causing adverse effect in therapeutic use     woke up during surgery and gagged on ET tube    Glaucoma     Glaucoma     Heart failure with preserved left ventricular function (HFpEF) 7/24/2018    Hyperlipidemia     Hypertension     Macular degeneration dry    Obesity     PVD (peripheral vascular disease) 4/26/2021    PVD (peripheral vascular disease) 4/26/2021    Squamous cell carcinoma 01/08/2019    left shoulder    Stenosis of carotid artery 10/5/2021    Trouble in sleeping      Type 2 diabetes mellitus without complication, without long-term current use of insulin 2/24/2021    Urinary tract infection        Past Surgical History:   Procedure Laterality Date    ADENOIDECTOMY  1938    BREAST BIOPSY Left     1997. benign    BREAST CYST EXCISION Left 1997 approx    CARPAL TUNNEL RELEASE Right 2012 approx    CATARACT EXTRACTION Bilateral 1994    CHOLECYSTECTOMY  1975    CORNEAL TRANSPLANT Bilateral 08/2015 8/2015 - left; Right 4/2016    EYE SURGERY      Fuch's Corneal dystrophy - had 2nd operation with Dr. Quintanilla    EYE SURGERY      Cataract wIOL    left thumb Left 2011    removed cuboid for arthritis    REVERSE TOTAL SHOULDER ARTHROPLASTY Left 8/21/2018    Procedure: ARTHROPLASTY, SHOULDER, TOTAL, REVERSE;  Surgeon: Solomon Lujan MD;  Location: [facility];  Service: Orthopedics;  Laterality: Left;  WITH BICEP TENODESIS    SKIN CANCER EXCISION Left 2017    BCC removal by Dr. Valadez    SLT- OS (aka TRABECULOPLASTY) Left 05/11/2011    repeat 3/14/12    TENOTOMY Left 8/21/2018    Procedure: TENOTOMY;  Surgeon: Solomon Lujan MD;  Location: [facility] OR;  Service: Orthopedics;  Laterality: Left;    TONSILLECTOMY  1938    TREATMENT OF CARDIAC ARRHYTHMIA N/A 10/10/2019    Procedure: CARDIOVERSION;  Surgeon: Pete Durán MD;  Location: [facility] CATH LAB;  Service: Cardiology;  Laterality: N/A;    TREATMENT OF CARDIAC ARRHYTHMIA N/A 12/6/2019    Procedure: CARDIOVERSION;  Surgeon: Samy Castillo MD;  Location: [facility] CATH LAB;  Service: Cardiology;  Laterality: N/A;       Review of patient's allergies indicates:   Allergen Reactions    Carvedilol Swelling     lips  lips  Other reaction(s): swelling    Cephalexin Other (See Comments)     Muscle/joint weakness unable to move     Doxycycline calcium Other (See Comments)     Weakness nausea   sob  Other reaction(s): weakness, nausea, SOB    Erythromycin Other (See Comments)     Complete weakness/could not walk    Gadolinium-containing  contrast media Other (See Comments)     angioedema  Other reaction(s): Angioedema    Hydrocodone-acetaminophen      wheezing  wheezing  wheezing  Other reaction(s): wheezing    Inveltys [loteprednol etabonate] Palpitations and Other (See Comments)     Patient stated bp went up.    Labetalol Shortness Of Breath, Nausea Only and Other (See Comments)     Palpitations, LE weakness  Other reaction(s): SOB, palpitations, weakness    Loratadine Other (See Comments)     Double vision/spots  Other reaction(s): double vision    Macrolide antibiotics Other (See Comments)     Complete weakness/could not walk  Complete weakness/could not walk  Complete weakness/could not walk  Complete weakness/could not walk  Other reaction(s): complete weakness    Moxifloxacin Other (See Comments)     Hives   muscle soreness  Other reaction(s): hives, muscle soreness    Naproxen      wheezing  wheezing  wheezing  Other reaction(s): wheezing    Statins-hmg-coa reductase inhibitors Other (See Comments)     Severe Muscle weakness  Muscle weakness  Severe Muscle weakness  Other reaction(s): severe muscle weakness    Timolol Other (See Comments)     Severe muscle weakness, eye problems.  Other reaction(s): severe muscle weakness    Verapamil Diarrhea     Severe diarrhea.  Other reaction(s): diarrhea    Amlodipine      Myalgias    Other reaction(s): myalgian    Doxycycline     Fenofibrate      Causes muscle weakness  Other reaction(s): muscle weakness    Hydralazine      Other reaction(s): muscle tremors    Hydralazine analogues      Muscle tremors/aches      Hydrocortisone     Isosorbide      Tolerates Imdur    Loteprednol      Other reaction(s): increased BP    Tetanus and diphtheria toxoids     Adhesive Rash     Clonidine patch - 2 mfg - contact dermatitis    Codeine Diarrhea and Other (See Comments)     Loss of balance   Other reaction(s): loss of balance    Doxazosin Palpitations     Other reaction(s): palpitations     Fexofenadine Other (See Comments)     Double vision/spots   Other reaction(s): double vision    Hydrocortisone (bulk) Other (See Comments)     Only suppository/ caused muscle weakness    Latanoprost Other (See Comments)     Muscle weakness  Other reaction(s): muscle weakness    Olopatadine Other (See Comments)     Muscle weakness  Other reaction(s): muscle weakness    Prednisone Anxiety       Current Facility-Administered Medications on File Prior to Encounter   Medication    sodium chloride 0.9% flush 3 mL     Current Outpatient Medications on File Prior to Encounter   Medication Sig    acetaminophen (TYLENOL) 500 MG tablet Take 500 mg by mouth daily as needed. Taking 1 to 3    ALPRAZolam (XANAX) 0.5 MG tablet Take 1 tablet (0.5 mg total) by mouth nightly as needed for Anxiety.    apixaban (ELIQUIS) 2.5 mg Tab Take 1 tablet (2.5 mg total) by mouth 2 (two) times daily.    b complex vitamins capsule Take 1 capsule by mouth once daily.    cholecalciferol, vitamin D3, (VITAMIN D3) 50 mcg (2,000 unit) Cap Take 1 capsule by mouth once daily.    coenzyme Q10 200 mg capsule Take 400 mg by mouth once daily.     fish oil-omega-3 fatty acids 300-1,000 mg capsule Take 2 g by mouth 2 (two) times daily.     furosemide (LASIX) 40 MG tablet Take 1 tablet (40 mg total) by mouth daily as needed (edema, swelling, fluid).    hydroCHLOROthiazide (HYDRODIURIL) 12.5 MG Tab Take 1 tablet (12.5 mg total) by mouth once daily.    isosorbide mononitrate (IMDUR) 30 MG 24 hr tablet Take 1 tablet (30 mg total) by mouth every evening.    losartan (COZAAR) 25 MG tablet Take 1 tablet (25 mg total) by mouth 2 (two) times daily.    metoprolol tartrate (LOPRESSOR) 25 MG tablet TAKE 1/2 TABLET BY MOUTH TWICE DAILY    netarsudiL (RHOPRESSA) 0.02 % ophthalmic solution Place 1 drop into the left eye every evening.    POLYETHYLENE GLYCOL 3350 (MIRALAX ORAL) Take 17 g by mouth 2 (two) times a day. Taking BID    travoprost (TRAVATAN Z)  0.004 % ophthalmic solution PLACE 1 DROP INTO THE LEFT EYE EVERY EVENING.    cloNIDine (CATAPRES) 0.1 MG tablet Take 1 tablet (0.1 mg total) by mouth 2 (two) times daily as needed (systolic BP over 180).    loteprednol (LOTEMAX) 0.5 % ophthalmic suspension Place 1 drop into the right eye once daily. Right eye only (Patient taking differently: Place 1 drop into the right eye once daily. Right eye only)    mupirocin (BACTROBAN) 2 % ointment apply to affected area twice a day with Q-tip. Antibiotic ointment.     Family History       Problem Relation (Age of Onset)    Cancer Father (88)    Heart disease Mother    Hypertension Mother          Tobacco Use    Smoking status: Never Smoker    Smokeless tobacco: Never Used    Tobacco comment: history of passive smoking from her ex-   Substance and Sexual Activity    Alcohol use: Yes     Alcohol/week: 2.0 standard drinks     Types: 2 Standard drinks or equivalent per week     Comment: occ    Drug use: No    Sexual activity: Not Currently     Partners: Male     Birth control/protection: Post-menopausal     Comment: discussed protection for STD's     Review of Systems   Constitutional: Negative. Negative for weight gain.   HENT: Negative.     Eyes: Negative.    Cardiovascular: Negative.  Negative for chest pain, leg swelling and palpitations.   Respiratory: Negative.  Negative for shortness of breath.    Endocrine: Negative.    Hematologic/Lymphatic: Negative.    Skin: Negative.    Musculoskeletal:  Negative for muscle weakness.   Gastrointestinal: Negative.    Genitourinary: Negative.    Neurological: Negative.  Negative for dizziness.   Psychiatric/Behavioral: Negative.     Allergic/Immunologic: Negative.    Objective:     Vital Signs (Most Recent):  Temp: 97.9 °F (36.6 °C) (07/12/22 1515)  Pulse: (Abnormal) 134 (07/12/22 1630)  Resp: (Abnormal) 24 (07/12/22 1630)  BP: (Abnormal) 144/82 (07/12/22 1630)  SpO2: 96 % (07/12/22 1630)   Vital Signs (24h  Range):  Temp:  [97.5 °F (36.4 °C)-98.6 °F (37 °C)] 97.9 °F (36.6 °C)  Pulse:  [] 134  Resp:  [18-42] 24  SpO2:  [95 %-98 %] 96 %  BP: (109-182)/() 144/82     Weight: 83.5 kg (184 lb)  Body mass index is 31.58 kg/m².    SpO2: 96 %  O2 Device (Oxygen Therapy): room air      Intake/Output Summary (Last 24 hours) at 7/12/2022 1658  Last data filed at 7/12/2022 1600  Gross per 24 hour   Intake 1237.49 ml   Output 1 ml   Net 1236.49 ml       Lines/Drains/Airways       Peripheral Intravenous Line       Name Duration         Peripheral IV - Single Lumen 07/11/22 1839 20 G Right Hand <1 day         Peripheral IV - Single Lumen 07/11/22 2108 22 G Left Hand <1 day                    Physical Exam  Vitals and nursing note reviewed.   Constitutional:       Appearance: She is well-developed.   HENT:      Head: Normocephalic and atraumatic.   Eyes:      Conjunctiva/sclera: Conjunctivae normal.      Pupils: Pupils are equal, round, and reactive to light.   Cardiovascular:      Rate and Rhythm: Regular rhythm. Tachycardia present.      Pulses: Intact distal pulses.      Heart sounds: Normal heart sounds.   Pulmonary:      Effort: Pulmonary effort is normal.      Breath sounds: Normal breath sounds.   Abdominal:      General: Bowel sounds are normal.      Palpations: Abdomen is soft.   Musculoskeletal:         General: Normal range of motion.      Cervical back: Normal range of motion and neck supple.   Skin:     General: Skin is warm and dry.   Neurological:      Mental Status: She is alert and oriented to person, place, and time.       Significant Labs: All pertinent lab results from the last 24 hours have been reviewed.    Significant Imaging: X-Ray: CXR: X-Ray Chest PA and Lateral (CXR): No results found for this visit on 07/11/22.    Assessment and Plan:     * Atrial fibrillation with RVR  89-year-old  female with PMH significant for atrial fibrillation on apixaban power, diastolic CHF, HTN, was sent to the  ED by her PCP for atrial fibrillation with RVR.  Patient has been complaining of palpitations, fatigue. Pt started initially on IV cardizem but dc and is on IV amiodarone.  Pt with hx of cardioversions x 2    Restart Iv cardizem, continue elquis  If does not respond DCCV/cardioversion        VTE Risk Mitigation (From admission, onward)         Ordered     apixaban tablet 2.5 mg  2 times daily         07/11/22 2131     Place sequential compression device  Until discontinued         07/11/22 2131                Thank you for your consult. I will follow-up with patient. Please contact us if you have any additional questions.    Rafa Samson MD  Cardiology   [facility] ([facility])

## 2022-07-12 NOTE — ASSESSMENT & PLAN NOTE
-HR in the 150s.  -diltiazem 10 mg IV x2 doses.  -currently on diltiazem drip, titrating.  -started on amiodarone  -admit to ICU.  -cardiology consult.  -continue home dose apixaban.

## 2022-07-12 NOTE — ASSESSMENT & PLAN NOTE
-HR in the 150s.  -diltiazem 10 mg IV x2 doses.  -currently on diltiazem drip, titrating.  -consider amiodarone drip if no improvement.  -admit to ICU.  -cardiology consult.  -continue home dose apixaban.

## 2022-07-12 NOTE — HOSPITAL COURSE
7/12:  Afib RVR. On Amio.  Diltiazem added in afternoon.  Cardiology consulting.  7/13: Remains in afib.  Metoprolol PO added last night.  Also, remained on amio and dilt drips.  HR controlled.  Converted to PO meds.

## 2022-07-12 NOTE — SUBJECTIVE & OBJECTIVE
Past Medical History:   Diagnosis Date    Anemia 11/29/2018    Anxiety 11/28/2017    Arthritis     Atrial fibrillation with RVR 10/9/2019    Back pain     Basal cell carcinoma 03/29/2018    left mid back    Chronic diastolic congestive heart failure 10/15/2019    CKD (chronic kidney disease) stage 3, GFR 30-59 ml/min 8/8/2016    Colon polyps     reported per patient.     Coronary artery calcification 10/5/2021    Fuchs' corneal dystrophy     General anesthetics causing adverse effect in therapeutic use     woke up during surgery and gagged on ET tube    Glaucoma     Glaucoma     Heart failure with preserved left ventricular function (HFpEF) 7/24/2018    Hyperlipidemia     Hypertension     Macular degeneration dry    Obesity     PVD (peripheral vascular disease) 4/26/2021    PVD (peripheral vascular disease) 4/26/2021    Squamous cell carcinoma 01/08/2019    left shoulder    Stenosis of carotid artery 10/5/2021    Trouble in sleeping     Type 2 diabetes mellitus without complication, without long-term current use of insulin 2/24/2021    Urinary tract infection        Past Surgical History:   Procedure Laterality Date    ADENOIDECTOMY  1938    BREAST BIOPSY Left     1997. benign    BREAST CYST EXCISION Left 1997 approx    CARPAL TUNNEL RELEASE Right 2012 approx    CATARACT EXTRACTION Bilateral 1994    CHOLECYSTECTOMY  1975    CORNEAL TRANSPLANT Bilateral 08/2015 8/2015 - left; Right 4/2016    EYE SURGERY      Fuch's Corneal dystrophy - had 2nd operation with Dr. Quintanilla    EYE SURGERY      Cataract wIOL    left thumb Left 2011    removed cuboid for arthritis    REVERSE TOTAL SHOULDER ARTHROPLASTY Left 8/21/2018    Procedure: ARTHROPLASTY, SHOULDER, TOTAL, REVERSE;  Surgeon: Solomon Lujan MD;  Location: [facility];  Service: Orthopedics;  Laterality: Left;  WITH BICEP TENODESIS    SKIN CANCER EXCISION Left 2017    BCC removal by Dr. Valadez    SLT- OS (aka TRABECULOPLASTY) Left 05/11/2011    repeat 3/14/12     TENOTOMY Left 8/21/2018    Procedure: TENOTOMY;  Surgeon: Solomon Lujan MD;  Location: [facility] OR;  Service: Orthopedics;  Laterality: Left;    TONSILLECTOMY  1938    TREATMENT OF CARDIAC ARRHYTHMIA N/A 10/10/2019    Procedure: CARDIOVERSION;  Surgeon: Pete Durán MD;  Location: [facility] CATH LAB;  Service: Cardiology;  Laterality: N/A;    TREATMENT OF CARDIAC ARRHYTHMIA N/A 12/6/2019    Procedure: CARDIOVERSION;  Surgeon: Samy Castillo MD;  Location: [facility] CATH LAB;  Service: Cardiology;  Laterality: N/A;       Review of patient's allergies indicates:   Allergen Reactions    Carvedilol Swelling     lips  lips  Other reaction(s): swelling    Cephalexin Other (See Comments)     Muscle/joint weakness unable to move     Doxycycline calcium Other (See Comments)     Weakness nausea   sob  Other reaction(s): weakness, nausea, SOB    Erythromycin Other (See Comments)     Complete weakness/could not walk    Gadolinium-containing contrast media Other (See Comments)     angioedema  Other reaction(s): Angioedema    Hydrocodone-acetaminophen      wheezing  wheezing  wheezing  Other reaction(s): wheezing    Inveltys [loteprednol etabonate] Palpitations and Other (See Comments)     Patient stated bp went up.    Labetalol Shortness Of Breath, Nausea Only and Other (See Comments)     Palpitations, LE weakness  Other reaction(s): SOB, palpitations, weakness    Loratadine Other (See Comments)     Double vision/spots  Other reaction(s): double vision    Macrolide antibiotics Other (See Comments)     Complete weakness/could not walk  Complete weakness/could not walk  Complete weakness/could not walk  Complete weakness/could not walk  Other reaction(s): complete weakness    Moxifloxacin Other (See Comments)     Hives   muscle soreness  Other reaction(s): hives, muscle soreness    Naproxen      wheezing  wheezing  wheezing  Other reaction(s): wheezing    Statins-hmg-coa reductase inhibitors Other (See Comments)     Severe Muscle  weakness  Muscle weakness  Severe Muscle weakness  Other reaction(s): severe muscle weakness    Timolol Other (See Comments)     Severe muscle weakness, eye problems.  Other reaction(s): severe muscle weakness    Verapamil Diarrhea     Severe diarrhea.  Other reaction(s): diarrhea    Amlodipine      Myalgias    Other reaction(s): myalgian    Doxycycline     Fenofibrate      Causes muscle weakness  Other reaction(s): muscle weakness    Hydralazine      Other reaction(s): muscle tremors    Hydralazine analogues      Muscle tremors/aches      Hydrocortisone     Isosorbide      Tolerates Imdur    Loteprednol      Other reaction(s): increased BP    Tetanus and diphtheria toxoids     Adhesive Rash     Clonidine patch - 2 mfg - contact dermatitis    Codeine Diarrhea and Other (See Comments)     Loss of balance   Other reaction(s): loss of balance    Doxazosin Palpitations     Other reaction(s): palpitations    Fexofenadine Other (See Comments)     Double vision/spots   Other reaction(s): double vision    Hydrocortisone (bulk) Other (See Comments)     Only suppository/ caused muscle weakness    Latanoprost Other (See Comments)     Muscle weakness  Other reaction(s): muscle weakness    Olopatadine Other (See Comments)     Muscle weakness  Other reaction(s): muscle weakness    Prednisone Anxiety       Current Facility-Administered Medications on File Prior to Encounter   Medication    sodium chloride 0.9% flush 3 mL     Current Outpatient Medications on File Prior to Encounter   Medication Sig    acetaminophen (TYLENOL) 500 MG tablet Take 500 mg by mouth daily as needed. Taking 1 to 3    ALPRAZolam (XANAX) 0.5 MG tablet Take 1 tablet (0.5 mg total) by mouth nightly as needed for Anxiety.    apixaban (ELIQUIS) 2.5 mg Tab Take 1 tablet (2.5 mg total) by mouth 2 (two) times daily.    b complex vitamins capsule Take 1 capsule by mouth once daily.    cholecalciferol, vitamin D3, (VITAMIN D3) 50 mcg (2,000 unit) Cap Take 1 capsule  by mouth once daily.    coenzyme Q10 200 mg capsule Take 400 mg by mouth once daily.     fish oil-omega-3 fatty acids 300-1,000 mg capsule Take 2 g by mouth 2 (two) times daily.     furosemide (LASIX) 40 MG tablet Take 1 tablet (40 mg total) by mouth daily as needed (edema, swelling, fluid).    hydroCHLOROthiazide (HYDRODIURIL) 12.5 MG Tab Take 1 tablet (12.5 mg total) by mouth once daily.    isosorbide mononitrate (IMDUR) 30 MG 24 hr tablet Take 1 tablet (30 mg total) by mouth every evening.    losartan (COZAAR) 25 MG tablet Take 1 tablet (25 mg total) by mouth 2 (two) times daily.    metoprolol tartrate (LOPRESSOR) 25 MG tablet TAKE 1/2 TABLET BY MOUTH TWICE DAILY    netarsudiL (RHOPRESSA) 0.02 % ophthalmic solution Place 1 drop into the left eye every evening.    POLYETHYLENE GLYCOL 3350 (MIRALAX ORAL) Take 17 g by mouth 2 (two) times a day. Taking BID    travoprost (TRAVATAN Z) 0.004 % ophthalmic solution PLACE 1 DROP INTO THE LEFT EYE EVERY EVENING.    cloNIDine (CATAPRES) 0.1 MG tablet Take 1 tablet (0.1 mg total) by mouth 2 (two) times daily as needed (systolic BP over 180).    loteprednol (LOTEMAX) 0.5 % ophthalmic suspension Place 1 drop into the right eye once daily. Right eye only (Patient taking differently: Place 1 drop into the right eye once daily. Right eye only)    mupirocin (BACTROBAN) 2 % ointment apply to affected area twice a day with Q-tip. Antibiotic ointment.     Family History       Problem Relation (Age of Onset)    Cancer Father (88)    Heart disease Mother    Hypertension Mother          Tobacco Use    Smoking status: Never Smoker    Smokeless tobacco: Never Used    Tobacco comment: history of passive smoking from her ex-   Substance and Sexual Activity    Alcohol use: Yes     Alcohol/week: 2.0 standard drinks     Types: 2 Standard drinks or equivalent per week     Comment: occ    Drug use: No    Sexual activity: Not Currently     Partners: Male     Birth control/protection:  Post-menopausal     Comment: discussed protection for STD's     Review of Systems   Constitutional: Negative. Negative for weight gain.   HENT: Negative.     Eyes: Negative.    Cardiovascular: Negative.  Negative for chest pain, leg swelling and palpitations.   Respiratory: Negative.  Negative for shortness of breath.    Endocrine: Negative.    Hematologic/Lymphatic: Negative.    Skin: Negative.    Musculoskeletal:  Negative for muscle weakness.   Gastrointestinal: Negative.    Genitourinary: Negative.    Neurological: Negative.  Negative for dizziness.   Psychiatric/Behavioral: Negative.     Allergic/Immunologic: Negative.    Objective:     Vital Signs (Most Recent):  Temp: 97.9 °F (36.6 °C) (07/12/22 1515)  Pulse: (Abnormal) 134 (07/12/22 1630)  Resp: (Abnormal) 24 (07/12/22 1630)  BP: (Abnormal) 144/82 (07/12/22 1630)  SpO2: 96 % (07/12/22 1630)   Vital Signs (24h Range):  Temp:  [97.5 °F (36.4 °C)-98.6 °F (37 °C)] 97.9 °F (36.6 °C)  Pulse:  [] 134  Resp:  [18-42] 24  SpO2:  [95 %-98 %] 96 %  BP: (109-182)/() 144/82     Weight: 83.5 kg (184 lb)  Body mass index is 31.58 kg/m².    SpO2: 96 %  O2 Device (Oxygen Therapy): room air      Intake/Output Summary (Last 24 hours) at 7/12/2022 1658  Last data filed at 7/12/2022 1600  Gross per 24 hour   Intake 1237.49 ml   Output 1 ml   Net 1236.49 ml       Lines/Drains/Airways       Peripheral Intravenous Line       Name Duration         Peripheral IV - Single Lumen 07/11/22 1839 20 G Right Hand <1 day         Peripheral IV - Single Lumen 07/11/22 2108 22 G Left Hand <1 day                    Physical Exam  Vitals and nursing note reviewed.   Constitutional:       Appearance: She is well-developed.   HENT:      Head: Normocephalic and atraumatic.   Eyes:      Conjunctiva/sclera: Conjunctivae normal.      Pupils: Pupils are equal, round, and reactive to light.   Cardiovascular:      Rate and Rhythm: Regular rhythm. Tachycardia present.      Pulses: Intact distal  pulses.      Heart sounds: Normal heart sounds.   Pulmonary:      Effort: Pulmonary effort is normal.      Breath sounds: Normal breath sounds.   Abdominal:      General: Bowel sounds are normal.      Palpations: Abdomen is soft.   Musculoskeletal:         General: Normal range of motion.      Cervical back: Normal range of motion and neck supple.   Skin:     General: Skin is warm and dry.   Neurological:      Mental Status: She is alert and oriented to person, place, and time.       Significant Labs: All pertinent lab results from the last 24 hours have been reviewed.    Significant Imaging: X-Ray: CXR: X-Ray Chest PA and Lateral (CXR): No results found for this visit on 07/11/22.

## 2022-07-12 NOTE — CONSULTS
[facility] - Intensive Care (Hospital)  Critical Care Medicine  Consult Note    Patient Name: Shirley Metz  MRN: [id number]  Admission Date: 7/11/2022  Hospital Length of Stay: 1 days  Code Status: Prior  Attending Physician: Nasrin Christiansen MD   Primary Care Provider: Heather Miller NP   Principal Problem: Atrial fibrillation with RVR    Inpatient consult to Critical Care Medicine  Consult performed by: Alexandru Rios MD  Consult ordered by: Nasrin Christiansen MD        Subjective:     HPI:  89W pmh A- fib on Eliquis, previous cardioversion 2019, chronic HFpEF is admitted with Afib/RVR.    Patient was sent from her PCP clinic with afib RVR.  She states she went to the PCP for elevated HR that was discovered during home HR monitoring.  She was asymptomatic.       In ED she was given diltiazem boluses and drip and did not respond.  She was subsequently started on amiodarone again without response.  She has a history of being intolerant to B-blockers.    She does not have chest pain, SOB, jaw pain, LUE pain, abdominal pain, nausea, vomiting, diarrhea, fevers, sweats, chills.    BNP was 289 and troponin 0.086.   Echo is pending.          Hospital/ICU Course:  7/12:  Afib RVR not responded to diltiazem or amiodarone.  Cardiology consult pending.      Past Medical History:   Diagnosis Date    Anemia 11/29/2018    Anxiety 11/28/2017    Arthritis     Atrial fibrillation with RVR 10/9/2019    Back pain     Basal cell carcinoma 03/29/2018    left mid back    Chronic diastolic congestive heart failure 10/15/2019    CKD (chronic kidney disease) stage 3, GFR 30-59 ml/min 8/8/2016    Colon polyps     reported per patient.     Coronary artery calcification 10/5/2021    Fuchs' corneal dystrophy     General anesthetics causing adverse effect in therapeutic use     woke up during surgery and gagged on ET tube    Glaucoma     Glaucoma     Heart failure with preserved left ventricular function (HFpEF)  7/24/2018    Hyperlipidemia     Hypertension     Macular degeneration dry    Obesity     PVD (peripheral vascular disease) 4/26/2021    PVD (peripheral vascular disease) 4/26/2021    Squamous cell carcinoma 01/08/2019    left shoulder    Stenosis of carotid artery 10/5/2021    Trouble in sleeping     Type 2 diabetes mellitus without complication, without long-term current use of insulin 2/24/2021    Urinary tract infection        Past Surgical History:   Procedure Laterality Date    ADENOIDECTOMY  1938    BREAST BIOPSY Left     1997. benign    BREAST CYST EXCISION Left 1997 approx    CARPAL TUNNEL RELEASE Right 2012 approx    CATARACT EXTRACTION Bilateral 1994    CHOLECYSTECTOMY  1975    CORNEAL TRANSPLANT Bilateral 08/2015 8/2015 - left; Right 4/2016    EYE SURGERY      Fuch's Corneal dystrophy - had 2nd operation with Dr. Quintanilla    EYE SURGERY      Cataract wIOL    left thumb Left 2011    removed cuboid for arthritis    REVERSE TOTAL SHOULDER ARTHROPLASTY Left 8/21/2018    Procedure: ARTHROPLASTY, SHOULDER, TOTAL, REVERSE;  Surgeon: Solomon Lujan MD;  Location: [facility] OR;  Service: Orthopedics;  Laterality: Left;  WITH BICEP TENODESIS    SKIN CANCER EXCISION Left 2017    BCC removal by Dr. Valadez    SLT- OS (aka TRABECULOPLASTY) Left 05/11/2011    repeat 3/14/12    TENOTOMY Left 8/21/2018    Procedure: TENOTOMY;  Surgeon: Solomon Lujan MD;  Location: [facility] OR;  Service: Orthopedics;  Laterality: Left;    TONSILLECTOMY  1938    TREATMENT OF CARDIAC ARRHYTHMIA N/A 10/10/2019    Procedure: CARDIOVERSION;  Surgeon: Pete Durán MD;  Location: [facility] CATH LAB;  Service: Cardiology;  Laterality: N/A;    TREATMENT OF CARDIAC ARRHYTHMIA N/A 12/6/2019    Procedure: CARDIOVERSION;  Surgeon: Samy Castillo MD;  Location: [facility] CATH LAB;  Service: Cardiology;  Laterality: N/A;       Review of patient's allergies indicates:   Allergen Reactions    Carvedilol Swelling     lips  lips  Other  reaction(s): swelling    Cephalexin Other (See Comments)     Muscle/joint weakness unable to move     Doxycycline calcium Other (See Comments)     Weakness nausea   sob  Other reaction(s): weakness, nausea, SOB    Erythromycin Other (See Comments)     Complete weakness/could not walk    Gadolinium-containing contrast media Other (See Comments)     angioedema  Other reaction(s): Angioedema    Hydrocodone-acetaminophen      wheezing  wheezing  wheezing  Other reaction(s): wheezing    Inveltys [loteprednol etabonate] Palpitations and Other (See Comments)     Patient stated bp went up.    Labetalol Shortness Of Breath, Nausea Only and Other (See Comments)     Palpitations, LE weakness  Other reaction(s): SOB, palpitations, weakness    Loratadine Other (See Comments)     Double vision/spots  Other reaction(s): double vision    Macrolide antibiotics Other (See Comments)     Complete weakness/could not walk  Complete weakness/could not walk  Complete weakness/could not walk  Complete weakness/could not walk  Other reaction(s): complete weakness    Moxifloxacin Other (See Comments)     Hives   muscle soreness  Other reaction(s): hives, muscle soreness    Naproxen      wheezing  wheezing  wheezing  Other reaction(s): wheezing    Statins-hmg-coa reductase inhibitors Other (See Comments)     Severe Muscle weakness  Muscle weakness  Severe Muscle weakness  Other reaction(s): severe muscle weakness    Timolol Other (See Comments)     Severe muscle weakness, eye problems.  Other reaction(s): severe muscle weakness    Verapamil Diarrhea     Severe diarrhea.  Other reaction(s): diarrhea    Amlodipine      Myalgias    Other reaction(s): myalgian    Doxycycline     Fenofibrate      Causes muscle weakness  Other reaction(s): muscle weakness    Hydralazine      Other reaction(s): muscle tremors    Hydralazine analogues      Muscle tremors/aches      Hydrocortisone     Isosorbide      Tolerates Imdur    Loteprednol       Other reaction(s): increased BP    Tetanus and diphtheria toxoids     Adhesive Rash     Clonidine patch - 2 mfg - contact dermatitis    Codeine Diarrhea and Other (See Comments)     Loss of balance   Other reaction(s): loss of balance    Doxazosin Palpitations     Other reaction(s): palpitations    Fexofenadine Other (See Comments)     Double vision/spots   Other reaction(s): double vision    Hydrocortisone (bulk) Other (See Comments)     Only suppository/ caused muscle weakness    Latanoprost Other (See Comments)     Muscle weakness  Other reaction(s): muscle weakness    Olopatadine Other (See Comments)     Muscle weakness  Other reaction(s): muscle weakness    Prednisone Anxiety       Family History       Problem Relation (Age of Onset)    Cancer Father (88)    Heart disease Mother    Hypertension Mother          Tobacco Use    Smoking status: Never Smoker    Smokeless tobacco: Never Used    Tobacco comment: history of passive smoking from her ex-   Substance and Sexual Activity    Alcohol use: Yes     Alcohol/week: 2.0 standard drinks     Types: 2 Standard drinks or equivalent per week     Comment: occ    Drug use: No    Sexual activity: Not Currently     Partners: Male     Birth control/protection: Post-menopausal     Comment: discussed protection for STD's         Review of Systems   Constitutional:  Negative for chills, fatigue and fever.   Respiratory:  Negative for cough, shortness of breath and wheezing.    Gastrointestinal:  Negative for abdominal pain, diarrhea, nausea and vomiting.   Neurological:  Negative for seizures, weakness and headaches.   All other systems reviewed and are negative.  Objective:     Vital Signs (Most Recent):  Temp: 98.2 °F (36.8 °C) (07/12/22 0301)  Pulse: (!) 144 (07/12/22 0600)  Resp: 20 (07/12/22 0600)  BP: 124/67 (07/12/22 0600)  SpO2: 97 % (07/12/22 0600)   Vital Signs (24h Range):  Temp:  [98.2 °F (36.8 °C)-98.6 °F (37 °C)] 98.2 °F (36.8  °C)  Pulse:  [] 144  Resp:  [18-29] 20  SpO2:  [95 %-97 %] 97 %  BP: (100-156)/(56-97) 124/67     Weight: 83.6 kg (184 lb 4.9 oz)  Body mass index is 31.64 kg/m².      Intake/Output Summary (Last 24 hours) at 7/12/2022 0812  Last data filed at 7/12/2022 0600  Gross per 24 hour   Intake 471.74 ml   Output --   Net 471.74 ml       Physical Exam  Vitals reviewed.   Constitutional:       Appearance: Normal appearance.   HENT:      Head: Normocephalic and atraumatic.      Nose: Nose normal.   Eyes:      Extraocular Movements: Extraocular movements intact.      Pupils: Pupils are equal, round, and reactive to light.   Cardiovascular:      Rate and Rhythm: Normal rate and regular rhythm.   Pulmonary:      Effort: Pulmonary effort is normal. No respiratory distress.      Comments: Symmetric chest rise.  Abdominal:      General: Abdomen is flat. There is no distension.      Palpations: Abdomen is soft.   Musculoskeletal:         General: No deformity or signs of injury.      Right lower leg: No edema.      Left lower leg: No edema.   Skin:     General: Skin is warm and dry.   Neurological:      General: No focal deficit present.      Mental Status: She is alert and oriented to person, place, and time.       Vents:       Lines/Drains/Airways       Peripheral Intravenous Line  Duration                  Peripheral IV - Single Lumen 07/11/22 1839 20 G Right Hand <1 day         Peripheral IV - Single Lumen 07/11/22 2108 22 G Left Hand <1 day                    Significant Labs:    CBC/Anemia Profile:  Recent Labs   Lab 07/11/22 1842 07/12/22  0405   WBC 8.50 7.89   HGB 12.9 13.1   HCT 38.2 40.3    230   MCV 96 97   RDW 12.3 12.1        Chemistries:  Recent Labs   Lab 07/11/22 1842 07/12/22  0404    143   K 4.2 3.9    104   CO2 25 24   BUN 37* 36*   CREATININE 1.5* 1.6*   CALCIUM 10.0 10.0   ALBUMIN 3.8 3.8   PROT 7.5 7.6   BILITOT 0.3 0.3   ALKPHOS 63 66   ALT 13 13   AST 15 15   MG  --  1.8       A1C:  No results for input(s): HGBA1C in the last 48 hours.  ABGs: No results for input(s): PH, PCO2, HCO3, POCSATURATED, BE in the last 48 hours.  Lactic Acid: No results for input(s): LACTATE in the last 48 hours.  Troponin:   Recent Labs   Lab 07/11/22  1842   TROPONINI 0.086*     Urine Studies:   Recent Labs   Lab 07/11/22  2118   COLORU Yellow   APPEARANCEUA Clear   PHUR 7.0   SPECGRAV 1.015   PROTEINUA Negative   GLUCUA Negative   KETONESU Negative   BILIRUBINUA Negative   OCCULTUA Trace*   NITRITE Negative   UROBILINOGEN Negative   LEUKOCYTESUR Trace*   RBCUA 4   WBCUA 3   BACTERIA Rare       Significant Imaging:   I have reviewed all pertinent imaging results/findings within the past 24 hours.      ABG  No results for input(s): PH, PO2, PCO2, HCO3, BE in the last 168 hours.  Assessment/Plan:     Cardiac/Vascular  * Atrial fibrillation with RVR  Has not responded to diltiazem or amiodarone.  Has been intolerant to BBs in the past  Echo obtained with report pending.  Trend troponins; though no CP.  Cardiology consult pending.    Elevated troponin  No CP  Likely type 2 in nature  Trend    Renal/  Elevated serum creatinine  Baseline Cr 1.4-1.8.  This reflects CKD    Hematology  Chronic anticoagulation  Continue apixaban      Critical Care Daily Checklist:    A: Awake: RASS Goal/Actual Goal: RASS Goal: 0-->alert and calm  Actual: Velasco Agitation Sedation Scale (RASS): Alert and calm   B: Spontaneous Breathing Trial Performed?     C: SAT & SBT Coordinated?  Not intubated                  D: Delirium: CAM-ICU Overall CAM-ICU: Negative   E: Early Mobility Performed? Will perform   F: Feeding Goal:    Status:     Current Diet Order   Procedures    Diet Cardiac      AS: Analgesia/Sedation PRN acetaminophen   T: Thromboembolic Prophylaxis apixaban   H: HOB > 300 Yes   U: Stress Ulcer Prophylaxis (if needed) pepcid   G: Glucose Control Insulin subcu   B: Bowel Function Stool Occurrence: 1   I: Indwelling Catheter (Lines  & Amy) Necessity None   D: De-escalation of Antimicrobials/Pharmacotherapies None    Plan for the day/ETD Continue amio for now  Follow troponin  Cardiology to consult    Code Status:  Family/Goals of Care: Prior       Critical Care Time: 60 minutes  Critical secondary to Patient has a condition that poses threat to life and bodily function: refractory afib on amiodarone.  May need cardioversion.     Critical care was time spent personally by me on the following activities: development of treatment plan with patient or surrogate and bedside caregivers, discussions with consultants, evaluation of patient's response to treatment, examination of patient, ordering and performing treatments and interventions, ordering and review of laboratory studies, ordering and review of radiographic studies, pulse oximetry, re-evaluation of patient's condition. This critical care time did not overlap with that of any other provider or involve time for any procedures.    Thank you for your consult. I will follow-up with patient. Please contact us if you have any additional questions.     Alexandru Rios MD  Critical Care Medicine  [facility]

## 2022-07-12 NOTE — PLAN OF CARE
Pt transferred from ED via stretcher.   Pt on Amio gtts @0.5.  Pt A&Ox4, up in chair, call light within reach, with non skid footwear on, all safety measures in place.   No acute events overnight. WCTM.    Problem: Adult Inpatient Plan of Care  Goal: Plan of Care Review  Outcome: Ongoing, Progressing  Goal: Patient-Specific Goal (Individualized)  Outcome: Ongoing, Progressing  Goal: Absence of Hospital-Acquired Illness or Injury  Outcome: Ongoing, Progressing  Goal: Optimal Comfort and Wellbeing  Outcome: Ongoing, Progressing  Goal: Readiness for Transition of Care  Outcome: Ongoing, Progressing     Problem: Fall Injury Risk  Goal: Absence of Fall and Fall-Related Injury  Outcome: Ongoing, Progressing

## 2022-07-12 NOTE — SUBJECTIVE & OBJECTIVE
Interval History:  States she had some SOB last night.  CXR with pulm edema and density in LLL.  Patient reports aknown history of nodules in LLL followed by [facility].  Previsouly had biopsy that was non-diagnostic and being monitored with serial CT scans.    Started on metop PO last night.  Denies CP, palpitations, SOB.  Also denies HA, abd pain, nausea, vomiting, diarrhea, fevers, sweats, chills.    Objective:     Vital Signs (Most Recent):  Temp: 97.9 °F (36.6 °C) (07/13/22 0301)  Pulse: 90 (07/13/22 0600)  Resp: (!) 25 (07/13/22 0600)  BP: 135/70 (07/13/22 0600)  SpO2: 96 % (07/13/22 0600)   Vital Signs (24h Range):  Temp:  [97.6 °F (36.4 °C)-98.2 °F (36.8 °C)] 97.9 °F (36.6 °C)  Pulse:  [] 90  Resp:  [19-46] 25  SpO2:  [93 %-98 %] 96 %  BP: ()/() 135/70     Weight: 83.5 kg (184 lb)  Body mass index is 31.58 kg/m².      Intake/Output Summary (Last 24 hours) at 7/13/2022 0758  Last data filed at 7/13/2022 0600  Gross per 24 hour   Intake 1601.69 ml   Output 201 ml   Net 1400.69 ml       Physical Exam  Vitals reviewed.   Constitutional:       Appearance: Normal appearance.   HENT:      Head: Normocephalic and atraumatic.      Nose: Nose normal.   Eyes:      Extraocular Movements: Extraocular movements intact.      Pupils: Pupils are equal, round, and reactive to light.   Cardiovascular:      Rate and Rhythm: Tachycardia present. Rhythm irregular.   Pulmonary:      Effort: Pulmonary effort is normal. No respiratory distress.      Comments: Symmetric chest rise.  Abdominal:      General: Abdomen is flat. There is no distension.      Palpations: Abdomen is soft.   Musculoskeletal:         General: No deformity or signs of injury.      Right lower leg: No edema.      Left lower leg: No edema.   Skin:     General: Skin is warm and dry.   Neurological:      General: No focal deficit present.      Mental Status: She is alert and oriented to person, place, and time.       Vents:        Lines/Drains/Airways       Peripheral Intravenous Line  Duration                  Peripheral IV - Single Lumen 07/11/22 1839 20 G Right Hand 1 day         Peripheral IV - Single Lumen 07/11/22 2108 22 G Left Hand 1 day                    Significant Labs:    CBC/Anemia Profile:  Recent Labs   Lab 07/11/22 1842 07/12/22  0405 07/13/22  0328   WBC 8.50 7.89 8.24   HGB 12.9 13.1 12.3   HCT 38.2 40.3 38.6    230 223   MCV 96 97 98   RDW 12.3 12.1 12.3        Chemistries:  Recent Labs   Lab 07/11/22 1842 07/12/22  0404 07/13/22  0328    143 136   K 4.2 3.9 4.3    104 97   CO2 25 24 22*   BUN 37* 36* 33*   CREATININE 1.5* 1.6* 1.7*   CALCIUM 10.0 10.0 8.9   ALBUMIN 3.8 3.8  --    PROT 7.5 7.6  --    BILITOT 0.3 0.3  --    ALKPHOS 63 66  --    ALT 13 13  --    AST 15 15  --    MG  --  1.8 1.7       A1C: No results for input(s): HGBA1C in the last 48 hours.  ABGs: No results for input(s): PH, PCO2, HCO3, POCSATURATED, BE in the last 48 hours.  Lactic Acid: No results for input(s): LACTATE in the last 48 hours.  Troponin:   Recent Labs   Lab 07/11/22 1842 07/12/22  1015   TROPONINI 0.086* 0.091*     Urine Studies:   Recent Labs   Lab 07/11/22 2118   COLORU Yellow   APPEARANCEUA Clear   PHUR 7.0   SPECGRAV 1.015   PROTEINUA Negative   GLUCUA Negative   KETONESU Negative   BILIRUBINUA Negative   OCCULTUA Trace*   NITRITE Negative   UROBILINOGEN Negative   LEUKOCYTESUR Trace*   RBCUA 4   WBCUA 3   BACTERIA Rare       Significant Imaging:   I have reviewed all pertinent imaging results/findings within the past 24 hours.

## 2022-07-12 NOTE — SUBJECTIVE & OBJECTIVE
Past Medical History:   Diagnosis Date    Anemia 11/29/2018    Anxiety 11/28/2017    Arthritis     Atrial fibrillation with RVR 10/9/2019    Back pain     Basal cell carcinoma 03/29/2018    left mid back    Chronic diastolic congestive heart failure 10/15/2019    CKD (chronic kidney disease) stage 3, GFR 30-59 ml/min 8/8/2016    Colon polyps     reported per patient.     Coronary artery calcification 10/5/2021    Fuchs' corneal dystrophy     General anesthetics causing adverse effect in therapeutic use     woke up during surgery and gagged on ET tube    Glaucoma     Glaucoma     Heart failure with preserved left ventricular function (HFpEF) 7/24/2018    Hyperlipidemia     Hypertension     Macular degeneration dry    Obesity     PVD (peripheral vascular disease) 4/26/2021    PVD (peripheral vascular disease) 4/26/2021    Squamous cell carcinoma 01/08/2019    left shoulder    Stenosis of carotid artery 10/5/2021    Trouble in sleeping     Type 2 diabetes mellitus without complication, without long-term current use of insulin 2/24/2021    Urinary tract infection        Past Surgical History:   Procedure Laterality Date    ADENOIDECTOMY  1938    BREAST BIOPSY Left     1997. benign    BREAST CYST EXCISION Left 1997 approx    CARPAL TUNNEL RELEASE Right 2012 approx    CATARACT EXTRACTION Bilateral 1994    CHOLECYSTECTOMY  1975    CORNEAL TRANSPLANT Bilateral 08/2015 8/2015 - left; Right 4/2016    EYE SURGERY      Fuch's Corneal dystrophy - had 2nd operation with Dr. Quintanilla    EYE SURGERY      Cataract wIOL    left thumb Left 2011    removed cuboid for arthritis    REVERSE TOTAL SHOULDER ARTHROPLASTY Left 8/21/2018    Procedure: ARTHROPLASTY, SHOULDER, TOTAL, REVERSE;  Surgeon: Solomon Lujan MD;  Location: [facility];  Service: Orthopedics;  Laterality: Left;  WITH BICEP TENODESIS    SKIN CANCER EXCISION Left 2017    BCC removal by Dr. Valadez    SLT- OS (aka TRABECULOPLASTY) Left 05/11/2011    repeat 3/14/12     TENOTOMY Left 8/21/2018    Procedure: TENOTOMY;  Surgeon: Solomon Lujan MD;  Location: [facility] OR;  Service: Orthopedics;  Laterality: Left;    TONSILLECTOMY  1938    TREATMENT OF CARDIAC ARRHYTHMIA N/A 10/10/2019    Procedure: CARDIOVERSION;  Surgeon: Pete Durán MD;  Location: [facility] CATH LAB;  Service: Cardiology;  Laterality: N/A;    TREATMENT OF CARDIAC ARRHYTHMIA N/A 12/6/2019    Procedure: CARDIOVERSION;  Surgeon: Samy Castillo MD;  Location: [facility] CATH LAB;  Service: Cardiology;  Laterality: N/A;       Review of patient's allergies indicates:   Allergen Reactions    Carvedilol Swelling     lips  lips  Other reaction(s): swelling    Cephalexin Other (See Comments)     Muscle/joint weakness unable to move     Doxycycline calcium Other (See Comments)     Weakness nausea   sob  Other reaction(s): weakness, nausea, SOB    Erythromycin Other (See Comments)     Complete weakness/could not walk    Gadolinium-containing contrast media Other (See Comments)     angioedema  Other reaction(s): Angioedema    Hydrocodone-acetaminophen      wheezing  wheezing  wheezing  Other reaction(s): wheezing    Inveltys [loteprednol etabonate] Palpitations and Other (See Comments)     Patient stated bp went up.    Labetalol Shortness Of Breath, Nausea Only and Other (See Comments)     Palpitations, LE weakness  Other reaction(s): SOB, palpitations, weakness    Loratadine Other (See Comments)     Double vision/spots  Other reaction(s): double vision    Macrolide antibiotics Other (See Comments)     Complete weakness/could not walk  Complete weakness/could not walk  Complete weakness/could not walk  Complete weakness/could not walk  Other reaction(s): complete weakness    Moxifloxacin Other (See Comments)     Hives   muscle soreness  Other reaction(s): hives, muscle soreness    Naproxen      wheezing  wheezing  wheezing  Other reaction(s): wheezing    Statins-hmg-coa reductase inhibitors Other (See Comments)     Severe Muscle  weakness  Muscle weakness  Severe Muscle weakness  Other reaction(s): severe muscle weakness    Timolol Other (See Comments)     Severe muscle weakness, eye problems.  Other reaction(s): severe muscle weakness    Verapamil Diarrhea     Severe diarrhea.  Other reaction(s): diarrhea    Amlodipine      Myalgias    Other reaction(s): myalgian    Doxycycline     Fenofibrate      Causes muscle weakness  Other reaction(s): muscle weakness    Hydralazine      Other reaction(s): muscle tremors    Hydralazine analogues      Muscle tremors/aches      Hydrocortisone     Isosorbide      Tolerates Imdur    Loteprednol      Other reaction(s): increased BP    Tetanus and diphtheria toxoids     Adhesive Rash     Clonidine patch - 2 mfg - contact dermatitis    Codeine Diarrhea and Other (See Comments)     Loss of balance   Other reaction(s): loss of balance    Doxazosin Palpitations     Other reaction(s): palpitations    Fexofenadine Other (See Comments)     Double vision/spots   Other reaction(s): double vision    Hydrocortisone (bulk) Other (See Comments)     Only suppository/ caused muscle weakness    Latanoprost Other (See Comments)     Muscle weakness  Other reaction(s): muscle weakness    Olopatadine Other (See Comments)     Muscle weakness  Other reaction(s): muscle weakness    Prednisone Anxiety       Current Facility-Administered Medications on File Prior to Encounter   Medication    sodium chloride 0.9% flush 3 mL     Current Outpatient Medications on File Prior to Encounter   Medication Sig    acetaminophen (TYLENOL) 500 MG tablet Take 500 mg by mouth daily as needed. Taking 1 to 3    ALPRAZolam (XANAX) 0.5 MG tablet Take 1 tablet (0.5 mg total) by mouth nightly as needed for Anxiety.    apixaban (ELIQUIS) 2.5 mg Tab Take 1 tablet (2.5 mg total) by mouth 2 (two) times daily.    b complex vitamins capsule Take 1 capsule by mouth once daily.    cholecalciferol, vitamin D3, (VITAMIN D3) 50 mcg (2,000 unit) Cap Take 1 capsule  by mouth once daily.    cloNIDine (CATAPRES) 0.1 MG tablet Take 1 tablet (0.1 mg total) by mouth 2 (two) times daily as needed (systolic BP over 180).    coenzyme Q10 200 mg capsule Take 400 mg by mouth once daily.     fish oil-omega-3 fatty acids 300-1,000 mg capsule Take 2 g by mouth 2 (two) times daily.     furosemide (LASIX) 40 MG tablet Take 1 tablet (40 mg total) by mouth daily as needed (edema, swelling, fluid).    hydroCHLOROthiazide (HYDRODIURIL) 12.5 MG Tab Take 1 tablet (12.5 mg total) by mouth once daily.    isosorbide mononitrate (IMDUR) 30 MG 24 hr tablet Take 1 tablet (30 mg total) by mouth every evening.    losartan (COZAAR) 25 MG tablet Take 1 tablet (25 mg total) by mouth 2 (two) times daily.    loteprednol (LOTEMAX) 0.5 % ophthalmic suspension Place 1 drop into the right eye once daily. Right eye only (Patient not taking: Reported on 7/11/2022)    metoprolol tartrate (LOPRESSOR) 25 MG tablet TAKE 1/2 TABLET BY MOUTH TWICE DAILY    mupirocin (BACTROBAN) 2 % ointment apply to affected area twice a day with Q-tip. Antibiotic ointment.    netarsudiL (RHOPRESSA) 0.02 % ophthalmic solution Place 1 drop into the left eye every evening.    POLYETHYLENE GLYCOL 3350 (MIRALAX ORAL) Take 17 g by mouth 2 (two) times a day. Taking BID    travoprost (TRAVATAN Z) 0.004 % ophthalmic solution PLACE 1 DROP INTO THE LEFT EYE EVERY EVENING.     Family History       Problem Relation (Age of Onset)    Cancer Father (88)    Heart disease Mother    Hypertension Mother          Tobacco Use    Smoking status: Never Smoker    Smokeless tobacco: Never Used    Tobacco comment: history of passive smoking from her ex-   Substance and Sexual Activity    Alcohol use: Yes     Alcohol/week: 2.0 standard drinks     Types: 2 Standard drinks or equivalent per week     Comment: occ    Drug use: No    Sexual activity: Not Currently     Partners: Male     Birth control/protection: Post-menopausal     Comment: discussed protection  for STD's     Review of Systems   Constitutional:  Positive for fatigue. Negative for chills and fever.   HENT: Negative.  Negative for congestion, rhinorrhea, sore throat and trouble swallowing.    Eyes: Negative.  Negative for visual disturbance.   Respiratory: Negative.  Negative for cough, shortness of breath and wheezing.    Cardiovascular:  Positive for palpitations. Negative for chest pain.   Gastrointestinal: Negative.  Negative for abdominal pain, diarrhea, nausea and vomiting.   Endocrine: Negative.    Genitourinary: Negative.  Negative for dysuria and flank pain.   Musculoskeletal: Negative.  Negative for back pain.   Skin: Negative.  Negative for rash.   Allergic/Immunologic: Negative.    Neurological: Negative.  Negative for speech difficulty, weakness, numbness and headaches.   Hematological: Negative.    Psychiatric/Behavioral: Negative.  Negative for hallucinations. The patient is not nervous/anxious.    All other systems reviewed and are negative.  Objective:     Vital Signs (Most Recent):  Temp: 98.2 °F (36.8 °C) (07/11/22 1608)  Pulse: (!) 150 (07/11/22 2023)  Resp: 18 (07/11/22 2023)  BP: 128/68 (07/11/22 2023)  SpO2: 97 % (07/11/22 2023)   Vital Signs (24h Range):  Temp:  [98.2 °F (36.8 °C)-98.5 °F (36.9 °C)] 98.2 °F (36.8 °C)  Pulse:  [] 150  Resp:  [18-20] 18  SpO2:  [95 %-97 %] 97 %  BP: (100-151)/(62-85) 128/68     Weight: 83.6 kg (184 lb 4.9 oz)  Body mass index is 31.64 kg/m².    Physical Exam  Vitals and nursing note reviewed.   Constitutional:       General: She is awake. She is not in acute distress.     Appearance: She is obese. She is not ill-appearing.   HENT:      Head: Normocephalic and atraumatic.      Mouth/Throat:      Mouth: Mucous membranes are moist.   Eyes:      General: No scleral icterus.     Conjunctiva/sclera: Conjunctivae normal.   Cardiovascular:      Rate and Rhythm: Tachycardia present. Rhythm irregular.      Heart sounds: No murmur heard.  Pulmonary:       Effort: Pulmonary effort is normal. No respiratory distress.      Breath sounds: Normal breath sounds. No wheezing.   Abdominal:      Palpations: Abdomen is soft.      Tenderness: There is no abdominal tenderness.   Musculoskeletal:         General: No swelling. Normal range of motion.      Cervical back: Normal range of motion and neck supple.   Skin:     General: Skin is warm.      Coloration: Skin is not jaundiced.   Neurological:      General: No focal deficit present.      Mental Status: She is alert and oriented to person, place, and time. Mental status is at baseline.   Psychiatric:         Attention and Perception: Attention normal.         Speech: Speech normal.         Behavior: Behavior is cooperative.           Significant Labs: All pertinent labs within the past 24 hours have been reviewed.  BMP:   Recent Labs   Lab 07/11/22 1842   *      K 4.2      CO2 25   BUN 37*   CREATININE 1.5*   CALCIUM 10.0     CBC:   Recent Labs   Lab 07/11/22 1842   WBC 8.50   HGB 12.9   HCT 38.2        CMP:   Recent Labs   Lab 07/11/22 1842      K 4.2      CO2 25   *   BUN 37*   CREATININE 1.5*   CALCIUM 10.0   PROT 7.5   ALBUMIN 3.8   BILITOT 0.3   ALKPHOS 63   AST 15   ALT 13   ANIONGAP 13   EGFRNONAA 31*     Cardiac Markers:   Recent Labs   Lab 07/11/22 1842   *     Troponin:   Recent Labs   Lab 07/11/22 1842   TROPONINI 0.086*       Significant Imaging: I have reviewed all pertinent imaging results/findings within the past 24 hours.  I have reviewed and interpreted all pertinent imaging results/findings within the past 24 hours.    Imaging Results    None         I have independently reviewed and interpreted the EKG.     I have independently reviewed all pertinent labs within the past 24 hours.    I have independently reviewed, visualized and interpreted all pertinent imaging results within the past 24 hours and discussed the findings with the ED physician,   Beatriz

## 2022-07-12 NOTE — PROGRESS NOTES
[facility] - Intensive Care [facility] Medicine  Progress Note    Patient Name: Shirley Metz  MRN: [id number]  Patient Class: IP- Inpatient   Admission Date: 7/11/2022  Length of Stay: 1 days  Attending Physician: Nasrin Christiansen MD  Primary Care Provider: Heather Miller NP        Subjective:     Principal Problem:Atrial fibrillation with RVR        HPI:  Ms. Bettencourt is a elderly 89-year-old  female with PMH significant for atrial fibrillation on apixaban power, diastolic CHF, HTN, was sent to the ED by her PCP for atrial fibrillation with RVR.  Patient has been complaining of palpitations, fatigue.  Denies chest pain or shortness of breath.  In the ED she was found to have HR in the 150s.  Received diltiazem 10 mg IV x2 doses with no significant improvement.  Started on diltiazem drip, titrating.  Cardiology notified.  Patient will be admitted to the ICU.    Admitting diagnosis:  Atrial fibrillation with RVR.      Overview/Hospital Course:  7/12: Afib RVR not responsive to Cardizem overnight, started on amiodarone and cardiology consult pending. Afebrile, FIOR, no acute distress      Review of Systems   Constitutional:  Negative for chills, fatigue and fever.   HENT: Negative.  Negative for congestion, rhinorrhea, sore throat and trouble swallowing.    Eyes: Negative.  Negative for visual disturbance.   Respiratory: Negative.  Negative for cough, shortness of breath and wheezing.    Cardiovascular:  Positive for palpitations. Negative for chest pain.   Gastrointestinal: Negative.  Negative for abdominal pain, diarrhea, nausea and vomiting.   Endocrine: Negative.    Genitourinary: Negative.  Negative for dysuria and flank pain.   Musculoskeletal: Negative.  Negative for back pain.   Skin: Negative.  Negative for rash.   Allergic/Immunologic: Negative.    Neurological: Negative.  Negative for seizures, speech difficulty, weakness, numbness and headaches.   Hematological: Negative.     Psychiatric/Behavioral: Negative.  Negative for hallucinations. The patient is not nervous/anxious.    All other systems reviewed and are negative.  Objective:     Vital Signs (Most Recent):  Temp: 97.6 °F (36.4 °C) (07/12/22 1130)  Pulse: (!) 128 (07/12/22 1130)  Resp: (!) 23 (07/12/22 1130)  BP: (!) 141/87 (07/12/22 1130)  SpO2: 97 % (07/12/22 1130)   Vital Signs (24h Range):  Temp:  [97.5 °F (36.4 °C)-98.6 °F (37 °C)] 97.6 °F (36.4 °C)  Pulse:  [] 128  Resp:  [18-42] 23  SpO2:  [95 %-98 %] 97 %  BP: (100-177)/() 141/87     Weight: 83.5 kg (184 lb)  Body mass index is 31.58 kg/m².    Intake/Output Summary (Last 24 hours) at 7/12/2022 1144  Last data filed at 7/12/2022 1115  Gross per 24 hour   Intake 679.19 ml   Output --   Net 679.19 ml      Physical Exam  Vitals and nursing note reviewed.   Constitutional:       General: She is awake. She is not in acute distress.     Appearance: She is obese. She is not ill-appearing.   HENT:      Head: Normocephalic and atraumatic.      Mouth/Throat:      Mouth: Mucous membranes are moist.   Eyes:      General: No scleral icterus.     Conjunctiva/sclera: Conjunctivae normal.   Cardiovascular:      Rate and Rhythm: Tachycardia present. Rhythm irregular.      Heart sounds: No murmur heard.  Pulmonary:      Effort: Pulmonary effort is normal. No respiratory distress.      Breath sounds: Normal breath sounds. No wheezing.   Abdominal:      Palpations: Abdomen is soft.      Tenderness: There is no abdominal tenderness.   Musculoskeletal:         General: No swelling. Normal range of motion.      Cervical back: Normal range of motion and neck supple.   Skin:     General: Skin is warm.      Coloration: Skin is not jaundiced.   Neurological:      General: No focal deficit present.      Mental Status: She is alert and oriented to person, place, and time. Mental status is at baseline.   Psychiatric:         Attention and Perception: Attention normal.         Speech: Speech  normal.         Behavior: Behavior is cooperative.       Significant Labs: All pertinent labs within the past 24 hours have been reviewed.    Significant Imaging: I have reviewed all pertinent imaging results/findings within the past 24 hours.      Assessment/Plan:      * Atrial fibrillation with RVR  -HR in the 150s.  -diltiazem 10 mg IV x2 doses.  -currently on diltiazem drip, titrating.  -started on amiodarone  -admit to ICU.  -cardiology consult.  -continue home dose apixaban.      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  Monitor.      Chronic anticoagulation  -continue apixaban      Elevated troponin  Demand ischemia s/t above        VTE Risk Mitigation (From admission, onward)         Ordered     apixaban tablet 2.5 mg  2 times daily         07/11/22 2131     Place sequential compression device  Until discontinued         07/11/22 2131                Discharge Planning   SHAYNA:      Code Status: Prior   Is the patient medically ready for discharge?:     Reason for patient still in hospital (select all that apply): Patient trending condition, Treatment and Consult recommendations               Critical care time spent on the evaluation and treatment of severe organ dysfunction, review of pertinent labs and imaging studies, discussions with consulting providers and discussions with patient/family: 30 minutes.      Nasrin Christiansen MD  Department of Hospital Medicine   '[facility] - Intensive Care ([facility])

## 2022-07-12 NOTE — PLAN OF CARE
[facility] - Intensive Care (Hospital)  Initial Discharge Assessment       Primary Care Provider: Heather Miller NP    Admission Diagnosis: Tachycardia [R00.0]  Atrial flutter with rapid ventricular response [I48.92]  Typical atrial flutter [I48.3]  Atrial fibrillation with RVR [I48.91]    Admission Date: 7/11/2022  Expected Discharge Date:     Discharge Barriers Identified: None    Payor: HUMANA / Plan: HUMANA TOTAL CARE / Product Type: HMO /     Extended Emergency Contact Information  Primary Emergency Contact: Arie Bettencourt  Address: [address] [address]           [place] [address] [facility]  Home Phone: [phone]  Mobile Phone: [phone]  Relation: Son  Secondary Emergency Contact: Yandy Bettencourt  Address: [address] [address] [facility]  Home Phone: [phone]  Work Phone: [phone]  Mobile Phone: [phone]  Relation: Relative    Discharge Plan A: Home Health         [facility] - [place], [place] - [address]  [address] [facility] [place] [address]  Phone: [phone] Fax: [phone]      Initial Assessment (most recent)     Adult Discharge Assessment - 07/12/22 1231        Discharge Assessment    Assessment Type Discharge Planning Assessment     Confirmed/corrected address, phone number and insurance Yes     Confirmed Demographics Correct on Facesheet     Source of Information patient     When was your last doctors appointment? 07/11/22     Communicated SHAYNA with patient/caregiver Date not available/Unable to determine     Reason For Admission A-fib with RVR     Lives With alone     Facility Arrived From: [facility]     Do you expect to return to your current living situation? Yes     Do you have help at home or someone to help you manage your care at home? Yes     Who are your caregiver(s) and their phone number(s)? Sarahkaren Grijalvaon (son) and Yandy Bettencourt  (daughter)     Prior to hospitilization cognitive status: Alert/Oriented     Current cognitive status: Alert/Oriented     Walking or Climbing Stairs Difficulty ambulation difficulty, requires equipment;stair climbing difficulty, requires equipment     Mobility Management rolling walker     Dressing/Bathing Difficulty none     Home Accessibility wheelchair accessible     Home Layout Able to live on 1st floor     Equipment Currently Used at Home cane, quad;walker, rolling     Readmission within 30 days? No     Patient currently being followed by outpatient case management? No     Do you currently have service(s) that help you manage your care at home? Yes     Name and Contact number of agency [facility]     Is the pt/caregiver preference to resume services with current agency Yes     Do you take prescription medications? Yes     Do you have prescription coverage? Yes     Do you have any problems affording any of your prescribed medications? No     Is the patient taking medications as prescribed? yes     Who is going to help you get home at discharge? children     How do you get to doctors appointments? other (see comments)   facility staff    Are you on dialysis? No     Do you take coumadin? No     Discharge Plan A Home Health     DME Needed Upon Discharge  none     Discharge Plan discussed with: Patient     Discharge Barriers Identified None               Anticipated DC dispo: home health ([facility])   Prior Level of Function: lives alone, ambulates using a rollator   PCP: Heather Miller NP  Comments:  CM met with patient at bedside to introduce role and discuss discharge planning. Patient lives alone at [facility]. Children will be help at home if needed and can provide transport at time of discharge. CM discharge needs depends on hospital progress. CM will continue following to assist with other needs.

## 2022-07-12 NOTE — SUBJECTIVE & OBJECTIVE
Review of Systems   Constitutional:  Negative for chills, fatigue and fever.   HENT: Negative.  Negative for congestion, rhinorrhea, sore throat and trouble swallowing.    Eyes: Negative.  Negative for visual disturbance.   Respiratory: Negative.  Negative for cough, shortness of breath and wheezing.    Cardiovascular:  Positive for palpitations. Negative for chest pain.   Gastrointestinal: Negative.  Negative for abdominal pain, diarrhea, nausea and vomiting.   Endocrine: Negative.    Genitourinary: Negative.  Negative for dysuria and flank pain.   Musculoskeletal: Negative.  Negative for back pain.   Skin: Negative.  Negative for rash.   Allergic/Immunologic: Negative.    Neurological: Negative.  Negative for seizures, speech difficulty, weakness, numbness and headaches.   Hematological: Negative.    Psychiatric/Behavioral: Negative.  Negative for hallucinations. The patient is not nervous/anxious.    All other systems reviewed and are negative.  Objective:     Vital Signs (Most Recent):  Temp: 97.6 °F (36.4 °C) (07/12/22 1130)  Pulse: (!) 128 (07/12/22 1130)  Resp: (!) 23 (07/12/22 1130)  BP: (!) 141/87 (07/12/22 1130)  SpO2: 97 % (07/12/22 1130)   Vital Signs (24h Range):  Temp:  [97.5 °F (36.4 °C)-98.6 °F (37 °C)] 97.6 °F (36.4 °C)  Pulse:  [] 128  Resp:  [18-42] 23  SpO2:  [95 %-98 %] 97 %  BP: (100-177)/() 141/87     Weight: 83.5 kg (184 lb)  Body mass index is 31.58 kg/m².    Intake/Output Summary (Last 24 hours) at 7/12/2022 1144  Last data filed at 7/12/2022 1115  Gross per 24 hour   Intake 679.19 ml   Output --   Net 679.19 ml      Physical Exam  Vitals and nursing note reviewed.   Constitutional:       General: She is awake. She is not in acute distress.     Appearance: She is obese. She is not ill-appearing.   HENT:      Head: Normocephalic and atraumatic.      Mouth/Throat:      Mouth: Mucous membranes are moist.   Eyes:      General: No scleral icterus.     Conjunctiva/sclera: Conjunctivae  normal.   Cardiovascular:      Rate and Rhythm: Tachycardia present. Rhythm irregular.      Heart sounds: No murmur heard.  Pulmonary:      Effort: Pulmonary effort is normal. No respiratory distress.      Breath sounds: Normal breath sounds. No wheezing.   Abdominal:      Palpations: Abdomen is soft.      Tenderness: There is no abdominal tenderness.   Musculoskeletal:         General: No swelling. Normal range of motion.      Cervical back: Normal range of motion and neck supple.   Skin:     General: Skin is warm.      Coloration: Skin is not jaundiced.   Neurological:      General: No focal deficit present.      Mental Status: She is alert and oriented to person, place, and time. Mental status is at baseline.   Psychiatric:         Attention and Perception: Attention normal.         Speech: Speech normal.         Behavior: Behavior is cooperative.       Significant Labs: All pertinent labs within the past 24 hours have been reviewed.    Significant Imaging: I have reviewed all pertinent imaging results/findings within the past 24 hours.

## 2022-07-12 NOTE — PLAN OF CARE
Plan of care reviewed with pt, she verbalized understanding. Pt remains on amio and cardizem for a-fib and rate control; HR remains in the 130-140s a-fib/a-flutter this shift. Pt receiving eliquis. Pt afebrile this shift. No acute events this shift. Denies needs at this time.

## 2022-07-12 NOTE — PT/OT/SLP PROGRESS
Occupational Therapy      Patient Name:  Shirley Metz   MRN:  [id number]     OT eval orders received. Chart review completed. Spoke with ordering MD, Dr. Rios, at 0900. Medical team still working on POC for the day. OK to see patient tomorrow 7/13/22, AM.     Will complete at that time.    Thank you,  Angela Marie, OT    7/12/2022   0900

## 2022-07-12 NOTE — H&P
[facility]  [facility] Medicine  History & Physical    Patient Name: Shirley Metz  MRN: [id number]  Patient Class: IP- Inpatient  Admission Date: 7/11/2022  Attending Physician: Adelita Henderson MD   Primary Care Provider: Heather Miller NP         Patient information was obtained from patient, past medical records and ER records.     Subjective:     Principal Problem:Atrial fibrillation with RVR    Chief Complaint:   Chief Complaint   Patient presents with    Tachycardia     Pt seen at PCP and referred to ED. Pt A Fib with RVR. No CP SOB or other CC        HPI: Ms. Bettencourt is a elderly 89-year-old  female with PMH significant for atrial fibrillation on apixaban power, diastolic CHF, HTN, was sent to the ED by her PCP for atrial fibrillation with RVR.  Patient has been complaining of palpitations, fatigue.  Denies chest pain or shortness of breath.  In the ED she was found to have HR in the 150s.  Received diltiazem 10 mg IV x2 doses with no significant improvement.  Started on diltiazem drip, titrating.  Cardiology notified.  Patient will be admitted to the ICU.    Admitting diagnosis:  Atrial fibrillation with RVR.      Past Medical History:   Diagnosis Date    Anemia 11/29/2018    Anxiety 11/28/2017    Arthritis     Atrial fibrillation with RVR 10/9/2019    Back pain     Basal cell carcinoma 03/29/2018    left mid back    Chronic diastolic congestive heart failure 10/15/2019    CKD (chronic kidney disease) stage 3, GFR 30-59 ml/min 8/8/2016    Colon polyps     reported per patient.     Coronary artery calcification 10/5/2021    Fuchs' corneal dystrophy     General anesthetics causing adverse effect in therapeutic use     woke up during surgery and gagged on ET tube    Glaucoma     Glaucoma     Heart failure with preserved left ventricular function (HFpEF) 7/24/2018    Hyperlipidemia     Hypertension     Macular degeneration dry    Obesity     PVD (peripheral  vascular disease) 4/26/2021    PVD (peripheral vascular disease) 4/26/2021    Squamous cell carcinoma 01/08/2019    left shoulder    Stenosis of carotid artery 10/5/2021    Trouble in sleeping     Type 2 diabetes mellitus without complication, without long-term current use of insulin 2/24/2021    Urinary tract infection        Past Surgical History:   Procedure Laterality Date    ADENOIDECTOMY  1938    BREAST BIOPSY Left     1997. benign    BREAST CYST EXCISION Left 1997 approx    CARPAL TUNNEL RELEASE Right 2012 approx    CATARACT EXTRACTION Bilateral 1994    CHOLECYSTECTOMY  1975    CORNEAL TRANSPLANT Bilateral 08/2015 8/2015 - left; Right 4/2016    EYE SURGERY      Fuch's Corneal dystrophy - had 2nd operation with Dr. Quintanilla    EYE SURGERY      Cataract wIOL    left thumb Left 2011    removed cuboid for arthritis    REVERSE TOTAL SHOULDER ARTHROPLASTY Left 8/21/2018    Procedure: ARTHROPLASTY, SHOULDER, TOTAL, REVERSE;  Surgeon: Solomon Lujan MD;  Location: [facility];  Service: Orthopedics;  Laterality: Left;  WITH BICEP TENODESIS    SKIN CANCER EXCISION Left 2017    BCC removal by Dr. Valadez    SLT- OS (aka TRABECULOPLASTY) Left 05/11/2011    repeat 3/14/12    TENOTOMY Left 8/21/2018    Procedure: TENOTOMY;  Surgeon: Solomon Lujan MD;  Location: [facility];  Service: Orthopedics;  Laterality: Left;    TONSILLECTOMY  1938    TREATMENT OF CARDIAC ARRHYTHMIA N/A 10/10/2019    Procedure: CARDIOVERSION;  Surgeon: Pete Durán MD;  Location: [facility];  Service: Cardiology;  Laterality: N/A;    TREATMENT OF CARDIAC ARRHYTHMIA N/A 12/6/2019    Procedure: CARDIOVERSION;  Surgeon: Samy Castillo MD;  Location: [facility];  Service: Cardiology;  Laterality: N/A;       Review of patient's allergies indicates:   Allergen Reactions    Carvedilol Swelling     lips  lips  Other reaction(s): swelling    Cephalexin Other (See Comments)     Muscle/joint weakness unable to move      Doxycycline calcium Other (See Comments)     Weakness nausea   sob  Other reaction(s): weakness, nausea, SOB    Erythromycin Other (See Comments)     Complete weakness/could not walk    Gadolinium-containing contrast media Other (See Comments)     angioedema  Other reaction(s): Angioedema    Hydrocodone-acetaminophen      wheezing  wheezing  wheezing  Other reaction(s): wheezing    Inveltys [loteprednol etabonate] Palpitations and Other (See Comments)     Patient stated bp went up.    Labetalol Shortness Of Breath, Nausea Only and Other (See Comments)     Palpitations, LE weakness  Other reaction(s): SOB, palpitations, weakness    Loratadine Other (See Comments)     Double vision/spots  Other reaction(s): double vision    Macrolide antibiotics Other (See Comments)     Complete weakness/could not walk  Complete weakness/could not walk  Complete weakness/could not walk  Complete weakness/could not walk  Other reaction(s): complete weakness    Moxifloxacin Other (See Comments)     Hives   muscle soreness  Other reaction(s): hives, muscle soreness    Naproxen      wheezing  wheezing  wheezing  Other reaction(s): wheezing    Statins-hmg-coa reductase inhibitors Other (See Comments)     Severe Muscle weakness  Muscle weakness  Severe Muscle weakness  Other reaction(s): severe muscle weakness    Timolol Other (See Comments)     Severe muscle weakness, eye problems.  Other reaction(s): severe muscle weakness    Verapamil Diarrhea     Severe diarrhea.  Other reaction(s): diarrhea    Amlodipine      Myalgias    Other reaction(s): myalgian    Doxycycline     Fenofibrate      Causes muscle weakness  Other reaction(s): muscle weakness    Hydralazine      Other reaction(s): muscle tremors    Hydralazine analogues      Muscle tremors/aches      Hydrocortisone     Isosorbide      Tolerates Imdur    Loteprednol      Other reaction(s): increased BP    Tetanus and diphtheria toxoids     Adhesive Rash      Clonidine patch - 2 mfg - contact dermatitis    Codeine Diarrhea and Other (See Comments)     Loss of balance   Other reaction(s): loss of balance    Doxazosin Palpitations     Other reaction(s): palpitations    Fexofenadine Other (See Comments)     Double vision/spots   Other reaction(s): double vision    Hydrocortisone (bulk) Other (See Comments)     Only suppository/ caused muscle weakness    Latanoprost Other (See Comments)     Muscle weakness  Other reaction(s): muscle weakness    Olopatadine Other (See Comments)     Muscle weakness  Other reaction(s): muscle weakness    Prednisone Anxiety       Current Facility-Administered Medications on File Prior to Encounter   Medication    sodium chloride 0.9% flush 3 mL     Current Outpatient Medications on File Prior to Encounter   Medication Sig    acetaminophen (TYLENOL) 500 MG tablet Take 500 mg by mouth daily as needed. Taking 1 to 3    ALPRAZolam (XANAX) 0.5 MG tablet Take 1 tablet (0.5 mg total) by mouth nightly as needed for Anxiety.    apixaban (ELIQUIS) 2.5 mg Tab Take 1 tablet (2.5 mg total) by mouth 2 (two) times daily.    b complex vitamins capsule Take 1 capsule by mouth once daily.    cholecalciferol, vitamin D3, (VITAMIN D3) 50 mcg (2,000 unit) Cap Take 1 capsule by mouth once daily.    cloNIDine (CATAPRES) 0.1 MG tablet Take 1 tablet (0.1 mg total) by mouth 2 (two) times daily as needed (systolic BP over 180).    coenzyme Q10 200 mg capsule Take 400 mg by mouth once daily.     fish oil-omega-3 fatty acids 300-1,000 mg capsule Take 2 g by mouth 2 (two) times daily.     furosemide (LASIX) 40 MG tablet Take 1 tablet (40 mg total) by mouth daily as needed (edema, swelling, fluid).    hydroCHLOROthiazide (HYDRODIURIL) 12.5 MG Tab Take 1 tablet (12.5 mg total) by mouth once daily.    isosorbide mononitrate (IMDUR) 30 MG 24 hr tablet Take 1 tablet (30 mg total) by mouth every evening.    losartan (COZAAR) 25 MG tablet Take 1 tablet (25 mg  total) by mouth 2 (two) times daily.    loteprednol (LOTEMAX) 0.5 % ophthalmic suspension Place 1 drop into the right eye once daily. Right eye only (Patient not taking: Reported on 7/11/2022)    metoprolol tartrate (LOPRESSOR) 25 MG tablet TAKE 1/2 TABLET BY MOUTH TWICE DAILY    mupirocin (BACTROBAN) 2 % ointment apply to affected area twice a day with Q-tip. Antibiotic ointment.    netarsudiL (RHOPRESSA) 0.02 % ophthalmic solution Place 1 drop into the left eye every evening.    POLYETHYLENE GLYCOL 3350 (MIRALAX ORAL) Take 17 g by mouth 2 (two) times a day. Taking BID    travoprost (TRAVATAN Z) 0.004 % ophthalmic solution PLACE 1 DROP INTO THE LEFT EYE EVERY EVENING.     Family History       Problem Relation (Age of Onset)    Cancer Father (88)    Heart disease Mother    Hypertension Mother          Tobacco Use    Smoking status: Never Smoker    Smokeless tobacco: Never Used    Tobacco comment: history of passive smoking from her ex-   Substance and Sexual Activity    Alcohol use: Yes     Alcohol/week: 2.0 standard drinks     Types: 2 Standard drinks or equivalent per week     Comment: occ    Drug use: No    Sexual activity: Not Currently     Partners: Male     Birth control/protection: Post-menopausal     Comment: discussed protection for STD's     Review of Systems   Constitutional:  Positive for fatigue. Negative for chills and fever.   HENT: Negative.  Negative for congestion, rhinorrhea, sore throat and trouble swallowing.    Eyes: Negative.  Negative for visual disturbance.   Respiratory: Negative.  Negative for cough, shortness of breath and wheezing.    Cardiovascular:  Positive for palpitations. Negative for chest pain.   Gastrointestinal: Negative.  Negative for abdominal pain, diarrhea, nausea and vomiting.   Endocrine: Negative.    Genitourinary: Negative.  Negative for dysuria and flank pain.   Musculoskeletal: Negative.  Negative for back pain.   Skin: Negative.  Negative for rash.    Allergic/Immunologic: Negative.    Neurological: Negative.  Negative for speech difficulty, weakness, numbness and headaches.   Hematological: Negative.    Psychiatric/Behavioral: Negative.  Negative for hallucinations. The patient is not nervous/anxious.    All other systems reviewed and are negative.  Objective:     Vital Signs (Most Recent):  Temp: 98.2 °F (36.8 °C) (07/11/22 1608)  Pulse: (!) 150 (07/11/22 2023)  Resp: 18 (07/11/22 2023)  BP: 128/68 (07/11/22 2023)  SpO2: 97 % (07/11/22 2023)   Vital Signs (24h Range):  Temp:  [98.2 °F (36.8 °C)-98.5 °F (36.9 °C)] 98.2 °F (36.8 °C)  Pulse:  [] 150  Resp:  [18-20] 18  SpO2:  [95 %-97 %] 97 %  BP: (100-151)/(62-85) 128/68     Weight: 83.6 kg (184 lb 4.9 oz)  Body mass index is 31.64 kg/m².    Physical Exam  Vitals and nursing note reviewed.   Constitutional:       General: She is awake. She is not in acute distress.     Appearance: She is obese. She is not ill-appearing.   HENT:      Head: Normocephalic and atraumatic.      Mouth/Throat:      Mouth: Mucous membranes are moist.   Eyes:      General: No scleral icterus.     Conjunctiva/sclera: Conjunctivae normal.   Cardiovascular:      Rate and Rhythm: Tachycardia present. Rhythm irregular.      Heart sounds: No murmur heard.  Pulmonary:      Effort: Pulmonary effort is normal. No respiratory distress.      Breath sounds: Normal breath sounds. No wheezing.   Abdominal:      Palpations: Abdomen is soft.      Tenderness: There is no abdominal tenderness.   Musculoskeletal:         General: No swelling. Normal range of motion.      Cervical back: Normal range of motion and neck supple.   Skin:     General: Skin is warm.      Coloration: Skin is not jaundiced.   Neurological:      General: No focal deficit present.      Mental Status: She is alert and oriented to person, place, and time. Mental status is at baseline.   Psychiatric:         Attention and Perception: Attention normal.         Speech: Speech  normal.         Behavior: Behavior is cooperative.           Significant Labs: All pertinent labs within the past 24 hours have been reviewed.  BMP:   Recent Labs   Lab 07/11/22 1842   *      K 4.2      CO2 25   BUN 37*   CREATININE 1.5*   CALCIUM 10.0     CBC:   Recent Labs   Lab 07/11/22 1842   WBC 8.50   HGB 12.9   HCT 38.2        CMP:   Recent Labs   Lab 07/11/22 1842      K 4.2      CO2 25   *   BUN 37*   CREATININE 1.5*   CALCIUM 10.0   PROT 7.5   ALBUMIN 3.8   BILITOT 0.3   ALKPHOS 63   AST 15   ALT 13   ANIONGAP 13   EGFRNONAA 31*     Cardiac Markers:   Recent Labs   Lab 07/11/22 1842   *     Troponin:   Recent Labs   Lab 07/11/22 1842   TROPONINI 0.086*       Significant Imaging: I have reviewed all pertinent imaging results/findings within the past 24 hours.  I have reviewed and interpreted all pertinent imaging results/findings within the past 24 hours.    Imaging Results    None         I have independently reviewed and interpreted the EKG.     I have independently reviewed all pertinent labs within the past 24 hours.    I have independently reviewed, visualized and interpreted all pertinent imaging results within the past 24 hours and discussed the findings with the ED physician, Dr. Henderson          Assessment/Plan:     * Atrial fibrillation with RVR  -HR in the 150s.  -diltiazem 10 mg IV x2 doses.  -currently on diltiazem drip, titrating.  -consider amiodarone drip if no improvement.  -admit to ICU.  -cardiology consult.  -continue home dose apixaban.      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  Monitor.      Chronic anticoagulation  -continue apixaban        VTE Risk Mitigation (From admission, onward)         Ordered     apixaban tablet 2.5 mg  2 times daily         07/11/22 2131     Place sequential compression device  Until discontinued         07/11/22 2131                   Sebastián Irby MD  Department of Hospital  Medicine   [facility] - Emergency Dept.

## 2022-07-12 NOTE — PLAN OF CARE
07/12/22 1252   Post-Acute Status   Post-Acute Authorization Home Health   Home Health Status Referrals Sent   Hospital Resources/Appts/Education Provided Post-Acute resouces added to AVS   Discharge Delays None known at this time   Discharge Plan   Discharge Plan A Home Health     Referral sent to [facility] to resume services at [place].

## 2022-07-12 NOTE — PROGRESS NOTES
I discussed code status with patient.  She would like to be a full code.  Code status order placed for full code.

## 2022-07-12 NOTE — PT/OT/SLP PROGRESS
Physical Therapy      Patient Name:  Shirley Metz   MRN:  [id number]    MERCEDES LOWE INITIATED THIS AM, SPOKE TO ORDERING PHYSICIAN, DR. JAVED WHO REQUESTS TO START P.T. TOMORROW, WILL ASSESS PT NEXT VISIT    Adelita Landis, PT  7/12/2022  0938

## 2022-07-12 NOTE — HPI
89-year-old  female with PMH significant for atrial fibrillation on apixaban power, diastolic CHF, HTN, was sent to the ED by her PCP for atrial fibrillation with RVR.  Patient has been complaining of palpitations, fatigue. Pt started initially on IV cardizem but dc and is on IV amiodarone.  Pt with hx of cardioversions x 2

## 2022-07-12 NOTE — HPI
Ms. Bettencourt is a elderly 89-year-old  female with PMH significant for atrial fibrillation on apixaban power, diastolic CHF, HTN, was sent to the ED by her PCP for atrial fibrillation with RVR.  Patient has been complaining of palpitations, fatigue.  Denies chest pain or shortness of breath.  In the ED she was found to have HR in the 150s.  Received diltiazem 10 mg IV x2 doses with no significant improvement.  Started on diltiazem drip, titrating.  Cardiology notified.  Patient will be admitted to the ICU.    Admitting diagnosis:  Atrial fibrillation with RVR.

## 2022-07-12 NOTE — HOSPITAL COURSE
7/12: Afib RVR not responsive to Cardizem overnight, started on amiodarone and cardiology consult pending. Afebrile, FIOR, no acute distress  7/13: Started on PO Lopressor and Cardizem, PO amiodarone with improvement in HR, still in Afib. Stable for floor transfer    Admitted for Afib RVR that improved with PO cardizem, lopressor and amiodarone, transitioned from IV drips. Stable for d/c with cards f/u

## 2022-07-13 LAB
ANION GAP SERPL CALC-SCNC: 17 MMOL/L (ref 8–16)
BASOPHILS # BLD AUTO: 0.04 K/UL (ref 0–0.2)
BASOPHILS NFR BLD: 0.5 % (ref 0–1.9)
BUN SERPL-MCNC: 33 MG/DL (ref 8–23)
CALCIUM SERPL-MCNC: 8.9 MG/DL (ref 8.7–10.5)
CHLORIDE SERPL-SCNC: 97 MMOL/L (ref 95–110)
CO2 SERPL-SCNC: 22 MMOL/L (ref 23–29)
CREAT SERPL-MCNC: 1.7 MG/DL (ref 0.5–1.4)
DIFFERENTIAL METHOD: ABNORMAL
EOSINOPHIL # BLD AUTO: 0.2 K/UL (ref 0–0.5)
EOSINOPHIL NFR BLD: 2.9 % (ref 0–8)
ERYTHROCYTE [DISTWIDTH] IN BLOOD BY AUTOMATED COUNT: 12.3 % (ref 11.5–14.5)
EST. GFR  (AFRICAN AMERICAN): 30 ML/MIN/1.73 M^2
EST. GFR  (NON AFRICAN AMERICAN): 26 ML/MIN/1.73 M^2
GLUCOSE SERPL-MCNC: 355 MG/DL (ref 70–110)
HCT VFR BLD AUTO: 38.6 % (ref 37–48.5)
HGB BLD-MCNC: 12.3 G/DL (ref 12–16)
IMM GRANULOCYTES # BLD AUTO: 0.03 K/UL (ref 0–0.04)
IMM GRANULOCYTES NFR BLD AUTO: 0.4 % (ref 0–0.5)
LYMPHOCYTES # BLD AUTO: 1.6 K/UL (ref 1–4.8)
LYMPHOCYTES NFR BLD: 19.5 % (ref 18–48)
MAGNESIUM SERPL-MCNC: 1.7 MG/DL (ref 1.6–2.6)
MCH RBC QN AUTO: 31.2 PG (ref 27–31)
MCHC RBC AUTO-ENTMCNC: 31.9 G/DL (ref 32–36)
MCV RBC AUTO: 98 FL (ref 82–98)
MONOCYTES # BLD AUTO: 0.8 K/UL (ref 0.3–1)
MONOCYTES NFR BLD: 10.1 % (ref 4–15)
NEUTROPHILS # BLD AUTO: 5.5 K/UL (ref 1.8–7.7)
NEUTROPHILS NFR BLD: 66.6 % (ref 38–73)
NRBC BLD-RTO: 0 /100 WBC
PLATELET # BLD AUTO: 223 K/UL (ref 150–450)
PMV BLD AUTO: 10.3 FL (ref 9.2–12.9)
POCT GLUCOSE: 122 MG/DL (ref 70–110)
POCT GLUCOSE: 126 MG/DL (ref 70–110)
POCT GLUCOSE: 142 MG/DL (ref 70–110)
POCT GLUCOSE: 163 MG/DL (ref 70–110)
POTASSIUM SERPL-SCNC: 4.3 MMOL/L (ref 3.5–5.1)
RBC # BLD AUTO: 3.94 M/UL (ref 4–5.4)
SODIUM SERPL-SCNC: 136 MMOL/L (ref 136–145)
WBC # BLD AUTO: 8.24 K/UL (ref 3.9–12.7)

## 2022-07-13 PROCEDURE — 25000003 PHARM REV CODE 250: Mod: HCNC | Performed by: INTERNAL MEDICINE

## 2022-07-13 PROCEDURE — 93005 ELECTROCARDIOGRAM TRACING: CPT | Mod: HCNC

## 2022-07-13 PROCEDURE — 80048 BASIC METABOLIC PNL TOTAL CA: CPT | Mod: HCNC | Performed by: INTERNAL MEDICINE

## 2022-07-13 PROCEDURE — 83735 ASSAY OF MAGNESIUM: CPT | Mod: HCNC | Performed by: INTERNAL MEDICINE

## 2022-07-13 PROCEDURE — 63600175 PHARM REV CODE 636 W HCPCS: Mod: HCNC | Performed by: STUDENT IN AN ORGANIZED HEALTH CARE EDUCATION/TRAINING PROGRAM

## 2022-07-13 PROCEDURE — 97166 OT EVAL MOD COMPLEX 45 MIN: CPT | Mod: HCNC

## 2022-07-13 PROCEDURE — 25000003 PHARM REV CODE 250: Mod: HCNC | Performed by: FAMILY MEDICINE

## 2022-07-13 PROCEDURE — 99232 PR SUBSEQUENT HOSPITAL CARE,LEVL II: ICD-10-PCS | Mod: HCNC,,, | Performed by: INTERNAL MEDICINE

## 2022-07-13 PROCEDURE — 97161 PT EVAL LOW COMPLEX 20 MIN: CPT | Mod: HCNC

## 2022-07-13 PROCEDURE — 99232 SBSQ HOSP IP/OBS MODERATE 35: CPT | Mod: HCNC,,, | Performed by: INTERNAL MEDICINE

## 2022-07-13 PROCEDURE — 36415 COLL VENOUS BLD VENIPUNCTURE: CPT | Mod: HCNC | Performed by: INTERNAL MEDICINE

## 2022-07-13 PROCEDURE — 85025 COMPLETE CBC W/AUTO DIFF WBC: CPT | Mod: HCNC | Performed by: INTERNAL MEDICINE

## 2022-07-13 PROCEDURE — 20000000 HC ICU ROOM: Mod: HCNC

## 2022-07-13 PROCEDURE — 93010 EKG 12-LEAD: ICD-10-PCS | Mod: HCNC,,, | Performed by: INTERNAL MEDICINE

## 2022-07-13 PROCEDURE — 93010 ELECTROCARDIOGRAM REPORT: CPT | Mod: HCNC,,, | Performed by: INTERNAL MEDICINE

## 2022-07-13 PROCEDURE — 99233 PR SUBSEQUENT HOSPITAL CARE,LEVL III: ICD-10-PCS | Mod: HCNC,,, | Performed by: INTERNAL MEDICINE

## 2022-07-13 PROCEDURE — 25000003 PHARM REV CODE 250: Mod: HCNC | Performed by: SURGERY

## 2022-07-13 PROCEDURE — 99233 SBSQ HOSP IP/OBS HIGH 50: CPT | Mod: HCNC,,, | Performed by: INTERNAL MEDICINE

## 2022-07-13 PROCEDURE — 63600175 PHARM REV CODE 636 W HCPCS: Mod: HCNC | Performed by: FAMILY MEDICINE

## 2022-07-13 RX ORDER — AMIODARONE HYDROCHLORIDE 200 MG/1
200 TABLET ORAL 2 TIMES DAILY
Status: DISCONTINUED | OUTPATIENT
Start: 2022-07-13 | End: 2022-07-14 | Stop reason: HOSPADM

## 2022-07-13 RX ORDER — IPRATROPIUM BROMIDE AND ALBUTEROL SULFATE 2.5; .5 MG/3ML; MG/3ML
3 SOLUTION RESPIRATORY (INHALATION) EVERY 6 HOURS PRN
Status: DISCONTINUED | OUTPATIENT
Start: 2022-07-13 | End: 2022-07-14 | Stop reason: HOSPADM

## 2022-07-13 RX ORDER — SODIUM CHLORIDE, SODIUM LACTATE, POTASSIUM CHLORIDE, CALCIUM CHLORIDE 600; 310; 30; 20 MG/100ML; MG/100ML; MG/100ML; MG/100ML
INJECTION, SOLUTION INTRAVENOUS CONTINUOUS
Status: ACTIVE | OUTPATIENT
Start: 2022-07-13 | End: 2022-07-14

## 2022-07-13 RX ADMIN — MUPIROCIN: 20 OINTMENT TOPICAL at 09:07

## 2022-07-13 RX ADMIN — METOPROLOL TARTRATE 25 MG: 25 TABLET, FILM COATED ORAL at 09:07

## 2022-07-13 RX ADMIN — APIXABAN 2.5 MG: 2.5 TABLET, FILM COATED ORAL at 09:07

## 2022-07-13 RX ADMIN — AMIODARONE HYDROCHLORIDE 200 MG: 200 TABLET ORAL at 09:07

## 2022-07-13 RX ADMIN — AMIODARONE HYDROCHLORIDE 200 MG: 200 TABLET ORAL at 07:07

## 2022-07-13 RX ADMIN — POLYETHYLENE GLYCOL 3350 17 G: 17 POWDER, FOR SOLUTION ORAL at 09:07

## 2022-07-13 RX ADMIN — DILTIAZEM HYDROCHLORIDE 90 MG: 60 TABLET, FILM COATED ORAL at 07:07

## 2022-07-13 RX ADMIN — DILTIAZEM HYDROCHLORIDE 90 MG: 60 TABLET, FILM COATED ORAL at 09:07

## 2022-07-13 RX ADMIN — Medication 10 MG/HR: at 01:07

## 2022-07-13 RX ADMIN — ACETAMINOPHEN 650 MG: 325 TABLET ORAL at 03:07

## 2022-07-13 RX ADMIN — AMIODARONE HYDROCHLORIDE 0.5 MG/MIN: 1.8 INJECTION, SOLUTION INTRAVENOUS at 03:07

## 2022-07-13 RX ADMIN — SENNOSIDES AND DOCUSATE SODIUM 2 TABLET: 50; 8.6 TABLET ORAL at 09:07

## 2022-07-13 RX ADMIN — DILTIAZEM HYDROCHLORIDE 90 MG: 60 TABLET, FILM COATED ORAL at 02:07

## 2022-07-13 RX ADMIN — FAMOTIDINE 20 MG: 20 TABLET ORAL at 09:07

## 2022-07-13 RX ADMIN — SODIUM CHLORIDE, SODIUM LACTATE, POTASSIUM CHLORIDE, AND CALCIUM CHLORIDE: .6; .31; .03; .02 INJECTION, SOLUTION INTRAVENOUS at 02:07

## 2022-07-13 NOTE — PT/OT/SLP EVAL
Physical Therapy Evaluation and Discharge Note    Patient Name:  Shirley Metz   MRN:  [id number]    Recommendations:     Discharge Recommendations:  home health PT   Discharge Equipment Recommendations: none   Barriers to discharge: None    Assessment:     Shirley Metz is a 89 y.o. female admitted with a medical diagnosis of Atrial fibrillation with RVR. .  At this time, patient is functioning at their prior level of function and does not require further acute PT services.     Recent Surgery: * No surgery found *     Plan:     During this hospitalization, patient does not require further acute PT services.  Please re-consult if situation changes.  D/C PT FROM P.T. SERVICES TO [facility] FOR CONT. GAIT TO TOLERANCE    Subjective     Chief Complaint:   Patient/Family Comments/goals:   Pain/Comfort:  · Pain Rating 1: 0/10    Patients cultural, spiritual, Congregational conflicts given the current situation:      Living Environment:  PT LIVES IN [facility], 1ST FLOOR APT, AMB WITH ROLLATOR WALKER, DRIVES, INDEP WITH ADL'S  Prior to admission, patients level of function was CYRIL.  Equipment used at home: shower chair, rollator, grab bar.  DME owned (not currently used): none.  Upon discharge, patient will have assistance from ?.    Objective:     Communicated with NURSE MARKS prior to session.  Patient found ambulatory in room/valles with telemetry, blood pressure cuff, peripheral IV, pulse ox (continuous) upon PT entry to room.    General Precautions: Standard, fall   Orthopedic Precautions:N/A   Braces: N/A   Respiratory Status: Room air    Exams:  · Cognitive Exam:  Patient is oriented to Person, Place, Time and Situation  · Postural Exam:  Patient presented with the following abnormalities:    · -       Rounded shoulders  · Sensation:    · -       Intact  · RLE ROM: WFL  · RLE Strength: WFL  · LLE ROM: WFL  · LLE Strength: WFL    Functional Mobility:  · Bed Mobility:      · Scooting: modified independence  · Transfers:     · Sit to Stand:  modified independence with no AD  · Bed to Chair: modified independence with  4 wheeled walker  using  Step Transfer  · Gait: PT ' WITH ROLLATOR WALKER CYRIL, NO LOB OR SOB ON ROOM AIR  · Balance: GOOD    AM-PAC 6 CLICK MOBILITY  Total Score:21     Therapeutic Activities and Exercises:   PT EDUCATED IN ROLE OF P.T., ENCOURAGED TO INCREASE TIME OOB IN CHAIR, REVIEW ROLLATOR WALKER USE AND SAFETY DURING TF'S AND GAIT, EDUCATED IN BLE THEREX TO PERFORM WHILE SEATED IN CHAIR: HIP FLEX/EXT, LAQ, AP'S    AM-PAC 6 CLICK MOBILITY  Total Score:21     Patient left up in chair with all lines intact, call button in reach, chair alarm on and NURSE notified.    GOALS:   Multidisciplinary Problems     Physical Therapy Goals     Not on file                History:     Past Medical History:   Diagnosis Date    Anemia 11/29/2018    Anxiety 11/28/2017    Arthritis     Atrial fibrillation with RVR 10/9/2019    Back pain     Basal cell carcinoma 03/29/2018    left mid back    Chronic diastolic congestive heart failure 10/15/2019    CKD (chronic kidney disease) stage 3, GFR 30-59 ml/min 8/8/2016    Colon polyps     reported per patient.     Coronary artery calcification 10/5/2021    Fuchs' corneal dystrophy     General anesthetics causing adverse effect in therapeutic use     woke up during surgery and gagged on ET tube    Glaucoma     Glaucoma     Heart failure with preserved left ventricular function (HFpEF) 7/24/2018    Hyperlipidemia     Hypertension     Macular degeneration dry    Obesity     PVD (peripheral vascular disease) 4/26/2021    PVD (peripheral vascular disease) 4/26/2021    Squamous cell carcinoma 01/08/2019    left shoulder    Stenosis of carotid artery 10/5/2021    Trouble in sleeping     Type 2 diabetes mellitus without complication, without long-term current use of insulin 2/24/2021    Urinary tract infection         Past Surgical History:   Procedure Laterality Date    ADENOIDECTOMY  1938    BREAST BIOPSY Left     1997. benign    BREAST CYST EXCISION Left 1997 approx    CARPAL TUNNEL RELEASE Right 2012 approx    CATARACT EXTRACTION Bilateral 1994    CHOLECYSTECTOMY  1975    CORNEAL TRANSPLANT Bilateral 08/2015 8/2015 - left; Right 4/2016    EYE SURGERY      Fuch's Corneal dystrophy - had 2nd operation with Dr. Quintanilla    EYE SURGERY      Cataract wIOL    left thumb Left 2011    removed cuboid for arthritis    REVERSE TOTAL SHOULDER ARTHROPLASTY Left 8/21/2018    Procedure: ARTHROPLASTY, SHOULDER, TOTAL, REVERSE;  Surgeon: Solomon Lujan MD;  Location: [facility] OR;  Service: Orthopedics;  Laterality: Left;  WITH BICEP TENODESIS    SKIN CANCER EXCISION Left 2017    BCC removal by Dr. Valadez    SLT- OS (aka TRABECULOPLASTY) Left 05/11/2011    repeat 3/14/12    TENOTOMY Left 8/21/2018    Procedure: TENOTOMY;  Surgeon: Solomon Lujan MD;  Location: [facility] OR;  Service: Orthopedics;  Laterality: Left;    TONSILLECTOMY  1938    TREATMENT OF CARDIAC ARRHYTHMIA N/A 10/10/2019    Procedure: CARDIOVERSION;  Surgeon: Peet Durán MD;  Location: [facility] CATH LAB;  Service: Cardiology;  Laterality: N/A;    TREATMENT OF CARDIAC ARRHYTHMIA N/A 12/6/2019    Procedure: CARDIOVERSION;  Surgeon: Samy Castillo MD;  Location: [facility] CATH LAB;  Service: Cardiology;  Laterality: N/A;       Time Tracking:     PT Received On: 07/13/22  PT Start Time: 0805     PT Stop Time: 0828  PT Total Time (min): 23 min     Billable Minutes: Evaluation 23 07/13/2022

## 2022-07-13 NOTE — EICU
eICU Physician Virtual/Remote Brief Evaluation Note      Telephone call RN  Requesting home antihypertensives to be ordered  Chart reviewed, discussed with RN  /99, P ranging from 90s to 130s appears to be in atrial flutter, RR 16, O2 sat 93  On amiodarone and Cardizem drips.  Cardizem is maxed  Was on furosemide, hydrochlorothiazide, losartan, metoprolol, clonidine at home  Has not received metoprolol since admission  Metoprolol tartrate 50 mg p.o. now and 25 mg p.o. b.i.d.  If this does not decrease heart rate will rebolus amiodarone        ALEJANDRINA Gaffney MD  [facility] Attending  [phone]    This report has been created through the use of [employer] dictation software. Typographical and content errors may occur with this process. While efforts are made to detect and correct such errors, in some cases errors will persist. For this reason, wording in this document should be considered in the proper context and not strictly verbatim

## 2022-07-13 NOTE — PLAN OF CARE
POC reviewed with pt  Pt AAOOx4; VSS; Afib on monitor  Assumed care of pt at approx 1130 this shift  Amio and cardizem gtt's discontinued early in shift; PO amio and cardizem initiated  LR infusing at 100 ml/hr  Tele transfer orders placed; pt awaiting bed assignment  Accuchecks AC/HS  Bed alarm in use; safety precautions maintained

## 2022-07-13 NOTE — EICU
[facility] Phys[facility]/Remote Brief Evaluation Note      Chest x-ray with mildly increased interstitial markings.  Will not diurese at this time      ALEJANDRINA Gaffney MD  [facility] Attending  [phone]    This report has been created through the use of [employer] dictation software. Typographical and content errors may occur with this process. While efforts are made to detect and correct such errors, in some cases errors will persist. For this reason, wording in this document should be considered in the proper context and not strictly verbatim

## 2022-07-13 NOTE — EICU
eICU Physician Virtual/Remote Brief Evaluation Note      Telephone call RN  Heart rate much improved following metoprolol but patient is now having wheezing  Chart reviewed, discussed with RN  /86, P 97, RR 22, O2 sat 97  Patient with no history of COPD.  BNP for to 89  Echo yesterday with normal LV function with concentric remodeling, EF 60%, normal LV diastolic function.  Normal RV size and function  Will get chest x-ray and start DuoNebs q.6h p.r.n. for wheezing      B. Manjit Gaffney MD  [facility] Attending  [phone]    This report has been created through the use of [employer] dictation software. Typographical and content errors may occur with this process. While efforts are made to detect and correct such errors, in some cases errors will persist. For this reason, wording in this document should be considered in the proper context and not strictly verbatim

## 2022-07-13 NOTE — SUBJECTIVE & OBJECTIVE
Review of Systems   Constitutional:  Negative for chills, fatigue and fever.   HENT: Negative.  Negative for congestion, rhinorrhea, sore throat and trouble swallowing.    Eyes: Negative.  Negative for visual disturbance.   Respiratory: Negative.  Negative for cough, shortness of breath and wheezing.    Cardiovascular:  Positive for palpitations. Negative for chest pain.   Gastrointestinal: Negative.  Negative for abdominal pain, diarrhea, nausea and vomiting.   Endocrine: Negative.    Genitourinary: Negative.  Negative for dysuria and flank pain.   Musculoskeletal: Negative.  Negative for back pain.   Skin: Negative.  Negative for rash.   Allergic/Immunologic: Negative.    Neurological: Negative.  Negative for seizures, speech difficulty, weakness, numbness and headaches.   Hematological: Negative.    Psychiatric/Behavioral: Negative.  Negative for hallucinations. The patient is not nervous/anxious.    All other systems reviewed and are negative.  Objective:     Vital Signs (Most Recent):  Temp: 98 °F (36.7 °C) (07/13/22 1101)  Pulse: 65 (07/13/22 1100)  Resp: 20 (07/13/22 1101)  BP: 128/66 (07/13/22 1101)  SpO2: (!) 94 % (07/13/22 1100)   Vital Signs (24h Range):  Temp:  [97.3 °F (36.3 °C)-98.2 °F (36.8 °C)] 98 °F (36.7 °C)  Pulse:  [] 65  Resp:  [19-46] 20  SpO2:  [93 %-98 %] 94 %  BP: ()/(53-98) 128/66     Weight: 83.5 kg (184 lb)  Body mass index is 31.58 kg/m².    Intake/Output Summary (Last 24 hours) at 7/13/2022 1309  Last data filed at 7/13/2022 1000  Gross per 24 hour   Intake 1530.52 ml   Output 301 ml   Net 1229.52 ml        Physical Exam  Vitals and nursing note reviewed.   Constitutional:       General: She is awake. She is not in acute distress.     Appearance: She is obese. She is not ill-appearing.   HENT:      Head: Normocephalic and atraumatic.      Mouth/Throat:      Mouth: Mucous membranes are moist.   Eyes:      General: No scleral icterus.     Conjunctiva/sclera: Conjunctivae  normal.   Cardiovascular:      Rate and Rhythm: Normal rate. Rhythm irregular.      Heart sounds: No murmur heard.  Pulmonary:      Effort: Pulmonary effort is normal. No respiratory distress.      Breath sounds: Normal breath sounds. No wheezing.   Abdominal:      Palpations: Abdomen is soft.      Tenderness: There is no abdominal tenderness.   Musculoskeletal:         General: No swelling. Normal range of motion.      Cervical back: Normal range of motion and neck supple.   Skin:     General: Skin is warm.      Coloration: Skin is not jaundiced.   Neurological:      General: No focal deficit present.      Mental Status: She is alert and oriented to person, place, and time. Mental status is at baseline.   Psychiatric:         Attention and Perception: Attention normal.         Speech: Speech normal.         Behavior: Behavior is cooperative.       Significant Labs: All pertinent labs within the past 24 hours have been reviewed.    Significant Imaging: I have reviewed all pertinent imaging results/findings within the past 24 hours.

## 2022-07-13 NOTE — PLAN OF CARE
OT eval completed. Sit>stand mod I, functional mobility x400ft with 4WW mod I, step>pivot to bedside chair with 4WW mod I. Recommending HHOT at d/c. Continued acute OT not warranted. D/C OT.

## 2022-07-13 NOTE — HOSPITAL COURSE
7-13-22 HR  afib , echo nml, will change to po meds, needs out follow up EP for evaluation- ablation    7-14-22   Afib with controlled V rate. Pt can be dc on amiodarone, metoprolol, diltiazem and eliquis

## 2022-07-13 NOTE — PROGRESS NOTES
[facility] [place] [facility]  Progress Note    Patient Name: Shirley Metz  MRN: [id number]  Patient Class: IP- Inpatient   Admission Date: 7/11/2022  Length of Stay: 2 days  Attending Physician: Nasrin Christiansen MD  Primary Care Provider: Heather Miller NP        Subjective:     Principal Problem:Atrial fibrillation with RVR        HPI:  Ms. Bettencourt is a elderly 89-year-old  female with PMH significant for atrial fibrillation on apixaban power, diastolic CHF, HTN, was sent to the ED by her PCP for atrial fibrillation with RVR.  Patient has been complaining of palpitations, fatigue.  Denies chest pain or shortness of breath.  In the ED she was found to have HR in the 150s.  Received diltiazem 10 mg IV x2 doses with no significant improvement.  Started on diltiazem drip, titrating.  Cardiology notified.  Patient will be admitted to the ICU.    Admitting diagnosis:  Atrial fibrillation with RVR.      Overview/Hospital Course:  7/12: Afib RVR not responsive to Cardizem overnight, started on amiodarone and cardiology consult pending. Afebrile, FIOR, no acute distress  7/13: Started on PO Lopressor and Cardizem, PO amiodarone with improvement in HR, still in Afib. Stable for floor transfer      Review of Systems   Constitutional:  Negative for chills, fatigue and fever.   HENT: Negative.  Negative for congestion, rhinorrhea, sore throat and trouble swallowing.    Eyes: Negative.  Negative for visual disturbance.   Respiratory: Negative.  Negative for cough, shortness of breath and wheezing.    Cardiovascular:  Positive for palpitations. Negative for chest pain.   Gastrointestinal: Negative.  Negative for abdominal pain, diarrhea, nausea and vomiting.   Endocrine: Negative.    Genitourinary: Negative.  Negative for dysuria and flank pain.   Musculoskeletal: Negative.  Negative for back pain.   Skin: Negative.  Negative for rash.   Allergic/Immunologic: Negative.    Neurological:  Negative.  Negative for seizures, speech difficulty, weakness, numbness and headaches.   Hematological: Negative.    Psychiatric/Behavioral: Negative.  Negative for hallucinations. The patient is not nervous/anxious.    All other systems reviewed and are negative.  Objective:     Vital Signs (Most Recent):  Temp: 98 °F (36.7 °C) (07/13/22 1101)  Pulse: 65 (07/13/22 1100)  Resp: 20 (07/13/22 1101)  BP: 128/66 (07/13/22 1101)  SpO2: (!) 94 % (07/13/22 1100)   Vital Signs (24h Range):  Temp:  [97.3 °F (36.3 °C)-98.2 °F (36.8 °C)] 98 °F (36.7 °C)  Pulse:  [] 65  Resp:  [19-46] 20  SpO2:  [93 %-98 %] 94 %  BP: ()/(53-98) 128/66     Weight: 83.5 kg (184 lb)  Body mass index is 31.58 kg/m².    Intake/Output Summary (Last 24 hours) at 7/13/2022 1309  Last data filed at 7/13/2022 1000  Gross per 24 hour   Intake 1530.52 ml   Output 301 ml   Net 1229.52 ml        Physical Exam  Vitals and nursing note reviewed.   Constitutional:       General: She is awake. She is not in acute distress.     Appearance: She is obese. She is not ill-appearing.   HENT:      Head: Normocephalic and atraumatic.      Mouth/Throat:      Mouth: Mucous membranes are moist.   Eyes:      General: No scleral icterus.     Conjunctiva/sclera: Conjunctivae normal.   Cardiovascular:      Rate and Rhythm: Normal rate. Rhythm irregular.      Heart sounds: No murmur heard.  Pulmonary:      Effort: Pulmonary effort is normal. No respiratory distress.      Breath sounds: Normal breath sounds. No wheezing.   Abdominal:      Palpations: Abdomen is soft.      Tenderness: There is no abdominal tenderness.   Musculoskeletal:         General: No swelling. Normal range of motion.      Cervical back: Normal range of motion and neck supple.   Skin:     General: Skin is warm.      Coloration: Skin is not jaundiced.   Neurological:      General: No focal deficit present.      Mental Status: She is alert and oriented to person, place, and time. Mental status is  at baseline.   Psychiatric:         Attention and Perception: Attention normal.         Speech: Speech normal.         Behavior: Behavior is cooperative.       Significant Labs: All pertinent labs within the past 24 hours have been reviewed.    Significant Imaging: I have reviewed all pertinent imaging results/findings within the past 24 hours.      Assessment/Plan:      * Atrial fibrillation with RVR  Continue PO Lopressor, Cardizem, amiodarone  HR controlled  F/u cards recs      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  -creatinine 1.5, baseline.  Monitor.      Chronic anticoagulation  -continue apixaban      Elevated troponin  Demand ischemia s/t above        VTE Risk Mitigation (From admission, onward)         Ordered     apixaban tablet 2.5 mg  2 times daily         07/11/22 2131     Place sequential compression device  Until discontinued         07/11/22 2131                Discharge Planning   SHAYNA:      Code Status: Full Code   Is the patient medically ready for discharge?:     Reason for patient still in hospital (select all that apply): Patient trending condition, Laboratory test and Consult recommendations  Discharge Plan A: Home Health   Discharge Delays: None known at this time        Critical care time spent on the evaluation and treatment of severe organ dysfunction, review of pertinent labs and imaging studies, discussions with consulting providers and discussions with patient/family: 30 minutes.      Nasrin Christiansen MD  Department of Hospital Medicine   '[address] - Intensive Care ([facility])

## 2022-07-13 NOTE — PROGRESS NOTES
Pt on Amio @0.5 and Cardizem maxed @ 15, pt states she has not taken any of her dio emeds since she has been at the hospital. Pts HR sustaining 120s-140s, sys 130s-160s. Dr. Gaffney notified, Metoprolol 50 mg PO ordered.     ~2200  Pt received Metoprolol, HR and BP responding well. HR now 70s-80s, sys <140.     ~0100  Pt complaining of feeling weird with new onset wheezing. Dr. Gaffney notified, CXR ordered and pt will receive nebulization treatments.     Pt sill on Amio gtts @0.5, Cardizem @5. Pt on room air, in bed, side rails up X3, call light within reach. All safety measures are in place.

## 2022-07-13 NOTE — PT/OT/SLP EVAL
"Occupational Therapy   Evaluation and Discharge Note    Name: Shirley Metz  MRN: [id number]  Admitting Diagnosis:  Atrial fibrillation with RVR   Recent Surgery: * No surgery found *      Recommendations:     Discharge Recommendations: home health OT  Discharge Equipment Recommendations:  none  Barriers to discharge:  None    Assessment:     Shirley Metz is a 89 y.o. female with a medical diagnosis of Atrial fibrillation with RVR. At this time, patient is functioning at their prior level of function and does not require further acute OT services.     Plan:     During this hospitalization, patient does not require further acute OT services.  Please re-consult if situation changes.    · Plan of Care Reviewed with: patient    Subjective     Chief Complaint: Reported "I don't have any tremors."  Patient/Family Comments/goals: none reported    Occupational Profile:  Living Environment: Patient resides at [facility], alone. No steps/stairs to manage.  Previous level of function: Patient was mod I with ADLs and ambulation via 4WW prior to admission. She does not drive.  Roles and Routines: n/a  Equipment Used at home:  shower chair, rollator, grab bar  Assistance upon Discharge: [facility] staff    Pain/Comfort:  · Pain Rating 1: 0/10    Objective:     Communicated with: NurseMia, prior to session.  Patient found up in chair with telemetry, blood pressure cuff, peripheral IV, pulse ox (continuous) upon OT entry to room.    General Precautions: Standard, fall   Orthopedic Precautions:N/A   Braces: N/A  Respiratory Status: Room air     Functional Mobility/Transfers:  · Patient completed Sit <> Stand Transfer with modified independence  with  4 wheeled walker   · Patient completed Bed <> Chair Transfer using Step Transfer technique with modified independence with 4 wheeled walker  · Functional Mobility: Patient completed x400ft functional mobility with 4WW, mod I, to promote OOB " "activity.    Activities of Daily Living:  · Upper Body Dressing: modified independence .  · Toileting: modified independence .    Cognitive/Visual Perceptual:  Cognitive/Psychosocial Skills:     -       Oriented to: Person, Place and Situation   -       Follows Commands/attention:Easily distracted and Follows one-step commands  -       Safety awareness/insight to disability: impaired   -       Mood/Affect/Coping skills/emotional control: Anxious    Physical Exam:  Balance:    -       sitting: WFL, standing: WFL  Upper Extremity Range of Motion:     -       Right Upper Extremity: WFL  -       Left Upper Extremity: WFL  Upper Extremity Strength:    -       Right Upper Extremity: WFL  -       Left Upper Extremity: WFL   Strength:    -       Right Upper Extremity: WFL  -       Left Upper Extremity: WFL   Patient noted to have mild B UE tremor. When inquiring re: tremors new vs chronic patient replied "I don't have any tremors."    [facility] 6 Click ADL:  AMPAC Total Score: 24    Treatment & Education:  Patient educated on role of OT in acute setting and benefits of participated. Educated on benefits of OOB activity and importance of calling for A to transfer back to bed. Stated understanding and in agreement with POC.  Education:    Patient left up in chair with all lines intact, call button in reach, chair alarm on and nurse notified    History:     Past Medical History:   Diagnosis Date    Anemia 11/29/2018    Anxiety 11/28/2017    Arthritis     Atrial fibrillation with RVR 10/9/2019    Back pain     Basal cell carcinoma 03/29/2018    left mid back    Chronic diastolic congestive heart failure 10/15/2019    CKD (chronic kidney disease) stage 3, GFR 30-59 ml/min 8/8/2016    Colon polyps     reported per patient.     Coronary artery calcification 10/5/2021    Fuchs' corneal dystrophy     General anesthetics causing adverse effect in therapeutic use     woke up during surgery and gagged on ET tube    " Glaucoma     Glaucoma     Heart failure with preserved left ventricular function (HFpEF) 7/24/2018    Hyperlipidemia     Hypertension     Macular degeneration dry    Obesity     PVD (peripheral vascular disease) 4/26/2021    PVD (peripheral vascular disease) 4/26/2021    Squamous cell carcinoma 01/08/2019    left shoulder    Stenosis of carotid artery 10/5/2021    Trouble in sleeping     Type 2 diabetes mellitus without complication, without long-term current use of insulin 2/24/2021    Urinary tract infection        Past Surgical History:   Procedure Laterality Date    ADENOIDECTOMY  1938    BREAST BIOPSY Left     1997. benign    BREAST CYST EXCISION Left 1997 approx    CARPAL TUNNEL RELEASE Right 2012 approx    CATARACT EXTRACTION Bilateral 1994    CHOLECYSTECTOMY  1975    CORNEAL TRANSPLANT Bilateral 08/2015 8/2015 - left; Right 4/2016    EYE SURGERY      Fuch's Corneal dystrophy - had 2nd operation with Dr. Quintanilla    EYE SURGERY      Cataract wIOL    left thumb Left 2011    removed cuboid for arthritis    REVERSE TOTAL SHOULDER ARTHROPLASTY Left 8/21/2018    Procedure: ARTHROPLASTY, SHOULDER, TOTAL, REVERSE;  Surgeon: Solomon Lujan MD;  Location: [facility] OR;  Service: Orthopedics;  Laterality: Left;  WITH BICEP TENODESIS    SKIN CANCER EXCISION Left 2017    BCC removal by Dr. Valadez    SLT- OS (aka TRABECULOPLASTY) Left 05/11/2011    repeat 3/14/12    TENOTOMY Left 8/21/2018    Procedure: TENOTOMY;  Surgeon: Solomon Lujan MD;  Location: [facility] OR;  Service: Orthopedics;  Laterality: Left;    TONSILLECTOMY  1938    TREATMENT OF CARDIAC ARRHYTHMIA N/A 10/10/2019    Procedure: CARDIOVERSION;  Surgeon: Pete Durán MD;  Location: [facility] CATH LAB;  Service: Cardiology;  Laterality: N/A;    TREATMENT OF CARDIAC ARRHYTHMIA N/A 12/6/2019    Procedure: CARDIOVERSION;  Surgeon: Samy Castillo MD;  Location: [facility] CATH LAB;  Service: Cardiology;  Laterality: N/A;       Time Tracking:      OT Date of Treatment: 07/13/22  OT Start Time: 0750  OT Stop Time: 0815  OT Total Time (min): 25 min    Billable Minutes:Evaluation 25 7/13/2022

## 2022-07-13 NOTE — SUBJECTIVE & OBJECTIVE
Interval History:     Review of Systems   Constitutional: Negative. Negative for weight gain.   HENT: Negative.     Eyes: Negative.    Cardiovascular: Negative.  Negative for chest pain, leg swelling and palpitations.   Respiratory: Negative.  Negative for shortness of breath.    Endocrine: Negative.    Hematologic/Lymphatic: Negative.    Skin: Negative.    Musculoskeletal:  Negative for muscle weakness.   Gastrointestinal: Negative.    Genitourinary: Negative.    Neurological: Negative.  Negative for dizziness.   Psychiatric/Behavioral: Negative.     Allergic/Immunologic: Negative.    Objective:     Vital Signs (Most Recent):  Temp: 97.9 °F (36.6 °C) (07/13/22 0301)  Pulse: 90 (07/13/22 0600)  Resp: (Abnormal) 25 (07/13/22 0600)  BP: 135/70 (07/13/22 0600)  SpO2: 96 % (07/13/22 0600)   Vital Signs (24h Range):  Temp:  [97.5 °F (36.4 °C)-98.2 °F (36.8 °C)] 97.9 °F (36.6 °C)  Pulse:  [] 90  Resp:  [19-46] 25  SpO2:  [93 %-98 %] 96 %  BP: ()/() 135/70     Weight: 83.5 kg (184 lb)  Body mass index is 31.58 kg/m².     SpO2: 96 %  O2 Device (Oxygen Therapy): room air      Intake/Output Summary (Last 24 hours) at 7/13/2022 0719  Last data filed at 7/13/2022 0600  Gross per 24 hour   Intake 1601.69 ml   Output 201 ml   Net 1400.69 ml       Lines/Drains/Airways       Peripheral Intravenous Line       Name Duration         Peripheral IV - Single Lumen 07/11/22 1839 20 G Right Hand 1 day         Peripheral IV - Single Lumen 07/11/22 2108 22 G Left Hand 1 day                    Physical Exam    Significant Labs: All pertinent lab results from the last 24 hours have been reviewed.    Significant Imaging: X-Ray: CXR: X-Ray Chest 1 View (CXR): No results found for this visit on 07/11/22.

## 2022-07-13 NOTE — ASSESSMENT & PLAN NOTE
CXR with LLL opacity.  Pt reports known history of multiple pulm nodules in this area and the following:  Followed by [facility] pulmonary  Prior biopsy was non-diagnostic.  (Report not available to me.)  She is a never smoker, but had 2nd hand exposure from her  who was a smoker.  She has been told it is likely atypical infection vs neoplasm.  Receives surveillance with serial CT scans.    Innumerable bilateral pulmonary nodule noted on CT in our system in April 2021

## 2022-07-13 NOTE — PROGRESS NOTES
[facility] ([facility])  Critical Care Medicine  Progress Note    Patient Name: Shirley Metz  MRN: [id number]  Admission Date: 7/11/2022  Hospital Length of Stay: 2 days  Code Status: Full Code  Attending Provider: Nasrin Christiansen MD  Primary Care Provider: Heather Miller NP   Principal Problem: Atrial fibrillation with RVR    Subjective:     HPI:  89W pmh A- fib on Eliquis, previous cardioversion 2019, chronic HFpEF is admitted with Afib/RVR.    Patient was sent from her PCP clinic with afib RVR.  She states she went to the PCP for elevated HR that was discovered during home HR monitoring.  She was asymptomatic.       In ED she was given diltiazem boluses and drip and did not respond.  She was subsequently started on amiodarone again without response.  She has a history of being intolerant to B-blockers.    She does not have chest pain, SOB, jaw pain, LUE pain, abdominal pain, nausea, vomiting, diarrhea, fevers, sweats, chills.    BNP was 289 and troponin 0.086.   Echo is pending.          Hospital/ICU Course:  7/12:  Afib RVR. On Amio.  Diltiazem added in afternoon.  Cardiology consulting.  7/13: Remains in afib.  Metoprolol PO added last night.  Also, remained on amio and dilt drips.  HR controlled.  Converted to PO meds.      Interval History:  States she had some SOB last night.  CXR with pulm edema and density in LLL.  Patient reports aknown history of nodules in LLL followed by [facility].  Previsouly had biopsy that was non-diagnostic and being monitored with serial CT scans.    Started on metop PO last night.  Denies CP, palpitations, SOB.  Also denies HA, abd pain, nausea, vomiting, diarrhea, fevers, sweats, chills.    Objective:     Vital Signs (Most Recent):  Temp: 97.9 °F (36.6 °C) (07/13/22 0301)  Pulse: 90 (07/13/22 0600)  Resp: (!) 25 (07/13/22 0600)  BP: 135/70 (07/13/22 0600)  SpO2: 96 % (07/13/22 0600)   Vital Signs (24h Range):  Temp:  [97.6 °F (36.4 °C)-98.2 °F (36.8 °C)]  97.9 °F (36.6 °C)  Pulse:  [] 90  Resp:  [19-46] 25  SpO2:  [93 %-98 %] 96 %  BP: ()/() 135/70     Weight: 83.5 kg (184 lb)  Body mass index is 31.58 kg/m².      Intake/Output Summary (Last 24 hours) at 7/13/2022 0758  Last data filed at 7/13/2022 0600  Gross per 24 hour   Intake 1601.69 ml   Output 201 ml   Net 1400.69 ml       Physical Exam  Vitals reviewed.   Constitutional:       Appearance: Normal appearance.   HENT:      Head: Normocephalic and atraumatic.      Nose: Nose normal.   Eyes:      Extraocular Movements: Extraocular movements intact.      Pupils: Pupils are equal, round, and reactive to light.   Cardiovascular:      Rate and Rhythm: Tachycardia present. Rhythm irregular.   Pulmonary:      Effort: Pulmonary effort is normal. No respiratory distress.      Comments: Symmetric chest rise.  Abdominal:      General: Abdomen is flat. There is no distension.      Palpations: Abdomen is soft.   Musculoskeletal:         General: No deformity or signs of injury.      Right lower leg: No edema.      Left lower leg: No edema.   Skin:     General: Skin is warm and dry.   Neurological:      General: No focal deficit present.      Mental Status: She is alert and oriented to person, place, and time.       Vents:       Lines/Drains/Airways       Peripheral Intravenous Line  Duration                  Peripheral IV - Single Lumen 07/11/22 1839 20 G Right Hand 1 day         Peripheral IV - Single Lumen 07/11/22 2108 22 G Left Hand 1 day                    Significant Labs:    CBC/Anemia Profile:  Recent Labs   Lab 07/11/22  1842 07/12/22  0405 07/13/22  0328   WBC 8.50 7.89 8.24   HGB 12.9 13.1 12.3   HCT 38.2 40.3 38.6    230 223   MCV 96 97 98   RDW 12.3 12.1 12.3        Chemistries:  Recent Labs   Lab 07/11/22  1842 07/12/22  0404 07/13/22  0328    143 136   K 4.2 3.9 4.3    104 97   CO2 25 24 22*   BUN 37* 36* 33*   CREATININE 1.5* 1.6* 1.7*   CALCIUM 10.0 10.0 8.9   ALBUMIN 3.8  3.8  --    PROT 7.5 7.6  --    BILITOT 0.3 0.3  --    ALKPHOS 63 66  --    ALT 13 13  --    AST 15 15  --    MG  --  1.8 1.7       A1C: No results for input(s): HGBA1C in the last 48 hours.  ABGs: No results for input(s): PH, PCO2, HCO3, POCSATURATED, BE in the last 48 hours.  Lactic Acid: No results for input(s): LACTATE in the last 48 hours.  Troponin:   Recent Labs   Lab 07/11/22  1842 07/12/22  1015   TROPONINI 0.086* 0.091*     Urine Studies:   Recent Labs   Lab 07/11/22  2118   COLORU Yellow   APPEARANCEUA Clear   PHUR 7.0   SPECGRAV 1.015   PROTEINUA Negative   GLUCUA Negative   KETONESU Negative   BILIRUBINUA Negative   OCCULTUA Trace*   NITRITE Negative   UROBILINOGEN Negative   LEUKOCYTESUR Trace*   RBCUA 4   WBCUA 3   BACTERIA Rare       Significant Imaging:   I have reviewed all pertinent imaging results/findings within the past 24 hours.      ABG  No results for input(s): PH, PO2, PCO2, HCO3, BE in the last 168 hours.  Assessment/Plan:     Pulmonary  Pulmonary nodules/lesions, multiple  CXR with LLL opacity.  Pt reports known history of multiple pulm nodules in this area and the following:  Followed by [facility]  Prior biopsy was non-diagnostic.  (Report not available to me.)  She is a never smoker, but had 2nd hand exposure from her  who was a smoker.  She has been told it is likely atypical infection vs neoplasm.  Receives surveillance with serial CT scans.    Innumerable bilateral pulmonary nodule noted on CT in our system in April 2021    Cardiac/Vascular  * Atrial fibrillation with RVR  Has not responded to diltiazem or amiodarone.  Has been intolerant to BBs in the past  Dig likely not a good choice in setting of CKD.    May need cardioversion.  Not unstable at this time.  Echo obtained with report pending.  Trend troponins; though no CP.  Cardiology consult pending.      Elevated troponin  No CP  Likely type 2 in nature  Trend    Renal/  Elevated serum creatinine  Baseline Cr  1.4-1.8.  This reflects CKD    Hematology  Chronic anticoagulation  Continue apixaban      Critical Care Daily Checklist:     A: Awake: RASS Goal/Actual Goal: RASS Goal: 0-->alert and calm  Actual: Velasco Agitation Sedation Scale (RASS): Alert and calm   B: Spontaneous Breathing Trial Performed?    C: SAT & SBT Coordinated?  Not intubated                  D: Delirium: CAM-ICU Overall CAM-ICU: Negative   E: Early Mobility Performed? Will perform   F: Feeding Goal:    Status:         Current Diet Order   Procedures    Diet Cardiac       AS: Analgesia/Sedation PRN acetaminophen   T: Thromboembolic Prophylaxis apixaban   H: HOB > 300 Yes   U: Stress Ulcer Prophylaxis (if needed) pepcid   G: Glucose Control Insulin subcu   B: Bowel Function Stool Occurrence: 1   I: Indwelling Catheter (Lines & Reyes) Necessity None   D: De-escalation of Antimicrobials/Pharmacotherapies None     Plan for the day/ETD Amio, dilt, metoprolol  Cardiology on board     Code Status:  Family/Goals of Care: Prior         Non-Critical Care Time: 60 minutes         Alexandru Rios MD  Critical Care Medicine  [facility] ([facility])

## 2022-07-13 NOTE — PROGRESS NOTES
[facility] ([facility])  Cardiology  Progress Note    Patient Name: Shirley Metz  MRN: [id number]  Admission Date: 7/11/2022  Hospital Length of Stay: 2 days  Code Status: Full Code   Attending Physician: Nasrin Christiansen MD   Primary Care Physician: Heather Miller NP  Expected Discharge Date:   Principal Problem:Atrial fibrillation with RVR    Subjective:     Hospital Course:   7-18-22 HR  afib , echo nml, will change to po meds, needs out follow up EP for evaluation- ablation      Interval History:     Review of Systems   Constitutional: Negative. Negative for weight gain.   HENT: Negative.     Eyes: Negative.    Cardiovascular: Negative.  Negative for chest pain, leg swelling and palpitations.   Respiratory: Negative.  Negative for shortness of breath.    Endocrine: Negative.    Hematologic/Lymphatic: Negative.    Skin: Negative.    Musculoskeletal:  Negative for muscle weakness.   Gastrointestinal: Negative.    Genitourinary: Negative.    Neurological: Negative.  Negative for dizziness.   Psychiatric/Behavioral: Negative.     Allergic/Immunologic: Negative.    Objective:     Vital Signs (Most Recent):  Temp: 97.9 °F (36.6 °C) (07/13/22 0301)  Pulse: 90 (07/13/22 0600)  Resp: (Abnormal) 25 (07/13/22 0600)  BP: 135/70 (07/13/22 0600)  SpO2: 96 % (07/13/22 0600)   Vital Signs (24h Range):  Temp:  [97.5 °F (36.4 °C)-98.2 °F (36.8 °C)] 97.9 °F (36.6 °C)  Pulse:  [] 90  Resp:  [19-46] 25  SpO2:  [93 %-98 %] 96 %  BP: ()/() 135/70     Weight: 83.5 kg (184 lb)  Body mass index is 31.58 kg/m².     SpO2: 96 %  O2 Device (Oxygen Therapy): room air      Intake/Output Summary (Last 24 hours) at 7/13/2022 0719  Last data filed at 7/13/2022 0600  Gross per 24 hour   Intake 1601.69 ml   Output 201 ml   Net 1400.69 ml       Lines/Drains/Airways       Peripheral Intravenous Line       Name Duration         Peripheral IV - Single Lumen 07/11/22 1839 20 G Right Hand 1 day          Peripheral IV - Single Lumen 07/11/22 2108 22 G Left Hand 1 day                    Physical Exam    Significant Labs: All pertinent lab results from the last 24 hours have been reviewed.    Significant Imaging: X-Ray: CXR: X-Ray Chest 1 View (CXR): No results found for this visit on 07/11/22.    Assessment and Plan:     Brief HPI:     * Atrial fibrillation with RVR  89-year-old  female with PMH significant for atrial fibrillation on apixaban power, diastolic CHF, HTN, was sent to the ED by her PCP for atrial fibrillation with RVR.  Patient has been complaining of palpitations, fatigue. Pt started initially on IV cardizem but dc and is on IV amiodarone.  Pt with hx of cardioversions x 2    Restart Iv cardizem, continue elquis  If does not respond DCCV/cardioversion        VTE Risk Mitigation (From admission, onward)         Ordered     apixaban tablet 2.5 mg  2 times daily         07/11/22 2131     Place sequential compression device  Until discontinued         07/11/22 2131                Rafa Samson MD  Cardiology  [facility] ([facility])

## 2022-07-14 VITALS
SYSTOLIC BLOOD PRESSURE: 143 MMHG | RESPIRATION RATE: 28 BRPM | WEIGHT: 184 LBS | HEART RATE: 81 BPM | DIASTOLIC BLOOD PRESSURE: 70 MMHG | OXYGEN SATURATION: 96 % | BODY MASS INDEX: 31.41 KG/M2 | TEMPERATURE: 98 F | HEIGHT: 64 IN

## 2022-07-14 LAB
ANION GAP SERPL CALC-SCNC: 13 MMOL/L (ref 8–16)
BASOPHILS # BLD AUTO: 0.06 K/UL (ref 0–0.2)
BASOPHILS NFR BLD: 0.7 % (ref 0–1.9)
BUN SERPL-MCNC: 36 MG/DL (ref 8–23)
CALCIUM SERPL-MCNC: 9.1 MG/DL (ref 8.7–10.5)
CHLORIDE SERPL-SCNC: 104 MMOL/L (ref 95–110)
CO2 SERPL-SCNC: 23 MMOL/L (ref 23–29)
CREAT SERPL-MCNC: 1.7 MG/DL (ref 0.5–1.4)
DIFFERENTIAL METHOD: ABNORMAL
EOSINOPHIL # BLD AUTO: 0.2 K/UL (ref 0–0.5)
EOSINOPHIL NFR BLD: 2.5 % (ref 0–8)
ERYTHROCYTE [DISTWIDTH] IN BLOOD BY AUTOMATED COUNT: 12.3 % (ref 11.5–14.5)
EST. GFR  (AFRICAN AMERICAN): 30 ML/MIN/1.73 M^2
EST. GFR  (NON AFRICAN AMERICAN): 26 ML/MIN/1.73 M^2
GLUCOSE SERPL-MCNC: 160 MG/DL (ref 70–110)
HCT VFR BLD AUTO: 36.5 % (ref 37–48.5)
HGB BLD-MCNC: 11.8 G/DL (ref 12–16)
IMM GRANULOCYTES # BLD AUTO: 0.02 K/UL (ref 0–0.04)
IMM GRANULOCYTES NFR BLD AUTO: 0.2 % (ref 0–0.5)
LYMPHOCYTES # BLD AUTO: 2.1 K/UL (ref 1–4.8)
LYMPHOCYTES NFR BLD: 25.6 % (ref 18–48)
MAGNESIUM SERPL-MCNC: 1.9 MG/DL (ref 1.6–2.6)
MCH RBC QN AUTO: 32 PG (ref 27–31)
MCHC RBC AUTO-ENTMCNC: 32.3 G/DL (ref 32–36)
MCV RBC AUTO: 99 FL (ref 82–98)
MONOCYTES # BLD AUTO: 1 K/UL (ref 0.3–1)
MONOCYTES NFR BLD: 11.7 % (ref 4–15)
NEUTROPHILS # BLD AUTO: 4.8 K/UL (ref 1.8–7.7)
NEUTROPHILS NFR BLD: 59.3 % (ref 38–73)
NRBC BLD-RTO: 0 /100 WBC
PLATELET # BLD AUTO: 201 K/UL (ref 150–450)
PMV BLD AUTO: 10.2 FL (ref 9.2–12.9)
POCT GLUCOSE: 115 MG/DL (ref 70–110)
POTASSIUM SERPL-SCNC: 3.8 MMOL/L (ref 3.5–5.1)
RBC # BLD AUTO: 3.69 M/UL (ref 4–5.4)
SODIUM SERPL-SCNC: 140 MMOL/L (ref 136–145)
WBC # BLD AUTO: 8.12 K/UL (ref 3.9–12.7)

## 2022-07-14 PROCEDURE — 25000003 PHARM REV CODE 250: Mod: HCNC | Performed by: STUDENT IN AN ORGANIZED HEALTH CARE EDUCATION/TRAINING PROGRAM

## 2022-07-14 PROCEDURE — 85025 COMPLETE CBC W/AUTO DIFF WBC: CPT | Mod: HCNC | Performed by: INTERNAL MEDICINE

## 2022-07-14 PROCEDURE — 80048 BASIC METABOLIC PNL TOTAL CA: CPT | Mod: HCNC | Performed by: INTERNAL MEDICINE

## 2022-07-14 PROCEDURE — 25000003 PHARM REV CODE 250: Mod: HCNC | Performed by: SURGERY

## 2022-07-14 PROCEDURE — 25000003 PHARM REV CODE 250: Mod: HCNC | Performed by: INTERNAL MEDICINE

## 2022-07-14 PROCEDURE — 83735 ASSAY OF MAGNESIUM: CPT | Mod: HCNC | Performed by: INTERNAL MEDICINE

## 2022-07-14 PROCEDURE — 99232 SBSQ HOSP IP/OBS MODERATE 35: CPT | Mod: HCNC,,, | Performed by: INTERNAL MEDICINE

## 2022-07-14 PROCEDURE — 25000003 PHARM REV CODE 250: Mod: HCNC | Performed by: NURSE PRACTITIONER

## 2022-07-14 PROCEDURE — 99232 PR SUBSEQUENT HOSPITAL CARE,LEVL II: ICD-10-PCS | Mod: HCNC,,, | Performed by: INTERNAL MEDICINE

## 2022-07-14 PROCEDURE — 36415 COLL VENOUS BLD VENIPUNCTURE: CPT | Mod: HCNC | Performed by: INTERNAL MEDICINE

## 2022-07-14 RX ORDER — ALPRAZOLAM 0.5 MG/1
0.5 TABLET ORAL NIGHTLY PRN
Status: DISCONTINUED | OUTPATIENT
Start: 2022-07-14 | End: 2022-07-14 | Stop reason: HOSPADM

## 2022-07-14 RX ORDER — METOPROLOL TARTRATE 25 MG/1
25 TABLET, FILM COATED ORAL 2 TIMES DAILY
Qty: 60 TABLET | Refills: 11 | Status: SHIPPED | OUTPATIENT
Start: 2022-07-14 | End: 2022-07-19 | Stop reason: SDUPTHER

## 2022-07-14 RX ORDER — LOSARTAN POTASSIUM 25 MG/1
25 TABLET ORAL DAILY
Qty: 180 TABLET | Refills: 1 | Status: SHIPPED | OUTPATIENT
Start: 2022-07-14 | End: 2023-04-03

## 2022-07-14 RX ORDER — AMIODARONE HYDROCHLORIDE 200 MG/1
200 TABLET ORAL 2 TIMES DAILY
Qty: 60 TABLET | Refills: 11 | Status: SHIPPED | OUTPATIENT
Start: 2022-07-14 | End: 2022-07-19 | Stop reason: SDUPTHER

## 2022-07-14 RX ORDER — DILTIAZEM HYDROCHLORIDE 90 MG/1
90 TABLET, FILM COATED ORAL EVERY 8 HOURS
Qty: 90 TABLET | Refills: 11 | Status: SHIPPED | OUTPATIENT
Start: 2022-07-14 | End: 2022-07-19 | Stop reason: SDUPTHER

## 2022-07-14 RX ADMIN — APIXABAN 2.5 MG: 2.5 TABLET, FILM COATED ORAL at 09:07

## 2022-07-14 RX ADMIN — METOPROLOL TARTRATE 25 MG: 25 TABLET, FILM COATED ORAL at 09:07

## 2022-07-14 RX ADMIN — DILTIAZEM HYDROCHLORIDE 90 MG: 60 TABLET, FILM COATED ORAL at 06:07

## 2022-07-14 RX ADMIN — MUPIROCIN: 20 OINTMENT TOPICAL at 09:07

## 2022-07-14 RX ADMIN — AMIODARONE HYDROCHLORIDE 200 MG: 200 TABLET ORAL at 09:07

## 2022-07-14 RX ADMIN — ALPRAZOLAM 0.5 MG: 0.5 TABLET ORAL at 12:07

## 2022-07-14 RX ADMIN — FAMOTIDINE 20 MG: 20 TABLET ORAL at 09:07

## 2022-07-14 NOTE — PLAN OF CARE
[facility] (Hospital)  Discharge Final Note    Primary Care Provider: Heather Miller NP    Expected Discharge Date: 7/14/2022    Final Discharge Note (most recent)     Final Note - 07/14/22 0939        Final Note    Assessment Type Final Discharge Note     Anticipated Discharge Disposition Home or Self Care     Hospital Resources/Appts/Education Provided Appointments scheduled and added to AVS        Post-Acute Status    Discharge Delays None known at this time                 Important Message from Medicare             Contact Info     Heather Miller NP   Specialty: Family Medicine   Relationship: PCP - [address] [place] [address]   Phone: [phone]       Next Steps: Go on 7/22/2022    Instructions: [employer] appt, Hospital Follow Up    Trevon Ramirez Md, MD   Specialty: Cardiology, Internal Medicine   Relationship: Consulting Physician    [address] [place] [address]  [place] [address]   Phone: [phone]       Next Steps: Go on 7/21/2022    Instructions: Hospital Follow Up Dr. Castillo        PCP visit previously scheduled for 7/22/22. Cardiology f/u appt scheduled.

## 2022-07-14 NOTE — DISCHARGE SUMMARY
[facility] - Intensive Care ([facility])  [facility] Medicine  Discharge Summary      Patient Name: Shirley Metz  MRN: [id number]  Patient Class: IP- Inpatient  Admission Date: 7/11/2022  Hospital Length of Stay: 3 days  Discharge Date and Time:  07/14/2022 8:55 AM  Attending Physician: Bessie Christiansen MD   Discharging Provider: Bessie Christiansen MD  Primary Care Provider: Heather Miller NP      HPI:   Ms. Bettencourt is a elderly 89-year-old  female with PMH significant for atrial fibrillation on apixaban power, diastolic CHF, HTN, was sent to the ED by her PCP for atrial fibrillation with RVR.  Patient has been complaining of palpitations, fatigue.  Denies chest pain or shortness of breath.  In the ED she was found to have HR in the 150s.  Received diltiazem 10 mg IV x2 doses with no significant improvement.  Started on diltiazem drip, titrating.  Cardiology notified.  Patient will be admitted to the ICU.    Admitting diagnosis:  Atrial fibrillation with RVR.      * No surgery found *      Hospital Course:   7/12: Afib RVR not responsive to Cardizem overnight, started on amiodarone and cardiology consult pending. Afebrile, FIOR, no acute distress  7/13: Started on PO Lopressor and Cardizem, PO amiodarone with improvement in HR, still in Afib. Stable for floor transfer    Admitted for Afib RVR that improved with PO cardizem, lopressor and amiodarone, transitioned from IV drips. Stable for d/c with cards f/u       Goals of Care Treatment Preferences:  Code Status: Full Code      Consults:   Consults (From admission, onward)        Status Ordering Provider     Inpatient consult to Critical Care Medicine  Once        Provider:  Alexandru Rios MD    Completed BESSIE CHRISTIANSEN     IP consult to case management  Once        Provider:  (Not yet assigned)    Completed LAURA DENNISON     Inpatient consult to Cardiology  Once        Provider:  Amish Knutson MD    Completed STEVAN ZAVALA          * Atrial  fibrillation with RVR  Continue PO Lopressor, Cardizem, amiodarone  HR controlled  F/u cards recs      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  Monitor.      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    Specialty: Family Medicine  Contact information:  [address] [place] [id number]  [phone]             Trevon Ramirez Md, MD Follow up in 1 week(s).    Specialties: Cardiology, Internal Medicine  Contact information:  [address]  [place] [id number] [phone]                       Patient Instructions:      Ambulatory referral/consult to [facility]   Standing Status: Future   Referral Priority: Routine Referral Type: Consultation   Referral Reason: Specialty Services Required   Number of Visits Requested: 1     Diet Cardiac     Activity as tolerated       Significant Diagnostic Studies: Labs: All labs within the past 24 hours have been reviewed    Pending Diagnostic Studies:     None         Medications:  Reconciled Home Medications:      Medication List      START taking these medications    amiodarone 200 MG Tab  Commonly known as: PACERONE  Take 1 tablet (200 mg total) by mouth 2 (two) times daily.     diltiaZEM 90 MG tablet  Commonly known as: CARDIZEM  Take 1 tablet  (90 mg total) by mouth every 8 (eight) hours.        CHANGE how you take these medications    losartan 25 MG tablet  Commonly known as: COZAAR  Take 1 tablet (25 mg total) by mouth once daily.  What changed: when to take this     metoprolol tartrate 25 MG tablet  Commonly known as: LOPRESSOR  Take 1 tablet (25 mg total) by mouth 2 (two) times daily.  What changed: how much to take        CONTINUE taking these medications    acetaminophen 500 MG tablet  Commonly known as: TYLENOL  Take 500 mg by mouth daily as needed. Taking 1 to 3     ALPRAZolam 0.5 MG tablet  Commonly known as: XANAX  Take 1 tablet (0.5 mg total) by mouth nightly as needed for Anxiety.     apixaban 2.5 mg Tab  Commonly known as: ELIQUIS  Take 1 tablet (2.5 mg total) by mouth 2 (two) times daily.     b complex vitamins capsule  Take 1 capsule by mouth once daily.     cholecalciferol (vitamin D3) 50 mcg (2,000 unit) Cap capsule  Commonly known as: VITAMIN D3  Take 1 capsule by mouth once daily.     coenzyme Q10 200 mg capsule  Take 400 mg by mouth once daily.     fish oil-omega-3 fatty acids 300-1,000 mg capsule  Take 2 g by mouth 2 (two) times daily.     furosemide 40 MG tablet  Commonly known as: LASIX  Take 1 tablet (40 mg total) by mouth daily as needed (edema, swelling, fluid).     MIRALAX ORAL  Take 17 g by mouth 2 (two) times a day. Taking BID     mupirocin 2 % ointment  Commonly known as: BACTROBAN  apply to affected area twice a day with Q-tip. Antibiotic ointment.     RHOPRESSA 0.02 % ophthalmic solution  Generic drug: netarsudiL  Place 1 drop into the left eye every evening.     travoprost 0.004 % ophthalmic solution  Commonly known as: TRAVATAN Z  PLACE 1 DROP INTO THE LEFT EYE EVERY EVENING.        STOP taking these medications    cloNIDine 0.1 MG tablet  Commonly known as: CATAPRES     hydroCHLOROthiazide 12.5 MG Tab  Commonly known as: HYDRODIURIL     isosorbide mononitrate 30 MG 24 hr tablet  Commonly known as: IMDUR     loteprednol  0.5 % ophthalmic suspension  Commonly known as: LOTEMAX            Indwelling Lines/Drains at time of discharge:   Lines/Drains/Airways     None                 Time spent on the discharge of patient: 40 minutes      Nasrin Christiansen MD  Department of Hospital Medicine  [facility] - Intensive Care ([facility])

## 2022-07-14 NOTE — NURSING
Patient lying in bed watching TV with no complaints and no distress noted. See flow sheet for further assessment.

## 2022-07-14 NOTE — PROGRESS NOTES
[facility] - Intensive Care ([facility])  Cardiology  Progress Note    Patient Name: Shirley Metz  MRN: [id number]  Admission Date: 7/11/2022  Hospital Length of Stay: 3 days  Code Status: Full Code   Attending Physician: Nasrin Christiansen MD   Primary Care Physician: Heather Miller NP  Expected Discharge Date: 7/14/2022  Principal Problem:Atrial fibrillation with RVR    Subjective:     Hospital Course:   7-13-22 HR  afib , echo nml, will change to po meds, needs out follow up EP for evaluation- ablation    7-14-22   Afib with controlled V rate. Pt can be dc on amiodarone, metoprolol, diltiazem and eliquis      Interval History:     Review of Systems   Constitutional: Negative. Negative for weight gain.   HENT: Negative.     Eyes: Negative.    Cardiovascular: Negative.  Negative for chest pain, leg swelling and palpitations.   Respiratory: Negative.  Negative for shortness of breath.    Endocrine: Negative.    Hematologic/Lymphatic: Negative.    Skin: Negative.    Musculoskeletal:  Negative for muscle weakness.   Gastrointestinal: Negative.    Genitourinary: Negative.    Neurological: Negative.  Negative for dizziness.   Psychiatric/Behavioral: Negative.     Allergic/Immunologic: Negative.    Objective:     Vital Signs (Most Recent):  Temp: 97.6 °F (36.4 °C) (07/14/22 0730)  Pulse: 81 (07/14/22 0730)  Resp: (Abnormal) 28 (07/14/22 0730)  BP: (Abnormal) 143/70 (07/14/22 0730)  SpO2: 96 % (07/14/22 0730)   Vital Signs (24h Range):  Temp:  [97.4 °F (36.3 °C)-98.2 °F (36.8 °C)] 97.6 °F (36.4 °C)  Pulse:  [] 81  Resp:  [28-45] 28  SpO2:  [93 %-97 %] 96 %  BP: (114-160)/(70-89) 143/70     Weight: 83.5 kg (184 lb)  Body mass index is 31.58 kg/m².     SpO2: 96 %  O2 Device (Oxygen Therapy): room air      Intake/Output Summary (Last 24 hours) at 7/14/2022 1112  Last data filed at 7/14/2022 0600  Gross per 24 hour   Intake 1056.09 ml   Output no documentation   Net 1056.09 ml       Lines/Drains/Airways        None                   Physical Exam  Vitals and nursing note reviewed.   Constitutional:       Appearance: She is well-developed.   HENT:      Head: Normocephalic and atraumatic.   Eyes:      Conjunctiva/sclera: Conjunctivae normal.      Pupils: Pupils are equal, round, and reactive to light.   Cardiovascular:      Rate and Rhythm: Normal rate and regular rhythm.      Pulses: Intact distal pulses.      Heart sounds: Normal heart sounds.   Pulmonary:      Effort: Pulmonary effort is normal.      Breath sounds: Normal breath sounds.   Abdominal:      General: Bowel sounds are normal.      Palpations: Abdomen is soft.   Musculoskeletal:         General: Normal range of motion.      Cervical back: Normal range of motion and neck supple.   Skin:     General: Skin is warm and dry.   Neurological:      Mental Status: She is alert and oriented to person, place, and time.       Significant Labs: All pertinent lab results from the last 24 hours have been reviewed.    Significant Imaging: X-Ray: CXR: X-Ray Chest 1 View (CXR): No results found for this visit on 07/11/22.    Assessment and Plan:     Brief HPI:     * Atrial fibrillation with RVR  89-year-old  female with PMH significant for atrial fibrillation on apixaban power, diastolic CHF, HTN, was sent to the ED by her PCP for atrial fibrillation with RVR.  Patient has been complaining of palpitations, fatigue. Pt started initially on IV cardizem but dc and is on IV amiodarone.  Pt with hx of cardioversions x 2    Restart Iv cardizem, continue elquis  If does not respond DCCV/cardioversion        VTE Risk Mitigation (From admission, onward)         Ordered     apixaban tablet 2.5 mg  2 times daily         07/11/22 2131     Place sequential compression device  Until discontinued         07/11/22 2131                Rafa Samson MD  Cardiology  [facility] - [facility] ([facility])

## 2022-07-14 NOTE — PLAN OF CARE
Problem: Adult Inpatient Plan of Care  Goal: Plan of Care Review  7/14/2022 0632 by Gina Tripathi RN  Outcome: Ongoing, Progressing  7/14/2022 0631 by Gina Tripathi RN  Outcome: Ongoing, Progressing  Goal: Patient-Specific Goal (Individualized)  7/14/2022 0632 by Gina Tripathi RN  Outcome: Ongoing, Progressing  7/14/2022 0631 by Gina Tripathi RN  Outcome: Ongoing, Progressing  Goal: Absence of Hospital-Acquired Illness or Injury  7/14/2022 0632 by Gina Tripathi RN  Outcome: Ongoing, Progressing  7/14/2022 0631 by Gina Tripathi RN  Outcome: Ongoing, Progressing  Goal: Optimal Comfort and Wellbeing  7/14/2022 0632 by Gina Tripathi RN  Outcome: Ongoing, Progressing  7/14/2022 0631 by Gina Tripathi RN  Outcome: Ongoing, Progressing  Goal: Readiness for Transition of Care  7/14/2022 0632 by iGna Tripathi RN  Outcome: Ongoing, Progressing  7/14/2022 0631 by Gina Tripathi RN  Outcome: Ongoing, Progressing

## 2022-07-14 NOTE — SUBJECTIVE & OBJECTIVE
Interval History:     Review of Systems   Constitutional: Negative. Negative for weight gain.   HENT: Negative.     Eyes: Negative.    Cardiovascular: Negative.  Negative for chest pain, leg swelling and palpitations.   Respiratory: Negative.  Negative for shortness of breath.    Endocrine: Negative.    Hematologic/Lymphatic: Negative.    Skin: Negative.    Musculoskeletal:  Negative for muscle weakness.   Gastrointestinal: Negative.    Genitourinary: Negative.    Neurological: Negative.  Negative for dizziness.   Psychiatric/Behavioral: Negative.     Allergic/Immunologic: Negative.    Objective:     Vital Signs (Most Recent):  Temp: 97.6 °F (36.4 °C) (07/14/22 0730)  Pulse: 81 (07/14/22 0730)  Resp: (Abnormal) 28 (07/14/22 0730)  BP: (Abnormal) 143/70 (07/14/22 0730)  SpO2: 96 % (07/14/22 0730)   Vital Signs (24h Range):  Temp:  [97.4 °F (36.3 °C)-98.2 °F (36.8 °C)] 97.6 °F (36.4 °C)  Pulse:  [] 81  Resp:  [28-45] 28  SpO2:  [93 %-97 %] 96 %  BP: (114-160)/(70-89) 143/70     Weight: 83.5 kg (184 lb)  Body mass index is 31.58 kg/m².     SpO2: 96 %  O2 Device (Oxygen Therapy): room air      Intake/Output Summary (Last 24 hours) at 7/14/2022 1112  Last data filed at 7/14/2022 0600  Gross per 24 hour   Intake 1056.09 ml   Output no documentation   Net 1056.09 ml       Lines/Drains/Airways       None                   Physical Exam  Vitals and nursing note reviewed.   Constitutional:       Appearance: She is well-developed.   HENT:      Head: Normocephalic and atraumatic.   Eyes:      Conjunctiva/sclera: Conjunctivae normal.      Pupils: Pupils are equal, round, and reactive to light.   Cardiovascular:      Rate and Rhythm: Normal rate and regular rhythm.      Pulses: Intact distal pulses.      Heart sounds: Normal heart sounds.   Pulmonary:      Effort: Pulmonary effort is normal.      Breath sounds: Normal breath sounds.   Abdominal:      General: Bowel sounds are normal.      Palpations: Abdomen is soft.    Musculoskeletal:         General: Normal range of motion.      Cervical back: Normal range of motion and neck supple.   Skin:     General: Skin is warm and dry.   Neurological:      Mental Status: She is alert and oriented to person, place, and time.       Significant Labs: All pertinent lab results from the last 24 hours have been reviewed.    Significant Imaging: X-Ray: CXR: X-Ray Chest 1 View (CXR): No results found for this visit on 07/11/22.

## 2022-07-14 NOTE — PROGRESS NOTES
AVS reviewed with pt; pt verbalizes understanding  PIV's and monitoring equipment removed  Pt awaiting transportation

## 2022-07-15 ENCOUNTER — TELEPHONE (OUTPATIENT)
Dept: CARDIOLOGY | Facility: CLINIC | Age: 87
End: 2022-07-15
Payer: MEDICARE

## 2022-07-15 NOTE — TELEPHONE ENCOUNTER
----- Message from Madeline Cesar sent at 7/15/2022  7:46 AM CDT -----  Contact: PT  .Type:  Same Day Appointment Request    Caller is requesting a same day appointment.  Caller declined first available appointment listed below.    Name of Caller: Shirley Diaz is the first available appointment 08/02/2022    Symptoms: hosp f/u    Best Call Back Number:  [phone] (home) [phone]          Additional Information:  pt rxs were messed up/ recvd rx of something that she was not supposed to take

## 2022-07-19 ENCOUNTER — PATIENT MESSAGE (OUTPATIENT)
Dept: PULMONOLOGY | Facility: CLINIC | Age: 87
End: 2022-07-19
Payer: MEDICARE

## 2022-07-19 ENCOUNTER — OFFICE VISIT (OUTPATIENT)
Dept: CARDIOLOGY | Facility: CLINIC | Age: 87
End: 2022-07-19
Payer: COMMERCIAL

## 2022-07-19 ENCOUNTER — OFFICE VISIT (OUTPATIENT)
Dept: OPHTHALMOLOGY | Facility: CLINIC | Age: 87
End: 2022-07-19
Payer: COMMERCIAL

## 2022-07-19 VITALS
DIASTOLIC BLOOD PRESSURE: 60 MMHG | OXYGEN SATURATION: 95 % | WEIGHT: 187.63 LBS | HEIGHT: 64 IN | BODY MASS INDEX: 32.03 KG/M2 | HEART RATE: 95 BPM | SYSTOLIC BLOOD PRESSURE: 137 MMHG

## 2022-07-19 DIAGNOSIS — I49.3 PVC'S (PREMATURE VENTRICULAR CONTRACTIONS): ICD-10-CM

## 2022-07-19 DIAGNOSIS — I65.23 CAROTID ARTERY CALCIFICATION, BILATERAL: ICD-10-CM

## 2022-07-19 DIAGNOSIS — M35.01 KERATITIS SICCA, BILATERAL: ICD-10-CM

## 2022-07-19 DIAGNOSIS — I70.0 CALCIFICATION OF AORTA: ICD-10-CM

## 2022-07-19 DIAGNOSIS — I65.29 STENOSIS OF CAROTID ARTERY, UNSPECIFIED LATERALITY: ICD-10-CM

## 2022-07-19 DIAGNOSIS — I13.2 HYPERTENSIVE HEART AND RENAL DISEASE WITH BOTH (CONGESTIVE) HEART FAILURE AND RENAL FAILURE: ICD-10-CM

## 2022-07-19 DIAGNOSIS — I73.9 PAD (PERIPHERAL ARTERY DISEASE): ICD-10-CM

## 2022-07-19 DIAGNOSIS — G47.30 SLEEP APNEA, UNSPECIFIED TYPE: ICD-10-CM

## 2022-07-19 DIAGNOSIS — I11.9 HYPERTENSIVE LEFT VENTRICULAR HYPERTROPHY, WITHOUT HEART FAILURE: ICD-10-CM

## 2022-07-19 DIAGNOSIS — E78.2 MIXED HYPERLIPIDEMIA: ICD-10-CM

## 2022-07-19 DIAGNOSIS — N19 HYPERTENSIVE HEART AND RENAL DISEASE WITH BOTH (CONGESTIVE) HEART FAILURE AND RENAL FAILURE: ICD-10-CM

## 2022-07-19 DIAGNOSIS — Z94.7 HISTORY OF CORNEA TRANSPLANT: ICD-10-CM

## 2022-07-19 DIAGNOSIS — Z96.1 PSEUDOPHAKIA: ICD-10-CM

## 2022-07-19 DIAGNOSIS — I48.0 PAF (PAROXYSMAL ATRIAL FIBRILLATION): ICD-10-CM

## 2022-07-19 DIAGNOSIS — H40.1131 PRIMARY OPEN ANGLE GLAUCOMA (POAG) OF BOTH EYES, MILD STAGE: Primary | ICD-10-CM

## 2022-07-19 DIAGNOSIS — H52.7 REFRACTIVE ERROR: ICD-10-CM

## 2022-07-19 DIAGNOSIS — I10 PRIMARY HYPERTENSION: Primary | ICD-10-CM

## 2022-07-19 PROCEDURE — 1111F DSCHRG MED/CURRENT MED MERGE: CPT | Mod: HCNC,CPTII,S$GLB, | Performed by: INTERNAL MEDICINE

## 2022-07-19 PROCEDURE — 1159F MED LIST DOCD IN RCRD: CPT | Mod: HCNC,CPTII,S$GLB, | Performed by: OPHTHALMOLOGY

## 2022-07-19 PROCEDURE — 99214 OFFICE O/P EST MOD 30 MIN: CPT | Mod: HCNC,S$GLB,, | Performed by: INTERNAL MEDICINE

## 2022-07-19 PROCEDURE — 1159F MED LIST DOCD IN RCRD: CPT | Mod: HCNC,CPTII,S$GLB, | Performed by: INTERNAL MEDICINE

## 2022-07-19 PROCEDURE — 1160F RVW MEDS BY RX/DR IN RCRD: CPT | Mod: HCNC,CPTII,S$GLB, | Performed by: INTERNAL MEDICINE

## 2022-07-19 PROCEDURE — 99999 PR PBB SHADOW E&M-EST. PATIENT-LVL V: ICD-10-PCS | Mod: PBBFAC,HCNC,, | Performed by: INTERNAL MEDICINE

## 2022-07-19 PROCEDURE — 1126F PR PAIN SEVERITY QUANTIFIED, NO PAIN PRESENT: ICD-10-PCS | Mod: HCNC,CPTII,S$GLB, | Performed by: INTERNAL MEDICINE

## 2022-07-19 PROCEDURE — 1111F PR DISCHARGE MEDS RECONCILED W/ CURRENT OUTPATIENT MED LIST: ICD-10-PCS | Mod: HCNC,CPTII,S$GLB, | Performed by: OPHTHALMOLOGY

## 2022-07-19 PROCEDURE — 99214 PR OFFICE/OUTPT VISIT, EST, LEVL IV, 30-39 MIN: ICD-10-PCS | Mod: HCNC,S$GLB,, | Performed by: INTERNAL MEDICINE

## 2022-07-19 PROCEDURE — 1159F PR MEDICATION LIST DOCUMENTED IN MEDICAL RECORD: ICD-10-PCS | Mod: HCNC,CPTII,S$GLB, | Performed by: INTERNAL MEDICINE

## 2022-07-19 PROCEDURE — 99214 PR OFFICE/OUTPT VISIT, EST, LEVL IV, 30-39 MIN: ICD-10-PCS | Mod: HCNC,S$GLB,, | Performed by: OPHTHALMOLOGY

## 2022-07-19 PROCEDURE — 99999 PR PBB SHADOW E&M-EST. PATIENT-LVL III: CPT | Mod: PBBFAC,HCNC,, | Performed by: OPHTHALMOLOGY

## 2022-07-19 PROCEDURE — 99999 PR PBB SHADOW E&M-EST. PATIENT-LVL V: CPT | Mod: PBBFAC,HCNC,, | Performed by: INTERNAL MEDICINE

## 2022-07-19 PROCEDURE — 1160F PR REVIEW ALL MEDS BY PRESCRIBER/CLIN PHARMACIST DOCUMENTED: ICD-10-PCS | Mod: HCNC,CPTII,S$GLB, | Performed by: OPHTHALMOLOGY

## 2022-07-19 PROCEDURE — 1160F RVW MEDS BY RX/DR IN RCRD: CPT | Mod: HCNC,CPTII,S$GLB, | Performed by: OPHTHALMOLOGY

## 2022-07-19 PROCEDURE — 1159F PR MEDICATION LIST DOCUMENTED IN MEDICAL RECORD: ICD-10-PCS | Mod: HCNC,CPTII,S$GLB, | Performed by: OPHTHALMOLOGY

## 2022-07-19 PROCEDURE — 1111F PR DISCHARGE MEDS RECONCILED W/ CURRENT OUTPATIENT MED LIST: ICD-10-PCS | Mod: HCNC,CPTII,S$GLB, | Performed by: INTERNAL MEDICINE

## 2022-07-19 PROCEDURE — 99999 PR PBB SHADOW E&M-EST. PATIENT-LVL III: ICD-10-PCS | Mod: PBBFAC,HCNC,, | Performed by: OPHTHALMOLOGY

## 2022-07-19 PROCEDURE — 1126F AMNT PAIN NOTED NONE PRSNT: CPT | Mod: HCNC,CPTII,S$GLB, | Performed by: INTERNAL MEDICINE

## 2022-07-19 PROCEDURE — 99214 OFFICE O/P EST MOD 30 MIN: CPT | Mod: HCNC,S$GLB,, | Performed by: OPHTHALMOLOGY

## 2022-07-19 PROCEDURE — 1111F DSCHRG MED/CURRENT MED MERGE: CPT | Mod: HCNC,CPTII,S$GLB, | Performed by: OPHTHALMOLOGY

## 2022-07-19 PROCEDURE — 1160F PR REVIEW ALL MEDS BY PRESCRIBER/CLIN PHARMACIST DOCUMENTED: ICD-10-PCS | Mod: HCNC,CPTII,S$GLB, | Performed by: INTERNAL MEDICINE

## 2022-07-19 RX ORDER — AMIODARONE HYDROCHLORIDE 200 MG/1
200 TABLET ORAL 2 TIMES DAILY
Qty: 60 TABLET | Refills: 1 | Status: SHIPPED | OUTPATIENT
Start: 2022-07-19 | End: 2022-09-02 | Stop reason: SDUPTHER

## 2022-07-19 RX ORDER — DILTIAZEM HYDROCHLORIDE 60 MG/1
60 TABLET, FILM COATED ORAL EVERY 8 HOURS
Qty: 270 TABLET | Refills: 1 | Status: SHIPPED | OUTPATIENT
Start: 2022-07-19 | End: 2022-09-29 | Stop reason: SDUPTHER

## 2022-07-19 RX ORDER — METOPROLOL TARTRATE 25 MG/1
25 TABLET, FILM COATED ORAL 2 TIMES DAILY
Qty: 180 TABLET | Refills: 1 | Status: SHIPPED | OUTPATIENT
Start: 2022-07-19 | End: 2023-01-05

## 2022-07-19 NOTE — PROGRESS NOTES
Subjective:   Patient ID:  Shirley Metz is a 89 y.o. female who presents for cardiac consult of Hospital Follow Up      Shortness of Breath  Pertinent negatives include no leg swelling or rash.   Fatigue  Associated symptoms include fatigue. Pertinent negatives include no rash.   Hypertension  Associated symptoms include malaise/fatigue and shortness of breath.     The patient came in today for cardiac consult of Hospital Follow Up    Shirley Metz is a 89 y.o. female pt with HTN, PAF on Eliquis, PVD, carotid artery disease,  HFpEF, preDM,  hyperlipidemia, palpitations, CKD presents today for follow-up CV eval.    5/1/18  She has significant side effects from many BP meds, could not tolerate Verapamil recently. BP has improved, but sometimes BP gets too low - occasionally 119/53.   She thinks her BP is too low under 130 systolic and wants to decrease some meds. Discussed we can half the clonidine patch and monitor. Will continue other meds for now.She has a good log with BPs daily. Overall BP is well controlled now. Tries to eat low salt diet for the most part but is at Nursing home and can't always control the diet. Other main issue is her arm pain due to shoulder pain will see pain management specialist Dr. Chin.     5/24/18  Has a lot of pain in both arms, will be seeing Dr. Chin and then may need surgery by Dr. Lujan. Reviewed BP log - mostly 130s-140s, once 96/58. Occasional palpitations - usually with waking up or sleeping.     6/11/18  Pt has been getting painful rash at site of clonidine. Also has an infection possibly at left shoulder where procedure happened for shoulders. She is also seeing surgery today for shoulder pain. Pt also feels very weak due to clonidine and has trouble getting up with 0.2 mg patch. Last night BP went up to 190/71 and took a clonidine .1 mg PO dose which improved BP. Bp mildly elevated today but also in a lot of pain.     7/24/18  Pt is scheduled for  shoulder surgery on shoulder Aug 21st. She has been getting accupuncture which has helped to walk. She has stopped clonidine patch is taking pills BID/TID. BP overall have been well controlled, 140-150s/50-60s. Otherwise feels ok.     11/13/18  She is s/p L shoulder surgery currently undergoing PT. Pt had reverse joint repair/surgery due to a cyst in the joint/bone. Has another month of rehab at least. BP overall has been in 130s/60s. No further dizziness, has stopped metoprolol.   ECG - NSR    6/27/19  She had some allergy to chemicals at [facility] while something was sprayed. She had sneezing, runny nose. She was taking echinacea and other herbal meds which helped. She then took Benadryl and accupuncture. BP has been up for a month. She was off metoprolol, doubled clonidine and still elevated. Discussed wants lower HCTZ but BP has been normal at home as well.   ECG - NSR    2/17/20  She was admitted for afib RVR. Patient was started on amiodarone drip and converted to NSR overnight. She was started on PO amiodarone and discharged home with instructions to follow up with cards outpatient.  She went to ER after DC for HTN urgency - she was given HCTZ.      3/25/21  Increased Imdur last visit. /78 today, HR 60s. She has been having more edema, not taking lasix as much.     5/6/21  BP 150s/70s. HR 66.  increased lasix to 40mg weekly PRN. She feels weak still with dyspnea. She had LE u/s and referred for Dr. Ya no angio now but will f/u.     6/17/21  BP elevated today 170s/60s. HR 60s. She has been having more problems with L leg. She has been having sharp L leg pain. Does not want to see pain management.     9/23/21  Lipids improved slightly from last year. BP is much better, has no further low BPs since lowering Imdur. She has been feeling better off amiodarone. She had an injection in tooth and had root canal which improved. She will see Dr. Mendoza at [facility].     11/30/21  BP and HR well  controlled today. She had a dental infection had oral surgery since last visit, she could not chew much so ate ice cream and soft diet. She is off abx.   BP at home low 110/70s.   ECG - NSR, PACs    1/13/22  BP 140s/80s. HR 70s. She changed BP meds - took Imdur only evening, and stopped HCTZ.    3/24/22  BP is elevated 160s/70s. HR 80s. She had a lung biopsy at [facility] Jan 2022 - neg for cancer, scar noted. But sample size reported to be too small to r/o other causes of nodule - infection/amio/mold neg.     6/15/22  BP occ low 100s systolic's.  BP today 140/80. HR 85. BMI 32 - 187 lbs. She had one episode of BP elevated middle of the night improved with clonidine. She had more arthritis flare ups now. She has more knee pain, will do PT/OT.     July 2022 HPI:   Ms. Bettencourt is a elderly 89-year-old  female with PMH significant for atrial fibrillation on apixaban power, diastolic CHF, HTN, was sent to the ED by her PCP for atrial fibrillation with RVR.  Patient has been complaining of palpitations, fatigue.  Denies chest pain or shortness of breath.  In the ED she was found to have HR in the 150s.  Received diltiazem 10 mg IV x2 doses with no significant improvement.  Started on diltiazem drip, titrating.  Cardiology notified.  Patient will be admitted to the ICU  Admitting diagnosis:  Atrial fibrillation with RVR.  Hospital Course:   7/12: Afib RVR not responsive to Cardizem overnight, started on amiodarone and cardiology consult pending. Afebrile, FIOR, no acute distress  7/13: Started on PO Lopressor and Cardizem, PO amiodarone with improvement in HR, still in Afib. Stable for floor transfer     Admitted for Afib RVR that improved with PO cardizem, lopressor and amiodarone, transitioned from IV drips. Stable for d/c with cards f/u    7/19/22  PT had recent Afib RVR admission was on IV cardizem and then Amio and then DC on PO meds. ECHO 7/2022 with normal bi V function. She is taking cardizem q8, but this AM BP  was low.     BP and HR stable today. BMI 32 - 187 lbs.     Patient feels no chest pain, no sob, no leg swelling, no PND,  no dizziness, no syncope, no CNS symptoms.    Patient is compliant with medications.    Results for orders placed during the hospital encounter of 07/11/22    Echo    Interpretation Summary  · Concentric remodeling and normal systolic function.  · The estimated ejection fraction is 60%.  · Normal left ventricular diastolic function.  · Normal right ventricular size with normal right ventricular systolic function.      Carotid u/s 2/2020  Conclusion    · There is 20-39% right Internal Carotid Stenosis.  · There is 20-39% left Internal Carotid Stenosis.      LE u/s  · 50-99% stenosis bilateral SFA with biphasic and monophasic waveforms  · Moderate to severe PAD BLE    Past Medical History:   Diagnosis Date    Anemia 11/29/2018    Anxiety 11/28/2017    Arthritis     Atrial fibrillation with RVR 10/9/2019    Back pain     Basal cell carcinoma 03/29/2018    left mid back    Chronic diastolic congestive heart failure 10/15/2019    CKD (chronic kidney disease) stage 3, GFR 30-59 ml/min 8/8/2016    Colon polyps     reported per patient.     Coronary artery calcification 10/5/2021    Fuchs' corneal dystrophy     General anesthetics causing adverse effect in therapeutic use     woke up during surgery and gagged on ET tube    Glaucoma     Glaucoma     Heart failure with preserved left ventricular function (HFpEF) 7/24/2018    Hyperlipidemia     Hypertension     Macular degeneration dry    Obesity     PVD (peripheral vascular disease) 4/26/2021    PVD (peripheral vascular disease) 4/26/2021    Squamous cell carcinoma 01/08/2019    left shoulder    Stenosis of carotid artery 10/5/2021    Trouble in sleeping     Type 2 diabetes mellitus without complication, without long-term current use of insulin 2/24/2021    Urinary tract infection        Past Surgical History:   Procedure  Laterality Date    ADENOIDECTOMY  1938    BREAST BIOPSY Left     1997. benign    BREAST CYST EXCISION Left 1997 approx    CARPAL TUNNEL RELEASE Right 2012 approx    CATARACT EXTRACTION Bilateral 1994    CHOLECYSTECTOMY  1975    CORNEAL TRANSPLANT Bilateral 08/2015 8/2015 - left; Right 4/2016    EYE SURGERY      Fuch's Corneal dystrophy - had 2nd operation with Dr. Quintanilla    EYE SURGERY      Cataract wIOL    left thumb Left 2011    removed cuboid for arthritis    REVERSE TOTAL SHOULDER ARTHROPLASTY Left 8/21/2018    Procedure: ARTHROPLASTY, SHOULDER, TOTAL, REVERSE;  Surgeon: Solomon Lujan MD;  Location: [facility] OR;  Service: Orthopedics;  Laterality: Left;  WITH BICEP TENODESIS    SKIN CANCER EXCISION Left 2017    BCC removal by Dr. Valadez    SLT- OS (aka TRABECULOPLASTY) Left 05/11/2011    repeat 3/14/12    TENOTOMY Left 8/21/2018    Procedure: TENOTOMY;  Surgeon: Solomon Lujan MD;  Location: [facility] OR;  Service: Orthopedics;  Laterality: Left;    TONSILLECTOMY  1938    TREATMENT OF CARDIAC ARRHYTHMIA N/A 10/10/2019    Procedure: CARDIOVERSION;  Surgeon: Pete Durán MD;  Location: [facility] CATH LAB;  Service: Cardiology;  Laterality: N/A;    TREATMENT OF CARDIAC ARRHYTHMIA N/A 12/6/2019    Procedure: CARDIOVERSION;  Surgeon: Samy Castillo MD;  Location: [facility] CATH LAB;  Service: Cardiology;  Laterality: N/A;       Social History     Tobacco Use    Smoking status: Never Smoker    Smokeless tobacco: Never Used    Tobacco comment: history of passive smoking from her ex-   Substance Use Topics    Alcohol use: Yes     Alcohol/week: 2.0 standard drinks     Types: 2 Standard drinks or equivalent per week     Comment: occ    Drug use: No       Family History   Problem Relation Age of Onset    Heart disease Mother     Hypertension Mother     Cancer Father 88        sarcoma( ?[place]'s ) on leg    Deep vein thrombosis Neg Hx     Ovarian cancer Neg Hx     Breast cancer Neg Hx     Kidney  disease Neg Hx     Strabismus Neg Hx     Retinal detachment Neg Hx     Macular degeneration Neg Hx     Glaucoma Neg Hx     Blindness Neg Hx     Amblyopia Neg Hx     Eczema Neg Hx     Lupus Neg Hx     Melanoma Neg Hx     Psoriasis Neg Hx     Diabetes Neg Hx     Stroke Neg Hx     Mental retardation Neg Hx     Mental illness Neg Hx     Hyperlipidemia Neg Hx     COPD Neg Hx     Asthma Neg Hx     Depression Neg Hx     Alcohol abuse Neg Hx     Drug abuse Neg Hx        Patient's Medications   New Prescriptions    No medications on file   Previous Medications    ACETAMINOPHEN (TYLENOL) 500 MG TABLET    Take 500 mg by mouth daily as needed. Taking 1 to 3    ALPRAZOLAM (XANAX) 0.5 MG TABLET    Take 1 tablet (0.5 mg total) by mouth nightly as needed for Anxiety.    APIXABAN (ELIQUIS) 2.5 MG TAB    Take 1 tablet (2.5 mg total) by mouth 2 (two) times daily.    B COMPLEX VITAMINS CAPSULE    Take 1 capsule by mouth once daily.    CHOLECALCIFEROL, VITAMIN D3, (VITAMIN D3) 50 MCG (2,000 UNIT) CAP    Take 1 capsule by mouth once daily.    COENZYME Q10 200 MG CAPSULE    Take 400 mg by mouth once daily.     FISH OIL-OMEGA-3 FATTY ACIDS 300-1,000 MG CAPSULE    Take 2 g by mouth 2 (two) times daily.     FUROSEMIDE (LASIX) 40 MG TABLET    Take 1 tablet (40 mg total) by mouth daily as needed (edema, swelling, fluid).    LOSARTAN (COZAAR) 25 MG TABLET    Take 1 tablet (25 mg total) by mouth once daily.    MUPIROCIN (BACTROBAN) 2 % OINTMENT    apply to affected area twice a day with Q-tip. Antibiotic ointment.    NETARSUDIL (RHOPRESSA) 0.02 % OPHTHALMIC SOLUTION    Place 1 drop into the left eye every evening.    POLYETHYLENE GLYCOL 3350 (MIRALAX ORAL)    Take 17 g by mouth 2 (two) times a day. Taking BID    TRAVOPROST (TRAVATAN Z) 0.004 % OPHTHALMIC SOLUTION    PLACE 1 DROP INTO THE LEFT EYE EVERY EVENING.   Modified Medications    Modified Medication Previous Medication    AMIODARONE (PACERONE) 200 MG TAB amiodarone  "(PACERONE) 200 MG Tab       Take 1 tablet (200 mg total) by mouth 2 (two) times daily. After 4 weeks continue amiodarone 200mg daily    Take 1 tablet (200 mg total) by mouth 2 (two) times daily.    DILTIAZEM (CARDIZEM) 60 MG TABLET diltiaZEM (CARDIZEM) 90 MG tablet       Take 1 tablet (60 mg total) by mouth every 8 (eight) hours.    Take 1 tablet (90 mg total) by mouth every 8 (eight) hours.    METOPROLOL TARTRATE (LOPRESSOR) 25 MG TABLET metoprolol tartrate (LOPRESSOR) 25 MG tablet       Take 1 tablet (25 mg total) by mouth 2 (two) times daily.    Take 1 tablet (25 mg total) by mouth 2 (two) times daily.   Discontinued Medications    No medications on file       Review of Systems   Constitutional: Positive for fatigue and malaise/fatigue.   HENT: Negative.    Eyes: Negative.    Respiratory: Positive for shortness of breath.    Cardiovascular: Negative for leg swelling.   Gastrointestinal: Negative.    Genitourinary: Negative.    Musculoskeletal: Positive for back pain and joint pain.   Skin: Negative for rash.   Neurological: Negative for dizziness.   Endo/Heme/Allergies: Negative.    Psychiatric/Behavioral: Negative.        Wt Readings from Last 3 Encounters:   07/19/22 85.1 kg (187 lb 9.8 oz)   07/12/22 83.5 kg (184 lb)   07/11/22 83.7 kg (184 lb 8.4 oz)     Temp Readings from Last 3 Encounters:   07/14/22 97.6 °F (36.4 °C) (Oral)   07/11/22 98.5 °F (36.9 °C) (Tympanic)   05/23/22 97.4 °F (36.3 °C) (Tympanic)     BP Readings from Last 3 Encounters:   07/19/22 137/60   07/14/22 (!) 143/70   07/11/22 100/72     Pulse Readings from Last 3 Encounters:   07/19/22 95   07/14/22 81   07/11/22 (!) 160       /60 (BP Location: Left arm, Patient Position: Sitting, BP Method: Large (Manual))   Pulse 95   Ht 5' 4" (1.626 m)   Wt 85.1 kg (187 lb 9.8 oz)   LMP  (LMP Unknown)   SpO2 95%   BMI 32.20 kg/m²     Objective:   Physical Exam  Vitals and nursing note reviewed.   Constitutional:       General: She is not in " acute distress.     Appearance: She is well-developed. She is not diaphoretic.   HENT:      Head: Normocephalic and atraumatic.      Nose: Nose normal.   Eyes:      General: No scleral icterus.     Conjunctiva/sclera: Conjunctivae normal.   Neck:      Thyroid: No thyromegaly.      Vascular: No JVD.   Cardiovascular:      Rate and Rhythm: Normal rate and regular rhythm.      Heart sounds: S1 normal and S2 normal. No murmur heard.    No friction rub. No gallop. No S3 or S4 sounds.   Pulmonary:      Effort: Pulmonary effort is normal. No respiratory distress.      Breath sounds: Normal breath sounds. No stridor. No wheezing or rales.   Chest:      Chest wall: No tenderness.   Abdominal:      General: Bowel sounds are normal. There is no distension.      Palpations: Abdomen is soft. There is no mass.      Tenderness: There is no abdominal tenderness. There is no rebound.   Genitourinary:     Comments: Deferred  Musculoskeletal:         General: No tenderness or deformity. Normal range of motion.      Cervical back: Normal range of motion and neck supple.   Lymphadenopathy:      Cervical: No cervical adenopathy.   Skin:     General: Skin is warm and dry.      Coloration: Skin is not pale.      Findings: No erythema or rash.   Neurological:      Mental Status: She is alert and oriented to person, place, and time.      Motor: No abnormal muscle tone.      Coordination: Coordination normal.   Psychiatric:         Behavior: Behavior normal.         Thought Content: Thought content normal.         Judgment: Judgment normal.         Lab Results   Component Value Date     07/14/2022    K 3.8 07/14/2022     07/14/2022    CO2 23 07/14/2022    BUN 36 (H) 07/14/2022    CREATININE 1.7 (H) 07/14/2022     (H) 07/14/2022    HGBA1C 5.9 (H) 03/08/2021    MG 1.9 07/14/2022    AST 15 07/12/2022    ALT 13 07/12/2022    ALBUMIN 3.8 07/12/2022    PROT 7.6 07/12/2022    BILITOT 0.3 07/12/2022    WBC 8.12 07/14/2022    HGB  11.8 (L) 07/14/2022    HCT 36.5 (L) 07/14/2022    MCV 99 (H) 07/14/2022     07/14/2022    INR 1.0 02/12/2020    TSH 1.412 01/24/2022    CHOL 232 (H) 07/19/2021    HDL 48 07/19/2021    LDLCALC 155.8 07/19/2021    TRIG 141 07/19/2021     (H) 07/11/2022     Assessment:      1. Primary hypertension    2. Hypertensive left ventricular hypertrophy, without heart failure    3. Calcification of aorta    4. Mixed hyperlipidemia    5. PAD (peripheral artery disease)    6. PVC's (premature ventricular contractions)    7. Stenosis of carotid artery, unspecified laterality    8. PAF (paroxysmal atrial fibrillation)    9. Carotid artery calcification, bilateral    10. 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 Sleep apnea, unspecified type        Plan:   1. HTN  - occ labile - low  - adjust meds, Imdur, cont lasix 40 PRN; restart HCTZ daily  - takes 5 x week  - pt has numerous side effects to almost all other BP meds - norvasc, coreg, verapamil, BB  - cont low salt diet  - increased Losartan to 50mg BID --> dec to 25 mg BID    2. HLD  - cont low manuel diet  - rec Repatha/Praluent but does not want now    3. HFpEF -  - cont low salt diet  - changed lasix to 40mg PRN - has not been using     4. CKD 3-4  - cont to monitor  - needs to f/u nephro     5. PAF - with recent AF v RVR 7/2022   - restarted on Amio  - cont Eliquis with BB and CCB  - r/o ROBINSON  - refer to EP - discuss AAD vs ablation     6. Edema with PVD; carotid artery disease - mild  - cont to monitor   - LE venous and arterial u/s with GEOVANI - moderate PAD; neg for DVT  - f/u with Dr. Ya as needed     7. PreDm/ Obesity - BMI 33 - 192 --> 187 lbs   - not on meds now  - rec low manuel diet and weight loss    8. Dyspnea  - will f/u with pulm - Dr. Mendoza at [facility]    - had lung biopsy at [facility] - neg for CA  - ECHO 7/2022 with normal bi V function.     Thank you for allowing me to participate in this  patient's care. Please do not hesitate to contact me with any questions or concerns. Consult note has been forwarded to the referral physician.

## 2022-07-19 NOTE — PROGRESS NOTES
SUBJECTIVE  Shirley Metz is 89 y.o. female  Corrected distance visual acuity was 20/30 -1 in the right eye and 20/50 -1 in the left eye. Corrected near visual acuity was J1 in the right eye and J1 in the left eye.   Chief Complaint   Patient presents with    Glaucoma     Pt reports for 1m IOP check. Denies any pain or irritation. Va stable. 100% compliant with gtts.           HPI     Glaucoma      Additional comments: Pt reports for 1m IOP check. Denies any pain or   irritation. Va stable. 100% compliant with gtts.               Comments       1. Mild COAG Goal < 19   SLT OD 3/04 (20 to 15) + 3/11 (19 to 15)   SLT OS 5/05 (20 to 14) +5/11 (18-19 to 15) + 3/12 (min resp.) + 3/11/15   (20.5-17.5) + 3/7/18 (24- 21) + 11/3/21 (22.5-17.5)  (Cosopt, Xalatan, and Timolol cause Myalgias)   (Alphagan causes redness) (zioptan too expensive)   Possible steroid responder   2. PCIOL OU   3. Mild Dry AMD   4. Fuch's Dystrophy   DSAEK OD 4/16 Dr. Quintanilla (followed by Dwight every summer)   DSAEK OS 8/15 Dr. Quintanilla   Rx Inveltys (1% lotemax) (IOP 15/23) 5/29/20   Dr. Quintanilla Ok'd to stop Lotemax OU  5. Dry Eye -intolerance to Restasis   6. Recent A-Fib. (from Labetolol ?)       Systane Ultra 4-5 tiimes daily   Travatan Z qhs os   Rhopressa qhs OS     +HAG            Last edited by Toi Garcia on 7/19/2022 12:58 PM. (History)         Assessment /Plan :  1. Primary open angle glaucoma (POAG) of both eyes, mild stage   Doing well, intraocular pressure (IOP) within acceptable range relative to target IOP and no evidence of progression. Continue current treatment. Reviewed importance of continued compliance with treatment and follow up.      Patient instructed to continue using the following glaucoma medication as follows:  Rhopressa one drop in the left eye nightly and Travatan Z one drop in the left eye every night    Return to clinic in 3-4 months  or as needed.  With IOP Check and GOCT       2. Keratitis sicca,  bilateral  -- Condition stable, no therapeutic change required. Monitoring routinely.     3. Pseudophakia  -- Condition stable, no therapeutic change required. Monitoring routinely.     4. History of cornea transplant - followed by Dr. Quintanilla   5.      Refractive Error new rx for readers

## 2022-07-22 ENCOUNTER — OFFICE VISIT (OUTPATIENT)
Dept: PRIMARY CARE CLINIC | Facility: CLINIC | Age: 87
End: 2022-07-22
Payer: COMMERCIAL

## 2022-07-22 VITALS
TEMPERATURE: 99 F | HEIGHT: 64 IN | DIASTOLIC BLOOD PRESSURE: 62 MMHG | SYSTOLIC BLOOD PRESSURE: 112 MMHG | BODY MASS INDEX: 31.99 KG/M2 | OXYGEN SATURATION: 98 % | WEIGHT: 187.38 LBS | HEART RATE: 66 BPM

## 2022-07-22 DIAGNOSIS — I48.0 PAF (PAROXYSMAL ATRIAL FIBRILLATION): Primary | ICD-10-CM

## 2022-07-22 DIAGNOSIS — G47.00 INSOMNIA, UNSPECIFIED TYPE: ICD-10-CM

## 2022-07-22 DIAGNOSIS — D51.9 ANEMIA DUE TO VITAMIN B12 DEFICIENCY, UNSPECIFIED B12 DEFICIENCY TYPE: ICD-10-CM

## 2022-07-22 DIAGNOSIS — I12.9 CKD STAGE 4 SECONDARY TO HYPERTENSION: ICD-10-CM

## 2022-07-22 DIAGNOSIS — Z78.9 STATIN INTOLERANCE: ICD-10-CM

## 2022-07-22 DIAGNOSIS — R73.03 PRE-DIABETES: ICD-10-CM

## 2022-07-22 DIAGNOSIS — M79.10 MYALGIA: ICD-10-CM

## 2022-07-22 DIAGNOSIS — N18.4 CKD STAGE 4 SECONDARY TO HYPERTENSION: ICD-10-CM

## 2022-07-22 DIAGNOSIS — F41.9 ANXIETY: ICD-10-CM

## 2022-07-22 DIAGNOSIS — T88.7XXA MEDICATION SIDE EFFECTS: ICD-10-CM

## 2022-07-22 DIAGNOSIS — G62.9 NEUROPATHY: ICD-10-CM

## 2022-07-22 DIAGNOSIS — E53.8 B12 DEFICIENCY: ICD-10-CM

## 2022-07-22 PROCEDURE — 1160F PR REVIEW ALL MEDS BY PRESCRIBER/CLIN PHARMACIST DOCUMENTED: ICD-10-PCS | Mod: HCNC,CPTII,S$GLB, | Performed by: INTERNAL MEDICINE

## 2022-07-22 PROCEDURE — 1111F PR DISCHARGE MEDS RECONCILED W/ CURRENT OUTPATIENT MED LIST: ICD-10-PCS | Mod: HCNC,CPTII,S$GLB, | Performed by: INTERNAL MEDICINE

## 2022-07-22 PROCEDURE — 3288F PR FALLS RISK ASSESSMENT DOCUMENTED: ICD-10-PCS | Mod: HCNC,CPTII,S$GLB, | Performed by: INTERNAL MEDICINE

## 2022-07-22 PROCEDURE — 99215 OFFICE O/P EST HI 40 MIN: CPT | Mod: HCNC,S$GLB,, | Performed by: INTERNAL MEDICINE

## 2022-07-22 PROCEDURE — 99215 PR OFFICE/OUTPT VISIT, EST, LEVL V, 40-54 MIN: ICD-10-PCS | Mod: HCNC,S$GLB,, | Performed by: INTERNAL MEDICINE

## 2022-07-22 PROCEDURE — 1101F PT FALLS ASSESS-DOCD LE1/YR: CPT | Mod: HCNC,CPTII,S$GLB, | Performed by: INTERNAL MEDICINE

## 2022-07-22 PROCEDURE — 1159F PR MEDICATION LIST DOCUMENTED IN MEDICAL RECORD: ICD-10-PCS | Mod: HCNC,CPTII,S$GLB, | Performed by: INTERNAL MEDICINE

## 2022-07-22 PROCEDURE — 1101F PR PT FALLS ASSESS DOC 0-1 FALLS W/OUT INJ PAST YR: ICD-10-PCS | Mod: HCNC,CPTII,S$GLB, | Performed by: INTERNAL MEDICINE

## 2022-07-22 PROCEDURE — 1111F DSCHRG MED/CURRENT MED MERGE: CPT | Mod: HCNC,CPTII,S$GLB, | Performed by: INTERNAL MEDICINE

## 2022-07-22 PROCEDURE — 3288F FALL RISK ASSESSMENT DOCD: CPT | Mod: HCNC,CPTII,S$GLB, | Performed by: INTERNAL MEDICINE

## 2022-07-22 PROCEDURE — 99999 PR PBB SHADOW E&M-EST. PATIENT-LVL V: ICD-10-PCS | Mod: PBBFAC,HCNC,, | Performed by: INTERNAL MEDICINE

## 2022-07-22 PROCEDURE — 99999 PR PBB SHADOW E&M-EST. PATIENT-LVL V: CPT | Mod: PBBFAC,HCNC,, | Performed by: INTERNAL MEDICINE

## 2022-07-22 PROCEDURE — 1160F RVW MEDS BY RX/DR IN RCRD: CPT | Mod: HCNC,CPTII,S$GLB, | Performed by: INTERNAL MEDICINE

## 2022-07-22 PROCEDURE — 1159F MED LIST DOCD IN RCRD: CPT | Mod: HCNC,CPTII,S$GLB, | Performed by: INTERNAL MEDICINE

## 2022-07-22 NOTE — PATIENT INSTRUCTIONS
F/u aprx 1 mos w either Heather or myself (or earlier if needed)    Cont w home PT    Stay hydrated!    Cont working on healthy food and beverage choices

## 2022-07-22 NOTE — PROGRESS NOTES
Shirley Metz   07/22/2022  [id number]    Heather Miller NP  Patient Care Team:  Heather Miller NP as PCP - General (Family Medicine)  Wilbert Ware MD as Consulting Physician (Ophthalmology)  Rajinder Quintanilla MD as Consulting Physician (Ophthalmology)  Szuan Gregorio PA-C as Physician Assistant (Rheumatology)  Rosario Valadez MD as Consulting Physician (Dermatology)  Trevon Ramirez MD as Consulting Physician (Cardiology)  Amish Mendoza MD (Pulmonary Disease)  Jaquan Choi MD as Consulting Physician (Vascular Surgery)  Debbie Choi MD as Consulting Physician (Gynecology)  Emmanuel Fish MD as Consulting Physician (Hand Surgery)  PAXTON Chaidez Jr., MD as Consulting Physician (Orthopedic Surgery)  Phylicia Deleon MD as Physician (Internal Medicine)    Visit Type:a scheduled visit following a recent hospitalization     Transitional Care Note    Family and/or Caretaker present at visit?  Yes.  Diagnostic tests reviewed/disposition: I have reviewed all completed as well as pending diagnostic tests at the time of discharge.  Disease/illness education: Atrial fibrillation  Home health/community services discussion/referrals: [facility]   Establishment or re-establishment of referral orders for community resources: No other necessary community resources.   Discussion with other health care providers: No discussion with other health care providers necessary.     Chief Complaint:  Chief Complaint   Patient presents with    hospital f/u     2 week f/u      C/o back pain- thinks from being in hospital bed last week  Still feeling a bit run down    Referred to an arrythmia specialist by Dr. Ramirez.  Referred to a nephrologist as well but she's not sure she wants to f/u w nephrology again. Agrees to f/u w labwork ordered by nephrologist and to consider further at that time. Will add my recommended labs for same date.     Home health SN and PT  resumed.    Since discharge home, she has been checking and charting BP and HR. Reports  x 1 w new regimen but otherwise HR has been in the 80's. Reports twice palpitations on waking that resolved spontaneously.      Ms. Bettencourt requests refill of low dose xanax which she had been taking most nights to help w sleep. Did not take during recent hospitalization. Note warning/caution w combination of xanax and diltiazem. While xanax is low dose, we determined safest to discontinue.     History of Present Illness:   Ms. Edmond is an 89 year old female w multiple chronic medical issues including Diastolic CHF, HTN, atherosclerosis, CKD, Afib on Eliquis, obesity, pre-diabetes, osteopenia, and OA. Recent hospitalization for Afib w RVR.     She lives in [facility] - has been there 10 years. Generally happy w the living situation. Breakfast and lunch are provided but are not particularly healthy, challenging to access/incorporate fresh fruits and vegetables.       Hosp/ED/Urgent Care:  7/11 - 7/14 Hosp A-fib w RVR    Recent appointments:   7/19 Cardiology Dr. Ramirez  7/19 Ophthalmology Dr. Ware  7/11 [facility] Heather Miller  6/15 Cardiology Dr. Ramirez  6/08 Rheum Dr. Jiménez (consider trial gabapentin or requip?)  5/31 Ophthalmology Dr. Ware    Upcoming appointments:  Future Appointments     Date Provider Specialty Appt Notes    8/23/2022 Trevon Ramirez MD Cardiology 1 mo f/u    8/31/2022  Lab kamyar    8/31/2022  Lab kamyar    9/6/2022 Paulie Newman MD Nephrology ep/lab/sf    9/21/2022 Toi Kelly MD Cardiology PAF     9/29/2022 Trevon Ramirez MD Cardiology 6 mo    10/25/2022 Wilbert Ware MD Ophthalmology 3-4 months check with [facility]    12/7/2022  Lab labs-[facility]    12/14/2022 Taco Jiménez MD Rheumatology ep/rtc/6months/labs/ca           The following were reviewed: Active problem list, medication list, allergies, family history, social history, and Health Maintenance.      History:  Past Medical History:   Diagnosis Date    Anemia 11/29/2018    Anxiety 11/28/2017    Arthritis     Atrial fibrillation with RVR 10/9/2019    Back pain     Basal cell carcinoma 03/29/2018    left mid back    Chronic diastolic congestive heart failure 10/15/2019    CKD (chronic kidney disease) stage 3, GFR 30-59 ml/min 8/8/2016    Colon polyps     reported per patient.     Coronary artery calcification 10/5/2021    Fuchs' corneal dystrophy     General anesthetics causing adverse effect in therapeutic use     woke up during surgery and gagged on ET tube    Glaucoma     Glaucoma     Heart failure with preserved left ventricular function (HFpEF) 7/24/2018    Hyperlipidemia     Hypertension     Macular degeneration dry    Obesity     PVD (peripheral vascular disease) 4/26/2021    PVD (peripheral vascular disease) 4/26/2021    Squamous cell carcinoma 01/08/2019    left shoulder    Stenosis of carotid artery 10/5/2021    Trouble in sleeping     Type 2 diabetes mellitus without complication, without long-term current use of insulin 2/24/2021    Urinary tract infection      Patient Active Problem List   Diagnosis    HTN (hypertension)    Mixed hyperlipidemia    Primary osteoarthritis involving multiple joints    Macular degeneration - Both Eyes    Fuch's endothelial dystrophy - Both Eyes    Lens replaced by other means    COAG (chronic open-angle glaucoma) - Both Eyes    Blepharitis, unspecified - Both Eyes    Abnormal ECG    PVC's (premature ventricular contractions)    Other microscopic hematuria    LVH (left ventricular hypertrophy) due to hypertensive disease    Osteopenia of hip    Myalgia    Calcification of aorta    Neuropathy    Unequal blood pressures in paired extremities    Medication side effects    History of cornea transplant    Pseudophakia    Insomnia    Anxiety    Pre-diabetes    Obesity (BMI 30.0-34.9)    Elevated troponin    Bilateral  shoulder region arthritis    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    Anemia    Thrombus of left atrial appendage    PAF (paroxysmal atrial fibrillation)    Elevated liver enzymes    Atrial flutter with rapid ventricular response    Chronic anticoagulation    Keratitis sicca, bilateral    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    PAD (peripheral artery disease)    Statin intolerance    Pulmonary nodules/lesions, multiple    Spinal stenosis at L4-L5 level    Pulmonary granuloma    Lumbar spondylosis    Bilateral recurrent inguinal hernia without obstruction or gangrene    LLQ abdominal pain    Pain in left leg    Rectal abnormality    Stenosis of carotid artery    Carotid artery calcification, bilateral    B12 deficiency    Seasonal allergic rhinitis    Atrial fibrillation with RVR    Elevated serum creatinine     Review of patient's allergies indicates:   Allergen Reactions    Carvedilol Swelling     lips  lips  Other reaction(s): swelling    Cephalexin Other (See Comments)     Muscle/joint weakness unable to move     Doxycycline calcium Other (See Comments)     Weakness nausea   sob  Other reaction(s): weakness, nausea, SOB    Erythromycin Other (See Comments)     Complete weakness/could not walk    Gadolinium-containing contrast media Other (See Comments)     angioedema  Other reaction(s): Angioedema    Hydrocodone-acetaminophen      wheezing  wheezing  wheezing  Other reaction(s): wheezing    Inveltys [loteprednol etabonate] Palpitations and Other (See Comments)     Patient stated bp went up.    Labetalol Shortness Of Breath, Nausea Only and Other (See Comments)     Palpitations, LE weakness  Other reaction(s): SOB, palpitations, weakness    Loratadine Other (See Comments)     Double vision/spots  Other reaction(s): double vision    Macrolide antibiotics Other (See Comments)     Complete weakness/could not walk  Complete  weakness/could not walk  Complete weakness/could not walk  Complete weakness/could not walk  Other reaction(s): complete weakness    Moxifloxacin Other (See Comments)     Hives   muscle soreness  Other reaction(s): hives, muscle soreness    Naproxen      wheezing  wheezing  wheezing  Other reaction(s): wheezing    Statins-hmg-coa reductase inhibitors Other (See Comments)     Severe Muscle weakness  Muscle weakness  Severe Muscle weakness  Other reaction(s): severe muscle weakness    Timolol Other (See Comments)     Severe muscle weakness, eye problems.  Other reaction(s): severe muscle weakness    Verapamil Diarrhea     Severe diarrhea.  Other reaction(s): diarrhea    Amlodipine      Myalgias    Other reaction(s): myalgian    Doxycycline     Fenofibrate      Causes muscle weakness  Other reaction(s): muscle weakness    Hydralazine      Other reaction(s): muscle tremors    Hydralazine analogues      Muscle tremors/aches      Hydrocortisone     Isosorbide      Tolerates Imdur    Loteprednol      Other reaction(s): increased BP    Tetanus and diphtheria toxoids     Adhesive Rash     Clonidine patch - 2 mfg - contact dermatitis    Codeine Diarrhea and Other (See Comments)     Loss of balance   Other reaction(s): loss of balance    Doxazosin Palpitations     Other reaction(s): palpitations    Fexofenadine Other (See Comments)     Double vision/spots   Other reaction(s): double vision    Hydrocortisone (bulk) Other (See Comments)     Only suppository/ caused muscle weakness    Latanoprost Other (See Comments)     Muscle weakness  Other reaction(s): muscle weakness    Olopatadine Other (See Comments)     Muscle weakness  Other reaction(s): muscle weakness    Prednisone Anxiety     Medications:  Current Outpatient Medications on File Prior to Visit   Medication Sig Dispense Refill    acetaminophen (TYLENOL) 500 MG tablet Take 500 mg by mouth daily as needed. Taking 1 to 3      amiodarone (PACERONE)  200 MG Tab Take 1 tablet (200 mg total) by mouth 2 (two) times daily. After 4 weeks continue amiodarone 200mg daily 60 tablet 1    apixaban (ELIQUIS) 2.5 mg Tab Take 1 tablet (2.5 mg total) by mouth 2 (two) times daily. 180 tablet 3    b complex vitamins capsule Take 1 capsule by mouth once daily.      cholecalciferol, vitamin D3, (VITAMIN D3) 50 mcg (2,000 unit) Cap Take 1 capsule by mouth once daily.      coenzyme Q10 200 mg capsule Take 400 mg by mouth once daily.       diltiaZEM (CARDIZEM) 60 MG tablet Take 1 tablet (60 mg total) by mouth every 8 (eight) hours. 270 tablet 1    fish oil-omega-3 fatty acids 300-1,000 mg capsule Take 2 g by mouth 2 (two) times daily.       furosemide (LASIX) 40 MG tablet Take 1 tablet (40 mg total) by mouth daily as needed (edema, swelling, fluid). 60 tablet 3    losartan (COZAAR) 25 MG tablet Take 1 tablet (25 mg total) by mouth once daily. 180 tablet 1    metoprolol tartrate (LOPRESSOR) 25 MG tablet Take 1 tablet (25 mg total) by mouth 2 (two) times daily. 180 tablet 1    mupirocin (BACTROBAN) 2 % ointment apply to affected area twice a day with Q-tip. Antibiotic ointment. 30 g 1    netarsudiL (RHOPRESSA) 0.02 % ophthalmic solution Place 1 drop into the left eye every evening. 2.5 mL 12    POLYETHYLENE GLYCOL 3350 (MIRALAX ORAL) Take 17 g by mouth 2 (two) times a day. Taking BID      travoprost (TRAVATAN Z) 0.004 % ophthalmic solution PLACE 1 DROP INTO THE LEFT EYE EVERY EVENING. 2.5 mL 10    [DISCONTINUED] ALPRAZolam (XANAX) 0.5 MG tablet Take 1 tablet (0.5 mg total) by mouth nightly as needed for Anxiety. 30 tablet 5    [DISCONTINUED] cloNIDine (CATAPRES) 0.1 MG tablet Take 1 tablet (0.1 mg total) by mouth 2 (two) times daily as needed (systolic BP over 180). 20 tablet 1    [DISCONTINUED] hydroCHLOROthiazide (HYDRODIURIL) 12.5 MG Tab Take 1 tablet (12.5 mg total) by mouth once daily. 30 tablet 11    [DISCONTINUED] isosorbide mononitrate (IMDUR) 30 MG 24 hr  tablet Take 1 tablet (30 mg total) by mouth every evening. 90 tablet 1     Current Facility-Administered Medications on File Prior to Visit   Medication Dose Route Frequency Provider Last Rate Last Admin    sodium chloride 0.9% flush 3 mL  3 mL Intravenous PRN Steve Galarza PA-C         Medications have been reviewed and reconciled with patient at visit today.    Barriers to medications present (yes )  Many stated medication allergies or sensitivities - She would like to correct and/or remove many of these but was unable to do so herself on [facility] patient portal. She will review and either call or bring corrections for us to update in EHR.    Adverse reactions to current medications (no)  Monitoring BP and HR closely at home    Review of Systems   Constitutional: Positive for fatigue. Negative for fever.   HENT: Positive for postnasal drip and sinus pressure/congestion. Negative for ear pain and sore throat.    Eyes: Negative for pain.   Respiratory: Negative for cough, shortness of breath and wheezing.    Cardiovascular: Positive for palpitations and leg swelling. Negative for chest pain.   Gastrointestinal: Negative for abdominal pain, change in bowel habit, nausea, vomiting and change in bowel habit.   Genitourinary: Positive for frequency and urgency.        No gross hematuria (hematuria noted previously)   Frequency/urgency w diuretic   Musculoskeletal: Positive for arthralgias, gait problem and myalgias.   Integumentary:  Negative for rash and wound.   Allergic/Immunologic: Positive for environmental allergies.   Neurological: Positive for weakness. Negative for dizziness, tremors, light-headedness, disturbances in coordination and coordination difficulties.   Psychiatric/Behavioral: Positive for sleep disturbance. The patient is nervous/anxious.         Difficulty falling asleep       Exam:  Vitals:    07/22/22 1105   BP: 112/62   Pulse: 66   Temp: 98.8 °F (37.1 °C)     Weight: 85 kg (187 lb 6.3 oz)    Body mass index is 32.17 kg/m².      BP Readings from Last 3 Encounters:   07/22/22 112/62   07/19/22 137/60   07/14/22 (!) 143/70        Physical Exam  Constitutional:       General: She is not in acute distress.     Appearance: Normal appearance. She is obese. She is not ill-appearing.   HENT:      Head: Normocephalic and atraumatic.      Right Ear: Tympanic membrane, ear canal and external ear normal.      Left Ear: Tympanic membrane, ear canal and external ear normal.      Nose: Nose normal. No congestion or rhinorrhea.      Mouth/Throat:      Pharynx: Oropharynx is clear. No oropharyngeal exudate or posterior oropharyngeal erythema.   Eyes:      General:         Right eye: No discharge.         Left eye: No discharge.      Extraocular Movements: Extraocular movements intact.      Conjunctiva/sclera: Conjunctivae normal.      Comments: glasses   Neck:      Vascular: No carotid bruit.   Cardiovascular:      Rate and Rhythm: Normal rate. Rhythm irregular.      Heart sounds: No murmur heard.  Pulmonary:      Effort: Pulmonary effort is normal.      Breath sounds: Normal breath sounds. No wheezing.   Abdominal:      General: Abdomen is flat. Bowel sounds are normal.      Palpations: Abdomen is soft.   Musculoskeletal:      Right lower leg: Edema present.      Left lower leg: Edema present.      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   Skin:     General: Skin is warm and dry.      Findings: No rash.   Neurological:      General: No focal deficit present.      Mental Status: She is alert and oriented to person, place, and time. Mental status is at baseline.      Gait: Gait abnormal.      Comments: ambulates w rollator   Psychiatric:         Mood and Affect: Mood normal.         Behavior: Behavior normal.         Thought Content: Thought content normal.        Laboratory Reviewed: (Yes)  Lab Results   Component Value Date    WBC 8.12  07/14/2022    HGB 11.8 (L) 07/14/2022    HCT 36.5 (L) 07/14/2022     07/14/2022    MCV 99 (H) 07/14/2022    CHOL 232 (H) 07/19/2021    TRIG 141 07/19/2021    HDL 48 07/19/2021    LDLCALC 155.8 07/19/2021    ALT 13 07/12/2022    AST 15 07/12/2022     07/14/2022    K 3.8 07/14/2022     07/14/2022    CREATININE 1.7 (H) 07/14/2022    BUN 36 (H) 07/14/2022    CO2 23 07/14/2022    MG 1.9 07/14/2022    TSH 1.412 01/24/2022    INR 1.0 02/12/2020    HGBA1C 5.9 (H) 03/08/2021    CRP 4.0 04/26/2022 7/14/22     crt 1.7    HgA1c    Hydrate!    Assessment:   89 y.o. female with multiple co-morbid illnesses here for continued follow up of medical problems.      The primary encounter diagnosis was PAF (paroxysmal atrial fibrillation). 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      Plan:     Problem List Items Addressed This Visit        Neuro    Neuropathy     Trial of gabapentin or requip suggested by Dr. 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     CKD w eGFR ranging from 26 to 33 over past 8 mos - cont monitor closely - referred by cardiology to nephrology but she's ambivalent about seeing nephrology again - plan to revisit following review of labs scheduled for 8/31- meanwhile, renal dose medication where appropriate - advised to increase fluid intake (elevated BUN) but cont limit sodium intake to extent able              Oncology    Anemia     Mild, borderline, macrocytic - cont B vitamin complex - f/u CBC, repeat B12 8/31           Relevant Orders    CBC Auto Differential     Vitamin B12       Endocrine    Pre-diabetes     Stable - Not a candidate for metformin due to CKD stage 3b/4 - cont efforts toward healthier food/beverage choices when possible - discussed also importance of movement, limiting simple carbs, consider try intermittent fasting            Relevant Orders    Hemoglobin A1C    B12 deficiency     MCV high taking B complex but inconsistently - advised to cont oral supplementation - recheck w next scheduled labs           Relevant Orders    CBC Auto Differential    Vitamin B12       Orthopedic    Myalgia     Trial of gabapentin or requip suggested by Dr. Jiménez, rheumatology - cont work w HH PT for now                Other    Medication side effects     Many stated medication allergies or sensitivities - She would like to correct and/or remove many of these but was unable to do so herself on [facility] patient portal. She will review and either call or bring corrections for us to update in EHR.             Insomnia     Discussed non-medication strategies - low dose xanax discontinued due to interaction w new anti-arrythmic medication, diltiazem           Statin intolerance     Per cardiology note declined to try Repatha/Praluent as alternative                 Health Maintenance       Date Due Completion Date    Shingles Vaccine (1 of 2) Never done ---    TETANUS VACCINE 11/09/2021 11/9/2011    COVID-19 Vaccine (4 - Booster for Pfizer series) 12/29/2021 9/29/2021    Lipid Panel 07/19/2022 7/19/2021    Influenza Vaccine (1) 09/01/2022 3/24/2022    Pneumococcal Vaccines (Age 65+) (2 - PCV) 03/16/2023 3/16/2022    DEXA Scan 10/19/2023 10/19/2020    Override on 11/17/2008: Done    Override on 11/21/2006: Done    Override on 12/8/2003: Done          -Patient's lab results were reviewed and discussed with patient  -Treatment options and alternatives were discussed with the patient. Patient expressed understanding. Patient was given the opportunity to ask questions and be an active  participant in their medical care. Patient had no further questions or concerns at this time.   -Patient is an overall moderate risk for health complications from their medical conditions.     Follow up: Follow up in about 4 weeks (around 8/19/2022) for Follow Up.    Care Plan/Goals: Reviewed No   Goals    None       After visit summary printed and given to patient upon discharge.  Patient care plan are included in After visit summary.    TOTAL TIME evaluating and managing this patient for this encounter was greater than 60 minutes. This time was spent personally by me on the following activities: review of patient's past medical history, assessing age-appropriate health maintenance needs, review of any interval history, review and interpretation of lab results, review and interpretation of imaging test results, review and interpretation of cardiology test results, reviewing consulting specialist notes, obtaining history from the patient and family, examination of the patient, medication reconciliation, managing and/or ordering prescription medications, ordering imaging tests, ordering referral to subspecialty provider(s), educating patient and answering their questions about diagnosis, treatment plan, and goals of treatment, discussing planned follow-up and final documentation of the visit. This time was exclusive of any separately billable procedures for this patient and exclusive of time spent treating any other patients.

## 2022-07-24 PROBLEM — I48.0 PAF (PAROXYSMAL ATRIAL FIBRILLATION): Chronic | Status: ACTIVE | Noted: 2019-12-06

## 2022-07-24 PROBLEM — L03.211 CELLULITIS, FACE: Status: RESOLVED | Noted: 2021-08-25 | Resolved: 2022-07-24

## 2022-07-24 NOTE — ASSESSMENT & PLAN NOTE
CKD w eGFR ranging from 26 to 33 over past 8 mos - cont monitor closely - referred by cardiology to nephrology but she's ambivalent about seeing nephrology again - plan to revisit following review of labs scheduled for 8/31- meanwhile, renal dose medication where appropriate - advised to increase fluid intake (elevated BUN) but cont limit sodium intake to extent able

## 2022-07-24 NOTE — ASSESSMENT & PLAN NOTE
Stable - Not a candidate for metformin due to CKD stage 3b/4 - cont efforts toward healthier food/beverage choices when possible - discussed also importance of movement, limiting simple carbs, consider try intermittent fasting

## 2022-07-24 NOTE — ASSESSMENT & PLAN NOTE
Many stated medication allergies or sensitivities - She would like to correct and/or remove many of these but was unable to do so herself on [facility] patient portal. She will review and either call or bring corrections for us to update in EHR.

## 2022-07-24 NOTE — ASSESSMENT & PLAN NOTE
MCV high taking B complex but inconsistently - advised to cont oral supplementation - recheck w next scheduled labs

## 2022-07-24 NOTE — ASSESSMENT & PLAN NOTE
Discussed non-medication strategies - low dose xanax discontinued due to interaction w new anti-arrythmic medication, diltiazem

## 2022-07-24 NOTE — ASSESSMENT & PLAN NOTE
Trial of gabapentin or requip suggested by Dr. Jiménez, rheumatology - cont work w HH PT for now

## 2022-07-26 ENCOUNTER — OFFICE VISIT (OUTPATIENT)
Dept: CARDIOLOGY | Facility: CLINIC | Age: 87
End: 2022-07-26
Payer: COMMERCIAL

## 2022-07-26 ENCOUNTER — LAB VISIT (OUTPATIENT)
Dept: LAB | Facility: HOSPITAL | Age: 87
End: 2022-07-26
Attending: INTERNAL MEDICINE
Payer: COMMERCIAL

## 2022-07-26 VITALS
BODY MASS INDEX: 31.91 KG/M2 | HEIGHT: 64 IN | SYSTOLIC BLOOD PRESSURE: 129 MMHG | HEART RATE: 89 BPM | DIASTOLIC BLOOD PRESSURE: 71 MMHG | WEIGHT: 186.94 LBS

## 2022-07-26 DIAGNOSIS — I10 PRIMARY HYPERTENSION: Primary | ICD-10-CM

## 2022-07-26 DIAGNOSIS — I49.3 PVC'S (PREMATURE VENTRICULAR CONTRACTIONS): ICD-10-CM

## 2022-07-26 DIAGNOSIS — I48.0 PAF (PAROXYSMAL ATRIAL FIBRILLATION): ICD-10-CM

## 2022-07-26 DIAGNOSIS — I70.0 CALCIFICATION OF AORTA: ICD-10-CM

## 2022-07-26 DIAGNOSIS — I65.23 CAROTID ARTERY CALCIFICATION, BILATERAL: ICD-10-CM

## 2022-07-26 DIAGNOSIS — E78.2 MIXED HYPERLIPIDEMIA: ICD-10-CM

## 2022-07-26 DIAGNOSIS — I73.9 PAD (PERIPHERAL ARTERY DISEASE): ICD-10-CM

## 2022-07-26 DIAGNOSIS — I11.9 HYPERTENSIVE LEFT VENTRICULAR HYPERTROPHY, WITHOUT HEART FAILURE: ICD-10-CM

## 2022-07-26 DIAGNOSIS — I65.29 STENOSIS OF CAROTID ARTERY, UNSPECIFIED LATERALITY: ICD-10-CM

## 2022-07-26 LAB
ANION GAP SERPL CALC-SCNC: 12 MMOL/L (ref 8–16)
BNP SERPL-MCNC: 226 PG/ML (ref 0–99)
BUN SERPL-MCNC: 33 MG/DL (ref 8–23)
CALCIUM SERPL-MCNC: 9.4 MG/DL (ref 8.7–10.5)
CHLORIDE SERPL-SCNC: 106 MMOL/L (ref 95–110)
CO2 SERPL-SCNC: 23 MMOL/L (ref 23–29)
CREAT SERPL-MCNC: 1.7 MG/DL (ref 0.5–1.4)
EST. GFR  (AFRICAN AMERICAN): 30 ML/MIN/1.73 M^2
EST. GFR  (NON AFRICAN AMERICAN): 26 ML/MIN/1.73 M^2
GLUCOSE SERPL-MCNC: 146 MG/DL (ref 70–110)
MAGNESIUM SERPL-MCNC: 2 MG/DL (ref 1.6–2.6)
POTASSIUM SERPL-SCNC: 4.2 MMOL/L (ref 3.5–5.1)
SODIUM SERPL-SCNC: 141 MMOL/L (ref 136–145)

## 2022-07-26 PROCEDURE — 1160F PR REVIEW ALL MEDS BY PRESCRIBER/CLIN PHARMACIST DOCUMENTED: ICD-10-PCS | Mod: HCNC,CPTII,S$GLB, | Performed by: INTERNAL MEDICINE

## 2022-07-26 PROCEDURE — 36415 COLL VENOUS BLD VENIPUNCTURE: CPT | Mod: HCNC | Performed by: INTERNAL MEDICINE

## 2022-07-26 PROCEDURE — 99999 PR PBB SHADOW E&M-EST. PATIENT-LVL IV: CPT | Mod: PBBFAC,HCNC,, | Performed by: INTERNAL MEDICINE

## 2022-07-26 PROCEDURE — 1126F PR PAIN SEVERITY QUANTIFIED, NO PAIN PRESENT: ICD-10-PCS | Mod: HCNC,CPTII,S$GLB, | Performed by: INTERNAL MEDICINE

## 2022-07-26 PROCEDURE — 99214 PR OFFICE/OUTPT VISIT, EST, LEVL IV, 30-39 MIN: ICD-10-PCS | Mod: HCNC,S$GLB,, | Performed by: INTERNAL MEDICINE

## 2022-07-26 PROCEDURE — 1111F PR DISCHARGE MEDS RECONCILED W/ CURRENT OUTPATIENT MED LIST: ICD-10-PCS | Mod: HCNC,CPTII,S$GLB, | Performed by: INTERNAL MEDICINE

## 2022-07-26 PROCEDURE — 1159F PR MEDICATION LIST DOCUMENTED IN MEDICAL RECORD: ICD-10-PCS | Mod: HCNC,CPTII,S$GLB, | Performed by: INTERNAL MEDICINE

## 2022-07-26 PROCEDURE — 1159F MED LIST DOCD IN RCRD: CPT | Mod: HCNC,CPTII,S$GLB, | Performed by: INTERNAL MEDICINE

## 2022-07-26 PROCEDURE — 1160F RVW MEDS BY RX/DR IN RCRD: CPT | Mod: HCNC,CPTII,S$GLB, | Performed by: INTERNAL MEDICINE

## 2022-07-26 PROCEDURE — 1111F DSCHRG MED/CURRENT MED MERGE: CPT | Mod: HCNC,CPTII,S$GLB, | Performed by: INTERNAL MEDICINE

## 2022-07-26 PROCEDURE — 1126F AMNT PAIN NOTED NONE PRSNT: CPT | Mod: HCNC,CPTII,S$GLB, | Performed by: INTERNAL MEDICINE

## 2022-07-26 PROCEDURE — 80048 BASIC METABOLIC PNL TOTAL CA: CPT | Mod: HCNC | Performed by: INTERNAL MEDICINE

## 2022-07-26 PROCEDURE — 99214 OFFICE O/P EST MOD 30 MIN: CPT | Mod: HCNC,S$GLB,, | Performed by: INTERNAL MEDICINE

## 2022-07-26 PROCEDURE — 99999 PR PBB SHADOW E&M-EST. PATIENT-LVL IV: ICD-10-PCS | Mod: PBBFAC,HCNC,, | Performed by: INTERNAL MEDICINE

## 2022-07-26 PROCEDURE — 83880 ASSAY OF NATRIURETIC PEPTIDE: CPT | Mod: HCNC | Performed by: INTERNAL MEDICINE

## 2022-07-26 PROCEDURE — 83735 ASSAY OF MAGNESIUM: CPT | Mod: HCNC | Performed by: INTERNAL MEDICINE

## 2022-07-26 RX ORDER — FUROSEMIDE 40 MG/1
40 TABLET ORAL DAILY PRN
Qty: 60 TABLET | Refills: 3 | Status: SHIPPED | OUTPATIENT
Start: 2022-07-26 | End: 2022-10-13 | Stop reason: SDUPTHER

## 2022-07-26 RX ORDER — HYDROCHLOROTHIAZIDE 25 MG/1
25 TABLET ORAL DAILY
Qty: 90 TABLET | Refills: 1 | Status: SHIPPED | OUTPATIENT
Start: 2022-07-26 | End: 2022-10-13

## 2022-07-27 ENCOUNTER — DOCUMENT SCAN (OUTPATIENT)
Dept: HOME HEALTH SERVICES | Facility: HOSPITAL | Age: 87
End: 2022-07-27
Payer: MEDICARE

## 2022-07-28 ENCOUNTER — TELEPHONE (OUTPATIENT)
Dept: PRIMARY CARE CLINIC | Facility: CLINIC | Age: 87
End: 2022-07-28
Payer: MEDICARE

## 2022-07-28 DIAGNOSIS — F41.9 ANXIETY: Primary | ICD-10-CM

## 2022-07-28 RX ORDER — ALPRAZOLAM 0.25 MG/1
0.25 TABLET ORAL NIGHTLY PRN
Qty: 30 TABLET | Refills: 2 | Status: SHIPPED | OUTPATIENT
Start: 2022-07-28 | End: 2022-09-27 | Stop reason: SDUPTHER

## 2022-07-28 NOTE — TELEPHONE ENCOUNTER
Patient wants to know if you can give her a call. She stated that she had been taking the xanax & the diltiazem and noticed that she was awake for 4 days straight. She stated her cardiologist informed her it was okay to take these medications. She says she will need  A refill on the Diltiazem but her prescription shows that the script is good from 7/19/22-7/19/23. She wants to be advised on what she should do?

## 2022-07-28 NOTE — TELEPHONE ENCOUNTER
Spoke to pt. Hasn't slept in 4 days. Requests alprazolam. Feels okay otherwise. HR avg 90. Discussed risks/benefits. Will resume alprazolam at lower dose.

## 2022-07-28 NOTE — TELEPHONE ENCOUNTER
----- Message from Rebecca Becker sent at 7/28/2022  9:42 AM CDT -----  Pt is needing a call back regarding a reaction to a new medication the pt ha had. Please call .[phone]

## 2022-08-03 ENCOUNTER — HOSPITAL ENCOUNTER (OUTPATIENT)
Dept: CARDIOLOGY | Facility: HOSPITAL | Age: 87
Discharge: HOME | End: 2022-08-03
Attending: INTERNAL MEDICINE
Payer: COMMERCIAL

## 2022-08-03 DIAGNOSIS — I48.0 PAF (PAROXYSMAL ATRIAL FIBRILLATION): ICD-10-CM

## 2022-08-03 DIAGNOSIS — I49.3 PVC'S (PREMATURE VENTRICULAR CONTRACTIONS): ICD-10-CM

## 2022-08-03 PROCEDURE — 93248 CV CARDIAC MONITOR - 3-15 DAY ADULT (CUPID ONLY): ICD-10-PCS | Mod: HCNC,,, | Performed by: STUDENT IN AN ORGANIZED HEALTH CARE EDUCATION/TRAINING PROGRAM

## 2022-08-03 PROCEDURE — 93248 EXT ECG>7D<15D REV&INTERPJ: CPT | Mod: HCNC,,, | Performed by: STUDENT IN AN ORGANIZED HEALTH CARE EDUCATION/TRAINING PROGRAM

## 2022-08-08 ENCOUNTER — DOCUMENT SCAN (OUTPATIENT)
Dept: HOME HEALTH SERVICES | Facility: HOSPITAL | Age: 87
End: 2022-08-08
Payer: MEDICARE

## 2022-08-09 ENCOUNTER — OFFICE VISIT (OUTPATIENT)
Dept: PRIMARY CARE CLINIC | Facility: CLINIC | Age: 87
End: 2022-08-09
Payer: COMMERCIAL

## 2022-08-09 VITALS
OXYGEN SATURATION: 95 % | DIASTOLIC BLOOD PRESSURE: 62 MMHG | HEART RATE: 86 BPM | TEMPERATURE: 98 F | BODY MASS INDEX: 31.84 KG/M2 | WEIGHT: 186.5 LBS | SYSTOLIC BLOOD PRESSURE: 138 MMHG | HEIGHT: 64 IN

## 2022-08-09 DIAGNOSIS — I48.0 PAF (PAROXYSMAL ATRIAL FIBRILLATION): Primary | ICD-10-CM

## 2022-08-09 DIAGNOSIS — I12.9 CKD STAGE 4 SECONDARY TO HYPERTENSION: ICD-10-CM

## 2022-08-09 DIAGNOSIS — M79.10 MYALGIA: ICD-10-CM

## 2022-08-09 DIAGNOSIS — I13.2 HYPERTENSIVE HEART AND RENAL DISEASE WITH BOTH (CONGESTIVE) HEART FAILURE AND RENAL FAILURE: ICD-10-CM

## 2022-08-09 DIAGNOSIS — I48.91 ATRIAL FIBRILLATION WITH RVR: ICD-10-CM

## 2022-08-09 DIAGNOSIS — Z79.899 LONG TERM CURRENT USE OF AMIODARONE: ICD-10-CM

## 2022-08-09 DIAGNOSIS — R73.03 PRE-DIABETES: ICD-10-CM

## 2022-08-09 DIAGNOSIS — D68.9 COAGULOPATHY: ICD-10-CM

## 2022-08-09 DIAGNOSIS — N18.4 CKD STAGE 4 SECONDARY TO HYPERTENSION: ICD-10-CM

## 2022-08-09 DIAGNOSIS — N19 HYPERTENSIVE HEART AND RENAL DISEASE WITH BOTH (CONGESTIVE) HEART FAILURE AND RENAL FAILURE: ICD-10-CM

## 2022-08-09 PROBLEM — T50.905A MEDICATION INDUCED COAGULOPATHY: Status: RESOLVED | Noted: 2022-08-09 | Resolved: 2022-08-09

## 2022-08-09 PROBLEM — T50.905A MEDICATION INDUCED COAGULOPATHY: Status: ACTIVE | Noted: 2022-08-09

## 2022-08-09 PROCEDURE — 1160F RVW MEDS BY RX/DR IN RCRD: CPT | Mod: HCNC,CPTII,S$GLB, | Performed by: NURSE PRACTITIONER

## 2022-08-09 PROCEDURE — 1111F PR DISCHARGE MEDS RECONCILED W/ CURRENT OUTPATIENT MED LIST: ICD-10-PCS | Mod: HCNC,CPTII,S$GLB, | Performed by: NURSE PRACTITIONER

## 2022-08-09 PROCEDURE — 99215 PR OFFICE/OUTPT VISIT, EST, LEVL V, 40-54 MIN: ICD-10-PCS | Mod: HCNC,S$GLB,, | Performed by: NURSE PRACTITIONER

## 2022-08-09 PROCEDURE — 1159F MED LIST DOCD IN RCRD: CPT | Mod: HCNC,CPTII,S$GLB, | Performed by: NURSE PRACTITIONER

## 2022-08-09 PROCEDURE — 1125F AMNT PAIN NOTED PAIN PRSNT: CPT | Mod: HCNC,CPTII,S$GLB, | Performed by: NURSE PRACTITIONER

## 2022-08-09 PROCEDURE — 99417 PR PROLONGED SVC, OUTPT, W/WO DIRECT PT CONTACT,  EA ADDTL 15 MIN: ICD-10-PCS | Mod: HCNC,S$GLB,, | Performed by: NURSE PRACTITIONER

## 2022-08-09 PROCEDURE — 99417 PROLNG OP E/M EACH 15 MIN: CPT | Mod: HCNC,S$GLB,, | Performed by: NURSE PRACTITIONER

## 2022-08-09 PROCEDURE — 1101F PR PT FALLS ASSESS DOC 0-1 FALLS W/OUT INJ PAST YR: ICD-10-PCS | Mod: HCNC,CPTII,S$GLB, | Performed by: NURSE PRACTITIONER

## 2022-08-09 PROCEDURE — 3288F FALL RISK ASSESSMENT DOCD: CPT | Mod: HCNC,CPTII,S$GLB, | Performed by: NURSE PRACTITIONER

## 2022-08-09 PROCEDURE — 1101F PT FALLS ASSESS-DOCD LE1/YR: CPT | Mod: HCNC,CPTII,S$GLB, | Performed by: NURSE PRACTITIONER

## 2022-08-09 PROCEDURE — 1111F DSCHRG MED/CURRENT MED MERGE: CPT | Mod: HCNC,CPTII,S$GLB, | Performed by: NURSE PRACTITIONER

## 2022-08-09 PROCEDURE — 99999 PR PBB SHADOW E&M-EST. PATIENT-LVL V: CPT | Mod: PBBFAC,HCNC,, | Performed by: NURSE PRACTITIONER

## 2022-08-09 PROCEDURE — 1159F PR MEDICATION LIST DOCUMENTED IN MEDICAL RECORD: ICD-10-PCS | Mod: HCNC,CPTII,S$GLB, | Performed by: NURSE PRACTITIONER

## 2022-08-09 PROCEDURE — 1160F PR REVIEW ALL MEDS BY PRESCRIBER/CLIN PHARMACIST DOCUMENTED: ICD-10-PCS | Mod: HCNC,CPTII,S$GLB, | Performed by: NURSE PRACTITIONER

## 2022-08-09 PROCEDURE — 99215 OFFICE O/P EST HI 40 MIN: CPT | Mod: HCNC,S$GLB,, | Performed by: NURSE PRACTITIONER

## 2022-08-09 PROCEDURE — 3288F PR FALLS RISK ASSESSMENT DOCUMENTED: ICD-10-PCS | Mod: HCNC,CPTII,S$GLB, | Performed by: NURSE PRACTITIONER

## 2022-08-09 PROCEDURE — 99999 PR PBB SHADOW E&M-EST. PATIENT-LVL V: ICD-10-PCS | Mod: PBBFAC,HCNC,, | Performed by: NURSE PRACTITIONER

## 2022-08-09 PROCEDURE — 1125F PR PAIN SEVERITY QUANTIFIED, PAIN PRESENT: ICD-10-PCS | Mod: HCNC,CPTII,S$GLB, | Performed by: NURSE PRACTITIONER

## 2022-08-09 NOTE — PROGRESS NOTES
"Shirley Metz  08/09/2022  [id number]    Heather Millre NP  Patient Care Team:  Heather Miller NP as PCP - General (Family Medicine)  Wilbert Ware MD as Consulting Physician (Ophthalmology)  Rajinder Quintanilla MD as Consulting Physician (Ophthalmology)  Suzan Gregorio PA-C as Physician Assistant (Rheumatology)  Rosario Valadez MD as Consulting Physician (Dermatology)  Trevon Ramirez MD as Consulting Physician (Cardiology)  Amish Mendoza MD (Pulmonary Disease)  Jaquan Choi MD as Consulting Physician (Vascular Surgery)  Debbie Choi MD as Consulting Physician (Gynecology)  Emmanuel Fish MD as Consulting Physician (Hand Surgery)  PAXTON Chaidez Jr., MD as Consulting Physician (Orthopedic Surgery)  Phylicia Deleon MD as Physician (Internal Medicine)        [facility] Primary Care Note      Chief Complaint:  Chief Complaint   Patient presents with    "Not feeling well"    Back Pain    Headache       History of Present Illness:  HPI    Not feeling well.   Back ache from mid back radiating to BLEs.  Pain decreased with OTCs. No reproducible pain today.   Onset last night. More severe than usual. Difficulty sleeping, restlessness.     Slight increase in dyspnea but not as severe as right after discharge.     Also h/a frontal. Onset last night. Across eyes radiating down her body. No rhinorrhea. Never had similar headache. Not present. Lasted about 4 hours. VS okay during this episode. No blurred vision at that time.     Significant dry eyes. She attributes to amlodipine. Blurred vision a couple of times past couple. Last about an hour, sx improved with Systane drops. Has eye exam scheduled in September.    Has decreased HCTZ dose, taking Lasix PRN. Reports taking 12.5 mg HCTZ daily instead of 25 mg daily. Stopped isosorbide after hospitalization.     Has home PT. Balance exercises difficult but improving. No falls recently.     5 years since last stress test.   Cannot use " "MyChart!! Computer isn't working.           Dr Ramirez 7/26   1. HTN  - occ labile  - adjust meds, Imdur, cont lasix 40 PRN; restart HCTZ daily  - takes 5 x week  - pt has numerous side effects to almost all other BP meds - norvasc, coreg, verapamil, BB  - cont low salt diet  - Losartan to 50mg BID --> dec to 25 mg BID   2. HLD  - cont low manuel diet  - rec Repatha/Praluent but does not want now   3. 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 PAF - with recent AF v RVR 7/2022   - restarted on Amio  - cont Eliquis with BB and CCB  - r/o ROBINSON  - refer to EP - discuss AAD vs ablation   - order 14 day Bardy     Appt with EP in late September.   Taking lower dose of alprazolam to help with rest. Takes nightly.     Home BPs "okay" per pt.           Review of Systems   Constitutional: Negative for chills and fever.   HENT:        Allergy sx, rhinorrhea, intemittent   Eyes: Positive for blurred vision and redness (chronic). Negative for double vision, pain and discharge.   Respiratory: Positive for shortness of breath (mild RUIZ, worse than usual. ).    Cardiovascular: Positive for palpitations (this AM.). Negative for chest pain.   Gastrointestinal: Negative for nausea and vomiting.   Genitourinary: Negative for dysuria.   Musculoskeletal: Negative for myalgias.   Neurological: Positive for headaches. Negative for dizziness and speech change.   Psychiatric/Behavioral: The patient has insomnia.          The following were reviewed: Active problem list, medication list, allergies, family history, social history, and Health Maintenance.     History:  Past Medical History:   Diagnosis Date    Anemia 11/29/2018    Anxiety 11/28/2017    Arthritis     Atrial fibrillation with RVR 10/9/2019    Back pain     Basal cell carcinoma 03/29/2018    left mid back    Chronic diastolic congestive heart failure 10/15/2019    CKD (chronic " kidney disease) stage 3, GFR 30-59 ml/min 8/8/2016    Colon polyps     reported per patient.     Coronary artery calcification 10/5/2021    Fuchs' corneal dystrophy     General anesthetics causing adverse effect in therapeutic use     woke up during surgery and gagged on ET tube    Glaucoma     Glaucoma     Heart failure with preserved left ventricular function (HFpEF) 7/24/2018    Hyperlipidemia     Hypertension     Macular degeneration dry    Obesity     PVD (peripheral vascular disease) 4/26/2021    PVD (peripheral vascular disease) 4/26/2021    Squamous cell carcinoma 01/08/2019    left shoulder    Stenosis of carotid artery 10/5/2021    Trouble in sleeping     Type 2 diabetes mellitus without complication, without long-term current use of insulin 2/24/2021    Urinary tract infection      Past Surgical History:   Procedure Laterality Date    ADENOIDECTOMY  1938    BREAST BIOPSY Left     1997. benign    BREAST CYST EXCISION Left 1997 approx    CARPAL TUNNEL RELEASE Right 2012 approx    CATARACT EXTRACTION Bilateral 1994    CHOLECYSTECTOMY  1975    CORNEAL TRANSPLANT Bilateral 08/2015 8/2015 - left; Right 4/2016    EYE SURGERY      Fuch's Corneal dystrophy - had 2nd operation with Dr. Quintanilla    EYE SURGERY      Cataract wIOL    left thumb Left 2011    removed cuboid for arthritis    REVERSE TOTAL SHOULDER ARTHROPLASTY Left 8/21/2018    Procedure: ARTHROPLASTY, SHOULDER, TOTAL, REVERSE;  Surgeon: Solomon Lujan MD;  Location: [facility] OR;  Service: Orthopedics;  Laterality: Left;  WITH BICEP TENODESIS    SKIN CANCER EXCISION Left 2017    BCC removal by Dr. Valadez    SLT- OS (aka TRABECULOPLASTY) Left 05/11/2011    repeat 3/14/12    TENOTOMY Left 8/21/2018    Procedure: TENOTOMY;  Surgeon: Solomon Lujan MD;  Location: [facility] OR;  Service: Orthopedics;  Laterality: Left;    TONSILLECTOMY  1938    TREATMENT OF CARDIAC ARRHYTHMIA N/A 10/10/2019    Procedure: CARDIOVERSION;   Surgeon: Pete Durán MD;  Location: [facility] CATH LAB;  Service: Cardiology;  Laterality: N/A;    TREATMENT OF CARDIAC ARRHYTHMIA N/A 12/6/2019    Procedure: CARDIOVERSION;  Surgeon: Samy Castillo MD;  Location: [facility] CATH LAB;  Service: Cardiology;  Laterality: N/A;     Family History   Problem Relation Age of Onset    Heart disease Mother     Hypertension Mother     Cancer Father 88        sarcoma( ?[place]'s ) on leg    Deep vein thrombosis Neg Hx     Ovarian cancer Neg Hx     Breast cancer Neg Hx     Kidney disease Neg Hx     Strabismus Neg Hx     Retinal detachment Neg Hx     Macular degeneration Neg Hx     Glaucoma Neg Hx     Blindness Neg Hx     Amblyopia Neg Hx     Eczema Neg Hx     Lupus Neg Hx     Melanoma Neg Hx     Psoriasis Neg Hx     Diabetes Neg Hx     Stroke Neg Hx     Mental retardation Neg Hx     Mental illness Neg Hx     Hyperlipidemia Neg Hx     COPD Neg Hx     Asthma Neg Hx     Depression Neg Hx     Alcohol abuse Neg Hx     Drug abuse Neg Hx      Social History     Socioeconomic History    Marital status:     Number of children: 3   Tobacco Use    Smoking status: Never Smoker    Smokeless tobacco: Never Used    Tobacco comment: history of passive smoking from her ex-   Substance and Sexual Activity    Alcohol use: Yes     Alcohol/week: 2.0 standard drinks     Types: 2 Standard drinks or equivalent per week     Comment: occ    Drug use: No    Sexual activity: Not Currently     Partners: Male     Birth control/protection: Post-menopausal     Comment: discussed protection for STD's   Other Topics Concern    Are you pregnant or think you may be? No    Breast-feeding No   Social History Narrative     x 2,  x 1. Retired artist - previously doing jewelry/wall pieces; ; lives in [place]; has 3 sons - 52( lives in [place], 54, and 56;exercises minimally - limited due to back issues. Still drives. Does have a Living  Will.     Social Determinants of Health     Financial Resource Strain: Low Risk     Difficulty of Paying Living Expenses: Not hard at all   Food Insecurity: No Food Insecurity    Worried About Running Out of Food in the Last Year: Never true    Ran Out of Food in the Last Year: Never true   Transportation Needs: No Transportation Needs    Lack of Transportation (Medical): No    Lack of Transportation (Non-Medical): No   Physical Activity: Insufficiently Active    Days of Exercise per Week: 3 days    Minutes of Exercise per Session: 10 min   Stress: No Stress Concern Present    Feeling of Stress : Not at all   Social Connections: Moderately Isolated    Frequency of Communication with Friends and Family: Three times a week    Frequency of Social Gatherings with Friends and Family: Three times a week    Attends Spiritism Services: Never    Active Member of Clubs or Organizations: Yes    Attends Club or Organization Meetings: More than 4 times per year    Marital Status:    Housing Stability: Low Risk     Unable to Pay for Housing in the Last Year: No    Number of Places Lived in the Last Year: 1    Unstable Housing in the Last Year: No     Patient Active Problem List   Diagnosis    HTN (hypertension)    Mixed hyperlipidemia    Primary osteoarthritis involving multiple joints    Macular degeneration - Both Eyes    Fuch's endothelial dystrophy - Both Eyes    Lens replaced by other means    COAG (chronic open-angle glaucoma) - Both Eyes    Blepharitis, unspecified - Both Eyes    Abnormal ECG    PVC's (premature ventricular contractions)    Other microscopic hematuria    LVH (left ventricular hypertrophy) due to hypertensive disease    Osteopenia of hip    Myalgia    Calcification of aorta    Neuropathy    Unequal blood pressures in paired extremities    Medication side effects    History of cornea transplant    Pseudophakia    Insomnia    Anxiety    Pre-diabetes    Obesity  (BMI 30.0-34.9)    Elevated troponin    Bilateral shoulder region arthritis    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    Anemia    Thrombus of left atrial appendage    PAF (paroxysmal atrial fibrillation)    Elevated liver enzymes    Atrial flutter with rapid ventricular response    Chronic anticoagulation    Keratitis sicca, bilateral    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    PAD (peripheral artery disease)    Statin intolerance    Pulmonary nodules/lesions, multiple    Spinal stenosis at L4-L5 level    Pulmonary granuloma    Lumbar spondylosis    Bilateral recurrent inguinal hernia without obstruction or gangrene    LLQ abdominal pain    Pain in left leg    Rectal abnormality    Stenosis of carotid artery    Carotid artery calcification, bilateral    B12 deficiency    Seasonal allergic rhinitis    Atrial fibrillation with RVR    Elevated serum creatinine    Coagulopathy     Review of patient's allergies indicates:   Allergen Reactions    Carvedilol Swelling     lips  lips  Other reaction(s): swelling    Cephalexin Other (See Comments)     Muscle/joint weakness unable to move     Doxycycline calcium Other (See Comments)     Weakness nausea   sob  Other reaction(s): weakness, nausea, SOB    Erythromycin Other (See Comments)     Complete weakness/could not walk    Gadolinium-containing contrast media Other (See Comments)     angioedema  Other reaction(s): Angioedema    Hydrocodone-acetaminophen      wheezing  wheezing  wheezing  Other reaction(s): wheezing    Inveltys [loteprednol etabonate] Palpitations and Other (See Comments)     Patient stated bp went up.    Labetalol Shortness Of Breath, Nausea Only and Other (See Comments)     Palpitations, LE weakness  Other reaction(s): SOB, palpitations, weakness    Loratadine Other (See Comments)     Double vision/spots  Other reaction(s): double vision    Macrolide antibiotics  Other (See Comments)     Complete weakness/could not walk  Complete weakness/could not walk  Complete weakness/could not walk  Complete weakness/could not walk  Other reaction(s): complete weakness    Moxifloxacin Other (See Comments)     Hives   muscle soreness  Other reaction(s): hives, muscle soreness    Naproxen      wheezing  wheezing  wheezing  Other reaction(s): wheezing    Statins-hmg-coa reductase inhibitors Other (See Comments)     Severe Muscle weakness  Muscle weakness  Severe Muscle weakness  Other reaction(s): severe muscle weakness    Timolol Other (See Comments)     Severe muscle weakness, eye problems.  Other reaction(s): severe muscle weakness    Verapamil Diarrhea     Severe diarrhea.  Other reaction(s): diarrhea    Amlodipine      Myalgias    Other reaction(s): myalgian    Doxycycline     Doxycycline hyclate      Other reaction(s): weakness, nausea, SOB    Fenofibrate      Causes muscle weakness  Other reaction(s): muscle weakness    Hydralazine      Other reaction(s): muscle tremors    Hydralazine analogues      Muscle tremors/aches      Hydrocortisone     Isosorbide      Tolerates Imdur    Loteprednol      Other reaction(s): increased BP    Tetanus and diphtheria toxoids     Adhesive Rash     Clonidine patch - 2 mfg - contact dermatitis    Codeine Diarrhea and Other (See Comments)     Loss of balance   Other reaction(s): loss of balance    Doxazosin Palpitations     Other reaction(s): palpitations    Fexofenadine Other (See Comments)     Double vision/spots   Other reaction(s): double vision    Hydrocortisone (bulk) Other (See Comments)     Only suppository/ caused muscle weakness    Latanoprost Other (See Comments)     Muscle weakness  Other reaction(s): muscle weakness    Olopatadine Other (See Comments)     Muscle weakness  Other reaction(s): muscle weakness    Prednisone Anxiety       Medications:  Current Outpatient Medications on File Prior to Visit   Medication Sig  Dispense Refill    acetaminophen (TYLENOL) 500 MG tablet Take 500 mg by mouth daily as needed. Taking 1 to 3      ALPRAZolam (XANAX) 0.25 MG tablet Take 1 tablet (0.25 mg total) by mouth nightly as needed for Insomnia or Anxiety. 30 tablet 2    amiodarone (PACERONE) 200 MG Tab Take 1 tablet (200 mg total) by mouth 2 (two) times daily. After 4 weeks continue amiodarone 200mg daily 60 tablet 1    apixaban (ELIQUIS) 2.5 mg Tab Take 1 tablet (2.5 mg total) by mouth 2 (two) times daily. 180 tablet 3    b complex vitamins capsule Take 1 capsule by mouth once daily.      cholecalciferol, vitamin D3, (VITAMIN D3) 50 mcg (2,000 unit) Cap Take 1 capsule by mouth once daily.      coenzyme Q10 200 mg capsule Take 400 mg by mouth once daily.       fish oil-omega-3 fatty acids 300-1,000 mg capsule Take 2 g by mouth 2 (two) times daily.       furosemide (LASIX) 40 MG tablet Take 1 tablet (40 mg total) by mouth daily as needed (edema, swelling, fluid). 60 tablet 3    hydroCHLOROthiazide (HYDRODIURIL) 25 MG tablet Take 1 tablet (25 mg total) by mouth once daily. 90 tablet 1    losartan (COZAAR) 25 MG tablet Take 1 tablet (25 mg total) by mouth once daily. 180 tablet 1    metoprolol tartrate (LOPRESSOR) 25 MG tablet Take 1 tablet (25 mg total) by mouth 2 (two) times daily. 180 tablet 1    mupirocin (BACTROBAN) 2 % ointment apply to affected area twice a day with Q-tip. Antibiotic ointment. 30 g 1    netarsudiL (RHOPRESSA) 0.02 % ophthalmic solution Place 1 drop into the left eye every evening. 2.5 mL 12    POLYETHYLENE GLYCOL 3350 (MIRALAX ORAL) Take 17 g by mouth 2 (two) times a day. Taking BID      travoprost (TRAVATAN Z) 0.004 % ophthalmic solution PLACE 1 DROP INTO THE LEFT EYE EVERY EVENING. 2.5 mL 10    diltiaZEM (CARDIZEM) 60 MG tablet Take 1 tablet (60 mg total) by mouth every 8 (eight) hours. 270 tablet 1    [DISCONTINUED] cloNIDine (CATAPRES) 0.1 MG tablet Take 1 tablet (0.1 mg total) by mouth 2 (two)  times daily as needed (systolic BP over 180). 20 tablet 1    [DISCONTINUED] isosorbide mononitrate (IMDUR) 30 MG 24 hr tablet Take 1 tablet (30 mg total) by mouth every evening. 90 tablet 1     Current Facility-Administered Medications on File Prior to Visit   Medication Dose Route Frequency Provider Last Rate Last Admin    sodium chloride 0.9% flush 3 mL  3 mL Intravenous PRN Steve Galarza PA-C           Medications have been reviewed and reconciled with patient at visit today.    Barriers to medications present (yes )    Adverse reactions to current medications (no)      Exam:  Vitals:    08/09/22 1056   BP: 138/62   Pulse: 86   Temp: 98 °F (36.7 °C)     Weight: 84.6 kg (186 lb 8.2 oz)   Body mass index is 32.01 kg/m².      BP Readings from Last 3 Encounters:   08/09/22 138/62   07/26/22 129/71   07/22/22 112/62     Wt Readings from Last 3 Encounters:   08/09/22 1056 84.6 kg (186 lb 8.2 oz)   07/26/22 1301 84.8 kg (186 lb 15.2 oz)   07/22/22 1105 85 kg (187 lb 6.3 oz)            Physical Exam  Constitutional:       General: She is not in acute distress.  HENT:      Right Ear: Ear canal and external ear normal. There is impacted cerumen.      Left Ear: Tympanic membrane, ear canal and external ear normal. There is no impacted cerumen.      Ears:      Comments: Effusion on right     Nose: Nose normal.      Mouth/Throat:      Mouth: Mucous membranes are moist.   Eyes:      General: No scleral icterus.     Extraocular Movements: Extraocular movements intact.      Conjunctiva/sclera: Conjunctivae normal.      Pupils: Pupils are equal, round, and reactive to light.   Cardiovascular:      Rate and Rhythm: Normal rate. Rhythm irregular.      Heart sounds: Normal heart sounds.   Pulmonary:      Effort: Pulmonary effort is normal.      Breath sounds: Normal breath sounds.   Abdominal:      Palpations: Abdomen is soft.      Tenderness: There is no abdominal tenderness.   Musculoskeletal:         General: Swelling  (mild BLE, not pitting) present.      Cervical back: Neck supple.   Neurological:      Mental Status: She is alert. Mental status is at baseline.      Cranial Nerves: No cranial nerve deficit.      Motor: No weakness.   Psychiatric:         Mood and Affect: Mood normal.         Behavior: Behavior normal.         Thought Content: Thought content normal.         Laboratory Reviewed: (Yes)  Lab Results   Component Value Date    WBC 8.12 07/14/2022    HGB 11.8 (L) 07/14/2022    HCT 36.5 (L) 07/14/2022     07/14/2022    CHOL 232 (H) 07/19/2021    TRIG 141 07/19/2021    HDL 48 07/19/2021    ALT 13 07/12/2022    AST 15 07/12/2022     07/26/2022    K 4.2 07/26/2022     07/26/2022    CREATININE 1.7 (H) 07/26/2022    BUN 33 (H) 07/26/2022    CO2 23 07/26/2022    TSH 1.412 01/24/2022    INR 1.0 02/12/2020    HGBA1C 5.9 (H) 03/08/2021           Health Maintenance  Health Maintenance Topics with due status: Not Due       Topic Last Completion Date    DEXA Scan 10/19/2020    Pneumococcal Vaccines (Age 65+) 03/16/2022    Influenza Vaccine 03/24/2022     Health Maintenance Due   Topic Date Due    Shingles Vaccine (1 of 2) Never done    TETANUS VACCINE  11/09/2021    COVID-19 Vaccine (4 - Booster for Pfizer series) 12/29/2021    Lipid Panel  07/19/2022       Assessment:  Problem List Items Addressed This Visit        Cardiac/Vascular    PAF (paroxysmal atrial fibrillation) - Primary (Chronic)    Relevant Orders    CBC Auto Differential    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 Renal function stable.   Has nephrology appt in Sept.   Continue POC.   Declines any statin or similar tx.           Atrial fibrillation with RVR     Likely Afib today. Continue Eliquis, amiodarone.   Rate controlled with cardizem.                 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  Continues Eliquis.               Endocrine    Pre-diabetes    Relevant Orders    Hemoglobin A1C       Orthopedic    Myalgia    Relevant Orders    CK      Other Visit Diagnoses     Long term current use of amiodarone        Relevant Orders    TSH            Plan:  PAF (paroxysmal atrial fibrillation)  -     CBC Auto Differential; Future; Expected date: 08/09/2022    Pre-diabetes  -     Hemoglobin A1C; Future; Expected date: 08/09/2022    Myalgia  Comments:  unclear cause of back pain. continue APAP and PT. Expect to gradually resolve. If becomes worse then RTC.  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 Patient expressed understanding. Patient was given the opportunity to ask questions and be an active participant in their medical care. Patient had no further questions or concerns at this time.   -Documentation of patient's health and condition was obtained from family member who was present during visit.  -Patient is an overall moderate risk for health complications from their medical conditions.       Follow up: Follow up in about 4 weeks (around 9/6/2022).      After visit summary printed and given to patient upon discharge.  Patient goals and care plan are included in After visit summary.    Total medical decision making time was 71 min.  The following issues were discussed: The primary encounter diagnosis was PAF (paroxysmal atrial fibrillation). 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    Health maintenance needs, recent test results and goals of care discussed with pt and questions answered.

## 2022-08-09 NOTE — ASSESSMENT & PLAN NOTE
HF compensated, HTN controlled. Renal function stable.   Has nephrology appt in Sept.   Continue POC.   Declines any statin or similar tx.

## 2022-08-11 ENCOUNTER — PES CALL (OUTPATIENT)
Dept: ADMINISTRATIVE | Facility: CLINIC | Age: 87
End: 2022-08-11
Payer: MEDICARE

## 2022-08-11 ENCOUNTER — DOCUMENT SCAN (OUTPATIENT)
Dept: HOME HEALTH SERVICES | Facility: HOSPITAL | Age: 87
End: 2022-08-11
Payer: MEDICARE

## 2022-08-12 ENCOUNTER — TELEPHONE (OUTPATIENT)
Dept: CARDIOLOGY | Facility: CLINIC | Age: 87
End: 2022-08-12
Payer: MEDICARE

## 2022-08-12 NOTE — TELEPHONE ENCOUNTER
Spoke with patient let her know that results are not in yet. Let her know to double up on lasix for today and tomorrow. We will call back to give results.     ----- Message from Trevon Ramirez MD sent at 8/12/2022 10:13 AM CDT -----  Regarding: RE: Advice  Contact: Patient  I do not see the results yet once scanned in I can view or the doctor reading today will finalize later. For the ankle swelling she can double the lasix today and tomorrow morning and keep legs elevated. Thanks      ----- Message -----  From: Anusha Colby MA  Sent: 8/12/2022  10:09 AM CDT  To: Trevon Ramirez MD  Subject: FW: Advice                                       Please review holter monitor and pt is having swelling in her ankles. Please advise.   ----- Message -----  From: Joanie Lynch  Sent: 8/12/2022   8:57 AM CDT  To: [facility] L[facility] Staff  Subject: Advice                                           Patient would like a call back concerning her heart monitor results and swelling in her ankles. Please call at  . [phone] or .[phone] (home)

## 2022-08-14 LAB
OHS CV HOLTER SINUS AVERAGE HR: 77
OHS CV HOLTER SINUS MAX HR: 123
OHS CV HOLTER SINUS MIN HR: 41

## 2022-08-17 ENCOUNTER — OFFICE VISIT (OUTPATIENT)
Dept: CARDIOLOGY | Facility: CLINIC | Age: 87
End: 2022-08-17
Payer: COMMERCIAL

## 2022-08-17 ENCOUNTER — TELEPHONE (OUTPATIENT)
Dept: OPHTHALMOLOGY | Facility: CLINIC | Age: 87
End: 2022-08-17
Payer: MEDICARE

## 2022-08-17 VITALS
WEIGHT: 187.63 LBS | HEIGHT: 64 IN | DIASTOLIC BLOOD PRESSURE: 72 MMHG | SYSTOLIC BLOOD PRESSURE: 98 MMHG | OXYGEN SATURATION: 97 % | BODY MASS INDEX: 32.03 KG/M2 | HEART RATE: 66 BPM

## 2022-08-17 DIAGNOSIS — I48.0 PAF (PAROXYSMAL ATRIAL FIBRILLATION): ICD-10-CM

## 2022-08-17 DIAGNOSIS — I73.9 PAD (PERIPHERAL ARTERY DISEASE): ICD-10-CM

## 2022-08-17 DIAGNOSIS — I10 PRIMARY HYPERTENSION: Chronic | ICD-10-CM

## 2022-08-17 DIAGNOSIS — I70.0 CALCIFICATION OF AORTA: Primary | Chronic | ICD-10-CM

## 2022-08-17 DIAGNOSIS — I48.91 ATRIAL FIBRILLATION WITH RVR: ICD-10-CM

## 2022-08-17 PROCEDURE — 1159F PR MEDICATION LIST DOCUMENTED IN MEDICAL RECORD: ICD-10-PCS | Mod: HCNC,CPTII,S$GLB, | Performed by: INTERNAL MEDICINE

## 2022-08-17 PROCEDURE — 99205 PR OFFICE/OUTPT VISIT, NEW, LEVL V, 60-74 MIN: ICD-10-PCS | Mod: HCNC,S$GLB,, | Performed by: INTERNAL MEDICINE

## 2022-08-17 PROCEDURE — 99999 PR PBB SHADOW E&M-EST. PATIENT-LVL V: CPT | Mod: PBBFAC,HCNC,, | Performed by: INTERNAL MEDICINE

## 2022-08-17 PROCEDURE — 99205 OFFICE O/P NEW HI 60 MIN: CPT | Mod: HCNC,S$GLB,, | Performed by: INTERNAL MEDICINE

## 2022-08-17 PROCEDURE — 1126F AMNT PAIN NOTED NONE PRSNT: CPT | Mod: HCNC,CPTII,S$GLB, | Performed by: INTERNAL MEDICINE

## 2022-08-17 PROCEDURE — 3288F PR FALLS RISK ASSESSMENT DOCUMENTED: ICD-10-PCS | Mod: HCNC,CPTII,S$GLB, | Performed by: INTERNAL MEDICINE

## 2022-08-17 PROCEDURE — 1126F PR PAIN SEVERITY QUANTIFIED, NO PAIN PRESENT: ICD-10-PCS | Mod: HCNC,CPTII,S$GLB, | Performed by: INTERNAL MEDICINE

## 2022-08-17 PROCEDURE — 1159F MED LIST DOCD IN RCRD: CPT | Mod: HCNC,CPTII,S$GLB, | Performed by: INTERNAL MEDICINE

## 2022-08-17 PROCEDURE — 1101F PR PT FALLS ASSESS DOC 0-1 FALLS W/OUT INJ PAST YR: ICD-10-PCS | Mod: HCNC,CPTII,S$GLB, | Performed by: INTERNAL MEDICINE

## 2022-08-17 PROCEDURE — 99999 PR PBB SHADOW E&M-EST. PATIENT-LVL V: ICD-10-PCS | Mod: PBBFAC,HCNC,, | Performed by: INTERNAL MEDICINE

## 2022-08-17 PROCEDURE — 3288F FALL RISK ASSESSMENT DOCD: CPT | Mod: HCNC,CPTII,S$GLB, | Performed by: INTERNAL MEDICINE

## 2022-08-17 PROCEDURE — 1101F PT FALLS ASSESS-DOCD LE1/YR: CPT | Mod: HCNC,CPTII,S$GLB, | Performed by: INTERNAL MEDICINE

## 2022-08-17 NOTE — TELEPHONE ENCOUNTER
I spoke with Dr. Ware and he said that it is alright to take Amiodarone Hcl and Cardizem as prescribed by her cardiologist. The patient has been notified.

## 2022-08-17 NOTE — PROGRESS NOTES
Subjective:    Patient ID:  Shriley Metz is a 89 y.o. female who presents for evaluation of Atrial Fibrillation      89 yoF referred for AF management. NSR on recent ECGs up until 6/15/22. AFL and AF on ECGs afterwards starting 7/11/22. Event monitor with 100% AF, controlled average V rate. Normal EF. She had AF after a new BP agent 10/19. Her symptoms are mild. She has tried amiodarone in the past which caused side effects. She has history of CAD and has QTc 450s.     Echo 7/12/22:  · Concentric remodeling and normal systolic function.  · The estimated ejection fraction is 60%.  · Normal left ventricular diastolic function.  · Normal right ventricular size with normal right ventricular systolic function.    Event monitor 7/22:  - Heart rates varied between 41 and 123 BPM with an average of 77 BPM.  - Predominant rhythm: atrial fibrillation   - Atrial Fibrillation (AF) 100.0 %  - PVC 0.48 %      Past Medical History:  11/29/2018: Anemia  11/28/2017: Anxiety  No date: Arthritis  10/9/2019: Atrial fibrillation with RVR  No date: Back pain  03/29/2018: Basal cell carcinoma      Comment:  left mid back  10/15/2019: Chronic diastolic congestive heart failure  8/8/2016: CKD (chronic kidney disease) stage 3, GFR 30-59 ml/min  No date: Colon polyps      Comment:  reported per patient.   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  No date: General anesthetics causing adverse effect in therapeutic use      Comment:  woke up during surgery and gagged on ET tube  No date: Glaucoma  No date: Glaucoma  7/24/2018: Heart failure with preserved left ventricular function   (HFpEF)  No date: Hyperlipidemia  No date: Hypertension  dry: Macular degeneration  No date: Obesity  4/26/2021: PVD (peripheral vascular disease)  4/26/2021: PVD (peripheral vascular disease)  01/08/2019: Squamous cell carcinoma      Comment:  left shoulder  10/5/2021: Stenosis of carotid artery  No date: Trouble in  sleeping  2/24/2021: Type 2 diabetes mellitus without complication, without   long-term current use of insulin  No date: Urinary tract infection    Past Surgical History:  1938: ADENOIDECTOMY  No date: BREAST BIOPSY; Left      Comment:  1997. benign  1997 approx: BREAST CYST EXCISION; Left  2012 approx: CARPAL TUNNEL RELEASE; Right  1994: CATARACT EXTRACTION; Bilateral  1975: CHOLECYSTECTOMY  08/2015: CORNEAL TRANSPLANT; Bilateral      Comment:  8/2015 - left; Right 4/2016  No date: EYE SURGERY      Comment:  Fuch's Corneal dystrophy - had 2nd operation with Dr. Quintanilla  No date: EYE SURGERY      Comment:  Cataract wIOL  2011: left thumb; Left      Comment:  removed cuboid for arthritis  8/21/2018: REVERSE TOTAL SHOULDER ARTHROPLASTY; Left      Comment:  Procedure: ARTHROPLASTY, SHOULDER, TOTAL, REVERSE;                 Surgeon: Solomon Lujan MD;  Location: [facility];                 Service: Orthopedics;  Laterality: Left;  WITH BICEP                TENODESIS  2017: SKIN CANCER EXCISION; Left      Comment:  BCC removal by Dr. Valadez  05/11/2011: SLT- OS (aka TRABECULOPLASTY); Left      Comment:  repeat 3/14/12  8/21/2018: TENOTOMY; Left      Comment:  Procedure: TENOTOMY;  Surgeon: Solomon Lujan MD;                 Location: [facility] OR;  Service: Orthopedics;  Laterality:                Left;  1938: TONSILLECTOMY  10/10/2019: TREATMENT OF CARDIAC ARRHYTHMIA; N/A      Comment:  Procedure: CARDIOVERSION;  Surgeon: Pete Durán MD;                Location: [facility] CATH LAB;  Service: Cardiology;                 Laterality: N/A;  12/6/2019: TREATMENT OF CARDIAC ARRHYTHMIA; N/A      Comment:  Procedure: CARDIOVERSION;  Surgeon: Samy Castillo MD;                 Location: [facility] CATH LAB;  Service: Cardiology;                 Laterality: N/A;    Social History    Socioeconomic History      Marital status:       Number of children: 3    Tobacco Use      Smoking status: Never Smoker      Smokeless  tobacco: Never Used      Tobacco comment: history of passive smoking from her ex-    Substance and Sexual Activity      Alcohol use: Yes        Alcohol/week: 2.0 standard drinks        Types: 2 Standard drinks or equivalent per week        Comment: occ      Drug use: No      Sexual activity: Not Currently        Partners: Male        Birth control/protection: Post-menopausal        Comment: discussed protection for STD's    Other Topics      Concerns:        Are you pregnant or think you may be?: No        Breast-feeding: No    Social History Narrative       x 2,  x 1. Retired artist - previously doing jewelry/wall pieces; ; lives in [facility]; has 3 sons - 52( lives in [place], 54, and 56;exercises minimally - limited due to back issues. Still drives. Does have a Living Will.    Social Determinants of Health  Financial Resource Strain: Low Risk       Difficulty of Paying Living Expenses: Not hard at all  Food Insecurity: No Food Insecurity      Worried About Running Out of Food in the Last Year: Never true      Ran Out of Food in the Last Year: Never true  Transportation Needs: No Transportation Needs      Lack of Transportation (Medical): No      Lack of Transportation (Non-Medical): No  Physical Activity: Insufficiently Active      Days of Exercise per Week: 3 days      Minutes of Exercise per Session: 10 min  Stress: No Stress Concern Present      Feeling of Stress : Not at all  Social Connections: Moderately Isolated      Frequency of Communication with Friends and Family: Three times a week      Frequency of Social Gatherings with Friends and Family: Three times a week      Attends Latter day Services: Never      Active Member of Clubs or Organizations: Yes      Attends Club or Organization Meetings: More than 4 times per year      Marital Status:   Housing Stability: Low Risk       Unable to Pay for Housing in the Last Year: No      Number of Places Lived  in the Last Year: 1      Unstable Housing in the Last Year: No    Review of patient's family history indicates:  Problem: Heart disease      Relation: Mother          Age of Onset: (Not Specified)  Problem: Hypertension      Relation: Mother          Age of Onset: (Not Specified)  Problem: Cancer      Relation: Father          Age of Onset: 88          Comment: sarcoma( ?Kaposi's ) on leg  Problem: Deep vein thrombosis      Relation: Neg Hx          Age of Onset: (Not Specified)  Problem: Ovarian cancer      Relation: Neg Hx          Age of Onset: (Not Specified)  Problem: Breast cancer      Relation: Neg Hx          Age of Onset: (Not Specified)  Problem: Kidney disease      Relation: Neg Hx          Age of Onset: (Not Specified)  Problem: Strabismus      Relation: Neg Hx          Age of Onset: (Not Specified)  Problem: Retinal detachment      Relation: Neg Hx          Age of Onset: (Not Specified)  Problem: Macular degeneration      Relation: Neg Hx          Age of Onset: (Not Specified)  Problem: Glaucoma      Relation: Neg Hx          Age of Onset: (Not Specified)  Problem: Blindness      Relation: Neg Hx          Age of Onset: (Not Specified)  Problem: Amblyopia      Relation: Neg Hx          Age of Onset: (Not Specified)  Problem: Eczema      Relation: Neg Hx          Age of Onset: (Not Specified)  Problem: Lupus      Relation: Neg Hx          Age of Onset: (Not Specified)  Problem: Melanoma      Relation: Neg Hx          Age of Onset: (Not Specified)  Problem: Psoriasis      Relation: Neg Hx          Age of Onset: (Not Specified)  Problem: Diabetes      Relation: Neg Hx          Age of Onset: (Not Specified)  Problem: Stroke      Relation: Neg Hx          Age of Onset: (Not Specified)  Problem: Mental retardation      Relation: Neg Hx          Age of Onset: (Not Specified)  Problem: Mental illness      Relation: Neg Hx          Age of Onset: (Not Specified)  Problem: Hyperlipidemia      Relation: Neg Hx           Age of Onset: (Not Specified)  Problem: COPD      Relation: Neg Hx          Age of Onset: (Not Specified)  Problem: Asthma      Relation: Neg Hx          Age of Onset: (Not Specified)  Problem: Depression      Relation: Neg Hx          Age of Onset: (Not Specified)  Problem: Alcohol abuse      Relation: Neg Hx          Age of Onset: (Not Specified)  Problem: Drug abuse      Relation: Neg Hx          Age of Onset: (Not Specified)    Current Outpatient Medications:  acetaminophen (TYLENOL) 500 MG tablet, Take 500 mg by mouth daily as needed. Taking 1 to 3, Disp: , Rfl:   ALPRAZolam (XANAX) 0.25 MG tablet, Take 1 tablet (0.25 mg total) by mouth nightly as needed for Insomnia or Anxiety., Disp: 30 tablet, Rfl: 2  amiodarone (PACERONE) 200 MG Tab, Take 1 tablet (200 mg total) by mouth 2 (two) times daily. After 4 weeks continue amiodarone 200mg daily, Disp: 60 tablet, Rfl: 1  apixaban (ELIQUIS) 2.5 mg Tab, Take 1 tablet (2.5 mg total) by mouth 2 (two) times daily., Disp: 180 tablet, Rfl: 3  b complex vitamins capsule, Take 1 capsule by mouth once daily., Disp: , Rfl:   cholecalciferol, vitamin D3, (VITAMIN D3) 50 mcg (2,000 unit) Cap, Take 1 capsule by mouth once daily., Disp: , Rfl:   coenzyme Q10 200 mg capsule, Take 400 mg by mouth once daily. , Disp: , Rfl:   diltiaZEM (CARDIZEM) 60 MG tablet, Take 1 tablet (60 mg total) by mouth every 8 (eight) hours., Disp: 270 tablet, Rfl: 1  fish oil-omega-3 fatty acids 300-1,000 mg capsule, Take 2 g by mouth 2 (two) times daily. , Disp: , Rfl:   furosemide (LASIX) 40 MG tablet, Take 1 tablet (40 mg total) by mouth daily as needed (edema, swelling, fluid)., Disp: 60 tablet, Rfl: 3  hydroCHLOROthiazide (HYDRODIURIL) 25 MG tablet, Take 1 tablet (25 mg total) by mouth once daily., Disp: 90 tablet, Rfl: 1  losartan (COZAAR) 25 MG tablet, Take 1 tablet (25 mg total) by mouth once daily., Disp: 180 tablet, Rfl: 1  metoprolol tartrate (LOPRESSOR) 25 MG tablet, Take 1 tablet (25 mg  total) by mouth 2 (two) times daily., Disp: 180 tablet, Rfl: 1  mupirocin (BACTROBAN) 2 % ointment, apply to affected area twice a day with Q-tip. Antibiotic ointment., Disp: 30 g, Rfl: 1  POLYETHYLENE GLYCOL 3350 (MIRALAX ORAL), Take 17 g by mouth 2 (two) times a day. Taking BID, Disp: , Rfl:   RHOPRESSA 0.02 % ophthalmic solution, PLACE 1 DROP INTO THE LEFT EYE EVERY EVENING., Disp: 2.5 mL, Rfl: 11  travoprost (TRAVATAN Z) 0.004 % ophthalmic solution, PLACE 1 DROP INTO THE LEFT EYE EVERY EVENING., Disp: 2.5 mL, Rfl: 10    No current facility-administered medications for this visit.  Facility-Administered Medications Ordered in Other Visits:  sodium chloride 0.9% flush 3 mL, 3 mL, Intravenous, PRN, Steve Galarza PA-C            Review of Systems   Constitutional: Negative for diaphoresis, malaise/fatigue and weight gain.   HENT: Negative for hoarse voice.    Eyes: Negative for double vision and visual disturbance.   Cardiovascular: Positive for dyspnea on exertion. Negative for chest pain, claudication, cyanosis, irregular heartbeat, leg swelling, near-syncope, orthopnea, palpitations, paroxysmal nocturnal dyspnea and syncope.   Respiratory: Positive for shortness of breath. Negative for cough, hemoptysis and snoring.    Hematologic/Lymphatic: Negative for bleeding problem. Does not bruise/bleed easily.   Skin: Negative for color change and poor wound healing.   Musculoskeletal: Negative for muscle cramps, muscle weakness and myalgias.   Gastrointestinal: Negative for bloating, abdominal pain, change in bowel habit, diarrhea, heartburn, hematemesis, hematochezia, melena and nausea.   Neurological: Negative for excessive daytime sleepiness, dizziness, headaches, light-headedness, loss of balance, numbness and weakness.        HAS HOTTNESS IN BOTH FEET AND ARMS AND HANDS.    Psychiatric/Behavioral: Negative for memory loss. The patient does not have insomnia.    Allergic/Immunologic: Negative for hives.         Objective:    Physical Exam  Vitals and nursing note reviewed.   Constitutional:       General: She is not in acute distress.     Appearance: She is well-developed. She is not diaphoretic.   HENT:      Head: Normocephalic and atraumatic.      Nose: Nose normal.   Eyes:      General: No scleral icterus.     Conjunctiva/sclera: Conjunctivae normal.   Neck:      Thyroid: No thyromegaly.      Vascular: No JVD.   Cardiovascular:      Rate and Rhythm: Normal rate and regular rhythm.      Heart sounds: S1 normal and S2 normal. No murmur heard.    No friction rub. No gallop. No S3 or S4 sounds.   Pulmonary:      Effort: Pulmonary effort is normal. No respiratory distress.      Breath sounds: Normal breath sounds. No stridor. No wheezing or rales.   Chest:      Chest wall: No tenderness.   Abdominal:      General: Bowel sounds are normal. There is no distension.      Palpations: Abdomen is soft. There is no mass.      Tenderness: There is no abdominal tenderness. There is no rebound.   Genitourinary:     Comments: Deferred  Musculoskeletal:         General: No tenderness or deformity. Normal range of motion.      Cervical back: Normal range of motion and neck supple.   Lymphadenopathy:      Cervical: No cervical adenopathy.   Skin:     General: Skin is warm and dry.      Coloration: Skin is not pale.      Findings: No erythema or rash.   Neurological:      Mental Status: She is alert and oriented to person, place, and time.      Motor: No abnormal muscle tone.      Coordination: Coordination normal.   Psychiatric:         Behavior: Behavior normal.         Thought Content: Thought content normal.         Judgment: Judgment normal.           Assessment:       1. Calcification of aorta    2. PAF (paroxysmal atrial fibrillation)    3. Atrial fibrillation with RVR    4. Primary hypertension    5. PAD (peripheral artery disease)         Plan:       89 yoF with persistent AF. I discussed AF and its basic pathophysiology,  including its health implications and treatment options with the patient. Specifically, I addressed the need for CVA prophylaxis as well as the goal to reduce symptomatic arrhythmic episodes by pharmacologic and/or procedural methods. She has mildly symptomatic persistent, rate controlled AF. She is not a good candidate for AAD therapy. I discussed options including rate control, re-attempt CV and PVI. Will reassess in 4-6w

## 2022-08-17 NOTE — TELEPHONE ENCOUNTER
The patient called today stating that she was in the hospital for A- fib. She was on IV Amiodarone Hcl and Cardizem for 4 days. When she was released from the hospital, she received a prescription for Amiodarone Hcl 200 mg once daily and Cardizem 60 mg 3 times a day. The patient was researching these medications and she was wondering if it is safe for her to take due to her cornea issues. The patient is going to see a cardiologist in one month to discuss treatment for her A-fib. The patient's regular cardiologist is Dr. Ramirez. I will discuss with Dr. Ware tomorrow and call the patient back with his response.

## 2022-08-24 ENCOUNTER — EXTERNAL HOME HEALTH (OUTPATIENT)
Dept: HOME HEALTH SERVICES | Facility: HOSPITAL | Age: 87
End: 2022-08-24
Payer: MEDICARE

## 2022-08-31 ENCOUNTER — LAB VISIT (OUTPATIENT)
Dept: LAB | Facility: HOSPITAL | Age: 87
End: 2022-08-31
Attending: INTERNAL MEDICINE
Payer: COMMERCIAL

## 2022-08-31 DIAGNOSIS — N18.4 CKD STAGE 4 SECONDARY TO HYPERTENSION: ICD-10-CM

## 2022-08-31 DIAGNOSIS — M79.10 MYALGIA: ICD-10-CM

## 2022-08-31 DIAGNOSIS — R73.03 PRE-DIABETES: ICD-10-CM

## 2022-08-31 DIAGNOSIS — I12.9 CKD STAGE 4 SECONDARY TO HYPERTENSION: ICD-10-CM

## 2022-08-31 DIAGNOSIS — Z79.899 LONG TERM CURRENT USE OF AMIODARONE: ICD-10-CM

## 2022-08-31 DIAGNOSIS — I48.0 PAF (PAROXYSMAL ATRIAL FIBRILLATION): ICD-10-CM

## 2022-08-31 LAB
ALBUMIN SERPL BCP-MCNC: 3.6 G/DL (ref 3.5–5.2)
ALP SERPL-CCNC: 77 U/L (ref 55–135)
ALT SERPL W/O P-5'-P-CCNC: 13 U/L (ref 10–44)
ANION GAP SERPL CALC-SCNC: 11 MMOL/L (ref 8–16)
AST SERPL-CCNC: 18 U/L (ref 10–40)
BASOPHILS # BLD AUTO: 0.05 K/UL (ref 0–0.2)
BASOPHILS NFR BLD: 0.6 % (ref 0–1.9)
BILIRUB SERPL-MCNC: 0.6 MG/DL (ref 0.1–1)
BUN SERPL-MCNC: 26 MG/DL (ref 8–23)
CALCIUM SERPL-MCNC: 9.4 MG/DL (ref 8.7–10.5)
CHLORIDE SERPL-SCNC: 105 MMOL/L (ref 95–110)
CHOLEST SERPL-MCNC: 168 MG/DL (ref 120–199)
CHOLEST/HDLC SERPL: 3.9 {RATIO} (ref 2–5)
CK SERPL-CCNC: 35 U/L (ref 20–180)
CO2 SERPL-SCNC: 23 MMOL/L (ref 23–29)
CREAT SERPL-MCNC: 1.5 MG/DL (ref 0.5–1.4)
DIFFERENTIAL METHOD: ABNORMAL
EOSINOPHIL # BLD AUTO: 0.2 K/UL (ref 0–0.5)
EOSINOPHIL NFR BLD: 2.3 % (ref 0–8)
ERYTHROCYTE [DISTWIDTH] IN BLOOD BY AUTOMATED COUNT: 13.5 % (ref 11.5–14.5)
EST. GFR  (NO RACE VARIABLE): 33.1 ML/MIN/1.73 M^2
ESTIMATED AVG GLUCOSE: 131 MG/DL (ref 68–131)
GLUCOSE SERPL-MCNC: 103 MG/DL (ref 70–110)
HBA1C MFR BLD: 6.2 % (ref 4–5.6)
HCT VFR BLD AUTO: 37.3 % (ref 37–48.5)
HDLC SERPL-MCNC: 43 MG/DL (ref 40–75)
HDLC SERPL: 25.6 % (ref 20–50)
HGB BLD-MCNC: 11.6 G/DL (ref 12–16)
IMM GRANULOCYTES # BLD AUTO: 0.02 K/UL (ref 0–0.04)
IMM GRANULOCYTES NFR BLD AUTO: 0.2 % (ref 0–0.5)
LDLC SERPL CALC-MCNC: 105.4 MG/DL (ref 63–159)
LYMPHOCYTES # BLD AUTO: 1.2 K/UL (ref 1–4.8)
LYMPHOCYTES NFR BLD: 14.7 % (ref 18–48)
MCH RBC QN AUTO: 32 PG (ref 27–31)
MCHC RBC AUTO-ENTMCNC: 31.1 G/DL (ref 32–36)
MCV RBC AUTO: 103 FL (ref 82–98)
MONOCYTES # BLD AUTO: 0.9 K/UL (ref 0.3–1)
MONOCYTES NFR BLD: 10.3 % (ref 4–15)
NEUTROPHILS # BLD AUTO: 6 K/UL (ref 1.8–7.7)
NEUTROPHILS NFR BLD: 71.9 % (ref 38–73)
NONHDLC SERPL-MCNC: 125 MG/DL
NRBC BLD-RTO: 0 /100 WBC
PLATELET # BLD AUTO: 231 K/UL (ref 150–450)
PMV BLD AUTO: 10.8 FL (ref 9.2–12.9)
POTASSIUM SERPL-SCNC: 4.1 MMOL/L (ref 3.5–5.1)
PROT SERPL-MCNC: 7.3 G/DL (ref 6–8.4)
RBC # BLD AUTO: 3.62 M/UL (ref 4–5.4)
SODIUM SERPL-SCNC: 139 MMOL/L (ref 136–145)
TRIGL SERPL-MCNC: 98 MG/DL (ref 30–150)
TSH SERPL DL<=0.005 MIU/L-ACNC: 3.83 UIU/ML (ref 0.4–4)
WBC # BLD AUTO: 8.41 K/UL (ref 3.9–12.7)

## 2022-08-31 PROCEDURE — 83036 HEMOGLOBIN GLYCOSYLATED A1C: CPT | Mod: HCNC | Performed by: NURSE PRACTITIONER

## 2022-08-31 PROCEDURE — 36415 COLL VENOUS BLD VENIPUNCTURE: CPT | Mod: HCNC | Performed by: INTERNAL MEDICINE

## 2022-08-31 PROCEDURE — 85025 COMPLETE CBC W/AUTO DIFF WBC: CPT | Mod: HCNC | Performed by: NURSE PRACTITIONER

## 2022-08-31 PROCEDURE — 84443 ASSAY THYROID STIM HORMONE: CPT | Mod: HCNC | Performed by: NURSE PRACTITIONER

## 2022-08-31 PROCEDURE — 80053 COMPREHEN METABOLIC PANEL: CPT | Mod: HCNC | Performed by: INTERNAL MEDICINE

## 2022-08-31 PROCEDURE — 82550 ASSAY OF CK (CPK): CPT | Mod: HCNC | Performed by: NURSE PRACTITIONER

## 2022-08-31 PROCEDURE — 80061 LIPID PANEL: CPT | Mod: HCNC | Performed by: NURSE PRACTITIONER

## 2022-09-02 ENCOUNTER — TELEPHONE (OUTPATIENT)
Dept: CARDIOLOGY | Facility: CLINIC | Age: 87
End: 2022-09-02
Payer: MEDICARE

## 2022-09-02 ENCOUNTER — OFFICE VISIT (OUTPATIENT)
Dept: PRIMARY CARE CLINIC | Facility: CLINIC | Age: 87
End: 2022-09-02
Payer: COMMERCIAL

## 2022-09-02 ENCOUNTER — LAB VISIT (OUTPATIENT)
Dept: LAB | Facility: HOSPITAL | Age: 87
End: 2022-09-02
Attending: NURSE PRACTITIONER
Payer: MEDICARE

## 2022-09-02 VITALS
SYSTOLIC BLOOD PRESSURE: 130 MMHG | HEART RATE: 81 BPM | DIASTOLIC BLOOD PRESSURE: 68 MMHG | WEIGHT: 185.63 LBS | HEIGHT: 64 IN | RESPIRATION RATE: 13 BRPM | OXYGEN SATURATION: 95 % | TEMPERATURE: 97 F | BODY MASS INDEX: 31.69 KG/M2

## 2022-09-02 DIAGNOSIS — R06.00 DYSPNEA, UNSPECIFIED TYPE: ICD-10-CM

## 2022-09-02 DIAGNOSIS — M79.10 MYALGIA: ICD-10-CM

## 2022-09-02 DIAGNOSIS — D53.9 MACROCYTIC ANEMIA: ICD-10-CM

## 2022-09-02 DIAGNOSIS — I48.0 PAF (PAROXYSMAL ATRIAL FIBRILLATION): Primary | Chronic | ICD-10-CM

## 2022-09-02 LAB — BNP SERPL-MCNC: 256 PG/ML (ref 0–99)

## 2022-09-02 PROCEDURE — 3288F PR FALLS RISK ASSESSMENT DOCUMENTED: ICD-10-PCS | Mod: HCNC,CPTII,S$GLB, | Performed by: NURSE PRACTITIONER

## 2022-09-02 PROCEDURE — 1125F PR PAIN SEVERITY QUANTIFIED, PAIN PRESENT: ICD-10-PCS | Mod: HCNC,CPTII,S$GLB, | Performed by: NURSE PRACTITIONER

## 2022-09-02 PROCEDURE — 99999 PR PBB SHADOW E&M-EST. PATIENT-LVL V: CPT | Mod: PBBFAC,HCNC,, | Performed by: NURSE PRACTITIONER

## 2022-09-02 PROCEDURE — 93010 EKG 12-LEAD: ICD-10-PCS | Mod: HCNC,S$GLB,, | Performed by: INTERNAL MEDICINE

## 2022-09-02 PROCEDURE — 82607 VITAMIN B-12: CPT | Mod: HCNC | Performed by: NURSE PRACTITIONER

## 2022-09-02 PROCEDURE — 1125F AMNT PAIN NOTED PAIN PRSNT: CPT | Mod: HCNC,CPTII,S$GLB, | Performed by: NURSE PRACTITIONER

## 2022-09-02 PROCEDURE — 99215 PR OFFICE/OUTPT VISIT, EST, LEVL V, 40-54 MIN: ICD-10-PCS | Mod: HCNC,S$GLB,, | Performed by: NURSE PRACTITIONER

## 2022-09-02 PROCEDURE — 93005 EKG 12-LEAD: ICD-10-PCS | Mod: HCNC,S$GLB,, | Performed by: NURSE PRACTITIONER

## 2022-09-02 PROCEDURE — 36415 COLL VENOUS BLD VENIPUNCTURE: CPT | Mod: HCNC | Performed by: NURSE PRACTITIONER

## 2022-09-02 PROCEDURE — 93010 ELECTROCARDIOGRAM REPORT: CPT | Mod: HCNC,S$GLB,, | Performed by: INTERNAL MEDICINE

## 2022-09-02 PROCEDURE — 99215 OFFICE O/P EST HI 40 MIN: CPT | Mod: HCNC,S$GLB,, | Performed by: NURSE PRACTITIONER

## 2022-09-02 PROCEDURE — 83921 ORGANIC ACID SINGLE QUANT: CPT | Mod: HCNC | Performed by: NURSE PRACTITIONER

## 2022-09-02 PROCEDURE — 83880 ASSAY OF NATRIURETIC PEPTIDE: CPT | Mod: HCNC | Performed by: NURSE PRACTITIONER

## 2022-09-02 PROCEDURE — 1101F PR PT FALLS ASSESS DOC 0-1 FALLS W/OUT INJ PAST YR: ICD-10-PCS | Mod: HCNC,CPTII,S$GLB, | Performed by: NURSE PRACTITIONER

## 2022-09-02 PROCEDURE — 3288F FALL RISK ASSESSMENT DOCD: CPT | Mod: HCNC,CPTII,S$GLB, | Performed by: NURSE PRACTITIONER

## 2022-09-02 PROCEDURE — 1101F PT FALLS ASSESS-DOCD LE1/YR: CPT | Mod: HCNC,CPTII,S$GLB, | Performed by: NURSE PRACTITIONER

## 2022-09-02 PROCEDURE — 99999 PR PBB SHADOW E&M-EST. PATIENT-LVL V: ICD-10-PCS | Mod: PBBFAC,HCNC,, | Performed by: NURSE PRACTITIONER

## 2022-09-02 PROCEDURE — 93005 ELECTROCARDIOGRAM TRACING: CPT | Mod: HCNC,S$GLB,, | Performed by: NURSE PRACTITIONER

## 2022-09-02 RX ORDER — AMIODARONE HYDROCHLORIDE 200 MG/1
200 TABLET ORAL NIGHTLY
Qty: 30 TABLET | Refills: 11 | Status: ON HOLD | OUTPATIENT
Start: 2022-09-02 | End: 2022-11-04 | Stop reason: HOSPADM

## 2022-09-02 NOTE — PROGRESS NOTES
Shirley Metz  09/26/2022  [id number]    Heather Miller NP  Patient Care Team:  Heather Miller NP as PCP - General (Family Medicine)  Wilbert Ware MD as Consulting Physician (Ophthalmology)  Rajinder Quintanilla MD as Consulting Physician (Ophthalmology)  Suzan Gregorio PA-C as Physician Assistant (Rheumatology)  Rosario Valadez MD as Consulting Physician (Dermatology)  Trevon Ramirez MD as Consulting Physician (Cardiology)  Amish Mendoza MD (Pulmonary Disease)  Jaquan Choi MD as Consulting Physician (Vascular Surgery)  Debbie Choi MD (Inactive) as Consulting Physician (Gynecology)  Emmanuel Fish MD as Consulting Physician (Hand Surgery)  PAXTON Chaidez Jr., MD as Consulting Physician (Orthopedic Surgery)  Phylicia Deleon MD as Physician (Internal Medicine)        [facility] Primary Care Note      Chief Complaint:  Chief Complaint   Patient presents with    Dry Mouth    Generalized Body Aches    Sinus Problem       History of Present Illness:  HPI    Ms. Rg is seen today for an acute visit.     Muscle aches, severe from upper back radiating down legs. Onset couple weeks ago, becoming worse. Attributes to medication.   Aching is worse in AM.     Severe oral dryness, mouth/lips/skin/eyes.  H/O corneal problems, she is concerned with taking cardizem.   Hoarse this AM, attributes to dryness.     Some increased edema, alternates furosemide weekly with HCTZ 12.5 mg on other days. Also swelling to chest/abdomen. Feels dyspneic with exertion since yesterday (making bed, ambulating about 100 feet, etc). No associated palpitation.    Taking amiodarone and losartan once daily in evening.   Taking cardizem q 8 hours.     Out of CoQ10 for a couple of weeks.     Recommend CXR, PFTs due to reported dyspnea, h/o multiple pulmonary nodules. Doesn't want CXR and PFTs after discussing risks/benefits.     LOV 8/9/22 - back pain, h/a, dry eyes. On apixaban for CVA prophylaxis.     Appt   Robin 8/17/22 - 89 yoF with persistent AF. I discussed AF and its basic pathophysiology, including its health implications and treatment options with the patient. Specifically, I addressed the need for CVA prophylaxis as well as the goal to reduce symptomatic arrhythmic episodes by pharmacologic and/or procedural methods. She has mildly symptomatic persistent, rate controlled AF. She is not a good candidate for AAD therapy. I discussed options including rate control, re-attempt CV and PVI. Will reassess in 4-6w            Review of Systems   Constitutional:  Negative for fever and malaise/fatigue.   Respiratory:  Positive for shortness of breath. Negative for cough and wheezing.    Cardiovascular:  Positive for leg swelling. Negative for chest pain and orthopnea.   Gastrointestinal:  Negative for abdominal pain, nausea and vomiting.   Genitourinary:  Negative for dysuria and hematuria.   Musculoskeletal:  Positive for myalgias. Negative for falls.   Skin:  Negative for rash.   Neurological:  Negative for dizziness.       The following were reviewed: Active problem list, medication list, allergies, family history, social history, and Health Maintenance.     History:  Past Medical History:   Diagnosis Date    Anemia 11/29/2018    Anxiety 11/28/2017    Arthritis     Atrial fibrillation with RVR 10/9/2019    Back pain     Basal cell carcinoma 03/29/2018    left mid back    Chronic diastolic congestive heart failure 10/15/2019    CKD (chronic kidney disease) stage 3, GFR 30-59 ml/min 8/8/2016    Colon polyps     reported per patient.     Coronary artery calcification 10/5/2021    Fuchs' corneal dystrophy     General anesthetics causing adverse effect in therapeutic use     woke up during surgery and gagged on ET tube    Glaucoma     Glaucoma     Heart failure with preserved left ventricular function (HFpEF) 7/24/2018    Hyperlipidemia     Hypertension     Macular degeneration dry    Obesity     PVD (peripheral vascular  disease) 4/26/2021    PVD (peripheral vascular disease) 4/26/2021    Squamous cell carcinoma 01/08/2019    left shoulder    Stenosis of carotid artery 10/5/2021    Trouble in sleeping     Type 2 diabetes mellitus without complication, without long-term current use of insulin 2/24/2021    Urinary tract infection      Past Surgical History:   Procedure Laterality Date    ADENOIDECTOMY  1938    BREAST BIOPSY Left     1997. benign    BREAST CYST EXCISION Left 1997 approx    CARPAL TUNNEL RELEASE Right 2012 approx    CATARACT EXTRACTION Bilateral 1994    CHOLECYSTECTOMY  1975    CORNEAL TRANSPLANT Bilateral 08/2015 8/2015 - left; Right 4/2016    EYE SURGERY      Fuch's Corneal dystrophy - had 2nd operation with Dr. Quintanilla    EYE SURGERY      Cataract wIOL    left thumb Left 2011    removed cuboid for arthritis    REVERSE TOTAL SHOULDER ARTHROPLASTY Left 8/21/2018    Procedure: ARTHROPLASTY, SHOULDER, TOTAL, REVERSE;  Surgeon: Solomon Lujan MD;  Location: [facility] OR;  Service: Orthopedics;  Laterality: Left;  WITH BICEP TENODESIS    SKIN CANCER EXCISION Left 2017    BCC removal by Dr. Valadez    SLT- OS (aka TRABECULOPLASTY) Left 05/11/2011    repeat 3/14/12    TENOTOMY Left 8/21/2018    Procedure: TENOTOMY;  Surgeon: Solomon Lujan MD;  Location: [facility] OR;  Service: Orthopedics;  Laterality: Left;    TONSILLECTOMY  1938    TREATMENT OF CARDIAC ARRHYTHMIA N/A 10/10/2019    Procedure: CARDIOVERSION;  Surgeon: Pete Durán MD;  Location: [facility] CATH LAB;  Service: Cardiology;  Laterality: N/A;    TREATMENT OF CARDIAC ARRHYTHMIA N/A 12/6/2019    Procedure: CARDIOVERSION;  Surgeon: Samy Castillo MD;  Location: [facility] CATH LAB;  Service: Cardiology;  Laterality: N/A;     Family History   Problem Relation Age of Onset    Heart disease Mother     Hypertension Mother     Cancer Father 88        sarcoma( ?Kaposi's ) on leg    Deep vein thrombosis Neg Hx     Ovarian cancer Neg Hx     Breast cancer Neg Hx     Kidney disease Neg  Hx     Strabismus Neg Hx     Retinal detachment Neg Hx     Macular degeneration Neg Hx     Glaucoma Neg Hx     Blindness Neg Hx     Amblyopia Neg Hx     Eczema Neg Hx     Lupus Neg Hx     Melanoma Neg Hx     Psoriasis Neg Hx     Diabetes Neg Hx     Stroke Neg Hx     Mental retardation Neg Hx     Mental illness Neg Hx     Hyperlipidemia Neg Hx     COPD Neg Hx     Asthma Neg Hx     Depression Neg Hx     Alcohol abuse Neg Hx     Drug abuse Neg Hx      Social History     Socioeconomic History    Marital status:     Number of children: 3   Tobacco Use    Smoking status: Never    Smokeless tobacco: Never    Tobacco comments:     history of passive smoking from her ex-   Substance and Sexual Activity    Alcohol use: Yes     Alcohol/week: 2.0 standard drinks     Types: 2 Standard drinks or equivalent per week     Comment: occ    Drug use: No    Sexual activity: Not Currently     Partners: Male     Birth control/protection: Post-menopausal     Comment: discussed protection for STD's   Other Topics Concern    Are you pregnant or think you may be? No    Breast-feeding No   Social History Narrative     x 2,  x 1. Retired artist - previously doing jewelry/wall pieces; ; lives in [facility]; has 3 sons - 52( lives in [place], 54, and 56;exercises minimally - limited due to back issues. Still drives. Does have a Living Will.     Social Determinants of Health     Financial Resource Strain: Low Risk     Difficulty of Paying Living Expenses: Not hard at all   Food Insecurity: No Food Insecurity    Worried About Running Out of Food in the Last Year: Never true    Ran Out of Food in the Last Year: Never true   Transportation Needs: No Transportation Needs    Lack of Transportation (Medical): No    Lack of Transportation (Non-Medical): No   Physical Activity: Insufficiently Active    Days of Exercise per Week: 3 days    Minutes of Exercise per Session: 10 min   Stress: No Stress  Concern Present    Feeling of Stress : Not at all   Social Connections: Moderately Isolated    Frequency of Communication with Friends and Family: Three times a week    Frequency of Social Gatherings with Friends and Family: Three times a week    Attends Jehovah's witness Services: Never    Active Member of Clubs or Organizations: Yes    Attends Club or Organization Meetings: More than 4 times per year    Marital Status:    Housing Stability: Low Risk     Unable to Pay for Housing in the Last Year: No    Number of Places Lived in the Last Year: 1    Unstable Housing in the Last Year: No     Patient Active Problem List   Diagnosis    HTN (hypertension)    Mixed hyperlipidemia    Primary osteoarthritis involving multiple joints    Macular degeneration - Both Eyes    Fuch's endothelial dystrophy - Both Eyes    Lens replaced by other means    COAG (chronic open-angle glaucoma) - Both Eyes    Blepharitis, unspecified - Both Eyes    Shortness of breath    Abnormal ECG    PVC's (premature ventricular contractions)    Other microscopic hematuria    LVH (left ventricular hypertrophy) due to hypertensive disease    Osteopenia of hip    Myalgia    Calcification of aorta    Neuropathy    Unequal blood pressures in paired extremities    Medication side effects    History of cornea transplant    Pseudophakia    Insomnia    Anxiety    Pre-diabetes    Obesity (BMI 30.0-34.9)    Elevated troponin    Bilateral shoulder region arthritis    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    Anemia    Thrombus of left atrial appendage    PAF (paroxysmal atrial fibrillation)    Elevated liver enzymes    Atrial flutter with rapid ventricular response    Chronic anticoagulation    Keratitis sicca, bilateral    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    PAD (peripheral artery disease)    Statin intolerance    Pulmonary nodules/lesions, multiple    Spinal stenosis at L4-L5 level    Pulmonary granuloma     Lumbar spondylosis    Bilateral recurrent inguinal hernia without obstruction or gangrene    LLQ abdominal pain    Pain in left leg    Rectal abnormality    Stenosis of carotid artery    Carotid artery calcification, bilateral    B12 deficiency    Seasonal allergic rhinitis    Atrial fibrillation with RVR    Elevated serum creatinine    Coagulopathy     Review of patient's allergies indicates:   Allergen Reactions    Carvedilol Swelling     lips  lips  Other reaction(s): swelling    Cephalexin Other (See Comments)     Muscle/joint weakness unable to move     Doxycycline calcium Other (See Comments)     Weakness nausea   sob  Other reaction(s): weakness, nausea, SOB    Erythromycin Other (See Comments)     Complete weakness/could not walk    Gadolinium-containing contrast media Other (See Comments)     angioedema  Other reaction(s): Angioedema    Hydrocodone-acetaminophen      wheezing  wheezing  wheezing  Other reaction(s): wheezing    Inveltys [loteprednol etabonate] Palpitations and Other (See Comments)     Patient stated bp went up.    Labetalol Shortness Of Breath, Nausea Only and Other (See Comments)     Palpitations, LE weakness  Other reaction(s): SOB, palpitations, weakness    Loratadine Other (See Comments)     Double vision/spots  Other reaction(s): double vision    Macrolide antibiotics Other (See Comments)     Complete weakness/could not walk  Complete weakness/could not walk  Complete weakness/could not walk  Complete weakness/could not walk  Other reaction(s): complete weakness    Moxifloxacin Other (See Comments)     Hives   muscle soreness  Other reaction(s): hives, muscle soreness    Naproxen      wheezing  wheezing  wheezing  Other reaction(s): wheezing    Statins-hmg-coa reductase inhibitors Other (See Comments)     Severe Muscle weakness  Muscle weakness  Severe Muscle weakness  Other reaction(s): severe muscle weakness    Timolol Other (See Comments)     Severe muscle weakness, eye  problems.  Other reaction(s): severe muscle weakness    Verapamil Diarrhea     Severe diarrhea.  Other reaction(s): diarrhea    Amlodipine      Myalgias    Other reaction(s): myalgian    Doxycycline     Doxycycline hyclate      Other reaction(s): weakness, nausea, SOB    Fenofibrate      Causes muscle weakness  Other reaction(s): muscle weakness    Hydralazine      Other reaction(s): muscle tremors    Hydralazine analogues      Muscle tremors/aches      Hydrocortisone     Isosorbide      Tolerates Imdur    Loteprednol      Other reaction(s): increased BP    Tetanus and diphtheria toxoids     Adhesive Rash     Clonidine patch - 2 mfg - contact dermatitis    Codeine Diarrhea and Other (See Comments)     Loss of balance   Other reaction(s): loss of balance    Doxazosin Palpitations     Other reaction(s): palpitations    Fexofenadine Other (See Comments)     Double vision/spots   Other reaction(s): double vision    Hydrocortisone (bulk) Other (See Comments)     Only suppository/ caused muscle weakness    Latanoprost Other (See Comments)     Muscle weakness  Other reaction(s): muscle weakness    Olopatadine Other (See Comments)     Muscle weakness  Other reaction(s): muscle weakness    Prednisone Anxiety       Medications:  Current Outpatient Medications on File Prior to Visit   Medication Sig Dispense Refill    acetaminophen (TYLENOL) 500 MG tablet Take 500 mg by mouth daily as needed. Taking 1 to 3      ALPRAZolam (XANAX) 0.25 MG tablet Take 1 tablet (0.25 mg total) by mouth nightly as needed for Insomnia or Anxiety. 30 tablet 2    apixaban (ELIQUIS) 2.5 mg Tab Take 1 tablet (2.5 mg total) by mouth 2 (two) times daily. 180 tablet 3    b complex vitamins capsule Take 1 capsule by mouth once daily.      cholecalciferol, vitamin D3, (VITAMIN D3) 50 mcg (2,000 unit) Cap Take 1 capsule by mouth once daily.      coenzyme Q10 200 mg capsule Take 400 mg by mouth once daily.       diltiaZEM (CARDIZEM) 60 MG tablet Take 1 tablet  (60 mg total) by mouth every 8 (eight) hours. 270 tablet 1    fish oil-omega-3 fatty acids 300-1,000 mg capsule Take 2 g by mouth 2 (two) times daily.       furosemide (LASIX) 40 MG tablet Take 1 tablet (40 mg total) by mouth daily as needed (edema, swelling, fluid). 60 tablet 3    hydroCHLOROthiazide (HYDRODIURIL) 25 MG tablet Take 1 tablet (25 mg total) by mouth once daily. 90 tablet 1    losartan (COZAAR) 25 MG tablet Take 1 tablet (25 mg total) by mouth once daily. 180 tablet 1    metoprolol tartrate (LOPRESSOR) 25 MG tablet Take 1 tablet (25 mg total) by mouth 2 (two) times daily. 180 tablet 1    POLYETHYLENE GLYCOL 3350 (MIRALAX ORAL) Take 17 g by mouth 2 (two) times a day. Taking BID      RHOPRESSA 0.02 % ophthalmic solution PLACE 1 DROP INTO THE LEFT EYE EVERY EVENING. 2.5 mL 11    travoprost (TRAVATAN Z) 0.004 % ophthalmic solution PLACE 1 DROP INTO THE LEFT EYE EVERY EVENING. 2.5 mL 10    mupirocin (BACTROBAN) 2 % ointment apply to affected area twice a day with Q-tip. Antibiotic ointment. 30 g 1    [DISCONTINUED] cloNIDine (CATAPRES) 0.1 MG tablet Take 1 tablet (0.1 mg total) by mouth 2 (two) times daily as needed (systolic BP over 180). 20 tablet 1    [DISCONTINUED] isosorbide mononitrate (IMDUR) 30 MG 24 hr tablet Take 1 tablet (30 mg total) by mouth every evening. 90 tablet 1     Current Facility-Administered Medications on File Prior to Visit   Medication Dose Route Frequency Provider Last Rate Last Admin    sodium chloride 0.9% flush 3 mL  3 mL Intravenous PRN Steve Galarza PA-C           Medications have been reviewed and reconciled with patient at visit today.    Barriers to medications present (yes )    Adverse reactions to current medications (no)      Exam:  Vitals:    09/02/22 1057   BP: 130/68   Pulse: 81   Resp: 13   Temp: 97.1 °F (36.2 °C)     Weight: 84.2 kg (185 lb 10 oz)   Body mass index is 31.86 kg/m².      BP Readings from Last 3 Encounters:   09/21/22 138/80   09/21/22 120/62    09/15/22 118/68     Wt Readings from Last 3 Encounters:   09/21/22 1100 84.4 kg (186 lb 1.1 oz)   09/21/22 0912 84.4 kg (186 lb 1.1 oz)   09/15/22 1153 85 kg (187 lb 6.3 oz)            Physical Exam  Constitutional:       General: She is not in acute distress.  HENT:      Nose: Nose normal.      Mouth/Throat:      Mouth: Mucous membranes are moist.      Pharynx: No oropharyngeal exudate or posterior oropharyngeal erythema.   Eyes:      General: No scleral icterus.  Cardiovascular:      Rate and Rhythm: Normal rate. Rhythm irregular.   Pulmonary:      Effort: Pulmonary effort is normal. No respiratory distress.      Breath sounds: Normal breath sounds. No stridor. No wheezing, rhonchi or rales.   Abdominal:      Palpations: Abdomen is soft.      Tenderness: There is no abdominal tenderness.   Musculoskeletal:         General: No swelling.      Cervical back: Neck supple.   Skin:     General: Skin is warm and dry.   Neurological:      Mental Status: She is alert. Mental status is at baseline.   Psychiatric:         Mood and Affect: Mood normal.         Behavior: Behavior normal.         Thought Content: Thought content normal.       Laboratory Reviewed: (Yes)  Lab Results   Component Value Date    WBC 8.41 08/31/2022    HGB 11.6 (L) 08/31/2022    HCT 37.3 08/31/2022     08/31/2022    CHOL 168 08/31/2022    TRIG 98 08/31/2022    HDL 43 08/31/2022    ALT 13 08/31/2022    AST 18 08/31/2022     08/31/2022    K 4.1 08/31/2022     08/31/2022    CREATININE 1.5 (H) 08/31/2022    BUN 26 (H) 08/31/2022    CO2 23 08/31/2022    TSH 3.826 08/31/2022    INR 1.0 02/12/2020    HGBA1C 6.2 (H) 08/31/2022           Health Maintenance  Health Maintenance Topics with due status: Not Due       Topic Last Completion Date    Pneumococcal Vaccines (Age 65+) 03/16/2022    Lipid Panel 08/31/2022     Health Maintenance Due   Topic Date Due    Shingles Vaccine (1 of 2) Never done    TETANUS VACCINE  11/09/2021    COVID-19  Vaccine (4 - Booster for Pfizer series) 11/24/2021    Influenza Vaccine (1) 09/01/2022       Assessment:  Problem List Items Addressed This Visit          Cardiac/Vascular    PAF (paroxysmal atrial fibrillation) - Primary (Chronic)     EKG today, rate controlled. Followed closely by cardiology.   To ER if dyspnea worsens.           Relevant Orders    IN OFFICE EKG 12-LEAD (to [place]) (Completed)       Orthopedic    Myalgia     Other Visit Diagnoses       Macrocytic anemia        Relevant Orders    Methylmalonic Acid, Serum (Completed)    VITAMIN B12 (Completed)    Dyspnea, unspecified type        Exam at baseline. Recommend CXR and PFTs but pt declines. Encouraged to schedule f/u with Dr Mendoza.    Relevant Orders    B-TYPE NATRIURETIC PEPTIDE (Completed)              Plan:  PAF (paroxysmal atrial fibrillation)  -     IN OFFICE EKG 12-LEAD (to [place])    Macrocytic anemia  -     Methylmalonic Acid, Serum; Future; Expected date: 09/02/2022  -     VITAMIN B12; Future; Expected date: 09/02/2022    Dyspnea, unspecified type  Comments:  Exam at baseline. Recommend CXR and PFTs but pt declines. Encouraged to schedule f/u with Dr Mendoza.  Orders:  -     B-TYPE NATRIURETIC PEPTIDE; Future; Expected date: 09/02/2022    Myalgia  Comments:  Resume CoQ10. RTC if no improvement.    Other orders  -     amiodarone (PACERONE) 200 MG Tab; Take 1 tablet (200 mg total) by mouth every evening. After 4 weeks continue amiodarone 200mg daily  Dispense: 30 tablet; Refill: 11    -Patient's lab results were reviewed and discussed with patient  -Treatment options and alternatives were discussed with the patient. Patient expressed understanding. Patient was given the opportunity to ask questions and be an active participant in their medical care. Patient had no further questions or concerns at this time.   -Documentation of patient's health and condition was obtained from family member who was present during visit.  -Patient is an overall moderate  risk for health complications from their medical conditions.       Follow up: Follow up in about 2 weeks (around 9/16/2022).      After visit summary printed and given to patient upon discharge.  Patient goals and care plan are included in After visit summary.    Total medical decision making time was 57 min.  The following issues were discussed: The primary encounter diagnosis was PAF (paroxysmal atrial fibrillation). Diagnoses of Macrocytic anemia, Dyspnea, unspecified type, and Myalgia were also pertinent to this visit.    Health maintenance needs, recent test results and goals of care discussed with pt and questions answered.

## 2022-09-02 NOTE — PATIENT INSTRUCTIONS
If heart rate (pulse) is less than 90 then okay to decrease cardizem to twice daily.     Discuss dry eyes with Dr Ware. Continue Systane.

## 2022-09-02 NOTE — TELEPHONE ENCOUNTER
----- Message from Vernell Medina sent at 9/2/2022  7:39 AM CDT -----  .Type:  Same Day Appointment Request    Caller is requesting a same day appointment.  Caller declined first available appointment listed below.    Name of Caller: self  When is the first available appointment? 10/20  Symptoms: bad reaction to medication - level 8 body pain, nauseated, mouth dry  Best Call Back Number:.[phone]

## 2022-09-02 NOTE — TELEPHONE ENCOUNTER
Pt stated she needed to see someone ASAP for issues with nausea, dry mouth and possible reaction to medication. She has appt today with PCP at 11am.

## 2022-09-03 LAB — VIT B12 SERPL-MCNC: 557 PG/ML (ref 210–950)

## 2022-09-06 ENCOUNTER — OFFICE VISIT (OUTPATIENT)
Dept: NEPHROLOGY | Facility: CLINIC | Age: 87
End: 2022-09-06
Payer: COMMERCIAL

## 2022-09-06 VITALS
RESPIRATION RATE: 20 BRPM | DIASTOLIC BLOOD PRESSURE: 78 MMHG | BODY MASS INDEX: 31.58 KG/M2 | WEIGHT: 185 LBS | SYSTOLIC BLOOD PRESSURE: 150 MMHG | HEIGHT: 64 IN | HEART RATE: 72 BPM

## 2022-09-06 DIAGNOSIS — N18.30 STAGE 3 CHRONIC KIDNEY DISEASE, UNSPECIFIED WHETHER STAGE 3A OR 3B CKD: Primary | ICD-10-CM

## 2022-09-06 PROCEDURE — 1159F PR MEDICATION LIST DOCUMENTED IN MEDICAL RECORD: ICD-10-PCS | Mod: HCNC,CPTII,S$GLB, | Performed by: INTERNAL MEDICINE

## 2022-09-06 PROCEDURE — 99215 OFFICE O/P EST HI 40 MIN: CPT | Mod: HCNC,S$GLB,, | Performed by: INTERNAL MEDICINE

## 2022-09-06 PROCEDURE — 3288F FALL RISK ASSESSMENT DOCD: CPT | Mod: HCNC,CPTII,S$GLB, | Performed by: INTERNAL MEDICINE

## 2022-09-06 PROCEDURE — 99999 PR PBB SHADOW E&M-EST. PATIENT-LVL IV: ICD-10-PCS | Mod: PBBFAC,HCNC,, | Performed by: INTERNAL MEDICINE

## 2022-09-06 PROCEDURE — 99215 PR OFFICE/OUTPT VISIT, EST, LEVL V, 40-54 MIN: ICD-10-PCS | Mod: HCNC,S$GLB,, | Performed by: INTERNAL MEDICINE

## 2022-09-06 PROCEDURE — 1101F PT FALLS ASSESS-DOCD LE1/YR: CPT | Mod: HCNC,CPTII,S$GLB, | Performed by: INTERNAL MEDICINE

## 2022-09-06 PROCEDURE — 99999 PR PBB SHADOW E&M-EST. PATIENT-LVL IV: CPT | Mod: PBBFAC,HCNC,, | Performed by: INTERNAL MEDICINE

## 2022-09-06 PROCEDURE — 3288F PR FALLS RISK ASSESSMENT DOCUMENTED: ICD-10-PCS | Mod: HCNC,CPTII,S$GLB, | Performed by: INTERNAL MEDICINE

## 2022-09-06 PROCEDURE — 1159F MED LIST DOCD IN RCRD: CPT | Mod: HCNC,CPTII,S$GLB, | Performed by: INTERNAL MEDICINE

## 2022-09-06 PROCEDURE — 1101F PR PT FALLS ASSESS DOC 0-1 FALLS W/OUT INJ PAST YR: ICD-10-PCS | Mod: HCNC,CPTII,S$GLB, | Performed by: INTERNAL MEDICINE

## 2022-09-06 NOTE — PROGRESS NOTES
Renal clinic f/u note  Shirley Metz is a 89 y.o. female for whom nephrology consult has been requested to evaluate and give opinion.   Date of clinic visit: 9/6/22  Reason for f/u and chief c/o: CKD and HTN    HPI: Pt was seen and examined. Labs and meds reviewed. Discussed with other providers. Pt is a 88 y/o female with CKD stage 3 and HTN who presents for f/u. Pt is new to me today, she was seen by Dr. Cortez in the past. Medical records, labs, and meds reviewed with pt. She has no ne c/o's, no CP, no SOB, no leg swelling. Pt does have a h/o of AF, for which she is following with cardiology.    PAST MEDICAL HISTORY: CKD stage 3, HTN, DM-2, diastolic CHF, atrial fibrillation, Arthritis, Basal cell carcinoma (03/29/2018), Colon polyps, Fuchs' corneal dystrophy, Glaucoma, Hyperlipidemia, Macular degeneration (dry), Obesity, PVD (peripheral vascular disease), Squamous cell carcinoma (01/08/2019), Stenosis of carotid artery (10/5/2021), Urinary tract infection.    PAST SURGICAL HISTORY:  She  has a past surgical history that includes Carpal tunnel release (Right, 2012 approx); left thumb (Left, 2011); Cataract extraction (Bilateral, 1994); Corneal transplant (Bilateral, 08/2015); SLT- OS (aka TRABECULOPLASTY) (Left, 05/11/2011); Breast cyst excision (Left, 1997 approx); Tonsillectomy (1938); Adenoidectomy (1938); Cholecystectomy (1975); Eye surgery; Eye surgery; Breast biopsy (Left); Reverse total shoulder arthroplasty (Left, 8/21/2018); Tenotomy (Left, 8/21/2018); Skin cancer excision (Left, 2017); Treatment of cardiac arrhythmia (N/A, 10/10/2019); and Treatment of cardiac arrhythmia (N/A, 12/6/2019).    SOCIAL HISTORY:  She  reports that she has never smoked. She has never used smokeless tobacco. She reports current alcohol use of about 2.0 standard drinks per week. She reports that she does not use drugs.    FAMILY MEDICAL HISTORY:  Her family history includes Cancer (age of onset: 88) in her father;  Heart disease in her mother; Hypertension in her mother.    Review of patient's allergies indicates:   Allergen Reactions    Carvedilol Swelling     lips  lips  Other reaction(s): swelling    Cephalexin Other (See Comments)     Muscle/joint weakness unable to move     Doxycycline calcium Other (See Comments)     Weakness nausea   sob  Other reaction(s): weakness, nausea, SOB    Erythromycin Other (See Comments)     Complete weakness/could not walk    Gadolinium-containing contrast media Other (See Comments)     angioedema  Other reaction(s): Angioedema    Hydrocodone-acetaminophen      wheezing  wheezing  wheezing  Other reaction(s): wheezing    Inveltys [loteprednol etabonate] Palpitations and Other (See Comments)     Patient stated bp went up.    Labetalol Shortness Of Breath, Nausea Only and Other (See Comments)     Palpitations, LE weakness  Other reaction(s): SOB, palpitations, weakness    Loratadine Other (See Comments)     Double vision/spots  Other reaction(s): double vision    Macrolide antibiotics Other (See Comments)     Complete weakness/could not walk  Complete weakness/could not walk  Complete weakness/could not walk  Complete weakness/could not walk  Other reaction(s): complete weakness    Moxifloxacin Other (See Comments)     Hives   muscle soreness  Other reaction(s): hives, muscle soreness    Naproxen      wheezing  wheezing  wheezing  Other reaction(s): wheezing    Statins-hmg-coa reductase inhibitors Other (See Comments)     Severe Muscle weakness  Muscle weakness  Severe Muscle weakness  Other reaction(s): severe muscle weakness    Timolol Other (See Comments)     Severe muscle weakness, eye problems.  Other reaction(s): severe muscle weakness    Verapamil Diarrhea     Severe diarrhea.  Other reaction(s): diarrhea    Amlodipine      Myalgias    Other reaction(s): myalgian    Doxycycline     Doxycycline hyclate      Other reaction(s): weakness, nausea, SOB    Fenofibrate      Causes muscle  weakness  Other reaction(s): muscle weakness    Hydralazine      Other reaction(s): muscle tremors    Hydralazine analogues      Muscle tremors/aches      Hydrocortisone     Isosorbide      Tolerates Imdur    Loteprednol      Other reaction(s): increased BP    Tetanus and diphtheria toxoids     Adhesive Rash     Clonidine patch - 2 mfg - contact dermatitis    Codeine Diarrhea and Other (See Comments)     Loss of balance   Other reaction(s): loss of balance    Doxazosin Palpitations     Other reaction(s): palpitations    Fexofenadine Other (See Comments)     Double vision/spots   Other reaction(s): double vision    Hydrocortisone (bulk) Other (See Comments)     Only suppository/ caused muscle weakness    Latanoprost Other (See Comments)     Muscle weakness  Other reaction(s): muscle weakness    Olopatadine Other (See Comments)     Muscle weakness  Other reaction(s): muscle weakness    Prednisone Anxiety           Prior to Admission medications    Medication Sig Start Date End Date Taking? Authorizing Provider   acetaminophen (TYLENOL) 500 MG tablet Take 500 mg by mouth daily as needed. Taking 1 to 3   Yes Historical Provider   ALPRAZolam (XANAX) 0.25 MG tablet Take 1 tablet (0.25 mg total) by mouth nightly as needed for Insomnia or Anxiety. 7/28/22 10/26/22 Yes Heather Miller NP   amiodarone (PACERONE) 200 MG Tab Take 1 tablet (200 mg total) by mouth every evening. After 4 weeks continue amiodarone 200mg daily 9/2/22 9/2/23 Yes Heather Miller NP   apixaban (ELIQUIS) 2.5 mg Tab Take 1 tablet (2.5 mg total) by mouth 2 (two) times daily. 3/24/22  Yes Trevon Ramirez MD   b complex vitamins capsule Take 1 capsule by mouth once daily.   Yes Historical Provider   cholecalciferol, vitamin D3, (VITAMIN D3) 50 mcg (2,000 unit) Cap Take 1 capsule by mouth once daily.   Yes Historical Provider   coenzyme Q10 200 mg capsule Take 400 mg by mouth once daily.    Yes Historical Provider   diltiaZEM (CARDIZEM) 60 MG tablet Take  "1 tablet (60 mg total) by mouth every 8 (eight) hours. 7/19/22 7/19/23 Yes Trevon Ramirez MD   fish oil-omega-3 fatty acids 300-1,000 mg capsule Take 2 g by mouth 2 (two) times daily.    Yes Historical Provider   furosemide (LASIX) 40 MG tablet Take 1 tablet (40 mg total) by mouth daily as needed (edema, swelling, fluid). 7/26/22 7/26/23 Yes Trevon Ramirez MD   hydroCHLOROthiazide (HYDRODIURIL) 25 MG tablet Take 1 tablet (25 mg total) by mouth once daily. 7/26/22 7/26/23 Yes Trevon Ramirez MD   losartan (COZAAR) 25 MG tablet Take 1 tablet (25 mg total) by mouth once daily. 7/14/22  Yes Nasrin Christiansen MD   metoprolol tartrate (LOPRESSOR) 25 MG tablet Take 1 tablet (25 mg total) by mouth 2 (two) times daily. 7/19/22 7/19/23 Yes Trevon Ramirez MD   mupirocin (BACTROBAN) 2 % ointment apply to affected area twice a day with Q-tip. Antibiotic ointment. 5/11/22  Yes Rosario Valadez MD   POLYETHYLENE GLYCOL 3350 (MIRALAX ORAL) Take 17 g by mouth 2 (two) times a day. Taking BID   Yes Historical Provider   RHOPRESSA 0.02 % ophthalmic solution PLACE 1 DROP INTO THE LEFT EYE EVERY EVENING. 8/10/22  Yes Toi Briseno MD   travoprost (TRAVATAN Z) 0.004 % ophthalmic solution PLACE 1 DROP INTO THE LEFT EYE EVERY EVENING. 1/19/22  Yes Toi Briseno MD   cloNIDine (CATAPRES) 0.1 MG tablet Take 1 tablet (0.1 mg total) by mouth 2 (two) times daily as needed (systolic BP over 180). 4/25/22 7/14/22  Heather Miller, VINITA   isosorbide mononitrate (IMDUR) 30 MG 24 hr tablet Take 1 tablet (30 mg total) by mouth every evening. 6/15/22 7/14/22  Trevon Ramirez MD        REVIEW OF SYSTEMS:  Patient has no fever, fatigue, visual changes, chest pain, edema, cough, dyspnea, nausea, vomiting, constipation, diarrhea, arthralgias, pruritis, dizziness, weakness, depression, confusion.    [unfilled]    PHYSICAL EXAM:   height is 5' 4" (1.626 m) and weight is 83.9 kg (185 lb). Her blood pressure is 150/78 (abnormal) and her pulse is 72. Her respiration " is 20.   Gen: WDWN female in no apparent distress  Psych: Normal mood and affect  Skin: No rashes or ulcers  Eyes: Normal conjunctiva and lids, PERRLA  ENT: Normal hearing with no oropharyngeal lesions  Neck: No JVD  Chest: Clear with no rales, rhonchi, wheezing with normal effort  CV: Irregular, no murmurs, gallops or rubs  Abd: Soft, nontender, no distension, positive bowel sounds  Ext: No cyanosis, clubbing or edema    Labs reviewed:  BMP  Lab Results   Component Value Date     08/31/2022    K 4.1 08/31/2022     08/31/2022    CO2 23 08/31/2022    BUN 26 (H) 08/31/2022    CREATININE 1.5 (H) 08/31/2022    CALCIUM 9.4 08/31/2022    ANIONGAP 11 08/31/2022    ESTGFRAFRICA 30 (A) 07/26/2022    EGFRNONAA 26 (A) 07/26/2022     Lab Results   Component Value Date    WBC 8.41 08/31/2022    HGB 11.6 (L) 08/31/2022    HCT 37.3 08/31/2022     (H) 08/31/2022     08/31/2022       Lab Results   Component Value Date    PTH 76.8 09/18/2020    CALCIUM 9.4 08/31/2022    PHOS 3.6 04/26/2022     HgA1c 6.2%  U/a: trace protein, no blood, no casts  Urine p/Cr 180 mg      IMPRESSION AND RECOMMENDATIONS: 89 y/o female with CKD stage 3 presents for f/u:    Renal: s Cr stable, at baseline, noted slightly lower than before  CKD stage 3, stable renal function  H/o of DM and HTN  Microalbuminuria, likely diabetic nephropathy. Is on losartan  K normal  Na normal  Acid base no issues  Ca normal  PO4 normal  iPTH normal  Anemia, mild    2. HTN: BP controlled.  Medes reviewed    3. Cardiac: cardiac issues reviewed with pt  H/o of diastolic CHF. Well compensated  H/o of AF. Is in rate controlled AF.    Plans and recommendations:  As discussed above  Total time spent 40 minutes including time needed to review the records, the   patient evaluation, documentation, face-to-face discussion with the patient,   more than 50% of the time was spent on coordination of care and counseling.    Level V visit.  RTC 1 year    Paulie Newman,  MD

## 2022-09-07 LAB — METHYLMALONATE SERPL-SCNC: 0.36 UMOL/L

## 2022-09-15 ENCOUNTER — OFFICE VISIT (OUTPATIENT)
Dept: PRIMARY CARE CLINIC | Facility: CLINIC | Age: 87
End: 2022-09-15
Payer: COMMERCIAL

## 2022-09-15 ENCOUNTER — TELEPHONE (OUTPATIENT)
Dept: INTERNAL MEDICINE | Facility: CLINIC | Age: 87
End: 2022-09-15
Payer: MEDICARE

## 2022-09-15 ENCOUNTER — HOSPITAL ENCOUNTER (OUTPATIENT)
Dept: RADIOLOGY | Facility: HOSPITAL | Age: 87
Discharge: HOME | End: 2022-09-15
Attending: INTERNAL MEDICINE
Payer: COMMERCIAL

## 2022-09-15 VITALS
DIASTOLIC BLOOD PRESSURE: 68 MMHG | BODY MASS INDEX: 31.99 KG/M2 | OXYGEN SATURATION: 92 % | RESPIRATION RATE: 20 BRPM | SYSTOLIC BLOOD PRESSURE: 118 MMHG | HEART RATE: 73 BPM | WEIGHT: 187.38 LBS | HEIGHT: 64 IN | TEMPERATURE: 99 F

## 2022-09-15 DIAGNOSIS — R06.02 SHORTNESS OF BREATH: ICD-10-CM

## 2022-09-15 DIAGNOSIS — I48.0 PAF (PAROXYSMAL ATRIAL FIBRILLATION): Chronic | ICD-10-CM

## 2022-09-15 DIAGNOSIS — R06.02 SHORTNESS OF BREATH: Primary | ICD-10-CM

## 2022-09-15 LAB
CTP QC/QA: YES
SARS-COV-2 RDRP RESP QL NAA+PROBE: NEGATIVE

## 2022-09-15 PROCEDURE — 99999 PR PBB SHADOW E&M-EST. PATIENT-LVL V: CPT | Mod: PBBFAC,HCNC,, | Performed by: INTERNAL MEDICINE

## 2022-09-15 PROCEDURE — 71046 XR CHEST PA AND LATERAL: ICD-10-PCS | Mod: 26,HCNC,, | Performed by: RADIOLOGY

## 2022-09-15 PROCEDURE — 3288F PR FALLS RISK ASSESSMENT DOCUMENTED: ICD-10-PCS | Mod: HCNC,CPTII,S$GLB, | Performed by: INTERNAL MEDICINE

## 2022-09-15 PROCEDURE — U0002 COVID-19 LAB TEST NON-CDC: HCPCS | Mod: QW,HCNC,S$GLB, | Performed by: INTERNAL MEDICINE

## 2022-09-15 PROCEDURE — 1159F MED LIST DOCD IN RCRD: CPT | Mod: HCNC,CPTII,S$GLB, | Performed by: INTERNAL MEDICINE

## 2022-09-15 PROCEDURE — 71046 X-RAY EXAM CHEST 2 VIEWS: CPT | Mod: 26,HCNC,, | Performed by: RADIOLOGY

## 2022-09-15 PROCEDURE — 1125F PR PAIN SEVERITY QUANTIFIED, PAIN PRESENT: ICD-10-PCS | Mod: HCNC,CPTII,S$GLB, | Performed by: INTERNAL MEDICINE

## 2022-09-15 PROCEDURE — 3288F FALL RISK ASSESSMENT DOCD: CPT | Mod: HCNC,CPTII,S$GLB, | Performed by: INTERNAL MEDICINE

## 2022-09-15 PROCEDURE — 99215 OFFICE O/P EST HI 40 MIN: CPT | Mod: HCNC,S$GLB,, | Performed by: INTERNAL MEDICINE

## 2022-09-15 PROCEDURE — 1125F AMNT PAIN NOTED PAIN PRSNT: CPT | Mod: HCNC,CPTII,S$GLB, | Performed by: INTERNAL MEDICINE

## 2022-09-15 PROCEDURE — 99215 PR OFFICE/OUTPT VISIT, EST, LEVL V, 40-54 MIN: ICD-10-PCS | Mod: HCNC,S$GLB,, | Performed by: INTERNAL MEDICINE

## 2022-09-15 PROCEDURE — U0002: ICD-10-PCS | Mod: QW,HCNC,S$GLB, | Performed by: INTERNAL MEDICINE

## 2022-09-15 PROCEDURE — 71046 X-RAY EXAM CHEST 2 VIEWS: CPT | Mod: TC,HCNC

## 2022-09-15 PROCEDURE — 99999 PR PBB SHADOW E&M-EST. PATIENT-LVL V: ICD-10-PCS | Mod: PBBFAC,HCNC,, | Performed by: INTERNAL MEDICINE

## 2022-09-15 PROCEDURE — 1101F PR PT FALLS ASSESS DOC 0-1 FALLS W/OUT INJ PAST YR: ICD-10-PCS | Mod: HCNC,CPTII,S$GLB, | Performed by: INTERNAL MEDICINE

## 2022-09-15 PROCEDURE — 1159F PR MEDICATION LIST DOCUMENTED IN MEDICAL RECORD: ICD-10-PCS | Mod: HCNC,CPTII,S$GLB, | Performed by: INTERNAL MEDICINE

## 2022-09-15 PROCEDURE — 1101F PT FALLS ASSESS-DOCD LE1/YR: CPT | Mod: HCNC,CPTII,S$GLB, | Performed by: INTERNAL MEDICINE

## 2022-09-15 NOTE — ASSESSMENT & PLAN NOTE
With symptoms and CXR findings suggestive of CHF at present - suspect secondary to cardiac arrhythmia (recent echocardiogram 7/12 actually w normal systolic and diastolic function though PAP > 30) recommend increase diuretic over next few days - if no improvement or if O2 sats 88 or less advised to go to ED (call 911) - inbox message sent to cardiology as well

## 2022-09-15 NOTE — ASSESSMENT & PLAN NOTE
CXR findings consistent w CHF - low grade temp and URI symptoms noted also however - rapid CoVid test negative - recommend increase diuresis over coming days but consider repeat home CoVid test in a couple of days - if symptoms worsen or O2 sats 88% or less advised to go to ED/call 911.

## 2022-09-15 NOTE — PROGRESS NOTES
"Shirley Metz  09/15/2022  [id number]    Heather Miller NP  Patient Care Team:  Heather Miller NP as PCP - General (Family Medicine)  Wilbert Ware MD as Consulting Physician (Ophthalmology)  Rajinder Quintanilla MD as Consulting Physician (Ophthalmology)  Suzan Gregorio PA-C as Physician Assistant (Rheumatology)  Rosario Valadez MD as Consulting Physician (Dermatology)  Trevon Ramirez MD as Consulting Physician (Cardiology)  Amish Mendoza MD (Pulmonary Disease)  Jaquan Choi MD as Consulting Physician (Vascular Surgery)  Debbie Choi MD (Inactive) as Consulting Physician (Gynecology)  Emmanuel Fish MD as Consulting Physician (Hand Surgery)  PAXTON Chaidez Jr., MD as Consulting Physician (Orthopedic Surgery)  Phylicia Deleon MD as Physician (Internal Medicine)    Visit Type:an urgent visit for a new problem    Chief Complaint:  Chief Complaint   Patient presents with    Shortness of Breath    Cough     Cough, "breathlessness", increased SOB on exertion, dry mouth, elevated heart rate of 110 this morning, generalized weakness.    History of Present Illness:   Ms. Edmond is an 89 year old female w multiple chronic medical issues including Diastolic CHF, HTN, atherosclerosis, CKD, Afib on Eliquis, obesity, pre-diabetes, osteopenia, and OA. Recent hospitalization for Afib w RVR in July.    Last seen by Heather 5 days ago. Last seen by me in July.    C/o cough x 3 days. Dry cough. Sinus pressure. PND. Low grade temp when checked at home this morning.     She is concerned that the amiodarone (and possibly cardizem) is causing her SOB, breathlessness, dry mouth.     Hosp/ED/Urgent Care  7/11 - 7/14 Hosp Afib w RVR    Recent appointments:   9/06 Nephrology Dr. Travis dominguez f/u 1 year  9/10 PCP [facility] Stacey Perrault  8/17 Cardiology Dr. Kelly    Upcoming appointments:  Future Appointments       Date Provider Specialty Appt Notes    9/21/2022 Toi Kelly MD " Cardiology ok per Robin    9/29/2022 Trevon Ramirez MD Cardiology 6 mo    10/25/2022 Wilbert Ware MD Ophthalmology 3-4 months check with [facility]    12/7/2022  Lab labs-[facility]    12/14/2022 Taco Jiménez MD Rheumatology ep/rtc/6months/labs/ca           The following were reviewed: Active problem list, medication list, allergies, family history, social history, and Health Maintenance.     History:  Past Medical History:   Diagnosis Date    Anemia 11/29/2018    Anxiety 11/28/2017    Arthritis     Atrial fibrillation with RVR 10/9/2019    Back pain     Basal cell carcinoma 03/29/2018    left mid back    Chronic diastolic congestive heart failure 10/15/2019    CKD (chronic kidney disease) stage 3, GFR 30-59 ml/min 8/8/2016    Colon polyps     reported per patient.     Coronary artery calcification 10/5/2021    Fuchs' corneal dystrophy     General anesthetics causing adverse effect in therapeutic use     woke up during surgery and gagged on ET tube    Glaucoma     Glaucoma     Heart failure with preserved left ventricular function (HFpEF) 7/24/2018    Hyperlipidemia     Hypertension     Macular degeneration dry    Obesity     PVD (peripheral vascular disease) 4/26/2021    PVD (peripheral vascular disease) 4/26/2021    Squamous cell carcinoma 01/08/2019    left shoulder    Stenosis of carotid artery 10/5/2021    Trouble in sleeping     Type 2 diabetes mellitus without complication, without long-term current use of insulin 2/24/2021    Urinary tract infection      Patient Active Problem List   Diagnosis    HTN (hypertension)    Mixed hyperlipidemia    Primary osteoarthritis involving multiple joints    Macular degeneration - Both Eyes    Fuch's endothelial dystrophy - Both Eyes    Lens replaced by other means    COAG (chronic open-angle glaucoma) - Both Eyes    Blepharitis, unspecified - Both Eyes    Shortness of breath    Abnormal ECG    PVC's (premature ventricular contractions)    Other microscopic hematuria     LVH (left ventricular hypertrophy) due to hypertensive disease    Osteopenia of hip    Myalgia    Calcification of aorta    Neuropathy    Unequal blood pressures in paired extremities    Medication side effects    History of cornea transplant    Pseudophakia    Insomnia    Anxiety    Pre-diabetes    Obesity (BMI 30.0-34.9)    Elevated troponin    Bilateral shoulder region arthritis    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    Anemia    Thrombus of left atrial appendage    PAF (paroxysmal atrial fibrillation)    Elevated liver enzymes    Atrial flutter with rapid ventricular response    Chronic anticoagulation    Keratitis sicca, bilateral    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    PAD (peripheral artery disease)    Statin intolerance    Pulmonary nodules/lesions, multiple    Spinal stenosis at L4-L5 level    Pulmonary granuloma    Lumbar spondylosis    Bilateral recurrent inguinal hernia without obstruction or gangrene    LLQ abdominal pain    Pain in left leg    Rectal abnormality    Stenosis of carotid artery    Carotid artery calcification, bilateral    B12 deficiency    Seasonal allergic rhinitis    Atrial fibrillation with RVR    Elevated serum creatinine    Coagulopathy     Review of patient's allergies indicates:   Allergen Reactions    Carvedilol Swelling     lips  lips  Other reaction(s): swelling    Cephalexin Other (See Comments)     Muscle/joint weakness unable to move     Doxycycline calcium Other (See Comments)     Weakness nausea   sob  Other reaction(s): weakness, nausea, SOB    Erythromycin Other (See Comments)     Complete weakness/could not walk    Gadolinium-containing contrast media Other (See Comments)     angioedema  Other reaction(s): Angioedema    Hydrocodone-acetaminophen      wheezing  wheezing  wheezing  Other reaction(s): wheezing    Inveltys [loteprednol etabonate] Palpitations and Other (See Comments)     Patient stated bp went up.     Labetalol Shortness Of Breath, Nausea Only and Other (See Comments)     Palpitations, LE weakness  Other reaction(s): SOB, palpitations, weakness    Loratadine Other (See Comments)     Double vision/spots  Other reaction(s): double vision    Macrolide antibiotics Other (See Comments)     Complete weakness/could not walk  Complete weakness/could not walk  Complete weakness/could not walk  Complete weakness/could not walk  Other reaction(s): complete weakness    Moxifloxacin Other (See Comments)     Hives   muscle soreness  Other reaction(s): hives, muscle soreness    Naproxen      wheezing  wheezing  wheezing  Other reaction(s): wheezing    Statins-hmg-coa reductase inhibitors Other (See Comments)     Severe Muscle weakness  Muscle weakness  Severe Muscle weakness  Other reaction(s): severe muscle weakness    Timolol Other (See Comments)     Severe muscle weakness, eye problems.  Other reaction(s): severe muscle weakness    Verapamil Diarrhea     Severe diarrhea.  Other reaction(s): diarrhea    Amlodipine      Myalgias    Other reaction(s): myalgian    Doxycycline     Doxycycline hyclate      Other reaction(s): weakness, nausea, SOB    Fenofibrate      Causes muscle weakness  Other reaction(s): muscle weakness    Hydralazine      Other reaction(s): muscle tremors    Hydralazine analogues      Muscle tremors/aches      Hydrocortisone     Isosorbide      Tolerates Imdur    Loteprednol      Other reaction(s): increased BP    Tetanus and diphtheria toxoids     Adhesive Rash     Clonidine patch - 2 mfg - contact dermatitis    Codeine Diarrhea and Other (See Comments)     Loss of balance   Other reaction(s): loss of balance    Doxazosin Palpitations     Other reaction(s): palpitations    Fexofenadine Other (See Comments)     Double vision/spots   Other reaction(s): double vision    Hydrocortisone (bulk) Other (See Comments)     Only suppository/ caused muscle weakness    Latanoprost Other (See Comments)     Muscle  weakness  Other reaction(s): muscle weakness    Olopatadine Other (See Comments)     Muscle weakness  Other reaction(s): muscle weakness    Prednisone Anxiety       Medications:  Current Outpatient Medications on File Prior to Visit   Medication Sig Dispense Refill    acetaminophen (TYLENOL) 500 MG tablet Take 500 mg by mouth daily as needed. Taking 1 to 3      ALPRAZolam (XANAX) 0.25 MG tablet Take 1 tablet (0.25 mg total) by mouth nightly as needed for Insomnia or Anxiety. 30 tablet 2    amiodarone (PACERONE) 200 MG Tab Take 1 tablet (200 mg total) by mouth every evening. After 4 weeks continue amiodarone 200mg daily 30 tablet 11    apixaban (ELIQUIS) 2.5 mg Tab Take 1 tablet (2.5 mg total) by mouth 2 (two) times daily. 180 tablet 3    b complex vitamins capsule Take 1 capsule by mouth once daily.      cholecalciferol, vitamin D3, (VITAMIN D3) 50 mcg (2,000 unit) Cap Take 1 capsule by mouth once daily.      coenzyme Q10 200 mg capsule Take 400 mg by mouth once daily.       diltiaZEM (CARDIZEM) 60 MG tablet Take 1 tablet (60 mg total) by mouth every 8 (eight) hours. 270 tablet 1    fish oil-omega-3 fatty acids 300-1,000 mg capsule Take 2 g by mouth 2 (two) times daily.       furosemide (LASIX) 40 MG tablet Take 1 tablet (40 mg total) by mouth daily as needed (edema, swelling, fluid). 60 tablet 3    hydroCHLOROthiazide (HYDRODIURIL) 25 MG tablet Take 1 tablet (25 mg total) by mouth once daily. 90 tablet 1    losartan (COZAAR) 25 MG tablet Take 1 tablet (25 mg total) by mouth once daily. 180 tablet 1    metoprolol tartrate (LOPRESSOR) 25 MG tablet Take 1 tablet (25 mg total) by mouth 2 (two) times daily. 180 tablet 1    mupirocin (BACTROBAN) 2 % ointment apply to affected area twice a day with Q-tip. Antibiotic ointment. 30 g 1    POLYETHYLENE GLYCOL 3350 (MIRALAX ORAL) Take 17 g by mouth 2 (two) times a day. Taking BID      RHOPRESSA 0.02 % ophthalmic solution PLACE 1 DROP INTO THE LEFT EYE EVERY EVENING. 2.5 mL 11     travoprost (TRAVATAN Z) 0.004 % ophthalmic solution PLACE 1 DROP INTO THE LEFT EYE EVERY EVENING. 2.5 mL 10    [DISCONTINUED] cloNIDine (CATAPRES) 0.1 MG tablet Take 1 tablet (0.1 mg total) by mouth 2 (two) times daily as needed (systolic BP over 180). 20 tablet 1    [DISCONTINUED] isosorbide mononitrate (IMDUR) 30 MG 24 hr tablet Take 1 tablet (30 mg total) by mouth every evening. 90 tablet 1     Current Facility-Administered Medications on File Prior to Visit   Medication Dose Route Frequency Provider Last Rate Last Admin    sodium chloride 0.9% flush 3 mL  3 mL Intravenous PRN Steve Galarza PA-C           Medications have been reviewed and reconciled with patient at visit today.    Barriers to medications present (yes )  C/o trouble swallowing medication due to dry mouth    Adverse reactions to current medications (possibly)    Over the counter medications reviewed (Yes) and if needed added to active Medication list.   Taking Arinica tablets for aches and pains  Taking Allium Cepa for runny nose, watery eyes  Using Olbas oil for R sided sinus issues  Takes echinechia but not as much since started amiodarone and cardizem.    Review of Systems   Constitutional:  Positive for activity change and fatigue.   HENT:  Positive for mouth dryness, postnasal drip, sinus pressure/congestion, sore throat and trouble swallowing.    Respiratory:  Positive for cough and shortness of breath. Negative for wheezing.    Cardiovascular:  Positive for leg swelling. Negative for chest pain and palpitations.   Neurological:  Positive for weakness.      Exam:  Vitals:    09/15/22 1153   BP: 118/68   Pulse: 73   Resp: 20   Temp: 99.1 °F (37.3 °C)     Weight: 85 kg (187 lb 6.3 oz)   Body mass index is 32.17 kg/m².    Physical Exam  Constitutional:       General: She is not in acute distress.     Appearance: Normal appearance. She is obese.   HENT:      Head: Normocephalic and atraumatic.      Right Ear: Tympanic membrane, ear  "canal and external ear normal.      Left Ear: Tympanic membrane, ear canal and external ear normal.      Nose: Nose normal. No congestion.      Comments: Sinus tenderness on palpation, jonny R maxilla - edema but no erythema or warmth.     Mouth/Throat:      Mouth: Mucous membranes are moist.      Pharynx: No oropharyngeal exudate.      Comments: Mild erythema lateral post oropharynx  Eyes:      General:         Right eye: No discharge.         Left eye: No discharge.      Extraocular Movements: Extraocular movements intact.      Conjunctiva/sclera: Conjunctivae normal.   Cardiovascular:      Rate and Rhythm: Rhythm irregular.   Pulmonary:      Effort: Pulmonary effort is normal.      Breath sounds: Rhonchi present. No wheezing.      Comments: "Crackles" and diminished air movement on left  Musculoskeletal:      Right lower leg: Edema present.      Left lower leg: Edema present.      Comments: 1+ B ankle edema   Skin:     General: Skin is warm and dry.   Neurological:      General: No focal deficit present.      Mental Status: She is alert and oriented to person, place, and time. Mental status is at baseline.   Psychiatric:         Mood and Affect: Mood normal.         Behavior: Behavior normal.         Thought Content: Thought content normal.      Laboratory Reviewed: (Yes)  Lab Results   Component Value Date    WBC 8.41 08/31/2022    HGB 11.6 (L) 08/31/2022    HCT 37.3 08/31/2022     08/31/2022     (H) 08/31/2022    CHOL 168 08/31/2022    TRIG 98 08/31/2022    HDL 43 08/31/2022    LDLCALC 105.4 08/31/2022    ALT 13 08/31/2022    AST 18 08/31/2022     08/31/2022    K 4.1 08/31/2022     08/31/2022    CREATININE 1.5 (H) 08/31/2022    BUN 26 (H) 08/31/2022    CO2 23 08/31/2022    MG 2.0 07/26/2022    TSH 3.826 08/31/2022    INR 1.0 02/12/2020    HGBA1C 6.2 (H) 08/31/2022    CRP 4.0 04/26/2022     9/15: CoVid test negative    CXR 9/15: The trachea and cardiomediastinal silhouette remains stable. " There is interval increase in perihilar and bibasilar opacities suggestion of congestive heart failure/pulmonary vascular congestion. Small bilateral pleural effusions cannot be entirely excluded. No evidence for pneumothorax. Osseous structures demonstrate no evidence for acute fractures or dislocations. Impression: Findings which could suggest Congestive Heart Failure  -----------------------------------------------------------------------------------------------------  Other older lab/cardiac/imaging results:    9/02 mma 0.36 wnl B12 557 wnl  (H)  8/31 eGFR 33.1  1/24 VIRY +    ECG 9/02: QTc Int : 521 ms Atrial fibrillation Nonspecific T wave abnormality     Cardiac monitor 5 days 16 hrs 7/26 - 8/03: Heart rates varied between 41 and 123 BPM with an average of 77 BPM. Predominant rhythm: atrial fibrillation Atrial Fibrillation (AF) 100.0 % PVC 0.48 %    Echo 7/12: Concentric remodeling and normal systolic function. The estimated ejection fraction is 60%. Normal left ventricular diastolic function. Normal right ventricular size with normal right ventricular systolic function. PAP > 30 mmHg (Pulm HTN)    CXR 7/13: There is a large opacity in the lateral left lung base.  Mild vascular congestion versus reticular interstitial changes throughout the lungs. The cardiac silhouette size is enlarged.Pulmonary vasculature is mildly congested. Tortuous aorta with calcifications of the aortic knob. There are degenerative changes of the spine and both shoulder girdles. Stable postoperative changes to the left shoulder girdle. Stable apical pleuroparenchymal changes.  Impression: Large opacity within the lateral left lung base. Infectious and neoplastic processes must be considered.       Assessment:   89 y.o. female with multiple co-morbid illnesses here for continued follow up of medical problems.      The primary encounter diagnosis was Shortness of breath. A diagnosis of PAF (paroxysmal atrial fibrillation) was also  pertinent to this visit.      Plan:     Problem List Items Addressed This Visit          Pulmonary    Shortness of breath - Primary     CXR findings consistent w CHF - low grade temp and URI symptoms noted also however - rapid CoVid test negative - recommend increase diuresis over coming days but consider repeat home CoVid test in a couple of days - if symptoms worsen or O2 sats 88% or less advised to go to ED/call 911.         Relevant Orders    X-Ray Chest PA And Lateral (Completed)    POCT COVID-19 Rapid Screening (Completed)       Cardiac/Vascular    PAF (paroxysmal atrial fibrillation) (Chronic)     With symptoms and CXR findings suggestive of CHF at present - suspect secondary to cardiac arrhythmia (recent echocardiogram 7/12 actually w normal systolic and diastolic function though PAP > 30) recommend increase diuretic over next few days - if no improvement or if O2 sats 88 or less advised to go to ED (call 911) - inbox message sent to cardiology as well            Health Maintenance         Date Due Completion Date    Shingles Vaccine (1 of 2) Never done ---    TETANUS VACCINE 11/09/2021 11/9/2011    COVID-19 Vaccine (4 - Booster for Pfizer series) 12/22/2021 9/29/2021    Influenza Vaccine (1) 09/01/2022 3/24/2022    Pneumococcal Vaccines (Age 65+) (2 - PCV) 03/16/2023 3/16/2022    Lipid Panel 08/31/2023 8/31/2022            -Patient's lab and Xray results were reviewed and discussed with patient  -Treatment options and alternatives were discussed with the patient. Patient expressed understanding. Patient was given the opportunity to ask questions and be an active participant in their medical care. Patient had no further questions or concerns at this time.   -Patient is an overall HIGH risk for health complications from their medical conditions.     Follow up: Follow up in about 4 weeks (around 10/13/2022) for Follow Up.    After visit summary printed and given to patient upon discharge.  Patient care plan  included in After visit summary.    TOTAL TIME evaluating and managing this patient for this encounter was greater than 60 minutes. This time was spent personally by me on the following activities: review of patient's past medical history, assessing age-appropriate health maintenance needs, review of any interval history, review and interpretation of lab results, review and interpretation of imaging test results, review and interpretation of cardiology test results, reviewing consulting specialist notes, obtaining history from the patient and family, examination of the patient, medication reconciliation, managing and/or ordering prescription medications, ordering imaging tests, ordering referral to subspecialty provider(s), educating patient and answering their questions about diagnosis, treatment plan, and goals of treatment, discussing planned follow-up and final documentation of the visit. This time was exclusive of any separately billable procedures for this patient and exclusive of time spent treating any other patients.

## 2022-09-15 NOTE — TELEPHONE ENCOUNTER
Returned pt call concerning her needing a same day appointment for cough, sob on exertion and elevated pulse. Appointment scheduled to  come in today to be evaluated by one of our providers.

## 2022-09-15 NOTE — TELEPHONE ENCOUNTER
----- Message from Phylicia Deleon MD sent at 9/15/2022  8:45 AM CDT -----  Regarding: FW: same day appt  Contact: pt  Hi - I'm going to be here half day     ----- Message -----  From: Marta Medina  Sent: 9/15/2022   7:33 AM CDT  To: [facility] Staff  Subject: same day appt                                    Type:  Same Day Appointment Request    Caller is requesting a same day appointment.  Caller declined first available appointment listed below.    Name of Caller: Shirley Metz   When is the first available appointment? Na   Symptoms: cough, body weakness, shortness of breath with exertion, pulse elevated  Best Call Back Number: [phone]  Additional Information:

## 2022-09-15 NOTE — PATIENT INSTRUCTIONS
Recommend taking furosemide 40 mg when get home today and 20 mg daily for next few days - please contact me or your cardiologist if no improvement.    If worsening dyspnea/SOB or pulseOx 88 or less please go to ED for further evaluation and treatment.

## 2022-09-15 NOTE — TELEPHONE ENCOUNTER
----- Message from Marta Medina sent at 9/15/2022  7:29 AM CDT -----  Regarding: same day appt  Contact: pt  Type:  Same Day Appointment Request    Caller is requesting a same day appointment.  Caller declined first available appointment listed below.    Name of Caller: Shirley Metz   When is the first available appointment? Na   Symptoms: cough, body weakness, shortness of breath with exertion, pulse elevated  Best Call Back Number: [phone]  Additional Information:

## 2022-09-15 NOTE — TELEPHONE ENCOUNTER
----- Message from Chacho Healy sent at 9/15/2022  9:11 AM CDT -----  Contact: Patient  Type:  Same Day Appointment Request    Caller is requesting a same day appointment.  Caller declined first available appointment listed below.    Name of Caller:Shirley Bettencourt Isaías   When is the first available appointment? N/a  Symptoms:  A fib, shortness of breath, pulse is high   Best Call Back Number:[phone]   Additional Information:

## 2022-09-16 ENCOUNTER — TELEPHONE (OUTPATIENT)
Dept: PRIMARY CARE CLINIC | Facility: CLINIC | Age: 87
End: 2022-09-16
Payer: MEDICARE

## 2022-09-16 NOTE — TELEPHONE ENCOUNTER
Spoke w Ms. Bettencourt - reports still w non-porductive cough but breathlessness improving. Reports Pulse ox has been good - says heart rate went up a couple of times with activity but returned to normal quickly.Advised to cont taking furosemide qam over next few days (20mg or even 40mg if BP ok). Ms. Bettencourt expressed her understanding and appreciation for the call.

## 2022-09-21 ENCOUNTER — HOSPITAL ENCOUNTER (EMERGENCY)
Facility: HOSPITAL | Age: 87
Discharge: HOME | End: 2022-09-21
Attending: EMERGENCY MEDICINE
Payer: COMMERCIAL

## 2022-09-21 ENCOUNTER — OFFICE VISIT (OUTPATIENT)
Dept: CARDIOLOGY | Facility: CLINIC | Age: 87
End: 2022-09-21
Payer: COMMERCIAL

## 2022-09-21 VITALS
HEART RATE: 87 BPM | RESPIRATION RATE: 15 BRPM | WEIGHT: 186.06 LBS | DIASTOLIC BLOOD PRESSURE: 80 MMHG | SYSTOLIC BLOOD PRESSURE: 138 MMHG | OXYGEN SATURATION: 100 % | BODY MASS INDEX: 31.77 KG/M2 | HEIGHT: 64 IN | TEMPERATURE: 98 F

## 2022-09-21 VITALS
DIASTOLIC BLOOD PRESSURE: 62 MMHG | OXYGEN SATURATION: 94 % | WEIGHT: 186.06 LBS | HEART RATE: 78 BPM | SYSTOLIC BLOOD PRESSURE: 120 MMHG | HEIGHT: 64 IN | BODY MASS INDEX: 31.77 KG/M2

## 2022-09-21 DIAGNOSIS — I48.91 ATRIAL FIBRILLATION WITH RVR: Primary | ICD-10-CM

## 2022-09-21 DIAGNOSIS — M79.602 LEFT ARM PAIN: ICD-10-CM
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
## 2022-09-21 DIAGNOSIS — I48.92 ATRIAL FLUTTER WITH RAPID VENTRICULAR RESPONSE: ICD-10-CM

## 2022-09-21 PROCEDURE — 99283 EMERGENCY DEPT VISIT LOW MDM: CPT | Mod: HCNC

## 2022-09-21 PROCEDURE — 25000003 PHARM REV CODE 250: Mod: HCNC | Performed by: REGISTERED NURSE

## 2022-09-21 PROCEDURE — 3288F PR FALLS RISK ASSESSMENT DOCUMENTED: ICD-10-PCS | Mod: HCNC,CPTII,S$GLB, | Performed by: INTERNAL MEDICINE

## 2022-09-21 PROCEDURE — 1159F MED LIST DOCD IN RCRD: CPT | Mod: HCNC,CPTII,S$GLB, | Performed by: INTERNAL MEDICINE

## 2022-09-21 PROCEDURE — 1126F AMNT PAIN NOTED NONE PRSNT: CPT | Mod: HCNC,CPTII,S$GLB, | Performed by: INTERNAL MEDICINE

## 2022-09-21 PROCEDURE — 1101F PT FALLS ASSESS-DOCD LE1/YR: CPT | Mod: HCNC,CPTII,S$GLB, | Performed by: INTERNAL MEDICINE

## 2022-09-21 PROCEDURE — 3288F FALL RISK ASSESSMENT DOCD: CPT | Mod: HCNC,CPTII,S$GLB, | Performed by: INTERNAL MEDICINE

## 2022-09-21 PROCEDURE — 1159F PR MEDICATION LIST DOCUMENTED IN MEDICAL RECORD: ICD-10-PCS | Mod: HCNC,CPTII,S$GLB, | Performed by: INTERNAL MEDICINE

## 2022-09-21 PROCEDURE — 99999 PR PBB SHADOW E&M-EST. PATIENT-LVL V: CPT | Mod: PBBFAC,HCNC,, | Performed by: INTERNAL MEDICINE

## 2022-09-21 PROCEDURE — 1126F PR PAIN SEVERITY QUANTIFIED, NO PAIN PRESENT: ICD-10-PCS | Mod: HCNC,CPTII,S$GLB, | Performed by: INTERNAL MEDICINE

## 2022-09-21 PROCEDURE — 99215 OFFICE O/P EST HI 40 MIN: CPT | Mod: HCNC,S$GLB,, | Performed by: INTERNAL MEDICINE

## 2022-09-21 PROCEDURE — 1101F PR PT FALLS ASSESS DOC 0-1 FALLS W/OUT INJ PAST YR: ICD-10-PCS | Mod: HCNC,CPTII,S$GLB, | Performed by: INTERNAL MEDICINE

## 2022-09-21 PROCEDURE — 99215 PR OFFICE/OUTPT VISIT, EST, LEVL V, 40-54 MIN: ICD-10-PCS | Mod: HCNC,S$GLB,, | Performed by: INTERNAL MEDICINE

## 2022-09-21 PROCEDURE — 99999 PR PBB SHADOW E&M-EST. PATIENT-LVL V: ICD-10-PCS | Mod: PBBFAC,HCNC,, | Performed by: INTERNAL MEDICINE

## 2022-09-21 RX ORDER — ACETAMINOPHEN 325 MG/1
650 TABLET ORAL
Status: COMPLETED | OUTPATIENT
Start: 2022-09-21 | End: 2022-09-21

## 2022-09-21 RX ADMIN — ACETAMINOPHEN 650 MG: 325 TABLET ORAL at 11:09

## 2022-09-21 NOTE — Clinical Note
See combo CTI/PVI case. She wishes to have an afternoon case. If Oct 6th does not open up, she would prefer early November (family out of town last 2 weeks in Oct). thanks
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
## 2022-09-21 NOTE — PROGRESS NOTES
Subjective:    Patient ID:  Shirley Metz is a 89 y.o. female who presents for follow-up of Atrial Fibrillation      89 yoF referred for AF management.     8/17/22: NSR on recent ECGs up until 6/15/22. AFL and AF on ECGs afterwards starting 7/11/22. Event monitor with 100% AF, controlled average V rate. Normal EF. She had AF after a new BP agent 10/19. Her symptoms are mild. She has tried amiodarone in the past which caused side effects. She has history of CAD and has QTc 450s.     Interval history: AF 9/2/22 ECG. She continues to have symptomatic AF as well as symptoms on medication. She wishes to pursue ablation.     Echo 7/12/22:  · Concentric remodeling and normal systolic function.  · The estimated ejection fraction is 60%.  · Normal left ventricular diastolic function.  · Normal right ventricular size with normal right ventricular systolic function.    Event monitor 7/22:  - Heart rates varied between 41 and 123 BPM with an average of 77 BPM.  - Predominant rhythm: atrial fibrillation   - Atrial Fibrillation (AF) 100.0 %  - PVC 0.48 %    Past Medical History:  11/29/2018: Anemia  11/28/2017: Anxiety  No date: Arthritis  10/9/2019: Atrial fibrillation with RVR  No date: Back pain  03/29/2018: Basal cell carcinoma      Comment:  left mid back  10/15/2019: Chronic diastolic congestive heart failure  8/8/2016: CKD (chronic kidney disease) stage 3, GFR 30-59 ml/min  No date: Colon polyps      Comment:  reported per patient.   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  No date: General anesthetics causing adverse effect in therapeutic use      Comment:  woke up during surgery and gagged on ET tube  No date: Glaucoma  No date: Glaucoma  7/24/2018: Heart failure with preserved left ventricular function   (HFpEF)  No date: Hyperlipidemia  No date: Hypertension  dry: Macular degeneration  No date: Obesity  4/26/2021: PVD (peripheral vascular disease)  4/26/2021: PVD  (peripheral vascular disease)  01/08/2019: Squamous cell carcinoma      Comment:  left shoulder  10/5/2021: Stenosis of carotid artery  No date: Trouble in sleeping  2/24/2021: Type 2 diabetes mellitus without complication, without   long-term current use of insulin  No date: Urinary tract infection    Past Surgical History:  1938: ADENOIDECTOMY  No date: BREAST BIOPSY; Left      Comment:  1997. benign  1997 approx: BREAST CYST EXCISION; Left  2012 approx: CARPAL TUNNEL RELEASE; Right  1994: CATARACT EXTRACTION; Bilateral  1975: CHOLECYSTECTOMY  08/2015: CORNEAL TRANSPLANT; Bilateral      Comment:  8/2015 - left; Right 4/2016  No date: EYE SURGERY      Comment:  Fuch's Corneal dystrophy - had 2nd operation with Dr. Quintanilla  No date: EYE SURGERY      Comment:  Cataract wIOL  2011: left thumb; Left      Comment:  removed cuboid for arthritis  8/21/2018: REVERSE TOTAL SHOULDER ARTHROPLASTY; Left      Comment:  Procedure: ARTHROPLASTY, SHOULDER, TOTAL, REVERSE;                 Surgeon: Solomon Lujan MD;  Location: [facility] OR;                 Service: Orthopedics;  Laterality: Left;  WITH BICEP                TENODESIS  2017: SKIN CANCER EXCISION; Left      Comment:  BCC removal by Dr. Valadez  05/11/2011: SLT- OS (aka TRABECULOPLASTY); Left      Comment:  repeat 3/14/12  8/21/2018: TENOTOMY; Left      Comment:  Procedure: TENOTOMY;  Surgeon: Solomon Lujan MD;                 Location: [facility] OR;  Service: Orthopedics;  Laterality:                Left;  1938: TONSILLECTOMY  10/10/2019: TREATMENT OF CARDIAC ARRHYTHMIA; N/A      Comment:  Procedure: CARDIOVERSION;  Surgeon: Pete Durán MD;                Location: [facility] CATH LAB;  Service: Cardiology;                 Laterality: N/A;  12/6/2019: TREATMENT OF CARDIAC ARRHYTHMIA; N/A      Comment:  Procedure: CARDIOVERSION;  Surgeon: Samy Castillo MD;                 Location: [facility] CATH LAB;  Service: Cardiology;                 Laterality: N/A;    Social  History    Socioeconomic History      Marital status:       Number of children: 3    Tobacco Use      Smoking status: Never      Smokeless tobacco: Never      Tobacco comments: history of passive smoking from her ex-    Substance and Sexual Activity      Alcohol use: Yes        Alcohol/week: 2.0 standard drinks        Types: 2 Standard drinks or equivalent per week        Comment: occ      Drug use: No      Sexual activity: Not Currently        Partners: Male        Birth control/protection: Post-menopausal        Comment: discussed protection for STD's    Other Topics      Concerns:        Are you pregnant or think you may be?: No        Breast-feeding: No    Social History Narrative       x 2,  x 1. Retired artist - previously doing jewelry/wall pieces; ; lives in [facility]; has 3 sons - 52( lives in [place], 54, and 56;exercises minimally - limited due to back issues. Still drives. Does have a Living Will.    Social Determinants of Health  Financial Resource Strain: Low Risk       Difficulty of Paying Living Expenses: Not hard at all  Food Insecurity: No Food Insecurity      Worried About Running Out of Food in the Last Year: Never true      Ran Out of Food in the Last Year: Never true  Transportation Needs: No Transportation Needs      Lack of Transportation (Medical): No      Lack of Transportation (Non-Medical): No  Physical Activity: Insufficiently Active      Days of Exercise per Week: 3 days      Minutes of Exercise per Session: 10 min  Stress: No Stress Concern Present      Feeling of Stress : Not at all  Social Connections: Moderately Isolated      Frequency of Communication with Friends and Family: Three times a week      Frequency of Social Gatherings with Friends and Family: Three times a week      Attends Yarsanism Services: Never      Active Member of Clubs or Organizations: Yes      Attends Club or Organization Meetings: More than 4 times per  year      Marital Status:   Housing Stability: Low Risk       Unable to Pay for Housing in the Last Year: No      Number of Places Lived in the Last Year: 1      Unstable Housing in the Last Year: No    Review of patient's family history indicates:      Current Outpatient Medications:  acetaminophen (TYLENOL) 500 MG tablet, Take 500 mg by mouth daily as needed. Taking 1 to 3, Disp: , Rfl:   ALPRAZolam (XANAX) 0.25 MG tablet, Take 1 tablet (0.25 mg total) by mouth nightly as needed for Insomnia or Anxiety., Disp: 30 tablet, Rfl: 2  amiodarone (PACERONE) 200 MG Tab, Take 1 tablet (200 mg total) by mouth every evening. After 4 weeks continue amiodarone 200mg daily, Disp: 30 tablet, Rfl: 11  apixaban (ELIQUIS) 2.5 mg Tab, Take 1 tablet (2.5 mg total) by mouth 2 (two) times daily., Disp: 180 tablet, Rfl: 3  b complex vitamins capsule, Take 1 capsule by mouth once daily., Disp: , Rfl:   cholecalciferol, vitamin D3, (VITAMIN D3) 50 mcg (2,000 unit) Cap, Take 1 capsule by mouth once daily., Disp: , Rfl:   coenzyme Q10 200 mg capsule, Take 400 mg by mouth once daily. , Disp: , Rfl:   diltiaZEM (CARDIZEM) 60 MG tablet, Take 1 tablet (60 mg total) by mouth every 8 (eight) hours., Disp: 270 tablet, Rfl: 1  fish oil-omega-3 fatty acids 300-1,000 mg capsule, Take 2 g by mouth 2 (two) times daily. , Disp: , Rfl:   furosemide (LASIX) 40 MG tablet, Take 1 tablet (40 mg total) by mouth daily as needed (edema, swelling, fluid)., Disp: 60 tablet, Rfl: 3  hydroCHLOROthiazide (HYDRODIURIL) 25 MG tablet, Take 1 tablet (25 mg total) by mouth once daily., Disp: 90 tablet, Rfl: 1  losartan (COZAAR) 25 MG tablet, Take 1 tablet (25 mg total) by mouth once daily., Disp: 180 tablet, Rfl: 1  metoprolol tartrate (LOPRESSOR) 25 MG tablet, Take 1 tablet (25 mg total) by mouth 2 (two) times daily., Disp: 180 tablet, Rfl: 1  mupirocin (BACTROBAN) 2 % ointment, apply to affected area twice a day with Q-tip. Antibiotic ointment., Disp: 30 g, Rfl:  1  POLYETHYLENE GLYCOL 3350 (MIRALAX ORAL), Take 17 g by mouth 2 (two) times a day. Taking BID, Disp: , Rfl:   RHOPRESSA 0.02 % ophthalmic solution, PLACE 1 DROP INTO THE LEFT EYE EVERY EVENING., Disp: 2.5 mL, Rfl: 11  travoprost (TRAVATAN Z) 0.004 % ophthalmic solution, PLACE 1 DROP INTO THE LEFT EYE EVERY EVENING., Disp: 2.5 mL, Rfl: 10    No current facility-administered medications for this visit.  Facility-Administered Medications Ordered in Other Visits:  sodium chloride 0.9% flush 3 mL, 3 mL, Intravenous, PRN, Steve Galarza PA-C              Review of Systems   Constitutional: Negative. Negative for diaphoresis, malaise/fatigue and weight gain.   HENT: Negative.  Negative for hoarse voice.    Eyes: Negative.  Negative for double vision and visual disturbance.   Cardiovascular:  Negative for chest pain, claudication, cyanosis, dyspnea on exertion, irregular heartbeat, leg swelling, near-syncope, orthopnea, palpitations, paroxysmal nocturnal dyspnea and syncope.   Respiratory: Negative.  Negative for cough, shortness of breath and snoring.    Endocrine: Negative.    Hematologic/Lymphatic: Negative.  Negative for bleeding problem. Does not bruise/bleed easily.   Skin: Negative.  Negative for color change and poor wound healing.   Musculoskeletal: Negative.  Negative for muscle cramps, muscle weakness and myalgias.   Gastrointestinal: Negative.  Negative for bloating, abdominal pain, change in bowel habit, diarrhea, heartburn, hematemesis, hematochezia, melena and nausea.   Genitourinary: Negative.    Neurological: Negative.  Negative for excessive daytime sleepiness, dizziness, headaches, light-headedness, loss of balance, numbness and weakness.        HAS HOTTNESS IN BOTH FEET AND ARMS AND HANDS.    Psychiatric/Behavioral: Negative.  Negative for memory loss. The patient does not have insomnia.    Allergic/Immunologic: Negative.  Negative for hives.      Objective:    Physical Exam  Vitals and nursing  note reviewed.   Constitutional:       General: She is not in acute distress.     Appearance: She is well-developed. She is not diaphoretic.   HENT:      Head: Normocephalic and atraumatic.      Nose: Nose normal.   Eyes:      General: No scleral icterus.     Conjunctiva/sclera: Conjunctivae normal.   Neck:      Thyroid: No thyromegaly.      Vascular: No JVD.   Cardiovascular:      Rate and Rhythm: Normal rate. Rhythm irregular.      Heart sounds: S1 normal and S2 normal. No murmur heard.    No friction rub. No gallop. No S3 or S4 sounds.   Pulmonary:      Effort: Pulmonary effort is normal. No respiratory distress.      Breath sounds: Normal breath sounds. No stridor. No wheezing or rales.   Chest:      Chest wall: No tenderness.   Abdominal:      General: Bowel sounds are normal. There is no distension.      Palpations: Abdomen is soft. There is no mass.      Tenderness: There is no abdominal tenderness. There is no rebound.   Genitourinary:     Comments: Deferred  Musculoskeletal:         General: No tenderness or deformity. Normal range of motion.      Cervical back: Normal range of motion and neck supple.   Lymphadenopathy:      Cervical: No cervical adenopathy.   Skin:     General: Skin is warm and dry.      Coloration: Skin is not pale.      Findings: No erythema or rash.   Neurological:      Mental Status: She is alert and oriented to person, place, and time.      Motor: No abnormal muscle tone.      Coordination: Coordination normal.   Psychiatric:         Behavior: Behavior normal.         Thought Content: Thought content normal.         Judgment: Judgment normal.         Assessment:       1. Atrial fibrillation with RVR    2. Atrial flutter with rapid ventricular response         Plan:       89 yoF here for arrhythmia management. She continues to have symptomatic AF/AFL as well as symptoms from increased rate control agents and AAD therapy. She wishes to pursue ablation. I had extensive discussion with  patient regarding risks and benefits of PVI/WACA, and the patient would like to proceed. Risks of procedure include (but are not limited to) bleeding, stroke, perforation requiring emergency draining or surgery, AV block, death, AV fistula, AE fistula, PV stenosis.    Last anti-coagulation dose night prior to procedure.  Discontinue antiarrhytmic drugs 4 days prior to the procedure.     RF PVI/CTI  Anesthesia  STACEY prior, cancel if NSR  DEDE

## 2022-09-23 ENCOUNTER — TELEPHONE (OUTPATIENT)
Dept: PRIMARY CARE CLINIC | Facility: CLINIC | Age: 87
End: 2022-09-23
Payer: MEDICARE
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
## 2022-09-27 ENCOUNTER — OFFICE VISIT (OUTPATIENT)
Dept: PRIMARY CARE CLINIC | Facility: CLINIC | Age: 87
End: 2022-09-27
Payer: COMMERCIAL

## 2022-09-27 VITALS
HEART RATE: 85 BPM | DIASTOLIC BLOOD PRESSURE: 80 MMHG | SYSTOLIC BLOOD PRESSURE: 150 MMHG | BODY MASS INDEX: 31.65 KG/M2 | OXYGEN SATURATION: 96 % | WEIGHT: 184.38 LBS

## 2022-09-27 DIAGNOSIS — F41.9 ANXIETY: ICD-10-CM

## 2022-09-27 DIAGNOSIS — I48.91 ATRIAL FIBRILLATION WITH RVR: ICD-10-CM

## 2022-09-27 DIAGNOSIS — R07.89 LEFT-SIDED CHEST WALL PAIN: Primary | ICD-10-CM

## 2022-09-27 DIAGNOSIS — R06.02 SHORTNESS OF BREATH: ICD-10-CM

## 2022-09-27 PROCEDURE — 3288F PR FALLS RISK ASSESSMENT DOCUMENTED: ICD-10-PCS | Mod: HCNC,CPTII,S$GLB, | Performed by: NURSE PRACTITIONER

## 2022-09-27 PROCEDURE — 99999 PR PBB SHADOW E&M-EST. PATIENT-LVL V: ICD-10-PCS | Mod: PBBFAC,HCNC,, | Performed by: NURSE PRACTITIONER

## 2022-09-27 PROCEDURE — 99215 OFFICE O/P EST HI 40 MIN: CPT | Mod: HCNC,S$GLB,, | Performed by: NURSE PRACTITIONER

## 2022-09-27 PROCEDURE — 1101F PT FALLS ASSESS-DOCD LE1/YR: CPT | Mod: HCNC,CPTII,S$GLB, | Performed by: NURSE PRACTITIONER

## 2022-09-27 PROCEDURE — 3288F FALL RISK ASSESSMENT DOCD: CPT | Mod: HCNC,CPTII,S$GLB, | Performed by: NURSE PRACTITIONER

## 2022-09-27 PROCEDURE — 99215 PR OFFICE/OUTPT VISIT, EST, LEVL V, 40-54 MIN: ICD-10-PCS | Mod: HCNC,S$GLB,, | Performed by: NURSE PRACTITIONER

## 2022-09-27 PROCEDURE — 1101F PR PT FALLS ASSESS DOC 0-1 FALLS W/OUT INJ PAST YR: ICD-10-PCS | Mod: HCNC,CPTII,S$GLB, | Performed by: NURSE PRACTITIONER

## 2022-09-27 PROCEDURE — 1125F PR PAIN SEVERITY QUANTIFIED, PAIN PRESENT: ICD-10-PCS | Mod: HCNC,CPTII,S$GLB, | Performed by: NURSE PRACTITIONER

## 2022-09-27 PROCEDURE — 99999 PR PBB SHADOW E&M-EST. PATIENT-LVL V: CPT | Mod: PBBFAC,HCNC,, | Performed by: NURSE PRACTITIONER

## 2022-09-27 PROCEDURE — 1125F AMNT PAIN NOTED PAIN PRSNT: CPT | Mod: HCNC,CPTII,S$GLB, | Performed by: NURSE PRACTITIONER

## 2022-09-27 RX ORDER — ALPRAZOLAM 0.25 MG/1
0.25 TABLET ORAL NIGHTLY PRN
Qty: 30 TABLET | Refills: 2 | Status: SHIPPED | OUTPATIENT
Start: 2022-09-27 | End: 2022-12-12
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
## 2022-09-29 ENCOUNTER — HOSPITAL ENCOUNTER (OUTPATIENT)
Dept: RADIOLOGY | Facility: HOSPITAL | Age: 87
Discharge: HOME | End: 2022-09-29
Attending: NURSE PRACTITIONER
Payer: COMMERCIAL

## 2022-09-29 ENCOUNTER — OFFICE VISIT (OUTPATIENT)
Dept: CARDIOLOGY | Facility: CLINIC | Age: 87
End: 2022-09-29
Payer: COMMERCIAL

## 2022-09-29 ENCOUNTER — PATIENT MESSAGE (OUTPATIENT)
Dept: PRIMARY CARE CLINIC | Facility: CLINIC | Age: 87
End: 2022-09-29
Payer: MEDICARE

## 2022-09-29 VITALS
OXYGEN SATURATION: 98 % | HEIGHT: 64 IN | BODY MASS INDEX: 31.81 KG/M2 | SYSTOLIC BLOOD PRESSURE: 134 MMHG | HEART RATE: 93 BPM | DIASTOLIC BLOOD PRESSURE: 75 MMHG | WEIGHT: 186.31 LBS

## 2022-09-29 DIAGNOSIS — I70.0 CALCIFICATION OF AORTA: ICD-10-CM

## 2022-09-29 DIAGNOSIS — I12.9 CKD STAGE 4 SECONDARY TO HYPERTENSION: ICD-10-CM

## 2022-09-29 DIAGNOSIS — N18.4 CKD STAGE 4 SECONDARY TO HYPERTENSION: ICD-10-CM

## 2022-09-29 DIAGNOSIS — I11.9 HYPERTENSIVE LEFT VENTRICULAR HYPERTROPHY, WITHOUT HEART FAILURE: ICD-10-CM

## 2022-09-29 DIAGNOSIS — I65.29 STENOSIS OF CAROTID ARTERY, UNSPECIFIED LATERALITY: ICD-10-CM

## 2022-09-29 DIAGNOSIS — I48.0 PAF (PAROXYSMAL ATRIAL FIBRILLATION): ICD-10-CM

## 2022-09-29 DIAGNOSIS — I49.3 PVC'S (PREMATURE VENTRICULAR CONTRACTIONS): ICD-10-CM

## 2022-09-29 DIAGNOSIS — I65.23 CAROTID ARTERY CALCIFICATION, BILATERAL: ICD-10-CM

## 2022-09-29 DIAGNOSIS — E78.2 MIXED HYPERLIPIDEMIA: ICD-10-CM

## 2022-09-29 DIAGNOSIS — I48.91 ATRIAL FIBRILLATION WITH RVR: Primary | ICD-10-CM

## 2022-09-29 DIAGNOSIS — R07.89 LEFT-SIDED CHEST WALL PAIN: ICD-10-CM

## 2022-09-29 DIAGNOSIS — I10 PRIMARY HYPERTENSION: ICD-10-CM

## 2022-09-29 PROCEDURE — 99999 PR PBB SHADOW E&M-EST. PATIENT-LVL IV: CPT | Mod: PBBFAC,HCNC,, | Performed by: INTERNAL MEDICINE

## 2022-09-29 PROCEDURE — 1125F PR PAIN SEVERITY QUANTIFIED, PAIN PRESENT: ICD-10-PCS | Mod: HCNC,CPTII,S$GLB, | Performed by: INTERNAL MEDICINE

## 2022-09-29 PROCEDURE — 99214 OFFICE O/P EST MOD 30 MIN: CPT | Mod: HCNC,S$GLB,, | Performed by: INTERNAL MEDICINE

## 2022-09-29 PROCEDURE — 1125F AMNT PAIN NOTED PAIN PRSNT: CPT | Mod: HCNC,CPTII,S$GLB, | Performed by: INTERNAL MEDICINE

## 2022-09-29 PROCEDURE — 71110 XR RIBS 3 VIEWS BILATERAL: ICD-10-PCS | Mod: 26,HCNC,, | Performed by: RADIOLOGY

## 2022-09-29 PROCEDURE — 71110 X-RAY EXAM RIBS BIL 3 VIEWS: CPT | Mod: TC,HCNC

## 2022-09-29 PROCEDURE — 1101F PT FALLS ASSESS-DOCD LE1/YR: CPT | Mod: HCNC,CPTII,S$GLB, | Performed by: INTERNAL MEDICINE

## 2022-09-29 PROCEDURE — 1160F PR REVIEW ALL MEDS BY PRESCRIBER/CLIN PHARMACIST DOCUMENTED: ICD-10-PCS | Mod: HCNC,CPTII,S$GLB, | Performed by: INTERNAL MEDICINE

## 2022-09-29 PROCEDURE — 1101F PR PT FALLS ASSESS DOC 0-1 FALLS W/OUT INJ PAST YR: ICD-10-PCS | Mod: HCNC,CPTII,S$GLB, | Performed by: INTERNAL MEDICINE

## 2022-09-29 PROCEDURE — 99214 PR OFFICE/OUTPT VISIT, EST, LEVL IV, 30-39 MIN: ICD-10-PCS | Mod: HCNC,S$GLB,, | Performed by: INTERNAL MEDICINE

## 2022-09-29 PROCEDURE — 3288F FALL RISK ASSESSMENT DOCD: CPT | Mod: HCNC,CPTII,S$GLB, | Performed by: INTERNAL MEDICINE

## 2022-09-29 PROCEDURE — 1159F PR MEDICATION LIST DOCUMENTED IN MEDICAL RECORD: ICD-10-PCS | Mod: HCNC,CPTII,S$GLB, | Performed by: INTERNAL MEDICINE

## 2022-09-29 PROCEDURE — 3288F PR FALLS RISK ASSESSMENT DOCUMENTED: ICD-10-PCS | Mod: HCNC,CPTII,S$GLB, | Performed by: INTERNAL MEDICINE

## 2022-09-29 PROCEDURE — 1159F MED LIST DOCD IN RCRD: CPT | Mod: HCNC,CPTII,S$GLB, | Performed by: INTERNAL MEDICINE

## 2022-09-29 PROCEDURE — 1160F RVW MEDS BY RX/DR IN RCRD: CPT | Mod: HCNC,CPTII,S$GLB, | Performed by: INTERNAL MEDICINE

## 2022-09-29 PROCEDURE — 71110 X-RAY EXAM RIBS BIL 3 VIEWS: CPT | Mod: 26,HCNC,, | Performed by: RADIOLOGY

## 2022-09-29 PROCEDURE — 99999 PR PBB SHADOW E&M-EST. PATIENT-LVL IV: ICD-10-PCS | Mod: PBBFAC,HCNC,, | Performed by: INTERNAL MEDICINE

## 2022-09-29 RX ORDER — DILTIAZEM HYDROCHLORIDE 60 MG/1
60 TABLET, FILM COATED ORAL 2 TIMES DAILY
Qty: 270 TABLET | Refills: 1 | Status: ON HOLD | OUTPATIENT
Start: 2022-09-29 | End: 2022-11-04 | Stop reason: HOSPADM

## 2022-09-29 NOTE — Clinical Note
HI pt just wanted to know if the ablation will be Oct 6th, has a ride now, she does not want to push it back too much. Thanks - PM
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
## 2022-09-29 NOTE — ASSESSMENT & PLAN NOTE
Suspect r/t Afib. Encouraged to f/u with pulmonologist, use Pox with ambulation, observe sat and HR. Schedule 6 minute walk test if no pulm appt soon.

## 2022-09-30 ENCOUNTER — TELEPHONE (OUTPATIENT)
Dept: ELECTROPHYSIOLOGY | Facility: CLINIC | Age: 87
End: 2022-09-30
Payer: MEDICARE

## 2022-09-30 NOTE — TELEPHONE ENCOUNTER
Spoke with Ms. Ifrah and scheduled her procedure for 11/3/22. Procedure details reviewed and instructions will be sent via mail and patient portal as requested.

## 2022-09-30 NOTE — TELEPHONE ENCOUNTER
----- Message from Josesito Flores MA sent at 9/30/2022  1:04 PM CDT -----  Regarding: FW: Procedure    ----- Message -----  From: Kassandra Friedman  Sent: 9/30/2022   1:03 PM CDT  To: [facility] Staff  Subject: Procedure                                        Pt called to speak with Adilene about scheduling a procedure.  PT can be reached @ [phone] or [phone]        Thanks

## 2022-10-10 DIAGNOSIS — I48.3 TYPICAL ATRIAL FLUTTER: ICD-10-CM

## 2022-10-10 DIAGNOSIS — I48.0 PAROXYSMAL ATRIAL FIBRILLATION: Primary | ICD-10-CM

## 2022-10-11 ENCOUNTER — TELEPHONE (OUTPATIENT)
Dept: PRIMARY CARE CLINIC | Facility: CLINIC | Age: 87
End: 2022-10-11
Payer: MEDICARE

## 2022-10-11 NOTE — TELEPHONE ENCOUNTER
----- Message from Charlotte Jolly sent at 10/11/2022  1:30 PM CDT -----  Contact: pt [phone]  Patient states her shortness of breath has gotten worst. Please call and advise.    Thank you and have a great day.

## 2022-10-11 NOTE — TELEPHONE ENCOUNTER
Patient states that BP/Pulse has been fine.     No cough and O2 level normal.     Has been taking Lasix.     C/O SOB that has gotten worse since she last saw you.     Gets very winded when walking.     Hard to walk to get meals, do laundry, make bed.     States that she has also loss appetite    Having A-Fib procedure on November 3rd.     Afraid that she will not be able to have procedure.     Has labs scheduled for October 25th for upcoming procedure.

## 2022-10-12 ENCOUNTER — OFFICE VISIT (OUTPATIENT)
Dept: PRIMARY CARE CLINIC | Facility: CLINIC | Age: 87
End: 2022-10-12
Payer: COMMERCIAL

## 2022-10-12 ENCOUNTER — PATIENT MESSAGE (OUTPATIENT)
Dept: ELECTROPHYSIOLOGY | Facility: CLINIC | Age: 87
End: 2022-10-12
Payer: MEDICARE

## 2022-10-12 VITALS
HEIGHT: 64 IN | TEMPERATURE: 99 F | WEIGHT: 183.63 LBS | BODY MASS INDEX: 31.35 KG/M2 | HEART RATE: 74 BPM | OXYGEN SATURATION: 95 %

## 2022-10-12 DIAGNOSIS — R06.09 DOE (DYSPNEA ON EXERTION): Primary | ICD-10-CM

## 2022-10-12 PROCEDURE — 1159F MED LIST DOCD IN RCRD: CPT | Mod: HCNC,CPTII,S$GLB, | Performed by: NURSE PRACTITIONER

## 2022-10-12 PROCEDURE — 99999 PR PBB SHADOW E&M-EST. PATIENT-LVL IV: CPT | Mod: PBBFAC,HCNC,, | Performed by: NURSE PRACTITIONER

## 2022-10-12 PROCEDURE — 99215 OFFICE O/P EST HI 40 MIN: CPT | Mod: HCNC,S$GLB,, | Performed by: NURSE PRACTITIONER

## 2022-10-12 PROCEDURE — 99215 PR OFFICE/OUTPT VISIT, EST, LEVL V, 40-54 MIN: ICD-10-PCS | Mod: HCNC,S$GLB,, | Performed by: NURSE PRACTITIONER

## 2022-10-12 PROCEDURE — 1159F PR MEDICATION LIST DOCUMENTED IN MEDICAL RECORD: ICD-10-PCS | Mod: HCNC,CPTII,S$GLB, | Performed by: NURSE PRACTITIONER

## 2022-10-12 PROCEDURE — 1101F PR PT FALLS ASSESS DOC 0-1 FALLS W/OUT INJ PAST YR: ICD-10-PCS | Mod: HCNC,CPTII,S$GLB, | Performed by: NURSE PRACTITIONER

## 2022-10-12 PROCEDURE — 1160F PR REVIEW ALL MEDS BY PRESCRIBER/CLIN PHARMACIST DOCUMENTED: ICD-10-PCS | Mod: HCNC,CPTII,S$GLB, | Performed by: NURSE PRACTITIONER

## 2022-10-12 PROCEDURE — 1101F PT FALLS ASSESS-DOCD LE1/YR: CPT | Mod: HCNC,CPTII,S$GLB, | Performed by: NURSE PRACTITIONER

## 2022-10-12 PROCEDURE — 3288F PR FALLS RISK ASSESSMENT DOCUMENTED: ICD-10-PCS | Mod: HCNC,CPTII,S$GLB, | Performed by: NURSE PRACTITIONER

## 2022-10-12 PROCEDURE — 3288F FALL RISK ASSESSMENT DOCD: CPT | Mod: HCNC,CPTII,S$GLB, | Performed by: NURSE PRACTITIONER

## 2022-10-12 PROCEDURE — 99999 PR PBB SHADOW E&M-EST. PATIENT-LVL IV: ICD-10-PCS | Mod: PBBFAC,HCNC,, | Performed by: NURSE PRACTITIONER

## 2022-10-12 PROCEDURE — 1160F RVW MEDS BY RX/DR IN RCRD: CPT | Mod: HCNC,CPTII,S$GLB, | Performed by: NURSE PRACTITIONER

## 2022-10-12 NOTE — Clinical Note
Good afternoon - I saw Ms Rg in clinic today for reported increased RUIZ. Exam at her baseline. She had similar sx previously while taking amiodarone that improved once amiodarone stopped.  We discussed continuing amiodarone until ablation 11/3. Any other recommendations? She also had questions about her upcoming procedure, mainly any prep. I explained that a nurse will probably contact her the week prior to procedure to clarify. If not correct - let me know.   Thanks

## 2022-10-12 NOTE — PROGRESS NOTES
Shilrey Metz  10/12/2022  [id number]    Heather Miller NP  Patient Care Team:  Heather Miller NP as PCP - General (Family Medicine)  Wilbert Ware MD as Consulting Physician (Ophthalmology)  Rajinder Quintanilla MD as Consulting Physician (Ophthalmology)  Suzan Gregorio PA-C as Physician Assistant (Rheumatology)  Rosario Valadez MD as Consulting Physician (Dermatology)  Trevon Ramirez MD as Consulting Physician (Cardiology)  Amish Mendoza MD (Pulmonary Disease)  Jaquan Choi MD as Consulting Physician (Vascular Surgery)  Debbie Choi MD as Consulting Physician (Gynecology)  Emmanuel Fish MD as Consulting Physician (Hand Surgery)  PAXTON Chaidez Jr., MD as Consulting Physician (Orthopedic Surgery)  Phylicia Deleon MD as Physician (Internal Medicine)      [facility] Care Note      Chief Complaint:  Chief Complaint   Patient presents with    Shortness of Breath     Patient in with a complaint of shortness of breath that started a couple of months ago       History of Present Illness:  HPI    Increased dyspnea with exertion reported by pt. Tx with increased furosemide frequency for pulmonary edema 9/27. Decreased cough with increased Lasix dose, no longer coughing, uncertain whether dyspnea is improved.     Pox typically 93-96%, HR 70s, occasionally low 100s when very active, relieved quickly by rest. BP well controlled.   Now with some nausea, decreased appetite.      Per Dr Kelly, Last anti-coagulation dose night prior to procedure. Discontinue antiarrhytmic drugs 4 days prior to the procedure.     Sleeps on two pillows. Long-term. 3 lb weight loss since last visit.     No increased anxiety, no leg pain/swelling, no palpitations. No difficulty swallowing.           Review of Systems   Constitutional:  Negative for chills, diaphoresis and fever.   Respiratory:  Positive for shortness of breath. Negative for cough and wheezing.    Cardiovascular:  Negative for chest  pain, palpitations and leg swelling.   Gastrointestinal:  Negative for heartburn, nausea and vomiting.   Genitourinary:  Negative for dysuria.   Musculoskeletal:  Negative for myalgias.   Neurological:  Negative for dizziness and headaches.       The following were reviewed: Active problem list, medication list, allergies, family history, social history, and Health Maintenance.     History:  Past Medical History:   Diagnosis Date    Anemia 11/29/2018    Anxiety 11/28/2017    Arthritis     Atrial fibrillation with RVR 10/9/2019    Back pain     Basal cell carcinoma 03/29/2018    left mid back    Chronic diastolic congestive heart failure 10/15/2019    CKD (chronic kidney disease) stage 3, GFR 30-59 ml/min 8/8/2016    Colon polyps     reported per patient.     Coronary artery calcification 10/5/2021    Fuchs' corneal dystrophy     General anesthetics causing adverse effect in therapeutic use     woke up during surgery and gagged on ET tube    Glaucoma     Glaucoma     Heart failure with preserved left ventricular function (HFpEF) 7/24/2018    Hyperlipidemia     Hypertension     Macular degeneration dry    Obesity     PVD (peripheral vascular disease) 4/26/2021    PVD (peripheral vascular disease) 4/26/2021    Squamous cell carcinoma 01/08/2019    left shoulder    Stenosis of carotid artery 10/5/2021    Trouble in sleeping     Type 2 diabetes mellitus without complication, without long-term current use of insulin 2/24/2021    Urinary tract infection      Past Surgical History:   Procedure Laterality Date    ADENOIDECTOMY  1938    BREAST BIOPSY Left     1997. benign    BREAST CYST EXCISION Left 1997 approx    CARPAL TUNNEL RELEASE Right 2012 approx    CATARACT EXTRACTION Bilateral 1994    CHOLECYSTECTOMY  1975    CORNEAL TRANSPLANT Bilateral 08/2015 8/2015 - left; Right 4/2016    EYE SURGERY      Fuch's Corneal dystrophy - had 2nd operation with Dr. Quintanilla    EYE SURGERY      Cataract wIOL    left thumb Left 2011     removed cuboid for arthritis    REVERSE TOTAL SHOULDER ARTHROPLASTY Left 8/21/2018    Procedure: ARTHROPLASTY, SHOULDER, TOTAL, REVERSE;  Surgeon: Solomon Lujan MD;  Location: [facility] OR;  Service: Orthopedics;  Laterality: Left;  WITH BICEP TENODESIS    SKIN CANCER EXCISION Left 2017    BCC removal by Dr. Rory MAHONEY- OS (aka TRABECULOPLASTY) Left 05/11/2011    repeat 3/14/12    TENOTOMY Left 8/21/2018    Procedure: TENOTOMY;  Surgeon: Solomon Lujan MD;  Location: [facility] OR;  Service: Orthopedics;  Laterality: Left;    TONSILLECTOMY  1938    TREATMENT OF CARDIAC ARRHYTHMIA N/A 10/10/2019    Procedure: CARDIOVERSION;  Surgeon: Pete Durán MD;  Location: [facility] CATH LAB;  Service: Cardiology;  Laterality: N/A;    TREATMENT OF CARDIAC ARRHYTHMIA N/A 12/6/2019    Procedure: CARDIOVERSION;  Surgeon: Samy Castillo MD;  Location: [facility] CATH LAB;  Service: Cardiology;  Laterality: N/A;     Family History   Problem Relation Age of Onset    Heart disease Mother     Hypertension Mother     Cancer Father 88        sarcoma( ?Kaposi's ) on leg    Deep vein thrombosis Neg Hx     Ovarian cancer Neg Hx     Breast cancer Neg Hx     Kidney disease Neg Hx     Strabismus Neg Hx     Retinal detachment Neg Hx     Macular degeneration Neg Hx     Glaucoma Neg Hx     Blindness Neg Hx     Amblyopia Neg Hx     Eczema Neg Hx     Lupus Neg Hx     Melanoma Neg Hx     Psoriasis Neg Hx     Diabetes Neg Hx     Stroke Neg Hx     Mental retardation Neg Hx     Mental illness Neg Hx     Hyperlipidemia Neg Hx     COPD Neg Hx     Asthma Neg Hx     Depression Neg Hx     Alcohol abuse Neg Hx     Drug abuse Neg Hx      Social History     Socioeconomic History    Marital status:     Number of children: 3   Tobacco Use    Smoking status: Never    Smokeless tobacco: Never    Tobacco comments:     history of passive smoking from her ex-   Substance and Sexual Activity    Alcohol use: Yes     Alcohol/week: 2.0 standard drinks     Types: 2  Standard drinks or equivalent per week     Comment: occ    Drug use: No    Sexual activity: Not Currently     Partners: Male     Birth control/protection: Post-menopausal     Comment: discussed protection for STD's   Other Topics Concern    Are you pregnant or think you may be? No    Breast-feeding No   Social History Narrative     x 2,  x 1. Retired artist - previously doing jewelry/wall pieces; ; lives in [place]; has 3 sons - 52( lives in [place], 54, and 56;exercises minimally - limited due to back issues. Still drives. Does have a Living Will.     Social Determinants of Health     Physical Activity: Unknown    Days of Exercise per Week: Patient refused    Minutes of Exercise per Session: 10 min   Stress: No Stress Concern Present    Feeling of Stress : Not at all     Patient Active Problem List   Diagnosis    HTN (hypertension)    Mixed hyperlipidemia    Primary osteoarthritis involving multiple joints    Macular degeneration - Both Eyes    Fuch's endothelial dystrophy - Both Eyes    Lens replaced by other means    COAG (chronic open-angle glaucoma) - Both Eyes    Blepharitis, unspecified - Both Eyes    Shortness of breath    Abnormal ECG    PVC's (premature ventricular contractions)    Other microscopic hematuria    LVH (left ventricular hypertrophy) due to hypertensive disease    Osteopenia of hip    Myalgia    Calcification of aorta    Neuropathy    Unequal blood pressures in paired extremities    Medication side effects    History of cornea transplant    Pseudophakia    Insomnia    Anxiety    Pre-diabetes    Obesity (BMI 30.0-34.9)    Elevated troponin    Bilateral shoulder region arthritis    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    Anemia    Thrombus of left atrial appendage    PAF (paroxysmal atrial fibrillation)    Elevated liver enzymes    Atrial flutter with rapid ventricular response    Chronic  anticoagulation    Keratitis sicca, bilateral    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    PAD (peripheral artery disease)    Statin intolerance    Pulmonary nodules/lesions, multiple    Spinal stenosis at L4-L5 level    Pulmonary granuloma    Lumbar spondylosis    Bilateral recurrent inguinal hernia without obstruction or gangrene    LLQ abdominal pain    Pain in left leg    Rectal abnormality    Stenosis of carotid artery    Carotid artery calcification, bilateral    B12 deficiency    Seasonal allergic rhinitis    Atrial fibrillation with RVR    Elevated serum creatinine    Coagulopathy     Review of patient's allergies indicates:   Allergen Reactions    Carvedilol Swelling     lips  lips  Other reaction(s): swelling    Cephalexin Other (See Comments)     Muscle/joint weakness unable to move     Doxycycline calcium Other (See Comments)     Weakness nausea   sob  Other reaction(s): weakness, nausea, SOB    Erythromycin Other (See Comments)     Complete weakness/could not walk    Gadolinium-containing contrast media Other (See Comments)     angioedema  Other reaction(s): Angioedema    Hydrocodone-acetaminophen      wheezing  wheezing  wheezing  Other reaction(s): wheezing    Inveltys [loteprednol etabonate] Palpitations and Other (See Comments)     Patient stated bp went up.    Labetalol Shortness Of Breath, Nausea Only and Other (See Comments)     Palpitations, LE weakness  Other reaction(s): SOB, palpitations, weakness    Loratadine Other (See Comments)     Double vision/spots  Other reaction(s): double vision    Macrolide antibiotics Other (See Comments)     Complete weakness/could not walk  Complete weakness/could not walk  Complete weakness/could not walk  Complete weakness/could not walk  Other reaction(s): complete weakness    Moxifloxacin Other (See Comments)     Hives   muscle soreness  Other reaction(s): hives, muscle soreness    Naproxen      wheezing  wheezing  wheezing  Other reaction(s): wheezing     Statins-hmg-coa reductase inhibitors Other (See Comments)     Severe Muscle weakness  Muscle weakness  Severe Muscle weakness  Other reaction(s): severe muscle weakness    Timolol Other (See Comments)     Severe muscle weakness, eye problems.  Other reaction(s): severe muscle weakness    Verapamil Diarrhea     Severe diarrhea.  Other reaction(s): diarrhea    Amlodipine      Myalgias    Other reaction(s): myalgian    Doxycycline     Doxycycline hyclate      Other reaction(s): weakness, nausea, SOB    Fenofibrate      Causes muscle weakness  Other reaction(s): muscle weakness    Hydralazine      Other reaction(s): muscle tremors    Hydralazine analogues      Muscle tremors/aches      Hydrocortisone     Isosorbide      Tolerates Imdur    Loteprednol      Other reaction(s): increased BP    Tetanus and diphtheria toxoids     Adhesive Rash     Clonidine patch - 2 mfg - contact dermatitis    Codeine Diarrhea and Other (See Comments)     Loss of balance   Other reaction(s): loss of balance    Doxazosin Palpitations     Other reaction(s): palpitations    Fexofenadine Other (See Comments)     Double vision/spots   Other reaction(s): double vision    Hydrocortisone (bulk) Other (See Comments)     Only suppository/ caused muscle weakness    Latanoprost Other (See Comments)     Muscle weakness  Other reaction(s): muscle weakness    Olopatadine Other (See Comments)     Muscle weakness  Other reaction(s): muscle weakness    Prednisone Anxiety       Medications:  Current Outpatient Medications on File Prior to Visit   Medication Sig Dispense Refill    acetaminophen (TYLENOL) 500 MG tablet Take 500 mg by mouth daily as needed. Taking 1 to 3      ALPRAZolam (XANAX) 0.25 MG tablet Take 1 tablet (0.25 mg total) by mouth nightly as needed for Insomnia or Anxiety. 30 tablet 2    amiodarone (PACERONE) 200 MG Tab Take 1 tablet (200 mg total) by mouth every evening. After 4 weeks continue amiodarone 200mg daily 30 tablet 11    apixaban  (ELIQUIS) 2.5 mg Tab Take 1 tablet (2.5 mg total) by mouth 2 (two) times daily. 180 tablet 3    b complex vitamins capsule Take 1 capsule by mouth once daily.      cholecalciferol, vitamin D3, (VITAMIN D3) 50 mcg (2,000 unit) Cap Take 1 capsule by mouth once daily.      coenzyme Q10 200 mg capsule Take 400 mg by mouth once daily.       diltiaZEM (CARDIZEM) 60 MG tablet Take 1 tablet (60 mg total) by mouth 2 (two) times a day. 270 tablet 1    fish oil-omega-3 fatty acids 300-1,000 mg capsule Take 2 g by mouth 2 (two) times daily.       furosemide (LASIX) 40 MG tablet Take 1 tablet (40 mg total) by mouth daily as needed (edema, swelling, fluid). 60 tablet 3    hydroCHLOROthiazide (HYDRODIURIL) 25 MG tablet Take 1 tablet (25 mg total) by mouth once daily. 90 tablet 1    losartan (COZAAR) 25 MG tablet Take 1 tablet (25 mg total) by mouth once daily. 180 tablet 1    metoprolol tartrate (LOPRESSOR) 25 MG tablet Take 1 tablet (25 mg total) by mouth 2 (two) times daily. 180 tablet 1    POLYETHYLENE GLYCOL 3350 (MIRALAX ORAL) Take 17 g by mouth 2 (two) times a day. Taking BID      RHOPRESSA 0.02 % ophthalmic solution PLACE 1 DROP INTO THE LEFT EYE EVERY EVENING. 2.5 mL 11    travoprost (TRAVATAN Z) 0.004 % ophthalmic solution PLACE 1 DROP INTO THE LEFT EYE EVERY EVENING. 2.5 mL 10    mupirocin (BACTROBAN) 2 % ointment apply to affected area twice a day with Q-tip. Antibiotic ointment. 30 g 1    [DISCONTINUED] cloNIDine (CATAPRES) 0.1 MG tablet Take 1 tablet (0.1 mg total) by mouth 2 (two) times daily as needed (systolic BP over 180). 20 tablet 1    [DISCONTINUED] isosorbide mononitrate (IMDUR) 30 MG 24 hr tablet Take 1 tablet (30 mg total) by mouth every evening. 90 tablet 1     Current Facility-Administered Medications on File Prior to Visit   Medication Dose Route Frequency Provider Last Rate Last Admin    sodium chloride 0.9% flush 3 mL  3 mL Intravenous PRN Steve Galarza PA-C           Medications have been  reviewed and reconciled with patient at visit today.    Barriers to medications present (no )    Fall since last office visit (no )      Exam:  Vitals:    10/12/22 1359   Pulse: 74   Temp: 98.5 °F (36.9 °C)     Weight: 83.3 kg (183 lb 9.6 oz)   Body mass index is 31.51 kg/m².      BP Readings from Last 3 Encounters:   09/29/22 134/75   09/27/22 (!) 150/80   09/21/22 138/80     Wt Readings from Last 3 Encounters:   10/12/22 1359 83.3 kg (183 lb 9.6 oz)   09/29/22 1312 84.5 kg (186 lb 4.6 oz)   09/27/22 1308 83.6 kg (184 lb 6.4 oz)            Physical Exam  Constitutional:       General: She is not in acute distress.  HENT:      Mouth/Throat:      Mouth: Mucous membranes are moist.   Eyes:      General: No scleral icterus.  Cardiovascular:      Rate and Rhythm: Normal rate. Rhythm irregular.   Pulmonary:      Effort: No respiratory distress.      Breath sounds: Normal breath sounds.   Abdominal:      General: There is no distension.      Palpations: Abdomen is soft.      Tenderness: There is no abdominal tenderness.   Musculoskeletal:         General: No swelling.      Cervical back: Neck supple.   Skin:     General: Skin is warm and dry.   Neurological:      Mental Status: She is alert. Mental status is at baseline.   Psychiatric:         Mood and Affect: Mood normal.         Thought Content: Thought content normal.       Laboratory Reviewed: (Yes)  Lab Results   Component Value Date    WBC 8.41 08/31/2022    HGB 11.6 (L) 08/31/2022    HCT 37.3 08/31/2022     08/31/2022    CHOL 168 08/31/2022    TRIG 98 08/31/2022    HDL 43 08/31/2022    ALT 11 09/29/2022    AST 15 09/29/2022     09/29/2022    K 4.2 09/29/2022     09/29/2022    CREATININE 1.7 (H) 09/29/2022    BUN 33 (H) 09/29/2022    CO2 23 09/29/2022    TSH 3.826 08/31/2022    INR 1.0 02/12/2020    HGBA1C 6.2 (H) 08/31/2022           Health Maintenance  Health Maintenance Topics with due status: Not Due       Topic Last Completion Date     Pneumococcal Vaccines (Age 65+) 03/16/2022    Lipid Panel 08/31/2022     Health Maintenance Due   Topic Date Due    Shingles Vaccine (1 of 2) Never done    TETANUS VACCINE  11/09/2021    COVID-19 Vaccine (4 - Booster for Pfizer series) 11/24/2021    Influenza Vaccine (1) 09/01/2022       Assessment:  Problem List Items Addressed This Visit    None  Visit Diagnoses       RUIZ (dyspnea on exertion)    -  Primary    exam at baseline.  does not appear in fluid overload. continue daily weights, furosemide. Similar sx previously with amiodarone. Message to cardiology.              Plan:  RUIZ (dyspnea on exertion)  Comments:  exam at baseline.  does not appear in fluid overload. continue daily weights, furosemide. Similar sx previously with amiodarone. Message to cardiology.    -Patient's lab results were reviewed and discussed with patient  -Treatment options and alternatives were discussed with the patient. Patient expressed understanding. Patient was given the opportunity to ask questions and be an active participant in their medical care. Patient had no further questions or concerns at this time.   -Documentation of patient's health and condition was obtained from family member who was present during visit.  -Patient is an overall moderate risk for health complications from their medical conditions.       Follow up: Follow up in about 2 months (around 12/12/2022).      After visit summary printed and given to patient upon discharge.  Patient goals and care plan are included in After visit summary.    Total medical decision making time was 65 min.  The following issues were discussed: The encounter diagnosis was RUIZ (dyspnea on exertion).    Health maintenance needs, recent test results and goals of care discussed with pt and questions answered.

## 2022-10-13 ENCOUNTER — OFFICE VISIT (OUTPATIENT)
Dept: CARDIOLOGY | Facility: CLINIC | Age: 87
End: 2022-10-13
Payer: COMMERCIAL

## 2022-10-13 ENCOUNTER — TELEPHONE (OUTPATIENT)
Dept: CARDIOLOGY | Facility: CLINIC | Age: 87
End: 2022-10-13

## 2022-10-13 ENCOUNTER — LAB VISIT (OUTPATIENT)
Dept: LAB | Facility: HOSPITAL | Age: 87
End: 2022-10-13
Attending: INTERNAL MEDICINE
Payer: MEDICARE

## 2022-10-13 VITALS
SYSTOLIC BLOOD PRESSURE: 132 MMHG | DIASTOLIC BLOOD PRESSURE: 60 MMHG | WEIGHT: 183.63 LBS | BODY MASS INDEX: 31.35 KG/M2 | HEART RATE: 77 BPM | OXYGEN SATURATION: 91 % | HEIGHT: 64 IN

## 2022-10-13 DIAGNOSIS — I48.0 PAF (PAROXYSMAL ATRIAL FIBRILLATION): Primary | ICD-10-CM

## 2022-10-13 DIAGNOSIS — N18.4 CKD STAGE 4 SECONDARY TO HYPERTENSION: ICD-10-CM

## 2022-10-13 DIAGNOSIS — I11.9 HYPERTENSIVE LEFT VENTRICULAR HYPERTROPHY, WITHOUT HEART FAILURE: ICD-10-CM

## 2022-10-13 DIAGNOSIS — I12.9 CKD STAGE 4 SECONDARY TO HYPERTENSION: ICD-10-CM

## 2022-10-13 DIAGNOSIS — I50.32 CHRONIC HEART FAILURE WITH PRESERVED EJECTION FRACTION: ICD-10-CM

## 2022-10-13 DIAGNOSIS — I48.92 ATRIAL FLUTTER WITH RAPID VENTRICULAR RESPONSE: ICD-10-CM

## 2022-10-13 DIAGNOSIS — I65.29 STENOSIS OF CAROTID ARTERY, UNSPECIFIED LATERALITY: ICD-10-CM

## 2022-10-13 DIAGNOSIS — I48.0 PAF (PAROXYSMAL ATRIAL FIBRILLATION): ICD-10-CM

## 2022-10-13 DIAGNOSIS — I10 PRIMARY HYPERTENSION: ICD-10-CM

## 2022-10-13 DIAGNOSIS — I65.23 CAROTID ARTERY CALCIFICATION, BILATERAL: ICD-10-CM

## 2022-10-13 DIAGNOSIS — I49.3 PVC'S (PREMATURE VENTRICULAR CONTRACTIONS): ICD-10-CM

## 2022-10-13 DIAGNOSIS — I70.0 CALCIFICATION OF AORTA: ICD-10-CM

## 2022-10-13 DIAGNOSIS — I73.9 PAD (PERIPHERAL ARTERY DISEASE): ICD-10-CM

## 2022-10-13 DIAGNOSIS — E78.2 MIXED HYPERLIPIDEMIA: ICD-10-CM

## 2022-10-13 DIAGNOSIS — I50.33 ACUTE ON CHRONIC HEART FAILURE WITH PRESERVED EJECTION FRACTION: Primary | ICD-10-CM

## 2022-10-13 LAB
ANION GAP SERPL CALC-SCNC: 12 MMOL/L (ref 8–16)
BNP SERPL-MCNC: 302 PG/ML (ref 0–99)
BUN SERPL-MCNC: 34 MG/DL (ref 8–23)
CALCIUM SERPL-MCNC: 9 MG/DL (ref 8.7–10.5)
CHLORIDE SERPL-SCNC: 106 MMOL/L (ref 95–110)
CO2 SERPL-SCNC: 24 MMOL/L (ref 23–29)
CREAT SERPL-MCNC: 2 MG/DL (ref 0.5–1.4)
EST. GFR  (NO RACE VARIABLE): 23 ML/MIN/1.73 M^2
GLUCOSE SERPL-MCNC: 157 MG/DL (ref 70–110)
MAGNESIUM SERPL-MCNC: 2 MG/DL (ref 1.6–2.6)
POTASSIUM SERPL-SCNC: 4 MMOL/L (ref 3.5–5.1)
SODIUM SERPL-SCNC: 142 MMOL/L (ref 136–145)

## 2022-10-13 PROCEDURE — 1160F RVW MEDS BY RX/DR IN RCRD: CPT | Mod: HCNC,CPTII,S$GLB, | Performed by: INTERNAL MEDICINE

## 2022-10-13 PROCEDURE — 1159F MED LIST DOCD IN RCRD: CPT | Mod: HCNC,CPTII,S$GLB, | Performed by: INTERNAL MEDICINE

## 2022-10-13 PROCEDURE — 1160F PR REVIEW ALL MEDS BY PRESCRIBER/CLIN PHARMACIST DOCUMENTED: ICD-10-PCS | Mod: HCNC,CPTII,S$GLB, | Performed by: INTERNAL MEDICINE

## 2022-10-13 PROCEDURE — 3288F FALL RISK ASSESSMENT DOCD: CPT | Mod: HCNC,CPTII,S$GLB, | Performed by: INTERNAL MEDICINE

## 2022-10-13 PROCEDURE — 99214 PR OFFICE/OUTPT VISIT, EST, LEVL IV, 30-39 MIN: ICD-10-PCS | Mod: HCNC,S$GLB,, | Performed by: INTERNAL MEDICINE

## 2022-10-13 PROCEDURE — 1126F AMNT PAIN NOTED NONE PRSNT: CPT | Mod: HCNC,CPTII,S$GLB, | Performed by: INTERNAL MEDICINE

## 2022-10-13 PROCEDURE — 36415 COLL VENOUS BLD VENIPUNCTURE: CPT | Mod: HCNC | Performed by: INTERNAL MEDICINE

## 2022-10-13 PROCEDURE — 1101F PT FALLS ASSESS-DOCD LE1/YR: CPT | Mod: HCNC,CPTII,S$GLB, | Performed by: INTERNAL MEDICINE

## 2022-10-13 PROCEDURE — 1126F PR PAIN SEVERITY QUANTIFIED, NO PAIN PRESENT: ICD-10-PCS | Mod: HCNC,CPTII,S$GLB, | Performed by: INTERNAL MEDICINE

## 2022-10-13 PROCEDURE — 83735 ASSAY OF MAGNESIUM: CPT | Mod: HCNC | Performed by: INTERNAL MEDICINE

## 2022-10-13 PROCEDURE — 3288F PR FALLS RISK ASSESSMENT DOCUMENTED: ICD-10-PCS | Mod: HCNC,CPTII,S$GLB, | Performed by: INTERNAL MEDICINE

## 2022-10-13 PROCEDURE — 99214 OFFICE O/P EST MOD 30 MIN: CPT | Mod: HCNC,S$GLB,, | Performed by: INTERNAL MEDICINE

## 2022-10-13 PROCEDURE — 1159F PR MEDICATION LIST DOCUMENTED IN MEDICAL RECORD: ICD-10-PCS | Mod: HCNC,CPTII,S$GLB, | Performed by: INTERNAL MEDICINE

## 2022-10-13 PROCEDURE — 99999 PR PBB SHADOW E&M-EST. PATIENT-LVL V: ICD-10-PCS | Mod: PBBFAC,HCNC,, | Performed by: INTERNAL MEDICINE

## 2022-10-13 PROCEDURE — 80048 BASIC METABOLIC PNL TOTAL CA: CPT | Mod: HCNC | Performed by: INTERNAL MEDICINE

## 2022-10-13 PROCEDURE — 1101F PR PT FALLS ASSESS DOC 0-1 FALLS W/OUT INJ PAST YR: ICD-10-PCS | Mod: HCNC,CPTII,S$GLB, | Performed by: INTERNAL MEDICINE

## 2022-10-13 PROCEDURE — 99999 PR PBB SHADOW E&M-EST. PATIENT-LVL V: CPT | Mod: PBBFAC,HCNC,, | Performed by: INTERNAL MEDICINE

## 2022-10-13 PROCEDURE — 83880 ASSAY OF NATRIURETIC PEPTIDE: CPT | Mod: HCNC | Performed by: INTERNAL MEDICINE

## 2022-10-13 RX ORDER — FUROSEMIDE 40 MG/1
40 TABLET ORAL DAILY PRN
Qty: 180 TABLET | Refills: 1 | Status: SHIPPED | OUTPATIENT
Start: 2022-10-13 | End: 2022-10-13

## 2022-10-13 RX ORDER — FUROSEMIDE 40 MG/1
40 TABLET ORAL DAILY
Qty: 180 TABLET | Refills: 1 | Status: SHIPPED | OUTPATIENT
Start: 2022-10-13 | End: 2022-10-19
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
## 2022-10-13 NOTE — TELEPHONE ENCOUNTER
Pt notified and verbalized understanding Labs set up for Wednesday when the pt can get a ride     pb   l----- Message from Trevon Ramirez MD sent at 10/13/2022  4:28 PM CDT -----  Please contact the patient and let them know that their results reveal worsening BNP due to more fluid, for now take lasix 40mg twice a day for 1 week and have labs repeated next week - if BNP is improving and feeling less fluid/breathing issues can decrease lasix to 40mg daily.

## 2022-10-17 ENCOUNTER — PATIENT MESSAGE (OUTPATIENT)
Dept: ELECTROPHYSIOLOGY | Facility: CLINIC | Age: 87
End: 2022-10-17
Payer: MEDICARE

## 2022-10-17 ENCOUNTER — TELEPHONE (OUTPATIENT)
Dept: ELECTROPHYSIOLOGY | Facility: CLINIC | Age: 87
End: 2022-10-17
Payer: MEDICARE

## 2022-10-17 NOTE — TELEPHONE ENCOUNTER
I spoke with Ms. Ifrah who let me know that she did not see her instructions in her portal that were sent this morning. I let her know that I would send them again. We reviewed the instructions verbally as well and I let her know that she could call with any questions. She verbalized understanding and appreciated the call.

## 2022-10-17 NOTE — TELEPHONE ENCOUNTER
----- Message from Farideh Ramos MA sent at 10/17/2022 11:02 AM CDT -----  Regarding: FW: please call    ----- Message -----  From: Pamela Bowen  Sent: 10/17/2022  10:38 AM CDT  To: Robin Cm Staff  Subject: please call                                      The pt is calling to speak with Adilene. Please call her back @ [phone]. Thanks, Pamela

## 2022-10-19 ENCOUNTER — TELEPHONE (OUTPATIENT)
Dept: CARDIOLOGY | Facility: CLINIC | Age: 87
End: 2022-10-19
Payer: MEDICARE

## 2022-10-19 ENCOUNTER — LAB VISIT (OUTPATIENT)
Dept: LAB | Facility: HOSPITAL | Age: 87
End: 2022-10-19
Attending: INTERNAL MEDICINE
Payer: MEDICARE

## 2022-10-19 DIAGNOSIS — I50.33 ACUTE ON CHRONIC HEART FAILURE WITH PRESERVED EJECTION FRACTION: ICD-10-CM

## 2022-10-19 LAB
ANION GAP SERPL CALC-SCNC: 11 MMOL/L (ref 8–16)
BNP SERPL-MCNC: 195 PG/ML (ref 0–99)
BUN SERPL-MCNC: 40 MG/DL (ref 8–23)
CALCIUM SERPL-MCNC: 9.6 MG/DL (ref 8.7–10.5)
CHLORIDE SERPL-SCNC: 102 MMOL/L (ref 95–110)
CO2 SERPL-SCNC: 27 MMOL/L (ref 23–29)
CREAT SERPL-MCNC: 2.1 MG/DL (ref 0.5–1.4)
EST. GFR  (NO RACE VARIABLE): 22.1 ML/MIN/1.73 M^2
GLUCOSE SERPL-MCNC: 178 MG/DL (ref 70–110)
MAGNESIUM SERPL-MCNC: 2 MG/DL (ref 1.6–2.6)
POTASSIUM SERPL-SCNC: 3.5 MMOL/L (ref 3.5–5.1)
SODIUM SERPL-SCNC: 140 MMOL/L (ref 136–145)

## 2022-10-19 PROCEDURE — 83880 ASSAY OF NATRIURETIC PEPTIDE: CPT | Mod: HCNC | Performed by: INTERNAL MEDICINE

## 2022-10-19 PROCEDURE — 80048 BASIC METABOLIC PNL TOTAL CA: CPT | Mod: HCNC | Performed by: INTERNAL MEDICINE

## 2022-10-19 PROCEDURE — 83735 ASSAY OF MAGNESIUM: CPT | Mod: HCNC | Performed by: INTERNAL MEDICINE

## 2022-10-19 PROCEDURE — 36415 COLL VENOUS BLD VENIPUNCTURE: CPT | Mod: HCNC | Performed by: INTERNAL MEDICINE

## 2022-10-19 RX ORDER — FUROSEMIDE 40 MG/1
40 TABLET ORAL DAILY PRN
Qty: 180 TABLET | Refills: 1 | Status: SHIPPED | OUTPATIENT
Start: 2022-10-19 | End: 2022-10-25 | Stop reason: SDUPTHER

## 2022-10-19 NOTE — TELEPHONE ENCOUNTER
The patient has been notified of this information and all questions answered.    ----- Message from Trevon Ramirez MD sent at 10/19/2022  5:07 PM CDT -----  Please contact the patient and let them know that their results of BNP has improved very well due to less fluid, at this point do not take lasix daily but only if needed for worsening breathing/fluid build up as kidney function seems borderline dry, monitor BP and weights daily and continue low salt diet.    Transition of care  Note: 
 
RUR 29%-pt is high risk for future readmission Plan: 1. Today @  2 pm,the plan is for Tonirom Quinterotammie to come to the ICU and meet with attending,palliative medicine physician and treatment team. 
 
Meredith Alston

## 2022-10-25 ENCOUNTER — OFFICE VISIT (OUTPATIENT)
Dept: CARDIOLOGY | Facility: CLINIC | Age: 87
End: 2022-10-25
Payer: COMMERCIAL

## 2022-10-25 ENCOUNTER — LAB VISIT (OUTPATIENT)
Dept: LAB | Facility: HOSPITAL | Age: 87
End: 2022-10-25
Attending: INTERNAL MEDICINE
Payer: MEDICARE

## 2022-10-25 ENCOUNTER — TELEPHONE (OUTPATIENT)
Dept: CARDIOLOGY | Facility: CLINIC | Age: 87
End: 2022-10-25
Payer: MEDICARE

## 2022-10-25 ENCOUNTER — OFFICE VISIT (OUTPATIENT)
Dept: OPHTHALMOLOGY | Facility: CLINIC | Age: 87
End: 2022-10-25
Payer: COMMERCIAL

## 2022-10-25 VITALS
SYSTOLIC BLOOD PRESSURE: 140 MMHG | BODY MASS INDEX: 31.73 KG/M2 | OXYGEN SATURATION: 94 % | HEIGHT: 64 IN | DIASTOLIC BLOOD PRESSURE: 80 MMHG | WEIGHT: 185.88 LBS | HEART RATE: 90 BPM

## 2022-10-25 DIAGNOSIS — I12.9 CKD STAGE 4 SECONDARY TO HYPERTENSION: ICD-10-CM

## 2022-10-25 DIAGNOSIS — I48.91 ATRIAL FIBRILLATION WITH RVR: ICD-10-CM

## 2022-10-25 DIAGNOSIS — I49.3 PVC'S (PREMATURE VENTRICULAR CONTRACTIONS): ICD-10-CM

## 2022-10-25 DIAGNOSIS — I48.92 ATRIAL FLUTTER WITH RAPID VENTRICULAR RESPONSE: ICD-10-CM

## 2022-10-25 DIAGNOSIS — I48.0 PAROXYSMAL ATRIAL FIBRILLATION: ICD-10-CM

## 2022-10-25 DIAGNOSIS — I48.0 PAF (PAROXYSMAL ATRIAL FIBRILLATION): ICD-10-CM

## 2022-10-25 DIAGNOSIS — Z94.7 HISTORY OF CORNEA TRANSPLANT: ICD-10-CM

## 2022-10-25 DIAGNOSIS — I65.23 CAROTID ARTERY CALCIFICATION, BILATERAL: ICD-10-CM

## 2022-10-25 DIAGNOSIS — I51.3 THROMBUS OF LEFT ATRIAL APPENDAGE: ICD-10-CM

## 2022-10-25 DIAGNOSIS — I65.29 STENOSIS OF CAROTID ARTERY, UNSPECIFIED LATERALITY: ICD-10-CM

## 2022-10-25 DIAGNOSIS — N18.4 CKD STAGE 4 SECONDARY TO HYPERTENSION: ICD-10-CM

## 2022-10-25 DIAGNOSIS — E78.2 MIXED HYPERLIPIDEMIA: ICD-10-CM

## 2022-10-25 DIAGNOSIS — I48.3 TYPICAL ATRIAL FLUTTER: ICD-10-CM

## 2022-10-25 DIAGNOSIS — I10 PRIMARY HYPERTENSION: ICD-10-CM

## 2022-10-25 DIAGNOSIS — H40.1131 PRIMARY OPEN ANGLE GLAUCOMA (POAG) OF BOTH EYES, MILD STAGE: Primary | ICD-10-CM

## 2022-10-25 DIAGNOSIS — I11.9 HYPERTENSIVE LEFT VENTRICULAR HYPERTROPHY, WITHOUT HEART FAILURE: ICD-10-CM

## 2022-10-25 DIAGNOSIS — I50.33 ACUTE ON CHRONIC HEART FAILURE WITH PRESERVED EJECTION FRACTION: Primary | ICD-10-CM

## 2022-10-25 DIAGNOSIS — M35.01 KERATITIS SICCA, BILATERAL: ICD-10-CM

## 2022-10-25 DIAGNOSIS — I73.9 PAD (PERIPHERAL ARTERY DISEASE): ICD-10-CM

## 2022-10-25 DIAGNOSIS — Z96.1 PSEUDOPHAKIA: ICD-10-CM

## 2022-10-25 DIAGNOSIS — I70.0 CALCIFICATION OF AORTA: ICD-10-CM

## 2022-10-25 PROCEDURE — 1125F AMNT PAIN NOTED PAIN PRSNT: CPT | Mod: HCNC,CPTII,S$GLB, | Performed by: INTERNAL MEDICINE

## 2022-10-25 PROCEDURE — 1159F PR MEDICATION LIST DOCUMENTED IN MEDICAL RECORD: ICD-10-PCS | Mod: HCNC,CPTII,S$GLB, | Performed by: INTERNAL MEDICINE

## 2022-10-25 PROCEDURE — 1160F RVW MEDS BY RX/DR IN RCRD: CPT | Mod: HCNC,CPTII,S$GLB, | Performed by: OPHTHALMOLOGY

## 2022-10-25 PROCEDURE — 99999 PR PBB SHADOW E&M-EST. PATIENT-LVL V: ICD-10-PCS | Mod: PBBFAC,HCNC,, | Performed by: INTERNAL MEDICINE

## 2022-10-25 PROCEDURE — 99214 PR OFFICE/OUTPT VISIT, EST, LEVL IV, 30-39 MIN: ICD-10-PCS | Mod: HCNC,S$GLB,, | Performed by: INTERNAL MEDICINE

## 2022-10-25 PROCEDURE — 80048 BASIC METABOLIC PNL TOTAL CA: CPT | Mod: HCNC | Performed by: INTERNAL MEDICINE

## 2022-10-25 PROCEDURE — 85730 THROMBOPLASTIN TIME PARTIAL: CPT | Mod: HCNC | Performed by: INTERNAL MEDICINE

## 2022-10-25 PROCEDURE — 1160F RVW MEDS BY RX/DR IN RCRD: CPT | Mod: HCNC,CPTII,S$GLB, | Performed by: INTERNAL MEDICINE

## 2022-10-25 PROCEDURE — 85610 PROTHROMBIN TIME: CPT | Mod: HCNC | Performed by: INTERNAL MEDICINE

## 2022-10-25 PROCEDURE — 99214 OFFICE O/P EST MOD 30 MIN: CPT | Mod: HCNC,S$GLB,, | Performed by: OPHTHALMOLOGY

## 2022-10-25 PROCEDURE — 99214 OFFICE O/P EST MOD 30 MIN: CPT | Mod: HCNC,S$GLB,, | Performed by: INTERNAL MEDICINE

## 2022-10-25 PROCEDURE — 99999 PR PBB SHADOW E&M-EST. PATIENT-LVL II: ICD-10-PCS | Mod: PBBFAC,HCNC,, | Performed by: OPHTHALMOLOGY

## 2022-10-25 PROCEDURE — 92133 POSTERIOR SEGMENT OCT OPTIC NERVE(OCULAR COHERENCE TOMOGRAPHY) - OU - BOTH EYES: ICD-10-PCS | Mod: HCNC,S$GLB,, | Performed by: OPHTHALMOLOGY

## 2022-10-25 PROCEDURE — 1125F PR PAIN SEVERITY QUANTIFIED, PAIN PRESENT: ICD-10-PCS | Mod: HCNC,CPTII,S$GLB, | Performed by: INTERNAL MEDICINE

## 2022-10-25 PROCEDURE — 1159F MED LIST DOCD IN RCRD: CPT | Mod: HCNC,CPTII,S$GLB, | Performed by: OPHTHALMOLOGY

## 2022-10-25 PROCEDURE — 1160F PR REVIEW ALL MEDS BY PRESCRIBER/CLIN PHARMACIST DOCUMENTED: ICD-10-PCS | Mod: HCNC,CPTII,S$GLB, | Performed by: OPHTHALMOLOGY

## 2022-10-25 PROCEDURE — 1160F PR REVIEW ALL MEDS BY PRESCRIBER/CLIN PHARMACIST DOCUMENTED: ICD-10-PCS | Mod: HCNC,CPTII,S$GLB, | Performed by: INTERNAL MEDICINE

## 2022-10-25 PROCEDURE — 36415 COLL VENOUS BLD VENIPUNCTURE: CPT | Mod: HCNC | Performed by: INTERNAL MEDICINE

## 2022-10-25 PROCEDURE — 99999 PR PBB SHADOW E&M-EST. PATIENT-LVL V: CPT | Mod: PBBFAC,HCNC,, | Performed by: INTERNAL MEDICINE

## 2022-10-25 PROCEDURE — 99999 PR PBB SHADOW E&M-EST. PATIENT-LVL II: CPT | Mod: PBBFAC,HCNC,, | Performed by: OPHTHALMOLOGY

## 2022-10-25 PROCEDURE — 99214 PR OFFICE/OUTPT VISIT, EST, LEVL IV, 30-39 MIN: ICD-10-PCS | Mod: HCNC,S$GLB,, | Performed by: OPHTHALMOLOGY

## 2022-10-25 PROCEDURE — 85027 COMPLETE CBC AUTOMATED: CPT | Mod: HCNC | Performed by: INTERNAL MEDICINE

## 2022-10-25 PROCEDURE — 1159F PR MEDICATION LIST DOCUMENTED IN MEDICAL RECORD: ICD-10-PCS | Mod: HCNC,CPTII,S$GLB, | Performed by: OPHTHALMOLOGY

## 2022-10-25 PROCEDURE — 1159F MED LIST DOCD IN RCRD: CPT | Mod: HCNC,CPTII,S$GLB, | Performed by: INTERNAL MEDICINE

## 2022-10-25 PROCEDURE — 92133 CPTRZD OPH DX IMG PST SGM ON: CPT | Mod: HCNC,S$GLB,, | Performed by: OPHTHALMOLOGY

## 2022-10-25 RX ORDER — TRAVOPROST OPHTHALMIC SOLUTION 0.04 MG/ML
1 SOLUTION OPHTHALMIC NIGHTLY
Qty: 2.5 ML | Refills: 12 | Status: SHIPPED | OUTPATIENT
Start: 2022-10-25 | End: 2022-11-29 | Stop reason: SDUPTHER

## 2022-10-25 RX ORDER — FUROSEMIDE 40 MG/1
40 TABLET ORAL DAILY
Qty: 180 TABLET | Refills: 1 | Status: SHIPPED | OUTPATIENT
Start: 2022-10-25 | End: 2022-10-25 | Stop reason: SDUPTHER

## 2022-10-25 RX ORDER — FUROSEMIDE 40 MG/1
40 TABLET ORAL DAILY
Qty: 180 TABLET | Refills: 1 | Status: ON HOLD
Start: 2022-10-25 | End: 2023-01-12 | Stop reason: SDUPTHER

## 2022-10-25 RX ORDER — TIMOLOL MALEATE 5 MG/ML
1 SOLUTION/ DROPS OPHTHALMIC EVERY MORNING
Qty: 5 ML | Refills: 12 | Status: SHIPPED | OUTPATIENT
Start: 2022-10-25 | End: 2022-11-10

## 2022-10-25 NOTE — PROGRESS NOTES
SUBJECTIVE  AdrianaIfrah Metz is 89 y.o. female  Corrected distance visual acuity was 20/30 in the right eye and 20/30 in the left eye.   Chief Complaint   Patient presents with    Glaucoma     IOP and GOCT           HPI     Glaucoma     Additional comments: IOP and GOCT            Comments    States that her vision seems stable but she's concerned since she's taking   Amiodarone for afib that  she might have some changes. Compliant with gtts   as directed. States that they changed her bottle for travatan and its   harder to squeeze out.    1. Mild COAG Goal < 19   SLT OD 3/04 (20 to 15) + 3/11 (19 to 15)   SLT OS 5/05 (20 to 14) +5/11 (18-19 to 15) + 3/12 (min resp.) + 3/11/15   (20.5-17.5) + 3/7/18 (24- 21) + 11/3/21 (22.5-17.5)  (Cosopt, Xalatan, and Timolol cause Myalgias)   (Alphagan causes redness) (zioptan too expensive)   Possible steroid responder   2. PCIOL OU   3. Mild Dry AMD   4. Fuch's Dystrophy   DSAEK OD 4/16 Dr. Quintanilla (followed by Dwight every summer)   DSAEK OS 8/15 Dr. Quintanilla   Rx Inveltys (1% lotemax) (IOP 15/23) 5/29/20   Dr. Quintanilla Ok'd to stop Lotemax OU  5. Dry Eye -intolerance to Restasis   6. Recent A-Fib. (from Labetolol ?)       Systane Ultra 4-5 tiimes daily   Travatan Z qhs os   Rhopressa qhs OS     +HAG            Last edited by France Jason on 10/25/2022  1:28 PM.         Assessment /Plan :  1. Primary open angle glaucoma (POAG) of both eyes, mild stage     Doing well, intraocular pressure (IOP) OD within acceptable range relative to target IOP and no evidence of progression. Continue current treatment. Reviewed importance of continued compliance with treatment and follow up.    Intraocular pressure (IOP) OS not within acceptable range relative to target IOP with risk of irreversible visual loss. Better IOP control is recommended. Treatment options may include change or additional medications, Selective Laser Trabeculoplasty (SLT laser), and/or incisional glaucoma  surgery. Reviewed importance of continued compliance with treatment and follow up.     Patient chooses  to add Timolol one drop in the left eye every morning.   Continue Travatan Z one drop in the left eye every night and Rhopressa one drop in the left eye every night.    Return to clinic in  5 weeks   or as needed.  With IOP Check         2. Keratitis sicca, bilateral  -- Condition stable, no therapeutic change required. Monitoring routinely.     3. Pseudophakia  -- Condition stable, no therapeutic change required. Monitoring routinely.     4. History of cornea transplant
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
## 2022-10-25 NOTE — TELEPHONE ENCOUNTER
I called the patient for symptom check.     I spoke to the patient.     She has recently stopped lasix and was told to call if she did not get better. She says her symptoms have gotten worse and she had continue the Lasix 40 mg in the morning.     180 lb 124/72 68    Please advise.     She says she has an appointment at [facility] today if she needs to be seen.

## 2022-10-25 NOTE — TELEPHONE ENCOUNTER
----- Message from Celine Stanford sent at 10/25/2022  8:15 AM CDT -----  Contact: Shirley Rg stated she is having swelling in both ankles, shortness of breath, and abdominal swelling. She stated she is currently taking 40mg of Lasix in the morning. Please call her back at [phone].

## 2022-10-26 LAB
ANION GAP SERPL CALC-SCNC: 11 MMOL/L (ref 8–16)
APTT BLDCRRT: 30.2 SEC (ref 21–32)
BUN SERPL-MCNC: 36 MG/DL (ref 8–23)
CALCIUM SERPL-MCNC: 9.3 MG/DL (ref 8.7–10.5)
CHLORIDE SERPL-SCNC: 104 MMOL/L (ref 95–110)
CO2 SERPL-SCNC: 23 MMOL/L (ref 23–29)
CREAT SERPL-MCNC: 1.8 MG/DL (ref 0.5–1.4)
ERYTHROCYTE [DISTWIDTH] IN BLOOD BY AUTOMATED COUNT: 15.5 % (ref 11.5–14.5)
EST. GFR  (NO RACE VARIABLE): 26.6 ML/MIN/1.73 M^2
GLUCOSE SERPL-MCNC: 116 MG/DL (ref 70–110)
HCT VFR BLD AUTO: 37.7 % (ref 37–48.5)
HGB BLD-MCNC: 11.9 G/DL (ref 12–16)
INR PPP: 1.1 (ref 0.8–1.2)
MCH RBC QN AUTO: 32.1 PG (ref 27–31)
MCHC RBC AUTO-ENTMCNC: 31.6 G/DL (ref 32–36)
MCV RBC AUTO: 102 FL (ref 82–98)
PLATELET # BLD AUTO: 237 K/UL (ref 150–450)
PMV BLD AUTO: 10.4 FL (ref 9.2–12.9)
POTASSIUM SERPL-SCNC: 4 MMOL/L (ref 3.5–5.1)
PROTHROMBIN TIME: 11.7 SEC (ref 9–12.5)
RBC # BLD AUTO: 3.71 M/UL (ref 4–5.4)
SODIUM SERPL-SCNC: 138 MMOL/L (ref 136–145)
WBC # BLD AUTO: 7.11 K/UL (ref 3.9–12.7)

## 2022-10-27 ENCOUNTER — TELEPHONE (OUTPATIENT)
Dept: ELECTROPHYSIOLOGY | Facility: CLINIC | Age: 87
End: 2022-10-27
Payer: MEDICARE

## 2022-10-27 NOTE — TELEPHONE ENCOUNTER
I spoke with Ms. Rg and let her know that the last time I called her, we actually spoke. She said that someone called her and didn't leave a message so she assumed it was us. We discussed her upcoming procedure and I answered her questions. She verbalized understanding and appreciated the call.

## 2022-10-27 NOTE — TELEPHONE ENCOUNTER
----- Message from Farideh Ramos MA sent at 10/26/2022 12:31 PM CDT -----  Regarding: FW: Returning call    ----- Message -----  From: Kassandra Friedman  Sent: 10/26/2022  10:01 AM CDT  To: [facility] Staff  Subject: Returning call                                   PT is returning a call to [place].  Pt can be reached @ [phone]      Thanks

## 2022-11-01 ENCOUNTER — TELEPHONE (OUTPATIENT)
Dept: ELECTROPHYSIOLOGY | Facility: CLINIC | Age: 87
End: 2022-11-01
Payer: MEDICARE

## 2022-11-01 NOTE — TELEPHONE ENCOUNTER
Spoke to Ms. Rg    CONFIRMED procedure arrival time of 0900    Reiterated instructions including:  -Directions to check in desk  -NPO after midnight night prior to procedure  -High importance of HOLDING Eliquis am of procedure, Amiodarone and Diltiazem after 10/29  -Pre-procedure LABS done, no alerts  -Confirmed absence or presence of implanted device/stimulator, patient confirms, none present.   -Confirmed no fever, cough, or shortness of breath in the past 30 days  -Confirmed no redness, rash, irritation, or yeast infection to groin area.     Patient verbalized understanding of above and appreciated the call.

## 2022-11-03 ENCOUNTER — ANESTHESIA EVENT (OUTPATIENT)
Dept: MEDSURG UNIT | Facility: HOSPITAL | Age: 87
End: 2022-11-03
Payer: MEDICARE

## 2022-11-03 ENCOUNTER — ANESTHESIA (OUTPATIENT)
Dept: MEDSURG UNIT | Facility: HOSPITAL | Age: 87
End: 2022-11-03
Payer: MEDICARE

## 2022-11-03 ENCOUNTER — HOSPITAL ENCOUNTER (OUTPATIENT)
Facility: HOSPITAL | Age: 87
Discharge: HOME | End: 2022-11-04
Attending: INTERNAL MEDICINE | Admitting: INTERNAL MEDICINE
Payer: MEDICARE

## 2022-11-03 ENCOUNTER — HOSPITAL ENCOUNTER (OUTPATIENT)
Dept: CARDIOLOGY | Facility: HOSPITAL | Age: 87
Discharge: HOME | End: 2022-11-03
Attending: INTERNAL MEDICINE | Admitting: INTERNAL MEDICINE
Payer: MEDICARE

## 2022-11-03 DIAGNOSIS — I48.3 TYPICAL ATRIAL FLUTTER: ICD-10-CM

## 2022-11-03 DIAGNOSIS — I48.0 PAROXYSMAL ATRIAL FIBRILLATION: ICD-10-CM

## 2022-11-03 DIAGNOSIS — Z98.890 S/P ABLATION OF ATRIAL FIBRILLATION: ICD-10-CM

## 2022-11-03 DIAGNOSIS — I48.91 ATRIAL FIBRILLATION: ICD-10-CM

## 2022-11-03 DIAGNOSIS — Z86.79 S/P ABLATION OF ATRIAL FIBRILLATION: ICD-10-CM

## 2022-11-03 DIAGNOSIS — I49.9 ARRHYTHMIA: ICD-10-CM

## 2022-11-03 LAB
BSA FOR ECHO PROCEDURE: 1.92 M2
EJECTION FRACTION: 55 %
POCT GLUCOSE: 119 MG/DL (ref 70–110)
POCT GLUCOSE: 121 MG/DL (ref 70–110)
POCT GLUCOSE: 175 MG/DL (ref 70–110)
PROX AORTA: 3.6 CM
SINUS: 3.5 CM
STJ: 2.6 CM

## 2022-11-03 PROCEDURE — 93010 ELECTROCARDIOGRAM REPORT: CPT | Mod: HCNC,,, | Performed by: INTERNAL MEDICINE

## 2022-11-03 PROCEDURE — 63600175 PHARM REV CODE 636 W HCPCS: Mod: HCNC | Performed by: NURSE ANESTHETIST, CERTIFIED REGISTERED

## 2022-11-03 PROCEDURE — 92960 CARDIOVERSION ELECTRIC EXT: CPT | Mod: 59,,, | Performed by: INTERNAL MEDICINE

## 2022-11-03 PROCEDURE — 82962 GLUCOSE BLOOD TEST: CPT | Mod: HCNC | Performed by: INTERNAL MEDICINE

## 2022-11-03 PROCEDURE — 93320 TRANSESOPHAGEAL ECHO (TEE) (CUPID ONLY): ICD-10-PCS | Mod: 26,HCNC,, | Performed by: INTERNAL MEDICINE

## 2022-11-03 PROCEDURE — C1766 INTRO/SHEATH,STRBLE,NON-PEEL: HCPCS | Mod: HCNC | Performed by: INTERNAL MEDICINE

## 2022-11-03 PROCEDURE — 93005 ELECTROCARDIOGRAM TRACING: CPT | Mod: HCNC

## 2022-11-03 PROCEDURE — 92960 PR CARDIOVERSION, ELECTIVE;EXTERN: ICD-10-PCS | Mod: 59,,, | Performed by: INTERNAL MEDICINE

## 2022-11-03 PROCEDURE — 27201037 HC PRESSURE MONITORING SET UP: Mod: HCNC

## 2022-11-03 PROCEDURE — 93010 EKG 12-LEAD: ICD-10-PCS | Mod: HCNC,,, | Performed by: INTERNAL MEDICINE

## 2022-11-03 PROCEDURE — 93656 COMPRE EP EVAL ABLTJ ATR FIB: CPT | Mod: HCNC,,, | Performed by: INTERNAL MEDICINE

## 2022-11-03 PROCEDURE — C1887 CATHETER, GUIDING: HCPCS | Mod: HCNC | Performed by: INTERNAL MEDICINE

## 2022-11-03 PROCEDURE — 93656 COMPRE EP EVAL ABLTJ ATR FIB: CPT | Performed by: INTERNAL MEDICINE

## 2022-11-03 PROCEDURE — 93655 PR ABLATE ARRHYTHMIA ADD ON: ICD-10-PCS | Mod: HCNC,,, | Performed by: INTERNAL MEDICINE

## 2022-11-03 PROCEDURE — 37000009 HC ANESTHESIA EA ADD 15 MINS: Mod: HCNC | Performed by: INTERNAL MEDICINE

## 2022-11-03 PROCEDURE — 36620 PR INSERT CATH,ART,PERCUT,SHORTTERM: ICD-10-PCS | Mod: 59,HCNC,, | Performed by: ANESTHESIOLOGY

## 2022-11-03 PROCEDURE — 93655 ICAR CATH ABLTJ DSCRT ARRHYT: CPT | Performed by: INTERNAL MEDICINE

## 2022-11-03 PROCEDURE — C1730 CATH, EP, 19 OR FEW ELECT: HCPCS | Mod: HCNC | Performed by: INTERNAL MEDICINE

## 2022-11-03 PROCEDURE — 37000008 HC ANESTHESIA 1ST 15 MINUTES: Mod: HCNC | Performed by: INTERNAL MEDICINE

## 2022-11-03 PROCEDURE — D9220A PRA ANESTHESIA: ICD-10-PCS | Mod: HCNC,CRNA,, | Performed by: NURSE ANESTHETIST, CERTIFIED REGISTERED

## 2022-11-03 PROCEDURE — 25000003 PHARM REV CODE 250: Mod: HCNC | Performed by: INTERNAL MEDICINE

## 2022-11-03 PROCEDURE — C2630 CATH, EP, COOL-TIP: HCPCS | Mod: HCNC | Performed by: INTERNAL MEDICINE

## 2022-11-03 PROCEDURE — 93325 TRANSESOPHAGEAL ECHO (TEE) (CUPID ONLY): ICD-10-PCS | Mod: 26,HCNC,, | Performed by: INTERNAL MEDICINE

## 2022-11-03 PROCEDURE — C1753 CATH, INTRAVAS ULTRASOUND: HCPCS | Mod: HCNC | Performed by: INTERNAL MEDICINE

## 2022-11-03 PROCEDURE — 93312 ECHO TRANSESOPHAGEAL: CPT | Mod: 26,HCNC,, | Performed by: INTERNAL MEDICINE

## 2022-11-03 PROCEDURE — 25000003 PHARM REV CODE 250: Mod: HCNC | Performed by: NURSE ANESTHETIST, CERTIFIED REGISTERED

## 2022-11-03 PROCEDURE — 92960 CARDIOVERSION ELECTRIC EXT: CPT | Mod: 59 | Performed by: INTERNAL MEDICINE

## 2022-11-03 PROCEDURE — 93325 DOPPLER ECHO COLOR FLOW MAPG: CPT | Mod: 26,HCNC,, | Performed by: INTERNAL MEDICINE

## 2022-11-03 PROCEDURE — D9220A PRA ANESTHESIA: Mod: HCNC,CRNA,, | Performed by: NURSE ANESTHETIST, CERTIFIED REGISTERED

## 2022-11-03 PROCEDURE — D9220A PRA ANESTHESIA: ICD-10-PCS | Mod: HCNC,ANES,, | Performed by: ANESTHESIOLOGY

## 2022-11-03 PROCEDURE — D9220A PRA ANESTHESIA: Mod: HCNC,ANES,, | Performed by: ANESTHESIOLOGY

## 2022-11-03 PROCEDURE — 93655 ICAR CATH ABLTJ DSCRT ARRHYT: CPT | Mod: HCNC,,, | Performed by: INTERNAL MEDICINE

## 2022-11-03 PROCEDURE — 93320 DOPPLER ECHO COMPLETE: CPT | Mod: 26,HCNC,, | Performed by: INTERNAL MEDICINE

## 2022-11-03 PROCEDURE — 93656 PR ELECTROPHYS EVAL, COMPREHEN, W/ABLATION OF ATRIAL FIBR: ICD-10-PCS | Mod: HCNC,,, | Performed by: INTERNAL MEDICINE

## 2022-11-03 PROCEDURE — 63600175 PHARM REV CODE 636 W HCPCS: Mod: HCNC | Performed by: INTERNAL MEDICINE

## 2022-11-03 PROCEDURE — C1894 INTRO/SHEATH, NON-LASER: HCPCS | Mod: HCNC | Performed by: INTERNAL MEDICINE

## 2022-11-03 PROCEDURE — 27201423 OPTIME MED/SURG SUP & DEVICES STERILE SUPPLY: Mod: HCNC | Performed by: INTERNAL MEDICINE

## 2022-11-03 PROCEDURE — 93325 DOPPLER ECHO COLOR FLOW MAPG: CPT | Mod: HCNC

## 2022-11-03 PROCEDURE — 36620 INSERTION CATHETER ARTERY: CPT | Mod: 59,HCNC,, | Performed by: ANESTHESIOLOGY

## 2022-11-03 PROCEDURE — 93312 TRANSESOPHAGEAL ECHO (TEE) (CUPID ONLY): ICD-10-PCS | Mod: 26,HCNC,, | Performed by: INTERNAL MEDICINE

## 2022-11-03 RX ORDER — HEPARIN SODIUM 1000 [USP'U]/ML
INJECTION, SOLUTION INTRAVENOUS; SUBCUTANEOUS
Status: DISCONTINUED | OUTPATIENT
Start: 2022-11-03 | End: 2022-11-03

## 2022-11-03 RX ORDER — HEPARIN SODIUM 10000 [USP'U]/100ML
INJECTION, SOLUTION INTRAVENOUS CONTINUOUS PRN
Status: DISCONTINUED | OUTPATIENT
Start: 2022-11-03 | End: 2022-11-03

## 2022-11-03 RX ORDER — ROCURONIUM BROMIDE 10 MG/ML
INJECTION, SOLUTION INTRAVENOUS
Status: DISCONTINUED | OUTPATIENT
Start: 2022-11-03 | End: 2022-11-03

## 2022-11-03 RX ORDER — ONDANSETRON 2 MG/ML
INJECTION INTRAMUSCULAR; INTRAVENOUS
Status: DISCONTINUED | OUTPATIENT
Start: 2022-11-03 | End: 2022-11-03

## 2022-11-03 RX ORDER — ALPRAZOLAM 0.25 MG/1
0.25 TABLET ORAL NIGHTLY PRN
Status: DISCONTINUED | OUTPATIENT
Start: 2022-11-03 | End: 2022-11-04 | Stop reason: HOSPADM

## 2022-11-03 RX ORDER — DILTIAZEM HYDROCHLORIDE 60 MG/1
60 TABLET, FILM COATED ORAL 2 TIMES DAILY
Status: DISCONTINUED | OUTPATIENT
Start: 2022-11-04 | End: 2022-11-03

## 2022-11-03 RX ORDER — TIMOLOL MALEATE 5 MG/ML
1 SOLUTION/ DROPS OPHTHALMIC EVERY MORNING
Status: DISCONTINUED | OUTPATIENT
Start: 2022-11-03 | End: 2022-11-04 | Stop reason: HOSPADM

## 2022-11-03 RX ORDER — HEPARIN SOD,PORCINE/0.9 % NACL 1000/500ML
INTRAVENOUS SOLUTION INTRAVENOUS
Status: DISCONTINUED | OUTPATIENT
Start: 2022-11-03 | End: 2022-11-04 | Stop reason: HOSPADM

## 2022-11-03 RX ORDER — GLUCAGON 1 MG
1 KIT INJECTION
Status: DISCONTINUED | OUTPATIENT
Start: 2022-11-03 | End: 2022-11-04 | Stop reason: HOSPADM

## 2022-11-03 RX ORDER — NEOSTIGMINE METHYLSULFATE 0.5 MG/ML
INJECTION, SOLUTION INTRAVENOUS
Status: DISCONTINUED | OUTPATIENT
Start: 2022-11-03 | End: 2022-11-03

## 2022-11-03 RX ORDER — PROPOFOL 10 MG/ML
VIAL (ML) INTRAVENOUS
Status: DISCONTINUED | OUTPATIENT
Start: 2022-11-03 | End: 2022-11-03

## 2022-11-03 RX ORDER — METOPROLOL TARTRATE 25 MG/1
25 TABLET, FILM COATED ORAL 2 TIMES DAILY
Status: DISCONTINUED | OUTPATIENT
Start: 2022-11-04 | End: 2022-11-03

## 2022-11-03 RX ORDER — TRAVOPROST OPHTHALMIC SOLUTION 0.04 MG/ML
1 SOLUTION OPHTHALMIC NIGHTLY
Status: DISCONTINUED | OUTPATIENT
Start: 2022-11-03 | End: 2022-11-04 | Stop reason: HOSPADM

## 2022-11-03 RX ORDER — INSULIN ASPART 100 [IU]/ML
0-5 INJECTION, SOLUTION INTRAVENOUS; SUBCUTANEOUS
Status: DISCONTINUED | OUTPATIENT
Start: 2022-11-03 | End: 2022-11-04 | Stop reason: HOSPADM

## 2022-11-03 RX ORDER — PHENYLEPHRINE HYDROCHLORIDE 10 MG/ML
INJECTION INTRAVENOUS CONTINUOUS PRN
Status: DISCONTINUED | OUTPATIENT
Start: 2022-11-03 | End: 2022-11-03

## 2022-11-03 RX ORDER — VANCOMYCIN HCL IN 5 % DEXTROSE 1G/250ML
1000 PLASTIC BAG, INJECTION (ML) INTRAVENOUS
Status: DISCONTINUED | OUTPATIENT
Start: 2022-11-03 | End: 2024-02-02

## 2022-11-03 RX ORDER — SODIUM CHLORIDE 0.9 % (FLUSH) 0.9 %
10 SYRINGE (ML) INJECTION
Status: DISCONTINUED | OUTPATIENT
Start: 2022-11-03 | End: 2022-11-04 | Stop reason: HOSPADM

## 2022-11-03 RX ORDER — DEXAMETHASONE SODIUM PHOSPHATE 4 MG/ML
INJECTION, SOLUTION INTRA-ARTICULAR; INTRALESIONAL; INTRAMUSCULAR; INTRAVENOUS; SOFT TISSUE
Status: DISCONTINUED | OUTPATIENT
Start: 2022-11-03 | End: 2022-11-03

## 2022-11-03 RX ORDER — PROCHLORPERAZINE EDISYLATE 5 MG/ML
5 INJECTION INTRAMUSCULAR; INTRAVENOUS EVERY 30 MIN PRN
Status: DISCONTINUED | OUTPATIENT
Start: 2022-11-03 | End: 2022-11-04 | Stop reason: HOSPADM

## 2022-11-03 RX ORDER — ACETAMINOPHEN 325 MG/1
650 TABLET ORAL EVERY 4 HOURS PRN
Status: DISCONTINUED | OUTPATIENT
Start: 2022-11-03 | End: 2022-11-04 | Stop reason: HOSPADM

## 2022-11-03 RX ORDER — FENTANYL CITRATE 50 UG/ML
INJECTION, SOLUTION INTRAMUSCULAR; INTRAVENOUS
Status: DISCONTINUED | OUTPATIENT
Start: 2022-11-03 | End: 2022-11-03

## 2022-11-03 RX ORDER — PROTAMINE SULFATE 10 MG/ML
INJECTION, SOLUTION INTRAVENOUS
Status: DISCONTINUED | OUTPATIENT
Start: 2022-11-03 | End: 2022-11-03

## 2022-11-03 RX ORDER — FUROSEMIDE 10 MG/ML
INJECTION INTRAMUSCULAR; INTRAVENOUS
Status: DISCONTINUED | OUTPATIENT
Start: 2022-11-03 | End: 2022-11-03

## 2022-11-03 RX ORDER — AMIODARONE HYDROCHLORIDE 200 MG/1
200 TABLET ORAL DAILY
Status: DISCONTINUED | OUTPATIENT
Start: 2022-11-04 | End: 2022-11-03

## 2022-11-03 RX ORDER — PROPOFOL 10 MG/ML
VIAL (ML) INTRAVENOUS CONTINUOUS PRN
Status: DISCONTINUED | OUTPATIENT
Start: 2022-11-03 | End: 2022-11-03

## 2022-11-03 RX ORDER — IBUPROFEN 200 MG
24 TABLET ORAL
Status: DISCONTINUED | OUTPATIENT
Start: 2022-11-03 | End: 2022-11-04 | Stop reason: HOSPADM

## 2022-11-03 RX ORDER — LIDOCAINE HYDROCHLORIDE 20 MG/ML
INJECTION, SOLUTION EPIDURAL; INFILTRATION; INTRACAUDAL; PERINEURAL
Status: DISCONTINUED | OUTPATIENT
Start: 2022-11-03 | End: 2022-11-03

## 2022-11-03 RX ORDER — IBUPROFEN 200 MG
16 TABLET ORAL
Status: DISCONTINUED | OUTPATIENT
Start: 2022-11-03 | End: 2022-11-04 | Stop reason: HOSPADM

## 2022-11-03 RX ORDER — FENTANYL CITRATE 50 UG/ML
25 INJECTION, SOLUTION INTRAMUSCULAR; INTRAVENOUS EVERY 5 MIN PRN
Status: DISCONTINUED | OUTPATIENT
Start: 2022-11-03 | End: 2022-11-04 | Stop reason: HOSPADM

## 2022-11-03 RX ORDER — LIDOCAINE HYDROCHLORIDE 10 MG/ML
INJECTION INFILTRATION; PERINEURAL
Status: DISCONTINUED | OUTPATIENT
Start: 2022-11-03 | End: 2022-11-04 | Stop reason: HOSPADM

## 2022-11-03 RX ADMIN — TIMOLOL MALEATE 1 DROP: 5 SOLUTION OPHTHALMIC at 06:11

## 2022-11-03 RX ADMIN — APIXABAN 2.5 MG: 2.5 TABLET, FILM COATED ORAL at 09:11

## 2022-11-03 RX ADMIN — GLYCOPYRROLATE 0.4 MG: 0.2 INJECTION INTRAMUSCULAR; INTRAVENOUS at 01:11

## 2022-11-03 RX ADMIN — PHENYLEPHRINE HYDROCHLORIDE 0.3 MCG/KG/MIN: 10 INJECTION INTRAVENOUS at 09:11

## 2022-11-03 RX ADMIN — LIDOCAINE HYDROCHLORIDE 100 MG: 20 INJECTION, SOLUTION EPIDURAL; INFILTRATION; INTRACAUDAL; PERINEURAL at 10:11

## 2022-11-03 RX ADMIN — TRAVOPROST 1 DROP: 0.04 SOLUTION/ DROPS OPHTHALMIC at 09:11

## 2022-11-03 RX ADMIN — SODIUM CHLORIDE: 9 INJECTION, SOLUTION INTRAVENOUS at 10:11

## 2022-11-03 RX ADMIN — PROTAMINE SULFATE 80 MG: 10 INJECTION, SOLUTION INTRAVENOUS at 01:11

## 2022-11-03 RX ADMIN — HEPARIN SODIUM 12 UNITS/KG/HR: 10000 INJECTION, SOLUTION INTRAVENOUS at 11:11

## 2022-11-03 RX ADMIN — SUGAMMADEX 200 MG: 100 INJECTION, SOLUTION INTRAVENOUS at 01:11

## 2022-11-03 RX ADMIN — Medication 150 MG: at 10:11

## 2022-11-03 RX ADMIN — FUROSEMIDE 40 MG: 10 INJECTION, SOLUTION INTRAMUSCULAR; INTRAVENOUS at 01:11

## 2022-11-03 RX ADMIN — ROCURONIUM BROMIDE 40 MG: 10 SOLUTION INTRAVENOUS at 10:11

## 2022-11-03 RX ADMIN — DEXAMETHASONE SODIUM PHOSPHATE 8 MG: 4 INJECTION INTRA-ARTICULAR; INTRALESIONAL; INTRAMUSCULAR; INTRAVENOUS; SOFT TISSUE at 10:11

## 2022-11-03 RX ADMIN — ACETAMINOPHEN 650 MG: 325 TABLET ORAL at 02:11

## 2022-11-03 RX ADMIN — ALPRAZOLAM 0.25 MG: 0.25 TABLET ORAL at 09:11

## 2022-11-03 RX ADMIN — FENTANYL CITRATE 100 MCG: 0.05 INJECTION, SOLUTION INTRAMUSCULAR; INTRAVENOUS at 10:11

## 2022-11-03 RX ADMIN — SODIUM CHLORIDE, SODIUM GLUCONATE, SODIUM ACETATE, POTASSIUM CHLORIDE, MAGNESIUM CHLORIDE, SODIUM PHOSPHATE, DIBASIC, AND POTASSIUM PHOSPHATE: .53; .5; .37; .037; .03; .012; .00082 INJECTION, SOLUTION INTRAVENOUS at 10:11

## 2022-11-03 RX ADMIN — ACETAMINOPHEN 650 MG: 325 TABLET ORAL at 09:11

## 2022-11-03 RX ADMIN — NEOSTIGMINE METHYLSULFATE 3 MG: 0.5 INJECTION, SOLUTION INTRAVENOUS at 01:11

## 2022-11-03 RX ADMIN — HEPARIN SODIUM 2000 UNITS: 1000 INJECTION, SOLUTION INTRAVENOUS; SUBCUTANEOUS at 11:11

## 2022-11-03 RX ADMIN — ONDANSETRON 4 MG: 2 INJECTION INTRAMUSCULAR; INTRAVENOUS at 10:11

## 2022-11-03 RX ADMIN — PROPOFOL 150 MCG/KG/MIN: 10 INJECTION, EMULSION INTRAVENOUS at 11:11

## 2022-11-03 RX ADMIN — HEPARIN SODIUM 17000 UNITS: 1000 INJECTION, SOLUTION INTRAVENOUS; SUBCUTANEOUS at 11:11

## 2022-11-03 NOTE — NURSING TRANSFER
Nursing Transfer Note      11/3/2022     Reason patient is being transferred: ep pacu 2 to 300    Transfer To: ep pacu 2 to 300    Transfer via stretcher    Transfer with cardiac monitoring, tele box on confirmed by tele tech, 2l nc portable oxygen tank.     Transported by jose g pacu pct and dmitry sscu pct    Medicines sent: none    Any special needs or follow-up needed: bedrest x6hrs. Suture removal time 1700. Bedrest done 1900.  Reyes to be removed at 1900    Chart send with patient: Yes    Notified: son    Patient reassessed at: 11/3/22 1500. Next due 1530  Upon arrival to floor: cardiac monitor applied, patient oriented to room, call bell in reach, and bed in lowest position, 2l nc wall oxygen.    Walker and belongings bag x2 brought to pt's room 300 by sscu pct dmitry.

## 2022-11-03 NOTE — TRANSFER OF CARE
"Anesthesia Transfer of Care Note    Patient: Shirley Metz    Procedure(s) Performed: Procedure(s) (LRB):  Ablation atrial fibrillation (N/A)  ABLATION, ATRIAL FLUTTER, TYPICAL (N/A)  Transesophageal echo (STACEY) intra-procedure log documentation (N/A)    Patient location: PACU    Anesthesia Type: general    Transport from OR: Transported from OR on 6-10 L/min O2 by face mask with adequate spontaneous ventilation    Post pain: adequate analgesia    Post assessment: no apparent anesthetic complications and tolerated procedure well    Post vital signs: stable    Level of consciousness: awake and responds to stimulation    Nausea/Vomiting: no nausea/vomiting    Complications: none    Transfer of care protocol was followed      Last vitals:   Visit Vitals  BP (!) 146/82   Pulse 88   Temp 37.1 °C (98.8 °F) (Temporal)   Resp 16   Ht 5' 4" (1.626 m)   Wt 81.6 kg (180 lb)   LMP  (LMP Unknown)   SpO2 (!) 93%   Breastfeeding No   BMI 30.90 kg/m²     "

## 2022-11-03 NOTE — HPI
Shirley Metz is a 89 y.o. female pt with HTN, PAF/AFL on Eliquis, PVD, carotid artery disease, HFpEF, preDM, hyperlipidemia, and CKD. Pt has been in symptomatic AFL and AF since July of this year. Event monitor showed 100% AF. She is following with Dr. Kelly and presents today for STACEY and RF PVI.      TTE 7/12/22  Concentric remodeling and normal systolic function.  The estimated ejection fraction is 60%.  Normal left ventricular diastolic function.  Normal right ventricular size with normal right ventricular systolic function.     Anticoagulant/antiplatelets: Eliquis 2.5 mg bid   ECG: Atrial fibrillation     Dysphagia or odynophagia:  No  Liver Disease, esophageal disease, or known varices:  No  Upper GI Bleeding: No  Snoring:  No  Sleep Apnea:  No  Prior neck surgery or radiation:  No  History of anesthetic difficulties:  No  Family history of anesthetic difficulties:  No  Last oral intake: yesterday before midnight  Able to move neck in all directions:  Yes  Platelet count: 237k  INR: 1.1

## 2022-11-03 NOTE — SUBJECTIVE & OBJECTIVE
Past Medical History:   Diagnosis Date    Anemia 11/29/2018    Anxiety 11/28/2017    Arthritis     Atrial fibrillation with RVR 10/9/2019    Back pain     Basal cell carcinoma 03/29/2018    left mid back    Chronic diastolic congestive heart failure 10/15/2019    CKD (chronic kidney disease) stage 3, GFR 30-59 ml/min 8/8/2016    Colon polyps     reported per patient.     Coronary artery calcification 10/5/2021    Fuchs' corneal dystrophy     General anesthetics causing adverse effect in therapeutic use     woke up during surgery and gagged on ET tube    Glaucoma     Glaucoma     Heart failure with preserved left ventricular function (HFpEF) 7/24/2018    Hyperlipidemia     Hypertension     Macular degeneration dry    Obesity     PVD (peripheral vascular disease) 4/26/2021    PVD (peripheral vascular disease) 4/26/2021    Squamous cell carcinoma 01/08/2019    left shoulder    Stenosis of carotid artery 10/5/2021    Trouble in sleeping     Type 2 diabetes mellitus without complication, without long-term current use of insulin 2/24/2021    Urinary tract infection        Past Surgical History:   Procedure Laterality Date    ADENOIDECTOMY  1938    BREAST BIOPSY Left     1997. benign    BREAST CYST EXCISION Left 1997 approx    CARPAL TUNNEL RELEASE Right 2012 approx    CATARACT EXTRACTION Bilateral 1994    CHOLECYSTECTOMY  1975    CORNEAL TRANSPLANT Bilateral 08/2015 8/2015 - left; Right 4/2016    EYE SURGERY      Fuch's Corneal dystrophy - had 2nd operation with Dr. Quintanilla    EYE SURGERY      Cataract wIOL    left thumb Left 2011    removed cuboid for arthritis    REVERSE TOTAL SHOULDER ARTHROPLASTY Left 8/21/2018    Procedure: ARTHROPLASTY, SHOULDER, TOTAL, REVERSE;  Surgeon: Solomon Lujan MD;  Location: [facility];  Service: Orthopedics;  Laterality: Left;  WITH BICEP TENODESIS    SKIN CANCER EXCISION Left 2017    BCC removal by Dr. Valadez    SLT- OS (aka TRABECULOPLASTY) Left 05/11/2011    repeat 3/14/12     TENOTOMY Left 8/21/2018    Procedure: TENOTOMY;  Surgeon: Solomon Lujan MD;  Location: [facility] OR;  Service: Orthopedics;  Laterality: Left;    TONSILLECTOMY  1938    TREATMENT OF CARDIAC ARRHYTHMIA N/A 10/10/2019    Procedure: CARDIOVERSION;  Surgeon: Pete Durán MD;  Location: [facility] CATH LAB;  Service: Cardiology;  Laterality: N/A;    TREATMENT OF CARDIAC ARRHYTHMIA N/A 12/6/2019    Procedure: CARDIOVERSION;  Surgeon: Samy Castillo MD;  Location: [facility] CATH LAB;  Service: Cardiology;  Laterality: N/A;       Review of patient's allergies indicates:   Allergen Reactions    Carvedilol Swelling     lips  lips  Other reaction(s): swelling    Cephalexin Other (See Comments)     Muscle/joint weakness unable to move     Doxycycline calcium Other (See Comments)     Weakness nausea   sob  Other reaction(s): weakness, nausea, SOB    Erythromycin Other (See Comments)     Complete weakness/could not walk    Gadolinium-containing contrast media Other (See Comments)     angioedema  Other reaction(s): Angioedema    Hydrocodone-acetaminophen      wheezing  wheezing  wheezing  Other reaction(s): wheezing    Inveltys [loteprednol etabonate] Palpitations and Other (See Comments)     Patient stated bp went up.    Labetalol Shortness Of Breath, Nausea Only and Other (See Comments)     Palpitations, LE weakness  Other reaction(s): SOB, palpitations, weakness    Loratadine Other (See Comments)     Double vision/spots  Other reaction(s): double vision    Macrolide antibiotics Other (See Comments)     Complete weakness/could not walk  Complete weakness/could not walk  Complete weakness/could not walk  Complete weakness/could not walk  Other reaction(s): complete weakness    Moxifloxacin Other (See Comments)     Hives   muscle soreness  Other reaction(s): hives, muscle soreness    Naproxen      wheezing  wheezing  wheezing  Other reaction(s): wheezing    Statins-hmg-coa reductase inhibitors Other (See Comments)     Severe Muscle  weakness  Muscle weakness  Severe Muscle weakness  Other reaction(s): severe muscle weakness    Timolol Other (See Comments)     Severe muscle weakness, eye problems.  Other reaction(s): severe muscle weakness    Verapamil Diarrhea     Severe diarrhea.  Other reaction(s): diarrhea    Amlodipine      Myalgias    Other reaction(s): myalgian    Doxycycline     Doxycycline hyclate      Other reaction(s): weakness, nausea, SOB    Fenofibrate      Causes muscle weakness  Other reaction(s): muscle weakness    Hydralazine      Other reaction(s): muscle tremors    Hydralazine analogues      Muscle tremors/aches      Hydrocortisone     Isosorbide      Tolerates Imdur    Loteprednol      Other reaction(s): increased BP    Tetanus and diphtheria toxoids     Adhesive Rash     Clonidine patch - 2 mfg - contact dermatitis    Codeine Diarrhea and Other (See Comments)     Loss of balance   Other reaction(s): loss of balance    Doxazosin Palpitations     Other reaction(s): palpitations    Fexofenadine Other (See Comments)     Double vision/spots   Other reaction(s): double vision    Hydrocortisone (bulk) Other (See Comments)     Only suppository/ caused muscle weakness    Latanoprost Other (See Comments)     Muscle weakness  Other reaction(s): muscle weakness    Olopatadine Other (See Comments)     Muscle weakness  Other reaction(s): muscle weakness    Prednisone Anxiety       Current Facility-Administered Medications on File Prior to Encounter   Medication    sodium chloride 0.9% flush 3 mL     Current Outpatient Medications on File Prior to Encounter   Medication Sig    acetaminophen (TYLENOL) 500 MG tablet Take 500 mg by mouth daily as needed. Taking 1 to 3    ALPRAZolam (XANAX) 0.25 MG tablet Take 1 tablet (0.25 mg total) by mouth nightly as needed for Insomnia or Anxiety.    amiodarone (PACERONE) 200 MG Tab Take 1 tablet (200 mg total) by mouth every evening. After 4 weeks continue amiodarone 200mg daily    apixaban (ELIQUIS)  2.5 mg Tab Take 1 tablet (2.5 mg total) by mouth 2 (two) times daily.    b complex vitamins capsule Take 1 capsule by mouth once daily.    cholecalciferol, vitamin D3, (VITAMIN D3) 50 mcg (2,000 unit) Cap Take 1 capsule by mouth once daily.    coenzyme Q10 200 mg capsule Take 400 mg by mouth once daily.     diltiaZEM (CARDIZEM) 60 MG tablet Take 1 tablet (60 mg total) by mouth 2 (two) times a day.    fish oil-omega-3 fatty acids 300-1,000 mg capsule Take 2 g by mouth 2 (two) times daily.     losartan (COZAAR) 25 MG tablet Take 1 tablet (25 mg total) by mouth once daily.    metoprolol tartrate (LOPRESSOR) 25 MG tablet Take 1 tablet (25 mg total) by mouth 2 (two) times daily.    POLYETHYLENE GLYCOL 3350 (MIRALAX ORAL) Take 17 g by mouth 2 (two) times a day. Taking BID    RHOPRESSA 0.02 % ophthalmic solution PLACE 1 DROP INTO THE LEFT EYE EVERY EVENING.    [DISCONTINUED] cloNIDine (CATAPRES) 0.1 MG tablet Take 1 tablet (0.1 mg total) by mouth 2 (two) times daily as needed (systolic BP over 180).    [DISCONTINUED] isosorbide mononitrate (IMDUR) 30 MG 24 hr tablet Take 1 tablet (30 mg total) by mouth every evening.     Family History       Problem Relation (Age of Onset)    Cancer Father (88)    Heart disease Mother    Hypertension Mother          Tobacco Use    Smoking status: Never    Smokeless tobacco: Never    Tobacco comments:     history of passive smoking from her ex-   Substance and Sexual Activity    Alcohol use: Yes     Alcohol/week: 2.0 standard drinks     Types: 2 Standard drinks or equivalent per week     Comment: occ    Drug use: No    Sexual activity: Not Currently     Partners: Male     Birth control/protection: Post-menopausal     Comment: discussed protection for STD's     Review of Systems   Constitutional: Negative for diaphoresis, malaise/fatigue, weight gain and weight loss.   HENT:  Negative for nosebleeds.    Eyes:  Negative for vision loss in left eye, vision loss in right eye and visual  disturbance.   Cardiovascular:  Positive for palpitations. Negative for chest pain, claudication, dyspnea on exertion, irregular heartbeat, leg swelling, near-syncope, orthopnea, paroxysmal nocturnal dyspnea and syncope.   Respiratory:  Negative for cough, shortness of breath, sleep disturbances due to breathing, snoring and wheezing.    Hematologic/Lymphatic: Negative for bleeding problem. Does not bruise/bleed easily.   Skin:  Negative for poor wound healing and rash.   Musculoskeletal:  Negative for muscle cramps and myalgias.   Gastrointestinal:  Negative for bloating, abdominal pain, diarrhea, heartburn, melena, nausea and vomiting.   Genitourinary:  Negative for hematuria and nocturia.   Neurological:  Negative for brief paralysis, dizziness, headaches, light-headedness, numbness and weakness.   Psychiatric/Behavioral:  Negative for depression.    Allergic/Immunologic: Negative for hives.   Objective:     Vital Signs (Most Recent):  BP: (!) 146/82 (11/03/22 0925) Vital Signs (24h Range):  BP: (146)/(82) 146/82        There is no height or weight on file to calculate BMI.            No intake or output data in the 24 hours ending 11/03/22 0930    Lines/Drains/Airways       None                   Physical Exam  Vitals and nursing note reviewed.   Constitutional:       Appearance: She is well-developed. She is not diaphoretic.   HENT:      Head: Normocephalic and atraumatic.   Eyes:      Conjunctiva/sclera: Conjunctivae normal.   Neck:      Vascular: No carotid bruit or JVD.   Cardiovascular:      Rate and Rhythm: Normal rate. Rhythm irregular.      Pulses: Normal pulses and intact distal pulses.      Heart sounds: Normal heart sounds. No murmur heard.    No friction rub. No gallop.   Pulmonary:      Effort: Pulmonary effort is normal.      Breath sounds: Normal breath sounds. No rales.   Chest:      Chest wall: No tenderness.   Abdominal:      General: There is no distension.      Palpations: Abdomen is soft.  There is no mass.      Tenderness: There is no abdominal tenderness.   Skin:     General: Skin is warm and dry.      Coloration: Skin is not pale.   Neurological:      Mental Status: She is alert and oriented to person, place, and time.       Significant Labs: All pertinent lab results from the last 24 hours have been reviewed.

## 2022-11-03 NOTE — H&P
[facility] - Short Stay Cardiac Unit  Cardiac Electrophysiology  History and Physical     Admission Date: 11/3/2022  Code Status: Prior   Attending Provider: Toi Kelly MD   Principal Problem:<principal problem not specified>    Subjective:     Chief Complaint:  AF     HPI:  89 yoF here for CTI/PVI RFA. Off amio and cardizem x 4 days. Last dose of Eliquis was last night. ECG consistent with AF.     Most recent EP visit:  NSR on recent ECGs up until 6/15/22. AFL and AF on ECGs afterwards starting 7/11/22. Event monitor with 100% AF, controlled average V rate. Normal EF. She had AF after a new BP agent 10/19. Her symptoms are mild. She has tried amiodarone in the past which caused side effects. She has history of CAD and has QTc 450s.      Echo 7/12/22:  ·           Concentric remodeling and normal systolic function.  ·           The estimated ejection fraction is 60%.  ·           Normal left ventricular diastolic function.  ·           Normal right ventricular size with normal right ventricular systolic function.     Event monitor 7/22:  - Heart rates varied between 41 and 123 BPM with an average of 77 BPM.  - Predominant rhythm: atrial fibrillation   - Atrial Fibrillation (AF) 100.0 %  - PVC 0.48 %      Past Medical History:   Diagnosis Date    Anemia 11/29/2018    Anxiety 11/28/2017    Arthritis     Atrial fibrillation with RVR 10/9/2019    Back pain     Basal cell carcinoma 03/29/2018    left mid back    Chronic diastolic congestive heart failure 10/15/2019    CKD (chronic kidney disease) stage 3, GFR 30-59 ml/min 8/8/2016    Colon polyps     reported per patient.     Coronary artery calcification 10/5/2021    Fuchs' corneal dystrophy     General anesthetics causing adverse effect in therapeutic use     woke up during surgery and gagged on ET tube    Glaucoma     Glaucoma     Heart failure with preserved left ventricular function (HFpEF) 7/24/2018    Hyperlipidemia     Hypertension      Macular degeneration dry    Obesity     PVD (peripheral vascular disease) 4/26/2021    PVD (peripheral vascular disease) 4/26/2021    Squamous cell carcinoma 01/08/2019    left shoulder    Stenosis of carotid artery 10/5/2021    Trouble in sleeping     Type 2 diabetes mellitus without complication, without long-term current use of insulin 2/24/2021    Urinary tract infection        Past Surgical History:   Procedure Laterality Date    ADENOIDECTOMY  1938    BREAST BIOPSY Left     1997. benign    BREAST CYST EXCISION Left 1997 approx    CARPAL TUNNEL RELEASE Right 2012 approx    CATARACT EXTRACTION Bilateral 1994    CHOLECYSTECTOMY  1975    CORNEAL TRANSPLANT Bilateral 08/2015 8/2015 - left; Right 4/2016    EYE SURGERY      Fuch's Corneal dystrophy - had 2nd operation with Dr. Quintanilla    EYE SURGERY      Cataract wIOL    left thumb Left 2011    removed cuboid for arthritis    REVERSE TOTAL SHOULDER ARTHROPLASTY Left 8/21/2018    Procedure: ARTHROPLASTY, SHOULDER, TOTAL, REVERSE;  Surgeon: Solomon Lujan MD;  Location: [facility] OR;  Service: Orthopedics;  Laterality: Left;  WITH BICEP TENODESIS    SKIN CANCER EXCISION Left 2017    BCC removal by Dr. Valadez    SLT- OS (aka TRABECULOPLASTY) Left 05/11/2011    repeat 3/14/12    TENOTOMY Left 8/21/2018    Procedure: TENOTOMY;  Surgeon: Solomon Lujan MD;  Location: [facility] OR;  Service: Orthopedics;  Laterality: Left;    TONSILLECTOMY  1938    TREATMENT OF CARDIAC ARRHYTHMIA N/A 10/10/2019    Procedure: CARDIOVERSION;  Surgeon: Pete Durán MD;  Location: [facility] CATH LAB;  Service: Cardiology;  Laterality: N/A;    TREATMENT OF CARDIAC ARRHYTHMIA N/A 12/6/2019    Procedure: CARDIOVERSION;  Surgeon: Samy Castillo MD;  Location: [facility] CATH LAB;  Service: Cardiology;  Laterality: N/A;       Review of patient's allergies indicates:   Allergen Reactions    Carvedilol Swelling     lips  lips  Other reaction(s): swelling    Cephalexin Other (See  Comments)     Muscle/joint weakness unable to move     Doxycycline calcium Other (See Comments)     Weakness nausea   sob  Other reaction(s): weakness, nausea, SOB    Erythromycin Other (See Comments)     Complete weakness/could not walk    Gadolinium-containing contrast media Other (See Comments)     angioedema  Other reaction(s): Angioedema    Hydrocodone-acetaminophen      wheezing  wheezing  wheezing  Other reaction(s): wheezing    Inveltys [loteprednol etabonate] Palpitations and Other (See Comments)     Patient stated bp went up.    Labetalol Shortness Of Breath, Nausea Only and Other (See Comments)     Palpitations, LE weakness  Other reaction(s): SOB, palpitations, weakness    Loratadine Other (See Comments)     Double vision/spots  Other reaction(s): double vision    Macrolide antibiotics Other (See Comments)     Complete weakness/could not walk  Complete weakness/could not walk  Complete weakness/could not walk  Complete weakness/could not walk  Other reaction(s): complete weakness    Moxifloxacin Other (See Comments)     Hives   muscle soreness  Other reaction(s): hives, muscle soreness    Naproxen      wheezing  wheezing  wheezing  Other reaction(s): wheezing    Statins-hmg-coa reductase inhibitors Other (See Comments)     Severe Muscle weakness  Muscle weakness  Severe Muscle weakness  Other reaction(s): severe muscle weakness    Timolol Other (See Comments)     Severe muscle weakness, eye problems.  Other reaction(s): severe muscle weakness    Verapamil Diarrhea     Severe diarrhea.  Other reaction(s): diarrhea    Amlodipine      Myalgias    Other reaction(s): myalgian    Doxycycline     Doxycycline hyclate      Other reaction(s): weakness, nausea, SOB    Fenofibrate      Causes muscle weakness  Other reaction(s): muscle weakness    Hydralazine      Other reaction(s): muscle tremors    Hydralazine analogues      Muscle tremors/aches      Hydrocortisone     Isosorbide      Tolerates  Imdur    Loteprednol      Other reaction(s): increased BP    Tetanus and diphtheria toxoids     Adhesive Rash     Clonidine patch - 2 mfg - contact dermatitis    Codeine Diarrhea and Other (See Comments)     Loss of balance   Other reaction(s): loss of balance    Doxazosin Palpitations     Other reaction(s): palpitations    Fexofenadine Other (See Comments)     Double vision/spots   Other reaction(s): double vision    Hydrocortisone (bulk) Other (See Comments)     Only suppository/ caused muscle weakness    Latanoprost Other (See Comments)     Muscle weakness  Other reaction(s): muscle weakness    Olopatadine Other (See Comments)     Muscle weakness  Other reaction(s): muscle weakness    Prednisone Anxiety       Current Facility-Administered Medications on File Prior to Encounter   Medication    sodium chloride 0.9% flush 3 mL     Current Outpatient Medications on File Prior to Encounter   Medication Sig    acetaminophen (TYLENOL) 500 MG tablet Take 500 mg by mouth daily as needed. Taking 1 to 3    ALPRAZolam (XANAX) 0.25 MG tablet Take 1 tablet (0.25 mg total) by mouth nightly as needed for Insomnia or Anxiety.    amiodarone (PACERONE) 200 MG Tab Take 1 tablet (200 mg total) by mouth every evening. After 4 weeks continue amiodarone 200mg daily    apixaban (ELIQUIS) 2.5 mg Tab Take 1 tablet (2.5 mg total) by mouth 2 (two) times daily.    b complex vitamins capsule Take 1 capsule by mouth once daily.    cholecalciferol, vitamin D3, (VITAMIN D3) 50 mcg (2,000 unit) Cap Take 1 capsule by mouth once daily.    coenzyme Q10 200 mg capsule Take 400 mg by mouth once daily.     diltiaZEM (CARDIZEM) 60 MG tablet Take 1 tablet (60 mg total) by mouth 2 (two) times a day.    fish oil-omega-3 fatty acids 300-1,000 mg capsule Take 2 g by mouth 2 (two) times daily.     losartan (COZAAR) 25 MG tablet Take 1 tablet (25 mg total) by mouth once daily.    metoprolol tartrate (LOPRESSOR) 25 MG tablet Take 1 tablet  (25 mg total) by mouth 2 (two) times daily.    POLYETHYLENE GLYCOL 3350 (MIRALAX ORAL) Take 17 g by mouth 2 (two) times a day. Taking BID    RHOPRESSA 0.02 % ophthalmic solution PLACE 1 DROP INTO THE LEFT EYE EVERY EVENING.    [DISCONTINUED] cloNIDine (CATAPRES) 0.1 MG tablet Take 1 tablet (0.1 mg total) by mouth 2 (two) times daily as needed (systolic BP over 180).    [DISCONTINUED] isosorbide mononitrate (IMDUR) 30 MG 24 hr tablet Take 1 tablet (30 mg total) by mouth every evening.     Family History       Problem Relation (Age of Onset)    Cancer Father (88)    Heart disease Mother    Hypertension Mother          Tobacco Use    Smoking status: Never    Smokeless tobacco: Never    Tobacco comments:     history of passive smoking from her ex-   Substance and Sexual Activity    Alcohol use: Yes     Alcohol/week: 2.0 standard drinks     Types: 2 Standard drinks or equivalent per week     Comment: occ    Drug use: No    Sexual activity: Not Currently     Partners: Male     Birth control/protection: Post-menopausal     Comment: discussed protection for STD's     Review of Systems   All other systems reviewed and are negative.  Objective:     Vital Signs (Most Recent):  Temp: 98.8 °F (37.1 °C) (11/03/22 0920)  Pulse: 88 (11/03/22 0920)  Resp: 16 (11/03/22 0920)  BP: (!) 146/82 (11/03/22 0925)  SpO2: (!) 93 % (11/03/22 0920)   Vital Signs (24h Range):  Temp:  [98.8 °F (37.1 °C)] 98.8 °F (37.1 °C)  Pulse:  [88] 88  Resp:  [16] 16  SpO2:  [93 %] 93 %  BP: (146)/(82) 146/82       Weight: 81.6 kg (180 lb)  Body mass index is 30.9 kg/m².    SpO2: (!) 93 %  O2 Device (Oxygen Therapy): room air    Physical Exam  General: NAD. AAO  HENT: EOMI  Neck: supple. No JVD  CV: irregularly irregular. Normal S1/S2. No gallops, rubs, or murmurs. 2+ radial pulses  Resp: CTAB. No increased work of breathing  Ext: warm. No edema.      Significant Labs: All pertinent lab results from the last 24 hours have been  reviewed.    Significant Imaging:  Reviewed    Assessment and Plan:     PAF (paroxysmal atrial fibrillation)  - RF-PVI/CTI  - STACEY pre-RFA  - PPI x 30 days post-procedure    Anticoagulation: apixiban (dose reduced)  EF (most recent): 60%  AAD/AVN agents: dilt 60 bid, amio 200 qd    The risks, benefits and alternatives of the procedure were explained to the patient, patient's family and/or surrogate decision maker. Risks include (but not limited to) bleeding, hematoma, infection, pain, vascular damage, damage to structures surrounding the vasculature, myocardial damage [perforation, valvular damage], cardiac tamponade, phrenic nerve damage, pulmonary vein stenosis, atrioesophageal (AE) fistula, CVA, MI, and death. Patient is understanding that repeat ablations may be required. All questions were answered. Patient is understanding of these risks, and would like to proceed. Consents signed.        Dilan Drummond MD  Cardiac Electrophysiology  [facility] - Short Stay Cardiac [facility]

## 2022-11-03 NOTE — PLAN OF CARE
"Vss. Sb noted on cm.  S/p ablation.  S/p cardioversion x1 200j in ep lab.  Arrived with julius groin sutures x1 each groin, placed at 1300. Sutures removal time 1700. Bedrest done at 1900. Pt arrived with yang in place secured to left leg. Clear yellow urine noted 500ml. Yang to be removed at 1900.  Pt's son updated over phone. Verbalizes understanding. Pt's son notified to go to pt's new room 300. Pt's walker and belongings bag x2 with her to new room.  12 lead ekg done and in chart per md order. Tele box on confirmed by tele tech.  2l nc to portable oxygen tank. Once transport brings to room, will place on 2l nc wall oxygen.  No hematoma or drainage noted to julius groins. Doppler ble pulses and marked.  Pt tolerating sips of water.  Upon arrival pt's upper lip swollen s/p cardioversion.  Chapstick placed per pt request. Pt complaints of "low back pain". Julius blanket rolls placed under julius knees. Prn tylenol given per md order. At this time "tolerable" see flowsheet for full assessment.   "

## 2022-11-03 NOTE — ANESTHESIA PREPROCEDURE EVALUATION
11/03/2022  Shirley Metz is a 89 y.o., female.  [facility]  Anesthesia Pre-Operative Evaluation       Patient Name: Shirley Metz  YOB: 1932  MRN: [id number]  [facility]: [id number]      Code Status: Prior   Date of Procedure: 11/3/2022  Anesthesia: General Procedure: Procedure(s) (LRB):  Ablation atrial fibrillation (N/A)  ABLATION, ATRIAL FLUTTER, TYPICAL (N/A)  Transesophageal echo (STACEY) intra-procedure log documentation (N/A)  Pre-Operative Diagnosis: Paroxysmal atrial fibrillation [I48.0]  Typical atrial flutter [I48.3]  Proceduralist: Surgeon(s) and Role:  Panel 1:     * Toi Kelly MD - Primary  Panel 2:     * [facility] Diagnostic Provider - Primary        SUBJECTIVE:   Shirley Metz is a 89 y.o. female who  has a past medical history of Anemia (11/29/2018), Anxiety (11/28/2017), Arthritis, Atrial fibrillation with RVR (10/9/2019), Back pain, Basal cell carcinoma (03/29/2018), Chronic diastolic congestive heart failure (10/15/2019), CKD (chronic kidney disease) stage 3, GFR 30-59 ml/min (8/8/2016), Colon polyps, Coronary artery calcification (10/5/2021), Fuchs' corneal dystrophy, General anesthetics causing adverse effect in therapeutic use, Glaucoma, Glaucoma, Heart failure with preserved left ventricular function (HFpEF) (7/24/2018), Hyperlipidemia, Hypertension, Macular degeneration (dry), Obesity, PVD (peripheral vascular disease) (4/26/2021), PVD (peripheral vascular disease) (4/26/2021), Squamous cell carcinoma (01/08/2019), Stenosis of carotid artery (10/5/2021), Trouble in sleeping, Type 2 diabetes mellitus without complication, without long-term current use of insulin (2/24/2021), and Urinary tract infection.    Shirley Metz is a 89 y.o. female pt with HTN, PAF on Eliquis, PVD, carotid artery disease,  HFpEF, preDM,   hyperlipidemia, palpitations, CKD presents today for ablation.       8/17/22: NSR on recent ECGs up until 6/15/22. AFL and AF on ECGs afterwards starting 7/11/22. Event monitor with 100% AF, controlled average V rate. Normal EF. She had AF after a new BP agent 10/19. Her symptoms are mild. She has tried amiodarone in the past which caused side effects. She has history of CAD and has QTc 450s.      Interval history: AF 9/2/22 ECG. She continues to have symptomatic AF as well as symptoms on medication. She wishes to pursue ablation.       she has a current medication list which includes the following long-term medication(s): alprazolam, amiodarone, coenzyme q10, diltiazem, fish oil-omega-3 fatty acids, furosemide, losartan, metoprolol tartrate, timolol maleate 0.5%, travoprost, [DISCONTINUED] clonidine, and [DISCONTINUED] isosorbide mononitrate.   ALLERGIES:     Review of patient's allergies indicates:   Allergen Reactions    Carvedilol Swelling     lips  lips  Other reaction(s): swelling    Cephalexin Other (See Comments)     Muscle/joint weakness unable to move     Doxycycline calcium Other (See Comments)     Weakness nausea   sob  Other reaction(s): weakness, nausea, SOB    Erythromycin Other (See Comments)     Complete weakness/could not walk    Gadolinium-containing contrast media Other (See Comments)     angioedema  Other reaction(s): Angioedema    Hydrocodone-acetaminophen      wheezing  wheezing  wheezing  Other reaction(s): wheezing    Inveltys [loteprednol etabonate] Palpitations and Other (See Comments)     Patient stated bp went up.    Labetalol Shortness Of Breath, Nausea Only and Other (See Comments)     Palpitations, LE weakness  Other reaction(s): SOB, palpitations, weakness    Loratadine Other (See Comments)     Double vision/spots  Other reaction(s): double vision    Macrolide antibiotics Other (See Comments)     Complete weakness/could not walk  Complete weakness/could not walk  Complete  weakness/could not walk  Complete weakness/could not walk  Other reaction(s): complete weakness    Moxifloxacin Other (See Comments)     Hives   muscle soreness  Other reaction(s): hives, muscle soreness    Naproxen      wheezing  wheezing  wheezing  Other reaction(s): wheezing    Statins-hmg-coa reductase inhibitors Other (See Comments)     Severe Muscle weakness  Muscle weakness  Severe Muscle weakness  Other reaction(s): severe muscle weakness    Timolol Other (See Comments)     Severe muscle weakness, eye problems.  Other reaction(s): severe muscle weakness    Verapamil Diarrhea     Severe diarrhea.  Other reaction(s): diarrhea    Amlodipine      Myalgias    Other reaction(s): myalgian    Doxycycline     Doxycycline hyclate      Other reaction(s): weakness, nausea, SOB    Fenofibrate      Causes muscle weakness  Other reaction(s): muscle weakness    Hydralazine      Other reaction(s): muscle tremors    Hydralazine analogues      Muscle tremors/aches      Hydrocortisone     Isosorbide      Tolerates Imdur    Loteprednol      Other reaction(s): increased BP    Tetanus and diphtheria toxoids     Adhesive Rash     Clonidine patch - 2 mfg - contact dermatitis    Codeine Diarrhea and Other (See Comments)     Loss of balance   Other reaction(s): loss of balance    Doxazosin Palpitations     Other reaction(s): palpitations    Fexofenadine Other (See Comments)     Double vision/spots   Other reaction(s): double vision    Hydrocortisone (bulk) Other (See Comments)     Only suppository/ caused muscle weakness    Latanoprost Other (See Comments)     Muscle weakness  Other reaction(s): muscle weakness    Olopatadine Other (See Comments)     Muscle weakness  Other reaction(s): muscle weakness    Prednisone Anxiety     LDA:      Lines/Drains/Airways     None               MEDICATIONS:     Antibiotics (From admission, onward)    Start     Stop Route Frequency Ordered    11/03/22 0836  vancomycin in  dextrose 5 % 1 gram/250 mL IVPB 1,000 mg         -- IV On Call Procedure 11/03/22 0848        VTE Risk Mitigation (From admission, onward)    None        Current Facility-Administered Medications   Medication Dose Route Frequency Provider Last Rate Last Admin    vancomycin in dextrose 5 % 1 gram/250 mL IVPB 1,000 mg  1,000 mg Intravenous On Call Procedure Vianney Fountain, VINITA         Facility-Administered Medications Ordered in Other Encounters   Medication Dose Route Frequency Provider Last Rate Last Admin    sodium chloride 0.9% flush 3 mL  3 mL Intravenous PRN Steve Galarza PA-C              History:   There are no hospital problems to display for this patient.    Patient Active Problem List   Diagnosis    HTN (hypertension)    Mixed hyperlipidemia    Primary osteoarthritis involving multiple joints    Macular degeneration - Both Eyes    Fuch's endothelial dystrophy - Both Eyes    Lens replaced by other means    COAG (chronic open-angle glaucoma) - Both Eyes    Blepharitis, unspecified - Both Eyes    Shortness of breath    Abnormal ECG    PVC's (premature ventricular contractions)    Other microscopic hematuria    LVH (left ventricular hypertrophy) due to hypertensive disease    Osteopenia of hip    Myalgia    Calcification of aorta    Neuropathy    Unequal blood pressures in paired extremities    Medication side effects    History of cornea transplant    Pseudophakia    Insomnia    Anxiety    Pre-diabetes    Obesity (BMI 30.0-34.9)    Elevated troponin    Bilateral shoulder region arthritis    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    Anemia    Thrombus of left atrial appendage    PAF (paroxysmal atrial fibrillation)    Elevated liver enzymes    Atrial flutter with rapid ventricular response    Chronic anticoagulation    Keratitis sicca, bilateral    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    PAD (peripheral  artery disease)    Statin intolerance    Pulmonary nodules/lesions, multiple    Spinal stenosis at L4-L5 level    Pulmonary granuloma    Lumbar spondylosis    Bilateral recurrent inguinal hernia without obstruction or gangrene    LLQ abdominal pain    Pain in left leg    Rectal abnormality    Stenosis of carotid artery    Carotid artery calcification, bilateral    B12 deficiency    Seasonal allergic rhinitis    Atrial fibrillation with RVR    Elevated serum creatinine    Coagulopathy     Past Medical History:   Diagnosis Date    Anemia 11/29/2018    Anxiety 11/28/2017    Arthritis     Atrial fibrillation with RVR 10/9/2019    Back pain     Basal cell carcinoma 03/29/2018    left mid back    Chronic diastolic congestive heart failure 10/15/2019    CKD (chronic kidney disease) stage 3, GFR 30-59 ml/min 8/8/2016    Colon polyps     reported per patient.     Coronary artery calcification 10/5/2021    Fuchs' corneal dystrophy     General anesthetics causing adverse effect in therapeutic use     woke up during surgery and gagged on ET tube    Glaucoma     Glaucoma     Heart failure with preserved left ventricular function (HFpEF) 7/24/2018    Hyperlipidemia     Hypertension     Macular degeneration dry    Obesity     PVD (peripheral vascular disease) 4/26/2021    PVD (peripheral vascular disease) 4/26/2021    Squamous cell carcinoma 01/08/2019    left shoulder    Stenosis of carotid artery 10/5/2021    Trouble in sleeping     Type 2 diabetes mellitus without complication, without long-term current use of insulin 2/24/2021    Urinary tract infection        Surgical History:    has a past surgical history that includes Carpal tunnel release (Right, 2012 approx); left thumb (Left, 2011); Cataract extraction (Bilateral, 1994); Corneal transplant (Bilateral, 08/2015); SLT- OS (aka TRABECULOPLASTY) (Left, 05/11/2011); Breast cyst excision (Left, 1997 approx); Tonsillectomy (1938);  Adenoidectomy (1938); Cholecystectomy (1975); Eye surgery; Eye surgery; Breast biopsy (Left); Reverse total shoulder arthroplasty (Left, 8/21/2018); Tenotomy (Left, 8/21/2018); Skin cancer excision (Left, 2017); Treatment of cardiac arrhythmia (N/A, 10/10/2019); and Treatment of cardiac arrhythmia (N/A, 12/6/2019).   Social History:   .  reports that she has never smoked. She has never used smokeless tobacco. She reports current alcohol use of about 2.0 standard drinks per week. She reports that she does not use drugs.     OBJECTIVE:     Vital Signs (Most Recent):    Vital Signs Range (Last 24H):          There is no height or weight on file to calculate BMI.   Wt Readings from Last 4 Encounters:   10/25/22 84.3 kg (185 lb 13.6 oz)   10/13/22 83.3 kg (183 lb 10.3 oz)   10/12/22 83.3 kg (183 lb 9.6 oz)   09/29/22 84.5 kg (186 lb 4.6 oz)     Significant Labs:  Lab Results   Component Value Date    WBC 7.11 10/25/2022    HGB 11.9 (L) 10/25/2022    HCT 37.7 10/25/2022     10/25/2022     10/25/2022    K 4.0 10/25/2022     10/25/2022    CREATININE 1.8 (H) 10/25/2022    BUN 36 (H) 10/25/2022    CO2 23 10/25/2022     (H) 10/25/2022    CALCIUM 9.3 10/25/2022    MG 2.0 10/19/2022    PHOS 3.6 04/26/2022    ALKPHOS 78 09/29/2022    ALT 11 09/29/2022    AST 15 09/29/2022    ALBUMIN 3.5 09/29/2022    INR 1.1 10/25/2022    APTT 30.2 10/25/2022    HGBA1C 6.2 (H) 08/31/2022    CPK 35 08/31/2022    CPKMB 1.3 03/15/2018    TROPONINI 0.091 (H) 07/12/2022    MB 2.7 03/15/2018     (H) 10/19/2022    NTPROBNP 760 (H) 12/22/2020     No LMP recorded (lmp unknown). Patient is postmenopausal.  No results found for this or any previous visit (from the past 72 hour(s)).    EKG:   Results for orders placed or performed in visit on 09/02/22   IN OFFICE EKG 12-LEAD (to [facility])    Collection Time: 09/02/22 11:50 AM    Narrative    Test Reason : I48.0,    Vent. Rate : 083 BPM     Atrial Rate : 127 BPM     P-R Int : 000  ms          QRS Dur : 092 ms      QT Int : 444 ms       P-R-T Axes : 000 085 127 degrees     QTc Int : 521 ms    Atrial fibrillation  Nonspecific T wave abnormality  When compared with ECG of 13-JUL-2022 08:41,  Atrial fibrillation has replaced Atrial flutter  Confirmed by GLORIA CAICEDO MD ([id number]) on 9/2/2022 10:28:58 PM    Referred By:             Confirmed By:GLORIA CAICEDO MD       TTE:  Results for orders placed or performed during the hospital encounter of 07/11/22   Echo   Result Value Ref Range    EF 60 %    Narrative    · Concentric remodeling and normal systolic function.  · The estimated ejection fraction is 60%.  · Normal left ventricular diastolic function.  · Normal right ventricular size with normal right ventricular systolic   function.        EF   Date Value Ref Range Status   07/12/2022 60 % Final      Results for orders placed or performed during the hospital encounter of 10/09/19   2D echo with color flow doppler   Result Value Ref Range    EF + QEF 55 55 - 65    Mitral Valve Regurgitation MILD TO MODERATE     Diastolic Dysfunction No     Est. PA Systolic Pressure 40.04 (A)     Pericardial Effusion TRIVIAL     Tricuspid Valve Regurgitation MILD        ASSESSMENT/PLAN:         Pre-op Assessment    I have reviewed the Patient Summary Reports.     I have reviewed the Nursing Notes. I have reviewed the NPO Status.   I have reviewed the Medications.     Review of Systems      Physical Exam  General: Well nourished, Cooperative and Alert    Airway:  Mallampati: III / II  Mouth Opening: Normal  TM Distance: Normal  Tongue: Normal  Neck ROM: Normal ROM    Chest/Lungs:  Normal Respiratory Rate    Heart:  Rate: Normal  Rhythm: Regular Rhythm        Anesthesia Plan  Type of Anesthesia, risks & benefits discussed:    Anesthesia Type: Gen ETT  Intra-op Monitoring Plan: Standard ASA Monitors and Art Line  Post Op Pain Control Plan: IV/PO Opioids PRN  Induction:  IV  Informed Consent: Informed consent  signed with the Patient and all parties understand the risks and agree with anesthesia plan.  All questions answered.   ASA Score: 3  Day of Surgery Review of History & Physical: H&P Update referred to the surgeon/provider.    Ready For Surgery From Anesthesia Perspective.     .

## 2022-11-03 NOTE — CONSULTS
[facility]  Cardiology  Consult Note    Patient Name: Shirley Metz  MRN: [id number]  Admission Date: 11/3/2022  Hospital Length of Stay: 0 days  Code Status: Prior   Attending Provider: Toi Kelly MD   Consulting Provider: Elizabeth Velásquez PA-C  Primary Care Physician: Heather Miller NP  Principal Problem:<principal problem not specified>    Patient information was obtained from patient and past medical records.     Consults  Subjective:     Chief Complaint: Palpitations    HPI:   Shirley Metz is a 89 y.o. female pt with HTN, PAF/AFL on Eliquis, PVD, carotid artery disease, HFpEF, preDM, hyperlipidemia, and CKD. Pt has been in symptomatic AFL and AF since July of this year. Event monitor showed 100% AF. She is following with Dr. Kelly and presents today for STACEY and RF PVI.      TTE 7/12/22   Concentric remodeling and normal systolic function.   The estimated ejection fraction is 60%.   Normal left ventricular diastolic function.   Normal right ventricular size with normal right ventricular systolic function.     Anticoagulant/antiplatelets: Eliquis 2.5 mg bid   ECG: Atrial fibrillation     Dysphagia or odynophagia:  No  Liver Disease, esophageal disease, or known varices:  No  Upper GI Bleeding: No  Snoring:  No  Sleep Apnea:  No  Prior neck surgery or radiation:  No  History of anesthetic difficulties:  No  Family history of anesthetic difficulties:  No  Last oral intake: yesterday before midnight  Able to move neck in all directions:  Yes  Platelet count: 237k  INR: 1.1         Past Medical History:   Diagnosis Date    Anemia 11/29/2018    Anxiety 11/28/2017    Arthritis     Atrial fibrillation with RVR 10/9/2019    Back pain     Basal cell carcinoma 03/29/2018    left mid back    Chronic diastolic congestive heart failure 10/15/2019    CKD (chronic kidney disease) stage 3, GFR 30-59 ml/min 8/8/2016    Colon polyps     reported per patient.      Coronary artery calcification 10/5/2021    Fuchs' corneal dystrophy     General anesthetics causing adverse effect in therapeutic use     woke up during surgery and gagged on ET tube    Glaucoma     Glaucoma     Heart failure with preserved left ventricular function (HFpEF) 7/24/2018    Hyperlipidemia     Hypertension     Macular degeneration dry    Obesity     PVD (peripheral vascular disease) 4/26/2021    PVD (peripheral vascular disease) 4/26/2021    Squamous cell carcinoma 01/08/2019    left shoulder    Stenosis of carotid artery 10/5/2021    Trouble in sleeping     Type 2 diabetes mellitus without complication, without long-term current use of insulin 2/24/2021    Urinary tract infection        Past Surgical History:   Procedure Laterality Date    ADENOIDECTOMY  1938    BREAST BIOPSY Left     1997. benign    BREAST CYST EXCISION Left 1997 approx    CARPAL TUNNEL RELEASE Right 2012 approx    CATARACT EXTRACTION Bilateral 1994    CHOLECYSTECTOMY  1975    CORNEAL TRANSPLANT Bilateral 08/2015 8/2015 - left; Right 4/2016    EYE SURGERY      Fuch's Corneal dystrophy - had 2nd operation with Dr. Quintanilla    EYE SURGERY      Cataract wIOL    left thumb Left 2011    removed cuboid for arthritis    REVERSE TOTAL SHOULDER ARTHROPLASTY Left 8/21/2018    Procedure: ARTHROPLASTY, SHOULDER, TOTAL, REVERSE;  Surgeon: Solomon Lujan MD;  Location: [facility] OR;  Service: Orthopedics;  Laterality: Left;  WITH BICEP TENODESIS    SKIN CANCER EXCISION Left 2017    BCC removal by Dr. Valadez    SLT- OS (aka TRABECULOPLASTY) Left 05/11/2011    repeat 3/14/12    TENOTOMY Left 8/21/2018    Procedure: TENOTOMY;  Surgeon: Solomon Lujan MD;  Location: [facility] OR;  Service: Orthopedics;  Laterality: Left;    TONSILLECTOMY  1938    TREATMENT OF CARDIAC ARRHYTHMIA N/A 10/10/2019    Procedure: CARDIOVERSION;  Surgeon: Pete Durán MD;  Location: [facility] CATH LAB;  Service: Cardiology;  Laterality: N/A;     TREATMENT OF CARDIAC ARRHYTHMIA N/A 12/6/2019    Procedure: CARDIOVERSION;  Surgeon: Samy Castillo MD;  Location: [facility] CATH LAB;  Service: Cardiology;  Laterality: N/A;       Review of patient's allergies indicates:   Allergen Reactions    Carvedilol Swelling     lips  lips  Other reaction(s): swelling    Cephalexin Other (See Comments)     Muscle/joint weakness unable to move     Doxycycline calcium Other (See Comments)     Weakness nausea   sob  Other reaction(s): weakness, nausea, SOB    Erythromycin Other (See Comments)     Complete weakness/could not walk    Gadolinium-containing contrast media Other (See Comments)     angioedema  Other reaction(s): Angioedema    Hydrocodone-acetaminophen      wheezing  wheezing  wheezing  Other reaction(s): wheezing    Inveltys [loteprednol etabonate] Palpitations and Other (See Comments)     Patient stated bp went up.    Labetalol Shortness Of Breath, Nausea Only and Other (See Comments)     Palpitations, LE weakness  Other reaction(s): SOB, palpitations, weakness    Loratadine Other (See Comments)     Double vision/spots  Other reaction(s): double vision    Macrolide antibiotics Other (See Comments)     Complete weakness/could not walk  Complete weakness/could not walk  Complete weakness/could not walk  Complete weakness/could not walk  Other reaction(s): complete weakness    Moxifloxacin Other (See Comments)     Hives   muscle soreness  Other reaction(s): hives, muscle soreness    Naproxen      wheezing  wheezing  wheezing  Other reaction(s): wheezing    Statins-hmg-coa reductase inhibitors Other (See Comments)     Severe Muscle weakness  Muscle weakness  Severe Muscle weakness  Other reaction(s): severe muscle weakness    Timolol Other (See Comments)     Severe muscle weakness, eye problems.  Other reaction(s): severe muscle weakness    Verapamil Diarrhea     Severe diarrhea.  Other reaction(s): diarrhea    Amlodipine      Myalgias    Other reaction(s):  myalgian    Doxycycline     Doxycycline hyclate      Other reaction(s): weakness, nausea, SOB    Fenofibrate      Causes muscle weakness  Other reaction(s): muscle weakness    Hydralazine      Other reaction(s): muscle tremors    Hydralazine analogues      Muscle tremors/aches      Hydrocortisone     Isosorbide      Tolerates Imdur    Loteprednol      Other reaction(s): increased BP    Tetanus and diphtheria toxoids     Adhesive Rash     Clonidine patch - 2 mfg - contact dermatitis    Codeine Diarrhea and Other (See Comments)     Loss of balance   Other reaction(s): loss of balance    Doxazosin Palpitations     Other reaction(s): palpitations    Fexofenadine Other (See Comments)     Double vision/spots   Other reaction(s): double vision    Hydrocortisone (bulk) Other (See Comments)     Only suppository/ caused muscle weakness    Latanoprost Other (See Comments)     Muscle weakness  Other reaction(s): muscle weakness    Olopatadine Other (See Comments)     Muscle weakness  Other reaction(s): muscle weakness    Prednisone Anxiety       Current Facility-Administered Medications on File Prior to Encounter   Medication    sodium chloride 0.9% flush 3 mL     Current Outpatient Medications on File Prior to Encounter   Medication Sig    acetaminophen (TYLENOL) 500 MG tablet Take 500 mg by mouth daily as needed. Taking 1 to 3    ALPRAZolam (XANAX) 0.25 MG tablet Take 1 tablet (0.25 mg total) by mouth nightly as needed for Insomnia or Anxiety.    amiodarone (PACERONE) 200 MG Tab Take 1 tablet (200 mg total) by mouth every evening. After 4 weeks continue amiodarone 200mg daily    apixaban (ELIQUIS) 2.5 mg Tab Take 1 tablet (2.5 mg total) by mouth 2 (two) times daily.    b complex vitamins capsule Take 1 capsule by mouth once daily.    cholecalciferol, vitamin D3, (VITAMIN D3) 50 mcg (2,000 unit) Cap Take 1 capsule by mouth once daily.    coenzyme Q10 200 mg capsule Take 400 mg by mouth once daily.      diltiaZEM (CARDIZEM) 60 MG tablet Take 1 tablet (60 mg total) by mouth 2 (two) times a day.    fish oil-omega-3 fatty acids 300-1,000 mg capsule Take 2 g by mouth 2 (two) times daily.     losartan (COZAAR) 25 MG tablet Take 1 tablet (25 mg total) by mouth once daily.    metoprolol tartrate (LOPRESSOR) 25 MG tablet Take 1 tablet (25 mg total) by mouth 2 (two) times daily.    POLYETHYLENE GLYCOL 3350 (MIRALAX ORAL) Take 17 g by mouth 2 (two) times a day. Taking BID    RHOPRESSA 0.02 % ophthalmic solution PLACE 1 DROP INTO THE LEFT EYE EVERY EVENING.    [DISCONTINUED] cloNIDine (CATAPRES) 0.1 MG tablet Take 1 tablet (0.1 mg total) by mouth 2 (two) times daily as needed (systolic BP over 180).    [DISCONTINUED] isosorbide mononitrate (IMDUR) 30 MG 24 hr tablet Take 1 tablet (30 mg total) by mouth every evening.     Family History       Problem Relation (Age of Onset)    Cancer Father (88)    Heart disease Mother    Hypertension Mother          Tobacco Use    Smoking status: Never    Smokeless tobacco: Never    Tobacco comments:     history of passive smoking from her ex-   Substance and Sexual Activity    Alcohol use: Yes     Alcohol/week: 2.0 standard drinks     Types: 2 Standard drinks or equivalent per week     Comment: occ    Drug use: No    Sexual activity: Not Currently     Partners: Male     Birth control/protection: Post-menopausal     Comment: discussed protection for STD's     Review of Systems   Constitutional: Negative for diaphoresis, malaise/fatigue, weight gain and weight loss.   HENT:  Negative for nosebleeds.    Eyes:  Negative for vision loss in left eye, vision loss in right eye and visual disturbance.   Cardiovascular:  Positive for palpitations. Negative for chest pain, claudication, dyspnea on exertion, irregular heartbeat, leg swelling, near-syncope, orthopnea, paroxysmal nocturnal dyspnea and syncope.   Respiratory:  Negative for cough, shortness of breath, sleep disturbances  due to breathing, snoring and wheezing.    Hematologic/Lymphatic: Negative for bleeding problem. Does not bruise/bleed easily.   Skin:  Negative for poor wound healing and rash.   Musculoskeletal:  Negative for muscle cramps and myalgias.   Gastrointestinal:  Negative for bloating, abdominal pain, diarrhea, heartburn, melena, nausea and vomiting.   Genitourinary:  Negative for hematuria and nocturia.   Neurological:  Negative for brief paralysis, dizziness, headaches, light-headedness, numbness and weakness.   Psychiatric/Behavioral:  Negative for depression.    Allergic/Immunologic: Negative for hives.   Objective:     Vital Signs (Most Recent):  BP: (!) 146/82 (11/03/22 0925) Vital Signs (24h Range):  BP: (146)/(82) 146/82        There is no height or weight on file to calculate BMI.            No intake or output data in the 24 hours ending 11/03/22 0930    Lines/Drains/Airways       None                   Physical Exam  Vitals and nursing note reviewed.   Constitutional:       Appearance: She is well-developed. She is not diaphoretic.   HENT:      Head: Normocephalic and atraumatic.   Eyes:      Conjunctiva/sclera: Conjunctivae normal.   Neck:      Vascular: No carotid bruit or JVD.   Cardiovascular:      Rate and Rhythm: Normal rate. Rhythm irregular.      Pulses: Normal pulses and intact distal pulses.      Heart sounds: Normal heart sounds. No murmur heard.    No friction rub. No gallop.   Pulmonary:      Effort: Pulmonary effort is normal.      Breath sounds: Normal breath sounds. No rales.   Chest:      Chest wall: No tenderness.   Abdominal:      General: There is no distension.      Palpations: Abdomen is soft. There is no mass.      Tenderness: There is no abdominal tenderness.   Skin:     General: Skin is warm and dry.      Coloration: Skin is not pale.   Neurological:      Mental Status: She is alert and oriented to person, place, and time.       Significant Labs: All pertinent lab results from the  last 24 hours have been reviewed.    Assessment and Plan:     PAF (paroxysmal atrial fibrillation)  Pt with symptomatic persistent AF/AFL since July of this year. Presents today for STACEY and PVI.   -No absolute contraindications of esophageal stricture, tumor, perforation, laceration,or diverticulum and/or active GI bleed  -The risks, benefits & alternatives of the procedure were explained to the patient.   -The risks of transesophageal echo include but are not limited to:  Dental trauma, esophageal trauma/perforation, bleeding, laryngospasm/brochospasm, aspiration, sore throat/hoarseness, & dislodgement of the endotracheal tube/nasogastric tube (where applicable).    -The risks of ANES monitored sedation include hypotension, respiratory depression, arrhythmias, bronchospasm, & death.    -Informed consent was obtained. The patient is agreeable to proceed with the procedure and all questions and concerns addressed.    Case discussed with an attending in echocardiography lab.    Further recommendations per attending addendum          VTE Risk Mitigation (From admission, onward)    None          Thank you for your consult. I will sign off. Please contact us if you have any additional questions.    Elizabeth Velásquez PA-C  Cardiology   [facility]

## 2022-11-03 NOTE — PROGRESS NOTES
Dr bowling notified in ep lab by ep lab rn that pt is diabetic.  Md to place sliding scale orders. [facility] rn aware.

## 2022-11-03 NOTE — ASSESSMENT & PLAN NOTE
- RF-PVI/CTI  - STACEY pre-RFA  - PPI x 30 days post-procedure    Anticoagulation: apixiban (dose reduced)  EF (most recent): 60%  AAD/AVN agents: dilt 60 bid, amio 200 qd    The risks, benefits and alternatives of the procedure were explained to the patient, patient's family and/or surrogate decision maker. Risks include (but not limited to) bleeding, hematoma, infection, pain, vascular damage, damage to structures surrounding the vasculature, myocardial damage [perforation, valvular damage], cardiac tamponade, phrenic nerve damage, pulmonary vein stenosis, atrioesophageal (AE) fistula, CVA, MI, and death. Patient is understanding that repeat ablations may be required. All questions were answered. Patient is understanding of these risks, and would like to proceed. Consents signed.

## 2022-11-03 NOTE — SUBJECTIVE & OBJECTIVE
Past Medical History:   Diagnosis Date    Anemia 11/29/2018    Anxiety 11/28/2017    Arthritis     Atrial fibrillation with RVR 10/9/2019    Back pain     Basal cell carcinoma 03/29/2018    left mid back    Chronic diastolic congestive heart failure 10/15/2019    CKD (chronic kidney disease) stage 3, GFR 30-59 ml/min 8/8/2016    Colon polyps     reported per patient.     Coronary artery calcification 10/5/2021    Fuchs' corneal dystrophy     General anesthetics causing adverse effect in therapeutic use     woke up during surgery and gagged on ET tube    Glaucoma     Glaucoma     Heart failure with preserved left ventricular function (HFpEF) 7/24/2018    Hyperlipidemia     Hypertension     Macular degeneration dry    Obesity     PVD (peripheral vascular disease) 4/26/2021    PVD (peripheral vascular disease) 4/26/2021    Squamous cell carcinoma 01/08/2019    left shoulder    Stenosis of carotid artery 10/5/2021    Trouble in sleeping     Type 2 diabetes mellitus without complication, without long-term current use of insulin 2/24/2021    Urinary tract infection        Past Surgical History:   Procedure Laterality Date    ADENOIDECTOMY  1938    BREAST BIOPSY Left     1997. benign    BREAST CYST EXCISION Left 1997 approx    CARPAL TUNNEL RELEASE Right 2012 approx    CATARACT EXTRACTION Bilateral 1994    CHOLECYSTECTOMY  1975    CORNEAL TRANSPLANT Bilateral 08/2015 8/2015 - left; Right 4/2016    EYE SURGERY      Fuch's Corneal dystrophy - had 2nd operation with Dr. Quintanilla    EYE SURGERY      Cataract wIOL    left thumb Left 2011    removed cuboid for arthritis    REVERSE TOTAL SHOULDER ARTHROPLASTY Left 8/21/2018    Procedure: ARTHROPLASTY, SHOULDER, TOTAL, REVERSE;  Surgeon: Solomon Lujan MD;  Location: [facility];  Service: Orthopedics;  Laterality: Left;  WITH BICEP TENODESIS    SKIN CANCER EXCISION Left 2017    BCC removal by Dr. Valadez    SLT- OS (aka TRABECULOPLASTY) Left 05/11/2011    repeat 3/14/12     TENOTOMY Left 8/21/2018    Procedure: TENOTOMY;  Surgeon: Solomon Lujan MD;  Location: [facility] OR;  Service: Orthopedics;  Laterality: Left;    TONSILLECTOMY  1938    TREATMENT OF CARDIAC ARRHYTHMIA N/A 10/10/2019    Procedure: CARDIOVERSION;  Surgeon: Pete Durán MD;  Location: [facility] CATH LAB;  Service: Cardiology;  Laterality: N/A;    TREATMENT OF CARDIAC ARRHYTHMIA N/A 12/6/2019    Procedure: CARDIOVERSION;  Surgeon: Samy Castillo MD;  Location: [facility] CATH LAB;  Service: Cardiology;  Laterality: N/A;       Review of patient's allergies indicates:   Allergen Reactions    Carvedilol Swelling     lips  lips  Other reaction(s): swelling    Cephalexin Other (See Comments)     Muscle/joint weakness unable to move     Doxycycline calcium Other (See Comments)     Weakness nausea   sob  Other reaction(s): weakness, nausea, SOB    Erythromycin Other (See Comments)     Complete weakness/could not walk    Gadolinium-containing contrast media Other (See Comments)     angioedema  Other reaction(s): Angioedema    Hydrocodone-acetaminophen      wheezing  wheezing  wheezing  Other reaction(s): wheezing    Inveltys [loteprednol etabonate] Palpitations and Other (See Comments)     Patient stated bp went up.    Labetalol Shortness Of Breath, Nausea Only and Other (See Comments)     Palpitations, LE weakness  Other reaction(s): SOB, palpitations, weakness    Loratadine Other (See Comments)     Double vision/spots  Other reaction(s): double vision    Macrolide antibiotics Other (See Comments)     Complete weakness/could not walk  Complete weakness/could not walk  Complete weakness/could not walk  Complete weakness/could not walk  Other reaction(s): complete weakness    Moxifloxacin Other (See Comments)     Hives   muscle soreness  Other reaction(s): hives, muscle soreness    Naproxen      wheezing  wheezing  wheezing  Other reaction(s): wheezing    Statins-hmg-coa reductase inhibitors Other (See Comments)     Severe Muscle  weakness  Muscle weakness  Severe Muscle weakness  Other reaction(s): severe muscle weakness    Timolol Other (See Comments)     Severe muscle weakness, eye problems.  Other reaction(s): severe muscle weakness    Verapamil Diarrhea     Severe diarrhea.  Other reaction(s): diarrhea    Amlodipine      Myalgias    Other reaction(s): myalgian    Doxycycline     Doxycycline hyclate      Other reaction(s): weakness, nausea, SOB    Fenofibrate      Causes muscle weakness  Other reaction(s): muscle weakness    Hydralazine      Other reaction(s): muscle tremors    Hydralazine analogues      Muscle tremors/aches      Hydrocortisone     Isosorbide      Tolerates Imdur    Loteprednol      Other reaction(s): increased BP    Tetanus and diphtheria toxoids     Adhesive Rash     Clonidine patch - 2 mfg - contact dermatitis    Codeine Diarrhea and Other (See Comments)     Loss of balance   Other reaction(s): loss of balance    Doxazosin Palpitations     Other reaction(s): palpitations    Fexofenadine Other (See Comments)     Double vision/spots   Other reaction(s): double vision    Hydrocortisone (bulk) Other (See Comments)     Only suppository/ caused muscle weakness    Latanoprost Other (See Comments)     Muscle weakness  Other reaction(s): muscle weakness    Olopatadine Other (See Comments)     Muscle weakness  Other reaction(s): muscle weakness    Prednisone Anxiety       Current Facility-Administered Medications on File Prior to Encounter   Medication    sodium chloride 0.9% flush 3 mL     Current Outpatient Medications on File Prior to Encounter   Medication Sig    acetaminophen (TYLENOL) 500 MG tablet Take 500 mg by mouth daily as needed. Taking 1 to 3    ALPRAZolam (XANAX) 0.25 MG tablet Take 1 tablet (0.25 mg total) by mouth nightly as needed for Insomnia or Anxiety.    amiodarone (PACERONE) 200 MG Tab Take 1 tablet (200 mg total) by mouth every evening. After 4 weeks continue amiodarone 200mg daily    apixaban (ELIQUIS)  2.5 mg Tab Take 1 tablet (2.5 mg total) by mouth 2 (two) times daily.    b complex vitamins capsule Take 1 capsule by mouth once daily.    cholecalciferol, vitamin D3, (VITAMIN D3) 50 mcg (2,000 unit) Cap Take 1 capsule by mouth once daily.    coenzyme Q10 200 mg capsule Take 400 mg by mouth once daily.     diltiaZEM (CARDIZEM) 60 MG tablet Take 1 tablet (60 mg total) by mouth 2 (two) times a day.    fish oil-omega-3 fatty acids 300-1,000 mg capsule Take 2 g by mouth 2 (two) times daily.     losartan (COZAAR) 25 MG tablet Take 1 tablet (25 mg total) by mouth once daily.    metoprolol tartrate (LOPRESSOR) 25 MG tablet Take 1 tablet (25 mg total) by mouth 2 (two) times daily.    POLYETHYLENE GLYCOL 3350 (MIRALAX ORAL) Take 17 g by mouth 2 (two) times a day. Taking BID    RHOPRESSA 0.02 % ophthalmic solution PLACE 1 DROP INTO THE LEFT EYE EVERY EVENING.    [DISCONTINUED] cloNIDine (CATAPRES) 0.1 MG tablet Take 1 tablet (0.1 mg total) by mouth 2 (two) times daily as needed (systolic BP over 180).    [DISCONTINUED] isosorbide mononitrate (IMDUR) 30 MG 24 hr tablet Take 1 tablet (30 mg total) by mouth every evening.     Family History       Problem Relation (Age of Onset)    Cancer Father (88)    Heart disease Mother    Hypertension Mother          Tobacco Use    Smoking status: Never    Smokeless tobacco: Never    Tobacco comments:     history of passive smoking from her ex-   Substance and Sexual Activity    Alcohol use: Yes     Alcohol/week: 2.0 standard drinks     Types: 2 Standard drinks or equivalent per week     Comment: occ    Drug use: No    Sexual activity: Not Currently     Partners: Male     Birth control/protection: Post-menopausal     Comment: discussed protection for STD's     Review of Systems   All other systems reviewed and are negative.  Objective:     Vital Signs (Most Recent):  Temp: 98.8 °F (37.1 °C) (11/03/22 0920)  Pulse: 88 (11/03/22 0920)  Resp: 16 (11/03/22 0920)  BP: (!) 146/82 (11/03/22  0925)  SpO2: (!) 93 % (11/03/22 0920)   Vital Signs (24h Range):  Temp:  [98.8 °F (37.1 °C)] 98.8 °F (37.1 °C)  Pulse:  [88] 88  Resp:  [16] 16  SpO2:  [93 %] 93 %  BP: (146)/(82) 146/82       Weight: 81.6 kg (180 lb)  Body mass index is 30.9 kg/m².    SpO2: (!) 93 %  O2 Device (Oxygen Therapy): room air    Physical Exam  General: NAD. AAO  HENT: EOMI  Neck: supple. No JVD  CV: irregularly irregular. Normal S1/S2. No gallops, rubs, or murmurs. 2+ radial pulses  Resp: CTAB. No increased work of breathing  Ext: warm. No edema.      Significant Labs: All pertinent lab results from the last 24 hours have been reviewed.    Significant Imaging:  Reviewed

## 2022-11-03 NOTE — ASSESSMENT & PLAN NOTE
Pt with symptomatic persistent AF/AFL since July of this year. Presents today for STACEY and PVI.   -No absolute contraindications of esophageal stricture, tumor, perforation, laceration,or diverticulum and/or active GI bleed  -The risks, benefits & alternatives of the procedure were explained to the patient.   -The risks of transesophageal echo include but are not limited to:  Dental trauma, esophageal trauma/perforation, bleeding, laryngospasm/brochospasm, aspiration, sore throat/hoarseness, & dislodgement of the endotracheal tube/nasogastric tube (where applicable).    -The risks of ANES monitored sedation include hypotension, respiratory depression, arrhythmias, bronchospasm, & death.    -Informed consent was obtained. The patient is agreeable to proceed with the procedure and all questions and concerns addressed.    Case discussed with an attending in echocardiography lab.    Further recommendations per attending addendum

## 2022-11-03 NOTE — HPI
89 yoF here for CTI/PVI RFA. Off amio and cardizem x 4 days. Last dose of Eliquis was last night. ECG consistent with AF.     Most recent EP visit:  NSR on recent ECGs up until 6/15/22. AFL and AF on ECGs afterwards starting 7/11/22. Event monitor with 100% AF, controlled average V rate. Normal EF. She had AF after a new BP agent 10/19. Her symptoms are mild. She has tried amiodarone in the past which caused side effects. She has history of CAD and has QTc 450s.      Echo 7/12/22:  ·           Concentric remodeling and normal systolic function.  ·           The estimated ejection fraction is 60%.  ·           Normal left ventricular diastolic function.  ·           Normal right ventricular size with normal right ventricular systolic function.     Event monitor 7/22:  - Heart rates varied between 41 and 123 BPM with an average of 77 BPM.  - Predominant rhythm: atrial fibrillation   - Atrial Fibrillation (AF) 100.0 %  - PVC 0.48 %

## 2022-11-03 NOTE — PLAN OF CARE
Patient arrived to room. PIV placed.  Admit assessment completed. Plan of care discussed with patient. Will monitor. Call light within reach, instructed in use.   POC

## 2022-11-03 NOTE — Clinical Note
Manual pressure was applied to the right femoral vein sheath insertion site. Bilateral groins sutured x 1

## 2022-11-03 NOTE — ANESTHESIA PROCEDURE NOTES
Intubation    Date/Time: 11/3/2022 10:34 AM  Performed by: Riddhi Adorno CRNA  Authorized by: MARLENI Sanchez MD     Intubation:     Induction:  Intravenous    Intubated:  Postinduction    Mask Ventilation:  Easy with oral airway    Attempts:  1    Attempted By:  CRNA    Method of Intubation:  Video laryngoscopy    Blade:  Dial 3    Laryngeal View Grade: Grade IIb - only the arytenoids and epiglottis seen      Difficult Airway Encountered?: No      Complications:  None    Airway Device:  Oral endotracheal tube    Airway Device Size:  7.0    Style/Cuff Inflation:  Cuffed (inflated to minimal occlusive pressure)    Tube secured:  21    Secured at:  The lips    Placement Verified By:  Capnometry    Complicating Factors:  None    Findings Post-Intubation:  BS equal bilateral

## 2022-11-04 VITALS
HEIGHT: 64 IN | SYSTOLIC BLOOD PRESSURE: 133 MMHG | DIASTOLIC BLOOD PRESSURE: 63 MMHG | BODY MASS INDEX: 30.73 KG/M2 | HEART RATE: 62 BPM | WEIGHT: 180 LBS | TEMPERATURE: 98 F | RESPIRATION RATE: 16 BRPM | OXYGEN SATURATION: 93 %

## 2022-11-04 LAB
POC ACTIVATED CLOTTING TIME K: 138 SEC (ref 74–137)
POC ACTIVATED CLOTTING TIME K: 144 SEC (ref 74–137)
POC ACTIVATED CLOTTING TIME K: 347 SEC (ref 74–137)
POC ACTIVATED CLOTTING TIME K: 347 SEC (ref 74–137)
POC ACTIVATED CLOTTING TIME K: 353 SEC (ref 74–137)
SAMPLE: ABNORMAL

## 2022-11-04 PROCEDURE — 36620 INSERTION CATHETER ARTERY: CPT | Mod: HCNC,59,, | Performed by: ANESTHESIOLOGY

## 2022-11-04 PROCEDURE — 36620 ARTERIAL: ICD-10-PCS | Mod: HCNC,59,, | Performed by: ANESTHESIOLOGY

## 2022-11-04 PROCEDURE — 25000003 PHARM REV CODE 250: Mod: HCNC | Performed by: INTERNAL MEDICINE

## 2022-11-04 RX ORDER — PANTOPRAZOLE SODIUM 40 MG/1
40 TABLET, DELAYED RELEASE ORAL DAILY
Qty: 30 TABLET | Refills: 0 | Status: SHIPPED | OUTPATIENT
Start: 2022-11-04 | End: 2022-11-10

## 2022-11-04 RX ADMIN — APIXABAN 2.5 MG: 2.5 TABLET, FILM COATED ORAL at 09:11

## 2022-11-04 NOTE — ADDENDUM NOTE
Addended by: MANASA RICHARD on: 1/4/2017 09:28 AM     Modules accepted: Orders     Area H Indication Text: Tumors in this location are included in Area H (eyelids, eyebrows, nose, lips, chin, ear, pre-auricular, post-auricular, temple, genitalia, hands, feet, ankles and areola).  Tissue conservation is critical in these anatomic locations.

## 2022-11-04 NOTE — PLAN OF CARE
Patient remained free from falls and injury throughout shift. No acute events overnight. Patient alert and oriented x4; vital signs stable. Safety measures addressed --bed locked and in low position with siderails up x2 and call light/personal items within reach. Family member at bedside. Bilateral groin sites soft with gauze and tegaderm dressing CDI. Patient has voided post yang removal. Ambulating independently with assistive device. Plan of care reviewed with patient; all questions and concerns addressed. Patient verbalizes understanding. Will continue to monitor.

## 2022-11-04 NOTE — ANESTHESIA PROCEDURE NOTES
Arterial    Diagnosis: atrial fibrillation    Patient location during procedure: done in OR  Procedure start time: 11/3/2022 10:38 AM  Timeout: 11/3/2022 10:37 AM  Procedure end time: 11/3/2022 10:42 AM    Staffing  Authorizing Provider: MARLENI Sanchez MD  Performing Provider: MARLENI Sanchez MD    Anesthesiologist was present at the time of the procedure.  Arterial  Skin Prep: chlorhexidine gluconate and isopropyl alcohol  Local Infiltration: none  Orientation: right  Location: radial    Catheter Size: 20 G  Catheter placement by Anatomical landmarks. Heme positive aspiration all ports. Insertion Attempts: 1  Assessment  Dressing: secured with tape and tegaderm  Patient: Tolerated well

## 2022-11-04 NOTE — DISCHARGE SUMMARY
[facility] - Cardiology Stepdown  Cardiac Electrophysiology  Discharge Summary      Patient Name: Shirley Metz  MRN: [id number]  Admission Date: 11/3/2022  Hospital Length of Stay: 0 days  Discharge Date and Time:  11/04/2022 5:42 PM  Attending Physician: No att. providers found    Discharging Provider: Dilan Drummond MD  Primary Care Physician: Heather Miller NP    HPI:   89 yoF here for CTI/PVI RFA. Off amio and cardizem x 4 days. Last dose of Eliquis was last night. ECG consistent with AF.     Most recent EP visit:  NSR on recent ECGs up until 6/15/22. AFL and AF on ECGs afterwards starting 7/11/22. Event monitor with 100% AF, controlled average V rate. Normal EF. She had AF after a new BP agent 10/19. Her symptoms are mild. She has tried amiodarone in the past which caused side effects. She has history of CAD and has QTc 450s.      Echo 7/12/22:  ·           Concentric remodeling and normal systolic function.  ·           The estimated ejection fraction is 60%.  ·           Normal left ventricular diastolic function.  ·           Normal right ventricular size with normal right ventricular systolic function.     Event monitor 7/22:  - Heart rates varied between 41 and 123 BPM with an average of 77 BPM.  - Predominant rhythm: atrial fibrillation   - Atrial Fibrillation (AF) 100.0 %  - PVC 0.48 %      Procedure(s) (LRB):  Ablation atrial fibrillation (N/A)  ABLATION, ATRIAL FLUTTER, TYPICAL (N/A)  Transesophageal echo (STACEY) intra-procedure log documentation (N/A)     Indwelling Lines/Drains at time of discharge:  Lines/Drains/Airways     Arterial Line  Duration           Arterial Line 11/03/22 1038 Right Radial 1 day                Hospital Course:  Successful CTI/PVI RFA without complications. She was discharged off of her home amio and CCB. She will continue her beta blocker. PPI x 30 days.       Goals of Care Treatment Preferences:  Code Status: Full Code      Consults:     Significant Diagnostic  Studies: Labs: All labs within the past 24 hours have been reviewed    Pending Diagnostic Studies:     None          Final Active Diagnoses:    Diagnosis Date Noted POA    PAF (paroxysmal atrial fibrillation) [I48.0] 12/06/2019 Yes     Chronic      Problems Resolved During this Admission:     No new Assessment & Plan notes have been filed under this hospital service since the last note was generated.  Service: Arrhythmia      Discharged Condition: stable    Disposition: Home or Self Care    Follow Up:   Follow-up Information     Toi Kelly MD Follow up in 4 month(s).    Specialties: Electrophysiology, Cardiovascular Disease  Contact information:  [address]  [facility] [address]  [phone]                       Patient Instructions:   No discharge procedures on file.  Medications:  Reconciled Home Medications:      Medication List      START taking these medications    pantoprazole 40 MG tablet  Commonly known as: PROTONIX  Take 1 tablet (40 mg total) by mouth once daily.        CONTINUE taking these medications    acetaminophen 500 MG tablet  Commonly known as: TYLENOL  Take 500 mg by mouth daily as needed. Taking 1 to 3     ALPRAZolam 0.25 MG tablet  Commonly known as: XANAX  Take 1 tablet (0.25 mg total) by mouth nightly as needed for Insomnia or Anxiety.     apixaban 2.5 mg Tab  Commonly known as: ELIQUIS  Take 1 tablet (2.5 mg total) by mouth 2 (two) times daily.     b complex vitamins capsule  Take 1 capsule by mouth once daily.     cholecalciferol (vitamin D3) 50 mcg (2,000 unit) Cap capsule  Commonly known as: VITAMIN D3  Take 1 capsule by mouth once daily.     coenzyme Q10 200 mg capsule  Take 400 mg by mouth once daily.     fish oil-omega-3 fatty acids 300-1,000 mg capsule  Take 2 g by mouth 2 (two) times daily.     furosemide 40 MG tablet  Commonly known as: LASIX  Take 1 tablet (40 mg total) by mouth once daily. Alternate - 40mg twice a day and 40mg daily     losartan 25 MG  tablet  Commonly known as: COZAAR  Take 1 tablet (25 mg total) by mouth once daily.     metoprolol tartrate 25 MG tablet  Commonly known as: LOPRESSOR  Take 1 tablet (25 mg total) by mouth 2 (two) times daily.     MIRALAX ORAL  Take 17 g by mouth 2 (two) times a day. Taking BID     RHOPRESSA 0.02 % ophthalmic solution  Generic drug: netarsudiL  PLACE 1 DROP INTO THE LEFT EYE EVERY EVENING.     timolol maleate 0.5% 0.5 % Drop  Commonly known as: TIMOPTIC  Place 1 drop into the left eye every morning.     travoprost 0.004 % ophthalmic solution  Commonly known as: TRAVATAN Z  Place 1 drop into the left eye every evening.        STOP taking these medications    amiodarone 200 MG Tab  Commonly known as: PACERONE     diltiaZEM 60 MG tablet  Commonly known as: CARDIZEM            Time spent on the discharge of patient: 15 minutes    Dilan Drummond MD  Cardiac Electrophysiology  [facility] - Cardiology Stepdown

## 2022-11-04 NOTE — HOSPITAL COURSE
Successful CTI/PVI RFA without complications. She was discharged off of her home amio and CCB. She will continue her beta blocker. PPI x 30 days.

## 2022-11-04 NOTE — DISCHARGE INSTRUCTIONS
"Make sure to continue taking your blood thinner apixiban (trade name: Eliquis) after your procedure as you would normally take it  Take pantoprazole (trade name: Protonix) nightly for at least 30 days after your procedure. This is to protect your esophagus during the post-operative period.  You may experience chest discomfort (also known as "pericarditis") with deep breathes or coughing which is normal following your procedure. If this occurs, you can take ibuprofen (Motrin) 800 mg every 8 hours for 2-3 days. If the chest pain is persistent or severe please visit the nearest emergency department.  You can take furosemide (trade name: Lasix) daily or twice daily as needed for fluid retention following your ablation  Do not take baths or submerge your groin area or at least 1 week or when the puncture sites in your groin have completely healed  Do not lift anything over 10 lbs for the first week after your procedure, and avoid strenuous activity during this time to allow for the groin sites to heal. After 1 week, there are no activity restrictions.  Please contact the electrophysiology clinic or go to the ER if you experience: severe chest pain, shortness of breath, bleeding or swelling of the groin sites, or any other concerns.   "

## 2022-11-04 NOTE — ANESTHESIA POSTPROCEDURE EVALUATION
Anesthesia Post Evaluation    Patient: Shirley Metz    Procedure(s) Performed: Procedure(s) (LRB):  Ablation atrial fibrillation (N/A)  ABLATION, ATRIAL FLUTTER, TYPICAL (N/A)  Transesophageal echo (STACEY) intra-procedure log documentation (N/A)    Final Anesthesia Type: general      Patient location during evaluation: PACU  Patient participation: Yes- Able to Participate  Level of consciousness: awake and alert  Post-procedure vital signs: reviewed and stable  Pain management: adequate  Airway patency: patent    PONV status at discharge: No PONV  Anesthetic complications: no      Cardiovascular status: blood pressure returned to baseline  Respiratory status: unassisted, spontaneous ventilation and room air  Hydration status: euvolemic            Vitals Value   /63   Temp 36.4 °C (97.5 °F)   Pulse 62   Resp 16   SpO2 93 %         No case tracking events are documented in the log.      Pain/Beata Score:   Beata Score: 9 (11/3/2022  3:00 PM)

## 2022-11-05 ENCOUNTER — NURSE TRIAGE (OUTPATIENT)
Dept: ADMINISTRATIVE | Facility: CLINIC | Age: 87
End: 2022-11-05
Payer: MEDICARE

## 2022-11-05 ENCOUNTER — HOSPITAL ENCOUNTER (EMERGENCY)
Facility: HOSPITAL | Age: 87
Discharge: HOME | End: 2022-11-05
Attending: EMERGENCY MEDICINE
Payer: MEDICARE

## 2022-11-05 VITALS
HEART RATE: 59 BPM | TEMPERATURE: 99 F | WEIGHT: 180 LBS | DIASTOLIC BLOOD PRESSURE: 64 MMHG | HEIGHT: 64 IN | BODY MASS INDEX: 30.73 KG/M2 | RESPIRATION RATE: 20 BRPM | SYSTOLIC BLOOD PRESSURE: 124 MMHG | OXYGEN SATURATION: 96 %

## 2022-11-05 DIAGNOSIS — R07.9 CHEST PAIN: ICD-10-CM

## 2022-11-05 DIAGNOSIS — S00.511A ABRASION OF LIP, INITIAL ENCOUNTER: ICD-10-CM

## 2022-11-05 DIAGNOSIS — R51.9 NONINTRACTABLE HEADACHE, UNSPECIFIED CHRONICITY PATTERN, UNSPECIFIED HEADACHE TYPE: ICD-10-CM

## 2022-11-05 DIAGNOSIS — R53.1 WEAKNESS: Primary | ICD-10-CM

## 2022-11-05 LAB
ALBUMIN SERPL BCP-MCNC: 3.9 G/DL (ref 3.5–5.2)
ALP SERPL-CCNC: 59 U/L (ref 55–135)
ALT SERPL W/O P-5'-P-CCNC: 22 U/L (ref 10–44)
ANION GAP SERPL CALC-SCNC: 17 MMOL/L (ref 8–16)
AST SERPL-CCNC: 30 U/L (ref 10–40)
BASOPHILS # BLD AUTO: 0.03 K/UL (ref 0–0.2)
BASOPHILS NFR BLD: 0.3 % (ref 0–1.9)
BILIRUB SERPL-MCNC: 0.5 MG/DL (ref 0.1–1)
BNP SERPL-MCNC: 210 PG/ML (ref 0–99)
BUN SERPL-MCNC: 43 MG/DL (ref 8–23)
CALCIUM SERPL-MCNC: 9.1 MG/DL (ref 8.7–10.5)
CHLORIDE SERPL-SCNC: 103 MMOL/L (ref 95–110)
CO2 SERPL-SCNC: 17 MMOL/L (ref 23–29)
CREAT SERPL-MCNC: 1.9 MG/DL (ref 0.5–1.4)
DIFFERENTIAL METHOD: ABNORMAL
EOSINOPHIL # BLD AUTO: 0.1 K/UL (ref 0–0.5)
EOSINOPHIL NFR BLD: 0.4 % (ref 0–8)
ERYTHROCYTE [DISTWIDTH] IN BLOOD BY AUTOMATED COUNT: 15.8 % (ref 11.5–14.5)
EST. GFR  (NO RACE VARIABLE): 25 ML/MIN/1.73 M^2
GLUCOSE SERPL-MCNC: 109 MG/DL (ref 70–110)
HCT VFR BLD AUTO: 37.8 % (ref 37–48.5)
HGB BLD-MCNC: 12.3 G/DL (ref 12–16)
IMM GRANULOCYTES # BLD AUTO: 0.05 K/UL (ref 0–0.04)
IMM GRANULOCYTES NFR BLD AUTO: 0.4 % (ref 0–0.5)
INFLUENZA A, MOLECULAR: NEGATIVE
INFLUENZA B, MOLECULAR: NEGATIVE
LYMPHOCYTES # BLD AUTO: 1.5 K/UL (ref 1–4.8)
LYMPHOCYTES NFR BLD: 12.5 % (ref 18–48)
MCH RBC QN AUTO: 32.4 PG (ref 27–31)
MCHC RBC AUTO-ENTMCNC: 32.5 G/DL (ref 32–36)
MCV RBC AUTO: 100 FL (ref 82–98)
MONOCYTES # BLD AUTO: 1.1 K/UL (ref 0.3–1)
MONOCYTES NFR BLD: 9.7 % (ref 4–15)
NEUTROPHILS # BLD AUTO: 8.9 K/UL (ref 1.8–7.7)
NEUTROPHILS NFR BLD: 76.7 % (ref 38–73)
NRBC BLD-RTO: 0 /100 WBC
PLATELET # BLD AUTO: 175 K/UL (ref 150–450)
PMV BLD AUTO: 9.6 FL (ref 9.2–12.9)
POTASSIUM SERPL-SCNC: 4.2 MMOL/L (ref 3.5–5.1)
PROT SERPL-MCNC: 7.8 G/DL (ref 6–8.4)
RBC # BLD AUTO: 3.8 M/UL (ref 4–5.4)
SARS-COV-2 RDRP RESP QL NAA+PROBE: NEGATIVE
SODIUM SERPL-SCNC: 137 MMOL/L (ref 136–145)
SPECIMEN SOURCE: NORMAL
TROPONIN I SERPL DL<=0.01 NG/ML-MCNC: 1.47 NG/ML (ref 0–0.03)
TROPONIN I SERPL DL<=0.01 NG/ML-MCNC: 1.59 NG/ML (ref 0–0.03)
WBC # BLD AUTO: 11.56 K/UL (ref 3.9–12.7)

## 2022-11-05 PROCEDURE — 99285 EMERGENCY DEPT VISIT HI MDM: CPT | Mod: 25

## 2022-11-05 PROCEDURE — 80053 COMPREHEN METABOLIC PANEL: CPT | Mod: HCNC | Performed by: EMERGENCY MEDICINE

## 2022-11-05 PROCEDURE — 84484 ASSAY OF TROPONIN QUANT: CPT | Mod: 91,HCNC | Performed by: EMERGENCY MEDICINE

## 2022-11-05 PROCEDURE — 83880 ASSAY OF NATRIURETIC PEPTIDE: CPT | Mod: HCNC | Performed by: EMERGENCY MEDICINE

## 2022-11-05 PROCEDURE — U0002 COVID-19 LAB TEST NON-CDC: HCPCS | Mod: HCNC | Performed by: EMERGENCY MEDICINE

## 2022-11-05 PROCEDURE — 85025 COMPLETE CBC W/AUTO DIFF WBC: CPT | Mod: HCNC | Performed by: EMERGENCY MEDICINE

## 2022-11-05 PROCEDURE — 93010 ELECTROCARDIOGRAM REPORT: CPT | Mod: HCNC,,, | Performed by: INTERNAL MEDICINE

## 2022-11-05 PROCEDURE — 87502 INFLUENZA DNA AMP PROBE: CPT | Mod: HCNC | Performed by: EMERGENCY MEDICINE

## 2022-11-05 PROCEDURE — 93010 EKG 12-LEAD: ICD-10-PCS | Mod: HCNC,,, | Performed by: INTERNAL MEDICINE

## 2022-11-05 PROCEDURE — 25000003 PHARM REV CODE 250: Mod: HCNC | Performed by: EMERGENCY MEDICINE

## 2022-11-05 PROCEDURE — 93005 ELECTROCARDIOGRAM TRACING: CPT | Mod: HCNC

## 2022-11-05 RX ADMIN — Medication 1 ML: at 03:11

## 2022-11-05 NOTE — DISCHARGE INSTRUCTIONS
You likely feeling ill effects of her recent surgery.  Continue all home medications as before.  Her troponin test was elevated but trending down was as it should.  Follow up with her doctor on Monday for re-evaluation.  Return as needed for any worsening symptoms, problems, questions concerns.  Use Orajel topically over-the-counter for pain to her lip.

## 2022-11-05 NOTE — TELEPHONE ENCOUNTER
[facility] outgoing call -       Pt c/o on thur had operation for afib, lips swollen during operation, very sore, 5x normal, blistering, possible trauma during operation, sore, not getting any better, skin keeps coming off in blisters, afebile . Lip swelling protocol followed and pt advised to see a doctor within 24 hours or use a telemed doc or go to an [facility]/er. Education provided see care advice and invited Pt to call [facility] at 1 [phone] if she gets any worse or has any new questions or symptoms but is to see a doctor within 24 hours. Pt declined setting appointment with her pcp at this time. [facility] offered and declined. Encounter routed to PCP/staff as high priority.   Reason for Disposition   Swelling is painful to touch    Additional Information   Negative: Unresponsive, passed out or very weak   Negative: Swollen tongue   Negative: Difficulty breathing or wheezing   Negative: [1] Life-threatening reaction in the past to similar substance (e.g., food, insect bite/sting, chemical, etc.) AND [2] < 2 hours since exposure   Negative: Sounds like a life-threatening emergency to the triager   Negative: Entire face is swollen   Negative: Taking an ACE Inhibitor medication (e.g., benazepril/LOTENSIN, captopril/CAPOTEN, enalapril/VASOTEC, lisinopril/ZESTRIL)   Negative: [1] Severe swelling AND [2] cause unknown   Negative: Patient sounds very sick or weak to the triager   Negative: [1] Swelling is red AND [2] fever   Negative: [1] Swelling is painful to touch AND [2] fever   Negative: [1] Looks infected AND [2] large red area (> 2 in. or 5 cm)   Negative: Toothache    Protocols used: Lip Swelling-A-

## 2022-11-05 NOTE — ED PROVIDER NOTES
Encounter Date: 11/5/2022       History     Chief Complaint   Patient presents with    Headache    Weakness     Pt had an ablation done yesterday and started with a HA and generalized weakness this morning. Pt also bradycardic, denies CP, SOB     HPI  89-year-old white female status post ablation yesterday at [place] by Dr. Salmon presenting today upon awakening with generalized weakness and mild headache. The patient awakened and checked her pulse and noted it was 45.  She had previously been on amiodarone and Cardizem of these were discontinued yesterday.  She denies any chest pain but notes she became dizzy when she tried to stand.  She was started on Lasix yesterday.  She denies any chest pain palpitations shortness of breath abdominal pain nausea vomiting or diarrhea.  She denies any fevers or chills.  There is no headache. she denies any focal lateralizing weakness.    Review of patient's allergies indicates:   Allergen Reactions    Carvedilol Swelling     lips  lips  Other reaction(s): swelling    Cephalexin Other (See Comments)     Muscle/joint weakness unable to move     Doxycycline calcium Other (See Comments)     Weakness nausea   sob  Other reaction(s): weakness, nausea, SOB    Erythromycin Other (See Comments)     Complete weakness/could not walk    Gadolinium-containing contrast media Other (See Comments)     angioedema  Other reaction(s): Angioedema    Hydrocodone-acetaminophen      wheezing  wheezing  wheezing  Other reaction(s): wheezing    Inveltys [loteprednol etabonate] Palpitations and Other (See Comments)     Patient stated bp went up.    Labetalol Shortness Of Breath, Nausea Only and Other (See Comments)     Palpitations, LE weakness  Other reaction(s): SOB, palpitations, weakness    Loratadine Other (See Comments)     Double vision/spots  Other reaction(s): double vision    Macrolide antibiotics Other (See Comments)     Complete weakness/could not walk  Complete weakness/could not  walk  Complete weakness/could not walk  Complete weakness/could not walk  Other reaction(s): complete weakness    Moxifloxacin Other (See Comments)     Hives   muscle soreness  Other reaction(s): hives, muscle soreness    Naproxen      wheezing  wheezing  wheezing  Other reaction(s): wheezing    Statins-hmg-coa reductase inhibitors Other (See Comments)     Severe Muscle weakness  Muscle weakness  Severe Muscle weakness  Other reaction(s): severe muscle weakness    Timolol Other (See Comments)     Severe muscle weakness, eye problems.  Other reaction(s): severe muscle weakness    Verapamil Diarrhea     Severe diarrhea.  Other reaction(s): diarrhea    Amlodipine      Myalgias    Other reaction(s): myalgian    Doxycycline     Doxycycline hyclate      Other reaction(s): weakness, nausea, SOB    Fenofibrate      Causes muscle weakness  Other reaction(s): muscle weakness    Hydralazine      Other reaction(s): muscle tremors    Hydralazine analogues      Muscle tremors/aches      Hydrocortisone     Isosorbide      Tolerates Imdur    Loteprednol      Other reaction(s): increased BP    Tetanus and diphtheria toxoids     Adhesive Rash     Clonidine patch - 2 mfg - contact dermatitis    Codeine Diarrhea and Other (See Comments)     Loss of balance   Other reaction(s): loss of balance    Doxazosin Palpitations     Other reaction(s): palpitations    Fexofenadine Other (See Comments)     Double vision/spots   Other reaction(s): double vision    Hydrocortisone (bulk) Other (See Comments)     Only suppository/ caused muscle weakness    Latanoprost Other (See Comments)     Muscle weakness  Other reaction(s): muscle weakness    Olopatadine Other (See Comments)     Muscle weakness  Other reaction(s): muscle weakness    Prednisone Anxiety     Past Medical History:   Diagnosis Date    Anemia 11/29/2018    Anxiety 11/28/2017    Arthritis     Atrial fibrillation with RVR 10/9/2019    Back pain     Basal cell carcinoma 03/29/2018    left  mid back    Chronic diastolic congestive heart failure 10/15/2019    CKD (chronic kidney disease) stage 3, GFR 30-59 ml/min 8/8/2016    Colon polyps     reported per patient.     Coronary artery calcification 10/5/2021    Fuchs' corneal dystrophy     General anesthetics causing adverse effect in therapeutic use     woke up during surgery and gagged on ET tube    Glaucoma     Glaucoma     Heart failure with preserved left ventricular function (HFpEF) 7/24/2018    Hyperlipidemia     Hypertension     Macular degeneration dry    Obesity     PVD (peripheral vascular disease) 4/26/2021    PVD (peripheral vascular disease) 4/26/2021    Squamous cell carcinoma 01/08/2019    left shoulder    Stenosis of carotid artery 10/5/2021    Trouble in sleeping     Type 2 diabetes mellitus without complication, without long-term current use of insulin 2/24/2021    Urinary tract infection      Past Surgical History:   Procedure Laterality Date    ABLATION OF ARRHYTHMOGENIC FOCUS FOR ATRIAL FIBRILLATION N/A 11/3/2022    Procedure: Ablation atrial fibrillation;  Surgeon: Toi Kelly MD;  Location: [facility] EP LAB;  Service: Cardiology;  Laterality: N/A;  afib, PVI/CTI, RFA, STACEY (cx if SR), DEDE, anes, MB, 3 Prep    ADENOIDECTOMY  1938    BREAST BIOPSY Left     1997. benign    BREAST CYST EXCISION Left 1997 approx    CARPAL TUNNEL RELEASE Right 2012 approx    CATARACT EXTRACTION Bilateral 1994    CHOLECYSTECTOMY  1975    CORNEAL TRANSPLANT Bilateral 08/2015 8/2015 - left; Right 4/2016    EYE SURGERY      Fuch's Corneal dystrophy - had 2nd operation with Dr. Quintanilla    EYE SURGERY      Cataract wIOL    left thumb Left 2011    removed cuboid for arthritis    REVERSE TOTAL SHOULDER ARTHROPLASTY Left 8/21/2018    Procedure: ARTHROPLASTY, SHOULDER, TOTAL, REVERSE;  Surgeon: Solomon Lujan MD;  Location: [facility];  Service: Orthopedics;  Laterality: Left;  WITH BICEP TENODESIS    SKIN CANCER EXCISION Left 2017    BCC removal by Dr. Valadez     SLT- OS (aka TRABECULOPLASTY) Left 05/11/2011    repeat 3/14/12    TENOTOMY Left 8/21/2018    Procedure: TENOTOMY;  Surgeon: Solomon Lujan MD;  Location: [facility] OR;  Service: Orthopedics;  Laterality: Left;    TONSILLECTOMY  1938    TREATMENT OF CARDIAC ARRHYTHMIA N/A 10/10/2019    Procedure: CARDIOVERSION;  Surgeon: Pete Durán MD;  Location: [facility] CATH LAB;  Service: Cardiology;  Laterality: N/A;    TREATMENT OF CARDIAC ARRHYTHMIA N/A 12/6/2019    Procedure: CARDIOVERSION;  Surgeon: Samy Castillo MD;  Location: [facility] CATH LAB;  Service: Cardiology;  Laterality: N/A;     Family History   Problem Relation Age of Onset    Heart disease Mother     Hypertension Mother     Cancer Father 88        sarcoma( ?[place]'s ) on leg    Deep vein thrombosis Neg Hx     Ovarian cancer Neg Hx     Breast cancer Neg Hx     Kidney disease Neg Hx     Strabismus Neg Hx     Retinal detachment Neg Hx     Macular degeneration Neg Hx     Glaucoma Neg Hx     Blindness Neg Hx     Amblyopia Neg Hx     Eczema Neg Hx     Lupus Neg Hx     Melanoma Neg Hx     Psoriasis Neg Hx     Diabetes Neg Hx     Stroke Neg Hx     Mental retardation Neg Hx     Mental illness Neg Hx     Hyperlipidemia Neg Hx     COPD Neg Hx     Asthma Neg Hx     Depression Neg Hx     Alcohol abuse Neg Hx     Drug abuse Neg Hx      Social History     Tobacco Use    Smoking status: Never    Smokeless tobacco: Never    Tobacco comments:     history of passive smoking from her ex-   Substance Use Topics    Alcohol use: Yes     Alcohol/week: 2.0 standard drinks     Types: 2 Standard drinks or equivalent per week     Comment: occ    Drug use: No     Review of Systems   Constitutional:  Positive for fatigue. Negative for chills and fever.   HENT:  Negative for congestion and rhinorrhea.    Respiratory:  Positive for cough. Negative for chest tightness and shortness of breath.    Cardiovascular:  Negative for chest pain, palpitations and leg swelling.   Gastrointestinal:   Negative for abdominal pain, diarrhea, nausea and vomiting.   Genitourinary:  Negative for dysuria and urgency.   Musculoskeletal:  Negative for arthralgias and myalgias.   Neurological:  Positive for dizziness and light-headedness.     Physical Exam     Initial Vitals [11/05/22 1212]   BP Pulse Resp Temp SpO2   (!) 144/72 (!) 56 18 98.8 °F (37.1 °C) 95 %      MAP       --         Physical Exam  Nursing Notes and Vital Signs Reviewed.  Constitutional: Patient is in no acute distress. Well-developed and well-nourished.  Head: Atraumatic. Normocephalic.  Eyes:  EOM intact.  No scleral icterus.  ENT: Mucous membranes are moist.  Nares clear   Neck:  Full ROM. No JVD.  Cardiovascular: Regular rate. Regular rhythm No murmurs, rubs, or gallops. Distal pulses are 2+ and symmetric  Pulmonary/Chest: No respiratory distress. Clear to auscultation bilaterally. No wheezing or rales.  Equal chest wall rise bilaterally  Abdominal: Soft and non-distended.  There is no tenderness.  No rebound, guarding, or rigidity. Good bowel sounds.  Genitourinary: No CVA tenderness.  No suprapubic tenderness  Musculoskeletal: Moves all extremities. No obvious deformities.  5 x 5 strength in all extremities   Skin: Warm and dry.  Neurological:  Alert, awake, and appropriate.  Normal speech.  No acute focal neurological deficits are appreciated.  Two through 12 intact bilaterally.  Psychiatric: Normal affect. Good eye contact. Appropriate in content.    ED Course   Procedures  Labs Reviewed   CBC W/ AUTO DIFFERENTIAL - Abnormal; Notable for the following components:       Result Value    RBC 3.80 (*)      (*)     MCH 32.4 (*)     RDW 15.8 (*)     Gran # (ANC) 8.9 (*)     Immature Grans (Abs) 0.05 (*)     Mono # 1.1 (*)     Gran % 76.7 (*)     Lymph % 12.5 (*)     All other components within normal limits   COMPREHENSIVE METABOLIC PANEL - Abnormal; Notable for the following components:    CO2 17 (*)     BUN 43 (*)     Creatinine 1.9 (*)      Anion Gap 17 (*)     eGFR 25 (*)     All other components within normal limits   TROPONIN I - Abnormal; Notable for the following components:    Troponin I 1.595 (*)     All other components within normal limits   TROPONIN I - Abnormal; Notable for the following components:    Troponin I 1.472 (*)     All other components within normal limits   B-TYPE NATRIURETIC PEPTIDE - Abnormal; Notable for the following components:     (*)     All other components within normal limits   INFLUENZA A & B BY MOLECULAR   SARS-COV-2 RNA AMPLIFICATION, QUAL     Results for orders placed or performed during the hospital encounter of 11/05/22   Influenza A & B by Molecular    Specimen: Nasopharyngeal Swab   Result Value Ref Range    Influenza A, Molecular Negative Negative    Influenza B, Molecular Negative Negative    Flu A & B Source Nasal swab    CBC auto differential   Result Value Ref Range    WBC 11.56 3.90 - 12.70 K/uL    RBC 3.80 (L) 4.00 - 5.40 M/uL    Hemoglobin 12.3 12.0 - 16.0 g/dL    Hematocrit 37.8 37.0 - 48.5 %     (H) 82 - 98 fL    MCH 32.4 (H) 27.0 - 31.0 pg    MCHC 32.5 32.0 - 36.0 g/dL    RDW 15.8 (H) 11.5 - 14.5 %    Platelets 175 150 - 450 K/uL    MPV 9.6 9.2 - 12.9 fL    Immature Granulocytes 0.4 0.0 - 0.5 %    Gran # (ANC) 8.9 (H) 1.8 - 7.7 K/uL    Immature Grans (Abs) 0.05 (H) 0.00 - 0.04 K/uL    Lymph # 1.5 1.0 - 4.8 K/uL    Mono # 1.1 (H) 0.3 - 1.0 K/uL    Eos # 0.1 0.0 - 0.5 K/uL    Baso # 0.03 0.00 - 0.20 K/uL    nRBC 0 0 /100 WBC    Gran % 76.7 (H) 38.0 - 73.0 %    Lymph % 12.5 (L) 18.0 - 48.0 %    Mono % 9.7 4.0 - 15.0 %    Eosinophil % 0.4 0.0 - 8.0 %    Basophil % 0.3 0.0 - 1.9 %    Differential Method Automated    Comprehensive metabolic panel   Result Value Ref Range    Sodium 137 136 - 145 mmol/L    Potassium 4.2 3.5 - 5.1 mmol/L    Chloride 103 95 - 110 mmol/L    CO2 17 (L) 23 - 29 mmol/L    Glucose 109 70 - 110 mg/dL    BUN 43 (H) 8 - 23 mg/dL    Creatinine 1.9 (H) 0.5 - 1.4 mg/dL     Calcium 9.1 8.7 - 10.5 mg/dL    Total Protein 7.8 6.0 - 8.4 g/dL    Albumin 3.9 3.5 - 5.2 g/dL    Total Bilirubin 0.5 0.1 - 1.0 mg/dL    Alkaline Phosphatase 59 55 - 135 U/L    AST 30 10 - 40 U/L    ALT 22 10 - 44 U/L    Anion Gap 17 (H) 8 - 16 mmol/L    eGFR 25 (A) >60 mL/min/1.73 m^2   Troponin I #1   Result Value Ref Range    Troponin I 1.595 (H) 0.000 - 0.026 ng/mL   Troponin I #2   Result Value Ref Range    Troponin I 1.472 (H) 0.000 - 0.026 ng/mL   BNP   Result Value Ref Range     (H) 0 - 99 pg/mL   COVID-19 Rapid Screening   Result Value Ref Range    SARS-CoV-2 RNA, Amplification, Qual Negative Negative     *Note: Due to a large number of results and/or encounters for the requested time period, some results have not been displayed. A complete set of results can be found in Results Review.       EKG Readings: (Independently Interpreted)   Initial Reading: No STEMI. Rhythm: Sinus Bradycardia. Ectopy: No Ectopy. Conduction: Normal. ST Segments: Normal ST Segments. T Waves: Normal. Axis: Normal. Clinical Impression: with PACs     Imaging Results              CT Head Without Contrast (Final result)  Result time 11/05/22 14:16:32      Final result by Joseline Adams MD (11/05/22 14:16:32)                   Impression:      No acute abnormality.    All CT scans at this facility use dose modulation, iterative reconstruction, and/or weight based dosing when appropriate to reduce radiation dose to as low as reasonable achievable.      Electronically signed by: Joseline Adams  Date:    11/05/2022  Time:    14:16               Narrative:    EXAMINATION:  CT HEAD WITHOUT CONTRAST    CLINICAL HISTORY:  Dizziness, persistent/recurrent, cardiac or vascular cause suspected;    TECHNIQUE:  Low dose axial CT images obtained throughout the head without intravenous contrast. Sagittal and coronal reconstructions were performed.    All CT scans at this facility use dose modulation, iterative reconstruction, and/or  weight based dosing when appropriate to reduce radiation dose to as low as reasonable achievable.    COMPARISON:  None.    FINDINGS:  Imaging degraded by motion.  No acute intracranial hemorrhage or mass effect identified.  Chronic microangiopathic change.  Ventricles nondistended.  Visualized sinuses and mastoid air cells well aerated head CT.                                       X-Ray Chest AP Portable (Final result)  Result time 11/05/22 14:12:11      Final result by PAXTON Burger Sr., MD (11/05/22 14:12:11)                   Impression:      1. There has been slight interval improvement in appearance of the lungs. There is a moderate amount of alveolar consolidation scattered throughout the lower halves of both lungs. There is persistent silhouetting of the left and right borders of the heart.  If additional evaluation is clinically indicated, I recommend consideration of bronchoscopy.  2. The left costophrenic angle is blunted.  This is characteristic of a tiny pleural effusion.  3. There is prosthetic hardware in the left shoulder.  .      Electronically signed by: Amish Burger MD  Date:    11/05/2022  Time:    14:12               Narrative:    EXAMINATION:  XR CHEST AP PORTABLE    CLINICAL HISTORY:  Chest Pain;    COMPARISON:  09/29/2022    FINDINGS:  There has been slight interval improvement in appearance of the lungs.  There is a moderate amount of alveolar consolidation scattered throughout the lower halves of both lungs.  There is persistent silhouetting of the left and right borders of the heart.  There is no pneumothorax.  The right costophrenic angle is sharp.  The left costophrenic angle is blunted.  There is prosthetic hardware in the left shoulder.                                       Medications   LETS (LIDOcaine-TETRAcaine-EPINEPHrine) gel solution (1 mL Topical (Top) Given 11/5/22 1514)         4:29 PM  Patient is stable and nontoxic.  She is day 1 postoperative from ablation.  She has  elevated troponin trending downward.  She had  generalized weakness and dizziness postoperatively since the surgery.  She also has a small abrasion on her upper lip which is now feeling much better with let us.  Patient clinically has postoperative fatigue with small abrasion on her lip.  Cardiac workup is benign.  She is stable safe for discharge my opinion.  I have recommended Orajel to the lip for symptoms.  She verbalized agreement understanding with all instructions seems reliable.  She is safe for discharge my opinion.                     Clinical Impression:   Final diagnoses:  [R07.9] Chest pain  [R53.1] Weakness (Primary)  [R51.9] Nonintractable headache, unspecified chronicity pattern, unspecified headache type  [S00.511A] Abrasion of lip, initial encounter        ED Disposition Condition    Discharge Stable          ED Prescriptions    None       Follow-up Information       Follow up With Specialties Details Why Contact Info    Heather Miller NP Family Medicine   [address]  [place]  [place] [id number]  [phone]               Prasad Galloway Jr., MD  11/05/22 4740

## 2022-11-07 ENCOUNTER — TELEPHONE (OUTPATIENT)
Dept: PRIMARY CARE CLINIC | Facility: CLINIC | Age: 87
End: 2022-11-07
Payer: MEDICARE

## 2022-11-07 ENCOUNTER — TELEPHONE (OUTPATIENT)
Dept: CARDIOLOGY | Facility: CLINIC | Age: 87
End: 2022-11-07
Payer: MEDICARE

## 2022-11-07 NOTE — TELEPHONE ENCOUNTER
----- Message from Phylicia Deleon MD sent at 11/5/2022  5:22 PM CDT -----  Regarding: ED f/u  S/p cardiac ablation went to ED Sat w weakness and bradycardia - discharged home

## 2022-11-07 NOTE — TELEPHONE ENCOUNTER
Spoke with pt in regards to medication concerns, pt stated she's been in  the hospital since her heart operation. Pt stated Saturday she went to the ER because her HR was 45 and she was experiencing some dizziness. Yesterday she stated her b/p was 109/58 with HR of 54. Pt stated she takes her Lopressor, Lasix and eliquis in the morning and lopressor, Lasix and cozaar at night. Pt wants to know what meds you advise for her to take in regards to BP and HR      Please advise, thank you.        Pt states that she has been in the ER since operation. Her blood pressure has been low and she needs advise on which medicine to take. Please call her back at [phone].

## 2022-11-09 ENCOUNTER — TELEPHONE (OUTPATIENT)
Dept: ELECTROPHYSIOLOGY | Facility: CLINIC | Age: 87
End: 2022-11-09
Payer: MEDICARE

## 2022-11-09 ENCOUNTER — OFFICE VISIT (OUTPATIENT)
Dept: PRIMARY CARE CLINIC | Facility: CLINIC | Age: 87
End: 2022-11-09
Payer: MEDICARE

## 2022-11-09 VITALS
HEART RATE: 58 BPM | WEIGHT: 183.81 LBS | SYSTOLIC BLOOD PRESSURE: 145 MMHG | HEIGHT: 64 IN | DIASTOLIC BLOOD PRESSURE: 65 MMHG | OXYGEN SATURATION: 96 % | BODY MASS INDEX: 31.38 KG/M2 | TEMPERATURE: 98 F

## 2022-11-09 DIAGNOSIS — I48.92 ATRIAL FLUTTER WITH RAPID VENTRICULAR RESPONSE: ICD-10-CM

## 2022-11-09 PROCEDURE — 1101F PT FALLS ASSESS-DOCD LE1/YR: CPT | Mod: CPTII,S$GLB,, | Performed by: NURSE PRACTITIONER

## 2022-11-09 PROCEDURE — 1101F PR PT FALLS ASSESS DOC 0-1 FALLS W/OUT INJ PAST YR: ICD-10-PCS | Mod: CPTII,S$GLB,, | Performed by: NURSE PRACTITIONER

## 2022-11-09 PROCEDURE — 3288F FALL RISK ASSESSMENT DOCD: CPT | Mod: CPTII,S$GLB,, | Performed by: NURSE PRACTITIONER

## 2022-11-09 PROCEDURE — 99999 PR PBB SHADOW E&M-EST. PATIENT-LVL IV: ICD-10-PCS | Mod: PBBFAC,,, | Performed by: NURSE PRACTITIONER

## 2022-11-09 PROCEDURE — 90694 VACC AIIV4 NO PRSRV 0.5ML IM: CPT | Mod: S$GLB,,, | Performed by: NURSE PRACTITIONER

## 2022-11-09 PROCEDURE — 1160F PR REVIEW ALL MEDS BY PRESCRIBER/CLIN PHARMACIST DOCUMENTED: ICD-10-PCS | Mod: CPTII,S$GLB,, | Performed by: NURSE PRACTITIONER

## 2022-11-09 PROCEDURE — 99213 OFFICE O/P EST LOW 20 MIN: CPT | Mod: 25,S$GLB,, | Performed by: NURSE PRACTITIONER

## 2022-11-09 PROCEDURE — G0008 FLU VACCINE - QUADRIVALENT - ADJUVANTED: ICD-10-PCS | Mod: S$GLB,,, | Performed by: NURSE PRACTITIONER

## 2022-11-09 PROCEDURE — 99999 PR PBB SHADOW E&M-EST. PATIENT-LVL IV: CPT | Mod: PBBFAC,,, | Performed by: NURSE PRACTITIONER

## 2022-11-09 PROCEDURE — 1159F PR MEDICATION LIST DOCUMENTED IN MEDICAL RECORD: ICD-10-PCS | Mod: CPTII,S$GLB,, | Performed by: NURSE PRACTITIONER

## 2022-11-09 PROCEDURE — 99213 PR OFFICE/OUTPT VISIT, EST, LEVL III, 20-29 MIN: ICD-10-PCS | Mod: 25,S$GLB,, | Performed by: NURSE PRACTITIONER

## 2022-11-09 PROCEDURE — 1160F RVW MEDS BY RX/DR IN RCRD: CPT | Mod: CPTII,S$GLB,, | Performed by: NURSE PRACTITIONER

## 2022-11-09 PROCEDURE — 90694 FLU VACCINE - QUADRIVALENT - ADJUVANTED: ICD-10-PCS | Mod: S$GLB,,, | Performed by: NURSE PRACTITIONER

## 2022-11-09 PROCEDURE — 1159F MED LIST DOCD IN RCRD: CPT | Mod: CPTII,S$GLB,, | Performed by: NURSE PRACTITIONER

## 2022-11-09 PROCEDURE — G0008 ADMIN INFLUENZA VIRUS VAC: HCPCS | Mod: S$GLB,,, | Performed by: NURSE PRACTITIONER

## 2022-11-09 PROCEDURE — 3288F PR FALLS RISK ASSESSMENT DOCUMENTED: ICD-10-PCS | Mod: CPTII,S$GLB,, | Performed by: NURSE PRACTITIONER

## 2022-11-09 NOTE — TELEPHONE ENCOUNTER
Spoke to patient regarding post op care. Patient reports having to go to the ER 11/5/22 for an episode of weakness, headache and near syncope, HR 42 when she took it at first sign of weakness.   She was treated and released from the ER.   She complains of still feeling SOB and decreased strength in her legs.   She is using a walker to prevent from falling.  No symptoms of afib,  instructed on blanking period and when to seek medical attention.   She said her legs are swollen and she has resumed her lasix.  She is seeing her general cardiologist tomorrow.   Wound site dry and intact without redness, swelling, hematoma or drainage. Patient denies any fever.    Patient  has resumed medications including Eliquis and Metoprolol  She refuses to take her Protonix as prescribed post op.   I explained to her the healing effects on the esophageal area post ablation  and she said she read the side effects and prefers no to take it.   Patient verbalizes understanding of all post op instructions.

## 2022-11-09 NOTE — PROGRESS NOTES
"Shirley Metz  11/10/2022  [id number]    Heather Miller NP  Patient Care Team:  Heather Miller NP as PCP - General (Family Medicine)  Wilbert Ware MD as Consulting Physician (Ophthalmology)  Rajinder Quintanilla MD as Consulting Physician (Ophthalmology)  Suzan Gregorio PA-C as Physician Assistant (Rheumatology)  Rosario Valadez MD as Consulting Physician (Dermatology)  Trevon Ramirez MD as Consulting Physician (Cardiology)  Amish Mendoza MD (Pulmonary Disease)  Jaquan Choi MD as Consulting Physician (Vascular Surgery)  Debbie Choi MD as Consulting Physician (Gynecology)  Emmanuel Fish MD as Consulting Physician (Hand Surgery)  PAXTON Chaidez Jr., MD as Consulting Physician (Orthopedic Surgery)  Phylicia Deleon MD as Physician (Internal Medicine)      [facility] Primary Care Note      Chief Complaint:  Chief Complaint   Patient presents with    Hospital f/u      Hospital f/u for AFib attack         History of Present Illness:  HPI    Transitional Care Note    Family and/or Caretaker present at visit?  No.  Diagnostic tests reviewed/disposition: No diagnosic tests pending after this hospitalization.  Disease/illness education: discussed sx mgmt, medications, Afib dz.  Home health/community services discussion/referrals: Patient does not have home health established from hospital visit.  They do not need home health.  If needed, we will set up home health for the patient.   Establishment or re-establishment of referral orders for community resources: No other necessary community resources.   Discussion with other health care providers: No discussion with other health care providers necessary.           Still feels leg weakness that limits activity.  Ambulating approx 200 feet then rests.   This is improving.   Some RUIZ but this is improving.     Lip swelling and "torn up" after cardioversion.   Seen in ER for the same and bradycardia.  Using Oragel and this helps.    Has appt " with Dr Ramirez tomorrow.     Using Timolol gtts - caused burning/blurred vision. She has stopped them.    Did not start pantoprazole. Doesn't believe she's having any sx to warrant rx.  Taking Lasix, unclear dosing, still some occasional swelling.     Taking alprazolam to sleep at night. Some improvement since hospital discharge.     Occasional RLQ pain since procedure. Pt attributes to bladder spasm.     Has appt with Dr Ramirez tomorrow.     Review of Systems   Constitutional:  Negative for chills and fever.   Respiratory:  Positive for shortness of breath (after ambulating 200 feet.). Negative for cough.    Cardiovascular:  Negative for chest pain, palpitations and leg swelling.   Gastrointestinal:  Negative for abdominal pain, constipation, diarrhea, heartburn, nausea and vomiting.   Genitourinary:  Negative for dysuria.   Musculoskeletal:  Negative for falls and myalgias.   Neurological:  Negative for dizziness and headaches.   Psychiatric/Behavioral:  Negative for depression.        The following were reviewed: Active problem list, medication list, allergies, family history, social history, and Health Maintenance.     History:  Past Medical History:   Diagnosis Date    Anemia 11/29/2018    Anxiety 11/28/2017    Arthritis     Atrial fibrillation with RVR 10/9/2019    Back pain     Basal cell carcinoma 03/29/2018    left mid back    Chronic diastolic congestive heart failure 10/15/2019    CKD (chronic kidney disease) stage 3, GFR 30-59 ml/min 8/8/2016    Colon polyps     reported per patient.     Coronary artery calcification 10/5/2021    Fuchs' corneal dystrophy     General anesthetics causing adverse effect in therapeutic use     woke up during surgery and gagged on ET tube    Glaucoma     Glaucoma     Heart failure with preserved left ventricular function (HFpEF) 7/24/2018    Hyperlipidemia     Hypertension     Macular degeneration dry    Obesity     PVD (peripheral vascular disease) 4/26/2021    PVD (peripheral  vascular disease) 4/26/2021    Squamous cell carcinoma 01/08/2019    left shoulder    Stenosis of carotid artery 10/5/2021    Trouble in sleeping     Type 2 diabetes mellitus without complication, without long-term current use of insulin 2/24/2021    Urinary tract infection      Past Surgical History:   Procedure Laterality Date    ABLATION OF ARRHYTHMOGENIC FOCUS FOR ATRIAL FIBRILLATION N/A 11/3/2022    Procedure: Ablation atrial fibrillation;  Surgeon: Toi Kelly MD;  Location: [facility] EP LAB;  Service: Cardiology;  Laterality: N/A;  afib, PVI/CTI, RFA, STACEY (cx if SR), DEDE, anes, MB, 3 Prep    ADENOIDECTOMY  1938    BREAST BIOPSY Left     1997. benign    BREAST CYST EXCISION Left 1997 approx    CARPAL TUNNEL RELEASE Right 2012 approx    CATARACT EXTRACTION Bilateral 1994    CHOLECYSTECTOMY  1975    CORNEAL TRANSPLANT Bilateral 08/2015 8/2015 - left; Right 4/2016    EYE SURGERY      Fuch's Corneal dystrophy - had 2nd operation with Dr. Quintanilla    EYE SURGERY      Cataract wIOL    left thumb Left 2011    removed cuboid for arthritis    REVERSE TOTAL SHOULDER ARTHROPLASTY Left 8/21/2018    Procedure: ARTHROPLASTY, SHOULDER, TOTAL, REVERSE;  Surgeon: Solomon Lujan MD;  Location: [facility] OR;  Service: Orthopedics;  Laterality: Left;  WITH BICEP TENODESIS    SKIN CANCER EXCISION Left 2017    BCC removal by Dr. Valadez    SLT- OS (aka TRABECULOPLASTY) Left 05/11/2011    repeat 3/14/12    TENOTOMY Left 8/21/2018    Procedure: TENOTOMY;  Surgeon: Solomon Lujan MD;  Location: [facility] OR;  Service: Orthopedics;  Laterality: Left;    TONSILLECTOMY  1938    TREATMENT OF CARDIAC ARRHYTHMIA N/A 10/10/2019    Procedure: CARDIOVERSION;  Surgeon: Pete Durán MD;  Location: [facility] CATH LAB;  Service: Cardiology;  Laterality: N/A;    TREATMENT OF CARDIAC ARRHYTHMIA N/A 12/6/2019    Procedure: CARDIOVERSION;  Surgeon: Samy Castillo MD;  Location: [facility] CATH LAB;  Service: Cardiology;  Laterality: N/A;     Family History    Problem Relation Age of Onset    Heart disease Mother     Hypertension Mother     Cancer Father 88        sarcoma( ?[place]'s ) on leg    Deep vein thrombosis Neg Hx     Ovarian cancer Neg Hx     Breast cancer Neg Hx     Kidney disease Neg Hx     Strabismus Neg Hx     Retinal detachment Neg Hx     Macular degeneration Neg Hx     Glaucoma Neg Hx     Blindness Neg Hx     Amblyopia Neg Hx     Eczema Neg Hx     Lupus Neg Hx     Melanoma Neg Hx     Psoriasis Neg Hx     Diabetes Neg Hx     Stroke Neg Hx     Mental retardation Neg Hx     Mental illness Neg Hx     Hyperlipidemia Neg Hx     COPD Neg Hx     Asthma Neg Hx     Depression Neg Hx     Alcohol abuse Neg Hx     Drug abuse Neg Hx      Social History     Socioeconomic History    Marital status:     Number of children: 3   Tobacco Use    Smoking status: Never    Smokeless tobacco: Never    Tobacco comments:     history of passive smoking from her ex-   Substance and Sexual Activity    Alcohol use: Yes     Alcohol/week: 2.0 standard drinks     Types: 2 Standard drinks or equivalent per week     Comment: occ    Drug use: No    Sexual activity: Not Currently     Partners: Male     Birth control/protection: Post-menopausal     Comment: discussed protection for STD's   Other Topics Concern    Are you pregnant or think you may be? No    Breast-feeding No   Social History Narrative     x 2,  x 1. Retired artist - previously doing jewelry/wall pieces; ; lives in [place]; has 3 sons - 52( lives in [place], 54, and 56;exercises minimally - limited due to back issues. Still drives. Does have a Living Will.     Social Determinants of Health     Physical Activity: Unknown    Days of Exercise per Week: Patient refused    Minutes of Exercise per Session: 10 min   Stress: No Stress Concern Present    Feeling of Stress : Not at all     Patient Active Problem List   Diagnosis    HTN (hypertension)    Mixed hyperlipidemia     Primary osteoarthritis involving multiple joints    Macular degeneration - Both Eyes    Fuch's endothelial dystrophy - Both Eyes    Lens replaced by other means    COAG (chronic open-angle glaucoma) - Both Eyes    Blepharitis, unspecified - Both Eyes    Shortness of breath    Abnormal ECG    PVC's (premature ventricular contractions)    Other microscopic hematuria    LVH (left ventricular hypertrophy) due to hypertensive disease    Osteopenia of hip    Myalgia    Calcification of aorta    Neuropathy    Unequal blood pressures in paired extremities    Medication side effects    History of cornea transplant    Pseudophakia    Insomnia    Anxiety    Pre-diabetes    Obesity (BMI 30.0-34.9)    Elevated troponin    Bilateral shoulder region arthritis    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    Anemia    Thrombus of left atrial appendage    PAF (paroxysmal atrial fibrillation)    Elevated liver enzymes    Atrial flutter with rapid ventricular response    Chronic anticoagulation    Keratitis sicca, bilateral    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    PAD (peripheral artery disease)    Statin intolerance    Pulmonary nodules/lesions, multiple    Spinal stenosis at L4-L5 level    Pulmonary granuloma    Lumbar spondylosis    Bilateral recurrent inguinal hernia without obstruction or gangrene    LLQ abdominal pain    Pain in left leg    Rectal abnormality    Stenosis of carotid artery    Carotid artery calcification, bilateral    B12 deficiency    Seasonal allergic rhinitis    Atrial fibrillation with RVR    Elevated serum creatinine    Coagulopathy     Review of patient's allergies indicates:   Allergen Reactions    Carvedilol Swelling     lips  lips  Other reaction(s): swelling    Cephalexin Other (See Comments)     Muscle/joint weakness unable to move     Doxycycline calcium Other (See Comments)     Weakness nausea   sob  Other reaction(s): weakness, nausea, SOB     Erythromycin Other (See Comments)     Complete weakness/could not walk    Gadolinium-containing contrast media Other (See Comments)     angioedema  Other reaction(s): Angioedema    Hydrocodone-acetaminophen      wheezing  wheezing  wheezing  Other reaction(s): wheezing    Inveltys [loteprednol etabonate] Palpitations and Other (See Comments)     Patient stated bp went up.    Labetalol Shortness Of Breath, Nausea Only and Other (See Comments)     Palpitations, LE weakness  Other reaction(s): SOB, palpitations, weakness    Loratadine Other (See Comments)     Double vision/spots  Other reaction(s): double vision    Macrolide antibiotics Other (See Comments)     Complete weakness/could not walk  Complete weakness/could not walk  Complete weakness/could not walk  Complete weakness/could not walk  Other reaction(s): complete weakness    Moxifloxacin Other (See Comments)     Hives   muscle soreness  Other reaction(s): hives, muscle soreness    Naproxen      wheezing  wheezing  wheezing  Other reaction(s): wheezing    Statins-hmg-coa reductase inhibitors Other (See Comments)     Severe Muscle weakness  Muscle weakness  Severe Muscle weakness  Other reaction(s): severe muscle weakness    Timolol Other (See Comments)     Severe muscle weakness, eye problems.  Other reaction(s): severe muscle weakness    Verapamil Diarrhea     Severe diarrhea.  Other reaction(s): diarrhea    Amlodipine      Myalgias    Other reaction(s): myalgian    Doxycycline     Doxycycline hyclate      Other reaction(s): weakness, nausea, SOB    Fenofibrate      Causes muscle weakness  Other reaction(s): muscle weakness    Hydralazine      Other reaction(s): muscle tremors    Hydralazine analogues      Muscle tremors/aches      Hydrocortisone     Isosorbide      Tolerates Imdur    Loteprednol      Other reaction(s): increased BP    Tetanus and diphtheria toxoids     Adhesive Rash     Clonidine patch - 2 mfg - contact dermatitis    Codeine Diarrhea and  Other (See Comments)     Loss of balance   Other reaction(s): loss of balance    Doxazosin Palpitations     Other reaction(s): palpitations    Fexofenadine Other (See Comments)     Double vision/spots   Other reaction(s): double vision    Hydrocortisone (bulk) Other (See Comments)     Only suppository/ caused muscle weakness    Latanoprost Other (See Comments)     Muscle weakness  Other reaction(s): muscle weakness    Olopatadine Other (See Comments)     Muscle weakness  Other reaction(s): muscle weakness    Prednisone Anxiety       Medications:  Current Outpatient Medications on File Prior to Visit   Medication Sig Dispense Refill    acetaminophen (TYLENOL) 500 MG tablet Take 500 mg by mouth daily as needed. Taking 1 to 3      ALPRAZolam (XANAX) 0.25 MG tablet Take 1 tablet (0.25 mg total) by mouth nightly as needed for Insomnia or Anxiety. 30 tablet 2    apixaban (ELIQUIS) 2.5 mg Tab Take 1 tablet (2.5 mg total) by mouth 2 (two) times daily. 180 tablet 3    b complex vitamins capsule Take 1 capsule by mouth once daily.      cholecalciferol, vitamin D3, (VITAMIN D3) 50 mcg (2,000 unit) Cap Take 1 capsule by mouth once daily.      coenzyme Q10 200 mg capsule Take 400 mg by mouth once daily.       fish oil-omega-3 fatty acids 300-1,000 mg capsule Take 2 g by mouth 2 (two) times daily.       furosemide (LASIX) 40 MG tablet Take 1 tablet (40 mg total) by mouth once daily. Alternate - 40mg twice a day and 40mg daily 180 tablet 1    losartan (COZAAR) 25 MG tablet Take 1 tablet (25 mg total) by mouth once daily. 180 tablet 1    metoprolol tartrate (LOPRESSOR) 25 MG tablet Take 1 tablet (25 mg total) by mouth 2 (two) times daily. 180 tablet 1    POLYETHYLENE GLYCOL 3350 (MIRALAX ORAL) Take 17 g by mouth 2 (two) times a day. Taking BID      RHOPRESSA 0.02 % ophthalmic solution PLACE 1 DROP INTO THE LEFT EYE EVERY EVENING. 2.5 mL 11    travoprost (TRAVATAN Z) 0.004 % ophthalmic solution Place 1 drop into the left eye every  evening. 2.5 mL 12    [DISCONTINUED] pantoprazole (PROTONIX) 40 MG tablet Take 1 tablet (40 mg total) by mouth once daily. 30 tablet 0    [DISCONTINUED] timolol maleate 0.5% (TIMOPTIC) 0.5 % Drop Place 1 drop into the left eye every morning. 5 mL 12    [DISCONTINUED] cloNIDine (CATAPRES) 0.1 MG tablet Take 1 tablet (0.1 mg total) by mouth 2 (two) times daily as needed (systolic BP over 180). 20 tablet 1    [DISCONTINUED] isosorbide mononitrate (IMDUR) 30 MG 24 hr tablet Take 1 tablet (30 mg total) by mouth every evening. 90 tablet 1     Current Facility-Administered Medications on File Prior to Visit   Medication Dose Route Frequency Provider Last Rate Last Admin    sodium chloride 0.9% flush 3 mL  3 mL Intravenous PRN Steve Galarza PA-C        vancomycin in dextrose 5 % 1 gram/250 mL IVPB 1,000 mg  1,000 mg Intravenous On Call Procedure Vianney Fountain NP           Medications have been reviewed and reconciled with patient at visit today.    Barriers to medications present (yes )    Fall since last office visit (no )      Exam:  Vitals:    11/09/22 1103   BP: (!) 145/65   Pulse: (!) 58   Temp: 98.2 °F (36.8 °C)     Weight: 83.4 kg (183 lb 12.8 oz)   Body mass index is 31.55 kg/m².      BP Readings from Last 3 Encounters:   11/09/22 (!) 145/65   11/05/22 124/64   11/04/22 133/63     Wt Readings from Last 3 Encounters:   11/09/22 1103 83.4 kg (183 lb 12.8 oz)   11/05/22 1212 81.6 kg (180 lb)   11/03/22 0920 81.6 kg (180 lb)            Physical Exam  Constitutional:       General: She is not in acute distress.  HENT:      Nose: Nose normal.      Mouth/Throat:      Mouth: Mucous membranes are moist.   Eyes:      General: No scleral icterus.  Cardiovascular:      Rate and Rhythm: Normal rate and regular rhythm.   Pulmonary:      Effort: Pulmonary effort is normal.      Breath sounds: Rhonchi (few that clear with cough) present.   Abdominal:      General: There is no distension.      Palpations: Abdomen is  soft.      Tenderness: There is no abdominal tenderness.   Musculoskeletal:         General: No swelling.   Skin:     General: Skin is warm and dry.      Findings: No bruising.   Neurological:      Mental Status: She is alert. Mental status is at baseline.      Motor: No weakness.      Gait: Gait normal.   Psychiatric:         Mood and Affect: Mood normal.         Behavior: Behavior normal.       Laboratory Reviewed: (Yes)  Lab Results   Component Value Date    WBC 11.56 11/05/2022    HGB 12.3 11/05/2022    HCT 37.8 11/05/2022     11/05/2022    CHOL 168 08/31/2022    TRIG 98 08/31/2022    HDL 43 08/31/2022    ALT 22 11/05/2022    AST 30 11/05/2022     11/05/2022    K 4.2 11/05/2022     11/05/2022    CREATININE 1.9 (H) 11/05/2022    BUN 43 (H) 11/05/2022    CO2 17 (L) 11/05/2022    TSH 3.826 08/31/2022    INR 1.1 10/25/2022    HGBA1C 6.2 (H) 08/31/2022           Health Maintenance  Health Maintenance Topics with due status: Not Due       Topic Last Completion Date    Pneumococcal Vaccines (Age 65+) 03/16/2022    Lipid Panel 08/31/2022     Health Maintenance Due   Topic Date Due    Shingles Vaccine (1 of 2) Never done    TETANUS VACCINE  11/09/2021    COVID-19 Vaccine (4 - Booster for Pfizer series) 11/24/2021       Assessment:  Problem List Items Addressed This Visit          Cardiac/Vascular    Atrial flutter with rapid ventricular response     RRR today.   Sx improving since ablation/cardioversion.   Plans to discuss furosemide and apixaban use with Dr Ramirez tomorrow.               Plan:  Atrial flutter with rapid ventricular response    Other orders  -     Influenza - Quadrivalent (Adjuvanted)    -Patient's lab results were reviewed and discussed with patient  -Treatment options and alternatives were discussed with the patient. Patient expressed understanding. Patient was given the opportunity to ask questions and be an active participant in their medical care. Patient had no further questions  or concerns at this time.   -Documentation of patient's health and condition was obtained from family member who was present during visit.  -Patient is an overall moderate risk for health complications from their medical conditions.       Follow up: Follow up in about 4 weeks (around 12/7/2022).      After visit summary printed and given to patient upon discharge.  Patient goals and care plan are included in After visit summary.    Total medical decision making time was 29 min.  The following issues were discussed: The encounter diagnosis was Atrial flutter with rapid ventricular response.    Health maintenance needs, recent test results and goals of care discussed with pt and questions answered.

## 2022-11-10 ENCOUNTER — OFFICE VISIT (OUTPATIENT)
Dept: CARDIOLOGY | Facility: CLINIC | Age: 87
End: 2022-11-10
Payer: MEDICARE

## 2022-11-10 VITALS
BODY MASS INDEX: 31.52 KG/M2 | OXYGEN SATURATION: 94 % | SYSTOLIC BLOOD PRESSURE: 130 MMHG | HEART RATE: 68 BPM | WEIGHT: 183.63 LBS | DIASTOLIC BLOOD PRESSURE: 68 MMHG

## 2022-11-10 DIAGNOSIS — I48.0 PAF (PAROXYSMAL ATRIAL FIBRILLATION): ICD-10-CM

## 2022-11-10 DIAGNOSIS — I49.3 PVC'S (PREMATURE VENTRICULAR CONTRACTIONS): ICD-10-CM

## 2022-11-10 DIAGNOSIS — E78.2 MIXED HYPERLIPIDEMIA: ICD-10-CM

## 2022-11-10 DIAGNOSIS — I65.23 CAROTID ARTERY CALCIFICATION, BILATERAL: ICD-10-CM

## 2022-11-10 DIAGNOSIS — I48.3 TYPICAL ATRIAL FLUTTER: ICD-10-CM

## 2022-11-10 DIAGNOSIS — I48.0 PAROXYSMAL ATRIAL FIBRILLATION: Primary | ICD-10-CM

## 2022-11-10 DIAGNOSIS — I70.0 CALCIFICATION OF AORTA: ICD-10-CM

## 2022-11-10 DIAGNOSIS — I50.32 CHRONIC HEART FAILURE WITH PRESERVED EJECTION FRACTION: ICD-10-CM

## 2022-11-10 DIAGNOSIS — I11.9 HYPERTENSIVE LEFT VENTRICULAR HYPERTROPHY, WITHOUT HEART FAILURE: ICD-10-CM

## 2022-11-10 DIAGNOSIS — I65.29 STENOSIS OF CAROTID ARTERY, UNSPECIFIED LATERALITY: ICD-10-CM

## 2022-11-10 DIAGNOSIS — I10 PRIMARY HYPERTENSION: ICD-10-CM

## 2022-11-10 PROCEDURE — 1160F PR REVIEW ALL MEDS BY PRESCRIBER/CLIN PHARMACIST DOCUMENTED: ICD-10-PCS | Mod: CPTII,S$GLB,, | Performed by: INTERNAL MEDICINE

## 2022-11-10 PROCEDURE — 1160F RVW MEDS BY RX/DR IN RCRD: CPT | Mod: CPTII,S$GLB,, | Performed by: INTERNAL MEDICINE

## 2022-11-10 PROCEDURE — 99999 PR PBB SHADOW E&M-EST. PATIENT-LVL III: CPT | Mod: PBBFAC,,, | Performed by: INTERNAL MEDICINE

## 2022-11-10 PROCEDURE — 99214 OFFICE O/P EST MOD 30 MIN: CPT | Mod: S$GLB,,, | Performed by: INTERNAL MEDICINE

## 2022-11-10 PROCEDURE — 99214 PR OFFICE/OUTPT VISIT, EST, LEVL IV, 30-39 MIN: ICD-10-PCS | Mod: S$GLB,,, | Performed by: INTERNAL MEDICINE

## 2022-11-10 PROCEDURE — 99999 PR PBB SHADOW E&M-EST. PATIENT-LVL III: ICD-10-PCS | Mod: PBBFAC,,, | Performed by: INTERNAL MEDICINE

## 2022-11-10 PROCEDURE — 1159F PR MEDICATION LIST DOCUMENTED IN MEDICAL RECORD: ICD-10-PCS | Mod: CPTII,S$GLB,, | Performed by: INTERNAL MEDICINE

## 2022-11-10 PROCEDURE — 1159F MED LIST DOCD IN RCRD: CPT | Mod: CPTII,S$GLB,, | Performed by: INTERNAL MEDICINE
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
## 2022-11-10 NOTE — ASSESSMENT & PLAN NOTE
RRR today.   Sx improving since ablation/cardioversion.   Plans to discuss furosemide and apixaban use with Dr Ramirez tomorrow.

## 2022-11-13 ENCOUNTER — PATIENT MESSAGE (OUTPATIENT)
Dept: OPHTHALMOLOGY | Facility: CLINIC | Age: 87
End: 2022-11-13
Payer: MEDICARE

## 2022-11-15 ENCOUNTER — PATIENT MESSAGE (OUTPATIENT)
Dept: CARDIOLOGY | Facility: CLINIC | Age: 87
End: 2022-11-15
Payer: MEDICARE

## 2022-11-17 ENCOUNTER — LAB VISIT (OUTPATIENT)
Dept: LAB | Facility: HOSPITAL | Age: 87
End: 2022-11-17
Attending: INTERNAL MEDICINE
Payer: MEDICARE

## 2022-11-17 DIAGNOSIS — I50.33 ACUTE ON CHRONIC HEART FAILURE WITH PRESERVED EJECTION FRACTION: ICD-10-CM

## 2022-11-17 PROCEDURE — 83880 ASSAY OF NATRIURETIC PEPTIDE: CPT | Performed by: INTERNAL MEDICINE

## 2022-11-17 PROCEDURE — 36415 COLL VENOUS BLD VENIPUNCTURE: CPT | Performed by: INTERNAL MEDICINE

## 2022-11-18 LAB — BNP SERPL-MCNC: 288 PG/ML (ref 0–99)

## 2022-11-29 ENCOUNTER — OFFICE VISIT (OUTPATIENT)
Dept: OPHTHALMOLOGY | Facility: CLINIC | Age: 87
End: 2022-11-29
Payer: MEDICARE

## 2022-11-29 DIAGNOSIS — M35.01 KERATITIS SICCA, BILATERAL: ICD-10-CM

## 2022-11-29 DIAGNOSIS — Z96.1 PSEUDOPHAKIA: ICD-10-CM

## 2022-11-29 DIAGNOSIS — H40.1131 PRIMARY OPEN ANGLE GLAUCOMA (POAG) OF BOTH EYES, MILD STAGE: Primary | ICD-10-CM

## 2022-11-29 DIAGNOSIS — Z94.7 HISTORY OF CORNEA TRANSPLANT: ICD-10-CM

## 2022-11-29 PROCEDURE — 1160F PR REVIEW ALL MEDS BY PRESCRIBER/CLIN PHARMACIST DOCUMENTED: ICD-10-PCS | Mod: CPTII,S$GLB,, | Performed by: OPHTHALMOLOGY

## 2022-11-29 PROCEDURE — 99999 PR PBB SHADOW E&M-EST. PATIENT-LVL III: CPT | Mod: PBBFAC,,, | Performed by: OPHTHALMOLOGY

## 2022-11-29 PROCEDURE — 1159F PR MEDICATION LIST DOCUMENTED IN MEDICAL RECORD: ICD-10-PCS | Mod: CPTII,S$GLB,, | Performed by: OPHTHALMOLOGY

## 2022-11-29 PROCEDURE — 1159F MED LIST DOCD IN RCRD: CPT | Mod: CPTII,S$GLB,, | Performed by: OPHTHALMOLOGY

## 2022-11-29 PROCEDURE — 99999 PR PBB SHADOW E&M-EST. PATIENT-LVL III: ICD-10-PCS | Mod: PBBFAC,,, | Performed by: OPHTHALMOLOGY

## 2022-11-29 PROCEDURE — 1160F RVW MEDS BY RX/DR IN RCRD: CPT | Mod: CPTII,S$GLB,, | Performed by: OPHTHALMOLOGY

## 2022-11-29 PROCEDURE — 99214 OFFICE O/P EST MOD 30 MIN: CPT | Mod: S$GLB,,, | Performed by: OPHTHALMOLOGY

## 2022-11-29 PROCEDURE — 99214 PR OFFICE/OUTPT VISIT, EST, LEVL IV, 30-39 MIN: ICD-10-PCS | Mod: S$GLB,,, | Performed by: OPHTHALMOLOGY

## 2022-11-29 RX ORDER — BRINZOLAMIDE 10 MG/ML
1 SUSPENSION/ DROPS OPHTHALMIC 2 TIMES DAILY
Qty: 5 ML | Refills: 12 | Status: SHIPPED | OUTPATIENT
Start: 2022-11-29 | End: 2023-04-19

## 2022-11-29 RX ORDER — TRAVOPROST 0.04 MG/ML
1 SOLUTION/ DROPS OPHTHALMIC NIGHTLY
Qty: 2.5 ML | Refills: 12 | Status: SHIPPED | OUTPATIENT
Start: 2022-11-29 | End: 2023-12-05 | Stop reason: SDUPTHER

## 2022-11-29 NOTE — PROGRESS NOTES
SUBJECTIVE  AdrianaIfrah Metz is 89 y.o. female  Corrected distance visual acuity was 20/40 -2 in the right eye and 20/40 -2 in the left eye.   Chief Complaint   Patient presents with    Glaucoma          HPI    5 weeks glaucoma check    Patient states that she was not able to use the Timolol because it dried   her eyes and mouth out.  Also, her vision was blurry from the Timolol.  1. Mild COAG Goal < 19   SLT OD 3/04 (20 to 15) + 3/11 (19 to 15)   SLT OS 5/05 (20 to 14) +5/11 (18-19 to 15) + 3/12 (min resp.) + 3/11/15   (20.5-17.5) + 3/7/18 (24- 21) + 11/3/21 (22.5-17.5)  (Cosopt, Xalatan, and Timolol cause Myalgias)   (Alphagan causes redness) (zioptan too expensive)   Possible steroid responder   2. PCIOL OU   3. Mild Dry AMD   4. Fuch's Dystrophy   DSAEK OD 4/16 Dr. Quintanilla (followed by [facility] every summer)   DSAEK OS 8/15 Dr. Quintanilla   Rx Inveltys (1% lotemax) (IOP 15/23) 5/29/20   Dr. Quintanilla Ok'd to stop Lotemax OU  5. Dry Eye -intolerance to Restasis   6. Recent A-Fib. (from Labetolol ?)       Systane Ultra 4-5 tiimes daily   Travatan Z qhs os   Rhopressa qhs OS   Timolol qam OS (not using)    +HAG  Last edited by Cheyenne Mensah MA on 11/29/2022  1:11 PM.         Assessment /Plan :  1. Primary open angle glaucoma (POAG) of both eyes, mild stage     Doing well, intraocular pressure (IOP) OD within acceptable range relative to target IOP and no evidence of progression. Continue current treatment. Reviewed importance of continued compliance with treatment and follow up.    Intraocular pressure (IOP) OS not within acceptable range relative to target IOP with risk of irreversible visual loss. Better IOP control is recommended. Treatment options may include change or additional medications, Selective Laser Trabeculoplasty (SLT laser), and/or incisional glaucoma surgery. Reviewed importance of continued compliance with treatment and follow up.     Patient chooses  to add Azopt one drop in the left eye 2  times a day    continue  Rhopressa one drop in the left eye nightly and Travatan Z one drop in the left eye every night    Return to clinic in 5-6  weeks  or as needed.  With IOP Check     2. Keratitis sicca, bilateral  -- Condition stable, no therapeutic change required. Monitoring routinely.     3. Pseudophakia  -- Condition stable, no therapeutic change required. Monitoring routinely.     4. History of cornea transplant - monitor for now

## 2022-12-07 ENCOUNTER — TELEPHONE (OUTPATIENT)
Dept: PHARMACY | Facility: CLINIC | Age: 87
End: 2022-12-07
Payer: MEDICARE

## 2022-12-07 ENCOUNTER — LAB VISIT (OUTPATIENT)
Dept: LAB | Facility: HOSPITAL | Age: 87
End: 2022-12-07
Attending: INTERNAL MEDICINE
Payer: MEDICARE

## 2022-12-07 DIAGNOSIS — R76.8 ANA POSITIVE: ICD-10-CM

## 2022-12-07 LAB
ALBUMIN SERPL BCP-MCNC: 3.7 G/DL (ref 3.5–5.2)
ALP SERPL-CCNC: 62 U/L (ref 55–135)
ALT SERPL W/O P-5'-P-CCNC: 10 U/L (ref 10–44)
ANION GAP SERPL CALC-SCNC: 12 MMOL/L (ref 8–16)
AST SERPL-CCNC: 17 U/L (ref 10–40)
BASOPHILS # BLD AUTO: 0.05 K/UL (ref 0–0.2)
BASOPHILS NFR BLD: 0.8 % (ref 0–1.9)
BILIRUB SERPL-MCNC: 0.4 MG/DL (ref 0.1–1)
BUN SERPL-MCNC: 29 MG/DL (ref 8–23)
CALCIUM SERPL-MCNC: 9.5 MG/DL (ref 8.7–10.5)
CHLORIDE SERPL-SCNC: 106 MMOL/L (ref 95–110)
CK SERPL-CCNC: 39 U/L (ref 20–180)
CO2 SERPL-SCNC: 25 MMOL/L (ref 23–29)
CREAT SERPL-MCNC: 1.7 MG/DL (ref 0.5–1.4)
DIFFERENTIAL METHOD: ABNORMAL
EOSINOPHIL # BLD AUTO: 0.2 K/UL (ref 0–0.5)
EOSINOPHIL NFR BLD: 3.2 % (ref 0–8)
ERYTHROCYTE [DISTWIDTH] IN BLOOD BY AUTOMATED COUNT: 14.4 % (ref 11.5–14.5)
EST. GFR  (NO RACE VARIABLE): 28 ML/MIN/1.73 M^2
GLUCOSE SERPL-MCNC: 128 MG/DL (ref 70–110)
HCT VFR BLD AUTO: 38.9 % (ref 37–48.5)
HGB BLD-MCNC: 12.8 G/DL (ref 12–16)
IMM GRANULOCYTES # BLD AUTO: 0.01 K/UL (ref 0–0.04)
IMM GRANULOCYTES NFR BLD AUTO: 0.2 % (ref 0–0.5)
LYMPHOCYTES # BLD AUTO: 1.3 K/UL (ref 1–4.8)
LYMPHOCYTES NFR BLD: 21.3 % (ref 18–48)
MCH RBC QN AUTO: 32.8 PG (ref 27–31)
MCHC RBC AUTO-ENTMCNC: 32.9 G/DL (ref 32–36)
MCV RBC AUTO: 100 FL (ref 82–98)
MONOCYTES # BLD AUTO: 0.6 K/UL (ref 0.3–1)
MONOCYTES NFR BLD: 9.5 % (ref 4–15)
NEUTROPHILS # BLD AUTO: 4 K/UL (ref 1.8–7.7)
NEUTROPHILS NFR BLD: 65 % (ref 38–73)
NRBC BLD-RTO: 0 /100 WBC
PLATELET # BLD AUTO: 187 K/UL (ref 150–450)
PMV BLD AUTO: 9.9 FL (ref 9.2–12.9)
POTASSIUM SERPL-SCNC: 3.7 MMOL/L (ref 3.5–5.1)
PROT SERPL-MCNC: 7.5 G/DL (ref 6–8.4)
RBC # BLD AUTO: 3.9 M/UL (ref 4–5.4)
SODIUM SERPL-SCNC: 143 MMOL/L (ref 136–145)
WBC # BLD AUTO: 6.2 K/UL (ref 3.9–12.7)

## 2022-12-07 PROCEDURE — 82550 ASSAY OF CK (CPK): CPT | Performed by: INTERNAL MEDICINE

## 2022-12-07 PROCEDURE — 36415 COLL VENOUS BLD VENIPUNCTURE: CPT | Performed by: INTERNAL MEDICINE

## 2022-12-07 PROCEDURE — 86038 ANTINUCLEAR ANTIBODIES: CPT | Mod: HCNC | Performed by: INTERNAL MEDICINE

## 2022-12-07 PROCEDURE — 82085 ASSAY OF ALDOLASE: CPT | Performed by: INTERNAL MEDICINE

## 2022-12-07 PROCEDURE — 80053 COMPREHEN METABOLIC PANEL: CPT | Performed by: INTERNAL MEDICINE

## 2022-12-07 PROCEDURE — 85025 COMPLETE CBC W/AUTO DIFF WBC: CPT | Performed by: INTERNAL MEDICINE

## 2022-12-10 LAB — ALDOLASE SERPL-CCNC: 3.8 U/L (ref 1.2–7.6)

## 2022-12-12 ENCOUNTER — OFFICE VISIT (OUTPATIENT)
Dept: PRIMARY CARE CLINIC | Facility: CLINIC | Age: 87
End: 2022-12-12
Payer: MEDICARE

## 2022-12-12 VITALS
HEART RATE: 62 BPM | DIASTOLIC BLOOD PRESSURE: 76 MMHG | SYSTOLIC BLOOD PRESSURE: 122 MMHG | OXYGEN SATURATION: 95 % | TEMPERATURE: 98 F | WEIGHT: 183.63 LBS | BODY MASS INDEX: 31.51 KG/M2

## 2022-12-12 DIAGNOSIS — I12.9 CKD STAGE 4 SECONDARY TO HYPERTENSION: ICD-10-CM

## 2022-12-12 DIAGNOSIS — I48.92 ATRIAL FLUTTER WITH RAPID VENTRICULAR RESPONSE: ICD-10-CM

## 2022-12-12 DIAGNOSIS — F41.9 ANXIETY: ICD-10-CM

## 2022-12-12 DIAGNOSIS — N18.4 CKD STAGE 4 SECONDARY TO HYPERTENSION: ICD-10-CM

## 2022-12-12 PROCEDURE — 99214 OFFICE O/P EST MOD 30 MIN: CPT | Mod: S$GLB,,, | Performed by: NURSE PRACTITIONER

## 2022-12-12 PROCEDURE — 1126F AMNT PAIN NOTED NONE PRSNT: CPT | Mod: CPTII,S$GLB,, | Performed by: NURSE PRACTITIONER

## 2022-12-12 PROCEDURE — 99999 PR PBB SHADOW E&M-EST. PATIENT-LVL IV: ICD-10-PCS | Mod: PBBFAC,,, | Performed by: NURSE PRACTITIONER

## 2022-12-12 PROCEDURE — 1159F MED LIST DOCD IN RCRD: CPT | Mod: CPTII,S$GLB,, | Performed by: NURSE PRACTITIONER

## 2022-12-12 PROCEDURE — 1160F RVW MEDS BY RX/DR IN RCRD: CPT | Mod: CPTII,S$GLB,, | Performed by: NURSE PRACTITIONER

## 2022-12-12 PROCEDURE — 99999 PR PBB SHADOW E&M-EST. PATIENT-LVL IV: CPT | Mod: PBBFAC,,, | Performed by: NURSE PRACTITIONER

## 2022-12-12 PROCEDURE — 1126F PR PAIN SEVERITY QUANTIFIED, NO PAIN PRESENT: ICD-10-PCS | Mod: CPTII,S$GLB,, | Performed by: NURSE PRACTITIONER

## 2022-12-12 PROCEDURE — 99214 PR OFFICE/OUTPT VISIT, EST, LEVL IV, 30-39 MIN: ICD-10-PCS | Mod: S$GLB,,, | Performed by: NURSE PRACTITIONER

## 2022-12-12 PROCEDURE — 1101F PT FALLS ASSESS-DOCD LE1/YR: CPT | Mod: CPTII,S$GLB,, | Performed by: NURSE PRACTITIONER

## 2022-12-12 PROCEDURE — 3288F FALL RISK ASSESSMENT DOCD: CPT | Mod: CPTII,S$GLB,, | Performed by: NURSE PRACTITIONER

## 2022-12-12 PROCEDURE — 1160F PR REVIEW ALL MEDS BY PRESCRIBER/CLIN PHARMACIST DOCUMENTED: ICD-10-PCS | Mod: CPTII,S$GLB,, | Performed by: NURSE PRACTITIONER

## 2022-12-12 PROCEDURE — 1101F PR PT FALLS ASSESS DOC 0-1 FALLS W/OUT INJ PAST YR: ICD-10-PCS | Mod: CPTII,S$GLB,, | Performed by: NURSE PRACTITIONER

## 2022-12-12 PROCEDURE — 3288F PR FALLS RISK ASSESSMENT DOCUMENTED: ICD-10-PCS | Mod: CPTII,S$GLB,, | Performed by: NURSE PRACTITIONER

## 2022-12-12 PROCEDURE — 1159F PR MEDICATION LIST DOCUMENTED IN MEDICAL RECORD: ICD-10-PCS | Mod: CPTII,S$GLB,, | Performed by: NURSE PRACTITIONER

## 2022-12-12 RX ORDER — ALPRAZOLAM 0.5 MG/1
0.5 TABLET ORAL NIGHTLY PRN
Qty: 30 TABLET | Refills: 5 | Status: SHIPPED | OUTPATIENT
Start: 2022-12-12 | End: 2023-06-02 | Stop reason: SDUPTHER

## 2022-12-12 NOTE — PROGRESS NOTES
Shirley Metz  12/12/2022  [id number]    Heather Miller NP  Patient Care Team:  Heather Miller NP as PCP - General (Family Medicine)  Wilbert Ware MD as Consulting Physician (Ophthalmology)  Rajinder Quintanilla MD as Consulting Physician (Ophthalmology)  Suzan Gregorio PA-C as Physician Assistant (Rheumatology)  Rosario Valadez MD as Consulting Physician (Dermatology)  Trevon Ramirez MD as Consulting Physician (Cardiology)  Amish Mendoza MD (Pulmonary Disease)  Jaquan Choi MD as Consulting Physician (Vascular Surgery)  Debbie Choi MD (Inactive) as Consulting Physician (Gynecology)  Emmanuel Fish MD as Consulting Physician (Hand Surgery)  PAXTON Chaidez Jr., MD as Consulting Physician (Orthopedic Surgery)  Phylicia Deleon MD as Physician (Internal Medicine)      [facility] Primary Care Note      Chief Complaint:  Chief Complaint   Patient presents with    Follow-up       History of Present Illness:  HPI    F/U Atrial fibrillation, HTN, HF.    Taking Furosemide 40 mg in AM and 20 mg q PM. Feels like she needs lasix, concerned about kidneys.     Plans to take Eliquis and furosemide indefinitely.     Increased intraocular pressure OD. Gradually coming down.   Negative reaction to timolol, angioedema? New gtt for AM.   Tolerating well.     Had fall. Tripped on rollator, landed on knees on tile floor.   No injury. No pain. No significant bruising.     Not sleeping and anxious with lower dose of alprazolam.           Review of Systems   Constitutional:  Negative for fever.   Respiratory:  Negative for cough and shortness of breath.    Cardiovascular:  Negative for palpitations.   Genitourinary:  Negative for dysuria.   Musculoskeletal:  Positive for falls. Negative for myalgias.   Psychiatric/Behavioral:  Negative for depression.        The following were reviewed: Active problem list, medication list, allergies, family history, social history, and Health Maintenance.      History:  Past Medical History:   Diagnosis Date    Anemia 11/29/2018    Anxiety 11/28/2017    Arthritis     Atrial fibrillation with RVR 10/9/2019    Back pain     Basal cell carcinoma 03/29/2018    left mid back    Chronic diastolic congestive heart failure 10/15/2019    CKD (chronic kidney disease) stage 3, GFR 30-59 ml/min 8/8/2016    Colon polyps     reported per patient.     Coronary artery calcification 10/5/2021    Fuchs' corneal dystrophy     General anesthetics causing adverse effect in therapeutic use     woke up during surgery and gagged on ET tube    Glaucoma     Glaucoma     Heart failure with preserved left ventricular function (HFpEF) 7/24/2018    Hyperlipidemia     Hypertension     Macular degeneration dry    Obesity     PVD (peripheral vascular disease) 4/26/2021    PVD (peripheral vascular disease) 4/26/2021    Squamous cell carcinoma 01/08/2019    left shoulder    Stenosis of carotid artery 10/5/2021    Trouble in sleeping     Type 2 diabetes mellitus without complication, without long-term current use of insulin 2/24/2021    Urinary tract infection      Past Surgical History:   Procedure Laterality Date    ABLATION OF ARRHYTHMOGENIC FOCUS FOR ATRIAL FIBRILLATION N/A 11/3/2022    Procedure: Ablation atrial fibrillation;  Surgeon: Toi Kelly MD;  Location: [facility] EP LAB;  Service: Cardiology;  Laterality: N/A;  afib, PVI/CTI, RFA, STACEY (cx if SR), DEDE, ABDILE orantes, 3 Prep    ADENOIDECTOMY  1938    BREAST BIOPSY Left     1997. benign    BREAST CYST EXCISION Left 1997 approx    CARPAL TUNNEL RELEASE Right 2012 approx    CATARACT EXTRACTION Bilateral 1994    CHOLECYSTECTOMY  1975    CORNEAL TRANSPLANT Bilateral 08/2015 8/2015 - left; Right 4/2016    EYE SURGERY      Fuch's Corneal dystrophy - had 2nd operation with Dr. Quintanilla    EYE SURGERY      Cataract wIOL    left thumb Left 2011    removed cuboid for arthritis    REVERSE TOTAL SHOULDER ARTHROPLASTY Left 8/21/2018    Procedure:  ARTHROPLASTY, SHOULDER, TOTAL, REVERSE;  Surgeon: Solomon Lujan MD;  Location: [facility] OR;  Service: Orthopedics;  Laterality: Left;  WITH BICEP TENODESIS    SKIN CANCER EXCISION Left 2017    BCC removal by Dr. Rory MAHONEY- OS (aka TRABECULOPLASTY) Left 05/11/2011    repeat 3/14/12    TENOTOMY Left 8/21/2018    Procedure: TENOTOMY;  Surgeon: Solomon Lujan MD;  Location: [facility] OR;  Service: Orthopedics;  Laterality: Left;    TONSILLECTOMY  1938    TREATMENT OF CARDIAC ARRHYTHMIA N/A 10/10/2019    Procedure: CARDIOVERSION;  Surgeon: Pete Durán MD;  Location: [facility] CATH LAB;  Service: Cardiology;  Laterality: N/A;    TREATMENT OF CARDIAC ARRHYTHMIA N/A 12/6/2019    Procedure: CARDIOVERSION;  Surgeon: Samy Castillo MD;  Location: [facility] CATH LAB;  Service: Cardiology;  Laterality: N/A;     Family History   Problem Relation Age of Onset    Heart disease Mother     Hypertension Mother     Cancer Father 88        sarcoma( ?Kaposi's ) on leg    Deep vein thrombosis Neg Hx     Ovarian cancer Neg Hx     Breast cancer Neg Hx     Kidney disease Neg Hx     Strabismus Neg Hx     Retinal detachment Neg Hx     Macular degeneration Neg Hx     Glaucoma Neg Hx     Blindness Neg Hx     Amblyopia Neg Hx     Eczema Neg Hx     Lupus Neg Hx     Melanoma Neg Hx     Psoriasis Neg Hx     Diabetes Neg Hx     Stroke Neg Hx     Mental retardation Neg Hx     Mental illness Neg Hx     Hyperlipidemia Neg Hx     COPD Neg Hx     Asthma Neg Hx     Depression Neg Hx     Alcohol abuse Neg Hx     Drug abuse Neg Hx      Social History     Socioeconomic History    Marital status:     Number of children: 3   Tobacco Use    Smoking status: Never    Smokeless tobacco: Never    Tobacco comments:     history of passive smoking from her ex-   Substance and Sexual Activity    Alcohol use: Yes     Alcohol/week: 2.0 standard drinks     Types: 2 Standard drinks or equivalent per week     Comment: occ    Drug use: No    Sexual activity: Not  Currently     Partners: Male     Birth control/protection: Post-menopausal     Comment: discussed protection for STD's   Other Topics Concern    Are you pregnant or think you may be? No    Breast-feeding No   Social History Narrative     x 2,  x 1. Retired artist - previously doing jewelry/wall pieces; ; lives in [facility]; has 3 sons - 52( lives in [place], 54, and 56;exercises minimally - limited due to back issues. Still drives. Does have a Living Will.     Social Determinants of Health     Physical Activity: Unknown    Days of Exercise per Week: Patient refused    Minutes of Exercise per Session: 10 min   Stress: No Stress Concern Present    Feeling of Stress : Not at all     Patient Active Problem List   Diagnosis    HTN (hypertension)    Mixed hyperlipidemia    Primary osteoarthritis involving multiple joints    Macular degeneration - Both Eyes    Fuch's endothelial dystrophy - Both Eyes    Lens replaced by other means    COAG (chronic open-angle glaucoma) - Both Eyes    Blepharitis, unspecified - Both Eyes    Shortness of breath    Abnormal ECG    PVC's (premature ventricular contractions)    Other microscopic hematuria    LVH (left ventricular hypertrophy) due to hypertensive disease    Osteopenia of hip    Myalgia    Calcification of aorta    Neuropathy    Unequal blood pressures in paired extremities    Medication side effects    History of cornea transplant    Pseudophakia    Insomnia    Anxiety    Pre-diabetes    Obesity (BMI 30.0-34.9)    Elevated troponin    Bilateral shoulder region arthritis    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    Anemia    Thrombus of left atrial appendage    PAF (paroxysmal atrial fibrillation)    Elevated liver enzymes    Atrial flutter with rapid ventricular response    Chronic anticoagulation    Keratitis sicca, bilateral    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    PAD (peripheral  artery disease)    Statin intolerance    Pulmonary nodules/lesions, multiple    Spinal stenosis at L4-L5 level    Pulmonary granuloma    Lumbar spondylosis    Bilateral recurrent inguinal hernia without obstruction or gangrene    LLQ abdominal pain    Pain in left leg    Rectal abnormality    Stenosis of carotid artery    Carotid artery calcification, bilateral    B12 deficiency    Seasonal allergic rhinitis    Atrial fibrillation with RVR    Elevated serum creatinine    Coagulopathy     Review of patient's allergies indicates:   Allergen Reactions    Carvedilol Swelling     lips  lips  Other reaction(s): swelling    Cephalexin Other (See Comments)     Muscle/joint weakness unable to move     Doxycycline calcium Other (See Comments)     Weakness nausea   sob  Other reaction(s): weakness, nausea, SOB    Erythromycin Other (See Comments)     Complete weakness/could not walk    Gadolinium-containing contrast media Other (See Comments)     angioedema  Other reaction(s): Angioedema    Hydrocodone-acetaminophen      wheezing  wheezing  wheezing  Other reaction(s): wheezing    Inveltys [loteprednol etabonate] Palpitations and Other (See Comments)     Patient stated bp went up.    Labetalol Shortness Of Breath, Nausea Only and Other (See Comments)     Palpitations, LE weakness  Other reaction(s): SOB, palpitations, weakness    Loratadine Other (See Comments)     Double vision/spots  Other reaction(s): double vision    Macrolide antibiotics Other (See Comments)     Complete weakness/could not walk  Complete weakness/could not walk  Complete weakness/could not walk  Complete weakness/could not walk  Other reaction(s): complete weakness    Moxifloxacin Other (See Comments)     Hives   muscle soreness  Other reaction(s): hives, muscle soreness    Naproxen      wheezing  wheezing  wheezing  Other reaction(s): wheezing    Statins-hmg-coa reductase inhibitors Other (See Comments)     Severe Muscle weakness  Muscle weakness  Severe  Muscle weakness  Other reaction(s): severe muscle weakness    Timolol Other (See Comments)     Blurred vision, Burning eyes, Severe muscle weakness, eye problems.  Other reaction(s): severe muscle weakness    Verapamil Diarrhea     Severe diarrhea.  Other reaction(s): diarrhea    Amlodipine      Myalgias    Other reaction(s): myalgian    Doxycycline     Doxycycline hyclate      Other reaction(s): weakness, nausea, SOB    Fenofibrate      Causes muscle weakness  Other reaction(s): muscle weakness    Hydralazine      Other reaction(s): muscle tremors    Hydralazine analogues      Muscle tremors/aches      Hydrocortisone     Isosorbide      Tolerates Imdur    Loteprednol      Other reaction(s): increased BP    Tetanus and diphtheria toxoids     Adhesive Rash     Clonidine patch - 2 mfg - contact dermatitis    Codeine Diarrhea and Other (See Comments)     Loss of balance   Other reaction(s): loss of balance    Doxazosin Palpitations     Other reaction(s): palpitations    Fexofenadine Other (See Comments)     Double vision/spots   Other reaction(s): double vision    Hydrocortisone (bulk) Other (See Comments)     Only suppository/ caused muscle weakness    Latanoprost Other (See Comments)     Muscle weakness  Other reaction(s): muscle weakness    Olopatadine Other (See Comments)     Muscle weakness  Other reaction(s): muscle weakness    Prednisone Anxiety       Medications:  Current Outpatient Medications on File Prior to Visit   Medication Sig Dispense Refill    acetaminophen (TYLENOL) 500 MG tablet Take 500 mg by mouth daily as needed. Taking 1 to 3      apixaban (ELIQUIS) 2.5 mg Tab Take 1 tablet (2.5 mg total) by mouth 2 (two) times daily. 180 tablet 3    b complex vitamins capsule Take 1 capsule by mouth once daily.      brinzolamide (AZOPT) 1 % ophthalmic suspension Place 1 drop into the left eye 2 (two) times a day. 5 mL 12    cholecalciferol, vitamin D3, (VITAMIN D3) 50 mcg (2,000 unit) Cap Take 1 capsule by mouth  once daily.      coenzyme Q10 200 mg capsule Take 400 mg by mouth once daily.       fish oil-omega-3 fatty acids 300-1,000 mg capsule Take 2 g by mouth 2 (two) times daily.       furosemide (LASIX) 40 MG tablet Take 1 tablet (40 mg total) by mouth once daily. Alternate - 40mg twice a day and 40mg daily 180 tablet 1    losartan (COZAAR) 25 MG tablet Take 1 tablet (25 mg total) by mouth once daily. 180 tablet 1    metoprolol tartrate (LOPRESSOR) 25 MG tablet Take 1 tablet (25 mg total) by mouth 2 (two) times daily. 180 tablet 1    POLYETHYLENE GLYCOL 3350 (MIRALAX ORAL) Take 17 g by mouth 2 (two) times a day. Taking BID      RHOPRESSA 0.02 % ophthalmic solution PLACE 1 DROP INTO THE LEFT EYE EVERY EVENING. 2.5 mL 11    TRAVATAN Z 0.004 % ophthalmic solution Place 1 drop into the left eye every evening. 2.5 mL 12    [DISCONTINUED] ALPRAZolam (XANAX) 0.25 MG tablet Take 1 tablet (0.25 mg total) by mouth nightly as needed for Insomnia or Anxiety. 30 tablet 2    [DISCONTINUED] cloNIDine (CATAPRES) 0.1 MG tablet Take 1 tablet (0.1 mg total) by mouth 2 (two) times daily as needed (systolic BP over 180). 20 tablet 1    [DISCONTINUED] isosorbide mononitrate (IMDUR) 30 MG 24 hr tablet Take 1 tablet (30 mg total) by mouth every evening. 90 tablet 1     Current Facility-Administered Medications on File Prior to Visit   Medication Dose Route Frequency Provider Last Rate Last Admin    sodium chloride 0.9% flush 3 mL  3 mL Intravenous PRN Steve Galarza PA-C        vancomycin in dextrose 5 % 1 gram/250 mL IVPB 1,000 mg  1,000 mg Intravenous On Call Procedure Vianney Fountain NP           Medications have been reviewed and reconciled with patient at visit today.    Barriers to medications present (no )    Fall since last office visit (yes )      Exam:  Vitals:    12/12/22 1328   BP: 122/76   Pulse: 62   Temp: 98 °F (36.7 °C)     Weight: 83.3 kg (183 lb 9.6 oz)   Body mass index is 31.51 kg/m².      BP Readings from Last 3  Encounters:   12/12/22 122/76   11/10/22 130/68   11/09/22 (!) 145/65     Wt Readings from Last 3 Encounters:   12/12/22 1328 83.3 kg (183 lb 9.6 oz)   11/10/22 1352 83.3 kg (183 lb 10.3 oz)   11/09/22 1103 83.4 kg (183 lb 12.8 oz)            Physical Exam    Laboratory Reviewed: (Yes)  Lab Results   Component Value Date    WBC 6.20 12/07/2022    HGB 12.8 12/07/2022    HCT 38.9 12/07/2022     12/07/2022    CHOL 168 08/31/2022    TRIG 98 08/31/2022    HDL 43 08/31/2022    ALT 10 12/07/2022    AST 17 12/07/2022     12/07/2022    K 3.7 12/07/2022     12/07/2022    CREATININE 1.7 (H) 12/07/2022    BUN 29 (H) 12/07/2022    CO2 25 12/07/2022    TSH 3.826 08/31/2022    INR 1.1 10/25/2022    HGBA1C 6.2 (H) 08/31/2022           Health Maintenance  Health Maintenance Topics with due status: Not Due       Topic Last Completion Date    Pneumococcal Vaccines (Age 65+) 03/16/2022    Hemoglobin A1c (Prediabetes) 08/31/2022    Lipid Panel 08/31/2022     Health Maintenance Due   Topic Date Due    Shingles Vaccine (1 of 2) Never done    TETANUS VACCINE  11/09/2021    COVID-19 Vaccine (4 - Booster for Pfizer series) 11/24/2021       Assessment:  Problem List Items Addressed This Visit          Psychiatric    Anxiety     Did not tolerate lower dose of alprazolam. Will refill.   Discussed risks/benefits.         Relevant Medications    ALPRAZolam (XANAX) 0.5 MG tablet       Cardiac/Vascular    Atrial flutter with rapid ventricular response     Remains on Eliquis.   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   Continue POC.              Plan:  Anxiety  -     ALPRAZolam (XANAX) 0.5 MG tablet; Take 1 tablet (0.5 mg total) by mouth nightly as needed for Anxiety.  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 Patient expressed understanding. Patient was given the opportunity to ask questions and be an active participant in their medical care. Patient had no further questions or concerns at this time.   -Documentation of patient's health and condition was obtained from family member who was present during visit.  -Patient is an overall moderate risk for health complications from their medical conditions.       Follow up: Follow up in about 3 months (around 3/12/2023) for HTN management.      After visit summary printed and given to patient upon discharge.  Patient goals and care plan are included in After visit summary.    Total medical decision making time was 34 min.  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    Health maintenance needs, recent test results and goals of care discussed with pt and questions answered.

## 2023-01-01 NOTE — PROGRESS NOTES
HPI     Glaucoma      Additional comments: 4 Week IOP Check              Comments     4 Week IOP Check, last visit changed lotemax to 4 days per week OU  Continue travatan z, rhopressa, systane.    Patient States Eyes have been dry     Lives at [facility] sx on 8/21/18  1. COAG Goal < 19  SLT OD 3/04 (20 to 15) + 3/11 (19 to 15)  SLT OS 5/05 (20 to 14) +5/11 (18-19 to 15) + 3/12 (min resp.) + 3/11/15   (20.5-17.5) + 3/7/18 (24- 21)  (Cosopt, Xalatan, and Timolol cause Myalgias)  (Alphagan causes redness) (zioptan too expensive)  2. PCIOL OU  3. Dry AMD  4. Fuch's Dystrophy   DSAEK OD 4/16 Dr. Quintanilla (followed by Dwight every summer)  DSAEK OS 8/15 Dr. Quintanilla  5. Dry Eye -intolerance to Restasis       Lotemax qd ou  Systane gel ou  Travatan Z qhs os  Rhopressa qhs OS          Last edited by Rajinder Ashraf, OD on 9/17/2018 11:10 AM. (History)            Assessment /Plan     For exam results, see Encounter Report.    Primary open angle glaucoma of both eyes, indeterminate stage      Slightly elevated IOP today    Continue lotemax every other day. Hx DSAEK OS 2015  Continue travatan z and rhopressa qhs OU  Continue systane prn    RTC 2 months IOP ck                  This note was copied from the mother's chart.  Inpatient Lactation Consult    Maternal history:  Past Medical History:   Diagnosis Date   • Asthma    • Gestational hypertension    • RAD (reactive airway disease)       No past surgical history on file.   OB History    Para Term  AB Living   3 2 0 2 1 2   SAB IAB Ectopic Molar Multiple Live Births   1 0 0 0 0 2      Current Facility-Administered Medications   Medication   • fluticasone propionate (FLOVENT HFA) 110 MCG/ACT inhaler 2 puff   • ondansetron (ZOFRAN) injection 4 mg   • oxyTOCIN (PITOCIN) injection 10 Units   • oxyTOCIN (PITOCIN) 30 Units in sodium chloride 0.9% 500 mL   • acetaminophen (TYLENOL) tablet 650 mg   • ibuprofen (MOTRIN) tablet 600 mg   • measles-mumps-rubella vaccine 0.5 mL   • varicella virus (VARIVAX) live vaccine 0.5 mL   • diphtheria-pertussis (acellular)-tetanus (BOOSTRIX) vaccine 0.5 mL   • hydroCORTisone-pramoxine (ANALPRAM-HC) 2.5-1 % rectal cream   • simethicone (MYLICON) tablet 125 mg   • calcium carbonate (TUMS) chewable tablet 500 mg   • [START ON 2023] NIFEdipine XL (PROCARDIA XL) ER tablet 60 mg   • albuterol inhaler 2 puff   • hydrALAZINE (APRESOLINE) tablet 10 mg   • magnesium sulfate 40 MG/ML infusion   • calcium gluconate 10 % injection 1 g   • labetalol (NORMODYNE) injection 20 mg   • labetalol (NORMODYNE) injection 40 mg   • labetalol (NORMODYNE) injection 80 mg   • albuterol (ACCUNEB) nebulizer 1.25 mg   • hydrALAZINE (APRESOLINE) injection 5 mg   • hydrALAZINE (APRESOLINE) injection 10 mg   • NIFEdipine (PROCARDIA) capsule 10 mg   • NIFEdipine (PROCARDIA) capsule 20 mg   • NIFEdipine (PROCARDIA) capsule 20 mg         P - Knowledge deficit r/t breastfeeding    I- Met with dyad on rounds. Baby was born at 36 weeks 4 days. Mother reports  just got fed a bottle. Reports pumping and mixed feeding for her first child for ~2 months, which was 36.3 and not wanting to latch. Discussed goals for breastfeeding  this baby. Identified that she would eventually like breast and formula feeding probably half and half. Discussed pumping while baby recovers and attempting to breastfeed as much as she wants, if baby seems interested in feeding she can always reach out to us through her nurse so we can come work with her. Or call us once she goes home. Pump set up in room and demonstration done at bedside for whenever she is ready to start pumping. Discussed proper flange fit and encouraged to call while pumping if she isn't sure if they fit correctly. Discussed typical  behavior vs late  infant behavior, skin to skin, stressed 8-12 feeding/pumping sessions in 24 hours, breast milk production, supply/demand, frequency/duration, feeding cues, stomach capacity, signs of effective breastfeeding, and monitoring  output. Encouraged rooming in and to call for assistance as needed. Education, handouts, and Lactation Department Helpline provided - encouraged to call  with any questions/concerns/need for assistance. Reports having a breast pump for home use. Questions answered. Nic WALLER updated.    E- Mother verbalized understanding of plan of care and instructions.    Plan - Will continue to follow as needed.

## 2023-01-04 ENCOUNTER — TELEPHONE (OUTPATIENT)
Dept: PRIMARY CARE CLINIC | Facility: CLINIC | Age: 88
End: 2023-01-04
Payer: MEDICARE

## 2023-01-04 NOTE — TELEPHONE ENCOUNTER
----- Message from Charlotte Jolly sent at 1/4/2023  1:20 PM CST -----  Contact: pt [phone]  Patient would like to know if she should come in at 2:00 today for an appt. Please call and advise.    Thank you and have a great day.

## 2023-01-04 NOTE — TELEPHONE ENCOUNTER
Patient called in stating that she has been having low heart rates that's been running in the 40s. Now it's 58. She also reports a tight feeling in her chest (on & off). She reports no other symptoms. She wants to be advised on what she should do?       Please Advise

## 2023-01-04 NOTE — TELEPHONE ENCOUNTER
HR in 60s currently. No CP. Advised to hold metoprolol unless HR in 90s or above. She has appt to be seen tomorrow.

## 2023-01-05 ENCOUNTER — TELEPHONE (OUTPATIENT)
Dept: PRIMARY CARE CLINIC | Facility: CLINIC | Age: 88
End: 2023-01-05
Payer: MEDICARE

## 2023-01-05 ENCOUNTER — OFFICE VISIT (OUTPATIENT)
Dept: PRIMARY CARE CLINIC | Facility: CLINIC | Age: 88
End: 2023-01-05
Payer: MEDICARE

## 2023-01-05 VITALS
TEMPERATURE: 98 F | WEIGHT: 187.81 LBS | BODY MASS INDEX: 32.06 KG/M2 | HEIGHT: 64 IN | HEART RATE: 74 BPM | OXYGEN SATURATION: 96 % | DIASTOLIC BLOOD PRESSURE: 60 MMHG | SYSTOLIC BLOOD PRESSURE: 139 MMHG

## 2023-01-05 DIAGNOSIS — I48.0 PAF (PAROXYSMAL ATRIAL FIBRILLATION): Primary | Chronic | ICD-10-CM

## 2023-01-05 DIAGNOSIS — I70.90 ATHEROMA OF ARTERY: ICD-10-CM

## 2023-01-05 PROCEDURE — 3288F PR FALLS RISK ASSESSMENT DOCUMENTED: ICD-10-PCS | Mod: HCNC,CPTII,S$GLB, | Performed by: NURSE PRACTITIONER

## 2023-01-05 PROCEDURE — 99999 PR PBB SHADOW E&M-EST. PATIENT-LVL IV: CPT | Mod: PBBFAC,HCNC,, | Performed by: NURSE PRACTITIONER

## 2023-01-05 PROCEDURE — 93005 EKG 12-LEAD: ICD-10-PCS | Mod: HCNC,S$GLB,, | Performed by: NURSE PRACTITIONER

## 2023-01-05 PROCEDURE — 93010 ELECTROCARDIOGRAM REPORT: CPT | Mod: HCNC,S$GLB,, | Performed by: STUDENT IN AN ORGANIZED HEALTH CARE EDUCATION/TRAINING PROGRAM

## 2023-01-05 PROCEDURE — 1125F PR PAIN SEVERITY QUANTIFIED, PAIN PRESENT: ICD-10-PCS | Mod: HCNC,CPTII,S$GLB, | Performed by: NURSE PRACTITIONER

## 2023-01-05 PROCEDURE — 1101F PT FALLS ASSESS-DOCD LE1/YR: CPT | Mod: HCNC,CPTII,S$GLB, | Performed by: NURSE PRACTITIONER

## 2023-01-05 PROCEDURE — 1160F RVW MEDS BY RX/DR IN RCRD: CPT | Mod: HCNC,CPTII,S$GLB, | Performed by: NURSE PRACTITIONER

## 2023-01-05 PROCEDURE — 3288F FALL RISK ASSESSMENT DOCD: CPT | Mod: HCNC,CPTII,S$GLB, | Performed by: NURSE PRACTITIONER

## 2023-01-05 PROCEDURE — 1159F PR MEDICATION LIST DOCUMENTED IN MEDICAL RECORD: ICD-10-PCS | Mod: HCNC,CPTII,S$GLB, | Performed by: NURSE PRACTITIONER

## 2023-01-05 PROCEDURE — 1101F PR PT FALLS ASSESS DOC 0-1 FALLS W/OUT INJ PAST YR: ICD-10-PCS | Mod: HCNC,CPTII,S$GLB, | Performed by: NURSE PRACTITIONER

## 2023-01-05 PROCEDURE — 99999 PR PBB SHADOW E&M-EST. PATIENT-LVL IV: ICD-10-PCS | Mod: PBBFAC,HCNC,, | Performed by: NURSE PRACTITIONER

## 2023-01-05 PROCEDURE — 1159F MED LIST DOCD IN RCRD: CPT | Mod: HCNC,CPTII,S$GLB, | Performed by: NURSE PRACTITIONER

## 2023-01-05 PROCEDURE — 99213 PR OFFICE/OUTPT VISIT, EST, LEVL III, 20-29 MIN: ICD-10-PCS | Mod: HCNC,S$GLB,, | Performed by: NURSE PRACTITIONER

## 2023-01-05 PROCEDURE — 99213 OFFICE O/P EST LOW 20 MIN: CPT | Mod: HCNC,S$GLB,, | Performed by: NURSE PRACTITIONER

## 2023-01-05 PROCEDURE — 93010 EKG 12-LEAD: ICD-10-PCS | Mod: HCNC,S$GLB,, | Performed by: STUDENT IN AN ORGANIZED HEALTH CARE EDUCATION/TRAINING PROGRAM

## 2023-01-05 PROCEDURE — 1125F AMNT PAIN NOTED PAIN PRSNT: CPT | Mod: HCNC,CPTII,S$GLB, | Performed by: NURSE PRACTITIONER

## 2023-01-05 PROCEDURE — 1160F PR REVIEW ALL MEDS BY PRESCRIBER/CLIN PHARMACIST DOCUMENTED: ICD-10-PCS | Mod: HCNC,CPTII,S$GLB, | Performed by: NURSE PRACTITIONER

## 2023-01-05 PROCEDURE — 93005 ELECTROCARDIOGRAM TRACING: CPT | Mod: HCNC,S$GLB,, | Performed by: NURSE PRACTITIONER

## 2023-01-05 RX ORDER — METOPROLOL TARTRATE 25 MG/1
12.5 TABLET, FILM COATED ORAL 2 TIMES DAILY
Qty: 30 TABLET | Refills: 11 | Status: ON HOLD | OUTPATIENT
Start: 2023-01-05 | End: 2023-01-12 | Stop reason: SDUPTHER

## 2023-01-05 NOTE — PROGRESS NOTES
Shirley Metz  01/05/2023  [id number]    Heather Miller NP  Patient Care Team:  Heather Miller NP as PCP - General (Family Medicine)  Wilbert Ware MD as Consulting Physician (Ophthalmology)  Rajinder Quintanilla MD as Consulting Physician (Ophthalmology)  Suzan Gregorio PA-C as Physician Assistant (Rheumatology)  Rosario Valadez MD as Consulting Physician (Dermatology)  Trevon Ramirez MD as Consulting Physician (Cardiology)  Amish Mendoza MD (Pulmonary Disease)  Jaquan Chio MD as Consulting Physician (Vascular Surgery)  Debbie Choi MD (Inactive) as Consulting Physician (Gynecology)  Emmanuel Fish MD as Consulting Physician (Hand Surgery)  PAXTON Chaidez Jr., MD as Consulting Physician (Orthopedic Surgery)  Phylicia Deleon MD as Physician (Internal Medicine)      [facility] Primary Care Note      Chief Complaint:  Chief Complaint   Patient presents with    Low heart rate & low B/P       History of Present Illness:  HPI    Low HR yesterday, 40 yesterday. Felt weak and dizzy then. Metoprolol placed on hold.   Feels better today. Sleeps well at night. Eye appt next week f/u new gtts for glaucoma.     Good appetite. Feels much better today overall.               Review of Systems   Constitutional:  Negative for chills, fever and malaise/fatigue.   Respiratory:  Negative for cough and shortness of breath.    Cardiovascular:  Negative for chest pain, palpitations, orthopnea and leg swelling.   Gastrointestinal:  Negative for constipation, diarrhea, heartburn, nausea and vomiting.   Neurological:  Negative for dizziness.       The following were reviewed: Active problem list, medication list, allergies, family history, social history, and Health Maintenance.     History:  Past Medical History:   Diagnosis Date    Anemia 11/29/2018    Anxiety 11/28/2017    Arthritis     Atrial fibrillation with RVR 10/9/2019    Back pain     Basal cell carcinoma 03/29/2018    left mid back     Chronic diastolic congestive heart failure 10/15/2019    CKD (chronic kidney disease) stage 3, GFR 30-59 ml/min 8/8/2016    Colon polyps     reported per patient.     Coronary artery calcification 10/5/2021    Fuchs' corneal dystrophy     General anesthetics causing adverse effect in therapeutic use     woke up during surgery and gagged on ET tube    Glaucoma     Glaucoma     Heart failure with preserved left ventricular function (HFpEF) 7/24/2018    Hyperlipidemia     Hypertension     Macular degeneration dry    Obesity     PVD (peripheral vascular disease) 4/26/2021    PVD (peripheral vascular disease) 4/26/2021    Squamous cell carcinoma 01/08/2019    left shoulder    Stenosis of carotid artery 10/5/2021    Trouble in sleeping     Type 2 diabetes mellitus without complication, without long-term current use of insulin 2/24/2021    Urinary tract infection      Past Surgical History:   Procedure Laterality Date    ABLATION OF ARRHYTHMOGENIC FOCUS FOR ATRIAL FIBRILLATION N/A 11/3/2022    Procedure: Ablation atrial fibrillation;  Surgeon: Toi Kelly MD;  Location: [facility] LAB;  Service: Cardiology;  Laterality: N/A;  afib, PVI/CTI, RFA, STACEY (cx if SR), DEDE, anes, MB, 3 Prep    ADENOIDECTOMY  1938    BREAST BIOPSY Left     1997. benign    BREAST CYST EXCISION Left 1997 approx    CARPAL TUNNEL RELEASE Right 2012 approx    CATARACT EXTRACTION Bilateral 1994    CHOLECYSTECTOMY  1975    CORNEAL TRANSPLANT Bilateral 08/2015 8/2015 - left; Right 4/2016    EYE SURGERY      Fuch's Corneal dystrophy - had 2nd operation with Dr. Quintanilla    EYE SURGERY      Cataract wIOL    left thumb Left 2011    removed cuboid for arthritis    REVERSE TOTAL SHOULDER ARTHROPLASTY Left 8/21/2018    Procedure: ARTHROPLASTY, SHOULDER, TOTAL, REVERSE;  Surgeon: Solomon Lujan MD;  Location: [facility];  Service: Orthopedics;  Laterality: Left;  WITH BICEP TENODESIS    SKIN CANCER EXCISION Left 2017    BCC removal by Dr. Valadez    SLT- OS  (aka TRABECULOPLASTY) Left 05/11/2011    repeat 3/14/12    TENOTOMY Left 8/21/2018    Procedure: TENOTOMY;  Surgeon: Solomon Lujan MD;  Location: [facility] OR;  Service: Orthopedics;  Laterality: Left;    TONSILLECTOMY  1938    TREATMENT OF CARDIAC ARRHYTHMIA N/A 10/10/2019    Procedure: CARDIOVERSION;  Surgeon: Pete Durán MD;  Location: [facility] CATH LAB;  Service: Cardiology;  Laterality: N/A;    TREATMENT OF CARDIAC ARRHYTHMIA N/A 12/6/2019    Procedure: CARDIOVERSION;  Surgeon: Samy Castillo MD;  Location: [facility] CATH LAB;  Service: Cardiology;  Laterality: N/A;     Family History   Problem Relation Age of Onset    Heart disease Mother     Hypertension Mother     Cancer Father 88        sarcoma( ?Kaposi's ) on leg    Deep vein thrombosis Neg Hx     Ovarian cancer Neg Hx     Breast cancer Neg Hx     Kidney disease Neg Hx     Strabismus Neg Hx     Retinal detachment Neg Hx     Macular degeneration Neg Hx     Glaucoma Neg Hx     Blindness Neg Hx     Amblyopia Neg Hx     Eczema Neg Hx     Lupus Neg Hx     Melanoma Neg Hx     Psoriasis Neg Hx     Diabetes Neg Hx     Stroke Neg Hx     Mental retardation Neg Hx     Mental illness Neg Hx     Hyperlipidemia Neg Hx     COPD Neg Hx     Asthma Neg Hx     Depression Neg Hx     Alcohol abuse Neg Hx     Drug abuse Neg Hx      Social History     Socioeconomic History    Marital status:     Number of children: 3   Tobacco Use    Smoking status: Never    Smokeless tobacco: Never    Tobacco comments:     history of passive smoking from her ex-   Substance and Sexual Activity    Alcohol use: Yes     Alcohol/week: 2.0 standard drinks     Types: 2 Standard drinks or equivalent per week     Comment: occ    Drug use: No    Sexual activity: Not Currently     Partners: Male     Birth control/protection: Post-menopausal     Comment: discussed protection for STD's   Other Topics Concern    Are you pregnant or think you may be? No    Breast-feeding No   Social History Narrative      x 2,  x 1. Retired artist - previously doing jewelry/wall pieces; ; lives in [place]; has 3 sons - 52( lives in [place], 54, and 56;exercises minimally - limited due to back issues. Still drives. Does have a Living Will.     Social Determinants of Health     Physical Activity: Unknown    Days of Exercise per Week: Patient refused    Minutes of Exercise per Session: 10 min   Stress: No Stress Concern Present    Feeling of Stress : Not at all     Patient Active Problem List   Diagnosis    HTN (hypertension)    Mixed hyperlipidemia    Primary osteoarthritis involving multiple joints    Macular degeneration - Both Eyes    Fuch's endothelial dystrophy - Both Eyes    Lens replaced by other means    COAG (chronic open-angle glaucoma) - Both Eyes    Blepharitis, unspecified - Both Eyes    Shortness of breath    Abnormal ECG    PVC's (premature ventricular contractions)    Other microscopic hematuria    LVH (left ventricular hypertrophy) due to hypertensive disease    Osteopenia of hip    Myalgia    Calcification of aorta    Neuropathy    Unequal blood pressures in paired extremities    Medication side effects    History of cornea transplant    Pseudophakia    Insomnia    Anxiety    Pre-diabetes    Obesity (BMI 30.0-34.9)    Elevated troponin    Bilateral shoulder region arthritis    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    Anemia    Thrombus of left atrial appendage    PAF (paroxysmal atrial fibrillation)    Elevated liver enzymes    Atrial flutter with rapid ventricular response    Chronic anticoagulation    Keratitis sicca, bilateral    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    PAD (peripheral artery disease)    Statin intolerance    Pulmonary nodules/lesions, multiple    Spinal stenosis at L4-L5 level    Pulmonary granuloma    Lumbar spondylosis    Bilateral recurrent inguinal hernia without obstruction or gangrene    LLQ  abdominal pain    Pain in left leg    Rectal abnormality    Stenosis of carotid artery    Carotid artery calcification, bilateral    B12 deficiency    Seasonal allergic rhinitis    Atrial fibrillation with RVR    Elevated serum creatinine    Coagulopathy    Atheroma of artery     Review of patient's allergies indicates:   Allergen Reactions    Carvedilol Swelling     lips  lips  Other reaction(s): swelling    Cephalexin Other (See Comments)     Muscle/joint weakness unable to move     Doxycycline calcium Other (See Comments)     Weakness nausea   sob  Other reaction(s): weakness, nausea, SOB    Erythromycin Other (See Comments)     Complete weakness/could not walk    Gadolinium-containing contrast media Other (See Comments)     angioedema  Other reaction(s): Angioedema    Hydrocodone-acetaminophen      wheezing  wheezing  wheezing  Other reaction(s): wheezing    Inveltys [loteprednol etabonate] Palpitations and Other (See Comments)     Patient stated bp went up.    Labetalol Shortness Of Breath, Nausea Only and Other (See Comments)     Palpitations, LE weakness  Other reaction(s): SOB, palpitations, weakness    Loratadine Other (See Comments)     Double vision/spots  Other reaction(s): double vision    Macrolide antibiotics Other (See Comments)     Complete weakness/could not walk  Complete weakness/could not walk  Complete weakness/could not walk  Complete weakness/could not walk  Other reaction(s): complete weakness    Moxifloxacin Other (See Comments)     Hives   muscle soreness  Other reaction(s): hives, muscle soreness    Naproxen      wheezing  wheezing  wheezing  Other reaction(s): wheezing    Statins-hmg-coa reductase inhibitors Other (See Comments)     Severe Muscle weakness  Muscle weakness  Severe Muscle weakness  Other reaction(s): severe muscle weakness    Timolol Other (See Comments)     Blurred vision, Burning eyes, Severe muscle weakness, eye problems.  Other reaction(s): severe muscle weakness     Verapamil Diarrhea     Severe diarrhea.  Other reaction(s): diarrhea    Amlodipine      Myalgias    Other reaction(s): myalgian    Doxycycline     Doxycycline hyclate      Other reaction(s): weakness, nausea, SOB    Fenofibrate      Causes muscle weakness  Other reaction(s): muscle weakness    Hydralazine      Other reaction(s): muscle tremors    Hydralazine analogues      Muscle tremors/aches      Hydrocortisone     Isosorbide      Tolerates Imdur    Loteprednol      Other reaction(s): increased BP    Tetanus and diphtheria toxoids     Adhesive Rash     Clonidine patch - 2 mfg - contact dermatitis    Codeine Diarrhea and Other (See Comments)     Loss of balance   Other reaction(s): loss of balance    Doxazosin Palpitations     Other reaction(s): palpitations    Fexofenadine Other (See Comments)     Double vision/spots   Other reaction(s): double vision    Hydrocortisone (bulk) Other (See Comments)     Only suppository/ caused muscle weakness    Latanoprost Other (See Comments)     Muscle weakness  Other reaction(s): muscle weakness    Olopatadine Other (See Comments)     Muscle weakness  Other reaction(s): muscle weakness    Prednisone Anxiety       Medications:  Current Outpatient Medications on File Prior to Visit   Medication Sig Dispense Refill    acetaminophen (TYLENOL) 500 MG tablet Take 500 mg by mouth daily as needed. Taking 1 to 3      ALPRAZolam (XANAX) 0.5 MG tablet Take 1 tablet (0.5 mg total) by mouth nightly as needed for Anxiety. 30 tablet 5    apixaban (ELIQUIS) 2.5 mg Tab Take 1 tablet (2.5 mg total) by mouth 2 (two) times daily. 180 tablet 3    b complex vitamins capsule Take 1 capsule by mouth once daily.      brinzolamide (AZOPT) 1 % ophthalmic suspension Place 1 drop into the left eye 2 (two) times a day. 5 mL 12    cholecalciferol, vitamin D3, (VITAMIN D3) 50 mcg (2,000 unit) Cap Take 1 capsule by mouth once daily.      coenzyme Q10 200 mg capsule Take 400 mg by mouth once daily.       fish  oil-omega-3 fatty acids 300-1,000 mg capsule Take 2 g by mouth 2 (two) times daily.       furosemide (LASIX) 40 MG tablet Take 1 tablet (40 mg total) by mouth once daily. Alternate - 40mg twice a day and 40mg daily 180 tablet 1    losartan (COZAAR) 25 MG tablet Take 1 tablet (25 mg total) by mouth once daily. 180 tablet 1    POLYETHYLENE GLYCOL 3350 (MIRALAX ORAL) Take 17 g by mouth 2 (two) times a day. Taking BID      RHOPRESSA 0.02 % ophthalmic solution PLACE 1 DROP INTO THE LEFT EYE EVERY EVENING. 2.5 mL 11    TRAVATAN Z 0.004 % ophthalmic solution Place 1 drop into the left eye every evening. 2.5 mL 12    [DISCONTINUED] cloNIDine (CATAPRES) 0.1 MG tablet Take 1 tablet (0.1 mg total) by mouth 2 (two) times daily as needed (systolic BP over 180). 20 tablet 1    [DISCONTINUED] isosorbide mononitrate (IMDUR) 30 MG 24 hr tablet Take 1 tablet (30 mg total) by mouth every evening. 90 tablet 1    [DISCONTINUED] metoprolol tartrate (LOPRESSOR) 25 MG tablet Take 1 tablet (25 mg total) by mouth 2 (two) times daily. (Patient not taking: Reported on 1/5/2023) 180 tablet 1     Current Facility-Administered Medications on File Prior to Visit   Medication Dose Route Frequency Provider Last Rate Last Admin    sodium chloride 0.9% flush 3 mL  3 mL Intravenous PRN Steve Galarza PA-C        vancomycin in dextrose 5 % 1 gram/250 mL IVPB 1,000 mg  1,000 mg Intravenous On Call Procedure Vianney Fountain NP           Medications have been reviewed and reconciled with patient at visit today.    Barriers to medications present (no )    Fall since last office visit (no )      Exam:  Vitals:    01/05/23 0958   BP: 139/60   Pulse: 74   Temp: 98.1 °F (36.7 °C)     Weight: 85.2 kg (187 lb 12.8 oz)   Body mass index is 32.24 kg/m².      BP Readings from Last 3 Encounters:   01/05/23 139/60   12/12/22 122/76   11/10/22 130/68     Wt Readings from Last 3 Encounters:   01/05/23 0958 85.2 kg (187 lb 12.8 oz)   12/12/22 1328 83.3 kg (183  lb 9.6 oz)   11/10/22 1352 83.3 kg (183 lb 10.3 oz)            Physical Exam  Constitutional:       General: She is not in acute distress.     Appearance: She is not ill-appearing.   HENT:      Right Ear: Tympanic membrane normal.      Left Ear: Tympanic membrane normal.      Nose: Nose normal.      Mouth/Throat:      Mouth: Mucous membranes are moist.      Pharynx: No oropharyngeal exudate or posterior oropharyngeal erythema.   Eyes:      General: No scleral icterus.  Cardiovascular:      Rate and Rhythm: Normal rate. Rhythm irregular.      Heart sounds: Normal heart sounds.   Pulmonary:      Effort: Pulmonary effort is normal.      Breath sounds: Normal breath sounds.   Abdominal:      General: There is no distension.      Palpations: Abdomen is soft.      Tenderness: There is no abdominal tenderness.   Musculoskeletal:         General: No swelling.   Lymphadenopathy:      Cervical: No cervical adenopathy.   Skin:     General: Skin is warm and dry.   Neurological:      Mental Status: She is alert. Mental status is at baseline.   Psychiatric:         Mood and Affect: Mood normal.         Behavior: Behavior normal.       Laboratory Reviewed: (Yes)  Lab Results   Component Value Date    WBC 6.20 12/07/2022    HGB 12.8 12/07/2022    HCT 38.9 12/07/2022     12/07/2022    CHOL 168 08/31/2022    TRIG 98 08/31/2022    HDL 43 08/31/2022    ALT 10 12/07/2022    AST 17 12/07/2022     12/07/2022    K 3.7 12/07/2022     12/07/2022    CREATININE 1.7 (H) 12/07/2022    BUN 29 (H) 12/07/2022    CO2 25 12/07/2022    TSH 3.826 08/31/2022    INR 1.1 10/25/2022    HGBA1C 6.2 (H) 08/31/2022           Health Maintenance  Health Maintenance Topics with due status: Not Due       Topic Last Completion Date    Pneumococcal Vaccines (Age 65+) 03/16/2022    Hemoglobin A1c (Prediabetes) 08/31/2022    Lipid Panel 08/31/2022     Health Maintenance Due   Topic Date Due    Shingles Vaccine (1 of 2) Never done    TETANUS VACCINE   11/09/2021    COVID-19 Vaccine (4 - Booster for Pfizer series) 11/24/2021       Assessment:  Problem List Items Addressed This Visit          Cardiac/Vascular    PAF (paroxysmal atrial fibrillation) - Primary (Chronic)     EKG today reveals NSR with PVC today.   Symptomatic bradycardia yesterday.   Decrease metoprolol 25mg to 1/2 tablet BID.          Relevant Medications    metoprolol tartrate (LOPRESSOR) 25 MG tablet    Other Relevant Orders    IN OFFICE EKG 12-LEAD (to Muse)    Atheroma of artery     Seen on STACEY Nov 22. Continues Eliquis              Plan:  PAF (paroxysmal atrial fibrillation)  -     IN OFFICE EKG 12-LEAD (to Muse)  -     metoprolol tartrate (LOPRESSOR) 25 MG tablet; Take 0.5 tablets (12.5 mg total) by mouth 2 (two) times daily.  Dispense: 30 tablet; Refill: 11    Atheroma of artery      -Patient's lab results were reviewed and discussed with patient  -Treatment options and alternatives were discussed with the patient. Patient expressed understanding. Patient was given the opportunity to ask questions and be an active participant in their medical care. Patient had no further questions or concerns at this time.   -Documentation of patient's health and condition was obtained from family member who was present during visit.  -Patient is an overall moderate risk for health complications from their medical conditions.       Follow up: Follow up in about 2 months (around 3/5/2023) for Medication Change.      After visit summary printed and given to patient upon discharge.  Patient goals and care plan are included in After visit summary.    Total medical decision making time was 25 min.  The following issues were discussed: The primary encounter diagnosis was PAF (paroxysmal atrial fibrillation). A diagnosis of Atheroma of artery was also pertinent to this visit.    Health maintenance needs, recent test results and goals of care discussed with pt and questions answered.

## 2023-01-05 NOTE — PROGRESS NOTES
Patient, Shirley Metz (MRN #[id number]), presented with a recent Estimated Glumerular Filtration Rate (EGFR) between 15 and 29 consistent with the definition of chronic kidney disease stage 4 (ICD10 - N18.4).    eGFR if non    Date Value Ref Range Status   07/26/2022 26 (A) >60 mL/min/1.73 m^2 Final     Comment:     Calculation used to obtain the estimated glomerular filtration  rate (eGFR) is the CKD-EPI equation.          The patient's chronic kidney disease stage 4 was monitored, evaluated, addressed and/or treated. This addendum to the medical record is made on 01/05/2023.

## 2023-01-05 NOTE — ASSESSMENT & PLAN NOTE
EKG today reveals NSR with PVC today.   Symptomatic bradycardia yesterday.   Decrease metoprolol 25mg to 1/2 tablet BID.

## 2023-01-09 ENCOUNTER — TELEPHONE (OUTPATIENT)
Dept: CARDIOLOGY | Facility: CLINIC | Age: 88
End: 2023-01-09
Payer: MEDICARE

## 2023-01-09 LAB — ANA SER QL IA: NORMAL

## 2023-01-09 NOTE — TELEPHONE ENCOUNTER
Spoke with pt in regards to having an increase in   Hr.  Pt stated her HR been runnning 103-125 at rest.    Pt stated that her SOB has worsen again with weakness but it comes and goes.    Pt denied chest pain, sob or palpations currently        Pt stated her pcp changed the metoprolol to 12.5 mg 2x daily.         Please advise.

## 2023-01-10 ENCOUNTER — OFFICE VISIT (OUTPATIENT)
Dept: PRIMARY CARE CLINIC | Facility: CLINIC | Age: 88
End: 2023-01-10
Payer: MEDICARE

## 2023-01-10 ENCOUNTER — HOSPITAL ENCOUNTER (OUTPATIENT)
Dept: RADIOLOGY | Facility: HOSPITAL | Age: 88
Discharge: HOME | End: 2023-01-10
Attending: NURSE PRACTITIONER
Payer: MEDICARE

## 2023-01-10 ENCOUNTER — OFFICE VISIT (OUTPATIENT)
Dept: OPHTHALMOLOGY | Facility: CLINIC | Age: 88
End: 2023-01-10
Payer: MEDICARE

## 2023-01-10 VITALS
WEIGHT: 187.38 LBS | BODY MASS INDEX: 32.17 KG/M2 | OXYGEN SATURATION: 97 % | SYSTOLIC BLOOD PRESSURE: 125 MMHG | DIASTOLIC BLOOD PRESSURE: 76 MMHG | TEMPERATURE: 98 F | HEART RATE: 89 BPM

## 2023-01-10 DIAGNOSIS — H52.7 REFRACTIVE ERROR: ICD-10-CM

## 2023-01-10 DIAGNOSIS — I48.91 ATRIAL FIBRILLATION WITH RVR: ICD-10-CM

## 2023-01-10 DIAGNOSIS — I48.0 PAF (PAROXYSMAL ATRIAL FIBRILLATION): Primary | Chronic | ICD-10-CM

## 2023-01-10 DIAGNOSIS — R06.00 DYSPNEA, UNSPECIFIED TYPE: ICD-10-CM

## 2023-01-10 DIAGNOSIS — H40.1131 PRIMARY OPEN ANGLE GLAUCOMA (POAG) OF BOTH EYES, MILD STAGE: Primary | ICD-10-CM

## 2023-01-10 DIAGNOSIS — R06.02 SHORTNESS OF BREATH: Primary | ICD-10-CM

## 2023-01-10 DIAGNOSIS — R06.02 SHORTNESS OF BREATH: ICD-10-CM

## 2023-01-10 DIAGNOSIS — Z96.1 PSEUDOPHAKIA: ICD-10-CM

## 2023-01-10 DIAGNOSIS — M35.01 KERATITIS SICCA, BILATERAL: ICD-10-CM

## 2023-01-10 LAB
ALBUMIN SERPL BCP-MCNC: 3.7 G/DL (ref 3.5–5.2)
ALP SERPL-CCNC: 76 U/L (ref 55–135)
ALT SERPL W/O P-5'-P-CCNC: 17 U/L (ref 10–44)
ANION GAP SERPL CALC-SCNC: 13 MMOL/L (ref 8–16)
AST SERPL-CCNC: 20 U/L (ref 10–40)
BASOPHILS # BLD AUTO: 0.04 K/UL (ref 0–0.2)
BASOPHILS NFR BLD: 0.6 % (ref 0–1.9)
BILIRUB SERPL-MCNC: 0.4 MG/DL (ref 0.1–1)
BNP SERPL-MCNC: 558 PG/ML (ref 0–99)
BUN SERPL-MCNC: 43 MG/DL (ref 8–23)
CALCIUM SERPL-MCNC: 9.8 MG/DL (ref 8.7–10.5)
CHLORIDE SERPL-SCNC: 105 MMOL/L (ref 95–110)
CO2 SERPL-SCNC: 24 MMOL/L (ref 23–29)
CREAT SERPL-MCNC: 2 MG/DL (ref 0.5–1.4)
DIFFERENTIAL METHOD: ABNORMAL
EOSINOPHIL # BLD AUTO: 0.2 K/UL (ref 0–0.5)
EOSINOPHIL NFR BLD: 2.5 % (ref 0–8)
ERYTHROCYTE [DISTWIDTH] IN BLOOD BY AUTOMATED COUNT: 13.5 % (ref 11.5–14.5)
EST. GFR  (NO RACE VARIABLE): 23 ML/MIN/1.73 M^2
GLUCOSE SERPL-MCNC: 107 MG/DL (ref 70–110)
HCT VFR BLD AUTO: 35.5 % (ref 37–48.5)
HGB BLD-MCNC: 11.4 G/DL (ref 12–16)
IMM GRANULOCYTES # BLD AUTO: 0.03 K/UL (ref 0–0.04)
IMM GRANULOCYTES NFR BLD AUTO: 0.5 % (ref 0–0.5)
LYMPHOCYTES # BLD AUTO: 1.5 K/UL (ref 1–4.8)
LYMPHOCYTES NFR BLD: 22.6 % (ref 18–48)
MCH RBC QN AUTO: 32.7 PG (ref 27–31)
MCHC RBC AUTO-ENTMCNC: 32.1 G/DL (ref 32–36)
MCV RBC AUTO: 102 FL (ref 82–98)
MONOCYTES # BLD AUTO: 0.8 K/UL (ref 0.3–1)
MONOCYTES NFR BLD: 11.7 % (ref 4–15)
NEUTROPHILS # BLD AUTO: 4 K/UL (ref 1.8–7.7)
NEUTROPHILS NFR BLD: 62.1 % (ref 38–73)
NRBC BLD-RTO: 0 /100 WBC
PLATELET # BLD AUTO: 207 K/UL (ref 150–450)
PMV BLD AUTO: 9.9 FL (ref 9.2–12.9)
POTASSIUM SERPL-SCNC: 4.2 MMOL/L (ref 3.5–5.1)
PROT SERPL-MCNC: 7.5 G/DL (ref 6–8.4)
RBC # BLD AUTO: 3.49 M/UL (ref 4–5.4)
SODIUM SERPL-SCNC: 142 MMOL/L (ref 136–145)
TROPONIN I SERPL DL<=0.01 NG/ML-MCNC: 0.19 NG/ML (ref 0–0.03)
WBC # BLD AUTO: 6.42 K/UL (ref 3.9–12.7)

## 2023-01-10 PROCEDURE — 1159F PR MEDICATION LIST DOCUMENTED IN MEDICAL RECORD: ICD-10-PCS | Mod: HCNC,CPTII,S$GLB, | Performed by: NURSE PRACTITIONER

## 2023-01-10 PROCEDURE — 1159F MED LIST DOCD IN RCRD: CPT | Mod: HCNC,CPTII,S$GLB, | Performed by: OPHTHALMOLOGY

## 2023-01-10 PROCEDURE — 1160F RVW MEDS BY RX/DR IN RCRD: CPT | Mod: HCNC,CPTII,S$GLB, | Performed by: OPHTHALMOLOGY

## 2023-01-10 PROCEDURE — 99215 PR OFFICE/OUTPT VISIT, EST, LEVL V, 40-54 MIN: ICD-10-PCS | Mod: HCNC,S$GLB,, | Performed by: NURSE PRACTITIONER

## 2023-01-10 PROCEDURE — 1160F PR REVIEW ALL MEDS BY PRESCRIBER/CLIN PHARMACIST DOCUMENTED: ICD-10-PCS | Mod: HCNC,CPTII,S$GLB, | Performed by: OPHTHALMOLOGY

## 2023-01-10 PROCEDURE — 99214 PR OFFICE/OUTPT VISIT, EST, LEVL IV, 30-39 MIN: ICD-10-PCS | Mod: HCNC,S$GLB,, | Performed by: OPHTHALMOLOGY

## 2023-01-10 PROCEDURE — 3288F PR FALLS RISK ASSESSMENT DOCUMENTED: ICD-10-PCS | Mod: HCNC,CPTII,S$GLB, | Performed by: NURSE PRACTITIONER

## 2023-01-10 PROCEDURE — 99999 PR PBB SHADOW E&M-EST. PATIENT-LVL III: ICD-10-PCS | Mod: PBBFAC,HCNC,, | Performed by: OPHTHALMOLOGY

## 2023-01-10 PROCEDURE — 1125F AMNT PAIN NOTED PAIN PRSNT: CPT | Mod: HCNC,CPTII,S$GLB, | Performed by: NURSE PRACTITIONER

## 2023-01-10 PROCEDURE — 93010 ELECTROCARDIOGRAM REPORT: CPT | Mod: HCNC,S$GLB,, | Performed by: INTERNAL MEDICINE

## 2023-01-10 PROCEDURE — 1159F PR MEDICATION LIST DOCUMENTED IN MEDICAL RECORD: ICD-10-PCS | Mod: HCNC,CPTII,S$GLB, | Performed by: OPHTHALMOLOGY

## 2023-01-10 PROCEDURE — 83880 ASSAY OF NATRIURETIC PEPTIDE: CPT | Mod: HCNC | Performed by: PHYSICIAN ASSISTANT

## 2023-01-10 PROCEDURE — 3288F FALL RISK ASSESSMENT DOCD: CPT | Mod: HCNC,CPTII,S$GLB, | Performed by: NURSE PRACTITIONER

## 2023-01-10 PROCEDURE — 93010 EKG 12-LEAD: ICD-10-PCS | Mod: HCNC,S$GLB,, | Performed by: INTERNAL MEDICINE

## 2023-01-10 PROCEDURE — 84484 ASSAY OF TROPONIN QUANT: CPT | Mod: HCNC | Performed by: PHYSICIAN ASSISTANT

## 2023-01-10 PROCEDURE — 99999 PR PBB SHADOW E&M-EST. PATIENT-LVL IV: CPT | Mod: PBBFAC,HCNC,, | Performed by: NURSE PRACTITIONER

## 2023-01-10 PROCEDURE — 99215 OFFICE O/P EST HI 40 MIN: CPT | Mod: HCNC,S$GLB,, | Performed by: NURSE PRACTITIONER

## 2023-01-10 PROCEDURE — 71046 XR CHEST PA AND LATERAL: ICD-10-PCS | Mod: 26,HCNC,, | Performed by: RADIOLOGY

## 2023-01-10 PROCEDURE — 92015 PR REFRACTION: ICD-10-PCS | Mod: HCNC,S$GLB,, | Performed by: OPHTHALMOLOGY

## 2023-01-10 PROCEDURE — 71046 X-RAY EXAM CHEST 2 VIEWS: CPT | Mod: TC,HCNC

## 2023-01-10 PROCEDURE — 71046 X-RAY EXAM CHEST 2 VIEWS: CPT | Mod: 26,HCNC,, | Performed by: RADIOLOGY

## 2023-01-10 PROCEDURE — 1101F PR PT FALLS ASSESS DOC 0-1 FALLS W/OUT INJ PAST YR: ICD-10-PCS | Mod: HCNC,CPTII,S$GLB, | Performed by: NURSE PRACTITIONER

## 2023-01-10 PROCEDURE — 99999 PR PBB SHADOW E&M-EST. PATIENT-LVL IV: ICD-10-PCS | Mod: PBBFAC,HCNC,, | Performed by: NURSE PRACTITIONER

## 2023-01-10 PROCEDURE — 85025 COMPLETE CBC W/AUTO DIFF WBC: CPT | Mod: HCNC | Performed by: PHYSICIAN ASSISTANT

## 2023-01-10 PROCEDURE — 99999 PR PBB SHADOW E&M-EST. PATIENT-LVL III: CPT | Mod: PBBFAC,HCNC,, | Performed by: OPHTHALMOLOGY

## 2023-01-10 PROCEDURE — 1101F PT FALLS ASSESS-DOCD LE1/YR: CPT | Mod: HCNC,CPTII,S$GLB, | Performed by: NURSE PRACTITIONER

## 2023-01-10 PROCEDURE — 1159F MED LIST DOCD IN RCRD: CPT | Mod: HCNC,CPTII,S$GLB, | Performed by: NURSE PRACTITIONER

## 2023-01-10 PROCEDURE — 99214 OFFICE O/P EST MOD 30 MIN: CPT | Mod: HCNC,S$GLB,, | Performed by: OPHTHALMOLOGY

## 2023-01-10 PROCEDURE — 93005 ELECTROCARDIOGRAM TRACING: CPT | Mod: HCNC

## 2023-01-10 PROCEDURE — 99291 CRITICAL CARE FIRST HOUR: CPT | Mod: 25,HCNC

## 2023-01-10 PROCEDURE — 96376 TX/PRO/DX INJ SAME DRUG ADON: CPT | Mod: HCNC

## 2023-01-10 PROCEDURE — 92015 DETERMINE REFRACTIVE STATE: CPT | Mod: HCNC,S$GLB,, | Performed by: OPHTHALMOLOGY

## 2023-01-10 PROCEDURE — 80053 COMPREHEN METABOLIC PANEL: CPT | Mod: HCNC | Performed by: PHYSICIAN ASSISTANT

## 2023-01-10 PROCEDURE — 1125F PR PAIN SEVERITY QUANTIFIED, PAIN PRESENT: ICD-10-PCS | Mod: HCNC,CPTII,S$GLB, | Performed by: NURSE PRACTITIONER

## 2023-01-10 PROCEDURE — 96374 THER/PROPH/DIAG INJ IV PUSH: CPT | Mod: HCNC

## 2023-01-10 NOTE — PROGRESS NOTES
SUBJECTIVE  AdrianaIfrah Metz is 89 y.o. female  Corrected distance visual acuity was 20/40 +1 in the right eye and 20/30 -2 in the left eye.   Chief Complaint   Patient presents with    Glaucoma     Pt here for Azopt BID OS trial. No pain or discomfort. VA stable. 100% compliant with gtts.           HPI     Glaucoma     Additional comments: Pt here for Azopt BID OS trial. No pain or   discomfort. VA stable. 100% compliant with gtts.            Comments    1. Mild COAG Goal < 19   SLT OD 3/04 (20 to 15) + 3/11 (19 to 15)   SLT OS 5/05 (20 to 14) +5/11 (18-19 to 15) + 3/12 (min resp.) + 3/11/15   (20.5-17.5) + 3/7/18 (24- 21) + 11/3/21 (22.5-17.5)  (Cosopt, Xalatan, and Timolol cause Myalgias)   (Alphagan causes redness) (zioptan too expensive)   Possible steroid responder   2. PCIOL OU   3. Mild Dry AMD   4. Fuch's Dystrophy   DSAEK OD 4/16 Dr. Quintanilla (followed by [facility] every summer)   DSAEK OS 8/15 Dr. Quintanilla   Rx Inveltys (1% lotemax) (IOP 15/23) 5/29/20   Dr. Quintanilla Ok'd to stop Lotemax OU  5. Dry Eye -intolerance to Restasis   6. Recent A-Fib. (from Labetolol ?)       Systane Ultra 4-5 tiimes daily   Travatan Z qhs os   Rhopressa qhs OS   Azopt BID OS    +HAG            Last edited by Carmelo Banegas MA on 1/10/2023  1:22 PM.         Assessment /Plan :  1. Primary open angle glaucoma (POAG) of both eyes, mild stage   Doing well, intraocular pressure (IOP) OD within acceptable range relative to target IOP and no evidence of progression. Continue current treatment. Reviewed importance of continued compliance with treatment and follow up.    Intraocular pressure (IOP) OS not within acceptable range relative to target IOP with risk of irreversible visual loss. Better IOP control is recommended. Treatment options may include change or additional medications, Selective Laser Trabeculoplasty (SLT laser), and/or incisional glaucoma surgery. Reviewed importance of continued compliance with treatment and follow  up.     Patient chooses defer and recheck IOP next visit    Patient instructed to continue using the following glaucoma medication as follows:  Rhopressa one drop in the left eye nightly, Azopt one drop in the left eye every 12 hours, and Travatan Z one drop in the left eye every night    Return to clinic in 3 months  or as needed.  With IOP Check, GOCT, and HVF 24-2     2. Keratitis sicca, bilateral  -- Condition stable, no therapeutic change required. Monitoring routinely.     3. Pseudophakia  -- Condition stable, no therapeutic change required. Monitoring routinely.     4.     Refractive Error - Rx for readers             Oriented - self; Oriented - place; Oriented - time

## 2023-01-10 NOTE — ASSESSMENT & PLAN NOTE
Cardiology recommends ER now for possible STACEY/cardioversion versus amiodarone.  Report phoned to [facility].  Pt wants to go by private vehicle.    Patient:   Liseth Tidwell            MRN: [id number]            FIN: [id number]              Age:   67 years     Sex:  FEMALE     :  [birth year]   Associated Diagnoses:   None   Author:   Jayden Mayfield     Subjective   Additional information patient seen, consult dictated.

## 2023-01-10 NOTE — PROGRESS NOTES
Shirley Metz  01/10/2023  [id number]    Heather Miller NP  Patient Care Team:  Heather Miller NP as PCP - General (Family Medicine)  Wilbert Ware MD as Consulting Physician (Ophthalmology)  Rajinder Quintanilla MD as Consulting Physician (Ophthalmology)  Suzan Gregorio PA-C as Physician Assistant (Rheumatology)  Rosario Valadez MD as Consulting Physician (Dermatology)  Trevon Ramirez MD as Consulting Physician (Cardiology)  Amish Mendoza MD (Pulmonary Disease)  Jaquan hCoi MD as Consulting Physician (Vascular Surgery)  Debbie Choi MD (Inactive) as Consulting Physician (Gynecology)  Emmanuel Fish MD as Consulting Physician (Hand Surgery)  PAXTON Chaidez Jr., MD as Consulting Physician (Orthopedic Surgery)  Phylicia Deleon MD as Physician (Internal Medicine)      [facility] Primary Care Note      Chief Complaint:  Chief Complaint   Patient presents with    Follow-up       History of Present Illness:  HPI    Dyspnea, HR 90s-low 100s. Onset after most recent office visit.   Dyspnea is at rest and with activity. Mild at rest. Possibly worse with activity. Uses two pillows to sleep.     Seen 1/5/23 for generalized weakness with bradycardia. HR 40 at home. Metoprolol dose decreased then. BLE pain has returned. Entirety of both legs with ambulation. No swelling. She has decreased dose of furosemide to 0.5 tablets daily and missed dose 2 days ago. Doesn't like to urinate so much so she cut back. Weight unchanged since Jan 5th.    Took metoprolol 25 mg this AM. HR 89. Still feels dyspneic.          ROS      The following were reviewed: Active problem list, medication list, allergies, family history, social history, and Health Maintenance.     History:  Past Medical History:   Diagnosis Date    Anemia 11/29/2018    Anxiety 11/28/2017    Arthritis     Atrial fibrillation with RVR 10/9/2019    Back pain     Basal cell carcinoma 03/29/2018    left mid back    Chronic diastolic  congestive heart failure 10/15/2019    CKD (chronic kidney disease) stage 3, GFR 30-59 ml/min 8/8/2016    Colon polyps     reported per patient.     Coronary artery calcification 10/5/2021    Fuchs' corneal dystrophy     General anesthetics causing adverse effect in therapeutic use     woke up during surgery and gagged on ET tube    Glaucoma     Glaucoma     Heart failure with preserved left ventricular function (HFpEF) 7/24/2018    Hyperlipidemia     Hypertension     Macular degeneration dry    Obesity     PVD (peripheral vascular disease) 4/26/2021    PVD (peripheral vascular disease) 4/26/2021    Squamous cell carcinoma 01/08/2019    left shoulder    Stenosis of carotid artery 10/5/2021    Trouble in sleeping     Type 2 diabetes mellitus without complication, without long-term current use of insulin 2/24/2021    Urinary tract infection      Past Surgical History:   Procedure Laterality Date    ABLATION OF ARRHYTHMOGENIC FOCUS FOR ATRIAL FIBRILLATION N/A 11/3/2022    Procedure: Ablation atrial fibrillation;  Surgeon: Toi Kelly MD;  Location: [facility] LAB;  Service: Cardiology;  Laterality: N/A;  afib, PVI/CTI, RFA, STACEY (cx if SR), DEDE, anes, MB, 3 Prep    ADENOIDECTOMY  1938    BREAST BIOPSY Left     1997. benign    BREAST CYST EXCISION Left 1997 approx    CARPAL TUNNEL RELEASE Right 2012 approx    CATARACT EXTRACTION Bilateral 1994    CHOLECYSTECTOMY  1975    CORNEAL TRANSPLANT Bilateral 08/2015 8/2015 - left; Right 4/2016    EYE SURGERY      Fuch's Corneal dystrophy - had 2nd operation with Dr. Quintanilla    EYE SURGERY      Cataract wIOL    left thumb Left 2011    removed cuboid for arthritis    REVERSE TOTAL SHOULDER ARTHROPLASTY Left 8/21/2018    Procedure: ARTHROPLASTY, SHOULDER, TOTAL, REVERSE;  Surgeon: Solomon Lujan MD;  Location: [facility];  Service: Orthopedics;  Laterality: Left;  WITH BICEP TENODESIS    SKIN CANCER EXCISION Left 2017    BCC removal by Dr. Valadez    SLT- OS (aka  TRABECULOPLASTY) Left 05/11/2011    repeat 3/14/12    TENOTOMY Left 8/21/2018    Procedure: TENOTOMY;  Surgeon: Solomon Lujan MD;  Location: [facility] OR;  Service: Orthopedics;  Laterality: Left;    TONSILLECTOMY  1938    TREATMENT OF CARDIAC ARRHYTHMIA N/A 10/10/2019    Procedure: CARDIOVERSION;  Surgeon: Pete Durán MD;  Location: [facility] CATH LAB;  Service: Cardiology;  Laterality: N/A;    TREATMENT OF CARDIAC ARRHYTHMIA N/A 12/6/2019    Procedure: CARDIOVERSION;  Surgeon: Samy Castillo MD;  Location: [facility] CATH LAB;  Service: Cardiology;  Laterality: N/A;     Family History   Problem Relation Age of Onset    Heart disease Mother     Hypertension Mother     Cancer Father 88        sarcoma( ?Kaposi's ) on leg    Deep vein thrombosis Neg Hx     Ovarian cancer Neg Hx     Breast cancer Neg Hx     Kidney disease Neg Hx     Strabismus Neg Hx     Retinal detachment Neg Hx     Macular degeneration Neg Hx     Glaucoma Neg Hx     Blindness Neg Hx     Amblyopia Neg Hx     Eczema Neg Hx     Lupus Neg Hx     Melanoma Neg Hx     Psoriasis Neg Hx     Diabetes Neg Hx     Stroke Neg Hx     Mental retardation Neg Hx     Mental illness Neg Hx     Hyperlipidemia Neg Hx     COPD Neg Hx     Asthma Neg Hx     Depression Neg Hx     Alcohol abuse Neg Hx     Drug abuse Neg Hx      Social History     Socioeconomic History    Marital status:     Number of children: 3   Tobacco Use    Smoking status: Never    Smokeless tobacco: Never    Tobacco comments:     history of passive smoking from her ex-   Substance and Sexual Activity    Alcohol use: Yes     Alcohol/week: 2.0 standard drinks     Types: 2 Standard drinks or equivalent per week     Comment: occ    Drug use: No    Sexual activity: Not Currently     Partners: Male     Birth control/protection: Post-menopausal     Comment: discussed protection for STD's   Other Topics Concern    Are you pregnant or think you may be? No    Breast-feeding No   Social History Narrative      x 2,  x 1. Retired artist - previously doing jewelry/wall pieces; ; lives in [facility]; has 3 sons - 52( lives in [place], 54, and 56;exercises minimally - limited due to back issues. Still drives. Does have a Living Will.     Social Determinants of Health     Physical Activity: Unknown    Days of Exercise per Week: Patient refused    Minutes of Exercise per Session: 10 min   Stress: No Stress Concern Present    Feeling of Stress : Not at all     Patient Active Problem List   Diagnosis    HTN (hypertension)    Mixed hyperlipidemia    Primary osteoarthritis involving multiple joints    Macular degeneration - Both Eyes    Fuch's endothelial dystrophy - Both Eyes    Lens replaced by other means    COAG (chronic open-angle glaucoma) - Both Eyes    Blepharitis, unspecified - Both Eyes    Shortness of breath    Abnormal ECG    PVC's (premature ventricular contractions)    Other microscopic hematuria    LVH (left ventricular hypertrophy) due to hypertensive disease    Osteopenia of hip    Myalgia    Calcification of aorta    Neuropathy    Unequal blood pressures in paired extremities    Medication side effects    History of cornea transplant    Pseudophakia    Insomnia    Anxiety    Pre-diabetes    Obesity (BMI 30.0-34.9)    Elevated troponin    Bilateral shoulder region arthritis    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    Anemia    Thrombus of left atrial appendage    PAF (paroxysmal atrial fibrillation)    Elevated liver enzymes    Atrial flutter with rapid ventricular response    Chronic anticoagulation    Keratitis sicca, bilateral    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    PAD (peripheral artery disease)    Statin intolerance    Pulmonary nodules/lesions, multiple    Spinal stenosis at L4-L5 level    Pulmonary granuloma    Lumbar spondylosis    Bilateral recurrent inguinal hernia without obstruction or gangrene    LLQ  abdominal pain    Pain in left leg    Rectal abnormality    Stenosis of carotid artery    Carotid artery calcification, bilateral    B12 deficiency    Seasonal allergic rhinitis    Atrial fibrillation with RVR    Elevated serum creatinine    Coagulopathy    Atheroma of artery     Review of patient's allergies indicates:   Allergen Reactions    Carvedilol Swelling     lips  lips  Other reaction(s): swelling    Cephalexin Other (See Comments)     Muscle/joint weakness unable to move     Doxycycline calcium Other (See Comments)     Weakness nausea   sob  Other reaction(s): weakness, nausea, SOB    Erythromycin Other (See Comments)     Complete weakness/could not walk    Gadolinium-containing contrast media Other (See Comments)     angioedema  Other reaction(s): Angioedema    Hydrocodone-acetaminophen      wheezing  wheezing  wheezing  Other reaction(s): wheezing    Inveltys [loteprednol etabonate] Palpitations and Other (See Comments)     Patient stated bp went up.    Labetalol Shortness Of Breath, Nausea Only and Other (See Comments)     Palpitations, LE weakness  Other reaction(s): SOB, palpitations, weakness    Loratadine Other (See Comments)     Double vision/spots  Other reaction(s): double vision    Macrolide antibiotics Other (See Comments)     Complete weakness/could not walk  Complete weakness/could not walk  Complete weakness/could not walk  Complete weakness/could not walk  Other reaction(s): complete weakness    Moxifloxacin Other (See Comments)     Hives   muscle soreness  Other reaction(s): hives, muscle soreness    Naproxen      wheezing  wheezing  wheezing  Other reaction(s): wheezing    Statins-hmg-coa reductase inhibitors Other (See Comments)     Severe Muscle weakness  Muscle weakness  Severe Muscle weakness  Other reaction(s): severe muscle weakness    Timolol Other (See Comments)     Blurred vision, Burning eyes, Severe muscle weakness, eye problems.  Other reaction(s): severe muscle weakness     Verapamil Diarrhea     Severe diarrhea.  Other reaction(s): diarrhea    Amlodipine      Myalgias    Other reaction(s): myalgian    Doxycycline     Doxycycline hyclate      Other reaction(s): weakness, nausea, SOB    Fenofibrate      Causes muscle weakness  Other reaction(s): muscle weakness    Hydralazine      Other reaction(s): muscle tremors    Hydralazine analogues      Muscle tremors/aches      Hydrocortisone     Isosorbide      Tolerates Imdur    Loteprednol      Other reaction(s): increased BP    Tetanus and diphtheria toxoids     Adhesive Rash     Clonidine patch - 2 mfg - contact dermatitis    Codeine Diarrhea and Other (See Comments)     Loss of balance   Other reaction(s): loss of balance    Doxazosin Palpitations     Other reaction(s): palpitations    Fexofenadine Other (See Comments)     Double vision/spots   Other reaction(s): double vision    Hydrocortisone (bulk) Other (See Comments)     Only suppository/ caused muscle weakness    Latanoprost Other (See Comments)     Muscle weakness  Other reaction(s): muscle weakness    Olopatadine Other (See Comments)     Muscle weakness  Other reaction(s): muscle weakness    Prednisone Anxiety       Medications:  Current Outpatient Medications on File Prior to Visit   Medication Sig Dispense Refill    acetaminophen (TYLENOL) 500 MG tablet Take 500 mg by mouth daily as needed. Taking 1 to 3      ALPRAZolam (XANAX) 0.5 MG tablet Take 1 tablet (0.5 mg total) by mouth nightly as needed for Anxiety. 30 tablet 5    apixaban (ELIQUIS) 2.5 mg Tab Take 1 tablet (2.5 mg total) by mouth 2 (two) times daily. 180 tablet 3    b complex vitamins capsule Take 1 capsule by mouth once daily.      brinzolamide (AZOPT) 1 % ophthalmic suspension Place 1 drop into the left eye 2 (two) times a day. 5 mL 12    cholecalciferol, vitamin D3, (VITAMIN D3) 50 mcg (2,000 unit) Cap Take 1 capsule by mouth once daily.      coenzyme Q10 200 mg capsule Take 400 mg by mouth once daily.       fish  oil-omega-3 fatty acids 300-1,000 mg capsule Take 2 g by mouth 2 (two) times daily.       furosemide (LASIX) 40 MG tablet Take 1 tablet (40 mg total) by mouth once daily. Alternate - 40mg twice a day and 40mg daily 180 tablet 1    losartan (COZAAR) 25 MG tablet Take 1 tablet (25 mg total) by mouth once daily. 180 tablet 1    metoprolol tartrate (LOPRESSOR) 25 MG tablet Take 0.5 tablets (12.5 mg total) by mouth 2 (two) times daily. 30 tablet 11    POLYETHYLENE GLYCOL 3350 (MIRALAX ORAL) Take 17 g by mouth 2 (two) times a day. Taking BID      RHOPRESSA 0.02 % ophthalmic solution PLACE 1 DROP INTO THE LEFT EYE EVERY EVENING. 2.5 mL 11    TRAVATAN Z 0.004 % ophthalmic solution Place 1 drop into the left eye every evening. 2.5 mL 12    [DISCONTINUED] cloNIDine (CATAPRES) 0.1 MG tablet Take 1 tablet (0.1 mg total) by mouth 2 (two) times daily as needed (systolic BP over 180). 20 tablet 1    [DISCONTINUED] isosorbide mononitrate (IMDUR) 30 MG 24 hr tablet Take 1 tablet (30 mg total) by mouth every evening. 90 tablet 1     Current Facility-Administered Medications on File Prior to Visit   Medication Dose Route Frequency Provider Last Rate Last Admin    sodium chloride 0.9% flush 3 mL  3 mL Intravenous PRN Steve Galarza PA-C        vancomycin in dextrose 5 % 1 gram/250 mL IVPB 1,000 mg  1,000 mg Intravenous On Call Procedure Vianney Fountain NP           Medications have been reviewed and reconciled with patient at visit today.    Barriers to medications present (yes )    Fall since last office visit (no )      Exam:  Vitals:    01/10/23 1521   BP: 125/76   Pulse: 89   Temp: 97.6 °F (36.4 °C)     Weight: 85 kg (187 lb 6.4 oz)   Body mass index is 32.17 kg/m².      BP Readings from Last 3 Encounters:   01/10/23 125/76   01/05/23 139/60   12/12/22 122/76     Wt Readings from Last 3 Encounters:   01/10/23 1521 85 kg (187 lb 6.4 oz)   01/05/23 0958 85.2 kg (187 lb 12.8 oz)   12/12/22 1328 83.3 kg (183 lb 9.6 oz)             Physical Exam  Constitutional:       General: She is not in acute distress.  HENT:      Mouth/Throat:      Mouth: Mucous membranes are moist.   Eyes:      General: No scleral icterus.  Cardiovascular:      Rate and Rhythm: Normal rate. Rhythm irregular.   Pulmonary:      Effort: Pulmonary effort is normal. No respiratory distress.      Breath sounds: Rales (few rales right base) present. No wheezing or rhonchi.   Abdominal:      General: Bowel sounds are normal. There is no distension.      Palpations: Abdomen is soft.      Tenderness: There is no abdominal tenderness.   Musculoskeletal:         General: No swelling.      Cervical back: Neck supple.   Lymphadenopathy:      Cervical: No cervical adenopathy.   Skin:     General: Skin is warm and dry.   Neurological:      Mental Status: She is alert. Mental status is at baseline.   Psychiatric:         Mood and Affect: Mood normal.         Behavior: Behavior normal.       Laboratory Reviewed: (Yes)  Lab Results   Component Value Date    WBC 6.20 12/07/2022    HGB 12.8 12/07/2022    HCT 38.9 12/07/2022     12/07/2022    CHOL 168 08/31/2022    TRIG 98 08/31/2022    HDL 43 08/31/2022    ALT 10 12/07/2022    AST 17 12/07/2022     12/07/2022    K 3.7 12/07/2022     12/07/2022    CREATININE 1.7 (H) 12/07/2022    BUN 29 (H) 12/07/2022    CO2 25 12/07/2022    TSH 3.826 08/31/2022    INR 1.1 10/25/2022    HGBA1C 6.2 (H) 08/31/2022           Health Maintenance  Health Maintenance Topics with due status: Not Due       Topic Last Completion Date    Pneumococcal Vaccines (Age 65+) 03/16/2022    Hemoglobin A1c (Prediabetes) 08/31/2022    Lipid Panel 08/31/2022     Health Maintenance Due   Topic Date Due    Shingles Vaccine (1 of 2) Never done    TETANUS VACCINE  11/09/2021    COVID-19 Vaccine (4 - Booster for Pfizer series) 11/24/2021       Assessment:  Problem List Items Addressed This Visit          Cardiac/Vascular    PAF (paroxysmal atrial  fibrillation) - Primary (Chronic)    Relevant Orders    IN OFFICE EKG 12-LEAD (to Muse)    Atrial fibrillation with RVR     Cardiology recommends ER now for possible STACEY/cardioversion versus amiodarone.  Report phoned to [facility].  Pt wants to go by private vehicle.          Relevant Orders    Refer to Emergency Dept.     Other Visit Diagnoses       Dyspnea, unspecified type        Relevant Orders    Refer to Emergency Dept.          Report phoned to [facility] at [facility].    Plan:  PAF (paroxysmal atrial fibrillation)  -     IN OFFICE EKG 12-LEAD (to [facility])    Atrial fibrillation with RVR  -     Cancel: B-TYPE NATRIURETIC PEPTIDE; Future; Expected date: 01/10/2023  -     Cancel: Comprehensive Metabolic Panel; Future; Expected date: 01/10/2023  -     Refer to Emergency Dept.    Dyspnea, unspecified type  -     Cancel: CBC Auto Differential; Future; Expected date: 01/10/2023  -     Refer to Emergency Dept.      -Patient's lab results were reviewed and discussed with patient  -Treatment options and alternatives were discussed with the patient. Patient expressed understanding. Patient was given the opportunity to ask questions and be an active participant in their medical care. Patient had no further questions or concerns at this time.   -Documentation of patient's health and condition was obtained from family member who was present during visit.  -Patient is an overall moderate risk for health complications from their medical conditions.       Follow up: Follow up after ER/hospitalization.      After visit summary printed and given to patient upon discharge.  Patient goals and care plan are included in After visit summary.    Total medical decision making time was 75 min.  The following issues were discussed: The primary encounter diagnosis was PAF (paroxysmal atrial fibrillation). Diagnoses of Atrial fibrillation with RVR and Dyspnea, unspecified type were also pertinent to this visit.    Health maintenance needs,  recent test results and goals of care discussed with pt and questions answered.

## 2023-01-11 ENCOUNTER — HOSPITAL ENCOUNTER (OUTPATIENT)
Facility: HOSPITAL | Age: 88
Discharge: HOME | End: 2023-01-12
Attending: EMERGENCY MEDICINE | Admitting: INTERNAL MEDICINE
Payer: MEDICARE

## 2023-01-11 DIAGNOSIS — R06.02 SHORTNESS OF BREATH: ICD-10-CM

## 2023-01-11 DIAGNOSIS — I50.9 ACUTE CONGESTIVE HEART FAILURE, UNSPECIFIED HEART FAILURE TYPE: Primary | ICD-10-CM

## 2023-01-11 DIAGNOSIS — I50.33 ACUTE ON CHRONIC DIASTOLIC CONGESTIVE HEART FAILURE: ICD-10-CM

## 2023-01-11 DIAGNOSIS — I48.0 PAROXYSMAL ATRIAL FIBRILLATION: ICD-10-CM

## 2023-01-11 DIAGNOSIS — R79.89 TROPONIN LEVEL ELEVATED: ICD-10-CM

## 2023-01-11 DIAGNOSIS — I48.92 ATRIAL FLUTTER WITH RAPID VENTRICULAR RESPONSE: ICD-10-CM

## 2023-01-11 DIAGNOSIS — I48.0 PAF (PAROXYSMAL ATRIAL FIBRILLATION): Chronic | ICD-10-CM

## 2023-01-11 LAB — TROPONIN I SERPL DL<=0.01 NG/ML-MCNC: 0.19 NG/ML (ref 0–0.03)

## 2023-01-11 PROCEDURE — 25000003 PHARM REV CODE 250: Mod: HCNC | Performed by: INTERNAL MEDICINE

## 2023-01-11 PROCEDURE — 25000003 PHARM REV CODE 250: Mod: HCNC | Performed by: NURSE PRACTITIONER

## 2023-01-11 PROCEDURE — G0378 HOSPITAL OBSERVATION PER HR: HCPCS | Mod: HCNC

## 2023-01-11 PROCEDURE — 63600175 PHARM REV CODE 636 W HCPCS: Mod: HCNC | Performed by: EMERGENCY MEDICINE

## 2023-01-11 PROCEDURE — 84484 ASSAY OF TROPONIN QUANT: CPT | Mod: HCNC | Performed by: INTERNAL MEDICINE

## 2023-01-11 PROCEDURE — 63600175 PHARM REV CODE 636 W HCPCS: Mod: HCNC | Performed by: INTERNAL MEDICINE

## 2023-01-11 PROCEDURE — 99214 PR OFFICE/OUTPT VISIT, EST, LEVL IV, 30-39 MIN: ICD-10-PCS | Mod: HCNC,ICN,, | Performed by: INTERNAL MEDICINE

## 2023-01-11 PROCEDURE — 99214 OFFICE O/P EST MOD 30 MIN: CPT | Mod: HCNC,ICN,, | Performed by: INTERNAL MEDICINE

## 2023-01-11 PROCEDURE — 25000003 PHARM REV CODE 250: Mod: HCNC | Performed by: EMERGENCY MEDICINE

## 2023-01-11 RX ORDER — METOPROLOL TARTRATE 25 MG/1
12.5 TABLET ORAL 2 TIMES DAILY
Status: DISCONTINUED | OUTPATIENT
Start: 2023-01-11 | End: 2023-01-11

## 2023-01-11 RX ORDER — MAG HYDROX/ALUMINUM HYD/SIMETH 200-200-20
30 SUSPENSION, ORAL (FINAL DOSE FORM) ORAL EVERY 6 HOURS PRN
Status: DISCONTINUED | OUTPATIENT
Start: 2023-01-11 | End: 2023-01-12 | Stop reason: HOSPADM

## 2023-01-11 RX ORDER — GUAIFENESIN 100 MG/5ML
200 SOLUTION ORAL EVERY 4 HOURS PRN
Status: DISCONTINUED | OUTPATIENT
Start: 2023-01-11 | End: 2023-01-12 | Stop reason: HOSPADM

## 2023-01-11 RX ORDER — IPRATROPIUM BROMIDE AND ALBUTEROL SULFATE 2.5; .5 MG/3ML; MG/3ML
3 SOLUTION RESPIRATORY (INHALATION) EVERY 4 HOURS PRN
Status: DISCONTINUED | OUTPATIENT
Start: 2023-01-11 | End: 2023-01-12 | Stop reason: HOSPADM

## 2023-01-11 RX ORDER — ALPRAZOLAM 0.5 MG/1
0.5 TABLET ORAL NIGHTLY PRN
Status: DISCONTINUED | OUTPATIENT
Start: 2023-01-11 | End: 2023-01-12 | Stop reason: HOSPADM

## 2023-01-11 RX ORDER — ASPIRIN 325 MG
325 TABLET ORAL
Status: COMPLETED | OUTPATIENT
Start: 2023-01-11 | End: 2023-01-11

## 2023-01-11 RX ORDER — FUROSEMIDE 10 MG/ML
40 INJECTION INTRAMUSCULAR; INTRAVENOUS DAILY
Status: DISCONTINUED | OUTPATIENT
Start: 2023-01-11 | End: 2023-01-12 | Stop reason: HOSPADM

## 2023-01-11 RX ORDER — METOPROLOL TARTRATE 25 MG/1
25 TABLET, FILM COATED ORAL 2 TIMES DAILY
Status: DISCONTINUED | OUTPATIENT
Start: 2023-01-11 | End: 2023-01-12 | Stop reason: HOSPADM

## 2023-01-11 RX ORDER — FUROSEMIDE 10 MG/ML
60 INJECTION INTRAMUSCULAR; INTRAVENOUS
Status: COMPLETED | OUTPATIENT
Start: 2023-01-11 | End: 2023-01-11

## 2023-01-11 RX ORDER — ACETAMINOPHEN 325 MG/1
650 TABLET ORAL EVERY 6 HOURS PRN
Status: DISCONTINUED | OUTPATIENT
Start: 2023-01-11 | End: 2023-01-12 | Stop reason: HOSPADM

## 2023-01-11 RX ORDER — ONDANSETRON 2 MG/ML
4 INJECTION INTRAMUSCULAR; INTRAVENOUS EVERY 8 HOURS PRN
Status: DISCONTINUED | OUTPATIENT
Start: 2023-01-11 | End: 2023-01-12 | Stop reason: HOSPADM

## 2023-01-11 RX ORDER — FUROSEMIDE 10 MG/ML
40 INJECTION INTRAMUSCULAR; INTRAVENOUS DAILY
Status: DISCONTINUED | OUTPATIENT
Start: 2023-01-11 | End: 2023-01-11

## 2023-01-11 RX ADMIN — ASPIRIN 325 MG ORAL TABLET 325 MG: 325 PILL ORAL at 01:01

## 2023-01-11 RX ADMIN — NITROGLYCERIN 1 INCH: 20 OINTMENT TOPICAL at 01:01

## 2023-01-11 RX ADMIN — METOPROLOL TARTRATE 25 MG: 25 TABLET, FILM COATED ORAL at 12:01

## 2023-01-11 RX ADMIN — FUROSEMIDE 40 MG: 10 INJECTION, SOLUTION INTRAMUSCULAR; INTRAVENOUS at 12:01

## 2023-01-11 RX ADMIN — FUROSEMIDE 60 MG: 10 INJECTION, SOLUTION INTRAMUSCULAR; INTRAVENOUS at 01:01

## 2023-01-11 RX ADMIN — METOPROLOL TARTRATE 25 MG: 25 TABLET, FILM COATED ORAL at 09:01

## 2023-01-11 RX ADMIN — APIXABAN 2.5 MG: 2.5 TABLET, FILM COATED ORAL at 09:01

## 2023-01-11 RX ADMIN — ALPRAZOLAM 0.5 MG: 0.5 TABLET ORAL at 09:01

## 2023-01-11 RX ADMIN — ACETAMINOPHEN 650 MG: 325 TABLET ORAL at 09:01

## 2023-01-11 RX ADMIN — METOPROLOL TARTRATE 12.5 MG: 25 TABLET, FILM COATED ORAL at 08:01

## 2023-01-11 NOTE — SUBJECTIVE & OBJECTIVE
Past Medical History:   Diagnosis Date    Anemia 11/29/2018    Anxiety 11/28/2017    Arthritis     Atrial fibrillation with RVR 10/9/2019    Back pain     Basal cell carcinoma 03/29/2018    left mid back    Chronic diastolic congestive heart failure 10/15/2019    CKD (chronic kidney disease) stage 3, GFR 30-59 ml/min 8/8/2016    Colon polyps     reported per patient.     Coronary artery calcification 10/5/2021    Fuchs' corneal dystrophy     General anesthetics causing adverse effect in therapeutic use     woke up during surgery and gagged on ET tube    Glaucoma     Glaucoma     Heart failure with preserved left ventricular function (HFpEF) 7/24/2018    Hyperlipidemia     Hypertension     Macular degeneration dry    Obesity     PVD (peripheral vascular disease) 4/26/2021    PVD (peripheral vascular disease) 4/26/2021    Squamous cell carcinoma 01/08/2019    left shoulder    Stenosis of carotid artery 10/5/2021    Trouble in sleeping     Type 2 diabetes mellitus without complication, without long-term current use of insulin 2/24/2021    Urinary tract infection        Past Surgical History:   Procedure Laterality Date    ABLATION OF ARRHYTHMOGENIC FOCUS FOR ATRIAL FIBRILLATION N/A 11/3/2022    Procedure: Ablation atrial fibrillation;  Surgeon: Toi Kelly MD;  Location: [facility] EP LAB;  Service: Cardiology;  Laterality: N/A;  afib, PVI/CTI, RFA, STACEY (cx if SR), DEDE, ABDIEL orantes, 3 Prep    ADENOIDECTOMY  1938    BREAST BIOPSY Left     1997. benign    BREAST CYST EXCISION Left 1997 approx    CARPAL TUNNEL RELEASE Right 2012 approx    CATARACT EXTRACTION Bilateral 1994    CHOLECYSTECTOMY  1975    CORNEAL TRANSPLANT Bilateral 08/2015 8/2015 - left; Right 4/2016    EYE SURGERY      Fuch's Corneal dystrophy - had 2nd operation with Dr. Quintanilla    EYE SURGERY      Cataract wIOL    left thumb Left 2011    removed cuboid for arthritis    REVERSE TOTAL SHOULDER ARTHROPLASTY Left 8/21/2018    Procedure: ARTHROPLASTY,  SHOULDER, TOTAL, REVERSE;  Surgeon: Solomon Lujan MD;  Location: [facility] OR;  Service: Orthopedics;  Laterality: Left;  WITH BICEP TENODESIS    SKIN CANCER EXCISION Left 2017    BCC removal by Dr. Rory MAHONEY- OS (aka TRABECULOPLASTY) Left 05/11/2011    repeat 3/14/12    TENOTOMY Left 8/21/2018    Procedure: TENOTOMY;  Surgeon: Solomon Lujan MD;  Location: [facility] OR;  Service: Orthopedics;  Laterality: Left;    TONSILLECTOMY  1938    TREATMENT OF CARDIAC ARRHYTHMIA N/A 10/10/2019    Procedure: CARDIOVERSION;  Surgeon: Pete Durán MD;  Location: [facility] CATH LAB;  Service: Cardiology;  Laterality: N/A;    TREATMENT OF CARDIAC ARRHYTHMIA N/A 12/6/2019    Procedure: CARDIOVERSION;  Surgeon: Samy Castillo MD;  Location: [facility] CATH LAB;  Service: Cardiology;  Laterality: N/A;       Review of patient's allergies indicates:   Allergen Reactions    Carvedilol Swelling     Lip swelling    Cephalexin Other (See Comments)     Muscle/joint weakness unable to move     Doxycycline Shortness Of Breath, Nausea And Vomiting and Other (See Comments)     weakness    Erythromycin Other (See Comments)     Complete weakness/could not walk    Gadolinium-containing contrast media Swelling     angioedema    Hydrocodone-acetaminophen Shortness Of Breath     wheezing    Inveltys [loteprednol etabonate] Palpitations and Other (See Comments)     Patient stated bp went up.    Labetalol Shortness Of Breath, Nausea Only, Palpitations and Other (See Comments)     weakness    Loratadine Other (See Comments)     Double vision/spots  Other reaction(s): double vision    Naproxen      wheezing  wheezing  wheezing  Other reaction(s): wheezing    Statins-hmg-coa reductase inhibitors Other (See Comments)     Severe Muscle weakness  Muscle weakness  Severe Muscle weakness  Other reaction(s): severe muscle weakness    Amlodipine Other (See Comments)     Myalgias    Fenofibrate Other (See Comments)     muscle weakness      Hydralazine Other (See Comments)      muscle tremors    Hydralazine analogues Other (See Comments)     Muscle tremors/aches      Loteprednol Other (See Comments)     increased BP    Macrolide antibiotics Other (See Comments)     Complete weakness/could not walk      Moxifloxacin Hives and Other (See Comments)     muscle soreness    Timolol Other (See Comments)     Blurred vision, Burning eyes, Severe muscle weakness, eye problems.      Verapamil Diarrhea     Severe diarrhea.      Hydrocortisone     Isosorbide      Tolerates Imdur    Tetanus and diphtheria toxoids     Adhesive Rash     Clonidine patch--contact dermatitis    Codeine Diarrhea and Other (See Comments)     Loss of balance       Doxazosin Palpitations    Fexofenadine Other (See Comments)     Double vision/spots       Hydrocortisone (bulk) Other (See Comments)     Only suppository/ caused muscle weakness    Latanoprost Other (See Comments)     Muscle weakness      Olopatadine Other (See Comments)     Muscle weakness      Prednisone Anxiety       Current Facility-Administered Medications on File Prior to Encounter   Medication    sodium chloride 0.9% flush 3 mL    vancomycin in dextrose 5 % 1 gram/250 mL IVPB 1,000 mg     Current Outpatient Medications on File Prior to Encounter   Medication Sig    acetaminophen (TYLENOL) 500 MG tablet Take 500 mg by mouth nightly as needed.    ALPRAZolam (XANAX) 0.5 MG tablet Take 1 tablet (0.5 mg total) by mouth nightly as needed for Anxiety.    apixaban (ELIQUIS) 2.5 mg Tab Take 1 tablet (2.5 mg total) by mouth 2 (two) times daily.    b complex vitamins capsule Take 1 capsule by mouth once daily.    brinzolamide (AZOPT) 1 % ophthalmic suspension Place 1 drop into the left eye 2 (two) times a day. (Patient taking differently: Place 1 drop into the left eye every morning.)    cholecalciferol, vitamin D3, (VITAMIN D3) 50 mcg (2,000 unit) Cap Take 1 capsule by mouth once daily.    coenzyme Q10 200 mg capsule Take 200 mg by mouth once daily.    fish  oil-omega-3 fatty acids 300-1,000 mg capsule Take 1 capsule by mouth once daily.    furosemide (LASIX) 40 MG tablet Take 1 tablet (40 mg total) by mouth once daily. Alternate - 40mg twice a day and 40mg daily    losartan (COZAAR) 25 MG tablet Take 1 tablet (25 mg total) by mouth once daily.    metoprolol tartrate (LOPRESSOR) 25 MG tablet Take 0.5 tablets (12.5 mg total) by mouth 2 (two) times daily.    POLYETHYLENE GLYCOL 3350 (MIRALAX ORAL) Take 17 g by mouth 2 (two) times a day. Taking BID    RHOPRESSA 0.02 % ophthalmic solution PLACE 1 DROP INTO THE LEFT EYE EVERY EVENING.    TRAVATAN Z 0.004 % ophthalmic solution Place 1 drop into the left eye every evening.    [DISCONTINUED] cloNIDine (CATAPRES) 0.1 MG tablet Take 1 tablet (0.1 mg total) by mouth 2 (two) times daily as needed (systolic BP over 180).    [DISCONTINUED] isosorbide mononitrate (IMDUR) 30 MG 24 hr tablet Take 1 tablet (30 mg total) by mouth every evening.     Family History       Problem Relation (Age of Onset)    Cancer Father (88)    Heart disease Mother    Hypertension Mother          Tobacco Use    Smoking status: Never    Smokeless tobacco: Never    Tobacco comments:     history of passive smoking from her ex-   Substance and Sexual Activity    Alcohol use: Yes     Alcohol/week: 2.0 standard drinks     Types: 2 Standard drinks or equivalent per week     Comment: occ    Drug use: No    Sexual activity: Not Currently     Partners: Male     Birth control/protection: Post-menopausal     Comment: discussed protection for STD's     Review of Systems   Constitutional: Positive for malaise/fatigue.   HENT: Negative.     Eyes: Negative.    Cardiovascular:  Positive for dyspnea on exertion and palpitations.   Respiratory:  Positive for shortness of breath.    Endocrine: Negative.    Hematologic/Lymphatic: Negative.    Skin: Negative.    Musculoskeletal: Negative.    Gastrointestinal: Negative.    Genitourinary: Negative.    Neurological:  Positive  for weakness.   Psychiatric/Behavioral: Negative.     Allergic/Immunologic: Negative.    Objective:     Vital Signs (Most Recent):  Temp: 97.6 °F (36.4 °C) (01/11/23 0900)  Pulse: 107 (01/11/23 0900)  Resp: (!) 31 (01/11/23 0900)  BP: (!) 151/86 (01/11/23 0844)  SpO2: 96 % (01/11/23 0900)   Vital Signs (24h Range):  Temp:  [97.6 °F (36.4 °C)-98.6 °F (37 °C)] 97.6 °F (36.4 °C)  Pulse:  [] 107  Resp:  [18-31] 31  SpO2:  [93 %-98 %] 96 %  BP: (112-151)/(62-92) 151/86     Weight: 85.5 kg (188 lb 7.9 oz)  Body mass index is 32.35 kg/m².    SpO2: 96 %       No intake or output data in the 24 hours ending 01/11/23 1148    Lines/Drains/Airways       Peripheral Intravenous Line  Duration                  Peripheral IV - Single Lumen 01/10/23 2211 22 G Posterior;Right Hand <1 day                    Physical Exam  Vitals and nursing note reviewed.   Constitutional:       General: She is not in acute distress.     Appearance: Normal appearance. She is well-developed. She is not ill-appearing or diaphoretic.   HENT:      Head: Normocephalic.   Neck:      Thyroid: No thyromegaly.      Vascular: Normal carotid pulses. No carotid bruit, hepatojugular reflux or JVD.   Cardiovascular:      Rate and Rhythm: Normal rate. Rhythm irregularly irregular.      Pulses: Normal pulses.           Radial pulses are 2+ on the right side and 2+ on the left side.      Heart sounds: Normal heart sounds, S1 normal and S2 normal. No murmur heard.    No friction rub. No gallop.   Pulmonary:      Effort: Pulmonary effort is normal.      Breath sounds: Examination of the right-lower field reveals decreased breath sounds. Examination of the left-lower field reveals decreased breath sounds. Decreased breath sounds present. No wheezing or rales.   Abdominal:      General: Bowel sounds are normal. There is no abdominal bruit.      Palpations: Abdomen is soft. There is no hepatomegaly, splenomegaly or mass.      Tenderness: There is no abdominal  tenderness.   Musculoskeletal:      Cervical back: Neck supple.   Lymphadenopathy:      Cervical: No cervical adenopathy.   Skin:     General: Skin is warm.   Neurological:      Mental Status: She is alert and oriented to person, place, and time.   Psychiatric:         Behavior: Behavior normal. Behavior is cooperative.     Significant Labs: ABG: No results for input(s): PH, PCO2, HCO3, POCSATURATED, BE in the last 48 hours., Blood Culture: No results for input(s): LABBLOO in the last 48 hours., BMP:   Recent Labs   Lab 01/10/23  2210         K 4.2      CO2 24   BUN 43*   CREATININE 2.0*   CALCIUM 9.8   , CMP   Recent Labs   Lab 01/10/23  2210      K 4.2      CO2 24      BUN 43*   CREATININE 2.0*   CALCIUM 9.8   PROT 7.5   ALBUMIN 3.7   BILITOT 0.4   ALKPHOS 76   AST 20   ALT 17   ANIONGAP 13   , CBC   Recent Labs   Lab 01/10/23  2210   WBC 6.42   HGB 11.4*   HCT 35.5*      , INR No results for input(s): INR, PROTIME in the last 48 hours., and Troponin   Recent Labs   Lab 01/10/23  2210 01/11/23  0355   TROPONINI 0.194* 0.188*       Significant Imaging: Echocardiogram: Transthoracic echo (TTE) complete (Cupid Only):   Results for orders placed or performed during the hospital encounter of 07/11/22   Echo   Result Value Ref Range    BSA 1.94 m2    TDI SEPTAL 0.07 m/s    LV LATERAL E/E' RATIO 10.00 m/s    LV SEPTAL E/E' RATIO 15.71 m/s    IVC diameter 1.35 cm    Left Ventricular Outflow Tract Mean Velocity 0.857597263 cm/s    Left Ventricular Outflow Tract Mean Gradient 1.64 mmHg    TDI LATERAL 0.11 m/s    LVIDd 3.31 (A) 3.5 - 6.0 cm    IVS 1.55 (A) 0.6 - 1.1 cm    Posterior Wall 1.38 (A) 0.6 - 1.1 cm    Ao root annulus 3.06 cm    LVIDs 2.42 2.1 - 4.0 cm    FS 27 28 - 44 %    Sinus 3.25 cm    STJ 3.12 cm    Ascending aorta 3.06 cm    LV mass 172.58 g    LA size 3.11 cm    TAPSE 2.13 cm    Left Ventricle Relative Wall Thickness 0.83 cm    AV mean gradient 6 mmHg    AV valve  area 2.07 cm2    AV Velocity Ratio 0.91     AV index (prosthetic) 0.61     MV valve area p 1/2 method 3.64 cm2    E/A ratio 3.24     Mean e' 0.09 m/s    E wave deceleration time 208.097409110673979 msec    IVRT 71.808283862566980 msec    LVOT diameter 2.08 cm    LVOT area 3.4 cm2    LVOT peak jus 1.20 m/s    LVOT peak VTI 15.90 cm    Ao peak jus 1.32 m/s    Ao VTI 26.1 cm    RVOT peak jus 0.90 m/s    RVOT peak VTI 5.6 cm    Mr max jus 4.52 m/s    LVOT stroke volume 54.00 cm3    AV peak gradient 7 mmHg    PV mean gradient 1.53 mmHg    E/E' ratio 12.22 m/s    MV Peak E Jus 1.10 m/s    TR Max Jus 2.74 m/s    MV stenosis pressure 1/2 time 60.349910607494879 ms    MV Peak A Jus 0.34 m/s    LV Systolic Volume 20.57 mL    LV Systolic Volume Index 10.9 mL/m2    LV Diastolic Volume 44.46 mL    LV Diastolic Volume Index 23.52 mL/m2    LV Mass Index 91 g/m2    RA Major Axis 3.77 cm    Left Atrium Minor Axis 4.11 cm    Left Atrium Major Axis 4.21 cm    Triscuspid Valve Regurgitation Peak Gradient 30 mmHg    RA Width 3.26 cm    EF 60 %    Narrative    · Concentric remodeling and normal systolic function.  · The estimated ejection fraction is 60%.  · Normal left ventricular diastolic function.  · Normal right ventricular size with normal right ventricular systolic   function.

## 2023-01-11 NOTE — PHARMACY MED REC
"Admission Medication History     The home medication history was taken by Mata Noonan.    You may go to "Admission" then "Reconcile Home Medications" tabs to review and/or act upon these items.     The home medication list has been updated by the Pharmacy department.   Please read ALL comments highlighted in yellow.   Please address this information as you see fit.    Feel free to contact us if you have any questions or require assistance.      Medications listed below were obtained from: Patient/family, [email or web address] software- [employer], and Patient's pharmacy  (Not in a hospital admission)      Potential issues to be addressed PRIOR TO DISCHARGE: NONE    Mata Noonan, CPKristin-Adv  Pharmacy Technician Specialist-Medication History  [facility] [phone]  Secure chat preferred     Current Outpatient Medications on File Prior to Encounter   Medication Sig Dispense Refill Last Dose    acetaminophen (TYLENOL) 500 MG tablet Take 500 mg by mouth nightly as needed.   1/10/2023    ALPRAZolam (XANAX) 0.5 MG tablet Take 1 tablet (0.5 mg total) by mouth nightly as needed for Anxiety. 30 tablet 5 1/10/2023    apixaban (ELIQUIS) 2.5 mg Tab Take 1 tablet (2.5 mg total) by mouth 2 (two) times daily. 180 tablet 3 1/10/2023    b complex vitamins capsule Take 1 capsule by mouth once daily.   1/10/2023    brinzolamide (AZOPT) 1 % ophthalmic suspension Place 1 drop into the left eye 2 (two) times a day. (Patient taking differently: Place 1 drop into the left eye every morning.) 5 mL 12 1/10/2023    cholecalciferol, vitamin D3, (VITAMIN D3) 50 mcg (2,000 unit) Cap Take 1 capsule by mouth once daily.   1/10/2023    coenzyme Q10 200 mg capsule Take 200 mg by mouth once daily.   1/10/2023    fish oil-omega-3 fatty acids 300-1,000 mg capsule Take 1 capsule by mouth once daily.   1/10/2023    furosemide (LASIX) 40 MG tablet Take 1 tablet (40 mg total) by mouth once daily. Alternate - 40mg twice a day and 40mg daily 180 tablet 1 1/10/2023    " losartan (COZAAR) 25 MG tablet Take 1 tablet (25 mg total) by mouth once daily. 180 tablet 1 1/10/2023    metoprolol tartrate (LOPRESSOR) 25 MG tablet Take 0.5 tablets (12.5 mg total) by mouth 2 (two) times daily. 30 tablet 11 1/10/2023    POLYETHYLENE GLYCOL 3350 (MIRALAX ORAL) Take 17 g by mouth 2 (two) times a day. Taking BID   1/10/2023    RHOPRESSA 0.02 % ophthalmic solution PLACE 1 DROP INTO THE LEFT EYE EVERY EVENING. 2.5 mL 11 1/10/2023    TRAVATAN Z 0.004 % ophthalmic solution Place 1 drop into the left eye every evening. 2.5 mL 12 1/10/2023                           .

## 2023-01-11 NOTE — HPI
Ms. Bettencourt is a elderly 89-year-old female with PMH significant for PAF on apixaban, CHF with preserved EF, carotid artery disease, hypertension, hyperlipidemia, CKD stage 3, has been complaining of progressively worsening SOB, dyspnea on exertion.  States that she has cut back on dose of diuretics due to increased urination.  She was evaluated by a PCP earlier today, was found to be in atrial fibrillation with RVR, hence sent to the ED for further evaluation.  HR ranging between 50s to 120s in the ED remains in atrial fibrillation.  Hemodynamically stable.  Creatinine 2.0, at baseline.  BNP 5 five eight, CXR reveals increased vascular congestion, bilateral pleural effusions.  Troponin elevated 0.194, denies chest pain.  Patient received Lasix 60 mg IV x1 along with aspirin 325 mg p.o. one time dose.      Admitting diagnosis:  Atrial fibrillation with RVR.

## 2023-01-11 NOTE — ASSESSMENT & PLAN NOTE
Acute on chronic diastolic CHF, likely precipitated by being back in a fib.  IV Lasix 40 mg daily.  Low salt diet.    Will need close f/u with Dr. Ramirez, primary cardiologist, asap after discharge.

## 2023-01-11 NOTE — ASSESSMENT & PLAN NOTE
PAF, s/p ablation Nov 2022.  Increase Metoprolol to 25 mg bid as tolerated.  Continue Eliquis.  Rate control strategy advised for now.  Will need to see Dr. Kelly, EP, for further mgt recs as outpt.

## 2023-01-11 NOTE — FIRST PROVIDER EVALUATION
Medical screening examination initiated.  I have conducted a focused provider triage encounter, findings are as follows:    Brief history of present illness:  Shortness of breath for a week.  Her doctor sent her for AFib and CHF    There were no vitals filed for this visit.    Pertinent physical exam:  NAD, alert        Preliminary workup initiated; this workup will be continued and followed by the physician or advanced practice provider that is assigned to the patient when roomed.

## 2023-01-11 NOTE — HPI
Cardiology consulted for a fib, CHF.  Pt has h/o PAF, diastolic CHF, CRI, HTN.  Sees Dr. Ramirez and Dr. Kelly, Cardiology.  Has had PAF issues last few years, h/o cardioversion and then ablation Nov 2022.  STACEY Nov 2022 normal LVEF.  Has had increased dyspnea x one week.  In ER noted to be in a fib, overall HR controlled but fluctuates.  Ecg showed a fib, rate 80's.  , up from last one.  Creatinine 2.0  Troponin 0.188 range, flat.  Per Dr. Ramirez's notes, pt sensitive to many meds.  Did not tolerate Amiodarone, Diltiazem in past.

## 2023-01-11 NOTE — H&P
[facility] - Emergency Dept.  [facility]  History & Physical    Patient Name: Shirley Metz  MRN: [id number]  Patient Class: OP- Observation  Admission Date: 1/11/2023  Attending Physician: Sebastián Irby MD   Primary Care Provider: Heather Miller NP         Patient information was obtained from patient, past medical records and ER records.     Subjective:     Principal Problem:<principal problem not specified>    Chief Complaint:   Chief Complaint   Patient presents with    Shortness of Breath     Pt sent from PCP for afib. Pt states SOB x 1 wk. Hx of afib. States having cardiac ablasion done 11/2022. States HR increasing to 120 at home.        HPI: Ms. Bettencourt is a elderly 89-year-old female with PMH significant for PAF on apixaban, CHF with preserved EF, carotid artery disease, hypertension, hyperlipidemia, CKD stage 3, has been complaining of progressively worsening SOB, dyspnea on exertion.  States that she has cut back on dose of diuretics due to increased urination.  She was evaluated by a PCP earlier today, was found to be in atrial fibrillation with RVR, hence sent to the ED for further evaluation.  HR ranging between 50s to 120s in the ED remains in atrial fibrillation.  Hemodynamically stable.  Creatinine 2.0, at baseline.  BNP 5 five eight, CXR reveals increased vascular congestion, bilateral pleural effusions.  Troponin elevated 0.194, denies chest pain.  Patient received Lasix 60 mg IV x1 along with aspirin 325 mg p.o. one time dose.      Admitting diagnosis:  Atrial fibrillation with RVR.      Past Medical History:   Diagnosis Date    Anemia 11/29/2018    Anxiety 11/28/2017    Arthritis     Atrial fibrillation with RVR 10/9/2019    Back pain     Basal cell carcinoma 03/29/2018    left mid back    Chronic diastolic congestive heart failure 10/15/2019    CKD (chronic kidney disease) stage 3, GFR 30-59 ml/min 8/8/2016    Colon polyps     reported per patient.     Coronary artery  calcification 10/5/2021    Fuchs' corneal dystrophy     General anesthetics causing adverse effect in therapeutic use     woke up during surgery and gagged on ET tube    Glaucoma     Glaucoma     Heart failure with preserved left ventricular function (HFpEF) 7/24/2018    Hyperlipidemia     Hypertension     Macular degeneration dry    Obesity     PVD (peripheral vascular disease) 4/26/2021    PVD (peripheral vascular disease) 4/26/2021    Squamous cell carcinoma 01/08/2019    left shoulder    Stenosis of carotid artery 10/5/2021    Trouble in sleeping     Type 2 diabetes mellitus without complication, without long-term current use of insulin 2/24/2021    Urinary tract infection        Past Surgical History:   Procedure Laterality Date    ABLATION OF ARRHYTHMOGENIC FOCUS FOR ATRIAL FIBRILLATION N/A 11/3/2022    Procedure: Ablation atrial fibrillation;  Surgeon: Toi Kelly MD;  Location: [facility];  Service: Cardiology;  Laterality: N/A;  afib, PVI/CTI, RFA, STACEY (cx if SR), DEDE, anes, MB, 3 Prep    ADENOIDECTOMY  1938    BREAST BIOPSY Left     1997. benign    BREAST CYST EXCISION Left 1997 approx    CARPAL TUNNEL RELEASE Right 2012 approx    CATARACT EXTRACTION Bilateral 1994    CHOLECYSTECTOMY  1975    CORNEAL TRANSPLANT Bilateral 08/2015 8/2015 - left; Right 4/2016    EYE SURGERY      Fuch's Corneal dystrophy - had 2nd operation with Dr. Quintanilla    EYE SURGERY      Cataract wIOL    left thumb Left 2011    removed cuboid for arthritis    REVERSE TOTAL SHOULDER ARTHROPLASTY Left 8/21/2018    Procedure: ARTHROPLASTY, SHOULDER, TOTAL, REVERSE;  Surgeon: Solomon Lujan MD;  Location: [facility];  Service: Orthopedics;  Laterality: Left;  WITH BICEP TENODESIS    SKIN CANCER EXCISION Left 2017    BCC removal by Dr. Valadez    SLT- OS (aka TRABECULOPLASTY) Left 05/11/2011    repeat 3/14/12    TENOTOMY Left 8/21/2018    Procedure: TENOTOMY;  Surgeon: Solomon Lujan MD;   Location: [facility] OR;  Service: Orthopedics;  Laterality: Left;    TONSILLECTOMY  1938    TREATMENT OF CARDIAC ARRHYTHMIA N/A 10/10/2019    Procedure: CARDIOVERSION;  Surgeon: Pete Durán MD;  Location: [facility] CATH LAB;  Service: Cardiology;  Laterality: N/A;    TREATMENT OF CARDIAC ARRHYTHMIA N/A 12/6/2019    Procedure: CARDIOVERSION;  Surgeon: Samy Castillo MD;  Location: [facility] CATH LAB;  Service: Cardiology;  Laterality: N/A;       Review of patient's allergies indicates:   Allergen Reactions    Carvedilol Swelling     lips  lips  Other reaction(s): swelling    Cephalexin Other (See Comments)     Muscle/joint weakness unable to move     Doxycycline calcium Other (See Comments)     Weakness nausea   sob  Other reaction(s): weakness, nausea, SOB    Erythromycin Other (See Comments)     Complete weakness/could not walk    Gadolinium-containing contrast media Other (See Comments)     angioedema  Other reaction(s): Angioedema    Hydrocodone-acetaminophen      wheezing  wheezing  wheezing  Other reaction(s): wheezing    Inveltys [loteprednol etabonate] Palpitations and Other (See Comments)     Patient stated bp went up.    Labetalol Shortness Of Breath, Nausea Only and Other (See Comments)     Palpitations, LE weakness  Other reaction(s): SOB, palpitations, weakness    Loratadine Other (See Comments)     Double vision/spots  Other reaction(s): double vision    Macrolide antibiotics Other (See Comments)     Complete weakness/could not walk  Complete weakness/could not walk  Complete weakness/could not walk  Complete weakness/could not walk  Other reaction(s): complete weakness    Moxifloxacin Other (See Comments)     Hives   muscle soreness  Other reaction(s): hives, muscle soreness    Naproxen      wheezing  wheezing  wheezing  Other reaction(s): wheezing    Statins-hmg-coa reductase inhibitors Other (See Comments)     Severe Muscle weakness  Muscle weakness  Severe Muscle weakness  Other reaction(s): severe  muscle weakness    Timolol Other (See Comments)     Blurred vision, Burning eyes, Severe muscle weakness, eye problems.  Other reaction(s): severe muscle weakness    Verapamil Diarrhea     Severe diarrhea.  Other reaction(s): diarrhea    Amlodipine      Myalgias    Other reaction(s): myalgian    Doxycycline     Doxycycline hyclate      Other reaction(s): weakness, nausea, SOB    Fenofibrate      Causes muscle weakness  Other reaction(s): muscle weakness    Hydralazine      Other reaction(s): muscle tremors    Hydralazine analogues      Muscle tremors/aches      Hydrocortisone     Isosorbide      Tolerates Imdur    Loteprednol      Other reaction(s): increased BP    Tetanus and diphtheria toxoids     Adhesive Rash     Clonidine patch - 2 mfg - contact dermatitis    Codeine Diarrhea and Other (See Comments)     Loss of balance   Other reaction(s): loss of balance    Doxazosin Palpitations     Other reaction(s): palpitations    Fexofenadine Other (See Comments)     Double vision/spots   Other reaction(s): double vision    Hydrocortisone (bulk) Other (See Comments)     Only suppository/ caused muscle weakness    Latanoprost Other (See Comments)     Muscle weakness  Other reaction(s): muscle weakness    Olopatadine Other (See Comments)     Muscle weakness  Other reaction(s): muscle weakness    Prednisone Anxiety       Current Facility-Administered Medications on File Prior to Encounter   Medication    sodium chloride 0.9% flush 3 mL    vancomycin in dextrose 5 % 1 gram/250 mL IVPB 1,000 mg     Current Outpatient Medications on File Prior to Encounter   Medication Sig    acetaminophen (TYLENOL) 500 MG tablet Take 500 mg by mouth daily as needed. Taking 1 to 3    ALPRAZolam (XANAX) 0.5 MG tablet Take 1 tablet (0.5 mg total) by mouth nightly as needed for Anxiety.    apixaban (ELIQUIS) 2.5 mg Tab Take 1 tablet (2.5 mg total) by mouth 2 (two) times daily.    b complex vitamins capsule Take 1 capsule  by mouth once daily.    brinzolamide (AZOPT) 1 % ophthalmic suspension Place 1 drop into the left eye 2 (two) times a day.    cholecalciferol, vitamin D3, (VITAMIN D3) 50 mcg (2,000 unit) Cap Take 1 capsule by mouth once daily.    coenzyme Q10 200 mg capsule Take 400 mg by mouth once daily.     fish oil-omega-3 fatty acids 300-1,000 mg capsule Take 2 g by mouth 2 (two) times daily.     furosemide (LASIX) 40 MG tablet Take 1 tablet (40 mg total) by mouth once daily. Alternate - 40mg twice a day and 40mg daily    losartan (COZAAR) 25 MG tablet Take 1 tablet (25 mg total) by mouth once daily.    metoprolol tartrate (LOPRESSOR) 25 MG tablet Take 0.5 tablets (12.5 mg total) by mouth 2 (two) times daily.    POLYETHYLENE GLYCOL 3350 (MIRALAX ORAL) Take 17 g by mouth 2 (two) times a day. Taking BID    RHOPRESSA 0.02 % ophthalmic solution PLACE 1 DROP INTO THE LEFT EYE EVERY EVENING.    TRAVATAN Z 0.004 % ophthalmic solution Place 1 drop into the left eye every evening.    [DISCONTINUED] cloNIDine (CATAPRES) 0.1 MG tablet Take 1 tablet (0.1 mg total) by mouth 2 (two) times daily as needed (systolic BP over 180).    [DISCONTINUED] isosorbide mononitrate (IMDUR) 30 MG 24 hr tablet Take 1 tablet (30 mg total) by mouth every evening.     Family History       Problem Relation (Age of Onset)    Cancer Father (88)    Heart disease Mother    Hypertension Mother          Tobacco Use    Smoking status: Never    Smokeless tobacco: Never    Tobacco comments:     history of passive smoking from her ex-   Substance and Sexual Activity    Alcohol use: Yes     Alcohol/week: 2.0 standard drinks     Types: 2 Standard drinks or equivalent per week     Comment: occ    Drug use: No    Sexual activity: Not Currently     Partners: Male     Birth control/protection: Post-menopausal     Comment: discussed protection for STD's     Review of Systems   Constitutional: Negative.  Negative for chills and fever.   HENT: Negative.   Negative for congestion, rhinorrhea, sore throat and trouble swallowing.    Eyes: Negative.  Negative for visual disturbance.   Respiratory:  Positive for shortness of breath. Negative for cough and wheezing.    Cardiovascular:  Positive for leg swelling. Negative for chest pain and palpitations.   Gastrointestinal: Negative.  Negative for abdominal pain, diarrhea, nausea and vomiting.   Endocrine: Negative.    Genitourinary: Negative.  Negative for dysuria and flank pain.   Musculoskeletal: Negative.  Negative for back pain.   Skin: Negative.  Negative for rash.   Allergic/Immunologic: Negative.    Neurological: Negative.  Negative for speech difficulty, weakness, numbness and headaches.   Hematological: Negative.    Psychiatric/Behavioral: Negative.  Negative for hallucinations. The patient is not nervous/anxious.    All other systems reviewed and are negative.  Objective:     Vital Signs (Most Recent):  Temp: 98.6 °F (37 °C) (01/10/23 2018)  Pulse: 104 (01/10/23 2018)  Resp: 20 (01/10/23 2018)  BP: 127/68 (01/10/23 2018)  SpO2: 98 % (01/10/23 2018) Vital Signs (24h Range):  Temp:  [97.6 °F (36.4 °C)-98.6 °F (37 °C)] 98.6 °F (37 °C)  Pulse:  [] 104  Resp:  [20] 20  SpO2:  [97 %-98 %] 98 %  BP: (125-127)/(68-76) 127/68     Weight: 85.5 kg (188 lb 7.9 oz)  Body mass index is 32.35 kg/m².    Physical Exam  Vitals and nursing note reviewed.   Constitutional:       General: She is awake. She is not in acute distress.     Appearance: She is obese. She is not ill-appearing.   HENT:      Head: Normocephalic and atraumatic.      Mouth/Throat:      Mouth: Mucous membranes are moist.   Eyes:      General: No scleral icterus.     Conjunctiva/sclera: Conjunctivae normal.   Cardiovascular:      Rate and Rhythm: Tachycardia present. Rhythm irregular.      Heart sounds: No murmur heard.  Pulmonary:      Effort: Pulmonary effort is normal. No respiratory distress.      Breath sounds: Rales present. No wheezing.   Abdominal:       Palpations: Abdomen is soft.      Tenderness: There is no abdominal tenderness.   Musculoskeletal:         General: Swelling present. Normal range of motion.      Cervical back: Normal range of motion and neck supple.   Skin:     General: Skin is warm.      Coloration: Skin is not jaundiced.   Neurological:      General: No focal deficit present.      Mental Status: She is alert and oriented to person, place, and time. Mental status is at baseline.   Psychiatric:         Attention and Perception: Attention normal.         Speech: Speech normal.         Behavior: Behavior is cooperative.           Significant Labs: All pertinent labs within the past 24 hours have been reviewed.  BMP:   Recent Labs   Lab 01/10/23  2210         K 4.2      CO2 24   BUN 43*   CREATININE 2.0*   CALCIUM 9.8     CBC:   Recent Labs   Lab 01/10/23  2210   WBC 6.42   HGB 11.4*   HCT 35.5*        CMP:   Recent Labs   Lab 01/10/23  2210      K 4.2      CO2 24      BUN 43*   CREATININE 2.0*   CALCIUM 9.8   PROT 7.5   ALBUMIN 3.7   BILITOT 0.4   ALKPHOS 76   AST 20   ALT 17   ANIONGAP 13     Cardiac Markers:   Recent Labs   Lab 01/10/23  2210   *       Significant Imaging: I have reviewed all pertinent imaging results/findings within the past 24 hours.  I have reviewed and interpreted all pertinent imaging results/findings within the past 24 hours.    Imaging Results    None       I have independently reviewed and interpreted the EKG.     I have independently reviewed all pertinent labs within the past 24 hours.    I have independently reviewed, visualized and interpreted all pertinent imaging results within the past 24 hours and discussed the findings with the ED physician, Dr. Galloway          Assessment/Plan:     PAF (paroxysmal atrial fibrillation)  -currently in atrial fibrillation, HR 50s to 120s.    -monitor on telemetry.    -cardiology consult in a.m..    -will hold beta-blockers at  this time.    -on apixaban at home.      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    -monitor      History of cornea transplant  -followed by Ophthalmology as outpatient        VTE Risk Mitigation (From admission, onward)         Ordered     Place sequential compression device  Until discontinued         01/11/23 0138                 The patient is placed in OBSERVATION status.      Sebastián Irby MD  Department of Hospital Medicine   '[facility] - Emergency Dept.

## 2023-01-11 NOTE — ED PROVIDER NOTES
SCRIBE #1 NOTE: I, Tin Yo, am scribing for, and in the presence of, Prasad Galloway Jr., MD. I have scribed the entire note.       History     Chief Complaint   Patient presents with    Shortness of Breath     Pt sent from PCP for afib. Pt states SOB x 1 wk. Hx of afib. States having cardiac ablasion done 11/2022. States HR increasing to 120 at home.     Review of patient's allergies indicates:   Allergen Reactions    Carvedilol Swelling     lips  lips  Other reaction(s): swelling    Cephalexin Other (See Comments)     Muscle/joint weakness unable to move     Doxycycline calcium Other (See Comments)     Weakness nausea   sob  Other reaction(s): weakness, nausea, SOB    Erythromycin Other (See Comments)     Complete weakness/could not walk    Gadolinium-containing contrast media Other (See Comments)     angioedema  Other reaction(s): Angioedema    Hydrocodone-acetaminophen      wheezing  wheezing  wheezing  Other reaction(s): wheezing    Inveltys [loteprednol etabonate] Palpitations and Other (See Comments)     Patient stated bp went up.    Labetalol Shortness Of Breath, Nausea Only and Other (See Comments)     Palpitations, LE weakness  Other reaction(s): SOB, palpitations, weakness    Loratadine Other (See Comments)     Double vision/spots  Other reaction(s): double vision    Macrolide antibiotics Other (See Comments)     Complete weakness/could not walk  Complete weakness/could not walk  Complete weakness/could not walk  Complete weakness/could not walk  Other reaction(s): complete weakness    Moxifloxacin Other (See Comments)     Hives   muscle soreness  Other reaction(s): hives, muscle soreness    Naproxen      wheezing  wheezing  wheezing  Other reaction(s): wheezing    Statins-hmg-coa reductase inhibitors Other (See Comments)     Severe Muscle weakness  Muscle weakness  Severe Muscle weakness  Other reaction(s): severe muscle weakness    Timolol Other (See Comments)     Blurred vision, Burning eyes,  Severe muscle weakness, eye problems.  Other reaction(s): severe muscle weakness    Verapamil Diarrhea     Severe diarrhea.  Other reaction(s): diarrhea    Amlodipine      Myalgias    Other reaction(s): myalgian    Doxycycline     Doxycycline hyclate      Other reaction(s): weakness, nausea, SOB    Fenofibrate      Causes muscle weakness  Other reaction(s): muscle weakness    Hydralazine      Other reaction(s): muscle tremors    Hydralazine analogues      Muscle tremors/aches      Hydrocortisone     Isosorbide      Tolerates Imdur    Loteprednol      Other reaction(s): increased BP    Tetanus and diphtheria toxoids     Adhesive Rash     Clonidine patch - 2 mfg - contact dermatitis    Codeine Diarrhea and Other (See Comments)     Loss of balance   Other reaction(s): loss of balance    Doxazosin Palpitations     Other reaction(s): palpitations    Fexofenadine Other (See Comments)     Double vision/spots   Other reaction(s): double vision    Hydrocortisone (bulk) Other (See Comments)     Only suppository/ caused muscle weakness    Latanoprost Other (See Comments)     Muscle weakness  Other reaction(s): muscle weakness    Olopatadine Other (See Comments)     Muscle weakness  Other reaction(s): muscle weakness    Prednisone Anxiety         History of Present Illness     HPI    1/11/2023, 1:25 AM  History obtained from the patient      History of Present Illness: Shirley Metz is a 89 y.o. female patient with a PMHx of anemia, A-fib, CKD, DM, HTN, and PVD who presents to the Emergency Department for evaluation of shortness of breath which onset gradually 2 weeks ago. Pt reports that, 2 weeks ago, she began to get short of breath on exertion, but it has worsened over the past couple days and is now constant. Today, pt was referred to the ED by her PCP for A-fib. Pt states that her pulse rate has ranged from  for the past couple of days. Symptoms are constant and moderate in severity. No mitigating or  exacerbating factors reported. Patient denies any CP, fever, chills, weakness, cough, nausea, and all other sxs at this time. No further complaints or concerns at this time.       Arrival mode: Personal vehicle    PCP: Heather Miller NP        Past Medical History:  Past Medical History:   Diagnosis Date    Anemia 11/29/2018    Anxiety 11/28/2017    Arthritis     Atrial fibrillation with RVR 10/9/2019    Back pain     Basal cell carcinoma 03/29/2018    left mid back    Chronic diastolic congestive heart failure 10/15/2019    CKD (chronic kidney disease) stage 3, GFR 30-59 ml/min 8/8/2016    Colon polyps     reported per patient.     Coronary artery calcification 10/5/2021    Fuchs' corneal dystrophy     General anesthetics causing adverse effect in therapeutic use     woke up during surgery and gagged on ET tube    Glaucoma     Glaucoma     Heart failure with preserved left ventricular function (HFpEF) 7/24/2018    Hyperlipidemia     Hypertension     Macular degeneration dry    Obesity     PVD (peripheral vascular disease) 4/26/2021    PVD (peripheral vascular disease) 4/26/2021    Squamous cell carcinoma 01/08/2019    left shoulder    Stenosis of carotid artery 10/5/2021    Trouble in sleeping     Type 2 diabetes mellitus without complication, without long-term current use of insulin 2/24/2021    Urinary tract infection        Past Surgical History:  Past Surgical History:   Procedure Laterality Date    ABLATION OF ARRHYTHMOGENIC FOCUS FOR ATRIAL FIBRILLATION N/A 11/3/2022    Procedure: Ablation atrial fibrillation;  Surgeon: Toi Kelly MD;  Location: [facility] EP LAB;  Service: Cardiology;  Laterality: N/A;  afib, PVI/CTI, RFA, STACEY (cx if SR), DEDE, rolanda, MB, 3 Prep    ADENOIDECTOMY  1938    BREAST BIOPSY Left     1997. benign    BREAST CYST EXCISION Left 1997 approx    CARPAL TUNNEL RELEASE Right 2012 approx    CATARACT EXTRACTION Bilateral 1994    CHOLECYSTECTOMY  1975    CORNEAL TRANSPLANT Bilateral  08/2015 8/2015 - left; Right 4/2016    EYE SURGERY      Fuch's Corneal dystrophy - had 2nd operation with Dr. Quintanilla    EYE SURGERY      Cataract wIOL    left thumb Left 2011    removed cuboid for arthritis    REVERSE TOTAL SHOULDER ARTHROPLASTY Left 8/21/2018    Procedure: ARTHROPLASTY, SHOULDER, TOTAL, REVERSE;  Surgeon: Solomon Lujan MD;  Location: [facility] OR;  Service: Orthopedics;  Laterality: Left;  WITH BICEP TENODESIS    SKIN CANCER EXCISION Left 2017    BCC removal by Dr. Valadez    SLT- OS (aka TRABECULOPLASTY) Left 05/11/2011    repeat 3/14/12    TENOTOMY Left 8/21/2018    Procedure: TENOTOMY;  Surgeon: Solomon Lujan MD;  Location: [facility] OR;  Service: Orthopedics;  Laterality: Left;    TONSILLECTOMY  1938    TREATMENT OF CARDIAC ARRHYTHMIA N/A 10/10/2019    Procedure: CARDIOVERSION;  Surgeon: Pete Durán MD;  Location: [facility] CATH LAB;  Service: Cardiology;  Laterality: N/A;    TREATMENT OF CARDIAC ARRHYTHMIA N/A 12/6/2019    Procedure: CARDIOVERSION;  Surgeon: Samy Castillo MD;  Location: [facility] CATH LAB;  Service: Cardiology;  Laterality: N/A;         Family History:  Family History   Problem Relation Age of Onset    Heart disease Mother     Hypertension Mother     Cancer Father 88        sarcoma( ?Kaposi's ) on leg    Deep vein thrombosis Neg Hx     Ovarian cancer Neg Hx     Breast cancer Neg Hx     Kidney disease Neg Hx     Strabismus Neg Hx     Retinal detachment Neg Hx     Macular degeneration Neg Hx     Glaucoma Neg Hx     Blindness Neg Hx     Amblyopia Neg Hx     Eczema Neg Hx     Lupus Neg Hx     Melanoma Neg Hx     Psoriasis Neg Hx     Diabetes Neg Hx     Stroke Neg Hx     Mental retardation Neg Hx     Mental illness Neg Hx     Hyperlipidemia Neg Hx     COPD Neg Hx     Asthma Neg Hx     Depression Neg Hx     Alcohol abuse Neg Hx     Drug abuse Neg Hx        Social History:  Social History     Tobacco Use    Smoking status: Never    Smokeless tobacco: Never    Tobacco comments:     history  of passive smoking from her ex-   Substance and Sexual Activity    Alcohol use: Yes     Alcohol/week: 2.0 standard drinks     Types: 2 Standard drinks or equivalent per week     Comment: occ    Drug use: No    Sexual activity: Not Currently     Partners: Male     Birth control/protection: Post-menopausal     Comment: discussed protection for STD's        Review of Systems     Review of Systems   Constitutional:  Negative for chills and fever.   HENT:  Negative for sore throat.    Respiratory:  Positive for shortness of breath. Negative for cough.    Cardiovascular:  Positive for palpitations. Negative for chest pain.   Gastrointestinal:  Negative for nausea.   Genitourinary:  Negative for dysuria.   Musculoskeletal:  Negative for back pain.   Skin:  Negative for rash.   Neurological:  Negative for weakness.   Hematological:  Does not bruise/bleed easily.   All other systems reviewed and are negative.     Physical Exam     Initial Vitals [01/10/23 2018]   BP Pulse Resp Temp SpO2   127/68 104 20 98.6 °F (37 °C) 98 %      MAP       --          Physical Exam  Nursing Notes and Vital Signs Reviewed.  Constitutional: Patient is in no acute distress. Well-developed and well-nourished.  Head: Atraumatic. Normocephalic.  Eyes:  EOM intact.  No scleral icterus.  ENT: Mucous membranes are moist.  Nares clear   Neck:  Full ROM. No JVD.  Cardiovascular:  Irregularly irregular rhythm No murmurs, rubs, or gallops. Distal pulses are 2+ and symmetric  Pulmonary/Chest: No respiratory distress. Clear to auscultation bilaterally. No wheezing or rales.  Equal chest wall rise bilaterally  Abdominal: Soft and non-distended.  There is no tenderness.  No rebound, guarding, or rigidity. Good bowel sounds.  Musculoskeletal: Moves all extremities. No obvious deformities.  5 x 5 strength in all extremities   Skin: Warm and dry.  Neurological:  Alert, awake, and appropriate.  Normal speech.  No acute focal neurological deficits are  "appreciated.  Two through 12 intact bilaterally.  Psychiatric: Normal affect. Good eye contact. Appropriate in content.       ED Course   Critical Care    Date/Time: 1/11/2023 1:31 AM  Performed by: Prasad Galloway Jr., MD  Authorized by: Prasad Galloway Jr., MD   Direct patient critical care time: 10 minutes  Additional history critical care time: 5 minutes  Ordering / reviewing critical care time: 10 minutes  Documentation critical care time: 5 minutes  Consulting other physicians critical care time: 5 minutes  Total critical care time (exclusive of procedural time) : 35 minutes  Critical care time was exclusive of separately billable procedures and treating other patients and teaching time.  Critical care was necessary to treat or prevent imminent or life-threatening deterioration of the following conditions: A-fib and elevated troponin.  Critical care was time spent personally by me on the following activities: blood draw for specimens, development of treatment plan with patient or surrogate, discussions with consultants, interpretation of cardiac output measurements, evaluation of patient's response to treatment, examination of patient, obtaining history from patient or surrogate, ordering and performing treatments and interventions, ordering and review of laboratory studies, ordering and review of radiographic studies, pulse oximetry, re-evaluation of patient's condition and review of old charts.      ED Vital Signs:  Vitals:    01/10/23 2018 01/11/23 0145 01/11/23 0149 01/11/23 0150   BP: 127/68 120/77 120/77    Pulse: 104 94 100 93   Resp: 20 20 (!) 28    Temp: 98.6 °F (37 °C)      TempSrc: Oral      SpO2: 98% 95% (!) 93%    Weight: 85.5 kg (188 lb 7.9 oz)      Height: 5' 4" (1.626 m)          Abnormal Lab Results:  Labs Reviewed   CBC W/ AUTO DIFFERENTIAL - Abnormal; Notable for the following components:       Result Value    RBC 3.49 (*)     Hemoglobin 11.4 (*)     Hematocrit 35.5 (*)      (*)     " MCH 32.7 (*)     All other components within normal limits   COMPREHENSIVE METABOLIC PANEL - Abnormal; Notable for the following components:    BUN 43 (*)     Creatinine 2.0 (*)     eGFR 23 (*)     All other components within normal limits   TROPONIN I - Abnormal; Notable for the following components:    Troponin I 0.194 (*)     All other components within normal limits   B-TYPE NATRIURETIC PEPTIDE - Abnormal; Notable for the following components:     (*)     All other components within normal limits   TROPONIN I        All Lab Results:  Results for orders placed or performed during the hospital encounter of 01/11/23   CBC auto differential   Result Value Ref Range    WBC 6.42 3.90 - 12.70 K/uL    RBC 3.49 (L) 4.00 - 5.40 M/uL    Hemoglobin 11.4 (L) 12.0 - 16.0 g/dL    Hematocrit 35.5 (L) 37.0 - 48.5 %     (H) 82 - 98 fL    MCH 32.7 (H) 27.0 - 31.0 pg    MCHC 32.1 32.0 - 36.0 g/dL    RDW 13.5 11.5 - 14.5 %    Platelets 207 150 - 450 K/uL    MPV 9.9 9.2 - 12.9 fL    Immature Granulocytes 0.5 0.0 - 0.5 %    Gran # (ANC) 4.0 1.8 - 7.7 K/uL    Immature Grans (Abs) 0.03 0.00 - 0.04 K/uL    Lymph # 1.5 1.0 - 4.8 K/uL    Mono # 0.8 0.3 - 1.0 K/uL    Eos # 0.2 0.0 - 0.5 K/uL    Baso # 0.04 0.00 - 0.20 K/uL    nRBC 0 0 /100 WBC    Gran % 62.1 38.0 - 73.0 %    Lymph % 22.6 18.0 - 48.0 %    Mono % 11.7 4.0 - 15.0 %    Eosinophil % 2.5 0.0 - 8.0 %    Basophil % 0.6 0.0 - 1.9 %    Differential Method Automated    Comprehensive metabolic panel   Result Value Ref Range    Sodium 142 136 - 145 mmol/L    Potassium 4.2 3.5 - 5.1 mmol/L    Chloride 105 95 - 110 mmol/L    CO2 24 23 - 29 mmol/L    Glucose 107 70 - 110 mg/dL    BUN 43 (H) 8 - 23 mg/dL    Creatinine 2.0 (H) 0.5 - 1.4 mg/dL    Calcium 9.8 8.7 - 10.5 mg/dL    Total Protein 7.5 6.0 - 8.4 g/dL    Albumin 3.7 3.5 - 5.2 g/dL    Total Bilirubin 0.4 0.1 - 1.0 mg/dL    Alkaline Phosphatase 76 55 - 135 U/L    AST 20 10 - 40 U/L    ALT 17 10 - 44 U/L    Anion Gap 13 8  - 16 mmol/L    eGFR 23 (A) >60 mL/min/1.73 m^2   Troponin I   Result Value Ref Range    Troponin I 0.194 (H) 0.000 - 0.026 ng/mL   Brain natriuretic peptide   Result Value Ref Range     (H) 0 - 99 pg/mL     *Note: Due to a large number of results and/or encounters for the requested time period, some results have not been displayed. A complete set of results can be found in Results Review.         Imaging Results:  Imaging Results    None          The EKG was ordered, reviewed, and independently interpreted by the ED provider.  Interpretation time: 16:57  Rate: 88 BPM  Rhythm: atrial fibrillation  Interpretation: Rightward axis. Low voltage QRS. Nonspecific ST and T wave abnormality. No STEMI.             The Emergency Provider reviewed the vital signs and test results, which are outlined above.     ED Discussion       1:30 AM: Discussed case with Rightward axis Low voltage QRS Nonspecific ST and T wave abnormality (Hospital Medicine). Dr. Irby agrees with current care and management of pt and accepts admission.   Admitting Service: Hospital Medicine  Admitting Physician: Dr. Irby  Admit to: Obs Tele    1:30 AM: Re-evaluated pt. I have discussed test results, shared treatment plan, and the need for admission with patient and family at bedside. Pt and family express understanding at this time and agree with all information. All questions answered. Pt and family have no further questions or concerns at this time. Pt is ready for admit.    Patient is stable and nontoxic.  The patient was referred from clinic at the request of cardiology for admission.  I reviewed old charts and found this in their clinic record.  I have also reviewed old charts and found that the patient has a history of paroxysmal AFib that was treated remotely with surgery.  Over the past several weeks this has been acting up with pulses between 40 and 120.  I ordered all laboratory studies and reviewed them independently.  The patient has  elevated troponin, elevated BNP.  I read the EKG independently and note that the patient is in atrial fibrillation.  I discussed the case with Hospital Medicine.  I discussed with them all findings well as plan of care.  Patient is high risk for decompensation in light of her elevated troponin.  She had been treated with aspirin and nitro paste.  She is being admitted for further evaluation and treatment.               ED Medication(s):  Medications   acetaminophen tablet 650 mg (has no administration in time range)   ondansetron injection 4 mg (has no administration in time range)   albuterol-ipratropium 2.5 mg-0.5 mg/3 mL nebulizer solution 3 mL (has no administration in time range)   aluminum-magnesium hydroxide-simethicone 200-200-20 mg/5 mL suspension 30 mL (has no administration in time range)   guaiFENesin 100 mg/5 ml syrup 200 mg (has no administration in time range)   aspirin tablet 325 mg (325 mg Oral Given 1/11/23 0144)   furosemide injection 60 mg (60 mg Intravenous Given 1/11/23 0144)   nitroGLYCERIN 2% TD oint ointment 1 inch (1 inch Topical (Top) Given 1/11/23 0144)       New Prescriptions    No medications on file               Scribe Attestation:   Scribe #1: I performed the above scribed service and the documentation accurately describes the services I performed. I attest to the accuracy of the note.     Attending:   Physician Attestation Statement for Scribe #1: I, Prasad Galloway Jr., MD, personally performed the services described in this documentation, as scribed by Tin Yo, in my presence, and it is both accurate and complete.           Clinical Impression       ICD-10-CM ICD-9-CM   1. Acute congestive heart failure, unspecified heart failure type  I50.9 428.0   2. Shortness of breath  R06.02 786.05   3. Paroxysmal atrial fibrillation  I48.0 427.31   4. Troponin level elevated  R77.8 790.6               Prasad Galloway Jr., MD  01/11/23 015

## 2023-01-11 NOTE — SUBJECTIVE & OBJECTIVE
Past Medical History:   Diagnosis Date    Anemia 11/29/2018    Anxiety 11/28/2017    Arthritis     Atrial fibrillation with RVR 10/9/2019    Back pain     Basal cell carcinoma 03/29/2018    left mid back    Chronic diastolic congestive heart failure 10/15/2019    CKD (chronic kidney disease) stage 3, GFR 30-59 ml/min 8/8/2016    Colon polyps     reported per patient.     Coronary artery calcification 10/5/2021    Fuchs' corneal dystrophy     General anesthetics causing adverse effect in therapeutic use     woke up during surgery and gagged on ET tube    Glaucoma     Glaucoma     Heart failure with preserved left ventricular function (HFpEF) 7/24/2018    Hyperlipidemia     Hypertension     Macular degeneration dry    Obesity     PVD (peripheral vascular disease) 4/26/2021    PVD (peripheral vascular disease) 4/26/2021    Squamous cell carcinoma 01/08/2019    left shoulder    Stenosis of carotid artery 10/5/2021    Trouble in sleeping     Type 2 diabetes mellitus without complication, without long-term current use of insulin 2/24/2021    Urinary tract infection        Past Surgical History:   Procedure Laterality Date    ABLATION OF ARRHYTHMOGENIC FOCUS FOR ATRIAL FIBRILLATION N/A 11/3/2022    Procedure: Ablation atrial fibrillation;  Surgeon: Toi Kelly MD;  Location: [facility] EP LAB;  Service: Cardiology;  Laterality: N/A;  afib, PVI/CTI, RFA, STACEY (cx if SR), DEDE, ABDIEL orantes, 3 Prep    ADENOIDECTOMY  1938    BREAST BIOPSY Left     1997. benign    BREAST CYST EXCISION Left 1997 approx    CARPAL TUNNEL RELEASE Right 2012 approx    CATARACT EXTRACTION Bilateral 1994    CHOLECYSTECTOMY  1975    CORNEAL TRANSPLANT Bilateral 08/2015 8/2015 - left; Right 4/2016    EYE SURGERY      Fuch's Corneal dystrophy - had 2nd operation with Dr. Quintanilla    EYE SURGERY      Cataract wIOL    left thumb Left 2011    removed cuboid for arthritis    REVERSE TOTAL SHOULDER ARTHROPLASTY Left 8/21/2018    Procedure: ARTHROPLASTY,  SHOULDER, TOTAL, REVERSE;  Surgeon: Solomon Lujan MD;  Location: [facility] OR;  Service: Orthopedics;  Laterality: Left;  WITH BICEP TENODESIS    SKIN CANCER EXCISION Left 2017    BCC removal by Dr. Rory MAHONEY- OS (aka TRABECULOPLASTY) Left 05/11/2011    repeat 3/14/12    TENOTOMY Left 8/21/2018    Procedure: TENOTOMY;  Surgeon: Solomon Lujan MD;  Location: [facility] OR;  Service: Orthopedics;  Laterality: Left;    TONSILLECTOMY  1938    TREATMENT OF CARDIAC ARRHYTHMIA N/A 10/10/2019    Procedure: CARDIOVERSION;  Surgeon: Pete Durán MD;  Location: [facility] CATH LAB;  Service: Cardiology;  Laterality: N/A;    TREATMENT OF CARDIAC ARRHYTHMIA N/A 12/6/2019    Procedure: CARDIOVERSION;  Surgeon: Samy Castillo MD;  Location: [facility] CATH LAB;  Service: Cardiology;  Laterality: N/A;       Review of patient's allergies indicates:   Allergen Reactions    Carvedilol Swelling     lips  lips  Other reaction(s): swelling    Cephalexin Other (See Comments)     Muscle/joint weakness unable to move     Doxycycline calcium Other (See Comments)     Weakness nausea   sob  Other reaction(s): weakness, nausea, SOB    Erythromycin Other (See Comments)     Complete weakness/could not walk    Gadolinium-containing contrast media Other (See Comments)     angioedema  Other reaction(s): Angioedema    Hydrocodone-acetaminophen      wheezing  wheezing  wheezing  Other reaction(s): wheezing    Inveltys [loteprednol etabonate] Palpitations and Other (See Comments)     Patient stated bp went up.    Labetalol Shortness Of Breath, Nausea Only and Other (See Comments)     Palpitations, LE weakness  Other reaction(s): SOB, palpitations, weakness    Loratadine Other (See Comments)     Double vision/spots  Other reaction(s): double vision    Macrolide antibiotics Other (See Comments)     Complete weakness/could not walk  Complete weakness/could not walk  Complete weakness/could not walk  Complete weakness/could not walk  Other reaction(s): complete  weakness    Moxifloxacin Other (See Comments)     Hives   muscle soreness  Other reaction(s): hives, muscle soreness    Naproxen      wheezing  wheezing  wheezing  Other reaction(s): wheezing    Statins-hmg-coa reductase inhibitors Other (See Comments)     Severe Muscle weakness  Muscle weakness  Severe Muscle weakness  Other reaction(s): severe muscle weakness    Timolol Other (See Comments)     Blurred vision, Burning eyes, Severe muscle weakness, eye problems.  Other reaction(s): severe muscle weakness    Verapamil Diarrhea     Severe diarrhea.  Other reaction(s): diarrhea    Amlodipine      Myalgias    Other reaction(s): myalgian    Doxycycline     Doxycycline hyclate      Other reaction(s): weakness, nausea, SOB    Fenofibrate      Causes muscle weakness  Other reaction(s): muscle weakness    Hydralazine      Other reaction(s): muscle tremors    Hydralazine analogues      Muscle tremors/aches      Hydrocortisone     Isosorbide      Tolerates Imdur    Loteprednol      Other reaction(s): increased BP    Tetanus and diphtheria toxoids     Adhesive Rash     Clonidine patch - 2 mfg - contact dermatitis    Codeine Diarrhea and Other (See Comments)     Loss of balance   Other reaction(s): loss of balance    Doxazosin Palpitations     Other reaction(s): palpitations    Fexofenadine Other (See Comments)     Double vision/spots   Other reaction(s): double vision    Hydrocortisone (bulk) Other (See Comments)     Only suppository/ caused muscle weakness    Latanoprost Other (See Comments)     Muscle weakness  Other reaction(s): muscle weakness    Olopatadine Other (See Comments)     Muscle weakness  Other reaction(s): muscle weakness    Prednisone Anxiety       Current Facility-Administered Medications on File Prior to Encounter   Medication    sodium chloride 0.9% flush 3 mL    vancomycin in dextrose 5 % 1 gram/250 mL IVPB 1,000 mg     Current Outpatient Medications on File Prior to Encounter   Medication Sig     acetaminophen (TYLENOL) 500 MG tablet Take 500 mg by mouth daily as needed. Taking 1 to 3    ALPRAZolam (XANAX) 0.5 MG tablet Take 1 tablet (0.5 mg total) by mouth nightly as needed for Anxiety.    apixaban (ELIQUIS) 2.5 mg Tab Take 1 tablet (2.5 mg total) by mouth 2 (two) times daily.    b complex vitamins capsule Take 1 capsule by mouth once daily.    brinzolamide (AZOPT) 1 % ophthalmic suspension Place 1 drop into the left eye 2 (two) times a day.    cholecalciferol, vitamin D3, (VITAMIN D3) 50 mcg (2,000 unit) Cap Take 1 capsule by mouth once daily.    coenzyme Q10 200 mg capsule Take 400 mg by mouth once daily.     fish oil-omega-3 fatty acids 300-1,000 mg capsule Take 2 g by mouth 2 (two) times daily.     furosemide (LASIX) 40 MG tablet Take 1 tablet (40 mg total) by mouth once daily. Alternate - 40mg twice a day and 40mg daily    losartan (COZAAR) 25 MG tablet Take 1 tablet (25 mg total) by mouth once daily.    metoprolol tartrate (LOPRESSOR) 25 MG tablet Take 0.5 tablets (12.5 mg total) by mouth 2 (two) times daily.    POLYETHYLENE GLYCOL 3350 (MIRALAX ORAL) Take 17 g by mouth 2 (two) times a day. Taking BID    RHOPRESSA 0.02 % ophthalmic solution PLACE 1 DROP INTO THE LEFT EYE EVERY EVENING.    TRAVATAN Z 0.004 % ophthalmic solution Place 1 drop into the left eye every evening.    [DISCONTINUED] cloNIDine (CATAPRES) 0.1 MG tablet Take 1 tablet (0.1 mg total) by mouth 2 (two) times daily as needed (systolic BP over 180).    [DISCONTINUED] isosorbide mononitrate (IMDUR) 30 MG 24 hr tablet Take 1 tablet (30 mg total) by mouth every evening.     Family History       Problem Relation (Age of Onset)    Cancer Father (88)    Heart disease Mother    Hypertension Mother          Tobacco Use    Smoking status: Never    Smokeless tobacco: Never    Tobacco comments:     history of passive smoking from her ex-   Substance and Sexual Activity    Alcohol use: Yes     Alcohol/week: 2.0 standard drinks     Types: 2  Standard drinks or equivalent per week     Comment: occ    Drug use: No    Sexual activity: Not Currently     Partners: Male     Birth control/protection: Post-menopausal     Comment: discussed protection for STD's     Review of Systems   Constitutional: Negative.  Negative for chills and fever.   HENT: Negative.  Negative for congestion, rhinorrhea, sore throat and trouble swallowing.    Eyes: Negative.  Negative for visual disturbance.   Respiratory:  Positive for shortness of breath. Negative for cough and wheezing.    Cardiovascular:  Positive for leg swelling. Negative for chest pain and palpitations.   Gastrointestinal: Negative.  Negative for abdominal pain, diarrhea, nausea and vomiting.   Endocrine: Negative.    Genitourinary: Negative.  Negative for dysuria and flank pain.   Musculoskeletal: Negative.  Negative for back pain.   Skin: Negative.  Negative for rash.   Allergic/Immunologic: Negative.    Neurological: Negative.  Negative for speech difficulty, weakness, numbness and headaches.   Hematological: Negative.    Psychiatric/Behavioral: Negative.  Negative for hallucinations. The patient is not nervous/anxious.    All other systems reviewed and are negative.  Objective:     Vital Signs (Most Recent):  Temp: 98.6 °F (37 °C) (01/10/23 2018)  Pulse: 104 (01/10/23 2018)  Resp: 20 (01/10/23 2018)  BP: 127/68 (01/10/23 2018)  SpO2: 98 % (01/10/23 2018) Vital Signs (24h Range):  Temp:  [97.6 °F (36.4 °C)-98.6 °F (37 °C)] 98.6 °F (37 °C)  Pulse:  [] 104  Resp:  [20] 20  SpO2:  [97 %-98 %] 98 %  BP: (125-127)/(68-76) 127/68     Weight: 85.5 kg (188 lb 7.9 oz)  Body mass index is 32.35 kg/m².    Physical Exam  Vitals and nursing note reviewed.   Constitutional:       General: She is awake. She is not in acute distress.     Appearance: She is obese. She is not ill-appearing.   HENT:      Head: Normocephalic and atraumatic.      Mouth/Throat:      Mouth: Mucous membranes are moist.   Eyes:      General: No  scleral icterus.     Conjunctiva/sclera: Conjunctivae normal.   Cardiovascular:      Rate and Rhythm: Tachycardia present. Rhythm irregular.      Heart sounds: No murmur heard.  Pulmonary:      Effort: Pulmonary effort is normal. No respiratory distress.      Breath sounds: Rales present. No wheezing.   Abdominal:      Palpations: Abdomen is soft.      Tenderness: There is no abdominal tenderness.   Musculoskeletal:         General: Swelling present. Normal range of motion.      Cervical back: Normal range of motion and neck supple.   Skin:     General: Skin is warm.      Coloration: Skin is not jaundiced.   Neurological:      General: No focal deficit present.      Mental Status: She is alert and oriented to person, place, and time. Mental status is at baseline.   Psychiatric:         Attention and Perception: Attention normal.         Speech: Speech normal.         Behavior: Behavior is cooperative.           Significant Labs: All pertinent labs within the past 24 hours have been reviewed.  BMP:   Recent Labs   Lab 01/10/23  2210         K 4.2      CO2 24   BUN 43*   CREATININE 2.0*   CALCIUM 9.8     CBC:   Recent Labs   Lab 01/10/23  2210   WBC 6.42   HGB 11.4*   HCT 35.5*        CMP:   Recent Labs   Lab 01/10/23  2210      K 4.2      CO2 24      BUN 43*   CREATININE 2.0*   CALCIUM 9.8   PROT 7.5   ALBUMIN 3.7   BILITOT 0.4   ALKPHOS 76   AST 20   ALT 17   ANIONGAP 13     Cardiac Markers:   Recent Labs   Lab 01/10/23  2210   *       Significant Imaging: I have reviewed all pertinent imaging results/findings within the past 24 hours.  I have reviewed and interpreted all pertinent imaging results/findings within the past 24 hours.    Imaging Results    None       I have independently reviewed and interpreted the EKG.     I have independently reviewed all pertinent labs within the past 24 hours.    I have independently reviewed, visualized and interpreted all  pertinent imaging results within the past 24 hours and discussed the findings with the ED physician, Dr. Galloway

## 2023-01-11 NOTE — ASSESSMENT & PLAN NOTE
-currently in atrial fibrillation, HR 50s to 120s.    -monitor on telemetry.    -cardiology consult in a.m..    -will hold beta-blockers at this time.    -on apixaban at home.

## 2023-01-11 NOTE — CONSULTS
[facility] - Emergency Dept.  Cardiology  Consult Note    Patient Name: Shirley Metz  MRN: [id number]  Admission Date: 1/11/2023  Hospital Length of Stay: 0 days  Code Status: Prior   Attending Provider: Nilda Gomez MD   Consulting Provider: Pete Durán MD  Primary Care Physician: Heather Miller NP  Principal Problem:<principal problem not specified>    Patient information was obtained from patient, past medical records, ER records and primary team.     Inpatient consult to Cardiology  Consult performed by: Pete Durán MD  Consult ordered by: Sebastián Irby MD  Reason for consult: Atrial Fibrillation        Subjective:     Chief Complaint:  dyspnea     HPI:   Cardiology consulted for a fib, CHF.  Pt has h/o PAF, diastolic CHF, CRI, HTN.  Sees Dr. Ramirez and Dr. Kelly, Cardiology.  Has had PAF issues last few years, h/o cardioversion and then ablation Nov 2022.  STACEY Nov 2022 normal LVEF.  Has had increased dyspnea x one week.  In ER noted to be in a fib, overall HR controlled but fluctuates.  Ecg showed a fib, rate 80's.  , up from last one.  Creatinine 2.0  Troponin 0.188 range, flat.  Per Dr. Ramirez's notes, pt sensitive to many meds.  Did not tolerate Amiodarone, Diltiazem in past.      Past Medical History:   Diagnosis Date    Anemia 11/29/2018    Anxiety 11/28/2017    Arthritis     Atrial fibrillation with RVR 10/9/2019    Back pain     Basal cell carcinoma 03/29/2018    left mid back    Chronic diastolic congestive heart failure 10/15/2019    CKD (chronic kidney disease) stage 3, GFR 30-59 ml/min 8/8/2016    Colon polyps     reported per patient.     Coronary artery calcification 10/5/2021    Fuchs' corneal dystrophy     General anesthetics causing adverse effect in therapeutic use     woke up during surgery and gagged on ET tube    Glaucoma     Glaucoma     Heart failure with preserved left ventricular function (HFpEF) 7/24/2018    Hyperlipidemia     Hypertension      Macular degeneration dry    Obesity     PVD (peripheral vascular disease) 4/26/2021    PVD (peripheral vascular disease) 4/26/2021    Squamous cell carcinoma 01/08/2019    left shoulder    Stenosis of carotid artery 10/5/2021    Trouble in sleeping     Type 2 diabetes mellitus without complication, without long-term current use of insulin 2/24/2021    Urinary tract infection        Past Surgical History:   Procedure Laterality Date    ABLATION OF ARRHYTHMOGENIC FOCUS FOR ATRIAL FIBRILLATION N/A 11/3/2022    Procedure: Ablation atrial fibrillation;  Surgeon: Toi Kelly MD;  Location: [facility] EP LAB;  Service: Cardiology;  Laterality: N/A;  afib, PVI/CTI, RFA, STACEY (cx if SR), DEDE, anes, MB, 3 Prep    ADENOIDECTOMY  1938    BREAST BIOPSY Left     1997. benign    BREAST CYST EXCISION Left 1997 approx    CARPAL TUNNEL RELEASE Right 2012 approx    CATARACT EXTRACTION Bilateral 1994    CHOLECYSTECTOMY  1975    CORNEAL TRANSPLANT Bilateral 08/2015 8/2015 - left; Right 4/2016    EYE SURGERY      Fuch's Corneal dystrophy - had 2nd operation with Dr. Quintanilla    EYE SURGERY      Cataract wIOL    left thumb Left 2011    removed cuboid for arthritis    REVERSE TOTAL SHOULDER ARTHROPLASTY Left 8/21/2018    Procedure: ARTHROPLASTY, SHOULDER, TOTAL, REVERSE;  Surgeon: Solomon Lujan MD;  Location: [facility] OR;  Service: Orthopedics;  Laterality: Left;  WITH BICEP TENODESIS    SKIN CANCER EXCISION Left 2017    BCC removal by Dr. Valadez    SLT- OS (aka TRABECULOPLASTY) Left 05/11/2011    repeat 3/14/12    TENOTOMY Left 8/21/2018    Procedure: TENOTOMY;  Surgeon: Solomon Lujan MD;  Location: [facility] OR;  Service: Orthopedics;  Laterality: Left;    TONSILLECTOMY  1938    TREATMENT OF CARDIAC ARRHYTHMIA N/A 10/10/2019    Procedure: CARDIOVERSION;  Surgeon: Pete Durán MD;  Location: [facility] CATH LAB;  Service: Cardiology;  Laterality: N/A;    TREATMENT OF CARDIAC ARRHYTHMIA N/A 12/6/2019     Procedure: CARDIOVERSION;  Surgeon: Samy Castillo MD;  Location: [facility] CATH LAB;  Service: Cardiology;  Laterality: N/A;       Review of patient's allergies indicates:   Allergen Reactions    Carvedilol Swelling     Lip swelling    Cephalexin Other (See Comments)     Muscle/joint weakness unable to move     Doxycycline Shortness Of Breath, Nausea And Vomiting and Other (See Comments)     weakness    Erythromycin Other (See Comments)     Complete weakness/could not walk    Gadolinium-containing contrast media Swelling     angioedema    Hydrocodone-acetaminophen Shortness Of Breath     wheezing    Inveltys [loteprednol etabonate] Palpitations and Other (See Comments)     Patient stated bp went up.    Labetalol Shortness Of Breath, Nausea Only, Palpitations and Other (See Comments)     weakness    Loratadine Other (See Comments)     Double vision/spots  Other reaction(s): double vision    Naproxen      wheezing  wheezing  wheezing  Other reaction(s): wheezing    Statins-hmg-coa reductase inhibitors Other (See Comments)     Severe Muscle weakness  Muscle weakness  Severe Muscle weakness  Other reaction(s): severe muscle weakness    Amlodipine Other (See Comments)     Myalgias    Fenofibrate Other (See Comments)     muscle weakness      Hydralazine Other (See Comments)     muscle tremors    Hydralazine analogues Other (See Comments)     Muscle tremors/aches      Loteprednol Other (See Comments)     increased BP    Macrolide antibiotics Other (See Comments)     Complete weakness/could not walk      Moxifloxacin Hives and Other (See Comments)     muscle soreness    Timolol Other (See Comments)     Blurred vision, Burning eyes, Severe muscle weakness, eye problems.      Verapamil Diarrhea     Severe diarrhea.      Hydrocortisone     Isosorbide      Tolerates Imdur    Tetanus and diphtheria toxoids     Adhesive Rash     Clonidine patch--contact dermatitis    Codeine Diarrhea and Other (See Comments)      Loss of balance       Doxazosin Palpitations    Fexofenadine Other (See Comments)     Double vision/spots       Hydrocortisone (bulk) Other (See Comments)     Only suppository/ caused muscle weakness    Latanoprost Other (See Comments)     Muscle weakness      Olopatadine Other (See Comments)     Muscle weakness      Prednisone Anxiety       Current Facility-Administered Medications on File Prior to Encounter   Medication    sodium chloride 0.9% flush 3 mL    vancomycin in dextrose 5 % 1 gram/250 mL IVPB 1,000 mg     Current Outpatient Medications on File Prior to Encounter   Medication Sig    acetaminophen (TYLENOL) 500 MG tablet Take 500 mg by mouth nightly as needed.    ALPRAZolam (XANAX) 0.5 MG tablet Take 1 tablet (0.5 mg total) by mouth nightly as needed for Anxiety.    apixaban (ELIQUIS) 2.5 mg Tab Take 1 tablet (2.5 mg total) by mouth 2 (two) times daily.    b complex vitamins capsule Take 1 capsule by mouth once daily.    brinzolamide (AZOPT) 1 % ophthalmic suspension Place 1 drop into the left eye 2 (two) times a day. (Patient taking differently: Place 1 drop into the left eye every morning.)    cholecalciferol, vitamin D3, (VITAMIN D3) 50 mcg (2,000 unit) Cap Take 1 capsule by mouth once daily.    coenzyme Q10 200 mg capsule Take 200 mg by mouth once daily.    fish oil-omega-3 fatty acids 300-1,000 mg capsule Take 1 capsule by mouth once daily.    furosemide (LASIX) 40 MG tablet Take 1 tablet (40 mg total) by mouth once daily. Alternate - 40mg twice a day and 40mg daily    losartan (COZAAR) 25 MG tablet Take 1 tablet (25 mg total) by mouth once daily.    metoprolol tartrate (LOPRESSOR) 25 MG tablet Take 0.5 tablets (12.5 mg total) by mouth 2 (two) times daily.    POLYETHYLENE GLYCOL 3350 (MIRALAX ORAL) Take 17 g by mouth 2 (two) times a day. Taking BID    RHOPRESSA 0.02 % ophthalmic solution PLACE 1 DROP INTO THE LEFT EYE EVERY EVENING.    TRAVATAN Z 0.004 % ophthalmic solution Place  1 drop into the left eye every evening.    [DISCONTINUED] cloNIDine (CATAPRES) 0.1 MG tablet Take 1 tablet (0.1 mg total) by mouth 2 (two) times daily as needed (systolic BP over 180).    [DISCONTINUED] isosorbide mononitrate (IMDUR) 30 MG 24 hr tablet Take 1 tablet (30 mg total) by mouth every evening.     Family History       Problem Relation (Age of Onset)    Cancer Father (88)    Heart disease Mother    Hypertension Mother          Tobacco Use    Smoking status: Never    Smokeless tobacco: Never    Tobacco comments:     history of passive smoking from her ex-   Substance and Sexual Activity    Alcohol use: Yes     Alcohol/week: 2.0 standard drinks     Types: 2 Standard drinks or equivalent per week     Comment: occ    Drug use: No    Sexual activity: Not Currently     Partners: Male     Birth control/protection: Post-menopausal     Comment: discussed protection for STD's     Review of Systems   Constitutional: Positive for malaise/fatigue.   HENT: Negative.     Eyes: Negative.    Cardiovascular:  Positive for dyspnea on exertion and palpitations.   Respiratory:  Positive for shortness of breath.    Endocrine: Negative.    Hematologic/Lymphatic: Negative.    Skin: Negative.    Musculoskeletal: Negative.    Gastrointestinal: Negative.    Genitourinary: Negative.    Neurological:  Positive for weakness.   Psychiatric/Behavioral: Negative.     Allergic/Immunologic: Negative.    Objective:     Vital Signs (Most Recent):  Temp: 97.6 °F (36.4 °C) (01/11/23 0900)  Pulse: 107 (01/11/23 0900)  Resp: (!) 31 (01/11/23 0900)  BP: (!) 151/86 (01/11/23 0844)  SpO2: 96 % (01/11/23 0900)   Vital Signs (24h Range):  Temp:  [97.6 °F (36.4 °C)-98.6 °F (37 °C)] 97.6 °F (36.4 °C)  Pulse:  [] 107  Resp:  [18-31] 31  SpO2:  [93 %-98 %] 96 %  BP: (112-151)/(62-92) 151/86     Weight: 85.5 kg (188 lb 7.9 oz)  Body mass index is 32.35 kg/m².    SpO2: 96 %       No intake or output data in the 24 hours ending 01/11/23  1148    Lines/Drains/Airways       Peripheral Intravenous Line  Duration                  Peripheral IV - Single Lumen 01/10/23 2211 22 G Posterior;Right Hand <1 day                    Physical Exam  Vitals and nursing note reviewed.   Constitutional:       General: She is not in acute distress.     Appearance: Normal appearance. She is well-developed. She is not ill-appearing or diaphoretic.   HENT:      Head: Normocephalic.   Neck:      Thyroid: No thyromegaly.      Vascular: Normal carotid pulses. No carotid bruit, hepatojugular reflux or JVD.   Cardiovascular:      Rate and Rhythm: Normal rate. Rhythm irregularly irregular.      Pulses: Normal pulses.           Radial pulses are 2+ on the right side and 2+ on the left side.      Heart sounds: Normal heart sounds, S1 normal and S2 normal. No murmur heard.    No friction rub. No gallop.   Pulmonary:      Effort: Pulmonary effort is normal.      Breath sounds: Examination of the right-lower field reveals decreased breath sounds. Examination of the left-lower field reveals decreased breath sounds. Decreased breath sounds present. No wheezing or rales.   Abdominal:      General: Bowel sounds are normal. There is no abdominal bruit.      Palpations: Abdomen is soft. There is no hepatomegaly, splenomegaly or mass.      Tenderness: There is no abdominal tenderness.   Musculoskeletal:      Cervical back: Neck supple.   Lymphadenopathy:      Cervical: No cervical adenopathy.   Skin:     General: Skin is warm.   Neurological:      Mental Status: She is alert and oriented to person, place, and time.   Psychiatric:         Behavior: Behavior normal. Behavior is cooperative.     Significant Labs: ABG: No results for input(s): PH, PCO2, HCO3, POCSATURATED, BE in the last 48 hours., Blood Culture: No results for input(s): LABBLOO in the last 48 hours., BMP:   Recent Labs   Lab 01/10/23  2210         K 4.2      CO2 24   BUN 43*   CREATININE 2.0*   CALCIUM 9.8    , CMP   Recent Labs   Lab 01/10/23  2210      K 4.2      CO2 24      BUN 43*   CREATININE 2.0*   CALCIUM 9.8   PROT 7.5   ALBUMIN 3.7   BILITOT 0.4   ALKPHOS 76   AST 20   ALT 17   ANIONGAP 13   , CBC   Recent Labs   Lab 01/10/23  2210   WBC 6.42   HGB 11.4*   HCT 35.5*      , INR No results for input(s): INR, PROTIME in the last 48 hours., and Troponin   Recent Labs   Lab 01/10/23  2210 01/11/23  0355   TROPONINI 0.194* 0.188*       Significant Imaging: Echocardiogram: Transthoracic echo (TTE) complete ([facility] Only):   Results for orders placed or performed during the hospital encounter of 07/11/22   Echo   Result Value Ref Range    BSA 1.94 m2    TDI SEPTAL 0.07 m/s    LV LATERAL E/E' RATIO 10.00 m/s    LV SEPTAL E/E' RATIO 15.71 m/s    IVC diameter 1.35 cm    Left Ventricular Outflow Tract Mean Velocity 0.686553907 cm/s    Left Ventricular Outflow Tract Mean Gradient 1.64 mmHg    TDI LATERAL 0.11 m/s    LVIDd 3.31 (A) 3.5 - 6.0 cm    IVS 1.55 (A) 0.6 - 1.1 cm    Posterior Wall 1.38 (A) 0.6 - 1.1 cm    Ao root annulus 3.06 cm    LVIDs 2.42 2.1 - 4.0 cm    FS 27 28 - 44 %    Sinus 3.25 cm    STJ 3.12 cm    Ascending aorta 3.06 cm    LV mass 172.58 g    LA size 3.11 cm    TAPSE 2.13 cm    Left Ventricle Relative Wall Thickness 0.83 cm    AV mean gradient 6 mmHg    AV valve area 2.07 cm2    AV Velocity Ratio 0.91     AV index (prosthetic) 0.61     MV valve area p 1/2 method 3.64 cm2    E/A ratio 3.24     Mean e' 0.09 m/s    E wave deceleration time 208.628935490704534 msec    IVRT 71.592035777832961 msec    LVOT diameter 2.08 cm    LVOT area 3.4 cm2    LVOT peak jus 1.20 m/s    LVOT peak VTI 15.90 cm    Ao peak jus 1.32 m/s    Ao VTI 26.1 cm    RVOT peak jus 0.90 m/s    RVOT peak VTI 5.6 cm    Mr max jus 4.52 m/s    LVOT stroke volume 54.00 cm3    AV peak gradient 7 mmHg    PV mean gradient 1.53 mmHg    E/E' ratio 12.22 m/s    MV Peak E Jus 1.10 m/s    TR Max Jus 2.74 m/s    MV stenosis  pressure 1/2 time 60.220795775304056 ms    MV Peak A Jus 0.34 m/s    LV Systolic Volume 20.57 mL    LV Systolic Volume Index 10.9 mL/m2    LV Diastolic Volume 44.46 mL    LV Diastolic Volume Index 23.52 mL/m2    LV Mass Index 91 g/m2    RA Major Axis 3.77 cm    Left Atrium Minor Axis 4.11 cm    Left Atrium Major Axis 4.21 cm    Triscuspid Valve Regurgitation Peak Gradient 30 mmHg    RA Width 3.26 cm    EF 60 %    Narrative    · Concentric remodeling and normal systolic function.  · The estimated ejection fraction is 60%.  · Normal left ventricular diastolic function.  · Normal right ventricular size with normal right ventricular systolic   function.        Assessment and Plan:     Troponin level elevated  Demand ischemia from a fib, age, diastolic CHF, CRI.    Acute on chronic diastolic congestive heart failure  Acute on chronic diastolic CHF, likely precipitated by being back in a fib.  IV Lasix 40 mg daily.  Low salt diet.    Will need close f/u with Dr. Ramirez, primary cardiologist, asap after discharge.      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  Stable.  Monitor.    PAF (paroxysmal atrial fibrillation)  PAF, s/p ablation Nov 2022.  Increase Metoprolol to 25 mg bid as tolerated.  Continue Eliquis.  Rate control strategy advised for now.  Will need to see Dr. Kelly, EP, for further mgt recs as outpt.    HTN (hypertension)  Continue current HTN meds.  Monitor.        VTE Risk Mitigation (From admission, onward)         Ordered     apixaban tablet 2.5 mg  2 times daily         01/11/23 0853     Place sequential compression device  Until discontinued         01/11/23 013                Thank you for your consult.     Pete Durán MD  Cardiology   [facility] - Emergency Dept.

## 2023-01-12 ENCOUNTER — TELEPHONE (OUTPATIENT)
Dept: CARDIOLOGY | Facility: HOSPITAL | Age: 88
End: 2023-01-12
Payer: MEDICARE

## 2023-01-12 VITALS
BODY MASS INDEX: 31.62 KG/M2 | SYSTOLIC BLOOD PRESSURE: 126 MMHG | OXYGEN SATURATION: 94 % | TEMPERATURE: 98 F | WEIGHT: 185.19 LBS | DIASTOLIC BLOOD PRESSURE: 74 MMHG | RESPIRATION RATE: 18 BRPM | HEART RATE: 76 BPM | HEIGHT: 64 IN

## 2023-01-12 LAB
ALBUMIN SERPL BCP-MCNC: 3.8 G/DL (ref 3.5–5.2)
ALP SERPL-CCNC: 76 U/L (ref 55–135)
ALT SERPL W/O P-5'-P-CCNC: 16 U/L (ref 10–44)
ANION GAP SERPL CALC-SCNC: 16 MMOL/L (ref 8–16)
AST SERPL-CCNC: 21 U/L (ref 10–40)
BASOPHILS # BLD AUTO: 0.06 K/UL (ref 0–0.2)
BASOPHILS NFR BLD: 0.9 % (ref 0–1.9)
BILIRUB SERPL-MCNC: 0.7 MG/DL (ref 0.1–1)
BUN SERPL-MCNC: 47 MG/DL (ref 8–23)
CALCIUM SERPL-MCNC: 9.9 MG/DL (ref 8.7–10.5)
CHLORIDE SERPL-SCNC: 102 MMOL/L (ref 95–110)
CO2 SERPL-SCNC: 18 MMOL/L (ref 23–29)
CREAT SERPL-MCNC: 2 MG/DL (ref 0.5–1.4)
DIFFERENTIAL METHOD: ABNORMAL
EOSINOPHIL # BLD AUTO: 0.2 K/UL (ref 0–0.5)
EOSINOPHIL NFR BLD: 3 % (ref 0–8)
ERYTHROCYTE [DISTWIDTH] IN BLOOD BY AUTOMATED COUNT: 13.3 % (ref 11.5–14.5)
EST. GFR  (NO RACE VARIABLE): 23 ML/MIN/1.73 M^2
GLUCOSE SERPL-MCNC: 109 MG/DL (ref 70–110)
HCT VFR BLD AUTO: 35.6 % (ref 37–48.5)
HGB BLD-MCNC: 11.5 G/DL (ref 12–16)
IMM GRANULOCYTES # BLD AUTO: 0.04 K/UL (ref 0–0.04)
IMM GRANULOCYTES NFR BLD AUTO: 0.6 % (ref 0–0.5)
LYMPHOCYTES # BLD AUTO: 1.4 K/UL (ref 1–4.8)
LYMPHOCYTES NFR BLD: 21.4 % (ref 18–48)
MAGNESIUM SERPL-MCNC: 2.1 MG/DL (ref 1.6–2.6)
MCH RBC QN AUTO: 32.2 PG (ref 27–31)
MCHC RBC AUTO-ENTMCNC: 32.3 G/DL (ref 32–36)
MCV RBC AUTO: 100 FL (ref 82–98)
MONOCYTES # BLD AUTO: 0.7 K/UL (ref 0.3–1)
MONOCYTES NFR BLD: 10.1 % (ref 4–15)
NEUTROPHILS # BLD AUTO: 4.2 K/UL (ref 1.8–7.7)
NEUTROPHILS NFR BLD: 64 % (ref 38–73)
NRBC BLD-RTO: 0 /100 WBC
PLATELET # BLD AUTO: 225 K/UL (ref 150–450)
PMV BLD AUTO: 10.1 FL (ref 9.2–12.9)
POTASSIUM SERPL-SCNC: 3.8 MMOL/L (ref 3.5–5.1)
PROT SERPL-MCNC: 7.8 G/DL (ref 6–8.4)
RBC # BLD AUTO: 3.57 M/UL (ref 4–5.4)
SODIUM SERPL-SCNC: 136 MMOL/L (ref 136–145)
WBC # BLD AUTO: 6.63 K/UL (ref 3.9–12.7)

## 2023-01-12 PROCEDURE — 83735 ASSAY OF MAGNESIUM: CPT | Mod: HCNC | Performed by: INTERNAL MEDICINE

## 2023-01-12 PROCEDURE — 80053 COMPREHEN METABOLIC PANEL: CPT | Mod: HCNC | Performed by: INTERNAL MEDICINE

## 2023-01-12 PROCEDURE — 25000003 PHARM REV CODE 250: Mod: HCNC | Performed by: INTERNAL MEDICINE

## 2023-01-12 PROCEDURE — 99214 PR OFFICE/OUTPT VISIT, EST, LEVL IV, 30-39 MIN: ICD-10-PCS | Mod: HCNC,ICN,,

## 2023-01-12 PROCEDURE — 63600175 PHARM REV CODE 636 W HCPCS: Mod: HCNC | Performed by: INTERNAL MEDICINE

## 2023-01-12 PROCEDURE — G0378 HOSPITAL OBSERVATION PER HR: HCPCS | Mod: HCNC

## 2023-01-12 PROCEDURE — 36415 COLL VENOUS BLD VENIPUNCTURE: CPT | Mod: HCNC | Performed by: INTERNAL MEDICINE

## 2023-01-12 PROCEDURE — 99214 OFFICE O/P EST MOD 30 MIN: CPT | Mod: HCNC,ICN,,

## 2023-01-12 PROCEDURE — 85025 COMPLETE CBC W/AUTO DIFF WBC: CPT | Mod: HCNC | Performed by: INTERNAL MEDICINE

## 2023-01-12 PROCEDURE — 96376 TX/PRO/DX INJ SAME DRUG ADON: CPT

## 2023-01-12 RX ORDER — FUROSEMIDE 40 MG/1
40 TABLET ORAL DAILY
Qty: 30 TABLET | Refills: 0
Start: 2023-01-12 | End: 2023-01-20

## 2023-01-12 RX ORDER — METOPROLOL TARTRATE 25 MG/1
25 TABLET, FILM COATED ORAL 2 TIMES DAILY
Qty: 60 TABLET | Refills: 11 | Status: SHIPPED | OUTPATIENT
Start: 2023-01-12 | End: 2023-03-16 | Stop reason: SDUPTHER

## 2023-01-12 RX ADMIN — FUROSEMIDE 40 MG: 10 INJECTION, SOLUTION INTRAMUSCULAR; INTRAVENOUS at 09:01

## 2023-01-12 RX ADMIN — APIXABAN 2.5 MG: 2.5 TABLET, FILM COATED ORAL at 09:01

## 2023-01-12 RX ADMIN — METOPROLOL TARTRATE 25 MG: 25 TABLET, FILM COATED ORAL at 09:01

## 2023-01-12 RX ADMIN — ACETAMINOPHEN 650 MG: 325 TABLET ORAL at 12:01

## 2023-01-12 NOTE — HOSPITAL COURSE
89 year old female, under the management of Hospital Medicine for CHF exacerbation and A. Fib with RVR. Rate controlled overnight with BB, treated with IV lasix with reported multiple episodes of urination overnight. Patient endorses improved SOB and dyspnea. Cardiology evaluated, recommended to increased metoprolol to 25mg PO BID, Lasix 40mg PO daily and follow-up in cardiology clinic, appointment scheduled with Dr. Ramirez her primary cardiologist. Patient seen and examined on day of discharge and determined stable for discharge.

## 2023-01-12 NOTE — ASSESSMENT & PLAN NOTE
Admitted in atrial fibrillation, HR 50s to 120s.    -monitor on telemetry.    -cardiology consult: completed     -on apixaban at home- resume  -Increased metoprolol to 25mg PO BID per cardiology

## 2023-01-12 NOTE — DISCHARGE SUMMARY
[facility] Medicine  Discharge Summary      Patient Name: Shirley Metz  MRN: [id number]  [facility]: [id number]  Patient Class: OP- Observation  Admission Date: 1/11/2023  Hospital Length of Stay: 0 days  Discharge Date and Time:  01/12/2023 12:33 PM  Attending Physician: Alfredo Guthrie, *   Discharging Provider: Amita Hopkins NP  Primary Care Provider: Heather Miller NP    Primary Care Team: Networked reference to record PCT     HPI:   Ms. Bettencourt is a elderly 89-year-old female with PMH significant for PAF on apixaban, CHF with preserved EF, carotid artery disease, hypertension, hyperlipidemia, CKD stage 3, has been complaining of progressively worsening SOB, dyspnea on exertion.  States that she has cut back on dose of diuretics due to increased urination.  She was evaluated by a PCP earlier today, was found to be in atrial fibrillation with RVR, hence sent to the ED for further evaluation.  HR ranging between 50s to 120s in the ED remains in atrial fibrillation.  Hemodynamically stable.  Creatinine 2.0, at baseline.  BNP 5 five eight, CXR reveals increased vascular congestion, bilateral pleural effusions.  Troponin elevated 0.194, denies chest pain.  Patient received Lasix 60 mg IV x1 along with aspirin 325 mg p.o. one time dose.      Admitting diagnosis:  Atrial fibrillation with RVR.      * No surgery found *      Hospital Course:   89 year old female, under the management of Hospital Medicine for CHF exacerbation and A. Fib with RVR. Rate controlled overnight with BB, treated with IV lasix with reported multiple episodes of urination overnight. Patient endorses improved SOB and dyspnea. Cardiology evaluated, recommended to increased metoprolol to 25mg PO BID, Lasix 40mg PO daily and follow-up in cardiology clinic, appointment scheduled with Dr. Ramirez her primary cardiologist. Patient seen and examined on day of discharge and determined stable for discharge.        Goals of Care  Treatment Preferences:  Code Status: Full Code      Consults:   Consults (From admission, onward)        Status Ordering Provider     Inpatient consult to Cardiology  Once        Provider:  Tran Nance MD    Completed STEVAN ZAVALA          * Paroxysmal atrial fibrillation  Admitted in atrial fibrillation, HR 50s to 120s.    -monitor on telemetry.    -cardiology consult: completed     -on apixaban at home- resume  -Increased metoprolol to 25mg PO BID per cardiology      Troponin level elevated  Likely secondary to demand    --Improved      Acute on chronic diastolic congestive heart failure  Patient is identified as having Diastolic (HFpEF) heart failure that is Acute on chronic. CHF is currently uncontrolled due to Continued edema of extremities. Latest ECHO performed and demonstrates- Results for orders placed during the hospital encounter of 07/11/22    Echo    Interpretation Summary  · Concentric remodeling and normal systolic function.  · The estimated ejection fraction is 60%.  · Normal left ventricular diastolic function.  · Normal right ventricular size with normal right ventricular systolic function.  . Continue Beta Blocker and Furosemide and monitor clinical status closely. Monitor on telemetry. Patient is on CHF pathway.  Monitor strict Is&Os and daily weights.  Place on fluid restriction of 1.5 L. Continue to stress to patient importance of self efficacy and  on diet for CHF. Last BNP reviewed- and noted below   Recent Labs   Lab 01/10/23  2210   *   .  Cardiology recommended to d/c with Lasix 40mg PO Daily  Treated with IV lasix with good response, SOB improved    Atrial fibrillation with RVR  Patient with Paroxysmal (<7 days) atrial fibrillation which is uncontrolled currently with Beta Blocker. Patient is currently in atrial fibrillation.[id number] Score: 4. . Anticoagulation indicated. Anticoagulation done with Eliquis       Increased metoprolol to 25mg PO BID per Cardiology,  rate controlled overnight, recommended to follow-up with Cardiology as OP. .        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    Specialty: Family Medicine  Contact information:  [address] [place] [address]  [phone]             Trevon Ramirez Md, MD Follow up on 1/18/2023.    Specialties: Cardiology, Internal Medicine  Why: As scheduled  Contact information:  [address]  [place] [address]  [phone]                       Patient Instructions:      Ambulatory referral/consult to [facility]   Standing Status: Future   Referral Priority: Routine Referral Type: Consultation   Referral Reason: Specialty Services Required   Number of Visits Requested: 1     Ambulatory referral/consult to [facility]   Standing Status: Future   Referral Priority: Routine Referral Type: Consultation   Referral Reason: Specialty Services Required   Requested Specialty: Cardiology   Number of Visits Requested: 1     Diet Cardiac      Notify your health care provider if you experience any of the following:  temperature >100.4     Notify your health care provider if you experience any of the following:  persistent nausea and vomiting or diarrhea     Notify your health care provider if you experience any of the following:  severe uncontrolled pain     No dressing needed     Activity as tolerated       Significant Diagnostic Studies: Labs:   CMP   Recent Labs   Lab 01/10/23  2210 01/12/23  0552    136   K 4.2 3.8    102   CO2 24 18*    109   BUN 43* 47*   CREATININE 2.0* 2.0*   CALCIUM 9.8 9.9   PROT 7.5 7.8   ALBUMIN 3.7 3.8   BILITOT 0.4 0.7   ALKPHOS 76 76   AST 20 21   ALT 17 16   ANIONGAP 13 16    and CBC   Recent Labs   Lab 01/10/23  2210 01/12/23  0923   WBC 6.42 6.63   HGB 11.4* 11.5*   HCT 35.5* 35.6*    225     Radiology: All imaging studies reviewed.     Pending Diagnostic Studies:     None         Medications:  Reconciled Home Medications:      Medication List      CHANGE how you take these medications    furosemide 40 MG tablet  Commonly known as: LASIX  Take 1 tablet (40 mg total) by mouth once daily.  What changed: additional instructions     metoprolol tartrate 25 MG tablet  Commonly known as: LOPRESSOR  Take 1 tablet (25 mg total) by mouth 2 (two) times daily.  What changed: how much to take        CONTINUE taking these medications    acetaminophen 500 MG tablet  Commonly known as: TYLENOL  Take 500 mg by mouth nightly as needed.     ALPRAZolam 0.5 MG tablet  Commonly known as: XANAX  Take 1 tablet (0.5 mg total) by mouth nightly as needed for Anxiety.     apixaban 2.5 mg Tab  Commonly known as: ELIQUIS  Take 1 tablet (2.5 mg total) by mouth 2 (two) times daily.     b complex vitamins capsule  Take 1 capsule by mouth once daily.     cholecalciferol (vitamin D3) 50 mcg (2,000 unit) Cap capsule  Commonly known as: VITAMIN D3  Take 1 capsule by mouth once daily.     coenzyme Q10 200 mg capsule  Take 200 mg  by mouth once daily.     fish oil-omega-3 fatty acids 300-1,000 mg capsule  Take 1 capsule by mouth once daily.     losartan 25 MG tablet  Commonly known as: COZAAR  Take 1 tablet (25 mg total) by mouth once daily.     MIRALAX ORAL  Take 17 g by mouth 2 (two) times a day. Taking BID     RHOPRESSA 0.02 % ophthalmic solution  Generic drug: netarsudiL  PLACE 1 DROP INTO THE LEFT EYE EVERY EVENING.     TRAVATAN Z 0.004 % ophthalmic solution  Generic drug: travoprost  Place 1 drop into the left eye every evening.        ASK your doctor about these medications    brinzolamide 1 % ophthalmic suspension  Commonly known as: AZOPT  Place 1 drop into the left eye 2 (two) times a day.            Indwelling Lines/Drains at time of discharge:   Lines/Drains/Airways     None                 Time spent on the discharge of patient: 75 minutes         Amita Hopkins NP  Department of Hospital Medicine  [facility]

## 2023-01-12 NOTE — PLAN OF CARE
Veronar left message with Padmini Healy at [facility] regarding Patients assistance.     Lele received call from Didi Healy at [facility] regarding patients needs.  Didi informed us that Patient is independent living and does not require bathing assistance. Didi stated she is mostly independent with ADLs.   Veronar asked about documentation for Patient to return, Didi said they do not need anything. Patient just has to wear a mask 5 days when she returns.     Didi also said they do not provide transportation back to [facility]. Advised to contact Patient's son for transportation.

## 2023-01-12 NOTE — HOSPITAL COURSE
1/12/23 Pt seen and examined today, feels ok reports trouble sleeping in hospital. She reports her SOB is improving. Labs reviewed, Crt 2.0. HR controlled 70-80s. Follow up with EP and Dr. Ramirez, lasix 40mg daily and metorpolol 25 BID

## 2023-01-12 NOTE — PROGRESS NOTES
[facility]  Cardiology  Progress Note    Patient Name: Shirley Metz  MRN: [id number]  Admission Date: 1/11/2023  Hospital Length of Stay: 0 days  Code Status: Prior   Attending Physician: Alfredo Guthrie, *   Primary Care Physician: Heather Miller NP  Expected Discharge Date:   Principal Problem:<principal problem not specified>    Subjective:   HPI:   Cardiology consulted for a fib, CHF.  Pt has h/o PAF, diastolic CHF, CRI, HTN.  Sees Dr. Ramirez and Dr. Kelly, Cardiology.  Has had PAF issues last few years, h/o cardioversion and then ablation Nov 2022.  STACEY Nov 2022 normal LVEF.  Has had increased dyspnea x one week.  In ER noted to be in a fib, overall HR controlled but fluctuates.  Ecg showed a fib, rate 80's.  , up from last one.  Creatinine 2.0  Troponin 0.188 range, flat.  Per Dr. Ramirez's notes, pt sensitive to many meds.  Did not tolerate Amiodarone, Diltiazem in past.  Hospital Course:   1/12/23 Pt seen and examined today, feels ok reports trouble sleeping in hospital. She reports her SOB is improving. Labs reviewed, Crt 2.0. HR controlled 70-80s. Follow up with EP and Dr. Ramirez, lasix 40mg daily and metorpolol 25 BID          Review of Systems   Constitutional: Positive for malaise/fatigue.   HENT: Negative.     Eyes: Negative.    Cardiovascular: Negative.    Respiratory:  Positive for shortness of breath.         Improving   Skin: Negative.    Musculoskeletal: Negative.    Gastrointestinal: Negative.    Genitourinary: Negative.    Neurological: Negative.    Psychiatric/Behavioral: Negative.     Objective:     Vital Signs (Most Recent):  Temp: 97.9 °F (36.6 °C) (01/12/23 1158)  Pulse: 76 (01/12/23 1158)  Resp: 18 (01/12/23 1158)  BP: 126/74 (01/12/23 1158)  SpO2: (!) 94 % (01/12/23 1158)   Vital Signs (24h Range):  Temp:  [97.4 °F (36.3 °C)-98.2 °F (36.8 °C)] 97.9 °F (36.6 °C)  Pulse:  [] 76  Resp:  [18-22] 18  SpO2:  [89 %-95 %] 94 %  BP: (108-126)/(62-83) 126/74      Weight: 84 kg (185 lb 3 oz)  Body mass index is 31.79 kg/m².     SpO2: (!) 94 %         Intake/Output Summary (Last 24 hours) at 1/12/2023 1158  Last data filed at 1/11/2023 1503  Gross per 24 hour   Intake --   Output 1 ml   Net -1 ml       Lines/Drains/Airways       Peripheral Intravenous Line  Duration                  Peripheral IV - Single Lumen 01/10/23 2211 22 G Posterior;Right Hand 1 day                    Physical Exam  Vitals and nursing note reviewed.   Constitutional:       Appearance: Normal appearance.   HENT:      Head: Normocephalic.   Eyes:      Pupils: Pupils are equal, round, and reactive to light.   Cardiovascular:      Rate and Rhythm: Normal rate. Rhythm irregular.      Heart sounds: Normal heart sounds, S1 normal and S2 normal. No murmur heard.    No S3 or S4 sounds.   Pulmonary:      Effort: Pulmonary effort is normal.      Breath sounds: Normal breath sounds.   Abdominal:      General: Bowel sounds are normal.      Palpations: Abdomen is soft.   Musculoskeletal:         General: Normal range of motion.      Cervical back: Normal range of motion.   Skin:     Capillary Refill: Capillary refill takes less than 2 seconds.   Neurological:      General: No focal deficit present.      Mental Status: She is alert and oriented to person, place, and time.   Psychiatric:         Mood and Affect: Mood normal.         Behavior: Behavior normal.         Thought Content: Thought content normal.       Significant Labs: BMP:   Recent Labs   Lab 01/10/23  2210 01/12/23  0552    109    136   K 4.2 3.8    102   CO2 24 18*   BUN 43* 47*   CREATININE 2.0* 2.0*   CALCIUM 9.8 9.9   MG  --  2.1   , CMP   Recent Labs   Lab 01/10/23  2210 01/12/23  0552    136   K 4.2 3.8    102   CO2 24 18*    109   BUN 43* 47*   CREATININE 2.0* 2.0*   CALCIUM 9.8 9.9   PROT 7.5 7.8   ALBUMIN 3.7 3.8   BILITOT 0.4 0.7   ALKPHOS 76 76   AST 20 21   ALT 17 16   ANIONGAP 13 16   , CBC   Recent  Labs   Lab 01/10/23  2210 01/12/23  0923   WBC 6.42 6.63   HGB 11.4* 11.5*   HCT 35.5* 35.6*    225   , INR No results for input(s): INR, PROTIME in the last 48 hours., Lipid Panel No results for input(s): CHOL, HDL, LDLCALC, TRIG, CHOLHDL in the last 48 hours., Troponin   Recent Labs   Lab 01/10/23  2210 01/11/23  0355   TROPONINI 0.194* 0.188*   , and All pertinent lab results from the last 24 hours have been reviewed.    Significant Imaging: Echocardiogram: Transthoracic echo (TTE) complete ([facility] Only):   Results for orders placed or performed during the hospital encounter of 07/11/22   Echo   Result Value Ref Range    BSA 1.94 m2    TDI SEPTAL 0.07 m/s    LV LATERAL E/E' RATIO 10.00 m/s    LV SEPTAL E/E' RATIO 15.71 m/s    IVC diameter 1.35 cm    Left Ventricular Outflow Tract Mean Velocity 0.395020715 cm/s    Left Ventricular Outflow Tract Mean Gradient 1.64 mmHg    TDI LATERAL 0.11 m/s    LVIDd 3.31 (A) 3.5 - 6.0 cm    IVS 1.55 (A) 0.6 - 1.1 cm    Posterior Wall 1.38 (A) 0.6 - 1.1 cm    Ao root annulus 3.06 cm    LVIDs 2.42 2.1 - 4.0 cm    FS 27 28 - 44 %    Sinus 3.25 cm    STJ 3.12 cm    Ascending aorta 3.06 cm    LV mass 172.58 g    LA size 3.11 cm    TAPSE 2.13 cm    Left Ventricle Relative Wall Thickness 0.83 cm    AV mean gradient 6 mmHg    AV valve area 2.07 cm2    AV Velocity Ratio 0.91     AV index (prosthetic) 0.61     MV valve area p 1/2 method 3.64 cm2    E/A ratio 3.24     Mean e' 0.09 m/s    E wave deceleration time 208.089432397771477 msec    IVRT 71.806827664357778 msec    LVOT diameter 2.08 cm    LVOT area 3.4 cm2    LVOT peak jus 1.20 m/s    LVOT peak VTI 15.90 cm    Ao peak jus 1.32 m/s    Ao VTI 26.1 cm    RVOT peak jus 0.90 m/s    RVOT peak VTI 5.6 cm    Mr max jus 4.52 m/s    LVOT stroke volume 54.00 cm3    AV peak gradient 7 mmHg    PV mean gradient 1.53 mmHg    E/E' ratio 12.22 m/s    MV Peak E Jus 1.10 m/s    TR Max Jus 2.74 m/s    MV stenosis pressure 1/2 time  60.413024893064922 ms    MV Peak A Jus 0.34 m/s    LV Systolic Volume 20.57 mL    LV Systolic Volume Index 10.9 mL/m2    LV Diastolic Volume 44.46 mL    LV Diastolic Volume Index 23.52 mL/m2    LV Mass Index 91 g/m2    RA Major Axis 3.77 cm    Left Atrium Minor Axis 4.11 cm    Left Atrium Major Axis 4.21 cm    Triscuspid Valve Regurgitation Peak Gradient 30 mmHg    RA Width 3.26 cm    EF 60 %    Narrative    · Concentric remodeling and normal systolic function.  · The estimated ejection fraction is 60%.  · Normal left ventricular diastolic function.  · Normal right ventricular size with normal right ventricular systolic   function.      , EKG: reviewed, Stress Test: reviewed, and X-Ray: CXR: X-Ray Chest 1 View (CXR): No results found for this visit on 01/11/23.    Assessment and Plan:       Troponin level elevated  Demand ischemia from a fib, age, diastolic CHF, CRI.    Acute on chronic diastolic congestive heart failure  Acute on chronic diastolic CHF, likely precipitated by being back in a fib.  IV Lasix 40 mg daily.  Low salt diet.    Will need close f/u with Dr. Ramirez, primary cardiologist, asap after discharge.    1/12/23  SOB improving today  Cont Lasix 40 daily  Low Na diet  Follow up with Dr. 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  Monitor.    PAF (paroxysmal atrial fibrillation)  PAF, s/p ablation Nov 2022.  Increase Metoprolol to 25 mg bid as tolerated.  Continue Eliquis.  Rate control strategy advised for now.  Will need to see Dr. Kelly, EP, for further mgt recs as outpt.    1/12/23  Cont BB, Eliquis  Follow up in clinic with Dr. Kelly    HTN (hypertension)  Continue current HTN meds.  Monitor.        VTE Risk Mitigation (From admission, onward)         Ordered     apixaban tablet 2.5 mg  2 times daily         01/11/23 0853     Place sequential compression device  Until discontinued         01/11/23 0138                Juany Amin,  NP  Cardiology  [facility]

## 2023-01-12 NOTE — PLAN OF CARE
[facility]  Initial Discharge Assessment       Primary Care Provider: Heather Miller NP    Admission Diagnosis: Shortness of breath [R06.02]  Paroxysmal atrial fibrillation [I48.0]  Troponin level elevated [R77.8]  Acute congestive heart failure, unspecified heart failure type [I50.9]    Admission Date: 1/11/2023  Expected Discharge Date:     Discharge Barriers Identified: None    Payor: [employer] MANAGED MEDICARE / Plan: [employer] MEDICARE HMO / Product Type: Capitation /     Extended Emergency Contact Information  Primary Emergency Contact: Arie Bettencourt  Address: [facility] [address] [address]           [place] [address] [facility]  Home Phone: [phone]  Mobile Phone: [phone]  Relation: Son  Secondary Emergency Contact: Yandy Bettencourt  Address: [facility] [address] [address] [facility]  Home Phone: [phone]  Work Phone: [phone]  Mobile Phone: [phone]  Relation: Relative    Discharge Plan A: Assisted Living         [facility] - [place] - [address]  [address] [facility]  [place] [address]  Phone: [phone] Fax: [phone]      Initial Assessment (most recent)       Adult Discharge Assessment - 01/12/23 1103          Discharge Assessment    Assessment Type Discharge Planning Assessment     Confirmed/corrected address, phone number and insurance Yes     Confirmed Demographics Correct on Facesheet     Source of Information patient     Communicated SHAYNA with patient/caregiver Date not available/Unable to determine     Reason For Admission shortness of breath     People in Home facility resident     Facility Arrived From: [facility] - resident     Do you expect to return to your current living situation? Yes     Do you have help at home or someone to help you manage your care at home? Yes     Who are your caregiver(s) and their phone number(s)? family, facility staff     Prior to  hospitilization cognitive status: Alert/Oriented     Current cognitive status: Alert/Oriented     Home Accessibility --   2nd floor apartment    Equipment Currently Used at Home walker, rolling     Readmission within 30 days? No     Patient currently being followed by outpatient case management? No     Do you currently have service(s) that help you manage your care at home? No     Do you take prescription medications? Yes     Do you have prescription coverage? Yes     Coverage [employer]     Do you have any problems affording any of your prescribed medications? No     Is the patient taking medications as prescribed? yes     Who is going to help you get home at discharge? Family, faciility staff     How do you get to doctors appointments? car, drives self     Are you on dialysis? No     Do you take coumadin? No     Discharge Plan A Assisted Living     DME Needed Upon Discharge  none     Discharge Plan discussed with: Patient     Discharge Barriers Identified None                      Anticipated DC dispo: Assisted Living Facility  Prior Level of Function: uses a rolling walker to ambulate  PCP: Heather Miller NP    Comments:  Swer met with patient at bedside to introduce role and discuss discharge planning. Patient lives at [facility] who will also be help at home and can provide transport at time of discharge. Swer discharge needs depends on hospital progress. Swer will continue following to assist with other needs.

## 2023-01-12 NOTE — ASSESSMENT & PLAN NOTE
Patient with Paroxysmal (<7 days) atrial fibrillation which is uncontrolled currently with Beta Blocker. Patient is currently in atrial fibrillation.[id number] Score: 4. . Anticoagulation indicated. Anticoagulation done with Eliquis       Increased metoprolol to 25mg PO BID per Cardiology, rate controlled overnight, recommended to follow-up with Cardiology as OP. .

## 2023-01-12 NOTE — SUBJECTIVE & OBJECTIVE
Review of Systems   Constitutional: Positive for malaise/fatigue.   HENT: Negative.     Eyes: Negative.    Cardiovascular: Negative.    Respiratory:  Positive for shortness of breath.         Improving   Skin: Negative.    Musculoskeletal: Negative.    Gastrointestinal: Negative.    Genitourinary: Negative.    Neurological: Negative.    Psychiatric/Behavioral: Negative.     Objective:     Vital Signs (Most Recent):  Temp: 97.9 °F (36.6 °C) (01/12/23 1158)  Pulse: 76 (01/12/23 1158)  Resp: 18 (01/12/23 1158)  BP: 126/74 (01/12/23 1158)  SpO2: (!) 94 % (01/12/23 1158)   Vital Signs (24h Range):  Temp:  [97.4 °F (36.3 °C)-98.2 °F (36.8 °C)] 97.9 °F (36.6 °C)  Pulse:  [] 76  Resp:  [18-22] 18  SpO2:  [89 %-95 %] 94 %  BP: (108-126)/(62-83) 126/74     Weight: 84 kg (185 lb 3 oz)  Body mass index is 31.79 kg/m².     SpO2: (!) 94 %         Intake/Output Summary (Last 24 hours) at 1/12/2023 1158  Last data filed at 1/11/2023 1503  Gross per 24 hour   Intake --   Output 1 ml   Net -1 ml       Lines/Drains/Airways       Peripheral Intravenous Line  Duration                  Peripheral IV - Single Lumen 01/10/23 2211 22 G Posterior;Right Hand 1 day                    Physical Exam  Vitals and nursing note reviewed.   Constitutional:       Appearance: Normal appearance.   HENT:      Head: Normocephalic.   Eyes:      Pupils: Pupils are equal, round, and reactive to light.   Cardiovascular:      Rate and Rhythm: Normal rate. Rhythm irregular.      Heart sounds: Normal heart sounds, S1 normal and S2 normal. No murmur heard.    No S3 or S4 sounds.   Pulmonary:      Effort: Pulmonary effort is normal.      Breath sounds: Normal breath sounds.   Abdominal:      General: Bowel sounds are normal.      Palpations: Abdomen is soft.   Musculoskeletal:         General: Normal range of motion.      Cervical back: Normal range of motion.   Skin:     Capillary Refill: Capillary refill takes less than 2 seconds.   Neurological:       General: No focal deficit present.      Mental Status: She is alert and oriented to person, place, and time.   Psychiatric:         Mood and Affect: Mood normal.         Behavior: Behavior normal.         Thought Content: Thought content normal.       Significant Labs: BMP:   Recent Labs   Lab 01/10/23  2210 01/12/23  0552    109    136   K 4.2 3.8    102   CO2 24 18*   BUN 43* 47*   CREATININE 2.0* 2.0*   CALCIUM 9.8 9.9   MG  --  2.1   , CMP   Recent Labs   Lab 01/10/23  2210 01/12/23  0552    136   K 4.2 3.8    102   CO2 24 18*    109   BUN 43* 47*   CREATININE 2.0* 2.0*   CALCIUM 9.8 9.9   PROT 7.5 7.8   ALBUMIN 3.7 3.8   BILITOT 0.4 0.7   ALKPHOS 76 76   AST 20 21   ALT 17 16   ANIONGAP 13 16   , CBC   Recent Labs   Lab 01/10/23  2210 01/12/23  0923   WBC 6.42 6.63   HGB 11.4* 11.5*   HCT 35.5* 35.6*    225   , INR No results for input(s): INR, PROTIME in the last 48 hours., Lipid Panel No results for input(s): CHOL, HDL, LDLCALC, TRIG, CHOLHDL in the last 48 hours., Troponin   Recent Labs   Lab 01/10/23  2210 01/11/23  0355   TROPONINI 0.194* 0.188*   , and All pertinent lab results from the last 24 hours have been reviewed.    Significant Imaging: Echocardiogram: Transthoracic echo (TTE) complete ([facility] Only):   Results for orders placed or performed during the hospital encounter of 07/11/22   Echo   Result Value Ref Range    BSA 1.94 m2    TDI SEPTAL 0.07 m/s    LV LATERAL E/E' RATIO 10.00 m/s    LV SEPTAL E/E' RATIO 15.71 m/s    IVC diameter 1.35 cm    Left Ventricular Outflow Tract Mean Velocity 0.733201152 cm/s    Left Ventricular Outflow Tract Mean Gradient 1.64 mmHg    TDI LATERAL 0.11 m/s    LVIDd 3.31 (A) 3.5 - 6.0 cm    IVS 1.55 (A) 0.6 - 1.1 cm    Posterior Wall 1.38 (A) 0.6 - 1.1 cm    Ao root annulus 3.06 cm    LVIDs 2.42 2.1 - 4.0 cm    FS 27 28 - 44 %    Sinus 3.25 cm    STJ 3.12 cm    Ascending aorta 3.06 cm    LV mass 172.58 g    LA size 3.11 cm     TAPSE 2.13 cm    Left Ventricle Relative Wall Thickness 0.83 cm    AV mean gradient 6 mmHg    AV valve area 2.07 cm2    AV Velocity Ratio 0.91     AV index (prosthetic) 0.61     MV valve area p 1/2 method 3.64 cm2    E/A ratio 3.24     Mean e' 0.09 m/s    E wave deceleration time 208.687648982007747 msec    IVRT 71.389852380247127 msec    LVOT diameter 2.08 cm    LVOT area 3.4 cm2    LVOT peak jus 1.20 m/s    LVOT peak VTI 15.90 cm    Ao peak jus 1.32 m/s    Ao VTI 26.1 cm    RVOT peak jus 0.90 m/s    RVOT peak VTI 5.6 cm    Mr max jus 4.52 m/s    LVOT stroke volume 54.00 cm3    AV peak gradient 7 mmHg    PV mean gradient 1.53 mmHg    E/E' ratio 12.22 m/s    MV Peak E Jus 1.10 m/s    TR Max Jus 2.74 m/s    MV stenosis pressure 1/2 time 60.686273086174965 ms    MV Peak A Jus 0.34 m/s    LV Systolic Volume 20.57 mL    LV Systolic Volume Index 10.9 mL/m2    LV Diastolic Volume 44.46 mL    LV Diastolic Volume Index 23.52 mL/m2    LV Mass Index 91 g/m2    RA Major Axis 3.77 cm    Left Atrium Minor Axis 4.11 cm    Left Atrium Major Axis 4.21 cm    Triscuspid Valve Regurgitation Peak Gradient 30 mmHg    RA Width 3.26 cm    EF 60 %    Narrative    · Concentric remodeling and normal systolic function.  · The estimated ejection fraction is 60%.  · Normal left ventricular diastolic function.  · Normal right ventricular size with normal right ventricular systolic   function.      , EKG: reviewed, Stress Test: reviewed, and X-Ray: CXR: X-Ray Chest 1 View (CXR): No results found for this visit on 01/11/23.

## 2023-01-12 NOTE — PLAN OF CARE
[facility] - Med Surg  Discharge Final Note    Primary Care Provider: Heather Miller NP    Expected Discharge Date: 1/12/2023    Final Discharge Note (most recent)       Final Note - 01/12/23 1300          Final Note    Assessment Type Final Discharge Note     Anticipated Discharge Disposition Home or Self Care     Hospital Resources/Appts/Education Provided Appointments scheduled by Navigator/Coordinator                     Important Message from Medicare             Contact Info       Heather Miller NP   Specialty: Family Medicine   Relationship: PCP - General    [address] [place] [address]   Phone: [phone]       Next Steps: Follow up in 3 day(s)    Trevon Ramirez Md, MD   Specialty: Cardiology, Internal Medicine   Relationship: Consulting Physician    [address] [place] [address]  [place] [address]   Phone: [phone]       Next Steps: Follow up on 1/18/2023    Instructions: As scheduled          DC Dispo: home to [facility]  Transportation: family  PCP F/U: Jan 17th  Cardiology F/U: Jan 18th

## 2023-01-12 NOTE — ASSESSMENT & PLAN NOTE
Patient is identified as having Diastolic (HFpEF) heart failure that is Acute on chronic. CHF is currently uncontrolled due to Continued edema of extremities. Latest ECHO performed and demonstrates- Results for orders placed during the hospital encounter of 07/11/22    Echo    Interpretation Summary  · Concentric remodeling and normal systolic function.  · The estimated ejection fraction is 60%.  · Normal left ventricular diastolic function.  · Normal right ventricular size with normal right ventricular systolic function.  . Continue Beta Blocker and Furosemide and monitor clinical status closely. Monitor on telemetry. Patient is on CHF pathway.  Monitor strict Is&Os and daily weights.  Place on fluid restriction of 1.5 L. Continue to stress to patient importance of self efficacy and  on diet for CHF. Last BNP reviewed- and noted below   Recent Labs   Lab 01/10/23  2210   *   .  Cardiology recommended to d/c with Lasix 40mg PO Daily  Treated with IV lasix with good response, SOB improved

## 2023-01-12 NOTE — PLAN OF CARE
Pt has been educated on medications and discharge instructions. VS are stable at this time and IV has been removed. NP was made aware of the medication that needed to be reconciled but pt did not want to wait for the reconciliation and was discharged.

## 2023-01-12 NOTE — ASSESSMENT & PLAN NOTE
PAF, s/p ablation Nov 2022.  Increase Metoprolol to 25 mg bid as tolerated.  Continue Eliquis.  Rate control strategy advised for now.  Will need to see Dr. Kelly, EP, for further mgt recs as outpt.    1/12/23  Cont BB, Eliquis  Follow up in clinic with Dr. Kelly

## 2023-01-12 NOTE — ASSESSMENT & PLAN NOTE
Acute on chronic diastolic CHF, likely precipitated by being back in a fib.  IV Lasix 40 mg daily.  Low salt diet.    Will need close f/u with Dr. Ramirez, primary cardiologist, asap after discharge.    1/12/23  SOB improving today  Cont Lasix 40 daily  Low Na diet  Follow up with Dr. Ramirez next week, appt scheduled

## 2023-01-17 ENCOUNTER — TELEPHONE (OUTPATIENT)
Dept: CARDIOLOGY | Facility: CLINIC | Age: 88
End: 2023-01-17
Payer: MEDICARE

## 2023-01-17 ENCOUNTER — OFFICE VISIT (OUTPATIENT)
Dept: PRIMARY CARE CLINIC | Facility: CLINIC | Age: 88
End: 2023-01-17
Payer: MEDICARE

## 2023-01-17 VITALS
TEMPERATURE: 98 F | WEIGHT: 185 LBS | SYSTOLIC BLOOD PRESSURE: 120 MMHG | HEIGHT: 64 IN | DIASTOLIC BLOOD PRESSURE: 55 MMHG | OXYGEN SATURATION: 96 % | BODY MASS INDEX: 31.58 KG/M2 | HEART RATE: 73 BPM

## 2023-01-17 DIAGNOSIS — I70.0 CALCIFICATION OF AORTA: ICD-10-CM

## 2023-01-17 DIAGNOSIS — I13.2 HYPERTENSIVE HEART AND RENAL DISEASE WITH BOTH (CONGESTIVE) HEART FAILURE AND RENAL FAILURE: ICD-10-CM

## 2023-01-17 DIAGNOSIS — D68.9 COAGULOPATHY: ICD-10-CM

## 2023-01-17 DIAGNOSIS — J84.10 PULMONARY GRANULOMA: ICD-10-CM

## 2023-01-17 DIAGNOSIS — Z78.9 STATIN INTOLERANCE: ICD-10-CM

## 2023-01-17 DIAGNOSIS — I48.91 ATRIAL FIBRILLATION WITH RVR: ICD-10-CM

## 2023-01-17 DIAGNOSIS — N19 HYPERTENSIVE HEART AND RENAL DISEASE WITH BOTH (CONGESTIVE) HEART FAILURE AND RENAL FAILURE: ICD-10-CM

## 2023-01-17 PROCEDURE — 1101F PT FALLS ASSESS-DOCD LE1/YR: CPT | Mod: HCNC,CPTII,S$GLB, | Performed by: NURSE PRACTITIONER

## 2023-01-17 PROCEDURE — 1159F MED LIST DOCD IN RCRD: CPT | Mod: HCNC,CPTII,S$GLB, | Performed by: NURSE PRACTITIONER

## 2023-01-17 PROCEDURE — 3288F PR FALLS RISK ASSESSMENT DOCUMENTED: ICD-10-PCS | Mod: HCNC,CPTII,S$GLB, | Performed by: NURSE PRACTITIONER

## 2023-01-17 PROCEDURE — 1159F PR MEDICATION LIST DOCUMENTED IN MEDICAL RECORD: ICD-10-PCS | Mod: HCNC,CPTII,S$GLB, | Performed by: NURSE PRACTITIONER

## 2023-01-17 PROCEDURE — 1160F RVW MEDS BY RX/DR IN RCRD: CPT | Mod: HCNC,CPTII,S$GLB, | Performed by: NURSE PRACTITIONER

## 2023-01-17 PROCEDURE — 1160F PR REVIEW ALL MEDS BY PRESCRIBER/CLIN PHARMACIST DOCUMENTED: ICD-10-PCS | Mod: HCNC,CPTII,S$GLB, | Performed by: NURSE PRACTITIONER

## 2023-01-17 PROCEDURE — 99999 PR PBB SHADOW E&M-EST. PATIENT-LVL IV: CPT | Mod: PBBFAC,HCNC,, | Performed by: NURSE PRACTITIONER

## 2023-01-17 PROCEDURE — 99999 PR PBB SHADOW E&M-EST. PATIENT-LVL IV: ICD-10-PCS | Mod: PBBFAC,HCNC,, | Performed by: NURSE PRACTITIONER

## 2023-01-17 PROCEDURE — 99215 PR OFFICE/OUTPT VISIT, EST, LEVL V, 40-54 MIN: ICD-10-PCS | Mod: HCNC,S$GLB,, | Performed by: NURSE PRACTITIONER

## 2023-01-17 PROCEDURE — 1101F PR PT FALLS ASSESS DOC 0-1 FALLS W/OUT INJ PAST YR: ICD-10-PCS | Mod: HCNC,CPTII,S$GLB, | Performed by: NURSE PRACTITIONER

## 2023-01-17 PROCEDURE — 99215 OFFICE O/P EST HI 40 MIN: CPT | Mod: HCNC,S$GLB,, | Performed by: NURSE PRACTITIONER

## 2023-01-17 PROCEDURE — 3288F FALL RISK ASSESSMENT DOCD: CPT | Mod: HCNC,CPTII,S$GLB, | Performed by: NURSE PRACTITIONER

## 2023-01-17 NOTE — TELEPHONE ENCOUNTER
----- Message from Vijaya Edmonds RN sent at 2023  4:24 PM CST -----  Regardin hour call, notify pt appt James on  cancelled

## 2023-01-17 NOTE — PROGRESS NOTES
Shirley Metz  01/17/2023  [id number]    Phylicia Deleon MD  Patient Care Team:  Phylicia Deleon MD as PCP - General (Internal Medicine)  Wilbert Ware MD as Consulting Physician (Ophthalmology)  Rajinder Quintanilla MD as Consulting Physician (Ophthalmology)  Suzan Gregorio PA-C as Physician Assistant (Rheumatology)  Rosario Valadez MD as Consulting Physician (Dermatology)  Trevon Ramirez MD as Consulting Physician (Cardiology)  Amish Mendoza MD (Pulmonary Disease)  Jaquan Choi MD as Consulting Physician (Vascular Surgery)  Debbie Choi MD (Inactive) as Consulting Physician (Gynecology)  Emmanuel Fish MD as Consulting Physician (Hand Surgery)  PAXTON Chaidez Jr., MD as Consulting Physician (Orthopedic Surgery)  Phylicia Deleon MD as Physician (Internal Medicine)  Heather Miller NP as Nurse Practitioner (Family Medicine)      [facility] Note      Chief Complaint:  Chief Complaint   Patient presents with    Hosptial f/u: Afib       History of Present Illness:  hospitals    Hospital follow up.  Admitted obs 1/11-1/12/23 for Afib with RVR, acute CHF with bilateral pleural effusions. She was diuresed with IV furosemide and recommended PO furosemide 40 mg daily and metoprolol 25 mg BID with outpatient cardiology follow up.     Has appt 1/19/23 with HF clinic.   Referred to [facility] at home.       Transitional Care Note    Family and/or Caretaker present at visit?  No.  Diagnostic tests reviewed/disposition: I have reviewed all completed as well as pending diagnostic tests at the time of discharge.  Disease/illness education: done  Home health/community services discussion/referrals: Patient does not have home health established from hospital visit.  They do not need home health.  If needed, we will set up home health for the patient.   Establishment or re-establishment of referral orders for community resources: No other necessary community resources.   Discussion  "with other health care providers: No discussion with other health care providers necessary.       Significant BLE weakness upon Admission and discharge. Improved now. Doesn't believe she needs HH PT.     Still some dyspnea. Some improvement yesterday. Still some RUIZ today, attributes to appt.   When dyspneic she has some BLE weakness.     Cannot keep appt with heart failure clinic due to transportation. Trying to reschedule.     Since discharge HR "okay."   60-80s. No palpitations.     Has taken additional 20 mg Lasix in afternoons if increased dyspnea. Not sure if this helps. Using 3 pillows to sleep. Still anxious. Stressed about SSI, bills, etc.     Lab Results   Component Value Date    [id number] 557 09/02/2022           Review of Systems   Constitutional:  Negative for chills, fever and malaise/fatigue.   Respiratory:  Positive for shortness of breath. Negative for cough and wheezing.    Cardiovascular:  Positive for orthopnea. Negative for chest pain, palpitations and leg swelling.   Gastrointestinal:  Negative for nausea and vomiting.   Genitourinary:  Negative for dysuria.   Musculoskeletal:  Negative for falls and myalgias.   Neurological:  Positive for focal weakness (BLE) and weakness. Negative for dizziness and headaches.       The following were reviewed: Active problem list, medication list, allergies, family history, social history, and Health Maintenance.     History:  Past Medical History:   Diagnosis Date    Anemia 11/29/2018    Anxiety 11/28/2017    Arthritis     Atrial fibrillation with RVR 10/9/2019    Back pain     Basal cell carcinoma 03/29/2018    left mid back    Chronic diastolic congestive heart failure 10/15/2019    CKD (chronic kidney disease) stage 3, GFR 30-59 ml/min 8/8/2016    Colon polyps     reported per patient.     Coronary artery calcification 10/5/2021    Fuchs' corneal dystrophy     General anesthetics causing adverse effect in therapeutic use     woke up during surgery and " gagged on ET tube    Glaucoma     Glaucoma     Heart failure with preserved left ventricular function (HFpEF) 7/24/2018    Hyperlipidemia     Hypertension     Macular degeneration dry    Obesity     PVD (peripheral vascular disease) 4/26/2021    PVD (peripheral vascular disease) 4/26/2021    Squamous cell carcinoma 01/08/2019    left shoulder    Stenosis of carotid artery 10/5/2021    Trouble in sleeping     Type 2 diabetes mellitus without complication, without long-term current use of insulin 2/24/2021    Urinary tract infection      Past Surgical History:   Procedure Laterality Date    ABLATION OF ARRHYTHMOGENIC FOCUS FOR ATRIAL FIBRILLATION N/A 11/3/2022    Procedure: Ablation atrial fibrillation;  Surgeon: Toi Kelly MD;  Location: [facility] EP LAB;  Service: Cardiology;  Laterality: N/A;  afib, PVI/CTI, RFA, STACEY (cx if SR), DEDE, ABDIEL orantes, 3 Prep    ADENOIDECTOMY  1938    BREAST BIOPSY Left     1997. benign    BREAST CYST EXCISION Left 1997 approx    CARPAL TUNNEL RELEASE Right 2012 approx    CATARACT EXTRACTION Bilateral 1994    CHOLECYSTECTOMY  1975    CORNEAL TRANSPLANT Bilateral 08/2015 8/2015 - left; Right 4/2016    EYE SURGERY      Fuch's Corneal dystrophy - had 2nd operation with Dr. Quintanilla    EYE SURGERY      Cataract wIOL    left thumb Left 2011    removed cuboid for arthritis    REVERSE TOTAL SHOULDER ARTHROPLASTY Left 8/21/2018    Procedure: ARTHROPLASTY, SHOULDER, TOTAL, REVERSE;  Surgeon: Solomon Lujan MD;  Location: [facility];  Service: Orthopedics;  Laterality: Left;  WITH BICEP TENODESIS    SKIN CANCER EXCISION Left 2017    BCC removal by Dr. Valadez    SLT- OS (aka TRABECULOPLASTY) Left 05/11/2011    repeat 3/14/12    TENOTOMY Left 8/21/2018    Procedure: TENOTOMY;  Surgeon: Solomon Lujan MD;  Location: [facility] OR;  Service: Orthopedics;  Laterality: Left;    TONSILLECTOMY  1938    TREATMENT OF CARDIAC ARRHYTHMIA N/A 10/10/2019    Procedure: CARDIOVERSION;  Surgeon: Pete Durán,  MD;  Location: [facility] CATH LAB;  Service: Cardiology;  Laterality: N/A;    TREATMENT OF CARDIAC ARRHYTHMIA N/A 12/6/2019    Procedure: CARDIOVERSION;  Surgeon: Samy Castillo MD;  Location: [facility] CATH LAB;  Service: Cardiology;  Laterality: N/A;     Family History   Problem Relation Age of Onset    Heart disease Mother     Hypertension Mother     Cancer Father 88        sarcoma( ?Kaposi's ) on leg    Deep vein thrombosis Neg Hx     Ovarian cancer Neg Hx     Breast cancer Neg Hx     Kidney disease Neg Hx     Strabismus Neg Hx     Retinal detachment Neg Hx     Macular degeneration Neg Hx     Glaucoma Neg Hx     Blindness Neg Hx     Amblyopia Neg Hx     Eczema Neg Hx     Lupus Neg Hx     Melanoma Neg Hx     Psoriasis Neg Hx     Diabetes Neg Hx     Stroke Neg Hx     Mental retardation Neg Hx     Mental illness Neg Hx     Hyperlipidemia Neg Hx     COPD Neg Hx     Asthma Neg Hx     Depression Neg Hx     Alcohol abuse Neg Hx     Drug abuse Neg Hx      Social History     Socioeconomic History    Marital status:     Number of children: 3   Tobacco Use    Smoking status: Never    Smokeless tobacco: Never    Tobacco comments:     history of passive smoking from her ex-   Substance and Sexual Activity    Alcohol use: Yes     Alcohol/week: 2.0 standard drinks     Types: 2 Standard drinks or equivalent per week     Comment: occ    Drug use: No    Sexual activity: Not Currently     Partners: Male     Birth control/protection: Post-menopausal     Comment: discussed protection for STD's   Other Topics Concern    Are you pregnant or think you may be? No    Breast-feeding No   Social History Narrative     x 2,  x 1. Retired artist - previously doing jewelry/wall pieces; ; lives in [place] [facility]; has 3 sons - 52( lives in [place], 54, and 56;exercises minimally - limited due to back issues. Still drives. Does have a Living Will.     Social Determinants of Health     Physical Activity: Unknown     Days of Exercise per Week: Patient refused    Minutes of Exercise per Session: 10 min   Stress: No Stress Concern Present    Feeling of Stress : Not at all     Patient Active Problem List   Diagnosis    HTN (hypertension)    Mixed hyperlipidemia    Primary osteoarthritis involving multiple joints    Macular degeneration - Both Eyes    Fuch's endothelial dystrophy - Both Eyes    Lens replaced by other means    COAG (chronic open-angle glaucoma) - Both Eyes    Blepharitis, unspecified - Both Eyes    Shortness of breath    Abnormal ECG    PVC's (premature ventricular contractions)    Other microscopic hematuria    LVH (left ventricular hypertrophy) due to hypertensive disease    Osteopenia of hip    Myalgia    Calcification of aorta    Neuropathy    Unequal blood pressures in paired extremities    Medication side effects    History of cornea transplant    Pseudophakia    Insomnia    Anxiety    Pre-diabetes    Obesity (BMI 30.0-34.9)    Elevated troponin    Bilateral shoulder region arthritis    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    Anemia    Thrombus of left atrial appendage    Paroxysmal atrial fibrillation    Elevated liver enzymes    Atrial flutter with rapid ventricular response    Chronic anticoagulation    Keratitis sicca, bilateral    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    PAD (peripheral artery disease)    Statin intolerance    Pulmonary nodules/lesions, multiple    Spinal stenosis at L4-L5 level    Pulmonary granuloma    Lumbar spondylosis    Bilateral recurrent inguinal hernia without obstruction or gangrene    LLQ abdominal pain    Pain in left leg    Rectal abnormality    Stenosis of carotid artery    Carotid artery calcification, bilateral    B12 deficiency    Seasonal allergic rhinitis    Atrial fibrillation with RVR    Elevated serum creatinine    Coagulopathy    Atheroma of artery    Acute on chronic diastolic congestive heart failure     Troponin level elevated     Review of patient's allergies indicates:   Allergen Reactions    Carvedilol Swelling     Lip swelling    Cephalexin Other (See Comments)     Muscle/joint weakness unable to move     Doxycycline Shortness Of Breath, Nausea And Vomiting and Other (See Comments)     weakness    Erythromycin Other (See Comments)     Complete weakness/could not walk    Gadolinium-containing contrast media Swelling     angioedema    Hydrocodone-acetaminophen Shortness Of Breath     wheezing    Inveltys [loteprednol etabonate] Palpitations and Other (See Comments)     Patient stated bp went up.    Labetalol Shortness Of Breath, Nausea Only, Palpitations and Other (See Comments)     weakness    Loratadine Other (See Comments)     Double vision/spots  Other reaction(s): double vision    Naproxen      wheezing  wheezing  wheezing  Other reaction(s): wheezing    Statins-hmg-coa reductase inhibitors Other (See Comments)     Severe Muscle weakness  Muscle weakness  Severe Muscle weakness  Other reaction(s): severe muscle weakness    Amlodipine Other (See Comments)     Myalgias    Fenofibrate Other (See Comments)     muscle weakness      Hydralazine Other (See Comments)     muscle tremors    Hydralazine analogues Other (See Comments)     Muscle tremors/aches      Loteprednol Other (See Comments)     increased BP    Macrolide antibiotics Other (See Comments)     Complete weakness/could not walk      Moxifloxacin Hives and Other (See Comments)     muscle soreness    Timolol Other (See Comments)     Blurred vision, Burning eyes, Severe muscle weakness, eye problems.      Verapamil Diarrhea     Severe diarrhea.      Hydrocortisone     Isosorbide      Tolerates Imdur    Tetanus and diphtheria toxoids     Adhesive Rash     Clonidine patch--contact dermatitis    Codeine Diarrhea and Other (See Comments)     Loss of balance       Doxazosin Palpitations    Fexofenadine Other (See Comments)     Double vision/spots        Hydrocortisone (bulk) Other (See Comments)     Only suppository/ caused muscle weakness    Latanoprost Other (See Comments)     Muscle weakness      Olopatadine Other (See Comments)     Muscle weakness      Prednisone Anxiety       Medications:  Current Outpatient Medications on File Prior to Visit   Medication Sig Dispense Refill    acetaminophen (TYLENOL) 500 MG tablet Take 500 mg by mouth nightly as needed.      ALPRAZolam (XANAX) 0.5 MG tablet Take 1 tablet (0.5 mg total) by mouth nightly as needed for Anxiety. 30 tablet 5    apixaban (ELIQUIS) 2.5 mg Tab Take 1 tablet (2.5 mg total) by mouth 2 (two) times daily. 180 tablet 3    b complex vitamins capsule Take 1 capsule by mouth once daily.      brinzolamide (AZOPT) 1 % ophthalmic suspension Place 1 drop into the left eye 2 (two) times a day. (Patient taking differently: Place 1 drop into the left eye every morning.) 5 mL 12    cholecalciferol, vitamin D3, (VITAMIN D3) 50 mcg (2,000 unit) Cap Take 1 capsule by mouth once daily.      coenzyme Q10 200 mg capsule Take 200 mg by mouth once daily.      fish oil-omega-3 fatty acids 300-1,000 mg capsule Take 1 capsule by mouth once daily.      furosemide (LASIX) 40 MG tablet Take 1 tablet (40 mg total) by mouth once daily. 30 tablet 0    losartan (COZAAR) 25 MG tablet Take 1 tablet (25 mg total) by mouth once daily. 180 tablet 1    metoprolol tartrate (LOPRESSOR) 25 MG tablet Take 1 tablet (25 mg total) by mouth 2 (two) times daily. 60 tablet 11    POLYETHYLENE GLYCOL 3350 (MIRALAX ORAL) Take 17 g by mouth 2 (two) times a day. Taking BID      RHOPRESSA 0.02 % ophthalmic solution PLACE 1 DROP INTO THE LEFT EYE EVERY EVENING. 2.5 mL 11    TRAVATAN Z 0.004 % ophthalmic solution Place 1 drop into the left eye every evening. 2.5 mL 12    [DISCONTINUED] cloNIDine (CATAPRES) 0.1 MG tablet Take 1 tablet (0.1 mg total) by mouth 2 (two) times daily as needed (systolic BP over 180). 20 tablet 1    [DISCONTINUED] isosorbide  mononitrate (IMDUR) 30 MG 24 hr tablet Take 1 tablet (30 mg total) by mouth every evening. 90 tablet 1     Current Facility-Administered Medications on File Prior to Visit   Medication Dose Route Frequency Provider Last Rate Last Admin    sodium chloride 0.9% flush 3 mL  3 mL Intravenous PRN Steve Galarza PA-C        vancomycin in dextrose 5 % 1 gram/250 mL IVPB 1,000 mg  1,000 mg Intravenous On Call Procedure Vianney Fountain NP           Medications have been reviewed and reconciled with patient at visit today.    Barriers to medications present (no )    Fall since last office visit (no )      Exam:  Vitals:    01/17/23 1003   BP: (!) 120/55   Pulse: 73   Temp: 97.7 °F (36.5 °C)     Weight: 83.9 kg (185 lb)   Body mass index is 31.76 kg/m².      BP Readings from Last 3 Encounters:   01/17/23 (!) 120/55   01/12/23 126/74   01/10/23 125/76     Wt Readings from Last 3 Encounters:   01/17/23 1003 83.9 kg (185 lb)   01/12/23 0040 84 kg (185 lb 3 oz)   01/10/23 2018 85.5 kg (188 lb 7.9 oz)   01/10/23 1521 85 kg (187 lb 6.4 oz)            Physical Exam  Constitutional:       General: She is not in acute distress.  HENT:      Head: Normocephalic.      Mouth/Throat:      Mouth: Mucous membranes are moist.   Eyes:      General: No scleral icterus.  Cardiovascular:      Rate and Rhythm: Normal rate. Rhythm irregular.   Pulmonary:      Effort: Pulmonary effort is normal.      Breath sounds: Rales (rt base with egophony.) present.   Musculoskeletal:      Cervical back: Neck supple.   Skin:     General: Skin is warm and dry.   Neurological:      General: No focal deficit present.      Mental Status: She is alert. Mental status is at baseline.      Motor: No weakness.   Psychiatric:         Mood and Affect: Mood normal.         Behavior: Behavior normal.         Thought Content: Thought content normal.       Laboratory Reviewed: (Yes)  Lab Results   Component Value Date    WBC 6.63 01/12/2023    HGB 11.5 (L)  01/12/2023    HCT 35.6 (L) 01/12/2023     01/12/2023    CHOL 168 08/31/2022    TRIG 98 08/31/2022    HDL 43 08/31/2022    ALT 16 01/12/2023    AST 21 01/12/2023     01/12/2023    K 3.8 01/12/2023     01/12/2023    CREATININE 2.0 (H) 01/12/2023    BUN 47 (H) 01/12/2023    CO2 18 (L) 01/12/2023    TSH 3.826 08/31/2022    INR 1.1 10/25/2022    HGBA1C 6.2 (H) 08/31/2022           Health Maintenance  Health Maintenance Topics with due status: Not Due       Topic Last Completion Date    Pneumococcal Vaccines (Age 65+) 03/16/2022    Hemoglobin A1c (Prediabetes) 08/31/2022    Lipid Panel 08/31/2022     Health Maintenance Due   Topic Date Due    Shingles Vaccine (1 of 2) Never done    TETANUS VACCINE  11/09/2021    COVID-19 Vaccine (4 - Booster for Pfizer series) 11/24/2021       Assessment:  Problem List Items Addressed This Visit          Pulmonary    Pulmonary granuloma     Previously followed by pulmonology.   Not interested in f/u at this time.   Consider annual f/u CT next month.            Cardiac/Vascular    Calcification of aorta (Chronic)     Continue HTN mgmt, fish oil OTC.         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  Continues furosemide, metoprolol, losartan with gradual improvement of sx.   Rate of Afib controlled.              Atrial fibrillation with RVR     Rate presently controlled with metoprolol.  Remains on apixiban. Needs f/u appt with Dr Kelly.            Hematology    Coagulopathy     Secondary to Afib. Remains on apixaban. No sx of bleeding.             Other    Statin intolerance     Lab Results   Component Value Date    LDLCALC 105.4 08/31/2022   Continues fish oil OTC. Not interested in additional tx.               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 Patient expressed understanding. Patient was given the opportunity to ask questions and be an active participant in their medical care. Patient had no further questions or concerns at this time.   -Documentation of patient's health and condition was obtained from family member who was present during visit.  -Patient is an overall moderate risk for health complications from their medical conditions.       Follow up: Follow up in about 4 weeks (around 2/14/2023).      After visit summary printed and given to patient upon discharge.  Patient goals and care plan are included in After visit summary.    Total medical decision making time was 40 min.  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    Health maintenance needs, recent test results and goals of care discussed with pt and questions answered.

## 2023-01-17 NOTE — TELEPHONE ENCOUNTER
"Heart Failure [facility]) hospital discharge 48-72 hour phone follow up completed.     Most Recent Hospital Discharge Date: 1/12/2023  Last admission Diagnosis/chief complaint:PAF    TCC nurse Navigator spoke with the patient.     Current Patient reported weight: 180 lb    Current Patient reported blood pressure and heart rate: 121/70, 69    Pt reports the following:  [x]  Shortness of Breath with Activity improved since hospital discharge  []  Shortness of Breath at rest   []  Fatigue  [x]  Edema improved since hospital discharge   [] Chest pain or tightness  [] Weight Increase since discharge  [] None of the above    Medications:   Discharge medication reviewed with pt.  Pt reports having medication list available and has all medications at home for use per list.     Education:   Pt will need a copy of "Home Care Guide for Heart Failure Patients" at hospital f/u appt.   Reviewed key points as listed below.     Recommend 2 -3 gram sodium restriction and 1500 cc-2000 cc fluid restriction.  Encourage physical activity with graded exercise program.  Requested patient to weigh themselves daily, and to notify us if their weight increases by more than 3 lbs in 1 day or 5 lbs in 3 days.   Reminded patient to use "Daily weight and symptom tracker" to record and guide patient on when and how to call [facility]. PT may also use symptom tracker if no scale available  Pt reports being in the green(color) Zone. If in yellow/red, reminded that they should be calling H[facility] today or now.     Watch for these Signs and Symptoms: If any of these occur, contact H[facility] immediately:   Increase in shortness of breath with movement   Increase in swelling in your legs and ankles   Weight gain of more than 3 pounds in a day or 5 pounds in 3 days.   Difficulty breathing when you are lying down   Worsening fatigue or tiredness   Stomach bloating, a full feeling or a loss of appetite   Increased coughing--especially when you " are lying down      Pt was able to verbalize back to nurse in their own words correct diet/fluid restrictions, necessity for exercise, warning signs and symptoms, when and how to contact their TCC team.      Pt educated on follow-up plan while in HFT[facility] program to include:   Week 1 -  F/u appt with Provider on 1/20/2022.   Week 2-5 - In person/ Virtual/ phone call check ins    Week 5-7 - Pt will discharge from [facility] and transition to longterm care provider (Cardiology/PCP/ Advanced Heart Failure).      Patient active on myChart? yes      Pt given the following contact information for ease of communication: [phone] (Mon-Fri, 8a-5p) & for urgent issues on the weekend call [facility] on-call [phone] to page the Cardiology MD on call.  Pt also encouraged utilize [facility] messaging as well.      Will follow up with pt at first clinic visit and HF nurse navigator available for pt questions, issues or concerns.

## 2023-01-17 NOTE — ASSESSMENT & PLAN NOTE
Rescheduling appt with HF clinic this week.  Continues furosemide, metoprolol, losartan with gradual improvement of sx.   Rate of Afib controlled.

## 2023-01-17 NOTE — ASSESSMENT & PLAN NOTE
Lab Results   Component Value Date    LDLCALC 105.4 08/31/2022     Continues fish oil OTC. Not interested in additional tx.

## 2023-01-17 NOTE — ASSESSMENT & PLAN NOTE
Previously followed by pulmonology.   Not interested in f/u at this time.   Consider annual f/u CT next month.

## 2023-01-18 ENCOUNTER — PES CALL (OUTPATIENT)
Dept: ADMINISTRATIVE | Facility: CLINIC | Age: 88
End: 2023-01-18
Payer: MEDICARE

## 2023-01-19 ENCOUNTER — TELEPHONE (OUTPATIENT)
Dept: CARDIOLOGY | Facility: CLINIC | Age: 88
End: 2023-01-19
Payer: MEDICARE

## 2023-01-19 NOTE — TELEPHONE ENCOUNTER
----- Message from Farideh Ramos MA sent at 1/18/2023  3:15 PM CST -----  Can her appt be moved up?    Farideh      ----- Message -----  From: Heather Miller NP  Sent: 1/17/2023  12:24 PM CST  To: Robin Cm Staff    Goo afternoon - Ms Bettencourt was hospitalized for Afib with RVR this weekend. Can she be scheduled for a hospital follow up with Dr Kelly sooner than 3/1/23? Rate is controlled with metoprolol. However she recently had symptomatic bradycardia at this dose of metoprolol and I fear it may reoccur.

## 2023-01-20 ENCOUNTER — OFFICE VISIT (OUTPATIENT)
Dept: CARDIOLOGY | Facility: CLINIC | Age: 88
End: 2023-01-20
Payer: MEDICARE

## 2023-01-20 VITALS
WEIGHT: 188.5 LBS | SYSTOLIC BLOOD PRESSURE: 120 MMHG | HEIGHT: 64 IN | BODY MASS INDEX: 32.18 KG/M2 | DIASTOLIC BLOOD PRESSURE: 80 MMHG | HEART RATE: 80 BPM

## 2023-01-20 DIAGNOSIS — I48.92 ATRIAL FLUTTER WITH RAPID VENTRICULAR RESPONSE: ICD-10-CM

## 2023-01-20 DIAGNOSIS — I50.33 ACUTE ON CHRONIC DIASTOLIC CONGESTIVE HEART FAILURE: ICD-10-CM

## 2023-01-20 PROCEDURE — 1126F PR PAIN SEVERITY QUANTIFIED, NO PAIN PRESENT: ICD-10-PCS | Mod: HCNC,CPTII,S$GLB, | Performed by: PHYSICIAN ASSISTANT

## 2023-01-20 PROCEDURE — 99204 PR OFFICE/OUTPT VISIT, NEW, LEVL IV, 45-59 MIN: ICD-10-PCS | Mod: HCNC,S$GLB,, | Performed by: PHYSICIAN ASSISTANT

## 2023-01-20 PROCEDURE — 99999 PR PBB SHADOW E&M-EST. PATIENT-LVL III: ICD-10-PCS | Mod: PBBFAC,HCNC,, | Performed by: PHYSICIAN ASSISTANT

## 2023-01-20 PROCEDURE — 99204 OFFICE O/P NEW MOD 45 MIN: CPT | Mod: HCNC,S$GLB,, | Performed by: PHYSICIAN ASSISTANT

## 2023-01-20 PROCEDURE — 1126F AMNT PAIN NOTED NONE PRSNT: CPT | Mod: HCNC,CPTII,S$GLB, | Performed by: PHYSICIAN ASSISTANT

## 2023-01-20 PROCEDURE — 99999 PR PBB SHADOW E&M-EST. PATIENT-LVL III: CPT | Mod: PBBFAC,HCNC,, | Performed by: PHYSICIAN ASSISTANT

## 2023-01-20 RX ORDER — FUROSEMIDE 40 MG/1
40 TABLET ORAL 2 TIMES DAILY
Qty: 30 TABLET | Refills: 0
Start: 2023-01-20 | End: 2023-02-24 | Stop reason: SDUPTHER

## 2023-01-20 NOTE — TELEPHONE ENCOUNTER
I have continued to wait for the opening of the schedule on feb 1 st in order will continue to looking pb

## 2023-01-20 NOTE — PROGRESS NOTES
[facility] Provider Note (Initial Clinic) Consult Note    Date of original referral: 1/18/2023  Age: 89 y.o.  Gender: female  Ethnicity: Not  or /a   Number of admissions for CHF within the preceding year: 3  Duration of CHF:   Type of Congestive Heart Failure: Systolic   Etiology: Tachycardia induced    Social history:   Enrolled in Infusion suite: no    Diagnostic Labs:   EKG - 01/11/2023  CXR - 01/10/2023  ECHO - 07/12/2022  Stress test -   Stress echo -   Pharmacologic stress -   Cardiac catheterization -    Cardiac MRI -     Lab Results   Component Value Date     01/12/2023     01/10/2023    K 3.8 01/12/2023    K 4.2 01/10/2023     01/12/2023     01/10/2023    CO2 18 (L) 01/12/2023    CO2 24 01/10/2023     01/12/2023     01/10/2023    BUN 47 (H) 01/12/2023    BUN 43 (H) 01/10/2023    CREATININE 2.0 (H) 01/12/2023    CREATININE 2.0 (H) 01/10/2023    CALCIUM 9.9 01/12/2023    CALCIUM 9.8 01/10/2023    PROT 7.8 01/12/2023    PROT 7.5 01/10/2023    ALBUMIN 3.8 01/12/2023    ALBUMIN 3.7 01/10/2023    BILITOT 0.7 01/12/2023    BILITOT 0.4 01/10/2023    ALKPHOS 76 01/12/2023    ALKPHOS 76 01/10/2023    AST 21 01/12/2023    AST 20 01/10/2023    ALT 16 01/12/2023    ALT 17 01/10/2023    ANIONGAP 16 01/12/2023    ANIONGAP 13 01/10/2023    ESTGFRAFRICA 30 (A) 07/26/2022    ESTGFRAFRICA 30 (A) 07/14/2022    EGFRNONAA 26 (A) 07/26/2022    EGFRNONAA 26 (A) 07/14/2022       Lab Results   Component Value Date    WBC 6.63 01/12/2023    WBC 6.42 01/10/2023    RBC 3.57 (L) 01/12/2023    RBC 3.49 (L) 01/10/2023    HGB 11.5 (L) 01/12/2023    HGB 11.4 (L) 01/10/2023    HCT 35.6 (L) 01/12/2023    HCT 35.5 (L) 01/10/2023     (H) 01/12/2023     (H) 01/10/2023    MCH 32.2 (H) 01/12/2023    MCH 32.7 (H) 01/10/2023    MCHC 32.3 01/12/2023    MCHC 32.1 01/10/2023    RDW 13.3 01/12/2023    RDW 13.5 01/10/2023     01/12/2023     01/10/2023    MPV 10.1 01/12/2023     MPV 9.9 01/10/2023    IMMGR 0.6 (H) 01/12/2023    IMMGR 0.5 01/10/2023    IGABS 0.04 01/12/2023    IGABS 0.03 01/10/2023    LYMPH 1.4 01/12/2023    LYMPH 21.4 01/12/2023    MONO 0.7 01/12/2023    MONO 10.1 01/12/2023    EOS 0.2 01/12/2023    EOS 0.2 01/10/2023    BASO 0.06 01/12/2023    BASO 0.04 01/10/2023    NRBC 0 01/12/2023    NRBC 0 01/10/2023    GRAN 4.2 01/12/2023    GRAN 64.0 01/12/2023    EOSINOPHIL 3.0 01/12/2023    EOSINOPHIL 2.5 01/10/2023    BASOPHIL 0.9 01/12/2023    BASOPHIL 0.6 01/10/2023       Lab Results   Component Value Date     (H) 01/10/2023     (H) 11/17/2022    MG 2.1 01/12/2023    MG 2.0 10/19/2022    PHOS 3.6 04/26/2022    PHOS 3.3 11/08/2021    NTPROBNP 760 (H) 12/22/2020    TROPONINI 0.188 (H) 01/11/2023    TROPONINI 0.194 (H) 01/10/2023    HGBA1C 6.2 (H) 08/31/2022    HGBA1C 5.9 (H) 03/08/2021    TSH 3.826 08/31/2022    TSH 1.412 01/24/2022       Lab Results   Component Value Date    CHOL 168 08/31/2022    TRIG 98 08/31/2022    HDL 43 08/31/2022    LDLCALC 105.4 08/31/2022    CHOLHDL 25.6 08/31/2022    TOTALCHOLEST 3.9 08/31/2022    NONHDLCHOL 125 08/31/2022    COLORU Yellow 08/31/2022    APPEARANCEUA Clear 08/31/2022    PHUR 6.0 08/31/2022    SPECGRAV 1.020 08/31/2022    PROTEINUA Trace (A) 08/31/2022    GLUCUA Negative 08/31/2022    KETONESU Negative 08/31/2022    BILIRUBINUA Negative 08/31/2022    OCCULTUA Negative 08/31/2022    NITRITE Negative 08/31/2022    LEUKOCYTESUR Negative 08/31/2022       List all implanted cardiac devices:       Current Outpatient Medications on File Prior to Visit   Medication Sig Dispense Refill    acetaminophen (TYLENOL) 500 MG tablet Take 500 mg by mouth nightly as needed.      ALPRAZolam (XANAX) 0.5 MG tablet Take 1 tablet (0.5 mg total) by mouth nightly as needed for Anxiety. 30 tablet 5    apixaban (ELIQUIS) 2.5 mg Tab Take 1 tablet (2.5 mg total) by mouth 2 (two) times daily. 180 tablet 3    b complex vitamins capsule Take 1 capsule by  mouth once daily.      brinzolamide (AZOPT) 1 % ophthalmic suspension Place 1 drop into the left eye 2 (two) times a day. (Patient taking differently: Place 1 drop into the left eye every morning.) 5 mL 12    cholecalciferol, vitamin D3, (VITAMIN D3) 50 mcg (2,000 unit) Cap Take 1 capsule by mouth once daily.      coenzyme Q10 200 mg capsule Take 200 mg by mouth once daily.      fish oil-omega-3 fatty acids 300-1,000 mg capsule Take 1 capsule by mouth once daily.      losartan (COZAAR) 25 MG tablet Take 1 tablet (25 mg total) by mouth once daily. 180 tablet 1    metoprolol tartrate (LOPRESSOR) 25 MG tablet Take 1 tablet (25 mg total) by mouth 2 (two) times daily. 60 tablet 11    POLYETHYLENE GLYCOL 3350 (MIRALAX ORAL) Take 17 g by mouth 2 (two) times a day. Taking BID      RHOPRESSA 0.02 % ophthalmic solution PLACE 1 DROP INTO THE LEFT EYE EVERY EVENING. 2.5 mL 11    TRAVATAN Z 0.004 % ophthalmic solution Place 1 drop into the left eye every evening. 2.5 mL 12    [DISCONTINUED] furosemide (LASIX) 40 MG tablet Take 1 tablet (40 mg total) by mouth once daily. 30 tablet 0    [DISCONTINUED] cloNIDine (CATAPRES) 0.1 MG tablet Take 1 tablet (0.1 mg total) by mouth 2 (two) times daily as needed (systolic BP over 180). 20 tablet 1    [DISCONTINUED] isosorbide mononitrate (IMDUR) 30 MG 24 hr tablet Take 1 tablet (30 mg total) by mouth every evening. 90 tablet 1     Current Facility-Administered Medications on File Prior to Visit   Medication Dose Route Frequency Provider Last Rate Last Admin    sodium chloride 0.9% flush 3 mL  3 mL Intravenous PRN Steve Galarza PA-C        vancomycin in dextrose 5 % 1 gram/250 mL IVPB 1,000 mg  1,000 mg Intravenous On Call Procedure Vianney Fountain NP             HPI:  Patient can walk around house, some SOB   Patient sleeps on 2 pillows   Patient wakes up SOB, has to get out of bed, associated cough, sputum  none   Palpitations - none   Dizzy, light-headed, pre-syncope or  syncope none   Since discharge frequency of performing weights, home weight and weight change stable   Other information felt pertinent to HPI    Ms. Bettencourt is a elderly 89-year-old female with PMH significant for PAF on apixaban, CHF with preserved EF, carotid artery disease, hypertension, hyperlipidemia, CKD stage 3, has been complaining of progressively worsening SOB, dyspnea on exertion.  States that she has cut back on dose of diuretics due to increased urination.  She was evaluated by a PCP earlier today, was found to be in atrial fibrillation with RVR, hence sent to the ED for further evaluation.  HR ranging between 50s to 120s in the ED remains in atrial fibrillation.  Hemodynamically stable.  Creatinine 2.0, at baseline.  BNP 5 five eight, CXR reveals increased vascular congestion, bilateral pleural effusions.  Troponin elevated 0.194, denies chest pain.  Patient received Lasix 60 mg IV x1 along with aspirin 325 mg p.o. one time dose.      Admitting diagnosis:  Atrial fibrillation with RVR.        89 year old female, under the management of Hospital Medicine for CHF exacerbation and A. Fib with RVR. Rate controlled overnight with BB, treated with IV lasix with reported multiple episodes of urination overnight. Patient endorses improved SOB and dyspnea. Cardiology evaluated, recommended to increased metoprolol to 25mg PO BID, Lasix 40mg PO daily and follow-up in cardiology clinic, appointment scheduled with Dr. Ramirez her primary cardiologist. Patient seen and examined on day of discharge and determined stable for discharge.     Today presents for first visit. Has been taking lasix 40/20 and does not feel like breathing has improved. Has sent a message to see if Dr Kelly can see her soon. Did not tolerate amiodarone/CCB in past.     Rate controlled now     PHYSICAL:   Vitals:    01/20/23 1112   BP: 120/80   Pulse: 80      Wt Readings from Last 3 Encounters:   01/20/23 85.5 kg (188 lb 7.9 oz)   01/17/23 83.9  kg (185 lb)   01/12/23 84 kg (185 lb 3 oz)       JVD: no   Heart rhythm: irregular  Cardiac murmur: No    S3: no  S4: no  Lungs: clear  Liver span: 10 cm:   Hepatojugular reflux: no  Edema: no      ASSESSMENT: a fib with RVR    PLAN:      Patient Instructions:   Instruct the patient to notify this clinic if HH, a physician or an advanced care provider wants to change medication one of their HF medications   Activity and Diet restrictions:   Recommend 2-3 gram sodium restriction and 1500cc- 2000cc fluid restriction.  Encourage physical activity with graded exercise program.  Requested patient to weigh themselves daily, and to notify us if their weight increases by more than 3 lbs in 1 day or 5 lbs in 3 days.    Assigned dry weight on home scale: 83 kg  Medication changes (include current dose and changed dose)  Increase lasix to 40 mg BID  Continue losartan 25 daily and lopresor 25BID  Upcoming labs and date anticipated: RTC 2 weeks

## 2023-01-23 ENCOUNTER — OFFICE VISIT (OUTPATIENT)
Dept: HOME HEALTH SERVICES | Facility: CLINIC | Age: 88
End: 2023-01-23
Payer: MEDICARE

## 2023-01-23 VITALS
HEART RATE: 71 BPM | DIASTOLIC BLOOD PRESSURE: 76 MMHG | OXYGEN SATURATION: 96 % | RESPIRATION RATE: 18 BRPM | SYSTOLIC BLOOD PRESSURE: 118 MMHG

## 2023-01-23 DIAGNOSIS — I48.91 ATRIAL FIBRILLATION WITH RVR: ICD-10-CM

## 2023-01-23 DIAGNOSIS — I50.33 ACUTE ON CHRONIC DIASTOLIC CONGESTIVE HEART FAILURE: Primary | ICD-10-CM

## 2023-01-23 PROCEDURE — 99350 PR HOME VISIT,ESTAB PATIENT,LEVEL IV: ICD-10-PCS | Mod: ,,,

## 2023-01-23 PROCEDURE — 99499 RISK ADDL DX/OHS AUDIT: ICD-10-PCS | Mod: S$GLB,,,

## 2023-01-23 PROCEDURE — 99499 UNLISTED E&M SERVICE: CPT | Mod: S$GLB,,,

## 2023-01-23 PROCEDURE — 99350 HOME/RES VST EST HIGH MDM 60: CPT | Mod: ,,,

## 2023-01-23 NOTE — PROGRESS NOTES
[facility]  Medical Home Visit    Visit Date: 1/23/2023  Encounter Provider: Ric Alaniz  PCP:  Phylicia Deleon MD    Subjective:      Patient ID: Shirley Metz is a 89 y.o. female.    Consult Requested By:  Amita Hopkins  Reason for Consult:  hospital follow up    Shirley is being seen at home due to being seen at home due to physical debility that presents a taxing effort to leave the home, to mitigate high risk of hospital readmission and/or due to the limited availability of reliable or safe options for transportation to the point of access to the provider. Prior to treatment on this visit the chart was reviewed and patient verbal consent was obtained.    Chief Complaint: Follow-up      Patient is a 89 yo female being seen today for a post hospital discharge assessment following a recent admit for Afib with RVR and acute CHF. Patient presented with a diagnosis of anemia, A-fib, CKD, DM, HTN, and PVD.  She was diuresed during her hospital stay and is on lasix twice daily.   Patient is found in their home today, in no distress and agreeable to this visit.  Her vital signs are stable, AAOx3.  She had a follow up with primary and heart failure clinic since hospital discharge. Patient reports some SOB with exertion and at rest which has slightly improved since hospital stay.  She has an appointment with Dr. Kelly on 2/1 and heart failure clinic on 2/3.  Patient reports compliance with all medications.  Patient has no further complaints or concerns at this time.   Questions elicited. Time allowed for questions, all issues addressed. Contact info given for any concerns or changes.            Review of Systems   Constitutional: Negative.    HENT: Negative.     Eyes: Negative.    Respiratory:  Positive for shortness of breath. Negative for chest tightness.    Cardiovascular: Negative.  Negative for leg swelling.   Gastrointestinal: Negative.    Endocrine: Negative.    Genitourinary: Negative.     Musculoskeletal:  Positive for gait problem.   Skin: Negative.    Allergic/Immunologic: Negative.    Neurological:  Positive for weakness.   Hematological: Negative.    Psychiatric/Behavioral: Negative.  Negative for agitation.    All other systems reviewed and are negative.    Assessments:  Environmental: Patient lives in [facility].  There is adequate lighting and the temperature is comfortable.    Functional Status: Ambulates with walker.    Safety: Fall precautions.   Nutritional: Adequate food available, two meals a day are provided by the facility per patient.     Home Health/DME/Supplies: No home health.  DME: walker    Objective:   Physical Exam  Vitals reviewed.   Constitutional:       General: She is not in acute distress.     Appearance: Normal appearance. She is well-developed. She is obese.   HENT:      Head: Normocephalic and atraumatic.      Nose: Nose normal.      Mouth/Throat:      Mouth: Mucous membranes are dry.   Eyes:      Pupils: Pupils are equal, round, and reactive to light.   Cardiovascular:      Rate and Rhythm: Normal rate. Rhythm irregular.      Pulses: Normal pulses.      Heart sounds: Normal heart sounds.   Pulmonary:      Effort: Pulmonary effort is normal.      Breath sounds: Normal breath sounds.   Abdominal:      General: Bowel sounds are normal.      Palpations: Abdomen is soft.   Musculoskeletal:         General: Normal range of motion.      Cervical back: Normal range of motion and neck supple.   Skin:     General: Skin is warm and dry.   Neurological:      General: No focal deficit present.      Mental Status: She is alert and oriented to person, place, and time. Mental status is at baseline.      Motor: Weakness present.      Gait: Gait abnormal.   Psychiatric:         Behavior: Behavior normal.         Thought Content: Thought content normal.         Judgment: Judgment normal.       Vitals:    01/23/23 1710   BP: 118/76   Pulse: 71   Resp:  18   SpO2: 96%     There is no height or weight on file to calculate BMI.    Assessment:     1. Acute on chronic diastolic congestive heart failure    2. Atrial fibrillation with RVR        Plan:     Ethical / Legal: Advance Care Planning   Surrogate decision maker:  Terrie Sargent, Relationship: Son  Code Status:  Full  [place]:  None  Other advance directive:  None, Capacity to make medical decisions:  Yes, Conflict No       Problem List Items Addressed This Visit          Cardiac/Vascular    Atrial fibrillation with RVR    Current Assessment & Plan     Stable  Rate controlled with BB, on eliquis  Continue current plan  F/U with Dr. Kelly 2/1  Monitor         Acute on chronic diastolic congestive heart failure - Primary    Current Assessment & Plan     Recent hospital encounter 1/11  Diuresed during hospital   On lasix twice daily  Recent   Monitor               - Continue all medications, treatments and therapies as ordered.   - Follow all instructions, recommendations as discussed.  - Maintain Safety Precautions at all times.  - Attend all medical appointments as scheduled.  - For worsening symptoms: call Primary Care Physician or Nurse Practitioner.  - For emergencies, call 911 or immediately report to the nearest emergency room.  - Limit Risks of environmental exposure to coronavirus/COVID-19 as discussed including: social distancing, hand hygiene, and limiting departures from the home for necessities only.   Weigh daily and record.  Activity as tolerated within patient's limitations.  Low salt, low fat and low choleterol diet and restrict fluid < 2L per day.  Contact for SOB, chest pain, weight gain > 2-3 lbs per day and/or 5-6 lbs per week.   No smoking. Annual influenza vaccine required. Take all medications as prescribed.  Reinforced importance of medication and diet compliance.     Were controlled substances prescribed?  No    Follow Up Appointments:   Future Appointments   Date Time Provider  Department Center   2/1/2023  9:45 AM Toi Kelly MD [facility]   2/1/2023 11:35 AM LABORATORY, [facility]   2/3/2023 10:00 AM JERARDO Villa [facility]   3/14/2023 11:00 AM Phylicia Deleon MD BS [id number]   3/29/2023 10:15 AM Rosario Valadez MD [facility] DERM [facility]   4/25/2023  1:00 PM FIELDS, VISUAL [facility] O[facility]THAL [facility]   4/25/2023  1:30 PM Wilbert Ware MD [facility]       Signature: Ric Alaniz NP

## 2023-01-23 NOTE — ASSESSMENT & PLAN NOTE
Stable  Rate controlled with BB, on eliquis  Continue current plan  F/U with Dr. Kelly 2/1  Monitor

## 2023-01-23 NOTE — ASSESSMENT & PLAN NOTE
Recent hospital encounter 1/11  Diuresed during hospital   On lasix twice daily  Recent   F/u with heart failure clinic 2/3  Monitor

## 2023-01-30 ENCOUNTER — OFFICE VISIT (OUTPATIENT)
Dept: CARDIOLOGY | Facility: CLINIC | Age: 88
End: 2023-01-30
Payer: MEDICARE

## 2023-01-30 ENCOUNTER — TELEPHONE (OUTPATIENT)
Dept: CARDIOLOGY | Facility: CLINIC | Age: 88
End: 2023-01-30
Payer: MEDICARE

## 2023-01-30 ENCOUNTER — LAB VISIT (OUTPATIENT)
Dept: LAB | Facility: HOSPITAL | Age: 88
End: 2023-01-30
Attending: PHYSICIAN ASSISTANT
Payer: MEDICARE

## 2023-01-30 VITALS
HEIGHT: 64 IN | BODY MASS INDEX: 32.37 KG/M2 | SYSTOLIC BLOOD PRESSURE: 110 MMHG | HEART RATE: 62 BPM | WEIGHT: 189.63 LBS | DIASTOLIC BLOOD PRESSURE: 60 MMHG

## 2023-01-30 DIAGNOSIS — I48.91 ATRIAL FIBRILLATION WITH RVR: ICD-10-CM

## 2023-01-30 DIAGNOSIS — I50.33 ACUTE ON CHRONIC DIASTOLIC CONGESTIVE HEART FAILURE: Primary | ICD-10-CM

## 2023-01-30 DIAGNOSIS — I50.33 ACUTE ON CHRONIC DIASTOLIC CONGESTIVE HEART FAILURE: ICD-10-CM

## 2023-01-30 LAB
ANION GAP SERPL CALC-SCNC: 13 MMOL/L (ref 8–16)
BNP SERPL-MCNC: 800 PG/ML (ref 0–99)
BUN SERPL-MCNC: 47 MG/DL (ref 8–23)
CALCIUM SERPL-MCNC: 9.4 MG/DL (ref 8.7–10.5)
CHLORIDE SERPL-SCNC: 104 MMOL/L (ref 95–110)
CO2 SERPL-SCNC: 26 MMOL/L (ref 23–29)
CREAT SERPL-MCNC: 2.3 MG/DL (ref 0.5–1.4)
EST. GFR  (NO RACE VARIABLE): 20 ML/MIN/1.73 M^2
GLUCOSE SERPL-MCNC: 106 MG/DL (ref 70–110)
POTASSIUM SERPL-SCNC: 4 MMOL/L (ref 3.5–5.1)
SODIUM SERPL-SCNC: 143 MMOL/L (ref 136–145)

## 2023-01-30 PROCEDURE — 99999 PR PBB SHADOW E&M-EST. PATIENT-LVL III: ICD-10-PCS | Mod: PBBFAC,HCNC,, | Performed by: PHYSICIAN ASSISTANT

## 2023-01-30 PROCEDURE — 1126F PR PAIN SEVERITY QUANTIFIED, NO PAIN PRESENT: ICD-10-PCS | Mod: HCNC,CPTII,S$GLB, | Performed by: PHYSICIAN ASSISTANT

## 2023-01-30 PROCEDURE — 1160F RVW MEDS BY RX/DR IN RCRD: CPT | Mod: HCNC,CPTII,S$GLB, | Performed by: PHYSICIAN ASSISTANT

## 2023-01-30 PROCEDURE — 1126F AMNT PAIN NOTED NONE PRSNT: CPT | Mod: HCNC,CPTII,S$GLB, | Performed by: PHYSICIAN ASSISTANT

## 2023-01-30 PROCEDURE — 80048 BASIC METABOLIC PNL TOTAL CA: CPT | Mod: HCNC | Performed by: PHYSICIAN ASSISTANT

## 2023-01-30 PROCEDURE — 36415 COLL VENOUS BLD VENIPUNCTURE: CPT | Mod: HCNC | Performed by: PHYSICIAN ASSISTANT

## 2023-01-30 PROCEDURE — 99213 PR OFFICE/OUTPT VISIT, EST, LEVL III, 20-29 MIN: ICD-10-PCS | Mod: HCNC,S$GLB,, | Performed by: PHYSICIAN ASSISTANT

## 2023-01-30 PROCEDURE — 1159F MED LIST DOCD IN RCRD: CPT | Mod: HCNC,CPTII,S$GLB, | Performed by: PHYSICIAN ASSISTANT

## 2023-01-30 PROCEDURE — 1160F PR REVIEW ALL MEDS BY PRESCRIBER/CLIN PHARMACIST DOCUMENTED: ICD-10-PCS | Mod: HCNC,CPTII,S$GLB, | Performed by: PHYSICIAN ASSISTANT

## 2023-01-30 PROCEDURE — 99213 OFFICE O/P EST LOW 20 MIN: CPT | Mod: HCNC,S$GLB,, | Performed by: PHYSICIAN ASSISTANT

## 2023-01-30 PROCEDURE — 1159F PR MEDICATION LIST DOCUMENTED IN MEDICAL RECORD: ICD-10-PCS | Mod: HCNC,CPTII,S$GLB, | Performed by: PHYSICIAN ASSISTANT

## 2023-01-30 PROCEDURE — 83880 ASSAY OF NATRIURETIC PEPTIDE: CPT | Mod: HCNC | Performed by: PHYSICIAN ASSISTANT

## 2023-01-30 PROCEDURE — 99999 PR PBB SHADOW E&M-EST. PATIENT-LVL III: CPT | Mod: PBBFAC,HCNC,, | Performed by: PHYSICIAN ASSISTANT

## 2023-01-30 NOTE — TELEPHONE ENCOUNTER
"Pt called with c/o feeling weak. Says she is having trouble walking even with her walker.   Feeling out of breath, stomach bloated, loss of appetite, ankles were swollen yesterday but ok today.     Also body pains and nauseous this morning.   Mostly has been taking lasix 40 mg in am and 20 mg in pm    Says her bp has been "good" and her wt "stable" on home scale.     Please advise    "

## 2023-01-30 NOTE — TELEPHONE ENCOUNTER
JERARDO Villa  You 3 hours ago (10:07 AM)     She can increase lasix to 40 mg BID until she sees EP on 2/1.     I called pt back with instructions.     She now says this is the dosage she has been taking and she only took lasix 40 mg in am and 20 mg in pm a couple of times when she felt her back and side started hurting and she was worried it was her kidneys.     Pt then asked if she could just move her appt up to today because she just isn't feeling well and would prefer not to wait until Wednesday    Provider notified. Pt worked in to be seen today.

## 2023-01-30 NOTE — PROGRESS NOTES
[facility] Provider Note (Follow-up) Consult Note      HPI:  Patient can walk in house, using WC today   Patient sleeps on 2 pillows   Patient wakes up SOB, has to get out of bed, associated cough, sputum none   Palpitations - none   Dizzy, light-headed, pre-syncope or syncope-none   Since discharge frequency of performing weights, home weight and weight change-increased 1 pound (feels appetite down)   Other information felt pertinent to HPI    Since last visit, has stopped amiodarone and cardizem for side effects. Felt better. Has been doing lasix 40 mg BID. LE edema improved. Here for urgent visit     She feels she is weak, not eating (clinic weight up 1 pound), nauseated, also has some lips cracking that are bothersome. BP and weight/HR log reviewed, all WNL.     No PND, orthopnea. Has tried compression stockings but find them difficult to get on. Here with son      PHYSICAL:   Vitals:    01/30/23 1438   BP: 110/60   Pulse: 62      Wt Readings from Last 3 Encounters:   01/30/23 86 kg (189 lb 9.5 oz)   01/20/23 85.5 kg (188 lb 7.9 oz)   01/17/23 83.9 kg (185 lb)       JVD: no   Heart rhythm: irregular  Cardiac murmur: No    S3: no  S4: no  Lungs: clear  Liver span: 10 cm:   Hepatojugular reflux: no  Edema: no      ASSESSMENT: chronic diastolic HF, atrial fibrillation     PLAN:      Patient Instructions:   Instruct the patient to notify this clinic if HH, a physician or an advanced care provider wants to change medication one of their HF medications   Activity and Diet restrictions:   Recommend 2-3 gram sodium restriction and 1500cc- 2000cc fluid restriction.  Encourage physical activity with graded exercise program.  Requested patient to weigh themselves daily, and to notify us if their weight increases by more than 3 lbs in 1 day or 5 lbs in 3 days.    Assigned dry weight on home scale: 83 kg  Medication changes (include current dose and changed dose)  Patient compensated on HF exam, reassured. BNP has trended up  some but with medication sensitivities would not like to diurese too much.   Patient feels weak, wants to be able to get up and walk. Recommend PCP ordering PT/OT   Ok to cut back evening lasix dose to 20 mg but continue am dose of 40 mg. Monitor HF symptoms.   To see EP this week for atrial fibrillation (has stopped Cardizem and amiodarone bc of side effects )  Continue ARB/BB.  Recommend keeping PCP for ongoing issues of weakness, lip cracking, lack of appetite (HF compensated so not contributing to this)

## 2023-01-31 NOTE — PROGRESS NOTES
Subjective:    Patient ID:  Shirley Metz is a 89 y.o. female who presents for follow-up of Atrial Fibrillation      89 yoF referred for AF management.     8/17/22: NSR on recent ECGs up until 6/15/22. AFL and AF on ECGs afterwards starting 7/11/22. Event monitor with 100% AF, controlled average V rate. Normal EF. She had AF after a new BP agent 10/19. Her symptoms are mild. She has tried amiodarone in the past which caused side effects. She has history of CAD and has QTc 450s.     9/22: AF 9/2/22 ECG. She continues to have symptomatic AF as well as symptoms on medication. She wishes to pursue ablation.     Interval history: PVI/CTI 11/3/22. She had upper lip swelling after her PVI, suspected to be due to trauma from the STACEY/ET intubations. She had some complaints about the overnight care. She was in NSR on ECGs up until 1/5/23. 1/10/23 she was in atypical AFL. She takes lasix 40 mg qd and sometimes at night. Her AF episodes are few and far between based on her home BP and HR checks. She still complains of SOB. She has a lasix plan provided by the HF clinic.     Ablation 11/22:  ·  CTI ablation.  ·  Pulmonary vein isolation.  ·  Intracardiac echo.  ·  3D mapping performed with Ensite.    Echo 7/12/22:  · Concentric remodeling and normal systolic function.  · The estimated ejection fraction is 60%.  · Normal left ventricular diastolic function.  · Normal right ventricular size with normal right ventricular systolic function.    Event monitor 7/22:  - Heart rates varied between 41 and 123 BPM with an average of 77 BPM.  - Predominant rhythm: atrial fibrillation   - Atrial Fibrillation (AF) 100.0 %  - PVC 0.48 %    Past Medical History:  11/29/2018: Anemia  11/28/2017: Anxiety  No date: Arthritis  10/9/2019: Atrial fibrillation with RVR  No date: Back pain  03/29/2018: Basal cell carcinoma      Comment:  left mid back  10/15/2019: Chronic diastolic congestive heart failure  8/8/2016: CKD (chronic kidney  disease) stage 3, GFR 30-59 ml/min  No date: Colon polyps      Comment:  reported per patient.   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  No date: General anesthetics causing adverse effect in therapeutic use      Comment:  woke up during surgery and gagged on ET tube  No date: Glaucoma  No date: Glaucoma  7/24/2018: Heart failure with preserved left ventricular function   (HFpEF)  No date: Hyperlipidemia  No date: Hypertension  dry: Macular degeneration  No date: Obesity  4/26/2021: PVD (peripheral vascular disease)  4/26/2021: PVD (peripheral vascular disease)  01/08/2019: Squamous cell carcinoma      Comment:  left shoulder  10/5/2021: Stenosis of carotid artery  No date: Trouble in sleeping  2/24/2021: Type 2 diabetes mellitus without complication, without   long-term current use of insulin  No date: Urinary tract infection    Past Surgical History:  11/3/2022: ABLATION OF ARRHYTHMOGENIC FOCUS FOR ATRIAL FIBRILLATION;   N/A      Comment:  Procedure: Ablation atrial fibrillation;  Surgeon:                Toi Kelly MD;  Location: [facility] LAB;  Service:               Cardiology;  Laterality: N/A;  afib, PVI/CTI, RFA, STACEY                (cx if SR), DEDE, anes, MB, 3 Prep  1938: ADENOIDECTOMY  No date: BREAST BIOPSY; Left      Comment:  1997. benign  1997 approx: BREAST CYST EXCISION; Left  2012 approx: CARPAL TUNNEL RELEASE; Right  1994: CATARACT EXTRACTION; Bilateral  1975: CHOLECYSTECTOMY  08/2015: CORNEAL TRANSPLANT; Bilateral      Comment:  8/2015 - left; Right 4/2016  No date: EYE SURGERY      Comment:  Fuch's Corneal dystrophy - had 2nd operation with Dr. Quintanilla  No date: EYE SURGERY      Comment:  Cataract wIOL  2011: left thumb; Left      Comment:  removed cuboid for arthritis  8/21/2018: REVERSE TOTAL SHOULDER ARTHROPLASTY; Left      Comment:  Procedure: ARTHROPLASTY, SHOULDER, TOTAL, REVERSE;                 Surgeon: Solomon Lujan MD;   Location: [facility] OR;                 Service: Orthopedics;  Laterality: Left;  WITH BICEP                TENODESIS  2017: SKIN CANCER EXCISION; Left      Comment:  BCC removal by Dr. Valadez  05/11/2011: SLT- OS (aka TRABECULOPLASTY); Left      Comment:  repeat 3/14/12  8/21/2018: TENOTOMY; Left      Comment:  Procedure: TENOTOMY;  Surgeon: Solomon Lujan MD;                 Location: [facility] OR;  Service: Orthopedics;  Laterality:                Left;  1938: TONSILLECTOMY  10/10/2019: TREATMENT OF CARDIAC ARRHYTHMIA; N/A      Comment:  Procedure: CARDIOVERSION;  Surgeon: Pete Durán MD;                Location: [facility] CATH LAB;  Service: Cardiology;                 Laterality: N/A;  12/6/2019: TREATMENT OF CARDIAC ARRHYTHMIA; N/A      Comment:  Procedure: CARDIOVERSION;  Surgeon: Samy Castillo MD;                 Location: [facility] CATH LAB;  Service: Cardiology;                 Laterality: N/A;    Social History    Socioeconomic History      Marital status:       Number of children: 3    Tobacco Use      Smoking status: Never      Smokeless tobacco: Never      Tobacco comments: history of passive smoking from her ex-    Substance and Sexual Activity      Alcohol use: Yes        Alcohol/week: 2.0 standard drinks        Types: 2 Standard drinks or equivalent per week        Comment: occ      Drug use: No      Sexual activity: Not Currently        Partners: Male        Birth control/protection: Post-menopausal        Comment: discussed protection for STD's    Other Topics      Concerns:        Are you pregnant or think you may be?: No        Breast-feeding: No    Social History Narrative       x 2,  x 1. Retired artist - previously doing jewelry/wall pieces; ; lives in [facility]; has 3 sons - 52( lives in [place], 54, and 56;exercises minimally - limited due to back issues. Still drives. Does have a Living Will.    Social Determinants of Health  Physical Activity:  Unknown      Days of Exercise per Week: Patient refused      Minutes of Exercise per Session: 10 min  Stress: No Stress Concern Present      Feeling of Stress : Not at all    Review of patient's family history indicates:    Current Outpatient Medications:  acetaminophen (TYLENOL) 500 MG tablet, Take 500 mg by mouth nightly as needed., Disp: , Rfl:   ALPRAZolam (XANAX) 0.5 MG tablet, Take 1 tablet (0.5 mg total) by mouth nightly as needed for Anxiety., Disp: 30 tablet, Rfl: 5  apixaban (ELIQUIS) 2.5 mg Tab, Take 1 tablet (2.5 mg total) by mouth 2 (two) times daily., Disp: 180 tablet, Rfl: 3  b complex vitamins capsule, Take 1 capsule by mouth once daily., Disp: , Rfl:   brinzolamide (AZOPT) 1 % ophthalmic suspension, Place 1 drop into the left eye 2 (two) times a day. (Patient taking differently: Place 1 drop into the left eye every morning.), Disp: 5 mL, Rfl: 12  cholecalciferol, vitamin D3, (VITAMIN D3) 50 mcg (2,000 unit) Cap, Take 1 capsule by mouth once daily., Disp: , Rfl:   coenzyme Q10 200 mg capsule, Take 200 mg by mouth once daily., Disp: , Rfl:    fish oil-omega-3 fatty acids 300-1,000 mg capsule, Take 1 capsule by mouth once daily., Disp: , Rfl:   furosemide (LASIX) 40 MG tablet, Take 1 tablet (40 mg total) by mouth 2 (two) times a day., Disp: 30 tablet, Rfl: 0  losartan (COZAAR) 25 MG tablet, Take 1 tablet (25 mg total) by mouth once daily., Disp: 180 tablet, Rfl: 1  metoprolol tartrate (LOPRESSOR) 25 MG tablet, Take 1 tablet (25 mg total) by mouth 2 (two) times daily., Disp: 60 tablet, Rfl: 11  POLYETHYLENE GLYCOL 3350 (MIRALAX ORAL), Take 17 g by mouth 2 (two) times a day. Taking BID, Disp: , Rfl:   RHOPRESSA 0.02 % ophthalmic solution, PLACE 1 DROP INTO THE LEFT EYE EVERY EVENING., Disp: 2.5 mL, Rfl: 11  TRAVATAN Z 0.004 % ophthalmic solution, Place 1 drop into the left eye every evening., Disp: 2.5 mL, Rfl: 12    No current facility-administered medications for this visit.  Facility-Administered  Medications Ordered in Other Visits:  sodium chloride 0.9% flush 3 mL, 3 mL, Intravenous, PRN, Steve Galarza PA-C  vancomycin in dextrose 5 % 1 gram/250 mL IVPB 1,000 mg, 1,000 mg, Intravenous, On Call Procedure, Vianney Fountain NP      Review of Systems   Constitutional: Negative. Negative for diaphoresis, malaise/fatigue and weight gain.   HENT: Negative.  Negative for hoarse voice.    Eyes: Negative.  Negative for double vision and visual disturbance.   Cardiovascular:  Negative for chest pain, claudication, cyanosis, dyspnea on exertion, irregular heartbeat, leg swelling, near-syncope, orthopnea, palpitations, paroxysmal nocturnal dyspnea and syncope.   Respiratory: Negative.  Negative for cough, shortness of breath and snoring.    Endocrine: Negative.    Hematologic/Lymphatic: Negative.  Negative for bleeding problem. Does not bruise/bleed easily.   Skin: Negative.  Negative for color change and poor wound healing.   Musculoskeletal: Negative.  Negative for muscle cramps, muscle weakness and myalgias.   Gastrointestinal: Negative.  Negative for bloating, abdominal pain, change in bowel habit, diarrhea, heartburn, hematemesis, hematochezia, melena and nausea.   Genitourinary: Negative.    Neurological: Negative.  Negative for excessive daytime sleepiness, dizziness, headaches, light-headedness, loss of balance, numbness and weakness.        HAS HOTTNESS IN BOTH FEET AND ARMS AND HANDS.    Psychiatric/Behavioral: Negative.  Negative for memory loss. The patient does not have insomnia.    Allergic/Immunologic: Negative.  Negative for hives.      Objective:    Physical Exam  Vitals and nursing note reviewed.   Constitutional:       General: She is not in acute distress.     Appearance: She is well-developed. She is not diaphoretic.   HENT:      Head: Normocephalic and atraumatic.      Nose: Nose normal.   Eyes:      General: No scleral icterus.     Conjunctiva/sclera: Conjunctivae normal.   Neck:       Thyroid: No thyromegaly.      Vascular: No JVD.   Cardiovascular:      Rate and Rhythm: Normal rate. Rhythm irregular.      Heart sounds: S1 normal and S2 normal. No murmur heard.    No friction rub. No gallop. No S3 or S4 sounds.   Pulmonary:      Effort: Pulmonary effort is normal. No respiratory distress.      Breath sounds: Normal breath sounds. No stridor. No wheezing or rales.   Chest:      Chest wall: No tenderness.   Abdominal:      General: Bowel sounds are normal. There is no distension.      Palpations: Abdomen is soft. There is no mass.      Tenderness: There is no abdominal tenderness. There is no rebound.   Genitourinary:     Comments: Deferred  Musculoskeletal:         General: No tenderness or deformity. Normal range of motion.      Cervical back: Normal range of motion and neck supple.   Lymphadenopathy:      Cervical: No cervical adenopathy.   Skin:     General: Skin is warm and dry.      Coloration: Skin is not pale.      Findings: No erythema or rash.   Neurological:      Mental Status: She is alert and oriented to person, place, and time.      Motor: No abnormal muscle tone.      Coordination: Coordination normal.   Psychiatric:         Behavior: Behavior normal.         Thought Content: Thought content normal.         Judgment: Judgment normal.         Assessment:       1. Shortness of breath    2. Paroxysmal atrial fibrillation         Plan:       89 yoF here for follow up after her ablation. She is in normal rhythm most of the time by her account but continues to have SOB, which is her main complaint. Will get a 1w monitor to see if she has more arrhythmia than she feels and that if she has a symptom/rhythm correlation.     1w elayne

## 2023-02-01 ENCOUNTER — OFFICE VISIT (OUTPATIENT)
Dept: CARDIOLOGY | Facility: CLINIC | Age: 88
End: 2023-02-01
Payer: MEDICARE

## 2023-02-01 VITALS
HEART RATE: 62 BPM | WEIGHT: 186.75 LBS | BODY MASS INDEX: 31.88 KG/M2 | DIASTOLIC BLOOD PRESSURE: 68 MMHG | SYSTOLIC BLOOD PRESSURE: 126 MMHG | HEIGHT: 64 IN

## 2023-02-01 DIAGNOSIS — I48.0 PAROXYSMAL ATRIAL FIBRILLATION: ICD-10-CM

## 2023-02-01 DIAGNOSIS — R06.02 SHORTNESS OF BREATH: Primary | ICD-10-CM

## 2023-02-01 PROCEDURE — 3288F PR FALLS RISK ASSESSMENT DOCUMENTED: ICD-10-PCS | Mod: HCNC,CPTII,S$GLB, | Performed by: INTERNAL MEDICINE

## 2023-02-01 PROCEDURE — 1125F AMNT PAIN NOTED PAIN PRSNT: CPT | Mod: HCNC,CPTII,S$GLB, | Performed by: INTERNAL MEDICINE

## 2023-02-01 PROCEDURE — 3288F FALL RISK ASSESSMENT DOCD: CPT | Mod: HCNC,CPTII,S$GLB, | Performed by: INTERNAL MEDICINE

## 2023-02-01 PROCEDURE — 1101F PT FALLS ASSESS-DOCD LE1/YR: CPT | Mod: HCNC,CPTII,S$GLB, | Performed by: INTERNAL MEDICINE

## 2023-02-01 PROCEDURE — 1159F MED LIST DOCD IN RCRD: CPT | Mod: HCNC,CPTII,S$GLB, | Performed by: INTERNAL MEDICINE

## 2023-02-01 PROCEDURE — 99999 PR PBB SHADOW E&M-EST. PATIENT-LVL IV: CPT | Mod: PBBFAC,HCNC,, | Performed by: INTERNAL MEDICINE

## 2023-02-01 PROCEDURE — 99213 OFFICE O/P EST LOW 20 MIN: CPT | Mod: HCNC,S$GLB,, | Performed by: INTERNAL MEDICINE

## 2023-02-01 PROCEDURE — 1101F PR PT FALLS ASSESS DOC 0-1 FALLS W/OUT INJ PAST YR: ICD-10-PCS | Mod: HCNC,CPTII,S$GLB, | Performed by: INTERNAL MEDICINE

## 2023-02-01 PROCEDURE — 99213 PR OFFICE/OUTPT VISIT, EST, LEVL III, 20-29 MIN: ICD-10-PCS | Mod: HCNC,S$GLB,, | Performed by: INTERNAL MEDICINE

## 2023-02-01 PROCEDURE — 1159F PR MEDICATION LIST DOCUMENTED IN MEDICAL RECORD: ICD-10-PCS | Mod: HCNC,CPTII,S$GLB, | Performed by: INTERNAL MEDICINE

## 2023-02-01 PROCEDURE — 99999 PR PBB SHADOW E&M-EST. PATIENT-LVL IV: ICD-10-PCS | Mod: PBBFAC,HCNC,, | Performed by: INTERNAL MEDICINE

## 2023-02-01 PROCEDURE — 1125F PR PAIN SEVERITY QUANTIFIED, PAIN PRESENT: ICD-10-PCS | Mod: HCNC,CPTII,S$GLB, | Performed by: INTERNAL MEDICINE

## 2023-02-02 NOTE — PROGRESS NOTES
"Shirley Metz  02/06/2023  [id number]    Phylicia Deleon MD  Patient Care Team:  Phylicia Deleon MD as PCP - General (Internal Medicine)  Wilbert Ware MD as Consulting Physician (Ophthalmology)  Rajinder Quintanilla MD as Consulting Physician (Ophthalmology)  Suzan Gregorio PA-C as Physician Assistant (Rheumatology)  Rosario Valadez MD as Consulting Physician (Dermatology)  Trevon Ramirez MD as Consulting Physician (Cardiology)  Amish Mendoza MD (Pulmonary Disease)  Jaquan Choi MD as Consulting Physician (Vascular Surgery)  Debbie Choi MD (Inactive) as Consulting Physician (Gynecology)  Emmanuel Fish MD as Consulting Physician (Hand Surgery)  PAXTON Chaidez Jr., MD as Consulting Physician (Orthopedic Surgery)  Phylicia Deleon MD as Physician (Internal Medicine)  Heather Miller NP as Nurse Practitioner (Family Medicine)      [facility] Primary Care Note      Chief Complaint:  Chief Complaint   Patient presents with    1 week follow up       History of Present Illness:  HPI    Still significant RUIZ. Onset during last hospitalization. No improvement/not worse. Sleeps with 2 pillows, this is baseline for eye problems. No significant RUIZ at night or in AM.     Home -135, HR "good." Now believes she is rarely in Afib based on rhythm while checking BP/HR.     No significant cough/resp congestion. Some nasal congestion at night.     Pain in BLE muscles, radiates from shoulders. Worse with movement.     Taking furosemide 40 mg BID. Some increased afternoon edema that is new. Now not relieved by furosemide. Wearing compression socks that provides some relief.     Has seen Dr Ya 4/2021 for PAD, asymptomatic then. Recommended conservative mgmt with ASA/Eliquis and Repatha.   Pt declined Repatha.     Home weights stable at 180 lbs.     Poor appetite. This is new for pt. Feels full. Remote EGD, normal per pt. Some belching during swallowing attempts.     Last stress test " 5/2017, normal. Would not want to repeat. Impaired renal fx.        Review of Systems   Constitutional:  Negative for fever and weight loss.   Eyes:  Negative for blurred vision.   Respiratory:  Positive for shortness of breath. Negative for cough and wheezing.    Cardiovascular:  Positive for leg swelling. Negative for chest pain.   Gastrointestinal:  Negative for abdominal pain, nausea and vomiting.   Genitourinary:  Negative for dysuria.   Musculoskeletal:  Positive for myalgias. Negative for falls.   Neurological:  Negative for dizziness.       The following were reviewed: Active problem list, medication list, allergies, family history, social history, and Health Maintenance.     History:  Past Medical History:   Diagnosis Date    Anemia 11/29/2018    Anxiety 11/28/2017    Arthritis     Atrial fibrillation with RVR 10/9/2019    Back pain     Basal cell carcinoma 03/29/2018    left mid back    Chronic diastolic congestive heart failure 10/15/2019    CKD (chronic kidney disease) stage 3, GFR 30-59 ml/min 8/8/2016    Colon polyps     reported per patient.     Coronary artery calcification 10/5/2021    Fuchs' corneal dystrophy     General anesthetics causing adverse effect in therapeutic use     woke up during surgery and gagged on ET tube    Glaucoma     Glaucoma     Heart failure with preserved left ventricular function (HFpEF) 7/24/2018    Hyperlipidemia     Hypertension     Macular degeneration dry    Obesity     PVD (peripheral vascular disease) 4/26/2021    PVD (peripheral vascular disease) 4/26/2021    Squamous cell carcinoma 01/08/2019    left shoulder    Stenosis of carotid artery 10/5/2021    Trouble in sleeping     Type 2 diabetes mellitus without complication, without long-term current use of insulin 2/24/2021    Urinary tract infection      Past Surgical History:   Procedure Laterality Date    ABLATION OF ARRHYTHMOGENIC FOCUS FOR ATRIAL FIBRILLATION N/A 11/3/2022    Procedure: Ablation atrial  fibrillation;  Surgeon: Toi Kelly MD;  Location: [facility] EP LAB;  Service: Cardiology;  Laterality: N/A;  afib, PVI/CTI, RFA, STACEY (cx if SR), DEDE, ABDIEL orantes, 3 Prep    ADENOIDECTOMY  1938    BREAST BIOPSY Left     1997. benign    BREAST CYST EXCISION Left 1997 approx    CARPAL TUNNEL RELEASE Right 2012 approx    CATARACT EXTRACTION Bilateral 1994    CHOLECYSTECTOMY  1975    CORNEAL TRANSPLANT Bilateral 08/2015 8/2015 - left; Right 4/2016    EYE SURGERY      Fuch's Corneal dystrophy - had 2nd operation with Dr. Quintanilla    EYE SURGERY      Cataract wIOL    left thumb Left 2011    removed cuboid for arthritis    REVERSE TOTAL SHOULDER ARTHROPLASTY Left 8/21/2018    Procedure: ARTHROPLASTY, SHOULDER, TOTAL, REVERSE;  Surgeon: Solomon Lujan MD;  Location: [facility] OR;  Service: Orthopedics;  Laterality: Left;  WITH BICEP TENODESIS    SKIN CANCER EXCISION Left 2017    BCC removal by Dr. Valadez    SLT- OS (aka TRABECULOPLASTY) Left 05/11/2011    repeat 3/14/12    TENOTOMY Left 8/21/2018    Procedure: TENOTOMY;  Surgeon: Solomon Lujan MD;  Location: [facility] OR;  Service: Orthopedics;  Laterality: Left;    TONSILLECTOMY  1938    TREATMENT OF CARDIAC ARRHYTHMIA N/A 10/10/2019    Procedure: CARDIOVERSION;  Surgeon: Pete Durán MD;  Location: [facility] CATH LAB;  Service: Cardiology;  Laterality: N/A;    TREATMENT OF CARDIAC ARRHYTHMIA N/A 12/6/2019    Procedure: CARDIOVERSION;  Surgeon: Samy Castillo MD;  Location: [facility] CATH LAB;  Service: Cardiology;  Laterality: N/A;     Family History   Problem Relation Age of Onset    Heart disease Mother     Hypertension Mother     Cancer Father 88        sarcoma( ?Kaposi's ) on leg    Deep vein thrombosis Neg Hx     Ovarian cancer Neg Hx     Breast cancer Neg Hx     Kidney disease Neg Hx     Strabismus Neg Hx     Retinal detachment Neg Hx     Macular degeneration Neg Hx     Glaucoma Neg Hx     Blindness Neg Hx     Amblyopia Neg Hx     Eczema Neg Hx     Lupus Neg Hx     Melanoma  Neg Hx     Psoriasis Neg Hx     Diabetes Neg Hx     Stroke Neg Hx     Mental retardation Neg Hx     Mental illness Neg Hx     Hyperlipidemia Neg Hx     COPD Neg Hx     Asthma Neg Hx     Depression Neg Hx     Alcohol abuse Neg Hx     Drug abuse Neg Hx      Social History     Socioeconomic History    Marital status:     Number of children: 3   Tobacco Use    Smoking status: Never    Smokeless tobacco: Never    Tobacco comments:     history of passive smoking from her ex-   Substance and Sexual Activity    Alcohol use: Yes     Alcohol/week: 2.0 standard drinks     Types: 2 Standard drinks or equivalent per week     Comment: occ    Drug use: No    Sexual activity: Not Currently     Partners: Male     Birth control/protection: Post-menopausal     Comment: discussed protection for STD's   Other Topics Concern    Are you pregnant or think you may be? No    Breast-feeding No   Social History Narrative     x 2,  x 1. Retired artist - previously doing jewelry/wall pieces; ; lives in [facility]; has 3 sons - 52( lives in [place], 54, and 56;exercises minimally - limited due to back issues. Still drives. Does have a Living Will.     Social Determinants of Health     Physical Activity: Unknown    Days of Exercise per Week: Patient refused    Minutes of Exercise per Session: 10 min   Stress: No Stress Concern Present    Feeling of Stress : Not at all     Patient Active Problem List   Diagnosis    HTN (hypertension)    Mixed hyperlipidemia    Primary osteoarthritis involving multiple joints    Macular degeneration - Both Eyes    Fuch's endothelial dystrophy - Both Eyes    Lens replaced by other means    COAG (chronic open-angle glaucoma) - Both Eyes    Blepharitis, unspecified - Both Eyes    Shortness of breath    Abnormal ECG    PVC's (premature ventricular contractions)    Other microscopic hematuria    LVH (left ventricular hypertrophy) due to hypertensive disease     Osteopenia of hip    Myalgia    Calcification of aorta    Neuropathy    Unequal blood pressures in paired extremities    Medication side effects    History of cornea transplant    Pseudophakia    Insomnia    Anxiety    Pre-diabetes    Obesity (BMI 30.0-34.9)    Elevated troponin    Bilateral shoulder region arthritis    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    Anemia    New onset a-fib    Thrombus of left atrial appendage    Paroxysmal atrial fibrillation    Elevated liver enzymes    Atrial flutter with rapid ventricular response    Chronic anticoagulation    Keratitis sicca, bilateral    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    PAD (peripheral artery disease)    Statin intolerance    Pulmonary nodules/lesions, multiple    Spinal stenosis at L4-L5 level    Pulmonary granuloma    Lumbar spondylosis    Bilateral recurrent inguinal hernia without obstruction or gangrene    LLQ abdominal pain    Pain in left leg    Rectal abnormality    Stenosis of carotid artery    Carotid artery calcification, bilateral    B12 deficiency    Seasonal allergic rhinitis    Atrial fibrillation with RVR    Elevated serum creatinine    Coagulopathy    Atheroma of artery    Acute on chronic diastolic congestive heart failure    Troponin level elevated     Review of patient's allergies indicates:   Allergen Reactions    Carvedilol Swelling     Lip swelling    Cephalexin Other (See Comments)     Muscle/joint weakness unable to move     Doxycycline Shortness Of Breath, Nausea And Vomiting and Other (See Comments)     weakness    Erythromycin Other (See Comments)     Complete weakness/could not walk    Gadolinium-containing contrast media Swelling     angioedema    Hydrocodone-acetaminophen Shortness Of Breath     wheezing    Inveltys [loteprednol etabonate] Palpitations and Other (See Comments)     Patient stated bp went up.    Labetalol Shortness Of Breath, Nausea Only, Palpitations and  Other (See Comments)     weakness    Loratadine Other (See Comments)     Double vision/spots  Other reaction(s): double vision    Naproxen      wheezing  wheezing  wheezing  Other reaction(s): wheezing    Statins-hmg-coa reductase inhibitors Other (See Comments)     Severe Muscle weakness  Muscle weakness  Severe Muscle weakness  Other reaction(s): severe muscle weakness    Amlodipine Other (See Comments)     Myalgias    Fenofibrate Other (See Comments)     muscle weakness      Hydralazine Other (See Comments)     muscle tremors    Hydralazine analogues Other (See Comments)     Muscle tremors/aches      Loteprednol Other (See Comments)     increased BP    Macrolide antibiotics Other (See Comments)     Complete weakness/could not walk      Moxifloxacin Hives and Other (See Comments)     muscle soreness    Timolol Other (See Comments)     Blurred vision, Burning eyes, Severe muscle weakness, eye problems.      Verapamil Diarrhea     Severe diarrhea.      Hydrocortisone     Isosorbide      Tolerates Imdur    Tetanus and diphtheria toxoids     Adhesive Rash     Clonidine patch--contact dermatitis    Codeine Diarrhea and Other (See Comments)     Loss of balance       Doxazosin Palpitations    Fexofenadine Other (See Comments)     Double vision/spots       Hydrocortisone (bulk) Other (See Comments)     Only suppository/ caused muscle weakness    Latanoprost Other (See Comments)     Muscle weakness      Olopatadine Other (See Comments)     Muscle weakness      Prednisone Anxiety       Medications:  Current Outpatient Medications on File Prior to Visit   Medication Sig Dispense Refill    acetaminophen (TYLENOL) 500 MG tablet Take 500 mg by mouth nightly as needed.      ALPRAZolam (XANAX) 0.5 MG tablet Take 1 tablet (0.5 mg total) by mouth nightly as needed for Anxiety. 30 tablet 5    apixaban (ELIQUIS) 2.5 mg Tab Take 1 tablet (2.5 mg total) by mouth 2 (two) times daily. 180 tablet 3    b complex vitamins capsule Take 1  capsule by mouth once daily.      brinzolamide (AZOPT) 1 % ophthalmic suspension Place 1 drop into the left eye 2 (two) times a day. (Patient taking differently: Place 1 drop into the left eye every morning.) 5 mL 12    cholecalciferol, vitamin D3, (VITAMIN D3) 50 mcg (2,000 unit) Cap Take 1 capsule by mouth once daily.      coenzyme Q10 200 mg capsule Take 200 mg by mouth once daily.      fish oil-omega-3 fatty acids 300-1,000 mg capsule Take 1 capsule by mouth once daily.      furosemide (LASIX) 40 MG tablet Take 1 tablet (40 mg total) by mouth 2 (two) times a day. 30 tablet 0    losartan (COZAAR) 25 MG tablet Take 1 tablet (25 mg total) by mouth once daily. 180 tablet 1    metoprolol tartrate (LOPRESSOR) 25 MG tablet Take 1 tablet (25 mg total) by mouth 2 (two) times daily. 60 tablet 11    POLYETHYLENE GLYCOL 3350 (MIRALAX ORAL) Take 17 g by mouth 2 (two) times a day. Taking BID      RHOPRESSA 0.02 % ophthalmic solution PLACE 1 DROP INTO THE LEFT EYE EVERY EVENING. 2.5 mL 11    TRAVATAN Z 0.004 % ophthalmic solution Place 1 drop into the left eye every evening. 2.5 mL 12    [DISCONTINUED] cloNIDine (CATAPRES) 0.1 MG tablet Take 1 tablet (0.1 mg total) by mouth 2 (two) times daily as needed (systolic BP over 180). 20 tablet 1    [DISCONTINUED] isosorbide mononitrate (IMDUR) 30 MG 24 hr tablet Take 1 tablet (30 mg total) by mouth every evening. 90 tablet 1     Current Facility-Administered Medications on File Prior to Visit   Medication Dose Route Frequency Provider Last Rate Last Admin    sodium chloride 0.9% flush 3 mL  3 mL Intravenous PRN Steve Galarza PA-C        vancomycin in dextrose 5 % 1 gram/250 mL IVPB 1,000 mg  1,000 mg Intravenous On Call Procedure Vianney Fountain NP           Medications have been reviewed and reconciled with patient at visit today.    Barriers to medications present (no )    Fall since last office visit (no )      Exam:  Vitals:    02/03/23 0930   BP: (!) 119/56   Pulse:  68   Temp: 97.6 °F (36.4 °C)     Weight: 84.6 kg (186 lb 9.6 oz)   Body mass index is 32.03 kg/m².      BP Readings from Last 3 Encounters:   02/03/23 (!) 119/56   02/01/23 126/68   01/30/23 110/60     Wt Readings from Last 3 Encounters:   02/03/23 0930 84.6 kg (186 lb 9.6 oz)   02/01/23 0939 84.7 kg (186 lb 11.7 oz)   01/30/23 1438 86 kg (189 lb 9.5 oz)            Physical Exam  Constitutional:       General: She is not in acute distress.  HENT:      Head: Normocephalic.      Nose: Nose normal.      Mouth/Throat:      Mouth: Mucous membranes are moist.   Eyes:      General: No scleral icterus.  Cardiovascular:      Rate and Rhythm: Normal rate. Rhythm irregular.   Pulmonary:      Effort: Pulmonary effort is normal.      Breath sounds: Normal breath sounds.   Abdominal:      General: There is no distension.      Palpations: Abdomen is soft.   Musculoskeletal:      Cervical back: Neck supple.      Right lower leg: Edema present.      Left lower leg: Edema present.      Comments: 2+ bilateral pretibial edema.    Lymphadenopathy:      Cervical: No cervical adenopathy.   Skin:     General: Skin is warm and dry.   Neurological:      Mental Status: She is alert. Mental status is at baseline.   Psychiatric:         Mood and Affect: Mood normal.         Behavior: Behavior normal.       Laboratory Reviewed: (Yes)  Lab Results   Component Value Date    WBC 6.63 01/12/2023    HGB 11.5 (L) 01/12/2023    HCT 35.6 (L) 01/12/2023     01/12/2023    CHOL 168 08/31/2022    TRIG 98 08/31/2022    HDL 43 08/31/2022    ALT 16 01/12/2023    AST 21 01/12/2023     01/30/2023    K 4.0 01/30/2023     01/30/2023    CREATININE 2.3 (H) 01/30/2023    BUN 47 (H) 01/30/2023    CO2 26 01/30/2023    TSH 3.826 08/31/2022    INR 1.1 10/25/2022    HGBA1C 6.2 (H) 08/31/2022           Health Maintenance  Health Maintenance Topics with due status: Not Due       Topic Last Completion Date    Pneumococcal Vaccines (Age 65+) 03/16/2022     Hemoglobin A1c (Prediabetes) 08/31/2022    Lipid Panel 08/31/2022     Health Maintenance Due   Topic Date Due    Shingles Vaccine (1 of 2) Never done    TETANUS VACCINE  11/09/2021    COVID-19 Vaccine (4 - Booster for Pfizer series) 11/24/2021       Assessment:  Problem List Items Addressed This Visit          Cardiac/Vascular    HTN (hypertension) (Chronic)    Paroxysmal atrial fibrillation     Rate controlled with metoprolol. Continues apixaban. Followed closely by cardiology.         PAD (peripheral artery disease)     Continues apixaban. Intolerant of statins, declines Repatha. Suspect edema is worsening PAD sx. Add metolazone x 3 days.          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 CXR.  No weight gain but PO intake significantly decreased. Suspect edema is worsening PAD sx. Will add metolazone x 3 days in addition to furosemide.               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   Repeat RFP in 2 weeks.           Other Visit Diagnoses       RUIZ (dyspnea on exertion)    -  Primary    Relevant Medications    metOLazone (ZAROXOLYN) 2.5 MG tablet    Other Relevant Orders    X-Ray Chest PA And Lateral              Plan:  RUIZ (dyspnea on exertion)  -     Cancel: D-DIMER, QUANTITATIVE; Future; Expected date: 02/03/2023  -     X-Ray Chest PA And Lateral  -     metOLazone (ZAROXOLYN) 2.5 MG tablet; Take 1 tablet (2.5 mg total) by mouth once daily. 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 Patient expressed understanding. Patient was  given the opportunity to ask questions and be an active participant in their medical care. Patient had no further questions or concerns at this time.   -Documentation of patient's health and condition was obtained from family member who was present during visit.  -Patient is an overall moderate risk for health complications from their medical conditions.       Follow up: Follow up in about 2 weeks (around 2/17/2023).      After visit summary printed and given to patient upon discharge.  Patient goals and care plan are included in After visit summary.    Total medical decision making time was 52 min.  The following issues were discussed: The primary encounter diagnosis was RUIZ (dyspnea on exertion). 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    Health maintenance needs, recent test results and goals of care discussed with pt and questions answered.

## 2023-02-03 ENCOUNTER — OFFICE VISIT (OUTPATIENT)
Dept: PRIMARY CARE CLINIC | Facility: CLINIC | Age: 88
End: 2023-02-03
Payer: MEDICARE

## 2023-02-03 VITALS
WEIGHT: 186.63 LBS | HEIGHT: 64 IN | TEMPERATURE: 98 F | HEART RATE: 68 BPM | BODY MASS INDEX: 31.86 KG/M2 | DIASTOLIC BLOOD PRESSURE: 56 MMHG | OXYGEN SATURATION: 95 % | SYSTOLIC BLOOD PRESSURE: 119 MMHG

## 2023-02-03 DIAGNOSIS — N19 HYPERTENSIVE HEART AND RENAL DISEASE WITH BOTH (CONGESTIVE) HEART FAILURE AND RENAL FAILURE: ICD-10-CM

## 2023-02-03 DIAGNOSIS — I12.9 CKD STAGE 4 SECONDARY TO HYPERTENSION: ICD-10-CM

## 2023-02-03 DIAGNOSIS — I48.0 PAROXYSMAL ATRIAL FIBRILLATION: ICD-10-CM

## 2023-02-03 DIAGNOSIS — I10 PRIMARY HYPERTENSION: Chronic | ICD-10-CM

## 2023-02-03 DIAGNOSIS — N18.4 CKD STAGE 4 SECONDARY TO HYPERTENSION: ICD-10-CM

## 2023-02-03 DIAGNOSIS — I13.2 HYPERTENSIVE HEART AND RENAL DISEASE WITH BOTH (CONGESTIVE) HEART FAILURE AND RENAL FAILURE: ICD-10-CM

## 2023-02-03 DIAGNOSIS — R06.09 DOE (DYSPNEA ON EXERTION): Primary | ICD-10-CM

## 2023-02-03 DIAGNOSIS — I73.9 PAD (PERIPHERAL ARTERY DISEASE): ICD-10-CM

## 2023-02-03 PROCEDURE — 3288F FALL RISK ASSESSMENT DOCD: CPT | Mod: HCNC,CPTII,S$GLB, | Performed by: NURSE PRACTITIONER

## 2023-02-03 PROCEDURE — 99215 OFFICE O/P EST HI 40 MIN: CPT | Mod: HCNC,S$GLB,, | Performed by: NURSE PRACTITIONER

## 2023-02-03 PROCEDURE — 1125F AMNT PAIN NOTED PAIN PRSNT: CPT | Mod: HCNC,CPTII,S$GLB, | Performed by: NURSE PRACTITIONER

## 2023-02-03 PROCEDURE — 1159F MED LIST DOCD IN RCRD: CPT | Mod: HCNC,CPTII,S$GLB, | Performed by: NURSE PRACTITIONER

## 2023-02-03 PROCEDURE — 1160F RVW MEDS BY RX/DR IN RCRD: CPT | Mod: HCNC,CPTII,S$GLB, | Performed by: NURSE PRACTITIONER

## 2023-02-03 PROCEDURE — 99999 PR PBB SHADOW E&M-EST. PATIENT-LVL IV: CPT | Mod: PBBFAC,HCNC,, | Performed by: NURSE PRACTITIONER

## 2023-02-03 PROCEDURE — 1101F PR PT FALLS ASSESS DOC 0-1 FALLS W/OUT INJ PAST YR: ICD-10-PCS | Mod: HCNC,CPTII,S$GLB, | Performed by: NURSE PRACTITIONER

## 2023-02-03 PROCEDURE — 1101F PT FALLS ASSESS-DOCD LE1/YR: CPT | Mod: HCNC,CPTII,S$GLB, | Performed by: NURSE PRACTITIONER

## 2023-02-03 PROCEDURE — 99999 PR PBB SHADOW E&M-EST. PATIENT-LVL IV: ICD-10-PCS | Mod: PBBFAC,HCNC,, | Performed by: NURSE PRACTITIONER

## 2023-02-03 PROCEDURE — 1125F PR PAIN SEVERITY QUANTIFIED, PAIN PRESENT: ICD-10-PCS | Mod: HCNC,CPTII,S$GLB, | Performed by: NURSE PRACTITIONER

## 2023-02-03 PROCEDURE — 99215 PR OFFICE/OUTPT VISIT, EST, LEVL V, 40-54 MIN: ICD-10-PCS | Mod: HCNC,S$GLB,, | Performed by: NURSE PRACTITIONER

## 2023-02-03 PROCEDURE — 1159F PR MEDICATION LIST DOCUMENTED IN MEDICAL RECORD: ICD-10-PCS | Mod: HCNC,CPTII,S$GLB, | Performed by: NURSE PRACTITIONER

## 2023-02-03 PROCEDURE — 3288F PR FALLS RISK ASSESSMENT DOCUMENTED: ICD-10-PCS | Mod: HCNC,CPTII,S$GLB, | Performed by: NURSE PRACTITIONER

## 2023-02-03 PROCEDURE — 1160F PR REVIEW ALL MEDS BY PRESCRIBER/CLIN PHARMACIST DOCUMENTED: ICD-10-PCS | Mod: HCNC,CPTII,S$GLB, | Performed by: NURSE PRACTITIONER

## 2023-02-03 RX ORDER — METOLAZONE 2.5 MG/1
2.5 TABLET ORAL DAILY
Qty: 3 TABLET | Refills: 0 | Status: SHIPPED | OUTPATIENT
Start: 2023-02-03 | End: 2023-02-21 | Stop reason: SDUPTHER

## 2023-02-06 ENCOUNTER — TELEPHONE (OUTPATIENT)
Dept: PRIMARY CARE CLINIC | Facility: CLINIC | Age: 88
End: 2023-02-06
Payer: MEDICARE

## 2023-02-06 NOTE — TELEPHONE ENCOUNTER
Patient called in stating she was returning your call. She was informed that you were with a patient. She asked if you could give her a call back?

## 2023-02-06 NOTE — ASSESSMENT & PLAN NOTE
2+ BLE edema with RUIZ. CXR.  No weight gain but PO intake significantly decreased. Suspect edema is worsening PAD sx. Will add metolazone x 3 days in addition to furosemide.

## 2023-02-06 NOTE — ASSESSMENT & PLAN NOTE
Continues apixaban. Intolerant of statins, declines Repatha. Suspect edema is worsening PAD sx. Add metolazone x 3 days.

## 2023-02-06 NOTE — TELEPHONE ENCOUNTER
Still BLE edema in PM, improved in AM. Slight improvement in dyspnea. Occasional mild cough. Dry mouth. Still some BLE pain relieved by OTCs. Has not had CXR.     Still some leg pain, BLEs. Worse with ambulation. Also pain throughout her entire body.

## 2023-02-07 DIAGNOSIS — Z00.00 ENCOUNTER FOR MEDICARE ANNUAL WELLNESS EXAM: ICD-10-CM

## 2023-02-08 ENCOUNTER — HOSPITAL ENCOUNTER (OUTPATIENT)
Dept: RADIOLOGY | Facility: HOSPITAL | Age: 88
Discharge: HOME | End: 2023-02-08
Attending: NURSE PRACTITIONER
Payer: MEDICARE

## 2023-02-08 ENCOUNTER — HOSPITAL ENCOUNTER (OUTPATIENT)
Dept: CARDIOLOGY | Facility: HOSPITAL | Age: 88
Discharge: HOME | End: 2023-02-08
Attending: INTERNAL MEDICINE
Payer: MEDICARE

## 2023-02-08 ENCOUNTER — TELEPHONE (OUTPATIENT)
Dept: CARDIOLOGY | Facility: CLINIC | Age: 88
End: 2023-02-08
Payer: MEDICARE

## 2023-02-08 DIAGNOSIS — I48.0 PAROXYSMAL ATRIAL FIBRILLATION: ICD-10-CM

## 2023-02-08 PROCEDURE — 93244 CV CARDIAC MONITOR - 3-15 DAY ADULT (CUPID ONLY): ICD-10-PCS | Mod: ,,, | Performed by: INTERNAL MEDICINE

## 2023-02-08 PROCEDURE — 71046 X-RAY EXAM CHEST 2 VIEWS: CPT | Mod: 26,HCNC,, | Performed by: RADIOLOGY

## 2023-02-08 PROCEDURE — 71046 X-RAY EXAM CHEST 2 VIEWS: CPT | Mod: TC,HCNC

## 2023-02-08 PROCEDURE — 93244 EXT ECG>48HR<7D REV&INTERPJ: CPT | Mod: ,,, | Performed by: INTERNAL MEDICINE

## 2023-02-08 PROCEDURE — 71046 XR CHEST PA AND LATERAL: ICD-10-PCS | Mod: 26,HCNC,, | Performed by: RADIOLOGY

## 2023-02-08 NOTE — TELEPHONE ENCOUNTER
Pt called to let us know her pcp gave metolazone 2.5mg x 3 days. After taking her wt is down 4lbs.   Also repeated cxr if you wanted to look at it.   Nothing further needed.   I let her know I would update the provider of her progress.

## 2023-02-09 ENCOUNTER — PATIENT MESSAGE (OUTPATIENT)
Dept: PRIMARY CARE CLINIC | Facility: CLINIC | Age: 88
End: 2023-02-09
Payer: MEDICARE

## 2023-02-09 DIAGNOSIS — Z00.00 ENCOUNTER FOR MEDICARE ANNUAL WELLNESS EXAM: ICD-10-CM

## 2023-02-10 ENCOUNTER — PATIENT MESSAGE (OUTPATIENT)
Dept: PRIMARY CARE CLINIC | Facility: CLINIC | Age: 88
End: 2023-02-10
Payer: MEDICARE

## 2023-02-13 ENCOUNTER — TELEPHONE (OUTPATIENT)
Dept: CARDIOLOGY | Facility: CLINIC | Age: 88
End: 2023-02-13
Payer: MEDICARE

## 2023-02-13 NOTE — TELEPHONE ENCOUNTER
Received call and spoke with pt.   Pt informed did not take Bp medications, losartan and lopressor yesterday due to BP low=95/49, HR=86.    Pt denies dizziness or lightheadedness with above BP.  Pt also denies CP, SOB, PND, orthopnea, or edema.     Pt informed took all medications today.   Vitals today: 139/88, 91    Informed pt ok to take all medications with above BP readings since asymptomatic. Informed pt to notify provider if becomes symptomatic ( dizzy or lightheaded) or any questions.      Informed pt will notify provider of above information and return call if any further recommendations.    Pt has follow up appt with Dr. Kelly on 3/2023.

## 2023-02-15 ENCOUNTER — IMMUNIZATION (OUTPATIENT)
Dept: PHARMACY | Facility: CLINIC | Age: 88
End: 2023-02-15
Payer: MEDICARE

## 2023-02-15 DIAGNOSIS — Z23 NEED FOR VACCINATION: Primary | ICD-10-CM

## 2023-02-21 ENCOUNTER — TELEPHONE (OUTPATIENT)
Dept: PRIMARY CARE CLINIC | Facility: CLINIC | Age: 88
End: 2023-02-21
Payer: MEDICARE

## 2023-02-21 ENCOUNTER — TELEPHONE (OUTPATIENT)
Dept: CARDIOLOGY | Facility: CLINIC | Age: 88
End: 2023-02-21
Payer: MEDICARE

## 2023-02-21 DIAGNOSIS — I48.0 PAROXYSMAL ATRIAL FIBRILLATION: ICD-10-CM

## 2023-02-21 DIAGNOSIS — N19 HYPERTENSIVE HEART AND RENAL DISEASE WITH BOTH (CONGESTIVE) HEART FAILURE AND RENAL FAILURE: Primary | ICD-10-CM

## 2023-02-21 DIAGNOSIS — R06.09 DOE (DYSPNEA ON EXERTION): ICD-10-CM

## 2023-02-21 DIAGNOSIS — I13.2 HYPERTENSIVE HEART AND RENAL DISEASE WITH BOTH (CONGESTIVE) HEART FAILURE AND RENAL FAILURE: Primary | ICD-10-CM

## 2023-02-21 RX ORDER — METOLAZONE 2.5 MG/1
2.5 TABLET ORAL DAILY
Qty: 3 TABLET | Refills: 0 | Status: SHIPPED | OUTPATIENT
Start: 2023-02-21 | End: 2023-02-24

## 2023-02-21 NOTE — TELEPHONE ENCOUNTER
"Pt called stating that her Afib was back and she was SOB. States that she cannot check her O2 sat at this time. Pt called Dr. Ramirez and he advised that she go to the ED. I also advised pt that if she was SOB and "getting worse" as she stated that she needed to go to the ED. Pt stated that she wanted to talk to Heather or Dr. Deleon.   "

## 2023-02-21 NOTE — TELEPHONE ENCOUNTER
Trevon Ramirez MD  You 35 minutes ago (12:05 PM)       If she is very SOB I recommend ER eval and possible admission for IV diuresis thanks    I called pt back with recommendations    She says she will just get recs from her PCP at this time.   I let her know I will fwd this message to them.

## 2023-02-21 NOTE — TELEPHONE ENCOUNTER
Pt reports continued dyspnea jonny when walking over past 2 weeks but now w SOB even at rest or when talking.   Enrolled w a CHF remote monitoring system. Reports her CHF monitor stopped working 3 days ago - waiting to hear back from someone on that.  Denies palpitations but notes a-fib intermittently when checking BP which she does usually 3x day.  Says was in afib all 3 times she checked yesterday BP but was not when she checked today.  Says BP has been stable 120's/70's HR 60-75 pulse ox 93% though sometimes less.  Has noted increased ankle edema and non-productive cough.  Taking furosemide 40 mg BID.  She was advised by cardiology and [facility] RN case manager to go to ED for further evaluation and treatment but declines to do so.  If she is stable and does not want to go to ED as advised I recommend CXR and labwork today and clinic f/u w me tomorrow but she declined this as well.  I will send short course of metolazone to her pharmacy and once again advised to go to ED if condition worsens and that would recommend f/u in clinic tomorrow w me or later this week w Heather Terry or me.    Ms. Bettencourt has an eye appointment at [facility] tomorrow. After I explained to hr maxim Romero had wanted to repeat labwork 2 weeks after the 2/03 encounter she agreed to my ordering to be done tomorrow at [facility].

## 2023-02-21 NOTE — TELEPHONE ENCOUNTER
"Pt called today to let us know she is not feeling well.   Wt is stable at 178lbs  Taking lasix 40mg bid  Says she just feels very sob that has become constant and weak and "worn out".   Also has had a dry cough.   She did want us to be aware she received her covid vaccine 6 days ago and since this sob and weakness has worsened.   She says bp is "great" 120's/70's.   No LE edema    Please advise.     * of note*  She did take metolazone 2.5mg x 3 days on 2/3 given by her pcp. (May see office note from pcp Angela on 2/3)   "

## 2023-02-22 ENCOUNTER — HOSPITAL ENCOUNTER (OUTPATIENT)
Dept: RADIOLOGY | Facility: HOSPITAL | Age: 88
Discharge: HOME | End: 2023-02-22
Attending: INTERNAL MEDICINE
Payer: MEDICARE

## 2023-02-22 ENCOUNTER — OFFICE VISIT (OUTPATIENT)
Dept: OPHTHALMOLOGY | Facility: CLINIC | Age: 88
End: 2023-02-22
Payer: MEDICARE

## 2023-02-22 DIAGNOSIS — R06.09 DOE (DYSPNEA ON EXERTION): ICD-10-CM

## 2023-02-22 DIAGNOSIS — H40.1131 PRIMARY OPEN ANGLE GLAUCOMA (POAG) OF BOTH EYES, MILD STAGE: ICD-10-CM

## 2023-02-22 DIAGNOSIS — I13.2 HYPERTENSIVE HEART AND RENAL DISEASE WITH BOTH (CONGESTIVE) HEART FAILURE AND RENAL FAILURE: ICD-10-CM

## 2023-02-22 DIAGNOSIS — H52.7 REFRACTIVE ERROR: Primary | ICD-10-CM

## 2023-02-22 DIAGNOSIS — N19 HYPERTENSIVE HEART AND RENAL DISEASE WITH BOTH (CONGESTIVE) HEART FAILURE AND RENAL FAILURE: ICD-10-CM

## 2023-02-22 DIAGNOSIS — I48.0 PAROXYSMAL ATRIAL FIBRILLATION: ICD-10-CM

## 2023-02-22 PROCEDURE — 71046 X-RAY EXAM CHEST 2 VIEWS: CPT | Mod: 26,HCNC,, | Performed by: STUDENT IN AN ORGANIZED HEALTH CARE EDUCATION/TRAINING PROGRAM

## 2023-02-22 PROCEDURE — 99499 UNLISTED E&M SERVICE: CPT | Mod: HCNC,S$GLB,, | Performed by: OPTOMETRIST

## 2023-02-22 PROCEDURE — 99499 NO LOS: ICD-10-PCS | Mod: HCNC,S$GLB,, | Performed by: OPTOMETRIST

## 2023-02-22 PROCEDURE — 1159F MED LIST DOCD IN RCRD: CPT | Mod: HCNC,CPTII,S$GLB, | Performed by: OPTOMETRIST

## 2023-02-22 PROCEDURE — 99999 PR PBB SHADOW E&M-EST. PATIENT-LVL III: ICD-10-PCS | Mod: PBBFAC,HCNC,, | Performed by: OPTOMETRIST

## 2023-02-22 PROCEDURE — 99999 PR PBB SHADOW E&M-EST. PATIENT-LVL III: CPT | Mod: PBBFAC,HCNC,, | Performed by: OPTOMETRIST

## 2023-02-22 PROCEDURE — 71046 X-RAY EXAM CHEST 2 VIEWS: CPT | Mod: TC,HCNC

## 2023-02-22 PROCEDURE — 1159F PR MEDICATION LIST DOCUMENTED IN MEDICAL RECORD: ICD-10-PCS | Mod: HCNC,CPTII,S$GLB, | Performed by: OPTOMETRIST

## 2023-02-22 PROCEDURE — 71046 XR CHEST PA AND LATERAL: ICD-10-PCS | Mod: 26,HCNC,, | Performed by: STUDENT IN AN ORGANIZED HEALTH CARE EDUCATION/TRAINING PROGRAM

## 2023-02-22 NOTE — PROGRESS NOTES
HPI    NP to DKT  Patient here today for blurred vision at near, patient states she cant see   if she holds it past 6 inches.     1. Mild COAG Goal < 19   SLT OD 3/04 (20 to 15) + 3/11 (19 to 15)   SLT OS 5/05 (20 to 14) +5/11 (18-19 to 15) + 3/12 (min resp.) + 3/11/15   (20.5-17.5) + 3/7/18 (24- 21) + 11/3/21 (22.5-17.5)  (Cosopt, Xalatan, and Timolol cause Myalgias)   (Alphagan causes redness) (zioptan too expensive)   Possible steroid responder   2. PCIOL OU   3. Mild Dry AMD   4. Fuch's Dystrophy   DSAEK OD 4/16 Dr. Quintanilla (followed by Dwight every summer)   DSAEK OS 8/15 Dr. Quintanilla   Rx Inveltys (1% lotemax) (IOP 15/23) 5/29/20   Dr. Quintanilla Ok'd to stop Lotemax OU  5. Dry Eye -intolerance to Restasis   6. Recent A-Fib. (from Labetolol ?)       Systane Ultra 4-5 tiimes daily   Travatan Z qhs os   Rhopressa qhs OS   Azopt BID OS    +HAG  Last edited by Nupur Amaya on 2/22/2023  1:16 PM.            Assessment /Plan     For exam results, see Encounter Report.    Refractive error  Patient presents for Rx check today only trial framed below  Eyeglass Final Rx       Eyeglass Final Rx         Sphere Cylinder Axis    Right +1.75 +1.50 036    Left -0.25 +1.00 129      Type: SVL - Reading    Expiration Date: 2/22/2024              Eyeglass Final Rx #2         Sphere Cylinder Axis    Right -0.75 +1.50 036    Left -2.75 +1.00 129      Type: SVL Distance    Expiration Date: 2/22/2024                  Patient trial framed at distance near and computer with updated Mrx. Seeing well. Mrx provided.     Primary open angle glaucoma (POAG) of both eyes, mild stage  Not addressed today, continue per Dr SILVEIRA.     RTC with Dr SILVEIRA as scheduled, sooner if any changes to vision or worsening symptoms.

## 2023-02-23 ENCOUNTER — TELEPHONE (OUTPATIENT)
Dept: PRIMARY CARE CLINIC | Facility: CLINIC | Age: 88
End: 2023-02-23
Payer: MEDICARE

## 2023-02-23 NOTE — TELEPHONE ENCOUNTER
Spoke with pt. States that /83 P 79. Pt states that she is still in Afib. States that she called her cardiologist and left a message. States that she still has a little SOB. Pt states that she is going to call her cardiologist back today. Lab results given to pt.

## 2023-02-24 ENCOUNTER — OFFICE VISIT (OUTPATIENT)
Dept: PRIMARY CARE CLINIC | Facility: CLINIC | Age: 88
End: 2023-02-24
Payer: MEDICARE

## 2023-02-24 VITALS
HEART RATE: 79 BPM | BODY MASS INDEX: 31.38 KG/M2 | SYSTOLIC BLOOD PRESSURE: 125 MMHG | OXYGEN SATURATION: 92 % | RESPIRATION RATE: 20 BRPM | DIASTOLIC BLOOD PRESSURE: 69 MMHG | WEIGHT: 182.81 LBS | TEMPERATURE: 98 F

## 2023-02-24 DIAGNOSIS — D68.9 COAGULOPATHY: ICD-10-CM

## 2023-02-24 DIAGNOSIS — I48.91 ATRIAL FIBRILLATION WITH RVR: ICD-10-CM

## 2023-02-24 DIAGNOSIS — I13.2 HYPERTENSIVE HEART AND RENAL DISEASE WITH BOTH (CONGESTIVE) HEART FAILURE AND RENAL FAILURE: ICD-10-CM

## 2023-02-24 DIAGNOSIS — I50.33 ACUTE ON CHRONIC DIASTOLIC CONGESTIVE HEART FAILURE: Primary | ICD-10-CM

## 2023-02-24 DIAGNOSIS — N19 HYPERTENSIVE HEART AND RENAL DISEASE WITH BOTH (CONGESTIVE) HEART FAILURE AND RENAL FAILURE: ICD-10-CM

## 2023-02-24 PROCEDURE — 1159F PR MEDICATION LIST DOCUMENTED IN MEDICAL RECORD: ICD-10-PCS | Mod: HCNC,CPTII,S$GLB, | Performed by: NURSE PRACTITIONER

## 2023-02-24 PROCEDURE — 3288F PR FALLS RISK ASSESSMENT DOCUMENTED: ICD-10-PCS | Mod: HCNC,CPTII,S$GLB, | Performed by: NURSE PRACTITIONER

## 2023-02-24 PROCEDURE — 1100F PTFALLS ASSESS-DOCD GE2>/YR: CPT | Mod: HCNC,CPTII,S$GLB, | Performed by: NURSE PRACTITIONER

## 2023-02-24 PROCEDURE — 1125F AMNT PAIN NOTED PAIN PRSNT: CPT | Mod: HCNC,CPTII,S$GLB, | Performed by: NURSE PRACTITIONER

## 2023-02-24 PROCEDURE — 1159F MED LIST DOCD IN RCRD: CPT | Mod: HCNC,CPTII,S$GLB, | Performed by: NURSE PRACTITIONER

## 2023-02-24 PROCEDURE — 3288F FALL RISK ASSESSMENT DOCD: CPT | Mod: HCNC,CPTII,S$GLB, | Performed by: NURSE PRACTITIONER

## 2023-02-24 PROCEDURE — 99999 PR PBB SHADOW E&M-EST. PATIENT-LVL IV: CPT | Mod: PBBFAC,HCNC,, | Performed by: NURSE PRACTITIONER

## 2023-02-24 PROCEDURE — 1100F PR PT FALLS ASSESS DOC 2+ FALLS/FALL W/INJURY/YR: ICD-10-PCS | Mod: HCNC,CPTII,S$GLB, | Performed by: NURSE PRACTITIONER

## 2023-02-24 PROCEDURE — 99215 PR OFFICE/OUTPT VISIT, EST, LEVL V, 40-54 MIN: ICD-10-PCS | Mod: HCNC,S$GLB,, | Performed by: NURSE PRACTITIONER

## 2023-02-24 PROCEDURE — 1125F PR PAIN SEVERITY QUANTIFIED, PAIN PRESENT: ICD-10-PCS | Mod: HCNC,CPTII,S$GLB, | Performed by: NURSE PRACTITIONER

## 2023-02-24 PROCEDURE — 99215 OFFICE O/P EST HI 40 MIN: CPT | Mod: HCNC,S$GLB,, | Performed by: NURSE PRACTITIONER

## 2023-02-24 PROCEDURE — 99999 PR PBB SHADOW E&M-EST. PATIENT-LVL IV: ICD-10-PCS | Mod: PBBFAC,HCNC,, | Performed by: NURSE PRACTITIONER

## 2023-02-24 RX ORDER — METOLAZONE 2.5 MG/1
2.5 TABLET ORAL DAILY
Qty: 30 TABLET | Refills: 0 | Status: SHIPPED | OUTPATIENT
Start: 2023-02-24 | End: 2023-03-03

## 2023-02-24 RX ORDER — METOLAZONE 2.5 MG/1
2.5 TABLET ORAL DAILY
Qty: 3 TABLET | Refills: 0 | Status: SHIPPED | OUTPATIENT
Start: 2023-02-24 | End: 2023-02-27

## 2023-02-24 RX ORDER — FUROSEMIDE 40 MG/1
40 TABLET ORAL 2 TIMES DAILY
Qty: 30 TABLET | Refills: 0
Start: 2023-02-24 | End: 2023-03-10

## 2023-02-24 NOTE — ASSESSMENT & PLAN NOTE
HF likely not compensated. Continue metolazone for now, furosemide, losartan. To ER if sx worsen.  Message to cardiology.

## 2023-02-24 NOTE — PROGRESS NOTES
Shirley Metz  02/24/2023  [id number]    Phylicia Deleon MD  Patient Care Team:  Phylicia Deleon MD as PCP - General (Internal Medicine)  Wilbert Ware MD as Consulting Physician (Ophthalmology)  Rajinder Quintanilla MD as Consulting Physician (Ophthalmology)  Suzan Gregorio PA-C as Physician Assistant (Rheumatology)  Rosario Valadez MD as Consulting Physician (Dermatology)  Trevon Ramirez MD as Consulting Physician (Cardiology)  Amish Mendoza MD (Pulmonary Disease)  Jaquan Choi MD as Consulting Physician (Vascular Surgery)  Debbie Choi MD as Consulting Physician (Gynecology)  Emmanuel Fish MD as Consulting Physician (Hand Surgery)  PAXTON Chaidez Jr., MD as Consulting Physician (Orthopedic Surgery)  Phylicia Deleon MD as Physician (Internal Medicine)  Heather Miller NP as Nurse Practitioner (Family Medicine)      [facility] Note      Chief Complaint:  No chief complaint on file.      History of Present Illness:  HPI    F/U Afib.     [place] bad yesterday. RUIZ more severe with cough and headache. Usually feels better int he afternoons but not yesterday. RUIZ is becoming worse. Stops to rest multiple times with activity.     Note on 2/21/23: Pt reports continued dyspnea jonny when walking over past 2 weeks but now w SOB even at rest or when talking.   Enrolled w a CHF remote monitoring system. Reports her CHF monitor stopped working 3 days ago - waiting to hear back from someone on that.  Denies palpitations but notes a-fib intermittently when checking BP which she does usually 3x day.  Says was in afib all 3 times she checked yesterday BP but was not when she checked today.  Says BP has been stable 120's/70's HR 60-75 pulse ox 93% though sometimes less.  Has noted increased ankle edema and non-productive cough.    Heart monitor stopped working after a few days.     Appetite remains fair-poor.     Started metolazone 2.5 mg daily x 3 days on 2/22/23. Still dyspneic,  no improvement. Has helped with ankle and trunk edema, not RUIZ.           Review of Systems   Constitutional:  Positive for malaise/fatigue. Negative for chills and fever.   Respiratory:  Positive for cough (dry) and shortness of breath. Negative for sputum production.    Cardiovascular:  Positive for leg swelling. Negative for chest pain, palpitations and orthopnea.       The following were reviewed: Active problem list, medication list, allergies, family history, social history, and Health Maintenance.     History:  Past Medical History:   Diagnosis Date    Anemia 11/29/2018    Anxiety 11/28/2017    Arthritis     Atrial fibrillation with RVR 10/9/2019    Back pain     Basal cell carcinoma 03/29/2018    left mid back    Chronic diastolic congestive heart failure 10/15/2019    CKD (chronic kidney disease) stage 3, GFR 30-59 ml/min 8/8/2016    Colon polyps     reported per patient.     Coronary artery calcification 10/5/2021    Fuchs' corneal dystrophy     General anesthetics causing adverse effect in therapeutic use     woke up during surgery and gagged on ET tube    Glaucoma     Glaucoma     Heart failure with preserved left ventricular function (HFpEF) 7/24/2018    Hyperlipidemia     Hypertension     Macular degeneration dry    Obesity     PVD (peripheral vascular disease) 4/26/2021    PVD (peripheral vascular disease) 4/26/2021    Squamous cell carcinoma 01/08/2019    left shoulder    Stenosis of carotid artery 10/5/2021    Trouble in sleeping     Type 2 diabetes mellitus without complication, without long-term current use of insulin 2/24/2021    Urinary tract infection      Past Surgical History:   Procedure Laterality Date    ABLATION OF ARRHYTHMOGENIC FOCUS FOR ATRIAL FIBRILLATION N/A 11/3/2022    Procedure: Ablation atrial fibrillation;  Surgeon: Toi Kelly MD;  Location: [facility];  Service: Cardiology;  Laterality: N/A;  afib, PVI/CTI, RFA, STACEY (cx if SR), DEDE, anes, MB, 3 Prep    ADENOIDECTOMY   1938    BREAST BIOPSY Left     1997. benign    BREAST CYST EXCISION Left 1997 approx    CARPAL TUNNEL RELEASE Right 2012 approx    CATARACT EXTRACTION Bilateral 1994    CHOLECYSTECTOMY  1975    CORNEAL TRANSPLANT Bilateral 08/2015 8/2015 - left; Right 4/2016    EYE SURGERY      Fuch's Corneal dystrophy - had 2nd operation with Dr. Quintanilla    EYE SURGERY      Cataract wIOL    left thumb Left 2011    removed cuboid for arthritis    REVERSE TOTAL SHOULDER ARTHROPLASTY Left 8/21/2018    Procedure: ARTHROPLASTY, SHOULDER, TOTAL, REVERSE;  Surgeon: Solomon Lujan MD;  Location: [facility] OR;  Service: Orthopedics;  Laterality: Left;  WITH BICEP TENODESIS    SKIN CANCER EXCISION Left 2017    BCC removal by Dr. Valadez    SLT- OS (aka TRABECULOPLASTY) Left 05/11/2011    repeat 3/14/12    TENOTOMY Left 8/21/2018    Procedure: TENOTOMY;  Surgeon: Solomon Lujan MD;  Location: [facility] OR;  Service: Orthopedics;  Laterality: Left;    TONSILLECTOMY  1938    TREATMENT OF CARDIAC ARRHYTHMIA N/A 10/10/2019    Procedure: CARDIOVERSION;  Surgeon: Pete Durán MD;  Location: [facility] CATH LAB;  Service: Cardiology;  Laterality: N/A;    TREATMENT OF CARDIAC ARRHYTHMIA N/A 12/6/2019    Procedure: CARDIOVERSION;  Surgeon: Samy Castillo MD;  Location: [facility] CATH LAB;  Service: Cardiology;  Laterality: N/A;     Family History   Problem Relation Age of Onset    Heart disease Mother     Hypertension Mother     Cancer Father 88        sarcoma( ?Kaposi's ) on leg    Deep vein thrombosis Neg Hx     Ovarian cancer Neg Hx     Breast cancer Neg Hx     Kidney disease Neg Hx     Strabismus Neg Hx     Retinal detachment Neg Hx     Macular degeneration Neg Hx     Glaucoma Neg Hx     Blindness Neg Hx     Amblyopia Neg Hx     Eczema Neg Hx     Lupus Neg Hx     Melanoma Neg Hx     Psoriasis Neg Hx     Diabetes Neg Hx     Stroke Neg Hx     Mental retardation Neg Hx     Mental illness Neg Hx     Hyperlipidemia Neg Hx     COPD Neg Hx     Asthma Neg Hx      Depression Neg Hx     Alcohol abuse Neg Hx     Drug abuse Neg Hx      Social History     Socioeconomic History    Marital status:     Number of children: 3   Tobacco Use    Smoking status: Never    Smokeless tobacco: Never    Tobacco comments:     history of passive smoking from her ex-   Substance and Sexual Activity    Alcohol use: Yes     Alcohol/week: 2.0 standard drinks     Types: 2 Standard drinks or equivalent per week     Comment: occ    Drug use: No    Sexual activity: Not Currently     Partners: Male     Birth control/protection: Post-menopausal     Comment: discussed protection for STD's   Other Topics Concern    Are you pregnant or think you may be? No    Breast-feeding No   Social History Narrative     x 2,  x 1. Retired artist - previously doing jewelry/wall pieces; ; lives in [facility]; has 3 sons - 52( lives in [place], 54, and 56;exercises minimally - limited due to back issues. Still drives. Does have a Living Will.     Social Determinants of Health     Physical Activity: Unknown    Days of Exercise per Week: Patient refused    Minutes of Exercise per Session: 10 min   Stress: No Stress Concern Present    Feeling of Stress : Not at all     Patient Active Problem List   Diagnosis    HTN (hypertension)    Mixed hyperlipidemia    Primary osteoarthritis involving multiple joints    Macular degeneration - Both Eyes    Fuch's endothelial dystrophy - Both Eyes    Lens replaced by other means    COAG (chronic open-angle glaucoma) - Both Eyes    Blepharitis, unspecified - Both Eyes    Shortness of breath    Abnormal ECG    PVC's (premature ventricular contractions)    Other microscopic hematuria    LVH (left ventricular hypertrophy) due to hypertensive disease    Osteopenia of hip    Myalgia    Calcification of aorta    Neuropathy    Unequal blood pressures in paired extremities    Medication side effects    History of cornea transplant    Pseudophakia     Insomnia    Anxiety    Pre-diabetes    Obesity (BMI 30.0-34.9)    Elevated troponin    Bilateral shoulder region arthritis    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    Anemia    New onset a-fib    Thrombus of left atrial appendage    Paroxysmal atrial fibrillation    Elevated liver enzymes    Atrial flutter with rapid ventricular response    Chronic anticoagulation    Keratitis sicca, bilateral    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    PAD (peripheral artery disease)    Statin intolerance    Pulmonary nodules/lesions, multiple    Spinal stenosis at L4-L5 level    Pulmonary granuloma    Lumbar spondylosis    Bilateral recurrent inguinal hernia without obstruction or gangrene    LLQ abdominal pain    Pain in left leg    Rectal abnormality    Stenosis of carotid artery    Carotid artery calcification, bilateral    B12 deficiency    Seasonal allergic rhinitis    Atrial fibrillation with RVR    Elevated serum creatinine    Coagulopathy    Atheroma of artery    Acute on chronic diastolic congestive heart failure    Troponin level elevated     Review of patient's allergies indicates:   Allergen Reactions    Carvedilol Swelling     Lip swelling    Cephalexin Other (See Comments)     Muscle/joint weakness unable to move     Doxycycline Shortness Of Breath, Nausea And Vomiting and Other (See Comments)     weakness    Erythromycin Other (See Comments)     Complete weakness/could not walk    Gadolinium-containing contrast media Swelling     angioedema    Hydrocodone-acetaminophen Shortness Of Breath     wheezing    Inveltys [loteprednol etabonate] Palpitations and Other (See Comments)     Patient stated bp went up.    Labetalol Shortness Of Breath, Nausea Only, Palpitations and Other (See Comments)     weakness    Loratadine Other (See Comments)     Double vision/spots  Other reaction(s): double vision    Naproxen      wheezing  wheezing  wheezing  Other reaction(s):  wheezing    Statins-hmg-coa reductase inhibitors Other (See Comments)     Severe Muscle weakness  Muscle weakness  Severe Muscle weakness  Other reaction(s): severe muscle weakness    Amlodipine Other (See Comments)     Myalgias    Fenofibrate Other (See Comments)     muscle weakness      Hydralazine Other (See Comments)     muscle tremors    Hydralazine analogues Other (See Comments)     Muscle tremors/aches      Loteprednol Other (See Comments)     increased BP    Macrolide antibiotics Other (See Comments)     Complete weakness/could not walk      Moxifloxacin Hives and Other (See Comments)     muscle soreness    Timolol Other (See Comments)     Blurred vision, Burning eyes, Severe muscle weakness, eye problems.      Verapamil Diarrhea     Severe diarrhea.      Hydrocortisone     Isosorbide      Tolerates Imdur    Tetanus and diphtheria toxoids     Adhesive Rash     Clonidine patch--contact dermatitis    Codeine Diarrhea and Other (See Comments)     Loss of balance       Doxazosin Palpitations    Fexofenadine Other (See Comments)     Double vision/spots       Hydrocortisone (bulk) Other (See Comments)     Only suppository/ caused muscle weakness    Latanoprost Other (See Comments)     Muscle weakness      Olopatadine Other (See Comments)     Muscle weakness      Prednisone Anxiety       Medications:  Current Outpatient Medications on File Prior to Visit   Medication Sig Dispense Refill    acetaminophen (TYLENOL) 500 MG tablet Take 500 mg by mouth nightly as needed.      ALPRAZolam (XANAX) 0.5 MG tablet Take 1 tablet (0.5 mg total) by mouth nightly as needed for Anxiety. 30 tablet 5    apixaban (ELIQUIS) 2.5 mg Tab Take 1 tablet (2.5 mg total) by mouth 2 (two) times daily. 180 tablet 3    b complex vitamins capsule Take 1 capsule by mouth once daily.      brinzolamide (AZOPT) 1 % ophthalmic suspension Place 1 drop into the left eye 2 (two) times a day. (Patient taking differently: Place 1 drop into the left eye  every morning.) 5 mL 12    cholecalciferol, vitamin D3, (VITAMIN D3) 50 mcg (2,000 unit) Cap Take 1 capsule by mouth once daily.      coenzyme Q10 200 mg capsule Take 200 mg by mouth once daily.      fish oil-omega-3 fatty acids 300-1,000 mg capsule Take 1 capsule by mouth once daily.      losartan (COZAAR) 25 MG tablet Take 1 tablet (25 mg total) by mouth once daily. 180 tablet 1    metOLazone (ZAROXOLYN) 2.5 MG tablet Take 1 tablet (2.5 mg total) by mouth once daily. Take 30 minutes prior to first daily dose of furosemide for 3 days 3 tablet 0    metoprolol tartrate (LOPRESSOR) 25 MG tablet Take 1 tablet (25 mg total) by mouth 2 (two) times daily. 60 tablet 11    POLYETHYLENE GLYCOL 3350 (MIRALAX ORAL) Take 17 g by mouth 2 (two) times a day. Taking BID      RHOPRESSA 0.02 % ophthalmic solution PLACE 1 DROP INTO THE LEFT EYE EVERY EVENING. 2.5 mL 11    sars-cov-2, covid-19 pfizer omicron, (PFIZER COVID BIVAL,12Y UP,,PF,) 30 mcg/0.3 mL injection Inject into the muscle. 0.3 mL 0    TRAVATAN Z 0.004 % ophthalmic solution Place 1 drop into the left eye every evening. 2.5 mL 12    [DISCONTINUED] cloNIDine (CATAPRES) 0.1 MG tablet Take 1 tablet (0.1 mg total) by mouth 2 (two) times daily as needed (systolic BP over 180). 20 tablet 1    [DISCONTINUED] furosemide (LASIX) 40 MG tablet Take 1 tablet (40 mg total) by mouth 2 (two) times a day. 30 tablet 0    [DISCONTINUED] isosorbide mononitrate (IMDUR) 30 MG 24 hr tablet Take 1 tablet (30 mg total) by mouth every evening. 90 tablet 1     Current Facility-Administered Medications on File Prior to Visit   Medication Dose Route Frequency Provider Last Rate Last Admin    sodium chloride 0.9% flush 3 mL  3 mL Intravenous PRN Steve Galarza PA-C        vancomycin in dextrose 5 % 1 gram/250 mL IVPB 1,000 mg  1,000 mg Intravenous On Call Procedure Vianney Fountain NP           Medications have been reviewed and reconciled with patient at visit today.    Barriers to  medications present (no )    Fall since last office visit (no )      Exam:  Vitals:    02/24/23 1500   BP:    Pulse: 79   Resp: 20   Temp:      Weight: 82.9 kg (182 lb 12.8 oz)   Body mass index is 31.38 kg/m².      BP Readings from Last 3 Encounters:   02/24/23 125/69   02/03/23 (!) 119/56   02/01/23 126/68     Wt Readings from Last 3 Encounters:   02/24/23 1444 82.9 kg (182 lb 12.8 oz)   02/03/23 0930 84.6 kg (186 lb 9.6 oz)   02/01/23 0939 84.7 kg (186 lb 11.7 oz)        EXAM:  XR CHEST PA AND LATERAL     CLINICAL HISTORY:  Hypertension.     COMPARISON: 01/10/2023 chest radiograph.     TECHNIQUE: PA and lateral views of the chest.     FINDINGS:     Medical support devices: None.  Cardiomediastinal structures: Enlarged cardiac silhouette, stable when compared to the prior examination.  Mild cephalization of the pulmonary vasculature.  Lungs/pleura: Mild diffuse interstitial prominence. No pleural effusion. No pneumothorax.  Miscellaneous: No free air under the diaphragm. No acute osseous abnormality.        Impression:     1. Findings concerning for mild pulmonary edema, possibly related to CHF.  Similar appearance compared to the prior examination.  Recommend clinical correlation and continued follow-up as warranted.     Finalized on: 2/22/2023 2:25 PM By:  Amish Fields III, MD      Physical Exam  Constitutional:       General: She is not in acute distress.  HENT:      Mouth/Throat:      Mouth: Mucous membranes are moist.   Cardiovascular:      Rate and Rhythm: Normal rate.      Heart sounds: Normal heart sounds.      Comments: Irregular irregular  Pulmonary:      Effort: No respiratory distress.      Breath sounds: Rales (right base) present.      Comments: Mild dyspnea with speech  Abdominal:      General: There is no distension.      Palpations: Abdomen is soft.   Musculoskeletal:         General: Swelling (nonpitting BLE) present.      Cervical back: Neck supple.   Skin:     General: Skin is warm and dry.    Neurological:      Mental Status: She is alert. Mental status is at baseline.   Psychiatric:         Mood and Affect: Mood normal.         Behavior: Behavior normal.       Laboratory Reviewed: (Yes)  Lab Results   Component Value Date    WBC 6.63 01/12/2023    HGB 11.5 (L) 01/12/2023    HCT 35.6 (L) 01/12/2023     01/12/2023    CHOL 168 08/31/2022    TRIG 98 08/31/2022    HDL 43 08/31/2022    ALT 16 01/12/2023    AST 21 01/12/2023     02/22/2023    K 4.1 02/22/2023     02/22/2023    CREATININE 2.2 (H) 02/22/2023    BUN 41 (H) 02/22/2023    CO2 24 02/22/2023    TSH 3.826 08/31/2022    INR 1.1 10/25/2022    HGBA1C 6.2 (H) 08/31/2022           Health Maintenance  Health Maintenance Topics with due status: Not Due       Topic Last Completion Date    Hemoglobin A1c (Prediabetes) 08/31/2022    Lipid Panel 08/31/2022     Health Maintenance Due   Topic Date Due    Shingles Vaccine (1 of 2) Never done    TETANUS VACCINE  11/09/2021    Pneumococcal Vaccines (Age 65+) (2 - PCV) 03/16/2023       Assessment:  Problem List Items Addressed This Visit          Cardiac/Vascular    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 Continue metolazone for now, furosemide, losartan. To ER if sx worsen.  Message to cardiology.            Atrial fibrillation with RVR     Likely Afib today.   Rate controlled with metoprolol.   Continues apixaban.          Acute on chronic diastolic congestive heart failure - Primary    Relevant Medications    metOLazone (ZAROXOLYN) 2.5 MG tablet    metOLazone (ZAROXOLYN) 2.5 MG tablet    furosemide (LASIX) 40 MG tablet    Other Relevant Orders    RENAL FUNCTION PANEL       Hematology    Coagulopathy     Lab Results   Component Value Date    HGB 11.5 (L) 01/12/2023   Repeat CBC next week.                 Plan:  Acute on chronic diastolic congestive heart failure  -     metOLazone (ZAROXOLYN) 2.5 MG tablet; Take 1  tablet (2.5 mg total) by mouth once daily.  Dispense: 30 tablet; Refill: 0  -     metOLazone (ZAROXOLYN) 2.5 MG tablet; Take 1 tablet (2.5 mg total) by mouth once daily. for 3 days  Dispense: 3 tablet; Refill: 0  -     RENAL FUNCTION PANEL; Future; Expected date: 03/01/2023  -     furosemide (LASIX) 40 MG tablet; Take 1 tablet (40 mg total) by mouth 2 (two) times a day.  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 Patient expressed understanding. Patient was given the opportunity to ask questions and be an active participant in their medical care. Patient had no further questions or concerns at this time.   -Documentation of patient's health and condition was obtained from family member who was present during visit.  -Patient is an overall moderate risk for health complications from their medical conditions.       Follow up: Follow up in about 2 weeks (around 3/10/2023).      After visit summary printed and given to patient upon discharge.  Patient goals and care plan are included in After visit summary.    Total medical decision making time was 49 min.  The following issues were discussed: The primary encounter diagnosis was Acute on chronic diastolic congestive heart failure. 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    Health maintenance needs, recent test results and goals of care discussed with pt and questions answered.

## 2023-02-24 NOTE — Clinical Note
Good afternoon - I saw Ms Rg in clinic for RUIZ that's becoming worse the past few weeks. CXR on 2/22 shows mild pulm edema. She has rales on exam today. We added metolazone 2.5 mg daily on 2/22 x 3 days. She hasn't had much relief of RUIZ with metolazone. I'm planning to continue metolazone past 3 days with losartan and furosemide. Any other recommendations at this time? Thank you

## 2023-02-27 ENCOUNTER — TELEPHONE (OUTPATIENT)
Dept: PRIMARY CARE CLINIC | Facility: CLINIC | Age: 88
End: 2023-02-27
Payer: MEDICARE

## 2023-02-27 DIAGNOSIS — I50.33 ACUTE ON CHRONIC DIASTOLIC CONGESTIVE HEART FAILURE: ICD-10-CM

## 2023-02-27 RX ORDER — METOLAZONE 2.5 MG/1
2.5 TABLET ORAL
Qty: 8 TABLET | Refills: 0 | Status: SHIPPED | OUTPATIENT
Start: 2023-02-27 | End: 2023-03-10

## 2023-02-27 NOTE — TELEPHONE ENCOUNTER
Spoke with pt states that she is doing better.  States that SOB has improved and vitals have been normal. Pt states that weight is down 5 lbs since last week.

## 2023-02-27 NOTE — TELEPHONE ENCOUNTER
Message left for patient to call the office back. Needs further information on: SOB, BP, HR and weight are doing? She's taking metolazone daily now for pulm edema

## 2023-02-27 NOTE — TELEPHONE ENCOUNTER
----- Message from Heather Miller NP sent at 2/27/2023  8:50 AM CST -----  Regarding: FW:  Would you call to see how her RUIZ, BP, HR and weight are doing? She's taking metolazone daily now for pulm edema  ----- Message -----  From: Toi Kelly MD  Sent: 2/26/2023   3:29 PM CST  To: JERARDO Villa, Heather Miller NP  Subject: RE:                                              I would defer HF management to JERARDO Call. Thank you  ----- Message -----  From: Heather Miller NP  Sent: 2/24/2023   4:14 PM CST  To: JERARDO Villa, Toi Kelly MD    Good afternoon - I saw Ms Rg in clinic for RUIZ that's becoming worse the past few weeks. CXR on 2/22 shows mild pulm edema. She has rales on exam today. We added metolazone 2.5 mg daily on 2/22 x 3 days. She hasn't had much relief of RUIZ with metolazone. I'm planning to continue metolazone past 3 days with losartan and furosemide. Any other recommendations at this time?  Thank you

## 2023-02-28 NOTE — H&P (VIEW-ONLY)
Subjective:    Patient ID:  Shirley Metz is a 89 y.o. female who presents for follow-up of Atrial Fibrillation      89 yoF referred for AF management.     8/17/22: NSR on recent ECGs up until 6/15/22. AFL and AF on ECGs afterwards starting 7/11/22. Event monitor with 100% AF, controlled average V rate. Normal EF. She had AF after a new BP agent 10/19. Her symptoms are mild. She has tried amiodarone in the past which caused side effects. She has history of CAD and has QTc 450s.     9/22: AF 9/2/22 ECG. She continues to have symptomatic AF as well as symptoms on medication. She wishes to pursue ablation.     2/23: PVI/CTI 11/3/22. She had upper lip swelling after her PVI, suspected to be due to trauma from the STACEY/ET intubations. She had some complaints about the overnight care. She was in NSR on ECGs up until 1/5/23. 1/10/23 she was in atypical AFL. She takes lasix 40 mg qd and sometimes at night. Her AF episodes are few and far between based on her home BP and HR checks. She still complains of SOB. She has a lasix plan provided by the HF clinic.     Interval history: Event monitor performed but not ready for assessment. She has continued HF symptoms. She is in AF today    Ablation 11/22:  ·  CTI ablation.  ·  Pulmonary vein isolation.  ·  Intracardiac echo.  ·  3D mapping performed with Ensite.    Echo 7/12/22:  · Concentric remodeling and normal systolic function.  · The estimated ejection fraction is 60%.  · Normal left ventricular diastolic function.  · Normal right ventricular size with normal right ventricular systolic function.    Event monitor 7/22:  - Heart rates varied between 41 and 123 BPM with an average of 77 BPM.  - Predominant rhythm: atrial fibrillation   - Atrial Fibrillation (AF) 100.0 %  - PVC 0.48 %    Past Medical History:  11/29/2018: Anemia  11/28/2017: Anxiety  No date: Arthritis  10/9/2019: Atrial fibrillation with RVR  No date: Back pain  03/29/2018: Basal cell carcinoma       Comment:  left mid back  10/15/2019: Chronic diastolic congestive heart failure  8/8/2016: CKD (chronic kidney disease) stage 3, GFR 30-59 ml/min  No date: Colon polyps      Comment:  reported per patient.   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  No date: General anesthetics causing adverse effect in therapeutic use      Comment:  woke up during surgery and gagged on ET tube  No date: Glaucoma  No date: Glaucoma  7/24/2018: Heart failure with preserved left ventricular function   (HFpEF)  No date: Hyperlipidemia  No date: Hypertension  dry: Macular degeneration  No date: Obesity  4/26/2021: PVD (peripheral vascular disease)  4/26/2021: PVD (peripheral vascular disease)  01/08/2019: Squamous cell carcinoma      Comment:  left shoulder  10/5/2021: Stenosis of carotid artery  No date: Trouble in sleeping  2/24/2021: Type 2 diabetes mellitus without complication, without   long-term current use of insulin  No date: Urinary tract infection    Past Surgical History:  11/3/2022: ABLATION OF ARRHYTHMOGENIC FOCUS FOR ATRIAL FIBRILLATION;   N/A      Comment:  Procedure: Ablation atrial fibrillation;  Surgeon:                Toi Kelly MD;  Location: [facility] EP LAB;  Service:               Cardiology;  Laterality: N/A;  afib, PVI/CTI, RFA, STACEY                (cx if SR), DEDE, ABDIEL orantes, 3 Prep  1938: ADENOIDECTOMY  No date: BREAST BIOPSY; Left      Comment:  1997. benign  1997 approx: BREAST CYST EXCISION; Left  2012 approx: CARPAL TUNNEL RELEASE; Right  1994: CATARACT EXTRACTION; Bilateral  1975: CHOLECYSTECTOMY  08/2015: CORNEAL TRANSPLANT; Bilateral      Comment:  8/2015 - left; Right 4/2016  No date: EYE SURGERY      Comment:  Fuch's Corneal dystrophy - had 2nd operation with Dr. Quintanilla  No date: EYE SURGERY      Comment:  Cataract wIOL  2011: left thumb; Left      Comment:  removed cuboid for arthritis  8/21/2018: REVERSE TOTAL SHOULDER ARTHROPLASTY; Left       Comment:  Procedure: ARTHROPLASTY, SHOULDER, TOTAL, REVERSE;                 Surgeon: Solomon Lujan MD;  Location: [facility] OR;                 Service: Orthopedics;  Laterality: Left;  WITH BICEP                TENODESIS  2017: SKIN CANCER EXCISION; Left      Comment:  BCC removal by Dr. Valadez  05/11/2011: SLT- OS (aka TRABECULOPLASTY); Left      Comment:  repeat 3/14/12  8/21/2018: TENOTOMY; Left      Comment:  Procedure: TENOTOMY;  Surgeon: Solomon Lujan MD;                 Location: [facility] OR;  Service: Orthopedics;  Laterality:                Left;  1938: TONSILLECTOMY  10/10/2019: TREATMENT OF CARDIAC ARRHYTHMIA; N/A      Comment:  Procedure: CARDIOVERSION;  Surgeon: Pete Durán MD;                Location: [facility] CATH LAB;  Service: Cardiology;                 Laterality: N/A;  12/6/2019: TREATMENT OF CARDIAC ARRHYTHMIA; N/A      Comment:  Procedure: CARDIOVERSION;  Surgeon: Samy Castillo MD;                 Location: [facility] CATH LAB;  Service: Cardiology;                 Laterality: N/A;    Social History    Socioeconomic History      Marital status:       Number of children: 3    Tobacco Use      Smoking status: Never      Smokeless tobacco: Never      Tobacco comments: history of passive smoking from her ex-    Substance and Sexual Activity      Alcohol use: Yes        Alcohol/week: 2.0 standard drinks        Types: 2 Standard drinks or equivalent per week        Comment: occ      Drug use: No      Sexual activity: Not Currently        Partners: Male        Birth control/protection: Post-menopausal        Comment: discussed protection for STD's    Other Topics      Concerns:        Are you pregnant or think you may be?: No        Breast-feeding: No    Social History Narrative       x 2,  x 1. Retired artist - previously doing jewelry/wall pieces; ; lives in [facility]; has 3 sons - 52( lives in [place], 54, and 56;exercises minimally - limited due to  back issues. Still drives. Does have a Living Will.    Social Determinants of Health  Physical Activity: Unknown      Days of Exercise per Week: Patient refused      Minutes of Exercise per Session: 10 min  Stress: No Stress Concern Present      Feeling of Stress : Not at all    Review of patient's family history indicates:    Current Outpatient Medications:  acetaminophen (TYLENOL) 500 MG tablet, Take 500 mg by mouth nightly as needed., Disp: , Rfl:   ALPRAZolam (XANAX) 0.5 MG tablet, Take 1 tablet (0.5 mg total) by mouth nightly as needed for Anxiety., Disp: 30 tablet, Rfl: 5  apixaban (ELIQUIS) 2.5 mg Tab, Take 1 tablet (2.5 mg total) by mouth 2 (two) times daily., Disp: 180 tablet, Rfl: 3  b complex vitamins capsule, Take 1 capsule by mouth once daily., Disp: , Rfl:   brinzolamide (AZOPT) 1 % ophthalmic suspension, Place 1 drop into the left eye 2 (two) times a day. (Patient taking differently: Place 1 drop into the left eye every morning.), Disp: 5 mL, Rfl: 12  cholecalciferol, vitamin D3, (VITAMIN D3) 50 mcg (2,000 unit) Cap, Take 1 capsule by mouth once daily., Disp: , Rfl:   coenzyme Q10 200 mg capsule, Take 200 mg by mouth once daily., Disp: , Rfl:    fish oil-omega-3 fatty acids 300-1,000 mg capsule, Take 1 capsule by mouth once daily., Disp: , Rfl:   furosemide (LASIX) 40 MG tablet, Take 1 tablet (40 mg total) by mouth 2 (two) times a day., Disp: 30 tablet, Rfl: 0  losartan (COZAAR) 25 MG tablet, Take 1 tablet (25 mg total) by mouth once daily., Disp: 180 tablet, Rfl: 1  metOLazone (ZAROXOLYN) 2.5 MG tablet, Take 1 tablet (2.5 mg total) by mouth once daily., Disp: 30 tablet, Rfl: 0  metOLazone (ZAROXOLYN) 2.5 MG tablet, Take 1 tablet (2.5 mg total) by mouth twice a week., Disp: 8 tablet, Rfl: 0  metoprolol tartrate (LOPRESSOR) 25 MG tablet, Take 1 tablet (25 mg total) by mouth 2 (two) times daily., Disp: 60 tablet, Rfl: 11  POLYETHYLENE GLYCOL 3350 (MIRALAX ORAL), Take 17 g by mouth 2 (two) times a day.  Taking BID, Disp: , Rfl:   RHOPRESSA 0.02 % ophthalmic solution, PLACE 1 DROP INTO THE LEFT EYE EVERY EVENING., Disp: 2.5 mL, Rfl: 11  sars-cov-2, covid-19 pfizer omicron, (PFIZER COVID BIVAL,12Y UP,,PF,) 30 mcg/0.3 mL injection, Inject into the muscle., Disp: 0.3 mL, Rfl: 0  TRAVATAN Z 0.004 % ophthalmic solution, Place 1 drop into the left eye every evening., Disp: 2.5 mL, Rfl: 12    No current facility-administered medications for this visit.  Facility-Administered Medications Ordered in Other Visits:  sodium chloride 0.9% flush 3 mL, 3 mL, Intravenous, PRN, Steve Galarza PA-C  vancomycin in dextrose 5 % 1 gram/250 mL IVPB 1,000 mg, 1,000 mg, Intravenous, On Call Procedure, Vianney Fountain NP              Review of Systems   Constitutional: Negative. Negative for diaphoresis, malaise/fatigue and weight gain.   HENT: Negative.  Negative for hoarse voice.    Eyes: Negative.  Negative for double vision and visual disturbance.   Cardiovascular:  Negative for chest pain, claudication, cyanosis, dyspnea on exertion, irregular heartbeat, leg swelling, near-syncope, orthopnea, palpitations, paroxysmal nocturnal dyspnea and syncope.   Respiratory: Negative.  Negative for cough, shortness of breath and snoring.    Endocrine: Negative.    Hematologic/Lymphatic: Negative.  Negative for bleeding problem. Does not bruise/bleed easily.   Skin: Negative.  Negative for color change and poor wound healing.   Musculoskeletal: Negative.  Negative for muscle cramps, muscle weakness and myalgias.   Gastrointestinal: Negative.  Negative for bloating, abdominal pain, change in bowel habit, diarrhea, heartburn, hematemesis, hematochezia, melena and nausea.   Genitourinary: Negative.    Neurological: Negative.  Negative for excessive daytime sleepiness, dizziness, headaches, light-headedness, loss of balance, numbness and weakness.        HAS HOTTNESS IN BOTH FEET AND ARMS AND HANDS.    Psychiatric/Behavioral: Negative.   Negative for memory loss. The patient does not have insomnia.    Allergic/Immunologic: Negative.  Negative for hives.      Objective:    Physical Exam  Vitals and nursing note reviewed.   Constitutional:       General: She is not in acute distress.     Appearance: She is well-developed. She is not diaphoretic.   HENT:      Head: Normocephalic and atraumatic.      Nose: Nose normal.   Eyes:      General: No scleral icterus.     Conjunctiva/sclera: Conjunctivae normal.   Neck:      Thyroid: No thyromegaly.      Vascular: No JVD.   Cardiovascular:      Rate and Rhythm: Normal rate. Rhythm irregular.      Heart sounds: S1 normal and S2 normal. No murmur heard.    No friction rub. No gallop. No S3 or S4 sounds.   Pulmonary:      Effort: Pulmonary effort is normal. No respiratory distress.      Breath sounds: Normal breath sounds. No stridor. No wheezing or rales.   Chest:      Chest wall: No tenderness.   Abdominal:      General: Bowel sounds are normal. There is no distension.      Palpations: Abdomen is soft. There is no mass.      Tenderness: There is no abdominal tenderness. There is no rebound.   Genitourinary:     Comments: Deferred  Musculoskeletal:         General: No tenderness or deformity. Normal range of motion.      Cervical back: Normal range of motion and neck supple.   Lymphadenopathy:      Cervical: No cervical adenopathy.   Skin:     General: Skin is warm and dry.      Coloration: Skin is not pale.      Findings: No erythema or rash.   Neurological:      Mental Status: She is alert and oriented to person, place, and time.      Motor: No abnormal muscle tone.      Coordination: Coordination normal.   Psychiatric:         Behavior: Behavior normal.         Thought Content: Thought content normal.         Judgment: Judgment normal.     ECG: AF with controlled V rate        Assessment:       1. Persistent atrial fibrillation    2. PAD (peripheral artery disease)    3. Carotid artery calcification, bilateral     4. Calcification of aorta    5. Atrial flutter with rapid ventricular response    6. Atrial fibrillation with RVR         Plan:       89 yoF here for arrhythmia management. She has recurrent, symptomatic, persistent AF. Her monitor results are not back. I offered STACEY/CV which she agreed to. Will see her in 6w. No changes to medication at this time.

## 2023-02-28 NOTE — PROGRESS NOTES
Subjective:    Patient ID:  Shirley Metz is a 89 y.o. female who presents for follow-up of Atrial Fibrillation      89 yoF referred for AF management.     8/17/22: NSR on recent ECGs up until 6/15/22. AFL and AF on ECGs afterwards starting 7/11/22. Event monitor with 100% AF, controlled average V rate. Normal EF. She had AF after a new BP agent 10/19. Her symptoms are mild. She has tried amiodarone in the past which caused side effects. She has history of CAD and has QTc 450s.     9/22: AF 9/2/22 ECG. She continues to have symptomatic AF as well as symptoms on medication. She wishes to pursue ablation.     2/23: PVI/CTI 11/3/22. She had upper lip swelling after her PVI, suspected to be due to trauma from the STACEY/ET intubations. She had some complaints about the overnight care. She was in NSR on ECGs up until 1/5/23. 1/10/23 she was in atypical AFL. She takes lasix 40 mg qd and sometimes at night. Her AF episodes are few and far between based on her home BP and HR checks. She still complains of SOB. She has a lasix plan provided by the HF clinic.     Interval history: Event monitor performed but not ready for assessment. She has continued HF symptoms. She is in AF today    Ablation 11/22:  ·  CTI ablation.  ·  Pulmonary vein isolation.  ·  Intracardiac echo.  ·  3D mapping performed with Ensite.    Echo 7/12/22:  · Concentric remodeling and normal systolic function.  · The estimated ejection fraction is 60%.  · Normal left ventricular diastolic function.  · Normal right ventricular size with normal right ventricular systolic function.    Event monitor 7/22:  - Heart rates varied between 41 and 123 BPM with an average of 77 BPM.  - Predominant rhythm: atrial fibrillation   - Atrial Fibrillation (AF) 100.0 %  - PVC 0.48 %    Past Medical History:  11/29/2018: Anemia  11/28/2017: Anxiety  No date: Arthritis  10/9/2019: Atrial fibrillation with RVR  No date: Back pain  03/29/2018: Basal cell carcinoma       Comment:  left mid back  10/15/2019: Chronic diastolic congestive heart failure  8/8/2016: CKD (chronic kidney disease) stage 3, GFR 30-59 ml/min  No date: Colon polyps      Comment:  reported per patient.   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  No date: General anesthetics causing adverse effect in therapeutic use      Comment:  woke up during surgery and gagged on ET tube  No date: Glaucoma  No date: Glaucoma  7/24/2018: Heart failure with preserved left ventricular function   (HFpEF)  No date: Hyperlipidemia  No date: Hypertension  dry: Macular degeneration  No date: Obesity  4/26/2021: PVD (peripheral vascular disease)  4/26/2021: PVD (peripheral vascular disease)  01/08/2019: Squamous cell carcinoma      Comment:  left shoulder  10/5/2021: Stenosis of carotid artery  No date: Trouble in sleeping  2/24/2021: Type 2 diabetes mellitus without complication, without   long-term current use of insulin  No date: Urinary tract infection    Past Surgical History:  11/3/2022: ABLATION OF ARRHYTHMOGENIC FOCUS FOR ATRIAL FIBRILLATION;   N/A      Comment:  Procedure: Ablation atrial fibrillation;  Surgeon:                Toi Kelly MD;  Location: [facility] EP LAB;  Service:               Cardiology;  Laterality: N/A;  afib, PVI/CTI, RFA, STACEY                (cx if SR), DEDE, ABDIEL orantes, 3 Prep  1938: ADENOIDECTOMY  No date: BREAST BIOPSY; Left      Comment:  1997. benign  1997 approx: BREAST CYST EXCISION; Left  2012 approx: CARPAL TUNNEL RELEASE; Right  1994: CATARACT EXTRACTION; Bilateral  1975: CHOLECYSTECTOMY  08/2015: CORNEAL TRANSPLANT; Bilateral      Comment:  8/2015 - left; Right 4/2016  No date: EYE SURGERY      Comment:  Fuch's Corneal dystrophy - had 2nd operation with Dr. Quintanilla  No date: EYE SURGERY      Comment:  Cataract wIOL  2011: left thumb; Left      Comment:  removed cuboid for arthritis  8/21/2018: REVERSE TOTAL SHOULDER ARTHROPLASTY; Left       Comment:  Procedure: ARTHROPLASTY, SHOULDER, TOTAL, REVERSE;                 Surgeon: Solomon Lujan MD;  Location: [facility] OR;                 Service: Orthopedics;  Laterality: Left;  WITH BICEP                TENODESIS  2017: SKIN CANCER EXCISION; Left      Comment:  BCC removal by Dr. Valadez  05/11/2011: SLT- OS (aka TRABECULOPLASTY); Left      Comment:  repeat 3/14/12  8/21/2018: TENOTOMY; Left      Comment:  Procedure: TENOTOMY;  Surgeon: Solomon Lujan MD;                 Location: [facility] OR;  Service: Orthopedics;  Laterality:                Left;  1938: TONSILLECTOMY  10/10/2019: TREATMENT OF CARDIAC ARRHYTHMIA; N/A      Comment:  Procedure: CARDIOVERSION;  Surgeon: Pete Durán MD;                Location: [facility] CATH LAB;  Service: Cardiology;                 Laterality: N/A;  12/6/2019: TREATMENT OF CARDIAC ARRHYTHMIA; N/A      Comment:  Procedure: CARDIOVERSION;  Surgeon: Samy Castillo MD;                 Location: [facility] CATH LAB;  Service: Cardiology;                 Laterality: N/A;    Social History    Socioeconomic History      Marital status:       Number of children: 3    Tobacco Use      Smoking status: Never      Smokeless tobacco: Never      Tobacco comments: history of passive smoking from her ex-    Substance and Sexual Activity      Alcohol use: Yes        Alcohol/week: 2.0 standard drinks        Types: 2 Standard drinks or equivalent per week        Comment: occ      Drug use: No      Sexual activity: Not Currently        Partners: Male        Birth control/protection: Post-menopausal        Comment: discussed protection for STD's    Other Topics      Concerns:        Are you pregnant or think you may be?: No        Breast-feeding: No    Social History Narrative       x 2,  x 1. Retired artist - previously doing jewelry/wall pieces; ; lives in [facility]; has 3 sons - 52( lives in [place], 54, and 56;exercises minimally - limited due to  back issues. Still drives. Does have a Living Will.    Social Determinants of Health  Physical Activity: Unknown      Days of Exercise per Week: Patient refused      Minutes of Exercise per Session: 10 min  Stress: No Stress Concern Present      Feeling of Stress : Not at all    Review of patient's family history indicates:    Current Outpatient Medications:  acetaminophen (TYLENOL) 500 MG tablet, Take 500 mg by mouth nightly as needed., Disp: , Rfl:   ALPRAZolam (XANAX) 0.5 MG tablet, Take 1 tablet (0.5 mg total) by mouth nightly as needed for Anxiety., Disp: 30 tablet, Rfl: 5  apixaban (ELIQUIS) 2.5 mg Tab, Take 1 tablet (2.5 mg total) by mouth 2 (two) times daily., Disp: 180 tablet, Rfl: 3  b complex vitamins capsule, Take 1 capsule by mouth once daily., Disp: , Rfl:   brinzolamide (AZOPT) 1 % ophthalmic suspension, Place 1 drop into the left eye 2 (two) times a day. (Patient taking differently: Place 1 drop into the left eye every morning.), Disp: 5 mL, Rfl: 12  cholecalciferol, vitamin D3, (VITAMIN D3) 50 mcg (2,000 unit) Cap, Take 1 capsule by mouth once daily., Disp: , Rfl:   coenzyme Q10 200 mg capsule, Take 200 mg by mouth once daily., Disp: , Rfl:    fish oil-omega-3 fatty acids 300-1,000 mg capsule, Take 1 capsule by mouth once daily., Disp: , Rfl:   furosemide (LASIX) 40 MG tablet, Take 1 tablet (40 mg total) by mouth 2 (two) times a day., Disp: 30 tablet, Rfl: 0  losartan (COZAAR) 25 MG tablet, Take 1 tablet (25 mg total) by mouth once daily., Disp: 180 tablet, Rfl: 1  metOLazone (ZAROXOLYN) 2.5 MG tablet, Take 1 tablet (2.5 mg total) by mouth once daily., Disp: 30 tablet, Rfl: 0  metOLazone (ZAROXOLYN) 2.5 MG tablet, Take 1 tablet (2.5 mg total) by mouth twice a week., Disp: 8 tablet, Rfl: 0  metoprolol tartrate (LOPRESSOR) 25 MG tablet, Take 1 tablet (25 mg total) by mouth 2 (two) times daily., Disp: 60 tablet, Rfl: 11  POLYETHYLENE GLYCOL 3350 (MIRALAX ORAL), Take 17 g by mouth 2 (two) times a day.  Taking BID, Disp: , Rfl:   RHOPRESSA 0.02 % ophthalmic solution, PLACE 1 DROP INTO THE LEFT EYE EVERY EVENING., Disp: 2.5 mL, Rfl: 11  sars-cov-2, covid-19 pfizer omicron, (PFIZER COVID BIVAL,12Y UP,,PF,) 30 mcg/0.3 mL injection, Inject into the muscle., Disp: 0.3 mL, Rfl: 0  TRAVATAN Z 0.004 % ophthalmic solution, Place 1 drop into the left eye every evening., Disp: 2.5 mL, Rfl: 12    No current facility-administered medications for this visit.  Facility-Administered Medications Ordered in Other Visits:  sodium chloride 0.9% flush 3 mL, 3 mL, Intravenous, PRN, Steve Galarza PA-C  vancomycin in dextrose 5 % 1 gram/250 mL IVPB 1,000 mg, 1,000 mg, Intravenous, On Call Procedure, Vianney Fountain NP              Review of Systems   Constitutional: Negative. Negative for diaphoresis, malaise/fatigue and weight gain.   HENT: Negative.  Negative for hoarse voice.    Eyes: Negative.  Negative for double vision and visual disturbance.   Cardiovascular:  Negative for chest pain, claudication, cyanosis, dyspnea on exertion, irregular heartbeat, leg swelling, near-syncope, orthopnea, palpitations, paroxysmal nocturnal dyspnea and syncope.   Respiratory: Negative.  Negative for cough, shortness of breath and snoring.    Endocrine: Negative.    Hematologic/Lymphatic: Negative.  Negative for bleeding problem. Does not bruise/bleed easily.   Skin: Negative.  Negative for color change and poor wound healing.   Musculoskeletal: Negative.  Negative for muscle cramps, muscle weakness and myalgias.   Gastrointestinal: Negative.  Negative for bloating, abdominal pain, change in bowel habit, diarrhea, heartburn, hematemesis, hematochezia, melena and nausea.   Genitourinary: Negative.    Neurological: Negative.  Negative for excessive daytime sleepiness, dizziness, headaches, light-headedness, loss of balance, numbness and weakness.        HAS HOTTNESS IN BOTH FEET AND ARMS AND HANDS.    Psychiatric/Behavioral: Negative.   Negative for memory loss. The patient does not have insomnia.    Allergic/Immunologic: Negative.  Negative for hives.      Objective:    Physical Exam  Vitals and nursing note reviewed.   Constitutional:       General: She is not in acute distress.     Appearance: She is well-developed. She is not diaphoretic.   HENT:      Head: Normocephalic and atraumatic.      Nose: Nose normal.   Eyes:      General: No scleral icterus.     Conjunctiva/sclera: Conjunctivae normal.   Neck:      Thyroid: No thyromegaly.      Vascular: No JVD.   Cardiovascular:      Rate and Rhythm: Normal rate. Rhythm irregular.      Heart sounds: S1 normal and S2 normal. No murmur heard.    No friction rub. No gallop. No S3 or S4 sounds.   Pulmonary:      Effort: Pulmonary effort is normal. No respiratory distress.      Breath sounds: Normal breath sounds. No stridor. No wheezing or rales.   Chest:      Chest wall: No tenderness.   Abdominal:      General: Bowel sounds are normal. There is no distension.      Palpations: Abdomen is soft. There is no mass.      Tenderness: There is no abdominal tenderness. There is no rebound.   Genitourinary:     Comments: Deferred  Musculoskeletal:         General: No tenderness or deformity. Normal range of motion.      Cervical back: Normal range of motion and neck supple.   Lymphadenopathy:      Cervical: No cervical adenopathy.   Skin:     General: Skin is warm and dry.      Coloration: Skin is not pale.      Findings: No erythema or rash.   Neurological:      Mental Status: She is alert and oriented to person, place, and time.      Motor: No abnormal muscle tone.      Coordination: Coordination normal.   Psychiatric:         Behavior: Behavior normal.         Thought Content: Thought content normal.         Judgment: Judgment normal.     ECG: AF with controlled V rate        Assessment:       1. Persistent atrial fibrillation    2. PAD (peripheral artery disease)    3. Carotid artery calcification, bilateral     4. Calcification of aorta    5. Atrial flutter with rapid ventricular response    6. Atrial fibrillation with RVR         Plan:       89 yoF here for arrhythmia management. She has recurrent, symptomatic, persistent AF. Her monitor results are not back. I offered STACEY/CV which she agreed to. Will see her in 6w. No changes to medication at this time.

## 2023-03-01 ENCOUNTER — HOSPITAL ENCOUNTER (OUTPATIENT)
Dept: CARDIOLOGY | Facility: HOSPITAL | Age: 88
Discharge: HOME | End: 2023-03-01
Attending: INTERNAL MEDICINE
Payer: MEDICARE

## 2023-03-01 ENCOUNTER — OFFICE VISIT (OUTPATIENT)
Dept: CARDIOLOGY | Facility: CLINIC | Age: 88
End: 2023-03-01
Payer: MEDICARE

## 2023-03-01 ENCOUNTER — TELEPHONE (OUTPATIENT)
Dept: CARDIOLOGY | Facility: CLINIC | Age: 88
End: 2023-03-01

## 2023-03-01 VITALS
DIASTOLIC BLOOD PRESSURE: 78 MMHG | OXYGEN SATURATION: 95 % | HEART RATE: 96 BPM | BODY MASS INDEX: 30.22 KG/M2 | WEIGHT: 177 LBS | HEIGHT: 64 IN | SYSTOLIC BLOOD PRESSURE: 134 MMHG | RESPIRATION RATE: 16 BRPM

## 2023-03-01 DIAGNOSIS — I48.0 PAROXYSMAL ATRIAL FIBRILLATION: ICD-10-CM

## 2023-03-01 DIAGNOSIS — I70.0 CALCIFICATION OF AORTA: Chronic | ICD-10-CM

## 2023-03-01 DIAGNOSIS — I50.33 ACUTE ON CHRONIC DIASTOLIC CONGESTIVE HEART FAILURE: ICD-10-CM

## 2023-03-01 DIAGNOSIS — I48.0 PAROXYSMAL ATRIAL FIBRILLATION: Primary | ICD-10-CM

## 2023-03-01 DIAGNOSIS — I48.92 ATRIAL FLUTTER WITH RAPID VENTRICULAR RESPONSE: ICD-10-CM

## 2023-03-01 DIAGNOSIS — I65.23 CAROTID ARTERY CALCIFICATION, BILATERAL: ICD-10-CM

## 2023-03-01 DIAGNOSIS — I73.9 PAD (PERIPHERAL ARTERY DISEASE): ICD-10-CM

## 2023-03-01 DIAGNOSIS — I48.91 ATRIAL FIBRILLATION WITH RVR: ICD-10-CM

## 2023-03-01 DIAGNOSIS — I48.19 PERSISTENT ATRIAL FIBRILLATION: Primary | ICD-10-CM

## 2023-03-01 PROCEDURE — 1160F RVW MEDS BY RX/DR IN RCRD: CPT | Mod: HCNC,CPTII,S$GLB, | Performed by: INTERNAL MEDICINE

## 2023-03-01 PROCEDURE — 99999 PR PBB SHADOW E&M-EST. PATIENT-LVL IV: CPT | Mod: PBBFAC,HCNC,, | Performed by: INTERNAL MEDICINE

## 2023-03-01 PROCEDURE — 99214 PR OFFICE/OUTPT VISIT, EST, LEVL IV, 30-39 MIN: ICD-10-PCS | Mod: HCNC,S$GLB,, | Performed by: INTERNAL MEDICINE

## 2023-03-01 PROCEDURE — 1159F MED LIST DOCD IN RCRD: CPT | Mod: HCNC,CPTII,S$GLB, | Performed by: INTERNAL MEDICINE

## 2023-03-01 PROCEDURE — 99214 OFFICE O/P EST MOD 30 MIN: CPT | Mod: HCNC,S$GLB,, | Performed by: INTERNAL MEDICINE

## 2023-03-01 PROCEDURE — 93010 ELECTROCARDIOGRAM REPORT: CPT | Mod: HCNC,,, | Performed by: INTERNAL MEDICINE

## 2023-03-01 PROCEDURE — 93010 EKG 12-LEAD: ICD-10-PCS | Mod: HCNC,,, | Performed by: INTERNAL MEDICINE

## 2023-03-01 PROCEDURE — 99999 PR PBB SHADOW E&M-EST. PATIENT-LVL IV: ICD-10-PCS | Mod: PBBFAC,HCNC,, | Performed by: INTERNAL MEDICINE

## 2023-03-01 PROCEDURE — 93005 ELECTROCARDIOGRAM TRACING: CPT | Mod: HCNC

## 2023-03-01 PROCEDURE — 1160F PR REVIEW ALL MEDS BY PRESCRIBER/CLIN PHARMACIST DOCUMENTED: ICD-10-PCS | Mod: HCNC,CPTII,S$GLB, | Performed by: INTERNAL MEDICINE

## 2023-03-01 PROCEDURE — 1159F PR MEDICATION LIST DOCUMENTED IN MEDICAL RECORD: ICD-10-PCS | Mod: HCNC,CPTII,S$GLB, | Performed by: INTERNAL MEDICINE

## 2023-03-01 NOTE — Clinical Note
I saw Mrs Bettencourt today and advised she undergo STACEY/CV. Pete, she asked that you perform the procedure if that is okay with you. I'll see her in follow up afterwards. Thank you

## 2023-03-02 ENCOUNTER — TELEPHONE (OUTPATIENT)
Dept: PRIMARY CARE CLINIC | Facility: CLINIC | Age: 88
End: 2023-03-02
Payer: MEDICARE

## 2023-03-02 NOTE — TELEPHONE ENCOUNTER
----- Message from Heather Miller NP sent at 3/2/2023  8:59 AM CST -----  Renal fx worse. Please schedule f/u to address.

## 2023-03-03 ENCOUNTER — OFFICE VISIT (OUTPATIENT)
Dept: PRIMARY CARE CLINIC | Facility: CLINIC | Age: 88
End: 2023-03-03
Payer: MEDICARE

## 2023-03-03 VITALS
WEIGHT: 177.81 LBS | HEART RATE: 85 BPM | HEIGHT: 64 IN | BODY MASS INDEX: 30.35 KG/M2 | DIASTOLIC BLOOD PRESSURE: 58 MMHG | OXYGEN SATURATION: 95 % | SYSTOLIC BLOOD PRESSURE: 121 MMHG | TEMPERATURE: 99 F

## 2023-03-03 DIAGNOSIS — N19 HYPERTENSIVE HEART AND RENAL DISEASE WITH BOTH (CONGESTIVE) HEART FAILURE AND RENAL FAILURE: ICD-10-CM

## 2023-03-03 DIAGNOSIS — I50.33 ACUTE ON CHRONIC DIASTOLIC CONGESTIVE HEART FAILURE: ICD-10-CM

## 2023-03-03 DIAGNOSIS — I13.2 HYPERTENSIVE HEART AND RENAL DISEASE WITH BOTH (CONGESTIVE) HEART FAILURE AND RENAL FAILURE: ICD-10-CM

## 2023-03-03 DIAGNOSIS — I48.91 NEW ONSET A-FIB: ICD-10-CM

## 2023-03-03 PROCEDURE — 99999 PR PBB SHADOW E&M-EST. PATIENT-LVL V: ICD-10-PCS | Mod: PBBFAC,HCNC,, | Performed by: NURSE PRACTITIONER

## 2023-03-03 PROCEDURE — 3288F FALL RISK ASSESSMENT DOCD: CPT | Mod: HCNC,CPTII,S$GLB, | Performed by: NURSE PRACTITIONER

## 2023-03-03 PROCEDURE — 99214 OFFICE O/P EST MOD 30 MIN: CPT | Mod: HCNC,S$GLB,, | Performed by: NURSE PRACTITIONER

## 2023-03-03 PROCEDURE — 99214 PR OFFICE/OUTPT VISIT, EST, LEVL IV, 30-39 MIN: ICD-10-PCS | Mod: HCNC,S$GLB,, | Performed by: NURSE PRACTITIONER

## 2023-03-03 PROCEDURE — 1160F PR REVIEW ALL MEDS BY PRESCRIBER/CLIN PHARMACIST DOCUMENTED: ICD-10-PCS | Mod: HCNC,CPTII,S$GLB, | Performed by: NURSE PRACTITIONER

## 2023-03-03 PROCEDURE — 1126F AMNT PAIN NOTED NONE PRSNT: CPT | Mod: HCNC,CPTII,S$GLB, | Performed by: NURSE PRACTITIONER

## 2023-03-03 PROCEDURE — 1101F PT FALLS ASSESS-DOCD LE1/YR: CPT | Mod: HCNC,CPTII,S$GLB, | Performed by: NURSE PRACTITIONER

## 2023-03-03 PROCEDURE — 1126F PR PAIN SEVERITY QUANTIFIED, NO PAIN PRESENT: ICD-10-PCS | Mod: HCNC,CPTII,S$GLB, | Performed by: NURSE PRACTITIONER

## 2023-03-03 PROCEDURE — 99999 PR PBB SHADOW E&M-EST. PATIENT-LVL V: CPT | Mod: PBBFAC,HCNC,, | Performed by: NURSE PRACTITIONER

## 2023-03-03 PROCEDURE — 1159F PR MEDICATION LIST DOCUMENTED IN MEDICAL RECORD: ICD-10-PCS | Mod: HCNC,CPTII,S$GLB, | Performed by: NURSE PRACTITIONER

## 2023-03-03 PROCEDURE — 3288F PR FALLS RISK ASSESSMENT DOCUMENTED: ICD-10-PCS | Mod: HCNC,CPTII,S$GLB, | Performed by: NURSE PRACTITIONER

## 2023-03-03 PROCEDURE — 1101F PR PT FALLS ASSESS DOC 0-1 FALLS W/OUT INJ PAST YR: ICD-10-PCS | Mod: HCNC,CPTII,S$GLB, | Performed by: NURSE PRACTITIONER

## 2023-03-03 PROCEDURE — 1159F MED LIST DOCD IN RCRD: CPT | Mod: HCNC,CPTII,S$GLB, | Performed by: NURSE PRACTITIONER

## 2023-03-03 PROCEDURE — 1160F RVW MEDS BY RX/DR IN RCRD: CPT | Mod: HCNC,CPTII,S$GLB, | Performed by: NURSE PRACTITIONER

## 2023-03-03 NOTE — PATIENT INSTRUCTIONS
Monitor daily weight, heart rate, blood pressure.    Call me ASAP if symptoms return.     If weight increase more than 2 lb/day or 5 lb/week notify us ASAP.

## 2023-03-03 NOTE — PROGRESS NOTES
Shirley Metz  03/06/2023  [id number]    Phylicia Deleon MD  Patient Care Team:  Phylicia Deleon MD as PCP - General (Internal Medicine)  Wilbert Ware MD as Consulting Physician (Ophthalmology)  Rajinder Quintanilla MD as Consulting Physician (Ophthalmology)  Suzan Gregorio PA-C as Physician Assistant (Rheumatology)  Rosario Valadez MD as Consulting Physician (Dermatology)  Trevon Ramirez MD as Consulting Physician (Cardiology)  Amish Mendoza MD (Pulmonary Disease)  Jaquan Choi MD as Consulting Physician (Vascular Surgery)  Debbie Choi MD (Inactive) as Consulting Physician (Gynecology)  Emmanuel Fish MD as Consulting Physician (Hand Surgery)  PAXTON Chaidez Jr., MD as Consulting Physician (Orthopedic Surgery)  Phylicia Deleon MD as Physician (Internal Medicine)  Heather Miller NP as Nurse Practitioner (Family Medicine)      [facility] Note      Chief Complaint:  Chief Complaint   Patient presents with    Discuss lab work        History of Present Illness:  HPI    Dyspnea, heart failure follow up.  Metolazone increased to qd 2/24 for persistent RUIZ, rales, pulm edema on CXR. Resumed 3 twice weekly dosing 2/27. Creatinine 2.6 3/1.     Appt Dr Kelly 3/1, no med change recommendations. Monitor reading not available. STACEY with cardioversion scheduled 3/21.     Low BP yesterday, 83/46. Later 105/47. At baseline this AM. HR normal.     Feels much better than last week. Still RUIZ but this is much better than last week.   Decreased edema to BLEs.     Lost 10 lbs in the past week. Appetite had been very poor but improving some. No palpitations.         Review of Systems   Constitutional:  Negative for chills and fever.   Respiratory:  Negative for cough and shortness of breath (improving, none presently).    Cardiovascular:  Negative for chest pain, palpitations and leg swelling.   Gastrointestinal:  Negative for abdominal pain, diarrhea, heartburn, nausea and  vomiting.   Genitourinary:  Negative for dysuria.   Musculoskeletal:  Negative for myalgias.   Neurological:  Negative for dizziness and headaches.   Psychiatric/Behavioral:  Negative for depression. The patient is not nervous/anxious.        The following were reviewed: Active problem list, medication list, allergies, family history, social history, and Health Maintenance.     History:  Past Medical History:   Diagnosis Date    Anemia 11/29/2018    Anxiety 11/28/2017    Arthritis     Atrial fibrillation with RVR 10/9/2019    Back pain     Basal cell carcinoma 03/29/2018    left mid back    Chronic diastolic congestive heart failure 10/15/2019    CKD (chronic kidney disease) stage 3, GFR 30-59 ml/min 8/8/2016    Colon polyps     reported per patient.     Coronary artery calcification 10/5/2021    Fuchs' corneal dystrophy     General anesthetics causing adverse effect in therapeutic use     woke up during surgery and gagged on ET tube    Glaucoma     Glaucoma     Heart failure with preserved left ventricular function (HFpEF) 7/24/2018    Hyperlipidemia     Hypertension     Macular degeneration dry    Obesity     PVD (peripheral vascular disease) 4/26/2021    PVD (peripheral vascular disease) 4/26/2021    Squamous cell carcinoma 01/08/2019    left shoulder    Stenosis of carotid artery 10/5/2021    Trouble in sleeping     Type 2 diabetes mellitus without complication, without long-term current use of insulin 2/24/2021    Urinary tract infection      Past Surgical History:   Procedure Laterality Date    ABLATION OF ARRHYTHMOGENIC FOCUS FOR ATRIAL FIBRILLATION N/A 11/3/2022    Procedure: Ablation atrial fibrillation;  Surgeon: Toi Kelly MD;  Location: [facility];  Service: Cardiology;  Laterality: N/A;  afib, PVI/CTI, RFA, STACEY (cx if SR), DEDE, ABDIEL orantes, 3 Prep    ADENOIDECTOMY  1938    BREAST BIOPSY Left     1997. benign    BREAST CYST EXCISION Left 1997 approx    CARPAL TUNNEL RELEASE Right 2012 approx     CATARACT EXTRACTION Bilateral 1994    CHOLECYSTECTOMY  1975    CORNEAL TRANSPLANT Bilateral 08/2015 8/2015 - left; Right 4/2016    EYE SURGERY      Fuch's Corneal dystrophy - had 2nd operation with Dr. Quintanilla    EYE SURGERY      Cataract wIOL    left thumb Left 2011    removed cuboid for arthritis    REVERSE TOTAL SHOULDER ARTHROPLASTY Left 8/21/2018    Procedure: ARTHROPLASTY, SHOULDER, TOTAL, REVERSE;  Surgeon: Solomon Lujan MD;  Location: [facility];  Service: Orthopedics;  Laterality: Left;  WITH BICEP TENODESIS    SKIN CANCER EXCISION Left 2017    BCC removal by Dr. Valadez    SLT- OS (aka TRABECULOPLASTY) Left 05/11/2011    repeat 3/14/12    TENOTOMY Left 8/21/2018    Procedure: TENOTOMY;  Surgeon: Solomon Lujan MD;  Location: [facility];  Service: Orthopedics;  Laterality: Left;    TONSILLECTOMY  1938    TREATMENT OF CARDIAC ARRHYTHMIA N/A 10/10/2019    Procedure: CARDIOVERSION;  Surgeon: Pete Durán MD;  Location: [facility];  Service: Cardiology;  Laterality: N/A;    TREATMENT OF CARDIAC ARRHYTHMIA N/A 12/6/2019    Procedure: CARDIOVERSION;  Surgeon: Samy Castillo MD;  Location: [facility];  Service: Cardiology;  Laterality: N/A;     Family History   Problem Relation Age of Onset    Heart disease Mother     Hypertension Mother     Cancer Father 88        sarcoma( ?Kaposi's ) on leg    Deep vein thrombosis Neg Hx     Ovarian cancer Neg Hx     Breast cancer Neg Hx     Kidney disease Neg Hx     Strabismus Neg Hx     Retinal detachment Neg Hx     Macular degeneration Neg Hx     Glaucoma Neg Hx     Blindness Neg Hx     Amblyopia Neg Hx     Eczema Neg Hx     Lupus Neg Hx     Melanoma Neg Hx     Psoriasis Neg Hx     Diabetes Neg Hx     Stroke Neg Hx     Mental retardation Neg Hx     Mental illness Neg Hx     Hyperlipidemia Neg Hx     COPD Neg Hx     Asthma Neg Hx     Depression Neg Hx     Alcohol abuse Neg Hx     Drug abuse Neg Hx      Social History     Socioeconomic History    Marital status:      Number of children: 3   Tobacco Use    Smoking status: Never    Smokeless tobacco: Never    Tobacco comments:     history of passive smoking from her ex-   Substance and Sexual Activity    Alcohol use: Yes     Alcohol/week: 2.0 standard drinks     Types: 2 Standard drinks or equivalent per week     Comment: occ    Drug use: No    Sexual activity: Not Currently     Partners: Male     Birth control/protection: Post-menopausal     Comment: discussed protection for STD's   Other Topics Concern    Are you pregnant or think you may be? No    Breast-feeding No   Social History Narrative     x 2,  x 1. Retired artist - previously doing jewelry/wall pieces; ; lives in [facility]; has 3 sons - 52( lives in [place], 54, and 56;exercises minimally - limited due to back issues. Still drives. Does have a Living Will.     Social Determinants of Health     Physical Activity: Unknown    Days of Exercise per Week: Patient refused    Minutes of Exercise per Session: 10 min   Stress: No Stress Concern Present    Feeling of Stress : Not at all     Patient Active Problem List   Diagnosis    HTN (hypertension)    Mixed hyperlipidemia    Primary osteoarthritis involving multiple joints    Macular degeneration - Both Eyes    Fuch's endothelial dystrophy - Both Eyes    Lens replaced by other means    COAG (chronic open-angle glaucoma) - Both Eyes    Blepharitis, unspecified - Both Eyes    Shortness of breath    Abnormal ECG    PVC's (premature ventricular contractions)    Other microscopic hematuria    LVH (left ventricular hypertrophy) due to hypertensive disease    Osteopenia of hip    Myalgia    Calcification of aorta    Neuropathy    Unequal blood pressures in paired extremities    Medication side effects    History of cornea transplant    Pseudophakia    Insomnia    Anxiety    Pre-diabetes    Obesity (BMI 30.0-34.9)    Elevated troponin    Bilateral shoulder region arthritis     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    Anemia    New onset a-fib    Thrombus of left atrial appendage    Persistent atrial fibrillation    Elevated liver enzymes    Atrial flutter with rapid ventricular response    Chronic anticoagulation    Keratitis sicca, bilateral    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    PAD (peripheral artery disease)    Statin intolerance    Pulmonary nodules/lesions, multiple    Spinal stenosis at L4-L5 level    Pulmonary granuloma    Lumbar spondylosis    Bilateral recurrent inguinal hernia without obstruction or gangrene    LLQ abdominal pain    Pain in left leg    Rectal abnormality    Stenosis of carotid artery    Carotid artery calcification, bilateral    B12 deficiency    Seasonal allergic rhinitis    Atrial fibrillation with RVR    Elevated serum creatinine    Coagulopathy    Atheroma of artery    Acute on chronic diastolic congestive heart failure    Troponin level elevated     Review of patient's allergies indicates:   Allergen Reactions    Carvedilol Swelling     Lip swelling    Cephalexin Other (See Comments)     Muscle/joint weakness unable to move     Doxycycline Shortness Of Breath, Nausea And Vomiting and Other (See Comments)     weakness    Erythromycin Other (See Comments)     Complete weakness/could not walk    Gadolinium-containing contrast media Swelling     angioedema    Hydrocodone-acetaminophen Shortness Of Breath     wheezing    Inveltys [loteprednol etabonate] Palpitations and Other (See Comments)     Patient stated bp went up.    Labetalol Shortness Of Breath, Nausea Only, Palpitations and Other (See Comments)     weakness    Loratadine Other (See Comments)     Double vision/spots  Other reaction(s): double vision    Naproxen      wheezing  wheezing  wheezing  Other reaction(s): wheezing    Statins-hmg-coa reductase inhibitors Other (See Comments)     Severe Muscle weakness  Muscle weakness  Severe Muscle  weakness  Other reaction(s): severe muscle weakness    Amlodipine Other (See Comments)     Myalgias    Fenofibrate Other (See Comments)     muscle weakness      Hydralazine Other (See Comments)     muscle tremors    Hydralazine analogues Other (See Comments)     Muscle tremors/aches      Loteprednol Other (See Comments)     increased BP    Macrolide antibiotics Other (See Comments)     Complete weakness/could not walk      Moxifloxacin Hives and Other (See Comments)     muscle soreness    Timolol Other (See Comments)     Blurred vision, Burning eyes, Severe muscle weakness, eye problems.      Verapamil Diarrhea     Severe diarrhea.      Hydrocortisone     Isosorbide      Tolerates Imdur    Tetanus and diphtheria toxoids     Adhesive Rash     Clonidine patch--contact dermatitis    Codeine Diarrhea and Other (See Comments)     Loss of balance       Doxazosin Palpitations    Fexofenadine Other (See Comments)     Double vision/spots       Hydrocortisone (bulk) Other (See Comments)     Only suppository/ caused muscle weakness    Latanoprost Other (See Comments)     Muscle weakness      Olopatadine Other (See Comments)     Muscle weakness      Prednisone Anxiety       Medications:  Current Outpatient Medications on File Prior to Visit   Medication Sig Dispense Refill    acetaminophen (TYLENOL) 500 MG tablet Take 500 mg by mouth nightly as needed.      ALPRAZolam (XANAX) 0.5 MG tablet Take 1 tablet (0.5 mg total) by mouth nightly as needed for Anxiety. 30 tablet 5    apixaban (ELIQUIS) 2.5 mg Tab Take 1 tablet (2.5 mg total) by mouth 2 (two) times daily. 180 tablet 3    b complex vitamins capsule Take 1 capsule by mouth once daily.      brinzolamide (AZOPT) 1 % ophthalmic suspension Place 1 drop into the left eye 2 (two) times a day. (Patient taking differently: Place 1 drop into the left eye every morning.) 5 mL 12    cholecalciferol, vitamin D3, (VITAMIN D3) 50 mcg (2,000 unit) Cap Take 1 capsule by mouth once daily.       coenzyme Q10 200 mg capsule Take 200 mg by mouth once daily.      fish oil-omega-3 fatty acids 300-1,000 mg capsule Take 1 capsule by mouth once daily.      furosemide (LASIX) 40 MG tablet Take 1 tablet (40 mg total) by mouth 2 (two) times a day. 30 tablet 0    losartan (COZAAR) 25 MG tablet Take 1 tablet (25 mg total) by mouth once daily. 180 tablet 1    metOLazone (ZAROXOLYN) 2.5 MG tablet Take 1 tablet (2.5 mg total) by mouth twice a week. 8 tablet 0    metoprolol tartrate (LOPRESSOR) 25 MG tablet Take 1 tablet (25 mg total) by mouth 2 (two) times daily. 60 tablet 11    POLYETHYLENE GLYCOL 3350 (MIRALAX ORAL) Take 17 g by mouth 2 (two) times a day. Taking BID      RHOPRESSA 0.02 % ophthalmic solution PLACE 1 DROP INTO THE LEFT EYE EVERY EVENING. 2.5 mL 11    sars-cov-2, covid-19 pfizer omicron, (PFIZER COVID BIVAL,12Y UP,,PF,) 30 mcg/0.3 mL injection Inject into the muscle. 0.3 mL 0    TRAVATAN Z 0.004 % ophthalmic solution Place 1 drop into the left eye every evening. 2.5 mL 12    [DISCONTINUED] cloNIDine (CATAPRES) 0.1 MG tablet Take 1 tablet (0.1 mg total) by mouth 2 (two) times daily as needed (systolic BP over 180). 20 tablet 1    [DISCONTINUED] isosorbide mononitrate (IMDUR) 30 MG 24 hr tablet Take 1 tablet (30 mg total) by mouth every evening. 90 tablet 1     Current Facility-Administered Medications on File Prior to Visit   Medication Dose Route Frequency Provider Last Rate Last Admin    sodium chloride 0.9% flush 3 mL  3 mL Intravenous PRN Steve Galarza PA-C        vancomycin in dextrose 5 % 1 gram/250 mL IVPB 1,000 mg  1,000 mg Intravenous On Call Procedure Vianney Fountain NP           Medications have been reviewed and reconciled with patient at visit today.    Barriers to medications present (no )    Fall since last office visit (no )      Exam:  Vitals:    03/03/23 1438   BP: (!) 121/58   Pulse: 85   Temp: 98.6 °F (37 °C)     Weight: 80.6 kg (177 lb 12.8 oz)   Body mass index is 30.52  kg/m².      BP Readings from Last 3 Encounters:   03/03/23 (!) 121/58   03/01/23 134/78   02/24/23 125/69     Wt Readings from Last 3 Encounters:   03/03/23 1438 80.6 kg (177 lb 12.8 oz)   03/01/23 1011 80.3 kg (177 lb 0.5 oz)   02/24/23 1444 82.9 kg (182 lb 12.8 oz)          Physical Exam  Constitutional:       General: She is not in acute distress.  HENT:      Mouth/Throat:      Mouth: Mucous membranes are moist.   Eyes:      General: No scleral icterus.  Cardiovascular:      Rate and Rhythm: Normal rate. Rhythm irregular.   Pulmonary:      Effort: Pulmonary effort is normal.      Breath sounds: Normal breath sounds.   Abdominal:      General: There is no distension.      Palpations: Abdomen is soft.      Tenderness: There is no abdominal tenderness.   Musculoskeletal:         General: No swelling or deformity.      Right lower leg: No edema.      Left lower leg: No edema.   Neurological:      Mental Status: She is alert. Mental status is at baseline.   Psychiatric:         Mood and Affect: Mood normal.         Behavior: Behavior normal.         Thought Content: Thought content normal.       Laboratory Reviewed: (Yes)  Lab Results   Component Value Date    WBC 6.63 01/12/2023    HGB 11.5 (L) 01/12/2023    HCT 35.6 (L) 01/12/2023     01/12/2023    CHOL 168 08/31/2022    TRIG 98 08/31/2022    HDL 43 08/31/2022    ALT 16 01/12/2023    AST 21 01/12/2023     03/01/2023    K 3.5 03/01/2023    CL 99 03/01/2023    CREATININE 2.6 (H) 03/01/2023    BUN 59 (H) 03/01/2023    CO2 31 (H) 03/01/2023    TSH 3.826 08/31/2022    INR 1.1 10/25/2022    HGBA1C 6.2 (H) 08/31/2022           Health Maintenance  Health Maintenance Topics with due status: Not Due       Topic Last Completion Date    Hemoglobin A1c (Prediabetes) 08/31/2022    Lipid Panel 08/31/2022     Health Maintenance Due   Topic Date Due    Shingles Vaccine (1 of 2) Never done    TETANUS VACCINE  11/09/2021    Pneumococcal Vaccines (Age 65+) (2 - PCV)  03/16/2023       Assessment:  Problem List Items Addressed This Visit          Cardiac/Vascular    New onset a-fib     Rate controlled. Has CV with STACEY scheduled.         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 To continue furosemide, metolazone weekly, monitor weights closely. F/U next week with RFP then.           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 Patient expressed understanding. Patient was given the opportunity to ask questions and be an active participant in their medical care. Patient had no further questions or concerns at this time.   -Documentation of patient's health and condition was obtained from family member who was present during visit.  -Patient is an overall moderate risk for health complications from their medical conditions.       Follow up: Follow up in about 1 week (around 3/10/2023).      After visit summary printed and given to patient upon discharge.  Patient goals and care plan are included in After visit summary.    Total medical decision making time was 39 min.  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    Health maintenance needs, recent test results and goals of care discussed with pt and questions answered.

## 2023-03-07 ENCOUNTER — TELEPHONE (OUTPATIENT)
Dept: PRIMARY CARE CLINIC | Facility: CLINIC | Age: 88
End: 2023-03-07
Payer: MEDICARE

## 2023-03-07 NOTE — TELEPHONE ENCOUNTER
109/60 8:00am yesterday  110/73 noon  97/49 8:30pm    Pt states she felt good yesterday.  Went grocery shopping today and feels ok today.     116/73 8:00 am today

## 2023-03-10 DIAGNOSIS — I50.33 ACUTE ON CHRONIC DIASTOLIC CONGESTIVE HEART FAILURE: ICD-10-CM

## 2023-03-10 RX ORDER — FUROSEMIDE 40 MG/1
TABLET ORAL
Qty: 45 TABLET | Refills: 5 | Status: SHIPPED | OUTPATIENT
Start: 2023-03-10 | End: 2023-03-27 | Stop reason: SDUPTHER

## 2023-03-10 NOTE — PROGRESS NOTES
Breathing much better this week. Systolic BP in 90s several times this week. Weight is unchanged the past week. HR 70s-90s. Changed metolazone to q Saturday. Taking Lasix 40 mg BID.     Will stop metolazone and change PM Lasix to 20 mg.   Home wt 170 lbs.

## 2023-03-16 DIAGNOSIS — I48.0 PAF (PAROXYSMAL ATRIAL FIBRILLATION): Chronic | ICD-10-CM

## 2023-03-16 RX ORDER — METOPROLOL TARTRATE 25 MG/1
25 TABLET, FILM COATED ORAL 2 TIMES DAILY
Qty: 180 TABLET | Refills: 3 | Status: SHIPPED | OUTPATIENT
Start: 2023-03-16 | End: 2023-04-03

## 2023-03-17 ENCOUNTER — OFFICE VISIT (OUTPATIENT)
Dept: PRIMARY CARE CLINIC | Facility: CLINIC | Age: 88
End: 2023-03-17
Payer: MEDICARE

## 2023-03-17 ENCOUNTER — TELEPHONE (OUTPATIENT)
Dept: PRIMARY CARE CLINIC | Facility: CLINIC | Age: 88
End: 2023-03-17

## 2023-03-17 VITALS
BODY MASS INDEX: 31.03 KG/M2 | WEIGHT: 180.81 LBS | DIASTOLIC BLOOD PRESSURE: 61 MMHG | OXYGEN SATURATION: 91 % | TEMPERATURE: 98 F | SYSTOLIC BLOOD PRESSURE: 106 MMHG | HEART RATE: 74 BPM

## 2023-03-17 DIAGNOSIS — N19 HYPERTENSIVE HEART AND RENAL DISEASE WITH BOTH (CONGESTIVE) HEART FAILURE AND RENAL FAILURE: ICD-10-CM

## 2023-03-17 DIAGNOSIS — E55.9 VITAMIN D DEFICIENCY: ICD-10-CM

## 2023-03-17 DIAGNOSIS — I13.2 HYPERTENSIVE HEART AND RENAL DISEASE WITH BOTH (CONGESTIVE) HEART FAILURE AND RENAL FAILURE: ICD-10-CM

## 2023-03-17 DIAGNOSIS — I50.33 ACUTE ON CHRONIC DIASTOLIC CONGESTIVE HEART FAILURE: ICD-10-CM

## 2023-03-17 DIAGNOSIS — R73.03 PREDIABETES: ICD-10-CM

## 2023-03-17 DIAGNOSIS — I12.9 CKD STAGE 4 SECONDARY TO HYPERTENSION: Primary | ICD-10-CM

## 2023-03-17 DIAGNOSIS — N18.4 CKD STAGE 4 SECONDARY TO HYPERTENSION: Primary | ICD-10-CM

## 2023-03-17 DIAGNOSIS — I48.91 ATRIAL FIBRILLATION WITH RVR: ICD-10-CM

## 2023-03-17 LAB
ALBUMIN/CREAT UR: 194.3 UG/MG (ref 0–30)
CREAT UR-MCNC: 53 MG/DL (ref 15–325)
MICROALBUMIN UR DL<=1MG/L-MCNC: 103 UG/ML

## 2023-03-17 PROCEDURE — 99214 PR OFFICE/OUTPT VISIT, EST, LEVL IV, 30-39 MIN: ICD-10-PCS | Mod: HCNC,S$GLB,, | Performed by: NURSE PRACTITIONER

## 2023-03-17 PROCEDURE — 99214 OFFICE O/P EST MOD 30 MIN: CPT | Mod: HCNC,S$GLB,, | Performed by: NURSE PRACTITIONER

## 2023-03-17 PROCEDURE — 1101F PT FALLS ASSESS-DOCD LE1/YR: CPT | Mod: HCNC,CPTII,S$GLB, | Performed by: NURSE PRACTITIONER

## 2023-03-17 PROCEDURE — 99999 PR PBB SHADOW E&M-EST. PATIENT-LVL IV: ICD-10-PCS | Mod: PBBFAC,HCNC,, | Performed by: NURSE PRACTITIONER

## 2023-03-17 PROCEDURE — 1101F PR PT FALLS ASSESS DOC 0-1 FALLS W/OUT INJ PAST YR: ICD-10-PCS | Mod: HCNC,CPTII,S$GLB, | Performed by: NURSE PRACTITIONER

## 2023-03-17 PROCEDURE — 1125F AMNT PAIN NOTED PAIN PRSNT: CPT | Mod: HCNC,CPTII,S$GLB, | Performed by: NURSE PRACTITIONER

## 2023-03-17 PROCEDURE — 1159F MED LIST DOCD IN RCRD: CPT | Mod: HCNC,CPTII,S$GLB, | Performed by: NURSE PRACTITIONER

## 2023-03-17 PROCEDURE — 3288F FALL RISK ASSESSMENT DOCD: CPT | Mod: HCNC,CPTII,S$GLB, | Performed by: NURSE PRACTITIONER

## 2023-03-17 PROCEDURE — 1159F PR MEDICATION LIST DOCUMENTED IN MEDICAL RECORD: ICD-10-PCS | Mod: HCNC,CPTII,S$GLB, | Performed by: NURSE PRACTITIONER

## 2023-03-17 PROCEDURE — 1125F PR PAIN SEVERITY QUANTIFIED, PAIN PRESENT: ICD-10-PCS | Mod: HCNC,CPTII,S$GLB, | Performed by: NURSE PRACTITIONER

## 2023-03-17 PROCEDURE — 99999 PR PBB SHADOW E&M-EST. PATIENT-LVL IV: CPT | Mod: PBBFAC,HCNC,, | Performed by: NURSE PRACTITIONER

## 2023-03-17 PROCEDURE — 82043 UR ALBUMIN QUANTITATIVE: CPT | Mod: HCNC | Performed by: NURSE PRACTITIONER

## 2023-03-17 PROCEDURE — 3288F PR FALLS RISK ASSESSMENT DOCUMENTED: ICD-10-PCS | Mod: HCNC,CPTII,S$GLB, | Performed by: NURSE PRACTITIONER

## 2023-03-17 NOTE — PROGRESS NOTES
Subjective:       Patient ID: Shirley Metz is a 89 y.o. female.    Chief Complaint: No chief complaint on file.    HPI    CKD with worsening progression and Cardiovasular hx of Afib.  -Creatinine 2.6 3/1. eGFR 17, worsening renal fx    STACEY with cardioversion scheduled 3/21.     Dyspnea improving and peripheral edema subsided     Blood pressure on average running  SBP and 60-70 DBP, At baseline this AM. HR normal.      Reports appetitie has drastically changed and no desire to eat, unless forcing herself to eat.  Reports taste has changes as well.     Reports seeing a nephologist in the past but can't recall how long ago or whom    Review of Systems   Constitutional: Negative.    HENT: Negative.     Eyes: Negative.    Respiratory:  Positive for shortness of breath.    Cardiovascular: Negative.    Gastrointestinal: Negative.    Endocrine: Negative.    Genitourinary: Negative.    Musculoskeletal: Negative.    Integumentary:  Negative.   Psychiatric/Behavioral: Negative.         Objective:      Physical Exam  Constitutional:       Appearance: She is normal weight.   HENT:      Head: Normocephalic and atraumatic.      Mouth/Throat:      Mouth: Mucous membranes are dry.   Eyes:      Pupils: Pupils are equal, round, and reactive to light.   Cardiovascular:      Rate and Rhythm: Rhythm irregular.      Comments: afib  Pulmonary:      Breath sounds: Rales present.   Abdominal:      General: Abdomen is flat. Bowel sounds are normal.   Musculoskeletal:      Comments: Ambulatory with a walker assistance   Skin:     General: Skin is warm.      Findings: No erythema.   Neurological:      General: No focal deficit present.      Mental Status: She is alert and oriented to person, place, and time.       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  -plan to consult nephrology pending repeat lab work and evaluation    Informed to maintain taking Lasix BID, and obtain CXR for comparison and CHF hx.    Proceed with STACEY Cardioversion on 03/21 and maintain scheduled f/u appt

## 2023-03-17 NOTE — ASSESSMENT & PLAN NOTE
Echo    Interpretation Summary  · Concentric remodeling and normal systolic function.  · The estimated ejection fraction is 60%.  · Normal left ventricular diastolic function.  · Normal right ventricular size with normal right ventricular systolic function.  .  Rales right base. Take additional metolazone tomorrow, CXR Monday. Continues losartan.

## 2023-03-17 NOTE — TELEPHONE ENCOUNTER
Patient called in stating she was completely out of the Metoprolol prescription. She was informed that this prescription was sent to the pharmacy on yesterday to [facility]. Patient verbally understood the information given.

## 2023-03-17 NOTE — PROGRESS NOTES
Pt seen and examined by myself. I agree with the assessment and POC put forth by APRN student with the below additional information.     Difficulty sleeping last night. Denies feeling dyspneic. Much more active with out of town visitor the past few days. Some related RUIZ but much better overall and recovers with rest quickly.           Total time spent on medical decsison making was 32 minutes.     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  · The estimated ejection fraction is 60%.  · Normal left ventricular diastolic function.  · Normal right ventricular size with normal right ventricular systolic function.  .  Rales right base. Take additional metolazone tomorrow, CXR Monday. Continues losartan.          Atrial fibrillation with RVR     Continues Eliquis.  Rate controlled with metoprolol.            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

## 2023-03-17 NOTE — PATIENT INSTRUCTIONS
Take metolazone 2.5 mg tomorrow morning.     Take Lasix 40 mg twice daily.    Chest x-ray Monday.

## 2023-03-20 ENCOUNTER — TELEPHONE (OUTPATIENT)
Dept: CARDIOLOGY | Facility: CLINIC | Age: 88
End: 2023-03-20
Payer: MEDICARE

## 2023-03-20 ENCOUNTER — HOSPITAL ENCOUNTER (OUTPATIENT)
Dept: RADIOLOGY | Facility: HOSPITAL | Age: 88
Discharge: HOME | End: 2023-03-20
Attending: NURSE PRACTITIONER
Payer: MEDICARE

## 2023-03-20 DIAGNOSIS — I50.33 ACUTE ON CHRONIC DIASTOLIC CONGESTIVE HEART FAILURE: ICD-10-CM

## 2023-03-20 PROCEDURE — 71046 X-RAY EXAM CHEST 2 VIEWS: CPT | Mod: TC,HCNC

## 2023-03-20 PROCEDURE — 71046 X-RAY EXAM CHEST 2 VIEWS: CPT | Mod: 26,HCNC,, | Performed by: RADIOLOGY

## 2023-03-20 PROCEDURE — 71046 XR CHEST PA AND LATERAL: ICD-10-PCS | Mod: 26,HCNC,, | Performed by: RADIOLOGY

## 2023-03-20 NOTE — TELEPHONE ENCOUNTER
Received call from patient with questions regarding STACEY on 3/21/23 with Dr. Ramirez.  Re- instructed NPO except for usual morning medications, no diuretics, continue Eliquis  10am arrival and instructed to call back with any other questions or concerns.

## 2023-03-21 ENCOUNTER — ANESTHESIA EVENT (OUTPATIENT)
Dept: CARDIOLOGY | Facility: HOSPITAL | Age: 88
End: 2023-03-21
Payer: MEDICARE

## 2023-03-21 ENCOUNTER — ANESTHESIA (OUTPATIENT)
Dept: CARDIOLOGY | Facility: HOSPITAL | Age: 88
End: 2023-03-21
Payer: MEDICARE

## 2023-03-21 ENCOUNTER — TELEPHONE (OUTPATIENT)
Dept: PRIMARY CARE CLINIC | Facility: CLINIC | Age: 88
End: 2023-03-21
Payer: MEDICARE

## 2023-03-21 ENCOUNTER — HOSPITAL ENCOUNTER (OUTPATIENT)
Facility: HOSPITAL | Age: 88
Discharge: HOME | End: 2023-03-21
Attending: INTERNAL MEDICINE
Payer: MEDICARE

## 2023-03-21 ENCOUNTER — TELEPHONE (OUTPATIENT)
Dept: CARDIOLOGY | Facility: CLINIC | Age: 88
End: 2023-03-21
Payer: MEDICARE

## 2023-03-21 DIAGNOSIS — I13.2 HYPERTENSIVE HEART AND RENAL DISEASE WITH BOTH (CONGESTIVE) HEART FAILURE AND RENAL FAILURE: Primary | ICD-10-CM

## 2023-03-21 DIAGNOSIS — I48.0 PAF (PAROXYSMAL ATRIAL FIBRILLATION): Primary | ICD-10-CM

## 2023-03-21 DIAGNOSIS — I48.19 PERSISTENT ATRIAL FIBRILLATION: ICD-10-CM

## 2023-03-21 DIAGNOSIS — N19 HYPERTENSIVE HEART AND RENAL DISEASE WITH BOTH (CONGESTIVE) HEART FAILURE AND RENAL FAILURE: Primary | ICD-10-CM

## 2023-03-21 LAB
BSA FOR ECHO PROCEDURE: 1.92 M2
EJECTION FRACTION: 55 %

## 2023-03-21 PROCEDURE — 93010 EKG 12-LEAD: ICD-10-PCS | Mod: HCNC,,, | Performed by: INTERNAL MEDICINE

## 2023-03-21 PROCEDURE — 92960 CARDIOVERSION ELECTRIC EXT: CPT | Mod: HCNC | Performed by: INTERNAL MEDICINE

## 2023-03-21 PROCEDURE — 63600175 PHARM REV CODE 636 W HCPCS: Mod: HCNC | Performed by: NURSE ANESTHETIST, CERTIFIED REGISTERED

## 2023-03-21 PROCEDURE — 93010 ELECTROCARDIOGRAM REPORT: CPT | Mod: HCNC,,, | Performed by: INTERNAL MEDICINE

## 2023-03-21 PROCEDURE — 37000009 HC ANESTHESIA EA ADD 15 MINS: Mod: HCNC | Performed by: INTERNAL MEDICINE

## 2023-03-21 PROCEDURE — 25000003 PHARM REV CODE 250: Mod: HCNC | Performed by: NURSE ANESTHETIST, CERTIFIED REGISTERED

## 2023-03-21 PROCEDURE — 93005 ELECTROCARDIOGRAM TRACING: CPT | Mod: HCNC,59

## 2023-03-21 PROCEDURE — 37000008 HC ANESTHESIA 1ST 15 MINUTES: Mod: HCNC | Performed by: INTERNAL MEDICINE

## 2023-03-21 RX ORDER — PHENYLEPHRINE HYDROCHLORIDE 10 MG/ML
INJECTION INTRAVENOUS
Status: DISCONTINUED | OUTPATIENT
Start: 2023-03-21 | End: 2023-03-21

## 2023-03-21 RX ORDER — PROPOFOL 10 MG/ML
VIAL (ML) INTRAVENOUS
Status: DISCONTINUED | OUTPATIENT
Start: 2023-03-21 | End: 2023-03-21

## 2023-03-21 RX ORDER — LIDOCAINE HYDROCHLORIDE 20 MG/ML
INJECTION, SOLUTION EPIDURAL; INFILTRATION; INTRACAUDAL; PERINEURAL
Status: DISCONTINUED | OUTPATIENT
Start: 2023-03-21 | End: 2023-03-21

## 2023-03-21 RX ADMIN — PHENYLEPHRINE HYDROCHLORIDE 100 MCG: 10 INJECTION INTRAVENOUS at 12:03

## 2023-03-21 RX ADMIN — PROPOFOL 70 MG: 10 INJECTION, EMULSION INTRAVENOUS at 12:03

## 2023-03-21 RX ADMIN — SODIUM CHLORIDE: 9 INJECTION, SOLUTION INTRAVENOUS at 11:03

## 2023-03-21 RX ADMIN — LIDOCAINE HYDROCHLORIDE 100 MG: 20 INJECTION, SOLUTION EPIDURAL; INFILTRATION; INTRACAUDAL; PERINEURAL at 12:03

## 2023-03-21 NOTE — TELEPHONE ENCOUNTER
Pt had cardioversion today- complaining of pain to throat- states it started after she got home from hospital.  States she sent a message to Dr. Ramirez but he is still at the hospital.  States that it hurts to swallow or eat. Took 2 Tylenol. Denies any issues with breathing. Please advise.

## 2023-03-21 NOTE — BRIEF OP NOTE
Surgeon/Physician: Trevon Ramirez MD     Pre Op Diagnosis: Afib r/o thrombus    Post OP Diagnosis: No SELVIN thrombus    Procedure Performed: Transesophageal echocardiogram + DCCV     Procedure Description: The risks, benefits, and alternatives of the procedure expalined in detail with patient and family. The patient voices understanding and all questions have been addressed. The patient agrees to proceed as planned.   The patient was sedated by anesthesiologist. The probe was advanced via throat into esophagus smoothly. The patient tolerated the procedure well and there was no complications. The probed was withdrawal at the end of study. The patient was hemodynamically stable.     Estimated Blood Loss: none.     Findings / Operative Note:   No SELVIN thrombus noted.  Full report to follow    Successful DCCV to NSR.     Ok to discharge to home once hemodynamically stable.    F/U with me and Dr. Kelly at cardiology clinic.     Trevon Ramirez MD, [facility]  Cardiovascular Disease  [facility], [place]  [phone] (P)

## 2023-03-21 NOTE — ANESTHESIA POSTPROCEDURE EVALUATION
Anesthesia Post Evaluation    Patient: Shirley Metz    Procedure(s) Performed: Procedure(s) (LRB):  Transesophageal echo (STACEY) intra-procedure log documentation (N/A)    Final Anesthesia Type: MAC      Patient location during evaluation: Cath Lab  Patient participation: Yes- Able to Participate  Level of consciousness: awake and alert  Post-procedure vital signs: reviewed and stable  Pain management: adequate  Airway patency: patent    PONV status at discharge: No PONV  Anesthetic complications: no      Cardiovascular status: hemodynamically stable  Respiratory status: unassisted, room air and spontaneous ventilation  Hydration status: euvolemic  Follow-up not needed.          Vitals Value Taken Time   /58 03/21/23 1051   Temp 36.7 °C (98.1 °F) 03/21/23 1051   Pulse 79 03/21/23 1051   Resp 18 03/21/23 1051   SpO2 96 % 03/21/23 1051         No case tracking events are documented in the log.      Pain/Beata Score: Beata Score: 10 (3/21/2023 11:55 AM)

## 2023-03-21 NOTE — ANESTHESIA PREPROCEDURE EVALUATION
03/21/2023  Shirley Metz is a 89 y.o., female.    Active Problem List with Overview Notes    Diagnosis Date Noted    Acute on chronic diastolic congestive heart failure 01/11/2023    Troponin level elevated 01/11/2023    Atheroma of artery 01/05/2023    Coagulopathy 08/09/2022    Elevated serum creatinine 07/12/2022    Atrial fibrillation with RVR 07/11/2022    Seasonal allergic rhinitis 05/23/2022    B12 deficiency 01/24/2022    Stenosis of carotid artery 10/05/2021     US 2/20      Carotid artery calcification, bilateral 10/05/2021     Ct 4/21      Rectal abnormality 09/27/2021     Noted on CT 3/2021.      Pain in left leg 08/03/2021    Bilateral recurrent inguinal hernia without obstruction or gangrene 07/15/2021     Occult.  Previously fat-containing.      LLQ abdominal pain 07/15/2021    Lumbar spondylosis 06/16/2021     Per CT 3/21.  BLE weakness.      Pulmonary granuloma 06/02/2021     PET CT.      Statin intolerance 05/03/2021     Intolerant of statins and multiple BP medications.     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        Neuropathy 08/05/2016     12/16/10. EMG:    Moderately severe carpal Tunnel syndrome in the right hand.   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       Myalgia 02/22/2016    Osteopenia of hip 08/10/2015     DEXA 8/7/2017  DEXA 7/31/15 H -0.9, N -1.2, S 1.5       LVH (left ventricular hypertrophy) due to hypertensive disease 09/22/2014 4/11/14 2 D  Echo  Moderate left atrial enlargement.  Concentric hypertrophy.   Normal LVEF 60-65%. Normal RV sys function. Trivial to mild MR.       Other microscopic hematuria 09/21/2014     -[employer] [employer] in 2010 ; no Hx of proteinuria       Shortness of breath 03/24/2014    Abnormal ECG 03/24/2014    PVC's (premature ventricular contractions) 03/24/2014    Macular degeneration - Both Eyes 06/26/2013    Fuch's endothelial dystrophy - Both Eyes 06/26/2013     Dx updated per 2019 IMO Load      Lens replaced by other means 06/26/2013    COAG (chronic open-angle glaucoma) - Both Eyes 06/26/2013    Blepharitis, unspecified - Both Eyes 06/26/2013     Dx updated per 2019 IMO Load      HTN (hypertension) 06/10/2013     Fluctuating BP with Myalgias      Mixed hyperlipidemia 06/10/2013    Primary osteoarthritis involving multiple joints 06/10/2013        Review of patient's allergies indicates:   Allergen Reactions    Carvedilol Swelling     Lip swelling    Cephalexin Other (See Comments)     Muscle/joint weakness unable to move     Doxycycline Shortness Of Breath, Nausea And Vomiting and Other (See Comments)     weakness    Erythromycin Other (See Comments)     Complete weakness/could not walk     Gadolinium-containing contrast media Swelling     angioedema    Hydrocodone-acetaminophen Shortness Of Breath     wheezing    Inveltys [loteprednol etabonate] Palpitations and Other (See Comments)     Patient stated bp went up.    Labetalol Shortness Of Breath, Nausea Only, Palpitations and Other (See Comments)     weakness    Loratadine Other (See Comments)     Double vision/spots  Other reaction(s): double vision    Naproxen      wheezing  wheezing  wheezing  Other reaction(s): wheezing    Statins-hmg-coa reductase inhibitors Other (See Comments)     Severe Muscle weakness  Muscle weakness  Severe Muscle weakness  Other reaction(s): severe muscle weakness    Amlodipine Other (See Comments)     Myalgias    Fenofibrate Other (See Comments)     muscle weakness      Hydralazine Other (See Comments)     muscle tremors    Hydralazine analogues Other (See Comments)     Muscle tremors/aches      Loteprednol Other (See Comments)     increased BP    Macrolide antibiotics Other (See Comments)     Complete weakness/could not walk      Moxifloxacin Hives and Other (See Comments)     muscle soreness    Timolol Other (See Comments)     Blurred vision, Burning eyes, Severe muscle weakness, eye problems.      Verapamil Diarrhea     Severe diarrhea.      Hydrocortisone     Isosorbide      Tolerates Imdur    Tetanus and diphtheria toxoids     Adhesive Rash     Clonidine patch--contact dermatitis    Codeine Diarrhea and Other (See Comments)     Loss of balance       Doxazosin Palpitations    Fexofenadine Other (See Comments)     Double vision/spots       Hydrocortisone (bulk) Other (See Comments)     Only suppository/ caused muscle weakness    Latanoprost Other (See Comments)     Muscle weakness      Olopatadine Other (See Comments)     Muscle weakness      Prednisone Anxiety       Pre-op Assessment    I have reviewed the Patient Summary Reports.     I have reviewed the Nursing Notes. I have reviewed the NPO  Status.   I have reviewed the Medications.     Review of Systems  Anesthesia Hx:  No problems with previous Anesthesia    Social:  Non-Smoker    Cardiovascular:   Hypertension CAD  Dysrhythmias atrial fibrillation CHF PVD hyperlipidemia Carotid artery stenosis     Atrial fibrillation with premature ventricular or aberrantly conducted   complexes   Low voltage QRS   Septal infarct (cited on or before 01-MAR-2023)   Abnormal ECG   When compared with ECG of 10-JARED-2023 20:22,   Questionable change in initial forces of Septal leads   Nonspecific T wave abnormality now evident in Lateral leads   QT has lengthened   Confirmed by RIOS COFFEY MD ([id number]) on 3/1/2023 4:31:27 PM     Summary    ? STACEY to evaluate for LA/SELVIN thrombus prior to PVI ablation.  ? Normal appearing left atrial appendage. No thrombus is present in the appendage. Abnormal appendage velocities of 0.3 m/s.  ? The left ventricle is normal in size with normal systolic function.  ? The estimated ejection fraction is 55%.  ? Mild mitral regurgitation.  ? Normal right ventricular size with normal right ventricular systolic function.  ? Mild tricuspid regurgitation.  ? Grade 4 aortic atheroma present in the descending aorta   Pulmonary:   Shortness of breath pulm nodules    Renal/:   Chronic Renal Disease, CKD    Musculoskeletal:   Arthritis     Neurological:   Peripheral Neuropathy    Endocrine:   Denies Diabetes.  Obesity / BMI > 30  Psych:   anxiety          Physical Exam  General: Well nourished, Cooperative, Alert and Oriented    Airway:  Mallampati: III / II  Mouth Opening: Normal  TM Distance: Normal  Neck ROM: Normal ROM    Dental:  Intact        Anesthesia Plan  Type of Anesthesia, risks & benefits discussed:    Anesthesia Type: MAC  Intra-op Monitoring Plan: Standard ASA Monitors  Induction:  IV  Informed Consent: Informed consent signed with the Patient and all parties understand the risks and agree with anesthesia plan.  All questions answered.    ASA Score: 3  Day of Surgery Review of History & Physical: H&P Update referred to the surgeon/provider.I have interviewed and examined the patient. I have reviewed the patient's H&P dated: There are no significant changes.     Ready For Surgery From Anesthesia Perspective.     .

## 2023-03-21 NOTE — TELEPHONE ENCOUNTER
Patient has a recall letter being sent out later this year. She isn't due for a follow up with Dr. Newman until September.

## 2023-03-21 NOTE — INTERVAL H&P NOTE
The patient has been examined and the H&P has been reviewed:    I concur with the findings and no changes have occurred since H&P was written.    Procedure risks, benefits and alternative options discussed and understood by patient/family.      STACEY/DCCV today     There are no hospital problems to display for this patient.

## 2023-03-21 NOTE — TELEPHONE ENCOUNTER
Pt notified- Please let her know that she may have sore throat after the anesthetic has worn off. This should go away after about 24 hours. Throat lozenges and cool foods will help soothe the irritation. It is normal to have a small amount of blood tinged mucous secretions.

## 2023-03-21 NOTE — TELEPHONE ENCOUNTER
Tried calling pt to inform her    Please let her know that she may have sore throat after the anesthetic has worn off. This should go away after about 24 hours. Throat lozenges and cool foods will help soothe the irritation. It is normal to have a small amount of blood tinged mucous secretions. Per James      ----- Message -----  From: Lula Blue MA  Sent: 3/21/2023   4:00 PM CDT  To: Trevon Ramirez MD    Please advise , pt stated it's exteremly painful to swallow   ----- Message -----  From: Jeannette Judge  Sent: 3/21/2023   3:47 PM CDT  To: [facility] Lester Staff    Patient requesting a call back in regards to throat pain and trouble swallowing following her transesophageal echo today. Please call back at [phone]. Thanks KD

## 2023-03-21 NOTE — TELEPHONE ENCOUNTER
----- Message from Heather Miller NP sent at 3/21/2023  8:30 AM CDT -----  Renal function slightly improved. Schedule nephrology appt for follow up.

## 2023-03-21 NOTE — TRANSFER OF CARE
"Anesthesia Transfer of Care Note    Patient: Shirley Metz    Procedure(s) Performed: Procedure(s) (LRB):  Transesophageal echo (STACEY) intra-procedure log documentation (N/A)    Patient location: Other: CV[facility]    Anesthesia Type: MAC    Transport from OR: Transported from OR on 2-3 L/min O2 by NC with adequate spontaneous ventilation    Post pain: adequate analgesia    Post assessment: no apparent anesthetic complications    Post vital signs: stable    Level of consciousness: awake    Nausea/Vomiting: no nausea/vomiting    Complications: none    Transfer of care protocol was followed      Last vitals:   Visit Vitals  BP (!) 127/58 (BP Location: Right arm, Patient Position: Lying)   Pulse 79   Temp 36.7 °C (98.1 °F)   Resp 18   Ht 5' 4" (1.626 m)   Wt 82 kg (180 lb 12.4 oz)   LMP  (LMP Unknown)   SpO2 96%   Breastfeeding No   BMI 31.03 kg/m²     "

## 2023-03-21 NOTE — DISCHARGE SUMMARY
[facility] - Cath Lab (Hospital)  Discharge Note  Short Stay    Procedure(s) (LRB):  Transesophageal echo (STACEY) intra-procedure log documentation (N/A)    Surgeon/Physician: Trevon Ramirez MD     Pre Op Diagnosis: Afib r/o thrombus    Post OP Diagnosis: No SELVIN thrombus    Procedure Performed: Transesophageal echocardiogram + DCCV     Procedure Description: The risks, benefits, and alternatives of the procedure expalined in detail with patient and family. The patient voices understanding and all questions have been addressed. The patient agrees to proceed as planned.   The patient was sedated by anesthesiologist. The probe was advanced via throat into esophagus smoothly. The patient tolerated the procedure well and there was no complications. The probed was withdrawal at the end of study. The patient was hemodynamically stable.     Estimated Blood Loss: none.     Findings / Operative Note:   No SELVIN thrombus noted.  Full report to follow    Successful DCCV to NSR.     Ok to discharge to home once hemodynamically stable.    F/U with me and Dr. Kelly at cardiology clinic.     Trevon Ramirez MD, [facility]  Cardiovascular Disease  [facility], [place]  [phone] (P)       OUTCOME: Patient tolerated treatment/procedure well without complication and is now ready for discharge.    DISPOSITION: Home or Self Care    FINAL DIAGNOSIS:  PAF    FOLLOWUP: In clinic    DISCHARGE INSTRUCTIONS:  No discharge procedures on file.     TIME SPENT ON DISCHARGE: 35 minutes

## 2023-03-22 ENCOUNTER — TELEPHONE (OUTPATIENT)
Dept: PRIMARY CARE CLINIC | Facility: CLINIC | Age: 88
End: 2023-03-22
Payer: MEDICARE

## 2023-03-22 NOTE — TELEPHONE ENCOUNTER
Last night had severe pain to left jaw last night with tinnitis, then severe pain to tongue and difficulty swallowing. Pain was constant. Swallowing increased pain. Very dry mouth today. Took APAP and arnica with partial relief. Feels very weak today, BP 94/46 this AM, slightly higher now. Did not take losartan last night due to low BP. HR 66, 114/65 currently. Able to swallow now. No longer has pain to jaw. Some mild episodic RUIZ today while ambulating to dining room. Weight is unchanged. Missed lasix dose last night and took 1/2 dose this AM. Does not believe she has had Afib since discharged. Sx improving overall.

## 2023-03-22 NOTE — PROGRESS NOTES
Renal clinic note in response to PCP request for a sooner appt.      Labs were noted. There is no significant change in Cr. Slow loss of GFR is expected as we all age. Pt has age related decline in kidney function. Note that pt is 89 years old after all. To protect the remaining kidney function: avoid NSAIds, avoid BOTH dehydration and over-hydration, adjust all meds for the renal function, and do not treat BP too aggressively in a 89 year old. May f/u routinely. F/u with PCP.    ===View-only below this line===  ----- Message -----  From: Clotilde Villar LPN  Sent: 3/21/2023   4:47 PM CDT  To: Paulie Newman MD    ----- Message from Clotilde Villar LPN sent at 3/21/2023  4:47 PM CDT -----    Ms. Flower has a reduced GFR since last visit.

## 2023-03-27 ENCOUNTER — TELEPHONE (OUTPATIENT)
Dept: PRIMARY CARE CLINIC | Facility: CLINIC | Age: 88
End: 2023-03-27
Payer: MEDICARE

## 2023-03-27 VITALS
WEIGHT: 180 LBS | OXYGEN SATURATION: 99 % | TEMPERATURE: 98 F | BODY MASS INDEX: 30.73 KG/M2 | HEIGHT: 64 IN | SYSTOLIC BLOOD PRESSURE: 105 MMHG | DIASTOLIC BLOOD PRESSURE: 44 MMHG | HEART RATE: 66 BPM | RESPIRATION RATE: 22 BRPM

## 2023-03-27 DIAGNOSIS — I50.33 ACUTE ON CHRONIC DIASTOLIC CONGESTIVE HEART FAILURE: ICD-10-CM

## 2023-03-27 RX ORDER — FUROSEMIDE 40 MG/1
40 TABLET ORAL 2 TIMES DAILY
Qty: 60 TABLET | Refills: 5 | Status: SHIPPED | OUTPATIENT
Start: 2023-03-27 | End: 2023-04-03

## 2023-03-27 NOTE — TELEPHONE ENCOUNTER
----- Message from Dilan Chavez sent at 3/27/2023  8:17 AM CDT -----  Contact: [phone]  Had heart procedure last week and having a lot of problems, would like to know if pt should be seen.

## 2023-03-27 NOTE — TELEPHONE ENCOUNTER
Had procedure last week- throat feels better this week. Concerned with rash to chest area. Would like for you to call her if possible. [phone]

## 2023-03-27 NOTE — TELEPHONE ENCOUNTER
Still some pain with swallowing but this is improving.     BP, HR, Pox all okay. Now some worsening dyspnea. More frequent episodes and more severe. No cough. Able to ambulate shorter distance. 1 lb wt gain. Lasix 40 mg plus 20 mg. Lower lip is sore/skin peeling off.    Tinnitus to ear improved.     New rash near collarbone, itching. Improving with OTC topicals. Not at location of pad during procedure.     She will be seen in clinic tomorrow.

## 2023-03-28 ENCOUNTER — OFFICE VISIT (OUTPATIENT)
Dept: PRIMARY CARE CLINIC | Facility: CLINIC | Age: 88
End: 2023-03-28
Payer: MEDICARE

## 2023-03-28 VITALS
HEART RATE: 67 BPM | HEIGHT: 64 IN | SYSTOLIC BLOOD PRESSURE: 139 MMHG | WEIGHT: 178.5 LBS | DIASTOLIC BLOOD PRESSURE: 62 MMHG | BODY MASS INDEX: 30.48 KG/M2 | TEMPERATURE: 98 F | OXYGEN SATURATION: 96 %

## 2023-03-28 DIAGNOSIS — I48.19 PERSISTENT ATRIAL FIBRILLATION: ICD-10-CM

## 2023-03-28 DIAGNOSIS — E21.3 HYPERPARATHYROIDISM: ICD-10-CM

## 2023-03-28 DIAGNOSIS — L30.9 DERMATITIS: ICD-10-CM

## 2023-03-28 DIAGNOSIS — R06.09 DOE (DYSPNEA ON EXERTION): Primary | ICD-10-CM

## 2023-03-28 PROCEDURE — 1160F PR REVIEW ALL MEDS BY PRESCRIBER/CLIN PHARMACIST DOCUMENTED: ICD-10-PCS | Mod: HCNC,CPTII,S$GLB, | Performed by: NURSE PRACTITIONER

## 2023-03-28 PROCEDURE — 1159F PR MEDICATION LIST DOCUMENTED IN MEDICAL RECORD: ICD-10-PCS | Mod: HCNC,CPTII,S$GLB, | Performed by: NURSE PRACTITIONER

## 2023-03-28 PROCEDURE — 99215 PR OFFICE/OUTPT VISIT, EST, LEVL V, 40-54 MIN: ICD-10-PCS | Mod: HCNC,S$GLB,, | Performed by: NURSE PRACTITIONER

## 2023-03-28 PROCEDURE — 99999 PR PBB SHADOW E&M-EST. PATIENT-LVL V: CPT | Mod: PBBFAC,HCNC,, | Performed by: NURSE PRACTITIONER

## 2023-03-28 PROCEDURE — 3288F FALL RISK ASSESSMENT DOCD: CPT | Mod: HCNC,CPTII,S$GLB, | Performed by: NURSE PRACTITIONER

## 2023-03-28 PROCEDURE — 1101F PR PT FALLS ASSESS DOC 0-1 FALLS W/OUT INJ PAST YR: ICD-10-PCS | Mod: HCNC,CPTII,S$GLB, | Performed by: NURSE PRACTITIONER

## 2023-03-28 PROCEDURE — 99999 PR PBB SHADOW E&M-EST. PATIENT-LVL V: ICD-10-PCS | Mod: PBBFAC,HCNC,, | Performed by: NURSE PRACTITIONER

## 2023-03-28 PROCEDURE — 99215 OFFICE O/P EST HI 40 MIN: CPT | Mod: HCNC,S$GLB,, | Performed by: NURSE PRACTITIONER

## 2023-03-28 PROCEDURE — 1159F MED LIST DOCD IN RCRD: CPT | Mod: HCNC,CPTII,S$GLB, | Performed by: NURSE PRACTITIONER

## 2023-03-28 PROCEDURE — 1160F RVW MEDS BY RX/DR IN RCRD: CPT | Mod: HCNC,CPTII,S$GLB, | Performed by: NURSE PRACTITIONER

## 2023-03-28 PROCEDURE — 1101F PT FALLS ASSESS-DOCD LE1/YR: CPT | Mod: HCNC,CPTII,S$GLB, | Performed by: NURSE PRACTITIONER

## 2023-03-28 PROCEDURE — 3288F PR FALLS RISK ASSESSMENT DOCUMENTED: ICD-10-PCS | Mod: HCNC,CPTII,S$GLB, | Performed by: NURSE PRACTITIONER

## 2023-03-28 RX ORDER — METOLAZONE 2.5 MG/1
TABLET ORAL
COMMUNITY
Start: 2023-03-22 | End: 2023-03-28

## 2023-03-28 NOTE — ASSESSMENT & PLAN NOTE
With rales right base. Recommend CXR, pt wants to wait to see if sx. Improve. Pneumonia versus pulm edema.

## 2023-03-28 NOTE — PROGRESS NOTES
Shirley Metz  03/28/2023  [id number]    Phylicia Deleon MD  Patient Care Team:  Phylicia Deleon MD as PCP - General (Internal Medicine)  Wilbert Ware MD as Consulting Physician (Ophthalmology)  Rajinder Quintanilla MD as Consulting Physician (Ophthalmology)  Suzan Gregorio PA-C as Physician Assistant (Rheumatology)  Rosario Valadez MD as Consulting Physician (Dermatology)  Trevon Ramirez MD as Consulting Physician (Cardiology)  Amish Mendoza MD (Pulmonary Disease)  Jaquan Choi MD as Consulting Physician (Vascular Surgery)  Debbie Choi MD (Inactive) as Consulting Physician (Gynecology)  Emmanuel Fish MD as Consulting Physician (Hand Surgery)  PAXTON Chaidez Jr., MD as Consulting Physician (Orthopedic Surgery)  Phylicia Deleon MD as Physician (Internal Medicine)  Heather Miller NP as Nurse Practitioner (Family Medicine)      [facility] Note      Chief Complaint:  Chief Complaint   Patient presents with    Shortness of Breath       History of Present Illness:  HPI      Rash to chest wall, pain to neck and ear after STACEY with successful cardioversion 3/21/23. Pain has been gradually improving. Some increased RUIZ reported, onset after procedure, episodes more often and more intense. Not worse at night. Sleeps on two pillows.     Rash to center of chest began 2 days after procedure. Red, raised lesions, pruritic. Using topical calendula.     Sore throat after procedure, sore with swallowing. Sores to inside of mouth. Noted day of procedure. Still two lesions present. Painful left ear today. Previously radiated from collarbone to gums to left ear to head. H/A with severe pain. Hearing loss after procedure, left side only. Significant tinnitus with pain, buzzing sound. No further tinnitus or hearing loss.     She had decreased furosemide dose to 20 mg in afternoons few days ago.       Review of Systems   Constitutional:  Negative for fever and malaise/fatigue.    HENT:  Negative for congestion and nosebleeds.    Respiratory:  Positive for shortness of breath. Negative for cough and sputum production.    Cardiovascular:  Positive for palpitations (this AM). Negative for chest pain.   Gastrointestinal:  Negative for abdominal pain, nausea and vomiting.   Genitourinary:  Negative for dysuria.   Musculoskeletal:  Negative for falls.   Neurological:  Negative for dizziness and headaches.       The following were reviewed: Active problem list, medication list, allergies, family history, social history, and Health Maintenance.     History:  Past Medical History:   Diagnosis Date    Anemia 11/29/2018    Anxiety 11/28/2017    Arthritis     Atrial fibrillation with RVR 10/9/2019    Back pain     Basal cell carcinoma 03/29/2018    left mid back    Chronic diastolic congestive heart failure 10/15/2019    CKD (chronic kidney disease) stage 3, GFR 30-59 ml/min 8/8/2016    Colon polyps     reported per patient.     Coronary artery calcification 10/5/2021    Fuchs' corneal dystrophy     General anesthetics causing adverse effect in therapeutic use     woke up during surgery and gagged on ET tube    Glaucoma     Glaucoma     Heart failure with preserved left ventricular function (HFpEF) 7/24/2018    Hyperlipidemia     Hypertension     Macular degeneration dry    Obesity     PVD (peripheral vascular disease) 4/26/2021    PVD (peripheral vascular disease) 4/26/2021    Squamous cell carcinoma 01/08/2019    left shoulder    Stenosis of carotid artery 10/5/2021    Trouble in sleeping     Type 2 diabetes mellitus without complication, without long-term current use of insulin 2/24/2021    Urinary tract infection      Past Surgical History:   Procedure Laterality Date    ABLATION OF ARRHYTHMOGENIC FOCUS FOR ATRIAL FIBRILLATION N/A 11/3/2022    Procedure: Ablation atrial fibrillation;  Surgeon: Toi Kelly MD;  Location: [facility];  Service: Cardiology;  Laterality: N/A;  afib, PVI/CTI,  RFA, STACEY (cx if SR), DEDE, ABDIEL orantes, 3 Prep    ADENOIDECTOMY  1938    BREAST BIOPSY Left     1997. benign    BREAST CYST EXCISION Left 1997 approx    CARPAL TUNNEL RELEASE Right 2012 approx    CATARACT EXTRACTION Bilateral 1994    CHOLECYSTECTOMY  1975    CORNEAL TRANSPLANT Bilateral 08/2015 8/2015 - left; Right 4/2016    EYE SURGERY      Fuch's Corneal dystrophy - had 2nd operation with Dr. Quintanilla    EYE SURGERY      Cataract wIOL    left thumb Left 2011    removed cuboid for arthritis    REVERSE TOTAL SHOULDER ARTHROPLASTY Left 8/21/2018    Procedure: ARTHROPLASTY, SHOULDER, TOTAL, REVERSE;  Surgeon: Solomon Lujan MD;  Location: [facility] OR;  Service: Orthopedics;  Laterality: Left;  WITH BICEP TENODESIS    SKIN CANCER EXCISION Left 2017    BCC removal by Dr. Valadez    SLT- OS (aka TRABECULOPLASTY) Left 05/11/2011    repeat 3/14/12    TENOTOMY Left 8/21/2018    Procedure: TENOTOMY;  Surgeon: Solomon Lujan MD;  Location: [facility] OR;  Service: Orthopedics;  Laterality: Left;    TONSILLECTOMY  1938    TRANSESOPHAGEAL ECHOCARDIOGRAM WITH POSSIBLE CARDIOVERSION (STACEY W/ POSS CARDIOVERSION) N/A 3/21/2023    Procedure: Transesophageal echo (STACEY) intra-procedure log documentation;  Surgeon: Trevon Ramirez MD;  Location: [facility] CATH LAB;  Service: Cardiology;  Laterality: N/A;    TREATMENT OF CARDIAC ARRHYTHMIA N/A 10/10/2019    Procedure: CARDIOVERSION;  Surgeon: Pete Durán MD;  Location: [facility] CATH LAB;  Service: Cardiology;  Laterality: N/A;    TREATMENT OF CARDIAC ARRHYTHMIA N/A 12/6/2019    Procedure: CARDIOVERSION;  Surgeon: Samy Castillo MD;  Location: [facility] CATH LAB;  Service: Cardiology;  Laterality: N/A;     Family History   Problem Relation Age of Onset    Heart disease Mother     Hypertension Mother     Cancer Father 88        sarcoma( ?Kaposi's ) on leg    Deep vein thrombosis Neg Hx     Ovarian cancer Neg Hx     Breast cancer Neg Hx     Kidney disease Neg Hx     Strabismus Neg Hx     Retinal detachment Neg Hx      Macular degeneration Neg Hx     Glaucoma Neg Hx     Blindness Neg Hx     Amblyopia Neg Hx     Eczema Neg Hx     Lupus Neg Hx     Melanoma Neg Hx     Psoriasis Neg Hx     Diabetes Neg Hx     Stroke Neg Hx     Mental retardation Neg Hx     Mental illness Neg Hx     Hyperlipidemia Neg Hx     COPD Neg Hx     Asthma Neg Hx     Depression Neg Hx     Alcohol abuse Neg Hx     Drug abuse Neg Hx      Social History     Socioeconomic History    Marital status:     Number of children: 3   Tobacco Use    Smoking status: Never    Smokeless tobacco: Never    Tobacco comments:     history of passive smoking from her ex-   Substance and Sexual Activity    Alcohol use: Yes     Alcohol/week: 2.0 standard drinks     Types: 2 Standard drinks or equivalent per week     Comment: occ    Drug use: No    Sexual activity: Not Currently     Partners: Male     Birth control/protection: Post-menopausal     Comment: discussed protection for STD's   Other Topics Concern    Are you pregnant or think you may be? No    Breast-feeding No   Social History Narrative     x 2,  x 1. Retired artist - previously doing jewelry/wall pieces; ; lives in [facility]; has 3 sons - 52( lives in [place], 54, and 56;exercises minimally - limited due to back issues. Still drives. Does have a Living Will.     Social Determinants of Health     Physical Activity: Unknown    Days of Exercise per Week: Patient refused    Minutes of Exercise per Session: 10 min   Stress: No Stress Concern Present    Feeling of Stress : Not at all     Patient Active Problem List   Diagnosis    HTN (hypertension)    Mixed hyperlipidemia    Primary osteoarthritis involving multiple joints    Macular degeneration - Both Eyes    Fuch's endothelial dystrophy - Both Eyes    Lens replaced by other means    COAG (chronic open-angle glaucoma) - Both Eyes    Blepharitis, unspecified - Both Eyes    RUIZ (dyspnea on exertion)    Abnormal ECG     PVC's (premature ventricular contractions)    Other microscopic hematuria    LVH (left ventricular hypertrophy) due to hypertensive disease    Osteopenia of hip    Myalgia    Calcification of aorta    Neuropathy    Unequal blood pressures in paired extremities    Medication side effects    History of cornea transplant    Pseudophakia    Insomnia    Anxiety    Pre-diabetes    Obesity (BMI 30.0-34.9)    Elevated troponin    Bilateral shoulder region arthritis    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    Anemia    New onset a-fib    Thrombus of left atrial appendage    Persistent atrial fibrillation    Elevated liver enzymes    Atrial flutter with rapid ventricular response    Chronic anticoagulation    Keratitis sicca, bilateral    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    PAD (peripheral artery disease)    Statin intolerance    Pulmonary nodules/lesions, multiple    Spinal stenosis at L4-L5 level    Pulmonary granuloma    Lumbar spondylosis    Bilateral recurrent inguinal hernia without obstruction or gangrene    LLQ abdominal pain    Pain in left leg    Rectal abnormality    Stenosis of carotid artery    Carotid artery calcification, bilateral    B12 deficiency    Seasonal allergic rhinitis    Atrial fibrillation with RVR    Elevated serum creatinine    Coagulopathy    Atheroma of artery    Acute on chronic diastolic congestive heart failure    Troponin level elevated    Hyperparathyroidism     Review of patient's allergies indicates:   Allergen Reactions    Carvedilol Swelling     Lip swelling    Cephalexin Other (See Comments)     Muscle/joint weakness unable to move     Doxycycline Shortness Of Breath, Nausea And Vomiting and Other (See Comments)     weakness    Erythromycin Other (See Comments)     Complete weakness/could not walk    Gadolinium-containing contrast media Swelling     angioedema    Hydrocodone-acetaminophen Shortness Of Breath     wheezing     Inveltys [loteprednol etabonate] Palpitations and Other (See Comments)     Patient stated bp went up.    Labetalol Shortness Of Breath, Nausea Only, Palpitations and Other (See Comments)     weakness    Loratadine Other (See Comments)     Double vision/spots  Other reaction(s): double vision    Naproxen      wheezing  wheezing  wheezing  Other reaction(s): wheezing    Statins-hmg-coa reductase inhibitors Other (See Comments)     Severe Muscle weakness  Muscle weakness  Severe Muscle weakness  Other reaction(s): severe muscle weakness    Amlodipine Other (See Comments)     Myalgias    Fenofibrate Other (See Comments)     muscle weakness      Hydralazine Other (See Comments)     muscle tremors    Hydralazine analogues Other (See Comments)     Muscle tremors/aches      Loteprednol Other (See Comments)     increased BP    Macrolide antibiotics Other (See Comments)     Complete weakness/could not walk      Moxifloxacin Hives and Other (See Comments)     muscle soreness    Timolol Other (See Comments)     Blurred vision, Burning eyes, Severe muscle weakness, eye problems.      Verapamil Diarrhea     Severe diarrhea.      Hydrocortisone     Isosorbide      Tolerates Imdur    Tetanus and diphtheria toxoids     Adhesive Rash     Clonidine patch--contact dermatitis    Codeine Diarrhea and Other (See Comments)     Loss of balance       Doxazosin Palpitations    Fexofenadine Other (See Comments)     Double vision/spots       Hydrocortisone (bulk) Other (See Comments)     Only suppository/ caused muscle weakness    Latanoprost Other (See Comments)     Muscle weakness      Olopatadine Other (See Comments)     Muscle weakness      Prednisone Anxiety       Medications:  Current Outpatient Medications on File Prior to Visit   Medication Sig Dispense Refill    acetaminophen (TYLENOL) 500 MG tablet Take 500 mg by mouth nightly as needed.      ALPRAZolam (XANAX) 0.5 MG tablet Take 1 tablet (0.5 mg total) by mouth nightly as needed for  Anxiety. 30 tablet 5    apixaban (ELIQUIS) 2.5 mg Tab Take 1 tablet (2.5 mg total) by mouth 2 (two) times daily. 180 tablet 3    b complex vitamins capsule Take 1 capsule by mouth once daily.      brinzolamide (AZOPT) 1 % ophthalmic suspension Place 1 drop into the left eye 2 (two) times a day. (Patient taking differently: Place 1 drop into the left eye every morning.) 5 mL 12    cholecalciferol, vitamin D3, (VITAMIN D3) 50 mcg (2,000 unit) Cap Take 1 capsule by mouth once daily.      coenzyme Q10 200 mg capsule Take 200 mg by mouth once daily.      fish oil-omega-3 fatty acids 300-1,000 mg capsule Take 1 capsule by mouth once daily.      furosemide (LASIX) 40 MG tablet Take 1 tablet (40 mg total) by mouth 2 (two) times a day. 60 tablet 5    losartan (COZAAR) 25 MG tablet Take 1 tablet (25 mg total) by mouth once daily. 180 tablet 1    metoprolol tartrate (LOPRESSOR) 25 MG tablet Take 1 tablet (25 mg total) by mouth 2 (two) times daily. 180 tablet 3    POLYETHYLENE GLYCOL 3350 (MIRALAX ORAL) Take 17 g by mouth 2 (two) times a day. Taking BID      RHOPRESSA 0.02 % ophthalmic solution PLACE 1 DROP INTO THE LEFT EYE EVERY EVENING. 2.5 mL 11    sars-cov-2, covid-19 pfizer omicron, (PFIZER COVID BIVAL,12Y UP,,PF,) 30 mcg/0.3 mL injection Inject into the muscle. 0.3 mL 0    TRAVATAN Z 0.004 % ophthalmic solution Place 1 drop into the left eye every evening. 2.5 mL 12    [DISCONTINUED] metOLazone (ZAROXOLYN) 2.5 MG tablet Take by mouth.      [DISCONTINUED] cloNIDine (CATAPRES) 0.1 MG tablet Take 1 tablet (0.1 mg total) by mouth 2 (two) times daily as needed (systolic BP over 180). 20 tablet 1    [DISCONTINUED] isosorbide mononitrate (IMDUR) 30 MG 24 hr tablet Take 1 tablet (30 mg total) by mouth every evening. 90 tablet 1     Current Facility-Administered Medications on File Prior to Visit   Medication Dose Route Frequency Provider Last Rate Last Admin    sodium chloride 0.9% flush 3 mL  3 mL Intravenous PRN Kristene W.  OJSE Galarza        vancomycin in dextrose 5 % 1 gram/250 mL IVPB 1,000 mg  1,000 mg Intravenous On Call Procedure Vianney Fountain NP           Medications have been reviewed and reconciled with patient at visit today.    Barriers to medications present (no )    Fall since last office visit (no )      Exam:  Vitals:    03/28/23 1056   BP: 139/62   Pulse: 67   Temp: 97.9 °F (36.6 °C)     Weight: 81 kg (178 lb 8 oz)   Body mass index is 30.64 kg/m².      BP Readings from Last 3 Encounters:   03/28/23 139/62   03/21/23 (!) 105/44   03/17/23 106/61     Wt Readings from Last 3 Encounters:   03/28/23 1056 81 kg (178 lb 8 oz)   03/21/23 1156 81.6 kg (180 lb)   03/21/23 1051 82 kg (180 lb 12.4 oz)   03/17/23 0946 82 kg (180 lb 12.8 oz)            Physical Exam  HENT:      Head: Normocephalic.      Right Ear: Tympanic membrane and external ear normal. There is no impacted cerumen.      Left Ear: Tympanic membrane and external ear normal. There is no impacted cerumen.      Nose: Nose normal.      Mouth/Throat:      Mouth: Mucous membranes are moist.      Pharynx: No oropharyngeal exudate or posterior oropharyngeal erythema.      Comments: Approx 3 mm erythematous papule right buccal mucosa.  Eyes:      General: No scleral icterus.     Conjunctiva/sclera: Conjunctivae normal.   Cardiovascular:      Rate and Rhythm: Normal rate and regular rhythm.      Heart sounds: Normal heart sounds.   Pulmonary:      Effort: Pulmonary effort is normal.      Breath sounds: Rales (right base) present.   Abdominal:      Palpations: Abdomen is soft.      Tenderness: There is no abdominal tenderness.   Musculoskeletal:         General: No swelling.      Cervical back: Normal range of motion and neck supple.   Lymphadenopathy:      Cervical: No cervical adenopathy.   Skin:     General: Skin is warm and dry.   Neurological:      Mental Status: She is alert. Mental status is at baseline.   Psychiatric:         Mood and Affect: Mood normal.          Behavior: Behavior normal.       Laboratory Reviewed: (Yes)  Lab Results   Component Value Date    WBC 6.63 01/12/2023    HGB 11.5 (L) 01/12/2023    HCT 35.6 (L) 01/12/2023     01/12/2023    CHOL 168 08/31/2022    TRIG 98 08/31/2022    HDL 43 08/31/2022    ALT 16 01/12/2023    AST 21 01/12/2023     03/20/2023    K 3.6 03/20/2023     03/20/2023    CREATININE 2.3 (H) 03/20/2023    BUN 45 (H) 03/20/2023    CO2 27 03/20/2023    TSH 3.826 08/31/2022    INR 1.1 10/25/2022    HGBA1C 7.0 (H) 03/20/2023           Health Maintenance  Health Maintenance Topics with due status: Not Due       Topic Last Completion Date    Lipid Panel 08/31/2022    Hemoglobin A1c (Prediabetes) 03/20/2023     Health Maintenance Due   Topic Date Due    Shingles Vaccine (1 of 2) Never done    TETANUS VACCINE  11/09/2021    Pneumococcal Vaccines (Age 65+) (2 - PCV) 03/16/2023       Assessment:  Problem List Items Addressed This Visit          Cardiac/Vascular    Persistent atrial fibrillation     S/P cardioversion, now RRR today.   Continue          RUIZ (dyspnea on exertion) - Primary     With rales right base. Recommend CXR, pt wants to wait to see if sx. Improve. Pneumonia versus pulm edema.             Endocrine    Hyperparathyroidism     Lab Results   Component Value Date    .1 (H) 03/20/2023    CALCIUM 9.6 03/20/2023    PHOS 5.2 (H) 03/20/2023    PHOS 5.2 (H) 03/20/2023   Continue POC.          Other Visit Diagnoses       Dermatitis        Sx improving. Does not want rx. She will continue calendula topically.               Plan:  RUIZ (dyspnea on exertion)  -     Cancel: X-Ray Chest PA And Lateral    Dermatitis  Comments:  Sx improving. Does not want rx. She will continue calendula topically.     Persistent atrial fibrillation    Hyperparathyroidism      -Patient's lab results were reviewed and discussed with patient  -Treatment options and alternatives were discussed with the patient. Patient expressed  understanding. Patient was given the opportunity to ask questions and be an active participant in their medical care. Patient had no further questions or concerns at this time.   -Documentation of patient's health and condition was obtained from family member who was present during visit.  -Patient is an overall moderate risk for health complications from their medical conditions.       Follow up: Follow up in about 4 weeks (around 4/25/2023).  Keep previously scheduled appt this week.    After visit summary printed and given to patient upon discharge.  Patient goals and care plan are included in After visit summary.    Total medical decision making time was 44 min.  The following issues were discussed: The primary encounter diagnosis was RUIZ (dyspnea on exertion). Diagnoses of Dermatitis, Persistent atrial fibrillation, and Hyperparathyroidism were also pertinent to this visit.    Health maintenance needs, recent test results and goals of care discussed with pt and questions answered.

## 2023-03-28 NOTE — ASSESSMENT & PLAN NOTE
Lab Results   Component Value Date    .1 (H) 03/20/2023    CALCIUM 9.6 03/20/2023    PHOS 5.2 (H) 03/20/2023    PHOS 5.2 (H) 03/20/2023     Continue POC.

## 2023-03-29 ENCOUNTER — OFFICE VISIT (OUTPATIENT)
Dept: DERMATOLOGY | Facility: CLINIC | Age: 88
End: 2023-03-29
Payer: MEDICARE

## 2023-03-29 DIAGNOSIS — Z12.83 SCREENING, MALIGNANT NEOPLASM, SKIN: ICD-10-CM

## 2023-03-29 DIAGNOSIS — L98.9 DERMATOSIS: ICD-10-CM

## 2023-03-29 DIAGNOSIS — D22.9 MULTIPLE NEVI: ICD-10-CM

## 2023-03-29 DIAGNOSIS — Z85.828 HISTORY OF SKIN CANCER: ICD-10-CM

## 2023-03-29 DIAGNOSIS — D18.01 HEMANGIOMA OF SKIN: ICD-10-CM

## 2023-03-29 DIAGNOSIS — L90.5 SCAR CONDITIONS/SKIN FIBROSIS: Primary | ICD-10-CM

## 2023-03-29 PROCEDURE — 1101F PT FALLS ASSESS-DOCD LE1/YR: CPT | Mod: HCNC,CPTII,S$GLB, | Performed by: DERMATOLOGY

## 2023-03-29 PROCEDURE — 1159F MED LIST DOCD IN RCRD: CPT | Mod: HCNC,CPTII,S$GLB, | Performed by: DERMATOLOGY

## 2023-03-29 PROCEDURE — 3288F PR FALLS RISK ASSESSMENT DOCUMENTED: ICD-10-PCS | Mod: HCNC,CPTII,S$GLB, | Performed by: DERMATOLOGY

## 2023-03-29 PROCEDURE — 99214 PR OFFICE/OUTPT VISIT, EST, LEVL IV, 30-39 MIN: ICD-10-PCS | Mod: HCNC,S$GLB,, | Performed by: DERMATOLOGY

## 2023-03-29 PROCEDURE — 3288F FALL RISK ASSESSMENT DOCD: CPT | Mod: HCNC,CPTII,S$GLB, | Performed by: DERMATOLOGY

## 2023-03-29 PROCEDURE — 1160F PR REVIEW ALL MEDS BY PRESCRIBER/CLIN PHARMACIST DOCUMENTED: ICD-10-PCS | Mod: HCNC,CPTII,S$GLB, | Performed by: DERMATOLOGY

## 2023-03-29 PROCEDURE — 99999 PR PBB SHADOW E&M-EST. PATIENT-LVL III: CPT | Mod: PBBFAC,HCNC,, | Performed by: DERMATOLOGY

## 2023-03-29 PROCEDURE — 1101F PR PT FALLS ASSESS DOC 0-1 FALLS W/OUT INJ PAST YR: ICD-10-PCS | Mod: HCNC,CPTII,S$GLB, | Performed by: DERMATOLOGY

## 2023-03-29 PROCEDURE — 1126F PR PAIN SEVERITY QUANTIFIED, NO PAIN PRESENT: ICD-10-PCS | Mod: HCNC,CPTII,S$GLB, | Performed by: DERMATOLOGY

## 2023-03-29 PROCEDURE — 99999 PR PBB SHADOW E&M-EST. PATIENT-LVL III: ICD-10-PCS | Mod: PBBFAC,HCNC,, | Performed by: DERMATOLOGY

## 2023-03-29 PROCEDURE — 99214 OFFICE O/P EST MOD 30 MIN: CPT | Mod: HCNC,S$GLB,, | Performed by: DERMATOLOGY

## 2023-03-29 PROCEDURE — 1159F PR MEDICATION LIST DOCUMENTED IN MEDICAL RECORD: ICD-10-PCS | Mod: HCNC,CPTII,S$GLB, | Performed by: DERMATOLOGY

## 2023-03-29 PROCEDURE — 1126F AMNT PAIN NOTED NONE PRSNT: CPT | Mod: HCNC,CPTII,S$GLB, | Performed by: DERMATOLOGY

## 2023-03-29 PROCEDURE — 1160F RVW MEDS BY RX/DR IN RCRD: CPT | Mod: HCNC,CPTII,S$GLB, | Performed by: DERMATOLOGY

## 2023-03-29 NOTE — PATIENT INSTRUCTIONS
Preventing Skin Cancer  Relaxing in the sun may feel good. But it isnt good for your skin. In fact, being exposed to the suns harmful rays is a major cause of skin cancer. This is a serious disease that can be life-threatening. People of all ages and backgrounds are at risk. But in most cases, skin cancer can be prevented.    Your Role in Prevention  You can act today to help prevent skin cancer. Start by avoiding the suns UV (ultraviolet) rays. And dont use tanning beds, which are no safer than the sun. Taking these steps can help keep you from getting skin cancer. It can also help prevent wrinkles and other sun-induced aging effects. Make sure your children also follow these safeguards. Now is the time to start taking preventive steps against skin cancer.  When You Are Outdoors  Protect your skin when you go outdoors during the day. Take precautions whenever you go out to eat, run errands by car or on foot, or do any outdoor activity. There isnt just one easy way to protect your skin. Its best to follow all of these steps:  Wear tightly woven clothing that covers your skin. Put on a wide-brimmed hat to protect your face, ears, and scalp.  Watch the clock. Try to avoid the sun between 10 a.m. and 4 p.m., when it is strongest.  Head for the shade or create your own. Use an umbrella when sitting or strolling.  Know that the suns rays can reflect off sand, water, and snow. This can harm your skin. Take extra care when you are near reflective surfaces.  Keep in mind that even when the weather is hazy or cloudy, your skin can be exposed to strong UV rays.  Shield your skin with sunscreen. Also, apply sunscreen to your childrens skin.  Tips for Using Sunscreen  To help prevent skin cancer, choose the right sunscreen and use it correctly. Try the following tips:  Choose a sunscreen that has a sun protection factor (SPF) of at least 15. For the best protection, an SPF of at least 30 is preferred. Also, choose a  sunscreen labeled broad spectrum. This will shield you from both UVA and UVB (ultraviolet A and B) rays.  If one brand irritates your skin, try another, particularly ones without fragrance.  Use a water-resistant sunscreen if swimming or sweating.  Reapply sunscreen every 2 hours. If youre active, do this more often.  Cover any sun-exposed skin, from your face to your feet. Dont forget your ears and your lips.  Know that while sunscreen helps protect you, it isnt enough. You should also wear protective clothing. And try to stay out of the sun as much as you can, especially from 10 a.m. to 4 p.m.  © 2785-4832 [employer]. [address] [place] [address], [place] [address]. All rights reserved. This information is not intended as a substitute for professional medical care. Always follow your healthcare professional's instructions.

## 2023-03-29 NOTE — PROGRESS NOTES
Subjective:      Patient ID:  Shirley Metz is a 89 y.o. female who presents for   Chief Complaint   Patient presents with    Skin Check     FBSE    Rash located in chest pt is concerned about. Present about 5 days. Pt recently had a cardioversion.      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  She c/o rash that began after echo and cardioversion procedure.  Denies bleeding, tender, growing, or concerning lesions.     This is a high risk patient here to check for the development of new lesions.      Review of Systems   Constitutional:  Negative for fever and chills.   Gastrointestinal:  Negative for nausea and vomiting.   Skin:  Positive for activity-related sunscreen use. Negative for daily sunscreen use and recent sunburn.   Hematologic/Lymphatic: Does not bruise/bleed easily.     Objective:   Physical Exam   Constitutional: She appears well-developed and well-nourished. No distress.   Neurological: She is alert and oriented to person, place, and time. She is not disoriented.   Psychiatric: She has a normal mood and affect.   Skin:   Areas Examined (abnormalities noted in diagram):   Head / Face Inspection Performed  Neck Inspection Performed  Chest / Axilla Inspection Performed  Abdomen Inspection Performed  Back Inspection Performed  RUE Inspected  LUE Inspection Performed  RLE Inspected  LLE Inspection Performed  Nails and Digits Inspection Performed               Diagram Legend     Erythematous scaling macule/papule c/w actinic keratosis       Vascular papule c/w angioma      Pigmented verrucoid papule/plaque c/w seborrheic keratosis      Yellow umbilicated papule c/w sebaceous hyperplasia      Irregularly shaped tan macule c/w lentigo     1-2 mm smooth white papules  consistent with Milia      Movable subcutaneous cyst with punctum c/w epidermal inclusion cyst      Subcutaneous movable cyst c/w pilar cyst      Firm pink to brown papule c/w dermatofibroma      Pedunculated fleshy papule(s) c/w skin tag(s)      Evenly pigmented macule c/w junctional nevus     Mildly variegated pigmented, slightly irregular-bordered macule c/w mildly atypical nevus      Flesh colored to evenly pigmented papule c/w intradermal nevus       Pink pearly papule/plaque c/w basal cell carcinoma      Erythematous hyperkeratotic cursted plaque c/w SCC      Surgical scar with no sign of skin cancer recurrence      Open and closed comedones      Inflammatory papules and pustules      Verrucoid papule consistent consistent with wart     Erythematous eczematous patches and plaques     Dystrophic onycholytic nail with subungual debris c/w onychomycosis     Umbilicated papule    Erythematous-base heme-crusted tan verrucoid plaque consistent with inflamed seborrheic keratosis     Erythematous Silvery Scaling Plaque c/w Psoriasis     See annotation      Assessment / Plan:        Scar conditions/skin fibrosis  Screening, malignant neoplasm, skin  History of skin cancer  Scar of the left posterior shoulder, left mid-back, hx of NMSC.  No evidence of recurrence on physical exam today.  Continue routine skin surveillance. Daily sunscreen advised.    Dermatosis  Of chest, began after ECHO procedure and cardioversion.  Possible contact allergy to cleaning solution applied to area/uncertain etiology.  Offered topical steroid. Pt defers since improving.      Multiple nevi  Reassurance given.  Discussed ABCDEF of melanoma and changes for patient to look for.  AAD Handout given. Discussed importance of daily use of sunscreen which is broad-spectrum and has a minimum SPF of 30.    Hemangioma of skin  Reassurance given.  Lesions are benign.             Follow up in about 1 year (around 3/29/2024).

## 2023-03-31 ENCOUNTER — TELEPHONE (OUTPATIENT)
Dept: CARDIOLOGY | Facility: CLINIC | Age: 88
End: 2023-03-31
Payer: MEDICARE

## 2023-03-31 ENCOUNTER — TELEPHONE (OUTPATIENT)
Dept: PRIMARY CARE CLINIC | Facility: CLINIC | Age: 88
End: 2023-03-31
Payer: MEDICARE

## 2023-03-31 NOTE — TELEPHONE ENCOUNTER
Patient called in regarding her B/P readings: Yesterday: 93/47 pulse: 59; Today: 102/56 pulse: 32 then 118/53 pulse: 60 O2: 94 pulse 38 with Pulse Ox machine. Today: 107/57 pulse 63. She says she can come in on today to be seen. She was informed that Heather alves is not in office, you are booked but if you think she needs to be seen, she could see NP Pily Bravo. Patient verbally understood the information given.

## 2023-03-31 NOTE — TELEPHONE ENCOUNTER
Spoke with pt in [place] to wanting an sooner appointment .      Pt stated her b/p was 93/47 with HR 59 on yesterday    03/31/23 AM : 102/56 HR 38    03/31/23 Afternoon 107/57 HR 63      Pt denied chest pain, palpations or SOB       Pt next appt 04/12/23 but she stated she want's to be seen before then      Please advise

## 2023-03-31 NOTE — TELEPHONE ENCOUNTER
Spoke w Ms. Bettencourt - reports BP and pulse better now; 112/60 pulse 62.  She's feeling better and voice is stable over the phone.  Face pain that developed after cardioversion is resolving.    She did reach office of her cardiologist Dr. Ramirez and scheduled for f/u this Wednesday.    Advised if any sig concerns over the weekend to go to ED.     Advised hold losartan while BP running low.  Advised if pulse under 60 to 1/2 the metoprolol  Ms. Bettencourt expressed understanding and agreement with this plan

## 2023-03-31 NOTE — TELEPHONE ENCOUNTER
Called - no anser - could not leave message        ----- Message from Jerardo Jeffries sent at 3/31/2023 11:35 AM CDT -----  Contact: Shirley  .Type:  Sooner Apoointment Request    Caller is requesting a sooner appointment.  Caller declined first available appointment listed below.  Caller will not accept being placed on the waitlist and is requesting a message be sent to doctor.  Name of Caller:Shirley   When is the first available appointment?05/09/2023  Symptoms:follow up   Would the patient rather a call back or a response via [facility] Call   Best Call Back Number:[phone]  Additional Information: patient needing sooner appt

## 2023-04-03 ENCOUNTER — OFFICE VISIT (OUTPATIENT)
Dept: PRIMARY CARE CLINIC | Facility: CLINIC | Age: 88
End: 2023-04-03
Payer: MEDICARE

## 2023-04-03 ENCOUNTER — TELEPHONE (OUTPATIENT)
Dept: PRIMARY CARE CLINIC | Facility: CLINIC | Age: 88
End: 2023-04-03
Payer: MEDICARE

## 2023-04-03 VITALS
DIASTOLIC BLOOD PRESSURE: 61 MMHG | WEIGHT: 177.13 LBS | OXYGEN SATURATION: 95 % | TEMPERATURE: 98 F | HEART RATE: 66 BPM | BODY MASS INDEX: 30.24 KG/M2 | HEIGHT: 64 IN | SYSTOLIC BLOOD PRESSURE: 136 MMHG

## 2023-04-03 DIAGNOSIS — I10 PRIMARY HYPERTENSION: Primary | Chronic | ICD-10-CM

## 2023-04-03 DIAGNOSIS — I48.0 PAF (PAROXYSMAL ATRIAL FIBRILLATION): Chronic | ICD-10-CM

## 2023-04-03 DIAGNOSIS — I13.2 HYPERTENSIVE HEART AND RENAL DISEASE WITH BOTH (CONGESTIVE) HEART FAILURE AND RENAL FAILURE: ICD-10-CM

## 2023-04-03 DIAGNOSIS — N19 HYPERTENSIVE HEART AND RENAL DISEASE WITH BOTH (CONGESTIVE) HEART FAILURE AND RENAL FAILURE: ICD-10-CM

## 2023-04-03 DIAGNOSIS — I50.33 ACUTE ON CHRONIC DIASTOLIC CONGESTIVE HEART FAILURE: ICD-10-CM

## 2023-04-03 DIAGNOSIS — R94.31 ABNORMAL ECG: ICD-10-CM

## 2023-04-03 DIAGNOSIS — I48.91 ATRIAL FIBRILLATION WITH RVR: ICD-10-CM

## 2023-04-03 DIAGNOSIS — Z79.01 CHRONIC ANTICOAGULATION: ICD-10-CM

## 2023-04-03 LAB
ALBUMIN SERPL BCP-MCNC: 3.9 G/DL (ref 3.5–5.2)
ANION GAP SERPL CALC-SCNC: 11 MMOL/L (ref 8–16)
BUN SERPL-MCNC: 36 MG/DL (ref 8–23)
CALCIUM SERPL-MCNC: 9.8 MG/DL (ref 8.7–10.5)
CHLORIDE SERPL-SCNC: 103 MMOL/L (ref 95–110)
CO2 SERPL-SCNC: 29 MMOL/L (ref 23–29)
CREAT SERPL-MCNC: 1.9 MG/DL (ref 0.5–1.4)
EST. GFR  (NO RACE VARIABLE): 24.8 ML/MIN/1.73 M^2
GLUCOSE SERPL-MCNC: 117 MG/DL (ref 70–110)
MAGNESIUM SERPL-MCNC: 2.2 MG/DL (ref 1.6–2.6)
PHOSPHATE SERPL-MCNC: 3.4 MG/DL (ref 2.7–4.5)
POTASSIUM SERPL-SCNC: 3.6 MMOL/L (ref 3.5–5.1)
SODIUM SERPL-SCNC: 143 MMOL/L (ref 136–145)

## 2023-04-03 PROCEDURE — 3288F PR FALLS RISK ASSESSMENT DOCUMENTED: ICD-10-PCS | Mod: CPTII,S$GLB,, | Performed by: NURSE PRACTITIONER

## 2023-04-03 PROCEDURE — 3288F FALL RISK ASSESSMENT DOCD: CPT | Mod: CPTII,S$GLB,, | Performed by: NURSE PRACTITIONER

## 2023-04-03 PROCEDURE — 83735 ASSAY OF MAGNESIUM: CPT | Performed by: NURSE PRACTITIONER

## 2023-04-03 PROCEDURE — 1101F PR PT FALLS ASSESS DOC 0-1 FALLS W/OUT INJ PAST YR: ICD-10-PCS | Mod: CPTII,S$GLB,, | Performed by: NURSE PRACTITIONER

## 2023-04-03 PROCEDURE — 93010 ELECTROCARDIOGRAM REPORT: CPT | Mod: S$GLB,,, | Performed by: INTERNAL MEDICINE

## 2023-04-03 PROCEDURE — 93005 EKG 12-LEAD: ICD-10-PCS | Mod: S$GLB,,, | Performed by: NURSE PRACTITIONER

## 2023-04-03 PROCEDURE — 99999 PR PBB SHADOW E&M-EST. PATIENT-LVL V: ICD-10-PCS | Mod: PBBFAC,,, | Performed by: NURSE PRACTITIONER

## 2023-04-03 PROCEDURE — 93010 EKG 12-LEAD: ICD-10-PCS | Mod: S$GLB,,, | Performed by: INTERNAL MEDICINE

## 2023-04-03 PROCEDURE — 93005 ELECTROCARDIOGRAM TRACING: CPT | Mod: S$GLB,,, | Performed by: NURSE PRACTITIONER

## 2023-04-03 PROCEDURE — 1159F MED LIST DOCD IN RCRD: CPT | Mod: CPTII,S$GLB,, | Performed by: NURSE PRACTITIONER

## 2023-04-03 PROCEDURE — 99999 PR PBB SHADOW E&M-EST. PATIENT-LVL V: CPT | Mod: PBBFAC,,, | Performed by: NURSE PRACTITIONER

## 2023-04-03 PROCEDURE — 99215 PR OFFICE/OUTPT VISIT, EST, LEVL V, 40-54 MIN: ICD-10-PCS | Mod: S$GLB,,, | Performed by: NURSE PRACTITIONER

## 2023-04-03 PROCEDURE — 1126F AMNT PAIN NOTED NONE PRSNT: CPT | Mod: CPTII,S$GLB,, | Performed by: NURSE PRACTITIONER

## 2023-04-03 PROCEDURE — 1126F PR PAIN SEVERITY QUANTIFIED, NO PAIN PRESENT: ICD-10-PCS | Mod: CPTII,S$GLB,, | Performed by: NURSE PRACTITIONER

## 2023-04-03 PROCEDURE — 1159F PR MEDICATION LIST DOCUMENTED IN MEDICAL RECORD: ICD-10-PCS | Mod: CPTII,S$GLB,, | Performed by: NURSE PRACTITIONER

## 2023-04-03 PROCEDURE — 80069 RENAL FUNCTION PANEL: CPT | Performed by: NURSE PRACTITIONER

## 2023-04-03 PROCEDURE — 1101F PT FALLS ASSESS-DOCD LE1/YR: CPT | Mod: CPTII,S$GLB,, | Performed by: NURSE PRACTITIONER

## 2023-04-03 PROCEDURE — 99215 OFFICE O/P EST HI 40 MIN: CPT | Mod: S$GLB,,, | Performed by: NURSE PRACTITIONER

## 2023-04-03 RX ORDER — FUROSEMIDE 40 MG/1
40 TABLET ORAL DAILY
Qty: 90 TABLET | Refills: 1 | Status: SHIPPED | OUTPATIENT
Start: 2023-04-03 | End: 2023-04-26

## 2023-04-03 RX ORDER — METOPROLOL TARTRATE 25 MG/1
12.5 TABLET, FILM COATED ORAL 2 TIMES DAILY
Qty: 90 TABLET | Refills: 3 | Status: SHIPPED | OUTPATIENT
Start: 2023-04-03 | End: 2023-04-26

## 2023-04-03 RX ORDER — LOSARTAN POTASSIUM 25 MG/1
25 TABLET ORAL DAILY
Qty: 90 TABLET | Refills: 1
Start: 2023-04-03 | End: 2023-04-19

## 2023-04-03 NOTE — PROGRESS NOTES
Shirley Metz  04/03/2023  [id number]    Phylicia Deleon MD  Patient Care Team:  Phylicia Deleon MD as PCP - General (Internal Medicine)  Wilbert Ware MD as Consulting Physician (Ophthalmology)  Rajinder Quintanilla MD as Consulting Physician (Ophthalmology)  Suzan Gregorio PA-C as Physician Assistant (Rheumatology)  Rosario Valadez MD as Consulting Physician (Dermatology)  Trevon Ramirez MD as Consulting Physician (Cardiology)  Amish Mendoza MD (Pulmonary Disease)  Jaquan Choi MD as Consulting Physician (Vascular Surgery)  Debbie Choi MD (Inactive) as Consulting Physician (Gynecology)  Emmanuel Fish MD as Consulting Physician (Hand Surgery)  PAXTON Chaidez Jr., MD as Consulting Physician (Orthopedic Surgery)  Phylicia Deleon MD as Physician (Internal Medicine)  Heather Miller NP as Nurse Practitioner (Family Medicine)      [facility] Note      Chief Complaint:  Chief Complaint   Patient presents with    Dizziness    B/P issues        History of Present Illness:  HPI    Labile home BP.   Last week pt decreased PM lasix dose herself.   Increased RUIZ last week. Declined further work up.  S/P STACEY with  successful cardioversion 3/21/23.    Took 1/2 dose losartan PM 4/1 - low BP the following AM.  Home HR 32, 54, 63, 59, 57.   Home BP 85/42, 95/45, 102/56, 105/49, 106/52.   Took no meds this AM.     Feels some frontal head pressure/brain fog. Onset about a week ago. No dizziness or syncope/near syncope. No injuries or falls. Sore area to gum left buccal? Rinsing with salt water, some relief.     Still some RUIZ but this is getting better        Review of Systems   Constitutional:  Negative for chills, fever and malaise/fatigue.   Respiratory:  Positive for shortness of breath. Negative for cough, sputum production and wheezing.    Cardiovascular:  Negative for chest pain and leg swelling.   Gastrointestinal:  Negative for abdominal pain, constipation, diarrhea,  nausea and vomiting.   Genitourinary:  Negative for dysuria and hematuria.   Musculoskeletal:  Negative for falls.   Neurological:  Negative for dizziness, weakness and headaches.   Psychiatric/Behavioral:  The patient is not nervous/anxious and does not have insomnia.        The following were reviewed: Active problem list, medication list, allergies, family history, social history, and Health Maintenance.     History:  Past Medical History:   Diagnosis Date    Anemia 11/29/2018    Anxiety 11/28/2017    Arthritis     Atrial fibrillation with RVR 10/9/2019    Back pain     Basal cell carcinoma 03/29/2018    left mid back    Chronic diastolic congestive heart failure 10/15/2019    CKD (chronic kidney disease) stage 3, GFR 30-59 ml/min 8/8/2016    Colon polyps     reported per patient.     Coronary artery calcification 10/5/2021    Fuchs' corneal dystrophy     General anesthetics causing adverse effect in therapeutic use     woke up during surgery and gagged on ET tube    Glaucoma     Glaucoma     Heart failure with preserved left ventricular function (HFpEF) 7/24/2018    Hyperlipidemia     Hypertension     Macular degeneration dry    Obesity     PVD (peripheral vascular disease) 4/26/2021    PVD (peripheral vascular disease) 4/26/2021    Squamous cell carcinoma 01/08/2019    left shoulder    Stenosis of carotid artery 10/5/2021    Trouble in sleeping     Type 2 diabetes mellitus without complication, without long-term current use of insulin 2/24/2021    Urinary tract infection      Past Surgical History:   Procedure Laterality Date    ABLATION OF ARRHYTHMOGENIC FOCUS FOR ATRIAL FIBRILLATION N/A 11/3/2022    Procedure: Ablation atrial fibrillation;  Surgeon: Toi Kelly MD;  Location: [facility];  Service: Cardiology;  Laterality: N/A;  afib, PVI/CTI, RFA, STACEY (cx if SR), DEDE, ABDIEL orantes, 3 Prep    ADENOIDECTOMY  1938    BREAST BIOPSY Left     1997. benign    BREAST CYST EXCISION Left 1997 approx    CARPAL  TUNNEL RELEASE Right 2012 approx    CATARACT EXTRACTION Bilateral 1994    CHOLECYSTECTOMY  1975    CORNEAL TRANSPLANT Bilateral 08/2015 8/2015 - left; Right 4/2016    EYE SURGERY      Fuch's Corneal dystrophy - had 2nd operation with Dr. Quintanilla    EYE SURGERY      Cataract wIOL    left thumb Left 2011    removed cuboid for arthritis    REVERSE TOTAL SHOULDER ARTHROPLASTY Left 8/21/2018    Procedure: ARTHROPLASTY, SHOULDER, TOTAL, REVERSE;  Surgeon: Solomon Lujan MD;  Location: [facility] OR;  Service: Orthopedics;  Laterality: Left;  WITH BICEP TENODESIS    SKIN CANCER EXCISION Left 2017    BCC removal by Dr. Valadez    SLT- OS (aka TRABECULOPLASTY) Left 05/11/2011    repeat 3/14/12    TENOTOMY Left 8/21/2018    Procedure: TENOTOMY;  Surgeon: Solomon Lujan MD;  Location: [facility] OR;  Service: Orthopedics;  Laterality: Left;    TONSILLECTOMY  1938    TRANSESOPHAGEAL ECHOCARDIOGRAM WITH POSSIBLE CARDIOVERSION (STACEY W/ POSS CARDIOVERSION) N/A 3/21/2023    Procedure: Transesophageal echo (STACEY) intra-procedure log documentation;  Surgeon: Trevon Ramirez MD;  Location: [facility] CATH LAB;  Service: Cardiology;  Laterality: N/A;    TREATMENT OF CARDIAC ARRHYTHMIA N/A 10/10/2019    Procedure: CARDIOVERSION;  Surgeon: Pete Durán MD;  Location: [facility] CATH LAB;  Service: Cardiology;  Laterality: N/A;    TREATMENT OF CARDIAC ARRHYTHMIA N/A 12/6/2019    Procedure: CARDIOVERSION;  Surgeon: Samy Castillo MD;  Location: [facility] CATH LAB;  Service: Cardiology;  Laterality: N/A;     Family History   Problem Relation Age of Onset    Heart disease Mother     Hypertension Mother     Cancer Father 88        sarcoma( ?Kaposi's ) on leg    Deep vein thrombosis Neg Hx     Ovarian cancer Neg Hx     Breast cancer Neg Hx     Kidney disease Neg Hx     Strabismus Neg Hx     Retinal detachment Neg Hx     Macular degeneration Neg Hx     Glaucoma Neg Hx     Blindness Neg Hx     Amblyopia Neg Hx     Eczema Neg Hx     Lupus Neg Hx     Melanoma Neg Hx      Psoriasis Neg Hx     Diabetes Neg Hx     Stroke Neg Hx     Mental retardation Neg Hx     Mental illness Neg Hx     Hyperlipidemia Neg Hx     COPD Neg Hx     Asthma Neg Hx     Depression Neg Hx     Alcohol abuse Neg Hx     Drug abuse Neg Hx      Social History     Socioeconomic History    Marital status:     Number of children: 3   Tobacco Use    Smoking status: Never    Smokeless tobacco: Never    Tobacco comments:     history of passive smoking from her ex-   Substance and Sexual Activity    Alcohol use: Yes     Alcohol/week: 2.0 standard drinks     Types: 2 Standard drinks or equivalent per week     Comment: occ    Drug use: No    Sexual activity: Not Currently     Partners: Male     Birth control/protection: Post-menopausal     Comment: discussed protection for STD's   Other Topics Concern    Are you pregnant or think you may be? No    Breast-feeding No   Social History Narrative     x 2,  x 1. Retired artist - previously doing jewelry/wall pieces; ; lives in [place]; has 3 sons - 52( lives in [place], 54, and 56;exercises minimally - limited due to back issues. Still drives. Does have a Living Will.     Social Determinants of Health     Physical Activity: Unknown    Days of Exercise per Week: Patient refused    Minutes of Exercise per Session: 10 min   Stress: No Stress Concern Present    Feeling of Stress : Not at all     Patient Active Problem List   Diagnosis    HTN (hypertension)    Mixed hyperlipidemia    Primary osteoarthritis involving multiple joints    Macular degeneration - Both Eyes    Fuch's endothelial dystrophy - Both Eyes    Lens replaced by other means    COAG (chronic open-angle glaucoma) - Both Eyes    Blepharitis, unspecified - Both Eyes    RUIZ (dyspnea on exertion)    Abnormal ECG    PVC's (premature ventricular contractions)    Other microscopic hematuria    LVH (left ventricular hypertrophy) due to hypertensive disease    Osteopenia of  hip    Myalgia    Calcification of aorta    Neuropathy    Unequal blood pressures in paired extremities    Medication side effects    History of cornea transplant    Pseudophakia    Insomnia    Anxiety    Pre-diabetes    Obesity (BMI 30.0-34.9)    Elevated troponin    Bilateral shoulder region arthritis    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    Anemia    New onset a-fib    Thrombus of left atrial appendage    Persistent atrial fibrillation    Elevated liver enzymes    Atrial flutter with rapid ventricular response    Chronic anticoagulation    Keratitis sicca, bilateral    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    PAD (peripheral artery disease)    Statin intolerance    Pulmonary nodules/lesions, multiple    Spinal stenosis at L4-L5 level    Pulmonary granuloma    Lumbar spondylosis    Bilateral recurrent inguinal hernia without obstruction or gangrene    LLQ abdominal pain    Pain in left leg    Rectal abnormality    Stenosis of carotid artery    Carotid artery calcification, bilateral    B12 deficiency    Seasonal allergic rhinitis    Atrial fibrillation with RVR    Elevated serum creatinine    Coagulopathy    Atheroma of artery    Acute on chronic diastolic congestive heart failure    Troponin level elevated    Hyperparathyroidism     Review of patient's allergies indicates:   Allergen Reactions    Carvedilol Swelling     Lip swelling    Cephalexin Other (See Comments)     Muscle/joint weakness unable to move     Doxycycline Shortness Of Breath, Nausea And Vomiting and Other (See Comments)     weakness    Erythromycin Other (See Comments)     Complete weakness/could not walk    Gadolinium-containing contrast media Swelling     angioedema    Hydrocodone-acetaminophen Shortness Of Breath     wheezing    Inveltys [loteprednol etabonate] Palpitations and Other (See Comments)     Patient stated bp went up.    Labetalol Shortness Of Breath, Nausea Only,  Palpitations and Other (See Comments)     weakness    Loratadine Other (See Comments)     Double vision/spots  Other reaction(s): double vision    Naproxen      wheezing  wheezing  wheezing  Other reaction(s): wheezing    Statins-hmg-coa reductase inhibitors Other (See Comments)     Severe Muscle weakness  Muscle weakness  Severe Muscle weakness  Other reaction(s): severe muscle weakness    Amlodipine Other (See Comments)     Myalgias    Fenofibrate Other (See Comments)     muscle weakness      Hydralazine Other (See Comments)     muscle tremors    Hydralazine analogues Other (See Comments)     Muscle tremors/aches      Loteprednol Other (See Comments)     increased BP    Macrolide antibiotics Other (See Comments)     Complete weakness/could not walk      Moxifloxacin Hives and Other (See Comments)     muscle soreness    Timolol Other (See Comments)     Blurred vision, Burning eyes, Severe muscle weakness, eye problems.      Verapamil Diarrhea     Severe diarrhea.      Hydrocortisone     Isosorbide      Tolerates Imdur    Tetanus and diphtheria toxoids     Adhesive Rash     Clonidine patch--contact dermatitis    Codeine Diarrhea and Other (See Comments)     Loss of balance       Doxazosin Palpitations    Fexofenadine Other (See Comments)     Double vision/spots       Hydrocortisone (bulk) Other (See Comments)     Only suppository/ caused muscle weakness    Latanoprost Other (See Comments)     Muscle weakness      Olopatadine Other (See Comments)     Muscle weakness      Prednisone Anxiety       Medications:  Current Outpatient Medications on File Prior to Visit   Medication Sig Dispense Refill    acetaminophen (TYLENOL) 500 MG tablet Take 500 mg by mouth nightly as needed.      ALPRAZolam (XANAX) 0.5 MG tablet Take 1 tablet (0.5 mg total) by mouth nightly as needed for Anxiety. 30 tablet 5    apixaban (ELIQUIS) 2.5 mg Tab Take 1 tablet (2.5 mg total) by mouth 2 (two) times daily. 180 tablet 3    b complex vitamins  capsule Take 1 capsule by mouth once daily.      brinzolamide (AZOPT) 1 % ophthalmic suspension Place 1 drop into the left eye 2 (two) times a day. (Patient taking differently: Place 1 drop into the left eye every morning.) 5 mL 12    cholecalciferol, vitamin D3, (VITAMIN D3) 50 mcg (2,000 unit) Cap Take 1 capsule by mouth once daily.      coenzyme Q10 200 mg capsule Take 200 mg by mouth once daily.      fish oil-omega-3 fatty acids 300-1,000 mg capsule Take 1 capsule by mouth once daily.      POLYETHYLENE GLYCOL 3350 (MIRALAX ORAL) Take 17 g by mouth 2 (two) times a day. Taking BID      RHOPRESSA 0.02 % ophthalmic solution PLACE 1 DROP INTO THE LEFT EYE EVERY EVENING. 2.5 mL 11    sars-cov-2, covid-19 pfizer omicron, (PFIZER COVID BIVAL,12Y UP,,PF,) 30 mcg/0.3 mL injection Inject into the muscle. 0.3 mL 0    TRAVATAN Z 0.004 % ophthalmic solution Place 1 drop into the left eye every evening. 2.5 mL 12    [DISCONTINUED] furosemide (LASIX) 40 MG tablet Take 1 tablet (40 mg total) by mouth 2 (two) times a day. 60 tablet 5    [DISCONTINUED] cloNIDine (CATAPRES) 0.1 MG tablet Take 1 tablet (0.1 mg total) by mouth 2 (two) times daily as needed (systolic BP over 180). 20 tablet 1    [DISCONTINUED] isosorbide mononitrate (IMDUR) 30 MG 24 hr tablet Take 1 tablet (30 mg total) by mouth every evening. 90 tablet 1    [DISCONTINUED] losartan (COZAAR) 25 MG tablet Take 1 tablet (25 mg total) by mouth once daily. (Patient not taking: Reported on 4/3/2023) 180 tablet 1    [DISCONTINUED] metoprolol tartrate (LOPRESSOR) 25 MG tablet Take 1 tablet (25 mg total) by mouth 2 (two) times daily. (Patient not taking: Reported on 4/3/2023) 180 tablet 3     Current Facility-Administered Medications on File Prior to Visit   Medication Dose Route Frequency Provider Last Rate Last Admin    sodium chloride 0.9% flush 3 mL  3 mL Intravenous PRN Steve Galarza PA-C        vancomycin in dextrose 5 % 1 gram/250 mL IVPB 1,000 mg  1,000 mg  Intravenous On Call Procedure Vianney Fountain NP           Medications have been reviewed and reconciled with patient at visit today.    Barriers to medications present (no )    Fall since last office visit (no )      Exam:  Vitals:    04/03/23 1445   BP: 136/61   Pulse: 66   Temp: 98.2 °F (36.8 °C)     Weight: 80.3 kg (177 lb 1.6 oz)   Body mass index is 30.4 kg/m².      BP Readings from Last 3 Encounters:   04/03/23 136/61   03/28/23 139/62   03/21/23 (!) 105/44     Wt Readings from Last 3 Encounters:   04/03/23 1445 80.3 kg (177 lb 1.6 oz)   03/28/23 1056 81 kg (178 lb 8 oz)   03/21/23 1156 81.6 kg (180 lb)   03/21/23 1051 82 kg (180 lb 12.4 oz)            Physical Exam  Constitutional:       General: She is not in acute distress.  HENT:      Left Ear: Tympanic membrane normal.      Ears:      Comments: Right TM sl dull     Nose: Nose normal.      Mouth/Throat:      Mouth: Mucous membranes are moist.   Eyes:      General: No scleral icterus.  Cardiovascular:      Rate and Rhythm: Normal rate. Rhythm irregular.      Heart sounds: Normal heart sounds.   Pulmonary:      Effort: Pulmonary effort is normal. No respiratory distress.      Breath sounds: Normal breath sounds.   Abdominal:      General: There is no distension.      Palpations: Abdomen is soft.      Tenderness: There is no abdominal tenderness.   Musculoskeletal:         General: No swelling.      Cervical back: Neck supple.   Skin:     General: Skin is warm and dry.   Neurological:      Mental Status: She is alert. Mental status is at baseline.   Psychiatric:         Mood and Affect: Mood normal.         Behavior: Behavior normal.       Laboratory Reviewed: (Yes)  Lab Results   Component Value Date    WBC 6.63 01/12/2023    HGB 11.5 (L) 01/12/2023    HCT 35.6 (L) 01/12/2023     01/12/2023    CHOL 168 08/31/2022    TRIG 98 08/31/2022    HDL 43 08/31/2022    ALT 16 01/12/2023    AST 21 01/12/2023     03/20/2023    K 3.6 03/20/2023    CL  101 03/20/2023    CREATININE 2.3 (H) 03/20/2023    BUN 45 (H) 03/20/2023    CO2 27 03/20/2023    TSH 3.826 08/31/2022    INR 1.1 10/25/2022    HGBA1C 7.0 (H) 03/20/2023           Health Maintenance  Health Maintenance Topics with due status: Not Due       Topic Last Completion Date    Lipid Panel 08/31/2022    Hemoglobin A1c (Prediabetes) 03/20/2023     Health Maintenance Due   Topic Date Due    Shingles Vaccine (1 of 2) Never done    TETANUS VACCINE  11/09/2021    Pneumococcal Vaccines (Age 65+) (2 - PCV) 03/16/2023       Assessment:  Problem List Items Addressed This Visit          Cardiac/Vascular    HTN (hypertension) - Primary (Chronic)    Relevant Medications    losartan (COZAAR) 25 MG tablet    Other Relevant Orders    IN OFFICE EKG 12-LEAD (to Muse)    Magnesium    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   Decrease dose of metoprolol due to bradycardia, decrease furosemide to 40 mg daily.   Continue losartan. Keep appt with cardiology this week.              Relevant Orders    RENAL FUNCTION PANEL    Magnesium    Acute on chronic diastolic congestive heart failure     Decrease furosemide dose to once daily.              Relevant Medications    furosemide (LASIX) 40 MG tablet    Abnormal ECG     Sinus arrythmia with long QTc today.   Episodes of bradycardia and low BP at home, general sx.   Decrease metoprolol dose. RFP and Mg today.   Meds reviewed.   F/U with cardiology this week, here in two weeks.   Call EMS if sx worsen.            Atrial fibrillation with RVR       Hematology    Chronic anticoagulation     No sx of bleeding. Monitor.            Other Visit Diagnoses       PAF (paroxysmal atrial fibrillation)  (Chronic)       Relevant Medications    metoprolol tartrate (LOPRESSOR) 25 MG tablet              Plan:  Primary hypertension  -     IN OFFICE EKG 12-LEAD (to Muse)  -     losartan (COZAAR) 25 MG tablet; Take 1 tablet (25 mg total)  by mouth once daily.  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  Dispense: 90 tablet; Refill: 3    Acute on chronic diastolic congestive heart failure  -     furosemide (LASIX) 40 MG tablet; Take 1 tablet (40 mg total) by mouth once daily.  Dispense: 90 tablet; Refill: 1    Chronic anticoagulation    Abnormal ECG    Atrial fibrillation with RVR  -     Ambulatory referral/consult to [facility]      -Patient's lab results were reviewed and discussed with patient  -Treatment options and alternatives were discussed with the patient. Patient expressed understanding. Patient was given the opportunity to ask questions and be an active participant in their medical care. Patient had no further questions or concerns at this time.   -Documentation of patient's health and condition was obtained from family member who was present during visit.  -Patient is an overall moderate risk for health complications from their medical conditions.       Follow up: Follow up in about 2 weeks (around 4/17/2023).      After visit summary printed and given to patient upon discharge.  Patient goals and care plan are included in After visit summary.    Total medical decision making time was 41 min.  The following issues were discussed: The primary encounter diagnosis was Primary hypertension. 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    Health maintenance needs, recent test results and goals of care discussed with pt and questions answered.

## 2023-04-03 NOTE — ASSESSMENT & PLAN NOTE
Sinus arrythmia with long QTc today.   Episodes of bradycardia and low BP at home, general sx.   Decrease metoprolol dose. RFP and Mg today.   Meds reviewed.   F/U with cardiology this week, here in two weeks.   Call EMS if sx worsen.

## 2023-04-03 NOTE — Clinical Note
Good afternoon - Ms Vera is having episodes of bradycardia and low BP at home. I've reduced her metoprolol and furosemide doses and asked her to hold losartan for now. EKG shows sinus arrythmia and long QTc 579. Some generalized fatigue and brain cloudiness but no dizziness or near syncope. She sees you Wednesday. If you have any other recommendations they are appreciated. Thank you

## 2023-04-03 NOTE — ASSESSMENT & PLAN NOTE
BP labile.   Decrease dose of metoprolol due to bradycardia, decrease furosemide to 40 mg daily.   Continue losartan. Keep appt with cardiology this week.

## 2023-04-03 NOTE — PATIENT INSTRUCTIONS
Decrease furosemide and metoprolol doses as discussed.    Hold losartan for now.     Lab today.

## 2023-04-03 NOTE — TELEPHONE ENCOUNTER
Pt states blood pressure has been fluctuating up and down. Noon yesterday 85/42 60    Last night 145/75 63. Did not take Losartan.     150/87 71- 6am this morning- did not take metoprolol    106/61 72 9am this morning

## 2023-04-05 ENCOUNTER — OFFICE VISIT (OUTPATIENT)
Dept: CARDIOLOGY | Facility: CLINIC | Age: 88
End: 2023-04-05
Payer: MEDICARE

## 2023-04-05 ENCOUNTER — LAB VISIT (OUTPATIENT)
Dept: LAB | Facility: HOSPITAL | Age: 88
End: 2023-04-05
Attending: INTERNAL MEDICINE
Payer: MEDICARE

## 2023-04-05 VITALS
OXYGEN SATURATION: 89 % | BODY MASS INDEX: 30.6 KG/M2 | WEIGHT: 179.25 LBS | HEART RATE: 58 BPM | SYSTOLIC BLOOD PRESSURE: 128 MMHG | HEIGHT: 64 IN | DIASTOLIC BLOOD PRESSURE: 62 MMHG

## 2023-04-05 DIAGNOSIS — I50.32 CHRONIC HEART FAILURE WITH PRESERVED EJECTION FRACTION: ICD-10-CM

## 2023-04-05 DIAGNOSIS — I10 PRIMARY HYPERTENSION: ICD-10-CM

## 2023-04-05 DIAGNOSIS — E78.2 MIXED HYPERLIPIDEMIA: ICD-10-CM

## 2023-04-05 DIAGNOSIS — I48.92 ATRIAL FLUTTER WITH RAPID VENTRICULAR RESPONSE: ICD-10-CM

## 2023-04-05 DIAGNOSIS — I73.9 PAD (PERIPHERAL ARTERY DISEASE): ICD-10-CM

## 2023-04-05 DIAGNOSIS — I49.3 PVC'S (PREMATURE VENTRICULAR CONTRACTIONS): ICD-10-CM

## 2023-04-05 DIAGNOSIS — I11.9 HYPERTENSIVE LEFT VENTRICULAR HYPERTROPHY, WITHOUT HEART FAILURE: ICD-10-CM

## 2023-04-05 DIAGNOSIS — Z86.79 S/P ABLATION OF ATRIAL FIBRILLATION: ICD-10-CM

## 2023-04-05 DIAGNOSIS — I12.9 CKD STAGE 4 SECONDARY TO HYPERTENSION: ICD-10-CM

## 2023-04-05 DIAGNOSIS — I65.23 CAROTID ARTERY CALCIFICATION, BILATERAL: ICD-10-CM

## 2023-04-05 DIAGNOSIS — I48.0 PAF (PAROXYSMAL ATRIAL FIBRILLATION): Primary | ICD-10-CM

## 2023-04-05 DIAGNOSIS — N18.4 CKD STAGE 4 SECONDARY TO HYPERTENSION: ICD-10-CM

## 2023-04-05 DIAGNOSIS — I70.0 CALCIFICATION OF AORTA: ICD-10-CM

## 2023-04-05 DIAGNOSIS — Z98.890 S/P ABLATION OF ATRIAL FIBRILLATION: ICD-10-CM

## 2023-04-05 DIAGNOSIS — I48.91 ATRIAL FIBRILLATION WITH RVR: ICD-10-CM

## 2023-04-05 LAB
BNP SERPL-MCNC: 508 PG/ML (ref 0–99)
OHS CV HOLTER SINUS AVERAGE HR: 75
OHS CV HOLTER SINUS MAX HR: 128
OHS CV HOLTER SINUS MIN HR: 52

## 2023-04-05 PROCEDURE — 1125F AMNT PAIN NOTED PAIN PRSNT: CPT | Mod: CPTII,S$GLB,, | Performed by: INTERNAL MEDICINE

## 2023-04-05 PROCEDURE — 36415 COLL VENOUS BLD VENIPUNCTURE: CPT | Performed by: INTERNAL MEDICINE

## 2023-04-05 PROCEDURE — 3288F FALL RISK ASSESSMENT DOCD: CPT | Mod: CPTII,S$GLB,, | Performed by: INTERNAL MEDICINE

## 2023-04-05 PROCEDURE — 1159F MED LIST DOCD IN RCRD: CPT | Mod: CPTII,S$GLB,, | Performed by: INTERNAL MEDICINE

## 2023-04-05 PROCEDURE — 99999 PR PBB SHADOW E&M-EST. PATIENT-LVL IV: CPT | Mod: PBBFAC,,, | Performed by: INTERNAL MEDICINE

## 2023-04-05 PROCEDURE — 3288F PR FALLS RISK ASSESSMENT DOCUMENTED: ICD-10-PCS | Mod: CPTII,S$GLB,, | Performed by: INTERNAL MEDICINE

## 2023-04-05 PROCEDURE — 1160F PR REVIEW ALL MEDS BY PRESCRIBER/CLIN PHARMACIST DOCUMENTED: ICD-10-PCS | Mod: CPTII,S$GLB,, | Performed by: INTERNAL MEDICINE

## 2023-04-05 PROCEDURE — 1125F PR PAIN SEVERITY QUANTIFIED, PAIN PRESENT: ICD-10-PCS | Mod: CPTII,S$GLB,, | Performed by: INTERNAL MEDICINE

## 2023-04-05 PROCEDURE — 99214 OFFICE O/P EST MOD 30 MIN: CPT | Mod: S$GLB,,, | Performed by: INTERNAL MEDICINE

## 2023-04-05 PROCEDURE — 83880 ASSAY OF NATRIURETIC PEPTIDE: CPT | Performed by: INTERNAL MEDICINE

## 2023-04-05 PROCEDURE — 1101F PT FALLS ASSESS-DOCD LE1/YR: CPT | Mod: CPTII,S$GLB,, | Performed by: INTERNAL MEDICINE

## 2023-04-05 PROCEDURE — 99999 PR PBB SHADOW E&M-EST. PATIENT-LVL IV: ICD-10-PCS | Mod: PBBFAC,,, | Performed by: INTERNAL MEDICINE

## 2023-04-05 PROCEDURE — 99214 PR OFFICE/OUTPT VISIT, EST, LEVL IV, 30-39 MIN: ICD-10-PCS | Mod: S$GLB,,, | Performed by: INTERNAL MEDICINE

## 2023-04-05 PROCEDURE — 1101F PR PT FALLS ASSESS DOC 0-1 FALLS W/OUT INJ PAST YR: ICD-10-PCS | Mod: CPTII,S$GLB,, | Performed by: INTERNAL MEDICINE

## 2023-04-05 PROCEDURE — 1159F PR MEDICATION LIST DOCUMENTED IN MEDICAL RECORD: ICD-10-PCS | Mod: CPTII,S$GLB,, | Performed by: INTERNAL MEDICINE

## 2023-04-05 PROCEDURE — 1160F RVW MEDS BY RX/DR IN RCRD: CPT | Mod: CPTII,S$GLB,, | Performed by: INTERNAL MEDICINE

## 2023-04-05 RX ORDER — MIDODRINE HYDROCHLORIDE 5 MG/1
5 TABLET ORAL 3 TIMES DAILY PRN
Qty: 180 TABLET | Refills: 1 | Status: SHIPPED | OUTPATIENT
Start: 2023-04-05 | End: 2023-05-16 | Stop reason: SDUPTHER
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
## 2023-04-17 ENCOUNTER — TELEPHONE (OUTPATIENT)
Dept: PRIMARY CARE CLINIC | Facility: CLINIC | Age: 88
End: 2023-04-17
Payer: MEDICARE

## 2023-04-19 ENCOUNTER — OFFICE VISIT (OUTPATIENT)
Dept: PRIMARY CARE CLINIC | Facility: CLINIC | Age: 88
End: 2023-04-19
Payer: MEDICARE

## 2023-04-19 VITALS
HEART RATE: 110 BPM | BODY MASS INDEX: 30.67 KG/M2 | DIASTOLIC BLOOD PRESSURE: 78 MMHG | WEIGHT: 178.69 LBS | OXYGEN SATURATION: 97 % | SYSTOLIC BLOOD PRESSURE: 124 MMHG

## 2023-04-19 DIAGNOSIS — E53.8 FOLATE DEFICIENCY: Primary | ICD-10-CM

## 2023-04-19 DIAGNOSIS — H40.1131 PRIMARY OPEN ANGLE GLAUCOMA (POAG) OF BOTH EYES, MILD STAGE: ICD-10-CM

## 2023-04-19 DIAGNOSIS — E53.8 B12 DEFICIENCY: ICD-10-CM

## 2023-04-19 PROCEDURE — 1101F PR PT FALLS ASSESS DOC 0-1 FALLS W/OUT INJ PAST YR: ICD-10-PCS | Mod: CPTII,S$GLB,, | Performed by: INTERNAL MEDICINE

## 2023-04-19 PROCEDURE — 1126F AMNT PAIN NOTED NONE PRSNT: CPT | Mod: CPTII,S$GLB,, | Performed by: INTERNAL MEDICINE

## 2023-04-19 PROCEDURE — 99215 OFFICE O/P EST HI 40 MIN: CPT | Mod: S$GLB,,, | Performed by: INTERNAL MEDICINE

## 2023-04-19 PROCEDURE — 99999 PR PBB SHADOW E&M-EST. PATIENT-LVL IV: CPT | Mod: PBBFAC,,, | Performed by: INTERNAL MEDICINE

## 2023-04-19 PROCEDURE — 1126F PR PAIN SEVERITY QUANTIFIED, NO PAIN PRESENT: ICD-10-PCS | Mod: CPTII,S$GLB,, | Performed by: INTERNAL MEDICINE

## 2023-04-19 PROCEDURE — 3288F FALL RISK ASSESSMENT DOCD: CPT | Mod: CPTII,S$GLB,, | Performed by: INTERNAL MEDICINE

## 2023-04-19 PROCEDURE — 99215 PR OFFICE/OUTPT VISIT, EST, LEVL V, 40-54 MIN: ICD-10-PCS | Mod: S$GLB,,, | Performed by: INTERNAL MEDICINE

## 2023-04-19 PROCEDURE — 3288F PR FALLS RISK ASSESSMENT DOCUMENTED: ICD-10-PCS | Mod: CPTII,S$GLB,, | Performed by: INTERNAL MEDICINE

## 2023-04-19 PROCEDURE — 1101F PT FALLS ASSESS-DOCD LE1/YR: CPT | Mod: CPTII,S$GLB,, | Performed by: INTERNAL MEDICINE

## 2023-04-19 PROCEDURE — 99999 PR PBB SHADOW E&M-EST. PATIENT-LVL IV: ICD-10-PCS | Mod: PBBFAC,,, | Performed by: INTERNAL MEDICINE

## 2023-04-19 RX ORDER — BRINZOLAMIDE 10 MG/ML
1 SUSPENSION/ DROPS OPHTHALMIC EVERY MORNING
Qty: 2 ML | Refills: 0 | Status: SHIPPED | OUTPATIENT
Start: 2023-04-19 | End: 2023-06-13 | Stop reason: SDUPTHER

## 2023-04-19 NOTE — PROGRESS NOTES
Shirley Metz  04/19/2023  [id number]    Phylicia Deleon MD  Patient Care Team:  Phylicia Deleon MD as PCP - General (Internal Medicine)  Wilbert Ware MD as Consulting Physician (Ophthalmology)  Rajinder Quintanilla MD as Consulting Physician (Ophthalmology)  Suzan Gregorio PA-C as Physician Assistant (Rheumatology)  Rosario Valadez MD as Consulting Physician (Dermatology)  Trevon Ramirez MD as Consulting Physician (Cardiology)  Amish Mendoza MD (Pulmonary Disease)  Jaquan Choi MD as Consulting Physician (Vascular Surgery)  Debbie Choi MD (Inactive) as Consulting Physician (Gynecology)  Emmanuel Fish MD as Consulting Physician (Hand Surgery)  PAXTON Chaidez Jr., MD as Consulting Physician (Orthopedic Surgery)  Phylicia Deleon MD as Physician (Internal Medicine)  Heather Miller NP as Nurse Practitioner (Family Medicine)    Visit Type: Follow-up    Chief Complaint:  Chief Complaint   Patient presents with    Follow-up     History of Present Illness: Ms. Edmond is a 89 year old female w multiple chronic medical issues including Diastolic CHF, HTN, atherosclerosis, CKD, Afib on Eliquis, obesity, pre-diabetes, osteopenia, and OA.    Frequent recurrent arrythmias w SOB/DUE  Tends to stop and start medications   Multiple perceived medication sensitivities    Was taking furosemide 40 mg BID  Past two weeks furosemide 40 mg in am and a few times added furosemide 20 mg or 40 mg in pm  Plans to take metolazone prior to furosemide     Weighing self - weight has been stable but feels more edematous in trunk  Breathing has been good    PHQ-4 Score: 0     Hosp/ED/Urgent Care:  3/21/23 Cards [facility] PAF cardioversion    Recent appointments:   4/05/23 Cards Malur  4/03/23 O65+ Angela  3/28/23 O65+ Angela  3/17/23 O65+ Angela  3/01/23 Cards Robin  2/24/23 O65+ Angela  2/22/23 Ophthal Banda  2/03/23 Cards Robin  2/03/23 O65+ Angela    Upcoming appointments:  Future Appointments        Date Provider Specialty Appt Notes    4/21/2023  Cardiology Hol[facility]    5/2/2023  Ophthalmology [facility]   SHILOH r/s pt    5/2/2023 Wilbert Ware MD Ophthalmology REV [facility] IOP ck   Due to pt lab time need to put in the 10:00 slot from SHILOH    5/2/2023  Lab kamyar    5/2/2023  Lab kamyar    5/9/2023 Paulie Newman MD Nephrology ep/lab/sf    5/19/2023 Heather Miller, NP Primary Care f/u    6/7/2023 Toi Kelly MD Cardiology 6 week f/u    7/26/2023 Trevon Ramirez MD Cardiology 3 mo f/u     The following were reviewed: Active problem list, medication list, allergies, family history, social history, and Health Maintenance.     Medications have been reviewed and reconciled with patient at visit today.    Review of Systems   See HPI above    Exam: sat 97%  Vitals:    04/19/23 1248   BP: 124/78   Pulse: 110     BP w Home Monitor 145/93    Weight: 81.1 kg (178 lb 11.2 oz)   Body mass index is 30.67 kg/m².    BP Readings from Last 3 Encounters:   04/19/23 124/78   04/05/23 128/62   04/03/23 136/61      Wt Readings from Last 3 Encounters:   04/19/23 1248 81.1 kg (178 lb 11.2 oz)   04/05/23 0907 81.3 kg (179 lb 3.7 oz)   04/03/23 1445 80.3 kg (177 lb 1.6 oz)     Physical Exam  Constitutional:       General: She is not in acute distress.     Appearance: Normal appearance. She is obese.   HENT:      Head: Normocephalic and atraumatic.      Right Ear: Tympanic membrane, ear canal and external ear normal.      Left Ear: Tympanic membrane, ear canal and external ear normal.      Nose: Nose normal.      Mouth/Throat:      Mouth: Mucous membranes are moist.      Pharynx: Oropharynx is clear.   Eyes:      General: No scleral icterus.     Extraocular Movements: Extraocular movements intact.      Conjunctiva/sclera: Conjunctivae normal.      Comments: glasses   Neck:      Vascular: No carotid bruit.      Comments: Elevated JVP on R  Cardiovascular:      Rate and Rhythm: Tachycardia present. Rhythm irregular.   Pulmonary:       Effort: Pulmonary effort is normal.      Breath sounds: Normal breath sounds. No wheezing, rhonchi or rales.   Abdominal:      General: Bowel sounds are normal.      Palpations: Abdomen is soft.      Tenderness: There is no abdominal tenderness. There is no guarding.   Musculoskeletal:      Right lower leg: Edema present.      Left lower leg: Edema present.      Comments: Moves independently from chair onto and off of exam table   Lymphadenopathy:      Cervical: No cervical adenopathy.   Skin:     General: Skin is warm and dry.   Neurological:      General: No focal deficit present.      Mental Status: She is alert and oriented to person, place, and time. Mental status is at baseline.      Gait: Gait abnormal.      Comments: Ambulates w rollator for support   Psychiatric:         Behavior: Behavior normal.         Thought Content: Thought content normal.      Comments: Many questions regarding medications and best next steps      Laboratory Reviewed  Lab Results   Component Value Date    WBC 6.63 01/12/2023    HGB 11.5 (L) 01/12/2023    HCT 35.6 (L) 01/12/2023     01/12/2023     (H) 01/12/2023    CHOL 168 08/31/2022    TRIG 98 08/31/2022    HDL 43 08/31/2022    LDLCALC 105.4 08/31/2022    ALT 16 01/12/2023    AST 21 01/12/2023     04/03/2023    K 3.6 04/03/2023     04/03/2023    CREATININE 1.9 (H) 04/03/2023    BUN 36 (H) 04/03/2023    CO2 29 04/03/2023    MG 2.2 04/03/2023    TSH 3.826 08/31/2022    INR 1.1 10/25/2022    HGBA1C 7.0 (H) 03/20/2023    CRP 4.0 04/26/2022 4/05/23  eGFR 24.8    9/02/22 mma 0.36 (mildly elevated @ 0.44 previously) B12 557 (low normal previously)    [facility] 3/21/23: The left ventricle is normal in size with normal systolic function.  Atrial fibrillation observed.  Normal right ventricular size with normal right ventricular systolic function.  There is a patent foramen ovale present.  Normal appearing left atrial appendage. No thrombus is present in the  appendage. Abnormal appendage velocities.  Right atrial enlargement.  Moderate mitral regurgitation.  Plaque present in the descending aorta.  The estimated ejection fraction is 55%.  A 200 J synchronized cardioversion was successfully performed with restoration of normal sinus rhythm.    Cardiac monitoring 4/05/23: The patient was asymptomatic with 100% atrial fibrillation. 2 symptomatic episodes associated w PVCs    Assessment:   89 y.o. female with multiple co-morbid illnesses here for continued follow up of medical problems.      The primary encounter diagnosis was Folate deficiency. Diagnoses of Primary open angle glaucoma (POAG) of both eyes, mild stage and B12 deficiency were also pertinent to this visit.      Plan:   1. Folate deficiency  -     Folate; Future; Expected date: 05/02/2023    2. Primary open angle glaucoma (POAG) of both eyes, mild stage  -     brinzolamide (AZOPT) 1 % ophthalmic suspension; Place 1 drop into the left eye every morning.  Dispense: 2 mL; Refill: 0    3. B12 deficiency  -     Vitamin B12; Future; Expected date: 05/02/2023    Other orders  -     apixaban (ELIQUIS) 2.5 mg Tab; Take 1 tablet (2.5 mg total) by mouth 2 (two) times daily.  Dispense: 180 tablet; Refill: 3         Health Maintenance         Date Due Completion Date    Shingles Vaccine (1 of 2) Never done ---    TETANUS VACCINE 11/09/2021 11/9/2011    Pneumococcal Vaccines (Age 65+) (2 - PCV) 03/16/2023 3/16/2022    Lipid Panel 08/31/2023 8/31/2022    Hemoglobin A1c (Prediabetes) 03/20/2024 3/20/2023            -Patient's lab results were reviewed and discussed with patient  -Treatment options and alternatives were discussed with the patient. Patient expressed understanding. Patient was given the opportunity to ask questions and be an active participant in their medical care. Patient had no further questions or concerns at this time.   -Patient is an overall moderate to HIGH risk for health complications from their medical  conditions.     Follow up: Follow up in about 1 month (around 5/19/2023) for Follow Up.    After visit summary printed and given to patient upon discharge.  Patient care plan included in After visit summary.    TOTAL TIME evaluating and managing this patient for this encounter was greater than 90 minutes. This time was spent personally by me on some of the following activities: review of patient's past medical history, assessing age-appropriate health maintenance needs, review of any interval history, review and interpretation of lab results, review and interpretation of imaging test results, review and interpretation of cardiology test results, reviewing consulting specialist notes, obtaining history from the patient and family, examination of the patient, medication reconciliation, managing and/or ordering prescription medications, ordering imaging tests, ordering referral to subspecialty provider(s), educating patient and answering their questions about diagnosis, treatment plan, and goals of treatment, discussing planned follow-up and final documentation of the visit. This time was exclusive of any separately billable procedures for this patient and exclusive of time spent treating any other patients.     Addendum  Spoke w Ms. Bettencourt by phone regarding Dr. Ramirez's recommendation to cont current Rx and f/u w Dr. Kelly for afib w elevated HR

## 2023-04-19 NOTE — PATIENT INSTRUCTIONS
Please check BP prior to adding metolazone - ok to take if BP > 120/80    Cont metoprolol 12.5 mg twice daily until advised otherwise    Ok to continue holding losartan    Recommend contact Dr. Kelly regarding a-fib w elevated heart rate - next appt 6/07/23 - check if should be seen sooner

## 2023-04-21 ENCOUNTER — TELEPHONE (OUTPATIENT)
Dept: CARDIOLOGY | Facility: HOSPITAL | Age: 88
End: 2023-04-21
Payer: MEDICARE

## 2023-04-23 ENCOUNTER — PATIENT MESSAGE (OUTPATIENT)
Dept: CARDIOLOGY | Facility: CLINIC | Age: 88
End: 2023-04-23
Payer: MEDICARE

## 2023-04-25 ENCOUNTER — PATIENT MESSAGE (OUTPATIENT)
Dept: CARDIOLOGY | Facility: CLINIC | Age: 88
End: 2023-04-25
Payer: MEDICARE

## 2023-04-26 ENCOUNTER — TELEPHONE (OUTPATIENT)
Dept: PRIMARY CARE CLINIC | Facility: CLINIC | Age: 88
End: 2023-04-26

## 2023-04-26 ENCOUNTER — PATIENT MESSAGE (OUTPATIENT)
Dept: CARDIOLOGY | Facility: CLINIC | Age: 88
End: 2023-04-26
Payer: MEDICARE

## 2023-04-26 ENCOUNTER — OFFICE VISIT (OUTPATIENT)
Dept: PRIMARY CARE CLINIC | Facility: CLINIC | Age: 88
End: 2023-04-26
Payer: MEDICARE

## 2023-04-26 VITALS
HEIGHT: 64 IN | DIASTOLIC BLOOD PRESSURE: 72 MMHG | OXYGEN SATURATION: 97 % | WEIGHT: 176.5 LBS | HEART RATE: 123 BPM | RESPIRATION RATE: 16 BRPM | BODY MASS INDEX: 30.13 KG/M2 | SYSTOLIC BLOOD PRESSURE: 120 MMHG

## 2023-04-26 DIAGNOSIS — D64.9 ANEMIA, UNSPECIFIED TYPE: ICD-10-CM

## 2023-04-26 DIAGNOSIS — I50.33 ACUTE ON CHRONIC DIASTOLIC CONGESTIVE HEART FAILURE: ICD-10-CM

## 2023-04-26 DIAGNOSIS — N18.4 CKD STAGE 4 SECONDARY TO HYPERTENSION: ICD-10-CM

## 2023-04-26 DIAGNOSIS — I48.0 PAF (PAROXYSMAL ATRIAL FIBRILLATION): Chronic | ICD-10-CM

## 2023-04-26 DIAGNOSIS — R00.0 TACHYCARDIA: Primary | ICD-10-CM

## 2023-04-26 DIAGNOSIS — I50.32 CHRONIC DIASTOLIC CONGESTIVE HEART FAILURE: ICD-10-CM

## 2023-04-26 DIAGNOSIS — R00.0 SINUS TACHYCARDIA: ICD-10-CM

## 2023-04-26 DIAGNOSIS — E21.3 HYPERPARATHYROIDISM: ICD-10-CM

## 2023-04-26 DIAGNOSIS — I12.9 CKD STAGE 4 SECONDARY TO HYPERTENSION: ICD-10-CM

## 2023-04-26 LAB — GLUCOSE SERPL-MCNC: 122 MG/DL (ref 70–110)

## 2023-04-26 PROCEDURE — 1160F RVW MEDS BY RX/DR IN RCRD: CPT | Mod: CPTII,S$GLB,, | Performed by: NURSE PRACTITIONER

## 2023-04-26 PROCEDURE — 82962 GLUCOSE BLOOD TEST: CPT | Mod: S$GLB,,, | Performed by: NURSE PRACTITIONER

## 2023-04-26 PROCEDURE — 93010 EKG 12-LEAD: ICD-10-PCS | Mod: S$GLB,,, | Performed by: INTERNAL MEDICINE

## 2023-04-26 PROCEDURE — 93005 ELECTROCARDIOGRAM TRACING: CPT | Mod: S$GLB,,, | Performed by: NURSE PRACTITIONER

## 2023-04-26 PROCEDURE — 1160F PR REVIEW ALL MEDS BY PRESCRIBER/CLIN PHARMACIST DOCUMENTED: ICD-10-PCS | Mod: CPTII,S$GLB,, | Performed by: NURSE PRACTITIONER

## 2023-04-26 PROCEDURE — 93005 EKG 12-LEAD: ICD-10-PCS | Mod: S$GLB,,, | Performed by: NURSE PRACTITIONER

## 2023-04-26 PROCEDURE — 99215 PR OFFICE/OUTPT VISIT, EST, LEVL V, 40-54 MIN: ICD-10-PCS | Mod: S$GLB,,, | Performed by: NURSE PRACTITIONER

## 2023-04-26 PROCEDURE — 1159F PR MEDICATION LIST DOCUMENTED IN MEDICAL RECORD: ICD-10-PCS | Mod: CPTII,S$GLB,, | Performed by: NURSE PRACTITIONER

## 2023-04-26 PROCEDURE — 99999 PR PBB SHADOW E&M-EST. PATIENT-LVL III: ICD-10-PCS | Mod: PBBFAC,,, | Performed by: NURSE PRACTITIONER

## 2023-04-26 PROCEDURE — 99215 OFFICE O/P EST HI 40 MIN: CPT | Mod: S$GLB,,, | Performed by: NURSE PRACTITIONER

## 2023-04-26 PROCEDURE — 82962 POCT GLUCOSE, HAND-HELD DEVICE: ICD-10-PCS | Mod: S$GLB,,, | Performed by: NURSE PRACTITIONER

## 2023-04-26 PROCEDURE — 99999 PR PBB SHADOW E&M-EST. PATIENT-LVL III: CPT | Mod: PBBFAC,,, | Performed by: NURSE PRACTITIONER

## 2023-04-26 PROCEDURE — 1159F MED LIST DOCD IN RCRD: CPT | Mod: CPTII,S$GLB,, | Performed by: NURSE PRACTITIONER

## 2023-04-26 PROCEDURE — 93010 ELECTROCARDIOGRAM REPORT: CPT | Mod: S$GLB,,, | Performed by: INTERNAL MEDICINE

## 2023-04-26 RX ORDER — METOPROLOL TARTRATE 25 MG/1
25 TABLET, FILM COATED ORAL 2 TIMES DAILY
Qty: 180 TABLET | Refills: 3 | Status: SHIPPED | OUTPATIENT
Start: 2023-04-26 | End: 2023-04-26

## 2023-04-26 RX ORDER — METOPROLOL TARTRATE 25 MG/1
25 TABLET, FILM COATED ORAL 2 TIMES DAILY
Qty: 180 TABLET | Refills: 3 | Status: SHIPPED | OUTPATIENT
Start: 2023-04-26 | End: 2023-05-16

## 2023-04-26 RX ORDER — FUROSEMIDE 40 MG/1
40 TABLET ORAL 2 TIMES DAILY
Qty: 180 TABLET | Refills: 1 | Status: SHIPPED | OUTPATIENT
Start: 2023-04-26 | End: 2023-04-26

## 2023-04-26 RX ORDER — FUROSEMIDE 40 MG/1
40 TABLET ORAL 2 TIMES DAILY
Qty: 180 TABLET | Refills: 1 | Status: SHIPPED | OUTPATIENT
Start: 2023-04-26 | End: 2023-05-09

## 2023-04-26 NOTE — ASSESSMENT & PLAN NOTE
Asymptomatic presently.   CBC, RFP, BNP, TSH, free T4, D-Dimer.   Increase metoprolol to 25 mg BID.   Will message cardiology.

## 2023-04-26 NOTE — ASSESSMENT & PLAN NOTE
Lab Results   Component Value Date    .1 (H) 03/20/2023    CALCIUM 9.8 04/03/2023    PHOS 3.4 04/03/2023   Monitor

## 2023-04-26 NOTE — Clinical Note
Good afternoon - Ms. Rg has sinus tachycardia for the past week with rate of 120. She has occasional palpitations but overall reports feeling great. No other sx. I'm checking labs (CBC, RFP, BNP, TSH, T4, Ddimer) and advised to increase metoprolol to 25 mg BID. Please let me know if you have other recommendations.  Home

## 2023-04-26 NOTE — PROGRESS NOTES
Shirley Metz  04/26/2023  [id number]    Phylicia Deleon MD  Patient Care Team:  Phylicia Deleon MD as PCP - General (Internal Medicine)  Wilbert Ware MD as Consulting Physician (Ophthalmology)  Rajinder Quintanilla MD as Consulting Physician (Ophthalmology)  Suzan Gregorio PA-C as Physician Assistant (Rheumatology)  Rosario Valadez MD as Consulting Physician (Dermatology)  Trevon Ramirez MD as Consulting Physician (Cardiology)  Amish Mendoza MD (Pulmonary Disease)  Jaquan Choi MD as Consulting Physician (Vascular Surgery)  Debbie Choi MD (Inactive) as Consulting Physician (Gynecology)  Emmanuel Fish MD as Consulting Physician (Hand Surgery)  PAXTON Chaidez Jr., MD as Consulting Physician (Orthopedic Surgery)  Phylicia Deleon MD as Physician (Internal Medicine)  Heather Miller NP as Nurse Practitioner (Family Medicine)      [facility] Note      Chief Complaint:  No chief complaint on file.      History of Present Illness:  HPI    F/U Afib, HF. Feels good today, feeling good past 2 weeks, better than she has in years. Attributes to stopping losartan.     4/20-4/26/23 BP 93//86, typically SBP 120s. HR , typically low 100s.     Fast HR. Fast HR. Palpitations mainly in AM. Has only needed midodrine once this week.   No dyspnea.     Unable to get heart monitor applied due to inclimate weather/no transportation. Having swelling to trunk. Taking metolazone PRN. Taking Lasix 40 mg q AM and 20 mg q PM with additional 20 mg if needed.     Gained 4 lb overnight last week, took metolazone and wt returned to normal.     Appetite has improved. Still problems with lips crusting in evenings, dryness to eyes.           Review of Systems   Constitutional:  Positive for malaise/fatigue (better than previous). Negative for chills, diaphoresis and fever.   Respiratory:  Negative for cough and shortness of breath.    Cardiovascular:  Positive for palpitations.  Negative for chest pain, claudication and leg swelling.   Gastrointestinal:  Negative for constipation, diarrhea, heartburn, nausea and vomiting.   Genitourinary:  Negative for dysuria.   Musculoskeletal:  Negative for joint pain and myalgias.   Neurological:  Negative for dizziness and headaches.   Psychiatric/Behavioral:  Negative for depression.        The following were reviewed: Active problem list, medication list, allergies, family history, social history, and Health Maintenance.     History:  Past Medical History:   Diagnosis Date    Anemia 11/29/2018    Anxiety 11/28/2017    Arthritis     Atrial fibrillation with RVR 10/9/2019    Back pain     Basal cell carcinoma 03/29/2018    left mid back    Chronic diastolic congestive heart failure 10/15/2019    CKD (chronic kidney disease) stage 3, GFR 30-59 ml/min 8/8/2016    Colon polyps     reported per patient.     Coronary artery calcification 10/5/2021    Fuchs' corneal dystrophy     General anesthetics causing adverse effect in therapeutic use     woke up during surgery and gagged on ET tube    Glaucoma     Glaucoma     Heart failure with preserved left ventricular function (HFpEF) 7/24/2018    Hyperlipidemia     Hypertension     Macular degeneration dry    Obesity     PVD (peripheral vascular disease) 4/26/2021    PVD (peripheral vascular disease) 4/26/2021    Squamous cell carcinoma 01/08/2019    left shoulder    Stenosis of carotid artery 10/5/2021    Trouble in sleeping     Type 2 diabetes mellitus without complication, without long-term current use of insulin 2/24/2021    Urinary tract infection      Past Surgical History:   Procedure Laterality Date    ABLATION OF ARRHYTHMOGENIC FOCUS FOR ATRIAL FIBRILLATION N/A 11/3/2022    Procedure: Ablation atrial fibrillation;  Surgeon: Toi Kelly MD;  Location: [facility];  Service: Cardiology;  Laterality: N/A;  afib, PVI/CTI, RFA, STACEY (cx if SR), DEDE, rolanda, MB, 3 Prep    ADENOIDECTOMY  1938    BREAST  BIOPSY Left     1997. benign    BREAST CYST EXCISION Left 1997 approx    CARPAL TUNNEL RELEASE Right 2012 approx    CATARACT EXTRACTION Bilateral 1994    CHOLECYSTECTOMY  1975    CORNEAL TRANSPLANT Bilateral 08/2015 8/2015 - left; Right 4/2016    EYE SURGERY      Fuch's Corneal dystrophy - had 2nd operation with Dr. Quintanilla    EYE SURGERY      Cataract wIOL    left thumb Left 2011    removed cuboid for arthritis    REVERSE TOTAL SHOULDER ARTHROPLASTY Left 8/21/2018    Procedure: ARTHROPLASTY, SHOULDER, TOTAL, REVERSE;  Surgeon: Solomon Lujan MD;  Location: [facility] OR;  Service: Orthopedics;  Laterality: Left;  WITH BICEP TENODESIS    SKIN CANCER EXCISION Left 2017    BCC removal by Dr. Valadez    SLT- OS (aka TRABECULOPLASTY) Left 05/11/2011    repeat 3/14/12    TENOTOMY Left 8/21/2018    Procedure: TENOTOMY;  Surgeon: Solomon Lujan MD;  Location: [facility] OR;  Service: Orthopedics;  Laterality: Left;    TONSILLECTOMY  1938    TRANSESOPHAGEAL ECHOCARDIOGRAM WITH POSSIBLE CARDIOVERSION (STACEY W/ POSS CARDIOVERSION) N/A 3/21/2023    Procedure: Transesophageal echo (STACEY) intra-procedure log documentation;  Surgeon: Trevon Ramirez MD;  Location: [facility] CATH LAB;  Service: Cardiology;  Laterality: N/A;    TREATMENT OF CARDIAC ARRHYTHMIA N/A 10/10/2019    Procedure: CARDIOVERSION;  Surgeon: Pete Durán MD;  Location: [facility] CATH LAB;  Service: Cardiology;  Laterality: N/A;    TREATMENT OF CARDIAC ARRHYTHMIA N/A 12/6/2019    Procedure: CARDIOVERSION;  Surgeon: Samy Castillo MD;  Location: [facility] CATH LAB;  Service: Cardiology;  Laterality: N/A;     Family History   Problem Relation Age of Onset    Heart disease Mother     Hypertension Mother     Cancer Father 88        sarcoma( ?Kaposi's ) on leg    Deep vein thrombosis Neg Hx     Ovarian cancer Neg Hx     Breast cancer Neg Hx     Kidney disease Neg Hx     Strabismus Neg Hx     Retinal detachment Neg Hx     Macular degeneration Neg Hx     Glaucoma Neg Hx     Blindness Neg Hx      Amblyopia Neg Hx     Eczema Neg Hx     Lupus Neg Hx     Melanoma Neg Hx     Psoriasis Neg Hx     Diabetes Neg Hx     Stroke Neg Hx     Mental retardation Neg Hx     Mental illness Neg Hx     Hyperlipidemia Neg Hx     COPD Neg Hx     Asthma Neg Hx     Depression Neg Hx     Alcohol abuse Neg Hx     Drug abuse Neg Hx      Social History     Socioeconomic History    Marital status:     Number of children: 3   Tobacco Use    Smoking status: Never    Smokeless tobacco: Never    Tobacco comments:     history of passive smoking from her ex-   Substance and Sexual Activity    Alcohol use: Yes     Alcohol/week: 2.0 standard drinks     Types: 2 Standard drinks or equivalent per week     Comment: occ    Drug use: No    Sexual activity: Not Currently     Partners: Male     Birth control/protection: Post-menopausal     Comment: discussed protection for STD's   Other Topics Concern    Are you pregnant or think you may be? No    Breast-feeding No   Social History Narrative     x 2,  x 1. Retired artist - previously doing jewelry/wall pieces; ; lives in [facility]; has 3 sons - 52( lives in [place], 54, and 56;exercises minimally - limited due to back issues. Still drives. Does have a Living Will.     Social Determinants of Health     Physical Activity: Unknown    Days of Exercise per Week: Patient refused    Minutes of Exercise per Session: 10 min   Stress: No Stress Concern Present    Feeling of Stress : Not at all     Patient Active Problem List   Diagnosis    HTN (hypertension)    Mixed hyperlipidemia    Primary osteoarthritis involving multiple joints    Macular degeneration - Both Eyes    Fuch's endothelial dystrophy - Both Eyes    Lens replaced by other means    COAG (chronic open-angle glaucoma) - Both Eyes    Blepharitis, unspecified - Both Eyes    RUIZ (dyspnea on exertion)    Abnormal ECG    PVC's (premature ventricular contractions)    Other microscopic hematuria     LVH (left ventricular hypertrophy) due to hypertensive disease    Osteopenia of hip    Myalgia    Calcification of aorta    Neuropathy    Unequal blood pressures in paired extremities    Medication side effects    History of cornea transplant    Pseudophakia    Insomnia    Anxiety    Pre-diabetes    Obesity (BMI 30.0-34.9)    Elevated troponin    Bilateral shoulder region arthritis    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    Anemia    New onset a-fib    Thrombus of left atrial appendage    Persistent atrial fibrillation    Elevated liver enzymes    Atrial flutter with rapid ventricular response    Chronic anticoagulation    Keratitis sicca, bilateral    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    PAD (peripheral artery disease)    Statin intolerance    Pulmonary nodules/lesions, multiple    Spinal stenosis at L4-L5 level    Pulmonary granuloma    Lumbar spondylosis    Bilateral recurrent inguinal hernia without obstruction or gangrene    LLQ abdominal pain    Pain in left leg    Rectal abnormality    Stenosis of carotid artery    Carotid artery calcification, bilateral    B12 deficiency    Seasonal allergic rhinitis    Atrial fibrillation with RVR    Elevated serum creatinine    Coagulopathy    Atheroma of artery    Chronic diastolic congestive heart failure    Troponin level elevated    Hyperparathyroidism    Sinus tachycardia     Review of patient's allergies indicates:   Allergen Reactions    Carvedilol Swelling     Lip swelling    Cephalexin Other (See Comments)     Muscle/joint weakness unable to move     Doxycycline Shortness Of Breath, Nausea And Vomiting and Other (See Comments)     weakness    Erythromycin Other (See Comments)     Complete weakness/could not walk    Gadolinium-containing contrast media Swelling     angioedema    Hydrocodone-acetaminophen Shortness Of Breath     wheezing    Inveltys [loteprednol etabonate] Palpitations and Other (See  Comments)     Patient stated bp went up.    Labetalol Shortness Of Breath, Nausea Only, Palpitations and Other (See Comments)     weakness    Loratadine Other (See Comments)     Double vision/spots  Other reaction(s): double vision    Naproxen      wheezing  wheezing  wheezing  Other reaction(s): wheezing    Statins-hmg-coa reductase inhibitors Other (See Comments)     Severe Muscle weakness  Muscle weakness  Severe Muscle weakness  Other reaction(s): severe muscle weakness    Amlodipine Other (See Comments)     Myalgias    Fenofibrate Other (See Comments)     muscle weakness      Hydralazine Other (See Comments)     muscle tremors    Hydralazine analogues Other (See Comments)     Muscle tremors/aches      Loteprednol Other (See Comments)     increased BP    Macrolide antibiotics Other (See Comments)     Complete weakness/could not walk      Moxifloxacin Hives and Other (See Comments)     muscle soreness    Timolol Other (See Comments)     Blurred vision, Burning eyes, Severe muscle weakness, eye problems.      Verapamil Diarrhea     Severe diarrhea.      Hydrocortisone     Isosorbide      Tolerates Imdur    Tetanus and diphtheria toxoids     Adhesive Rash     Clonidine patch--contact dermatitis    Codeine Diarrhea and Other (See Comments)     Loss of balance       Doxazosin Palpitations    Fexofenadine Other (See Comments)     Double vision/spots       Hydrocortisone (bulk) Other (See Comments)     Only suppository/ caused muscle weakness    Latanoprost Other (See Comments)     Muscle weakness      Olopatadine Other (See Comments)     Muscle weakness      Prednisone Anxiety       Medications:  Current Outpatient Medications on File Prior to Visit   Medication Sig Dispense Refill    acetaminophen (TYLENOL) 500 MG tablet Take 500 mg by mouth nightly as needed.      ALPRAZolam (XANAX) 0.5 MG tablet Take 1 tablet (0.5 mg total) by mouth nightly as needed for Anxiety. 30 tablet 5    apixaban (ELIQUIS) 2.5 mg Tab Take 1  tablet (2.5 mg total) by mouth 2 (two) times daily. 180 tablet 3    b complex vitamins capsule Take 1 capsule by mouth once daily.      brinzolamide (AZOPT) 1 % ophthalmic suspension Place 1 drop into the left eye every morning. 2 mL 0    cholecalciferol, vitamin D3, (VITAMIN D3) 50 mcg (2,000 unit) Cap Take 1 capsule by mouth once daily.      coenzyme Q10 200 mg capsule Take 200 mg by mouth once daily.      fish oil-omega-3 fatty acids 300-1,000 mg capsule Take 1 capsule by mouth once daily.      midodrine (PROAMATINE) 5 MG Tab Take 1 tablet (5 mg total) by mouth 3 (three) times daily as needed (for BP < 100/70). 180 tablet 1    POLYETHYLENE GLYCOL 3350 (MIRALAX ORAL) Take 17 g by mouth 2 (two) times a day. Taking BID      RHOPRESSA 0.02 % ophthalmic solution PLACE 1 DROP INTO THE LEFT EYE EVERY EVENING. 2.5 mL 11    sars-cov-2, covid-19 pfizer omicron, (PFIZER COVID BIVAL,12Y UP,,PF,) 30 mcg/0.3 mL injection Inject into the muscle. 0.3 mL 0    TRAVATAN Z 0.004 % ophthalmic solution Place 1 drop into the left eye every evening. 2.5 mL 12    [DISCONTINUED] cloNIDine (CATAPRES) 0.1 MG tablet Take 1 tablet (0.1 mg total) by mouth 2 (two) times daily as needed (systolic BP over 180). 20 tablet 1    [DISCONTINUED] furosemide (LASIX) 40 MG tablet Take 1 tablet (40 mg total) by mouth once daily. 90 tablet 1    [DISCONTINUED] isosorbide mononitrate (IMDUR) 30 MG 24 hr tablet Take 1 tablet (30 mg total) by mouth every evening. 90 tablet 1    [DISCONTINUED] metoprolol tartrate (LOPRESSOR) 25 MG tablet Take 0.5 tablets (12.5 mg total) by mouth 2 (two) times daily. 90 tablet 3     Current Facility-Administered Medications on File Prior to Visit   Medication Dose Route Frequency Provider Last Rate Last Admin    sodium chloride 0.9% flush 3 mL  3 mL Intravenous PRN Steve Galarza PA-C        vancomycin in dextrose 5 % 1 gram/250 mL IVPB 1,000 mg  1,000 mg Intravenous On Call Procedure Vianney Fountain, NP                Medications have been reviewed and reconciled with patient at visit today.    Barriers to medications present (no )    Fall since last office visit (no )      Exam:  Vitals:    04/26/23 1305   BP: 120/72   Pulse: (!) 123   Resp: 16     Weight: 80.1 kg (176 lb 8 oz)   Body mass index is 30.3 kg/m².      BP Readings from Last 3 Encounters:   04/26/23 120/72   04/19/23 124/78   04/05/23 128/62     Wt Readings from Last 3 Encounters:   04/26/23 1305 80.1 kg (176 lb 8 oz)   04/19/23 1248 81.1 kg (178 lb 11.2 oz)   04/05/23 0907 81.3 kg (179 lb 3.7 oz)            Physical Exam  Constitutional:       General: She is not in acute distress.     Appearance: She is not ill-appearing.   HENT:      Nose: Nose normal.      Mouth/Throat:      Mouth: Mucous membranes are moist.      Pharynx: Oropharynx is clear. No oropharyngeal exudate or posterior oropharyngeal erythema.   Eyes:      General: No scleral icterus.  Cardiovascular:      Rate and Rhythm: Tachycardia present. Rhythm irregular.      Pulses: Normal pulses.   Pulmonary:      Effort: Pulmonary effort is normal.      Breath sounds: Normal breath sounds.   Abdominal:      General: There is no distension.      Palpations: Abdomen is soft.      Tenderness: There is no abdominal tenderness.   Musculoskeletal:         General: No swelling.   Skin:     General: Skin is warm and dry.   Neurological:      Mental Status: She is alert. Mental status is at baseline.   Psychiatric:         Mood and Affect: Mood normal.         Behavior: Behavior normal.       Laboratory Reviewed: (Yes)  Lab Results   Component Value Date    WBC 6.63 01/12/2023    HGB 11.5 (L) 01/12/2023    HCT 35.6 (L) 01/12/2023     01/12/2023    CHOL 168 08/31/2022    TRIG 98 08/31/2022    HDL 43 08/31/2022    ALT 16 01/12/2023    AST 21 01/12/2023     04/03/2023    K 3.6 04/03/2023     04/03/2023    CREATININE 1.9 (H) 04/03/2023    BUN 36 (H) 04/03/2023    CO2 29 04/03/2023    TSH 3.826  08/31/2022    INR 1.1 10/25/2022    HGBA1C 7.0 (H) 03/20/2023     EKG: sinus tachycardia with rate 117 bpm, ST and Twave abnorm.       Health Maintenance  Health Maintenance Topics with due status: Not Due       Topic Last Completion Date    Lipid Panel 08/31/2022    Hemoglobin A1c (Prediabetes) 03/20/2023     Health Maintenance Due   Topic Date Due    Shingles Vaccine (1 of 2) Never done    TETANUS VACCINE  11/09/2021    Pneumococcal Vaccines (Age 65+) (2 - PCV) 03/16/2023       Assessment:  Problem List Items Addressed This Visit          Cardiac/Vascular    Sinus tachycardia     Asymptomatic presently.   CBC, RFP, BNP, TSH, free T4, D-Dimer.   Increase metoprolol to 25 mg BID.   Will message cardiology.            Chronic diastolic congestive heart failure     Tachycardia noted. BP normal, weight stable.   BNP today.             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 RFP.  F/U with nephrology.              Oncology    Anemia     Lab Results   Component Value Date    HGB 11.5 (L) 01/12/2023   Repeat CBC            Endocrine    Hyperparathyroidism     Lab Results   Component Value Date    .1 (H) 03/20/2023    CALCIUM 9.8 04/03/2023    PHOS 3.4 04/03/2023   Monitor            Other Visit Diagnoses       Tachycardia    -  Primary    Relevant Orders    IN OFFICE EKG 12-LEAD (to Muse)    T4, FREE    TSH    D-DIMER, QUANTITATIVE    POCT Glucose, Hand-Held Device (Completed)    B-TYPE NATRIURETIC PEPTIDE    PAF (paroxysmal atrial fibrillation)  (Chronic)       Relevant Medications    metoprolol tartrate (LOPRESSOR) 25 MG tablet              Plan:  Tachycardia  -     IN OFFICE EKG 12-LEAD (to Muse)  -     T4, FREE; Future; Expected date: 04/26/2023  -     TSH; Future; Expected date: 04/26/2023  -     D-DIMER, QUANTITATIVE; Future; Expected date: 04/26/2023  -     POCT Glucose, Hand-Held Device  -     B-TYPE NATRIURETIC  PEPTIDE; Future; Expected date: 04/26/2023    PAF (paroxysmal atrial fibrillation)  -     Discontinue: metoprolol tartrate (LOPRESSOR) 25 MG tablet; Take 1 tablet (25 mg total) by mouth 2 (two) times daily.  Dispense: 180 tablet; Refill: 3  -     metoprolol tartrate (LOPRESSOR) 25 MG tablet; Take 1 tablet (25 mg total) by mouth 2 (two) times daily.  Dispense: 180 tablet; Refill: 3    Acute on chronic diastolic congestive heart failure  -     Discontinue: furosemide (LASIX) 40 MG tablet; Take 1 tablet (40 mg total) by mouth 2 (two) times a day.  Dispense: 180 tablet; Refill: 1  -     furosemide (LASIX) 40 MG tablet; Take 1 tablet (40 mg total) by mouth 2 (two) times a day.  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 Patient expressed understanding. Patient was given the opportunity to ask questions and be an active participant in their medical care. Patient had no further questions or concerns at this time.   -Documentation of patient's health and condition was obtained from family member who was present during visit.  -Patient is an overall moderate risk for health complications from their medical conditions.       Follow up: Follow up in about 1 week (around 5/3/2023).      After visit summary printed and given to patient upon discharge.  Patient goals and care plan are included in After visit summary.    Total medical decision making time was 60   min.  The following issues were discussed: The primary encounter diagnosis was Tachycardia. Diagnoses of PAF (paroxysmal atrial fibrillation), Acute on chronic diastolic congestive heart failure, Sinus tachycardia, Chronic diastolic congestive heart failure, CKD stage 4 secondary to  hypertension, Anemia, unspecified type, and Hyperparathyroidism were also pertinent to this visit.    Health maintenance needs, recent test results and goals of care discussed with pt and questions answered.

## 2023-04-27 ENCOUNTER — LAB VISIT (OUTPATIENT)
Dept: LAB | Facility: HOSPITAL | Age: 88
End: 2023-04-27
Attending: NURSE PRACTITIONER
Payer: MEDICARE

## 2023-04-27 DIAGNOSIS — E53.8 FOLATE DEFICIENCY: ICD-10-CM

## 2023-04-27 DIAGNOSIS — N19 HYPERTENSIVE HEART AND RENAL DISEASE WITH BOTH (CONGESTIVE) HEART FAILURE AND RENAL FAILURE: ICD-10-CM

## 2023-04-27 DIAGNOSIS — I13.2 HYPERTENSIVE HEART AND RENAL DISEASE WITH BOTH (CONGESTIVE) HEART FAILURE AND RENAL FAILURE: ICD-10-CM

## 2023-04-27 DIAGNOSIS — R00.0 TACHYCARDIA: ICD-10-CM

## 2023-04-27 DIAGNOSIS — E53.8 B12 DEFICIENCY: ICD-10-CM

## 2023-04-27 LAB
ALBUMIN SERPL BCP-MCNC: 3.9 G/DL (ref 3.5–5.2)
ANION GAP SERPL CALC-SCNC: 19 MMOL/L (ref 8–16)
BASOPHILS # BLD AUTO: 0.08 K/UL (ref 0–0.2)
BASOPHILS NFR BLD: 1.2 % (ref 0–1.9)
BNP SERPL-MCNC: 157 PG/ML (ref 0–99)
BUN SERPL-MCNC: 38 MG/DL (ref 8–23)
CALCIUM SERPL-MCNC: 10.1 MG/DL (ref 8.7–10.5)
CHLORIDE SERPL-SCNC: 99 MMOL/L (ref 95–110)
CO2 SERPL-SCNC: 25 MMOL/L (ref 23–29)
CREAT SERPL-MCNC: 2.1 MG/DL (ref 0.5–1.4)
DIFFERENTIAL METHOD: ABNORMAL
EOSINOPHIL # BLD AUTO: 0.4 K/UL (ref 0–0.5)
EOSINOPHIL NFR BLD: 5.9 % (ref 0–8)
ERYTHROCYTE [DISTWIDTH] IN BLOOD BY AUTOMATED COUNT: 14.5 % (ref 11.5–14.5)
EST. GFR  (NO RACE VARIABLE): 22 ML/MIN/1.73 M^2
GLUCOSE SERPL-MCNC: 106 MG/DL (ref 70–110)
HCT VFR BLD AUTO: 40.7 % (ref 37–48.5)
HGB BLD-MCNC: 12.6 G/DL (ref 12–16)
IMM GRANULOCYTES # BLD AUTO: 0.01 K/UL (ref 0–0.04)
IMM GRANULOCYTES NFR BLD AUTO: 0.2 % (ref 0–0.5)
LYMPHOCYTES # BLD AUTO: 1.7 K/UL (ref 1–4.8)
LYMPHOCYTES NFR BLD: 25.1 % (ref 18–48)
MCH RBC QN AUTO: 32.5 PG (ref 27–31)
MCHC RBC AUTO-ENTMCNC: 31 G/DL (ref 32–36)
MCV RBC AUTO: 105 FL (ref 82–98)
MONOCYTES # BLD AUTO: 0.7 K/UL (ref 0.3–1)
MONOCYTES NFR BLD: 10.7 % (ref 4–15)
NEUTROPHILS # BLD AUTO: 3.8 K/UL (ref 1.8–7.7)
NEUTROPHILS NFR BLD: 56.9 % (ref 38–73)
NRBC BLD-RTO: 0 /100 WBC
PHOSPHATE SERPL-MCNC: 5.1 MG/DL (ref 2.7–4.5)
PHOSPHATE SERPL-MCNC: 5.1 MG/DL (ref 2.7–4.5)
PLATELET # BLD AUTO: 225 K/UL (ref 150–450)
PMV BLD AUTO: 10.6 FL (ref 9.2–12.9)
POTASSIUM SERPL-SCNC: 3.7 MMOL/L (ref 3.5–5.1)
PTH-INTACT SERPL-MCNC: 108.2 PG/ML (ref 9–77)
RBC # BLD AUTO: 3.88 M/UL (ref 4–5.4)
SODIUM SERPL-SCNC: 143 MMOL/L (ref 136–145)
T4 FREE SERPL-MCNC: 1.03 NG/DL (ref 0.71–1.51)
TSH SERPL DL<=0.005 MIU/L-ACNC: 3.58 UIU/ML (ref 0.4–4)
WBC # BLD AUTO: 6.61 K/UL (ref 3.9–12.7)

## 2023-04-27 PROCEDURE — 83970 ASSAY OF PARATHORMONE: CPT | Performed by: INTERNAL MEDICINE

## 2023-04-27 PROCEDURE — 83880 ASSAY OF NATRIURETIC PEPTIDE: CPT | Performed by: NURSE PRACTITIONER

## 2023-04-27 PROCEDURE — 84443 ASSAY THYROID STIM HORMONE: CPT | Performed by: NURSE PRACTITIONER

## 2023-04-27 PROCEDURE — 80069 RENAL FUNCTION PANEL: CPT | Performed by: INTERNAL MEDICINE

## 2023-04-27 PROCEDURE — 82746 ASSAY OF FOLIC ACID SERUM: CPT | Performed by: INTERNAL MEDICINE

## 2023-04-27 PROCEDURE — 85379 FIBRIN DEGRADATION QUANT: CPT | Performed by: NURSE PRACTITIONER

## 2023-04-27 PROCEDURE — 82607 VITAMIN B-12: CPT | Performed by: INTERNAL MEDICINE

## 2023-04-27 PROCEDURE — 85025 COMPLETE CBC W/AUTO DIFF WBC: CPT | Performed by: INTERNAL MEDICINE

## 2023-04-27 PROCEDURE — 84439 ASSAY OF FREE THYROXINE: CPT | Performed by: NURSE PRACTITIONER

## 2023-04-27 PROCEDURE — 36415 COLL VENOUS BLD VENIPUNCTURE: CPT | Performed by: NURSE PRACTITIONER

## 2023-04-27 NOTE — TELEPHONE ENCOUNTER
----- Message from Heather Miller NP sent at 4/27/2023  8:52 AM CDT -----  Please let Ms Rg know Dr Kelly recommends the additional procedure he previously discussed with her. I'm waiting to hear from him about an appt sooner than July.

## 2023-04-28 ENCOUNTER — TELEPHONE (OUTPATIENT)
Dept: PRIMARY CARE CLINIC | Facility: CLINIC | Age: 88
End: 2023-04-28
Payer: MEDICARE

## 2023-04-28 LAB
D DIMER PPP IA.FEU-MCNC: 0.59 MG/L FEU
FOLATE SERPL-MCNC: 8.8 NG/ML (ref 4–24)
VIT B12 SERPL-MCNC: 454 PG/ML (ref 210–950)

## 2023-04-28 NOTE — TELEPHONE ENCOUNTER
Pt called with update- states that she had a good day yesterday.  Blood pressure this am 122/75 P 112

## 2023-04-29 NOTE — PROGRESS NOTES
[facility] has [facility] - if message below not viewed please call w information below:     Recommend to continue taking good quality B complex vitamin. Check that it includes at least 400 mcg folate - vitamin B9.    Please message or call with any questions or concerns!  Thank you!  Phylicia Deleon MD, MPH  [facility]

## 2023-05-01 ENCOUNTER — TELEPHONE (OUTPATIENT)
Dept: PRIMARY CARE CLINIC | Facility: CLINIC | Age: 88
End: 2023-05-01
Payer: MEDICARE

## 2023-05-01 NOTE — TELEPHONE ENCOUNTER
Called patient and notified of the results and recommendations. Patient verbalized understanding, Najma Zhao

## 2023-05-01 NOTE — TELEPHONE ENCOUNTER
----- Message from Phylicia Deleon MD sent at 4/29/2023  3:36 PM CDT -----  Pt has [facility] - if message below not viewed please call w information below:     Recommend to continue taking good quality B complex vitamin. Check that it includes at least 400 mcg folate - vitamin B9.    Please message or call with any questions or concerns!  Thank you!  Phylicia Deleon MD, MPH  [facility]

## 2023-05-02 ENCOUNTER — OFFICE VISIT (OUTPATIENT)
Dept: OPHTHALMOLOGY | Facility: CLINIC | Age: 88
End: 2023-05-02
Payer: MEDICARE

## 2023-05-02 DIAGNOSIS — H40.1131 PRIMARY OPEN ANGLE GLAUCOMA (POAG) OF BOTH EYES, MILD STAGE: Primary | ICD-10-CM

## 2023-05-02 DIAGNOSIS — M35.01 KERATITIS SICCA, BILATERAL: ICD-10-CM

## 2023-05-02 DIAGNOSIS — Z96.1 PSEUDOPHAKIA: ICD-10-CM

## 2023-05-02 PROCEDURE — 99999 PR PBB SHADOW E&M-EST. PATIENT-LVL III: ICD-10-PCS | Mod: PBBFAC,,, | Performed by: OPHTHALMOLOGY

## 2023-05-02 PROCEDURE — 1159F MED LIST DOCD IN RCRD: CPT | Mod: CPTII,S$GLB,, | Performed by: OPHTHALMOLOGY

## 2023-05-02 PROCEDURE — 92133 POSTERIOR SEGMENT OCT OPTIC NERVE(OCULAR COHERENCE TOMOGRAPHY) - OU - BOTH EYES: ICD-10-PCS | Mod: S$GLB,,, | Performed by: OPHTHALMOLOGY

## 2023-05-02 PROCEDURE — 1159F PR MEDICATION LIST DOCUMENTED IN MEDICAL RECORD: ICD-10-PCS | Mod: CPTII,S$GLB,, | Performed by: OPHTHALMOLOGY

## 2023-05-02 PROCEDURE — 99214 PR OFFICE/OUTPT VISIT, EST, LEVL IV, 30-39 MIN: ICD-10-PCS | Mod: S$GLB,,, | Performed by: OPHTHALMOLOGY

## 2023-05-02 PROCEDURE — 99999 PR PBB SHADOW E&M-EST. PATIENT-LVL III: CPT | Mod: PBBFAC,,, | Performed by: OPHTHALMOLOGY

## 2023-05-02 PROCEDURE — 92083 EXTENDED VISUAL FIELD XM: CPT | Mod: S$GLB,,, | Performed by: OPHTHALMOLOGY

## 2023-05-02 PROCEDURE — 1160F PR REVIEW ALL MEDS BY PRESCRIBER/CLIN PHARMACIST DOCUMENTED: ICD-10-PCS | Mod: CPTII,S$GLB,, | Performed by: OPHTHALMOLOGY

## 2023-05-02 PROCEDURE — 1160F RVW MEDS BY RX/DR IN RCRD: CPT | Mod: CPTII,S$GLB,, | Performed by: OPHTHALMOLOGY

## 2023-05-02 PROCEDURE — 99214 OFFICE O/P EST MOD 30 MIN: CPT | Mod: S$GLB,,, | Performed by: OPHTHALMOLOGY

## 2023-05-02 PROCEDURE — 92133 CPTRZD OPH DX IMG PST SGM ON: CPT | Mod: S$GLB,,, | Performed by: OPHTHALMOLOGY

## 2023-05-02 PROCEDURE — 92083 HUMPHREY VISUAL FIELD - OU - BOTH EYES: ICD-10-PCS | Mod: S$GLB,,, | Performed by: OPHTHALMOLOGY

## 2023-05-02 RX ORDER — TIMOLOL MALEATE 5 MG/ML
1 SOLUTION/ DROPS OPHTHALMIC EVERY MORNING
Qty: 5 ML | Refills: 12 | Status: SHIPPED | OUTPATIENT
Start: 2023-05-02 | End: 2023-06-13 | Stop reason: ALTCHOICE

## 2023-05-02 RX ORDER — BRINZOLAMIDE 10 MG/ML
1 SUSPENSION/ DROPS OPHTHALMIC EVERY MORNING
Qty: 2 ML | Refills: 0 | Status: CANCELLED | OUTPATIENT
Start: 2023-05-02 | End: 2023-06-01

## 2023-05-02 NOTE — PROGRESS NOTES
SUBJECTIVE  Shirley Metz is 89 y.o. female  Corrected distance visual acuity was 20/30 -1 in the right eye and 20/30 -2 in the left eye.   Chief Complaint   Patient presents with    Glaucoma     Pt reports for HVF, GOCT, IOP. Denies any pain or irritation. Va blurry. 100% compliant with gtts. Eyes dry OU. Noted some trouble reading.           HPI     Glaucoma     Additional comments: Pt reports for HVF, GOCT, IOP. Denies any pain or   irritation. Va blurry. 100% compliant with gtts. Eyes dry OU. Noted some   trouble reading.            Comments      1. Mild COAG Goal < 19   SLT OD 3/04 (20 to 15) + 3/11 (19 to 15)   SLT OS 5/05 (20 to 14) +5/11 (18-19 to 15) + 3/12 (min resp.) + 3/11/15   (20.5-17.5) + 3/7/18 (24- 21) + 11/3/21 (22.5-17.5)  (Cosopt, Xalatan, and Timolol cause Myalgias)   (Alphagan causes redness) (zioptan too expensive)   Possible steroid responder   2. PCIOL OU   3. Mild Dry AMD   4. Fuch's Dystrophy   DSAEK OD 4/16 Dr. Quintanilla (followed by [facility] every summer)   DSAEK OS 8/15 Dr. Quintanilla   Rx Inveltys (1% lotemax) (IOP 15/23) 5/29/20   Dr. Quintanilla Ok'd to stop Lotemax OU  5. Dry Eye -intolerance to Restasis   6. Recent A-Fib. (from Labetolol ?)       Systane Ultra 4-5 tiimes daily   Travatan Z qhs os   Rhopressa qhs OS   Azopt BID OS (not using)    +HAG            Last edited by Toi Garcia on 5/2/2023 10:07 AM.         Assessment /Plan :  1. Primary open angle glaucoma (POAG) of both eyes, mild stage   Doing well, intraocular pressure (IOP) OD within acceptable range relative to target IOP and no evidence of progression. Continue current treatment. Reviewed importance of continued compliance with treatment and follow up.    Intraocular pressure (IOP) OS not within acceptable range relative to target IOP with risk of irreversible visual loss. Better IOP control is recommended. Treatment options may include change or additional medications, Selective Laser Trabeculoplasty (SLT laser),  and/or incisional glaucoma surgery. Reviewed importance of continued compliance with treatment and follow up.     Patient chooses  to add Timolol one drop in the left eye every morning  If patient has problems with Timolol then change back to Azopt    Patient instructed to continue using the following glaucoma medication as follows:  Rhopressa one drop in the left eye nightly, Travatan-Z one drop in the left eye nightly, and Timolol one drop in the left eye every morning    Return to clinic in  2-3 months   or as needed.  With IOP Check     2. Keratitis sicca, bilateral  -- Condition stable, no therapeutic change required. Monitoring routinely.     3. Pseudophakia  -- Condition stable, no therapeutic change required. Monitoring routinely.

## 2023-05-05 ENCOUNTER — LAB VISIT (OUTPATIENT)
Dept: LAB | Facility: HOSPITAL | Age: 88
End: 2023-05-05
Attending: INTERNAL MEDICINE
Payer: MEDICARE

## 2023-05-05 DIAGNOSIS — N19 HYPERTENSIVE HEART AND RENAL DISEASE WITH BOTH (CONGESTIVE) HEART FAILURE AND RENAL FAILURE: ICD-10-CM

## 2023-05-05 DIAGNOSIS — I13.2 HYPERTENSIVE HEART AND RENAL DISEASE WITH BOTH (CONGESTIVE) HEART FAILURE AND RENAL FAILURE: ICD-10-CM

## 2023-05-05 LAB
BACTERIA #/AREA URNS AUTO: ABNORMAL /HPF
BILIRUB UR QL STRIP: NEGATIVE
CLARITY UR REFRACT.AUTO: CLEAR
COLOR UR AUTO: YELLOW
CREAT UR-MCNC: 119 MG/DL (ref 15–325)
GLUCOSE UR QL STRIP: NEGATIVE
HGB UR QL STRIP: NEGATIVE
HYALINE CASTS UR QL AUTO: 6 /LPF
KETONES UR QL STRIP: NEGATIVE
LEUKOCYTE ESTERASE UR QL STRIP: ABNORMAL
MICROSCOPIC COMMENT: ABNORMAL
NITRITE UR QL STRIP: NEGATIVE
NON-SQ EPI CELLS #/AREA URNS AUTO: 2 /HPF
PH UR STRIP: 5 [PH] (ref 5–8)
PROT UR QL STRIP: NEGATIVE
PROT UR-MCNC: 16 MG/DL (ref 0–15)
PROT/CREAT UR: 0.13 MG/G{CREAT} (ref 0–0.2)
RBC #/AREA URNS AUTO: 4 /HPF (ref 0–4)
SP GR UR STRIP: 1.01 (ref 1–1.03)
SQUAMOUS #/AREA URNS AUTO: 10 /HPF
URN SPEC COLLECT METH UR: ABNORMAL
WBC #/AREA URNS AUTO: 18 /HPF (ref 0–5)
YEAST UR QL AUTO: ABNORMAL

## 2023-05-05 PROCEDURE — 82570 ASSAY OF URINE CREATININE: CPT | Performed by: INTERNAL MEDICINE

## 2023-05-05 PROCEDURE — 81001 URINALYSIS AUTO W/SCOPE: CPT | Performed by: INTERNAL MEDICINE

## 2023-05-09 ENCOUNTER — OFFICE VISIT (OUTPATIENT)
Dept: NEPHROLOGY | Facility: CLINIC | Age: 88
End: 2023-05-09
Payer: MEDICARE

## 2023-05-09 VITALS
DIASTOLIC BLOOD PRESSURE: 90 MMHG | BODY MASS INDEX: 29.93 KG/M2 | RESPIRATION RATE: 18 BRPM | WEIGHT: 175.31 LBS | SYSTOLIC BLOOD PRESSURE: 144 MMHG | HEART RATE: 100 BPM | HEIGHT: 64 IN

## 2023-05-09 DIAGNOSIS — I50.33 ACUTE ON CHRONIC DIASTOLIC CONGESTIVE HEART FAILURE: ICD-10-CM

## 2023-05-09 DIAGNOSIS — N18.4 STAGE 4 CHRONIC KIDNEY DISEASE: Primary | ICD-10-CM

## 2023-05-09 PROCEDURE — 1126F PR PAIN SEVERITY QUANTIFIED, NO PAIN PRESENT: ICD-10-PCS | Mod: CPTII,S$GLB,, | Performed by: INTERNAL MEDICINE

## 2023-05-09 PROCEDURE — 3288F PR FALLS RISK ASSESSMENT DOCUMENTED: ICD-10-PCS | Mod: CPTII,S$GLB,, | Performed by: INTERNAL MEDICINE

## 2023-05-09 PROCEDURE — 1126F AMNT PAIN NOTED NONE PRSNT: CPT | Mod: CPTII,S$GLB,, | Performed by: INTERNAL MEDICINE

## 2023-05-09 PROCEDURE — 1159F MED LIST DOCD IN RCRD: CPT | Mod: CPTII,S$GLB,, | Performed by: INTERNAL MEDICINE

## 2023-05-09 PROCEDURE — 1101F PT FALLS ASSESS-DOCD LE1/YR: CPT | Mod: CPTII,S$GLB,, | Performed by: INTERNAL MEDICINE

## 2023-05-09 PROCEDURE — 1101F PR PT FALLS ASSESS DOC 0-1 FALLS W/OUT INJ PAST YR: ICD-10-PCS | Mod: CPTII,S$GLB,, | Performed by: INTERNAL MEDICINE

## 2023-05-09 PROCEDURE — 99999 PR PBB SHADOW E&M-EST. PATIENT-LVL IV: CPT | Mod: PBBFAC,,, | Performed by: INTERNAL MEDICINE

## 2023-05-09 PROCEDURE — 99215 PR OFFICE/OUTPT VISIT, EST, LEVL V, 40-54 MIN: ICD-10-PCS | Mod: S$GLB,,, | Performed by: INTERNAL MEDICINE

## 2023-05-09 PROCEDURE — 99215 OFFICE O/P EST HI 40 MIN: CPT | Mod: S$GLB,,, | Performed by: INTERNAL MEDICINE

## 2023-05-09 PROCEDURE — 1159F PR MEDICATION LIST DOCUMENTED IN MEDICAL RECORD: ICD-10-PCS | Mod: CPTII,S$GLB,, | Performed by: INTERNAL MEDICINE

## 2023-05-09 PROCEDURE — 99999 PR PBB SHADOW E&M-EST. PATIENT-LVL IV: ICD-10-PCS | Mod: PBBFAC,,, | Performed by: INTERNAL MEDICINE

## 2023-05-09 PROCEDURE — 3288F FALL RISK ASSESSMENT DOCD: CPT | Mod: CPTII,S$GLB,, | Performed by: INTERNAL MEDICINE

## 2023-05-09 RX ORDER — FUROSEMIDE 20 MG/1
20 TABLET ORAL 2 TIMES DAILY
Qty: 180 TABLET | Refills: 1 | Status: SHIPPED | OUTPATIENT
Start: 2023-05-09 | End: 2023-11-05

## 2023-05-09 NOTE — PROGRESS NOTES
"Renal clinic f/u note:  Date of clinic visit: 5/9/23  Reason for f/u and chief c/o: CKD and HTN     HPI: Pt is a 89 y/o female with CKD stage 3, with slow recent worsening in Cr with some fluctuations, and h/o of HTN, who presents for f/u. Pt was last seen in renal clinic in Sept 2022. Pt has no new or acute c/o's, but is concerned about her low BP. She has a record of her home BP measurements. Most SBP's are in 100's to 110'2. She has no CP, no SOB, no leg swelling. Pt does have a h/o of AF, for which she is following with cardiology. Meds reviewed, pt stopped losartan, and says is feeling better after stopping. On both metoprolol and midodrine (for "when I have low BP) and on lasix 40 mg po bid.       PAST MEDICAL HISTORY: CKD stage 3 bordering on stage 4, HTN, DM-2, diastolic CHF, atrial fibrillation, Arthritis, Basal cell carcinoma (03/29/2018), Colon polyps, Fuchs' corneal dystrophy, Glaucoma, Hyperlipidemia, Macular degeneration (dry), Obesity, PVD (peripheral vascular disease), Squamous cell carcinoma (01/08/2019), Stenosis of carotid artery (10/5/2021), Urinary tract infection.     PAST SURGICAL HISTORY:  She  has a past surgical history that includes Carpal tunnel release (Right, 2012 approx); left thumb (Left, 2011); Cataract extraction (Bilateral, 1994); Corneal transplant (Bilateral, 08/2015); SLT- OS (aka TRABECULOPLASTY) (Left, 05/11/2011); Breast cyst excision (Left, 1997 approx); Tonsillectomy (1938); Adenoidectomy (1938); Cholecystectomy (1975); Eye surgery; Eye surgery; Breast biopsy (Left); Reverse total shoulder arthroplasty (Left, 8/21/2018); Tenotomy (Left, 8/21/2018); Skin cancer excision (Left, 2017); Treatment of cardiac arrhythmia (N/A, 10/10/2019); and Treatment of cardiac arrhythmia (N/A, 12/6/2019).     SOCIAL HISTORY:  She  reports that she has never smoked. She has never used smokeless tobacco. She reports current alcohol use of about 2.0 standard drinks per week. She reports that she " does not use drugs.     FAMILY MEDICAL HISTORY:  Her family history includes Cancer (age of onset: 88) in her father; Heart disease in her mother; Hypertension in her mother.           Review of patient's allergies indicates:   Allergen Reactions    Carvedilol Swelling       lips  lips  Other reaction(s): swelling    Cephalexin Other (See Comments)       Muscle/joint weakness unable to move     Doxycycline calcium Other (See Comments)       Weakness nausea   sob  Other reaction(s): weakness, nausea, SOB    Erythromycin Other (See Comments)       Complete weakness/could not walk    Gadolinium-containing contrast media Other (See Comments)       angioedema  Other reaction(s): Angioedema    Hydrocodone-acetaminophen         wheezing  wheezing  wheezing  Other reaction(s): wheezing    Inveltys [loteprednol etabonate] Palpitations and Other (See Comments)       Patient stated bp went up.    Labetalol Shortness Of Breath, Nausea Only and Other (See Comments)       Palpitations, LE weakness  Other reaction(s): SOB, palpitations, weakness    Loratadine Other (See Comments)       Double vision/spots  Other reaction(s): double vision    Macrolide antibiotics Other (See Comments)       Complete weakness/could not walk  Complete weakness/could not walk  Complete weakness/could not walk  Complete weakness/could not walk  Other reaction(s): complete weakness    Moxifloxacin Other (See Comments)       Hives   muscle soreness  Other reaction(s): hives, muscle soreness    Naproxen         wheezing  wheezing  wheezing  Other reaction(s): wheezing    Statins-hmg-coa reductase inhibitors Other (See Comments)       Severe Muscle weakness  Muscle weakness  Severe Muscle weakness  Other reaction(s): severe muscle weakness    Timolol Other (See Comments)       Severe muscle weakness, eye problems.  Other reaction(s): severe muscle weakness    Verapamil Diarrhea       Severe diarrhea.  Other reaction(s): diarrhea    Amlodipine          Myalgias     Other reaction(s): myalgian    Doxycycline      Doxycycline hyclate         Other reaction(s): weakness, nausea, SOB    Fenofibrate         Causes muscle weakness  Other reaction(s): muscle weakness    Hydralazine         Other reaction(s): muscle tremors    Hydralazine analogues         Muscle tremors/aches       Hydrocortisone      Isosorbide         Tolerates Imdur    Loteprednol         Other reaction(s): increased BP    Tetanus and diphtheria toxoids      Adhesive Rash       Clonidine patch - 2 mfg - contact dermatitis    Codeine Diarrhea and Other (See Comments)       Loss of balance   Other reaction(s): loss of balance    Doxazosin Palpitations       Other reaction(s): palpitations    Fexofenadine Other (See Comments)       Double vision/spots   Other reaction(s): double vision    Hydrocortisone (bulk) Other (See Comments)       Only suppository/ caused muscle weakness    Latanoprost Other (See Comments)       Muscle weakness  Other reaction(s): muscle weakness    Olopatadine Other (See Comments)       Muscle weakness  Other reaction(s): muscle weakness    Prednisone Anxiety      Meds reviewed    Current Outpatient Medications:     acetaminophen (TYLENOL) 500 MG tablet, Take 500 mg by mouth nightly as needed., Disp: , Rfl:     ALPRAZolam (XANAX) 0.5 MG tablet, Take 1 tablet (0.5 mg total) by mouth nightly as needed for Anxiety., Disp: 30 tablet, Rfl: 5    apixaban (ELIQUIS) 2.5 mg Tab, Take 1 tablet (2.5 mg total) by mouth 2 (two) times daily., Disp: 180 tablet, Rfl: 3    b complex vitamins capsule, Take 1 capsule by mouth once daily., Disp: , Rfl:     brinzolamide (AZOPT) 1 % ophthalmic suspension, Place 1 drop into the left eye every morning., Disp: 2 mL, Rfl: 0    cholecalciferol, vitamin D3, (VITAMIN D3) 50 mcg (2,000 unit) Cap, Take 1 capsule by mouth once daily., Disp: , Rfl:     coenzyme Q10 200 mg capsule, Take 200 mg by mouth once daily., Disp: , Rfl:     fish oil-omega-3 fatty acids  "300-1,000 mg capsule, Take 1 capsule by mouth once daily., Disp: , Rfl:     metoprolol tartrate (LOPRESSOR) 25 MG tablet, Take 1 tablet (25 mg total) by mouth 2 (two) times daily., Disp: 180 tablet, Rfl: 3    midodrine (PROAMATINE) 5 MG Tab, Take 1 tablet (5 mg total) by mouth 3 (three) times daily as needed (for BP < 100/70)., Disp: 180 tablet, Rfl: 1    POLYETHYLENE GLYCOL 3350 (MIRALAX ORAL), Take 17 g by mouth 2 (two) times a day. Taking BID, Disp: , Rfl:     RHOPRESSA 0.02 % ophthalmic solution, PLACE 1 DROP INTO THE LEFT EYE EVERY EVENING., Disp: 2.5 mL, Rfl: 11    sars-cov-2, covid-19 pfizer omicron, (PFIZER COVID BIVAL,12Y UP,,PF,) 30 mcg/0.3 mL injection, Inject into the muscle., Disp: 0.3 mL, Rfl: 0    timolol maleate 0.5% (TIMOPTIC) 0.5 % Drop, Place 1 drop into the left eye every morning., Disp: 5 mL, Rfl: 12    TRAVATAN Z 0.004 % ophthalmic solution, Place 1 drop into the left eye every evening., Disp: 2.5 mL, Rfl: 12    furosemide (LASIX) 20 mg po bid         REVIEW OF SYSTEMS:  Patient has no fever, fatigue, visual changes, chest pain, edema, cough, dyspnea, nausea, vomiting, constipation, diarrhea, arthralgias, pruritis, dizziness, weakness, depression, confusion.      PHYSICAL EXAM:  Blood pressure (!) 144/90, pulse 100, resp. rate 18, height 5' 4" (1.626 m), weight 79.5 kg (175 lb 4.8 oz), wt last visit was 186 lbs  SBP's at home 100's to 110's (per pt's records)  Gen:    WDWN female in no apparent distress  Psych: Normal mood and affect  Skin:    No rashes or ulcers  Neck:   No JVD  Chest:  Clear with no rales, rhonchi, wheezing with normal effort  CV:      Irregular, no murmurs, gallops or rubs  Abd:     Soft, nontender, no distension  Ext:      No edema     Labs reviewed:  BMP  Lab Results   Component Value Date     04/27/2023    K 3.7 04/27/2023    CL 99 04/27/2023    CO2 25 04/27/2023    BUN 38 (H) 04/27/2023    CREATININE 2.1 (H) 04/27/2023    CALCIUM 10.1 04/27/2023    ANIONGAP 19 (H) " 04/27/2023    EGFRNORACEVR 22.0 (A) 04/27/2023     Lab Results   Component Value Date    WBC 6.61 04/27/2023    HGB 12.6 04/27/2023    HCT 40.7 04/27/2023     (H) 04/27/2023     04/27/2023       Lab Results   Component Value Date    .2 (H) 04/27/2023    CALCIUM 10.1 04/27/2023    PHOS 5.1 (H) 04/27/2023    PHOS 5.1 (H) 04/27/2023          HgA1c 6.2%  U/a: no protein, no blood, no casts  Urine p/Cr 130, from 180 mg        IMPRESSION AND RECOMMENDATIONS: 89 y/o female with CKD prior stage 3 presents for f/u     Renal: s Cr noted slowly worse with some fluctuations  Pt is an elderly, likely has significant vascular disease  With BP being kept so low, there is likely reduced end organ perfusion, causing rise in Cr with typical fluctuations.  Pt appears over-diuresed  No significant fluid gain  Dose of diuretics should be adjusted proportional to the degree of fluidgain  Will lower last from 40 to 20 mg po qd to bid    CKD stage 3, bordering on stage 4  H/o of DM and HTN  Microalbuminuria, likely diabetic nephropathy. Is on losartan    Complications of CKD:  K normal  Na normal  Acid base no issues  Ca normal  PO4 normal  Secondary hyperparathyroidism, mild  Not anemic  Fluid status, slightly vascularly hypovolemic (as above)     2. HTN: BP controlled to low  Medes reviewed  Discussed in details with pt, will lower lasix dose as above     3. Cardiac: cardiac issues reviewed with pt  H/o of diastolic CHF. Well compensated  H/o of AF. Is in rate controlled AF  TSH and free T4 normal     Plans and recommendations:  As discussed above  Total time spent 40 minutes including time needed to review the records, the   patient evaluation, documentation, face-to-face discussion with the patient,   more than 50% of the time was spent on coordination of care and counseling.    Level V visit.  RTC 3 months     Paulie Newman MD

## 2023-05-10 ENCOUNTER — HOSPITAL ENCOUNTER (OUTPATIENT)
Dept: CARDIOLOGY | Facility: HOSPITAL | Age: 88
Discharge: HOME | End: 2023-05-10
Attending: INTERNAL MEDICINE
Payer: MEDICARE

## 2023-05-10 DIAGNOSIS — I48.92 ATRIAL FLUTTER WITH RAPID VENTRICULAR RESPONSE: ICD-10-CM

## 2023-05-10 DIAGNOSIS — I48.0 PAF (PAROXYSMAL ATRIAL FIBRILLATION): ICD-10-CM

## 2023-05-10 DIAGNOSIS — I11.9 HYPERTENSIVE LEFT VENTRICULAR HYPERTROPHY, WITHOUT HEART FAILURE: ICD-10-CM

## 2023-05-10 PROCEDURE — 93227 XTRNL ECG REC<48 HR R&I: CPT | Mod: ,,, | Performed by: INTERNAL MEDICINE

## 2023-05-10 PROCEDURE — 93227 HOLTER MONITOR - 48 HOUR (CUPID ONLY): ICD-10-PCS | Mod: ,,, | Performed by: INTERNAL MEDICINE

## 2023-05-10 PROCEDURE — 93225 XTRNL ECG REC<48 HRS REC: CPT

## 2023-05-16 ENCOUNTER — TELEPHONE (OUTPATIENT)
Dept: CARDIOLOGY | Facility: CLINIC | Age: 88
End: 2023-05-16
Payer: MEDICARE

## 2023-05-16 ENCOUNTER — PATIENT MESSAGE (OUTPATIENT)
Dept: CARDIOLOGY | Facility: CLINIC | Age: 88
End: 2023-05-16
Payer: MEDICARE

## 2023-05-16 DIAGNOSIS — I48.0 PAF (PAROXYSMAL ATRIAL FIBRILLATION): Chronic | ICD-10-CM

## 2023-05-16 LAB
OHS CV EVENT MONITOR DAY: 0
OHS CV HOLTER LENGTH DECIMAL HOURS: 48
OHS CV HOLTER LENGTH HOURS: 48
OHS CV HOLTER LENGTH MINUTES: 0
OHS CV HOLTER SINUS AVERAGE HR: 100
OHS CV HOLTER SINUS MAX HR: 143
OHS CV HOLTER SINUS MIN HR: 64

## 2023-05-16 RX ORDER — MIDODRINE HYDROCHLORIDE 10 MG/1
10 TABLET ORAL 3 TIMES DAILY PRN
Qty: 180 TABLET | Refills: 1 | Status: SHIPPED | OUTPATIENT
Start: 2023-05-16 | End: 2023-06-28 | Stop reason: SDUPTHER

## 2023-05-16 RX ORDER — METOPROLOL TARTRATE 50 MG/1
50 TABLET ORAL 2 TIMES DAILY
Qty: 60 TABLET | Refills: 3 | Status: SHIPPED | OUTPATIENT
Start: 2023-05-16 | End: 2023-06-28 | Stop reason: SDUPTHER

## 2023-05-16 NOTE — TELEPHONE ENCOUNTER
Pt is concerned about increasing metoprolol because lowest reading of her blood pressure 96/57 highest bp was 138/89. Pt states that on average they are less than 120/ 75. Pt states that dr storm decreased her lasix to 20 mg daily and she states that she is having swelling in her abd pb      Pt rtnd my call called her home and there was no answer. Called her cell phone listed on chart notified the pt of the information listed below and she verbalized understanding pb       ----- Message from Trevon Ramirez MD sent at 5/16/2023  1:08 PM CDT -----  Please contact the patient and let them know that their results of Holter reveals Atrial flutter with rapid rates along with frequent PVCs and PACs - will increase metoprolol to 50mg twice a day, monitor BP and heart rates daily, need to follow up with Dr. Robin givens or [facility] and admission if heart rates do not improve, may need another urgent cardioversion.

## 2023-05-16 NOTE — TELEPHONE ENCOUNTER
I returned pts call regarding increasing metoprolol to 50mg bid due to abn holter results.     Pt states her concern with this is that her bp is already low. Upper 90's to low 100's systolic and between 50-70's diastolic.     Please advise on what she should do as she is worried her bp will drop too low if she increased the metoprolol.

## 2023-05-16 NOTE — TELEPHONE ENCOUNTER
Trevon Ramirez MD  to Me  Opal Redd LPN         2:06 PM   If her swelling continue to get worse which it may since her lasix was decreased I recommend her to go to ER and may need admission for IV diuresis and close monitoring of BP and renal function.  Thanks   Trevon Ramirez MD  to Me  Opal Redd LPN         2:05 PM   OK I am increasing her midodrine to 10 mg TID, she needs to try to take it every 6-8 hours that will definetely improve her BP, the increase in metoprolol will slow her heart rates and also improve BP, if her BP remains low and does not want to increase metoprolol then I can reorder her 25mg BID dose. Thanks       The patient has been notified of this information and all questions answered.

## 2023-05-16 NOTE — TELEPHONE ENCOUNTER
----- Message from Kelsie Gomez sent at 5/16/2023  1:39 PM CDT -----  Contact: Shirley  Type:  Patient Returning Call    Who Called: Shirley  Who Left Message for Patient:Opal  Does the patient know what this is regarding?: Medication  Would the patient rather a call back or a response via [facility]? Call back  Best Call Back Number:[phone] or [phone]  Additional Information    Thanks  LJ

## 2023-05-19 ENCOUNTER — OFFICE VISIT (OUTPATIENT)
Dept: PRIMARY CARE CLINIC | Facility: CLINIC | Age: 88
End: 2023-05-19
Payer: MEDICARE

## 2023-05-19 VITALS
DIASTOLIC BLOOD PRESSURE: 78 MMHG | TEMPERATURE: 98 F | BODY MASS INDEX: 30.76 KG/M2 | OXYGEN SATURATION: 95 % | SYSTOLIC BLOOD PRESSURE: 135 MMHG | WEIGHT: 180.19 LBS | HEART RATE: 88 BPM | HEIGHT: 64 IN

## 2023-05-19 DIAGNOSIS — H40.1131 CHRONIC OPEN ANGLE GLAUCOMA OF BOTH EYES, MILD STAGE: ICD-10-CM

## 2023-05-19 DIAGNOSIS — I48.91 ATRIAL FIBRILLATION WITH RVR: ICD-10-CM

## 2023-05-19 DIAGNOSIS — I13.2 HYPERTENSIVE HEART AND RENAL DISEASE WITH BOTH (CONGESTIVE) HEART FAILURE AND RENAL FAILURE: ICD-10-CM

## 2023-05-19 DIAGNOSIS — N19 HYPERTENSIVE HEART AND RENAL DISEASE WITH BOTH (CONGESTIVE) HEART FAILURE AND RENAL FAILURE: ICD-10-CM

## 2023-05-19 DIAGNOSIS — D64.9 ANEMIA, UNSPECIFIED TYPE: ICD-10-CM

## 2023-05-19 PROCEDURE — 99215 OFFICE O/P EST HI 40 MIN: CPT | Mod: ,,, | Performed by: NURSE PRACTITIONER

## 2023-05-19 PROCEDURE — 99215 PR OFFICE/OUTPT VISIT, EST, LEVL V, 40-54 MIN: ICD-10-PCS | Mod: ,,, | Performed by: NURSE PRACTITIONER

## 2023-05-19 PROCEDURE — 1125F AMNT PAIN NOTED PAIN PRSNT: CPT | Mod: CPTII,,, | Performed by: NURSE PRACTITIONER

## 2023-05-19 PROCEDURE — 99999 PR PBB SHADOW E&M-EST. PATIENT-LVL IV: ICD-10-PCS | Mod: PBBFAC,,, | Performed by: NURSE PRACTITIONER

## 2023-05-19 PROCEDURE — 99214 OFFICE O/P EST MOD 30 MIN: CPT | Mod: PN | Performed by: NURSE PRACTITIONER

## 2023-05-19 PROCEDURE — 1159F PR MEDICATION LIST DOCUMENTED IN MEDICAL RECORD: ICD-10-PCS | Mod: CPTII,,, | Performed by: NURSE PRACTITIONER

## 2023-05-19 PROCEDURE — 3288F PR FALLS RISK ASSESSMENT DOCUMENTED: ICD-10-PCS | Mod: CPTII,,, | Performed by: NURSE PRACTITIONER

## 2023-05-19 PROCEDURE — 99999 PR PBB SHADOW E&M-EST. PATIENT-LVL IV: CPT | Mod: PBBFAC,,, | Performed by: NURSE PRACTITIONER

## 2023-05-19 PROCEDURE — 3288F FALL RISK ASSESSMENT DOCD: CPT | Mod: CPTII,,, | Performed by: NURSE PRACTITIONER

## 2023-05-19 PROCEDURE — 1101F PR PT FALLS ASSESS DOC 0-1 FALLS W/OUT INJ PAST YR: ICD-10-PCS | Mod: CPTII,,, | Performed by: NURSE PRACTITIONER

## 2023-05-19 PROCEDURE — 1159F MED LIST DOCD IN RCRD: CPT | Mod: CPTII,,, | Performed by: NURSE PRACTITIONER

## 2023-05-19 PROCEDURE — 1101F PT FALLS ASSESS-DOCD LE1/YR: CPT | Mod: CPTII,,, | Performed by: NURSE PRACTITIONER

## 2023-05-19 PROCEDURE — 1125F PR PAIN SEVERITY QUANTIFIED, PAIN PRESENT: ICD-10-PCS | Mod: CPTII,,, | Performed by: NURSE PRACTITIONER

## 2023-05-19 NOTE — ASSESSMENT & PLAN NOTE
Plans to start timolol. Reports BLE weakness while taking previously.   Followed closely by ophthalmology.

## 2023-05-19 NOTE — ASSESSMENT & PLAN NOTE
BP improving with midodrine. She will increase to the recommended 10 mg TID dose and continue to monitor.

## 2023-05-19 NOTE — ASSESSMENT & PLAN NOTE
Discussed importance of increasing beta blocker to recommended dose. She is fearful of not tolerating since starting timolol with higher dose metoprolol. She will start timolol first then plans to increase metoprolol dose.

## 2023-05-19 NOTE — PROGRESS NOTES
Shirley Metz  05/19/2023  [id number]    Phylicia Deleon MD  Patient Care Team:  Phylicia Deleon MD as PCP - General (Internal Medicine)  Wilbert Ware MD as Consulting Physician (Ophthalmology)  Rajinder Quintanilla MD as Consulting Physician (Ophthalmology)  Suzan Gregorio PA-C as Physician Assistant (Rheumatology)  Rosario Valadez MD as Consulting Physician (Dermatology)  Trevon Ramirez MD as Consulting Physician (Cardiology)  Amish Mendoza MD (Pulmonary Disease)  Jaquan Choi MD as Consulting Physician (Vascular Surgery)  Debbie Choi MD (Inactive) as Consulting Physician (Gynecology)  Emmanuel Fish MD as Consulting Physician (Hand Surgery)  PAXTON Chaidez Jr., MD as Consulting Physician (Orthopedic Surgery)  Phylicia Deleon MD as Physician (Internal Medicine)  Heather Miller NP as Nurse Practitioner (Family Medicine)      [facility] Care Note      Chief Complaint:  Chief Complaint   Patient presents with    Establish Care    Shortness of Breath     X 2 weeks       History of Present Illness:  HPI    Furosemide dose decreased by nephrology 5/9/23. BP low/normal at home then.     Dr Ware recommends taking timolol instead of brinzolamide. She is afraid to use timolol and has not started yet because she had severe leg weakness/in w/c.     Prescribed midodrine PRN to raise SBP to 140.     Raise metoprolol dose? Midodrine changed to TID by cardiology. She has only been taking 5 mg TID. Lasix 20 mg BID recommended. Is to monitor HR.    Palpitations few mornings recently.     Now that furosemide dose decreased more dyspneic and increased ankle edema. Also feels fluid in her trunk.     Feels MUCH better off of losartan! Within a few days much more mobile.     Past two days increased dyspnea with minimal exertion, ambulating to bathroom in apt. No cough, congestion. She had gained 5 lbs after decreasing furosemide dose, now has lost 1 lb.     Home BP cuff, wrist  149/88, manual 136/72.         Review of Systems   Constitutional:  Negative for chills, fever and weight loss.   Respiratory:  Positive for shortness of breath.    Cardiovascular:  Positive for palpitations and leg swelling. Negative for chest pain.   Musculoskeletal:  Negative for joint pain and myalgias.   Neurological:  Negative for dizziness.       The following were reviewed: Active problem list, medication list, allergies, family history, social history, and Health Maintenance.     History:  Past Medical History:   Diagnosis Date    Anemia 11/29/2018    Anxiety 11/28/2017    Arthritis     Atrial fibrillation with RVR 10/9/2019    Back pain     Basal cell carcinoma 03/29/2018    left mid back    Chronic diastolic congestive heart failure 10/15/2019    CKD (chronic kidney disease) stage 3, GFR 30-59 ml/min 8/8/2016    Colon polyps     reported per patient.     Coronary artery calcification 10/5/2021    Fuchs' corneal dystrophy     General anesthetics causing adverse effect in therapeutic use     woke up during surgery and gagged on ET tube    Glaucoma     Glaucoma     Heart failure with preserved left ventricular function (HFpEF) 7/24/2018    Hyperlipidemia     Hypertension     Macular degeneration dry    Obesity     PVD (peripheral vascular disease) 4/26/2021    PVD (peripheral vascular disease) 4/26/2021    Squamous cell carcinoma 01/08/2019    left shoulder    Stenosis of carotid artery 10/5/2021    Trouble in sleeping     Type 2 diabetes mellitus without complication, without long-term current use of insulin 2/24/2021    Urinary tract infection      Past Surgical History:   Procedure Laterality Date    ABLATION OF ARRHYTHMOGENIC FOCUS FOR ATRIAL FIBRILLATION N/A 11/3/2022    Procedure: Ablation atrial fibrillation;  Surgeon: Toi Kelly MD;  Location: [facility] LAB;  Service: Cardiology;  Laterality: N/A;  afib, PVI/CTI, RFA, STACEY (cx if SR), DEDE, ABDIEL orantes, 3 Prep    ADENOIDECTOMY  1938    BREAST  BIOPSY Left     1997. benign    BREAST CYST EXCISION Left 1997 approx    CARPAL TUNNEL RELEASE Right 2012 approx    CATARACT EXTRACTION Bilateral 1994    CHOLECYSTECTOMY  1975    CORNEAL TRANSPLANT Bilateral 08/2015 8/2015 - left; Right 4/2016    EYE SURGERY      Fuch's Corneal dystrophy - had 2nd operation with Dr. Quintanilla    EYE SURGERY      Cataract wIOL    left thumb Left 2011    removed cuboid for arthritis    REVERSE TOTAL SHOULDER ARTHROPLASTY Left 8/21/2018    Procedure: ARTHROPLASTY, SHOULDER, TOTAL, REVERSE;  Surgeon: Solomon Lujan MD;  Location: [facility] OR;  Service: Orthopedics;  Laterality: Left;  WITH BICEP TENODESIS    SKIN CANCER EXCISION Left 2017    BCC removal by Dr. Valadez    SLT- OS (aka TRABECULOPLASTY) Left 05/11/2011    repeat 3/14/12    TENOTOMY Left 8/21/2018    Procedure: TENOTOMY;  Surgeon: Solomon Lujan MD;  Location: [facility] OR;  Service: Orthopedics;  Laterality: Left;    TONSILLECTOMY  1938    TRANSESOPHAGEAL ECHOCARDIOGRAM WITH POSSIBLE CARDIOVERSION (STACEY W/ POSS CARDIOVERSION) N/A 3/21/2023    Procedure: Transesophageal echo (STACEY) intra-procedure log documentation;  Surgeon: Trevon Ramirez MD;  Location: [facility] CATH LAB;  Service: Cardiology;  Laterality: N/A;    TREATMENT OF CARDIAC ARRHYTHMIA N/A 10/10/2019    Procedure: CARDIOVERSION;  Surgeon: Pete Durán MD;  Location: [facility] CATH LAB;  Service: Cardiology;  Laterality: N/A;    TREATMENT OF CARDIAC ARRHYTHMIA N/A 12/6/2019    Procedure: CARDIOVERSION;  Surgeon: Samy Castillo MD;  Location: [facility] CATH LAB;  Service: Cardiology;  Laterality: N/A;     Family History   Problem Relation Age of Onset    Heart disease Mother     Hypertension Mother     Cancer Father 88        sarcoma( ?Kaposi's ) on leg    Deep vein thrombosis Neg Hx     Ovarian cancer Neg Hx     Breast cancer Neg Hx     Kidney disease Neg Hx     Strabismus Neg Hx     Retinal detachment Neg Hx     Macular degeneration Neg Hx     Glaucoma Neg Hx     Blindness Neg Hx      Amblyopia Neg Hx     Eczema Neg Hx     Lupus Neg Hx     Melanoma Neg Hx     Psoriasis Neg Hx     Diabetes Neg Hx     Stroke Neg Hx     Mental retardation Neg Hx     Mental illness Neg Hx     Hyperlipidemia Neg Hx     COPD Neg Hx     Asthma Neg Hx     Depression Neg Hx     Alcohol abuse Neg Hx     Drug abuse Neg Hx      Social History     Socioeconomic History    Marital status:     Number of children: 3   Tobacco Use    Smoking status: Never    Smokeless tobacco: Never    Tobacco comments:     history of passive smoking from her ex-   Substance and Sexual Activity    Alcohol use: Yes     Alcohol/week: 2.0 standard drinks     Types: 2 Standard drinks or equivalent per week     Comment: occ    Drug use: No    Sexual activity: Not Currently     Partners: Male     Birth control/protection: Post-menopausal     Comment: discussed protection for STD's   Other Topics Concern    Are you pregnant or think you may be? No    Breast-feeding No   Social History Narrative     x 2,  x 1. Retired artist - previously doing jewelry/wall pieces; ; lives in [facility]; has 3 sons - 52( lives in [place], 54, and 56;exercises minimally - limited due to back issues. Still drives. Does have a Living Will.     Social Determinants of Health     Physical Activity: Unknown    Days of Exercise per Week: Patient refused    Minutes of Exercise per Session: 10 min   Stress: No Stress Concern Present    Feeling of Stress : Not at all     Patient Active Problem List   Diagnosis    HTN (hypertension)    Mixed hyperlipidemia    Primary osteoarthritis involving multiple joints    Macular degeneration - Both Eyes    Fuch's endothelial dystrophy - Both Eyes    Lens replaced by other means    COAG (chronic open-angle glaucoma) - Both Eyes    Blepharitis, unspecified - Both Eyes    RUIZ (dyspnea on exertion)    Abnormal ECG    PVC's (premature ventricular contractions)    Other microscopic hematuria     LVH (left ventricular hypertrophy) due to hypertensive disease    Osteopenia of hip    Myalgia    Calcification of aorta    Neuropathy    Unequal blood pressures in paired extremities    Medication side effects    History of cornea transplant    Pseudophakia    Insomnia    Anxiety    Pre-diabetes    Obesity (BMI 30.0-34.9)    Elevated troponin    Bilateral shoulder region arthritis    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    Anemia    New onset a-fib    Thrombus of left atrial appendage    Persistent atrial fibrillation    Elevated liver enzymes    Atrial flutter with rapid ventricular response    Chronic anticoagulation    Keratitis sicca, bilateral    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    PAD (peripheral artery disease)    Statin intolerance    Pulmonary nodules/lesions, multiple    Spinal stenosis at L4-L5 level    Pulmonary granuloma    Lumbar spondylosis    Bilateral recurrent inguinal hernia without obstruction or gangrene    LLQ abdominal pain    Pain in left leg    Rectal abnormality    Stenosis of carotid artery    Carotid artery calcification, bilateral    B12 deficiency    Seasonal allergic rhinitis    Atrial fibrillation with RVR    Elevated serum creatinine    Coagulopathy    Atheroma of artery    Chronic diastolic congestive heart failure    Troponin level elevated    Hyperparathyroidism    Sinus tachycardia     Review of patient's allergies indicates:   Allergen Reactions    Carvedilol Swelling     Lip swelling    Cephalexin Other (See Comments)     Muscle/joint weakness unable to move     Doxycycline Shortness Of Breath, Nausea And Vomiting and Other (See Comments)     weakness    Erythromycin Other (See Comments)     Complete weakness/could not walk    Gadolinium-containing contrast media Swelling     angioedema    Hydrocodone-acetaminophen Shortness Of Breath     wheezing    Inveltys [loteprednol etabonate] Palpitations and Other (See  Comments)     Patient stated bp went up.    Labetalol Shortness Of Breath, Nausea Only, Palpitations and Other (See Comments)     weakness    Loratadine Other (See Comments)     Double vision/spots  Other reaction(s): double vision    Losartan Other (See Comments)     weakness    Naproxen      wheezing  wheezing  wheezing  Other reaction(s): wheezing    Statins-hmg-coa reductase inhibitors Other (See Comments)     Severe Muscle weakness  Muscle weakness  Severe Muscle weakness  Other reaction(s): severe muscle weakness    Amlodipine Other (See Comments)     Myalgias    Fenofibrate Other (See Comments)     muscle weakness      Hydralazine Other (See Comments)     muscle tremors    Hydralazine analogues Other (See Comments)     Muscle tremors/aches      Loteprednol Other (See Comments)     increased BP    Macrolide antibiotics Other (See Comments)     Complete weakness/could not walk      Moxifloxacin Hives and Other (See Comments)     muscle soreness    Timolol Other (See Comments)     Blurred vision, Burning eyes, Severe muscle weakness, eye problems.      Verapamil Diarrhea     Severe diarrhea.      Hydrocortisone     Isosorbide      Tolerates Imdur    Tetanus and diphtheria toxoids     Adhesive Rash     Clonidine patch--contact dermatitis    Codeine Diarrhea and Other (See Comments)     Loss of balance       Doxazosin Palpitations    Fexofenadine Other (See Comments)     Double vision/spots       Hydrocortisone (bulk) Other (See Comments)     Only suppository/ caused muscle weakness    Latanoprost Other (See Comments)     Muscle weakness      Olopatadine Other (See Comments)     Muscle weakness      Prednisone Anxiety       Medications:  Current Outpatient Medications on File Prior to Visit   Medication Sig Dispense Refill    acetaminophen (TYLENOL) 500 MG tablet Take 500 mg by mouth nightly as needed.      ALPRAZolam (XANAX) 0.5 MG tablet Take 1 tablet (0.5 mg total) by mouth nightly as needed for Anxiety. 30  tablet 5    apixaban (ELIQUIS) 2.5 mg Tab Take 1 tablet (2.5 mg total) by mouth 2 (two) times daily. 180 tablet 3    b complex vitamins capsule Take 1 capsule by mouth once daily.      brinzolamide (AZOPT) 1 % ophthalmic suspension Place 1 drop into the left eye every morning. 2 mL 0    cholecalciferol, vitamin D3, (VITAMIN D3) 50 mcg (2,000 unit) Cap Take 1 capsule by mouth once daily.      coenzyme Q10 200 mg capsule Take 200 mg by mouth once daily.      fish oil-omega-3 fatty acids 300-1,000 mg capsule Take 1 capsule by mouth once daily.      furosemide (LASIX) 20 MG tablet Take 1 tablet (20 mg total) by mouth 2 (two) times a day. 180 tablet 1    metoprolol tartrate (LOPRESSOR) 50 MG tablet Take 1 tablet (50 mg total) by mouth 2 (two) times daily. 60 tablet 3    midodrine (PROAMATINE) 10 MG tablet Take 1 tablet (10 mg total) by mouth 3 (three) times daily as needed (for BP < 100/70). 180 tablet 1    POLYETHYLENE GLYCOL 3350 (MIRALAX ORAL) Take 17 g by mouth 2 (two) times a day. Taking BID      RHOPRESSA 0.02 % ophthalmic solution PLACE 1 DROP INTO THE LEFT EYE EVERY EVENING. 2.5 mL 11    sars-cov-2, covid-19 pfizer omicron, (PFIZER COVID BIVAL,12Y UP,,PF,) 30 mcg/0.3 mL injection Inject into the muscle. 0.3 mL 0    timolol maleate 0.5% (TIMOPTIC) 0.5 % Drop Place 1 drop into the left eye every morning. 5 mL 12    TRAVATAN Z 0.004 % ophthalmic solution Place 1 drop into the left eye every evening. 2.5 mL 12    [DISCONTINUED] cloNIDine (CATAPRES) 0.1 MG tablet Take 1 tablet (0.1 mg total) by mouth 2 (two) times daily as needed (systolic BP over 180). 20 tablet 1    [DISCONTINUED] isosorbide mononitrate (IMDUR) 30 MG 24 hr tablet Take 1 tablet (30 mg total) by mouth every evening. 90 tablet 1     Current Facility-Administered Medications on File Prior to Visit   Medication Dose Route Frequency Provider Last Rate Last Admin    sodium chloride 0.9% flush 3 mL  3 mL Intravenous PRN Kristene W. Olinde, PA-C         vancomycin in dextrose 5 % 1 gram/250 mL IVPB 1,000 mg  1,000 mg Intravenous On Call Procedure Vianney Fountain NP           Medications have been reviewed and reconciled with patient at visit today.    Barriers to medications present (no )    Fall since last office visit (no )      Exam:  Vitals:    05/19/23 1004   BP: 135/78   Pulse: 88   Temp: 98.2 °F (36.8 °C)     Weight: 81.7 kg (180 lb 3.2 oz)   Body mass index is 30.93 kg/m².      BP Readings from Last 3 Encounters:   05/19/23 135/78   05/09/23 (!) 144/90   04/26/23 120/72     Wt Readings from Last 3 Encounters:   05/19/23 1004 81.7 kg (180 lb 3.2 oz)   05/09/23 1331 79.5 kg (175 lb 4.8 oz)   04/26/23 1305 80.1 kg (176 lb 8 oz)            Physical Exam  Constitutional:       General: She is not in acute distress.     Appearance: She is not ill-appearing.   HENT:      Mouth/Throat:      Mouth: Mucous membranes are moist.      Pharynx: No oropharyngeal exudate or posterior oropharyngeal erythema.   Eyes:      General: No scleral icterus.  Cardiovascular:      Rate and Rhythm: Regular rhythm. Tachycardia present.   Pulmonary:      Effort: Pulmonary effort is normal. No respiratory distress.      Breath sounds: Normal breath sounds.   Abdominal:      General: There is no distension.      Palpations: Abdomen is soft.   Musculoskeletal:         General: Swelling (minimal BLE) present.      Cervical back: Neck supple. No rigidity.   Skin:     General: Skin is warm and dry.   Neurological:      Mental Status: She is alert. Mental status is at baseline.   Psychiatric:         Mood and Affect: Mood normal.         Behavior: Behavior normal.         Thought Content: Thought content normal.       Laboratory Reviewed: (Yes)  Lab Results   Component Value Date    WBC 6.61 04/27/2023    HGB 12.6 04/27/2023    HCT 40.7 04/27/2023     04/27/2023    CHOL 168 08/31/2022    TRIG 98 08/31/2022    HDL 43 08/31/2022    ALT 16 01/12/2023    AST 21 01/12/2023      04/27/2023    K 3.7 04/27/2023    CL 99 04/27/2023    CREATININE 2.1 (H) 04/27/2023    BUN 38 (H) 04/27/2023    CO2 25 04/27/2023    TSH 3.577 04/27/2023    INR 1.1 10/25/2022    HGBA1C 7.0 (H) 03/20/2023           Health Maintenance  Health Maintenance Topics with due status: Not Due       Topic Last Completion Date    Lipid Panel 08/31/2022    Hemoglobin A1c (Prediabetes) 03/20/2023     Health Maintenance Due   Topic Date Due    Shingles Vaccine (1 of 2) Never done    TETANUS VACCINE  11/09/2021    Pneumococcal Vaccines (Age 65+) (2 - PCV) 03/16/2023       Assessment:  Problem List Items Addressed This Visit          Ophtho    COAG (chronic open-angle glaucoma) - Both Eyes     Plans to start timolol. Reports BLE weakness while taking previously.   Followed closely by ophthalmology.               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 She will increase to the recommended 10 mg TID dose and continue to monitor.              Atrial fibrillation with RVR     Discussed importance of increasing beta blocker to recommended dose. She is fearful of not tolerating since starting timolol with higher dose metoprolol. She will start timolol first then plans to increase metoprolol dose.              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 Patient expressed understanding. Patient was given the opportunity to ask questions and be an active participant in their medical care. Patient had no further questions or concerns at  this time.   -Documentation of patient's health and condition was obtained from family member who was present during visit.  -Patient is an overall moderate risk for health complications from their medical conditions.       Follow up: Follow up in about 4 weeks (around 6/16/2023) for BP recheck, Heart Failure.      After visit summary printed and given to patient upon discharge.  Patient goals and care plan are included in After visit summary.    Total medical decision making time was 55 min.  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    Health maintenance needs, recent test results and goals of care discussed with pt and questions answered.

## 2023-05-19 NOTE — PATIENT INSTRUCTIONS
Start timolol as prescribed. Continue to monitor BP readings and heart rate.     Take midodrine 10 mg three times daily as directed.     Check BP after midodrine.     Call me mid next week with BP, weights, symptoms.

## 2023-05-24 ENCOUNTER — TELEPHONE (OUTPATIENT)
Dept: PRIMARY CARE CLINIC | Facility: CLINIC | Age: 88
End: 2023-05-24
Payer: MEDICARE

## 2023-05-24 NOTE — TELEPHONE ENCOUNTER
Feels okay, increased muscle stiffness per pt. Episodes of itching to scalp. Occurs 1 hour after taking midodrine. Sx not becoming worse.     Interested in drinking coffee to raise BP. Discussed risk of increasing HR.     SBP typically 130, DBP 70s. HR 75-80.     Has returned to baseline weight, 170 lbs. No dyspnea. Some mild ankle swelling and swelling to chest is better. Plans to keep appt with Dr Kelly next week.

## 2023-05-24 NOTE — TELEPHONE ENCOUNTER
----- Message from Najma Zhao RN sent at 5/24/2023  2:30 PM CDT -----  Contact: [phone]    ----- Message -----  From: Dilan Chavez  Sent: 5/24/2023   2:06 PM CDT  To: [facility] Staff    Pt called requesting to speak with Heather to update on current condition.

## 2023-05-24 NOTE — TELEPHONE ENCOUNTER
----- Message from Dilan Chavez sent at 5/24/2023  2:05 PM CDT -----  Contact: [phone]  Pt called requesting to speak with Heather to update on current condition.

## 2023-05-26 ENCOUNTER — PATIENT MESSAGE (OUTPATIENT)
Dept: CARDIOLOGY | Facility: CLINIC | Age: 88
End: 2023-05-26
Payer: MEDICARE

## 2023-05-31 ENCOUNTER — OFFICE VISIT (OUTPATIENT)
Dept: CARDIOLOGY | Facility: CLINIC | Age: 88
End: 2023-05-31
Payer: MEDICARE

## 2023-05-31 VITALS
BODY MASS INDEX: 30.56 KG/M2 | HEART RATE: 85 BPM | HEIGHT: 64 IN | SYSTOLIC BLOOD PRESSURE: 142 MMHG | WEIGHT: 179 LBS | DIASTOLIC BLOOD PRESSURE: 84 MMHG | OXYGEN SATURATION: 97 %

## 2023-05-31 DIAGNOSIS — I48.92 ATRIAL FLUTTER WITH RAPID VENTRICULAR RESPONSE: Primary | ICD-10-CM

## 2023-05-31 DIAGNOSIS — I48.19 PERSISTENT ATRIAL FIBRILLATION: ICD-10-CM

## 2023-05-31 PROCEDURE — 1159F PR MEDICATION LIST DOCUMENTED IN MEDICAL RECORD: ICD-10-PCS | Mod: CPTII,S$GLB,, | Performed by: INTERNAL MEDICINE

## 2023-05-31 PROCEDURE — 3288F FALL RISK ASSESSMENT DOCD: CPT | Mod: CPTII,S$GLB,, | Performed by: INTERNAL MEDICINE

## 2023-05-31 PROCEDURE — 1101F PT FALLS ASSESS-DOCD LE1/YR: CPT | Mod: CPTII,S$GLB,, | Performed by: INTERNAL MEDICINE

## 2023-05-31 PROCEDURE — 99999 PR PBB SHADOW E&M-EST. PATIENT-LVL IV: CPT | Mod: PBBFAC,,, | Performed by: INTERNAL MEDICINE

## 2023-05-31 PROCEDURE — 1159F MED LIST DOCD IN RCRD: CPT | Mod: CPTII,S$GLB,, | Performed by: INTERNAL MEDICINE

## 2023-05-31 PROCEDURE — 1101F PR PT FALLS ASSESS DOC 0-1 FALLS W/OUT INJ PAST YR: ICD-10-PCS | Mod: CPTII,S$GLB,, | Performed by: INTERNAL MEDICINE

## 2023-05-31 PROCEDURE — 3288F PR FALLS RISK ASSESSMENT DOCUMENTED: ICD-10-PCS | Mod: CPTII,S$GLB,, | Performed by: INTERNAL MEDICINE

## 2023-05-31 PROCEDURE — 99214 PR OFFICE/OUTPT VISIT, EST, LEVL IV, 30-39 MIN: ICD-10-PCS | Mod: S$GLB,,, | Performed by: INTERNAL MEDICINE

## 2023-05-31 PROCEDURE — 99214 OFFICE O/P EST MOD 30 MIN: CPT | Mod: S$GLB,,, | Performed by: INTERNAL MEDICINE

## 2023-05-31 PROCEDURE — 1126F PR PAIN SEVERITY QUANTIFIED, NO PAIN PRESENT: ICD-10-PCS | Mod: CPTII,S$GLB,, | Performed by: INTERNAL MEDICINE

## 2023-05-31 PROCEDURE — 99999 PR PBB SHADOW E&M-EST. PATIENT-LVL IV: ICD-10-PCS | Mod: PBBFAC,,, | Performed by: INTERNAL MEDICINE

## 2023-05-31 PROCEDURE — 1126F AMNT PAIN NOTED NONE PRSNT: CPT | Mod: CPTII,S$GLB,, | Performed by: INTERNAL MEDICINE

## 2023-05-31 NOTE — PROGRESS NOTES
Subjective:   Patient ID:  Shirley Metz is a 89 y.o. female who presents for follow-up of Atrial Fibrillation      89 yoF referred for AF management.     8/17/22: NSR on recent ECGs up until 6/15/22. AFL and AF on ECGs afterwards starting 7/11/22. Event monitor with 100% AF, controlled average V rate. Normal EF. She had AF after a new BP agent 10/19. Her symptoms are mild. She has tried amiodarone in the past which caused side effects. She has history of CAD and has QTc 450s.     9/22: AF 9/2/22 ECG. She continues to have symptomatic AF as well as symptoms on medication. She wishes to pursue ablation.     2/23: PVI/CTI 11/3/22. She had upper lip swelling after her PVI, suspected to be due to trauma from the STACEY/ET intubations. She had some complaints about the overnight care. She was in NSR on ECGs up until 1/5/23. 1/10/23 she was in atypical AFL. She takes lasix 40 mg qd and sometimes at night. Her AF episodes are few and far between based on her home BP and HR checks. She still complains of SOB. She has a lasix plan provided by the HF clinic.     3/23: Event monitor performed but not ready for assessment. She has continued HF symptoms. She is in AF today    Interval history: AFL on event monitor 4/23 and on holter 5/23. Symptoms improved since her ablation with some minor swelling    Event monitor 4/23:  · The patient complained of 1 episodes. The symptom(s) included shortness of breath. The corresponding rhythm to the patient reported event was atrial fibrillation.  · Patient Reported Event #2 Patient activated. The patient complained of 2 episodes. The corresponding rhythm to the patient reported event was atrial fibrillation. These symptoms were associated with PVCs.  · The total Afib burden was 100%.  · The patient was asymptomatic with atrial fibrillation.    Ablation 11/22:  ·  CTI ablation.  ·  Pulmonary vein isolation.  ·  Intracardiac echo.  ·  3D mapping performed with [employer]    Echo  7/12/22:  · Concentric remodeling and normal systolic function.  · The estimated ejection fraction is 60%.  · Normal left ventricular diastolic function.  · Normal right ventricular size with normal right ventricular systolic function.    Event monitor 7/22:  - Heart rates varied between 41 and 123 BPM with an average of 77 BPM.  - Predominant rhythm: atrial fibrillation   - Atrial Fibrillation (AF) 100.0 %  - PVC 0.48 %    Past Medical History:  11/29/2018: Anemia  11/28/2017: Anxiety  No date: Arthritis  10/9/2019: Atrial fibrillation with RVR  No date: Back pain  03/29/2018: Basal cell carcinoma      Comment:  left mid back  10/15/2019: Chronic diastolic congestive heart failure  8/8/2016: CKD (chronic kidney disease) stage 3, GFR 30-59 ml/min  No date: Colon polyps      Comment:  reported per patient.   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  No date: General anesthetics causing adverse effect in therapeutic use      Comment:  woke up during surgery and gagged on ET tube  No date: Glaucoma  No date: Glaucoma  7/24/2018: Heart failure with preserved left ventricular function   (HFpEF)  No date: Hyperlipidemia  No date: Hypertension  dry: Macular degeneration  No date: Obesity  4/26/2021: PVD (peripheral vascular disease)  4/26/2021: PVD (peripheral vascular disease)  01/08/2019: Squamous cell carcinoma      Comment:  left shoulder  10/5/2021: Stenosis of carotid artery  No date: Trouble in sleeping  2/24/2021: Type 2 diabetes mellitus without complication, without   long-term current use of insulin  No date: Urinary tract infection    Past Surgical History:  11/3/2022: ABLATION OF ARRHYTHMOGENIC FOCUS FOR ATRIAL FIBRILLATION;   N/A      Comment:  Procedure: Ablation atrial fibrillation;  Surgeon:                Toi Kelly MD;  Location: [facility] EP LAB;  Service:               Cardiology;  Laterality: N/A;  afib, PVI/CTI, RFA, STACEY                (cx if SR), DEDE, anes, MB,  3 Prep  1938: ADENOIDECTOMY  No date: BREAST BIOPSY; Left      Comment:  1997. benign  1997 approx: BREAST CYST EXCISION; Left  2012 approx: CARPAL TUNNEL RELEASE; Right  1994: CATARACT EXTRACTION; Bilateral  1975: CHOLECYSTECTOMY  08/2015: CORNEAL TRANSPLANT; Bilateral      Comment:  8/2015 - left; Right 4/2016  No date: EYE SURGERY      Comment:  Fuch's Corneal dystrophy - had 2nd operation with Dr. Quintanilla  No date: EYE SURGERY      Comment:  Cataract wIOL  2011: left thumb; Left      Comment:  removed cuboid for arthritis  8/21/2018: REVERSE TOTAL SHOULDER ARTHROPLASTY; Left      Comment:  Procedure: ARTHROPLASTY, SHOULDER, TOTAL, REVERSE;                 Surgeon: Solomon Lujan MD;  Location: [facility] OR;                 Service: Orthopedics;  Laterality: Left;  WITH BICEP                TENODESIS  2017: SKIN CANCER EXCISION; Left      Comment:  BCC removal by Dr. Valadez  05/11/2011: SLT- OS (aka TRABECULOPLASTY); Left      Comment:  repeat 3/14/12  8/21/2018: TENOTOMY; Left      Comment:  Procedure: TENOTOMY;  Surgeon: Solomon Lujan MD;                 Location: [facility] OR;  Service: Orthopedics;  Laterality:                Left;  1938: TONSILLECTOMY  3/21/2023: TRANSESOPHAGEAL ECHOCARDIOGRAM WITH POSSIBLE CARDIOVERSION   (STACEY W/ POSS CARDIOVERSION); N/A      Comment:  Procedure: Transesophageal echo (STACEY) intra-procedure                log documentation;  Surgeon: Trevon Ramirez MD;  Location:                [facility] CATH LAB;  Service: Cardiology;  Laterality: N/A;  10/10/2019: TREATMENT OF CARDIAC ARRHYTHMIA; N/A      Comment:  Procedure: CARDIOVERSION;  Surgeon: Pete Durán MD;                Location: [facility] CATH LAB;  Service: Cardiology;                 Laterality: N/A;  12/6/2019: TREATMENT OF CARDIAC ARRHYTHMIA; N/A      Comment:  Procedure: CARDIOVERSION;  Surgeon: Samy Castillo MD;                 Location: [facility] CATH LAB;  Service: Cardiology;                 Laterality: N/A;    Social  History    Socioeconomic History      Marital status:       Number of children: 3    Tobacco Use      Smoking status: Never      Smokeless tobacco: Never      Tobacco comments: history of passive smoking from her ex-    Substance and Sexual Activity      Alcohol use: Yes        Alcohol/week: 2.0 standard drinks        Types: 2 Standard drinks or equivalent per week        Comment: occ      Drug use: No      Sexual activity: Not Currently        Partners: Male        Birth control/protection: Post-menopausal        Comment: discussed protection for STD's    Other Topics      Concerns:        Are you pregnant or think you may be?: No        Breast-feeding: No    Social History Narrative       x 2,  x 1. Retired artist - previously doing jewelry/wall pieces; ; lives in [facility]; has 3 sons - 52( lives in [place], 54, and 56;exercises minimally - limited due to back issues. Still drives. Does have a Living Will.    Social Determinants of Health  Physical Activity: Unknown      Days of Exercise per Week: Patient refused      Minutes of Exercise per Session: 10 min  Stress: No Stress Concern Present      Feeling of Stress : Not at all    Review of patient's family history indicates:    Current Outpatient Medications:  acetaminophen (TYLENOL) 500 MG tablet, Take 500 mg by mouth nightly as needed., Disp: , Rfl:   ALPRAZolam (XANAX) 0.5 MG tablet, Take 1 tablet (0.5 mg total) by mouth nightly as needed for Anxiety., Disp: 30 tablet, Rfl: 5  apixaban (ELIQUIS) 2.5 mg Tab, Take 1 tablet (2.5 mg total) by mouth 2 (two) times daily., Disp: 180 tablet, Rfl: 3  b complex vitamins capsule, Take 1 capsule by mouth once daily., Disp: , Rfl:   brinzolamide (AZOPT) 1 % ophthalmic suspension, Place 1 drop into the left eye every morning., Disp: 2 mL, Rfl: 0  cholecalciferol, vitamin D3, (VITAMIN D3) 50 mcg (2,000 unit) Cap, Take 1 capsule by mouth once daily., Disp: , Rfl:    coenzyme Q10 200 mg capsule, Take 200 mg by mouth once daily., Disp: , Rfl:    fish oil-omega-3 fatty acids 300-1,000 mg capsule, Take 1 capsule by mouth once daily., Disp: , Rfl:   furosemide (LASIX) 20 MG tablet, Take 1 tablet (20 mg total) by mouth 2 (two) times a day., Disp: 180 tablet, Rfl: 1  metoprolol tartrate (LOPRESSOR) 50 MG tablet, Take 1 tablet (50 mg total) by mouth 2 (two) times daily., Disp: 60 tablet, Rfl: 3  midodrine (PROAMATINE) 10 MG tablet, Take 1 tablet (10 mg total) by mouth 3 (three) times daily as needed (for BP < 100/70)., Disp: 180 tablet, Rfl: 1  POLYETHYLENE GLYCOL 3350 (MIRALAX ORAL), Take 17 g by mouth 2 (two) times a day. Taking BID, Disp: , Rfl:   RHOPRESSA 0.02 % ophthalmic solution, PLACE 1 DROP INTO THE LEFT EYE EVERY EVENING., Disp: 2.5 mL, Rfl: 11  sars-cov-2, covid-19 pfizer omicron, (PFIZER COVID BIVAL,12Y UP,,PF,) 30 mcg/0.3 mL injection, Inject into the muscle., Disp: 0.3 mL, Rfl: 0  timolol maleate 0.5% (TIMOPTIC) 0.5 % Drop, Place 1 drop into the left eye every morning., Disp: 5 mL, Rfl: 12  TRAVATAN Z 0.004 % ophthalmic solution, Place 1 drop into the left eye every evening., Disp: 2.5 mL, Rfl: 12    No current facility-administered medications for this visit.  Facility-Administered Medications Ordered in Other Visits:  sodium chloride 0.9% flush 3 mL, 3 mL, Intravenous, PRN, Steve Galarza PA-C  vancomycin in dextrose 5 % 1 gram/250 mL IVPB 1,000 mg, 1,000 mg, Intravenous, On Call Procedure, Vianney Fountain NP        Review of Systems   Constitutional: Negative. Negative for diaphoresis, malaise/fatigue and weight gain.   HENT: Negative.  Negative for hoarse voice.    Eyes: Negative.  Negative for double vision and visual disturbance.   Cardiovascular:  Negative for chest pain, claudication, cyanosis, dyspnea on exertion, irregular heartbeat, leg swelling, near-syncope, orthopnea, palpitations, paroxysmal nocturnal dyspnea and syncope.   Respiratory:  Negative.  Negative for cough, shortness of breath and snoring.    Endocrine: Negative.    Hematologic/Lymphatic: Negative.  Negative for bleeding problem. Does not bruise/bleed easily.   Skin: Negative.  Negative for color change and poor wound healing.   Musculoskeletal: Negative.  Negative for muscle cramps, muscle weakness and myalgias.   Gastrointestinal: Negative.  Negative for bloating, abdominal pain, change in bowel habit, diarrhea, heartburn, hematemesis, hematochezia, melena and nausea.   Genitourinary: Negative.    Neurological: Negative.  Negative for excessive daytime sleepiness, dizziness, headaches, light-headedness, loss of balance, numbness and weakness.        HAS HOTTNESS IN BOTH FEET AND ARMS AND HANDS.    Psychiatric/Behavioral: Negative.  Negative for memory loss. The patient does not have insomnia.    Allergic/Immunologic: Negative.  Negative for hives.     Objective:   Physical Exam  Vitals and nursing note reviewed.   Constitutional:       General: She is not in acute distress.     Appearance: She is well-developed. She is not diaphoretic.   HENT:      Head: Normocephalic and atraumatic.      Nose: Nose normal.   Eyes:      General: No scleral icterus.     Conjunctiva/sclera: Conjunctivae normal.   Neck:      Thyroid: No thyromegaly.      Vascular: No JVD.   Cardiovascular:      Rate and Rhythm: Normal rate. Rhythm irregular.      Heart sounds: S1 normal and S2 normal. No murmur heard.    No friction rub. No gallop. No S3 or S4 sounds.   Pulmonary:      Effort: Pulmonary effort is normal. No respiratory distress.      Breath sounds: Normal breath sounds. No stridor. No wheezing or rales.   Chest:      Chest wall: No tenderness.   Abdominal:      General: Bowel sounds are normal. There is no distension.      Palpations: Abdomen is soft. There is no mass.      Tenderness: There is no abdominal tenderness. There is no rebound.   Genitourinary:     Comments: Deferred  Musculoskeletal:          General: No tenderness or deformity. Normal range of motion.      Cervical back: Normal range of motion and neck supple.   Lymphadenopathy:      Cervical: No cervical adenopathy.   Skin:     General: Skin is warm and dry.      Coloration: Skin is not pale.      Findings: No erythema or rash.   Neurological:      Mental Status: She is alert and oriented to person, place, and time.      Motor: No abnormal muscle tone.      Coordination: Coordination normal.   Psychiatric:         Behavior: Behavior normal.         Thought Content: Thought content normal.         Judgment: Judgment normal.       Assessment:      1. Atrial flutter with rapid ventricular response    2. Persistent atrial fibrillation        Plan:   89 yoF s/p PVI with recurrent atypical AFL. She is less symptomatic in this rhythm than AF. She questions if she has even been in it. Would adopt rate control strategy for this rhythm. If symptoms worsen, would consider repeat ablation. RTc 6m

## 2023-06-02 ENCOUNTER — TELEPHONE (OUTPATIENT)
Dept: PRIMARY CARE CLINIC | Facility: CLINIC | Age: 88
End: 2023-06-02

## 2023-06-02 ENCOUNTER — TELEPHONE (OUTPATIENT)
Dept: CARDIOLOGY | Facility: CLINIC | Age: 88
End: 2023-06-02
Payer: MEDICARE

## 2023-06-02 ENCOUNTER — OFFICE VISIT (OUTPATIENT)
Dept: PRIMARY CARE CLINIC | Facility: CLINIC | Age: 88
End: 2023-06-02
Payer: MEDICARE

## 2023-06-02 VITALS
HEIGHT: 64 IN | TEMPERATURE: 98 F | BODY MASS INDEX: 30.22 KG/M2 | HEART RATE: 89 BPM | DIASTOLIC BLOOD PRESSURE: 58 MMHG | OXYGEN SATURATION: 95 % | SYSTOLIC BLOOD PRESSURE: 114 MMHG | WEIGHT: 177 LBS

## 2023-06-02 DIAGNOSIS — I48.92 ATRIAL FLUTTER WITH RAPID VENTRICULAR RESPONSE: ICD-10-CM

## 2023-06-02 DIAGNOSIS — I12.9 CKD STAGE 4 SECONDARY TO HYPERTENSION: ICD-10-CM

## 2023-06-02 DIAGNOSIS — N18.4 CKD STAGE 4 SECONDARY TO HYPERTENSION: ICD-10-CM

## 2023-06-02 DIAGNOSIS — G62.9 NEUROPATHY: ICD-10-CM

## 2023-06-02 DIAGNOSIS — J84.10 PULMONARY GRANULOMA: ICD-10-CM

## 2023-06-02 DIAGNOSIS — E78.2 MIXED HYPERLIPIDEMIA: ICD-10-CM

## 2023-06-02 DIAGNOSIS — N18.4 TYPE 2 DIABETES MELLITUS WITH STAGE 4 CHRONIC KIDNEY DISEASE, WITHOUT LONG-TERM CURRENT USE OF INSULIN: ICD-10-CM

## 2023-06-02 DIAGNOSIS — E53.8 B12 DEFICIENCY: ICD-10-CM

## 2023-06-02 DIAGNOSIS — R91.8 PULMONARY NODULES/LESIONS, MULTIPLE: ICD-10-CM

## 2023-06-02 DIAGNOSIS — H40.1131 CHRONIC OPEN ANGLE GLAUCOMA OF BOTH EYES, MILD STAGE: ICD-10-CM

## 2023-06-02 DIAGNOSIS — F41.9 ANXIETY: ICD-10-CM

## 2023-06-02 DIAGNOSIS — Z00.00 ENCOUNTER FOR PREVENTIVE HEALTH EXAMINATION: Primary | ICD-10-CM

## 2023-06-02 DIAGNOSIS — E21.3 HYPERPARATHYROIDISM: ICD-10-CM

## 2023-06-02 DIAGNOSIS — I13.2 HYPERTENSIVE HEART AND RENAL DISEASE WITH BOTH (CONGESTIVE) HEART FAILURE AND RENAL FAILURE: ICD-10-CM

## 2023-06-02 DIAGNOSIS — Z79.01 CHRONIC ANTICOAGULATION: ICD-10-CM

## 2023-06-02 DIAGNOSIS — E11.22 TYPE 2 DIABETES MELLITUS WITH STAGE 4 CHRONIC KIDNEY DISEASE, WITHOUT LONG-TERM CURRENT USE OF INSULIN: ICD-10-CM

## 2023-06-02 DIAGNOSIS — N19 HYPERTENSIVE HEART AND RENAL DISEASE WITH BOTH (CONGESTIVE) HEART FAILURE AND RENAL FAILURE: ICD-10-CM

## 2023-06-02 PROBLEM — E11.29 TYPE 2 DIABETES MELLITUS WITH KIDNEY COMPLICATION, WITHOUT LONG-TERM CURRENT USE OF INSULIN: Status: ACTIVE | Noted: 2021-02-24

## 2023-06-02 PROBLEM — D64.9 ANEMIA: Status: RESOLVED | Noted: 2018-11-29 | Resolved: 2023-06-02

## 2023-06-02 PROBLEM — R73.03 PRE-DIABETES: Status: RESOLVED | Noted: 2017-12-04 | Resolved: 2023-06-02

## 2023-06-02 PROCEDURE — 1101F PR PT FALLS ASSESS DOC 0-1 FALLS W/OUT INJ PAST YR: ICD-10-PCS | Mod: CPTII,S$GLB,, | Performed by: NURSE PRACTITIONER

## 2023-06-02 PROCEDURE — 99999 PR PBB SHADOW E&M-EST. PATIENT-LVL V: CPT | Mod: PBBFAC,,, | Performed by: NURSE PRACTITIONER

## 2023-06-02 PROCEDURE — 3288F PR FALLS RISK ASSESSMENT DOCUMENTED: ICD-10-PCS | Mod: CPTII,S$GLB,, | Performed by: NURSE PRACTITIONER

## 2023-06-02 PROCEDURE — G0439 PPPS, SUBSEQ VISIT: HCPCS | Mod: S$GLB,,, | Performed by: NURSE PRACTITIONER

## 2023-06-02 PROCEDURE — 1159F PR MEDICATION LIST DOCUMENTED IN MEDICAL RECORD: ICD-10-PCS | Mod: CPTII,S$GLB,, | Performed by: NURSE PRACTITIONER

## 2023-06-02 PROCEDURE — 1170F PR FUNCTIONAL STATUS ASSESSED: ICD-10-PCS | Mod: CPTII,S$GLB,, | Performed by: NURSE PRACTITIONER

## 2023-06-02 PROCEDURE — 1170F FXNL STATUS ASSESSED: CPT | Mod: CPTII,S$GLB,, | Performed by: NURSE PRACTITIONER

## 2023-06-02 PROCEDURE — G0439 PR MEDICARE ANNUAL WELLNESS SUBSEQUENT VISIT: ICD-10-PCS | Mod: S$GLB,,, | Performed by: NURSE PRACTITIONER

## 2023-06-02 PROCEDURE — 1101F PT FALLS ASSESS-DOCD LE1/YR: CPT | Mod: CPTII,S$GLB,, | Performed by: NURSE PRACTITIONER

## 2023-06-02 PROCEDURE — 3288F FALL RISK ASSESSMENT DOCD: CPT | Mod: CPTII,S$GLB,, | Performed by: NURSE PRACTITIONER

## 2023-06-02 PROCEDURE — 99999 PR PBB SHADOW E&M-EST. PATIENT-LVL V: ICD-10-PCS | Mod: PBBFAC,,, | Performed by: NURSE PRACTITIONER

## 2023-06-02 PROCEDURE — 1159F MED LIST DOCD IN RCRD: CPT | Mod: CPTII,S$GLB,, | Performed by: NURSE PRACTITIONER

## 2023-06-02 RX ORDER — ALPRAZOLAM 0.5 MG/1
0.5 TABLET ORAL NIGHTLY PRN
Qty: 30 TABLET | Refills: 5 | Status: SHIPPED | OUTPATIENT
Start: 2023-06-02 | End: 2023-12-11

## 2023-06-02 NOTE — PROGRESS NOTES
"  Shirley Metz presented for a follow-up Medicare AWV today. The following components were reviewed and updated:    Medical history  Family History  Social history  Allergies and Current Medications  Health Risk Assessment  Health Maintenance  Care Team    **See Completed Assessments for Annual Wellness visit with in the encounter summary    The following assessments were completed:  Depression Screening  Cognitive function Screening  Timed Get Up Test  Whisper Test  Nutrition Screening    Vitals:    06/02/23 0952 06/02/23 1032 06/02/23 1037   BP: (!) 122/58 120/82 (!) 114/58   BP Location:  Right arm Right arm   Patient Position:  Sitting Sitting   Pulse: 89     Temp: 98 °F (36.7 °C)     TempSrc: Oral     SpO2: 95%     Weight: 80.3 kg (177 lb)     Height: 5' 4" (1.626 m)       Body mass index is 30.38 kg/m².     Feels better overall. Eye pressure is down to 20. Considering procedure for glaucoma.   Using timolol "sparingly," no problems since starting timolol.     She has cut back on midodrine. SBP typically 130s at home. Still concerned about HR. It has been 70s-80s.       5/31/23: Cardiology - 89 yoF s/p PVI with recurrent atypical AFL. She is less symptomatic in this rhythm than AF. She questions if she has even been in it. Would adopt rate control strategy for this rhythm. If symptoms worsen, would consider repeat ablation. RTC 6m    Home BP cuff - diastolic 22 points higher than manual reading.       Uneven area to tailbone. Uncomfortable when sitting. Onset one year ago, becoming a little worse.    Advance Care Planning     Date: 06/02/2023  Has living will at home. She will bring a copy to next appt. Her son and daughter-in-law are HC POA. She will bring these documents also.            Review of Systems   Constitutional:  Negative for activity change and appetite change.   Eyes:  Negative for pain and visual disturbance.   Respiratory:  Negative for cough, shortness of breath and wheezing.  "   Cardiovascular:  Negative for chest pain, palpitations and leg swelling.   Gastrointestinal:  Negative for abdominal pain, constipation, diarrhea and nausea.   Genitourinary:  Negative for dysuria.   Musculoskeletal:  Negative for arthralgias and gait problem.     Physical Exam  Constitutional:       General: She is not in acute distress.     Appearance: She is not ill-appearing.   HENT:      Right Ear: Tympanic membrane normal.      Left Ear: Tympanic membrane normal.      Mouth/Throat:      Mouth: Mucous membranes are moist.      Pharynx: No oropharyngeal exudate or posterior oropharyngeal erythema.   Eyes:      General: No scleral icterus.     Extraocular Movements: Extraocular movements intact.      Conjunctiva/sclera: Conjunctivae normal.      Pupils: Pupils are equal, round, and reactive to light.   Cardiovascular:      Rate and Rhythm: Normal rate. Rhythm irregular.   Pulmonary:      Effort: Pulmonary effort is normal.      Breath sounds: Normal breath sounds.   Abdominal:      General: There is no distension.      Palpations: Abdomen is soft.      Tenderness: There is no abdominal tenderness.   Musculoskeletal:         General: No swelling, tenderness, deformity or signs of injury.      Cervical back: Neck supple. No tenderness.   Lymphadenopathy:      Cervical: No cervical adenopathy.   Skin:     General: Skin is warm and dry.   Neurological:      General: No focal deficit present.      Mental Status: She is alert. Mental status is at baseline.      Motor: No weakness.   Psychiatric:         Mood and Affect: Mood normal.         Behavior: Behavior normal.         Thought Content: Thought content normal.          Health Maintenance         Date Due Completion Date    Shingles Vaccine (1 of 2) Never done ---    TETANUS VACCINE 11/09/2021 11/9/2011    Pneumococcal Vaccines (Age 65+) (2 - PCV) 03/16/2023 3/16/2022    Lipid Panel 08/31/2023 8/31/2022    Hemoglobin A1c (Prediabetes) 03/20/2024 3/20/2023               PROBLEM LIST ITEMS ADDRESSED THIS VISIT:    Problem List Items Addressed This Visit          Neuro    Neuropathy     Conservative mgmt for now.             Psychiatric    Anxiety    Relevant Medications    ALPRAZolam (XANAX) 0.5 MG tablet       Ophtho    COAG (chronic open-angle glaucoma) - Both Eyes     Followed closely by ophthalmology.            Pulmonary    Pulmonary nodules/lesions, multiple     No further sx. Monitor         Pulmonary granuloma     No respiratory sx. Monitor.            Cardiac/Vascular    Mixed hyperlipidemia     Manages with diet and OTC supplements.   Declines statin.         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 No acute sx.            Atrial flutter with rapid ventricular response     Rate is controlled. Continues Eliquis, metoprolol            Renal/    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     Followed by nephrology.             Hematology    Chronic anticoagulation     Continues Eliquis. No sx of bleeding.             Endocrine    Type 2 diabetes mellitus with kidney complication, without long-term current use of insulin     Lab Results   Component Value Date    HGBA1C 7.0 (H) 03/20/2023   Diet controlled. Monitor.          Hyperparathyroidism     Lab Results   Component Value Date    .2 (H) 04/27/2023    CALCIUM 10.1 04/27/2023    PHOS 5.1 (H) 04/27/2023    PHOS 5.1 (H) 04/27/2023   Monitor.            B12 deficiency     Lab Results   Component Value Date    [id number] 04/27/2023   Monitor.             Other Visit Diagnoses       Encounter for preventive health examination    -  Primary              Provided Shirley with a 5-10 year written screening schedule and personal prevention plan. Recommendations were developed using the USPSTF age appropriate recommendations. Education, counseling, and referrals were provided as needed.  After Visit Summary printed and given to  patient which includes a list of additional screenings\tests needed.        JAIRON Bonner, FNP-C  [facility] [address] [place] [address]  I offered to discuss advanced care planning, including how to pick a person who would make decisions for you if you were unable to make them for yourself, called a health care power of , and what kind of decisions you might make such as use of life sustaining treatments such as ventilators and tube feeding when faced with a life limiting illness recorded on a living will that they will need to know. (How you want to be cared for as you near the end of your natural life)     X  Patient has advanced directives written and agrees to provide copies to the institution.

## 2023-06-02 NOTE — PATIENT INSTRUCTIONS
Counseling and Referral of Other Preventative  (Italic type indicates deductible and co-insurance are waived)    Patient Name: Shirley Metz  Today's Date: 6/2/2023    Health Maintenance       Date Due Completion Date    Shingles Vaccine (1 of 2) Never done ---    TETANUS VACCINE 11/09/2021 11/9/2011    Pneumococcal Vaccines (Age 65+) (2 - PCV) 03/16/2023 3/16/2022    Lipid Panel 08/31/2023 8/31/2022    Hemoglobin A1c (Prediabetes) 03/20/2024 3/20/2023        No orders of the defined types were placed in this encounter.    The following information is provided to all patients.  This information is to help you find resources for any of the problems found today that may be affecting your health:                Living healthy guide: www.[facility].[place].gov      Understanding Diabetes: www.diabetes.org      Eating healthy: www.cdc.gov/healthyweight      CDC home safety checklist: www.cdc.gov/steadi/patient.html      Agency on Aging: www.goea.[place].[facility]      Alcoholics anonymous (AA): www.aa.org      Physical Activity: www.andrew.nih.gov/sa0pplr      Tobacco use: www.quitwithusla.org

## 2023-06-02 NOTE — ASSESSMENT & PLAN NOTE
Lab Results   Component Value Date    .2 (H) 04/27/2023    CALCIUM 10.1 04/27/2023    PHOS 5.1 (H) 04/27/2023    PHOS 5.1 (H) 04/27/2023   Monitor.

## 2023-06-02 NOTE — TELEPHONE ENCOUNTER
Hi. Ms. Bettencourt is wanting to schedule to be seen with Dr. Ramirez. She is a established patient with him. Either Monday, Wednesday or Friday she says she can be seen in the morning  at [facility]. She can be seen on Tuesday & or Thursday at [facility] in the morning. Please reach out to the patient with her new appt date and time.         Thanks,  Sofia

## 2023-06-12 ENCOUNTER — PATIENT MESSAGE (OUTPATIENT)
Dept: OPHTHALMOLOGY | Facility: CLINIC | Age: 88
End: 2023-06-12
Payer: MEDICARE

## 2023-06-13 DIAGNOSIS — H40.1131 PRIMARY OPEN ANGLE GLAUCOMA (POAG) OF BOTH EYES, MILD STAGE: ICD-10-CM

## 2023-06-13 RX ORDER — BRINZOLAMIDE 10 MG/ML
1 SUSPENSION/ DROPS OPHTHALMIC 2 TIMES DAILY
Qty: 5 ML | Refills: 3 | Status: SHIPPED | OUTPATIENT
Start: 2023-06-13 | End: 2023-11-22 | Stop reason: SDUPTHER

## 2023-06-28 ENCOUNTER — OFFICE VISIT (OUTPATIENT)
Dept: CARDIOLOGY | Facility: CLINIC | Age: 88
End: 2023-06-28
Payer: MEDICARE

## 2023-06-28 VITALS
DIASTOLIC BLOOD PRESSURE: 62 MMHG | BODY MASS INDEX: 30.3 KG/M2 | SYSTOLIC BLOOD PRESSURE: 110 MMHG | OXYGEN SATURATION: 95 % | HEIGHT: 64 IN | WEIGHT: 177.5 LBS

## 2023-06-28 DIAGNOSIS — I48.19 PERSISTENT ATRIAL FIBRILLATION: ICD-10-CM

## 2023-06-28 DIAGNOSIS — Z98.890 S/P ABLATION OF ATRIAL FIBRILLATION: ICD-10-CM

## 2023-06-28 DIAGNOSIS — I48.3 TYPICAL ATRIAL FLUTTER: ICD-10-CM

## 2023-06-28 DIAGNOSIS — I10 PRIMARY HYPERTENSION: ICD-10-CM

## 2023-06-28 DIAGNOSIS — I65.23 CAROTID ARTERY CALCIFICATION, BILATERAL: ICD-10-CM

## 2023-06-28 DIAGNOSIS — I70.0 CALCIFICATION OF AORTA: ICD-10-CM

## 2023-06-28 DIAGNOSIS — N18.4 CKD STAGE 4 SECONDARY TO HYPERTENSION: ICD-10-CM

## 2023-06-28 DIAGNOSIS — I11.9 HYPERTENSIVE LEFT VENTRICULAR HYPERTROPHY, WITHOUT HEART FAILURE: ICD-10-CM

## 2023-06-28 DIAGNOSIS — I49.3 PVC'S (PREMATURE VENTRICULAR CONTRACTIONS): ICD-10-CM

## 2023-06-28 DIAGNOSIS — I48.92 ATRIAL FLUTTER WITH RAPID VENTRICULAR RESPONSE: Primary | ICD-10-CM

## 2023-06-28 DIAGNOSIS — Z86.79 S/P ABLATION OF ATRIAL FIBRILLATION: ICD-10-CM

## 2023-06-28 DIAGNOSIS — I12.9 CKD STAGE 4 SECONDARY TO HYPERTENSION: ICD-10-CM

## 2023-06-28 DIAGNOSIS — I48.0 PAF (PAROXYSMAL ATRIAL FIBRILLATION): Chronic | ICD-10-CM

## 2023-06-28 DIAGNOSIS — I50.32 CHRONIC HEART FAILURE WITH PRESERVED EJECTION FRACTION: ICD-10-CM

## 2023-06-28 DIAGNOSIS — I95.9 HYPOTENSIVE EPISODE: ICD-10-CM

## 2023-06-28 DIAGNOSIS — I73.9 PAD (PERIPHERAL ARTERY DISEASE): ICD-10-CM

## 2023-06-28 PROCEDURE — 1126F PR PAIN SEVERITY QUANTIFIED, NO PAIN PRESENT: ICD-10-PCS | Mod: CPTII,S$GLB,, | Performed by: INTERNAL MEDICINE

## 2023-06-28 PROCEDURE — 3288F FALL RISK ASSESSMENT DOCD: CPT | Mod: CPTII,S$GLB,, | Performed by: INTERNAL MEDICINE

## 2023-06-28 PROCEDURE — 99999 PR PBB SHADOW E&M-EST. PATIENT-LVL IV: CPT | Mod: PBBFAC,,, | Performed by: INTERNAL MEDICINE

## 2023-06-28 PROCEDURE — 99214 PR OFFICE/OUTPT VISIT, EST, LEVL IV, 30-39 MIN: ICD-10-PCS | Mod: S$GLB,,, | Performed by: INTERNAL MEDICINE

## 2023-06-28 PROCEDURE — 1101F PR PT FALLS ASSESS DOC 0-1 FALLS W/OUT INJ PAST YR: ICD-10-PCS | Mod: CPTII,S$GLB,, | Performed by: INTERNAL MEDICINE

## 2023-06-28 PROCEDURE — 99214 OFFICE O/P EST MOD 30 MIN: CPT | Mod: S$GLB,,, | Performed by: INTERNAL MEDICINE

## 2023-06-28 PROCEDURE — 1160F PR REVIEW ALL MEDS BY PRESCRIBER/CLIN PHARMACIST DOCUMENTED: ICD-10-PCS | Mod: CPTII,S$GLB,, | Performed by: INTERNAL MEDICINE

## 2023-06-28 PROCEDURE — 1160F RVW MEDS BY RX/DR IN RCRD: CPT | Mod: CPTII,S$GLB,, | Performed by: INTERNAL MEDICINE

## 2023-06-28 PROCEDURE — 1126F AMNT PAIN NOTED NONE PRSNT: CPT | Mod: CPTII,S$GLB,, | Performed by: INTERNAL MEDICINE

## 2023-06-28 PROCEDURE — 99999 PR PBB SHADOW E&M-EST. PATIENT-LVL IV: ICD-10-PCS | Mod: PBBFAC,,, | Performed by: INTERNAL MEDICINE

## 2023-06-28 PROCEDURE — 1159F PR MEDICATION LIST DOCUMENTED IN MEDICAL RECORD: ICD-10-PCS | Mod: CPTII,S$GLB,, | Performed by: INTERNAL MEDICINE

## 2023-06-28 PROCEDURE — 3288F PR FALLS RISK ASSESSMENT DOCUMENTED: ICD-10-PCS | Mod: CPTII,S$GLB,, | Performed by: INTERNAL MEDICINE

## 2023-06-28 PROCEDURE — 1159F MED LIST DOCD IN RCRD: CPT | Mod: CPTII,S$GLB,, | Performed by: INTERNAL MEDICINE

## 2023-06-28 PROCEDURE — 1101F PT FALLS ASSESS-DOCD LE1/YR: CPT | Mod: CPTII,S$GLB,, | Performed by: INTERNAL MEDICINE

## 2023-06-28 RX ORDER — MIDODRINE HYDROCHLORIDE 10 MG/1
10 TABLET ORAL 3 TIMES DAILY PRN
Qty: 180 TABLET | Refills: 1 | Status: SHIPPED | OUTPATIENT
Start: 2023-06-28 | End: 2023-07-03

## 2023-06-28 RX ORDER — METOPROLOL TARTRATE 25 MG/1
37.5 TABLET, FILM COATED ORAL 2 TIMES DAILY
Qty: 270 TABLET | Refills: 1 | Status: SHIPPED | OUTPATIENT
Start: 2023-06-28 | End: 2023-07-03
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
## 2023-07-03 ENCOUNTER — OFFICE VISIT (OUTPATIENT)
Dept: PRIMARY CARE CLINIC | Facility: CLINIC | Age: 88
End: 2023-07-03
Payer: MEDICARE

## 2023-07-03 VITALS
TEMPERATURE: 98 F | SYSTOLIC BLOOD PRESSURE: 118 MMHG | WEIGHT: 177.38 LBS | RESPIRATION RATE: 16 BRPM | BODY MASS INDEX: 30.45 KG/M2 | HEART RATE: 116 BPM | DIASTOLIC BLOOD PRESSURE: 60 MMHG

## 2023-07-03 DIAGNOSIS — I48.0 PAF (PAROXYSMAL ATRIAL FIBRILLATION): Chronic | ICD-10-CM

## 2023-07-03 DIAGNOSIS — I48.91 ATRIAL FIBRILLATION WITH RVR: ICD-10-CM

## 2023-07-03 DIAGNOSIS — I95.9 HYPOTENSION, UNSPECIFIED HYPOTENSION TYPE: Primary | ICD-10-CM

## 2023-07-03 PROCEDURE — 99999 PR PBB SHADOW E&M-EST. PATIENT-LVL III: CPT | Mod: PBBFAC,,, | Performed by: NURSE PRACTITIONER

## 2023-07-03 PROCEDURE — 99215 OFFICE O/P EST HI 40 MIN: CPT | Mod: S$GLB,,, | Performed by: NURSE PRACTITIONER

## 2023-07-03 PROCEDURE — 99999 PR PBB SHADOW E&M-EST. PATIENT-LVL III: ICD-10-PCS | Mod: PBBFAC,,, | Performed by: NURSE PRACTITIONER

## 2023-07-03 PROCEDURE — 99215 PR OFFICE/OUTPT VISIT, EST, LEVL V, 40-54 MIN: ICD-10-PCS | Mod: S$GLB,,, | Performed by: NURSE PRACTITIONER

## 2023-07-03 RX ORDER — MIDODRINE HYDROCHLORIDE 5 MG/1
TABLET ORAL
Qty: 90 TABLET | Refills: 11 | Status: SHIPPED | OUTPATIENT
Start: 2023-07-03 | End: 2023-10-04

## 2023-07-03 RX ORDER — METOPROLOL TARTRATE 25 MG/1
50 TABLET, FILM COATED ORAL 2 TIMES DAILY
Qty: 360 TABLET | Refills: 3 | Status: SHIPPED | OUTPATIENT
Start: 2023-07-03 | End: 2023-07-18

## 2023-07-03 NOTE — PATIENT INSTRUCTIONS
Start midodrine 10 mg 3 times daily.     Start metoprolol 50 mg twice daily for heart rate control.     Check vital signs at least 3 times daily. Message or call me Wednesday with vital signs.

## 2023-07-03 NOTE — ASSESSMENT & PLAN NOTE
Rate uncontrolled. Increase metoprolol to 50 mg BID. Take midodrine 10 mg TID as prescribed.   Check VS BID. Call me Wednesday with HR, BP readings.

## 2023-07-03 NOTE — PROGRESS NOTES
Shirley Metz  07/03/2023  [id number]    Phylicia Deleon MD  Patient Care Team:  Phylicia Deleon MD as PCP - General (Internal Medicine)  Wilbert Ware MD as Consulting Physician (Ophthalmology)  Rajinder Quintanilla MD as Consulting Physician (Ophthalmology)  Suzan Gregorio PA-C as Physician Assistant (Rheumatology)  Rosario Valadez MD as Consulting Physician (Dermatology)  Trevon Ramirez MD as Consulting Physician (Cardiology)  Amish Mendoza MD (Pulmonary Disease)  Jaquan Choi MD as Consulting Physician (Vascular Surgery)  Debbie Choi MD (Inactive) as Consulting Physician (Gynecology)  Emmanuel Fish MD as Consulting Physician (Hand Surgery)  PAXTON Chaidez Jr., MD as Consulting Physician (Orthopedic Surgery)  Phylicia Deleon MD as Physician (Internal Medicine)  Heather Miller NP as Nurse Practitioner (Family Medicine)      [facility]      Chief Complaint:  No chief complaint on file.      History of Present Illness:  HPI     BP issues.   Metoprolol dose changed to 37.5 mg BID by cardiology 6/28/23. Recommended increasing midodrine dose. PAF-cont rate control and f/u with Dr. Kelly may need repeat Ablation.     Concerned about new midodrine rx. Difficulty swallowing rx, dissolves prior to swallowing. Concerned it is causing scalp to itch. Constant, worse 20 minutes after taking midodrine.     Has a small BM with each urination episode. Lots of flatulence. Getting adequate fiber in diet.     No further dyspnea. Some palpitations this AM and last night. States palpitations do not always correlate with fast HR. HR this , last night 114. No edema. Occasional cramping BLE relieved by topicals.     New formula of midodrine causing GI upset. Difficulty swallowing midodrine 10 mg tablets. Cannot break in half - they disintegrate. Will change to 5 mg, two tablets TID to aid with swallowing/tolerance.        Review of Systems   Constitutional:  Negative  for activity change, appetite change and fatigue.   HENT:  Negative for congestion.    Respiratory:  Negative for cough and shortness of breath.    Cardiovascular:  Positive for palpitations. Negative for chest pain and leg swelling.   Genitourinary:  Negative for difficulty urinating.   Neurological:  Negative for dizziness.       The following were reviewed: Active problem list, medication list, allergies, family history, social history, and Health Maintenance.       Medications:  Current Outpatient Medications on File Prior to Visit   Medication Sig Dispense Refill    acetaminophen (TYLENOL) 500 MG tablet Take 500 mg by mouth nightly as needed.      ALPRAZolam (XANAX) 0.5 MG tablet Take 1 tablet (0.5 mg total) by mouth nightly as needed for Anxiety. 30 tablet 5    apixaban (ELIQUIS) 2.5 mg Tab Take 1 tablet (2.5 mg total) by mouth 2 (two) times daily. 180 tablet 3    b complex vitamins capsule Take 1 capsule by mouth once daily.      brinzolamide (AZOPT) 1 % ophthalmic suspension Place 1 drop into the left eye 2 (two) times a day. 5 mL 3    cholecalciferol, vitamin D3, (VITAMIN D3) 50 mcg (2,000 unit) Cap Take 1 capsule by mouth once daily.      coenzyme Q10 200 mg capsule Take 200 mg by mouth once daily.      fish oil-omega-3 fatty acids 300-1,000 mg capsule Take 1 capsule by mouth once daily.      furosemide (LASIX) 20 MG tablet Take 1 tablet (20 mg total) by mouth 2 (two) times a day. 180 tablet 1    POLYETHYLENE GLYCOL 3350 (MIRALAX ORAL) Take 17 g by mouth 2 (two) times a day. Taking BID      RHOPRESSA 0.02 % ophthalmic solution PLACE 1 DROP INTO THE LEFT EYE EVERY EVENING. 2.5 mL 11    sars-cov-2, covid-19 pfizer omicron, (PFIZER COVID BIVAL,12Y UP,,PF,) 30 mcg/0.3 mL injection Inject into the muscle. 0.3 mL 0    TRAVATAN Z 0.004 % ophthalmic solution Place 1 drop into the left eye every evening. 2.5 mL 12    [DISCONTINUED] cloNIDine (CATAPRES) 0.1 MG tablet Take 1 tablet (0.1 mg total) by mouth 2 (two)  times daily as needed (systolic BP over 180). 20 tablet 1    [DISCONTINUED] isosorbide mononitrate (IMDUR) 30 MG 24 hr tablet Take 1 tablet (30 mg total) by mouth every evening. 90 tablet 1    [DISCONTINUED] metoprolol tartrate (LOPRESSOR) 25 MG tablet Take 1.5 tablets (37.5 mg total) by mouth 2 (two) times daily. 270 tablet 1    [DISCONTINUED] midodrine (PROAMATINE) 10 MG tablet Take 1 tablet (10 mg total) by mouth 3 (three) times daily as needed (for BP < 100/70). 180 tablet 1     Current Facility-Administered Medications on File Prior to Visit   Medication Dose Route Frequency Provider Last Rate Last Admin    sodium chloride 0.9% flush 3 mL  3 mL Intravenous PRN Steve Galarza PA-C        vancomycin in dextrose 5 % 1 gram/250 mL IVPB 1,000 mg  1,000 mg Intravenous On Call Procedure Vianney Fountain NP           Medications have been reviewed and reconciled with patient at visit today.    Barriers to medications present (no )    Fall since last office visit (no )      Exam:  Vitals:    07/03/23 1433   BP: 118/60   Pulse: (!) 116   Resp: 16   Temp: 98.1 °F (36.7 °C)     Weight: 80.5 kg (177 lb 6.4 oz)   Body mass index is 30.45 kg/m².      BP Readings from Last 3 Encounters:   07/03/23 118/60   06/28/23 110/62   06/02/23 (!) 114/58     Wt Readings from Last 3 Encounters:   07/03/23 1433 80.5 kg (177 lb 6.4 oz)   06/28/23 1443 80.5 kg (177 lb 7.5 oz)   06/02/23 0952 80.3 kg (177 lb)            Physical Exam  Constitutional:       General: She is not in acute distress.     Appearance: She is not ill-appearing.   HENT:      Head: Normocephalic.      Nose: Nose normal.      Mouth/Throat:      Mouth: Mucous membranes are moist.   Eyes:      General: No scleral icterus.  Cardiovascular:      Rate and Rhythm: Tachycardia present. Rhythm irregular.   Pulmonary:      Effort: No respiratory distress.      Breath sounds: Normal breath sounds.   Abdominal:      General: There is no distension.      Palpations:  Abdomen is soft.      Tenderness: There is no abdominal tenderness.   Musculoskeletal:         General: No swelling.   Skin:     General: Skin is warm and dry.   Neurological:      Mental Status: She is alert. Mental status is at baseline.   Psychiatric:         Mood and Affect: Mood normal.         Behavior: Behavior normal.         Thought Content: Thought content normal.       Laboratory Reviewed: (Yes)  Lab Results   Component Value Date    WBC 6.61 04/27/2023    HGB 12.6 04/27/2023    HCT 40.7 04/27/2023     04/27/2023    CHOL 168 08/31/2022    TRIG 98 08/31/2022    HDL 43 08/31/2022    ALT 16 01/12/2023    AST 21 01/12/2023     04/27/2023    K 3.7 04/27/2023    CL 99 04/27/2023    CREATININE 2.1 (H) 04/27/2023    BUN 38 (H) 04/27/2023    CO2 25 04/27/2023    TSH 3.577 04/27/2023    INR 1.1 10/25/2022    HGBA1C 7.0 (H) 03/20/2023           Health Maintenance  Health Maintenance Topics with due status: Not Due       Topic Last Completion Date    Lipid Panel 08/31/2022    Influenza Vaccine 11/09/2022    Hemoglobin A1c (Prediabetes) 03/20/2023     Health Maintenance Due   Topic Date Due    Shingles Vaccine (1 of 2) Never done    TETANUS VACCINE  11/09/2021    Pneumococcal Vaccines (Age 65+) (2 - PCV) 03/16/2023                Assessment:  Problem List Items Addressed This Visit          Cardiac/Vascular    Atrial fibrillation with RVR     Rate uncontrolled. Increase metoprolol to 50 mg BID. Take midodrine 10 mg TID as prescribed.   Check VS BID. Call me Wednesday with HR, BP readings.           Other Visit Diagnoses       Hypotension, unspecified hypotension type    -  Primary    Relevant Medications    midodrine (PROAMATINE) 5 MG Tab    PAF (paroxysmal atrial fibrillation)  (Chronic)       Relevant Medications    metoprolol tartrate (LOPRESSOR) 25 MG tablet              Plan:  Hypotension, unspecified hypotension type  -     midodrine (PROAMATINE) 5 MG Tab; Take 1-2 tablet 3 times daily for  hypotension  Dispense: 90 tablet; Refill: 11    Atrial fibrillation with RVR    PAF (paroxysmal atrial fibrillation)  -     metoprolol tartrate (LOPRESSOR) 25 MG tablet; Take 2 tablets (50 mg total) by mouth 2 (two) times daily.  Dispense: 360 tablet; Refill: 3      -Patient's lab results were reviewed and discussed with patient  -Treatment options and alternatives were discussed with the patient. Patient expressed understanding. Patient was given the opportunity to ask questions and be an active participant in their medical care. Patient had no further questions or concerns at this time.   -Documentation of patient's health and condition was obtained from family member who was present during visit.  -Patient is an overall moderate risk for health complications from their medical conditions.       Follow up: Follow up in about 1 week (around 7/10/2023).      After visit summary printed and given to patient upon discharge.  Patient goals and care plan are included in After visit summary.    Total medical decision making time was 57 min.  The following issues were discussed: The primary encounter diagnosis was Hypotension, unspecified hypotension type. Diagnoses of Atrial fibrillation with RVR and PAF (paroxysmal atrial fibrillation) were also pertinent to this visit.    Health maintenance needs, recent test results and goals of care discussed with pt and questions answered.

## 2023-07-05 ENCOUNTER — TELEPHONE (OUTPATIENT)
Dept: PRIMARY CARE CLINIC | Facility: CLINIC | Age: 88
End: 2023-07-05
Payer: MEDICARE

## 2023-07-06 NOTE — TELEPHONE ENCOUNTER
Having more palpitations since exam Monday. Only took midodrine 5mg AM and noon yesterday. Decreased HR lasting one hour after midodrine then began having tachycardia and palpitations. /79, P 80 this AM, no palpitations. Feels better off of midodrine, less palpitations. She believes she has faster HR and palpitations after taking midodrine. Some dyspnea yesterday with elevated HR, none today. No chest congestion. No weight gain. This AM she only took one metoprolol 25 mg tablet.    Plan: Change midodrine to PRN low BP, continue metoprolol 50 mg BID - take second pill this AM. If HR rises again plan to increase metoprolol to 75 mg BID. If any sx of acute HF or CP then to ER for treatment. Call tomorrow with an update of condition, VS, weight, HR, meds taken.

## 2023-07-07 ENCOUNTER — PATIENT MESSAGE (OUTPATIENT)
Dept: INFECTIOUS DISEASES | Facility: CLINIC | Age: 88
End: 2023-07-07
Payer: MEDICARE

## 2023-07-07 ENCOUNTER — TELEPHONE (OUTPATIENT)
Dept: PRIMARY CARE CLINIC | Facility: CLINIC | Age: 88
End: 2023-07-07
Payer: MEDICARE

## 2023-07-07 NOTE — TELEPHONE ENCOUNTER
Pt called in with blood pressure and pulse readings. Pt states she's not taking Proamatine as directed. States she was in A-fib at around noon on 7/6/23. Pt states she took two Lopressor and one Lasix at 10 pm on 7/6/23. Pt states she went back into a-fib at about 7 am. Her ankles are slightly swollen and has a two pound weight gain. No complaints of shortness of breath. Pt states she's tired but feels good. Still wants medical advice about what to do if heart rate is above 100, was given instructions by Heather per pt, on what to do in this situation.     7/6 Morning B/P reading    131/79 P 101    Noon  104/61 P 91    10 pm  137/96 P 119      7/7 Morning B/P reading    139/86 P 90    Noon  144/84 P 104

## 2023-07-07 NOTE — TELEPHONE ENCOUNTER
Stopped midodrine   Advised ok to take an extra metoprolol if heart rate over 100 as long as SBP at least 110    Reports she did take an extra 25 mg of metoprolol yesterday  Reports took an extra 40 mg of furosemide this afternoon due to increased edema   SOB ok

## 2023-07-10 ENCOUNTER — TELEPHONE (OUTPATIENT)
Dept: OPHTHALMOLOGY | Facility: CLINIC | Age: 88
End: 2023-07-10
Payer: MEDICARE

## 2023-07-10 NOTE — TELEPHONE ENCOUNTER
Pt states that the Azopt is not covered and was told it went through appeals and Dr Ware did not respond back to the appeal, therefore it was denied     I told her I would reach  out to [facility] staff and also Anshu to see if she saw a PA in the system for her.     She has not started the Azopt yet and only using the rhopressa and travatan at this time   She has multiple drug allergies   She also wants to see what else Dr Ware wants her to use while this is not covered   She was using the mail order system for the refills

## 2023-07-10 NOTE — TELEPHONE ENCOUNTER
----- Message from Redd Beckford sent at 7/10/2023  2:20 PM CDT -----  Contact: Shirley  [facility] is calling in regards to a medication, brinzolamide (AZOPT) 1 % ophthalmic suspension, and needs provider to sign information from [employer] to make medication cost less. Please call back at [phone]          Thanks  SESAR

## 2023-07-14 ENCOUNTER — TELEPHONE (OUTPATIENT)
Dept: PRIMARY CARE CLINIC | Facility: CLINIC | Age: 88
End: 2023-07-14
Payer: MEDICARE

## 2023-07-14 NOTE — TELEPHONE ENCOUNTER
Pt called with BP readings   148/75 57 this am    131/61 61 1:30pm    Breathing is better, weight down a couple pounds, feels ok     Denies SOB, almost back to normal

## 2023-07-18 ENCOUNTER — OFFICE VISIT (OUTPATIENT)
Dept: PRIMARY CARE CLINIC | Facility: CLINIC | Age: 88
End: 2023-07-18
Payer: MEDICARE

## 2023-07-18 VITALS
SYSTOLIC BLOOD PRESSURE: 140 MMHG | BODY MASS INDEX: 30.16 KG/M2 | HEART RATE: 55 BPM | WEIGHT: 176.63 LBS | HEIGHT: 64 IN | TEMPERATURE: 99 F | DIASTOLIC BLOOD PRESSURE: 62 MMHG | OXYGEN SATURATION: 94 %

## 2023-07-18 DIAGNOSIS — I10 PRIMARY HYPERTENSION: Primary | Chronic | ICD-10-CM

## 2023-07-18 DIAGNOSIS — I48.0 PAF (PAROXYSMAL ATRIAL FIBRILLATION): Chronic | ICD-10-CM

## 2023-07-18 DIAGNOSIS — I50.32 CHRONIC DIASTOLIC CONGESTIVE HEART FAILURE: ICD-10-CM

## 2023-07-18 DIAGNOSIS — I48.91 ATRIAL FIBRILLATION WITH RVR: ICD-10-CM

## 2023-07-18 LAB
ALBUMIN SERPL BCP-MCNC: 3.9 G/DL (ref 3.5–5.2)
ANION GAP SERPL CALC-SCNC: 15 MMOL/L (ref 8–16)
BUN SERPL-MCNC: 37 MG/DL (ref 8–23)
CALCIUM SERPL-MCNC: 10.3 MG/DL (ref 8.7–10.5)
CHLORIDE SERPL-SCNC: 103 MMOL/L (ref 95–110)
CO2 SERPL-SCNC: 25 MMOL/L (ref 23–29)
CREAT SERPL-MCNC: 1.9 MG/DL (ref 0.5–1.4)
EST. GFR  (NO RACE VARIABLE): 24.8 ML/MIN/1.73 M^2
GLUCOSE SERPL-MCNC: 104 MG/DL (ref 70–110)
PHOSPHATE SERPL-MCNC: 3.7 MG/DL (ref 2.7–4.5)
POTASSIUM SERPL-SCNC: 3.9 MMOL/L (ref 3.5–5.1)
SODIUM SERPL-SCNC: 143 MMOL/L (ref 136–145)

## 2023-07-18 PROCEDURE — 80069 RENAL FUNCTION PANEL: CPT | Performed by: NURSE PRACTITIONER

## 2023-07-18 PROCEDURE — 3288F FALL RISK ASSESSMENT DOCD: CPT | Mod: CPTII,S$GLB,, | Performed by: NURSE PRACTITIONER

## 2023-07-18 PROCEDURE — 99999 PR PBB SHADOW E&M-EST. PATIENT-LVL IV: CPT | Mod: PBBFAC,,, | Performed by: NURSE PRACTITIONER

## 2023-07-18 PROCEDURE — 3288F PR FALLS RISK ASSESSMENT DOCUMENTED: ICD-10-PCS | Mod: CPTII,S$GLB,, | Performed by: NURSE PRACTITIONER

## 2023-07-18 PROCEDURE — 99213 PR OFFICE/OUTPT VISIT, EST, LEVL III, 20-29 MIN: ICD-10-PCS | Mod: S$GLB,,, | Performed by: NURSE PRACTITIONER

## 2023-07-18 PROCEDURE — 99999 PR PBB SHADOW E&M-EST. PATIENT-LVL IV: ICD-10-PCS | Mod: PBBFAC,,, | Performed by: NURSE PRACTITIONER

## 2023-07-18 PROCEDURE — 1101F PR PT FALLS ASSESS DOC 0-1 FALLS W/OUT INJ PAST YR: ICD-10-PCS | Mod: CPTII,S$GLB,, | Performed by: NURSE PRACTITIONER

## 2023-07-18 PROCEDURE — 1159F MED LIST DOCD IN RCRD: CPT | Mod: CPTII,S$GLB,, | Performed by: NURSE PRACTITIONER

## 2023-07-18 PROCEDURE — 1159F PR MEDICATION LIST DOCUMENTED IN MEDICAL RECORD: ICD-10-PCS | Mod: CPTII,S$GLB,, | Performed by: NURSE PRACTITIONER

## 2023-07-18 PROCEDURE — 99213 OFFICE O/P EST LOW 20 MIN: CPT | Mod: S$GLB,,, | Performed by: NURSE PRACTITIONER

## 2023-07-18 PROCEDURE — 1101F PT FALLS ASSESS-DOCD LE1/YR: CPT | Mod: CPTII,S$GLB,, | Performed by: NURSE PRACTITIONER

## 2023-07-18 RX ORDER — METOPROLOL TARTRATE 25 MG/1
37.5 TABLET, FILM COATED ORAL 2 TIMES DAILY
Qty: 270 TABLET | Refills: 3
Start: 2023-07-18 | End: 2023-08-07 | Stop reason: SDUPTHER

## 2023-07-18 NOTE — PATIENT INSTRUCTIONS
Decrease metoprolol dose as discussed. This medicine is primarily for heart rate control. If heart rate is over 100 please let me know.

## 2023-07-18 NOTE — PROGRESS NOTES
Shirley Metz  07/18/2023  [id number]    Phylicia Deleon MD  Patient Care Team:  Phylicia Deleon MD as PCP - General (Internal Medicine)  Wilbert Ware MD as Consulting Physician (Ophthalmology)  Rajinder Quintanilla MD as Consulting Physician (Ophthalmology)  Suzan Gregorio PA-C as Physician Assistant (Rheumatology)  Rosario Valadez MD as Consulting Physician (Dermatology)  Trevon Ramirez MD as Consulting Physician (Cardiology)  Amish Mendoza MD (Pulmonary Disease)  Jaquan Choi MD as Consulting Physician (Vascular Surgery)  Debbie Choi MD (Inactive) as Consulting Physician (Gynecology)  Emmanuel Fish MD as Consulting Physician (Hand Surgery)  PAXTON Chaidez Jr., MD as Consulting Physician (Orthopedic Surgery)  Phylicia Deleon MD as Physician (Internal Medicine)  Heather Miller NP as Nurse Practitioner (Family Medicine)      [facility]      Chief Complaint:  Chief Complaint   Patient presents with    Follow-up     One week follow up for low blood pressure.       History of Present Illness:  HPI    No dyspnea, no palpitations, no CP, mild edema but this is better overall.   BP improved overall. Typically SBP upper 120s-140s.   HR typically mid 50s-60s.     Some headache, temporal squeezing. Reports remote h/o similar headaches.   Significant pruritis to scalp and frequent, very soft Bms have returned with increase in midodrine dose.     Taking Lasix 40mg BID, midodrine 2 tablet BID, metoprolol 2 tablets BID.       Review of Systems      The following were reviewed: Active problem list, medication list, allergies, family history, social history, and Health Maintenance.       Medications:  Current Outpatient Medications on File Prior to Visit   Medication Sig Dispense Refill    acetaminophen (TYLENOL) 500 MG tablet Take 500 mg by mouth nightly as needed.      ALPRAZolam (XANAX) 0.5 MG tablet Take 1 tablet (0.5 mg total) by mouth nightly as needed for  Anxiety. 30 tablet 5    apixaban (ELIQUIS) 2.5 mg Tab Take 1 tablet (2.5 mg total) by mouth 2 (two) times daily. 180 tablet 3    b complex vitamins capsule Take 1 capsule by mouth once daily.      brinzolamide (AZOPT) 1 % ophthalmic suspension Place 1 drop into the left eye 2 (two) times a day. 5 mL 3    cholecalciferol, vitamin D3, (VITAMIN D3) 50 mcg (2,000 unit) Cap Take 1 capsule by mouth once daily.      coenzyme Q10 200 mg capsule Take 200 mg by mouth once daily.      fish oil-omega-3 fatty acids 300-1,000 mg capsule Take 1 capsule by mouth once daily.      furosemide (LASIX) 20 MG tablet Take 1 tablet (20 mg total) by mouth 2 (two) times a day. 180 tablet 1    midodrine (PROAMATINE) 5 MG Tab Take 1-2 tablet 3 times daily for hypotension 90 tablet 11    RHOPRESSA 0.02 % ophthalmic solution PLACE 1 DROP INTO THE LEFT EYE EVERY EVENING. 2.5 mL 11    sars-cov-2, covid-19 pfizer omicron, (PFIZER COVID BIVAL,12Y UP,,PF,) 30 mcg/0.3 mL injection Inject into the muscle. 0.3 mL 0    TRAVATAN Z 0.004 % ophthalmic solution Place 1 drop into the left eye every evening. 2.5 mL 12    [DISCONTINUED] metoprolol tartrate (LOPRESSOR) 25 MG tablet Take 2 tablets (50 mg total) by mouth 2 (two) times daily. 360 tablet 3    [DISCONTINUED] cloNIDine (CATAPRES) 0.1 MG tablet Take 1 tablet (0.1 mg total) by mouth 2 (two) times daily as needed (systolic BP over 180). 20 tablet 1    [DISCONTINUED] isosorbide mononitrate (IMDUR) 30 MG 24 hr tablet Take 1 tablet (30 mg total) by mouth every evening. 90 tablet 1     Current Facility-Administered Medications on File Prior to Visit   Medication Dose Route Frequency Provider Last Rate Last Admin    sodium chloride 0.9% flush 3 mL  3 mL Intravenous PRN Steve Galarza PA-C        vancomycin in dextrose 5 % 1 gram/250 mL IVPB 1,000 mg  1,000 mg Intravenous On Call Procedure Vianney Fountain NP           Medications have been reviewed and reconciled with patient at visit  today.    Barriers to medications present (no )    Fall since last office visit (no )      Exam:  Vitals:    07/18/23 1439   BP: (!) 140/62   Pulse: (!) 55   Temp: 98.6 °F (37 °C)     Weight: 80.1 kg (176 lb 9.6 oz)   Body mass index is 30.31 kg/m².      BP Readings from Last 3 Encounters:   07/18/23 (!) 140/62   07/12/23 (!) 149/70   07/03/23 118/60     Wt Readings from Last 3 Encounters:   07/18/23 1439 80.1 kg (176 lb 9.6 oz)   07/12/23 1236 83 kg (182 lb 14.4 oz)   07/03/23 1433 80.5 kg (177 lb 6.4 oz)            Physical Exam  Constitutional:       General: She is not in acute distress.  HENT:      Head: Normocephalic.      Mouth/Throat:      Mouth: Mucous membranes are moist.   Eyes:      General: No scleral icterus.  Cardiovascular:      Rate and Rhythm: Normal rate and regular rhythm.   Pulmonary:      Effort: Pulmonary effort is normal.      Breath sounds: Normal breath sounds.   Abdominal:      General: There is no distension.      Palpations: Abdomen is soft.      Tenderness: There is no abdominal tenderness.   Musculoskeletal:         General: No swelling.   Skin:     General: Skin is warm and dry.   Neurological:      Mental Status: She is alert. Mental status is at baseline.   Psychiatric:         Mood and Affect: Mood normal.         Behavior: Behavior normal.       Laboratory Reviewed: (Yes)  Lab Results   Component Value Date    WBC 6.61 04/27/2023    HGB 12.6 04/27/2023    HCT 40.7 04/27/2023     04/27/2023    CHOL 168 08/31/2022    TRIG 98 08/31/2022    HDL 43 08/31/2022    ALT 16 01/12/2023    AST 21 01/12/2023     04/27/2023    K 3.7 04/27/2023    CL 99 04/27/2023    CREATININE 2.1 (H) 04/27/2023    BUN 38 (H) 04/27/2023    CO2 25 04/27/2023    TSH 3.577 04/27/2023    INR 1.1 10/25/2022    HGBA1C 7.0 (H) 03/20/2023           Health Maintenance  Health Maintenance Topics with due status: Not Due       Topic Last Completion Date    Lipid Panel 08/31/2022    Influenza Vaccine  11/09/2022    Diabetes Urine Screening 03/17/2023    Hemoglobin A1c 03/20/2023     Health Maintenance Due   Topic Date Due    Shingles Vaccine (1 of 2) Never done    Eye Exam  11/06/2021    TETANUS VACCINE  11/09/2021    Pneumococcal Vaccines (Age 65+) (2 - PCV) 03/16/2023                Assessment:  Problem List Items Addressed This Visit          Cardiac/Vascular    HTN (hypertension) - Primary (Chronic)    Relevant Orders    RENAL FUNCTION PANEL    Chronic diastolic congestive heart failure     Presently at baseline weight with furosemide 40 mg BID.   RFP         Atrial fibrillation with RVR     Now with low ventricular rate.   Decrease metoprolol dose.   Continues DOAC.          Other Visit Diagnoses       PAF (paroxysmal atrial fibrillation)  (Chronic)       Relevant Medications    metoprolol tartrate (LOPRESSOR) 25 MG tablet              Plan:  Primary hypertension  -     RENAL FUNCTION PANEL; Future; Expected date: 07/18/2023    PAF (paroxysmal atrial fibrillation)  -     metoprolol tartrate (LOPRESSOR) 25 MG tablet; Take 1.5 tablets (37.5 mg total) by mouth 2 (two) times daily.  Dispense: 270 tablet; Refill: 3    Atrial fibrillation with RVR    Chronic diastolic congestive heart failure      -Patient's lab results were reviewed and discussed with patient  -Treatment options and alternatives were discussed with the patient. Patient expressed understanding. Patient was given the opportunity to ask questions and be an active participant in their medical care. Patient had no further questions or concerns at this time.   -Documentation of patient's health and condition was obtained from family member who was present during visit.  -Patient is an overall moderate risk for health complications from their medical conditions.       Follow up: Follow up in about 2 weeks (around 8/1/2023).      After visit summary printed and given to patient upon discharge.  Patient goals and care plan are included in After visit  summary.    Total medical decision making time was 26 min.  The following issues were discussed: The primary encounter diagnosis was Primary hypertension. Diagnoses of PAF (paroxysmal atrial fibrillation), Atrial fibrillation with RVR, and Chronic diastolic congestive heart failure were also pertinent to this visit.    Health maintenance needs, recent test results and goals of care discussed with pt and questions answered.

## 2023-07-19 NOTE — PROGRESS NOTES
Pt has [facility] - if message below not viewed please call w information below:   Renal function is stable. Continue current plan of care.    Please message or call with any questions or concerns!  Thank you!  Phylicia Deleon MD, MPH  [facility]

## 2023-07-20 ENCOUNTER — TELEPHONE (OUTPATIENT)
Dept: PRIMARY CARE CLINIC | Facility: CLINIC | Age: 88
End: 2023-07-20
Payer: MEDICARE

## 2023-07-20 NOTE — TELEPHONE ENCOUNTER
Pt called stating that bp is 164/90 P 71 this am. Concerned about taking meds this am.   midodrine (PROAMATINE  metoprolol tartrate (LOPRESSOR)  furosemide (LASIX)     Please advise.

## 2023-07-26 ENCOUNTER — TELEPHONE (OUTPATIENT)
Dept: PRIMARY CARE CLINIC | Facility: CLINIC | Age: 88
End: 2023-07-26
Payer: MEDICARE

## 2023-07-26 DIAGNOSIS — I48.91 ATRIAL FIBRILLATION WITH RVR: ICD-10-CM

## 2023-07-26 DIAGNOSIS — R06.00 DYSPNEA, UNSPECIFIED TYPE: Primary | ICD-10-CM

## 2023-07-26 DIAGNOSIS — R07.9 ACUTE CHEST PAIN: ICD-10-CM

## 2023-07-26 NOTE — TELEPHONE ENCOUNTER
Pt called asking to talk to Heather. Pt told that she was with a pt and I would send the message.

## 2023-07-26 NOTE — TELEPHONE ENCOUNTER
Pt called stating that blood pressure this am 159/78 P 64- also having some mild SOB. Pt would like to talk to Heather about medications. Asking for call back.  [phone] or [phone]

## 2023-07-27 NOTE — TELEPHONE ENCOUNTER
BP is higher. Now with some dyspnea that is worse in AM and improves some in evening. Weight is unchanged. No significant swelling. SOB at rest now. Took Lasix 20 mg last night, does not believe it helped dyspnea. Sleeps in bed with 2 pillows. Being supine does not worsen dyspnea.     HR typically in 60s.   SBP 140s-150s the past week.   POx 94-97 %     Current meds:  Metoprolol 37.5 mg BID  Midodrine - only taken two times in past week.   Lasix 20 mg BID except once took additional 20 mg for edema.   Eliquis 2.5 mg BID.    No hematochezia or melena.   No chest congestion.

## 2023-07-28 ENCOUNTER — HOSPITAL ENCOUNTER (OUTPATIENT)
Dept: RADIOLOGY | Facility: HOSPITAL | Age: 88
Discharge: HOME | End: 2023-07-28
Attending: NURSE PRACTITIONER
Payer: MEDICARE

## 2023-07-28 DIAGNOSIS — R06.00 DYSPNEA, UNSPECIFIED TYPE: ICD-10-CM

## 2023-07-28 DIAGNOSIS — I48.91 ATRIAL FIBRILLATION WITH RVR: ICD-10-CM

## 2023-07-28 PROCEDURE — 71046 X-RAY EXAM CHEST 2 VIEWS: CPT | Mod: 26,HCNC,, | Performed by: RADIOLOGY

## 2023-07-28 PROCEDURE — 71046 X-RAY EXAM CHEST 2 VIEWS: CPT | Mod: TC,HCNC

## 2023-07-28 PROCEDURE — 71046 XR CHEST PA AND LATERAL: ICD-10-PCS | Mod: 26,HCNC,, | Performed by: RADIOLOGY

## 2023-07-31 ENCOUNTER — PATIENT MESSAGE (OUTPATIENT)
Dept: PRIMARY CARE CLINIC | Facility: CLINIC | Age: 88
End: 2023-07-31
Payer: MEDICARE

## 2023-08-02 ENCOUNTER — OFFICE VISIT (OUTPATIENT)
Dept: PRIMARY CARE CLINIC | Facility: CLINIC | Age: 88
End: 2023-08-02
Payer: MEDICARE

## 2023-08-02 VITALS
WEIGHT: 175.5 LBS | SYSTOLIC BLOOD PRESSURE: 140 MMHG | TEMPERATURE: 98 F | OXYGEN SATURATION: 95 % | BODY MASS INDEX: 29.96 KG/M2 | HEIGHT: 64 IN | HEART RATE: 62 BPM | DIASTOLIC BLOOD PRESSURE: 54 MMHG

## 2023-08-02 DIAGNOSIS — R06.00 DYSPNEA, UNSPECIFIED TYPE: Primary | ICD-10-CM

## 2023-08-02 DIAGNOSIS — E78.2 MIXED HYPERLIPIDEMIA: ICD-10-CM

## 2023-08-02 DIAGNOSIS — R06.02 SHORTNESS OF BREATH: ICD-10-CM

## 2023-08-02 PROCEDURE — 1159F PR MEDICATION LIST DOCUMENTED IN MEDICAL RECORD: ICD-10-PCS | Mod: HCNC,CPTII,S$GLB, | Performed by: NURSE PRACTITIONER

## 2023-08-02 PROCEDURE — 3288F FALL RISK ASSESSMENT DOCD: CPT | Mod: HCNC,CPTII,S$GLB, | Performed by: NURSE PRACTITIONER

## 2023-08-02 PROCEDURE — 1159F MED LIST DOCD IN RCRD: CPT | Mod: HCNC,CPTII,S$GLB, | Performed by: NURSE PRACTITIONER

## 2023-08-02 PROCEDURE — 99999 PR PBB SHADOW E&M-EST. PATIENT-LVL IV: ICD-10-PCS | Mod: PBBFAC,HCNC,, | Performed by: NURSE PRACTITIONER

## 2023-08-02 PROCEDURE — 99214 OFFICE O/P EST MOD 30 MIN: CPT | Mod: HCNC,S$GLB,, | Performed by: NURSE PRACTITIONER

## 2023-08-02 PROCEDURE — 3288F PR FALLS RISK ASSESSMENT DOCUMENTED: ICD-10-PCS | Mod: HCNC,CPTII,S$GLB, | Performed by: NURSE PRACTITIONER

## 2023-08-02 PROCEDURE — 99214 PR OFFICE/OUTPT VISIT, EST, LEVL IV, 30-39 MIN: ICD-10-PCS | Mod: HCNC,S$GLB,, | Performed by: NURSE PRACTITIONER

## 2023-08-02 PROCEDURE — 1101F PT FALLS ASSESS-DOCD LE1/YR: CPT | Mod: HCNC,CPTII,S$GLB, | Performed by: NURSE PRACTITIONER

## 2023-08-02 PROCEDURE — 1101F PR PT FALLS ASSESS DOC 0-1 FALLS W/OUT INJ PAST YR: ICD-10-PCS | Mod: HCNC,CPTII,S$GLB, | Performed by: NURSE PRACTITIONER

## 2023-08-02 PROCEDURE — 99999 PR PBB SHADOW E&M-EST. PATIENT-LVL IV: CPT | Mod: PBBFAC,HCNC,, | Performed by: NURSE PRACTITIONER

## 2023-08-02 NOTE — PROGRESS NOTES
Shirley Metz  08/02/2023  [id number]    Phylicia Deleon MD  Patient Care Team:  Phylicia Deleon MD as PCP - General (Internal Medicine)  Wilbert Ware MD as Consulting Physician (Ophthalmology)  Rajinder Quintanilla MD as Consulting Physician (Ophthalmology)  Suzan Gregorio PA-C as Physician Assistant (Rheumatology)  Rosario Valadez MD as Consulting Physician (Dermatology)  Trevon Ramirez MD as Consulting Physician (Cardiology)  Amish Mendoza MD (Pulmonary Disease)  Jaquan Choi MD as Consulting Physician (Vascular Surgery)  Debbie Choi MD (Inactive) as Consulting Physician (Gynecology)  Emmanuel Fish MD as Consulting Physician (Hand Surgery)  PAXTON Chaidez Jr., MD as Consulting Physician (Orthopedic Surgery)  Phylicia Deleon MD as Physician (Internal Medicine)  Heather Miller NP as Nurse Practitioner (Family Medicine)      [facility] Note      Chief Complaint:  Chief Complaint   Patient presents with    Shortness of Breath     Pt is complaining of shortness of breath.        History of Present Illness:  HPI    Telephone Note from 7/26/23:  BP is higher. Now with some dyspnea that is worse in AM and improves some in evening. Weight is unchanged. No significant swelling. SOB at rest now. Took Lasix 20 mg last night, does not believe it helped dyspnea. Sleeps in bed with 2 pillows. Being supine does not worsen dyspnea.      HR typically in 60s.   SBP 140s-150s the past week.   POx 94-97 %      Current meds:  Metoprolol 37.5 mg BID  Midodrine - only taken two times in past week.   Lasix 20 mg BID except once took additional 20 mg for edema.   Eliquis 2.5 mg BID.     No hematochezia or melena.   No chest congestion.    Lab at baseline/slightly improved. CXR shows no acute dz pattern. Awaiting V/Q scan.     8/2/23:  Short of breath is persistent. Occurs with minimal exertion. Rare flutter/palpitations. No CP. Doesn't feel better overall.     Hoarse voice, sl  post nasal drip, sore throat. No cough. No chest congestion.     SBP typically 130s-150s now.   DBP typically 60-80s  HR upper 50s - 70s except on 7/28 in mid-upper 40s. Felt nauseated and weak when in 40s.     Taking midodrine as needed - twice in past two weeks.   Taking Lasix 20 mg BID except a 3 days with edema to feet and trunk to 40 mg BID. This improved with higher dose of Lasix.         Review of Systems   Constitutional:  Positive for activity change.   HENT:  Positive for postnasal drip. Negative for sinus pressure.    Respiratory:  Positive for shortness of breath. Negative for cough, chest tightness and wheezing.    Cardiovascular:  Negative for chest pain and palpitations.   Gastrointestinal:  Negative for abdominal pain.   Genitourinary:  Negative for dysuria.   Musculoskeletal:  Negative for arthralgias.         The following were reviewed: Active problem list, medication list, allergies, family history, social history, and Health Maintenance.       Medications:  Current Outpatient Medications on File Prior to Visit   Medication Sig Dispense Refill    acetaminophen (TYLENOL) 500 MG tablet Take 500 mg by mouth nightly as needed.      ALPRAZolam (XANAX) 0.5 MG tablet Take 1 tablet (0.5 mg total) by mouth nightly as needed for Anxiety. 30 tablet 5    apixaban (ELIQUIS) 2.5 mg Tab Take 1 tablet (2.5 mg total) by mouth 2 (two) times daily. 180 tablet 3    b complex vitamins capsule Take 1 capsule by mouth once daily.      brinzolamide (AZOPT) 1 % ophthalmic suspension Place 1 drop into the left eye 2 (two) times a day. 5 mL 3    cholecalciferol, vitamin D3, (VITAMIN D3) 50 mcg (2,000 unit) Cap Take 1 capsule by mouth once daily.      coenzyme Q10 200 mg capsule Take 200 mg by mouth once daily.      fish oil-omega-3 fatty acids 300-1,000 mg capsule Take 1 capsule by mouth once daily.      furosemide (LASIX) 20 MG tablet Take 1 tablet (20 mg total) by mouth 2 (two) times a day. 180 tablet 1    metoprolol  tartrate (LOPRESSOR) 25 MG tablet Take 1.5 tablets (37.5 mg total) by mouth 2 (two) times daily. 270 tablet 3    midodrine (PROAMATINE) 5 MG Tab Take 1-2 tablet 3 times daily for hypotension 90 tablet 11    RHOPRESSA 0.02 % ophthalmic solution PLACE 1 DROP INTO THE LEFT EYE EVERY EVENING. 2.5 mL 11    TRAVATAN Z 0.004 % ophthalmic solution Place 1 drop into the left eye every evening. 2.5 mL 12    sars-cov-2, covid-19 pfizer omicron, (PFIZER COVID BIVAL,12Y UP,,PF,) 30 mcg/0.3 mL injection Inject into the muscle. (Patient not taking: Reported on 8/2/2023) 0.3 mL 0    [DISCONTINUED] cloNIDine (CATAPRES) 0.1 MG tablet Take 1 tablet (0.1 mg total) by mouth 2 (two) times daily as needed (systolic BP over 180). 20 tablet 1    [DISCONTINUED] isosorbide mononitrate (IMDUR) 30 MG 24 hr tablet Take 1 tablet (30 mg total) by mouth every evening. 90 tablet 1     Current Facility-Administered Medications on File Prior to Visit   Medication Dose Route Frequency Provider Last Rate Last Admin    sodium chloride 0.9% flush 3 mL  3 mL Intravenous PRN Steve Galarza PA-C        vancomycin in dextrose 5 % 1 gram/250 mL IVPB 1,000 mg  1,000 mg Intravenous On Call Procedure Vianney Fountain NP           Medications have been reviewed and reconciled with patient at visit today.    Barriers to medications present (no )    Fall since last office visit (no )      Exam:  Vitals:    08/02/23 1355   BP: (!) 140/54   Pulse: 62   Temp: 98 °F (36.7 °C)     Weight: 79.6 kg (175 lb 8 oz)   Body mass index is 30.12 kg/m².      BP Readings from Last 3 Encounters:   08/02/23 (!) 140/54   07/18/23 (!) 140/62   07/12/23 (!) 149/70     Wt Readings from Last 3 Encounters:   08/02/23 1355 79.6 kg (175 lb 8 oz)   07/18/23 1439 80.1 kg (176 lb 9.6 oz)   07/12/23 1236 83 kg (182 lb 14.4 oz)            Physical Exam  Constitutional:       General: She is not in acute distress.  HENT:      Nose: Nose normal.      Mouth/Throat:      Mouth: Mucous  membranes are moist.      Pharynx: No oropharyngeal exudate or posterior oropharyngeal erythema.   Eyes:      General: No scleral icterus.  Cardiovascular:      Rate and Rhythm: Normal rate. Rhythm irregular.   Pulmonary:      Effort: Pulmonary effort is normal.      Breath sounds: Normal breath sounds.   Abdominal:      General: There is no distension.      Palpations: Abdomen is soft.      Tenderness: There is no abdominal tenderness.   Musculoskeletal:         General: Swelling (trace LLE) present.   Lymphadenopathy:      Cervical: No cervical adenopathy.   Skin:     General: Skin is warm and dry.   Neurological:      Mental Status: She is alert. Mental status is at baseline.   Psychiatric:         Mood and Affect: Mood normal.         Behavior: Behavior normal.         Laboratory Reviewed: (Yes)  Lab Results   Component Value Date    WBC 7.00 07/28/2023    HGB 12.2 07/28/2023    HCT 37.4 07/28/2023     07/28/2023    CHOL 168 08/31/2022    TRIG 98 08/31/2022    HDL 43 08/31/2022    ALT 14 07/28/2023    AST 19 07/28/2023     07/28/2023    K 4.0 07/28/2023     07/28/2023    CREATININE 1.5 (H) 07/28/2023    BUN 37 (H) 07/28/2023    CO2 22 (L) 07/28/2023    TSH 3.577 04/27/2023    INR 1.1 10/25/2022    HGBA1C 7.0 (H) 03/20/2023           Health Maintenance  Health Maintenance Topics with due status: Not Due       Topic Last Completion Date    Influenza Vaccine 11/09/2022    Diabetes Urine Screening 03/17/2023    Hemoglobin A1c 03/20/2023     Health Maintenance Due   Topic Date Due    Shingles Vaccine (1 of 2) Never done    Eye Exam  11/06/2021    TETANUS VACCINE  11/09/2021    Pneumococcal Vaccines (Age 65+) (2 - PCV) 03/16/2023    COVID-19 Vaccine (5 - Pfizer risk series) 04/12/2023    Lipid Panel  08/31/2023         Assessment:  Problem List Items Addressed This Visit          Pulmonary    Shortness of breath     Unclear cause. V/Q scan this Friday.   To ER if sx worsen. Continue age-appropriate  dose of Eliquis.   Previously she declined NMST. Discussed strong possibility of cardiac source.             Cardiac/Vascular    Mixed hyperlipidemia    Relevant Orders    Lipid Panel     Other Visit Diagnoses       Dyspnea, unspecified type    -  Primary    Relevant Orders    NM Lung Scan Ventilation Perfusion              Plan:  Dyspnea, unspecified type  -     NM Lung Scan Ventilation Perfusion; Future; Expected date: 08/02/2023    Mixed hyperlipidemia  -     Lipid Panel; Future; Expected date: 08/02/2023    Shortness of breath      -Patient's lab results were reviewed and discussed with patient  -Treatment options and alternatives were discussed with the patient. Patient expressed understanding. Patient was given the opportunity to ask questions and be an active participant in their medical care. Patient had no further questions or concerns at this time.   -Documentation of patient's health and condition was obtained from family member who was present during visit.  -Patient is an overall moderate risk for health complications from their medical conditions.       Follow up: Follow up in about 3 weeks (around 8/23/2023).      After visit summary printed and given to patient upon discharge.  Patient goals and care plan are included in After visit summary.    Total medical decision making time was 38 min.  The following issues were discussed: The primary encounter diagnosis was Dyspnea, unspecified type. Diagnoses of Mixed hyperlipidemia and Shortness of breath were also pertinent to this visit.    Health maintenance needs, recent test results and goals of care discussed with pt and questions answered.

## 2023-08-02 NOTE — Clinical Note
Good afternoon -  Ms. Flower is having a couple of issues. First, for the most past her BP and HR are at goal, however HR dropped to mid 40s for much of the day last week, she had fatigue and nausea then. Otherwise it's in upper 50s-70s. She's currently taking metoprolol 37.5 mg BID. With metoprolol 50 mg BID she had HR in 110s-120s so I'm hesitant to reduce the dose. Thoughts? Reduce dose again? Second, she reports increased dyspnea with minimal exertion and increased fatigue. Pox, CXR and lab unremarkable. She has V/Q scan Friday. Any other recommendations?   Thank you

## 2023-08-02 NOTE — ASSESSMENT & PLAN NOTE
Unclear cause. V/Q scan this Friday.   To ER if sx worsen. Continue age-appropriate dose of Eliquis.   Previously she declined NMST. Discussed strong possibility of cardiac source.

## 2023-08-04 ENCOUNTER — TELEPHONE (OUTPATIENT)
Dept: PRIMARY CARE CLINIC | Facility: CLINIC | Age: 88
End: 2023-08-04
Payer: MEDICARE

## 2023-08-04 ENCOUNTER — HOSPITAL ENCOUNTER (OUTPATIENT)
Dept: RADIOLOGY | Facility: HOSPITAL | Age: 88
Discharge: HOME | End: 2023-08-04
Attending: NURSE PRACTITIONER
Payer: MEDICARE

## 2023-08-04 DIAGNOSIS — R06.00 DYSPNEA, UNSPECIFIED TYPE: ICD-10-CM

## 2023-08-04 DIAGNOSIS — R07.9 ACUTE CHEST PAIN: ICD-10-CM

## 2023-08-04 DIAGNOSIS — I48.91 ATRIAL FIBRILLATION WITH RVR: ICD-10-CM

## 2023-08-04 PROCEDURE — A9540 TC99M MAA: HCPCS | Mod: HCNC

## 2023-08-04 PROCEDURE — 78582 LUNG VENTILAT&PERFUS IMAGING: CPT | Mod: 26,HCNC,, | Performed by: RADIOLOGY

## 2023-08-04 PROCEDURE — 71046 X-RAY EXAM CHEST 2 VIEWS: CPT | Mod: 26,HCNC,, | Performed by: RADIOLOGY

## 2023-08-04 PROCEDURE — 71046 X-RAY EXAM CHEST 2 VIEWS: CPT | Mod: TC,HCNC

## 2023-08-04 PROCEDURE — A9567 TECHNETIUM TC-99M AEROSOL: HCPCS | Mod: HCNC

## 2023-08-04 PROCEDURE — 71046 XR CHEST PA AND LATERAL: ICD-10-PCS | Mod: 26,HCNC,, | Performed by: RADIOLOGY

## 2023-08-04 PROCEDURE — 78582 NM LUNG VENTILATION AND PERFUSION IMAGING: ICD-10-PCS | Mod: 26,HCNC,, | Performed by: RADIOLOGY

## 2023-08-04 NOTE — TELEPHONE ENCOUNTER
Pt called stating she would like to talk to Heather. Pt states that blood pressure was 190/107 states that she took Clonidine and it came down. Would like a call back before 12:00 today if possible.

## 2023-08-07 ENCOUNTER — TELEPHONE (OUTPATIENT)
Dept: PRIMARY CARE CLINIC | Facility: CLINIC | Age: 88
End: 2023-08-07
Payer: MEDICARE

## 2023-08-07 DIAGNOSIS — I48.0 PAF (PAROXYSMAL ATRIAL FIBRILLATION): Chronic | ICD-10-CM

## 2023-08-07 RX ORDER — METOPROLOL TARTRATE 25 MG/1
37.5 TABLET, FILM COATED ORAL 2 TIMES DAILY
Qty: 180 TABLET | Refills: 3
Start: 2023-08-07 | End: 2023-08-08 | Stop reason: SDUPTHER

## 2023-08-07 NOTE — TELEPHONE ENCOUNTER
----- Message from Heather Miller NP sent at 8/7/2023  7:39 AM CDT -----  Please let patient know that results are at baseline.   Continue plan of care as discussed.

## 2023-08-08 DIAGNOSIS — I48.0 PAF (PAROXYSMAL ATRIAL FIBRILLATION): Chronic | ICD-10-CM

## 2023-08-08 RX ORDER — METOPROLOL TARTRATE 25 MG/1
37.5 TABLET, FILM COATED ORAL 2 TIMES DAILY
Qty: 180 TABLET | Refills: 3 | Status: SHIPPED | OUTPATIENT
Start: 2023-08-08 | End: 2024-01-29

## 2023-08-08 NOTE — PROGRESS NOTES
Pt noted to have HR to 47 this AM.   She does not believe she was in atrial fibrillation when HR was low.   She took midodrine this AM, HR normal now.   Ran out of metoprolol.   RUIZ improves in afternoons/evenings.     She may consider a procedure if still in atrial fibrillation.   She would like to continue current POC for now and reevaluate if HR decreases further with sx.     SBP typically in 140s.

## 2023-08-08 NOTE — TELEPHONE ENCOUNTER
----- Message from Xochitl Conley sent at 8/8/2023  9:17 AM CDT -----  Patient states her heart rate is in the 40's and she was told to notify the nurse when this occurs. Also states she need a refill on Metoprolol and states she borrowed pills from someone.

## 2023-08-14 ENCOUNTER — PATIENT MESSAGE (OUTPATIENT)
Dept: PRIMARY CARE CLINIC | Facility: CLINIC | Age: 88
End: 2023-08-14
Payer: MEDICARE

## 2023-08-15 ENCOUNTER — OFFICE VISIT (OUTPATIENT)
Dept: OPHTHALMOLOGY | Facility: CLINIC | Age: 88
End: 2023-08-15
Payer: MEDICARE

## 2023-08-15 ENCOUNTER — PATIENT MESSAGE (OUTPATIENT)
Dept: PRIMARY CARE CLINIC | Facility: CLINIC | Age: 88
End: 2023-08-15
Payer: MEDICARE

## 2023-08-15 DIAGNOSIS — H40.1131 PRIMARY OPEN ANGLE GLAUCOMA (POAG) OF BOTH EYES, MILD STAGE: Primary | ICD-10-CM

## 2023-08-15 DIAGNOSIS — Z96.1 PSEUDOPHAKIA: ICD-10-CM

## 2023-08-15 DIAGNOSIS — M35.01 KERATITIS SICCA, BILATERAL: ICD-10-CM

## 2023-08-15 PROCEDURE — 1160F RVW MEDS BY RX/DR IN RCRD: CPT | Mod: HCNC,CPTII,S$GLB, | Performed by: OPHTHALMOLOGY

## 2023-08-15 PROCEDURE — 99214 OFFICE O/P EST MOD 30 MIN: CPT | Mod: HCNC,S$GLB,, | Performed by: OPHTHALMOLOGY

## 2023-08-15 PROCEDURE — 1159F MED LIST DOCD IN RCRD: CPT | Mod: HCNC,CPTII,S$GLB, | Performed by: OPHTHALMOLOGY

## 2023-08-15 PROCEDURE — 99999 PR PBB SHADOW E&M-EST. PATIENT-LVL III: ICD-10-PCS | Mod: PBBFAC,HCNC,, | Performed by: OPHTHALMOLOGY

## 2023-08-15 PROCEDURE — 99214 PR OFFICE/OUTPT VISIT, EST, LEVL IV, 30-39 MIN: ICD-10-PCS | Mod: HCNC,S$GLB,, | Performed by: OPHTHALMOLOGY

## 2023-08-15 PROCEDURE — 1160F PR REVIEW ALL MEDS BY PRESCRIBER/CLIN PHARMACIST DOCUMENTED: ICD-10-PCS | Mod: HCNC,CPTII,S$GLB, | Performed by: OPHTHALMOLOGY

## 2023-08-15 PROCEDURE — 99999 PR PBB SHADOW E&M-EST. PATIENT-LVL III: CPT | Mod: PBBFAC,HCNC,, | Performed by: OPHTHALMOLOGY

## 2023-08-15 PROCEDURE — 1159F PR MEDICATION LIST DOCUMENTED IN MEDICAL RECORD: ICD-10-PCS | Mod: HCNC,CPTII,S$GLB, | Performed by: OPHTHALMOLOGY

## 2023-08-15 NOTE — PROGRESS NOTES
SUBJECTIVE  Shirley Metz is 89 y.o. female  Corrected distance visual acuity was 20/30 -1 in the right eye and 20/40 -1 in the left eye.   Chief Complaint   Patient presents with    Glaucoma     Patient here for IOP check. Patient was instructed to use Patient instructed to continue using the following glaucoma medication as follows:  Rhopressa one drop in the left eye nightly, Travatan-Z one drop in the left eye nightly, and Timolol one drop in the left eye every morning. Patient states she using Dorzolamide in the left eye in the morning not Timolol.      Dry Eye     Patient states her eyes has been dry and a little itchy. Patient currently uses Refresh to help with symptoms.          HPI     Glaucoma     Additional comments: Patient here for IOP check. Patient was instructed   to use Patient instructed to continue using the following glaucoma   medication as follows:  Rhopressa one drop in the left eye nightly, Travatan-Z one drop in the   left eye nightly, and Timolol one drop in the left eye every morning.   Patient states she using Dorzolamide in the left eye in the morning not   Timolol.             Dry Eye     Additional comments: Patient states her eyes has been dry and a little   itchy. Patient currently uses Refresh to help with symptoms.           Comments    Likes ONL 1 pm  Lives at [facility] sx on 8/21/18     1. Mild COAG Goal < 19   SLT OD 3/04 (20 to 15) + 3/11 (19 to 15)   SLT OS 5/05 (20 to 14) +5/11 (18-19 to 15) + 3/12 (min resp.) + 3/11/15   (20.5-17.5) + 3/7/18 (24- 21) + 11/3/21 (22.5-17.5)  (Cosopt, Xalatan, and Timolol cause Myalgias)   (Alphagan causes redness) (zioptan too expensive)   Possible steroid responder   2. PCIOL OU   3. Mild Dry AMD   4. Fuch's Dystrophy   DSAEK OD 4/16 Dr. Quintanilla (followed by Dwight every summer)   DSAEK OS 8/15 Dr. Quintanilla   Rx Inveltys (1% lotemax) (IOP 15/23) 5/29/20   Dr. Quintanilla Ok'd to stop Lotemax OU  5. Dry Eye  -intolerance to Restasis   6. Recent A-Fib. (from Labetolol ?)       Systane Ultra 4-5 tiimes daily   Travatan Z qhs os   Rhopressa qhs OS   Timolol qam OS    +HAG          Last edited by Alcira Jacobo on 8/15/2023  1:43 PM.         Assessment /Plan :  1. Primary open angle glaucoma (POAG) of both eyes, mild stage   Doing well, intraocular pressure (IOP) OD within acceptable range relative to target IOP and no evidence of progression. Continue current treatment. Reviewed importance of continued compliance with treatment and follow up.    Intraocular pressure (IOP) OS not within acceptable range relative to target IOP with risk of irreversible visual loss. Better IOP control is recommended. Treatment options may include change or additional medications, Selective Laser Trabeculoplasty (SLT laser), and/or incisional glaucoma surgery. Reviewed importance of continued compliance with treatment and follow up.     Patient chooses defer and recheck IOP next visit  If IOP is still increased may consider the Xen Gel OS      Patient instructed to continue using the following glaucoma medication as follows:  Rhopressa one drop in the left eye nightly, Travatan-Z one drop in the left eye nightly, and Azopt one drop in the left eye every morning       2. Keratitis sicca, bilateral  -- Condition stable, no therapeutic change required. Monitoring routinely.     3. Pseudophakia  -- Condition stable, no therapeutic change required. Monitoring routinely.       RTC in 3 months for IOP check, 24-2 HVF and GOCT

## 2023-08-16 DIAGNOSIS — E11.21 TYPE 2 DIABETES MELLITUS WITH DIABETIC NEPHROPATHY, WITHOUT LONG-TERM CURRENT USE OF INSULIN: Primary | ICD-10-CM

## 2023-08-21 NOTE — TELEPHONE ENCOUNTER
"Thank you for coming in to see me today, and congratulations on paying attention to staying healthy!    The single most important factor in staying healthy is eating a healthy diet.  Research has shown that the healthiest diet for humans is one rich in whole fresh plant foods.  This means most of what you eat should be fresh fruits and vegetables, whole grains, beans, nuts and seeds.  Adding meat, dairy foods and eggs does not make your food healthier (although it may make it taste better!) so you should work to minimize the amount of beef, chicken, pork, turkey, lamb, cheese and eggs you eat.  Fatty fish like salmon and tuna may be healthier alternatives.  If you would like more information please check out the website \"[employer],\" and the books \"The Blue Zones\" by Maximiliano Ribeiro and \"[employer] Diet\" by Sedrick Zambrano.    I don't like recommending \"exercise\" because that has unpleasant connotations of pain and being out of breath for many people.  Instead, I encourage my patients to find a way to move your body that you LOVE and would want to do even if it were bad for you!  Playing a fun sport like pickleball, doing yoga or ines chi, studying martial arts, learning to dance, even chasing your kids or grandkids around the backyard are great examples of activity that makes your heart healthier.  Remember, if you don't like it, don't do it!  There are plenty of fun options, pick one and try it out!  Aim for at least 30 minutes of activity that gets your heart revved up, most days per week.    We discussed some screening tests for you today, these tests are meant to find problems before they make you sick, when they are easier to treat and less likely to impact your health.  Depending on your age and stage of life they may include blood tests, a Pap test, a mammogram, a bone density test, a colonoscopy and others.  If you have questions later about these or other recommended tests please call and ask!    There are " Please advise.   a number of other things you can do to improve your health.  These may include things like   Increasing the amount of water you drink every day (aim for 1 oz of water for every 2 pounds of body weight, up to about 100 oz total)  Getting 7-8 hours of sleep every night  Avoiding smoking, drinking alcohol, and using recreational drugs    Stress management is also a very important component of staying healthy.  One of the most effective stress management tools is meditation.  I recommend everyone consider proper training in meditation - it isn't just sitting with your eyes closed and breathing.  You can find a training program in your area at [employer] or [employer].    An annual physical is a great measure to keep you as healthy as you possibly can be.  Good for you for getting that done!  See you next year :-)       Ways to Help Prevent Falls at Home    Quick Tips   ? Ask for help if you need it. Most people want to help!   ? Get up slowly after sitting or laying down   ? Wear a medical alert device or keep cell phone in your pocket   ? Use night lights, especially areas near a bathroom   ? Keep the items you use often within reach on a small stool or end table   ? Use an assistive device such as walker or cane, as directed by provider/physical therapy   ? Use a non-slip mat and grab bars in your bathroom. Look for home health sections for best options     Other Areas to Focus On   ? Exercise and nutrition: Regular exercise or taking a falls prevention class are great ways improve strength and balance. Don’t forget to stay hydrated and bring a snack!   ? Medicine side effects: Some medicines can make you sleepy or dizzy, which could cause a fall. Ask your healthcare provider about the side effects your medicines could cause. Be sure to let them know if you take any vitamins or supplements as well.   ? Tripping hazards: Remove items you could trip on, such as loose mats, rugs, cords, and clutter. Wear closed toe  shoes with rubber soles.   ? Health and wellness: Get regular checkups with your healthcare provider, plus routine vision and hearing screenings. Talk with your healthcare provider about:   o Your medicines and the possible side effects - bring them in a bag if that is easier!   o Problems with balance or feeling dizzy   o Ways to promote bone health, such as Vitamin D and calcium supplements   o Questions or concerns about falling     *Ask your healthcare team if you have questions     ©[facility], 2022

## 2023-08-30 ENCOUNTER — OFFICE VISIT (OUTPATIENT)
Dept: PRIMARY CARE CLINIC | Facility: CLINIC | Age: 88
End: 2023-08-30
Payer: MEDICARE

## 2023-08-30 ENCOUNTER — TELEPHONE (OUTPATIENT)
Dept: PRIMARY CARE CLINIC | Facility: CLINIC | Age: 88
End: 2023-08-30

## 2023-08-30 VITALS
DIASTOLIC BLOOD PRESSURE: 62 MMHG | HEART RATE: 107 BPM | OXYGEN SATURATION: 95 % | SYSTOLIC BLOOD PRESSURE: 108 MMHG | WEIGHT: 174.38 LBS | BODY MASS INDEX: 29.94 KG/M2

## 2023-08-30 DIAGNOSIS — I13.2 HYPERTENSIVE HEART AND RENAL DISEASE WITH BOTH (CONGESTIVE) HEART FAILURE AND RENAL FAILURE: ICD-10-CM

## 2023-08-30 DIAGNOSIS — N19 HYPERTENSIVE HEART AND RENAL DISEASE WITH BOTH (CONGESTIVE) HEART FAILURE AND RENAL FAILURE: ICD-10-CM

## 2023-08-30 DIAGNOSIS — I48.91 ATRIAL FIBRILLATION WITH RVR: Primary | ICD-10-CM

## 2023-08-30 PROCEDURE — 1125F AMNT PAIN NOTED PAIN PRSNT: CPT | Mod: HCNC,CPTII,S$GLB, | Performed by: NURSE PRACTITIONER

## 2023-08-30 PROCEDURE — 99999 PR PBB SHADOW E&M-EST. PATIENT-LVL IV: ICD-10-PCS | Mod: PBBFAC,HCNC,, | Performed by: NURSE PRACTITIONER

## 2023-08-30 PROCEDURE — 1101F PR PT FALLS ASSESS DOC 0-1 FALLS W/OUT INJ PAST YR: ICD-10-PCS | Mod: HCNC,CPTII,S$GLB, | Performed by: NURSE PRACTITIONER

## 2023-08-30 PROCEDURE — 3288F FALL RISK ASSESSMENT DOCD: CPT | Mod: HCNC,CPTII,S$GLB, | Performed by: NURSE PRACTITIONER

## 2023-08-30 PROCEDURE — 99215 PR OFFICE/OUTPT VISIT, EST, LEVL V, 40-54 MIN: ICD-10-PCS | Mod: HCNC,S$GLB,, | Performed by: NURSE PRACTITIONER

## 2023-08-30 PROCEDURE — 3288F PR FALLS RISK ASSESSMENT DOCUMENTED: ICD-10-PCS | Mod: HCNC,CPTII,S$GLB, | Performed by: NURSE PRACTITIONER

## 2023-08-30 PROCEDURE — 1125F PR PAIN SEVERITY QUANTIFIED, PAIN PRESENT: ICD-10-PCS | Mod: HCNC,CPTII,S$GLB, | Performed by: NURSE PRACTITIONER

## 2023-08-30 PROCEDURE — 99215 OFFICE O/P EST HI 40 MIN: CPT | Mod: HCNC,S$GLB,, | Performed by: NURSE PRACTITIONER

## 2023-08-30 PROCEDURE — 99999 PR PBB SHADOW E&M-EST. PATIENT-LVL IV: CPT | Mod: PBBFAC,HCNC,, | Performed by: NURSE PRACTITIONER

## 2023-08-30 PROCEDURE — 1101F PT FALLS ASSESS-DOCD LE1/YR: CPT | Mod: HCNC,CPTII,S$GLB, | Performed by: NURSE PRACTITIONER

## 2023-08-30 NOTE — PROGRESS NOTES
Shirley Metz  09/07/2023  [id number]    Phylicia Deleon MD  Patient Care Team:  Phylicia Deleon MD as PCP - General (Internal Medicine)  Wilbert Ware MD as Consulting Physician (Ophthalmology)  Rajinder Quintanilla MD as Consulting Physician (Ophthalmology)  Suzan Gregorio PA-C as Physician Assistant (Rheumatology)  Rosario Valadez MD as Consulting Physician (Dermatology)  Trevon Ramirez MD as Consulting Physician (Cardiology)  Amish Mendoza MD (Pulmonary Disease)  Jaquan Choi MD as Consulting Physician (Vascular Surgery)  Debbie Choi MD (Inactive) as Consulting Physician (Gynecology)  Emmanuel Fish MD as Consulting Physician (Hand Surgery)  PAXTON Chaidez Jr., MD as Consulting Physician (Orthopedic Surgery)  Phylicia Deleon MD as Physician (Internal Medicine)  Heather Miller NP as Nurse Practitioner (Family Medicine)      [facility] Note      Chief Complaint:  Chief Complaint   Patient presents with    Follow-up       History of Present Illness:  HPI    F/U dyspnea, Afib. S/P resynchronization attempts and ablation.   Home SBP was in 130s-140s, then past week BP 92//74, SBP less than 100 twice.     Feeling better overall. No dyspnea, not dizzy. Some generalized weakness past couple days. Taking Lasix 20 BID and 40 mg 1-2 times weekly depending on sx, when she leaves home. Most of swelling in her abdomen, mostly on left. Hs taken midodrine a couple of times. This is also causing increased frequency of BM per pt.     Does not want to have EKG today.    Still issues with glaucoma.           The following were reviewed: Active problem list, medication list, allergies, family history, social history, and Health Maintenance.       Medications:  Current Outpatient Medications on File Prior to Visit   Medication Sig Dispense Refill    acetaminophen (TYLENOL) 500 MG tablet Take 500 mg by mouth nightly as needed.      ALPRAZolam (XANAX) 0.5 MG  tablet Take 1 tablet (0.5 mg total) by mouth nightly as needed for Anxiety. 30 tablet 5    apixaban (ELIQUIS) 2.5 mg Tab Take 1 tablet (2.5 mg total) by mouth 2 (two) times daily. 180 tablet 3    b complex vitamins capsule Take 1 capsule by mouth once daily.      brinzolamide (AZOPT) 1 % ophthalmic suspension Place 1 drop into the left eye 2 (two) times a day. 5 mL 3    cholecalciferol, vitamin D3, (VITAMIN D3) 50 mcg (2,000 unit) Cap Take 1 capsule by mouth once daily.      coenzyme Q10 200 mg capsule Take 200 mg by mouth once daily.      fish oil-omega-3 fatty acids 300-1,000 mg capsule Take 1 capsule by mouth once daily.      furosemide (LASIX) 20 MG tablet Take 1 tablet (20 mg total) by mouth 2 (two) times a day. 180 tablet 1    metoprolol tartrate (LOPRESSOR) 25 MG tablet Take 1.5 tablets (37.5 mg total) by mouth 2 (two) times daily. 180 tablet 3    midodrine (PROAMATINE) 5 MG Tab Take 1-2 tablet 3 times daily for hypotension 90 tablet 11    RHOPRESSA 0.02 % ophthalmic solution PLACE 1 DROP INTO THE LEFT EYE EVERY EVENING. 2.5 mL 11    TRAVATAN Z 0.004 % ophthalmic solution Place 1 drop into the left eye every evening. 2.5 mL 12    sars-cov-2, covid-19 pfizer omicron, (PFIZER COVID BIVAL,12Y UP,,PF,) 30 mcg/0.3 mL injection Inject into the muscle. (Patient not taking: Reported on 9/1/2023) 0.3 mL 0    [DISCONTINUED] cloNIDine (CATAPRES) 0.1 MG tablet Take 1 tablet (0.1 mg total) by mouth 2 (two) times daily as needed (systolic BP over 180). 20 tablet 1    [DISCONTINUED] isosorbide mononitrate (IMDUR) 30 MG 24 hr tablet Take 1 tablet (30 mg total) by mouth every evening. 90 tablet 1     Current Facility-Administered Medications on File Prior to Visit   Medication Dose Route Frequency Provider Last Rate Last Admin    sodium chloride 0.9% flush 3 mL  3 mL Intravenous PRN Steve Galarza, JOSE        vancomycin in dextrose 5 % 1 gram/250 mL IVPB 1,000 mg  1,000 mg Intravenous On Call Procedure Essie  Vianney GUZMÁN NP           Medications have been reviewed and reconciled with patient at visit today.    Barriers to medications present (yes )    Fall since last office visit (no )      Exam:  Vitals:    08/30/23 0939   BP: 108/62   Pulse: 107     Weight: 79.1 kg (174 lb 6.4 oz)   Body mass index is 29.94 kg/m².      BP Readings from Last 3 Encounters:   09/01/23 100/70   08/30/23 108/62   08/02/23 (!) 140/54     Wt Readings from Last 3 Encounters:   09/01/23 1321 79.2 kg (174 lb 9.7 oz)   08/30/23 0939 79.1 kg (174 lb 6.4 oz)   08/02/23 1355 79.6 kg (175 lb 8 oz)            Physical Exam  Constitutional:       General: She is not in acute distress.     Appearance: She is not ill-appearing.   Cardiovascular:      Rate and Rhythm: Regular rhythm. Tachycardia present.      Heart sounds: Normal heart sounds.   Pulmonary:      Effort: Pulmonary effort is normal.      Breath sounds: Normal breath sounds.   Abdominal:      General: Bowel sounds are normal. There is no distension.      Palpations: Abdomen is soft.      Tenderness: There is no abdominal tenderness.   Skin:     General: Skin is warm and dry.   Neurological:      Mental Status: She is alert and oriented to person, place, and time. Mental status is at baseline.   Psychiatric:         Behavior: Behavior normal.         Thought Content: Thought content normal.         Laboratory Reviewed: (Yes)  Lab Results   Component Value Date    WBC 7.00 07/28/2023    HGB 12.2 07/28/2023    HCT 37.4 07/28/2023     07/28/2023    CHOL 170 08/04/2023    TRIG 116 08/04/2023    HDL 40 08/04/2023    ALT 14 07/28/2023    AST 19 07/28/2023     09/06/2023    K 4.1 09/06/2023     09/06/2023    CREATININE 1.6 (H) 09/06/2023    BUN 33 (H) 09/06/2023    CO2 23 09/06/2023    TSH 3.577 04/27/2023    INR 1.1 10/25/2022    HGBA1C 6.3 (H) 09/06/2023           Health Maintenance  Health Maintenance Topics with due status: Not Due       Topic Last Completion Date    Diabetes  Urine Screening 03/17/2023    Lipid Panel 08/04/2023    Hemoglobin A1c 09/06/2023     Health Maintenance Due   Topic Date Due    Shingles Vaccine (1 of 2) Never done    Eye Exam  11/06/2021    TETANUS VACCINE  11/09/2021    Pneumococcal Vaccines (Age 65+) (2 - PCV) 03/16/2023    COVID-19 Vaccine (5 - Pfizer risk series) 04/12/2023    Influenza Vaccine (1) 09/01/2023           Assessment:  Problem List Items Addressed This Visit          Cardiac/Vascular    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   Declines EKG today.  Encouraged her to schedule follow up with cardiology.   She would like to do so after appt with nephrologist.          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 Patient expressed understanding. Patient was given the opportunity to ask questions and be an active participant in their medical care. Patient had no further questions or concerns at this time.   -Documentation of patient's health and condition was obtained from family member who was present during visit.  -Patient is an overall moderate risk for health complications from their medical conditions.       Follow up: Follow up in about 2 weeks (around 9/13/2023).      After visit summary printed and given to patient upon discharge.  Patient goals and care plan are included in After visit summary.    Total medical decision making time was 42 min.  The following issues were discussed: The primary encounter diagnosis was Atrial fibrillation with RVR. A diagnosis of Hypertensive heart and renal disease with both (congestive) heart  failure and renal failure was also pertinent to this visit.    Health maintenance needs, recent test results and goals of care discussed with pt and questions answered.

## 2023-09-01 ENCOUNTER — OFFICE VISIT (OUTPATIENT)
Dept: CARDIOLOGY | Facility: CLINIC | Age: 88
End: 2023-09-01
Payer: MEDICARE

## 2023-09-01 VITALS
OXYGEN SATURATION: 94 % | DIASTOLIC BLOOD PRESSURE: 70 MMHG | HEIGHT: 64 IN | HEART RATE: 108 BPM | WEIGHT: 174.63 LBS | BODY MASS INDEX: 29.81 KG/M2 | SYSTOLIC BLOOD PRESSURE: 100 MMHG

## 2023-09-01 DIAGNOSIS — N18.4 CKD STAGE 4 SECONDARY TO HYPERTENSION: ICD-10-CM

## 2023-09-01 DIAGNOSIS — I25.10 CORONARY ARTERY CALCIFICATION: ICD-10-CM

## 2023-09-01 DIAGNOSIS — I70.0 CALCIFICATION OF AORTA: ICD-10-CM

## 2023-09-01 DIAGNOSIS — I48.92 ATRIAL FLUTTER WITH RAPID VENTRICULAR RESPONSE: Primary | ICD-10-CM

## 2023-09-01 DIAGNOSIS — I95.9 HYPOTENSIVE EPISODE: ICD-10-CM

## 2023-09-01 DIAGNOSIS — I48.0 PAROXYSMAL ATRIAL FIBRILLATION: ICD-10-CM

## 2023-09-01 DIAGNOSIS — I10 PRIMARY HYPERTENSION: ICD-10-CM

## 2023-09-01 DIAGNOSIS — I11.9 HYPERTENSIVE LEFT VENTRICULAR HYPERTROPHY, WITHOUT HEART FAILURE: ICD-10-CM

## 2023-09-01 DIAGNOSIS — I49.3 PVC'S (PREMATURE VENTRICULAR CONTRACTIONS): ICD-10-CM

## 2023-09-01 DIAGNOSIS — I51.3 THROMBUS OF LEFT ATRIAL APPENDAGE: ICD-10-CM

## 2023-09-01 DIAGNOSIS — I13.2 HYPERTENSIVE HEART AND RENAL DISEASE WITH BOTH (CONGESTIVE) HEART FAILURE AND RENAL FAILURE: ICD-10-CM

## 2023-09-01 DIAGNOSIS — I50.33 ACUTE ON CHRONIC HEART FAILURE WITH PRESERVED EJECTION FRACTION: ICD-10-CM

## 2023-09-01 DIAGNOSIS — Z86.79 S/P ABLATION OF ATRIAL FIBRILLATION: ICD-10-CM

## 2023-09-01 DIAGNOSIS — G47.30 SLEEP APNEA, UNSPECIFIED TYPE: ICD-10-CM

## 2023-09-01 DIAGNOSIS — I48.91 ATRIAL FIBRILLATION WITH RVR: ICD-10-CM

## 2023-09-01 DIAGNOSIS — N19 HYPERTENSIVE HEART AND RENAL DISEASE WITH BOTH (CONGESTIVE) HEART FAILURE AND RENAL FAILURE: ICD-10-CM

## 2023-09-01 DIAGNOSIS — Z98.890 S/P ABLATION OF ATRIAL FIBRILLATION: ICD-10-CM

## 2023-09-01 DIAGNOSIS — I12.9 CKD STAGE 4 SECONDARY TO HYPERTENSION: ICD-10-CM

## 2023-09-01 DIAGNOSIS — I25.84 CORONARY ARTERY CALCIFICATION: ICD-10-CM

## 2023-09-01 DIAGNOSIS — I48.0 PAF (PAROXYSMAL ATRIAL FIBRILLATION): ICD-10-CM

## 2023-09-01 DIAGNOSIS — I65.23 CAROTID ARTERY CALCIFICATION, BILATERAL: ICD-10-CM

## 2023-09-01 PROCEDURE — 1101F PR PT FALLS ASSESS DOC 0-1 FALLS W/OUT INJ PAST YR: ICD-10-PCS | Mod: HCNC,CPTII,S$GLB, | Performed by: INTERNAL MEDICINE

## 2023-09-01 PROCEDURE — 99999 PR PBB SHADOW E&M-EST. PATIENT-LVL V: CPT | Mod: PBBFAC,HCNC,, | Performed by: INTERNAL MEDICINE

## 2023-09-01 PROCEDURE — 99999 PR PBB SHADOW E&M-EST. PATIENT-LVL V: ICD-10-PCS | Mod: PBBFAC,HCNC,, | Performed by: INTERNAL MEDICINE

## 2023-09-01 PROCEDURE — 3288F PR FALLS RISK ASSESSMENT DOCUMENTED: ICD-10-PCS | Mod: HCNC,CPTII,S$GLB, | Performed by: INTERNAL MEDICINE

## 2023-09-01 PROCEDURE — 1159F MED LIST DOCD IN RCRD: CPT | Mod: HCNC,CPTII,S$GLB, | Performed by: INTERNAL MEDICINE

## 2023-09-01 PROCEDURE — 1126F AMNT PAIN NOTED NONE PRSNT: CPT | Mod: HCNC,CPTII,S$GLB, | Performed by: INTERNAL MEDICINE

## 2023-09-01 PROCEDURE — 1160F RVW MEDS BY RX/DR IN RCRD: CPT | Mod: HCNC,CPTII,S$GLB, | Performed by: INTERNAL MEDICINE

## 2023-09-01 PROCEDURE — 1126F PR PAIN SEVERITY QUANTIFIED, NO PAIN PRESENT: ICD-10-PCS | Mod: HCNC,CPTII,S$GLB, | Performed by: INTERNAL MEDICINE

## 2023-09-01 PROCEDURE — 99214 PR OFFICE/OUTPT VISIT, EST, LEVL IV, 30-39 MIN: ICD-10-PCS | Mod: HCNC,S$GLB,, | Performed by: INTERNAL MEDICINE

## 2023-09-01 PROCEDURE — 3288F FALL RISK ASSESSMENT DOCD: CPT | Mod: HCNC,CPTII,S$GLB, | Performed by: INTERNAL MEDICINE

## 2023-09-01 PROCEDURE — 1160F PR REVIEW ALL MEDS BY PRESCRIBER/CLIN PHARMACIST DOCUMENTED: ICD-10-PCS | Mod: HCNC,CPTII,S$GLB, | Performed by: INTERNAL MEDICINE

## 2023-09-01 PROCEDURE — 1159F PR MEDICATION LIST DOCUMENTED IN MEDICAL RECORD: ICD-10-PCS | Mod: HCNC,CPTII,S$GLB, | Performed by: INTERNAL MEDICINE

## 2023-09-01 PROCEDURE — 99214 OFFICE O/P EST MOD 30 MIN: CPT | Mod: HCNC,S$GLB,, | Performed by: INTERNAL MEDICINE

## 2023-09-01 PROCEDURE — 1101F PT FALLS ASSESS-DOCD LE1/YR: CPT | Mod: HCNC,CPTII,S$GLB, | Performed by: INTERNAL MEDICINE
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
## 2023-09-06 ENCOUNTER — LAB VISIT (OUTPATIENT)
Dept: LAB | Facility: HOSPITAL | Age: 88
End: 2023-09-06
Attending: INTERNAL MEDICINE
Payer: MEDICARE

## 2023-09-06 DIAGNOSIS — N18.4 STAGE 4 CHRONIC KIDNEY DISEASE: ICD-10-CM

## 2023-09-06 DIAGNOSIS — E11.9 TYPE 2 DIABETES MELLITUS WITHOUT COMPLICATION, WITHOUT LONG-TERM CURRENT USE OF INSULIN: ICD-10-CM

## 2023-09-06 LAB
ALBUMIN SERPL BCP-MCNC: 3.8 G/DL (ref 3.5–5.2)
ANION GAP SERPL CALC-SCNC: 14 MMOL/L (ref 8–16)
BUN SERPL-MCNC: 33 MG/DL (ref 8–23)
CALCIUM SERPL-MCNC: 9.7 MG/DL (ref 8.7–10.5)
CHLORIDE SERPL-SCNC: 105 MMOL/L (ref 95–110)
CO2 SERPL-SCNC: 23 MMOL/L (ref 23–29)
CREAT SERPL-MCNC: 1.6 MG/DL (ref 0.5–1.4)
EST. GFR  (NO RACE VARIABLE): 30.4 ML/MIN/1.73 M^2
ESTIMATED AVG GLUCOSE: 134 MG/DL (ref 68–131)
GLUCOSE SERPL-MCNC: 108 MG/DL (ref 70–110)
HBA1C MFR BLD: 6.3 % (ref 4–5.6)
PHOSPHATE SERPL-MCNC: 4 MG/DL (ref 2.7–4.5)
POTASSIUM SERPL-SCNC: 4.1 MMOL/L (ref 3.5–5.1)
SODIUM SERPL-SCNC: 142 MMOL/L (ref 136–145)

## 2023-09-06 PROCEDURE — 83036 HEMOGLOBIN GLYCOSYLATED A1C: CPT | Mod: HCNC | Performed by: NURSE PRACTITIONER

## 2023-09-06 PROCEDURE — 36415 COLL VENOUS BLD VENIPUNCTURE: CPT | Mod: HCNC,PO | Performed by: INTERNAL MEDICINE

## 2023-09-06 PROCEDURE — 80069 RENAL FUNCTION PANEL: CPT | Mod: HCNC | Performed by: INTERNAL MEDICINE

## 2023-09-07 ENCOUNTER — PATIENT MESSAGE (OUTPATIENT)
Dept: PRIMARY CARE CLINIC | Facility: CLINIC | Age: 88
End: 2023-09-07
Payer: MEDICARE

## 2023-09-07 NOTE — ASSESSMENT & PLAN NOTE
Discussed goals of HTN mgmt, medical mgmt.   Declines EKG today.  Encouraged her to schedule follow up with cardiology.   She would like to do so after appt with nephrologist.

## 2023-09-11 ENCOUNTER — OFFICE VISIT (OUTPATIENT)
Dept: NEPHROLOGY | Facility: CLINIC | Age: 88
End: 2023-09-11
Payer: MEDICARE

## 2023-09-11 VITALS
HEIGHT: 64 IN | DIASTOLIC BLOOD PRESSURE: 72 MMHG | SYSTOLIC BLOOD PRESSURE: 130 MMHG | RESPIRATION RATE: 18 BRPM | HEART RATE: 102 BPM | BODY MASS INDEX: 29.99 KG/M2 | WEIGHT: 175.69 LBS

## 2023-09-11 DIAGNOSIS — N18.30 STAGE 3 CHRONIC KIDNEY DISEASE, UNSPECIFIED WHETHER STAGE 3A OR 3B CKD: Primary | ICD-10-CM

## 2023-09-11 PROCEDURE — 3288F FALL RISK ASSESSMENT DOCD: CPT | Mod: HCNC,CPTII,S$GLB, | Performed by: INTERNAL MEDICINE

## 2023-09-11 PROCEDURE — 1126F PR PAIN SEVERITY QUANTIFIED, NO PAIN PRESENT: ICD-10-PCS | Mod: HCNC,CPTII,S$GLB, | Performed by: INTERNAL MEDICINE

## 2023-09-11 PROCEDURE — 1101F PR PT FALLS ASSESS DOC 0-1 FALLS W/OUT INJ PAST YR: ICD-10-PCS | Mod: HCNC,CPTII,S$GLB, | Performed by: INTERNAL MEDICINE

## 2023-09-11 PROCEDURE — 1126F AMNT PAIN NOTED NONE PRSNT: CPT | Mod: HCNC,CPTII,S$GLB, | Performed by: INTERNAL MEDICINE

## 2023-09-11 PROCEDURE — 99215 OFFICE O/P EST HI 40 MIN: CPT | Mod: HCNC,S$GLB,, | Performed by: INTERNAL MEDICINE

## 2023-09-11 PROCEDURE — 1159F PR MEDICATION LIST DOCUMENTED IN MEDICAL RECORD: ICD-10-PCS | Mod: HCNC,CPTII,S$GLB, | Performed by: INTERNAL MEDICINE

## 2023-09-11 PROCEDURE — 3288F PR FALLS RISK ASSESSMENT DOCUMENTED: ICD-10-PCS | Mod: HCNC,CPTII,S$GLB, | Performed by: INTERNAL MEDICINE

## 2023-09-11 PROCEDURE — 99215 PR OFFICE/OUTPT VISIT, EST, LEVL V, 40-54 MIN: ICD-10-PCS | Mod: HCNC,S$GLB,, | Performed by: INTERNAL MEDICINE

## 2023-09-11 PROCEDURE — 99999 PR PBB SHADOW E&M-EST. PATIENT-LVL IV: ICD-10-PCS | Mod: PBBFAC,HCNC,, | Performed by: INTERNAL MEDICINE

## 2023-09-11 PROCEDURE — 99999 PR PBB SHADOW E&M-EST. PATIENT-LVL IV: CPT | Mod: PBBFAC,HCNC,, | Performed by: INTERNAL MEDICINE

## 2023-09-11 PROCEDURE — 1101F PT FALLS ASSESS-DOCD LE1/YR: CPT | Mod: HCNC,CPTII,S$GLB, | Performed by: INTERNAL MEDICINE

## 2023-09-11 PROCEDURE — 1159F MED LIST DOCD IN RCRD: CPT | Mod: HCNC,CPTII,S$GLB, | Performed by: INTERNAL MEDICINE

## 2023-09-11 NOTE — PROGRESS NOTES
Renal clinic f/u note:  Date of clinic visit: 9/11/23  Reason for f/u and chief c/o: CKD and HTN     HPI: Pt is a 89 y/o female with CKD stage 3 and h/o of HTN, who presents for f/u. Pt also has a h/o of CAD, CHF, and AF. Pt was last seen in renal clinic in May 2023. Pt has worsened s Cr, and seemed somewhat overdiuresed without any significant fluid gain. Lasix was reduced from 40 mg bid to 20 mg bid.     On f/u today, Pt has no new or acute c/o's, but is again concerned about her low BP. Her home BP measurements, which she showed me, show SBP in 80's on occasion. Pt now takes midodrine bid. Pt is also on metoprolol 25 mg and takes 1 and 1/2 tab bid for AF. She has no CP, no SOB, no leg swelling today. She says she is following closely with cardiology. She has a h/o of cardioablasion, but is back in AF. The question of a PM placement was discussed with her.        PAST MEDICAL HISTORY: CKD stage 3 bordering on stage 4, HTN, DM-2, diastolic CHF, atrial fibrillation, Arthritis, Basal cell carcinoma (03/29/2018), Colon polyps, Fuchs' corneal dystrophy, Glaucoma, Hyperlipidemia, Macular degeneration (dry), Obesity, PVD (peripheral vascular disease), Squamous cell carcinoma (01/08/2019), Stenosis of carotid artery (10/5/2021), Urinary tract infection.     PAST SURGICAL HISTORY:  She  has a past surgical history that includes Carpal tunnel release (Right, 2012 approx); left thumb (Left, 2011); Cataract extraction (Bilateral, 1994); Corneal transplant (Bilateral, 08/2015); SLT- OS (aka TRABECULOPLASTY) (Left, 05/11/2011); Breast cyst excision (Left, 1997 approx); Tonsillectomy (1938); Adenoidectomy (1938); Cholecystectomy (1975); Eye surgery; Eye surgery; Breast biopsy (Left); Reverse total shoulder arthroplasty (Left, 8/21/2018); Tenotomy (Left, 8/21/2018); Skin cancer excision (Left, 2017); Treatment of cardiac arrhythmia (N/A, 10/10/2019); and Treatment of cardiac arrhythmia (N/A, 12/6/2019).     SOCIAL HISTORY:  She   reports that she has never smoked. She has never used smokeless tobacco. She reports current alcohol use of about 2.0 standard drinks per week. She reports that she does not use drugs.     FAMILY MEDICAL HISTORY:  Her family history includes Cancer (age of onset: 88) in her father; Heart disease in her mother; Hypertension in her mother.               Review of patient's allergies indicates:   Allergen Reactions    Carvedilol Swelling       lips  lips  Other reaction(s): swelling    Cephalexin Other (See Comments)       Muscle/joint weakness unable to move     Doxycycline calcium Other (See Comments)       Weakness nausea   sob  Other reaction(s): weakness, nausea, SOB    Erythromycin Other (See Comments)       Complete weakness/could not walk    Gadolinium-containing contrast media Other (See Comments)       angioedema  Other reaction(s): Angioedema    Hydrocodone-acetaminophen         wheezing  wheezing  wheezing  Other reaction(s): wheezing    Inveltys [loteprednol etabonate] Palpitations and Other (See Comments)       Patient stated bp went up.    Labetalol Shortness Of Breath, Nausea Only and Other (See Comments)       Palpitations, LE weakness  Other reaction(s): SOB, palpitations, weakness    Loratadine Other (See Comments)       Double vision/spots  Other reaction(s): double vision    Macrolide antibiotics Other (See Comments)       Complete weakness/could not walk  Complete weakness/could not walk  Complete weakness/could not walk  Complete weakness/could not walk  Other reaction(s): complete weakness    Moxifloxacin Other (See Comments)       Hives   muscle soreness  Other reaction(s): hives, muscle soreness    Naproxen         wheezing  wheezing  wheezing  Other reaction(s): wheezing    Statins-hmg-coa reductase inhibitors Other (See Comments)       Severe Muscle weakness  Muscle weakness  Severe Muscle weakness  Other reaction(s): severe muscle weakness    Timolol Other (See Comments)       Severe muscle  weakness, eye problems.  Other reaction(s): severe muscle weakness    Verapamil Diarrhea       Severe diarrhea.  Other reaction(s): diarrhea    Amlodipine         Myalgias     Other reaction(s): myalgian    Doxycycline      Doxycycline hyclate         Other reaction(s): weakness, nausea, SOB    Fenofibrate         Causes muscle weakness  Other reaction(s): muscle weakness    Hydralazine         Other reaction(s): muscle tremors    Hydralazine analogues         Muscle tremors/aches       Hydrocortisone      Isosorbide         Tolerates Imdur    Loteprednol         Other reaction(s): increased BP    Tetanus and diphtheria toxoids      Adhesive Rash       Clonidine patch - 2 mfg - contact dermatitis    Codeine Diarrhea and Other (See Comments)       Loss of balance   Other reaction(s): loss of balance    Doxazosin Palpitations       Other reaction(s): palpitations    Fexofenadine Other (See Comments)       Double vision/spots   Other reaction(s): double vision    Hydrocortisone (bulk) Other (See Comments)       Only suppository/ caused muscle weakness    Latanoprost Other (See Comments)       Muscle weakness  Other reaction(s): muscle weakness    Olopatadine Other (See Comments)       Muscle weakness  Other reaction(s): muscle weakness    Prednisone Anxiety      Meds reviewed     Current Outpatient Medications:     acetaminophen (TYLENOL) 500 MG tablet, Take 500 mg by mouth nightly as needed., Disp: , Rfl:     ALPRAZolam (XANAX) 0.5 MG tablet, Take 1 tablet (0.5 mg total) by mouth nightly as needed for Anxiety., Disp: 30 tablet, Rfl: 5    apixaban (ELIQUIS) 2.5 mg Tab, Take 1 tablet (2.5 mg total) by mouth 2 (two) times daily., Disp: 180 tablet, Rfl: 3    b complex vitamins capsule, Take 1 capsule by mouth once daily., Disp: , Rfl:     cholecalciferol, vitamin D3, (VITAMIN D3) 50 mcg (2,000 unit) Cap, Take 1 capsule by mouth once daily., Disp: , Rfl:     coenzyme Q10 200 mg capsule, Take 200 mg by mouth once daily.,  "Disp: , Rfl:     fish oil-omega-3 fatty acids 300-1,000 mg capsule, Take 1 capsule by mouth once daily., Disp: , Rfl:     furosemide (LASIX) 20 MG tablet, Take 1 tablet (20 mg total) by mouth 2 (two) times a day., Disp: 180 tablet, Rfl: 1    metoprolol tartrate (LOPRESSOR) 25 MG tablet, Take 1.5 tablets (37.5 mg total) by mouth 2 (two) times daily., Disp: 180 tablet, Rfl: 3    midodrine (PROAMATINE) 5 MG Tab, Take 1-2 tablet 3 times daily for hypotension, Disp: 90 tablet, Rfl: 11    RHOPRESSA 0.02 % ophthalmic solution, PLACE 1 DROP INTO THE LEFT EYE EVERY EVENING., Disp: 2.5 mL, Rfl: 11    sars-cov-2, covid-19 pfizer omicron, (PFIZER COVID BIVAL,12Y UP,,PF,) 30 mcg/0.3 mL injection, Inject into the muscle., Disp: 0.3 mL, Rfl: 0    TRAVATAN Z 0.004 % ophthalmic solution, Place 1 drop into the left eye every evening., Disp: 2.5 mL, Rfl: 12    brinzolamide (AZOPT) 1 % ophthalmic suspension, Place 1 drop into the left eye 2 (two) times a day. (Patient not taking: Reported on 9/11/2023), Disp: 5 mL, Rfl: 3  No current facility-administered medications for this visit.           REVIEW OF SYSTEMS:  Patient has no fever, fatigue, visual changes, chest pain, edema, cough, dyspnea, nausea, vomiting, constipation, diarrhea, arthralgias, pruritis, dizziness, weakness, depression, confusion.      PHYSICAL EXAM:  Blood pressure 130/72, pulse 102, resp. rate 18, height 5' 4" (1.626 m), weight 79.2 Kg, from 79.5 kg   SBP's at home 80's (per pt's records)  Gen:    WDWN female in no apparent distress  Psych: Normal mood and affect  Skin:    No rashes or ulcers  Neck:   No JVD  Chest:  Clear with no rales, rhonchi, wheezing with normal effort  CV:      Irregular, no murmurs, gallops or rubs, mild tachycardia  Abd:     Soft, nontender, no distension  Ext:      No edema     Labs reviewed:  BMP  Lab Results   Component Value Date     09/06/2023    K 4.1 09/06/2023     09/06/2023    CO2 23 09/06/2023    BUN 33 (H) 09/06/2023 "    CREATININE 1.6 (H) 09/06/2023    CALCIUM 9.7 09/06/2023    ANIONGAP 14 09/06/2023    EGFRNORACEVR 30.4 (A) 09/06/2023     Lab Results   Component Value Date    WBC 7.00 07/28/2023    HGB 12.2 07/28/2023    HCT 37.4 07/28/2023     (H) 07/28/2023     07/28/2023       Lab Results   Component Value Date    .2 (H) 04/27/2023    CALCIUM 9.7 09/06/2023    PHOS 4.0 09/06/2023        HgA1c 6.3 %  U/a: no protein, no blood, no casts  Urine p/Cr 130, from 180 mg        IMPRESSION AND RECOMMENDATIONS: 89 y/o female with CKD prior stage 3 presents for f/u     Renal: s Cr stable and improved after lowering lasix dose,   Cr has some fluctuations due to low BP and AF (prerenal azotemia)  Pt is an elderly, likely has significant vascular disease  With BP being kept so low, there is likely reduced end organ perfusion, causing rise in Cr with typical fluctuations.  Pt was overdiuresed.  No significant fluid gain  Continue low dose lasix    CKD stage 3, bordering on stage 4  H/o of DM and HTN  Microalbuminuria, likely diabetic nephropathy. Is on losartan     Complications of CKD:  K normal  Na normal  Acid base no issues  Ca normal  PO4 normal  Secondary hyperparathyroidism, mild  Not anemic  Fluid status, slightly vascularly hypovolemic (as above)     2. HTN: BP controlled to low  Medes reviewed  Discussed in details with pt, will lower lasix dose as above     3. Cardiac: cardiac issues reviewed with pt  H/o of diastolic CHF. Well compensated  H/o of AF. Mild tachycardia today  asymptomatic  TSH and free T4 normal    To cardiology: if a PM can be placed, pt's reliance on metoprolol will be reduced. That will lead to less hypotension and reduced need to use midodrine.  All above will lead to more stable renal perfusion and more steady renal function.  Pt does have cardiology appt coming up.       Plans and recommendations:  As discussed above  Total time spent 40 minutes including time needed to review the  records, the   patient evaluation, documentation, face-to-face discussion with the patient,   more than 50% of the time was spent on coordination of care and counseling.    Level V visit.  RTC 6 months     Paulie Newman MD

## 2023-09-19 ENCOUNTER — OFFICE VISIT (OUTPATIENT)
Dept: PRIMARY CARE CLINIC | Facility: CLINIC | Age: 88
End: 2023-09-19
Payer: MEDICARE

## 2023-09-19 VITALS
HEART RATE: 107 BPM | BODY MASS INDEX: 30.43 KG/M2 | WEIGHT: 177.31 LBS | OXYGEN SATURATION: 95 % | TEMPERATURE: 99 F | SYSTOLIC BLOOD PRESSURE: 122 MMHG | DIASTOLIC BLOOD PRESSURE: 70 MMHG

## 2023-09-19 DIAGNOSIS — I48.19 PERSISTENT ATRIAL FIBRILLATION: ICD-10-CM

## 2023-09-19 DIAGNOSIS — Z23 ENCOUNTER FOR IMMUNIZATION: Primary | ICD-10-CM

## 2023-09-19 PROCEDURE — 1159F MED LIST DOCD IN RCRD: CPT | Mod: HCNC,CPTII,S$GLB, | Performed by: NURSE PRACTITIONER

## 2023-09-19 PROCEDURE — 1159F PR MEDICATION LIST DOCUMENTED IN MEDICAL RECORD: ICD-10-PCS | Mod: HCNC,CPTII,S$GLB, | Performed by: NURSE PRACTITIONER

## 2023-09-19 PROCEDURE — 1125F PR PAIN SEVERITY QUANTIFIED, PAIN PRESENT: ICD-10-PCS | Mod: HCNC,CPTII,S$GLB, | Performed by: NURSE PRACTITIONER

## 2023-09-19 PROCEDURE — 99999 PR PBB SHADOW E&M-EST. PATIENT-LVL IV: CPT | Mod: PBBFAC,HCNC,, | Performed by: NURSE PRACTITIONER

## 2023-09-19 PROCEDURE — 90677 PNEUMOCOCCAL CONJUGATE VACCINE 20-VALENT: ICD-10-PCS | Mod: HCNC,S$GLB,, | Performed by: INTERNAL MEDICINE

## 2023-09-19 PROCEDURE — 1101F PR PT FALLS ASSESS DOC 0-1 FALLS W/OUT INJ PAST YR: ICD-10-PCS | Mod: HCNC,CPTII,S$GLB, | Performed by: NURSE PRACTITIONER

## 2023-09-19 PROCEDURE — G0009 PNEUMOCOCCAL CONJUGATE VACCINE 20-VALENT: ICD-10-PCS | Mod: HCNC,S$GLB,, | Performed by: INTERNAL MEDICINE

## 2023-09-19 PROCEDURE — 99214 PR OFFICE/OUTPT VISIT, EST, LEVL IV, 30-39 MIN: ICD-10-PCS | Mod: HCNC,S$GLB,, | Performed by: NURSE PRACTITIONER

## 2023-09-19 PROCEDURE — 3288F FALL RISK ASSESSMENT DOCD: CPT | Mod: HCNC,CPTII,S$GLB, | Performed by: NURSE PRACTITIONER

## 2023-09-19 PROCEDURE — 99214 OFFICE O/P EST MOD 30 MIN: CPT | Mod: HCNC,S$GLB,, | Performed by: NURSE PRACTITIONER

## 2023-09-19 PROCEDURE — 90677 PCV20 VACCINE IM: CPT | Mod: HCNC,S$GLB,, | Performed by: INTERNAL MEDICINE

## 2023-09-19 PROCEDURE — 1101F PT FALLS ASSESS-DOCD LE1/YR: CPT | Mod: HCNC,CPTII,S$GLB, | Performed by: NURSE PRACTITIONER

## 2023-09-19 PROCEDURE — G0009 ADMIN PNEUMOCOCCAL VACCINE: HCPCS | Mod: HCNC,S$GLB,, | Performed by: INTERNAL MEDICINE

## 2023-09-19 PROCEDURE — 99999 PR PBB SHADOW E&M-EST. PATIENT-LVL IV: ICD-10-PCS | Mod: PBBFAC,HCNC,, | Performed by: NURSE PRACTITIONER

## 2023-09-19 PROCEDURE — 1125F AMNT PAIN NOTED PAIN PRSNT: CPT | Mod: HCNC,CPTII,S$GLB, | Performed by: NURSE PRACTITIONER

## 2023-09-19 PROCEDURE — 3288F PR FALLS RISK ASSESSMENT DOCUMENTED: ICD-10-PCS | Mod: HCNC,CPTII,S$GLB, | Performed by: NURSE PRACTITIONER

## 2023-09-19 NOTE — Clinical Note
Good afternoon - I'm seeing Ms Rg this afternoon for routine follow up. She is having intermittent episodes of palpitations that last about 15 minutes during which time she feels weak. HR and BP are unchanged during these episodes.  Her HR is consistently in low 100s. SBP low 100s typically. She takes 37.5 mg metoprolol tartrate BID. She previously took 50 mg BID but HR would drop in 40s and she was symptomatic.  Today no dyspnea or RUIZ, lungs are clear and minimal edema.  In Dr Newman's note earlier this month he inquired about a pacemaker.  Do you have additional recommendations?  Thank you Heather

## 2023-09-19 NOTE — PROGRESS NOTES
Shirley Metz  09/19/2023  [id number]    Phylicia Deleon MD  Patient Care Team:  Phylicia Deleon MD as PCP - General (Internal Medicine)  Wilbert Ware MD as Consulting Physician (Ophthalmology)  Rajinder Quintanilla MD as Consulting Physician (Ophthalmology)  Rosario Valadez MD as Consulting Physician (Dermatology)  Amish Mendoza MD (Pulmonary Disease)  Jaquan Choi MD as Consulting Physician (Vascular Surgery)  Emmanuel Fish MD as Consulting Physician (Hand Surgery)  PAXTON Chaidez Jr., MD as Consulting Physician (Orthopedic Surgery)  Phylicia Deleon MD as Physician (Internal Medicine)  Heather Miller NP as Nurse Practitioner (Family Medicine)      [facility] Note      Chief Complaint:  Chief Complaint   Patient presents with    Follow-up       History of Present Illness:  HPI    F/U Afib, dyspnea, HTN.    Now having some palpitations, this is new. HR consistently in low 100s.  No change in HR and BP when she is having palpitations. She is having intermittent episodes of palpitations that last about 15 minutes during which time she feels weak. SBP low 100s typically. Taking midodrine sparingly now. She would like to consider a pacemaker.     No dyspnea. No RUIZ. No CP.       Cardiology:   09/01/2023 the pleasant patient with known diastolic heart dysfunction, lymphedema hypotension due to orthostatic hypotension on midodrine.  With PAF and on chronic anticoagulation and metoprolol for rate control.  Ablation in the past which has not been successful.  Will do a monitor to see if she is having tachy-ed syndrome and potentially could need a pacemaker otherwise stable this time.  She taking diuretics as necessary for peripheral now better understanding of overall situation.        Review of Systems   Constitutional:  Negative for activity change and appetite change.   Respiratory:  Negative for chest tightness.    Cardiovascular:  Positive for  palpitations and leg swelling. Negative for chest pain.   Gastrointestinal:  Negative for abdominal pain, constipation, diarrhea and nausea.   Genitourinary:  Negative for dysuria.         The following were reviewed: Active problem list, medication list, allergies, family history, social history, and Health Maintenance.       Medications:  Current Outpatient Medications on File Prior to Visit   Medication Sig Dispense Refill    acetaminophen (TYLENOL) 500 MG tablet Take 500 mg by mouth nightly as needed.      ALPRAZolam (XANAX) 0.5 MG tablet Take 1 tablet (0.5 mg total) by mouth nightly as needed for Anxiety. 30 tablet 5    apixaban (ELIQUIS) 2.5 mg Tab Take 1 tablet (2.5 mg total) by mouth 2 (two) times daily. 180 tablet 3    b complex vitamins capsule Take 1 capsule by mouth once daily.      cholecalciferol, vitamin D3, (VITAMIN D3) 50 mcg (2,000 unit) Cap Take 1 capsule by mouth once daily.      coenzyme Q10 200 mg capsule Take 200 mg by mouth once daily.      fish oil-omega-3 fatty acids 300-1,000 mg capsule Take 1 capsule by mouth once daily.      furosemide (LASIX) 20 MG tablet Take 1 tablet (20 mg total) by mouth 2 (two) times a day. 180 tablet 1    metoprolol tartrate (LOPRESSOR) 25 MG tablet Take 1.5 tablets (37.5 mg total) by mouth 2 (two) times daily. 180 tablet 3    midodrine (PROAMATINE) 5 MG Tab Take 1-2 tablet 3 times daily for hypotension 90 tablet 11    RHOPRESSA 0.02 % ophthalmic solution PLACE 1 DROP INTO THE LEFT EYE EVERY EVENING. 2.5 mL 11    TRAVATAN Z 0.004 % ophthalmic solution Place 1 drop into the left eye every evening. 2.5 mL 12    brinzolamide (AZOPT) 1 % ophthalmic suspension Place 1 drop into the left eye 2 (two) times a day. (Patient not taking: Reported on 9/11/2023) 5 mL 3    sars-cov-2, covid-19 pfizer omicron, (PFIZER COVID BIVAL,12Y UP,,PF,) 30 mcg/0.3 mL injection Inject into the muscle. 0.3 mL 0    [DISCONTINUED] cloNIDine (CATAPRES) 0.1 MG tablet Take 1 tablet (0.1 mg total)  by mouth 2 (two) times daily as needed (systolic BP over 180). 20 tablet 1    [DISCONTINUED] isosorbide mononitrate (IMDUR) 30 MG 24 hr tablet Take 1 tablet (30 mg total) by mouth every evening. 90 tablet 1     Current Facility-Administered Medications on File Prior to Visit   Medication Dose Route Frequency Provider Last Rate Last Admin    sodium chloride 0.9% flush 3 mL  3 mL Intravenous PRN Steve Galarza PA-C        vancomycin in dextrose 5 % 1 gram/250 mL IVPB 1,000 mg  1,000 mg Intravenous On Call Procedure Vianney Fountain NP           Medications have been reviewed and reconciled with patient at visit today.    Barriers to medications present (no )    Fall since last office visit (no )      Exam:  Vitals:    09/19/23 1413   BP: 122/70   Pulse: 107   Temp: 98.5 °F (36.9 °C)     Weight: 80.4 kg (177 lb 4.8 oz)   Body mass index is 30.43 kg/m².      BP Readings from Last 3 Encounters:   09/19/23 122/70   09/11/23 130/72   09/01/23 100/70     Wt Readings from Last 3 Encounters:   09/19/23 1413 80.4 kg (177 lb 4.8 oz)   09/11/23 1042 79.7 kg (175 lb 11.3 oz)   09/01/23 1321 79.2 kg (174 lb 9.7 oz)            Physical Exam  Constitutional:       General: She is not in acute distress.  HENT:      Mouth/Throat:      Mouth: Mucous membranes are moist.   Eyes:      General: No scleral icterus.  Cardiovascular:      Rate and Rhythm: Tachycardia present. Rhythm irregular.   Pulmonary:      Effort: Pulmonary effort is normal.      Breath sounds: Normal breath sounds.   Abdominal:      General: There is no distension.      Palpations: Abdomen is soft.      Tenderness: There is no abdominal tenderness.   Musculoskeletal:         General: Swelling (trace BLE edema) present.   Skin:     General: Skin is warm and dry.   Neurological:      Mental Status: She is alert. Mental status is at baseline.   Psychiatric:         Mood and Affect: Mood normal.         Behavior: Behavior normal.         Laboratory Reviewed:  (Yes)  Lab Results   Component Value Date    WBC 7.00 07/28/2023    HGB 12.2 07/28/2023    HCT 37.4 07/28/2023     07/28/2023    CHOL 170 08/04/2023    TRIG 116 08/04/2023    HDL 40 08/04/2023    ALT 14 07/28/2023    AST 19 07/28/2023     09/06/2023    K 4.1 09/06/2023     09/06/2023    CREATININE 1.6 (H) 09/06/2023    BUN 33 (H) 09/06/2023    CO2 23 09/06/2023    TSH 3.577 04/27/2023    INR 1.1 10/25/2022    HGBA1C 6.3 (H) 09/06/2023           Health Maintenance  Health Maintenance Topics with due status: Not Due       Topic Last Completion Date    Diabetes Urine Screening 03/17/2023    Lipid Panel 08/04/2023    Hemoglobin A1c 09/06/2023     Health Maintenance Due   Topic Date Due    Shingles Vaccine (1 of 2) Never done    Eye Exam  11/06/2021    TETANUS VACCINE  11/09/2021    COVID-19 Vaccine (5 - Pfizer risk series) 04/12/2023    Influenza Vaccine (1) 09/01/2023         Assessment:  Problem List Items Addressed This Visit          Cardiac/Vascular    Persistent atrial fibrillation     Difficulty with rate control.   Message sent to Dr Knutson.   Pt will schedule an appt to follow up with cardiology to discuss further.           Other Visit Diagnoses       Encounter for immunization    -  Primary    Relevant Orders    (In Office Administered) Pneumococcal Conjugate Vaccine (20 Valent) (IM) (Completed)              Plan:  Encounter for immunization  -     (In Office Administered) Pneumococcal Conjugate Vaccine (20 Valent) (IM)    Persistent atrial fibrillation      -Patient's lab results were reviewed and discussed with patient  -Treatment options and alternatives were discussed with the patient. Patient expressed understanding. Patient was given the opportunity to ask questions and be an active participant in their medical care. Patient had no further questions or concerns at this time.   -Documentation of patient's health and condition was obtained from family member who was present during  visit.  -Patient is an overall moderate risk for health complications from their medical conditions.       Follow up: Follow up in about 4 weeks (around 10/17/2023).      After visit summary printed and given to patient upon discharge.  Patient goals and care plan are included in After visit summary.    Total medical decision making time was 38 min.  The following issues were discussed: The primary encounter diagnosis was Encounter for immunization. A diagnosis of Persistent atrial fibrillation was also pertinent to this visit.    Health maintenance needs, recent test results and goals of care discussed with pt and questions answered.

## 2023-09-22 ENCOUNTER — OFFICE VISIT (OUTPATIENT)
Dept: CARDIOLOGY | Facility: CLINIC | Age: 88
End: 2023-09-22
Payer: MEDICARE

## 2023-09-22 VITALS
SYSTOLIC BLOOD PRESSURE: 108 MMHG | DIASTOLIC BLOOD PRESSURE: 70 MMHG | OXYGEN SATURATION: 95 % | BODY MASS INDEX: 30.45 KG/M2 | HEART RATE: 83 BPM | WEIGHT: 178.38 LBS | HEIGHT: 64 IN

## 2023-09-22 DIAGNOSIS — N19 HYPERTENSIVE HEART AND RENAL DISEASE WITH BOTH (CONGESTIVE) HEART FAILURE AND RENAL FAILURE: ICD-10-CM

## 2023-09-22 DIAGNOSIS — I51.3 THROMBUS OF LEFT ATRIAL APPENDAGE: ICD-10-CM

## 2023-09-22 DIAGNOSIS — I73.9 PAD (PERIPHERAL ARTERY DISEASE): ICD-10-CM

## 2023-09-22 DIAGNOSIS — I48.0 PAF (PAROXYSMAL ATRIAL FIBRILLATION): ICD-10-CM

## 2023-09-22 DIAGNOSIS — I25.84 CORONARY ARTERY CALCIFICATION: ICD-10-CM

## 2023-09-22 DIAGNOSIS — I65.29 STENOSIS OF CAROTID ARTERY, UNSPECIFIED LATERALITY: ICD-10-CM

## 2023-09-22 DIAGNOSIS — I25.10 CORONARY ARTERY CALCIFICATION: ICD-10-CM

## 2023-09-22 DIAGNOSIS — Z86.79 S/P ABLATION OF ATRIAL FIBRILLATION: ICD-10-CM

## 2023-09-22 DIAGNOSIS — I48.92 ATRIAL FLUTTER WITH RAPID VENTRICULAR RESPONSE: Primary | ICD-10-CM

## 2023-09-22 DIAGNOSIS — I48.0 PAROXYSMAL ATRIAL FIBRILLATION: ICD-10-CM

## 2023-09-22 DIAGNOSIS — Z98.890 S/P ABLATION OF ATRIAL FIBRILLATION: ICD-10-CM

## 2023-09-22 DIAGNOSIS — E78.2 MIXED HYPERLIPIDEMIA: ICD-10-CM

## 2023-09-22 DIAGNOSIS — I13.2 HYPERTENSIVE HEART AND RENAL DISEASE WITH BOTH (CONGESTIVE) HEART FAILURE AND RENAL FAILURE: ICD-10-CM

## 2023-09-22 PROCEDURE — 1101F PT FALLS ASSESS-DOCD LE1/YR: CPT | Mod: HCNC,CPTII,S$GLB, | Performed by: INTERNAL MEDICINE

## 2023-09-22 PROCEDURE — 3288F PR FALLS RISK ASSESSMENT DOCUMENTED: ICD-10-PCS | Mod: HCNC,CPTII,S$GLB, | Performed by: INTERNAL MEDICINE

## 2023-09-22 PROCEDURE — 1160F PR REVIEW ALL MEDS BY PRESCRIBER/CLIN PHARMACIST DOCUMENTED: ICD-10-PCS | Mod: HCNC,CPTII,S$GLB, | Performed by: INTERNAL MEDICINE

## 2023-09-22 PROCEDURE — 3288F FALL RISK ASSESSMENT DOCD: CPT | Mod: HCNC,CPTII,S$GLB, | Performed by: INTERNAL MEDICINE

## 2023-09-22 PROCEDURE — 99999 PR PBB SHADOW E&M-EST. PATIENT-LVL IV: ICD-10-PCS | Mod: PBBFAC,HCNC,, | Performed by: INTERNAL MEDICINE

## 2023-09-22 PROCEDURE — 1159F PR MEDICATION LIST DOCUMENTED IN MEDICAL RECORD: ICD-10-PCS | Mod: HCNC,CPTII,S$GLB, | Performed by: INTERNAL MEDICINE

## 2023-09-22 PROCEDURE — 1160F RVW MEDS BY RX/DR IN RCRD: CPT | Mod: HCNC,CPTII,S$GLB, | Performed by: INTERNAL MEDICINE

## 2023-09-22 PROCEDURE — 99214 OFFICE O/P EST MOD 30 MIN: CPT | Mod: HCNC,S$GLB,, | Performed by: INTERNAL MEDICINE

## 2023-09-22 PROCEDURE — 1101F PR PT FALLS ASSESS DOC 0-1 FALLS W/OUT INJ PAST YR: ICD-10-PCS | Mod: HCNC,CPTII,S$GLB, | Performed by: INTERNAL MEDICINE

## 2023-09-22 PROCEDURE — 99214 PR OFFICE/OUTPT VISIT, EST, LEVL IV, 30-39 MIN: ICD-10-PCS | Mod: HCNC,S$GLB,, | Performed by: INTERNAL MEDICINE

## 2023-09-22 PROCEDURE — 1126F PR PAIN SEVERITY QUANTIFIED, NO PAIN PRESENT: ICD-10-PCS | Mod: HCNC,CPTII,S$GLB, | Performed by: INTERNAL MEDICINE

## 2023-09-22 PROCEDURE — 99999 PR PBB SHADOW E&M-EST. PATIENT-LVL IV: CPT | Mod: PBBFAC,HCNC,, | Performed by: INTERNAL MEDICINE

## 2023-09-22 PROCEDURE — 1159F MED LIST DOCD IN RCRD: CPT | Mod: HCNC,CPTII,S$GLB, | Performed by: INTERNAL MEDICINE

## 2023-09-22 PROCEDURE — 1126F AMNT PAIN NOTED NONE PRSNT: CPT | Mod: HCNC,CPTII,S$GLB, | Performed by: INTERNAL MEDICINE
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
## 2023-09-25 ENCOUNTER — HOSPITAL ENCOUNTER (OUTPATIENT)
Dept: CARDIOLOGY | Facility: HOSPITAL | Age: 88
Discharge: HOME | End: 2023-09-25
Attending: INTERNAL MEDICINE
Payer: MEDICARE

## 2023-09-25 DIAGNOSIS — I48.92 ATRIAL FLUTTER WITH RAPID VENTRICULAR RESPONSE: ICD-10-CM

## 2023-09-25 PROCEDURE — 93226 XTRNL ECG REC<48 HR SCAN A/R: CPT | Mod: HCNC

## 2023-09-25 PROCEDURE — 93227 XTRNL ECG REC<48 HR R&I: CPT | Mod: HCNC,,, | Performed by: INTERNAL MEDICINE

## 2023-09-25 PROCEDURE — 93227 HOLTER MONITOR - 48 HOUR (CUPID ONLY): ICD-10-PCS | Mod: HCNC,,, | Performed by: INTERNAL MEDICINE

## 2023-09-29 LAB
OHS CV EVENT MONITOR DAY: 0
OHS CV HOLTER LENGTH DECIMAL HOURS: 47.98
OHS CV HOLTER LENGTH HOURS: 47
OHS CV HOLTER LENGTH MINUTES: 59
OHS CV HOLTER SINUS AVERAGE HR: 106
OHS CV HOLTER SINUS MAX HR: 146
OHS CV HOLTER SINUS MIN HR: 69

## 2023-10-04 ENCOUNTER — OFFICE VISIT (OUTPATIENT)
Dept: PRIMARY CARE CLINIC | Facility: CLINIC | Age: 88
End: 2023-10-04
Payer: MEDICARE

## 2023-10-04 VITALS
SYSTOLIC BLOOD PRESSURE: 112 MMHG | OXYGEN SATURATION: 96 % | DIASTOLIC BLOOD PRESSURE: 60 MMHG | HEART RATE: 94 BPM | WEIGHT: 181.19 LBS | BODY MASS INDEX: 31.1 KG/M2

## 2023-10-04 DIAGNOSIS — E11.22 TYPE 2 DIABETES MELLITUS WITH STAGE 4 CHRONIC KIDNEY DISEASE, WITHOUT LONG-TERM CURRENT USE OF INSULIN: ICD-10-CM

## 2023-10-04 DIAGNOSIS — R60.9 EDEMA, UNSPECIFIED TYPE: ICD-10-CM

## 2023-10-04 DIAGNOSIS — I13.2 HYPERTENSIVE HEART AND RENAL DISEASE WITH BOTH (CONGESTIVE) HEART FAILURE AND RENAL FAILURE: ICD-10-CM

## 2023-10-04 DIAGNOSIS — I48.19 PERSISTENT ATRIAL FIBRILLATION: Primary | ICD-10-CM

## 2023-10-04 DIAGNOSIS — N18.4 TYPE 2 DIABETES MELLITUS WITH STAGE 4 CHRONIC KIDNEY DISEASE, WITHOUT LONG-TERM CURRENT USE OF INSULIN: ICD-10-CM

## 2023-10-04 DIAGNOSIS — E21.3 HYPERPARATHYROIDISM: ICD-10-CM

## 2023-10-04 DIAGNOSIS — N19 HYPERTENSIVE HEART AND RENAL DISEASE WITH BOTH (CONGESTIVE) HEART FAILURE AND RENAL FAILURE: ICD-10-CM

## 2023-10-04 PROCEDURE — 90694 VACC AIIV4 NO PRSRV 0.5ML IM: CPT | Mod: HCNC,S$GLB,, | Performed by: INTERNAL MEDICINE

## 2023-10-04 PROCEDURE — 99214 PR OFFICE/OUTPT VISIT, EST, LEVL IV, 30-39 MIN: ICD-10-PCS | Mod: HCNC,S$GLB,, | Performed by: NURSE PRACTITIONER

## 2023-10-04 PROCEDURE — 3288F FALL RISK ASSESSMENT DOCD: CPT | Mod: HCNC,CPTII,S$GLB, | Performed by: NURSE PRACTITIONER

## 2023-10-04 PROCEDURE — 99999 PR PBB SHADOW E&M-EST. PATIENT-LVL III: CPT | Mod: PBBFAC,HCNC,, | Performed by: NURSE PRACTITIONER

## 2023-10-04 PROCEDURE — 1126F PR PAIN SEVERITY QUANTIFIED, NO PAIN PRESENT: ICD-10-PCS | Mod: HCNC,CPTII,S$GLB, | Performed by: NURSE PRACTITIONER

## 2023-10-04 PROCEDURE — 1159F PR MEDICATION LIST DOCUMENTED IN MEDICAL RECORD: ICD-10-PCS | Mod: HCNC,CPTII,S$GLB, | Performed by: NURSE PRACTITIONER

## 2023-10-04 PROCEDURE — 1159F MED LIST DOCD IN RCRD: CPT | Mod: HCNC,CPTII,S$GLB, | Performed by: NURSE PRACTITIONER

## 2023-10-04 PROCEDURE — G0008 FLU VACCINE - QUADRIVALENT - ADJUVANTED: ICD-10-PCS | Mod: HCNC,S$GLB,, | Performed by: INTERNAL MEDICINE

## 2023-10-04 PROCEDURE — 1126F AMNT PAIN NOTED NONE PRSNT: CPT | Mod: HCNC,CPTII,S$GLB, | Performed by: NURSE PRACTITIONER

## 2023-10-04 PROCEDURE — 3288F PR FALLS RISK ASSESSMENT DOCUMENTED: ICD-10-PCS | Mod: HCNC,CPTII,S$GLB, | Performed by: NURSE PRACTITIONER

## 2023-10-04 PROCEDURE — G0008 ADMIN INFLUENZA VIRUS VAC: HCPCS | Mod: HCNC,S$GLB,, | Performed by: INTERNAL MEDICINE

## 2023-10-04 PROCEDURE — 99214 OFFICE O/P EST MOD 30 MIN: CPT | Mod: HCNC,S$GLB,, | Performed by: NURSE PRACTITIONER

## 2023-10-04 PROCEDURE — 1101F PR PT FALLS ASSESS DOC 0-1 FALLS W/OUT INJ PAST YR: ICD-10-PCS | Mod: HCNC,CPTII,S$GLB, | Performed by: NURSE PRACTITIONER

## 2023-10-04 PROCEDURE — 99999 PR PBB SHADOW E&M-EST. PATIENT-LVL III: ICD-10-PCS | Mod: PBBFAC,HCNC,, | Performed by: NURSE PRACTITIONER

## 2023-10-04 PROCEDURE — 90694 FLU VACCINE - QUADRIVALENT - ADJUVANTED: ICD-10-PCS | Mod: HCNC,S$GLB,, | Performed by: INTERNAL MEDICINE

## 2023-10-04 PROCEDURE — 1101F PT FALLS ASSESS-DOCD LE1/YR: CPT | Mod: HCNC,CPTII,S$GLB, | Performed by: NURSE PRACTITIONER

## 2023-10-04 RX ORDER — METOLAZONE 2.5 MG/1
2.5 TABLET ORAL DAILY PRN
Qty: 30 TABLET | Refills: 2 | Status: SHIPPED | OUTPATIENT
Start: 2023-10-04 | End: 2024-01-02

## 2023-10-04 NOTE — ASSESSMENT & PLAN NOTE
Lab Results   Component Value Date    .2 (H) 04/27/2023    CALCIUM 9.7 09/06/2023    PHOS 4.0 09/06/2023   Monitor

## 2023-10-04 NOTE — PROGRESS NOTES
"Shirley Metz  10/04/2023  [id number]    Phylicia Deleon MD  Patient Care Team:  Phylicia Deleon MD as PCP - General (Internal Medicine)  Wilbert Ware MD as Consulting Physician (Ophthalmology)  Rajinder Quintanilla MD as Consulting Physician (Ophthalmology)  Rosario Valadez MD as Consulting Physician (Dermatology)  Amish Mendoza MD (Pulmonary Disease)  Jaquan Choi MD as Consulting Physician (Vascular Surgery)  Emmanuel Fish MD as Consulting Physician (Hand Surgery)  PAXTON Chaidez Jr., MD as Consulting Physician (Orthopedic Surgery)  Phylicia Deleon MD as Physician (Internal Medicine)  Heather Miller NP as Nurse Practitioner (Family Medicine)      [facility] Note      Chief Complaint:  Chief Complaint   Patient presents with    Follow-up       History of Present Illness:  HPI    Ankle and trunk edema intermittently for the past two weeks.   Taking lasix 40 mg q AM and 20-40 mg q PM the past 4 days.   Taking metoprolol 25 mg 1.5 tabs BID.  Has not taken metolazone.   Gaining weight the past two weeks also.   No increased dyspnea at rest or with exertion.   No cough or congestion.   Having mild palpitations, this is new during the past three weeks.   She would like to discuss Holter monitor.     Avg home /65, home HR typically 105 bpm, no low readings, not over 125 bpm. 3 lb weight gain in past two weeks in clinic.     Appt with cardiology next week.     Recent "arthritis attack." AM stiffness, hands/back. Works out after about an hour. Better in afternoons.       Review of Systems   Constitutional:  Negative for activity change, appetite change and fatigue.   HENT:  Negative for congestion.    Respiratory:  Negative for cough, chest tightness and shortness of breath.    Cardiovascular:  Positive for palpitations and leg swelling. Negative for chest pain.   Gastrointestinal:  Negative for nausea and vomiting.   Genitourinary:  Negative for " dysuria.   Neurological:  Negative for dizziness.         The following were reviewed: Active problem list, medication list, allergies, family history, social history, and Health Maintenance.       Medications:  Current Outpatient Medications on File Prior to Visit   Medication Sig Dispense Refill    acetaminophen (TYLENOL) 500 MG tablet Take 500 mg by mouth nightly as needed.      ALPRAZolam (XANAX) 0.5 MG tablet Take 1 tablet (0.5 mg total) by mouth nightly as needed for Anxiety. 30 tablet 5    apixaban (ELIQUIS) 2.5 mg Tab Take 1 tablet (2.5 mg total) by mouth 2 (two) times daily. 180 tablet 3    b complex vitamins capsule Take 1 capsule by mouth once daily.      cholecalciferol, vitamin D3, (VITAMIN D3) 50 mcg (2,000 unit) Cap Take 1 capsule by mouth once daily.      coenzyme Q10 200 mg capsule Take 200 mg by mouth once daily.      fish oil-omega-3 fatty acids 300-1,000 mg capsule Take 1 capsule by mouth once daily.      furosemide (LASIX) 20 MG tablet Take 1 tablet (20 mg total) by mouth 2 (two) times a day. 180 tablet 1    metoprolol tartrate (LOPRESSOR) 25 MG tablet Take 1.5 tablets (37.5 mg total) by mouth 2 (two) times daily. 180 tablet 3    RHOPRESSA 0.02 % ophthalmic solution PLACE 1 DROP INTO THE LEFT EYE EVERY EVENING. 2.5 mL 11    TRAVATAN Z 0.004 % ophthalmic solution Place 1 drop into the left eye every evening. 2.5 mL 12    brinzolamide (AZOPT) 1 % ophthalmic suspension Place 1 drop into the left eye 2 (two) times a day. (Patient not taking: Reported on 9/11/2023) 5 mL 3    sars-cov-2, covid-19 pfizer omicron, (PFIZER COVID BIVAL,12Y UP,,PF,) 30 mcg/0.3 mL injection Inject into the muscle. 0.3 mL 0    [DISCONTINUED] cloNIDine (CATAPRES) 0.1 MG tablet Take 1 tablet (0.1 mg total) by mouth 2 (two) times daily as needed (systolic BP over 180). 20 tablet 1    [DISCONTINUED] isosorbide mononitrate (IMDUR) 30 MG 24 hr tablet Take 1 tablet (30 mg total) by mouth every evening. 90 tablet 1    [DISCONTINUED]  midodrine (PROAMATINE) 5 MG Tab Take 1-2 tablet 3 times daily for hypotension (Patient not taking: Reported on 10/4/2023) 90 tablet 11     Current Facility-Administered Medications on File Prior to Visit   Medication Dose Route Frequency Provider Last Rate Last Admin    sodium chloride 0.9% flush 3 mL  3 mL Intravenous PRN Steve Galarza PA-C        vancomycin in dextrose 5 % 1 gram/250 mL IVPB 1,000 mg  1,000 mg Intravenous On Call Procedure Vianney Fountain NP           Medications have been reviewed and reconciled with patient at visit today.    Barriers to medications present (no )    Fall since last office visit (no )      Exam:  Vitals:    10/04/23 0943   BP: 112/60   Pulse: 94     Weight: 82.2 kg (181 lb 3.2 oz)   Body mass index is 31.1 kg/m².      BP Readings from Last 3 Encounters:   10/04/23 112/60   09/22/23 108/70   09/19/23 122/70     Wt Readings from Last 3 Encounters:   10/04/23 0943 82.2 kg (181 lb 3.2 oz)   09/22/23 0924 80.9 kg (178 lb 5.6 oz)   09/19/23 1413 80.4 kg (177 lb 4.8 oz)            Physical Exam  Constitutional:       General: She is not in acute distress.     Appearance: She is not ill-appearing.   HENT:      Mouth/Throat:      Mouth: Mucous membranes are moist.   Eyes:      General: No scleral icterus.  Cardiovascular:      Rate and Rhythm: Normal rate. Rhythm irregular.   Pulmonary:      Effort: Pulmonary effort is normal.      Breath sounds: Normal breath sounds.   Abdominal:      General: There is no distension.      Palpations: Abdomen is soft.      Tenderness: There is no abdominal tenderness.   Musculoskeletal:         General: Swelling (2+ BLE edema) present.      Cervical back: Neck supple.   Lymphadenopathy:      Cervical: No cervical adenopathy.   Skin:     General: Skin is warm and dry.   Neurological:      Mental Status: She is alert. Mental status is at baseline.   Psychiatric:         Mood and Affect: Mood normal.         Behavior: Behavior normal.          Laboratory Reviewed: (Yes)  Lab Results   Component Value Date    WBC 7.00 07/28/2023    HGB 12.2 07/28/2023    HCT 37.4 07/28/2023     07/28/2023    CHOL 170 08/04/2023    TRIG 116 08/04/2023    HDL 40 08/04/2023    ALT 14 07/28/2023    AST 19 07/28/2023     09/06/2023    K 4.1 09/06/2023     09/06/2023    CREATININE 1.6 (H) 09/06/2023    BUN 33 (H) 09/06/2023    CO2 23 09/06/2023    TSH 3.577 04/27/2023    INR 1.1 10/25/2022    HGBA1C 6.3 (H) 09/06/2023           Health Maintenance  Health Maintenance Topics with due status: Not Due       Topic Last Completion Date    Diabetes Urine Screening 03/17/2023    Lipid Panel 08/04/2023    Hemoglobin A1c 09/06/2023     Health Maintenance Due   Topic Date Due    Shingles Vaccine (1 of 2) Never done    Eye Exam  11/06/2021    TETANUS VACCINE  11/09/2021    COVID-19 Vaccine (5 - Pfizer risk series) 04/12/2023         Assessment:  Problem List Items Addressed This Visit          Cardiac/Vascular    Persistent atrial fibrillation - Primary     Declines EKG today.   Continues metoprolol, Eliquis.         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 Keep cardiology appt next week.            Endocrine    Type 2 diabetes mellitus with kidney complication, without long-term current use of insulin     Lab Results   Component Value Date    HGBA1C 6.3 (H) 09/06/2023   Diet controlled. At goal          Hyperparathyroidism     Lab Results   Component Value Date    .2 (H) 04/27/2023    CALCIUM 9.7 09/06/2023    PHOS 4.0 09/06/2023   Monitor            Other Visit Diagnoses       Edema, unspecified type        Relevant Medications    metOLazone (ZAROXOLYN) 2.5 MG tablet              Plan:  Persistent atrial fibrillation  -      IN OFFICE EKG 12-LEAD (to [facility])  -     metOLazone (ZAROXOLYN) 2.5 MG tablet; Take 1 tablet (2.5 mg total) by mouth daily as needed.  Dispense: 30 tablet; Refill: 2    Edema, unspecified type  -     metOLazone (ZAROXOLYN) 2.5 MG tablet; Take 1 tablet (2.5 mg total) by mouth daily as needed.  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 Patient expressed understanding. Patient was given the opportunity to ask questions and be an active participant in their medical care. Patient had no further questions or concerns at this time.   -Documentation of patient's health and condition was obtained from family member who was present during visit.  -Patient is an overall moderate risk for health complications from their medical conditions.       Follow up: Follow up in about 4 weeks (around 11/1/2023).      After visit summary printed and given to patient upon discharge.  Patient goals and care plan are included in After visit summary.    Total medical decision making time was 35 min.  The following issues were discussed: The primary encounter diagnosis was Persistent atrial fibrillation. 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    Health maintenance needs, recent test results and goals of care discussed with pt and questions answered.

## 2023-10-12 ENCOUNTER — PATIENT MESSAGE (OUTPATIENT)
Dept: PRIMARY CARE CLINIC | Facility: CLINIC | Age: 88
End: 2023-10-12
Payer: MEDICARE

## 2023-10-12 DIAGNOSIS — M79.642 PAIN IN BOTH HANDS: Primary | ICD-10-CM

## 2023-10-12 DIAGNOSIS — M79.641 PAIN IN BOTH HANDS: Primary | ICD-10-CM

## 2023-10-16 RX ORDER — NETARSUDIL 0.2 MG/ML
1 SOLUTION/ DROPS OPHTHALMIC; TOPICAL NIGHTLY
Qty: 2.5 ML | Refills: 10 | Status: SHIPPED | OUTPATIENT
Start: 2023-10-16 | End: 2023-12-04 | Stop reason: SDUPTHER

## 2023-10-20 ENCOUNTER — HOSPITAL ENCOUNTER (OUTPATIENT)
Dept: RADIOLOGY | Facility: HOSPITAL | Age: 88
Discharge: HOME | End: 2023-10-20
Attending: NURSE PRACTITIONER
Payer: MEDICARE

## 2023-10-20 ENCOUNTER — OFFICE VISIT (OUTPATIENT)
Dept: CARDIOLOGY | Facility: CLINIC | Age: 88
End: 2023-10-20
Payer: MEDICARE

## 2023-10-20 VITALS
DIASTOLIC BLOOD PRESSURE: 80 MMHG | HEIGHT: 64 IN | OXYGEN SATURATION: 94 % | WEIGHT: 177.69 LBS | SYSTOLIC BLOOD PRESSURE: 94 MMHG | HEART RATE: 109 BPM | BODY MASS INDEX: 30.34 KG/M2

## 2023-10-20 DIAGNOSIS — R06.02 SHORTNESS OF BREATH: ICD-10-CM

## 2023-10-20 DIAGNOSIS — I70.0 CALCIFICATION OF AORTA: ICD-10-CM

## 2023-10-20 DIAGNOSIS — I50.32 CHRONIC HEART FAILURE WITH PRESERVED EJECTION FRACTION: ICD-10-CM

## 2023-10-20 DIAGNOSIS — N18.4 CKD STAGE 4 SECONDARY TO HYPERTENSION: ICD-10-CM

## 2023-10-20 DIAGNOSIS — I13.2 HYPERTENSIVE HEART AND RENAL DISEASE WITH BOTH (CONGESTIVE) HEART FAILURE AND RENAL FAILURE: ICD-10-CM

## 2023-10-20 DIAGNOSIS — M79.641 PAIN IN BOTH HANDS: ICD-10-CM

## 2023-10-20 DIAGNOSIS — E78.2 MIXED HYPERLIPIDEMIA: ICD-10-CM

## 2023-10-20 DIAGNOSIS — M79.642 PAIN IN BOTH HANDS: ICD-10-CM

## 2023-10-20 DIAGNOSIS — I50.33 ACUTE ON CHRONIC HEART FAILURE WITH PRESERVED EJECTION FRACTION: ICD-10-CM

## 2023-10-20 DIAGNOSIS — Z98.890 S/P ABLATION OF ATRIAL FIBRILLATION: ICD-10-CM

## 2023-10-20 DIAGNOSIS — I65.29 STENOSIS OF CAROTID ARTERY, UNSPECIFIED LATERALITY: ICD-10-CM

## 2023-10-20 DIAGNOSIS — N18.32 STAGE 3B CHRONIC KIDNEY DISEASE: ICD-10-CM

## 2023-10-20 DIAGNOSIS — I65.23 CAROTID ARTERY CALCIFICATION, BILATERAL: ICD-10-CM

## 2023-10-20 DIAGNOSIS — I11.9 HYPERTENSIVE LEFT VENTRICULAR HYPERTROPHY, WITHOUT HEART FAILURE: ICD-10-CM

## 2023-10-20 DIAGNOSIS — I48.92 ATRIAL FLUTTER WITH RAPID VENTRICULAR RESPONSE: Primary | ICD-10-CM

## 2023-10-20 DIAGNOSIS — I95.9 HYPOTENSIVE EPISODE: ICD-10-CM

## 2023-10-20 DIAGNOSIS — I10 PRIMARY HYPERTENSION: ICD-10-CM

## 2023-10-20 DIAGNOSIS — I73.9 PAD (PERIPHERAL ARTERY DISEASE): ICD-10-CM

## 2023-10-20 DIAGNOSIS — I48.0 PAROXYSMAL ATRIAL FIBRILLATION: ICD-10-CM

## 2023-10-20 DIAGNOSIS — N19 HYPERTENSIVE HEART AND RENAL DISEASE WITH BOTH (CONGESTIVE) HEART FAILURE AND RENAL FAILURE: ICD-10-CM

## 2023-10-20 DIAGNOSIS — Z86.79 S/P ABLATION OF ATRIAL FIBRILLATION: ICD-10-CM

## 2023-10-20 DIAGNOSIS — I51.3 THROMBUS OF LEFT ATRIAL APPENDAGE: ICD-10-CM

## 2023-10-20 DIAGNOSIS — I48.0 PAF (PAROXYSMAL ATRIAL FIBRILLATION): ICD-10-CM

## 2023-10-20 DIAGNOSIS — I48.91 ATRIAL FIBRILLATION WITH RVR: ICD-10-CM

## 2023-10-20 DIAGNOSIS — I12.9 CKD STAGE 4 SECONDARY TO HYPERTENSION: ICD-10-CM

## 2023-10-20 DIAGNOSIS — I48.19 PERSISTENT ATRIAL FIBRILLATION: ICD-10-CM

## 2023-10-20 PROCEDURE — 1126F PR PAIN SEVERITY QUANTIFIED, NO PAIN PRESENT: ICD-10-PCS | Mod: HCNC,CPTII,S$GLB, | Performed by: INTERNAL MEDICINE

## 2023-10-20 PROCEDURE — 73110 XR WRIST COMPLETE 3 VIEWS BILATERAL: ICD-10-PCS | Mod: 26,50,HCNC, | Performed by: RADIOLOGY

## 2023-10-20 PROCEDURE — 1160F RVW MEDS BY RX/DR IN RCRD: CPT | Mod: HCNC,CPTII,S$GLB, | Performed by: INTERNAL MEDICINE

## 2023-10-20 PROCEDURE — 99999 PR PBB SHADOW E&M-EST. PATIENT-LVL V: ICD-10-PCS | Mod: PBBFAC,HCNC,, | Performed by: INTERNAL MEDICINE

## 2023-10-20 PROCEDURE — 99999 PR PBB SHADOW E&M-EST. PATIENT-LVL V: CPT | Mod: PBBFAC,HCNC,, | Performed by: INTERNAL MEDICINE

## 2023-10-20 PROCEDURE — 1159F PR MEDICATION LIST DOCUMENTED IN MEDICAL RECORD: ICD-10-PCS | Mod: HCNC,CPTII,S$GLB, | Performed by: INTERNAL MEDICINE

## 2023-10-20 PROCEDURE — 73130 X-RAY EXAM OF HAND: CPT | Mod: 26,50,HCNC, | Performed by: RADIOLOGY

## 2023-10-20 PROCEDURE — 99214 OFFICE O/P EST MOD 30 MIN: CPT | Mod: HCNC,S$GLB,, | Performed by: INTERNAL MEDICINE

## 2023-10-20 PROCEDURE — 1101F PR PT FALLS ASSESS DOC 0-1 FALLS W/OUT INJ PAST YR: ICD-10-PCS | Mod: HCNC,CPTII,S$GLB, | Performed by: INTERNAL MEDICINE

## 2023-10-20 PROCEDURE — 73110 X-RAY EXAM OF WRIST: CPT | Mod: 26,50,HCNC, | Performed by: RADIOLOGY

## 2023-10-20 PROCEDURE — 99214 PR OFFICE/OUTPT VISIT, EST, LEVL IV, 30-39 MIN: ICD-10-PCS | Mod: HCNC,S$GLB,, | Performed by: INTERNAL MEDICINE

## 2023-10-20 PROCEDURE — 1101F PT FALLS ASSESS-DOCD LE1/YR: CPT | Mod: HCNC,CPTII,S$GLB, | Performed by: INTERNAL MEDICINE

## 2023-10-20 PROCEDURE — 1126F AMNT PAIN NOTED NONE PRSNT: CPT | Mod: HCNC,CPTII,S$GLB, | Performed by: INTERNAL MEDICINE

## 2023-10-20 PROCEDURE — 1160F PR REVIEW ALL MEDS BY PRESCRIBER/CLIN PHARMACIST DOCUMENTED: ICD-10-PCS | Mod: HCNC,CPTII,S$GLB, | Performed by: INTERNAL MEDICINE

## 2023-10-20 PROCEDURE — 3288F FALL RISK ASSESSMENT DOCD: CPT | Mod: HCNC,CPTII,S$GLB, | Performed by: INTERNAL MEDICINE

## 2023-10-20 PROCEDURE — 3288F PR FALLS RISK ASSESSMENT DOCUMENTED: ICD-10-PCS | Mod: HCNC,CPTII,S$GLB, | Performed by: INTERNAL MEDICINE

## 2023-10-20 PROCEDURE — 73130 X-RAY EXAM OF HAND: CPT | Mod: TC,50,HCNC

## 2023-10-20 PROCEDURE — 73130 XR HAND COMPLETE 3 VIEWS BILATERAL: ICD-10-PCS | Mod: 26,50,HCNC, | Performed by: RADIOLOGY

## 2023-10-20 PROCEDURE — 73110 X-RAY EXAM OF WRIST: CPT | Mod: TC,50,HCNC

## 2023-10-20 PROCEDURE — 1159F MED LIST DOCD IN RCRD: CPT | Mod: HCNC,CPTII,S$GLB, | Performed by: INTERNAL MEDICINE
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
## 2023-10-21 NOTE — PROGRESS NOTES
Pt has [facility] - if message below not viewed please call w information below:   A lot of arthritic changes noted on Xrays of hands and wrists.   Can discuss further on upcoming f/u appt victor manuel Heather 11/03/23.  Have you tried any topicals like diclofenac (voltaren) gel?  Our Rheumatologist consultant recommends for morning stiffness of hand osteoarthritis wrapping your hands in wet hand towels that have been warmed in the microwave.     Please message or call with any questions or concerns!  Thank you!  Phylicia Deleon MD, MPH  [facility]

## 2023-10-24 ENCOUNTER — TELEPHONE (OUTPATIENT)
Dept: PRIMARY CARE CLINIC | Facility: CLINIC | Age: 88
End: 2023-10-24
Payer: MEDICARE

## 2023-10-24 DIAGNOSIS — M25.531 PAIN IN BOTH WRISTS: Primary | ICD-10-CM

## 2023-10-24 DIAGNOSIS — M25.532 PAIN IN BOTH WRISTS: Primary | ICD-10-CM

## 2023-10-24 NOTE — TELEPHONE ENCOUNTER
----- Message from Heather Miller NP sent at 10/24/2023  4:33 PM CDT -----  Regarding: rheumatology appt  Would you schedule an appt with A Kareem in rheumatology. She is an established but has a referral order if you need it. TY!

## 2023-10-25 NOTE — TELEPHONE ENCOUNTER
I cris w/ the pt in regards to scheudling a rheum appt.     Informed pt that  have no available appts at this and Aim Serna PA-C is leaving [facility].     Pt accepted next available appt, preferred [facility] location.    Requested to be placed on the wait list for sooner appt.

## 2023-10-30 ENCOUNTER — LAB VISIT (OUTPATIENT)
Dept: LAB | Facility: HOSPITAL | Age: 88
End: 2023-10-30
Attending: STUDENT IN AN ORGANIZED HEALTH CARE EDUCATION/TRAINING PROGRAM
Payer: MEDICARE

## 2023-10-30 ENCOUNTER — OFFICE VISIT (OUTPATIENT)
Dept: RHEUMATOLOGY | Facility: CLINIC | Age: 88
End: 2023-10-30
Payer: MEDICARE

## 2023-10-30 VITALS
DIASTOLIC BLOOD PRESSURE: 78 MMHG | SYSTOLIC BLOOD PRESSURE: 137 MMHG | BODY MASS INDEX: 30.34 KG/M2 | HEART RATE: 103 BPM | HEIGHT: 64 IN | WEIGHT: 177.69 LBS | RESPIRATION RATE: 18 BRPM

## 2023-10-30 DIAGNOSIS — M79.10 MYALGIA: ICD-10-CM

## 2023-10-30 DIAGNOSIS — R76.8 POSITIVE ANA (ANTINUCLEAR ANTIBODY): ICD-10-CM

## 2023-10-30 DIAGNOSIS — M25.531 PAIN IN BOTH WRISTS: ICD-10-CM

## 2023-10-30 DIAGNOSIS — M25.532 PAIN IN BOTH WRISTS: ICD-10-CM

## 2023-10-30 DIAGNOSIS — M19.041 PRIMARY OSTEOARTHRITIS OF BOTH HANDS: Primary | ICD-10-CM

## 2023-10-30 DIAGNOSIS — M19.042 PRIMARY OSTEOARTHRITIS OF BOTH HANDS: Primary | ICD-10-CM

## 2023-10-30 DIAGNOSIS — M19.041 PRIMARY OSTEOARTHRITIS OF BOTH HANDS: ICD-10-CM

## 2023-10-30 DIAGNOSIS — M19.042 PRIMARY OSTEOARTHRITIS OF BOTH HANDS: ICD-10-CM

## 2023-10-30 LAB
CK SERPL-CCNC: 39 U/L (ref 20–180)
CRP SERPL-MCNC: 2.1 MG/L (ref 0–8.2)

## 2023-10-30 PROCEDURE — 36415 COLL VENOUS BLD VENIPUNCTURE: CPT | Mod: HCNC | Performed by: STUDENT IN AN ORGANIZED HEALTH CARE EDUCATION/TRAINING PROGRAM

## 2023-10-30 PROCEDURE — 82550 ASSAY OF CK (CPK): CPT | Mod: HCNC | Performed by: STUDENT IN AN ORGANIZED HEALTH CARE EDUCATION/TRAINING PROGRAM

## 2023-10-30 PROCEDURE — 82085 ASSAY OF ALDOLASE: CPT | Mod: HCNC | Performed by: STUDENT IN AN ORGANIZED HEALTH CARE EDUCATION/TRAINING PROGRAM

## 2023-10-30 PROCEDURE — 99214 OFFICE O/P EST MOD 30 MIN: CPT | Mod: HCNC,S$GLB,, | Performed by: STUDENT IN AN ORGANIZED HEALTH CARE EDUCATION/TRAINING PROGRAM

## 2023-10-30 PROCEDURE — 99214 PR OFFICE/OUTPT VISIT, EST, LEVL IV, 30-39 MIN: ICD-10-PCS | Mod: HCNC,S$GLB,, | Performed by: STUDENT IN AN ORGANIZED HEALTH CARE EDUCATION/TRAINING PROGRAM

## 2023-10-30 PROCEDURE — 1160F RVW MEDS BY RX/DR IN RCRD: CPT | Mod: HCNC,CPTII,S$GLB, | Performed by: STUDENT IN AN ORGANIZED HEALTH CARE EDUCATION/TRAINING PROGRAM

## 2023-10-30 PROCEDURE — 1159F PR MEDICATION LIST DOCUMENTED IN MEDICAL RECORD: ICD-10-PCS | Mod: HCNC,CPTII,S$GLB, | Performed by: STUDENT IN AN ORGANIZED HEALTH CARE EDUCATION/TRAINING PROGRAM

## 2023-10-30 PROCEDURE — 1125F PR PAIN SEVERITY QUANTIFIED, PAIN PRESENT: ICD-10-PCS | Mod: HCNC,CPTII,S$GLB, | Performed by: STUDENT IN AN ORGANIZED HEALTH CARE EDUCATION/TRAINING PROGRAM

## 2023-10-30 PROCEDURE — 1125F AMNT PAIN NOTED PAIN PRSNT: CPT | Mod: HCNC,CPTII,S$GLB, | Performed by: STUDENT IN AN ORGANIZED HEALTH CARE EDUCATION/TRAINING PROGRAM

## 2023-10-30 PROCEDURE — 99999 PR PBB SHADOW E&M-EST. PATIENT-LVL V: CPT | Mod: PBBFAC,HCNC,, | Performed by: STUDENT IN AN ORGANIZED HEALTH CARE EDUCATION/TRAINING PROGRAM

## 2023-10-30 PROCEDURE — 1160F PR REVIEW ALL MEDS BY PRESCRIBER/CLIN PHARMACIST DOCUMENTED: ICD-10-PCS | Mod: HCNC,CPTII,S$GLB, | Performed by: STUDENT IN AN ORGANIZED HEALTH CARE EDUCATION/TRAINING PROGRAM

## 2023-10-30 PROCEDURE — 99999 PR PBB SHADOW E&M-EST. PATIENT-LVL V: ICD-10-PCS | Mod: PBBFAC,HCNC,, | Performed by: STUDENT IN AN ORGANIZED HEALTH CARE EDUCATION/TRAINING PROGRAM

## 2023-10-30 PROCEDURE — 1159F MED LIST DOCD IN RCRD: CPT | Mod: HCNC,CPTII,S$GLB, | Performed by: STUDENT IN AN ORGANIZED HEALTH CARE EDUCATION/TRAINING PROGRAM

## 2023-10-30 PROCEDURE — 85651 RBC SED RATE NONAUTOMATED: CPT | Mod: HCNC | Performed by: STUDENT IN AN ORGANIZED HEALTH CARE EDUCATION/TRAINING PROGRAM

## 2023-10-30 PROCEDURE — 86140 C-REACTIVE PROTEIN: CPT | Mod: HCNC | Performed by: STUDENT IN AN ORGANIZED HEALTH CARE EDUCATION/TRAINING PROGRAM

## 2023-10-31 LAB — ERYTHROCYTE [SEDIMENTATION RATE] IN BLOOD BY PHOTOMETRIC METHOD: 50 MM/HR (ref 0–36)

## 2023-10-31 NOTE — PROGRESS NOTES
RHEUMATOLOGY CLINIC ESTABLISHED PATIENT VISIT    Reason for consult:- positive VIRY; wrists pain    Chief complaints, HPI, ROS, EXAM, Assessment & Plans:-    Shirley Metz is a 89 y.o. pleasant female who presents to follow-up for history of positive VIRY and recent development of wrist pain/swelling.  She was last seen by Dr. Jiménez June of 2022.  No evidence at that time of underlying autoimmune disease.  She does have osteoarthritis.  She is recently noticed some swelling at her wrists.  Not much in the way of pain.  She had x-rays that showed degenerative changes and chondrocalcinosis.  She also notes having some stiffness and pain in her shoulders and hips especially in the mornings.  She uses arnica care cream which is very helpful.  Rheumatologic review of systems otherwise negative.  Left wrist with swelling that seems possibly a ganglion cyst.  No tenderness to palpation.  No warmth.  Full range of motion.  No synovitis, dactylitis or enthesitis.      Reviewed all available old and outside pertinent medical records.    All lab results personally reviewed and interpreted by me.    1. Primary osteoarthritis of both hands    2. Myalgia    3. Positive VIRY (antinuclear antibody)        Problem List Items Addressed This Visit          Orthopedic    Myalgia    Relevant Orders    CK    Aldolase    Sedimentation rate    C-Reactive Protein     Other Visit Diagnoses       Primary osteoarthritis of both hands    -  Primary    Relevant Orders    Ambulatory referral/consult to Orthopedics    CK    Aldolase    Sedimentation rate    C-Reactive Protein    Positive VIRY (antinuclear antibody)                Patient following up today for history of positive VIRY and recent wrist swelling  No evidence of autoimmune disease at this time   Wrists swelling seems possibly like a ganglion cyst   Would like her to be evaluated with orthopedic hand surgery further  Some shoulder and hip girdle pain/stiffness  Will check  inflammatory markers as well as CK/aldolase  Counseled on benefits of weight loss and recommended Mediterranean diet    # Follow up if symptoms worsen or fail to improve.    Chronic comorbid conditions affecting medical decision making today:    Past Medical History:   Diagnosis Date    Anemia 11/29/2018    Anxiety 11/28/2017    Arthritis     Atrial fibrillation with RVR 10/9/2019    Back pain     Basal cell carcinoma 03/29/2018    left mid back    Chronic diastolic congestive heart failure 10/15/2019    CKD (chronic kidney disease) stage 3, GFR 30-59 ml/min 8/8/2016    Colon polyps     reported per patient.     Coronary artery calcification 10/5/2021    Fuchs' corneal dystrophy     General anesthetics causing adverse effect in therapeutic use     woke up during surgery and gagged on ET tube    Glaucoma     Glaucoma     Heart failure with preserved left ventricular function (HFpEF) 7/24/2018    Hyperlipidemia     Hypertension     Macular degeneration dry    Obesity     Pre-diabetes 12/4/2017    PVD (peripheral vascular disease) 4/26/2021    PVD (peripheral vascular disease) 4/26/2021    Squamous cell carcinoma 01/08/2019    left shoulder    Stenosis of carotid artery 10/5/2021    Trouble in sleeping     Type 2 diabetes mellitus without complication, without long-term current use of insulin 2/24/2021    Urinary tract infection        Past Surgical History:   Procedure Laterality Date    ABLATION OF ARRHYTHMOGENIC FOCUS FOR ATRIAL FIBRILLATION N/A 11/3/2022    Procedure: Ablation atrial fibrillation;  Surgeon: Toi Kelly MD;  Location: [facility];  Service: Cardiology;  Laterality: N/A;  afib, PVI/CTI, RFA, STACEY (cx if SR), DEDE, anes, MB, 3 Prep    ADENOIDECTOMY  1938    BREAST BIOPSY Left     1997. benign    BREAST CYST EXCISION Left 1997 approx    CARPAL TUNNEL RELEASE Right 2012 approx    CATARACT EXTRACTION Bilateral 1994    CHOLECYSTECTOMY  1975    CORNEAL TRANSPLANT Bilateral 08/2015 8/2015 - left;  Right 4/2016    EYE SURGERY      Fuch's Corneal dystrophy - had 2nd operation with Dr. Quintanilla    EYE SURGERY      Cataract wIOL    left thumb Left 2011    removed cuboid for arthritis    REVERSE TOTAL SHOULDER ARTHROPLASTY Left 8/21/2018    Procedure: ARTHROPLASTY, SHOULDER, TOTAL, REVERSE;  Surgeon: Solomon Lujan MD;  Location: [facility] OR;  Service: Orthopedics;  Laterality: Left;  WITH BICEP TENODESIS    SKIN CANCER EXCISION Left 2017    BCC removal by Dr. Valadez    SLT- OS (aka TRABECULOPLASTY) Left 05/11/2011    repeat 3/14/12    TENOTOMY Left 8/21/2018    Procedure: TENOTOMY;  Surgeon: Solomon Lujan MD;  Location: [facility] OR;  Service: Orthopedics;  Laterality: Left;    TONSILLECTOMY  1938    TRANSESOPHAGEAL ECHOCARDIOGRAM WITH POSSIBLE CARDIOVERSION (STACEY W/ POSS CARDIOVERSION) N/A 3/21/2023    Procedure: Transesophageal echo (STACEY) intra-procedure log documentation;  Surgeon: Trevon Ramirez MD;  Location: [facility] CATH LAB;  Service: Cardiology;  Laterality: N/A;    TREATMENT OF CARDIAC ARRHYTHMIA N/A 10/10/2019    Procedure: CARDIOVERSION;  Surgeon: Pete Durán MD;  Location: [facility] CATH LAB;  Service: Cardiology;  Laterality: N/A;    TREATMENT OF CARDIAC ARRHYTHMIA N/A 12/6/2019    Procedure: CARDIOVERSION;  Surgeon: Samy Castillo MD;  Location: [facility] CATH LAB;  Service: Cardiology;  Laterality: N/A;        Social History     Tobacco Use    Smoking status: Never    Smokeless tobacco: Never    Tobacco comments:     history of passive smoking from her ex-   Substance Use Topics    Alcohol use: Yes     Alcohol/week: 2.0 standard drinks of alcohol     Types: 2 Standard drinks or equivalent per week     Comment: occ    Drug use: No       Family History   Problem Relation Age of Onset    Heart disease Mother     Hypertension Mother     Cancer Father 88        sarcoma( ?Kaposi's ) on leg    Deep vein thrombosis Neg Hx     Ovarian cancer Neg Hx     Breast cancer Neg Hx     Kidney disease Neg Hx     Strabismus Neg  Hx     Retinal detachment Neg Hx     Macular degeneration Neg Hx     Glaucoma Neg Hx     Blindness Neg Hx     Amblyopia Neg Hx     Eczema Neg Hx     Lupus Neg Hx     Melanoma Neg Hx     Psoriasis Neg Hx     Diabetes Neg Hx     Stroke Neg Hx     Mental retardation Neg Hx     Mental illness Neg Hx     Hyperlipidemia Neg Hx     COPD Neg Hx     Asthma Neg Hx     Depression Neg Hx     Alcohol abuse Neg Hx     Drug abuse Neg Hx        Review of patient's allergies indicates:   Allergen Reactions    Carvedilol Swelling     Lip swelling    Cephalexin Other (See Comments)     Muscle/joint weakness unable to move     Doxycycline Shortness Of Breath, Nausea And Vomiting and Other (See Comments)     weakness    Erythromycin Other (See Comments)     Complete weakness/could not walk    Gadolinium-containing contrast media Swelling     angioedema    Hydrocodone-acetaminophen Shortness Of Breath     wheezing    Inveltys [loteprednol etabonate] Palpitations and Other (See Comments)     Patient stated bp went up.    Labetalol Shortness Of Breath, Nausea Only, Palpitations and Other (See Comments)     weakness    Loratadine Other (See Comments)     Double vision/spots  Other reaction(s): double vision    Losartan Other (See Comments)     weakness    Naproxen      wheezing  wheezing  wheezing  Other reaction(s): wheezing    Statins-hmg-coa reductase inhibitors Other (See Comments)     Severe Muscle weakness  Muscle weakness  Severe Muscle weakness  Other reaction(s): severe muscle weakness    Amlodipine Other (See Comments)     Myalgias    Fenofibrate Other (See Comments)     muscle weakness      Hydralazine Other (See Comments)     muscle tremors    Hydralazine analogues Other (See Comments)     Muscle tremors/aches      Loteprednol Other (See Comments)     increased BP    Macrolide antibiotics Other (See Comments)     Complete weakness/could not walk      Moxifloxacin Hives and Other (See Comments)     muscle soreness    Timolol  Other (See Comments)     Blurred vision, Burning eyes, Severe muscle weakness, eye problems.      Verapamil Diarrhea     Severe diarrhea.      Hydrocortisone     Isosorbide      Tolerates Imdur    Tetanus and diphtheria toxoids     Adhesive Rash     Clonidine patch--contact dermatitis    Codeine Diarrhea and Other (See Comments)     Loss of balance       Doxazosin Palpitations    Fexofenadine Other (See Comments)     Double vision/spots       Hydrocortisone (bulk) Other (See Comments)     Only suppository/ caused muscle weakness    Latanoprost Other (See Comments)     Muscle weakness      Olopatadine Other (See Comments)     Muscle weakness      Prednisone Anxiety       Medication List with Changes/Refills   Current Medications    ACETAMINOPHEN (TYLENOL) 500 MG TABLET    Take 500 mg by mouth nightly as needed.    ALPRAZOLAM (XANAX) 0.5 MG TABLET    Take 1 tablet (0.5 mg total) by mouth nightly as needed for Anxiety.    APIXABAN (ELIQUIS) 2.5 MG TAB    Take 1 tablet (2.5 mg total) by mouth 2 (two) times daily.    B COMPLEX VITAMINS CAPSULE    Take 1 capsule by mouth once daily.    BRINZOLAMIDE (AZOPT) 1 % OPHTHALMIC SUSPENSION    Place 1 drop into the left eye 2 (two) times a day.    CHOLECALCIFEROL, VITAMIN D3, (VITAMIN D3) 50 MCG (2,000 UNIT) CAP    Take 1 capsule by mouth once daily.    COENZYME Q10 200 MG CAPSULE    Take 200 mg by mouth once daily.    FISH OIL-OMEGA-3 FATTY ACIDS 300-1,000 MG CAPSULE    Take 1 capsule by mouth once daily.    FUROSEMIDE (LASIX) 20 MG TABLET    Take 1 tablet (20 mg total) by mouth 2 (two) times a day.    METOLAZONE (ZAROXOLYN) 2.5 MG TABLET    Take 1 tablet (2.5 mg total) by mouth daily as needed.    METOPROLOL TARTRATE (LOPRESSOR) 25 MG TABLET    Take 1.5 tablets (37.5 mg total) by mouth 2 (two) times daily.    RHOPRESSA 0.02 % OPHTHALMIC SOLUTION    PLACE 1 DROP INTO THE LEFT EYE EVERY EVENING.    SARS-COV-2, COVID-19 PFIZER OMICRON, (PFIZER COVID BIVAL,12Y UP,,PF,) 30 MCG/0.3 ML  INJECTION    Inject into the muscle.    TRAVATAN Z 0.004 % OPHTHALMIC SOLUTION    Place 1 drop into the left eye every evening.         Disclaimer: This note was prepared using voice recognition system and is likely to have sound alike errors and is not proofread.  Please message me with any questions.    32 minutes of total time spent on the encounter, which includes face to face time and non-face to face time preparing to see the patient (eg, review of tests), Obtaining and/or reviewing separately obtained history, Documenting clinical information in the electronic or other health record, Independently interpreting results (not separately reported) and communicating results to the patient/family/caregiver, or Care coordination (not separately reported).     Thank you for allowing me to participate in the care of Shirley Metz.    Clemente Rodriguez MD

## 2023-11-01 LAB — ALDOLASE SERPL-CCNC: 5.3 U/L (ref 1.2–7.6)

## 2023-11-02 ENCOUNTER — TELEPHONE (OUTPATIENT)
Dept: ORTHOPEDICS | Facility: CLINIC | Age: 88
End: 2023-11-02
Payer: MEDICARE

## 2023-11-02 NOTE — TELEPHONE ENCOUNTER
Contacted patient to schedule appt from [facility].  Patient asked to defer scheduling until the end of December as she had other health issues that she is trying to deal with at this time.  Deferred referral until December 29 and patient would like to be scheduled with Dr. Fish.

## 2023-11-03 ENCOUNTER — OFFICE VISIT (OUTPATIENT)
Dept: PRIMARY CARE CLINIC | Facility: CLINIC | Age: 88
End: 2023-11-03
Payer: MEDICARE

## 2023-11-03 VITALS
SYSTOLIC BLOOD PRESSURE: 116 MMHG | TEMPERATURE: 98 F | HEART RATE: 125 BPM | HEIGHT: 64 IN | BODY MASS INDEX: 30.92 KG/M2 | OXYGEN SATURATION: 95 % | WEIGHT: 181.13 LBS | DIASTOLIC BLOOD PRESSURE: 58 MMHG

## 2023-11-03 DIAGNOSIS — J30.2 SEASONAL ALLERGIC RHINITIS, UNSPECIFIED TRIGGER: ICD-10-CM

## 2023-11-03 DIAGNOSIS — N19 HYPERTENSIVE HEART AND RENAL DISEASE WITH BOTH (CONGESTIVE) HEART FAILURE AND RENAL FAILURE: ICD-10-CM

## 2023-11-03 DIAGNOSIS — I48.19 PERSISTENT ATRIAL FIBRILLATION: ICD-10-CM

## 2023-11-03 DIAGNOSIS — E78.2 MIXED HYPERLIPIDEMIA: ICD-10-CM

## 2023-11-03 DIAGNOSIS — I13.2 HYPERTENSIVE HEART AND RENAL DISEASE WITH BOTH (CONGESTIVE) HEART FAILURE AND RENAL FAILURE: ICD-10-CM

## 2023-11-03 PROCEDURE — 1159F PR MEDICATION LIST DOCUMENTED IN MEDICAL RECORD: ICD-10-PCS | Mod: HCNC,CPTII,S$GLB, | Performed by: NURSE PRACTITIONER

## 2023-11-03 PROCEDURE — 1125F AMNT PAIN NOTED PAIN PRSNT: CPT | Mod: HCNC,CPTII,S$GLB, | Performed by: NURSE PRACTITIONER

## 2023-11-03 PROCEDURE — 3288F FALL RISK ASSESSMENT DOCD: CPT | Mod: HCNC,CPTII,S$GLB, | Performed by: NURSE PRACTITIONER

## 2023-11-03 PROCEDURE — 1159F MED LIST DOCD IN RCRD: CPT | Mod: HCNC,CPTII,S$GLB, | Performed by: NURSE PRACTITIONER

## 2023-11-03 PROCEDURE — 99215 PR OFFICE/OUTPT VISIT, EST, LEVL V, 40-54 MIN: ICD-10-PCS | Mod: HCNC,S$GLB,, | Performed by: NURSE PRACTITIONER

## 2023-11-03 PROCEDURE — 99215 OFFICE O/P EST HI 40 MIN: CPT | Mod: HCNC,S$GLB,, | Performed by: NURSE PRACTITIONER

## 2023-11-03 PROCEDURE — 3288F PR FALLS RISK ASSESSMENT DOCUMENTED: ICD-10-PCS | Mod: HCNC,CPTII,S$GLB, | Performed by: NURSE PRACTITIONER

## 2023-11-03 PROCEDURE — 99999 PR PBB SHADOW E&M-EST. PATIENT-LVL IV: ICD-10-PCS | Mod: PBBFAC,HCNC,, | Performed by: NURSE PRACTITIONER

## 2023-11-03 PROCEDURE — 99999 PR PBB SHADOW E&M-EST. PATIENT-LVL IV: CPT | Mod: PBBFAC,HCNC,, | Performed by: NURSE PRACTITIONER

## 2023-11-03 PROCEDURE — 1101F PT FALLS ASSESS-DOCD LE1/YR: CPT | Mod: HCNC,CPTII,S$GLB, | Performed by: NURSE PRACTITIONER

## 2023-11-03 PROCEDURE — 1101F PR PT FALLS ASSESS DOC 0-1 FALLS W/OUT INJ PAST YR: ICD-10-PCS | Mod: HCNC,CPTII,S$GLB, | Performed by: NURSE PRACTITIONER

## 2023-11-03 PROCEDURE — 1125F PR PAIN SEVERITY QUANTIFIED, PAIN PRESENT: ICD-10-PCS | Mod: HCNC,CPTII,S$GLB, | Performed by: NURSE PRACTITIONER

## 2023-11-03 NOTE — ASSESSMENT & PLAN NOTE
Continues metoprolol, furosemide.   Increase furosemide to 60 mg each day for 3 days due to weight gain/edema sx.

## 2023-11-03 NOTE — ASSESSMENT & PLAN NOTE
Lab Results   Component Value Date    LDLCALC 106.8 08/04/2023   Monitor. Cannot tolerate statin.

## 2023-11-03 NOTE — PATIENT INSTRUCTIONS
If you are feeling unwell, we'd like to be the first ones to know here at [facility]! Please give us a call. Same day appointments are our top priority to keep you well and out of the emergency rooms and hospitals. Call [phone] for our direct line. After hours advice is always available. Please call [phone] after hours to speak to the on-call team.      Continue to check blood pressure and heart rate twice daily. Continue metoprolol 1.5 tablet (37.5 mg) twice daily. IF heart rate (pulse) is >100 then okay to take metoprolol 50 mg instead of 37.5 mg to lower heart rate.     Increase Lasix (furosemide) to 40 mg each morning and 20 mg each afternoon for the next 5 days or until swelling is resolved.

## 2023-11-03 NOTE — PROGRESS NOTES
Shirley Metz  11/03/2023  [id number]    Phylicia Deleon MD  Patient Care Team:  Phylicia Deleon MD as PCP - General (Internal Medicine)  Wilbert Ware MD as Consulting Physician (Ophthalmology)  Rajinder Quintanilla MD as Consulting Physician (Ophthalmology)  Rosario Valadez MD as Consulting Physician (Dermatology)  Amish Mendoza MD (Pulmonary Disease)  Jaquan Choi MD as Consulting Physician (Vascular Surgery)  Emmanuel Fish MD as Consulting Physician (Hand Surgery)  PAXTON Chaidez Jr., MD as Consulting Physician (Orthopedic Surgery)  Phylicia Deleon MD as Physician (Internal Medicine)  Heather Miller NP as Nurse Practitioner (Family Medicine)      [facility] Note      Chief Complaint:  Chief Complaint   Patient presents with    Follow-up     1 month f/u       History of Present Illness:  HPI    Rheumatology 10/30/23: referred to ortho for evaluation of wrist pain. ESR at upper limit of normal when corrected for age. Believes may be related to ganglion cyst.     Cardiology 10/20/23: 10/20/2023 patient presents the office intermittent palpitations monitor showing evidence atrial flutter intermittently and will send her back to see Dr. Kelly.  Ablation in the past and he may need to do further ablation or pacemaker with ablation in the future.  Heart rates under difficult to control current medications. Referred again to Dr Kelly.  PCP/me 10/4/23: metolazone changed to PRN. Midodrine stopped/not taking.     No pain to wrists/hands. Plans to see Dr Fish when Afib and glaucoma stable.     Eye appt in about 10 days. No new eye sx.     Has appt to see Dr Kelly later this month.   Feels palpitations past two months.     Continues with intermittent muscle pain and stiffness. Takes several hours to work through. No joint pain. Sx decreased with application of cold or heat. Muscle weakness, decreased  and UE strength.     Home BP typically 114/70.  HR typically 110 bpm. Palpitations.     Concerned about CKD and using diclofenac; she has opted not to start.     No dyspnea. Feels she has increased truncal and pedal. Has taken metolazone 2-3 times since LOV. Continues furosemide 40 mg qd most days, occasionally takes furosemide 20 mg q PM.     Wt Readings from Last 3 Encounters:   11/03/23 1047 82.1 kg (181 lb 1.6 oz)   10/30/23 1511 80.6 kg (177 lb 11.1 oz)   10/20/23 1043 80.6 kg (177 lb 11.1 oz)         Review of Systems   Constitutional:  Negative for activity change.   HENT:  Positive for congestion, postnasal drip, rhinorrhea and sinus pressure.    Respiratory:  Positive for cough (mild). Negative for chest tightness and shortness of breath.    Cardiovascular:  Positive for palpitations and leg swelling. Negative for chest pain.         The following were reviewed: Active problem list, medication list, allergies, family history, social history, and Health Maintenance.       Medications:  Current Outpatient Medications on File Prior to Visit   Medication Sig Dispense Refill    acetaminophen (TYLENOL) 500 MG tablet Take 500 mg by mouth nightly as needed.      ALPRAZolam (XANAX) 0.5 MG tablet Take 1 tablet (0.5 mg total) by mouth nightly as needed for Anxiety. 30 tablet 5    apixaban (ELIQUIS) 2.5 mg Tab Take 1 tablet (2.5 mg total) by mouth 2 (two) times daily. 180 tablet 3    b complex vitamins capsule Take 1 capsule by mouth once daily.      brinzolamide (AZOPT) 1 % ophthalmic suspension Place 1 drop into the left eye 2 (two) times a day. 5 mL 3    cholecalciferol, vitamin D3, (VITAMIN D3) 50 mcg (2,000 unit) Cap Take 1 capsule by mouth once daily.      coenzyme Q10 200 mg capsule Take 200 mg by mouth once daily.      fish oil-omega-3 fatty acids 300-1,000 mg capsule Take 1 capsule by mouth once daily.      furosemide (LASIX) 20 MG tablet Take 1 tablet (20 mg total) by mouth 2 (two) times a day. 180 tablet 1    metOLazone (ZAROXOLYN) 2.5 MG tablet Take 1  tablet (2.5 mg total) by mouth daily as needed. 30 tablet 2    metoprolol tartrate (LOPRESSOR) 25 MG tablet Take 1.5 tablets (37.5 mg total) by mouth 2 (two) times daily. 180 tablet 3    RHOPRESSA 0.02 % ophthalmic solution PLACE 1 DROP INTO THE LEFT EYE EVERY EVENING. 2.5 mL 10    TRAVATAN Z 0.004 % ophthalmic solution Place 1 drop into the left eye every evening. 2.5 mL 12    sars-cov-2, covid-19 pfizer omicron, (PFIZER COVID BIVAL,12Y UP,,PF,) 30 mcg/0.3 mL injection Inject into the muscle. 0.3 mL 0    [DISCONTINUED] cloNIDine (CATAPRES) 0.1 MG tablet Take 1 tablet (0.1 mg total) by mouth 2 (two) times daily as needed (systolic BP over 180). 20 tablet 1    [DISCONTINUED] isosorbide mononitrate (IMDUR) 30 MG 24 hr tablet Take 1 tablet (30 mg total) by mouth every evening. 90 tablet 1     Current Facility-Administered Medications on File Prior to Visit   Medication Dose Route Frequency Provider Last Rate Last Admin    sodium chloride 0.9% flush 3 mL  3 mL Intravenous PRN Steve Galarza PA-C        vancomycin in dextrose 5 % 1 gram/250 mL IVPB 1,000 mg  1,000 mg Intravenous On Call Procedure Vianney Fountain, NP           Medications have been reviewed and reconciled with patient at visit today.    Barriers to medications present (no )    Fall since last office visit (no )      Exam:  Vitals:    11/03/23 1047   BP: (!) 116/58   Pulse: (!) 125   Temp: 98.4 °F (36.9 °C)     Weight: 82.1 kg (181 lb 1.6 oz)   Body mass index is 31.09 kg/m².      BP Readings from Last 3 Encounters:   11/03/23 (!) 116/58   10/30/23 137/78   10/20/23 94/80     Wt Readings from Last 3 Encounters:   11/03/23 1047 82.1 kg (181 lb 1.6 oz)   10/30/23 1511 80.6 kg (177 lb 11.1 oz)   10/20/23 1043 80.6 kg (177 lb 11.1 oz)            Physical Exam  Constitutional:       General: She is not in acute distress.  HENT:      Head: Normocephalic.      Right Ear: Tympanic membrane normal.      Left Ear: Tympanic membrane normal.      Nose:  Rhinorrhea present. No congestion.      Mouth/Throat:      Mouth: Mucous membranes are moist.      Pharynx: Posterior oropharyngeal erythema (very mild) present. No oropharyngeal exudate.   Eyes:      General: No scleral icterus.  Cardiovascular:      Rate and Rhythm: Tachycardia present. Rhythm irregular.      Comments: Apical rate 112 bpm  Pulmonary:      Effort: Pulmonary effort is normal.      Breath sounds: Normal breath sounds.   Abdominal:      General: There is no distension.      Palpations: Abdomen is soft.      Tenderness: There is no abdominal tenderness.   Musculoskeletal:         General: Swelling (trace BLE) present.      Cervical back: Neck supple.   Lymphadenopathy:      Cervical: No cervical adenopathy.   Skin:     General: Skin is warm and dry.   Neurological:      Mental Status: She is alert. Mental status is at baseline.   Psychiatric:         Mood and Affect: Mood normal.         Behavior: Behavior normal.         Thought Content: Thought content normal.         Laboratory Reviewed: (Yes)  Lab Results   Component Value Date    WBC 7.00 07/28/2023    HGB 12.2 07/28/2023    HCT 37.4 07/28/2023     07/28/2023    CHOL 170 08/04/2023    TRIG 116 08/04/2023    HDL 40 08/04/2023    ALT 14 07/28/2023    AST 19 07/28/2023     09/06/2023    K 4.1 09/06/2023     09/06/2023    CREATININE 1.6 (H) 09/06/2023    BUN 33 (H) 09/06/2023    CO2 23 09/06/2023    TSH 3.577 04/27/2023    INR 1.1 10/25/2022    HGBA1C 6.3 (H) 09/06/2023           Health Maintenance  Health Maintenance Topics with due status: Not Due       Topic Last Completion Date    Diabetes Urine Screening 03/17/2023    Lipid Panel 08/04/2023    Hemoglobin A1c 09/06/2023     Health Maintenance Due   Topic Date Due    Shingles Vaccine (1 of 2) Never done    RSV Vaccine (Age 60+) (1 - 1-dose 60+ series) Never done    Eye Exam  11/06/2021    TETANUS VACCINE  11/09/2021    COVID-19 Vaccine (5 - 2023-24 season) 09/01/2023          Assessment:  Problem List Items Addressed This Visit          ENT    Seasonal allergic rhinitis     Discussed mgmt options.   Declines any change in POC.            Cardiac/Vascular    Persistent atrial fibrillation     Has f/u with Dr Kelly this month.   Continue metoprolol, will increase dose to 50 mg if HR>100.   Continues DOAC.            Mixed hyperlipidemia     Lab Results   Component Value Date    LDLCALC 106.8 08/04/2023   Monitor. Cannot tolerate statin.           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   Increase furosemide to 60 mg each day for 3 days due to weight gain/edema sx.               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 Patient expressed understanding. Patient was given the opportunity to ask questions and be an active participant in their medical care. Patient had no further questions or concerns at this time.   -Documentation of patient's health and condition was obtained from family member who was present during visit.  -Patient is an overall moderate risk for health complications from their medical conditions.       Follow up: Follow up in about 6 weeks (around 12/15/2023).      After visit summary printed and given to patient upon discharge.  Patient goals and care plan are included in After visit summary.    Total medical decision making time was 68 min.  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    Health maintenance needs, recent test results and goals of care discussed with pt and questions answered.

## 2023-11-03 NOTE — ASSESSMENT & PLAN NOTE
Has f/u with Dr Kelly this month.   Continue metoprolol, will increase dose to 50 mg if HR>100.   Continues DOAC.

## 2023-11-14 ENCOUNTER — OFFICE VISIT (OUTPATIENT)
Dept: OPHTHALMOLOGY | Facility: CLINIC | Age: 88
End: 2023-11-14
Payer: MEDICARE

## 2023-11-14 ENCOUNTER — DOCUMENTATION ONLY (OUTPATIENT)
Dept: OPHTHALMOLOGY | Facility: CLINIC | Age: 88
End: 2023-11-14
Payer: MEDICARE

## 2023-11-14 DIAGNOSIS — Z96.1 PSEUDOPHAKIA: ICD-10-CM

## 2023-11-14 DIAGNOSIS — H40.1131 PRIMARY OPEN ANGLE GLAUCOMA (POAG) OF BOTH EYES, MILD STAGE: Primary | ICD-10-CM

## 2023-11-14 DIAGNOSIS — M35.01 KERATITIS SICCA, BILATERAL: ICD-10-CM

## 2023-11-14 PROCEDURE — 1159F PR MEDICATION LIST DOCUMENTED IN MEDICAL RECORD: ICD-10-PCS | Mod: HCNC,CPTII,S$GLB, | Performed by: OPHTHALMOLOGY

## 2023-11-14 PROCEDURE — 1160F PR REVIEW ALL MEDS BY PRESCRIBER/CLIN PHARMACIST DOCUMENTED: ICD-10-PCS | Mod: HCNC,CPTII,S$GLB, | Performed by: OPHTHALMOLOGY

## 2023-11-14 PROCEDURE — 99999 PR PBB SHADOW E&M-EST. PATIENT-LVL III: ICD-10-PCS | Mod: PBBFAC,HCNC,, | Performed by: OPHTHALMOLOGY

## 2023-11-14 PROCEDURE — 99214 PR OFFICE/OUTPT VISIT, EST, LEVL IV, 30-39 MIN: ICD-10-PCS | Mod: HCNC,S$GLB,, | Performed by: OPHTHALMOLOGY

## 2023-11-14 PROCEDURE — 92133 POSTERIOR SEGMENT OCT OPTIC NERVE(OCULAR COHERENCE TOMOGRAPHY) - OU - BOTH EYES: ICD-10-PCS | Mod: HCNC,S$GLB,, | Performed by: OPHTHALMOLOGY

## 2023-11-14 PROCEDURE — 92133 CPTRZD OPH DX IMG PST SGM ON: CPT | Mod: HCNC,S$GLB,, | Performed by: OPHTHALMOLOGY

## 2023-11-14 PROCEDURE — 99999 PR PBB SHADOW E&M-EST. PATIENT-LVL III: CPT | Mod: PBBFAC,HCNC,, | Performed by: OPHTHALMOLOGY

## 2023-11-14 PROCEDURE — 1160F RVW MEDS BY RX/DR IN RCRD: CPT | Mod: HCNC,CPTII,S$GLB, | Performed by: OPHTHALMOLOGY

## 2023-11-14 PROCEDURE — 1159F MED LIST DOCD IN RCRD: CPT | Mod: HCNC,CPTII,S$GLB, | Performed by: OPHTHALMOLOGY

## 2023-11-14 PROCEDURE — 92083 HUMPHREY VISUAL FIELD - OU - BOTH EYES: ICD-10-PCS | Mod: HCNC,S$GLB,, | Performed by: OPHTHALMOLOGY

## 2023-11-14 PROCEDURE — 99214 OFFICE O/P EST MOD 30 MIN: CPT | Mod: HCNC,S$GLB,, | Performed by: OPHTHALMOLOGY

## 2023-11-14 PROCEDURE — 92083 EXTENDED VISUAL FIELD XM: CPT | Mod: HCNC,S$GLB,, | Performed by: OPHTHALMOLOGY

## 2023-11-14 RX ORDER — GATIFLOXACIN 5 MG/ML
1 SOLUTION/ DROPS OPHTHALMIC EVERY 12 HOURS
Qty: 5 ML | Refills: 1 | Status: SHIPPED | OUTPATIENT
Start: 2023-11-14 | End: 2024-01-30

## 2023-11-14 RX ORDER — PREDNISOLONE ACETATE 10 MG/ML
1 SUSPENSION/ DROPS OPHTHALMIC 4 TIMES DAILY
Qty: 5 ML | Refills: 1 | Status: SHIPPED | OUTPATIENT
Start: 2023-11-14 | End: 2024-01-29

## 2023-11-14 NOTE — PROGRESS NOTES
SUBJECTIVE  AdrianaIfrah Metz is 89 y.o. female  Uncorrected distance visual acuity was 20/40 -2 in the right eye and 20/50 +1 in the left eye.   Chief Complaint   Patient presents with    Glaucoma     Pt reports for 3m HVF GOCT IOP. Denies any pain or irritation. Va stable. 100% compliant with gtts.           HPI     Glaucoma     Additional comments: Pt reports for 3m HVF GOCT IOP. Denies any pain or   irritation. Va stable. 100% compliant with gtts.            Comments    1. Mild COAG Goal < 19   SLT OD 3/04 (20 to 15) + 3/11 (19 to 15)   SLT OS 5/05 (20 to 14) +5/11 (18-19 to 15) + 3/12 (min resp.) + 3/11/15   (20.5-17.5) + 3/7/18 (24- 21) + 11/3/21 (22.5-17.5)  (Cosopt, Xalatan, and Timolol cause Myalgias)   (Alphagan causes redness) (zioptan too expensive)   Possible steroid responder   2. PCIOL OU   3. Mild Dry AMD   4. Fuch's Dystrophy   DSAEK OD 4/16 Dr. Quintanilla (followed by [facility] every summer)   DSAEK OS 8/15 Dr. Quintanilla   Rx Inveltys (1% lotemax) (IOP 15/23) 5/29/20   Dr. Quintanilla Ok'd to stop Lotemax OU  5. Dry Eye -intolerance to Restasis   6. Recent A-Fib. (from Labetolol ?)       Systane Ultra 4-5 tiimes daily   Travatan Z qhs os   Rhopressa qhs OS   Azopt qam OS    +HAG             Last edited by Toi Garcia on 11/14/2023  2:00 PM.         Assessment /Plan :  1. Primary open angle glaucoma (POAG) of both eyes, mild stage     Doing well, intraocular pressure (IOP) OD within acceptable range relative to target IOP and no evidence of progression. Continue current treatment. Reviewed importance of continued compliance with treatment and follow up.    Intraocular pressure (IOP) OS not within acceptable range relative to target IOP with risk of irreversible visual loss. Better IOP control is recommended. Treatment options may include change or additional medications, Selective Laser Trabeculoplasty (SLT laser), and/or incisional glaucoma surgery. Reviewed importance of continued compliance with  treatment and follow up.     Patient chooses schedule XenGel OS and stop blood thinners (patient to confer with prescribing provider if any questions)  + BLOCK    Patient instructed to continue using the following glaucoma medication as follows:  Rhopressa one drop in the left eye nightly, Travatan-Z one drop in the left eye nightly, and Azopt one drop in the left eye every morning       2. Keratitis sicca, bilateral  -- Continue the use of the artificial tears one drop in each eye as needed     3. Pseudophakia  -- Condition stable, no therapeutic change required. Monitoring routinely.

## 2023-11-14 NOTE — PROGRESS NOTES
Short Stay Record    CC: Uncontrolled glaucoma OS    HPI:  Shirley Metz is a 89 y.o. female who presents c/o symptomatic blurring of vision and uncontrolled glaucoma in the left eye.    Past Medical History:   Diagnosis Date    Anemia 11/29/2018    Anxiety 11/28/2017    Arthritis     Atrial fibrillation with RVR 10/9/2019    Back pain     Basal cell carcinoma 03/29/2018    left mid back    Chronic diastolic congestive heart failure 10/15/2019    CKD (chronic kidney disease) stage 3, GFR 30-59 ml/min 8/8/2016    Colon polyps     reported per patient.     Coronary artery calcification 10/5/2021    Fuchs' corneal dystrophy     General anesthetics causing adverse effect in therapeutic use     woke up during surgery and gagged on ET tube    Glaucoma     Glaucoma     Heart failure with preserved left ventricular function (HFpEF) 7/24/2018    Hyperlipidemia     Hypertension     Macular degeneration dry    Obesity     Pre-diabetes 12/4/2017    PVD (peripheral vascular disease) 4/26/2021    PVD (peripheral vascular disease) 4/26/2021    Squamous cell carcinoma 01/08/2019    left shoulder    Stenosis of carotid artery 10/5/2021    Trouble in sleeping     Type 2 diabetes mellitus without complication, without long-term current use of insulin 2/24/2021    Urinary tract infection      Past Surgical History:   Procedure Laterality Date    ABLATION OF ARRHYTHMOGENIC FOCUS FOR ATRIAL FIBRILLATION N/A 11/3/2022    Procedure: Ablation atrial fibrillation;  Surgeon: Toi Kelly MD;  Location: [facility];  Service: Cardiology;  Laterality: N/A;  afib, PVI/CTI, RFA, STACEY (cx if SR), DEDE, anes, MB, 3 Prep    ADENOIDECTOMY  1938    BREAST BIOPSY Left     1997. benign    BREAST CYST EXCISION Left 1997 approx    CARPAL TUNNEL RELEASE Right 2012 approx    CATARACT EXTRACTION Bilateral 1994    CHOLECYSTECTOMY  1975    CORNEAL TRANSPLANT Bilateral 08/2015 8/2015 - left; Right 4/2016    EYE SURGERY      Fuch's Corneal  dystrophy - had 2nd operation with Dr. Quintanilla    EYE SURGERY      Cataract wIOL    left thumb Left 2011    removed cuboid for arthritis    REVERSE TOTAL SHOULDER ARTHROPLASTY Left 8/21/2018    Procedure: ARTHROPLASTY, SHOULDER, TOTAL, REVERSE;  Surgeon: Solomon Lujan MD;  Location: [facility] OR;  Service: Orthopedics;  Laterality: Left;  WITH BICEP TENODESIS    SKIN CANCER EXCISION Left 2017    BCC removal by Dr. Valadez    SLT- OS (aka TRABECULOPLASTY) Left 05/11/2011    repeat 3/14/12    TENOTOMY Left 8/21/2018    Procedure: TENOTOMY;  Surgeon: Solomon Lujan MD;  Location: [facility] OR;  Service: Orthopedics;  Laterality: Left;    TONSILLECTOMY  1938    TRANSESOPHAGEAL ECHOCARDIOGRAM WITH POSSIBLE CARDIOVERSION (STACEY W/ POSS CARDIOVERSION) N/A 3/21/2023    Procedure: Transesophageal echo (STACEY) intra-procedure log documentation;  Surgeon: Trevon Ramirez MD;  Location: [facility] CATH LAB;  Service: Cardiology;  Laterality: N/A;    TREATMENT OF CARDIAC ARRHYTHMIA N/A 10/10/2019    Procedure: CARDIOVERSION;  Surgeon: Pete Durán MD;  Location: [facility] CATH LAB;  Service: Cardiology;  Laterality: N/A;    TREATMENT OF CARDIAC ARRHYTHMIA N/A 12/6/2019    Procedure: CARDIOVERSION;  Surgeon: Samy Castillo MD;  Location: [facility] CATH LAB;  Service: Cardiology;  Laterality: N/A;     Review of patient's allergies indicates:   Allergen Reactions    Carvedilol Swelling     Lip swelling    Cephalexin Other (See Comments)     Muscle/joint weakness unable to move     Doxycycline Shortness Of Breath, Nausea And Vomiting and Other (See Comments)     weakness    Erythromycin Other (See Comments)     Complete weakness/could not walk    Gadolinium-containing contrast media Swelling     angioedema    Hydrocodone-acetaminophen Shortness Of Breath     wheezing    Inveltys [loteprednol etabonate] Palpitations and Other (See Comments)     Patient stated bp went up.    Labetalol Shortness Of Breath, Nausea Only, Palpitations and Other (See Comments)      weakness    Loratadine Other (See Comments)     Double vision/spots  Other reaction(s): double vision    Losartan Other (See Comments)     weakness    Naproxen      wheezing  wheezing  wheezing  Other reaction(s): wheezing    Statins-hmg-coa reductase inhibitors Other (See Comments)     Severe Muscle weakness  Muscle weakness  Severe Muscle weakness  Other reaction(s): severe muscle weakness    Amlodipine Other (See Comments)     Myalgias    Fenofibrate Other (See Comments)     muscle weakness      Hydralazine Other (See Comments)     muscle tremors    Hydralazine analogues Other (See Comments)     Muscle tremors/aches      Loteprednol Other (See Comments)     increased BP    Macrolide antibiotics Other (See Comments)     Complete weakness/could not walk      Moxifloxacin Hives and Other (See Comments)     muscle soreness    Timolol Other (See Comments)     Blurred vision, Burning eyes, Severe muscle weakness, eye problems.      Verapamil Diarrhea     Severe diarrhea.      Hydrocortisone     Isosorbide      Tolerates Imdur    Tetanus and diphtheria toxoids     Adhesive Rash     Clonidine patch--contact dermatitis    Codeine Diarrhea and Other (See Comments)     Loss of balance       Doxazosin Palpitations    Fexofenadine Other (See Comments)     Double vision/spots       Hydrocortisone (bulk) Other (See Comments)     Only suppository/ caused muscle weakness    Latanoprost Other (See Comments)     Muscle weakness      Olopatadine Other (See Comments)     Muscle weakness      Prednisone Anxiety       Current Outpatient Medications:     acetaminophen (TYLENOL) 500 MG tablet, Take 500 mg by mouth nightly as needed., Disp: , Rfl:     ALPRAZolam (XANAX) 0.5 MG tablet, Take 1 tablet (0.5 mg total) by mouth nightly as needed for Anxiety., Disp: 30 tablet, Rfl: 5    apixaban (ELIQUIS) 2.5 mg Tab, Take 1 tablet (2.5 mg total) by mouth 2 (two) times daily., Disp: 180 tablet, Rfl: 3    b complex vitamins capsule, Take 1 capsule  by mouth once daily., Disp: , Rfl:     brinzolamide (AZOPT) 1 % ophthalmic suspension, Place 1 drop into the left eye 2 (two) times a day., Disp: 5 mL, Rfl: 3    cholecalciferol, vitamin D3, (VITAMIN D3) 50 mcg (2,000 unit) Cap, Take 1 capsule by mouth once daily., Disp: , Rfl:     coenzyme Q10 200 mg capsule, Take 200 mg by mouth once daily., Disp: , Rfl:     fish oil-omega-3 fatty acids 300-1,000 mg capsule, Take 1 capsule by mouth once daily., Disp: , Rfl:     furosemide (LASIX) 20 MG tablet, Take 1 tablet (20 mg total) by mouth 2 (two) times a day., Disp: 180 tablet, Rfl: 1    gatifloxacin 0.5 % Drop drops, Place 1 drop into the left eye every 12 (twelve) hours. Start using the day before surgery, Disp: 5 mL, Rfl: 1    metOLazone (ZAROXOLYN) 2.5 MG tablet, Take 1 tablet (2.5 mg total) by mouth daily as needed., Disp: 30 tablet, Rfl: 2    metoprolol tartrate (LOPRESSOR) 25 MG tablet, Take 1.5 tablets (37.5 mg total) by mouth 2 (two) times daily., Disp: 180 tablet, Rfl: 3    prednisoLONE acetate (PRED FORTE) 1 % DrpS, Place 1 drop into the left eye 4 (four) times daily. Start using the day before surgery, Disp: 5 mL, Rfl: 1    RHOPRESSA 0.02 % ophthalmic solution, PLACE 1 DROP INTO THE LEFT EYE EVERY EVENING., Disp: 2.5 mL, Rfl: 10    sars-cov-2, covid-19 pfizer omicron, (PFIZER COVID BIVAL,12Y UP,,PF,) 30 mcg/0.3 mL injection, Inject into the muscle., Disp: 0.3 mL, Rfl: 0    TRAVATAN Z 0.004 % ophthalmic solution, Place 1 drop into the left eye every evening., Disp: 2.5 mL, Rfl: 12  No current facility-administered medications for this visit.    Facility-Administered Medications Ordered in Other Visits:     sodium chloride 0.9% flush 3 mL, 3 mL, Intravenous, PRN, Steve Galarza PA-C    vancomycin in dextrose 5 % 1 gram/250 mL IVPB 1,000 mg, 1,000 mg, Intravenous, On Call Procedure, Vianney Fountain, NP    Review of Systems:  Pertinent items are noted in the chief complaint and history of present  illness.    Physical Exam:  General Appearance:    A&Ox3, no distress, appears stated age   Head:    Normocephalic, without obvious abnormality, atraumatic   Lungs:     respirations unlabored   Chest Wall:    No tenderness or deformity   External Exam  Last IOP 13/31   Right Left   External 2+ Brow Ptosis, 4+ Dermatochalasis 2+ Brow Ptosis, 4+ Dermatochalasis     Slit Lamp Exam     Right Left   Lids/Lashes 1+ Blepharitis 1+ Blepharitis   Conjunctiva/Sclera trace  Injection  2+ Injection   Cornea nasal pterygium, DSAEK, 1+ central Punctate epithelial erosions DSAEK, 2+ central Punctate epithelial erosions   Anterior Chamber Deep and quiet Deep and quiet   Iris Round and reactive Round and reactive   Lens PC IOL, Clear capsulotomy PC IOL, Clear capsulotomy   Vitreous Normal Normal       Impression: Uncontrolled glaucoma in the right eye.    Planned Procedure: XenGel Drain / Bleb Needling / MMC OS and discontinuation of blood thinners if appropriate after discussion with prescribing physician      Patient chooses schedule XenGel OS and stop blood thinners (patient to confer with prescribing provider if any questions)  + BLOCK         Discharge Summary:    Admitting Diagnosis: Uncontrolled glaucoma OS    Discharge Diagnosis: Glaucoma OS    Procedure:  XenGel Drain / Bleb Needling / MMC OS    Complications: None    Discharge Condition: Stable    Discharge instructions: Follow post-operative discharge instruction sheet per provider.    Follow-up: Return to clinic next day or as scheduled.

## 2023-11-20 ENCOUNTER — PATIENT MESSAGE (OUTPATIENT)
Dept: PRIMARY CARE CLINIC | Facility: CLINIC | Age: 88
End: 2023-11-20
Payer: MEDICARE

## 2023-11-20 ENCOUNTER — PATIENT MESSAGE (OUTPATIENT)
Dept: CARDIOLOGY | Facility: CLINIC | Age: 88
End: 2023-11-20
Payer: MEDICARE

## 2023-11-21 DIAGNOSIS — I11.9 HYPERTENSIVE LEFT VENTRICULAR HYPERTROPHY, WITHOUT HEART FAILURE: Primary | Chronic | ICD-10-CM

## 2023-11-22 DIAGNOSIS — H40.1131 PRIMARY OPEN ANGLE GLAUCOMA (POAG) OF BOTH EYES, MILD STAGE: ICD-10-CM

## 2023-11-22 RX ORDER — BRINZOLAMIDE 10 MG/ML
1 SUSPENSION/ DROPS OPHTHALMIC 2 TIMES DAILY
Qty: 5 ML | Refills: 3 | Status: SHIPPED | OUTPATIENT
Start: 2023-11-22 | End: 2024-04-20

## 2023-11-28 NOTE — PROGRESS NOTES
Subjective:   Patient ID:  Shirley Mtez is a 89 y.o. female who presents for follow-up of Atrial Fibrillation      89 yoF referred for AF management.     8/17/22: NSR on recent ECGs up until 6/15/22. AFL and AF on ECGs afterwards starting 7/11/22. Event monitor with 100% AF, controlled average V rate. Normal EF. She had AF after a new BP agent 10/19. Her symptoms are mild. She has tried amiodarone in the past which caused side effects. She has history of CAD and has QTc 450s.     9/22: AF 9/2/22 ECG. She continues to have symptomatic AF as well as symptoms on medication. She wishes to pursue ablation.     2/23: PVI/CTI 11/3/22. She had upper lip swelling after her PVI, suspected to be due to trauma from the STACEY/ET intubations. She had some complaints about the overnight care. She was in NSR on ECGs up until 1/5/23. 1/10/23 she was in atypical AFL. She takes lasix 40 mg qd and sometimes at night. Her AF episodes are few and far between based on her home BP and HR checks. She still complains of SOB. She has a lasix plan provided by the HF clinic.     3/23: Event monitor performed but not ready for assessment. She has continued HF symptoms. She is in AF today    5/23: AFL on event monitor 4/23 and on holter 5/23. Symptoms improved since her ablation with some minor swelling    Interval history: Stable AFL with 2:1 conduction on 9/23 holter. Her symptoms are stable and improved since prior to her ablation.     Event monitor 9/23:  ·  Appears to have predominant Atrial flutter, Recommend to confirm with 12 leads ECG as there is baseline artifact  ·  Heart rates varied between 69 and 146 BPM with an average of 106 BPM.  ·  There were frequent PVCs totalling 2910 and averaging 60.65 per hour.  ·  The longest RR interval was 1700 msec.  ·  The longest run of SVT was at 128 bpm for 25 beats  ·  During her reproted symptoms aflutter with controlled rates    Event monitor 4/23:  · The patient complained of 1  episodes. The symptom(s) included shortness of breath. The corresponding rhythm to the patient reported event was atrial fibrillation.  · Patient Reported Event #2 Patient activated. The patient complained of 2 episodes. The corresponding rhythm to the patient reported event was atrial fibrillation. These symptoms were associated with PVCs.  · The total Afib burden was 100%.  · The patient was asymptomatic with atrial fibrillation.    Ablation 11/22:  ·  CTI ablation.  ·  Pulmonary vein isolation.  ·  Intracardiac echo.  ·  3D mapping performed with Ensite.    Echo 7/12/22:  · Concentric remodeling and normal systolic function.  · The estimated ejection fraction is 60%.  · Normal left ventricular diastolic function.  · Normal right ventricular size with normal right ventricular systolic function.    Event monitor 7/22:  - Heart rates varied between 41 and 123 BPM with an average of 77 BPM.  - Predominant rhythm: atrial fibrillation   - Atrial Fibrillation (AF) 100.0 %  - PVC 0.48 %    Past Medical History:  11/29/2018: Anemia  11/28/2017: Anxiety  No date: Arthritis  10/9/2019: Atrial fibrillation with RVR  No date: Back pain  03/29/2018: Basal cell carcinoma      Comment:  left mid back  10/15/2019: Chronic diastolic congestive heart failure  8/8/2016: CKD (chronic kidney disease) stage 3, GFR 30-59 ml/min  No date: Colon polyps      Comment:  reported per patient.   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  No date: General anesthetics causing adverse effect in therapeutic use      Comment:  woke up during surgery and gagged on ET tube  No date: Glaucoma  No date: Glaucoma  7/24/2018: Heart failure with preserved left ventricular function   (HFpEF)  No date: Hyperlipidemia  No date: Hypertension  dry: Macular degeneration  No date: Obesity  12/4/2017: Pre-diabetes  4/26/2021: PVD (peripheral vascular disease)  4/26/2021: PVD (peripheral vascular disease)  01/08/2019: Squamous  cell carcinoma      Comment:  left shoulder  10/5/2021: Stenosis of carotid artery  No date: Trouble in sleeping  2/24/2021: Type 2 diabetes mellitus without complication, without   long-term current use of insulin  No date: Urinary tract infection    Past Surgical History:  11/3/2022: ABLATION OF ARRHYTHMOGENIC FOCUS FOR ATRIAL FIBRILLATION;   N/A      Comment:  Procedure: Ablation atrial fibrillation;  Surgeon:                Toi Kelly MD;  Location: [facility] EP LAB;  Service:               Cardiology;  Laterality: N/A;  afib, PVI/CTI, RFA, STACEY                (cx if SR), DEDE, anes, MB, 3 Prep  1938: ADENOIDECTOMY  No date: BREAST BIOPSY; Left      Comment:  1997. benign  1997 approx: BREAST CYST EXCISION; Left  2012 approx: CARPAL TUNNEL RELEASE; Right  1994: CATARACT EXTRACTION; Bilateral  1975: CHOLECYSTECTOMY  08/2015: CORNEAL TRANSPLANT; Bilateral      Comment:  8/2015 - left; Right 4/2016  No date: EYE SURGERY      Comment:  Fuch's Corneal dystrophy - had 2nd operation with Dr. Quintanilla  No date: EYE SURGERY      Comment:  Cataract wIOL  2011: left thumb; Left      Comment:  removed cuboid for arthritis  8/21/2018: REVERSE TOTAL SHOULDER ARTHROPLASTY; Left      Comment:  Procedure: ARTHROPLASTY, SHOULDER, TOTAL, REVERSE;                 Surgeon: Solomon Lujan MD;  Location: [facility];                 Service: Orthopedics;  Laterality: Left;  WITH BICEP                TENODESIS  2017: SKIN CANCER EXCISION; Left      Comment:  BCC removal by Dr. Valadez  05/11/2011: SLT- OS (aka TRABECULOPLASTY); Left      Comment:  repeat 3/14/12  8/21/2018: TENOTOMY; Left      Comment:  Procedure: TENOTOMY;  Surgeon: Solomon Lujan MD;                 Location: [facility] OR;  Service: Orthopedics;  Laterality:                Left;  1938: TONSILLECTOMY  3/21/2023: TRANSESOPHAGEAL ECHOCARDIOGRAM WITH POSSIBLE CARDIOVERSION   (STACEY W/ POSS CARDIOVERSION); N/A      Comment:  Procedure: Transesophageal echo  (STACEY) intra-procedure                log documentation;  Surgeon: Trevon Ramirez MD;  Location:                [facility] CATH LAB;  Service: Cardiology;  Laterality: N/A;  10/10/2019: TREATMENT OF CARDIAC ARRHYTHMIA; N/A      Comment:  Procedure: CARDIOVERSION;  Surgeon: Pete Durán MD;                Location: [facility] CATH LAB;  Service: Cardiology;                 Laterality: N/A;  12/6/2019: TREATMENT OF CARDIAC ARRHYTHMIA; N/A      Comment:  Procedure: CARDIOVERSION;  Surgeon: Samy Castillo MD;                 Location: [facility] CATH LAB;  Service: Cardiology;                 Laterality: N/A;    Social History    Socioeconomic History      Marital status:       Number of children: 3    Tobacco Use      Smoking status: Never      Smokeless tobacco: Never      Tobacco comments: history of passive smoking from her ex-    Substance and Sexual Activity      Alcohol use: Yes        Alcohol/week: 2.0 standard drinks of alcohol        Types: 2 Standard drinks or equivalent per week        Comment: occ      Drug use: No      Sexual activity: Not Currently        Partners: Male        Birth control/protection: Post-menopausal        Comment: discussed protection for STD's    Other Topics      Concerns:        Are you pregnant or think you may be?: No        Breast-feeding: No    Social History Narrative       x 2,  x 1. Retired artist - previously doing jewelry/wall pieces; ; lives in [facility]; has 3 sons - 52( lives in [place], 54, and 56;exercises minimally - limited due to back issues. Still drives. Does have a Living Will.    Social Determinants of Health  Financial Resource Strain: Low Risk  (9/21/2023)      Overall Financial Resource Strain (CARDIA)          Difficulty of Paying Living Expenses: Not hard at all  Food Insecurity: No Food Insecurity (10/17/2023)      Hunger Vital Sign          Worried About Running Out of Food in the Last Year: Never true          Ran Out of  Food in the Last Year: Never true  Transportation Needs: No Transportation Needs (10/17/2023)      PRAPARE - Transportation          Lack of Transportation (Medical): No          Lack of Transportation (Non-Medical): No  Physical Activity: Unknown (10/17/2023)      Exercise Vital Sign          Days of Exercise per Week: Patient refused          Minutes of Exercise per Session: 0 min  Recent Concern: Physical Activity - Inactive (9/21/2023)      Exercise Vital Sign          Days of Exercise per Week: 0 days          Minutes of Exercise per Session: 0 min  Stress: No Stress Concern Present (10/17/2023)      [employer] - Occupational Stress Questionnaire          Feeling of Stress : Only a little  Social Connections: Unknown (10/17/2023)      Social Connection and Isolation Panel [NHANES]          Frequency of Communication with Friends and Family: More than three times a week          Frequency of Social Gatherings with Friends and Family: More than three times a week          Active Member of Clubs or Organizations: Yes          Attends Club or Organization Meetings: More than 4 times per year          Marital Status:   Housing Stability: Low Risk  (10/17/2023)      Housing Stability Vital Sign          Unable to Pay for Housing in the Last Year: No          Number of Places Lived in the Last Year: 1          Unstable Housing in the Last Year: No    Review of patient's family history indicates:  Problem: Heart disease      Relation: Mother          Age of Onset: (Not Specified)  Problem: Hypertension      Relation: Mother          Age of Onset: (Not Specified)  Problem: Cancer      Relation: Father          Age of Onset: 88          Comment: sarcoma( ?Kaposi's ) on leg  Problem: Deep vein thrombosis      Relation: Neg Hx          Age of Onset: (Not Specified)  Problem: Ovarian cancer      Relation: Neg Hx          Age of Onset: (Not Specified)  Problem: Breast cancer       Relation: Neg Hx          Age of Onset: (Not Specified)  Problem: Kidney disease      Relation: Neg Hx          Age of Onset: (Not Specified)  Problem: Strabismus      Relation: Neg Hx          Age of Onset: (Not Specified)  Problem: Retinal detachment      Relation: Neg Hx          Age of Onset: (Not Specified)  Problem: Macular degeneration      Relation: Neg Hx          Age of Onset: (Not Specified)  Problem: Glaucoma      Relation: Neg Hx          Age of Onset: (Not Specified)  Problem: Blindness      Relation: Neg Hx          Age of Onset: (Not Specified)  Problem: Amblyopia      Relation: Neg Hx          Age of Onset: (Not Specified)  Problem: Eczema      Relation: Neg Hx          Age of Onset: (Not Specified)  Problem: Lupus      Relation: Neg Hx          Age of Onset: (Not Specified)  Problem: Melanoma      Relation: Neg Hx          Age of Onset: (Not Specified)  Problem: Psoriasis      Relation: Neg Hx          Age of Onset: (Not Specified)  Problem: Diabetes      Relation: Neg Hx          Age of Onset: (Not Specified)  Problem: Stroke      Relation: Neg Hx          Age of Onset: (Not Specified)  Problem: Intellectual disability      Relation: Neg Hx          Age of Onset: (Not Specified)  Problem: Mental illness      Relation: Neg Hx          Age of Onset: (Not Specified)  Problem: Hyperlipidemia      Relation: Neg Hx          Age of Onset: (Not Specified)  Problem: COPD      Relation: Neg Hx          Age of Onset: (Not Specified)  Problem: Asthma      Relation: Neg Hx          Age of Onset: (Not Specified)  Problem: Depression      Relation: Neg Hx          Age of Onset: (Not Specified)  Problem: Alcohol abuse      Relation: Neg Hx          Age of Onset: (Not Specified)  Problem: Drug abuse      Relation: Neg Hx          Age of Onset: (Not Specified)      Current Outpatient Medications:  acetaminophen (TYLENOL) 500 MG tablet, Take 500 mg by mouth nightly as needed., Disp: , Rfl:   ALPRAZolam (XANAX) 0.5  MG tablet, Take 1 tablet (0.5 mg total) by mouth nightly as needed for Anxiety., Disp: 30 tablet, Rfl: 5  apixaban (ELIQUIS) 2.5 mg Tab, Take 1 tablet (2.5 mg total) by mouth 2 (two) times daily., Disp: 180 tablet, Rfl: 3  b complex vitamins capsule, Take 1 capsule by mouth once daily., Disp: , Rfl:   brinzolamide (AZOPT) 1 % ophthalmic suspension, Place 1 drop into the left eye 2 (two) times a day., Disp: 5 mL, Rfl: 3  cholecalciferol, vitamin D3, (VITAMIN D3) 50 mcg (2,000 unit) Cap, Take 1 capsule by mouth once daily., Disp: , Rfl:   coenzyme Q10 200 mg capsule, Take 200 mg by mouth once daily., Disp: , Rfl:    fish oil-omega-3 fatty acids 300-1,000 mg capsule, Take 1 capsule by mouth once daily., Disp: , Rfl:   furosemide (LASIX) 20 MG tablet, Take 1 tablet (20 mg total) by mouth 2 (two) times a day., Disp: 180 tablet, Rfl: 1  gatifloxacin 0.5 % Drop drops, Place 1 drop into the left eye every 12 (twelve) hours. Start using the day before surgery, Disp: 5 mL, Rfl: 1  metOLazone (ZAROXOLYN) 2.5 MG tablet, Take 1 tablet (2.5 mg total) by mouth daily as needed., Disp: 30 tablet, Rfl: 2  metoprolol tartrate (LOPRESSOR) 25 MG tablet, Take 1.5 tablets (37.5 mg total) by mouth 2 (two) times daily., Disp: 180 tablet, Rfl: 3  prednisoLONE acetate (PRED FORTE) 1 % DrpS, Place 1 drop into the left eye 4 (four) times daily. Start using the day before surgery, Disp: 5 mL, Rfl: 1  RHOPRESSA 0.02 % ophthalmic solution, PLACE 1 DROP INTO THE LEFT EYE EVERY EVENING., Disp: 2.5 mL, Rfl: 10  sars-cov-2, covid-19 pfizer omicron, (PFIZER COVID BIVAL,12Y UP,,PF,) 30 mcg/0.3 mL injection, Inject into the muscle., Disp: 0.3 mL, Rfl: 0  TRAVATAN Z 0.004 % ophthalmic solution, Place 1 drop into the left eye every evening., Disp: 2.5 mL, Rfl: 12    No current facility-administered medications for this visit.  Facility-Administered Medications Ordered in Other Visits:  sodium chloride 0.9% flush 3 mL, 3 mL, Intravenous, PRN, Olinde,  Steve APONTE PA-C  vancomycin in dextrose 5 % 1 gram/250 mL IVPB 1,000 mg, 1,000 mg, Intravenous, On Call Procedure, Vianney Fountain, NP        Review of Systems   Constitutional: Negative. Negative for diaphoresis, malaise/fatigue and weight gain.   HENT: Negative.  Negative for hoarse voice.    Eyes: Negative.  Negative for double vision and visual disturbance.   Cardiovascular:  Negative for chest pain, claudication, cyanosis, dyspnea on exertion, irregular heartbeat, leg swelling, near-syncope, orthopnea, palpitations, paroxysmal nocturnal dyspnea and syncope.   Respiratory: Negative.  Negative for cough, shortness of breath and snoring.    Endocrine: Negative.    Hematologic/Lymphatic: Negative.  Negative for bleeding problem. Does not bruise/bleed easily.   Skin: Negative.  Negative for color change and poor wound healing.   Musculoskeletal: Negative.  Negative for muscle cramps, muscle weakness and myalgias.   Gastrointestinal: Negative.  Negative for bloating, abdominal pain, change in bowel habit, diarrhea, heartburn, hematemesis, hematochezia, melena and nausea.   Genitourinary: Negative.    Neurological: Negative.  Negative for excessive daytime sleepiness, dizziness, headaches, light-headedness, loss of balance, numbness and weakness.        HAS HOTTNESS IN BOTH FEET AND ARMS AND HANDS.    Psychiatric/Behavioral: Negative.  Negative for memory loss. The patient does not have insomnia.    Allergic/Immunologic: Negative.  Negative for hives.       Objective:   Physical Exam  Vitals and nursing note reviewed.   Constitutional:       General: She is not in acute distress.     Appearance: She is well-developed. She is not diaphoretic.   HENT:      Head: Normocephalic and atraumatic.      Nose: Nose normal.   Eyes:      General: No scleral icterus.     Conjunctiva/sclera: Conjunctivae normal.   Neck:      Thyroid: No thyromegaly.      Vascular: No JVD.   Cardiovascular:      Rate and Rhythm: Tachycardia  present. Rhythm irregular.      Heart sounds: S1 normal and S2 normal. No murmur heard.     No friction rub. No gallop. No S3 or S4 sounds.   Pulmonary:      Effort: Pulmonary effort is normal. No respiratory distress.      Breath sounds: Normal breath sounds. No stridor. No wheezing or rales.   Chest:      Chest wall: No tenderness.   Abdominal:      General: Bowel sounds are normal. There is no distension.      Palpations: Abdomen is soft. There is no mass.      Tenderness: There is no abdominal tenderness. There is no rebound.   Genitourinary:     Comments: Deferred  Musculoskeletal:         General: No tenderness or deformity. Normal range of motion.      Cervical back: Normal range of motion and neck supple.   Lymphadenopathy:      Cervical: No cervical adenopathy.   Skin:     General: Skin is warm and dry.      Coloration: Skin is not pale.      Findings: No erythema or rash.   Neurological:      Mental Status: She is alert and oriented to person, place, and time.      Motor: No abnormal muscle tone.      Coordination: Coordination normal.   Psychiatric:         Behavior: Behavior normal.         Thought Content: Thought content normal.         Judgment: Judgment normal.       ECG: Atypical AFL 2:1 conduction    Assessment:      1. Atrial flutter with rapid ventricular response        Plan:     89 yoF with atypical AFL after PVI and CTI with 2:1 conduction. She is clinically stable. I discussed CV and repeat ablation attempt however she would prefer to remain on her current therapy. RTC 6m

## 2023-11-29 ENCOUNTER — HOSPITAL ENCOUNTER (OUTPATIENT)
Dept: CARDIOLOGY | Facility: HOSPITAL | Age: 88
Discharge: HOME | End: 2023-11-29
Attending: INTERNAL MEDICINE
Payer: MEDICARE

## 2023-11-29 ENCOUNTER — OFFICE VISIT (OUTPATIENT)
Dept: CARDIOLOGY | Facility: CLINIC | Age: 88
End: 2023-11-29
Payer: MEDICARE

## 2023-11-29 DIAGNOSIS — I48.92 ATRIAL FLUTTER WITH RAPID VENTRICULAR RESPONSE: ICD-10-CM

## 2023-11-29 DIAGNOSIS — I11.9 HYPERTENSIVE LEFT VENTRICULAR HYPERTROPHY, WITHOUT HEART FAILURE: Chronic | ICD-10-CM

## 2023-11-29 PROCEDURE — 1160F RVW MEDS BY RX/DR IN RCRD: CPT | Mod: HCNC,CPTII,S$GLB, | Performed by: INTERNAL MEDICINE

## 2023-11-29 PROCEDURE — 1159F PR MEDICATION LIST DOCUMENTED IN MEDICAL RECORD: ICD-10-PCS | Mod: HCNC,CPTII,S$GLB, | Performed by: INTERNAL MEDICINE

## 2023-11-29 PROCEDURE — 93005 ELECTROCARDIOGRAM TRACING: CPT | Mod: HCNC

## 2023-11-29 PROCEDURE — 1160F PR REVIEW ALL MEDS BY PRESCRIBER/CLIN PHARMACIST DOCUMENTED: ICD-10-PCS | Mod: HCNC,CPTII,S$GLB, | Performed by: INTERNAL MEDICINE

## 2023-11-29 PROCEDURE — 93010 ELECTROCARDIOGRAM REPORT: CPT | Mod: HCNC,,, | Performed by: STUDENT IN AN ORGANIZED HEALTH CARE EDUCATION/TRAINING PROGRAM

## 2023-11-29 PROCEDURE — 99214 PR OFFICE/OUTPT VISIT, EST, LEVL IV, 30-39 MIN: ICD-10-PCS | Mod: HCNC,S$GLB,, | Performed by: INTERNAL MEDICINE

## 2023-11-29 PROCEDURE — 99999 PR PBB SHADOW E&M-EST. PATIENT-LVL III: ICD-10-PCS | Mod: PBBFAC,HCNC,, | Performed by: INTERNAL MEDICINE

## 2023-11-29 PROCEDURE — 99999 PR PBB SHADOW E&M-EST. PATIENT-LVL III: CPT | Mod: PBBFAC,HCNC,, | Performed by: INTERNAL MEDICINE

## 2023-11-29 PROCEDURE — 1159F MED LIST DOCD IN RCRD: CPT | Mod: HCNC,CPTII,S$GLB, | Performed by: INTERNAL MEDICINE

## 2023-11-29 PROCEDURE — 99214 OFFICE O/P EST MOD 30 MIN: CPT | Mod: HCNC,S$GLB,, | Performed by: INTERNAL MEDICINE

## 2023-11-29 PROCEDURE — 93010 EKG 12-LEAD: ICD-10-PCS | Mod: HCNC,,, | Performed by: STUDENT IN AN ORGANIZED HEALTH CARE EDUCATION/TRAINING PROGRAM

## 2023-11-30 ENCOUNTER — PATIENT MESSAGE (OUTPATIENT)
Dept: CARDIOLOGY | Facility: CLINIC | Age: 88
End: 2023-11-30
Payer: MEDICARE

## 2023-11-30 NOTE — TELEPHONE ENCOUNTER
Discussed with Dr. Ware pt's concerns about using Pred for her procedure. Dr. Ware recommended switching the pred for Ketorolac. Sent order for Ketorolac.

## 2023-12-01 ENCOUNTER — OFFICE VISIT (OUTPATIENT)
Dept: PRIMARY CARE CLINIC | Facility: CLINIC | Age: 88
End: 2023-12-01
Payer: MEDICARE

## 2023-12-01 VITALS
OXYGEN SATURATION: 96 % | TEMPERATURE: 98 F | HEART RATE: 124 BPM | HEIGHT: 64 IN | DIASTOLIC BLOOD PRESSURE: 74 MMHG | WEIGHT: 182.13 LBS | SYSTOLIC BLOOD PRESSURE: 122 MMHG | BODY MASS INDEX: 31.09 KG/M2

## 2023-12-01 DIAGNOSIS — N19 HYPERTENSIVE HEART AND RENAL DISEASE WITH BOTH (CONGESTIVE) HEART FAILURE AND RENAL FAILURE: ICD-10-CM

## 2023-12-01 DIAGNOSIS — Z79.01 CHRONIC ANTICOAGULATION: ICD-10-CM

## 2023-12-01 DIAGNOSIS — I48.92 ATRIAL FLUTTER WITH RAPID VENTRICULAR RESPONSE: Primary | ICD-10-CM

## 2023-12-01 DIAGNOSIS — I13.2 HYPERTENSIVE HEART AND RENAL DISEASE WITH BOTH (CONGESTIVE) HEART FAILURE AND RENAL FAILURE: ICD-10-CM

## 2023-12-01 PROCEDURE — 99999 PR PBB SHADOW E&M-EST. PATIENT-LVL IV: ICD-10-PCS | Mod: PBBFAC,HCNC,, | Performed by: NURSE PRACTITIONER

## 2023-12-01 PROCEDURE — 99999 PR PBB SHADOW E&M-EST. PATIENT-LVL IV: CPT | Mod: PBBFAC,HCNC,, | Performed by: NURSE PRACTITIONER

## 2023-12-01 PROCEDURE — 1160F PR REVIEW ALL MEDS BY PRESCRIBER/CLIN PHARMACIST DOCUMENTED: ICD-10-PCS | Mod: HCNC,CPTII,S$GLB, | Performed by: NURSE PRACTITIONER

## 2023-12-01 PROCEDURE — 3288F PR FALLS RISK ASSESSMENT DOCUMENTED: ICD-10-PCS | Mod: HCNC,CPTII,S$GLB, | Performed by: NURSE PRACTITIONER

## 2023-12-01 PROCEDURE — 1160F RVW MEDS BY RX/DR IN RCRD: CPT | Mod: HCNC,CPTII,S$GLB, | Performed by: NURSE PRACTITIONER

## 2023-12-01 PROCEDURE — 1159F MED LIST DOCD IN RCRD: CPT | Mod: HCNC,CPTII,S$GLB, | Performed by: NURSE PRACTITIONER

## 2023-12-01 PROCEDURE — 1101F PT FALLS ASSESS-DOCD LE1/YR: CPT | Mod: HCNC,CPTII,S$GLB, | Performed by: NURSE PRACTITIONER

## 2023-12-01 PROCEDURE — 99214 PR OFFICE/OUTPT VISIT, EST, LEVL IV, 30-39 MIN: ICD-10-PCS | Mod: HCNC,S$GLB,, | Performed by: NURSE PRACTITIONER

## 2023-12-01 PROCEDURE — 99214 OFFICE O/P EST MOD 30 MIN: CPT | Mod: HCNC,S$GLB,, | Performed by: NURSE PRACTITIONER

## 2023-12-01 PROCEDURE — 1159F PR MEDICATION LIST DOCUMENTED IN MEDICAL RECORD: ICD-10-PCS | Mod: HCNC,CPTII,S$GLB, | Performed by: NURSE PRACTITIONER

## 2023-12-01 PROCEDURE — 1101F PR PT FALLS ASSESS DOC 0-1 FALLS W/OUT INJ PAST YR: ICD-10-PCS | Mod: HCNC,CPTII,S$GLB, | Performed by: NURSE PRACTITIONER

## 2023-12-01 PROCEDURE — 3288F FALL RISK ASSESSMENT DOCD: CPT | Mod: HCNC,CPTII,S$GLB, | Performed by: NURSE PRACTITIONER

## 2023-12-01 NOTE — ASSESSMENT & PLAN NOTE
BP Readings from Last 3 Encounters:   12/01/23 122/74   11/03/23 (!) 116/58   10/30/23 137/78   Mild edema. Weight is stable. Continue POC and monitor.

## 2023-12-01 NOTE — PROGRESS NOTES
"Shirley Metz  12/01/2023  [id number]    Phylicia Deleon MD  Patient Care Team:  Phylicia Deleon MD as PCP - General (Internal Medicine)  Wilbert Ware MD as Consulting Physician (Ophthalmology)  Rajinder Quintanilla MD as Consulting Physician (Ophthalmology)  Rosario Valadez MD as Consulting Physician (Dermatology)  Amish Mendoza MD (Pulmonary Disease)  Jaquan Choi MD as Consulting Physician (Vascular Surgery)  Emmanuel Fish MD as Consulting Physician (Hand Surgery)  PAXTON Chaidez Jr., MD as Consulting Physician (Orthopedic Surgery)  Phylicia Deleon MD as Physician (Internal Medicine)  Heather Miller NP as Nurse Practitioner (Family Medicine)      [facility] Note      Chief Complaint:  Chief Complaint   Patient presents with    Follow-up     Pt is complaining of bilateral leg pain.        History of Present Illness:  HPI    89 year old female with significant medical history that includes Afib with RVR, glaucoma, pulmonary nodules, HTN, hypertensive heart disease, PAD, HFpEF, hyperparathyroidism.    Seen for one month follow up today. LOV discussed parameters for which to take additional diuretic and beta blocker.    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 She is clinically stable. I discussed CV and repeat ablation attempt however she would prefer to remain on her current therapy. RTC 6m     Not feeling well today. Legs tired and painful. Also some generalized pain. Increased effort to move BLEs. Chronic and intermittent. Some irritation to sciatic nerve on right, "twisted." Had RSV vacc 2 days ago. Dizziness yesterday, now resolved.      Continues 1.5 metoprolol BID, does not believe higher dose is necessary. Most recent EKG shows Atrial flutter.     Has concerns about taking metoprolol when she is allergic to timolol ("a day of gtts knocks me out"). Has eye procedure (stent) scheduled next week. " "Concerned because she may need to use prednisolone gtts.     No dyspnea, some mild BLE dyspnea. Got compression socks. Waiting until decreased leg pain to try wearing again.     Plans to see Dr Fish for "bumps" on wrist. Thought to be ganglion cyst.     Has appt to see Dr Knutson in January.         Review of Systems      The following were reviewed: Active problem list, medication list, allergies, family history, social history, and Health Maintenance.       Medications:  Current Outpatient Medications on File Prior to Visit   Medication Sig Dispense Refill    acetaminophen (TYLENOL) 500 MG tablet Take 500 mg by mouth nightly as needed.      apixaban (ELIQUIS) 2.5 mg Tab Take 1 tablet (2.5 mg total) by mouth 2 (two) times daily. 180 tablet 3    b complex vitamins capsule Take 1 capsule by mouth once daily.      brinzolamide (AZOPT) 1 % ophthalmic suspension Place 1 drop into the left eye 2 (two) times a day. 5 mL 3    cholecalciferol, vitamin D3, (VITAMIN D3) 50 mcg (2,000 unit) Cap Take 1 capsule by mouth once daily.      coenzyme Q10 200 mg capsule Take 200 mg by mouth once daily.      fish oil-omega-3 fatty acids 300-1,000 mg capsule Take 1 capsule by mouth once daily.      gatifloxacin 0.5 % Drop drops Place 1 drop into the left eye every 12 (twelve) hours. Start using the day before surgery 5 mL 1    metOLazone (ZAROXOLYN) 2.5 MG tablet Take 1 tablet (2.5 mg total) by mouth daily as needed. 30 tablet 2    metoprolol tartrate (LOPRESSOR) 25 MG tablet Take 1.5 tablets (37.5 mg total) by mouth 2 (two) times daily. 180 tablet 3    prednisoLONE acetate (PRED FORTE) 1 % DrpS Place 1 drop into the left eye 4 (four) times daily. Start using the day before surgery 5 mL 1    RHOPRESSA 0.02 % ophthalmic solution PLACE 1 DROP INTO THE LEFT EYE EVERY EVENING. 2.5 mL 10    TRAVATAN Z 0.004 % ophthalmic solution Place 1 drop into the left eye every evening. 2.5 mL 12    ALPRAZolam (XANAX) 0.5 MG tablet Take 1 tablet (0.5 " mg total) by mouth nightly as needed for Anxiety. 30 tablet 5    furosemide (LASIX) 20 MG tablet Take 1 tablet (20 mg total) by mouth 2 (two) times a day. 180 tablet 1    RSVPreF3 antigen-AS01E, PF, (AREXVY, PF,) 120 mcg/0.5 mL SusR vaccine Inject into the muscle. (Patient not taking: Reported on 12/1/2023) 0.5 mL 0    sars-cov-2, covid-19 pfizer omicron, (PFIZER COVID BIVAL,12Y UP,,PF,) 30 mcg/0.3 mL injection Inject into the muscle. (Patient not taking: Reported on 12/1/2023) 0.3 mL 0    [DISCONTINUED] cloNIDine (CATAPRES) 0.1 MG tablet Take 1 tablet (0.1 mg total) by mouth 2 (two) times daily as needed (systolic BP over 180). 20 tablet 1    [DISCONTINUED] isosorbide mononitrate (IMDUR) 30 MG 24 hr tablet Take 1 tablet (30 mg total) by mouth every evening. 90 tablet 1     Current Facility-Administered Medications on File Prior to Visit   Medication Dose Route Frequency Provider Last Rate Last Admin    sodium chloride 0.9% flush 3 mL  3 mL Intravenous PRN Steve Galarza PA-C        vancomycin in dextrose 5 % 1 gram/250 mL IVPB 1,000 mg  1,000 mg Intravenous On Call Procedure Vianney Fountain NP           Medications have been reviewed and reconciled with patient at visit today.    Barriers to medications present (no )    Fall since last office visit (no )      Exam:  Vitals:    12/01/23 1312   BP: 122/74   Pulse: (!) 124   Temp: 98 °F (36.7 °C)     Weight: 82.6 kg (182 lb 1.6 oz)   Body mass index is 31.26 kg/m².      BP Readings from Last 3 Encounters:   12/01/23 122/74   11/03/23 (!) 116/58   10/30/23 137/78     Wt Readings from Last 3 Encounters:   12/01/23 1312 82.6 kg (182 lb 1.6 oz)   11/03/23 1047 82.1 kg (181 lb 1.6 oz)   10/30/23 1511 80.6 kg (177 lb 11.1 oz)            Physical Exam  Constitutional:       General: She is not in acute distress.     Appearance: She is not ill-appearing.   HENT:      Mouth/Throat:      Mouth: Mucous membranes are moist.   Eyes:      General: No scleral  icterus.  Cardiovascular:      Rate and Rhythm: Tachycardia present. Rhythm irregular.      Heart sounds: Normal heart sounds.   Pulmonary:      Effort: Pulmonary effort is normal.      Breath sounds: Normal breath sounds.   Abdominal:      General: There is no distension.      Palpations: Abdomen is soft.      Tenderness: There is no abdominal tenderness.   Musculoskeletal:         General: Swelling (trace BLE) present.      Cervical back: Neck supple.   Skin:     General: Skin is warm and dry.   Neurological:      Mental Status: She is alert. Mental status is at baseline.   Psychiatric:         Mood and Affect: Mood normal.         Behavior: Behavior normal.         Laboratory Reviewed: (Yes)  Lab Results   Component Value Date    WBC 7.00 07/28/2023    HGB 12.2 07/28/2023    HCT 37.4 07/28/2023     07/28/2023    CHOL 170 08/04/2023    TRIG 116 08/04/2023    HDL 40 08/04/2023    ALT 14 07/28/2023    AST 19 07/28/2023     09/06/2023    K 4.1 09/06/2023     09/06/2023    CREATININE 1.6 (H) 09/06/2023    BUN 33 (H) 09/06/2023    CO2 23 09/06/2023    TSH 3.577 04/27/2023    INR 1.1 10/25/2022    HGBA1C 6.3 (H) 09/06/2023           Health Maintenance  Health Maintenance Topics with due status: Not Due       Topic Last Completion Date    Diabetes Urine Screening 03/17/2023    Lipid Panel 08/04/2023    Hemoglobin A1c 09/06/2023     Health Maintenance Due   Topic Date Due    Shingles Vaccine (1 of 2) Never done    Eye Exam  11/06/2021    TETANUS VACCINE  11/09/2021    COVID-19 Vaccine (5 - 2023-24 season) 09/01/2023           Assessment:  Problem List Items Addressed This Visit          Cardiac/Vascular    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 Weight is stable. Continue POC and monitor.            Atrial flutter with rapid ventricular  response - Primary     Followed by cardiology.   Continues DOAC, metoprolol. HR consistently elevated. Discussed with pt. Does not want change in POC or further discussion with cardiologist.             Hematology    Chronic anticoagulation     No sx of bleeding. Continues age-appropriate dosing of Eliquis.              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 Patient expressed understanding. Patient was given the opportunity to ask questions and be an active participant in their medical care. Patient had no further questions or concerns at this time.   -Documentation of patient's health and condition was obtained from family member who was present during visit.  -Patient is an overall moderate risk for health complications from their medical conditions.       Follow up: Follow up in about 2 months (around 2/1/2024).      After visit summary printed and given to patient upon discharge.  Patient goals and care plan are included in After visit summary.    Total medical decision making time was 38 min.  The following issues were discussed: The primary encounter diagnosis was Atrial flutter with rapid ventricular response. 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    Health maintenance needs, recent test results and goals of care discussed with pt and questions answered.

## 2023-12-01 NOTE — PATIENT INSTRUCTIONS
If you are feeling unwell, we'd like to be the first ones to know here at [facility]! Please give us a call. Same day appointments are our top priority to keep you well and out of the emergency rooms and hospitals. Call [phone] for our direct line. After hours advice is always available. Please call [phone] after hours to speak to the on-call team.

## 2023-12-01 NOTE — ASSESSMENT & PLAN NOTE
Followed by cardiology.   Continues DOAC, metoprolol. HR consistently elevated. Discussed with pt. Does not want change in POC or further discussion with cardiologist.

## 2023-12-04 RX ORDER — KETOROLAC TROMETHAMINE 5 MG/ML
1 SOLUTION OPHTHALMIC 4 TIMES DAILY
Qty: 5 ML | Refills: 1 | Status: SHIPPED | OUTPATIENT
Start: 2023-12-04 | End: 2024-01-30

## 2023-12-04 RX ORDER — NETARSUDIL 0.2 MG/ML
1 SOLUTION/ DROPS OPHTHALMIC; TOPICAL NIGHTLY
Qty: 2.5 ML | Refills: 10 | Status: SHIPPED | OUTPATIENT
Start: 2023-12-04

## 2023-12-05 DIAGNOSIS — H40.1131 PRIMARY OPEN ANGLE GLAUCOMA (POAG) OF BOTH EYES, MILD STAGE: ICD-10-CM

## 2023-12-06 ENCOUNTER — OUTSIDE PLACE OF SERVICE (OUTPATIENT)
Dept: OPHTHALMOLOGY | Facility: CLINIC | Age: 88
End: 2023-12-06
Payer: MEDICARE

## 2023-12-06 PROCEDURE — 66183 INSERT ANT DRAINAGE DEVICE: CPT | Mod: LT,,, | Performed by: OPHTHALMOLOGY

## 2023-12-06 RX ORDER — TRAVOPROST 0.04 MG/ML
1 SOLUTION/ DROPS OPHTHALMIC NIGHTLY
Qty: 2.5 ML | Refills: 12 | Status: SHIPPED | OUTPATIENT
Start: 2023-12-06 | End: 2024-01-29

## 2023-12-07 ENCOUNTER — OFFICE VISIT (OUTPATIENT)
Dept: OPHTHALMOLOGY | Facility: CLINIC | Age: 88
End: 2023-12-07
Payer: MEDICARE

## 2023-12-07 DIAGNOSIS — Z98.890 POST-OPERATIVE STATE: Primary | ICD-10-CM

## 2023-12-07 PROCEDURE — 99999 PR PBB SHADOW E&M-EST. PATIENT-LVL III: ICD-10-PCS | Mod: PBBFAC,HCNC,, | Performed by: OPHTHALMOLOGY

## 2023-12-07 PROCEDURE — 99024 POSTOP FOLLOW-UP VISIT: CPT | Mod: HCNC,S$GLB,, | Performed by: OPHTHALMOLOGY

## 2023-12-07 PROCEDURE — 1160F RVW MEDS BY RX/DR IN RCRD: CPT | Mod: HCNC,CPTII,S$GLB, | Performed by: OPHTHALMOLOGY

## 2023-12-07 PROCEDURE — 99024 PR POST-OP FOLLOW-UP VISIT: ICD-10-PCS | Mod: HCNC,S$GLB,, | Performed by: OPHTHALMOLOGY

## 2023-12-07 PROCEDURE — 1160F PR REVIEW ALL MEDS BY PRESCRIBER/CLIN PHARMACIST DOCUMENTED: ICD-10-PCS | Mod: HCNC,CPTII,S$GLB, | Performed by: OPHTHALMOLOGY

## 2023-12-07 PROCEDURE — 1159F PR MEDICATION LIST DOCUMENTED IN MEDICAL RECORD: ICD-10-PCS | Mod: HCNC,CPTII,S$GLB, | Performed by: OPHTHALMOLOGY

## 2023-12-07 PROCEDURE — 1159F MED LIST DOCD IN RCRD: CPT | Mod: HCNC,CPTII,S$GLB, | Performed by: OPHTHALMOLOGY

## 2023-12-07 PROCEDURE — 99999 PR PBB SHADOW E&M-EST. PATIENT-LVL III: CPT | Mod: PBBFAC,HCNC,, | Performed by: OPHTHALMOLOGY

## 2023-12-07 RX ORDER — LOTEPREDNOL ETABONATE 5 MG/ML
1 SUSPENSION/ DROPS OPHTHALMIC 2 TIMES DAILY
Qty: 10 ML | Refills: 3 | Status: SHIPPED | OUTPATIENT
Start: 2023-12-07 | End: 2023-12-20

## 2023-12-07 NOTE — PROGRESS NOTES
SUBJECTIVE  Shirley Metz is 89 y.o. female  Corrected distance visual acuity was not recorded in the right eye and 20/60 +2 in the left eye.   Chief Complaint   Patient presents with    Post-op Evaluation     Xen Gel OS 12/06/23    Patient stated she had some discomfort and irritation last night           HPI     Post-op Evaluation     Additional comments: Xen Gel OS 12/06/23    Patient stated she had some discomfort and irritation last night            Comments    Likes ONL 1 pm  Lives at [facility] sx on 8/21/18     1. Mild COAG Goal < 19   SLT OD 3/04 (20 to 15) + 3/11 (19 to 15)   SLT OS 5/05 (20 to 14) +5/11 (18-19 to 15) + 3/12 (min resp.) + 3/11/15   (20.5-17.5) + 3/7/18 (24- 21) + 11/3/21 (22.5-17.5)  (Cosopt, Xalatan, and Timolol cause Myalgias)   (Alphagan causes redness) (zioptan too expensive)   Possible steroid responder   Xen Gel OS 12/06/23  2. PCIOL OU   3. Mild Dry AMD   4. Fuch's Dystrophy   DSAEK OD 4/16 Dr. Quintanilla (followed by [facility] every summer)   DSAEK OS 8/15 Dr. Quintanilla   Rx Inveltys (1% lotemax) (IOP 15/23) 5/29/20   Dr. Quintanilla Ok'd to stop Lotemax OU  5. Dry Eye -intolerance to Restasis   6. Recent A-Fib. (from Labetolol ?)       Systane Ultra 4-5 tiimes daily   Travatan Z qhs os   Rhopressa qhs OS   Azopt qam OS    +HAG             Last edited by Bonita Petersen, Patient Care Assistant on 12/7/2023  8:21   AM.         Assessment /Plan :  1. Post-operative state S/p 1 day Xen gel OS, doing well    Instructed post surgical precautions including eye shield and physical restrictions    Discontinue all glaucoma gtts    Continue:   Gatifloxacin BID OS  Ketorolac QID OS    Begin:  Lotemax BID OS    Return to clinic on Monday or sooner PRN

## 2023-12-11 ENCOUNTER — OFFICE VISIT (OUTPATIENT)
Dept: OPHTHALMOLOGY | Facility: CLINIC | Age: 88
End: 2023-12-11
Payer: MEDICARE

## 2023-12-11 DIAGNOSIS — Z98.890 POST-OPERATIVE STATE: Primary | ICD-10-CM

## 2023-12-11 DIAGNOSIS — F41.9 ANXIETY: ICD-10-CM

## 2023-12-11 PROCEDURE — 99999 PR PBB SHADOW E&M-EST. PATIENT-LVL III: ICD-10-PCS | Mod: PBBFAC,,, | Performed by: OPHTHALMOLOGY

## 2023-12-11 PROCEDURE — 99024 POSTOP FOLLOW-UP VISIT: CPT | Mod: HCNC,S$GLB,, | Performed by: OPHTHALMOLOGY

## 2023-12-11 PROCEDURE — 1159F PR MEDICATION LIST DOCUMENTED IN MEDICAL RECORD: ICD-10-PCS | Mod: HCNC,CPTII,S$GLB, | Performed by: OPHTHALMOLOGY

## 2023-12-11 PROCEDURE — 99999 PR PBB SHADOW E&M-EST. PATIENT-LVL III: CPT | Mod: PBBFAC,,, | Performed by: OPHTHALMOLOGY

## 2023-12-11 PROCEDURE — 1126F PR PAIN SEVERITY QUANTIFIED, NO PAIN PRESENT: ICD-10-PCS | Mod: HCNC,CPTII,S$GLB, | Performed by: OPHTHALMOLOGY

## 2023-12-11 PROCEDURE — 99024 PR POST-OP FOLLOW-UP VISIT: ICD-10-PCS | Mod: HCNC,S$GLB,, | Performed by: OPHTHALMOLOGY

## 2023-12-11 PROCEDURE — 1126F AMNT PAIN NOTED NONE PRSNT: CPT | Mod: HCNC,CPTII,S$GLB, | Performed by: OPHTHALMOLOGY

## 2023-12-11 PROCEDURE — 1160F PR REVIEW ALL MEDS BY PRESCRIBER/CLIN PHARMACIST DOCUMENTED: ICD-10-PCS | Mod: HCNC,CPTII,S$GLB, | Performed by: OPHTHALMOLOGY

## 2023-12-11 PROCEDURE — 1159F MED LIST DOCD IN RCRD: CPT | Mod: HCNC,CPTII,S$GLB, | Performed by: OPHTHALMOLOGY

## 2023-12-11 PROCEDURE — 1160F RVW MEDS BY RX/DR IN RCRD: CPT | Mod: HCNC,CPTII,S$GLB, | Performed by: OPHTHALMOLOGY

## 2023-12-11 RX ORDER — ALPRAZOLAM 0.5 MG/1
0.5 TABLET ORAL NIGHTLY PRN
Qty: 30 TABLET | Refills: 4 | Status: SHIPPED | OUTPATIENT
Start: 2023-12-11 | End: 2024-06-08

## 2023-12-11 NOTE — PROGRESS NOTES
SUBJECTIVE  AdrianaIfrah Metz is 89 y.o. female  Corrected distance visual acuity was not recorded in the right eye and 20/80 in the left eye.   Chief Complaint   Patient presents with    Post-op Evaluation     Post op visit Xen gel OS 12/06/23. VA is blurry in both eyes. No pain or irritation. Compliant with gtts.          HPI     Post-op Evaluation     Additional comments: Post op visit Xen gel OS 12/06/23. VA is blurry in   both eyes. No pain or irritation. Compliant with gtts.           Comments    Likes ONL 1 pm  Lives at [facility] sx on 8/21/18     1. Mild COAG Goal < 19   SLT OD 3/04 (20 to 15) + 3/11 (19 to 15)   SLT OS 5/05 (20 to 14) +5/11 (18-19 to 15) + 3/12 (min resp.) + 3/11/15   (20.5-17.5) + 3/7/18 (24- 21) + 11/3/21 (22.5-17.5)  (Cosopt, Xalatan, and Timolol cause Myalgias)   (Alphagan causes redness) (zioptan too expensive)   Possible steroid responder   Xen Gel OS 12/06/23  2. PCIOL OU   3. Mild Dry AMD   4. Fuch's Dystrophy   DSAEK OD 4/16 Dr. Quintanilla (followed by [facility] every summer)   DSAEK OS 8/15 Dr. Quintanilla   Rx Inveltys (1% lotemax) (IOP 15/23) 5/29/20   Dr. Quintanilla Ok'd to stop Lotemax OU  5. Dry Eye -intolerance to Restasis   6. Recent A-Fib. (from Labetolol ?)       Systane Ultra 4-5 tiimes daily   Travatan Z qhs os Discontinue  Rhopressa qhs OS Discontinue  Azopt qam OS Discontinue    OS: Gati BID & Keto QID    Lotemax BID OS  +HAG             Last edited by Jairo Esposito on 12/11/2023 11:27 AM.         Assessment /Plan :  1. Post-operative state 5 day S/p Xen Gel OS PO, doing well    Continue Gatifloxacin BID & Ketorolac QID OS    Instructed to begin massaging OS for about 30 seconds QID    Return to clinic in 1 week or sooner PRN

## 2023-12-14 ENCOUNTER — TELEPHONE (OUTPATIENT)
Dept: OPHTHALMOLOGY | Facility: CLINIC | Age: 88
End: 2023-12-14
Payer: MEDICARE

## 2023-12-14 NOTE — TELEPHONE ENCOUNTER
Called patient back. Dr. Ware would like her to take it to help the and prevent scarring but if she can not tolerate it than she can stop. She needs to continue to take Ketoralac.     ----- Message from Erin Simms sent at 12/14/2023  9:24 AM CST -----  Contact: patient  [phone]  Patient called requesting a urgent call back from Dr. Ware or his nurse, regarding a allergic reaction to medication Lotemax

## 2023-12-18 ENCOUNTER — OFFICE VISIT (OUTPATIENT)
Dept: OPHTHALMOLOGY | Facility: CLINIC | Age: 88
End: 2023-12-18
Payer: MEDICARE

## 2023-12-18 DIAGNOSIS — Z98.890 POST-OPERATIVE STATE: Primary | ICD-10-CM

## 2023-12-18 PROCEDURE — 99999 PR PBB SHADOW E&M-EST. PATIENT-LVL III: ICD-10-PCS | Mod: PBBFAC,HCNC,, | Performed by: OPHTHALMOLOGY

## 2023-12-18 PROCEDURE — 1159F MED LIST DOCD IN RCRD: CPT | Mod: HCNC,CPTII,S$GLB, | Performed by: OPHTHALMOLOGY

## 2023-12-18 PROCEDURE — 99024 PR POST-OP FOLLOW-UP VISIT: ICD-10-PCS | Mod: HCNC,S$GLB,, | Performed by: OPHTHALMOLOGY

## 2023-12-18 PROCEDURE — 99999 PR PBB SHADOW E&M-EST. PATIENT-LVL III: CPT | Mod: PBBFAC,HCNC,, | Performed by: OPHTHALMOLOGY

## 2023-12-18 PROCEDURE — 1159F PR MEDICATION LIST DOCUMENTED IN MEDICAL RECORD: ICD-10-PCS | Mod: HCNC,CPTII,S$GLB, | Performed by: OPHTHALMOLOGY

## 2023-12-18 PROCEDURE — 99024 POSTOP FOLLOW-UP VISIT: CPT | Mod: HCNC,S$GLB,, | Performed by: OPHTHALMOLOGY

## 2023-12-18 PROCEDURE — 1160F PR REVIEW ALL MEDS BY PRESCRIBER/CLIN PHARMACIST DOCUMENTED: ICD-10-PCS | Mod: HCNC,CPTII,S$GLB, | Performed by: OPHTHALMOLOGY

## 2023-12-18 PROCEDURE — 1160F RVW MEDS BY RX/DR IN RCRD: CPT | Mod: HCNC,CPTII,S$GLB, | Performed by: OPHTHALMOLOGY

## 2023-12-18 NOTE — PROGRESS NOTES
SUBJECTIVE  AdrianaIfrah Metz is 89 y.o. female  Corrected distance visual acuity was not recorded in the right eye and 20/60 in the left eye.   Chief Complaint   Patient presents with    Post-op Evaluation     Pt here for 1 wk Xen gel OS PO. No pain or discomfort. VA stable. Pt had to stop the Lotemax due to severe allergic reaction. 100% compliant with Gatifloxacin and ketorolac.           HPI     Post-op Evaluation     Additional comments: Pt here for 1 wk Xen gel OS PO. No pain or   discomfort. VA stable. Pt had to stop the Lotemax due to severe allergic   reaction. 100% compliant with Gatifloxacin and ketorolac.            Comments    1. Mild COAG Goal < 19   SLT OD 3/04 (20 to 15) + 3/11 (19 to 15)   SLT OS 5/05 (20 to 14) +5/11 (18-19 to 15) + 3/12 (min resp.) + 3/11/15   (20.5-17.5) + 3/7/18 (24- 21) + 11/3/21 (22.5-17.5)  (Cosopt, Xalatan, and Timolol cause Myalgias)   (Alphagan causes redness) (zioptan too expensive)   Possible steroid responder   Xen Gel OS 12/06/23  2. PCIOL OU   3. Mild Dry AMD   4. Fuch's Dystrophy   DSAEK OD 4/16 Dr. Quintanilla (followed by [facility] every summer)   DSAEK OS 8/15 Dr. Quintanilla   Rx Inveltys (1% lotemax) (IOP 15/23) 5/29/20   Dr. Quintanilla Ok'd to stop Lotemax OU  5. Dry Eye -intolerance to Restasis   6. Recent A-Fib. (from Labetolol ?)     Lotemax BID OS - Allergic, very weak, pain throughout body    Systane Ultra 4-5 tiimes daily   Travatan Z qhs os Discontinue  Rhopressa qhs OS Discontinue  Azopt qam OS Discontinue    OS: Gati BID & Keto QID            Last edited by Carmelo Banegas MA on 12/18/2023 10:34 AM.         Assessment /Plan :  1. Post-operative state s/p Xen gel OS    Scarring starting to develop due to inability to use steroids.  Instructed patient to begin massaging Q1H for 45-60 seconds. Iterated strict compliance with massage.    Discontinue Gatifloxacin and Lotemax    Continue Ketorolac QID OS    Return to clinic in 2 weeks or sooner PRN

## 2023-12-20 ENCOUNTER — OFFICE VISIT (OUTPATIENT)
Dept: PRIMARY CARE CLINIC | Facility: CLINIC | Age: 88
End: 2023-12-20
Payer: MEDICARE

## 2023-12-20 ENCOUNTER — OFFICE VISIT (OUTPATIENT)
Dept: ORTHOPEDICS | Facility: CLINIC | Age: 88
End: 2023-12-20
Payer: MEDICARE

## 2023-12-20 ENCOUNTER — TELEPHONE (OUTPATIENT)
Dept: PRIMARY CARE CLINIC | Facility: CLINIC | Age: 88
End: 2023-12-20
Payer: MEDICARE

## 2023-12-20 VITALS
BODY MASS INDEX: 31.27 KG/M2 | TEMPERATURE: 98 F | HEART RATE: 126 BPM | SYSTOLIC BLOOD PRESSURE: 112 MMHG | OXYGEN SATURATION: 96 % | HEIGHT: 64 IN | DIASTOLIC BLOOD PRESSURE: 68 MMHG | WEIGHT: 183.19 LBS

## 2023-12-20 VITALS — WEIGHT: 182 LBS | BODY MASS INDEX: 31.07 KG/M2 | HEIGHT: 64 IN

## 2023-12-20 DIAGNOSIS — M11.839 CALCIUM PYROPHOSPHATE DEPOSITION DISEASE (CPDD) OF WRIST: ICD-10-CM

## 2023-12-20 DIAGNOSIS — I50.33 ACUTE ON CHRONIC DIASTOLIC CONGESTIVE HEART FAILURE: ICD-10-CM

## 2023-12-20 DIAGNOSIS — I13.2 HYPERTENSIVE HEART AND RENAL DISEASE WITH BOTH (CONGESTIVE) HEART FAILURE AND RENAL FAILURE: Primary | ICD-10-CM

## 2023-12-20 DIAGNOSIS — I48.92 ATRIAL FLUTTER WITH RAPID VENTRICULAR RESPONSE: ICD-10-CM

## 2023-12-20 DIAGNOSIS — N19 HYPERTENSIVE HEART AND RENAL DISEASE WITH BOTH (CONGESTIVE) HEART FAILURE AND RENAL FAILURE: Primary | ICD-10-CM

## 2023-12-20 DIAGNOSIS — M67.432 GANGLION OF LEFT WRIST: Primary | ICD-10-CM

## 2023-12-20 PROCEDURE — 1159F MED LIST DOCD IN RCRD: CPT | Mod: HCNC,CPTII,S$GLB, | Performed by: ORTHOPAEDIC SURGERY

## 2023-12-20 PROCEDURE — 99215 OFFICE O/P EST HI 40 MIN: CPT | Mod: HCNC,S$GLB,, | Performed by: NURSE PRACTITIONER

## 2023-12-20 PROCEDURE — 1101F PT FALLS ASSESS-DOCD LE1/YR: CPT | Mod: HCNC,CPTII,S$GLB, | Performed by: NURSE PRACTITIONER

## 2023-12-20 PROCEDURE — 3288F FALL RISK ASSESSMENT DOCD: CPT | Mod: HCNC,CPTII,S$GLB, | Performed by: ORTHOPAEDIC SURGERY

## 2023-12-20 PROCEDURE — 99213 PR OFFICE/OUTPT VISIT, EST, LEVL III, 20-29 MIN: ICD-10-PCS | Mod: HCNC,S$GLB,, | Performed by: ORTHOPAEDIC SURGERY

## 2023-12-20 PROCEDURE — 3288F FALL RISK ASSESSMENT DOCD: CPT | Mod: HCNC,CPTII,S$GLB, | Performed by: NURSE PRACTITIONER

## 2023-12-20 PROCEDURE — 1101F PR PT FALLS ASSESS DOC 0-1 FALLS W/OUT INJ PAST YR: ICD-10-PCS | Mod: HCNC,CPTII,S$GLB, | Performed by: ORTHOPAEDIC SURGERY

## 2023-12-20 PROCEDURE — 3288F PR FALLS RISK ASSESSMENT DOCUMENTED: ICD-10-PCS | Mod: HCNC,CPTII,S$GLB, | Performed by: ORTHOPAEDIC SURGERY

## 2023-12-20 PROCEDURE — 1101F PT FALLS ASSESS-DOCD LE1/YR: CPT | Mod: HCNC,CPTII,S$GLB, | Performed by: ORTHOPAEDIC SURGERY

## 2023-12-20 PROCEDURE — 99999 PR PBB SHADOW E&M-EST. PATIENT-LVL III: ICD-10-PCS | Mod: PBBFAC,,, | Performed by: ORTHOPAEDIC SURGERY

## 2023-12-20 PROCEDURE — 1125F PR PAIN SEVERITY QUANTIFIED, PAIN PRESENT: ICD-10-PCS | Mod: HCNC,CPTII,S$GLB, | Performed by: ORTHOPAEDIC SURGERY

## 2023-12-20 PROCEDURE — 99999 PR PBB SHADOW E&M-EST. PATIENT-LVL III: CPT | Mod: PBBFAC,,, | Performed by: ORTHOPAEDIC SURGERY

## 2023-12-20 PROCEDURE — 1159F PR MEDICATION LIST DOCUMENTED IN MEDICAL RECORD: ICD-10-PCS | Mod: HCNC,CPTII,S$GLB, | Performed by: ORTHOPAEDIC SURGERY

## 2023-12-20 PROCEDURE — 1160F RVW MEDS BY RX/DR IN RCRD: CPT | Mod: HCNC,CPTII,S$GLB, | Performed by: ORTHOPAEDIC SURGERY

## 2023-12-20 PROCEDURE — 1125F AMNT PAIN NOTED PAIN PRSNT: CPT | Mod: HCNC,CPTII,S$GLB, | Performed by: ORTHOPAEDIC SURGERY

## 2023-12-20 PROCEDURE — 99999 PR PBB SHADOW E&M-EST. PATIENT-LVL IV: CPT | Mod: PBBFAC,HCNC,, | Performed by: NURSE PRACTITIONER

## 2023-12-20 PROCEDURE — 1160F PR REVIEW ALL MEDS BY PRESCRIBER/CLIN PHARMACIST DOCUMENTED: ICD-10-PCS | Mod: HCNC,CPTII,S$GLB, | Performed by: ORTHOPAEDIC SURGERY

## 2023-12-20 PROCEDURE — 99213 OFFICE O/P EST LOW 20 MIN: CPT | Mod: HCNC,S$GLB,, | Performed by: ORTHOPAEDIC SURGERY

## 2023-12-20 RX ORDER — FUROSEMIDE 40 MG/1
40 TABLET ORAL 2 TIMES DAILY
COMMUNITY
End: 2024-04-08

## 2023-12-20 NOTE — PROGRESS NOTES
Subjective:     Patient ID: Shirley Metz is a 89 y.o. female.    Chief Complaint: Pain of the Right Wrist and Pain of the Left Wrist      HPI:  The patient is a 89-year-old female with calcium pyrophosphate deposition disease bilateral triangular fibrocartilage complex areas.  She has developed a ganglion cyst coming from the ulnar aspect of the left wrist.  She declines injection.  She wishes to avoid surgery.  She wishes new wrist splints.    Past Medical History:   Diagnosis Date    Anemia 11/29/2018    Anxiety 11/28/2017    Arthritis     Atrial fibrillation with RVR 10/9/2019    Back pain     Basal cell carcinoma 03/29/2018    left mid back    Chronic diastolic congestive heart failure 10/15/2019    CKD (chronic kidney disease) stage 3, GFR 30-59 ml/min 8/8/2016    Colon polyps     reported per patient.     Coronary artery calcification 10/5/2021    Fuchs' corneal dystrophy     General anesthetics causing adverse effect in therapeutic use     woke up during surgery and gagged on ET tube    Glaucoma     Glaucoma     Heart failure with preserved left ventricular function (HFpEF) 7/24/2018    Hyperlipidemia     Hypertension     Macular degeneration dry    Obesity     Pre-diabetes 12/4/2017    PVD (peripheral vascular disease) 4/26/2021    PVD (peripheral vascular disease) 4/26/2021    Squamous cell carcinoma 01/08/2019    left shoulder    Stenosis of carotid artery 10/5/2021    Trouble in sleeping     Type 2 diabetes mellitus without complication, without long-term current use of insulin 2/24/2021    Urinary tract infection      Past Surgical History:   Procedure Laterality Date    ABLATION OF ARRHYTHMOGENIC FOCUS FOR ATRIAL FIBRILLATION N/A 11/3/2022    Procedure: Ablation atrial fibrillation;  Surgeon: Toi Kelly MD;  Location: [facility];  Service: Cardiology;  Laterality: N/A;  afib, PVI/CTI, RFA, STACEY (cx if SR), DEDE, ABDIEL orantes, 3 Prep    ADENOIDECTOMY  1938    BREAST BIOPSY Left     1997.  benign    BREAST CYST EXCISION Left 1997 approx    CARPAL TUNNEL RELEASE Right 2012 approx    CATARACT EXTRACTION Bilateral 1994    CHOLECYSTECTOMY  1975    CORNEAL TRANSPLANT Bilateral 08/2015 8/2015 - left; Right 4/2016    EYE SURGERY      Fuch's Corneal dystrophy - had 2nd operation with Dr. Quintanilla    EYE SURGERY      Cataract wIOL    left thumb Left 2011    removed cuboid for arthritis    REVERSE TOTAL SHOULDER ARTHROPLASTY Left 8/21/2018    Procedure: ARTHROPLASTY, SHOULDER, TOTAL, REVERSE;  Surgeon: Solomon Lujan MD;  Location: [facility] OR;  Service: Orthopedics;  Laterality: Left;  WITH BICEP TENODESIS    SKIN CANCER EXCISION Left 2017    BCC removal by Dr. Valadez    SLT- OS (aka TRABECULOPLASTY) Left 05/11/2011    repeat 3/14/12    TENOTOMY Left 8/21/2018    Procedure: TENOTOMY;  Surgeon: Solomon Lujan MD;  Location: [facility] OR;  Service: Orthopedics;  Laterality: Left;    TONSILLECTOMY  1938    TRANSESOPHAGEAL ECHOCARDIOGRAM WITH POSSIBLE CARDIOVERSION (STACEY W/ POSS CARDIOVERSION) N/A 3/21/2023    Procedure: Transesophageal echo (STACEY) intra-procedure log documentation;  Surgeon: Trevon Ramirez MD;  Location: [facility] CATH LAB;  Service: Cardiology;  Laterality: N/A;    TREATMENT OF CARDIAC ARRHYTHMIA N/A 10/10/2019    Procedure: CARDIOVERSION;  Surgeon: Pete Durán MD;  Location: [facility] CATH LAB;  Service: Cardiology;  Laterality: N/A;    TREATMENT OF CARDIAC ARRHYTHMIA N/A 12/6/2019    Procedure: CARDIOVERSION;  Surgeon: Samy Castillo MD;  Location: [facility] CATH LAB;  Service: Cardiology;  Laterality: N/A;     Family History   Problem Relation Age of Onset    Heart disease Mother     Hypertension Mother     Cancer Father 88        sarcoma( ?[place]'s ) on leg    Deep vein thrombosis Neg Hx     Ovarian cancer Neg Hx     Breast cancer Neg Hx     Kidney disease Neg Hx     Strabismus Neg Hx     Retinal detachment Neg Hx     Macular degeneration Neg Hx     Glaucoma Neg Hx     Blindness Neg Hx     Amblyopia Neg Hx      Eczema Neg Hx     Lupus Neg Hx     Melanoma Neg Hx     Psoriasis Neg Hx     Diabetes Neg Hx     Stroke Neg Hx     Intellectual disability Neg Hx     Mental illness Neg Hx     Hyperlipidemia Neg Hx     COPD Neg Hx     Asthma Neg Hx     Depression Neg Hx     Alcohol abuse Neg Hx     Drug abuse Neg Hx      Social History     Socioeconomic History    Marital status:     Number of children: 3   Tobacco Use    Smoking status: Never    Smokeless tobacco: Never    Tobacco comments:     history of passive smoking from her ex-   Substance and Sexual Activity    Alcohol use: Yes     Alcohol/week: 2.0 standard drinks of alcohol     Types: 2 Standard drinks or equivalent per week     Comment: occ    Drug use: No    Sexual activity: Not Currently     Partners: Male     Birth control/protection: Post-menopausal     Comment: discussed protection for STD's   Other Topics Concern    Are you pregnant or think you may be? No    Breast-feeding No   Social History Narrative     x 2,  x 1. Retired artist - previously doing jewelry/wall pieces; ; lives in [facility]; has 3 sons - 52( lives in [place], 54, and 56;exercises minimally - limited due to back issues. Still drives. Does have a Living Will.     Social Determinants of Health     Financial Resource Strain: Low Risk  (9/21/2023)    Overall Financial Resource Strain (CARDIA)     Difficulty of Paying Living Expenses: Not hard at all   Food Insecurity: No Food Insecurity (10/17/2023)    Hunger Vital Sign     Worried About Running Out of Food in the Last Year: Never true     Ran Out of Food in the Last Year: Never true   Transportation Needs: No Transportation Needs (10/17/2023)    PRAPARE - Transportation     Lack of Transportation (Medical): No     Lack of Transportation (Non-Medical): No   Physical Activity: Unknown (10/17/2023)    Exercise Vital Sign     Days of Exercise per Week: Patient declined     Minutes of Exercise per  Session: 0 min   Recent Concern: Physical Activity - Inactive (9/21/2023)    Exercise Vital Sign     Days of Exercise per Week: 0 days     Minutes of Exercise per Session: 0 min   Stress: No Stress Concern Present (10/17/2023)    [place] [employer] [place] [employer] - Occupational Stress Questionnaire     Feeling of Stress : Only a little   Social Connections: Unknown (10/17/2023)    Social Connection and Isolation Panel [NHANES]     Frequency of Communication with Friends and Family: More than three times a week     Frequency of Social Gatherings with Friends and Family: More than three times a week     Active Member of Clubs or Organizations: Yes     Attends Club or Organization Meetings: More than 4 times per year     Marital Status:    Housing Stability: Low Risk  (10/17/2023)    Housing Stability Vital Sign     Unable to Pay for Housing in the Last Year: No     Number of Places Lived in the Last Year: 1     Unstable Housing in the Last Year: No     Medication List with Changes/Refills   Current Medications    ACETAMINOPHEN (TYLENOL) 500 MG TABLET    Take 500 mg by mouth nightly as needed.    ALPRAZOLAM (XANAX) 0.5 MG TABLET    TAKE 1 TABLET (0.5 MG TOTAL) BY MOUTH NIGHTLY AS NEEDED FOR ANXIETY.    APIXABAN (ELIQUIS) 2.5 MG TAB    Take 1 tablet (2.5 mg total) by mouth 2 (two) times daily.    B COMPLEX VITAMINS CAPSULE    Take 1 capsule by mouth once daily.    BRINZOLAMIDE (AZOPT) 1 % OPHTHALMIC SUSPENSION    Place 1 drop into the left eye 2 (two) times a day.    CHOLECALCIFEROL, VITAMIN D3, (VITAMIN D3) 50 MCG (2,000 UNIT) CAP    Take 1 capsule by mouth once daily.    COENZYME Q10 200 MG CAPSULE    Take 200 mg by mouth once daily.    FISH OIL-OMEGA-3 FATTY ACIDS 300-1,000 MG CAPSULE    Take 1 capsule by mouth once daily.    FUROSEMIDE (LASIX) 20 MG TABLET    Take 1 tablet (20 mg total) by mouth 2 (two) times a day.    GATIFLOXACIN 0.5 % DROP DROPS    Place 1 drop into the left eye every 12 (twelve)  hours. Start using the day before surgery    KETOROLAC 0.5% (ACULAR) 0.5 % DROP    Place 1 drop into the left eye 4 (four) times daily. Start drop the day before surgery.    LOTEPREDNOL (LOTEMAX) 0.5 % OPHTHALMIC SUSPENSION    Place 1 drop into the left eye 2 (two) times a day.    METOLAZONE (ZAROXOLYN) 2.5 MG TABLET    Take 1 tablet (2.5 mg total) by mouth daily as needed.    METOPROLOL TARTRATE (LOPRESSOR) 25 MG TABLET    Take 1.5 tablets (37.5 mg total) by mouth 2 (two) times daily.    NETARSUDIL (RHOPRESSA) 0.02 % OPHTHALMIC SOLUTION    Place 1 drop into the left eye every evening.    PREDNISOLONE ACETATE (PRED FORTE) 1 % DRPS    Place 1 drop into the left eye 4 (four) times daily. Start using the day before surgery    RSVPREF3 ANTIGEN-AS01E, PF, (AREXVY, PF,) 120 MCG/0.5 ML SUSR VACCINE    Inject into the muscle.    SARS-COV-2, COVID-19 PFIZER OMICRON, (PFIZER COVID BIVAL,12Y UP,,PF,) 30 MCG/0.3 ML INJECTION    Inject into the muscle.    TRAVATAN Z 0.004 % OPHTHALMIC SOLUTION    Place 1 drop into the left eye every evening.     Review of patient's allergies indicates:   Allergen Reactions    Carvedilol Swelling     Lip swelling    Cephalexin Other (See Comments)     Muscle/joint weakness unable to move     Doxycycline Shortness Of Breath, Nausea And Vomiting and Other (See Comments)     weakness    Erythromycin Other (See Comments)     Complete weakness/could not walk    Gadolinium-containing contrast media Swelling     angioedema    Hydrocodone-acetaminophen Shortness Of Breath     wheezing    Inveltys [loteprednol etabonate] Palpitations and Other (See Comments)     Patient stated bp went up.    Labetalol Shortness Of Breath, Nausea Only, Palpitations and Other (See Comments)     weakness    Loratadine Other (See Comments)     Double vision/spots  Other reaction(s): double vision    Losartan Other (See Comments)     weakness    Naproxen      wheezing  wheezing  wheezing  Other reaction(s): wheezing     Statins-hmg-coa reductase inhibitors Other (See Comments)     Severe Muscle weakness  Muscle weakness  Severe Muscle weakness  Other reaction(s): severe muscle weakness    Amlodipine Other (See Comments)     Myalgias    Fenofibrate Other (See Comments)     muscle weakness      Hydralazine Other (See Comments)     muscle tremors    Hydralazine analogues Other (See Comments)     Muscle tremors/aches      Loteprednol Other (See Comments)     increased BP    Macrolide antibiotics Other (See Comments)     Complete weakness/could not walk      Moxifloxacin Hives and Other (See Comments)     muscle soreness    Timolol Other (See Comments)     Blurred vision, Burning eyes, Severe muscle weakness, eye problems.      Verapamil Diarrhea     Severe diarrhea.      Hydrocortisone     Isosorbide      Tolerates Imdur    Tetanus and diphtheria toxoids     Adhesive Rash     Clonidine patch--contact dermatitis    Codeine Diarrhea and Other (See Comments)     Loss of balance       Doxazosin Palpitations    Fexofenadine Other (See Comments)     Double vision/spots       Hydrocortisone (bulk) Other (See Comments)     Only suppository/ caused muscle weakness    Latanoprost Other (See Comments)     Muscle weakness      Olopatadine Other (See Comments)     Muscle weakness      Prednisone Anxiety     Review of Systems   Constitutional: Negative for malaise/fatigue.   HENT:  Negative for hearing loss.    Eyes:  Positive for visual disturbance. Negative for double vision.   Cardiovascular:  Positive for irregular heartbeat. Negative for chest pain.   Respiratory:  Positive for shortness of breath.    Endocrine: Negative for cold intolerance.   Hematologic/Lymphatic: Does not bruise/bleed easily.   Skin:  Negative for poor wound healing and suspicious lesions.   Musculoskeletal:  Positive for arthritis, back pain, joint pain, joint swelling and myalgias. Negative for gout.   Gastrointestinal:  Negative for nausea and vomiting.   Genitourinary:   Negative for dysuria.   Neurological:  Positive for numbness, paresthesias and sensory change.   Psychiatric/Behavioral:  Negative for depression, memory loss and substance abuse. The patient has insomnia and is nervous/anxious.    Allergic/Immunologic: Negative for persistent infections.       Objective:   Body mass index is 31.24 kg/m².  There were no vitals filed for this visit.             General    Constitutional: She is oriented to person, place, and time. She appears well-developed and well-nourished. No distress.   HENT:   Head: Normocephalic.   Eyes: EOM are normal.   Pulmonary/Chest: Effort normal.   Neurological: She is oriented to person, place, and time.   Psychiatric: She has a normal mood and affect.         Left Hand/Wrist Exam     Inspection   Scars: Wrist - absent Hand -  absent  Effusion: Wrist - absent Hand -  absent    Swelling   Wrist - The patient is swollen on the TFCC.    Other     Sensory Exam  Median Distribution: normal  Ulnar Distribution: normal  Radial Distribution: normal    Comments:  There is a 1 cm ganglion cyst about the distal radioulnar joint dorsally and ulnarly.  This is nontender.          Vascular Exam       Capillary Refill  Left Hand: normal capillary refill        Relevant imaging results reviewed and interpreted by me, discussed with the patient and / or family today radiographs of the left wrist showed calcium pyrophosphate deposition disease with evidence of previous arthrodesis of the thumb metacarpophalangeal joint and degenerative change in multiple small joints  Assessment:     Encounter Diagnoses   Name Primary?    Ganglion of left wrist Yes    Calcium pyrophosphate deposition disease (CPDD) of wrist         Plan:     The patient wishes to avoid injection or surgery.  She was given bilateral wrist splints at her request.  She will return on a as needed basis                Disclaimer: This note was prepared using a voice recognition system and is likely to have  sound alike errors within the text.

## 2023-12-20 NOTE — PATIENT INSTRUCTIONS
If you are feeling unwell, we'd like to be the first ones to know here at [facility]! Please give us a call. Same day appointments are our top priority to keep you well and out of the emergency rooms and hospitals. Call [phone] for our direct line. After hours advice is always available. Please call [phone] after hours to speak to the on-call team.        Increase Lasix to 40 mg twice daily for the next week then reduce to 40 mg each AM and 20 mg q afternoon.     Take metolazone 2.5 mg daily for the next 3 days.     I expect your shortness of breath and swelling to improve over the next few days. If they do not then please let me know right away.

## 2023-12-20 NOTE — PROGRESS NOTES
Shirley Metz  01/22/2024  [id number]    Phylicia Deleon MD  Patient Care Team:  Phylicia Deleon MD as PCP - General (Internal Medicine)  Wilbert Ware MD as Consulting Physician (Ophthalmology)  Rajinder Quintanilla MD as Consulting Physician (Ophthalmology)  Rosario Valadez MD as Consulting Physician (Dermatology)  Amish Mendoza MD (Pulmonary Disease)  Jaquan Choi MD as Consulting Physician (Vascular Surgery)  Emmanuel Fish MD as Consulting Physician (Hand Surgery)  PAXTON Chaidez Jr., MD as Consulting Physician (Orthopedic Surgery)  Phylicia Deleon MD as Physician (Internal Medicine)  Heather Miller NP as Nurse Practitioner (Family Medicine)      [facility] Care Note      Chief Complaint:  Chief Complaint   Patient presents with    Allergic Reaction     Pt has had an allergic reaction to eye drops, post left eye surgery.        History of Present Illness:  HPI    Here for routine follow up. 89 year old female with significant medical history that includes Afib with RVR, glaucoma, pulmonary nodules, HTN, hypertensive heart disease, PAD, HFpEF, hyperparathyroidism.     LOV 12/1/23 - BLE pain, mild RUIZ. Had eye surgery/glaucoma.     Saw Dr Fish today for ganglion cyst. 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 The patient wishes to avoid injection or surgery.  She was given bilateral wrist splints at her request. 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 She is clinically stable. I discussed CV and repeat ablation attempt however she would prefer to remain on her current therapy. RTC 6m     Severe rxn to Lotemax ophth gtts. Generalized weakness and severe generalized muscle pain. Sx still present despite stopping Lotemax. Knees buckling.     Xem gel to left eye.     Cardiologist is leaving. Planning to follow up with Delia.     Home BP typically 112/75 -120/80.   Home HR typically .  "      Fluid retention noted by pt, primarily on trunk, some bilateral ankle swelling. Increased RUIZ much more severe noted by pt. Gradual onset past two weeks. No cough/congestion. Prominent vein left side of neck noted by pt. Has been taking furosemide 40 mg q AM and 20 mg q PM and felt like HF was compensated until surgery. Not taking metolazone.     Intermittent "hard flutter" of heart relieved by clenching fists and other maneuvers.         Review of Systems   Constitutional:  Positive for activity change.   Respiratory:  Positive for shortness of breath. Negative for chest tightness.    Cardiovascular:  Positive for palpitations and leg swelling. Negative for chest pain.   Gastrointestinal:  Negative for constipation and diarrhea.   Neurological:  Negative for dizziness.         The following were reviewed: Active problem list, medication list, allergies, family history, social history, and Health Maintenance.       Medications:  Current Outpatient Medications on File Prior to Visit   Medication Sig Dispense Refill    acetaminophen (TYLENOL) 500 MG tablet Take 500 mg by mouth nightly as needed.      ALPRAZolam (XANAX) 0.5 MG tablet TAKE 1 TABLET (0.5 MG TOTAL) BY MOUTH NIGHTLY AS NEEDED FOR ANXIETY. 30 tablet 4    apixaban (ELIQUIS) 2.5 mg Tab Take 1 tablet (2.5 mg total) by mouth 2 (two) times daily. 180 tablet 3    b complex vitamins capsule Take 1 capsule by mouth once daily.      cholecalciferol, vitamin D3, (VITAMIN D3) 50 mcg (2,000 unit) Cap Take 1 capsule by mouth once daily.      coenzyme Q10 200 mg capsule Take 200 mg by mouth once daily.      fish oil-omega-3 fatty acids 300-1,000 mg capsule Take 1 capsule by mouth once daily.      ketorolac 0.5% (ACULAR) 0.5 % Drop Place 1 drop into the left eye 4 (four) times daily. Start drop the day before surgery. 5 mL 1    metOLazone (ZAROXOLYN) 2.5 MG tablet Take 1 tablet (2.5 mg total) by mouth daily as needed. 30 tablet 2    metoprolol tartrate (LOPRESSOR) " 25 MG tablet Take 1.5 tablets (37.5 mg total) by mouth 2 (two) times daily. 180 tablet 3    brinzolamide (AZOPT) 1 % ophthalmic suspension Place 1 drop into the left eye 2 (two) times a day. 5 mL 3    furosemide (LASIX) 40 MG tablet Take 40 mg by mouth 2 (two) times daily.      gatifloxacin 0.5 % Drop drops Place 1 drop into the left eye every 12 (twelve) hours. Start using the day before surgery 5 mL 1    netarsudiL (RHOPRESSA) 0.02 % ophthalmic solution Place 1 drop into the left eye every evening. 2.5 mL 10    prednisoLONE acetate (PRED FORTE) 1 % DrpS Place 1 drop into the left eye 4 (four) times daily. Start using the day before surgery 5 mL 1    RSVPreF3 antigen-AS01E, PF, (AREXVY, PF,) 120 mcg/0.5 mL SusR vaccine Inject into the muscle. 0.5 mL 0    sars-cov-2, covid-19 pfizer omicron, (PFIZER COVID BIVAL,12Y UP,,PF,) 30 mcg/0.3 mL injection Inject into the muscle. 0.3 mL 0    TRAVATAN Z 0.004 % ophthalmic solution Place 1 drop into the left eye every evening. 2.5 mL 12    [DISCONTINUED] cloNIDine (CATAPRES) 0.1 MG tablet Take 1 tablet (0.1 mg total) by mouth 2 (two) times daily as needed (systolic BP over 180). 20 tablet 1    [DISCONTINUED] isosorbide mononitrate (IMDUR) 30 MG 24 hr tablet Take 1 tablet (30 mg total) by mouth every evening. 90 tablet 1     Current Facility-Administered Medications on File Prior to Visit   Medication Dose Route Frequency Provider Last Rate Last Admin    sodium chloride 0.9% flush 3 mL  3 mL Intravenous PRN Steve Galarza PA-C        vancomycin in dextrose 5 % 1 gram/250 mL IVPB 1,000 mg  1,000 mg Intravenous On Call Procedure Vianney Fountain NP           Medications have been reviewed and reconciled with patient at visit today.    Barriers to medications present (no )    Fall since last office visit (no )      Exam:  Vitals:    12/20/23 1424   BP: 112/68   Pulse: (!) 126   Temp: 98 °F (36.7 °C)     Weight: 83.1 kg (183 lb 3.2 oz)   Body mass index is 31.45  kg/m².      BP Readings from Last 3 Encounters:   01/08/24 120/76   12/20/23 112/68   12/01/23 122/74     Wt Readings from Last 3 Encounters:   01/08/24 0953 82.1 kg (181 lb)   12/20/23 1424 83.1 kg (183 lb 3.2 oz)   12/20/23 0859 82.6 kg (182 lb)            Physical Exam  Constitutional:       General: She is not in acute distress.  HENT:      Mouth/Throat:      Mouth: Mucous membranes are moist.   Eyes:      General: No scleral icterus.  Neck:      Comments: Prominent JVD  Cardiovascular:      Rate and Rhythm: Tachycardia present. Rhythm irregular.      Heart sounds: No murmur heard.  Pulmonary:      Effort: Pulmonary effort is normal.      Breath sounds: Normal breath sounds.   Abdominal:      General: Bowel sounds are normal.      Palpations: Abdomen is soft.   Musculoskeletal:         General: Swelling (1+ pretibial edema) present. No tenderness.      Cervical back: Neck supple. No tenderness.      Right lower leg: Edema present.      Left lower leg: Edema present.   Skin:     General: Skin is warm and dry.      Coloration: Skin is not jaundiced.      Findings: No rash.   Neurological:      General: No focal deficit present.      Mental Status: She is alert. Mental status is at baseline.      Motor: No weakness.   Psychiatric:         Mood and Affect: Mood normal.         Behavior: Behavior normal.         Thought Content: Thought content normal.         Laboratory Reviewed: (Yes)  Lab Results   Component Value Date    WBC 7.00 07/28/2023    HGB 12.2 07/28/2023    HCT 37.4 07/28/2023     07/28/2023    CHOL 170 08/04/2023    TRIG 116 08/04/2023    HDL 40 08/04/2023    ALT 14 07/28/2023    AST 19 07/28/2023     01/08/2024    K 3.8 01/08/2024     01/08/2024    CREATININE 1.8 (H) 01/08/2024    BUN 38 (H) 01/08/2024    CO2 25 01/08/2024    TSH 3.577 04/27/2023    INR 1.1 10/25/2022    HGBA1C 6.3 (H) 09/06/2023           Health Maintenance  Health Maintenance Topics with due status: Not Due        Topic Last Completion Date    Diabetes Urine Screening 03/17/2023    Lipid Panel 08/04/2023    Hemoglobin A1c 09/06/2023     Health Maintenance Due   Topic Date Due    Shingles Vaccine (1 of 2) Never done    Eye Exam  11/06/2021    TETANUS VACCINE  11/09/2021    COVID-19 Vaccine (5 - 2023-24 season) 09/01/2023           Assessment:  Problem List Items Addressed This Visit          Cardiac/Vascular    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 Desires conservative tx at this time and to continue furosemide dosing as is. Does not want to begin metolazone.          Atrial flutter with rapid ventricular response     Discussed mgmt options.   Recommend increase in metoprolol dose if HR > 100 bpm and f/u with cardiology.   Does not want change in dose at this time.           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 Patient expressed understanding. Patient was given the opportunity to ask questions and be an active participant in their medical care. Patient had no further questions or concerns at this time.   -Documentation of patient's health and condition was obtained from family member who was present during visit.  -Patient is an overall moderate risk for health complications from  their medical conditions.       Follow up: Follow up in about 7 weeks (around 2/7/2024).      After visit summary printed and given to patient upon discharge.  Patient goals and care plan are included in After visit summary.    Total medical decision making time was 41 min.  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 Diagnoses of Acute on chronic diastolic congestive heart failure and Atrial flutter with rapid ventricular response were also pertinent to this visit.    Health maintenance needs, recent test results and goals of care discussed with pt and questions answered.

## 2023-12-28 ENCOUNTER — TELEPHONE (OUTPATIENT)
Dept: CARDIOLOGY | Facility: CLINIC | Age: 88
End: 2023-12-28
Payer: MEDICARE

## 2023-12-28 NOTE — TELEPHONE ENCOUNTER
Contacted pt regarding appt. Pt is wanting to get set up w/ JERARDO Banuelos again. Informed her that she does have an appt w/ Dr Knutson on 01/08/24. Pt confirmed that she is okay w/ appt w/ JERARDO Banuelos on 01/25/24. Pt vu w/o q/c    ----- Message from Bonita Armas sent at 12/28/2023  9:55 AM CST -----  Regarding: Soon Appt  Type:  Sooner Apoointment Request    Caller is requesting a sooner appointment.  Caller declined first available appointment listed below.  Caller will not accept being placed on the waitlist and is requesting a message be sent to doctor.  Name of Caller: Shirley Rg   When is the first available appointment? 02/25/2024   Symptoms:  Low blood pressure   Would the patient rather a call back or a response via [facility]? call  Best Call Back Number: [phone] (home)    Additional Information:   Shirley Rg would like to be seen in the next couple of weeks and want to see the PA since Dr. Knutson is leaving she says.

## 2023-12-29 ENCOUNTER — TELEPHONE (OUTPATIENT)
Dept: OPHTHALMOLOGY | Facility: CLINIC | Age: 88
End: 2023-12-29
Payer: MEDICARE

## 2023-12-29 ENCOUNTER — OFFICE VISIT (OUTPATIENT)
Dept: OPHTHALMOLOGY | Facility: CLINIC | Age: 88
End: 2023-12-29
Payer: MEDICARE

## 2023-12-29 DIAGNOSIS — Z98.890 POST-OPERATIVE STATE: Primary | ICD-10-CM

## 2023-12-29 DIAGNOSIS — H40.1131 PRIMARY OPEN ANGLE GLAUCOMA (POAG) OF BOTH EYES, MILD STAGE: ICD-10-CM

## 2023-12-29 PROCEDURE — 1159F MED LIST DOCD IN RCRD: CPT | Mod: HCNC,CPTII,S$GLB, | Performed by: STUDENT IN AN ORGANIZED HEALTH CARE EDUCATION/TRAINING PROGRAM

## 2023-12-29 PROCEDURE — 99999 PR PBB SHADOW E&M-EST. PATIENT-LVL I: CPT | Mod: PBBFAC,HCNC,, | Performed by: STUDENT IN AN ORGANIZED HEALTH CARE EDUCATION/TRAINING PROGRAM

## 2023-12-29 PROCEDURE — 1160F RVW MEDS BY RX/DR IN RCRD: CPT | Mod: HCNC,CPTII,S$GLB, | Performed by: STUDENT IN AN ORGANIZED HEALTH CARE EDUCATION/TRAINING PROGRAM

## 2023-12-29 PROCEDURE — 1159F PR MEDICATION LIST DOCUMENTED IN MEDICAL RECORD: ICD-10-PCS | Mod: HCNC,CPTII,S$GLB, | Performed by: STUDENT IN AN ORGANIZED HEALTH CARE EDUCATION/TRAINING PROGRAM

## 2023-12-29 PROCEDURE — 99999 PR PBB SHADOW E&M-EST. PATIENT-LVL I: ICD-10-PCS | Mod: PBBFAC,HCNC,, | Performed by: STUDENT IN AN ORGANIZED HEALTH CARE EDUCATION/TRAINING PROGRAM

## 2023-12-29 PROCEDURE — 1160F PR REVIEW ALL MEDS BY PRESCRIBER/CLIN PHARMACIST DOCUMENTED: ICD-10-PCS | Mod: HCNC,CPTII,S$GLB, | Performed by: STUDENT IN AN ORGANIZED HEALTH CARE EDUCATION/TRAINING PROGRAM

## 2023-12-29 PROCEDURE — 99215 PR OFFICE/OUTPT VISIT, EST, LEVL V, 40-54 MIN: ICD-10-PCS | Mod: HCNC,S$GLB,, | Performed by: STUDENT IN AN ORGANIZED HEALTH CARE EDUCATION/TRAINING PROGRAM

## 2023-12-29 PROCEDURE — 99215 OFFICE O/P EST HI 40 MIN: CPT | Mod: HCNC,S$GLB,, | Performed by: STUDENT IN AN ORGANIZED HEALTH CARE EDUCATION/TRAINING PROGRAM

## 2023-12-29 RX ORDER — BRIMONIDINE TARTRATE 1.5 MG/ML
1 SOLUTION/ DROPS OPHTHALMIC 3 TIMES DAILY
Qty: 5 ML | Refills: 1 | Status: SHIPPED | OUTPATIENT
Start: 2023-12-29 | End: 2024-01-30

## 2023-12-29 NOTE — PROGRESS NOTES
HPI     Post-op Evaluation     Additional comments: 2 wk post op XEN gel 12/6/23 OS    Pt had sever reaction to lotemax a week ago.   Pt woke up this morning and had a sharp pain above brow that is constant.   Pain this morning 8/10, pain currently 5/10. Pt states she had a HA above   OS yesterday before the pains started.   No visual disturbances since pain started OS.            Comments    Likes ONL 1 pm  Lives at [facility]   Shoulder sx on 8/21/18     1. Mild COAG Goal < 19   SLT OD 3/04 (20 to 15) + 3/11 (19 to 15)   SLT OS 5/05 (20 to 14) +5/11 (18-19 to 15) + 3/12 (min resp.) + 3/11/15   (20.5-17.5) + 3/7/18 (24- 21) + 11/3/21 (22.5-17.5)  (Cosopt, Xalatan, and Timolol cause Myalgias)   (Alphagan causes redness) (zioptan too expensive)   Possible steroid responder   Xen Gel OS 12/06/23  2. PCIOL OU   3. Mild Dry AMD   4. Fuch's Dystrophy   DSAEK OD 4/16 Dr. Quintanilla (followed by Dwight every summer)   DSAEK OS 8/15 Dr. Quintanilla   Rx Inveltys (1% lotemax) (IOP 15/23) 5/29/20   Dr. Quintanilla Ok'd to stop Lotemax OU  5. Dry Eye -intolerance to Restasis   6. Recent A-Fib. (from Labetolol ?)     Lotemax BID OS - Allergic, very weak, pain throughout body      DISCOUNTINUED:  Travatan Z qhs os   Rhopressa qhs OS   Azopt qam OS       TAKING:  OS: Keto QID  Systane PRN OU    +HAG             Last edited by Angel Bullock on 12/29/2023  1:40 PM.            Assessment /Plan     For exam results, see Encounter Report.    Post-operative state- S/P Xen OS,  No bleb left patient is not on Steroids and stopped massages when her brow pain began,   explained to patient the Xen failed due to lack of steroid use and stopping massages     Drops used in clinic-   Rocklatan OS x4  Brimonidine OS x4  Dorzolamide OS x4    No diamox due to kidney disease    *IOP lowered to 34 in clinic     Start medications as listed below:  LEFT EYE  Ketorolac BID OS  Dorzolamide TID OS  Brimonidine TID OS  Vyzulta QHS OS  Rhopressa QHS  Thank you for scheduling your appointment with us today. If you have any questions or concerns related to procedure/medication at today's visit, please contact your primary care provider, or the provider who ordered today's procedure/medication. If you have any questions related to scheduling with our unit, or if you are having trouble getting in contact with your ordering provider, we can be reached at [phone].     OS    Primary open angle glaucoma (POAG) of both eyes, mild stage     Spent 75 minutes with patient     Return to clinic w/ [facility] on Tuesday 1/2/23 for PO

## 2023-12-29 NOTE — TELEPHONE ENCOUNTER
Spoke with patient and , Patient Denies any pain in eye or blurred vision, Per  patient is fine to wait until next tuesday to see , gave patient he oncall # and advised her she can call anytime to reach the on call doctor

## 2023-12-29 NOTE — TELEPHONE ENCOUNTER
----- Message from Skye Nguyen sent at 12/29/2023  7:14 AM CST -----  Contact: [phone]  Pt is calling regarding sever eye issues , pt states she had surgery 12/8/2023 . Please call back today (this morning)at [phone] . Thanksdj

## 2023-12-29 NOTE — TELEPHONE ENCOUNTER
----- Message from Nicolle Fernando sent at 12/29/2023  8:03 AM CST -----  Contact: [phone]    ----- Message -----  From: Skye Nguyen  Sent: 12/29/2023   7:15 AM CST  To: Jeanie MCLEAN Staff    Pt is calling regarding sever eye issues , pt states she had surgery 12/8/2023 . Please call back today (this morning)at [phone] . Thanksdj

## 2024-01-02 ENCOUNTER — OFFICE VISIT (OUTPATIENT)
Dept: OPHTHALMOLOGY | Facility: CLINIC | Age: 89
End: 2024-01-02
Payer: MEDICARE

## 2024-01-02 DIAGNOSIS — Z98.890 POST-OPERATIVE STATE: Primary | ICD-10-CM

## 2024-01-02 PROCEDURE — 99999 PR PBB SHADOW E&M-EST. PATIENT-LVL III: CPT | Mod: PBBFAC,HCNC,, | Performed by: OPHTHALMOLOGY

## 2024-01-02 PROCEDURE — 1160F RVW MEDS BY RX/DR IN RCRD: CPT | Mod: HCNC,CPTII,S$GLB, | Performed by: OPHTHALMOLOGY

## 2024-01-02 PROCEDURE — 99024 POSTOP FOLLOW-UP VISIT: CPT | Mod: HCNC,S$GLB,, | Performed by: OPHTHALMOLOGY

## 2024-01-02 PROCEDURE — 1126F AMNT PAIN NOTED NONE PRSNT: CPT | Mod: HCNC,CPTII,S$GLB, | Performed by: OPHTHALMOLOGY

## 2024-01-02 PROCEDURE — 1159F MED LIST DOCD IN RCRD: CPT | Mod: HCNC,CPTII,S$GLB, | Performed by: OPHTHALMOLOGY

## 2024-01-02 NOTE — PROGRESS NOTES
SUBJECTIVE  Shirley Metz is 89 y.o. female  Corrected distance visual acuity was 20/40 +1 in the right eye and 20/80 in the left eye.   Chief Complaint   Patient presents with    Post-op Evaluation     Post op Xen Gel OS follow up from 12/29/23. VA is stable. Denied of no pain or irritation. Compliant with gtts.          HPI     Post-op Evaluation     Additional comments: Post op Xen Gel OS follow up from 12/29/23. VA is   stable. Denied of no pain or irritation. Compliant with gtts.           Comments      Likes ONL 1 pm  Lives at [facility] sx on 8/21/18     1. Mild COAG Goal < 19   SLT OD 3/04 (20 to 15) + 3/11 (19 to 15)   SLT OS 5/05 (20 to 14) +5/11 (18-19 to 15) + 3/12 (min resp.) + 3/11/15   (20.5-17.5) + 3/7/18 (24- 21) + 11/3/21 (22.5-17.5)  (Cosopt, Xalatan, and Timolol cause Myalgias)   (Alphagan causes redness) (zioptan too expensive)   Possible steroid responder   Xen Gel OS 12/06/23  2. PCIOL OU   3. Mild Dry AMD   4. Fuch's Dystrophy   DSAEK OD 4/16 Dr. Quintanilla (followed by Dwight every summer)   DSAEK OS 8/15 Dr. Quintanilla   Rx Inveltys (1% lotemax) (IOP 15/23) 5/29/20   Dr. Quintanilla Ok'd to stop Lotemax OU  5. Dry Eye -intolerance to Restasis   6. Recent A-Fib. (from Labetolol ?)     >>>>Lotemax BID OS - Allergic, very weak, pain throughout body    Systane Ultra 4-5 tiimes daily   Travatan Z qhs os Discontinue  Rhopressa qhs OS Discontinue  Azopt qam OS Discontinue    OS: Keto QID  OS: Dorzolamide (Azopt) TID  OS: Brimonidine TID  OS: Vyzulya QHS  OS: Rhopressa QHS    +HAG          Last edited by Jairo Esposito on 1/2/2024  8:23 AM.         Assessment /Plan :  1. Post-operative state s/p Xen gel OS - IOP is better today, monitor for now  Continue:  Ketorolac one drop in the left eye 4 times a day  Azopt or Dorzolamide one drop in the left eye 3 times a day  Brimonidine one drop in the left eye 3 times a day  Vyzulta one drop in the left eye every night  Rhopressa one drop in  the left eye every night  Continue massaging the left eye every 2 hours as directed   RTC in 1 week or prn for any changes

## 2024-01-08 ENCOUNTER — OFFICE VISIT (OUTPATIENT)
Dept: CARDIOLOGY | Facility: CLINIC | Age: 89
End: 2024-01-08
Payer: MEDICARE

## 2024-01-08 ENCOUNTER — LAB VISIT (OUTPATIENT)
Dept: LAB | Facility: HOSPITAL | Age: 89
End: 2024-01-08
Attending: NURSE PRACTITIONER
Payer: MEDICARE

## 2024-01-08 ENCOUNTER — OFFICE VISIT (OUTPATIENT)
Dept: OPHTHALMOLOGY | Facility: CLINIC | Age: 89
End: 2024-01-08
Payer: MEDICARE

## 2024-01-08 ENCOUNTER — TELEPHONE (OUTPATIENT)
Dept: OPHTHALMOLOGY | Facility: CLINIC | Age: 89
End: 2024-01-08

## 2024-01-08 VITALS
DIASTOLIC BLOOD PRESSURE: 76 MMHG | OXYGEN SATURATION: 94 % | BODY MASS INDEX: 30.9 KG/M2 | WEIGHT: 181 LBS | HEART RATE: 122 BPM | HEIGHT: 64 IN | SYSTOLIC BLOOD PRESSURE: 120 MMHG

## 2024-01-08 DIAGNOSIS — N19 HYPERTENSIVE HEART AND RENAL DISEASE WITH BOTH (CONGESTIVE) HEART FAILURE AND RENAL FAILURE: ICD-10-CM

## 2024-01-08 DIAGNOSIS — I48.0 PAF (PAROXYSMAL ATRIAL FIBRILLATION): ICD-10-CM

## 2024-01-08 DIAGNOSIS — Z98.890 POST-OPERATIVE STATE: Primary | ICD-10-CM

## 2024-01-08 DIAGNOSIS — I10 PRIMARY HYPERTENSION: ICD-10-CM

## 2024-01-08 DIAGNOSIS — R94.31 ABNORMAL ECG: ICD-10-CM

## 2024-01-08 DIAGNOSIS — I51.3 THROMBUS OF LEFT ATRIAL APPENDAGE: ICD-10-CM

## 2024-01-08 DIAGNOSIS — N18.32 STAGE 3B CHRONIC KIDNEY DISEASE: ICD-10-CM

## 2024-01-08 DIAGNOSIS — I13.2 HYPERTENSIVE HEART AND RENAL DISEASE WITH BOTH (CONGESTIVE) HEART FAILURE AND RENAL FAILURE: ICD-10-CM

## 2024-01-08 DIAGNOSIS — I50.33 ACUTE ON CHRONIC DIASTOLIC CONGESTIVE HEART FAILURE: ICD-10-CM

## 2024-01-08 DIAGNOSIS — I11.9 HYPERTENSIVE LEFT VENTRICULAR HYPERTROPHY, WITHOUT HEART FAILURE: Primary | ICD-10-CM

## 2024-01-08 LAB
ALBUMIN SERPL BCP-MCNC: 3.6 G/DL (ref 3.5–5.2)
ANION GAP SERPL CALC-SCNC: 12 MMOL/L (ref 8–16)
BUN SERPL-MCNC: 38 MG/DL (ref 10–30)
CALCIUM SERPL-MCNC: 9.4 MG/DL (ref 8.7–10.5)
CHLORIDE SERPL-SCNC: 106 MMOL/L (ref 95–110)
CO2 SERPL-SCNC: 25 MMOL/L (ref 23–29)
CREAT SERPL-MCNC: 1.8 MG/DL (ref 0.5–1.4)
EST. GFR  (NO RACE VARIABLE): 26.3 ML/MIN/1.73 M^2
GLUCOSE SERPL-MCNC: 150 MG/DL (ref 70–110)
PHOSPHATE SERPL-MCNC: 4 MG/DL (ref 2.7–4.5)
POTASSIUM SERPL-SCNC: 3.8 MMOL/L (ref 3.5–5.1)
SODIUM SERPL-SCNC: 143 MMOL/L (ref 136–145)

## 2024-01-08 PROCEDURE — 1160F RVW MEDS BY RX/DR IN RCRD: CPT | Mod: HCNC,CPTII,S$GLB, | Performed by: OPHTHALMOLOGY

## 2024-01-08 PROCEDURE — 99999 PR PBB SHADOW E&M-EST. PATIENT-LVL IV: CPT | Mod: PBBFAC,HCNC,, | Performed by: INTERNAL MEDICINE

## 2024-01-08 PROCEDURE — 1159F MED LIST DOCD IN RCRD: CPT | Mod: HCNC,CPTII,S$GLB, | Performed by: INTERNAL MEDICINE

## 2024-01-08 PROCEDURE — 1101F PT FALLS ASSESS-DOCD LE1/YR: CPT | Mod: HCNC,CPTII,S$GLB, | Performed by: INTERNAL MEDICINE

## 2024-01-08 PROCEDURE — 80069 RENAL FUNCTION PANEL: CPT | Mod: HCNC | Performed by: NURSE PRACTITIONER

## 2024-01-08 PROCEDURE — 1126F AMNT PAIN NOTED NONE PRSNT: CPT | Mod: HCNC,CPTII,S$GLB, | Performed by: INTERNAL MEDICINE

## 2024-01-08 PROCEDURE — 3288F FALL RISK ASSESSMENT DOCD: CPT | Mod: HCNC,CPTII,S$GLB, | Performed by: INTERNAL MEDICINE

## 2024-01-08 PROCEDURE — 36415 COLL VENOUS BLD VENIPUNCTURE: CPT | Mod: HCNC | Performed by: NURSE PRACTITIONER

## 2024-01-08 PROCEDURE — 1159F MED LIST DOCD IN RCRD: CPT | Mod: HCNC,CPTII,S$GLB, | Performed by: OPHTHALMOLOGY

## 2024-01-08 PROCEDURE — 1160F RVW MEDS BY RX/DR IN RCRD: CPT | Mod: HCNC,CPTII,S$GLB, | Performed by: INTERNAL MEDICINE

## 2024-01-08 PROCEDURE — 99214 OFFICE O/P EST MOD 30 MIN: CPT | Mod: HCNC,S$GLB,, | Performed by: INTERNAL MEDICINE

## 2024-01-08 PROCEDURE — 99024 POSTOP FOLLOW-UP VISIT: CPT | Mod: HCNC,S$GLB,, | Performed by: OPHTHALMOLOGY

## 2024-01-08 PROCEDURE — 99999 PR PBB SHADOW E&M-EST. PATIENT-LVL III: CPT | Mod: PBBFAC,HCNC,, | Performed by: OPHTHALMOLOGY
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
## 2024-01-08 NOTE — TELEPHONE ENCOUNTER
Called Dr. Eric Gomez's office and scheduled pt with glaucoma/Ahmed Valve eval with Dr. Gomez for 1/9 at 12:30. Sent paper work to his fax at [phone].  
DISCHARGE

## 2024-01-08 NOTE — PROGRESS NOTES
SUBJECTIVE  AdrianaIfrah Metz is 89 y.o. female  Corrected distance visual acuity was 20/40 -2 in the right eye and 20/60 in the left eye.   Chief Complaint   Patient presents with    Post-op Evaluation     S/p xen gel OS 12/6/23. Denies pain or irritation. Va stable. Using gtts as recommended.          HPI     Post-op Evaluation     Additional comments: S/p xen gel OS 12/6/23. Denies pain or irritation.   Va stable. Using gtts as recommended.           Comments    1. Mild COAG Goal < 19   SLT OD 3/04 (20 to 15) + 3/11 (19 to 15)   SLT OS 5/05 (20 to 14) +5/11 (18-19 to 15) + 3/12 (min resp.) + 3/11/15   (20.5-17.5) + 3/7/18 (24- 21) + 11/3/21 (22.5-17.5)  (Cosopt, Xalatan, and Timolol cause Myalgias)   (Alphagan causes redness) (zioptan too expensive)   Possible steroid responder   Xen Gel OS 12/06/23  2. PCIOL OU   3. Mild Dry AMD   4. Fuch's Dystrophy   DSAEK OD 4/16 Dr. Quintanilla (followed by Dwight every summer)   DSAEK OS 8/15 Dr. Quintanilla   Rx Inveltys (1% lotemax) (IOP 15/23) 5/29/20   Dr. Quintanilla Ok'd to stop Lotemax OU  5. Dry Eye -intolerance to Restasis   6. Recent A-Fib. (from Labetolol ?)     >>>>Lotemax BID OS - Allergic, very weak, pain throughout body    Systane Ultra 4-5 tiimes daily   Travatan Z qhs os Discontinue  Rhopressa qhs OS Discontinue  Azopt qam OS Discontinue    OS: Keto QID  OS: Dorzolamide (Azopt) TID  OS: Brimonidine TID  OS: Vyzulya QHS  OS: Rhopressa QHS    +HAG             Last edited by Fred Otto on 1/8/2024  8:31 AM.         Assessment /Plan :  1. Post-operative state s/p xen gel OS, IOP elevated today with increased scarring around stent    Due to severe reaction to steroid, 2nd xen gel not viable option. Recommend evaluation with Eric Gomez.    Continue:  OS: Keto QID  OS: Dorzolamide (Azopt) TID  OS: Brimonidine TID  OS: Vyzulya QHS  OS: Rhopressa QHS     Return to clinic as needed

## 2024-01-09 ENCOUNTER — TELEPHONE (OUTPATIENT)
Dept: OPHTHALMOLOGY | Facility: CLINIC | Age: 89
End: 2024-01-09
Payer: MEDICARE

## 2024-01-09 NOTE — TELEPHONE ENCOUNTER
Sent to fax below    ----- Message from Dennise Angel sent at 1/9/2024  1:00 PM CST -----  Name of Who is Calling: Dr Gomez eye specialist office        What is the request in detail:Called in regards to getting pt last office visit notes faxed over to [phone].Please advise thank you       Can the clinic reply by [facility]:no        What Number to Call Back if not in [facility]:

## 2024-01-10 ENCOUNTER — TELEPHONE (OUTPATIENT)
Dept: OPHTHALMOLOGY | Facility: CLINIC | Age: 89
End: 2024-01-10
Payer: MEDICARE

## 2024-01-10 NOTE — TELEPHONE ENCOUNTER
"Pt called to let us know that Dr. Gomez recommended she have a "laser treatment and a valve" done for her eyes. Pt was unsure about these procedures and if Dr. Gomez was aware of her issues with antibiotics. Told pt that she should call Dr. Gomez's office and make sure he is aware of her allergies to antibiotics and if there are any worries about using Moxifloxacin. Pt will call on Friday to let us know if there is anything more we need to do.     ----- Message from Lila Eli sent at 1/10/2024  1:17 PM CST -----  Regarding: pt advice  Contact: [phone]  Pt calling in regards to speak to provider, or Benjamin, regarding this matter. Pls call     "

## 2024-01-12 ENCOUNTER — TELEPHONE (OUTPATIENT)
Dept: OPHTHALMOLOGY | Facility: CLINIC | Age: 89
End: 2024-01-12
Payer: MEDICARE

## 2024-01-12 NOTE — TELEPHONE ENCOUNTER
Attempted to call patient, but pt did not answer and did not have an available vm.    ----- Message from [place] Jason sent at 1/12/2024  9:30 AM CST -----  Contact: wendy  Stefanwallace please contact Ms Beltrán she says she was already speaking with you about her procedure with Dr Gomez and the meds he's prescribing her.  [place]   ----- Message -----  From: Reilly Craig  Sent: 1/12/2024   7:58 AM CST  To: Jeanie MCLEAN Staff    Patient is requesting a call to discuss upcoming procedure. Please call her back at [phone].          Thanks  DD

## 2024-01-16 ENCOUNTER — OFFICE VISIT (OUTPATIENT)
Dept: OPHTHALMOLOGY | Facility: CLINIC | Age: 89
End: 2024-01-16
Payer: MEDICARE

## 2024-01-16 ENCOUNTER — TELEPHONE (OUTPATIENT)
Dept: OPHTHALMOLOGY | Facility: CLINIC | Age: 89
End: 2024-01-16
Payer: MEDICARE

## 2024-01-16 DIAGNOSIS — Z98.890 POST-OPERATIVE STATE: Primary | ICD-10-CM

## 2024-01-16 PROCEDURE — 99999 PR PBB SHADOW E&M-EST. PATIENT-LVL III: CPT | Mod: PBBFAC,HCNC,, | Performed by: OPHTHALMOLOGY

## 2024-01-16 PROCEDURE — 99024 POSTOP FOLLOW-UP VISIT: CPT | Mod: HCNC,S$GLB,, | Performed by: OPHTHALMOLOGY

## 2024-01-16 PROCEDURE — 1160F RVW MEDS BY RX/DR IN RCRD: CPT | Mod: HCNC,CPTII,S$GLB, | Performed by: OPHTHALMOLOGY

## 2024-01-16 PROCEDURE — 1126F AMNT PAIN NOTED NONE PRSNT: CPT | Mod: HCNC,CPTII,S$GLB, | Performed by: OPHTHALMOLOGY

## 2024-01-16 PROCEDURE — 1159F MED LIST DOCD IN RCRD: CPT | Mod: HCNC,CPTII,S$GLB, | Performed by: OPHTHALMOLOGY

## 2024-01-16 NOTE — TELEPHONE ENCOUNTER
Patient called wanting clarification on a medication that she was prescribed. Cleared up that she is not taking a steroid. Pt still wants to come in and have Dr. Ware check her eye pressure and discuss what Dr. Gomez has planned for her. Scheduled her for an IOP check this afternoon.    ----- Message from Albino Cooper sent at 1/16/2024  8:05 AM CST -----  Contact: Shirley  Pt was calling in regards to getting a call back to discuss possibly coming in on 01/16 or 01/17 to get the pressure check.  Please call back at .[phone]. Pt states Dr. Gomez is still prescribing medication that she is allergic to.      Thanks

## 2024-01-16 NOTE — PROGRESS NOTES
SUBJECTIVE  Shirley Metz is 89 y.o. female  Corrected distance visual acuity was 20/30 -1 in the right eye and 20/50 -2 in the left eye.   Chief Complaint   Patient presents with    Post-op Evaluation     Post op xen gel OS. Seen Dr. Gomez and he prescribed medication Vagamox. Which she was allergic too and he gave her another one called tobramycin. He was gonna do a procedure but she has some question about it. Wasn't to fond of it. VA is okay but sometimes blurry when using the drops. Slight have some pain. Compliant with gtts.          HPI     Post-op Evaluation     Additional comments: Post op xen gel OS. Seen Dr. Gomez and he   prescribed medication Vagamox. Which she was allergic too and he gave her   another one called tobramycin. He was gonna do a procedure but she has   some question about it. Wasn't to fond of it. VA is okay but sometimes   blurry when using the drops. Slight have some pain. Compliant with gtts.           Comments    Likes ONL 1 pm  Lives at [facility] sx on 8/21/18     1. Mild COAG Goal < 19   SLT OD 3/04 (20 to 15) + 3/11 (19 to 15)   SLT OS 5/05 (20 to 14) +5/11 (18-19 to 15) + 3/12 (min resp.) + 3/11/15   (20.5-17.5) + 3/7/18 (24- 21) + 11/3/21 (22.5-17.5)  (Cosopt, Xalatan, and Timolol cause Myalgias)   (Alphagan causes redness) (zioptan too expensive)   Possible steroid responder   Xen Gel OS 12/06/23  2. PCIOL OU   3. Mild Dry AMD   4. Fuch's Dystrophy   DSAEK OD 4/16 Dr. Quintanilla (followed by [facility] every summer)   DSAEK OS 8/15 Dr. Quintanilla   Rx Inveltys (1% lotemax) (IOP 15/23) 5/29/20   Dr. Quintanilla Ok'd to stop Lotemax OU  5. Dry Eye -intolerance to Restasis   6. Recent A-Fib. (from Labetolol ?)     >>>>Lotemax BID OS - Allergic, very weak, pain throughout body    Systane Ultra 4-5 tiimes daily   Travatan Z qhs os Discontinue  Rhopressa qhs OS Discontinue  Azopt qam OS Discontinue    OS: Keto QID  OS: Dorzolamide (Azopt) TID  OS: Brimonidine  TID  OS: Vyzulya QHS  OS: Rhopressa QHS    +HAG             Last edited by Jairo Esposito on 1/16/2024  1:14 PM.         Assessment /Plan :  1. Post-operative state - S/p Xen gel OS    IOP steadily increasing, 36 today, 31 last week. Patient has scheduled a G6 and Ahmed valve OS with Dr. Gomez for either 1/23 or 1/30. I had a long discussion with patient about medication allergies and the current recommendations from Dr. Gomez and feel that after exhaustively using every modality that I agree with Dr. Gomez's plan.    Return to clinic as needed

## 2024-01-17 ENCOUNTER — TELEPHONE (OUTPATIENT)
Dept: OPHTHALMOLOGY | Facility: CLINIC | Age: 89
End: 2024-01-17
Payer: MEDICARE

## 2024-01-17 NOTE — TELEPHONE ENCOUNTER
I called and spoke with the patient. The patient states that she is scheduled for surgery with Dr. Gomez on 2/13/24. The patient wants to have her eye pressure rechecked before her surgery. I will discuss with Dr. Ware tomorrow and call the patient back with his response.    ----- Message from Erin Simms sent at 1/17/2024  9:25 AM CST -----  Contact: patient  [phone]  Patient called requesting a call back from Dr. Ware or the nurse, regarding her eye operation, patient can't had it done until 02/13/24 and can she come in and get another pressure check before that time?

## 2024-01-18 ENCOUNTER — TELEPHONE (OUTPATIENT)
Dept: OPHTHALMOLOGY | Facility: CLINIC | Age: 89
End: 2024-01-18
Payer: MEDICARE

## 2024-01-18 NOTE — TELEPHONE ENCOUNTER
I called and spoke with the patient. Per Dr. Ware, there are no other eyedrops or intervention for the intraocular pressure that he can do at this time. Dr. Ware referred the patient to Dr. Gomez for surgery. Per Dr. Ware, the patient can call Dr. Gomez's office to see if he wants to recheck her intraocular pressure before her surgery scheduled on 2/13/24.

## 2024-01-22 NOTE — ASSESSMENT & PLAN NOTE
Discussed mgmt options.   Recommend increase in metoprolol dose if HR > 100 bpm and f/u with cardiology.   Does not want change in dose at this time.

## 2024-01-22 NOTE — ASSESSMENT & PLAN NOTE
Wt Readings from Last 3 Encounters:   01/08/24 0953 82.1 kg (181 lb)   12/20/23 1424 83.1 kg (183 lb 3.2 oz)   12/20/23 0859 82.6 kg (182 lb)     Discussed mgmt options. Desires conservative tx at this time and to continue furosemide dosing as is. Does not want to begin metolazone.

## 2024-01-26 ENCOUNTER — HOSPITAL ENCOUNTER (OUTPATIENT)
Dept: CARDIOLOGY | Facility: HOSPITAL | Age: 89
Discharge: HOME | End: 2024-01-26
Payer: MEDICARE

## 2024-01-26 ENCOUNTER — OFFICE VISIT (OUTPATIENT)
Dept: CARDIOLOGY | Facility: CLINIC | Age: 89
End: 2024-01-26
Payer: MEDICARE

## 2024-01-26 VITALS
WEIGHT: 178.81 LBS | HEIGHT: 64 IN | OXYGEN SATURATION: 96 % | BODY MASS INDEX: 30.53 KG/M2 | HEART RATE: 132 BPM | SYSTOLIC BLOOD PRESSURE: 124 MMHG | DIASTOLIC BLOOD PRESSURE: 72 MMHG

## 2024-01-26 DIAGNOSIS — I50.32 CHRONIC DIASTOLIC CONGESTIVE HEART FAILURE: ICD-10-CM

## 2024-01-26 DIAGNOSIS — I10 PRIMARY HYPERTENSION: Chronic | ICD-10-CM

## 2024-01-26 DIAGNOSIS — Z01.810 PRE-OPERATIVE CARDIOVASCULAR EXAMINATION: ICD-10-CM

## 2024-01-26 DIAGNOSIS — R79.89 ELEVATED TROPONIN: ICD-10-CM

## 2024-01-26 DIAGNOSIS — I48.19 PERSISTENT ATRIAL FIBRILLATION: ICD-10-CM

## 2024-01-26 DIAGNOSIS — I11.9 HYPERTENSIVE LEFT VENTRICULAR HYPERTROPHY, WITHOUT HEART FAILURE: Chronic | ICD-10-CM

## 2024-01-26 DIAGNOSIS — Z01.818 PRE-OP EVALUATION: ICD-10-CM

## 2024-01-26 DIAGNOSIS — Z01.810 PRE-OPERATIVE CARDIOVASCULAR EXAMINATION: Primary | ICD-10-CM

## 2024-01-26 DIAGNOSIS — E78.2 MIXED HYPERLIPIDEMIA: ICD-10-CM

## 2024-01-26 DIAGNOSIS — I48.92 ATRIAL FLUTTER WITH RAPID VENTRICULAR RESPONSE: Primary | ICD-10-CM

## 2024-01-26 DIAGNOSIS — I50.33 ACUTE ON CHRONIC DIASTOLIC CONGESTIVE HEART FAILURE: ICD-10-CM

## 2024-01-26 DIAGNOSIS — E66.9 OBESITY (BMI 30.0-34.9): ICD-10-CM

## 2024-01-26 DIAGNOSIS — Z79.01 CHRONIC ANTICOAGULATION: ICD-10-CM

## 2024-01-26 DIAGNOSIS — I70.0 CALCIFICATION OF AORTA: Chronic | ICD-10-CM

## 2024-01-26 PROCEDURE — 3288F FALL RISK ASSESSMENT DOCD: CPT | Mod: HCNC,CPTII,S$GLB, | Performed by: PHYSICIAN ASSISTANT

## 2024-01-26 PROCEDURE — 1101F PT FALLS ASSESS-DOCD LE1/YR: CPT | Mod: HCNC,CPTII,S$GLB, | Performed by: PHYSICIAN ASSISTANT

## 2024-01-26 PROCEDURE — 99214 OFFICE O/P EST MOD 30 MIN: CPT | Mod: HCNC,S$GLB,, | Performed by: PHYSICIAN ASSISTANT

## 2024-01-26 PROCEDURE — 1125F AMNT PAIN NOTED PAIN PRSNT: CPT | Mod: HCNC,CPTII,S$GLB, | Performed by: PHYSICIAN ASSISTANT

## 2024-01-26 PROCEDURE — 1159F MED LIST DOCD IN RCRD: CPT | Mod: HCNC,CPTII,S$GLB, | Performed by: PHYSICIAN ASSISTANT

## 2024-01-26 PROCEDURE — 99999 PR PBB SHADOW E&M-EST. PATIENT-LVL IV: CPT | Mod: PBBFAC,HCNC,, | Performed by: PHYSICIAN ASSISTANT

## 2024-01-26 PROCEDURE — 93010 ELECTROCARDIOGRAM REPORT: CPT | Mod: HCNC,,, | Performed by: INTERNAL MEDICINE

## 2024-01-26 PROCEDURE — 93005 ELECTROCARDIOGRAM TRACING: CPT | Mod: HCNC

## 2024-01-26 RX ORDER — LATANOPROSTENE BUNOD 0.24 MG/ML
1 SOLUTION/ DROPS OPHTHALMIC NIGHTLY
COMMUNITY

## 2024-01-26 NOTE — PROGRESS NOTES
Subjective:   Patient ID:  Shirley Metz is a 89 y.o. female who presents for follow-up of atrial flutter/CHF/pre-op evaluation      HPI  Ms. Sg Metz is a 89 year old female patient whose current medical conditions include afib/aflutter s/p prior PVI (on Eliquis) and failed STACEY/DCCV 3/23 (did not maintain SR), glaucoma, chronic diastolic CHF, PAD, carotid artery disease, and CKD who presents today for follow-up and pre-op evaluation. Needs semi-urgent eye surgery with Dr. Gomez on 2/15/24 (has glaucoma, may have permanent vision loss). CV wise, seems to be doing ok. Endorses chronic RUIZ/SOB. No chest pain, heaviness, or tightness. No palpitations. No LH, dizziness, near syncope, or syncope. Chronic BLE edema. BP stable/controlled. HR elevated, patient asymptomatic. She feels HR high due to recent allergic reaction/med change. She is allergic/intolerant to several rate-control agents as well as anti-arrhythmics. I would not feel comfortable giving digoxin given her advanced age and CKD. She is compliant with her medications, took Lopressor this AM, states she cannot tolerate an increase in dosage.    EKG today shows atrial flutter 2:1 conduction, , marked ST abnormality. EF by STACEY in 3/23 normal.      Review of Systems   Constitutional: Positive for malaise/fatigue. Negative for chills, decreased appetite and fever.   HENT:  Negative for congestion, hoarse voice and sore throat.    Eyes:  Negative for blurred vision and discharge.   Cardiovascular:  Positive for leg swelling. Negative for chest pain, claudication, cyanosis, dyspnea on exertion, irregular heartbeat, near-syncope, orthopnea, palpitations and paroxysmal nocturnal dyspnea.   Respiratory:  Negative for cough, hemoptysis, shortness of breath, snoring, sputum production and wheezing.    Endocrine: Negative for cold intolerance and heat intolerance.   Hematologic/Lymphatic: Negative for bleeding problem. Bruises/bleeds easily.  "  Skin:  Negative for rash.   Musculoskeletal:  Positive for arthritis and joint pain. Negative for back pain, joint swelling, muscle cramps, muscle weakness and myalgias.   Gastrointestinal:  Negative for abdominal pain, constipation, diarrhea, heartburn, melena and nausea.   Genitourinary:  Negative for hematuria.   Neurological:  Positive for weakness. Negative for dizziness, focal weakness, headaches, light-headedness, loss of balance, numbness, paresthesias and seizures.   Psychiatric/Behavioral:  Negative for memory loss. The patient does not have insomnia.    Allergic/Immunologic: Negative for hives.     /72 (BP Location: Right arm, Patient Position: Sitting)   Pulse (!) 132   Ht 5' 4" (1.626 m)   Wt 81.1 kg (178 lb 12.7 oz)   LMP  (LMP Unknown)   SpO2 96%   BMI 30.69 kg/m²     Objective:   Physical Exam  Vitals and nursing note reviewed.   Constitutional:       General: She is not in acute distress.     Appearance: She is well-developed. She is not diaphoretic.      Comments: Uses walker     HENT:      Head: Normocephalic and atraumatic.   Eyes:      General:         Right eye: No discharge.         Left eye: No discharge.      Pupils: Pupils are equal, round, and reactive to light.   Cardiovascular:      Rate and Rhythm: Tachycardia present. Rhythm irregularly irregular.      Heart sounds: Normal heart sounds, S1 normal and S2 normal. No murmur heard.  Pulmonary:      Effort: Pulmonary effort is normal. No respiratory distress.      Breath sounds: Normal breath sounds. No wheezing or rales.   Abdominal:      General: There is no distension.   Musculoskeletal:      Right lower leg: Edema present.      Left lower leg: Edema present.   Skin:     General: Skin is warm and dry.      Findings: No erythema.   Neurological:      General: No focal deficit present.      Mental Status: She is alert and oriented to person, place, and time.   Psychiatric:         Mood and Affect: Mood normal.         " Behavior: Behavior normal.         Thought Content: Thought content normal.       STACEY Results 3/23  Summary    The left ventricle is normal in size with normal systolic function.  Atrial fibrillation observed.  Normal right ventricular size with normal right ventricular systolic function.  There is a patent foramen ovale present.  Normal appearing left atrial appendage. No thrombus is present in the appendage. Abnormal appendage velocities.  Right atrial enlargement.  Moderate mitral regurgitation.  Mild tricuspid regurgitation.  Plaque present in the descending aorta.  The estimated ejection fraction is 55%.  A 200 J synchronized cardioversion was successfully performed with restoration of normal sinus rhythm.     Assessment:      1. Atrial flutter with rapid ventricular response    2. Calcification of aorta    3. Chronic diastolic congestive heart failure    4. Elevated troponin    5. Primary hypertension    6. Mixed hyperlipidemia    7. Chronic anticoagulation    8. Obesity (BMI 30.0-34.9)    9. Pre-op evaluation    10. Acute on chronic diastolic congestive heart failure      Patient presents for f/u. Doing well overall. No change in CV symptoms. She is in rapid aflutter today. Recommended ED for IV treatment/workup, she declined. She is allergic and intolerant to several medications/anti-arrhythmics which complicates management. Followed by EP, declined repeat ablation. Will try to increase Lopressor to 50 mg BID and assess response. She has f/u with PCP next week, will see what HR is at that visit. No clearance at present time.   Plan:   -Increase Lopressor to 50 mg BID  -Continue other CV meds/mgmt  -Phone review next week  -Patient advised to monitor her BP at home, call clinic if any issues  -No clearance at present time  -ED if any concerning symptoms  -Follow-up TBA

## 2024-01-29 ENCOUNTER — TELEPHONE (OUTPATIENT)
Dept: OPHTHALMOLOGY | Facility: CLINIC | Age: 89
End: 2024-01-29
Payer: MEDICARE

## 2024-01-29 ENCOUNTER — OFFICE VISIT (OUTPATIENT)
Dept: PRIMARY CARE CLINIC | Facility: CLINIC | Age: 89
End: 2024-01-29
Payer: MEDICARE

## 2024-01-29 ENCOUNTER — TELEPHONE (OUTPATIENT)
Dept: PRIMARY CARE CLINIC | Facility: CLINIC | Age: 89
End: 2024-01-29
Payer: MEDICARE

## 2024-01-29 VITALS
OXYGEN SATURATION: 96 % | SYSTOLIC BLOOD PRESSURE: 108 MMHG | BODY MASS INDEX: 30.78 KG/M2 | WEIGHT: 180.31 LBS | HEART RATE: 111 BPM | TEMPERATURE: 98 F | DIASTOLIC BLOOD PRESSURE: 56 MMHG | HEIGHT: 64 IN

## 2024-01-29 DIAGNOSIS — I48.0 PAF (PAROXYSMAL ATRIAL FIBRILLATION): Chronic | ICD-10-CM

## 2024-01-29 DIAGNOSIS — I48.91 ATRIAL FIBRILLATION WITH RVR: Primary | ICD-10-CM

## 2024-01-29 DIAGNOSIS — H40.1131 CHRONIC OPEN ANGLE GLAUCOMA OF BOTH EYES, MILD STAGE: ICD-10-CM

## 2024-01-29 DIAGNOSIS — I50.33 ACUTE ON CHRONIC DIASTOLIC CONGESTIVE HEART FAILURE: ICD-10-CM

## 2024-01-29 PROCEDURE — 3288F FALL RISK ASSESSMENT DOCD: CPT | Mod: HCNC,CPTII,S$GLB, | Performed by: NURSE PRACTITIONER

## 2024-01-29 PROCEDURE — 99215 OFFICE O/P EST HI 40 MIN: CPT | Mod: HCNC,S$GLB,, | Performed by: NURSE PRACTITIONER

## 2024-01-29 PROCEDURE — 1101F PT FALLS ASSESS-DOCD LE1/YR: CPT | Mod: HCNC,CPTII,S$GLB, | Performed by: NURSE PRACTITIONER

## 2024-01-29 PROCEDURE — 99999 PR PBB SHADOW E&M-EST. PATIENT-LVL V: CPT | Mod: PBBFAC,HCNC,, | Performed by: NURSE PRACTITIONER

## 2024-01-29 RX ORDER — METOPROLOL TARTRATE 25 MG/1
75 TABLET, FILM COATED ORAL 2 TIMES DAILY
Qty: 180 TABLET | Refills: 3 | Status: SHIPPED | OUTPATIENT
Start: 2024-01-29 | End: 2024-03-22

## 2024-01-29 NOTE — TELEPHONE ENCOUNTER
Returned patients call, she states she can not get in to see Dr Gomez  this week and he is out of the office next week she states VA is blurry, and she thinks her IOP is elevated and is stressing to see [facility], also she states that her pule is very high and that she does not know if she will be cleared by her PCP to even proceed with surgery and wants to be seen ASAP by [facility].

## 2024-01-29 NOTE — ASSESSMENT & PLAN NOTE
Message sent to Dr Ware's staff to schedule appt for blurred vision.   Pt will contact office for the same.

## 2024-01-29 NOTE — PROGRESS NOTES
Shirley Metz  01/30/2024  [id number]    Phylicia Deleon MD  Patient Care Team:  Phylicia Deleon MD as PCP - General (Internal Medicine)  Wilbert Ware MD as Consulting Physician (Ophthalmology)  Rajinder Quintanilla MD as Consulting Physician (Ophthalmology)  Rosario Valadez MD as Consulting Physician (Dermatology)  Amish Mendoza MD (Pulmonary Disease)  Jaquan Choi MD as Consulting Physician (Vascular Surgery)  Emmanuel Fish MD as Consulting Physician (Hand Surgery)  PAXTON Chaidez Jr., MD as Consulting Physician (Orthopedic Surgery)  Phylicia Deleon MD as Physician (Internal Medicine)  Heather Miller NP as Nurse Practitioner (Family Medicine)      [facility] Care Note      Chief Complaint:  Chief Complaint   Patient presents with    Follow-up     Pt has had a elevated heart rate.        History of Present Illness:  HPI    Rapid HR. She would like to have eye surgery on 2/13/24 but needs HR lower prio to surgery.   Eye stent 12/6/23. Rx Lotemax but 4 days later she had myopathy and myalgias and could not move. Stopped Lotemax.    Stent obstructed, Ahmed valve placed, Dr Eric Gomez. Awaiting to get abx gtt rx post op. Planning to have second Ahmed valve 2/13/24. Needs to lower HR prior.     Now  bpm. Multiple attempts to lower HR but not effective. Metoprolol dose increased to 50 mg PO BID on Friday. Dry mouth today.     1/26/24 cards: Patient presents for f/u. Doing well overall. No change in CV symptoms. She is in rapid aflutter today. Recommended ED for IV treatment/workup, she declined. She is allergic and intolerant to several medications/anti-arrhythmics which complicates management. Followed by EP, declined repeat ablation. Will try to increase Lopressor to 50 mg BID and assess response. She has f/u with PCP next week, will see what HR is at that visit. No clearance at present time.     Takes metolazone rarely, not recently. Still has  midodrine to take if BP drops but does not like to take this.           Review of Systems   Constitutional:  Positive for activity change and fatigue. Negative for appetite change and fever.   HENT:  Negative for congestion.    Respiratory:  Positive for shortness of breath. Negative for cough and chest tightness.    Cardiovascular:  Positive for palpitations. Negative for chest pain and leg swelling.   Gastrointestinal:  Negative for abdominal pain.   Genitourinary:  Negative for difficulty urinating and dysuria.   Musculoskeletal:  Negative for arthralgias and myalgias.   Neurological:  Negative for dizziness and headaches.         The following were reviewed: Active problem list, medication list, allergies, family history, social history, and Health Maintenance.       Medications:  Current Outpatient Medications on File Prior to Visit   Medication Sig Dispense Refill    acetaminophen (TYLENOL) 500 MG tablet Take 500 mg by mouth nightly as needed.      ALPRAZolam (XANAX) 0.5 MG tablet TAKE 1 TABLET (0.5 MG TOTAL) BY MOUTH NIGHTLY AS NEEDED FOR ANXIETY. 30 tablet 4    apixaban (ELIQUIS) 2.5 mg Tab Take 1 tablet (2.5 mg total) by mouth 2 (two) times daily. 180 tablet 3    b complex vitamins capsule Take 1 capsule by mouth once daily.      brimonidine 0.15 % OPTH DROP (ALPHAGAN) 0.15 % ophthalmic solution Place 1 drop into the left eye 3 (three) times daily. 5 mL 1    brinzolamide (AZOPT) 1 % ophthalmic suspension Place 1 drop into the left eye 2 (two) times a day. 5 mL 3    furosemide (LASIX) 40 MG tablet Take 40 mg by mouth 2 (two) times daily.      gatifloxacin 0.5 % Drop drops Place 1 drop into the left eye every 12 (twelve) hours. Start using the day before surgery 5 mL 1    ketorolac 0.5% (ACULAR) 0.5 % Drop Place 1 drop into the left eye 4 (four) times daily. Start drop the day before surgery. 5 mL 1    latanoprostene bunod (VYZULTA) 0.024 % Drop Apply to eye.      netarsudiL (RHOPRESSA) 0.02 %  ophthalmic solution Place 1 drop into the left eye every evening. 2.5 mL 10    cholecalciferol, vitamin D3, (VITAMIN D3) 50 mcg (2,000 unit) Cap Take 1 capsule by mouth once daily.      coenzyme Q10 200 mg capsule Take 200 mg by mouth once daily.      fish oil-omega-3 fatty acids 300-1,000 mg capsule Take 1 capsule by mouth once daily.      metOLazone (ZAROXOLYN) 2.5 MG tablet Take 1 tablet (2.5 mg total) by mouth daily as needed. 30 tablet 2    [DISCONTINUED] cloNIDine (CATAPRES) 0.1 MG tablet Take 1 tablet (0.1 mg total) by mouth 2 (two) times daily as needed (systolic BP over 180). 20 tablet 1    [DISCONTINUED] isosorbide mononitrate (IMDUR) 30 MG 24 hr tablet Take 1 tablet (30 mg total) by mouth every evening. 90 tablet 1     Current Facility-Administered Medications on File Prior to Visit   Medication Dose Route Frequency Provider Last Rate Last Admin    sodium chloride 0.9% flush 3 mL  3 mL Intravenous PRN Steve Galarza PA-C        vancomycin in dextrose 5 % 1 gram/250 mL IVPB 1,000 mg  1,000 mg Intravenous On Call Procedure Vianney Fountain NP           Medications have been reviewed and reconciled with patient at visit today.    Barriers to medications present (no )    Fall since last office visit (no )      Exam:  Vitals:    01/29/24 1058   BP: (!) 108/56   Pulse: (!) 111   Temp:      Weight: 81.8 kg (180 lb 5.4 oz)   Body mass index is 30.95 kg/m².      BP Readings from Last 3 Encounters:   01/29/24 (!) 108/56   01/26/24 124/72   01/08/24 120/76     Wt Readings from Last 3 Encounters:   01/29/24 0955 81.8 kg (180 lb 5.4 oz)   01/26/24 1038 81.1 kg (178 lb 12.7 oz)   01/08/24 0953 82.1 kg (181 lb)            Physical Exam  Constitutional:       General: She is not in acute distress.     Appearance: She is not ill-appearing.   HENT:      Mouth/Throat:      Mouth: Mucous membranes are moist.   Eyes:      General: No scleral icterus.  Cardiovascular:      Rate and Rhythm: Tachycardia  present. Rhythm irregular.   Pulmonary:      Effort: Pulmonary effort is normal.      Breath sounds: Normal breath sounds.   Abdominal:      General: There is no distension.      Palpations: Abdomen is soft.      Tenderness: There is no abdominal tenderness.   Musculoskeletal:         General: No swelling.   Skin:     General: Skin is warm and dry.   Neurological:      Mental Status: She is alert. Mental status is at baseline.      Motor: No weakness.   Psychiatric:         Mood and Affect: Mood normal.       Laboratory Reviewed: (Yes)  Lab Results   Component Value Date    WBC 7.00 07/28/2023    HGB 12.2 07/28/2023    HCT 37.4 07/28/2023     07/28/2023    CHOL 170 08/04/2023    TRIG 116 08/04/2023    HDL 40 08/04/2023    ALT 14 07/28/2023    AST 19 07/28/2023     01/08/2024    K 3.8 01/08/2024     01/08/2024    CREATININE 1.8 (H) 01/08/2024    BUN 38 (H) 01/08/2024    CO2 25 01/08/2024    TSH 3.577 04/27/2023    INR 1.1 10/25/2022    HGBA1C 6.3 (H) 09/06/2023           Health Maintenance  Health Maintenance Topics with due status: Not Due       Topic Last Completion Date    Diabetes Urine Screening 03/17/2023    Lipid Panel 08/04/2023    Hemoglobin A1c 09/06/2023     Health Maintenance Due   Topic Date Due    Shingles Vaccine (1 of 2) Never done    Eye Exam  11/06/2021    TETANUS VACCINE  11/09/2021    COVID-19 Vaccine (5 - 2023-24 season) 09/01/2023           Assessment:  Problem List Items Addressed This Visit          Ophtho    COAG (chronic open-angle glaucoma) - Both Eyes     Message sent to Dr Ware's staff to schedule appt for blurred vision.   Pt will contact office for the same.            Cardiac/Vascular    Acute on chronic diastolic congestive heart failure     Weight is stable.   Continues metoprolol, furosemide.         Atrial fibrillation with RVR - Primary     Ventricular rate 111-131 today despite increased dose of metoprolol.   Some increased dyspnea. Discussed benefits of  inpatient mgmt of RVR; she declines.  Will increase metoprolol dose to 75 mg BID. Use midodrine for low BP.   To ER if sx worsen.   Is she a candidate for digoxin?          Other Visit Diagnoses       PAF (paroxysmal atrial fibrillation)  (Chronic)       Relevant Medications    metoprolol tartrate (LOPRESSOR) 25 MG tablet              Plan:  Atrial fibrillation with RVR    PAF (paroxysmal atrial fibrillation)  -     metoprolol tartrate (LOPRESSOR) 25 MG tablet; Take 3 tablets (75 mg total) by mouth 2 (two) times daily.  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 Patient expressed understanding. Patient was given the opportunity to ask questions and be an active participant in their medical care. Patient had no further questions or concerns at this time.   -Documentation of patient's health and condition was obtained from family member who was present during visit.  -Patient is an overall moderate risk for health complications from their medical conditions.       Follow up: Follow up in about 1 week (around 2/5/2024).      After visit summary printed and given to patient upon discharge.  Patient goals and care plan are included in After visit summary.    Total medical decision making time was 53 min.  The following issues were discussed: The primary encounter diagnosis was Atrial fibrillation with RVR. 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    Health maintenance needs, recent test results and goals of care discussed with pt and questions answered.

## 2024-01-29 NOTE — TELEPHONE ENCOUNTER
Called and spoke with patient and told her that [facility] will see her at [facility] tomorrow at 2:00, patient was very grateful for getting worked and for the speedy call back.

## 2024-01-29 NOTE — Clinical Note
I saw Ms Rg this morning. Vent rate was 111-131 this morning, /56. She reported mild dyspnea with 96% POx. She declined to go to ER for mgmt. I asked her to increase metoprolol to 75 mg BID and use midodrine PRN. I plan to contact her Wednesday to review VS. Any other recommendations? Is she a candidate for digoxin?

## 2024-01-29 NOTE — PATIENT INSTRUCTIONS
If you are feeling unwell, we'd like to be the first ones to know here at [facility]! Please give us a call. Same day appointments are our top priority to keep you well and out of the emergency rooms and hospitals. Call [phone] for our direct line. After hours advice is always available. Please call [phone] after hours to speak to the on-call team.      Continue to monitor home heart rate and blood pressure.

## 2024-01-29 NOTE — ASSESSMENT & PLAN NOTE
Ventricular rate 111-131 today despite increased dose of metoprolol.   Some increased dyspnea. Discussed benefits of inpatient mgmt of RVR; she declines.  Will increase metoprolol dose to 75 mg BID. Use midodrine for low BP.   To ER if sx worsen.   Is she a candidate for digoxin?

## 2024-01-30 ENCOUNTER — OFFICE VISIT (OUTPATIENT)
Dept: OPHTHALMOLOGY | Facility: CLINIC | Age: 89
End: 2024-01-30
Payer: MEDICARE

## 2024-01-30 ENCOUNTER — OFFICE VISIT (OUTPATIENT)
Dept: CARDIOLOGY | Facility: CLINIC | Age: 89
End: 2024-01-30
Payer: MEDICARE

## 2024-01-30 VITALS
BODY MASS INDEX: 30.92 KG/M2 | OXYGEN SATURATION: 98 % | DIASTOLIC BLOOD PRESSURE: 70 MMHG | WEIGHT: 180.13 LBS | SYSTOLIC BLOOD PRESSURE: 122 MMHG | HEART RATE: 128 BPM

## 2024-01-30 DIAGNOSIS — I12.9 CKD STAGE 4 SECONDARY TO HYPERTENSION: ICD-10-CM

## 2024-01-30 DIAGNOSIS — Z79.01 CHRONIC ANTICOAGULATION: ICD-10-CM

## 2024-01-30 DIAGNOSIS — I48.91 NEW ONSET A-FIB: ICD-10-CM

## 2024-01-30 DIAGNOSIS — H40.1131 PRIMARY OPEN ANGLE GLAUCOMA (POAG) OF BOTH EYES, MILD STAGE: ICD-10-CM

## 2024-01-30 DIAGNOSIS — E66.9 OBESITY (BMI 30.0-34.9): ICD-10-CM

## 2024-01-30 DIAGNOSIS — I50.33 ACUTE ON CHRONIC DIASTOLIC CONGESTIVE HEART FAILURE: ICD-10-CM

## 2024-01-30 DIAGNOSIS — R00.0 SINUS TACHYCARDIA: ICD-10-CM

## 2024-01-30 DIAGNOSIS — N19 HYPERTENSIVE HEART AND RENAL DISEASE WITH BOTH (CONGESTIVE) HEART FAILURE AND RENAL FAILURE: ICD-10-CM

## 2024-01-30 DIAGNOSIS — I13.2 HYPERTENSIVE HEART AND RENAL DISEASE WITH BOTH (CONGESTIVE) HEART FAILURE AND RENAL FAILURE: ICD-10-CM

## 2024-01-30 DIAGNOSIS — N18.4 CKD STAGE 4 SECONDARY TO HYPERTENSION: ICD-10-CM

## 2024-01-30 DIAGNOSIS — I48.92 ATRIAL FLUTTER WITH RAPID VENTRICULAR RESPONSE: Primary | ICD-10-CM

## 2024-01-30 DIAGNOSIS — Z98.890 POST-OPERATIVE STATE: Primary | ICD-10-CM

## 2024-01-30 PROCEDURE — 99024 POSTOP FOLLOW-UP VISIT: CPT | Mod: HCNC,S$GLB,, | Performed by: OPHTHALMOLOGY

## 2024-01-30 PROCEDURE — 99999 PR PBB SHADOW E&M-EST. PATIENT-LVL III: CPT | Mod: PBBFAC,HCNC,, | Performed by: OPHTHALMOLOGY

## 2024-01-30 PROCEDURE — 1159F MED LIST DOCD IN RCRD: CPT | Mod: HCNC,CPTII,S$GLB, | Performed by: OPHTHALMOLOGY

## 2024-01-30 PROCEDURE — 3288F FALL RISK ASSESSMENT DOCD: CPT | Mod: HCNC,CPTII,S$GLB, | Performed by: NURSE PRACTITIONER

## 2024-01-30 PROCEDURE — 1101F PT FALLS ASSESS-DOCD LE1/YR: CPT | Mod: HCNC,CPTII,S$GLB, | Performed by: NURSE PRACTITIONER

## 2024-01-30 PROCEDURE — 99214 OFFICE O/P EST MOD 30 MIN: CPT | Mod: HCNC,S$GLB,, | Performed by: NURSE PRACTITIONER

## 2024-01-30 PROCEDURE — 1159F MED LIST DOCD IN RCRD: CPT | Mod: HCNC,CPTII,S$GLB, | Performed by: NURSE PRACTITIONER

## 2024-01-30 PROCEDURE — 99999 PR PBB SHADOW E&M-EST. PATIENT-LVL III: CPT | Mod: PBBFAC,HCNC,, | Performed by: NURSE PRACTITIONER

## 2024-01-30 PROCEDURE — 1160F RVW MEDS BY RX/DR IN RCRD: CPT | Mod: HCNC,CPTII,S$GLB, | Performed by: OPHTHALMOLOGY

## 2024-01-30 RX ORDER — TIMOLOL MALEATE 5 MG/ML
1 SOLUTION/ DROPS OPHTHALMIC 2 TIMES DAILY
Qty: 5 ML | Refills: 12 | OUTPATIENT
Start: 2024-01-30 | End: 2024-01-30 | Stop reason: SDUPTHER

## 2024-01-30 RX ORDER — TIMOLOL MALEATE 5 MG/ML
1 SOLUTION/ DROPS OPHTHALMIC 2 TIMES DAILY
Qty: 5 ML | Refills: 12 | OUTPATIENT
Start: 2024-01-30 | End: 2024-01-30

## 2024-01-30 RX ORDER — TIMOLOL MALEATE 5 MG/ML
1 SOLUTION/ DROPS OPHTHALMIC 2 TIMES DAILY
Qty: 5 ML | Refills: 12 | OUTPATIENT
Start: 2024-01-30 | End: 2024-05-20

## 2024-01-30 NOTE — PROGRESS NOTES
SUBJECTIVE  AdrianaIfrah Metz is 89 y.o. female  Corrected distance visual acuity was 20/25 -1 in the right eye and 20/300 in the left eye.   Chief Complaint   Patient presents with    Post-op Evaluation     Pt here due to blurry VA and IOP chk. No pain or discomfort. VA is blurry. She says she noticed her vision decline about 2 weeks ago. She says it has not progressed since she first noticed it.           HPI     Post-op Evaluation     Additional comments: Pt here due to blurry VA and IOP chk. No pain or   discomfort. VA is blurry. She says she noticed her vision decline about 2   weeks ago. She says it has not progressed since she first noticed it.            Comments    1. Mild COAG Goal < 19   SLT OD 3/04 (20 to 15) + 3/11 (19 to 15)   SLT OS 5/05 (20 to 14) +5/11 (18-19 to 15) + 3/12 (min resp.) + 3/11/15   (20.5-17.5) + 3/7/18 (24- 21) + 11/3/21 (22.5-17.5)  (Cosopt, Xalatan, and Timolol cause Myalgias)   (Alphagan causes redness) (zioptan too expensive)   Possible steroid responder   Xen Gel OS 12/06/23  Ahmed Valve OS with Dr. Gomez 1/30/24  2. PCIOL OU   3. Mild Dry AMD   4. Fuch's Dystrophy   DSAEK OD 4/16 Dr. Quintanilla (followed by Dwight every summer)   DSAEK OS 8/15 Dr. Quintanilla   Rx Inveltys (1% lotemax) (IOP 15/23) 5/29/20   Dr. Quintanilla Ok'd to stop Lotemax OU  5. Dry Eye -intolerance to Restasis   6. Recent A-Fib. (from Labetolol ?)     >>>>Lotemax BID OS - Allergic, very weak, pain throughout body    Systane Ultra 4-5 tiimes daily   Travatan Z qhs os Discontinue  Rhopressa qhs OS Discontinue  Azopt qam OS Discontinue    OS: Keto QID  OS: Dorzolamide (Azopt) TID  OS: Brimonidine TID  OS: Vyzulya QHS  OS: Rhopressa QHS    +HAG             Last edited by Carmelo Banegas MA on 1/30/2024  2:01 PM.         Assessment /Plan :  1. Post-operative state s/p Xen gel OS with increased IOP  Discontinue Brimonidine due to increased heart rate  Discontinue Ketorolac  Continue Brinzolamide one drop in the left  eye 2 times a day, Vyzulta one drop in the left eye every night and Rhopressa one drop in the left eye every night  Add Timolol one drop in the left eye 2 times a day     RTC as scheduled with Dr. Gomez for surgery

## 2024-01-30 NOTE — PROGRESS NOTES
Subjective:   Patient ID:  Shirley Metz is a 89 y.o. female who presents for evaluation of No chief complaint on file.      HPI    Shirley Metz is a 89 year old female who presents to clinic for preop risk stratification.     Her current medical conditions include PAF on Eliquis, HFpEF, Anxiety, HTN, CKD Stage IV, obesity, DM Type II.     Metoprolol increased to 75 mg BID for the past 24 hours.     Not using metolazone regularly.   Using Tylenol, no NSAIDS regularly.     She does endorse recent issues with shortness of breath and fluid retention since increased heart rates. One of her eye drops were stopped to assist with improvement in HR control in an effort to approve her for surgery in February.     Using walker for fall prevention.     Denies chest pain or anginal equivalents. No shortness of breath, RUIZ or palpitations. Denies orthopnea, PND or abdominal bloating. Reports regular walking without any issues lately. NO leg swelling or claudications. No recent falls, syncope or near syncopal events. Reports compliance with medications and dietary restrictions. NO CNS complaints to suggest TIA or CVA today. No signs of abnormal bleeding on Eliquis.     Past Medical History:   Diagnosis Date    Anemia 11/29/2018    Anxiety 11/28/2017    Arthritis     Atrial fibrillation with RVR 10/9/2019    Back pain     Basal cell carcinoma 03/29/2018    left mid back    Chronic diastolic congestive heart failure 10/15/2019    CKD (chronic kidney disease) stage 3, GFR 30-59 ml/min 8/8/2016    Colon polyps     reported per patient.     Coronary artery calcification 10/5/2021    Fuchs' corneal dystrophy     General anesthetics causing adverse effect in therapeutic use     woke up during surgery and gagged on ET tube    Glaucoma     Glaucoma     Heart failure with preserved left ventricular function (HFpEF) 7/24/2018    Hyperlipidemia     Hypertension     Macular degeneration dry    Obesity     Pre-diabetes  12/4/2017    PVD (peripheral vascular disease) 4/26/2021    PVD (peripheral vascular disease) 4/26/2021    Squamous cell carcinoma 01/08/2019    left shoulder    Stenosis of carotid artery 10/5/2021    Trouble in sleeping     Type 2 diabetes mellitus without complication, without long-term current use of insulin 2/24/2021    Urinary tract infection        Past Surgical History:   Procedure Laterality Date    ABLATION OF ARRHYTHMOGENIC FOCUS FOR ATRIAL FIBRILLATION N/A 11/03/2022    Procedure: Ablation atrial fibrillation;  Surgeon: Toi Kelly MD;  Location: [facility] EP LAB;  Service: Cardiology;  Laterality: N/A;  afib, PVI/CTI, RFA, STACEY (cx if SR), DEDE, anes, MB, 3 Prep    ADENOIDECTOMY  1938    BREAST BIOPSY Left     1997. benign    BREAST CYST EXCISION Left 1997 approx    CARPAL TUNNEL RELEASE Right 2012 approx    CATARACT EXTRACTION Bilateral 1994    CHOLECYSTECTOMY  1975    CORNEAL TRANSPLANT Bilateral 08/2015 8/2015 - left; Right 4/2016    EYE SURGERY  12/06/2023    Fuch's Corneal dystrophy - had 2nd operation with Dr. Quintanilla    EYE SURGERY      Cataract wIOL    left thumb Left 2011    removed cuboid for arthritis    REVERSE TOTAL SHOULDER ARTHROPLASTY Left 08/21/2018    Procedure: ARTHROPLASTY, SHOULDER, TOTAL, REVERSE;  Surgeon: Solomon Lujan MD;  Location: [facility] OR;  Service: Orthopedics;  Laterality: Left;  WITH BICEP TENODESIS    SKIN CANCER EXCISION Left 2017    BCC removal by Dr. Valadez    SLT- OS (aka TRABECULOPLASTY) Left 05/11/2011    repeat 3/14/12    TENOTOMY Left 08/21/2018    Procedure: TENOTOMY;  Surgeon: Solomon Lujan MD;  Location: [facility] OR;  Service: Orthopedics;  Laterality: Left;    TONSILLECTOMY  1938    TRANSESOPHAGEAL ECHOCARDIOGRAM WITH POSSIBLE CARDIOVERSION (STACEY W/ POSS CARDIOVERSION) N/A 03/21/2023    Procedure: Transesophageal echo (STACEY) intra-procedure log documentation;  Surgeon: Trevon Ramirez MD;  Location: [facility] CATH LAB;  Service: Cardiology;  Laterality: N/A;     TREATMENT OF CARDIAC ARRHYTHMIA N/A 10/10/2019    Procedure: CARDIOVERSION;  Surgeon: Pete Durán MD;  Location: [facility] CATH LAB;  Service: Cardiology;  Laterality: N/A;    TREATMENT OF CARDIAC ARRHYTHMIA N/A 12/06/2019    Procedure: CARDIOVERSION;  Surgeon: Samy Castillo MD;  Location: [facility] CATH LAB;  Service: Cardiology;  Laterality: N/A;       Social History     Tobacco Use    Smoking status: Never    Smokeless tobacco: Never    Tobacco comments:     history of passive smoking from her ex-   Substance Use Topics    Alcohol use: Yes     Alcohol/week: 2.0 standard drinks of alcohol     Types: 2 Standard drinks or equivalent per week     Comment: occ    Drug use: No       Family History   Problem Relation Age of Onset    Heart disease Mother     Hypertension Mother     Cancer Father 88        sarcoma( ?[place]'s ) on leg    Deep vein thrombosis Neg Hx     Ovarian cancer Neg Hx     Breast cancer Neg Hx     Kidney disease Neg Hx     Strabismus Neg Hx     Retinal detachment Neg Hx     Macular degeneration Neg Hx     Glaucoma Neg Hx     Blindness Neg Hx     Amblyopia Neg Hx     Eczema Neg Hx     Lupus Neg Hx     Melanoma Neg Hx     Psoriasis Neg Hx     Diabetes Neg Hx     Stroke Neg Hx     Intellectual disability Neg Hx     Mental illness Neg Hx     Hyperlipidemia Neg Hx     COPD Neg Hx     Asthma Neg Hx     Depression Neg Hx     Alcohol abuse Neg Hx     Drug abuse Neg Hx        Wt Readings from Last 3 Encounters:   01/30/24 81.7 kg (180 lb 1.9 oz)   01/29/24 81.8 kg (180 lb 5.4 oz)   01/26/24 81.1 kg (178 lb 12.7 oz)     Temp Readings from Last 3 Encounters:   01/29/24 97.6 °F (36.4 °C) (Oral)   12/20/23 98 °F (36.7 °C) (Oral)   12/01/23 98 °F (36.7 °C) (Oral)     BP Readings from Last 3 Encounters:   01/30/24 122/70   01/29/24 (!) 108/56   01/26/24 124/72     Pulse Readings from Last 3 Encounters:   01/30/24 (!) 128   01/29/24 (!) 111   01/26/24 (!) 132       Current Outpatient Medications on File Prior to Visit    Medication Sig Dispense Refill    acetaminophen (TYLENOL) 500 MG tablet Take 500 mg by mouth nightly as needed.      ALPRAZolam (XANAX) 0.5 MG tablet TAKE 1 TABLET (0.5 MG TOTAL) BY MOUTH NIGHTLY AS NEEDED FOR ANXIETY. 30 tablet 4    apixaban (ELIQUIS) 2.5 mg Tab Take 1 tablet (2.5 mg total) by mouth 2 (two) times daily. 180 tablet 3    b complex vitamins capsule Take 1 capsule by mouth once daily.      brinzolamide (AZOPT) 1 % ophthalmic suspension Place 1 drop into the left eye 2 (two) times a day. 5 mL 3    cholecalciferol, vitamin D3, (VITAMIN D3) 50 mcg (2,000 unit) Cap Take 1 capsule by mouth once daily.      coenzyme Q10 200 mg capsule Take 200 mg by mouth once daily.      fish oil-omega-3 fatty acids 300-1,000 mg capsule Take 1 capsule by mouth once daily.      furosemide (LASIX) 40 MG tablet Take 40 mg by mouth 2 (two) times daily.      latanoprostene bunod (VYZULTA) 0.024 % Drop Place 1 drop into the left eye every evening.      metoprolol tartrate (LOPRESSOR) 25 MG tablet Take 3 tablets (75 mg total) by mouth 2 (two) times daily. 180 tablet 3    netarsudiL (RHOPRESSA) 0.02 % ophthalmic solution Place 1 drop into the left eye every evening. 2.5 mL 10    timolol maleate 0.5% (TIMOPTIC) 0.5 % Drop Place 1 drop into the left eye 2 (two) times daily. 5 mL 12    metOLazone (ZAROXOLYN) 2.5 MG tablet Take 1 tablet (2.5 mg total) by mouth daily as needed. 30 tablet 2    [DISCONTINUED] brimonidine 0.15 % OPTH DROP (ALPHAGAN) 0.15 % ophthalmic solution Place 1 drop into the left eye 3 (three) times daily. 5 mL 1    [DISCONTINUED] cloNIDine (CATAPRES) 0.1 MG tablet Take 1 tablet (0.1 mg total) by mouth 2 (two) times daily as needed (systolic BP over 180). 20 tablet 1    [DISCONTINUED] gatifloxacin 0.5 % Drop drops Place 1 drop into the left eye every 12 (twelve) hours. Start using the day before surgery 5 mL 1    [DISCONTINUED] isosorbide mononitrate (IMDUR) 30 MG 24 hr tablet Take 1 tablet (30 mg total) by mouth  every evening. 90 tablet 1    [DISCONTINUED] ketorolac 0.5% (ACULAR) 0.5 % Drop Place 1 drop into the left eye 4 (four) times daily. Start drop the day before surgery. 5 mL 1    [DISCONTINUED] timolol maleate 0.5% (TIMOPTIC) 0.5 % Drop Place 1 drop into the left eye 2 (two) times daily. 5 mL 12    [DISCONTINUED] timolol maleate 0.5% (TIMOPTIC) 0.5 % Drop Place 1 drop into the left eye 2 (two) times daily. 5 mL 12    [DISCONTINUED] timolol maleate 0.5% (TIMOPTIC) 0.5 % Drop Place 1 drop into the left eye 2 (two) times daily. 5 mL 12    [DISCONTINUED] timolol maleate 0.5% (TIMOPTIC) 0.5 % Drop Place 1 drop into the left eye 2 (two) times daily. 5 mL 12     Current Facility-Administered Medications on File Prior to Visit   Medication Dose Route Frequency Provider Last Rate Last Admin    sodium chloride 0.9% flush 3 mL  3 mL Intravenous PRN Steve Galarza PA-C        vancomycin in dextrose 5 % 1 gram/250 mL IVPB 1,000 mg  1,000 mg Intravenous On Call Procedure Vianney Fountain NP           Holter monitor - 48 hour    Result Date: 9/29/2023    Appears to have predominant Atrial flutter, Recommend to confirm with   12 leads ECG as there is baseline artifact    Heart rates varied between 69 and 146 BPM with an average of 106 BPM.    There were frequent PVCs totalling 2910 and averaging 60.65 per hour.    The longest RR interval was 1700 msec.    The longest run of SVT was at 128 bpm for 25 beats    During her reproted symptoms aflutter with controlled rates        Holter monitor - 48 hour    Result Date: 5/16/2023  · Trial flutter type I - possibly typical - 12 lead ECG correlation is   required.  · Outflow tract PVCs - benign ans likely ASxc  · No entries made in diary  · Details in text         Cardiac Monitor - 3-15 Day Adult (Cupid Only)    Result Date: 4/5/2023  · The patient complained of 1 episodes. The symptom(s) included shortness   of breath. The corresponding rhythm to the patient reported event was    atrial fibrillation.  · Patient Reported Event #2 Patient activated. The patient complained of 2   episodes. The corresponding rhythm to the patient reported event was   atrial fibrillation. These symptoms were associated with PVCs.  · The total Afib burden was 100%.  · The patient was asymptomatic with atrial fibrillation.     The patient was monitored for a total of 12d 2h, underlying rhythm is   AFib.  The minimum heart rate was 52 bpm; the maximum 128 bpm; the average 75   bpm.  100 % of Atrial fibrillation/Atrial flutter with longest episode of 12d   2h.  -- AV block with 0 %  There were 0 pauses, the longest pause was 0 ms at --.  0 episodes of VT were found with Longest VT at 0 s .  0 supraventricular episodes were found. Longest SVT Episode 0 s  There were a total of 03749 PVCs with 3 morphologies and 88 couplets.   Overall PVC [place] at 2.01 %  There were a total of 0 PSVCs with 0 morphologies and 0 couplets. Overall   PSVC [place] at 0 %  There is a total of 3 patient events.         Results for orders placed during the hospital encounter of 07/11/22    Echo    Interpretation Summary  · Concentric remodeling and normal systolic function.  · The estimated ejection fraction is 60%.  · Normal left ventricular diastolic function.  · Normal right ventricular size with normal right ventricular systolic function.    No results found for this or any previous visit.        Results for orders placed or performed during the hospital encounter of 01/26/24   SCHEDULED EKG 12-LEAD (to Muse)    Collection Time: 01/26/24 10:51 AM    Narrative    Test Reason : Z01.810,I11.9,I48.19,    Vent. Rate : 131 BPM     Atrial Rate : 262 BPM     P-R Int : 000 ms          QRS Dur : 078 ms      QT Int : 382 ms       P-R-T Axes : 000 104 119 degrees     QTc Int : 564 ms    Atrial flutter with 2:1 A-V conduction  Rightward axis  Marked ST abnormality, possible lateral subendocardial injury  Abnormal ECG  When compared with ECG of  29-NOV-2023 14:43,  Atrial flutter has replaced Sinus rhythm  Nonspecific T wave abnormality, worse in Anterior leads  Confirmed by IVONE CASTELLANO MD ([id number]) on 1/26/2024 7:36:52 PM    Referred By: RADHA CORREIA           Confirmed By:IVONE CASTELLANO MD         Review of Systems   Constitutional: Positive for malaise/fatigue.   HENT:  Negative for hearing loss and hoarse voice.    Eyes:  Negative for blurred vision and visual disturbance.   Cardiovascular:  Positive for dyspnea on exertion. Negative for chest pain, claudication, irregular heartbeat, leg swelling, near-syncope, orthopnea, palpitations, paroxysmal nocturnal dyspnea and syncope.   Respiratory:  Negative for cough, hemoptysis, shortness of breath, sleep disturbances due to breathing, snoring and wheezing.    Endocrine: Negative for cold intolerance and heat intolerance.   Hematologic/Lymphatic: Bruises/bleeds easily.   Skin:  Negative for color change, dry skin and nail changes.   Musculoskeletal:  Positive for arthritis and back pain. Negative for joint pain and myalgias.   Gastrointestinal:  Negative for bloating, abdominal pain, constipation, nausea and vomiting.   Genitourinary:  Negative for dysuria, flank pain, hematuria and hesitancy.   Neurological:  Negative for headaches, light-headedness, loss of balance, numbness, paresthesias and weakness.   Psychiatric/Behavioral:  Negative for altered mental status.    Allergic/Immunologic: Negative for environmental allergies.         Objective:/70 (BP Location: Right arm, Patient Position: Sitting)   Pulse (!) 128   Wt 81.7 kg (180 lb 1.9 oz)   LMP  (LMP Unknown)   SpO2 98%   BMI 30.92 kg/m²      Physical Exam  Vitals and nursing note reviewed.   Constitutional:       General: She is not in acute distress.     Appearance: Normal appearance. She is well-developed. She is obese. She is not ill-appearing.   HENT:      Head: Normocephalic and atraumatic.      Nose: Nose normal.      Mouth/Throat:       Mouth: Mucous membranes are moist.   Eyes:      Pupils: Pupils are equal, round, and reactive to light.   Neck:      Thyroid: No thyromegaly.      Vascular: No JVD.      Trachea: No tracheal deviation.   Cardiovascular:      Rate and Rhythm: Tachycardia present. Rhythm irregularly irregular.      Chest Wall: PMI is not displaced.      Pulses: Intact distal pulses.           Radial pulses are 2+ on the right side and 2+ on the left side.        Dorsalis pedis pulses are 2+ on the right side and 2+ on the left side.      Heart sounds: S1 normal and S2 normal. Heart sounds not distant. No murmur heard.  Pulmonary:      Effort: Pulmonary effort is normal. No respiratory distress.      Breath sounds: Normal breath sounds. No wheezing.   Abdominal:      General: Bowel sounds are normal. There is no distension.      Palpations: Abdomen is soft.      Tenderness: There is no abdominal tenderness.   Musculoskeletal:         General: No swelling. Normal range of motion.      Cervical back: Full passive range of motion without pain, normal range of motion and neck supple.      Right lower leg: No edema.      Left lower leg: No edema.      Right ankle: Swelling present.      Left ankle: Swelling present.   Skin:     General: Skin is warm and dry.      Capillary Refill: Capillary refill takes less than 2 seconds.      Nails: There is no clubbing.   Neurological:      General: No focal deficit present.      Mental Status: She is alert and oriented to person, place, and time.   Psychiatric:         Speech: Speech normal.         Behavior: Behavior normal.         Thought Content: Thought content normal.         Judgment: Judgment normal.         Lab Results   Component Value Date    CHOL 170 08/04/2023    CHOL 168 08/31/2022    CHOL 232 (H) 07/19/2021     Lab Results   Component Value Date    HDL 40 08/04/2023    HDL 43 08/31/2022    HDL 48 07/19/2021     Lab Results   Component Value Date    LDLCALC 106.8 08/04/2023    LDLCALC  105.4 08/31/2022    LDLCALC 155.8 07/19/2021     Lab Results   Component Value Date    TRIG 116 08/04/2023    TRIG 98 08/31/2022    TRIG 141 07/19/2021     Lab Results   Component Value Date    CHOLHDL 23.5 08/04/2023    CHOLHDL 25.6 08/31/2022    CHOLHDL 20.7 07/19/2021       Chemistry        Component Value Date/Time     01/08/2024 0915    K 3.8 01/08/2024 0915     01/08/2024 0915    CO2 25 01/08/2024 0915    BUN 38 (H) 01/08/2024 0915    CREATININE 1.8 (H) 01/08/2024 0915     (H) 01/08/2024 0915        Component Value Date/Time    CALCIUM 9.4 01/08/2024 0915    ALKPHOS 65 07/28/2023 0839    AST 19 07/28/2023 0839    ALT 14 07/28/2023 0839    BILITOT 0.8 07/28/2023 0839    ESTGFRAFRICA 30 (A) 07/26/2022 1348    EGFRNONAA 26 (A) 07/26/2022 1348          Lab Results   Component Value Date    TSH 3.577 04/27/2023     Lab Results   Component Value Date    INR 1.1 10/25/2022    INR 1.0 02/12/2020    INR 1.0 01/25/2018     Lab Results   Component Value Date    WBC 7.00 07/28/2023    HGB 12.2 07/28/2023    HCT 37.4 07/28/2023     (H) 07/28/2023     07/28/2023          Assessment:      1. Atrial flutter with rapid ventricular response    2. New onset a-fib    3. 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 Acute on chronic diastolic congestive heart failure    5. Sinus tachycardia    6. 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    7. Chronic anticoagulation    8. Obesity (BMI 30.0-34.9)        Plan:     Continue Eliquis for cardio-embolic protection  Continue Lopressor 75 mg BID for now- increased yesterday with some improvement on home log in past 24 hours.   Send patient portal message on Thursday to let me know on progress  Continue lasix daily  Profile BP HR at home    Nicole May, FNP-C [facility]

## 2024-02-01 ENCOUNTER — TELEPHONE (OUTPATIENT)
Dept: CARDIOLOGY | Facility: CLINIC | Age: 89
End: 2024-02-01
Payer: MEDICARE

## 2024-02-01 ENCOUNTER — TELEPHONE (OUTPATIENT)
Dept: PRIMARY CARE CLINIC | Facility: CLINIC | Age: 89
End: 2024-02-01
Payer: MEDICARE

## 2024-02-01 NOTE — TELEPHONE ENCOUNTER
Pt called states medication list is updated in chart     /74 average  P  104-123 average      Pt states that sob is a little better, blurred vision a little better

## 2024-02-01 NOTE — TELEPHONE ENCOUNTER
1:10p-1st attempt to reach patient about concerns with BP no answer, left message to call back to clinic. GINA

## 2024-02-01 NOTE — TELEPHONE ENCOUNTER
Saw Chelsea 2 days ago- wanted :  Pt stated she gave readings to Manda - pt did not repeat info- stated Manda took down info-   Assured pt Manda had info and would get the info from her and get it to Chelsea    Please advise - Manda  Thanks       ----- Message from Albino Cooper sent at 2/1/2024 12:56 PM CST -----  Contact: Shirley Landa is calling in regards to getting a call back to discuss concerns dealing with blood pressure.  Please call back at [phone]      Thanks

## 2024-02-02 ENCOUNTER — OFFICE VISIT (OUTPATIENT)
Dept: PRIMARY CARE CLINIC | Facility: CLINIC | Age: 89
End: 2024-02-02
Payer: MEDICARE

## 2024-02-02 ENCOUNTER — TELEPHONE (OUTPATIENT)
Dept: CARDIOLOGY | Facility: CLINIC | Age: 89
End: 2024-02-02
Payer: MEDICARE

## 2024-02-02 VITALS
TEMPERATURE: 97 F | WEIGHT: 179.38 LBS | OXYGEN SATURATION: 95 % | DIASTOLIC BLOOD PRESSURE: 72 MMHG | SYSTOLIC BLOOD PRESSURE: 114 MMHG | HEART RATE: 129 BPM | HEIGHT: 64 IN | BODY MASS INDEX: 30.63 KG/M2

## 2024-02-02 DIAGNOSIS — J84.10 PULMONARY GRANULOMA: ICD-10-CM

## 2024-02-02 DIAGNOSIS — I12.9 CKD STAGE 4 SECONDARY TO HYPERTENSION: ICD-10-CM

## 2024-02-02 DIAGNOSIS — N18.4 CKD STAGE 4 SECONDARY TO HYPERTENSION: ICD-10-CM

## 2024-02-02 DIAGNOSIS — E21.3 HYPERPARATHYROIDISM: ICD-10-CM

## 2024-02-02 DIAGNOSIS — H40.1130 CHRONIC OPEN ANGLE GLAUCOMA OF BOTH EYES, UNSPECIFIED GLAUCOMA STAGE: ICD-10-CM

## 2024-02-02 DIAGNOSIS — Z01.818 PRE-OP EVALUATION: Primary | ICD-10-CM

## 2024-02-02 DIAGNOSIS — I48.19 PERSISTENT ATRIAL FIBRILLATION: Chronic | ICD-10-CM

## 2024-02-02 DIAGNOSIS — M35.01 KERATITIS SICCA, BILATERAL: ICD-10-CM

## 2024-02-02 PROBLEM — H16.223 KERATITIS SICCA, BILATERAL: Chronic | Status: ACTIVE | Noted: 2021-02-24

## 2024-02-02 PROBLEM — I48.91 ATRIAL FIBRILLATION WITH RVR: Chronic | Status: ACTIVE | Noted: 2022-07-11

## 2024-02-02 PROBLEM — R00.0 SINUS TACHYCARDIA: Status: RESOLVED | Noted: 2023-04-26 | Resolved: 2024-02-02

## 2024-02-02 PROBLEM — R79.89 ELEVATED TROPONIN: Status: RESOLVED | Noted: 2018-03-15 | Resolved: 2024-02-02

## 2024-02-02 PROBLEM — R10.32 LLQ ABDOMINAL PAIN: Status: RESOLVED | Noted: 2021-07-15 | Resolved: 2024-02-02

## 2024-02-02 PROBLEM — R79.89 TROPONIN LEVEL ELEVATED: Status: RESOLVED | Noted: 2023-01-11 | Resolved: 2024-02-02

## 2024-02-02 PROBLEM — R74.8 ELEVATED LIVER ENZYMES: Status: RESOLVED | Noted: 2019-12-20 | Resolved: 2024-02-02

## 2024-02-02 PROBLEM — I50.33 ACUTE ON CHRONIC DIASTOLIC CONGESTIVE HEART FAILURE: Status: RESOLVED | Noted: 2023-01-11 | Resolved: 2024-02-02

## 2024-02-02 PROBLEM — E11.29 TYPE 2 DIABETES MELLITUS WITH KIDNEY COMPLICATION, WITHOUT LONG-TERM CURRENT USE OF INSULIN: Status: RESOLVED | Noted: 2021-02-24 | Resolved: 2024-02-02

## 2024-02-02 PROBLEM — I48.91 NEW ONSET A-FIB: Status: RESOLVED | Noted: 2019-10-09 | Resolved: 2024-02-02

## 2024-02-02 PROCEDURE — 99417 PROLNG OP E/M EACH 15 MIN: CPT | Mod: HCNC,S$GLB,, | Performed by: INTERNAL MEDICINE

## 2024-02-02 PROCEDURE — 1101F PT FALLS ASSESS-DOCD LE1/YR: CPT | Mod: HCNC,CPTII,S$GLB, | Performed by: INTERNAL MEDICINE

## 2024-02-02 PROCEDURE — 99215 OFFICE O/P EST HI 40 MIN: CPT | Mod: HCNC,S$GLB,, | Performed by: INTERNAL MEDICINE

## 2024-02-02 PROCEDURE — 1159F MED LIST DOCD IN RCRD: CPT | Mod: HCNC,CPTII,S$GLB, | Performed by: INTERNAL MEDICINE

## 2024-02-02 PROCEDURE — 99999 PR PBB SHADOW E&M-EST. PATIENT-LVL IV: CPT | Mod: PBBFAC,HCNC,, | Performed by: INTERNAL MEDICINE

## 2024-02-02 PROCEDURE — 3288F FALL RISK ASSESSMENT DOCD: CPT | Mod: HCNC,CPTII,S$GLB, | Performed by: INTERNAL MEDICINE

## 2024-02-02 NOTE — ASSESSMENT & PLAN NOTE
Abnormal CT chest on f/u of PET 2021 w no further CT chest - and pt declines further f/u w Pulm - resp status stable at present - discuss w pt next clinic f/u

## 2024-02-02 NOTE — PATIENT INSTRUCTIONS
If you are feeling unwell, we'd like to be the first ones to know here at [facility]! Please give us a call. Same day appointments are our top priority to keep you well and out of the emergency rooms and hospitals. Call [phone] for our direct line. After hours advice is always available. Please call [phone] after hours to speak to the on-call team.      If adding metolazone recommend taking either with or 30 minutes prior to furosemide    Consider increase metoprolol dose to 100 mg twice daily - recommend discuss w your Cardiologist    Please let us know when cleared by Cardiology to proceed with eye surgery - we can complete Pre-op form also at that time.

## 2024-02-02 NOTE — PROGRESS NOTES
Shirley Metz  02/02/2024  [id number]    Phylicia Deleon MD  Patient Care Team:  Phylicia Deleon MD as PCP - General (Internal Medicine)  Wilbert Ware MD as Consulting Physician (Ophthalmology)  Rajinder Quintanilla MD as Consulting Physician (Ophthalmology)  Rosario Valadez MD as Consulting Physician (Dermatology)  Amish Mendoza MD (Pulmonary Disease)  Jaquan Choi MD as Consulting Physician (Vascular Surgery)  Emmanuel Fish MD as Consulting Physician (Hand Surgery)  PAXTON Chaidez Jr., MD as Consulting Physician (Orthopedic Surgery)  Phylicia Deleon MD as Physician (Internal Medicine)  Heather Miller NP as Nurse Practitioner (Family Medicine)    Visit Type: Follow-up    Chief Complaint:  Chief Complaint   Patient presents with    Follow-up     Two month follow up. Pt is here for elevated heart rate.      History of Present Illness: Ms. Edmond is a 89 year old female w multiple chronic medical issues including Diastolic CHF, HTN, atherosclerosis, CKD, Afib/aflutter w RVR on Eliquis, obesity, pre-diabetes, osteopenia, and OA.    Underwent Xengel procedure L eye Dec 6th but did not tolerate Lotemax so stent closed  Now plan is for Ahmed valve placed Feb 13th but needs lower heart rate  Recently seen by new Cardiologist, Donnell, with plan for f/u again soon    Home heart rates still often up to 130 at rest     Also reports recent bad allergy reaction to smoke from air conditioner    Reports new ganglion cyst L lateral wrist - was seen by hand surgeon Dr. Fish    Redness L eye but no eye pain - does report mild discomfort L lateral periorbital region - also blurred vision past couple of weeks (both eyes)  C/o phlegm, sinus   C/o dry mouth      PHQ-4 Score: 1     From last visit w me 4/19/23   Frequent recurrent arrythmias w SOB/DUE  Tends to stop and start medications   Multiple perceived medication sensitivities     Was taking furosemide 40 mg BID  Past two weeks  furosemide 40 mg in am and a few times added furosemide 20 mg or 40 mg in pm  Plans to take metolazone prior to furosemide      Weighing self - weight has been stable but feels more edematous in trunk  Breathing has been good     PHQ-4 Score: 0     Recent appointments:   1/30/24 Cards May aflutter w RVR  1/30/24 Ophthal Jeanie post-op  1/29/24 O65+ Angela   1/26/23 Cards Donnell ziegler w RVR  1/16/24 Ophthal Jeanie post-op  1/08/24 Ophthal Jeanie post-op  1/08/24 Cards Almitawski  1/02/24 Ophthal Jeanie post-op  12/29/23 Ophthal Donnell urgent post-op  12/20/23 O65+ Angela   12/20/23 Ortho Fish    6/02/23 O65+ Angela EAWV    Upcoming appointments:  Future Appointments       Date Provider Specialty Appt Notes    3/4/2024 Phylicia Deleon MD Primary Care 1 month f/u             The following were reviewed: Active problem list, medication list, allergies, family history, social history, and Health Maintenance.     Medications have been reviewed and reconciled with patient at visit today.    Review of Systems   See HPI above    Exam:  Vitals:    02/02/24 0942   BP: 114/72   Pulse: (!) 129   Temp: 97.4 °F (36.3 °C)     Weight: 81.4 kg (179 lb 5.5 oz)   Body mass index is 30.78 kg/m².    BP Readings from Last 3 Encounters:   02/02/24 114/72   01/30/24 122/70   01/29/24 (!) 108/56      Wt Readings from Last 3 Encounters:   02/02/24 0942 81.4 kg (179 lb 5.5 oz)   01/30/24 1517 81.7 kg (180 lb 1.9 oz)   01/29/24 0955 81.8 kg (180 lb 5.4 oz)      Physical Exam  Vitals reviewed.   Constitutional:       General: She is not in acute distress.     Appearance: Normal appearance. She is obese. She is not ill-appearing.   HENT:      Head: Normocephalic and atraumatic.      Right Ear: Tympanic membrane, ear canal and external ear normal.      Left Ear: Tympanic membrane, ear canal and external ear normal.      Nose: Nose normal.      Mouth/Throat:      Mouth: Mucous membranes are moist.      Pharynx: Oropharynx is  clear.   Eyes:      General:         Right eye: No discharge.         Left eye: No discharge.      Extraocular Movements: Extraocular movements intact.      Conjunctiva/sclera: Conjunctivae normal.      Pupils: Pupils are equal, round, and reactive to light.      Comments: Injected sclera and conjunctiva L eye  glasses   Cardiovascular:      Rate and Rhythm: Tachycardia present. Rhythm irregular.      Heart sounds: No murmur heard.  Pulmonary:      Effort: Pulmonary effort is normal. No respiratory distress.      Breath sounds: No wheezing, rhonchi or rales.   Abdominal:      General: Bowel sounds are normal.      Palpations: Abdomen is soft.      Tenderness: There is no abdominal tenderness. There is no guarding.   Musculoskeletal:         General: Swelling present.      Right lower leg: Edema present.      Left lower leg: Edema present.   Skin:     General: Skin is warm and dry.      Findings: No bruising.   Neurological:      Mental Status: She is alert and oriented to person, place, and time. Mental status is at baseline.      Gait: Gait abnormal.      Comments: Ambulates w rollator for support   Psychiatric:         Mood and Affect: Mood normal.         Behavior: Behavior normal.         Thought Content: Thought content normal.        Laboratory Reviewed  Lab Results   Component Value Date    WBC 7.00 07/28/2023    HGB 12.2 07/28/2023    HCT 37.4 07/28/2023     07/28/2023     (H) 07/28/2023    CHOL 170 08/04/2023    TRIG 116 08/04/2023    HDL 40 08/04/2023    LDLCALC 106.8 08/04/2023    ALT 14 07/28/2023    AST 19 07/28/2023     01/08/2024    K 3.8 01/08/2024     01/08/2024    CREATININE 1.8 (H) 01/08/2024    BUN 38 (H) 01/08/2024    CO2 25 01/08/2024    MG 2.2 04/03/2023    TSH 3.577 04/27/2023    FREET4 1.03 04/27/2023    INR 1.1 10/25/2022    HGBA1C 6.3 (H) 09/06/2023    CRP 2.1 10/30/2023     Lab Results   Component Value Date    .2 (H) 04/27/2023    CALCIUM 9.4 01/08/2024  "   PHOS 4.0 01/08/2024      Lab Results   Component Value Date    RGGTPDLH68 454 04/27/2023     Lab Results   Component Value Date    FOLATE 8.8 04/27/2023    No results found for: "UIBC", "IRON", "TRANS", "TRANSFERRIN", "TIBC", "LABIRON", "FESATURATED"   Lab Results   Component Value Date    EGFRNORACEVR 26.3 (A) 01/08/2024    ALBUMIN 3.6 01/08/2024     (H) 04/27/2023     Lab Results   Component Value Date    [id number] 51 11/13/2017        Assessment:   89 y.o. female with multiple co-morbid illnesses here for continued follow up of medical problems.      The primary encounter diagnosis was Pre-op evaluation. 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      Plan:   1. Pre-op evaluation  Assessment & Plan:  Pending cardiac clearance - advised pt will complete paper form once cleared by cardiology      2. Pulmonary granuloma  Overview:  PET CT.    Assessment & Plan:  Abnormal CT chest on f/u of PET 2021 w no further CT chest - and pt declines further f/u w Pulm - resp status stable at present - discuss w pt next clinic f/u      3. Hyperparathyroidism  Assessment & Plan:  Mildly elevated - likely secondary to CKD4 - calcium, phos wnl      4. Keratitis sicca, bilateral  Assessment & Plan:  Managed by ophthalmology      5. 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 Chronic open angle glaucoma of both eyes, unspecified glaucoma stage  Assessment & Plan:  Cardiology advising better rate control prior to proceeding w eye surgery now scheduled for Feb 13 - cont eye drops as advised       7. Persistent atrial fibrillation  Assessment & Plan:  Cont eliquis, metoprolol - consider increase dose to 100 mg BID - advised to " discuss w cardiology - will need better rate control for cardiac clearance for planned L eye surgery           Health Maintenance         Date Due Completion Date    Shingles Vaccine (1 of 2) Never done ---    Eye Exam 11/06/2021 11/6/2020    TETANUS VACCINE 11/09/2021 11/9/2011    COVID-19 Vaccine (5 - 2023-24 season) 09/01/2023 2/15/2023    Hemoglobin A1c 03/06/2024 9/6/2023    Diabetes Urine Screening 03/17/2024 3/17/2023    Lipid Panel 08/04/2024 8/4/2023          Declines shingles CoVid and Tetanus vaccines     -Patient's lab results were reviewed and discussed with patient  -Treatment options and alternatives were discussed with the patient. Patient expressed understanding. Patient was given the opportunity to ask questions and be an active participant in their medical care. Patient had no further questions or concerns at this time.   -Patient is an overall moderate to HIGH risk for health complications from their medical conditions.     Follow up: Follow up in about 1 month (around 3/2/2024) for Follow Up w Heather if avail (or w me) .    After visit summary printed and given to patient upon discharge.  Patient care plan included in After visit summary.    TOTAL TIME evaluating and managing this patient for this encounter was greater than 70 minutes. This time was spent personally by me on some of the following activities: review of patient's past medical history, assessing age-appropriate health maintenance needs, review of any interval history, review and interpretation of lab results, review and interpretation of imaging test results, review and interpretation of cardiology test results, reviewing consulting specialist notes, obtaining history from the patient and family, examination of the patient, medication reconciliation, managing and/or ordering prescription medications, ordering imaging tests, ordering referral to subspecialty provider(s), educating patient and answering their questions about  diagnosis, treatment plan, and goals of treatment, discussing planned follow-up and final documentation of the visit. This time was exclusive of any separately billable procedures for this patient and exclusive of time spent treating any other patients.

## 2024-02-02 NOTE — ASSESSMENT & PLAN NOTE
Cont eliquis, metoprolol - consider increase dose to 100 mg BID - advised to discuss w cardiology - will need better rate control for cardiac clearance for planned L eye surgery

## 2024-02-02 NOTE — ASSESSMENT & PLAN NOTE
Cardiology advising better rate control prior to proceeding w eye surgery now scheduled for Feb 13 - cont eye drops as advised

## 2024-02-02 NOTE — TELEPHONE ENCOUNTER
Called patient. She stated that she is taking Metoprolol 75mg, she isnt having any side effects. She will continue to record readings through the weekend and will send them in on Monday to review.GINA

## 2024-02-05 ENCOUNTER — TELEPHONE (OUTPATIENT)
Dept: CARDIOLOGY | Facility: CLINIC | Age: 89
End: 2024-02-05
Payer: MEDICARE

## 2024-02-05 NOTE — TELEPHONE ENCOUNTER
Provider message  She can proceed with eye surgery at moderate CV risk.     Thanks   KE Pompa       Information faxed to Saloni Geiger at the [facility] via fax [phone]    Spoke to patient. She wanted to provider to advise on recent BP logs listed below  Feb 3rd  Morning: /81   Noon: BP: 120/75 HR   Evening /69      Feb 4th  Morning: didn't record  Noon /86   Evening /73      Feb 5th  Morning /81 HR 98  Noon /79      Patient states that he other providers has ok'd everything for her upcoming procedure on the 13th(next Tuesday ) just awaiting Cardiology. Please advise.

## 2024-02-05 NOTE — TELEPHONE ENCOUNTER
Spoke to patient. She wanted to provider to advise on recent BP logs listed below  Feb 3rd  Morning: /81   Noon: BP: 120/75 HR   Evening /69     Feb 4th  Morning: didn't record  Noon /86   Evening /73     Feb 5th  Morning /81 HR 98  Noon /79     Patient states that he other providers has ok'd everything for her upcoming procedure on the 13th(next Tuesday ) just awaiting Cardiology. Please advise.

## 2024-02-07 ENCOUNTER — TELEPHONE (OUTPATIENT)
Dept: PRIMARY CARE CLINIC | Facility: CLINIC | Age: 89
End: 2024-02-07
Payer: MEDICARE

## 2024-02-26 ENCOUNTER — OFFICE VISIT (OUTPATIENT)
Dept: PRIMARY CARE CLINIC | Facility: CLINIC | Age: 89
End: 2024-02-26
Payer: MEDICARE

## 2024-02-26 VITALS
HEART RATE: 126 BPM | WEIGHT: 177.69 LBS | SYSTOLIC BLOOD PRESSURE: 110 MMHG | DIASTOLIC BLOOD PRESSURE: 68 MMHG | TEMPERATURE: 98 F | OXYGEN SATURATION: 94 % | HEIGHT: 64 IN | BODY MASS INDEX: 30.34 KG/M2

## 2024-02-26 DIAGNOSIS — I12.9 CKD STAGE 4 SECONDARY TO HYPERTENSION: Chronic | ICD-10-CM

## 2024-02-26 DIAGNOSIS — R73.9 HYPERGLYCEMIA: ICD-10-CM

## 2024-02-26 DIAGNOSIS — N18.4 CKD STAGE 4 SECONDARY TO HYPERTENSION: Chronic | ICD-10-CM

## 2024-02-26 DIAGNOSIS — D68.9 COAGULOPATHY: Chronic | ICD-10-CM

## 2024-02-26 DIAGNOSIS — H40.1130 CHRONIC OPEN ANGLE GLAUCOMA OF BOTH EYES, UNSPECIFIED GLAUCOMA STAGE: Chronic | ICD-10-CM

## 2024-02-26 DIAGNOSIS — N19 HYPERTENSIVE HEART AND RENAL DISEASE WITH BOTH (CONGESTIVE) HEART FAILURE AND RENAL FAILURE: Chronic | ICD-10-CM

## 2024-02-26 DIAGNOSIS — I48.91 ATRIAL FIBRILLATION WITH RVR: Primary | Chronic | ICD-10-CM

## 2024-02-26 DIAGNOSIS — I13.2 HYPERTENSIVE HEART AND RENAL DISEASE WITH BOTH (CONGESTIVE) HEART FAILURE AND RENAL FAILURE: Chronic | ICD-10-CM

## 2024-02-26 PROBLEM — Z01.818 PRE-OP EVALUATION: Status: RESOLVED | Noted: 2024-01-26 | Resolved: 2024-02-26

## 2024-02-26 PROCEDURE — 99215 OFFICE O/P EST HI 40 MIN: CPT | Mod: HCNC,S$GLB,, | Performed by: INTERNAL MEDICINE

## 2024-02-26 PROCEDURE — 1159F MED LIST DOCD IN RCRD: CPT | Mod: HCNC,CPTII,S$GLB, | Performed by: INTERNAL MEDICINE

## 2024-02-26 PROCEDURE — 99999 PR PBB SHADOW E&M-EST. PATIENT-LVL III: CPT | Mod: PBBFAC,HCNC,, | Performed by: INTERNAL MEDICINE

## 2024-02-26 NOTE — ASSESSMENT & PLAN NOTE
S/p placement of Ahmad valve L eye Dr. Gomez - intolerant of may medications including eye drops he has prescribed - per pt will be 5 weeks before know if valve operational - cont close f/u w Dr. Gomez

## 2024-02-26 NOTE — ASSESSMENT & PLAN NOTE
BP good and CHF compensated at present - Cont current Rx - prefers conservative management - uses metolazone occasionally

## 2024-02-26 NOTE — PATIENT INSTRUCTIONS
If you are feeling unwell, we'd like to be the first ones to know here at [facility]! Please give us a call. Same day appointments are our top priority to keep you well and out of the emergency rooms and hospitals. Call [phone] for our direct line. After hours advice is always available. Please call [phone] after hours to speak to the on-call team.      Recommend resume B complex supplement   Ask your cardiologist about resuming Co-Q 10

## 2024-02-26 NOTE — PROGRESS NOTES
"Shirley Metz  02/26/2024  [id number]    Phylicia Deleon MD  Patient Care Team:  Phylicia Deleon MD as PCP - General (Internal Medicine)  Wilbetr Ware MD as Consulting Physician (Ophthalmology)  Rajinder Quintanilla MD as Consulting Physician (Ophthalmology)  Rosario Valadez MD as Consulting Physician (Dermatology)  Amish Mendoza MD (Pulmonary Disease)  Jaquan Choi MD as Consulting Physician (Vascular Surgery)  Emmanuel Fish MD as Consulting Physician (Hand Surgery)  PAXTON Chaidez Jr., MD as Consulting Physician (Orthopedic Surgery)  Phylicia Deleon MD as Physician (Internal Medicine)  Heather Miller NP as Nurse Practitioner (Family Medicine)    Visit Type: Follow-up    Chief Complaint:  Chief Complaint   Patient presents with    Follow-up     Pt is recovering from eye surgery.      History of Present Illness: History of Present Illness: Ms. Edmond is a 89 year old female w multiple chronic medical issues including Diastolic CHF, HTN, atherosclerosis, CKD, Afib/aflutter w RVR on Eliquis, obesity, pre-diabetes, osteopenia, and OA.    S/p Ahmad valve placement to L eye w Dr. Jason BRADY Feb 13th  Sig leticia-orbital bruising and persistent "almost" diplopia  Has been following closely w Dr. Gomez - next f/u appt in 2 days    Home /68-75  Pulse mostly     Pulse 120's here today - denies any associated chest pain, palpitations, SOB at present    PHQ-4 Score: 1     From last visit w me 2/20/24  Underwent Xengel procedure L eye Dec 6th but did not tolerate Lotemax so stent closed  Now plan is for Ahmed valve placed Feb 13th but needs lower heart rate  Recently seen by new Cardiologist, Donnell, with plan for f/u again soon  Home heart rates still often up to 130 at rest   Also reports recent bad allergy reaction to smoke from air conditioner  Reports new ganglion cyst L lateral wrist - was seen by hand surgeon Dr. Fish  Redness L eye but no eye pain - " does report mild discomfort L lateral periorbital region - also blurred vision past couple of weeks (both eyes)  C/o phlegm, sinus   C/o dry mouth  PHQ-4 Score: 1     Recent appointments:   2/02/24 O65+ Deleon  1/30/24 Cards May aflutter w RVR  1/30/24 Ophthal Jeanie post-op  1/29/24 O65+ Angela   1/26/23 Cards Donnell ziegler w RVR  1/16/24 Ophthal Jeanie post-op  1/08/24 Ophthal Jeanie post-op  1/08/24 Cards Eamon  1/02/24 Ophthal Jeanie post-op  12/29/23 Ophthal Donnell urgent post-op    6/02/23 O65+ Angela EAWV    Upcoming appointments:  Future Appointments       Date Provider Specialty Appt Notes    4/8/2024 Heather Miller NP Primary Care AWV    4/8/2024  Primary Care LAB WORK    4/29/2024 Phylicia Deleon MD Primary Care Two month f/u          The following were reviewed: Active problem list, medication list, allergies, family history, social history, and Health Maintenance.     Medications have been reviewed and reconciled with patient at visit today.    Review of Systems   See HPI above    Exam: sat 94%  Vitals:    02/26/24 0851   BP: 110/68   Pulse: (!) 126   Temp: 97.9 °F (36.6 °C)     Weight: 80.6 kg (177 lb 11.1 oz)   Body mass index is 30.5 kg/m².    BP Readings from Last 3 Encounters:   02/26/24 110/68   02/02/24 114/72   01/30/24 122/70      Wt Readings from Last 3 Encounters:   02/26/24 0851 80.6 kg (177 lb 11.1 oz)   02/02/24 0942 81.4 kg (179 lb 5.5 oz)   01/30/24 1517 81.7 kg (180 lb 1.9 oz)      Physical Exam  Vitals reviewed.   Constitutional:       General: She is not in acute distress.     Appearance: Normal appearance. She is obese. She is ill-appearing.   HENT:      Head: Normocephalic and atraumatic.      Right Ear: Tympanic membrane, ear canal and external ear normal.      Left Ear: Tympanic membrane, ear canal and external ear normal.      Ears:      Comments: Sig leticia-orbital bruising     Nose: Nose normal.      Mouth/Throat:      Mouth: Mucous membranes are moist.  "     Pharynx: Oropharynx is clear.   Eyes:      Extraocular Movements: Extraocular movements intact.      Comments: Glasses   Injected L eye   Neck:      Vascular: No carotid bruit.   Cardiovascular:      Rate and Rhythm: Tachycardia present.      Heart sounds: No murmur heard.  Pulmonary:      Effort: Pulmonary effort is normal. No respiratory distress.      Breath sounds: No wheezing, rhonchi or rales.   Abdominal:      General: Bowel sounds are normal.      Palpations: Abdomen is soft.      Tenderness: There is no abdominal tenderness. There is no guarding.   Musculoskeletal:      Right lower leg: No edema.      Left lower leg: No edema.   Lymphadenopathy:      Cervical: No cervical adenopathy.   Skin:     General: Skin is warm and dry.      Findings: Bruising present.   Neurological:      Mental Status: She is alert. Mental status is at baseline.      Gait: Gait abnormal.      Comments: Ambulates w rollator for support   Psychiatric:         Behavior: Behavior normal.        Laboratory Reviewed  Lab Results   Component Value Date    WBC 7.00 07/28/2023    HGB 12.2 07/28/2023    HCT 37.4 07/28/2023     07/28/2023     (H) 07/28/2023    CHOL 170 08/04/2023    TRIG 116 08/04/2023    HDL 40 08/04/2023    LDLCALC 106.8 08/04/2023    ALT 14 07/28/2023    AST 19 07/28/2023     01/08/2024    K 3.8 01/08/2024     01/08/2024    CREATININE 1.8 (H) 01/08/2024    BUN 38 (H) 01/08/2024    CO2 25 01/08/2024    MG 2.2 04/03/2023    TSH 3.577 04/27/2023    FREET4 1.03 04/27/2023    INR 1.1 10/25/2022    HGBA1C 6.3 (H) 09/06/2023    CRP 2.1 10/30/2023     Lab Results   Component Value Date    .2 (H) 04/27/2023    CALCIUM 9.4 01/08/2024    PHOS 4.0 01/08/2024      Lab Results   Component Value Date    QNTGKWNL15 454 04/27/2023     Lab Results   Component Value Date    FOLATE 8.8 04/27/2023    No results found for: "UIBC", "IRON", "TRANS", "TRANSFERRIN", "TIBC", "LABIRON", "FESATURATED"   Lab Results "   Component Value Date    EGFRNORACEVR 26.3 (A) 01/08/2024    ALBUMIN 3.6 01/08/2024     (H) 04/27/2023     Lab Results   Component Value Date    [id number] 51 11/13/2017          Assessment:   89 y.o. female with multiple co-morbid illnesses here for continued follow up of medical problems.      The primary encounter diagnosis was Atrial fibrillation with RVR. 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      Plan:   1. Atrial fibrillation with RVR  Assessment & Plan:  Cont metoprolol 75 mg BID - cont eliquis - cont f/u cardiology      2. Coagulopathy  Assessment & Plan:  H/o DVT L lower leg many years ago - currently prescribed eliquis for atrial fibrillation     Orders:  -     CBC Auto Differential; Future; Expected date: 02/26/2024    3. Hyperglycemia  -     Hemoglobin A1C; Future; Expected date: 02/26/2024  -     Microalbumin/creatinine urine ratio    4. 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 Chronic open angle glaucoma of both eyes, unspecified glaucoma stage  Assessment & Plan:  S/p placement of Ahmad valve L eye Dr. Gomez - intolerant of may medications including eye drops he has prescribed - per pt will be 5 weeks before know if valve operational - cont close f/u w Dr. Gomez           Health Maintenance         Date Due Completion Date    Shingles Vaccine (1 of  2) Never done ---    Eye Exam 11/06/2021 11/6/2020    TETANUS VACCINE 11/09/2021 11/9/2011    COVID-19 Vaccine (5 - 2023-24 season) 09/01/2023 2/15/2023    Hemoglobin A1c 03/06/2024 9/6/2023    Diabetes Urine Screening 03/17/2024 3/17/2023    Lipid Panel 08/04/2024 8/4/2023          Pt reports was advised not to take CoVid vaccine at this time  Declines shingles and tetanus vaccines    -Patient's lab results were reviewed and discussed with patient  -Treatment options and alternatives were discussed with the patient. Patient expressed understanding. Patient was given the opportunity to ask questions and be an active participant in their medical care. Patient had no further questions or concerns at this time.   -Patient is an overall moderate to HIGH risk for health complications from their medical conditions.     Follow up: Follow up in about 2 months (around 4/26/2024) for Follow Up w me and EAWV w NP Angela in 4-5 weeks.    After visit summary printed and given to patient upon discharge.  Patient care plan included in After visit summary.    TOTAL TIME evaluating and managing this patient for this encounter was greater than 55 minutes. This time was spent personally by me on some of the following activities: review of patient's past medical history, assessing age-appropriate health maintenance needs, review of any interval history, review and interpretation of lab results, review and interpretation of imaging test results, review and interpretation of cardiology test results, reviewing consulting specialist notes, obtaining history from the patient and family, examination of the patient, medication reconciliation, managing and/or ordering prescription medications, ordering imaging tests, ordering referral to subspecialty provider(s), educating patient and answering their questions about diagnosis, treatment plan, and goals of treatment, discussing planned follow-up and final documentation of the visit. This time  was exclusive of any separately billable procedures for this patient and exclusive of time spent treating any other patients.

## 2024-02-29 ENCOUNTER — TELEPHONE (OUTPATIENT)
Dept: CARDIOLOGY | Facility: CLINIC | Age: 89
End: 2024-02-29
Payer: MEDICARE

## 2024-02-29 NOTE — TELEPHONE ENCOUNTER
"Contacted pt regarding an appt. Informed her JERARDO Dempsey is out of clinic until June, and I could get her scheduled w/ N. VINITA Hughes on 03/12/24 (her next available). Pt states she needs to be seen sooner since her BP and HR keep going up since glaucoma procedure. Offered pt appt w/ Dr. Oneal next week, pt declined appt. Informed her I would schedule her w/ NLoretta Hughes NP on 03/12/24 and let ROMA Hughes NP know to see if she would like her seen sooner. Pt vu w/o q/c    ----- Message -----       From:Shirley Metz "Ifrah"       Sent:2/29/2024  3:05 PM CST         To:Patient Appointment Schedule Request Message List    Subject:Appointment Request    Please call me so that I can make an appointment.    [phone] or [phone].  thanks    ----- Message -----       From:Med Assistant Bradley       Sent:2/29/2024  2:11 PM CST         To:Shirley Metz "Ifrah"    Subject:Appointment Request    Good afternoon Ifrah,    Unfortunately, JERARDO Dempsey is working at the hospital until June, but I can get you scheduled with Chelsea Hughes NP at the [facility]? Just let me know what day/time    Thank you,  Mirna Torres MA    ----- Message -----       From:Shirley Metz "Ifrah"       Sent:2/29/2024  9:07 AM CST         To:Jo-Ann Jacobo PA-C    Subject:Appointment Request    Appointment Request From: Shirley Metz  With Provider: Jo-Ann Jacobo PA-C [[facility]]  Preferred Date Range: 3/1/2024 - 3/6/2024  Preferred Times: Monday Morning, Monday Afternoon, Tuesday Afternoon, Wednesday Morning, Wednesday Afternoon  Reason for visit: Office Visit  Comments:  High pulse rate has returned, recovering from glaucoma operation    "

## 2024-03-21 NOTE — PROGRESS NOTES
Subjective:   Patient ID:  Shirley Metz is a 89 y.o. female who presents for evaluation of No chief complaint on file.      HPI    Shirley Metz is a 89 year old female who presents to clinic for preop risk stratification.     Her current medical conditions include PAF on Eliquis, HFpEF, Anxiety, HTN, CKD Stage IV, obesity, DM Type II.     Metoprolol increased to 75 mg BID for the past 24 hours.     Not using metolazone regularly.   Using Tylenol, no NSAIDS regularly.     She does endorse recent issues with shortness of breath and fluid retention since increased heart rates. One of her eye drops were stopped to assist with improvement in HR control in an effort to approve her for surgery in February.     Using walker for fall prevention.     Denies chest pain or anginal equivalents. No shortness of breath, RUIZ or palpitations. Denies orthopnea, PND or abdominal bloating. Reports regular walking without any issues lately. NO leg swelling or claudications. No recent falls, syncope or near syncopal events. Reports compliance with medications and dietary restrictions. NO CNS complaints to suggest TIA or CVA today. No signs of abnormal bleeding on Eliquis.     3/22/2024 update    Shirley Metz returns for follow up.    She has had both eye surgeries.     Reviewed home log today-episodes of HR 80-90s at home, some HR in 120s at times but feels much better.     BP well controlled today in office and on home log.     Denies any chest pain or anginal equivalents. Denies any palpitations.   No signs of abnormal bleeding on eliquis.   Taking metoprolol 50 mg BID  Still not driving.     Shortness of breath stable today.     Intermittent ankle edema.  Doing well with lasix BID.  Used Zaroxolyn only once.       Using rollator. No syncope or near syncopal events. Denies any recent falls.     Has multiple complaints to neck and shoulders today, may benefit from physical therapy.   Has worsening  itching to scalp and forehead as well today.       Past Medical History:   Diagnosis Date    Acute on chronic diastolic congestive heart failure 01/11/2023    Anemia 11/29/2018    Anxiety 11/28/2017    Arthritis     Atrial fibrillation with RVR 10/09/2019    Back pain     Basal cell carcinoma 03/29/2018    left mid back    Chronic diastolic congestive heart failure 10/15/2019    CKD (chronic kidney disease) stage 3, GFR 30-59 ml/min 08/08/2016    Colon polyps     reported per patient.     Coronary artery calcification 10/05/2021    Elevated liver enzymes 12/20/2019    Elevated troponin 03/15/2018    Fuchs' corneal dystrophy     General anesthetics causing adverse effect in therapeutic use     woke up during surgery and gagged on ET tube    Glaucoma     Glaucoma     Heart failure with preserved left ventricular function (HFpEF) 07/24/2018    Hyperlipidemia     Hypertension     Macular degeneration dry    Obesity     Pre-diabetes 12/04/2017    PVD (peripheral vascular disease) 04/26/2021    PVD (peripheral vascular disease) 04/26/2021    Squamous cell carcinoma 01/08/2019    left shoulder    Stenosis of carotid artery 10/05/2021    Troponin level elevated 01/11/2023    Trouble in sleeping     Type 2 diabetes mellitus without complication, without long-term current use of insulin 02/24/2021    Urinary tract infection        Past Surgical History:   Procedure Laterality Date    ABLATION OF ARRHYTHMOGENIC FOCUS FOR ATRIAL FIBRILLATION N/A 11/03/2022    Procedure: Ablation atrial fibrillation;  Surgeon: Toi Kelly MD;  Location: [facility];  Service: Cardiology;  Laterality: N/A;  afib, PVI/CTI, RFA, STACEY (cx if SR), DEDE, anes, MB, 3 Prep    ADENOIDECTOMY  1938    BREAST BIOPSY Left     1997. benign    BREAST CYST EXCISION Left 1997 approx    CARPAL TUNNEL RELEASE Right 2012 approx    CATARACT EXTRACTION Bilateral 1994    CHOLECYSTECTOMY  1975    CORNEAL TRANSPLANT Bilateral 08/2015 8/2015 - left; Right 4/2016     EYE SURGERY  12/06/2023    Fuch's Corneal dystrophy - had 2nd operation with Dr. Quintanilla    EYE SURGERY      Cataract wIOL    left thumb Left 2011    removed cuboid for arthritis    REVERSE TOTAL SHOULDER ARTHROPLASTY Left 08/21/2018    Procedure: ARTHROPLASTY, SHOULDER, TOTAL, REVERSE;  Surgeon: Solomon Lujan MD;  Location: [facility] OR;  Service: Orthopedics;  Laterality: Left;  WITH BICEP TENODESIS    SKIN CANCER EXCISION Left 2017    BCC removal by Dr. Valadze    SLT- OS (aka TRABECULOPLASTY) Left 05/11/2011    repeat 3/14/12    TENOTOMY Left 08/21/2018    Procedure: TENOTOMY;  Surgeon: Solomon Lujan MD;  Location: [facility] OR;  Service: Orthopedics;  Laterality: Left;    TONSILLECTOMY  1938    TRANSESOPHAGEAL ECHOCARDIOGRAM WITH POSSIBLE CARDIOVERSION (STACEY W/ POSS CARDIOVERSION) N/A 03/21/2023    Procedure: Transesophageal echo (STACEY) intra-procedure log documentation;  Surgeon: Trevon Ramirez MD;  Location: [facility] CATH LAB;  Service: Cardiology;  Laterality: N/A;    TREATMENT OF CARDIAC ARRHYTHMIA N/A 10/10/2019    Procedure: CARDIOVERSION;  Surgeon: Pete Durán MD;  Location: [facility] CATH LAB;  Service: Cardiology;  Laterality: N/A;    TREATMENT OF CARDIAC ARRHYTHMIA N/A 12/06/2019    Procedure: CARDIOVERSION;  Surgeon: Samy Castillo MD;  Location: [facility] CATH LAB;  Service: Cardiology;  Laterality: N/A;       Social History     Tobacco Use    Smoking status: Never    Smokeless tobacco: Never    Tobacco comments:     history of passive smoking from her ex-   Substance Use Topics    Alcohol use: Yes     Alcohol/week: 2.0 standard drinks of alcohol     Types: 2 Standard drinks or equivalent per week     Comment: occ    Drug use: No       Family History   Problem Relation Age of Onset    Heart disease Mother     Hypertension Mother     Cancer Father 88        sarcoma( ?Kaposi's ) on leg    Deep vein thrombosis Neg Hx     Ovarian cancer Neg Hx     Breast cancer Neg Hx     Kidney disease Neg Hx     Strabismus Neg  Hx     Retinal detachment Neg Hx     Macular degeneration Neg Hx     Glaucoma Neg Hx     Blindness Neg Hx     Amblyopia Neg Hx     Eczema Neg Hx     Lupus Neg Hx     Melanoma Neg Hx     Psoriasis Neg Hx     Diabetes Neg Hx     Stroke Neg Hx     Intellectual disability Neg Hx     Mental illness Neg Hx     Hyperlipidemia Neg Hx     COPD Neg Hx     Asthma Neg Hx     Depression Neg Hx     Alcohol abuse Neg Hx     Drug abuse Neg Hx        Wt Readings from Last 3 Encounters:   03/22/24 80.3 kg (177 lb 0.5 oz)   02/26/24 80.6 kg (177 lb 11.1 oz)   02/02/24 81.4 kg (179 lb 5.5 oz)     Temp Readings from Last 3 Encounters:   02/26/24 97.9 °F (36.6 °C) (Oral)   02/02/24 97.4 °F (36.3 °C) (Oral)   01/29/24 97.6 °F (36.4 °C) (Oral)     BP Readings from Last 3 Encounters:   03/22/24 130/70   02/26/24 110/68   02/02/24 114/72     Pulse Readings from Last 3 Encounters:   03/22/24 (!) 122   02/26/24 (!) 126   02/02/24 (!) 129       Current Outpatient Medications on File Prior to Visit   Medication Sig Dispense Refill    acetaminophen (TYLENOL) 500 MG tablet Take 500 mg by mouth nightly as needed.      ALPRAZolam (XANAX) 0.5 MG tablet TAKE 1 TABLET (0.5 MG TOTAL) BY MOUTH NIGHTLY AS NEEDED FOR ANXIETY. 30 tablet 4    apixaban (ELIQUIS) 2.5 mg Tab Take 1 tablet (2.5 mg total) by mouth 2 (two) times daily. 180 tablet 3    brinzolamide (AZOPT) 1 % ophthalmic suspension Place 1 drop into the left eye 2 (two) times a day. 5 mL 3    calcium carbonate/vitamin D3 (VITAMIN D-3 ORAL) Take 4,000 Int'l Units/day by mouth once daily.      furosemide (LASIX) 40 MG tablet Take 40 mg by mouth 2 (two) times daily.      latanoprostene bunod (VYZULTA) 0.024 % Drop Place 1 drop into the left eye every evening.      metOLazone (ZAROXOLYN) 2.5 MG tablet Take 1 tablet (2.5 mg total) by mouth daily as needed. 30 tablet 2    netarsudiL (RHOPRESSA) 0.02 % ophthalmic solution Place 1 drop into the left eye every evening. 2.5 mL 10    timolol maleate 0.5%  (TIMOPTIC) 0.5 % Drop Place 1 drop into the left eye 2 (two) times daily. 5 mL 12    [DISCONTINUED] cloNIDine (CATAPRES) 0.1 MG tablet Take 1 tablet (0.1 mg total) by mouth 2 (two) times daily as needed (systolic BP over 180). 20 tablet 1    [DISCONTINUED] isosorbide mononitrate (IMDUR) 30 MG 24 hr tablet Take 1 tablet (30 mg total) by mouth every evening. 90 tablet 1    [DISCONTINUED] metoprolol tartrate (LOPRESSOR) 25 MG tablet Take 3 tablets (75 mg total) by mouth 2 (two) times daily. 180 tablet 3     No current facility-administered medications on file prior to visit.       Holter monitor - 48 hour    Result Date: 9/29/2023    Appears to have predominant Atrial flutter, Recommend to confirm with   12 leads ECG as there is baseline artifact    Heart rates varied between 69 and 146 BPM with an average of 106 BPM.    There were frequent PVCs totalling 2910 and averaging 60.65 per hour.    The longest RR interval was 1700 msec.    The longest run of SVT was at 128 bpm for 25 beats    During her reproted symptoms aflutter with controlled rates        Holter monitor - 48 hour    Result Date: 5/16/2023  · Trial flutter type I - possibly typical - 12 lead ECG correlation is   required.  · Outflow tract PVCs - benign ans likely ASxc  · No entries made in diary  · Details in text         Cardiac Monitor - 3-15 Day Adult (Cupid Only)    Result Date: 4/5/2023  · The patient complained of 1 episodes. The symptom(s) included shortness   of breath. The corresponding rhythm to the patient reported event was   atrial fibrillation.  · Patient Reported Event #2 Patient activated. The patient complained of 2   episodes. The corresponding rhythm to the patient reported event was   atrial fibrillation. These symptoms were associated with PVCs.  · The total Afib burden was 100%.  · The patient was asymptomatic with atrial fibrillation.     The patient was monitored for a total of 12d 2h, underlying rhythm is   AFib.  The minimum heart  rate was 52 bpm; the maximum 128 bpm; the average 75   bpm.  100 % of Atrial fibrillation/Atrial flutter with longest episode of 12d   2h.  -- AV block with 0 %  There were 0 pauses, the longest pause was 0 ms at --.  0 episodes of VT were found with Longest VT at 0 s .  0 supraventricular episodes were found. Longest SVT Episode 0 s  There were a total of 66600 PVCs with 3 morphologies and 88 couplets.   Overall PVC [place] at 2.01 %  There were a total of 0 PSVCs with 0 morphologies and 0 couplets. Overall   PSVC [place] at 0 %  There is a total of 3 patient events.         Results for orders placed during the hospital encounter of 07/11/22    Echo    Interpretation Summary  · Concentric remodeling and normal systolic function.  · The estimated ejection fraction is 60%.  · Normal left ventricular diastolic function.  · Normal right ventricular size with normal right ventricular systolic function.    No results found for this or any previous visit.        Results for orders placed or performed during the hospital encounter of 01/26/24   SCHEDULED EKG 12-LEAD (to Muse)    Collection Time: 01/26/24 10:51 AM    Narrative    Test Reason : Z01.810,I11.9,I48.19,    Vent. Rate : 131 BPM     Atrial Rate : 262 BPM     P-R Int : 000 ms          QRS Dur : 078 ms      QT Int : 382 ms       P-R-T Axes : 000 104 119 degrees     QTc Int : 564 ms    Atrial flutter with 2:1 A-V conduction  Rightward axis  Marked ST abnormality, possible lateral subendocardial injury  Abnormal ECG  When compared with ECG of 29-NOV-2023 14:43,  Atrial flutter has replaced Sinus rhythm  Nonspecific T wave abnormality, worse in Anterior leads  Confirmed by IVONE CASTELLANO MD ([id number]) on 1/26/2024 7:36:52 PM    Referred By: RADHA CORREIA           Confirmed By:IVONE CASTELLANO MD         Review of Systems   Constitutional: Positive for malaise/fatigue.   HENT:  Negative for hearing loss and hoarse voice.    Eyes:  Negative for blurred vision and visual disturbance.    Cardiovascular:  Positive for dyspnea on exertion. Negative for chest pain, claudication, irregular heartbeat, leg swelling, near-syncope, orthopnea, palpitations, paroxysmal nocturnal dyspnea and syncope.   Respiratory:  Negative for cough, hemoptysis, shortness of breath, sleep disturbances due to breathing, snoring and wheezing.    Endocrine: Negative for cold intolerance and heat intolerance.   Hematologic/Lymphatic: Bruises/bleeds easily.   Skin:  Negative for color change, dry skin and nail changes.   Musculoskeletal:  Positive for arthritis and back pain. Negative for joint pain and myalgias.   Gastrointestinal:  Negative for bloating, abdominal pain, constipation, nausea and vomiting.   Genitourinary:  Negative for dysuria, flank pain, hematuria and hesitancy.   Neurological:  Negative for headaches, light-headedness, loss of balance, numbness, paresthesias and weakness.   Psychiatric/Behavioral:  Negative for altered mental status.    Allergic/Immunologic: Negative for environmental allergies.         Objective:/70 (BP Location: Right arm, Patient Position: Sitting)   Pulse (!) 122   Wt 80.3 kg (177 lb 0.5 oz)   LMP  (LMP Unknown)   SpO2 97%   BMI 30.39 kg/m²      Physical Exam  Vitals and nursing note reviewed.   Constitutional:       General: She is not in acute distress.     Appearance: Normal appearance. She is well-developed. She is obese. She is not ill-appearing.   HENT:      Head: Normocephalic and atraumatic.      Nose: Nose normal.      Mouth/Throat:      Mouth: Mucous membranes are moist.   Eyes:      Pupils: Pupils are equal, round, and reactive to light.   Neck:      Thyroid: No thyromegaly.      Vascular: No JVD.      Trachea: No tracheal deviation.   Cardiovascular:      Rate and Rhythm: Tachycardia present. Rhythm irregularly irregular.      Chest Wall: PMI is not displaced.      Pulses: Intact distal pulses.           Radial pulses are 2+ on the right side and 2+ on the left  side.        Dorsalis pedis pulses are 2+ on the right side and 2+ on the left side.      Heart sounds: S1 normal and S2 normal. Heart sounds not distant. No murmur heard.  Pulmonary:      Effort: Pulmonary effort is normal. No respiratory distress.      Breath sounds: Normal breath sounds. No wheezing.   Abdominal:      General: Bowel sounds are normal. There is no distension.      Palpations: Abdomen is soft.      Tenderness: There is no abdominal tenderness.   Musculoskeletal:         General: No swelling. Normal range of motion.      Cervical back: Full passive range of motion without pain, normal range of motion and neck supple.      Right lower leg: No edema.      Left lower leg: No edema.      Right ankle: Swelling present.      Left ankle: Swelling present.   Skin:     General: Skin is warm and dry.      Capillary Refill: Capillary refill takes less than 2 seconds.      Nails: There is no clubbing.   Neurological:      General: No focal deficit present.      Mental Status: She is alert and oriented to person, place, and time.   Psychiatric:         Speech: Speech normal.         Behavior: Behavior normal.         Thought Content: Thought content normal.         Judgment: Judgment normal.         Lab Results   Component Value Date    CHOL 170 08/04/2023    CHOL 168 08/31/2022    CHOL 232 (H) 07/19/2021     Lab Results   Component Value Date    HDL 40 08/04/2023    HDL 43 08/31/2022    HDL 48 07/19/2021     Lab Results   Component Value Date    LDLCALC 106.8 08/04/2023    LDLCALC 105.4 08/31/2022    LDLCALC 155.8 07/19/2021     Lab Results   Component Value Date    TRIG 116 08/04/2023    TRIG 98 08/31/2022    TRIG 141 07/19/2021     Lab Results   Component Value Date    CHOLHDL 23.5 08/04/2023    CHOLHDL 25.6 08/31/2022    CHOLHDL 20.7 07/19/2021       Chemistry        Component Value Date/Time     01/08/2024 0915    K 3.8 01/08/2024 0915     01/08/2024 0915    CO2 25 01/08/2024 0915    BUN 38 (H)  01/08/2024 0915    CREATININE 1.8 (H) 01/08/2024 0915     (H) 01/08/2024 0915        Component Value Date/Time    CALCIUM 9.4 01/08/2024 0915    ALKPHOS 65 07/28/2023 0839    AST 19 07/28/2023 0839    ALT 14 07/28/2023 0839    BILITOT 0.8 07/28/2023 0839    ESTGFRAFRICA 30 (A) 07/26/2022 1348    EGFRNONAA 26 (A) 07/26/2022 1348          Lab Results   Component Value Date    TSH 3.577 04/27/2023     Lab Results   Component Value Date    INR 1.1 10/25/2022    INR 1.0 02/12/2020    INR 1.0 01/25/2018     Lab Results   Component Value Date    WBC 7.00 07/28/2023    HGB 12.2 07/28/2023    HCT 37.4 07/28/2023     (H) 07/28/2023     07/28/2023          Assessment:      1. Shortness of breath    2. PAF (paroxysmal atrial fibrillation)    3. Primary hypertension    4. Hypertensive left ventricular hypertrophy, without heart failure    5. Persistent atrial fibrillation    6. Thrombus of left atrial appendage    7. PVC's (premature ventricular contractions)    8. 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    9. Chronic anticoagulation          Plan:     Continue Eliquis for cardio-embolic protection  Continue Lopressor 50 mg BID for now   Continue lasix daily  Profile BP HR at home  Keep next appt with Dr Kelly as scheduled in May     Nicole May, FNP-C [facility]

## 2024-03-22 ENCOUNTER — OFFICE VISIT (OUTPATIENT)
Dept: CARDIOLOGY | Facility: CLINIC | Age: 89
End: 2024-03-22
Payer: MEDICARE

## 2024-03-22 VITALS
HEART RATE: 90 BPM | BODY MASS INDEX: 30.39 KG/M2 | SYSTOLIC BLOOD PRESSURE: 130 MMHG | OXYGEN SATURATION: 97 % | DIASTOLIC BLOOD PRESSURE: 70 MMHG | WEIGHT: 177 LBS

## 2024-03-22 DIAGNOSIS — I11.9 HYPERTENSIVE LEFT VENTRICULAR HYPERTROPHY, WITHOUT HEART FAILURE: Chronic | ICD-10-CM

## 2024-03-22 DIAGNOSIS — I49.3 PVC'S (PREMATURE VENTRICULAR CONTRACTIONS): ICD-10-CM

## 2024-03-22 DIAGNOSIS — I51.3 THROMBUS OF LEFT ATRIAL APPENDAGE: ICD-10-CM

## 2024-03-22 DIAGNOSIS — I12.9 CKD STAGE 4 SECONDARY TO HYPERTENSION: Chronic | ICD-10-CM

## 2024-03-22 DIAGNOSIS — N18.4 CKD STAGE 4 SECONDARY TO HYPERTENSION: Chronic | ICD-10-CM

## 2024-03-22 DIAGNOSIS — I48.19 PERSISTENT ATRIAL FIBRILLATION: Chronic | ICD-10-CM

## 2024-03-22 DIAGNOSIS — I48.0 PAF (PAROXYSMAL ATRIAL FIBRILLATION): Chronic | ICD-10-CM

## 2024-03-22 DIAGNOSIS — R06.02 SHORTNESS OF BREATH: Primary | ICD-10-CM

## 2024-03-22 DIAGNOSIS — Z79.01 CHRONIC ANTICOAGULATION: ICD-10-CM

## 2024-03-22 DIAGNOSIS — I10 PRIMARY HYPERTENSION: Chronic | ICD-10-CM

## 2024-03-22 PROCEDURE — 99999 PR PBB SHADOW E&M-EST. PATIENT-LVL II: CPT | Mod: PBBFAC,HCNC,, | Performed by: NURSE PRACTITIONER

## 2024-03-22 PROCEDURE — 99214 OFFICE O/P EST MOD 30 MIN: CPT | Mod: HCNC,S$GLB,, | Performed by: NURSE PRACTITIONER

## 2024-03-22 PROCEDURE — 1101F PT FALLS ASSESS-DOCD LE1/YR: CPT | Mod: HCNC,CPTII,S$GLB, | Performed by: NURSE PRACTITIONER

## 2024-03-22 PROCEDURE — 3288F FALL RISK ASSESSMENT DOCD: CPT | Mod: HCNC,CPTII,S$GLB, | Performed by: NURSE PRACTITIONER

## 2024-03-22 RX ORDER — METOPROLOL TARTRATE 25 MG/1
50 TABLET, FILM COATED ORAL 2 TIMES DAILY
Qty: 180 TABLET | Refills: 3
Start: 2024-03-22 | End: 2025-03-22

## 2024-04-03 ENCOUNTER — PATIENT MESSAGE (OUTPATIENT)
Dept: OPHTHALMOLOGY | Facility: CLINIC | Age: 89
End: 2024-04-03
Payer: MEDICARE

## 2024-04-08 ENCOUNTER — OFFICE VISIT (OUTPATIENT)
Dept: PRIMARY CARE CLINIC | Facility: CLINIC | Age: 89
End: 2024-04-08
Payer: MEDICARE

## 2024-04-08 VITALS
BODY MASS INDEX: 30.35 KG/M2 | TEMPERATURE: 98 F | WEIGHT: 177.81 LBS | OXYGEN SATURATION: 93 % | HEIGHT: 64 IN | HEART RATE: 77 BPM | DIASTOLIC BLOOD PRESSURE: 68 MMHG | SYSTOLIC BLOOD PRESSURE: 118 MMHG

## 2024-04-08 DIAGNOSIS — H40.1130 CHRONIC OPEN ANGLE GLAUCOMA OF BOTH EYES, UNSPECIFIED GLAUCOMA STAGE: Chronic | ICD-10-CM

## 2024-04-08 DIAGNOSIS — I48.19 PERSISTENT ATRIAL FIBRILLATION: Chronic | ICD-10-CM

## 2024-04-08 DIAGNOSIS — I10 PRIMARY HYPERTENSION: Chronic | ICD-10-CM

## 2024-04-08 DIAGNOSIS — I13.2 HYPERTENSIVE HEART AND RENAL DISEASE WITH BOTH (CONGESTIVE) HEART FAILURE AND RENAL FAILURE: Chronic | ICD-10-CM

## 2024-04-08 DIAGNOSIS — E21.3 HYPERPARATHYROIDISM: ICD-10-CM

## 2024-04-08 DIAGNOSIS — N19 HYPERTENSIVE HEART AND RENAL DISEASE WITH BOTH (CONGESTIVE) HEART FAILURE AND RENAL FAILURE: Chronic | ICD-10-CM

## 2024-04-08 DIAGNOSIS — M47.816 LUMBAR SPONDYLOSIS: Primary | ICD-10-CM

## 2024-04-08 DIAGNOSIS — R20.8 OTHER DISTURBANCES OF SKIN SENSATION: ICD-10-CM

## 2024-04-08 DIAGNOSIS — R91.8 PULMONARY NODULES/LESIONS, MULTIPLE: ICD-10-CM

## 2024-04-08 PROCEDURE — 99999 PR PBB SHADOW E&M-EST. PATIENT-LVL IV: CPT | Mod: PBBFAC,HCNC,, | Performed by: NURSE PRACTITIONER

## 2024-04-08 PROCEDURE — 1101F PT FALLS ASSESS-DOCD LE1/YR: CPT | Mod: HCNC,CPTII,S$GLB, | Performed by: NURSE PRACTITIONER

## 2024-04-08 PROCEDURE — G0439 PPPS, SUBSEQ VISIT: HCPCS | Mod: HCNC,S$GLB,, | Performed by: NURSE PRACTITIONER

## 2024-04-08 PROCEDURE — 3288F FALL RISK ASSESSMENT DOCD: CPT | Mod: HCNC,CPTII,S$GLB, | Performed by: NURSE PRACTITIONER

## 2024-04-08 PROCEDURE — 1158F ADVNC CARE PLAN TLK DOCD: CPT | Mod: HCNC,CPTII,S$GLB, | Performed by: NURSE PRACTITIONER

## 2024-04-08 RX ORDER — VITAMIN B COMPLEX
1 CAPSULE ORAL DAILY
COMMUNITY

## 2024-04-08 RX ORDER — FUROSEMIDE 40 MG/1
TABLET ORAL
Qty: 180 TABLET | Refills: 0 | Status: SHIPPED | OUTPATIENT
Start: 2024-04-08

## 2024-04-08 NOTE — PROGRESS NOTES
"Shirley Metz presented for a follow-up Medicare AWV today. The following components were reviewed and updated:    Medical history  Family History  Social history  Allergies and Current Medications  Health Risk Assessment  Health Maintenance  Care Team    **See Completed Assessments for Annual Wellness visit with in the encounter summary    The following assessments were completed:  Depression Screening  Cognitive function Screening  Timed Get Up Test  Whisper Test        Has had two eye surgeries. Most recently seen by Dr Gomez. Ahmed valve? inserted. Not a great experience. Now planning to see a retina specialist. Still soreness above left eyebrow postop.     BP low at times.   HR typically 90s-120. Up to 3 metoprolol daily, didn't tolerate. Low BP in AM at times.     Metoprolol - lips cracking, dry skin, bowels irregular.   Lots of itching lately, scalp, back, forehead, various places on her body. No rash. Onset remote, waxes/wanes. Using unscented soaps, detergent, etc.     Painful bony protuberance to left wrist.   Muscle pains, widespread, after allergic reactions. Lasts a few days. No particular joint pain. Previous aldolase, CPK, CRP and ESR normal.    Intermittent weakness, good days and bad. Not dyspneic. Taken metolazone twice in 6 weeks to aid with dyspnea. Effective. Taking Lasix 40 q AM and 20 q PM each day.     No bleeding difficulty, no unusual bruises.     Vitals:    04/08/24 0855   BP: 118/68   BP Location: Right arm   Patient Position: Sitting   Pulse: 77   Temp: 97.8 °F (36.6 °C)   TempSrc: Oral   SpO2: (!) 93%   Weight: 80.7 kg (177 lb 12.8 oz)   Height: 5' 4" (1.626 m)     Body mass index is 30.52 kg/m².   ]    Advance Care Planning     Date: 04/08/2024    Power of   I initiated the process of voluntary advance care planning today and explained the importance of this process to the patient.  I introduced the concept of advance directives to the patient, as well. Then the patient " received detailed information about the importance of designating a Health Care Power of  (HCPOA). She was also instructed to communicate with this person about their wishes for future healthcare, should she become sick and lose decision-making capacity. The patient has not previously appointed a HCPOA. After our discussion, the patient has not decided to complete a HCPOA and has appointed her  n/a - has previous HC POA , health care agent:  n/a  & health care agent number:  n/a  I spent a total time of n/a minutes discussing this issue with the patient.       Son and daughter-in-law have HC POA.   Would not want any ventilator support. Would not want feeding tube placed for conditions from which she is not expected to recover.          Muscle fasciculations bilateral calves. Painful.     Diagnoses and health risks identified today and associated recommendations/orders:  Problem List Items Addressed This Visit          Neuro    Lumbar spondylosis - Primary    Relevant Orders    FOLATE    VITAMIN B12       Ophtho    COAG (chronic open-angle glaucoma) - Both Eyes (Chronic)     Problematic recently, difficulty tolerating gtts.   Followed closely by ophth.             Pulmonary    Pulmonary nodules/lesions, multiple     Previously followed by pulm at [facility].   Last CT 11/22, no acute abn.             Cardiac/Vascular    Persistent atrial fibrillation (Chronic)     Remains in A-fib. Continues beta blocker, Eliquis.  Followed by cardiology.         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   Continues sx mgmt and GOC discussions.          HTN (hypertension) (Chronic)    Relevant Orders    RENAL FUNCTION PANEL       Endocrine    Hyperparathyroidism     Lab Results   Component Value Date    .2 (H) 04/27/2023    CALCIUM 9.4 01/08/2024    PHOS 4.0 01/08/2024               Other Visit Diagnoses        Other disturbances of skin sensation        Relevant Orders    FOLATE    VITAMIN B12            Provided Shirley with a 5-10 year written screening schedule and personal prevention plan. Recommendations were developed using the USPSTF age appropriate recommendations. Education, counseling, and referrals were provided as needed.  After Visit Summary printed and given to patient which includes a list of additional screenings\tests needed.    Follow up for Keep previously scheduled appointment..      Heather Miller NP

## 2024-04-08 NOTE — PATIENT INSTRUCTIONS
If you are feeling unwell, we'd like to be the first ones to know [facility] Please give us a call. Same day appointments are our top priority to keep you well and out of the emergency rooms and hospitals. Call [phone] for our direct line. After hours advice is always available. Please call [phone] after hours to speak to the on-call team.        If systolic blood pressure is less than 100 then consider reducing morning Lasix to 20 mg or, if in the afternoon, consider holding afternoon dose of Lasix. If you are feeling short of breath or weight is elevated or you are having significant swelling call Cardiology NP or myself.

## 2024-05-01 RX ORDER — APIXABAN 2.5 MG/1
2.5 TABLET, FILM COATED ORAL 2 TIMES DAILY
Qty: 180 TABLET | Refills: 2 | Status: SHIPPED | OUTPATIENT
Start: 2024-05-01

## 2024-05-07 NOTE — PROGRESS NOTES
Subjective   Patient ID:  Shirley Metz is a 89 y.o. female who presents for follow-up of Atrial Flutter      89 yoF referred for AF management.     8/17/22: NSR on recent ECGs up until 6/15/22. AFL and AF on ECGs afterwards starting 7/11/22. Event monitor with 100% AF, controlled average V rate. Normal EF. She had AF after a new BP agent 10/19. Her symptoms are mild. She has tried amiodarone in the past which caused side effects. She has history of CAD and has QTc 450s.     9/22: AF 9/2/22 ECG. She continues to have symptomatic AF as well as symptoms on medication. She wishes to pursue ablation.     2/23: PVI/CTI 11/3/22. She had upper lip swelling after her PVI, suspected to be due to trauma from the STACEY/ET intubations. She had some complaints about the overnight care. She was in NSR on ECGs up until 1/5/23. 1/10/23 she was in atypical AFL. She takes lasix 40 mg qd and sometimes at night. Her AF episodes are few and far between based on her home BP and HR checks. She still complains of SOB. She has a lasix plan provided by the HF clinic.     3/23: Event monitor performed but not ready for assessment. She has continued HF symptoms. She is in AF today    5/23: AFL on event monitor 4/23 and on holter 5/23. Symptoms improved since her ablation with some minor swelling    11/23: Stable AFL with 2:1 conduction on 9/23 holter. Her symptoms are stable and improved since prior to her ablation.     Interval history: AFL noted 11/23, 1/24 ECGs. Stable atypical AFL by symptoms and HR log. No syncope, near syncope.     Event monitor 9/23:  ·  Appears to have predominant Atrial flutter, Recommend to confirm with 12 leads ECG as there is baseline artifact  ·  Heart rates varied between 69 and 146 BPM with an average of 106 BPM.  ·  There were frequent PVCs totalling 2910 and averaging 60.65 per hour.  ·  The longest RR interval was 1700 msec.  ·  The longest run of SVT was at 128 bpm for 25 beats  ·  During her  reproted symptoms aflutter with controlled rates    Event monitor 4/23:  · The patient complained of 1 episodes. The symptom(s) included shortness of breath. The corresponding rhythm to the patient reported event was atrial fibrillation.  · Patient Reported Event #2 Patient activated. The patient complained of 2 episodes. The corresponding rhythm to the patient reported event was atrial fibrillation. These symptoms were associated with PVCs.  · The total Afib burden was 100%.  · The patient was asymptomatic with atrial fibrillation.    Ablation 11/22:  ·  CTI ablation.  ·  Pulmonary vein isolation.  ·  Intracardiac echo.  ·  3D mapping performed with Ensite.    Echo 7/12/22:  · Concentric remodeling and normal systolic function.  · The estimated ejection fraction is 60%.  · Normal left ventricular diastolic function.  · Normal right ventricular size with normal right ventricular systolic function.    Event monitor 7/22:  - Heart rates varied between 41 and 123 BPM with an average of 77 BPM.  - Predominant rhythm: atrial fibrillation   - Atrial Fibrillation (AF) 100.0 %  - PVC 0.48 %    Past Medical History:  01/11/2023: Acute on chronic diastolic congestive heart failure  11/29/2018: Anemia  11/28/2017: Anxiety  No date: Arthritis  10/09/2019: Atrial fibrillation with RVR  No date: Back pain  03/29/2018: Basal cell carcinoma      Comment:  left mid back  10/15/2019: Chronic diastolic congestive heart failure  08/08/2016: CKD (chronic kidney disease) stage 3, GFR 30-59 ml/min  No date: Colon polyps      Comment:  reported per patient.   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  No date: General anesthetics causing adverse effect in therapeutic use      Comment:  woke up during surgery and gagged on ET tube  No date: Glaucoma  No date: Glaucoma  07/24/2018: Heart failure with preserved left ventricular function    (HFpEF)  No date: Hyperlipidemia  No date: Hypertension  dry: Macular degeneration  No date: Obesity  12/04/2017: Pre-diabetes  04/26/2021: PVD (peripheral vascular disease)  04/26/2021: PVD (peripheral vascular disease)  01/08/2019: Squamous cell carcinoma      Comment:  left shoulder  10/05/2021: Stenosis of carotid artery  01/11/2023: Troponin level elevated  No date: Trouble in sleeping  02/24/2021: Type 2 diabetes mellitus without complication, without   long-term current use of insulin  No date: Urinary tract infection    Past Surgical History:  11/03/2022: ABLATION OF ARRHYTHMOGENIC FOCUS FOR ATRIAL FIBRILLATION;   N/A      Comment:  Procedure: Ablation atrial fibrillation;  Surgeon:                Toi Kelly MD;  Location: [facility] EP LAB;  Service:               Cardiology;  Laterality: N/A;  afib, PVI/CTI, RFA, STACEY                (cx if SR), DEDE, anes, MB, 3 Prep  1938: ADENOIDECTOMY  No date: BREAST BIOPSY; Left      Comment:  1997. benign  1997 approx: BREAST CYST EXCISION; Left  2012 approx: CARPAL TUNNEL RELEASE; Right  1994: CATARACT EXTRACTION; Bilateral  1975: CHOLECYSTECTOMY  08/2015: CORNEAL TRANSPLANT; Bilateral      Comment:  8/2015 - left; Right 4/2016 12/06/2023: EYE SURGERY      Comment:  Fuch's Corneal dystrophy - had 2nd operation with Dr. Quintanilla  No date: EYE SURGERY      Comment:  Cataract wIOL  2011: left thumb; Left      Comment:  removed cuboid for arthritis  08/21/2018: REVERSE TOTAL SHOULDER ARTHROPLASTY; Left      Comment:  Procedure: ARTHROPLASTY, SHOULDER, TOTAL, REVERSE;                 Surgeon: Solomon Lujan MD;  Location: [facility];                 Service: Orthopedics;  Laterality: Left;  WITH BICEP                TENODESIS  2017: SKIN CANCER EXCISION; Left      Comment:  BCC removal by Dr. Valadez  05/11/2011: SLT- OS (aka TRABECULOPLASTY); Left      Comment:  repeat 3/14/12  08/21/2018: TENOTOMY; Left      Comment:  Procedure: TENOTOMY;  Surgeon:  Solomon Lujan MD;                 Location: [facility] OR;  Service: Orthopedics;  Laterality:                Left;  1938: TONSILLECTOMY  03/21/2023: TRANSESOPHAGEAL ECHOCARDIOGRAM WITH POSSIBLE   CARDIOVERSION (STACEY W/ POSS CARDIOVERSION); N/A      Comment:  Procedure: Transesophageal echo (STACEY) intra-procedure                log documentation;  Surgeon: Trevon Ramirez MD;  Location:                [facility] CATH LAB;  Service: Cardiology;  Laterality: N/A;  10/10/2019: TREATMENT OF CARDIAC ARRHYTHMIA; N/A      Comment:  Procedure: CARDIOVERSION;  Surgeon: Pete Durán MD;                Location: [facility] CATH LAB;  Service: Cardiology;                 Laterality: N/A;  12/06/2019: TREATMENT OF CARDIAC ARRHYTHMIA; N/A      Comment:  Procedure: CARDIOVERSION;  Surgeon: Samy Castillo MD;                 Location: [facility] CATH LAB;  Service: Cardiology;                 Laterality: N/A;    Social History    Socioeconomic History      Marital status:       Number of children: 3    Tobacco Use      Smoking status: Never      Smokeless tobacco: Never      Tobacco comments: history of passive smoking from her ex-    Substance and Sexual Activity      Alcohol use: Yes        Alcohol/week: 2.0 standard drinks of alcohol        Types: 2 Standard drinks or equivalent per week        Comment: occ      Drug use: No      Sexual activity: Not Currently        Partners: Male        Birth control/protection: Post-menopausal        Comment: discussed protection for STD's    Other Topics      Concerns:        Are you pregnant or think you may be?: No        Breast-feeding: No    Social History Narrative       x 2,  x 1. Retired artist - previously doing jewelry/wall pieces; ; lives in [facility] [facility]; has 3 sons - 52( lives in [place], 54, and 56;exercises minimally - limited due to back issues. Still drives. Does have a Living Will.    Social Determinants of Health  Financial Resource Strain: Low  Risk  (9/21/2023)      Overall Financial Resource Strain (CARDIA)          Difficulty of Paying Living Expenses: Not hard at all  Food Insecurity: No Food Insecurity (10/17/2023)      Hunger Vital Sign          Worried About Running Out of Food in the Last Year: Never true          Ran Out of Food in the Last Year: Never true  Transportation Needs: No Transportation Needs (10/17/2023)      PRAPARE - Transportation          Lack of Transportation (Medical): No          Lack of Transportation (Non-Medical): No  Physical Activity: Unknown (10/17/2023)      Exercise Vital Sign          Days of Exercise per Week: Patient declined          Minutes of Exercise per Session: 0 min  Recent Concern: Physical Activity - Inactive (9/21/2023)      Exercise Vital Sign          Days of Exercise per Week: 0 days          Minutes of Exercise per Session: 0 min  Stress: No Stress Concern Present (10/17/2023)      [employer] - Occupational Stress Questionnaire          Feeling of Stress : Only a little  Housing Stability: Low Risk  (10/17/2023)      Housing Stability Vital Sign          Unable to Pay for Housing in the Last Year: No          Number of Places Lived in the Last Year: 1          Unstable Housing in the Last Year: No    Review of patient's family history indicates:  Problem: Heart disease      Relation: Mother          Name:               Age of Onset: (Not Specified)  Problem: Hypertension      Relation: Mother          Name:               Age of Onset: (Not Specified)  Problem: Cancer      Relation: Father          Name:               Age of Onset: 88              Comment: sarcoma( ?[place]'s ) on leg  Problem: Deep vein thrombosis      Relation: Neg Hx          Name:               Age of Onset: (Not Specified)  Problem: Ovarian cancer      Relation: Neg Hx          Name:               Age of Onset: (Not Specified)  Problem: Breast cancer      Relation: Neg Hx          Name:               Age  of Onset: (Not Specified)  Problem: Kidney disease      Relation: Neg Hx          Name:               Age of Onset: (Not Specified)  Problem: Strabismus      Relation: Neg Hx          Name:               Age of Onset: (Not Specified)  Problem: Retinal detachment      Relation: Neg Hx          Name:               Age of Onset: (Not Specified)  Problem: Macular degeneration      Relation: Neg Hx          Name:               Age of Onset: (Not Specified)  Problem: Glaucoma      Relation: Neg Hx          Name:               Age of Onset: (Not Specified)  Problem: Blindness      Relation: Neg Hx          Name:               Age of Onset: (Not Specified)  Problem: Amblyopia      Relation: Neg Hx          Name:               Age of Onset: (Not Specified)  Problem: Eczema      Relation: Neg Hx          Name:               Age of Onset: (Not Specified)  Problem: Lupus      Relation: Neg Hx          Name:               Age of Onset: (Not Specified)  Problem: Melanoma      Relation: Neg Hx          Name:               Age of Onset: (Not Specified)  Problem: Psoriasis      Relation: Neg Hx          Name:               Age of Onset: (Not Specified)  Problem: Diabetes      Relation: Neg Hx          Name:               Age of Onset: (Not Specified)  Problem: Stroke      Relation: Neg Hx          Name:               Age of Onset: (Not Specified)  Problem: Intellectual disability      Relation: Neg Hx          Name:               Age of Onset: (Not Specified)  Problem: Mental illness      Relation: Neg Hx          Name:               Age of Onset: (Not Specified)  Problem: Hyperlipidemia      Relation: Neg Hx          Name:               Age of Onset: (Not Specified)  Problem: COPD      Relation: Neg Hx          Name:               Age of Onset: (Not Specified)  Problem: Asthma      Relation: Neg Hx          Name:               Age of Onset: (Not Specified)  Problem: Depression      Relation: Neg Hx          Name:               Age  of Onset: (Not Specified)  Problem: Alcohol abuse      Relation: Neg Hx          Name:               Age of Onset: (Not Specified)  Problem: Drug abuse      Relation: Neg Hx          Name:               Age of Onset: (Not Specified)    Current Outpatient Medications:  acetaminophen (TYLENOL) 500 MG tablet, Take 500 mg by mouth nightly as needed., Disp: , Rfl:   ALPRAZolam (XANAX) 0.5 MG tablet, TAKE 1 TABLET (0.5 MG TOTAL) BY MOUTH NIGHTLY AS NEEDED FOR ANXIETY., Disp: 30 tablet, Rfl: 4  b complex vitamins capsule, Take 1 capsule by mouth once daily., Disp: , Rfl:   calcium carbonate/vitamin D3 (VITAMIN D-3 ORAL), Take 4,000 Int'l Units/day by mouth once daily., Disp: , Rfl:   ELIQUIS 2.5 mg Tab, TAKE 1 TABLET (2.5 MG TOTAL) BY MOUTH 2 (TWO) TIMES DAILY., Disp: 180 tablet, Rfl: 2  furosemide (LASIX) 40 MG tablet, TAKE 1 TABLET (40 MG TOTAL) BY MOUTH 2 TIMES A DAY., Disp: 180 tablet, Rfl: 0  metoprolol tartrate (LOPRESSOR) 25 MG tablet, Take 2 tablets (50 mg total) by mouth 2 (two) times daily., Disp: 180 tablet, Rfl: 3  latanoprostene bunod (VYZULTA) 0.024 % Drop, Place 1 drop into the left eye every evening. (Patient not taking: Reported on 5/8/2024), Disp: , Rfl:   netarsudiL (RHOPRESSA) 0.02 % ophthalmic solution, Place 1 drop into the left eye every evening. (Patient not taking: Reported on 5/8/2024), Disp: 2.5 mL, Rfl: 10  timolol maleate 0.5% (TIMOPTIC) 0.5 % Drop, Place 1 drop into the left eye 2 (two) times daily. (Patient not taking: Reported on 5/8/2024), Disp: 5 mL, Rfl: 12    No current facility-administered medications for this visit.      Review of Systems   Constitutional: Negative. Negative for diaphoresis, malaise/fatigue and weight gain.   HENT: Negative.  Negative for hoarse voice.    Eyes: Negative.  Negative for double vision and visual disturbance.   Cardiovascular:  Negative for chest pain, claudication, cyanosis, dyspnea on exertion, irregular heartbeat, leg swelling, near-syncope, orthopnea,  palpitations, paroxysmal nocturnal dyspnea and syncope.   Respiratory: Negative.  Negative for cough, shortness of breath and snoring.    Endocrine: Negative.    Hematologic/Lymphatic: Negative.  Negative for bleeding problem. Does not bruise/bleed easily.   Skin: Negative.  Negative for color change and poor wound healing.   Musculoskeletal: Negative.  Negative for muscle cramps, muscle weakness and myalgias.   Gastrointestinal: Negative.  Negative for bloating, abdominal pain, change in bowel habit, diarrhea, heartburn, hematemesis, hematochezia, melena and nausea.   Genitourinary: Negative.    Neurological: Negative.  Negative for excessive daytime sleepiness, dizziness, headaches, light-headedness, loss of balance, numbness and weakness.        HAS HOTTNESS IN BOTH FEET AND ARMS AND HANDS.    Psychiatric/Behavioral: Negative.  Negative for memory loss. The patient does not have insomnia.    Allergic/Immunologic: Negative.  Negative for hives.          Objective     Physical Exam  Vitals and nursing note reviewed.   Constitutional:       General: She is not in acute distress.     Appearance: She is well-developed. She is not diaphoretic.   HENT:      Head: Normocephalic and atraumatic.      Nose: Nose normal.   Eyes:      General: No scleral icterus.     Conjunctiva/sclera: Conjunctivae normal.   Neck:      Thyroid: No thyromegaly.      Vascular: No JVD.   Cardiovascular:      Rate and Rhythm: Tachycardia present. Rhythm irregular.      Heart sounds: S1 normal and S2 normal. No murmur heard.     No friction rub. No gallop. No S3 or S4 sounds.   Pulmonary:      Effort: Pulmonary effort is normal. No respiratory distress.      Breath sounds: Normal breath sounds. No stridor. No wheezing or rales.   Chest:      Chest wall: No tenderness.   Abdominal:      General: Bowel sounds are normal. There is no distension.      Palpations: Abdomen is soft. There is no mass.      Tenderness: There is no abdominal tenderness.  There is no rebound.   Genitourinary:     Comments: Deferred  Musculoskeletal:         General: No tenderness or deformity. Normal range of motion.      Cervical back: Normal range of motion and neck supple.   Lymphadenopathy:      Cervical: No cervical adenopathy.   Skin:     General: Skin is warm and dry.      Coloration: Skin is not pale.      Findings: No erythema or rash.   Neurological:      Mental Status: She is alert and oriented to person, place, and time.      Motor: No abnormal muscle tone.      Coordination: Coordination normal.   Psychiatric:         Behavior: Behavior normal.         Thought Content: Thought content normal.         Judgment: Judgment normal.            Assessment and Plan     1. Atrial fibrillation with RVR    2. Primary hypertension        Plan:  89 yoF here for AF management. Stable atypical AFL. Continue metoprolol and apixaban. She is hesitant about undergoing future invasive procedures. RTC 6m

## 2024-05-08 ENCOUNTER — OFFICE VISIT (OUTPATIENT)
Dept: CARDIOLOGY | Facility: CLINIC | Age: 89
End: 2024-05-08
Payer: MEDICARE

## 2024-05-08 VITALS
OXYGEN SATURATION: 96 % | HEIGHT: 64 IN | WEIGHT: 177.5 LBS | BODY MASS INDEX: 30.3 KG/M2 | HEART RATE: 109 BPM | SYSTOLIC BLOOD PRESSURE: 98 MMHG | DIASTOLIC BLOOD PRESSURE: 54 MMHG

## 2024-05-08 DIAGNOSIS — I10 PRIMARY HYPERTENSION: Chronic | ICD-10-CM

## 2024-05-08 DIAGNOSIS — F41.9 ANXIETY: ICD-10-CM

## 2024-05-08 DIAGNOSIS — I48.91 ATRIAL FIBRILLATION WITH RVR: Primary | Chronic | ICD-10-CM

## 2024-05-08 PROCEDURE — 1125F AMNT PAIN NOTED PAIN PRSNT: CPT | Mod: HCNC,CPTII,S$GLB, | Performed by: INTERNAL MEDICINE

## 2024-05-08 PROCEDURE — 1159F MED LIST DOCD IN RCRD: CPT | Mod: HCNC,CPTII,S$GLB, | Performed by: INTERNAL MEDICINE

## 2024-05-08 PROCEDURE — 99999 PR PBB SHADOW E&M-EST. PATIENT-LVL IV: CPT | Mod: PBBFAC,HCNC,, | Performed by: INTERNAL MEDICINE

## 2024-05-08 PROCEDURE — 1101F PT FALLS ASSESS-DOCD LE1/YR: CPT | Mod: HCNC,CPTII,S$GLB, | Performed by: INTERNAL MEDICINE

## 2024-05-08 PROCEDURE — 99214 OFFICE O/P EST MOD 30 MIN: CPT | Mod: HCNC,S$GLB,, | Performed by: INTERNAL MEDICINE

## 2024-05-08 PROCEDURE — 3288F FALL RISK ASSESSMENT DOCD: CPT | Mod: HCNC,CPTII,S$GLB, | Performed by: INTERNAL MEDICINE

## 2024-05-09 ENCOUNTER — OFFICE VISIT (OUTPATIENT)
Dept: OPHTHALMOLOGY | Facility: CLINIC | Age: 89
End: 2024-05-09
Payer: MEDICARE

## 2024-05-09 DIAGNOSIS — Z96.1 PSEUDOPHAKIA: ICD-10-CM

## 2024-05-09 DIAGNOSIS — H52.7 REFRACTIVE ERROR: ICD-10-CM

## 2024-05-09 DIAGNOSIS — H40.1131 PRIMARY OPEN ANGLE GLAUCOMA (POAG) OF BOTH EYES, MILD STAGE: Primary | ICD-10-CM

## 2024-05-09 PROCEDURE — 99999 PR PBB SHADOW E&M-EST. PATIENT-LVL II: CPT | Mod: PBBFAC,HCNC,, | Performed by: OPHTHALMOLOGY

## 2024-05-09 PROCEDURE — 99214 OFFICE O/P EST MOD 30 MIN: CPT | Mod: HCNC,S$GLB,, | Performed by: OPHTHALMOLOGY

## 2024-05-09 PROCEDURE — 1159F MED LIST DOCD IN RCRD: CPT | Mod: HCNC,CPTII,S$GLB, | Performed by: OPHTHALMOLOGY

## 2024-05-09 PROCEDURE — 1160F RVW MEDS BY RX/DR IN RCRD: CPT | Mod: HCNC,CPTII,S$GLB, | Performed by: OPHTHALMOLOGY

## 2024-05-09 PROCEDURE — 92015 DETERMINE REFRACTIVE STATE: CPT | Mod: HCNC,S$GLB,, | Performed by: OPHTHALMOLOGY

## 2024-05-09 RX ORDER — ALPRAZOLAM 0.5 MG/1
0.5 TABLET ORAL NIGHTLY PRN
Qty: 30 TABLET | Refills: 0 | Status: SHIPPED | OUTPATIENT
Start: 2024-05-09 | End: 2024-06-10

## 2024-05-09 NOTE — PROGRESS NOTES
SUBJECTIVE  AdrianaIfrah Metz is 89 y.o. female  Corrected distance visual acuity was 20/25 in the right eye and 20/60 in the left eye.   Chief Complaint   Patient presents with    IOP CK     Pt is here for IOP CK, pt states she saw Dr. Gomez about 2wks ago. Pt states she has double VA for about 2months now and has blurriness in OU. No pain           HPI     IOP CK     Additional comments: Pt is here for IOP CK, pt states she saw Dr. Gomez   about 2wks ago. Pt states she has double VA for about 2months now and has   blurriness in OU. No pain            Comments    1. Mild COAG Goal < 19   SLT OD 3/04 (20 to 15) + 3/11 (19 to 15)   SLT OS 5/05 (20 to 14) +5/11 (18-19 to 15) + 3/12 (min resp.) + 3/11/15   (20.5-17.5) + 3/7/18 (24- 21) + 11/3/21 (22.5-17.5)  (Cosopt, Xalatan, and Timolol cause Myalgias)   (Alphagan causes redness) (zioptan too expensive)   Possible steroid responder   Xen Gel OS 12/06/23  Ahmed Valve OS with Dr. Gomez (scheduled)  2. PCIOL OU   3. Mild Dry AMD   4. Fuch's Dystrophy   DSAEK OD 4/16 Dr. Quintanilla (followed by [facility] every summer)   DSAEK OS 8/15 Dr. Quintanilla   Rx Inveltys (1% lotemax) (IOP 15/23) 5/29/20   Dr. Quintanilla Ok'd to stop Lotemax OU  5. Dry Eye -intolerance to Restasis   6. Recent A-Fib. (from Labetolol ?)     >>>>Lotemax BID OS - Allergic, very weak, pain throughout body    Systane Ultra 4-5 tiimes daily   Travatan Z qhs os Discontinue  Rhopressa qhs OS Discontinue  Azopt qam OS Discontinue      OS: Brinzolamide BID Discontinue  OS: Timolol BID Discontinue  OS: Vyzulya QHS Discontinue  OS: Rhopressa QHS Discontinue    +HAG             Last edited by Kristian Collins on 5/9/2024 10:47 AM.         Assessment /Plan :  1. Primary open angle glaucoma (POAG) of both eyes, mild stage Intraocular pressure (IOP) not within acceptable range relative to target IOP with risk of irreversible visual loss. Better IOP control is recommended. Treatment options may include change or  additional medications, Selective Laser Trabeculoplasty (SLT laser), and/or incisional glaucoma surgery. Reviewed importance of continued compliance with treatment and follow up.     Patient chooses to change glaucoma medication by using Vyzulta one drop in the left eye every evening       Return to clinic in  4-5 months   or as needed.  With IOP Check     2.      Pseudophakia  -- Condition stable, no therapeutic change required. Monitoring routinely.  3.      Refractive Error - Rx for distance and readers

## 2024-05-17 NOTE — ASSESSMENT & PLAN NOTE
Followed by cardiology, nephrology and PCP.   Continues sx mgmt and GOC discussions.   
Lab Results   Component Value Date    .2 (H) 04/27/2023    CALCIUM 9.4 01/08/2024    PHOS 4.0 01/08/2024       
Previously followed by pulm at [facility].   Last CT 11/22, no acute abn.   
Problematic recently, difficulty tolerating gtts.   Followed closely by ophth.   
Remains in A-fib. Continues beta blocker, Eliquis.  Followed by cardiology.  
clothing/toys

## 2024-05-20 ENCOUNTER — OFFICE VISIT (OUTPATIENT)
Dept: PALLIATIVE MEDICINE | Facility: CLINIC | Age: 89
End: 2024-05-20
Payer: MEDICARE

## 2024-05-20 ENCOUNTER — LAB VISIT (OUTPATIENT)
Dept: LAB | Facility: HOSPITAL | Age: 89
End: 2024-05-20
Attending: NURSE PRACTITIONER
Payer: MEDICARE

## 2024-05-20 VITALS
SYSTOLIC BLOOD PRESSURE: 118 MMHG | OXYGEN SATURATION: 97 % | HEART RATE: 120 BPM | HEIGHT: 64 IN | WEIGHT: 177 LBS | TEMPERATURE: 98 F | BODY MASS INDEX: 30.22 KG/M2 | DIASTOLIC BLOOD PRESSURE: 78 MMHG

## 2024-05-20 DIAGNOSIS — N19 HYPERTENSIVE HEART AND RENAL DISEASE WITH BOTH (CONGESTIVE) HEART FAILURE AND RENAL FAILURE: Chronic | ICD-10-CM

## 2024-05-20 DIAGNOSIS — F41.9 ANXIETY: ICD-10-CM

## 2024-05-20 DIAGNOSIS — I13.2 HYPERTENSIVE HEART AND RENAL DISEASE WITH BOTH (CONGESTIVE) HEART FAILURE AND RENAL FAILURE: Chronic | ICD-10-CM

## 2024-05-20 DIAGNOSIS — R73.03 PREDIABETES: ICD-10-CM

## 2024-05-20 DIAGNOSIS — I48.19 PERSISTENT ATRIAL FIBRILLATION: Primary | Chronic | ICD-10-CM

## 2024-05-20 DIAGNOSIS — R29.898 COGWHEEL RIGIDITY: ICD-10-CM

## 2024-05-20 LAB
ALBUMIN SERPL BCP-MCNC: 4.1 G/DL (ref 3.5–5.2)
ALP SERPL-CCNC: 67 U/L (ref 55–135)
ALT SERPL W/O P-5'-P-CCNC: 16 U/L (ref 10–44)
ANION GAP SERPL CALC-SCNC: 10 MMOL/L (ref 8–16)
AST SERPL-CCNC: 20 U/L (ref 10–40)
BASOPHILS # BLD AUTO: 0.07 K/UL (ref 0–0.2)
BASOPHILS NFR BLD: 0.9 % (ref 0–1.9)
BILIRUB SERPL-MCNC: 0.5 MG/DL (ref 0.1–1)
BUN SERPL-MCNC: 37 MG/DL (ref 10–30)
CALCIUM SERPL-MCNC: 10.2 MG/DL (ref 8.7–10.5)
CHLORIDE SERPL-SCNC: 99 MMOL/L (ref 95–110)
CO2 SERPL-SCNC: 29 MMOL/L (ref 23–29)
CREAT SERPL-MCNC: 2 MG/DL (ref 0.5–1.4)
DIFFERENTIAL METHOD BLD: ABNORMAL
EOSINOPHIL # BLD AUTO: 0.3 K/UL (ref 0–0.5)
EOSINOPHIL NFR BLD: 3.5 % (ref 0–8)
ERYTHROCYTE [DISTWIDTH] IN BLOOD BY AUTOMATED COUNT: 13.2 % (ref 11.5–14.5)
EST. GFR  (NO RACE VARIABLE): 23.2 ML/MIN/1.73 M^2
ESTIMATED AVG GLUCOSE: 157 MG/DL (ref 68–131)
GLUCOSE SERPL-MCNC: 114 MG/DL (ref 70–110)
HBA1C MFR BLD: 7.1 % (ref 4–5.6)
HCT VFR BLD AUTO: 43.9 % (ref 37–48.5)
HGB BLD-MCNC: 14.6 G/DL (ref 12–16)
IMM GRANULOCYTES # BLD AUTO: 0.03 K/UL (ref 0–0.04)
IMM GRANULOCYTES NFR BLD AUTO: 0.4 % (ref 0–0.5)
LYMPHOCYTES # BLD AUTO: 1.9 K/UL (ref 1–4.8)
LYMPHOCYTES NFR BLD: 23.1 % (ref 18–48)
MAGNESIUM SERPL-MCNC: 2.7 MG/DL (ref 1.6–2.6)
MCH RBC QN AUTO: 34.4 PG (ref 27–31)
MCHC RBC AUTO-ENTMCNC: 33.3 G/DL (ref 32–36)
MCV RBC AUTO: 103 FL (ref 82–98)
MONOCYTES # BLD AUTO: 0.8 K/UL (ref 0.3–1)
MONOCYTES NFR BLD: 10.2 % (ref 4–15)
NEUTROPHILS # BLD AUTO: 5 K/UL (ref 1.8–7.7)
NEUTROPHILS NFR BLD: 61.9 % (ref 38–73)
NRBC BLD-RTO: 0 /100 WBC
PLATELET # BLD AUTO: 211 K/UL (ref 150–450)
PMV BLD AUTO: 10.6 FL (ref 9.2–12.9)
POTASSIUM SERPL-SCNC: 3.8 MMOL/L (ref 3.5–5.1)
PROT SERPL-MCNC: 8 G/DL (ref 6–8.4)
RBC # BLD AUTO: 4.25 M/UL (ref 4–5.4)
SODIUM SERPL-SCNC: 138 MMOL/L (ref 136–145)
WBC # BLD AUTO: 8.04 K/UL (ref 3.9–12.7)

## 2024-05-20 PROCEDURE — 3288F FALL RISK ASSESSMENT DOCD: CPT | Mod: HCNC,CPTII,S$GLB, | Performed by: NURSE PRACTITIONER

## 2024-05-20 PROCEDURE — 99215 OFFICE O/P EST HI 40 MIN: CPT | Mod: HCNC,S$GLB,, | Performed by: NURSE PRACTITIONER

## 2024-05-20 PROCEDURE — 83036 HEMOGLOBIN GLYCOSYLATED A1C: CPT | Mod: HCNC | Performed by: NURSE PRACTITIONER

## 2024-05-20 PROCEDURE — 83735 ASSAY OF MAGNESIUM: CPT | Mod: HCNC | Performed by: NURSE PRACTITIONER

## 2024-05-20 PROCEDURE — 1101F PT FALLS ASSESS-DOCD LE1/YR: CPT | Mod: HCNC,CPTII,S$GLB, | Performed by: NURSE PRACTITIONER

## 2024-05-20 PROCEDURE — 85025 COMPLETE CBC W/AUTO DIFF WBC: CPT | Mod: HCNC | Performed by: NURSE PRACTITIONER

## 2024-05-20 PROCEDURE — 36415 COLL VENOUS BLD VENIPUNCTURE: CPT | Mod: HCNC | Performed by: NURSE PRACTITIONER

## 2024-05-20 PROCEDURE — 99417 PROLNG OP E/M EACH 15 MIN: CPT | Mod: HCNC,S$GLB,, | Performed by: NURSE PRACTITIONER

## 2024-05-20 PROCEDURE — 1125F AMNT PAIN NOTED PAIN PRSNT: CPT | Mod: HCNC,CPTII,S$GLB, | Performed by: NURSE PRACTITIONER

## 2024-05-20 PROCEDURE — 80053 COMPREHEN METABOLIC PANEL: CPT | Mod: HCNC | Performed by: NURSE PRACTITIONER

## 2024-05-20 PROCEDURE — 99999 PR PBB SHADOW E&M-EST. PATIENT-LVL IV: CPT | Mod: PBBFAC,HCNC,, | Performed by: NURSE PRACTITIONER

## 2024-05-20 PROCEDURE — 1123F ACP DISCUSS/DSCN MKR DOCD: CPT | Mod: HCNC,CPTII,S$GLB, | Performed by: NURSE PRACTITIONER

## 2024-05-20 PROCEDURE — 1159F MED LIST DOCD IN RCRD: CPT | Mod: HCNC,CPTII,S$GLB, | Performed by: NURSE PRACTITIONER

## 2024-05-20 PROCEDURE — 1160F RVW MEDS BY RX/DR IN RCRD: CPT | Mod: HCNC,CPTII,S$GLB, | Performed by: NURSE PRACTITIONER

## 2024-05-20 RX ORDER — LATANOPROSTENE BUNOD 0.24 MG/ML
1 SOLUTION/ DROPS OPHTHALMIC DAILY
COMMUNITY
End: 2024-05-20 | Stop reason: SDUPTHER

## 2024-05-20 NOTE — PATIENT INSTRUCTIONS
To reduce itching try the following:  Use only mild, unscented skin cleansers.  Apply an unscented lotion such as Lubriderm or Cerave liberally.  Avoid very hot baths/showers.  If this does not work then can try over the counter Benedryl at bedtime.     Please let Dr Deleon or myself know if you have record of neurology visit. I do not any record of this since 2015. I have a note from 2021 when we discussed you seeing neurology but nothing since. At that time you were considering this.

## 2024-05-20 NOTE — Clinical Note
Hi Dr Deleon - I saw Ms Rg for Afib and HF follow up. She's actually doing very well with these sx. She is having more muscle cramping sx. I repeated Ca++ and Mg++. It's been awhile since she's had any labs drawn so just luba anything she'd be due. I also found some definite cogwheel rigidity to all 4 extremities today. This is new. No other definitive features of Parkinsonism. No meds that I see would caused rigidity. We discussed neuro referral and she is considering. If she is willing I will refer.  Hope you are well Heather

## 2024-05-20 NOTE — ASSESSMENT & PLAN NOTE
Mild-moderate, equal in 4 extremities.  No other Parkinsonian features noted.   Discussed significance with MsLoretta GrijalvaonJevon.   She has had frequent concerns of undiagnosed muscle weakness and spasticity throughout the past several years.   She feels certain she has seen neurology in the past few years and would like to look for record of this.   I cannot locate evidence of a visit in [facility], including care everywhere.

## 2024-05-20 NOTE — PROGRESS NOTES
Palliative Medicine Clinic Note        Consult Requested By: Aaareferral Self      Reason for Consult: symptom management and goals of care    Chief Complaint:   Chief Complaint   Patient presents with    C    ACP           ASSESSMENT/PLAN:      Plan/Recommendations:    Problem List Items Addressed This Visit          Psychiatric    Anxiety     Relieved by nightly alprazolam.   Have tried dose reduction previously and failed.   Pt believes benefits outweigh risks.  Does not wish to begin long acting rx.             Cardiac/Vascular    Persistent atrial fibrillation - Primary (Chronic)     Minimally elevated ventricular rate today. BP improved, previously very low.   At times difficulty managing sx but recently they have been improved.   Continues medical management with metoprolol, Eliquis. Limited tolerance of multiple medications.          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  No other Parkinsonian features noted.   Discussed significance with Ms. Flower.   She has had frequent concerns of undiagnosed muscle weakness and spasticity throughout the past several years.   She feels certain she has seen neurology in the past few years and would like to look for record of this.   I cannot locate evidence of a visit in [facility], including care everywhere.           Other Visit Diagnoses       Prediabetes        Relevant Orders    Hemoglobin A1C              Advance Care Planning   Advance Directives:   Living Will: Would not want any ventilator support. Would not want feeding tube placed for conditions from which she is not expected to recover..        Oral Declaration: Yes   Witnesses:  FABIO Martinez         [place]: No    Do Not Resuscitate Status: No    Medical Power of :  Yes (Son and daughter-in-law have HC POA.)        Oral Declaration: No      Decision Making:  Patient answered questions  Goals of Care: The patient endorses that what is most important right now is to focus on avoiding the hospital, remaining as independent as possible, and symptom/pain control    Accordingly, we have decided that the best plan to meet the patient's goals includes continuing with treatment           Thank you for involving me in the care of this patient.     Will continue to follow.    Follow up in about 3 months (around 8/20/2024).           SUBJECTIVE:      History of Present Illness / Interval History:  Shirley Metz is 89 y.o. 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  Presents to Palliative Care Clinic for physical symptoms, advance care planning,, and additional support.. Please see cardiology and PCP note for more details. She is known to me as one of her previous primary care providers.     5/20/24  History obtained from: patient and medical record        BP typically 115/78, .  In and out of Afib. Occasional palpitations, worse when she had reaction to Lotemax.   Most recent check was A-flutter. Monitoring for now.   Creatinine most recently 1.8.   Has dermatology appt.   Concerned because primary ophth is retiring. Recently saw Dr Bianchi.    Clicking right shoulder intermittently. Onset few months ago.  Pains bilateral hands. Increased Arnica use, using wrist splints nightly. This is helpful. Also using castor oil topically. Feels lump on pelvis bone, uncomfortable with sitting at times.   Neck/shoulder pain intermittently, muscular, possibly interested in PT. Pain intermittently calves and toes, mild. Relieved by topicals and heat.     Has concerns and questions calcium level. Calcium levels have been normal. Still some constipation issues.      Neurology? Ms Rg will check her notes. Believes she's seen neuro at [facility] in past few years.       Today we discussed role of palliative care, symptom management, goals of care, and advanced care planning.      ROS:  Review of Systems    Review of Symptoms      Symptom Assessment (ESAS 0-10 Scale)  Pain:  3  Dyspnea:  0  Anxiety:  0  Nausea:  0  Depression:  0  Anorexia:  0  Fatigue:  7  Insomnia:  0  Restlessness:  0  Agitation:  0     CAM / Delirium:  Negative  Constipation:  Negative  Diarrhea:  Negative      Bowel Management Plan (BMP):  No      Comments:  Insomnia adequately managed by Xanax    Pain Assessment:    Location(s): generalized    Generalized       Location: generalized        Quality: Aching and cramping        Quantity: 3/10 in intensity        Chronicity: Onset 1 month(s) ago, unchanged        Aggravating Factors: None (worse upon awakening)        Alleviating Factors: None (as noted)        Associated Symptoms: None    ECOG Performance Status stGstrstastdstest:st st1st Living Arrangements:  Lives in apartment and Lives alone    Psychosocial/Cultural:   See Palliative Psychosocial Note: Yes  Lives in independent living facility for elderly [facility]. Numerous friends. Multiple family members out of town. Independent for ADLs.  **Primary  to Follow**  Palliative Care  Consult: No            Medications:    Current Outpatient Medications:     acetaminophen (TYLENOL) 500 MG tablet, Take 500 mg by mouth nightly as needed., Disp: , Rfl:     ALPRAZolam (XANAX) 0.5 MG tablet, Take 1 tablet (0.5 mg total) by mouth nightly as needed for Anxiety., Disp: 30 tablet, Rfl: 0    b complex vitamins capsule, Take 1 capsule by mouth once daily., Disp: , Rfl:     calcium carbonate/vitamin D3 (VITAMIN D-3 ORAL), Take 4,000 Int'l Units/day by mouth once daily., Disp: , Rfl:     ELIQUIS 2.5 mg Tab, TAKE 1 TABLET (2.5 MG TOTAL) BY MOUTH 2 (TWO) TIMES DAILY., Disp: 180 tablet, Rfl: 2     furosemide (LASIX) 40 MG tablet, TAKE 1 TABLET (40 MG TOTAL) BY MOUTH 2 TIMES A DAY., Disp: 180 tablet, Rfl: 0    latanoprostene bunod (VYZULTA) 0.024 % Drop, Place 1 drop into the left eye every evening., Disp: , Rfl:     metoprolol tartrate (LOPRESSOR) 25 MG tablet, Take 2 tablets (50 mg total) by mouth 2 (two) times daily., Disp: 180 tablet, Rfl: 3      External  database queried on 05/20/2024  by Heather CHU :         Review of patient's allergies indicates:   Allergen Reactions    Carvedilol Swelling     Lip swelling    Cephalexin Other (See Comments)     Muscle/joint weakness unable to move     Doxycycline Shortness Of Breath, Nausea And Vomiting and Other (See Comments)     weakness    Erythromycin Other (See Comments)     Complete weakness/could not walk    Gadolinium-containing contrast media Swelling     angioedema    Hydrocodone-acetaminophen Shortness Of Breath     wheezing    Labetalol Shortness Of Breath, Nausea Only, Palpitations and Other (See Comments)     weakness    Loratadine Other (See Comments)     Double vision/spots  Other reaction(s): double vision    Losartan Other (See Comments)     weakness    Lotemax [loteprednol etabonate] Palpitations and Other (See Comments)     Patient stated bp went up and extreme muscle weakness    Naproxen      wheezing  wheezing  wheezing  Other reaction(s): wheezing    Statins-hmg-coa reductase inhibitors Other (See Comments)     Severe Muscle weakness  Muscle weakness  Severe Muscle weakness  Other reaction(s): severe muscle weakness    Amlodipine Other (See Comments)     Myalgias    Fenofibrate Other (See Comments)     muscle weakness      Hydralazine Other (See Comments)     muscle tremors    Hydralazine analogues Other (See Comments)     Muscle tremors/aches      Loteprednol Other (See Comments)     increased BP    Macrolide antibiotics Other (See Comments)     Complete weakness/could not walk      Moxifloxacin Hives and Other (See Comments)      muscle soreness    Timolol Other (See Comments)     Blurred vision, Burning eyes, Severe muscle weakness, eye problems.      Verapamil Diarrhea     Severe diarrhea.      Hydrocortisone     Isosorbide      Tolerates Imdur    Tetanus and diphtheria toxoids     Adhesive Rash     Clonidine patch--contact dermatitis    Codeine Diarrhea and Other (See Comments)     Loss of balance       Doxazosin Palpitations    Fexofenadine Other (See Comments)     Double vision/spots       Hydrocortisone (bulk) Other (See Comments)     Only suppository/ caused muscle weakness    Latanoprost Other (See Comments)     Muscle weakness      Olopatadine Other (See Comments)     Muscle weakness      Prednisone Anxiety           OBJECTIVE:     Physical Exam:  Vitals: Temp: 97.7 °F (36.5 °C) (05/20/24 0924)  Pulse: (!) 120 (05/20/24 0924)  BP: 118/78 (05/20/24 0924)  SpO2: 97 % (05/20/24 0924)    Physical Exam  Constitutional:       General: She is not in acute distress.     Appearance: She is not ill-appearing.   HENT:      Nose: Nose normal.      Mouth/Throat:      Mouth: Mucous membranes are moist.      Pharynx: No oropharyngeal exudate or posterior oropharyngeal erythema.   Eyes:      General: No scleral icterus.  Cardiovascular:      Rate and Rhythm: Normal rate. Rhythm irregular.      Heart sounds: Normal heart sounds.   Pulmonary:      Effort: Pulmonary effort is normal. No respiratory distress.      Breath sounds: Normal breath sounds.   Abdominal:      General: Bowel sounds are normal. There is no distension.      Palpations: Abdomen is soft.      Tenderness: There is no abdominal tenderness.   Musculoskeletal:         General: No swelling.      Cervical back: Neck supple.   Skin:     General: Skin is warm and dry.      Coloration: Skin is not jaundiced.      Findings: No bruising.   Neurological:      Mental Status: She is alert. Mental status is at baseline.      Motor: No weakness.      Gait: Gait normal.      Comments:  Mild-moderate cogwheel rigidity to bilateral elbows and knees. No tremors. Gait steady, no festination.    Psychiatric:         Mood and Affect: Mood normal.         Behavior: Behavior normal.         Thought Content: Thought content normal.         Judgment: Judgment normal.           Labs:  Lab Results   Component Value Date    WBC 7.00 07/28/2023    HGB 12.2 07/28/2023    HCT 37.4 07/28/2023     (H) 07/28/2023     07/28/2023       Sodium   Date Value Ref Range Status   01/08/2024 143 136 - 145 mmol/L Final     Potassium   Date Value Ref Range Status   01/08/2024 3.8 3.5 - 5.1 mmol/L Final     Glucose   Date Value Ref Range Status   01/08/2024 150 (H) 70 - 110 mg/dL Final     BUN   Date Value Ref Range Status   01/08/2024 38 (H) 10 - 30 mg/dL Final     Creatinine   Date Value Ref Range Status   01/08/2024 1.8 (H) 0.5 - 1.4 mg/dL Final     Calcium   Date Value Ref Range Status   01/08/2024 9.4 8.7 - 10.5 mg/dL Final     Total Protein   Date Value Ref Range Status   07/28/2023 7.6 6.0 - 8.4 g/dL Final     Albumin   Date Value Ref Range Status   01/08/2024 3.6 3.5 - 5.2 g/dL Final     AST   Date Value Ref Range Status   07/28/2023 19 10 - 40 U/L Final     ALT   Date Value Ref Range Status   07/28/2023 14 10 - 44 U/L Final     eGFR   Date Value Ref Range Status   01/08/2024 26.3 (A) >60 mL/min/1.73 m^2 Final         Imaging: n/a       I spent a total of 75 minutes on the day of the visit. This includes face to face time in discussion of goals of care, symptom assessment, coordination of care and emotional support.  This also includes non-face to face time preparing to see the patient (eg, review of tests/imaging), obtaining and/or reviewing separately obtained history, documenting clinical information in the electronic or other health record, independently interpreting results and communicating results to the patient/family/caregiver, or care coordinator.     Additional 10 min time spent on a voluntary  advance care planning and /or goals of care discussion, providing emotional support, formulating and communicating prognosis and exploring burden/benefit of various approaches of treatment.       Heather Miller, NP

## 2024-05-20 NOTE — ASSESSMENT & PLAN NOTE
Minimally elevated ventricular rate today. BP improved, previously very low.   At times difficulty managing sx but recently they have been improved.   Continues medical management with metoprolol, Eliquis. Limited tolerance of multiple medications.

## 2024-05-20 NOTE — ASSESSMENT & PLAN NOTE
Relieved by nightly alprazolam.   Have tried dose reduction previously and failed.   Pt believes benefits outweigh risks.  Does not wish to begin long acting rx.

## 2024-05-22 ENCOUNTER — PATIENT MESSAGE (OUTPATIENT)
Dept: PALLIATIVE MEDICINE | Facility: CLINIC | Age: 89
End: 2024-05-22
Payer: MEDICARE

## 2024-05-22 ENCOUNTER — TELEPHONE (OUTPATIENT)
Dept: PALLIATIVE MEDICINE | Facility: CLINIC | Age: 89
End: 2024-05-22
Payer: MEDICARE

## 2024-05-27 ENCOUNTER — PATIENT MESSAGE (OUTPATIENT)
Dept: PALLIATIVE MEDICINE | Facility: CLINIC | Age: 89
End: 2024-05-27
Payer: MEDICARE

## 2024-05-27 ENCOUNTER — TELEPHONE (OUTPATIENT)
Dept: PRIMARY CARE CLINIC | Facility: CLINIC | Age: 89
End: 2024-05-27
Payer: MEDICARE

## 2024-05-27 ENCOUNTER — PATIENT MESSAGE (OUTPATIENT)
Dept: OPHTHALMOLOGY | Facility: CLINIC | Age: 89
End: 2024-05-27
Payer: MEDICARE

## 2024-05-28 ENCOUNTER — TELEPHONE (OUTPATIENT)
Dept: PRIMARY CARE CLINIC | Facility: CLINIC | Age: 89
End: 2024-05-28
Payer: MEDICARE

## 2024-05-28 NOTE — TELEPHONE ENCOUNTER
Staff forwarded message to the nurse pool           ----- Message from Phylicia Deleon MD sent at 5/25/2024  8:20 AM CDT -----  Regarding: Palliative care f/u neuro concerns  Good day  Per Heather Ms. Sg ruhs neuro concerns  Does she want earlier appt @ [facility] than what's scheduled in July?  Does she want referral to Neuro? (Probably outside of [facility] given long wait for [facility])

## 2024-06-05 ENCOUNTER — OFFICE VISIT (OUTPATIENT)
Dept: DERMATOLOGY | Facility: CLINIC | Age: 89
End: 2024-06-05
Payer: MEDICARE

## 2024-06-05 DIAGNOSIS — L21.9 SEBORRHEIC DERMATITIS: ICD-10-CM

## 2024-06-05 DIAGNOSIS — L29.9 PRURITUS: ICD-10-CM

## 2024-06-05 DIAGNOSIS — L29.9 PRURITUS OF SCALP: ICD-10-CM

## 2024-06-05 DIAGNOSIS — Z85.828 HISTORY OF SKIN CANCER: ICD-10-CM

## 2024-06-05 DIAGNOSIS — Z12.83 SCREENING, MALIGNANT NEOPLASM, SKIN: ICD-10-CM

## 2024-06-05 DIAGNOSIS — L90.5 SCAR CONDITIONS/SKIN FIBROSIS: Primary | ICD-10-CM

## 2024-06-05 PROCEDURE — 1125F AMNT PAIN NOTED PAIN PRSNT: CPT | Mod: HCNC,CPTII,S$GLB, | Performed by: DERMATOLOGY

## 2024-06-05 PROCEDURE — 1159F MED LIST DOCD IN RCRD: CPT | Mod: HCNC,CPTII,S$GLB, | Performed by: DERMATOLOGY

## 2024-06-05 PROCEDURE — 99999 PR PBB SHADOW E&M-EST. PATIENT-LVL III: CPT | Mod: PBBFAC,HCNC,, | Performed by: DERMATOLOGY

## 2024-06-05 PROCEDURE — 3288F FALL RISK ASSESSMENT DOCD: CPT | Mod: HCNC,CPTII,S$GLB, | Performed by: DERMATOLOGY

## 2024-06-05 PROCEDURE — 1160F RVW MEDS BY RX/DR IN RCRD: CPT | Mod: HCNC,CPTII,S$GLB, | Performed by: DERMATOLOGY

## 2024-06-05 PROCEDURE — 1101F PT FALLS ASSESS-DOCD LE1/YR: CPT | Mod: HCNC,CPTII,S$GLB, | Performed by: DERMATOLOGY

## 2024-06-05 PROCEDURE — 99214 OFFICE O/P EST MOD 30 MIN: CPT | Mod: HCNC,S$GLB,, | Performed by: DERMATOLOGY

## 2024-06-05 NOTE — PROGRESS NOTES
Subjective:      Patient ID:  Shirley Metz is a 89 y.o. 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  She c/o dry patches of the front of the ears.  Uses topical lotions.   + irritation and pain. She c/o forehead and scalp pruritus.    Denies bleeding, tender, growing, or concerning lesions.     This is a high risk patient here to check for the development of new lesions.        Review of Systems   Constitutional:  Negative for fever and chills.   Gastrointestinal:  Negative for nausea and vomiting.   Skin:  Positive for activity-related sunscreen use. Negative for daily sunscreen use and recent sunburn.   Hematologic/Lymphatic: Does not bruise/bleed easily.       Objective:   Physical Exam   Constitutional: She appears well-developed and well-nourished. No distress.   Neurological: She is alert and oriented to person, place, and time. She is not disoriented.   Psychiatric: She has a normal mood and affect.   Skin:   Areas Examined (abnormalities noted in diagram):   Head / Face Inspection Performed  Neck Inspection Performed  Chest / Axilla Inspection Performed  Abdomen Inspection Performed  Back Inspection Performed  RUE Inspected  LUE Inspection Performed  RLE Inspected  LLE Inspection Performed  Nails and Digits Inspection Performed                 Diagram Legend     Erythematous scaling macule/papule c/w actinic keratosis       Vascular papule c/w angioma      Pigmented verrucoid papule/plaque c/w seborrheic keratosis      Yellow umbilicated papule c/w sebaceous hyperplasia      Irregularly shaped tan macule c/w lentigo     1-2 mm smooth white papules  consistent with Milia      Movable subcutaneous cyst with punctum c/w epidermal inclusion cyst      Subcutaneous movable cyst c/w pilar cyst      Firm pink to brown papule c/w dermatofibroma      Pedunculated fleshy papule(s) c/w skin tag(s)      Evenly pigmented macule c/w junctional nevus     Mildly variegated pigmented, slightly irregular-bordered macule c/w mildly atypical nevus      Flesh colored to evenly pigmented papule c/w intradermal nevus       Pink pearly papule/plaque c/w basal cell carcinoma      Erythematous hyperkeratotic cursted plaque c/w SCC      Surgical scar with no sign of skin cancer recurrence      Open and closed comedones      Inflammatory papules and pustules      Verrucoid papule consistent consistent with wart     Erythematous eczematous patches and plaques     Dystrophic onycholytic nail with subungual debris c/w onychomycosis     Umbilicated papule    Erythematous-base heme-crusted tan verrucoid plaque consistent with inflamed seborrheic keratosis     Erythematous Silvery Scaling Plaque c/w Psoriasis     See annotation      Assessment / Plan:        Scar conditions/skin fibrosis  Screening, malignant neoplasm, skin  History of skin cancer  Scar of the left posterior shoulder, left mid-back, hx of NMSC.  No evidence of recurrence on physical exam today.  Continue routine skin surveillance. Daily sunscreen advised.    Total body skin examination performed today including at least 12 points as noted in physical examination. No lesions suspicious for malignancy noted.      Pruritus of scalp  Pruritus  Discussed start of doxepin cream, will avoid pramoxine-HTC due to HTC allergy (muscle weakness).  Recommend CeraVe anti-itch lotion. The patient acknowledged understanding.       Seborrheic dermatitis  After further discussion, recommend restart ketoconazole cream for irritation near ear. Will avoid derm-atoc due to CTS allergy.              Follow up in about 1 year (around 6/5/2025).

## 2024-06-06 ENCOUNTER — OFFICE VISIT (OUTPATIENT)
Dept: OPHTHALMOLOGY | Facility: CLINIC | Age: 89
End: 2024-06-06
Payer: MEDICARE

## 2024-06-06 DIAGNOSIS — H16.143 PUNCTATE KERATITIS OF BOTH EYES: ICD-10-CM

## 2024-06-06 DIAGNOSIS — H52.7 REFRACTIVE ERROR: Primary | ICD-10-CM

## 2024-06-06 DIAGNOSIS — H40.1131 PRIMARY OPEN ANGLE GLAUCOMA (POAG) OF BOTH EYES, MILD STAGE: ICD-10-CM

## 2024-06-06 PROCEDURE — 99999 PR PBB SHADOW E&M-EST. PATIENT-LVL I: CPT | Mod: PBBFAC,HCNC,, | Performed by: OPHTHALMOLOGY

## 2024-06-06 PROCEDURE — 99499 UNLISTED E&M SERVICE: CPT | Mod: HCNC,S$GLB,, | Performed by: OPHTHALMOLOGY

## 2024-06-06 NOTE — PROGRESS NOTES
HPI     Follow-up     Additional comments: Pt present today for mrx check and iop check.   Complains not seeing well out of current rx          Last edited by Nader Méndez on 6/7/2024  4:13 PM.            Assessment /Plan     For exam results, see Encounter Report.    Refractive error  Mrx updated    Punctate keratitis of both eyes  Cont frequent ATs    Primary open angle glaucoma (POAG) of both eyes, mild stage  IOP well controlled. Continue current gtts. Has f/u appt with Dr. Ware, recommend she keep this appt.

## 2024-06-10 ENCOUNTER — PATIENT MESSAGE (OUTPATIENT)
Dept: PALLIATIVE MEDICINE | Facility: CLINIC | Age: 89
End: 2024-06-10
Payer: MEDICARE

## 2024-06-10 DIAGNOSIS — M19.012 BILATERAL SHOULDER REGION ARTHRITIS: Primary | ICD-10-CM

## 2024-06-10 DIAGNOSIS — F41.9 ANXIETY: ICD-10-CM

## 2024-06-10 DIAGNOSIS — M19.011 BILATERAL SHOULDER REGION ARTHRITIS: Primary | ICD-10-CM

## 2024-06-10 RX ORDER — ALPRAZOLAM 0.5 MG/1
0.5 TABLET, EXTENDED RELEASE ORAL NIGHTLY
COMMUNITY

## 2024-06-10 RX ORDER — ALPRAZOLAM 0.5 MG/1
0.5 TABLET ORAL NIGHTLY PRN
Qty: 30 TABLET | Refills: 0 | Status: SHIPPED | OUTPATIENT
Start: 2024-06-10 | End: 2024-07-10

## 2024-06-11 ENCOUNTER — TELEPHONE (OUTPATIENT)
Dept: PRIMARY CARE CLINIC | Facility: CLINIC | Age: 89
End: 2024-06-11
Payer: MEDICARE

## 2024-06-11 DIAGNOSIS — R29.898 COGWHEEL RIGIDITY: Primary | ICD-10-CM

## 2024-06-25 ENCOUNTER — PATIENT MESSAGE (OUTPATIENT)
Dept: PALLIATIVE MEDICINE | Facility: CLINIC | Age: 89
End: 2024-06-25
Payer: MEDICARE

## 2024-06-26 ENCOUNTER — HOSPITAL ENCOUNTER (OUTPATIENT)
Dept: RADIOLOGY | Facility: HOSPITAL | Age: 89
Discharge: HOME | End: 2024-06-26
Attending: STUDENT IN AN ORGANIZED HEALTH CARE EDUCATION/TRAINING PROGRAM
Payer: MEDICARE

## 2024-06-26 ENCOUNTER — OFFICE VISIT (OUTPATIENT)
Dept: SPORTS MEDICINE | Facility: CLINIC | Age: 89
End: 2024-06-26
Payer: MEDICARE

## 2024-06-26 ENCOUNTER — RESEARCH ENCOUNTER (OUTPATIENT)
Dept: SPORTS MEDICINE | Facility: CLINIC | Age: 89
End: 2024-06-26

## 2024-06-26 VITALS — BODY MASS INDEX: 30.22 KG/M2 | RESPIRATION RATE: 17 BRPM | WEIGHT: 177 LBS | HEIGHT: 64 IN

## 2024-06-26 DIAGNOSIS — M19.012 BILATERAL SHOULDER REGION ARTHRITIS: ICD-10-CM

## 2024-06-26 DIAGNOSIS — M54.2 NECK PAIN: ICD-10-CM

## 2024-06-26 DIAGNOSIS — M79.89 MASS OF SOFT TISSUE OF PELVIS: ICD-10-CM

## 2024-06-26 DIAGNOSIS — M19.011 BILATERAL SHOULDER REGION ARTHRITIS: ICD-10-CM

## 2024-06-26 DIAGNOSIS — M19.012 BILATERAL SHOULDER REGION ARTHRITIS: Primary | ICD-10-CM

## 2024-06-26 DIAGNOSIS — M19.011 BILATERAL SHOULDER REGION ARTHRITIS: Primary | ICD-10-CM

## 2024-06-26 PROCEDURE — 1101F PT FALLS ASSESS-DOCD LE1/YR: CPT | Mod: CPTII,S$GLB,, | Performed by: STUDENT IN AN ORGANIZED HEALTH CARE EDUCATION/TRAINING PROGRAM

## 2024-06-26 PROCEDURE — 72190 X-RAY EXAM OF PELVIS: CPT | Mod: TC,HCNC

## 2024-06-26 PROCEDURE — 1160F RVW MEDS BY RX/DR IN RCRD: CPT | Mod: CPTII,S$GLB,, | Performed by: STUDENT IN AN ORGANIZED HEALTH CARE EDUCATION/TRAINING PROGRAM

## 2024-06-26 PROCEDURE — 3288F FALL RISK ASSESSMENT DOCD: CPT | Mod: CPTII,S$GLB,, | Performed by: STUDENT IN AN ORGANIZED HEALTH CARE EDUCATION/TRAINING PROGRAM

## 2024-06-26 PROCEDURE — 1125F AMNT PAIN NOTED PAIN PRSNT: CPT | Mod: CPTII,S$GLB,, | Performed by: STUDENT IN AN ORGANIZED HEALTH CARE EDUCATION/TRAINING PROGRAM

## 2024-06-26 PROCEDURE — 99204 OFFICE O/P NEW MOD 45 MIN: CPT | Mod: S$GLB,,, | Performed by: STUDENT IN AN ORGANIZED HEALTH CARE EDUCATION/TRAINING PROGRAM

## 2024-06-26 PROCEDURE — 1159F MED LIST DOCD IN RCRD: CPT | Mod: CPTII,S$GLB,, | Performed by: STUDENT IN AN ORGANIZED HEALTH CARE EDUCATION/TRAINING PROGRAM

## 2024-06-26 PROCEDURE — 72190 X-RAY EXAM OF PELVIS: CPT | Mod: 26,,, | Performed by: RADIOLOGY

## 2024-06-26 PROCEDURE — 72040 X-RAY EXAM NECK SPINE 2-3 VW: CPT | Mod: 26,HCNC,, | Performed by: RADIOLOGY

## 2024-06-26 PROCEDURE — 73030 X-RAY EXAM OF SHOULDER: CPT | Mod: TC,50,HCNC

## 2024-06-26 PROCEDURE — 73030 X-RAY EXAM OF SHOULDER: CPT | Mod: 26,50,HCNC, | Performed by: RADIOLOGY

## 2024-06-26 PROCEDURE — 99999 PR PBB SHADOW E&M-EST. PATIENT-LVL V: CPT | Mod: PBBFAC,,, | Performed by: STUDENT IN AN ORGANIZED HEALTH CARE EDUCATION/TRAINING PROGRAM

## 2024-06-26 PROCEDURE — 72040 X-RAY EXAM NECK SPINE 2-3 VW: CPT | Mod: TC,HCNC

## 2024-06-26 NOTE — PATIENT INSTRUCTIONS
Treatment options:    -maintain healthy weight (every pound is 4 pounds of pressure on the knee)    -daily moderate exercise (walk, bike, swim 30 minutes per day) to keep joints moving    -daily strengthening exercises (through therapy or on own) to keep muscles supporting joint healthy and strong    -glucosamine 1500mg daily (look for USP label on bottle)    -tylenol as needed for pain (follow directions on the bottle)    -anti-inflammatory medication such as alleve may be helpful- take 1-2 tabs twice daily for 7 days. If it helps your pain, continue. If you do not feel any change, you may stop and then take it as needed.    (you may be given a once daily anti-inflammatory such as MOBIC. If given, avoid other anti-inflammatory medications such as advil, ibuprofen, motrin, naprosyn, alleve, etc)    -if swollen and painful, ice, decrease activity, and take anti-inflammatory daily for 5-7 days and if no relief call your doctor for further options    -consider cortisone injection (every 3-4 months at most)- anti- inflammatory steroid medication that can be injected directly into the joint to reduce inflammation    -consider hyaluronic acid injections (eufflexxa, hyalgan, synvisc, supartz) (every 6 months at most)- protein injection that helps decrease pain and irritability in the joint. It is best used to help prolong intervals between cortisone injections to minimize steroid injections. These are currently approved for knee injections. Discuss with your doctor if other joint involved. Call to seek approval prior to the injections.    -long-term treatment may include a total joint replacement (keep diary of good days and bad days, then evaluate as to when you are ready)

## 2024-06-26 NOTE — PROGRESS NOTES
"Orthopaedics Sports Medicine     Shoulder Initial Visit         6/26/2024    Referring MD: Phylicia Deleon MD    Chief Complaint   Patient presents with    Right Shoulder - Pain    Left Shoulder - Pain         History of Present Illness:   Shirley Metz is a 89 y.o. right-hand dominant female who presents with left and right shoulder pain and dysfunction. She also notes issues with bilateral wrist pain. Has been seen by Dr. Fish by this in the past. No significant issues with wrists at this time. Lastly she reports pain and swelling to the left ischium.     Onset of the symptoms was 3 months ago.      Inciting event: No specific injury or trauma, gradual onset and worsening over time.      Current symptoms include bilateral shoulder pain, right greater than left. She also reports pain to neck and upper traps.  She has a history of left reverse TSA. No significant issues at this time. Right shoulder is worse and reports it feels similar to the way the left shoulder did before surgery. She reports some pain down the right arm. Thinks the pain is worsened by having to weight bear through her arm to support her when using her walker.       Pain is aggravated by certain movements of shoulder, and weight bearing through arms on walker.       Evaluation to date: X-Ray     Treatment to date: Rest, activity modification. Was seen by PCP towards the end of May and ultimately referred to orthopedics. States she was also referred for physical therapy but wanted to wait and make sure it was safe for her to perform.  Has used some topical ointments that have provided her with some relief. Unable to take NSAIDs due to chronic kidney disease.     Patient has a history of left rTSA. She is allergic to cortisone and steroids.     Patient also reports "lump" on posterior pelvis. She is trying to find the correct provider to see her for this issue.     Past Medical History:   Past Medical History:   Diagnosis Date    " Acute on chronic diastolic congestive heart failure 01/11/2023    Anemia 11/29/2018    Anxiety 11/28/2017    Arthritis     Atrial fibrillation with RVR 10/09/2019    Back pain     Basal cell carcinoma 03/29/2018    left mid back    Chronic diastolic congestive heart failure 10/15/2019    CKD (chronic kidney disease) stage 3, GFR 30-59 ml/min 08/08/2016    Colon polyps     reported per patient.     Coronary artery calcification 10/05/2021    Elevated liver enzymes 12/20/2019    Elevated troponin 03/15/2018    Fuchs' corneal dystrophy     General anesthetics causing adverse effect in therapeutic use     woke up during surgery and gagged on ET tube    Glaucoma     Glaucoma     Heart failure with preserved left ventricular function (HFpEF) 07/24/2018    Hyperlipidemia     Hypertension     Macular degeneration dry    Obesity     Pre-diabetes 12/04/2017    PVD (peripheral vascular disease) 04/26/2021    PVD (peripheral vascular disease) 04/26/2021    Squamous cell carcinoma 01/08/2019    left shoulder    Stenosis of carotid artery 10/05/2021    Troponin level elevated 01/11/2023    Trouble in sleeping     Type 2 diabetes mellitus without complication, without long-term current use of insulin 02/24/2021    Urinary tract infection        Past Surgical History:   Past Surgical History:   Procedure Laterality Date    ABLATION OF ARRHYTHMOGENIC FOCUS FOR ATRIAL FIBRILLATION N/A 11/03/2022    Procedure: Ablation atrial fibrillation;  Surgeon: Toi Kelly MD;  Location: [facility];  Service: Cardiology;  Laterality: N/A;  afib, PVI/CTI, RFA, STACEY (cx if SR), DEDE, anes, MB, 3 Prep    ADENOIDECTOMY  1938    BREAST BIOPSY Left     1997. benign    BREAST CYST EXCISION Left 1997 approx    CARPAL TUNNEL RELEASE Right 2012 approx    CATARACT EXTRACTION Bilateral 1994    CHOLECYSTECTOMY  1975    CORNEAL TRANSPLANT Bilateral 08/2015 8/2015 - left; Right 4/2016    EYE SURGERY  12/06/2023    Fuch's Corneal dystrophy - had 2nd  operation with Dr. Quintanilla    EYE SURGERY      Cataract wIOL    left thumb Left 2011    removed cuboid for arthritis    REVERSE TOTAL SHOULDER ARTHROPLASTY Left 08/21/2018    Procedure: ARTHROPLASTY, SHOULDER, TOTAL, REVERSE;  Surgeon: Solomon Lujan MD;  Location: [facility];  Service: Orthopedics;  Laterality: Left;  WITH BICEP TENODESIS    SKIN CANCER EXCISION Left 2017    BCC removal by Dr. Valadez    SLT- OS (aka TRABECULOPLASTY) Left 05/11/2011    repeat 3/14/12    TENOTOMY Left 08/21/2018    Procedure: TENOTOMY;  Surgeon: Solomon Lujan MD;  Location: [facility] OR;  Service: Orthopedics;  Laterality: Left;    TONSILLECTOMY  1938    TRANSESOPHAGEAL ECHOCARDIOGRAM WITH POSSIBLE CARDIOVERSION (STACEY W/ POSS CARDIOVERSION) N/A 03/21/2023    Procedure: Transesophageal echo (STACEY) intra-procedure log documentation;  Surgeon: Trevon Ramirez MD;  Location: [facility] CATH LAB;  Service: Cardiology;  Laterality: N/A;    TREATMENT OF CARDIAC ARRHYTHMIA N/A 10/10/2019    Procedure: CARDIOVERSION;  Surgeon: Pete Durán MD;  Location: [facility] CATH LAB;  Service: Cardiology;  Laterality: N/A;    TREATMENT OF CARDIAC ARRHYTHMIA N/A 12/06/2019    Procedure: CARDIOVERSION;  Surgeon: Samy Castillo MD;  Location: [facility] CATH LAB;  Service: Cardiology;  Laterality: N/A;       Medications:  Patient's Medications   New Prescriptions    No medications on file   Previous Medications    ACETAMINOPHEN (TYLENOL) 500 MG TABLET    Take 500 mg by mouth nightly as needed.    ALPRAZOLAM (XANAX XR) 0.5 MG TB24    Take 0.5 mg by mouth nightly.    ALPRAZOLAM (XANAX) 0.5 MG TABLET    TAKE 1 TABLET (0.5 MG TOTAL) BY MOUTH NIGHTLY AS NEEDED FOR ANXIETY.    B COMPLEX VITAMINS CAPSULE    Take 1 capsule by mouth once daily.    CALCIUM CARBONATE/VITAMIN D3 (VITAMIN D-3 ORAL)    Take 4,000 Int'l Units/day by mouth once daily.    ELIQUIS 2.5 MG TAB    TAKE 1 TABLET (2.5 MG TOTAL) BY MOUTH 2 (TWO) TIMES DAILY.    FUROSEMIDE (LASIX) 40 MG TABLET    TAKE 1 TABLET (40 MG  TOTAL) BY MOUTH 2 TIMES A DAY.    LATANOPROSTENE BUNOD (VYZULTA) 0.024 % DROP    Place 1 drop into the left eye every evening.    METOPROLOL TARTRATE (LOPRESSOR) 25 MG TABLET    Take 2 tablets (50 mg total) by mouth 2 (two) times daily.   Modified Medications    No medications on file   Discontinued Medications    No medications on file       Allergies:   Review of patient's allergies indicates:   Allergen Reactions    Carvedilol Swelling     Lip swelling    Cephalexin Other (See Comments)     Muscle/joint weakness unable to move     Doxycycline Shortness Of Breath, Nausea And Vomiting and Other (See Comments)     weakness    Erythromycin Other (See Comments)     Complete weakness/could not walk    Gadolinium-containing contrast media Swelling     angioedema    Hydrocodone-acetaminophen Shortness Of Breath     wheezing    Labetalol Shortness Of Breath, Nausea Only, Palpitations and Other (See Comments)     weakness    Loratadine Other (See Comments)     Double vision/spots  Other reaction(s): double vision    Losartan Other (See Comments)     weakness    Lotemax [loteprednol etabonate] Palpitations and Other (See Comments)     Patient stated bp went up and extreme muscle weakness    Naproxen      wheezing  wheezing  wheezing  Other reaction(s): wheezing    Statins-hmg-coa reductase inhibitors Other (See Comments)     Severe Muscle weakness  Muscle weakness  Severe Muscle weakness  Other reaction(s): severe muscle weakness    Amlodipine Other (See Comments)     Myalgias    Fenofibrate Other (See Comments)     muscle weakness      Hydralazine Other (See Comments)     muscle tremors    Hydralazine analogues Other (See Comments)     Muscle tremors/aches      Loteprednol Other (See Comments)     increased BP    Macrolide antibiotics Other (See Comments)     Complete weakness/could not walk      Moxifloxacin Hives and Other (See Comments)     muscle soreness    Timolol Other (See Comments)     Blurred vision, Burning  "eyes, Severe muscle weakness, eye problems.      Verapamil Diarrhea     Severe diarrhea.      Hydrocortisone     Isosorbide      Tolerates Imdur    Tetanus and diphtheria toxoids     Adhesive Rash     Clonidine patch--contact dermatitis    Codeine Diarrhea and Other (See Comments)     Loss of balance       Doxazosin Palpitations    Fexofenadine Other (See Comments)     Double vision/spots       Hydrocortisone (bulk) Other (See Comments)     Only suppository/ caused muscle weakness    Latanoprost Other (See Comments)     Muscle weakness      Olopatadine Other (See Comments)     Muscle weakness      Prednisone Anxiety       Social History:   Home town: [place], [place]  Alcohol use: She reports current alcohol use of about 2.0 standard drinks of alcohol per week.  Tobacco use: She reports that she has never smoked. She has never used smokeless tobacco.    Review of systems:  History of recent illness, fevers, shakes, or chills: no  History of cardiac problems or chest pain: Yes  History of pulmonary problems or asthma: no  History of diabetes: no  History of prior dvt or clotting problems: no  History of sleep apnea: no      Physical Examination:  Estimated body mass index is 30.39 kg/m² as calculated from the following:    Height as of this encounter: 5' 4" (1.626 m).    Weight as of this encounter: 80.3 kg (177 lb 0.5 oz).    General  Healthy appearing female in no acute distress  Alert and oriented, normal mood, appropriate affect    Shoulder Examination:  Patient is alert and oriented, no distress. Skin is intact. Neuro is normal with no focal motor or sensory findings.    Cervical exam is unremarkable. Intact cervical ROM. Negative Spurling's test    Physical Exam:  RIGHT    LEFT    Scap Dyskinesis/Winging (-)    (-)    Tenderness:          Greater Tuberosity             (-)    (-)  Bicipital Groove  +    (-)  AC joint   (-)    (-)  Other:     ROM:  Forward Elevation 120    120  Abduction  80    70      Strength: "   Supraspinatus  4/5      Infraspinatus  5/5      Subscap / IR  5/5        Special Tests:   Neer:    +       Moon:   +       SS Stress:   +       Bear Hug:   (-)       Midland's:   (-)       Resisted Thrower's:   (-)       Cross Arm Abduction:  (-)        Neurovascular examination  - Motor grossly intact bilaterally to shoulder abduction, elbow flexion and extension, wrist flexion and extension, and intrinsic hand musculature  - Sensation intact to light touch bilaterally in axillary, median, radial, and ulnar distributions  - Symmetrical radial pulses    Posterior Hip  No significant mass palpated on ischium, tenderness to palpation over ischium, no overlying skin changes      Imaging:  X-Ray Pelvis Complete min 3 views  Narrative: EXAM:  XR PELVIS COMPLETE MIN 3 VIEWS    CLINICAL HISTORY: Pelvic pain    FINDINGS: No fracture, dislocation or other acute abnormality seen in the pelvis.  Scattered mild to moderate degenerative changes lower lumbar spine and bilateral hip joints.  Impression:  No acute disease is seen in the pelvis.  Chronic findings as detailed above.    Finalized on: 6/26/2024 12:57 PM By:  Darell Calderon MD  [facility]# [id number]      2024-06-26 12:59:51.581    [facility]  X-Ray Cervical Spine AP And Lateral  EXAM: XR CERVICAL SPINE AP LATERAL    CLINICAL HISTORY: [M54.2]-Cervicalgia. TechNotes: Attempted multiple open mouth views. Sent best image obtainable    FINDINGS:  3 views of the cervical spine.    Moderate to severe multilevel degenerative disc disease most pronounced involving the C5-T1 levels.  Alignment is maintained.  Odontoid is not well profiled.  Significant degenerative changes involving the facets.  No acute fracture.    IMPRESSION:  Multilevel degenerative changes.    Finalized on: 6/26/2024 12:15 PM By:  Danial Bar MD  [facility]# [id number]      2024-06-26 12:17:29.039    [facility]  X-Ray Shoulder 2 or more views Bilat  EXAM: XR SHOULDER COMPLETE 2 OR MORE VIEWS BILATERAL    CLINICAL HISTORY:  [M19.011]-Primary osteoarthritis, right shoulder./[M19.012]-Primary osteoarthritis, left shoulder.    FINDINGS:  2 views each shoulder.  Left shoulder: Reverse shoulder replacement without evidence of hardware complication.  Right shoulder: Moderate to severe glenohumeral degenerative changes with joint space loss.  No acute fracture or dislocation.    IMPRESSION:  As above.    Finalized on: 6/26/2024 12:12 PM By:  Danial Bar MD  [facility]# [id number]      2024-06-26 12:14:38.848    [facility]        MRI Results:  No results found for this or any previous visit.      CT Results:  No results found for this or any previous visit.      Physician Read: I agree with the above impression.      Impression:  89 y.o. female with right shoulder glenohumeral osteoarthritis, history of left reverse total shoulder arthroplasty, left sided ischial pain/mass      Plan:  Discussed diagnosis and treatment options with patient today.  She has right shoulder glenohumeral arthritis and history of a left reverse total shoulder arthroplasty done 6 years ago.  Right shoulder is more painful.  We went over treatment options for arthritis including rest, activity modification, oral anti-inflammatories, topicals, corticosteroid injection, and joint replacement surgery.  She states that she has allergy to steroids and does not want to do anti-inflammatories due to kidney concerns.  She is currently using arnica and reports acceptable symptom relief with this.  She does not want to proceed with any kind of shoulder replacement.  We will continue with her arnica topical for right shoulder pain.  She also inquired about over-the-counter cartilage supplements.  I made recommendation for chondroitin sulfate.  Discussed it is safe for her to participate in physical therapy and referral was placed today.   For her left ischial pain and mass, I reassured her I do not see any type of bony lesion there.  Because she endorses pain and swelling to this location,  we will move forward with advanced imaging.  MRI ordered for pelvis to evaluate left ischial mass.  Follow up with me after MRI.           Wilbert Worthington MD    I, Dilan Natarajan, acted as a scribe for Wilbert Worthington MD for the duration of this office visit.

## 2024-07-01 ENCOUNTER — PATIENT MESSAGE (OUTPATIENT)
Dept: PALLIATIVE MEDICINE | Facility: CLINIC | Age: 89
End: 2024-07-01
Payer: MEDICARE

## 2024-07-01 ENCOUNTER — OFFICE VISIT (OUTPATIENT)
Dept: CARDIOLOGY | Facility: CLINIC | Age: 89
End: 2024-07-01
Payer: MEDICARE

## 2024-07-01 VITALS
DIASTOLIC BLOOD PRESSURE: 62 MMHG | HEIGHT: 64 IN | WEIGHT: 178.69 LBS | SYSTOLIC BLOOD PRESSURE: 106 MMHG | OXYGEN SATURATION: 95 % | BODY MASS INDEX: 30.51 KG/M2 | HEART RATE: 111 BPM

## 2024-07-01 DIAGNOSIS — I65.23 CAROTID ARTERY CALCIFICATION, BILATERAL: ICD-10-CM

## 2024-07-01 DIAGNOSIS — I12.9 CKD STAGE 4 SECONDARY TO HYPERTENSION: Chronic | ICD-10-CM

## 2024-07-01 DIAGNOSIS — E78.2 MIXED HYPERLIPIDEMIA: ICD-10-CM

## 2024-07-01 DIAGNOSIS — Z78.9 STATIN INTOLERANCE: ICD-10-CM

## 2024-07-01 DIAGNOSIS — I48.19 PERSISTENT ATRIAL FIBRILLATION: Chronic | ICD-10-CM

## 2024-07-01 DIAGNOSIS — I48.92 ATRIAL FLUTTER WITH RAPID VENTRICULAR RESPONSE: Primary | ICD-10-CM

## 2024-07-01 DIAGNOSIS — N18.4 CKD STAGE 4 SECONDARY TO HYPERTENSION: Chronic | ICD-10-CM

## 2024-07-01 DIAGNOSIS — E66.9 OBESITY (BMI 30.0-34.9): ICD-10-CM

## 2024-07-01 DIAGNOSIS — I13.2 HYPERTENSIVE HEART AND RENAL DISEASE WITH BOTH (CONGESTIVE) HEART FAILURE AND RENAL FAILURE: Chronic | ICD-10-CM

## 2024-07-01 DIAGNOSIS — I70.0 CALCIFICATION OF AORTA: Chronic | ICD-10-CM

## 2024-07-01 DIAGNOSIS — N19 HYPERTENSIVE HEART AND RENAL DISEASE WITH BOTH (CONGESTIVE) HEART FAILURE AND RENAL FAILURE: Chronic | ICD-10-CM

## 2024-07-01 DIAGNOSIS — I48.91 ATRIAL FIBRILLATION WITH RVR: Chronic | ICD-10-CM

## 2024-07-01 DIAGNOSIS — I10 PRIMARY HYPERTENSION: Chronic | ICD-10-CM

## 2024-07-01 DIAGNOSIS — R94.31 ABNORMAL ECG: ICD-10-CM

## 2024-07-01 PROCEDURE — 3288F FALL RISK ASSESSMENT DOCD: CPT | Mod: HCNC,CPTII,S$GLB, | Performed by: PHYSICIAN ASSISTANT

## 2024-07-01 PROCEDURE — 99214 OFFICE O/P EST MOD 30 MIN: CPT | Mod: HCNC,S$GLB,, | Performed by: PHYSICIAN ASSISTANT

## 2024-07-01 PROCEDURE — 1159F MED LIST DOCD IN RCRD: CPT | Mod: HCNC,CPTII,S$GLB, | Performed by: PHYSICIAN ASSISTANT

## 2024-07-01 PROCEDURE — 99999 PR PBB SHADOW E&M-EST. PATIENT-LVL III: CPT | Mod: PBBFAC,HCNC,, | Performed by: PHYSICIAN ASSISTANT

## 2024-07-01 PROCEDURE — 1101F PT FALLS ASSESS-DOCD LE1/YR: CPT | Mod: HCNC,CPTII,S$GLB, | Performed by: PHYSICIAN ASSISTANT

## 2024-07-05 ENCOUNTER — OFFICE VISIT (OUTPATIENT)
Dept: OPHTHALMOLOGY | Facility: CLINIC | Age: 89
End: 2024-07-05
Payer: MEDICARE

## 2024-07-05 DIAGNOSIS — Z96.1 PSEUDOPHAKIA OF BOTH EYES: ICD-10-CM

## 2024-07-05 DIAGNOSIS — H16.143 PUNCTATE KERATITIS OF BOTH EYES: Primary | ICD-10-CM

## 2024-07-05 DIAGNOSIS — H40.1131 PRIMARY OPEN ANGLE GLAUCOMA (POAG) OF BOTH EYES, MILD STAGE: ICD-10-CM

## 2024-07-05 DIAGNOSIS — Z94.7 HISTORY OF CORNEA TRANSPLANT: ICD-10-CM

## 2024-07-05 PROCEDURE — 99999 PR PBB SHADOW E&M-EST. PATIENT-LVL II: CPT | Mod: PBBFAC,HCNC,, | Performed by: OPHTHALMOLOGY

## 2024-07-05 NOTE — PROGRESS NOTES
HPI     Follow-up     Additional comments: Prescription recheck    Pt co blurred va OS>OD when reading  Not sure if reading glasses are correct  Pt using vyzulta QHS OS            Comments    1. Mild COAG Goal < 19   SLT OD 3/04 (20 to 15) + 3/11 (19 to 15)   SLT OS 5/05 (20 to 14) +5/11 (18-19 to 15) + 3/12 (min resp.) + 3/11/15   (20.5-17.5) + 3/7/18 (24- 21) + 11/3/21 (22.5-17.5)  (Cosopt, Xalatan, and Timolol cause Myalgias)   (Alphagan causes redness) (zioptan too expensive)   Possible steroid responder   Xen Gel OS 12/06/23  Ahmed Valve OS with Dr. Gomez 2/13/2024  2. PCIOL OU   3. Mild Dry AMD   4. Fuch's Dystrophy   DSAEK OD 4/16 Dr. Quintanilla (followed by [facility] every summer)   DSAEK OS 8/15 Dr. Quintanilla   Rx Inveltys (1% lotemax) (IOP 15/23) 5/29/20   Dr. Quintanilla Ok'd to stop Lotemax OU  5. Dry Eye -intolerance to Restasis   6. Recent A-Fib. (from Labetolol ?)     >>>>Lotemax BID OS - Allergic, very weak, pain throughout body    Systane Ultra 4-5 tiimes daily   Travatan Z qhs os Discontinue  Rhopressa qhs OS Discontinue  Azopt qam OS Discontinue  Timolol BID OS Discontinue    OS: Vyzulta QHS  OS: Brimonidine BID- discontinue    +HAG             Last edited by Angel Bullock on 7/5/2024  9:07 AM.            Assessment /Plan     For exam results, see Encounter Report.    Punctate keratitis of both eyes  Patient currently using Systane ~5 times daily. Intolerance to Restasis and topical steroids. Start Genteal gel qHS for dryness and blurriness OS. Discussed with patient that while dryness can contribute to blurry vision, poor vision OS due to glaucoma and history of DSAEK.    Primary open angle glaucoma (POAG) of both eyes, mild stage  Doing well, intraocular pressure (IOP) within acceptable range relative to target IOP with no evidence of progression. Continue current treatment. Reviewed importance of continued compliance with treatment and follow up.      Continue current gtts:  Vyzulta one drop in left  eye nightly    History of cornea transplant  See above.    Pseudophakia of both eyes  Stable. Cont current Mrx.

## 2024-07-10 ENCOUNTER — TELEPHONE (OUTPATIENT)
Dept: SPORTS MEDICINE | Facility: CLINIC | Age: 89
End: 2024-07-10
Payer: MEDICARE

## 2024-07-10 ENCOUNTER — PATIENT MESSAGE (OUTPATIENT)
Dept: SPORTS MEDICINE | Facility: CLINIC | Age: 89
End: 2024-07-10
Payer: MEDICARE

## 2024-07-10 DIAGNOSIS — Z96.612 HISTORY OF TOTAL REPLACEMENT OF LEFT SHOULDER JOINT: ICD-10-CM

## 2024-07-10 DIAGNOSIS — F41.9 ANXIETY: ICD-10-CM

## 2024-07-10 DIAGNOSIS — M19.012 BILATERAL SHOULDER REGION ARTHRITIS: Primary | ICD-10-CM

## 2024-07-10 DIAGNOSIS — R19.07 GENERALIZED INTRA-ABDOMINAL AND PELVIC SWELLING, MASS AND LUMP: ICD-10-CM

## 2024-07-10 DIAGNOSIS — M19.011 BILATERAL SHOULDER REGION ARTHRITIS: Primary | ICD-10-CM

## 2024-07-10 NOTE — TELEPHONE ENCOUNTER
Reached out to pt regarding her message sent about PT and Open MRI, I have advised patient that we will get everything sent over to them Shortly       ----- Message from Bonita Armas sent at 7/10/2024  7:59 AM CDT -----  Regarding: Medical Advice  Contact: Shirley  Type:  Needs Medical Advice    Who Called: Shirley   Symptoms (please be specific):    How long has patient had these symptoms:    Pharmacy name and phone #:    Would the patient rather a call back or a response via [facility]?call   Best Call Back Number: [phone]  Additional Information: Shirley says two weeks ago Dr. Worthington recommended PT but she has not heard back. The open MRI is not offered at one facility and she would like to speak to the nurse today about another facility.

## 2024-07-11 PROCEDURE — G0180 MD CERTIFICATION HHA PATIENT: HCPCS | Mod: ,,, | Performed by: STUDENT IN AN ORGANIZED HEALTH CARE EDUCATION/TRAINING PROGRAM

## 2024-07-11 RX ORDER — ALPRAZOLAM 0.5 MG/1
0.5 TABLET ORAL NIGHTLY PRN
Qty: 30 TABLET | Refills: 0 | Status: SHIPPED | OUTPATIENT
Start: 2024-07-11 | End: 2024-08-10

## 2024-07-15 ENCOUNTER — PATIENT MESSAGE (OUTPATIENT)
Dept: OPHTHALMOLOGY | Facility: CLINIC | Age: 89
End: 2024-07-15
Payer: MEDICARE

## 2024-07-16 ENCOUNTER — TELEPHONE (OUTPATIENT)
Dept: PRIMARY CARE CLINIC | Facility: CLINIC | Age: 89
End: 2024-07-16
Payer: MEDICARE

## 2024-07-16 ENCOUNTER — TELEPHONE (OUTPATIENT)
Dept: HEMATOLOGY/ONCOLOGY | Facility: CLINIC | Age: 89
End: 2024-07-16
Payer: MEDICARE

## 2024-07-16 NOTE — TELEPHONE ENCOUNTER
----- Message from Cheryl Mccormick sent at 7/11/2024  3:13 PM CDT -----  Contact: Shirley Watson is needing a call back regarding getting a Covid Vaccine and she needs some information. Please call back at [phone] ([phone])  [phone].    Thank you summer

## 2024-07-16 NOTE — TELEPHONE ENCOUNTER
Returned call to pt @[phone] re: COVID vaccine, states [facility] and [facility] informed her that she was unable to receive the Comirnaty vaccine. Pt called to ask if O65+ had any insight regarding why [facility] and [facility] communicated that she was unable to receive. Pt also shared that she had spoken with [facility] pharmacy and was notified the drug was available but wanted more information before scheduling. Phoned [facility] pharmacy, pharmacist stated that some pharmacies are holding off administering Comirnaty vaccine until 09.2024. As per pt's record, she has received the Pfizer vaccine in the past. Informed pt that Pfizer, Moderna and Comirnaty are available. Pt. Will call [facility] tomorrow and speak with pharmacist.

## 2024-07-25 ENCOUNTER — PATIENT MESSAGE (OUTPATIENT)
Dept: SPORTS MEDICINE | Facility: CLINIC | Age: 89
End: 2024-07-25
Payer: MEDICARE

## 2024-07-30 NOTE — PROGRESS NOTES
"Orthopaedic Follow-Up Visit    Last Appointment: 6/26/24  Diagnosis: Left sided ischial pain/mass   Prior Procedure: MRI    Shirley Metz is a 89 y.o. female who is here for f/u evaluation of her left hip. The patient was last seen here by me on 6/26/24 at which point she reported left sided ischial pain and mass. I reassured her I did not see any type of bony lesion there. Because she endorsed pain and swelling to this location, we elected move forward with advanced imaging in the form of MRI. The patient returns today to review her MRI. She reports she continues to have some pain when sitting. No changes in skin appearance or redness or warmth to the area. Does report occasional change in size of area of concern.     To review her history, Shirley Metz is a 89 y.o. female who reported "lump" on posterior pelvis. She was trying to find the correct provider to see her for this issue.     Patient's medications, allergies, past medical, surgical, social and family histories were reviewed and updated as appropriate.    Review of Systems   All systems reviewed were negative.  Specifically, the patient denies fever, chills, weight loss, chest pain, shortness of breath, or dyspnea on exertion.      Past Medical History:   Diagnosis Date    Acute on chronic diastolic congestive heart failure 01/11/2023    Anemia 11/29/2018    Anxiety 11/28/2017    Arthritis     Atrial fibrillation with RVR 10/09/2019    Back pain     Basal cell carcinoma 03/29/2018    left mid back    Chronic diastolic congestive heart failure 10/15/2019    CKD (chronic kidney disease) stage 3, GFR 30-59 ml/min 08/08/2016    Colon polyps     reported per patient.     Coronary artery calcification 10/05/2021    Elevated liver enzymes 12/20/2019    Elevated troponin 03/15/2018    Fuchs' corneal dystrophy     General anesthetics causing adverse effect in therapeutic use     woke up during surgery and gagged on ET tube    Glaucoma     " Glaucoma     Heart failure with preserved left ventricular function (HFpEF) 07/24/2018    Hyperlipidemia     Hypertension     Macular degeneration dry    Obesity     Pre-diabetes 12/04/2017    PVD (peripheral vascular disease) 04/26/2021    PVD (peripheral vascular disease) 04/26/2021    Squamous cell carcinoma 01/08/2019    left shoulder    Stenosis of carotid artery 10/05/2021    Troponin level elevated 01/11/2023    Trouble in sleeping     Type 2 diabetes mellitus without complication, without long-term current use of insulin 02/24/2021    Urinary tract infection        Objective:      Physical Exam  Patient is alert and oriented, no distress. Skin is intact. Neuro is normal with no focal motor or sensory findings.    Posterior Hip  No significant mass palpated on ischium, tenderness to palpation over ischium, no overlying skin changes   No change in symptoms with resisted hamstring testing.     Neurovascular exam  - motor function grossly intact bilaterally to hip flexion, knee extension and flexion, ankle dorsiflexion and plantarflexion  - sensation intact to light touch bilaterally to femoral, tibial, tibial and peroneal distributions  - symmetrical pedal pulses    Imaging:     XR Results:   XR PELVIS COMPLETE MIN 3 VIEWS     CLINICAL HISTORY: Pelvic pain     FINDINGS: No fracture, dislocation or other acute abnormality seen in the pelvis.  Scattered mild to moderate degenerative changes lower lumbar spine and bilateral hip joints.        Impression:   No acute disease is seen in the pelvis.  Chronic findings as detailed above.     Finalized on: 6/26/2024 12:57 PM By:  Darell Calderon MD  [facility]# [id number]      2024-06-26 12:59:[id number]    [facility]      MRI Results:    MRI, PELVIS, SACRUM OR SACROILIAC S/ CONTRAST    ORDERING PHYSICIAN: YEHUDA SOARES     REASON FOR EXAM: , left-sided buttock pain with palpable mass times 4-5 months.  (Area marked with skin marker)     COMPARISON: None.     TECHNIQUE: Axial and  sagittal T1 and T2 weighted images of the spine were obtained.  No gadolinium was administered.     FINDINGS: The bony structures of the pelvis are intact.  There is asymmetric subcutaneous soft tissue signal in the left buttock extending D into the pelvis near the posterior ischial tuberosity extending along the posterior aspect of the left hip joint.  No joint fluid or joint involvement is noted.  Inflammatory or infectious cellulitis is suspected. No definite mass or focal fluid collection is seen. The SI joints are intact.     IMPRESSION: Abnormal, asymmetric soft tissue signal in the deep soft tissues of the posterior left hemipelvis.  Inflammatory/infectious etiology is suspected.  No gross bone or left hip joint involvement is noted.  No focal mass or fluid collection is noted.     Dictated By:  Philip Swift M.D., Radiologist   Electronically signed By: Philip Swift M.D., Radiologist on 07/24/2024 at 02:38 PM       Physician Read: I agree with the above impression.    Assessment/Plan:   Assessment:  Shirley Metz is a 89 y.o. female with left sided ischial pain, no discrete soft tissue mass    Plan:    Reviewed MRI with the patient today. Her MRI shows an area of soft tissue inflammation, but no discrete soft tissue mass.  It appears to be more of a hot spot where there is increased pressure underneath her ischium.  There is some bilateral fluid signal around her hamstring insertions, but tendons appear intact.  I reassured that I do not see any soft tissue mass and knees does the radiologist.  Recommend continuing with symptomatic management such as using a cushion for prolonged sitting as well as incorporating some core, hip, and buttock home exercises.  Follow-up with me as needed.          Wilbert Worthington MD    I, Dilan Natarajan, acted as a scribe for Wilbert Worthington MD for the duration of this office visit.

## 2024-07-31 ENCOUNTER — OFFICE VISIT (OUTPATIENT)
Dept: PRIMARY CARE CLINIC | Facility: CLINIC | Age: 89
End: 2024-07-31
Payer: MEDICARE

## 2024-07-31 VITALS
OXYGEN SATURATION: 98 % | DIASTOLIC BLOOD PRESSURE: 62 MMHG | HEART RATE: 91 BPM | TEMPERATURE: 96 F | WEIGHT: 177.38 LBS | HEIGHT: 64 IN | SYSTOLIC BLOOD PRESSURE: 100 MMHG | BODY MASS INDEX: 30.28 KG/M2

## 2024-07-31 DIAGNOSIS — F41.9 ANXIETY: ICD-10-CM

## 2024-07-31 DIAGNOSIS — D75.89 MACROCYTOSIS WITHOUT ANEMIA: ICD-10-CM

## 2024-07-31 DIAGNOSIS — E11.22 TYPE 2 DIABETES MELLITUS WITH STAGE 4 CHRONIC KIDNEY DISEASE, WITHOUT LONG-TERM CURRENT USE OF INSULIN: ICD-10-CM

## 2024-07-31 DIAGNOSIS — N18.4 TYPE 2 DIABETES MELLITUS WITH STAGE 4 CHRONIC KIDNEY DISEASE, WITHOUT LONG-TERM CURRENT USE OF INSULIN: ICD-10-CM

## 2024-07-31 DIAGNOSIS — R54 FRAILTY SYNDROME IN GERIATRIC PATIENT: Chronic | ICD-10-CM

## 2024-07-31 DIAGNOSIS — Z78.9 STATIN INTOLERANCE: ICD-10-CM

## 2024-07-31 DIAGNOSIS — I48.91 ATRIAL FIBRILLATION WITH RVR: Primary | Chronic | ICD-10-CM

## 2024-07-31 DIAGNOSIS — R26.9 ABNORMALITY OF GAIT AND MOBILITY: ICD-10-CM

## 2024-07-31 PROCEDURE — 3288F FALL RISK ASSESSMENT DOCD: CPT | Mod: HCNC,CPTII,S$GLB, | Performed by: INTERNAL MEDICINE

## 2024-07-31 PROCEDURE — 1125F AMNT PAIN NOTED PAIN PRSNT: CPT | Mod: HCNC,CPTII,S$GLB, | Performed by: INTERNAL MEDICINE

## 2024-07-31 PROCEDURE — 99999 PR PBB SHADOW E&M-EST. PATIENT-LVL IV: CPT | Mod: PBBFAC,HCNC,, | Performed by: INTERNAL MEDICINE

## 2024-07-31 PROCEDURE — 1101F PT FALLS ASSESS-DOCD LE1/YR: CPT | Mod: HCNC,CPTII,S$GLB, | Performed by: INTERNAL MEDICINE

## 2024-07-31 PROCEDURE — 1160F RVW MEDS BY RX/DR IN RCRD: CPT | Mod: HCNC,CPTII,S$GLB, | Performed by: INTERNAL MEDICINE

## 2024-07-31 PROCEDURE — 1159F MED LIST DOCD IN RCRD: CPT | Mod: HCNC,CPTII,S$GLB, | Performed by: INTERNAL MEDICINE

## 2024-07-31 PROCEDURE — 99215 OFFICE O/P EST HI 40 MIN: CPT | Mod: HCNC,S$GLB,, | Performed by: INTERNAL MEDICINE

## 2024-07-31 RX ORDER — METOLAZONE 2.5 MG/1
2.5 TABLET ORAL WEEKLY
Qty: 12 TABLET | Refills: 3 | Status: SHIPPED | OUTPATIENT
Start: 2024-07-31 | End: 2025-07-31

## 2024-07-31 RX ORDER — ALPRAZOLAM 0.5 MG/1
0.5 TABLET ORAL NIGHTLY PRN
Qty: 30 TABLET | Refills: 2 | Status: SHIPPED | OUTPATIENT
Start: 2024-07-31 | End: 2024-10-29

## 2024-07-31 NOTE — PATIENT INSTRUCTIONS
If you are feeling unwell, we'd like to be the first ones to know [facility]! Please give us a call. Same day appointments are our top priority to keep you well and out of the emergency rooms and hospitals. Call [phone] for our direct line. After hours advice is always available. Please call [phone] after hours to speak to the on-call team.      Recommend bring MRI disc to Radiology @ [facility] or [facility] so they can upload to [employer]     F/u w Palliative care next week as scheduled to discuss whether or not to cont monitoring cholesterol, A1c etc

## 2024-07-31 NOTE — PROGRESS NOTES
"Shirley Metz  07/31/2024  [id number]    Phylicia Deleon MD  Patient Care Team:  Phylicia Deleon MD as PCP - General (Internal Medicine)  Wilbert Ware MD as Consulting Physician (Ophthalmology)  Rajinder Quintanilla MD as Consulting Physician (Ophthalmology)  Rosario Valadez MD as Consulting Physician (Dermatology)  Amish Mendoza MD (Pulmonary Disease)  Emmanuel Fish MD as Consulting Physician (Hand Surgery)  Phylicia Deleon MD as Physician (Internal Medicine)  Heather Miller NP as Nurse Practitioner (Palliative Medicine)  Jo-Ann Jacobo PA-C as Physician Assistant (Cardiology)  Chelsea Hughes FNP-C as Nurse Practitioner (Cardiology)  Wilbert Worthington MD as Consulting Physician (Orthopedic Surgery)    Visit Type: Follow-up    Chief Complaint:  Chief Complaint   Patient presents with    Follow-up     Pt states she is still having trouble with her eyes, has arthritis pain in arms     History of Present Illness: History of Present Illness: Ms. Edmond is a 89 year old female w multiple chronic medical issues including Diastolic CHF, HTN, atherosclerosis, CKD, Afib/aflutter w RVR on Eliquis, obesity, pre-diabetes, osteopenia, and OA.    [facility] PT 2x week on wk 3 of 6   Palliative Care f/u next week also     Was FINALLY able to get CoVid vaccine at [employer]    Continued eye problems - persistent double vision  For near vision glasses trouble reading from L eye but can read from R eye    Ophthal: Jeanie previous glaucoma specialist - did glaucoma "stent" surgery - next appt Oct  Small - lens specialist - f/u next week  Jason - valve specialist - did "valve" surgery - will be seeing Dr. Wells instead for this also in Oct    C/o B neck shoulder arm pain - hands "stable"    MRI Pelvis completed w [facility] 7/24/24 - has f/u appt w Dr. Worthington next week    Denies lightheadedness - sometimes feels weak and will check BP then - heart rate has been better " "    Would prefer not to have so many medical appointments (including w eye specialists)    PHQ-4 Score: 4     From LOV w me 2/26/24  S/p Ahmad valve placement to L eye w Dr. Jason rush LES Feb 13th Sig leticia-orbital bruising and persistent "almost" diplopia  Has been following closely w Dr. Gomez - next f/u appt in 2 days     Home /68-75  Pulse mostly      Pulse 120's here today - denies any associated chest pain, palpitations, SOB at present     PHQ-4 Score: 1     Recent appointments:   5/20/24 Pall Angela  4/08/24 [facility] Angela E[facility]    Upcoming appointments:   Date Provider Specialty Appt Notes    8/7/2024 Heather Miller NP Palliative Medicine Heather's EP/ 3 mth fu    8/7/2024 Wilbert Worthington MD Sports Medicine f/u Pelvis MRI    8/22/2024 Priti Prescott MD Ophthalmology L eye, can't read.    10/8/2024 Wilbert Ware MD Ophthalmology COAG 4-5m    10/16/2024 Phylicia Deleon MD Primary Care 11 wk f/u    11/4/2024 Jo-Ann Jacobo PA-C Cardiology 4 month    11/13/2024 Toi Kelly MD Cardiology 6 month    11/19/2024 Leeroy Zepeda MD Neurology Cogwheel rigidity [R2.452]      The following were reviewed: Active problem list, medication list, allergies, family history, social history, and Health Maintenance.     Medications have been reviewed and reconciled with patient at visit today.    Review of Systems   See HPI above    Exam: sat 98%  Vitals:    07/31/24 1053   BP: 100/62   Pulse: 91   Temp: 96.4 °F (35.8 °C)     Weight: 80.4 kg (177 lb 5.8 oz)   Body mass index is 30.44 kg/m².     BP Readings from Last 3 Encounters:   07/31/24 100/62   07/01/24 106/62   05/20/24 118/78      Wt Readings from Last 3 Encounters:   07/31/24 1053 80.4 kg (177 lb 5.8 oz)   07/01/24 0929 81.1 kg (178 lb 10.9 oz)   06/26/24 1116 80.3 kg (177 lb 0.5 oz)     Physical Exam  Vitals reviewed.   Constitutional:       General: She is not in acute distress.     Appearance: Normal " "appearance. She is obese.   HENT:      Head: Normocephalic and atraumatic.      Right Ear: External ear normal.      Left Ear: External ear normal.      Nose: Nose normal.      Mouth/Throat:      Mouth: Mucous membranes are moist.      Pharynx: Oropharynx is clear.   Eyes:      Extraocular Movements: Extraocular movements intact.      Conjunctiva/sclera: Conjunctivae normal.      Comments: glasses   Cardiovascular:      Comments: Borderline tachycardia  Pulmonary:      Effort: Pulmonary effort is normal. No respiratory distress.   Skin:     General: Skin is warm and dry.   Neurological:      Mental Status: She is alert and oriented to person, place, and time. Mental status is at baseline.      Gait: Gait abnormal.      Comments: Rollator for support   Psychiatric:         Behavior: Behavior normal.        Laboratory Reviewed  Lab Results   Component Value Date    WBC 8.04 05/20/2024    HGB 14.6 05/20/2024    HCT 43.9 05/20/2024     05/20/2024     (H) 05/20/2024    CHOL 170 08/04/2023    TRIG 116 08/04/2023    HDL 40 08/04/2023    LDLCALC 106.8 08/04/2023    ALT 16 05/20/2024    AST 20 05/20/2024     05/20/2024    K 3.8 05/20/2024    CL 99 05/20/2024    CREATININE 2.0 (H) 05/20/2024    BUN 37 (H) 05/20/2024    CO2 29 05/20/2024    MG 2.7 (H) 05/20/2024    TSH 3.577 04/27/2023    FREET4 1.03 04/27/2023    INR 1.1 10/25/2022    HGBA1C 7.1 (H) 05/20/2024    CRP 2.1 10/30/2023     Lab Results   Component Value Date    .2 (H) 04/27/2023    CALCIUM 10.2 05/20/2024    PHOS 4.0 01/08/2024      Lab Results   Component Value Date    RXYMKAGD67 454 04/27/2023     Lab Results   Component Value Date    FOLATE 8.8 04/27/2023    No results found for: "UIBC", "IRON", "TRANS", "TRANSFERRIN", "TIBC", "LABIRON", "FESATURATED"   Lab Results   Component Value Date    EGFRNORACEVR 23.2 (A) 05/20/2024    ALBUMIN 4.1 05/20/2024     (H) 04/27/2023     Lab Results   Component Value Date    PNJLRZRJ56QT 51 " 11/13/2017          Assessment:   89 y.o. female with multiple co-morbid illnesses here for continued follow up of medical problems.      The primary encounter diagnosis was Atrial fibrillation with RVR. 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      Plan:   1. Atrial fibrillation with RVR  Assessment & Plan:  Improved rate control w current Rx - metoprolol 75 mg BID - cont eliquis      2. Anxiety  -     ALPRAZolam (XANAX) 0.5 MG tablet; Take 1 tablet (0.5 mg total) by mouth nightly as needed for Anxiety.  Dispense: 30 tablet; Refill: 2    3. Statin intolerance  Overview:  Intolerant of statins and multiple BP medications.    Assessment & Plan:  LDL acceptable given pt age, preferences and goals of care - cont omega-3 supplementation      4. Type 2 diabetes mellitus with stage 4 chronic kidney disease, without long-term current use of insulin  Assessment & Plan:  Increased A1c however pt prefers to manage w diet than additional medication - will cont monitor per pt preference - encourage f/u Palliative care to review goals of care      5. 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 Abnormality of gait and mobility  Assessment & Plan:  Cont work w [facility], f/u Sports Med - rollator - fall precautions - she's unsure if wants to f/u w Neurology - doubts PD a concern      7. Macrocytosis without anemia  Assessment & Plan:  Recheck B12, mma, folate next routine labs?      Other orders  -     metOLazone (ZAROXOLYN) 2.5 MG tablet; Take 1 tablet (2.5 mg total) by mouth once a week.  Dispense: 12 tablet; Refill:  3         Health Maintenance         Date Due Completion Date    Shingles Vaccine (1 of 2) Never done ---    TETANUS VACCINE 11/09/2021 11/9/2011    Diabetes Urine Screening 03/17/2024 3/17/2023    Lipid Panel 08/04/2024 8/4/2023    Influenza Vaccine (1) 09/01/2024 10/4/2023    COVID-19 Vaccine (6 - 2023-24 season) 11/19/2024 7/19/2024    Hemoglobin A1c 11/20/2024 5/20/2024    Eye Exam 08/05/2025 8/5/2024 (Done)    Override on 8/5/2024: Done (Dr Prescott)          Declines shingles and tetanus vaccines - worried about side effects     -Patient's lab results were reviewed and discussed with patient  -Treatment options and alternatives were discussed with the patient. Patient expressed understanding. Patient was given the opportunity to ask questions and be an active participant in their medical care. Patient had no further questions or concerns at this time.     Follow up: Follow up in about 11 weeks (around 10/16/2024) for Follow Up w me.    After visit summary printed and given to patient upon discharge.  Patient care plan included in After visit summary.    TOTAL TIME evaluating and managing this patient for this encounter was 50 minutes. This time was spent personally by me on some of the following activities: review of patient's past medical history, assessing age-appropriate health maintenance needs, review of any interval history, review and interpretation of lab results, review and interpretation of imaging test results, review and interpretation of cardiology test results, reviewing consulting specialist notes, obtaining history from the patient and family, examination of the patient, medication reconciliation, managing and/or ordering prescription medications, ordering imaging tests, ordering referral to subspecialty provider(s), educating patient and answering their questions about diagnosis, treatment plan, and goals of treatment, discussing planned follow-up and final documentation of the visit. This time  was exclusive of any separately billable procedures for this patient and exclusive of time spent treating any other patients.

## 2024-08-07 ENCOUNTER — OFFICE VISIT (OUTPATIENT)
Dept: PALLIATIVE MEDICINE | Facility: CLINIC | Age: 89
End: 2024-08-07
Payer: MEDICARE

## 2024-08-07 ENCOUNTER — OFFICE VISIT (OUTPATIENT)
Dept: SPORTS MEDICINE | Facility: CLINIC | Age: 89
End: 2024-08-07
Payer: MEDICARE

## 2024-08-07 VITALS
TEMPERATURE: 98 F | DIASTOLIC BLOOD PRESSURE: 71 MMHG | OXYGEN SATURATION: 98 % | WEIGHT: 178.56 LBS | HEIGHT: 64 IN | HEART RATE: 102 BPM | BODY MASS INDEX: 30.48 KG/M2 | SYSTOLIC BLOOD PRESSURE: 104 MMHG

## 2024-08-07 VITALS — WEIGHT: 178.56 LBS | BODY MASS INDEX: 30.48 KG/M2 | RESPIRATION RATE: 17 BRPM | HEIGHT: 64 IN

## 2024-08-07 DIAGNOSIS — I13.2 HYPERTENSIVE HEART AND RENAL DISEASE WITH BOTH (CONGESTIVE) HEART FAILURE AND RENAL FAILURE: Chronic | ICD-10-CM

## 2024-08-07 DIAGNOSIS — Z51.5 ENCOUNTER FOR PALLIATIVE CARE: ICD-10-CM

## 2024-08-07 DIAGNOSIS — M25.552 ISCHIAL PAIN, LEFT: Primary | ICD-10-CM

## 2024-08-07 DIAGNOSIS — N19 HYPERTENSIVE HEART AND RENAL DISEASE WITH BOTH (CONGESTIVE) HEART FAILURE AND RENAL FAILURE: Chronic | ICD-10-CM

## 2024-08-07 DIAGNOSIS — F41.9 ANXIETY: Chronic | ICD-10-CM

## 2024-08-07 DIAGNOSIS — I48.92 ATRIAL FLUTTER WITH RAPID VENTRICULAR RESPONSE: Primary | ICD-10-CM

## 2024-08-07 PROCEDURE — 3288F FALL RISK ASSESSMENT DOCD: CPT | Mod: HCNC,CPTII,S$GLB, | Performed by: NURSE PRACTITIONER

## 2024-08-07 PROCEDURE — 1125F AMNT PAIN NOTED PAIN PRSNT: CPT | Mod: HCNC,CPTII,S$GLB, | Performed by: NURSE PRACTITIONER

## 2024-08-07 PROCEDURE — 99999 PR PBB SHADOW E&M-EST. PATIENT-LVL III: CPT | Mod: PBBFAC,HCNC,, | Performed by: STUDENT IN AN ORGANIZED HEALTH CARE EDUCATION/TRAINING PROGRAM

## 2024-08-07 PROCEDURE — 99215 OFFICE O/P EST HI 40 MIN: CPT | Mod: HCNC,S$GLB,, | Performed by: NURSE PRACTITIONER

## 2024-08-07 PROCEDURE — 1101F PT FALLS ASSESS-DOCD LE1/YR: CPT | Mod: HCNC,CPTII,S$GLB, | Performed by: NURSE PRACTITIONER

## 2024-08-07 PROCEDURE — 1123F ACP DISCUSS/DSCN MKR DOCD: CPT | Mod: HCNC,CPTII,S$GLB, | Performed by: STUDENT IN AN ORGANIZED HEALTH CARE EDUCATION/TRAINING PROGRAM

## 2024-08-07 PROCEDURE — 99214 OFFICE O/P EST MOD 30 MIN: CPT | Mod: HCNC,S$GLB,, | Performed by: STUDENT IN AN ORGANIZED HEALTH CARE EDUCATION/TRAINING PROGRAM

## 2024-08-07 PROCEDURE — 1101F PT FALLS ASSESS-DOCD LE1/YR: CPT | Mod: HCNC,CPTII,S$GLB, | Performed by: STUDENT IN AN ORGANIZED HEALTH CARE EDUCATION/TRAINING PROGRAM

## 2024-08-07 PROCEDURE — 99999 PR PBB SHADOW E&M-EST. PATIENT-LVL IV: CPT | Mod: PBBFAC,HCNC,, | Performed by: NURSE PRACTITIONER

## 2024-08-07 PROCEDURE — 99497 ADVNCD CARE PLAN 30 MIN: CPT | Mod: HCNC,S$GLB,, | Performed by: NURSE PRACTITIONER

## 2024-08-07 PROCEDURE — 1123F ACP DISCUSS/DSCN MKR DOCD: CPT | Mod: HCNC,CPTII,S$GLB, | Performed by: NURSE PRACTITIONER

## 2024-08-07 PROCEDURE — 1125F AMNT PAIN NOTED PAIN PRSNT: CPT | Mod: HCNC,CPTII,S$GLB, | Performed by: STUDENT IN AN ORGANIZED HEALTH CARE EDUCATION/TRAINING PROGRAM

## 2024-08-07 PROCEDURE — 1159F MED LIST DOCD IN RCRD: CPT | Mod: HCNC,CPTII,S$GLB, | Performed by: STUDENT IN AN ORGANIZED HEALTH CARE EDUCATION/TRAINING PROGRAM

## 2024-08-07 PROCEDURE — 3288F FALL RISK ASSESSMENT DOCD: CPT | Mod: HCNC,CPTII,S$GLB, | Performed by: STUDENT IN AN ORGANIZED HEALTH CARE EDUCATION/TRAINING PROGRAM

## 2024-08-07 PROCEDURE — 1160F RVW MEDS BY RX/DR IN RCRD: CPT | Mod: HCNC,CPTII,S$GLB, | Performed by: STUDENT IN AN ORGANIZED HEALTH CARE EDUCATION/TRAINING PROGRAM

## 2024-08-08 PROBLEM — Z51.5 ENCOUNTER FOR PALLIATIVE CARE: Status: ACTIVE | Noted: 2024-08-08

## 2024-08-09 ENCOUNTER — EXTERNAL HOME HEALTH (OUTPATIENT)
Dept: HOME HEALTH SERVICES | Facility: HOSPITAL | Age: 89
End: 2024-08-09
Payer: MEDICARE

## 2024-08-11 PROBLEM — E11.22 TYPE 2 DIABETES MELLITUS WITH CHRONIC KIDNEY DISEASE, WITHOUT LONG-TERM CURRENT USE OF INSULIN: Chronic | Status: ACTIVE | Noted: 2021-02-24

## 2024-08-11 PROBLEM — D75.89 MACROCYTOSIS WITHOUT ANEMIA: Status: ACTIVE | Noted: 2024-07-31

## 2024-08-11 PROBLEM — T88.7XXA MEDICATION SIDE EFFECTS: Status: RESOLVED | Noted: 2017-05-03 | Resolved: 2024-08-11

## 2024-08-11 PROBLEM — R26.9 ABNORMALITY OF GAIT AND MOBILITY: Chronic | Status: ACTIVE | Noted: 2024-07-31

## 2024-08-11 NOTE — ASSESSMENT & PLAN NOTE
Cont work w [facility] PT, f/u Sports Med - rollator - fall precautions - she's unsure if wants to f/u w Neurology - doubts PD a concern

## 2024-08-11 NOTE — ASSESSMENT & PLAN NOTE
F/u Palliative care as scheduled for guidance in determining goals of care, ACP - prefers to avoid overly aggressive preventative health interventions

## 2024-08-22 ENCOUNTER — OFFICE VISIT (OUTPATIENT)
Dept: OPHTHALMOLOGY | Facility: CLINIC | Age: 89
End: 2024-08-22
Payer: MEDICARE

## 2024-08-22 DIAGNOSIS — Z94.7 HISTORY OF CORNEA TRANSPLANT: ICD-10-CM

## 2024-08-22 DIAGNOSIS — H40.1131 PRIMARY OPEN ANGLE GLAUCOMA (POAG) OF BOTH EYES, MILD STAGE: ICD-10-CM

## 2024-08-22 DIAGNOSIS — H16.143 PUNCTATE KERATITIS OF BOTH EYES: Primary | ICD-10-CM

## 2024-08-22 DIAGNOSIS — Z96.1 PSEUDOPHAKIA OF BOTH EYES: ICD-10-CM

## 2024-08-22 PROCEDURE — 1159F MED LIST DOCD IN RCRD: CPT | Mod: HCNC,CPTII,S$GLB, | Performed by: OPHTHALMOLOGY

## 2024-08-22 PROCEDURE — 1160F RVW MEDS BY RX/DR IN RCRD: CPT | Mod: HCNC,CPTII,S$GLB, | Performed by: OPHTHALMOLOGY

## 2024-08-22 PROCEDURE — 99213 OFFICE O/P EST LOW 20 MIN: CPT | Mod: HCNC,S$GLB,, | Performed by: OPHTHALMOLOGY

## 2024-08-22 PROCEDURE — 99999 PR PBB SHADOW E&M-EST. PATIENT-LVL II: CPT | Mod: PBBFAC,HCNC,, | Performed by: OPHTHALMOLOGY

## 2024-08-22 NOTE — PROGRESS NOTES
HPI     Blurred Vision     Additional comments:  Prescription recheck     Pt co blurred va OS>OD when reading. States she is having double vision   with reading glasses. Distance glasses are working well.   Not sure if reading glasses are correct  Pt using vyzulta QHS OS              Comments        1. Mild COAG Goal < 19   SLT OD 3/04 (20 to 15) + 3/11 (19 to 15)   SLT OS 5/05 (20 to 14) +5/11 (18-19 to 15) + 3/12 (min resp.) + 3/11/15   (20.5-17.5) + 3/7/18 (24- 21) + 11/3/21 (22.5-17.5)  (Cosopt, Xalatan, and Timolol cause Myalgias)   (Alphagan causes redness) (zioptan too expensive)   Possible steroid responder   Xen Gel OS 12/06/23  Ahmed Valve OS with Dr. Gomez 2/13/2024  2. PCIOL OU   3. Mild Dry AMD   4. Fuch's Dystrophy   DSAEK OD 4/16 Dr. Quintanilla (followed by Dwight every summer)   DSAEK OS 8/15 Dr. Quintanilla   Rx Inveltys (1% lotemax) (IOP 15/23) 5/29/20   Dr. Quintanilla Ok'd to stop Lotemax OU  5. Dry Eye -intolerance to Restasis   6. Recent A-Fib. (from Labetolol ?)     >>>>Lotemax BID OS - Allergic, very weak, pain throughout body    Systane Ultra 4-5 tiimes daily   Travatan Z qhs os Discontinue  Rhopressa qhs OS Discontinue  Azopt qam OS Discontinue  Timolol BID OS Discontinue    OS: Vyzulta QHS  OS: Brimonidine BID- discontinue    +HAG            Last edited by Fred Otto on 8/22/2024  8:52 AM.            Assessment /Plan     For exam results, see Encounter Report.    Punctate keratitis of both eyes    History of cornea transplant    Patient reports improvement in dryness sensation with the addition of Genteal gel but still reporting fluctuating blurry vision and diplopia at near. No improvement with repeated refractions. Recommend patient follow up with Dr. Quintanilla for further evaluation. Continue frequent lubrication.    Primary open angle glaucoma (POAG) of both eyes, mild stage  Doing well, intraocular pressure (IOP) within acceptable range relative to target IOP with no evidence of progression.  Continue current treatment. Reviewed importance of continued compliance with treatment and follow up.      Continue current gtts:  Vyzulta one drop in OS nightly    Pseudophakia of both eyes  Condition stable, no therapeutic intervention necessary at this time. Will continue to monitor.

## 2024-08-30 DIAGNOSIS — I48.0 PAF (PAROXYSMAL ATRIAL FIBRILLATION): Chronic | ICD-10-CM

## 2024-09-03 ENCOUNTER — TELEPHONE (OUTPATIENT)
Dept: PALLIATIVE MEDICINE | Facility: CLINIC | Age: 89
End: 2024-09-03
Payer: MEDICARE

## 2024-09-03 RX ORDER — METOPROLOL TARTRATE 25 MG/1
75 TABLET, FILM COATED ORAL 2 TIMES DAILY
Qty: 180 TABLET | Refills: 2 | Status: SHIPPED | OUTPATIENT
Start: 2024-09-03

## 2024-09-03 NOTE — TELEPHONE ENCOUNTER
----- Message from Heather iMller NP sent at 9/3/2024  9:44 AM CDT -----  Regarding: FW: appointment access  Contact: [phone]  I've spoken to Ms Rg.   Would you put her in my 9:30 slot tomorrow please?  ----- Message -----  From: Jose Manuel Marcelo  Sent: 9/3/2024   9:04 AM CDT  To: [facility] Staff  Subject: appointment access                               Pt is calling because her blood pressure is very low and pt pos is to high. Pt will like to sp[eak with someone in your office . Please contact pt .     Shirley Grullon   [phone]    Please contact pt because pt blood pressure  is low  and pos is running high .

## 2024-09-03 NOTE — PROGRESS NOTES
Intermittent low BP, SBP<100, at home.   HR has been normal until this morning.   HR in 130s now. Recent fall, 3 days ago. Denies injury. Too weak to get off of floor.    Today 112/79, . Unable to get HR down by deep breathing. Skipped metoprolol this AM and yesterday AM due to low BP. We discussed possible causes of elevated HR and weakness. She does not feel particularly weak today and Mrs Rg does not want to got to ER today.    We discussed conservative mgmt at home. She will take midodrine now, in 30 minutes take metoprolol with goal of lowering HR. She will consider going to ER for evaluation if weakness worsens, HR remains elevated despite midodrine/metoprolol. She will be seen in clinic tomorrow morning.

## 2024-09-04 ENCOUNTER — OFFICE VISIT (OUTPATIENT)
Dept: PALLIATIVE MEDICINE | Facility: CLINIC | Age: 89
End: 2024-09-04
Payer: MEDICARE

## 2024-09-04 VITALS
HEART RATE: 59 BPM | TEMPERATURE: 97 F | BODY MASS INDEX: 30.19 KG/M2 | HEIGHT: 64 IN | OXYGEN SATURATION: 99 % | WEIGHT: 176.81 LBS | DIASTOLIC BLOOD PRESSURE: 62 MMHG | SYSTOLIC BLOOD PRESSURE: 123 MMHG

## 2024-09-04 DIAGNOSIS — N18.4 CKD STAGE 4 SECONDARY TO HYPERTENSION: Chronic | ICD-10-CM

## 2024-09-04 DIAGNOSIS — N18.4 TYPE 2 DIABETES MELLITUS WITH STAGE 4 CHRONIC KIDNEY DISEASE, WITHOUT LONG-TERM CURRENT USE OF INSULIN: Chronic | ICD-10-CM

## 2024-09-04 DIAGNOSIS — I48.91 ATRIAL FIBRILLATION WITH RVR: Chronic | ICD-10-CM

## 2024-09-04 DIAGNOSIS — I95.9 HYPOTENSION, UNSPECIFIED HYPOTENSION TYPE: Primary | ICD-10-CM

## 2024-09-04 DIAGNOSIS — Z51.5 ENCOUNTER FOR PALLIATIVE CARE: ICD-10-CM

## 2024-09-04 DIAGNOSIS — E11.22 TYPE 2 DIABETES MELLITUS WITH STAGE 4 CHRONIC KIDNEY DISEASE, WITHOUT LONG-TERM CURRENT USE OF INSULIN: Chronic | ICD-10-CM

## 2024-09-04 DIAGNOSIS — I12.9 CKD STAGE 4 SECONDARY TO HYPERTENSION: Chronic | ICD-10-CM

## 2024-09-04 DIAGNOSIS — H40.1130 CHRONIC OPEN ANGLE GLAUCOMA OF BOTH EYES, UNSPECIFIED GLAUCOMA STAGE: Chronic | ICD-10-CM

## 2024-09-04 PROCEDURE — 99999 PR PBB SHADOW E&M-EST. PATIENT-LVL III: CPT | Mod: PBBFAC,HCNC,, | Performed by: NURSE PRACTITIONER

## 2024-09-04 RX ORDER — MIDODRINE HYDROCHLORIDE 2.5 MG/1
2.5 TABLET ORAL 3 TIMES DAILY PRN
Qty: 90 TABLET | Refills: 11 | Status: SHIPPED | OUTPATIENT
Start: 2024-09-04 | End: 2025-09-04

## 2024-09-04 NOTE — ASSESSMENT & PLAN NOTE
Able to provide self care despite visual impairment  Followed by opth. Has upcoming appt with Dr Turner.

## 2024-09-04 NOTE — ASSESSMENT & PLAN NOTE
Goals of care: She would like to remain a full code presently but if resuscitation necessary she would not want to be on ventilator more than a few days. Reviewed living will and [place]. She will discuss further with her son.   Advanced directive: Would not want any prolonged ventilator support. Would not want feeding tube placed for conditions from which she is not expected to recover.   HC POA: Son Boris and daughter-in-law; they have copy of HC POA and know her HC wishes. Encouraged to bring a copy for records at [facility]  Symptoms: generalized weakness  Follow up: 3 months, sooner if needed.

## 2024-09-04 NOTE — ASSESSMENT & PLAN NOTE
Improved rate control today.   Parameters provided for midodrine and metoprolol use.   Labs in October with eye appt, declined to check sooner.

## 2024-09-04 NOTE — PROGRESS NOTES
Palliative Medicine Clinic Note    Chief Complaint:   Chief Complaint   Patient presents with    Follow up    Weakness, Low BP     ASSESSMENT/PLAN:      Plan/Recommendations:  Problem List Items Addressed This Visit          Ophtho    COAG (chronic open-angle glaucoma) - Both Eyes (Chronic)     Able to provide self care despite visual impairment  Followed by opth. Has upcoming appt with Dr Turner.             Cardiac/Vascular    Atrial fibrillation with RVR (Chronic)     Improved rate control today.   Parameters provided for midodrine and metoprolol use.   Labs in October with eye appt, declined to check sooner.              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 Reviewed living will and [place]POST. She will discuss further with her son.   Advanced directive: Would not want any prolonged ventilator support. Would not want feeding tube placed for conditions from which she is not expected to recover.   HC POA: Son Boris and daughter-in-law; they have copy of HC POA and know her HC wishes. Encouraged to bring a copy for records at [facility]  Symptoms: generalized weakness  Follow up: 3 months, sooner if needed.          Other Visit Diagnoses       Hypotension, unspecified hypotension type    -  Primary    Relevant Medications    midodrine (PROAMATINE) 2.5 MG Tab            Advance Care Planning   Advance Directives:   Living Will: Yes        Copy on chart: No   "  [place]POST: No (Reviewed [place]POST. She will take home a copy and discuss with her son who is her HC POA.)    Agent's Name:  Boris Bettencourt   Agent's Contact Number:  [phone]    Decision Making:  Patient answered questions  Goals of Care: What is most important right now is to focus on avoiding the hospital, remaining as independent as possible, symptom/pain control. Accordingly, we have decided that the best plan to meet the patient's goals includes continuing with treatment.           Thank you for involving me in the care of this patient.     Follow up in about 3 months (around 12/4/2024).    Future Appointments   Date Time Provider Department Center   10/8/2024  9:15 AM Wilbert Ware MD ON [facility]   10/16/2024 10:20 AM Phylicia Deleon MD [facility]   11/4/2024 10:00 AM Jo-Ann Jacobo PA-C ON CARDIO [facility]   11/13/2024 10:00 AM Toi Kelly MD ON [facility]R [facility]   11/19/2024 11:00 AM Leeroy Zepeda MD ON NEURO [facility]        SUBJECTIVE:      History of Present Illness / Interval History:  Shirley Metz is 89 y.o. 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 Presents to Palliative Care Clinic for physical symptoms, advance care planning,, and additional support.. Please see cardiology and PCP note for more details.       9/4/24  History obtained from: patient and medical record.    Took midodrine yesterday morning after our conversation.   Continued with weakness. HR decreased afterward.     Pain throughout body, "hobbles to bathroom."  Relieved by rest, then buttock hurts.   "Knots" in shoulder muscles relieved by PT. Difficulty reaching overhead. Onset few months ago.     Now PT at home for LBP, radiates to RLE.   Difficulty exercising past few weeks r/t to rapid ventricular rate.     Had a fall since LOV. In " apt, turning rollator, landed on floor. Rollator landed on top of her. Unable to pull herself upright because her arms were too weak. No LOC, denies injury. Arms still weak, intermittent pain B arms.     Continues with diplopia with reading. Floaters left eye. Now has choritis.     Appetite is good. Reduced carb intake 4 months ago.   Has reduced sweets now.     Today we discussed symptom management, goals of care, and advanced care planning.          ROS:  Review of Systems    Review of Symptoms      Symptom Assessment (ESAS 0-10 Scale)  Pain:  3  Dyspnea:  0  Anxiety:  0  Nausea:  0  Depression:  0  Anorexia:  0  Fatigue:  5  Insomnia:  0  Restlessness:  0  Agitation:  0     CAM / Delirium:  Negative  Constipation:  Negative  Diarrhea:  Negative      Bowel Management Plan (BMP):  No      Pain Assessment:    Location(s): generalized    Generalized       Location: generalized        Quality: Aching        Quantity: 3/10 in intensity        Chronicity: Onset 6 month(s) ago, stable        Aggravating Factors: Activity        Alleviating Factors: Activity        Associated Symptoms: None    ECOG Performance Status stGstrstastdstest:st st1st Living Arrangements:  Lives alone and Lives in apartment    Psychosocial/Cultural:   See Palliative Psychosocial Note: No  **Primary  to Follow**  Palliative Care  Consult: No        Medications:    Current Outpatient Medications:     acetaminophen (TYLENOL) 500 MG tablet, Take 500 mg by mouth nightly as needed., Disp: , Rfl:     ALPRAZolam (XANAX) 0.5 MG tablet, Take 1 tablet (0.5 mg total) by mouth nightly as needed for Anxiety., Disp: 30 tablet, Rfl: 2    b complex vitamins capsule, Take 1 capsule by mouth once daily., Disp: , Rfl:     calcium carbonate/vitamin D3 (VITAMIN D-3 ORAL), Take 4,000 Int'l Units/day by mouth once daily., Disp: , Rfl:     ELIQUIS 2.5 mg Tab, TAKE 1 TABLET (2.5 MG TOTAL) BY MOUTH 2 (TWO) TIMES DAILY., Disp: 180 tablet, Rfl: 2    furosemide  (LASIX) 40 MG tablet, TAKE 1 TABLET (40 MG TOTAL) BY MOUTH 2 TIMES A DAY., Disp: 180 tablet, Rfl: 0    latanoprostene bunod (VYZULTA) 0.024 % Drop, Place 1 drop into the left eye every evening., Disp: , Rfl:     metOLazone (ZAROXOLYN) 2.5 MG tablet, Take 1 tablet (2.5 mg total) by mouth once a week., Disp: 12 tablet, Rfl: 3    metoprolol tartrate (LOPRESSOR) 25 MG tablet, TAKE 3 TABLETS (75 MG TOTAL) BY MOUTH 2 (TWO) TIMES DAILY., Disp: 180 tablet, Rfl: 2    midodrine (PROAMATINE) 2.5 MG Tab, Take 1 tablet (2.5 mg total) by mouth 3 (three) times daily as needed (low BP)., Disp: 90 tablet, Rfl: 11    External  database queried on 09/04/2024  by Heather CHU :   N/A    Review of patient's allergies indicates:   Allergen Reactions    Carvedilol Swelling     Lip swelling    Cephalexin Other (See Comments)     Muscle/joint weakness unable to move     Doxycycline Shortness Of Breath, Nausea And Vomiting and Other (See Comments)     weakness    Erythromycin Other (See Comments)     Complete weakness/could not walk    Gadolinium-containing contrast media Swelling     angioedema    Hydrocodone-acetaminophen Shortness Of Breath     wheezing    Labetalol Shortness Of Breath, Nausea Only, Palpitations and Other (See Comments)     weakness    Loratadine Other (See Comments)     Double vision/spots  Other reaction(s): double vision    Losartan Other (See Comments)     weakness    Lotemax [loteprednol etabonate] Palpitations and Other (See Comments)     Patient stated bp went up and extreme muscle weakness    Naproxen      wheezing  wheezing  wheezing  Other reaction(s): wheezing    Statins-hmg-coa reductase inhibitors Other (See Comments)     Severe Muscle weakness  Muscle weakness  Severe Muscle weakness  Other reaction(s): severe muscle weakness    Amlodipine Other (See Comments)     Myalgias    Fenofibrate Other (See Comments)     muscle weakness      Hydralazine Other (See Comments)     muscle tremors     Hydralazine analogues Other (See Comments)     Muscle tremors/aches      Loteprednol Other (See Comments)     increased BP    Macrolide antibiotics Other (See Comments)     Complete weakness/could not walk      Moxifloxacin Hives and Other (See Comments)     muscle soreness    Timolol Other (See Comments)     Blurred vision, Burning eyes, Severe muscle weakness, eye problems.      Verapamil Diarrhea     Severe diarrhea.      Hydrocortisone     Isosorbide      Tolerates Imdur    Tetanus and diphtheria toxoids     Adhesive Rash     Clonidine patch--contact dermatitis    Codeine Diarrhea and Other (See Comments)     Loss of balance       Doxazosin Palpitations    Fexofenadine Other (See Comments)     Double vision/spots       Hydrocortisone (bulk) Other (See Comments)     Only suppository/ caused muscle weakness    Latanoprost Other (See Comments)     Muscle weakness      Olopatadine Other (See Comments)     Muscle weakness      Prednisone Anxiety        OBJECTIVE:   Physical Exam:  Vitals: Temp: 97.3 °F (36.3 °C) (09/04/24 0936)  Pulse: (!) 59 (09/04/24 0936)  BP: 123/62 (09/04/24 0936)  SpO2: 99 % (09/04/24 0936)    Physical Exam  Constitutional:       General: She is not in acute distress.     Appearance: She is not ill-appearing.   HENT:      Mouth/Throat:      Mouth: Mucous membranes are moist.   Eyes:      General: No scleral icterus.  Cardiovascular:      Rate and Rhythm: Normal rate. Rhythm irregular.      Heart sounds: No murmur heard.  Pulmonary:      Effort: Pulmonary effort is normal.      Breath sounds: Normal breath sounds.   Abdominal:      General: There is no distension.      Palpations: Abdomen is soft.      Tenderness: There is no abdominal tenderness.   Musculoskeletal:         General: No swelling or deformity.      Cervical back: Neck supple.   Skin:     General: Skin is warm and dry.      Coloration: Skin is not jaundiced.   Neurological:      General: No focal deficit present.      Mental  Status: She is alert.   Psychiatric:         Mood and Affect: Mood normal.         Behavior: Behavior normal.         Thought Content: Thought content normal.         Judgment: Judgment normal.         Wt Readings from Last 3 Encounters:   09/04/24 0936 80.2 kg (176 lb 12.9 oz)   08/07/24 1126 81 kg (178 lb 9.2 oz)   08/07/24 0927 81 kg (178 lb 9.2 oz)     BP Readings from Last 3 Encounters:   09/04/24 123/62   08/07/24 104/71   07/31/24 100/62       Labs:  Lab Results   Component Value Date    WBC 8.04 05/20/2024    HGB 14.6 05/20/2024    HCT 43.9 05/20/2024     (H) 05/20/2024     05/20/2024       Sodium   Date Value Ref Range Status   05/20/2024 138 136 - 145 mmol/L Final     Potassium   Date Value Ref Range Status   05/20/2024 3.8 3.5 - 5.1 mmol/L Final     Glucose   Date Value Ref Range Status   05/20/2024 114 (H) 70 - 110 mg/dL Final     BUN   Date Value Ref Range Status   05/20/2024 37 (H) 10 - 30 mg/dL Final     Creatinine   Date Value Ref Range Status   05/20/2024 2.0 (H) 0.5 - 1.4 mg/dL Final     Calcium   Date Value Ref Range Status   05/20/2024 10.2 8.7 - 10.5 mg/dL Final     Total Protein   Date Value Ref Range Status   05/20/2024 8.0 6.0 - 8.4 g/dL Final     Albumin   Date Value Ref Range Status   05/20/2024 4.1 3.5 - 5.2 g/dL Final     AST   Date Value Ref Range Status   05/20/2024 20 10 - 40 U/L Final     ALT   Date Value Ref Range Status   05/20/2024 16 10 - 44 U/L Final     eGFR   Date Value Ref Range Status   05/20/2024 23.2 (A) >60 mL/min/1.73 m^2 Final       Imaging:  MRI Results:     MRI, PELVIS, SACRUM OR SACROILIAC S/ CONTRAST     ORDERING PHYSICIAN: YEHUDA SOARES      REASON FOR EXAM: , left-sided buttock pain with palpable mass times 4-5 months.  (Area marked with skin marker)      COMPARISON: None.      TECHNIQUE: Axial and sagittal T1 and T2 weighted images of the spine were obtained.  No gadolinium was administered.      FINDINGS: The bony structures of the pelvis are  intact.  There is asymmetric subcutaneous soft tissue signal in the left buttock extending D into the pelvis near the posterior ischial tuberosity extending along the posterior aspect of the left hip joint.  No joint fluid or joint involvement is noted.  Inflammatory or infectious cellulitis is suspected. No definite mass or focal fluid collection is seen. The SI joints are intact.      IMPRESSION: Abnormal, asymmetric soft tissue signal in the deep soft tissues of the posterior left hemipelvis.  Inflammatory/infectious etiology is suspected.  No gross bone or left hip joint involvement is noted.  No focal mass or fluid collection is noted.      Dictated By:  Philip Swift M.D., Radiologist   Electronically signed By: Philip Swift M.D., Radiologist on 07/24/2024 at 02:38 PM           I spent a total of 60 minutes on the day of the visit. This includes face to face time in discussion of goals of care, symptom assessment, coordination of care and emotional support.  This also includes non-face to face time preparing to see the patient (eg, review of tests/imaging), obtaining and/or reviewing separately obtained history, documenting clinical information in the electronic or other health record, independently interpreting results and communicating results to the patient/family/caregiver, or care coordinator.     Additional 23 min time spent on a voluntary advance care planning and /or goals of care discussion, providing emotional support, formulating and communicating prognosis and exploring burden/benefit of various approaches of treatment.       Heather Miller NP

## 2024-09-04 NOTE — PATIENT INSTRUCTIONS
If BP is normal and you have no symptoms then take metoprolol to keep your HR controlled.   If BP low or low normal and/or you feel weak then take midodrine to raise BP and take metoprolol to keep your HR controlled.   Contact cardiology or myself if HR remains elevated (100s).

## 2024-09-19 ENCOUNTER — TELEPHONE (OUTPATIENT)
Dept: CARDIOLOGY | Facility: CLINIC | Age: 89
End: 2024-09-19
Payer: MEDICARE

## 2024-09-19 DIAGNOSIS — I48.19 PERSISTENT ATRIAL FIBRILLATION: ICD-10-CM

## 2024-09-19 DIAGNOSIS — I95.9 HYPOTENSION, UNSPECIFIED HYPOTENSION TYPE: ICD-10-CM

## 2024-09-19 DIAGNOSIS — I65.23 CAROTID ARTERY CALCIFICATION, BILATERAL: Primary | ICD-10-CM

## 2024-09-19 NOTE — TELEPHONE ENCOUNTER
Contacted pt and got her scheduled for labs on 09/23/24 to make sure she is not dehydrated or anemic,as well as scheduled w/ JERARDO Banuelos on 10/09/24. Also informed her A. JERARDO Jacobo is ok w/ her taking the midodrine as needed. Pt vu w/o q/c    ---- JERARDO Dempsey 09/19/24 02:46 PM ----  Is there any way we can get her in for labs?     To make sure she is not dehydrated or anemic, which could be contributing to her low BP.     I am ok with her using midodrine as needed.     Please schedule with me ASAP     ---- Outgoing ----  Called pt back regarding low BP. Pt states it has been going on for months where her BP will drop for a few days, return to normal, and then drop again. She states her readings show:  09/18 PM BP was 88/63 P 95 - took a midodrine = 108/61  09/19 /58 P 86  09/19 Noon 97/73 - took a midodrine = 116/79    Pt verified her current medications and states she has not started anything new. Attempted to schedule appt w/ JERARDO Banuelos, Chelsea Hughes NP, and Juany Amin NP but she is unable to drive and the bus runs on certain days. Pt refused appt with any other provider.     ----- Message from Ally Murrieta sent at 9/19/2024  2:26 PM CDT -----  Contact: [phone]  [email or web address]     Patient is calling for Medical Advice regarding:low blood pressure     How long has patient had these symptoms:asking for a soon appt     Pharmacy name and phone#:    Patient wants a call back or thru [facility]:call back     Comments:  She states it gets low and she can get it back up but then it ends up going low again   Please advise patient replies from provider may take up to 48 hours.

## 2024-09-20 ENCOUNTER — PATIENT MESSAGE (OUTPATIENT)
Dept: PALLIATIVE MEDICINE | Facility: CLINIC | Age: 89
End: 2024-09-20
Payer: MEDICARE

## 2024-09-20 DIAGNOSIS — G72.9 MYOPATHY: Primary | ICD-10-CM

## 2024-09-23 ENCOUNTER — LAB VISIT (OUTPATIENT)
Dept: LAB | Facility: HOSPITAL | Age: 89
End: 2024-09-23
Attending: PHYSICIAN ASSISTANT
Payer: MEDICARE

## 2024-09-23 DIAGNOSIS — I65.23 CAROTID ARTERY CALCIFICATION, BILATERAL: ICD-10-CM

## 2024-09-23 DIAGNOSIS — R73.9 HYPERGLYCEMIA: ICD-10-CM

## 2024-09-23 DIAGNOSIS — R20.8 OTHER DISTURBANCES OF SKIN SENSATION: ICD-10-CM

## 2024-09-23 DIAGNOSIS — I95.9 HYPOTENSION, UNSPECIFIED HYPOTENSION TYPE: ICD-10-CM

## 2024-09-23 DIAGNOSIS — I48.19 PERSISTENT ATRIAL FIBRILLATION: ICD-10-CM

## 2024-09-23 DIAGNOSIS — M47.816 LUMBAR SPONDYLOSIS: ICD-10-CM

## 2024-09-23 LAB
ALBUMIN SERPL BCP-MCNC: 3.7 G/DL (ref 3.5–5.2)
ALP SERPL-CCNC: 70 U/L (ref 55–135)
ALT SERPL W/O P-5'-P-CCNC: 13 U/L (ref 10–44)
ANION GAP SERPL CALC-SCNC: 11 MMOL/L (ref 8–16)
AST SERPL-CCNC: 19 U/L (ref 10–40)
BASOPHILS # BLD AUTO: 0.06 K/UL (ref 0–0.2)
BASOPHILS NFR BLD: 0.8 % (ref 0–1.9)
BILIRUB SERPL-MCNC: 0.6 MG/DL (ref 0.1–1)
BUN SERPL-MCNC: 44 MG/DL (ref 10–30)
CALCIUM SERPL-MCNC: 9.4 MG/DL (ref 8.7–10.5)
CHLORIDE SERPL-SCNC: 104 MMOL/L (ref 95–110)
CO2 SERPL-SCNC: 29 MMOL/L (ref 23–29)
CREAT SERPL-MCNC: 2 MG/DL (ref 0.5–1.4)
DIFFERENTIAL METHOD BLD: ABNORMAL
EOSINOPHIL # BLD AUTO: 0.3 K/UL (ref 0–0.5)
EOSINOPHIL NFR BLD: 4 % (ref 0–8)
ERYTHROCYTE [DISTWIDTH] IN BLOOD BY AUTOMATED COUNT: 12.8 % (ref 11.5–14.5)
EST. GFR  (NO RACE VARIABLE): 23.2 ML/MIN/1.73 M^2
ESTIMATED AVG GLUCOSE: 151 MG/DL (ref 68–131)
FOLATE SERPL-MCNC: 13.5 NG/ML (ref 4–24)
GLUCOSE SERPL-MCNC: 167 MG/DL (ref 70–110)
HBA1C MFR BLD: 6.9 % (ref 4–5.6)
HCT VFR BLD AUTO: 39.9 % (ref 37–48.5)
HGB BLD-MCNC: 13.1 G/DL (ref 12–16)
IMM GRANULOCYTES # BLD AUTO: 0.02 K/UL (ref 0–0.04)
IMM GRANULOCYTES NFR BLD AUTO: 0.3 % (ref 0–0.5)
LYMPHOCYTES # BLD AUTO: 1.5 K/UL (ref 1–4.8)
LYMPHOCYTES NFR BLD: 21.2 % (ref 18–48)
MCH RBC QN AUTO: 34 PG (ref 27–31)
MCHC RBC AUTO-ENTMCNC: 32.8 G/DL (ref 32–36)
MCV RBC AUTO: 104 FL (ref 82–98)
MONOCYTES # BLD AUTO: 0.7 K/UL (ref 0.3–1)
MONOCYTES NFR BLD: 9.5 % (ref 4–15)
NEUTROPHILS # BLD AUTO: 4.5 K/UL (ref 1.8–7.7)
NEUTROPHILS NFR BLD: 64.2 % (ref 38–73)
NRBC BLD-RTO: 0 /100 WBC
PLATELET # BLD AUTO: 188 K/UL (ref 150–450)
PMV BLD AUTO: 10.2 FL (ref 9.2–12.9)
POTASSIUM SERPL-SCNC: 3.9 MMOL/L (ref 3.5–5.1)
PROT SERPL-MCNC: 7.1 G/DL (ref 6–8.4)
RBC # BLD AUTO: 3.85 M/UL (ref 4–5.4)
SODIUM SERPL-SCNC: 144 MMOL/L (ref 136–145)
VIT B12 SERPL-MCNC: 334 PG/ML (ref 210–950)
WBC # BLD AUTO: 7.07 K/UL (ref 3.9–12.7)

## 2024-09-23 PROCEDURE — 36415 COLL VENOUS BLD VENIPUNCTURE: CPT | Mod: HCNC | Performed by: INTERNAL MEDICINE

## 2024-09-23 PROCEDURE — 82607 VITAMIN B-12: CPT | Mod: HCNC | Performed by: NURSE PRACTITIONER

## 2024-09-23 PROCEDURE — 80053 COMPREHEN METABOLIC PANEL: CPT | Mod: HCNC | Performed by: PHYSICIAN ASSISTANT

## 2024-09-23 PROCEDURE — 83036 HEMOGLOBIN GLYCOSYLATED A1C: CPT | Mod: HCNC | Performed by: INTERNAL MEDICINE

## 2024-09-23 PROCEDURE — 82746 ASSAY OF FOLIC ACID SERUM: CPT | Mod: HCNC | Performed by: NURSE PRACTITIONER

## 2024-09-23 PROCEDURE — 85025 COMPLETE CBC W/AUTO DIFF WBC: CPT | Mod: HCNC | Performed by: PHYSICIAN ASSISTANT

## 2024-09-26 NOTE — PROGRESS NOTES
Pt has [facility] - if message below not viewed please call w information below:   Good day Ms. Bettencourt    A1c is stable. We can discuss further at f/u appointment next month.    Please message or call with any questions or concerns!  Thank you!  Phylicia Deleon MD, MPH  [facility]

## 2024-09-27 ENCOUNTER — TELEPHONE (OUTPATIENT)
Dept: CARDIOLOGY | Facility: CLINIC | Age: 89
End: 2024-09-27
Payer: MEDICARE

## 2024-09-27 NOTE — TELEPHONE ENCOUNTER
----- Message from Jo-Ann Jacobo PA-C sent at 9/27/2024 11:11 AM CDT -----  Labs reviewed. H/H stable. Creatinine fluctuates, in similar range as prior.

## 2024-10-08 ENCOUNTER — OFFICE VISIT (OUTPATIENT)
Dept: OPHTHALMOLOGY | Facility: CLINIC | Age: 89
End: 2024-10-08
Payer: MEDICARE

## 2024-10-08 DIAGNOSIS — H26.9: ICD-10-CM

## 2024-10-08 DIAGNOSIS — H40.1131 PRIMARY OPEN ANGLE GLAUCOMA (POAG) OF BOTH EYES, MILD STAGE: Primary | ICD-10-CM

## 2024-10-08 DIAGNOSIS — Z96.1 PSEUDOPHAKIA OF BOTH EYES: ICD-10-CM

## 2024-10-08 PROCEDURE — 1126F AMNT PAIN NOTED NONE PRSNT: CPT | Mod: HCNC,CPTII,S$GLB, | Performed by: OPHTHALMOLOGY

## 2024-10-08 PROCEDURE — 99999 PR PBB SHADOW E&M-EST. PATIENT-LVL III: CPT | Mod: PBBFAC,HCNC,, | Performed by: OPHTHALMOLOGY

## 2024-10-08 PROCEDURE — 1160F RVW MEDS BY RX/DR IN RCRD: CPT | Mod: HCNC,CPTII,S$GLB, | Performed by: OPHTHALMOLOGY

## 2024-10-08 PROCEDURE — 99214 OFFICE O/P EST MOD 30 MIN: CPT | Mod: HCNC,S$GLB,, | Performed by: OPHTHALMOLOGY

## 2024-10-08 PROCEDURE — 1159F MED LIST DOCD IN RCRD: CPT | Mod: HCNC,CPTII,S$GLB, | Performed by: OPHTHALMOLOGY

## 2024-10-08 RX ORDER — LATANOPROSTENE BUNOD 0.24 MG/ML
1 SOLUTION/ DROPS OPHTHALMIC NIGHTLY
Qty: 2.5 ML | Refills: 12 | Status: SHIPPED | OUTPATIENT
Start: 2024-10-08

## 2024-10-08 NOTE — PROGRESS NOTES
SUBJECTIVE  AdrianaIfrah Metz is 89 y.o. female  Corrected distance visual acuity was 20/25 -2 in the right eye and 20/50 in the left eye.   Chief Complaint   Patient presents with    Glaucoma     4-5 month IOP check. VA is not any better. No pain or irritation. Compliant with gtt.          HPI     Glaucoma     Additional comments: 4-5 month IOP check. VA is not any better. No pain   or irritation. Compliant with gtt.           Comments      1. Mild COAG Goal < 19   SLT OD 3/04 (20 to 15) + 3/11 (19 to 15)   SLT OS 5/05 (20 to 14) +5/11 (18-19 to 15) + 3/12 (min resp.) + 3/11/15   (20.5-17.5) + 3/7/18 (24- 21) + 11/3/21 (22.5-17.5)  (Cosopt, Xalatan, and Timolol cause Myalgias)   (Alphagan causes redness) (zioptan too expensive)   Possible steroid responder   Xen Gel OS 12/06/23  Ahmed Valve OS with Dr. Gomez 2/13/2024  2. PCIOL OU   3. Mild Dry AMD   4. Fuch's Dystrophy   DSAEK OD 4/16 Dr. Quintanilla (followed by [facility]ight every summer)   DSAEK OS 8/15 Dr. Quintanilla   Rx Inveltys (1% lotemax) (IOP 15/23) 5/29/20   Dr. Quintanilla Ok'd to stop Lotemax OU  5. Dry Eye -intolerance to Restasis   6. Recent A-Fib. (from Labetolol ?)     >>>>Lotemax BID OS - Allergic, very weak, pain throughout body    Systane Ultra 4-5 tiimes daily   Travatan Z qhs os Discontinue  Rhopressa qhs OS Discontinue  Azopt qam OS Discontinue  Timolol BID OS Discontinue    OS: Vyzulta QHS  OS: Brimonidine BID- discontinue    +HAG             Last edited by Jairo Esposito on 10/8/2024  9:19 AM.         Assessment /Plan :  1. Primary open angle glaucoma (POAG) of both eyes, mild stage Doing well, intraocular pressure (IOP) within acceptable range relative to target IOP and no evidence of progression. Continue current treatment. Reviewed importance of continued compliance with treatment and follow up.    Pt was released by Dr. Eric Gomez. Recommend patient establish care with Dr. Aleksandr Conley.    Patient instructed to continue using the following glaucoma  medication as follows:  Vyzulta one drop in the left eye nightly    Return to clinic as scheduled with Dr. Quintanilla or as needed.      2. Pseudophakia of both eyes  -- Condition stable, no therapeutic change required. Monitoring routinely.     3. Anterior opacification of left lens - Being followed by Dr. Quintanilla.

## 2024-10-09 ENCOUNTER — PATIENT MESSAGE (OUTPATIENT)
Dept: PALLIATIVE MEDICINE | Facility: CLINIC | Age: 89
End: 2024-10-09
Payer: MEDICARE

## 2024-10-16 ENCOUNTER — OFFICE VISIT (OUTPATIENT)
Dept: PRIMARY CARE CLINIC | Facility: CLINIC | Age: 89
End: 2024-10-16
Payer: MEDICARE

## 2024-10-16 VITALS
WEIGHT: 182 LBS | HEIGHT: 64 IN | DIASTOLIC BLOOD PRESSURE: 60 MMHG | SYSTOLIC BLOOD PRESSURE: 104 MMHG | OXYGEN SATURATION: 94 % | HEART RATE: 116 BPM | TEMPERATURE: 97 F | BODY MASS INDEX: 31.07 KG/M2

## 2024-10-16 DIAGNOSIS — I48.19 PERSISTENT ATRIAL FIBRILLATION: Chronic | ICD-10-CM

## 2024-10-16 DIAGNOSIS — H40.1130 CHRONIC OPEN ANGLE GLAUCOMA OF BOTH EYES, UNSPECIFIED GLAUCOMA STAGE: Primary | Chronic | ICD-10-CM

## 2024-10-16 DIAGNOSIS — N18.4 TYPE 2 DIABETES MELLITUS WITH STAGE 4 CHRONIC KIDNEY DISEASE, WITHOUT LONG-TERM CURRENT USE OF INSULIN: Chronic | ICD-10-CM

## 2024-10-16 DIAGNOSIS — Z94.7 HISTORY OF CORNEA TRANSPLANT: Chronic | ICD-10-CM

## 2024-10-16 DIAGNOSIS — N18.4 CKD STAGE 4 SECONDARY TO HYPERTENSION: Chronic | ICD-10-CM

## 2024-10-16 DIAGNOSIS — E11.22 TYPE 2 DIABETES MELLITUS WITH STAGE 4 CHRONIC KIDNEY DISEASE, WITHOUT LONG-TERM CURRENT USE OF INSULIN: Chronic | ICD-10-CM

## 2024-10-16 DIAGNOSIS — R54 FRAILTY SYNDROME IN GERIATRIC PATIENT: Chronic | ICD-10-CM

## 2024-10-16 DIAGNOSIS — I12.9 CKD STAGE 4 SECONDARY TO HYPERTENSION: Chronic | ICD-10-CM

## 2024-10-16 LAB
ALBUMIN/CREAT UR: 91.7 UG/MG (ref 0–30)
CREAT UR-MCNC: 108 MG/DL (ref 15–325)
MICROALBUMIN UR DL<=1MG/L-MCNC: 99 UG/ML

## 2024-10-16 PROCEDURE — 99999 PR PBB SHADOW E&M-EST. PATIENT-LVL IV: CPT | Mod: PBBFAC,HCNC,, | Performed by: INTERNAL MEDICINE

## 2024-10-16 PROCEDURE — 99215 OFFICE O/P EST HI 40 MIN: CPT | Mod: HCNC,S$GLB,, | Performed by: INTERNAL MEDICINE

## 2024-10-16 PROCEDURE — 82570 ASSAY OF URINE CREATININE: CPT | Mod: HCNC | Performed by: INTERNAL MEDICINE

## 2024-10-16 PROCEDURE — 1125F AMNT PAIN NOTED PAIN PRSNT: CPT | Mod: HCNC,CPTII,S$GLB, | Performed by: INTERNAL MEDICINE

## 2024-10-16 RX ORDER — METOLAZONE 2.5 MG/1
2.5 TABLET ORAL
Qty: 12 TABLET | Refills: 3 | Status: SHIPPED | OUTPATIENT
Start: 2024-10-16 | End: 2025-10-16

## 2024-10-16 NOTE — PROGRESS NOTES
Shirley Montilla  10/16/2024  [id number]    Phylicia Deleon MD  Patient Care Team:  Phylicia Deleon MD as PCP - General (Internal Medicine)  Wilbert Ware MD as Consulting Physician (Ophthalmology)  Rajinder Quintanilla MD as Consulting Physician (Ophthalmology)  Rosario Valadez MD as Consulting Physician (Dermatology)  Amish Mendoza MD (Pulmonary Disease)  Emmanuel Fish MD as Consulting Physician (Hand Surgery)  Phylicia Deleon MD as Physician (Internal Medicine)  Heather Miller NP as Nurse Practitioner (Palliative Medicine)  Jo-Ann Jacobo PA-C as Physician Assistant (Cardiology)  Chelsea Hughes FNP-C as Nurse Practitioner (Cardiology)  Wilbert Worthington MD as Consulting Physician (Orthopedic Surgery)    Visit Type: Follow-up    Ms. Edmond is a 89 year old female w multiple chronic medical issues including Diastolic CHF, HTN, atherosclerosis, CKD, Afib/aflutter w RVR on Eliquis, obesity, pre-diabetes, osteopenia, and OA.     History of Present Illness    CHIEF COMPLAINT:  Ms. Sg montilla presents today for follow up.    OPHTHALMOLOGICAL ISSUES:  She reports ongoing diplopia since eye surgery, which has improved but persists, particularly when reading. She describes difficulty reading with the affected eye, noting a visual disturbance similar to stationary floaters, creating a ruffled appearance in her vision. This affects her ability to read, with some letters visible and others not. A liquid film inside the eye from her cataract surgery in the early 1990s is suspected. She is currently under the care of multiple eye specialists and has upcoming appointments with Dr. Conley (glaucoma specialist) and Dr. Sanchez (eye transplant surgeon).    CARDIOVASCULAR CONCERNS:  She reports recent episodes of low blood pressure occurring in spells, with three episodes since her eye operations. Home blood pressure readings from early September to October 9th range from  90s/50s to 114/70s, with occasional higher readings of 135-142 systolic. She has been prescribed midodrine but is reluctant to use it due to scalp irritation. She uses coffee and potato chips as alternative remedies to raise her blood pressure. She reports occasional episodes of atrial fibrillation, noting that her blood pressure cuff shuts off when atrial fibrillation is detected.    MEDICATION MANAGEMENT:  She takes metolazone (Zaroxolin) as needed, approximately once weekly, when she feels the furosemide (Lasix) is not providing sufficient diuresis. She continues to take furosemide as prescribed. She is currently taking metoprolol, Eliquis, and eye drops, and reports being off most of her other medications.    MUSCULOSKELETAL PAIN:  She reports pelvic pain in the left buttock area, describing bumps on the bottom of her pelvic bone that cause pain when sitting. An MRI revealed an abnormal asymmetric soft tissue signal in the deep soft tissues of the posterior left hemipelvis, with Dr. Worthington assessing inflammation around the hamstring insertion. She experiences swelling and hardness near the ischium, which varies in size. She also reports recent weakness in her arms and hands, difficulty with fine motor skills, leg weakness, back pain (particularly in the morning), and sciatic nerve pain. She completed six weeks of physical therapy for arm and leg weakness, which she found helpful, but has not fully recovered her strength.    DERMATOLOGICAL ISSUES:  She reports a spot on her back that she would like examined. She also describes ongoing itching affecting her forehead, ears, and back, which has been less severe in the past couple of days. She has been applying Benadryl or another topical medication to the affected areas for immediate relief.    NEW SYMPTOMS:  She reports experiencing new, intense hot flashes over the past three weeks, primarily occurring at night when going to bed or upon waking in the morning.  These episodes necessitate her getting up, removing clothing, and actively cooling down, a process that can take up to 30 minutes.      DIABETES MANAGEMENT:  Her recent A1C was in the diabetic range. She is not currently taking any medications for diabetes and does not express interest in starting medication. She has been monitoring her carbohydrate intake and has reduced her sugar consumption to manage her glucose levels.    MOBILITY AND SELF-CARE:  She reports difficulty reaching her back, particularly due to limited range of motion in her left arm, which has an artificial joint. She is unable to bend her arms to access her back and mentions experiencing occasional pain in her right arm.    IMMUNIZATIONS:  She reports receiving immunizations at her living facility, [facility].    UPCOMING APPOINTMENTS:  She has appointments scheduled with Jo-Ann Jacobo, Dr. Conley, Dr. Kelly, Dr. Sanchez, and neurologist Dr. Mary Helm. She expresses skepticism about having neurological issues and voices concerns about potential EMG testing due to her A-fib.    ROS:  General: -fever, -chills, -fatigue, -weight gain, -weight loss, +night sweats  Eyes: +vision changes, -redness, -discharge  ENT: -ear pain, -nasal congestion, -sore throat  Cardiovascular: -chest pain, +palpitations, -lower extremity edema  Respiratory: -cough, -shortness of breath  Gastrointestinal: -abdominal pain, -nausea, -vomiting, -diarrhea, -constipation, -blood in stool  Genitourinary: -dysuria, -hematuria, -frequency  Musculoskeletal: +joint pain, -muscle pain, +muscle weakness  Skin: -rash, -lesion, +itching  Neurological: -headache, -dizziness, -numbness, -tingling  Psychiatric: -anxiety, -depression, -sleep difficulty        PHQ-4 Score: 0     From LOV w me 7/31/24  [facility] PT 2x week on wk 3 of 6   Palliative Care f/u next week also      Was FINALLY able to get CoVid vaccine at [facility]     Continued eye problems - persistent double vision  For  "near vision glasses trouble reading from L eye but can read from R eye     Ophthal: Jeanie previous glaucoma specialist - did glaucoma "stent" surgery - next appt Oct  Small - lens specialist - f/u next week  Jason - valve specialist - did "valve" surgery - will be seeing Dr. Wells instead for this also in Oct     C/o B neck shoulder arm pain - hands "stable"     MRI Pelvis completed w [facility] 7/24/24 - has f/u appt w Dr. Worthington next week     Denies lightheadedness - sometimes feels weak and will check BP then - heart rate has been better      Would prefer not to have so many medical appointments (including w eye specialists)     PHQ-4 Score: 4     Hosp/ED/Urgent Care:    Recent appointments:     Upcoming appointments:   Future Appointments       Date Provider Specialty Appt Notes    11/4/2024 Jo-Ann Jacobo PA-C Cardiology 4 month    11/13/2024 Toi Kelly MD Cardiology 6 month    11/19/2024 Leeroy Zepeda MD Neurology Cogwheel rigidity [R29.898]    12/3/2024 Heather Miller NP Palliative Medicine [facility] mth [facility]    1/3/2025 Phylicia Deleon MD Primary Care F/U          10/28/24 Opthal dany tissue transplant  11/11/24 Ophthal Jl glaucoma    The following were reviewed: Active problem list, medication list, allergies, family history, social history, and Health Maintenance.     Medications have been reviewed and reconciled with patient at visit today.    Exam:  Vitals:    10/16/24 1030   BP: 104/60   Pulse: (!) 116   Temp: 96.9 °F (36.1 °C)     Weight: 82.6 kg (181 lb 15.8 oz)   Body mass index is 31.24 kg/m².    BP Readings from Last 3 Encounters:   10/16/24 104/60   09/04/24 123/62   08/07/24 104/71        Wt Readings from Last 3 Encounters:   10/16/24 1030 82.6 kg (181 lb 15.8 oz)   09/04/24 0936 80.2 kg (176 lb 12.9 oz)   08/07/24 1126 81 kg (178 lb 9.2 oz)        Physical Exam     Physical Exam    Cardiovascular: Heart rate irregular and fast.  Extremities: No " "edema in legs.          Laboratory Reviewed  Lab Results   Component Value Date    WBC 7.07 09/23/2024    HGB 13.1 09/23/2024    HCT 39.9 09/23/2024     09/23/2024     (H) 09/23/2024    CHOL 170 08/04/2023    TRIG 116 08/04/2023    HDL 40 08/04/2023    LDLCALC 106.8 08/04/2023    ALT 13 09/23/2024    AST 19 09/23/2024     09/23/2024    K 3.9 09/23/2024     09/23/2024    CREATININE 2.0 (H) 09/23/2024    BUN 44 (H) 09/23/2024    CO2 29 09/23/2024    MG 2.7 (H) 05/20/2024    TSH 3.577 04/27/2023    FREET4 1.03 04/27/2023    INR 1.1 10/25/2022    HGBA1C 6.9 (H) 09/23/2024    CRP 2.1 10/30/2023     Lab Results   Component Value Date    .2 (H) 04/27/2023    CALCIUM 9.4 09/23/2024    PHOS 4.0 01/08/2024      Lab Results   Component Value Date    DREXXISL45 334 09/23/2024     Lab Results   Component Value Date    FOLATE 13.5 09/23/2024    No results found for: "UIBC", "IRON", "TRANS", "TRANSFERRIN", "TIBC", "LABIRON", "FESATURATED"   Lab Results   Component Value Date    EGFRNORACEVR 23.2 (A) 09/23/2024    ALBUMIN 3.7 09/23/2024     (H) 04/27/2023     Lab Results   Component Value Date    ZUBJYSAS46UK 51 11/13/2017      MRI Pelvis/Sacrum 7/24/24 The bony structures of the pelvis are intact.  There is asymmetric subcutaneous soft tissue signal in the left buttock extending D into the pelvis near the posterior ischial tuberosity extending along the posterior aspect of the left hip joint.  No joint fluid or joint involvement is noted.  Inflammatory or infectious cellulitis is suspected. No definite mass or focal fluid collection is seen. The SI joints are intact.   IMPRESSION: Abnormal, asymmetric soft tissue signal in the deep soft tissues of the posterior left hemipelvis.  Inflammatory/infectious etiology is suspected.  No gross bone or left hip joint involvement is noted.  No focal mass or fluid collection is noted.     Per Ander 8/07/24  Reviewed MRI with the patient today. Her " MRI shows an area of soft tissue inflammation, but no discrete soft tissue mass.  It appears to be more of a hot spot where there is increased pressure underneath her ischium.  There is some bilateral fluid signal around her hamstring insertions, but tendons appear intact.    Assessment:   89 y.o. female with multiple co-morbid illnesses here for continued follow up of medical problems.      The primary encounter diagnosis was Chronic open angle glaucoma of both eyes, unspecified glaucoma stage. 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      Plan:   1. Chronic open angle glaucoma of both eyes, unspecified glaucoma stage    2. Persistent atrial fibrillation    3. 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 History of cornea transplant    6. Type 2 diabetes mellitus with stage 4 chronic kidney disease, without long-term current use of insulin  -     Microalbumin/creatinine urine ratio    Other orders  -     metOLazone (ZAROXOLYN) 2.5 MG tablet; Take 1 tablet (2.5 mg total) by mouth as needed (aprx once weekly).  Dispense: 12 tablet; Refill: 3         Health Maintenance         Date Due Completion Date    Shingles Vaccine (1 of 2) Never done ---    TETANUS VACCINE 11/09/2021 11/9/2011    Diabetes Urine Screening 03/17/2024 3/17/2023    Lipid Panel 08/04/2024 8/4/2023    Influenza Vaccine (1) 09/01/2024 10/4/2023    COVID-19 Vaccine (6 - 2024-25 season) 09/13/2024 7/19/2024    Hemoglobin A1c 03/23/2025 9/23/2024    Eye Exam 08/05/2025 8/5/2024 (Done)    Override on 8/5/2024: Done (Dr Prescott)          -Patient's lab results were reviewed and discussed with patient  -Treatment options and alternatives were discussed with the  patient. Patient expressed understanding. Patient was given the opportunity to ask questions and be an active participant in their medical care. Patient had no further questions or concerns at this time.     Follow up: Follow up in about 8 weeks (around 12/9/2024) for Follow Up w me M/W/F.    Care Plan/Goals: Reviewed {YES NO:65812}   Goals    None         After visit summary printed and given to patient upon discharge.  Patient goals and care plan are included in After visit summary.    TOTAL TIME evaluating and managing this patient for this encounter was *** minutes. This time was spent personally by me on some of the following activities: review of patient's past medical history, assessing age-appropriate health maintenance needs, review of any interval history, review and interpretation of lab results, review and interpretation of imaging test results, review and interpretation of cardiology test results, reviewing consulting specialist notes, obtaining history from the patient and family, examination of the patient, medication reconciliation, managing and/or ordering prescription medications, ordering imaging tests, ordering referral to subspecialty provider(s), educating patient and answering their questions about diagnosis, treatment plan, and goals of treatment, discussing planned follow-up and final documentation of the visit. This time was exclusive of any separately billable procedures for this patient and exclusive of time spent treating any other patients.     This note was generated with the assistance of ambient listening technology. Verbal consent was obtained by the patient and accompanying visitor(s) for the recording of patient appointment to facilitate this note. I attest to having reviewed and edited the generated note for accuracy, though some syntax or spelling errors may persist. Please contact the author of this note for any clarification.

## 2024-10-16 NOTE — PATIENT INSTRUCTIONS
If you are feeling unwell, we'd like to be the first ones to know here at [facility]! Please give us a call. Same day appointments are our top priority to keep you well and out of the emergency rooms and hospitals. Call [phone] for our direct line. After hours advice is always available. Please call [phone] after hours to speak to the on-call team.      If continued left posterior pain recommend schedule f/u appt w Dr. Worthington

## 2024-10-17 ENCOUNTER — TELEPHONE (OUTPATIENT)
Dept: PRIMARY CARE CLINIC | Facility: CLINIC | Age: 89
End: 2024-10-17
Payer: MEDICARE

## 2024-10-17 NOTE — TELEPHONE ENCOUNTER
Pt reviewed labs via [email or web address]        ----- Message from Phylicia Deleon MD sent at 10/17/2024 10:28 AM CDT -----  Pt has [facility] - if message below not viewed please call w information below:   There is some protein in the urine but improved from last year.    Please message or call with any questions or concerns!  Thank you!  Phylicia Deleon MD, MPH  [facility]

## 2024-10-17 NOTE — PROGRESS NOTES
Pt has [facility] - if message below not viewed please call w information below:   There is some protein in the urine but improved from last year.    Please message or call with any questions or concerns!  Thank you!  Phylicia Deleon MD, MPH  [facility]

## 2024-11-04 ENCOUNTER — HOSPITAL ENCOUNTER (OUTPATIENT)
Dept: CARDIOLOGY | Facility: HOSPITAL | Age: 89
Discharge: HOME | End: 2024-11-04
Payer: MEDICARE

## 2024-11-04 ENCOUNTER — OFFICE VISIT (OUTPATIENT)
Dept: CARDIOLOGY | Facility: CLINIC | Age: 89
End: 2024-11-04
Payer: MEDICARE

## 2024-11-04 VITALS
HEART RATE: 117 BPM | DIASTOLIC BLOOD PRESSURE: 78 MMHG | SYSTOLIC BLOOD PRESSURE: 132 MMHG | OXYGEN SATURATION: 97 % | BODY MASS INDEX: 30.83 KG/M2 | WEIGHT: 180.56 LBS | HEIGHT: 64 IN

## 2024-11-04 DIAGNOSIS — I13.2 HYPERTENSIVE HEART AND RENAL DISEASE WITH BOTH (CONGESTIVE) HEART FAILURE AND RENAL FAILURE: Chronic | ICD-10-CM

## 2024-11-04 DIAGNOSIS — I10 PRIMARY HYPERTENSION: Chronic | ICD-10-CM

## 2024-11-04 DIAGNOSIS — E66.811 OBESITY (BMI 30.0-34.9): ICD-10-CM

## 2024-11-04 DIAGNOSIS — I65.23 CAROTID ARTERY CALCIFICATION, BILATERAL: ICD-10-CM

## 2024-11-04 DIAGNOSIS — Z78.9 STATIN INTOLERANCE: Chronic | ICD-10-CM

## 2024-11-04 DIAGNOSIS — Z79.01 CHRONIC ANTICOAGULATION: ICD-10-CM

## 2024-11-04 DIAGNOSIS — I48.91 ATRIAL FIBRILLATION WITH RVR: Primary | Chronic | ICD-10-CM

## 2024-11-04 DIAGNOSIS — E11.22 TYPE 2 DIABETES MELLITUS WITH STAGE 4 CHRONIC KIDNEY DISEASE, WITHOUT LONG-TERM CURRENT USE OF INSULIN: Chronic | ICD-10-CM

## 2024-11-04 DIAGNOSIS — N19 HYPERTENSIVE HEART AND RENAL DISEASE WITH BOTH (CONGESTIVE) HEART FAILURE AND RENAL FAILURE: Chronic | ICD-10-CM

## 2024-11-04 DIAGNOSIS — I10 PRIMARY HYPERTENSION: Primary | Chronic | ICD-10-CM

## 2024-11-04 DIAGNOSIS — I73.9 PAD (PERIPHERAL ARTERY DISEASE): ICD-10-CM

## 2024-11-04 DIAGNOSIS — E78.2 MIXED HYPERLIPIDEMIA: ICD-10-CM

## 2024-11-04 DIAGNOSIS — N18.4 TYPE 2 DIABETES MELLITUS WITH STAGE 4 CHRONIC KIDNEY DISEASE, WITHOUT LONG-TERM CURRENT USE OF INSULIN: Chronic | ICD-10-CM

## 2024-11-04 PROCEDURE — 93005 ELECTROCARDIOGRAM TRACING: CPT

## 2024-11-04 PROCEDURE — 93010 ELECTROCARDIOGRAM REPORT: CPT | Mod: HCNC,,, | Performed by: STUDENT IN AN ORGANIZED HEALTH CARE EDUCATION/TRAINING PROGRAM

## 2024-11-04 PROCEDURE — 99999 PR PBB SHADOW E&M-EST. PATIENT-LVL IV: CPT | Mod: PBBFAC,,, | Performed by: PHYSICIAN ASSISTANT

## 2024-11-04 PROCEDURE — 99214 OFFICE O/P EST MOD 30 MIN: CPT | Mod: S$PBB,HCNC,, | Performed by: PHYSICIAN ASSISTANT

## 2024-11-04 RX ORDER — PREDNISOLONE ACETATE 10 MG/ML
1 SUSPENSION/ DROPS OPHTHALMIC 4 TIMES DAILY
COMMUNITY

## 2024-11-04 NOTE — PROGRESS NOTES
Subjective   Patient ID:  Shirley Metz is a 89 y.o. female who presents for follow-up of afib, hypotension      HPI  Ms. Sg Metz is a 89 year old female patient whose current medical conditions include afib/aflutter s/p prior PVI (on Eliquis) and failed STACEY/DCCV 3/23 (did not maintain SR), glaucoma, chronic diastolic CHF, PAD, carotid artery disease, and CKD who presents today for follow-up. Patient previously had a bout of hypotension/weakness. Lasted for about a week, has since improved. Systolic BP seems to be running around 115 at home. CV wise, seems stable. No jose alfredo CP, heaviness, or tightness. No unusual SOB/RUIZ. Some mild BLE edema, uses metolazone prn.  No recent bouts of LH, dizziness, palpitations, near syncope, or syncope. BP ok today in office. Patient is compliant with her medications, uses metolazone about once weekly. Still dealing with a lot of eye problems/issues, followed by ophthalmology.     EF by TTE 3/23 showed normal EF, patent PFO.     Recent labs reviewed. Creatinine stable at 2.0. No anemia.     EKG today shows EKG atrial flutter, no acute ischemic changes.      Review of Systems   Constitutional: Positive for malaise/fatigue (occasional bouts of weakness). Negative for chills, decreased appetite and fever.   HENT:  Negative for congestion, hoarse voice and sore throat.    Eyes:  Positive for double vision (chronic). Negative for blurred vision and discharge.   Cardiovascular:  Negative for chest pain, claudication, cyanosis, dyspnea on exertion, irregular heartbeat, leg swelling, near-syncope, orthopnea, palpitations and paroxysmal nocturnal dyspnea.   Respiratory:  Negative for cough, hemoptysis, shortness of breath, snoring, sputum production and wheezing.    Endocrine: Negative for cold intolerance and heat intolerance.   Hematologic/Lymphatic: Negative for bleeding problem. Bruises/bleeds easily.   Skin:  Negative for rash.   Musculoskeletal:  Positive for  arthritis. Negative for back pain, joint pain, joint swelling, muscle cramps, muscle weakness and myalgias.   Gastrointestinal:  Negative for abdominal pain, constipation, diarrhea, heartburn, melena and nausea.   Genitourinary:  Negative for hematuria.   Neurological:  Negative for dizziness, focal weakness, headaches, light-headedness, loss of balance, numbness, paresthesias, seizures and weakness.   Psychiatric/Behavioral:  Negative for memory loss. The patient does not have insomnia.    Allergic/Immunologic: Negative for hives.          Objective     Physical Exam  Vitals and nursing note reviewed.   Constitutional:       General: She is not in acute distress.     Appearance: Normal appearance. She is well-developed. She is not diaphoretic.   HENT:      Head: Normocephalic and atraumatic.   Eyes:      General:         Right eye: No discharge.         Left eye: No discharge.      Pupils: Pupils are equal, round, and reactive to light.   Neck:      Thyroid: No thyromegaly.      Vascular: No JVD.      Trachea: No tracheal deviation.   Cardiovascular:      Rate and Rhythm: Normal rate. Rhythm regularly irregular.      Heart sounds: S1 normal and S2 normal. No murmur heard.  Pulmonary:      Effort: Pulmonary effort is normal. No respiratory distress.      Breath sounds: Normal breath sounds.   Musculoskeletal:      Cervical back: Neck supple.      Right lower leg: No edema.      Left lower leg: No edema.   Skin:     General: Skin is warm and dry.      Findings: No erythema.   Neurological:      Mental Status: She is alert and oriented to person, place, and time.   Psychiatric:         Mood and Affect: Mood normal.         Behavior: Behavior normal.         Thought Content: Thought content normal.       TTE 3/21/2023 Results  summary  Show Result ComparisonThe left ventricle is normal in size with normal systolic function.  Atrial fibrillation observed.  Normal right ventricular size with normal right ventricular  systolic function.  There is a patent foramen ovale present.  Normal appearing left atrial appendage. No thrombus is present in the appendage. Abnormal appendage velocities.  Right atrial enlargement.  Moderate mitral regurgitation.  Mild tricuspid regurgitation.  Plaque present in the descending aorta.  The estimated ejection fraction is 55%.  A 200 J synchronized cardioversion was successfully performed with restoration of normal sinus rhythm.      Chemistry        Component Value Date/Time     09/23/2024 1051    K 3.9 09/23/2024 1051     09/23/2024 1051    CO2 29 09/23/2024 1051    BUN 44 (H) 09/23/2024 1051    CREATININE 2.0 (H) 09/23/2024 1051     (H) 09/23/2024 1051        Component Value Date/Time    CALCIUM 9.4 09/23/2024 1051    ALKPHOS 70 09/23/2024 1051    AST 19 09/23/2024 1051    ALT 13 09/23/2024 1051    BILITOT 0.6 09/23/2024 1051    ESTGFRAFRICA 30 (A) 07/26/2022 1348    EGFRNONAA 26 (A) 07/26/2022 1348        Lab Results   Component Value Date    WBC 7.07 09/23/2024    HGB 13.1 09/23/2024    HCT 39.9 09/23/2024     (H) 09/23/2024     09/23/2024            Assessment and Plan     1. Atrial fibrillation with RVR    2. Carotid artery calcification, bilateral    3. Primary hypertension    4. Mixed hyperlipidemia    5. PAD (peripheral artery disease)    6. Chronic anticoagulation    7. Obesity (BMI 30.0-34.9)    8. Type 2 diabetes mellitus with stage 4 chronic kidney disease, without long-term current use of insulin    9. Statin intolerance      Patient presents for f/u. Doing clinically well. Hypotension resolved. Continue same CV meds/mgmt. Can utilize midodrine prn. Monitor BP at home. Discussed repeat TTE if BP issues recur.  Plan:  -Continue current CV meds/mgmt  -Cardiac low salt diet  -Monitor BP at home, midodrine prn  -Consider repeat TTE  -F/u with PA in January  -Recent labs reviewed/discussed  -

## 2024-11-05 LAB
OHS QRS DURATION: 74 MS
OHS QTC CALCULATION: 474 MS

## 2024-11-10 NOTE — ASSESSMENT & PLAN NOTE
Multiple medication intolerances - followed by cardiology and cardiac arrhythmia specialist Robin go appts 11/04 and 11/13

## 2024-11-11 ENCOUNTER — PATIENT MESSAGE (OUTPATIENT)
Dept: CARDIOLOGY | Facility: CLINIC | Age: 89
End: 2024-11-11
Payer: MEDICARE

## 2024-11-11 DIAGNOSIS — F41.9 ANXIETY: ICD-10-CM

## 2024-11-12 NOTE — TELEPHONE ENCOUNTER
"----- Message from Phylicia Deleon MD sent at 11/10/2024 12:05 PM CST -----  Regarding: phone call f/u  Pt "worry score 3" but wants fewer medical appointments - please call one day this week to see how things went w her eye doctors and w Cardiology and to remind her of appt w Neurology next week: 11/19.    Please add to RNCM list for monthly phone call - next would be in Dec since her next clinic f/u w me is now not until Jan - TY!  "

## 2024-11-12 NOTE — TELEPHONE ENCOUNTER
"My chart message re: message below sent to pt, [facility] number provided to give pt the option of one directional or real time communication.    ----- Message from Phylicia Deleon MD sent at 11/10/2024 12:05 PM CST -----  Regarding: phone call f/u  Pt "worry score 3" but wants fewer medical appointments - please call one day this week to see how things went w her eye doctors and w Cardiology and to remind her of appt w Neurology next week: 11/19.    Please add to RNCM list for monthly phone call - next would be in Dec since her next clinic f/u w me is now not until Jan - TY!  "

## 2024-11-13 ENCOUNTER — OFFICE VISIT (OUTPATIENT)
Dept: CARDIOLOGY | Facility: CLINIC | Age: 89
End: 2024-11-13
Payer: MEDICARE

## 2024-11-13 VITALS
HEIGHT: 64 IN | RESPIRATION RATE: 16 BRPM | BODY MASS INDEX: 30.66 KG/M2 | HEART RATE: 122 BPM | OXYGEN SATURATION: 96 % | DIASTOLIC BLOOD PRESSURE: 72 MMHG | WEIGHT: 179.56 LBS | SYSTOLIC BLOOD PRESSURE: 113 MMHG

## 2024-11-13 DIAGNOSIS — I48.19 PERSISTENT ATRIAL FIBRILLATION: Chronic | ICD-10-CM

## 2024-11-13 DIAGNOSIS — I48.92 ATRIAL FLUTTER WITH RAPID VENTRICULAR RESPONSE: Primary | ICD-10-CM

## 2024-11-13 PROCEDURE — 99999 PR PBB SHADOW E&M-EST. PATIENT-LVL IV: CPT | Mod: PBBFAC,HCNC,, | Performed by: INTERNAL MEDICINE

## 2024-11-13 PROCEDURE — 99214 OFFICE O/P EST MOD 30 MIN: CPT | Mod: HCNC,S$GLB,, | Performed by: INTERNAL MEDICINE

## 2024-11-13 PROCEDURE — 3288F FALL RISK ASSESSMENT DOCD: CPT | Mod: HCNC,CPTII,S$GLB, | Performed by: INTERNAL MEDICINE

## 2024-11-13 PROCEDURE — 1101F PT FALLS ASSESS-DOCD LE1/YR: CPT | Mod: HCNC,CPTII,S$GLB, | Performed by: INTERNAL MEDICINE

## 2024-11-13 PROCEDURE — 1159F MED LIST DOCD IN RCRD: CPT | Mod: HCNC,CPTII,S$GLB, | Performed by: INTERNAL MEDICINE

## 2024-11-13 RX ORDER — ALPRAZOLAM 0.5 MG/1
0.5 TABLET ORAL 3 TIMES DAILY
COMMUNITY

## 2024-11-13 NOTE — PROGRESS NOTES
Subjective   Patient ID:  Shirley Metz is a 89 y.o. female who presents for follow-up of Atrial Fibrillation      89 yoF referred for AF management.     8/17/22: NSR on recent ECGs up until 6/15/22. AFL and AF on ECGs afterwards starting 7/11/22. Event monitor with 100% AF, controlled average V rate. Normal EF. She had AF after a new BP agent 10/19. Her symptoms are mild. She has tried amiodarone in the past which caused side effects. She has history of CAD and has QTc 450s.     9/22: AF 9/2/22 ECG. She continues to have symptomatic AF as well as symptoms on medication. She wishes to pursue ablation.     2/23: PVI/CTI 11/3/22. She had upper lip swelling after her PVI, suspected to be due to trauma from the STACEY/ET intubations. She had some complaints about the overnight care. She was in NSR on ECGs up until 1/5/23. 1/10/23 she was in atypical AFL. She takes lasix 40 mg qd and sometimes at night. Her AF episodes are few and far between based on her home BP and HR checks. She still complains of SOB. She has a lasix plan provided by the HF clinic.     3/23: Event monitor performed but not ready for assessment. She has continued HF symptoms. She is in AF today    5/23: AFL on event monitor 4/23 and on holter 5/23. Symptoms improved since her ablation with some minor swelling    11/23: Stable AFL with 2:1 conduction on 9/23 holter. Her symptoms are stable and improved since prior to her ablation.     5/8/24: AFL noted 11/23, 1/24 ECGs. Stable atypical AFL by symptoms and HR log. No syncope, near syncope.     Interval history: Continues with stable asymptomatic AFL with RVR at times.     Event monitor 9/23:  ·  Appears to have predominant Atrial flutter, Recommend to confirm with 12 leads ECG as there is baseline artifact  ·  Heart rates varied between 69 and 146 BPM with an average of 106 BPM.  ·  There were frequent PVCs totalling 2910 and averaging 60.65 per hour.  ·  The longest RR interval was 1700  msec.  ·  The longest run of SVT was at 128 bpm for 25 beats  ·  During her reproted symptoms aflutter with controlled rates    Event monitor 4/23:  · The patient complained of 1 episodes. The symptom(s) included shortness of breath. The corresponding rhythm to the patient reported event was atrial fibrillation.  · Patient Reported Event #2 Patient activated. The patient complained of 2 episodes. The corresponding rhythm to the patient reported event was atrial fibrillation. These symptoms were associated with PVCs.  · The total Afib burden was 100%.  · The patient was asymptomatic with atrial fibrillation.    Ablation 11/22:  ·  CTI ablation.  ·  Pulmonary vein isolation.  ·  Intracardiac echo.  ·  3D mapping performed with Ensite.    Echo 7/12/22:  · Concentric remodeling and normal systolic function.  · The estimated ejection fraction is 60%.  · Normal left ventricular diastolic function.  · Normal right ventricular size with normal right ventricular systolic function.    Event monitor 7/22:  - Heart rates varied between 41 and 123 BPM with an average of 77 BPM.  - Predominant rhythm: atrial fibrillation   - Atrial Fibrillation (AF) 100.0 %  - PVC 0.48 %    My interpretation of the ECG is:    Past Medical History:  01/11/2023: Acute on chronic diastolic congestive heart failure  11/29/2018: Anemia  11/28/2017: Anxiety  No date: Arthritis  10/09/2019: Atrial fibrillation with RVR  No date: Back pain  03/29/2018: Basal cell carcinoma      Comment:  left mid back  10/15/2019: Chronic diastolic congestive heart failure  08/08/2016: CKD (chronic kidney disease) stage 3, GFR 30-59 ml/min  No date: Colon polyps      Comment:  reported per patient.   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  No date: General anesthetics causing adverse effect in therapeutic use      Comment:  woke up during surgery and gagged on ET  tube  No date: Glaucoma  No date: Glaucoma  07/24/2018: Heart failure with preserved left ventricular function   (HFpEF)  No date: Hyperlipidemia  No date: Hypertension  dry: Macular degeneration  05/03/2017: Medication side effects  No date: Obesity  12/04/2017: Pre-diabetes  04/26/2021: PVD (peripheral vascular disease)  04/26/2021: PVD (peripheral vascular disease)  01/08/2019: Squamous cell carcinoma      Comment:  left shoulder  10/05/2021: Stenosis of carotid artery  01/11/2023: Troponin level elevated  No date: Trouble in sleeping  02/24/2021: Type 2 diabetes mellitus without complication, without   long-term current use of insulin  No date: Urinary tract infection    Past Surgical History:  11/03/2022: ABLATION OF ARRHYTHMOGENIC FOCUS FOR ATRIAL FIBRILLATION;   N/A      Comment:  Procedure: Ablation atrial fibrillation;  Surgeon:                Toi Kelly MD;  Location: [facility] EP LAB;  Service:               Cardiology;  Laterality: N/A;  afib, PVI/CTI, RFA, STACEY                (cx if SR), DEDE, ABDIEL orantes, 3 Prep  1938: ADENOIDECTOMY  No date: BREAST BIOPSY; Left      Comment:  1997. benign  1997 approx: BREAST CYST EXCISION; Left  2012 approx: CARPAL TUNNEL RELEASE; Right  1994: CATARACT EXTRACTION; Bilateral  1975: CHOLECYSTECTOMY  08/2015: CORNEAL TRANSPLANT; Bilateral      Comment:  8/2015 - left; Right 4/2016 12/06/2023: EYE SURGERY      Comment:  Fuch's Corneal dystrophy - had 2nd operation with Dr. Quintanilla  No date: EYE SURGERY      Comment:  Cataract wIOL  2011: left thumb; Left      Comment:  removed cuboid for arthritis  08/21/2018: REVERSE TOTAL SHOULDER ARTHROPLASTY; Left      Comment:  Procedure: ARTHROPLASTY, SHOULDER, TOTAL, REVERSE;                 Surgeon: Solomon Lujan MD;  Location: [facility];                 Service: Orthopedics;  Laterality: Left;  WITH BICEP                TENODESIS  2017: SKIN CANCER EXCISION; Left      Comment:  BCC removal by   Valadez  05/11/2011: SLT- OS (aka TRABECULOPLASTY); Left      Comment:  repeat 3/14/12  08/21/2018: TENOTOMY; Left      Comment:  Procedure: TENOTOMY;  Surgeon: Solomon Lujan MD;                 Location: [facility] OR;  Service: Orthopedics;  Laterality:                Left;  1938: TONSILLECTOMY  03/21/2023: TRANSESOPHAGEAL ECHOCARDIOGRAM WITH POSSIBLE   CARDIOVERSION (STACEY W/ POSS CARDIOVERSION); N/A      Comment:  Procedure: Transesophageal echo (STACEY) intra-procedure                log documentation;  Surgeon: Trevon Ramirez MD;  Location:                [facility] CATH LAB;  Service: Cardiology;  Laterality: N/A;  10/10/2019: TREATMENT OF CARDIAC ARRHYTHMIA; N/A      Comment:  Procedure: CARDIOVERSION;  Surgeon: Pete Durán MD;                Location: [facility] CATH LAB;  Service: Cardiology;                 Laterality: N/A;  12/06/2019: TREATMENT OF CARDIAC ARRHYTHMIA; N/A      Comment:  Procedure: CARDIOVERSION;  Surgeon: Samy Castillo MD;                 Location: [facility] CATH LAB;  Service: Cardiology;                 Laterality: N/A;    Social History    Socioeconomic History      Marital status:       Number of children: 3    Tobacco Use      Smoking status: Never      Smokeless tobacco: Never      Tobacco comments: history of passive smoking from her ex-    Substance and Sexual Activity      Alcohol use: Yes        Alcohol/week: 2.0 standard drinks of alcohol        Types: 2 Standard drinks or equivalent per week        Comment: occ      Drug use: No      Sexual activity: Not Currently        Partners: Male        Birth control/protection: Post-menopausal        Comment: discussed protection for STD's    Other Topics      Concerns:        Are you pregnant or think you may be?: No        Breast-feeding: No    Social History Narrative       x 2,  x 1. 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 Still drives. Does have a Living Will.    Social Drivers of Health  Financial Resource Strain: Low Risk  (7/30/2024)      Overall Financial Resource Strain (CARDIA)          Difficulty of Paying Living Expenses: Not hard at all  Food Insecurity: No Food Insecurity (7/30/2024)      Hunger Vital Sign          Worried About Running Out of Food in the Last Year: Never true          Ran Out of Food in the Last Year: Never true  Transportation Needs: No Transportation Needs (7/30/2024)      TRANSPORTATION NEEDS          Transportation : No  Physical Activity: Inactive (7/30/2024)      Exercise Vital Sign          Days of Exercise per Week: 0 days          Minutes of Exercise per Session: 0 min  Stress: No Stress Concern Present (7/30/2024)      [employer] - Occupational Stress Questionnaire          Feeling of Stress : Not at all  Housing Stability: Low Risk  (7/30/2024)      Housing Stability Vital Sign          Unable to Pay for Housing in the Last Year: No          Homeless in the Last Year: No    Review of patient's family history indicates:  Problem: Heart disease      Relation: Mother          Name:               Age of Onset: (Not Specified)  Problem: Hypertension      Relation: Mother          Name:               Age of Onset: (Not Specified)  Problem: Cancer      Relation: Father          Name:               Age of Onset: 88              Comment: sarcoma( ?K[place]osi's ) on leg  Problem: Deep vein thrombosis      Relation: Neg Hx          Name:               Age of Onset: (Not Specified)  Problem: Ovarian cancer      Relation: Neg Hx          Name:               Age of Onset: (Not Specified)  Problem: Breast cancer      Relation: Neg Hx          Name:               Age of Onset: (Not Specified)  Problem: Kidney disease      Relation: Neg Hx          Name:               Age of Onset: (Not Specified)  Problem: Strabismus      Relation:  Neg Hx          Name:               Age of Onset: (Not Specified)  Problem: Retinal detachment      Relation: Neg Hx          Name:               Age of Onset: (Not Specified)  Problem: Macular degeneration      Relation: Neg Hx          Name:               Age of Onset: (Not Specified)  Problem: Glaucoma      Relation: Neg Hx          Name:               Age of Onset: (Not Specified)  Problem: Blindness      Relation: Neg Hx          Name:               Age of Onset: (Not Specified)  Problem: Amblyopia      Relation: Neg Hx          Name:               Age of Onset: (Not Specified)  Problem: Eczema      Relation: Neg Hx          Name:               Age of Onset: (Not Specified)  Problem: Lupus      Relation: Neg Hx          Name:               Age of Onset: (Not Specified)  Problem: Melanoma      Relation: Neg Hx          Name:               Age of Onset: (Not Specified)  Problem: Psoriasis      Relation: Neg Hx          Name:               Age of Onset: (Not Specified)  Problem: Diabetes      Relation: Neg Hx          Name:               Age of Onset: (Not Specified)  Problem: Stroke      Relation: Neg Hx          Name:               Age of Onset: (Not Specified)  Problem: Intellectual disability      Relation: Neg Hx          Name:               Age of Onset: (Not Specified)  Problem: Mental illness      Relation: Neg Hx          Name:               Age of Onset: (Not Specified)  Problem: Hyperlipidemia      Relation: Neg Hx          Name:               Age of Onset: (Not Specified)  Problem: COPD      Relation: Neg Hx          Name:               Age of Onset: (Not Specified)  Problem: Asthma      Relation: Neg Hx          Name:               Age of Onset: (Not Specified)  Problem: Depression      Relation: Neg Hx          Name:               Age of Onset: (Not Specified)  Problem: Alcohol abuse      Relation: Neg Hx          Name:               Age of Onset: (Not Specified)  Problem: Drug abuse      Relation:  Neg Hx          Name:               Age of Onset: (Not Specified)      Current Outpatient Medications:  acetaminophen (TYLENOL) 500 MG tablet, Take 500 mg by mouth nightly as needed., Disp: , Rfl:   ALPRAZolam (XANAX) 0.5 MG tablet, Take 1 tablet (0.5 mg total) by mouth nightly as needed for Anxiety., Disp: 30 tablet, Rfl: 2  b complex vitamins capsule, Take 1 capsule by mouth once daily., Disp: , Rfl:   calcium carbonate/vitamin D3 (VITAMIN D-3 ORAL), Take 4,000 Int'l Units/day by mouth once daily., Disp: , Rfl:   ELIQUIS 2.5 mg Tab, TAKE 1 TABLET (2.5 MG TOTAL) BY MOUTH 2 (TWO) TIMES DAILY., Disp: 180 tablet, Rfl: 2  furosemide (LASIX) 40 MG tablet, TAKE 1 TABLET (40 MG TOTAL) BY MOUTH 2 TIMES A DAY. (Patient taking differently: 40mg in AM and 20 in PM), Disp: 180 tablet, Rfl: 0  latanoprostene bunod (VYZULTA) 0.024 % Drop, Place 1 drop into both eyes every evening., Disp: 2.5 mL, Rfl: 12  metOLazone (ZAROXOLYN) 2.5 MG tablet, Take 1 tablet (2.5 mg total) by mouth as needed (aprx once weekly)., Disp: 12 tablet, Rfl: 3  metoprolol tartrate (LOPRESSOR) 25 MG tablet, TAKE 3 TABLETS (75 MG TOTAL) BY MOUTH 2 (TWO) TIMES DAILY., Disp: 180 tablet, Rfl: 2  midodrine (PROAMATINE) 2.5 MG Tab, Take 1 tablet (2.5 mg total) by mouth 3 (three) times daily as needed (low BP)., Disp: 90 tablet, Rfl: 11  prednisoLONE acetate (PRED FORTE) 1 % DrpS, 1 drop 4 (four) times daily., Disp: , Rfl:     No current facility-administered medications for this visit.          Review of Systems   Constitutional: Negative. Negative for diaphoresis, malaise/fatigue and weight gain.   HENT: Negative.  Negative for hoarse voice.    Eyes: Negative.  Negative for double vision and visual disturbance.   Cardiovascular:  Positive for irregular heartbeat. Negative for chest pain, claudication, cyanosis, dyspnea on exertion, leg swelling, near-syncope, orthopnea, palpitations, paroxysmal nocturnal dyspnea and syncope.   Respiratory: Negative.  Negative  for cough, shortness of breath and snoring.    Endocrine: Negative.    Hematologic/Lymphatic: Negative.  Negative for bleeding problem. Does not bruise/bleed easily.   Skin: Negative.  Negative for color change and poor wound healing.   Musculoskeletal: Negative.  Negative for muscle cramps, muscle weakness and myalgias.   Gastrointestinal: Negative.  Negative for bloating, abdominal pain, change in bowel habit, diarrhea, heartburn, hematemesis, hematochezia, melena and nausea.   Genitourinary: Negative.    Neurological: Negative.  Negative for excessive daytime sleepiness, dizziness, headaches, light-headedness, loss of balance, numbness and weakness.        HAS HOTTNESS IN BOTH FEET AND ARMS AND HANDS.    Psychiatric/Behavioral: Negative.  Negative for memory loss. The patient does not have insomnia.    Allergic/Immunologic: Negative.  Negative for hives.          Objective     Physical Exam  Vitals and nursing note reviewed.   Constitutional:       General: She is not in acute distress.     Appearance: She is well-developed. She is not diaphoretic.   HENT:      Head: Normocephalic and atraumatic.      Nose: Nose normal.   Eyes:      General: No scleral icterus.     Conjunctiva/sclera: Conjunctivae normal.   Neck:      Thyroid: No thyromegaly.      Vascular: No JVD.   Cardiovascular:      Rate and Rhythm: Tachycardia present. Rhythm irregular.      Heart sounds: S1 normal and S2 normal. No murmur heard.     No friction rub. No gallop. No S3 or S4 sounds.   Pulmonary:      Effort: Pulmonary effort is normal. No respiratory distress.      Breath sounds: Normal breath sounds. No stridor. No wheezing or rales.   Chest:      Chest wall: No tenderness.   Abdominal:      General: Bowel sounds are normal. There is no distension.      Palpations: Abdomen is soft. There is no mass.      Tenderness: There is no abdominal tenderness. There is no rebound.   Genitourinary:     Comments: Deferred  Musculoskeletal:          General: No tenderness or deformity. Normal range of motion.      Cervical back: Normal range of motion and neck supple.   Lymphadenopathy:      Cervical: No cervical adenopathy.   Skin:     General: Skin is warm and dry.      Coloration: Skin is not pale.      Findings: No erythema or rash.   Neurological:      Mental Status: She is alert and oriented to person, place, and time.      Motor: No abnormal muscle tone.      Coordination: Coordination normal.   Psychiatric:         Behavior: Behavior normal.         Thought Content: Thought content normal.         Judgment: Judgment normal.            Assessment and Plan     1. Atrial flutter with rapid ventricular response    2. Persistent atrial fibrillation        Plan:  89 yoF here for arrhythmia management. Stable atypical AFL. Remains on metoprolol 75 mg bid and eliquis. RTC 1y

## 2024-11-14 RX ORDER — ALPRAZOLAM 0.5 MG/1
0.5 TABLET ORAL NIGHTLY PRN
Qty: 30 TABLET | Refills: 1 | Status: SHIPPED | OUTPATIENT
Start: 2024-11-14 | End: 2025-02-12

## 2024-11-19 ENCOUNTER — LAB VISIT (OUTPATIENT)
Dept: LAB | Facility: HOSPITAL | Age: 89
End: 2024-11-19
Attending: PSYCHIATRY & NEUROLOGY
Payer: MEDICARE

## 2024-11-19 ENCOUNTER — OFFICE VISIT (OUTPATIENT)
Dept: NEUROLOGY | Facility: CLINIC | Age: 89
End: 2024-11-19
Payer: MEDICARE

## 2024-11-19 VITALS
HEART RATE: 116 BPM | BODY MASS INDEX: 30.48 KG/M2 | SYSTOLIC BLOOD PRESSURE: 114 MMHG | WEIGHT: 178.56 LBS | DIASTOLIC BLOOD PRESSURE: 72 MMHG | HEIGHT: 64 IN | RESPIRATION RATE: 18 BRPM

## 2024-11-19 DIAGNOSIS — I10 PRIMARY HYPERTENSION: Chronic | ICD-10-CM

## 2024-11-19 DIAGNOSIS — I48.91 ATRIAL FIBRILLATION WITH RVR: Chronic | ICD-10-CM

## 2024-11-19 DIAGNOSIS — I65.29 STENOSIS OF CAROTID ARTERY, UNSPECIFIED LATERALITY: ICD-10-CM

## 2024-11-19 DIAGNOSIS — R53.1 WEAKNESS: Primary | ICD-10-CM

## 2024-11-19 DIAGNOSIS — R79.89 ELEVATED SERUM CREATININE: ICD-10-CM

## 2024-11-19 DIAGNOSIS — K40.21 BILATERAL RECURRENT INGUINAL HERNIA WITHOUT OBSTRUCTION OR GANGRENE: ICD-10-CM

## 2024-11-19 DIAGNOSIS — J84.10 PULMONARY GRANULOMA: ICD-10-CM

## 2024-11-19 DIAGNOSIS — I13.2 HYPERTENSIVE HEART AND RENAL DISEASE WITH BOTH (CONGESTIVE) HEART FAILURE AND RENAL FAILURE: Chronic | ICD-10-CM

## 2024-11-19 DIAGNOSIS — Z96.1 LENS REPLACED BY OTHER MEANS: ICD-10-CM

## 2024-11-19 DIAGNOSIS — R09.89 UNEQUAL BLOOD PRESSURES IN PAIRED EXTREMITIES: ICD-10-CM

## 2024-11-19 DIAGNOSIS — R31.29 OTHER MICROSCOPIC HEMATURIA: ICD-10-CM

## 2024-11-19 DIAGNOSIS — G47.00 INSOMNIA, UNSPECIFIED TYPE: ICD-10-CM

## 2024-11-19 DIAGNOSIS — Z96.1 PSEUDOPHAKIA: ICD-10-CM

## 2024-11-19 DIAGNOSIS — J30.2 SEASONAL ALLERGIC RHINITIS, UNSPECIFIED TRIGGER: ICD-10-CM

## 2024-11-19 DIAGNOSIS — I70.90 ATHEROMA OF ARTERY: ICD-10-CM

## 2024-11-19 DIAGNOSIS — M79.10 MYALGIA: ICD-10-CM

## 2024-11-19 DIAGNOSIS — E21.3 HYPERPARATHYROIDISM: ICD-10-CM

## 2024-11-19 DIAGNOSIS — N18.4 CKD STAGE 4 SECONDARY TO HYPERTENSION: Chronic | ICD-10-CM

## 2024-11-19 DIAGNOSIS — N19 HYPERTENSIVE HEART AND RENAL DISEASE WITH BOTH (CONGESTIVE) HEART FAILURE AND RENAL FAILURE: Chronic | ICD-10-CM

## 2024-11-19 DIAGNOSIS — R53.1 WEAKNESS: ICD-10-CM

## 2024-11-19 DIAGNOSIS — N18.4 TYPE 2 DIABETES MELLITUS WITH STAGE 4 CHRONIC KIDNEY DISEASE, WITHOUT LONG-TERM CURRENT USE OF INSULIN: Chronic | ICD-10-CM

## 2024-11-19 DIAGNOSIS — I48.92 ATRIAL FLUTTER WITH RAPID VENTRICULAR RESPONSE: ICD-10-CM

## 2024-11-19 DIAGNOSIS — H40.1130 CHRONIC OPEN ANGLE GLAUCOMA OF BOTH EYES, UNSPECIFIED GLAUCOMA STAGE: Chronic | ICD-10-CM

## 2024-11-19 DIAGNOSIS — H16.223 KERATITIS SICCA, BILATERAL: Chronic | ICD-10-CM

## 2024-11-19 DIAGNOSIS — I70.0 CALCIFICATION OF AORTA: Chronic | ICD-10-CM

## 2024-11-19 DIAGNOSIS — E66.811 OBESITY (BMI 30.0-34.9): ICD-10-CM

## 2024-11-19 DIAGNOSIS — R94.31 ABNORMAL ECG: ICD-10-CM

## 2024-11-19 DIAGNOSIS — R26.9 ABNORMALITY OF GAIT AND MOBILITY: Chronic | ICD-10-CM

## 2024-11-19 DIAGNOSIS — I11.9 HYPERTENSIVE LEFT VENTRICULAR HYPERTROPHY, WITHOUT HEART FAILURE: Chronic | ICD-10-CM

## 2024-11-19 DIAGNOSIS — F41.9 ANXIETY: Chronic | ICD-10-CM

## 2024-11-19 DIAGNOSIS — H18.519 FUCHS' CORNEAL DYSTROPHY, UNSPECIFIED LATERALITY: Chronic | ICD-10-CM

## 2024-11-19 DIAGNOSIS — I51.3 THROMBUS OF LEFT ATRIAL APPENDAGE: ICD-10-CM

## 2024-11-19 DIAGNOSIS — I12.9 CKD STAGE 4 SECONDARY TO HYPERTENSION: Chronic | ICD-10-CM

## 2024-11-19 DIAGNOSIS — D75.89 MACROCYTOSIS WITHOUT ANEMIA: ICD-10-CM

## 2024-11-19 DIAGNOSIS — M47.816 LUMBAR SPONDYLOSIS: ICD-10-CM

## 2024-11-19 DIAGNOSIS — I48.19 PERSISTENT ATRIAL FIBRILLATION: Chronic | ICD-10-CM

## 2024-11-19 DIAGNOSIS — I65.23 CAROTID ARTERY CALCIFICATION, BILATERAL: ICD-10-CM

## 2024-11-19 DIAGNOSIS — M15.0 PRIMARY OSTEOARTHRITIS INVOLVING MULTIPLE JOINTS: ICD-10-CM

## 2024-11-19 DIAGNOSIS — Z51.5 PALLIATIVE CARE STATUS: ICD-10-CM

## 2024-11-19 DIAGNOSIS — R54 FRAILTY SYNDROME IN GERIATRIC PATIENT: Chronic | ICD-10-CM

## 2024-11-19 DIAGNOSIS — E78.2 MIXED HYPERLIPIDEMIA: ICD-10-CM

## 2024-11-19 DIAGNOSIS — Z78.9 STATIN INTOLERANCE: Chronic | ICD-10-CM

## 2024-11-19 DIAGNOSIS — M48.061 SPINAL STENOSIS AT L4-L5 LEVEL: ICD-10-CM

## 2024-11-19 DIAGNOSIS — I49.3 PVC'S (PREMATURE VENTRICULAR CONTRACTIONS): ICD-10-CM

## 2024-11-19 DIAGNOSIS — Z94.7 HISTORY OF CORNEA TRANSPLANT: Chronic | ICD-10-CM

## 2024-11-19 DIAGNOSIS — M19.011 BILATERAL SHOULDER REGION ARTHRITIS: ICD-10-CM

## 2024-11-19 DIAGNOSIS — R91.8 PULMONARY NODULES/LESIONS, MULTIPLE: ICD-10-CM

## 2024-11-19 DIAGNOSIS — Z79.01 CHRONIC ANTICOAGULATION: ICD-10-CM

## 2024-11-19 DIAGNOSIS — K62.9 RECTAL ABNORMALITY: ICD-10-CM

## 2024-11-19 DIAGNOSIS — I73.9 PAD (PERIPHERAL ARTERY DISEASE): ICD-10-CM

## 2024-11-19 DIAGNOSIS — E53.8 B12 DEFICIENCY: ICD-10-CM

## 2024-11-19 DIAGNOSIS — H35.30 MACULAR DEGENERATION, UNSPECIFIED LATERALITY, UNSPECIFIED TYPE: Chronic | ICD-10-CM

## 2024-11-19 DIAGNOSIS — H01.009 BLEPHARITIS, UNSPECIFIED LATERALITY, UNSPECIFIED TYPE: ICD-10-CM

## 2024-11-19 DIAGNOSIS — G62.9 NEUROPATHY: ICD-10-CM

## 2024-11-19 DIAGNOSIS — M19.012 BILATERAL SHOULDER REGION ARTHRITIS: ICD-10-CM

## 2024-11-19 DIAGNOSIS — E11.22 TYPE 2 DIABETES MELLITUS WITH STAGE 4 CHRONIC KIDNEY DISEASE, WITHOUT LONG-TERM CURRENT USE OF INSULIN: Chronic | ICD-10-CM

## 2024-11-19 DIAGNOSIS — M85.852 OSTEOPENIA OF LEFT HIP: ICD-10-CM

## 2024-11-19 DIAGNOSIS — D68.9 COAGULOPATHY: Chronic | ICD-10-CM

## 2024-11-19 PROBLEM — M79.605 PAIN IN LEFT LEG: Status: RESOLVED | Noted: 2021-08-03 | Resolved: 2024-11-19

## 2024-11-19 PROCEDURE — 86366 MUSCLE-SPECIFIC KINASE ANTB: CPT | Mod: HCNC | Performed by: PSYCHIATRY & NEUROLOGY

## 2024-11-19 PROCEDURE — 86041 ACETYLCHOLN RCPTR BNDNG ANTB: CPT | Mod: HCNC | Performed by: PSYCHIATRY & NEUROLOGY

## 2024-11-19 PROCEDURE — 99999 PR PBB SHADOW E&M-EST. PATIENT-LVL IV: CPT | Mod: PBBFAC,HCNC,, | Performed by: PSYCHIATRY & NEUROLOGY

## 2024-11-19 PROCEDURE — 36415 COLL VENOUS BLD VENIPUNCTURE: CPT | Mod: HCNC | Performed by: PSYCHIATRY & NEUROLOGY

## 2024-11-19 NOTE — PROGRESS NOTES
"Subjective:       Patient ID: Shirley Metz is a 89 y.o. female.    Chief Complaint: Cogwheel rigidity          HPI    The patient presented on 11- for evaluation.    The patient reported longstanding history of generalized weakness that she attributed to statin use.  She also attributed her weakness to reaction to medications  (has 34 listed allergies).  No lateralized or focal weakness. "Cogwheel rigidity" was  noted on examination on 05- which was not reproduced on subsequent examination. Denied tremors. Her exam on 11- showed no signs of parkinsonism.  Denied numbness and tingling. The patient is on palliative care and wanted to keep testing and evaluations to the minimum.      Review of Systems   Constitutional:  Positive for fatigue. Negative for appetite change.   HENT:  Negative for hearing loss and tinnitus.    Eyes:  Positive for visual disturbance. Negative for photophobia.   Respiratory:  Negative for apnea and shortness of breath.    Cardiovascular:  Negative for chest pain and palpitations.   Gastrointestinal:  Negative for nausea and vomiting.   Endocrine: Negative for cold intolerance and heat intolerance.   Genitourinary:  Negative for difficulty urinating and urgency.   Musculoskeletal:  Positive for arthralgias and gait problem. Negative for back pain, joint swelling, myalgias, neck pain and neck stiffness.   Skin:  Negative for color change and rash.   Allergic/Immunologic: Negative for environmental allergies and immunocompromised state.   Neurological:  Positive for weakness. Negative for dizziness, tremors, seizures, syncope, facial asymmetry, speech difficulty, light-headedness, numbness and headaches.   Hematological:  Negative for adenopathy. Does not bruise/bleed easily.   Psychiatric/Behavioral:  Negative for agitation, behavioral problems, confusion, decreased concentration, dysphoric mood, hallucinations, self-injury, sleep disturbance and suicidal ideas. " The patient is nervous/anxious. The patient is not hyperactive.                  Current Outpatient Medications:     acetaminophen (TYLENOL) 500 MG tablet, Take 500 mg by mouth nightly as needed., Disp: , Rfl:     ALPRAZolam (XANAX) 0.5 MG tablet, TAKE 1 TABLET (0.5 MG TOTAL) BY MOUTH NIGHTLY AS NEEDED FOR ANXIETY., Disp: 30 tablet, Rfl: 1    ALPRAZolam (XANAX) 0.5 MG tablet, Take 0.5 mg by mouth 3 (three) times daily., Disp: , Rfl:     b complex vitamins capsule, Take 1 capsule by mouth once daily., Disp: , Rfl:     calcium carbonate/vitamin D3 (VITAMIN D-3 ORAL), Take 4,000 Int'l Units/day by mouth once daily., Disp: , Rfl:     ELIQUIS 2.5 mg Tab, TAKE 1 TABLET (2.5 MG TOTAL) BY MOUTH 2 (TWO) TIMES DAILY., Disp: 180 tablet, Rfl: 2    furosemide (LASIX) 40 MG tablet, TAKE 1 TABLET (40 MG TOTAL) BY MOUTH 2 TIMES A DAY. (Patient taking differently: 40mg in AM and 20 in PM), Disp: 180 tablet, Rfl: 0    latanoprostene bunod (VYZULTA) 0.024 % Drop, Place 1 drop into both eyes every evening., Disp: 2.5 mL, Rfl: 12    metOLazone (ZAROXOLYN) 2.5 MG tablet, Take 1 tablet (2.5 mg total) by mouth as needed (aprx once weekly)., Disp: 12 tablet, Rfl: 3    metoprolol tartrate (LOPRESSOR) 25 MG tablet, TAKE 3 TABLETS (75 MG TOTAL) BY MOUTH 2 (TWO) TIMES DAILY., Disp: 180 tablet, Rfl: 2    midodrine (PROAMATINE) 2.5 MG Tab, Take 1 tablet (2.5 mg total) by mouth 3 (three) times daily as needed (low BP)., Disp: 90 tablet, Rfl: 11    prednisoLONE acetate (PRED FORTE) 1 % DrpS, 1 drop 4 (four) times daily., Disp: , Rfl:     Past Medical History:   Diagnosis Date    Acute on chronic diastolic congestive heart failure 01/11/2023    Anemia 11/29/2018    Anxiety 11/28/2017    Arthritis     Atrial fibrillation with RVR 10/09/2019    Back pain     Basal cell carcinoma 03/29/2018    left mid back    Chronic diastolic congestive heart failure 10/15/2019    CKD (chronic kidney disease) stage 3, GFR 30-59 ml/min 08/08/2016    Colon polyps      reported per patient.     Coronary artery calcification 10/05/2021    Elevated liver enzymes 12/20/2019    Elevated troponin 03/15/2018    Fuchs' corneal dystrophy     General anesthetics causing adverse effect in therapeutic use     woke up during surgery and gagged on ET tube    Glaucoma     Glaucoma     Heart failure with preserved left ventricular function (HFpEF) 07/24/2018    Hyperlipidemia     Hypertension     Macular degeneration dry    Medication side effects 05/03/2017    Obesity     Pre-diabetes 12/04/2017    PVD (peripheral vascular disease) 04/26/2021    PVD (peripheral vascular disease) 04/26/2021    Squamous cell carcinoma 01/08/2019    left shoulder    Stenosis of carotid artery 10/05/2021    Troponin level elevated 01/11/2023    Trouble in sleeping     Type 2 diabetes mellitus without complication, without long-term current use of insulin 02/24/2021    Urinary tract infection        Past Surgical History:   Procedure Laterality Date    ABLATION OF ARRHYTHMOGENIC FOCUS FOR ATRIAL FIBRILLATION N/A 11/03/2022    Procedure: Ablation atrial fibrillation;  Surgeon: Toi Kelly MD;  Location: [facility] EP LAB;  Service: Cardiology;  Laterality: N/A;  afib, PVI/CTI, RFA, STACEY (cx if SR), DEDE, anes, MB, 3 Prep    ADENOIDECTOMY  1938    BREAST BIOPSY Left     1997. benign    BREAST CYST EXCISION Left 1997 approx    CARPAL TUNNEL RELEASE Right 2012 approx    CATARACT EXTRACTION Bilateral 1994    CHOLECYSTECTOMY  1975    CORNEAL TRANSPLANT Bilateral 08/2015 8/2015 - left; Right 4/2016    EYE SURGERY  12/06/2023    Fuch's Corneal dystrophy - had 2nd operation with Dr. Quintanilla    EYE SURGERY      Cataract wIOL    left thumb Left 2011    removed cuboid for arthritis    REVERSE TOTAL SHOULDER ARTHROPLASTY Left 08/21/2018    Procedure: ARTHROPLASTY, SHOULDER, TOTAL, REVERSE;  Surgeon: Solomon Lujan MD;  Location: [facility] OR;  Service: Orthopedics;  Laterality: Left;  WITH BICEP TENODESIS    SKIN CANCER  EXCISION Left 2017    BCC removal by Dr. Valadez    SLT- OS (aka TRABECULOPLASTY) Left 05/11/2011    repeat 3/14/12    TENOTOMY Left 08/21/2018    Procedure: TENOTOMY;  Surgeon: Solomon Lujan MD;  Location: [facility] OR;  Service: Orthopedics;  Laterality: Left;    TONSILLECTOMY  1938    TRANSESOPHAGEAL ECHOCARDIOGRAM WITH POSSIBLE CARDIOVERSION (STACEY W/ POSS CARDIOVERSION) N/A 03/21/2023    Procedure: Transesophageal echo (STACEY) intra-procedure log documentation;  Surgeon: Trevon Ramirez MD;  Location: [facility] CATH LAB;  Service: Cardiology;  Laterality: N/A;    TREATMENT OF CARDIAC ARRHYTHMIA N/A 10/10/2019    Procedure: CARDIOVERSION;  Surgeon: Pete Durán MD;  Location: [facility] CATH LAB;  Service: Cardiology;  Laterality: N/A;    TREATMENT OF CARDIAC ARRHYTHMIA N/A 12/06/2019    Procedure: CARDIOVERSION;  Surgeon: Samy Castillo MD;  Location: [facility] CATH LAB;  Service: Cardiology;  Laterality: N/A;       Social History     Socioeconomic History    Marital status:     Number of children: 3   Tobacco Use    Smoking status: Never    Smokeless tobacco: Never    Tobacco comments:     history of passive smoking from her ex-   Substance and Sexual Activity    Alcohol use: Yes     Alcohol/week: 2.0 standard drinks of alcohol     Types: 2 Standard drinks or equivalent per week     Comment: occ    Drug use: No    Sexual activity: Not Currently     Partners: Male     Birth control/protection: Post-menopausal     Comment: discussed protection for STD's   Other Topics Concern    Are you pregnant or think you may be? No    Breast-feeding No   Social History Narrative     x 2,  x 1. Retired artist - previously doing jewelry/wall pieces; ; lives in [place]; has 3 sons - 52( lives in [place], 54, and 56;exercises minimally - limited due to back issues. Still drives. Does have a Living Will.     Social Drivers of Health     Financial Resource Strain: Low Risk  (7/30/2024)    Overall  Financial Resource Strain (CARDIA)     Difficulty of Paying Living Expenses: Not hard at all   Food Insecurity: No Food Insecurity (7/30/2024)    Hunger Vital Sign     Worried About Running Out of Food in the Last Year: Never true     Ran Out of Food in the Last Year: Never true   Transportation Needs: No Transportation Needs (7/30/2024)    TRANSPORTATION NEEDS     Transportation : No   Physical Activity: Inactive (7/30/2024)    Exercise Vital Sign     Days of Exercise per Week: 0 days     Minutes of Exercise per Session: 0 min   Stress: No Stress Concern Present (7/30/2024)    [employer] [place] [employer] - Occupational Stress Questionnaire     Feeling of Stress : Not at all   Housing Stability: Low Risk  (7/30/2024)    Housing Stability Vital Sign     Unable to Pay for Housing in the Last Year: No     Homeless in the Last Year: No             Past/Current Medical/Surgical History, Past/Current Social History, Past/Current Family History and Past/Current Medications were reviewed in detail.        Objective:           VITAL SIGNS WERE REVIEWED      GENERAL APPEARANCE:     The patient looks comfortable.    BMI 30.65    No signs of respiratory distress.    Normal breathing pattern.    No dysmorphic features    Normal eye contact.       GENERAL MEDICAL EXAM:    HEENT:  Head is atraumatic normocephalic.     FUNDUSCOPIC (OPHTHALMOSCOPIC) EXAMINATION showed no disc edema (papilledema).      NECK: No JVD. No visible lesions or goiters.     CHEST-CARDIOPULMONARY: No cyanosis. No tachypnea. Normal respiratory effort.    LNCSTII-SIMQNFFCLNERSWAX-RJAQIRCUAP: No jaundice. No stomas or lesions. No visible hernias. No catheters.     SKIN, HAIR, NAILS: No pathognomonic skin rash.No neurofibromatosis. No visible lesions.No stigmata of autoimmune disease. No clubbing.    LIMBS: No varicose veins. No visible swelling.    MUSCULOSKELETAL: OA visible deformities.Dupuytren contractures.              Neurological  Exam  Mental Status  Awake and alert. Oriented to person, place, time and situation. Recent and remote memory are intact. Speech is normal. Language is fluent with no aphasia. Attention and concentration are normal. Fund of knowledge is appropriate for level of education. Apraxia absent.    Cranial Nerves  CN II: Decreased. Significantly Decreased . Visual fields full to confrontation. Right funduscopic exam: disc intact. Left funduscopic exam: disc intact.  CN III, IV, VI: Extraocular movements intact bilaterally. Normal lids and orbits bilaterally. Pupils equal round and reactive to light bilaterally.  CN V: Facial sensation is normal.  CN VII: Full and symmetric facial movement.  CN VIII: Hearing is normal.  CN IX, X: Palate elevates symmetrically  CN XI: Shoulder shrug strength is normal.  CN XII: Tongue midline without atrophy or fasciculations.    Motor  Normal muscle bulk throughout. No fasciculations present. Normal muscle tone. No abnormal involuntary movements. No pronator drift.                                             Right                     Left  Neck flexion                           5                          5  Neck extension                      5                          5   Shoulder abduction               5                          5   Shoulder adduction               5                          5   Shoulder internal rotation      5                          5  Shoulder external rotation     5                          5  Elbow flexion                         5                          5  Elbow extension                    5                          5  Wrist flexion                           5                          5  Wrist extension                      5                          5  Supination                             5                          5  Pronation                               5                          5  Finger flexion                         5                           5  Finger extension                    5                          5  Finger abduction                    5                          5  Hip flexion                              5                          5  Hip extension                         5                          5  Hip abduction                         5                          5  Hip adduction                         5                          5  Knee flexion                           5                          5  Knee extension                      5                          5  Ankle inversion                      5                          5  Ankle eversion                       5                          5  Plantarflexion                         5                          5  Dorsiflexion                            5                          5    Sensory  Light touch is normal in upper and lower extremities. Pinprick is normal in upper and lower extremities. Temperature is normal in upper and lower extremities. Vibration is normal in upper and lower extremities. Proprioception is normal in upper and lower extremities.  No right-sided hemispatial neglect. No left-sided hemispatial neglect. Right agraphesthesia absent. Left agraphesthesia absent. Right astereognosis absent. Left astereognosis absent.    Reflexes                                            Right                      Left  Brachioradialis                    2+                         2+  Biceps                                 2+                         2+  Triceps                                2+                         2+  Patellar                                1+                         1+  Achilles                                0                         0  Right Plantar: normal  Left Plantar: normal  Jaw jerk absent.  Right pathological reflexes: Amish's absent. Crossed adductor absent. Ankle clonus absent.  Left pathological reflexes: Amish's absent. Crossed adductor absent.  Ankle clonus absent.    Coordination  Right: Finger-to-nose normal. Rapid alternating movement normal. Heel-to-shin normal.Left: Finger-to-nose normal. Rapid alternating movement normal. Heel-to-shin normal.    Gait  Casual gait is normal including stance, stride, and arm swing. Able to rise from chair without using arms.    Antalgic Gait.      Lab Results   Component Value Date    WBC 7.07 09/23/2024    HGB 13.1 09/23/2024    HCT 39.9 09/23/2024     (H) 09/23/2024     09/23/2024       Sodium   Date Value Ref Range Status   09/23/2024 144 136 - 145 mmol/L Final     Potassium   Date Value Ref Range Status   09/23/2024 3.9 3.5 - 5.1 mmol/L Final     Chloride   Date Value Ref Range Status   09/23/2024 104 95 - 110 mmol/L Final     CO2   Date Value Ref Range Status   09/23/2024 29 23 - 29 mmol/L Final     Glucose   Date Value Ref Range Status   09/23/2024 167 (H) 70 - 110 mg/dL Final     BUN   Date Value Ref Range Status   09/23/2024 44 (H) 10 - 30 mg/dL Final     Creatinine   Date Value Ref Range Status   09/23/2024 2.0 (H) 0.5 - 1.4 mg/dL Final     Calcium   Date Value Ref Range Status   09/23/2024 9.4 8.7 - 10.5 mg/dL Final     Total Protein   Date Value Ref Range Status   09/23/2024 7.1 6.0 - 8.4 g/dL Final     Albumin   Date Value Ref Range Status   09/23/2024 3.7 3.5 - 5.2 g/dL Final     Total Bilirubin   Date Value Ref Range Status   09/23/2024 0.6 0.1 - 1.0 mg/dL Final     Comment:     For infants and newborns, interpretation of results should be based  on gestational age, weight and in agreement with clinical  observations.    Premature Infant recommended reference ranges:  Up to 24 hours.............<8.0 mg/dL  Up to 48 hours............<12.0 mg/dL  3-5 days..................<15.0 mg/dL  6-29 days.................<15.0 mg/dL       Alkaline Phosphatase   Date Value Ref Range Status   09/23/2024 70 55 - 135 U/L Final     AST   Date Value Ref Range Status   09/23/2024 19 10 - 40 U/L Final     ALT    Date Value Ref Range Status   09/23/2024 13 10 - 44 U/L Final     Anion Gap   Date Value Ref Range Status   09/23/2024 11 8 - 16 mmol/L Final     eGFR if    Date Value Ref Range Status   07/26/2022 30 (A) >60 mL/min/1.73 m^2 Final     eGFR if non    Date Value Ref Range Status   07/26/2022 26 (A) >60 mL/min/1.73 m^2 Final     Comment:     Calculation used to obtain the estimated glomerular filtration  rate (eGFR) is the CKD-EPI equation.          Lab Results   Component Value Date    [id number] 334 09/23/2024       Lab Results   Component Value Date    TSH 3.577 04/27/2023    FREET4 1.03 04/27/2023         LABORATORY EVALUATION    [id number]    CBC Macrocytosis, CMP CKD-High PTH,  HA1C 5.9-7.0 (6.3), Vitamin D 28-44 (44), TFT NL, B12-FA NL, CK-Aldolase NL,  AID Panel NL, Myomarker Panel NL      RADIOLOGY EVALUATION       11-    [facility] NL.    NEUROPHYSIOLOGY EVALUATION       PATHOLOGY EVALUATION        NEUROCOGNITIVE AND NEUROPSYCHOLOGY EVALUATION           Reviewed the neuroimaging independently       Assessment:           1. Weakness    2. Primary hypertension    3. Mixed hyperlipidemia    4. Primary osteoarthritis involving multiple joints    5. Macular degeneration, unspecified laterality, unspecified type    6. Fuchs' corneal dystrophy, unspecified laterality    7. Lens replaced by other means    8. Chronic open angle glaucoma of both eyes, unspecified glaucoma stage    9. Blepharitis, unspecified laterality, unspecified type    10. Abnormal ECG    11. PVC's (premature ventricular contractions)    12. Other microscopic hematuria    13. Hypertensive left ventricular hypertrophy, without heart failure    14. Osteopenia of left hip    15. Myalgia    16. Calcification of aorta    17. Neuropathy    18. Unequal blood pressures in paired extremities    19. History of cornea transplant    20. Pseudophakia    21. Insomnia, unspecified type    22. Anxiety    23. Obesity (BMI 30.0-34.9)     24. Bilateral shoulder region arthritis    25. 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 Thrombus of left atrial appendage    27. Persistent atrial fibrillation    28. Atrial flutter with rapid ventricular response    29. Chronic anticoagulation    30. Keratitis sicca, bilateral    31. 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    32. Type 2 diabetes mellitus with stage 4 chronic kidney disease, without long-term current use of insulin    33. PAD (peripheral artery disease)    34. Statin intolerance    35. Pulmonary nodules/lesions, multiple    36. Spinal stenosis at L4-L5 level    37. Pulmonary granuloma    38. Lumbar spondylosis    39. Bilateral recurrent inguinal hernia without obstruction or gangrene    40. Rectal abnormality    41. Stenosis of carotid artery, unspecified laterality    42. Carotid artery calcification, bilateral    43. B12 deficiency    44. Seasonal allergic rhinitis, unspecified trigger    45. Atrial fibrillation with RVR    46. Elevated serum creatinine    47. Atheroma of artery    48. Coagulopathy    49. Hyperparathyroidism    50. Frailty syndrome in geriatric patient    51. Abnormality of gait and mobility    52. Macrocytosis without anemia    53. Palliative care status          Plan:               NON-FOCAL GENERALIZED WEAKNESS       The patient is on palliative care and wanted to keep testing and evaluations to the minimum.    Evaluate MG Panel (AChR Abs, MuSK Abs) and the patient agreed to proceeding with the test.    Declined brain imaging.    Declined brain Geena to rule out parkinsonism.     Declined NCS/EMG testing.           MEDICAL/SURGICAL COMORBIDITIES     All relevant medical comorbidities noted and managed by primary care physician and medical care team.          HEALTHY LIFESTYLE AND PREVENTATIVE CARE    The patient to adhere to the age-appropriate health maintenance guidelines including screening tests and vaccinations. The  patient to adhere to  healthy lifestyle, optimal weight, exercise, healthy diet, good sleep hygiene and avoiding drugs including smoking, alcohol and recreational drugs.          RTC PRN     Leeroy Zepeda MD, FAAN    Attending Neurologist/Epileptologist         Diplomate, American Board of Psychiatry and Neurology    Diplomate, American Board of Clinical Neurophysiology     Fellow, American Academy of Neurology           I spent a total of 61 minutes on the day of the visit.  This includes face to face time and non-face to face time preparing to see the patient (eg, review of tests), obtaining and/or reviewing separately obtained history, documenting clinical information in the electronic or other health record, independently interpreting results and communicating results to the patient/family/caregiver, or care coordinator.

## 2024-11-25 LAB
ACHR BIND AB SER-SCNC: 0 NMOL/L
IMMUNOLOGIST REVIEW: NORMAL
MUSK ANTIBODY TEST: 0 NMOL/L (ref 0–0.02)

## 2024-12-03 ENCOUNTER — OFFICE VISIT (OUTPATIENT)
Dept: PALLIATIVE MEDICINE | Facility: CLINIC | Age: 89
End: 2024-12-03
Payer: MEDICARE

## 2024-12-03 VITALS
TEMPERATURE: 97 F | BODY MASS INDEX: 30.48 KG/M2 | HEART RATE: 101 BPM | HEIGHT: 64 IN | DIASTOLIC BLOOD PRESSURE: 71 MMHG | SYSTOLIC BLOOD PRESSURE: 109 MMHG | WEIGHT: 178.56 LBS

## 2024-12-03 DIAGNOSIS — D68.9 COAGULOPATHY: Chronic | ICD-10-CM

## 2024-12-03 DIAGNOSIS — N19 HYPERTENSIVE HEART AND RENAL DISEASE WITH BOTH (CONGESTIVE) HEART FAILURE AND RENAL FAILURE: Chronic | ICD-10-CM

## 2024-12-03 DIAGNOSIS — I48.91 ATRIAL FIBRILLATION WITH RVR: Primary | Chronic | ICD-10-CM

## 2024-12-03 DIAGNOSIS — I13.2 HYPERTENSIVE HEART AND RENAL DISEASE WITH BOTH (CONGESTIVE) HEART FAILURE AND RENAL FAILURE: Chronic | ICD-10-CM

## 2024-12-03 DIAGNOSIS — H40.1130 CHRONIC OPEN ANGLE GLAUCOMA OF BOTH EYES, UNSPECIFIED GLAUCOMA STAGE: Chronic | ICD-10-CM

## 2024-12-03 DIAGNOSIS — Z51.5 ENCOUNTER FOR PALLIATIVE CARE: ICD-10-CM

## 2024-12-03 PROCEDURE — 1125F AMNT PAIN NOTED PAIN PRSNT: CPT | Mod: HCNC,CPTII,S$GLB, | Performed by: NURSE PRACTITIONER

## 2024-12-03 PROCEDURE — 1101F PT FALLS ASSESS-DOCD LE1/YR: CPT | Mod: HCNC,CPTII,S$GLB, | Performed by: NURSE PRACTITIONER

## 2024-12-03 PROCEDURE — 3288F FALL RISK ASSESSMENT DOCD: CPT | Mod: HCNC,CPTII,S$GLB, | Performed by: NURSE PRACTITIONER

## 2024-12-03 PROCEDURE — 99215 OFFICE O/P EST HI 40 MIN: CPT | Mod: HCNC,S$GLB,, | Performed by: NURSE PRACTITIONER

## 2024-12-03 PROCEDURE — 99999 PR PBB SHADOW E&M-EST. PATIENT-LVL III: CPT | Mod: PBBFAC,HCNC,, | Performed by: NURSE PRACTITIONER

## 2024-12-03 NOTE — PROGRESS NOTES
Palliative Medicine Clinic Note        Consult Requested By: No ref. provider found      Reason for Consult: symptom management and goals of care    Chief Complaint:   Chief Complaint   Patient presents with    Fatigue     General weakness, stated she has to use her walker inside the home now for ambulatory support.        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   Followed by cardiology and PCP  Desires limited interventon with focus on function and QOL  Continue POC         Atrial fibrillation with RVR - Primary (Chronic)     Difficult balance between HR control and maintaining adequate BP  Followed by cardiology  We discussed this at length:  If heart rate is over 110 then take metoprolol 100 mg twice daily.  If your blood pressure is low at this time take midodrine with metoprolol to support your blood pressure.   In other words, do not hold or take lower dose of metoprolol because your blood pressure is low. Instead, take midodrine to support your blood pressure while the metoprolol lowers your heart rate.             Hematology    Coagulopathy (Chronic)     Tolerating Eliquis well without sx of bleeding  Lab Results   Component Value Date    HGB 13.1 09/23/2024               Palliative Care    Encounter for palliative care     Goals of care: She would like to remain a full code presently but if resuscitation necessary she would not want to be on ventilator more than a few days. Reviewed living will and [place]. She will discuss further with her son.   Advanced directive: Would not want any prolonged ventilator support. Would  not want feeding tube placed for conditions from which she is not expected to recover.   HC POA: Son Boris and daughter-in-law; they have copy of HC POA and know her HC wishes. Encouraged to bring a copy for records at [facility]  Symptoms: generalized weakness  Follow up: 3 months, sooner if needed.            Advance Care Planning   Advance Directives:   Goals of Care: What is most important right now is to focus on avoiding the hospital, remaining as independent as possible, symptom/pain control. Accordingly, we have decided that the best plan to meet the patient's goals includes continuing with treatment.           Thank you for involving me in the care of this patient.     No follow-ups on file.    Future Appointments   Date Time Provider Department Center   2/10/2025 10:30 AM Heather Miller NP [facility]   2/21/2025  1:00 PM Shahana Louie FNP-C [id number]   5/26/2025  1:00 PM Jo-Ann Jacobo [facility]   12/10/2025  9:45 AM Toi Kelly MD ON [facility]        SUBJECTIVE:      History of Present Illness / Interval History:  Shirley Metz is 89 y.o. 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 Presents to Palliative Care Clinic for physical symptoms, advance care planning,, and additional support.. Please see cardiology and PCP note for more details.     Chart Review:  11/4/24 cardiology  1. Atrial fibrillation with RVR    2. Carotid artery calcification, bilateral    3. Primary hypertension    4. Mixed hyperlipidemia    5. PAD (peripheral artery disease)    6. Chronic anticoagulation    7. Obesity (BMI 30.0-34.9)    8. Type 2 diabetes mellitus with stage 4 chronic kidney disease, without long-term current use of insulin    9. Statin intolerance       Patient presents for f/u. Doing clinically well. Hypotension  resolved. Continue same CV meds/mgmt. Can utilize midodrine prn. Monitor BP at home. Discussed repeat TTE if BP issues recur.  Plan:  -Continue current CV meds/mgmt  -Cardiac low salt diet  -Monitor BP at home, midodrine prn  -Consider repeat TTE  -F/u with PA in January  -Recent labs reviewed/discussed    11/13/24 electrocardiology note - continue POC        12/3/24  History obtained from: patient and medical record.    Still with eye problems, double vision with floaters near vision.   Seeing Dr Garsia now. Concerns with cataract implant.   Considering prism glass for reading.     Hematoma to buttock gradually improving.   Tolerating Eliquis well without sx of bleeding.     Taking midodrine.     Had rxn to opth prednisone.           Muscle cramping bilateral calves. Difficulty getting OOB. Relieved by Arnica and ice.   Most severe yet. Bilateral. Not eating bananas as previous, cannot access.     Using diuretic 1-2 times/week.         Today we discussed symptom management and goals of care.        ROS:  Review of Systems    Review of Symptoms      Symptom Assessment (ESAS 0-10 Scale)  Pain:  0  Dyspnea:  0  Anxiety:  0  Nausea:  0  Depression:  0  Anorexia:  0  Fatigue:  0  Insomnia:  0  Restlessness:  0  Agitation:  0         Living Arrangements:  Lives alone and Lives in apartment    Psychosocial/Cultural:   See Palliative Psychosocial Note: No  **Primary  to Follow**  Palliative Care  Consult: No        Medications:    Current Outpatient Medications:     acetaminophen (TYLENOL) 500 MG tablet, Take 500 mg by mouth nightly as needed., Disp: , Rfl:     ALPRAZolam (XANAX) 0.5 MG tablet, Take 0.5 mg by mouth 3 (three) times daily., Disp: , Rfl:     b complex vitamins capsule, Take 1 capsule by mouth once daily., Disp: , Rfl:     calcium carbonate/vitamin D3 (VITAMIN D-3 ORAL), Take 4,000 Int'l Units/day by mouth once daily., Disp: , Rfl:     latanoprostene bunod (VYZULTA) 0.024 % Drop,  Place 1 drop into both eyes every evening., Disp: 2.5 mL, Rfl: 12    metOLazone (ZAROXOLYN) 2.5 MG tablet, Take 1 tablet (2.5 mg total) by mouth as needed (aprx once weekly)., Disp: 12 tablet, Rfl: 3    midodrine (PROAMATINE) 2.5 MG Tab, Take 1 tablet (2.5 mg total) by mouth 3 (three) times daily as needed (low BP)., Disp: 90 tablet, Rfl: 11    ALPRAZolam (XANAX) 0.5 MG tablet, TAKE 1 TABLET (0.5 MG TOTAL) BY MOUTH NIGHTLY AS NEEDED FOR ANXIETY., Disp: 30 tablet, Rfl: 0    ELIQUIS 2.5 mg Tab, TAKE 1 TABLET (2.5 MG TOTAL) BY MOUTH 2 (TWO) TIMES DAILY., Disp: 180 tablet, Rfl: 1    furosemide (LASIX) 40 MG tablet, Take 1 tab in am and 1/2 tab in evening, Disp: 90 tablet, Rfl: 1    metoprolol tartrate (LOPRESSOR) 25 MG tablet, TAKE 3 TABLETS (75 MG TOTAL) BY MOUTH 2 (TWO) TIMES DAILY., Disp: 180 tablet, Rfl: 1    External  database queried on 02/07/2025  by Heather CHU :     N/A    Review of patient's allergies indicates:   Allergen Reactions    Carvedilol Swelling     Lip swelling    Cephalexin Other (See Comments)     Muscle/joint weakness unable to move     Doxycycline Shortness Of Breath, Nausea And Vomiting and Other (See Comments)     weakness    Erythromycin Other (See Comments)     Complete weakness/could not walk    Gadolinium-containing contrast media Swelling     angioedema    Hydrocodone-acetaminophen Shortness Of Breath     wheezing    Labetalol Shortness Of Breath, Nausea Only, Palpitations and Other (See Comments)     weakness    Loratadine Other (See Comments)     Double vision/spots  Other reaction(s): double vision    Losartan Other (See Comments)     weakness    Loteprednol etabonate Other (See Comments), Palpitations, Anxiety and Shortness Of Breath     Patient stated bp went up and extreme muscle weakness    Naproxen      wheezing  wheezing  wheezing  Other reaction(s): wheezing    Prednisone Other (See Comments)     Myalgias and generalized weakness, unable to walk    Statins-hmg-coa  reductase inhibitors Other (See Comments)     Severe Muscle weakness  Muscle weakness  Severe Muscle weakness  Other reaction(s): severe muscle weakness    Amlodipine Other (See Comments)     Myalgias    Fenofibrate Other (See Comments)     muscle weakness      Hydralazine Other (See Comments)     muscle tremors    Loteprednol Other (See Comments)     increased BP    Macrolide antibiotics Other (See Comments)     Complete weakness/could not walk      Moxifloxacin Hives and Other (See Comments)     muscle soreness    Timolol Other (See Comments)     Blurred vision, Burning eyes, Severe muscle weakness, eye problems.      Verapamil Diarrhea     Severe diarrhea.      Hydrocortisone     Isosorbide      Tolerates Imdur    Silver sulfadiaz-foam bandage     Tetanus and diphtheria toxoids     Tetanus vaccines and toxoid     Adhesive Rash     Clonidine patch--contact dermatitis    Codeine Diarrhea and Other (See Comments)     Loss of balance       Doxazosin Palpitations    Fexofenadine Other (See Comments)     Double vision/spots       Hydrocortisone (bulk) Other (See Comments)     Only suppository/ caused muscle weakness    Latanoprost Other (See Comments)     Muscle weakness      Olopatadine Other (See Comments)     Muscle weakness          OBJECTIVE:   Physical Exam:  Vitals: Temp: 97.3 °F (36.3 °C) (12/03/24 1104)  Pulse: 101 (12/03/24 1104)  BP: 109/71 (12/03/24 1104)    Physical Exam    Wt Readings from Last 3 Encounters:   01/27/25 0922 82.3 kg (181 lb 7 oz)   01/03/25 0950 81.9 kg (180 lb 8.9 oz)   12/03/24 1104 81 kg (178 lb 9.2 oz)     BP Readings from Last 3 Encounters:   01/27/25 124/78   01/03/25 110/74   12/03/24 109/71       Labs:  Lab Results   Component Value Date    WBC 7.07 09/23/2024    HGB 13.1 09/23/2024    HCT 39.9 09/23/2024     (H) 09/23/2024     09/23/2024       Sodium   Date Value Ref Range Status   09/23/2024 144 136 - 145 mmol/L Final     Potassium   Date Value Ref Range Status    09/23/2024 3.9 3.5 - 5.1 mmol/L Final     Glucose   Date Value Ref Range Status   09/23/2024 167 (H) 70 - 110 mg/dL Final     BUN   Date Value Ref Range Status   09/23/2024 44 (H) 10 - 30 mg/dL Final     Creatinine   Date Value Ref Range Status   09/23/2024 2.0 (H) 0.5 - 1.4 mg/dL Final     Calcium   Date Value Ref Range Status   09/23/2024 9.4 8.7 - 10.5 mg/dL Final     Total Protein   Date Value Ref Range Status   09/23/2024 7.1 6.0 - 8.4 g/dL Final     Albumin   Date Value Ref Range Status   09/23/2024 3.7 3.5 - 5.2 g/dL Final     AST   Date Value Ref Range Status   09/23/2024 19 10 - 40 U/L Final     ALT   Date Value Ref Range Status   09/23/2024 13 10 - 44 U/L Final     eGFR   Date Value Ref Range Status   09/23/2024 23.2 (A) >60 mL/min/1.73 m^2 Final       Imaging:  X-Ray Pelvis Complete min 3 views  Narrative: EXAM:  XR PELVIS COMPLETE MIN 3 VIEWS    CLINICAL HISTORY: Pelvic pain    FINDINGS: No fracture, dislocation or other acute abnormality seen in the pelvis.  Scattered mild to moderate degenerative changes lower lumbar spine and bilateral hip joints.  Impression:  No acute disease is seen in the pelvis.  Chronic findings as detailed above.    Finalized on: 6/26/2024 12:57 PM By:  Darell Calderon MD  [facility]# [id number]      2024-06-26 12:59:51.581    [facility]  X-Ray Cervical Spine AP And Lateral  EXAM: XR CERVICAL SPINE AP LATERAL    CLINICAL HISTORY: [M54.2]-Cervicalgia. TechNotes: Attempted multiple open mouth views. Sent best image obtainable    FINDINGS:  3 views of the cervical spine.    Moderate to severe multilevel degenerative disc disease most pronounced involving the C5-T1 levels.  Alignment is maintained.  Odontoid is not well profiled.  Significant degenerative changes involving the facets.  No acute fracture.    IMPRESSION:  Multilevel degenerative changes.    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    FINDINGS:  2 views each shoulder.  Left shoulder: Reverse shoulder replacement without evidence of hardware complication.  Right shoulder: Moderate to severe glenohumeral degenerative changes with joint space loss.  No acute fracture or dislocation.    IMPRESSION:  As above.    Finalized on: 6/26/2024 12:12 PM By:  Danial Bar MD  [facility]# [id number]      2024-06-26 12:14:38.848    [facility]       I spent a total of 40 minutes on the day of the visit. This includes face to face time in discussion of goals of care, symptom assessment, coordination of care and emotional support.  This also includes non-face to face time preparing to see the patient (eg, review of tests/imaging), obtaining and/or reviewing separately obtained history, documenting clinical information in the electronic or other health record, independently interpreting results and communicating results to the patient/family/caregiver, or care coordinator.     Additional 10 min time spent on a voluntary advance care planning and /or goals of care discussion, providing emotional support, formulating and communicating prognosis and exploring burden/benefit of various approaches of treatment.       Heather Miller NP

## 2024-12-03 NOTE — PATIENT INSTRUCTIONS
If heart rate is over 110 then take metoprolol 100 mg twice daily.  If your blood pressure is low at this time take midodrine with metoprolol to support your blood pressure.     In other words, do not hold or take lower dose of metoprolol because your blood pressure is low. Instead, take midodrine to support your blood pressure while the metoprolol lowers your heart rate.

## 2024-12-05 NOTE — ASSESSMENT & PLAN NOTE
Difficult balance between HR control and maintaining adequate BP  Followed by cardiology  We discussed this at length:  If heart rate is over 110 then take metoprolol 100 mg twice daily.  If your blood pressure is low at this time take midodrine with metoprolol to support your blood pressure.   In other words, do not hold or take lower dose of metoprolol because your blood pressure is low. Instead, take midodrine to support your blood pressure while the metoprolol lowers your heart rate.

## 2024-12-05 NOTE — ASSESSMENT & PLAN NOTE
Tolerating Eliquis well without sx of bleeding  Lab Results   Component Value Date    HGB 13.1 09/23/2024

## 2024-12-12 ENCOUNTER — TELEPHONE (OUTPATIENT)
Dept: OPHTHALMOLOGY | Facility: CLINIC | Age: 89
End: 2024-12-12
Payer: MEDICARE

## 2024-12-16 DIAGNOSIS — I48.0 PAF (PAROXYSMAL ATRIAL FIBRILLATION): Chronic | ICD-10-CM

## 2024-12-17 RX ORDER — METOPROLOL TARTRATE 25 MG/1
75 TABLET, FILM COATED ORAL 2 TIMES DAILY
Qty: 180 TABLET | Refills: 1 | Status: SHIPPED | OUTPATIENT
Start: 2024-12-17

## 2024-12-18 ENCOUNTER — TELEPHONE (OUTPATIENT)
Dept: PRIMARY CARE CLINIC | Facility: CLINIC | Age: 89
End: 2024-12-18
Payer: MEDICARE

## 2024-12-18 RX ORDER — FUROSEMIDE 40 MG/1
TABLET ORAL
Qty: 180 TABLET | Refills: 0 | Status: CANCELLED | OUTPATIENT
Start: 2024-12-18

## 2024-12-18 RX ORDER — FUROSEMIDE 40 MG/1
TABLET ORAL
Qty: 180 TABLET | Refills: 0 | OUTPATIENT
Start: 2024-12-18

## 2024-12-18 NOTE — TELEPHONE ENCOUNTER
----- Message from ALPHONSE Ramires sent at 12/18/2024  8:29 AM CST -----  Regarding: FW: Rx  Please see pt message below  ----- Message -----  From: Re Nguyen  Sent: 12/18/2024   8:27 AM CST  To: Pancho Whatley Staff  Subject: Rx                                               Pt called asking if a refill of her Lasix and metoprolol tartrate prescription could be sent to [facility] [[address], [place] [address]]. For any further information, please contact pt at [phone]; please advise.

## 2024-12-19 ENCOUNTER — TELEPHONE (OUTPATIENT)
Dept: PALLIATIVE MEDICINE | Facility: HOSPITAL | Age: 89
End: 2024-12-19
Payer: MEDICARE

## 2024-12-19 DIAGNOSIS — I13.2 HYPERTENSIVE HEART AND RENAL DISEASE WITH BOTH (CONGESTIVE) HEART FAILURE AND RENAL FAILURE: Primary | Chronic | ICD-10-CM

## 2024-12-19 DIAGNOSIS — N19 HYPERTENSIVE HEART AND RENAL DISEASE WITH BOTH (CONGESTIVE) HEART FAILURE AND RENAL FAILURE: Primary | Chronic | ICD-10-CM

## 2024-12-19 RX ORDER — FUROSEMIDE 40 MG/1
40 TABLET ORAL 2 TIMES DAILY
Qty: 180 TABLET | Refills: 0 | Status: SHIPPED | OUTPATIENT
Start: 2024-12-19

## 2024-12-19 RX ORDER — FUROSEMIDE 40 MG/1
40 TABLET ORAL DAILY
Qty: 180 TABLET | Refills: 0 | Status: CANCELLED | OUTPATIENT
Start: 2024-12-19

## 2024-12-19 NOTE — TELEPHONE ENCOUNTER
Call to pt re: refill request for Furosemide (Lasix), refill request pended to Heather Miller NP.

## 2024-12-19 NOTE — TELEPHONE ENCOUNTER
----- Message from Nurse Meadows sent at 12/17/2024  7:42 PM CST -----  Contact: Pt  [phone] or [phone]    ----- Message -----  From: Adelina Medellin  Sent: 12/17/2024   8:11 AM CST  To: Angela BRADLEY Staff    Would like to receive medical advice.    Would they like a call back or a response via [facility]:  call back     Additional information:  Pt is calling to speak to someone about her prescriptions.  She is out of medication but did not want to submit a refill request without speaking to the nurse first.

## 2024-12-19 NOTE — TELEPHONE ENCOUNTER
----- Message from Nurse Meadows sent at 12/17/2024  7:42 PM CST -----  Contact: Pt  [phone] or [phone]    ----- Message -----  From: Adelina Medellin  Sent: 12/17/2024   8:11 AM CST  To: Angela BRADLEY Staff    Would like to receive medical advice.    Would they like a call back or a response via [facility]:  call back     Additional information:  Pt is calling to speak to someone about her prescriptions.  She is out of medication but did not want to submit a refill request without speaking to the nurse first.

## 2025-01-02 NOTE — PROGRESS NOTES
Shirley Bettencourt [place]  01/03/2025  [id number]    Phylicia Deleon MD  Patient Care Team:  Phylicia Deleon MD as PCP - General (Internal Medicine)  Wilbert Ware MD as Consulting Physician (Ophthalmology)  Rajinder Quintanilla MD as Consulting Physician (Ophthalmology)  Rosario Valadez MD as Consulting Physician (Dermatology)  Amish Mendoza MD (Pulmonary Disease)  Emmanuel Fish MD as Consulting Physician (Hand Surgery)  Phylicia Deleon MD as Physician (Internal Medicine)  Heather Miller NP as Nurse Practitioner (Palliative Medicine)  Jo-Ann Jacobo PA-C as Physician Assistant (Cardiology)  Chelsea Hughes FNP-C as Nurse Practitioner (Cardiology)  Wilbert Worthington MD as Consulting Physician (Orthopedic Surgery)    Visit Type: Follow-up    Ms. Edmond is a 89 year old female w multiple chronic medical issues including Diastolic CHF, HTN, atherosclerosis, CKD, Afib/aflutter w RVR on Eliquis, obesity, pre-diabetes, osteopenia, and OA.     She is also being followed by outpatient Palliative Care NP Angela    History of Present Illness    CHIEF COMPLAINT:  Ms. Bettencourt presents today for medication management and follow-up on multiple health issues.    CURRENT MEDICATIONS:  She takes Lasix 40 mg morning and 1/2 of 40 mg (20 mg) evening, Metoprolol 25 mg x 3 (75 mg) twice daily with instructions to take 25 mg x 4 (100 mg) if heart rate is over 110. She has been advised to take Midodrine as needed if BP is low rather than decrease or hold Metoprolol. She takes Metolazone aprx once weekly. She is working on compiling a comprehensive list of medication allergies.    PAIN MANAGEMENT:  She experiences diffuse musculoskeletal pain from neck to feet, predominantly muscular in nature. She reports bilateral shoulder pain, including the shoulder with previous implant. Her wrist has increased pain with associated swelling. She uses topical Arnica and a Mirtha topical cream with good  effect, with periodic breaks from Arnica use. She wears hand splints at night which provides relief.    OCULAR:  She is followed by Dr. Conley with [facility] for glaucoma. She experiences double vision while reading and reports a granulated area blocking vision in her left eye, described as looking through sugar crystals, which impairs her reading ability. She discontinued prednisone drops prescribed by  for eye inflammation after 5 days due to what she felt to be an allergic reaction affecting her mobility.       PHQ-4 Score: 0     From LOV w me 10/16/24  OPHTHALMOLOGICAL ISSUES:  She reports ongoing diplopia since eye surgery, which has improved but persists, particularly when reading. She is currently under the care of multiple eye specialists and has upcoming appointments with Dr. Conley (glaucoma specialist) and Dr. Sanchez (eye transplant surgeon).  CARDIOVASCULAR CONCERNS:  She reports recent episodes of low blood pressure occurring in spells, with three episodes since her eye operations. Home blood pressure readings from early September to October 9th range from 90s/50s to 114/70s, with occasional higher readings of 135-142 systolic. She has been prescribed midodrine but is reluctant to use it  - feels it causes scalp irritation. She uses coffee and potato chips as alternative remedies to raise her blood pressure. She reports occasional episodes of atrial fibrillation, noting that her blood pressure cuff shuts off when atrial fibrillation is detected.  MEDICATION MANAGEMENT:  She takes metolazone (Zaroxolin) as needed, approximately once weekly, when she feels the furosemide (Lasix) is not providing sufficient diuresis. She continues to take furosemide as prescribed. She is currently taking metoprolol, Eliquis, and eye drops, and reports being off most of her other medications.  MUSCULOSKELETAL PAIN:  She reports pelvic pain in the left buttock area, describing bumps on the bottom of her pelvic bone that  cause pain when sitting. An MRI revealed an abnormal asymmetric soft tissue signal in the deep soft tissues of the posterior left hemipelvis, with Dr. Worthington suspecting inflammation around the hamstring insertion. She experiences swelling and hardness near the ischium, which varies in size. She also reports recent weakness in her arms and hands, difficulty with fine motor skills, leg weakness, back pain (particularly in the morning), and sciatic nerve pain. She completed six weeks of physical therapy for arm and leg weakness, which she found helpful, but has not fully recovered her strength.  DERMATOLOGICAL ISSUES:  She reports a spot on her back that she would like examined. She also describes ongoing itching affecting her forehead, ears, and back, which has been less severe in the past couple of days. She has been applying topical Benadryl or another topical medication to the affected areas for relief.  NEW SYMPTOMS:  She reports experiencing new, intense hot flashes over the past three weeks, primarily occurring at night when going to bed or upon waking in the morning. These episodes necessitate her getting up, removing clothing, and actively cooling down, a process that can take up to 30 minutes.  DIABETES MANAGEMENT:  Her recent A1C was in the diabetic range. She is not currently taking any medications for diabetes and declines adding medication to address this. She has been monitoring her carbohydrate intake and has reduced her sugar consumption to manage her glucose levels.  IMMUNIZATIONS:  She reports receiving immunizations at her living facility, [facility].  UPCOMING APPOINTMENTS:  She has appointments scheduled with Jo-Ann Jacobo, Dr. Conley, Dr. Kelly, Dr. Sanchez, and neurologist Dr. Mary Zepeda. She expresses skepticism about having neurological issues and voices concerns about potential EMG testing due to her A-fib.  PHQ-4 Score: 0     Recent appointments:   12/03/24 Pall  Angela  11/19/24 Neuro Katelyn  11/13/24 Cards Robin  11/04/24 Cards Donnell    External appointments:  Dec Ophthal [facility] glacuoma [facility]  Dec Ophthal [facility] tissue transplant [facility]    Upcoming appointments:  Future Appointments       Date Provider Specialty Appt Notes    1/10/2025 Priti Prescott MD Ophthalmology IOP ck and mrx refitting    1/27/2025 Jo-Ann Jacobo PA-C Cardiology 2m    2/21/2025 Shahana Louie FNP-C Primary Care [facility]    3/11/2025 Heather Miller NP Palliative Medicine Heather's 3 months    4/25/2025 Phylicia Deleon MD Primary Care 8 WK F/U    12/10/2025 Toi Kelly MD Cardiology 1 yr// annual           The following were reviewed: Active problem list, medication list, allergies, family history, social history, and Health Maintenance.     Medications have been reviewed and reconciled with patient at visit today.    Exam: sat 97%  Vitals:    01/03/25 0950   BP: 110/74   Pulse: 65   Temp: 96.9 °F (36.1 °C)     Weight: 81.9 kg (180 lb 8.9 oz)   Body mass index is 30.99 kg/m².    BP Readings from Last 3 Encounters:   01/03/25 110/74   12/03/24 109/71   11/19/24 114/72      Wt Readings from Last 3 Encounters:   01/03/25 0950 81.9 kg (180 lb 8.9 oz)   12/03/24 1104 81 kg (178 lb 9.2 oz)   11/19/24 1043 81 kg (178 lb 9.2 oz)      Physical Exam  Vitals reviewed.   Constitutional:       General: She is not in acute distress.     Appearance: Normal appearance. She is obese.   HENT:      Head: Normocephalic and atraumatic.      Right Ear: External ear normal.      Left Ear: External ear normal.      Nose: Nose normal.      Mouth/Throat:      Mouth: Mucous membranes are moist.      Pharynx: Oropharynx is clear.   Eyes:      Extraocular Movements: Extraocular movements intact.      Conjunctiva/sclera: Conjunctivae normal.      Comments: glasses   Cardiovascular:      Rate and Rhythm: Normal rate.   Pulmonary:      Effort: Pulmonary effort is normal. No respiratory distress.  "  Musculoskeletal:      Right lower leg: No edema.      Left lower leg: No edema.   Skin:     General: Skin is warm and dry.   Neurological:      Mental Status: She is alert and oriented to person, place, and time. Mental status is at baseline.      Gait: Gait abnormal.   Psychiatric:         Mood and Affect: Mood normal.         Behavior: Behavior normal.        Laboratory Reviewed  Lab Results   Component Value Date    WBC 7.07 09/23/2024    HGB 13.1 09/23/2024    HCT 39.9 09/23/2024     09/23/2024     (H) 09/23/2024    CHOL 170 08/04/2023    TRIG 116 08/04/2023    HDL 40 08/04/2023    LDLCALC 106.8 08/04/2023    ALT 13 09/23/2024    AST 19 09/23/2024     09/23/2024    K 3.9 09/23/2024     09/23/2024    CREATININE 2.0 (H) 09/23/2024    BUN 44 (H) 09/23/2024    CO2 29 09/23/2024    MG 2.7 (H) 05/20/2024    TSH 3.577 04/27/2023    FREET4 1.03 04/27/2023    INR 1.1 10/25/2022    HGBA1C 6.9 (H) 09/23/2024    CRP 2.1 10/30/2023     Lab Results   Component Value Date    .2 (H) 04/27/2023    CALCIUM 9.4 09/23/2024    PHOS 4.0 01/08/2024      Lab Results   Component Value Date    WJYTZJKO48 334 09/23/2024     Lab Results   Component Value Date    FOLATE 13.5 09/23/2024    No results found for: "UIBC", "IRON", "TRANS", "TRANSFERRIN", "TIBC", "LABIRON", "FESATURATED"   Lab Results   Component Value Date    EGFRNORACEVR 23.2 (A) 09/23/2024    ALBUMIN 3.7 09/23/2024     (H) 04/27/2023     Lab Results   Component Value Date    [id number] 51 11/13/2017          Assessment:   89 y.o. female with multiple co-morbid illnesses here for continued follow up of medical problems.      The primary encounter diagnosis was Atrial fibrillation with RVR. 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      Plan:   1. Atrial fibrillation with RVR    2. Type 2 diabetes mellitus with stage 4 chronic kidney disease, without long-term current use of insulin    3. 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 Primary osteoarthritis involving multiple joints    5. Frailty syndrome in geriatric patient    6. VIRY positive  Overview:  1/24/22 VIRY+    Assessment & Plan:  Seen by Rheum Dr. Jiménez in the past - Ms. Bettencourt unsure if further f/u would be of benefit given her multiple medication allergies and sensitivities      7. Abnormality of gait and mobility    8. Pulmonary granuloma  Overview:  PET CT 2021 infectious vs granulomatous  Subsequent CT chest 2021 and 2022 multiple pulm nodules noted    Assessment & Plan:  Declines further CT imaging - declines further f/u w Pulm           Health Maintenance         Date Due Completion Date    Shingles Vaccine (1 of 2) Never done ---    TETANUS VACCINE 11/09/2021 11/9/2011    Lipid Panel 08/04/2024 8/4/2023    COVID-19 Vaccine (6 - 2024-25 season) 09/13/2024 7/19/2024    Hemoglobin A1c 03/23/2025 9/23/2024    Eye Exam 08/05/2025 8/5/2024 (Done)    Override on 8/5/2024: Done (Dr Prescott)    Diabetes Urine Screening 10/16/2025 10/16/2024          Declines shingles and tetanus vaccines     -Patient's lab results were reviewed and discussed with patient  -Treatment options and alternatives were discussed with the patient. Patient expressed understanding. Patient was given the opportunity to ask questions and be an active participant in their medical care. Patient had no further questions or concerns at this time.     Follow up: Follow up in about 8 weeks (around  2/28/2025) for Follow Up.    After visit summary printed and given to patient upon discharge.  Patient care plan included in After visit summary.    TOTAL TIME evaluating and managing this patient for this encounter was 50 minutes. This time was spent personally by me on some of the following activities: review of patient's past medical history, assessing age-appropriate health maintenance needs, review of any interval history, review and interpretation of lab results, review and interpretation of imaging test results, review and interpretation of cardiology test results, reviewing consulting specialist notes, obtaining history from the patient and family, examination of the patient, medication reconciliation, managing and/or ordering prescription medications, ordering imaging tests, ordering referral to subspecialty provider(s), educating patient and answering their questions about diagnosis, treatment plan, and goals of treatment, discussing planned follow-up and final documentation of the visit. This time was exclusive of any separately billable procedures for this patient and exclusive of time spent treating any other patients.     This note was generated with the assistance of ambient listening technology. Verbal consent was obtained by the patient and accompanying visitor(s) for the recording of patient appointment to facilitate this note. I attest to having reviewed and edited the generated note for accuracy, though some syntax or spelling errors may persist. Please contact the author of this note for any clarification.

## 2025-01-03 ENCOUNTER — OFFICE VISIT (OUTPATIENT)
Dept: PRIMARY CARE CLINIC | Facility: CLINIC | Age: OVER 89
End: 2025-01-03
Payer: MEDICARE

## 2025-01-03 VITALS
TEMPERATURE: 97 F | DIASTOLIC BLOOD PRESSURE: 74 MMHG | HEIGHT: 64 IN | WEIGHT: 180.56 LBS | OXYGEN SATURATION: 97 % | SYSTOLIC BLOOD PRESSURE: 110 MMHG | BODY MASS INDEX: 30.83 KG/M2 | HEART RATE: 65 BPM

## 2025-01-03 DIAGNOSIS — E11.22 TYPE 2 DIABETES MELLITUS WITH STAGE 4 CHRONIC KIDNEY DISEASE, WITHOUT LONG-TERM CURRENT USE OF INSULIN: Chronic | ICD-10-CM

## 2025-01-03 DIAGNOSIS — I13.2 HYPERTENSIVE HEART AND RENAL DISEASE WITH BOTH (CONGESTIVE) HEART FAILURE AND RENAL FAILURE: Chronic | ICD-10-CM

## 2025-01-03 DIAGNOSIS — N18.4 TYPE 2 DIABETES MELLITUS WITH STAGE 4 CHRONIC KIDNEY DISEASE, WITHOUT LONG-TERM CURRENT USE OF INSULIN: Chronic | ICD-10-CM

## 2025-01-03 DIAGNOSIS — R26.9 ABNORMALITY OF GAIT AND MOBILITY: Chronic | ICD-10-CM

## 2025-01-03 DIAGNOSIS — R76.8 ANA POSITIVE: ICD-10-CM

## 2025-01-03 DIAGNOSIS — I48.91 ATRIAL FIBRILLATION WITH RVR: Primary | Chronic | ICD-10-CM

## 2025-01-03 DIAGNOSIS — R54 FRAILTY SYNDROME IN GERIATRIC PATIENT: Chronic | ICD-10-CM

## 2025-01-03 DIAGNOSIS — M15.0 PRIMARY OSTEOARTHRITIS INVOLVING MULTIPLE JOINTS: Chronic | ICD-10-CM

## 2025-01-03 DIAGNOSIS — J84.10 PULMONARY GRANULOMA: ICD-10-CM

## 2025-01-03 DIAGNOSIS — N19 HYPERTENSIVE HEART AND RENAL DISEASE WITH BOTH (CONGESTIVE) HEART FAILURE AND RENAL FAILURE: Chronic | ICD-10-CM

## 2025-01-03 PROBLEM — Z51.5 ENCOUNTER FOR PALLIATIVE CARE: Status: RESOLVED | Noted: 2024-08-08 | Resolved: 2025-01-03

## 2025-01-03 PROCEDURE — 1125F AMNT PAIN NOTED PAIN PRSNT: CPT | Mod: HCNC,CPTII,S$GLB, | Performed by: INTERNAL MEDICINE

## 2025-01-03 PROCEDURE — 1101F PT FALLS ASSESS-DOCD LE1/YR: CPT | Mod: HCNC,CPTII,S$GLB, | Performed by: INTERNAL MEDICINE

## 2025-01-03 PROCEDURE — 99215 OFFICE O/P EST HI 40 MIN: CPT | Mod: HCNC,S$GLB,, | Performed by: INTERNAL MEDICINE

## 2025-01-03 PROCEDURE — 1159F MED LIST DOCD IN RCRD: CPT | Mod: HCNC,CPTII,S$GLB, | Performed by: INTERNAL MEDICINE

## 2025-01-03 PROCEDURE — 3288F FALL RISK ASSESSMENT DOCD: CPT | Mod: HCNC,CPTII,S$GLB, | Performed by: INTERNAL MEDICINE

## 2025-01-03 PROCEDURE — 99999 PR PBB SHADOW E&M-EST. PATIENT-LVL IV: CPT | Mod: PBBFAC,HCNC,, | Performed by: INTERNAL MEDICINE

## 2025-01-03 RX ORDER — FUROSEMIDE 40 MG/1
TABLET ORAL
Qty: 90 TABLET | Refills: 1 | Status: SHIPPED | OUTPATIENT
Start: 2025-01-03

## 2025-01-03 NOTE — ASSESSMENT & PLAN NOTE
Seen by Rheum Dr. Jiménez in the past - Ms. Bettencourt unsure if further f/u would be of benefit given her multiple medication allergies and sensitivities

## 2025-01-03 NOTE — PATIENT INSTRUCTIONS
Progress Note      Patient Name: Kelin Streeter   Patient ID: [id number]   Sex: Female   YOB: 1990    Primary Care Provider: Page NOWAK   Referring Provider: Page NOWAK    Visit Date: July 30, 2020    Provider: SHE Gray   Location: [facility]   Location Address: [address] [place] [address], [address]  [place], [place]  [id number]   Location Phone: [phone]          Chief Complaint  · Anxiety  · Discuss blood thinner     Patient is here to discuss her increase anxiety due to the pandemic and her health issues.    Wants to discuss being put on a blood thinner to help easy her mind of h/o of PE's.    C/o a bruise on the back of her leg.       History Of Present Illness  Kelin Streeter is a 30 year old /White female who presents for evaluation and treatment of:      patient presents in the office today and she is having anx related to school starting and she would like medication  h/o clots PE thought from OCP had hysterectomy and was taken off  c/o leg hurting and going to sleep when sitting she was off her thyroid for a couple months during covid she could not get her meds from her endocrinologist       Past Medical History  Disease Name Date Onset Notes   DVT (deep venous thrombosis) --  --    Hyperlipemia --  --    Hypothyroid --  --    Pap smear for cervical cancer screening 3/2019 --    Pulmonary emboli --  --          Past Surgical History  Procedure Name Date Notes   Cesarian Section 2012 --    Cholecystectomy 2007 --    Cyst Removal 2009 SINUS   IUD Removal 2012 --          Medication List  Name Date Started Instructions   rosuvastatin 5 mg oral tablet 12/02/2019 TAKE 1 TABLET BY MOUTH EVERYDAY AT BEDTIME   Synthroid 50 mcg oral tablet  take 1 tablet (50 mcg) by oral route once daily   Zyrtec 10 mg oral tablet  take 1 tablet (10 mg) by oral route once daily         Allergy List  Allergen Name Date Reaction Notes   NO KNOWN DRUG ALLERGIES --  --  --   If you are feeling unwell, we'd like to be the first ones to know here at [facility]! Please give us a call. Same day appointments are our top priority to keep you well and out of the emergency rooms and hospitals. Call [phone] for our direct line. After hours advice is always available. Please call [phone] after hours to speak to the on-call team.      If heart rate is over 110 then take metoprolol 100 mg twice daily.  If your blood pressure is low at this time take midodrine with metoprolol to support your blood pressure.      In other words, do not hold or take lower dose of metoprolol because your blood pressure is low. Instead, take midodrine to support your blood pressure while the metoprolol lowers your heart rate.         Please contact Rheumatology Dr. Jiménez to schedule f/u appointment sometime soon!           Family Medical History  Disease Name Relative/Age Notes   Breast Neoplasm, Malignant Grandmother (paternal)/   --    Lung Neoplasm, Malignant Grandmother (paternal)/   --    Cervical Neoplasm, Malignant Mother/   --    Skin Carcinoma In Situ Mother/   --    Hyperlipidemia Father/   --    Prostate cancer Uncle/   Paternal Uncle   -Mother's Family History Unknown  pt states mother was adopted         Reproductive History  Menstrual   Age Menarche: 11   Pregnancy Summary   Total Pregnancies: 2 Full Term: 1 Premature: 0   Ab Induced: 0 Ab Spontaneous: 0 Ectopics: 0   Multiples: 0 Livin         Social History  Finding Status Start/Stop Quantity Notes   Active but no formal exercise --  --/-- --  --    Alcohol Current some day --/-- occ 2020 -     --  --/-- --  --    No known infection risk --  --/-- --  --    Tobacco Never --/-- --  12/15/2017 - never          Immunizations  NameDate Admin Mfg Trade Name Lot Number Route Inj VIS Given VIS Publication   Cuyipnntj20/2018 SK[employer] Fluzone Quadrivalent  NE NE 2019    Comments:    Hdkxgeyrg61/01/2015 NE Not Entered  NE NE 2016    Comments: states recieved in 2015 per patient.   Eiganrcrp02/2014 NE Not Entered 2A2KX NE NE 2015   Comments: per pt   Twqnduanx30/2011 [employer] Fluzone Quadrivalent  NE NE 2019    Comments: outside facility   Tdap2011 SKB BOOSTRIX  NE NE 2019    Comments:          Review of Systems  · Constitutional  o Denies  o : fever, weight gain, weight loss, malaise/fatigue  · Eyes  o Denies  o : diplopia, recent changes, blurred vision, redness of eye, eye pain, discharge from eye  · HENT  o Denies  o : sore throat, hearing changes, tinnitus, nose bleeding, sinus pain, nasal discharge, ear pain  · Breasts  o Denies  o : lumps, tenderness, swelling, nipple discharge  · Cardiovascular  o Denies  o : palpitation, chest pain, claudication, pedal edema  · Respiratory  o Denies  o : shortness  "of breath, RASHID, PND/Orthopnea, hemoptysis, dry cough, productive cough  · Gastrointestinal  o Denies  o : nausea, vomiting, reflux, diarrhea, constipation, abdominal pain, blood in stools  · Genitourinary  o Denies  o : frequency, urgency, dysuria, vaginal discharge, penile discharge, incontinence, nocturia, irregular menses, hot flashes  · Neurologic  o Denies  o : unsteady gait, weakness, dizziness, H/A  · Musculoskeletal  o Denies  o : myalgias, joint pain, joint swelling  · Endocrine  o Denies  o : heat intolerance, cold intolerance, polyuria, polydipsia  · Psychiatric  o Denies  o : suicidal ideation, homicidal ideation, mood changes, hallucinations, memory  · Heme-Lymph  o Denies  o : easy bleeding, easy bruising, edema, lymph node enlargement or tenderness  · Allergic-Immunologic  o Denies  o : bees, frequent illnesses, seasonal allergies      Vitals  Date Time BP Position Site L\R Cuff Size HR RR TEMP (F) WT  HT  BMI kg/m2 BSA m2 O2 Sat HC       07/30/2020 12:07 /68 Sitting    72 - R   141lbs 0oz 5'  3\" 24.98 1.69           Physical Examination  · Constitutional  o Appearance  o : well-nourished, well developed, alert, in no acute distress  · Ears, Nose, Mouth and Throat  o Ears  o :   § External Ears  § : appearance within normal limits, no lesions present  § Otoscopic Examination  § : tympanic membrane appearance within normal limits bilaterally without perforations, mobility normal  o Nose  o :   § External Nose  § : normal stucture noted.  § Intranasal Exam  § : no swelling, reddness, turbs normal preet.  o Oral Cavity  o :   § Oral Mucosa  § : oral mucosa normal without pallor or cyanosis  § Lips  § : lip appearance normal  § Teeth  § : normal dentition for age  § Gums  § : gums pink, non-swollen, no bleeding present  § Tongue  § : tongue appearance normal  § Palate  § : hard palate normal, soft palate appearance normal  o Throat  o :   § Oropharynx  § : no inflammation or lesions present, tonsils " within normal limits  · Respiratory  o Respiratory Effort  o : breathing unlabored  o Auscultation of Lungs  o : normal breath sounds throughout  · Cardiovascular  o Heart  o :   § Auscultation of Heart  § : regular rate and rhythm, no murmurs, gallops or rubs  § Palpation of Heart  § : normal apical impulse, no cardiac thrill present  o Peripheral Vascular System  o :   § Carotid Arteries  § : normal pulses bilaterally, no bruits present  § Pedal Pulses  § : pulses 2 bilaterally  § Extremities  § : no cyanosis, clubbing or edema; less than 2 second refill noted  · Gastrointestinal  o Abdominal Examination  o : abdomen nontender to palpation, tone normal without rigidity or guarding, no masses present, abdominal contour scaphoid  o Liver and spleen  o : no hepatomegaly present, liver nontender to palpation, spleen not palpable  · Musculoskeletal  o Right Upper Extremity  o :   § Inspection/Palpation  § : no tenderness to palpation  § Range of Motion  § : range of motion normal, no joint crepitus or pain with motion present  o Left Upper Extremity  o :   § Inspection/Palpation  § : no tenderness to palpation  § Range of Motion  § : range of motion normal, no joint crepitus present, no pain with joint motion  o Right Lower Extremity  o :   § Inspection/Palpation  § : no joint or limb tenderness to palpation  § Range of Motion  § : range of motion normal, no joint crepitations present, no pain on motion  o Left Lower Extremity  o :   § Inspection/Palpation  § : no joint or limb tenderness to palpation  § Range of Motion  § : range of motion normal, no joint crepitations present, no pain on motion  · Skin and Subcutaneous Tissue  o General Inspection  o : no rashes or lesions present, no areas of discoloration  · Neurologic  o Mental Status Examination  o :   § Orientation  § : grossly oriented to person, place and time  o Cranial Nerves  o : cranial nerves intact and symmetric throughout  · Psychiatric  o Mood and  Affect  o : mood normal, affect appropriate, denies any SI/HI          Assessment  · Anxiety disorder     300.00/F41.9  · Neuropathy     355.9/G62.9  · B12 deficiency     266.2/E53.8      Plan  · Orders  o Vitamin D (25-Hydroxy) Level (54371) - 300.00/F41.9, 355.9/G62.9, 266.2/E53.8 - 07/30/2020  o ACO-39: Current medications updated and reviewed () - - 07/30/2020  o B12 Folate levels (B12FO) - 300.00/F41.9, 355.9/G62.9, 266.2/E53.8 - 07/30/2020  o Iron Profile (Iron 52697 TIBC 50676 and Transferrin 27517) (IRONP) - 300.00/F41.9, 355.9/G62.9, 266.2/E53.8 - 07/30/2020  · Medications  o Pristiq 25 mg oral tablet extended release 24 hr   SIG: take 1 tablet by oral route daily for 90 days   DISP: (90) tablets with 1 refills  Prescribed on 07/30/2020     o Medications have been Reconciled  o Transition of Care or Provider Policy  · Instructions  o Patient was educated/instructed on their diagnosis, treatment and medications prior to discharge from the clinic today.            Electronically Signed by: admin admin, OT -Author on August 27, 2020 09:38:24 PM

## 2025-01-04 DIAGNOSIS — F41.9 ANXIETY: ICD-10-CM

## 2025-01-06 PROBLEM — J84.10 PULMONARY GRANULOMA: Chronic | Status: ACTIVE | Noted: 2021-06-02

## 2025-01-06 NOTE — ASSESSMENT & PLAN NOTE
Reviewed w Ms. Bettencourt again instructions from cardiology and NP Angela to take 4 of the metoprolol 25 mg tabs if heart rate > 110 and to take with midodrine if BP is low

## 2025-01-09 DIAGNOSIS — F41.9 ANXIETY: ICD-10-CM

## 2025-01-09 RX ORDER — ALPRAZOLAM 0.5 MG/1
0.5 TABLET ORAL NIGHTLY PRN
Qty: 30 TABLET | Refills: 0 | OUTPATIENT
Start: 2025-01-09 | End: 2025-04-09

## 2025-01-09 RX ORDER — ALPRAZOLAM 0.5 MG/1
0.5 TABLET ORAL NIGHTLY PRN
Qty: 30 TABLET | Refills: 0 | Status: SHIPPED | OUTPATIENT
Start: 2025-01-09 | End: 2025-04-09

## 2025-01-13 ENCOUNTER — OFFICE VISIT (OUTPATIENT)
Dept: OPHTHALMOLOGY | Facility: CLINIC | Age: OVER 89
End: 2025-01-13
Payer: MEDICARE

## 2025-01-13 DIAGNOSIS — H40.1131 PRIMARY OPEN ANGLE GLAUCOMA (POAG) OF BOTH EYES, MILD STAGE: ICD-10-CM

## 2025-01-13 DIAGNOSIS — H52.7 REFRACTIVE ERROR: ICD-10-CM

## 2025-01-13 DIAGNOSIS — Z94.7 HISTORY OF CORNEA TRANSPLANT: Primary | Chronic | ICD-10-CM

## 2025-01-13 DIAGNOSIS — H26.9: ICD-10-CM

## 2025-01-13 DIAGNOSIS — H16.143 PUNCTATE KERATITIS OF BOTH EYES: ICD-10-CM

## 2025-01-13 DIAGNOSIS — Z96.1 PSEUDOPHAKIA: ICD-10-CM

## 2025-01-13 PROCEDURE — 1159F MED LIST DOCD IN RCRD: CPT | Mod: HCNC,CPTII,S$GLB, | Performed by: OPTOMETRIST

## 2025-01-13 PROCEDURE — 99214 OFFICE O/P EST MOD 30 MIN: CPT | Mod: HCNC,S$GLB,, | Performed by: OPTOMETRIST

## 2025-01-13 PROCEDURE — 1160F RVW MEDS BY RX/DR IN RCRD: CPT | Mod: HCNC,CPTII,S$GLB, | Performed by: OPTOMETRIST

## 2025-01-13 PROCEDURE — 99999 PR PBB SHADOW E&M-EST. PATIENT-LVL III: CPT | Mod: PBBFAC,HCNC,, | Performed by: OPTOMETRIST

## 2025-01-13 NOTE — PROGRESS NOTES
HPI     Follow-up            Comments: Patient here today for RX check with IOP check   Vision stable  C/O dryness OU          Comments    1. Mild COAG Goal < 19   SLT OD 3/04 (20 to 15) + 3/11 (19 to 15)   SLT OS 5/05 (20 to 14) +5/11 (18-19 to 15) + 3/12 (min resp.) + 3/11/15   (20.5-17.5) + 3/7/18 (24- 21) + 11/3/21 (22.5-17.5)  (Cosopt, Xalatan, and Timolol cause Myalgias)   (Alphagan causes redness) (zioptan too expensive)   Possible steroid responder   Xen Gel OS 12/06/23  Ahmed Valve OS with Dr. Gomez 2/13/2024  2. PCIOL OU   3. Mild Dry AMD   4. Fuch's Dystrophy   DSAEK OD 4/16 Dr. Quintanilla (followed by [facility] every summer)   DSAEK OS 8/15 Dr. Quintanilla   Rx Inveltys (1% lotemax) (IOP 15/23) 5/29/20   Dr. Quintanilla Ok'd to stop Lotemax OU  5. Dry Eye -intolerance to Restasis   6. Recent A-Fib. (from Labetolol ?)     >>>>Lotemax BID OS - Allergic, very weak, pain throughout body    Systane Ultra 4-5 tiimes daily   Travatan Z qhs os Discontinue  Rhopressa qhs OS Discontinue  Azopt qam OS Discontinue  Timolol BID OS Discontinue    OS: Vyzulta QHS  OS: Brimonidine BID- discontinue    +HAG             Last edited by Estrella Arriaga, PCT on 1/13/2025  8:32 AM.            Assessment /Plan     For exam results, see Encounter Report.    1. History of cornea transplant  S/p DSAEK with Dr Quintanilla, pt complains of intermittent monocular diplopia OS and blurred vision  Minimal improvement with Mrx OS,   Consider RGP potential eval due to irregularity noted on K yifan OS,   pt deferred RGP at this time  Mrx provided PRN.      2. Anterior opacification of left lens  Observe per Dr Quintanilla at this time.     3. Primary open angle glaucoma (POAG) of both eyes, mild stage  IOP at target pressure, continue per Dr. Aleksandr Conley due to Dr Ware retiring.     4. Punctate keratitis of both eyes  OS>OD  Continue OTC genteal gel at bedtime with refresh tears 3-4 times daily throughout the day.     5. Pseudophakia  Doing well OU,  observe.     6. Refractive error  Eyeglass Final Rx       Eyeglass Final Rx         Sphere Cylinder Axis Add    Right -1.25 +1.25 058 +2.50    Left -2.00 +3.00 140 +2.50      Type: PAL    Expiration Date: 1/13/2026                     RTC as schedule with Dr. Quintanilla, and Dr Glez as scheduled.  Discussed above and answered questions.

## 2025-01-27 ENCOUNTER — OFFICE VISIT (OUTPATIENT)
Dept: CARDIOLOGY | Facility: CLINIC | Age: OVER 89
End: 2025-01-27
Payer: MEDICARE

## 2025-01-27 VITALS
DIASTOLIC BLOOD PRESSURE: 78 MMHG | WEIGHT: 181.44 LBS | BODY MASS INDEX: 30.98 KG/M2 | HEART RATE: 112 BPM | SYSTOLIC BLOOD PRESSURE: 124 MMHG | HEIGHT: 64 IN

## 2025-01-27 DIAGNOSIS — E78.2 MIXED HYPERLIPIDEMIA: ICD-10-CM

## 2025-01-27 DIAGNOSIS — I73.9 PAD (PERIPHERAL ARTERY DISEASE): ICD-10-CM

## 2025-01-27 DIAGNOSIS — I13.2 HYPERTENSIVE HEART AND RENAL DISEASE WITH BOTH (CONGESTIVE) HEART FAILURE AND RENAL FAILURE: Chronic | ICD-10-CM

## 2025-01-27 DIAGNOSIS — I48.92 ATRIAL FLUTTER WITH RAPID VENTRICULAR RESPONSE: Primary | ICD-10-CM

## 2025-01-27 DIAGNOSIS — I11.9 HYPERTENSIVE LEFT VENTRICULAR HYPERTROPHY, WITHOUT HEART FAILURE: Chronic | ICD-10-CM

## 2025-01-27 DIAGNOSIS — I10 PRIMARY HYPERTENSION: Chronic | ICD-10-CM

## 2025-01-27 DIAGNOSIS — I70.0 CALCIFICATION OF AORTA: Chronic | ICD-10-CM

## 2025-01-27 DIAGNOSIS — Z51.5 PALLIATIVE CARE STATUS: ICD-10-CM

## 2025-01-27 DIAGNOSIS — Z78.9 STATIN INTOLERANCE: Chronic | ICD-10-CM

## 2025-01-27 DIAGNOSIS — N19 HYPERTENSIVE HEART AND RENAL DISEASE WITH BOTH (CONGESTIVE) HEART FAILURE AND RENAL FAILURE: Chronic | ICD-10-CM

## 2025-01-27 DIAGNOSIS — R94.31 ABNORMAL ECG: ICD-10-CM

## 2025-01-27 DIAGNOSIS — I49.3 PVC'S (PREMATURE VENTRICULAR CONTRACTIONS): ICD-10-CM

## 2025-01-27 DIAGNOSIS — I12.9 CKD STAGE 4 SECONDARY TO HYPERTENSION: Chronic | ICD-10-CM

## 2025-01-27 DIAGNOSIS — N18.4 CKD STAGE 4 SECONDARY TO HYPERTENSION: Chronic | ICD-10-CM

## 2025-01-27 PROCEDURE — 99999 PR PBB SHADOW E&M-EST. PATIENT-LVL IV: CPT | Mod: PBBFAC,HCNC,, | Performed by: PHYSICIAN ASSISTANT

## 2025-01-27 PROCEDURE — 1159F MED LIST DOCD IN RCRD: CPT | Mod: HCNC,CPTII,S$GLB, | Performed by: PHYSICIAN ASSISTANT

## 2025-01-27 PROCEDURE — 1101F PT FALLS ASSESS-DOCD LE1/YR: CPT | Mod: HCNC,CPTII,S$GLB, | Performed by: PHYSICIAN ASSISTANT

## 2025-01-27 PROCEDURE — 1160F RVW MEDS BY RX/DR IN RCRD: CPT | Mod: HCNC,CPTII,S$GLB, | Performed by: PHYSICIAN ASSISTANT

## 2025-01-27 PROCEDURE — 99214 OFFICE O/P EST MOD 30 MIN: CPT | Mod: HCNC,S$GLB,, | Performed by: PHYSICIAN ASSISTANT

## 2025-01-27 PROCEDURE — 3288F FALL RISK ASSESSMENT DOCD: CPT | Mod: HCNC,CPTII,S$GLB, | Performed by: PHYSICIAN ASSISTANT

## 2025-01-27 PROCEDURE — 1125F AMNT PAIN NOTED PAIN PRSNT: CPT | Mod: HCNC,CPTII,S$GLB, | Performed by: PHYSICIAN ASSISTANT

## 2025-01-27 NOTE — PROGRESS NOTES
Subjective   Patient ID:  Shirley Metz is a 89 y.o. female who presents for follow-up of afib/aflutter, diastolic CHF, PVD    HPI  Ms. Sg Metz is a 89 year old female patient whose current medical conditions include afib/aflutter s/p prior PVI (on Eliquis) and failed STACEY/DCCV 3/23 (did not maintain SR), glaucoma, chronic diastolic CHF, PAD, carotid artery disease, and CKD who presents today for routine follow-up. She returns today and states she is doing ok. States she has been having issues with elevated pulse readings and low BP readings. Recently had to cut back on Metoprolol dosages due to running low on medication, resumed 75 mg BID on 1/24/25. She has taken an extra 25 mg dose over the weekend which did seem to help. Other than that, seems to be doing ok CV wise. No unusual SOB/RUIZ. She has noticed some increased ankle edema, stays on Lasix and uses metolazone prn. No PND or orthopnea. Weight staying around 170 lbs. No chest pain, heaviness, or tightness. No LH, dizziness, near syncope, or syncope. BP stable today in office, she does have some readings 90/60's on her log. Dealing with chronic joint pain in shoulders, now left shoulder is starting to bother her. Still also recovering from multiple eye surgeries, vision gradually improving. She is compliant her medications. No bleeding issues on Eliquis. No s/s of TIA/CVA. Current with EP, Dr. Kelly. Does not want additional medications/procedures.     EF by TTE 3/23 showed normal EF, patent PFO.      Review of Systems   Constitutional: Negative for chills, decreased appetite, fever and malaise/fatigue.   HENT:  Negative for congestion, hoarse voice and sore throat.    Eyes:  Positive for blurred vision (chronic). Negative for discharge.   Cardiovascular:  Negative for chest pain, claudication, cyanosis, dyspnea on exertion, irregular heartbeat, leg swelling, near-syncope, orthopnea, palpitations and paroxysmal nocturnal dyspnea.    Respiratory:  Negative for cough, hemoptysis, shortness of breath, snoring, sputum production and wheezing.    Endocrine: Negative for cold intolerance and heat intolerance.   Hematologic/Lymphatic: Negative for bleeding problem. Does not bruise/bleed easily.   Skin:  Negative for rash.   Musculoskeletal:  Positive for arthritis and joint pain. Negative for back pain, joint swelling, muscle cramps, muscle weakness and myalgias.   Gastrointestinal:  Negative for abdominal pain, constipation, diarrhea, heartburn, melena and nausea.   Genitourinary:  Negative for hematuria.   Neurological:  Positive for weakness. Negative for dizziness, focal weakness, headaches, light-headedness, loss of balance, numbness, paresthesias and seizures.   Psychiatric/Behavioral:  Negative for memory loss. The patient does not have insomnia.    Allergic/Immunologic: Negative for hives.          Objective     Physical Exam  Vitals and nursing note reviewed.   Constitutional:       General: She is not in acute distress.     Appearance: Normal appearance. She is well-developed. She is not diaphoretic.      Comments: Uses walker   HENT:      Head: Normocephalic and atraumatic.   Eyes:      General:         Right eye: No discharge.         Left eye: No discharge.      Pupils: Pupils are equal, round, and reactive to light.   Cardiovascular:      Rate and Rhythm: Tachycardia present. Rhythm irregularly irregular.      Heart sounds: Normal heart sounds, S1 normal and S2 normal. No murmur heard.  Pulmonary:      Effort: Pulmonary effort is normal. No respiratory distress.      Breath sounds: Normal breath sounds. No wheezing or rales.   Abdominal:      General: There is no distension.   Musculoskeletal:      Right lower leg: Edema present.      Left lower leg: Edema present.   Skin:     General: Skin is warm and dry.      Findings: No erythema.   Neurological:      Mental Status: She is alert and oriented to person, place, and time.    Psychiatric:         Mood and Affect: Mood normal.         Behavior: Behavior normal.         Thought Content: Thought content normal.         Chemistry        Component Value Date/Time     09/23/2024 1051    K 3.9 09/23/2024 1051     09/23/2024 1051    CO2 29 09/23/2024 1051    BUN 44 (H) 09/23/2024 1051    CREATININE 2.0 (H) 09/23/2024 1051     (H) 09/23/2024 1051        Component Value Date/Time    CALCIUM 9.4 09/23/2024 1051    ALKPHOS 70 09/23/2024 1051    AST 19 09/23/2024 1051    ALT 13 09/23/2024 1051    BILITOT 0.6 09/23/2024 1051    ESTGFRAFRICA 30 (A) 07/26/2022 1348    EGFRNONAA 26 (A) 07/26/2022 1348        Lab Results   Component Value Date    WBC 7.07 09/23/2024    HGB 13.1 09/23/2024    HCT 39.9 09/23/2024     (H) 09/23/2024     09/23/2024       Lab Results   Component Value Date    CHOL 170 08/04/2023    CHOL 168 08/31/2022    CHOL 232 (H) 07/19/2021     Lab Results   Component Value Date    HDL 40 08/04/2023    HDL 43 08/31/2022    HDL 48 07/19/2021     Lab Results   Component Value Date    LDLCALC 106.8 08/04/2023    LDLCALC 105.4 08/31/2022    LDLCALC 155.8 07/19/2021     Lab Results   Component Value Date    TRIG 116 08/04/2023    TRIG 98 08/31/2022    TRIG 141 07/19/2021       Lab Results   Component Value Date    CHOLHDL 23.5 08/04/2023    CHOLHDL 25.6 08/31/2022    CHOLHDL 20.7 07/19/2021          Assessment and Plan     1. Atrial flutter with rapid ventricular response    2. Abnormal ECG    3. Calcification of aorta    4. Primary hypertension    5. 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 Hypertensive left ventricular hypertrophy, without heart failure    7. Mixed hyperlipidemia    8. PAD (peripheral artery disease)    9. PVC's (premature ventricular contractions)    10. 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 Palliative care status    12. Statin intolerance      Patient presents for f/u. Doing  ok. She has had some fluctuation in BP and pulse readings, likely in part due to decreased Metoprolol dosage. Recently resumed her normal dosage. Advised ok to use extra 25 mg prn as well as utilize Midodrine if BP drops. CHF wise, seems stable/compensated. BP controlled today in office. No bleeding issues on Eliquis. Will let PCP recheck labs at f/u.   Plan:  -Continue current medical management and risk factor modification  -Log BP and pulse readings, phone review next Friday, if still having issues, will need to increase Lopressor to 100 mg BID  -Cardiac, low salt diet  -PCP to recheck labs  -RTC 4 months with PA, can consider TTE at f/u    Visit today included increased complexity associated with the care of the episodic problem hypotension and tachycardia addressed and managing the longitudinal care of the patient due to the serious and/or complex managed problem(s) atrial flutter.

## 2025-01-29 ENCOUNTER — TELEPHONE (OUTPATIENT)
Dept: PALLIATIVE MEDICINE | Facility: CLINIC | Age: OVER 89
End: 2025-01-29
Payer: MEDICARE

## 2025-01-29 NOTE — TELEPHONE ENCOUNTER
----- Message from Yazmin sent at 1/29/2025  2:54 PM CST -----  .Type:  Sooner Apoointment Request    Caller is requesting a sooner appointment.  Caller declined first available appointment listed below.  Caller will not accept being placed on the waitlist and is requesting a message be sent to doctor.  Name of Caller:.Shirley Bettencourt Isaías   When is the first available appointment?03/11/2025  Would the patient rather a call back or a response via [facility]? Call back  Best Call Back Number: [phone]  Additional Information: Patient is requesting a sooner appointment and on a Monday, Wednesday or Friday. Thx.EL

## 2025-01-30 NOTE — PROGRESS NOTES
Episodes of not feeling well, BP decreasing and HR elevating. Managing by adjusting beta blocker dose. Taking 75mg metoprolol BID, takes additional 25 mg PRN, occasional.  this AM, took metoprolol.    Now having increased right shoulder pain. Better with topicals/heat, worse with movement. States she has discussed this previously with Dr Worthington. Cannot take steroid injections. Similar sx last summer, has exercises fromPT, plans to complete.  Lots of myalgias few weeks ago, similar to previous episodes, still some AM difficulties, now better overall.

## 2025-02-01 DIAGNOSIS — F41.9 ANXIETY: ICD-10-CM

## 2025-02-04 RX ORDER — APIXABAN 2.5 MG/1
2.5 TABLET, FILM COATED ORAL 2 TIMES DAILY
Qty: 180 TABLET | Refills: 1 | Status: SHIPPED | OUTPATIENT
Start: 2025-02-04

## 2025-02-05 ENCOUNTER — TELEPHONE (OUTPATIENT)
Dept: OPHTHALMOLOGY | Facility: CLINIC | Age: OVER 89
End: 2025-02-05
Payer: MEDICARE

## 2025-02-05 NOTE — TELEPHONE ENCOUNTER
Called [phone] 2 times vm came on left vm to call the mail number back if they are still in need of assistance     Patti       ----- Message from Ally sent at 2/5/2025  2:33 PM CST -----  Contact: [phone]  [email or web address]     Patient is calling for Medical Advice regarding:[facility]     How long has patient had these symptoms:verify Prescription     Pharmacy name and phone#:    Patient wants a call back or thru [facility]:call back     Comments:  Pam is with the pt now and they are needing to verify the prescription   Please advise patient replies from provider may take up to 48 hours.

## 2025-02-06 RX ORDER — ALPRAZOLAM 0.5 MG/1
0.5 TABLET ORAL NIGHTLY PRN
Qty: 30 TABLET | Refills: 0 | Status: SHIPPED | OUTPATIENT
Start: 2025-02-06 | End: 2025-05-07

## 2025-02-07 ENCOUNTER — TELEPHONE (OUTPATIENT)
Dept: OPHTHALMOLOGY | Facility: CLINIC | Age: OVER 89
End: 2025-02-07
Payer: MEDICARE

## 2025-02-07 NOTE — PROGRESS NOTES
Palliative Medicine Clinic Note     Chief Complaint:   Chief Complaint   Patient presents with    Pain And Symptom Management   Here for two month follow up.        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   Persistently elevated HR the past couple of weeks with increased fatigue.  Followed by cardiology and PCP  Desires limited interventon with focus on function and QOL   Continue POC.  Will message cardiology regarding persistently elevated HR         Relevant Orders    COMPREHENSIVE METABOLIC PANEL    Atrial fibrillation with RVR (Chronic)     Followed by cardiology and PCP.   HR remains around 115 bpm despite metoprolol 100 mg dosing.  Also taking midodrine with metoprolol.   Will message cardiology  Now experiencing some increased edema that seems responsive to metolazone.            Palliative Care    Encounter for palliative care     Goals of care: She would like to remain a full code presently but if resuscitation necessary she would not want to be on ventilator more than a few days. Advanced directive: Would not want any prolonged ventilator support. Would not want feeding tube placed for conditions from which she is not expected to recover. She has made her family aware of her wishes. Declined completion of living will and [place]POST  HC POA: Son Boris and daughter-in-law; they have copy of HC POA and know her HC wishes. Encouraged to bring a copy for records at [facility]  Symptoms: generalized weakness  Follow up: 2 months, sooner if needed.            Advance Care Planning   Advance Directives:   Living Will: No    [place]POST: No    Do Not Resuscitate Status: No    Medical Power of : Yes (MsLoretta Sg Metz will bring HC POA  to next appt)      Decision Making:  Patient answered questions  Goals of Care: The patient endorses that what is most important right now is to focus on avoiding the hospital, symptom/pain control, and improvement in condition but with limits to invasive therapies    Accordingly, we have decided that the best plan to meet the patient's goals includes continuing with treatment.             Thank you for involving me in the care of this patient.     No follow-ups on file.    Future Appointments   Date Time Provider Department Center   2/21/2025  1:00 PM Shahana Louie FNP-C [facility]   4/7/2025 10:30 AM Heather Miller, NP [facility] [place]   5/26/2025  1:00 PM Jo-Ann Jacobo PA-C [facility]   12/10/2025  9:45 AM Toi Kelly MD [facility]        SUBJECTIVE:      History of Present Illness / Interval History:  Shirley Metz is 89 y.o. 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 Presents to Palliative Care Clinic for physical symptoms, advance care planning,, and additional support.. Please see cardiology and PCP note for more details.     1/30/25 phone conversation:    Episodes of not feeling well, BP decreasing and HR elevating. Managing by adjusting beta blocker dose. Taking 75mg metoprolol BID, takes additional 25 mg PRN, occasional.  this AM, took metoprolol.   Now having increased right shoulder pain. Better with topicals/heat, worse with movement. States she has discussed this previously with Dr Worthington. Cannot take steroid injections. Similar sx last summer, has exercises from PT, plans to complete.  Lots of myalgias few weeks ago, similar to previous episodes, still some AM difficulties, now better overall.     1/27/25 cardiology:  Patient presents for f/u. Doing ok. She has had some fluctuation in BP and  "pulse readings, likely in part due to decreased Metoprolol dosage. Recently resumed her normal dosage. Advised ok to use extra 25 mg prn as well as utilize Midodrine if BP drops. CHF wise, seems stable/compensated. BP controlled today in office. No bleeding issues on Eliquis. Will let PCP recheck labs at f/u.   Plan:  -Continue current medical management and risk factor modification  -Log BP and pulse readings, phone review next Friday, if still having issues, will need to increase Lopressor to 100 mg BID  -Cardiac, low salt diet  -PCP to recheck labs  -RTC 4 months with PA, can consider TTE at f/u      2/10/25  History obtained from: patient and medical record.    New lenses for glasses - better vision in her "good eye."   Still difficulties reading.     Continues with episodes of low BP and elevated HR.   Weak, walking terrible. Onset about a week ago.   Very poor energy, also arthralgias bilateral shoulder, also muscle cramping, fatigue.   Today is better than yesterday. Stays hydrated.   Some increased swelling to chest and bilateral ankles.   Taking metolazone weekly then stopped. Took metolazone two days in a row this week.   This was helpful for dyspnea and edema. Felt better afterward.     Still having tachycardia despite metoprolol 100 BID.        Today we discussed symptom management and goals of care.        ROS:  Review of Systems   Respiratory:  Positive for shortness of breath.    Cardiovascular:  Negative for chest pain.   Musculoskeletal:  Positive for arthralgias (bilateral shoulders), gait problem and myalgias.       Review of Symptoms      Symptom Assessment (ESAS 0-10 Scale)  Pain:  7  Dyspnea:  3  Anxiety:  0  Nausea:  0  Depression:  0  Anorexia:  0  Fatigue:  7  Insomnia:  0  Restlessness:  0  Agitation:  0     Constipation:  Negative  Diarrhea:  Negative      Bowel Management Plan (BMP):  No      Pain Assessment:    Location(s): other      Performance Status:  60    Living Arrangements:  " Lives alone and Lives in assisted living    Psychosocial/Cultural:   See Palliative Psychosocial Note: Yes  **Primary  to Follow**  Palliative Care  Consult: Yes  Other       Location (Other): shoulders, bilateral       Location: bilateral       Quality: aching and stabbing        Quantity: 7/10 in intensity        Chronicity: Onset 5 year(s) ago, gradually worsening        Aggravating Factors: movement        Alleviating Factors: none        Associated Symptoms: myalgias      Medications:    Current Outpatient Medications:     acetaminophen (TYLENOL) 500 MG tablet, Take 500 mg by mouth nightly as needed., Disp: , Rfl:     ALPRAZolam (XANAX) 0.5 MG tablet, Take 0.5 mg by mouth 3 (three) times daily., Disp: , Rfl:     ALPRAZolam (XANAX) 0.5 MG tablet, TAKE 1 TABLET (0.5 MG TOTAL) BY MOUTH NIGHTLY AS NEEDED FOR ANXIETY., Disp: 30 tablet, Rfl: 0    b complex vitamins capsule, Take 1 capsule by mouth once daily., Disp: , Rfl:     calcium carbonate/vitamin D3 (VITAMIN D-3 ORAL), Take 4,000 Int'l Units/day by mouth once daily., Disp: , Rfl:     ELIQUIS 2.5 mg Tab, TAKE 1 TABLET (2.5 MG TOTAL) BY MOUTH 2 (TWO) TIMES DAILY., Disp: 180 tablet, Rfl: 1    furosemide (LASIX) 40 MG tablet, Take 1 tab in am and 1/2 tab in evening, Disp: 90 tablet, Rfl: 1    latanoprostene bunod (VYZULTA) 0.024 % Drop, Place 1 drop into both eyes every evening., Disp: 2.5 mL, Rfl: 12    metOLazone (ZAROXOLYN) 2.5 MG tablet, Take 1 tablet (2.5 mg total) by mouth as needed (aprx once weekly)., Disp: 12 tablet, Rfl: 3    metoprolol tartrate (LOPRESSOR) 25 MG tablet, TAKE 3 TABLETS (75 MG TOTAL) BY MOUTH 2 (TWO) TIMES DAILY., Disp: 180 tablet, Rfl: 1    midodrine (PROAMATINE) 2.5 MG Tab, Take 1 tablet (2.5 mg total) by mouth 3 (three) times daily as needed (low BP)., Disp: 90 tablet, Rfl: 11    External  database queried on 02/10/2025  by Heather [facility] :     N/A    Review of patient's allergies indicates:    Allergen Reactions    Carvedilol Swelling     Lip swelling    Cephalexin Other (See Comments)     Muscle/joint weakness unable to move     Doxycycline Shortness Of Breath, Nausea And Vomiting and Other (See Comments)     weakness    Erythromycin Other (See Comments)     Complete weakness/could not walk    Gadolinium-containing contrast media Swelling     angioedema    Hydrocodone-acetaminophen Shortness Of Breath     wheezing    Labetalol Shortness Of Breath, Nausea Only, Palpitations and Other (See Comments)     weakness    Loratadine Other (See Comments)     Double vision/spots  Other reaction(s): double vision    Losartan Other (See Comments)     weakness    Loteprednol etabonate Other (See Comments), Palpitations, Anxiety and Shortness Of Breath     Patient stated bp went up and extreme muscle weakness    Naproxen      wheezing  wheezing  wheezing  Other reaction(s): wheezing    Prednisone Other (See Comments)     Myalgias and generalized weakness, unable to walk    Statins-hmg-coa reductase inhibitors Other (See Comments)     Severe Muscle weakness  Muscle weakness  Severe Muscle weakness  Other reaction(s): severe muscle weakness    Amlodipine Other (See Comments)     Myalgias    Fenofibrate Other (See Comments)     muscle weakness      Hydralazine Other (See Comments)     muscle tremors    Loteprednol Other (See Comments)     increased BP    Macrolide antibiotics Other (See Comments)     Complete weakness/could not walk      Moxifloxacin Hives and Other (See Comments)     muscle soreness    Timolol Other (See Comments)     Blurred vision, Burning eyes, Severe muscle weakness, eye problems.      Verapamil Diarrhea     Severe diarrhea.      Hydrocortisone     Isosorbide      Tolerates Imdur    Silver sulfadiaz-foam bandage     Tetanus and diphtheria toxoids     Tetanus vaccines and toxoid     Adhesive Rash     Clonidine patch--contact dermatitis    Codeine Diarrhea and Other (See Comments)     Loss of balance        Doxazosin Palpitations    Fexofenadine Other (See Comments)     Double vision/spots       Hydrocortisone (bulk) Other (See Comments)     Only suppository/ caused muscle weakness    Latanoprost Other (See Comments)     Muscle weakness      Olopatadine Other (See Comments)     Muscle weakness          OBJECTIVE:   Physical Exam:  Vitals: Temp: 97.6 °F (36.4 °C) (02/10/25 1030)  Pulse: (!) 114 (02/10/25 1030)  BP: 110/73 (02/10/25 1030)  SpO2: 97 % (02/10/25 1030)    Physical Exam  Constitutional:       General: She is not in acute distress.     Appearance: She is not ill-appearing.   HENT:      Mouth/Throat:      Mouth: Mucous membranes are moist.   Eyes:      General: No scleral icterus.  Cardiovascular:      Rate and Rhythm: Tachycardia present. Rhythm irregular.   Pulmonary:      Effort: Pulmonary effort is normal.   Musculoskeletal:         General: No swelling.      Right lower leg: No edema.      Left lower leg: No edema.   Skin:     Coloration: Skin is not jaundiced or pale.      Findings: No bruising.   Neurological:      Mental Status: She is alert. Mental status is at baseline.      Comments: Proximal RLE weakness, at baseline   Psychiatric:         Mood and Affect: Mood normal.         Behavior: Behavior normal.         Thought Content: Thought content normal.         Judgment: Judgment normal.         Wt Readings from Last 3 Encounters:   02/10/25 1030 82.3 kg (181 lb 7 oz)   01/27/25 0922 82.3 kg (181 lb 7 oz)   01/03/25 0950 81.9 kg (180 lb 8.9 oz)     BP Readings from Last 3 Encounters:   02/10/25 110/73   01/27/25 124/78   01/03/25 110/74       Labs:  Lab Results   Component Value Date    WBC 7.07 09/23/2024    HGB 13.1 09/23/2024    HCT 39.9 09/23/2024     (H) 09/23/2024     09/23/2024       Sodium   Date Value Ref Range Status   09/23/2024 144 136 - 145 mmol/L Final     Potassium   Date Value Ref Range Status   09/23/2024 3.9 3.5 - 5.1 mmol/L Final     Glucose   Date Value Ref  Range Status   09/23/2024 167 (H) 70 - 110 mg/dL Final     BUN   Date Value Ref Range Status   09/23/2024 44 (H) 10 - 30 mg/dL Final     Creatinine   Date Value Ref Range Status   09/23/2024 2.0 (H) 0.5 - 1.4 mg/dL Final     Calcium   Date Value Ref Range Status   09/23/2024 9.4 8.7 - 10.5 mg/dL Final     Total Protein   Date Value Ref Range Status   09/23/2024 7.1 6.0 - 8.4 g/dL Final     Albumin   Date Value Ref Range Status   09/23/2024 3.7 3.5 - 5.2 g/dL Final     AST   Date Value Ref Range Status   09/23/2024 19 10 - 40 U/L Final     ALT   Date Value Ref Range Status   09/23/2024 13 10 - 44 U/L Final     eGFR   Date Value Ref Range Status   09/23/2024 23.2 (A) >60 mL/min/1.73 m^2 Final       Imaging:  X-Ray Pelvis Complete min 3 views  Narrative: EXAM:  XR PELVIS COMPLETE MIN 3 VIEWS    CLINICAL HISTORY: Pelvic pain    FINDINGS: No fracture, dislocation or other acute abnormality seen in the pelvis.  Scattered mild to moderate degenerative changes lower lumbar spine and bilateral hip joints.  Impression:  No acute disease is seen in the pelvis.  Chronic findings as detailed above.    Finalized on: 6/26/2024 12:57 PM By:  Darell Calderon MD  [facility]# [id number]      2024-06-26 12:59:51.581    [facility]  X-Ray Cervical Spine AP And Lateral  EXAM: XR CERVICAL SPINE AP LATERAL    CLINICAL HISTORY: [M54.2]-Cervicalgia. TechNotes: Attempted multiple open mouth views. Sent best image obtainable    FINDINGS:  3 views of the cervical spine.    Moderate to severe multilevel degenerative disc disease most pronounced involving the C5-T1 levels.  Alignment is maintained.  Odontoid is not well profiled.  Significant degenerative changes involving the facets.  No acute fracture.    IMPRESSION:  Multilevel degenerative changes.    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    FINDINGS:  2 views each shoulder.  Left shoulder: Reverse shoulder replacement without evidence of hardware complication.  Right shoulder: Moderate to severe glenohumeral degenerative changes with joint space loss.  No acute fracture or dislocation.    IMPRESSION:  As above.    Finalized on: 6/26/2024 12:12 PM By:  Danial Bar MD  [facility]# [id number]      2024-06-26 12:14:38.848    AARTI       I spent a total of 55 minutes on the day of the visit. This includes face to face time in discussion of goals of care, symptom assessment, coordination of care and emotional support.  This also includes non-face to face time preparing to see the patient (eg, review of tests/imaging), obtaining and/or reviewing separately obtained history, documenting clinical information in the electronic or other health record, independently interpreting results and communicating results to the patient/family/caregiver, or care coordinator.     Additional 10 min time spent on a voluntary advance care planning and /or goals of care discussion, providing emotional support, formulating and communicating prognosis and exploring burden/benefit of various approaches of treatment.       Heather Miller NP

## 2025-02-07 NOTE — TELEPHONE ENCOUNTER
Called patient, patient did not answer. Left detailed voicemail with contact number to call back.     ----- Message from Kerry sent at 2/7/2025  1:05 PM CST -----  [phone]   Pt is calling in reference to  glasses Rx .She states [employer] told her that her prescription needs to state that the glasses are for reading and it does not.

## 2025-02-07 NOTE — ASSESSMENT & PLAN NOTE
Sx controlled today, renal function has stabilized.   Followed by cardiology and PCP  Desires limited interventon with focus on function and QOL  Continue POC

## 2025-02-09 ENCOUNTER — PATIENT MESSAGE (OUTPATIENT)
Dept: OPHTHALMOLOGY | Facility: CLINIC | Age: OVER 89
End: 2025-02-09
Payer: MEDICARE

## 2025-02-10 ENCOUNTER — OFFICE VISIT (OUTPATIENT)
Dept: PALLIATIVE MEDICINE | Facility: CLINIC | Age: OVER 89
End: 2025-02-10
Payer: MEDICARE

## 2025-02-10 VITALS
HEART RATE: 114 BPM | OXYGEN SATURATION: 97 % | SYSTOLIC BLOOD PRESSURE: 110 MMHG | BODY MASS INDEX: 30.98 KG/M2 | WEIGHT: 181.44 LBS | DIASTOLIC BLOOD PRESSURE: 73 MMHG | TEMPERATURE: 98 F | HEIGHT: 64 IN

## 2025-02-10 DIAGNOSIS — I48.91 ATRIAL FIBRILLATION WITH RVR: Chronic | ICD-10-CM

## 2025-02-10 DIAGNOSIS — I13.2 HYPERTENSIVE HEART AND RENAL DISEASE WITH BOTH (CONGESTIVE) HEART FAILURE AND RENAL FAILURE: Primary | Chronic | ICD-10-CM

## 2025-02-10 DIAGNOSIS — N19 HYPERTENSIVE HEART AND RENAL DISEASE WITH BOTH (CONGESTIVE) HEART FAILURE AND RENAL FAILURE: Primary | Chronic | ICD-10-CM

## 2025-02-10 DIAGNOSIS — Z51.5 ENCOUNTER FOR PALLIATIVE CARE: ICD-10-CM

## 2025-02-10 PROCEDURE — 1160F RVW MEDS BY RX/DR IN RCRD: CPT | Mod: HCNC,CPTII,S$GLB, | Performed by: NURSE PRACTITIONER

## 2025-02-10 PROCEDURE — 1123F ACP DISCUSS/DSCN MKR DOCD: CPT | Mod: HCNC,CPTII,S$GLB, | Performed by: NURSE PRACTITIONER

## 2025-02-10 PROCEDURE — 1125F AMNT PAIN NOTED PAIN PRSNT: CPT | Mod: HCNC,CPTII,S$GLB, | Performed by: NURSE PRACTITIONER

## 2025-02-10 PROCEDURE — 99999 PR PBB SHADOW E&M-EST. PATIENT-LVL IV: CPT | Mod: PBBFAC,HCNC,, | Performed by: NURSE PRACTITIONER

## 2025-02-10 PROCEDURE — 99215 OFFICE O/P EST HI 40 MIN: CPT | Mod: HCNC,S$GLB,, | Performed by: NURSE PRACTITIONER

## 2025-02-10 PROCEDURE — 1159F MED LIST DOCD IN RCRD: CPT | Mod: HCNC,CPTII,S$GLB, | Performed by: NURSE PRACTITIONER

## 2025-02-10 NOTE — Clinical Note
Hi - I saw Ms Flower for palliative appt today. Heart rate is consistently around 115 despite taking metoprolol 100 mg BID. BP looks fine, SBP usually in the low 100s. She is using midodrine daily. She has needed to take an extra dose of weekly metolazone the last couple of weeks. She's having increased fatigue and generalized weakness also the past couple of weeks. I didn't see any overt sx of fluid excess today. Her exam was at baseline except for the rapid HR.  She declined labs.  Any recommendations regarding rate control? Other recommendations?  Thanks, Heather  Expected Date Of Service: 12/01/2022 Billing Type: Third-Party Bill Performing Laboratory: 0 Bill For Surgical Tray: no

## 2025-02-10 NOTE — ASSESSMENT & PLAN NOTE
No sx of fluid overload today, has required metolazone twice weekly the past couple of weeks.   Persistently elevated HR the past couple of weeks with increased fatigue.  Followed by cardiology and PCP  Desires limited interventon with focus on function and QOL   Continue POC.  Will message cardiology regarding persistently elevated HR

## 2025-02-10 NOTE — ASSESSMENT & PLAN NOTE
Followed by cardiology and PCP.   HR remains around 115 bpm despite metoprolol 100 mg dosing.  Also taking midodrine with metoprolol.   Will message cardiology  Now experiencing some increased edema that seems responsive to metolazone.

## 2025-02-10 NOTE — ASSESSMENT & PLAN NOTE
Goals of care: She would like to remain a full code presently but if resuscitation necessary she would not want to be on ventilator more than a few days. Advanced directive: Would not want any prolonged ventilator support. Would not want feeding tube placed for conditions from which she is not expected to recover. She has made her family aware of her wishes. Declined completion of living will and [place]POST  HC POA: Son Boris and daughter-in-law; they have copy of HC POA and know her HC wishes. Encouraged to bring a copy for records at [facility]  Symptoms: generalized weakness  Follow up: 2 months, sooner if needed.

## 2025-02-13 ENCOUNTER — PATIENT MESSAGE (OUTPATIENT)
Dept: PALLIATIVE MEDICINE | Facility: CLINIC | Age: OVER 89
End: 2025-02-13
Payer: MEDICARE

## 2025-02-13 ENCOUNTER — PATIENT MESSAGE (OUTPATIENT)
Dept: CARDIOLOGY | Facility: CLINIC | Age: OVER 89
End: 2025-02-13
Payer: MEDICARE

## 2025-02-13 DIAGNOSIS — I48.0 PAF (PAROXYSMAL ATRIAL FIBRILLATION): Chronic | ICD-10-CM

## 2025-02-17 RX ORDER — METOPROLOL TARTRATE 25 MG/1
100 TABLET, FILM COATED ORAL 2 TIMES DAILY
Qty: 720 TABLET | Refills: 0 | Status: SHIPPED | OUTPATIENT
Start: 2025-02-17 | End: 2025-05-18

## 2025-02-19 ENCOUNTER — OFFICE VISIT (OUTPATIENT)
Dept: CARDIOLOGY | Facility: CLINIC | Age: OVER 89
End: 2025-02-19
Payer: MEDICARE

## 2025-02-19 VITALS
WEIGHT: 183.19 LBS | HEIGHT: 64 IN | DIASTOLIC BLOOD PRESSURE: 80 MMHG | SYSTOLIC BLOOD PRESSURE: 102 MMHG | OXYGEN SATURATION: 96 % | HEART RATE: 111 BPM | BODY MASS INDEX: 31.27 KG/M2

## 2025-02-19 DIAGNOSIS — I12.9 CKD STAGE 4 SECONDARY TO HYPERTENSION: Chronic | ICD-10-CM

## 2025-02-19 DIAGNOSIS — N18.4 CKD STAGE 4 SECONDARY TO HYPERTENSION: Chronic | ICD-10-CM

## 2025-02-19 DIAGNOSIS — N18.4 TYPE 2 DIABETES MELLITUS WITH STAGE 4 CHRONIC KIDNEY DISEASE, WITHOUT LONG-TERM CURRENT USE OF INSULIN: Chronic | ICD-10-CM

## 2025-02-19 DIAGNOSIS — I48.19 PERSISTENT ATRIAL FIBRILLATION: Chronic | ICD-10-CM

## 2025-02-19 DIAGNOSIS — I11.9 HYPERTENSIVE LEFT VENTRICULAR HYPERTROPHY, WITHOUT HEART FAILURE: Chronic | ICD-10-CM

## 2025-02-19 DIAGNOSIS — Z79.01 CHRONIC ANTICOAGULATION: ICD-10-CM

## 2025-02-19 DIAGNOSIS — I48.92 ATRIAL FLUTTER WITH RAPID VENTRICULAR RESPONSE: Primary | ICD-10-CM

## 2025-02-19 DIAGNOSIS — N19 HYPERTENSIVE HEART AND RENAL DISEASE WITH BOTH (CONGESTIVE) HEART FAILURE AND RENAL FAILURE: Chronic | ICD-10-CM

## 2025-02-19 DIAGNOSIS — E11.22 TYPE 2 DIABETES MELLITUS WITH STAGE 4 CHRONIC KIDNEY DISEASE, WITHOUT LONG-TERM CURRENT USE OF INSULIN: Chronic | ICD-10-CM

## 2025-02-19 DIAGNOSIS — I13.2 HYPERTENSIVE HEART AND RENAL DISEASE WITH BOTH (CONGESTIVE) HEART FAILURE AND RENAL FAILURE: Chronic | ICD-10-CM

## 2025-02-19 NOTE — PATIENT INSTRUCTIONS
Metoprolol 75 mg in AM and PM  Lunch take 50 mg tablets    Can take midodrine 5 mg as needed for low blood pressure    Take lasix 40 mg in AM and around 1 pm.

## 2025-02-19 NOTE — PROGRESS NOTES
Subjective:   Patient ID:  Shirley Metz is a 89 y.o. female who presents for evaluation of Follow-up      HPI    Shirley Metz is a 89 year old female who presents to clinic for preop risk stratification.     Her current medical conditions include PAF on Eliquis, HFpEF, Anxiety, HTN, CKD Stage IV, obesity, DM Type II.     Metoprolol increased to 75 mg BID for the past 24 hours.     Not using metolazone regularly.   Using Tylenol, no NSAIDS regularly.     She does endorse recent issues with shortness of breath and fluid retention since increased heart rates. One of her eye drops were stopped to assist with improvement in HR control in an effort to approve her for surgery in February.     Using walker for fall prevention.     Denies chest pain or anginal equivalents. No shortness of breath, RUIZ or palpitations. Denies orthopnea, PND or abdominal bloating. Reports regular walking without any issues lately. NO leg swelling or claudications. No recent falls, syncope or near syncopal events. Reports compliance with medications and dietary restrictions. NO CNS complaints to suggest TIA or CVA today. No signs of abnormal bleeding on Eliquis.     3/22/2024 update    Shirley Metz returns for follow up.    She has had both eye surgeries.     Reviewed home log today-episodes of HR 80-90s at home, some HR in 120s at times but feels much better.     BP well controlled today in office and on home log.     Denies any chest pain or anginal equivalents. Denies any palpitations.   No signs of abnormal bleeding on eliquis.   Taking metoprolol 50 mg BID  Still not driving.     Shortness of breath stable today.     Intermittent ankle edema.  Doing well with lasix BID.  Used Zaroxolyn only once.       Using rollator. No syncope or near syncopal events. Denies any recent falls.     Has multiple complaints to neck and shoulders today, may benefit from physical therapy.   Has worsening itching to scalp and  forehead as well today.     2/19/2025 update    Shirley Metz returns for follow up.    Has been having worsening hypotension. When she takes 4 doses of metoprolol she has more issues with constipation and dry eyes.     She is taking metolazone once a week. Has residual leg swelling in AM which is not completely gone as previously.     Can take 2 tabs of midodrine for low BP episodes.     Using rollator for fall prevention. Denies any recent falls.   Does notice that she is weaker than previously.   Taking eliquis for cardio-embolic protection.       Past Medical History:   Diagnosis Date    Acute on chronic diastolic congestive heart failure 01/11/2023    Anemia 11/29/2018    Anxiety 11/28/2017    Arthritis     Atrial fibrillation with RVR 10/09/2019    Back pain     Basal cell carcinoma 03/29/2018    left mid back    Chronic diastolic congestive heart failure 10/15/2019    CKD (chronic kidney disease) stage 3, GFR 30-59 ml/min 08/08/2016    Colon polyps     reported per patient.     Coronary artery calcification 10/05/2021    Elevated liver enzymes 12/20/2019    Elevated troponin 03/15/2018    Fuchs' corneal dystrophy     General anesthetics causing adverse effect in therapeutic use     woke up during surgery and gagged on ET tube    Glaucoma     Glaucoma     Heart failure with preserved left ventricular function (HFpEF) 07/24/2018    Hyperlipidemia     Hypertension     Macular degeneration dry    Medication side effects 05/03/2017    Obesity     Pre-diabetes 12/04/2017    PVD (peripheral vascular disease) 04/26/2021    PVD (peripheral vascular disease) 04/26/2021    Squamous cell carcinoma 01/08/2019    left shoulder    Stenosis of carotid artery 10/05/2021    Troponin level elevated 01/11/2023    Trouble in sleeping     Type 2 diabetes mellitus without complication, without long-term current use of insulin 02/24/2021    Urinary tract infection        Past Surgical History:   Procedure Laterality Date     ABLATION OF ARRHYTHMOGENIC FOCUS FOR ATRIAL FIBRILLATION N/A 11/03/2022    Procedure: Ablation atrial fibrillation;  Surgeon: Toi Kelly MD;  Location: [facility] EP LAB;  Service: Cardiology;  Laterality: N/A;  afib, PVI/CTI, RFA, STACEY (cx if SR), DEDE, anes, MB, 3 Prep    ADENOIDECTOMY  1938    BREAST BIOPSY Left     1997. benign    BREAST CYST EXCISION Left 1997 approx    CARPAL TUNNEL RELEASE Right 2012 approx    CATARACT EXTRACTION Bilateral 1994    CHOLECYSTECTOMY  1975    CORNEAL TRANSPLANT Bilateral 08/2015 8/2015 - left; Right 4/2016    EYE SURGERY  12/06/2023    Fuch's Corneal dystrophy - had 2nd operation with Dr. Quintanilla    EYE SURGERY      Cataract wIOL    left thumb Left 2011    removed cuboid for arthritis    REVERSE TOTAL SHOULDER ARTHROPLASTY Left 08/21/2018    Procedure: ARTHROPLASTY, SHOULDER, TOTAL, REVERSE;  Surgeon: Solomon Lujan MD;  Location: [facility], [place] OR;  Service: Orthopedics;  Laterality: Left;  WITH BICEP TENODESIS    SKIN CANCER EXCISION Left 2017    BCC removal by Dr. Valadez    SLT- OS (aka TRABECULOPLASTY) Left 05/11/2011    repeat 3/14/12    TENOTOMY Left 08/21/2018    Procedure: TENOTOMY;  Surgeon: Solomon Lujan MD;  Location: [facility], [place] OR;  Service: Orthopedics;  Laterality: Left;    TONSILLECTOMY  1938    TRANSESOPHAGEAL ECHOCARDIOGRAM WITH POSSIBLE CARDIOVERSION (STACEY W/ POSS CARDIOVERSION) N/A 03/21/2023    Procedure: Transesophageal echo (STACEY) intra-procedure log documentation;  Surgeon: Trevon Ramirez MD;  Location: [facility], [place] CATH LAB;  Service: Cardiology;  Laterality: N/A;    TREATMENT OF CARDIAC ARRHYTHMIA N/A 10/10/2019    Procedure: CARDIOVERSION;  Surgeon: Pete Durán MD;  Location: [facility], [place] CATH LAB;  Service: Cardiology;  Laterality: N/A;    TREATMENT OF CARDIAC ARRHYTHMIA N/A 12/06/2019    Procedure: CARDIOVERSION;  Surgeon: Samy Castillo MD;  Location: [facility], [place] CATH LAB;  Service: Cardiology;  Laterality: N/A;       Social History     Tobacco Use    Smoking status: Never     Smokeless tobacco: Never    Tobacco comments:     history of passive smoking from her ex-   Substance Use Topics    Alcohol use: Yes     Alcohol/week: 2.0 standard drinks of alcohol     Types: 2 Standard drinks or equivalent per week     Comment: occ    Drug use: No       Family History   Problem Relation Name Age of Onset    Heart disease Mother      Hypertension Mother      Cancer Father  88        sarcoma( ?[place]'s ) on leg    Deep vein thrombosis Neg Hx      Ovarian cancer Neg Hx      Breast cancer Neg Hx      Kidney disease Neg Hx      Strabismus Neg Hx      Retinal detachment Neg Hx      Macular degeneration Neg Hx      Glaucoma Neg Hx      Blindness Neg Hx      Amblyopia Neg Hx      Eczema Neg Hx      Lupus Neg Hx      Melanoma Neg Hx      Psoriasis Neg Hx      Diabetes Neg Hx      Stroke Neg Hx      Intellectual disability Neg Hx      Mental illness Neg Hx      Hyperlipidemia Neg Hx      COPD Neg Hx      Asthma Neg Hx      Depression Neg Hx      Alcohol abuse Neg Hx      Drug abuse Neg Hx         Wt Readings from Last 3 Encounters:   02/19/25 83.1 kg (183 lb 3.2 oz)   02/10/25 82.3 kg (181 lb 7 oz)   01/27/25 82.3 kg (181 lb 7 oz)     Temp Readings from Last 3 Encounters:   02/10/25 97.6 °F (36.4 °C)   01/03/25 96.9 °F (36.1 °C) (Temporal)   12/03/24 97.3 °F (36.3 °C)     BP Readings from Last 3 Encounters:   02/19/25 102/80   02/10/25 110/73   01/27/25 124/78     Pulse Readings from Last 3 Encounters:   02/19/25 (!) 111   02/10/25 (!) 114   01/27/25 (!) 112       Current Outpatient Medications on File Prior to Visit   Medication Sig Dispense Refill    acetaminophen (TYLENOL) 500 MG tablet Take 500 mg by mouth nightly as needed.      ALPRAZolam (XANAX) 0.5 MG tablet Take 0.5 mg by mouth 3 (three) times daily.      ALPRAZolam (XANAX) 0.5 MG tablet TAKE 1 TABLET (0.5 MG TOTAL) BY MOUTH NIGHTLY AS NEEDED FOR ANXIETY. 30 tablet 0    b complex vitamins capsule Take 1 capsule by mouth once daily.       calcium carbonate/vitamin D3 (VITAMIN D-3 ORAL) Take 4,000 Int'l Units/day by mouth once daily.      ELIQUIS 2.5 mg Tab TAKE 1 TABLET (2.5 MG TOTAL) BY MOUTH 2 (TWO) TIMES DAILY. 180 tablet 1    furosemide (LASIX) 40 MG tablet Take 1 tab in am and 1/2 tab in evening 90 tablet 1    latanoprostene bunod (VYZULTA) 0.024 % Drop Place 1 drop into both eyes every evening. 2.5 mL 12    metOLazone (ZAROXOLYN) 2.5 MG tablet Take 1 tablet (2.5 mg total) by mouth as needed (aprx once weekly). 12 tablet 3    metoprolol tartrate (LOPRESSOR) 25 MG tablet Take 4 tablets (100 mg total) by mouth 2 (two) times daily. 720 tablet 0    midodrine (PROAMATINE) 2.5 MG Tab Take 1 tablet (2.5 mg total) by mouth 3 (three) times daily as needed (low BP). 90 tablet 11    [DISCONTINUED] cloNIDine (CATAPRES) 0.1 MG tablet Take 1 tablet (0.1 mg total) by mouth 2 (two) times daily as needed (systolic BP over 180). 20 tablet 1    [DISCONTINUED] isosorbide mononitrate (IMDUR) 30 MG 24 hr tablet Take 1 tablet (30 mg total) by mouth every evening. 90 tablet 1     No current facility-administered medications on file prior to visit.       No cardiac monitor results found for the past 12 months    Results for orders placed during the hospital encounter of 07/11/22    Echo    Interpretation Summary  · Concentric remodeling and normal systolic function.  · The estimated ejection fraction is 60%.  · Normal left ventricular diastolic function.  · Normal right ventricular size with normal right ventricular systolic function.    No results found for this or any previous visit.        Results for orders placed or performed during the hospital encounter of 11/04/24   SCHEDULED EKG 12-LEAD (to Muse)    Collection Time: 11/04/24  9:46 AM   Result Value Ref Range    QRS Duration 74 ms    [facility] QTC Calculation 474 ms    Narrative    Test Reason : I10,I13.2,N19,    Vent. Rate : 108 BPM     Atrial Rate : 108 BPM     P-R Int : 118 ms          QRS Dur : 074 ms      QT Int  ": 354 ms       P-R-T Axes : 087 091 084 degrees     QTc Int : 474 ms    Atrial flutter  Rightward axis  Nonspecific ST abnormality  Abnormal ECG    Confirmed by Maribel Oneal MD ([id number]) on 11/5/2024 11:02:17 AM    Referred By: RADHA CORREIA           Confirmed By:Maribel Oneal MD         Review of Systems   Constitutional: Positive for malaise/fatigue.   HENT:  Negative for hearing loss and hoarse voice.    Eyes:  Negative for blurred vision and visual disturbance.   Cardiovascular:  Positive for dyspnea on exertion. Negative for chest pain, claudication, irregular heartbeat, leg swelling, near-syncope, orthopnea, palpitations, paroxysmal nocturnal dyspnea and syncope.   Respiratory:  Positive for shortness of breath. Negative for cough, hemoptysis, sleep disturbances due to breathing, snoring and wheezing.    Endocrine: Negative for cold intolerance and heat intolerance.   Hematologic/Lymphatic: Bruises/bleeds easily (on eliquis).   Skin:  Negative for color change, dry skin and nail changes.   Musculoskeletal:  Positive for arthritis and back pain. Negative for joint pain and myalgias.   Gastrointestinal:  Negative for bloating, abdominal pain, constipation, nausea and vomiting.   Genitourinary:  Negative for dysuria, flank pain, hematuria and hesitancy.   Neurological:  Positive for weakness. Negative for headaches, light-headedness, loss of balance, numbness and paresthesias.   Psychiatric/Behavioral:  Negative for altered mental status.    Allergic/Immunologic: Negative for environmental allergies.         Objective:/80 (BP Location: Left arm, Patient Position: Sitting)   Pulse (!) 111   Ht 5' 4" (1.626 m)   Wt 83.1 kg (183 lb 3.2 oz)   LMP  (LMP Unknown)   SpO2 96%   BMI 31.45 kg/m²      Physical Exam  Vitals and nursing note reviewed.   Constitutional:       General: She is not in acute distress.     Appearance: Normal appearance. She is well-developed. She is obese. She is not " ill-appearing.   HENT:      Head: Normocephalic and atraumatic.      Nose: Nose normal.      Mouth/Throat:      Mouth: Mucous membranes are moist.   Eyes:      Pupils: Pupils are equal, round, and reactive to light.   Neck:      Thyroid: No thyromegaly.      Vascular: No JVD.      Trachea: No tracheal deviation.   Cardiovascular:      Rate and Rhythm: Tachycardia present. Rhythm irregularly irregular.      Chest Wall: PMI is not displaced.      Pulses: Intact distal pulses.           Radial pulses are 2+ on the right side and 2+ on the left side.        Dorsalis pedis pulses are 2+ on the right side and 2+ on the left side.      Heart sounds: S1 normal and S2 normal. Heart sounds not distant. No murmur heard.  Pulmonary:      Effort: Pulmonary effort is normal. No respiratory distress.      Breath sounds: Normal breath sounds. No wheezing.   Abdominal:      General: Bowel sounds are normal. There is no distension.      Palpations: Abdomen is soft.      Tenderness: There is no abdominal tenderness.   Musculoskeletal:         General: No swelling. Normal range of motion.      Cervical back: Full passive range of motion without pain, normal range of motion and neck supple.      Right lower leg: No edema.      Left lower leg: No edema.      Right ankle: Swelling present.      Left ankle: Swelling present.   Skin:     General: Skin is warm and dry.      Capillary Refill: Capillary refill takes less than 2 seconds.      Nails: There is no clubbing.   Neurological:      General: No focal deficit present.      Mental Status: She is alert and oriented to person, place, and time.   Psychiatric:         Speech: Speech normal.         Behavior: Behavior normal.         Thought Content: Thought content normal.         Judgment: Judgment normal.         Lab Results   Component Value Date    CHOL 170 08/04/2023    CHOL 168 08/31/2022    CHOL 232 (H) 07/19/2021     Lab Results   Component Value Date    HDL 40 08/04/2023    HDL 43  08/31/2022    HDL 48 07/19/2021     Lab Results   Component Value Date    LDLCALC 106.8 08/04/2023    LDLCALC 105.4 08/31/2022    LDLCALC 155.8 07/19/2021     Lab Results   Component Value Date    TRIG 116 08/04/2023    TRIG 98 08/31/2022    TRIG 141 07/19/2021     Lab Results   Component Value Date    CHOLHDL 23.5 08/04/2023    CHOLHDL 25.6 08/31/2022    CHOLHDL 20.7 07/19/2021       Chemistry        Component Value Date/Time     09/23/2024 1051    K 3.9 09/23/2024 1051     09/23/2024 1051    CO2 29 09/23/2024 1051    BUN 44 (H) 09/23/2024 1051    CREATININE 2.0 (H) 09/23/2024 1051     (H) 09/23/2024 1051        Component Value Date/Time    CALCIUM 9.4 09/23/2024 1051    ALKPHOS 70 09/23/2024 1051    AST 19 09/23/2024 1051    ALT 13 09/23/2024 1051    BILITOT 0.6 09/23/2024 1051    ESTGFRAFRICA 30 (A) 07/26/2022 1348    EGFRNONAA 26 (A) 07/26/2022 1348          Lab Results   Component Value Date    TSH 3.577 04/27/2023     Lab Results   Component Value Date    INR 1.1 10/25/2022    INR 1.0 02/12/2020    INR 1.0 01/25/2018     Lab Results   Component Value Date    WBC 7.07 09/23/2024    HGB 13.1 09/23/2024    HCT 39.9 09/23/2024     (H) 09/23/2024     09/23/2024          Assessment:      1. Atrial flutter with rapid ventricular response    2. Persistent atrial fibrillation    3. Hypertensive left ventricular hypertrophy, without heart failure    4. 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    6. Chronic anticoagulation    7. Type 2 diabetes mellitus with stage 4 chronic kidney disease, without long-term current use of insulin            Plan:     Try to adjust metoprolol to 75 mg in AM and PM and 50 mg around lunch.     Continue eliquis for cardio-embolic protection  Can take lasix 40 mg in AM and 40 mg around 1 pm.   Elevated legs as much as possible throughout the day.     Send portal  message in 1 week to update me on progress  Concern that metoprolol is losing its efficacy.       Nicole May, FNP-C [facility]

## 2025-02-21 ENCOUNTER — OFFICE VISIT (OUTPATIENT)
Dept: PRIMARY CARE CLINIC | Facility: CLINIC | Age: OVER 89
End: 2025-02-21
Payer: MEDICARE

## 2025-02-21 VITALS
BODY MASS INDEX: 31.45 KG/M2 | HEIGHT: 64 IN | OXYGEN SATURATION: 94 % | HEART RATE: 115 BPM | TEMPERATURE: 97 F | SYSTOLIC BLOOD PRESSURE: 122 MMHG | WEIGHT: 184.19 LBS | DIASTOLIC BLOOD PRESSURE: 70 MMHG

## 2025-02-21 DIAGNOSIS — N18.4 TYPE 2 DIABETES MELLITUS WITH STAGE 4 CHRONIC KIDNEY DISEASE, WITHOUT LONG-TERM CURRENT USE OF INSULIN: Chronic | ICD-10-CM

## 2025-02-21 DIAGNOSIS — N19 HYPERTENSIVE HEART AND RENAL DISEASE WITH BOTH (CONGESTIVE) HEART FAILURE AND RENAL FAILURE: Chronic | ICD-10-CM

## 2025-02-21 DIAGNOSIS — E11.22 TYPE 2 DIABETES MELLITUS WITH STAGE 4 CHRONIC KIDNEY DISEASE, WITHOUT LONG-TERM CURRENT USE OF INSULIN: Chronic | ICD-10-CM

## 2025-02-21 DIAGNOSIS — Z51.5 PALLIATIVE CARE STATUS: ICD-10-CM

## 2025-02-21 DIAGNOSIS — E21.3 HYPERPARATHYROIDISM: ICD-10-CM

## 2025-02-21 DIAGNOSIS — I12.9 CKD STAGE 4 SECONDARY TO HYPERTENSION: Chronic | ICD-10-CM

## 2025-02-21 DIAGNOSIS — E66.811 OBESITY (BMI 30.0-34.9): ICD-10-CM

## 2025-02-21 DIAGNOSIS — I70.0 CALCIFICATION OF AORTA: Chronic | ICD-10-CM

## 2025-02-21 DIAGNOSIS — I13.2 HYPERTENSIVE HEART AND RENAL DISEASE WITH BOTH (CONGESTIVE) HEART FAILURE AND RENAL FAILURE: Chronic | ICD-10-CM

## 2025-02-21 DIAGNOSIS — Z00.00 ENCOUNTER FOR PREVENTIVE HEALTH EXAMINATION: Primary | ICD-10-CM

## 2025-02-21 DIAGNOSIS — G62.9 NEUROPATHY: ICD-10-CM

## 2025-02-21 DIAGNOSIS — R54 FRAILTY SYNDROME IN GERIATRIC PATIENT: Chronic | ICD-10-CM

## 2025-02-21 DIAGNOSIS — I48.19 PERSISTENT ATRIAL FIBRILLATION: Chronic | ICD-10-CM

## 2025-02-21 DIAGNOSIS — M15.0 PRIMARY OSTEOARTHRITIS INVOLVING MULTIPLE JOINTS: Chronic | ICD-10-CM

## 2025-02-21 DIAGNOSIS — M85.852 OSTEOPENIA OF LEFT HIP: ICD-10-CM

## 2025-02-21 DIAGNOSIS — N18.4 CKD STAGE 4 SECONDARY TO HYPERTENSION: Chronic | ICD-10-CM

## 2025-02-21 DIAGNOSIS — G47.00 INSOMNIA, UNSPECIFIED TYPE: ICD-10-CM

## 2025-02-21 DIAGNOSIS — D68.9 COAGULOPATHY: Chronic | ICD-10-CM

## 2025-02-21 PROCEDURE — 99999 PR PBB SHADOW E&M-EST. PATIENT-LVL V: CPT | Mod: PBBFAC,HCNC,,

## 2025-02-21 NOTE — ASSESSMENT & PLAN NOTE
Closely followed by cardiology Dr. Kelly--pt states 110 heart rate is where cardiology would like heart rate to remain  Continue metoprolol

## 2025-02-21 NOTE — PATIENT INSTRUCTIONS
Counseling and Referral of Other Preventative  (Italic type indicates deductible and co-insurance are waived)    Patient Name: Shirley Metz  Today's Date: 2/21/2025    Health Maintenance       Date Due Completion Date    Shingles Vaccine (1 of 2) Never done ---    TETANUS VACCINE 11/09/2021 11/9/2011    Lipid Panel 08/04/2024 8/4/2023    COVID-19 Vaccine (6 - 2024-25 season) 09/13/2024 7/19/2024    Hemoglobin A1c 03/23/2025 9/23/2024    Diabetic Eye Exam 08/05/2025 8/5/2024 (Done)    Override on 8/5/2024: Done (Dr Prescott)    Diabetes Urine Screening 10/16/2025 10/16/2024        Orders Placed This Encounter   Procedures    DXA Bone Density Axial Skeleton 1 or more sites     The following information is provided to all patients.  This information is to help you find resources for any of the problems found today that may be affecting your health:                  Living healthy guide: [email or web address].[email or web address]      Understanding Diabetes: www.diabetes.org      Eating healthy: www.cdc.gov/healthyweight      [facility] home safety checklist: www.cdc.gov/steadi/patient.html      Agency on Aging: www.goea.[place].gov      Alcoholics anonymous (AA): www.aa.org      Physical Activity: www.andrew.nih.gov/na8ithy      Tobacco use: www.quitwithusla.org

## 2025-02-21 NOTE — ASSESSMENT & PLAN NOTE
Chest x-ray results 08/04/2023  Aorta demonstrates atherosclerotic disease.   Continue OTC fish oil, eliquis, metoprolol   Monitor BP, blood glucose and cholesterol  Maintain a low fat diet, low sodium diet  Recommend exercise at least 30 minutes daily.

## 2025-02-21 NOTE — ASSESSMENT & PLAN NOTE
Tolerating Eliquis well without sx of bleeding  Continue to monitor labs  Component      Latest Ref Rng 7/28/2023 5/20/2024 9/23/2024   Platelet Count      150 - 450 K/uL 190  211  188

## 2025-02-21 NOTE — PROGRESS NOTES
"  Shirley Metz presented for a follow-up Medicare AWV today. The following components were reviewed and updated:    Medical history  Family History  Social history  Allergies and Current Medications  Health Risk Assessment  Health Maintenance  Care Team    **See Completed Assessments for Annual Wellness visit with in the encounter summary    The following assessments were completed:  Depression Screening  Cognitive function Screening  Timed Get Up Test  Whisper Test  Nutrition Screening      Time asked: 10 minutes after 11 o'clock (11:10)    Opioid documentation for eAWV      Patient does not have a current opioid prescription.         Vitals:    02/21/25 1249   BP: 122/70   BP Location: Right arm   Patient Position: Sitting   Pulse: (!) 115   Temp: 96.7 °F (35.9 °C)   TempSrc: Temporal   SpO2: (!) 94%   Weight: 83.6 kg (184 lb 3.1 oz)   Height: 5' 4" (1.626 m)     Body mass index is 31.62 kg/m².                Physical Exam  Constitutional:       Appearance: Normal appearance. She is normal weight.   HENT:      Head: Normocephalic.      Right Ear: Tympanic membrane, ear canal and external ear normal.      Left Ear: Tympanic membrane, ear canal and external ear normal.      Nose: Nose normal.      Mouth/Throat:      Mouth: Mucous membranes are moist.      Pharynx: Oropharynx is clear.   Eyes:      Extraocular Movements: Extraocular movements intact.      Conjunctiva/sclera: Conjunctivae normal.      Pupils: Pupils are equal, round, and reactive to light.   Cardiovascular:      Rate and Rhythm: Tachycardia present. Rhythm regularly irregular.      Pulses: Normal pulses.           Dorsalis pedis pulses are 2+ on the right side and 2+ on the left side.      Heart sounds: Normal heart sounds.   Pulmonary:      Effort: Pulmonary effort is normal.      Breath sounds: Normal breath sounds.   Abdominal:      General: Bowel sounds are normal.      Palpations: Abdomen is soft.   Musculoskeletal:         General: Normal " range of motion.      Cervical back: Normal range of motion and neck supple.      Right lower le+ Pitting Edema (feet) present.      Left lower le+ Pitting Edema (feet) present.   Feet:      Right foot:      Skin integrity: Skin integrity normal.      Toenail Condition: Right toenails are normal.      Left foot:      Skin integrity: Skin integrity normal.      Toenail Condition: Left toenails are normal.   Skin:     General: Skin is warm and dry.      Capillary Refill: Capillary refill takes less than 2 seconds.   Neurological:      General: No focal deficit present.      Mental Status: She is alert and oriented to person, place, and time. Mental status is at baseline.   Psychiatric:         Mood and Affect: Mood normal.         Behavior: Behavior normal.         Thought Content: Thought content normal.         Judgment: Judgment normal.               Health Maintenance         Date Due Completion Date    Shingles Vaccine (1 of 2) Never done ---    TETANUS VACCINE 2021    Lipid Panel 2024    COVID-19 Vaccine ( season) 2024    Hemoglobin A1c 2025    Diabetic Eye Exam 2025 (Done)    Override on 2024: Done (Dr Prescott)    Diabetes Urine Screening 10/16/2025 10/16/2024              PROBLEM LIST ITEMS ADDRESSED THIS VISIT:    1. Encounter for preventive health examination  Assessment & Plan:  Up-To-Date w/ recommended age appropirate screening   Up-To-Date w/ recommended vaccines   Review for Opioid Screening: Pt does not have Rx for Opioids    Review for Substance Use Disorders: Patient does not use illicit substances as reported        2. 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 Calcification of  aorta  Overview:  6/25/11: CT chest: THORACIC AORTA IS CALCIFIED WITHOUT ANEURYSMAL DILATATION.      Assessment & Plan:  Chest x-ray results 08/04/2023  Aorta demonstrates atherosclerotic disease.   Continue OTC fish oil, eliquis, metoprolol   Monitor BP, blood glucose and cholesterol  Maintain a low fat diet, low sodium diet  Recommend exercise at least 30 minutes daily.            4. Persistent atrial fibrillation  Assessment & Plan:  Closely followed by cardiology Dr. Kelly--pt states 110 heart rate is where cardiology would like heart rate to remain  Continue metoprolol       5. 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  Assessment & Plan:  Lab Results   Component Value Date    EGFRNORACEVR 23.2 (A) 09/23/2024    EGFRNORACEVR 23.2 (A) 05/20/2024    EGFRNORACEVR 26.3 (A) 01/08/2024    CREATININE 2.0 (H) 09/23/2024    CREATININE 2.0 (H) 05/20/2024    CREATININE 1.8 (H) 01/08/2024    BUN 44 (H) 09/23/2024    BUN 37 (H) 05/20/2024    BUN 38 (H) 01/08/2024    ALBUMIN 3.7 09/23/2024    ALBUMIN 4.1 05/20/2024    ALBUMIN 3.6 01/08/2024   (    Managing blood pressure   Eating foods that are good for the heart and low in sodium   Exercising regularly as tolerated  Getting enough sleep   Managing blood sugar if you have diabetes   Avoiding medications that can further damage the kidneys such as NSAIDs (ibuprofen, naproxen)       6. Coagulopathy  Assessment & Plan:  Tolerating Eliquis well without sx of bleeding  Continue to monitor labs  Component      Latest Ref Rng 7/28/2023 5/20/2024 9/23/2024   Platelet Count      150 - 450 K/uL 190  211  188            7. Osteopenia of left hip  Overview:  DEXA 8/7/2017  DEXA 7/31/15 H -0.9, N -1.2, S 1.5     Orders:  -     DXA Bone Density Axial Skeleton 1 or more sites; Future; Expected date: 02/21/2025    8. Frailty syndrome in geriatric patient  Assessment & Plan:  Working w/ Pallative care  At the point in life no longer desire certain treatments if feel not necessary  Desire to  have as much independence as possible  No recent falls  Unsteady gait      9. Type 2 diabetes mellitus with stage 4 chronic kidney disease, without long-term current use of insulin  Assessment & Plan:  Currently under Palliative care---refuses diabetes treatment    Hemoglobin A1C   Date Value Ref Range Status   09/23/2024 6.9 (H) 4.0 - 5.6 % Final     Comment:     ADA Screening Guidelines:  5.7-6.4%  Consistent with prediabetes  >or=6.5%  Consistent with diabetes    High levels of fetal hemoglobin interfere with the HbA1C  assay. Heterozygous hemoglobin variants (HbS, HgC, etc)do  not significantly interfere with this assay.   However, presence of multiple variants may affect accuracy.     05/20/2024 7.1 (H) 4.0 - 5.6 % Final     Comment:     ADA Screening Guidelines:  5.7-6.4%  Consistent with prediabetes  >or=6.5%  Consistent with diabetes    High levels of fetal hemoglobin interfere with the HbA1C  assay. Heterozygous hemoglobin variants (HbS, HgC, etc)do  not significantly interfere with this assay.   However, presence of multiple variants may affect accuracy.     09/06/2023 6.3 (H) 4.0 - 5.6 % Final     Comment:     ADA Screening Guidelines:  5.7-6.4%  Consistent with prediabetes  >or=6.5%  Consistent with diabetes    High levels of fetal hemoglobin interfere with the HbA1C  assay. Heterozygous hemoglobin variants (HbS, HgC, etc)do  not significantly interfere with this assay.   However, presence of multiple variants may affect accuracy.            10. Obesity (BMI 30.0-34.9)  Assessment & Plan:  Advise to increase exercise  Diet management w/ portion control         11. Hyperparathyroidism  Assessment & Plan:  Lab Results   Component Value Date    .2 (H) 04/27/2023    CALCIUM 9.4 09/23/2024    PHOS 4.0 01/08/2024     Closely monitor           Taking Vitamin d3      12. Primary osteoarthritis involving multiple joints  Assessment & Plan:  Continue topical antiinflammatory as needed for pain  Advise to  increase activity as tolerated       13. Insomnia, unspecified type  Assessment & Plan:  Taking low dose xanax  Have medication dependence w/ xanax.   Discuss risk of dependency of medication & try to find new ways to help w/ insomnia to cut back from xanax use.       14. Palliative care status  Assessment & Plan:  Continue f/u w/ Heather w/ Palliative care       15. Neuropathy  Overview:  12/16/10. EMG:    Moderately severe carpal Tunnel syndrome in the right hand.   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     Assessment & Plan:  Want to continue w/ conservative management.Continue to monitor                  Provided Shirley with a 5-10 year written screening schedule and personal prevention plan. Recommendations were developed using the USPSTF age appropriate recommendations. Education, counseling, and referrals were provided as needed.  After Visit Summary printed and given to patient which includes a list of additional screenings\tests needed.        Future Appointments   Date Time Provider Department Center   4/7/2025 10:30 AM Heather Millre NP [facility] [place]   5/23/2025  1:00 PM Phylicia Deleon MD [id number]   5/26/2025  1:00 PM Jo-Ann Jacobo PA-C [facility]   12/10/2025  9:45 AM Toi Kelly MD [facility]              JAIRON Madsen, FNP-C  [facility] [address] [place] [address]  [phone] ph  [phone] fax      I offered to discuss advanced care planning, including how to pick a person who would make decisions for you if you were unable to make them for yourself, called a health care power of , and what kind of decisions you might make such as use of life sustaining treatments such as ventilators and tube feeding when faced with a life limiting illness recorded on a living will that they will need to know. (How you want to be cared for as you near the end of your natural  life)     X Patient is interested in learning more about how to make advanced directives.  I provided them paperwork and offered to discuss this with them.

## 2025-02-21 NOTE — ASSESSMENT & PLAN NOTE
Taking low dose xanax  Have medication dependence w/ xanax.   Discuss risk of dependency of medication & try to find new ways to help w/ insomnia to cut back from xanax use.

## 2025-02-24 DIAGNOSIS — Z00.00 ENCOUNTER FOR MEDICARE ANNUAL WELLNESS EXAM: ICD-10-CM

## 2025-02-24 NOTE — ASSESSMENT & PLAN NOTE
Currently under Palliative care---refuses diabetes treatment    Hemoglobin A1C   Date Value Ref Range Status   09/23/2024 6.9 (H) 4.0 - 5.6 % Final     Comment:     ADA Screening Guidelines:  5.7-6.4%  Consistent with prediabetes  >or=6.5%  Consistent with diabetes    High levels of fetal hemoglobin interfere with the HbA1C  assay. Heterozygous hemoglobin variants (HbS, HgC, etc)do  not significantly interfere with this assay.   However, presence of multiple variants may affect accuracy.     05/20/2024 7.1 (H) 4.0 - 5.6 % Final     Comment:     ADA Screening Guidelines:  5.7-6.4%  Consistent with prediabetes  >or=6.5%  Consistent with diabetes    High levels of fetal hemoglobin interfere with the HbA1C  assay. Heterozygous hemoglobin variants (HbS, HgC, etc)do  not significantly interfere with this assay.   However, presence of multiple variants may affect accuracy.     09/06/2023 6.3 (H) 4.0 - 5.6 % Final     Comment:     ADA Screening Guidelines:  5.7-6.4%  Consistent with prediabetes  >or=6.5%  Consistent with diabetes    High levels of fetal hemoglobin interfere with the HbA1C  assay. Heterozygous hemoglobin variants (HbS, HgC, etc)do  not significantly interfere with this assay.   However, presence of multiple variants may affect accuracy.

## 2025-02-24 NOTE — ASSESSMENT & PLAN NOTE
Working w/ Pallative care  At the point in life no longer desire certain treatments if feel not necessary  Desire to have as much independence as possible  No recent falls  Unsteady gait

## 2025-02-24 NOTE — ASSESSMENT & PLAN NOTE
Lab Results   Component Value Date    .2 (H) 04/27/2023    CALCIUM 9.4 09/23/2024    PHOS 4.0 01/08/2024     Closely monitor           Taking Vitamin d3

## 2025-02-24 NOTE — ASSESSMENT & PLAN NOTE
Taking metolazone as needed; taking Lasix   No SOB or distress noted   bpm   Followed by cardiology and Pallative care

## 2025-02-24 NOTE — ASSESSMENT & PLAN NOTE
Lab Results   Component Value Date    EGFRNORACEVR 23.2 (A) 09/23/2024    EGFRNORACEVR 23.2 (A) 05/20/2024    EGFRNORACEVR 26.3 (A) 01/08/2024    CREATININE 2.0 (H) 09/23/2024    CREATININE 2.0 (H) 05/20/2024    CREATININE 1.8 (H) 01/08/2024    BUN 44 (H) 09/23/2024    BUN 37 (H) 05/20/2024    BUN 38 (H) 01/08/2024    ALBUMIN 3.7 09/23/2024    ALBUMIN 4.1 05/20/2024    ALBUMIN 3.6 01/08/2024   (    Managing blood pressure   Eating foods that are good for the heart and low in sodium   Exercising regularly as tolerated  Getting enough sleep   Managing blood sugar if you have diabetes   Avoiding medications that can further damage the kidneys such as NSAIDs (ibuprofen, naproxen)

## 2025-02-27 ENCOUNTER — PATIENT MESSAGE (OUTPATIENT)
Dept: CARDIOLOGY | Facility: CLINIC | Age: OVER 89
End: 2025-02-27
Payer: MEDICARE

## 2025-02-27 ENCOUNTER — TELEPHONE (OUTPATIENT)
Dept: CARDIOLOGY | Facility: HOSPITAL | Age: OVER 89
End: 2025-02-27
Payer: MEDICARE

## 2025-02-27 NOTE — TELEPHONE ENCOUNTER
Yes we can continue on current regimen for another week and see how she does.      I'm not sure about the bone scan- I did not order that for her.     Chelsea    Spoke to patient, verbalized understanding

## 2025-03-08 DIAGNOSIS — F41.9 ANXIETY: ICD-10-CM

## 2025-03-10 ENCOUNTER — PATIENT MESSAGE (OUTPATIENT)
Dept: PALLIATIVE MEDICINE | Facility: CLINIC | Age: OVER 89
End: 2025-03-10
Payer: MEDICARE

## 2025-03-11 DIAGNOSIS — F41.9 ANXIETY: Primary | Chronic | ICD-10-CM

## 2025-03-11 RX ORDER — ALPRAZOLAM 0.5 MG/1
0.5 TABLET ORAL NIGHTLY PRN
Qty: 30 TABLET | Refills: 0 | OUTPATIENT
Start: 2025-03-11

## 2025-03-11 RX ORDER — ALPRAZOLAM 0.5 MG/1
0.5 TABLET ORAL NIGHTLY PRN
Qty: 30 TABLET | Refills: 2 | Status: SHIPPED | OUTPATIENT
Start: 2025-03-11 | End: 2025-06-09

## 2025-03-17 ENCOUNTER — TELEPHONE (OUTPATIENT)
Dept: HEMATOLOGY/ONCOLOGY | Facility: CLINIC | Age: OVER 89
End: 2025-03-17
Payer: MEDICARE

## 2025-03-17 NOTE — TELEPHONE ENCOUNTER
Notified patient I will send a message to Heather  to contact her back with a sooner appt on 3/20/2025. Patient advised understanding.

## 2025-03-18 ENCOUNTER — TELEPHONE (OUTPATIENT)
Dept: PALLIATIVE MEDICINE | Facility: CLINIC | Age: OVER 89
End: 2025-03-18
Payer: MEDICARE

## 2025-03-18 ENCOUNTER — PATIENT MESSAGE (OUTPATIENT)
Dept: PALLIATIVE MEDICINE | Facility: CLINIC | Age: OVER 89
End: 2025-03-18
Payer: MEDICARE

## 2025-03-18 NOTE — TELEPHONE ENCOUNTER
----- Message from Iniki sent at 3/18/2025 10:39 AM CDT -----  .Type: Patient Call BackWho called:patientWhat is the request in detail:    calling concerning needing to get an appt scheduled. regarding reaction to medicationCan the clinic reply by [facility]?   Would the patient rather a call back or a response via [facility] call back number:  .[phone]

## 2025-03-19 ENCOUNTER — OFFICE VISIT (OUTPATIENT)
Dept: CARDIOLOGY | Facility: CLINIC | Age: OVER 89
End: 2025-03-19
Payer: MEDICARE

## 2025-03-19 VITALS
OXYGEN SATURATION: 95 % | DIASTOLIC BLOOD PRESSURE: 70 MMHG | HEART RATE: 111 BPM | BODY MASS INDEX: 30.83 KG/M2 | SYSTOLIC BLOOD PRESSURE: 110 MMHG | HEIGHT: 64 IN | WEIGHT: 180.56 LBS

## 2025-03-19 DIAGNOSIS — R54 FRAILTY SYNDROME IN GERIATRIC PATIENT: Chronic | ICD-10-CM

## 2025-03-19 DIAGNOSIS — I13.2 HYPERTENSIVE HEART AND RENAL DISEASE WITH BOTH (CONGESTIVE) HEART FAILURE AND RENAL FAILURE: Chronic | ICD-10-CM

## 2025-03-19 DIAGNOSIS — E11.22 TYPE 2 DIABETES MELLITUS WITH STAGE 4 CHRONIC KIDNEY DISEASE, WITHOUT LONG-TERM CURRENT USE OF INSULIN: Chronic | ICD-10-CM

## 2025-03-19 DIAGNOSIS — N18.4 TYPE 2 DIABETES MELLITUS WITH STAGE 4 CHRONIC KIDNEY DISEASE, WITHOUT LONG-TERM CURRENT USE OF INSULIN: Chronic | ICD-10-CM

## 2025-03-19 DIAGNOSIS — N18.4 CKD STAGE 4 SECONDARY TO HYPERTENSION: Chronic | ICD-10-CM

## 2025-03-19 DIAGNOSIS — I49.3 PVC'S (PREMATURE VENTRICULAR CONTRACTIONS): ICD-10-CM

## 2025-03-19 DIAGNOSIS — N19 HYPERTENSIVE HEART AND RENAL DISEASE WITH BOTH (CONGESTIVE) HEART FAILURE AND RENAL FAILURE: Chronic | ICD-10-CM

## 2025-03-19 DIAGNOSIS — I48.92 ATRIAL FLUTTER WITH RAPID VENTRICULAR RESPONSE: ICD-10-CM

## 2025-03-19 DIAGNOSIS — I12.9 CKD STAGE 4 SECONDARY TO HYPERTENSION: Chronic | ICD-10-CM

## 2025-03-19 DIAGNOSIS — I48.19 PERSISTENT ATRIAL FIBRILLATION: Primary | Chronic | ICD-10-CM

## 2025-03-19 PROCEDURE — 3288F FALL RISK ASSESSMENT DOCD: CPT | Mod: HCNC,CPTII,S$GLB, | Performed by: NURSE PRACTITIONER

## 2025-03-19 PROCEDURE — 1159F MED LIST DOCD IN RCRD: CPT | Mod: HCNC,CPTII,S$GLB, | Performed by: NURSE PRACTITIONER

## 2025-03-19 PROCEDURE — 1101F PT FALLS ASSESS-DOCD LE1/YR: CPT | Mod: HCNC,CPTII,S$GLB, | Performed by: NURSE PRACTITIONER

## 2025-03-19 PROCEDURE — 99214 OFFICE O/P EST MOD 30 MIN: CPT | Mod: HCNC,S$GLB,, | Performed by: NURSE PRACTITIONER

## 2025-03-19 PROCEDURE — 1126F AMNT PAIN NOTED NONE PRSNT: CPT | Mod: HCNC,CPTII,S$GLB, | Performed by: NURSE PRACTITIONER

## 2025-03-19 PROCEDURE — 99999 PR PBB SHADOW E&M-EST. PATIENT-LVL III: CPT | Mod: PBBFAC,HCNC,, | Performed by: NURSE PRACTITIONER

## 2025-03-19 RX ORDER — NEBIVOLOL 5 MG/1
5 TABLET ORAL DAILY
Qty: 30 TABLET | Refills: 11 | Status: SHIPPED | OUTPATIENT
Start: 2025-03-19 | End: 2026-03-19

## 2025-03-19 NOTE — PROGRESS NOTES
Subjective:   Patient ID:  Shirley Metz is a 89 y.o. female who presents for evaluation of Medication Reaction      HPI    Shirley Metz is a 89 year old female who presents to clinic for preop risk stratification.     Her current medical conditions include PAF on Eliquis, HFpEF, Anxiety, HTN, CKD Stage IV, obesity, DM Type II.     Metoprolol increased to 75 mg BID for the past 24 hours.     Not using metolazone regularly.   Using Tylenol, no NSAIDS regularly.     She does endorse recent issues with shortness of breath and fluid retention since increased heart rates. One of her eye drops were stopped to assist with improvement in HR control in an effort to approve her for surgery in February.     Using walker for fall prevention.     Denies chest pain or anginal equivalents. No shortness of breath, RUIZ or palpitations. Denies orthopnea, PND or abdominal bloating. Reports regular walking without any issues lately. NO leg swelling or claudications. No recent falls, syncope or near syncopal events. Reports compliance with medications and dietary restrictions. NO CNS complaints to suggest TIA or CVA today. No signs of abnormal bleeding on Eliquis.     3/22/2024 update    Shirley Metz returns for follow up.    She has had both eye surgeries.     Reviewed home log today-episodes of HR 80-90s at home, some HR in 120s at times but feels much better.     BP well controlled today in office and on home log.     Denies any chest pain or anginal equivalents. Denies any palpitations.   No signs of abnormal bleeding on eliquis.   Taking metoprolol 50 mg BID  Still not driving.     Shortness of breath stable today.     Intermittent ankle edema.  Doing well with lasix BID.  Used Zaroxolyn only once.       Using rollator. No syncope or near syncopal events. Denies any recent falls.     Has multiple complaints to neck and shoulders today, may benefit from physical therapy.   Has worsening itching to  scalp and forehead as well today.     2/19/2025 update    Shirley Metz returns for follow up.    Has been having worsening hypotension. When she takes 4 doses of metoprolol she has more issues with constipation and dry eyes.     She is taking metolazone once a week. Has residual leg swelling in AM which is not completely gone as previously.     Can take 2 tabs of midodrine for low BP episodes.     Using rollator for fall prevention. Denies any recent falls.   Does notice that she is weaker than previously.   Taking eliquis for cardio-embolic protection.       3/19/2025 update    Shirley Metz returns for follow up.     Has complaints with worsening side effects from metoprolol with dry eyes, lip irritation, weakness    Has generalized pain throughout her entire body and left hip.    Has worsening issues with RUIZ since last visit.     Feels very weak and has to take frequent breaks to complete tasks.     Wants to try alternative medical therapy, reviewed numerous drug allergies in detail with patient today. She is progressively getting weaker with more side effects from medications. She is willing to take the risk of possible side effects from new med. We will trial bystolic to see if any improvement in symptoms.     Past Medical History:   Diagnosis Date    Acute on chronic diastolic congestive heart failure 01/11/2023    Anemia 11/29/2018    Anxiety 11/28/2017    Arthritis     Atrial fibrillation with RVR 10/09/2019    Back pain     Basal cell carcinoma 03/29/2018    left mid back    Chronic diastolic congestive heart failure 10/15/2019    CKD (chronic kidney disease) stage 3, GFR 30-59 ml/min 08/08/2016    Colon polyps     reported per patient.     Coronary artery calcification 10/05/2021    Elevated liver enzymes 12/20/2019    Elevated troponin 03/15/2018    Fuchs' corneal dystrophy     General anesthetics causing adverse effect in therapeutic use     woke up during surgery and gagged on ET  tube    Glaucoma     Glaucoma     Heart failure with preserved left ventricular function (HFpEF) 07/24/2018    Hyperlipidemia     Hypertension     Macular degeneration dry    Medication side effects 05/03/2017    Obesity     Pre-diabetes 12/04/2017    PVD (peripheral vascular disease) 04/26/2021    PVD (peripheral vascular disease) 04/26/2021    Squamous cell carcinoma 01/08/2019    left shoulder    Stenosis of carotid artery 10/05/2021    Troponin level elevated 01/11/2023    Trouble in sleeping     Type 2 diabetes mellitus without complication, without long-term current use of insulin 02/24/2021    Urinary tract infection        Past Surgical History:   Procedure Laterality Date    ABLATION OF ARRHYTHMOGENIC FOCUS FOR ATRIAL FIBRILLATION N/A 11/03/2022    Procedure: Ablation atrial fibrillation;  Surgeon: Toi Kelly MD;  Location: [facility] EP LAB;  Service: Cardiology;  Laterality: N/A;  afib, PVI/CTI, RFA, STACEY (cx if SR), DEDE, anes, MB, 3 Prep    ADENOIDECTOMY  1938    BREAST BIOPSY Left     1997. benign    BREAST CYST EXCISION Left 1997 approx    CARPAL TUNNEL RELEASE Right 2012 approx    CATARACT EXTRACTION Bilateral 1994    CHOLECYSTECTOMY  1975    CORNEAL TRANSPLANT Bilateral 08/2015 8/2015 - left; Right 4/2016    EYE SURGERY  12/06/2023    Fuch's Corneal dystrophy - had 2nd operation with Dr. Quintanilla    EYE SURGERY      Cataract wIOL    left thumb Left 2011    removed cuboid for arthritis    REVERSE TOTAL SHOULDER ARTHROPLASTY Left 08/21/2018    Procedure: ARTHROPLASTY, SHOULDER, TOTAL, REVERSE;  Surgeon: Solomon Lujan MD;  Location: [facility];  Service: Orthopedics;  Laterality: Left;  WITH BICEP TENODESIS    SKIN CANCER EXCISION Left 2017    BCC removal by Dr. Valadez    SLT- OS (aka TRABECULOPLASTY) Left 05/11/2011    repeat 3/14/12    TENOTOMY Left 08/21/2018    Procedure: TENOTOMY;  Surgeon: Solomon Lujan MD;  Location: [facility] OR;  Service: Orthopedics;  Laterality: Left;    TONSILLECTOMY   1938    TRANSESOPHAGEAL ECHOCARDIOGRAM WITH POSSIBLE CARDIOVERSION (STACEY W/ POSS CARDIOVERSION) N/A 03/21/2023    Procedure: Transesophageal echo (STACEY) intra-procedure log documentation;  Surgeon: Trevon Ramirez MD;  Location: [facility] CATH LAB;  Service: Cardiology;  Laterality: N/A;    TREATMENT OF CARDIAC ARRHYTHMIA N/A 10/10/2019    Procedure: CARDIOVERSION;  Surgeon: Pete Durán MD;  Location: [facility] CATH LAB;  Service: Cardiology;  Laterality: N/A;    TREATMENT OF CARDIAC ARRHYTHMIA N/A 12/06/2019    Procedure: CARDIOVERSION;  Surgeon: Samy Castillo MD;  Location: [facility] CATH LAB;  Service: Cardiology;  Laterality: N/A;       Social History     Tobacco Use    Smoking status: Never    Smokeless tobacco: Never    Tobacco comments:     history of passive smoking from her ex-   Substance Use Topics    Alcohol use: Yes     Alcohol/week: 2.0 standard drinks of alcohol     Types: 2 Standard drinks or equivalent per week     Comment: occ    Drug use: No       Family History   Problem Relation Name Age of Onset    Heart disease Mother      Hypertension Mother      Cancer Father  88        sarcoma( ?[place]'s ) on leg    Deep vein thrombosis Neg Hx      Ovarian cancer Neg Hx      Breast cancer Neg Hx      Kidney disease Neg Hx      Strabismus Neg Hx      Retinal detachment Neg Hx      Macular degeneration Neg Hx      Glaucoma Neg Hx      Blindness Neg Hx      Amblyopia Neg Hx      Eczema Neg Hx      Lupus Neg Hx      Melanoma Neg Hx      Psoriasis Neg Hx      Diabetes Neg Hx      Stroke Neg Hx      Intellectual disability Neg Hx      Mental illness Neg Hx      Hyperlipidemia Neg Hx      COPD Neg Hx      Asthma Neg Hx      Depression Neg Hx      Alcohol abuse Neg Hx      Drug abuse Neg Hx         Wt Readings from Last 3 Encounters:   03/19/25 81.9 kg (180 lb 8.9 oz)   02/21/25 83.6 kg (184 lb 3.1 oz)   02/19/25 83.1 kg (183 lb 3.2 oz)     Temp Readings from Last 3 Encounters:   02/21/25 96.7 °F (35.9 °C) (Temporal)    02/10/25 97.6 °F (36.4 °C)   01/03/25 96.9 °F (36.1 °C) (Temporal)     BP Readings from Last 3 Encounters:   03/19/25 110/70   02/21/25 122/70   02/19/25 102/80     Pulse Readings from Last 3 Encounters:   03/19/25 (!) 111   02/21/25 (!) 115   02/19/25 (!) 111       Current Outpatient Medications on File Prior to Visit   Medication Sig Dispense Refill    acetaminophen (TYLENOL) 500 MG tablet Take 500 mg by mouth nightly as needed.      ALPRAZolam (XANAX) 0.5 MG tablet Take 1 tablet (0.5 mg total) by mouth nightly as needed for Anxiety. 30 tablet 2    b complex vitamins capsule Take 1 capsule by mouth once daily.      calcium carbonate/vitamin D3 (VITAMIN D-3 ORAL) Take 4,000 Int'l Units/day by mouth once daily.      ELIQUIS 2.5 mg Tab TAKE 1 TABLET (2.5 MG TOTAL) BY MOUTH 2 (TWO) TIMES DAILY. 180 tablet 1    furosemide (LASIX) 40 MG tablet Take 1 tab in am and 1/2 tab in evening 90 tablet 1    latanoprostene bunod (VYZULTA) 0.024 % Drop Place 1 drop into both eyes every evening. 2.5 mL 12    metOLazone (ZAROXOLYN) 2.5 MG tablet Take 1 tablet (2.5 mg total) by mouth as needed (aprx once weekly). 12 tablet 3    midodrine (PROAMATINE) 2.5 MG Tab Take 1 tablet (2.5 mg total) by mouth 3 (three) times daily as needed (low BP). 90 tablet 11    [DISCONTINUED] metoprolol tartrate (LOPRESSOR) 25 MG tablet Take 4 tablets (100 mg total) by mouth 2 (two) times daily. 720 tablet 0    [DISCONTINUED] cloNIDine (CATAPRES) 0.1 MG tablet Take 1 tablet (0.1 mg total) by mouth 2 (two) times daily as needed (systolic BP over 180). 20 tablet 1    [DISCONTINUED] isosorbide mononitrate (IMDUR) 30 MG 24 hr tablet Take 1 tablet (30 mg total) by mouth every evening. 90 tablet 1     No current facility-administered medications on file prior to visit.       No cardiac monitor results found for the past 12 months    Results for orders placed during the hospital encounter of 07/11/22    Echo    Interpretation Summary  · Concentric remodeling and  normal systolic function.  · The estimated ejection fraction is 60%.  · Normal left ventricular diastolic function.  · Normal right ventricular size with normal right ventricular systolic function.    No results found for this or any previous visit.        Results for orders placed or performed during the hospital encounter of 11/04/24   SCHEDULED EKG 12-LEAD (to Muse)    Collection Time: 11/04/24  9:46 AM   Result Value Ref Range    QRS Duration 74 ms    [facility] QTC Calculation 474 ms    Narrative    Test Reason : I10,I13.2,N19,    Vent. Rate : 108 BPM     Atrial Rate : 108 BPM     P-R Int : 118 ms          QRS Dur : 074 ms      QT Int : 354 ms       P-R-T Axes : 087 091 084 degrees     QTc Int : 474 ms    Atrial flutter  Rightward axis  Nonspecific ST abnormality  Abnormal ECG    Confirmed by Maribel Oneal MD ([id number]) on 11/5/2024 11:02:17 AM    Referred By: RADHA CORREIA           Confirmed By:Maribel Oneal MD         Review of Systems   Constitutional: Positive for malaise/fatigue.   HENT:  Negative for hearing loss and hoarse voice.    Eyes:  Negative for blurred vision and visual disturbance.   Cardiovascular:  Positive for dyspnea on exertion. Negative for chest pain, claudication, irregular heartbeat, leg swelling, near-syncope, orthopnea, palpitations, paroxysmal nocturnal dyspnea and syncope.   Respiratory:  Positive for shortness of breath. Negative for cough, hemoptysis, sleep disturbances due to breathing, snoring and wheezing.    Endocrine: Negative for cold intolerance and heat intolerance.   Hematologic/Lymphatic: Bruises/bleeds easily (on eliquis).   Skin:  Negative for color change, dry skin and nail changes.   Musculoskeletal:  Positive for arthritis and back pain. Negative for joint pain and myalgias.   Gastrointestinal:  Negative for bloating, abdominal pain, constipation, nausea and vomiting.   Genitourinary:  Negative for dysuria, flank pain, hematuria and hesitancy.   Neurological:   "Positive for weakness. Negative for headaches, light-headedness, loss of balance, numbness and paresthesias.   Psychiatric/Behavioral:  Negative for altered mental status.    Allergic/Immunologic: Negative for environmental allergies.         Objective:/70 (BP Location: Right arm, Patient Position: Sitting)   Pulse (!) 111   Ht 5' 4" (1.626 m)   Wt 81.9 kg (180 lb 8.9 oz)   LMP  (LMP Unknown)   SpO2 95%   BMI 30.99 kg/m²      Physical Exam  Vitals and nursing note reviewed.   Constitutional:       General: She is not in acute distress.     Appearance: Normal appearance. She is well-developed. She is obese. She is not ill-appearing.   HENT:      Head: Normocephalic and atraumatic.      Nose: Nose normal.      Mouth/Throat:      Mouth: Mucous membranes are moist.   Eyes:      Pupils: Pupils are equal, round, and reactive to light.   Neck:      Thyroid: No thyromegaly.      Vascular: No JVD.      Trachea: No tracheal deviation.   Cardiovascular:      Rate and Rhythm: Tachycardia present. Rhythm irregularly irregular.      Chest Wall: PMI is not displaced.      Pulses: Intact distal pulses.           Radial pulses are 2+ on the right side and 2+ on the left side.        Dorsalis pedis pulses are 2+ on the right side and 2+ on the left side.      Heart sounds: S1 normal and S2 normal. Heart sounds not distant. No murmur heard.  Pulmonary:      Effort: Pulmonary effort is normal. No respiratory distress.      Breath sounds: Normal breath sounds. No wheezing.   Abdominal:      General: Bowel sounds are normal. There is no distension.      Palpations: Abdomen is soft.      Tenderness: There is no abdominal tenderness.   Musculoskeletal:         General: No swelling. Normal range of motion.      Cervical back: Full passive range of motion without pain, normal range of motion and neck supple.      Right lower leg: No edema.      Left lower leg: No edema.      Right ankle: No swelling.      Left ankle: No swelling. "   Skin:     General: Skin is warm and dry.      Capillary Refill: Capillary refill takes less than 2 seconds.      Nails: There is no clubbing.   Neurological:      General: No focal deficit present.      Mental Status: She is alert and oriented to person, place, and time.   Psychiatric:         Speech: Speech normal.         Behavior: Behavior normal.         Thought Content: Thought content normal.         Judgment: Judgment normal.         Lab Results   Component Value Date    CHOL 170 08/04/2023    CHOL 168 08/31/2022    CHOL 232 (H) 07/19/2021     Lab Results   Component Value Date    HDL 40 08/04/2023    HDL 43 08/31/2022    HDL 48 07/19/2021     Lab Results   Component Value Date    LDLCALC 106.8 08/04/2023    LDLCALC 105.4 08/31/2022    LDLCALC 155.8 07/19/2021     Lab Results   Component Value Date    TRIG 116 08/04/2023    TRIG 98 08/31/2022    TRIG 141 07/19/2021     Lab Results   Component Value Date    CHOLHDL 23.5 08/04/2023    CHOLHDL 25.6 08/31/2022    CHOLHDL 20.7 07/19/2021       Chemistry        Component Value Date/Time     09/23/2024 1051    K 3.9 09/23/2024 1051     09/23/2024 1051    CO2 29 09/23/2024 1051    BUN 44 (H) 09/23/2024 1051    CREATININE 2.0 (H) 09/23/2024 1051     (H) 09/23/2024 1051        Component Value Date/Time    CALCIUM 9.4 09/23/2024 1051    ALKPHOS 70 09/23/2024 1051    AST 19 09/23/2024 1051    ALT 13 09/23/2024 1051    BILITOT 0.6 09/23/2024 1051    ESTGFRAFRICA 30 (A) 07/26/2022 1348    EGFRNONAA 26 (A) 07/26/2022 1348          Lab Results   Component Value Date    TSH 3.577 04/27/2023     Lab Results   Component Value Date    INR 1.1 10/25/2022    INR 1.0 02/12/2020    INR 1.0 01/25/2018     Lab Results   Component Value Date    WBC 7.07 09/23/2024    HGB 13.1 09/23/2024    HCT 39.9 09/23/2024     (H) 09/23/2024     09/23/2024          Assessment:      1. Persistent atrial fibrillation    2. 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 PVC's (premature ventricular contractions)    4. Atrial flutter with rapid ventricular response    5. 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    6. Type 2 diabetes mellitus with stage 4 chronic kidney disease, without long-term current use of insulin    7. 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   Send message in 1 week to update me on progress      Nicole May, FNP-C [facility]

## 2025-03-20 ENCOUNTER — LAB VISIT (OUTPATIENT)
Dept: LAB | Facility: HOSPITAL | Age: OVER 89
End: 2025-03-20
Attending: NURSE PRACTITIONER
Payer: MEDICARE

## 2025-03-20 ENCOUNTER — OFFICE VISIT (OUTPATIENT)
Dept: PALLIATIVE MEDICINE | Facility: CLINIC | Age: OVER 89
End: 2025-03-20
Payer: MEDICARE

## 2025-03-20 VITALS
DIASTOLIC BLOOD PRESSURE: 71 MMHG | SYSTOLIC BLOOD PRESSURE: 113 MMHG | WEIGHT: 179.44 LBS | OXYGEN SATURATION: 96 % | BODY MASS INDEX: 30.63 KG/M2 | TEMPERATURE: 98 F | HEART RATE: 112 BPM | HEIGHT: 64 IN

## 2025-03-20 DIAGNOSIS — I95.9 HYPOTENSION, UNSPECIFIED HYPOTENSION TYPE: ICD-10-CM

## 2025-03-20 DIAGNOSIS — I13.2 HYPERTENSIVE HEART AND RENAL DISEASE WITH BOTH (CONGESTIVE) HEART FAILURE AND RENAL FAILURE: Primary | Chronic | ICD-10-CM

## 2025-03-20 DIAGNOSIS — I48.91 ATRIAL FIBRILLATION WITH RVR: Chronic | ICD-10-CM

## 2025-03-20 DIAGNOSIS — Z51.5 ENCOUNTER FOR PALLIATIVE CARE: ICD-10-CM

## 2025-03-20 DIAGNOSIS — N19 HYPERTENSIVE HEART AND RENAL DISEASE WITH BOTH (CONGESTIVE) HEART FAILURE AND RENAL FAILURE: Primary | Chronic | ICD-10-CM

## 2025-03-20 DIAGNOSIS — I13.2 HYPERTENSIVE HEART AND RENAL DISEASE WITH BOTH (CONGESTIVE) HEART FAILURE AND RENAL FAILURE: Chronic | ICD-10-CM

## 2025-03-20 DIAGNOSIS — M25.552 LEFT-SIDED ISCHIAL PAIN: ICD-10-CM

## 2025-03-20 DIAGNOSIS — N19 HYPERTENSIVE HEART AND RENAL DISEASE WITH BOTH (CONGESTIVE) HEART FAILURE AND RENAL FAILURE: Chronic | ICD-10-CM

## 2025-03-20 PROBLEM — R29.898 COGWHEEL RIGIDITY: Status: RESOLVED | Noted: 2024-05-20 | Resolved: 2025-03-20

## 2025-03-20 LAB
ALBUMIN SERPL BCP-MCNC: 4.1 G/DL (ref 3.5–5.2)
ALP SERPL-CCNC: 65 U/L (ref 40–150)
ALT SERPL W/O P-5'-P-CCNC: 17 U/L (ref 10–44)
ANION GAP SERPL CALC-SCNC: 17 MMOL/L (ref 8–16)
AST SERPL-CCNC: 23 U/L (ref 10–40)
BASOPHILS # BLD AUTO: 0.07 K/UL (ref 0–0.2)
BASOPHILS NFR BLD: 0.7 % (ref 0–1.9)
BILIRUB SERPL-MCNC: 0.4 MG/DL (ref 0.1–1)
BUN SERPL-MCNC: 58 MG/DL (ref 10–30)
CALCIUM SERPL-MCNC: 9.8 MG/DL (ref 8.7–10.5)
CHLORIDE SERPL-SCNC: 98 MMOL/L (ref 95–110)
CK SERPL-CCNC: 50 U/L (ref 20–180)
CO2 SERPL-SCNC: 26 MMOL/L (ref 23–29)
CREAT SERPL-MCNC: 2.1 MG/DL (ref 0.5–1.4)
DIFFERENTIAL METHOD BLD: ABNORMAL
EOSINOPHIL # BLD AUTO: 0.3 K/UL (ref 0–0.5)
EOSINOPHIL NFR BLD: 3.2 % (ref 0–8)
ERYTHROCYTE [DISTWIDTH] IN BLOOD BY AUTOMATED COUNT: 13.2 % (ref 11.5–14.5)
EST. GFR  (NO RACE VARIABLE): 21.7 ML/MIN/1.73 M^2
GLUCOSE SERPL-MCNC: 140 MG/DL (ref 70–110)
HCT VFR BLD AUTO: 42.2 % (ref 37–48.5)
HGB BLD-MCNC: 13.5 G/DL (ref 12–16)
IMM GRANULOCYTES # BLD AUTO: 0.03 K/UL (ref 0–0.04)
IMM GRANULOCYTES NFR BLD AUTO: 0.3 % (ref 0–0.5)
LYMPHOCYTES # BLD AUTO: 2.1 K/UL (ref 1–4.8)
LYMPHOCYTES NFR BLD: 22.2 % (ref 18–48)
MCH RBC QN AUTO: 33.3 PG (ref 27–31)
MCHC RBC AUTO-ENTMCNC: 32 G/DL (ref 32–36)
MCV RBC AUTO: 104 FL (ref 82–98)
MONOCYTES # BLD AUTO: 0.9 K/UL (ref 0.3–1)
MONOCYTES NFR BLD: 9.7 % (ref 4–15)
NEUTROPHILS # BLD AUTO: 6.1 K/UL (ref 1.8–7.7)
NEUTROPHILS NFR BLD: 63.9 % (ref 38–73)
NRBC BLD-RTO: 0 /100 WBC
PLATELET # BLD AUTO: 262 K/UL (ref 150–450)
PMV BLD AUTO: 10.3 FL (ref 9.2–12.9)
POTASSIUM SERPL-SCNC: 3.5 MMOL/L (ref 3.5–5.1)
PROT SERPL-MCNC: 8.1 G/DL (ref 6–8.4)
RBC # BLD AUTO: 4.06 M/UL (ref 4–5.4)
SODIUM SERPL-SCNC: 141 MMOL/L (ref 136–145)
T4 FREE SERPL-MCNC: 0.91 NG/DL (ref 0.71–1.51)
TSH SERPL DL<=0.005 MIU/L-ACNC: 1.66 UIU/ML (ref 0.4–4)
WBC # BLD AUTO: 9.52 K/UL (ref 3.9–12.7)

## 2025-03-20 PROCEDURE — 84443 ASSAY THYROID STIM HORMONE: CPT | Mod: HCNC | Performed by: NURSE PRACTITIONER

## 2025-03-20 PROCEDURE — 1123F ACP DISCUSS/DSCN MKR DOCD: CPT | Mod: HCNC,CPTII,S$GLB, | Performed by: NURSE PRACTITIONER

## 2025-03-20 PROCEDURE — 99417 PROLNG OP E/M EACH 15 MIN: CPT | Mod: HCNC,S$GLB,, | Performed by: NURSE PRACTITIONER

## 2025-03-20 PROCEDURE — 36415 COLL VENOUS BLD VENIPUNCTURE: CPT | Mod: HCNC | Performed by: NURSE PRACTITIONER

## 2025-03-20 PROCEDURE — 1159F MED LIST DOCD IN RCRD: CPT | Mod: HCNC,CPTII,S$GLB, | Performed by: NURSE PRACTITIONER

## 2025-03-20 PROCEDURE — 3288F FALL RISK ASSESSMENT DOCD: CPT | Mod: HCNC,CPTII,S$GLB, | Performed by: NURSE PRACTITIONER

## 2025-03-20 PROCEDURE — 1125F AMNT PAIN NOTED PAIN PRSNT: CPT | Mod: HCNC,CPTII,S$GLB, | Performed by: NURSE PRACTITIONER

## 2025-03-20 PROCEDURE — 99999 PR PBB SHADOW E&M-EST. PATIENT-LVL IV: CPT | Mod: PBBFAC,HCNC,, | Performed by: NURSE PRACTITIONER

## 2025-03-20 PROCEDURE — 82550 ASSAY OF CK (CPK): CPT | Mod: HCNC | Performed by: NURSE PRACTITIONER

## 2025-03-20 PROCEDURE — 99215 OFFICE O/P EST HI 40 MIN: CPT | Mod: HCNC,S$GLB,, | Performed by: NURSE PRACTITIONER

## 2025-03-20 PROCEDURE — 84439 ASSAY OF FREE THYROXINE: CPT | Mod: HCNC | Performed by: NURSE PRACTITIONER

## 2025-03-20 PROCEDURE — 85025 COMPLETE CBC W/AUTO DIFF WBC: CPT | Mod: HCNC | Performed by: NURSE PRACTITIONER

## 2025-03-20 PROCEDURE — 80053 COMPREHEN METABOLIC PANEL: CPT | Mod: HCNC | Performed by: NURSE PRACTITIONER

## 2025-03-20 PROCEDURE — 1101F PT FALLS ASSESS-DOCD LE1/YR: CPT | Mod: HCNC,CPTII,S$GLB, | Performed by: NURSE PRACTITIONER

## 2025-03-20 PROCEDURE — 1160F RVW MEDS BY RX/DR IN RCRD: CPT | Mod: HCNC,CPTII,S$GLB, | Performed by: NURSE PRACTITIONER

## 2025-03-20 RX ORDER — MIDODRINE HYDROCHLORIDE 2.5 MG/1
2.5 TABLET ORAL 3 TIMES DAILY
Qty: 90 TABLET | Refills: 11 | Status: SHIPPED | OUTPATIENT
Start: 2025-03-20 | End: 2026-03-20

## 2025-03-20 NOTE — ASSESSMENT & PLAN NOTE
Goals of care: Ms. Rg is hopeful to manage symptoms and remain independent as long as possible.   Advanced directive: Would not want any prolonged ventilator support. Would not want feeding tube placed for conditions from which she is not expected to recover. She has made her family aware of her wishes. Declined completion of living will and [place]  HC POA: Son Boris and daughter-in-law; they have copy of HC POA and know her HC wishes. Encouraged to bring a copy for records at [facility]  Symptoms: generalized weakness  Follow up: 1 month, sooner if needed.

## 2025-03-20 NOTE — ASSESSMENT & PLAN NOTE
Difficult to control rate, not responding to metoprolol. Cardiology is starting Bystolic instead. I addressed her questions about medication interaction. She is concerned about taking Bystolic with her glaucoma. She has appt with oph Monday and will discuss with him.   We discussed risks and benefits of Bystolic.   We had a jose alfredo discussion about Afib that has been difficult despite maximum medical and interventional mgmt. We discussed mgmt options are limited. I strongly encouraged her to begin Bystolic as prescribed.   She has not been taking midodrine unless BP very low. I encouraged her to take routinely to hopefully improve cardiac output and minimize fluid overload sx. She agrees to do so and will monitor her BP and HR closely.

## 2025-03-20 NOTE — ASSESSMENT & PLAN NOTE
Has been seen by ortho and had imaging previously.  Same location, not larger in size but more painful.   Small crust to area, about 5 mm. No induration, nodule or fluctuance.

## 2025-03-20 NOTE — PROGRESS NOTES
Palliative Medicine Clinic Note       Chief Complaint:   Chief Complaint   Patient presents with    Pain And Symptom Management        ASSESSMENT/PLAN:      Plan/Recommendations:  1. 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 Hypotension, unspecified hypotension type  -     midodrine (PROAMATINE) 2.5 MG Tab; Take 1 tablet (2.5 mg total) by mouth 3 (three) times daily.  Dispense: 90 tablet; Refill: 11    3. Atrial fibrillation with RVR  Assessment & Plan:  Difficult to control rate, not responding to metoprolol. Cardiology is starting Bystolic instead. I addressed her questions about medication interaction. She is concerned about taking Bystolic with her glaucoma. She has appt with oph Monday and will discuss with him.   We discussed risks and benefits of Bystolic.   We had a jose alfredo discussion about Afib that has been difficult despite maximum medical and interventional mgmt. We discussed mgmt options are limited. I strongly encouraged her to begin Bystolic as prescribed.   She has not been taking midodrine unless BP very low. I encouraged her to take routinely to hopefully improve cardiac output and minimize fluid overload sx. She agrees to do so and will monitor her BP and HR closely.     Orders:  -     TSH; Future; Expected date: 03/20/2025  -     T4, Free; Future; Expected date: 03/20/2025    4. Encounter for palliative care  Assessment & Plan:  Goals of care: Ms. Rg is hopeful to manage symptoms and remain independent as long as possible.   Advanced directive: Would not want any prolonged ventilator support. Would not want feeding tube placed for conditions from which she is not expected to recover. She has made her family aware of her wishes. Declined completion of living will  and LaPOST  HC POA: Son Boris and daughter-in-law; they have copy of HC POA and know her HC wishes. Encouraged to bring a copy for records at [facility]  Symptoms: generalized weakness  Follow up: 1 month, sooner if needed.      5. Left-sided ischial pain  Assessment & Plan:  Has been seen by ortho and had imaging previously.  Same location, not larger in size but more painful.   Small crust to area, about 5 mm. No induration, nodule or fluctuance.           Advance Care Planning   Advance Directives:   Living Will: No    LaPOST: No    Do Not Resuscitate Status: No    Medical Power of : Yes      Decision Making:  Patient answered questions  Goals of Care: What is most important right now is to focus on avoiding the hospital, symptom/pain control, improvement in condition but with limits to invasive therapies. Accordingly, we have decided that the best plan to meet the patient's goals includes continuing with treatment.           Thank you for involving me in the care of this patient.     No follow-ups on file.    Future Appointments   Date Time Provider Department Center   4/21/2025  9:30 AM Heather Miller NP [facility] [place]   5/23/2025  1:00 PM Phylicia Deleon MD [facility]   5/26/2025  1:00 PM Jo-Ann Jacobo PA-C [facility]   12/10/2025  9:45 AM Toi Kelly MD [facility]        SUBJECTIVE:      History of Present Illness / Interval History:  Shirley Metz is 89 y.o. 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 Presents to Palliative Care Clinic for physical symptoms, advance care planning and additional support.. Please see cardiology for more details.       3/20/25  History obtained from: patient and medical record.    Increased soreness to area to hip/pelvis. Open area and increased pain for the past  week.  Walking is worse. Requiring increased breaks, furniture walking.   Increased RUIZ, minimal activity.   Myalgias neck downward. Joint pain and muscle pain arms, hands. Sharp. Continues with swelling to trunk. Taking metolazone PRN. Also generalized swelling.     Dry eye is worse. Mouth is dry. Skin is dry despite lubricating.   Believes metoprolol no longer effective. To begin Bystolic and wean metoprolol.   Agrees to start midodrine scheduled instead of as needed.   AM stiffness, lasts 2-3 hours.     Sleeps well as long as she takes alprazolam.     Today we discussed symptom management and goals of care.        ROS:  Review of Systems    Palliative Exam    Medications:  Current Medications[1]    External  database queried on 03/20/2025  by Heather CHU :     N/A    Review of patient's allergies indicates:   Allergen Reactions    Carvedilol Swelling     Lip swelling    Cephalexin Other (See Comments)     Muscle/joint weakness unable to move     Doxycycline Shortness Of Breath, Nausea And Vomiting and Other (See Comments)     weakness    Erythromycin Other (See Comments)     Complete weakness/could not walk    Gadolinium-containing contrast media Swelling     angioedema    Hydrocodone-acetaminophen Shortness Of Breath     wheezing    Labetalol Shortness Of Breath, Nausea Only, Palpitations and Other (See Comments)     weakness    Loratadine Other (See Comments)     Double vision/spots  Other reaction(s): double vision    Losartan Other (See Comments)     weakness    Loteprednol etabonate Other (See Comments), Palpitations, Anxiety and Shortness Of Breath     Patient stated bp went up and extreme muscle weakness    Naproxen      wheezing  wheezing  wheezing  Other reaction(s): wheezing    Prednisone Other (See Comments)     Myalgias and generalized weakness, unable to walk    Statins-hmg-coa reductase inhibitors Other (See Comments)     Severe Muscle weakness  Muscle weakness  Severe Muscle  weakness  Other reaction(s): severe muscle weakness    Amlodipine Other (See Comments)     Myalgias    Fenofibrate Other (See Comments)     muscle weakness      Hydralazine Other (See Comments)     muscle tremors    Loteprednol Other (See Comments)     increased BP    Macrolide antibiotics Other (See Comments)     Complete weakness/could not walk      Moxifloxacin Hives and Other (See Comments)     muscle soreness    Timolol Other (See Comments)     Blurred vision, Burning eyes, Severe muscle weakness, eye problems.      Verapamil Diarrhea     Severe diarrhea.      Hydrocortisone     Isosorbide      Tolerates Imdur    Silver sulfadiaz-foam bandage     Tetanus and diphtheria toxoids     Tetanus vaccines and toxoid     Adhesive Rash     Clonidine patch--contact dermatitis    Codeine Diarrhea and Other (See Comments)     Loss of balance       Doxazosin Palpitations    Fexofenadine Other (See Comments)     Double vision/spots       Hydrocortisone (bulk) Other (See Comments)     Only suppository/ caused muscle weakness    Latanoprost Other (See Comments)     Muscle weakness      Olopatadine Other (See Comments)     Muscle weakness          OBJECTIVE:   Physical Exam:  Vitals: Temp: 97.8 °F (36.6 °C) (03/20/25 1121)  Pulse: (!) 112 (03/20/25 1121)  BP: 113/71 (03/20/25 1121)  SpO2: 96 % (03/20/25 1121)    Physical Exam  Constitutional:       General: She is not in acute distress.     Appearance: She is not ill-appearing.   Eyes:      General: No scleral icterus.  Skin:     General: Skin is warm and dry.      Comments: <5 mm crust left upper posterior thigh near buttock. No induration or nodule. Mild erythema.   Neurological:      Mental Status: She is alert.      Gait: Gait normal.   Psychiatric:         Mood and Affect: Mood normal.         Behavior: Behavior normal.         Thought Content: Thought content normal.         Judgment: Judgment normal.         Wt Readings from Last 3 Encounters:   03/20/25 1121 81.4 kg (179  lb 7.3 oz)   03/19/25 1057 81.9 kg (180 lb 8.9 oz)   02/21/25 1249 83.6 kg (184 lb 3.1 oz)     BP Readings from Last 3 Encounters:   03/20/25 113/71   03/19/25 110/70   02/21/25 122/70       Labs:  Lab Results   Component Value Date    WBC 7.07 09/23/2024    HGB 13.1 09/23/2024    HCT 39.9 09/23/2024     (H) 09/23/2024     09/23/2024           Imaging:  Reviewed       I spent a total of 69 minutes on the day of the visit. This includes face to face time in discussion of goals of care, symptom assessment, coordination of care and emotional support.  This also includes non-face to face time preparing to see the patient (eg, review of tests/imaging), obtaining and/or reviewing separately obtained history, documenting clinical information in the electronic or other health record, independently interpreting results and communicating results to the patient/family/caregiver, or care coordinator.     Additional 5 min time spent on a voluntary advance care planning and /or goals of care discussion, providing emotional support, formulating and communicating prognosis and exploring burden/benefit of various approaches of treatment.       Heather Miller NP         [1]   Current Outpatient Medications:     acetaminophen (TYLENOL) 500 MG tablet, Take 500 mg by mouth nightly as needed., Disp: , Rfl:     ALPRAZolam (XANAX) 0.5 MG tablet, Take 1 tablet (0.5 mg total) by mouth nightly as needed for Anxiety., Disp: 30 tablet, Rfl: 2    b complex vitamins capsule, Take 1 capsule by mouth once daily., Disp: , Rfl:     calcium carbonate/vitamin D3 (VITAMIN D-3 ORAL), Take 4,000 Int'l Units/day by mouth once daily., Disp: , Rfl:     ELIQUIS 2.5 mg Tab, TAKE 1 TABLET (2.5 MG TOTAL) BY MOUTH 2 (TWO) TIMES DAILY., Disp: 180 tablet, Rfl: 1    furosemide (LASIX) 40 MG tablet, Take 1 tab in am and 1/2 tab in evening, Disp: 90 tablet, Rfl: 1    latanoprostene bunod (VYZULTA) 0.024 % Drop, Place 1 drop into both eyes every  evening., Disp: 2.5 mL, Rfl: 12    metOLazone (ZAROXOLYN) 2.5 MG tablet, Take 1 tablet (2.5 mg total) by mouth as needed (aprx once weekly)., Disp: 12 tablet, Rfl: 3    nebivoloL (BYSTOLIC) 5 MG Tab, Take 1 tablet (5 mg total) by mouth once daily., Disp: 30 tablet, Rfl: 11    midodrine (PROAMATINE) 2.5 MG Tab, Take 1 tablet (2.5 mg total) by mouth 3 (three) times daily., Disp: 90 tablet, Rfl: 11

## 2025-03-21 ENCOUNTER — RESULTS FOLLOW-UP (OUTPATIENT)
Dept: PALLIATIVE MEDICINE | Facility: CLINIC | Age: OVER 89
End: 2025-03-21

## 2025-03-21 ENCOUNTER — TELEPHONE (OUTPATIENT)
Dept: HEMATOLOGY/ONCOLOGY | Facility: CLINIC | Age: OVER 89
End: 2025-03-21
Payer: MEDICARE

## 2025-03-21 NOTE — TELEPHONE ENCOUNTER
----- Message from Heather Miller NP sent at 3/21/2025  8:38 AM CDT -----  Please let patient know that results are at baseline.   ----- Message -----  From: [facility]  Sent: 3/20/2025  10:39 PM CDT  To: Heather Miller NP

## 2025-04-14 ENCOUNTER — RESULTS FOLLOW-UP (OUTPATIENT)
Dept: PALLIATIVE MEDICINE | Facility: CLINIC | Age: OVER 89
End: 2025-04-14

## 2025-04-14 ENCOUNTER — APPOINTMENT (OUTPATIENT)
Dept: RADIOLOGY | Facility: HOSPITAL | Age: OVER 89
End: 2025-04-14
Payer: MEDICARE

## 2025-04-14 DIAGNOSIS — M85.852 OSTEOPENIA OF LEFT HIP: ICD-10-CM

## 2025-04-14 PROCEDURE — 77080 DXA BONE DENSITY AXIAL: CPT | Mod: TC,HCNC

## 2025-04-14 PROCEDURE — 77080 DXA BONE DENSITY AXIAL: CPT | Mod: 26,HCNC,, | Performed by: RADIOLOGY

## 2025-04-21 ENCOUNTER — OFFICE VISIT (OUTPATIENT)
Dept: PALLIATIVE MEDICINE | Facility: CLINIC | Age: OVER 89
End: 2025-04-21
Payer: MEDICARE

## 2025-04-21 VITALS
DIASTOLIC BLOOD PRESSURE: 77 MMHG | HEIGHT: 64 IN | BODY MASS INDEX: 30.86 KG/M2 | SYSTOLIC BLOOD PRESSURE: 124 MMHG | WEIGHT: 180.75 LBS | HEART RATE: 111 BPM | OXYGEN SATURATION: 98 % | TEMPERATURE: 97 F

## 2025-04-21 DIAGNOSIS — L29.9 PRURITUS: Primary | ICD-10-CM

## 2025-04-21 DIAGNOSIS — I13.2 HYPERTENSIVE HEART AND RENAL DISEASE WITH BOTH (CONGESTIVE) HEART FAILURE AND RENAL FAILURE: Chronic | ICD-10-CM

## 2025-04-21 DIAGNOSIS — Z51.5 ENCOUNTER FOR PALLIATIVE CARE: ICD-10-CM

## 2025-04-21 DIAGNOSIS — Z79.01 CHRONIC ANTICOAGULATION: ICD-10-CM

## 2025-04-21 DIAGNOSIS — N19 HYPERTENSIVE HEART AND RENAL DISEASE WITH BOTH (CONGESTIVE) HEART FAILURE AND RENAL FAILURE: Chronic | ICD-10-CM

## 2025-04-21 DIAGNOSIS — M15.0 PRIMARY OSTEOARTHRITIS INVOLVING MULTIPLE JOINTS: Chronic | ICD-10-CM

## 2025-04-21 DIAGNOSIS — I48.91 ATRIAL FIBRILLATION WITH RVR: Chronic | ICD-10-CM

## 2025-04-21 PROCEDURE — 1160F RVW MEDS BY RX/DR IN RCRD: CPT | Mod: HCNC,CPTII,S$GLB, | Performed by: NURSE PRACTITIONER

## 2025-04-21 PROCEDURE — 99999 PR PBB SHADOW E&M-EST. PATIENT-LVL III: CPT | Mod: PBBFAC,HCNC,, | Performed by: NURSE PRACTITIONER

## 2025-04-21 PROCEDURE — 3288F FALL RISK ASSESSMENT DOCD: CPT | Mod: HCNC,CPTII,S$GLB, | Performed by: NURSE PRACTITIONER

## 2025-04-21 PROCEDURE — 1126F AMNT PAIN NOTED NONE PRSNT: CPT | Mod: HCNC,CPTII,S$GLB, | Performed by: NURSE PRACTITIONER

## 2025-04-21 PROCEDURE — 99215 OFFICE O/P EST HI 40 MIN: CPT | Mod: HCNC,S$GLB,, | Performed by: NURSE PRACTITIONER

## 2025-04-21 PROCEDURE — 1159F MED LIST DOCD IN RCRD: CPT | Mod: HCNC,CPTII,S$GLB, | Performed by: NURSE PRACTITIONER

## 2025-04-21 PROCEDURE — 1101F PT FALLS ASSESS-DOCD LE1/YR: CPT | Mod: HCNC,CPTII,S$GLB, | Performed by: NURSE PRACTITIONER

## 2025-04-21 RX ORDER — METOLAZONE 2.5 MG/1
2.5 TABLET ORAL
Qty: 12 TABLET | Refills: 3 | Status: ON HOLD | OUTPATIENT
Start: 2025-04-21 | End: 2025-04-26

## 2025-04-21 RX ORDER — FUROSEMIDE 40 MG/1
TABLET ORAL
Qty: 90 TABLET | Refills: 1 | Status: ON HOLD | OUTPATIENT
Start: 2025-04-21

## 2025-04-21 NOTE — PROGRESS NOTES
Palliative Medicine Clinic Note     Chief Complaint: No chief complaint on file.  F/U atrial fibrillation, HF, GOC.     ASSESSMENT/PLAN:      Plan/Recommendations:  1. Pruritus  -     Ambulatory referral/consult to Dermatology; Future; Expected date: 04/28/2025    2. 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  Dispense: 12 tablet; Refill: 3    3. Encounter for palliative care  Assessment & Plan:  Goals of care: Ms. Rg is hopeful to manage symptoms and remain independent as long as possible.   Advanced directive: Would not want any prolonged ventilator support. Would not want feeding tube placed for conditions from which she is not expected to recover. She has made her family aware of her wishes. Declined completion of living will and LaPOST  HC POA: Son Boris and daughter-in-law; they have copy of HC POA and know her HC wishes. Copy to chart  Symptoms: generalized weakness, pain to shoulders, wrists  Follow up: 3 months, sooner if needed.      4. Primary osteoarthritis involving multiple joints  Assessment & Plan:  Discussed possible causes, possible inflammatory arthritis and tx.   She declines further evaluation at this time.       5. Chronic anticoagulation  Assessment & Plan:  Continues DOAC for atrial fibrillation.   No sx of bleeding.   Followed by cardiology      6. Atrial fibrillation with RVR  Assessment & Plan:  Followed by cardiology  Tolerating Bystolic well but HR still 111-113.            Advance Care Planning   Advance Care Planning         Thank you for involving me in the care of this patient.     Follow up in about 3 months (around 7/21/2025).    Future Appointments   Date Time Provider Department Center   5/23/2025  1:00 PM  Phylicia Deleon MD [facility]   5/26/2025  1:00 PM Jo-Ann Jacobo PA-C [facility]   12/10/2025  9:45 AM Toi Kelly MD [facility]        SUBJECTIVE:      History of Present Illness / Interval History:  Shirley Metz is 89 y.o. 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 Presents to Palliative Care Clinic for physical symptoms, advance care planning and additional support. Please see cardiology for more details.         4/21/25  History obtained from: patient and medical record.    Difficult to control ventricular rate with metoprolol; cardiology changed to nebivolol.   Some increased pain hip/pelvis at LOV. Discussed possibly seeing orthopedist again.     Tear ducts re-blocked, eyes less dry, continues with dry mouth/eyes/lips.     BP typically 100//71. -113.  Taking midodrine TID, Bystolic.   Renal function unchanged.   Lab Results   Component Value Date    HGBA1C 6.9 (H) 09/23/2024     Feeling better, unable to lift arms, previously too painful, attributes pain relief to changing metoprolol to nebivolol. Continues home exercises.  Swelling to hands/wrists ongoing. Tolerating nebivolol well.     Pain neck/shoulders/feet is bad, difficulty getting around apt. Intermittent sx.     Today we discussed symptom management and advanced care planning.        ROS:  Review of Systems    Palliative Exam    Medications:  Current Medications[1]    External  database queried on 04/21/2025  by Heather CHU :     N/A    Review of patient's allergies indicates:   Allergen Reactions    Carvedilol Swelling     Lip swelling    Cephalexin Other (See Comments)     Muscle/joint weakness unable to move     Doxycycline Shortness Of Breath, Nausea And Vomiting and Other (See Comments)     weakness    Erythromycin Other (See  Comments)     Complete weakness/could not walk    Gadolinium-containing contrast media Swelling     angioedema    Hydrocodone-acetaminophen Shortness Of Breath     wheezing    Labetalol Shortness Of Breath, Nausea Only, Palpitations and Other (See Comments)     weakness    Loratadine Other (See Comments)     Double vision/spots  Other reaction(s): double vision    Losartan Other (See Comments)     weakness    Loteprednol etabonate Other (See Comments), Palpitations, Anxiety and Shortness Of Breath     Patient stated bp went up and extreme muscle weakness    Naproxen      wheezing  wheezing  wheezing  Other reaction(s): wheezing    Prednisone Other (See Comments)     Myalgias and generalized weakness, unable to walk    Statins-hmg-coa reductase inhibitors Other (See Comments)     Severe Muscle weakness  Muscle weakness  Severe Muscle weakness  Other reaction(s): severe muscle weakness    Amlodipine Other (See Comments)     Myalgias    Fenofibrate Other (See Comments)     muscle weakness      Hydralazine Other (See Comments)     muscle tremors    Loteprednol Other (See Comments)     increased BP    Macrolide antibiotics Other (See Comments)     Complete weakness/could not walk      Moxifloxacin Hives and Other (See Comments)     muscle soreness    Timolol Other (See Comments)     Blurred vision, Burning eyes, Severe muscle weakness, eye problems.      Verapamil Diarrhea     Severe diarrhea.      Hydrocortisone     Isosorbide      Tolerates Imdur    Silver sulfadiaz-foam bandage     Tetanus and diphtheria toxoids     Tetanus vaccines and toxoid     Adhesive Rash     Clonidine patch--contact dermatitis    Codeine Diarrhea and Other (See Comments)     Loss of balance       Doxazosin Palpitations    Fexofenadine Other (See Comments)     Double vision/spots       Hydrocortisone (bulk) Other (See Comments)     Only suppository/ caused muscle weakness    Latanoprost Other (See Comments)     Muscle weakness      Olopatadine  Other (See Comments)     Muscle weakness          OBJECTIVE:   Physical Exam:  Vitals: Temp: 97 °F (36.1 °C) (04/21/25 0949)  Pulse: (!) 111 (04/21/25 0949)  BP: 124/77 (04/21/25 0949)  SpO2: 98 % (04/21/25 0949)    Physical Exam    Wt Readings from Last 3 Encounters:   04/21/25 0949 82 kg (180 lb 12.4 oz)   03/20/25 1121 81.4 kg (179 lb 7.3 oz)   03/19/25 1057 81.9 kg (180 lb 8.9 oz)     BP Readings from Last 3 Encounters:   04/21/25 124/77   03/20/25 113/71   03/19/25 110/70       Labs:  Lab Results   Component Value Date    WBC 9.52 03/20/2025    HGB 13.5 03/20/2025    HCT 42.2 03/20/2025     (H) 03/20/2025     03/20/2025           Imaging:       I spent a total of 50 minutes on the day of the visit. This includes face to face time in discussion of goals of care, symptom assessment, coordination of care and emotional support.  This also includes non-face to face time preparing to see the patient (eg, review of tests/imaging), obtaining and/or reviewing separately obtained history, documenting clinical information in the electronic or other health record, independently interpreting results and communicating results to the patient/family/caregiver, or care coordinator.     Additional 10 min time spent on a voluntary advance care planning and /or goals of care discussion, providing emotional support, formulating and communicating prognosis and exploring burden/benefit of various approaches of treatment.       Heather Miller NP         [1]   Current Outpatient Medications:     acetaminophen (TYLENOL) 500 MG tablet, Take 500 mg by mouth nightly as needed., Disp: , Rfl:     ALPRAZolam (XANAX) 0.5 MG tablet, Take 1 tablet (0.5 mg total) by mouth nightly as needed for Anxiety., Disp: 30 tablet, Rfl: 2    b complex vitamins capsule, Take 1 capsule by mouth once daily., Disp: , Rfl:     calcium carbonate/vitamin D3 (VITAMIN D-3 ORAL), Take 4,000 Int'l Units/day by mouth once daily., Disp: , Rfl:     ELIQUIS  2.5 mg Tab, TAKE 1 TABLET (2.5 MG TOTAL) BY MOUTH 2 (TWO) TIMES DAILY., Disp: 180 tablet, Rfl: 1    latanoprostene bunod (VYZULTA) 0.024 % Drop, Place 1 drop into both eyes every evening., Disp: 2.5 mL, Rfl: 12    midodrine (PROAMATINE) 2.5 MG Tab, Take 1 tablet (2.5 mg total) by mouth 3 (three) times daily., Disp: 90 tablet, Rfl: 11    nebivoloL (BYSTOLIC) 5 MG Tab, Take 1 tablet (5 mg total) by mouth once daily., Disp: 30 tablet, Rfl: 11    furosemide (LASIX) 40 MG tablet, Take 1 tab in am and 1/2 tab in evening, Disp: 90 tablet, Rfl: 1    metOLazone (ZAROXOLYN) 2.5 MG tablet, Take 1 tablet (2.5 mg total) by mouth as needed (aprx once weekly)., Disp: 12 tablet, Rfl: 3

## 2025-04-21 NOTE — ASSESSMENT & PLAN NOTE
Goals of care: Ms. Rg is hopeful to manage symptoms and remain independent as long as possible.   Advanced directive: Would not want any prolonged ventilator support. Would not want feeding tube placed for conditions from which she is not expected to recover. She has made her family aware of her wishes. Declined completion of living will and [place]POST  HC POA: Son Boris and daughter-in-law; they have copy of HC POA and know her HC wishes. Copy to chart  Symptoms: generalized weakness, pain to shoulders, wrists  Follow up: 3 months, sooner if needed.

## 2025-04-21 NOTE — ASSESSMENT & PLAN NOTE
Discussed possible causes, possible inflammatory arthritis and tx.   She declines further evaluation at this time.

## 2025-04-26 ENCOUNTER — HOSPITAL ENCOUNTER (INPATIENT)
Facility: HOSPITAL | Age: OVER 89
LOS: 6 days | Discharge: NURSING, REHAB OR LONG-TERM CARE | DRG: 242 | End: 2025-05-02
Attending: FAMILY MEDICINE | Admitting: STUDENT IN AN ORGANIZED HEALTH CARE EDUCATION/TRAINING PROGRAM
Payer: MEDICARE

## 2025-04-26 DIAGNOSIS — R42 DIZZINESS: ICD-10-CM

## 2025-04-26 DIAGNOSIS — F41.9 ANXIETY: Chronic | ICD-10-CM

## 2025-04-26 DIAGNOSIS — R00.1 SYMPTOMATIC SINUS BRADYCARDIA: ICD-10-CM

## 2025-04-26 DIAGNOSIS — Z13.6 SCREENING FOR CARDIOVASCULAR CONDITION: ICD-10-CM

## 2025-04-26 DIAGNOSIS — R00.1 BRADYCARDIA: Primary | ICD-10-CM

## 2025-04-26 DIAGNOSIS — R07.9 CHEST PAIN: ICD-10-CM

## 2025-04-26 DIAGNOSIS — T82.110A PACEMAKER LEAD FAILURE, INITIAL ENCOUNTER: ICD-10-CM

## 2025-04-26 DIAGNOSIS — I49.5 SICK SINUS SYNDROME: ICD-10-CM

## 2025-04-26 DIAGNOSIS — I10 PRIMARY HYPERTENSION: Chronic | ICD-10-CM

## 2025-04-26 LAB
ABSOLUTE EOSINOPHIL (OHS): 0.21 K/UL
ABSOLUTE MONOCYTE (OHS): 0.71 K/UL (ref 0.3–1)
ABSOLUTE NEUTROPHIL COUNT (OHS): 4.77 K/UL (ref 1.8–7.7)
ALBUMIN SERPL BCP-MCNC: 3.9 G/DL (ref 3.5–5.2)
ALP SERPL-CCNC: 58 UNIT/L (ref 40–150)
ALT SERPL W/O P-5'-P-CCNC: 17 UNIT/L (ref 10–44)
ANION GAP (OHS): 11 MMOL/L (ref 8–16)
AST SERPL-CCNC: 22 UNIT/L (ref 11–45)
BACTERIA #/AREA URNS AUTO: ABNORMAL /HPF
BASOPHILS # BLD AUTO: 0.04 K/UL
BASOPHILS NFR BLD AUTO: 0.5 %
BILIRUB SERPL-MCNC: 0.6 MG/DL (ref 0.1–1)
BILIRUB UR QL STRIP.AUTO: NEGATIVE
BNP SERPL-MCNC: 188 PG/ML (ref 0–99)
BUN SERPL-MCNC: 47 MG/DL (ref 10–30)
CALCIUM SERPL-MCNC: 9.5 MG/DL (ref 8.7–10.5)
CHLORIDE SERPL-SCNC: 102 MMOL/L (ref 95–110)
CLARITY UR: CLEAR
CO2 SERPL-SCNC: 28 MMOL/L (ref 23–29)
COLOR UR AUTO: YELLOW
CREAT SERPL-MCNC: 1.8 MG/DL (ref 0.5–1.4)
ERYTHROCYTE [DISTWIDTH] IN BLOOD BY AUTOMATED COUNT: 13.2 % (ref 11.5–14.5)
GFR SERPLBLD CREATININE-BSD FMLA CKD-EPI: 26 ML/MIN/1.73/M2
GLUCOSE SERPL-MCNC: 183 MG/DL (ref 70–110)
GLUCOSE UR QL STRIP: NEGATIVE
HCT VFR BLD AUTO: 38.1 % (ref 37–48.5)
HGB BLD-MCNC: 12.5 GM/DL (ref 12–16)
HGB UR QL STRIP: NEGATIVE
HOLD SPECIMEN: NORMAL
HYALINE CASTS UR QL AUTO: 1 /LPF (ref 0–1)
IMM GRANULOCYTES # BLD AUTO: 0.02 K/UL (ref 0–0.04)
IMM GRANULOCYTES NFR BLD AUTO: 0.3 % (ref 0–0.5)
KETONES UR QL STRIP: NEGATIVE
LACTATE SERPL-SCNC: 1.4 MMOL/L (ref 0.5–2.2)
LACTATE SERPL-SCNC: 1.9 MMOL/L (ref 0.5–2.2)
LEUKOCYTE ESTERASE UR QL STRIP: ABNORMAL
LYMPHOCYTES # BLD AUTO: 1.69 K/UL (ref 1–4.8)
MCH RBC QN AUTO: 33.2 PG (ref 27–31)
MCHC RBC AUTO-ENTMCNC: 32.8 G/DL (ref 32–36)
MCV RBC AUTO: 101 FL (ref 82–98)
MICROSCOPIC COMMENT: ABNORMAL
NITRITE UR QL STRIP: NEGATIVE
NUCLEATED RBC (/100WBC) (OHS): 0 /100 WBC
PH UR STRIP: 6 [PH]
PLATELET # BLD AUTO: 220 K/UL (ref 150–450)
PMV BLD AUTO: 9.4 FL (ref 9.2–12.9)
POCT GLUCOSE: 146 MG/DL (ref 70–110)
POTASSIUM SERPL-SCNC: 3.4 MMOL/L (ref 3.5–5.1)
PROCALCITONIN SERPL-MCNC: 0.08 NG/ML
PROT SERPL-MCNC: 7.8 GM/DL (ref 6–8.4)
PROT UR QL STRIP: NEGATIVE
RBC # BLD AUTO: 3.76 M/UL (ref 4–5.4)
RBC #/AREA URNS AUTO: 3 /HPF (ref 0–4)
RELATIVE EOSINOPHIL (OHS): 2.8 %
RELATIVE LYMPHOCYTE (OHS): 22.7 % (ref 18–48)
RELATIVE MONOCYTE (OHS): 9.5 % (ref 4–15)
RELATIVE NEUTROPHIL (OHS): 64.2 % (ref 38–73)
SODIUM SERPL-SCNC: 141 MMOL/L (ref 136–145)
SP GR UR STRIP: 1.01
SQUAMOUS #/AREA URNS AUTO: 2 /HPF
TROPONIN I SERPL DL<=0.01 NG/ML-MCNC: 0.05 NG/ML
UROBILINOGEN UR STRIP-ACNC: NEGATIVE EU/DL
WBC # BLD AUTO: 7.44 K/UL (ref 3.9–12.7)
WBC #/AREA URNS AUTO: 8 /HPF (ref 0–5)

## 2025-04-26 PROCEDURE — 87040 BLOOD CULTURE FOR BACTERIA: CPT | Mod: HCNC | Performed by: STUDENT IN AN ORGANIZED HEALTH CARE EDUCATION/TRAINING PROGRAM

## 2025-04-26 PROCEDURE — 83605 ASSAY OF LACTIC ACID: CPT | Mod: HCNC | Performed by: STUDENT IN AN ORGANIZED HEALTH CARE EDUCATION/TRAINING PROGRAM

## 2025-04-26 PROCEDURE — 25000003 PHARM REV CODE 250: Mod: HCNC | Performed by: STUDENT IN AN ORGANIZED HEALTH CARE EDUCATION/TRAINING PROGRAM

## 2025-04-26 PROCEDURE — 80053 COMPREHEN METABOLIC PANEL: CPT | Mod: HCNC | Performed by: FAMILY MEDICINE

## 2025-04-26 PROCEDURE — 84484 ASSAY OF TROPONIN QUANT: CPT | Mod: HCNC | Performed by: FAMILY MEDICINE

## 2025-04-26 PROCEDURE — 93005 ELECTROCARDIOGRAM TRACING: CPT | Mod: HCNC

## 2025-04-26 PROCEDURE — 84145 PROCALCITONIN (PCT): CPT | Mod: HCNC | Performed by: FAMILY MEDICINE

## 2025-04-26 PROCEDURE — 63600175 PHARM REV CODE 636 W HCPCS: Mod: HCNC | Performed by: STUDENT IN AN ORGANIZED HEALTH CARE EDUCATION/TRAINING PROGRAM

## 2025-04-26 PROCEDURE — 36415 COLL VENOUS BLD VENIPUNCTURE: CPT | Mod: HCNC | Performed by: STUDENT IN AN ORGANIZED HEALTH CARE EDUCATION/TRAINING PROGRAM

## 2025-04-26 PROCEDURE — 83605 ASSAY OF LACTIC ACID: CPT | Mod: HCNC | Performed by: FAMILY MEDICINE

## 2025-04-26 PROCEDURE — 83880 ASSAY OF NATRIURETIC PEPTIDE: CPT | Mod: HCNC | Performed by: FAMILY MEDICINE

## 2025-04-26 PROCEDURE — 99285 EMERGENCY DEPT VISIT HI MDM: CPT | Mod: 25,HCNC

## 2025-04-26 PROCEDURE — 81003 URINALYSIS AUTO W/O SCOPE: CPT | Mod: HCNC | Performed by: FAMILY MEDICINE

## 2025-04-26 PROCEDURE — 85025 COMPLETE CBC W/AUTO DIFF WBC: CPT | Mod: HCNC | Performed by: FAMILY MEDICINE

## 2025-04-26 PROCEDURE — 93010 ELECTROCARDIOGRAM REPORT: CPT | Mod: HCNC,,, | Performed by: INTERNAL MEDICINE

## 2025-04-26 PROCEDURE — 11000001 HC ACUTE MED/SURG PRIVATE ROOM: Mod: HCNC

## 2025-04-26 RX ORDER — IBUPROFEN 200 MG
24 TABLET ORAL
Status: DISCONTINUED | OUTPATIENT
Start: 2025-04-26 | End: 2025-05-02 | Stop reason: HOSPADM

## 2025-04-26 RX ORDER — ACETAMINOPHEN 325 MG/1
650 TABLET ORAL EVERY 6 HOURS PRN
Status: DISCONTINUED | OUTPATIENT
Start: 2025-04-26 | End: 2025-05-02 | Stop reason: HOSPADM

## 2025-04-26 RX ORDER — PROCHLORPERAZINE EDISYLATE 5 MG/ML
5 INJECTION INTRAMUSCULAR; INTRAVENOUS EVERY 6 HOURS PRN
Status: DISCONTINUED | OUTPATIENT
Start: 2025-04-26 | End: 2025-05-01

## 2025-04-26 RX ORDER — HEPARIN SODIUM 5000 [USP'U]/ML
5000 INJECTION, SOLUTION INTRAVENOUS; SUBCUTANEOUS EVERY 8 HOURS
Status: DISCONTINUED | OUTPATIENT
Start: 2025-04-26 | End: 2025-04-27

## 2025-04-26 RX ORDER — SODIUM CHLORIDE 0.9 % (FLUSH) 0.9 %
10 SYRINGE (ML) INJECTION EVERY 12 HOURS PRN
Status: DISCONTINUED | OUTPATIENT
Start: 2025-04-26 | End: 2025-05-02 | Stop reason: HOSPADM

## 2025-04-26 RX ORDER — FUROSEMIDE 20 MG/1
20 TABLET ORAL NIGHTLY
Status: DISCONTINUED | OUTPATIENT
Start: 2025-04-26 | End: 2025-04-27

## 2025-04-26 RX ORDER — TALC
6 POWDER (GRAM) TOPICAL NIGHTLY PRN
Status: DISCONTINUED | OUTPATIENT
Start: 2025-04-26 | End: 2025-04-28

## 2025-04-26 RX ORDER — IBUPROFEN 200 MG
16 TABLET ORAL
Status: DISCONTINUED | OUTPATIENT
Start: 2025-04-26 | End: 2025-05-02 | Stop reason: HOSPADM

## 2025-04-26 RX ORDER — SODIUM CHLORIDE, SODIUM LACTATE, POTASSIUM CHLORIDE, CALCIUM CHLORIDE 600; 310; 30; 20 MG/100ML; MG/100ML; MG/100ML; MG/100ML
INJECTION, SOLUTION INTRAVENOUS CONTINUOUS
Status: ACTIVE | OUTPATIENT
Start: 2025-04-26 | End: 2025-04-26

## 2025-04-26 RX ORDER — GLUCAGON 1 MG
1 KIT INJECTION
Status: DISCONTINUED | OUTPATIENT
Start: 2025-04-26 | End: 2025-05-02 | Stop reason: HOSPADM

## 2025-04-26 RX ORDER — ALPRAZOLAM 0.5 MG/1
0.5 TABLET ORAL NIGHTLY PRN
Status: DISCONTINUED | OUTPATIENT
Start: 2025-04-26 | End: 2025-05-02 | Stop reason: HOSPADM

## 2025-04-26 RX ORDER — NALOXONE HCL 0.4 MG/ML
0.02 VIAL (ML) INJECTION
Status: DISCONTINUED | OUTPATIENT
Start: 2025-04-26 | End: 2025-05-02 | Stop reason: HOSPADM

## 2025-04-26 RX ORDER — BISACODYL 10 MG/1
10 SUPPOSITORY RECTAL DAILY PRN
Status: DISCONTINUED | OUTPATIENT
Start: 2025-04-26 | End: 2025-05-02 | Stop reason: HOSPADM

## 2025-04-26 RX ORDER — POLYETHYLENE GLYCOL 3350 17 G/17G
17 POWDER, FOR SOLUTION ORAL DAILY PRN
Status: DISCONTINUED | OUTPATIENT
Start: 2025-04-26 | End: 2025-05-02 | Stop reason: HOSPADM

## 2025-04-26 RX ORDER — FUROSEMIDE 40 MG/1
40 TABLET ORAL DAILY
Status: DISCONTINUED | OUTPATIENT
Start: 2025-04-27 | End: 2025-04-27

## 2025-04-26 RX ADMIN — HEPARIN SODIUM 5000 UNITS: 5000 INJECTION INTRAVENOUS; SUBCUTANEOUS at 10:04

## 2025-04-26 RX ADMIN — FUROSEMIDE 20 MG: 20 TABLET ORAL at 10:04

## 2025-04-26 RX ADMIN — ALPRAZOLAM 0.5 MG: 0.5 TABLET ORAL at 10:04

## 2025-04-26 RX ADMIN — SODIUM CHLORIDE, POTASSIUM CHLORIDE, SODIUM LACTATE AND CALCIUM CHLORIDE: 600; 310; 30; 20 INJECTION, SOLUTION INTRAVENOUS at 06:04

## 2025-04-26 NOTE — ASSESSMENT & PLAN NOTE
-admit to inpatient  -monitor on tele  -hold darryl blocking agents, hold Eliquis, check 2D echo  -cardiology consulted recs appreciated  -possible pacemaker placement.

## 2025-04-26 NOTE — Clinical Note
Patient Education       Well Child Exam 11 to 14 Years   About this topic   Your child's well child exam is a visit with the doctor to check your child's health. The doctor measures your child's weight and height, and may measure your child's body mass index (BMI). The doctor plots these numbers on a growth curve. The growth curve gives a picture of your child's growth at each visit. The doctor may listen to your child's heart, lungs, and belly. Your doctor will do a full exam of your child from the head to the toes.  Your child may also need shots or blood tests during this visit.  General   Growth and Development   Your doctor will ask you how your child is developing. The doctor will focus on the skills that most children your child's age are expected to do. During this time of your child's life, here are some things you can expect.  · Physical development ? Your child may:  ? Show signs of maturing physically  ? Need reminders about drinking water when playing  ? Be a little clumsy while growing  · Hearing, seeing, and talking ? Your child may:  ? Be able to see the long-term effects of actions  ? Understand many viewpoints  ? Begin to question and challenge existing rules  ? Want to help set household rules  · Feelings and behavior ? Your child may:  ? Want to spend time alone or with friends rather than with family  ? Have an interest in dating and the opposite sex  ? Value the opinions of friends over parents' thoughts or ideas  ? Want to push the limits of what is allowed  ? Believe bad things wont happen to them  · Feeding ? Your child needs:  ? To learn to make healthy choices when eating. Serve healthy foods like lean meats, fruits, vegetables, and whole grains. Help your child choose healthy foods when out to eat.  ? To start each day with a healthy breakfast  ? To limit soda, chips, candy, and foods that are high in fats and sugar  ? Healthy snacks available like fruit, cheese and crackers, or peanut  The patient's elbows and knees were padded, heels floated, warming blanket given, and safety strap applied. butter  ? To eat meals as a part of the family. Turn the TV and cell phones off while eating. Talk about your day, rather than focusing on what your child is eating.  · Sleep ? Your child:  ? Needs more sleep  ? Is likely sleeping about 8 to 10 hours in a row at night  ? Should be allowed to read each night before bed. Have your child brush and floss the teeth before going to bed as well.  ? Should limit TV and computers for the hour before bedtime  ? Keep cell phones, tablets, televisions, and other electronic devices out of bedrooms overnight. They interfere with sleep.  ? Needs a routine to make week nights easier. Encourage your child to get up at a normal time on weekends instead of sleeping late.  · Shots or vaccines ? It is important for your child to get shots on time. This protects your child from very serious illnesses like pneumonia, blood and brain infections, tetanus, flu, or cancer. Your child may need:  ? HPV or human papillomavirus vaccine  ? Tdap or tetanus, diphtheria, and pertussis vaccine  ? Meningococcal vaccine  ? Influenza vaccine  Help for Parents   · Activities.  ? Encourage your child to spend at least 1 hour each day being physically active.  ? Offer your child a variety of activities to take part in. Include music, sports, arts and crafts, and other things your child is interested in. Take care not to over schedule your child. One to 2 activities a week outside of school is often a good number for your child.  ? Make sure your child wears a helmet when using anything with wheels like skates, skateboard, bike, etc.  ? Encourage time spent with friends. Provide a safe area for this.  · Here are some things you can do to help keep your child safe and healthy.  ? Talk to your child about the dangers of smoking, drinking alcohol, and using drugs. Do not allow anyone to smoke in your home or around your child.  ? Make sure your child uses a seat belt when riding in the car. Your child should  ride in the back seat until 13 years of age.  ? Talk with your child about peer pressure. Help your child learn how to handle risky things friends may want to do.  ? Remind your child to use headphones responsibly. Limit how loud the volume is turned up. Never wear headphones, text, or use a cell phone while riding a bike or crossing the street.  ? Protect your child from gun injuries. If you have a gun, use a trigger lock. Keep the gun locked up and the bullets kept in a separate place.  ? Limit screen time for children to 1 to 2 hours per day. This includes TV, phones, computers, and video games.  ? Discuss social media safety  · Parents need to think about:  ? Monitoring your child's computer use, especially when on the Internet  ? How to keep open lines of communication about unwanted touch, sex, and dating  ? How to continue to talk about puberty  ? Having your child help with some family chores to encourage responsibility within the family  ? Helping children make healthy choices  · The next well child visit will most likely be in 1 year. At this visit, your doctor may:  ? Do a full check up on your child  ? Talk about school, friends, and social skills  ? Talk about sexuality and sexually-transmitted diseases  ? Talk about driving and safety  When do I need to call the doctor?   · Fever of 100.4°F (38°C) or higher  · Your child has not started puberty by age 14  · Low mood, suddenly getting poor grades, or missing school  · You are worried about your child's development  Where can I learn more?   Centers for Disease Control and Prevention  https://www.cdc.gov/ncbddd/childdevelopment/positiveparenting/adolescence.html   Centers for Disease Control and Prevention  https://www.cdc.gov/vaccines/parents/diseases/teen/index.html   KidsHealth  http://kidshealth.org/parent/growth/medical/checkup_11yrs.html#ntn159   KidsHealth  http://kidshealth.org/parent/growth/medical/checkup_12yrs.html#knj152    KidsHealth  http://kidshealth.org/parent/growth/medical/checkup_13yrs.html#tec856   KidsHealth  http://kidshealth.org/parent/growth/medical/checkup_14yrs.html#   Last Reviewed Date   2019-10-14  Consumer Information Use and Disclaimer   This information is not specific medical advice and does not replace information you receive from your health care provider. This is only a brief summary of general information. It does NOT include all information about conditions, illnesses, injuries, tests, procedures, treatments, therapies, discharge instructions or life-style choices that may apply to you. You must talk with your health care provider for complete information about your health and treatment options. This information should not be used to decide whether or not to accept your health care providers advice, instructions or recommendations. Only your health care provider has the knowledge and training to provide advice that is right for you.  Copyright   Copyright © 2021 [employer], [employer]. and its affiliates and/or licensors. All rights reserved.    At 9 years old, children who have outgrown the booster seat may use the adult safety belt fastened correctly.   If you have an active [facility] account, please look for your well child questionnaire to come to your [facility] account before your next well child visit.

## 2025-04-26 NOTE — ASSESSMENT & PLAN NOTE
Patient has Diastolic (HFpEF) heart failure that is Chronic. On presentation their CHF was well compensated. Most recent BNP and echo results are listed below.  Recent Labs     04/26/25  1352   *     Latest ECHO  Results for orders placed during the hospital encounter of 07/11/22    Echo    Interpretation Summary  · Concentric remodeling and normal systolic function.  · The estimated ejection fraction is 60%.  · Normal left ventricular diastolic function.  · Normal right ventricular size with normal right ventricular systolic function.    Current Heart Failure Medications  furosemide tablet 40 mg, Daily, Oral  furosemide tablet 20 mg, Nightly, Oral    Plan  - Monitor strict I&Os and daily weights.    - Place on telemetry  - Low sodium diet  - Place on fluid restriction of 1.5 L.   - Cardiology has been consulted  - The patient's volume status is at their baseline  - continue home Lasix regimen.  Due to issues with blood pressure, has not tolerated metoprolol or Ace/Arb.  Currently holding Bystolic due to bradycardia.

## 2025-04-26 NOTE — Clinical Note
Patient complain of BLE pain- pulses are (1+); skin is clean, warm to touch without signs of redness or edema; cap refill < 3 seconds

## 2025-04-26 NOTE — ASSESSMENT & PLAN NOTE
Patient has persistent (7 days or more) atrial fibrillation. Patient is currently in sinus rhythm. [id number] Score: 5. The patients heart rate in the last 24 hours is as follows:  Pulse  Min: 19  Max: 113     Antiarrhythmics       Anticoagulants  heparin (porcine) injection 5,000 Units, Every 8 hours, Subcutaneous    Plan  - holding Eliquis for possible pacemaker placement  -holding all darryl blocking agents due to bradycardia

## 2025-04-26 NOTE — HPI
89-year-old  female with history of persistent AFib on Eliquis, chronic diastolic CHF, glaucoma, morbid obesity, hypertension presents from assisted living with complaints of dizziness.  States she was in her normal state of health when she suddenly felt dizzy, lightheaded, blurry vision felt faint.  This was persistent prompting ER visit.  In the ER was noted to have heart rate between 101 10, and bouts of bradycardia into the 30s with brief pauses.  Had intermittent PACs/PVCs.  She states her heart rate typically runs 100- 110 Has had several ablations by Dr. Kelly in the past.  Was on metoprolol but stopped 6 weeks ago due to issues with blood pressure running low.  She has also been on midodrine as needed.  Denies any chest pain, lower extremity edema, nausea, vomiting fevers, chills, cough congestion, diarrhea.  She states her    Labs notable for potassium 3.4, troponin 0.052, chest x-ray without any acute findings.  EKG her, heart rate 100, intermittent PVCs, no ischemic ST changes noted.

## 2025-04-26 NOTE — SUBJECTIVE & OBJECTIVE
Past Medical History:   Diagnosis Date    Acute on chronic diastolic congestive heart failure 01/11/2023    Anemia 11/29/2018    Anxiety 11/28/2017    Arthritis     Atrial fibrillation with RVR 10/09/2019    Back pain     Basal cell carcinoma 03/29/2018    left mid back    Chronic diastolic congestive heart failure 10/15/2019    CKD (chronic kidney disease) stage 3, GFR 30-59 ml/min 08/08/2016    Colon polyps     reported per patient.     Coronary artery calcification 10/05/2021    Elevated liver enzymes 12/20/2019    Elevated troponin 03/15/2018    Fuchs' corneal dystrophy     General anesthetics causing adverse effect in therapeutic use     woke up during surgery and gagged on ET tube    Glaucoma     Glaucoma     Heart failure with preserved left ventricular function (HFpEF) 07/24/2018    Hyperlipidemia     Hypertension     Macular degeneration dry    Medication side effects 05/03/2017    Obesity     Pre-diabetes 12/04/2017    PVD (peripheral vascular disease) 04/26/2021    PVD (peripheral vascular disease) 04/26/2021    Squamous cell carcinoma 01/08/2019    left shoulder    Stenosis of carotid artery 10/05/2021    Troponin level elevated 01/11/2023    Trouble in sleeping     Type 2 diabetes mellitus without complication, without long-term current use of insulin 02/24/2021    Urinary tract infection        Past Surgical History:   Procedure Laterality Date    ABLATION OF ARRHYTHMOGENIC FOCUS FOR ATRIAL FIBRILLATION N/A 11/03/2022    Procedure: Ablation atrial fibrillation;  Surgeon: Toi Kelly MD;  Location: [facility];  Service: Cardiology;  Laterality: N/A;  afib, PVI/CTI, RFA, STACEY (cx if SR), DEDE, anes, MB, 3 Prep    ADENOIDECTOMY  1938    BREAST BIOPSY Left     1997. benign    BREAST CYST EXCISION Left 1997 approx    CARPAL TUNNEL RELEASE Right 2012 approx    CATARACT EXTRACTION Bilateral 1994    CHOLECYSTECTOMY  1975    CORNEAL TRANSPLANT Bilateral 08/2015 8/2015 - left; Right 4/2016    EYE  SURGERY  12/06/2023    Fuch's Corneal dystrophy - had 2nd operation with Dr. Quintanilla    EYE SURGERY      Cataract wIOL    left thumb Left 2011    removed cuboid for arthritis    REVERSE TOTAL SHOULDER ARTHROPLASTY Left 08/21/2018    Procedure: ARTHROPLASTY, SHOULDER, TOTAL, REVERSE;  Surgeon: Solomon Lujan MD;  Location: [facility] OR;  Service: Orthopedics;  Laterality: Left;  WITH BICEP TENODESIS    SKIN CANCER EXCISION Left 2017    BCC removal by Dr. Valadez    SLT- OS (aka TRABECULOPLASTY) Left 05/11/2011    repeat 3/14/12    TENOTOMY Left 08/21/2018    Procedure: TENOTOMY;  Surgeon: Solomon Lujan MD;  Location: [facility] OR;  Service: Orthopedics;  Laterality: Left;    TONSILLECTOMY  1938    TRANSESOPHAGEAL ECHOCARDIOGRAM WITH POSSIBLE CARDIOVERSION (STACEY W/ POSS CARDIOVERSION) N/A 03/21/2023    Procedure: Transesophageal echo (STACEY) intra-procedure log documentation;  Surgeon: Trevon Ramirez MD;  Location: [facility] CATH LAB;  Service: Cardiology;  Laterality: N/A;    TREATMENT OF CARDIAC ARRHYTHMIA N/A 10/10/2019    Procedure: CARDIOVERSION;  Surgeon: Pete Durán MD;  Location: [facility] CATH LAB;  Service: Cardiology;  Laterality: N/A;    TREATMENT OF CARDIAC ARRHYTHMIA N/A 12/06/2019    Procedure: CARDIOVERSION;  Surgeon: Samy Castillo MD;  Location: [facility] CATH LAB;  Service: Cardiology;  Laterality: N/A;       Review of patient's allergies indicates:   Allergen Reactions    Carvedilol Swelling     Lip swelling    Cephalexin Other (See Comments)     Muscle/joint weakness unable to move     Doxycycline Shortness Of Breath, Nausea And Vomiting and Other (See Comments)     weakness    Erythromycin Other (See Comments)     Complete weakness/could not walk    Gadolinium-containing contrast media Swelling     angioedema    Hydrocodone-acetaminophen Shortness Of Breath     wheezing    Labetalol Shortness Of Breath, Nausea Only, Palpitations and Other (See Comments)     weakness    Loratadine Other (See Comments)     Double  vision/spots  Other reaction(s): double vision    Losartan Other (See Comments)     weakness    Loteprednol etabonate Other (See Comments), Palpitations, Anxiety and Shortness Of Breath     Patient stated bp went up and extreme muscle weakness    Naproxen      wheezing  wheezing  wheezing  Other reaction(s): wheezing    Prednisone Other (See Comments)     Myalgias and generalized weakness, unable to walk    Statins-hmg-coa reductase inhibitors Other (See Comments)     Severe Muscle weakness  Muscle weakness  Severe Muscle weakness  Other reaction(s): severe muscle weakness    Amlodipine Other (See Comments)     Myalgias    Fenofibrate Other (See Comments)     muscle weakness      Hydralazine Other (See Comments)     muscle tremors    Loteprednol Other (See Comments)     increased BP    Macrolide antibiotics Other (See Comments)     Complete weakness/could not walk      Moxifloxacin Hives and Other (See Comments)     muscle soreness    Timolol Other (See Comments)     Blurred vision, Burning eyes, Severe muscle weakness, eye problems.      Verapamil Diarrhea     Severe diarrhea.      Hydrocortisone     Isosorbide      Tolerates Imdur    Silver sulfadiaz-foam bandage     Tetanus and diphtheria toxoids     Tetanus vaccines and toxoid     Adhesive Rash     Clonidine patch--contact dermatitis    Codeine Diarrhea and Other (See Comments)     Loss of balance       Doxazosin Palpitations    Fexofenadine Other (See Comments)     Double vision/spots       Hydrocortisone (bulk) Other (See Comments)     Only suppository/ caused muscle weakness    Latanoprost Other (See Comments)     Muscle weakness      Olopatadine Other (See Comments)     Muscle weakness         No current facility-administered medications on file prior to encounter.     Current Outpatient Medications on File Prior to Encounter   Medication Sig    acetaminophen (TYLENOL) 500 MG tablet Take 500 mg by mouth nightly as needed.    ALPRAZolam (XANAX) 0.5 MG tablet  Take 1 tablet (0.5 mg total) by mouth nightly as needed for Anxiety.    b complex vitamins capsule Take 1 capsule by mouth once daily.    calcium carbonate/vitamin D3 (VITAMIN D-3 ORAL) Take 4,000 Int'l Units/day by mouth once daily.    ELIQUIS 2.5 mg Tab TAKE 1 TABLET (2.5 MG TOTAL) BY MOUTH 2 (TWO) TIMES DAILY.    furosemide (LASIX) 40 MG tablet Take 1 tab in am and 1/2 tab in evening    latanoprostene bunod (VYZULTA) 0.024 % Drop Place 1 drop into both eyes every evening.    metOLazone (ZAROXOLYN) 2.5 MG tablet Take 1 tablet (2.5 mg total) by mouth as needed (aprx once weekly).    midodrine (PROAMATINE) 2.5 MG Tab Take 1 tablet (2.5 mg total) by mouth 3 (three) times daily.    nebivoloL (BYSTOLIC) 5 MG Tab Take 1 tablet (5 mg total) by mouth once daily.    [DISCONTINUED] cloNIDine (CATAPRES) 0.1 MG tablet Take 1 tablet (0.1 mg total) by mouth 2 (two) times daily as needed (systolic BP over 180).    [DISCONTINUED] isosorbide mononitrate (IMDUR) 30 MG 24 hr tablet Take 1 tablet (30 mg total) by mouth every evening.     Family History       Problem Relation (Age of Onset)    Cancer Father (88)    Heart disease Mother    Hypertension Mother          Tobacco Use    Smoking status: Never    Smokeless tobacco: Never    Tobacco comments:     history of passive smoking from her ex-   Substance and Sexual Activity    Alcohol use: Yes     Alcohol/week: 2.0 standard drinks of alcohol     Types: 2 Standard drinks or equivalent per week     Comment: occ    Drug use: No    Sexual activity: Not Currently     Partners: Male     Birth control/protection: Post-menopausal     Comment: discussed protection for STD's     Review of Systems   All other systems reviewed and are negative.    Objective:     Vital Signs (Most Recent):  Temp: 97.1 °F (36.2 °C) (04/26/25 1603)  Pulse: (!) 19 (3.178 r-r pauses) (04/26/25 1708)  Resp: 16 (04/26/25 1603)  BP: 130/74 (04/26/25 1603)  SpO2: 95 % (04/26/25 1603) Vital Signs (24h  Range):  Temp:  [97.1 °F (36.2 °C)-98.2 °F (36.8 °C)] 97.1 °F (36.2 °C)  Pulse:  [] 19  Resp:  [15-20] 16  SpO2:  [95 %-99 %] 95 %  BP: (130-155)/(60-75) 130/74     Weight: 83.5 kg (184 lb 1.4 oz)  Body mass index is 31.6 kg/m².     Physical Exam  Constitutional:       General: She is not in acute distress.  Eyes:      Extraocular Movements: Extraocular movements intact.      Pupils: Pupils are equal, round, and reactive to light.   Cardiovascular:      Rate and Rhythm: Normal rate.      Heart sounds: No murmur heard.     Comments: Intermittent tachycardia to 105  Pulmonary:      Effort: Pulmonary effort is normal. No respiratory distress.      Breath sounds: No wheezing.   Abdominal:      General: There is no distension.      Tenderness: There is no abdominal tenderness.      Comments: Obese abdomen nontender   Musculoskeletal:         General: No swelling or tenderness.   Skin:     General: Skin is warm and dry.   Neurological:      General: No focal deficit present.      Mental Status: She is alert and oriented to person, place, and time.      Cranial Nerves: No cranial nerve deficit.   Psychiatric:         Mood and Affect: Mood normal.         Behavior: Behavior normal.              CRANIAL NERVES     CN III, IV, VI   Pupils are equal, round, and reactive to light.       Significant Labs: All pertinent labs within the past 24 hours have been reviewed.  CBC:   Recent Labs   Lab 04/26/25  1352   WBC 7.44   HGB 12.5   HCT 38.1        CMP:   Recent Labs   Lab 04/26/25  1352      K 3.4*      CO2 28   BUN 47*   CREATININE 1.8*   CALCIUM 9.5   ALBUMIN 3.9   BILITOT 0.6   ALKPHOS 58   AST 22   ALT 17   ANIONGAP 11     Troponin:   Recent Labs   Lab 04/26/25  1352   TROPONINI 0.052*       Significant Imaging: I have reviewed all pertinent imaging results/findings within the past 24 hours.

## 2025-04-26 NOTE — PHARMACY MED REC
"Admission Medication History     The home medication history was taken by Varun Jeffries.    You may go to "Admission" then "Reconcile Home Medications" tabs to review and/or act upon these items.     The home medication list has been updated by the Pharmacy department.   Please read ALL comments highlighted in yellow.   Please address this information as you see fit.    Feel free to contact us if you have any questions or require assistance.      Medications listed below were obtained from: Analytic software- [employer], Medical records, and Patient's pharmacy  PTA Medications   Medication Sig    acetaminophen (TYLENOL) 500 MG tablet Take 500 mg by mouth nightly as needed.    ALPRAZolam (XANAX) 0.5 MG tablet Take 1 tablet (0.5 mg total) by mouth nightly as needed for Anxiety.    b complex vitamins capsule Take 1 capsule by mouth once daily.    calcium carbonate/vitamin D3 (VITAMIN D-3 ORAL) Take 4,000 Int'l Units/day by mouth once daily.    ELIQUIS 2.5 mg Tab TAKE 1 TABLET (2.5 MG TOTAL) BY MOUTH 2 (TWO) TIMES DAILY.    furosemide (LASIX) 40 MG tablet Take 1 tab in am and 1/2 tab in evening    latanoprostene bunod (VYZULTA) 0.024 % Drop Place 1 drop into both eyes every evening.    metOLazone (ZAROXOLYN) 2.5 MG tablet Take 1 tablet (2.5 mg total) by mouth as needed (aprx once weekly).    midodrine (PROAMATINE) 2.5 MG Tab Take 1 tablet (2.5 mg total) by mouth 3 (three) times daily.    nebivoloL (BYSTOLIC) 5 MG Tab Take 1 tablet (5 mg total) by mouth once daily.         Varun Jeffries  [id number]                            .          "

## 2025-04-26 NOTE — H&P
[facility]  [facility] Medicine  History & Physical    Patient Name: Shirley Metz  MRN: [id number]  Patient Class: IP- Inpatient  Admission Date: 4/26/2025  Attending Physician: Mignon Christiansen MD   Primary Care Provider: Phylicia Deleon MD         Patient information was obtained from patient and ER records.     Subjective:     Principal Problem:Symptomatic bradycardia    Chief Complaint:   Chief Complaint   Patient presents with    Dizziness     Pt states she suddenly started feeling dizzy and called 911. Upon ems arrival pt is noted to have periods of her heart pausing causing her to become more dizzy and light headed.         HPI: 89-year-old  female with history of persistent AFib on Eliquis, chronic diastolic CHF, glaucoma, morbid obesity, hypertension presents from assisted living with complaints of dizziness.  States she was in her normal state of health when she suddenly felt dizzy, lightheaded, blurry vision felt faint.  This was persistent prompting ER visit.  In the ER was noted to have heart rate between 101 10, and bouts of bradycardia into the 30s with brief pauses.  Had intermittent PACs/PVCs.  She states her heart rate typically runs 100- 110 Has had several ablations by Dr. Kelly in the past.  Was on metoprolol but stopped 6 weeks ago due to issues with blood pressure running low.  She has also been on midodrine as needed.  Denies any chest pain, lower extremity edema, nausea, vomiting fevers, chills, cough congestion, diarrhea.  She states her    Labs notable for potassium 3.4, troponin 0.052, chest x-ray without any acute findings.  EKG her, heart rate 100, intermittent PVCs, no ischemic ST changes noted.          Past Medical History:   Diagnosis Date    Acute on chronic diastolic congestive heart failure 01/11/2023    Anemia 11/29/2018    Anxiety 11/28/2017    Arthritis     Atrial fibrillation with RVR 10/09/2019    Back pain     Basal cell carcinoma 03/29/2018     left mid back    Chronic diastolic congestive heart failure 10/15/2019    CKD (chronic kidney disease) stage 3, GFR 30-59 ml/min 08/08/2016    Colon polyps     reported per patient.     Coronary artery calcification 10/05/2021    Elevated liver enzymes 12/20/2019    Elevated troponin 03/15/2018    Fuchs' corneal dystrophy     General anesthetics causing adverse effect in therapeutic use     woke up during surgery and gagged on ET tube    Glaucoma     Glaucoma     Heart failure with preserved left ventricular function (HFpEF) 07/24/2018    Hyperlipidemia     Hypertension     Macular degeneration dry    Medication side effects 05/03/2017    Obesity     Pre-diabetes 12/04/2017    PVD (peripheral vascular disease) 04/26/2021    PVD (peripheral vascular disease) 04/26/2021    Squamous cell carcinoma 01/08/2019    left shoulder    Stenosis of carotid artery 10/05/2021    Troponin level elevated 01/11/2023    Trouble in sleeping     Type 2 diabetes mellitus without complication, without long-term current use of insulin 02/24/2021    Urinary tract infection        Past Surgical History:   Procedure Laterality Date    ABLATION OF ARRHYTHMOGENIC FOCUS FOR ATRIAL FIBRILLATION N/A 11/03/2022    Procedure: Ablation atrial fibrillation;  Surgeon: Toi Kelly MD;  Location: [facility] EP LAB;  Service: Cardiology;  Laterality: N/A;  afib, PVI/CTI, RFA, STACEY (cx if SR), DEDE, ABDIEL orantes, 3 Prep    ADENOIDECTOMY  1938    BREAST BIOPSY Left     1997. benign    BREAST CYST EXCISION Left 1997 approx    CARPAL TUNNEL RELEASE Right 2012 approx    CATARACT EXTRACTION Bilateral 1994    CHOLECYSTECTOMY  1975    CORNEAL TRANSPLANT Bilateral 08/2015 8/2015 - left; Right 4/2016    EYE SURGERY  12/06/2023    Fuch's Corneal dystrophy - had 2nd operation with Dr. Quintanilla    EYE SURGERY      Cataract wIOL    left thumb Left 2011    removed cuboid for arthritis    REVERSE TOTAL SHOULDER ARTHROPLASTY Left 08/21/2018    Procedure: ARTHROPLASTY,  SHOULDER, TOTAL, REVERSE;  Surgeon: Solomon Lujan MD;  Location: [facility] OR;  Service: Orthopedics;  Laterality: Left;  WITH BICEP TENODESIS    SKIN CANCER EXCISION Left 2017    BCC removal by Dr. Rory MERCADOT- OS (aka TRABECULOPLASTY) Left 05/11/2011    repeat 3/14/12    TENOTOMY Left 08/21/2018    Procedure: TENOTOMY;  Surgeon: Solomon Lujan MD;  Location: [facility] OR;  Service: Orthopedics;  Laterality: Left;    TONSILLECTOMY  1938    TRANSESOPHAGEAL ECHOCARDIOGRAM WITH POSSIBLE CARDIOVERSION (STACEY W/ POSS CARDIOVERSION) N/A 03/21/2023    Procedure: Transesophageal echo (STACEY) intra-procedure log documentation;  Surgeon: Trevon Ramirez MD;  Location: [facility] CATH LAB;  Service: Cardiology;  Laterality: N/A;    TREATMENT OF CARDIAC ARRHYTHMIA N/A 10/10/2019    Procedure: CARDIOVERSION;  Surgeon: Pete Durán MD;  Location: [facility] CATH LAB;  Service: Cardiology;  Laterality: N/A;    TREATMENT OF CARDIAC ARRHYTHMIA N/A 12/06/2019    Procedure: CARDIOVERSION;  Surgeon: Samy Castillo MD;  Location: [facility] CATH LAB;  Service: Cardiology;  Laterality: N/A;       Review of patient's allergies indicates:   Allergen Reactions    Carvedilol Swelling     Lip swelling    Cephalexin Other (See Comments)     Muscle/joint weakness unable to move     Doxycycline Shortness Of Breath, Nausea And Vomiting and Other (See Comments)     weakness    Erythromycin Other (See Comments)     Complete weakness/could not walk    Gadolinium-containing contrast media Swelling     angioedema    Hydrocodone-acetaminophen Shortness Of Breath     wheezing    Labetalol Shortness Of Breath, Nausea Only, Palpitations and Other (See Comments)     weakness    Loratadine Other (See Comments)     Double vision/spots  Other reaction(s): double vision    Losartan Other (See Comments)     weakness    Loteprednol etabonate Other (See Comments), Palpitations, Anxiety and Shortness Of Breath     Patient stated bp went up and extreme muscle weakness    Naproxen       wheezing  wheezing  wheezing  Other reaction(s): wheezing    Prednisone Other (See Comments)     Myalgias and generalized weakness, unable to walk    Statins-hmg-coa reductase inhibitors Other (See Comments)     Severe Muscle weakness  Muscle weakness  Severe Muscle weakness  Other reaction(s): severe muscle weakness    Amlodipine Other (See Comments)     Myalgias    Fenofibrate Other (See Comments)     muscle weakness      Hydralazine Other (See Comments)     muscle tremors    Loteprednol Other (See Comments)     increased BP    Macrolide antibiotics Other (See Comments)     Complete weakness/could not walk      Moxifloxacin Hives and Other (See Comments)     muscle soreness    Timolol Other (See Comments)     Blurred vision, Burning eyes, Severe muscle weakness, eye problems.      Verapamil Diarrhea     Severe diarrhea.      Hydrocortisone     Isosorbide      Tolerates Imdur    Silver sulfadiaz-foam bandage     Tetanus and diphtheria toxoids     Tetanus vaccines and toxoid     Adhesive Rash     Clonidine patch--contact dermatitis    Codeine Diarrhea and Other (See Comments)     Loss of balance       Doxazosin Palpitations    Fexofenadine Other (See Comments)     Double vision/spots       Hydrocortisone (bulk) Other (See Comments)     Only suppository/ caused muscle weakness    Latanoprost Other (See Comments)     Muscle weakness      Olopatadine Other (See Comments)     Muscle weakness         No current facility-administered medications on file prior to encounter.     Current Outpatient Medications on File Prior to Encounter   Medication Sig    acetaminophen (TYLENOL) 500 MG tablet Take 500 mg by mouth nightly as needed.    ALPRAZolam (XANAX) 0.5 MG tablet Take 1 tablet (0.5 mg total) by mouth nightly as needed for Anxiety.    b complex vitamins capsule Take 1 capsule by mouth once daily.    calcium carbonate/vitamin D3 (VITAMIN D-3 ORAL) Take 4,000 Int'l Units/day by mouth once daily.    ELIQUIS 2.5 mg Tab TAKE  1 TABLET (2.5 MG TOTAL) BY MOUTH 2 (TWO) TIMES DAILY.    furosemide (LASIX) 40 MG tablet Take 1 tab in am and 1/2 tab in evening    latanoprostene bunod (VYZULTA) 0.024 % Drop Place 1 drop into both eyes every evening.    metOLazone (ZAROXOLYN) 2.5 MG tablet Take 1 tablet (2.5 mg total) by mouth as needed (aprx once weekly).    midodrine (PROAMATINE) 2.5 MG Tab Take 1 tablet (2.5 mg total) by mouth 3 (three) times daily.    nebivoloL (BYSTOLIC) 5 MG Tab Take 1 tablet (5 mg total) by mouth once daily.    [DISCONTINUED] cloNIDine (CATAPRES) 0.1 MG tablet Take 1 tablet (0.1 mg total) by mouth 2 (two) times daily as needed (systolic BP over 180).    [DISCONTINUED] isosorbide mononitrate (IMDUR) 30 MG 24 hr tablet Take 1 tablet (30 mg total) by mouth every evening.     Family History       Problem Relation (Age of Onset)    Cancer Father (88)    Heart disease Mother    Hypertension Mother          Tobacco Use    Smoking status: Never    Smokeless tobacco: Never    Tobacco comments:     history of passive smoking from her ex-   Substance and Sexual Activity    Alcohol use: Yes     Alcohol/week: 2.0 standard drinks of alcohol     Types: 2 Standard drinks or equivalent per week     Comment: occ    Drug use: No    Sexual activity: Not Currently     Partners: Male     Birth control/protection: Post-menopausal     Comment: discussed protection for STD's     Review of Systems   All other systems reviewed and are negative.    Objective:     Vital Signs (Most Recent):  Temp: 97.1 °F (36.2 °C) (04/26/25 1603)  Pulse: (!) 19 (3.178 r-r pauses) (04/26/25 1708)  Resp: 16 (04/26/25 1603)  BP: 130/74 (04/26/25 1603)  SpO2: 95 % (04/26/25 1603) Vital Signs (24h Range):  Temp:  [97.1 °F (36.2 °C)-98.2 °F (36.8 °C)] 97.1 °F (36.2 °C)  Pulse:  [] 19  Resp:  [15-20] 16  SpO2:  [95 %-99 %] 95 %  BP: (130-155)/(60-75) 130/74     Weight: 83.5 kg (184 lb 1.4 oz)  Body mass index is 31.6 kg/m².     Physical Exam  Constitutional:        General: She is not in acute distress.  Eyes:      Extraocular Movements: Extraocular movements intact.      Pupils: Pupils are equal, round, and reactive to light.   Cardiovascular:      Rate and Rhythm: Normal rate.      Heart sounds: No murmur heard.     Comments: Intermittent tachycardia to 105  Pulmonary:      Effort: Pulmonary effort is normal. No respiratory distress.      Breath sounds: No wheezing.   Abdominal:      General: There is no distension.      Tenderness: There is no abdominal tenderness.      Comments: Obese abdomen nontender   Musculoskeletal:         General: No swelling or tenderness.   Skin:     General: Skin is warm and dry.   Neurological:      General: No focal deficit present.      Mental Status: She is alert and oriented to person, place, and time.      Cranial Nerves: No cranial nerve deficit.   Psychiatric:         Mood and Affect: Mood normal.         Behavior: Behavior normal.              CRANIAL NERVES     CN III, IV, VI   Pupils are equal, round, and reactive to light.       Significant Labs: All pertinent labs within the past 24 hours have been reviewed.  CBC:   Recent Labs   Lab 04/26/25  1352   WBC 7.44   HGB 12.5   HCT 38.1        CMP:   Recent Labs   Lab 04/26/25  1352      K 3.4*      CO2 28   BUN 47*   CREATININE 1.8*   CALCIUM 9.5   ALBUMIN 3.9   BILITOT 0.6   ALKPHOS 58   AST 22   ALT 17   ANIONGAP 11     Troponin:   Recent Labs   Lab 04/26/25  1352   TROPONINI 0.052*       Significant Imaging: I have reviewed all pertinent imaging results/findings within the past 24 hours.  Assessment/Plan:     Assessment & Plan  Symptomatic bradycardia    -admit to inpatient  -monitor on tele  -hold darryl blocking agents, hold Eliquis, check 2D echo  -cardiology consulted recs appreciated  -possible pacemaker placement.  Chronic diastolic CHF (congestive heart failure)  Patient has Diastolic (HFpEF) heart failure that is Chronic. On presentation their CHF was well  compensated. Most recent BNP and echo results are listed below.  Recent Labs     04/26/25  1352   *     Latest ECHO  Results for orders placed during the hospital encounter of 07/11/22    Echo    Interpretation Summary  · Concentric remodeling and normal systolic function.  · The estimated ejection fraction is 60%.  · Normal left ventricular diastolic function.  · Normal right ventricular size with normal right ventricular systolic function.    Current Heart Failure Medications  furosemide tablet 40 mg, Daily, Oral  furosemide tablet 20 mg, Nightly, Oral    Plan  - Monitor strict I&Os and daily weights.    - Place on telemetry  - Low sodium diet  - Place on fluid restriction of 1.5 L.   - Cardiology has been consulted  - The patient's volume status is at their baseline  - continue home Lasix regimen.  Due to issues with blood pressure, has not tolerated metoprolol or Ace/Arb.  Currently holding Bystolic due to bradycardia.      Persistent atrial fibrillation  Patient has persistent (7 days or more) atrial fibrillation. Patient is currently in sinus rhythm. [id number] Score: 5. The patients heart rate in the last 24 hours is as follows:  Pulse  Min: 19  Max: 113     Antiarrhythmics       Anticoagulants  heparin (porcine) injection 5,000 Units, Every 8 hours, Subcutaneous    Plan  - holding Eliquis for possible pacemaker placement  -holding all darryl blocking agents due to bradycardia      HTN (hypertension)  Patient's blood pressure range in the last 24 hours was: BP  Min: 130/74  Max: 155/60.The patient's inpatient anti-hypertensive regimen is listed below:  Current Antihypertensives  furosemide tablet 40 mg, Daily, Oral  furosemide tablet 20 mg, Nightly, Oral    Plan  - resume lasix  COAG (chronic open-angle glaucoma) - Both Eyes  -resume home eye drops.  Patient brought her own.    Obesity (BMI 30.0-34.9)  -educated on dietary changes    Type 2 diabetes mellitus with chronic kidney disease, without  long-term current use of insulin  -diet controlled.  Monitor daily glucose.  VTE Risk Mitigation (From admission, onward)           Ordered     heparin (porcine) injection 5,000 Units  Every 8 hours         04/26/25 1621     IP VTE HIGH RISK PATIENT  Once         04/26/25 1621     Place sequential compression device  Until discontinued         04/26/25 1621                                    Mignon Christiansen MD  Department of Hospital Medicine  [facility]

## 2025-04-26 NOTE — PLAN OF CARE
Urine sent to lab. HR dropped to 20 for 3 beats, the returned to low 100s, asymptomatic. Updated patient on plan of care. Instructed patient to use call light for assistance, call light in reach. Hourly rounding performed. Vitals q4 hours. Education provided, questions answered/encouraged. Chart check complete.     Problem: Adult Inpatient Plan of Care  Goal: Plan of Care Review  Outcome: Progressing

## 2025-04-26 NOTE — ASSESSMENT & PLAN NOTE
Patient's blood pressure range in the last 24 hours was: BP  Min: 130/74  Max: 155/60.The patient's inpatient anti-hypertensive regimen is listed below:  Current Antihypertensives  furosemide tablet 40 mg, Daily, Oral  furosemide tablet 20 mg, Nightly, Oral    Plan  - resume lasix

## 2025-04-26 NOTE — ED PROVIDER NOTES
SCRIBE #1 NOTE: I, Fady Landin, am scribing for, and in the presence of, Adelita Henderson MD. I have scribed the entire note.       History     Chief Complaint   Patient presents with    Dizziness     Pt states she suddenly started feeling dizzy and called 911. Upon ems arrival pt is noted to have periods of her heart pausing causing her to become more dizzy and light headed.      Review of patient's allergies indicates:   Allergen Reactions    Carvedilol Swelling     Lip swelling    Cephalexin Other (See Comments)     Muscle/joint weakness unable to move     Doxycycline Shortness Of Breath, Nausea And Vomiting and Other (See Comments)     weakness    Erythromycin Other (See Comments)     Complete weakness/could not walk    Gadolinium-containing contrast media Swelling     angioedema    Hydrocodone-acetaminophen Shortness Of Breath     wheezing    Labetalol Shortness Of Breath, Nausea Only, Palpitations and Other (See Comments)     weakness    Loratadine Other (See Comments)     Double vision/spots  Other reaction(s): double vision    Losartan Other (See Comments)     weakness    Loteprednol etabonate Other (See Comments), Palpitations, Anxiety and Shortness Of Breath     Patient stated bp went up and extreme muscle weakness    Naproxen      wheezing  wheezing  wheezing  Other reaction(s): wheezing    Prednisone Other (See Comments)     Myalgias and generalized weakness, unable to walk    Statins-hmg-coa reductase inhibitors Other (See Comments)     Severe Muscle weakness  Muscle weakness  Severe Muscle weakness  Other reaction(s): severe muscle weakness    Amlodipine Other (See Comments)     Myalgias    Fenofibrate Other (See Comments)     muscle weakness      Hydralazine Other (See Comments)     muscle tremors    Loteprednol Other (See Comments)     increased BP    Macrolide antibiotics Other (See Comments)     Complete weakness/could not walk      Moxifloxacin Hives and Other (See Comments)     muscle  soreness    Timolol Other (See Comments)     Blurred vision, Burning eyes, Severe muscle weakness, eye problems.      Verapamil Diarrhea     Severe diarrhea.      Hydrocortisone     Isosorbide      Tolerates Imdur    Silver sulfadiaz-foam bandage     Tetanus and diphtheria toxoids     Tetanus vaccines and toxoid     Adhesive Rash     Clonidine patch--contact dermatitis    Codeine Diarrhea and Other (See Comments)     Loss of balance       Doxazosin Palpitations    Fexofenadine Other (See Comments)     Double vision/spots       Hydrocortisone (bulk) Other (See Comments)     Only suppository/ caused muscle weakness    Latanoprost Other (See Comments)     Muscle weakness      Olopatadine Other (See Comments)     Muscle weakness           History of Present Illness     HPI    4/26/2025, 1:41 PM  History obtained from the patient, medical records, and EMS      History of Present Illness: Shirley Metz is a 89 y.o. female patient with a PMHx of diastolic CHF, anemia, anxiety, arthritis, A-fib RVR, back pain, basal cell carcinoma, CKD, coronary artery calcification, elevated liver enzymes, elevated troponin, HFpEF, HLD, HTN, macular degeneration, medication side effects, obesity, T2DM, PVD, squamous cell carcinoma, and stenosis of carotid artery who presents to the Emergency Department for evaluation of dizziness which she started to experience PTA. She reports that this onset suddenly. Per EMS, pt was in A-fib with 4-5 second asystole pauses en route. EMS reports systolic pressure of ~140. No mitigating or exacerbating factors reported. Pt has a history of A-fib and has received multiple ablations. She has no cardiac stents placed. She is currently anticoagulated with Eliqius. Patient denies any CP, SOB, or fever. No prior Tx specified.  No further complaints or concerns at this time.       Arrival mode: EMS    PCP: Phylicia Deleon MD        Past Medical History:  Past Medical History:   Diagnosis Date     Acute on chronic diastolic congestive heart failure 01/11/2023    Anemia 11/29/2018    Anxiety 11/28/2017    Arthritis     Atrial fibrillation with RVR 10/09/2019    Back pain     Basal cell carcinoma 03/29/2018    left mid back    Chronic diastolic congestive heart failure 10/15/2019    CKD (chronic kidney disease) stage 3, GFR 30-59 ml/min 08/08/2016    Colon polyps     reported per patient.     Coronary artery calcification 10/05/2021    Elevated liver enzymes 12/20/2019    Elevated troponin 03/15/2018    Fuchs' corneal dystrophy     General anesthetics causing adverse effect in therapeutic use     woke up during surgery and gagged on ET tube    Glaucoma     Glaucoma     Heart failure with preserved left ventricular function (HFpEF) 07/24/2018    Hyperlipidemia     Hypertension     Macular degeneration dry    Medication side effects 05/03/2017    Obesity     Pre-diabetes 12/04/2017    PVD (peripheral vascular disease) 04/26/2021    PVD (peripheral vascular disease) 04/26/2021    Squamous cell carcinoma 01/08/2019    left shoulder    Stenosis of carotid artery 10/05/2021    Troponin level elevated 01/11/2023    Trouble in sleeping     Type 2 diabetes mellitus without complication, without long-term current use of insulin 02/24/2021    Urinary tract infection        Past Surgical History:  Past Surgical History:   Procedure Laterality Date    ABLATION OF ARRHYTHMOGENIC FOCUS FOR ATRIAL FIBRILLATION N/A 11/03/2022    Procedure: Ablation atrial fibrillation;  Surgeon: Toi Kelly MD;  Location: [facility];  Service: Cardiology;  Laterality: N/A;  afib, PVI/CTI, RFA, STACEY (cx if SR), DEDE, anes, MB, 3 Prep    ADENOIDECTOMY  1938    BREAST BIOPSY Left     1997. benign    BREAST CYST EXCISION Left 1997 approx    CARPAL TUNNEL RELEASE Right 2012 approx    CATARACT EXTRACTION Bilateral 1994    CHOLECYSTECTOMY  1975    CORNEAL TRANSPLANT Bilateral 08/2015 8/2015 - left; Right 4/2016    EYE SURGERY  12/06/2023     Fuch's Corneal dystrophy - had 2nd operation with Dr. Quintanilla    EYE SURGERY      Cataract wIOL    left thumb Left 2011    removed cuboid for arthritis    REVERSE TOTAL SHOULDER ARTHROPLASTY Left 08/21/2018    Procedure: ARTHROPLASTY, SHOULDER, TOTAL, REVERSE;  Surgeon: Solomon Lujan MD;  Location: [facility] OR;  Service: Orthopedics;  Laterality: Left;  WITH BICEP TENODESIS    SKIN CANCER EXCISION Left 2017    BCC removal by Dr. Valadez    SLT- OS (aka TRABECULOPLASTY) Left 05/11/2011    repeat 3/14/12    TENOTOMY Left 08/21/2018    Procedure: TENOTOMY;  Surgeon: Solomon Lujan MD;  Location: [facility] OR;  Service: Orthopedics;  Laterality: Left;    TONSILLECTOMY  1938    TRANSESOPHAGEAL ECHOCARDIOGRAM WITH POSSIBLE CARDIOVERSION (STACEY W/ POSS CARDIOVERSION) N/A 03/21/2023    Procedure: Transesophageal echo (STACEY) intra-procedure log documentation;  Surgeon: Trevon Ramirez MD;  Location: [facility] CATH LAB;  Service: Cardiology;  Laterality: N/A;    TREATMENT OF CARDIAC ARRHYTHMIA N/A 10/10/2019    Procedure: CARDIOVERSION;  Surgeon: Pete Durán MD;  Location: [facility] CATH LAB;  Service: Cardiology;  Laterality: N/A;    TREATMENT OF CARDIAC ARRHYTHMIA N/A 12/06/2019    Procedure: CARDIOVERSION;  Surgeon: Samy Castillo MD;  Location: [facility] CATH LAB;  Service: Cardiology;  Laterality: N/A;         Family History:  Family History   Problem Relation Name Age of Onset    Heart disease Mother      Hypertension Mother      Cancer Father  88        sarcoma( ?[place]'s ) on leg    Deep vein thrombosis Neg Hx      Ovarian cancer Neg Hx      Breast cancer Neg Hx      Kidney disease Neg Hx      Strabismus Neg Hx      Retinal detachment Neg Hx      Macular degeneration Neg Hx      Glaucoma Neg Hx      Blindness Neg Hx      Amblyopia Neg Hx      Eczema Neg Hx      Lupus Neg Hx      Melanoma Neg Hx      Psoriasis Neg Hx      Diabetes Neg Hx      Stroke Neg Hx      Intellectual disability Neg Hx      Mental illness Neg Hx      Hyperlipidemia Neg  Hx      COPD Neg Hx      Asthma Neg Hx      Depression Neg Hx      Alcohol abuse Neg Hx      Drug abuse Neg Hx         Social History:  Social History     Tobacco Use    Smoking status: Never    Smokeless tobacco: Never    Tobacco comments:     history of passive smoking from her ex-   Substance and Sexual Activity    Alcohol use: Yes     Alcohol/week: 2.0 standard drinks of alcohol     Types: 2 Standard drinks or equivalent per week     Comment: occ    Drug use: No    Sexual activity: Not Currently     Partners: Male     Birth control/protection: Post-menopausal     Comment: discussed protection for STD's        Review of Systems     Review of Systems   Constitutional:  Negative for fever.   HENT:  Negative for sore throat.    Respiratory:  Negative for shortness of breath.    Cardiovascular:  Negative for chest pain.   Gastrointestinal:  Negative for nausea.   Genitourinary:  Negative for dysuria.   Musculoskeletal:  Negative for back pain.   Skin:  Negative for rash.   Neurological:  Positive for dizziness and light-headedness. Negative for weakness.   Hematological:  Does not bruise/bleed easily.   All other systems reviewed and are negative.     Physical Exam     Initial Vitals   BP Pulse Resp Temp SpO2   04/26/25 1349 04/26/25 1348 04/26/25 1349 04/26/25 1349 04/26/25 1349   (!) 155/60 (!) 111 20 98.2 °F (36.8 °C) 97 %      MAP       --                 Physical Exam  Nursing Notes and Vital Signs Reviewed.  Constitutional: Patient is in no acute distress. Well-developed and well-nourished.  Head: Atraumatic. Normocephalic.  Eyes: PERRL. EOM intact. Conjunctivae are not pale. No scleral icterus.  ENT: Mucous membranes are moist. Oropharynx is clear and symmetric.    Neck: Supple. Full ROM. No lymphadenopathy.  Cardiovascular: Regular rate. Regular rhythm. No murmurs, rubs, or gallops. Distal pulses are 2+ and symmetric.  Pulmonary/Chest: No respiratory distress. Clear to auscultation bilaterally. No  wheezing or rales.  Abdominal: Soft and non-distended.  There is no tenderness.  No rebound, guarding, or rigidity. Good bowel sounds.  Genitourinary: No CVA tenderness.  Musculoskeletal: Moves all extremities. No obvious deformities. No edema. No calf tenderness.  Skin: Warm and dry.  Neurological:  Alert, awake, and appropriate.  Normal speech.  No acute focal neurological deficits are appreciated.  Psychiatric: Normal affect. Good eye contact. Appropriate in content.     ED Course   Procedures  ED Vital Signs:  Vitals:    04/27/25 0300 04/27/25 0304 04/27/25 0356 04/27/25 0400   BP:  115/76 118/78 110/71   Pulse: 86 109 97 98   Resp:  18 (!) 23 (!) 27   Temp:  97.8 °F (36.6 °C) 98.3 °F (36.8 °C)    TempSrc:  Oral Oral    SpO2:  (!) 93% 95% 95%   Weight:       Height:        04/27/25 0415 04/27/25 0430 04/27/25 0445 04/27/25 0500   BP: 109/77 (!) 112/54 (!) 118/55 131/65   Pulse: 108 107 109 106   Resp: (!) 32 (!) 24 19 (!) 36   Temp:       TempSrc:       SpO2: 95% (!) 94% (!) 93% (!) 94%   Weight:       Height:        04/27/25 0515 04/27/25 0530 04/27/25 0545 04/27/25 0600   BP: 129/60 (!) 109/57 (!) 107/56 (!) 99/47   Pulse: 95 86 93 94   Resp: (!) 28 (!) 29 (!) 22 (!) 26   Temp:       TempSrc:       SpO2: (!) 94% (!) 94% (!) 93% 95%   Weight:       Height:        04/27/25 0615 04/27/25 0630 04/27/25 0645   BP: 120/62 (!) 118/56 (!) 117/53   Pulse: 86 97 105   Resp: (!) 21 18 19   Temp:      TempSrc:      SpO2: 95% (!) 89% (!) 94%   Weight:      Height:          Abnormal Lab Results:  Labs Reviewed   COMPREHENSIVE METABOLIC PANEL - Abnormal       Result Value    Sodium 141      Potassium 3.4 (*)     Chloride 102      CO2 28      Glucose 183 (*)     BUN 47 (*)     Creatinine 1.8 (*)     Calcium 9.5      Protein Total 7.8      Albumin 3.9      Bilirubin Total 0.6      ALP 58      AST 22      ALT 17      Anion Gap 11      eGFR 26 (*)    B-TYPE NATRIURETIC PEPTIDE - Abnormal     (*)    TROPONIN I - Abnormal     Troponin-I 0.052 (*)    CBC WITH DIFFERENTIAL - Abnormal    WBC 7.44      RBC 3.76 (*)     HGB 12.5      HCT 38.1       (*)     MCH 33.2 (*)     MCHC 32.8      RDW 13.2      Platelet Count 220      MPV 9.4      Nucleated RBC 0      Neut % 64.2      Lymph % 22.7      Mono % 9.5      Eos % 2.8      Basophil % 0.5      Imm Grans % 0.3      Neut # 4.77      Lymph # 1.69      Mono # 0.71      Eos # 0.21      Baso # 0.04      Imm Grans # 0.02     LACTIC ACID, PLASMA - Normal    Lactic Acid Level 1.4      Narrative:     Falsely low lactic acid results can be found in samples containing >=13.0 mg/dL total bilirubin and/or >=3.5 mg/dL direct bilirubin.    PROCALCITONIN - Normal    Procalcitonin 0.08     CBC W/ AUTO DIFFERENTIAL    Narrative:     The following orders were created for panel order CBC auto differential.  Procedure                               Abnormality         Status                     ---------                               -----------         ------                     CBC with Differential[[id number]]       Abnormal            Final result                 Please view results for these tests on the individual orders.        All Lab Results:  Results for orders placed or performed during the hospital encounter of 04/26/25   Comprehensive metabolic panel    Collection Time: 04/26/25  1:52 PM   Result Value Ref Range    Sodium 141 136 - 145 mmol/L    Potassium 3.4 (L) 3.5 - 5.1 mmol/L    Chloride 102 95 - 110 mmol/L    CO2 28 23 - 29 mmol/L    Glucose 183 (H) 70 - 110 mg/dL    BUN 47 (H) 10 - 30 mg/dL    Creatinine 1.8 (H) 0.5 - 1.4 mg/dL    Calcium 9.5 8.7 - 10.5 mg/dL    Protein Total 7.8 6.0 - 8.4 gm/dL    Albumin 3.9 3.5 - 5.2 g/dL    Bilirubin Total 0.6 0.1 - 1.0 mg/dL    ALP 58 40 - 150 unit/L    AST 22 11 - 45 unit/L    ALT 17 10 - 44 unit/L    Anion Gap 11 8 - 16 mmol/L    eGFR 26 (L) >60 mL/min/1.73/m2   Brain natriuretic peptide    Collection Time: 04/26/25  1:52 PM   Result Value Ref  Range     (H) 0 - 99 pg/mL   Troponin I    Collection Time: 04/26/25  1:52 PM   Result Value Ref Range    Troponin-I 0.052 (H) <=0.026 ng/mL   Procalcitonin    Collection Time: 04/26/25  1:52 PM   Result Value Ref Range    Procalcitonin 0.08 <0.25 ng/mL   CBC with Differential    Collection Time: 04/26/25  1:52 PM   Result Value Ref Range    WBC 7.44 3.90 - 12.70 K/uL    RBC 3.76 (L) 4.00 - 5.40 M/uL    HGB 12.5 12.0 - 16.0 gm/dL    HCT 38.1 37.0 - 48.5 %     (H) 82 - 98 fL    MCH 33.2 (H) 27.0 - 31.0 pg    MCHC 32.8 32.0 - 36.0 g/dL    RDW 13.2 11.5 - 14.5 %    Platelet Count 220 150 - 450 K/uL    MPV 9.4 9.2 - 12.9 fL    Nucleated RBC 0 <=0 /100 WBC    Neut % 64.2 38 - 73 %    Lymph % 22.7 18 - 48 %    Mono % 9.5 4 - 15 %    Eos % 2.8 <=8 %    Basophil % 0.5 <=1.9 %    Imm Grans % 0.3 0.0 - 0.5 %    Neut # 4.77 1.8 - 7.7 K/uL    Lymph # 1.69 1 - 4.8 K/uL    Mono # 0.71 0.3 - 1 K/uL    Eos # 0.21 <=0.5 K/uL    Baso # 0.04 <=0.2 K/uL    Imm Grans # 0.02 0.00 - 0.04 K/uL   Blood culture x two cultures. Draw prior to antibiotics.    Collection Time: 04/26/25  1:53 PM    Specimen: Peripheral, Antecubital, Left; Blood   Result Value Ref Range    Blood Culture No Growth After 6 Hours    Lactic acid, plasma #1    Collection Time: 04/26/25  1:58 PM   Result Value Ref Range    Lactic Acid Level 1.4 0.5 - 2.2 mmol/L   Blood culture x two cultures. Draw prior to antibiotics.    Collection Time: 04/26/25  2:07 PM    Specimen: Peripheral, Hand, Right; Blood   Result Value Ref Range    Blood Culture No Growth After 6 Hours    POCT glucose    Collection Time: 04/26/25  4:17 PM   Result Value Ref Range    POCT Glucose 146 (H) 70 - 110 mg/dL   Urinalysis, Reflex to Urine Culture    Collection Time: 04/26/25  4:29 PM    Specimen: Urine, Clean Catch   Result Value Ref Range    Color, UA Yellow Straw, Pallavi, Yellow, Light-Orange    Appearance, UA Clear Clear    pH, UA 6.0 5.0 - 8.0    Spec Grav UA 1.010 1.005 - 1.030     Protein, UA Negative Negative    Glucose, UA Negative Negative    Ketones, UA Negative Negative    Bilirubin, UA Negative Negative    Blood, UA Negative Negative    Nitrites, UA Negative Negative    Urobilinogen, UA Negative <2.0 EU/dL    Leukocyte Esterase, UA Trace (A) Negative   GREY TOP URINE HOLD    Collection Time: 04/26/25  4:29 PM   Result Value Ref Range    Extra Tube Hold for add-ons.    Urinalysis Microscopic    Collection Time: 04/26/25  4:29 PM   Result Value Ref Range    RBC, UA 3 0 - 4 /HPF    WBC, UA 8 (H) 0 - 5 /HPF    Bacteria, UA Many (A) None, Rare, Occasional /HPF    Squamous Epithelial Cells, UA 2 /HPF    Hyaline Casts, UA 1 0 - 1 /LPF    Microscopic Comment     Lactic acid, plasma #2    Collection Time: 04/26/25  6:48 PM   Result Value Ref Range    Lactic Acid Level 1.9 0.5 - 2.2 mmol/L   Basic Metabolic Panel (BMP)    Collection Time: 04/27/25  4:08 AM   Result Value Ref Range    Sodium 139 136 - 145 mmol/L    Potassium 3.2 (L) 3.5 - 5.1 mmol/L    Chloride 100 95 - 110 mmol/L    CO2 27 23 - 29 mmol/L    Glucose 185 (H) 70 - 110 mg/dL    BUN 48 (H) 10 - 30 mg/dL    Creatinine 1.7 (H) 0.5 - 1.4 mg/dL    Calcium 9.1 8.7 - 10.5 mg/dL    Anion Gap 12 8 - 16 mmol/L    eGFR 28 (L) >60 mL/min/1.73/m2   CBC with Differential    Collection Time: 04/27/25  4:08 AM   Result Value Ref Range    WBC 8.18 3.90 - 12.70 K/uL    RBC 3.45 (L) 4.00 - 5.40 M/uL    HGB 11.6 (L) 12.0 - 16.0 gm/dL    HCT 34.6 (L) 37.0 - 48.5 %     (H) 82 - 98 fL    MCH 33.6 (H) 27.0 - 31.0 pg    MCHC 33.5 32.0 - 36.0 g/dL    RDW 13.2 11.5 - 14.5 %    Platelet Count 202 150 - 450 K/uL    MPV 9.7 9.2 - 12.9 fL    Nucleated RBC 0 <=0 /100 WBC    Neut % 60.0 38 - 73 %    Lymph % 26.2 18 - 48 %    Mono % 10.3 4 - 15 %    Eos % 2.8 <=8 %    Basophil % 0.5 <=1.9 %    Imm Grans % 0.2 0.0 - 0.5 %    Neut # 4.91 1.8 - 7.7 K/uL    Lymph # 2.14 1 - 4.8 K/uL    Mono # 0.84 0.3 - 1 K/uL    Eos # 0.23 <=0.5 K/uL    Baso # 0.04 <=0.2 K/uL     Imm Grans # 0.02 0.00 - 0.04 K/uL   Basic Metabolic Panel    Collection Time: 04/27/25  4:08 AM   Result Value Ref Range    Sodium 139 136 - 145 mmol/L    Potassium 3.2 (L) 3.5 - 5.1 mmol/L    Chloride 100 95 - 110 mmol/L    CO2 28 23 - 29 mmol/L    Glucose 188 (H) 70 - 110 mg/dL    BUN 47 (H) 10 - 30 mg/dL    Creatinine 1.7 (H) 0.5 - 1.4 mg/dL    Calcium 9.2 8.7 - 10.5 mg/dL    Anion Gap 11 8 - 16 mmol/L    eGFR 28 (L) >60 mL/min/1.73/m2   Magnesium    Collection Time: 04/27/25  4:08 AM   Result Value Ref Range    Magnesium  2.0 1.6 - 2.6 mg/dL   Troponin I    Collection Time: 04/27/25  4:08 AM   Result Value Ref Range    Troponin-I 0.099 (H) <=0.026 ng/mL   Phosphorus    Collection Time: 04/27/25  4:08 AM   Result Value Ref Range    Phosphorus Level 3.2 2.7 - 4.5 mg/dL   TSH    Collection Time: 04/27/25  4:08 AM   Result Value Ref Range    TSH 2.946 0.400 - 4.000 uIU/mL   Phosphorus    Collection Time: 04/27/25  4:08 AM   Result Value Ref Range    Phosphorus Level 3.2 2.7 - 4.5 mg/dL     *Note: Due to a large number of results and/or encounters for the requested time period, some results have not been displayed. A complete set of results can be found in Results Review.       Imaging Results:  Imaging Results              X-Ray Chest AP Portable (Final result)  Result time 04/26/25 14:26:41      Final result by Clemente Sarabia MD (04/26/25 14:26:41)                   Impression:      1.  Negative for acute process involving the chest.  2.  Incidental findings as noted above.    Finalized on: 4/26/2025 2:26 PM By:  Clemente Sarabia MD  [facility]# [id number]      2025-04-26 14:28:50.524     [facility]               Narrative:    EXAM:  XR CHEST AP PORTABLE    CLINICAL INDICATION:  Sepsis    FINDINGS:  Frontal views of the chest were obtained.    Comparisons are made to 08/04/2023. Multiple life saving devices overlie the chest. The lungs are free of alveolar opacities. The cardiac silhouette size is enlarged. The trachea is  midline and the mediastinal width is normal. Negative for focal infiltrate, effusion or pneumothorax. Pulmonary vasculature is normal. Negative for osseous abnormalities.  Convex left curvature of the thoracic spine.  There are atherosclerotic calcifications of the aortic knob and tortuosity of the aorta.  There are degenerative changes of the spine.  Degenerative changes of the right shoulder girdle.  Post operative changes involve the left shoulder girdle.                                         The EKG was ordered, reviewed, and independently interpreted by the ED provider.  Interpretation time: 13:35  Rate: 100 BPM  Rhythm:  sinus rhythm with premature supraventricular complexes and with occasional premature ventricular complexes  Interpretation: Rightward axis. Nonspecific ST abnormality. Prolonged QT. No STEMI.           The Emergency Provider reviewed the vital signs and test results, which are outlined above.     ED Discussion     2:52 PM: Discussed case with Annette Banegas NP on behalf of Dr. Christiansen ([facility]). Dr. Christiansen agrees with current care and management of pt and accepts admission.   Admitting Service: [facility]  Admitting Physician: Dr. Christiansen  Admit to: Inpatient    2:53 PM: Re-evaluated pt.  I have discussed test results, shared treatment plan, and the need for admission with patient/family/caretaker at bedside. Pt and/or family/caretaker express understanding at this time and agree with all information. All questions answered. Pt/caretaker/family member(s) have no further questions or concerns at this time. Pt is ready for admit.       Medical Decision Making  89 yo female began having dizziness and feeling like she was going to pass out today during lunch. She is having variability in her HR, with rates in the 30s and then up to 100-110. She is also having brief pauses on rhythm strip lasting several seconds, then rapid swings from bradycardia to tachcardia. EKG shows NSR with  PACs, PVCs. She has h/o afib and is on eliquis. She has had several ablations with Dr. Kelly. Cardiology recommends holding bystolic and eliquis and admit to [facility].    Amount and/or Complexity of Data Reviewed  Independent Historian: EMS  External Data Reviewed: labs, radiology, ECG and notes.  Labs: ordered. Decision-making details documented in ED Course.  Radiology: ordered and independent interpretation performed. Decision-making details documented in ED Course.  ECG/medicine tests: ordered and independent interpretation performed. Decision-making details documented in ED Course.    Risk  OTC drugs.  Prescription drug management.  Decision regarding hospitalization.    Critical Care  Total time providing critical care: 45 minutes                ED Medication(s):  Medications   sodium chloride 0.9% flush 10 mL (has no administration in time range)   naloxone 0.4 mg/mL injection 0.02 mg (has no administration in time range)   glucose chewable tablet 16 g (has no administration in time range)   glucose chewable tablet 24 g (has no administration in time range)   dextrose 50% injection 12.5 g (has no administration in time range)   dextrose 50% injection 25 g (has no administration in time range)   glucagon (human recombinant) injection 1 mg (has no administration in time range)   bisacodyL suppository 10 mg (has no administration in time range)   polyethylene glycol packet 17 g (has no administration in time range)   prochlorperazine injection Soln 5 mg (has no administration in time range)   melatonin tablet 6 mg (has no administration in time range)   ALPRAZolam tablet 0.5 mg (0.5 mg Oral Given 4/26/25 2204)   furosemide tablet 40 mg (has no administration in time range)   furosemide tablet 20 mg (20 mg Oral Given 4/26/25 2204)   lactated ringers infusion ( Intravenous New Bag 4/26/25 4845)   acetaminophen tablet 650 mg (650 mg Oral Given 4/27/25 0215)   dextrose 50% injection 12.5 g (has no administration in time  range)   glucagon (human recombinant) injection 1 mg (has no administration in time range)   insulin aspart U-100 pen 0-5 Units (has no administration in time range)   famotidine tablet 20 mg (has no administration in time range)   mupirocin 2 % ointment (has no administration in time range)   chlorhexidine 0.12 % solution 15 mL (has no administration in time range)   magnesium oxide tablet 400 mg (400 mg Oral Given 4/27/25 0605)   potassium bicarbonate disintegrating tablet 60 mEq (60 mEq Oral Given 4/27/25 0605)       Current Discharge Medication List                  Scribe Attestation:   Scribe #1: I performed the above scribed service and the documentation accurately describes the services I performed. I attest to the accuracy of the note.     Attending:   Physician Attestation Statement for Scribe #1: I, Adelita Henderson MD, personally performed the services described in this documentation, as scribed by Fady Landin, in my presence, and it is both accurate and complete.           Clinical Impression       ICD-10-CM ICD-9-CM   1. Bradycardia  R00.1 427.89   2. Screening for cardiovascular condition  Z13.6 V81.2   3. Dizziness  R42 780.4   4. Chest pain  R07.9 786.50       Disposition:   Disposition: Admitted  Condition: Adelita Hermosillo MD  04/27/25 0802

## 2025-04-27 ENCOUNTER — PATIENT MESSAGE (OUTPATIENT)
Dept: PALLIATIVE MEDICINE | Facility: CLINIC | Age: OVER 89
End: 2025-04-27
Payer: MEDICARE

## 2025-04-27 PROBLEM — I50.32 CHRONIC DIASTOLIC CONGESTIVE HEART FAILURE: Status: ACTIVE | Noted: 2025-04-27

## 2025-04-27 PROBLEM — I49.5 SICK SINUS SYNDROME: Status: ACTIVE | Noted: 2025-04-27

## 2025-04-27 PROBLEM — I49.5 TACHY-BRADY SYNDROME: Status: ACTIVE | Noted: 2025-04-27

## 2025-04-27 LAB
ABSOLUTE EOSINOPHIL (OHS): 0.23 K/UL
ABSOLUTE MONOCYTE (OHS): 0.84 K/UL (ref 0.3–1)
ABSOLUTE NEUTROPHIL COUNT (OHS): 4.91 K/UL (ref 1.8–7.7)
ANION GAP (OHS): 11 MMOL/L (ref 8–16)
ANION GAP (OHS): 12 MMOL/L (ref 8–16)
ASCENDING AORTA: 3.2 CM
AV INDEX (PROSTH): 0.53
AV MEAN GRADIENT: 4 MMHG
AV PEAK GRADIENT: 7 MMHG
AV VALVE AREA BY VELOCITY RATIO: 2.1 CM²
AV VALVE AREA: 1.8 CM²
AV VELOCITY RATIO: 0.62
BASOPHILS # BLD AUTO: 0.04 K/UL
BASOPHILS NFR BLD AUTO: 0.5 %
BSA FOR ECHO PROCEDURE: 1.94 M2
BUN SERPL-MCNC: 47 MG/DL (ref 10–30)
BUN SERPL-MCNC: 48 MG/DL (ref 10–30)
CALCIUM SERPL-MCNC: 9.1 MG/DL (ref 8.7–10.5)
CALCIUM SERPL-MCNC: 9.2 MG/DL (ref 8.7–10.5)
CHLORIDE SERPL-SCNC: 100 MMOL/L (ref 95–110)
CHLORIDE SERPL-SCNC: 100 MMOL/L (ref 95–110)
CO2 SERPL-SCNC: 27 MMOL/L (ref 23–29)
CO2 SERPL-SCNC: 28 MMOL/L (ref 23–29)
CREAT SERPL-MCNC: 1.7 MG/DL (ref 0.5–1.4)
CREAT SERPL-MCNC: 1.7 MG/DL (ref 0.5–1.4)
CV ECHO LV RWT: 0.49 CM
DOP CALC AO PEAK VEL: 1.3 M/S
DOP CALC AO VTI: 27.9 CM
DOP CALC LVOT AREA: 3.5 CM2
DOP CALC LVOT DIAMETER: 2.1 CM
DOP CALC LVOT PEAK VEL: 0.8 M/S
DOP CALC LVOT STROKE VOLUME: 51.2 CM3
DOP CALCLVOT PEAK VEL VTI: 14.8 CM
E WAVE DECELERATION TIME: 194 MSEC
E/A RATIO: 3.81
E/E' RATIO: 14 M/S
ECHO LV POSTERIOR WALL: 1 CM (ref 0.6–1.1)
ERYTHROCYTE [DISTWIDTH] IN BLOOD BY AUTOMATED COUNT: 13.2 % (ref 11.5–14.5)
FOLATE SERPL-MCNC: 9.3 NG/ML (ref 4–24)
FRACTIONAL SHORTENING: 36.6 % (ref 28–44)
GFR SERPLBLD CREATININE-BSD FMLA CKD-EPI: 28 ML/MIN/1.73/M2
GFR SERPLBLD CREATININE-BSD FMLA CKD-EPI: 28 ML/MIN/1.73/M2
GLUCOSE SERPL-MCNC: 185 MG/DL (ref 70–110)
GLUCOSE SERPL-MCNC: 188 MG/DL (ref 70–110)
HCT VFR BLD AUTO: 34.6 % (ref 37–48.5)
HGB BLD-MCNC: 11.6 GM/DL (ref 12–16)
IMM GRANULOCYTES # BLD AUTO: 0.02 K/UL (ref 0–0.04)
IMM GRANULOCYTES NFR BLD AUTO: 0.2 % (ref 0–0.5)
INTERVENTRICULAR SEPTUM: 0.9 CM (ref 0.6–1.1)
IVRT: 63 MSEC
LA MAJOR: 7 CM
LA MINOR: 7 CM
LA WIDTH: 4.9 CM
LEFT ATRIUM AREA SYSTOLIC (APICAL 2 CHAMBER): 32.79 CM2
LEFT ATRIUM AREA SYSTOLIC (APICAL 4 CHAMBER): 25.66 CM2
LEFT ATRIUM SIZE: 3.8 CM
LEFT ATRIUM VOLUME INDEX MOD: 51 ML/M2
LEFT ATRIUM VOLUME INDEX: 59 ML/M2
LEFT ATRIUM VOLUME MOD: 97 ML
LEFT ATRIUM VOLUME: 111 CM3
LEFT INTERNAL DIMENSION IN SYSTOLE: 2.6 CM (ref 2.1–4)
LEFT VENTRICLE DIASTOLIC VOLUME INDEX: 38.62 ML/M2
LEFT VENTRICLE DIASTOLIC VOLUME: 73 ML
LEFT VENTRICLE END SYSTOLIC VOLUME APICAL 2 CHAMBER: 116.35 ML
LEFT VENTRICLE END SYSTOLIC VOLUME APICAL 4 CHAMBER: 75.27 ML
LEFT VENTRICLE MASS INDEX: 65.1 G/M2
LEFT VENTRICLE SYSTOLIC VOLUME INDEX: 12.7 ML/M2
LEFT VENTRICLE SYSTOLIC VOLUME: 24 ML
LEFT VENTRICULAR INTERNAL DIMENSION IN DIASTOLE: 4.1 CM (ref 3.5–6)
LEFT VENTRICULAR MASS: 123 G
LV LATERAL E/E' RATIO: 12.5 M/S
LV SEPTAL E/E' RATIO: 15.2 M/S
LVED V (TEICH): 72.78 ML
LVES V (TEICH): 23.95 ML
LVOT MG: 1.4 MMHG
LVOT MV: 0.56 CM/S
LYMPHOCYTES # BLD AUTO: 2.14 K/UL (ref 1–4.8)
MAGNESIUM SERPL-MCNC: 2 MG/DL (ref 1.6–2.6)
MCH RBC QN AUTO: 33.6 PG (ref 27–31)
MCHC RBC AUTO-ENTMCNC: 33.5 G/DL (ref 32–36)
MCV RBC AUTO: 100 FL (ref 82–98)
MV PEAK A VEL: 0.36 M/S
MV PEAK E VEL: 1.37 M/S
MV STENOSIS PRESSURE HALF TIME: 56.4 MS
MV VALVE AREA P 1/2 METHOD: 3.9 CM2
NUCLEATED RBC (/100WBC) (OHS): 0 /100 WBC
OHS CV RV/LV RATIO: 0.78 CM
PHOSPHATE SERPL-MCNC: 3.2 MG/DL (ref 2.7–4.5)
PHOSPHATE SERPL-MCNC: 3.2 MG/DL (ref 2.7–4.5)
PISA TR MAX VEL: 3.4 M/S
PLATELET # BLD AUTO: 202 K/UL (ref 150–450)
PMV BLD AUTO: 9.7 FL (ref 9.2–12.9)
POCT GLUCOSE: 123 MG/DL (ref 70–110)
POCT GLUCOSE: 145 MG/DL (ref 70–110)
POTASSIUM SERPL-SCNC: 3.2 MMOL/L (ref 3.5–5.1)
POTASSIUM SERPL-SCNC: 3.2 MMOL/L (ref 3.5–5.1)
RA MAJOR: 6.43 CM
RA PRESSURE ESTIMATED: 8 MMHG
RA WIDTH: 4.2 CM
RBC # BLD AUTO: 3.45 M/UL (ref 4–5.4)
RELATIVE EOSINOPHIL (OHS): 2.8 %
RELATIVE LYMPHOCYTE (OHS): 26.2 % (ref 18–48)
RELATIVE MONOCYTE (OHS): 10.3 % (ref 4–15)
RELATIVE NEUTROPHIL (OHS): 60 % (ref 38–73)
RIGHT VENTRICLE DIASTOLIC BASEL DIMENSION: 3.2 CM
RIGHT VENTRICULAR END-DIASTOLIC DIMENSION: 3.2 CM
RV TB RVSP: 11 MMHG
SODIUM SERPL-SCNC: 139 MMOL/L (ref 136–145)
SODIUM SERPL-SCNC: 139 MMOL/L (ref 136–145)
STJ: 2.6 CM
TDI LATERAL: 0.11 M/S
TDI SEPTAL: 0.09 M/S
TDI: 0.1 M/S
TR MAX PG: 47 MMHG
TRICUSPID ANNULAR PLANE SYSTOLIC EXCURSION: 1.3 CM
TROPONIN I SERPL DL<=0.01 NG/ML-MCNC: 0.1 NG/ML
TSH SERPL-ACNC: 2.95 UIU/ML (ref 0.4–4)
TV REST PULMONARY ARTERY PRESSURE: 54 MMHG
WBC # BLD AUTO: 8.18 K/UL (ref 3.9–12.7)
Z-SCORE OF LEFT VENTRICULAR DIMENSION IN END DIASTOLE: -2.55
Z-SCORE OF LEFT VENTRICULAR DIMENSION IN END SYSTOLE: -1.79

## 2025-04-27 PROCEDURE — 25000003 PHARM REV CODE 250: Mod: HCNC | Performed by: STUDENT IN AN ORGANIZED HEALTH CARE EDUCATION/TRAINING PROGRAM

## 2025-04-27 PROCEDURE — 85025 COMPLETE CBC W/AUTO DIFF WBC: CPT | Mod: HCNC | Performed by: STUDENT IN AN ORGANIZED HEALTH CARE EDUCATION/TRAINING PROGRAM

## 2025-04-27 PROCEDURE — 25000003 PHARM REV CODE 250: Mod: HCNC | Performed by: NURSE PRACTITIONER

## 2025-04-27 PROCEDURE — 36415 COLL VENOUS BLD VENIPUNCTURE: CPT | Mod: HCNC | Performed by: INTERNAL MEDICINE

## 2025-04-27 PROCEDURE — 63600175 PHARM REV CODE 636 W HCPCS: Mod: HCNC | Performed by: INTERNAL MEDICINE

## 2025-04-27 PROCEDURE — 82310 ASSAY OF CALCIUM: CPT | Mod: HCNC | Performed by: STUDENT IN AN ORGANIZED HEALTH CARE EDUCATION/TRAINING PROGRAM

## 2025-04-27 PROCEDURE — 84100 ASSAY OF PHOSPHORUS: CPT | Mod: HCNC

## 2025-04-27 PROCEDURE — 25000003 PHARM REV CODE 250: Mod: HCNC | Performed by: INTERNAL MEDICINE

## 2025-04-27 PROCEDURE — 82310 ASSAY OF CALCIUM: CPT | Mod: HCNC | Performed by: INTERNAL MEDICINE

## 2025-04-27 PROCEDURE — 84443 ASSAY THYROID STIM HORMONE: CPT | Mod: HCNC

## 2025-04-27 PROCEDURE — 83735 ASSAY OF MAGNESIUM: CPT | Mod: HCNC | Performed by: INTERNAL MEDICINE

## 2025-04-27 PROCEDURE — 94761 N-INVAS EAR/PLS OXIMETRY MLT: CPT | Mod: HCNC

## 2025-04-27 PROCEDURE — 25000003 PHARM REV CODE 250: Mod: HCNC

## 2025-04-27 PROCEDURE — 93010 ELECTROCARDIOGRAM REPORT: CPT | Mod: HCNC,,, | Performed by: INTERNAL MEDICINE

## 2025-04-27 PROCEDURE — 20000000 HC ICU ROOM: Mod: HCNC

## 2025-04-27 PROCEDURE — 99291 CRITICAL CARE FIRST HOUR: CPT | Mod: HCNC,,, | Performed by: INTERNAL MEDICINE

## 2025-04-27 PROCEDURE — 82746 ASSAY OF FOLIC ACID SERUM: CPT | Mod: HCNC | Performed by: INTERNAL MEDICINE

## 2025-04-27 PROCEDURE — 36415 COLL VENOUS BLD VENIPUNCTURE: CPT | Mod: HCNC | Performed by: STUDENT IN AN ORGANIZED HEALTH CARE EDUCATION/TRAINING PROGRAM

## 2025-04-27 PROCEDURE — 93005 ELECTROCARDIOGRAM TRACING: CPT | Mod: HCNC

## 2025-04-27 PROCEDURE — 84484 ASSAY OF TROPONIN QUANT: CPT | Mod: HCNC | Performed by: INTERNAL MEDICINE

## 2025-04-27 RX ORDER — FUROSEMIDE 10 MG/ML
40 INJECTION INTRAMUSCULAR; INTRAVENOUS ONCE
Status: COMPLETED | OUTPATIENT
Start: 2025-04-27 | End: 2025-04-27

## 2025-04-27 RX ORDER — INSULIN ASPART 100 [IU]/ML
0-5 INJECTION, SOLUTION INTRAVENOUS; SUBCUTANEOUS EVERY 6 HOURS PRN
Status: DISCONTINUED | OUTPATIENT
Start: 2025-04-27 | End: 2025-05-02 | Stop reason: HOSPADM

## 2025-04-27 RX ORDER — FAMOTIDINE 20 MG/1
20 TABLET, FILM COATED ORAL DAILY
Status: DISCONTINUED | OUTPATIENT
Start: 2025-04-27 | End: 2025-04-27

## 2025-04-27 RX ORDER — LANOLIN ALCOHOL/MO/W.PET/CERES
400 CREAM (GRAM) TOPICAL ONCE
Status: COMPLETED | OUTPATIENT
Start: 2025-04-27 | End: 2025-04-27

## 2025-04-27 RX ORDER — FAMOTIDINE 20 MG/1
20 TABLET, FILM COATED ORAL EVERY OTHER DAY
Status: DISCONTINUED | OUTPATIENT
Start: 2025-04-27 | End: 2025-05-02 | Stop reason: HOSPADM

## 2025-04-27 RX ORDER — LIDOCAINE 50 MG/G
1 PATCH TOPICAL
Status: DISCONTINUED | OUTPATIENT
Start: 2025-04-27 | End: 2025-05-02 | Stop reason: HOSPADM

## 2025-04-27 RX ORDER — GLUCAGON 1 MG
1 KIT INJECTION
Status: DISCONTINUED | OUTPATIENT
Start: 2025-04-27 | End: 2025-05-02 | Stop reason: HOSPADM

## 2025-04-27 RX ORDER — CHLORHEXIDINE GLUCONATE ORAL RINSE 1.2 MG/ML
15 SOLUTION DENTAL 2 TIMES DAILY
Status: DISCONTINUED | OUTPATIENT
Start: 2025-04-27 | End: 2025-04-28

## 2025-04-27 RX ORDER — MUPIROCIN 20 MG/G
OINTMENT TOPICAL 2 TIMES DAILY
Status: COMPLETED | OUTPATIENT
Start: 2025-04-27 | End: 2025-05-01

## 2025-04-27 RX ADMIN — MUPIROCIN: 20 OINTMENT TOPICAL at 10:04

## 2025-04-27 RX ADMIN — MAGNESIUM OXIDE TAB 400 MG (241.3 MG ELEMENTAL MG) 400 MG: 400 (241.3 MG) TAB at 06:04

## 2025-04-27 RX ADMIN — CHLORHEXIDINE GLUCONATE 0.12% ORAL RINSE 15 ML: 1.2 LIQUID ORAL at 10:04

## 2025-04-27 RX ADMIN — FUROSEMIDE 40 MG: 10 INJECTION, SOLUTION INTRAMUSCULAR; INTRAVENOUS at 12:04

## 2025-04-27 RX ADMIN — ACETAMINOPHEN 650 MG: 325 TABLET ORAL at 02:04

## 2025-04-27 RX ADMIN — ACETAMINOPHEN 650 MG: 325 TABLET ORAL at 12:04

## 2025-04-27 RX ADMIN — LIDOCAINE 5% 1 PATCH: 700 PATCH TOPICAL at 10:04

## 2025-04-27 RX ADMIN — CHLORHEXIDINE GLUCONATE 0.12% ORAL RINSE 15 ML: 1.2 LIQUID ORAL at 08:04

## 2025-04-27 RX ADMIN — ALPRAZOLAM 0.5 MG: 0.5 TABLET ORAL at 08:04

## 2025-04-27 RX ADMIN — FUROSEMIDE 40 MG: 40 TABLET ORAL at 10:04

## 2025-04-27 RX ADMIN — FAMOTIDINE 20 MG: 20 TABLET, FILM COATED ORAL at 10:04

## 2025-04-27 RX ADMIN — POTASSIUM BICARBONATE 60 MEQ: 391 TABLET, EFFERVESCENT ORAL at 06:04

## 2025-04-27 RX ADMIN — MUPIROCIN: 20 OINTMENT TOPICAL at 08:04

## 2025-04-27 RX ADMIN — ACETAMINOPHEN 650 MG: 325 TABLET ORAL at 08:04

## 2025-04-27 NOTE — EICU
"Virtual ICU Admission    Admit Date: 2025  LOS: 1  Code Status: Full Code   : 12/3/1932  Bed: A  17:     Diagnosis: Symptomatic bradycardia    Patient  has a past medical history of Acute on chronic diastolic congestive heart failure, Anemia, Anxiety, Arthritis, Atrial fibrillation with RVR, Back pain, Basal cell carcinoma, Chronic diastolic congestive heart failure, CKD (chronic kidney disease) stage 3, GFR 30-59 ml/min, Colon polyps, Coronary artery calcification, Elevated liver enzymes, Elevated troponin, Fuchs' corneal dystrophy, General anesthetics causing adverse effect in therapeutic use, Glaucoma, Glaucoma, Heart failure with preserved left ventricular function (HFpEF), Hyperlipidemia, Hypertension, Macular degeneration, Medication side effects, Obesity, Pre-diabetes, PVD (peripheral vascular disease), PVD (peripheral vascular disease), Squamous cell carcinoma, Stenosis of carotid artery, Troponin level elevated, Trouble in sleeping, Type 2 diabetes mellitus without complication, without long-term current use of insulin, and Urinary tract infection.    Last VS: /78   Pulse 109   Temp 98.3 °F (36.8 °C) (Oral)   Resp 18   Ht 5' 4" (1.626 m)   Wt 83.5 kg (184 lb 1.4 oz)   LMP  (LMP Unknown)   SpO2 (!) 93%   BMI 31.60 kg/m²       VICU Review: Camera inoperable.                        "

## 2025-04-27 NOTE — SIGNIFICANT EVENT
Pt admitted yesterday for evaluation of symptomatic bradycardia. Per RN, pt had 3.178 second pause at 5:06 pm and 2 second pause at 7:05 pm on 04/27. She is on no AV darryl blocking agents.     Pt had 3.3 second pause at 2304, VSS, pt asymptomatic at the time.     Notified by RN that pt had 5.7 second pause on tele at 1:59 AM, /76. Pt seen and examined at bedside, she is awake, alert, in NAD. Reports dizziness and feeling like she is going to pass out during these episodes. Denies CP, SOB. EKG pending. Obtain stat BMP, mag, trop.     Discussed with ICU team- given prolonged pauses on tele, will transfer to ICU for closer monitoring. Consult cardiology. Transfer orders placed.     Hold heparin ppx and keep NPO for now.

## 2025-04-27 NOTE — NURSING
Informed by monitor tech that pt sustained a 3.3 second pause. Pt AAOx4 w/ no c/o dizziness at this time. Secure chat sent to . [facility] as of yet.

## 2025-04-27 NOTE — ASSESSMENT & PLAN NOTE
Hold Eliquis.    No Lovenox or heparin for DVT prophylaxis.    NPO after midnight.    Last dose of Eliquis was Friday evening.    Plan for dual-chamber pacemaker in a.m..    Continue to hold Bystolic.       Patient has been scheduled for LLQ exploration and possible hernia repair for 4/8/19 to arrive at 6:30am. May take Keppra the morning of. Will need HCG prior. Patient aware.

## 2025-04-27 NOTE — ASSESSMENT & PLAN NOTE
Patient has persistent (7 days or more) atrial fibrillation. Patient is currently fluctuates between sinus rhythm, a-fib, a-flutter. [id number] Score: 5. The patients heart rate in the last 24 hours is as follows:  Pulse  Min: 19  Max: 113     Antiarrhythmics       Anticoagulants       Plan  - Replete lytes with a goal of K>4, Mg >2  - Patient is not anticoagulated due to pending PPM  - Patient's afib is currently controlled  - holding AC, AVN blocking meds

## 2025-04-27 NOTE — PLAN OF CARE
Received pt from [email or web address] [facility] this AM, report taken from REN Grimaldo. Transferred with cardiac monitor. Pt oriented to ICU and placed on continuous monitoring equipment. A flutter with pauses observed on monitor. RT called for STAT 12-Lead EKG. Afebrile. Pt requesting to use BSC; education provided on heightened Fall Risk due to heart condition. Pt refused alternative elimination options (yang, external catheter, bedpan). Orders placed for an indwelling catheter, 3 attempts made, no success. Provider notified and spoke with pt, encouraging safe alternatives to getting out of bed. On RA. Glasses on. Afebrile. 2nd IV placed this AM. Electrolytes replaced this AM, see MAR. POC reviewed with patient, needs reinforcement. Education provided. Currently resting in bed with safety & fall prevention measures in place. Call light in reach. Care ongoing.

## 2025-04-27 NOTE — HPI
Shirley Metz is a 89-year-old female with a PMHx of persistent atrial fibrillation-on Eliquis, Bystolic, chronic diastolic HF, HTN, morbid obesity, glaucoma. Patient admitted to hospital medicine service after presenting to the ED yesterday (4/26) for complaints of dizziness, light headedness, blurry vision, and feeling like she might pass out onset shortly before presentation. When this did not improve, she came to the ER. She has had several ablations with Dr. Kelly and reports her baseline HR is generally 100-110. She was on Metoprolol until approximately 6 weeks ago, but was switched to Bystolic d/t issues with hypotension. She is also on Midodrine prn for hypotension, but states she has not had it in a while. Her last dose of Eliquis was 4/25 and last dose of Bystolic was night of 4/25. She denies other complaints such as f/c, chest pain/discomfort, shortness of breath, orthopnea, peripheral edema, abdominal pain, n/v/d.     In ED, was noted to have some episodes of bradycardia into 30's. ED EKG showed ST w/intermittent PVC's, no STEMI. Potassium 3.4, troponin 0.052, CXR w/naf. She is currently on no AV darryl blocking medications. Last dose SQ heparin pm of 4/26.   Sinus pauses noted since admit:   4/27:  3.178 second pause at 5:06 pm   2 second pause at 7:05 pm   3.3 second pause at 2304, VSS, pt asymptomatic at the time.   4/28:  5.7 second pause on tele at 1:59 AM, /76. Pt seen and examined by [facility] HERCULES. She was awake, alert, in NAD. She did complain of dizziness and feeling like she is going to pass out during these episodes, particularly when supine. Denies CP, SOB. Labs are pending. Repeat EKG showed atrial flutter w/variable conduction w/rate in 60's at time of acquisition. On telemetry, she is variable, mostly in 100's. All beats are generating pulse. She has been made NPO. Anticoagulation now held. She was transferred to ICU for close monitoring due to concerning pauses. Cardiology consult placed  by HM.

## 2025-04-27 NOTE — CONSULTS
[facility] - Intensive Care ([facility])  Critical Care Medicine  Consult Note    Patient Name: Shirley Metz  MRN: [id number]  Admission Date: 4/26/2025  Hospital Length of Stay: 1 days  Code Status: Full Code  Attending Physician: Rachael Lee MD   Primary Care Provider: Phylicia Deleon MD   Principal Problem: Symptomatic bradycardia    Inpatient consult to Critical Care Medicine  Consult performed by: Leslie Duff NP  Consult ordered by: Krystal Burdick MD        Subjective:     HPI:  Shirley Metz is a 89-year-old female with a PMHx of persistent atrial fibrillation-on Eliquis, Bystolic, chronic diastolic HF, HTN, morbid obesity, glaucoma. Patient admitted to hospital medicine service after presenting to the ED yesterday (4/26) for complaints of dizziness, light headedness, blurry vision, and feeling like she might pass out onset shortly before presentation. When this did not improve, she came to the ER. She has had several ablations with Dr. Kelly and reports her baseline HR is generally 100-110. She was on Metoprolol until approximately 6 weeks ago, but was switched to Bystolic d/t issues with hypotension. She is also on Midodrine prn for hypotension, but states she has not had it in a while. Her last dose of Eliquis was 4/25 and last dose of Bystolic was night of 4/25. She denies other complaints such as f/c, chest pain/discomfort, shortness of breath, orthopnea, peripheral edema, abdominal pain, n/v/d.     In ED, was noted to have some episodes of bradycardia into 30's. ED EKG showed ST w/intermittent PVC's, no STEMI. Potassium 3.4, troponin 0.052, CXR w/naf. She is currently on no AV darryl blocking medications. Last dose SQ heparin pm of 4/26.   Sinus pauses noted since admit:   4/27:  3.178 second pause at 5:06 pm   2 second pause at 7:05 pm   3.3 second pause at 2304, VSS, pt asymptomatic at the time.   4/28:  5.7 second pause on tele at 1:59 AM, /76. Pt seen and examined by  [facility]. She was awake, alert, in NAD. She did complain of dizziness and feeling like she is going to pass out during these episodes, particularly when supine. Denies CP, SOB. Labs are pending. Repeat EKG showed atrial flutter w/variable conduction w/rate in 60's at time of acquisition. On telemetry, she is variable, mostly in 100's. All beats are generating pulse. She has been made NPO. Anticoagulation now held. She was transferred to ICU for close monitoring due to concerning pauses. Cardiology consult placed by PER[facility].       Hospital/ICU Course:  No notes on file    Past Medical History:   Diagnosis Date    Acute on chronic diastolic congestive heart failure 01/11/2023    Anemia 11/29/2018    Anxiety 11/28/2017    Arthritis     Atrial fibrillation with RVR 10/09/2019    Back pain     Basal cell carcinoma 03/29/2018    left mid back    Chronic diastolic congestive heart failure 10/15/2019    CKD (chronic kidney disease) stage 3, GFR 30-59 ml/min 08/08/2016    Colon polyps     reported per patient.     Coronary artery calcification 10/05/2021    Elevated liver enzymes 12/20/2019    Elevated troponin 03/15/2018    Fuchs' corneal dystrophy     General anesthetics causing adverse effect in therapeutic use     woke up during surgery and gagged on ET tube    Glaucoma     Glaucoma     Heart failure with preserved left ventricular function (HFpEF) 07/24/2018    Hyperlipidemia     Hypertension     Macular degeneration dry    Medication side effects 05/03/2017    Obesity     Pre-diabetes 12/04/2017    PVD (peripheral vascular disease) 04/26/2021    PVD (peripheral vascular disease) 04/26/2021    Squamous cell carcinoma 01/08/2019    left shoulder    Stenosis of carotid artery 10/05/2021    Troponin level elevated 01/11/2023    Trouble in sleeping     Type 2 diabetes mellitus without complication, without long-term current use of insulin 02/24/2021    Urinary tract infection        Past Surgical History:   Procedure Laterality  Date    ABLATION OF ARRHYTHMOGENIC FOCUS FOR ATRIAL FIBRILLATION N/A 11/03/2022    Procedure: Ablation atrial fibrillation;  Surgeon: Toi Kelly MD;  Location: [facility] EP LAB;  Service: Cardiology;  Laterality: N/A;  afib, PVI/CTI, RFA, STACEY (cx if SR), DEDE, anes, MB, 3 Prep    ADENOIDECTOMY  1938    BREAST BIOPSY Left     1997. benign    BREAST CYST EXCISION Left 1997 approx    CARPAL TUNNEL RELEASE Right 2012 approx    CATARACT EXTRACTION Bilateral 1994    CHOLECYSTECTOMY  1975    CORNEAL TRANSPLANT Bilateral 08/2015 8/2015 - left; Right 4/2016    EYE SURGERY  12/06/2023    Fuch's Corneal dystrophy - had 2nd operation with Dr. Quintanilla    EYE SURGERY      Cataract wIOL    left thumb Left 2011    removed cuboid for arthritis    REVERSE TOTAL SHOULDER ARTHROPLASTY Left 08/21/2018    Procedure: ARTHROPLASTY, SHOULDER, TOTAL, REVERSE;  Surgeon: Solomon Lujan MD;  Location: [facility] OR;  Service: Orthopedics;  Laterality: Left;  WITH BICEP TENODESIS    SKIN CANCER EXCISION Left 2017    BCC removal by Dr. Valadez    SLT- OS (aka TRABECULOPLASTY) Left 05/11/2011    repeat 3/14/12    TENOTOMY Left 08/21/2018    Procedure: TENOTOMY;  Surgeon: Solomon Lujan MD;  Location: [facility] OR;  Service: Orthopedics;  Laterality: Left;    TONSILLECTOMY  1938    TRANSESOPHAGEAL ECHOCARDIOGRAM WITH POSSIBLE CARDIOVERSION (STACEY W/ POSS CARDIOVERSION) N/A 03/21/2023    Procedure: Transesophageal echo (STACEY) intra-procedure log documentation;  Surgeon: Trevon Rmairez MD;  Location: [facility] CATH LAB;  Service: Cardiology;  Laterality: N/A;    TREATMENT OF CARDIAC ARRHYTHMIA N/A 10/10/2019    Procedure: CARDIOVERSION;  Surgeon: Pete Durán MD;  Location: [facility] CATH LAB;  Service: Cardiology;  Laterality: N/A;    TREATMENT OF CARDIAC ARRHYTHMIA N/A 12/06/2019    Procedure: CARDIOVERSION;  Surgeon: Samy Castillo MD;  Location: [facility] CATH LAB;  Service: Cardiology;  Laterality: N/A;       Review of patient's allergies indicates:   Allergen  Reactions    Carvedilol Swelling     Lip swelling    Cephalexin Other (See Comments)     Muscle/joint weakness unable to move     Doxycycline Shortness Of Breath, Nausea And Vomiting and Other (See Comments)     weakness    Erythromycin Other (See Comments)     Complete weakness/could not walk    Gadolinium-containing contrast media Swelling     angioedema    Hydrocodone-acetaminophen Shortness Of Breath     wheezing    Labetalol Shortness Of Breath, Nausea Only, Palpitations and Other (See Comments)     weakness    Loratadine Other (See Comments)     Double vision/spots  Other reaction(s): double vision    Losartan Other (See Comments)     weakness    Loteprednol etabonate Other (See Comments), Palpitations, Anxiety and Shortness Of Breath     Patient stated bp went up and extreme muscle weakness    Naproxen      wheezing  wheezing  wheezing  Other reaction(s): wheezing    Prednisone Other (See Comments)     Myalgias and generalized weakness, unable to walk    Statins-hmg-coa reductase inhibitors Other (See Comments)     Severe Muscle weakness  Muscle weakness  Severe Muscle weakness  Other reaction(s): severe muscle weakness    Amlodipine Other (See Comments)     Myalgias    Fenofibrate Other (See Comments)     muscle weakness      Hydralazine Other (See Comments)     muscle tremors    Loteprednol Other (See Comments)     increased BP    Macrolide antibiotics Other (See Comments)     Complete weakness/could not walk      Moxifloxacin Hives and Other (See Comments)     muscle soreness    Timolol Other (See Comments)     Blurred vision, Burning eyes, Severe muscle weakness, eye problems.      Verapamil Diarrhea     Severe diarrhea.      Hydrocortisone     Isosorbide      Tolerates Imdur    Silver sulfadiaz-foam bandage     Tetanus and diphtheria toxoids     Tetanus vaccines and toxoid     Adhesive Rash     Clonidine patch--contact dermatitis    Codeine Diarrhea and Other (See Comments)     Loss of balance        Doxazosin Palpitations    Fexofenadine Other (See Comments)     Double vision/spots       Hydrocortisone (bulk) Other (See Comments)     Only suppository/ caused muscle weakness    Latanoprost Other (See Comments)     Muscle weakness      Olopatadine Other (See Comments)     Muscle weakness         Family History       Problem Relation (Age of Onset)    Cancer Father (88)    Heart disease Mother    Hypertension Mother          Tobacco Use    Smoking status: Never    Smokeless tobacco: Never    Tobacco comments:     history of passive smoking from her ex-   Substance and Sexual Activity    Alcohol use: Yes     Alcohol/week: 2.0 standard drinks of alcohol     Types: 2 Standard drinks or equivalent per week     Comment: occ    Drug use: No    Sexual activity: Not Currently     Partners: Male     Birth control/protection: Post-menopausal     Comment: discussed protection for STD's         Review of Systems   Constitutional: Negative.    HENT: Negative.     Eyes: Negative.    Respiratory: Negative.     Cardiovascular: Negative.    Gastrointestinal: Negative.    Genitourinary: Negative.    Musculoskeletal: Negative.    Skin: Negative.    Neurological:  Positive for dizziness and light-headedness.   Hematological: Negative.    Psychiatric/Behavioral: Negative.       Objective:     Vital Signs (Most Recent):  Temp: 97.8 °F (36.6 °C) (04/27/25 0304)  Pulse: 109 (04/27/25 0304)  Resp: 18 (04/27/25 0304)  BP: 115/76 (04/27/25 0304)  SpO2: (!) 93 % (04/27/25 0304) Vital Signs (24h Range):  Temp:  [97.1 °F (36.2 °C)-98.2 °F (36.8 °C)] 97.8 °F (36.6 °C)  Pulse:  [] 109  Resp:  [15-20] 18  SpO2:  [93 %-99 %] 93 %  BP: (113-155)/(60-76) 115/76     Weight: 83.5 kg (184 lb 1.4 oz)  Body mass index is 31.6 kg/m².      Intake/Output Summary (Last 24 hours) at 4/27/2025 0335  Last data filed at 4/26/2025 1603  Gross per 24 hour   Intake --   Output 300 ml   Net -300 ml        Physical Exam  Vitals and nursing note reviewed.    Constitutional:       General: She is awake.      Appearance: She is obese.   HENT:      Head: Normocephalic.      Mouth/Throat:      Mouth: Mucous membranes are moist.      Pharynx: Oropharynx is clear.   Eyes:      Extraocular Movements: Extraocular movements intact.      Pupils: Pupils are equal, round, and reactive to light.   Cardiovascular:      Rate and Rhythm: Tachycardia present. Rhythm irregular.      Pulses: Normal pulses.      Heart sounds: Normal heart sounds.   Pulmonary:      Effort: Pulmonary effort is normal.      Breath sounds: Normal breath sounds.   Abdominal:      General: Bowel sounds are normal.      Palpations: Abdomen is soft.      Tenderness: There is no abdominal tenderness.   Musculoskeletal:         General: Normal range of motion.      Cervical back: Normal range of motion and neck supple.   Skin:     General: Skin is warm and dry.      Capillary Refill: Capillary refill takes less than 2 seconds.   Neurological:      General: No focal deficit present.      Mental Status: She is alert and oriented to person, place, and time.   Psychiatric:         Behavior: Behavior is cooperative.          Vents:       Lines/Drains/Airways       Peripheral Intravenous Line  Duration                  Peripheral IV - Single Lumen 04/26/25 1336 18 G Left Antecubital <1 day                    Significant Labs:    CBC/Anemia Profile:  Recent Labs   Lab 04/26/25  1352   WBC 7.44   HGB 12.5   HCT 38.1      *   RDW 13.2        Chemistries:  Recent Labs   Lab 04/26/25  1352      K 3.4*      CO2 28   BUN 47*   CREATININE 1.8*   CALCIUM 9.5   ALBUMIN 3.9   BILITOT 0.6   ALKPHOS 58   ALT 17   AST 22   GLUCOSE 183*       Lactic Acid:   Recent Labs   Lab 04/26/25  1358 04/26/25  1848   LACTATE 1.4 1.9     Troponin:   Recent Labs   Lab 04/26/25  1352   TROPONINI 0.052*   BNP  Recent Labs   Lab 04/26/25  1352   *       All pertinent labs within the past 24 hours have been  "reviewed.    Significant Imaging:   I have reviewed all pertinent imaging results/findings within the past 24 hours.    ABG  No results for input(s): "PH", "PO2", "PCO2", "HCO3", "BE" in the last 168 hours.  Assessment/Plan:     Ophtho  COAG (chronic open-angle glaucoma) - Both Eyes  - continue home ophthalmic drops (non-BB)      Cardiac/Vascular  * Symptomatic bradycardia  - rate variable, noted pauses on telemetry  - holding anticoagulation  - labs in process, replace electrolytes as indicated  - TSH pending  - continuous telemetry  - echo ordered  - cardiology consult placed  - NPO pending cards eval    Persistent atrial fibrillation  Patient has persistent (7 days or more) atrial fibrillation. Patient is currently fluctuates between sinus rhythm, a-fib, a-flutter. [id number] Score: 5. The patients heart rate in the last 24 hours is as follows:  Pulse  Min: 19  Max: 113     Antiarrhythmics       Anticoagulants       Plan  - Replete lytes with a goal of K>4, Mg >2  - Patient is not anticoagulated due to pending PPM  - Patient's afib is currently controlled  - holding AC, AVN blocking meds        Chronic diastolic CHF (congestive heart failure)  Patient has Diastolic (HFpEF) heart failure that is Chronic. On presentation their CHF was well compensated. Most recent BNP and echo results are listed below.  Recent Labs     04/26/25  1352   *     Latest ECHO  Results for orders placed during the hospital encounter of 07/11/22    Echo    Interpretation Summary  · Concentric remodeling and normal systolic function.  · The estimated ejection fraction is 60%.  · Normal left ventricular diastolic function.  · Normal right ventricular size with normal right ventricular systolic function.    Current Heart Failure Medications  furosemide tablet 40 mg, Daily, Oral  furosemide tablet 20 mg, Nightly, Oral    Plan  - Monitor strict I&Os and daily weights.    - Place on telemetry  - Low sodium diet  - Place on fluid " restriction of 1.5 L.   - Cardiology has been consulted  - The patient's volume status is at their baseline  - continue home diuretics as tolerated  - electrolyte monitoring        HTN (hypertension)  Patient's blood pressure range in the last 24 hours was: BP  Min: 110/71  Max: 155/60.The patient's inpatient anti-hypertensive regimen is listed below:  Current Antihypertensives  furosemide tablet 40 mg, Daily, Oral  furosemide tablet 20 mg, Nightly, Oral    Plan  - BP is controlled, no changes needed to their regimen  - no AVN blocking meds  - will utilize prn IV medications if needed    Endocrine  Type 2 diabetes mellitus with chronic kidney disease, without long-term current use of insulin  Creatine stable for now. BMP reviewed- noted Estimated Creatinine Clearance: 22.1 mL/min (A) (based on SCr of 1.7 mg/dL (H)). according to latest data. 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  Monitor UOP and serial BMP and adjust therapy as needed. Renally dose meds. Avoid nephrotoxic medications and procedures.    - SSI with scheduled glucose checks  - check A1c    Obesity (BMI 30.0-34.9)  Body mass index is 31.6 kg/m². Morbid obesity complicates all aspects of disease management from diagnostic modalities to treatment. Weight loss encouraged and health benefits explained to patient.           Critical Care Daily Checklist:    A: Awake: RASS Goal/Actual Goal:    Actual:     B: Spontaneous Breathing Trial Performed?     C: SAT & SBT Coordinated?  N/a                      D: Delirium: CAM-ICU     E: Early Mobility Performed? Yes   F: Feeding Goal:    Status:     Current Diet Order   Procedures    Diet NPO      AS: Analgesia/Sedation N/a   T: Thromboembolic Prophylaxis SCD   H: HOB > 300 Yes   U: Stress Ulcer Prophylaxis (if needed) pepcid   G: Glucose Control SSI   B: Bowel Function     I: Indwelling Catheter (Lines & Reyes) Necessity reviewed   D: De-escalation of Antimicrobials/Pharmacotherapies N/a    Plan  for the day/ETD Transfer to ICU    Code Status:  Family/Goals of Care: Full Code  TBD     Critical Care Time: 45 minutes  Critical secondary to symptomatic bradycardia with sinus pauses. Patient has a condition that poses threat to life and bodily function.      Critical care was time spent personally by me on the following activities: development of treatment plan with patient or surrogate and bedside caregivers, discussions with consultants, evaluation of patient's response to treatment, examination of patient, ordering and performing treatments and interventions, ordering and review of laboratory studies, ordering and review of radiographic studies, pulse oximetry, re-evaluation of patient's condition. This critical care time did not overlap with that of any other provider or involve time for any procedures.    Thank you for your consult. I will follow-up with patient. Please contact us if you have any additional questions.     Leslie Duff NP  Critical Care Medicine  [facility] - Intensive Care ([facility]

## 2025-04-27 NOTE — SUBJECTIVE & OBJECTIVE
Past Medical History:   Diagnosis Date    Acute on chronic diastolic congestive heart failure 01/11/2023    Anemia 11/29/2018    Anxiety 11/28/2017    Arthritis     Atrial fibrillation with RVR 10/09/2019    Back pain     Basal cell carcinoma 03/29/2018    left mid back    Chronic diastolic congestive heart failure 10/15/2019    CKD (chronic kidney disease) stage 3, GFR 30-59 ml/min 08/08/2016    Colon polyps     reported per patient.     Coronary artery calcification 10/05/2021    Elevated liver enzymes 12/20/2019    Elevated troponin 03/15/2018    Fuchs' corneal dystrophy     General anesthetics causing adverse effect in therapeutic use     woke up during surgery and gagged on ET tube    Glaucoma     Glaucoma     Heart failure with preserved left ventricular function (HFpEF) 07/24/2018    Hyperlipidemia     Hypertension     Macular degeneration dry    Medication side effects 05/03/2017    Obesity     Pre-diabetes 12/04/2017    PVD (peripheral vascular disease) 04/26/2021    PVD (peripheral vascular disease) 04/26/2021    Squamous cell carcinoma 01/08/2019    left shoulder    Stenosis of carotid artery 10/05/2021    Troponin level elevated 01/11/2023    Trouble in sleeping     Type 2 diabetes mellitus without complication, without long-term current use of insulin 02/24/2021    Urinary tract infection        Past Surgical History:   Procedure Laterality Date    ABLATION OF ARRHYTHMOGENIC FOCUS FOR ATRIAL FIBRILLATION N/A 11/03/2022    Procedure: Ablation atrial fibrillation;  Surgeon: Toi Kelly MD;  Location: [facility];  Service: Cardiology;  Laterality: N/A;  afib, PVI/CTI, RFA, STACEY (cx if SR), DEDE, anes, MB, 3 Prep    ADENOIDECTOMY  1938    BREAST BIOPSY Left     1997. benign    BREAST CYST EXCISION Left 1997 approx    CARPAL TUNNEL RELEASE Right 2012 approx    CATARACT EXTRACTION Bilateral 1994    CHOLECYSTECTOMY  1975    CORNEAL TRANSPLANT Bilateral 08/2015 8/2015 - left; Right 4/2016    EYE  SURGERY  12/06/2023    Fuch's Corneal dystrophy - had 2nd operation with Dr. Quintanilla    EYE SURGERY      Cataract wIOL    left thumb Left 2011    removed cuboid for arthritis    REVERSE TOTAL SHOULDER ARTHROPLASTY Left 08/21/2018    Procedure: ARTHROPLASTY, SHOULDER, TOTAL, REVERSE;  Surgeon: Solomon Lujan MD;  Location: [facility] OR;  Service: Orthopedics;  Laterality: Left;  WITH BICEP TENODESIS    SKIN CANCER EXCISION Left 2017    BCC removal by Dr. Valadez    SLT- OS (aka TRABECULOPLASTY) Left 05/11/2011    repeat 3/14/12    TENOTOMY Left 08/21/2018    Procedure: TENOTOMY;  Surgeon: Solomon Lujan MD;  Location: [facility] OR;  Service: Orthopedics;  Laterality: Left;    TONSILLECTOMY  1938    TRANSESOPHAGEAL ECHOCARDIOGRAM WITH POSSIBLE CARDIOVERSION (STACEY W/ POSS CARDIOVERSION) N/A 03/21/2023    Procedure: Transesophageal echo (STACEY) intra-procedure log documentation;  Surgeon: Trevon Ramirez MD;  Location: [facility] CATH LAB;  Service: Cardiology;  Laterality: N/A;    TREATMENT OF CARDIAC ARRHYTHMIA N/A 10/10/2019    Procedure: CARDIOVERSION;  Surgeon: Pete Durán MD;  Location: [facility] CATH LAB;  Service: Cardiology;  Laterality: N/A;    TREATMENT OF CARDIAC ARRHYTHMIA N/A 12/06/2019    Procedure: CARDIOVERSION;  Surgeon: Samy Castillo MD;  Location: [facility] CATH LAB;  Service: Cardiology;  Laterality: N/A;       Review of patient's allergies indicates:   Allergen Reactions    Carvedilol Swelling     Lip swelling    Cephalexin Other (See Comments)     Muscle/joint weakness unable to move     Doxycycline Shortness Of Breath, Nausea And Vomiting and Other (See Comments)     weakness    Erythromycin Other (See Comments)     Complete weakness/could not walk    Gadolinium-containing contrast media Swelling     angioedema    Hydrocodone-acetaminophen Shortness Of Breath     wheezing    Labetalol Shortness Of Breath, Nausea Only, Palpitations and Other (See Comments)     weakness    Loratadine Other (See Comments)     Double  vision/spots  Other reaction(s): double vision    Losartan Other (See Comments)     weakness    Loteprednol etabonate Other (See Comments), Palpitations, Anxiety and Shortness Of Breath     Patient stated bp went up and extreme muscle weakness    Naproxen      wheezing  wheezing  wheezing  Other reaction(s): wheezing    Prednisone Other (See Comments)     Myalgias and generalized weakness, unable to walk    Statins-hmg-coa reductase inhibitors Other (See Comments)     Severe Muscle weakness  Muscle weakness  Severe Muscle weakness  Other reaction(s): severe muscle weakness    Amlodipine Other (See Comments)     Myalgias    Fenofibrate Other (See Comments)     muscle weakness      Hydralazine Other (See Comments)     muscle tremors    Loteprednol Other (See Comments)     increased BP    Macrolide antibiotics Other (See Comments)     Complete weakness/could not walk      Moxifloxacin Hives and Other (See Comments)     muscle soreness    Timolol Other (See Comments)     Blurred vision, Burning eyes, Severe muscle weakness, eye problems.      Verapamil Diarrhea     Severe diarrhea.      Hydrocortisone     Isosorbide      Tolerates Imdur    Silver sulfadiaz-foam bandage     Tetanus and diphtheria toxoids     Tetanus vaccines and toxoid     Adhesive Rash     Clonidine patch--contact dermatitis    Codeine Diarrhea and Other (See Comments)     Loss of balance       Doxazosin Palpitations    Fexofenadine Other (See Comments)     Double vision/spots       Hydrocortisone (bulk) Other (See Comments)     Only suppository/ caused muscle weakness    Latanoprost Other (See Comments)     Muscle weakness      Olopatadine Other (See Comments)     Muscle weakness         Family History       Problem Relation (Age of Onset)    Cancer Father (88)    Heart disease Mother    Hypertension Mother          Tobacco Use    Smoking status: Never    Smokeless tobacco: Never    Tobacco comments:     history of passive smoking from her ex-    Substance and Sexual Activity    Alcohol use: Yes     Alcohol/week: 2.0 standard drinks of alcohol     Types: 2 Standard drinks or equivalent per week     Comment: occ    Drug use: No    Sexual activity: Not Currently     Partners: Male     Birth control/protection: Post-menopausal     Comment: discussed protection for STD's         Review of Systems   Constitutional: Negative.    HENT: Negative.     Eyes: Negative.    Respiratory: Negative.     Cardiovascular: Negative.    Gastrointestinal: Negative.    Genitourinary: Negative.    Musculoskeletal: Negative.    Skin: Negative.    Neurological:  Positive for dizziness and light-headedness.   Hematological: Negative.    Psychiatric/Behavioral: Negative.       Objective:     Vital Signs (Most Recent):  Temp: 97.8 °F (36.6 °C) (04/27/25 0304)  Pulse: 109 (04/27/25 0304)  Resp: 18 (04/27/25 0304)  BP: 115/76 (04/27/25 0304)  SpO2: (!) 93 % (04/27/25 0304) Vital Signs (24h Range):  Temp:  [97.1 °F (36.2 °C)-98.2 °F (36.8 °C)] 97.8 °F (36.6 °C)  Pulse:  [] 109  Resp:  [15-20] 18  SpO2:  [93 %-99 %] 93 %  BP: (113-155)/(60-76) 115/76     Weight: 83.5 kg (184 lb 1.4 oz)  Body mass index is 31.6 kg/m².      Intake/Output Summary (Last 24 hours) at 4/27/2025 0335  Last data filed at 4/26/2025 1603  Gross per 24 hour   Intake --   Output 300 ml   Net -300 ml        Physical Exam  Vitals and nursing note reviewed.   Constitutional:       General: She is awake.      Appearance: She is obese.   HENT:      Head: Normocephalic.      Mouth/Throat:      Mouth: Mucous membranes are moist.      Pharynx: Oropharynx is clear.   Eyes:      Extraocular Movements: Extraocular movements intact.      Pupils: Pupils are equal, round, and reactive to light.   Cardiovascular:      Rate and Rhythm: Tachycardia present. Rhythm irregular.      Pulses: Normal pulses.      Heart sounds: Normal heart sounds.   Pulmonary:      Effort: Pulmonary effort is normal.      Breath sounds: Normal breath  sounds.   Abdominal:      General: Bowel sounds are normal.      Palpations: Abdomen is soft.      Tenderness: There is no abdominal tenderness.   Musculoskeletal:         General: Normal range of motion.      Cervical back: Normal range of motion and neck supple.   Skin:     General: Skin is warm and dry.      Capillary Refill: Capillary refill takes less than 2 seconds.   Neurological:      General: No focal deficit present.      Mental Status: She is alert and oriented to person, place, and time.   Psychiatric:         Behavior: Behavior is cooperative.          Vents:       Lines/Drains/Airways       Peripheral Intravenous Line  Duration                  Peripheral IV - Single Lumen 04/26/25 1336 18 G Left Antecubital <1 day                    Significant Labs:    CBC/Anemia Profile:  Recent Labs   Lab 04/26/25  1352   WBC 7.44   HGB 12.5   HCT 38.1      *   RDW 13.2        Chemistries:  Recent Labs   Lab 04/26/25  1352      K 3.4*      CO2 28   BUN 47*   CREATININE 1.8*   CALCIUM 9.5   ALBUMIN 3.9   BILITOT 0.6   ALKPHOS 58   ALT 17   AST 22   GLUCOSE 183*       Lactic Acid:   Recent Labs   Lab 04/26/25  1358 04/26/25  1848   LACTATE 1.4 1.9     Troponin:   Recent Labs   Lab 04/26/25  1352   TROPONINI 0.052*   BNP  Recent Labs   Lab 04/26/25  1352   *       All pertinent labs within the past 24 hours have been reviewed.    Significant Imaging:   I have reviewed all pertinent imaging results/findings within the past 24 hours.

## 2025-04-27 NOTE — SUBJECTIVE & OBJECTIVE
Past Medical History:   Diagnosis Date    Acute on chronic diastolic congestive heart failure 01/11/2023    Anemia 11/29/2018    Anxiety 11/28/2017    Arthritis     Atrial fibrillation with RVR 10/09/2019    Back pain     Basal cell carcinoma 03/29/2018    left mid back    Chronic diastolic congestive heart failure 10/15/2019    CKD (chronic kidney disease) stage 3, GFR 30-59 ml/min 08/08/2016    Colon polyps     reported per patient.     Coronary artery calcification 10/05/2021    Elevated liver enzymes 12/20/2019    Elevated troponin 03/15/2018    Fuchs' corneal dystrophy     General anesthetics causing adverse effect in therapeutic use     woke up during surgery and gagged on ET tube    Glaucoma     Glaucoma     Heart failure with preserved left ventricular function (HFpEF) 07/24/2018    Hyperlipidemia     Hypertension     Macular degeneration dry    Medication side effects 05/03/2017    Obesity     Pre-diabetes 12/04/2017    PVD (peripheral vascular disease) 04/26/2021    PVD (peripheral vascular disease) 04/26/2021    Squamous cell carcinoma 01/08/2019    left shoulder    Stenosis of carotid artery 10/05/2021    Troponin level elevated 01/11/2023    Trouble in sleeping     Type 2 diabetes mellitus without complication, without long-term current use of insulin 02/24/2021    Urinary tract infection        Past Surgical History:   Procedure Laterality Date    ABLATION OF ARRHYTHMOGENIC FOCUS FOR ATRIAL FIBRILLATION N/A 11/03/2022    Procedure: Ablation atrial fibrillation;  Surgeon: Toi Kelly MD;  Location: [facility];  Service: Cardiology;  Laterality: N/A;  afib, PVI/CTI, RFA, STACEY (cx if SR), DEDE, anes, MB, 3 Prep    ADENOIDECTOMY  1938    BREAST BIOPSY Left     1997. benign    BREAST CYST EXCISION Left 1997 approx    CARPAL TUNNEL RELEASE Right 2012 approx    CATARACT EXTRACTION Bilateral 1994    CHOLECYSTECTOMY  1975    CORNEAL TRANSPLANT Bilateral 08/2015 8/2015 - left; Right 4/2016    EYE  SURGERY  12/06/2023    Fuch's Corneal dystrophy - had 2nd operation with Dr. Quintanilla    EYE SURGERY      Cataract wIOL    left thumb Left 2011    removed cuboid for arthritis    REVERSE TOTAL SHOULDER ARTHROPLASTY Left 08/21/2018    Procedure: ARTHROPLASTY, SHOULDER, TOTAL, REVERSE;  Surgeon: Solomon Lujan MD;  Location: [facility] OR;  Service: Orthopedics;  Laterality: Left;  WITH BICEP TENODESIS    SKIN CANCER EXCISION Left 2017    BCC removal by Dr. Valadez    SLT- OS (aka TRABECULOPLASTY) Left 05/11/2011    repeat 3/14/12    TENOTOMY Left 08/21/2018    Procedure: TENOTOMY;  Surgeon: Solomon Lujan MD;  Location: [facility] OR;  Service: Orthopedics;  Laterality: Left;    TONSILLECTOMY  1938    TRANSESOPHAGEAL ECHOCARDIOGRAM WITH POSSIBLE CARDIOVERSION (STACEY W/ POSS CARDIOVERSION) N/A 03/21/2023    Procedure: Transesophageal echo (STACEY) intra-procedure log documentation;  Surgeon: Trevon Ramirez MD;  Location: [facility] CATH LAB;  Service: Cardiology;  Laterality: N/A;    TREATMENT OF CARDIAC ARRHYTHMIA N/A 10/10/2019    Procedure: CARDIOVERSION;  Surgeon: Pete Durán MD;  Location: [facility] CATH LAB;  Service: Cardiology;  Laterality: N/A;    TREATMENT OF CARDIAC ARRHYTHMIA N/A 12/06/2019    Procedure: CARDIOVERSION;  Surgeon: Samy Castillo MD;  Location: [facility] CATH LAB;  Service: Cardiology;  Laterality: N/A;       Review of patient's allergies indicates:   Allergen Reactions    Carvedilol Swelling     Lip swelling    Cephalexin Other (See Comments)     Muscle/joint weakness unable to move     Doxycycline Shortness Of Breath, Nausea And Vomiting and Other (See Comments)     weakness    Erythromycin Other (See Comments)     Complete weakness/could not walk    Gadolinium-containing contrast media Swelling     angioedema    Hydrocodone-acetaminophen Shortness Of Breath     wheezing    Labetalol Shortness Of Breath, Nausea Only, Palpitations and Other (See Comments)     weakness    Loratadine Other (See Comments)     Double  vision/spots  Other reaction(s): double vision    Losartan Other (See Comments)     weakness    Loteprednol etabonate Other (See Comments), Palpitations, Anxiety and Shortness Of Breath     Patient stated bp went up and extreme muscle weakness    Naproxen      wheezing  wheezing  wheezing  Other reaction(s): wheezing    Prednisone Other (See Comments)     Myalgias and generalized weakness, unable to walk    Statins-hmg-coa reductase inhibitors Other (See Comments)     Severe Muscle weakness  Muscle weakness  Severe Muscle weakness  Other reaction(s): severe muscle weakness    Amlodipine Other (See Comments)     Myalgias    Fenofibrate Other (See Comments)     muscle weakness      Hydralazine Other (See Comments)     muscle tremors    Loteprednol Other (See Comments)     increased BP    Macrolide antibiotics Other (See Comments)     Complete weakness/could not walk      Moxifloxacin Hives and Other (See Comments)     muscle soreness    Timolol Other (See Comments)     Blurred vision, Burning eyes, Severe muscle weakness, eye problems.      Verapamil Diarrhea     Severe diarrhea.      Hydrocortisone     Isosorbide      Tolerates Imdur    Silver sulfadiaz-foam bandage     Tetanus and diphtheria toxoids     Tetanus vaccines and toxoid     Adhesive Rash     Clonidine patch--contact dermatitis    Codeine Diarrhea and Other (See Comments)     Loss of balance       Doxazosin Palpitations    Fexofenadine Other (See Comments)     Double vision/spots       Hydrocortisone (bulk) Other (See Comments)     Only suppository/ caused muscle weakness    Latanoprost Other (See Comments)     Muscle weakness      Olopatadine Other (See Comments)     Muscle weakness         No current facility-administered medications on file prior to encounter.     Current Outpatient Medications on File Prior to Encounter   Medication Sig    acetaminophen (TYLENOL) 500 MG tablet Take 500 mg by mouth nightly as needed.    ALPRAZolam (XANAX) 0.5 MG tablet  Take 1 tablet (0.5 mg total) by mouth nightly as needed for Anxiety.    b complex vitamins capsule Take 1 capsule by mouth once daily.    calcium carbonate/vitamin D3 (VITAMIN D-3 ORAL) Take 4,000 Int'l Units/day by mouth once daily.    ELIQUIS 2.5 mg Tab TAKE 1 TABLET (2.5 MG TOTAL) BY MOUTH 2 (TWO) TIMES DAILY.    furosemide (LASIX) 40 MG tablet Take 1 tab in am and 1/2 tab in evening    latanoprostene bunod (VYZULTA) 0.024 % Drop Place 1 drop into both eyes every evening.    midodrine (PROAMATINE) 2.5 MG Tab Take 1 tablet (2.5 mg total) by mouth 3 (three) times daily.    nebivoloL (BYSTOLIC) 5 MG Tab Take 1 tablet (5 mg total) by mouth once daily.    [DISCONTINUED] cloNIDine (CATAPRES) 0.1 MG tablet Take 1 tablet (0.1 mg total) by mouth 2 (two) times daily as needed (systolic BP over 180).    [DISCONTINUED] isosorbide mononitrate (IMDUR) 30 MG 24 hr tablet Take 1 tablet (30 mg total) by mouth every evening.     Family History       Problem Relation (Age of Onset)    Cancer Father (88)    Heart disease Mother    Hypertension Mother          Tobacco Use    Smoking status: Never    Smokeless tobacco: Never    Tobacco comments:     history of passive smoking from her ex-   Substance and Sexual Activity    Alcohol use: Yes     Alcohol/week: 2.0 standard drinks of alcohol     Types: 2 Standard drinks or equivalent per week     Comment: occ    Drug use: No    Sexual activity: Not Currently     Partners: Male     Birth control/protection: Post-menopausal     Comment: discussed protection for STD's     Review of Systems   Cardiovascular:  Negative for chest pain, dyspnea on exertion, palpitations and syncope.   Neurological:  Negative for focal weakness.     Objective:     Vital Signs (Most Recent):  Temp: 98.3 °F (36.8 °C) (04/27/25 0356)  Pulse: 105 (04/27/25 0645)  Resp: 19 (04/27/25 0645)  BP: 131/61 (04/27/25 1024)  SpO2: (!) 94 % (04/27/25 0645) Vital Signs (24h Range):  Temp:  [97.1 °F (36.2 °C)-98.3 °F  (36.8 °C)] 98.3 °F (36.8 °C)  Pulse:  [] 105  Resp:  [15-36] 19  SpO2:  [89 %-99 %] 94 %  BP: ()/(47-78) 131/61     Weight: 83.5 kg (184 lb 1.4 oz)  Body mass index is 31.6 kg/m².    SpO2: (!) 94 %         Intake/Output Summary (Last 24 hours) at 4/27/2025 1110  Last data filed at 4/26/2025 1603  Gross per 24 hour   Intake --   Output 300 ml   Net -300 ml       Lines/Drains/Airways       Peripheral Intravenous Line  Duration                  Peripheral IV - Single Lumen 04/26/25 1336 18 G Left Antecubital <1 day         Peripheral IV - Single Lumen 04/27/25 0445 20 G Left Antecubital <1 day                     Physical Exam  Vitals reviewed.   Constitutional:       Appearance: She is well-developed.   Neck:      Vascular: No carotid bruit.   Cardiovascular:      Rate and Rhythm: Normal rate and regular rhythm.      Pulses: Intact distal pulses.      Heart sounds: Normal heart sounds. No murmur heard.  Pulmonary:      Breath sounds: Normal breath sounds.   Neurological:      Mental Status: She is oriented to person, place, and time.          Significant Labs: All pertinent lab results from the last 24 hours have been reviewed. and   Recent Lab Results  (Last 5 results in the past 24 hours)        04/27/25  1040   04/27/25  0408   04/26/25  1848   04/26/25  1629   04/26/25  1617        Anion Gap   11                12             Ascending aorta 3.2               Ao peak reny 1.3               Ao VTI 27.9               Appearance, UA       Clear         AV valve area 1.8               JESSICA by Velocity Ratio 2.1               AV mean gradient 4               AV index (prosthetic) 0.53               AV peak gradient 7               AV Velocity Ratio 0.62               Bacteria, UA       Many         Baso #   0.04             Basophil %   0.5             Bilirubin (UA)       Negative         BLOOD CULTURE               BSA 1.94               BUN   47                48             Calcium   9.2                 9.1             Chloride   100                100             CO2   28                27             Color, UA       Yellow         Creatinine   1.7                1.7             Left Ventricle Relative Wall Thickness 0.49               E/A ratio 3.81               E/E' ratio 14               eGFR   28  Comment: Estimated GFR calculated using the CKD-EPI creatinine (2021) equation.                28  Comment: Estimated GFR calculated using the CKD-EPI creatinine (2021) equation.             Eos #   0.23             Eos %   2.8             E wave deceleration time 194               FS 36.6               Glucose   188                185             Glucose, UA       Negative         Gran # (ANC)   4.91             Hematocrit   34.6             Hemoglobin   11.6             Extra Tube       Hold for add-ons.  Comment: Auto resulted.            Hyaline Casts, UA       1         Immature Grans (Abs)   0.02  Comment: Mild elevation in immature granulocytes is non specific and can be seen in a variety of conditions including stress response, acute inflammation, trauma and pregnancy. Correlation with other laboratory and clinical findings is essential.             Immature Granulocytes   0.2             IVRT 63               IVSd 0.9               Ketones, UA       Negative         LA WIDTH 4.9               LA area A2C 32.79               LA area A4C 25.66               Lactic Acid Level     1.9           Left Atrium Major Axis 7.0               Left Atrium Minor Axis 7.0               LA size 3.8               LA Vol 111                97               LA vol index 59               DEJAN (MOD) 51               LVOT area 3.5               Leukocyte Esterase, UA       Trace         LV LATERAL E/E' RATIO 12.5               LV SEPTAL E/E' RATIO 15.2               LV EDV BP 73               LV Diastolic Volume Index 38.62               Left Ventricular End Diastolic Volume by Teichholz Method 72.78               Left  Ventricular End Systolic Volume by Teichholz Method 23.95               LV ESV A2C 116.35               LV ESV A4C 75.27               LVIDd 4.1               LVIDs 2.6               LV mass 123.0               LV Mass Index 65.1               Left Ventricular Outflow Tract Mean Gradient 1.40               Left Ventricular Outflow Tract Mean Velocity 0.56               LVOT diameter 2.1               LVOT peak jus 0.8               LVOT stroke volume 51.2               LVOT peak VTI 14.8               LV ESV BP 24               LV Systolic Volume Index 12.7               Lymph #   2.14             Lymph %   26.2             Magnesium    2.0             MCH   33.6             MCHC   33.5             MCV   100             Mean e' 0.10               Microscopic Comment         Comment: Other formed elements not mentioned in the report are not present in the microscopic examination.         Mono #   0.84             Mono %   10.3             MPV   9.7             MV valve area p 1/2 method 3.90               MV Peak A Jus 0.36               MV Peak E Jus 1.37               MV stenosis pressure 1/2 time 56.40               Neut %   60.0             NITRITE UA       Negative         nRBC   0             Blood, UA       Negative         pH, UA       6.0         Phosphorus Level   3.2                3.2             Platelet Count   202             POCT Glucose         146       Potassium   3.2                3.2             Protein, UA       Negative  Comment: Recommend a 24 hour urine protein or a urine protein/creatinine ratio if globulin induced proteinuria is clinically suspected.         PW 1.0               RA Major Axis 6.43               Est. RA pres 8               RA Width 4.2               RBC   3.45             RBC, UA       3         RDW   13.2             RV TB RVSP 11               RV/LV Ratio 0.78               RVDD 3.20               RV- reyes basal diam 3.2               Sodium   139                139              Spec Grav UA       1.010         Squam Epithel, UA       2         STJ 2.6               TAPSE 1.3               TDI SEPTAL 0.09               TDI LATERAL 0.11               Triscuspid Valve Regurgitation Peak Gradient 47               TR Max Jus 3.4               Troponin I   0.099  Comment: The reference interval for Troponin I represents the 99th percentile cutoff for our facility and is consistent with 3rd generation assay performance.             TSH   2.946             TV resting pulmonary artery pressure 54               Urobilinogen, UA       Negative         WBC, UA       8         WBC   8.18             ZLVIDD -2.55               ZLVIDS -1.79                                      Significant Imaging: Echocardiogram: Transthoracic echo (TTE) complete (Cupid Only):   Results for orders placed or performed during the hospital encounter of 04/26/25   Echo   Result Value Ref Range    BSA 1.94 m2    LVOT stroke volume 51.2 cm3    LVIDd 4.1 3.5 - 6.0 cm    LV Systolic Volume 24 mL    LV Systolic Volume Index 12.7 mL/m2    LVIDs 2.6 2.1 - 4.0 cm    LV ESV A2C 116.35 mL    LV Diastolic Volume 73 mL    LV ESV A4C 75.27 mL    LV Diastolic Volume Index 38.62 mL/m2    Left Ventricular End Systolic Volume by Teichholz Method 23.95 mL    Left Ventricular End Diastolic Volume by Teichholz Method 72.78 mL    IVS 0.9 0.6 - 1.1 cm    LVOT diameter 2.1 cm    LVOT area 3.5 cm2    FS 36.6 28 - 44 %    Left Ventricle Relative Wall Thickness 0.49 cm    PW 1.0 0.6 - 1.1 cm    LV mass 123.0 g    LV Mass Index 65.1 g/m2    MV Peak E Jus 1.37 m/s    TDI LATERAL 0.11 m/s    TDI SEPTAL 0.09 m/s    E/E' ratio 14 m/s    MV Peak A Jus 0.36 m/s    TR Max Jus 3.4 m/s    E/A ratio 3.81     IVRT 63 msec    E wave deceleration time 194 msec    LV SEPTAL E/E' RATIO 15.2 m/s    LV LATERAL E/E' RATIO 12.5 m/s    LVOT peak jus 0.8 m/s    Left Ventricular Outflow Tract Mean Velocity 0.56 cm/s    Left Ventricular Outflow Tract Mean Gradient  1.40 mmHg    RV- reyes basal diam 3.2 cm    TAPSE 1.3 cm    RV/LV Ratio 0.78 cm    LA size 3.8 cm    Left Atrium Major Axis 7.0 cm    LA Vol (MOD) 97 mL    DEJAN (MOD) 51 mL/m2    RA Major Axis 6.43 cm    AV mean gradient 4 mmHg    AV peak gradient 7 mmHg    Ao peak reny 1.3 m/s    Ao VTI 27.9 cm    LVOT peak VTI 14.8 cm    AV valve area 1.8 cm²    AV Velocity Ratio 0.62     AV index (prosthetic) 0.53     JESSICA by Velocity Ratio 2.1 cm²    MV stenosis pressure 1/2 time 56.40 ms    MV valve area p 1/2 method 3.90 cm2    Triscuspid Valve Regurgitation Peak Gradient 47 mmHg    STJ 2.6 cm    Ascending aorta 3.2 cm    Mean e' 0.10 m/s    ZLVIDS -1.79     ZLVIDD -2.55     LA area A4C 25.66 cm2    LA area A2C 32.79 cm2    RVDD 3.20 cm    DEJAN 59 mL/m2    LA Vol 111 cm3    Left Atrium Minor Axis 7.0 cm    LA WIDTH 4.9 cm    RA Width 4.2 cm    TV resting pulmonary artery pressure 54 mmHg    RV TB RVSP 11 mmHg    Est. RA pres 8 mmHg    Narrative      Left Ventricle: The left ventricle is normal in size. Normal wall   thickness. There is normal systolic function with a visually estimated   ejection fraction of 60 - 65%. Grade III diastolic dysfunction.    Right Ventricle: The right ventricle is normal in size. Wall thickness   is normal. Systolic function is normal.    Left Atrium: Severely dilated    Mitral Valve: There is mild regurgitation.    Tricuspid Valve: There is mild to moderate regurgitation.    Pulmonary Artery: The estimated pulmonary artery systolic pressure is   54 mmHg.    IVC/SVC: Intermediate venous pressure at 8 mmHg.

## 2025-04-27 NOTE — PLAN OF CARE
[facility] - Intensive Care (Hospital)  Initial Discharge Assessment       Primary Care Provider: Phylicia Deleon MD    Admission Diagnosis: Dizziness [R42]  Bradycardia [R00.1]  Screening for cardiovascular condition [Z13.6]    Admission Date: 4/26/2025  Expected Discharge Date:     Transition of Care Barriers: None    Payor: [employer] MANAGED MEDICARE / Plan: [employer] MEDICARE HMO / Product Type: Capitation /     Extended Emergency Contact Information  Primary Emergency Contact: Arie Bettencourt  Address: [address] [address]           [place] [address] [place] [address]  Home Phone: [phone]  Mobile Phone: [phone]  Relation: Son  Secondary Emergency Contact: Yandy Bettencourt  Address: [address] [address] [facility]  Home Phone: [phone]  Work Phone: [phone]  Mobile Phone: [phone]  Relation: Relative    Discharge Plan A:  ([facility])         [facility] - [place] - [address] [facility]  [address]  [place] [address]  Phone: [phone] Fax: [phone]      Initial Assessment (most recent)       Adult Discharge Assessment - 04/27/25 1136          Discharge Assessment    Assessment Type Discharge Planning Assessment     Confirmed/corrected address, phone number and insurance Yes     Confirmed Demographics Correct on Facesheet     Source of Information patient     When was your last doctors appointment? --   Pt primary sees Heather Miller    Does patient/caregiver understand observation status Yes     Reason For Admission Dizziness     [facility] facility resident     Facility Arrived From: [facility]     Do you expect to return to your current living situation? Yes     Do you have help at home or someone to help you manage your care at home? No     Prior to hospitilization cognitive status: Alert/Oriented     Current cognitive status:  Alert/Oriented     Walking or Climbing Stairs Difficulty no     Dressing/Bathing Difficulty no     Home Accessibility wheelchair accessible     Home Layout Able to live on 1st floor     Equipment Currently Used at Home rollator     Readmission within 30 days? No     Patient currently being followed by outpatient case management? No     Do you currently have service(s) that help you manage your care at home? No     Do you take prescription medications? Yes     Do you have prescription coverage? Yes     Coverage [employer]     Do you have any problems affording any of your prescribed medications? No     Is the patient taking medications as prescribed? yes     Who is going to help you get home at discharge? Family     How do you get to doctors appointments? car, drives self     Are you on dialysis? No     Do you take coumadin? No     Discharge Plan A --   skilled nursing community    Discharge Plan discussed with: Patient     Transition of Care Barriers None        OTHER    Name(s) of [facility] skilled nursing community

## 2025-04-27 NOTE — ASSESSMENT & PLAN NOTE
Patient's blood pressure range in the last 24 hours was: BP  Min: 110/71  Max: 155/60.The patient's inpatient anti-hypertensive regimen is listed below:  Current Antihypertensives  furosemide tablet 40 mg, Daily, Oral  furosemide tablet 20 mg, Nightly, Oral    Plan  - BP is controlled, no changes needed to their regimen  - no AVN blocking meds  - will utilize prn IV medications if needed

## 2025-04-27 NOTE — ASSESSMENT & PLAN NOTE
Creatine stable for now. BMP reviewed- noted Estimated Creatinine Clearance: 22.1 mL/min (A) (based on SCr of 1.7 mg/dL (H)). according to latest data. 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  Monitor UOP and serial BMP and adjust therapy as needed. Renally dose meds. Avoid nephrotoxic medications and procedures.    - SSI with scheduled glucose checks  - check A1c

## 2025-04-27 NOTE — PROGRESS NOTES
Pharmacist Renal Dose Adjustment Note    Shirley Metz is a 89 y.o. female being treated with the medication pepcid    Patient Data:    Vital Signs (Most Recent):  Temp: 98.3 °F (36.8 °C) (04/27/25 0356)  Pulse: 105 (04/27/25 0645)  Resp: 19 (04/27/25 0645)  BP: (!) 117/53 (04/27/25 0645)  SpO2: (!) 94 % (04/27/25 0645) Vital Signs (72h Range):  Temp:  [97.1 °F (36.2 °C)-98.3 °F (36.8 °C)]   Pulse:  []   Resp:  [15-36]   BP: ()/(47-78)   SpO2:  [89 %-99 %]      Recent Labs   Lab 04/26/25  1352 04/27/25  0408   CREATININE 1.8* 1.7*  1.7*     Serum creatinine: 1.7 mg/dL (H) 04/27/25 0408  Estimated creatinine clearance: 22.1 mL/min (A)    Medication:Pepcid dose: 20mg frequency Q24 will be changed to medication:Pepcid dose:20mg frequency:q48    Pharmacist's Name: Irene Kiser  Pharmacist's Extension: [phone]

## 2025-04-27 NOTE — CONSULTS
[facility] ([facility])  Cardiology  Consult Note    Patient Name: Shirley Metz  MRN: [id number]  Admission Date: 4/26/2025  Hospital Length of Stay: 1 days  Code Status: Full Code   Attending Provider: Rachael Lee MD   Consulting Provider: Tran Nance MD  Primary Care Physician: Phylicia Deleon MD  Principal Problem:Symptomatic bradycardia    Patient information was obtained from patient, past medical records, and ER records.     Inpatient consult to Cardiology  Consult performed by: Tran Nance MD  Consult ordered by: Krystal Burdick MD  Reason for consult: Sick sinus syndrome      Rachael Lee MD   Subjective:     Chief Complaint:  Sick sinus syndrome     HPI:   89-year-old  female with history of persistent AFib on Eliquis, chronic diastolic CHF, glaucoma, morbid obesity, hypertension presents from assisted living with complaints of dizziness.  States she was in her normal state of health when she suddenly felt dizzy, lightheaded, blurry vision felt faint.  This was persistent prompting ER visit.  In the ER was noted to have heart rate between 101 10, and bouts of bradycardia into the 30s with brief pauses.  Had intermittent PACs/PVCs.  She states her heart rate typically runs 100- 110 Has had several ablations by Dr. Kelly in the past.  Was on metoprolol but stopped 6 weeks ago due to issues with blood pressure running low.  She has also been on midodrine as needed.  Denies any chest pain, lower extremity edema, nausea, vomiting fevers, chills, cough congestion, diarrhea.  She states her     Labs notable for potassium 3.4, troponin 0.052, chest x-ray without any acute findings.  EKG her, heart rate 100, intermittent PVCs, no ischemic ST changes noted.      04/27/2025.    Multiple pauses.  Some up to 5 seconds.  Moved overnight from tele to the ICU for closer monitoring  Eliquis on hold  Tachy-ed syndrome.    In the ICU.  Has pacer pads on.     Discussed in length risks and benefits of pacemaker.        Past Medical History:   Diagnosis Date    Acute on chronic diastolic congestive heart failure 01/11/2023    Anemia 11/29/2018    Anxiety 11/28/2017    Arthritis     Atrial fibrillation with RVR 10/09/2019    Back pain     Basal cell carcinoma 03/29/2018    left mid back    Chronic diastolic congestive heart failure 10/15/2019    CKD (chronic kidney disease) stage 3, GFR 30-59 ml/min 08/08/2016    Colon polyps     reported per patient.     Coronary artery calcification 10/05/2021    Elevated liver enzymes 12/20/2019    Elevated troponin 03/15/2018    Fuchs' corneal dystrophy     General anesthetics causing adverse effect in therapeutic use     woke up during surgery and gagged on ET tube    Glaucoma     Glaucoma     Heart failure with preserved left ventricular function (HFpEF) 07/24/2018    Hyperlipidemia     Hypertension     Macular degeneration dry    Medication side effects 05/03/2017    Obesity     Pre-diabetes 12/04/2017    PVD (peripheral vascular disease) 04/26/2021    PVD (peripheral vascular disease) 04/26/2021    Squamous cell carcinoma 01/08/2019    left shoulder    Stenosis of carotid artery 10/05/2021    Troponin level elevated 01/11/2023    Trouble in sleeping     Type 2 diabetes mellitus without complication, without long-term current use of insulin 02/24/2021    Urinary tract infection        Past Surgical History:   Procedure Laterality Date    ABLATION OF ARRHYTHMOGENIC FOCUS FOR ATRIAL FIBRILLATION N/A 11/03/2022    Procedure: Ablation atrial fibrillation;  Surgeon: Toi Kelly MD;  Location: [facility] EP LAB;  Service: Cardiology;  Laterality: N/A;  afib, PVI/CTI, RFA, STACEY (cx if SR), DEDE, anes, MB, 3 Prep    ADENOIDECTOMY  1938    BREAST BIOPSY Left     1997. benign    BREAST CYST EXCISION Left 1997 approx    CARPAL TUNNEL RELEASE Right 2012 approx    CATARACT EXTRACTION Bilateral 1994    CHOLECYSTECTOMY  1975    CORNEAL TRANSPLANT  Bilateral 08/2015 8/2015 - left; Right 4/2016    EYE SURGERY  12/06/2023    Fuch's Corneal dystrophy - had 2nd operation with Dr. Quintanilla    EYE SURGERY      Cataract wIOL    left thumb Left 2011    removed cuboid for arthritis    REVERSE TOTAL SHOULDER ARTHROPLASTY Left 08/21/2018    Procedure: ARTHROPLASTY, SHOULDER, TOTAL, REVERSE;  Surgeon: Solomon Lujan MD;  Location: [facility] OR;  Service: Orthopedics;  Laterality: Left;  WITH BICEP TENODESIS    SKIN CANCER EXCISION Left 2017    BCC removal by Dr. Valadez    SLT- OS (aka TRABECULOPLASTY) Left 05/11/2011    repeat 3/14/12    TENOTOMY Left 08/21/2018    Procedure: TENOTOMY;  Surgeon: Solomon Lujan MD;  Location: [facility] OR;  Service: Orthopedics;  Laterality: Left;    TONSILLECTOMY  1938    TRANSESOPHAGEAL ECHOCARDIOGRAM WITH POSSIBLE CARDIOVERSION (TSACEY W/ POSS CARDIOVERSION) N/A 03/21/2023    Procedure: Transesophageal echo (STACEY) intra-procedure log documentation;  Surgeon: Trevon Raimrez MD;  Location: [facility] CATH LAB;  Service: Cardiology;  Laterality: N/A;    TREATMENT OF CARDIAC ARRHYTHMIA N/A 10/10/2019    Procedure: CARDIOVERSION;  Surgeon: Pete Durán MD;  Location: [facility] CATH LAB;  Service: Cardiology;  Laterality: N/A;    TREATMENT OF CARDIAC ARRHYTHMIA N/A 12/06/2019    Procedure: CARDIOVERSION;  Surgeon: Samy Castillo MD;  Location: [facility] CATH LAB;  Service: Cardiology;  Laterality: N/A;       Review of patient's allergies indicates:   Allergen Reactions    Carvedilol Swelling     Lip swelling    Cephalexin Other (See Comments)     Muscle/joint weakness unable to move     Doxycycline Shortness Of Breath, Nausea And Vomiting and Other (See Comments)     weakness    Erythromycin Other (See Comments)     Complete weakness/could not walk    Gadolinium-containing contrast media Swelling     angioedema    Hydrocodone-acetaminophen Shortness Of Breath     wheezing    Labetalol Shortness Of Breath, Nausea Only, Palpitations and Other (See Comments)      weakness    Loratadine Other (See Comments)     Double vision/spots  Other reaction(s): double vision    Losartan Other (See Comments)     weakness    Loteprednol etabonate Other (See Comments), Palpitations, Anxiety and Shortness Of Breath     Patient stated bp went up and extreme muscle weakness    Naproxen      wheezing  wheezing  wheezing  Other reaction(s): wheezing    Prednisone Other (See Comments)     Myalgias and generalized weakness, unable to walk    Statins-hmg-coa reductase inhibitors Other (See Comments)     Severe Muscle weakness  Muscle weakness  Severe Muscle weakness  Other reaction(s): severe muscle weakness    Amlodipine Other (See Comments)     Myalgias    Fenofibrate Other (See Comments)     muscle weakness      Hydralazine Other (See Comments)     muscle tremors    Loteprednol Other (See Comments)     increased BP    Macrolide antibiotics Other (See Comments)     Complete weakness/could not walk      Moxifloxacin Hives and Other (See Comments)     muscle soreness    Timolol Other (See Comments)     Blurred vision, Burning eyes, Severe muscle weakness, eye problems.      Verapamil Diarrhea     Severe diarrhea.      Hydrocortisone     Isosorbide      Tolerates Imdur    Silver sulfadiaz-foam bandage     Tetanus and diphtheria toxoids     Tetanus vaccines and toxoid     Adhesive Rash     Clonidine patch--contact dermatitis    Codeine Diarrhea and Other (See Comments)     Loss of balance       Doxazosin Palpitations    Fexofenadine Other (See Comments)     Double vision/spots       Hydrocortisone (bulk) Other (See Comments)     Only suppository/ caused muscle weakness    Latanoprost Other (See Comments)     Muscle weakness      Olopatadine Other (See Comments)     Muscle weakness         No current facility-administered medications on file prior to encounter.     Current Outpatient Medications on File Prior to Encounter   Medication Sig    acetaminophen (TYLENOL) 500 MG tablet Take 500 mg by mouth  nightly as needed.    ALPRAZolam (XANAX) 0.5 MG tablet Take 1 tablet (0.5 mg total) by mouth nightly as needed for Anxiety.    b complex vitamins capsule Take 1 capsule by mouth once daily.    calcium carbonate/vitamin D3 (VITAMIN D-3 ORAL) Take 4,000 Int'l Units/day by mouth once daily.    ELIQUIS 2.5 mg Tab TAKE 1 TABLET (2.5 MG TOTAL) BY MOUTH 2 (TWO) TIMES DAILY.    furosemide (LASIX) 40 MG tablet Take 1 tab in am and 1/2 tab in evening    latanoprostene bunod (VYZULTA) 0.024 % Drop Place 1 drop into both eyes every evening.    midodrine (PROAMATINE) 2.5 MG Tab Take 1 tablet (2.5 mg total) by mouth 3 (three) times daily.    nebivoloL (BYSTOLIC) 5 MG Tab Take 1 tablet (5 mg total) by mouth once daily.    [DISCONTINUED] cloNIDine (CATAPRES) 0.1 MG tablet Take 1 tablet (0.1 mg total) by mouth 2 (two) times daily as needed (systolic BP over 180).    [DISCONTINUED] isosorbide mononitrate (IMDUR) 30 MG 24 hr tablet Take 1 tablet (30 mg total) by mouth every evening.     Family History       Problem Relation (Age of Onset)    Cancer Father (88)    Heart disease Mother    Hypertension Mother          Tobacco Use    Smoking status: Never    Smokeless tobacco: Never    Tobacco comments:     history of passive smoking from her ex-   Substance and Sexual Activity    Alcohol use: Yes     Alcohol/week: 2.0 standard drinks of alcohol     Types: 2 Standard drinks or equivalent per week     Comment: occ    Drug use: No    Sexual activity: Not Currently     Partners: Male     Birth control/protection: Post-menopausal     Comment: discussed protection for STD's     Review of Systems   Cardiovascular:  Negative for chest pain, dyspnea on exertion, palpitations and syncope.   Neurological:  Negative for focal weakness.     Objective:     Vital Signs (Most Recent):  Temp: 98.3 °F (36.8 °C) (04/27/25 0356)  Pulse: 105 (04/27/25 0645)  Resp: 19 (04/27/25 0645)  BP: 131/61 (04/27/25 1024)  SpO2: (!) 94 % (04/27/25 0645) Vital  Signs (24h Range):  Temp:  [97.1 °F (36.2 °C)-98.3 °F (36.8 °C)] 98.3 °F (36.8 °C)  Pulse:  [] 105  Resp:  [15-36] 19  SpO2:  [89 %-99 %] 94 %  BP: ()/(47-78) 131/61     Weight: 83.5 kg (184 lb 1.4 oz)  Body mass index is 31.6 kg/m².    SpO2: (!) 94 %         Intake/Output Summary (Last 24 hours) at 4/27/2025 1110  Last data filed at 4/26/2025 1603  Gross per 24 hour   Intake --   Output 300 ml   Net -300 ml       Lines/Drains/Airways       Peripheral Intravenous Line  Duration                  Peripheral IV - Single Lumen 04/26/25 1336 18 G Left Antecubital <1 day         Peripheral IV - Single Lumen 04/27/25 0445 20 G Left Antecubital <1 day                     Physical Exam  Vitals reviewed.   Constitutional:       Appearance: She is well-developed.   Neck:      Vascular: No carotid bruit.   Cardiovascular:      Rate and Rhythm: Normal rate and regular rhythm.      Pulses: Intact distal pulses.      Heart sounds: Normal heart sounds. No murmur heard.  Pulmonary:      Breath sounds: Normal breath sounds.   Neurological:      Mental Status: She is oriented to person, place, and time.          Significant Labs: All pertinent lab results from the last 24 hours have been reviewed. and   Recent Lab Results  (Last 5 results in the past 24 hours)        04/27/25  1040   04/27/25  0408   04/26/25  1848   04/26/25  1629   04/26/25  1617        Anion Gap   11                12             Ascending aorta 3.2               Ao peak reny 1.3               Ao VTI 27.9               Appearance, UA       Clear         AV valve area 1.8               JESSICA by Velocity Ratio 2.1               AV mean gradient 4               AV index (prosthetic) 0.53               AV peak gradient 7               AV Velocity Ratio 0.62               Bacteria, UA       Many         Baso #   0.04             Basophil %   0.5             Bilirubin (UA)       Negative         BLOOD CULTURE               BSA 1.94               BUN   47                 48             Calcium   9.2                9.1             Chloride   100                100             CO2   28                27             Color, UA       Yellow         Creatinine   1.7                1.7             Left Ventricle Relative Wall Thickness 0.49               E/A ratio 3.81               E/E' ratio 14               eGFR   28  Comment: Estimated GFR calculated using the CKD-EPI creatinine (2021) equation.                28  Comment: Estimated GFR calculated using the CKD-EPI creatinine (2021) equation.             Eos #   0.23             Eos %   2.8             E wave deceleration time 194               FS 36.6               Glucose   188                185             Glucose, UA       Negative         Gran # (ANC)   4.91             Hematocrit   34.6             Hemoglobin   11.6             Extra Tube       Hold for add-ons.  Comment: Auto resulted.            Hyaline Casts, UA       1         Immature Grans (Abs)   0.02  Comment: Mild elevation in immature granulocytes is non specific and can be seen in a variety of conditions including stress response, acute inflammation, trauma and pregnancy. Correlation with other laboratory and clinical findings is essential.             Immature Granulocytes   0.2             IVRT 63               IVSd 0.9               Ketones, UA       Negative         LA WIDTH 4.9               LA area A2C 32.79               LA area A4C 25.66               Lactic Acid Level     1.9           Left Atrium Major Axis 7.0               Left Atrium Minor Axis 7.0               LA size 3.8               LA Vol 111                97               LA vol index 59               DEJAN (MOD) 51               LVOT area 3.5               Leukocyte Esterase, UA       Trace         LV LATERAL E/E' RATIO 12.5               LV SEPTAL E/E' RATIO 15.2               LV EDV BP 73               LV Diastolic Volume Index 38.62               Left Ventricular End Diastolic Volume by  Teichholz Method 72.78               Left Ventricular End Systolic Volume by Teichholz Method 23.95               LV ESV A2C 116.35               LV ESV A4C 75.27               LVIDd 4.1               LVIDs 2.6               LV mass 123.0               LV Mass Index 65.1               Left Ventricular Outflow Tract Mean Gradient 1.40               Left Ventricular Outflow Tract Mean Velocity 0.56               LVOT diameter 2.1               LVOT peak jus 0.8               LVOT stroke volume 51.2               LVOT peak VTI 14.8               LV ESV BP 24               LV Systolic Volume Index 12.7               Lymph #   2.14             Lymph %   26.2             Magnesium    2.0             MCH   33.6             MCHC   33.5             MCV   100             Mean e' 0.10               Microscopic Comment         Comment: Other formed elements not mentioned in the report are not present in the microscopic examination.         Mono #   0.84             Mono %   10.3             MPV   9.7             MV valve area p 1/2 method 3.90               MV Peak A Jus 0.36               MV Peak E Jus 1.37               MV stenosis pressure 1/2 time 56.40               Neut %   60.0             NITRITE UA       Negative         nRBC   0             Blood, UA       Negative         pH, UA       6.0         Phosphorus Level   3.2                3.2             Platelet Count   202             POCT Glucose         146       Potassium   3.2                3.2             Protein, UA       Negative  Comment: Recommend a 24 hour urine protein or a urine protein/creatinine ratio if globulin induced proteinuria is clinically suspected.         PW 1.0               RA Major Axis 6.43               Est. RA pres 8               RA Width 4.2               RBC   3.45             RBC, UA       3         RDW   13.2             RV TB RVSP 11               RV/LV Ratio 0.78               RVDD 3.20               RV- reyes basal diam 3.2                Sodium   139                139             Spec Grav UA       1.010         Squam Epithel, UA       2         STJ 2.6               TAPSE 1.3               TDI SEPTAL 0.09               TDI LATERAL 0.11               Triscuspid Valve Regurgitation Peak Gradient 47               TR Max Ujs 3.4               Troponin I   0.099  Comment: The reference interval for Troponin I represents the 99th percentile cutoff for our facility and is consistent with 3rd generation assay performance.             TSH   2.946             TV resting pulmonary artery pressure 54               Urobilinogen, UA       Negative         WBC, UA       8         WBC   8.18             ZLVIDD -2.55               ZLVIDS -1.79                                      Significant Imaging: Echocardiogram: Transthoracic echo (TTE) complete (Cupid Only):   Results for orders placed or performed during the hospital encounter of 04/26/25   Echo   Result Value Ref Range    BSA 1.94 m2    LVOT stroke volume 51.2 cm3    LVIDd 4.1 3.5 - 6.0 cm    LV Systolic Volume 24 mL    LV Systolic Volume Index 12.7 mL/m2    LVIDs 2.6 2.1 - 4.0 cm    LV ESV A2C 116.35 mL    LV Diastolic Volume 73 mL    LV ESV A4C 75.27 mL    LV Diastolic Volume Index 38.62 mL/m2    Left Ventricular End Systolic Volume by Teichholz Method 23.95 mL    Left Ventricular End Diastolic Volume by Teichholz Method 72.78 mL    IVS 0.9 0.6 - 1.1 cm    LVOT diameter 2.1 cm    LVOT area 3.5 cm2    FS 36.6 28 - 44 %    Left Ventricle Relative Wall Thickness 0.49 cm    PW 1.0 0.6 - 1.1 cm    LV mass 123.0 g    LV Mass Index 65.1 g/m2    MV Peak E Jus 1.37 m/s    TDI LATERAL 0.11 m/s    TDI SEPTAL 0.09 m/s    E/E' ratio 14 m/s    MV Peak A Jus 0.36 m/s    TR Max Jus 3.4 m/s    E/A ratio 3.81     IVRT 63 msec    E wave deceleration time 194 msec    LV SEPTAL E/E' RATIO 15.2 m/s    LV LATERAL E/E' RATIO 12.5 m/s    LVOT peak jus 0.8 m/s    Left Ventricular Outflow Tract Mean Velocity 0.56 cm/s    Left  Ventricular Outflow Tract Mean Gradient 1.40 mmHg    RV- reyes basal diam 3.2 cm    TAPSE 1.3 cm    RV/LV Ratio 0.78 cm    LA size 3.8 cm    Left Atrium Major Axis 7.0 cm    LA Vol (MOD) 97 mL    DEJAN (MOD) 51 mL/m2    RA Major Axis 6.43 cm    AV mean gradient 4 mmHg    AV peak gradient 7 mmHg    Ao peak reny 1.3 m/s    Ao VTI 27.9 cm    LVOT peak VTI 14.8 cm    AV valve area 1.8 cm²    AV Velocity Ratio 0.62     AV index (prosthetic) 0.53     JESSICA by Velocity Ratio 2.1 cm²    MV stenosis pressure 1/2 time 56.40 ms    MV valve area p 1/2 method 3.90 cm2    Triscuspid Valve Regurgitation Peak Gradient 47 mmHg    STJ 2.6 cm    Ascending aorta 3.2 cm    Mean e' 0.10 m/s    ZLVIDS -1.79     ZLVIDD -2.55     LA area A4C 25.66 cm2    LA area A2C 32.79 cm2    RVDD 3.20 cm    DEJAN 59 mL/m2    LA Vol 111 cm3    Left Atrium Minor Axis 7.0 cm    LA WIDTH 4.9 cm    RA Width 4.2 cm    TV resting pulmonary artery pressure 54 mmHg    RV TB RVSP 11 mmHg    Est. RA pres 8 mmHg    Narrative      Left Ventricle: The left ventricle is normal in size. Normal wall   thickness. There is normal systolic function with a visually estimated   ejection fraction of 60 - 65%. Grade III diastolic dysfunction.    Right Ventricle: The right ventricle is normal in size. Wall thickness   is normal. Systolic function is normal.    Left Atrium: Severely dilated    Mitral Valve: There is mild regurgitation.    Tricuspid Valve: There is mild to moderate regurgitation.    Pulmonary Artery: The estimated pulmonary artery systolic pressure is   54 mmHg.    IVC/SVC: Intermediate venous pressure at 8 mmHg.       Assessment and Plan:     * Symptomatic bradycardia  See tachy-ed syndrome    Chronic diastolic congestive heart failure  Patient has Diastolic (HFpEF) heart failure that is Acute on chronic. On presentation their CHF was decompensated. Evidence of decompensated CHF on presentation includes: orthopnea. The etiology of their decompensation is likely  dietary indiscretion. Most recent BNP and echo results are listed below.  Recent Labs     04/26/25  1352   *     Latest ECHO  Results for orders placed during the hospital encounter of 04/26/25    Echo    Interpretation Summary    Left Ventricle: The left ventricle is normal in size. Normal wall thickness. There is normal systolic function with a visually estimated ejection fraction of 60 - 65%. Grade III diastolic dysfunction.    Right Ventricle: The right ventricle is normal in size. Wall thickness is normal. Systolic function is normal.    Left Atrium: Severely dilated    Mitral Valve: There is mild regurgitation.    Tricuspid Valve: There is mild to moderate regurgitation.    Pulmonary Artery: The estimated pulmonary artery systolic pressure is 54 mmHg.    IVC/SVC: Intermediate venous pressure at 8 mmHg.    Current Heart Failure Medications  furosemide injection 40 mg, Once, Intravenous    Plan  - Monitor strict I&Os and daily weights.    - Place on telemetry  - Low sodium diet  - Place on fluid restriction of 1.5 L.   - Cardiology has been consulted  - The patient's volume status is at their baseline  - give 1 dose IV Lasix.    DC p.o. Lasix.            Sick sinus syndrome  See tachy-ed syndrome    Tachy-ed syndrome  Hold Eliquis.    No Lovenox or heparin for DVT prophylaxis.    NPO after midnight.    Last dose of Eliquis was Friday evening.    Plan for dual-chamber pacemaker in a.m..    Continue to hold Bystolic.            VTE Risk Mitigation (From admission, onward)           Ordered     IP VTE HIGH RISK PATIENT  Once         04/26/25 1621     Place sequential compression device  Until discontinued         04/26/25 1621                  More than 40 minutes spent on this encounter  Thank you for your consult. I will follow-up with patient. Please contact us if you have any additional questions.    Tran Nance MD  Cardiology   [facility] ([facility])

## 2025-04-27 NOTE — EICU
EICU BRIEF INITIAL EVALUATION:    Code Status: Full Code     HISTORY:      89-year-old woman transferred from the floor for symptomatic bradycardia.  Had multiple pauses up to 5.7 cm with dizziness and feeling like she is going to pass out.  Admitted for bradycardia into the 30s.    History of AFib on Eliquis, diastolic CHF, type 2 diabetes, hyperlipidemia, CKD 4, microscopic hematuria, peripheral artery disease, left atrial appendage thrombus, bilateral inguinal hernia, carotid artery stenosis, hyperparathyroidism, VIRY positive glaucoma, hypertension, BMI 31.  On nebivolol    DVT prophylaxis-on Eliquis.  Being held for possible pacemaker  GI prophylaxis famotidine    /55, P 108-appears to be sinus rhythm but irregular with frequent sinus pauses  Resting comfortably on room air      CAMERA ASSESSMENT: Yes      TELEMETRY: Reviewed      NOTES: Reviewed     LABS: Reviewed      IMAGING: Personally reviewed.      DISCUSSED with bedside nurse        ASSESSMENT AND PLAN:       Symptomatic bradycardia-continue ICU monitoring. To be seen by Cardiology for evaluation for pacemaker.  Continue holding beta-blocker    CKD-appears at baseline - avoid nephrotoxins, renally dose medications.  Monitor renal function and urine output.  Gentle hydration    Hypokalemia.  Has been replaced orally.        I have reviewed the plan of care for the patient and agree with the plan of care unless noted otherwise above.      ALEJANDRINA Gaffney MD  eICU Attending  [phone]    This report has been created through the use of [employer] dictation software. Typographical and content errors may occur with this process. While efforts are made to detect and correct such errors, in some cases errors will persist. For this reason, wording in this document should be considered in the proper context and not strictly verbatim.

## 2025-04-27 NOTE — NURSING
Informed by monitor tech that pt had another pause measuring out to 5.7 second. Secure chat sent to . Did not wait for response. Went to Dr. Burdick and discussed plan. Order to transfer to ICU for higher level of monitoring. Report called to ALPHONSE Presley. Pt transported to ICU via bed. Once pt was in ICU, tele monitor was removed and returned to monitor room.

## 2025-04-27 NOTE — ASSESSMENT & PLAN NOTE
Patient has Diastolic (HFpEF) heart failure that is Acute on chronic. On presentation their CHF was decompensated. Evidence of decompensated CHF on presentation includes: orthopnea. The etiology of their decompensation is likely dietary indiscretion. Most recent BNP and echo results are listed below.  Recent Labs     04/26/25  1352   *     Latest ECHO  Results for orders placed during the hospital encounter of 04/26/25    Echo    Interpretation Summary    Left Ventricle: The left ventricle is normal in size. Normal wall thickness. There is normal systolic function with a visually estimated ejection fraction of 60 - 65%. Grade III diastolic dysfunction.    Right Ventricle: The right ventricle is normal in size. Wall thickness is normal. Systolic function is normal.    Left Atrium: Severely dilated    Mitral Valve: There is mild regurgitation.    Tricuspid Valve: There is mild to moderate regurgitation.    Pulmonary Artery: The estimated pulmonary artery systolic pressure is 54 mmHg.    IVC/SVC: Intermediate venous pressure at 8 mmHg.    Current Heart Failure Medications  furosemide injection 40 mg, Once, Intravenous    Plan  - Monitor strict I&Os and daily weights.    - Place on telemetry  - Low sodium diet  - Place on fluid restriction of 1.5 L.   - Cardiology has been consulted  - The patient's volume status is at their baseline  - give 1 dose IV Lasix.    DC p.o. Lasix.

## 2025-04-27 NOTE — HPI
89-year-old  female with history of persistent AFib on Eliquis, chronic diastolic CHF, glaucoma, morbid obesity, hypertension presents from assisted living with complaints of dizziness.  States she was in her normal state of health when she suddenly felt dizzy, lightheaded, blurry vision felt faint.  This was persistent prompting ER visit.  In the ER was noted to have heart rate between 101 10, and bouts of bradycardia into the 30s with brief pauses.  Had intermittent PACs/PVCs.  She states her heart rate typically runs 100- 110 Has had several ablations by Dr. Kelly in the past.  Was on metoprolol but stopped 6 weeks ago due to issues with blood pressure running low.  She has also been on midodrine as needed.  Denies any chest pain, lower extremity edema, nausea, vomiting fevers, chills, cough congestion, diarrhea.  She states her     Labs notable for potassium 3.4, troponin 0.052, chest x-ray without any acute findings.  EKG her, heart rate 100, intermittent PVCs, no ischemic ST changes noted.      04/27/2025.    Multiple pauses.  Some up to 5 seconds.  Moved overnight from tele to the ICU for closer monitoring  Eliquis on hold  Tachy-ed syndrome.    In the ICU.  Has pacer pads on.    Discussed in length risks and benefits of pacemaker.

## 2025-04-27 NOTE — ASSESSMENT & PLAN NOTE
Body mass index is 31.6 kg/m². Morbid obesity complicates all aspects of disease management from diagnostic modalities to treatment. Weight loss encouraged and health benefits explained to patient.

## 2025-04-27 NOTE — ASSESSMENT & PLAN NOTE
- rate variable, noted pauses on telemetry  - holding anticoagulation  - labs in process, replace electrolytes as indicated  - TSH pending  - continuous telemetry  - echo ordered  - cardiology consult placed  - NPO pending cards marguerite

## 2025-04-27 NOTE — H&P (VIEW-ONLY)
[facility] ([facility])  Cardiology  Consult Note    Patient Name: Shirley Metz  MRN: [id number]  Admission Date: 4/26/2025  Hospital Length of Stay: 1 days  Code Status: Full Code   Attending Provider: Rachael Lee MD   Consulting Provider: Tran Nanec MD  Primary Care Physician: Phylicia Deleon MD  Principal Problem:Symptomatic bradycardia    Patient information was obtained from patient, past medical records, and ER records.     Inpatient consult to Cardiology  Consult performed by: Tran Nance MD  Consult ordered by: Krystal Burdick MD  Reason for consult: Sick sinus syndrome      Rachael Lee MD   Subjective:     Chief Complaint:  Sick sinus syndrome     HPI:   89-year-old  female with history of persistent AFib on Eliquis, chronic diastolic CHF, glaucoma, morbid obesity, hypertension presents from assisted living with complaints of dizziness.  States she was in her normal state of health when she suddenly felt dizzy, lightheaded, blurry vision felt faint.  This was persistent prompting ER visit.  In the ER was noted to have heart rate between 101 10, and bouts of bradycardia into the 30s with brief pauses.  Had intermittent PACs/PVCs.  She states her heart rate typically runs 100- 110 Has had several ablations by Dr. Kelly in the past.  Was on metoprolol but stopped 6 weeks ago due to issues with blood pressure running low.  She has also been on midodrine as needed.  Denies any chest pain, lower extremity edema, nausea, vomiting fevers, chills, cough congestion, diarrhea.  She states her     Labs notable for potassium 3.4, troponin 0.052, chest x-ray without any acute findings.  EKG her, heart rate 100, intermittent PVCs, no ischemic ST changes noted.      04/27/2025.    Multiple pauses.  Some up to 5 seconds.  Moved overnight from tele to the ICU for closer monitoring  Eliquis on hold  Tachy-ed syndrome.    In the ICU.  Has pacer pads on.     Discussed in length risks and benefits of pacemaker.        Past Medical History:   Diagnosis Date    Acute on chronic diastolic congestive heart failure 01/11/2023    Anemia 11/29/2018    Anxiety 11/28/2017    Arthritis     Atrial fibrillation with RVR 10/09/2019    Back pain     Basal cell carcinoma 03/29/2018    left mid back    Chronic diastolic congestive heart failure 10/15/2019    CKD (chronic kidney disease) stage 3, GFR 30-59 ml/min 08/08/2016    Colon polyps     reported per patient.     Coronary artery calcification 10/05/2021    Elevated liver enzymes 12/20/2019    Elevated troponin 03/15/2018    Fuchs' corneal dystrophy     General anesthetics causing adverse effect in therapeutic use     woke up during surgery and gagged on ET tube    Glaucoma     Glaucoma     Heart failure with preserved left ventricular function (HFpEF) 07/24/2018    Hyperlipidemia     Hypertension     Macular degeneration dry    Medication side effects 05/03/2017    Obesity     Pre-diabetes 12/04/2017    PVD (peripheral vascular disease) 04/26/2021    PVD (peripheral vascular disease) 04/26/2021    Squamous cell carcinoma 01/08/2019    left shoulder    Stenosis of carotid artery 10/05/2021    Troponin level elevated 01/11/2023    Trouble in sleeping     Type 2 diabetes mellitus without complication, without long-term current use of insulin 02/24/2021    Urinary tract infection        Past Surgical History:   Procedure Laterality Date    ABLATION OF ARRHYTHMOGENIC FOCUS FOR ATRIAL FIBRILLATION N/A 11/03/2022    Procedure: Ablation atrial fibrillation;  Surgeon: Toi Kelly MD;  Location: [facility] EP LAB;  Service: Cardiology;  Laterality: N/A;  afib, PVI/CTI, RFA, STACEY (cx if SR), DEDE, anes, MB, 3 Prep    ADENOIDECTOMY  1938    BREAST BIOPSY Left     1997. benign    BREAST CYST EXCISION Left 1997 approx    CARPAL TUNNEL RELEASE Right 2012 approx    CATARACT EXTRACTION Bilateral 1994    CHOLECYSTECTOMY  1975    CORNEAL TRANSPLANT  Bilateral 08/2015 8/2015 - left; Right 4/2016    EYE SURGERY  12/06/2023    Fuch's Corneal dystrophy - had 2nd operation with Dr. Quintanilla    EYE SURGERY      Cataract wIOL    left thumb Left 2011    removed cuboid for arthritis    REVERSE TOTAL SHOULDER ARTHROPLASTY Left 08/21/2018    Procedure: ARTHROPLASTY, SHOULDER, TOTAL, REVERSE;  Surgeon: Solomon Lujan MD;  Location: [facility] OR;  Service: Orthopedics;  Laterality: Left;  WITH BICEP TENODESIS    SKIN CANCER EXCISION Left 2017    BCC removal by Dr. Valadez    SLT- OS (aka TRABECULOPLASTY) Left 05/11/2011    repeat 3/14/12    TENOTOMY Left 08/21/2018    Procedure: TENOTOMY;  Surgeon: Solomon Lujan MD;  Location: [facility] OR;  Service: Orthopedics;  Laterality: Left;    TONSILLECTOMY  1938    TRANSESOPHAGEAL ECHOCARDIOGRAM WITH POSSIBLE CARDIOVERSION (STACEY W/ POSS CARDIOVERSION) N/A 03/21/2023    Procedure: Transesophageal echo (STACEY) intra-procedure log documentation;  Surgeon: Trevon Ramirez MD;  Location: [facility] CATH LAB;  Service: Cardiology;  Laterality: N/A;    TREATMENT OF CARDIAC ARRHYTHMIA N/A 10/10/2019    Procedure: CARDIOVERSION;  Surgeon: Pete Durán MD;  Location: [facility] CATH LAB;  Service: Cardiology;  Laterality: N/A;    TREATMENT OF CARDIAC ARRHYTHMIA N/A 12/06/2019    Procedure: CARDIOVERSION;  Surgeon: Samy Castillo MD;  Location: [facility] CATH LAB;  Service: Cardiology;  Laterality: N/A;       Review of patient's allergies indicates:   Allergen Reactions    Carvedilol Swelling     Lip swelling    Cephalexin Other (See Comments)     Muscle/joint weakness unable to move     Doxycycline Shortness Of Breath, Nausea And Vomiting and Other (See Comments)     weakness    Erythromycin Other (See Comments)     Complete weakness/could not walk    Gadolinium-containing contrast media Swelling     angioedema    Hydrocodone-acetaminophen Shortness Of Breath     wheezing    Labetalol Shortness Of Breath, Nausea Only, Palpitations and Other (See Comments)      weakness    Loratadine Other (See Comments)     Double vision/spots  Other reaction(s): double vision    Losartan Other (See Comments)     weakness    Loteprednol etabonate Other (See Comments), Palpitations, Anxiety and Shortness Of Breath     Patient stated bp went up and extreme muscle weakness    Naproxen      wheezing  wheezing  wheezing  Other reaction(s): wheezing    Prednisone Other (See Comments)     Myalgias and generalized weakness, unable to walk    Statins-hmg-coa reductase inhibitors Other (See Comments)     Severe Muscle weakness  Muscle weakness  Severe Muscle weakness  Other reaction(s): severe muscle weakness    Amlodipine Other (See Comments)     Myalgias    Fenofibrate Other (See Comments)     muscle weakness      Hydralazine Other (See Comments)     muscle tremors    Loteprednol Other (See Comments)     increased BP    Macrolide antibiotics Other (See Comments)     Complete weakness/could not walk      Moxifloxacin Hives and Other (See Comments)     muscle soreness    Timolol Other (See Comments)     Blurred vision, Burning eyes, Severe muscle weakness, eye problems.      Verapamil Diarrhea     Severe diarrhea.      Hydrocortisone     Isosorbide      Tolerates Imdur    Silver sulfadiaz-foam bandage     Tetanus and diphtheria toxoids     Tetanus vaccines and toxoid     Adhesive Rash     Clonidine patch--contact dermatitis    Codeine Diarrhea and Other (See Comments)     Loss of balance       Doxazosin Palpitations    Fexofenadine Other (See Comments)     Double vision/spots       Hydrocortisone (bulk) Other (See Comments)     Only suppository/ caused muscle weakness    Latanoprost Other (See Comments)     Muscle weakness      Olopatadine Other (See Comments)     Muscle weakness         No current facility-administered medications on file prior to encounter.     Current Outpatient Medications on File Prior to Encounter   Medication Sig    acetaminophen (TYLENOL) 500 MG tablet Take 500 mg by mouth  nightly as needed.    ALPRAZolam (XANAX) 0.5 MG tablet Take 1 tablet (0.5 mg total) by mouth nightly as needed for Anxiety.    b complex vitamins capsule Take 1 capsule by mouth once daily.    calcium carbonate/vitamin D3 (VITAMIN D-3 ORAL) Take 4,000 Int'l Units/day by mouth once daily.    ELIQUIS 2.5 mg Tab TAKE 1 TABLET (2.5 MG TOTAL) BY MOUTH 2 (TWO) TIMES DAILY.    furosemide (LASIX) 40 MG tablet Take 1 tab in am and 1/2 tab in evening    latanoprostene bunod (VYZULTA) 0.024 % Drop Place 1 drop into both eyes every evening.    midodrine (PROAMATINE) 2.5 MG Tab Take 1 tablet (2.5 mg total) by mouth 3 (three) times daily.    nebivoloL (BYSTOLIC) 5 MG Tab Take 1 tablet (5 mg total) by mouth once daily.    [DISCONTINUED] cloNIDine (CATAPRES) 0.1 MG tablet Take 1 tablet (0.1 mg total) by mouth 2 (two) times daily as needed (systolic BP over 180).    [DISCONTINUED] isosorbide mononitrate (IMDUR) 30 MG 24 hr tablet Take 1 tablet (30 mg total) by mouth every evening.     Family History       Problem Relation (Age of Onset)    Cancer Father (88)    Heart disease Mother    Hypertension Mother          Tobacco Use    Smoking status: Never    Smokeless tobacco: Never    Tobacco comments:     history of passive smoking from her ex-   Substance and Sexual Activity    Alcohol use: Yes     Alcohol/week: 2.0 standard drinks of alcohol     Types: 2 Standard drinks or equivalent per week     Comment: occ    Drug use: No    Sexual activity: Not Currently     Partners: Male     Birth control/protection: Post-menopausal     Comment: discussed protection for STD's     Review of Systems   Cardiovascular:  Negative for chest pain, dyspnea on exertion, palpitations and syncope.   Neurological:  Negative for focal weakness.     Objective:     Vital Signs (Most Recent):  Temp: 98.3 °F (36.8 °C) (04/27/25 0356)  Pulse: 105 (04/27/25 0645)  Resp: 19 (04/27/25 0645)  BP: 131/61 (04/27/25 1024)  SpO2: (!) 94 % (04/27/25 0645) Vital  Signs (24h Range):  Temp:  [97.1 °F (36.2 °C)-98.3 °F (36.8 °C)] 98.3 °F (36.8 °C)  Pulse:  [] 105  Resp:  [15-36] 19  SpO2:  [89 %-99 %] 94 %  BP: ()/(47-78) 131/61     Weight: 83.5 kg (184 lb 1.4 oz)  Body mass index is 31.6 kg/m².    SpO2: (!) 94 %         Intake/Output Summary (Last 24 hours) at 4/27/2025 1110  Last data filed at 4/26/2025 1603  Gross per 24 hour   Intake --   Output 300 ml   Net -300 ml       Lines/Drains/Airways       Peripheral Intravenous Line  Duration                  Peripheral IV - Single Lumen 04/26/25 1336 18 G Left Antecubital <1 day         Peripheral IV - Single Lumen 04/27/25 0445 20 G Left Antecubital <1 day                     Physical Exam  Vitals reviewed.   Constitutional:       Appearance: She is well-developed.   Neck:      Vascular: No carotid bruit.   Cardiovascular:      Rate and Rhythm: Normal rate and regular rhythm.      Pulses: Intact distal pulses.      Heart sounds: Normal heart sounds. No murmur heard.  Pulmonary:      Breath sounds: Normal breath sounds.   Neurological:      Mental Status: She is oriented to person, place, and time.          Significant Labs: All pertinent lab results from the last 24 hours have been reviewed. and   Recent Lab Results  (Last 5 results in the past 24 hours)        04/27/25  1040   04/27/25  0408   04/26/25  1848   04/26/25  1629   04/26/25  1617        Anion Gap   11                12             Ascending aorta 3.2               Ao peak reny 1.3               Ao VTI 27.9               Appearance, UA       Clear         AV valve area 1.8               JESSICA by Velocity Ratio 2.1               AV mean gradient 4               AV index (prosthetic) 0.53               AV peak gradient 7               AV Velocity Ratio 0.62               Bacteria, UA       Many         Baso #   0.04             Basophil %   0.5             Bilirubin (UA)       Negative         BLOOD CULTURE               BSA 1.94               BUN   47                 48             Calcium   9.2                9.1             Chloride   100                100             CO2   28                27             Color, UA       Yellow         Creatinine   1.7                1.7             Left Ventricle Relative Wall Thickness 0.49               E/A ratio 3.81               E/E' ratio 14               eGFR   28  Comment: Estimated GFR calculated using the CKD-EPI creatinine (2021) equation.                28  Comment: Estimated GFR calculated using the CKD-EPI creatinine (2021) equation.             Eos #   0.23             Eos %   2.8             E wave deceleration time 194               FS 36.6               Glucose   188                185             Glucose, UA       Negative         Gran # (ANC)   4.91             Hematocrit   34.6             Hemoglobin   11.6             Extra Tube       Hold for add-ons.  Comment: Auto resulted.            Hyaline Casts, UA       1         Immature Grans (Abs)   0.02  Comment: Mild elevation in immature granulocytes is non specific and can be seen in a variety of conditions including stress response, acute inflammation, trauma and pregnancy. Correlation with other laboratory and clinical findings is essential.             Immature Granulocytes   0.2             IVRT 63               IVSd 0.9               Ketones, UA       Negative         LA WIDTH 4.9               LA area A2C 32.79               LA area A4C 25.66               Lactic Acid Level     1.9           Left Atrium Major Axis 7.0               Left Atrium Minor Axis 7.0               LA size 3.8               LA Vol 111                97               LA vol index 59               DEJAN (MOD) 51               LVOT area 3.5               Leukocyte Esterase, UA       Trace         LV LATERAL E/E' RATIO 12.5               LV SEPTAL E/E' RATIO 15.2               LV EDV BP 73               LV Diastolic Volume Index 38.62               Left Ventricular End Diastolic Volume by  Teichholz Method 72.78               Left Ventricular End Systolic Volume by Teichholz Method 23.95               LV ESV A2C 116.35               LV ESV A4C 75.27               LVIDd 4.1               LVIDs 2.6               LV mass 123.0               LV Mass Index 65.1               Left Ventricular Outflow Tract Mean Gradient 1.40               Left Ventricular Outflow Tract Mean Velocity 0.56               LVOT diameter 2.1               LVOT peak jus 0.8               LVOT stroke volume 51.2               LVOT peak VTI 14.8               LV ESV BP 24               LV Systolic Volume Index 12.7               Lymph #   2.14             Lymph %   26.2             Magnesium    2.0             MCH   33.6             MCHC   33.5             MCV   100             Mean e' 0.10               Microscopic Comment         Comment: Other formed elements not mentioned in the report are not present in the microscopic examination.         Mono #   0.84             Mono %   10.3             MPV   9.7             MV valve area p 1/2 method 3.90               MV Peak A Jus 0.36               MV Peak E Jus 1.37               MV stenosis pressure 1/2 time 56.40               Neut %   60.0             NITRITE UA       Negative         nRBC   0             Blood, UA       Negative         pH, UA       6.0         Phosphorus Level   3.2                3.2             Platelet Count   202             POCT Glucose         146       Potassium   3.2                3.2             Protein, UA       Negative  Comment: Recommend a 24 hour urine protein or a urine protein/creatinine ratio if globulin induced proteinuria is clinically suspected.         PW 1.0               RA Major Axis 6.43               Est. RA pres 8               RA Width 4.2               RBC   3.45             RBC, UA       3         RDW   13.2             RV TB RVSP 11               RV/LV Ratio 0.78               RVDD 3.20               RV- reyes basal diam 3.2                Sodium   139                139             Spec Grav UA       1.010         Squam Epithel, UA       2         STJ 2.6               TAPSE 1.3               TDI SEPTAL 0.09               TDI LATERAL 0.11               Triscuspid Valve Regurgitation Peak Gradient 47               TR Max Jus 3.4               Troponin I   0.099  Comment: The reference interval for Troponin I represents the 99th percentile cutoff for our facility and is consistent with 3rd generation assay performance.             TSH   2.946             TV resting pulmonary artery pressure 54               Urobilinogen, UA       Negative         WBC, UA       8         WBC   8.18             ZLVIDD -2.55               ZLVIDS -1.79                                      Significant Imaging: Echocardiogram: Transthoracic echo (TTE) complete (Cupid Only):   Results for orders placed or performed during the hospital encounter of 04/26/25   Echo   Result Value Ref Range    BSA 1.94 m2    LVOT stroke volume 51.2 cm3    LVIDd 4.1 3.5 - 6.0 cm    LV Systolic Volume 24 mL    LV Systolic Volume Index 12.7 mL/m2    LVIDs 2.6 2.1 - 4.0 cm    LV ESV A2C 116.35 mL    LV Diastolic Volume 73 mL    LV ESV A4C 75.27 mL    LV Diastolic Volume Index 38.62 mL/m2    Left Ventricular End Systolic Volume by Teichholz Method 23.95 mL    Left Ventricular End Diastolic Volume by Teichholz Method 72.78 mL    IVS 0.9 0.6 - 1.1 cm    LVOT diameter 2.1 cm    LVOT area 3.5 cm2    FS 36.6 28 - 44 %    Left Ventricle Relative Wall Thickness 0.49 cm    PW 1.0 0.6 - 1.1 cm    LV mass 123.0 g    LV Mass Index 65.1 g/m2    MV Peak E Jus 1.37 m/s    TDI LATERAL 0.11 m/s    TDI SEPTAL 0.09 m/s    E/E' ratio 14 m/s    MV Peak A Jus 0.36 m/s    TR Max Jus 3.4 m/s    E/A ratio 3.81     IVRT 63 msec    E wave deceleration time 194 msec    LV SEPTAL E/E' RATIO 15.2 m/s    LV LATERAL E/E' RATIO 12.5 m/s    LVOT peak jus 0.8 m/s    Left Ventricular Outflow Tract Mean Velocity 0.56 cm/s    Left  Ventricular Outflow Tract Mean Gradient 1.40 mmHg    RV- reyes basal diam 3.2 cm    TAPSE 1.3 cm    RV/LV Ratio 0.78 cm    LA size 3.8 cm    Left Atrium Major Axis 7.0 cm    LA Vol (MOD) 97 mL    DEJAN (MOD) 51 mL/m2    RA Major Axis 6.43 cm    AV mean gradient 4 mmHg    AV peak gradient 7 mmHg    Ao peak reny 1.3 m/s    Ao VTI 27.9 cm    LVOT peak VTI 14.8 cm    AV valve area 1.8 cm²    AV Velocity Ratio 0.62     AV index (prosthetic) 0.53     JESSICA by Velocity Ratio 2.1 cm²    MV stenosis pressure 1/2 time 56.40 ms    MV valve area p 1/2 method 3.90 cm2    Triscuspid Valve Regurgitation Peak Gradient 47 mmHg    STJ 2.6 cm    Ascending aorta 3.2 cm    Mean e' 0.10 m/s    ZLVIDS -1.79     ZLVIDD -2.55     LA area A4C 25.66 cm2    LA area A2C 32.79 cm2    RVDD 3.20 cm    DEJAN 59 mL/m2    LA Vol 111 cm3    Left Atrium Minor Axis 7.0 cm    LA WIDTH 4.9 cm    RA Width 4.2 cm    TV resting pulmonary artery pressure 54 mmHg    RV TB RVSP 11 mmHg    Est. RA pres 8 mmHg    Narrative      Left Ventricle: The left ventricle is normal in size. Normal wall   thickness. There is normal systolic function with a visually estimated   ejection fraction of 60 - 65%. Grade III diastolic dysfunction.    Right Ventricle: The right ventricle is normal in size. Wall thickness   is normal. Systolic function is normal.    Left Atrium: Severely dilated    Mitral Valve: There is mild regurgitation.    Tricuspid Valve: There is mild to moderate regurgitation.    Pulmonary Artery: The estimated pulmonary artery systolic pressure is   54 mmHg.    IVC/SVC: Intermediate venous pressure at 8 mmHg.       Assessment and Plan:     * Symptomatic bradycardia  See tachy-ed syndrome    Chronic diastolic congestive heart failure  Patient has Diastolic (HFpEF) heart failure that is Acute on chronic. On presentation their CHF was decompensated. Evidence of decompensated CHF on presentation includes: orthopnea. The etiology of their decompensation is likely  dietary indiscretion. Most recent BNP and echo results are listed below.  Recent Labs     04/26/25  1352   *     Latest ECHO  Results for orders placed during the hospital encounter of 04/26/25    Echo    Interpretation Summary    Left Ventricle: The left ventricle is normal in size. Normal wall thickness. There is normal systolic function with a visually estimated ejection fraction of 60 - 65%. Grade III diastolic dysfunction.    Right Ventricle: The right ventricle is normal in size. Wall thickness is normal. Systolic function is normal.    Left Atrium: Severely dilated    Mitral Valve: There is mild regurgitation.    Tricuspid Valve: There is mild to moderate regurgitation.    Pulmonary Artery: The estimated pulmonary artery systolic pressure is 54 mmHg.    IVC/SVC: Intermediate venous pressure at 8 mmHg.    Current Heart Failure Medications  furosemide injection 40 mg, Once, Intravenous    Plan  - Monitor strict I&Os and daily weights.    - Place on telemetry  - Low sodium diet  - Place on fluid restriction of 1.5 L.   - Cardiology has been consulted  - The patient's volume status is at their baseline  - give 1 dose IV Lasix.    DC p.o. Lasix.            Sick sinus syndrome  See tachy-ed syndrome    Tachy-ed syndrome  Hold Eliquis.    No Lovenox or heparin for DVT prophylaxis.    NPO after midnight.    Last dose of Eliquis was Friday evening.    Plan for dual-chamber pacemaker in a.m..    Continue to hold Bystolic.            VTE Risk Mitigation (From admission, onward)           Ordered     IP VTE HIGH RISK PATIENT  Once         04/26/25 1621     Place sequential compression device  Until discontinued         04/26/25 1621                  More than 40 minutes spent on this encounter  Thank you for your consult. I will follow-up with patient. Please contact us if you have any additional questions.    Tran Nance MD  Cardiology   [facility] ([facility])

## 2025-04-27 NOTE — ASSESSMENT & PLAN NOTE
Patient has Diastolic (HFpEF) heart failure that is Chronic. On presentation their CHF was well compensated. Most recent BNP and echo results are listed below.  Recent Labs     04/26/25  1352   *     Latest ECHO  Results for orders placed during the hospital encounter of 07/11/22    Echo    Interpretation Summary  · Concentric remodeling and normal systolic function.  · The estimated ejection fraction is 60%.  · Normal left ventricular diastolic function.  · Normal right ventricular size with normal right ventricular systolic function.    Current Heart Failure Medications  furosemide tablet 40 mg, Daily, Oral  furosemide tablet 20 mg, Nightly, Oral    Plan  - Monitor strict I&Os and daily weights.    - Place on telemetry  - Low sodium diet  - Place on fluid restriction of 1.5 L.   - Cardiology has been consulted  - The patient's volume status is at their baseline  - continue home diuretics as tolerated  - electrolyte monitoring

## 2025-04-27 NOTE — EICU
Virtual ICU Quality Rounds    Admit Date: 4/26/2025  Hospital Day: 1    ICU Day: 4h    24H Vital Sign Range:  Temp:  [97.1 °F (36.2 °C)-98.3 °F (36.8 °C)]   Pulse:  []   Resp:  [15-36]   BP: ()/(47-78)   SpO2:  [89 %-99 %]     VICU Screening                    LDA reconciliation : Yes    Nursing orders placed : IP MARCE Peripheral IV Access

## 2025-04-28 ENCOUNTER — ANESTHESIA EVENT (OUTPATIENT)
Dept: CARDIOLOGY | Facility: HOSPITAL | Age: OVER 89
End: 2025-04-28
Payer: MEDICARE

## 2025-04-28 ENCOUNTER — ANESTHESIA (OUTPATIENT)
Dept: CARDIOLOGY | Facility: HOSPITAL | Age: OVER 89
End: 2025-04-28
Payer: MEDICARE

## 2025-04-28 DIAGNOSIS — Z95.0 CARDIAC PACEMAKER IN SITU: Primary | ICD-10-CM

## 2025-04-28 DIAGNOSIS — R00.1 SYMPTOMATIC SINUS BRADYCARDIA: ICD-10-CM

## 2025-04-28 LAB
ABSOLUTE EOSINOPHIL (OHS): 0.19 K/UL
ABSOLUTE MONOCYTE (OHS): 1.07 K/UL (ref 0.3–1)
ABSOLUTE NEUTROPHIL COUNT (OHS): 8.78 K/UL (ref 1.8–7.7)
ANION GAP (OHS): 11 MMOL/L (ref 8–16)
BASOPHILS # BLD AUTO: 0.05 K/UL
BASOPHILS NFR BLD AUTO: 0.4 %
BUN SERPL-MCNC: 44 MG/DL (ref 10–30)
CALCIUM SERPL-MCNC: 9.9 MG/DL (ref 8.7–10.5)
CHLORIDE SERPL-SCNC: 97 MMOL/L (ref 95–110)
CO2 SERPL-SCNC: 29 MMOL/L (ref 23–29)
CREAT SERPL-MCNC: 1.9 MG/DL (ref 0.5–1.4)
EAG (OHS): 174 MG/DL (ref 68–131)
ERYTHROCYTE [DISTWIDTH] IN BLOOD BY AUTOMATED COUNT: 13 % (ref 11.5–14.5)
GFR SERPLBLD CREATININE-BSD FMLA CKD-EPI: 25 ML/MIN/1.73/M2
GLUCOSE SERPL-MCNC: 155 MG/DL (ref 70–110)
HBA1C MFR BLD: 7.7 % (ref 4–5.6)
HCT VFR BLD AUTO: 36.8 % (ref 37–48.5)
HGB BLD-MCNC: 12.2 GM/DL (ref 12–16)
IMM GRANULOCYTES # BLD AUTO: 0.04 K/UL (ref 0–0.04)
IMM GRANULOCYTES NFR BLD AUTO: 0.3 % (ref 0–0.5)
LYMPHOCYTES # BLD AUTO: 1.42 K/UL (ref 1–4.8)
MAGNESIUM SERPL-MCNC: 2.1 MG/DL (ref 1.6–2.6)
MCH RBC QN AUTO: 33.2 PG (ref 27–31)
MCHC RBC AUTO-ENTMCNC: 33.2 G/DL (ref 32–36)
MCV RBC AUTO: 100 FL (ref 82–98)
NUCLEATED RBC (/100WBC) (OHS): 0 /100 WBC
OHS QRS DURATION: 86 MS
OHS QTC CALCULATION: 510 MS
PLATELET # BLD AUTO: 216 K/UL (ref 150–450)
PMV BLD AUTO: 9.9 FL (ref 9.2–12.9)
POCT GLUCOSE: 174 MG/DL (ref 70–110)
POTASSIUM SERPL-SCNC: 3.6 MMOL/L (ref 3.5–5.1)
RBC # BLD AUTO: 3.68 M/UL (ref 4–5.4)
RELATIVE EOSINOPHIL (OHS): 1.6 %
RELATIVE LYMPHOCYTE (OHS): 12.3 % (ref 18–48)
RELATIVE MONOCYTE (OHS): 9.3 % (ref 4–15)
RELATIVE NEUTROPHIL (OHS): 76.1 % (ref 38–73)
SODIUM SERPL-SCNC: 137 MMOL/L (ref 136–145)
WBC # BLD AUTO: 11.55 K/UL (ref 3.9–12.7)

## 2025-04-28 PROCEDURE — 37000009 HC ANESTHESIA EA ADD 15 MINS: Mod: HCNC | Performed by: INTERNAL MEDICINE

## 2025-04-28 PROCEDURE — 27201423 OPTIME MED/SURG SUP & DEVICES STERILE SUPPLY: Mod: HCNC | Performed by: INTERNAL MEDICINE

## 2025-04-28 PROCEDURE — 83036 HEMOGLOBIN GLYCOSYLATED A1C: CPT | Mod: HCNC

## 2025-04-28 PROCEDURE — 85025 COMPLETE CBC W/AUTO DIFF WBC: CPT | Mod: HCNC

## 2025-04-28 PROCEDURE — 94761 N-INVAS EAR/PLS OXIMETRY MLT: CPT | Mod: HCNC

## 2025-04-28 PROCEDURE — 25000003 PHARM REV CODE 250: Mod: HCNC | Performed by: NURSE ANESTHETIST, CERTIFIED REGISTERED

## 2025-04-28 PROCEDURE — 02HK3JZ INSERTION OF PACEMAKER LEAD INTO RIGHT VENTRICLE, PERCUTANEOUS APPROACH: ICD-10-PCS | Performed by: INTERNAL MEDICINE

## 2025-04-28 PROCEDURE — 0JH606Z INSERTION OF PACEMAKER, DUAL CHAMBER INTO CHEST SUBCUTANEOUS TISSUE AND FASCIA, OPEN APPROACH: ICD-10-PCS | Performed by: INTERNAL MEDICINE

## 2025-04-28 PROCEDURE — 33208 INSRT HEART PM ATRIAL & VENT: CPT | Mod: KX,HCNC | Performed by: INTERNAL MEDICINE

## 2025-04-28 PROCEDURE — C1898 LEAD, PMKR, OTHER THAN TRANS: HCPCS | Mod: HCNC | Performed by: INTERNAL MEDICINE

## 2025-04-28 PROCEDURE — 25000003 PHARM REV CODE 250: Mod: HCNC | Performed by: INTERNAL MEDICINE

## 2025-04-28 PROCEDURE — 99499 UNLISTED E&M SERVICE: CPT | Mod: HCNC,,, | Performed by: INTERNAL MEDICINE

## 2025-04-28 PROCEDURE — 63600175 PHARM REV CODE 636 W HCPCS: Mod: HCNC | Performed by: NURSE ANESTHETIST, CERTIFIED REGISTERED

## 2025-04-28 PROCEDURE — 25000003 PHARM REV CODE 250: Mod: HCNC | Performed by: STUDENT IN AN ORGANIZED HEALTH CARE EDUCATION/TRAINING PROGRAM

## 2025-04-28 PROCEDURE — 36415 COLL VENOUS BLD VENIPUNCTURE: CPT | Mod: HCNC

## 2025-04-28 PROCEDURE — 37000008 HC ANESTHESIA 1ST 15 MINUTES: Mod: HCNC | Performed by: INTERNAL MEDICINE

## 2025-04-28 PROCEDURE — C1894 INTRO/SHEATH, NON-LASER: HCPCS | Mod: HCNC | Performed by: INTERNAL MEDICINE

## 2025-04-28 PROCEDURE — 27000221 HC OXYGEN, UP TO 24 HOURS: Mod: HCNC

## 2025-04-28 PROCEDURE — 02H63JZ INSERTION OF PACEMAKER LEAD INTO RIGHT ATRIUM, PERCUTANEOUS APPROACH: ICD-10-PCS | Performed by: INTERNAL MEDICINE

## 2025-04-28 PROCEDURE — 25000003 PHARM REV CODE 250: Mod: HCNC

## 2025-04-28 PROCEDURE — 20000000 HC ICU ROOM: Mod: HCNC

## 2025-04-28 PROCEDURE — 83735 ASSAY OF MAGNESIUM: CPT | Mod: HCNC

## 2025-04-28 PROCEDURE — C1785 PMKR, DUAL, RATE-RESP: HCPCS | Mod: HCNC | Performed by: INTERNAL MEDICINE

## 2025-04-28 PROCEDURE — 63600175 PHARM REV CODE 636 W HCPCS: Mod: HCNC | Performed by: INTERNAL MEDICINE

## 2025-04-28 PROCEDURE — 82310 ASSAY OF CALCIUM: CPT | Mod: HCNC

## 2025-04-28 PROCEDURE — 33208 INSRT HEART PM ATRIAL & VENT: CPT | Mod: KX,HCNC,, | Performed by: INTERNAL MEDICINE

## 2025-04-28 PROCEDURE — 25000003 PHARM REV CODE 250: Mod: HCNC | Performed by: NURSE PRACTITIONER

## 2025-04-28 DEVICE — IMPLANTABLE DEVICE
Type: IMPLANTABLE DEVICE | Site: HEART | Status: FUNCTIONAL
Brand: SOLIA S 53

## 2025-04-28 DEVICE — IMPLANTABLE DEVICE
Type: IMPLANTABLE DEVICE | Site: HEART | Status: FUNCTIONAL
Brand: AMVIA EDGE DR-T

## 2025-04-28 DEVICE — IMPLANTABLE DEVICE
Type: IMPLANTABLE DEVICE | Site: HEART | Status: FUNCTIONAL
Brand: SOLIA S 45

## 2025-04-28 RX ORDER — POTASSIUM CHLORIDE 20 MEQ/1
40 TABLET, EXTENDED RELEASE ORAL ONCE
Status: COMPLETED | OUTPATIENT
Start: 2025-04-28 | End: 2025-04-28

## 2025-04-28 RX ORDER — VANCOMYCIN HYDROCHLORIDE 1 G/20ML
INJECTION, POWDER, LYOPHILIZED, FOR SOLUTION INTRAVENOUS
Status: DISCONTINUED | OUTPATIENT
Start: 2025-04-28 | End: 2025-04-28 | Stop reason: HOSPADM

## 2025-04-28 RX ORDER — LIDOCAINE HYDROCHLORIDE 20 MG/ML
INJECTION, SOLUTION EPIDURAL; INFILTRATION; INTRACAUDAL; PERINEURAL
Status: DISCONTINUED | OUTPATIENT
Start: 2025-04-28 | End: 2025-04-28 | Stop reason: HOSPADM

## 2025-04-28 RX ORDER — ONDANSETRON HYDROCHLORIDE 2 MG/ML
INJECTION, SOLUTION INTRAVENOUS
Status: DISCONTINUED | OUTPATIENT
Start: 2025-04-28 | End: 2025-04-28

## 2025-04-28 RX ORDER — FENTANYL CITRATE 50 UG/ML
INJECTION, SOLUTION INTRAMUSCULAR; INTRAVENOUS
Status: DISCONTINUED | OUTPATIENT
Start: 2025-04-28 | End: 2025-04-28

## 2025-04-28 RX ORDER — CEPHALEXIN 500 MG/1
500 CAPSULE ORAL EVERY 8 HOURS
Status: DISCONTINUED | OUTPATIENT
Start: 2025-04-28 | End: 2025-05-02 | Stop reason: HOSPADM

## 2025-04-28 RX ORDER — CEFAZOLIN SODIUM 1 G/3ML
INJECTION, POWDER, FOR SOLUTION INTRAMUSCULAR; INTRAVENOUS
Status: DISCONTINUED | OUTPATIENT
Start: 2025-04-28 | End: 2025-04-28 | Stop reason: ALTCHOICE

## 2025-04-28 RX ORDER — ZOLPIDEM TARTRATE 5 MG/1
5 TABLET ORAL NIGHTLY PRN
Status: DISCONTINUED | OUTPATIENT
Start: 2025-04-28 | End: 2025-04-28

## 2025-04-28 RX ORDER — MIDAZOLAM HYDROCHLORIDE 1 MG/ML
INJECTION INTRAMUSCULAR; INTRAVENOUS
Status: DISCONTINUED | OUTPATIENT
Start: 2025-04-28 | End: 2025-04-28

## 2025-04-28 RX ADMIN — FENTANYL CITRATE 75 MCG: 50 INJECTION, SOLUTION INTRAMUSCULAR; INTRAVENOUS at 02:04

## 2025-04-28 RX ADMIN — MIDAZOLAM HYDROCHLORIDE 1 MG: 1 INJECTION, SOLUTION INTRAMUSCULAR; INTRAVENOUS at 01:04

## 2025-04-28 RX ADMIN — FENTANYL CITRATE 25 MCG: 50 INJECTION, SOLUTION INTRAMUSCULAR; INTRAVENOUS at 01:04

## 2025-04-28 RX ADMIN — LIDOCAINE 5% 1 PATCH: 700 PATCH TOPICAL at 09:04

## 2025-04-28 RX ADMIN — ONDANSETRON 4 MG: 2 INJECTION INTRAMUSCULAR; INTRAVENOUS at 01:04

## 2025-04-28 RX ADMIN — MUPIROCIN: 20 OINTMENT TOPICAL at 10:04

## 2025-04-28 RX ADMIN — ALPRAZOLAM 0.5 MG: 0.5 TABLET ORAL at 10:04

## 2025-04-28 RX ADMIN — MUPIROCIN: 20 OINTMENT TOPICAL at 08:04

## 2025-04-28 RX ADMIN — SODIUM CHLORIDE: 9 INJECTION, SOLUTION INTRAVENOUS at 01:04

## 2025-04-28 RX ADMIN — POTASSIUM CHLORIDE 40 MEQ: 1500 TABLET, EXTENDED RELEASE ORAL at 03:04

## 2025-04-28 RX ADMIN — CEPHALEXIN 500 MG: 500 CAPSULE ORAL at 09:04

## 2025-04-28 RX ADMIN — CEFAZOLIN 2 G: 330 INJECTION, POWDER, FOR SOLUTION INTRAMUSCULAR; INTRAVENOUS at 01:04

## 2025-04-28 RX ADMIN — ACETAMINOPHEN 650 MG: 325 TABLET ORAL at 10:04

## 2025-04-28 NOTE — ASSESSMENT & PLAN NOTE
- rate variable, noted pauses on telemetry  - holding anticoagulation  - labs in process, replace electrolytes as indicated  - TSH pending  - continuous telemetry  - echo ordered  - cardiology consult placed  - NPO pending cards eval    4/28: PPM planned today.

## 2025-04-28 NOTE — EICU
Intervention Initiated From:  [facility] / OTONIEL HERRMANN Night Rounds Checklist  24H Vital Sign Range:  Temp:  [97.8 °F (36.6 °C)-99 °F (37.2 °C)]   Pulse:  []   Resp:  [18-41]   BP: ()/(47-84)   SpO2:  [88 %-95 %]     Video rounds: Unable d/t inoperable camera.

## 2025-04-28 NOTE — ANESTHESIA POSTPROCEDURE EVALUATION
Anesthesia Post Evaluation    Patient: Shirley Metz    Procedure(s) Performed: Procedure(s) (LRB):  INSERTION, CARDIAC PACEMAKER, DUAL CHAMBER (N/A)    Final Anesthesia Type: MAC      Patient location during evaluation: Cath Lab  Patient participation: Yes- Able to Participate  Level of consciousness: awake and alert, oriented and awake  Post-procedure vital signs: reviewed and stable  Pain management: adequate  Airway patency: patent    PONV status at discharge: No PONV  Anesthetic complications: no      Cardiovascular status: blood pressure returned to baseline  Respiratory status: unassisted, spontaneous ventilation and room air  Hydration status: euvolemic  Follow-up not needed.              Vitals Value Taken Time   /53 04/28/25 13:02   Temp 37 °C (98.6 °F) 04/28/25 11:15   Pulse 74 04/28/25 13:02   Resp 35 04/28/25 13:02   SpO2 89 % 04/28/25 13:02   Vitals shown include unfiled device data.      No case tracking events are documented in the log.      Pain/Beata Score: Pain Rating Prior to Med Admin: 6 (4/28/2025 10:45 AM)  Pain Rating Post Med Admin: 2 (4/28/2025 11:45 AM)

## 2025-04-28 NOTE — SUBJECTIVE & OBJECTIVE
Review of Systems   Cardiovascular:  Negative for chest pain, dyspnea on exertion, palpitations and syncope.   Neurological:  Negative for focal weakness.     Objective:     Vital Signs (Most Recent):  Temp: 99.1 °F (37.3 °C) (04/28/25 0305)  Pulse: 78 (04/28/25 0600)  Resp: (!) 24 (04/28/25 0600)  BP: 120/67 (04/28/25 0600)  SpO2: 97 % (04/28/25 0600) Vital Signs (24h Range):  Temp:  [97.8 °F (36.6 °C)-99.1 °F (37.3 °C)] 99.1 °F (37.3 °C)  Pulse:  [] 78  Resp:  [20-48] 24  SpO2:  [88 %-97 %] 97 %  BP: (100-144)/(53-84) 120/67     Weight: 83.5 kg (184 lb 1.4 oz)  Body mass index is 31.6 kg/m².     SpO2: 97 %         Intake/Output Summary (Last 24 hours) at 4/28/2025 1047  Last data filed at 4/28/2025 0600  Gross per 24 hour   Intake --   Output 3465 ml   Net -3465 ml       Lines/Drains/Airways       Drain  Duration                  Urethral Catheter 04/27/25 0715 1 day              Peripheral Intravenous Line  Duration                  Peripheral IV - Single Lumen 04/27/25 1315 20 G Distal;Posterior;Right Forearm <1 day                       Physical Exam  Vitals reviewed.   Constitutional:       Appearance: She is well-developed.   Neck:      Vascular: No carotid bruit.   Cardiovascular:      Rate and Rhythm: Normal rate and regular rhythm.      Pulses: Intact distal pulses.      Heart sounds: Normal heart sounds. No murmur heard.  Pulmonary:      Breath sounds: Normal breath sounds.   Neurological:      Mental Status: She is oriented to person, place, and time.            Significant Labs: All pertinent lab results from the last 24 hours have been reviewed. and   Recent Lab Results         04/28/25  0604   04/28/25  0300   04/27/25  2302   04/27/25 2016        Anion Gap   11           Baso #   0.05           Basophil %   0.4           BUN   44           Calcium   9.9           Chloride   97           CO2   29           Creatinine   1.9           eGFR   25  Comment: Estimated GFR calculated using the  CKD-EPI creatinine (2021) equation.           Eos #   0.19           Eos %   1.6           Glucose   155           Gran # (ANC)   8.78           Hematocrit   36.8           Hemoglobin   12.2           Immature Grans (Abs)   0.04  Comment: Mild elevation in immature granulocytes is non specific and can be seen in a variety of conditions including stress response, acute inflammation, trauma and pregnancy. Correlation with other laboratory and clinical findings is essential.           Immature Granulocytes   0.3           Lymph #   1.42           Lymph %   12.3           Magnesium    2.1           MCH   33.2           MCHC   33.2           MCV   100           Mono #   1.07           Mono %   9.3           MPV   9.9           Neut %   76.1           nRBC   0           Platelet Count   216           POCT Glucose 174     123   145       Potassium   3.6           RBC   3.68           RDW   13.0           Sodium   137           WBC   11.55                   Significant Imaging: Echocardiogram: Transthoracic echo (TTE) complete (Cupid Only):   Results for orders placed or performed during the hospital encounter of 04/26/25   Echo   Result Value Ref Range    BSA 1.94 m2    LVOT stroke volume 51.2 cm3    LVIDd 4.1 3.5 - 6.0 cm    LV Systolic Volume 24 mL    LV Systolic Volume Index 12.7 mL/m2    LVIDs 2.6 2.1 - 4.0 cm    LV ESV A2C 116.35 mL    LV Diastolic Volume 73 mL    LV ESV A4C 75.27 mL    LV Diastolic Volume Index 38.62 mL/m2    Left Ventricular End Systolic Volume by Teichholz Method 23.95 mL    Left Ventricular End Diastolic Volume by Teichholz Method 72.78 mL    IVS 0.9 0.6 - 1.1 cm    LVOT diameter 2.1 cm    LVOT area 3.5 cm2    FS 36.6 28 - 44 %    Left Ventricle Relative Wall Thickness 0.49 cm    PW 1.0 0.6 - 1.1 cm    LV mass 123.0 g    LV Mass Index 65.1 g/m2    MV Peak E Jus 1.37 m/s    TDI LATERAL 0.11 m/s    TDI SEPTAL 0.09 m/s    E/E' ratio 14 m/s    MV Peak A Jus 0.36 m/s    TR Max Jus 3.4 m/s    E/A ratio  3.81     IVRT 63 msec    E wave deceleration time 194 msec    LV SEPTAL E/E' RATIO 15.2 m/s    LV LATERAL E/E' RATIO 12.5 m/s    LVOT peak reny 0.8 m/s    Left Ventricular Outflow Tract Mean Velocity 0.56 cm/s    Left Ventricular Outflow Tract Mean Gradient 1.40 mmHg    RV- reyes basal diam 3.2 cm    TAPSE 1.3 cm    RV/LV Ratio 0.78 cm    LA size 3.8 cm    Left Atrium Major Axis 7.0 cm    LA Vol (MOD) 97 mL    DEJAN (MOD) 51 mL/m2    RA Major Axis 6.43 cm    AV mean gradient 4 mmHg    AV peak gradient 7 mmHg    Ao peak reny 1.3 m/s    Ao VTI 27.9 cm    LVOT peak VTI 14.8 cm    AV valve area 1.8 cm²    AV Velocity Ratio 0.62     AV index (prosthetic) 0.53     JESSICA by Velocity Ratio 2.1 cm²    MV stenosis pressure 1/2 time 56.40 ms    MV valve area p 1/2 method 3.90 cm2    Triscuspid Valve Regurgitation Peak Gradient 47 mmHg    STJ 2.6 cm    Ascending aorta 3.2 cm    Mean e' 0.10 m/s    ZLVIDS -1.79     ZLVIDD -2.55     LA area A4C 25.66 cm2    LA area A2C 32.79 cm2    RVDD 3.20 cm    DEJAN 59 mL/m2    LA Vol 111 cm3    Left Atrium Minor Axis 7.0 cm    LA WIDTH 4.9 cm    RA Width 4.2 cm    TV resting pulmonary artery pressure 54 mmHg    RV TB RVSP 11 mmHg    Est. RA pres 8 mmHg    Narrative      Left Ventricle: The left ventricle is normal in size. Normal wall   thickness. There is normal systolic function with a visually estimated   ejection fraction of 60 - 65%. Grade III diastolic dysfunction.    Right Ventricle: The right ventricle is normal in size. Wall thickness   is normal. Systolic function is normal.    Left Atrium: Severely dilated    Mitral Valve: There is mild regurgitation.    Tricuspid Valve: There is mild to moderate regurgitation.    Pulmonary Artery: The estimated pulmonary artery systolic pressure is   54 mmHg.    IVC/SVC: Intermediate venous pressure at 8 mmHg.

## 2025-04-28 NOTE — ANESTHESIA PREPROCEDURE EVALUATION
04/28/2025  Shirley Metz is a 89 y.o., female.      Pre-op Assessment    I have reviewed the Patient Summary Reports.     I have reviewed the Nursing Notes. I have reviewed the NPO Status.   I have reviewed the Medications.     Review of Systems  Anesthesia Hx:  No problems with previous Anesthesia             Denies Family Hx of Anesthesia complications.    Denies Personal Hx of Anesthesia complications.                    Social:  Non-Smoker       Hematology/Oncology:    Oncology Normal                                   EENT/Dental:  EENT/Dental Normal           Cardiovascular:     Hypertension   CAD    Dysrhythmias atrial fibrillation  CHF   PVD hyperlipidemia   ECG has been reviewed. EF 60-65%  Pulm htn                           Pulmonary:      Shortness of breath                  Renal/:  Chronic Renal Disease, CKD                Hepatic/GI:  Hepatic/GI Normal                    Musculoskeletal:  Arthritis               Neurological:  Neurology Normal                                      Endocrine:  Diabetes, type 2           Dermatological:  Skin Normal    Psych:  Psychiatric Normal                    Physical Exam  General: Well nourished, Cooperative, Alert and Oriented    Airway:  Mallampati: II   Mouth Opening: Normal  TM Distance: Normal  Tongue: Normal  Neck ROM: Normal ROM    Chest/Lungs:  Clear to auscultation, Normal Respiratory Rate    Heart:  Rate: Bradycardia        Anesthesia Plan  Type of Anesthesia, risks & benefits discussed:    Anesthesia Type: MAC  Intra-op Monitoring Plan: Standard ASA Monitors  Induction:  IV  Informed Consent: Informed consent signed with the Patient and all parties understand the risks and agree with anesthesia plan.  All questions answered.   ASA Score: 3  Day of Surgery Review of History & Physical: H&P Update referred to the surgeon/provider.I have  interviewed and examined the patient. I have reviewed the patient's H&P dated: There are no significant changes.     Ready For Surgery From Anesthesia Perspective.     .

## 2025-04-28 NOTE — ASSESSMENT & PLAN NOTE
Hold Eliquis.    No Lovenox or heparin for DVT prophylaxis.    NPO after midnight.    Last dose of Eliquis was Friday evening.    Plan for dual-chamber pacemaker in a.m..    Continue to hold Bystolic.        04/28/2025.    Dual-chamber pacemaker today.    Still have multiple pauses.

## 2025-04-28 NOTE — INTERVAL H&P NOTE
The patient has been examined and the H&P has been reviewed:    I concur with the findings and no changes have occurred since H&P was written.    Anesthesia/Surgery risks, benefits and alternative options discussed and understood by patient/family.          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

## 2025-04-28 NOTE — PLAN OF CARE
Patient remains AAOx3-4 with confusion to situation during the night. Patient remains liable with HR and rhythm as she remains in A-Flutter with intermittent pauses. Patient continues to void via the yang cathter in place. Patient NPO after midnight pending pacemaker placement today. Patient complaints of insomnia and BLE leg and foot muscle pain and cramping. PRN medications administered. Nonpharmacologic pain relief methods also attempted without any relief per patient. Chlorhexidine bath administered and sheets changed. POC reviewed with Patient.     Problem: Adult Inpatient Plan of Care  Goal: Plan of Care Review  Outcome: Progressing  Goal: Patient-Specific Goal (Individualized)  Outcome: Progressing  Goal: Absence of Hospital-Acquired Illness or Injury  Outcome: Progressing  Goal: Optimal Comfort and Wellbeing  Outcome: Progressing  Goal: Readiness for Transition of Care  Outcome: Progressing     Problem: Diabetes Comorbidity  Goal: Blood Glucose Level Within Targeted Range  Outcome: Progressing     Problem: Fall Injury Risk  Goal: Absence of Fall and Fall-Related Injury  Outcome: Progressing     Problem: Infection  Goal: Absence of Infection Signs and Symptoms  Outcome: Progressing     Problem: Skin Injury Risk Increased  Goal: Skin Health and Integrity  Outcome: Progressing

## 2025-04-28 NOTE — ASSESSMENT & PLAN NOTE
Creatine stable for now. BMP reviewed- noted Estimated Creatinine Clearance: 19.7 mL/min (A) (based on SCr of 1.9 mg/dL (H)). according to latest data. 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  Monitor UOP and serial BMP and adjust therapy as needed. Renally dose meds. Avoid nephrotoxic medications and procedures.    - SSI with scheduled glucose checks  - A1C 6.9

## 2025-04-28 NOTE — ASSESSMENT & PLAN NOTE
Patient has Diastolic (HFpEF) heart failure that is Chronic. On presentation their CHF was well compensated. Most recent BNP and echo results are listed below.  Recent Labs     04/26/25  1352   *     Latest ECHO  Results for orders placed during the hospital encounter of 07/11/22    Echo    Interpretation Summary  · Concentric remodeling and normal systolic function.  · The estimated ejection fraction is 60%.  · Normal left ventricular diastolic function.  · Normal right ventricular size with normal right ventricular systolic function.    Current Heart Failure Medications       Plan  - Monitor strict I&Os and daily weights.    - Place on telemetry  - Low sodium diet  - Place on fluid restriction of 1.5 L.   - Cardiology has been consulted  - The patient's volume status is at their baseline  - continue home diuretics as tolerated  - electrolyte monitoring

## 2025-04-28 NOTE — PROGRESS NOTES
[facility] - Intensive [facility] ([facility])  Cardiology  Progress Note    Patient Name: Shirley Metz  MRN: [id number]  Admission Date: 4/26/2025  Hospital Length of Stay: 2 days  Code Status: Full Code   Attending Physician: Darell Moore MD   Primary Care Physician: Phylicia Deleon MD  Expected Discharge Date:   Principal Problem:Symptomatic bradycardia    Subjective:     Hospital Course:   04/27/2025.    Multiple pauses.  Some up to 5 seconds.  Moved overnight from [facility] to the ICU for closer monitoring  Eliquis on hold  Tachy-ed syndrome.    In the ICU.  Has pacer pads on.    Discussed in length risks and benefits of pacemaker.      4.28.2025  Still having multiple pauses.    Longest so far about 6 seconds and symptomatic        Review of Systems   Cardiovascular:  Negative for chest pain, dyspnea on exertion, palpitations and syncope.   Neurological:  Negative for focal weakness.     Objective:     Vital Signs (Most Recent):  Temp: 99.1 °F (37.3 °C) (04/28/25 0305)  Pulse: 78 (04/28/25 0600)  Resp: (!) 24 (04/28/25 0600)  BP: 120/67 (04/28/25 0600)  SpO2: 97 % (04/28/25 0600) Vital Signs (24h Range):  Temp:  [97.8 °F (36.6 °C)-99.1 °F (37.3 °C)] 99.1 °F (37.3 °C)  Pulse:  [] 78  Resp:  [20-48] 24  SpO2:  [88 %-97 %] 97 %  BP: (100-144)/(53-84) 120/67     Weight: 83.5 kg (184 lb 1.4 oz)  Body mass index is 31.6 kg/m².     SpO2: 97 %         Intake/Output Summary (Last 24 hours) at 4/28/2025 1047  Last data filed at 4/28/2025 0600  Gross per 24 hour   Intake --   Output 3465 ml   Net -3465 ml       Lines/Drains/Airways       Drain  Duration                  Urethral Catheter 04/27/25 0715 1 day              Peripheral Intravenous Line  Duration                  Peripheral IV - Single Lumen 04/27/25 1315 20 G Distal;Posterior;Right Forearm <1 day                       Physical Exam  Vitals reviewed.   Constitutional:       Appearance: She is well-developed.   Neck:      Vascular: No carotid bruit.    Cardiovascular:      Rate and Rhythm: Normal rate and regular rhythm.      Pulses: Intact distal pulses.      Heart sounds: Normal heart sounds. No murmur heard.  Pulmonary:      Breath sounds: Normal breath sounds.   Neurological:      Mental Status: She is oriented to person, place, and time.            Significant Labs: All pertinent lab results from the last 24 hours have been reviewed. and   Recent Lab Results         04/28/25  0604   04/28/25  0300   04/27/25  2302   04/27/25 2016        Anion Gap   11           Baso #   0.05           Basophil %   0.4           BUN   44           Calcium   9.9           Chloride   97           CO2   29           Creatinine   1.9           eGFR   25  Comment: Estimated GFR calculated using the CKD-EPI creatinine (2021) equation.           Eos #   0.19           Eos %   1.6           Glucose   155           Gran # (ANC)   8.78           Hematocrit   36.8           Hemoglobin   12.2           Immature Grans (Abs)   0.04  Comment: Mild elevation in immature granulocytes is non specific and can be seen in a variety of conditions including stress response, acute inflammation, trauma and pregnancy. Correlation with other laboratory and clinical findings is essential.           Immature Granulocytes   0.3           Lymph #   1.42           Lymph %   12.3           Magnesium    2.1           MCH   33.2           MCHC   33.2           MCV   100           Mono #   1.07           Mono %   9.3           MPV   9.9           Neut %   76.1           nRBC   0           Platelet Count   216           POCT Glucose 174     123   145       Potassium   3.6           RBC   3.68           RDW   13.0           Sodium   137           WBC   11.55                   Significant Imaging: Echocardiogram: Transthoracic echo (TTE) complete (Cupid Only):   Results for orders placed or performed during the hospital encounter of 04/26/25   Echo   Result Value Ref Range    BSA 1.94 m2    LVOT stroke volume  51.2 cm3    LVIDd 4.1 3.5 - 6.0 cm    LV Systolic Volume 24 mL    LV Systolic Volume Index 12.7 mL/m2    LVIDs 2.6 2.1 - 4.0 cm    LV ESV A2C 116.35 mL    LV Diastolic Volume 73 mL    LV ESV A4C 75.27 mL    LV Diastolic Volume Index 38.62 mL/m2    Left Ventricular End Systolic Volume by Teichholz Method 23.95 mL    Left Ventricular End Diastolic Volume by Teichholz Method 72.78 mL    IVS 0.9 0.6 - 1.1 cm    LVOT diameter 2.1 cm    LVOT area 3.5 cm2    FS 36.6 28 - 44 %    Left Ventricle Relative Wall Thickness 0.49 cm    PW 1.0 0.6 - 1.1 cm    LV mass 123.0 g    LV Mass Index 65.1 g/m2    MV Peak E Jus 1.37 m/s    TDI LATERAL 0.11 m/s    TDI SEPTAL 0.09 m/s    E/E' ratio 14 m/s    MV Peak A Jus 0.36 m/s    TR Max Jus 3.4 m/s    E/A ratio 3.81     IVRT 63 msec    E wave deceleration time 194 msec    LV SEPTAL E/E' RATIO 15.2 m/s    LV LATERAL E/E' RATIO 12.5 m/s    LVOT peak jus 0.8 m/s    Left Ventricular Outflow Tract Mean Velocity 0.56 cm/s    Left Ventricular Outflow Tract Mean Gradient 1.40 mmHg    RV- reyes basal diam 3.2 cm    TAPSE 1.3 cm    RV/LV Ratio 0.78 cm    LA size 3.8 cm    Left Atrium Major Axis 7.0 cm    LA Vol (MOD) 97 mL    DEJAN (MOD) 51 mL/m2    RA Major Axis 6.43 cm    AV mean gradient 4 mmHg    AV peak gradient 7 mmHg    Ao peak jus 1.3 m/s    Ao VTI 27.9 cm    LVOT peak VTI 14.8 cm    AV valve area 1.8 cm²    AV Velocity Ratio 0.62     AV index (prosthetic) 0.53     JESSICA by Velocity Ratio 2.1 cm²    MV stenosis pressure 1/2 time 56.40 ms    MV valve area p 1/2 method 3.90 cm2    Triscuspid Valve Regurgitation Peak Gradient 47 mmHg    STJ 2.6 cm    Ascending aorta 3.2 cm    Mean e' 0.10 m/s    ZLVIDS -1.79     ZLVIDD -2.55     LA area A4C 25.66 cm2    LA area A2C 32.79 cm2    RVDD 3.20 cm    DEJAN 59 mL/m2    LA Vol 111 cm3    Left Atrium Minor Axis 7.0 cm    LA WIDTH 4.9 cm    RA Width 4.2 cm    TV resting pulmonary artery pressure 54 mmHg    RV TB RVSP 11 mmHg    Est. RA pres 8 mmHg    Narrative       Left Ventricle: The left ventricle is normal in size. Normal wall   thickness. There is normal systolic function with a visually estimated   ejection fraction of 60 - 65%. Grade III diastolic dysfunction.    Right Ventricle: The right ventricle is normal in size. Wall thickness   is normal. Systolic function is normal.    Left Atrium: Severely dilated    Mitral Valve: There is mild regurgitation.    Tricuspid Valve: There is mild to moderate regurgitation.    Pulmonary Artery: The estimated pulmonary artery systolic pressure is   54 mmHg.    IVC/SVC: Intermediate venous pressure at 8 mmHg.       Assessment and Plan:     Brief HPI:     * Symptomatic bradycardia  See tachy-ed syndrome    Chronic diastolic congestive heart failure  Patient has Diastolic (HFpEF) heart failure that is Acute on chronic. On presentation their CHF was decompensated. Evidence of decompensated CHF on presentation includes: orthopnea. The etiology of their decompensation is likely dietary indiscretion. Most recent BNP and echo results are listed below.  Recent Labs     04/26/25  1352   *     Latest ECHO  Results for orders placed during the hospital encounter of 04/26/25    Echo    Interpretation Summary    Left Ventricle: The left ventricle is normal in size. Normal wall thickness. There is normal systolic function with a visually estimated ejection fraction of 60 - 65%. Grade III diastolic dysfunction.    Right Ventricle: The right ventricle is normal in size. Wall thickness is normal. Systolic function is normal.    Left Atrium: Severely dilated    Mitral Valve: There is mild regurgitation.    Tricuspid Valve: There is mild to moderate regurgitation.    Pulmonary Artery: The estimated pulmonary artery systolic pressure is 54 mmHg.    IVC/SVC: Intermediate venous pressure at 8 mmHg.    Current Heart Failure Medications  furosemide injection 40 mg, Once, Intravenous    Plan  - Monitor strict I&Os and daily weights.    - Place  on telemetry  - Low sodium diet  - Place on fluid restriction of 1.5 L.   - Cardiology has been consulted  - The patient's volume status is at their baseline  - give 1 dose IV Lasix.    DC p.o. Lasix.            Sick sinus syndrome  See tachy-ed syndrome    Tachy-ed syndrome  Hold Eliquis.    No Lovenox or heparin for DVT prophylaxis.    NPO after midnight.    Last dose of Eliquis was Friday evening.    Plan for dual-chamber pacemaker in a.m..    Continue to hold Bystolic.        04/28/2025.    Dual-chamber pacemaker today.    Still have multiple pauses.            VTE Risk Mitigation (From admission, onward)           Ordered     IP VTE HIGH RISK PATIENT  Once         04/26/25 1621     Place sequential compression device  Until discontinued         04/26/25 1621                    Tran Nance MD  Cardiology  [facility] - Intensive [facility] ([facility])

## 2025-04-28 NOTE — HOSPITAL COURSE
4/28: PPM planned today.  04/29/2025 Back to cath lab for revision of PPM lead per Cardiology team.

## 2025-04-28 NOTE — TRANSFER OF CARE
"Anesthesia Transfer of Care Note    Patient: Shirley Metz    Procedure(s) Performed: Procedure(s) (LRB):  INSERTION, CARDIAC PACEMAKER, DUAL CHAMBER (N/A)    Patient location: Cath Lab    Anesthesia Type: MAC    Transport from OR: Transported from OR on room air with adequate spontaneous ventilation    Post pain: adequate analgesia    Post assessment: no apparent anesthetic complications    Post vital signs: stable    Level of consciousness: awake and alert    Nausea/Vomiting: no nausea/vomiting    Complications: none    Transfer of care protocol was followed      Last vitals: Visit Vitals  BP (!) 112/53   Pulse 77   Temp 37 °C (98.6 °F) (Oral)   Resp (!) 29   Ht 5' 4" (1.626 m)   Wt 83.5 kg (184 lb 1.4 oz)   LMP  (LMP Unknown)   SpO2 (!) 90%   BMI 31.60 kg/m²     "

## 2025-04-28 NOTE — HOSPITAL COURSE
04/27/2025.    Multiple pauses.  Some up to 5 seconds.  Moved overnight from tele to the ICU for closer monitoring  Eliquis on hold  Tachy-ed syndrome.    In the ICU.  Has pacer pads on.    Discussed in length risks and benefits of pacemaker.      4.28.2025  Still having multiple pauses.    Longest so far about 6 seconds and symptomatic    4/30/25 pt seen and examined today denies any CP at this time, has multiple complaints about weakness, unable to walk. Does not feel strong enough to be DC. Labs reviewed, chart reviewed, PPM site C/D/I sling in place. Can resume her bystolic as OP    May 1, 2025.    Pacemaker site looking good.    Patient may resume her home Bystolic.  Not on formulary.  Patient very particular about her medications as she has had multiple side effects and reactions/so named allergies

## 2025-04-28 NOTE — ASSESSMENT & PLAN NOTE
Patient has persistent (7 days or more) atrial fibrillation. Patient is currently fluctuates between sinus rhythm, a-fib, a-flutter. [id number] Score: 5. The patients heart rate in the last 24 hours is as follows:  Pulse  Min: 78  Max: 118     Plan  - Replete lytes with a goal of K>4, Mg >2  - Patient is not anticoagulated due to pending PPM  - Patient's afib is currently controlled  - holding AC, AVN blocking meds  - 4/28: Tachy ed syndrome, PPM planned for today

## 2025-04-28 NOTE — EICU
Virtual ICU Quality Rounds    Admit Date: 4/26/2025  Hospital Day: 2    ICU Day: 1d 5h    24H Vital Sign Range:  Temp:  [97.8 °F (36.6 °C)-99.1 °F (37.3 °C)]   Pulse:  []   Resp:  [20-48]   BP: (100-144)/(53-84)   SpO2:  [88 %-97 %]     VICU  Screening    Daily review of  line necessity(optional): Urinary catheter in place            Yang Indications : Critically ill in the intensive care unit requiring hourly monitoring     LDA reconciliation : Yes            Yang best practices : Nurse driven yang removal protocol ordered, Prompt to consider removal if no urine output in past 24hrs or ESRD, Prompt alternatives(external catheters, in/ou cath, bladder scanning), Initiate bladder management algorithm for patients with urinary retention, Yang securement device in place with an intact seal visualized, and Sheet clips are used to prevent dependent loops, keep the bag below the bladder, and keep the bag off the ground    Pressure injury prevention panel (order)

## 2025-04-28 NOTE — PROGRESS NOTES
[facility] - [facility] ([facility])  Critical Care Medicine  Progress Note    Patient Name: Shirley Metz  MRN: [id number]  Admission Date: 4/26/2025  Hospital Length of Stay: 2 days  Code Status: Full Code  Attending Provider: Darell Moore MD  Primary Care Provider: Phylicia Deleon MD   Principal Problem: Symptomatic bradycardia    Subjective:     HPI:  Shirley Metz is a 89-year-old female with a PMHx of persistent atrial fibrillation-on Eliquis, Bystolic, chronic diastolic HF, HTN, morbid obesity, glaucoma. Patient admitted to hospital medicine service after presenting to the ED yesterday (4/26) for complaints of dizziness, light headedness, blurry vision, and feeling like she might pass out onset shortly before presentation. When this did not improve, she came to the ER. She has had several ablations with Dr. Kelly and reports her baseline HR is generally 100-110. She was on Metoprolol until approximately 6 weeks ago, but was switched to Bystolic d/t issues with hypotension. She is also on Midodrine prn for hypotension, but states she has not had it in a while. Her last dose of Eliquis was 4/25 and last dose of Bystolic was night of 4/25. She denies other complaints such as f/c, chest pain/discomfort, shortness of breath, orthopnea, peripheral edema, abdominal pain, n/v/d.     In ED, was noted to have some episodes of bradycardia into 30's. ED EKG showed ST w/intermittent PVC's, no STEMI. Potassium 3.4, troponin 0.052, CXR w/naf. She is currently on no AV darryl blocking medications. Last dose SQ heparin pm of 4/26.   Sinus pauses noted since admit:   4/27:  3.178 second pause at 5:06 pm   2 second pause at 7:05 pm   3.3 second pause at 2304, VSS, pt asymptomatic at the time.   4/28:  5.7 second pause on tele at 1:59 AM, /76. Pt seen and examined by [facility]. She was awake, alert, in NAD. She did complain of dizziness and feeling like she is going to pass out during these episodes,  particularly when supine. Denies CP, SOB. Labs are pending. Repeat EKG showed atrial flutter w/variable conduction w/rate in 60's at time of acquisition. On telemetry, she is variable, mostly in 100's. All beats are generating pulse. She has been made NPO. Anticoagulation now held. She was transferred to ICU for close monitoring due to concerning pauses. Cardiology consult placed by .     Hospital/ICU Course:  4/28: PPM planned today.    Interval History: Sitting up in bed, c/o leg pain, chronic per patient. Denies chest pain, dyspnea.       Objective:     Vital Signs (Most Recent):  Temp: 99.1 °F (37.3 °C) (04/28/25 0305)  Pulse: 78 (04/28/25 0600)  Resp: (!) 24 (04/28/25 0600)  BP: 120/67 (04/28/25 0600)  SpO2: 97 % (04/28/25 0600) Vital Signs (24h Range):  Temp:  [97.8 °F (36.6 °C)-99.1 °F (37.3 °C)] 99.1 °F (37.3 °C)  Pulse:  [] 78  Resp:  [20-48] 24  SpO2:  [88 %-97 %] 97 %  BP: (100-144)/(53-84) 120/67     Weight: 83.5 kg (184 lb 1.4 oz)  Body mass index is 31.6 kg/m².      Intake/Output Summary (Last 24 hours) at 4/28/2025 1113  Last data filed at 4/28/2025 0600  Gross per 24 hour   Intake --   Output 3465 ml   Net -3465 ml        Physical Exam  Constitutional:       General: She is not in acute distress.     Appearance: She is ill-appearing.   HENT:      Head: Normocephalic and atraumatic.      Nose: Nose normal.      Mouth/Throat:      Mouth: Mucous membranes are moist.   Eyes:      Pupils: Pupils are equal, round, and reactive to light.   Cardiovascular:      Rate and Rhythm: Normal rate. Rhythm irregular.      Pulses: Normal pulses.      Heart sounds: Normal heart sounds.   Pulmonary:      Effort: Pulmonary effort is normal.      Breath sounds: Normal breath sounds.   Abdominal:      General: Bowel sounds are normal. There is no distension.      Palpations: Abdomen is soft.      Tenderness: There is no abdominal tenderness.   Musculoskeletal:      Cervical back: Normal range of motion and neck  "supple.      Right lower leg: No edema.      Left lower leg: No edema.   Skin:     General: Skin is dry.      Coloration: Skin is pale.   Neurological:      General: No focal deficit present.      Mental Status: She is alert and oriented to person, place, and time. Mental status is at baseline.      Motor: Weakness present.           Review of Systems   Respiratory:  Negative for cough, choking, shortness of breath and wheezing.    Cardiovascular:  Negative for chest pain, palpitations and leg swelling.   Musculoskeletal:         Chronic leg pain   Neurological:  Negative for dizziness and light-headedness.     Lines/Drains/Airways       Drain  Duration                  Urethral Catheter 04/27/25 0715 1 day              Peripheral Intravenous Line  Duration                  Peripheral IV - Single Lumen 04/27/25 1315 20 G Distal;Posterior;Right Forearm <1 day                    Significant Labs:    CBC/Anemia Profile:  Recent Labs   Lab 04/26/25  1352 04/27/25  0408 04/27/25  0802 04/28/25  0300   WBC 7.44 8.18  --  11.55   HGB 12.5 11.6*  --  12.2   HCT 38.1 34.6*  --  36.8*    202  --  216   * 100*  --  100*   RDW 13.2 13.2  --  13.0   FOLATE  --   --  9.3  --         Chemistries:  Recent Labs   Lab 04/26/25  1352 04/27/25  0408 04/28/25  0300    139  139 137   K 3.4* 3.2*  3.2* 3.6    100  100 97   CO2 28 28  27 29   BUN 47* 47*  48* 44*   CREATININE 1.8* 1.7*  1.7* 1.9*   CALCIUM 9.5 9.2  9.1 9.9   ALBUMIN 3.9  --   --    BILITOT 0.6  --   --    ALKPHOS 58  --   --    ALT 17  --   --    AST 22  --   --    GLUCOSE 183* 188*  185* 155*   MG  --  2.0 2.1   PHOS  --  3.2  3.2  --        All pertinent labs within the past 24 hours have been reviewed.    Significant Imaging:  I have reviewed all pertinent imaging results/findings within the past 24 hours.    ABG  No results for input(s): "PH", "PO2", "PCO2", "HCO3", "BE" in the last 168 hours.  Assessment/Plan:     Assessment & " Plan  Symptomatic bradycardia  - rate variable, noted pauses on telemetry  - holding anticoagulation  - labs in process, replace electrolytes as indicated  - TSH pending  - continuous telemetry  - echo ordered  - cardiology consult placed  - NPO pending cards eval    4/28: PPM planned today.  HTN (hypertension)  Patient's blood pressure range in the last 24 hours was: BP  Min: 100/72  Max: 144/84.The patient's inpatient anti-hypertensive regimen is listed below:  Current Antihypertensives       Plan  - BP is controlled, no changes needed to their regimen  - no AVN blocking meds  - will utilize prn IV medications if needed  COAG (chronic open-angle glaucoma) - Both Eyes  - continue home ophthalmic drops (non-BB)    Obesity (BMI 30.0-34.9)  Body mass index is 31.6 kg/m². Morbid obesity complicates all aspects of disease management from diagnostic modalities to treatment. Weight loss encouraged and health benefits explained to patient.     Chronic diastolic CHF (congestive heart failure)  Patient has Diastolic (HFpEF) heart failure that is Chronic. On presentation their CHF was well compensated. Most recent BNP and echo results are listed below.  Recent Labs     04/26/25  1352   *     Latest ECHO  Results for orders placed during the hospital encounter of 07/11/22    Echo    Interpretation Summary  · Concentric remodeling and normal systolic function.  · The estimated ejection fraction is 60%.  · Normal left ventricular diastolic function.  · Normal right ventricular size with normal right ventricular systolic function.    Current Heart Failure Medications       Plan  - Monitor strict I&Os and daily weights.    - Place on telemetry  - Low sodium diet  - Place on fluid restriction of 1.5 L.   - Cardiology has been consulted  - The patient's volume status is at their baseline  - continue home diuretics as tolerated  - electrolyte monitoring      Persistent atrial fibrillation  Tachy-ed syndrome  Sick sinus  syndrome  Patient has persistent (7 days or more) atrial fibrillation. Patient is currently fluctuates between sinus rhythm, a-fib, a-flutter. [id number] Score: 5. The patients heart rate in the last 24 hours is as follows:  Pulse  Min: 78  Max: 118     Plan  - Replete lytes with a goal of K>4, Mg >2  - Patient is not anticoagulated due to pending PPM  - Patient's afib is currently controlled  - holding AC, AVN blocking meds  - 4/28: Tachy ed syndrome, PPM planned for today    Type 2 diabetes mellitus with chronic kidney disease, without long-term current use of insulin  Creatine stable for now. BMP reviewed- noted Estimated Creatinine Clearance: 19.7 mL/min (A) (based on SCr of 1.9 mg/dL (H)). according to latest data. 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  Monitor UOP and serial BMP and adjust therapy as needed. Renally dose meds. Avoid nephrotoxic medications and procedures.    - SSI with scheduled glucose checks  - A1C 6.9    Critical Care Time: 35 minutes  Critical secondary to Patient has a condition that poses threat to life and bodily function: Symptomatic bradycardia      Critical care was time spent personally by me on the following activities: development of treatment plan with patient or surrogate and bedside caregivers, discussions with consultants, evaluation of patient's response to treatment, examination of patient, ordering and performing treatments and interventions, ordering and review of laboratory studies, ordering and review of radiographic studies, pulse oximetry, re-evaluation of patient's condition. This critical care time did not overlap with that of any other provider or involve time for any procedures.     Pily Rodriguez NP  Critical Care Medicine  [facility] ([facility])

## 2025-04-28 NOTE — ASSESSMENT & PLAN NOTE
Patient has persistent (7 days or more) atrial fibrillation. Patient is currently fluctuates between sinus rhythm, a-fib, a-flutter. [id number] Score: 5. The patients heart rate in the last 24 hours is as follows:  Pulse  Min: 78  Max: 118     Plan  - Replete lytes with a goal of K>4, Mg >2  - Patient is not anticoagulated due to pending PPM  - Patient's afib is currently controlled  - holding AC, AVN blocking meds  - 4/28: Tachy ed syndrome, PPM planned for today

## 2025-04-28 NOTE — ASSESSMENT & PLAN NOTE
Patient's blood pressure range in the last 24 hours was: BP  Min: 100/72  Max: 144/84.The patient's inpatient anti-hypertensive regimen is listed below:  Current Antihypertensives       Plan  - BP is controlled, no changes needed to their regimen  - no AVN blocking meds  - will utilize prn IV medications if needed

## 2025-04-28 NOTE — SUBJECTIVE & OBJECTIVE
Interval History: Sitting up in bed, c/o leg pain, chronic per patient. Denies chest pain, dyspnea.       Objective:     Vital Signs (Most Recent):  Temp: 99.1 °F (37.3 °C) (04/28/25 0305)  Pulse: 78 (04/28/25 0600)  Resp: (!) 24 (04/28/25 0600)  BP: 120/67 (04/28/25 0600)  SpO2: 97 % (04/28/25 0600) Vital Signs (24h Range):  Temp:  [97.8 °F (36.6 °C)-99.1 °F (37.3 °C)] 99.1 °F (37.3 °C)  Pulse:  [] 78  Resp:  [20-48] 24  SpO2:  [88 %-97 %] 97 %  BP: (100-144)/(53-84) 120/67     Weight: 83.5 kg (184 lb 1.4 oz)  Body mass index is 31.6 kg/m².      Intake/Output Summary (Last 24 hours) at 4/28/2025 1113  Last data filed at 4/28/2025 0600  Gross per 24 hour   Intake --   Output 3465 ml   Net -3465 ml        Physical Exam  Constitutional:       General: She is not in acute distress.     Appearance: She is ill-appearing.   HENT:      Head: Normocephalic and atraumatic.      Nose: Nose normal.      Mouth/Throat:      Mouth: Mucous membranes are moist.   Eyes:      Pupils: Pupils are equal, round, and reactive to light.   Cardiovascular:      Rate and Rhythm: Normal rate. Rhythm irregular.      Pulses: Normal pulses.      Heart sounds: Normal heart sounds.   Pulmonary:      Effort: Pulmonary effort is normal.      Breath sounds: Normal breath sounds.   Abdominal:      General: Bowel sounds are normal. There is no distension.      Palpations: Abdomen is soft.      Tenderness: There is no abdominal tenderness.   Musculoskeletal:      Cervical back: Normal range of motion and neck supple.      Right lower leg: No edema.      Left lower leg: No edema.   Skin:     General: Skin is dry.      Coloration: Skin is pale.   Neurological:      General: No focal deficit present.      Mental Status: She is alert and oriented to person, place, and time. Mental status is at baseline.      Motor: Weakness present.           Review of Systems   Respiratory:  Negative for cough, choking, shortness of breath and wheezing.     Cardiovascular:  Negative for chest pain, palpitations and leg swelling.   Musculoskeletal:         Chronic leg pain   Neurological:  Negative for dizziness and light-headedness.     Lines/Drains/Airways       Drain  Duration                  Urethral Catheter 04/27/25 0715 1 day              Peripheral Intravenous Line  Duration                  Peripheral IV - Single Lumen 04/27/25 1315 20 G Distal;Posterior;Right Forearm <1 day                    Significant Labs:    CBC/Anemia Profile:  Recent Labs   Lab 04/26/25  1352 04/27/25  0408 04/27/25  0802 04/28/25  0300   WBC 7.44 8.18  --  11.55   HGB 12.5 11.6*  --  12.2   HCT 38.1 34.6*  --  36.8*    202  --  216   * 100*  --  100*   RDW 13.2 13.2  --  13.0   FOLATE  --   --  9.3  --         Chemistries:  Recent Labs   Lab 04/26/25  1352 04/27/25  0408 04/28/25  0300    139  139 137   K 3.4* 3.2*  3.2* 3.6    100  100 97   CO2 28 28  27 29   BUN 47* 47*  48* 44*   CREATININE 1.8* 1.7*  1.7* 1.9*   CALCIUM 9.5 9.2  9.1 9.9   ALBUMIN 3.9  --   --    BILITOT 0.6  --   --    ALKPHOS 58  --   --    ALT 17  --   --    AST 22  --   --    GLUCOSE 183* 188*  185* 155*   MG  --  2.0 2.1   PHOS  --  3.2  3.2  --        All pertinent labs within the past 24 hours have been reviewed.    Significant Imaging:  I have reviewed all pertinent imaging results/findings within the past 24 hours.

## 2025-04-28 NOTE — ASSESSMENT & PLAN NOTE
Patient has persistent (7 days or more) atrial fibrillation. Patient is currently fluctuates between sinus rhythm, a-fib, a-flutter. [id number] Score: 5. The patients heart rate in the last 24 hours is as follows:  Pulse  Min: 78  Max: 118     Plan  - Replete lytes with a goal of K>4, Mg >2  - Patient is not anticoagulated due to pending PPM  - Patient's afib is currently controlled  - holding AC, AVN blocking meds  - 4/28: Tachy ed syndrome, PPM planned for today

## 2025-04-29 LAB
ABSOLUTE EOSINOPHIL (OHS): 0.15 K/UL
ABSOLUTE MONOCYTE (OHS): 1.41 K/UL (ref 0.3–1)
ABSOLUTE NEUTROPHIL COUNT (OHS): 7.39 K/UL (ref 1.8–7.7)
ANION GAP (OHS): 13 MMOL/L (ref 8–16)
BASOPHILS # BLD AUTO: 0.04 K/UL
BASOPHILS NFR BLD AUTO: 0.4 %
BUN SERPL-MCNC: 52 MG/DL (ref 10–30)
CALCIUM SERPL-MCNC: 9.6 MG/DL (ref 8.7–10.5)
CHLORIDE SERPL-SCNC: 99 MMOL/L (ref 95–110)
CO2 SERPL-SCNC: 26 MMOL/L (ref 23–29)
CREAT SERPL-MCNC: 2 MG/DL (ref 0.5–1.4)
ERYTHROCYTE [DISTWIDTH] IN BLOOD BY AUTOMATED COUNT: 13.2 % (ref 11.5–14.5)
GFR SERPLBLD CREATININE-BSD FMLA CKD-EPI: 23 ML/MIN/1.73/M2
GLUCOSE SERPL-MCNC: 132 MG/DL (ref 70–110)
HCT VFR BLD AUTO: 34.8 % (ref 37–48.5)
HGB BLD-MCNC: 11.7 GM/DL (ref 12–16)
IMM GRANULOCYTES # BLD AUTO: 0.06 K/UL (ref 0–0.04)
IMM GRANULOCYTES NFR BLD AUTO: 0.6 % (ref 0–0.5)
LYMPHOCYTES # BLD AUTO: 1.64 K/UL (ref 1–4.8)
MAGNESIUM SERPL-MCNC: 2.3 MG/DL (ref 1.6–2.6)
MCH RBC QN AUTO: 33.8 PG (ref 27–31)
MCHC RBC AUTO-ENTMCNC: 33.6 G/DL (ref 32–36)
MCV RBC AUTO: 101 FL (ref 82–98)
NUCLEATED RBC (/100WBC) (OHS): 0 /100 WBC
OHS QRS DURATION: 84 MS
OHS QTC CALCULATION: 443 MS
PHOSPHATE SERPL-MCNC: 4.2 MG/DL (ref 2.7–4.5)
PLATELET # BLD AUTO: 190 K/UL (ref 150–450)
PMV BLD AUTO: 10 FL (ref 9.2–12.9)
POCT GLUCOSE: 132 MG/DL (ref 70–110)
POCT GLUCOSE: 180 MG/DL (ref 70–110)
POCT GLUCOSE: 189 MG/DL (ref 70–110)
POCT GLUCOSE: 194 MG/DL (ref 70–110)
POTASSIUM SERPL-SCNC: 4.4 MMOL/L (ref 3.5–5.1)
RBC # BLD AUTO: 3.46 M/UL (ref 4–5.4)
RELATIVE EOSINOPHIL (OHS): 1.4 %
RELATIVE LYMPHOCYTE (OHS): 15.3 % (ref 18–48)
RELATIVE MONOCYTE (OHS): 13.2 % (ref 4–15)
RELATIVE NEUTROPHIL (OHS): 69.1 % (ref 38–73)
SODIUM SERPL-SCNC: 138 MMOL/L (ref 136–145)
WBC # BLD AUTO: 10.69 K/UL (ref 3.9–12.7)

## 2025-04-29 PROCEDURE — 33215 REPOSITION PACING-DEFIB LEAD: CPT | Mod: 78,HCNC,, | Performed by: INTERNAL MEDICINE

## 2025-04-29 PROCEDURE — 85025 COMPLETE CBC W/AUTO DIFF WBC: CPT | Mod: HCNC

## 2025-04-29 PROCEDURE — 25000003 PHARM REV CODE 250: Mod: HCNC | Performed by: INTERNAL MEDICINE

## 2025-04-29 PROCEDURE — 02WA3MZ REVISION OF CARDIAC LEAD IN HEART, PERCUTANEOUS APPROACH: ICD-10-PCS | Performed by: INTERNAL MEDICINE

## 2025-04-29 PROCEDURE — 25000003 PHARM REV CODE 250: Mod: HCNC | Performed by: NURSE PRACTITIONER

## 2025-04-29 PROCEDURE — 33215 REPOSITION PACING-DEFIB LEAD: CPT | Mod: 78,HCNC | Performed by: INTERNAL MEDICINE

## 2025-04-29 PROCEDURE — 27201423 OPTIME MED/SURG SUP & DEVICES STERILE SUPPLY: Mod: HCNC | Performed by: INTERNAL MEDICINE

## 2025-04-29 PROCEDURE — 21400001 HC TELEMETRY ROOM: Mod: HCNC

## 2025-04-29 PROCEDURE — 83735 ASSAY OF MAGNESIUM: CPT | Mod: HCNC

## 2025-04-29 PROCEDURE — 63600175 PHARM REV CODE 636 W HCPCS: Mod: HCNC | Performed by: INTERNAL MEDICINE

## 2025-04-29 PROCEDURE — 36415 COLL VENOUS BLD VENIPUNCTURE: CPT | Mod: HCNC

## 2025-04-29 PROCEDURE — 84100 ASSAY OF PHOSPHORUS: CPT | Mod: HCNC

## 2025-04-29 PROCEDURE — 80048 BASIC METABOLIC PNL TOTAL CA: CPT | Mod: HCNC

## 2025-04-29 PROCEDURE — 99153 MOD SED SAME PHYS/QHP EA: CPT | Mod: HCNC | Performed by: INTERNAL MEDICINE

## 2025-04-29 PROCEDURE — 25000003 PHARM REV CODE 250: Mod: HCNC

## 2025-04-29 PROCEDURE — 99152 MOD SED SAME PHYS/QHP 5/>YRS: CPT | Mod: HCNC | Performed by: INTERNAL MEDICINE

## 2025-04-29 RX ORDER — FENTANYL CITRATE 50 UG/ML
INJECTION, SOLUTION INTRAMUSCULAR; INTRAVENOUS
Status: DISCONTINUED | OUTPATIENT
Start: 2025-04-29 | End: 2025-04-29 | Stop reason: HOSPADM

## 2025-04-29 RX ORDER — CEFAZOLIN SODIUM 1 G/3ML
INJECTION, POWDER, FOR SOLUTION INTRAMUSCULAR; INTRAVENOUS
Status: DISCONTINUED | OUTPATIENT
Start: 2025-04-29 | End: 2025-04-29 | Stop reason: HOSPADM

## 2025-04-29 RX ORDER — MIDAZOLAM HYDROCHLORIDE 1 MG/ML
INJECTION INTRAMUSCULAR; INTRAVENOUS
Status: DISCONTINUED | OUTPATIENT
Start: 2025-04-29 | End: 2025-04-29 | Stop reason: HOSPADM

## 2025-04-29 RX ORDER — LIDOCAINE HYDROCHLORIDE 20 MG/ML
INJECTION, SOLUTION EPIDURAL; INFILTRATION; INTRACAUDAL; PERINEURAL
Status: DISCONTINUED | OUTPATIENT
Start: 2025-04-29 | End: 2025-04-29 | Stop reason: HOSPADM

## 2025-04-29 RX ADMIN — SODIUM CHLORIDE 500 ML: 9 INJECTION, SOLUTION INTRAVENOUS at 08:04

## 2025-04-29 RX ADMIN — CEPHALEXIN 500 MG: 500 CAPSULE ORAL at 04:04

## 2025-04-29 RX ADMIN — CEPHALEXIN 500 MG: 500 CAPSULE ORAL at 09:04

## 2025-04-29 RX ADMIN — ACETAMINOPHEN 650 MG: 325 TABLET ORAL at 10:04

## 2025-04-29 RX ADMIN — CEPHALEXIN 500 MG: 500 CAPSULE ORAL at 05:04

## 2025-04-29 RX ADMIN — MUPIROCIN: 20 OINTMENT TOPICAL at 09:04

## 2025-04-29 RX ADMIN — LIDOCAINE 5% 1 PATCH: 700 PATCH TOPICAL at 11:04

## 2025-04-29 RX ADMIN — HYPROMELLOSE 2910 1 DROP: 5 SOLUTION/ DROPS OPHTHALMIC at 05:04

## 2025-04-29 RX ADMIN — ACETAMINOPHEN 650 MG: 325 TABLET ORAL at 04:04

## 2025-04-29 RX ADMIN — ACETAMINOPHEN 650 MG: 325 TABLET ORAL at 08:04

## 2025-04-29 RX ADMIN — MUPIROCIN: 20 OINTMENT TOPICAL at 08:04

## 2025-04-29 RX ADMIN — FAMOTIDINE 20 MG: 20 TABLET, FILM COATED ORAL at 08:04

## 2025-04-29 NOTE — ASSESSMENT & PLAN NOTE
Patient has persistent (7 days or more) atrial fibrillation. Patient is currently fluctuates between sinus rhythm, a-fib, a-flutter. [id number] Score: 5. The patients heart rate in the last 24 hours is as follows:  Pulse  Min: 63  Max: 78     Plan  - Replete lytes with a goal of K>4, Mg >2  - Patient is not anticoagulated due to pending PPM  - Patient's afib is currently controlled  - holding AC, AVN blocking meds  - 4/28: Tachy ed syndrome, PPM planned for today  - 4/29: Revision of PPM lead today

## 2025-04-29 NOTE — ASSESSMENT & PLAN NOTE
Patient has persistent (7 days or more) atrial fibrillation. Patient is currently fluctuates between sinus rhythm, a-fib, a-flutter. [id number] Score: 5. The patients heart rate in the last 24 hours is as follows:  Pulse  Min: 63  Max: 78     Plan  - Replete lytes with a goal of K>4, Mg >2  - Patient is not anticoagulated due to pending PPM  - Patient's afib is currently controlled  - holding AC, AVN blocking meds  - 4/28: Tachy ed syndrome, PPM planned for today  - 4/29: Revision of PPM lead today

## 2025-04-29 NOTE — ASSESSMENT & PLAN NOTE
Patient has persistent (7 days or more) atrial fibrillation. Patient is currently fluctuates between sinus rhythm, a-fib, a-flutter. [id number] Score: 5. The patients heart rate in the last 24 hours is as follows:  Pulse  Min: 63  Max: 78     Plan  - Replete lytes with a goal of K>4, Mg >2  - Patient is not anticoagulated due to pending PPM  - Patient's afib is currently controlled  - holding AC, AVN blocking meds  - 4/28: Tachy ed syndrome, PPM planned for today  - 4/29: Revision of PPM lead today

## 2025-04-29 NOTE — PLAN OF CARE
Patient remains AAOx3-4 with confusion to situation during the night. Patient HR and rhythm remains consistent NSR. Patient continues to have marginal UOP via the yang catheter in place. Patient's L arm remains in sling and incision site remains CDI. Patient NPO after midnight pending pacemaker placement today. Patient complaints of insomnia and BLE leg and foot muscle pain and cramping. PRN medications administered. Nonpharmacologic pain relief methods also attempted without any relief per patient. Chlorhexidine bath administered and sheets changed. POC reviewed with Patient.         Problem: Adult Inpatient Plan of Care  Goal: Plan of Care Review  Outcome: Progressing  Goal: Patient-Specific Goal (Individualized)  Outcome: Progressing  Goal: Absence of Hospital-Acquired Illness or Injury  Outcome: Progressing  Goal: Optimal Comfort and Wellbeing  Outcome: Progressing  Goal: Readiness for Transition of Care  Outcome: Progressing     Problem: Diabetes Comorbidity  Goal: Blood Glucose Level Within Targeted Range  Outcome: Progressing     Problem: Infection  Goal: Absence of Infection Signs and Symptoms  Outcome: Progressing     Problem: Skin Injury Risk Increased  Goal: Skin Health and Integrity  Outcome: Progressing     Problem: Wound  Goal: Optimal Coping  Outcome: Progressing  Goal: Optimal Functional Ability  Outcome: Progressing  Goal: Absence of Infection Signs and Symptoms  Outcome: Progressing  Goal: Improved Oral Intake  Outcome: Progressing  Goal: Optimal Pain Control and Function  Outcome: Progressing  Goal: Skin Health and Integrity  Outcome: Progressing  Goal: Optimal Wound Healing  Outcome: Progressing

## 2025-04-29 NOTE — PLAN OF CARE
POC reviewed with pt. Pt verbalizes understanding of POC. No questions at this time.  AAOx4. NADN.  Paced on cardiac monitor 8615.  Pt remains free of falls.  No complaints at this time.  Safety measures in place. Will continue to monitor.  Informed pt to call for assistance before getting up. Pt verbalizes understanding.  Hourly rounding and chart check complete.   Bed in lowest position, wheels locked, side rails up x2,call light in reach.    Problem: Adult Inpatient Plan of Care  Goal: Plan of Care Review  Outcome: Progressing  Goal: Patient-Specific Goal (Individualized)  Outcome: Progressing  Goal: Absence of Hospital-Acquired Illness or Injury  Outcome: Progressing  Goal: Optimal Comfort and Wellbeing  Outcome: Progressing  Goal: Readiness for Transition of Care  Outcome: Progressing     Problem: Diabetes Comorbidity  Goal: Blood Glucose Level Within Targeted Range  Outcome: Progressing     Problem: Fall Injury Risk  Goal: Absence of Fall and Fall-Related Injury  Outcome: Progressing     Problem: Infection  Goal: Absence of Infection Signs and Symptoms  Outcome: Progressing     Problem: Skin Injury Risk Increased  Goal: Skin Health and Integrity  Outcome: Progressing     Problem: Wound  Goal: Optimal Coping  Outcome: Progressing  Goal: Optimal Functional Ability  Outcome: Progressing  Goal: Absence of Infection Signs and Symptoms  Outcome: Progressing  Goal: Improved Oral Intake  Outcome: Progressing  Goal: Optimal Pain Control and Function  Outcome: Progressing  Goal: Skin Health and Integrity  Outcome: Progressing  Goal: Optimal Wound Healing  Outcome: Progressing

## 2025-04-29 NOTE — ASSESSMENT & PLAN NOTE
Creatine stable for now. BMP reviewed- noted Estimated Creatinine Clearance: 18.8 mL/min (A) (based on SCr of 2 mg/dL (H)). according to latest data. 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  Monitor UOP and serial BMP and adjust therapy as needed. Renally dose meds. Avoid nephrotoxic medications and procedures.    - SSI with scheduled glucose checks  - A1C 6.9

## 2025-04-29 NOTE — EICU
Intervention Initiated From:  [facility] / [facility]    Deborah intervened regarding:  Rounding (Video assessment)  VICU Night Rounds Checklist  24H Vital Sign Range:  Temp:  [98.2 °F (36.8 °C)-99.1 °F (37.3 °C)]   Pulse:  []   Resp:  [20-48]   BP: (100-144)/(52-84)   SpO2:  [88 %-97 %]     Video rounds

## 2025-04-29 NOTE — ASSESSMENT & PLAN NOTE
Patient's blood pressure range in the last 24 hours was: BP  Min: 86/41  Max: 131/62.The patient's inpatient anti-hypertensive regimen is listed below:  Current Antihypertensives   On Hold    Plan  - BP is controlled, no changes needed to their regimen  - no AVN blocking meds  - will utilize prn IV medications if needed

## 2025-04-29 NOTE — SUBJECTIVE & OBJECTIVE
Interval History: Sitting up in bed, c/o L arm pain, nurse adjusting sling.    Objective:     Vital Signs (Most Recent):  Temp: 98.2 °F (36.8 °C) (04/29/25 0305)  Pulse: 72 (04/29/25 0600)  Resp: (!) 26 (04/29/25 0600)  BP: 130/77 (04/29/25 0600)  SpO2: 98 % (04/29/25 0600) Vital Signs (24h Range):  Temp:  [98.2 °F (36.8 °C)-100 °F (37.8 °C)] 98.2 °F (36.8 °C)  Pulse:  [63-78] 72  Resp:  [21-37] 26  SpO2:  [85 %-98 %] 98 %  BP: ()/(41-77) 130/77     Weight: 83.5 kg (184 lb 1.4 oz)  Body mass index is 31.6 kg/m².      Intake/Output Summary (Last 24 hours) at 4/29/2025 1023  Last data filed at 4/29/2025 0600  Gross per 24 hour   Intake 580 ml   Output 625 ml   Net -45 ml        Physical Exam  Constitutional:       General: She is not in acute distress.     Appearance: She is ill-appearing.   HENT:      Head: Normocephalic and atraumatic.      Nose: Nose normal.      Mouth/Throat:      Mouth: Mucous membranes are moist.   Eyes:      Pupils: Pupils are equal, round, and reactive to light.   Cardiovascular:      Rate and Rhythm: Normal rate. Rhythm irregular.      Pulses: Normal pulses.      Heart sounds: Normal heart sounds.   Pulmonary:      Effort: Pulmonary effort is normal.      Breath sounds: Normal breath sounds.   Abdominal:      General: Bowel sounds are normal. There is no distension.      Palpations: Abdomen is soft.      Tenderness: There is no abdominal tenderness.   Musculoskeletal:      Cervical back: Normal range of motion and neck supple.      Right lower leg: No edema.      Left lower leg: No edema.      Comments: LUE in sling post PPM   Skin:     General: Skin is dry.      Coloration: Skin is pale.   Neurological:      General: No focal deficit present.      Mental Status: She is alert and oriented to person, place, and time. Mental status is at baseline.      Motor: Weakness present.           Review of Systems   Respiratory:  Negative for cough, choking, shortness of breath and wheezing.     Cardiovascular:  Negative for chest pain, palpitations and leg swelling.   Musculoskeletal:         LUE pain (post PPM)  Chronic leg pain   Neurological:  Negative for dizziness and light-headedness.     Lines/Drains/Airways       Drain  Duration                  Urethral Catheter 04/27/25 0715 2 days              Peripheral Intravenous Line  Duration                  Peripheral IV - Single Lumen 04/27/25 1315 20 G Distal;Posterior;Right Forearm 1 day                    Significant Labs:    CBC/Anemia Profile:  Recent Labs   Lab 04/28/25  0300 04/29/25  0513   WBC 11.55 10.69   HGB 12.2 11.7*   HCT 36.8* 34.8*    190   * 101*   RDW 13.0 13.2        Chemistries:  Recent Labs   Lab 04/28/25  0300 04/29/25  0513    138   K 3.6 4.4   CL 97 99   CO2 29 26   BUN 44* 52*   CREATININE 1.9* 2.0*   CALCIUM 9.9 9.6   MG 2.1 2.3   PHOS  --  4.2       All pertinent labs within the past 24 hours have been reviewed.    Significant Imaging:  I have reviewed all pertinent imaging results/findings within the past 24 hours.

## 2025-04-29 NOTE — PROGRESS NOTES
[facility] ([facility])  Critical Care Medicine  Progress Note    Patient Name: Shirley Metz  MRN: [id number]  Admission Date: 4/26/2025  Hospital Length of Stay: 3 days  Code Status: Full Code  Attending Provider: Darell Moore MD  Primary Care Provider: Phylicia Deleon MD   Principal Problem: Symptomatic bradycardia    Subjective:     HPI:  Shirley Mtez is a 89-year-old female with a PMHx of persistent atrial fibrillation-on Eliquis, Bystolic, chronic diastolic HF, HTN, morbid obesity, glaucoma. Patient admitted to hospital medicine service after presenting to the ED yesterday (4/26) for complaints of dizziness, light headedness, blurry vision, and feeling like she might pass out onset shortly before presentation. When this did not improve, she came to the ER. She has had several ablations with Dr. Kelly and reports her baseline HR is generally 100-110. She was on Metoprolol until approximately 6 weeks ago, but was switched to Bystolic d/t issues with hypotension. She is also on Midodrine prn for hypotension, but states she has not had it in a while. Her last dose of Eliquis was 4/25 and last dose of Bystolic was night of 4/25. She denies other complaints such as f/c, chest pain/discomfort, shortness of breath, orthopnea, peripheral edema, abdominal pain, n/v/d.     In ED, was noted to have some episodes of bradycardia into 30's. ED EKG showed ST w/intermittent PVC's, no STEMI. Potassium 3.4, troponin 0.052, CXR w/naf. She is currently on no AV darryl blocking medications. Last dose SQ heparin pm of 4/26.   Sinus pauses noted since admit:   4/27:  3.178 second pause at 5:06 pm   2 second pause at 7:05 pm   3.3 second pause at 2304, VSS, pt asymptomatic at the time.   4/28:  5.7 second pause on tele at 1:59 AM, /76. Pt seen and examined by [facility]. She was awake, alert, in NAD. She did complain of dizziness and feeling like she is going to pass out during these episodes, particularly  when supine. Denies CP, SOB. Labs are pending. Repeat EKG showed atrial flutter w/variable conduction w/rate in 60's at time of acquisition. On telemetry, she is variable, mostly in 100's. All beats are generating pulse. She has been made NPO. Anticoagulation now held. She was transferred to ICU for close monitoring due to concerning pauses. Cardiology consult placed by .     Hospital/ICU Course:  4/28: PPM planned today.  04/29/2025 Back to cath lab for revision of PPM lead per Cardiology team.     Interval History: Sitting up in bed, c/o L arm pain, nurse adjusting sling.    Objective:     Vital Signs (Most Recent):  Temp: 98.2 °F (36.8 °C) (04/29/25 0305)  Pulse: 72 (04/29/25 0600)  Resp: (!) 26 (04/29/25 0600)  BP: 130/77 (04/29/25 0600)  SpO2: 98 % (04/29/25 0600) Vital Signs (24h Range):  Temp:  [98.2 °F (36.8 °C)-100 °F (37.8 °C)] 98.2 °F (36.8 °C)  Pulse:  [63-78] 72  Resp:  [21-37] 26  SpO2:  [85 %-98 %] 98 %  BP: ()/(41-77) 130/77     Weight: 83.5 kg (184 lb 1.4 oz)  Body mass index is 31.6 kg/m².      Intake/Output Summary (Last 24 hours) at 4/29/2025 1023  Last data filed at 4/29/2025 0600  Gross per 24 hour   Intake 580 ml   Output 625 ml   Net -45 ml        Physical Exam  Constitutional:       General: She is not in acute distress.     Appearance: She is ill-appearing.   HENT:      Head: Normocephalic and atraumatic.      Nose: Nose normal.      Mouth/Throat:      Mouth: Mucous membranes are moist.   Eyes:      Pupils: Pupils are equal, round, and reactive to light.   Cardiovascular:      Rate and Rhythm: Normal rate. Rhythm irregular.      Pulses: Normal pulses.      Heart sounds: Normal heart sounds.   Pulmonary:      Effort: Pulmonary effort is normal.      Breath sounds: Normal breath sounds.   Abdominal:      General: Bowel sounds are normal. There is no distension.      Palpations: Abdomen is soft.      Tenderness: There is no abdominal tenderness.   Musculoskeletal:      Cervical back:  "Normal range of motion and neck supple.      Right lower leg: No edema.      Left lower leg: No edema.      Comments: LUE in sling post PPM   Skin:     General: Skin is dry.      Coloration: Skin is pale.   Neurological:      General: No focal deficit present.      Mental Status: She is alert and oriented to person, place, and time. Mental status is at baseline.      Motor: Weakness present.           Review of Systems   Respiratory:  Negative for cough, choking, shortness of breath and wheezing.    Cardiovascular:  Negative for chest pain, palpitations and leg swelling.   Musculoskeletal:         LUE pain (post PPM)  Chronic leg pain   Neurological:  Negative for dizziness and light-headedness.     Lines/Drains/Airways       Drain  Duration                  Urethral Catheter 04/27/25 0715 2 days              Peripheral Intravenous Line  Duration                  Peripheral IV - Single Lumen 04/27/25 1315 20 G Distal;Posterior;Right Forearm 1 day                    Significant Labs:    CBC/Anemia Profile:  Recent Labs   Lab 04/28/25  0300 04/29/25  0513   WBC 11.55 10.69   HGB 12.2 11.7*   HCT 36.8* 34.8*    190   * 101*   RDW 13.0 13.2        Chemistries:  Recent Labs   Lab 04/28/25  0300 04/29/25  0513    138   K 3.6 4.4   CL 97 99   CO2 29 26   BUN 44* 52*   CREATININE 1.9* 2.0*   CALCIUM 9.9 9.6   MG 2.1 2.3   PHOS  --  4.2       All pertinent labs within the past 24 hours have been reviewed.    Significant Imaging:  I have reviewed all pertinent imaging results/findings within the past 24 hours.    ABG  No results for input(s): "PH", "PO2", "PCO2", "HCO3", "BE" in the last 168 hours.  Assessment/Plan:     Assessment & Plan  Symptomatic bradycardia  - rate variable, noted pauses on telemetry  - holding anticoagulation  - labs in process, replace electrolytes as indicated  - TSH pending  - continuous telemetry  - echo ordered  - cardiology consult placed  - NPO pending cards eval    4/28: PPM " planned today.  4/29: Back to cath lab for revision of PPM lead, was dislodged from R ventricle. Continue sling post op per Cardiology team.   HTN (hypertension)  Patient's blood pressure range in the last 24 hours was: BP  Min: 86/41  Max: 131/62.The patient's inpatient anti-hypertensive regimen is listed below:  Current Antihypertensives   On Hold    Plan  - BP is controlled, no changes needed to their regimen  - no AVN blocking meds  - will utilize prn IV medications if needed  COAG (chronic open-angle glaucoma) - Both Eyes  - continue home ophthalmic drops (non-BB)  Obesity (BMI 30.0-34.9)  Body mass index is 31.6 kg/m². Morbid obesity complicates all aspects of disease management from diagnostic modalities to treatment. Weight loss encouraged and health benefits explained to patient.     Chronic diastolic CHF (congestive heart failure)  Patient has Diastolic (HFpEF) heart failure that is Chronic. On presentation their CHF was well compensated. Most recent BNP and echo results are listed below.  Recent Labs     04/26/25  1352   *     Latest ECHO  Results for orders placed during the hospital encounter of 07/11/22    Echo    Interpretation Summary  · Concentric remodeling and normal systolic function.  · The estimated ejection fraction is 60%.  · Normal left ventricular diastolic function.  · Normal right ventricular size with normal right ventricular systolic function.    Current Heart Failure Medications       Plan  - Monitor strict I&Os and daily weights.    - Place on telemetry  - Low sodium diet  - Place on fluid restriction of 1.5 L.   - Cardiology has been consulted  - The patient's volume status is at their baseline  - continue home diuretics as tolerated  - electrolyte monitoring  Persistent atrial fibrillation  Tachy-ed syndrome  Sick sinus syndrome  Patient has persistent (7 days or more) atrial fibrillation. Patient is currently fluctuates between sinus rhythm, a-fib, a-flutter. [id number]  Score: 5. The patients heart rate in the last 24 hours is as follows:  Pulse  Min: 63  Max: 78     Plan  - Replete lytes with a goal of K>4, Mg >2  - Patient is not anticoagulated due to pending PPM  - Patient's afib is currently controlled  - holding AC, AVN blocking meds  - 4/28: Tachy ed syndrome, PPM planned for today  - 4/29: Revision of PPM lead today  Type 2 diabetes mellitus with chronic kidney disease, without long-term current use of insulin  Creatine stable for now. BMP reviewed- noted Estimated Creatinine Clearance: 18.8 mL/min (A) (based on SCr of 2 mg/dL (H)). according to latest data. 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  Monitor UOP and serial BMP and adjust therapy as needed. Renally dose meds. Avoid nephrotoxic medications and procedures.    - SSI with scheduled glucose checks  - A1C 6.9    Critical Care Time: 35 minutes  Critical secondary to Patient has a condition that poses threat to life and bodily function: Symptomatic bradycardia      Critical care was time spent personally by me on the following activities: development of treatment plan with patient or surrogate and bedside caregivers, discussions with consultants, evaluation of patient's response to treatment, examination of patient, ordering and performing treatments and interventions, ordering and review of laboratory studies, ordering and review of radiographic studies, pulse oximetry, re-evaluation of patient's condition. This critical care time did not overlap with that of any other provider or involve time for any procedures.     Pily Rodriguez NP  Critical Care Medicine  [facility] ([facility])

## 2025-04-29 NOTE — EICU
Virtual ICU Quality Rounds    Admit Date: 4/26/2025  Hospital Day: 3    ICU Day: 2d 4h    24H Vital Sign Range:  Temp:  [98.2 °F (36.8 °C)-100 °F (37.8 °C)]   Pulse:  [63-79]   Resp:  [21-37]   BP: ()/(41-77)   SpO2:  [85 %-98 %]     VICU Surveillance Screening    Daily review of  line necessity(optional): Urinary catheter in place            Reyes Indications : Critically ill in the intensive care unit requiring hourly monitoring     LDA reconciliation : Yes

## 2025-04-29 NOTE — BRIEF OP NOTE
[facility] ([facility])  Brief Operative Note  Cardiology    SUMMARY     Surgery Date: 4/29/2025     Surgeons and Role:     * Tran Nance MD - Primary    Assisting Surgeon: None    Pre-op Diagnosis:  Sick sinus syndrome [I49.5]  Pacemaker lead failure, initial encounter [T82.110A]    Post-op Diagnosis: Post-Op Diagnosis Codes:     * Sick sinus syndrome [I49.5]     * Pacemaker lead failure, initial encounter [T82.110A]    Procedure Performed:     Procedure(s) (LRB):  INSERTION, ELECTRODE LEAD, PACEMAKER, 1 ELECTRODE LEAD (N/A)    Technical Procedures Used:     Operative Findings:   Successful atrial lead revision, fell in the rv   Stapled in addition to fascial layer suture   Finish 5 days of antiobiotics       Estimated Blood Loss: * No values recorded between 4/29/2025 12:00 AM and 4/29/2025 10:59 AM *         Specimens:   Specimen (24h ago, onward)      None

## 2025-04-29 NOTE — ASSESSMENT & PLAN NOTE
- rate variable, noted pauses on telemetry  - holding anticoagulation  - labs in process, replace electrolytes as indicated  - TSH pending  - continuous telemetry  - echo ordered  - cardiology consult placed  - NPO pending cards eval    4/28: PPM planned today.  4/29: Back to cath lab for revision of PPM lead, was dislodged from R ventricle. Continue sling post op per Cardiology team.

## 2025-04-30 PROBLEM — M79.672 PAIN IN BOTH FEET: Status: ACTIVE | Noted: 2025-04-30

## 2025-04-30 PROBLEM — M79.671 PAIN IN BOTH FEET: Status: ACTIVE | Noted: 2025-04-30

## 2025-04-30 LAB
ABSOLUTE EOSINOPHIL (OHS): 0.21 K/UL
ABSOLUTE MONOCYTE (OHS): 1.4 K/UL (ref 0.3–1)
ABSOLUTE NEUTROPHIL COUNT (OHS): 8.22 K/UL (ref 1.8–7.7)
ANION GAP (OHS): 12 MMOL/L (ref 8–16)
BASOPHILS # BLD AUTO: 0.04 K/UL
BASOPHILS NFR BLD AUTO: 0.4 %
BUN SERPL-MCNC: 58 MG/DL (ref 10–30)
CALCIUM SERPL-MCNC: 9.2 MG/DL (ref 8.7–10.5)
CHLORIDE SERPL-SCNC: 99 MMOL/L (ref 95–110)
CO2 SERPL-SCNC: 26 MMOL/L (ref 23–29)
CREAT SERPL-MCNC: 1.9 MG/DL (ref 0.5–1.4)
ERYTHROCYTE [DISTWIDTH] IN BLOOD BY AUTOMATED COUNT: 13.1 % (ref 11.5–14.5)
GFR SERPLBLD CREATININE-BSD FMLA CKD-EPI: 25 ML/MIN/1.73/M2
GLUCOSE SERPL-MCNC: 129 MG/DL (ref 70–110)
HCT VFR BLD AUTO: 33.9 % (ref 37–48.5)
HGB BLD-MCNC: 10.8 GM/DL (ref 12–16)
IMM GRANULOCYTES # BLD AUTO: 0.05 K/UL (ref 0–0.04)
IMM GRANULOCYTES NFR BLD AUTO: 0.4 % (ref 0–0.5)
LYMPHOCYTES # BLD AUTO: 1.48 K/UL (ref 1–4.8)
MAGNESIUM SERPL-MCNC: 2.3 MG/DL (ref 1.6–2.6)
MCH RBC QN AUTO: 33.3 PG (ref 27–31)
MCHC RBC AUTO-ENTMCNC: 31.9 G/DL (ref 32–36)
MCV RBC AUTO: 105 FL (ref 82–98)
NUCLEATED RBC (/100WBC) (OHS): 0 /100 WBC
PHOSPHATE SERPL-MCNC: 3.1 MG/DL (ref 2.7–4.5)
PLATELET # BLD AUTO: 183 K/UL (ref 150–450)
PMV BLD AUTO: 10.2 FL (ref 9.2–12.9)
POCT GLUCOSE: 133 MG/DL (ref 70–110)
POCT GLUCOSE: 139 MG/DL (ref 70–110)
POCT GLUCOSE: 147 MG/DL (ref 70–110)
POTASSIUM SERPL-SCNC: 4 MMOL/L (ref 3.5–5.1)
RBC # BLD AUTO: 3.24 M/UL (ref 4–5.4)
RELATIVE EOSINOPHIL (OHS): 1.8 %
RELATIVE LYMPHOCYTE (OHS): 13 % (ref 18–48)
RELATIVE MONOCYTE (OHS): 12.3 % (ref 4–15)
RELATIVE NEUTROPHIL (OHS): 72.1 % (ref 38–73)
SODIUM SERPL-SCNC: 137 MMOL/L (ref 136–145)
WBC # BLD AUTO: 11.4 K/UL (ref 3.9–12.7)

## 2025-04-30 PROCEDURE — 21400001 HC TELEMETRY ROOM: Mod: HCNC

## 2025-04-30 PROCEDURE — 80048 BASIC METABOLIC PNL TOTAL CA: CPT | Mod: HCNC

## 2025-04-30 PROCEDURE — 25000003 PHARM REV CODE 250: Mod: HCNC | Performed by: STUDENT IN AN ORGANIZED HEALTH CARE EDUCATION/TRAINING PROGRAM

## 2025-04-30 PROCEDURE — 94761 N-INVAS EAR/PLS OXIMETRY MLT: CPT | Mod: HCNC

## 2025-04-30 PROCEDURE — 84100 ASSAY OF PHOSPHORUS: CPT | Mod: HCNC

## 2025-04-30 PROCEDURE — 97162 PT EVAL MOD COMPLEX 30 MIN: CPT | Mod: HCNC

## 2025-04-30 PROCEDURE — 25000003 PHARM REV CODE 250: Mod: HCNC | Performed by: INTERNAL MEDICINE

## 2025-04-30 PROCEDURE — 97530 THERAPEUTIC ACTIVITIES: CPT | Mod: HCNC

## 2025-04-30 PROCEDURE — 97166 OT EVAL MOD COMPLEX 45 MIN: CPT | Mod: HCNC

## 2025-04-30 PROCEDURE — 99499 UNLISTED E&M SERVICE: CPT | Mod: HCNC,,, | Performed by: INTERNAL MEDICINE

## 2025-04-30 PROCEDURE — 25000003 PHARM REV CODE 250: Mod: HCNC | Performed by: NURSE PRACTITIONER

## 2025-04-30 PROCEDURE — 85025 COMPLETE CBC W/AUTO DIFF WBC: CPT | Mod: HCNC

## 2025-04-30 PROCEDURE — 83735 ASSAY OF MAGNESIUM: CPT | Mod: HCNC

## 2025-04-30 PROCEDURE — 25000003 PHARM REV CODE 250: Mod: HCNC

## 2025-04-30 PROCEDURE — 36415 COLL VENOUS BLD VENIPUNCTURE: CPT | Mod: HCNC

## 2025-04-30 RX ADMIN — CEPHALEXIN 500 MG: 500 CAPSULE ORAL at 02:04

## 2025-04-30 RX ADMIN — CEPHALEXIN 500 MG: 500 CAPSULE ORAL at 10:04

## 2025-04-30 RX ADMIN — MUPIROCIN: 20 OINTMENT TOPICAL at 10:04

## 2025-04-30 RX ADMIN — HYPROMELLOSE 2910 1 DROP: 5 SOLUTION/ DROPS OPHTHALMIC at 10:04

## 2025-04-30 RX ADMIN — MUPIROCIN: 20 OINTMENT TOPICAL at 08:04

## 2025-04-30 RX ADMIN — CEPHALEXIN 500 MG: 500 CAPSULE ORAL at 05:04

## 2025-04-30 RX ADMIN — LIDOCAINE 5% 1 PATCH: 700 PATCH TOPICAL at 10:04

## 2025-04-30 RX ADMIN — ACETAMINOPHEN 650 MG: 325 TABLET ORAL at 10:04

## 2025-04-30 RX ADMIN — ALPRAZOLAM 0.5 MG: 0.5 TABLET ORAL at 10:04

## 2025-04-30 NOTE — PLAN OF CARE
Pt is an assist x3 when sitting up on side of the bed. Her lower extremities are extremely too weak. She had complaints of her L foot hurting. Administered tylenol for pain and lidocaine patch. Will continue to monitor.    Problem: Adult Inpatient Plan of Care  Goal: Plan of Care Review  Outcome: Progressing     Problem: Adult Inpatient Plan of Care  Goal: Patient-Specific Goal (Individualized)  Outcome: Progressing     Problem: Adult Inpatient Plan of Care  Goal: Absence of Hospital-Acquired Illness or Injury  Outcome: Progressing     Problem: Adult Inpatient Plan of Care  Goal: Optimal Comfort and Wellbeing  Outcome: Progressing

## 2025-04-30 NOTE — PLAN OF CARE
PT EVAL complete. Required MIN A for bed mobility, unable to perform STS. Recommending moderate intensity therapy upon d/c.

## 2025-04-30 NOTE — PLAN OF CARE
SW called to room by nursing staff per family request. Pt in process of working with PT/OT. SW spoke with pts son, Arie, who expressed concern regarding pts current mobility. Pt normally ambulatory at home with a walker.   SW advised that CM will follow up once therapy recs are available. SW discussed possible options of home health vs placement.  SW to follow.

## 2025-04-30 NOTE — ASSESSMENT & PLAN NOTE
Denies falling but may have occurred during ambulance ride  Exam reassuring  Multiple allergies  Follow up outpatient primary care provider   Physical/occupational therapy

## 2025-04-30 NOTE — ASSESSMENT & PLAN NOTE
-admit to inpatient  -monitor on tele  -hold darryl blocking agents, hold Eliquis, check 2D echo  -cardiology consulted recs appreciated  Status post ppm   Resume bystolic on discharge

## 2025-04-30 NOTE — SUBJECTIVE & OBJECTIVE
Review of Systems   Constitutional: Positive for malaise/fatigue.   HENT: Negative.     Eyes: Negative.    Cardiovascular: Negative.    Respiratory: Negative.     Skin: Negative.    Musculoskeletal: Negative.    Gastrointestinal: Negative.    Genitourinary: Negative.    Neurological:  Positive for weakness.   Psychiatric/Behavioral: Negative.       Objective:     Vital Signs (Most Recent):  Temp: 98.3 °F (36.8 °C) (04/30/25 0744)  Pulse: 81 (04/30/25 1120)  Resp: 18 (04/30/25 0744)  BP: (!) 122/59 (04/30/25 0744)  SpO2: (!) 92 % (04/30/25 0933) Vital Signs (24h Range):  Temp:  [98.2 °F (36.8 °C)-98.9 °F (37.2 °C)] 98.3 °F (36.8 °C)  Pulse:  [65-86] 81  Resp:  [17-39] 18  SpO2:  [88 %-100 %] 92 %  BP: ()/(50-64) 122/59     Weight: 82.4 kg (181 lb 10.5 oz)  Body mass index is 31.18 kg/m².     SpO2: (!) 92 %         Intake/Output Summary (Last 24 hours) at 4/30/2025 1127  Last data filed at 4/30/2025 0830  Gross per 24 hour   Intake 240 ml   Output 650 ml   Net -410 ml       Lines/Drains/Airways       Peripheral Intravenous Line  Duration                  Peripheral IV - Single Lumen 04/27/25 1315 20 G Distal;Posterior;Right Forearm 2 days                       Physical Exam  Vitals and nursing note reviewed.   Constitutional:       Appearance: Normal appearance.   HENT:      Head: Normocephalic.   Eyes:      Pupils: Pupils are equal, round, and reactive to light.   Cardiovascular:      Rate and Rhythm: Normal rate and regular rhythm.      Heart sounds: Normal heart sounds, S1 normal and S2 normal. No murmur heard.     No S3 or S4 sounds.   Pulmonary:      Effort: Pulmonary effort is normal.      Breath sounds: Normal breath sounds.   Abdominal:      General: Bowel sounds are normal.      Palpations: Abdomen is soft.   Musculoskeletal:         General: Normal range of motion.      Cervical back: Normal range of motion.   Skin:     Capillary Refill: Capillary refill takes less than 2 seconds.   Neurological:  "     General: No focal deficit present.      Mental Status: She is alert and oriented to person, place, and time.      Motor: Weakness present.   Psychiatric:         Mood and Affect: Mood is anxious.         Behavior: Behavior normal.         Thought Content: Thought content normal.            Significant Labs: BMP:   Recent Labs   Lab 04/29/25  0513 04/30/25  0629   * 129*    137   K 4.4 4.0   CL 99 99   CO2 26 26   BUN 52* 58*   CREATININE 2.0* 1.9*   CALCIUM 9.6 9.2   MG 2.3 2.3   , CMP   Recent Labs   Lab 04/29/25  0513 04/30/25  0629    137   K 4.4 4.0   CL 99 99   CO2 26 26   * 129*   BUN 52* 58*   CREATININE 2.0* 1.9*   CALCIUM 9.6 9.2   ANIONGAP 13 12   , CBC   Recent Labs   Lab 04/29/25  0513 04/30/25  0629   WBC 10.69 11.40   HGB 11.7* 10.8*   HCT 34.8* 33.9*    183   , INR No results for input(s): "INR", "PROTIME" in the last 48 hours., Lipid Panel No results for input(s): "CHOL", "HDL", "LDLCALC", "TRIG", "CHOLHDL" in the last 48 hours., Troponin No results for input(s): "TROPONINIHS" in the last 48 hours., and All pertinent lab results from the last 24 hours have been reviewed.    Significant Imaging: Cardiac Cath: reviewed, Echocardiogram: Transthoracic echo (TTE) complete (Cupid Only):   Results for orders placed or performed during the hospital encounter of 04/26/25   Echo   Result Value Ref Range    BSA 1.94 m2    LVOT stroke volume 51.2 cm3    LVIDd 4.1 3.5 - 6.0 cm    LV Systolic Volume 24 mL    LV Systolic Volume Index 12.7 mL/m2    LVIDs 2.6 2.1 - 4.0 cm    LV ESV A2C 116.35 mL    LV Diastolic Volume 73 mL    LV ESV A4C 75.27 mL    LV Diastolic Volume Index 38.62 mL/m2    Left Ventricular End Systolic Volume by Teichholz Method 23.95 mL    Left Ventricular End Diastolic Volume by Teichholz Method 72.78 mL    IVS 0.9 0.6 - 1.1 cm    LVOT diameter 2.1 cm    LVOT area 3.5 cm2    FS 36.6 28 - 44 %    Left Ventricle Relative Wall Thickness 0.49 cm    PW 1.0 0.6 - 1.1 " cm    LV mass 123.0 g    LV Mass Index 65.1 g/m2    MV Peak E Jus 1.37 m/s    TDI LATERAL 0.11 m/s    TDI SEPTAL 0.09 m/s    E/E' ratio 14 m/s    MV Peak A Jus 0.36 m/s    TR Max Jus 3.4 m/s    E/A ratio 3.81     IVRT 63 msec    E wave deceleration time 194 msec    LV SEPTAL E/E' RATIO 15.2 m/s    LV LATERAL E/E' RATIO 12.5 m/s    LVOT peak jus 0.8 m/s    Left Ventricular Outflow Tract Mean Velocity 0.56 cm/s    Left Ventricular Outflow Tract Mean Gradient 1.40 mmHg    RV- reyes basal diam 3.2 cm    TAPSE 1.3 cm    RV/LV Ratio 0.78 cm    LA size 3.8 cm    Left Atrium Major Axis 7.0 cm    LA Vol (MOD) 97 mL    DEJAN (MOD) 51 mL/m2    RA Major Axis 6.43 cm    AV mean gradient 4 mmHg    AV peak gradient 7 mmHg    Ao peak jus 1.3 m/s    Ao VTI 27.9 cm    LVOT peak VTI 14.8 cm    AV valve area 1.8 cm²    AV Velocity Ratio 0.62     AV index (prosthetic) 0.53     JESSICA by Velocity Ratio 2.1 cm²    MV stenosis pressure 1/2 time 56.40 ms    MV valve area p 1/2 method 3.90 cm2    Triscuspid Valve Regurgitation Peak Gradient 47 mmHg    STJ 2.6 cm    Ascending aorta 3.2 cm    Mean e' 0.10 m/s    ZLVIDS -1.79     ZLVIDD -2.55     LA area A4C 25.66 cm2    LA area A2C 32.79 cm2    RVDD 3.20 cm    DEJAN 59 mL/m2    LA Vol 111 cm3    Left Atrium Minor Axis 7.0 cm    LA WIDTH 4.9 cm    RA Width 4.2 cm    TV resting pulmonary artery pressure 54 mmHg    RV TB RVSP 11 mmHg    Est. RA pres 8 mmHg    Narrative      Left Ventricle: The left ventricle is normal in size. Normal wall   thickness. There is normal systolic function with a visually estimated   ejection fraction of 60 - 65%. Grade III diastolic dysfunction.    Right Ventricle: The right ventricle is normal in size. Wall thickness   is normal. Systolic function is normal.    Left Atrium: Severely dilated    Mitral Valve: There is mild regurgitation.    Tricuspid Valve: There is mild to moderate regurgitation.    Pulmonary Artery: The estimated pulmonary artery systolic pressure is   54  mmHg.    IVC/SVC: Intermediate venous pressure at 8 mmHg.     , EKG: reviewed, Stress Test: reviewed, and X-Ray: CXR: X-Ray Chest 1 View (CXR): No results found for this visit on 04/26/25.

## 2025-04-30 NOTE — PROGRESS NOTES
[facility] - Telemetry ([facility])  Cardiology  Progress Note    Patient Name: Shirley Metz  MRN: [id number]  Admission Date: 4/26/2025  Hospital Length of Stay: 4 days  Code Status: Full Code   Attending Physician: Savannah Nye MD   Primary Care Physician: Phylicia Deleon MD  Expected Discharge Date:   Principal Problem:Symptomatic bradycardia    Subjective:     Hospital Course:   04/27/2025.    Multiple pauses.  Some up to 5 seconds.  Moved overnight from tele to the ICU for closer monitoring  Eliquis on hold  Tachy-ed syndrome.    In the ICU.  Has pacer pads on.    Discussed in length risks and benefits of pacemaker.      4.28.2025  Still having multiple pauses.    Longest so far about 6 seconds and symptomatic    4/30/25 pt seen and examined today denies any CP at this time, has multiple complaints about weakness, unable to walk. Does not feel strong enough to be DC. Labs reviewed, chart reviewed, PPM site C/D/I sling in place. Can resume her bystolic as OP        Review of Systems   Constitutional: Positive for malaise/fatigue.   HENT: Negative.     Eyes: Negative.    Cardiovascular: Negative.    Respiratory: Negative.     Skin: Negative.    Musculoskeletal: Negative.    Gastrointestinal: Negative.    Genitourinary: Negative.    Neurological:  Positive for weakness.   Psychiatric/Behavioral: Negative.       Objective:     Vital Signs (Most Recent):  Temp: 98.3 °F (36.8 °C) (04/30/25 0744)  Pulse: 81 (04/30/25 1120)  Resp: 18 (04/30/25 0744)  BP: (!) 122/59 (04/30/25 0744)  SpO2: (!) 92 % (04/30/25 0933) Vital Signs (24h Range):  Temp:  [98.2 °F (36.8 °C)-98.9 °F (37.2 °C)] 98.3 °F (36.8 °C)  Pulse:  [65-86] 81  Resp:  [17-39] 18  SpO2:  [88 %-100 %] 92 %  BP: ()/(50-64) 122/59     Weight: 82.4 kg (181 lb 10.5 oz)  Body mass index is 31.18 kg/m².     SpO2: (!) 92 %         Intake/Output Summary (Last 24 hours) at 4/30/2025 1127  Last data filed at 4/30/2025 0830  Gross per 24 hour   Intake 240  "ml   Output 650 ml   Net -410 ml       Lines/Drains/Airways       Peripheral Intravenous Line  Duration                  Peripheral IV - Single Lumen 04/27/25 1315 20 G Distal;Posterior;Right Forearm 2 days                       Physical Exam  Vitals and nursing note reviewed.   Constitutional:       Appearance: Normal appearance.   HENT:      Head: Normocephalic.   Eyes:      Pupils: Pupils are equal, round, and reactive to light.   Cardiovascular:      Rate and Rhythm: Normal rate and regular rhythm.      Heart sounds: Normal heart sounds, S1 normal and S2 normal. No murmur heard.     No S3 or S4 sounds.   Pulmonary:      Effort: Pulmonary effort is normal.      Breath sounds: Normal breath sounds.   Abdominal:      General: Bowel sounds are normal.      Palpations: Abdomen is soft.   Musculoskeletal:         General: Normal range of motion.      Cervical back: Normal range of motion.   Skin:     Capillary Refill: Capillary refill takes less than 2 seconds.   Neurological:      General: No focal deficit present.      Mental Status: She is alert and oriented to person, place, and time.      Motor: Weakness present.   Psychiatric:         Mood and Affect: Mood is anxious.         Behavior: Behavior normal.         Thought Content: Thought content normal.            Significant Labs: BMP:   Recent Labs   Lab 04/29/25  0513 04/30/25  0629   * 129*    137   K 4.4 4.0   CL 99 99   CO2 26 26   BUN 52* 58*   CREATININE 2.0* 1.9*   CALCIUM 9.6 9.2   MG 2.3 2.3   , CMP   Recent Labs   Lab 04/29/25  0513 04/30/25  0629    137   K 4.4 4.0   CL 99 99   CO2 26 26   * 129*   BUN 52* 58*   CREATININE 2.0* 1.9*   CALCIUM 9.6 9.2   ANIONGAP 13 12   , CBC   Recent Labs   Lab 04/29/25  0513 04/30/25  0629   WBC 10.69 11.40   HGB 11.7* 10.8*   HCT 34.8* 33.9*    183   , INR No results for input(s): "INR", "PROTIME" in the last 48 hours., Lipid Panel No results for input(s): "CHOL", "HDL", "LDLCALC", " ""TRIG", "CHOLHDL" in the last 48 hours., Troponin No results for input(s): "TROPONINIHS" in the last 48 hours., and All pertinent lab results from the last 24 hours have been reviewed.    Significant Imaging: Cardiac Cath: reviewed, Echocardiogram: Transthoracic echo (TTE) complete (Cupid Only):   Results for orders placed or performed during the hospital encounter of 04/26/25   Echo   Result Value Ref Range    BSA 1.94 m2    LVOT stroke volume 51.2 cm3    LVIDd 4.1 3.5 - 6.0 cm    LV Systolic Volume 24 mL    LV Systolic Volume Index 12.7 mL/m2    LVIDs 2.6 2.1 - 4.0 cm    LV ESV A2C 116.35 mL    LV Diastolic Volume 73 mL    LV ESV A4C 75.27 mL    LV Diastolic Volume Index 38.62 mL/m2    Left Ventricular End Systolic Volume by Teichholz Method 23.95 mL    Left Ventricular End Diastolic Volume by Teichholz Method 72.78 mL    IVS 0.9 0.6 - 1.1 cm    LVOT diameter 2.1 cm    LVOT area 3.5 cm2    FS 36.6 28 - 44 %    Left Ventricle Relative Wall Thickness 0.49 cm    PW 1.0 0.6 - 1.1 cm    LV mass 123.0 g    LV Mass Index 65.1 g/m2    MV Peak E Jus 1.37 m/s    TDI LATERAL 0.11 m/s    TDI SEPTAL 0.09 m/s    E/E' ratio 14 m/s    MV Peak A Jus 0.36 m/s    TR Max Jus 3.4 m/s    E/A ratio 3.81     IVRT 63 msec    E wave deceleration time 194 msec    LV SEPTAL E/E' RATIO 15.2 m/s    LV LATERAL E/E' RATIO 12.5 m/s    LVOT peak jus 0.8 m/s    Left Ventricular Outflow Tract Mean Velocity 0.56 cm/s    Left Ventricular Outflow Tract Mean Gradient 1.40 mmHg    RV- reyes basal diam 3.2 cm    TAPSE 1.3 cm    RV/LV Ratio 0.78 cm    LA size 3.8 cm    Left Atrium Major Axis 7.0 cm    LA Vol (MOD) 97 mL    DEJAN (MOD) 51 mL/m2    RA Major Axis 6.43 cm    AV mean gradient 4 mmHg    AV peak gradient 7 mmHg    Ao peak jus 1.3 m/s    Ao VTI 27.9 cm    LVOT peak VTI 14.8 cm    AV valve area 1.8 cm²    AV Velocity Ratio 0.62     AV index (prosthetic) 0.53     JESSICA by Velocity Ratio 2.1 cm²    MV stenosis pressure 1/2 time 56.40 ms    MV valve area " "p 1/2 method 3.90 cm2    Triscuspid Valve Regurgitation Peak Gradient 47 mmHg    STJ 2.6 cm    Ascending aorta 3.2 cm    Mean e' 0.10 m/s    ZLVIDS -1.79     ZLVIDD -2.55     LA area A4C 25.66 cm2    LA area A2C 32.79 cm2    RVDD 3.20 cm    DEJAN 59 mL/m2    LA Vol 111 cm3    Left Atrium Minor Axis 7.0 cm    LA WIDTH 4.9 cm    RA Width 4.2 cm    TV resting pulmonary artery pressure 54 mmHg    RV TB RVSP 11 mmHg    Est. RA pres 8 mmHg    Narrative      Left Ventricle: The left ventricle is normal in size. Normal wall   thickness. There is normal systolic function with a visually estimated   ejection fraction of 60 - 65%. Grade III diastolic dysfunction.    Right Ventricle: The right ventricle is normal in size. Wall thickness   is normal. Systolic function is normal.    Left Atrium: Severely dilated    Mitral Valve: There is mild regurgitation.    Tricuspid Valve: There is mild to moderate regurgitation.    Pulmonary Artery: The estimated pulmonary artery systolic pressure is   54 mmHg.    IVC/SVC: Intermediate venous pressure at 8 mmHg.     , EKG: reviewed, Stress Test: reviewed, and X-Ray: CXR: X-Ray Chest 1 View (CXR): No results found for this visit on 04/26/25.  Assessment and Plan:       * Symptomatic bradycardia  See tachy-ed syndrome    Chronic diastolic congestive heart failure  Patient has Diastolic (HFpEF) heart failure that is Acute on chronic. On presentation their CHF was decompensated. Evidence of decompensated CHF on presentation includes: orthopnea. The etiology of their decompensation is likely dietary indiscretion. Most recent BNP and echo results are listed below.  No results for input(s): "BNP" in the last 72 hours.    Latest ECHO  Results for orders placed during the hospital encounter of 04/26/25    Echo    Interpretation Summary    Left Ventricle: The left ventricle is normal in size. Normal wall thickness. There is normal systolic function with a visually estimated ejection fraction of 60 - " 65%. Grade III diastolic dysfunction.    Right Ventricle: The right ventricle is normal in size. Wall thickness is normal. Systolic function is normal.    Left Atrium: Severely dilated    Mitral Valve: There is mild regurgitation.    Tricuspid Valve: There is mild to moderate regurgitation.    Pulmonary Artery: The estimated pulmonary artery systolic pressure is 54 mmHg.    IVC/SVC: Intermediate venous pressure at 8 mmHg.    Current Heart Failure Medications       Plan  - Monitor strict I&Os and daily weights.    - Place on telemetry  - Low sodium diet  - Place on fluid restriction of 1.5 L.   - Cardiology has been consulted  - The patient's volume status is at their baseline  - give 1 dose IV Lasix.    DC p.o. Lasix.            Sick sinus syndrome  See tachy-ed syndrome    4/30/25  S/p PPM    Tachy-ed syndrome  Hold Eliquis.    No Lovenox or heparin for DVT prophylaxis.    NPO after midnight.    Last dose of Eliquis was Friday evening.    Plan for dual-chamber pacemaker in a.m..    Continue to hold Bystolic.        04/28/2025.    Dual-chamber pacemaker today.    Still have multiple pauses.      4/30/25  S/p PPM  Can resume bystolic upon DC  F/u in arrhythmia clinic  Sling for 24/7 for first 2 days then at night for 4 weeks      Persistent atrial fibrillation  Can resume home bystolic as OP  Can resume her eliquis tomorrow night, 5/1 pm    Obesity (BMI 30.0-34.9)  Weight loss    HTN (hypertension)  Titrate medications, can resume home bystolic        VTE Risk Mitigation (From admission, onward)           Ordered     IP VTE HIGH RISK PATIENT  Once         04/26/25 1621     Place sequential compression device  Until discontinued         04/26/25 1621                    Juany Amin, NP  Cardiology  [facility] - Telemetry ([facility])

## 2025-04-30 NOTE — SUBJECTIVE & OBJECTIVE
Interval History: See hospital course for today      Review of Systems   Constitutional:  Positive for activity change and fatigue.   Respiratory:  Negative for shortness of breath.    Gastrointestinal:  Negative for nausea and vomiting.   Musculoskeletal:  Positive for arthralgias, gait problem and myalgias.   Skin:  Positive for wound.   Neurological:  Positive for weakness.   Psychiatric/Behavioral:  The patient is nervous/anxious.      Objective:     Vital Signs (Most Recent):  Temp: 98.2 °F (36.8 °C) (04/30/25 1139)  Pulse: 79 (04/30/25 1139)  Resp: 18 (04/30/25 1139)  BP: (!) 104/54 (04/30/25 1139)  SpO2: (!) 94 % (04/30/25 1139) Vital Signs (24h Range):  Temp:  [98.2 °F (36.8 °C)-98.9 °F (37.2 °C)] 98.2 °F (36.8 °C)  Pulse:  [65-86] 79  Resp:  [17-31] 18  SpO2:  [88 %-100 %] 94 %  BP: ()/(50-64) 104/54     Weight: 82.4 kg (181 lb 10.5 oz)  Body mass index is 31.18 kg/m².    Intake/Output Summary (Last 24 hours) at 4/30/2025 1412  Last data filed at 4/30/2025 0830  Gross per 24 hour   Intake 240 ml   Output 200 ml   Net 40 ml         Physical Exam  Vitals and nursing note reviewed. Exam conducted with a chaperone present (charge).   Constitutional:       General: She is not in acute distress.     Appearance: She is ill-appearing. She is not toxic-appearing.   HENT:      Head: Normocephalic and atraumatic.   Cardiovascular:      Rate and Rhythm: Normal rate.   Pulmonary:      Effort: Pulmonary effort is normal. No respiratory distress.   Chest:       Abdominal:      Palpations: Abdomen is soft.      Tenderness: There is no abdominal tenderness.   Musculoskeletal:         General: Tenderness present.      Right knee: Normal. Normal range of motion.      Left knee: Normal. Normal range of motion.      Right lower leg: No edema.      Left lower leg: No edema.      Right ankle: Decreased range of motion.      Left ankle: Decreased range of motion.      Right foot: Tenderness present.      Left foot:  Tenderness present.      Comments: Left arm in shoulder sling   Skin:     General: Skin is warm.      Capillary Refill: Capillary refill takes less than 2 seconds.      Coloration: Skin is pale.   Neurological:      Mental Status: She is alert and oriented to person, place, and time.      Motor: Weakness present.   Psychiatric:         Mood and Affect: Mood is anxious.               Significant Labs: All pertinent labs within the past 24 hours have been reviewed.  A1C:   Recent Labs   Lab 04/28/25  0300   HGBA1C 7.7*     CBC:   Recent Labs   Lab 04/29/25  0513 04/30/25  0629   WBC 10.69 11.40   HGB 11.7* 10.8*   HCT 34.8* 33.9*    183     CMP:   Recent Labs   Lab 04/29/25  0513 04/30/25  0629    137   K 4.4 4.0   CL 99 99   CO2 26 26   * 129*   BUN 52* 58*   CREATININE 2.0* 1.9*   CALCIUM 9.6 9.2   ANIONGAP 13 12     POCT Glucose:   Recent Labs   Lab 04/29/25  2305 04/30/25  0538 04/30/25  1149   POCTGLUCOSE 194* 139* 133*       Significant Imaging: I have reviewed all pertinent imaging results/findings within the past 24 hours.

## 2025-04-30 NOTE — ASSESSMENT & PLAN NOTE
"Patient has Diastolic (HFpEF) heart failure that is Chronic. On presentation their CHF was well compensated. Most recent BNP and echo results are listed below.  No results for input(s): "BNP" in the last 72 hours.    Latest ECHO  Results for orders placed during the hospital encounter of 07/11/22    Echo    Interpretation Summary  · Concentric remodeling and normal systolic function.  · The estimated ejection fraction is 60%.  · Normal left ventricular diastolic function.  · Normal right ventricular size with normal right ventricular systolic function.    Current Heart Failure Medications       Plan  - Monitor strict I&Os and daily weights.    - Place on telemetry  - Low sodium diet  - Place on fluid restriction of 1.5 L.   - Cardiology has been consulted  - The patient's volume status is at their baseline  Resume bystolic on discharge       "

## 2025-04-30 NOTE — PT/OT/SLP EVAL
Occupational Therapy Evaluation and Treatment    Name: Shirley Metz  MRN: [id number]  Admitting Diagnosis: Symptomatic bradycardia  Recent Surgery: Procedure(s) (LRB):  INSERTION, ELECTRODE LEAD, PACEMAKER, 1 ELECTRODE LEAD (N/A) 1 Day Post-Op    Recommendations:     Discharge Recommendations: Moderate Intensity Therapy  Level of Assistance Recommended: 24 hours significant assistance  Discharge Equipment Recommendations: none  Barriers to discharge: None    Assessment:     Shirley Metz is a 89 y.o. female with a medical diagnosis of Symptomatic bradycardia. She presents with performance deficits affecting function including weakness, impaired self care skills, impaired balance, decreased safety awareness, impaired endurance, impaired functional mobility, decreased upper extremity function, gait instability.     Rehab Prognosis: Good; patient would benefit from acute OT services to address these deficits and reach maximum level of function.    Plan:     Patient to be seen 2 x/week to address the above listed problems via self-care/home management, therapeutic exercises, therapeutic activities  Plan of Care Expires:    Plan of Care Reviewed with: patient    Subjective     Chief Complaint: DEBILITY AND GENERALIZED WEAKNESS  Patient Comments/Goals: GET STRONGER  Pain/Comfort:  Pain Rating 1: 7/10  Location - Orientation 1: lower  Location 1: foot    Patients cultural, spiritual, Christianity conflicts given the current situation:      Social History:  Living Environment: Patient lives alone in  [facility]  with elevator  Prior Level of Function: Prior to admission, patient was modified independent  Roles and Routines: Patient was driving and not working prior to admission.  Equipment Used at Home: rollator, bath bench, other (see comments), grab bar (ELEVATED SLING)  DME owned (not currently used): none  Assistance Upon Discharge: facility staff    Objective:     Communicated with  NURSE NGUYEN AND [facility] CHART REVIEW prior to session. Patient found HOB elevated with   upon OT entry to room.    General Precautions: Standard, fall   Orthopedic Precautions: LUE non weight bearing   Braces: Sling and swathe (L UE SWATHE SLING)    Respiratory Status: Room air    Occupational Performance    Bed Mobility:   Rolling/Turning to Left with minimum assistance  Rolling/Turning to Right with minimum assistance  Scooting anteriorly to EOB to have both feet planted on floor: moderate assistance  Supine to sit from right side of bed with minimum assistance  Supine to sit from left side of bed with minimum assistance    Functional Mobility/Transfers:  Sit <> Stand Transfer with ATTEMPTED with hand-held assist      Activities of Daily Living:  Upper Body Dressing: maximal assistance  Lower Body Dressing: maximal assistance    Cognitive/Visual Perceptual:  Cognitive/Psychosocial Skills:    -     Oriented to: Person, Place, Time, Situation  -     Follows Commands/attention: Follows two-step commands  -     Communication: clear/fluent  -     Memory: No Deficits noted  -     Safety awareness/insight to disability: impaired    Physical Exam:  Upper Extremity Range of Motion:     -       Right Upper Extremity: WFL  -       Left Upper Extremity: NOT TESTED  Upper Extremity Strength:    -       Right Upper Extremity: MMT: 3/5 GROSSLY  -       Left Upper Extremity: NOT TESTED   Strength:    -       Right Upper Extremity: MMT: 3/5 GROSSLY  -       Left Upper Extremity: NOT TESTED    AMPAC 6 Click ADL:  AMPAC Total Score: 14    Treatment & Education:  Therapist provided facilitation and instruction of proper body mechanics, energy conservation, and fall prevention strategies during tasks listed above  Patient educated on role of OT, POC, and goals for therapy  Patient educated on importance of OOB activities with staff member assistance and sitting OOB majority of the day      Patient not clear to transfer with  RN/PCT.    Patient left HOB elevated with all lines intact, call button in reach, RN notified, and spouse and SON present.    GOALS:   Multidisciplinary Problems       Occupational Therapy Goals          Problem: Occupational Therapy    Goal Priority Disciplines Outcome Interventions   Occupational Therapy Goal     OT, PT/OT     Description: O.T. GOALS TO BE MET 5-14-20  PT WILL REQ MOD A WITH BSC TRANSFER  MOD A WITH LE DRESSING  MOD A WITH UE DRESSING  MOD A WITH TOILETING                     History:     Past Medical History:   Diagnosis Date    Acute on chronic diastolic congestive heart failure 01/11/2023    Anemia 11/29/2018    Anxiety 11/28/2017    Arthritis     Atrial fibrillation with RVR 10/09/2019    Back pain     Basal cell carcinoma 03/29/2018    left mid back    Chronic diastolic congestive heart failure 10/15/2019    CKD (chronic kidney disease) stage 3, GFR 30-59 ml/min 08/08/2016    Colon polyps     reported per patient.     Coronary artery calcification 10/05/2021    Elevated liver enzymes 12/20/2019    Elevated troponin 03/15/2018    Fuchs' corneal dystrophy     General anesthetics causing adverse effect in therapeutic use     woke up during surgery and gagged on ET tube    Glaucoma     Glaucoma     Heart failure with preserved left ventricular function (HFpEF) 07/24/2018    Hyperlipidemia     Hypertension     Macular degeneration dry    Medication side effects 05/03/2017    Obesity     Pre-diabetes 12/04/2017    PVD (peripheral vascular disease) 04/26/2021    PVD (peripheral vascular disease) 04/26/2021    Squamous cell carcinoma 01/08/2019    left shoulder    Stenosis of carotid artery 10/05/2021    Troponin level elevated 01/11/2023    Trouble in sleeping     Type 2 diabetes mellitus without complication, without long-term current use of insulin 02/24/2021    Urinary tract infection          Past Surgical History:   Procedure Laterality Date    A-V CARDIAC PACEMAKER INSERTION N/A 4/28/2025     Procedure: INSERTION, CARDIAC PACEMAKER, DUAL CHAMBER;  Surgeon: Tran Nance MD;  Location: [facility] CATH LAB;  Service: Cardiology;  Laterality: N/A;    ABLATION OF ARRHYTHMOGENIC FOCUS FOR ATRIAL FIBRILLATION N/A 11/03/2022    Procedure: Ablation atrial fibrillation;  Surgeon: Toi Kelly MD;  Location: [facility] EP LAB;  Service: Cardiology;  Laterality: N/A;  afib, PVI/CTI, RFA, STACEY (cx if SR), DEDE, anes, MB, 3 Prep    ADENOIDECTOMY  1938    BREAST BIOPSY Left     1997. benign    BREAST CYST EXCISION Left 1997 approx    CARPAL TUNNEL RELEASE Right 2012 approx    CATARACT EXTRACTION Bilateral 1994    CHOLECYSTECTOMY  1975    CORNEAL TRANSPLANT Bilateral 08/2015 8/2015 - left; Right 4/2016    EYE SURGERY  12/06/2023    Fuch's Corneal dystrophy - had 2nd operation with Dr. Quintanilla    EYE SURGERY      Cataract wIOL    INSERTION, ELECTRODE LEAD, CARDIAC PACEMAKER, 1 ELECTRODE LEAD N/A 4/29/2025    Procedure: INSERTION, ELECTRODE LEAD, PACEMAKER, 1 ELECTRODE LEAD;  Surgeon: Tran Nance MD;  Location: [facility] CATH LAB;  Service: Cardiology;  Laterality: N/A;  atrial lead revision/ replacement--Biotronik    left thumb Left 2011    removed cuboid for arthritis    REVERSE TOTAL SHOULDER ARTHROPLASTY Left 08/21/2018    Procedure: ARTHROPLASTY, SHOULDER, TOTAL, REVERSE;  Surgeon: Solomon Lujan MD;  Location: [facility];  Service: Orthopedics;  Laterality: Left;  WITH BICEP TENODESIS    SKIN CANCER EXCISION Left 2017    BCC removal by Dr. Valadez    SLT- OS (aka TRABECULOPLASTY) Left 05/11/2011    repeat 3/14/12    TENOTOMY Left 08/21/2018    Procedure: TENOTOMY;  Surgeon: Solomon Lujan MD;  Location: [facility] OR;  Service: Orthopedics;  Laterality: Left;    TONSILLECTOMY  1938    TRANSESOPHAGEAL ECHOCARDIOGRAM WITH POSSIBLE CARDIOVERSION (STACEY W/ POSS CARDIOVERSION) N/A 03/21/2023    Procedure: Transesophageal echo (STACEY) intra-procedure log documentation;  Surgeon: Trevon Ramirez MD;  Location: [facility] CATH LAB;   Service: Cardiology;  Laterality: N/A;    TREATMENT OF CARDIAC ARRHYTHMIA N/A 10/10/2019    Procedure: CARDIOVERSION;  Surgeon: Pete Durán MD;  Location: [facility] CATH LAB;  Service: Cardiology;  Laterality: N/A;    TREATMENT OF CARDIAC ARRHYTHMIA N/A 12/06/2019    Procedure: CARDIOVERSION;  Surgeon: Samy Castillo MD;  Location: [facility] CATH LAB;  Service: Cardiology;  Laterality: N/A;       Time Tracking:     OT Date of Treatment: 04/30/25  OT Start Time: 1545  OT Stop Time: 1615  OT Total Time (min): 30 min    Billable Minutes: Evaluation 15 MINUTES and Therapeutic Activity 15 MINUTES    4/30/2025

## 2025-04-30 NOTE — ASSESSMENT & PLAN NOTE
Patient's blood pressure range in the last 24 hours was: BP  Min: 99/50  Max: 135/60.The patient's inpatient anti-hypertensive regimen is listed below:  Current Antihypertensives       Plan  - resume lasix on discharge

## 2025-04-30 NOTE — HOSPITAL COURSE
4/30 admitted to icu for symptomatic bradycardia. Cardiology consulted and permanent pacemaker placed. Transferred out of icu. Ok of discharge per cardiology but patient now complaining of bilateral feet pain. Denies trauma but thinks may have occurred during ambulance ride to hospital.  5/1 continues to complain of bilateral feet pain, L>>R, impeding mobility. Characterizes as sharp. Does endorse improvement in right foot. Encourage OOB activity. Resume eliquis and keflex per cardiology.   Uric acid was noted to be markedly elevated, she was started on colchicine with improvement in her lower extremity pain.  Patient was accepted at skilled nursing insurance authorization was obtained and patient is stable to discharge.  Patient seen and examined on day of discharge stable for discharge to skilled nursing.

## 2025-04-30 NOTE — NURSING TRANSFER
Nursing Transfer Note      4/29/2025   5:15 PM    Nurse giving handoff: ALPHONSE Rondon    Nurse receiving handoff: ALPHONSE Valderrama    Reason patient is being transferred: Downgraded to Med/Tele status    Transfer To: [facility] 2, room 252    Transfer via bed    Transfer with cardiac monitoring    Transported by ALPHONSE Rondon    Telemetry: Box Number [id number]  Order for Tele Monitor? Yes    Additional Lines: Reyes Catheter    Medicines sent: Santi Tears, Mupirocin, latanoprostene    Any special needs or follow-up needed: N/A    Patient belongings transferred with patient: Yes    Chart send with patient: Yes    Notified: son at bedside

## 2025-04-30 NOTE — ASSESSMENT & PLAN NOTE
Patient has persistent (7 days or more) atrial fibrillation. Patient is currently in sinus rhythm. [id number] Score: 5. The patients heart rate in the last 24 hours is as follows:  Pulse  Min: 65  Max: 86     Antiarrhythmics       Anticoagulants       Plan  - holding Eliquis for possible pacemaker placement  -holding all darryl blocking agents due to bradycardia  Resume eliquis on evening of 5/1

## 2025-04-30 NOTE — PT/OT/SLP EVAL
Physical Therapy Evaluation and Treatment    Patient Name: Shirley Metz   MRN: [id number]  Recent Surgery: Procedure(s) (LRB):  INSERTION, ELECTRODE LEAD, PACEMAKER, 1 ELECTRODE LEAD (N/A) 1 Day Post-Op    Recommendations:     Discharge Recommendations: Moderate Intensity Therapy   Discharge Equipment Recommendations: to be determined by next level of care   Barriers to discharge: Decreased caregiver support    Assessment:     Shirley Metz is a 89 y.o. female admitted with a medical diagnosis of Symptomatic bradycardia. She presents with the following impairments/functional limitations: weakness, impaired endurance, impaired functional mobility, gait instability, impaired balance, pain, decreased safety awareness, decreased lower extremity function, decreased ROM, impaired cardiopulmonary response to activity.    Patient currently demonstrates a need for moderate intensity therapy on a daily basis secondary to an acute decline from prior level of function.    Rehab Prognosis: Good; patient would benefit from acute PT services to address these deficits and reach maximum level of function.    Plan:     During this hospitalization, patient to be seen 3 x/week to address the above listed problems via gait training, therapeutic activities, therapeutic exercises    Plan of Care Expires: 05/14/25    Subjective     Chief Complaint: Pt is motivated to participate  Patient Comments/Goals: none stated  Pain/Comfort:  Pain Rating 1: 7/10  Location - Side 1: Left  Location - Orientation 1: generalized  Location 1: foot  Pain Addressed 1: Reposition, Distraction  Pain Rating Post-Intervention 1: 7/10    Social History:  Living Environment: Patient lives alone in  a alf community , 3 story, elevator access  Prior Level of Function: Prior to admission, patient was modified independent, driving and retired, and ambulated household and community distances using rollator  Equipment Used at Home: grab bar,  "rollator, shower chair  DME owned (not currently used): none  Assistance Upon Discharge: family    Objective:     Communicated with nurse Bethanie and epic chart review prior to session. Patient found HOB elevated with peripheral IV, telemetry, bed alarm (JESSICA SYS) upon PT entry to room.    General Precautions: Standard, fall, pacemaker   Orthopedic Precautions: LUE non weight bearing   Braces: Sling and swathe    Respiratory Status: Room air    Exams:  Cognition: Patient is oriented to Person, Place, Time, Situation  RLE ROM: WFL  RLE Strength:  Grossly 3+/5  LLE ROM: WFL  LLE Strength:  Grossly 3+/5  Sensation:    -       Intact  Skin Integrity/Edema:     -       Skin integrity: Visible skin intact    Functional Mobility:  Bed Mobility  Rolling Left: moderate assistance  Rolling Right: moderate assistance  Scooting: minimum assistance  Supine to Sit: minimum assistance for LE management and trunk management  Sit to Supine: minimum assistance for LE management and trunk management  Transfers  Sit to Stand: maximal assistance and of 2 persons with hand-held assist, attempted but pt unable to clear bottom from bed, reported "I can't stand." Cuing needed for foot placement.   Balance  Sitting: stand by assistance  Standing: NT    Therapeutic Activities and Exercises:   Pt educated on role of PT in acute care and POC. Reviewed pacemaker precautions. Educated on importance of OOB activities, activity pacing, and HEP (marching/hip flex, hip abd, heel slides/LAQ, quad sets, ankle pumps) in order to maintain/regain strength. Encouraged to sit up for all meals. Educated on "call don't fall" policy and increased risk of falling due to weakness, instructed to utilize call bell for assistance with all transfers. Pt agreeable to all requests.    AM-PAC 6 CLICK MOBILITY  Total Score:10    Patient left with bed in chair position with all lines intact, call button in reach, bed alarm on, and family present.    GOALS: "   Multidisciplinary Problems       Physical Therapy Goals          Problem: Physical Therapy    Goal Priority Disciplines Outcome Interventions   Physical Therapy Goal     PT, PT/OT     Description: Goals to be met by 5/14/25.  1. Pt will complete bed mobility SBA.  2. Pt will complete sit to stand SBA.  3. Pt will ambulate 100ft CGA using LRAD.  4. Pt will increase AMPAC score by 2 points to progress functional mobility.                         History:     Past Medical History:   Diagnosis Date    Acute on chronic diastolic congestive heart failure 01/11/2023    Anemia 11/29/2018    Anxiety 11/28/2017    Arthritis     Atrial fibrillation with RVR 10/09/2019    Back pain     Basal cell carcinoma 03/29/2018    left mid back    Chronic diastolic congestive heart failure 10/15/2019    CKD (chronic kidney disease) stage 3, GFR 30-59 ml/min 08/08/2016    Colon polyps     reported per patient.     Coronary artery calcification 10/05/2021    Elevated liver enzymes 12/20/2019    Elevated troponin 03/15/2018    Fuchs' corneal dystrophy     General anesthetics causing adverse effect in therapeutic use     woke up during surgery and gagged on ET tube    Glaucoma     Glaucoma     Heart failure with preserved left ventricular function (HFpEF) 07/24/2018    Hyperlipidemia     Hypertension     Macular degeneration dry    Medication side effects 05/03/2017    Obesity     Pre-diabetes 12/04/2017    PVD (peripheral vascular disease) 04/26/2021    PVD (peripheral vascular disease) 04/26/2021    Squamous cell carcinoma 01/08/2019    left shoulder    Stenosis of carotid artery 10/05/2021    Troponin level elevated 01/11/2023    Trouble in sleeping     Type 2 diabetes mellitus without complication, without long-term current use of insulin 02/24/2021    Urinary tract infection        Past Surgical History:   Procedure Laterality Date    A-V CARDIAC PACEMAKER INSERTION N/A 4/28/2025    Procedure: INSERTION, CARDIAC PACEMAKER, DUAL  CHAMBER;  Surgeon: Tran Nance MD;  Location: [facility] CATH LAB;  Service: Cardiology;  Laterality: N/A;    ABLATION OF ARRHYTHMOGENIC FOCUS FOR ATRIAL FIBRILLATION N/A 11/03/2022    Procedure: Ablation atrial fibrillation;  Surgeon: Toi Kelly MD;  Location: [facility] EP LAB;  Service: Cardiology;  Laterality: N/A;  afib, PVI/CTI, RFA, STACEY (cx if SR), DEDE, anes, MB, 3 Prep    ADENOIDECTOMY  1938    BREAST BIOPSY Left     1997. benign    BREAST CYST EXCISION Left 1997 approx    CARPAL TUNNEL RELEASE Right 2012 approx    CATARACT EXTRACTION Bilateral 1994    CHOLECYSTECTOMY  1975    CORNEAL TRANSPLANT Bilateral 08/2015 8/2015 - left; Right 4/2016    EYE SURGERY  12/06/2023    Fuch's Corneal dystrophy - had 2nd operation with Dr. Quintanilla    EYE SURGERY      Cataract wIOL    INSERTION, ELECTRODE LEAD, CARDIAC PACEMAKER, 1 ELECTRODE LEAD N/A 4/29/2025    Procedure: INSERTION, ELECTRODE LEAD, PACEMAKER, 1 ELECTRODE LEAD;  Surgeon: Tran Nance MD;  Location: [facility] CATH LAB;  Service: Cardiology;  Laterality: N/A;  atrial lead revision/ replacement--Biotronik    left thumb Left 2011    removed cuboid for arthritis    REVERSE TOTAL SHOULDER ARTHROPLASTY Left 08/21/2018    Procedure: ARTHROPLASTY, SHOULDER, TOTAL, REVERSE;  Surgeon: Solomon Lujan MD;  Location: [facility];  Service: Orthopedics;  Laterality: Left;  WITH BICEP TENODESIS    SKIN CANCER EXCISION Left 2017    BCC removal by Dr. Valadez    SLT- OS (aka TRABECULOPLASTY) Left 05/11/2011    repeat 3/14/12    TENOTOMY Left 08/21/2018    Procedure: TENOTOMY;  Surgeon: Solomon Lujan MD;  Location: [facility] OR;  Service: Orthopedics;  Laterality: Left;    TONSILLECTOMY  1938    TRANSESOPHAGEAL ECHOCARDIOGRAM WITH POSSIBLE CARDIOVERSION (STACEY W/ POSS CARDIOVERSION) N/A 03/21/2023    Procedure: Transesophageal echo (STACEY) intra-procedure log documentation;  Surgeon: Trevon Ramirez MD;  Location: [facility] CATH LAB;  Service: Cardiology;  Laterality: N/A;     TREATMENT OF CARDIAC ARRHYTHMIA N/A 10/10/2019    Procedure: CARDIOVERSION;  Surgeon: Pete Durán MD;  Location: [facility] CATH LAB;  Service: Cardiology;  Laterality: N/A;    TREATMENT OF CARDIAC ARRHYTHMIA N/A 12/06/2019    Procedure: CARDIOVERSION;  Surgeon: Samy Castillo MD;  Location: [facility] CATH LAB;  Service: Cardiology;  Laterality: N/A;       Time Tracking:     PT Received On: 04/30/25  PT Start Time: 1551  PT Stop Time: 1616  PT Total Time (min): 25 min     Billable Minutes: Evaluation 15min and Therapeutic Activity 10min    4/30/2025

## 2025-04-30 NOTE — PLAN OF CARE
A241/A241 EWELINA.  Shirley Metz is a 89 y.o.female admitted on 4/26/2025 for Symptomatic bradycardia   Code Status: Full Code MRN: [id number]   Review of patient's allergies indicates:   Allergen Reactions    Carvedilol Swelling     Lip swelling    Cephalexin Other (See Comments)     Muscle/joint weakness unable to move     Doxycycline Shortness Of Breath, Nausea And Vomiting and Other (See Comments)     weakness    Erythromycin Other (See Comments)     Complete weakness/could not walk    Gadolinium-containing contrast media Swelling     angioedema    Hydrocodone-acetaminophen Shortness Of Breath     wheezing    Labetalol Shortness Of Breath, Nausea Only, Palpitations and Other (See Comments)     weakness    Loratadine Other (See Comments)     Double vision/spots  Other reaction(s): double vision    Losartan Other (See Comments)     weakness    Loteprednol etabonate Other (See Comments), Palpitations, Anxiety and Shortness Of Breath     Patient stated bp went up and extreme muscle weakness    Naproxen      wheezing  wheezing  wheezing  Other reaction(s): wheezing    Prednisone Other (See Comments)     Myalgias and generalized weakness, unable to walk    Statins-hmg-coa reductase inhibitors Other (See Comments)     Severe Muscle weakness  Muscle weakness  Severe Muscle weakness  Other reaction(s): severe muscle weakness    Amlodipine Other (See Comments)     Myalgias    Fenofibrate Other (See Comments)     muscle weakness      Hydralazine Other (See Comments)     muscle tremors    Loteprednol Other (See Comments)     increased BP    Macrolide antibiotics Other (See Comments)     Complete weakness/could not walk      Moxifloxacin Hives and Other (See Comments)     muscle soreness    Timolol Other (See Comments)     Blurred vision, Burning eyes, Severe muscle weakness, eye problems.      Verapamil Diarrhea     Severe diarrhea.      Hydrocortisone     Isosorbide      Tolerates Imdur    Silver sulfadiaz-foam bandage      Tetanus and diphtheria toxoids     Tetanus vaccines and toxoid     Adhesive Rash     Clonidine patch--contact dermatitis    Codeine Diarrhea and Other (See Comments)     Loss of balance       Doxazosin Palpitations    Fexofenadine Other (See Comments)     Double vision/spots       Hydrocortisone (bulk) Other (See Comments)     Only suppository/ caused muscle weakness    Latanoprost Other (See Comments)     Muscle weakness      Olopatadine Other (See Comments)     Muscle weakness       Past Medical History:   Diagnosis Date    Acute on chronic diastolic congestive heart failure 01/11/2023    Anemia 11/29/2018    Anxiety 11/28/2017    Arthritis     Atrial fibrillation with RVR 10/09/2019    Back pain     Basal cell carcinoma 03/29/2018    left mid back    Chronic diastolic congestive heart failure 10/15/2019    CKD (chronic kidney disease) stage 3, GFR 30-59 ml/min 08/08/2016    Colon polyps     reported per patient.     Coronary artery calcification 10/05/2021    Elevated liver enzymes 12/20/2019    Elevated troponin 03/15/2018    Fuchs' corneal dystrophy     General anesthetics causing adverse effect in therapeutic use     woke up during surgery and gagged on ET tube    Glaucoma     Glaucoma     Heart failure with preserved left ventricular function (HFpEF) 07/24/2018    Hyperlipidemia     Hypertension     Macular degeneration dry    Medication side effects 05/03/2017    Obesity     Pre-diabetes 12/04/2017    PVD (peripheral vascular disease) 04/26/2021    PVD (peripheral vascular disease) 04/26/2021    Squamous cell carcinoma 01/08/2019    left shoulder    Stenosis of carotid artery 10/05/2021    Troponin level elevated 01/11/2023    Trouble in sleeping     Type 2 diabetes mellitus without complication, without long-term current use of insulin 02/24/2021    Urinary tract infection       PRN meds    acetaminophen, 650 mg, Q6H PRN  ALPRAZolam, 0.5 mg, Nightly PRN  artificial tears, 1 drop, PRN  bisacodyL, 10 mg,  Daily PRN  dextrose 50%, 12.5 g, PRN  dextrose 50%, 12.5 g, PRN  dextrose 50%, 25 g, PRN  glucagon (human recombinant), 1 mg, PRN  glucagon (human recombinant), 1 mg, PRN  glucose, 16 g, PRN  glucose, 24 g, PRN  insulin aspart U-100, 0-5 Units, Q6H PRN  naloxone, 0.02 mg, PRN  polyethylene glycol, 17 g, Daily PRN  prochlorperazine, 5 mg, Q6H PRN  sodium chloride 0.9%, 10 mL, Q12H PRN      Chart check completed. Will continue plan of care.        Pt oriented x4.  VSS.  Pt remained afebrile throughout this shift.   All meds administered per order.   Pt remained free of falls this shift.   Plan of care reviewed. Patient verbalizes understanding.   Pt moving/turning x2 assist   Bed low, side rails up x 2, wheels locked, call light in reach.   Patient instructed to call for assistance.  Patient education provided             Orientation: oriented x 4  [place] Coma Scale Score: 15     Lead Monitored: Lead II Rhythm: normal sinus rhythm    Cardiac/Telemetry Box Number: [id number]  VTE Core Measure: (SCDs) Sequential compression device initiated/maintained Last Bowel Movement: 04/28/25  Diet Heart Healthy Standard Tray  Voiding Characteristics: urethral catheter (bladder)  [place] Score: 14  Fall Risk Score: 14  Accucheck []   Freq?      Lines/Drains/Airways       Peripheral Intravenous Line  Duration                  Peripheral IV - Single Lumen 04/27/25 1315 20 G Distal;Posterior;Right Forearm 3 days

## 2025-04-30 NOTE — PLAN OF CARE
04/30/25 1151   Rounds   Attendance Provider;;Charge nurse   Discharge Plan A Independent living facility   Why the patient remains in the hospital Requires continued medical care   Transition of Care Barriers None     Pt remains hospitalized d/t continued medical care. Pt s/p pace maker procedure.   CM needs pending hospital course.  DC disposition is home/ return to independent living facility

## 2025-04-30 NOTE — ASSESSMENT & PLAN NOTE
"Patient has Diastolic (HFpEF) heart failure that is Acute on chronic. On presentation their CHF was decompensated. Evidence of decompensated CHF on presentation includes: orthopnea. The etiology of their decompensation is likely dietary indiscretion. Most recent BNP and echo results are listed below.  No results for input(s): "BNP" in the last 72 hours.    Latest ECHO  Results for orders placed during the hospital encounter of 04/26/25    Echo    Interpretation Summary    Left Ventricle: The left ventricle is normal in size. Normal wall thickness. There is normal systolic function with a visually estimated ejection fraction of 60 - 65%. Grade III diastolic dysfunction.    Right Ventricle: The right ventricle is normal in size. Wall thickness is normal. Systolic function is normal.    Left Atrium: Severely dilated    Mitral Valve: There is mild regurgitation.    Tricuspid Valve: There is mild to moderate regurgitation.    Pulmonary Artery: The estimated pulmonary artery systolic pressure is 54 mmHg.    IVC/SVC: Intermediate venous pressure at 8 mmHg.    Current Heart Failure Medications       Plan  - Monitor strict I&Os and daily weights.    - Place on telemetry  - Low sodium diet  - Place on fluid restriction of 1.5 L.   - Cardiology has been consulted  - The patient's volume status is at their baseline  - give 1 dose IV Lasix.    DC p.o. Lasix.          "

## 2025-04-30 NOTE — PROGRESS NOTES
[facility] - Telemetry ([facility]  Progress Note    Patient Name: Shirley Metz  MRN: [id number]  Patient Class: IP- Inpatient   Admission Date: 4/26/2025  Length of Stay: 4 days  Attending Physician: Savannah Nye MD  Primary Care Provider: Phylicia Deleon MD        Subjective     Principal Problem:Symptomatic bradycardia        HPI:  89-year-old  female with history of persistent AFib on Eliquis, chronic diastolic CHF, glaucoma, morbid obesity, hypertension presents from assisted living with complaints of dizziness.  States she was in her normal state of health when she suddenly felt dizzy, lightheaded, blurry vision felt faint.  This was persistent prompting ER visit.  In the ER was noted to have heart rate between 101 10, and bouts of bradycardia into the 30s with brief pauses.  Had intermittent PACs/PVCs.  She states her heart rate typically runs 100- 110 Has had several ablations by Dr. Kelly in the past.  Was on metoprolol but stopped 6 weeks ago due to issues with blood pressure running low.  She has also been on midodrine as needed.  Denies any chest pain, lower extremity edema, nausea, vomiting fevers, chills, cough congestion, diarrhea.  She states her    Labs notable for potassium 3.4, troponin 0.052, chest x-ray without any acute findings.  EKG her, heart rate 100, intermittent PVCs, no ischemic ST changes noted.          Overview/Hospital Course:  4/30 admitted to icu for symptomatic bradycardia. Cardiology consulted and permanent pacemaker placed. Transferred out of icu. Ok of discharge per cardiology but patient now complaining of bilateral feet pain. Denies trauma but thinks may have occurred during ambulance ride to hospital.    Interval History: See hospital course for today      Review of Systems   Constitutional:  Positive for activity change and fatigue.   Respiratory:  Negative for shortness of breath.    Gastrointestinal:  Negative for nausea and  vomiting.   Musculoskeletal:  Positive for arthralgias, gait problem and myalgias.   Skin:  Positive for wound.   Neurological:  Positive for weakness.   Psychiatric/Behavioral:  The patient is nervous/anxious.      Objective:     Vital Signs (Most Recent):  Temp: 98.2 °F (36.8 °C) (04/30/25 1139)  Pulse: 79 (04/30/25 1139)  Resp: 18 (04/30/25 1139)  BP: (!) 104/54 (04/30/25 1139)  SpO2: (!) 94 % (04/30/25 1139) Vital Signs (24h Range):  Temp:  [98.2 °F (36.8 °C)-98.9 °F (37.2 °C)] 98.2 °F (36.8 °C)  Pulse:  [65-86] 79  Resp:  [17-31] 18  SpO2:  [88 %-100 %] 94 %  BP: ()/(50-64) 104/54     Weight: 82.4 kg (181 lb 10.5 oz)  Body mass index is 31.18 kg/m².    Intake/Output Summary (Last 24 hours) at 4/30/2025 1412  Last data filed at 4/30/2025 0830  Gross per 24 hour   Intake 240 ml   Output 200 ml   Net 40 ml         Physical Exam  Vitals and nursing note reviewed. Exam conducted with a chaperone present (charge).   Constitutional:       General: She is not in acute distress.     Appearance: She is ill-appearing. She is not toxic-appearing.   HENT:      Head: Normocephalic and atraumatic.   Cardiovascular:      Rate and Rhythm: Normal rate.   Pulmonary:      Effort: Pulmonary effort is normal. No respiratory distress.   Chest:       Abdominal:      Palpations: Abdomen is soft.      Tenderness: There is no abdominal tenderness.   Musculoskeletal:         General: Tenderness present.      Right knee: Normal. Normal range of motion.      Left knee: Normal. Normal range of motion.      Right lower leg: No edema.      Left lower leg: No edema.      Right ankle: Decreased range of motion.      Left ankle: Decreased range of motion.      Right foot: Tenderness present.      Left foot: Tenderness present.      Comments: Left arm in shoulder sling   Skin:     General: Skin is warm.      Capillary Refill: Capillary refill takes less than 2 seconds.      Coloration: Skin is pale.   Neurological:      Mental Status: She is  "alert and oriented to person, place, and time.      Motor: Weakness present.   Psychiatric:         Mood and Affect: Mood is anxious.               Significant Labs: All pertinent labs within the past 24 hours have been reviewed.  A1C:   Recent Labs   Lab 04/28/25  0300   HGBA1C 7.7*     CBC:   Recent Labs   Lab 04/29/25  0513 04/30/25  0629   WBC 10.69 11.40   HGB 11.7* 10.8*   HCT 34.8* 33.9*    183     CMP:   Recent Labs   Lab 04/29/25  0513 04/30/25  0629    137   K 4.4 4.0   CL 99 99   CO2 26 26   * 129*   BUN 52* 58*   CREATININE 2.0* 1.9*   CALCIUM 9.6 9.2   ANIONGAP 13 12     POCT Glucose:   Recent Labs   Lab 04/29/25  2305 04/30/25  0538 04/30/25  1149   POCTGLUCOSE 194* 139* 133*       Significant Imaging: I have reviewed all pertinent imaging results/findings within the past 24 hours.      Assessment & Plan  Symptomatic bradycardia  -admit to inpatient  -monitor on tele  -hold darryl blocking agents, hold Eliquis, check 2D echo  -cardiology consulted recs appreciated  Status post ppm   Resume bystolic on discharge   Chronic diastolic CHF (congestive heart failure)  Patient has Diastolic (HFpEF) heart failure that is Chronic. On presentation their CHF was well compensated. Most recent BNP and echo results are listed below.  No results for input(s): "BNP" in the last 72 hours.    Latest ECHO  Results for orders placed during the hospital encounter of 07/11/22    Echo    Interpretation Summary  · Concentric remodeling and normal systolic function.  · The estimated ejection fraction is 60%.  · Normal left ventricular diastolic function.  · Normal right ventricular size with normal right ventricular systolic function.    Current Heart Failure Medications       Plan  - Monitor strict I&Os and daily weights.    - Place on telemetry  - Low sodium diet  - Place on fluid restriction of 1.5 L.   - Cardiology has been consulted  - The patient's volume status is at their baseline  Resume bystolic on " "discharge       Persistent atrial fibrillation  Patient has persistent (7 days or more) atrial fibrillation. Patient is currently in sinus rhythm. [id number] Score: 5. The patients heart rate in the last 24 hours is as follows:  Pulse  Min: 65  Max: 86     Antiarrhythmics       Anticoagulants       Plan  - holding Eliquis for possible pacemaker placement  -holding all darryl blocking agents due to bradycardia  Resume eliquis on evening of 5/1      HTN (hypertension)  Patient's blood pressure range in the last 24 hours was: BP  Min: 99/50  Max: 135/60.The patient's inpatient anti-hypertensive regimen is listed below:  Current Antihypertensives       Plan  - resume lasix on discharge   COAG (chronic open-angle glaucoma) - Both Eyes  -resume home eye drops.  Patient brought her own.    Obesity (BMI 30.0-34.9)  -educated on dietary changes    Type 2 diabetes mellitus with chronic kidney disease, without long-term current use of insulin  -diet controlled.  Monitor daily glucose.  Tachy-ed syndrome      Sick sinus syndrome      Chronic diastolic congestive heart failure  Patient has Diastolic (HFpEF) heart failure that is Chronic. On presentation their CHF was well compensated. Most recent BNP and echo results are listed below.  No results for input(s): "BNP" in the last 72 hours.  Latest ECHO  Results for orders placed during the hospital encounter of 04/26/25    Echo    Interpretation Summary    Left Ventricle: The left ventricle is normal in size. Normal wall thickness. There is normal systolic function with a visually estimated ejection fraction of 60 - 65%. Grade III diastolic dysfunction.    Right Ventricle: The right ventricle is normal in size. Wall thickness is normal. Systolic function is normal.    Left Atrium: Severely dilated    Mitral Valve: There is mild regurgitation.    Tricuspid Valve: There is mild to moderate regurgitation.    Pulmonary Artery: The estimated pulmonary artery systolic pressure is 54 " mmHg.    IVC/SVC: Intermediate venous pressure at 8 mmHg.    Current Heart Failure Medications       Plan  - Monitor strict I&Os and daily weights.    - Place on telemetry  - Low sodium diet  - Place on fluid restriction of 1.5 L.   - Cardiology has been consulted  - The patient's volume status is at their baseline  - lasix prn      Pain in both feet  Denies falling but may have occurred during ambulance ride  Exam reassuring  Multiple allergies  Follow up outpatient primary care provider   Physical/occupational therapy       VTE Risk Mitigation (From admission, onward)           Ordered     IP VTE HIGH RISK PATIENT  Once         04/26/25 1621     Place sequential compression device  Until discontinued         04/26/25 1621                    Discharge Planning   SHAYNA: 5/1/2025     Code Status: Full Code   Medical Readiness for Discharge Date:   Discharge Plan A: Independent living facility                      Savannah Nye MD  Department of Hospital Medicine   [facility] - Telemetry ([facility])

## 2025-04-30 NOTE — ASSESSMENT & PLAN NOTE
Hold Eliquis.    No Lovenox or heparin for DVT prophylaxis.    NPO after midnight.    Last dose of Eliquis was Friday evening.    Plan for dual-chamber pacemaker in a.m..    Continue to hold Bystolic.        04/28/2025.    Dual-chamber pacemaker today.    Still have multiple pauses.      4/30/25  S/p PPM  Can resume bystolic upon DC  F/u in arrhythmia clinic  Sling for 24/7 for first 2 days then at night for 4 weeks

## 2025-04-30 NOTE — ASSESSMENT & PLAN NOTE
"Patient has Diastolic (HFpEF) heart failure that is Chronic. On presentation their CHF was well compensated. Most recent BNP and echo results are listed below.  No results for input(s): "BNP" in the last 72 hours.  Latest ECHO  Results for orders placed during the hospital encounter of 04/26/25    Echo    Interpretation Summary    Left Ventricle: The left ventricle is normal in size. Normal wall thickness. There is normal systolic function with a visually estimated ejection fraction of 60 - 65%. Grade III diastolic dysfunction.    Right Ventricle: The right ventricle is normal in size. Wall thickness is normal. Systolic function is normal.    Left Atrium: Severely dilated    Mitral Valve: There is mild regurgitation.    Tricuspid Valve: There is mild to moderate regurgitation.    Pulmonary Artery: The estimated pulmonary artery systolic pressure is 54 mmHg.    IVC/SVC: Intermediate venous pressure at 8 mmHg.    Current Heart Failure Medications       Plan  - Monitor strict I&Os and daily weights.    - Place on telemetry  - Low sodium diet  - Place on fluid restriction of 1.5 L.   - Cardiology has been consulted  - The patient's volume status is at their baseline  - lasix prn      "

## 2025-04-30 NOTE — PLAN OF CARE
See eval for details. Pt displayed deficits with functional mobility/ transfers, deficits with adl's skills also decrease b ue strength/endurance. Recommendation: SNF

## 2025-05-01 LAB
BACTERIA BLD CULT: NORMAL
BACTERIA BLD CULT: NORMAL
POCT GLUCOSE: 133 MG/DL (ref 70–110)
POCT GLUCOSE: 176 MG/DL (ref 70–110)
POCT GLUCOSE: 197 MG/DL (ref 70–110)

## 2025-05-01 PROCEDURE — 99499 UNLISTED E&M SERVICE: CPT | Mod: HCNC,,, | Performed by: INTERNAL MEDICINE

## 2025-05-01 PROCEDURE — 97530 THERAPEUTIC ACTIVITIES: CPT | Mod: HCNC

## 2025-05-01 PROCEDURE — 25000003 PHARM REV CODE 250: Mod: HCNC | Performed by: NURSE PRACTITIONER

## 2025-05-01 PROCEDURE — 25000003 PHARM REV CODE 250: Mod: HCNC | Performed by: INTERNAL MEDICINE

## 2025-05-01 PROCEDURE — 97110 THERAPEUTIC EXERCISES: CPT | Mod: HCNC

## 2025-05-01 PROCEDURE — 25000003 PHARM REV CODE 250: Mod: HCNC

## 2025-05-01 PROCEDURE — 97530 THERAPEUTIC ACTIVITIES: CPT | Mod: HCNC,CQ

## 2025-05-01 PROCEDURE — 94761 N-INVAS EAR/PLS OXIMETRY MLT: CPT | Mod: HCNC

## 2025-05-01 PROCEDURE — 21400001 HC TELEMETRY ROOM: Mod: HCNC

## 2025-05-01 RX ORDER — NEBIVOLOL 5 MG/1
5 TABLET ORAL DAILY
Status: DISCONTINUED | OUTPATIENT
Start: 2025-05-02 | End: 2025-05-02 | Stop reason: HOSPADM

## 2025-05-01 RX ADMIN — CEPHALEXIN 500 MG: 500 CAPSULE ORAL at 06:05

## 2025-05-01 RX ADMIN — CEPHALEXIN 500 MG: 500 CAPSULE ORAL at 02:05

## 2025-05-01 RX ADMIN — ACETAMINOPHEN 650 MG: 325 TABLET ORAL at 09:05

## 2025-05-01 RX ADMIN — FAMOTIDINE 20 MG: 20 TABLET, FILM COATED ORAL at 08:05

## 2025-05-01 RX ADMIN — APIXABAN 2.5 MG: 2.5 TABLET, FILM COATED ORAL at 09:05

## 2025-05-01 RX ADMIN — ACETAMINOPHEN 650 MG: 325 TABLET ORAL at 10:05

## 2025-05-01 RX ADMIN — MUPIROCIN: 20 OINTMENT TOPICAL at 09:05

## 2025-05-01 RX ADMIN — CEPHALEXIN 500 MG: 500 CAPSULE ORAL at 09:05

## 2025-05-01 RX ADMIN — MUPIROCIN: 20 OINTMENT TOPICAL at 08:05

## 2025-05-01 NOTE — ASSESSMENT & PLAN NOTE
Denies falling but may have occurred during ambulance ride  Admits to improvement in right foot  Exam reassuring  Multiple allergies  Follow up outpatient primary care provider   Physical/occupational therapy recommending skilled nursing facility  Needs max encouragement

## 2025-05-01 NOTE — SUBJECTIVE & OBJECTIVE
Interval History: See hospital course for today      Review of Systems   Constitutional:  Positive for activity change. Negative for fever.   Respiratory:  Negative for shortness of breath.    Cardiovascular:  Negative for chest pain.   Gastrointestinal:  Negative for nausea and vomiting.   Musculoskeletal:  Positive for gait problem and myalgias.   Skin:  Positive for wound.   Neurological:  Positive for weakness.   Psychiatric/Behavioral:  The patient is nervous/anxious.      Objective:     Vital Signs (Most Recent):  Temp: 98.1 °F (36.7 °C) (05/01/25 1209)  Pulse: 76 (05/01/25 1529)  Resp: 18 (05/01/25 1209)  BP: (!) 146/65 (05/01/25 1209)  SpO2: (!) 94 % (05/01/25 1209) Vital Signs (24h Range):  Temp:  [97.9 °F (36.6 °C)-98.9 °F (37.2 °C)] 98.1 °F (36.7 °C)  Pulse:  [68-89] 76  Resp:  [18-20] 18  SpO2:  [89 %-94 %] 94 %  BP: (115-146)/(56-82) 146/65     Weight: 85.2 kg (187 lb 13.3 oz)  Body mass index is 32.24 kg/m².    Intake/Output Summary (Last 24 hours) at 5/1/2025 1616  Last data filed at 5/1/2025 1230  Gross per 24 hour   Intake 480 ml   Output --   Net 480 ml         Physical Exam  Vitals and nursing note reviewed. Exam conducted with a chaperone present (son, nursing, charge).   Constitutional:       General: She is not in acute distress.     Appearance: She is obese. She is ill-appearing. She is not toxic-appearing.   HENT:      Head: Normocephalic and atraumatic.   Cardiovascular:      Rate and Rhythm: Normal rate.   Pulmonary:      Effort: Pulmonary effort is normal. No respiratory distress.   Chest:       Abdominal:      Palpations: Abdomen is soft.      Tenderness: There is no abdominal tenderness.   Musculoskeletal:         General: Tenderness present. No deformity.      Right lower leg: No edema.      Left lower leg: No edema.   Skin:     General: Skin is warm.      Coloration: Skin is pale.      Findings: Wound present.   Neurological:      Mental Status: She is alert and oriented to person,  place, and time.      Motor: Weakness present.   Psychiatric:         Mood and Affect: Mood is anxious.         Speech: Speech is tangential.               Significant Labs: All pertinent labs within the past 24 hours have been reviewed.  CBC:   Recent Labs   Lab 04/30/25  0629   WBC 11.40   HGB 10.8*   HCT 33.9*        CMP:   Recent Labs   Lab 04/30/25  0629      K 4.0   CL 99   CO2 26   *   BUN 58*   CREATININE 1.9*   CALCIUM 9.2   ANIONGAP 12     POCT Glucose:   Recent Labs   Lab 04/30/25  2223 05/01/25  0613 05/01/25  1221   POCTGLUCOSE 147* 133* 176*       Significant Imaging: I have reviewed all pertinent imaging results/findings within the past 24 hours.

## 2025-05-01 NOTE — PT/OT/SLP PROGRESS
"Physical Therapy  Treatment    Shirley Metz   MRN: [id number]   Admitting Diagnosis: Symptomatic bradycardia    PT Received On: 05/01/25  PT Start Time: 1045     PT Stop Time: 1130    PT Total Time (min): 45 min       Billable Minutes:  Therapeutic Activity 45    Treatment Type: Treatment  PT/PTA: PTA     Number of PTA visits since last PT visit: 1       General Precautions: Standard, fall, pacemaker  Orthopedic Precautions: LUE non weight bearing  Braces: Sling and swathe (per MD: "she can take the sling off during daytime but still with same precautions and wear it at night for the next 4 weeks")  Respiratory Status: Room air    Spiritual, Cultural Beliefs, Jew Practices, Values that Affect Care: no    Subjective:  Completed Epic chart review prior to PT session.   Patient initially resistant to participate in PT session stating "My legs hurt." Educated and encouraged patient on importance of full and active participation  Patient also states "The doctor told me I don't have to wear that sling and I can use my arm." (See objective section)  "The doctor told me there is nothing broken or hurt in my legs."    Pain/Comfort  Pain Rating 1: 9/10  Location - Side 1: Bilateral (L>R)  Location - Orientation 1: generalized  Location 1: foot  Pain Addressed 1: Other (see comments), Nurse notified, Distraction (ACTIVITY PACING)    Objective:   Patient found with: peripheral IV, telemetry, Other (comments) (AVASYS)    In response to patient's statement about freely using L arm and not using sling:   I educated the patient that I had seen no documentation in her chart, which was checked while in her room via computer, that she was allowed to weight bear or move L UE without restriction.   Educated and encouraged patient to comply with typical PPM precautions until therapist was able to confirm with cardio MD. Secure chat sent to cardio MD after session was completed and response was as follows: "she can take the " "sling off during daytime but still with same precautions and wear it at night for the next 4 weeks". Plan to continue to re enforce PPM precautions each session as well as educate nurse for further re enforcement.   Also confirmed with hospital medicine MD that patient is negative for acute injury to BLE    Supine > sit EOB: Min A (increased time to complete)    Forward scoot towards EOB: CGA    Seated EOB x 15 min total focusing on increased tolerance to upright position, core stability, trunk control and CV endurance.   Maintained self supported sitting balance with SBA  Maintained unsupported sitting balance with  SBA    Completed x10 reps AROM TE to BLE: LAQ, Hip Flex, AP   Intermittent cues given as needed to maintain correct form during repetitions    After much encouragement, patient agreed to attempt standing but insisted on "only standing on my right leg". Educated patient that this was unsafe and un necessary as she had no weight bearing restrictions to her leg but patient insisted.     X3 attempts STS from EOB (maintaining NWB to LUE and self maintaining NWB to LLE for 2/3 trials) but unable to achieve clearance from EOB due to lack of active push through BLE.   On final attempt, patient was instructed to plant feet firmly on the floor and attempt to stand again. Patient, again, did not comply and slid L foot forward to reduce weightbearing.     At this time, chair transfer not attempted as patient is unwilling/unable to bear weight through BLE despite being cued to do so and appearing to have full ROM during therex.     Educated patient on importance of increased tolerance to upright position and direct impact on CV endurance and strength. Patient encouraged to utilize elevated HOB/ sit EOB to simulate chair position until able to safely complete chair T/F. Patient given a minimum goal of majority of the day to be spent in upright, especially with all meals. Encouraged patient to perform AROM TE to BLE " throughout the day within all available planes of motion. Re enforced importance of utilizing call light to meet needs in room and not attempt to get up without staff assistance. Patient verbalized understanding and agreed to comply.       AM-PAC 6 CLICK MOBILITY  How much help from another person does this patient currently need?   1 = Unable, Total/Dependent Assistance  2 = A lot, Maximum/Moderate Assistance  3 = A little, Minimum/Contact Guard/Supervision  4 = None, Modified [place]/Independent    Turning over in bed (including adjusting bedclothes, sheets and blankets)?: 3  Sitting down on and standing up from a chair with arms (e.g., wheelchair, bedside commode, etc.): 1  Moving from lying on back to sitting on the side of the bed?: 3  Moving to and from a bed to a chair (including a wheelchair)?: 1  Need to walk in hospital room?: 1  Climbing 3-5 steps with a railing?: 1 (NT)  Basic Mobility Total Score: 10    AM-PAC Raw Score CMS G-Code Modifier Level of Impairment Assistance   6 % Total / Unable   7 - 9 CM 80 - 100% Maximal Assist   10 - 14 CL 60 - 80% Moderate Assist   15 - 19 CK 40 - 60% Moderate Assist   20 - 22 CJ 20 - 40% Minimal Assist   23 CI 1-20% SBA / CGA   24 CH 0% Independent/ Mod I     Patient left sitting edge of bed (supported with cushions and pillows) with call button in reach, bed alarm on, and AVASYS present.    Assessment:  Shirley Metz is a 89 y.o. female with a medical diagnosis of Symptomatic bradycardia and presents with overall decline in functional mobility. Patient would continue to benefit from skilled PT to address functional limitations listed below in order to return to PLOF/decrease caregiver burden.     Rehab identified problem list/impairments: weakness, impaired endurance, impaired self care skills, impaired functional mobility, gait instability, impaired balance, pain, decreased safety awareness, decreased lower extremity function, decreased upper  extremity function, decreased coordination, impaired cognition, decreased ROM, orthopedic precautions    Rehab potential is fair.    Activity tolerance: Fair    Discharge recommendations: Moderate Intensity Therapy      Barriers to discharge:      Equipment recommendations: to be determined by next level of care     GOALS:   Multidisciplinary Problems       Physical Therapy Goals          Problem: Physical Therapy    Goal Priority Disciplines Outcome Interventions   Physical Therapy Goal     PT, PT/OT     Description: Goals to be met by 5/14/25.  1. Pt will complete bed mobility SBA.  2. Pt will complete sit to stand SBA.  3. Pt will ambulate 100ft CGA using LRAD.  4. Pt will increase AMPAC score by 2 points to progress functional mobility.                       DME Justifications:  No DME recommended requiring DME justifications    PLAN:    Patient to be seen 3 x/week to address the above listed problems via gait training, therapeutic activities, therapeutic exercises  Plan of Care expires: 05/14/25  Plan of Care reviewed with: patient         05/01/2025

## 2025-05-01 NOTE — ASSESSMENT & PLAN NOTE
Patient's blood pressure range in the last 24 hours was: BP  Min: 115/56  Max: 146/65.The patient's inpatient anti-hypertensive regimen is listed below:  Current Antihypertensives       Plan  - resume lasix on discharge   Resume home bystolic

## 2025-05-01 NOTE — ASSESSMENT & PLAN NOTE
Patient has persistent (7 days or more) atrial fibrillation. Patient is currently in sinus rhythm. [id number] Score: 5. The patients heart rate in the last 24 hours is as follows:  Pulse  Min: 68  Max: 89     Antiarrhythmics       Anticoagulants  apixaban tablet 2.5 mg, 2 times daily, Oral    Plan  - holding Eliquis for possible pacemaker placement  -holding all darryl blocking agents due to bradycardia

## 2025-05-01 NOTE — ASSESSMENT & PLAN NOTE
-admit to inpatient  -monitor on tele  -hold darryl blocking agents, hold Eliquis, check 2D echo  -cardiology consulted recs appreciated    Status post ppm   Resume bystolic   Eliquis restarted

## 2025-05-01 NOTE — PROGRESS NOTES
[facility] - Telemetry ([facility])  Cardiology  Progress Note    Patient Name: Shirley Metz  MRN: [id number]  Admission Date: 4/26/2025  Hospital Length of Stay: 5 days  Code Status: Full Code   Attending Physician: Savannah Nye MD   Primary Care Physician: Phylicia Deleon MD  Expected Discharge Date: 5/1/2025  Principal Problem:Symptomatic bradycardia    Subjective:     Hospital Course:   04/27/2025.    Multiple pauses.  Some up to 5 seconds.  Moved overnight from tele to the ICU for closer monitoring  Eliquis on hold  Tachy-ed syndrome.    In the ICU.  Has pacer pads on.    Discussed in length risks and benefits of pacemaker.      4.28.2025  Still having multiple pauses.    Longest so far about 6 seconds and symptomatic    4/30/25 pt seen and examined today denies any CP at this time, has multiple complaints about weakness, unable to walk. Does not feel strong enough to be DC. Labs reviewed, chart reviewed, PPM site C/D/I sling in place. Can resume her bystolic as OP    May 1, 2025.    Pacemaker site looking good.    Patient may resume her home Bystolic.  Not on formulary.  Patient very particular about her medications as she has had multiple side effects and reactions/so named allergies    Interval History:    Review of Systems   Cardiovascular:  Negative for chest pain, dyspnea on exertion, palpitations and syncope.   Neurological:  Negative for focal weakness.     Objective:     Vital Signs (Most Recent):  Temp: 98.2 °F (36.8 °C) (05/01/25 0736)  Pulse: 83 (05/01/25 0911)  Resp: 18 (05/01/25 0739)  BP: (!) 115/56 (05/01/25 0736)  SpO2: (!) 93 % (05/01/25 0900) Vital Signs (24h Range):  Temp:  [97.9 °F (36.6 °C)-98.9 °F (37.2 °C)] 98.2 °F (36.8 °C)  Pulse:  [68-89] 83  Resp:  [18-20] 18  SpO2:  [89 %-94 %] 93 %  BP: (104-139)/(54-82) 115/56     Weight: 85.2 kg (187 lb 13.3 oz)  Body mass index is 32.24 kg/m².     SpO2: (!) 93 %         Intake/Output Summary (Last 24 hours) at 5/1/2025 1115  Last  "data filed at 5/1/2025 0852  Gross per 24 hour   Intake 480 ml   Output --   Net 480 ml       Lines/Drains/Airways       Peripheral Intravenous Line  Duration                  Peripheral IV - Single Lumen 04/27/25 1315 20 G Distal;Posterior;Right Forearm 3 days                       Physical Exam  Vitals reviewed.   Constitutional:       Appearance: She is well-developed.   Neck:      Vascular: No carotid bruit.   Cardiovascular:      Rate and Rhythm: Normal rate and regular rhythm.      Pulses: Intact distal pulses.      Heart sounds: Normal heart sounds. No murmur heard.  Pulmonary:      Breath sounds: Normal breath sounds.   Neurological:      Mental Status: She is oriented to person, place, and time.            Significant Labs:   Recent Lab Results         05/01/25  0613   04/30/25  2223   04/30/25  1149        POCT Glucose 133   147   133               Significant Imaging:  Assessment and Plan:     Brief HPI:    * Symptomatic bradycardia  See tachy-ed syndrome    Chronic diastolic congestive heart failure  Patient has Diastolic (HFpEF) heart failure that is Acute on chronic. On presentation their CHF was decompensated. Evidence of decompensated CHF on presentation includes: orthopnea. The etiology of their decompensation is likely dietary indiscretion. Most recent BNP and echo results are listed below.  No results for input(s): "BNP" in the last 72 hours.    Latest ECHO  Results for orders placed during the hospital encounter of 04/26/25    Echo    Interpretation Summary    Left Ventricle: The left ventricle is normal in size. Normal wall thickness. There is normal systolic function with a visually estimated ejection fraction of 60 - 65%. Grade III diastolic dysfunction.    Right Ventricle: The right ventricle is normal in size. Wall thickness is normal. Systolic function is normal.    Left Atrium: Severely dilated    Mitral Valve: There is mild regurgitation.    Tricuspid Valve: There is mild to moderate " regurgitation.    Pulmonary Artery: The estimated pulmonary artery systolic pressure is 54 mmHg.    IVC/SVC: Intermediate venous pressure at 8 mmHg.    Current Heart Failure Medications       Plan  - Monitor strict I&Os and daily weights.    - Place on telemetry  - Low sodium diet  - Place on fluid restriction of 1.5 L.   - Cardiology has been consulted  - The patient's volume status is at their baseline  - give 1 dose IV Lasix.    DC p.o. Lasix.            Sick sinus syndrome  See tachy-ed syndrome    4/30/25  S/p PPM    Tachy-ed syndrome  Hold Eliquis.    No Lovenox or heparin for DVT prophylaxis.    NPO after midnight.    Last dose of Eliquis was Friday evening.    Plan for dual-chamber pacemaker in a.m..    Continue to hold Bystolic.        04/28/2025.    Dual-chamber pacemaker today.    Still have multiple pauses.      4/30/25  S/p PPM  Can resume bystolic upon DC  F/u in arrhythmia clinic  Sling for 24/7 for first 2 days then at night for 4 weeks    May 1, 2025.    She may resume her home Bystolic as inpatient.  Explained to patient that she will have to have her Bystolic given to the nurse for proper administration while in hospital.    She can resume her Eliquis tonight.    Continue SCDs for DVT prophylaxis.  Disposition as per Hospital Medicine.    Finish 5 days of Keflex.        Persistent atrial fibrillation  Can resume home bystolic as OP  Can resume her eliquis tomorrow night, 5/1 pm    Obesity (BMI 30.0-34.9)  Weight loss    HTN (hypertension)  Titrate medications, can resume home bystolic    We will sign off.  Please call us back with questions as needed  Disposition as per Hospital Medicine  Follow-up in clinic for staples removal    VTE Risk Mitigation (From admission, onward)           Ordered     IP VTE HIGH RISK PATIENT  Once         04/26/25 1621     Place sequential compression device  Until discontinued         04/26/25 1621                    Tran Nance MD  Cardiology  [facility] -  Telemetry ([facility])

## 2025-05-01 NOTE — PT/OT/SLP PROGRESS
"Occupational Therapy   Treatment    Name: Shirley Metz  MRN: [id number]  Admitting Diagnosis:  Symptomatic bradycardia  2 Days Post-Op    Recommendations:     Discharge Recommendations: Moderate Intensity Therapy  Discharge Equipment Recommendations:  to be determined by next level of care  Barriers to discharge:  Decreased caregiver support    Assessment:     Shirley Metz is a 89 y.o. female with a medical diagnosis of Symptomatic bradycardia.  She presents with the following performance deficits affecting function are weakness, impaired endurance, impaired sensation, impaired self care skills, impaired functional mobility, gait instability, impaired balance, decreased coordination, decreased upper extremity function, decreased lower extremity function, decreased safety awareness, pain, decreased ROM, impaired cardiopulmonary response to activity.     Rehab Prognosis:  Fair; patient would benefit from acute skilled OT services to address these deficits and reach maximum level of function.       Plan:     Patient to be seen 2 x/week to address the above listed problems via self-care/home management, therapeutic activities, therapeutic exercises  Plan of Care Expires: 05/14/25  Plan of Care Reviewed with: patient    Subjective     Chief Complaint: B FOOT PAIN, L>>R  "I can't do that. My feet hurt."  "The doctor told me there is nothing broken in my feet. They're not swollen."  "The doctor told me I don't have to wear that sling."  Patient/Family Comments/goals: get better; improve strength and mobility  Pain/Comfort:  Pain Rating 1: 9/10  Location - Side 1: Bilateral (L>>R)  Location - Orientation 1: generalized  Location 1: foot  Pain Addressed 1: Reposition, Distraction  Pain Rating Post-Intervention 1: 9/10    Objective:     Communicated with: Nurse and epic chart review prior to session.  Patient found HOB elevated with peripheral IV, telemetry, Other (comments) (AVASYS) upon OT entry to " "room.    General Precautions: Standard, fall, pacemaker    Orthopedic Precautions:LUE non weight bearing  Braces: Sling and swathe (doffed upon therapist arrival. Per MD, "she can take the sling off during daytime but still with same precautions and wear it at night for the next 4 weeks.")  Respiratory Status: Room air     Occupational Performance:     Bed Mobility:    Patient completed Rolling/Turning to Left with  minimum assistance  Patient completed Supine to Sit with minimum assistance   Forward scoot to EOB with CGA.  Required increased time to complete and verbal cueing to maintain pacemaker precautions.    Functional Mobility/Transfers:  Attempted Sit<>Stand x3 trials with Max A x2 and HHA, but pt unable to complete.  Pt maintaining LUE NWB throughout to comply with pacemaker precautions  Pt also self- maintaining LLE NWB d/t pain despite education on safety concerns and no weightbearing restrictions to BLE  Verbal cueing for extension of BLE to achieve stand, but pt with no active push through legs  Pt unable to clear bottom from EOB at this time    Activities of Daily Living:  Lower Body Dressing: total assistance jenny socks    [facility] 6 Click ADL: 15    Treatment & Education:  Pt performed x10 reps BUE AROM therex while EOB:  Shoulder flexion (to 90* d/t pacemaker precautions)  Elbow flexion/ext  Digit flexion/ext  Reviewed pacemaker precautions, functional implications, and importance of compliance. Pt required consistent verbal cueing throughout session to maintain precautions. Pt required max encouragement to participate in OT session. Reviewed role of OT in acute setting and benefits of participation. Educated on techniques to use to increase independence and decrease fall risk with functional transfers. Educated on importance of OOB activity and calling for A to transfer and meet needs. Encouraged completion of B UE AROM therex throughout the day to tolerance to increase functional strength and " activity tolerance. Educated patient on importance of increased tolerance to upright position and direct impact on CV endurance and strength. Patient encouraged to sit up EOB for a minimum of 2 consecutive hours per day. Patient stated understanding and in agreement with POC.     Patient left sitting edge of bed with all lines intact, call button in reach, bed alarm on, and MD notified    GOALS:   Multidisciplinary Problems       Occupational Therapy Goals          Problem: Occupational Therapy    Goal Priority Disciplines Outcome Interventions   Occupational Therapy Goal     OT, PT/OT Progressing    Description: O.T. GOALS TO BE MET 5-14-20  PT WILL REQ MOD A WITH BSC TRANSFER  MOD A WITH LE DRESSING  MOD A WITH UE DRESSING  MOD A WITH TOILETING                       Time Tracking:     OT Date of Treatment: 05/01/25  OT Start Time: 1045  OT Stop Time: 1130  OT Total Time (min): 45 min    Billable Minutes:Therapeutic Activity 30  Therapeutic Exercise 15    OT/ODETTE: OT     Number of ODETTE visits since last OT visit: 0  Jade Suazo OT     5/1/2025

## 2025-05-01 NOTE — PLAN OF CARE
05/01/25 1605   Post-Acute Status   Post-Acute Authorization Placement   Post-Acute Placement Status Pending payor review/awaiting authorization (if required)   Discharge Delays None known at this time   Discharge Plan   Discharge Plan [facility] accepting  Pt updated at bedside and agreeable to plan; son present during discussion.  SW contacted facility rep to submit for authorization  142 pending.

## 2025-05-01 NOTE — SUBJECTIVE & OBJECTIVE
Interval History:    Review of Systems   Cardiovascular:  Negative for chest pain, dyspnea on exertion, palpitations and syncope.   Neurological:  Negative for focal weakness.     Objective:     Vital Signs (Most Recent):  Temp: 98.2 °F (36.8 °C) (05/01/25 0736)  Pulse: 83 (05/01/25 0911)  Resp: 18 (05/01/25 0739)  BP: (!) 115/56 (05/01/25 0736)  SpO2: (!) 93 % (05/01/25 0900) Vital Signs (24h Range):  Temp:  [97.9 °F (36.6 °C)-98.9 °F (37.2 °C)] 98.2 °F (36.8 °C)  Pulse:  [68-89] 83  Resp:  [18-20] 18  SpO2:  [89 %-94 %] 93 %  BP: (104-139)/(54-82) 115/56     Weight: 85.2 kg (187 lb 13.3 oz)  Body mass index is 32.24 kg/m².     SpO2: (!) 93 %         Intake/Output Summary (Last 24 hours) at 5/1/2025 1115  Last data filed at 5/1/2025 0852  Gross per 24 hour   Intake 480 ml   Output --   Net 480 ml       Lines/Drains/Airways       Peripheral Intravenous Line  Duration                  Peripheral IV - Single Lumen 04/27/25 1315 20 G Distal;Posterior;Right Forearm 3 days                       Physical Exam  Vitals reviewed.   Constitutional:       Appearance: She is well-developed.   Neck:      Vascular: No carotid bruit.   Cardiovascular:      Rate and Rhythm: Normal rate and regular rhythm.      Pulses: Intact distal pulses.      Heart sounds: Normal heart sounds. No murmur heard.  Pulmonary:      Breath sounds: Normal breath sounds.   Neurological:      Mental Status: She is oriented to person, place, and time.            Significant Labs:   Recent Lab Results         05/01/25  0613   04/30/25  2223   04/30/25  1149        POCT Glucose 133   147   133               Significant Imaging:

## 2025-05-01 NOTE — PLAN OF CARE
A241/A241 EWELINA.  Shirley Metz is a 89 y.o.female admitted on 4/26/2025 for Symptomatic bradycardia   Code Status: Full Code MRN: [id number]   Review of patient's allergies indicates:   Allergen Reactions    Carvedilol Swelling     Lip swelling    Cephalexin Other (See Comments)     Muscle/joint weakness unable to move     Doxycycline Shortness Of Breath, Nausea And Vomiting and Other (See Comments)     weakness    Erythromycin Other (See Comments)     Complete weakness/could not walk    Gadolinium-containing contrast media Swelling     angioedema    Hydrocodone-acetaminophen Shortness Of Breath     wheezing    Labetalol Shortness Of Breath, Nausea Only, Palpitations and Other (See Comments)     weakness    Loratadine Other (See Comments)     Double vision/spots  Other reaction(s): double vision    Losartan Other (See Comments)     weakness    Loteprednol etabonate Other (See Comments), Palpitations, Anxiety and Shortness Of Breath     Patient stated bp went up and extreme muscle weakness    Naproxen      wheezing  wheezing  wheezing  Other reaction(s): wheezing    Prednisone Other (See Comments)     Myalgias and generalized weakness, unable to walk    Statins-hmg-coa reductase inhibitors Other (See Comments)     Severe Muscle weakness  Muscle weakness  Severe Muscle weakness  Other reaction(s): severe muscle weakness    Amlodipine Other (See Comments)     Myalgias    Fenofibrate Other (See Comments)     muscle weakness      Hydralazine Other (See Comments)     muscle tremors    Loteprednol Other (See Comments)     increased BP    Macrolide antibiotics Other (See Comments)     Complete weakness/could not walk      Moxifloxacin Hives and Other (See Comments)     muscle soreness    Timolol Other (See Comments)     Blurred vision, Burning eyes, Severe muscle weakness, eye problems.      Verapamil Diarrhea     Severe diarrhea.      Hydrocortisone     Isosorbide      Tolerates Imdur    Silver sulfadiaz-foam bandage      Tetanus and diphtheria toxoids     Tetanus vaccines and toxoid     Adhesive Rash     Clonidine patch--contact dermatitis    Codeine Diarrhea and Other (See Comments)     Loss of balance       Doxazosin Palpitations    Fexofenadine Other (See Comments)     Double vision/spots       Hydrocortisone (bulk) Other (See Comments)     Only suppository/ caused muscle weakness    Latanoprost Other (See Comments)     Muscle weakness      Olopatadine Other (See Comments)     Muscle weakness       Past Medical History:   Diagnosis Date    Acute on chronic diastolic congestive heart failure 01/11/2023    Anemia 11/29/2018    Anxiety 11/28/2017    Arthritis     Atrial fibrillation with RVR 10/09/2019    Back pain     Basal cell carcinoma 03/29/2018    left mid back    Chronic diastolic congestive heart failure 10/15/2019    CKD (chronic kidney disease) stage 3, GFR 30-59 ml/min 08/08/2016    Colon polyps     reported per patient.     Coronary artery calcification 10/05/2021    Elevated liver enzymes 12/20/2019    Elevated troponin 03/15/2018    Fuchs' corneal dystrophy     General anesthetics causing adverse effect in therapeutic use     woke up during surgery and gagged on ET tube    Glaucoma     Glaucoma     Heart failure with preserved left ventricular function (HFpEF) 07/24/2018    Hyperlipidemia     Hypertension     Macular degeneration dry    Medication side effects 05/03/2017    Obesity     Pre-diabetes 12/04/2017    PVD (peripheral vascular disease) 04/26/2021    PVD (peripheral vascular disease) 04/26/2021    Squamous cell carcinoma 01/08/2019    left shoulder    Stenosis of carotid artery 10/05/2021    Troponin level elevated 01/11/2023    Trouble in sleeping     Type 2 diabetes mellitus without complication, without long-term current use of insulin 02/24/2021    Urinary tract infection       PRN meds    acetaminophen, 650 mg, Q6H PRN  ALPRAZolam, 0.5 mg, Nightly PRN  artificial tears, 1 drop, PRN  bisacodyL, 10 mg,  Daily PRN  dextrose 50%, 12.5 g, PRN  dextrose 50%, 12.5 g, PRN  dextrose 50%, 25 g, PRN  glucagon (human recombinant), 1 mg, PRN  glucagon (human recombinant), 1 mg, PRN  glucose, 16 g, PRN  glucose, 24 g, PRN  insulin aspart U-100, 0-5 Units, Q6H PRN  naloxone, 0.02 mg, PRN  polyethylene glycol, 17 g, Daily PRN  prochlorperazine, 5 mg, Q6H PRN  sodium chloride 0.9%, 10 mL, Q12H PRN      Chart check completed. Will continue plan of care.      Orientation: oriented x 4  [place] Coma Scale Score: 15     Lead Monitored: Lead II Rhythm: normal sinus rhythm Frequency/Ectopy: PVCs  Cardiac/Telemetry Box Number: [id number]  VTE Core Measure: (SCDs) Sequential compression device initiated/maintained Last Bowel Movement: 04/28/25  Diet Heart Healthy Standard Tray  Voiding Characteristics: urethral catheter (bladder)  [place]ingo Score: 14  Fall Risk Score: 14  Accucheck []   Freq?      Lines/Drains/Airways       Peripheral Intravenous Line  Duration                  Peripheral IV - Single Lumen 04/27/25 1315 20 G Distal;Posterior;Right Forearm 3 days

## 2025-05-01 NOTE — PLAN OF CARE
Continue OT POC.  Min A for sup>sit. Max A x2 for STS attempts with HHA.  Pacemaker precautions.  C/o B foot pain L>>R.

## 2025-05-01 NOTE — ASSESSMENT & PLAN NOTE
Hold Eliquis.    No Lovenox or heparin for DVT prophylaxis.    NPO after midnight.    Last dose of Eliquis was Friday evening.    Plan for dual-chamber pacemaker in a.m..    Continue to hold Bystolic.        04/28/2025.    Dual-chamber pacemaker today.    Still have multiple pauses.      4/30/25  S/p PPM  Can resume bystolic upon DC  F/u in arrhythmia clinic  Sling for 24/7 for first 2 days then at night for 4 weeks    May 1, 2025.    She may resume her home Bystolic as inpatient.  Explained to patient that she will have to have her Bystolic given to the nurse for proper administration while in hospital.    She can resume her Eliquis tonight.    Continue SCDs for DVT prophylaxis.  Disposition as per Hospital Medicine.    Finish 5 days of Keflex.

## 2025-05-01 NOTE — CONSULTS
SW met with patient at bedside regarding SNF consult. Pt complaints of foot pain and inability to stand which is not her baseline. Pt reports she's normally able to walk with her rollator and frequently participates in exercise at her intermediate community. Pt preference [facility] SNF [facility] or [facility]. SW discussed sending referrals and will f/u with accepting options.   Referrals pending in Epic  [phone] pending.

## 2025-05-01 NOTE — ASSESSMENT & PLAN NOTE
"Patient has Diastolic (HFpEF) heart failure that is Chronic. On presentation their CHF was well compensated. Most recent BNP and echo results are listed below.  No results for input(s): "BNP" in the last 72 hours.    Latest ECHO  Results for orders placed during the hospital encounter of 07/11/22    Echo    Interpretation Summary  · Concentric remodeling and normal systolic function.  · The estimated ejection fraction is 60%.  · Normal left ventricular diastolic function.  · Normal right ventricular size with normal right ventricular systolic function.    Current Heart Failure Medications       Plan  - Monitor strict I&Os and daily weights.    - Place on telemetry  - Low sodium diet  - Place on fluid restriction of 1.5 L.   - Cardiology has been consulted  - The patient's volume status is at their baseline  Resume bystolic   Resume home lasix on discharge     "

## 2025-05-01 NOTE — PROGRESS NOTES
Formerly [facility] - Telemetry ([facility] Medicine  Progress Note    Patient Name: Shirley Metz  MRN: [id number]  Patient Class: IP- Inpatient   Admission Date: 4/26/2025  Length of Stay: 5 days  Attending Physician: Savannah Nye MD  Primary Care Provider: Phylicia Deleon MD        Subjective     Principal Problem:Symptomatic bradycardia        HPI:  89-year-old  female with history of persistent AFib on Eliquis, chronic diastolic CHF, glaucoma, morbid obesity, hypertension presents from assisted living with complaints of dizziness.  States she was in her normal state of health when she suddenly felt dizzy, lightheaded, blurry vision felt faint.  This was persistent prompting ER visit.  In the ER was noted to have heart rate between 101 10, and bouts of bradycardia into the 30s with brief pauses.  Had intermittent PACs/PVCs.  She states her heart rate typically runs 100- 110 Has had several ablations by Dr. Kelly in the past.  Was on metoprolol but stopped 6 weeks ago due to issues with blood pressure running low.  She has also been on midodrine as needed.  Denies any chest pain, lower extremity edema, nausea, vomiting fevers, chills, cough congestion, diarrhea.  She states her    Labs notable for potassium 3.4, troponin 0.052, chest x-ray without any acute findings.  EKG her, heart rate 100, intermittent PVCs, no ischemic ST changes noted.          Overview/Hospital Course:  4/30 admitted to icu for symptomatic bradycardia. Cardiology consulted and permanent pacemaker placed. Transferred out of icu. Ok of discharge per cardiology but patient now complaining of bilateral feet pain. Denies trauma but thinks may have occurred during ambulance ride to hospital.  5/1 continues to complain of bilateral feet pain, L>>R, impeding mobility. Characterizes as sharp. Does endorse improvement in right foot. Encourage OOB activity. Resume eliquis and keflex per cardiology.     Interval History: See  hospital course for today      Review of Systems   Constitutional:  Positive for activity change. Negative for fever.   Respiratory:  Negative for shortness of breath.    Cardiovascular:  Negative for chest pain.   Gastrointestinal:  Negative for nausea and vomiting.   Musculoskeletal:  Positive for gait problem and myalgias.   Skin:  Positive for wound.   Neurological:  Positive for weakness.   Psychiatric/Behavioral:  The patient is nervous/anxious.      Objective:     Vital Signs (Most Recent):  Temp: 98.1 °F (36.7 °C) (05/01/25 1209)  Pulse: 76 (05/01/25 1529)  Resp: 18 (05/01/25 1209)  BP: (!) 146/65 (05/01/25 1209)  SpO2: (!) 94 % (05/01/25 1209) Vital Signs (24h Range):  Temp:  [97.9 °F (36.6 °C)-98.9 °F (37.2 °C)] 98.1 °F (36.7 °C)  Pulse:  [68-89] 76  Resp:  [18-20] 18  SpO2:  [89 %-94 %] 94 %  BP: (115-146)/(56-82) 146/65     Weight: 85.2 kg (187 lb 13.3 oz)  Body mass index is 32.24 kg/m².    Intake/Output Summary (Last 24 hours) at 5/1/2025 1616  Last data filed at 5/1/2025 1230  Gross per 24 hour   Intake 480 ml   Output --   Net 480 ml         Physical Exam  Vitals and nursing note reviewed. Exam conducted with a chaperone present (son, nursing, charge).   Constitutional:       General: She is not in acute distress.     Appearance: She is obese. She is ill-appearing. She is not toxic-appearing.   HENT:      Head: Normocephalic and atraumatic.   Cardiovascular:      Rate and Rhythm: Normal rate.   Pulmonary:      Effort: Pulmonary effort is normal. No respiratory distress.   Chest:       Abdominal:      Palpations: Abdomen is soft.      Tenderness: There is no abdominal tenderness.   Musculoskeletal:         General: Tenderness present. No deformity.      Right lower leg: No edema.      Left lower leg: No edema.   Skin:     General: Skin is warm.      Coloration: Skin is pale.      Findings: Wound present.   Neurological:      Mental Status: She is alert and oriented to person, place, and time.       "Motor: Weakness present.   Psychiatric:         Mood and Affect: Mood is anxious.         Speech: Speech is tangential.               Significant Labs: All pertinent labs within the past 24 hours have been reviewed.  CBC:   Recent Labs   Lab 04/30/25  0629   WBC 11.40   HGB 10.8*   HCT 33.9*        CMP:   Recent Labs   Lab 04/30/25  0629      K 4.0   CL 99   CO2 26   *   BUN 58*   CREATININE 1.9*   CALCIUM 9.2   ANIONGAP 12     POCT Glucose:   Recent Labs   Lab 04/30/25  2223 05/01/25  0613 05/01/25  1221   POCTGLUCOSE 147* 133* 176*       Significant Imaging: I have reviewed all pertinent imaging results/findings within the past 24 hours.      Assessment & Plan  Symptomatic bradycardia  -admit to inpatient  -monitor on tele  -hold darryl blocking agents, hold Eliquis, check 2D echo  -cardiology consulted recs appreciated    Status post ppm   Resume bystolic   Eliquis restarted   Chronic diastolic CHF (congestive heart failure)  Patient has Diastolic (HFpEF) heart failure that is Chronic. On presentation their CHF was well compensated. Most recent BNP and echo results are listed below.  No results for input(s): "BNP" in the last 72 hours.    Latest ECHO  Results for orders placed during the hospital encounter of 07/11/22    Echo    Interpretation Summary  · Concentric remodeling and normal systolic function.  · The estimated ejection fraction is 60%.  · Normal left ventricular diastolic function.  · Normal right ventricular size with normal right ventricular systolic function.    Current Heart Failure Medications       Plan  - Monitor strict I&Os and daily weights.    - Place on telemetry  - Low sodium diet  - Place on fluid restriction of 1.5 L.   - Cardiology has been consulted  - The patient's volume status is at their baseline  Resume bystolic   Resume home lasix on discharge     Persistent atrial fibrillation  Patient has persistent (7 days or more) atrial fibrillation. Patient is currently " "in sinus rhythm. [id number] Score: 5. The patients heart rate in the last 24 hours is as follows:  Pulse  Min: 68  Max: 89     Antiarrhythmics       Anticoagulants  apixaban tablet 2.5 mg, 2 times daily, Oral    Plan  - holding Eliquis for possible pacemaker placement  -holding all darryl blocking agents due to bradycardia        HTN (hypertension)  Patient's blood pressure range in the last 24 hours was: BP  Min: 115/56  Max: 146/65.The patient's inpatient anti-hypertensive regimen is listed below:  Current Antihypertensives       Plan  - resume lasix on discharge   Resume home bystolic  COAG (chronic open-angle glaucoma) - Both Eyes  -resume home eye drops.  Patient brought her own.    Obesity (BMI 30.0-34.9)  Physical/occupational therapy recommending skilled nursing facility   Needs max encouragement  Type 2 diabetes mellitus with chronic kidney disease, without long-term current use of insulin  -diet controlled.  Monitor daily glucose.  Tachy-ed syndrome      Sick sinus syndrome      Chronic diastolic congestive heart failure  Patient has Diastolic (HFpEF) heart failure that is Chronic. On presentation their CHF was well compensated. Most recent BNP and echo results are listed below.  No results for input(s): "BNP" in the last 72 hours.  Latest ECHO  Results for orders placed during the hospital encounter of 04/26/25    Echo    Interpretation Summary    Left Ventricle: The left ventricle is normal in size. Normal wall thickness. There is normal systolic function with a visually estimated ejection fraction of 60 - 65%. Grade III diastolic dysfunction.    Right Ventricle: The right ventricle is normal in size. Wall thickness is normal. Systolic function is normal.    Left Atrium: Severely dilated    Mitral Valve: There is mild regurgitation.    Tricuspid Valve: There is mild to moderate regurgitation.    Pulmonary Artery: The estimated pulmonary artery systolic pressure is 54 mmHg.    IVC/SVC: Intermediate " venous pressure at 8 mmHg.    Current Heart Failure Medications       Plan  - Monitor strict I&Os and daily weights.    - Place on telemetry  - Low sodium diet  - Place on fluid restriction of 1.5 L.   - Cardiology has been consulted  - The patient's volume status is at their baseline  - lasix prn      Pain in both feet  Denies falling but may have occurred during ambulance ride  Admits to improvement in right foot  Exam reassuring  Multiple allergies  Follow up outpatient primary care provider   Physical/occupational therapy recommending skilled nursing facility  Needs max encouragement      VTE Risk Mitigation (From admission, onward)           Ordered     apixaban tablet 2.5 mg  2 times daily         05/01/25 1541     IP VTE HIGH RISK PATIENT  Once         04/26/25 1621     Place sequential compression device  Until discontinued         04/26/25 1621                    Discharge Planning   SHAYNA: 5/1/2025     Code Status: Full Code   Medical Readiness for Discharge Date:   Discharge Plan A: Skilled Nursing Facility   Discharge Delays: None known at this time                  Savannah Nye MD  Department of Hospital Medicine   [facility] - Telemetry ([facility])

## 2025-05-02 VITALS
WEIGHT: 187.81 LBS | BODY MASS INDEX: 32.06 KG/M2 | DIASTOLIC BLOOD PRESSURE: 68 MMHG | TEMPERATURE: 98 F | OXYGEN SATURATION: 94 % | HEART RATE: 79 BPM | SYSTOLIC BLOOD PRESSURE: 132 MMHG | HEIGHT: 64 IN | RESPIRATION RATE: 20 BRPM

## 2025-05-02 PROBLEM — R00.1 SYMPTOMATIC BRADYCARDIA: Status: RESOLVED | Noted: 2020-02-02 | Resolved: 2025-05-02

## 2025-05-02 PROBLEM — E79.0 HYPERURICEMIA: Status: ACTIVE | Noted: 2025-05-02

## 2025-05-02 LAB
POCT GLUCOSE: 128 MG/DL (ref 70–110)
POCT GLUCOSE: 132 MG/DL (ref 70–110)
URATE SERPL-MCNC: 11.4 MG/DL (ref 2.4–5.7)

## 2025-05-02 PROCEDURE — 97530 THERAPEUTIC ACTIVITIES: CPT | Mod: HCNC

## 2025-05-02 PROCEDURE — 25000003 PHARM REV CODE 250: Mod: HCNC | Performed by: INTERNAL MEDICINE

## 2025-05-02 PROCEDURE — 25000003 PHARM REV CODE 250: Mod: HCNC | Performed by: FAMILY MEDICINE

## 2025-05-02 PROCEDURE — 36415 COLL VENOUS BLD VENIPUNCTURE: CPT | Mod: HCNC | Performed by: INTERNAL MEDICINE

## 2025-05-02 PROCEDURE — 84550 ASSAY OF BLOOD/URIC ACID: CPT | Mod: HCNC | Performed by: INTERNAL MEDICINE

## 2025-05-02 PROCEDURE — 25000003 PHARM REV CODE 250: Mod: HCNC | Performed by: NURSE PRACTITIONER

## 2025-05-02 PROCEDURE — 25000003 PHARM REV CODE 250: Mod: HCNC | Performed by: STUDENT IN AN ORGANIZED HEALTH CARE EDUCATION/TRAINING PROGRAM

## 2025-05-02 RX ORDER — LIDOCAINE 50 MG/G
1 PATCH TOPICAL DAILY
Start: 2025-05-02

## 2025-05-02 RX ORDER — FAMOTIDINE 20 MG/1
20 TABLET, FILM COATED ORAL EVERY OTHER DAY
Start: 2025-05-03 | End: 2026-05-03

## 2025-05-02 RX ORDER — COLCHICINE 0.6 MG/1
0.6 TABLET ORAL DAILY
Start: 2025-05-03 | End: 2025-05-13

## 2025-05-02 RX ORDER — ALPRAZOLAM 0.5 MG/1
0.5 TABLET ORAL NIGHTLY PRN
Qty: 4 TABLET | Refills: 0 | Status: SHIPPED | OUTPATIENT
Start: 2025-05-02

## 2025-05-02 RX ORDER — CEPHALEXIN 500 MG/1
500 CAPSULE ORAL EVERY 8 HOURS
Start: 2025-05-02 | End: 2025-05-23

## 2025-05-02 RX ADMIN — ACETAMINOPHEN 650 MG: 325 TABLET ORAL at 09:05

## 2025-05-02 RX ADMIN — APIXABAN 2.5 MG: 2.5 TABLET, FILM COATED ORAL at 09:05

## 2025-05-02 RX ADMIN — CEPHALEXIN 500 MG: 500 CAPSULE ORAL at 05:05

## 2025-05-02 RX ADMIN — POLYETHYLENE GLYCOL 3350 17 G: 17 POWDER, FOR SOLUTION ORAL at 05:05

## 2025-05-02 RX ADMIN — CEPHALEXIN 500 MG: 500 CAPSULE ORAL at 02:05

## 2025-05-02 RX ADMIN — COLCHICINE 0.3 MG: 0.6 TABLET, FILM COATED ORAL at 12:05

## 2025-05-02 RX ADMIN — NEBIVOLOL HYDROCHLORIDE 5 MG: 5 TABLET ORAL at 09:05

## 2025-05-02 NOTE — PLAN OF CARE
Pt refused all OOB/EOB activity and supine TherEx, despite max encouragement from therapist, due to fatigue. Educated on the importance of OOB/EOB activity for her recovery. Educated on importance of consistent participation with PT. Educated on importance of TherEx to maintain/regain strength, encouraged to complete supine TherEx (hip flex, hip abd/add, heel slides, quad sets, ankle pumps) throughout the day. Encouraged frequent position changes to reduce the risk of pressure injury. Encouraged to sit up for all meals. Recommending moderate intensity therapy upon d/c.

## 2025-05-02 NOTE — PLAN OF CARE
Problem: Adult Inpatient Plan of Care  Goal: Plan of Care Review  Outcome: Met  Goal: Patient-Specific Goal (Individualized)  Outcome: Met  Goal: Absence of Hospital-Acquired Illness or Injury  Outcome: Met  Goal: Optimal Comfort and Wellbeing  Outcome: Met  Goal: Readiness for Transition of Care  Outcome: Met     Problem: Diabetes Comorbidity  Goal: Blood Glucose Level Within Targeted Range  Outcome: Met     Problem: Fall Injury Risk  Goal: Absence of Fall and Fall-Related Injury  Outcome: Met     Problem: Infection  Goal: Absence of Infection Signs and Symptoms  Outcome: Met     Problem: Skin Injury Risk Increased  Goal: Skin Health and Integrity  Outcome: Met     Problem: Wound  Goal: Optimal Coping  Outcome: Met  Goal: Optimal Functional Ability  Outcome: Met  Goal: Absence of Infection Signs and Symptoms  Outcome: Met  Goal: Improved Oral Intake  Outcome: Met  Goal: Optimal Pain Control and Function  Outcome: Met  Goal: Skin Health and Integrity  Outcome: Met  Goal: Optimal Wound Healing  Outcome: Met

## 2025-05-02 NOTE — PT/OT/SLP PROGRESS
"Occupational Therapy   Treatment    Name: Shirley Metz  MRN: [id number]  Admitting Diagnosis:  Symptomatic bradycardia  3 Days Post-Op    Recommendations:     Discharge Recommendations: Moderate Intensity Therapy  Discharge Equipment Recommendations:  to be determined by next level of care  Barriers to discharge:  Decreased caregiver support    Assessment:     Shirley Metz is a 89 y.o. female with a medical diagnosis of Symptomatic bradycardia.  She presents with the following performance deficits affecting function are weakness, impaired endurance, impaired self care skills, impaired functional mobility, gait instability, impaired balance, decreased safety awareness, decreased coordination, orthopedic precautions, decreased ROM, pain.     Rehab Prognosis:  Poor; patient would benefit from acute skilled OT services to address these deficits and reach maximum level of function.       Plan:     Patient to be seen 2 x/week to address the above listed problems via self-care/home management, therapeutic activities, therapeutic exercises  Plan of Care Expires: 05/14/25  Plan of Care Reviewed with: patient    Subjective     Chief Complaint: "My feet hurt too much to do it"  Patient/Family Comments/goals: Go to skilled today  Pain/Comfort:  Pain Rating 1: 7/10  Location - Side 1: Bilateral  Location - Orientation 1: generalized  Location 1: foot  Pain Rating Post-Intervention 1: 7/10  Location - Orientation 2: lower  Location 2: back  Pain Rating Post-Intervention 2: 7/10    Objective:     Communicated with: Nurse and EPIC chart review prior to session.  Patient found with bed in chair position with peripheral IV, telemetry upon OT entry to room.    General Precautions: Standard, fall    Orthopedic Precautions:LUE non weight bearing  Braces: Sling and swathe  Respiratory Status: Room air     Occupational Performance:     Bed Mobility:    Pt denied    Functional Mobility/Transfers:  Pt " denied    Activities of Daily Living:  Pt denied      AMPAC 6 Click ADL: 15    Treatment & Education:  Pt refused participation in BM and FM d/t feet and L back pain. Pt encouraged to participate to improve to return to independent living. Pt refused and said she had to stay in bed. Bed educated on importance of participation in therapy and benefits.  Pt educated on completing the following Therex 1x10 in order to increase BM and FM:   Shoulder flexion   Elbow Flexion   Hand squeezes  OT role, plan of care, progression of goals, importance of continued OOB activity, ADL/functional transfer and mobility retraining, call don't fall, safety precautions, fall prevention. Pt educated on sitting in chair for 1 hour during breakfast lunch and dinner in order to change positions, regulate BP and reduce bed sores.   Pt acknowledges and agrees with POC given by OT.      Patient left with bed in chair position with all lines intact, call button in reach, and bed alarm on    GOALS:   Multidisciplinary Problems       Occupational Therapy Goals          Problem: Occupational Therapy    Goal Priority Disciplines Outcome Interventions   Occupational Therapy Goal     OT, PT/OT Not Progressing    Description: O.T. GOALS TO BE MET 5-14-20  PT WILL REQ MOD A WITH BSC TRANSFER  MOD A WITH LE DRESSING  MOD A WITH UE DRESSING  MOD A WITH TOILETING                           Time Tracking:     OT Date of Treatment: 05/02/25  OT Start Time: 1020  OT Stop Time: 1030  OT Total Time (min): 10 min    Billable Minutes:Therapeutic Activity 10    OT/ODETTE: ODETTE     Number of ODETTE visits since last OT visit: 1  A client care conference was performed between the LOTR and ANKIT, prior to treatment by PHAM, to discuss the patient's status, treatment plan and established goals.  ANKIT Lugo      5/2/2025

## 2025-05-02 NOTE — PROGRESS NOTES
Pharmacist Renal Dose Adjustment Note    Shirley Metz is a 89 y.o. female being treated with the medication colchicine.     Patient Data:    Vital Signs (Most Recent):  Temp: 97.9 °F (36.6 °C) (05/02/25 0750)  Pulse: 83 (05/02/25 0933)  Resp: 19 (05/02/25 0750)  BP: 129/60 (05/02/25 0933)  SpO2: (!) 92 % (05/02/25 0750) Vital Signs (72h Range):  Temp:  [97.8 °F (36.6 °C)-98.9 °F (37.2 °C)]   Pulse:  [65-89]   Resp:  [17-39]   BP: ()/(50-82)   SpO2:  [88 %-100 %]      Recent Labs   Lab 04/28/25  0300 04/29/25  0513 04/30/25  0629   CREATININE 1.9* 2.0* 1.9*     Serum creatinine: 1.9 mg/dL (H) 04/30/25 0629  Estimated creatinine clearance: 20 mL/min (A)    Colchicine 0.6 mg oral daily will be changed to colchicine 0.3 mg oral daily per renal dose protocol for CrCl < 30 ml/min.     Thank you,  Pharmacist's Name: Ric Kaufman

## 2025-05-02 NOTE — DISCHARGE INSTRUCTIONS
POST OP PACEMAKER DISCHARGE INSTRUCTIONS       DISCOMFORT: FOR GENERAL DISCOMFORT AT THE PUNCTURE, YOU MAY TAKE TYLENOL, 1-2 TABS EVERY 4-6 HOURS AS NEEDED.  DO NOT TAKE MORE THAN 4000 MG  IN 24 HOUR PERIOD.    ACTIVITY/ WOUND INSTRUCTIONS     AFFECTED ARM  DO NOT LIFT ARM ABOVE SHOULDER HEIGHT FOR 7 DAYS.                                  DO NOT  SWING ARM FOR 6 WEEKS                                DO NOT REMOVE DRESSING.  IT WILL BE REMOVED AT FOLLOW UP APPOINTMENT                                YOU MAY SHOWER IN 48 HOURS.                                  DO NOT REMOVE THE DRESSING FOR SHOWER.                                 USE HIBICLENS SOAP ON CHEST AND NECK AREA  FOR 1 WEEK.                                 (DO NOT WASH ABOVE CHIN OR BELOW ABDOMEN WITH HIBICLENS SOAP!)                                 FACE AWAY FROM SPRAY WHEN SHOWERING                                                                               REMOVE SLING FOR SHOWER, THEN REAPPLY AFTER SHOWER.                                USE ICE PACK FOR 48 HOURS .                                WEAR SLING AND SWATHE FOR 72 HOURS AROUND THE CLOCK THEN ONLY WHILE SLEEPING AT NIGHT FOR 1 MONTH             6. CALL YOUR HEALTHCARE PROVIDER IF YOU START TO HAVE THE FOLLOWING SYMPTOMS:                   1. High fever (101 degrees or higher)                 2. Redness,drainage or swelling  or intense pain at the incision site       3.  Drowsiness that doesn't get better       4. Weakness or dizziness that doesn't get better            5. Repeated vomiting                                                                                                                 NOZIN INSTRUCTIONS     GOAL: TO REDUCE THE RISK OF POST-PROCEDURAL INFECTIONS BY BACTERIA IN THE NASAL CAVITY.     Thank you for coming in today. It was a pleasure to see you. We want to give the best care possible. If you are notified to take a patient experience survey, please take the time to answer the questions. Your feedback is important to us and helps us know how we are doing. Our team appreciates it. Thank you!    IF YOU NEED ASSISTANCE IN BETWEEN NOW AND YOUR NEXT APPOINTMENT:  -Please call my office at [phone]  -Our neurology team will take your call and route a message to myself and my medical assistant or nurse  -You can also utilize the [facility] ruben as another form of communication.  -The Neurology clinic is open Monday-Friday 8-4:30 pm.    Results: If you recently had testing performed and your results are normal, we will notify you of your results in a letter. If we need to contact you regarding any abnormal results, we will make 3 attempts to reach you at the number you have listed during your office visit today. If we are unable to reach you, a letter with your results and any further instructions will be mailed to your home.    Refills: If you need a refill of your medication, please contact your pharmacy FIRST. You may have refills on file with them; if not, they will contact our office to refill the prescription on your behalf. You must have a follow appointment scheduled to have a refill sent to your pharmacy. May take 48 to 72 hours to refill a medication so please allow enough time before you run out of medication.     [place] [facility] Hours: Lab in [place] is walk in only no appointments.  Monday - Thursday: 7:00 am - 6:00 pm  Friday: 7:00 am - 5:00 pm  Saturday: 7:00 am - steven Reid for the labs call - [phone] to set up a lab appointment.    [facility] call- [phone] to set up a lab appointment.     If Diagnostic testing was ordered contact [phone] to set up appt.    If Cardiac testing was ordered contact [phone]    If cpap supplies were ordered call Megan hernandez  THINK OF IT AS A HAND  FOR YOUR NOSE.     HOW TO USE:  1. SHAKE NOZIN BOTTLE WELL                            2. TAKE A COTTON SWAB AND APPLY FOUR DROPS TO TIP.                            3. INSERT COTTON SWAB INTO ONE NOSTRIL, BEING SURE NOT TO GO DEEPER INTO NOSE THAN THE TIP OF THE SWAB.                            4.SWAB NOSTRIL 6 TIMES CLOCKWISE AND 6 TIMES COUNERCLOCKWISE.  MAKE SURE TO SWAB THE INSIDE FRONT POCKET OF NOSTRIL.                             5. TAKE SWAB OUT AND APPLY 2 DROPS TO THE SAME COTTON TIP.  REPEAT STEPS 3 AND 4 IN THE OTHER NOSTRIL      DO STEPS 1-5 TWICE A DAY FOR 7 DAYS.                               home @ [phone] to get set up.    If referred for Neuropsych testing with [facility] please call [phone]

## 2025-05-02 NOTE — PT/OT/SLP PROGRESS
"Physical Therapy Treatment    Patient Name:  Shirley Metz   MRN:  [id number]    Recommendations:     Discharge Recommendations: Moderate Intensity Therapy  Discharge Equipment Recommendations: to be determined by next level of care  Barriers to discharge: Decreased caregiver support    Assessment:     Shirley Metz is a 89 y.o. female admitted with a medical diagnosis of Symptomatic bradycardia.  She presents with the following impairments/functional limitations: weakness, impaired endurance, impaired functional mobility, gait instability, impaired balance, pain, decreased safety awareness, decreased lower extremity function, decreased ROM, decreased coordination.    Rehab Prognosis: Fair; patient would benefit from acute skilled PT services to address these deficits and reach maximum level of function.    Recent Surgery: Procedure(s) (LRB):  INSERTION, ELECTRODE LEAD, PACEMAKER, 1 ELECTRODE LEAD (N/A) 3 Days Post-Op    Plan:     During this hospitalization, patient to be seen 3 x/week to address the identified rehab impairments via gait training, therapeutic activities, therapeutic exercises and progress toward the following goals:    Plan of Care Expires:  05/14/25    Subjective     Chief Complaint: Pt refused all PT intervention, reports "I'm exhausted. I don't know what is wrong with my feet"  Patient/Family Comments/goals: none stated  Pain/Comfort:  Pain Rating 1: 7/10  Location - Side 1: Bilateral  Location - Orientation 1: generalized  Location 1: foot (and "everywhere")  Pain Addressed 1: Reposition, Distraction  Pain Rating Post-Intervention 1: 7/10      Objective:     Communicated with epic chart review prior to session.  Patient found HOB elevated with peripheral IV, telemetry upon PT entry to room.     General Precautions: Standard, fall, pacemaker  Orthopedic Precautions: LUE non weight bearing  Braces: Sling and swathe (Per MD, OK to remove sling during the day. Wear sling at night " "for the next 4 weeks.)  Respiratory Status: Room air     Functional Mobility:  Pt refused all mobility despite efforts and education.       AM-PAC 6 CLICK MOBILITY  Turning over in bed (including adjusting bedclothes, sheets and blankets)?: 1 (pt refused all mobility)  Sitting down on and standing up from a chair with arms (e.g., wheelchair, bedside commode, etc.): 1  Moving from lying on back to sitting on the side of the bed?: 1  Moving to and from a bed to a chair (including a wheelchair)?: 1  Need to walk in hospital room?: 1  Climbing 3-5 steps with a railing?: 1  Basic Mobility Total Score: 6       Treatment & Education:  Reviewed role of PT in acute care and POC. Pt refused all OOB/EOB activity and supine TherEx, despite max encouragement from therapist, due to fatigue. Educated on the importance of OOB/EOB activity for her recovery. Educated on importance of consistent participation with PT. Educated on importance of TherEx to maintain/regain strength, encouraged to complete supine TherEx (hip flex, hip abd/add, heel slides, quad sets, ankle pumps) throughout the day. Encouraged frequent position changes to reduce the risk of pressure injury. Encouraged to sit up in for all meals. Reviewed "call don't fall" policy and increased risk of falling due to weakness, instructed to utilize call bell for assistance with all transfers. Pt agreeable to all requests.    Patient left HOB elevated with all lines intact, call button in reach, and bed alarm on..    GOALS:   Multidisciplinary Problems       Physical Therapy Goals          Problem: Physical Therapy    Goal Priority Disciplines Outcome Interventions   Physical Therapy Goal     PT, PT/OT     Description: Goals to be met by 5/14/25.  1. Pt will complete bed mobility SBA.  2. Pt will complete sit to stand SBA.  3. Pt will ambulate 100ft CGA using LRAD.  4. Pt will increase AMPAC score by 2 points to progress functional mobility.                       DME " Justifications:  No DME recommended requiring DME justifications    Time Tracking:     PT Received On: 05/02/25  PT Start Time: 1020     PT Stop Time: 1030  PT Total Time (min): 10 min     Billable Minutes: Therapeutic Activity 10min    Treatment Type: Treatment  PT/PTA: PT     Number of PTA visits since last PT visit: 0     05/02/2025

## 2025-05-02 NOTE — PLAN OF CARE
[facility] - Telemetry (Hospital)  Discharge Final Note    Primary Care Provider: Phylicia Deleon MD    Expected Discharge Date: 5/2/2025    Final Discharge Note (most recent)       Final Note - 05/02/25 1521          Final Note    Assessment Type Final Discharge Note     Anticipated Discharge Disposition Skilled Nursing Facility     Hospital Resources/Appts/Education Provided Appointments scheduled and added to AVS;Post-Acute resouces added to AVS        Post-Acute Status    Post-Acute Authorization Placement     Post-Acute Placement Status Set-up Complete/Auth obtained     Discharge Delays None known at this time                   Pt to discharge to [facility]. Discharge clinicals provided via SNF. Updated AVS provided via Epic along with hard script and 142/pasrr.   Number for report provided to nursing staff and ambulance transportation arranged by SNF; ETA pending.     Important Message from Medicare              Follow-up providers       Phylicia Deleon MD   Specialty: Internal Medicine   Relationship: PCP - General  Physician    [address] [place] [address]   Phone: [phone]       Next Steps: Schedule an appointment as soon as possible for a visit    Instructions: upon dc from skilled nursing call and make and appointment              After-discharge care                Destination       *[facility]   Service: Skilled Nursing    [address]  [place] [address]   Phone: [phone]

## 2025-05-02 NOTE — PLAN OF CARE
Pt stated she was in too much pain to participate in BM and FM. Pt stated she is not receiving proper care from staff and is can not walk.    Pt educated on importance of participation and performing therapeutic task in order to return to independent as she was before hospitalization.

## 2025-05-02 NOTE — NURSING
Pt report called to [facility] to the nurse Pam. No further question. EMS set up by facility, awaiting for pickup.

## 2025-05-03 NOTE — ASSESSMENT & PLAN NOTE
Patient has persistent (7 days or more) atrial fibrillation. Patient is currently in sinus rhythm. [id number] Score: 5.   Resume Eliquis after pacemaker placement

## 2025-05-03 NOTE — ASSESSMENT & PLAN NOTE
Denies falling but may have occurred during ambulance ride  Admits to improvement in right foot  Exam reassuring  Multiple allergies  Follow up outpatient primary care provider   Physical/occupational therapy recommending skilled nursing facility  Needs max encouragement  Markedly elevated uric acid, improved with colchicine we will DC with colchicine at skilled nursing and we will need to be evaluated for allopurinol.

## 2025-05-03 NOTE — DISCHARGE SUMMARY
[facility] - Telemetry ([facility])  [facility] Medicine  Discharge Summary      Patient Name: Shirley Metz  MRN: [id number]  MARGE: [id number]  Patient Class: IP- Inpatient  Admission Date: 4/26/2025  Hospital Length of Stay: 6 days  Discharge Date and Time: 5/2/2025  5:24 PM  Attending Physician: No att. providers found   Discharging Provider: Valorie Lira MD  Primary Care Provider: Phylicia Deleon MD    Primary Care Team: Networked reference to record PCT     HPI:   89-year-old  female with history of persistent AFib on Eliquis, chronic diastolic CHF, glaucoma, morbid obesity, hypertension presents from assisted living with complaints of dizziness.  States she was in her normal state of health when she suddenly felt dizzy, lightheaded, blurry vision felt faint.  This was persistent prompting ER visit.  In the ER was noted to have heart rate between 101 10, and bouts of bradycardia into the 30s with brief pauses.  Had intermittent PACs/PVCs.  She states her heart rate typically runs 100- 110 Has had several ablations by Dr. Kelly in the past.  Was on metoprolol but stopped 6 weeks ago due to issues with blood pressure running low.  She has also been on midodrine as needed.  Denies any chest pain, lower extremity edema, nausea, vomiting fevers, chills, cough congestion, diarrhea.  She states her    Labs notable for potassium 3.4, troponin 0.052, chest x-ray without any acute findings.  EKG her, heart rate 100, intermittent PVCs, no ischemic ST changes noted.          Procedure(s) (LRB):  INSERTION, ELECTRODE LEAD, PACEMAKER, 1 ELECTRODE LEAD (N/A)      Hospital Course:   4/30 admitted to icu for symptomatic bradycardia. Cardiology consulted and permanent pacemaker placed. Transferred out of icu. Ok of discharge per cardiology but patient now complaining of bilateral feet pain. Denies trauma but thinks may have occurred during ambulance ride to hospital.  5/1 continues to complain of bilateral  feet pain, L>>R, impeding mobility. Characterizes as sharp. Does endorse improvement in right foot. Encourage OOB activity. Resume eliquis and keflex per cardiology.   Uric acid was noted to be markedly elevated, she was started on colchicine with improvement in her lower extremity pain.  Patient was accepted at skilled nursing insurance authorization was obtained and patient is stable to discharge.  Patient seen and examined on day of discharge stable for discharge to skilled nursing.     Goals of Care Treatment Preferences:  Code Status: Full Code    Health care agent: Boris Bettencourt  [facility] care agent number: [phone]    Living Will: Yes     What is most important right now is to focus on avoiding the hospital, symptom/pain control, improvement in condition but with limits to invasive therapies.  Accordingly, we have decided that the best plan to meet the patient's goals includes continuing with treatment.      SDOH Screening:  The patient was screened for utility difficulties, food insecurity, transport difficulties, housing insecurity, and interpersonal safety and there were no concerns identified this admission.     Consults:   Consults (From admission, onward)          Status Ordering Provider     Inpatient consult to Social Work/Case Management  Once        Provider:  (Not yet assigned)    Completed LAURA DENNISON     Inpatient consult to Critical Care Medicine  Once        Provider:  (Not yet assigned)    Completed KORIN KHAN     Inpatient consult to Cardiology  Once        Provider:  Tran Nance MD    Completed KORIN KHAN            Assessment & Plan  Chronic diastolic CHF (congestive heart failure)  Patient has Diastolic (HFpEF) heart failure that is Chronic. On presentation their CHF was well compensated.   Latest ECHO  Results for orders placed during the hospital encounter of 07/11/22    Echo    Interpretation Summary  · Concentric remodeling and normal systolic function.  · The  estimated ejection fraction is 60%.  · Normal left ventricular diastolic function.  · Normal right ventricular size with normal right ventricular systolic function.      Resume bystolic   Resume home lasix on discharge     Persistent atrial fibrillation  Patient has persistent (7 days or more) atrial fibrillation. Patient is currently in sinus rhythm. [id number] Score: 5.   Resume Eliquis after pacemaker placement    HTN (hypertension)  - resume lasix on discharge   Resume home bystolic  COAG (chronic open-angle glaucoma) - Both Eyes  -resume home eye drops.  Patient brought her own.    Obesity (BMI 30.0-34.9)  Physical/occupational therapy recommending skilled nursing facility   Needs max encouragement  Type 2 diabetes mellitus with chronic kidney disease, without long-term current use of insulin  -diet controlled.  Monitor daily glucose.  Tachy-ed syndrome  Sick sinus syndrome          Chronic diastolic congestive heart failure  Patient has Diastolic (HFpEF) heart failure that is Chronic. On presentation their CHF was well compensated. MLatest ECHO  Results for orders placed during the hospital encounter of 04/26/25    Echo    Interpretation Summary    Left Ventricle: The left ventricle is normal in size. Normal wall thickness. There is normal systolic function with a visually estimated ejection fraction of 60 - 65%. Grade III diastolic dysfunction.    Right Ventricle: The right ventricle is normal in size. Wall thickness is normal. Systolic function is normal.    Left Atrium: Severely dilated    Mitral Valve: There is mild regurgitation.    Tricuspid Valve: There is mild to moderate regurgitation.    Pulmonary Artery: The estimated pulmonary artery systolic pressure is 54 mmHg.    IVC/SVC: Intermediate venous pressure at 8 mmHg.        Pain in both feet  Denies falling but may have occurred during ambulance ride  Admits to improvement in right foot  Exam reassuring  Multiple allergies  Follow up outpatient  primary care provider   Physical/occupational therapy recommending skilled nursing facility  Needs max encouragement  Markedly elevated uric acid, improved with colchicine we will DC with colchicine at skilled nursing and we will need to be evaluated for allopurinol.    Hyperuricemia      Final Active Diagnoses:    Diagnosis Date Noted POA    Hyperuricemia [E79.0] 05/02/2025 No    Pain in both feet [M79.671, M79.672] 04/30/2025 Yes    Tachy-ed syndrome [I49.5] 04/27/2025 Yes    Sick sinus syndrome [I49.5] 04/27/2025 Yes    Chronic diastolic congestive heart failure [I50.32] 04/27/2025 Yes    Type 2 diabetes mellitus with chronic kidney disease, without long-term current use of insulin [E11.22] 02/24/2021 Yes     Chronic    Persistent atrial fibrillation [I48.19] 12/06/2019 Yes     Chronic    Chronic diastolic CHF (congestive heart failure) [I50.32] 10/15/2019 Yes    Obesity (BMI 30.0-34.9) [E66.811] 12/04/2017 Yes    COAG (chronic open-angle glaucoma) - Both Eyes [H40.1190] 06/26/2013 Yes     Chronic    HTN (hypertension) [I10] 06/10/2013 Yes     Chronic      Problems Resolved During this Admission:    Diagnosis Date Noted Date Resolved POA    PRINCIPAL PROBLEM:  Symptomatic bradycardia [R00.1] 02/02/2020 05/02/2025 Yes       Discharged Condition: fair    Disposition: Skilled Nursing Facility    Follow Up:   Contact information for follow-up providers       Phylicia Deleon MD. Schedule an appointment as soon as possible for a visit.    Specialty: Internal Medicine  Why: upon dc from skilled nursing call and make and appointment  Contact information:  [address] [address] [phone]                       Contact information for after-discharge care       Destination       [facility] .    Service: Skilled Nursing  Contact information:  [address] [address] [phone]                                 Patient Instructions:       ACCEPT - Ambulatory referral/consult to [facility]   Standing Status: Future   Referral Priority: Routine Referral Type: Consultation   Referral Reason: Specialty Services Required   Requested Specialty: Cardiology   Number of Visits Requested: 1     Diet diabetic     Notify your health care provider if you experience any of the following:  temperature >100.4     Notify your health care provider if you experience any of the following:  severe uncontrolled pain     Notify your health care provider if you experience any of the following:  redness, tenderness, or signs of infection (pain, swelling, redness, odor or green/yellow discharge around incision site)     Activity as tolerated   Order Comments: Ot/pt eval and treat  Pacemaker precautions       Significant Diagnostic Studies: Labs: All labs within the past 24 hours have been reviewed  Imaging Results              X-Ray Chest AP Portable (Final result)  Result time 04/26/25 14:26:41      Final result by Clemente Sarabia MD (04/26/25 14:26:41)                   Impression:      1.  Negative for acute process involving the chest.  2.  Incidental findings as noted above.    Finalized on: 4/26/2025 2:26 PM By:  Clemente Sarabia MD  [facility]# [id number]      2025-04-26 14:28:50.524     [facility]               Narrative:    EXAM:  XR CHEST AP PORTABLE    CLINICAL INDICATION:  Sepsis    FINDINGS:  Frontal views of the chest were obtained.    Comparisons are made to 08/04/2023. Multiple life saving devices overlie the chest. The lungs are free of alveolar opacities. The cardiac silhouette size is enlarged. The trachea is midline and the mediastinal width is normal. Negative for focal infiltrate, effusion or pneumothorax. Pulmonary vasculature is normal. Negative for osseous abnormalities.  Convex left curvature of the thoracic spine.  There are atherosclerotic calcifications of the aortic knob and tortuosity of the aorta.  There are degenerative changes  of the spine.  Degenerative changes of the right shoulder girdle.  Post operative changes involve the left shoulder girdle.                                        Pending Diagnostic Studies:       None           Medications:  Reconciled Home Medications:      Medication List        START taking these medications      artificial tears 0.5 % ophthalmic solution  Commonly known as: ISOPTO TEARS  Place 1 drop into both eyes as needed.     cephALEXin 500 MG capsule  Commonly known as: KEFLEX  Take 1 capsule (500 mg total) by mouth every 8 (eight) hours. for 5 doses     colchicine 0.6 mg tablet  Commonly known as: COLCRYS  Take 1 tablet (0.6 mg total) by mouth once daily.     famotidine 20 MG tablet  Commonly known as: PEPCID  Take 1 tablet (20 mg total) by mouth every other day.     LIDOcaine 5 %  Commonly known as: LIDODERM  Place 1 patch onto the skin once daily. Remove & Discard patch within 12 hours or as directed by MD            CONTINUE taking these medications      acetaminophen 500 MG tablet  Commonly known as: TYLENOL  Take 500 mg by mouth nightly as needed.     ALPRAZolam 0.5 MG tablet  Commonly known as: XANAX  Take 1 tablet (0.5 mg total) by mouth nightly as needed for Anxiety.     b complex vitamins capsule  Take 1 capsule by mouth once daily.     ELIQUIS 2.5 mg Tab  Generic drug: apixaban  TAKE 1 TABLET (2.5 MG TOTAL) BY MOUTH 2 (TWO) TIMES DAILY.     furosemide 40 MG tablet  Commonly known as: LASIX  Take 1 tab in am and 1/2 tab in evening     midodrine 2.5 MG Tab  Commonly known as: PROAMATINE  Take 1 tablet (2.5 mg total) by mouth 3 (three) times daily.     nebivoloL 5 MG Tab  Commonly known as: BYSTOLIC  Take 1 tablet (5 mg total) by mouth once daily.     VITAMIN D-3 ORAL  Take 4,000 Int'l Units/day by mouth once daily.     VYZULTA 0.024 % Drop  Generic drug: latanoprostene bunod  Place 1 drop into both eyes every evening.              Indwelling Lines/Drains at time of discharge:    Lines/Drains/Airways       None                   Time spent on the discharge of patient: 36 minutes         Valorie Lira MD  Department of Hospital Medicine  [facility] - Telemetry ([facility])

## 2025-05-03 NOTE — ASSESSMENT & PLAN NOTE
Patient has Diastolic (HFpEF) heart failure that is Chronic. On presentation their CHF was well compensated. MLatest ECHO  Results for orders placed during the hospital encounter of 04/26/25    Echo    Interpretation Summary    Left Ventricle: The left ventricle is normal in size. Normal wall thickness. There is normal systolic function with a visually estimated ejection fraction of 60 - 65%. Grade III diastolic dysfunction.    Right Ventricle: The right ventricle is normal in size. Wall thickness is normal. Systolic function is normal.    Left Atrium: Severely dilated    Mitral Valve: There is mild regurgitation.    Tricuspid Valve: There is mild to moderate regurgitation.    Pulmonary Artery: The estimated pulmonary artery systolic pressure is 54 mmHg.    IVC/SVC: Intermediate venous pressure at 8 mmHg.

## 2025-05-03 NOTE — ASSESSMENT & PLAN NOTE
Patient has Diastolic (HFpEF) heart failure that is Chronic. On presentation their CHF was well compensated.   Latest ECHO  Results for orders placed during the hospital encounter of 07/11/22    Echo    Interpretation Summary  · Concentric remodeling and normal systolic function.  · The estimated ejection fraction is 60%.  · Normal left ventricular diastolic function.  · Normal right ventricular size with normal right ventricular systolic function.      Resume bystolic   Resume home lasix on discharge

## 2025-05-08 ENCOUNTER — TELEPHONE (OUTPATIENT)
Dept: PALLIATIVE MEDICINE | Facility: CLINIC | Age: OVER 89
End: 2025-05-08
Payer: MEDICARE

## 2025-05-08 NOTE — TELEPHONE ENCOUNTER
----- Message from Bea sent at 5/8/2025  3:18 PM CDT -----  [facility]  Type:  Sooner Apoointment RequestCaller is requesting a sooner appointment.  Caller declined first available appointment listed below.  Caller will not accept being placed on the waitlist and is requesting a message be sent to doctor.Name of Caller: Ambrose is the first available appointment? NoneSymptoms: Need follow up appt., release from skilled facility tomorrowWould the patient rather a call back or a response via [facility]? Call [facility] Call Back Number: [phone] Information: 7-10 days after released

## 2025-05-09 ENCOUNTER — HOSPITAL ENCOUNTER (OUTPATIENT)
Dept: CARDIOLOGY | Facility: HOSPITAL | Age: OVER 89
Discharge: HOME | End: 2025-05-09
Attending: INTERNAL MEDICINE
Payer: MEDICARE

## 2025-05-09 DIAGNOSIS — Z95.0 CARDIAC PACEMAKER IN SITU: ICD-10-CM

## 2025-05-09 DIAGNOSIS — R00.1 SYMPTOMATIC SINUS BRADYCARDIA: ICD-10-CM

## 2025-05-09 LAB
OHS CV AF BURDEN PERCENT: 27.4
OHS CV DC REMOTE DEVICE TYPE: NORMAL
OHS CV RV PACING PERCENT: 13 %

## 2025-05-09 PROCEDURE — 93280 PM DEVICE PROGR EVAL DUAL: CPT | Mod: HCNC

## 2025-05-09 PROCEDURE — 93280 PM DEVICE PROGR EVAL DUAL: CPT | Mod: 26,HCNC,, | Performed by: INTERNAL MEDICINE

## 2025-05-12 ENCOUNTER — TELEPHONE (OUTPATIENT)
Dept: PRIMARY CARE CLINIC | Facility: CLINIC | Age: OVER 89
End: 2025-05-12
Payer: MEDICARE

## 2025-05-12 NOTE — TELEPHONE ENCOUNTER
Pt calling requesting a sooner appointment, offered an appointment with VINITA Harkins pt declined and requesting to only see Dr. Deleon. Pt reports that she has tried to contact Heather TALAMANTES and has been unsuccessful with contacting the clinic. Informed her that I will attempt to contact her team to reach out to her. Pt VU         ----- Message from Med Assistant King sent at 5/12/2025  9:07 AM CDT -----  Contact: [phone]  Good morning, Patient would like to get a sooner appointment for feet swelling.  ----- Message -----  From: Ally Murrieta  Sent: 5/12/2025   8:24 AM CDT  To: Angela BRADLEY Staff    .1MEDICALADVICE Patient is calling for Medical Advice regarding:asking for an appt feet swelling she states she sent a message and also medication How long has patient had these symptoms:Pharmacy name and phone#:Patient wants a call back or thru [facility]:call back Comments:She states she is needing to be seen today she states she needs to know something right away so she can be brought in a wheel chair Please advise patient replies from provider may take up to 48 hours.  ----- Message -----  From: Ally Murrieta  Sent: 5/12/2025   8:24 AM CDT  To: Angela BRADLEY Staff    .1MEDICALADVICE Patient is calling for Medical Advice regarding:asking for an appt feet swelling she states she sent a message and also medication How long has patient had these symptoms:Pharmacy name and phone#:Patient wants a call back or thru [facility]:call back Comments:She states she is needing to be seen today she states she needs to know something right away so she can be brought in a wheel chair Please advise patient replies from provider may take up to 48 hours.

## 2025-05-13 ENCOUNTER — OFFICE VISIT (OUTPATIENT)
Dept: PALLIATIVE MEDICINE | Facility: CLINIC | Age: OVER 89
End: 2025-05-13
Payer: MEDICARE

## 2025-05-13 ENCOUNTER — TELEPHONE (OUTPATIENT)
Dept: PALLIATIVE MEDICINE | Facility: CLINIC | Age: OVER 89
End: 2025-05-13
Payer: MEDICARE

## 2025-05-13 ENCOUNTER — TELEPHONE (OUTPATIENT)
Dept: PRIMARY CARE CLINIC | Facility: CLINIC | Age: OVER 89
End: 2025-05-13
Payer: MEDICARE

## 2025-05-13 VITALS
HEIGHT: 64 IN | OXYGEN SATURATION: 97 % | BODY MASS INDEX: 32.06 KG/M2 | WEIGHT: 187.81 LBS | DIASTOLIC BLOOD PRESSURE: 77 MMHG | TEMPERATURE: 98 F | HEART RATE: 112 BPM | SYSTOLIC BLOOD PRESSURE: 121 MMHG

## 2025-05-13 DIAGNOSIS — I13.2 HYPERTENSIVE HEART AND RENAL DISEASE WITH BOTH (CONGESTIVE) HEART FAILURE AND RENAL FAILURE: Chronic | ICD-10-CM

## 2025-05-13 DIAGNOSIS — N19 HYPERTENSIVE HEART AND RENAL DISEASE WITH BOTH (CONGESTIVE) HEART FAILURE AND RENAL FAILURE: Chronic | ICD-10-CM

## 2025-05-13 DIAGNOSIS — M10.379 GOUT OF FOOT DUE TO RENAL IMPAIRMENT, UNSPECIFIED CHRONICITY, UNSPECIFIED LATERALITY: Primary | ICD-10-CM

## 2025-05-13 DIAGNOSIS — I49.5 SICK SINUS SYNDROME: ICD-10-CM

## 2025-05-13 DIAGNOSIS — I48.19 PERSISTENT ATRIAL FIBRILLATION: Chronic | ICD-10-CM

## 2025-05-13 DIAGNOSIS — I95.9 HYPOTENSION, UNSPECIFIED HYPOTENSION TYPE: ICD-10-CM

## 2025-05-13 PROCEDURE — 1111F DSCHRG MED/CURRENT MED MERGE: CPT | Mod: CPTII,HCNC,S$GLB, | Performed by: NURSE PRACTITIONER

## 2025-05-13 PROCEDURE — 99215 OFFICE O/P EST HI 40 MIN: CPT | Mod: HCNC,S$GLB,, | Performed by: NURSE PRACTITIONER

## 2025-05-13 PROCEDURE — 3288F FALL RISK ASSESSMENT DOCD: CPT | Mod: CPTII,HCNC,S$GLB, | Performed by: NURSE PRACTITIONER

## 2025-05-13 PROCEDURE — 1160F RVW MEDS BY RX/DR IN RCRD: CPT | Mod: CPTII,HCNC,S$GLB, | Performed by: NURSE PRACTITIONER

## 2025-05-13 PROCEDURE — 1101F PT FALLS ASSESS-DOCD LE1/YR: CPT | Mod: CPTII,HCNC,S$GLB, | Performed by: NURSE PRACTITIONER

## 2025-05-13 PROCEDURE — 99417 PROLNG OP E/M EACH 15 MIN: CPT | Mod: HCNC,S$GLB,, | Performed by: NURSE PRACTITIONER

## 2025-05-13 PROCEDURE — 1159F MED LIST DOCD IN RCRD: CPT | Mod: CPTII,HCNC,S$GLB, | Performed by: NURSE PRACTITIONER

## 2025-05-13 PROCEDURE — 1125F AMNT PAIN NOTED PAIN PRSNT: CPT | Mod: CPTII,HCNC,S$GLB, | Performed by: NURSE PRACTITIONER

## 2025-05-13 PROCEDURE — 99999 PR PBB SHADOW E&M-EST. PATIENT-LVL IV: CPT | Mod: PBBFAC,HCNC,, | Performed by: NURSE PRACTITIONER

## 2025-05-13 RX ORDER — COLCHICINE 0.6 MG/1
TABLET ORAL
Qty: 12 TABLET | Refills: 11 | Status: SHIPPED | OUTPATIENT
Start: 2025-05-13

## 2025-05-13 RX ORDER — ALLOPURINOL 100 MG/1
100 TABLET ORAL EVERY OTHER DAY
Qty: 15 TABLET | Refills: 2 | Status: SHIPPED | OUTPATIENT
Start: 2025-05-13 | End: 2025-08-11

## 2025-05-13 RX ORDER — MIDODRINE HYDROCHLORIDE 2.5 MG/1
2.5 TABLET ORAL 3 TIMES DAILY PRN
Qty: 90 TABLET | Refills: 11 | Status: SHIPPED | OUTPATIENT
Start: 2025-05-13 | End: 2026-05-13

## 2025-05-13 RX ORDER — NEBIVOLOL 10 MG/1
10 TABLET ORAL DAILY
Qty: 30 TABLET | Refills: 2 | Status: SHIPPED | OUTPATIENT
Start: 2025-05-13 | End: 2025-08-11

## 2025-05-13 NOTE — PROGRESS NOTES
Palliative Medicine Clinic Note    Chief Complaint:   Chief Complaint   Patient presents with    Follow-up   Swelling.     ASSESSMENT/PLAN:      Plan/Recommendations:  1. 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   No further acute sx.   Stop colchicine, use as rescue only at renal dose.   Start renal dose of allopurinol.   Check serum uric acid in about 2 weeks.   Info provided on low purine diet.     Orders:  -     colchicine (COLCRYS) 0.6 mg tablet; Take two tablets at the first sign of gout flare followed in 1 hour with a single dose of 0.6 mg  Dispense: 12 tablet; Refill: 11  -     allopurinoL (ZYLOPRIM) 100 MG tablet; Take 1 tablet (100 mg total) by mouth every other day.  Dispense: 15 tablet; Refill: 2    2. Persistent atrial fibrillation  -     nebivoloL (BYSTOLIC) 10 MG Tab; Take 1 tablet (10 mg total) by mouth once daily.  Dispense: 30 tablet; Refill: 2    3. Hypotension, unspecified hypotension type  -     midodrine (PROAMATINE) 2.5 MG Tab; Take 1 tablet (2.5 mg total) by mouth 3 (three) times daily as needed (systolic BP < 95).  Dispense: 90 tablet; Refill: 11    4. 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   Elevated BP noted since hospital discharge. Minimal swelling, no weight gain.   Asked pt to monitor weights, notify of increase. We discussed sodium and fluid restrictions. Low threshold to increase diuresis.   Continues furosemide, increased dose of nebivolol since hospital discharge.   Will ask  nurse to check labs 5/21/25 prior to PCP appt.       5. Sick sinus syndrome  Assessment & Plan:  S/p pacemaker insertion.   Followed by cardiology.   Surgical site healing well.   Aware of and adhering to LUE restrictions.   Tachycardia noted today.          Advance Care  Planning   Advance Care Planning         Thank you for involving me in the care of this patient.     Follow up in about 4 weeks (around 6/10/2025).    Future Appointments   Date Time Provider Department Center   5/23/2025  1:00 PM Phylicia Deleon MD [id number]    6/24/2025  1:30 PM Jo-Ann Jacobo, PA-C [facility]   7/23/2025 10:30 AM Heather Miller NP [facility]   8/13/2025 11:30 AM Chelsea Hughes FNP-C [facility]   8/13/2025 11:30 AM [facility], [facility] AR[facility]THM[facility] ON [facility] PRO [facility]   12/10/2025  9:45 AM Toi Kelly MD [facility]        SUBJECTIVE:      History of Present Illness / Interval History:  Shirley Metz is 89 y.o. 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     Ms Rg was hospitalized 4/26/25-5/2/25 for symptomatic bradycardia and subsequent pacemaker insertion. During hospitalization she developed severe pain to bilateral feet. Uric acid was markedly elevated, she began colchicine and sx improved. She was discharged to SNF at [facility] on 5/2/25.    Presents to Palliative Care Clinic for physical symptoms, advance care planning and additional support. Please see cardiology for more details.         5/13/25  History obtained from: patient, medical record, and care everywhere.    Went to SNF at [facility]. Worked with PT, OT, did well with this.   Problems with nursing staff.   Foot pain has improved. Some pain to left foot yesterday.   Continues with left shoulder restrictions.     Swelling is worse in AM despite elevating during the night.     She is concerned about possible muscle weakness as possible side effect of colchicine. Loose stools after taking laxative Sunday PM.     Now bladder incontinence. Voiding each time she stands.     Headache this morning and BLE weakness.    Difficulty standing due to weakness.  Now has [facility], PT and OT.       Lab in about two weeks. Home health.   UA?       Today we discussed symptom management and goals of care.        ROS:  Review of Systems    Palliative Exam    Medications:  Current Medications[1]    External  database queried on 05/14/2025  by Heather CHU :     N/A    Review of patient's allergies indicates:   Allergen Reactions    Carvedilol Swelling     Lip swelling    Cephalexin Other (See Comments)     Muscle/joint weakness unable to move     Doxycycline Shortness Of Breath, Nausea And Vomiting and Other (See Comments)     weakness    Erythromycin Other (See Comments)     Complete weakness/could not walk    Gadolinium-containing contrast media Swelling     angioedema    Hydrocodone-acetaminophen Shortness Of Breath     wheezing    Labetalol Shortness Of Breath, Nausea Only, Palpitations and Other (See Comments)     weakness    Loratadine Other (See Comments)     Double vision/spots  Other reaction(s): double vision    Losartan Other (See Comments)     weakness    Loteprednol etabonate Other (See Comments), Palpitations, Anxiety and Shortness Of Breath     Patient stated bp went up and extreme muscle weakness    Naproxen      wheezing  wheezing  wheezing  Other reaction(s): wheezing    Prednisone Other (See Comments)     Myalgias and generalized weakness, unable to walk    Statins-hmg-coa reductase inhibitors Other (See Comments)     Severe Muscle weakness  Muscle weakness  Severe Muscle weakness  Other reaction(s): severe muscle weakness    Amlodipine Other (See Comments)     Myalgias    Fenofibrate Other (See Comments)     muscle weakness      Hydralazine Other (See Comments)     muscle tremors    Loteprednol Other (See Comments)     increased BP    Macrolide antibiotics Other (See Comments)     Complete weakness/could not walk      Moxifloxacin Hives and Other (See Comments)     muscle soreness    Timolol Other (See  "Comments)     Blurred vision, Burning eyes, Severe muscle weakness, eye problems.      Verapamil Diarrhea     Severe diarrhea.      Hydrocortisone     Isosorbide      Tolerates Imdur    Silver sulfadiaz-foam bandage     Tetanus and diphtheria toxoids     Tetanus vaccines and toxoid     Adhesive Rash     Clonidine patch--contact dermatitis    Codeine Diarrhea and Other (See Comments)     Loss of balance       Doxazosin Palpitations    Fexofenadine Other (See Comments)     Double vision/spots       Hydrocortisone (bulk) Other (See Comments)     Only suppository/ caused muscle weakness    Latanoprost Other (See Comments)     Muscle weakness      Olopatadine Other (See Comments)     Muscle weakness          OBJECTIVE:   Physical Exam:  Vitals: Temp: 98.2 °F (36.8 °C) (05/13/25 1158)  Pulse: (!) 112 (05/13/25 1158)  BP: 121/77 (05/13/25 1158)  SpO2: 97 % (05/13/25 1158)    Physical Exam  Eyes:      General: No scleral icterus.  Musculoskeletal:      Comments: Mild edema BLE.    Skin:     Comments: 2" incision to left anterior chest wall healing well, no erythema, induration. Mild puffiness around site as expected at this time post op.    Psychiatric:         Mood and Affect: Mood normal.         Behavior: Behavior normal.         Thought Content: Thought content normal.         Judgment: Judgment normal.         Wt Readings from Last 3 Encounters:   05/13/25 1158 85.2 kg (187 lb 13.3 oz)   05/01/25 0409 85.2 kg (187 lb 13.3 oz)   04/29/25 1931 82.4 kg (181 lb 10.5 oz)   04/27/25 1024 83.5 kg (184 lb 1.4 oz)   04/26/25 1349 83.5 kg (184 lb 1.4 oz)   04/26/25 1334 83.5 kg (184 lb)   04/21/25 0949 82 kg (180 lb 12.4 oz)     BP Readings from Last 3 Encounters:   05/13/25 121/77   05/02/25 132/68   04/21/25 124/77       Labs:  Lab Results   Component Value Date    WBC 11.40 04/30/2025    HGB 10.8 (L) 04/30/2025    HCT 33.9 (L) 04/30/2025     (H) 04/30/2025     04/30/2025           Imaging:  Reviewed.     I spent " a total of 87 minutes on the day of the visit. This includes face to face time in discussion of goals of care, symptom assessment, coordination of care and emotional support.  This also includes non-face to face time preparing to see the patient (eg, review of tests/imaging), obtaining and/or reviewing separately obtained history, documenting clinical information in the electronic or other health record, independently interpreting results and communicating results to the patient/family/caregiver, or care coordinator.         Heather Miller NP         [1]   Current Outpatient Medications:     acetaminophen (TYLENOL) 500 MG tablet, Take 500 mg by mouth nightly as needed., Disp: , Rfl:     ALPRAZolam (XANAX) 0.5 MG tablet, Take 1 tablet (0.5 mg total) by mouth nightly as needed for Anxiety., Disp: 4 tablet, Rfl: 0    artificial tears (ISOPTO TEARS) 0.5 % ophthalmic solution, Place 1 drop into both eyes as needed., Disp: , Rfl:     b complex vitamins capsule, Take 1 capsule by mouth once daily., Disp: , Rfl:     calcium carbonate/vitamin D3 (VITAMIN D-3 ORAL), Take 4,000 Int'l Units/day by mouth once daily., Disp: , Rfl:     ELIQUIS 2.5 mg Tab, TAKE 1 TABLET (2.5 MG TOTAL) BY MOUTH 2 (TWO) TIMES DAILY., Disp: 180 tablet, Rfl: 1    famotidine (PEPCID) 20 MG tablet, Take 1 tablet (20 mg total) by mouth every other day., Disp: , Rfl:     furosemide (LASIX) 40 MG tablet, Take 1 tab in am and 1/2 tab in evening, Disp: 90 tablet, Rfl: 1    latanoprostene bunod (VYZULTA) 0.024 % Drop, Place 1 drop into both eyes every evening., Disp: 2.5 mL, Rfl: 12    LIDOcaine (LIDODERM) 5 %, Place 1 patch onto the skin once daily. Remove & Discard patch within 12 hours or as directed by MD, Disp: , Rfl:     allopurinoL (ZYLOPRIM) 100 MG tablet, Take 1 tablet (100 mg total) by mouth every other day., Disp: 15 tablet, Rfl: 2    colchicine (COLCRYS) 0.6 mg tablet, Take two tablets at the first sign of gout flare followed in 1 hour with a  single dose of 0.6 mg, Disp: 12 tablet, Rfl: 11    midodrine (PROAMATINE) 2.5 MG Tab, Take 1 tablet (2.5 mg total) by mouth 3 (three) times daily as needed (systolic BP < 95)., Disp: 90 tablet, Rfl: 11    nebivoloL (BYSTOLIC) 10 MG Tab, Take 1 tablet (10 mg total) by mouth once daily., Disp: 30 tablet, Rfl: 2

## 2025-05-13 NOTE — TELEPHONE ENCOUNTER
"Offered pt an appointment for tomorrow to see , pt states " well I was just going to call my home health since I could not get in contact with Heather. She was the one who found my gout." Advised pt to call clinic back if she decides to take appointment for tomorrow. Pt VU.  "

## 2025-05-13 NOTE — ASSESSMENT & PLAN NOTE
Followed by cardiology and nephrology.   Elevated BP noted since hospital discharge. Minimal swelling, no weight gain.   Asked pt to monitor weights, notify of increase. We discussed sodium and fluid restrictions. Low threshold to increase diuresis.   Continues furosemide, increased dose of nebivolol since hospital discharge.   Will ask HH nurse to check labs 5/21/25 prior to PCP appt.

## 2025-05-13 NOTE — TELEPHONE ENCOUNTER
----- Message from Rhonda sent at 5/13/2025  9:06 AM CDT -----  Contact: ACOSTA SLAUGHTER [[id number]]  ..Type:  Same Day Appointment RequestCaller is requesting a same day appointment.  Caller declined first available appointment listed below.  Name of Caller: ACOSTA SLAUGHTER [[id number]]When is the first available appointment? N/aSymptoms: pace maker put in. Swelling in joints. Gout. Having loss of bladder Best Call Back Number: [phone] (home) Additional Information:

## 2025-05-14 PROBLEM — I95.9 HYPOTENSION: Status: ACTIVE | Noted: 2025-05-14

## 2025-05-14 PROBLEM — M10.379 GOUT OF FOOT DUE TO RENAL IMPAIRMENT: Status: ACTIVE | Noted: 2025-05-14

## 2025-05-14 NOTE — ASSESSMENT & PLAN NOTE
S/p pacemaker insertion.   Followed by cardiology.   Surgical site healing well.   Aware of and adhering to LUE restrictions.   Tachycardia noted today.

## 2025-05-14 NOTE — ASSESSMENT & PLAN NOTE
Onset just prior to hospitalization, debilitating then.   No further acute sx.   Stop colchicine, use as rescue only at renal dose.   Start renal dose of allopurinol.   Check serum uric acid in about 2 weeks.   Info provided on low purine diet.

## 2025-05-19 ENCOUNTER — PATIENT MESSAGE (OUTPATIENT)
Dept: PALLIATIVE MEDICINE | Facility: CLINIC | Age: OVER 89
End: 2025-05-19
Payer: MEDICARE

## 2025-05-20 NOTE — PHYSICIAN QUERY
Please clarify the Acuity of the Heart Failure diagnosis.    Acute on chronic diastolic heart failure (HFpEF)

## 2025-05-21 ENCOUNTER — LAB REQUISITION (OUTPATIENT)
Dept: LAB | Facility: HOSPITAL | Age: OVER 89
End: 2025-05-21
Payer: MEDICARE

## 2025-05-21 DIAGNOSIS — M10.379 GOUT DUE TO RENAL IMPAIRMENT, UNSPECIFIED ANKLE AND FOOT: ICD-10-CM

## 2025-05-21 LAB
ALBUMIN SERPL BCP-MCNC: 3.7 G/DL (ref 3.5–5.2)
ALP SERPL-CCNC: 62 UNIT/L (ref 40–150)
ALT SERPL W/O P-5'-P-CCNC: 14 UNIT/L (ref 10–44)
ANION GAP (OHS): 12 MMOL/L (ref 8–16)
AST SERPL-CCNC: 15 UNIT/L (ref 11–45)
BILIRUB SERPL-MCNC: 0.7 MG/DL (ref 0.1–1)
BNP SERPL-MCNC: 147 PG/ML (ref 0–99)
BUN SERPL-MCNC: 41 MG/DL (ref 10–30)
CALCIUM SERPL-MCNC: 9 MG/DL (ref 8.7–10.5)
CHLORIDE SERPL-SCNC: 105 MMOL/L (ref 95–110)
CO2 SERPL-SCNC: 23 MMOL/L (ref 23–29)
CREAT SERPL-MCNC: 1.6 MG/DL (ref 0.5–1.4)
ERYTHROCYTE [DISTWIDTH] IN BLOOD BY AUTOMATED COUNT: 13.4 % (ref 11.5–14.5)
GFR SERPLBLD CREATININE-BSD FMLA CKD-EPI: 30 ML/MIN/1.73/M2
GLUCOSE SERPL-MCNC: 179 MG/DL (ref 70–110)
HCT VFR BLD AUTO: 33.1 % (ref 37–48.5)
HGB BLD-MCNC: 11 GM/DL (ref 12–16)
MCH RBC QN AUTO: 33.3 PG (ref 27–31)
MCHC RBC AUTO-ENTMCNC: 33.2 G/DL (ref 32–36)
MCV RBC AUTO: 100 FL (ref 82–98)
PLATELET # BLD AUTO: 197 K/UL (ref 150–450)
PMV BLD AUTO: 9.8 FL (ref 9.2–12.9)
POTASSIUM SERPL-SCNC: 3.5 MMOL/L (ref 3.5–5.1)
PROT SERPL-MCNC: 6.8 GM/DL (ref 6–8.4)
RBC # BLD AUTO: 3.3 M/UL (ref 4–5.4)
SODIUM SERPL-SCNC: 140 MMOL/L (ref 136–145)
URATE SERPL-MCNC: 9.6 MG/DL (ref 2.4–5.7)
WBC # BLD AUTO: 5.39 K/UL (ref 3.9–12.7)

## 2025-05-21 PROCEDURE — 85027 COMPLETE CBC AUTOMATED: CPT | Mod: HCNC | Performed by: NURSE PRACTITIONER

## 2025-05-21 PROCEDURE — 84550 ASSAY OF BLOOD/URIC ACID: CPT | Mod: HCNC | Performed by: NURSE PRACTITIONER

## 2025-05-21 PROCEDURE — 80053 COMPREHEN METABOLIC PANEL: CPT | Mod: HCNC | Performed by: NURSE PRACTITIONER

## 2025-05-21 PROCEDURE — 83880 ASSAY OF NATRIURETIC PEPTIDE: CPT | Mod: HCNC | Performed by: NURSE PRACTITIONER

## 2025-05-23 ENCOUNTER — OFFICE VISIT (OUTPATIENT)
Dept: PRIMARY CARE CLINIC | Facility: CLINIC | Age: OVER 89
End: 2025-05-23
Payer: MEDICARE

## 2025-05-23 VITALS
WEIGHT: 175.69 LBS | SYSTOLIC BLOOD PRESSURE: 110 MMHG | OXYGEN SATURATION: 96 % | HEART RATE: 111 BPM | HEIGHT: 64 IN | BODY MASS INDEX: 29.99 KG/M2 | DIASTOLIC BLOOD PRESSURE: 64 MMHG | TEMPERATURE: 96 F

## 2025-05-23 DIAGNOSIS — M10.379 GOUT OF FOOT DUE TO RENAL IMPAIRMENT, UNSPECIFIED CHRONICITY, UNSPECIFIED LATERALITY: ICD-10-CM

## 2025-05-23 DIAGNOSIS — E21.3 HYPERPARATHYROIDISM: ICD-10-CM

## 2025-05-23 DIAGNOSIS — Z95.0 CONTROL OF ATRIAL FIBRILLATION WITH PACEMAKER: ICD-10-CM

## 2025-05-23 DIAGNOSIS — E53.8 FOLATE DEFICIENCY: ICD-10-CM

## 2025-05-23 DIAGNOSIS — E79.0 HYPERURICEMIA: Primary | ICD-10-CM

## 2025-05-23 DIAGNOSIS — E53.8 B12 DEFICIENCY: ICD-10-CM

## 2025-05-23 DIAGNOSIS — I48.91 CONTROL OF ATRIAL FIBRILLATION WITH PACEMAKER: ICD-10-CM

## 2025-05-23 DIAGNOSIS — N18.4 CKD STAGE 4 SECONDARY TO HYPERTENSION: Chronic | ICD-10-CM

## 2025-05-23 DIAGNOSIS — I12.9 CKD STAGE 4 SECONDARY TO HYPERTENSION: Chronic | ICD-10-CM

## 2025-05-23 DIAGNOSIS — D53.9 MACROCYTIC ANEMIA: ICD-10-CM

## 2025-05-23 DIAGNOSIS — I49.5 TACHY-BRADY SYNDROME: ICD-10-CM

## 2025-05-23 PROCEDURE — 99999 PR PBB SHADOW E&M-EST. PATIENT-LVL IV: CPT | Mod: PBBFAC,HCNC,, | Performed by: INTERNAL MEDICINE

## 2025-05-23 RX ORDER — ALLOPURINOL 100 MG/1
100 TABLET ORAL DAILY
Qty: 30 TABLET | Refills: 2 | Status: SHIPPED | OUTPATIENT
Start: 2025-05-23 | End: 2025-08-21

## 2025-05-23 NOTE — PROGRESS NOTES
Shirley Metz  05/23/2025  [id number]    Phylicia Deleon MD  Patient Care Team:  Phylicia Deleon MD as PCP - General (Internal Medicine)  Wilbert Ware MD as Consulting Physician (Ophthalmology)  Rajinder Quintanilla MD as Consulting Physician (Ophthalmology)  Rosario Valadez MD as Consulting Physician (Dermatology)  Amish Mendoza MD (Pulmonary Disease)  Emmanuel Fish MD as Consulting Physician (Hand Surgery)  Phylicia Deleon MD as Physician (Internal Medicine)  Heather Miller NP as Nurse Practitioner (Palliative Medicine)  Jo-Ann Jacobo PA-C as Physician Assistant (Cardiology)  Chelsea Hughes FNP-C as Nurse Practitioner (Cardiology)  Wilbert Worthington MD as Consulting Physician (Orthopedic Surgery)    Visit Type: Follow-up    Ms. Edmond is a 89 year old female w multiple chronic medical issues including Diastolic CHF, HTN, atherosclerosis, CKD, Afib/aflutter w RVR on Eliquis, obesity, pre-diabetes, osteopenia, and OA.      She is also being followed by outpatient Palliative Care NP Angela    Check B12? Uric acid?    History of Present Illness    CHIEF COMPLAINT:  Ms. Sg Metz presents today for follow up after recent hospitalization and pacemaker placement    RECENT PACEMAKER SURGERY:  During recent hospitalization she underwent two consecutive pacemaker placement procedures due to lead attachment issues identified on first post-procedure imaging. She has strict movement restrictions for left arm through mid-June. She has been unable to use the prescribed sling/cast combination device due to difficulties getting it on. Instead she is taking precautions by propping arm at night and avoiding sleeping on affected side and abiding by stated movement and lifting restrictions. C/o right wrist pain due to having to push up w right arm to rise from sitting given restrictions on use of left arm.    GOUT MANAGEMENT:  She developed severe burning foot pain  during hospitalization. She was seen by [facility] who suspected gout flare and ordered uric acid level that was elevated at 11.4 on May 2nd. Repeat uric acid level May 21st showed improvement to 9.6. Advised Ms. Bettencourt that goal is to reducing level below 6. She has recently discontinued colchicine and started allopurinol 100 mg EOD. She has taken 4 doses so far. She has colchicine prescribed for emergency use but is no longer currently taking it. She experienced urinary incontinence while taking the colchicine therefore attributes this to a side effect of the colchicine (suspect more likely due to increased diuresis during this time?)    FLUID RETENTION:  She reports new onset lateral ankle swelling, worse on left side.  She is unable to wear compression socks due to current limitations in using left hand. Leg elevation has not provided relief, with swelling persisting in the morning.    MEDICATIONS:  She has been taking doubled dose of Bystolic for approximately 3 weeks. She takes Lasix with occasional metolazone for fluid management but has not taken the metolazone recently. She takes Miralax twice daily, morning and evening, which has improved her constipation.    HOME HEALTH SERVICES:  She receives twice weekly  physical therapy sessions with two different therapists, regular nursing visits including blood draws, and she currently also has assistance from a home health aide for bathing.    ROS:  General: -fever, -chills, -fatigue, -weight gain, +weight loss  Eyes: -vision changes, -redness, -discharge  ENT: -ear pain, -nasal congestion, -sore throat  Cardiovascular: -chest pain, -palpitations, +lower extremity edema  Respiratory: -cough, -shortness of breath, +exertional dyspnea  Gastrointestinal: -abdominal pain, -nausea, -vomiting, -diarrhea, +constipation, -blood in stool  Genitourinary: -dysuria, -hematuria, +urinary incontinence  Musculoskeletal: -joint pain, -muscle pain, +limb  swelling, +difficulty standing up, +muscle weakness  Skin: -rash, -lesion  Neurological: -headache, +dizziness, -numbness, -tingling, +near-syncope  Psychiatric: -anxiety, -depression, -sleep difficulty        PHQ-4 Score: 3     From LOV w me 1/03/25  CHIEF COMPLAINT:  Ms. Bettencourt presents today for medication management and follow-up on multiple health issues.  CURRENT MEDICATIONS:  She takes Lasix 40 mg morning and 1/2 of 40 mg (20 mg) evening, Metoprolol 25 mg x 3 (75 mg) twice daily with instructions to take 25 mg x 4 (100 mg) if heart rate is over 110. She has been advised to take Midodrine as needed if BP is low rather than decrease or hold Metoprolol. She takes Metolazone aprx once weekly. She is working on compiling a comprehensive list of medication allergies.  PAIN MANAGEMENT:  She experiences diffuse musculoskeletal pain from neck to feet, predominantly muscular in nature. She reports bilateral shoulder pain, including the shoulder with previous implant. Her wrist has increased pain with associated swelling. She uses topical Arnica and a Mirtha topical cream with good effect, with periodic breaks from Arnica use. She wears hand splints at night which provides relief.  OCULAR:  She is followed by Dr. Conley with [facility] for glaucoma. She experiences double vision while reading and reports a granulated area blocking vision in her left eye, described as looking through sugar crystals, which impairs her reading ability. She discontinued prednisone drops prescribed by  for eye inflammation after 5 days due to what she felt to be an allergic reaction affecting her mobility.  PHQ-4 Score: 0     Hosp/ED/Urgent Care:  5/02-5/08/25 [facility]  4/26-5/02/25 [facility]    4/30/25 pacemaker placement    Recent appointments:   5/13/25 Pall Angela    Upcoming appointments:  Future Appointments       Date Provider Specialty Appt Notes    6/9/2025  Lab B12 deficiency [E53.8]    6/11/2025 Angela,  Heather MCRAE NP Palliative Medicine Heather 's ep 3 months    6/24/2025 Jo-Ann Jacobo PA-C Cardiology 4 mon f/u    6/30/2025 Shahana Louie FNP-C Primary Care f/u    8/13/2025 Chelsea Hughes FNP-C Cardiology Bio/ppm  3mo post implant    8/13/2025  Cardiology Bio/ppm  3mo post implant    12/10/2025 Toi Kelly MD Cardiology 1 yr// annual           The following were reviewed: Active problem list, medication list, allergies, family history, social history, and Health Maintenance.     Medications have been reviewed and reconciled with patient at visit today.    Exam: sat 96%  Vitals:    05/23/25 1309   BP: 110/64   Pulse: (!) 111   Temp: 96.2 °F (35.7 °C)     Weight: 79.7 kg (175 lb 11.3 oz)   Body mass index is 30.16 kg/m².    BP Readings from Last 3 Encounters:   05/23/25 110/64   05/13/25 121/77   05/02/25 132/68      Wt Readings from Last 3 Encounters:   05/23/25 1309 79.7 kg (175 lb 11.3 oz)   05/13/25 1158 85.2 kg (187 lb 13.3 oz)   05/01/25 0409 85.2 kg (187 lb 13.3 oz)   04/29/25 1931 82.4 kg (181 lb 10.5 oz)   04/27/25 1024 83.5 kg (184 lb 1.4 oz)   04/26/25 1349 83.5 kg (184 lb 1.4 oz)   04/26/25 1334 83.5 kg (184 lb)        Physical Exam  Vitals reviewed.   Constitutional:       General: She is not in acute distress.     Appearance: Normal appearance.   HENT:      Head: Normocephalic and atraumatic.      Right Ear: External ear normal.      Left Ear: External ear normal.      Nose: Nose normal.      Mouth/Throat:      Mouth: Mucous membranes are moist.      Pharynx: Oropharynx is clear.   Eyes:      General: No scleral icterus.     Extraocular Movements: Extraocular movements intact.      Conjunctiva/sclera: Conjunctivae normal.      Comments: glasses   Neck:      Vascular: No carotid bruit.   Cardiovascular:      Rate and Rhythm: Regular rhythm. Tachycardia present.      Heart sounds: No murmur heard.     Comments: Left upper chest wall pacemaker insertion site clean and dry, appears to be  healing well  No sig erythema or swelling. No tenderness or drainage.   Pulmonary:      Effort: Pulmonary effort is normal. No respiratory distress.      Breath sounds: No wheezing, rhonchi or rales.   Abdominal:      General: Bowel sounds are normal. There is no distension.      Palpations: Abdomen is soft.      Tenderness: There is no abdominal tenderness. There is no guarding.   Musculoskeletal:      Right lower leg: Edema present.      Left lower leg: Edema present.      Comments: Primarily feet and lateral ankles   Lymphadenopathy:      Cervical: No cervical adenopathy.   Skin:     General: Skin is warm and dry.      Findings: No erythema.      Comments: Toes w somewhat edematous but no sig erythema noted   Neurological:      Mental Status: She is alert and oriented to person, place, and time. Mental status is at baseline.      Coordination: Coordination normal.      Gait: Gait abnormal.      Comments: Moves, speaks, responds somewhat slowly  Ambulates w rollator for support   Psychiatric:         Mood and Affect: Mood normal.         Behavior: Behavior normal.        Laboratory Reviewed  Lab Results   Component Value Date    WBC 5.39 05/21/2025    HGB 11.0 (L) 05/21/2025    HCT 33.1 (L) 05/21/2025     05/21/2025     (H) 05/21/2025    CHOL 170 08/04/2023    TRIG 116 08/04/2023    HDL 40 08/04/2023    LDLCALC 106.8 08/04/2023    ALT 14 05/21/2025    AST 15 05/21/2025     05/21/2025    K 3.5 05/21/2025     05/21/2025    CREATININE 1.6 (H) 05/21/2025    BUN 41 (H) 05/21/2025    CO2 23 05/21/2025    MG 2.3 04/30/2025    TSH 2.946 04/27/2025    FREET4 0.91 03/20/2025    INR 1.1 10/25/2022    HGBA1C 7.7 (H) 04/28/2025    CRP 2.1 10/30/2023     Lab Results   Component Value Date    .2 (H) 04/27/2023    CALCIUM 9.0 05/21/2025    PHOS 3.1 04/30/2025      Lab Results   Component Value Date    RJCYQAZF93 334 09/23/2024     Lab Results   Component Value Date    FOLATE 9.3 04/27/2025    No  "results found for: "UIBC", "IRON", "TRANS", "TRANSFERRIN", "TIBC", "LABIRON", "FESATURATED"   Lab Results   Component Value Date    EGFRNORACEVR 30 (L) 05/21/2025    ALBUMIN 3.7 05/21/2025     (H) 05/21/2025     Lab Results   Component Value Date    [id number] 51 11/13/2017 5/21/25 Uric acid 9.6 (5/02/25 11.4)    Assessment:   89 y.o. female with multiple co-morbid illnesses here for continued follow up of medical problems.      The primary encounter diagnosis was Hyperuricemia. 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      Plan:   1. Hyperuricemia  -     URIC ACID; Future; Expected date: 06/09/2025    2. 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  Dispense: 30 tablet; Refill: 2  -     URIC ACID; Future; Expected date: 06/09/2025    3. B12 deficiency  -     Vitamin B12; Future; Expected date: 06/09/2025    4. Macrocytic anemia  Overview:  recurrent    Assessment & Plan:  Last B12 lower end of normal - reports has not been taking B12 recently - f/u CBC, B12       5. Folate deficiency  -     FOLATE; Future; Expected date: 06/09/2025    6. Hyperparathyroidism  -     Vitamin D; Future; Expected date: 06/09/2025  -     PTH, Intact; Future; Expected date: 06/09/2025    7. Control of atrial fibrillation with pacemaker  Overview:  [facility]02/25 [facility]    4/30/25 pacemaker placement    Assessment & Plan:  PM placed during recent Hospitalization - still mildly tachycardic w nebivolol dose increased to 10 mg - PM placement site healing well - cont eliquis at reduced dose of 2.5 mg BID " (decreased due to age/CKD) f/u cards as scheduled      8. Tachy-ed syndrome  Overview:  4/26-5/02/25 [facility]    4/30/25 pacemaker placement    Assessment & Plan:  S/p PM placement - Cont tachycardia in 110's w increase of nebivolol to 10 mg - f/u cardiology as scheduled - if want to increase beta-blocker dose may need to decrease furosemide and/or increase midodrine      9. 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  Assessment & Plan:  Cont monitor closely - avoid nephrotoxins - avoid overly aggressive BP lowering - renal dose medications as needed           Health Maintenance         Date Due Completion Date    COVID-19 Vaccine (6 - 2024-25 season) 09/13/2024 7/19/2024    Diabetic Eye Exam 08/05/2025 8/5/2024 (Done)    Override on 8/5/2024: Done (Dr Prescott)    TETANUS VACCINE 04/21/2026 (Originally 11/9/2021) 11/9/2011    Shingles Vaccine (1 of 2) 04/21/2026 (Originally 12/3/1982) ---    Diabetes Urine Screening 10/16/2025 10/16/2024    Hemoglobin A1c 10/28/2025 4/28/2025          -Patient's lab results were reviewed and discussed with patient  -Treatment options and alternatives were discussed with the patient. Patient expressed understanding. Patient was given the opportunity to ask questions and be an active participant in their medical care. Patient had no further questions or concerns at this time.     Follow up: Follow up in about 1 month (around 6/23/2025) for Follow Up.    After visit summary printed and given to patient upon discharge.  Patient care plan included in After visit summary.    TOTAL TIME evaluating and managing this patient for this encounter was 50 minutes. This time was spent personally by me on some of the following activities: review of patient's past medical history, assessing age-appropriate health maintenance needs, review of any interval history, review and interpretation of lab results, review and interpretation of imaging test results, review and interpretation of  cardiology test results, reviewing consulting specialist notes, obtaining history from the patient and family, examination of the patient, medication reconciliation, managing and/or ordering prescription medications, ordering imaging tests, ordering referral to subspecialty provider(s), educating patient and answering their questions about diagnosis, treatment plan, and goals of treatment, discussing planned follow-up and final documentation of the visit. This time was exclusive of any separately billable procedures for this patient and exclusive of time spent treating any other patients.     Visit today included increased complexity associated with the care of the episodic problem: increased LE edema and gout, recent pacemaker placement addressed and managing the longitudinal care of the patient due to the serious and/or complex managed problem(s): Macrocytic anemia w h/o B12 deficiency, Hyperparathyroidism, atrial fibrillation, tachy-ed syndrome, and CKD stage 4.    This note was generated with the assistance of ambient listening technology. Verbal consent was obtained by the patient and accompanying visitor(s) for the recording of patient appointment to facilitate this note. I attest to having reviewed and edited the generated note for accuracy, though some syntax or spelling errors may persist. Please contact the author of this note for any clarification.

## 2025-05-23 NOTE — PATIENT INSTRUCTIONS
If you are feeling unwell, we'd like to be the first ones to know here at [facility]! Please give us a call. Same day appointments are our top priority to keep you well and out of the emergency rooms and hospitals. Call [phone] for our direct line. After hours advice is always available. Please call [phone] after hours to speak to the on-call team.

## 2025-05-25 PROBLEM — Z51.5 ENCOUNTER FOR PALLIATIVE CARE: Status: RESOLVED | Noted: 2024-08-08 | Resolved: 2025-05-25

## 2025-05-25 PROBLEM — Z00.00 ENCOUNTER FOR PREVENTIVE HEALTH EXAMINATION: Status: RESOLVED | Noted: 2025-02-24 | Resolved: 2025-05-25

## 2025-05-25 PROBLEM — D53.9 MACROCYTIC ANEMIA: Status: ACTIVE | Noted: 2018-11-29

## 2025-05-25 PROBLEM — D75.89 MACROCYTOSIS WITHOUT ANEMIA: Status: RESOLVED | Noted: 2024-07-31 | Resolved: 2025-05-25

## 2025-05-25 NOTE — ASSESSMENT & PLAN NOTE
PM placed during recent Hospitalization - still mildly tachycardic w nebivolol dose increased to 10 mg - PM placement site healing well - cont eliquis at reduced dose of 2.5 mg BID (decreased due to age/CKD) f/u cards as scheduled

## 2025-05-25 NOTE — ASSESSMENT & PLAN NOTE
S/p PM placement - Cont tachycardia in 110's w increase of nebivolol to 10 mg - f/u cardiology as scheduled - if want to increase beta-blocker dose may need to decrease furosemide and/or increase midodrine

## 2025-05-25 NOTE — ASSESSMENT & PLAN NOTE
Cont monitor closely - avoid nephrotoxins - avoid overly aggressive BP lowering - renal dose medications as needed

## 2025-05-29 ENCOUNTER — CLINICAL SUPPORT (OUTPATIENT)
Dept: CARDIOLOGY | Facility: HOSPITAL | Age: OVER 89
End: 2025-05-29
Payer: MEDICARE

## 2025-05-29 ENCOUNTER — CLINICAL SUPPORT (OUTPATIENT)
Dept: CARDIOLOGY | Facility: HOSPITAL | Age: OVER 89
End: 2025-05-29
Attending: INTERNAL MEDICINE
Payer: MEDICARE

## 2025-05-29 DIAGNOSIS — I49.5 SICK SINUS SYNDROME: ICD-10-CM

## 2025-05-29 DIAGNOSIS — I48.92 UNSPECIFIED ATRIAL FLUTTER: ICD-10-CM

## 2025-05-29 DIAGNOSIS — Z95.0 PRESENCE OF CARDIAC PACEMAKER: ICD-10-CM

## 2025-05-29 PROCEDURE — 93296 REM INTERROG EVL PM/IDS: CPT | Mod: HCNC | Performed by: INTERNAL MEDICINE

## 2025-05-29 PROCEDURE — 93294 REM INTERROG EVL PM/LDLS PM: CPT | Mod: HCNC,S$GLB,, | Performed by: INTERNAL MEDICINE

## 2025-05-30 LAB
OHS CV AF BURDEN PERCENT: 92
OHS CV DC REMOTE DEVICE TYPE: NORMAL
OHS CV ICD SHOCK: NO
OHS CV RV PACING PERCENT: 6 %

## 2025-06-02 ENCOUNTER — TELEPHONE (OUTPATIENT)
Dept: HEMATOLOGY/ONCOLOGY | Facility: CLINIC | Age: OVER 89
End: 2025-06-02
Payer: MEDICARE

## 2025-06-02 ENCOUNTER — TELEPHONE (OUTPATIENT)
Dept: PALLIATIVE MEDICINE | Facility: CLINIC | Age: OVER 89
End: 2025-06-02
Payer: MEDICARE

## 2025-06-02 ENCOUNTER — TELEPHONE (OUTPATIENT)
Dept: CARDIOLOGY | Facility: HOSPITAL | Age: OVER 89
End: 2025-06-02
Payer: MEDICARE

## 2025-06-02 DIAGNOSIS — N19 HYPERTENSIVE HEART AND RENAL DISEASE WITH BOTH (CONGESTIVE) HEART FAILURE AND RENAL FAILURE: Primary | Chronic | ICD-10-CM

## 2025-06-02 DIAGNOSIS — I13.2 HYPERTENSIVE HEART AND RENAL DISEASE WITH BOTH (CONGESTIVE) HEART FAILURE AND RENAL FAILURE: Primary | Chronic | ICD-10-CM

## 2025-06-02 RX ORDER — METOLAZONE 2.5 MG/1
2.5 TABLET ORAL DAILY PRN
Qty: 15 TABLET | Refills: 11 | Status: SHIPPED | OUTPATIENT
Start: 2025-06-02 | End: 2026-06-02

## 2025-06-04 ENCOUNTER — PATIENT MESSAGE (OUTPATIENT)
Dept: PALLIATIVE MEDICINE | Facility: CLINIC | Age: OVER 89
End: 2025-06-04
Payer: MEDICARE

## 2025-06-06 NOTE — TELEPHONE ENCOUNTER
Patient called in regards to her having difficulty after her heart procedure. She says the left side of her head is starting to feel better & she can swallow a little better. She says she is having issues with SOB and asked if she can speak with NP Heather Miller. Patient call was transferred to Heather pt verbally understood the information given.    Roblestcb. Dominican speaker

## 2025-06-09 ENCOUNTER — LAB VISIT (OUTPATIENT)
Dept: LAB | Facility: HOSPITAL | Age: OVER 89
End: 2025-06-09
Attending: INTERNAL MEDICINE
Payer: MEDICARE

## 2025-06-09 DIAGNOSIS — E79.0 HYPERURICEMIA: ICD-10-CM

## 2025-06-09 DIAGNOSIS — E53.8 B12 DEFICIENCY: ICD-10-CM

## 2025-06-09 DIAGNOSIS — E21.3 HYPERPARATHYROIDISM: ICD-10-CM

## 2025-06-09 DIAGNOSIS — M10.379 GOUT OF FOOT DUE TO RENAL IMPAIRMENT, UNSPECIFIED CHRONICITY, UNSPECIFIED LATERALITY: ICD-10-CM

## 2025-06-09 LAB
25(OH)D3+25(OH)D2 SERPL-MCNC: 62 NG/ML (ref 30–96)
URATE SERPL-MCNC: 10.3 MG/DL (ref 2.4–5.7)
VIT B12 SERPL-MCNC: 543 PG/ML (ref 210–950)

## 2025-06-09 PROCEDURE — 84550 ASSAY OF BLOOD/URIC ACID: CPT | Mod: HCNC

## 2025-06-09 PROCEDURE — 82607 VITAMIN B-12: CPT | Mod: HCNC

## 2025-06-09 PROCEDURE — 82306 VITAMIN D 25 HYDROXY: CPT | Mod: HCNC

## 2025-06-09 PROCEDURE — 36415 COLL VENOUS BLD VENIPUNCTURE: CPT | Mod: HCNC

## 2025-06-09 PROCEDURE — 83970 ASSAY OF PARATHORMONE: CPT | Mod: HCNC

## 2025-06-10 ENCOUNTER — RESULTS FOLLOW-UP (OUTPATIENT)
Dept: PRIMARY CARE CLINIC | Facility: CLINIC | Age: OVER 89
End: 2025-06-10

## 2025-06-10 ENCOUNTER — TELEPHONE (OUTPATIENT)
Dept: PRIMARY CARE CLINIC | Facility: CLINIC | Age: OVER 89
End: 2025-06-10
Payer: MEDICARE

## 2025-06-10 DIAGNOSIS — M10.379 GOUT OF FOOT DUE TO RENAL IMPAIRMENT, UNSPECIFIED CHRONICITY, UNSPECIFIED LATERALITY: ICD-10-CM

## 2025-06-10 LAB — PTH-INTACT SERPL-MCNC: 170.4 PG/ML (ref 9–77)

## 2025-06-10 RX ORDER — ALLOPURINOL 300 MG/1
300 TABLET ORAL DAILY
Qty: 30 TABLET | Refills: 2 | Status: SHIPPED | OUTPATIENT
Start: 2025-06-10 | End: 2025-09-08

## 2025-06-10 NOTE — TELEPHONE ENCOUNTER
Spoke with pt, gave medication instructions, pt verbalized understanding.    SJ        ----- Message from Phylicia Deleon MD sent at 6/10/2025  8:28 AM CDT -----  Pt has [facility] - if message below not viewed please call w information below:   Good day Ms. Bettencourt    Your uric acid level is elevated. Are you having a gout flare at present?  If not, I recommend increasing the allopurinol to the 300 mg daily dose.  If you are currently experiencing a gout flare we'll want to wait until it resolves to increase the allopurinol.  B12 and vitamin D levels looks good. Please continue with current supplementation.    Please message or call with any questions or concerns!  Thank you!  Phylicia Deleon MD, MPH  [facility]   ----- Message -----  From: Lab, Background User  Sent: 6/9/2025   9:34 PM CDT  To: Phylicia Deleon MD

## 2025-06-11 ENCOUNTER — OFFICE VISIT (OUTPATIENT)
Dept: PALLIATIVE MEDICINE | Facility: CLINIC | Age: OVER 89
End: 2025-06-11
Payer: MEDICARE

## 2025-06-11 ENCOUNTER — TELEPHONE (OUTPATIENT)
Dept: PRIMARY CARE CLINIC | Facility: CLINIC | Age: OVER 89
End: 2025-06-11
Payer: MEDICARE

## 2025-06-11 VITALS
HEIGHT: 64 IN | SYSTOLIC BLOOD PRESSURE: 94 MMHG | HEART RATE: 81 BPM | DIASTOLIC BLOOD PRESSURE: 62 MMHG | OXYGEN SATURATION: 96 % | TEMPERATURE: 97 F | BODY MASS INDEX: 30.16 KG/M2

## 2025-06-11 DIAGNOSIS — M10.379 GOUT OF FOOT DUE TO RENAL IMPAIRMENT, UNSPECIFIED CHRONICITY, UNSPECIFIED LATERALITY: ICD-10-CM

## 2025-06-11 DIAGNOSIS — R26.9 ABNORMALITY OF GAIT AND MOBILITY: Chronic | ICD-10-CM

## 2025-06-11 DIAGNOSIS — Z51.5 ENCOUNTER FOR PALLIATIVE CARE: ICD-10-CM

## 2025-06-11 DIAGNOSIS — I50.33 ACUTE ON CHRONIC DIASTOLIC HEART FAILURE: Primary | ICD-10-CM

## 2025-06-11 DIAGNOSIS — I48.91 ATRIAL FIBRILLATION WITH RVR: Chronic | ICD-10-CM

## 2025-06-11 PROCEDURE — 1101F PT FALLS ASSESS-DOCD LE1/YR: CPT | Mod: CPTII,HCNC,S$GLB, | Performed by: NURSE PRACTITIONER

## 2025-06-11 PROCEDURE — 99215 OFFICE O/P EST HI 40 MIN: CPT | Mod: HCNC,S$GLB,, | Performed by: NURSE PRACTITIONER

## 2025-06-11 PROCEDURE — 1125F AMNT PAIN NOTED PAIN PRSNT: CPT | Mod: CPTII,HCNC,S$GLB, | Performed by: NURSE PRACTITIONER

## 2025-06-11 PROCEDURE — 3288F FALL RISK ASSESSMENT DOCD: CPT | Mod: CPTII,HCNC,S$GLB, | Performed by: NURSE PRACTITIONER

## 2025-06-11 PROCEDURE — 1159F MED LIST DOCD IN RCRD: CPT | Mod: CPTII,HCNC,S$GLB, | Performed by: NURSE PRACTITIONER

## 2025-06-11 PROCEDURE — 1160F RVW MEDS BY RX/DR IN RCRD: CPT | Mod: CPTII,HCNC,S$GLB, | Performed by: NURSE PRACTITIONER

## 2025-06-11 PROCEDURE — 99999 PR PBB SHADOW E&M-EST. PATIENT-LVL IV: CPT | Mod: PBBFAC,HCNC,, | Performed by: NURSE PRACTITIONER

## 2025-06-11 NOTE — TELEPHONE ENCOUNTER
[facility]           ----- Message from Phylicia Deleon MD sent at 6/11/2025  8:22 AM CDT -----  Pt has [facility] - if message below not viewed please call w information below:   Parathyroid hormone level remains elevated - most likely due to decreased renal function.  On recent labwork your calcium level was good though.  Recommend to continue current plan of care.    Please message or call with any questions or concerns!  Thank you!  Phylicia Deleon MD, MPH  [facility]   ----- Message -----  From: Lab, Background User  Sent: 6/9/2025   9:34 PM CDT  To: Phylicia Deleon MD

## 2025-06-11 NOTE — PATIENT INSTRUCTIONS
Consider B complex vitamin daily with goal B12 intake to equal 1000 mcg.     Review low purine diet.

## 2025-06-11 NOTE — PROGRESS NOTES
Palliative Medicine Clinic Note    Chief Complaint: No chief complaint on file.  F/U atrial fibrillation, gout, debility r/t recent hospitalization.      ASSESSMENT/PLAN:      Plan/Recommendations:  1. Acute on chronic diastolic heart failure  Assessment & Plan:  Grade III diastolic HF.  Followed by cardiology.   Only fair sx control with increased dose of Bystolic, high doses of diuretics and vigilant HR control.   She has required significant increase in frequecy of PRN metolazone recently to achieve this.  Will ask for additional SNV to monitor sx of heart failure, medication use with anticipated further dose adjustments. Ms Flower lives independently, has impaired mobility and it is great strain for her to leave home for frequent assessments.     Orders:  -     SUBSEQUENT HOME HEALTH ORDERS  -     Cancel: Comprehensive Metabolic Panel; Future; Expected date: 06/13/2025  -     Cancel: BNP; Future; Expected date: 06/13/2025  -     Comprehensive Metabolic Panel; Future; Expected date: 06/20/2025    2. 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 Now increasing uric acid despite allopurinol.   Colchicine as rescue only at renal dose.   PCP recently increased allopurinol dose. If response inadequate then consider e-consult to rheumatology.  Encouraged low purine diet.     Orders:  -     Uric Acid; Future; Expected date: 06/27/2025  -     Comprehensive Metabolic Panel; Future; Expected date: 06/20/2025    3. Abnormality of gait and mobility  Assessment & Plan:  Uses rollator at baseline; since hospitalization mobility compromised due to weakness r/t acute illness, severe goty pain of feet and legs, LUE restrictions s/p pacemaker insertion.   Gradually improving toward baseline with assistance of home PT and OT and gout mgmt home LUE restrictions remain through July 17.       4. Encounter for palliative care  Assessment &  Plan:  Multiple complex health problems that are not fixable and resistant to tx.   Continues follow up to ensure medical tx correlates with her wishes.        5. Atrial fibrillation with RVR  Assessment & Plan:  Continues with rapid ventricular rate at rest despite maximum intervention.             Advance Care Planning   Advance Care Planning         Thank you for involving me in the care of this patient.     No follow-ups on file.    Future Appointments   Date Time Provider Department Center   6/24/2025  1:30 PM Jo-Ann Jacobo PA-C ON [facility]   6/30/2025 10:40 AM Shahana Louie FNP-C [facility] [place]   8/13/2025 11:30 AM Chelsea Hughes FNP-C ON [facility]R [facility]   8/13/2025 11:30 AM [facility], [facility] ARRHYTHMIA ON [facility]   9/15/2025 10:30 AM Heather Miller NP [facility]   12/10/2025  9:45 AM Toi Kelly MD [facility] ARR [facility]        SUBJECTIVE:      History of Present Illness / Interval History:  Shirley Metz is 89 y.o. 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      Ms Rg was hospitalized 4/26/25-5/2/25 for symptomatic bradycardia and subsequent pacemaker insertion. During hospitalization she developed severe pain to bilateral feet. Uric acid was markedly elevated, she began colchicine and sx improved. She was discharged to SNF at [facility] on 5/2/25.     Presents to Palliative Care Clinic for physical symptoms, advance care planning and additional support. Please see cardiology for more details.       Interval History  History obtained from: patient and medical record.    HH aide and SN services recently denied. Her activity remains restricted through July 17 after pacemaker insertion then revision. Due to prolonged hospitalization she remains weak although she is slowly recovering.       She  is concerned that her uric acid level increased. PCP increased allopurinol to 300 mg daily now. Ms Flower would be okay with an e-consult with rheum if new measures for gout mgmt ineffective. Generalized joint aches and brain fog this morning.     Required 3 doses metolazone this week to relieve swelling, increased RUIZ. Had not required midodrine lately. Notable reduction in RUIZ and edema since.     Improving overall with exercises from PT. Remains limited to perform ADLs due to left arm restrictions s/p pacemaker, restrictions in place at least through July 15. Unable to fully dress herself due to this restriction where she was prior to surgery.     Bystolic dose increased 5/13/25. BP more moderate since, tolerating well.         Today we discussed symptom management, goals of care, and advanced care planning.        ROS:  Review of Systems    Review of Symptoms      Symptom Assessment (ESAS 0-10 Scale)  Pain:  5  Dyspnea:  3  Anxiety:  3  Nausea:  0  Depression:  0  Anorexia:  0  Fatigue:  7  Insomnia:  0  Restlessness:  0  Agitation:  3         Pain Assessment:    Location(s): generalized    Generalized       Location: generalized        Quality: Aching        Quantity: 5/10 in intensity        Chronicity: Onset 3 month(s) ago, gradually worsening        Aggravating Factors: Movement        Alleviating Factors: None        Associated Symptoms: None    ECOG Performance Status rdGrdrrdarddrderd:rd rd3rd Living Arrangements:  Lives in assisted living and Lives alone    Psychosocial/Cultural:   See Palliative Psychosocial Note: No  **Primary  to Follow**  Palliative Care  Consult: No        Medications:  Current Medications[1]    External  database queried on 06/12/2025  by Heather CHU :     N/A    Review of patient's allergies indicates:   Allergen Reactions    Carvedilol Swelling     Lip swelling    Cephalexin Other (See Comments)     Muscle/joint weakness unable to move      Doxycycline Shortness Of Breath, Nausea And Vomiting and Other (See Comments)     weakness    Erythromycin Other (See Comments)     Complete weakness/could not walk    Gadolinium-containing contrast media Swelling     angioedema    Hydrocodone-acetaminophen Shortness Of Breath     wheezing    Labetalol Shortness Of Breath, Nausea Only, Palpitations and Other (See Comments)     weakness    Loratadine Other (See Comments)     Double vision/spots  Other reaction(s): double vision    Losartan Other (See Comments)     weakness    Loteprednol etabonate Other (See Comments), Palpitations, Anxiety and Shortness Of Breath     Patient stated bp went up and extreme muscle weakness    Naproxen      wheezing  wheezing  wheezing  Other reaction(s): wheezing    Prednisone Other (See Comments)     Myalgias and generalized weakness, unable to walk    Statins-hmg-coa reductase inhibitors Other (See Comments)     Severe Muscle weakness  Muscle weakness  Severe Muscle weakness  Other reaction(s): severe muscle weakness    Amlodipine Other (See Comments)     Myalgias    Fenofibrate Other (See Comments)     muscle weakness      Hydralazine Other (See Comments)     muscle tremors    Loteprednol Other (See Comments)     increased BP    Macrolide antibiotics Other (See Comments)     Complete weakness/could not walk      Moxifloxacin Hives and Other (See Comments)     muscle soreness    Timolol Other (See Comments)     Blurred vision, Burning eyes, Severe muscle weakness, eye problems.      Verapamil Diarrhea     Severe diarrhea.      Hydrocortisone     Isosorbide      Tolerates Imdur    Silver sulfadiaz-foam bandage     Tetanus and diphtheria toxoids     Tetanus vaccines and toxoid     Adhesive Rash     Clonidine patch--contact dermatitis    Codeine Diarrhea and Other (See Comments)     Loss of balance       Doxazosin Palpitations    Fexofenadine Other (See Comments)     Double vision/spots       Hydrocortisone (bulk) Other (See Comments)      Only suppository/ caused muscle weakness    Latanoprost Other (See Comments)     Muscle weakness      Olopatadine Other (See Comments)     Muscle weakness          OBJECTIVE:   Physical Exam:  Vitals: Temp: 97.1 °F (36.2 °C) (25 1002)  Pulse: 81 (25 1002)  BP: 94/62 (25 1002)  SpO2: 96 % (25 1002)    Physical Exam  Constitutional:       General: She is not in acute distress.     Appearance: She is ill-appearing (chronically).   HENT:      Mouth/Throat:      Mouth: Mucous membranes are moist.   Eyes:      General: No scleral icterus.  Cardiovascular:      Rate and Rhythm: Normal rate and regular rhythm.      Heart sounds: Murmur heard.   Pulmonary:      Effort: Pulmonary effort is normal.      Breath sounds: Normal breath sounds.   Abdominal:      General: There is no distension.      Tenderness: There is no abdominal tenderness.   Musculoskeletal:         General: Swelling present.      Cervical back: Neck supple.   Skin:     General: Skin is warm and dry.   Neurological:      Mental Status: She is alert. Mental status is at baseline.      Comments: forgetful   Psychiatric:         Mood and Affect: Mood normal.         Behavior: Behavior normal.         Thought Content: Thought content normal.         Judgment: Judgment normal.           Labs:  Lab Results   Component Value Date    WBC 5.39 2025    HGB 11.0 (L) 2025    HCT 33.1 (L) 2025     (H) 2025     2025           Imagin25 ECHO    Left Ventricle: The left ventricle is normal in size. Normal wall thickness. There is normal systolic function with a visually estimated ejection fraction of 60 - 65%. Grade III diastolic dysfunction.    Right Ventricle: The right ventricle is normal in size. Wall thickness is normal. Systolic function is normal.    Left Atrium: Severely dilated    Mitral Valve: There is mild regurgitation.    Tricuspid Valve: There is mild to moderate regurgitation.     Pulmonary Artery: The estimated pulmonary artery systolic pressure is 54 mmHg.    IVC/SVC: Intermediate venous pressure at 8 mmHg.     I spent a total of 47 minutes on the day of the visit. This includes face to face time in discussion of goals of care, symptom assessment, coordination of care and emotional support.  This also includes non-face to face time preparing to see the patient (eg, review of tests/imaging), obtaining and/or reviewing separately obtained history, documenting clinical information in the electronic or other health record, independently interpreting results and communicating results to the patient/family/caregiver, or care coordinator.       Heather Miller NP         [1]   Current Outpatient Medications:     acetaminophen (TYLENOL) 500 MG tablet, Take 500 mg by mouth nightly as needed., Disp: , Rfl:     allopurinoL (ZYLOPRIM) 300 MG tablet, Take 1 tablet (300 mg total) by mouth once daily., Disp: 30 tablet, Rfl: 2    ALPRAZolam (XANAX) 0.5 MG tablet, Take 1 tablet (0.5 mg total) by mouth nightly as needed for Anxiety., Disp: 4 tablet, Rfl: 0    artificial tears (ISOPTO TEARS) 0.5 % ophthalmic solution, Place 1 drop into both eyes as needed., Disp: , Rfl:     b complex vitamins capsule, Take 1 capsule by mouth once daily., Disp: , Rfl:     calcium carbonate/vitamin D3 (VITAMIN D-3 ORAL), Take 4,000 Int'l Units/day by mouth once daily., Disp: , Rfl:     colchicine (COLCRYS) 0.6 mg tablet, Take two tablets at the first sign of gout flare followed in 1 hour with a single dose of 0.6 mg, Disp: 12 tablet, Rfl: 11    ELIQUIS 2.5 mg Tab, TAKE 1 TABLET (2.5 MG TOTAL) BY MOUTH 2 (TWO) TIMES DAILY., Disp: 180 tablet, Rfl: 1    famotidine (PEPCID) 20 MG tablet, Take 1 tablet (20 mg total) by mouth every other day., Disp: , Rfl:     furosemide (LASIX) 40 MG tablet, Take 1 tab in am and 1/2 tab in evening, Disp: 90 tablet, Rfl: 1    latanoprostene bunod (VYZULTA) 0.024 % Drop, Place 1 drop into both eyes  every evening., Disp: 2.5 mL, Rfl: 12    LIDOcaine (LIDODERM) 5 %, Place 1 patch onto the skin once daily. Remove & Discard patch within 12 hours or as directed by MD, Disp: , Rfl:     metOLazone (ZAROXOLYN) 2.5 MG tablet, Take 1 tablet (2.5 mg total) by mouth daily as needed (fluid retention)., Disp: 15 tablet, Rfl: 11    midodrine (PROAMATINE) 2.5 MG Tab, Take 1 tablet (2.5 mg total) by mouth 3 (three) times daily as needed (systolic BP < 95)., Disp: 90 tablet, Rfl: 11    nebivoloL (BYSTOLIC) 10 MG Tab, Take 1 tablet (10 mg total) by mouth once daily., Disp: 30 tablet, Rfl: 2

## 2025-06-12 PROBLEM — I50.33 ACUTE ON CHRONIC DIASTOLIC HEART FAILURE: Status: ACTIVE | Noted: 2019-10-15

## 2025-06-12 NOTE — ASSESSMENT & PLAN NOTE
Continues with rapid ventricular rate at rest despite maximum intervention.     
Grade III diastolic HF.  Followed by cardiology.   Only fair sx control with increased dose of Bystolic, high doses of diuretics and vigilant HR control.   She has required significant increase in frequecy of PRN metolazone recently to achieve this.  Will ask for additional SNV to monitor sx of heart failure, medication use with anticipated further dose adjustments. Ms Flower lives independently, has impaired mobility and it is great strain for her to leave home for frequent assessments.   
Multiple complex health problems that are not fixable and resistant to tx.   Continues follow up to ensure medical tx correlates with her wishes.    
Severely impaired mobility. Now increasing uric acid despite allopurinol.   Colchicine as rescue only at renal dose.   PCP recently increased allopurinol dose. If response inadequate then consider e-consult to rheumatology.  Encouraged low purine diet.   
Uses rollator at baseline; since hospitalization mobility compromised due to weakness r/t acute illness, severe goty pain of feet and legs, LUE restrictions s/p pacemaker insertion.   Gradually improving toward baseline with assistance of home PT and OT and gout mgmt home LUE restrictions remain through July 17.   
- - -

## 2025-06-30 ENCOUNTER — OFFICE VISIT (OUTPATIENT)
Dept: PRIMARY CARE CLINIC | Facility: CLINIC | Age: OVER 89
End: 2025-06-30
Payer: MEDICARE

## 2025-06-30 VITALS
TEMPERATURE: 97 F | HEIGHT: 64 IN | WEIGHT: 176.94 LBS | HEART RATE: 105 BPM | BODY MASS INDEX: 30.21 KG/M2 | OXYGEN SATURATION: 96 % | DIASTOLIC BLOOD PRESSURE: 60 MMHG | SYSTOLIC BLOOD PRESSURE: 102 MMHG

## 2025-06-30 DIAGNOSIS — N19 HYPERTENSIVE HEART AND RENAL DISEASE WITH BOTH (CONGESTIVE) HEART FAILURE AND RENAL FAILURE: Chronic | ICD-10-CM

## 2025-06-30 DIAGNOSIS — I50.33 ACUTE ON CHRONIC DIASTOLIC HEART FAILURE: ICD-10-CM

## 2025-06-30 DIAGNOSIS — M10.379 GOUT OF FOOT DUE TO RENAL IMPAIRMENT, UNSPECIFIED CHRONICITY, UNSPECIFIED LATERALITY: ICD-10-CM

## 2025-06-30 DIAGNOSIS — I13.2 HYPERTENSIVE HEART AND RENAL DISEASE WITH BOTH (CONGESTIVE) HEART FAILURE AND RENAL FAILURE: Chronic | ICD-10-CM

## 2025-06-30 DIAGNOSIS — I12.9 CKD STAGE 4 SECONDARY TO HYPERTENSION: Primary | Chronic | ICD-10-CM

## 2025-06-30 DIAGNOSIS — L60.0 INGROWN RIGHT GREATER TOENAIL: ICD-10-CM

## 2025-06-30 DIAGNOSIS — H40.1130 CHRONIC OPEN ANGLE GLAUCOMA OF BOTH EYES, UNSPECIFIED GLAUCOMA STAGE: Chronic | ICD-10-CM

## 2025-06-30 DIAGNOSIS — N18.4 CKD STAGE 4 SECONDARY TO HYPERTENSION: Primary | Chronic | ICD-10-CM

## 2025-06-30 DIAGNOSIS — I48.19 PERSISTENT ATRIAL FIBRILLATION: Chronic | ICD-10-CM

## 2025-06-30 LAB
ALBUMIN SERPL BCP-MCNC: 3.9 G/DL (ref 3.5–5.2)
ALP SERPL-CCNC: 67 UNIT/L (ref 40–150)
ALT SERPL W/O P-5'-P-CCNC: 14 UNIT/L (ref 10–44)
ANION GAP (OHS): 13 MMOL/L (ref 8–16)
AST SERPL-CCNC: 18 UNIT/L (ref 11–45)
BILIRUB SERPL-MCNC: 0.4 MG/DL (ref 0.1–1)
BUN SERPL-MCNC: 44 MG/DL (ref 10–30)
CALCIUM SERPL-MCNC: 9.6 MG/DL (ref 8.7–10.5)
CHLORIDE SERPL-SCNC: 101 MMOL/L (ref 95–110)
CO2 SERPL-SCNC: 30 MMOL/L (ref 23–29)
CREAT SERPL-MCNC: 2 MG/DL (ref 0.5–1.4)
GFR SERPLBLD CREATININE-BSD FMLA CKD-EPI: 23 ML/MIN/1.73/M2
GLUCOSE SERPL-MCNC: 171 MG/DL (ref 70–110)
POTASSIUM SERPL-SCNC: 3.8 MMOL/L (ref 3.5–5.1)
PROT SERPL-MCNC: 7.5 GM/DL (ref 6–8.4)
SODIUM SERPL-SCNC: 144 MMOL/L (ref 136–145)
URATE SERPL-MCNC: 5.9 MG/DL (ref 2.4–5.7)

## 2025-06-30 PROCEDURE — 80053 COMPREHEN METABOLIC PANEL: CPT | Mod: HCNC

## 2025-06-30 PROCEDURE — 84550 ASSAY OF BLOOD/URIC ACID: CPT | Mod: HCNC

## 2025-06-30 PROCEDURE — 99999 PR PBB SHADOW E&M-EST. PATIENT-LVL V: CPT | Mod: PBBFAC,HCNC,,

## 2025-06-30 NOTE — PATIENT INSTRUCTIONS
If you are feeling unwell, we'd like to be the first ones to know here at [facility]! Please give us a call. Same day appointments are our top priority to keep you well and out of the emergency rooms and hospitals. Call [phone] for our direct line. After hours advice is always available. Please call [phone] after hours to speak to the on-call team.      Will call with uric acid and chemistry results taken today.     Continue gout diet, cardiac diet.

## 2025-06-30 NOTE — PROGRESS NOTES
Shirley Metz  06/30/2025  [id number]    Phylicia Deleon MD        Visit Type:a scheduled routine follow-up visit    Chief Complaint:  Chief Complaint   Patient presents with    Follow-up     Pt is here to discuss medication        History of Present Illness: Follow-up  Pertinent negatives include no abdominal pain, chest pain, chills, congestion, coughing, fever, headaches, myalgias, nausea, rash, sore throat, vomiting or weakness.     History of Present Illness    CHIEF COMPLAINT:  Shirley presents today for follow-up of multiple medical conditions including recent pacemaker placement, gout, and blood pressure management.    RECENT CARDIAC HOSPITALIZATION AND PACEMAKER PLACEMENT:  She was hospitalized in April 2025 for cardiac issues after experiencing sudden fluttering heartbeat that prompted an emergency room visit. She was diagnosed with bradycardia and atrial fibrillation, requiring ICU admission. She underwent two consecutive pacemaker surgeries, with the first procedure requiring revision due to incomplete lead attachment.    CARDIOVASCULAR MANAGEMENT:  Her home blood pressure readings are running low with slight improvement. Her heart rate remains elevated at approximately 104 BPM despite medication management, previously noted at 114 BPM. She has difficulty with medication management due to allergies to over 30 drugs. She currently takes Elsa, which significantly weakens her and impairs mobility. She had to discontinue Metoprolol as it was no longer effective. She denies acute symptoms related to heart rate or blood pressure fluctuations.    FLUID RETENTION:  She reports recent changes in ankle swelling pattern, currently experiencing mild ankle swelling upon morning awakening, which differs from her previous pattern of minimal evening swelling. She takes Lasix for fluid management and administers Metoprolol 1-2 mornings per week as needed. Lasix provides partial effectiveness in managing fluid  retention. She experiences associated shortness of breath as an indicator of fluid overload. She reports attempting to follow a low sodium diet and is transitioning to a gout diet.    GOUT:  She was initially diagnosed with gout after a week of significant symptoms including burning feet pain and knee dysfunction. Four physicians initially failed to diagnose the condition. During the acute phase, she experienced severe mobility limitations and was unable to stand or walk. She previously took Colchicine, which was discontinued due to bladder side effects. She currently takes Allopurinol, with the dose recently tripled after initial uric acid levels did not decrease. Her current gout symptoms are largely resolved with one toe remaining slightly stiff, but overall foot condition has improved. She denies active burning pain, knee dysfunction, or significant mobility restrictions. She is scheduled for podiatry evaluation for an unrelated ingrown toenail.    GLAUCOMA:  She reports conflicting diagnoses regarding glaucoma between different ophthalmologists. Dr. Cota confirms glaucoma diagnosis, while Dr. Glez suggests glaucoma is present only in one eye. She currently uses Visine drops for eye dryness and reports blocked tear ducts. She has undergone eye surgical intervention with a stent and valve, noting the valve is functional but the stent is not working. She observes improved eye dryness after discontinuing metoprolol. Intraocular pressure is currently stable according to recent checkup.         Recent Fall:  []Yes  [x]No  Activity:  []Vigorous []Moderate [x]Sedentary  Appetite:  [x]Good  []Fair  []Poor  Mood: [x]Stable []Anxious []Depressed   Insomnia: []Yes  [x]No    Hosp/ED/Urgent Care:  04/26/25 Hospital stay---Pacemaker placement     Recent appointments:   06/11/25 palliative care   05/23/25 VIVIANE rush/ Pancho     Upcoming Appointments  Future Appointments       Date Provider Specialty Appt Notes    7/2/2025  Toi Kelly MD Cardiology pt would like to discuss her restriction on her pacemaker    7/29/2025 Phylicia Deleon MD Primary Care 1 mo f/u    8/13/2025 Chelsea Hughes FNP-C Cardiology Bio/ppm - keep MB appt 12/10/25?  3mo post implant    8/13/2025  Cardiology [facility] 1130  Bio/ppm  3mo post implant    9/15/2025 Heather Miller NP Palliative Medicine Heather conde ep 3 months    12/10/2025 Toi Kelly MD Cardiology 1 yr// annual              Review of Systems   Constitutional:  Negative for chills and fever.   HENT:  Negative for congestion, ear pain, sinus pain and sore throat.    Eyes:  Negative for blurred vision and pain.   Respiratory:  Negative for cough and shortness of breath.    Cardiovascular:  Negative for chest pain and leg swelling.   Gastrointestinal:  Negative for abdominal pain, nausea and vomiting.   Genitourinary:  Negative for dysuria.   Musculoskeletal:  Negative for back pain, falls and myalgias.   Skin:  Negative for itching and rash.   Neurological:  Negative for dizziness, weakness and headaches.   Psychiatric/Behavioral:  Negative for suicidal ideas. The patient does not have insomnia.        History:  Past Medical History:   Diagnosis Date    Acute on chronic diastolic congestive heart failure 01/11/2023    Anemia 11/29/2018    Anxiety 11/28/2017    Arthritis     Atrial fibrillation with RVR 10/09/2019    Back pain     Basal cell carcinoma 03/29/2018    left mid back    Chronic diastolic congestive heart failure 10/15/2019    CKD (chronic kidney disease) stage 3, GFR 30-59 ml/min 08/08/2016    Colon polyps     reported per patient.     Coronary artery calcification 10/05/2021    Elevated liver enzymes 12/20/2019    Elevated troponin 03/15/2018    Fuchs' corneal dystrophy     General anesthetics causing adverse effect in therapeutic use     woke up during surgery and gagged on ET tube    Glaucoma     Glaucoma     Heart failure with preserved left ventricular function (HFpEF)  07/24/2018    Hyperlipidemia     Hypertension     Macular degeneration dry    Medication side effects 05/03/2017    Obesity     Pre-diabetes 12/04/2017    PVD (peripheral vascular disease) 04/26/2021    PVD (peripheral vascular disease) 04/26/2021    Squamous cell carcinoma 01/08/2019    left shoulder    Stenosis of carotid artery 10/05/2021    Troponin level elevated 01/11/2023    Trouble in sleeping     Type 2 diabetes mellitus without complication, without long-term current use of insulin 02/24/2021    Urinary tract infection      Review of patient's allergies indicates:   Allergen Reactions    Carvedilol Swelling     Lip swelling    Cephalexin Other (See Comments)     Muscle/joint weakness unable to move     Doxycycline Shortness Of Breath, Nausea And Vomiting and Other (See Comments)     weakness    Erythromycin Other (See Comments)     Complete weakness/could not walk    Gadolinium-containing contrast media Swelling     angioedema    Hydrocodone-acetaminophen Shortness Of Breath     wheezing    Labetalol Shortness Of Breath, Nausea Only, Palpitations and Other (See Comments)     weakness    Loratadine Other (See Comments)     Double vision/spots  Other reaction(s): double vision    Losartan Other (See Comments)     weakness    Loteprednol etabonate Other (See Comments), Palpitations, Anxiety and Shortness Of Breath     Patient stated bp went up and extreme muscle weakness    Naproxen      wheezing  wheezing  wheezing  Other reaction(s): wheezing    Prednisone Other (See Comments)     Myalgias and generalized weakness, unable to walk    Statins-hmg-coa reductase inhibitors Other (See Comments)     Severe Muscle weakness  Muscle weakness  Severe Muscle weakness  Other reaction(s): severe muscle weakness    Amlodipine Other (See Comments)     Myalgias    Fenofibrate Other (See Comments)     muscle weakness      Hydralazine Other (See Comments)     muscle tremors    Loteprednol Other (See Comments)     increased  BP    Macrolide antibiotics Other (See Comments)     Complete weakness/could not walk      Moxifloxacin Hives and Other (See Comments)     muscle soreness    Timolol Other (See Comments)     Blurred vision, Burning eyes, Severe muscle weakness, eye problems.      Verapamil Diarrhea     Severe diarrhea.      Hydrocortisone     Isosorbide      Tolerates Imdur    Silver sulfadiaz-foam bandage     Tetanus and diphtheria toxoids     Tetanus vaccines and toxoid     Adhesive Rash     Clonidine patch--contact dermatitis    Codeine Diarrhea and Other (See Comments)     Loss of balance       Doxazosin Palpitations    Fexofenadine Other (See Comments)     Double vision/spots       Hydrocortisone (bulk) Other (See Comments)     Only suppository/ caused muscle weakness    Latanoprost Other (See Comments)     Muscle weakness      Olopatadine Other (See Comments)     Muscle weakness           Medications:  Medications Ordered Prior to Encounter[1]      Medications have been reviewed and reconciled with patient at this visit.  Barriers to medications reviewed with patient.      Exam:  Wt Readings from Last 3 Encounters:   06/30/25 80.3 kg (176 lb 14.7 oz)   05/23/25 79.7 kg (175 lb 11.3 oz)   05/13/25 85.2 kg (187 lb 13.3 oz)     Temp Readings from Last 3 Encounters:   06/30/25 97.3 °F (36.3 °C) (Temporal)   06/11/25 97.1 °F (36.2 °C) (Temporal)   05/23/25 96.2 °F (35.7 °C) (Temporal)     BP Readings from Last 3 Encounters:   06/30/25 102/60   06/11/25 94/62   05/23/25 110/64     Pulse Readings from Last 3 Encounters:   06/30/25 105   06/11/25 81   05/23/25 (!) 111     Body mass index is 30.37 kg/m².      Physical Exam  Constitutional:       General: She is awake.      Appearance: Normal appearance. She is obese.   HENT:      Head: Normocephalic.      Right Ear: Tympanic membrane, ear canal and external ear normal.      Left Ear: Tympanic membrane, ear canal and external ear normal.      Nose: Nose normal.      Mouth/Throat:       Mouth: Mucous membranes are moist.      Pharynx: Oropharynx is clear.   Eyes:      General: Lids are normal.      Extraocular Movements: Extraocular movements intact.      Conjunctiva/sclera: Conjunctivae normal.      Pupils: Pupils are equal, round, and reactive to light.      Comments: Glasses on    Cardiovascular:      Rate and Rhythm: Normal rate. Rhythm regularly irregular.      Pulses: Normal pulses.      Heart sounds: Normal heart sounds.   Pulmonary:      Effort: Pulmonary effort is normal.      Breath sounds: Normal breath sounds.   Abdominal:      General: Bowel sounds are normal.      Palpations: Abdomen is soft.   Musculoskeletal:         General: Normal range of motion.      Cervical back: Normal range of motion and neck supple.      Right lower le+ Edema (ankle) present.      Left lower le+ Edema (ankle) present.   Skin:     General: Skin is warm and dry.      Capillary Refill: Capillary refill takes less than 2 seconds.   Neurological:      General: No focal deficit present.      Mental Status: She is alert and oriented to person, place, and time. Mental status is at baseline.      GCS: GCS eye subscore is 4. GCS verbal subscore is 5. GCS motor subscore is 6.      Motor: Weakness present.      Gait: Gait abnormal.   Psychiatric:         Attention and Perception: Attention and perception normal.         Mood and Affect: Mood and affect normal.         Speech: Speech normal.         Behavior: Behavior normal. Behavior is cooperative.         Thought Content: Thought content normal.         Cognition and Memory: Cognition normal.         Judgment: Judgment normal.         Laboratory Reviewed:     Lab Results   Component Value Date    WBC 5.39 2025    HGB 11.0 (L) 2025    HCT 33.1 (L) 2025     2025    CHOL 170 2023    TRIG 116 2023    HDL 40 2023    ALT 14 2025    AST 15 2025     2025    K 3.5 2025     2025     CREATININE 1.6 (H) 05/21/2025    BUN 41 (H) 05/21/2025    CO2 23 05/21/2025    TSH 2.946 04/27/2025    INR 1.1 10/25/2022    HGBA1C 7.7 (H) 04/28/2025       Screening or Prevention Patient's value Goal Complete/Controlled?   HgA1C Testing and Control   Lab Results   Component Value Date    HGBA1C 7.7 (H) 04/28/2025      Annually/Less than 8% Yes   Lipid profile : 08/04/2023 Annually No   LDL control Lab Results   Component Value Date    LDLCALC 106.8 08/04/2023    Annually/Less than 100 mg/dl  No   Nephropathy screening Lab Results   Component Value Date    LABMICR 99.0 10/16/2024     Lab Results   Component Value Date    PROTEINUA Negative 04/26/2025    Annually Yes   Blood pressure BP Readings from Last 1 Encounters:   06/30/25 102/60    Less than 140/90 Yes   Dilated retinal exam : 11/11/2024 Annually Yes   Foot exam   Most Recent Foot Exam Date: Not Found Annually Yes       Health Maintenance  Health Maintenance Topics with due status: Not Due       Topic Last Completion Date    Diabetes Urine Screening 10/16/2024    Diabetic Eye Exam 11/11/2024    DEXA Scan 04/14/2025    Hemoglobin A1c 04/28/2025     Health Maintenance Due   Topic Date Due    COVID-19 Vaccine (6 - 2024-25 season) 09/13/2024         1. 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  Assessment & Plan:  Lab Results   Component Value Date    EGFRNORACEVR 30 (L) 05/21/2025    EGFRNORACEVR 25 (L) 04/30/2025    EGFRNORACEVR 23 (L) 04/29/2025    CREATININE 1.6 (H) 05/21/2025    CREATININE 1.9 (H) 04/30/2025    CREATININE 2.0 (H) 04/29/2025    BUN 41 (H) 05/21/2025    BUN 58 (H) 04/30/2025    BUN 52 (H) 04/29/2025    ALBUMIN 3.7 05/21/2025    ALBUMIN 3.9 04/26/2025    ALBUMIN 4.1 03/20/2025   (    Discuss the following with patient:  Managing blood pressure   Eating foods that are good for the heart and low in sodium   Exercising regularly as tolerated  Getting enough sleep   Managing blood sugar if you have diabetes   Avoiding medications that can further  damage the kidneys such as NSAIDs (ibuprofen, naproxen)     Orders:  -     Comprehensive Metabolic Panel; Future; Expected date: 06/30/2025    2. Acute on chronic diastolic heart failure  Assessment & Plan:  Stable, chronic  Continue current treatment regimen  Minimal swelling noted  Clear lung sounds  Minimal SOB w/ exertion noted     Orders:  -     Uric Acid; Future; Expected date: 06/30/2025  -     Comprehensive Metabolic Panel; Future; Expected date: 06/30/2025    3. 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  Not able to tolerate steroids or colchicine due to bladder dysfunction     Orders:  -     Uric Acid; Future; Expected date: 06/30/2025  -     Comprehensive Metabolic Panel; Future; Expected date: 06/30/2025    4. Ingrown right greater toenail  Comments:  seeing podiatry on tomorrow to have toenail cut    5. 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 Normal wall thickness. There is normal systolic function with a visually estimated ejection fraction of 60 - 65%. Grade III diastolic dysfunction.    Right Ventricle: The right ventricle is normal in size. Wall thickness is normal. Systolic function is normal.    Left Atrium: Severely dilated    Mitral Valve: There is mild regurgitation.    Tricuspid Valve: There is mild to moderate regurgitation.    Pulmonary Artery: The estimated pulmonary artery systolic pressure is 54 mmHg.    IVC/SVC: Intermediate venous pressure at 8 mmHg.    Continue taking metolazone 2.5 mg prn along w/  lasix---improvement of CHF, SOB        6. Persistent atrial fibrillation  Assessment & Plan:  Continue Bystolic 10 mg---recommend at night   Continue to monitor Heart rate       7. Chronic open angle glaucoma of both eyes, unspecified glaucoma stage  Assessment & Plan:  Recent appointment w/ [facility]   Continue drops as prescribed               Worry Score: 3    Care Plan/Goals: Reviewed    Goals    None         Follow up: Follow up in about 1 month (around 7/30/2025) for W/ MD Pancho .      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    Health maintenance needs, recent test results and goals of care discussed with patient and questions answered. Patient was given the opportunity to ask questions and be an active participant in their medical care and questions answered.  Patient had no further questions or concerns at this time.     After visit summary printed and given to patient upon discharge.  Patient goals and care plan are included in After visit summary.    TOTAL TIME evaluating and managing this patient for this encounter was  37 minutes. This time was spent personally by me on some of the following activities: review of patient's past medical history, assessing age-appropriate health maintenance needs, review of any interval history, review and interpretation of lab results, review and interpretation of imaging test results, review and interpretation of cardiology test results, reviewing consulting specialist notes, obtaining history from  the patient and family, examination of the patient, medication reconciliation, managing and/or ordering prescription medications, ordering imaging tests, ordering referral to subspecialty provider(s), educating patient and answering their questions about diagnosis, treatment plan, and goals of treatment, discussing planned follow-up and final documentation of the visit. This time was exclusive of any separately billable procedures for this patient and exclusive of time spent treating any other patients.    This note was generated with the assistance of ambient listening technology. Verbal consent was obtained by the patient and accompanying visitor(s) for the recording of patient appointment to facilitate this note. I attest to having reviewed and edited the generated note for accuracy, though some syntax or spelling errors may persist. Please contact the author of this note for any clarification.               JAIRON Madsen, JENNIFERP-C  [facility] [address] [place] [address]   [phone] ph  [phone] fax           [1]   Current Outpatient Medications on File Prior to Visit   Medication Sig Dispense Refill    acetaminophen (TYLENOL) 500 MG tablet Take 500 mg by mouth nightly as needed.      allopurinoL (ZYLOPRIM) 300 MG tablet Take 1 tablet (300 mg total) by mouth once daily. 30 tablet 2    ALPRAZolam (XANAX) 0.5 MG tablet Take 1 tablet (0.5 mg total) by mouth nightly as needed for Anxiety. 4 tablet 0    artificial tears (ISOPTO TEARS) 0.5 % ophthalmic solution Place 1 drop into both eyes as needed.      b complex vitamins capsule Take 1 capsule by mouth once daily.      calcium carbonate/vitamin D3 (VITAMIN D-3 ORAL) Take 4,000 Int'l Units/day by mouth once daily.      colchicine (COLCRYS) 0.6 mg tablet Take two tablets at the first sign of gout flare followed in 1 hour with a single dose of 0.6 mg 12 tablet 11    ELIQUIS 2.5 mg Tab TAKE 1 TABLET (2.5 MG TOTAL) BY MOUTH 2 (TWO)  TIMES DAILY. 180 tablet 1    famotidine (PEPCID) 20 MG tablet Take 1 tablet (20 mg total) by mouth every other day.      furosemide (LASIX) 40 MG tablet Take 1 tab in am and 1/2 tab in evening 90 tablet 1    latanoprostene bunod (VYZULTA) 0.024 % Drop Place 1 drop into both eyes every evening. 2.5 mL 12    LIDOcaine (LIDODERM) 5 % Place 1 patch onto the skin once daily. Remove & Discard patch within 12 hours or as directed by MD      metOLazone (ZAROXOLYN) 2.5 MG tablet Take 1 tablet (2.5 mg total) by mouth daily as needed (fluid retention). 15 tablet 11    midodrine (PROAMATINE) 2.5 MG Tab Take 1 tablet (2.5 mg total) by mouth 3 (three) times daily as needed (systolic BP < 95). 90 tablet 11    nebivoloL (BYSTOLIC) 10 MG Tab Take 1 tablet (10 mg total) by mouth once daily. 30 tablet 2    [DISCONTINUED] cloNIDine (CATAPRES) 0.1 MG tablet Take 1 tablet (0.1 mg total) by mouth 2 (two) times daily as needed (systolic BP over 180). 20 tablet 1    [DISCONTINUED] isosorbide mononitrate (IMDUR) 30 MG 24 hr tablet Take 1 tablet (30 mg total) by mouth every evening. 90 tablet 1     No current facility-administered medications on file prior to visit.

## 2025-06-30 NOTE — ASSESSMENT & PLAN NOTE
Recommend taking Allopurinol in AM  Recheck uric acid levels today  Consider decreasing allopurinol 300 mg if uric acid levels are stable???  Not able to tolerate steroids or colchicine due to bladder dysfunction

## 2025-06-30 NOTE — ASSESSMENT & PLAN NOTE
Minimal swelling in bilateral ankles     Latest ECHO  Results for orders placed during the hospital encounter of 04/26/25    Echo    Interpretation Summary    Left Ventricle: The left ventricle is normal in size. Normal wall thickness. There is normal systolic function with a visually estimated ejection fraction of 60 - 65%. Grade III diastolic dysfunction.    Right Ventricle: The right ventricle is normal in size. Wall thickness is normal. Systolic function is normal.    Left Atrium: Severely dilated    Mitral Valve: There is mild regurgitation.    Tricuspid Valve: There is mild to moderate regurgitation.    Pulmonary Artery: The estimated pulmonary artery systolic pressure is 54 mmHg.    IVC/SVC: Intermediate venous pressure at 8 mmHg.    Continue taking metolazone 2.5 mg prn along w/ lasix---improvement of CHF, SOB     Patient reports onset of right lower rib/chest pain 40 minutes prior to ED arrival. Pain increases with inspiration. No known injury or illness. Air movement noted all lung fields upon auscultation.

## 2025-07-01 ENCOUNTER — TELEPHONE (OUTPATIENT)
Dept: CARDIOLOGY | Facility: CLINIC | Age: OVER 89
End: 2025-07-01
Payer: MEDICARE

## 2025-07-01 ENCOUNTER — RESULTS FOLLOW-UP (OUTPATIENT)
Dept: PRIMARY CARE CLINIC | Facility: CLINIC | Age: OVER 89
End: 2025-07-01

## 2025-07-01 ENCOUNTER — TELEPHONE (OUTPATIENT)
Dept: PRIMARY CARE CLINIC | Facility: CLINIC | Age: OVER 89
End: 2025-07-01
Payer: MEDICARE

## 2025-07-01 NOTE — ASSESSMENT & PLAN NOTE
Lab Results   Component Value Date    EGFRNORACEVR 30 (L) 05/21/2025    EGFRNORACEVR 25 (L) 04/30/2025    EGFRNORACEVR 23 (L) 04/29/2025    CREATININE 1.6 (H) 05/21/2025    CREATININE 1.9 (H) 04/30/2025    CREATININE 2.0 (H) 04/29/2025    BUN 41 (H) 05/21/2025    BUN 58 (H) 04/30/2025    BUN 52 (H) 04/29/2025    ALBUMIN 3.7 05/21/2025    ALBUMIN 3.9 04/26/2025    ALBUMIN 4.1 03/20/2025   (    Discuss the following with patient:  Managing blood pressure   Eating foods that are good for the heart and low in sodium   Exercising regularly as tolerated  Getting enough sleep   Managing blood sugar if you have diabetes   Avoiding medications that can further damage the kidneys such as NSAIDs (ibuprofen, naproxen)

## 2025-07-01 NOTE — PROGRESS NOTES
Subjective   Patient ID:  Shirley Metz is a 89 y.o. female who presents for follow-up of Atrial Flutter      89 yoF referred for AF management.     8/17/22: NSR on recent ECGs up until 6/15/22. AFL and AF on ECGs afterwards starting 7/11/22. Event monitor with 100% AF, controlled average V rate. Normal EF. She had AF after a new BP agent 10/19. Her symptoms are mild. She has tried amiodarone in the past which caused side effects. She has history of CAD and has QTc 450s.     9/22: AF 9/2/22 ECG. She continues to have symptomatic AF as well as symptoms on medication. She wishes to pursue ablation.     2/23: PVI/CTI 11/3/22. She had upper lip swelling after her PVI, suspected to be due to trauma from the STACEY/ET intubations. She had some complaints about the overnight care. She was in NSR on ECGs up until 1/5/23. 1/10/23 she was in atypical AFL. She takes lasix 40 mg qd and sometimes at night. Her AF episodes are few and far between based on her home BP and HR checks. She still complains of SOB. She has a lasix plan provided by the HF clinic.     3/23: Event monitor performed but not ready for assessment. She has continued HF symptoms. She is in AF today    5/23: AFL on event monitor 4/23 and on holter 5/23. Symptoms improved since her ablation with some minor swelling    11/23: Stable AFL with 2:1 conduction on 9/23 holter. Her symptoms are stable and improved since prior to her ablation.     5/8/24: AFL noted 11/23, 1/24 ECGs. Stable atypical AFL by symptoms and HR log. No syncope, near syncope.     11/24: Continues with stable asymptomatic AFL with RVR at times.     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 She has many questions about medical issues unrelated to her pacemaker or arrhythmia that she wished to discuss with me. She was changed from metoprolol to bystolic for BP  management. She has gout since her PM implant.     Event monitor 9/23:  ·  Appears to have predominant Atrial flutter, Recommend to confirm with 12 leads ECG as there is baseline artifact  ·  Heart rates varied between 69 and 146 BPM with an average of 106 BPM.  ·  There were frequent PVCs totalling 2910 and averaging 60.65 per hour.  ·  The longest RR interval was 1700 msec.  ·  The longest run of SVT was at 128 bpm for 25 beats  ·  During her reproted symptoms aflutter with controlled rates    Event monitor 4/23:  · The patient complained of 1 episodes. The symptom(s) included shortness of breath. The corresponding rhythm to the patient reported event was atrial fibrillation.  · Patient Reported Event #2 Patient activated. The patient complained of 2 episodes. The corresponding rhythm to the patient reported event was atrial fibrillation. These symptoms were associated with PVCs.  · The total Afib burden was 100%.  · The patient was asymptomatic with atrial fibrillation.    Ablation 11/22:  ·  CTI ablation.  ·  Pulmonary vein isolation.  ·  Intracardiac echo.  ·  3D mapping performed with Ensite.    Echo 7/12/22:  · Concentric remodeling and normal systolic function.  · The estimated ejection fraction is 60%.  · Normal left ventricular diastolic function.  · Normal right ventricular size with normal right ventricular systolic function.    Event monitor 7/22:  - Heart rates varied between 41 and 123 BPM with an average of 77 BPM.  - Predominant rhythm: atrial fibrillation   - Atrial Fibrillation (AF) 100.0 %  - PVC 0.48 %    My interpretation of the ECG is:    Past Medical History:  01/11/2023: Acute on chronic diastolic congestive heart failure  11/29/2018: Anemia  11/28/2017: Anxiety  No date: Arthritis  10/09/2019: Atrial fibrillation with RVR  No date: Back pain  03/29/2018: Basal cell carcinoma      Comment:  left mid back  10/15/2019: Chronic diastolic congestive heart failure  08/08/2016: CKD (chronic kidney  disease) stage 3, GFR 30-59 ml/min  No date: Colon polyps      Comment:  reported per patient.   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  No date: General anesthetics causing adverse effect in therapeutic use      Comment:  woke up during surgery and gagged on ET tube  No date: Glaucoma  No date: Glaucoma  07/24/2018: Heart failure with preserved left ventricular function   (HFpEF)  No date: Hyperlipidemia  No date: Hypertension  dry: Macular degeneration  05/03/2017: Medication side effects  No date: Obesity  12/04/2017: Pre-diabetes  04/26/2021: PVD (peripheral vascular disease)  04/26/2021: PVD (peripheral vascular disease)  01/08/2019: Squamous cell carcinoma      Comment:  left shoulder  10/05/2021: Stenosis of carotid artery  01/11/2023: Troponin level elevated  No date: Trouble in sleeping  02/24/2021: Type 2 diabetes mellitus without complication, without   long-term current use of insulin  No date: Urinary tract infection    Past Surgical History:  4/28/2025: A-V CARDIAC PACEMAKER INSERTION; N/A      Comment:  Procedure: INSERTION, CARDIAC PACEMAKER, DUAL CHAMBER;                 Surgeon: Tran Nance MD;  Location: [facility] CATH LAB;               Service: Cardiology;  Laterality: N/A;  11/03/2022: ABLATION OF ARRHYTHMOGENIC FOCUS FOR ATRIAL FIBRILLATION;   N/A      Comment:  Procedure: Ablation atrial fibrillation;  Surgeon:                Toi Kelly MD;  Location: [facility] EP LAB;  Service:               Cardiology;  Laterality: N/A;  afib, PVI/CTI, RFA, STACEY                (cx if SR), DEDE, anes, MB, 3 Prep  1938: ADENOIDECTOMY  No date: BREAST BIOPSY; Left      Comment:  1997. 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 Quintanilla  No date: EYE SURGERY      Comment:  Cataract wIOL  4/29/2025: INSERTION, ELECTRODE LEAD, CARDIAC PACEMAKER, 1 ELECTRODE   LEAD; N/A      Comment:  Procedure: INSERTION, ELECTRODE LEAD, PACEMAKER, 1                ELECTRODE LEAD;  Surgeon: Tran Nance MD;                 Location: [facility] CATH LAB;  Service: Cardiology;                 Laterality: N/A;  atrial lead revision/                replacement--Biotronik  2011: left thumb; Left      Comment:  removed cuboid for arthritis  08/21/2018: REVERSE TOTAL SHOULDER ARTHROPLASTY; Left      Comment:  Procedure: ARTHROPLASTY, SHOULDER, TOTAL, REVERSE;                 Surgeon: Solomon Lujan MD;  Location: [facility] OR;                 Service: Orthopedics;  Laterality: Left;  WITH BICEP                TENODESIS  2017: SKIN CANCER EXCISION; Left      Comment:  BCC removal by Dr. Valadez  05/11/2011: SLT- OS (aka TRABECULOPLASTY); Left      Comment:  repeat 3/14/12  08/21/2018: TENOTOMY; Left      Comment:  Procedure: TENOTOMY;  Surgeon: Solomon Lujan MD;                 Location: [facility] OR;  Service: Orthopedics;  Laterality:                Left;  1938: TONSILLECTOMY  03/21/2023: TRANSESOPHAGEAL ECHOCARDIOGRAM WITH POSSIBLE   CARDIOVERSION (STACEY W/ POSS CARDIOVERSION); N/A      Comment:  Procedure: Transesophageal echo (STACEY) intra-procedure                log documentation;  Surgeon: Trevon Ramirez MD;  Location:                [facility] CATH LAB;  Service: Cardiology;  Laterality: N/A;  10/10/2019: TREATMENT OF CARDIAC ARRHYTHMIA; N/A      Comment:  Procedure: CARDIOVERSION;  Surgeon: Pete Durán MD;                Location: [facility] CATH LAB;  Service: Cardiology;                 Laterality: N/A;  12/06/2019: TREATMENT OF CARDIAC ARRHYTHMIA; N/A      Comment:  Procedure: CARDIOVERSION;  Surgeon: Samy Castillo MD;                 Location: [facility] CATH  LAB;  Service: Cardiology;                 Laterality: N/A;    Social History    Socioeconomic History      Marital status:       Number of children: 3    Tobacco Use      Smoking status: Never      Smokeless tobacco: Never      Tobacco comments: history of passive smoking from her ex-    Substance and Sexual Activity      Alcohol use: Yes        Alcohol/week: 2.0 standard drinks of alcohol        Types: 2 Standard drinks or equivalent per week        Comment: occ      Drug use: No      Sexual activity: Not Currently        Partners: Male        Birth control/protection: Post-menopausal        Comment: discussed protection for STD's    Other Topics      Concerns:        Are you pregnant or think you may be?: No        Breast-feeding: No    Social History Narrative       x 2,  x 1. Retired artist - previously doing jewelry/wall pieces; ; lives in [facility]; has 3 sons - 52( lives in [place], 54, and 56;exercises minimally - limited due to back issues. Still drives. Does have a Living Will.    Social Drivers of Health  Financial Resource Strain: Low Risk  (4/29/2025)      Overall Financial Resource Strain (CARDIA)          Difficulty of Paying Living Expenses: Not hard at all  Food Insecurity: No Food Insecurity (4/29/2025)      Hunger Vital Sign          Worried About Running Out of Food in the Last Year: Never true          Ran Out of Food in the Last Year: Never true  Transportation Needs: No Transportation Needs (4/29/2025)      PRAPARE - Transportation          Lack of Transportation (Medical): No          Lack of Transportation (Non-Medical): No  Physical Activity: Inactive (2/21/2025)      Exercise Vital Sign          Days of Exercise per Week: 0 days          Minutes of Exercise per Session: 0 min  Stress: No Stress Concern Present (4/29/2025)      [employer] - Occupational Stress Questionnaire          Feeling of Stress : Only  a little  Housing Stability: Low Risk  (4/29/2025)      Housing Stability Vital Sign          Unable to Pay for Housing in the Last Year: No          Number of Times Moved in the Last Year: 0          Homeless in the Last Year: No    Review of patient's family history indicates:  Problem: Heart disease      Relation: Mother          Name:               Age of Onset: (Not Specified)  Problem: Hypertension      Relation: Mother          Name:               Age of Onset: (Not Specified)  Problem: Cancer      Relation: Father          Name:               Age of Onset: 88              Comment: sarcoma( ?Kaposi's ) on leg  Problem: Deep vein thrombosis      Relation: Neg Hx          Name:               Age of Onset: (Not Specified)  Problem: Ovarian cancer      Relation: Neg Hx          Name:               Age of Onset: (Not Specified)  Problem: Breast cancer      Relation: Neg Hx          Name:               Age of Onset: (Not Specified)  Problem: Kidney disease      Relation: Neg Hx          Name:               Age of Onset: (Not Specified)  Problem: Strabismus      Relation: Neg Hx          Name:               Age of Onset: (Not Specified)  Problem: Retinal detachment      Relation: Neg Hx          Name:               Age of Onset: (Not Specified)  Problem: Macular degeneration      Relation: Neg Hx          Name:               Age of Onset: (Not Specified)  Problem: Glaucoma      Relation: Neg Hx          Name:               Age of Onset: (Not Specified)  Problem: Blindness      Relation: Neg Hx          Name:               Age of Onset: (Not Specified)  Problem: Amblyopia      Relation: Neg Hx          Name:               Age of Onset: (Not Specified)  Problem: Eczema      Relation: Neg Hx          Name:               Age of Onset: (Not Specified)  Problem: Lupus      Relation: Neg Hx          Name:               Age of Onset: (Not Specified)  Problem: Melanoma      Relation: Neg Hx          Name:               Age  of Onset: (Not Specified)  Problem: Psoriasis      Relation: Neg Hx          Name:               Age of Onset: (Not Specified)  Problem: Diabetes      Relation: Neg Hx          Name:               Age of Onset: (Not Specified)  Problem: Stroke      Relation: Neg Hx          Name:               Age of Onset: (Not Specified)  Problem: Intellectual disability      Relation: Neg Hx          Name:               Age of Onset: (Not Specified)  Problem: Mental illness      Relation: Neg Hx          Name:               Age of Onset: (Not Specified)  Problem: Hyperlipidemia      Relation: Neg Hx          Name:               Age of Onset: (Not Specified)  Problem: COPD      Relation: Neg Hx          Name:               Age of Onset: (Not Specified)  Problem: Asthma      Relation: Neg Hx          Name:               Age of Onset: (Not Specified)  Problem: Depression      Relation: Neg Hx          Name:               Age of Onset: (Not Specified)  Problem: Alcohol abuse      Relation: Neg Hx          Name:               Age of Onset: (Not Specified)  Problem: Drug abuse      Relation: Neg Hx          Name:               Age of Onset: (Not Specified)    Current Outpatient Medications:  acetaminophen (TYLENOL) 500 MG tablet, Take 500 mg by mouth nightly as needed., Disp: , Rfl:   allopurinoL (ZYLOPRIM) 300 MG tablet, Take 1 tablet (300 mg total) by mouth once daily., Disp: 30 tablet, Rfl: 2  ALPRAZolam (XANAX) 0.5 MG tablet, Take 1 tablet (0.5 mg total) by mouth nightly as needed for Anxiety., Disp: 4 tablet, Rfl: 0  artificial tears (ISOPTO TEARS) 0.5 % ophthalmic solution, Place 1 drop into both eyes as needed., Disp: , Rfl:   b complex vitamins capsule, Take 1 capsule by mouth once daily., Disp: , Rfl:   calcium carbonate/vitamin D3 (VITAMIN D-3 ORAL), Take 4,000 Int'l Units/day by mouth once daily., Disp: , Rfl:   colchicine (COLCRYS) 0.6 mg tablet, Take two tablets at the first sign of gout flare followed in 1 hour with a  single dose of 0.6 mg, Disp: 12 tablet, Rfl: 11  ELIQUIS 2.5 mg Tab, TAKE 1 TABLET (2.5 MG TOTAL) BY MOUTH 2 (TWO) TIMES DAILY., Disp: 180 tablet, Rfl: 1  famotidine (PEPCID) 20 MG tablet, Take 1 tablet (20 mg total) by mouth every other day., Disp: , Rfl:   furosemide (LASIX) 40 MG tablet, Take 1 tab in am and 1/2 tab in evening, Disp: 90 tablet, Rfl: 1  latanoprostene bunod (VYZULTA) 0.024 % Drop, Place 1 drop into both eyes every evening., Disp: 2.5 mL, Rfl: 12  LIDOcaine (LIDODERM) 5 %, Place 1 patch onto the skin once daily. Remove & Discard patch within 12 hours or as directed by MD, Disp: , Rfl:   metOLazone (ZAROXOLYN) 2.5 MG tablet, Take 1 tablet (2.5 mg total) by mouth daily as needed (fluid retention)., Disp: 15 tablet, Rfl: 11  midodrine (PROAMATINE) 2.5 MG Tab, Take 1 tablet (2.5 mg total) by mouth 3 (three) times daily as needed (systolic BP < 95)., Disp: 90 tablet, Rfl: 11  nebivoloL (BYSTOLIC) 10 MG Tab, Take 1 tablet (10 mg total) by mouth once daily., Disp: 30 tablet, Rfl: 2    No current facility-administered medications for this visit.            Review of Systems   Constitutional: Positive for malaise/fatigue.   HENT:  Negative for hearing loss and hoarse voice.    Eyes:  Negative for blurred vision and visual disturbance.   Cardiovascular:  Positive for dyspnea on exertion. Negative for chest pain, claudication, irregular heartbeat, leg swelling, near-syncope, orthopnea, palpitations, paroxysmal nocturnal dyspnea and syncope.   Respiratory:  Positive for shortness of breath. Negative for cough, hemoptysis, sleep disturbances due to breathing, snoring and wheezing.    Endocrine: Negative for cold intolerance and heat intolerance.   Hematologic/Lymphatic: Does not bruise/bleed easily (on eliquis).   Skin:  Negative for color change, dry skin and nail changes.   Musculoskeletal:  Positive for arthritis and back pain. Negative for joint pain and myalgias.   Gastrointestinal:  Negative for bloating,  abdominal pain, constipation, nausea and vomiting.   Genitourinary:  Negative for dysuria, flank pain, hematuria and hesitancy.   Neurological:  Positive for weakness. Negative for headaches, light-headedness, loss of balance, numbness and paresthesias.   Psychiatric/Behavioral:  Negative for altered mental status.    Allergic/Immunologic: Negative for environmental allergies.          Objective     Physical Exam  Vitals and nursing note reviewed.   Constitutional:       General: She is not in acute distress.     Appearance: Normal appearance. She is well-developed. She is obese. She is not ill-appearing.   HENT:      Head: Normocephalic and atraumatic.      Nose: Nose normal.      Mouth/Throat:      Mouth: Mucous membranes are moist.   Eyes:      Pupils: Pupils are equal, round, and reactive to light.   Neck:      Thyroid: No thyromegaly.      Vascular: No JVD.      Trachea: No tracheal deviation.   Cardiovascular:      Rate and Rhythm: Regular rhythm. Tachycardia present.      Chest Wall: PMI is not displaced.      Pulses: Intact distal pulses.           Radial pulses are 2+ on the right side and 2+ on the left side.        Dorsalis pedis pulses are 2+ on the right side and 2+ on the left side.      Heart sounds: S1 normal and S2 normal. Heart sounds not distant. No murmur heard.  Pulmonary:      Effort: Pulmonary effort is normal. No respiratory distress.      Breath sounds: Normal breath sounds. No wheezing.   Abdominal:      General: Bowel sounds are normal. There is no distension.      Palpations: Abdomen is soft.      Tenderness: There is no abdominal tenderness.   Musculoskeletal:         General: No swelling. Normal range of motion.      Cervical back: Full passive range of motion without pain, normal range of motion and neck supple.      Right lower leg: No edema.      Left lower leg: No edema.      Right ankle: No swelling.      Left ankle: No swelling.   Skin:     General: Skin is warm and dry.       Capillary Refill: Capillary refill takes less than 2 seconds.      Nails: There is no clubbing.   Neurological:      General: No focal deficit present.      Mental Status: She is alert and oriented to person, place, and time.   Psychiatric:         Speech: Speech normal.         Behavior: Behavior normal.         Thought Content: Thought content normal.         Judgment: Judgment normal.            Assessment and Plan     1. Atrial fibrillation with RVR    2. Atrial flutter with rapid ventricular response    3. Cardiac pacemaker in situ        Plan:  89 yoF here for arrhythmia management. She had multiple questions about ailments that did not pertain to her arrhythmias or her PM. I addressed them to her satisfaction. With regard to arrhythmia care, she is in near 100% atypical AFL -270. Her PM on the late May 2025 remote check appears to be functioning normally.

## 2025-07-01 NOTE — TELEPHONE ENCOUNTER
Pt demanding blood work results from yesterday. Informed her at this time I can not release any information d/t results not reviewed by the provider yet. Informed her that I will see if provider can call her some time soon. She VU

## 2025-07-01 NOTE — TELEPHONE ENCOUNTER
Pt requested to see DR Kelly- hasn't seen him since November of 2024- wants to go over her meds - beta blocker-   Made appt to follow up with Dr Nance in August - does that need to be sooner or who should she follow up with after visit with DR Kelly?    Please advise

## 2025-07-01 NOTE — TELEPHONE ENCOUNTER
Chelsea Hughes FNP-MEGHAN Tran Staff  Caller: Unspecified (Today,  1:53 PM)  Patient had PPM implanted by Dr Nance- her appt with Dr Kelly tomorrow says it is for concerns about restrictions.    Seems like she may need visit with Dr Nance to discuss PPM issues.    Thanks  KE Pompa    Called patient to advise no answer lvm will call again later

## 2025-07-01 NOTE — TELEPHONE ENCOUNTER
Called patient about recent lab results.   Patient verbalized understanding of results. Notified patient that she has an appointment with MD Pancho within next month. Patient plans to discuss lowering allopurinol medication dose at that visit.  Advise patient to continue current dose at this time.   Patient verbalized understanding.            JAIRON Madsen, FNP-C  [facility] [address] [place] [address]   [phone]   [phone] fax

## 2025-07-01 NOTE — ASSESSMENT & PLAN NOTE
Stable, chronic  Continue current treatment regimen  Minimal swelling noted  Clear lung sounds  Minimal SOB w/ exertion noted

## 2025-07-02 ENCOUNTER — OFFICE VISIT (OUTPATIENT)
Dept: CARDIOLOGY | Facility: CLINIC | Age: OVER 89
End: 2025-07-02
Payer: MEDICARE

## 2025-07-02 VITALS
BODY MASS INDEX: 30.46 KG/M2 | SYSTOLIC BLOOD PRESSURE: 114 MMHG | HEIGHT: 64 IN | WEIGHT: 178.44 LBS | DIASTOLIC BLOOD PRESSURE: 68 MMHG | OXYGEN SATURATION: 96 % | HEART RATE: 105 BPM

## 2025-07-02 DIAGNOSIS — Z95.0 CARDIAC PACEMAKER IN SITU: ICD-10-CM

## 2025-07-02 DIAGNOSIS — I48.91 ATRIAL FIBRILLATION WITH RVR: Primary | Chronic | ICD-10-CM

## 2025-07-02 DIAGNOSIS — I48.92 ATRIAL FLUTTER WITH RAPID VENTRICULAR RESPONSE: ICD-10-CM

## 2025-07-02 PROCEDURE — 99999 PR PBB SHADOW E&M-EST. PATIENT-LVL III: CPT | Mod: PBBFAC,HCNC,, | Performed by: INTERNAL MEDICINE

## 2025-07-02 PROCEDURE — 1126F AMNT PAIN NOTED NONE PRSNT: CPT | Mod: CPTII,HCNC,S$GLB, | Performed by: INTERNAL MEDICINE

## 2025-07-02 PROCEDURE — 3288F FALL RISK ASSESSMENT DOCD: CPT | Mod: CPTII,HCNC,S$GLB, | Performed by: INTERNAL MEDICINE

## 2025-07-02 PROCEDURE — 1159F MED LIST DOCD IN RCRD: CPT | Mod: CPTII,HCNC,S$GLB, | Performed by: INTERNAL MEDICINE

## 2025-07-02 PROCEDURE — 1101F PT FALLS ASSESS-DOCD LE1/YR: CPT | Mod: CPTII,HCNC,S$GLB, | Performed by: INTERNAL MEDICINE

## 2025-07-02 PROCEDURE — 99213 OFFICE O/P EST LOW 20 MIN: CPT | Mod: HCNC,S$GLB,, | Performed by: INTERNAL MEDICINE

## 2025-07-16 DIAGNOSIS — F41.9 ANXIETY: Chronic | ICD-10-CM

## 2025-07-16 RX ORDER — ALPRAZOLAM 0.5 MG/1
0.5 TABLET ORAL NIGHTLY PRN
Qty: 30 TABLET | Refills: 1 | Status: SHIPPED | OUTPATIENT
Start: 2025-07-16

## 2025-07-21 ENCOUNTER — DOCUMENT SCAN (OUTPATIENT)
Dept: HOME HEALTH SERVICES | Facility: HOSPITAL | Age: OVER 89
End: 2025-07-21
Payer: MEDICARE

## 2025-07-23 ENCOUNTER — OFFICE VISIT (OUTPATIENT)
Dept: PALLIATIVE MEDICINE | Facility: CLINIC | Age: OVER 89
End: 2025-07-23
Payer: MEDICARE

## 2025-07-23 VITALS
SYSTOLIC BLOOD PRESSURE: 103 MMHG | TEMPERATURE: 98 F | WEIGHT: 177.94 LBS | DIASTOLIC BLOOD PRESSURE: 72 MMHG | OXYGEN SATURATION: 95 % | HEART RATE: 120 BPM | BODY MASS INDEX: 30.38 KG/M2 | RESPIRATION RATE: 17 BRPM | HEIGHT: 64 IN

## 2025-07-23 DIAGNOSIS — N19 HYPERTENSIVE HEART AND RENAL DISEASE WITH BOTH (CONGESTIVE) HEART FAILURE AND RENAL FAILURE: Chronic | ICD-10-CM

## 2025-07-23 DIAGNOSIS — I48.91 ATRIAL FIBRILLATION WITH RVR: Chronic | ICD-10-CM

## 2025-07-23 DIAGNOSIS — I48.19 PERSISTENT ATRIAL FIBRILLATION: Chronic | ICD-10-CM

## 2025-07-23 DIAGNOSIS — M10.379 GOUT OF FOOT DUE TO RENAL IMPAIRMENT, UNSPECIFIED CHRONICITY, UNSPECIFIED LATERALITY: ICD-10-CM

## 2025-07-23 DIAGNOSIS — M79.601 RIGHT ARM PAIN: Primary | ICD-10-CM

## 2025-07-23 DIAGNOSIS — I13.2 HYPERTENSIVE HEART AND RENAL DISEASE WITH BOTH (CONGESTIVE) HEART FAILURE AND RENAL FAILURE: Chronic | ICD-10-CM

## 2025-07-23 DIAGNOSIS — Z51.5 ENCOUNTER FOR PALLIATIVE CARE: ICD-10-CM

## 2025-07-23 PROCEDURE — 1159F MED LIST DOCD IN RCRD: CPT | Mod: CPTII,HCNC,S$GLB, | Performed by: NURSE PRACTITIONER

## 2025-07-23 PROCEDURE — 1101F PT FALLS ASSESS-DOCD LE1/YR: CPT | Mod: CPTII,HCNC,S$GLB, | Performed by: NURSE PRACTITIONER

## 2025-07-23 PROCEDURE — 99999 PR PBB SHADOW E&M-EST. PATIENT-LVL IV: CPT | Mod: PBBFAC,HCNC,, | Performed by: NURSE PRACTITIONER

## 2025-07-23 PROCEDURE — 3288F FALL RISK ASSESSMENT DOCD: CPT | Mod: CPTII,HCNC,S$GLB, | Performed by: NURSE PRACTITIONER

## 2025-07-23 PROCEDURE — 1160F RVW MEDS BY RX/DR IN RCRD: CPT | Mod: CPTII,HCNC,S$GLB, | Performed by: NURSE PRACTITIONER

## 2025-07-23 PROCEDURE — 1123F ACP DISCUSS/DSCN MKR DOCD: CPT | Mod: CPTII,HCNC,S$GLB, | Performed by: NURSE PRACTITIONER

## 2025-07-23 PROCEDURE — 1126F AMNT PAIN NOTED NONE PRSNT: CPT | Mod: CPTII,HCNC,S$GLB, | Performed by: NURSE PRACTITIONER

## 2025-07-23 PROCEDURE — 99215 OFFICE O/P EST HI 40 MIN: CPT | Mod: HCNC,S$GLB,, | Performed by: NURSE PRACTITIONER

## 2025-07-23 RX ORDER — LIDOCAINE 50 MG/G
1 PATCH TOPICAL DAILY
Start: 2025-07-23 | End: 2025-07-23

## 2025-07-23 RX ORDER — LIDOCAINE 50 MG/G
1 PATCH TOPICAL DAILY
Qty: 30 PATCH | Refills: 5 | Status: SHIPPED | OUTPATIENT
Start: 2025-07-23 | End: 2026-01-19

## 2025-07-23 RX ORDER — NEBIVOLOL 10 MG/1
10 TABLET ORAL DAILY
Qty: 30 TABLET | Refills: 2 | Status: SHIPPED | OUTPATIENT
Start: 2025-07-23 | End: 2025-10-21

## 2025-07-23 NOTE — PROGRESS NOTES
Palliative Medicine Clinic Note    Chief Complaint: No chief complaint on file.       ASSESSMENT/PLAN:      Plan/Recommendations:  1. Right arm pain  -     Discontinue: LIDOcaine (LIDODERM) 5 %; Place 1 patch onto the skin once daily. Remove & Discard patch within 12 hours or as directed by MD  -     Ambulatory referral/consult to Orthopedics; Future; Expected date: 07/30/2025  -     LIDOcaine (LIDODERM) 5 %; Place 1 patch onto the skin once daily. Change patch daily. Leave off 6-8 hours prior to applying next patch.  Dispense: 30 patch; Refill: 5    2. Persistent atrial fibrillation  -     nebivoloL (BYSTOLIC) 10 MG Tab; Take 1 tablet (10 mg total) by mouth once daily.  Dispense: 30 tablet; Refill: 2    3. Atrial fibrillation with RVR  Assessment & Plan:  Ventricular rate 120s-130s for the past week. Attributes to increased stress.  Rate and symptoms gradually improving. Continue Bystolic and monitor.        4. 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   Elevated BP noted since hospital discharge. Minimal swelling, no weight gain.   Continue to monitor weights, notify of increase. Low threshold to use metolazone  Continues furosemide, increased dose of nebivolol since hospital discharge.   F/U in 2 weeks, sooner if needed.       5. 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 I've encouraged her to continue in setting of elevating creatinine, intolerance of steroids and poor tolerance of colchicine.       6. Encounter for palliative care  Assessment & Plan:  Goals of care: Hopeful to remain independent, control sx.   Advanced directive: on file  HC POA: son Boris Bettencourt  Symptoms: tachycardia, RUE pain.   Follow up: 2  weeks              Advance Care Planning   Advance Directives:   Living Will: Yes        Copy on chart: Yes    Agent's Name:  Boris Bettencourt   Agent's Contact Number:  [phone]  Goals of Care: What is most important right now is to focus on avoiding the hospital, symptom/pain control, improvement in condition but with limits to invasive therapies. Accordingly, we have decided that the best plan to meet the patient's goals includes continuing with treatment.           Thank you for involving me in the care of this patient.     Follow up in about 2 weeks (around 8/6/2025).    Future Appointments   Date Time Provider Department Center   8/13/2025 10:40 AM Tran Nance MD [facility] CARDIO BR Medical C   8/13/2025 11:30 AM PACEMAKER CLINIC, [facility] ARRHYTHMIA [facility] ARR PRO BR Medical C   8/20/2025 10:40 AM Shahana Louie FNP-C [facility] [place]   9/15/2025 10:30 AM Heather Miller NP [facility] PALLCAR BR   12/10/2025  9:45 AM Toi Kelly MD [facility] ARR BR Medical C        SUBJECTIVE:      History of Present Illness / Interval History:  Shirley Metz is 89 y.o. 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 Hospitalized 4/26/25-5/2/25 for symptomatic bradycardia and subsequent pacemaker insertion. She was discharged to SNF at [facility] on 5/2/25.      Presents to Palliative Care Clinic for physical symptoms, advance care planning and additional support. Please see cardiology for more details.     7/2/25 cardiology  Assessment and Plan  1. Atrial fibrillation with RVR    2. Atrial flutter with rapid ventricular response    3. Cardiac pacemaker in situ       Plan:  89 yoF here for arrhythmia management. She had multiple questions about ailments that did not pertain to her arrhythmias or her PM. I addressed them to her satisfaction. With regard to arrhythmia care, she is  "in near 100% atypical AFL -270. Her PM on the late May 2025 remote check appears to be functioning normally.        Interval History  History obtained from: patient and medical record.    Today we discussed symptom management, goals of care, and advanced care planning.    Stressed about using new cell phone! Believes this has elevated HR. HR now in 120s consistently. Minimal increase in dyspnea, this is improving.     Hasn't been taking midodrine. Taking "double dose" (10 mg) of Bystolic. Leg weakness she attributes to Bystolic.     She would like to reduce dose of allopurinol.   RUE pain.     Lidocaine TD effective for pain control RUE. Uses sparingly. Also using Arnica. She would like to see Dr Worthington regarding shoulder pain.     Sparingly takes metolazone, twice since LOV.         ROS:  Review of Systems    Review of Symptoms      Symptom Assessment (ESAS 0-10 Scale)  Pain:  0  Dyspnea:  0  Anxiety:  0  Nausea:  0  Depression:  0  Anorexia:  0  Fatigue:  0  Insomnia:  0  Restlessness:  0  Agitation:  0         Psychosocial/Cultural:   See Palliative Psychosocial Note: No  **Primary  to Follow**  Palliative Care  Consult: No        Medications:  Current Medications[1]    External  database queried on 07/23/2025  by Heather CHU :         Review of patient's allergies indicates:   Allergen Reactions    Carvedilol Swelling     Lip swelling    Cephalexin Other (See Comments)     Muscle/joint weakness unable to move     Doxycycline Shortness Of Breath, Nausea And Vomiting and Other (See Comments)     weakness    Erythromycin Other (See Comments)     Complete weakness/could not walk    Gadolinium-containing contrast media Swelling     angioedema    Hydrocodone-acetaminophen Shortness Of Breath     wheezing    Labetalol Shortness Of Breath, Nausea Only, Palpitations and Other (See Comments)     weakness    Loratadine Other (See Comments)     Double vision/spots  Other " reaction(s): double vision    Losartan Other (See Comments)     weakness    Loteprednol etabonate Other (See Comments), Palpitations, Anxiety and Shortness Of Breath     Patient stated bp went up and extreme muscle weakness    Naproxen      wheezing  wheezing  wheezing  Other reaction(s): wheezing    Prednisone Other (See Comments)     Myalgias and generalized weakness, unable to walk    Statins-hmg-coa reductase inhibitors Other (See Comments)     Severe Muscle weakness  Muscle weakness  Severe Muscle weakness  Other reaction(s): severe muscle weakness    Amlodipine Other (See Comments)     Myalgias    Fenofibrate Other (See Comments)     muscle weakness      Hydralazine Other (See Comments)     muscle tremors    Loteprednol Other (See Comments)     increased BP    Macrolide antibiotics Other (See Comments)     Complete weakness/could not walk      Moxifloxacin Hives and Other (See Comments)     muscle soreness    Timolol Other (See Comments)     Blurred vision, Burning eyes, Severe muscle weakness, eye problems.      Verapamil Diarrhea     Severe diarrhea.      Hydrocortisone     Isosorbide      Tolerates Imdur    Silver sulfadiaz-foam bandage     Tetanus and diphtheria toxoids     Tetanus vaccines and toxoid     Adhesive Rash     Clonidine patch--contact dermatitis    Codeine Diarrhea and Other (See Comments)     Loss of balance       Doxazosin Palpitations    Fexofenadine Other (See Comments)     Double vision/spots       Hydrocortisone (bulk) Other (See Comments)     Only suppository/ caused muscle weakness    Latanoprost Other (See Comments)     Muscle weakness      Olopatadine Other (See Comments)     Muscle weakness          OBJECTIVE:   Physical Exam:  Vitals: Temp: 97.5 °F (36.4 °C) (07/23/25 1020)  Pulse: (!) 120 (07/23/25 1020)  Resp: 17 (07/23/25 1020)  BP: 103/72 (07/23/25 1020)  SpO2: 95 % (07/23/25 1020)        Physical Exam  Constitutional:       General: She is not in acute distress.  HENT:       Mouth/Throat:      Mouth: Mucous membranes are moist.   Eyes:      General: No scleral icterus.  Cardiovascular:      Rate and Rhythm: Normal rate and regular rhythm.   Pulmonary:      Effort: Pulmonary effort is normal. No respiratory distress.      Breath sounds: Normal breath sounds.   Musculoskeletal:         General: No swelling or deformity.      Right lower leg: No edema.      Left lower leg: No edema.      Comments: AROM right shoulder limited due to pain. No deformity or swelling RUE.    Skin:     General: Skin is warm and dry.   Neurological:      Mental Status: She is alert. Mental status is at baseline.      Gait: Gait (steady with rolling walker) normal.   Psychiatric:         Mood and Affect: Mood normal.         Behavior: Behavior normal.         Thought Content: Thought content normal.         Judgment: Judgment normal.           Labs:  Lab Results   Component Value Date    WBC 5.39 05/21/2025    HGB 11.0 (L) 05/21/2025    HCT 33.1 (L) 05/21/2025     (H) 05/21/2025     05/21/2025           Imaging:  None new     I spent a total of 66 minutes on the day of the visit. This includes face to face time in discussion of goals of care, symptom assessment, coordination of care and emotional support.  This also includes non-face to face time preparing to see the patient (eg, review of tests/imaging), obtaining and/or reviewing separately obtained history, documenting clinical information in the electronic or other health record, independently interpreting results and communicating results to the patient/family/caregiver, or care coordinator.         Heather Miller NP         [1]   Current Outpatient Medications:     acetaminophen (TYLENOL) 500 MG tablet, Take 500 mg by mouth nightly as needed., Disp: , Rfl:     allopurinoL (ZYLOPRIM) 300 MG tablet, Take 1 tablet (300 mg total) by mouth once daily., Disp: 30 tablet, Rfl: 2    ALPRAZolam (XANAX) 0.5 MG tablet, TAKE 1 TABLET (0.5 MG TOTAL) BY  MOUTH NIGHTLY AS NEEDED FOR ANXIETY., Disp: 30 tablet, Rfl: 1    b complex vitamins capsule, Take 1 capsule by mouth once daily., Disp: , Rfl:     calcium carbonate/vitamin D3 (VITAMIN D-3 ORAL), Take 4,000 Int'l Units/day by mouth once daily., Disp: , Rfl:     colchicine (COLCRYS) 0.6 mg tablet, Take two tablets at the first sign of gout flare followed in 1 hour with a single dose of 0.6 mg, Disp: 12 tablet, Rfl: 11    ELIQUIS 2.5 mg Tab, TAKE 1 TABLET (2.5 MG TOTAL) BY MOUTH 2 (TWO) TIMES DAILY., Disp: 180 tablet, Rfl: 1    furosemide (LASIX) 40 MG tablet, Take 1 tab in am and 1/2 tab in evening, Disp: 90 tablet, Rfl: 1    latanoprostene bunod (VYZULTA) 0.024 % Drop, Place 1 drop into both eyes every evening., Disp: 2.5 mL, Rfl: 12    LIDOcaine (LIDODERM) 5 %, Place 1 patch onto the skin once daily. Change patch daily. Leave off 6-8 hours prior to applying next patch., Disp: 30 patch, Rfl: 5    metOLazone (ZAROXOLYN) 2.5 MG tablet, Take 1 tablet (2.5 mg total) by mouth daily as needed (fluid retention)., Disp: 15 tablet, Rfl: 11    midodrine (PROAMATINE) 2.5 MG Tab, Take 1 tablet (2.5 mg total) by mouth 3 (three) times daily as needed (systolic BP < 95)., Disp: 90 tablet, Rfl: 11    nebivoloL (BYSTOLIC) 10 MG Tab, Take 1 tablet (10 mg total) by mouth once daily., Disp: 30 tablet, Rfl: 2

## 2025-07-23 NOTE — ASSESSMENT & PLAN NOTE
Lab Results   Component Value Date    URICACID 5.9 (H) 04/17/2018   Controlled with present dose of allopurinol. I've encouraged her to continue in setting of elevating creatinine, intolerance of steroids and poor tolerance of colchicine.

## 2025-07-23 NOTE — ASSESSMENT & PLAN NOTE
Ventricular rate 120s-130s for the past week. Attributes to increased stress.  Rate and symptoms gradually improving. Continue Bystolic and monitor.

## 2025-07-23 NOTE — ASSESSMENT & PLAN NOTE
Goals of care: Hopeful to remain independent, control sx.   Advanced directive: on file  HC POA: son Boris Bettencourt  Symptoms: tachycardia, RUE pain.   Follow up: 2 weeks

## 2025-07-23 NOTE — ASSESSMENT & PLAN NOTE
Followed by cardiology and nephrology.   Elevated BP noted since hospital discharge. Minimal swelling, no weight gain.   Continue to monitor weights, notify of increase. Low threshold to use metolazone  Continues furosemide, increased dose of nebivolol since hospital discharge.   F/U in 2 weeks, sooner if needed.

## 2025-07-28 ENCOUNTER — PATIENT MESSAGE (OUTPATIENT)
Dept: PALLIATIVE MEDICINE | Facility: CLINIC | Age: OVER 89
End: 2025-07-28
Payer: MEDICARE

## 2025-08-08 ENCOUNTER — LAB VISIT (OUTPATIENT)
Dept: LAB | Facility: HOSPITAL | Age: OVER 89
End: 2025-08-08
Attending: NURSE PRACTITIONER
Payer: MEDICARE

## 2025-08-08 DIAGNOSIS — I48.19 PERSISTENT ATRIAL FIBRILLATION: Chronic | ICD-10-CM

## 2025-08-08 DIAGNOSIS — R76.8 ANA POSITIVE: ICD-10-CM

## 2025-08-08 DIAGNOSIS — M10.9 GOUT, UNSPECIFIED CAUSE, UNSPECIFIED CHRONICITY, UNSPECIFIED SITE: ICD-10-CM

## 2025-08-08 DIAGNOSIS — M10.379 GOUT OF FOOT DUE TO RENAL IMPAIRMENT, UNSPECIFIED CHRONICITY, UNSPECIFIED LATERALITY: ICD-10-CM

## 2025-08-08 LAB
ALBUMIN SERPL BCP-MCNC: 4 G/DL (ref 3.5–5.2)
ALP SERPL-CCNC: 73 UNIT/L (ref 40–150)
ALT SERPL W/O P-5'-P-CCNC: 15 UNIT/L (ref 0–55)
ANION GAP (OHS): 12 MMOL/L (ref 8–16)
AST SERPL-CCNC: 27 UNIT/L (ref 0–50)
BILIRUB SERPL-MCNC: 0.4 MG/DL (ref 0.1–1)
BUN SERPL-MCNC: 56 MG/DL (ref 10–30)
CALCIUM SERPL-MCNC: 9.7 MG/DL (ref 8.7–10.5)
CHLORIDE SERPL-SCNC: 97 MMOL/L (ref 95–110)
CO2 SERPL-SCNC: 31 MMOL/L (ref 23–29)
CREAT SERPL-MCNC: 2.3 MG/DL (ref 0.5–1.4)
GFR SERPLBLD CREATININE-BSD FMLA CKD-EPI: 19 ML/MIN/1.73/M2
GLUCOSE SERPL-MCNC: 235 MG/DL (ref 70–110)
NT-PROBNP SERPL-MCNC: 1422 PG/ML
POTASSIUM SERPL-SCNC: 3.6 MMOL/L (ref 3.5–5.1)
PROT SERPL-MCNC: 7.8 GM/DL (ref 6–8.4)
SODIUM SERPL-SCNC: 140 MMOL/L (ref 136–145)
URATE SERPL-MCNC: 5.2 MG/DL (ref 2.4–5.7)

## 2025-08-08 PROCEDURE — 80053 COMPREHEN METABOLIC PANEL: CPT | Mod: HCNC

## 2025-08-08 PROCEDURE — 36415 COLL VENOUS BLD VENIPUNCTURE: CPT | Mod: HCNC

## 2025-08-08 PROCEDURE — 83880 ASSAY OF NATRIURETIC PEPTIDE: CPT | Mod: HCNC

## 2025-08-08 PROCEDURE — 84550 ASSAY OF BLOOD/URIC ACID: CPT | Mod: HCNC

## 2025-08-10 RX ORDER — ALLOPURINOL 300 MG/1
300 TABLET ORAL DAILY
Qty: 90 TABLET | Refills: 0 | Status: SHIPPED | OUTPATIENT
Start: 2025-08-10 | End: 2025-11-08

## 2025-08-11 ENCOUNTER — PATIENT MESSAGE (OUTPATIENT)
Dept: PALLIATIVE MEDICINE | Facility: CLINIC | Age: OVER 89
End: 2025-08-11
Payer: MEDICARE

## 2025-08-13 ENCOUNTER — OFFICE VISIT (OUTPATIENT)
Dept: CARDIOLOGY | Facility: CLINIC | Age: OVER 89
End: 2025-08-13
Payer: MEDICARE

## 2025-08-13 ENCOUNTER — HOSPITAL ENCOUNTER (OUTPATIENT)
Dept: CARDIOLOGY | Facility: HOSPITAL | Age: OVER 89
Discharge: HOME | End: 2025-08-13
Attending: INTERNAL MEDICINE
Payer: MEDICARE

## 2025-08-13 VITALS
HEIGHT: 64 IN | WEIGHT: 178.25 LBS | BODY MASS INDEX: 30.43 KG/M2 | SYSTOLIC BLOOD PRESSURE: 108 MMHG | HEART RATE: 123 BPM | OXYGEN SATURATION: 96 % | DIASTOLIC BLOOD PRESSURE: 72 MMHG

## 2025-08-13 DIAGNOSIS — I49.5 SICK SINUS SYNDROME: ICD-10-CM

## 2025-08-13 DIAGNOSIS — E11.22 TYPE 2 DIABETES MELLITUS WITH STAGE 4 CHRONIC KIDNEY DISEASE, WITHOUT LONG-TERM CURRENT USE OF INSULIN: ICD-10-CM

## 2025-08-13 DIAGNOSIS — I48.91 ATRIAL FIBRILLATION WITH RVR: Primary | ICD-10-CM

## 2025-08-13 DIAGNOSIS — N18.4 CKD STAGE 4 SECONDARY TO HYPERTENSION: ICD-10-CM

## 2025-08-13 DIAGNOSIS — I48.92 ATRIAL FLUTTER WITH RAPID VENTRICULAR RESPONSE: ICD-10-CM

## 2025-08-13 DIAGNOSIS — Z79.01 CHRONIC ANTICOAGULATION: ICD-10-CM

## 2025-08-13 DIAGNOSIS — I12.9 CKD STAGE 4 SECONDARY TO HYPERTENSION: ICD-10-CM

## 2025-08-13 DIAGNOSIS — R00.1 SYMPTOMATIC SINUS BRADYCARDIA: ICD-10-CM

## 2025-08-13 DIAGNOSIS — Z95.0 CARDIAC PACEMAKER IN SITU: ICD-10-CM

## 2025-08-13 DIAGNOSIS — Z51.5 PALLIATIVE CARE STATUS: ICD-10-CM

## 2025-08-13 DIAGNOSIS — N18.4 TYPE 2 DIABETES MELLITUS WITH STAGE 4 CHRONIC KIDNEY DISEASE, WITHOUT LONG-TERM CURRENT USE OF INSULIN: ICD-10-CM

## 2025-08-13 LAB — OHS CV CPX PATIENT HEIGHT IN: 64

## 2025-08-13 PROCEDURE — 1126F AMNT PAIN NOTED NONE PRSNT: CPT | Mod: CPTII,HCNC,S$GLB, | Performed by: INTERNAL MEDICINE

## 2025-08-13 PROCEDURE — 1159F MED LIST DOCD IN RCRD: CPT | Mod: CPTII,HCNC,S$GLB, | Performed by: INTERNAL MEDICINE

## 2025-08-13 PROCEDURE — 3288F FALL RISK ASSESSMENT DOCD: CPT | Mod: CPTII,HCNC,S$GLB, | Performed by: INTERNAL MEDICINE

## 2025-08-13 PROCEDURE — 1101F PT FALLS ASSESS-DOCD LE1/YR: CPT | Mod: CPTII,HCNC,S$GLB, | Performed by: INTERNAL MEDICINE

## 2025-08-13 PROCEDURE — 93280 PM DEVICE PROGR EVAL DUAL: CPT | Mod: HCNC

## 2025-08-13 PROCEDURE — 99999 PR PBB SHADOW E&M-EST. PATIENT-LVL IV: CPT | Mod: PBBFAC,HCNC,, | Performed by: INTERNAL MEDICINE

## 2025-08-13 PROCEDURE — 99214 OFFICE O/P EST MOD 30 MIN: CPT | Mod: HCNC,S$GLB,, | Performed by: INTERNAL MEDICINE

## 2025-08-13 RX ORDER — AMIODARONE HYDROCHLORIDE 200 MG/1
200 TABLET ORAL DAILY
Qty: 90 TABLET | Refills: 3 | Status: SHIPPED | OUTPATIENT
Start: 2025-08-13 | End: 2026-08-13

## 2025-08-22 RX ORDER — APIXABAN 2.5 MG/1
2.5 TABLET, FILM COATED ORAL 2 TIMES DAILY
Qty: 180 TABLET | Refills: 0 | Status: SHIPPED | OUTPATIENT
Start: 2025-08-22

## 2025-08-27 ENCOUNTER — OFFICE VISIT (OUTPATIENT)
Dept: SPORTS MEDICINE | Facility: CLINIC | Age: OVER 89
End: 2025-08-27
Payer: MEDICARE

## 2025-08-27 DIAGNOSIS — M19.011 GLENOHUMERAL ARTHRITIS, RIGHT: Primary | ICD-10-CM

## 2025-08-27 PROCEDURE — 1160F RVW MEDS BY RX/DR IN RCRD: CPT | Mod: CPTII,S$GLB,, | Performed by: STUDENT IN AN ORGANIZED HEALTH CARE EDUCATION/TRAINING PROGRAM

## 2025-08-27 PROCEDURE — 99999 PR PBB SHADOW E&M-EST. PATIENT-LVL III: CPT | Mod: PBBFAC,HCNC,, | Performed by: STUDENT IN AN ORGANIZED HEALTH CARE EDUCATION/TRAINING PROGRAM

## 2025-08-27 PROCEDURE — 99214 OFFICE O/P EST MOD 30 MIN: CPT | Mod: S$GLB,,, | Performed by: STUDENT IN AN ORGANIZED HEALTH CARE EDUCATION/TRAINING PROGRAM

## 2025-08-27 PROCEDURE — 1159F MED LIST DOCD IN RCRD: CPT | Mod: CPTII,S$GLB,, | Performed by: STUDENT IN AN ORGANIZED HEALTH CARE EDUCATION/TRAINING PROGRAM

## 2025-08-28 ENCOUNTER — CLINICAL SUPPORT (OUTPATIENT)
Dept: CARDIOLOGY | Facility: HOSPITAL | Age: OVER 89
End: 2025-08-28
Attending: INTERNAL MEDICINE
Payer: MEDICARE

## 2025-08-28 ENCOUNTER — CLINICAL SUPPORT (OUTPATIENT)
Dept: CARDIOLOGY | Facility: HOSPITAL | Age: OVER 89
End: 2025-08-28

## 2025-08-28 DIAGNOSIS — I49.5 SICK SINUS SYNDROME: ICD-10-CM

## 2025-08-28 DIAGNOSIS — I48.92 UNSPECIFIED ATRIAL FLUTTER: ICD-10-CM

## 2025-08-28 DIAGNOSIS — Z95.0 PRESENCE OF CARDIAC PACEMAKER: ICD-10-CM

## 2025-08-28 DIAGNOSIS — R00.2 PALPITATIONS: Primary | ICD-10-CM

## 2025-08-28 PROCEDURE — 93296 REM INTERROG EVL PM/IDS: CPT | Performed by: INTERNAL MEDICINE

## 2025-08-28 PROCEDURE — 93294 REM INTERROG EVL PM/LDLS PM: CPT | Mod: S$GLB,,, | Performed by: INTERNAL MEDICINE

## 2025-08-29 ENCOUNTER — OFFICE VISIT (OUTPATIENT)
Dept: CARDIOLOGY | Facility: CLINIC | Age: OVER 89
End: 2025-08-29
Payer: MEDICARE

## 2025-08-29 ENCOUNTER — TELEPHONE (OUTPATIENT)
Dept: CARDIOLOGY | Facility: CLINIC | Age: OVER 89
End: 2025-08-29

## 2025-08-29 ENCOUNTER — HOSPITAL ENCOUNTER (OUTPATIENT)
Dept: CARDIOLOGY | Facility: HOSPITAL | Age: OVER 89
Discharge: HOME | End: 2025-08-29
Payer: MEDICARE

## 2025-08-29 VITALS
WEIGHT: 192.44 LBS | DIASTOLIC BLOOD PRESSURE: 67 MMHG | SYSTOLIC BLOOD PRESSURE: 110 MMHG | BODY MASS INDEX: 32.85 KG/M2 | OXYGEN SATURATION: 96 % | HEIGHT: 64 IN | HEART RATE: 126 BPM

## 2025-08-29 DIAGNOSIS — I13.2 HYPERTENSIVE HEART AND RENAL DISEASE WITH BOTH (CONGESTIVE) HEART FAILURE AND RENAL FAILURE: Chronic | ICD-10-CM

## 2025-08-29 DIAGNOSIS — N19 HYPERTENSIVE HEART AND RENAL DISEASE WITH BOTH (CONGESTIVE) HEART FAILURE AND RENAL FAILURE: Chronic | ICD-10-CM

## 2025-08-29 DIAGNOSIS — I11.9 HYPERTENSIVE LEFT VENTRICULAR HYPERTROPHY, WITHOUT HEART FAILURE: Chronic | ICD-10-CM

## 2025-08-29 DIAGNOSIS — R94.31 ABNORMAL ECG: ICD-10-CM

## 2025-08-29 DIAGNOSIS — I49.5 TACHY-BRADY SYNDROME: ICD-10-CM

## 2025-08-29 DIAGNOSIS — I50.33 ACUTE ON CHRONIC DIASTOLIC HEART FAILURE: ICD-10-CM

## 2025-08-29 DIAGNOSIS — Z79.01 CHRONIC ANTICOAGULATION: ICD-10-CM

## 2025-08-29 DIAGNOSIS — Z95.0 CARDIAC PACEMAKER IN SITU: ICD-10-CM

## 2025-08-29 DIAGNOSIS — Z78.9 STATIN INTOLERANCE: Chronic | ICD-10-CM

## 2025-08-29 DIAGNOSIS — I12.9 CKD STAGE 4 SECONDARY TO HYPERTENSION: Chronic | ICD-10-CM

## 2025-08-29 DIAGNOSIS — I50.32 CHRONIC DIASTOLIC CONGESTIVE HEART FAILURE: ICD-10-CM

## 2025-08-29 DIAGNOSIS — I48.92 ATRIAL FLUTTER WITH RAPID VENTRICULAR RESPONSE: Primary | ICD-10-CM

## 2025-08-29 DIAGNOSIS — I49.5 SICK SINUS SYNDROME: ICD-10-CM

## 2025-08-29 DIAGNOSIS — I10 PRIMARY HYPERTENSION: Chronic | ICD-10-CM

## 2025-08-29 DIAGNOSIS — N18.4 CKD STAGE 4 SECONDARY TO HYPERTENSION: Chronic | ICD-10-CM

## 2025-08-29 LAB
OHS CV AF BURDEN PERCENT: 88
OHS CV DC REMOTE DEVICE TYPE: NORMAL
OHS CV ICD SHOCK: NO
OHS CV RV PACING PERCENT: 10 %

## 2025-08-29 PROCEDURE — 99999 PR PBB SHADOW E&M-EST. PATIENT-LVL IV: CPT | Mod: PBBFAC,,, | Performed by: PHYSICIAN ASSISTANT

## 2025-09-05 ENCOUNTER — OFFICE VISIT (OUTPATIENT)
Dept: CARDIOLOGY | Facility: CLINIC | Age: OVER 89
End: 2025-09-05
Payer: MEDICARE

## 2025-09-05 VITALS
SYSTOLIC BLOOD PRESSURE: 106 MMHG | WEIGHT: 178 LBS | HEART RATE: 124 BPM | DIASTOLIC BLOOD PRESSURE: 72 MMHG | BODY MASS INDEX: 30.56 KG/M2

## 2025-09-05 DIAGNOSIS — I48.92 ATRIAL FLUTTER WITH RAPID VENTRICULAR RESPONSE: Primary | ICD-10-CM

## 2025-09-05 DIAGNOSIS — I49.5 SICK SINUS SYNDROME: ICD-10-CM

## 2025-09-05 DIAGNOSIS — Z95.0 CARDIAC PACEMAKER IN SITU: ICD-10-CM

## 2025-09-05 DIAGNOSIS — N19 HYPERTENSIVE HEART AND RENAL DISEASE WITH BOTH (CONGESTIVE) HEART FAILURE AND RENAL FAILURE: Chronic | ICD-10-CM

## 2025-09-05 DIAGNOSIS — Z79.01 CHRONIC ANTICOAGULATION: ICD-10-CM

## 2025-09-05 DIAGNOSIS — I13.2 HYPERTENSIVE HEART AND RENAL DISEASE WITH BOTH (CONGESTIVE) HEART FAILURE AND RENAL FAILURE: Chronic | ICD-10-CM

## 2025-09-05 PROCEDURE — 99999 PR PBB SHADOW E&M-EST. PATIENT-LVL III: CPT | Mod: PBBFAC,HCNC,, | Performed by: NURSE PRACTITIONER

## 2025-09-05 RX ORDER — FUROSEMIDE 40 MG/1
TABLET ORAL
Qty: 90 TABLET | Refills: 1 | Status: SHIPPED | OUTPATIENT
Start: 2025-09-05

## (undated) DEVICE — GAUZE SPONGE 4X4 12PLY

## (undated) DEVICE — POWDER ARISTA AH 1GM

## (undated) DEVICE — PACK PACER PERMANENT OMC

## (undated) DEVICE — NDL PERCUTANEOUS 21G 7CM VASC

## (undated) DEVICE — COVER OVERHEAD SURG LT BLUE

## (undated) DEVICE — SEE MEDLINE ITEM 152530

## (undated) DEVICE — INTRO AGILIS MED CRL 8.5F 71CM

## (undated) DEVICE — PAD DEFIB CADENCE ADULT R2

## (undated) DEVICE — SUT VICRYL PLUS 0 CT1 36IN

## (undated) DEVICE — SCALPEL SZ 10 RETRACTABLE

## (undated) DEVICE — SUPPORT ULNA NERVE PROTECTOR

## (undated) DEVICE — PAD GROUNDING DISPER ELECTRODE

## (undated) DEVICE — DRESSING TRANS 4X4 TEGADERM

## (undated) DEVICE — SUT VICRYL 0 27 CT-2

## (undated) DEVICE — DRAPE STERI INSTRUMENT 1018

## (undated) DEVICE — KIT INTRODUCER STIFFEN MICRO

## (undated) DEVICE — SLING ARM ULTRA III PAD MED

## (undated) DEVICE — SPONGE LAP 18X18 PREWASHED

## (undated) DEVICE — DRAPE STERI U-SHAPED 47X51IN

## (undated) DEVICE — R CATH BIDIRECTIONL DF CRV 7FR

## (undated) DEVICE — CORD BIPOLAR ELECTROSURGICAL

## (undated) DEVICE — KIT PROBE COVER WITH GEL

## (undated) DEVICE — SHEATH HEMOSTASIS 8.5FR

## (undated) DEVICE — 2.5MM DRILL BIT

## (undated) DEVICE — SUT FIBERWIRE 2 CLLGN COAT

## (undated) DEVICE — GLOVE PROTEXIS HYDROGEL SZ7.5

## (undated) DEVICE — DRAPE PLASTIC U 60X72

## (undated) DEVICE — CATH TACTICATH ABLAT BIDIR F-J

## (undated) DEVICE — SEE MEDLINE ITEM 157027

## (undated) DEVICE — R CATH ACUSON ACUNAV 8FR

## (undated) DEVICE — TUBING ARTHRO IRR 4-LEAD

## (undated) DEVICE — SEE MEDLINE ITEM 157125

## (undated) DEVICE — STOCKINET TUBULAR 1 PLY 6X60IN

## (undated) DEVICE — BLADE PLASMA WIDE SPATULA TIP

## (undated) DEVICE — SEE MEDLINE ITEM 152622

## (undated) DEVICE — KIT IRR SUCTION HND PIECE

## (undated) DEVICE — SUT 3-0 VICRYL / SH (J416)

## (undated) DEVICE — SEE MEDLINE ITEM 157117

## (undated) DEVICE — SUCTION FRAZIER TIP SURG 12FR

## (undated) DEVICE — ADHESIVE DERMABOND ADVANCED

## (undated) DEVICE — COVER DRAPE ACUSON STERILE

## (undated) DEVICE — CLOSURE SKIN STERI STRIP 1/2X4

## (undated) DEVICE — SOL IRR NACL .9% 3000ML

## (undated) DEVICE — GLOVE 8 PROTEXIS PI BLUE

## (undated) DEVICE — DRAPE INCISE IOBAN 2 23X17IN

## (undated) DEVICE — SYR BULB 60CC IRRIGATION

## (undated) DEVICE — SYR 10CC LUER LOCK

## (undated) DEVICE — SEE MEDLINE ITEM 157216

## (undated) DEVICE — BOWL FLUID - BACK STOP

## (undated) DEVICE — SEALER AQUAMANTYS 3.48MM

## (undated) DEVICE — PAD RADIOLUCENT STAT ADULT

## (undated) DEVICE — DRESSING XEROFORM 1X8IN

## (undated) DEVICE — MANIFOLD 4 PORT

## (undated) DEVICE — SET TUBING COOL POINT IRR

## (undated) DEVICE — DRESSING XEROFORM GAUZE 5X9

## (undated) DEVICE — STAPLER SKIN REGULAR

## (undated) DEVICE — SHEATH SAFESHEATH II ULTRA 6FR

## (undated) DEVICE — DRESSING AQUACEL AG ADV 3.5X6

## (undated) DEVICE — ELECTRODE REM PLYHSV RETURN 9

## (undated) DEVICE — INTRODUCER HEMOSTASIS 7.5F

## (undated) DEVICE — SEE MEDLINE ITEM 157166

## (undated) DEVICE — SUT VICRYL BR 1 GEN 27 CT-1

## (undated) DEVICE — STAPLER SKIN PROXIMATE WIDE

## (undated) DEVICE — PIN GUIDE DELTA 2.5X120MM
Type: IMPLANTABLE DEVICE | Site: SHOULDER | Status: NON-FUNCTIONAL
Removed: 2018-08-21

## (undated) DEVICE — SUT VICRYL 2-0 36 CT-1

## (undated) DEVICE — Device

## (undated) DEVICE — KIT PIN STEINMANN PFC .025X5IN
Type: IMPLANTABLE DEVICE | Site: SHOULDER | Status: NON-FUNCTIONAL
Removed: 2018-08-21

## (undated) DEVICE — SEE MEDLINE ITEM 157131

## (undated) DEVICE — KIT ENSITE ELECTRODE SURFACE

## (undated) DEVICE — NDL TRNSSPTL BRK-1 18GA 98CM

## (undated) DEVICE — ADHESIVE MASTISOL VIAL 48/BX

## (undated) DEVICE — HOOD T-5 TEAR AWAY STERILE

## (undated) DEVICE — COVER TABLE 44X90 STERILE

## (undated) DEVICE — DRAPE INCISE IOBAN 2 23X33IN

## (undated) DEVICE — GLOVE 7.5 PROTEXIS PI MICRO

## (undated) DEVICE — SOL WATER STRL IRR 1000ML

## (undated) DEVICE — SOL NACL 0.9% INJ PF/50151

## (undated) DEVICE — DRESSING SURGICAL 1/2X1/2

## (undated) DEVICE — CATH MAP BI-D HD SENSOR ENABLE

## (undated) DEVICE — BLADE SAW SAGITTAL18X1.19X90MM

## (undated) DEVICE — PACK EP DRAPE OMC

## (undated) DEVICE — SHEET XLGE DRAPE

## (undated) DEVICE — SEE MEDLINE ITEM 146292

## (undated) DEVICE — TIP SUCTION YANKAUER

## (undated) DEVICE — APPLICATOR CHLORAPREP ORN 26ML

## (undated) DEVICE — GLOVE 7.5 PROTEXIS PI BLUE

## (undated) DEVICE — STOPCOCK 3-WAY

## (undated) DEVICE — TAPE SURGICAL MICROFOAM 3IN

## (undated) DEVICE — CAUTERY BOVIE PENCIL

## (undated) DEVICE — PAD ABD 8X10 STERILE